# Patient Record
Sex: FEMALE | Race: WHITE | NOT HISPANIC OR LATINO | Employment: PART TIME | URBAN - METROPOLITAN AREA
[De-identification: names, ages, dates, MRNs, and addresses within clinical notes are randomized per-mention and may not be internally consistent; named-entity substitution may affect disease eponyms.]

---

## 2017-01-11 ENCOUNTER — TELEPHONE (OUTPATIENT)
Dept: ENDOCRINOLOGY | Facility: CLINIC | Age: 62
End: 2017-01-11

## 2017-01-11 DIAGNOSIS — E66.01 MORBID OBESITY DUE TO EXCESS CALORIES (H): Primary | ICD-10-CM

## 2017-01-11 NOTE — TELEPHONE ENCOUNTER
Rahat is calling on Elizabeth's behalf for Dr. Irene.  Her insurance co has a limit of 3 on the number of topiramate she can have per day.  She is currently on 6 per day.  Could we do PA for larger quantity? [employer] [place]. [phone] her ID # [id number]    Rahat [phone] OK to leave a message.    Will route to Dr. Maria Victoria Orta's clinic nurse.

## 2017-01-12 RX ORDER — TOPIRAMATE 100 MG/1
TABLET, FILM COATED ORAL
Qty: 90 TABLET | Refills: 1 | Status: SHIPPED | OUTPATIENT
Start: 2017-01-12 | End: 2017-02-17

## 2017-01-12 NOTE — TELEPHONE ENCOUNTER
"topiramate (TOPAMAX) 50 MG tablet 120 tablet 5 11/14/2016  No      Sig: Take 2 tablets (100 mg) in the morning and 4 tablets (200 mg) in the evening     Per OV 11/14/16:  \"Increase topiramate to 100 in AM and 200 in evening.\"      Rx changed to 100 mg tabs per [facility] nurse protocol.  Patient's , Rahat Verbalized understanding and agrees.  Marivel Patiño RN, BSN   "

## 2017-01-13 ENCOUNTER — ALLIED HEALTH/NURSE VISIT (OUTPATIENT)
Dept: SURGERY | Facility: CLINIC | Age: 62
End: 2017-01-13

## 2017-01-13 DIAGNOSIS — Z48.298 AFTERCARE FOLLOWING ORGAN TRANSPLANT: ICD-10-CM

## 2017-01-13 DIAGNOSIS — Z79.899 ENCOUNTER FOR LONG-TERM CURRENT USE OF MEDICATION: ICD-10-CM

## 2017-01-13 DIAGNOSIS — Z94.0 KIDNEY REPLACED BY TRANSPLANT: ICD-10-CM

## 2017-01-13 LAB
ANION GAP SERPL CALCULATED.3IONS-SCNC: 8 MMOL/L (ref 3–14)
BUN SERPL-MCNC: 14 MG/DL (ref 7–30)
CALCIUM SERPL-MCNC: 9.2 MG/DL (ref 8.5–10.1)
CHLORIDE SERPL-SCNC: 108 MMOL/L (ref 94–109)
CO2 SERPL-SCNC: 24 MMOL/L (ref 20–32)
CREAT SERPL-MCNC: 0.94 MG/DL (ref 0.52–1.04)
CYCLOSPORINE BLD LC/MS/MS-MCNC: 93 UG/L (ref 50–400)
ERYTHROCYTE [DISTWIDTH] IN BLOOD BY AUTOMATED COUNT: 14.5 % (ref 10–15)
GFR SERPL CREATININE-BSD FRML MDRD: 61 ML/MIN/1.7M2
GLUCOSE SERPL-MCNC: 138 MG/DL (ref 70–99)
HCT VFR BLD AUTO: 41.5 % (ref 35–47)
HGB BLD-MCNC: 13.3 G/DL (ref 11.7–15.7)
MCH RBC QN AUTO: 27.3 PG (ref 26.5–33)
MCHC RBC AUTO-ENTMCNC: 32 G/DL (ref 31.5–36.5)
MCV RBC AUTO: 85 FL (ref 78–100)
PLATELET # BLD AUTO: 193 10E9/L (ref 150–450)
POTASSIUM SERPL-SCNC: 4.2 MMOL/L (ref 3.4–5.3)
RBC # BLD AUTO: 4.87 10E12/L (ref 3.8–5.2)
SODIUM SERPL-SCNC: 140 MMOL/L (ref 133–144)
TME LAST DOSE: NORMAL H
WBC # BLD AUTO: 3.9 10E9/L (ref 4–11)

## 2017-01-13 NOTE — PATIENT INSTRUCTIONS
Goals:    1. Eat lean protein at each meal (3 times/day) along with a non-starchy vegetable or whole fruit, up to 1/2 c carb at a meal.   -Have your 20 g protein bar for breakfast.     -Try having 2-3 slices of turkey meat for lunch in a tommy lettuce leaf.  2. Limit peanut butter to 1 Tbsp (during day), using hard boiled eggs, string cheese, fish, softer/slow-cooked meats/poultry, and light Greek yogurt as protein sources instead.  3. If needing a snack, have vegetables.  4. Restart exercise routine (at least 1 time/week walking on the treadmill 15 min + additional 30 min walking).

## 2017-01-13 NOTE — MR AVS SNAPSHOT
MRN:[id number]                      After Visit Summary   1/13/2017    Elizabeth Palma    MRN: [id number]           Visit Information        Provider Department      1/13/2017 9:00 AM Francesca Danielle RD Surgical Weight Management        Your next 10 appointments already scheduled     Jan 20, 2017  2:00 PM   Return Visit with Javier Tuttle DPM   [facility] ([facility])    [address] [place] [id number] [phone]            Feb 17, 2017  9:15 AM   (Arrive by 9:00 AM)   Return Visit with Prakash Irene MD   Medical Weight Management ([facility])    [address] [place] [address]  4th [facility] [id number] [phone]            Feb 24, 2017  8:30 AM   LAB with  LAB   [facility] ([facility])    [address] [place] [id number] [phone]           Patient must bring picture ID.  Patient should be prepared to give a urine specimen  Please do not eat 10-12 hours before your appointment if you are coming in fasting for labs on lipids, cholesterol, or glucose (sugar).  Pregnant women should follow their Care Team instructions. Water with medications is okay. Do not drink coffee or other fluids.   If you have concerns about taking  your medications, please ask at office or if scheduling via [employer], send a message by clicking on Secure Messaging, Message Your Care Team.            Feb 24, 2017  9:00 AM   NUTRITION VISIT with Francesca Danielle RD   Surgical Weight Management ([facility])    [address] [place] [id number] [phone]            Mar 20, 2017  4:30 PM   (Arrive by 4:00 PM)   Return Kidney Transplant with Christian Jimenez MD   [facility] Nephrology ([facility])    [address] [place] [id number] [phone]             Mar 24, 2017  9:00 AM   LAB with  LAB   [facility] ([facility])    [address] [place] [address]  1st [facility] [id number] [phone]           Patient must bring picture ID.  Patient should be prepared to give a urine specimen  Please do not eat 10-12 hours before your appointment if you are coming in fasting for labs on lipids, cholesterol, or glucose (sugar).  Pregnant women should follow their Care Team instructions. Water with medications is okay. Do not drink coffee or other fluids.   If you have concerns about taking  your medications, please ask at office or if scheduling via [employer], send a message by clicking on Secure Messaging, Message Your Care Team.            Mar 24, 2017  9:30 AM   NUTRITION VISIT with Francesca Danielle RD   Surgical Weight Management ([facility])    [address] [place] [address]  4th [facility] [id number] [phone]            Apr 18, 2017 10:15 AM   RETURN GENERAL with Yonas Underwood MD   Eye Clinic ([facility])    [facility]  [address]  [facility] [address]   [phone]            May 08, 2017 10:30 AM   (Arrive by 10:15 AM)   Return Visit with Horacio Sheridan MD   [facility] Primary Care Clinic ([facility])    [address] [place] [address]  4th [facility] [id number] [phone]            Jun 05, 2017  4:30 PM   (Arrive by 4:00 PM)   RETURN ENDOCRINE with Lizbet Byers MD   [facility] Endocrinology ([facility])    [address] [place] [address]  3rd [facility] [id number] [phone]              Care Instructions    Goals:    1. Eat lean protein at each meal (3 times/day) along with a non-starchy vegetable or whole fruit, up to 1/2 c carb at a meal.   -Have your 20 g protein bar for breakfast.     -Try having 2-3 slices of turkey meat for lunch in a tommy lettuce leaf.  2. Limit  peanut butter to 1 Tbsp (during day), using hard boiled eggs, string cheese, fish, softer/slow-cooked meats/poultry, and light Greek yogurt as protein sources instead.  3. If needing a snack, have vegetables.  4. Restart exercise routine (at least 1 time/week walking on the treadmill 15 min + additional 30 min walking).         [employer] Information     [employer] gives you secure access to your electronic health record. If you see a primary care provider, you can also send messages to your care team and make appointments. If you have questions, please call your primary care clinic.  If you do not have a primary care provider, please call [phone] and they will assist you.      [employer] is an electronic gateway that provides easy, online access to your medical records. With [employer], you can request a clinic appointment, read your test results, renew a prescription or communicate with your care team.     To access your existing account, please contact your [facility] or call [phone] for assistance.        Care EveryWhere ID     This is your Care EveryWhere ID. This could be used by other organizations to access your [place] medical records  [id number]
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
## 2017-01-20 ENCOUNTER — OFFICE VISIT (OUTPATIENT)
Dept: PODIATRY | Facility: CLINIC | Age: 62
End: 2017-01-20
Payer: COMMERCIAL

## 2017-01-20 VITALS — SYSTOLIC BLOOD PRESSURE: 95 MMHG | HEART RATE: 78 BPM | DIASTOLIC BLOOD PRESSURE: 65 MMHG | OXYGEN SATURATION: 96 %

## 2017-01-20 DIAGNOSIS — E11.49 DIABETIC NEUROPATHY WITH NEUROLOGIC COMPLICATION (H): ICD-10-CM

## 2017-01-20 DIAGNOSIS — E11.40 DIABETIC NEUROPATHY WITH NEUROLOGIC COMPLICATION (H): ICD-10-CM

## 2017-01-20 DIAGNOSIS — L60.2 ONYCHAUXIS: Primary | ICD-10-CM

## 2017-01-20 PROCEDURE — G0127 TRIM NAIL(S): HCPCS | Performed by: PODIATRIST

## 2017-01-20 PROCEDURE — 99213 OFFICE O/P EST LOW 20 MIN: CPT | Mod: 25 | Performed by: PODIATRIST

## 2017-01-20 NOTE — NURSING NOTE
"Elizabeth Palma's goals for this visit include: Diabetic foot check   She requests these members of her care team be copied on today's visit information: yes    PCP: Horacio Sheridan    Referring Provider:  No referring provider defined for this encounter.    Chief Complaint   Patient presents with     RECHECK     Diabetic foot care       Initial BP 95/65 mmHg  Pulse 78  SpO2 96% Estimated body mass index is 34.07 kg/(m^2) as calculated from the following:    Height as of 12/5/16: 1.549 m (5' 1\").    Weight as of 12/27/16: 81.738 kg (180 lb 3.2 oz).  BP completed using cuff size: large    "

## 2017-01-20 NOTE — PROGRESS NOTES
Past Medical History   Diagnosis Date     Premature menopause age 37 7/10/2012     OCP (vaginal bldg)-->HT which she stopped 2 mo later documented at 2007 visit (age 51).      Autosomal dominant polycystic kidney disease 2011      (Problem list name updated by automated process. Provider to review and confirm.)     OP (osteoporosis) T score -3.8 2009     2007 T-score -3.7      Hyperlipidemia 10/15/2011     Abnormal MRI, cervical spine 10/15/2011     2011; mild changes noted. Study done for left arm symptoms Impression:  1. Mild multilevel degenerative disc disease with no significant canal or neural stenosis seen. motion artifact on the STIR images in these are not interpretable. The remaining images were interpreted      Sensory loss 10/17/2011     Bottom of feet; uncertain if there is a neuropathy per notes.       LION (obstructive sleep apnoea) 10/15/2012     intol to cpa     Immunosuppressed status (H) 3/20/2014     Encounter for long-term (current) use of other medications 2015     Hyperparathyroidism, secondary (H) 2015     Major depressive disorder, recurrent episode, moderate (H) 11/15/2012     Generalized anxiety disorder 11/15/2012     CMC DJD(carpometacarpal degenerative joint disease), localized primary 3/5/2013     Stiffness of joint, not elsewhere classified, hand 3/5/2013     Rib fractures 2013     Pain in joint, forearm -- L unhealed Fx 2013     DM type 2 (diabetes mellitus, type 2) (H) 2013     -donor kidney transplant 3/20/2014     Tremor 10/15/2011     head     Family history of tremor 10/17/2011     Restless leg syndrome      Glaucoma      Obesity (BMI 30-39.9)      Hypertension      Patient Active Problem List   Diagnosis     Autosomal dominant polycystic kidney disease     Tremor     Hypertension     Hyperlipidemia     Abnormal MRI, cervical spine     Sensory loss     Insomnia     Family history of tremor     Premature menopause age 37     OP  (osteoporosis) T score -3.8     S/P gastric bypass     LION on CPAP     Major depressive disorder, recurrent episode, moderate (H)     Generalized anxiety disorder     CMC DJD(carpometacarpal degenerative joint disease), localized primary     Stiffness of joint, not elsewhere classified, hand     Rib fractures     Pain in joint, forearm -- L unhealed Fx     -donor kidney transplant     Immunosuppressed status (H)     Encounter for long-term current use of medication     Hyperparathyroidism, secondary (H)     Hyperparathyroidism (H)     Senile osteoporosis     Pain in joint involving ankle and foot     Nightmares associated with chronic post-traumatic stress disorder     Type 2 diabetes mellitus with peripheral neuropathy (H)     Posttraumatic stress disorder     Plantar fasciitis     Right knee pain     Left knee pain     Age-related osteoporosis without current pathological fracture     Past Surgical History   Procedure Laterality Date     C transplantation of kidney  3/2014     Vinnie en y bowel       Laparoscopy, surgical; repair incisional or ventral hernia       Cholecystectomy       Esophagoscopy, gastroscopy, duodenoscopy (egd), combined N/A 2015     Procedure: COMBINED ESOPHAGOSCOPY, GASTROSCOPY, DUODENOSCOPY (EGD);  Surgeon: Sky Davey MD;  Location:  GI     Esophagoscopy, gastroscopy, duodenoscopy (egd), combined N/A 2015     Procedure: COMBINED ESOPHAGOSCOPY, GASTROSCOPY, DUODENOSCOPY (EGD), BIOPSY SINGLE OR MULTIPLE;  Surgeon: Sky Davey MD;  Location: [facility]     Social History     Social History     Marital Status:      Spouse Name: N/A     Number of Children: N/A     Years of Education: N/A     Occupational History     Not on file.     Social History Main Topics     Smoking status: Former Smoker     Smokeless tobacco: Never Used     Alcohol Use: Not on file     Drug Use: Not on file     Sexual Activity: Not on file     Other Topics Concern     Not on  file     Social History Narrative     No family history on file.    A1C      6.5   5/9/2016   SUBJECTIVE FINDINGS:  A 61-year-old female returns to clinic for onychauxis and diabetic foot check.  She is diabetic with peripheral neuropathy.  She relates she had seen her primary physician about 2 months ago, relates she has numbness and tingling and neuropathy in her feet.  No ulcers or sores since I have seen her last.  She relates she is wearing her diabetic shoes and no new problems.      OBJECTIVE FINDINGS:  DP and PT are 2/4 bilaterally.  CFTs are less than 3 seconds bilaterally.  She has incurvated nails with some dystrophy and subungual debris bilaterally 1-5.  She has dorsally contracted digits 2-5 bilaterally.  She has decreased ankle joint dorsiflexion bilaterally.  There is no erythema, no drainage, no odor, no calor bilaterally.  There is some dystrophy and dryness of the nails as well.  Sharp/dull is intact with 5.07 Buxton-Yaneth monofilament other than in left fifth MPJ she did not feel it.  Deep tendon reflexes are intact.  She does have a bunion deformity present.       ASSESSMENT AND PLAN:  Onychauxis bilaterally.  She is diabetic with peripheral neuropathy.  Diagnosis and treatment options were discussed with the patient.  All of the nails were reduced bilaterally upon consent.  She was advised on stretching, continue the diabetic shoes and return to clinic and see me in about 3 months.

## 2017-01-20 NOTE — PATIENT INSTRUCTIONS
Thanks for coming today.  [facility]  [address] [place] [address]    To schedule future appointments in Ortho Clinic, you may call [phone].    To schedule ordered imaging by your provider:   Call Central [facility] [phone]    To schedule an injection ordered by your provider:  Call Central [facility] Injection scheduling line: [phone]  [email or web address] available online at:  [email or web address]    Please call if any further questions or concerns ([phone]).  Clinic hours 8 am to 5 pm.    Return to clinic (call) if symptoms worsen or fail to improve.  .

## 2017-02-17 ENCOUNTER — OFFICE VISIT (OUTPATIENT)
Dept: ENDOCRINOLOGY | Facility: CLINIC | Age: 62
End: 2017-02-17

## 2017-02-17 VITALS
HEIGHT: 61 IN | WEIGHT: 180.9 LBS | HEART RATE: 68 BPM | DIASTOLIC BLOOD PRESSURE: 75 MMHG | SYSTOLIC BLOOD PRESSURE: 130 MMHG | OXYGEN SATURATION: 97 % | BODY MASS INDEX: 34.15 KG/M2

## 2017-02-17 DIAGNOSIS — E66.01 MORBID OBESITY DUE TO EXCESS CALORIES (H): ICD-10-CM

## 2017-02-17 RX ORDER — TOPIRAMATE 200 MG/1
200 TABLET, FILM COATED ORAL 2 TIMES DAILY
Qty: 60 TABLET | Refills: 5 | Status: SHIPPED | OUTPATIENT
Start: 2017-02-17 | End: 2017-08-17

## 2017-02-17 ASSESSMENT — ENCOUNTER SYMPTOMS
PANIC: 1
INSOMNIA: 1
DECREASED CONCENTRATION: 1
DEPRESSION: 1
NERVOUS/ANXIOUS: 1

## 2017-02-17 ASSESSMENT — PAIN SCALES - GENERAL: PAINLEVEL: NO PAIN (0)

## 2017-02-17 NOTE — MR AVS SNAPSHOT
After Visit Summary   2/17/2017    Elizabeth Palma    MRN: [id number]           Patient Information     Date Of Birth          1955        Visit Information        Provider Department      2/17/2017 9:15 AM Prakash Irene MD [facility] Medical Weight Management        Today's Diagnoses     Morbid obesity due to excess calories (H)           Follow-ups after your visit        Follow-up notes from your care team     Return in about 3 months (around 5/17/2017).      Your next 10 appointments already scheduled     Feb 24, 2017  8:30 AM CST   LAB with [facility] ([facility])    [address] [place] [address]  [address] [facility] [id number] [phone]           Patient must bring picture ID.  Patient should be prepared to give a urine specimen  Please do not eat 10-12 hours before your appointment if you are coming in fasting for labs on lipids, cholesterol, or glucose (sugar).  Pregnant women should follow their Care Team instructions. Water with medications is okay. Do not drink coffee or other fluids.   If you have concerns about taking  your medications, please ask at office or if scheduling via [employer], send a message by clicking on Secure Messaging, Message Your Care Team.            Feb 24, 2017  9:00 AM CST   NUTRITION VISIT with Francesca Danielle RD   [facility] Surgical Weight Management ([facility])    [address] [place] [address]  [address] [facility] [id number] [phone]            Mar 20, 2017  4:30 PM CDT   (Arrive by 4:00 PM)   Return Kidney Transplant with Christian Jimenez MD   [facility] Nephrology ([facility])    [address] [place] [address]  [address] [facility] [address]   [phone]            Mar 24, 2017  9:00 AM CDT   LAB with  LAB   [facility] ([facility])    [address] [place] [address]  [address] [facility] [id number] [phone]           Patient  must bring picture ID.  Patient should be prepared to give a urine specimen  Please do not eat 10-12 hours before your appointment if you are coming in fasting for labs on lipids, cholesterol, or glucose (sugar).  Pregnant women should follow their Care Team instructions. Water with medications is okay. Do not drink coffee or other fluids.   If you have concerns about taking  your medications, please ask at office or if scheduling via [employer], send a message by clicking on Secure Messaging, Message Your Care Team.            Mar 24, 2017  9:30 AM CDT   NUTRITION VISIT with Francesca Danielle RD   [facility] Surgical Weight Management ([facility])    [address] [place] [id number] [phone]            Apr 04, 2017  1:00 PM CDT   Adult Med Follow UP with Chaparrita Hatch MD   Psychiatry Clinic ([facility])    [place] [address]  [address] [facility] [phone]            Apr 18, 2017 10:15 AM CDT   RETURN GENERAL with Yonas Underwood MD   Eye Clinic ([facility])    [facility]  [address] [place] [address]  [facility]  [facility] [id number] [phone]            Apr 21, 2017  1:30 PM CDT   Return Visit with Javier Tuttle DPM   [facility] ([facility])    [address] [place] [id number] [phone]            May 08, 2017 10:30 AM CDT   (Arrive by 10:15 AM)   Return Visit with Horacio Sheridan MD   [facility] ([facility])    [address] [place] [id number] [phone]            May 19, 2017  9:15 AM CDT   (Arrive by 9:00 AM)   Return Visit with Prakash Irene MD   [facility] Medical Weight Management ([facility])    [address] [place] [id number] [phone]              Who to contact   "   Please call your clinic at [phone] to:    Ask questions about your health    Make or cancel appointments    Discuss your medicines    Learn about your test results    Speak to your doctor   If you have compliments or concerns about an experience at your clinic, or if you wish to file a complaint, please contact [facility] Patient Relations at [phone] or email us at [email or web address]         Additional Information About Your Visit        [employer] Information     leemailt gives you secure access to your electronic health record. If you see a primary care provider, you can also send messages to your care team and make appointments. If you have questions, please call your primary care clinic.  If you do not have a primary care provider, please call [phone] and they will assist you.      [employer] is an electronic gateway that provides easy, online access to your medical records. With [employer], you can request a clinic appointment, read your test results, renew a prescription or communicate with your care team.     To access your existing account, please contact your [facility] or call [phone] for assistance.        Care EveryWhere ID     This is your Care EveryWhere ID. This could be used by other organizations to access your [place] medical records  [id number]        Your Vitals Were     Pulse Height Pulse Oximetry BMI (Body Mass Index)          68 1.549 m (5' 1\") 97% 34.18 kg/m2         Blood Pressure from Last 3 Encounters:   02/17/17 130/75   01/20/17 95/65   12/05/16 132/80    Weight from Last 3 Encounters:   02/17/17 82.1 kg (180 lb 14.4 oz)   12/05/16 84 kg (185 lb 3.2 oz)   11/14/16 85.3 kg (188 lb 1.6 oz)              Today, you had the following     No orders found for display         Today's Medication Changes          These changes are accurate as of: 2/17/17 10:53 AM.  If you have any questions, ask your nurse or " doctor.               These medicines have changed or have updated prescriptions.        Dose/Directions    econazole nitrate 1 % cream   This may have changed:    - when to take this  - reasons to take this   Used for:  Type II or unspecified type diabetes mellitus with neurological manifestations, not stated as uncontrolled, Dermatophytosis of foot        Apply topically daily   Quantity:  85 g   Refills:  3       topiramate 200 MG tablet   Commonly known as:  TOPAMAX   This may have changed:    - medication strength  - how much to take  - how to take this  - when to take this  - additional instructions   Used for:  Morbid obesity due to excess calories (H)   Changed by:  Prakash Irene MD        Dose:  200 mg   Take 1 tablet (200 mg) by mouth 2 times daily   Quantity:  60 tablet   Refills:  5            Where to get your medicines      These medications were sent to [id number] [place] [id number] [address]     Phone:  [phone]     topiramate 200 MG tablet                Primary Care Provider Office Phone # Fax #    Horacio Sheridan [phone]        [facility] [address] [place] [address] [address] [place] [address]        Thank you!     Thank you for choosing [facility] WEIGHT MANAGEMENT  for your care. Our goal is always to provide you with excellent care. Hearing back from our patients is one way we can continue to improve our services. Please take a few minutes to complete the written survey that you may receive in the mail after your visit with us. Thank you!             Your Updated Medication List - Protect others around you: Learn how to safely use, store and throw away your medicines at www.disposemymeds.org.          This list is accurate as of: 2/17/17 10:53 AM.  Always use your most recent med list.                   Brand Name Dispense Instructions for use    * acetylcysteine 600 MG Caps capsule     N-ACETYL CYSTEINE    30 capsule    Take 2 400 mg caps two times daily for total daily dose of 800 mg       * acetylcysteine 600 MG Caps capsule    N-ACETYL CYSTEINE    60 capsule    Take 1 capsule (600 mg) by mouth 2 times daily       albuterol 108 (90 BASE) MCG/ACT Inhaler    PROAIR HFA/PROVENTIL HFA/VENTOLIN HFA    1 Inhaler    Inhale 2 puffs into the lungs every 6 hours as needed for shortness of breath / dyspnea or wheezing       ARIPiprazole 2 MG tablet    ABILIFY    15 tablet    Take 0.5 tablets (1 mg) by mouth daily       aspirin 81 MG EC tablet     30 tablet    Take 1 tablet (81 mg) by mouth daily       BENADRYL 25 MG capsule   Generic drug:  diphenhydrAMINE      Take 25 mg by mouth At Bedtime.       blood glucose monitoring meter device kit    no brand specified    1 kit    1 kit 2 times daily Use as directed       * BLOOD GLUCOSE TEST STRIPS Strp     60 strip    Check twice a day.       * blood glucose monitoring test strip    no brand specified    1 Month    Use to test blood sugar 2 times daily or as directed.       cyanocobalamin 1000 MCG tablet    vitamin  B-12    30 tablet    Take 1 tablet by mouth daily.       cycloSPORINE modified 25 MG capsule    GENERIC EQUIVALENT    240 capsule    Take 4 capsules (100 mg) by mouth 2 times daily       econazole nitrate 1 % cream     85 g    Apply topically daily       furosemide 20 MG tablet    LASIX    90 tablet    Take 1 tablet (20 mg) by mouth daily       * gabapentin 300 MG capsule    NEURONTIN    90 capsule    Take 1 capsule (300 mg) by mouth At Bedtime       * gabapentin 600 MG tablet    NEURONTIN    90 tablet    Take 0.5 tablets (300 mg) by mouth daily At evening       latanoprost 0.005 % ophthalmic solution    XALATAN    2.5 mL    Place 1 drop into both eyes At Bedtime       metFORMIN 500 MG 24 hr tablet    GLUCOPHAGE-XR    90 tablet    Take 3 tablets (1,500 mg) by mouth daily (with dinner)       mycophenolate 250 MG capsule    CELLCEPT - GENERIC  EQUIVALENT    240 capsule    Take 4 capsules (1,000 mg) by mouth 2 times daily       omeprazole 40 MG capsule    priLOSEC    90 capsule    Take 1 capsule (40 mg) by mouth daily       ondansetron 4 MG ODT tab    ZOFRAN-ODT    20 tablet    Take 1 tablet (4 mg) by mouth every 6 hours as needed       prazosin 5 MG capsule    MINIPRESS    90 capsule    Take 3 capsules (15 mg) by mouth At Bedtime       simvastatin 20 MG tablet    ZOCOR    90 tablet    Take 0.5 tablets (10 mg) by mouth At Bedtime       sitagliptin 50 MG tablet    JANUVIA    90 tablet    Take 1 tablet (50 mg) by mouth daily       sulfamethoxazole-trimethoprim 400-80 MG per tablet    BACTRIM/SEPTRA    90 tablet    Take 1 tablet by mouth daily       * thin lancets    NO BRAND SPECIFIED    60 each    Check twice a day       * blood glucose monitoring lancets     1 Box    Use to test blood sugars 2 times daily or as directed.       topiramate 200 MG tablet    TOPAMAX    60 tablet    Take 1 tablet (200 mg) by mouth 2 times daily       TYLENOL 325 MG tablet   Generic drug:  acetaminophen      Take 325-650 mg by mouth as needed       vilazodone 40 MG Tabs tablet    VIIBRYD    30 tablet    Take 1 tablet (40 mg) by mouth daily       vitamin D 1000 UNITS capsule     30 capsule    3 tabs (3000 units) daily       * Notice:  This list has 8 medication(s) that are the same as other medications prescribed for you. Read the directions carefully, and ask your doctor or other care provider to review them with you.

## 2017-02-17 NOTE — LETTER
2017       RE: Elizabeth Palma  [address]     [facility] [address]     Dear Colleague,    Thank you for referring your patient, Elizabeth Palma, to the [facility] MEDICAL WEIGHT MANAGEMENT at [facility]. Please see a copy of my visit note below.        Return Medical Weight Management Note     Elizabeth Palma  MRN:  [id number]  :  1955  AYSE:  2016    Dear Dr. Sheridan,      I had the pleasure of seeing your patient Elizabeth Palma.  She is a 60 year old female who I am continuing to see for treatment of obesity related to: DM-2, Hyperlipidemia, Sleep Apnea (has CPAP and does not use), GERD and Depression.    CURRENT WEIGHT:   180 lbs 14.4 oz    Wt Readings from Last 4 Encounters:   17 82.1 kg (180 lb 14.4 oz)   16 84 kg (185 lb 3.2 oz)   16 85.3 kg (188 lb 1.6 oz)   16 85.1 kg (187 lb 11.2 oz)     Body Mass Index:  Body mass index is 34.18 kg/(m^2).  Vitals:  B/P: 119/79, P: 60    Initial consult weight was 214 on 2015.  Weight change since last seen on 2016 is down 8 pounds.   Total loss is 32 pounds.    INTERVAL HISTORY:  Topiramate 100 AM and 200 HS along with decreased gabapentin, and pain is ok. Still having problems with evening and night eating.     Diet and Activity Changes Since Last Visit Reviewed With Patient 2017   I have made the following changes to my diet since my last visit: -   With regards to my diet, I am still struggling with: night eating   For breakfast, I typically eat: -   For lunch, I typically eat: half sandwitch veggies   For supper, I typically eat: protien carb veggies   For snack(s), I typically eat: carb and lots of veggies fruit   I have made the following changes to my activity/exercise since my last visit: -   With regards to my activity/exercise, I am still struggling with: frequency     MEDICATIONS:   Current Outpatient Prescriptions   Medication     topiramate (TOPAMAX)  100 MG tablet     sulfamethoxazole-trimethoprim (BACTRIM/SEPTRA) 400-80 MG per tablet     vilazodone (VIIBRYD) 40 MG TABS tablet     prazosin (MINIPRESS) 5 MG capsule     ARIPiprazole (ABILIFY) 2 MG tablet     mycophenolate (CELLCEPT - GENERIC EQUIVALENT) 250 MG capsule     gabapentin (NEURONTIN) 600 MG tablet     gabapentin (NEURONTIN) 300 MG capsule     metFORMIN (GLUCOPHAGE-XR) 500 MG 24 hr tablet     sitagliptin (JANUVIA) 50 MG tablet     latanoprost (XALATAN) 0.005 % ophthalmic solution     cycloSPORINE modified (GENERIC EQUIVALENT) 25 MG capsule     acetylcysteine (N-ACETYL-L-CYSTEINE) 600 MG CAPS capsule     acetylcysteine (N-ACETYL-L-CYSTEINE) 600 MG CAPS capsule     ondansetron (ZOFRAN-ODT) 4 MG disintegrating tablet     simvastatin (ZOCOR) 20 MG tablet     omeprazole (PRILOSEC) 40 MG capsule     furosemide (LASIX) 20 MG tablet     albuterol (PROAIR HFA, PROVENTIL HFA, VENTOLIN HFA) 108 (90 BASE) MCG/ACT inhaler     Cholecalciferol (VITAMIN D) 1000 UNITS capsule     blood glucose monitoring (ONE TOUCH DELICA) lancets     blood glucose test strip     econazole nitrate 1 % cream     Blood Glucose Monitoring Suppl (BLOOD GLUCOSE METER) KIT     Glucose Blood (BLOOD GLUCOSE TEST STRIPS) STRP     lancets thin MISC     aspirin EC 81 MG EC tablet     acetaminophen (TYLENOL) 325 MG tablet     cyanocolbalamin (VITAMIN  B-12) 1000 MCG tablet     diphenhydrAMINE (BENADRYL) 25 MG capsule     No current facility-administered medications for this visit.        Weight Loss Medication History Reviewed With Patient 2/17/2017   Which weight loss medications are you currently taking on a regular basis?  Topamax (topiramate)   Are you having any side effects from the weight loss medication that we have prescribed you? Yes   If you are having side effects please describe: tired and confusion     ASSESSMENT:   Less progress this time. Increase topiramate to 200 morning and evening. Maintain gabapentin 300 in  evening.    FOLLOW-UP:    12 weeks.  10/15 minutes spent on counseling and education    Sincerely,    Prakash Irene MD    Again, thank you for allowing me to participate in the care of your patient.      Sincerely,    Prakash Irene MD

## 2017-02-17 NOTE — PROGRESS NOTES
Return Medical Weight Management Note     Elizabeth Palma  MRN:  [id number]  :  1955  AYSE:  2016    Dear Dr. Sheridan,      I had the pleasure of seeing your patient Elizabeth Palma.  She is a 60 year old female who I am continuing to see for treatment of obesity related to: DM-2, Hyperlipidemia, Sleep Apnea (has CPAP and does not use), GERD and Depression.    CURRENT WEIGHT:   180 lbs 14.4 oz    Wt Readings from Last 4 Encounters:   17 82.1 kg (180 lb 14.4 oz)   16 84 kg (185 lb 3.2 oz)   16 85.3 kg (188 lb 1.6 oz)   16 85.1 kg (187 lb 11.2 oz)     Body Mass Index:  Body mass index is 34.18 kg/(m^2).  Vitals:  B/P: 119/79, P: 60    Initial consult weight was 214 on 2015.  Weight change since last seen on 2016 is down 8 pounds.   Total loss is 32 pounds.    INTERVAL HISTORY:  Topiramate 100 AM and 200 HS along with decreased gabapentin, and pain is ok. Still having problems with evening and night eating.     Diet and Activity Changes Since Last Visit Reviewed With Patient 2017   I have made the following changes to my diet since my last visit: -   With regards to my diet, I am still struggling with: night eating   For breakfast, I typically eat: -   For lunch, I typically eat: half sandwitch veggies   For supper, I typically eat: protien carb veggies   For snack(s), I typically eat: carb and lots of veggies fruit   I have made the following changes to my activity/exercise since my last visit: -   With regards to my activity/exercise, I am still struggling with: frequency     MEDICATIONS:   Current Outpatient Prescriptions   Medication     topiramate (TOPAMAX) 100 MG tablet     sulfamethoxazole-trimethoprim (BACTRIM/SEPTRA) 400-80 MG per tablet     vilazodone (VIIBRYD) 40 MG TABS tablet     prazosin (MINIPRESS) 5 MG capsule     ARIPiprazole (ABILIFY) 2 MG tablet     mycophenolate (CELLCEPT - GENERIC EQUIVALENT) 250 MG capsule     gabapentin (NEURONTIN) 600 MG  tablet     gabapentin (NEURONTIN) 300 MG capsule     metFORMIN (GLUCOPHAGE-XR) 500 MG 24 hr tablet     sitagliptin (JANUVIA) 50 MG tablet     latanoprost (XALATAN) 0.005 % ophthalmic solution     cycloSPORINE modified (GENERIC EQUIVALENT) 25 MG capsule     acetylcysteine (N-ACETYL-L-CYSTEINE) 600 MG CAPS capsule     acetylcysteine (N-ACETYL-L-CYSTEINE) 600 MG CAPS capsule     ondansetron (ZOFRAN-ODT) 4 MG disintegrating tablet     simvastatin (ZOCOR) 20 MG tablet     omeprazole (PRILOSEC) 40 MG capsule     furosemide (LASIX) 20 MG tablet     albuterol (PROAIR HFA, PROVENTIL HFA, VENTOLIN HFA) 108 (90 BASE) MCG/ACT inhaler     Cholecalciferol (VITAMIN D) 1000 UNITS capsule     blood glucose monitoring (ONE TOUCH DELICA) lancets     blood glucose test strip     econazole nitrate 1 % cream     Blood Glucose Monitoring Suppl (BLOOD GLUCOSE METER) KIT     Glucose Blood (BLOOD GLUCOSE TEST STRIPS) STRP     lancets thin MISC     aspirin EC 81 MG EC tablet     acetaminophen (TYLENOL) 325 MG tablet     cyanocolbalamin (VITAMIN  B-12) 1000 MCG tablet     diphenhydrAMINE (BENADRYL) 25 MG capsule     No current facility-administered medications for this visit.        Weight Loss Medication History Reviewed With Patient 2/17/2017   Which weight loss medications are you currently taking on a regular basis?  Topamax (topiramate)   Are you having any side effects from the weight loss medication that we have prescribed you? Yes   If you are having side effects please describe: tired and confusion     ASSESSMENT:   Less progress this time. Increase topiramate to 200 morning and evening. Maintain gabapentin 300 in evening.    FOLLOW-UP:    12 weeks.  10/15 minutes spent on counseling and education    Sincerely,    Prakash Irene MD

## 2017-02-17 NOTE — LETTER
2017      RE: Elizabeth Palma  [address]     [facility] [address]           Return Medical Weight Management Note     Elizabeth Palma  MRN:  [id number]  :  1955  AYSE:  2016    Dear Dr. Sheridan,      I had the pleasure of seeing your patient Elizabeth Palma.  She is a 60 year old female who I am continuing to see for treatment of obesity related to: DM-2, Hyperlipidemia, Sleep Apnea (has CPAP and does not use), GERD and Depression.    CURRENT WEIGHT:   180 lbs 14.4 oz    Wt Readings from Last 4 Encounters:   17 82.1 kg (180 lb 14.4 oz)   16 84 kg (185 lb 3.2 oz)   16 85.3 kg (188 lb 1.6 oz)   16 85.1 kg (187 lb 11.2 oz)     Body Mass Index:  Body mass index is 34.18 kg/(m^2).  Vitals:  B/P: 119/79, P: 60    Initial consult weight was 214 on 2015.  Weight change since last seen on 2016 is down 8 pounds.   Total loss is 32 pounds.    INTERVAL HISTORY:  Topiramate 100 AM and 200 HS along with decreased gabapentin, and pain is ok. Still having problems with evening and night eating.     Diet and Activity Changes Since Last Visit Reviewed With Patient 2017   I have made the following changes to my diet since my last visit: -   With regards to my diet, I am still struggling with: night eating   For breakfast, I typically eat: -   For lunch, I typically eat: half sandwitch veggies   For supper, I typically eat: protien carb veggies   For snack(s), I typically eat: carb and lots of veggies fruit   I have made the following changes to my activity/exercise since my last visit: -   With regards to my activity/exercise, I am still struggling with: frequency     MEDICATIONS:   Current Outpatient Prescriptions   Medication     topiramate (TOPAMAX) 100 MG tablet     sulfamethoxazole-trimethoprim (BACTRIM/SEPTRA) 400-80 MG per tablet     vilazodone (VIIBRYD) 40 MG TABS tablet     prazosin (MINIPRESS) 5 MG capsule     ARIPiprazole (ABILIFY) 2 MG tablet      mycophenolate (CELLCEPT - GENERIC EQUIVALENT) 250 MG capsule     gabapentin (NEURONTIN) 600 MG tablet     gabapentin (NEURONTIN) 300 MG capsule     metFORMIN (GLUCOPHAGE-XR) 500 MG 24 hr tablet     sitagliptin (JANUVIA) 50 MG tablet     latanoprost (XALATAN) 0.005 % ophthalmic solution     cycloSPORINE modified (GENERIC EQUIVALENT) 25 MG capsule     acetylcysteine (N-ACETYL-L-CYSTEINE) 600 MG CAPS capsule     acetylcysteine (N-ACETYL-L-CYSTEINE) 600 MG CAPS capsule     ondansetron (ZOFRAN-ODT) 4 MG disintegrating tablet     simvastatin (ZOCOR) 20 MG tablet     omeprazole (PRILOSEC) 40 MG capsule     furosemide (LASIX) 20 MG tablet     albuterol (PROAIR HFA, PROVENTIL HFA, VENTOLIN HFA) 108 (90 BASE) MCG/ACT inhaler     Cholecalciferol (VITAMIN D) 1000 UNITS capsule     blood glucose monitoring (ONE TOUCH DELICA) lancets     blood glucose test strip     econazole nitrate 1 % cream     Blood Glucose Monitoring Suppl (BLOOD GLUCOSE METER) KIT     Glucose Blood (BLOOD GLUCOSE TEST STRIPS) STRP     lancets thin MISC     aspirin EC 81 MG EC tablet     acetaminophen (TYLENOL) 325 MG tablet     cyanocolbalamin (VITAMIN  B-12) 1000 MCG tablet     diphenhydrAMINE (BENADRYL) 25 MG capsule     No current facility-administered medications for this visit.        Weight Loss Medication History Reviewed With Patient 2/17/2017   Which weight loss medications are you currently taking on a regular basis?  Topamax (topiramate)   Are you having any side effects from the weight loss medication that we have prescribed you? Yes   If you are having side effects please describe: tired and confusion     ASSESSMENT:   Less progress this time. Increase topiramate to 200 morning and evening. Maintain gabapentin 300 in evening.    FOLLOW-UP:    12 weeks.  10/15 minutes spent on counseling and education    Sincerely,    Prakash Irene MD

## 2017-02-17 NOTE — NURSING NOTE
"Chief Complaint   Patient presents with     Weight Problem     RMWM       Vitals:    02/17/17 0918   BP: 130/75   BP Location: Left arm   Pulse: 68   SpO2: 97%   Weight: 180 lb 14.4 oz   Height: 5' 1\"       Body mass index is 34.18 kg/(m^2).  Sally Smiley CMA                          "

## 2017-02-24 DIAGNOSIS — Z79.899 ENCOUNTER FOR LONG-TERM CURRENT USE OF MEDICATION: ICD-10-CM

## 2017-02-24 DIAGNOSIS — Z48.298 AFTERCARE FOLLOWING ORGAN TRANSPLANT: ICD-10-CM

## 2017-02-24 DIAGNOSIS — Z94.0 KIDNEY REPLACED BY TRANSPLANT: ICD-10-CM

## 2017-02-24 LAB
ANION GAP SERPL CALCULATED.3IONS-SCNC: 8 MMOL/L (ref 3–14)
BUN SERPL-MCNC: 15 MG/DL (ref 7–30)
CALCIUM SERPL-MCNC: 8.6 MG/DL (ref 8.5–10.1)
CHLORIDE SERPL-SCNC: 111 MMOL/L (ref 94–109)
CO2 SERPL-SCNC: 24 MMOL/L (ref 20–32)
CREAT SERPL-MCNC: 1 MG/DL (ref 0.52–1.04)
CYCLOSPORINE BLD LC/MS/MS-MCNC: 76 UG/L (ref 50–400)
ERYTHROCYTE [DISTWIDTH] IN BLOOD BY AUTOMATED COUNT: 14.6 % (ref 10–15)
GFR SERPL CREATININE-BSD FRML MDRD: 57 ML/MIN/1.7M2
GLUCOSE SERPL-MCNC: 132 MG/DL (ref 70–99)
HCT VFR BLD AUTO: 41.6 % (ref 35–47)
HGB BLD-MCNC: 13.2 G/DL (ref 11.7–15.7)
MCH RBC QN AUTO: 27.6 PG (ref 26.5–33)
MCHC RBC AUTO-ENTMCNC: 31.7 G/DL (ref 31.5–36.5)
MCV RBC AUTO: 87 FL (ref 78–100)
PLATELET # BLD AUTO: 174 10E9/L (ref 150–450)
POTASSIUM SERPL-SCNC: 3.9 MMOL/L (ref 3.4–5.3)
RBC # BLD AUTO: 4.79 10E12/L (ref 3.8–5.2)
SODIUM SERPL-SCNC: 143 MMOL/L (ref 133–144)
TME LAST DOSE: NORMAL H
WBC # BLD AUTO: 4.2 10E9/L (ref 4–11)

## 2017-03-19 DIAGNOSIS — H40.9 MILD STAGE GLAUCOMA(365.71): ICD-10-CM

## 2017-03-20 ENCOUNTER — OFFICE VISIT (OUTPATIENT)
Dept: NEPHROLOGY | Facility: CLINIC | Age: 62
End: 2017-03-20
Attending: INTERNAL MEDICINE
Payer: MEDICARE

## 2017-03-20 VITALS
WEIGHT: 178.2 LBS | DIASTOLIC BLOOD PRESSURE: 80 MMHG | OXYGEN SATURATION: 100 % | HEART RATE: 58 BPM | HEIGHT: 61 IN | TEMPERATURE: 98.5 F | BODY MASS INDEX: 33.64 KG/M2 | SYSTOLIC BLOOD PRESSURE: 117 MMHG

## 2017-03-20 DIAGNOSIS — Q61.2 AUTOSOMAL DOMINANT POLYCYSTIC KIDNEY DISEASE: ICD-10-CM

## 2017-03-20 DIAGNOSIS — I15.1 HYPERTENSION SECONDARY TO OTHER RENAL DISORDERS: ICD-10-CM

## 2017-03-20 DIAGNOSIS — D84.9 IMMUNOSUPPRESSED STATUS (H): ICD-10-CM

## 2017-03-20 DIAGNOSIS — Z94.0 DECEASED-DONOR KIDNEY TRANSPLANT: ICD-10-CM

## 2017-03-20 DIAGNOSIS — E11.42 TYPE 2 DIABETES MELLITUS WITH PERIPHERAL NEUROPATHY (H): Primary | ICD-10-CM

## 2017-03-20 PROCEDURE — 99212 OFFICE O/P EST SF 10 MIN: CPT | Mod: ZF

## 2017-03-20 RX ORDER — LATANOPROST 50 UG/ML
1 SOLUTION/ DROPS OPHTHALMIC AT BEDTIME
Qty: 2.5 ML | Refills: 0 | Status: SHIPPED | OUTPATIENT
Start: 2017-03-20 | End: 2017-04-18

## 2017-03-20 ASSESSMENT — PAIN SCALES - GENERAL: PAINLEVEL: NO PAIN (0)

## 2017-03-20 NOTE — LETTER
3/20/2017      RE: Elizabeth Palma  [address]   [facility] [address]       ASSESSMENT AND PLAN:   1.  Status post kidney transplant with stable graft function.  We will continue to monitor.   2.  Immunosuppression.  Continue CellCept and cyclosporine with cyclosporine levels at target.   3.  Post-transplant diabetes, controlled overall on metformin and Januvia and follows with Endocrinology. I discussed with patient to consider Metformin replacement and to discuss this with her PCP  4.  Hypertension.  Blood pressure well controlled, takes minipress for PTSD.   5.  Obesity.  Actively loosing weight   6.  General health maintenance.  Follows with Dermatology for skin cancer screening.     Reason for Visit:   Post transplant follow up.  HPI:   Elizabeth Palma is here for a followup visit.  She is a 60-year-old female who has a history of end-stage kidney disease secondary to polycystic kidney disease, status post  donor kidney transplant on 2014 with excellent graft function with creatinine 0.8-1 mg/dL.  She has had 1 kidney biopsy which was negative for rejection and overall unremarkable .     Since she was seen last, she has been doing well. No specific complaints or concerns. Taking her medications regularly. No NVD    Transplant Hx:   Tx: DDKT Date: 3/20/14   Present Maintenance IS: Tacrolimus and Mycophenolate mofetil   Baseline Creatinine: 1 mg/dL  Recent DSA: No   Biopsy: yes on 14   Assessment and plan was discussed with patient and she voiced her understanding and agreement.    ROS:    A comprehensive review of systems was obtained and negative, except as noted in the HPI or PMH.    Active Medical Problems:  Patient Active Problem List   Diagnosis     Autosomal dominant polycystic kidney disease     Tremor     Hypertension     Hyperlipidemia     Abnormal MRI, cervical spine     Sensory loss     Insomnia     Family history of tremor     Premature menopause age 37     OP  (osteoporosis) T score -3.8     S/P gastric bypass     LION on CPAP     Major depressive disorder, recurrent episode, moderate (H)     Generalized anxiety disorder     CMC DJD(carpometacarpal degenerative joint disease), localized primary     Stiffness of joint, not elsewhere classified, hand     Rib fractures     Pain in joint, forearm -- L unhealed Fx     -donor kidney transplant     Immunosuppressed status (H)     Encounter for long-term current use of medication     Hyperparathyroidism, secondary (H)     Hyperparathyroidism (H)     Senile osteoporosis     Pain in joint involving ankle and foot     Nightmares associated with chronic post-traumatic stress disorder     Type 2 diabetes mellitus with peripheral neuropathy (H)     Posttraumatic stress disorder     Plantar fasciitis     Right knee pain     Left knee pain     Age-related osteoporosis without current pathological fracture       Personal Hx:  Social History     Social History     Marital status:      Spouse name: N/A     Number of children: N/A     Years of education: N/A     Occupational History     Not on file.     Social History Main Topics     Smoking status: Former Smoker     Smokeless tobacco: Never Used     Alcohol use Not on file     Drug use: Not on file     Sexual activity: Not on file     Other Topics Concern     Not on file     Social History Narrative       Allergies:  Allergies   Allergen Reactions     Percocet [Oxycodone-Acetaminophen] Nausea and Vomiting     Novocain [Procaine Hcl] Hives     Had reaction 25 years ago to old renetta. Pt reports multiple injections of lidocaine since then without reaction.  Tolerated lidocaine injection today without difficulty.  Osmar Mark MD IR Service.       Medications:  Current Outpatient Prescriptions   Medication     latanoprost (XALATAN) 0.005 % ophthalmic solution     topiramate (TOPAMAX) 200 MG tablet     sulfamethoxazole-trimethoprim (BACTRIM/SEPTRA) 400-80 MG per tablet      "vilazodone (VIIBRYD) 40 MG TABS tablet     prazosin (MINIPRESS) 5 MG capsule     ARIPiprazole (ABILIFY) 2 MG tablet     mycophenolate (CELLCEPT - GENERIC EQUIVALENT) 250 MG capsule     gabapentin (NEURONTIN) 600 MG tablet     gabapentin (NEURONTIN) 300 MG capsule     metFORMIN (GLUCOPHAGE-XR) 500 MG 24 hr tablet     sitagliptin (JANUVIA) 50 MG tablet     cycloSPORINE modified (GENERIC EQUIVALENT) 25 MG capsule     acetylcysteine (N-ACETYL-L-CYSTEINE) 600 MG CAPS capsule     acetylcysteine (N-ACETYL-L-CYSTEINE) 600 MG CAPS capsule     ondansetron (ZOFRAN-ODT) 4 MG disintegrating tablet     simvastatin (ZOCOR) 20 MG tablet     omeprazole (PRILOSEC) 40 MG capsule     furosemide (LASIX) 20 MG tablet     albuterol (PROAIR HFA, PROVENTIL HFA, VENTOLIN HFA) 108 (90 BASE) MCG/ACT inhaler     Cholecalciferol (VITAMIN D) 1000 UNITS capsule     blood glucose monitoring (ONE TOUCH DELICA) lancets     blood glucose test strip     econazole nitrate 1 % cream     Blood Glucose Monitoring Suppl (BLOOD GLUCOSE METER) KIT     Glucose Blood (BLOOD GLUCOSE TEST STRIPS) STRP     lancets thin MISC     aspirin EC 81 MG EC tablet     acetaminophen (TYLENOL) 325 MG tablet     cyanocolbalamin (VITAMIN  B-12) 1000 MCG tablet     diphenhydrAMINE (BENADRYL) 25 MG capsule     No current facility-administered medications for this visit.      Vitals:  /80  Pulse 58  Temp 98.5  F (36.9  C) (Oral)  Ht 1.549 m (5' 1\")  Wt 80.8 kg (178 lb 3.2 oz)  SpO2 100%  BMI 33.67 kg/m2    Exam:   GENERAL APPEARANCE: alert and no distress  HENT: mouth without ulcers or lesions  LYMPHATICS: no cervical or supraclavicular nodes  RESP: lungs clear to auscultation - no rales, rhonchi or wheezes  CV: regular rhythm, normal rate, no rub, no murmur  EDEMA: no LE edema bilaterally  ABDOMEN: soft, nondistended, nontender,     SKIN: no rash        Christian Jimenez MD      "

## 2017-03-20 NOTE — PROGRESS NOTES
ASSESSMENT AND PLAN:   1.  Status post kidney transplant with stable graft function.  We will continue to monitor.   2.  Immunosuppression.  Continue CellCept and cyclosporine with cyclosporine levels at target.   3.  Post-transplant diabetes, controlled overall on metformin and Januvia and follows with Endocrinology. I discussed with patient to consider Metformin replacement and to discuss this with her PCP  4.  Hypertension.  Blood pressure well controlled, takes minipress for PTSD.   5.  Obesity.  Actively loosing weight   6.  General health maintenance.  Follows with Dermatology for skin cancer screening.     Reason for Visit:   Post transplant follow up.  HPI:   Elizabeth Palma is here for a followup visit.  She is a 60-year-old female who has a history of end-stage kidney disease secondary to polycystic kidney disease, status post  donor kidney transplant on 2014 with excellent graft function with creatinine 0.8-1 mg/dL.  She has had 1 kidney biopsy which was negative for rejection and overall unremarkable .     Since she was seen last, she has been doing well. No specific complaints or concerns. Taking her medications regularly. No NVD    Transplant Hx:   Tx: DDKT Date: 3/20/14   Present Maintenance IS: Tacrolimus and Mycophenolate mofetil   Baseline Creatinine: 1 mg/dL  Recent DSA: No   Biopsy: yes on 14   Assessment and plan was discussed with patient and she voiced her understanding and agreement.    ROS:    A comprehensive review of systems was obtained and negative, except as noted in the HPI or PMH.    Active Medical Problems:  Patient Active Problem List   Diagnosis     Autosomal dominant polycystic kidney disease     Tremor     Hypertension     Hyperlipidemia     Abnormal MRI, cervical spine     Sensory loss     Insomnia     Family history of tremor     Premature menopause age 37     OP (osteoporosis) T score -3.8     S/P gastric bypass     LION on CPAP     Major depressive disorder,  recurrent episode, moderate (H)     Generalized anxiety disorder     CMC DJD(carpometacarpal degenerative joint disease), localized primary     Stiffness of joint, not elsewhere classified, hand     Rib fractures     Pain in joint, forearm -- L unhealed Fx     -donor kidney transplant     Immunosuppressed status (H)     Encounter for long-term current use of medication     Hyperparathyroidism, secondary (H)     Hyperparathyroidism (H)     Senile osteoporosis     Pain in joint involving ankle and foot     Nightmares associated with chronic post-traumatic stress disorder     Type 2 diabetes mellitus with peripheral neuropathy (H)     Posttraumatic stress disorder     Plantar fasciitis     Right knee pain     Left knee pain     Age-related osteoporosis without current pathological fracture       Personal Hx:  Social History     Social History     Marital status:      Spouse name: N/A     Number of children: N/A     Years of education: N/A     Occupational History     Not on file.     Social History Main Topics     Smoking status: Former Smoker     Smokeless tobacco: Never Used     Alcohol use Not on file     Drug use: Not on file     Sexual activity: Not on file     Other Topics Concern     Not on file     Social History Narrative       Allergies:  Allergies   Allergen Reactions     Percocet [Oxycodone-Acetaminophen] Nausea and Vomiting     Novocain [Procaine Hcl] Hives     Had reaction 25 years ago to old renetta. Pt reports multiple injections of lidocaine since then without reaction.  Tolerated lidocaine injection today without difficulty.  Osmar Mark MD IR Service.       Medications:  Current Outpatient Prescriptions   Medication     latanoprost (XALATAN) 0.005 % ophthalmic solution     topiramate (TOPAMAX) 200 MG tablet     sulfamethoxazole-trimethoprim (BACTRIM/SEPTRA) 400-80 MG per tablet     vilazodone (VIIBRYD) 40 MG TABS tablet     prazosin (MINIPRESS) 5 MG capsule     ARIPiprazole (ABILIFY)  "2 MG tablet     mycophenolate (CELLCEPT - GENERIC EQUIVALENT) 250 MG capsule     gabapentin (NEURONTIN) 600 MG tablet     gabapentin (NEURONTIN) 300 MG capsule     metFORMIN (GLUCOPHAGE-XR) 500 MG 24 hr tablet     sitagliptin (JANUVIA) 50 MG tablet     cycloSPORINE modified (GENERIC EQUIVALENT) 25 MG capsule     acetylcysteine (N-ACETYL-L-CYSTEINE) 600 MG CAPS capsule     acetylcysteine (N-ACETYL-L-CYSTEINE) 600 MG CAPS capsule     ondansetron (ZOFRAN-ODT) 4 MG disintegrating tablet     simvastatin (ZOCOR) 20 MG tablet     omeprazole (PRILOSEC) 40 MG capsule     furosemide (LASIX) 20 MG tablet     albuterol (PROAIR HFA, PROVENTIL HFA, VENTOLIN HFA) 108 (90 BASE) MCG/ACT inhaler     Cholecalciferol (VITAMIN D) 1000 UNITS capsule     blood glucose monitoring (ONE TOUCH DELICA) lancets     blood glucose test strip     econazole nitrate 1 % cream     Blood Glucose Monitoring Suppl (BLOOD GLUCOSE METER) KIT     Glucose Blood (BLOOD GLUCOSE TEST STRIPS) STRP     lancets thin MISC     aspirin EC 81 MG EC tablet     acetaminophen (TYLENOL) 325 MG tablet     cyanocolbalamin (VITAMIN  B-12) 1000 MCG tablet     diphenhydrAMINE (BENADRYL) 25 MG capsule     No current facility-administered medications for this visit.      Vitals:  /80  Pulse 58  Temp 98.5  F (36.9  C) (Oral)  Ht 1.549 m (5' 1\")  Wt 80.8 kg (178 lb 3.2 oz)  SpO2 100%  BMI 33.67 kg/m2    Exam:   GENERAL APPEARANCE: alert and no distress  HENT: mouth without ulcers or lesions  LYMPHATICS: no cervical or supraclavicular nodes  RESP: lungs clear to auscultation - no rales, rhonchi or wheezes  CV: regular rhythm, normal rate, no rub, no murmur  EDEMA: no LE edema bilaterally  ABDOMEN: soft, nondistended, nontender,     SKIN: no rash      "

## 2017-03-20 NOTE — TELEPHONE ENCOUNTER
latanoprost (XALATAN) 0.005 % ophthalmic solution      Last Written Prescription Date:  10-3-16  Last Fill Quantity: 2.5 ml,   # refills: 6  Last Office Visit: 4-12-16  Future Office visit:   4-18-17  Last Note:  Progress Notes      Assessment & Plan   Assessment & Plan        Elizabeth Palma is a 60 year old female with the following diagnoses:   1. Pseudophakia - Both Eyes    2. Glaucoma suspect of both eyes    3. Type 2 diabetes mellitus without retinopathy (HCC)    4. Dry eye, bilateral          Maximum intraocular pressure 21/18  Family history: negative  Trauma history: negative  Pachymetry : 532/527     Using latanoprost at bedtime both eyes with good compliance  Intraocular pressures acceptable today   Visual field and OCT stable to improved both eyes   Exam stable      Doing well with dryness. Right lower lid plug in place, left lower lid punctum appears closed, unable to pass sizer through punctum. Discussed lubrication.      Recheck visual field in 1 year               Kathleen M Doege RN

## 2017-03-20 NOTE — NURSING NOTE
"Chief Complaint   Patient presents with     RECHECK     Follow up kidney transplant       Initial /80  Pulse 58  Temp 98.5  F (36.9  C) (Oral)  Ht 1.549 m (5' 1\")  Wt 80.8 kg (178 lb 3.2 oz)  SpO2 100%  BMI 33.67 kg/m2 Estimated body mass index is 33.67 kg/(m^2) as calculated from the following:    Height as of this encounter: 1.549 m (5' 1\").    Weight as of this encounter: 80.8 kg (178 lb 3.2 oz).  Medication Reconciliation: incomplete   Patient declined medication review.  Kari Lyon. CMA    "

## 2017-03-20 NOTE — MR AVS SNAPSHOT
After Visit Summary   3/20/2017    Elizabeth Palma    MRN: [id number]           Patient Information     Date Of Birth          1955        Visit Information        Provider Department      3/20/2017 4:30 PM Chirstian Jimenez MD [facility] Nephrology         Follow-ups after your visit        Your next 10 appointments already scheduled     Mar 24, 2017  9:00 AM CDT   LAB with  LAB    Health Lab ([facility])    [address]  1st Floor  [facility] [id number] [phone]           Patient must bring picture ID.  Patient should be prepared to give a urine specimen  Please do not eat 10-12 hours before your appointment if you are coming in fasting for labs on lipids, cholesterol, or glucose (sugar).  Pregnant women should follow their Care Team instructions. Water with medications is okay. Do not drink coffee or other fluids.   If you have concerns about taking  your medications, please ask at office or if scheduling via [email or web address], send a message by clicking on Secure Messaging, Message Your Care Team.            Mar 24, 2017  9:30 AM CDT   NUTRITION VISIT with Francesca Danielle RD   [facility] ([facility])    [address] [place] [address]  4th Floor  [facility] [id number] [phone]            Apr 04, 2017  1:00 PM CDT   Adult Med Follow UP with Chaparrita Hatch MD   Psychiatry Clinic ([facility])    [address]  [address] [facility] [address]   [phone]            Apr 18, 2017 10:15 AM CDT   RETURN GENERAL with Yonas Underwood MD   Eye Clinic ([facility])    [address] [facility]  [address] [facility] [address]  [address] [address]  [facility] [address]   [phone]            Apr 21, 2017  9:00 AM CDT   NUTRITION VISIT with Francesca Danielle RD   [facility] ([facility])    [address] [place] [address]  [address] [address]   [phone]            Apr 21, 2017  1:30 PM CDT   Return Visit with Javier Tuttle DPM   [facility] ([facility])    [address] [place] [address] [phone]            May 15, 2017  2:30 PM CDT   (Arrive by 2:15 PM)   RETURN ENDOCRINE with Lizbet Byers MD   [facility] ([facility])    [address] [place] [address] [phone]            May 19, 2017  9:30 AM CDT   NUTRITION VISIT with Francesca Danielle RD   [facility] ([facility])    [address] [place] [address]   [phone]            May 22, 2017  8:30 AM CDT   (Arrive by 8:15 AM)   Return Visit with Horacio Sheridan MD   [facility] ([facility])    [address] [place] [address] [phone]            May 26, 2017  9:15 AM CDT   (Arrive by 9:00 AM)   Return Visit with Prakash Irene MD   [facility] ([facility])    [address] [place] [id number] [phone]              Who to contact     If you have questions or need follow up information about today's clinic visit or your schedule please contact [facility] NEPHROLOG[facility] directly at [phone].  Normal or non-critical lab and imaging results will be communicated to you by MyChart, letter or phone within 4 business days after the clinic has received the results. If you do not hear from us within 7 days, please contact the clinic through MyChart or phone. If you have a critical or abnormal lab result, we will notify you by phone as soon as possible.  Submit refill requests through [employer] or call your pharmacy and they will forward the refill request to us. Please allow 3 business days for  "your refill to be completed.          Additional Information About Your Visit        MyChart Information     [facility] gives you secure access to your electronic health record. If you see a primary care provider, you can also send messages to your care team and make appointments. If you have questions, please call your primary care clinic.  If you do not have a primary care provider, please call [phone] and they will assist you.        Care EveryWhere ID     This is your Care EveryWhere ID. This could be used by other organizations to access your [facility] medical records  [id number]        Your Vitals Were     Pulse Temperature Height Pulse Oximetry BMI (Body Mass Index)       58 98.5  F (36.9  C) (Oral) 1.549 m (5' 1\") 100% 33.67 kg/m2        Blood Pressure from Last 3 Encounters:   03/20/17 117/80   02/17/17 130/75   01/20/17 95/65    Weight from Last 3 Encounters:   03/20/17 80.8 kg (178 lb 3.2 oz)   02/17/17 82.1 kg (180 lb 14.4 oz)   12/05/16 84 kg (185 lb 3.2 oz)              Today, you had the following     No orders found for display         Today's Medication Changes          These changes are accurate as of: 3/20/17  5:15 PM.  If you have any questions, ask your nurse or doctor.               These medicines have changed or have updated prescriptions.        Dose/Directions    econazole nitrate 1 % cream   This may have changed:    - when to take this  - reasons to take this   Used for:  Type II or unspecified type diabetes mellitus with neurological manifestations, not stated as uncontrolled, Dermatophytosis of foot        Apply topically daily   Quantity:  85 g   Refills:  3                Primary Care Provider Office Phone # Fax #    Horacio Sheridan [phone]        [facility] [address]  [facility] [address]        Thank you!     Thank you for choosing [facility] NEPHROLOGY  for your care. Our goal is always to provide you with excellent care. Hearing back from our " patients is one way we can continue to improve our services. Please take a few minutes to complete the written survey that you may receive in the mail after your visit with us. Thank you!             Your Updated Medication List - Protect others around you: Learn how to safely use, store and throw away your medicines at www.disposemymeds.org.          This list is accurate as of: 3/20/17  5:15 PM.  Always use your most recent med list.                   Brand Name Dispense Instructions for use    * acetylcysteine 600 MG Caps capsule    N-ACETYL CYSTEINE    30 capsule    Take 2 400 mg caps two times daily for total daily dose of 800 mg       * acetylcysteine 600 MG Caps capsule    N-ACETYL CYSTEINE    60 capsule    Take 1 capsule (600 mg) by mouth 2 times daily       albuterol 108 (90 BASE) MCG/ACT Inhaler    PROAIR HFA/PROVENTIL HFA/VENTOLIN HFA    1 Inhaler    Inhale 2 puffs into the lungs every 6 hours as needed for shortness of breath / dyspnea or wheezing       ARIPiprazole 2 MG tablet    ABILIFY    15 tablet    Take 0.5 tablets (1 mg) by mouth daily       aspirin 81 MG EC tablet     30 tablet    Take 1 tablet (81 mg) by mouth daily       BENADRYL 25 MG capsule   Generic drug:  diphenhydrAMINE      Take 25 mg by mouth At Bedtime.       blood glucose monitoring meter device kit    no brand specified    1 kit    1 kit 2 times daily Use as directed       * BLOOD GLUCOSE TEST STRIPS Strp     60 strip    Check twice a day.       * blood glucose monitoring test strip    no brand specified    1 Month    Use to test blood sugar 2 times daily or as directed.       cyanocobalamin 1000 MCG tablet    vitamin  B-12    30 tablet    Take 1 tablet by mouth daily.       cycloSPORINE modified 25 MG capsule    GENERIC EQUIVALENT    240 capsule    Take 4 capsules (100 mg) by mouth 2 times daily       econazole nitrate 1 % cream     85 g    Apply topically daily       furosemide 20 MG tablet    LASIX    90 tablet    Take 1 tablet  (20 mg) by mouth daily       * gabapentin 300 MG capsule    NEURONTIN    90 capsule    Take 1 capsule (300 mg) by mouth At Bedtime       * gabapentin 600 MG tablet    NEURONTIN    90 tablet    Take 0.5 tablets (300 mg) by mouth daily At evening       latanoprost 0.005 % ophthalmic solution    XALATAN    2.5 mL    Place 1 drop into both eyes At Bedtime       metFORMIN 500 MG 24 hr tablet    GLUCOPHAGE-XR    90 tablet    Take 3 tablets (1,500 mg) by mouth daily (with dinner)       mycophenolate 250 MG capsule    CELLCEPT - GENERIC EQUIVALENT    240 capsule    Take 4 capsules (1,000 mg) by mouth 2 times daily       omeprazole 40 MG capsule    priLOSEC    90 capsule    Take 1 capsule (40 mg) by mouth daily       ondansetron 4 MG ODT tab    ZOFRAN-ODT    20 tablet    Take 1 tablet (4 mg) by mouth every 6 hours as needed       prazosin 5 MG capsule    MINIPRESS    90 capsule    Take 3 capsules (15 mg) by mouth At Bedtime       simvastatin 20 MG tablet    ZOCOR    90 tablet    Take 0.5 tablets (10 mg) by mouth At Bedtime       sitagliptin 50 MG tablet    JANUVIA    90 tablet    Take 1 tablet (50 mg) by mouth daily       sulfamethoxazole-trimethoprim 400-80 MG per tablet    BACTRIM/SEPTRA    90 tablet    Take 1 tablet by mouth daily       * thin lancets    NO BRAND SPECIFIED    60 each    Check twice a day       * blood glucose monitoring lancets     1 Box    Use to test blood sugars 2 times daily or as directed.       topiramate 200 MG tablet    TOPAMAX    60 tablet    Take 1 tablet (200 mg) by mouth 2 times daily       TYLENOL 325 MG tablet   Generic drug:  acetaminophen      Take 325-650 mg by mouth as needed       vilazodone 40 MG Tabs tablet    VIIBRYD    30 tablet    Take 1 tablet (40 mg) by mouth daily       vitamin D 1000 UNITS capsule     30 capsule    3 tabs (3000 units) daily       * Notice:  This list has 8 medication(s) that are the same as other medications prescribed for you. Read the directions carefully,  and ask your doctor or other care provider to review them with you.

## 2017-03-23 ENCOUNTER — TELEPHONE (OUTPATIENT)
Dept: INTERNAL MEDICINE | Facility: CLINIC | Age: 62
End: 2017-03-23

## 2017-03-23 DIAGNOSIS — E78.5 HYPERLIPIDEMIA: ICD-10-CM

## 2017-03-23 DIAGNOSIS — E11.42 TYPE 2 DIABETES MELLITUS WITH PERIPHERAL NEUROPATHY (H): Primary | ICD-10-CM

## 2017-03-24 ENCOUNTER — ALLIED HEALTH/NURSE VISIT (OUTPATIENT)
Dept: RESEARCH | Facility: CLINIC | Age: 62
End: 2017-03-24
Payer: COMMERCIAL

## 2017-03-24 ENCOUNTER — ALLIED HEALTH/NURSE VISIT (OUTPATIENT)
Dept: SURGERY | Facility: CLINIC | Age: 62
End: 2017-03-24

## 2017-03-24 DIAGNOSIS — Z00.6 EXAMINATION OF PARTICIPANT IN CLINICAL TRIAL: Primary | ICD-10-CM

## 2017-03-24 DIAGNOSIS — Z94.0 KIDNEY REPLACED BY TRANSPLANT: ICD-10-CM

## 2017-03-24 DIAGNOSIS — Z79.899 ENCOUNTER FOR LONG-TERM CURRENT USE OF MEDICATION: ICD-10-CM

## 2017-03-24 DIAGNOSIS — Z48.298 AFTERCARE FOLLOWING ORGAN TRANSPLANT: ICD-10-CM

## 2017-03-24 LAB
ANION GAP SERPL CALCULATED.3IONS-SCNC: 11 MMOL/L (ref 3–14)
BUN SERPL-MCNC: 14 MG/DL (ref 7–30)
CALCIUM SERPL-MCNC: 8.8 MG/DL (ref 8.5–10.1)
CHLORIDE SERPL-SCNC: 109 MMOL/L (ref 94–109)
CO2 SERPL-SCNC: 21 MMOL/L (ref 20–32)
CREAT SERPL-MCNC: 1 MG/DL (ref 0.52–1.04)
CREAT UR-MCNC: 416 MG/DL
CYCLOSPORINE BLD LC/MS/MS-MCNC: 77 UG/L (ref 50–400)
ERYTHROCYTE [DISTWIDTH] IN BLOOD BY AUTOMATED COUNT: 14.4 % (ref 10–15)
GFR SERPL CREATININE-BSD FRML MDRD: 57 ML/MIN/1.7M2
GLUCOSE SERPL-MCNC: 143 MG/DL (ref 70–99)
HCT VFR BLD AUTO: 43.7 % (ref 35–47)
HGB BLD-MCNC: 13.8 G/DL (ref 11.7–15.7)
MCH RBC QN AUTO: 27.1 PG (ref 26.5–33)
MCHC RBC AUTO-ENTMCNC: 31.6 G/DL (ref 31.5–36.5)
MCV RBC AUTO: 86 FL (ref 78–100)
PLATELET # BLD AUTO: 175 10E9/L (ref 150–450)
POTASSIUM SERPL-SCNC: 4 MMOL/L (ref 3.4–5.3)
PROT UR-MCNC: 0.53 G/L
PROT/CREAT 24H UR: 0.13 G/G CR (ref 0–0.2)
RBC # BLD AUTO: 5.09 10E12/L (ref 3.8–5.2)
SODIUM SERPL-SCNC: 142 MMOL/L (ref 133–144)
TME LAST DOSE: NORMAL H
WBC # BLD AUTO: 3.9 10E9/L (ref 4–11)

## 2017-03-24 NOTE — PROGRESS NOTES
"Nutrition Reassessment  Reason For Visit:  Elizabeth Palma is a 61 year-old female with type 2 DM (oral med management) presenting today for nutrition follow-up, s/p \"gastric bypass in 1990 at [facility]\" per Pt report.  She is seeing medical weight management as well.  She was referred by Dr. Irene.  Note: Pt had a kidney transplant on March 20, 2014.    Anthropometrics:  Height: 61\"  Pre-op Weight: 265 lbs per Pt report; lowest weight after bariatric surgery: 165-170 lbs (25 years ago)  (Pt reported that she initially was at 215 lbs in 2015 prior to seeing writer.)    Current Weight (3/24/16): 176 lbs (-5.9 lbs over the past 2 months)    Current Vitamins/Minerals: 3000 International Units vitamin D/day, Calcium, vitamin C, vitamin B12 daily, B-complex  *Pt stated that she was told not to take other vitamins/minerals by MD.    Nutrition History:  Lactose intolerance ever since bariatric surgery.  Pt stated that she has osteoporosis; however, she stated that her doctors don't want her to take any vitamins or minerals due to her kidney transplant.    Progress with Previous Goals:  1. Eat lean protein at each meal (3 times/day) along with a non-starchy vegetable or whole fruit, up to 1/2 c carb at a meal.- Pt stated that her appetite has not been very good lately (getting sick), but she tries her hardest to get in protein (eggs, chicken).    -Have your 20 g protein bar for breakfast.     -Try having 2-3 slices of turkey meat for lunch in a tommy lettuce leaf.  2. Limit peanut butter to 1 Tbsp (during day), using hard boiled eggs, string cheese, fish, softer/slow-cooked meats/poultry, and light Greek yogurt as protein sources instead.- Meeting.   3. If needing a snack, have vegetables.- Meeting; dipping in non-fat sour cream occasionally.     4. Restart exercise routine (at least 1 time/week walking on the treadmill 15 min + additional 30 min walking).  - Pt has been walking 5-6 days/week for 15 min.     Previous " Note:    *Pt stated that she will not record food intake due to the fact that every time she does this, she simply does not eat as she doesn't want to record.  She stated that her therapist strongly advises against recording food intake for this reason.    Nutrition Prescription:  Grams Protein: 60 (minimum)  Amount of Fluid: 48-64 oz    Nutrition Diagnosis  Previous: Obesity related to excessive energy intake as evidenced by BMI > 30.- continues    Intervention  Intervention At Appointment:  Materials/Education provided:  Praised Pt for weight loss and positive changes made. Reinforced previous goals including optimizing lean protein in light of her h/o a RNY GB.  Encouraged her to restart fun exercise (No class) to keep her interested and motivated.  Gave encouragement and support.  Provided Pt with list of goals.    Patient Understanding: good  Expected Compliance: good    Goals:    1. Eat lean protein at each meal (3 times/day) along with a non-starchy vegetable or whole fruit, up to 1/2 c carb at a meal.   -Have your 20 g protein bar for breakfast.     -Try having 2-3 slices of turkey meat for lunch in a tommy lettuce leaf.  2. Limit peanut butter to 1 Tbsp (during day), using hard boiled eggs, string cheese, fish, softer/slow-cooked meats/poultry, and light Greek yogurt as protein sources instead.  3. If needing a snack, have vegetables.  4. Restart exercise routine (at least 1 time/week walking on the treadmill 15 min + additional 30 min walking).  (Try a class again.)    Follow-Up: 1 month    Time spent with patient: 15 minutes.  Francesca Danielle, RD, LD, CLT  Pager: [phone]

## 2017-03-24 NOTE — MR AVS SNAPSHOT
After Visit Summary   3/24/2017    Elizabeth Palma    MRN: [id number]           Patient Information     Date Of Birth          1955        Visit Information        Provider Department      3/24/2017 7:25 PM Specialty, Provider [facility] [place],  Clinical Research        Today's Diagnoses     Examination of participant in clinical trial    -  1       Follow-ups after your visit        Your next 10 appointments already scheduled     Apr 04, 2017  1:00 PM CDT   Adult Med Follow UP with Chaparrita Hatch MD   Psychiatry Clinic ([facility])    [place] [id number]  [address] [facility] [address]   [phone]            Apr 14, 2017  8:30 AM CDT   (Arrive by 8:15 AM)   PHYSICAL with Horacio Sheridan MD   [facility] ([facility] [place])    [address] [place] [id number] [phone]            Apr 14, 2017  9:00 AM CDT   Nurse Visit with  Pcc Nurse   [facility] ([facility] [place])    [address] [place] [address] [phone]            Apr 18, 2017 10:15 AM CDT   RETURN GENERAL with Yonas Underwood MD   Eye Clinic ([facility])    [address] Clin [address]  [facility] [address]   [phone]            Apr 21, 2017  9:00 AM CDT   NUTRITION VISIT with Francesca Danielle RD   [facility] Surgical Weight Management ([facility] [place])    [address] [place] [address]   [phone]            Apr 21, 2017  1:30 PM CDT   Return Visit with Javier Tuttle DPM   [facility] ([facility])    [address] [place] [id number] [phone]            May 15, 2017  2:30 PM CDT   (Arrive by 2:15 PM)   RETURN ENDOCRINE with Lizbet Byers MD   [facility] ([facility] [place])    [address] [place] [address]   [phone]            May 19, 2017  9:30 AM CDT   NUTRITION VISIT with Francesca Danielle RD   [facility] Surgical Weight Management ([facility])    [address] [place] [address]   [phone]            May 26, 2017  9:15 AM CDT   (Arrive by 9:00 AM)   Return Visit with Prakash Irene MD   [facility] Medical Weight Management ([facility])    [address] [place] [address]   [phone]            Jun 16, 2017  9:00 AM CDT   NUTRITION VISIT with SUJATA Stapleton [facility] Surgical Weight Management ([facility])    [address] [place] [address]   [phone]              Who to contact     If you have questions or need follow up information about today's clinic visit or your schedule please contact [facility]  [facility] directly at [phone].  Normal or non-critical lab and imaging results will be communicated to you by [employer], letter or phone within 4 business days after the clinic has received the results. If you do not hear from us within 7 days, please contact the clinic through [employer] or phone. If you have a critical or abnormal lab result, we will notify you by phone as soon as possible.  Submit refill requests through [employer] or call your pharmacy and they will forward the refill request to us. Please allow 3 business days for your refill to be completed.          Additional Information About Your Visit        [employer] Information     [employer] gives you secure access to your electronic health record. If you see a primary care provider, you can also send messages to your care team and make appointments. If you have questions, please call your primary care clinic.  If you do not have a primary care provider, please call  [phone] and they will assist you.        Care EveryWhere ID     This is your Care EveryWhere ID. This could be used by other organizations to access your [place] medical records  [id number]         Blood Pressure from Last 3 Encounters:   03/20/17 117/80   02/17/17 130/75   01/20/17 95/65    Weight from Last 3 Encounters:   03/20/17 80.8 kg (178 lb 3.2 oz)   02/17/17 82.1 kg (180 lb 14.4 oz)   12/05/16 84 kg (185 lb 3.2 oz)              Today, you had the following     No orders found for display         Today's Medication Changes          These changes are accurate as of: 3/24/17  7:27 PM.  If you have any questions, ask your nurse or doctor.               These medicines have changed or have updated prescriptions.        Dose/Directions    econazole nitrate 1 % cream   This may have changed:    - when to take this  - reasons to take this   Used for:  Type II or unspecified type diabetes mellitus with neurological manifestations, not stated as uncontrolled, Dermatophytosis of foot        Apply topically daily   Quantity:  85 g   Refills:  3                Primary Care Provider Office Phone # Fax #    Horacio Sheridan [phone]        [facility] [address] [facility] [address]  [facility] [address]        Thank you!     Thank you for choosing [facility],  [facility]  for your care. Our goal is always to provide you with excellent care. Hearing back from our patients is one way we can continue to improve our services. Please take a few minutes to complete the written survey that you may receive in the mail after your visit with us. Thank you!             Your Updated Medication List - Protect others around you: Learn how to safely use, store and throw away your medicines at www.disposemymeds.org.          This list is accurate as of: 3/24/17  7:27 PM.  Always use your most recent med list.                   Brand Name Dispense Instructions for use    * acetylcysteine 600 MG Caps capsule     N-ACETYL CYSTEINE    30 capsule    Take 2 400 mg caps two times daily for total daily dose of 800 mg       * acetylcysteine 600 MG Caps capsule    N-ACETYL CYSTEINE    60 capsule    Take 1 capsule (600 mg) by mouth 2 times daily       albuterol 108 (90 BASE) MCG/ACT Inhaler    PROAIR HFA/PROVENTIL HFA/VENTOLIN HFA    1 Inhaler    Inhale 2 puffs into the lungs every 6 hours as needed for shortness of breath / dyspnea or wheezing       ARIPiprazole 2 MG tablet    ABILIFY    15 tablet    Take 0.5 tablets (1 mg) by mouth daily       aspirin 81 MG EC tablet     30 tablet    Take 1 tablet (81 mg) by mouth daily       BENADRYL 25 MG capsule   Generic drug:  diphenhydrAMINE      Take 25 mg by mouth At Bedtime.       blood glucose monitoring meter device kit    no brand specified    1 kit    1 kit 2 times daily Use as directed       * BLOOD GLUCOSE TEST STRIPS Strp     60 strip    Check twice a day.       * blood glucose monitoring test strip    no brand specified    1 Month    Use to test blood sugar 2 times daily or as directed.       cyanocobalamin 1000 MCG tablet    vitamin  B-12    30 tablet    Take 1 tablet by mouth daily.       cycloSPORINE modified 25 MG capsule    GENERIC EQUIVALENT    240 capsule    Take 4 capsules (100 mg) by mouth 2 times daily       econazole nitrate 1 % cream     85 g    Apply topically daily       furosemide 20 MG tablet    LASIX    90 tablet    Take 1 tablet (20 mg) by mouth daily       * gabapentin 300 MG capsule    NEURONTIN    90 capsule    Take 1 capsule (300 mg) by mouth At Bedtime       * gabapentin 600 MG tablet    NEURONTIN    90 tablet    Take 0.5 tablets (300 mg) by mouth daily At evening       latanoprost 0.005 % ophthalmic solution    XALATAN    2.5 mL    Place 1 drop into both eyes At Bedtime       metFORMIN 500 MG 24 hr tablet    GLUCOPHAGE-XR    90 tablet    Take 3 tablets (1,500 mg) by mouth daily (with dinner)       mycophenolate 250 MG capsule    CELLCEPT - GENERIC  EQUIVALENT    240 capsule    Take 4 capsules (1,000 mg) by mouth 2 times daily       omeprazole 40 MG capsule    priLOSEC    90 capsule    Take 1 capsule (40 mg) by mouth daily       ondansetron 4 MG ODT tab    ZOFRAN-ODT    20 tablet    Take 1 tablet (4 mg) by mouth every 6 hours as needed       prazosin 5 MG capsule    MINIPRESS    90 capsule    Take 3 capsules (15 mg) by mouth At Bedtime       simvastatin 20 MG tablet    ZOCOR    90 tablet    Take 0.5 tablets (10 mg) by mouth At Bedtime       sitagliptin 50 MG tablet    JANUVIA    90 tablet    Take 1 tablet (50 mg) by mouth daily       sulfamethoxazole-trimethoprim 400-80 MG per tablet    BACTRIM/SEPTRA    90 tablet    Take 1 tablet by mouth daily       * thin lancets    NO BRAND SPECIFIED    60 each    Check twice a day       * blood glucose monitoring lancets     1 Box    Use to test blood sugars 2 times daily or as directed.       topiramate 200 MG tablet    TOPAMAX    60 tablet    Take 1 tablet (200 mg) by mouth 2 times daily       TYLENOL 325 MG tablet   Generic drug:  acetaminophen      Take 325-650 mg by mouth as needed       vilazodone 40 MG Tabs tablet    VIIBRYD    30 tablet    Take 1 tablet (40 mg) by mouth daily       vitamin D 1000 UNITS capsule     30 capsule    3 tabs (3000 units) daily       * Notice:  This list has 8 medication(s) that are the same as other medications prescribed for you. Read the directions carefully, and ask your doctor or other care provider to review them with you.

## 2017-03-24 NOTE — MR AVS SNAPSHOT
MRN:[id number]                      After Visit Summary   3/24/2017    Elizabeth Palma    MRN: [id number]           Visit Information        Provider Department      3/24/2017 9:30 AM Francesca Danielle RD [facility]        Your next 10 appointments already scheduled     Apr 04, 2017  1:00 PM CDT   Adult Med Follow UP with Chaparrita Hatch MD   Psychiatry Clinic ([facility])    [place] [address]  [address] [facility] [address]   [phone]            Apr 14, 2017  8:30 AM CDT   (Arrive by 8:15 AM)   PHYSICAL with Horacio Sheridan MD   [facility] ([facility])    [address] [place] [address]  [address] [facility] [id number] [phone]            Apr 14, 2017  9:00 AM CDT   Nurse Visit with  Pcc Nurse   [facility] ([facility])    [address] [place] [address]  [address] [facility] [address] [phone]            Apr 18, 2017 10:15 AM CDT   RETURN GENERAL with Yonas Underwood MD   Eye Clinic ([facility])    [address]  [address] [facility]  [address] Clin 9a  [facility] [address]   [phone]            Apr 21, 2017  9:00 AM CDT   NUTRITION VISIT with Francesca Danielle RD   [facility] ([facility])    [address] [place] [address]  [address] [facility] [address]   [phone]            Apr 21, 2017  1:30 PM CDT   Return Visit with Javier Tuttle DPM   [facility] ([facility])    [address] [place] [id number] [phone]            May 15, 2017  2:30 PM CDT   (Arrive by 2:15 PM)   RETURN ENDOCRINE with Lizbet Byers MD   [facility] ([facility])    [address] [place] [address]  [address] [facility] [id number] [phone]            May 19, 2017  9:30  AM CDT   NUTRITION VISIT with Francesca Danielle RD   [facility] ([facility] [place])    [address] [address]   [phone]            May 26, 2017  9:15 AM CDT   (Arrive by 9:00 AM)   Return Visit with Prakash Irene MD   [facility] Medical Weight Management ([facility] [place])    [address] [address]   [phone]            Jun 16, 2017  9:00 AM CDT   NUTRITION VISIT with Francesca Danielle RD   [facility] ([facility] [place])    [address] [address]   [phone]              Care Instructions    Goals:    1. Eat lean protein at each meal (3 times/day) along with a non-starchy vegetable or whole fruit, up to 1/2 c carb at a meal.   -Have your 20 g protein bar for breakfast.     -Try having 2-3 slices of turkey meat for lunch in a tommy lettuce leaf.  2. Limit peanut butter to 1 Tbsp (during day), using hard boiled eggs, string cheese, fish, softer/slow-cooked meats/poultry, and light Greek yogurt as protein sources instead.  3. If needing a snack, have vegetables.  4. Restart exercise routine (at least 1 time/week walking on the treadmill 15 min + additional 30 min walking).      Follow-up: 1 month       [employer] Information     [employer] gives you secure access to your electronic health record. If you see a primary care provider, you can also send messages to your care team and make appointments. If you have questions, please call your primary care clinic.  If you do not have a primary care provider, please call [phone] and they will assist you.      [employer] is an electronic gateway that provides easy, online access to your medical records. With [employer], you can request a clinic appointment, read your test results, renew a prescription or communicate with your care team.      To access your existing account, please contact your [facility] or call [phone] for assistance.        Care EveryWhere ID     This is your Care EveryWhere ID. This could be used by other organizations to access your [place] medical records  [id number]

## 2017-03-24 NOTE — PATIENT INSTRUCTIONS
Goals:    1. Eat lean protein at each meal (3 times/day) along with a non-starchy vegetable or whole fruit, up to 1/2 c carb at a meal.   -Have your 20 g protein bar for breakfast.     -Try having 2-3 slices of turkey meat for lunch in a tommy lettuce leaf.  2. Limit peanut butter to 1 Tbsp (during day), using hard boiled eggs, string cheese, fish, softer/slow-cooked meats/poultry, and light Greek yogurt as protein sources instead.  3. If needing a snack, have vegetables.  4. Restart exercise routine (at least 1 time/week walking on the treadmill 15 min + additional 30 min walking).      Follow-up: 1 month

## 2017-03-25 NOTE — PROGRESS NOTES
Transplant Research Organization Informed Consent Process Documentation  Study Name: Female Urinary Microbiome, Chronic Graft Dysfunction and Kidney Transplantation (IRB # [id number])  ICF Version Date / IRB Approval Date:  Version dt 1/23/17; IRB Approval dt: 2/3/17   Date of Approach/Consent: 3/20/2017    The subject was screened and meets all of the inclusion criteria and none of the exclusion criteria is met.    The subject was told:  -that the study involves research  -the purpose of the research study  -the expected duration of the study and the approximate number of subject sought  -of procedures that are identified as experimental  -of reasonably foreseeable risks or discomforts to the subject  -of any benefits to the subject or others that may be expected from the research  -of alternative procedures and/or treatment  -how the confidentiality of records would be maintained  -whether or not compensation and medical treatments are available should injury occur as a result of the study  -who to contact if they have questions related to the research study or questions regarding research subjects' rights  -that participation is completely voluntary and that their decision to or not to participate will have no impact on their relationships with the [facility] and the staff    No study procedures were completed prior to the consent being obtained.  The use of historical information (lab or assessments) used for the purpose of the study was approved by subject.  The subject was fully aware that we would be reviewing their medical record for the study.  The subject demonstrated an understanding of what the study involved.  Specifically, how this study differed from standard of care at our center and what was required of the subject as part of the study.  The subject reviewed the consent form and was given the opportunity to ask questions before signing.  Questions and concerns were answered by the study staff and/or study  physician.    The subject was offered a copy of the signed informed consent but declined.  The consent require the use of a :   [] Yes [x]  No     A 'short form' consent was used:     [] Yes [x] No         If yes, provide name of witness: N/A  The subject required a legally-authorized representative (LAR) to sign on their behalf:                                                    [] Yes  [x] No   If yes, please record name of LAR: N/A    Questions to Evaluate Subject Comprehension of Study  Adult or Legally Authorized Representative (LAR) for a Dependent:  Question: Adequate Response? If No, explain what actions were taken   What is the purpose of this study? [x] Yes  [] No    What will you be asked to do to participate in this study?  [x] Yes  [] No    How many study visits will there be? [x] Yes  [] No

## 2017-04-04 ENCOUNTER — TELEPHONE (OUTPATIENT)
Dept: PSYCHIATRY | Facility: CLINIC | Age: 62
End: 2017-04-04

## 2017-04-04 ENCOUNTER — OFFICE VISIT (OUTPATIENT)
Dept: PSYCHIATRY | Facility: CLINIC | Age: 62
End: 2017-04-04
Attending: PSYCHIATRY & NEUROLOGY
Payer: MEDICARE

## 2017-04-04 VITALS
BODY MASS INDEX: 33.48 KG/M2 | WEIGHT: 177.2 LBS | DIASTOLIC BLOOD PRESSURE: 85 MMHG | SYSTOLIC BLOOD PRESSURE: 122 MMHG | HEART RATE: 76 BPM

## 2017-04-04 DIAGNOSIS — F43.10 POSTTRAUMATIC STRESS DISORDER: Primary | ICD-10-CM

## 2017-04-04 DIAGNOSIS — F43.12 NIGHTMARES ASSOCIATED WITH CHRONIC POST-TRAUMATIC STRESS DISORDER: ICD-10-CM

## 2017-04-04 DIAGNOSIS — F33.1 MAJOR DEPRESSIVE DISORDER, RECURRENT EPISODE, MODERATE (H): ICD-10-CM

## 2017-04-04 DIAGNOSIS — F41.1 GENERALIZED ANXIETY DISORDER: ICD-10-CM

## 2017-04-04 DIAGNOSIS — F51.5 NIGHTMARES ASSOCIATED WITH CHRONIC POST-TRAUMATIC STRESS DISORDER: ICD-10-CM

## 2017-04-04 PROCEDURE — 99212 OFFICE O/P EST SF 10 MIN: CPT | Mod: ZF

## 2017-04-04 RX ORDER — PRAZOSIN HYDROCHLORIDE 5 MG/1
15 CAPSULE ORAL AT BEDTIME
Qty: 90 CAPSULE | Refills: 3 | Status: SHIPPED | OUTPATIENT
Start: 2017-04-04 | End: 2017-05-02

## 2017-04-04 RX ORDER — ARIPIPRAZOLE 2 MG/1
1 TABLET ORAL DAILY
Qty: 15 TABLET | Refills: 3 | Status: SHIPPED | OUTPATIENT
Start: 2017-04-04 | End: 2017-05-02

## 2017-04-04 RX ORDER — VILAZODONE HYDROCHLORIDE 40 MG/1
40 TABLET ORAL DAILY
Qty: 30 TABLET | Refills: 3 | Status: SHIPPED | OUTPATIENT
Start: 2017-04-04 | End: 2017-05-02
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
## 2017-04-04 NOTE — MR AVS SNAPSHOT
After Visit Summary   4/4/2017    Elizabeth Palma    MRN: [id number]           Patient Information     Date Of Birth          1955        Visit Information        Provider Department      4/4/2017 1:00 PM Chaparrita Hatch MD [facility]        Today's Diagnoses     Posttraumatic stress disorder    -  1    Major depressive disorder, recurrent episode, moderate (H)        Nightmares associated with chronic post-traumatic stress disorder        Generalized anxiety disorder           Follow-ups after your visit        Follow-up notes from your care team     Return in about 4 weeks (around 5/2/2017) for 30 min. MFU.      Your next 10 appointments already scheduled     Apr 13, 2017 10:30 AM CDT   LAB with  LAB   [facility] ([facility])    [address] [place] [id number] [phone]           Patient must bring picture ID.  Patient should be prepared to give a urine specimen  Please do not eat 10-12 hours before your appointment if you are coming in fasting for labs on lipids, cholesterol, or glucose (sugar).  Pregnant women should follow their Care Team instructions. Water with medications is okay. Do not drink coffee or other fluids.   If you have concerns about taking  your medications, please ask at office or if scheduling via [employer], send a message by clicking on Secure Messaging, Message Your Care Team.            Apr 14, 2017  8:30 AM CDT   (Arrive by 8:15 AM)   PHYSICAL with Horacio Sheridan MD   [facility] ([facility])    [address] [place] [id number] [phone]            Apr 14, 2017  9:00 AM CDT   Nurse Visit with  Pcc Nurse   [facility] ([facility])    [address] [place] [address]   [phone]            Apr 18, 2017 10:15 AM CDT   RETURN GENERAL with Yonas Bonner  MD Yasmine   [facility] ([facility])    [address]  [address]  [address]  [facility] [address]   [phone]            Apr 21, 2017  9:00 AM CDT   NUTRITION VISIT with Francesca Danielle RD   [facility] Surgical Weight Management ([facility])    [address] [facility]  [address] [facility] [id number] [phone]            Apr 21, 2017  1:30 PM CDT   Return Visit with Javier Tuttle DPM   [facility] ([facility])    [address] [place] [address]   [phone]            May 02, 2017 10:45 AM CDT   (Arrive by 10:30 AM)   Transplant Skin Check with Lonny Mcduffie MD   [facility] Dermatology ([facility])    [address] [facility]  [address] [facility] [id number] [phone]            May 15, 2017  2:30 PM CDT   (Arrive by 2:15 PM)   RETURN ENDOCRINE with Lizbet Byers MD   [facility] Endocrinology ([facility])    [address] [facility]  [address] [facility] [id number] [phone]            May 19, 2017  9:30 AM CDT   NUTRITION VISIT with Francesca Danielle RD   [facility] ([facility])    [address] [facility]  [address] [facility] [id number] [phone]            May 26, 2017  9:15 AM CDT   (Arrive by 9:00 AM)   Return Visit with Prakash Irene MD   [facility] Medical Weight Management ([facility])    [address] [facility]  [address] [facility] [id number] [phone]              Who to contact     Please call your clinic at [phone] to:    Ask questions about your health    Make or cancel appointments    Discuss your medicines    Learn about your test results    Speak to your doctor   If you have compliments or concerns about an experience at your clinic, or if you wish to file a complaint, please contact  [facility] Patient Relations at [phone] or email us at [email or web address]         Additional Information About Your Visit        [employer] Information     [employer] gives you secure access to your electronic health record. If you see a primary care provider, you can also send messages to your care team and make appointments. If you have questions, please call your primary care clinic.  If you do not have a primary care provider, please call [phone] and they will assist you.      [employer] is an electronic gateway that provides easy, online access to your medical records. With [employer], you can request a clinic appointment, read your test results, renew a prescription or communicate with your care team.     To access your existing account, please contact your [facility] or call [phone] for assistance.        Care EveryWhere ID     This is your Care EveryWhere ID. This could be used by other organizations to access your [place] medical records  [id number]        Your Vitals Were     Pulse BMI (Body Mass Index)                76 33.48 kg/m2           Blood Pressure from Last 3 Encounters:   04/04/17 122/85   03/20/17 117/80   02/17/17 130/75    Weight from Last 3 Encounters:   04/04/17 80.4 kg (177 lb 3.2 oz)   03/20/17 80.8 kg (178 lb 3.2 oz)   02/17/17 82.1 kg (180 lb 14.4 oz)              Today, you had the following     No orders found for display         Today's Medication Changes          These changes are accurate as of: 4/4/17  1:45 PM.  If you have any questions, ask your nurse or doctor.               These medicines have changed or have updated prescriptions.        Dose/Directions    econazole nitrate 1 % cream   This may have changed:    - when to take this  - reasons to take this   Used for:  Type II or unspecified type diabetes mellitus with neurological manifestations, not stated as uncontrolled, Dermatophytosis of foot         Apply topically daily   Quantity:  85 g   Refills:  3            Where to get your medicines      These medications were sent to [id number] - [place], [place] - [address] [address]     Phone:  [phone]     ARIPiprazole 2 MG tablet         These medications were sent to [id number] - [place] - [address] [facility] [address]  [address], [place] [address]    Hours:  24-hours Phone:  [phone]     prazosin 5 MG capsule    vilazodone 40 MG Tabs tablet                Primary Care Provider Office Phone # Fax #    Horacio Sheridan [phone]        [facility] [address] [place] [address] [address]  [facility] [address]        Thank you!     Thank you for choosing [facility]  for your care. Our goal is always to provide you with excellent care. Hearing back from our patients is one way we can continue to improve our services. Please take a few minutes to complete the written survey that you may receive in the mail after your visit with us. Thank you!             Your Updated Medication List - Protect others around you: Learn how to safely use, store and throw away your medicines at www.disposemymeds.org.          This list is accurate as of: 4/4/17  1:45 PM.  Always use your most recent med list.                   Brand Name Dispense Instructions for use    * acetylcysteine 600 MG Caps capsule    N-ACETYL CYSTEINE    30 capsule    Take 2 400 mg caps two times daily for total daily dose of 800 mg       * acetylcysteine 600 MG Caps capsule    N-ACETYL CYSTEINE    60 capsule    Take 1 capsule (600 mg) by mouth 2 times daily       albuterol 108 (90 BASE) MCG/ACT Inhaler    PROAIR HFA/PROVENTIL HFA/VENTOLIN HFA    1 Inhaler    Inhale 2 puffs into the lungs every 6 hours as needed for shortness of breath / dyspnea or wheezing       ARIPiprazole 2 MG tablet    ABILIFY    15 tablet    Take 0.5 tablets (1 mg) by  mouth daily       aspirin 81 MG EC tablet     30 tablet    Take 1 tablet (81 mg) by mouth daily       BENADRYL 25 MG capsule   Generic drug:  diphenhydrAMINE      Take 25 mg by mouth At Bedtime.       blood glucose monitoring meter device kit    no brand specified    1 kit    1 kit 2 times daily Use as directed       * BLOOD GLUCOSE TEST STRIPS Strp     60 strip    Check twice a day.       * blood glucose monitoring test strip    no brand specified    1 Month    Use to test blood sugar 2 times daily or as directed.       cyanocobalamin 1000 MCG tablet    vitamin  B-12    30 tablet    Take 1 tablet by mouth daily.       cycloSPORINE modified 25 MG capsule    GENERIC EQUIVALENT    240 capsule    Take 4 capsules (100 mg) by mouth 2 times daily       econazole nitrate 1 % cream     85 g    Apply topically daily       furosemide 20 MG tablet    LASIX    90 tablet    Take 1 tablet (20 mg) by mouth daily       * gabapentin 300 MG capsule    NEURONTIN    90 capsule    Take 1 capsule (300 mg) by mouth At Bedtime       * gabapentin 600 MG tablet    NEURONTIN    90 tablet    Take 0.5 tablets (300 mg) by mouth daily At evening       latanoprost 0.005 % ophthalmic solution    XALATAN    2.5 mL    Place 1 drop into both eyes At Bedtime       metFORMIN 500 MG 24 hr tablet    GLUCOPHAGE-XR    90 tablet    Take 3 tablets (1,500 mg) by mouth daily (with dinner)       mycophenolate 250 MG capsule    CELLCEPT - GENERIC EQUIVALENT    240 capsule    Take 4 capsules (1,000 mg) by mouth 2 times daily       omeprazole 40 MG capsule    priLOSEC    90 capsule    Take 1 capsule (40 mg) by mouth daily       ondansetron 4 MG ODT tab    ZOFRAN-ODT    20 tablet    Take 1 tablet (4 mg) by mouth every 6 hours as needed       prazosin 5 MG capsule    MINIPRESS    90 capsule    Take 3 capsules (15 mg) by mouth At Bedtime       simvastatin 20 MG tablet    ZOCOR    90 tablet    Take 0.5 tablets (10 mg) by mouth At Bedtime       sitagliptin 50 MG tablet     JANUVIA    90 tablet    Take 1 tablet (50 mg) by mouth daily       sulfamethoxazole-trimethoprim 400-80 MG per tablet    BACTRIM/SEPTRA    90 tablet    Take 1 tablet by mouth daily       * thin lancets    NO BRAND SPECIFIED    60 each    Check twice a day       * blood glucose monitoring lancets     1 Box    Use to test blood sugars 2 times daily or as directed.       topiramate 200 MG tablet    TOPAMAX    60 tablet    Take 1 tablet (200 mg) by mouth 2 times daily       TYLENOL 325 MG tablet   Generic drug:  acetaminophen      Take 325-650 mg by mouth as needed       vilazodone 40 MG Tabs tablet    VIIBRYD    30 tablet    Take 1 tablet (40 mg) by mouth daily       vitamin D 1000 UNITS capsule     30 capsule    3 tabs (3000 units) daily       * Notice:  This list has 8 medication(s) that are the same as other medications prescribed for you. Read the directions carefully, and ask your doctor or other care provider to review them with you.

## 2017-04-04 NOTE — NURSING NOTE
Chief Complaint   Patient presents with     RECHECK     Major depressive disorder, recurrent episode, moderate      Reviewed allergies, smoking status, and pharmacy preference  Administered abuse screening questions   Obtained weight, blood pressure and heart rate   Hermelinda Ballard LPN

## 2017-04-05 ASSESSMENT — PATIENT HEALTH QUESTIONNAIRE - PHQ9: SUM OF ALL RESPONSES TO PHQ QUESTIONS 1-9: 13

## 2017-04-13 DIAGNOSIS — E11.42 TYPE 2 DIABETES MELLITUS WITH PERIPHERAL NEUROPATHY (H): ICD-10-CM

## 2017-04-13 DIAGNOSIS — E78.5 HYPERLIPIDEMIA: ICD-10-CM

## 2017-04-13 LAB
CHOLEST SERPL-MCNC: 195 MG/DL
HBA1C MFR BLD: 6.2 % (ref 4.3–6)
HDLC SERPL-MCNC: 54 MG/DL
LDLC SERPL CALC-MCNC: 108 MG/DL
NONHDLC SERPL-MCNC: 141 MG/DL
TRIGL SERPL-MCNC: 165 MG/DL

## 2017-04-14 ENCOUNTER — OFFICE VISIT (OUTPATIENT)
Dept: INTERNAL MEDICINE | Facility: CLINIC | Age: 62
End: 2017-04-14

## 2017-04-14 VITALS
DIASTOLIC BLOOD PRESSURE: 75 MMHG | WEIGHT: 175 LBS | SYSTOLIC BLOOD PRESSURE: 108 MMHG | RESPIRATION RATE: 16 BRPM | HEART RATE: 85 BPM | BODY MASS INDEX: 33.07 KG/M2

## 2017-04-14 DIAGNOSIS — E11.42 TYPE 2 DIABETES MELLITUS WITH PERIPHERAL NEUROPATHY (H): Primary | ICD-10-CM

## 2017-04-14 DIAGNOSIS — Z12.4 SCREENING FOR MALIGNANT NEOPLASM OF CERVIX: ICD-10-CM

## 2017-04-14 DIAGNOSIS — Z12.11 SCREEN FOR COLON CANCER: ICD-10-CM

## 2017-04-14 RX ORDER — LANCETS
EACH MISCELLANEOUS
Qty: 1 BOX | Refills: 11 | Status: SHIPPED | OUTPATIENT
Start: 2017-04-14 | End: 2022-03-09

## 2017-04-14 ASSESSMENT — PAIN SCALES - GENERAL: PAINLEVEL: MILD PAIN (3)

## 2017-04-14 NOTE — PROGRESS NOTES
ASSESSMENT/PLAN:  Diabetes - I worry that her low A1c of 6.2% is low because she is very likely low a lot.  She has lost a lot of weight.  She may not need to be on so many meds.  She can stop the Januvia.  She should keep the metformin since that will not cause hypoglycemia and the others that she could be on would.  She said that she will drop very low by symptoms and take glucose pills.  She said that she will use the glucometer but won't carry it with her when she goes out.    - Stop Januvia    - Con't Metformin for now to avoid risk of hypoglycemia with other meds    - Glucometer - asked to check fasting and 2 hours after a meal    Colon cancer - FIT test  Cervical cancer - refer to [facility] for pap    Total time spent 30 minutes.  More than 50% of the time spent with Ms. Palma on counseling / coordinating her care        Horacio Sheridan MD, FACP      Chief complaint:  Elizabeth Palma is a 61 year old female presents for   Chief Complaint   Patient presents with     Diabetes     patient states she is here to discuss dm medications     Ear Problem     patient state she is having problems with left ear fullness        SUBJECTIVE:  She had her left ear cleaned out by the assistant before I say him.    Her nephrologist would like her to be off of metformin and she would like to be off of the januvia.  Apparently the nephrologist told her that the metformin will cause her kidney function to get worse (?).    She can tell when her glucose is low by symptoms but never checks them so does not know her numbers.  She never checks her glucoses and does not even have a meter.    Medications and allergies were reviewed by me today.       Patient Active Problem List    Diagnosis Date Noted     Type 2 diabetes mellitus with peripheral neuropathy (H) 2015     Priority: High     -donor kidney transplant 2014     Priority: High     Major depressive disorder, recurrent episode, moderate (H) 11/15/2012      Priority: High     Autosomal dominant polycystic kidney disease 07/05/2011     Priority: High     (Problem list name updated by automated process. Provider to review and confirm.)       OP (osteoporosis) T score -3.8 09/21/2009     Priority: High     2007 T-score -3.7       Age-related osteoporosis without current pathological fracture 08/10/2016     Priority: Medium     Right knee pain 02/08/2016     Priority: Medium     Left knee pain 02/08/2016     Priority: Medium     Posttraumatic stress disorder 11/24/2015     Priority: Medium     Nightmares associated with chronic post-traumatic stress disorder 10/27/2015     Priority: Medium     Pain in joint involving ankle and foot 07/13/2015     Priority: Medium     Senile osteoporosis 05/29/2015     Priority: Medium     Hyperparathyroidism (H) 02/26/2015     Priority: Medium     Problem list name updated by automated process. Provider to review       Hyperparathyroidism, secondary (H) 02/25/2015     Priority: Medium     Immunosuppressed status (H) 03/20/2014     Priority: Medium     Pain in joint, forearm -- L unhealed Fx 05/21/2013     Priority: Medium     CMC DJD(carpometacarpal degenerative joint disease), localized primary 03/05/2013     Priority: Medium     Generalized anxiety disorder 11/15/2012     Priority: Medium     LION on CPAP 10/15/2012     Priority: Medium     Premature menopause age 37 07/10/2012     Priority: Medium     OCP (vaginal bldg)-->HT which she stopped 2 mo later documented at Jan 12, 2007 visit (age 51).       Sensory loss 10/17/2011     Priority: Medium     Bottom of feet; uncertain if there is a neuropathy per notes.        Hypertension 10/15/2011     Priority: Medium     Hyperlipidemia 10/15/2011     Priority: Medium     Abnormal MRI, cervical spine 10/15/2011     Priority: Medium     4/2011; mild changes noted. Study done for left arm symptoms  Impression:   1. Mild multilevel degenerative disc disease with no significant canal  or neural  stenosis seen. motion artifact on the STIR images in these  are not interpretable. The remaining images were interpreted         PHYSICAL EXAM:  /75  Pulse 85  Resp 16  Wt 79.4 kg (175 lb)  BMI 33.07 kg/m2

## 2017-04-14 NOTE — NURSING NOTE
Chief Complaint   Patient presents with     Diabetes     patient states she is here to discuss dm medications     Ear Problem     patient state she is having problems with left ear fullness     HARISH WILLIS at 8:41 AM on 4/14/2017.

## 2017-04-14 NOTE — PATIENT INSTRUCTIONS
[facility] Medication Refill Request Information:  * Please contact your pharmacy regarding ANY request for medication refills.  ** [facility] Prescription Fax = [phone]  * Please allow 3 business days for routine medication refills.  * Please allow 5 business days for controlled substance medication refills.     [facility] Test Result notification information:  *You will be notified with in 7-10 days of your appointment day regarding the results of your test.  If you are on MyChart you will be notified as soon as the provider has reviewed the results and signed off on them.

## 2017-04-14 NOTE — MR AVS SNAPSHOT
After Visit Summary   4/14/2017    Elizabeth Palma    MRN: [id number]           Patient Information     Date Of Birth          1955        Visit Information        Provider Department      4/14/2017 8:30 AM Horacio Sheridan MD [facility]        Today's Diagnoses     Type 2 diabetes mellitus with peripheral neuropathy (H)    -  1    Screen for colon cancer        Screening for malignant neoplasm of cervix          Care Instructions    Primary Care Center Medication Refill Request Information:  * Please contact your pharmacy regarding ANY request for medication refills.  ** [facility] Prescription Fax = [phone]  * Please allow 3 business days for routine medication refills.  * Please allow 5 business days for controlled substance medication refills.     Primary Care Center Test Result notification information:  *You will be notified with in 7-10 days of your appointment day regarding the results of your test.  If you are on MyChart you will be notified as soon as the provider has reviewed the results and signed off on them.            Follow-ups after your visit        Additional Services     OB/GYN REFERRAL       Your provider has referred you to:  [facility]    Please be aware that coverage of these services is subject to the terms and limitations of your health insurance plan.  Call member services at your health plan with any benefit or coverage questions.      Please bring the following with you to your appointment:    (1) Any X-Rays, CTs or MRIs which have been performed.  Contact the facility where they were done to arrange for  prior to your scheduled appointment.   (2) List of current medications   (3) This referral request   (4) Any documents/labs given to you for this referral                  Your next 10 appointments already scheduled     Apr 18, 2017 10:15 AM CDT   RETURN GENERAL with Yonas Underwood MD   Eye Clinic ([facility])     [address]  [address]  [address]  [facility] [address]   [phone]            Apr 21, 2017  9:00 AM CDT   NUTRITION VISIT with Francesca Danielle RD   [facility] Surgical Weight Management ([facility])    [address]  4th [facility] [address]   [phone]            Apr 21, 2017 10:00 AM CDT   DBT Individual Therapy with Era Gonzalez, PhD    Psychiatry Clinic ([facility])    [address]  [address] [facility] [address]   [phone]            Apr 21, 2017  1:30 PM CDT   Return Visit with Javier Tuttle DPM   [facility] ([facility])    [id number] [place] [address]   [phone]            May 02, 2017 10:45 AM CDT   (Arrive by 10:30 AM)   Transplant Skin Check with Lonny Mcduffie MD   [facility] Dermatology ([facility])    [address] [place] [address]  3rd [facility] [address]   [phone]            May 02, 2017  1:00 PM CDT   Adult Med Follow UP with Chaparrita Hatch MD   [facility] Psychiatry [facility]    [address] [place] [address] [address]  [facility] [address]   [phone]            May 15, 2017  2:30 PM CDT   (Arrive by 2:15 PM)   RETURN ENDOCRINE with Lizbet Byers MD   [facility] Endocrinology ([facility])    [address] [place] [address]  [address] [facility] [address]   [phone]            May 19, 2017  9:30 AM CDT   NUTRITION VISIT with Francesca Danielle RD   [facility] Surgical Weight Management ([facility])    [address] [place] [address]  [address] [facility] [address]   [phone]            May 26, 2017  9:15 AM CDT   (Arrive by 9:00 AM)   Return Visit with Prakash Irene MD   [facility] Medical Weight Management ([facility])    [address] [place] [address]  [address]  [address]th [facility] [address] [phone]            Jun 16, 2017  9:00 AM CDT   NUTRITION VISIT with Francesca Danielle RD   [facility] Surgical Weight Management ([facility])    [address]  [address]th [facility] [address] [phone]              Future tests that were ordered for you today     Open Future Orders        Priority Expected Expires Ordered    Fecal colorectal cancer screen FIT - Future (S+30) Routine 5/5/2017 5/14/2017 4/14/2017            Who to contact     Please call your clinic at [phone] to:    Ask questions about your health    Make or cancel appointments    Discuss your medicines    Learn about your test results    Speak to your doctor   If you have compliments or concerns about an experience at your clinic, or if you wish to file a complaint, please contact [facility] Patient Relations at [phone] or email us at [email or web address]         Additional Information About Your Visit        [employer] Information     [employer] gives you secure access to your electronic health record. If you see a primary care provider, you can also send messages to your care team and make appointments. If you have questions, please call your primary care clinic.  If you do not have a primary care provider, please call [phone] and they will assist you.      [employer] is an electronic gateway that provides easy, online access to your medical records. With [employer], you can request a clinic appointment, read your test results, renew a prescription or communicate with your care team.     To access your existing account, please contact your [facility] Clinic or call [phone] for assistance.        Care EveryWhere ID     This is your Care EveryWhere ID. This could be used by other organizations to access your [facility] medical records  [id number]        Your Vitals Were     Pulse  Respirations BMI (Body Mass Index)             85 16 33.07 kg/m2          Blood Pressure from Last 3 Encounters:   04/14/17 108/75   03/20/17 117/80   02/17/17 130/75    Weight from Last 3 Encounters:   04/14/17 79.4 kg (175 lb)   03/20/17 80.8 kg (178 lb 3.2 oz)   02/17/17 82.1 kg (180 lb 14.4 oz)              We Performed the Following     OB/GYN REFERRAL          Today's Medication Changes          These changes are accurate as of: 4/14/17  9:23 AM.  If you have any questions, ask your nurse or doctor.               These medicines have changed or have updated prescriptions.        Dose/Directions    acetylcysteine 600 MG Caps capsule   Commonly known as:  N-ACETYL CYSTEINE   This may have changed:  Another medication with the same name was removed. Continue taking this medication, and follow the directions you see here.   Changed by:  Chaparrita Hatch MD        Take 2 400 mg caps two times daily for total daily dose of 800 mg   Quantity:  30 capsule   Refills:  0       * blood glucose monitoring meter device kit   Commonly known as:  no brand specified   This may have changed:  Another medication with the same name was added. Make sure you understand how and when to take each.   Used for:  Type 2 diabetes, HbA1c goal < 7% (H)   Changed by:  Horacio Sheridan MD        Dose:  1 kit   1 kit 2 times daily Use as directed   Quantity:  1 kit   Refills:  0       * blood glucose monitoring meter device kit   Commonly known as:  no brand specified   This may have changed:  You were already taking a medication with the same name, and this prescription was added. Make sure you understand how and when to take each.   Used for:  Type 2 diabetes mellitus with peripheral neuropathy (H)   Changed by:  Horacio Sheridan MD        Use to test blood sugar 2 times daily or as directed.   Quantity:  1 kit   Refills:  0       * BLOOD GLUCOSE TEST STRIPS Strp   This may have changed:  Another medication with the same name was  added. Make sure you understand how and when to take each.   Used for:  Type 2 diabetes, HbA1c goal < 7% (H)   Changed by:  Horacio Sheridan MD        Check twice a day.   Quantity:  60 strip   Refills:  11       * blood glucose monitoring test strip   Commonly known as:  no brand specified   This may have changed:  Another medication with the same name was added. Make sure you understand how and when to take each.   Used for:  Type II or unspecified type diabetes mellitus without mention of complication, not stated as uncontrolled   Changed by:  Horacio Sheridan MD        Use to test blood sugar 2 times daily or as directed.   Quantity:  1 Month   Refills:  5       * blood glucose monitoring test strip   Commonly known as:  no brand specified   This may have changed:  You were already taking a medication with the same name, and this prescription was added. Make sure you understand how and when to take each.   Used for:  Type 2 diabetes mellitus with peripheral neuropathy (H)   Changed by:  Horacio Sheridan MD        Use to test blood sugars 2 times daily or as directed   Quantity:  100 strip   Refills:  11       econazole nitrate 1 % cream   This may have changed:    - when to take this  - reasons to take this   Used for:  Type II or unspecified type diabetes mellitus with neurological manifestations, not stated as uncontrolled, Dermatophytosis of foot        Apply topically daily   Quantity:  85 g   Refills:  3       * thin lancets   Commonly known as:  NO BRAND SPECIFIED   This may have changed:  Another medication with the same name was added. Make sure you understand how and when to take each.   Used for:  Type 2 diabetes, HbA1c goal < 7% (H)   Changed by:  Horacio Sheridan MD        Check twice a day   Quantity:  60 each   Refills:  11       * blood glucose monitoring lancets   This may have changed:  Another medication with the same name was added. Make sure you understand how and when to take each.   Used for:  Type  II or unspecified type diabetes mellitus without mention of complication, not stated as uncontrolled   Changed by:  Horacio Sheridan MD        Use to test blood sugars 2 times daily or as directed.   Quantity:  1 Box   Refills:  3       * blood glucose monitoring lancets   This may have changed:  You were already taking a medication with the same name, and this prescription was added. Make sure you understand how and when to take each.   Used for:  Type 2 diabetes mellitus with peripheral neuropathy (H)   Changed by:  Horacio Sheridan MD        Use to test blood sugar 2 times daily or as directed.   Quantity:  1 Box   Refills:  11       * Notice:  This list has 8 medication(s) that are the same as other medications prescribed for you. Read the directions carefully, and ask your doctor or other care provider to review them with you.         Where to get your medicines      These medications were sent to [id number] [place], [place] - [address]  [address] [place] [address]     Phone:  [phone]     blood glucose monitoring lancets    blood glucose monitoring meter device kit    blood glucose monitoring test strip                Primary Care Provider Office Phone # Fax #    Horacio Sheridan [phone]        [facility] [address] [facility] [address]  [facility] [address]        Thank you!     Thank you for choosing [facility]  for your care. Our goal is always to provide you with excellent care. Hearing back from our patients is one way we can continue to improve our services. Please take a few minutes to complete the written survey that you may receive in the mail after your visit with us. Thank you!             Your Updated Medication List - Protect others around you: Learn how to safely use, store and throw away your medicines at www.disposemymeds.org.          This list is accurate as of: 4/14/17  9:23 AM.  Always use your most recent med  list.                   Brand Name Dispense Instructions for use    acetylcysteine 600 MG Caps capsule    N-ACETYL CYSTEINE    30 capsule    Take 2 400 mg caps two times daily for total daily dose of 800 mg       albuterol 108 (90 BASE) MCG/ACT Inhaler    PROAIR HFA/PROVENTIL HFA/VENTOLIN HFA    1 Inhaler    Inhale 2 puffs into the lungs every 6 hours as needed for shortness of breath / dyspnea or wheezing       ARIPiprazole 2 MG tablet    ABILIFY    15 tablet    Take 0.5 tablets (1 mg) by mouth daily       aspirin 81 MG EC tablet     30 tablet    Take 1 tablet (81 mg) by mouth daily       BENADRYL 25 MG capsule   Generic drug:  diphenhydrAMINE      Take 25 mg by mouth At Bedtime.       * blood glucose monitoring meter device kit    no brand specified    1 kit    1 kit 2 times daily Use as directed       * blood glucose monitoring meter device kit    no brand specified    1 kit    Use to test blood sugar 2 times daily or as directed.       * BLOOD GLUCOSE TEST STRIPS Strp     60 strip    Check twice a day.       * blood glucose monitoring test strip    no brand specified    1 Month    Use to test blood sugar 2 times daily or as directed.       * blood glucose monitoring test strip    no brand specified    100 strip    Use to test blood sugars 2 times daily or as directed       cyanocobalamin 1000 MCG tablet    vitamin  B-12    30 tablet    Take 1 tablet by mouth daily.       cycloSPORINE modified 25 MG capsule    GENERIC EQUIVALENT    240 capsule    Take 4 capsules (100 mg) by mouth 2 times daily       econazole nitrate 1 % cream     85 g    Apply topically daily       furosemide 20 MG tablet    LASIX    90 tablet    Take 1 tablet (20 mg) by mouth daily       * gabapentin 300 MG capsule    NEURONTIN    90 capsule    Take 1 capsule (300 mg) by mouth At Bedtime       * gabapentin 600 MG tablet    NEURONTIN    90 tablet    Take 0.5 tablets (300 mg) by mouth daily At evening       latanoprost 0.005 % ophthalmic solution     XALATAN    2.5 mL    Place 1 drop into both eyes At Bedtime       metFORMIN 500 MG 24 hr tablet    GLUCOPHAGE-XR    90 tablet    Take 3 tablets (1,500 mg) by mouth daily (with dinner)       mycophenolate 250 MG capsule    CELLCEPT - GENERIC EQUIVALENT    240 capsule    Take 4 capsules (1,000 mg) by mouth 2 times daily       omeprazole 40 MG capsule    priLOSEC    90 capsule    Take 1 capsule (40 mg) by mouth daily       ondansetron 4 MG ODT tab    ZOFRAN-ODT    20 tablet    Take 1 tablet (4 mg) by mouth every 6 hours as needed       prazosin 5 MG capsule    MINIPRESS    90 capsule    Take 3 capsules (15 mg) by mouth At Bedtime       simvastatin 20 MG tablet    ZOCOR    90 tablet    Take 0.5 tablets (10 mg) by mouth At Bedtime       sulfamethoxazole-trimethoprim 400-80 MG per tablet    BACTRIM/SEPTRA    90 tablet    Take 1 tablet by mouth daily       * thin lancets    NO BRAND SPECIFIED    60 each    Check twice a day       * blood glucose monitoring lancets     1 Box    Use to test blood sugars 2 times daily or as directed.       * blood glucose monitoring lancets     1 Box    Use to test blood sugar 2 times daily or as directed.       topiramate 200 MG tablet    TOPAMAX    60 tablet    Take 1 tablet (200 mg) by mouth 2 times daily       TYLENOL 325 MG tablet   Generic drug:  acetaminophen      Take 325-650 mg by mouth as needed       vilazodone 40 MG Tabs tablet    VIIBRYD    30 tablet    Take 1 tablet (40 mg) by mouth daily       vitamin D 1000 UNITS capsule     30 capsule    3 tabs (3000 units) daily       * Notice:  This list has 10 medication(s) that are the same as other medications prescribed for you. Read the directions carefully, and ask your doctor or other care provider to review them with you.

## 2017-04-14 NOTE — PROGRESS NOTES
Health Maintenance Due   Topic Date Due     SKIN CANCER SCREENING Q1 YR (NO IN BASKET)  1955     DEPRESSION ACTION PLAN Q1 YR (NO INBASKET)  1955     ADVANCE DIRECTIVE PLANNING Q5 YRS (NO INBASKET)  08/28/1975     PAP Q3 YR  12/20/2016     TSH W/ FREE T4 REFLEX Q2 YEAR (NO INBASKET)  02/25/2017     EYE EXAM Q1 YEAR( NO INBASKET)  04/12/2017     COLONOSCOPY Q5 YR INBASKET MESSAGE  03/07/2017       All health maintenance items discussed and pended.  Patient will have a pap smear with GYN.  Patient has an eye appointment next week.  HARISH WILLIS at 8:43 AM on 4/14/2017.

## 2017-04-18 ENCOUNTER — OFFICE VISIT (OUTPATIENT)
Dept: OPHTHALMOLOGY | Facility: CLINIC | Age: 62
End: 2017-04-18
Attending: OPHTHALMOLOGY
Payer: MEDICARE

## 2017-04-18 DIAGNOSIS — E11.9 TYPE 2 DIABETES MELLITUS WITHOUT RETINOPATHY (H): Primary | ICD-10-CM

## 2017-04-18 DIAGNOSIS — Z96.1 PSEUDOPHAKIA: ICD-10-CM

## 2017-04-18 DIAGNOSIS — H40.9 MILD STAGE GLAUCOMA(365.71): ICD-10-CM

## 2017-04-18 DIAGNOSIS — H40.003 GLAUCOMA SUSPECT OF BOTH EYES: Primary | ICD-10-CM

## 2017-04-18 PROCEDURE — 92015 DETERMINE REFRACTIVE STATE: CPT | Mod: ZF

## 2017-04-18 PROCEDURE — 99213 OFFICE O/P EST LOW 20 MIN: CPT | Mod: 25,ZF

## 2017-04-18 PROCEDURE — 92083 EXTENDED VISUAL FIELD XM: CPT | Mod: ZF | Performed by: OPHTHALMOLOGY

## 2017-04-18 RX ORDER — LATANOPROST 50 UG/ML
1 SOLUTION/ DROPS OPHTHALMIC AT BEDTIME
Qty: 2.5 ML | Refills: 11 | Status: SHIPPED | OUTPATIENT
Start: 2017-04-18 | End: 2018-02-27

## 2017-04-18 ASSESSMENT — REFRACTION_MANIFEST
OD_SPHERE: -1.00
OS_CYLINDER: +2.25
OS_ADD: +2.50
OD_ADD: +2.50
OS_SPHERE: -1.50
OS_AXIS: 115
OD_CYLINDER: +1.75
OD_AXIS: 143

## 2017-04-18 ASSESSMENT — REFRACTION_WEARINGRX
OS_CYLINDER: +2.25
SPECS_TYPE: PAL
OD_AXIS: 54
OD_ADD: +2.75
OS_SPHERE: -1.00
OS_AXIS: 106
OS_ADD: +2.75
OD_CYLINDER: +2.00
OD_SPHERE: -1.50

## 2017-04-18 ASSESSMENT — CUP TO DISC RATIO
OD_RATIO: 0.8
OS_RATIO: 0.8

## 2017-04-18 ASSESSMENT — CONF VISUAL FIELD
METHOD: COUNTING FINGERS
OD_NORMAL: 1
OS_NORMAL: 1

## 2017-04-18 ASSESSMENT — VISUAL ACUITY
OS_CC: 20/20
OD_CC: 20/20
METHOD: SNELLEN - LINEAR
CORRECTION_TYPE: GLASSES
OS_CC+: -1

## 2017-04-18 ASSESSMENT — TONOMETRY
IOP_METHOD: APPLANATION
OS_IOP_MMHG: 13
OD_IOP_MMHG: 13

## 2017-04-18 ASSESSMENT — EXTERNAL EXAM - LEFT EYE: OS_EXAM: NORMAL

## 2017-04-18 ASSESSMENT — EXTERNAL EXAM - RIGHT EYE: OD_EXAM: NORMAL

## 2017-04-18 NOTE — NURSING NOTE
Chief Complaints and History of Present Illnesses   Patient presents with     Glaucoma Suspect Evaluation     1 year follow up for Glaucoma suspect.     HPI    Affected eye(s):  Both   Symptoms:     (Comment: Vision is pretty good BE.)   No floaters   No flashes   No redness   No tearing   No itching         Do you have eye pain now?:  No      Comments:  1 year follow up for Glaucoma suspect. 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    Clint Tsang COA  10:43 AM04/18/2017

## 2017-04-18 NOTE — PROGRESS NOTES
Assessment & Plan      Elizabeth Palma is a 60 year old female with the following diagnoses:   1. Type 2 diabetes mellitus without retinopathy (H)    2. Mild stage glaucoma - Both Eyes    3. Pseudophakia - Both Eyes         1) On Metformin for diabetes.    No retinopathy  Stressed good glycemic and hypertensive control  Monitor yearly     2) Mild primary open angle glaucoma (POAG) both eyes     Maximum intraocular pressure 21/18  Family history: negative  Trauma history: negative  Pachymetry : 532/527     Using latanoprost at bedtime both eyes with good compliance  Intraocular pressure great both eyes   Visual field stable today both eyes     3) Dry eye syndrome   Doing well with dryness.      Right lower lid plug in place, left lower lid punctum appeared closed on prior exam.   Continue artificial tears as needed     Recheck visual field in 1 year          Horacio Kinney MD    Attending Physician Attestation: Complete documentation of historical and exam elements from today's encounter can be found in the full encounter summary report (not reduplicated in this progress note). I personally obtained the chief complaint(s) and history of present illness.  I confirmed and edited as necessary the review of systems, past medical/surgical history, family history, social history, and examination findings as documented by others; and I examined the patient myself. I personally reviewed the relevant tests, images, and reports as documented above. I formulated and edited as necessary the assessment and plan and discussed the findings and management plan with the patient and family. - Yonas Underwood MD  4/18/2017   Attending Physician Image/Tesing Attestation: I have personally view and interpreted all testing/images as documented in imaging/procedures

## 2017-04-18 NOTE — LETTER
4/18/2017       RE: Elizabeth Palma  [address]   [facility] [address]     Dear Colleague,    Thank you for referring your patient, Elizabeth Palma, to the EYE CLINIC at [facility]. Please see a copy of my visit note below.    Assessment & Plan      Elizabeth Palma is a 60 year old female with the following diagnoses:   1. Type 2 diabetes mellitus without retinopathy (H)    2. Mild stage glaucoma - Both Eyes    3. Pseudophakia - Both Eyes         1) On Metformin for diabetes.    No retinopathy  Stressed good glycemic and hypertensive control  Monitor yearly     2) Mild primary open angle glaucoma (POAG) both eyes     Maximum intraocular pressure 21/18  Family history: negative  Trauma history: negative  Pachymetry : 532/527     Using latanoprost at bedtime both eyes with good compliance  Intraocular pressure great both eyes   Visual field stable today both eyes     3) Dry eye syndrome   Doing well with dryness.      Right lower lid plug in place, left lower lid punctum appeared closed on prior exam.   Continue artificial tears as needed     Recheck visual field in 1 year          Horacio Kinney MD    Attending Physician Attestation: Complete documentation of historical and exam elements from today's encounter can be found in the full encounter summary report (not reduplicated in this progress note). I personally obtained the chief complaint(s) and history of present illness.  I confirmed and edited as necessary the review of systems, past medical/surgical history, family history, social history, and examination findings as documented by others; and I examined the patient myself. I personally reviewed the relevant tests, images, and reports as documented above. I formulated and edited as necessary the assessment and plan and discussed the findings and management plan with the patient and family. - Yonas Underwood MD  4/18/2017   Attending Physician Image/Radha  Attestation: I have personally view and interpreted all testing/images as documented in imaging/procedures     Again, thank you for allowing me to participate in the care of your patient.      Sincerely,    Yonas Underwood MD

## 2017-04-18 NOTE — MR AVS SNAPSHOT
After Visit Summary   4/18/2017    Elizabeth Palma    MRN: [id number]           Patient Information     Date Of Birth          1955        Visit Information        Provider Department      4/18/2017 10:15 AM Y[facility] Underwood MD [facility]        Today's Diagnoses     Type 2 diabetes mellitus without retinopathy (H)    -  1    Mild stage glaucoma - Both Eyes        Pseudophakia - Both Eyes           Follow-ups after your visit        Follow-up notes from your care team     Return in about 1 year (around 4/18/2018) for Annual Visit, DFE, OCT NFL.      Your next 10 appointments already scheduled     Apr 21, 2017  9:00 AM CDT   NUTRITION VISIT with Francesca Danielle RD   [facility] Surgical Weight Management ([facility] [facility])    [address]  4th [facility] [id number] [phone]            Apr 21, 2017 10:00 AM CDT   DBT Individual Therapy with Era Gonzalez, PhD    Psychiatry Clinic ([facility])    [address] [id number] [phone]            Apr 21, 2017  1:30 PM CDT   Return Visit with Javier Tuttle DPM   [facility]    [address] [place] [address] [phone]            May 02, 2017 10:45 AM CDT   (Arrive by 10:30 AM)   Transplant Skin Check with Lonny Mcduffie MD   [facility] Dermatology ([facility] [employer] [facility])    [address]  3rd Floor  [facility] [id number] [phone]            May 02, 2017  1:00 PM CDT   Adult Med Follow UP with Chaparrita Hatch MD   [facility] Psychiatry ([facility])    [address] [place] [id number] [phone]            May 15, 2017  2:30 PM CDT   (Arrive by 2:15 PM)   RETURN ENDOCRINE with Lizbet Byers MD   [facility] Endocrinology ([facility] [facility])    [address] [address]   [phone]            May 19, 2017  9:30 AM CDT   NUTRITION VISIT with Francesca Danielle RD   [facility] Surgical Weight Management ([facility])    [address] [place] [address] [phone]            May 22, 2017 10:45 AM CDT   (Arrive by 10:30 AM)   Return Visit with Prakash Irene MD   [facility] Medical Weight Management ([facility])    [address] [place] [address] [phone]            Jun 16, 2017  9:00 AM CDT   NUTRITION VISIT with Francesca Danielle RD   [facility] Surgical Weight Management ([facility])    [address] [place] [address] [phone]            Jul 14, 2017  9:00 AM CDT   NUTRITION VISIT with SUJATA Stapleton [facility] Surgical Weight Management ([facility])    [address] [place] [id number] [phone]              Who to contact     Please call your clinic at [phone] to:    Ask questions about your health    Make or cancel appointments    Discuss your medicines    Learn about your test results    Speak to your doctor   If you have compliments or concerns about an experience at your clinic, or if you wish to file a complaint, please contact [facility] Patient Relations at [phone] or email us at [email or web address]         Additional Information About Your Visit        Caty Information     Caty gives you secure access to your electronic health record. If you see a primary care provider, you can also send messages to your care team and make appointments. If you have questions, please call your primary care clinic.  If you do not have a primary care provider, please call [phone] and they will assist you.      Caty is an  electronic gateway that provides easy, online access to your medical records. With EGEN, you can request a clinic appointment, read your test results, renew a prescription or communicate with your care team.     To access your existing account, please contact your [facility] or call [phone] for assistance.        Care EveryWhere ID     This is your Care EveryWhere ID. This could be used by other organizations to access your [place] medical records  [id number]         Blood Pressure from Last 3 Encounters:   04/14/17 108/75   03/20/17 117/80   02/17/17 130/75    Weight from Last 3 Encounters:   04/14/17 79.4 kg (175 lb)   03/20/17 80.8 kg (178 lb 3.2 oz)   02/17/17 82.1 kg (180 lb 14.4 oz)              We Performed the Following     OVF 24-2 Dynamic OU          Today's Medication Changes          These changes are accurate as of: 4/18/17  1:15 PM.  If you have any questions, ask your nurse or doctor.               These medicines have changed or have updated prescriptions.        Dose/Directions    econazole nitrate 1 % cream   This may have changed:    - when to take this  - reasons to take this   Used for:  Type II or unspecified type diabetes mellitus with neurological manifestations, not stated as uncontrolled, Dermatophytosis of foot        Apply topically daily   Quantity:  85 g   Refills:  3            Where to get your medicines      These medications were sent to [id number] - [place] - [address] AT [facility] [address]  [address], [place] [address]    Hours:  24-hours Phone:  [phone]     latanoprost 0.005 % ophthalmic solution                Primary Care Provider Office Phone # Fax #    Horacio Sheridan [phone]        [facility] [address] [facility] [address]  [facility] [address]        Thank you!     Thank you for choosing [facility]  for your care. Our goal is always to provide you with excellent care. Hearing  back from our patients is one way we can continue to improve our services. Please take a few minutes to complete the written survey that you may receive in the mail after your visit with us. Thank you!             Your Updated Medication List - Protect others around you: Learn how to safely use, store and throw away your medicines at www.disposemymeds.org.          This list is accurate as of: 4/18/17  1:15 PM.  Always use your most recent med list.                   Brand Name Dispense Instructions for use    acetylcysteine 600 MG Caps capsule    N-ACETYL CYSTEINE    30 capsule    Take 2 400 mg caps two times daily for total daily dose of 800 mg       albuterol 108 (90 BASE) MCG/ACT Inhaler    PROAIR HFA/PROVENTIL HFA/VENTOLIN HFA    1 Inhaler    Inhale 2 puffs into the lungs every 6 hours as needed for shortness of breath / dyspnea or wheezing       ARIPiprazole 2 MG tablet    ABILIFY    15 tablet    Take 0.5 tablets (1 mg) by mouth daily       aspirin 81 MG EC tablet     30 tablet    Take 1 tablet (81 mg) by mouth daily       BENADRYL 25 MG capsule   Generic drug:  diphenhydrAMINE      Take 25 mg by mouth At Bedtime.       * blood glucose monitoring meter device kit    no brand specified    1 kit    1 kit 2 times daily Use as directed       * blood glucose monitoring meter device kit    no brand specified    1 kit    Use to test blood sugar 2 times daily or as directed.       * BLOOD GLUCOSE TEST STRIPS Strp     60 strip    Check twice a day.       * blood glucose monitoring test strip    no brand specified    1 Month    Use to test blood sugar 2 times daily or as directed.       * blood glucose monitoring test strip    no brand specified    100 strip    Use to test blood sugars 2 times daily or as directed       cyanocobalamin 1000 MCG tablet    vitamin  B-12    30 tablet    Take 1 tablet by mouth daily.       cycloSPORINE modified 25 MG capsule    GENERIC EQUIVALENT    240 capsule    Take 4 capsules (100 mg) by  mouth 2 times daily       econazole nitrate 1 % cream     85 g    Apply topically daily       furosemide 20 MG tablet    LASIX    90 tablet    Take 1 tablet (20 mg) by mouth daily       * gabapentin 300 MG capsule    NEURONTIN    90 capsule    Take 1 capsule (300 mg) by mouth At Bedtime       * gabapentin 600 MG tablet    NEURONTIN    90 tablet    Take 0.5 tablets (300 mg) by mouth daily At evening       latanoprost 0.005 % ophthalmic solution    XALATAN    2.5 mL    Place 1 drop into both eyes At Bedtime       metFORMIN 500 MG 24 hr tablet    GLUCOPHAGE-XR    90 tablet    Take 3 tablets (1,500 mg) by mouth daily (with dinner)       mycophenolate 250 MG capsule    CELLCEPT - GENERIC EQUIVALENT    240 capsule    Take 4 capsules (1,000 mg) by mouth 2 times daily       omeprazole 40 MG capsule    priLOSEC    90 capsule    Take 1 capsule (40 mg) by mouth daily       ondansetron 4 MG ODT tab    ZOFRAN-ODT    20 tablet    Take 1 tablet (4 mg) by mouth every 6 hours as needed       prazosin 5 MG capsule    MINIPRESS    90 capsule    Take 3 capsules (15 mg) by mouth At Bedtime       REFRESH OP      Apply to eye as needed Both eyes       simvastatin 20 MG tablet    ZOCOR    90 tablet    Take 0.5 tablets (10 mg) by mouth At Bedtime       sulfamethoxazole-trimethoprim 400-80 MG per tablet    BACTRIM/SEPTRA    90 tablet    Take 1 tablet by mouth daily       * thin lancets    NO BRAND SPECIFIED    60 each    Check twice a day       * blood glucose monitoring lancets     1 Box    Use to test blood sugars 2 times daily or as directed.       * blood glucose monitoring lancets     1 Box    Use to test blood sugar 2 times daily or as directed.       topiramate 200 MG tablet    TOPAMAX    60 tablet    Take 1 tablet (200 mg) by mouth 2 times daily       TYLENOL 325 MG tablet   Generic drug:  acetaminophen      Take 325-650 mg by mouth as needed       vilazodone 40 MG Tabs tablet    VIIBRYD    30 tablet    Take 1 tablet (40 mg) by mouth  daily       vitamin D 1000 UNITS capsule     30 capsule    3 tabs (3000 units) daily       * Notice:  This list has 10 medication(s) that are the same as other medications prescribed for you. Read the directions carefully, and ask your doctor or other care provider to review them with you.

## 2017-04-19 DIAGNOSIS — Z12.11 SCREEN FOR COLON CANCER: ICD-10-CM

## 2017-04-19 LAB — HEMOCCULT STL QL IA: NEGATIVE

## 2017-04-19 PROCEDURE — 82274 ASSAY TEST FOR BLOOD FECAL: CPT | Performed by: INTERNAL MEDICINE

## 2017-04-20 DIAGNOSIS — N18.5 CHRONIC KIDNEY DISEASE, STAGE V (H): ICD-10-CM

## 2017-04-20 NOTE — TELEPHONE ENCOUNTER
Spoke with pt, informed the results and recommendations. New Rx was sent to the pharmacy.  -------------------------------------------    Notes Recorded by Horacio Sheridan MD on 4/17/2017 at 7:00 AM  Please call with results.     Her LDL has climbed a bit.  It is not clear why.  Therefore I would like her to increase her simvastatin from 10mg (0.5 tablet) to 20mg (1 tablet) per day.      Horacio Sheridan

## 2017-04-21 ENCOUNTER — OFFICE VISIT (OUTPATIENT)
Dept: PSYCHIATRY | Facility: CLINIC | Age: 62
End: 2017-04-21
Attending: PSYCHOLOGIST
Payer: MEDICARE

## 2017-04-21 ENCOUNTER — MEDICAL CORRESPONDENCE (OUTPATIENT)
Dept: HEALTH INFORMATION MANAGEMENT | Facility: CLINIC | Age: 62
End: 2017-04-21

## 2017-04-21 ENCOUNTER — ALLIED HEALTH/NURSE VISIT (OUTPATIENT)
Dept: SURGERY | Facility: CLINIC | Age: 62
End: 2017-04-21

## 2017-04-21 DIAGNOSIS — F41.1 GENERALIZED ANXIETY DISORDER: ICD-10-CM

## 2017-04-21 DIAGNOSIS — F33.1 MAJOR DEPRESSIVE DISORDER, RECURRENT EPISODE, MODERATE (H): Primary | ICD-10-CM

## 2017-04-21 DIAGNOSIS — F43.10 POSTTRAUMATIC STRESS DISORDER: ICD-10-CM

## 2017-04-21 RX ORDER — SIMVASTATIN 20 MG
20 TABLET ORAL AT BEDTIME
Qty: 90 TABLET | Refills: 3 | Status: SHIPPED | OUTPATIENT
Start: 2017-04-21 | End: 2018-04-16

## 2017-04-21 NOTE — PATIENT INSTRUCTIONS
Goals:    1. Eat lean protein at each meal (3 times/day) along with a non-starchy vegetable or whole fruit, up to 1/2 c carb at a meal.   -Try having some fish for a change.               -Try the lower sodium, low fat cottage cheese.   2. If needing a snack, have vegetables.  4. Restart exercise routine (at least 1 time/week walking on the treadmill 15 min + additional 30 min walking).  (Try a class again.)

## 2017-04-21 NOTE — PROGRESS NOTES
"Nutrition Reassessment  Reason For Visit:  Elizabeth Palma is a 61 year-old female with type 2 DM (oral med management) presenting today for nutrition follow-up, s/p \"gastric bypass in 1990 at [facility]\" per Pt report.  She is seeing medical weight management as well.  She was referred by Dr. Irene.  Note: Pt had a kidney transplant on March 20, 2014.    Pt was accompanied by her .     Anthropometrics:  Height: 61\"  Pre-op Weight: 265 lbs per Pt report; lowest weight after bariatric surgery: 165-170 lbs (25 years ago)  (Pt reported that she initially was at 215 lbs in 2015 prior to seeing writer.)    Current Weight (4/21/17): 175.4 lbs (-0.6 lbs over the past month)    Current Vitamins/Minerals: 3000 International Units vitamin D/day, Calcium, vitamin C, vitamin B12 daily, B-complex  *Pt stated that she was told not to take other vitamins/minerals by MD.    Nutrition History:  Lactose intolerance ever since bariatric surgery.  Pt stated that she has osteoporosis; however, she stated that her doctors don't want her to take any vitamins or minerals due to her kidney transplant.    Progress with Previous Goals:  1. Eat lean protein at each meal (3 times/day) along with a non-starchy vegetable or whole fruit, up to 1/2 c carb at a meal.- Pt will have a eggs and turkey, but is getting tired of it.     -Have your 20 g protein bar for breakfast.  - Pt stated that she got tired of protein bars.     -Try having 2-3 slices of turkey meat for lunch in a tommy lettuce leaf.- Pt has been having this, but is tired of it.   2. Limit peanut butter to 1 Tbsp (during day), using hard boiled eggs, string cheese, fish, softer/slow-cooked meats/poultry, and light Greek yogurt as protein sources instead.  3. If needing a snack, have vegetables.- Meeting.  4. Restart exercise routine (at least 1 time/week walking on the treadmill 15 min + additional 30 min walking).  (Try a class again.)- No routine exercise outside of ADLs " currently.  Pt stated that she has been tired. She was taken off of one of her diabetes medicines, which she was told may help her tiredness.     Diet Recall:   B: nothing  L: 1 egg, 1 toast with a little butter or dry  D: hamburger ami with veg or stir-goss  Snacks: raw veg with low-fat sour cream dip (8 g protein per serving dip)    Previous Note:    *Pt stated that she will not record food intake due to the fact that every time she does this, she simply does not eat as she doesn't want to record.  She stated that her therapist strongly advises against recording food intake for this reason.    Nutrition Prescription:  Grams Protein: 60 (minimum)  Amount of Fluid: 48-64 oz    Nutrition Diagnosis  Previous: Obesity related to excessive energy intake as evidenced by BMI > 30.- continues    Intervention  Intervention At Appointment:  Materials/Education provided:  Praised Pt for not gaining weight.  Reinforced previous goals including optimizing lean protein in light of her h/o a RNY GB.  Continued to encourage her to restart fun exercise (No class) to keep her interested and motivated.  Gave encouragement and support to continue.  Provided Pt with list of goals.    Patient Understanding: good  Expected Compliance: good    Goals:    1. Eat lean protein at each meal (3 times/day) along with a non-starchy vegetable or whole fruit, up to 1/2 c carb at a meal.   -Try having some fish for a change.               -Try the lower sodium, low fat cottage cheese.   2. If needing a snack, have vegetables.  4. Restart exercise routine (at least 1 time/week walking on the treadmill 15 min + additional 30 min walking).  (Try a class again.)    Follow-Up: 1 month    Time spent with patient: 15 minutes.  Francesca Danielle, RD, LD, CLT  Pager: [phone]

## 2017-04-21 NOTE — MR AVS SNAPSHOT
After Visit Summary   4/21/2017    Elizabeth Palma    MRN: [id number]           Patient Information     Date Of Birth          1955        Visit Information        Provider Department      4/21/2017 10:00 AM Era Gonzalez, PhD  Psychiatry Clinic        Today's Diagnoses     Major depressive disorder, recurrent episode, moderate (H)    -  1    Posttraumatic stress disorder        Generalized anxiety disorder           Follow-ups after your visit        Your next 10 appointments already scheduled     May 22, 2017  9:30 AM CDT   DX VERTEBRAL FRACTURE ANALYSIS LAT ONLY with UCDX1   [facility] ([facility])    [address]  1st Floor  [facility] [id number] [phone]           Please do not take any of the following 24 hours prior to the day of your exam: vitamins, calcium tablets, antacids.            May 22, 2017 10:45 AM CDT   (Arrive by 10:30 AM)   Return Visit with Prakash Irene MD   [facility] ([facility] [place])    [address]  4th Floor  [facility] [address]   [phone]            May 26, 2017 10:00 AM CDT   DBT Individual Therapy with Era Gonzalez, PhD    Psychiatry Clinic ([facility])    [place] [id number]  [address] [address] [phone]            Jun 06, 2017  9:15 AM CDT   (Arrive by 9:00 AM)   Transplant Skin Check with Lonny Mcduffie MD   [facility] D[facility]at[facility] ([facility] [place])    [address]  3rd Floor  [facility] [address]   [phone]            Jun 06, 2017  1:00 PM CDT   Adult Med Follow UP with Chaparrita Hatch MD   [facility] Psychiatry ([facility])    [address] [place] [address]  [facility] [address]   [phone]            Jun 16, 2017  9:30 AM CDT   NUTRITION VISIT with Francesca Danielle RD   [facility]  Weight Management ([facility] [place])    [address] [place] [address]   [phone]            Jul 14, 2017  9:30 AM CDT   NUTRITION VISIT with Francesca Danielle RD   [facility] Surgical Weight Management ([facility] [place])    [address] [place] [address]   [phone]            Aug 18, 2017  9:15 AM CDT   (Arrive by 9:00 AM)   Return Visit with Prakash Irene MD   [facility] Medical Weight Management ([facility] [place])    [address] [place] [address]   [phone]            Aug 21, 2017 10:00 AM CDT   Return Visit with Javier Tuttle DPM   [facility]    [address] [place] [address] [phone]            Aug 25, 2017  9:30 AM CDT   NUTRITION VISIT with Francesca Danielle RD   [facility] Surgical Weight Management ([facility])    [address] [place] [address] [phone]              Who to contact     Please call your clinic at [phone] to:    Ask questions about your health    Make or cancel appointments    Discuss your medicines    Learn about your test results    Speak to your doctor   If you have compliments or concerns about an experience at your clinic, or if you wish to file a complaint, please contact [facility] Patient Relations at [phone] or email us at [email or web address]         Additional Information About Your Visit        MyChart Information     [employer] gives you secure access to your electronic health record. If you see a primary care provider, you can also send messages to your care team and make appointments. If you have questions, please call your primary care clinic.  If you do not have a primary care provider, please call [phone] and they will assist  you.      [employer] is an electronic gateway that provides easy, online access to your medical records. With [employer], you can request a clinic appointment, read your test results, renew a prescription or communicate with your care team.     To access your existing account, please contact your [facility] or call [phone] for assistance.        Care EveryWhere ID     This is your Care EveryWhere ID. This could be used by other organizations to access your [facility] medical records  [id number]         Blood Pressure from Last 3 Encounters:   05/15/17 118/77   05/12/17 105/73   05/02/17 129/76    Weight from Last 3 Encounters:   05/15/17 79.7 kg (175 lb 12.8 oz)   05/02/17 79.4 kg (175 lb)   04/14/17 79.4 kg (175 lb)              Today, you had the following     No orders found for display         Today's Medication Changes          These changes are accurate as of: 4/21/17 11:59 PM.  If you have any questions, ask your nurse or doctor.               These medicines have changed or have updated prescriptions.        Dose/Directions    econazole nitrate 1 % cream   This may have changed:    - when to take this  - reasons to take this   Used for:  Type II or unspecified type diabetes mellitus with neurological manifestations, not stated as uncontrolled, Dermatophytosis of foot        Apply topically daily   Quantity:  85 g   Refills:  3                Primary Care Provider Office Phone # Fax #    Horacio Sheridan [phone]        [facility] [address] [facility] [address]  [facility] [address]        Thank you!     Thank you for choosing PSYCHIATRY CLINIC  for your care. Our goal is always to provide you with excellent care. Hearing back from our patients is one way we can continue to improve our services. Please take a few minutes to complete the written survey that you may receive in the mail after your visit with us. Thank you!             Your Updated Medication List -  Protect others around you: Learn how to safely use, store and throw away your medicines at www.disposemymeds.org.          This list is accurate as of: 4/21/17 11:59 PM.  Always use your most recent med list.                   Brand Name Dispense Instructions for use    acetylcysteine 600 MG Caps capsule    N-ACETYL CYSTEINE    30 capsule    Take 2 400 mg caps two times daily for total daily dose of 800 mg       albuterol 108 (90 BASE) MCG/ACT Inhaler    PROAIR HFA/PROVENTIL HFA/VENTOLIN HFA    1 Inhaler    Inhale 2 puffs into the lungs every 6 hours as needed for shortness of breath / dyspnea or wheezing       aspirin 81 MG EC tablet     30 tablet    Take 1 tablet (81 mg) by mouth daily       BENADRYL 25 MG capsule   Generic drug:  diphenhydrAMINE      Take 25 mg by mouth At Bedtime.       * blood glucose monitoring meter device kit    no brand specified    1 kit    1 kit 2 times daily Use as directed       * blood glucose monitoring meter device kit    no brand specified    1 kit    Use to test blood sugar 2 times daily or as directed.       * BLOOD GLUCOSE TEST STRIPS Strp     60 strip    Check twice a day.       * blood glucose monitoring test strip    no brand specified    1 Month    Use to test blood sugar 2 times daily or as directed.       * blood glucose monitoring test strip    no brand specified    100 strip    Use to test blood sugars 2 times daily or as directed       cyanocobalamin 1000 MCG tablet    vitamin  B-12    30 tablet    Take 1 tablet by mouth daily.       econazole nitrate 1 % cream     85 g    Apply topically daily       furosemide 20 MG tablet    LASIX    90 tablet    Take 1 tablet (20 mg) by mouth daily       * gabapentin 300 MG capsule    NEURONTIN    90 capsule    Take 1 capsule (300 mg) by mouth At Bedtime       * gabapentin 600 MG tablet    NEURONTIN    90 tablet    Take 0.5 tablets (300 mg) by mouth daily At evening       latanoprost 0.005 % ophthalmic solution    XALATAN    2.5 mL     Place 1 drop into both eyes At Bedtime       metFORMIN 500 MG 24 hr tablet    GLUCOPHAGE-XR    90 tablet    Take 3 tablets (1,500 mg) by mouth daily (with dinner)       mycophenolate 250 MG capsule    CELLCEPT - GENERIC EQUIVALENT    240 capsule    Take 4 capsules (1,000 mg) by mouth 2 times daily       ondansetron 4 MG ODT tab    ZOFRAN-ODT    20 tablet    Take 1 tablet (4 mg) by mouth every 6 hours as needed       REFRESH OP      Apply to eye as needed Both eyes       simvastatin 20 MG tablet    ZOCOR    90 tablet    Take 1 tablet (20 mg) by mouth At Bedtime       sulfamethoxazole-trimethoprim 400-80 MG per tablet    BACTRIM/SEPTRA    90 tablet    Take 1 tablet by mouth daily       * thin lancets    NO BRAND SPECIFIED    60 each    Check twice a day       * blood glucose monitoring lancets     1 Box    Use to test blood sugars 2 times daily or as directed.       * blood glucose monitoring lancets     1 Box    Use to test blood sugar 2 times daily or as directed.       topiramate 200 MG tablet    TOPAMAX    60 tablet    Take 1 tablet (200 mg) by mouth 2 times daily       TYLENOL 325 MG tablet   Generic drug:  acetaminophen      Take 325-650 mg by mouth as needed       vitamin D 1000 UNITS capsule     30 capsule    3 tabs (3000 units) daily       * Notice:  This list has 10 medication(s) that are the same as other medications prescribed for you. Read the directions carefully, and ask your doctor or other care provider to review them with you.

## 2017-04-21 NOTE — MR AVS SNAPSHOT
MRN:[id number]                      After Visit Summary   4/21/2017    Elizabeth Palma    MRN: [id number]           Visit Information        Provider Department      4/21/2017 9:00 AM Francesca Danielle RD [facility] Surgical Weight Management        Your next 10 appointments already scheduled     Apr 21, 2017 10:00 AM CDT   DBT Individual Therapy with Era Gonzalez, PhD    Psychiatry Clinic ([facility])    [place] [id number]  [address] [facility] [address]   [phone]            May 02, 2017 10:45 AM CDT   (Arrive by 10:30 AM)   Transplant Skin Check with Lonny Mcduffie MD   [facility] Dermatology ([facility])    [address] [place] [id number] [phone]            May 02, 2017  1:00 PM CDT   Adult Med Follow UP with Chaparrita Hatch MD   [facility] ([facility])    [address] [id number] [phone]            May 03, 2017 10:30 AM CDT   Return Visit with Javier Tuttle DPM   [facility] ([facility])    [address] [place] [address] [phone]            May 15, 2017  2:30 PM CDT   (Arrive by 2:15 PM)   RETURN ENDOCRINE with Lizbet Byers MD   [facility] Endocrinology ([facility])    [address] [place] [id number] [phone]            May 19, 2017  9:30 AM CDT   NUTRITION VISIT with Francesca Danielle RD   [facility] Weight Management ([facility])    [address] [place] [id number] [phone]            May 22, 2017 10:45 AM CDT   (Arrive by 10:30 AM)   Return Visit with Prakash Irene MD   [facility] Medical Weight Management ([facility])    [address] [place] [address]  [address]   [phone]            Jun 16, 2017  9:00 AM CDT   NUTRITION VISIT with Francesca Danielle RD   [facility] Weight Management ([facility])    [address] [place] [address]   [phone]            Jul 14, 2017  9:00 AM CDT   NUTRITION VISIT with Francesca Danielle RD   [facility] Weight Management ([facility])    [address] [place] [address]   [phone]            Aug 18, 2017  9:15 AM CDT   (Arrive by 9:00 AM)   Return Visit with Prakash Irene MD   [facility] Weight Management ([facility])    [address] [place] [address]   [phone]              Care Instructions    Goals:    1. Eat lean protein at each meal (3 times/day) along with a non-starchy vegetable or whole fruit, up to 1/2 c carb at a meal.   -Try having some fish for a change.               -Try the lower sodium, low fat cottage cheese.   2. If needing a snack, have vegetables.  4. Restart exercise routine (at least 1 time/week walking on the treadmill 15 min + additional 30 min walking).  (Try a class again.)       [employer] Information     [employer] gives you secure access to your electronic health record. If you see a primary care provider, you can also send messages to your care team and make appointments. If you have questions, please call your primary care clinic.  If you do not have a primary care provider, please call [phone] and they will assist you.      [employer] is an electronic gateway that provides easy, online access to your medical records. With [employer], you can request a clinic appointment, read your test results, renew a prescription or communicate with your care team.     To access your existing account, please contact your [facility] or call [phone] for assistance.        Care  EveryWhere ID     This is your Care EveryWhere ID. This could be used by other organizations to access your [facility] medical records  [id number]

## 2017-04-24 DIAGNOSIS — K21.9 GASTROESOPHAGEAL REFLUX DISEASE WITHOUT ESOPHAGITIS: ICD-10-CM

## 2017-04-24 NOTE — TELEPHONE ENCOUNTER
omeprazole  Last Written Prescription Date:  5/9/16  Last Fill Quantity: 90,   # refills: 3  Last Office Visit : 4/14/17  Future Office visit:  10/9/17  Routed due to drug warning

## 2017-04-25 ENCOUNTER — OFFICE VISIT (OUTPATIENT)
Dept: PSYCHIATRY | Facility: CLINIC | Age: 62
End: 2017-04-25
Attending: PSYCHOLOGIST
Payer: MEDICARE

## 2017-04-25 DIAGNOSIS — F43.10 POSTTRAUMATIC STRESS DISORDER: ICD-10-CM

## 2017-04-25 DIAGNOSIS — F33.1 MAJOR DEPRESSIVE DISORDER, RECURRENT EPISODE, MODERATE (H): Primary | ICD-10-CM

## 2017-04-25 DIAGNOSIS — F41.1 GENERALIZED ANXIETY DISORDER: ICD-10-CM

## 2017-04-25 DIAGNOSIS — Z94.0 KIDNEY REPLACED BY TRANSPLANT: ICD-10-CM

## 2017-04-25 RX ORDER — CYCLOSPORINE 25 MG/1
100 CAPSULE, LIQUID FILLED ORAL 2 TIMES DAILY
Qty: 240 CAPSULE | Refills: 6 | Status: SHIPPED | OUTPATIENT
Start: 2017-04-25 | End: 2017-08-28

## 2017-04-25 RX ORDER — OMEPRAZOLE 40 MG/1
40 CAPSULE, DELAYED RELEASE ORAL DAILY
Qty: 90 CAPSULE | Refills: 3 | Status: SHIPPED | OUTPATIENT
Start: 2017-04-25 | End: 2018-04-16

## 2017-04-25 NOTE — MR AVS SNAPSHOT
After Visit Summary   4/25/2017    Elizabeth Palma    MRN: [id number]           Patient Information     Date Of Birth          1955        Visit Information        Provider Department      4/25/2017 10:30 AM Era Gonzalez, PhD  Psychiatry Clinic        Today's Diagnoses     Major depressive disorder, recurrent episode, moderate (H)    -  1    Posttraumatic stress disorder        Generalized anxiety disorder           Follow-ups after your visit        Your next 10 appointments already scheduled     May 12, 2017 10:00 AM CDT   Return Visit with Javier Tuttle DPM   [facility] ([facility])    [address] [place] [address]   [phone]            May 15, 2017  2:30 PM CDT   (Arrive by 2:15 PM)   RETURN ENDOCRINE with Lizbet Byers MD   [facility] ([facility])    [address] [place] [address]  3rd [facility] [address] [phone]            May 19, 2017  9:00 AM CDT   LAB with  LAB   [facility] ([facility])    [address] [place] [address] [phone]           Patient must bring picture ID.  Patient should be prepared to give a urine specimen  Please do not eat 10-12 hours before your appointment if you are coming in fasting for labs on lipids, cholesterol, or glucose (sugar).  Pregnant women should follow their Care Team instructions. Water with medications is okay. Do not drink coffee or other fluids.   If you have concerns about taking  your medications, please ask at office or if scheduling via TapTapt, send a message by clicking on Secure Messaging, Message Your Care Team.            May 19, 2017  9:30 AM CDT   NUTRITION VISIT with Francesca Danielle RD   [facility] ([facility])    [address] [place] [address]  4th [facility] [address] [phone]             May 22, 2017 10:45 AM CDT   (Arrive by 10:30 AM)   Return Visit with Prakash Irene MD   [facility] ([facility])    [address] [id number] [phone]            May 26, 2017 10:00 AM CDT   DBT Individual Therapy with Era Gonzalez, PhD    [facility] ([email or web address])    [place] [id number]  [address] [facility] [id number] [phone]            Jun 06, 2017  9:15 AM CDT   (Arrive by 9:00 AM)   Transplant Skin Check with Lonny Mcduffie MD   [facility] ([facility])    [address] [place] [address]  3rd [facility] [id number] [phone]            Jun 06, 2017  1:00 PM CDT   Adult Med Follow UP with Chaparrita Hatch MD   [facility] ([facility])    [address]  [facility] [address]   [phone]            Jun 16, 2017  9:30 AM CDT   NUTRITION VISIT with Francesca Danielle RD   [facility] ([facility])    [address] [id number] [phone]            Jul 14, 2017  9:30 AM CDT   NUTRITION VISIT with SUJATA Stapleton [facility] ([facility])    [address] [place] [id number] [phone]              Who to contact     Please call your clinic at [phone] to:    Ask questions about your health    Make or cancel appointments    Discuss your medicines    Learn about your test results    Speak to your doctor   If you have compliments or concerns about an experience at your clinic, or if you wish to file a complaint, please contact [facility] Patient Relations at [phone] or email us at [email or web address]         Additional Information About Your  Visit        Droid system master Information     Droid system master gives you secure access to your electronic health record. If you see a primary care provider, you can also send messages to your care team and make appointments. If you have questions, please call your primary care clinic.  If you do not have a primary care provider, please call [phone] and they will assist you.      Droid system master is an electronic gateway that provides easy, online access to your medical records. With Droid system master, you can request a clinic appointment, read your test results, renew a prescription or communicate with your care team.     To access your existing account, please contact your [facility] or call [phone] for assistance.        Care EveryWhere ID     This is your Care EveryWhere ID. This could be used by other organizations to access your [facility] medical records  [id number]         Blood Pressure from Last 3 Encounters:   05/02/17 129/76   04/14/17 108/75   04/04/17 122/85    Weight from Last 3 Encounters:   05/02/17 79.4 kg (175 lb)   04/14/17 79.4 kg (175 lb)   04/04/17 80.4 kg (177 lb 3.2 oz)              Today, you had the following     No orders found for display         Today's Medication Changes          These changes are accurate as of: 4/25/17 11:59 PM.  If you have any questions, ask your nurse or doctor.               These medicines have changed or have updated prescriptions.        Dose/Directions    econazole nitrate 1 % cream   This may have changed:    - when to take this  - reasons to take this   Used for:  Type II or unspecified type diabetes mellitus with neurological manifestations, not stated as uncontrolled, Dermatophytosis of foot        Apply topically daily   Quantity:  85 g   Refills:  3            Where to get your medicines      These medications were sent to [facility] - [place], [place] - [address]   [address] , [facility] [address]     Hours:  Mon-Fri 8:30am-5:00pm Toll Free [phone] Phone:  [phone]     cycloSPORINE modified 25 MG capsule                Primary Care Provider Office Phone # Fax #    Horacio Sheridan [phone]        [facility] [address] [facility] [address]  [facility] [address]        Thank you!     Thank you for choosing PSYCHIATRY CLINIC  for your care. Our goal is always to provide you with excellent care. Hearing back from our patients is one way we can continue to improve our services. Please take a few minutes to complete the written survey that you may receive in the mail after your visit with us. Thank you!             Your Updated Medication List - Protect others around you: Learn how to safely use, store and throw away your medicines at www.disposemymeds.org.          This list is accurate as of: 4/25/17 11:59 PM.  Always use your most recent med list.                   Brand Name Dispense Instructions for use    acetylcysteine 600 MG Caps capsule    N-ACETYL CYSTEINE    30 capsule    Take 2 400 mg caps two times daily for total daily dose of 800 mg       albuterol 108 (90 BASE) MCG/ACT Inhaler    PROAIR HFA/PROVENTIL HFA/VENTOLIN HFA    1 Inhaler    Inhale 2 puffs into the lungs every 6 hours as needed for shortness of breath / dyspnea or wheezing       aspirin 81 MG EC tablet     30 tablet    Take 1 tablet (81 mg) by mouth daily       BENADRYL 25 MG capsule   Generic drug:  diphenhydrAMINE      Take 25 mg by mouth At Bedtime.       blood glucose monitoring lancets     1 Box    Use to test blood sugar 2 times daily or as directed.       blood glucose monitoring meter device kit    no brand specified    1 kit    Use to test blood sugar 2 times daily or as directed.       blood glucose monitoring test strip    no brand specified    100 strip    Use to test blood sugars 2 times daily or as directed       cyanocobalamin 1000 MCG tablet    vitamin  B-12    30 tablet    Take 1 tablet by mouth daily.        cycloSPORINE modified 25 MG capsule    GENERIC EQUIVALENT    240 capsule    Take 4 capsules (100 mg) by mouth 2 times daily       econazole nitrate 1 % cream     85 g    Apply topically daily       furosemide 20 MG tablet    LASIX    90 tablet    Take 1 tablet (20 mg) by mouth daily       * gabapentin 300 MG capsule    NEURONTIN    90 capsule    Take 1 capsule (300 mg) by mouth At Bedtime       * gabapentin 600 MG tablet    NEURONTIN    90 tablet    Take 0.5 tablets (300 mg) by mouth daily At evening       latanoprost 0.005 % ophthalmic solution    XALATAN    2.5 mL    Place 1 drop into both eyes At Bedtime       metFORMIN 500 MG 24 hr tablet    GLUCOPHAGE-XR    90 tablet    Take 3 tablets (1,500 mg) by mouth daily (with dinner)       mycophenolate 250 MG capsule    CELLCEPT - GENERIC EQUIVALENT    240 capsule    Take 4 capsules (1,000 mg) by mouth 2 times daily       omeprazole 40 MG capsule    priLOSEC    90 capsule    Take 1 capsule (40 mg) by mouth daily       ondansetron 4 MG ODT tab    ZOFRAN-ODT    20 tablet    Take 1 tablet (4 mg) by mouth every 6 hours as needed       REFRESH OP      Apply to eye as needed Both eyes       simvastatin 20 MG tablet    ZOCOR    90 tablet    Take 1 tablet (20 mg) by mouth At Bedtime       sulfamethoxazole-trimethoprim 400-80 MG per tablet    BACTRIM/SEPTRA    90 tablet    Take 1 tablet by mouth daily       topiramate 200 MG tablet    TOPAMAX    60 tablet    Take 1 tablet (200 mg) by mouth 2 times daily       TYLENOL 325 MG tablet   Generic drug:  acetaminophen      Take 325-650 mg by mouth as needed       vitamin D 1000 UNITS capsule     30 capsule    3 tabs (3000 units) daily       * Notice:  This list has 2 medication(s) that are the same as other medications prescribed for you. Read the directions carefully, and ask your doctor or other care provider to review them with you.

## 2017-05-02 ENCOUNTER — OFFICE VISIT (OUTPATIENT)
Dept: PSYCHIATRY | Facility: CLINIC | Age: 62
End: 2017-05-02
Attending: PSYCHOLOGIST
Payer: MEDICARE

## 2017-05-02 ENCOUNTER — OFFICE VISIT (OUTPATIENT)
Dept: PSYCHIATRY | Facility: CLINIC | Age: 62
End: 2017-05-02

## 2017-05-02 VITALS
SYSTOLIC BLOOD PRESSURE: 129 MMHG | HEART RATE: 71 BPM | WEIGHT: 175 LBS | BODY MASS INDEX: 33.04 KG/M2 | HEIGHT: 61 IN | DIASTOLIC BLOOD PRESSURE: 76 MMHG

## 2017-05-02 DIAGNOSIS — F33.1 MAJOR DEPRESSIVE DISORDER, RECURRENT EPISODE, MODERATE (H): ICD-10-CM

## 2017-05-02 DIAGNOSIS — F41.1 GENERALIZED ANXIETY DISORDER: ICD-10-CM

## 2017-05-02 DIAGNOSIS — F51.5 NIGHTMARES ASSOCIATED WITH CHRONIC POST-TRAUMATIC STRESS DISORDER: ICD-10-CM

## 2017-05-02 DIAGNOSIS — F33.1 MAJOR DEPRESSIVE DISORDER, RECURRENT EPISODE, MODERATE (H): Primary | ICD-10-CM

## 2017-05-02 DIAGNOSIS — F43.10 POSTTRAUMATIC STRESS DISORDER: Primary | ICD-10-CM

## 2017-05-02 DIAGNOSIS — F43.12 NIGHTMARES ASSOCIATED WITH CHRONIC POST-TRAUMATIC STRESS DISORDER: ICD-10-CM

## 2017-05-02 DIAGNOSIS — F43.10 POSTTRAUMATIC STRESS DISORDER: ICD-10-CM

## 2017-05-02 RX ORDER — VILAZODONE HYDROCHLORIDE 40 MG/1
40 TABLET ORAL DAILY
Qty: 30 TABLET | Refills: 3 | Status: SHIPPED | OUTPATIENT
Start: 2017-05-02 | End: 2017-08-08

## 2017-05-02 RX ORDER — ARIPIPRAZOLE 2 MG/1
1 TABLET ORAL DAILY
Qty: 15 TABLET | Refills: 3 | Status: SHIPPED | OUTPATIENT
Start: 2017-05-02 | End: 2017-08-08

## 2017-05-02 RX ORDER — PRAZOSIN HYDROCHLORIDE 5 MG/1
15 CAPSULE ORAL AT BEDTIME
Qty: 90 CAPSULE | Refills: 3 | Status: SHIPPED | OUTPATIENT
Start: 2017-05-02 | End: 2017-08-08
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
## 2017-05-02 NOTE — MR AVS SNAPSHOT
After Visit Summary   5/2/2017    Elizabeth Palma    MRN: [id number]           Patient Information     Date Of Birth          1955        Visit Information        Provider Department      5/2/2017 10:30 AM Era Gonzalez, PhD  Psychiatry Clinic        Today's Diagnoses     Major depressive disorder, recurrent episode, moderate (H)    -  1    Generalized anxiety disorder        Posttraumatic stress disorder           Follow-ups after your visit        Your next 10 appointments already scheduled     May 12, 2017 10:00 AM CDT   Return Visit with Javier Tuttle DPM   [facility]    [address] [place] [address]   [phone]            May 15, 2017  2:30 PM CDT   (Arrive by 2:15 PM)   RETURN ENDOCRINE with Lizbet Byers MD   [facility] Endocrinology ([facility])    [address] [place] [address]   [phone]            May 19, 2017  9:30 AM CDT   NUTRITION VISIT with Francesca Danielle RD   [facility] Surgical Weight Management ([facility])    [address] [place] [address]   [phone]            May 22, 2017 10:45 AM CDT   (Arrive by 10:30 AM)   Return Visit with Prakash Irene MD   [facility] Medical Weight Management ([facility])    [address] [place] [address]   [phone]            May 26, 2017 10:00 AM CDT   DBT Individual Therapy with Era Gonzalez, PhD    Psychiatry Clinic ([facility])    [address]  [address] [facility] [address]   [phone]            Jun 06, 2017  9:15 AM CDT   (Arrive by 9:00 AM)   Transplant Skin Check with Lonny Mcduffie MD   [facility] Dermatology ([facility])    [address] [place] [address]  [address]   [phone]            Jun 06, 2017  1:00 PM CDT   Adult Med Follow UP with Chaparrita Hatch MD   [facility] Psychiatry ([facility])    [address] [facility] [address]  [facility] [address]   [phone]            Jun 16, 2017  9:30 AM CDT   NUTRITION VISIT with Francesca Danielle RD   [facility] Surgical Weight Management ([facility] [place])    [address] [phone]            Jul 14, 2017  9:30 AM CDT   NUTRITION VISIT with Francesca Danielle RD   [facility] Surgical Weight Management ([facility])    [address] [place] [phone] [phone]            Aug 18, 2017  9:15 AM CDT   (Arrive by 9:00 AM)   Return Visit with Prakash Irene MD   [facility] Medical Weight Management ([facility])    [address] [place] [phone]              Who to contact     Please call your clinic at [phone] to:    Ask questions about your health    Make or cancel appointments    Discuss your medicines    Learn about your test results    Speak to your doctor   If you have compliments or concerns about an experience at your clinic, or if you wish to file a complaint, please contact [facility] Patient Relations at [phone] or email us at [email or web address]         Additional Information About Your Visit        Jingle [employer]hart Information     Hydro-Run gives you secure access to your electronic health record. If you see a primary care provider, you can also send messages to your care team and make appointments. If you have questions, please call your primary care clinic.  If you do not have a primary care provider, please call [phone] and they will assist you.      Hydro-Run is an electronic gateway that provides easy, online access to your medical records.  With [employer], you can request a clinic appointment, read your test results, renew a prescription or communicate with your care team.     To access your existing account, please contact your [facility] or call [phone] for assistance.        Care EveryWhere ID     This is your Care EveryWhere ID. This could be used by other organizations to access your [place] medical records  [id number]         Blood Pressure from Last 3 Encounters:   05/02/17 129/76   04/14/17 108/75   04/04/17 122/85    Weight from Last 3 Encounters:   05/02/17 79.4 kg (175 lb)   04/14/17 79.4 kg (175 lb)   04/04/17 80.4 kg (177 lb 3.2 oz)              Today, you had the following     No orders found for display         Today's Medication Changes          These changes are accurate as of: 5/2/17 11:59 PM.  If you have any questions, ask your nurse or doctor.               These medicines have changed or have updated prescriptions.        Dose/Directions    econazole nitrate 1 % cream   This may have changed:    - when to take this  - reasons to take this   Used for:  Type II or unspecified type diabetes mellitus with neurological manifestations, not stated as uncontrolled, Dermatophytosis of foot        Apply topically daily   Quantity:  85 g   Refills:  3            Where to get your medicines      These medications were sent to [id number] - [place] - [address] [facility] [address]  [address], [place] [address]    Hours:  24-hours Phone:  [phone]     ARIPiprazole 2 MG tablet    prazosin 5 MG capsule    vilazodone 40 MG Tabs tablet                Primary Care Provider Office Phone # Fax #    Horacio Sheridan [phone]        PHYSICIANS [address] [facility] [address]  [facility] [address]        Thank you!     Thank you for choosing PSYCHIATRY CLINIC  for your care. Our goal is always to provide you with excellent care. Hearing back from our patients is one  way we can continue to improve our services. Please take a few minutes to complete the written survey that you may receive in the mail after your visit with us. Thank you!             Your Updated Medication List - Protect others around you: Learn how to safely use, store and throw away your medicines at www.disposemymeds.org.          This list is accurate as of: 5/2/17 11:59 PM.  Always use your most recent med list.                   Brand Name Dispense Instructions for use    acetylcysteine 600 MG Caps capsule    N-ACETYL CYSTEINE    30 capsule    Take 2 400 mg caps two times daily for total daily dose of 800 mg       albuterol 108 (90 BASE) MCG/ACT Inhaler    PROAIR HFA/PROVENTIL HFA/VENTOLIN HFA    1 Inhaler    Inhale 2 puffs into the lungs every 6 hours as needed for shortness of breath / dyspnea or wheezing       ARIPiprazole 2 MG tablet    ABILIFY    15 tablet    Take 0.5 tablets (1 mg) by mouth daily       aspirin 81 MG EC tablet     30 tablet    Take 1 tablet (81 mg) by mouth daily       BENADRYL 25 MG capsule   Generic drug:  diphenhydrAMINE      Take 25 mg by mouth At Bedtime.       blood glucose monitoring lancets     1 Box    Use to test blood sugar 2 times daily or as directed.       blood glucose monitoring meter device kit    no brand specified    1 kit    Use to test blood sugar 2 times daily or as directed.       blood glucose monitoring test strip    no brand specified    100 strip    Use to test blood sugars 2 times daily or as directed       cyanocobalamin 1000 MCG tablet    vitamin  B-12    30 tablet    Take 1 tablet by mouth daily.       cycloSPORINE modified 25 MG capsule    GENERIC EQUIVALENT    240 capsule    Take 4 capsules (100 mg) by mouth 2 times daily       econazole nitrate 1 % cream     85 g    Apply topically daily       furosemide 20 MG tablet    LASIX    90 tablet    Take 1 tablet (20 mg) by mouth daily       * gabapentin 300 MG capsule    NEURONTIN    90 capsule    Take 1  capsule (300 mg) by mouth At Bedtime       * gabapentin 600 MG tablet    NEURONTIN    90 tablet    Take 0.5 tablets (300 mg) by mouth daily At evening       latanoprost 0.005 % ophthalmic solution    XALATAN    2.5 mL    Place 1 drop into both eyes At Bedtime       metFORMIN 500 MG 24 hr tablet    GLUCOPHAGE-XR    90 tablet    Take 3 tablets (1,500 mg) by mouth daily (with dinner)       mycophenolate 250 MG capsule    CELLCEPT - GENERIC EQUIVALENT    240 capsule    Take 4 capsules (1,000 mg) by mouth 2 times daily       omeprazole 40 MG capsule    priLOSEC    90 capsule    Take 1 capsule (40 mg) by mouth daily       ondansetron 4 MG ODT tab    ZOFRAN-ODT    20 tablet    Take 1 tablet (4 mg) by mouth every 6 hours as needed       prazosin 5 MG capsule    MINIPRESS    90 capsule    Take 3 capsules (15 mg) by mouth At Bedtime       REFRESH OP      Apply to eye as needed Both eyes       simvastatin 20 MG tablet    ZOCOR    90 tablet    Take 1 tablet (20 mg) by mouth At Bedtime       sulfamethoxazole-trimethoprim 400-80 MG per tablet    BACTRIM/SEPTRA    90 tablet    Take 1 tablet by mouth daily       topiramate 200 MG tablet    TOPAMAX    60 tablet    Take 1 tablet (200 mg) by mouth 2 times daily       TYLENOL 325 MG tablet   Generic drug:  acetaminophen      Take 325-650 mg by mouth as needed       vilazodone 40 MG Tabs tablet    VIIBRYD    30 tablet    Take 1 tablet (40 mg) by mouth daily       vitamin D 1000 UNITS capsule     30 capsule    3 tabs (3000 units) daily       * Notice:  This list has 2 medication(s) that are the same as other medications prescribed for you. Read the directions carefully, and ask your doctor or other care provider to review them with you.

## 2017-05-02 NOTE — MR AVS SNAPSHOT
After Visit Summary   5/2/2017    Elizabeth Palma    MRN: [id number]           Patient Information     Date Of Birth          1955        Visit Information        Provider Department      5/2/2017 1:00 PM Chaparrita Hatch MD [facility] Psychiatry        Today's Diagnoses     Nightmares associated with chronic post-traumatic stress disorder        Major depressive disorder, recurrent episode, moderate (H)           Follow-ups after your visit        Follow-up notes from your care team     Return in about 4 weeks (around 5/30/2017) for 30 min. MFU.      Your next 10 appointments already scheduled     May 05, 2017 10:00 AM CDT   DBT Individual Therapy with Era Gonzalez, PhD    Psychiatry Clinic ([facility])    [place] [id number]  [address] [place] [phone]            May 12, 2017 10:00 AM CDT   Return Visit with Javier Tuttle DPM   [facility] ([facility])    [address] [place] [phone]            May 15, 2017  2:30 PM CDT   (Arrive by 2:15 PM)   RETURN ENDOCRINE with Lizbet Byers MD   [facility] Endocrinology ([facility])    [address] [place] [phone]            May 19, 2017  9:30 AM CDT   NUTRITION VISIT with Francesca Danielle RD   [facility] Surgical Weight Management ([facility] [place])    [address] [place] [phone]            May 22, 2017 10:45 AM CDT   (Arrive by 10:30 AM)   Return Visit with Prakash Irene MD   [facility] Medical Weight Management ([facility] [place])    [address] [place] [phone]            Jun 06, 2017  9:15 AM CDT   (Arrive by 9:00 AM)   Transplant Skin Check with Lonny Mcduffie MD   [facility] Dermatology  ([facility])    [address] [facility]  [address]rd [facility] [address]   [phone]            Jun 06, 2017  1:00 PM CDT   Adult Med Follow UP with Chaparrita Hatch MD   [facility] Psychiatry ([facility])    [address]  [facility] [address]   [phone]            Jun 16, 2017  9:30 AM CDT   NUTRITION VISIT with Francesca Danielle RD   [facility] Surgical Weight Management ([facility])    [address] [facility]  4th [facility] [phone] [phone]            Jul 14, 2017  9:30 AM CDT   NUTRITION VISIT with Francesca Danielle RD   [facility] Surgical Weight Management ([facility])    [address] [facility]  [address] [facility] [address] [phone]            Aug 18, 2017  9:15 AM CDT   (Arrive by 9:00 AM)   Return Visit with Prakash Irene MD   [facility] Medical Weight Management ([facility])    [address] [place] [address]  [address] [facility] [phone] [phone]              Who to contact     Please call your clinic at [phone] to:    Ask questions about your health    Make or cancel appointments    Discuss your medicines    Learn about your test results    Speak to your doctor   If you have compliments or concerns about an experience at your clinic, or if you wish to file a complaint, please contact [facility] Physicians Patient Relations at [phone] or email us at [email or web address]         Additional Information About Your Visit        MyChart Information     F[employer]ot gives you secure access to your electronic health record. If you see a primary care provider, you can also send messages to your care team and make appointments. If you have questions, please call your primary care clinic.  If you do not have a primary care provider, please call [phone] and they will assist you.       "TeachTown is an electronic gateway that provides easy, online access to your medical records. With [employer], you can request a clinic appointment, read your test results, renew a prescription or communicate with your care team.     To access your existing account, please contact your [facility] or call [phone] for assistance.        Care EveryWhere ID     This is your Care EveryWhere ID. This could be used by other organizations to access your [place] medical records  [id number]        Your Vitals Were     Pulse Height Breastfeeding? BMI (Body Mass Index)          71 5' 1\" (154.9 cm) No 33.07 kg/m2         Blood Pressure from Last 3 Encounters:   05/02/17 129/76   04/14/17 108/75   03/20/17 117/80    Weight from Last 3 Encounters:   05/02/17 175 lb (79.4 kg)   04/14/17 175 lb (79.4 kg)   03/20/17 178 lb 3.2 oz (80.8 kg)              Today, you had the following     No orders found for display         Today's Medication Changes          These changes are accurate as of: 5/2/17  1:40 PM.  If you have any questions, ask your nurse or doctor.               These medicines have changed or have updated prescriptions.        Dose/Directions    econazole nitrate 1 % cream   This may have changed:    - when to take this  - reasons to take this   Used for:  Type II or unspecified type diabetes mellitus with neurological manifestations, not stated as uncontrolled, Dermatophytosis of foot        Apply topically daily   Quantity:  85 g   Refills:  3            Where to get your medicines      These medications were sent to [facility] - [place], [place] - [address] [facility] [address]  [address], [place] [address]    Hours:  24-hours Phone:  [phone]     ARIPiprazole 2 MG tablet    prazosin 5 MG capsule    vilazodone 40 MG Tabs tablet                Primary Care Provider Office Phone # Fax #    Horacio Sheridan [phone]        PHYSICIANS [address] [facility] [address]  [facility] [address]        Thank you!     Thank you for choosing [facility] PSYCHIATRY  for your care. Our goal is always to provide you with excellent care. Hearing back from our patients is one way we can continue to improve our services. Please take a few minutes to complete the written survey that you may receive in the mail after your visit with us. Thank you!             Your Updated Medication List - Protect others around you: Learn how to safely use, store and throw away your medicines at www.disposemymeds.org.          This list is accurate as of: 5/2/17  1:40 PM.  Always use your most recent med list.                   Brand Name Dispense Instructions for use    acetylcysteine 600 MG Caps capsule    N-ACETYL CYSTEINE    30 capsule    Take 2 400 mg caps two times daily for total daily dose of 800 mg       albuterol 108 (90 BASE) MCG/ACT Inhaler    PROAIR HFA/PROVENTIL HFA/VENTOLIN HFA    1 Inhaler    Inhale 2 puffs into the lungs every 6 hours as needed for shortness of breath / dyspnea or wheezing       ARIPiprazole 2 MG tablet    ABILIFY    15 tablet    Take 0.5 tablets (1 mg) by mouth daily       aspirin 81 MG EC tablet     30 tablet    Take 1 tablet (81 mg) by mouth daily       BENADRYL 25 MG capsule   Generic drug:  diphenhydrAMINE      Take 25 mg by mouth At Bedtime.       blood glucose monitoring lancets     1 Box    Use to test blood sugar 2 times daily or as directed.       blood glucose monitoring meter device kit    no brand specified    1 kit    Use to test blood sugar 2 times daily or as directed.       blood glucose monitoring test strip    no brand specified    100 strip    Use to test blood sugars 2 times daily or as directed       cyanocobalamin 1000 MCG tablet    vitamin  B-12    30 tablet    Take 1 tablet by mouth daily.       cycloSPORINE modified 25 MG capsule    GENERIC EQUIVALENT    240 capsule    Take 4 capsules (100 mg) by mouth 2 times daily       econazole nitrate 1  % cream     85 g    Apply topically daily       furosemide 20 MG tablet    LASIX    90 tablet    Take 1 tablet (20 mg) by mouth daily       * gabapentin 300 MG capsule    NEURONTIN    90 capsule    Take 1 capsule (300 mg) by mouth At Bedtime       * gabapentin 600 MG tablet    NEURONTIN    90 tablet    Take 0.5 tablets (300 mg) by mouth daily At evening       latanoprost 0.005 % ophthalmic solution    XALATAN    2.5 mL    Place 1 drop into both eyes At Bedtime       metFORMIN 500 MG 24 hr tablet    GLUCOPHAGE-XR    90 tablet    Take 3 tablets (1,500 mg) by mouth daily (with dinner)       mycophenolate 250 MG capsule    CELLCEPT - GENERIC EQUIVALENT    240 capsule    Take 4 capsules (1,000 mg) by mouth 2 times daily       omeprazole 40 MG capsule    priLOSEC    90 capsule    Take 1 capsule (40 mg) by mouth daily       ondansetron 4 MG ODT tab    ZOFRAN-ODT    20 tablet    Take 1 tablet (4 mg) by mouth every 6 hours as needed       prazosin 5 MG capsule    MINIPRESS    90 capsule    Take 3 capsules (15 mg) by mouth At Bedtime       REFRESH OP      Apply to eye as needed Both eyes       simvastatin 20 MG tablet    ZOCOR    90 tablet    Take 1 tablet (20 mg) by mouth At Bedtime       sulfamethoxazole-trimethoprim 400-80 MG per tablet    BACTRIM/SEPTRA    90 tablet    Take 1 tablet by mouth daily       topiramate 200 MG tablet    TOPAMAX    60 tablet    Take 1 tablet (200 mg) by mouth 2 times daily       TYLENOL 325 MG tablet   Generic drug:  acetaminophen      Take 325-650 mg by mouth as needed       vilazodone 40 MG Tabs tablet    VIIBRYD    30 tablet    Take 1 tablet (40 mg) by mouth daily       vitamin D 1000 UNITS capsule     30 capsule    3 tabs (3000 units) daily       * Notice:  This list has 2 medication(s) that are the same as other medications prescribed for you. Read the directions carefully, and ask your doctor or other care provider to review them with you.

## 2017-05-02 NOTE — PROGRESS NOTES
Group Therapy N = 6 present; 80 min  Diagnoses: Major Depression (F33.0), Generalized Anxiety (F41.1)   Co-Facilitators: Murphy Gonzalez and Megan Gomez(absent)     Other Topics Discussed:   1. Coping with stressful situations.    One group member discussed her ongoing stressors including her declining physical health, financial struggles, and caring for her daughter.  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 This group member shared how she is planning ahead and preparing for the meeting as well as engaging in self-care.   Another group member also reported that she feels that whenever things are going well then something bad happens.   The group member acknowledged that the stress inadvertently may have motivated her to complete some tasks are on her to-do list.  Elizabeth listened and validated group members' stressful situations.     2. Fostering independence in children  One group member discussed how she is trying to motivate her daughter to become more independent.  She states that she is her daughter's legal guardian due to a disability but doesn't want to be and is try to discover ways to teach her daughter skills to live independently and to care for herself emotionally and financially.  Another group member discussed how she did not feel her adult daughter needs to leave her home but is still trying to encourage her daughter to graduate from high school, develop age-appropriate relationships and friendships, assist around the house, and gain some financial independence.  Another group member discussed how her son started collecting dishes and pots and pans as a teenager in order to start planning to move out from her home.  Elizabeth discussed how she required her son to be attending school or working in order to move back home with her.     3. Existentialism  "and loneliness  One group member discussed her acceptance with her diagnosis of Multiple Sclerosis and how it makes her feel lonely at times and how sometimes she doesn't want to \"fix\" her health ailments.   Another group member stated how difficult it is for her to deal with her health concerns but makes it a priority.  Elizabeth discussed how she feels lonely despite being  and having friends and hopes to reconnect with an old childhood friend.          Assessment: Elizabeth arrived on time to the group.  She seemed to listen to other group members attentively and validated other group members concerns.  She seems motivated to attend group and even discussed how she rearranged an appointment to attend today.  She states that despite being very socially connected and  that she lacks close, fulfilling relationships with friends.  She is hoping to reconnect with a childhood friend.       Plan: Continue weekly group therapy to work on goals. See treatment plan.    I was present for and actively participated in the entire group therapy session, my observations of Elizabeth Palma s progress and participation are that she is new in the group and relying on group leaders to encourage her to give feedback. She seems to relate well with the other women and their issues. .    Era Gonzalez, PhD.  "

## 2017-05-04 ENCOUNTER — TELEPHONE (OUTPATIENT)
Dept: PSYCHIATRY | Facility: CLINIC | Age: 62
End: 2017-05-04

## 2017-05-04 ASSESSMENT — PATIENT HEALTH QUESTIONNAIRE - PHQ9: SUM OF ALL RESPONSES TO PHQ QUESTIONS 1-9: 13

## 2017-05-04 NOTE — TELEPHONE ENCOUNTER
Prior Authorization Retail Medication Request  Medication/Dose: ABILIFY 2 MG tablet  Diagnosis and ICD code: Major depressive disorder, recurrent episode, moderate (H) [F33.1]   New/Renewal/Insurance Change PA: New  Previously Tried and Failed Therapies: see chart    Insurance ID (if provided):   Insurance Phone (if provided):     Any additional info from fax request:     If you received a fax notification from an outside Pharmacy:  Pharmacy Name:[facility]  Pharmacy #: [phone]  Pharmacy Fax: [phone]

## 2017-05-04 NOTE — TELEPHONE ENCOUNTER
Prior Authorization Retail Medication Request  Medication/Dose: prazosin (MINIPRESS) 5 MG capsule  Diagnosis and ICD code: Nightmares associated with chronic post-traumatic stress disorder [F51.5, F43.10]   New/Renewal/Insurance Change PA: New  Previously Tried and Failed Therapies: See chart    Insurance ID (if provided):   Insurance Phone (if provided):     Any additional info from fax request:     If you received a fax notification from an outside Pharmacy:  Pharmacy Name:[facility]  Pharmacy #:[phone]  Pharmacy Fax:[phone]

## 2017-05-04 NOTE — TELEPHONE ENCOUNTER
Prior Authorization Retail Medication Request  Medication/Dose: vilazodone (VIIBRYD) 40 MG TABS tablet  Diagnosis and ICD code: Major depressive disorder, recurrent episode, moderate (H) [F33.1]  New/Renewal/Insurance Change PA: New  Previously Tried and Failed Therapies: see chart    Insurance ID (if provided):   Insurance Phone (if provided):     Any additional info from fax request:     If you received a fax notification from an outside Pharmacy:  Pharmacy Name:[facility]   Pharmacy #:[phone]  Pharmacy Fax:[phone]

## 2017-05-05 ENCOUNTER — OFFICE VISIT (OUTPATIENT)
Dept: PSYCHIATRY | Facility: CLINIC | Age: 62
End: 2017-05-05
Attending: PSYCHOLOGIST
Payer: MEDICARE

## 2017-05-05 DIAGNOSIS — F41.1 GENERALIZED ANXIETY DISORDER: ICD-10-CM

## 2017-05-05 DIAGNOSIS — F33.1 MAJOR DEPRESSIVE DISORDER, RECURRENT EPISODE, MODERATE (H): Primary | ICD-10-CM

## 2017-05-05 DIAGNOSIS — F43.10 POSTTRAUMATIC STRESS DISORDER: ICD-10-CM

## 2017-05-05 NOTE — TELEPHONE ENCOUNTER
[facility] Prior Authorization Team   Phone: [phone]  Fax: [phone]    PA Initiation    Medication: prazosin (MINIPRESS) 5 MG capsule  Insurance Company: [employer] - Phone [phone] Fax [phone]  Pharmacy Filling the Rx: [facility] [address] [place] [address], [place] - [address] [facility] [address]  Filling Pharmacy Phone: [phone]  Filling Pharmacy Fax:    Start Date: 5/5/2017

## 2017-05-05 NOTE — TELEPHONE ENCOUNTER
Received phone call from Stephane stating that medication does not need a PA and that the pharmacy already got a paid claim. Called pharmacy and rejection was a refill too soon.

## 2017-05-05 NOTE — TELEPHONE ENCOUNTER
[facility] Prior Authorization Team   Phone: [phone]  Fax: [phone]    PA Initiation    Medication: vilazodone (VIIBRYD) 40 MG TABS tablet  Insurance Company: "[employer] - Phone [phone] Fax [phone]  Pharmacy Filling the Rx: [facility] [address] [place] [address], [place] - [facility] [address] [facility] [address]  Filling Pharmacy Phone: [phone]  Filling Pharmacy Fax:    Start Date: 5/5/2017

## 2017-05-05 NOTE — MR AVS SNAPSHOT
After Visit Summary   5/5/2017    Elizabeth Palma    MRN: [id number]           Patient Information     Date Of Birth          1955        Visit Information        Provider Department      5/5/2017 10:00 AM Era Gonzalez, PhD  Psychiatry Clinic        Today's Diagnoses     Major depressive disorder, recurrent episode, moderate (H)    -  1    Posttraumatic stress disorder        Generalized anxiety disorder           Follow-ups after your visit        Your next 10 appointments already scheduled     May 22, 2017  9:30 AM CDT   DX VERTEBRAL FRACTURE ANALYSIS LAT ONLY with UCDX1   [facility] Imaging Center Dexa ([facility])    [address]  1st Floor  [facility] [address] [phone]           Please do not take any of the following 24 hours prior to the day of your exam: vitamins, calcium tablets, antacids.            May 22, 2017 10:45 AM CDT   (Arrive by 10:30 AM)   Return Visit with Prakash Irene MD   [facility] Medical Weight Management ([facility])    [address]  4th Floor  [facility] [address]   [phone]            May 26, 2017 10:00 AM CDT   DBT Individual Therapy with Era Gonzalez, PhD    Psychiatry Clinic ([facility])    [place] [id number]  [address] [facility] [address] [phone]            Jun 06, 2017  9:15 AM CDT   (Arrive by 9:00 AM)   Transplant Skin Check with Lonny Mcduffie MD   [facility] Dermatology ([facility])    [address]  3rd Floor  [facility] [address]   [phone]            Jun 06, 2017  1:00 PM CDT   Adult Med Follow UP with Chaparrita Hatch MD   [facility] Psychiatry ([facility])    [address]  [facility] [address]   [phone]            Jun 16, 2017  9:30 AM CDT   NUTRITION VISIT with Francesca Danielle RD   [facility] Surgical  Weight Management ([facility] [place])    [address] [place] [address]   [phone]            Jul 14, 2017  9:30 AM CDT   NUTRITION VISIT with Francesca Danielle RD   [facility] Surgical Weight Management ([facility] [place])    [address] [place] [address]   [phone]            Aug 18, 2017  9:15 AM CDT   (Arrive by 9:00 AM)   Return Visit with Prakash Irene MD   [facility] Medical Weight Management ([facility] [place])    [address] [place] [address]   [phone]            Aug 21, 2017 10:00 AM CDT   Return Visit with Javier Tuttle DPM   [facility] ([facility])    [address] [place] [address] [phone]            Aug 25, 2017  9:30 AM CDT   NUTRITION VISIT with Francesca Danielle RD   [facility] Surgical Weight Management ([facility])    [address] [place] [address] [phone]              Who to contact     Please call your clinic at [phone] to:    Ask questions about your health    Make or cancel appointments    Discuss your medicines    Learn about your test results    Speak to your doctor   If you have compliments or concerns about an experience at your clinic, or if you wish to file a complaint, please contact [facility] Patient Relations at [phone] or email us at [email or web address]         Additional Information About Your Visit        MyChart Information     [facility] [employer] gives you secure access to your electronic health record. If you see a primary care provider, you can also send messages to your care team and make appointments. If you have questions, please call your primary care clinic.  If you do not have a primary care provider, please call [phone] and they will assist  you.      [employer] is an electronic gateway that provides easy, online access to your medical records. With [employer], you can request a clinic appointment, read your test results, renew a prescription or communicate with your care team.     To access your existing account, please contact your [facility] or call [phone] for assistance.        Care EveryWhere ID     This is your Care EveryWhere ID. This could be used by other organizations to access your [place] medical records  [id number]         Blood Pressure from Last 3 Encounters:   05/15/17 118/77   05/12/17 105/73   05/02/17 129/76    Weight from Last 3 Encounters:   05/15/17 79.7 kg (175 lb 12.8 oz)   05/02/17 79.4 kg (175 lb)   04/14/17 79.4 kg (175 lb)              Today, you had the following     No orders found for display         Today's Medication Changes          These changes are accurate as of: 5/5/17 11:59 PM.  If you have any questions, ask your nurse or doctor.               These medicines have changed or have updated prescriptions.        Dose/Directions    econazole nitrate 1 % cream   This may have changed:    - when to take this  - reasons to take this   Used for:  Type II or unspecified type diabetes mellitus with neurological manifestations, not stated as uncontrolled, Dermatophytosis of foot        Apply topically daily   Quantity:  85 g   Refills:  3                Primary Care Provider Office Phone # Fax #    Horacio Sheridan [phone]        PHYSICIANS [address] [facility] [address]  [facility] [address]        Thank you!     Thank you for choosing PSYCHIATRY CLINIC  for your care. Our goal is always to provide you with excellent care. Hearing back from our patients is one way we can continue to improve our services. Please take a few minutes to complete the written survey that you may receive in the mail after your visit with us. Thank you!             Your Updated Medication List -  Protect others around you: Learn how to safely use, store and throw away your medicines at www.disposemymeds.org.          This list is accurate as of: 5/5/17 11:59 PM.  Always use your most recent med list.                   Brand Name Dispense Instructions for use    acetylcysteine 600 MG Caps capsule    N-ACETYL CYSTEINE    30 capsule    Take 2 400 mg caps two times daily for total daily dose of 800 mg       albuterol 108 (90 BASE) MCG/ACT Inhaler    PROAIR HFA/PROVENTIL HFA/VENTOLIN HFA    1 Inhaler    Inhale 2 puffs into the lungs every 6 hours as needed for shortness of breath / dyspnea or wheezing       ARIPiprazole 2 MG tablet    ABILIFY    15 tablet    Take 0.5 tablets (1 mg) by mouth daily       aspirin 81 MG EC tablet     30 tablet    Take 1 tablet (81 mg) by mouth daily       BENADRYL 25 MG capsule   Generic drug:  diphenhydrAMINE      Take 25 mg by mouth At Bedtime.       blood glucose monitoring lancets     1 Box    Use to test blood sugar 2 times daily or as directed.       blood glucose monitoring meter device kit    no brand specified    1 kit    Use to test blood sugar 2 times daily or as directed.       blood glucose monitoring test strip    no brand specified    100 strip    Use to test blood sugars 2 times daily or as directed       cyanocobalamin 1000 MCG tablet    vitamin  B-12    30 tablet    Take 1 tablet by mouth daily.       cycloSPORINE modified 25 MG capsule    GENERIC EQUIVALENT    240 capsule    Take 4 capsules (100 mg) by mouth 2 times daily       econazole nitrate 1 % cream     85 g    Apply topically daily       furosemide 20 MG tablet    LASIX    90 tablet    Take 1 tablet (20 mg) by mouth daily       * gabapentin 300 MG capsule    NEURONTIN    90 capsule    Take 1 capsule (300 mg) by mouth At Bedtime       * gabapentin 600 MG tablet    NEURONTIN    90 tablet    Take 0.5 tablets (300 mg) by mouth daily At evening       latanoprost 0.005 % ophthalmic solution    XALATAN    2.5 mL     Place 1 drop into both eyes At Bedtime       metFORMIN 500 MG 24 hr tablet    GLUCOPHAGE-XR    90 tablet    Take 3 tablets (1,500 mg) by mouth daily (with dinner)       mycophenolate 250 MG capsule    CELLCEPT - GENERIC EQUIVALENT    240 capsule    Take 4 capsules (1,000 mg) by mouth 2 times daily       omeprazole 40 MG capsule    priLOSEC    90 capsule    Take 1 capsule (40 mg) by mouth daily       ondansetron 4 MG ODT tab    ZOFRAN-ODT    20 tablet    Take 1 tablet (4 mg) by mouth every 6 hours as needed       prazosin 5 MG capsule    MINIPRESS    90 capsule    Take 3 capsules (15 mg) by mouth At Bedtime       REFRESH OP      Apply to eye as needed Both eyes       simvastatin 20 MG tablet    ZOCOR    90 tablet    Take 1 tablet (20 mg) by mouth At Bedtime       sulfamethoxazole-trimethoprim 400-80 MG per tablet    BACTRIM/SEPTRA    90 tablet    Take 1 tablet by mouth daily       topiramate 200 MG tablet    TOPAMAX    60 tablet    Take 1 tablet (200 mg) by mouth 2 times daily       TYLENOL 325 MG tablet   Generic drug:  acetaminophen      Take 325-650 mg by mouth as needed       vilazodone 40 MG Tabs tablet    VIIBRYD    30 tablet    Take 1 tablet (40 mg) by mouth daily       vitamin D 1000 UNITS capsule     30 capsule    3 tabs (3000 units) daily       * Notice:  This list has 2 medication(s) that are the same as other medications prescribed for you. Read the directions carefully, and ask your doctor or other care provider to review them with you.

## 2017-05-05 NOTE — TELEPHONE ENCOUNTER
[facility] Prior Authorization Team   Phone: [phone]  Fax: [phone]    PA Initiation    Medication: ABILIFY 2 MG tablet  Insurance Company: [employer] - Phone [phone] Fax [phone]  Pharmacy Filling the Rx: [facility] [facility] [address] [address]", [place] - [address] [facility] [address]  Filling Pharmacy Phone: [phone]  Filling Pharmacy Fax:    Start Date: 5/5/2017

## 2017-05-05 NOTE — TELEPHONE ENCOUNTER
Received a phone call from [employer] verifying pt's diagnosis and date pt started medication. Relayed all information. We should receive determination via fax.

## 2017-05-09 ENCOUNTER — OFFICE VISIT (OUTPATIENT)
Dept: PSYCHIATRY | Facility: CLINIC | Age: 62
End: 2017-05-09
Attending: PSYCHOLOGIST
Payer: MEDICARE

## 2017-05-09 DIAGNOSIS — F33.1 MAJOR DEPRESSIVE DISORDER, RECURRENT EPISODE, MODERATE (H): Primary | ICD-10-CM

## 2017-05-09 DIAGNOSIS — F43.10 POSTTRAUMATIC STRESS DISORDER: ICD-10-CM

## 2017-05-09 DIAGNOSIS — F41.1 GENERALIZED ANXIETY DISORDER: ICD-10-CM

## 2017-05-09 NOTE — MR AVS SNAPSHOT
After Visit Summary   5/9/2017    Elizabeth Palma    MRN: [id number]           Patient Information     Date Of Birth          1955        Visit Information        Provider Department      5/9/2017 10:30 AM Era Gonzalez, PhD LP Psychiatry Clinic        Today's Diagnoses     Major depressive disorder, recurrent episode, moderate (H)    -  1    Posttraumatic stress disorder        Generalized anxiety disorder           Follow-ups after your visit        Your next 10 appointments already scheduled     May 19, 2017  9:00 AM CDT   LAB with  LAB   [facility] Lab ([facility])    [address] [place] [id number] [phone]           Patient must bring picture ID.  Patient should be prepared to give a urine specimen  Please do not eat 10-12 hours before your appointment if you are coming in fasting for labs on lipids, cholesterol, or glucose (sugar).  Pregnant women should follow their Care Team instructions. Water with medications is okay. Do not drink coffee or other fluids.   If you have concerns about taking  your medications, please ask at office or if scheduling via [employer] [employer], send a message by clicking on Secure Messaging, Message Your Care Team.            May 19, 2017  9:30 AM CDT   NUTRITION VISIT with Francesca Danielle RD   [facility] Surgical Weight Management ([facility])    [address] [place] [address] [phone]            May 22, 2017  9:30 AM CDT   DX VERTEBRAL FRACTURE ANALYSIS LAT ONLY with DX1   [facility] Dexa ([facility])    [address] [place] [id number] [phone]           Please do not take any of the following 24 hours prior to the day of your exam: vitamins, calcium tablets, antacids.            May 22, 2017 10:45 AM CDT   (Arrive by 10:30 AM)   Return Visit with Prakash Arizmendi  MD Maria Victoria   [facility] Medical Weight Management ([facility] [place])    [address] [facility] [address]  [address]th [facility] [address] [phone]            May 26, 2017 10:00 AM CDT   DBT Individual Therapy with Era Gonzalez, PhD    Psychiatry Clinic ([facility])    [address]  [address] [facility] [address] [phone]            Jun 06, 2017  9:15 AM CDT   (Arrive by 9:00 AM)   Transplant Skin Check with Lonny Mcduffie MD   [facility] Dermatology ([facility] [place])    [address] [facility] [address] [facility] [address]  3rd [facility] [address] [phone]            Jun 06, 2017  1:00 PM CDT   Adult Med Follow UP with Chaparrita Hatch MD   [facility]    [address]  [facility] [address]   [phone]            Jun 16, 2017  9:30 AM CDT   NUTRITION VISIT with Francesca Danielle RD   [facility] Surgical Weight Management ([facility] [place])    [address] [address]   [phone]            Jul 14, 2017  9:30 AM CDT   NUTRITION VISIT with Francesca Danielle RD   [facility] Weight Management ([facility] [place])    [address] [address]   [phone]            Aug 18, 2017  9:15 AM CDT   (Arrive by 9:00 AM)   Return Visit with Prakash Irene MD   [facility] Medical Weight Management ([facility] [place])    [address] [facility] [address]  [address] [facility] [address] [phone]              Who to contact     Please call your clinic at [phone] to:    Ask questions about your health    Make or cancel appointments    Discuss your medicines    Learn about your test results    Speak to your doctor   If you have compliments or concerns about an experience at your clinic, or if you wish to file a complaint, please  contact [facility] Patient Relations at [phone] or email us at [email or web address]         Additional Information About Your Visit        [employer] Information     [employer] gives you secure access to your electronic health record. If you see a primary care provider, you can also send messages to your care team and make appointments. If you have questions, please call your primary care clinic.  If you do not have a primary care provider, please call [phone] and they will assist you.      [email or web address] is an electronic gateway that provides easy, online access to your medical records. With [email or web address], you can request a clinic appointment, read your test results, renew a prescription or communicate with your care team.     To access your existing account, please contact your [facility] or call [phone] for assistance.        Care EveryWhere ID     This is your Care EveryWhere ID. This could be used by other organizations to access your [place] medical records  [id number]         Blood Pressure from Last 3 Encounters:   05/15/17 118/77   05/12/17 105/73   05/02/17 129/76    Weight from Last 3 Encounters:   05/15/17 79.7 kg (175 lb 12.8 oz)   05/02/17 79.4 kg (175 lb)   04/14/17 79.4 kg (175 lb)              Today, you had the following     No orders found for display         Today's Medication Changes          These changes are accurate as of: 5/9/17 11:59 PM.  If you have any questions, ask your nurse or doctor.               These medicines have changed or have updated prescriptions.        Dose/Directions    econazole nitrate 1 % cream   This may have changed:    - when to take this  - reasons to take this   Used for:  Type II or unspecified type diabetes mellitus with neurological manifestations, not stated as uncontrolled, Dermatophytosis of foot        Apply topically daily   Quantity:  85 g   Refills:  3                Primary Care  Provider Office Phone # Fax #    Horacio Sheridan [phone]        PHYSICIANS [address] [facility] [address]  [facility] [address]        Thank you!     Thank you for choosing PSYCHIATRY CLINIC  for your care. Our goal is always to provide you with excellent care. Hearing back from our patients is one way we can continue to improve our services. Please take a few minutes to complete the written survey that you may receive in the mail after your visit with us. Thank you!             Your Updated Medication List - Protect others around you: Learn how to safely use, store and throw away your medicines at www.disposemymeds.org.          This list is accurate as of: 5/9/17 11:59 PM.  Always use your most recent med list.                   Brand Name Dispense Instructions for use    acetylcysteine 600 MG Caps capsule    N-ACETYL CYSTEINE    30 capsule    Take 2 400 mg caps two times daily for total daily dose of 800 mg       albuterol 108 (90 BASE) MCG/ACT Inhaler    PROAIR HFA/PROVENTIL HFA/VENTOLIN HFA    1 Inhaler    Inhale 2 puffs into the lungs every 6 hours as needed for shortness of breath / dyspnea or wheezing       ARIPiprazole 2 MG tablet    ABILIFY    15 tablet    Take 0.5 tablets (1 mg) by mouth daily       aspirin 81 MG EC tablet     30 tablet    Take 1 tablet (81 mg) by mouth daily       BENADRYL 25 MG capsule   Generic drug:  diphenhydrAMINE      Take 25 mg by mouth At Bedtime.       blood glucose monitoring lancets     1 Box    Use to test blood sugar 2 times daily or as directed.       blood glucose monitoring meter device kit    no brand specified    1 kit    Use to test blood sugar 2 times daily or as directed.       blood glucose monitoring test strip    no brand specified    100 strip    Use to test blood sugars 2 times daily or as directed       cyanocobalamin 1000 MCG tablet    vitamin  B-12    30 tablet    Take 1 tablet by mouth daily.       cycloSPORINE modified 25 MG capsule    GENERIC  EQUIVALENT    240 capsule    Take 4 capsules (100 mg) by mouth 2 times daily       econazole nitrate 1 % cream     85 g    Apply topically daily       furosemide 20 MG tablet    LASIX    90 tablet    Take 1 tablet (20 mg) by mouth daily       * gabapentin 300 MG capsule    NEURONTIN    90 capsule    Take 1 capsule (300 mg) by mouth At Bedtime       * gabapentin 600 MG tablet    NEURONTIN    90 tablet    Take 0.5 tablets (300 mg) by mouth daily At evening       latanoprost 0.005 % ophthalmic solution    XALATAN    2.5 mL    Place 1 drop into both eyes At Bedtime       metFORMIN 500 MG 24 hr tablet    GLUCOPHAGE-XR    90 tablet    Take 3 tablets (1,500 mg) by mouth daily (with dinner)       mycophenolate 250 MG capsule    CELLCEPT - GENERIC EQUIVALENT    240 capsule    Take 4 capsules (1,000 mg) by mouth 2 times daily       omeprazole 40 MG capsule    priLOSEC    90 capsule    Take 1 capsule (40 mg) by mouth daily       ondansetron 4 MG ODT tab    ZOFRAN-ODT    20 tablet    Take 1 tablet (4 mg) by mouth every 6 hours as needed       prazosin 5 MG capsule    MINIPRESS    90 capsule    Take 3 capsules (15 mg) by mouth At Bedtime       REFRESH OP      Apply to eye as needed Both eyes       simvastatin 20 MG tablet    ZOCOR    90 tablet    Take 1 tablet (20 mg) by mouth At Bedtime       sulfamethoxazole-trimethoprim 400-80 MG per tablet    BACTRIM/SEPTRA    90 tablet    Take 1 tablet by mouth daily       topiramate 200 MG tablet    TOPAMAX    60 tablet    Take 1 tablet (200 mg) by mouth 2 times daily       TYLENOL 325 MG tablet   Generic drug:  acetaminophen      Take 325-650 mg by mouth as needed       vilazodone 40 MG Tabs tablet    VIIBRYD    30 tablet    Take 1 tablet (40 mg) by mouth daily       vitamin D 1000 UNITS capsule     30 capsule    3 tabs (3000 units) daily       * Notice:  This list has 2 medication(s) that are the same as other medications prescribed for you. Read the directions carefully, and ask your  doctor or other care provider to review them with you.
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
## 2017-05-10 NOTE — TELEPHONE ENCOUNTER
Prior Authorization Approval    Authorization Effective Date: 5/5/2017  Authorization Expiration Date: 12/31/2017  Medication: prazosin (MINIPRESS) 5 MG capsule - approved  Approved Dose/Quantity:   Reference #: [id number]   Insurance Company: [employer] - Phone [phone] Fax [phone]  Expected CoPay: $10.00     CoPay Card Available:      Foundation Assistance Needed:    Which Pharmacy is filling the prescription (Not needed for infusion/clinic administered): [facility] [address] [place] [address], [place] [address] [facility] [address]  Pharmacy Notified: Yes  Patient Notified: Yes

## 2017-05-11 ENCOUNTER — MEDICAL CORRESPONDENCE (OUTPATIENT)
Dept: HEALTH INFORMATION MANAGEMENT | Facility: CLINIC | Age: 62
End: 2017-05-11

## 2017-05-12 ENCOUNTER — OFFICE VISIT (OUTPATIENT)
Dept: PODIATRY | Facility: CLINIC | Age: 62
End: 2017-05-12
Payer: COMMERCIAL

## 2017-05-12 VITALS — OXYGEN SATURATION: 95 % | HEART RATE: 59 BPM | SYSTOLIC BLOOD PRESSURE: 105 MMHG | DIASTOLIC BLOOD PRESSURE: 73 MMHG

## 2017-05-12 DIAGNOSIS — M20.42 HAMMER TOES OF BOTH FEET: Primary | ICD-10-CM

## 2017-05-12 DIAGNOSIS — M20.41 HAMMER TOES OF BOTH FEET: Primary | ICD-10-CM

## 2017-05-12 DIAGNOSIS — E11.40 DIABETIC NEUROPATHY WITH NEUROLOGIC COMPLICATION (H): ICD-10-CM

## 2017-05-12 DIAGNOSIS — L60.2 ONYCHAUXIS: ICD-10-CM

## 2017-05-12 DIAGNOSIS — E11.49 DIABETIC NEUROPATHY WITH NEUROLOGIC COMPLICATION (H): ICD-10-CM

## 2017-05-12 PROCEDURE — 11719 TRIM NAIL(S) ANY NUMBER: CPT | Performed by: PODIATRIST

## 2017-05-12 PROCEDURE — 99212 OFFICE O/P EST SF 10 MIN: CPT | Mod: 25 | Performed by: PODIATRIST

## 2017-05-12 NOTE — PATIENT INSTRUCTIONS
Thanks for coming today.  Ortho/Sports Medicine Clinic  [address] [place] [address]    To schedule future appointments in Ortho Clinic, you may call [phone].    To schedule ordered imaging by your Provider: Call [facility] at [phone]    [employer] available online at:   [email or web address]    Please call if any further questions or concerns [phone] and ask for the Orthopedic Department. Clinic hours 8 am to 5 pm.    Return to clinic if symptoms worsen.

## 2017-05-12 NOTE — NURSING NOTE
"Elizabeth Palma's goals for this visit include: Diabetic foot check   She requests these members of her care team be copied on today's visit information: yes    PCP: Horacio Sheridan    Referring Provider:  ESTABLISHED PATIENT  No address on file    Chief Complaint   Patient presents with     RECHECK     Diabetic foot check        Initial /73  Pulse 59  SpO2 95% Estimated body mass index is 33.07 kg/(m^2) as calculated from the following:    Height as of 5/2/17: 1.549 m (5' 1\").    Weight as of 5/2/17: 79.4 kg (175 lb).  Medication Reconciliation: complete    "

## 2017-05-12 NOTE — PROGRESS NOTES
Past Medical History:   Diagnosis Date     Abnormal MRI, cervical spine 10/15/2011    2011; mild changes noted. Study done for left arm symptoms Impression:  1. Mild multilevel degenerative disc disease with no significant canal or neural stenosis seen. motion artifact on the STIR images in these are not interpretable. The remaining images were interpreted      Autosomal dominant polycystic kidney disease 2011     (Problem list name updated by automated process. Provider to review and confirm.)     CMC DJD(carpometacarpal degenerative joint disease), localized primary 3/5/2013     -donor kidney transplant 3/20/2014     DM type 2 (diabetes mellitus, type 2) (H) 2013     Encounter for long-term (current) use of other medications 2015     Family history of tremor 10/17/2011     Generalized anxiety disorder 11/15/2012     Glaucoma      Hyperlipidemia 10/15/2011     Hyperparathyroidism, secondary (H) 2015     Hypertension      Immunosuppressed status (H) 3/20/2014     Major depressive disorder, recurrent episode, moderate (H) 11/15/2012     Obesity (BMI 30-39.9)      OP (osteoporosis) T score -3.8 2009    2007 T-score -3.7      LION (obstructive sleep apnoea) 10/15/2012    intol to cpa     Pain in joint, forearm -- L unhealed Fx 2013     Premature menopause age 37 7/10/2012    OCP (vaginal bldg)-->HT which she stopped 2 mo later documented at 2007 visit (age 51).      Restless leg syndrome      Rib fractures 2013     Sensory loss 10/17/2011    Bottom of feet; uncertain if there is a neuropathy per notes.       Stiffness of joint, not elsewhere classified, hand 3/5/2013     Tremor 10/15/2011    head     Patient Active Problem List   Diagnosis     Autosomal dominant polycystic kidney disease     Hypertension     Hyperlipidemia     Abnormal MRI, cervical spine     Sensory loss     Premature menopause age 37     OP (osteoporosis) T score -3.8     LION on CPAP     Major  depressive disorder, recurrent episode, moderate (H)     Generalized anxiety disorder     CMC DJD(carpometacarpal degenerative joint disease), localized primary     Pain in joint, forearm -- L unhealed Fx     -donor kidney transplant     Immunosuppressed status (H)     Hyperparathyroidism, secondary (H)     Hyperparathyroidism (H)     Senile osteoporosis     Pain in joint involving ankle and foot     Nightmares associated with chronic post-traumatic stress disorder     Type 2 diabetes mellitus with peripheral neuropathy (H)     Posttraumatic stress disorder     Right knee pain     Left knee pain     Age-related osteoporosis without current pathological fracture     Past Surgical History:   Procedure Laterality Date     C TRANSPLANTATION OF KIDNEY  3/2014     CHOLECYSTECTOMY       ESOPHAGOSCOPY, GASTROSCOPY, DUODENOSCOPY (EGD), COMBINED N/A 2015    Procedure: COMBINED ESOPHAGOSCOPY, GASTROSCOPY, DUODENOSCOPY (EGD);  Surgeon: Sky Davey MD;  Location:  GI     ESOPHAGOSCOPY, GASTROSCOPY, DUODENOSCOPY (EGD), COMBINED N/A 2015    Procedure: COMBINED ESOPHAGOSCOPY, GASTROSCOPY, DUODENOSCOPY (EGD), BIOPSY SINGLE OR MULTIPLE;  Surgeon: Sky Davey MD;  Location:  GI     LAPAROSCOPY, SURGICAL; REPAIR INCISIONAL OR VENTRAL HERNIA       HERMINIA EN Y BOWEL       Social History     Social History     Marital status:      Spouse name: N/A     Number of children: N/A     Years of education: N/A     Occupational History     Not on file.     Social History Main Topics     Smoking status: Former Smoker     Smokeless tobacco: Never Used     Alcohol use Not on file     Drug use: Not on file     Sexual activity: Not on file     Other Topics Concern     Not on file     Social History Narrative     Family History   Problem Relation Age of Onset     Glaucoma No family hx of      Macular Degeneration No family hx of      Lab Results   Component Value Date    A1C 6.2 2017       SUBJECTIVE:  A 61-year-old female returns to clinic for diabetic foot check and toenail care.  She relates she is doing well.  No ulcers or sores since we have seen her last.  She still has neuropathy; that is unchanged.  She is wearing her diabetic shoes.  She needs a new prescription for those.      OBJECTIVE:  DP and PT are 2/4 bilaterally.  She has dorsally contracted digits 1-5 bilaterally.  There is no erythema, no drainage, no odor, no calor bilaterally.  She has incurvated nails 1-5 bilaterally.      ASSESSMENT AND PLAN:  Onychauxis bilaterally.  She is diabetic with peripheral neuropathy.  She has hammertoes present.  Diagnosis and treatment options discussed with the patient.  All the nails were reduced bilaterally upon consent.  Prescription for new diabetic shoes and insoles given.  She is given the phone number and address to [facility] for those.  Return to clinic and see me in 3-4 months.

## 2017-05-12 NOTE — MR AVS SNAPSHOT
After Visit Summary   5/12/2017    Elizabeth Palma    MRN: [id number]           Patient Information     Date Of Birth          1955        Visit Information        Provider Department      5/12/2017 10:00 AM Javier Tuttle DPM [facility]        Today's Diagnoses     Hammer toes of both feet    -  1    Diabetic neuropathy with neurologic complication (H)        Onychauxis          Care Instructions    Thanks for coming today.  Ortho/Sports Medicine Clinic  [address] [place] [address]    To schedule future appointments in Ortho Clinic, you may call [phone].    To schedule ordered imaging by your Provider: Call [facility] at [phone]    [employer] available online at:   [email or web address]    Please call if any further questions or concerns [phone] and ask for the Orthopedic Department. Clinic hours 8 am to 5 pm.    Return to clinic if symptoms worsen.          Follow-ups after your visit        Additional Services     ORTHOTICS REFERRAL       *This referral order prints off in the [facility]  (Orthotics & Prosthetics) Central Scheduling Office**    The [facility] Scheduling Staff will contact the patient to schedule appointments.     Central Scheduling Contact Information: [phone] ([place])    Diabetic shoes.  Custom diabetic foot orthotics, 3 pair.    Please be aware that coverage of these services is subject to the terms and limitations of your health insurance plan.  Call member services at your health plan with any benefit or coverage questions.      Please bring the following to your appointment:    >>   Any x-rays, CTs or MRIs which have been performed.  Contact the facility where they were done to arrange for  prior to your scheduled appointment.    >>   List of current medications   >>   This referral request   >>   Any documents/labs given to you for this referral                   Your next 10 appointments already scheduled     May 15, 2017  2:30 PM CDT   (Arrive by 2:15 PM)   RETURN ENDOCRINE with Lizbet Byers MD   [facility] Endocrinology ([facility])    [address] [place] [address]  [address]rd [facility] [id number] [phone]            May 19, 2017  9:00 AM CDT   LAB with [facility] LAB   [facility] Lab ([facility])    [address] [place] [address]  1st [facility] [id number] [phone]           Patient must bring picture ID.  Patient should be prepared to give a urine specimen  Please do not eat 10-12 hours before your appointment if you are coming in fasting for labs on lipids, cholesterol, or glucose (sugar).  Pregnant women should follow their Care Team instructions. Water with medications is okay. Do not drink coffee or other fluids.   If you have concerns about taking  your medications, please ask at office or if scheduling via sailsquarehart, send a message by clicking on Secure Messaging, Message Your Care Team.            May 19, 2017  9:30 AM CDT   NUTRITION VISIT with Francesca Danielle RD   [facility] Surgical Weight Management ([facility])    [address] [place] [id number] [phone]            May 22, 2017 10:45 AM CDT   (Arrive by 10:30 AM)   Return Visit with Prakash Irene MD   [facility] Medical Weight Management ([facility])    [address] [place] [id number] [phone]            May 26, 2017 10:00 AM CDT   DBT Individual Therapy with Era Gonzalez, PhD    Psychiatry Clinic ([facility])    [address]  [address] [facility] [address] [phone]            Jun 06, 2017  9:15 AM CDT   (Arrive by 9:00 AM)   Transplant Skin Check with Lonny Mcduffie MD   [facility] Dermatology ([facility])    [address] [place] [address]    [address] [facility] [address]   [phone]            Jun 06, 2017  1:00 PM CDT   Adult Med Follow UP with Chaparrita Hatch MD   [facility] Psychiatry ([facility])    [address] [facility] [address]  [facility] [address]   [phone]            Jun 16, 2017  9:30 AM CDT   NUTRITION VISIT with Francesca Danielle RD   [facility] Surgical Weight Management ([facility])    [address] [address]   [phone]            Jul 14, 2017  9:30 AM CDT   NUTRITION VISIT with Francesca Danielle RD   [facility] Surgical Weight Management ([facility])    [address] [place] [address]   [phone]            Aug 18, 2017  9:15 AM CDT   (Arrive by 9:00 AM)   Return Visit with Prakash Irene MD   [facility] Medical Weight Management ([facility])    [address] [place] [address]   [phone]              Who to contact     If you have questions or need follow up information about today's clinic visit or your schedule please contact [facility] directly at [phone].  Normal or non-critical lab and imaging results will be communicated to you by Colingohart, letter or phone within 4 business days after the clinic has received the results. If you do not hear from us within 7 days, please contact the clinic through MyChart or phone. If you have a critical or abnormal lab result, we will notify you by phone as soon as possible.  Submit refill requests through "[employer] or call your pharmacy and they will forward the refill request to us. Please allow 3 business days for your refill to be completed.          Additional Information About Your Visit        "[employer] Information     "[employer] gives you secure access to your electronic health record. If you see a primary care provider, you can also send messages to  your care team and make appointments. If you have questions, please call your primary care clinic.  If you do not have a primary care provider, please call [phone] and they will assist you.      [employer] is an electronic gateway that provides easy, online access to your medical records. With [employer], you can request a clinic appointment, read your test results, renew a prescription or communicate with your care team.     To access your existing account, please contact your [facility] or call [phone] for assistance.        Care EveryWhere ID     This is your Care EveryWhere ID. This could be used by other organizations to access your [place] medical records  [id number]        Your Vitals Were     Pulse Pulse Oximetry                59 95%           Blood Pressure from Last 3 Encounters:   05/12/17 105/73   05/02/17 129/76   04/14/17 108/75    Weight from Last 3 Encounters:   05/02/17 79.4 kg (175 lb)   04/14/17 79.4 kg (175 lb)   03/20/17 80.8 kg (178 lb 3.2 oz)              We Performed the Following     ORTHOTICS REFERRAL     TRIM NONDYSTRPHIC NAIL(S)          Today's Medication Changes          These changes are accurate as of: 5/12/17 11:59 PM.  If you have any questions, ask your nurse or doctor.               These medicines have changed or have updated prescriptions.        Dose/Directions    econazole nitrate 1 % cream   This may have changed:    - when to take this  - reasons to take this   Used for:  Type II or unspecified type diabetes mellitus with neurological manifestations, not stated as uncontrolled, Dermatophytosis of foot        Apply topically daily   Quantity:  85 g   Refills:  3                Primary Care Provider Office Phone # Fax #    Horacio Sheridan [phone]        [facility] [address] [facility] [address]  [facility] [address]        Thank you!     Thank you for choosing [facility]  for your care. Our goal is always  to provide you with excellent care. Hearing back from our patients is one way we can continue to improve our services. Please take a few minutes to complete the written survey that you may receive in the mail after your visit with us. Thank you!             Your Updated Medication List - Protect others around you: Learn how to safely use, store and throw away your medicines at www.disposemymeds.org.          This list is accurate as of: 5/12/17 11:59 PM.  Always use your most recent med list.                   Brand Name Dispense Instructions for use    acetylcysteine 600 MG Caps capsule    N-ACETYL CYSTEINE    30 capsule    Take 2 400 mg caps two times daily for total daily dose of 800 mg       albuterol 108 (90 BASE) MCG/ACT Inhaler    PROAIR HFA/PROVENTIL HFA/VENTOLIN HFA    1 Inhaler    Inhale 2 puffs into the lungs every 6 hours as needed for shortness of breath / dyspnea or wheezing       ARIPiprazole 2 MG tablet    ABILIFY    15 tablet    Take 0.5 tablets (1 mg) by mouth daily       aspirin 81 MG EC tablet     30 tablet    Take 1 tablet (81 mg) by mouth daily       BENADRYL 25 MG capsule   Generic drug:  diphenhydrAMINE      Take 25 mg by mouth At Bedtime.       blood glucose monitoring lancets     1 Box    Use to test blood sugar 2 times daily or as directed.       blood glucose monitoring meter device kit    no brand specified    1 kit    Use to test blood sugar 2 times daily or as directed.       blood glucose monitoring test strip    no brand specified    100 strip    Use to test blood sugars 2 times daily or as directed       cyanocobalamin 1000 MCG tablet    vitamin  B-12    30 tablet    Take 1 tablet by mouth daily.       cycloSPORINE modified 25 MG capsule    GENERIC EQUIVALENT    240 capsule    Take 4 capsules (100 mg) by mouth 2 times daily       econazole nitrate 1 % cream     85 g    Apply topically daily       furosemide 20 MG tablet    LASIX    90 tablet    Take 1 tablet (20 mg) by mouth daily        * gabapentin 300 MG capsule    NEURONTIN    90 capsule    Take 1 capsule (300 mg) by mouth At Bedtime       * gabapentin 600 MG tablet    NEURONTIN    90 tablet    Take 0.5 tablets (300 mg) by mouth daily At evening       latanoprost 0.005 % ophthalmic solution    XALATAN    2.5 mL    Place 1 drop into both eyes At Bedtime       metFORMIN 500 MG 24 hr tablet    GLUCOPHAGE-XR    90 tablet    Take 3 tablets (1,500 mg) by mouth daily (with dinner)       mycophenolate 250 MG capsule    CELLCEPT - GENERIC EQUIVALENT    240 capsule    Take 4 capsules (1,000 mg) by mouth 2 times daily       omeprazole 40 MG capsule    priLOSEC    90 capsule    Take 1 capsule (40 mg) by mouth daily       ondansetron 4 MG ODT tab    ZOFRAN-ODT    20 tablet    Take 1 tablet (4 mg) by mouth every 6 hours as needed       prazosin 5 MG capsule    MINIPRESS    90 capsule    Take 3 capsules (15 mg) by mouth At Bedtime       REFRESH OP      Apply to eye as needed Both eyes       simvastatin 20 MG tablet    ZOCOR    90 tablet    Take 1 tablet (20 mg) by mouth At Bedtime       sulfamethoxazole-trimethoprim 400-80 MG per tablet    BACTRIM/SEPTRA    90 tablet    Take 1 tablet by mouth daily       topiramate 200 MG tablet    TOPAMAX    60 tablet    Take 1 tablet (200 mg) by mouth 2 times daily       TYLENOL 325 MG tablet   Generic drug:  acetaminophen      Take 325-650 mg by mouth as needed       vilazodone 40 MG Tabs tablet    VIIBRYD    30 tablet    Take 1 tablet (40 mg) by mouth daily       vitamin D 1000 UNITS capsule     30 capsule    3 tabs (3000 units) daily       * Notice:  This list has 2 medication(s) that are the same as other medications prescribed for you. Read the directions carefully, and ask your doctor or other care provider to review them with you.

## 2017-05-15 ENCOUNTER — OFFICE VISIT (OUTPATIENT)
Dept: ENDOCRINOLOGY | Facility: CLINIC | Age: 62
End: 2017-05-15

## 2017-05-15 VITALS
DIASTOLIC BLOOD PRESSURE: 77 MMHG | HEART RATE: 73 BPM | WEIGHT: 175.8 LBS | SYSTOLIC BLOOD PRESSURE: 118 MMHG | BODY MASS INDEX: 34.51 KG/M2 | HEIGHT: 60 IN

## 2017-05-15 DIAGNOSIS — M81.0 AGE-RELATED OSTEOPOROSIS WITHOUT CURRENT PATHOLOGICAL FRACTURE: Primary | ICD-10-CM

## 2017-05-15 ASSESSMENT — PAIN SCALES - GENERAL: PAINLEVEL: NO PAIN (0)

## 2017-05-15 NOTE — PROGRESS NOTES
"Diabetes, Endocrinology [facility] Metabolism Clinic -return visit    Reason for visit: Elizabeth Palma is 59 year old female referred to endocrine clinic for follow up of osteoporosis.       History of presenting illness: Ms. Palma has significant past medical history of DM type 2, dyslipidemia, ESRD on HD, PCKD, HTN, s/p gastric bypass, pre mature ovarian failure, lactose intolerance. She has long standing history of osteoporosis with last DXA scan in 2009 showed t score of - 3.8 at spine and z- score of - 4.0. She was started on HD in 3/2012. Prior to that she had received treatment with alendronate and \" 3 monthly IV\" likely ibandronate but was discontinued because of progressively decline in her kidney functions. She sustained multiple fracture including bilateral wrists and ankles.    She has multiple risk factors for osteoporosis including pre mature menopause, lactose intolerance and not being able to take Ca, gastric surgery and ESRD. She was explained that CKD related bone disease is low turn over condition and usually is not treated with bisphosphonate. Had DEXA 10/14 with significant interim bone loss so was re-referred to Endocrine.    Transplant done 3 years ago with improvement in kidney function and PTH level.     Reclast infusion #1 done June 2015, #2 done in September 2016.  She continues to work to get adequate calcium and vitamin D which we again reviewed today.  Repeat DXA was done in 10/13/16 that showed improvement in lumbar verterbrae and hip compared to the last bone density.     Vitamin D3 dose is 3000 IU daily  Also getting calcium + D at 500 mg calcium twice daily and an additional dairy serving daily    No falls or fractures.   No back pain  No problems with appetite   Sleeping well     Type 2 Diabetes Mellitus with neuroapathy  Followed by PCP Dr Sheridan and doing well with control  Recently taken of Januvia due to ? Low BG  For DM2 remains on metformin XR 1500 mg daily with excellent at target " HbA1c.    No polyuria or polydipsia.     Morbid Obesity  Followed at wt loss clinic here by Dr. Irene--her wt is down about 29#   On Topiramate 200 mg daily   - states that she feels tired and somnolent but is happy with weight loss.    ROS is otherwise unremarkable.       PM/SH:  Past Medical History:   Diagnosis Date     Abnormal MRI, cervical spine 10/15/2011    2011; mild changes noted. Study done for left arm symptoms Impression:  1. Mild multilevel degenerative disc disease with no significant canal or neural stenosis seen. motion artifact on the STIR images in these are not interpretable. The remaining images were interpreted      Autosomal dominant polycystic kidney disease 2011     (Problem list name updated by automated process. Provider to review and confirm.)     CMC DJD(carpometacarpal degenerative joint disease), localized primary 3/5/2013     -donor kidney transplant 3/20/2014     DM type 2 (diabetes mellitus, type 2) (H) 2013     Encounter for long-term (current) use of other medications 2015     Family history of tremor 10/17/2011     Generalized anxiety disorder 11/15/2012     Glaucoma      Hyperlipidemia 10/15/2011     Hyperparathyroidism, secondary (H) 2015     Hypertension      Immunosuppressed status (H) 3/20/2014     Major depressive disorder, recurrent episode, moderate (H) 11/15/2012     Obesity (BMI 30-39.9)      OP (osteoporosis) T score -3.8 2009 T-score -3.7      LION (obstructive sleep apnoea) 10/15/2012    intol to cpa     Pain in joint, forearm -- L unhealed Fx 2013     Premature menopause age 37 7/10/2012    OCP (vaginal bldg)-->HT which she stopped 2 mo later documented at 2007 visit (age 51).      Restless leg syndrome      Rib fractures 2013     Sensory loss 10/17/2011    Bottom of feet; uncertain if there is a neuropathy per notes.       Stiffness of joint, not elsewhere classified, hand 3/5/2013     Tremor  10/15/2011    head     Past Surgical History:   Procedure Laterality Date     C TRANSPLANTATION OF KIDNEY  3/2014     CHOLECYSTECTOMY  1990     ESOPHAGOSCOPY, GASTROSCOPY, DUODENOSCOPY (EGD), COMBINED N/A 5/19/2015    Procedure: COMBINED ESOPHAGOSCOPY, GASTROSCOPY, DUODENOSCOPY (EGD);  Surgeon: Sky Davey MD;  Location:  GI     ESOPHAGOSCOPY, GASTROSCOPY, DUODENOSCOPY (EGD), COMBINED N/A 5/19/2015    Procedure: COMBINED ESOPHAGOSCOPY, GASTROSCOPY, DUODENOSCOPY (EGD), BIOPSY SINGLE OR MULTIPLE;  Surgeon: Sky Davey MD;  Location:  GI     LAPAROSCOPY, SURGICAL; REPAIR INCISIONAL OR VENTRAL HERNIA       HERMINIA EN Y BOWEL  1990     Medications:  Current Outpatient Prescriptions   Medication Sig Dispense Refill     prazosin (MINIPRESS) 5 MG capsule Take 3 capsules (15 mg) by mouth At Bedtime 90 capsule 3     ARIPiprazole (ABILIFY) 2 MG tablet Take 0.5 tablets (1 mg) by mouth daily 15 tablet 3     vilazodone (VIIBRYD) 40 MG TABS tablet Take 1 tablet (40 mg) by mouth daily 30 tablet 3     omeprazole (PRILOSEC) 40 MG capsule Take 1 capsule (40 mg) by mouth daily 90 capsule 3     cycloSPORINE modified (GENERIC EQUIVALENT) 25 MG capsule Take 4 capsules (100 mg) by mouth 2 times daily 240 capsule 6     simvastatin (ZOCOR) 20 MG tablet Take 1 tablet (20 mg) by mouth At Bedtime 90 tablet 3     Polyvinyl Alcohol-Povidone (REFRESH OP) Apply to eye as needed Both eyes       latanoprost (XALATAN) 0.005 % ophthalmic solution Place 1 drop into both eyes At Bedtime 2.5 mL 11     blood glucose monitoring (NO BRAND SPECIFIED) meter device kit Use to test blood sugar 2 times daily or as directed. 1 kit 0     blood glucose monitoring (NO BRAND SPECIFIED) test strip Use to test blood sugars 2 times daily or as directed 100 strip 11     blood glucose monitoring (SOFTCLIX) lancets Use to test blood sugar 2 times daily or as directed. 1 Box 11     topiramate (TOPAMAX) 200 MG tablet Take 1 tablet (200 mg) by mouth 2  times daily 60 tablet 5     sulfamethoxazole-trimethoprim (BACTRIM/SEPTRA) 400-80 MG per tablet Take 1 tablet by mouth daily 90 tablet 3     mycophenolate (CELLCEPT - GENERIC EQUIVALENT) 250 MG capsule Take 4 capsules (1,000 mg) by mouth 2 times daily 240 capsule 11     gabapentin (NEURONTIN) 600 MG tablet Take 0.5 tablets (300 mg) by mouth daily At evening 90 tablet 1     gabapentin (NEURONTIN) 300 MG capsule Take 1 capsule (300 mg) by mouth At Bedtime 90 capsule 3     metFORMIN (GLUCOPHAGE-XR) 500 MG 24 hr tablet Take 3 tablets (1,500 mg) by mouth daily (with dinner) 90 tablet 11     acetylcysteine (N-ACETYL-L-CYSTEINE) 600 MG CAPS capsule Take 2 400 mg caps two times daily for total daily dose of 800 mg 30 capsule      ondansetron (ZOFRAN-ODT) 4 MG disintegrating tablet Take 1 tablet (4 mg) by mouth every 6 hours as needed 20 tablet 3     furosemide (LASIX) 20 MG tablet Take 1 tablet (20 mg) by mouth daily 90 tablet 3     albuterol (PROAIR HFA, PROVENTIL HFA, VENTOLIN HFA) 108 (90 BASE) MCG/ACT inhaler Inhale 2 puffs into the lungs every 6 hours as needed for shortness of breath / dyspnea or wheezing 1 Inhaler 11     Cholecalciferol (VITAMIN D) 1000 UNITS capsule 3 tabs (3000 units) daily 30 capsule      econazole nitrate 1 % cream Apply topically daily (Patient taking differently: Apply topically as needed ) 85 g 3     aspirin EC 81 MG EC tablet Take 1 tablet (81 mg) by mouth daily 30 tablet 5     acetaminophen (TYLENOL) 325 MG tablet Take 325-650 mg by mouth as needed       cyanocolbalamin (VITAMIN  B-12) 1000 MCG tablet Take 1 tablet by mouth daily. 30 tablet 0     diphenhydrAMINE (BENADRYL) 25 MG capsule Take 25 mg by mouth At Bedtime.        Allergies:  Allergies   Allergen Reactions     Percocet [Oxycodone-Acetaminophen] Nausea and Vomiting     Novocain [Procaine Hcl] Hives     Had reaction 25 years ago to old renetta. Pt reports multiple injections of lidocaine since then without reaction.  Tolerated  lidocaine injection today without difficulty.  Osmar Mark MD IR Service.     Social History:  She lives with her  in [facility], no smoking or alcohol use. Worked as teacher.     Review of System:  ROS was done and was negative except mention above (gen/endo/MSK reviewed)    Physical Examination:  Vital signs:   /77  Pulse 73  Ht 1.524 m (5')  Wt 79.7 kg (175 lb 12.8 oz)  BMI 34.33 kg/m2  Estimated body mass index is 34.33 kg/(m^2) as calculated from the following:    Height as of this encounter: 1.524 m (5').    Weight as of this encounter: 79.7 kg (175 lb 12.8 oz).  SHAUNNA: Middle aged lady in no acute distress  HEENT: Head normal, eomi, nl scleral icteris or proptosis  GI: nondistended  NEURO: fully A and O, no resting tremor, nl gait  Extremities: hair distribution, no edema/c/c  SKIN: No skin changes.  PSYCH: Normal mood, pleasant affect  Spine non tender.     Laboratory Work up:   Last , K 4.2, Cr 1.06 in 11/7/16    ATTENTION:  Easy to read DXA/VFA reports (formatted and presented as tables)   can be found in EPIC under Chart review >  Imaging tab >  DXA    [facility]    [address] [place] [id number]  Phone: [phone]   Fax: [phone]     final    Patient name:                            ANITA ULRICH ([id number] )  Patient demographics:               [age] year [age] White Female of 61.0 in. height and 190.0 lbs. weight  Ordering provider:                     MAGI ENG  History:                                     BARIATRIC SURGERY, family hx of hip fractures, family hx of osteoporosis, Hyperparathyroid, kidney transplant recipient, LOW BONE DENSITY, POSTMENOPAUSAL status.  There is a history of fracture.  Current treatments:                   calcium, vitamin D, transplant medications  Previous treatments:                 ibandronate  Scan:                                        DXA exam ([id number] ): [facility]    Exam date:                               10/13/2016    Comparison:                             10/13/2016  10/13/2014  09/21/2009  11/17/2008  01/19/2007    Dual energy x-ray absorptiometry (DXA) results:      Region  Date  BMD  T - score  Z - score  BMD change from baseline  BMD % change from baseline  BMD change from previous  BMD % change from previous    L1-L4  10/13/2016  0.706 g/cm   -4.0  -3.5  -0.035 g/cm   -4.7%  0.069 g/cm   10.8%    L1-L4  10/13/2014  0.637 g/cm                 L1-L4  09/21/2009  0.718 g/cm                 L1-L4  11/17/2008  0.759 g/cm                 L1-L4  01/19/2007  0.741 g/cm       baseline  baseline  -  -        Neck Left  10/13/2016  0.806 g/cm   -1.7  -0.9            Neck Left  10/13/2014  0.765 g/cm                 Neck Left  09/21/2009  0.907 g/cm                 Neck Left  11/17/2008  0.895 g/cm                 Neck Left  01/19/2007  0.941 g/cm                     Total Left  10/13/2016  0.818 g/cm   -1.5  -1.1  -0.139 g/cm   -14.5%  0.013 g/cm   1.6%    Total Left  10/13/2014  0.805 g/cm                 Total Left  09/21/2009  0.884 g/cm                 Total Left  11/17/2008  0.924 g/cm                 Total Left  01/19/2007  0.957 g/cm       baseline  baseline  -  -                                                Neck Right  10/13/2016  0.788 g/cm   -1.8  -1.1            Neck Right  10/13/2014  0.790 g/cm                 Neck Right  09/21/2009  0.858 g/cm                 Neck Right  11/17/2008  0.825 g/cm                 Neck Right  01/19/2007  0.952 g/cm                     Total Right  10/13/2016  0.841 g/cm   -1.3  -1.0  -0.124 g/cm   -12.8%  0.014 g/cm   1.7%    Total Right  10/13/2014  0.827 g/cm                 Total Right  09/21/2009  0.892 g/cm                 Total Right  11/17/2008  0.903 g/cm                 Total Right  01/19/2007  0.965 g/cm       baseline  baseline  -  -        B2-B3  10/13/2016  0.222 g/cm   -4.5  -3.2  baseline   baseline  N/A  N/A       ?????????????????  Conclusions:    Based on the lowest and valid T-score of -4.0  at the level of the lumbar spine  according to WHO criteria for postmenopausal females and men age 50 and over (see Ref. #1), this individual has OSTEOPOROSIS . (see Ref. #4) The risk of osteoporotic fracture increases approximately 2-fold for each 1.0 SD decrease in T-score.  Low bone density is not the only risk factor for fracture; other factors to consider would include patient's age, risk of falling, risk of injury, previous osteoporotic fracture, family history of osteoporosis, etc.    Of note, the lateral lumbar spine is not used in diagnosis of osteoporosis/low bone density but may be useful in monitoring.    COMPARISONS WERE MADE ONLY TO THE PREVOIUS AND BASELINE STUDIES:  Taking into account the precision errors for this center, the calculated change in BMD at the level of the lumbar spine and bilateral total hips (bone loss) as shown is significant (see Ref.#7) compared with 2007.      Taking into account the precision errors for this center, the calculated change in BMD at the level of the lumbar spine (bone gain)  as shown is significant (see Ref.#7)  compared with 2014.  Please note that the differential diagnosis of BMD increase in the spine includes improvement due to pharmacotherapy vs inter-current progression of spine degeneration or fracture.      Clinical correlation is recommended based on the history of past fracture (see Ref #8)    Bone densitometry cannot rule out secondary causes of bone loss.  Therefore, further metabolic testing to look for secondary causes of low BMD should be performed if indicated.    Repeat DXA testing in 2 years could be considered.        Clinical correlation recommended.      Principal result :  Alanis Lucero MD, [facility]  Division of Diabetes, Endocrinology and Metabolism  [facility]  [phone]         Assessment and  Plan:  Osteoporosis with 2.5 inch height loss.  Type 2 DM and neuropathy  History of gastric bypass  Tertiary hyperparathyroidism--history of renal txp with nl kidney function and continues with mild PTH elevation, nl renal function    Plans--  Obtain VFA  Obtain Calcium labs.   Continue Vit D3 daily  Ca 500 mg BID ( aim for 1200 mg daily intake)  Zoledronic acid infusion annually cycle 2 of 5 completed in September 2016  Continue Metformin 1500 mg daily. Patient following up with Dr Sheridan.      For diet, rule of thumb--  1 cup of milk or yogurt = 1.5 oz hard cheese = about 300-350 mg elemental calcium; check the label on other potential sources like fortified almond or soy milk  can use a calcium carbonate or calcium citrate supplement to help reach that intake per day if not in the diet alone    Return in 12 months     Lizbet Byers MD  [id number]  Endocrinology Service

## 2017-05-15 NOTE — PATIENT INSTRUCTIONS
Continue Calcium and Vit D supplements    Obtain Vertebral Fracture Analaysis.     Schedule for Reclast infusion in September 2017  Labs today      To expedite your medication refill(s), please contact your pharmacy and have them fax a refill request to: [phone].  *Please allow 3 business days for routine medication refills.  *Please allow 5 business days for controlled substance medication refills.  --------------------  For scheduling appointments (including lab work), please request an appointment through [employer], or call: [phone].    For questions for your provider or the endocrine nurse, please send a [employer] message.  For after-hours urgent issues, please dial [phone], and ask to speak with the Endocrinologist On-Call.  --------------------  Please Note: If you are active on [employer], all future test results will be sent by [employer] message only and will no longer be sent by mail. You may also receive communication directly from your physician.

## 2017-05-15 NOTE — LETTER
"5/15/2017       RE: Elizabeth Palma  [address]   [place] [id number]     Dear Colleague,    Thank you for referring your patient, Elizabeth Palma, to the [facility] ENDOCRINOLOGY at [facility]. Please see a copy of my visit note below.    Diabetes, Endocrinology and Metabolism Clinic -return visit    Reason for visit: Elizabeth Palma is 59 year old female referred to endocrine clinic for follow up of osteoporosis.       History of presenting illness: Ms. Palma has significant past medical history of DM type 2, dyslipidemia, ESRD on HD, PCKD, HTN, s/p gastric bypass, pre mature ovarian failure, lactose intolerance. She has long standing history of osteoporosis with last DXA scan in 2009 showed t score of - 3.8 at spine and z- score of - 4.0. She was started on HD in 3/2012. Prior to that she had received treatment with alendronate and \" 3 monthly IV\" likely ibandronate but was discontinued because of progressively decline in her kidney functions. She sustained multiple fracture including bilateral wrists and ankles.    She has multiple risk factors for osteoporosis including pre mature menopause, lactose intolerance and not being able to take Ca, gastric surgery and ESRD. She was explained that CKD related bone disease is low turn over condition and usually is not treated with bisphosphonate. Had DEXA 10/14 with significant interim bone loss so was re-referred to Endocrine.    Transplant done 3 years ago with improvement in kidney function and PTH level.     Reclast infusion #1 done June 2015, #2 done in September 2016.  She continues to work to get adequate calcium and vitamin D which we again reviewed today.  Repeat DXA was done in 10/13/16 that showed improvement in lumbar verterbrae and hip compared to the last bone density.     Vitamin D3 dose is 3000 IU daily  Also getting calcium + D at 500 mg calcium twice daily and an additional dairy serving " daily    No falls or fractures.   No back pain  No problems with appetite   Sleeping well     Type 2 Diabetes Mellitus with neuroapathy  Followed by PCP Dr Sheridan and doing well with control  Recently taken of Januvia due to ? Low BG  For DM2 remains on metformin XR 1500 mg daily with excellent at target HbA1c.    No polyuria or polydipsia.     Morbid Obesity  Followed at wt loss clinic here by Dr. Irene--her wt is down about 29#   On Topiramate 200 mg daily   - states that she feels tired and somnolent but is happy with weight loss.    ROS is otherwise unremarkable.       PM/SH:  Past Medical History:   Diagnosis Date     Abnormal MRI, cervical spine 10/15/2011    2011; mild changes noted. Study done for left arm symptoms Impression:  1. Mild multilevel degenerative disc disease with no significant canal or neural stenosis seen. motion artifact on the STIR images in these are not interpretable. The remaining images were interpreted      Autosomal dominant polycystic kidney disease 2011     (Problem list name updated by automated process. Provider to review and confirm.)     CMC DJD(carpometacarpal degenerative joint disease), localized primary 3/5/2013     -donor kidney transplant 3/20/2014     DM type 2 (diabetes mellitus, type 2) (H) 2013     Encounter for long-term (current) use of other medications 2015     Family history of tremor 10/17/2011     Generalized anxiety disorder 11/15/2012     Glaucoma      Hyperlipidemia 10/15/2011     Hyperparathyroidism, secondary (H) 2015     Hypertension      Immunosuppressed status (H) 3/20/2014     Major depressive disorder, recurrent episode, moderate (H) 11/15/2012     Obesity (BMI 30-39.9)      OP (osteoporosis) T score -3.8 2009 T-score -3.7      LION (obstructive sleep apnoea) 10/15/2012    intol to cpa     Pain in joint, forearm -- L unhealed Fx 2013     Premature menopause age 37 7/10/2012    OCP (vaginal bldg)-->HT  which she stopped 2 mo later documented at Jan 12, 2007 visit (age 51).      Restless leg syndrome      Rib fractures 4/13/2013     Sensory loss 10/17/2011    Bottom of feet; uncertain if there is a neuropathy per notes.       Stiffness of joint, not elsewhere classified, hand 3/5/2013     Tremor 10/15/2011    head     Past Surgical History:   Procedure Laterality Date     C TRANSPLANTATION OF KIDNEY  3/2014     CHOLECYSTECTOMY  1990     ESOPHAGOSCOPY, GASTROSCOPY, DUODENOSCOPY (EGD), COMBINED N/A 5/19/2015    Procedure: COMBINED ESOPHAGOSCOPY, GASTROSCOPY, DUODENOSCOPY (EGD);  Surgeon: Sky Davey MD;  Location:  GI     ESOPHAGOSCOPY, GASTROSCOPY, DUODENOSCOPY (EGD), COMBINED N/A 5/19/2015    Procedure: COMBINED ESOPHAGOSCOPY, GASTROSCOPY, DUODENOSCOPY (EGD), BIOPSY SINGLE OR MULTIPLE;  Surgeon: Sky Davey MD;  Location:  GI     LAPAROSCOPY, SURGICAL; REPAIR INCISIONAL OR VENTRAL HERNIA       HERMINIA EN Y BOWEL  1990     Medications:  Current Outpatient Prescriptions   Medication Sig Dispense Refill     prazosin (MINIPRESS) 5 MG capsule Take 3 capsules (15 mg) by mouth At Bedtime 90 capsule 3     ARIPiprazole (ABILIFY) 2 MG tablet Take 0.5 tablets (1 mg) by mouth daily 15 tablet 3     vilazodone (VIIBRYD) 40 MG TABS tablet Take 1 tablet (40 mg) by mouth daily 30 tablet 3     omeprazole (PRILOSEC) 40 MG capsule Take 1 capsule (40 mg) by mouth daily 90 capsule 3     cycloSPORINE modified (GENERIC EQUIVALENT) 25 MG capsule Take 4 capsules (100 mg) by mouth 2 times daily 240 capsule 6     simvastatin (ZOCOR) 20 MG tablet Take 1 tablet (20 mg) by mouth At Bedtime 90 tablet 3     Polyvinyl Alcohol-Povidone (REFRESH OP) Apply to eye as needed Both eyes       latanoprost (XALATAN) 0.005 % ophthalmic solution Place 1 drop into both eyes At Bedtime 2.5 mL 11     blood glucose monitoring (NO BRAND SPECIFIED) meter device kit Use to test blood sugar 2 times daily or as directed. 1 kit 0     blood glucose  monitoring (NO BRAND SPECIFIED) test strip Use to test blood sugars 2 times daily or as directed 100 strip 11     blood glucose monitoring (SOFTCLIX) lancets Use to test blood sugar 2 times daily or as directed. 1 Box 11     topiramate (TOPAMAX) 200 MG tablet Take 1 tablet (200 mg) by mouth 2 times daily 60 tablet 5     sulfamethoxazole-trimethoprim (BACTRIM/SEPTRA) 400-80 MG per tablet Take 1 tablet by mouth daily 90 tablet 3     mycophenolate (CELLCEPT - GENERIC EQUIVALENT) 250 MG capsule Take 4 capsules (1,000 mg) by mouth 2 times daily 240 capsule 11     gabapentin (NEURONTIN) 600 MG tablet Take 0.5 tablets (300 mg) by mouth daily At evening 90 tablet 1     gabapentin (NEURONTIN) 300 MG capsule Take 1 capsule (300 mg) by mouth At Bedtime 90 capsule 3     metFORMIN (GLUCOPHAGE-XR) 500 MG 24 hr tablet Take 3 tablets (1,500 mg) by mouth daily (with dinner) 90 tablet 11     acetylcysteine (N-ACETYL-L-CYSTEINE) 600 MG CAPS capsule Take 2 400 mg caps two times daily for total daily dose of 800 mg 30 capsule      ondansetron (ZOFRAN-ODT) 4 MG disintegrating tablet Take 1 tablet (4 mg) by mouth every 6 hours as needed 20 tablet 3     furosemide (LASIX) 20 MG tablet Take 1 tablet (20 mg) by mouth daily 90 tablet 3     albuterol (PROAIR HFA, PROVENTIL HFA, VENTOLIN HFA) 108 (90 BASE) MCG/ACT inhaler Inhale 2 puffs into the lungs every 6 hours as needed for shortness of breath / dyspnea or wheezing 1 Inhaler 11     Cholecalciferol (VITAMIN D) 1000 UNITS capsule 3 tabs (3000 units) daily 30 capsule      econazole nitrate 1 % cream Apply topically daily (Patient taking differently: Apply topically as needed ) 85 g 3     aspirin EC 81 MG EC tablet Take 1 tablet (81 mg) by mouth daily 30 tablet 5     acetaminophen (TYLENOL) 325 MG tablet Take 325-650 mg by mouth as needed       cyanocolbalamin (VITAMIN  B-12) 1000 MCG tablet Take 1 tablet by mouth daily. 30 tablet 0     diphenhydrAMINE (BENADRYL) 25 MG capsule Take 25 mg by  mouth At Bedtime.        Allergies:  Allergies   Allergen Reactions     Percocet [Oxycodone-Acetaminophen] Nausea and Vomiting     Novocain [Procaine Hcl] Hives     Had reaction 25 years ago to old renetta. Pt reports multiple injections of lidocaine since then without reaction.  Tolerated lidocaine injection today without difficulty.  Osmar Mark MD IR Service.     Social History:  She lives with her  in [facility], no smoking or alcohol use. Worked as teacher.     Review of System:  ROS was done and was negative except mention above (gen/endo/MSK reviewed)    Physical Examination:  Vital signs:   /77  Pulse 73  Ht 1.524 m (5')  Wt 79.7 kg (175 lb 12.8 oz)  BMI 34.33 kg/m2  Estimated body mass index is 34.33 kg/(m^2) as calculated from the following:    Height as of this encounter: 1.524 m (5').    Weight as of this encounter: 79.7 kg (175 lb 12.8 oz).  SHAUNNA: Middle aged lady in no acute distress  HEENT: Head normal, eomi, nl scleral icteris or proptosis  GI: nondistended  NEURO: fully A and O, no resting tremor, nl gait  Extremities: hair distribution, no edema/c/c  SKIN: No skin changes.  PSYCH: Normal mood, pleasant affect  Spine non tender.     Laboratory Work up:   Last , K 4.2, Cr 1.06 in 11/7/16    ATTENTION:  Easy to read DXA/VFA reports (formatted and presented as tables)   can be found in EPIC under Chart review >  Imaging tab >  DXA    [facility]    [address] [place] [id number]  Phone: [phone]   Fax: [phone]     final    Patient name:                            ANITA ULRICH ([id number] )  Patient demographics:               [age] year [age] White Female of 61.0 in. height and 190.0 lbs. weight  Ordering provider:                     MAGI ENG  History:                                     BARIATRIC SURGERY, family hx of hip fractures, family hx of osteoporosis, Hyperparathyroid, kidney transplant  recipient, LOW BONE DENSITY, POSTMENOPAUSAL status.  There is a history of fracture.  Current treatments:                   calcium, vitamin D, transplant medications  Previous treatments:                 ibandronate  Scan:                                        DXA exam ([id number] ): FlixChip    Exam date:                               10/13/2016    Comparison:                             10/13/2016  10/13/2014  09/21/2009  11/17/2008  01/19/2007    Dual energy x-ray absorptiometry (DXA) results:      Region  Date  BMD  T - score  Z - score  BMD change from baseline  BMD % change from baseline  BMD change from previous  BMD % change from previous    L1-L4  10/13/2016  0.706 g/cm   -4.0  -3.5  -0.035 g/cm   -4.7%  0.069 g/cm   10.8%    L1-L4  10/13/2014  0.637 g/cm                 L1-L4  09/21/2009  0.718 g/cm                 L1-L4  11/17/2008  0.759 g/cm                 L1-L4  01/19/2007  0.741 g/cm       baseline  baseline  -  -        Neck Left  10/13/2016  0.806 g/cm   -1.7  -0.9            Neck Left  10/13/2014  0.765 g/cm                 Neck Left  09/21/2009  0.907 g/cm                 Neck Left  11/17/2008  0.895 g/cm                 Neck Left  01/19/2007  0.941 g/cm                     Total Left  10/13/2016  0.818 g/cm   -1.5  -1.1  -0.139 g/cm   -14.5%  0.013 g/cm   1.6%    Total Left  10/13/2014  0.805 g/cm                 Total Left  09/21/2009  0.884 g/cm                 Total Left  11/17/2008  0.924 g/cm                 Total Left  01/19/2007  0.957 g/cm       baseline  baseline  -  -                                                Neck Right  10/13/2016  0.788 g/cm   -1.8  -1.1            Neck Right  10/13/2014  0.790 g/cm                 Neck Right  09/21/2009  0.858 g/cm                 Neck Right  11/17/2008  0.825 g/cm                 Neck Right  01/19/2007  0.952 g/cm                     Total Right  10/13/2016  0.841 g/cm   -1.3  -1.0  -0.124 g/cm   -12.8%  0.014 g/cm   1.7%     Total Right  10/13/2014  0.827 g/cm                 Total Right  09/21/2009  0.892 g/cm                 Total Right  11/17/2008  0.903 g/cm                 Total Right  01/19/2007  0.965 g/cm       baseline  baseline  -  -        B2-B3  10/13/2016  0.222 g/cm   -4.5  -3.2  baseline  baseline  N/A  N/A       ?????????????????  Conclusions:    Based on the lowest and valid T-score of -4.0  at the level of the lumbar spine  according to WHO criteria for postmenopausal females and men age 50 and over (see Ref. #1), this individual has OSTEOPOROSIS . (see Ref. #4) The risk of osteoporotic fracture increases approximately 2-fold for each 1.0 SD decrease in T-score.  Low bone density is not the only risk factor for fracture; other factors to consider would include patient's age, risk of falling, risk of injury, previous osteoporotic fracture, family history of osteoporosis, etc.    Of note, the lateral lumbar spine is not used in diagnosis of osteoporosis/low bone density but may be useful in monitoring.    COMPARISONS WERE MADE ONLY TO THE PREVOIUS AND BASELINE STUDIES:  Taking into account the precision errors for this center, the calculated change in BMD at the level of the lumbar spine and bilateral total hips (bone loss) as shown is significant (see Ref.#7) compared with 2007.      Taking into account the precision errors for this center, the calculated change in BMD at the level of the lumbar spine (bone gain)  as shown is significant (see Ref.#7)  compared with 2014.  Please note that the differential diagnosis of BMD increase in the spine includes improvement due to pharmacotherapy vs inter-current progression of spine degeneration or fracture.      Clinical correlation is recommended based on the history of past fracture (see Ref #8)    Bone densitometry cannot rule out secondary causes of bone loss.  Therefore, further metabolic testing to look for secondary causes of low BMD should be performed if  indicated.    Repeat DXA testing in 2 years could be considered.        Clinical correlation recommended.      Principal result :  Alanis Lucero MD, CCD  Division of Diabetes, Endocrinology and Metabolism  [facility]  [phone]         Assessment and Plan:  Osteoporosis with 2.5 inch height loss.  Type 2 DM and neuropathy  History of gastric bypass  Tertiary hyperparathyroidism--history of renal txp with nl kidney function and continues with mild PTH elevation, nl renal function    Plans--  Obtain VFA  Obtain Calcium labs.   Continue Vit D3 daily  Ca 500 mg BID ( aim for 1200 mg daily intake)  Zoledronic acid infusion annually cycle 2 of 5 completed in September 2016  Continue Metformin 1500 mg daily. Patient following up with Dr Sheridan.      For diet, rule of thumb--  1 cup of milk or yogurt = 1.5 oz hard cheese = about 300-350 mg elemental calcium; check the label on other potential sources like fortified almond or soy milk  can use a calcium carbonate or calcium citrate supplement to help reach that intake per day if not in the diet alone    Return in 12 months     Lizbet Byers MD  [id number]  Endocrinology Service

## 2017-05-15 NOTE — MR AVS SNAPSHOT
After Visit Summary   5/15/2017    Elizabeth Palma    MRN: [id number]           Patient Information     Date Of Birth          1955        Visit Information        Provider Department      5/15/2017 2:30 PM Lizbet Byers MD [facility]        Today's Diagnoses     Age-related osteoporosis without current pathological fracture    -  1      Care Instructions    Continue Calcium and Vit D supplements    Obtain Vertebral Fracture Analaysis.     Schedule for Reclast infusion in September 2017  Labs today      To expedite your medication refill(s), please contact your pharmacy and have them fax a refill request to: [phone].  *Please allow 3 business days for routine medication refills.  *Please allow 5 business days for controlled substance medication refills.  --------------------  For scheduling appointments (including lab work), please request an appointment through Newslines, or call: [phone].    For questions for your provider or the endocrine nurse, please send a Newslines message.  For after-hours urgent issues, please dial [phone], and ask to speak with the Endocrinologist On-Call.  --------------------  Please Note: If you are active on Newslines, all future test results will be sent by Newslines message only and will no longer be sent by mail. You may also receive communication directly from your physician.          Follow-ups after your visit        Follow-up notes from your care team     Return in about 1 year (around 5/15/2018).      Your next 10 appointments already scheduled     May 19, 2017  9:00 AM CDT   LAB with [facility] ([facility])    [address] [place] [address] [phone]           Patient must bring picture ID.  Patient should be prepared to give a urine specimen  Please do not eat 10-12 hours before your appointment if you are coming in fasting for labs on lipids,  cholesterol, or glucose (sugar).  Pregnant women should follow their Care Team instructions. Water with medications is okay. Do not drink coffee or other fluids.   If you have concerns about taking  your medications, please ask at office or if scheduling via Solafeett, send a message by clicking on Secure Messaging, Message Your Care Team.            May 19, 2017  9:30 AM CDT   NUTRITION VISIT with Francesca Danielle RD   [facility] ([facility])    [address] [place] [address]  [address] [facility] [address]   [phone]            May 22, 2017  9:30 AM CDT   DX VERTEBRAL FRACTURE ANALYSIS LAT ONLY with UCDX1   [facility] ([facility])    [address] [place] [address]  [address] [facility] [id number] [phone]           Please do not take any of the following 48 hours prior to your exam: vitamins, calcium tablets, antacids.            May 22, 2017 10:45 AM CDT   (Arrive by 10:30 AM)   Return Visit with Prakash Irene MD   [facility] ([facility])    [address] [place] [address]  [address] [facility] [address]   [phone]            May 26, 2017 10:00 AM CDT   DBT Individual Therapy with Era Gonzalez, PhD    Psychiatry Clinic ([facility])    [place] [id number]  [address] [place] [address]   [phone]            Jun 06, 2017  9:15 AM CDT   (Arrive by 9:00 AM)   Transplant Skin Check with Lonny Mcduffie MD   [facility] D[facility]at[facility] ([facility])    [address] [place] [address]  [address] [facility] [address]   [phone]            Jun 06, 2017  1:00 PM CDT   Adult Med Follow UP with Chaparrita Hatch MD   [facility] Psychiatry ([facility])    [address] [place] [address]   [phone]            Jun 16, 2017  9:30 AM CDT   NUTRITION VISIT with  Francesca Danielle RD   [facility] ([facility])    [address] [address] [phone]            Jul 14, 2017  9:30 AM CDT   NUTRITION VISIT with Francesca Danielle RD   [facility] ([facility])    [address] [address]   [phone]            Aug 18, 2017  9:15 AM CDT   (Arrive by 9:00 AM)   Return Visit with Prakash Irene MD   [facility] ([facility])    [address] [address] [phone]              Future tests that were ordered for you today     Open Future Orders        Priority Expected Expires Ordered    25 Hydroxyvitamin D2 and D3 Routine  5/15/2018 5/15/2017    Renal panel Routine  5/10/2018 5/15/2017    Parathyroid Hormone Intact Routine  5/15/2018 5/15/2017    DX Vertebral Fracture Analysis Lat Only Routine  12/11/2017 5/15/2017            Who to contact     Please call your clinic at [phone] to:    Ask questions about your health    Make or cancel appointments    Discuss your medicines    Learn about your test results    Speak to your doctor   If you have compliments or concerns about an experience at your clinic, or if you wish to file a complaint, please contact [facility] Patient Relations at [phone] or email us at [email or web address]         Additional Information About Your Visit        indenihart Information     viavoot gives you secure access to your electronic health record. If you see a primary care provider, you can also send messages to your care team and make appointments. If you have questions, please call your primary care clinic.  If you do not have a primary care provider, please call [phone] and they will assist you.      [employer] is an electronic gateway  that provides easy, online access to your medical records. With [employer], you can request a clinic appointment, read your test results, renew a prescription or communicate with your care team.     To access your existing account, please contact your [facility] or call [phone] for assistance.        Care EveryWhere ID     This is your Care EveryWhere ID. This could be used by other organizations to access your [place] medical records  [id number]        Your Vitals Were     Pulse Height BMI (Body Mass Index)             73 1.524 m (5') 34.33 kg/m2          Blood Pressure from Last 3 Encounters:   05/15/17 118/77   05/12/17 105/73   05/02/17 129/76    Weight from Last 3 Encounters:   05/15/17 79.7 kg (175 lb 12.8 oz)   05/02/17 79.4 kg (175 lb)   04/14/17 79.4 kg (175 lb)                 Today's Medication Changes          These changes are accurate as of: 5/15/17  3:19 PM.  If you have any questions, ask your nurse or doctor.               These medicines have changed or have updated prescriptions.        Dose/Directions    econazole nitrate 1 % cream   This may have changed:    - when to take this  - reasons to take this   Used for:  Type II or unspecified type diabetes mellitus with neurological manifestations, not stated as uncontrolled, Dermatophytosis of foot        Apply topically daily   Quantity:  85 g   Refills:  3                Primary Care Provider Office Phone # Fax #    Horacio Sheridan [phone]        PH[facility]ICIANS [address] [facility] [address]  [facility] [address]        Thank you!     Thank you for choosing [facility] ENDOCRINOLOGY  for your care. Our goal is always to provide you with excellent care. Hearing back from our patients is one way we can continue to improve our services. Please take a few minutes to complete the written survey that you may receive in the mail after your visit with us. Thank you!             Your Updated Medication List -  Protect others around you: Learn how to safely use, store and throw away your medicines at www.disposemymeds.org.          This list is accurate as of: 5/15/17  3:19 PM.  Always use your most recent med list.                   Brand Name Dispense Instructions for use    acetylcysteine 600 MG Caps capsule    N-ACETYL CYSTEINE    30 capsule    Take 2 400 mg caps two times daily for total daily dose of 800 mg       albuterol 108 (90 BASE) MCG/ACT Inhaler    PROAIR HFA/PROVENTIL HFA/VENTOLIN HFA    1 Inhaler    Inhale 2 puffs into the lungs every 6 hours as needed for shortness of breath / dyspnea or wheezing       ARIPiprazole 2 MG tablet    ABILIFY    15 tablet    Take 0.5 tablets (1 mg) by mouth daily       aspirin 81 MG EC tablet     30 tablet    Take 1 tablet (81 mg) by mouth daily       BENADRYL 25 MG capsule   Generic drug:  diphenhydrAMINE      Take 25 mg by mouth At Bedtime.       blood glucose monitoring lancets     1 Box    Use to test blood sugar 2 times daily or as directed.       blood glucose monitoring meter device kit    no brand specified    1 kit    Use to test blood sugar 2 times daily or as directed.       blood glucose monitoring test strip    no brand specified    100 strip    Use to test blood sugars 2 times daily or as directed       cyanocobalamin 1000 MCG tablet    vitamin  B-12    30 tablet    Take 1 tablet by mouth daily.       cycloSPORINE modified 25 MG capsule    GENERIC EQUIVALENT    240 capsule    Take 4 capsules (100 mg) by mouth 2 times daily       econazole nitrate 1 % cream     85 g    Apply topically daily       furosemide 20 MG tablet    LASIX    90 tablet    Take 1 tablet (20 mg) by mouth daily       * gabapentin 300 MG capsule    NEURONTIN    90 capsule    Take 1 capsule (300 mg) by mouth At Bedtime       * gabapentin 600 MG tablet    NEURONTIN    90 tablet    Take 0.5 tablets (300 mg) by mouth daily At evening       latanoprost 0.005 % ophthalmic solution    XALATAN    2.5 mL     Place 1 drop into both eyes At Bedtime       metFORMIN 500 MG 24 hr tablet    GLUCOPHAGE-XR    90 tablet    Take 3 tablets (1,500 mg) by mouth daily (with dinner)       mycophenolate 250 MG capsule    CELLCEPT - GENERIC EQUIVALENT    240 capsule    Take 4 capsules (1,000 mg) by mouth 2 times daily       omeprazole 40 MG capsule    priLOSEC    90 capsule    Take 1 capsule (40 mg) by mouth daily       ondansetron 4 MG ODT tab    ZOFRAN-ODT    20 tablet    Take 1 tablet (4 mg) by mouth every 6 hours as needed       prazosin 5 MG capsule    MINIPRESS    90 capsule    Take 3 capsules (15 mg) by mouth At Bedtime       REFRESH OP      Apply to eye as needed Both eyes       simvastatin 20 MG tablet    ZOCOR    90 tablet    Take 1 tablet (20 mg) by mouth At Bedtime       sulfamethoxazole-trimethoprim 400-80 MG per tablet    BACTRIM/SEPTRA    90 tablet    Take 1 tablet by mouth daily       topiramate 200 MG tablet    TOPAMAX    60 tablet    Take 1 tablet (200 mg) by mouth 2 times daily       TYLENOL 325 MG tablet   Generic drug:  acetaminophen      Take 325-650 mg by mouth as needed       vilazodone 40 MG Tabs tablet    VIIBRYD    30 tablet    Take 1 tablet (40 mg) by mouth daily       vitamin D 1000 UNITS capsule     30 capsule    3 tabs (3000 units) daily       * Notice:  This list has 2 medication(s) that are the same as other medications prescribed for you. Read the directions carefully, and ask your doctor or other care provider to review them with you.

## 2017-05-16 ENCOUNTER — OFFICE VISIT (OUTPATIENT)
Dept: PSYCHIATRY | Facility: CLINIC | Age: 62
End: 2017-05-16
Attending: PSYCHOLOGIST
Payer: MEDICARE

## 2017-05-16 DIAGNOSIS — F33.1 MAJOR DEPRESSIVE DISORDER, RECURRENT EPISODE, MODERATE (H): Primary | ICD-10-CM

## 2017-05-16 DIAGNOSIS — F43.10 POSTTRAUMATIC STRESS DISORDER: ICD-10-CM

## 2017-05-16 DIAGNOSIS — F41.1 GENERALIZED ANXIETY DISORDER: ICD-10-CM

## 2017-05-16 NOTE — MR AVS SNAPSHOT
After Visit Summary   5/16/2017    Elizabeth Palma    MRN: [id number]           Patient Information     Date Of Birth          1955        Visit Information        Provider Department      5/16/2017 10:30 AM Era Gonzalez, PhD  Psychiatry Clinic        Today's Diagnoses     Major depressive disorder, recurrent episode, moderate (H)    -  1    Posttraumatic stress disorder        Generalized anxiety disorder           Follow-ups after your visit        Your next 10 appointments already scheduled     Jun 02, 2017 10:00 AM CDT   DBT Individual Therapy with Era Gonzalez, PhD    Psychiatry Clinic ([facility])    [address]  [address] [facility] [address]   [phone]            Jun 06, 2017  9:15 AM CDT   (Arrive by 9:00 AM)   Transplant Skin Check with Lonny Mcduffie MD   [facility] Dermat[facility] ([facility])    [address] [place] [address]  3rd [facility] [address]   [phone]            Jun 06, 2017  1:00 PM CDT   Adult Med Follow UP with Chaparrita Hatch MD   [facility] Psychiatry ([facility])    [address]  [facility] [address]   [phone]            Jun 16, 2017  9:00 AM CDT   LAB with  LAB   [facility] ([facility])    [address] [place] [address]  1st [facility] [id number] [phone]           Patient must bring picture ID.  Patient should be prepared to give a urine specimen  Please do not eat 10-12 hours before your appointment if you are coming in fasting for labs on lipids, cholesterol, or glucose (sugar).  Pregnant women should follow their Care Team instructions. Water with medications is okay. Do not drink coffee or other fluids.   If you have concerns about taking  your medications, please ask at office or if scheduling via Zooomrt, send a message by clicking on Secure Messaging, Message Your Care Team.             Jun 16, 2017  9:30 AM CDT   NUTRITION VISIT with Francesca Danielle RD   [facility] Surgical Weight Management ([facility])    [address] [place] [address] [phone]            Jun 16, 2017 10:00 AM CDT   XR THORACIC SPINE 3 VIEWS with UCXR1   [facility] Imaging Center Xray ([facility])    [address] [place] [address] [phone]           Please bring a list of your current medicines to your exam. (Include vitamins, minerals and over-thecounter medicines.) Leave your valuables at home.  Tell your doctor if there is a chance you may be pregnant.  You do not need to do anything special for this exam.            Jul 14, 2017  9:00 AM CDT   LAB with  LAB   [facility] Lab ([facility])    [address] [place] [address] [phone]           Patient must bring picture ID.  Patient should be prepared to give a urine specimen  Please do not eat 10-12 hours before your appointment if you are coming in fasting for labs on lipids, cholesterol, or glucose (sugar).  Pregnant women should follow their Care Team instructions. Water with medications is okay. Do not drink coffee or other fluids.   If you have concerns about taking  your medications, please ask at office or if scheduling via FundRazrhart, send a message by clicking on Secure Messaging, Message Your Care Team.            Jul 14, 2017  9:30 AM CDT   NUTRITION VISIT with Francesca Danielle RD   [facility] Surgical Weight Management ([facility])    [address] [place] [address] [phone]            Aug 18, 2017  9:15 AM CDT   (Arrive by 9:00 AM)   Return Visit with Prakash Irene MD   [facility] Medical Weight Management ([facility])    [address] [place] [address]    [phone]            Aug 21, 2017 10:00 AM CDT   Return Visit with Javier Tuttle DPM   [facility] ([facility])    [address] [place] [address] [phone]              Who to contact     Please call your clinic at [phone] to:    Ask questions about your health    Make or cancel appointments    Discuss your medicines    Learn about your test results    Speak to your doctor   If you have compliments or concerns about an experience at your clinic, or if you wish to file a complaint, please contact [facility] Patient Relations at [phone] or email us at [email or web address]         Additional Information About Your Visit        takokatharChiScan Information     .com gives you secure access to your electronic health record. If you see a primary care provider, you can also send messages to your care team and make appointments. If you have questions, please call your primary care clinic.  If you do not have a primary care provider, please call [phone] and they will assist you.      .com is an electronic gateway that provides easy, online access to your medical records. With .com, you can request a clinic appointment, read your test results, renew a prescription or communicate with your care team.     To access your existing account, please contact your [facility] or call [phone] for assistance.        Care EveryWhere ID     This is your Care EveryWhere ID. This could be used by other organizations to access your [place] medical records  [id number]         Blood Pressure from Last 3 Encounters:   05/22/17 122/68   05/15/17 118/77   05/12/17 105/73    Weight from Last 3 Encounters:   05/22/17 79.9 kg (176 lb 1.6 oz)   05/15/17 79.7 kg (175 lb 12.8 oz)   05/02/17 79.4 kg (175 lb)              Today, you had the following     No orders found for display         Today's  Medication Changes          These changes are accurate as of: 5/16/17 11:59 PM.  If you have any questions, ask your nurse or doctor.               These medicines have changed or have updated prescriptions.        Dose/Directions    econazole nitrate 1 % cream   This may have changed:    - when to take this  - reasons to take this   Used for:  Type II or unspecified type diabetes mellitus with neurological manifestations, not stated as uncontrolled, Dermatophytosis of foot        Apply topically daily   Quantity:  85 g   Refills:  3                Primary Care Provider Office Phone # Fax #    Horacio Sheridan [phone]        PHYSICIANS [address] [facility] [address]  [facility] [address]        Thank you!     Thank you for choosing PSYCHIATRY CLINIC  for your care. Our goal is always to provide you with excellent care. Hearing back from our patients is one way we can continue to improve our services. Please take a few minutes to complete the written survey that you may receive in the mail after your visit with us. Thank you!             Your Updated Medication List - Protect others around you: Learn how to safely use, store and throw away your medicines at www.disposemymeds.org.          This list is accurate as of: 5/16/17 11:59 PM.  Always use your most recent med list.                   Brand Name Dispense Instructions for use    acetylcysteine 600 MG Caps capsule    N-ACETYL CYSTEINE    30 capsule    Take 2 400 mg caps two times daily for total daily dose of 800 mg       albuterol 108 (90 BASE) MCG/ACT Inhaler    PROAIR HFA/PROVENTIL HFA/VENTOLIN HFA    1 Inhaler    Inhale 2 puffs into the lungs every 6 hours as needed for shortness of breath / dyspnea or wheezing       ARIPiprazole 2 MG tablet    ABILIFY    15 tablet    Take 0.5 tablets (1 mg) by mouth daily       aspirin 81 MG EC tablet     30 tablet    Take 1 tablet (81 mg) by mouth daily       BENADRYL 25 MG capsule   Generic drug:   diphenhydrAMINE      Take 25 mg by mouth At Bedtime.       blood glucose monitoring lancets     1 Box    Use to test blood sugar 2 times daily or as directed.       blood glucose monitoring meter device kit    no brand specified    1 kit    Use to test blood sugar 2 times daily or as directed.       blood glucose monitoring test strip    no brand specified    100 strip    Use to test blood sugars 2 times daily or as directed       cyanocobalamin 1000 MCG tablet    vitamin  B-12    30 tablet    Take 1 tablet by mouth daily.       cycloSPORINE modified 25 MG capsule    GENERIC EQUIVALENT    240 capsule    Take 4 capsules (100 mg) by mouth 2 times daily       econazole nitrate 1 % cream     85 g    Apply topically daily       * gabapentin 300 MG capsule    NEURONTIN    90 capsule    Take 1 capsule (300 mg) by mouth At Bedtime       * gabapentin 600 MG tablet    NEURONTIN    90 tablet    Take 0.5 tablets (300 mg) by mouth daily At evening       latanoprost 0.005 % ophthalmic solution    XALATAN    2.5 mL    Place 1 drop into both eyes At Bedtime       metFORMIN 500 MG 24 hr tablet    GLUCOPHAGE-XR    90 tablet    Take 3 tablets (1,500 mg) by mouth daily (with dinner)       mycophenolate 250 MG capsule    CELLCEPT - GENERIC EQUIVALENT    240 capsule    Take 4 capsules (1,000 mg) by mouth 2 times daily       omeprazole 40 MG capsule    priLOSEC    90 capsule    Take 1 capsule (40 mg) by mouth daily       ondansetron 4 MG ODT tab    ZOFRAN-ODT    20 tablet    Take 1 tablet (4 mg) by mouth every 6 hours as needed       prazosin 5 MG capsule    MINIPRESS    90 capsule    Take 3 capsules (15 mg) by mouth At Bedtime       REFRESH OP      Apply to eye as needed Both eyes       simvastatin 20 MG tablet    ZOCOR    90 tablet    Take 1 tablet (20 mg) by mouth At Bedtime       sulfamethoxazole-trimethoprim 400-80 MG per tablet    BACTRIM/SEPTRA    90 tablet    Take 1 tablet by mouth daily       topiramate 200 MG tablet    TOPAMAX     60 tablet    Take 1 tablet (200 mg) by mouth 2 times daily       TYLENOL 325 MG tablet   Generic drug:  acetaminophen      Take 325-650 mg by mouth as needed       vilazodone 40 MG Tabs tablet    VIIBRYD    30 tablet    Take 1 tablet (40 mg) by mouth daily       vitamin D 1000 UNITS capsule     30 capsule    3 tabs (3000 units) daily       * Notice:  This list has 2 medication(s) that are the same as other medications prescribed for you. Read the directions carefully, and ask your doctor or other care provider to review them with you.

## 2017-05-17 NOTE — PROGRESS NOTES
"Group Therapy N = 6 present; 80 min  Diagnoses: Major Depression (F33.0), Generalized Anxiety (F41.1)   Co-Facilitators: Murphy Gonzalez  and Megan Gomez (absent)      S/O: Reviewed Homework and what was left over from last week.      Other Topics Discussed:   1. Preparing for mother's day emotionally and also making connections with children/mother if alive.   One group member shared how she  Was keeping down her expectations regarding  truly belonging in both her adoptive or  biological families and how she  Was planning to spend Mother's day with her daughter. . Another group member  Was preparing for Mother's Day to be  difficult for her because it reminds her of her estranged mother  And her children who have distanced themselves. Discussed emotional and physical boundaries and how to allow ourselves to manage our emotions and make decisions how we want to handle the day.  Elizabeth reported that she hoped to see at least one son.       2. Finding housing and living within a budget.  One group member discussed how she is preparing to move within the next year and working on getting on waitlist for low income housing.  She is looking into [facility] [place] which has activities--so she needs section 8. Some places don't allow pets. nother group member talked about having \"been free from career for 9 years and now after 5 years has to worry about housing and the cost. Looking into [employer] pension.      3. Dealing with hard situations at work effectively.  Akanksha talked about how she got her hair cut and \"all dolled up\" for her HR meeting and had the [employer] on the phone. 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          Assessment:Elizabeth came on time to the group. She was engaged in the session and gave feedback when directed. . She is still new to the group and finding her place.     Plan: cont weekly group to " work on goals. See treatment plan.

## 2017-05-17 NOTE — PROGRESS NOTES
"Group Therapy N = 6 present; 80 min  Diagnoses: Major Depression (F33.0), Generalized Anxiety (F41.1)   Co-Facilitators: Murphy Gonzalez (absent) and Megan Gomez      S/O: Reviewed Homework and what was left over from last week.     Other Topics Discussed:   1. Mother's Day and Family Dynamics and Relationships  Elizabeth spent Mother's Day with her , son, daughter-in-law and and daughter-in-law's parents at a restaurant.  She reported that it was difficult for her because the restaurant is expensive and her daughter-in-law's family ordered more food and expected Elizabeth to pay half.  She reports that it can be difficult to set boundaries.  Another group member discussed how she was able to set boundaries with one friend but related how she is currently having difficulty setting boundaries with another friend and how an argument ruined her Mother's Day.   This group member also shared how she has experienced hurt and a sense of not truly belonging in both her adoptive and biological families and how she learned how to be a good mother by not doing what her mother had done to her.  Another group member validated how difficult it can be to deal with family members because \"you don't get to choose them,\" and  discussed how Mother's Day was difficult for her because it reminds her of her estranged mother and how she hasn't been a part of her life for over 16 years and shared a piece she had written about the experience.  Another group member discussed losing touch with her siblings and how her experience growing up the youngest of many children has both positively and negatively effected her life.   Another group member also shared that Mother's Day is difficult because she is currently estranged from her son and how she tries to fill the void by spending time with friends and working.      2. Finding and Maintaining Supportive Friendships  Elizabeth discussed how she has been able to reconnect with a friend from " "childhood through Facebook.  She reports that she had tried calling her on the phone but had to be persistent to get in touch with her.  Another group member validated how difficult it was to get in contact with a close friend of hers at times and also discussed her feelings of loss when she discovered that this good friend passed away unexpectedly.  Another group member discussed how she is been having an ongoing argument with a close friend and needs to discuss boundaries with this friend today and how she is nervous about it.  Another group member validated that it is difficult when friendships change and that it's hard to grieve the loss of a friendship, but that it is sometimes necessary to take care of oneself.  Another group member discussed that sometimes \"you change and you grow but your friends don't\" and that it's important to realize when it's time to distance yourself from the friendship.    3. Seeking and Developing Healthy Romantic Relationships.  One group member regrets that she never got  and had a family.  She reported that she was engaged once to a man that was already .  She discussed how the experience really affected her ability to date and seek romantic relationships again.  Another group member validated her sharing a negative experience she had with the father of her son, who was arrested after legal troubles.  Both members agreed that they were glad they had not actually  these men.  Another group member discussed how she may start dating a friend.  Elizabeth, being the only  member of the group, discussed how her relationship has had it's difficulties especially after having 6 miscarriages.           Assessment: Elizabeth arrived on time to the group. She seemed to listen to other group members attentively and validated other group members concerns. She seems motivated to attend group.   Each week, she seems more comfortable with the group and seems to be more open and " willing to share and comment on what others share. She states that despite being very socially connected and  that she lacks close, fulfilling relationships with friends. She has worked to reconnect with a childhood friend.       Plan: Continue weekly group therapy to work on goals. See treatment plan.    I was not present for the session. I reviewed the case and the note. No charge for the group. Era Gonzalez, PhD LP

## 2017-05-19 ENCOUNTER — ALLIED HEALTH/NURSE VISIT (OUTPATIENT)
Dept: SURGERY | Facility: CLINIC | Age: 62
End: 2017-05-19

## 2017-05-19 DIAGNOSIS — E11.42 TYPE 2 DIABETES MELLITUS WITH DIABETIC POLYNEUROPATHY, WITHOUT LONG-TERM CURRENT USE OF INSULIN (H): ICD-10-CM

## 2017-05-19 DIAGNOSIS — Z79.899 ENCOUNTER FOR LONG-TERM CURRENT USE OF MEDICATION: ICD-10-CM

## 2017-05-19 DIAGNOSIS — M81.0 SENILE OSTEOPOROSIS: ICD-10-CM

## 2017-05-19 DIAGNOSIS — R53.83 FATIGUE, UNSPECIFIED TYPE: ICD-10-CM

## 2017-05-19 DIAGNOSIS — Z48.298 AFTERCARE FOLLOWING ORGAN TRANSPLANT: ICD-10-CM

## 2017-05-19 DIAGNOSIS — Z94.0 KIDNEY REPLACED BY TRANSPLANT: ICD-10-CM

## 2017-05-19 DIAGNOSIS — M81.0 AGE-RELATED OSTEOPOROSIS WITHOUT CURRENT PATHOLOGICAL FRACTURE: ICD-10-CM

## 2017-05-19 LAB
ALBUMIN SERPL-MCNC: 3.7 G/DL (ref 3.4–5)
ANION GAP SERPL CALCULATED.3IONS-SCNC: 10 MMOL/L (ref 3–14)
BUN SERPL-MCNC: 16 MG/DL (ref 7–30)
CALCIUM SERPL-MCNC: 9 MG/DL (ref 8.5–10.1)
CHLORIDE SERPL-SCNC: 107 MMOL/L (ref 94–109)
CO2 SERPL-SCNC: 23 MMOL/L (ref 20–32)
CREAT SERPL-MCNC: 0.85 MG/DL (ref 0.52–1.04)
CYCLOSPORINE BLD LC/MS/MS-MCNC: 72 UG/L (ref 50–400)
ERYTHROCYTE [DISTWIDTH] IN BLOOD BY AUTOMATED COUNT: 14.6 % (ref 10–15)
GFR SERPL CREATININE-BSD FRML MDRD: 68 ML/MIN/1.7M2
GLUCOSE SERPL-MCNC: 145 MG/DL (ref 70–99)
HBA1C MFR BLD: 6.5 % (ref 4.3–6)
HCT VFR BLD AUTO: 40.7 % (ref 35–47)
HGB BLD-MCNC: 12.9 G/DL (ref 11.7–15.7)
MCH RBC QN AUTO: 27.2 PG (ref 26.5–33)
MCHC RBC AUTO-ENTMCNC: 31.7 G/DL (ref 31.5–36.5)
MCV RBC AUTO: 86 FL (ref 78–100)
PHOSPHATE SERPL-MCNC: 3.2 MG/DL (ref 2.5–4.5)
PLATELET # BLD AUTO: 165 10E9/L (ref 150–450)
POTASSIUM SERPL-SCNC: 4 MMOL/L (ref 3.4–5.3)
PTH-INTACT SERPL-MCNC: 76 PG/ML (ref 12–72)
RBC # BLD AUTO: 4.74 10E12/L (ref 3.8–5.2)
SODIUM SERPL-SCNC: 140 MMOL/L (ref 133–144)
TME LAST DOSE: 2100 H
TSH SERPL DL<=0.05 MIU/L-ACNC: 2.67 MU/L (ref 0.4–4)
WBC # BLD AUTO: 3.6 10E9/L (ref 4–11)

## 2017-05-19 NOTE — PROGRESS NOTES
"Nutrition Reassessment  Reason For Visit:  Elizabeth Palma is a 61 year-old female with type 2 DM (oral med management) presenting today for nutrition follow-up, s/p \"gastric bypass in 1990 at [facility]\" per Pt report.  She is seeing medical weight management as well.  She was referred by Dr. Irene.  Note: Pt had a kidney transplant on March 20, 2014.    Pt was accompanied by her .     Anthropometrics:  Height: 61\"  Pre-op Weight: 265 lbs per Pt report; lowest weight after bariatric surgery: 165-170 lbs (25 years ago)  (Pt reported that she initially was at 215 lbs in 2015 prior to seeing writer.)    Current Weight (5/19/17): 177.0 lbs (+1.6 lbs over the past month)    Current Vitamins/Minerals: 3000 International Units vitamin D/day, Calcium, vitamin C, vitamin B12 daily, B-complex  *Pt stated that she was told not to take other vitamins/minerals by MD.    Nutrition History:  Lactose intolerance ever since bariatric surgery.  Pt stated that she has osteoporosis; however, she stated that her doctors don't want her to take any vitamins or minerals due to her kidney transplant.    Diet/Nutrition Intake Hx: Pt reports her intake varies due to fluctuating appetite. Per pt there are days when she eats 3 meals but on other days she may not eat much at all or nibble on something through out the day. Pt also states her intake is affected by nausea 2/2 her anti-rejection medications. However pt reports she tries to make healthy choices (lower in calorie). Pt is also limited in protein choices that she likes - pt reports she does not take much dairy, does not like beans and lentils, keeps kosher. Advised pt to try to time her meals and eat every 4 hours instead of grazing but pt refused stating she would rather not eat at all.     Physical Activity Assessment: Pt reports she has a tendency to break bones so she is limited with what exercises she does. Pt states there is a long hallway in the building that she needs " to walk when going out. Pt states she does not walk for exercises but walks only to get ADLs done.     Progress with Previous Goals:  1. Eat lean protein at each meal (3 times/day) along with a non-starchy vegetable or whole fruit, up to 1/2 c carb at a meal - Unable to assess clearly, pt states she is trying to incorporate healthy foods/lean proteins but pt at times grazes on very small servings of cashew nuts (per pt 5-6 pieces at a time)   -Try having some fish for a change.               -Try the lower sodium, low fat cottage cheese.   2. If needing a snack, have vegetables - Met  4. Restart exercise routine (at least 1 time/week walking on the treadmill 15 min + additional 30 min walking).  (Try a class again.) - Not Met, pt reports feeling tired.     Per Previous Note:    *Pt stated that she will not record food intake due to the fact that every time she does this, she simply does not eat as she doesn't want to record.  She stated that her therapist strongly advises against recording food intake for this reason.    Nutrition Prescription:  Grams Protein: 60 (minimum)  Amount of Fluid: 48-64 oz    Nutrition Diagnosis  Previous: Obesity related to excessive energy intake as evidenced by BMI > 30.- continues    Intervention  Intervention At Appointment:  Materials/Education provided:  Commended pt for trying to make healthier choices for meals. Reinforced previous goals including optimizing lean protein in light of her h/o a RNY GB.  Continued to encourage her to restart exercise by walking at least 15 minutes a day x 3 times a week or try a group class such as water aerobics or No class to keep her interested and for extra motivation to exercise.  Gave encouragement and support to continue.       Patient Understanding: good  Expected Compliance: good    Goals:    1. Avoid grazing through, Eat 3 meals with lean protein at each meal, along with a non-starchy vegetable or whole fruit, up to 1/2 c carb at a  meal.    2. If needing a snack, have vegetables (give at least 2 hours between a meals and a snack).  4. Restart exercise routine (at least 1 time/week walking for 15 min/day slowly increase by 5 minutes a day to a goal of at least 30 minutes or Try a group class     Follow-Up: 1 month    Time spent with patient: 30 minutes.    Mary Gonsalves RD LD

## 2017-05-19 NOTE — MR AVS SNAPSHOT
MRN:[id number]                      After Visit Summary   5/19/2017    Elizabeth Palma    MRN: [id number]           Visit Information        Provider Department      5/19/2017 9:30 AM Francesca Danielle RD [facility] Surgical Weight Management        Your next 10 appointments already scheduled     May 22, 2017  9:30 AM CDT   DX VERTEBRAL FRACTURE ANALYSIS LAT ONLY with UCDX1   [facility] ([facility])    [address]  [address] [facility] [address] [phone]           Please do not take any of the following 24 hours prior to the day of your exam: vitamins, calcium tablets, antacids.            May 22, 2017 10:45 AM CDT   (Arrive by 10:30 AM)   Return Visit with Prakash Irene MD   [facility] Medical Weight Management ([facility])    [address] [place] [address]  [address] [facility] [address]   [phone]            May 26, 2017 10:00 AM CDT   DBT Individual Therapy with Era Gonzalez, PhD    Psychiatry Clinic ([facility])    [place] [id number]  [address] [facility] [address]   [phone]            Jun 06, 2017  9:15 AM CDT   (Arrive by 9:00 AM)   Transplant Skin Check with Lonny Mcduffie MD   [facility] Dermatology ([facility])    [address] [place] [address]  [address] [facility] [address]   [phone]            Jun 06, 2017  1:00 PM CDT   Adult Med Follow UP with Chaparrita Hatch MD   [facility] Psychiatry ([facility])    [address]  [facility] [address]   [phone]            Jun 16, 2017  9:30 AM CDT   NUTRITION VISIT with Francesca Danielle RD   [facility] Surgical Weight Management ([facility])    [address] [place] [address]  [address] [facility] [address]   [phone]            Jul 14, 2017  9:30 AM CDT   NUTRITION VISIT with Francesca Kinney  SUJATA Danielle [facility] Surgical Weight Management ([facility])    [address] [address]   [phone]            Aug 18, 2017  9:15 AM CDT   (Arrive by 9:00 AM)   Return Visit with Prakash Irene MD   [facility] Medical Weight Management ([facility])    [address] [place] [address]   [phone]            Aug 21, 2017 10:00 AM CDT   Return Visit with Javier Tuttle DPM   [facility] ([facility])    [address] [place] [address]   [phone]            Aug 25, 2017  9:30 AM CDT   NUTRITION VISIT with SUJATA Stapleton [facility] Surgical Weight Management ([facility])    [address] [place] [address] [phone]              [employer] Information     [employer] gives you secure access to your electronic health record. If you see a primary care provider, you can also send messages to your care team and make appointments. If you have questions, please call your primary care clinic.  If you do not have a primary care provider, please call [phone] and they will assist you.      [employer] is an electronic gateway that provides easy, online access to your medical records. With [employer], you can request a clinic appointment, read your test results, renew a prescription or communicate with your care team.     To access your existing account, please contact your [facility] or call [phone] for assistance.        Care EveryWhere ID     This is your Care EveryWhere ID. This could be used by other organizations to access your [facility] medical records  [id number]

## 2017-05-22 ENCOUNTER — OFFICE VISIT (OUTPATIENT)
Dept: ENDOCRINOLOGY | Facility: CLINIC | Age: 62
End: 2017-05-22

## 2017-05-22 VITALS
DIASTOLIC BLOOD PRESSURE: 68 MMHG | TEMPERATURE: 98.3 F | BODY MASS INDEX: 34.57 KG/M2 | OXYGEN SATURATION: 99 % | HEIGHT: 60 IN | WEIGHT: 176.1 LBS | HEART RATE: 66 BPM | SYSTOLIC BLOOD PRESSURE: 122 MMHG

## 2017-05-22 DIAGNOSIS — E66.01 MORBID OBESITY DUE TO EXCESS CALORIES (H): Primary | ICD-10-CM

## 2017-05-22 RX ORDER — BLOOD-GLUCOSE METER
EACH MISCELLANEOUS
Refills: 0 | COMMUNITY
Start: 2017-05-04 | End: 2022-03-09

## 2017-05-22 ASSESSMENT — PAIN SCALES - GENERAL: PAINLEVEL: NO PAIN (0)

## 2017-05-22 ASSESSMENT — ENCOUNTER SYMPTOMS
DECREASED CONCENTRATION: 1
NERVOUS/ANXIOUS: 1
PANIC: 0
DEPRESSION: 1
INSOMNIA: 0

## 2017-05-22 NOTE — PROGRESS NOTES
Return Medical Weight Management Note     Elizabeth Palma  MRN:  [id number]  :  1955  AYSE:  2017    Dear Dr. Sheridan,      I had the pleasure of seeing your patient Elizabeth Palma.  She is a 60 year old female who I am continuing to see for treatment of obesity related to: DM-2, Hyperlipidemia, Sleep Apnea (has CPAP and does not use), GERD and Depression.    CURRENT WEIGHT:   176 lbs 1.6 oz    Wt Readings from Last 4 Encounters:   17 79.9 kg (176 lb 1.6 oz)   05/15/17 79.7 kg (175 lb 12.8 oz)   17 79.4 kg (175 lb)   17 79.4 kg (175 lb)     Body Mass Index:  Body mass index is 34.39 kg/(m^2).  Vitals:  B/P: 119/79, P: 60    Initial consult weight was 214 on 2015.  Weight change since last seen on 2017 is down 4 pounds.   Total loss is 36 pounds.    INTERVAL HISTORY:  Topiramate 200 AM and 200 HS along with decreased gabapentin, and pain is ok. Still having problems with evening and night eating.     Diet and Activity Changes Since Last Visit Reviewed With Patient 2017   I have made the following changes to my diet since my last visit: more veggies   With regards to my diet, I am still struggling with: hunger   For breakfast, I typically eat: nothing   For lunch, I typically eat: half sandwich and veggies   For supper, I typically eat: protien carb and veggies   For snack(s), I typically eat: protein bar  fruit  veggies  nuts   I have made the following changes to my activity/exercise since my last visit: trying to walk more   With regards to my activity/exercise, I am still struggling with: trying to walk more     MEDICATIONS:   Current Outpatient Prescriptions   Medication     blood glucose monitoring (ACCU-CHEK RALPH PLUS) meter device kit     prazosin (MINIPRESS) 5 MG capsule     ARIPiprazole (ABILIFY) 2 MG tablet     vilazodone (VIIBRYD) 40 MG TABS tablet     omeprazole (PRILOSEC) 40 MG capsule     cycloSPORINE modified (GENERIC EQUIVALENT) 25 MG capsule      simvastatin (ZOCOR) 20 MG tablet     Polyvinyl Alcohol-Povidone (REFRESH OP)     latanoprost (XALATAN) 0.005 % ophthalmic solution     blood glucose monitoring (NO BRAND SPECIFIED) meter device kit     blood glucose monitoring (NO BRAND SPECIFIED) test strip     blood glucose monitoring (SOFTCLIX) lancets     topiramate (TOPAMAX) 200 MG tablet     sulfamethoxazole-trimethoprim (BACTRIM/SEPTRA) 400-80 MG per tablet     mycophenolate (CELLCEPT - GENERIC EQUIVALENT) 250 MG capsule     gabapentin (NEURONTIN) 600 MG tablet     gabapentin (NEURONTIN) 300 MG capsule     metFORMIN (GLUCOPHAGE-XR) 500 MG 24 hr tablet     acetylcysteine (N-ACETYL-L-CYSTEINE) 600 MG CAPS capsule     ondansetron (ZOFRAN-ODT) 4 MG disintegrating tablet     furosemide (LASIX) 20 MG tablet     albuterol (PROAIR HFA, PROVENTIL HFA, VENTOLIN HFA) 108 (90 BASE) MCG/ACT inhaler     Cholecalciferol (VITAMIN D) 1000 UNITS capsule     econazole nitrate 1 % cream     aspirin EC 81 MG EC tablet     acetaminophen (TYLENOL) 325 MG tablet     cyanocolbalamin (VITAMIN  B-12) 1000 MCG tablet     diphenhydrAMINE (BENADRYL) 25 MG capsule     No current facility-administered medications for this visit.        Weight Loss Medication History Reviewed With Patient 5/22/2017   Which weight loss medications are you currently taking on a regular basis?  Topamax (topiramate)   Are you having any side effects from the weight loss medication that we have prescribed you? -   If you are having side effects please describe: not sure     ASSESSMENT:   Still some progress this time. Increase topiramate to 200 morning and evening. Will trial d/cing gabapentin 300 in evening.    FOLLOW-UP:    12 weeks.  10/15 minutes spent on counseling and education    Sincerely,    Prakash Irene MD
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
## 2017-05-22 NOTE — LETTER
2017       RE: Elizabeth Palma  [address]     [facility] [address]     Dear Colleague,    Thank you for referring your patient, Elizabeth Palma, to the [facility] MEDICAL WEIGHT MANAGEMENT at [facility]. Please see a copy of my visit note below.        Return Medical Weight Management Note     Elizabeth Palma  MRN:  [id number]  :  1955  AYSE:  2017    Dear Dr. Sheridan,      I had the pleasure of seeing your patient Elizabeth Palma.  She is a 60 year old female who I am continuing to see for treatment of obesity related to: DM-2, Hyperlipidemia, Sleep Apnea (has CPAP and does not use), GERD and Depression.    CURRENT WEIGHT:   176 lbs 1.6 oz    Wt Readings from Last 4 Encounters:   17 79.9 kg (176 lb 1.6 oz)   05/15/17 79.7 kg (175 lb 12.8 oz)   17 79.4 kg (175 lb)   17 79.4 kg (175 lb)     Body Mass Index:  Body mass index is 34.39 kg/(m^2).  Vitals:  B/P: 119/79, P: 60    Initial consult weight was 214 on 2015.  Weight change since last seen on 2017 is down 4 pounds.   Total loss is 36 pounds.    INTERVAL HISTORY:  Topiramate 200 AM and 200 HS along with decreased gabapentin, and pain is ok. Still having problems with evening and night eating.     Diet and Activity Changes Since Last Visit Reviewed With Patient 2017   I have made the following changes to my diet since my last visit: more veggies   With regards to my diet, I am still struggling with: hunger   For breakfast, I typically eat: nothing   For lunch, I typically eat: half sandwich and veggies   For supper, I typically eat: protien carb and veggies   For snack(s), I typically eat: protein bar  fruit  veggies  nuts   I have made the following changes to my activity/exercise since my last visit: trying to walk more   With regards to my activity/exercise, I am still struggling with: trying to walk more     MEDICATIONS:   Current Outpatient  Prescriptions   Medication     blood glucose monitoring (ACCU-CHEK RALPH PLUS) meter device kit     prazosin (MINIPRESS) 5 MG capsule     ARIPiprazole (ABILIFY) 2 MG tablet     vilazodone (VIIBRYD) 40 MG TABS tablet     omeprazole (PRILOSEC) 40 MG capsule     cycloSPORINE modified (GENERIC EQUIVALENT) 25 MG capsule     simvastatin (ZOCOR) 20 MG tablet     Polyvinyl Alcohol-Povidone (REFRESH OP)     latanoprost (XALATAN) 0.005 % ophthalmic solution     blood glucose monitoring (NO BRAND SPECIFIED) meter device kit     blood glucose monitoring (NO BRAND SPECIFIED) test strip     blood glucose monitoring (SOFTCLIX) lancets     topiramate (TOPAMAX) 200 MG tablet     sulfamethoxazole-trimethoprim (BACTRIM/SEPTRA) 400-80 MG per tablet     mycophenolate (CELLCEPT - GENERIC EQUIVALENT) 250 MG capsule     gabapentin (NEURONTIN) 600 MG tablet     gabapentin (NEURONTIN) 300 MG capsule     metFORMIN (GLUCOPHAGE-XR) 500 MG 24 hr tablet     acetylcysteine (N-ACETYL-L-CYSTEINE) 600 MG CAPS capsule     ondansetron (ZOFRAN-ODT) 4 MG disintegrating tablet     furosemide (LASIX) 20 MG tablet     albuterol (PROAIR HFA, PROVENTIL HFA, VENTOLIN HFA) 108 (90 BASE) MCG/ACT inhaler     Cholecalciferol (VITAMIN D) 1000 UNITS capsule     econazole nitrate 1 % cream     aspirin EC 81 MG EC tablet     acetaminophen (TYLENOL) 325 MG tablet     cyanocolbalamin (VITAMIN  B-12) 1000 MCG tablet     diphenhydrAMINE (BENADRYL) 25 MG capsule     No current facility-administered medications for this visit.        Weight Loss Medication History Reviewed With Patient 5/22/2017   Which weight loss medications are you currently taking on a regular basis?  Topamax (topiramate)   Are you having any side effects from the weight loss medication that we have prescribed you? -   If you are having side effects please describe: not sure     ASSESSMENT:   Still some progress this time. Increase topiramate to 200 morning and evening. Will trial d/cing gabapentin 300  in evening.    FOLLOW-UP:    12 weeks.  10/15 minutes spent on counseling and education    Sincerely,    Prakash Irene MD

## 2017-05-22 NOTE — PROGRESS NOTES
"Family Session, 60 min. Present:  Elizabeth and her  (Rahat, 62).  Intake session.  Diagnoses: Major Depression (F33.0), Generalized Anxiety (F41.1)  and PTSD        S/O: \"My  says mean things to me.\" Elizabeth came in and reported above as we discussed Why we were having some family sessions. Her  reported that he did not realize he was saying mean things and wanted to know more specifics. When Elizabeth gave an example, Rahat gave his perspective which was very different. While he spoke, Elizabeth turned away and looked disinterested and then annoys and said \"he never remembers what he says.\" Her  then said that he \"doesn't know how to respond to Elizabeth to \"not make her mad.\" We talked about how he is an \"only child\" and often gets overwhelmed and nervous around Elizabeth.     Other Topics Discussed:   1 Rahat being an only child and Elizabeth being the \"only child\" alive and functional. She is the Legal guardian for her oldest Disabled brother who has Schiz also and lives in a group home. Her other brother  in  from the same Kidney disease she has (Polysystic  His grand daughter also has PKD.    2. Poor communication--neither of them listen fully--too much defensiveness and trying to \"prove right.\"       3. Elizabeth used to have hobbies and now really doesn't much. Her  is retired and needs to get out of the house more.     4. Her  , Rahat, was terminated from his job and went on disability in --it has been a hard adjustment--they have too much time together.    5. Completed a genogram:  Rahat's mother is described as \"self-centered: She is 90 yrs old and still refuses to tell Rahat medical hx.  She \"didn't go to her husbands .:  Rahat reported that he had a 'nice grandmother and preferred to live with grandma who may have been her bonding figure. His mother \"didn't travel.    6. Working together as a team-excuse now is passing the sports team at  so she eoes leave him unstuck.\" " "She contiues to leave [place]        Assessment: Elizabeth and her   arrived on time for therapy. Both appear to have grown up in dysfunctional homes with very critical mothers. Her  appears to have been bonded to his grandmother.  is very sensitive to criticism and seems to cower to Elizabeth's comments and accusations that he is mean. There appears to be a lot of \"mind reading\" and miscommunication.  Lots of changes in the family with them becoming empty nesters and then her  loosing his job.       Plan: Continue family therapy. Complete treatment plan to include family work. I   "

## 2017-05-22 NOTE — MR AVS SNAPSHOT
After Visit Summary   5/22/2017    Elizabeth Palma    MRN: [id number]           Patient Information     Date Of Birth          1955        Visit Information        Provider Department      5/22/2017 10:45 AM Prakash Irene MD [facility] Medical Weight Management        Today's Diagnoses     Morbid obesity due to excess calories (H)    -  1       Follow-ups after your visit        Follow-up notes from your care team     Return in about 3 months (around 8/22/2017).      Your next 10 appointments already scheduled     May 26, 2017 10:00 AM CDT   DBT Individual Therapy with Era Gonzalez, PhD LP   Psychiatry Clinic ([facility])    [address]  [address] [facility] [address]   [phone]            Jun 06, 2017  9:15 AM CDT   (Arrive by 9:00 AM)   Transplant Skin Check with Lonny Mcduffie MD   [facility] Dermatology ([facility] [place])    [address] [place] [address]   [phone]            Jun 06, 2017  1:00 PM CDT   Adult Med Follow UP with Chaparrita Hatch MD   [facility] Psychiatry ([facility])    [address]  [facility] [address]   [phone]            Jun 16, 2017  9:30 AM CDT   NUTRITION VISIT with Francesca Danielle RD   [facility] Surgical Weight Management ([facility] [place])    [address] [place] [address]   [phone]            Jul 14, 2017  9:30 AM CDT   NUTRITION VISIT with Francesca Danielle RD   [facility] Surgical Weight Management ([facility] [place])    [address] [place] [address]   [phone]            Aug 18, 2017  9:15 AM CDT   (Arrive by 9:00 AM)   Return Visit with Prakash Irene MD   [facility] Medical Weight Management ([facility] [place])    [address] [place] [address]  [phone] [phone]            Aug 21, 2017 10:00 AM CDT   Return Visit with Javier Tuttle DPM   [facility] ([facility])    [address] [place] [id number] [phone]            Aug 25, 2017  9:30 AM CDT   NUTRITION VISIT with Francesca Danielle RD   [facility] Surgical Weight Management ([facility] [place])    [address] [place] [id number] [phone]            Sep 15, 2017  9:30 AM CDT   NUTRITION VISIT with Francesca Danielle RD   [facility] Surgical Weight Management ([facility] [place])    [address] [place] [phone] [phone]            Oct 09, 2017  9:00 AM CDT   (Arrive by 8:45 AM)   Return Visit with Horacio Sheridan MD   [facility] Primary Care Clinic ([facility] [place] [facility])    [address] [place] [address] [phone]              Who to contact     Please call your clinic at [phone] to:    Ask questions about your health    Make or cancel appointments    Discuss your medicines    Learn about your test results    Speak to your doctor   If you have compliments or concerns about an experience at your clinic, or if you wish to file a complaint, please contact [facility] Patient Relations at [phone] or email us at [email or web address]         Additional Information About Your Visit        [employer] Information     [employer] gives you secure access to your electronic health record. If you see a primary care provider, you can also send messages to your care team and make appointments. If you have questions, please call your primary care clinic.  If you do not have a primary care provider, please call [phone] and they will assist you.      Omeg[employer]ave is an electronic gateway that provides easy, online access to your medical records. With [employer], you can  request a clinic appointment, read your test results, renew a prescription or communicate with your care team.     To access your existing account, please contact your [facility] or call [phone] for assistance.        Care EveryWhere ID     This is your Care EveryWhere ID. This could be used by other organizations to access your [facility] medical records  [id number]        Your Vitals Were     Pulse Temperature Height Pulse Oximetry BMI (Body Mass Index)       66 98.3  F (36.8  C) 1.524 m (5') 99% 34.39 kg/m2        Blood Pressure from Last 3 Encounters:   05/22/17 122/68   05/15/17 118/77   05/12/17 105/73    Weight from Last 3 Encounters:   05/22/17 79.9 kg (176 lb 1.6 oz)   05/15/17 79.7 kg (175 lb 12.8 oz)   05/02/17 79.4 kg (175 lb)              Today, you had the following     No orders found for display         Today's Medication Changes          These changes are accurate as of: 5/22/17 10:52 AM.  If you have any questions, ask your nurse or doctor.               These medicines have changed or have updated prescriptions.        Dose/Directions    econazole nitrate 1 % cream   This may have changed:    - when to take this  - reasons to take this   Used for:  Type II or unspecified type diabetes mellitus with neurological manifestations, not stated as uncontrolled, Dermatophytosis of foot        Apply topically daily   Quantity:  85 g   Refills:  3                Primary Care Provider Office Phone # Fax #    Horacio Sheridan [phone]        [facility] [address] [place] [address]  [facility] [address]        Thank you!     Thank you for choosing [facility] WEIGHT MANAGEMENT  for your care. Our goal is always to provide you with excellent care. Hearing back from our patients is one way we can continue to improve our services. Please take a few minutes to complete the written survey that you may receive in the mail after your visit with us. Thank you!              Your Updated Medication List - Protect others around you: Learn how to safely use, store and throw away your medicines at www.disposemymeds.org.          This list is accurate as of: 5/22/17 10:52 AM.  Always use your most recent med list.                   Brand Name Dispense Instructions for use    acetylcysteine 600 MG Caps capsule    N-ACETYL CYSTEINE    30 capsule    Take 2 400 mg caps two times daily for total daily dose of 800 mg       albuterol 108 (90 BASE) MCG/ACT Inhaler    PROAIR HFA/PROVENTIL HFA/VENTOLIN HFA    1 Inhaler    Inhale 2 puffs into the lungs every 6 hours as needed for shortness of breath / dyspnea or wheezing       ARIPiprazole 2 MG tablet    ABILIFY    15 tablet    Take 0.5 tablets (1 mg) by mouth daily       aspirin 81 MG EC tablet     30 tablet    Take 1 tablet (81 mg) by mouth daily       BENADRYL 25 MG capsule   Generic drug:  diphenhydrAMINE      Take 25 mg by mouth At Bedtime.       blood glucose monitoring lancets     1 Box    Use to test blood sugar 2 times daily or as directed.       * blood glucose monitoring meter device kit    no brand specified    1 kit    Use to test blood sugar 2 times daily or as directed.       * blood glucose monitoring meter device kit          blood glucose monitoring test strip    no brand specified    100 strip    Use to test blood sugars 2 times daily or as directed       cyanocobalamin 1000 MCG tablet    vitamin  B-12    30 tablet    Take 1 tablet by mouth daily.       cycloSPORINE modified 25 MG capsule    GENERIC EQUIVALENT    240 capsule    Take 4 capsules (100 mg) by mouth 2 times daily       econazole nitrate 1 % cream     85 g    Apply topically daily       furosemide 20 MG tablet    LASIX    90 tablet    Take 1 tablet (20 mg) by mouth daily       * gabapentin 300 MG capsule    NEURONTIN    90 capsule    Take 1 capsule (300 mg) by mouth At Bedtime       * gabapentin 600 MG tablet    NEURONTIN    90 tablet    Take 0.5 tablets (300  mg) by mouth daily At evening       latanoprost 0.005 % ophthalmic solution    XALATAN    2.5 mL    Place 1 drop into both eyes At Bedtime       metFORMIN 500 MG 24 hr tablet    GLUCOPHAGE-XR    90 tablet    Take 3 tablets (1,500 mg) by mouth daily (with dinner)       mycophenolate 250 MG capsule    CELLCEPT - GENERIC EQUIVALENT    240 capsule    Take 4 capsules (1,000 mg) by mouth 2 times daily       omeprazole 40 MG capsule    priLOSEC    90 capsule    Take 1 capsule (40 mg) by mouth daily       ondansetron 4 MG ODT tab    ZOFRAN-ODT    20 tablet    Take 1 tablet (4 mg) by mouth every 6 hours as needed       prazosin 5 MG capsule    MINIPRESS    90 capsule    Take 3 capsules (15 mg) by mouth At Bedtime       REFRESH OP      Apply to eye as needed Both eyes       simvastatin 20 MG tablet    ZOCOR    90 tablet    Take 1 tablet (20 mg) by mouth At Bedtime       sulfamethoxazole-trimethoprim 400-80 MG per tablet    BACTRIM/SEPTRA    90 tablet    Take 1 tablet by mouth daily       topiramate 200 MG tablet    TOPAMAX    60 tablet    Take 1 tablet (200 mg) by mouth 2 times daily       TYLENOL 325 MG tablet   Generic drug:  acetaminophen      Take 325-650 mg by mouth as needed       vilazodone 40 MG Tabs tablet    VIIBRYD    30 tablet    Take 1 tablet (40 mg) by mouth daily       vitamin D 1000 UNITS capsule     30 capsule    3 tabs (3000 units) daily       * Notice:  This list has 4 medication(s) that are the same as other medications prescribed for you. Read the directions carefully, and ask your doctor or other care provider to review them with you.

## 2017-05-22 NOTE — TELEPHONE ENCOUNTER
Prior Authorization Not Required      Medication: vilazodone (VIIBRYD) 40 MG TABS tablet - no PA required  Approved Dose/Quantity:   Reference #:     Insurance Company: [employer] - Phone [phone] Fax [phone]  Expected CoPay: $125.51     CoPay Card Available:      Foundation Assistance Needed:    Which Pharmacy is filling the prescription (Not needed for infusion/clinic administered): [facility] [address] [place] [address], [place] - [facility] [address] [facility] [address]  Pharmacy Notified: Yes  Patient Notified: Yes    Refill too soon, next fill 05/25/17

## 2017-05-23 ENCOUNTER — TELEPHONE (OUTPATIENT)
Dept: ENDOCRINOLOGY | Facility: CLINIC | Age: 62
End: 2017-05-23

## 2017-05-23 ENCOUNTER — OFFICE VISIT (OUTPATIENT)
Dept: PSYCHIATRY | Facility: CLINIC | Age: 62
End: 2017-05-23
Attending: PSYCHOLOGIST
Payer: MEDICARE

## 2017-05-23 DIAGNOSIS — F33.1 MAJOR DEPRESSIVE DISORDER, RECURRENT EPISODE, MODERATE (H): Primary | ICD-10-CM

## 2017-05-23 DIAGNOSIS — R60.1 GENERALIZED EDEMA: ICD-10-CM

## 2017-05-23 DIAGNOSIS — S22.070A CLOSED WEDGE COMPRESSION FRACTURE OF NINTH THORACIC VERTEBRA, INITIAL ENCOUNTER: Primary | ICD-10-CM

## 2017-05-23 DIAGNOSIS — F41.1 GENERALIZED ANXIETY DISORDER: ICD-10-CM

## 2017-05-23 DIAGNOSIS — F43.10 POSTTRAUMATIC STRESS DISORDER: ICD-10-CM

## 2017-05-23 LAB
DEPRECATED CALCIDIOL+CALCIFEROL SERPL-MC: ABNORMAL UG/L (ref 20–75)
VITAMIN D2 SERPL-MCNC: <5 UG/L
VITAMIN D3 SERPL-MCNC: 71 UG/L

## 2017-05-23 RX ORDER — FUROSEMIDE 20 MG
20 TABLET ORAL DAILY
Qty: 90 TABLET | Refills: 3 | Status: SHIPPED | OUTPATIENT
Start: 2017-05-23 | End: 2018-04-16

## 2017-05-23 NOTE — TELEPHONE ENCOUNTER
lasix  Last Written Prescription Date: 5/9/16  Last Fill Quantity: 90, # refills: 3  [place] [facility] prescribing provider: 4/14/17  Next 5 appointments (look out 90 days)            Potassium   Date Value Ref Range Status   05/19/2017 4.0 3.4 - 5.3 mmol/L Final     Creatinine   Date Value Ref Range Status   05/19/2017 0.85 0.52 - 1.04 mg/dL Final     BP Readings from Last 3 Encounters:   05/22/17 122/68   05/15/17 118/77   05/12/17 105/73

## 2017-05-23 NOTE — MR AVS SNAPSHOT
After Visit Summary   5/23/2017    Elizabeth Palma    MRN: [id number]           Patient Information     Date Of Birth          1955        Visit Information        Provider Department      5/23/2017 10:30 AM Era Gonzalez, PhD LP [facility]        Today's Diagnoses     Major depressive disorder, recurrent episode, moderate (H)    -  1    Posttraumatic stress disorder        Generalized anxiety disorder           Follow-ups after your visit        Your next 10 appointments already scheduled     Jun 16, 2017  9:00 AM CDT   LAB with  LAB   [facility] ([facility])    [address] [place] [id number] [phone]           Patient must bring picture ID.  Patient should be prepared to give a urine specimen  Please do not eat 10-12 hours before your appointment if you are coming in fasting for labs on lipids, cholesterol, or glucose (sugar).  Pregnant women should follow their Care Team instructions. Water with medications is okay. Do not drink coffee or other fluids.   If you have concerns about taking  your medications, please ask at office or if scheduling via [employer], send a message by clicking on Secure Messaging, Message Your Care Team.            Jun 16, 2017  9:30 AM CDT   NUTRITION VISIT with Francesca Danielle RD   [facility] Surgical Weight Management ([facility])    [address] [place] [id number] [phone]            Jun 16, 2017 10:00 AM CDT   XR THORACIC SPINE 3 VIEWS with UCXR1   [facility] Center Xray ([facility])    [address] [place] [id number] [phone]           Please bring a list of your current medicines to your exam. (Include vitamins, minerals and over-thecounter medicines.) Leave your valuables at home.  Tell your doctor if there is a chance you may be pregnant.  You do not need to do  anything special for this exam.            Jun 26, 2017 10:00 AM CDT   DBT Individual Therapy with Era Gonzalez, PhD    Psychiatry Clinic ([facility])    [address]  [address] [facility] [id number] [phone]            Jul 11, 2017  1:00 PM CDT   Adult Med Follow UP with Chaparrita Hatch MD   [facility] Psychiatry ([facility])    [address]  [facility] [id number] [phone]            Jul 14, 2017  9:00 AM CDT   LAB with  LAB   [facility] ([facility])    [address] [facility] [address]  [address] [facility] [id number] [phone]           Patient must bring picture ID.  Patient should be prepared to give a urine specimen  Please do not eat 10-12 hours before your appointment if you are coming in fasting for labs on lipids, cholesterol, or glucose (sugar).  Pregnant women should follow their Care Team instructions. Water with medications is okay. Do not drink coffee or other fluids.   If you have concerns about taking  your medications, please ask at office or if scheduling via [employer], send a message by clicking on Secure Messaging, Message Your Care Team.            Jul 14, 2017  9:30 AM CDT   NUTRITION VISIT with Francesca Danielle RD   [facility] ([facility])    [address] [place] [facility] [address]  [address] [facility] [id number] [phone]            Aug 18, 2017  9:15 AM CDT   (Arrive by 9:00 AM)   Return Visit with Prakash Irene MD   [facility] Medical [facility] Management ([facility])    [address] [facility] [address]  [address] [facility] [id number] [phone]            Aug 21, 2017 10:00 AM CDT   Return Visit with Javier Tuttle DPM   [facility] ([facility])    [id number] [place] [id number] [phone]            Aug 25, 2017  9:30 AM CDT    NUTRITION VISIT with Francesca Danielle RD   [facility] ([facility])    [address] [facility]  [address] [facility] [address] [phone]              Who to contact     Please call your clinic at [phone] to:    Ask questions about your health    Make or cancel appointments    Discuss your medicines    Learn about your test results    Speak to your doctor   If you have compliments or concerns about an experience at your clinic, or if you wish to file a complaint, please contact [facility] Patient Relations at [phone] or email us at [email or web address]         Additional Information About Your Visit        [employer] Information     The [email or web address] gives you secure access to your electronic health record. If you see a primary care provider, you can also send messages to your care team and make appointments. If you have questions, please call your primary care clinic.  If you do not have a primary care provider, please call [phone] and they will assist you.      The [email or web address] is an electronic gateway that provides easy, online access to your medical records. With The [email or web address], you can request a clinic appointment, read your test results, renew a prescription or communicate with your care team.     To access your existing account, please contact your [facility] or call [phone] for assistance.        Care EveryWhere ID     This is your Care EveryWhere ID. This could be used by other organizations to access your [place] medical records  [id number]         Blood Pressure from Last 3 Encounters:   06/07/17 105/73   06/06/17 162/79   05/22/17 122/68    Weight from Last 3 Encounters:   06/07/17 79.4 kg (175 lb 1.6 oz)   06/06/17 78.3 kg (172 lb 9.6 oz)   05/22/17 79.9 kg (176 lb 1.6 oz)              Today, you had the following     No orders found for display         Today's Medication  Changes          These changes are accurate as of: 5/23/17 11:59 PM.  If you have any questions, ask your nurse or doctor.               These medicines have changed or have updated prescriptions.        Dose/Directions    econazole nitrate 1 % cream   This may have changed:    - when to take this  - reasons to take this   Used for:  Type II or unspecified type diabetes mellitus with neurological manifestations, not stated as uncontrolled, Dermatophytosis of foot        Apply topically daily   Quantity:  85 g   Refills:  3            Where to get your medicines      These medications were sent to [id number] - [place], [place] - [address] AT [facility] [address]  [address], [place] [address]    Hours:  24-hours Phone:  [phone]     furosemide 20 MG tablet                Primary Care Provider Office Phone # Fax #    Horacio Sheridan [phone]        PH[facility]IC[facility] [address] [facility] [address]  [facility] [address]        Thank you!     Thank you for choosing PSYCHIATRY CLINIC  for your care. Our goal is always to provide you with excellent care. Hearing back from our patients is one way we can continue to improve our services. Please take a few minutes to complete the written survey that you may receive in the mail after your visit with us. Thank you!             Your Updated Medication List - Protect others around you: Learn how to safely use, store and throw away your medicines at www.disposemymeds.org.          This list is accurate as of: 5/23/17 11:59 PM.  Always use your most recent med list.                   Brand Name Dispense Instructions for use    acetylcysteine 600 MG Caps capsule    N-ACETYL CYSTEINE    30 capsule    Take 2 400 mg caps two times daily for total daily dose of 800 mg       albuterol 108 (90 BASE) MCG/ACT Inhaler    PROAIR HFA/PROVENTIL HFA/VENTOLIN HFA    1 Inhaler    Inhale 2 puffs into the lungs every 6 hours as needed for shortness of breath /  dyspnea or wheezing       ARIPiprazole 2 MG tablet    ABILIFY    15 tablet    Take 0.5 tablets (1 mg) by mouth daily       aspirin 81 MG EC tablet     30 tablet    Take 1 tablet (81 mg) by mouth daily       BENADRYL 25 MG capsule   Generic drug:  diphenhydrAMINE      Take 25 mg by mouth At Bedtime.       blood glucose monitoring lancets     1 Box    Use to test blood sugar 2 times daily or as directed.       * blood glucose monitoring meter device kit    no brand specified    1 kit    Use to test blood sugar 2 times daily or as directed.       * blood glucose monitoring meter device kit          blood glucose monitoring test strip    no brand specified    100 strip    Use to test blood sugars 2 times daily or as directed       cyanocobalamin 1000 MCG tablet    vitamin  B-12    30 tablet    Take 1 tablet by mouth daily.       cycloSPORINE modified 25 MG capsule    GENERIC EQUIVALENT    240 capsule    Take 4 capsules (100 mg) by mouth 2 times daily       econazole nitrate 1 % cream     85 g    Apply topically daily       furosemide 20 MG tablet    LASIX    90 tablet    Take 1 tablet (20 mg) by mouth daily       latanoprost 0.005 % ophthalmic solution    XALATAN    2.5 mL    Place 1 drop into both eyes At Bedtime       metFORMIN 500 MG 24 hr tablet    GLUCOPHAGE-XR    90 tablet    Take 3 tablets (1,500 mg) by mouth daily (with dinner)       mycophenolate 250 MG capsule    CELLCEPT - GENERIC EQUIVALENT    240 capsule    Take 4 capsules (1,000 mg) by mouth 2 times daily       omeprazole 40 MG capsule    priLOSEC    90 capsule    Take 1 capsule (40 mg) by mouth daily       ondansetron 4 MG ODT tab    ZOFRAN-ODT    20 tablet    Take 1 tablet (4 mg) by mouth every 6 hours as needed       prazosin 5 MG capsule    MINIPRESS    90 capsule    Take 3 capsules (15 mg) by mouth At Bedtime       REFRESH OP      Apply to eye as needed Both eyes       simvastatin 20 MG tablet    ZOCOR    90 tablet    Take 1 tablet (20 mg) by mouth At  Bedtime       sulfamethoxazole-trimethoprim 400-80 MG per tablet    BACTRIM/SEPTRA    90 tablet    Take 1 tablet by mouth daily       topiramate 200 MG tablet    TOPAMAX    60 tablet    Take 1 tablet (200 mg) by mouth 2 times daily       TYLENOL 325 MG tablet   Generic drug:  acetaminophen      Take 325-650 mg by mouth as needed       vilazodone 40 MG Tabs tablet    VIIBRYD    30 tablet    Take 1 tablet (40 mg) by mouth daily       vitamin D 1000 UNITS capsule     30 capsule    2,000 Units       * Notice:  This list has 2 medication(s) that are the same as other medications prescribed for you. Read the directions carefully, and ask your doctor or other care provider to review them with you.

## 2017-05-24 LAB — 1,25(OH)2D SERPL-MCNC: 86.3 PG/ML (ref 19.9–79.3)

## 2017-05-30 ENCOUNTER — OFFICE VISIT (OUTPATIENT)
Dept: PSYCHIATRY | Facility: CLINIC | Age: 62
End: 2017-05-30
Attending: PSYCHOLOGIST
Payer: MEDICARE

## 2017-05-30 DIAGNOSIS — F43.10 POSTTRAUMATIC STRESS DISORDER: ICD-10-CM

## 2017-05-30 DIAGNOSIS — F33.1 MAJOR DEPRESSIVE DISORDER, RECURRENT EPISODE, MODERATE (H): Primary | ICD-10-CM

## 2017-05-30 DIAGNOSIS — F41.1 GENERALIZED ANXIETY DISORDER: ICD-10-CM

## 2017-05-30 NOTE — PROGRESS NOTES
"Group Therapy N = 5 present; 80 min  Diagnoses: Major Depression (F33.0), Generalized Anxiety (F41.1)   Co-Facilitators: Murpyh oGnzalez and Megan Gomez      S/O: Reviewed Homework and what was left over from last week.      Other Topics Discussed:   1. Re-stablishing a hierarchy between parents and children  A group member discussed how she didn't agree with a discussion from last session that there needs to be a hierarchical relationship between parents and children and that children shouldn't be validating adults.  The group member stated, \"My kids know I have mental illness and that without my medications, I wouldn't be able to function.\"  Elizabeth agreed stated that her adult children validate her and \"applaud me for taking care of myself and give me credit for that.\"  She was able to recognize that her  also validated her and how it's important to have a peer or spouse validate, not only one's children.  The group discussed how children can become \"parentified\" and lose that hierarchical relationship.  Another group member discussed how she had to set boundaries with her daughter, stating, \"That's not for you to be concerned about.\"  The other group member discussed how she is working to be involved in her adult children and grandchildren's lives and to help and validate them and re-establish the hierarchy.      2. Creating appropriate boundaries in relationships  Elizabeth discussed how has been worried about the sale of her son's house falling through due to a safety concern with the house.  She stated that her son is moving out of state and will manage the sale and repairs needed and Elizabeth will be there in case someone needs to go to the house.  The group members validated how important it is that she establish appropriate boundaries and not to take on the problem as her own.  Another group member mentioned how she had a discussion with a close friend who has helped her move or with repairs around the " house, but that he does things his way and can be controlling.  She states that she is working to establish boundaries with this friend and won't ask for help.  Another patient also discussed how she is not going to push her adult daughter anymore to complete her coursework needed to graduate high school.  She states that she could try to do the assignments for her but that her daughter needs to figure it out on her own.     3. Managing stress and emotions  Elizabeth discussed how stressed she has felt about her son's house and financial situation.  She reports that she feels she has been unable to eat.  She discussed how food has always been an issue from her and opened up about her past bariatric surgery, nutritional concerns, and medications to lose weight.  She states that the stress has caused her to lose her appetite and become nauseated every time she eats.  One group member told her to think about taking small steps, eating small snacks and the importance of food for energy and to be healthy.  This group member also discussed how she practices self-care and allows herself one day of rest, but only one day.  Another group member discussed the importance of mindfulness to stay in the moment. This group member also discussed how she has had difficulty walking and that walking had been a main way for her to manage her stress.  She is working to find new ways to exercise because of it's positive effect on her mental health.  One group member suggested using an air treadmill and Elizabeth suggested going to a clinic that has the air treadmill.        Assessment: Elizabeth arrived on time to the group. She seemed to listen to other group members attentively and validated other group members concerns. She seems motivated to attend group.   Each week, she seems more comfortable with the group and seems to be more open and willing to share and comment on what others share.  Today, she opened up about food and how it has been a  sensitive issue for her.  She was able to be vulnerable and seemed open to suggestions and feedback.       Plan: Continue weekly group therapy to work on goals. See treatment plan.      I was present for and actively participated in the entire group therapy session, my observations of Elizabeth Palma s progress and participation are that we focusing on Elizabeth being less passive and coming prepared with issues to talk about. She will be going through a major life change with her son moving out of the stated.     Era Gonzalez.. PhD. LP

## 2017-05-30 NOTE — PROGRESS NOTES
The results of your test.  The vitamin D levels have increased to 71.  Similarly active vitamin D levels are higher.  The parathyroid levels continued to be mildly elevated.    The thyroid function tests were normal.    The electrolytes, renal function,hemoglobin A1c levels were stable.  The cell counts were stable.    Recommendations  Please reduce the vitamin D to 2000 international units daily  .  Lizbet Byers MD  [id number]  Endocrinology Service

## 2017-05-30 NOTE — MR AVS SNAPSHOT
After Visit Summary   5/30/2017    Elizabeth Palma    MRN: [id number]           Patient Information     Date Of Birth          1955        Visit Information        Provider Department      5/30/2017 10:30 AM Era Gonzalez, PhD  Psychiatry Clinic        Today's Diagnoses     Major depressive disorder, recurrent episode, moderate (H)    -  1    Posttraumatic stress disorder        Generalized anxiety disorder           Follow-ups after your visit        Your next 10 appointments already scheduled     Jun 06, 2017  9:15 AM CDT   (Arrive by 9:00 AM)   Transplant Skin Check with Lonny Mcduffie MD   [facility] ([facility])    [address]  3rd Floor  [facility] [address]   [phone]            Jun 06, 2017  1:00 PM CDT   Adult Med Follow UP with Chaparrita Hatch MD   [facility] ([facility]    [address]  [facility] [address]   [phone]            Jun 07, 2017 10:00 AM CDT   New Patient Visit with GERHARD King CNM   [facility] ([facility])    [facility] [address]  [address]  [address]  [facility] [address]   [phone]            Jun 16, 2017  9:00 AM CDT   LAB with  LAB   [facility] ([facility])    [address]  1st Floor  [facility] [address]   [phone]           Patient must bring picture ID.  Patient should be prepared to give a urine specimen  Please do not eat 10-12 hours before your appointment if you are coming in fasting for labs on lipids, cholesterol, or glucose (sugar).  Pregnant women should follow their Care Team instructions. Water with medications is okay. Do not drink coffee or other fluids.   If you have concerns about taking  your medications, please ask at office or if scheduling via Adomikhart, send a message by clicking on Secure Messaging, Message Your Care  Team.            Jun 16, 2017  9:30 AM CDT   NUTRITION VISIT with Francesca Danielle RD   [facility] ([facility])    [address]  [address] [facility] [address] [phone]            Jun 16, 2017 10:00 AM CDT   XR THORACIC SPINE 3 VIEWS with UCXR1   [facility] ([facility])    [address] [place] [address] [phone]           Please bring a list of your current medicines to your exam. (Include vitamins, minerals and over-thecounter medicines.) Leave your valuables at home.  Tell your doctor if there is a chance you may be pregnant.  You do not need to do anything special for this exam.            Jun 26, 2017 10:00 AM CDT   DBT Individual Therapy with Era Gonzalez, PhD    Psychiatry Clinic ([facility])    [place] [id number]  [address] [facility] [address] [phone]            Jul 14, 2017  9:00 AM CDT   LAB with  LAB   [facility] ([facility])    [address] [place] [address] [phone]           Patient must bring picture ID.  Patient should be prepared to give a urine specimen  Please do not eat 10-12 hours before your appointment if you are coming in fasting for labs on lipids, cholesterol, or glucose (sugar).  Pregnant women should follow their Care Team instructions. Water with medications is okay. Do not drink coffee or other fluids.   If you have concerns about taking  your medications, please ask at office or if scheduling via Machinio, send a message by clicking on Secure Messaging, Message Your Care Team.            Jul 14, 2017  9:30 AM CDT   NUTRITION VISIT with Francesca Danielle RD   [facility] ([facility])    [address] [place] [id number] [phone]             Aug 18, 2017  9:15 AM CDT   (Arrive by 9:00 AM)   Return Visit with Prakash Irene MD   [facility] ([facility])    [address] [address] [phone]              Who to contact     Please call your clinic at [phone] to:    Ask questions about your health    Make or cancel appointments    Discuss your medicines    Learn about your test results    Speak to your doctor   If you have compliments or concerns about an experience at your clinic, or if you wish to file a complaint, please contact [facility] Patient Relations at [phone] or email us at [email or web address]         Additional Information About Your Visit        DemandforceharBESOS Information     IdentityForge gives you secure access to your electronic health record. If you see a primary care provider, you can also send messages to your care team and make appointments. If you have questions, please call your primary care clinic.  If you do not have a primary care provider, please call [phone] and they will assist you.      IdentityForge is an electronic gateway that provides easy, online access to your medical records. With IdentityForge, you can request a clinic appointment, read your test results, renew a prescription or communicate with your care team.     To access your existing account, please contact your [facility] or call [phone] for assistance.        Care EveryWhere ID     This is your Care EveryWhere ID. This could be used by other organizations to access your [place] medical records  [id number]         Blood Pressure from Last 3 Encounters:   05/22/17 122/68   05/15/17 118/77   05/12/17 105/73    Weight from Last 3 Encounters:   05/22/17 79.9 kg (176 lb 1.6 oz)   05/15/17 79.7 kg (175 lb 12.8 oz)   05/02/17 79.4 kg (175 lb)              Today, you had the following     No orders  found for display         Today's Medication Changes          These changes are accurate as of: 5/30/17 11:59 PM.  If you have any questions, ask your nurse or doctor.               These medicines have changed or have updated prescriptions.        Dose/Directions    econazole nitrate 1 % cream   This may have changed:    - when to take this  - reasons to take this   Used for:  Type II or unspecified type diabetes mellitus with neurological manifestations, not stated as uncontrolled, Dermatophytosis of foot        Apply topically daily   Quantity:  85 g   Refills:  3                Primary Care Provider Office Phone # Fax #    Horacio Sheridan [phone]        [facility]ICIANS [address] [facility] [address]  [facility] [address]        Thank you!     Thank you for choosing PSYCHIATRY CLINIC  for your care. Our goal is always to provide you with excellent care. Hearing back from our patients is one way we can continue to improve our services. Please take a few minutes to complete the written survey that you may receive in the mail after your visit with us. Thank you!             Your Updated Medication List - Protect others around you: Learn how to safely use, store and throw away your medicines at www.disposemymeds.org.          This list is accurate as of: 5/30/17 11:59 PM.  Always use your most recent med list.                   Brand Name Dispense Instructions for use    acetylcysteine 600 MG Caps capsule    N-ACETYL CYSTEINE    30 capsule    Take 2 400 mg caps two times daily for total daily dose of 800 mg       albuterol 108 (90 BASE) MCG/ACT Inhaler    PROAIR HFA/PROVENTIL HFA/VENTOLIN HFA    1 Inhaler    Inhale 2 puffs into the lungs every 6 hours as needed for shortness of breath / dyspnea or wheezing       ARIPiprazole 2 MG tablet    ABILIFY    15 tablet    Take 0.5 tablets (1 mg) by mouth daily       aspirin 81 MG EC tablet     30 tablet    Take 1 tablet (81 mg) by mouth daily       BENADRYL 25 MG  capsule   Generic drug:  diphenhydrAMINE      Take 25 mg by mouth At Bedtime.       blood glucose monitoring lancets     1 Box    Use to test blood sugar 2 times daily or as directed.       * blood glucose monitoring meter device kit    no brand specified    1 kit    Use to test blood sugar 2 times daily or as directed.       * blood glucose monitoring meter device kit          blood glucose monitoring test strip    no brand specified    100 strip    Use to test blood sugars 2 times daily or as directed       cyanocobalamin 1000 MCG tablet    vitamin  B-12    30 tablet    Take 1 tablet by mouth daily.       cycloSPORINE modified 25 MG capsule    GENERIC EQUIVALENT    240 capsule    Take 4 capsules (100 mg) by mouth 2 times daily       econazole nitrate 1 % cream     85 g    Apply topically daily       furosemide 20 MG tablet    LASIX    90 tablet    Take 1 tablet (20 mg) by mouth daily       * gabapentin 300 MG capsule    NEURONTIN    90 capsule    Take 1 capsule (300 mg) by mouth At Bedtime       * gabapentin 600 MG tablet    NEURONTIN    90 tablet    Take 0.5 tablets (300 mg) by mouth daily At evening       latanoprost 0.005 % ophthalmic solution    XALATAN    2.5 mL    Place 1 drop into both eyes At Bedtime       metFORMIN 500 MG 24 hr tablet    GLUCOPHAGE-XR    90 tablet    Take 3 tablets (1,500 mg) by mouth daily (with dinner)       mycophenolate 250 MG capsule    CELLCEPT - GENERIC EQUIVALENT    240 capsule    Take 4 capsules (1,000 mg) by mouth 2 times daily       omeprazole 40 MG capsule    priLOSEC    90 capsule    Take 1 capsule (40 mg) by mouth daily       ondansetron 4 MG ODT tab    ZOFRAN-ODT    20 tablet    Take 1 tablet (4 mg) by mouth every 6 hours as needed       prazosin 5 MG capsule    MINIPRESS    90 capsule    Take 3 capsules (15 mg) by mouth At Bedtime       REFRESH OP      Apply to eye as needed Both eyes       simvastatin 20 MG tablet    ZOCOR    90 tablet    Take 1 tablet (20 mg) by mouth At  Bedtime       sulfamethoxazole-trimethoprim 400-80 MG per tablet    BACTRIM/SEPTRA    90 tablet    Take 1 tablet by mouth daily       topiramate 200 MG tablet    TOPAMAX    60 tablet    Take 1 tablet (200 mg) by mouth 2 times daily       TYLENOL 325 MG tablet   Generic drug:  acetaminophen      Take 325-650 mg by mouth as needed       vilazodone 40 MG Tabs tablet    VIIBRYD    30 tablet    Take 1 tablet (40 mg) by mouth daily       vitamin D 1000 UNITS capsule     30 capsule    3 tabs (3000 units) daily       * Notice:  This list has 4 medication(s) that are the same as other medications prescribed for you. Read the directions carefully, and ask your doctor or other care provider to review them with you.

## 2017-06-02 ENCOUNTER — OFFICE VISIT (OUTPATIENT)
Dept: PSYCHIATRY | Facility: CLINIC | Age: 62
End: 2017-06-02
Attending: PSYCHOLOGIST
Payer: MEDICARE

## 2017-06-02 DIAGNOSIS — F43.10 POSTTRAUMATIC STRESS DISORDER: ICD-10-CM

## 2017-06-02 DIAGNOSIS — F41.1 GENERALIZED ANXIETY DISORDER: ICD-10-CM

## 2017-06-02 DIAGNOSIS — F33.1 MAJOR DEPRESSIVE DISORDER, RECURRENT EPISODE, MODERATE (H): Primary | ICD-10-CM

## 2017-06-02 NOTE — MR AVS SNAPSHOT
After Visit Summary   6/2/2017    Elizabeth Palma    MRN: [id number]           Patient Information     Date Of Birth          1955        Visit Information        Provider Department      6/2/2017 10:00 AM Era Gonzalez, PhD LP Psychiatry Clinic        Today's Diagnoses     Major depressive disorder, recurrent episode, moderate (H)    -  1    Posttraumatic stress disorder        Generalized anxiety disorder           Follow-ups after your visit        Your next 10 appointments already scheduled     Jun 16, 2017  9:00 AM CDT   LAB with  LAB   [facility] ([facility])    [address] [place] [id number] [phone]           Patient must bring picture ID.  Patient should be prepared to give a urine specimen  Please do not eat 10-12 hours before your appointment if you are coming in fasting for labs on lipids, cholesterol, or glucose (sugar).  Pregnant women should follow their Care Team instructions. Water with medications is okay. Do not drink coffee or other fluids.   If you have concerns about taking  your medications, please ask at office or if scheduling via [employer], send a message by clicking on Secure Messaging, Message Your Care Team.            Jun 16, 2017  9:30 AM CDT   NUTRITION VISIT with Francesca Danielle RD   [facility] ([facility])    [address] [place] [address] [phone]            Jun 16, 2017 10:00 AM CDT   XR THORACIC SPINE 3 VIEWS with UCXR1   [facility] ([facility])    [address] [place] [id number] [phone]           Please bring a list of your current medicines to your exam. (Include vitamins, minerals and over-thecounter medicines.) Leave your valuables at home.  Tell your doctor if there is a chance you may be pregnant.  You do not need to do  anything special for this exam.            Jun 26, 2017 10:00 AM CDT   DBT Individual Therapy with Era Gonzalez, PhD    Psychiatry Clinic ([facility])    [address]  [address] [facility] [address] [phone]            Jul 11, 2017  1:00 PM CDT   Adult Med Follow UP with Chaparrita Hatch MD   [facility] ([facility])    [address]  [facility] [address] [phone]            Jul 14, 2017  9:00 AM CDT   LAB with  LAB   [facility] ([facility])    [address]  [address] [facility] [id number] [phone]           Patient must bring picture ID.  Patient should be prepared to give a urine specimen  Please do not eat 10-12 hours before your appointment if you are coming in fasting for labs on lipids, cholesterol, or glucose (sugar).  Pregnant women should follow their Care Team instructions. Water with medications is okay. Do not drink coffee or other fluids.   If you have concerns about taking  your medications, please ask at office or if scheduling via [employer], send a message by clicking on Secure Messaging, Message Your Care Team.            Jul 14, 2017  9:30 AM CDT   NUTRITION VISIT with Francesca Danielle RD   [facility] ([facility])    [address]  [address] [facility] [address] [phone]            Aug 18, 2017  9:15 AM CDT   (Arrive by 9:00 AM)   Return Visit with Prakash Irene MD   [facility] ([facility])    [address]  [address] [facility] [address] [phone]            Aug 21, 2017 10:00 AM CDT   Return Visit with Jaiver Tuttle DPM   [facility] ([facility])    [address] [place] [address] [phone]            Aug 25, 2017  9:30 AM CDT    NUTRITION VISIT with Francesca Danielle RD   [facility] ([facility])    [address]  [address] [facility] [address] [phone]              Who to contact     Please call your clinic at [phone] to:    Ask questions about your health    Make or cancel appointments    Discuss your medicines    Learn about your test results    Speak to your doctor   If you have compliments or concerns about an experience at your clinic, or if you wish to file a complaint, please contact [facility] Patient Relations at [phone] or email us at [email or web address]         Additional Information About Your Visit        SolidFireharRelayRides Information     CallMiner gives you secure access to your electronic health record. If you see a primary care provider, you can also send messages to your care team and make appointments. If you have questions, please call your primary care clinic.  If you do not have a primary care provider, please call [phone] and they will assist you.      CallMiner is an electronic gateway that provides easy, online access to your medical records. With CallMiner, you can request a clinic appointment, read your test results, renew a prescription or communicate with your care team.     To access your existing account, please contact your [facility] or call [phone] for assistance.        Care EveryWhere ID     This is your Care EveryWhere ID. This could be used by other organizations to access your [place] medical records  [id number]         Blood Pressure from Last 3 Encounters:   06/07/17 105/73   06/06/17 162/79   05/22/17 122/68    Weight from Last 3 Encounters:   06/07/17 79.4 kg (175 lb 1.6 oz)   06/06/17 78.3 kg (172 lb 9.6 oz)   05/22/17 79.9 kg (176 lb 1.6 oz)              Today, you had the following     No orders found for display         Today's Medication  Changes          These changes are accurate as of: 6/2/17 11:59 PM.  If you have any questions, ask your nurse or doctor.               These medicines have changed or have updated prescriptions.        Dose/Directions    econazole nitrate 1 % cream   This may have changed:    - when to take this  - reasons to take this   Used for:  Type II or unspecified type diabetes mellitus with neurological manifestations, not stated as uncontrolled, Dermatophytosis of foot        Apply topically daily   Quantity:  85 g   Refills:  3                Primary Care Provider Office Phone # Fax #    Horacio Sheridan [phone]        PHYSICIANS [address] [facility] [address]  [facility] [address]        Thank you!     Thank you for choosing PSYCHIATRY CLINIC  for your care. Our goal is always to provide you with excellent care. Hearing back from our patients is one way we can continue to improve our services. Please take a few minutes to complete the written survey that you may receive in the mail after your visit with us. Thank you!             Your Updated Medication List - Protect others around you: Learn how to safely use, store and throw away your medicines at www.disposemymeds.org.          This list is accurate as of: 6/2/17 11:59 PM.  Always use your most recent med list.                   Brand Name Dispense Instructions for use    acetylcysteine 600 MG Caps capsule    N-ACETYL CYSTEINE    30 capsule    Take 2 400 mg caps two times daily for total daily dose of 800 mg       albuterol 108 (90 BASE) MCG/ACT Inhaler    PROAIR HFA/PROVENTIL HFA/VENTOLIN HFA    1 Inhaler    Inhale 2 puffs into the lungs every 6 hours as needed for shortness of breath / dyspnea or wheezing       ARIPiprazole 2 MG tablet    ABILIFY    15 tablet    Take 0.5 tablets (1 mg) by mouth daily       aspirin 81 MG EC tablet     30 tablet    Take 1 tablet (81 mg) by mouth daily       BENADRYL 25 MG capsule   Generic drug:  diphenhydrAMINE      Take  25 mg by mouth At Bedtime.       blood glucose monitoring lancets     1 Box    Use to test blood sugar 2 times daily or as directed.       * blood glucose monitoring meter device kit    no brand specified    1 kit    Use to test blood sugar 2 times daily or as directed.       * blood glucose monitoring meter device kit          blood glucose monitoring test strip    no brand specified    100 strip    Use to test blood sugars 2 times daily or as directed       cyanocobalamin 1000 MCG tablet    vitamin  B-12    30 tablet    Take 1 tablet by mouth daily.       cycloSPORINE modified 25 MG capsule    GENERIC EQUIVALENT    240 capsule    Take 4 capsules (100 mg) by mouth 2 times daily       econazole nitrate 1 % cream     85 g    Apply topically daily       furosemide 20 MG tablet    LASIX    90 tablet    Take 1 tablet (20 mg) by mouth daily       latanoprost 0.005 % ophthalmic solution    XALATAN    2.5 mL    Place 1 drop into both eyes At Bedtime       metFORMIN 500 MG 24 hr tablet    GLUCOPHAGE-XR    90 tablet    Take 3 tablets (1,500 mg) by mouth daily (with dinner)       mycophenolate 250 MG capsule    CELLCEPT - GENERIC EQUIVALENT    240 capsule    Take 4 capsules (1,000 mg) by mouth 2 times daily       omeprazole 40 MG capsule    priLOSEC    90 capsule    Take 1 capsule (40 mg) by mouth daily       ondansetron 4 MG ODT tab    ZOFRAN-ODT    20 tablet    Take 1 tablet (4 mg) by mouth every 6 hours as needed       prazosin 5 MG capsule    MINIPRESS    90 capsule    Take 3 capsules (15 mg) by mouth At Bedtime       REFRESH OP      Apply to eye as needed Both eyes       simvastatin 20 MG tablet    ZOCOR    90 tablet    Take 1 tablet (20 mg) by mouth At Bedtime       sulfamethoxazole-trimethoprim 400-80 MG per tablet    BACTRIM/SEPTRA    90 tablet    Take 1 tablet by mouth daily       topiramate 200 MG tablet    TOPAMAX    60 tablet    Take 1 tablet (200 mg) by mouth 2 times daily       TYLENOL 325 MG tablet   Generic  drug:  acetaminophen      Take 325-650 mg by mouth as needed       vilazodone 40 MG Tabs tablet    VIIBRYD    30 tablet    Take 1 tablet (40 mg) by mouth daily       vitamin D 1000 UNITS capsule     30 capsule    2,000 Units       * Notice:  This list has 2 medication(s) that are the same as other medications prescribed for you. Read the directions carefully, and ask your doctor or other care provider to review them with you.

## 2017-06-03 ENCOUNTER — HEALTH MAINTENANCE LETTER (OUTPATIENT)
Age: 62
End: 2017-06-03

## 2017-06-06 ENCOUNTER — OFFICE VISIT (OUTPATIENT)
Dept: PSYCHIATRY | Facility: CLINIC | Age: 62
End: 2017-06-06
Attending: PSYCHOLOGIST
Payer: MEDICARE

## 2017-06-06 ENCOUNTER — OFFICE VISIT (OUTPATIENT)
Dept: DERMATOLOGY | Facility: CLINIC | Age: 62
End: 2017-06-06

## 2017-06-06 ENCOUNTER — OFFICE VISIT (OUTPATIENT)
Dept: PSYCHIATRY | Facility: CLINIC | Age: 62
End: 2017-06-06

## 2017-06-06 VITALS
DIASTOLIC BLOOD PRESSURE: 79 MMHG | WEIGHT: 172.6 LBS | HEIGHT: 60 IN | SYSTOLIC BLOOD PRESSURE: 162 MMHG | BODY MASS INDEX: 33.89 KG/M2 | HEART RATE: 80 BPM

## 2017-06-06 DIAGNOSIS — F33.1 MAJOR DEPRESSIVE DISORDER, RECURRENT EPISODE, MODERATE (H): Primary | ICD-10-CM

## 2017-06-06 DIAGNOSIS — F51.5 NIGHTMARES ASSOCIATED WITH CHRONIC POST-TRAUMATIC STRESS DISORDER: ICD-10-CM

## 2017-06-06 DIAGNOSIS — F43.10 POSTTRAUMATIC STRESS DISORDER: ICD-10-CM

## 2017-06-06 DIAGNOSIS — D84.9 IMMUNOSUPPRESSED STATUS (H): ICD-10-CM

## 2017-06-06 DIAGNOSIS — Z12.83 SCREENING EXAM FOR SKIN CANCER: ICD-10-CM

## 2017-06-06 DIAGNOSIS — Z94.0 DECEASED-DONOR KIDNEY TRANSPLANT: ICD-10-CM

## 2017-06-06 DIAGNOSIS — F43.12 NIGHTMARES ASSOCIATED WITH CHRONIC POST-TRAUMATIC STRESS DISORDER: ICD-10-CM

## 2017-06-06 DIAGNOSIS — L82.1 SK (SEBORRHEIC KERATOSIS): Primary | ICD-10-CM

## 2017-06-06 DIAGNOSIS — L85.3 XEROSIS CUTIS: ICD-10-CM

## 2017-06-06 DIAGNOSIS — F41.1 GENERALIZED ANXIETY DISORDER: ICD-10-CM

## 2017-06-06 DIAGNOSIS — F41.9 ANXIETY: ICD-10-CM

## 2017-06-06 ASSESSMENT — PAIN SCALES - GENERAL: PAINLEVEL: NO PAIN (0)

## 2017-06-06 NOTE — MR AVS SNAPSHOT
After Visit Summary   6/6/2017    Elizabeth Palma    MRN: [id number]           Patient Information     Date Of Birth          1955        Visit Information        Provider Department      6/6/2017 10:30 AM Era Gonzalez, PhD LP [facility]        Today's Diagnoses     Major depressive disorder, recurrent episode, moderate (H)    -  1    Posttraumatic stress disorder        Generalized anxiety disorder           Follow-ups after your visit        Your next 10 appointments already scheduled     Jun 16, 2017  9:00 AM CDT   LAB with  LAB   [facility] ([facility])    [address] [place] [id number] [phone]           Patient must bring picture ID.  Patient should be prepared to give a urine specimen  Please do not eat 10-12 hours before your appointment if you are coming in fasting for labs on lipids, cholesterol, or glucose (sugar).  Pregnant women should follow their Care Team instructions. Water with medications is okay. Do not drink coffee or other fluids.   If you have concerns about taking  your medications, please ask at office or if scheduling via [employer], send a message by clicking on Secure Messaging, Message Your Care Team.            Jun 16, 2017  9:30 AM CDT   NUTRITION VISIT with Francesca Danielle RD   [facility] ([facility])    [address] [place] [id number] [phone]            Jun 16, 2017 10:00 AM CDT   XR THORACIC SPINE 3 VIEWS with UCXR1   [facility] ([facility])    [address] [place] [id number] [phone]           Please bring a list of your current medicines to your exam. (Include vitamins, minerals and over-thecounter medicines.) Leave your valuables at home.  Tell your doctor if there is a chance you may be pregnant.  You do not need to do  anything special for this exam.            Jun 26, 2017 10:00 AM CDT   DBT Individual Therapy with Era Gonzalez, PhD    Psychiatry Clinic ([facility])    [address]  [address] [facility] [id number] [phone]            Jul 11, 2017  1:00 PM CDT   Adult Med Follow UP with Chaparrita Hatch MD   [facility] ([facility])    [address]  [facility] [id number] [phone]            Jul 14, 2017  9:00 AM CDT   LAB with  LAB   [facility] ([facility] [place])    [address]  [address] [facility] [id number] [phone]           Patient must bring picture ID.  Patient should be prepared to give a urine specimen  Please do not eat 10-12 hours before your appointment if you are coming in fasting for labs on lipids, cholesterol, or glucose (sugar).  Pregnant women should follow their Care Team instructions. Water with medications is okay. Do not drink coffee or other fluids.   If you have concerns about taking  your medications, please ask at office or if scheduling via [employer], send a message by clicking on Secure Messaging, Message Your Care Team.            Jul 14, 2017  9:30 AM CDT   NUTRITION VISIT with Francesca Danielle RD   [facility] ([facility] [place])    [address]  [address] [facility] [id number] [phone]            Aug 18, 2017  9:15 AM CDT   (Arrive by 9:00 AM)   Return Visit with Prakash Irene MD   [facility] ([facility] [place])    [address]  [address] [facility] [id number] [phone]            Aug 21, 2017 10:00 AM CDT   Return Visit with Javier Tuttle DPM   [facility] ([facility])    [id number] [place] [id number] [phone]            Aug 25, 2017  9:30 AM CDT    NUTRITION VISIT with Francesca Danielle RD   [facility] ([facility])    [address]  [address] [facility] [address] [phone]              Who to contact     Please call your clinic at [phone] to:    Ask questions about your health    Make or cancel appointments    Discuss your medicines    Learn about your test results    Speak to your doctor   If you have compliments or concerns about an experience at your clinic, or if you wish to file a complaint, please contact [facility] Patient Relations at [phone] or email us at [email or web address]         Additional Information About Your Visit        [employer] Information     [employer] gives you secure access to your electronic health record. If you see a primary care provider, you can also send messages to your care team and make appointments. If you have questions, please call your primary care clinic.  If you do not have a primary care provider, please call [phone] and they will assist you.      [employer] is an electronic gateway that provides easy, online access to your medical records. With [employer], you can request a clinic appointment, read your test results, renew a prescription or communicate with your care team.     To access your existing account, please contact your [facility] or call [phone] for assistance.        Care EveryWhere ID     This is your Care EveryWhere ID. This could be used by other organizations to access your [place] medical records  [id number]         Blood Pressure from Last 3 Encounters:   06/07/17 105/73   06/06/17 162/79   05/22/17 122/68    Weight from Last 3 Encounters:   06/07/17 79.4 kg (175 lb 1.6 oz)   06/06/17 78.3 kg (172 lb 9.6 oz)   05/22/17 79.9 kg (176 lb 1.6 oz)              Today, you had the following     No orders found for display         Today's Medication  Changes          These changes are accurate as of: 6/6/17 11:59 PM.  If you have any questions, ask your nurse or doctor.               These medicines have changed or have updated prescriptions.        Dose/Directions    cyanocobalamin 1000 MCG tablet   Commonly known as:  vitamin  B-12   This may have changed:  when to take this   Used for:  CKD (chronic kidney disease) stage 5, GFR less than 15 ml/min (H)        Dose:  1000 mcg   Take 1 tablet by mouth daily.   Quantity:  30 tablet   Refills:  0       econazole nitrate 1 % cream   This may have changed:    - when to take this  - reasons to take this   Used for:  Type II or unspecified type diabetes mellitus with neurological manifestations, not stated as uncontrolled, Dermatophytosis of foot        Apply topically daily   Quantity:  85 g   Refills:  3                Primary Care Provider Office Phone # Fax #    Horacio Sheridan [phone]        [facility] [address] [facility] [address]  [facility] [address]        Thank you!     Thank you for choosing PSYCHIATRY CLINIC  for your care. Our goal is always to provide you with excellent care. Hearing back from our patients is one way we can continue to improve our services. Please take a few minutes to complete the written survey that you may receive in the mail after your visit with us. Thank you!             Your Updated Medication List - Protect others around you: Learn how to safely use, store and throw away your medicines at www.disposemymeds.org.          This list is accurate as of: 6/6/17 11:59 PM.  Always use your most recent med list.                   Brand Name Dispense Instructions for use    acetylcysteine 600 MG Caps capsule    N-ACETYL CYSTEINE    30 capsule    Take 2 400 mg caps two times daily for total daily dose of 800 mg       albuterol 108 (90 BASE) MCG/ACT Inhaler    PROAIR HFA/PROVENTIL HFA/VENTOLIN HFA    1 Inhaler    Inhale 2 puffs into the lungs every 6 hours as needed for  shortness of breath / dyspnea or wheezing       ARIPiprazole 2 MG tablet    ABILIFY    15 tablet    Take 0.5 tablets (1 mg) by mouth daily       aspirin 81 MG EC tablet     30 tablet    Take 1 tablet (81 mg) by mouth daily       BENADRYL 25 MG capsule   Generic drug:  diphenhydrAMINE      Take 25 mg by mouth At Bedtime.       blood glucose monitoring lancets     1 Box    Use to test blood sugar 2 times daily or as directed.       * blood glucose monitoring meter device kit    no brand specified    1 kit    Use to test blood sugar 2 times daily or as directed.       * blood glucose monitoring meter device kit          blood glucose monitoring test strip    no brand specified    100 strip    Use to test blood sugars 2 times daily or as directed       cyanocobalamin 1000 MCG tablet    vitamin  B-12    30 tablet    Take 1 tablet by mouth daily.       cycloSPORINE modified 25 MG capsule    GENERIC EQUIVALENT    240 capsule    Take 4 capsules (100 mg) by mouth 2 times daily       econazole nitrate 1 % cream     85 g    Apply topically daily       furosemide 20 MG tablet    LASIX    90 tablet    Take 1 tablet (20 mg) by mouth daily       latanoprost 0.005 % ophthalmic solution    XALATAN    2.5 mL    Place 1 drop into both eyes At Bedtime       metFORMIN 500 MG 24 hr tablet    GLUCOPHAGE-XR    90 tablet    Take 3 tablets (1,500 mg) by mouth daily (with dinner)       mycophenolate 250 MG capsule    CELLCEPT - GENERIC EQUIVALENT    240 capsule    Take 4 capsules (1,000 mg) by mouth 2 times daily       omeprazole 40 MG capsule    priLOSEC    90 capsule    Take 1 capsule (40 mg) by mouth daily       ondansetron 4 MG ODT tab    ZOFRAN-ODT    20 tablet    Take 1 tablet (4 mg) by mouth every 6 hours as needed       prazosin 5 MG capsule    MINIPRESS    90 capsule    Take 3 capsules (15 mg) by mouth At Bedtime       REFRESH OP      Apply to eye as needed Both eyes       simvastatin 20 MG tablet    ZOCOR    90 tablet    Take 1  tablet (20 mg) by mouth At Bedtime       sulfamethoxazole-trimethoprim 400-80 MG per tablet    BACTRIM/SEPTRA    90 tablet    Take 1 tablet by mouth daily       topiramate 200 MG tablet    TOPAMAX    60 tablet    Take 1 tablet (200 mg) by mouth 2 times daily       TYLENOL 325 MG tablet   Generic drug:  acetaminophen      Take 325-650 mg by mouth as needed       vilazodone 40 MG Tabs tablet    VIIBRYD    30 tablet    Take 1 tablet (40 mg) by mouth daily       vitamin D 1000 UNITS capsule     30 capsule    2,000 Units       * Notice:  This list has 2 medication(s) that are the same as other medications prescribed for you. Read the directions carefully, and ask your doctor or other care provider to review them with you.

## 2017-06-06 NOTE — PATIENT INSTRUCTIONS
Start a daily moisturizer to face, chest, forearms with an SPF 30  Examples  would include:  Cerave AM  Oil of olay complete all day  Neutrogena daily
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
## 2017-06-06 NOTE — MR AVS SNAPSHOT
After Visit Summary   6/6/2017    Elizabeth Palma    MRN: [id number]           Patient Information     Date Of Birth          1955        Visit Information        Provider Department      6/6/2017 1:00 PM Chaparrita Hatch MD [facility] Psychiatry         Follow-ups after your visit        Follow-up notes from your care team     Return in about 4 weeks (around 7/4/2017) for 30 min. MFU.      Your next 10 appointments already scheduled     Jun 07, 2017 10:00 AM CDT   New Patient Visit with GERHARD King CNM   [facility] ([facility])    [facility] [address]  [address]  [address]  [facility] [id number] [phone]            Jun 16, 2017  9:00 AM CDT   LAB with SHANNAN LAB   [facility] ([facility])    [address] [place] [address] [phone]           Patient must bring picture ID.  Patient should be prepared to give a urine specimen  Please do not eat 10-12 hours before your appointment if you are coming in fasting for labs on lipids, cholesterol, or glucose (sugar).  Pregnant women should follow their Care Team instructions. Water with medications is okay. Do not drink coffee or other fluids.   If you have concerns about taking  your medications, please ask at office or if scheduling via Securenshart, send a message by clicking on Secure Messaging, Message Your Care Team.            Jun 16, 2017  9:30 AM CDT   NUTRITION VISIT with Francesca Danielle RD   [facility] ([facility])    [address] [place] [address]  4th [facility] [address] [phone]            Jun 16, 2017 10:00 AM CDT   XR THORACIC SPINE 3 VIEWS with UCXR1   [facility] ([facility])    [address] [place] [address]  1st [facility] [address] [phone]           Please bring a list of your current medicines to  your exam. (Include vitamins, minerals and over-thecounter medicines.) Leave your valuables at home.  Tell your doctor if there is a chance you may be pregnant.  You do not need to do anything special for this exam.            Jun 26, 2017 10:00 AM CDT   DBT Individual Therapy with Era Gonzalez, PhD    Psychiatry Clinic ([facility])    [address]  [address] [facility] [address]   [phone]            Jul 11, 2017  1:00 PM CDT   Adult Med Follow UP with Chaparrita Hatch MD   [facility] ([facility])    [address]  [facility] [address] [phone]            Jul 14, 2017  9:00 AM CDT   LAB with  LAB    Health Lab ([facility])    [address] [place] [address]  1st [facility] [id number] [phone]           Patient must bring picture ID.  Patient should be prepared to give a urine specimen  Please do not eat 10-12 hours before your appointment if you are coming in fasting for labs on lipids, cholesterol, or glucose (sugar).  Pregnant women should follow their Care Team instructions. Water with medications is okay. Do not drink coffee or other fluids.   If you have concerns about taking  your medications, please ask at office or if scheduling via [facility], send a message by clicking on Secure Messaging, Message Your Care Team.            Jul 14, 2017  9:30 AM CDT   NUTRITION VISIT with Francesca Danielle RD   [facility] ([facility])    [address] [place] [address]  4th [facility] [address]   [phone]            Aug 18, 2017  9:15 AM CDT   (Arrive by 9:00 AM)   Return Visit with Prakash Irene MD   [facility] ([facility])    [address] [place] [address]   [phone]            Aug 21, 2017 10:00 AM CDT   Return Visit with  Javier Tuttle DPM   [facility] ([facility])    [address] [place] [address] [phone]              Who to contact     Please call your clinic at [phone] to:    Ask questions about your health    Make or cancel appointments    Discuss your medicines    Learn about your test results    Speak to your doctor   If you have compliments or concerns about an experience at your clinic, or if you wish to file a complaint, please contact [facility] Patient Relations at [phone] or email us at [email or web address]         Additional Information About Your Visit        DeltagenharLiquidPlanner Information     marinanow gives you secure access to your electronic health record. If you see a primary care provider, you can also send messages to your care team and make appointments. If you have questions, please call your primary care clinic.  If you do not have a primary care provider, please call [phone] and they will assist you.      marinanow is an electronic gateway that provides easy, online access to your medical records. With marinanow, you can request a clinic appointment, read your test results, renew a prescription or communicate with your care team.     To access your existing account, please contact your [facility] Clinic or call [phone] for assistance.        Care EveryWhere ID     This is your Care EveryWhere ID. This could be used by other organizations to access your [facility] medical records  [id number]        Your Vitals Were     Pulse Height BMI (Body Mass Index)             80 5' (152.4 cm) 33.71 kg/m2          Blood Pressure from Last 3 Encounters:   06/06/17 162/79   05/22/17 122/68   05/15/17 118/77    Weight from Last 3 Encounters:   06/06/17 172 lb 9.6 oz (78.3 kg)   05/22/17 176 lb 1.6 oz (79.9 kg)   05/15/17 175 lb 12.8 oz (79.7 kg)              Today, you had the following     No orders  found for display         Today's Medication Changes          These changes are accurate as of: 6/6/17  1:58 PM.  If you have any questions, ask your nurse or doctor.               These medicines have changed or have updated prescriptions.        Dose/Directions    cyanocobalamin 1000 MCG tablet   Commonly known as:  vitamin  B-12   This may have changed:  when to take this   Used for:  CKD (chronic kidney disease) stage 5, GFR less than 15 ml/min (H)        Dose:  1000 mcg   Take 1 tablet by mouth daily.   Quantity:  30 tablet   Refills:  0       econazole nitrate 1 % cream   This may have changed:    - when to take this  - reasons to take this   Used for:  Type II or unspecified type diabetes mellitus with neurological manifestations, not stated as uncontrolled, Dermatophytosis of foot        Apply topically daily   Quantity:  85 g   Refills:  3                Primary Care Provider Office Phone # Fax #    Horacio Sheridan [phone]        [facility] [address] [facility] [address]  [facility] [address]        Thank you!     Thank you for choosing [facility]  for your care. Our goal is always to provide you with excellent care. Hearing back from our patients is one way we can continue to improve our services. Please take a few minutes to complete the written survey that you may receive in the mail after your visit with us. Thank you!             Your Updated Medication List - Protect others around you: Learn how to safely use, store and throw away your medicines at www.disposemymeds.org.          This list is accurate as of: 6/6/17  1:58 PM.  Always use your most recent med list.                   Brand Name Dispense Instructions for use    acetylcysteine 600 MG Caps capsule    N-ACETYL CYSTEINE    30 capsule    Take 2 400 mg caps two times daily for total daily dose of 800 mg       albuterol 108 (90 BASE) MCG/ACT Inhaler    PROAIR HFA/PROVENTIL HFA/VENTOLIN HFA    1 Inhaler    Inhale 2 puffs into  the lungs every 6 hours as needed for shortness of breath / dyspnea or wheezing       ARIPiprazole 2 MG tablet    ABILIFY    15 tablet    Take 0.5 tablets (1 mg) by mouth daily       aspirin 81 MG EC tablet     30 tablet    Take 1 tablet (81 mg) by mouth daily       BENADRYL 25 MG capsule   Generic drug:  diphenhydrAMINE      Take 25 mg by mouth At Bedtime.       blood glucose monitoring lancets     1 Box    Use to test blood sugar 2 times daily or as directed.       * blood glucose monitoring meter device kit    no brand specified    1 kit    Use to test blood sugar 2 times daily or as directed.       * blood glucose monitoring meter device kit          blood glucose monitoring test strip    no brand specified    100 strip    Use to test blood sugars 2 times daily or as directed       cyanocobalamin 1000 MCG tablet    vitamin  B-12    30 tablet    Take 1 tablet by mouth daily.       cycloSPORINE modified 25 MG capsule    GENERIC EQUIVALENT    240 capsule    Take 4 capsules (100 mg) by mouth 2 times daily       econazole nitrate 1 % cream     85 g    Apply topically daily       furosemide 20 MG tablet    LASIX    90 tablet    Take 1 tablet (20 mg) by mouth daily       latanoprost 0.005 % ophthalmic solution    XALATAN    2.5 mL    Place 1 drop into both eyes At Bedtime       metFORMIN 500 MG 24 hr tablet    GLUCOPHAGE-XR    90 tablet    Take 3 tablets (1,500 mg) by mouth daily (with dinner)       mycophenolate 250 MG capsule    CELLCEPT - GENERIC EQUIVALENT    240 capsule    Take 4 capsules (1,000 mg) by mouth 2 times daily       omeprazole 40 MG capsule    priLOSEC    90 capsule    Take 1 capsule (40 mg) by mouth daily       ondansetron 4 MG ODT tab    ZOFRAN-ODT    20 tablet    Take 1 tablet (4 mg) by mouth every 6 hours as needed       prazosin 5 MG capsule    MINIPRESS    90 capsule    Take 3 capsules (15 mg) by mouth At Bedtime       REFRESH OP      Apply to eye as needed Both eyes       simvastatin 20 MG  tablet    ZOCOR    90 tablet    Take 1 tablet (20 mg) by mouth At Bedtime       sulfamethoxazole-trimethoprim 400-80 MG per tablet    BACTRIM/SEPTRA    90 tablet    Take 1 tablet by mouth daily       topiramate 200 MG tablet    TOPAMAX    60 tablet    Take 1 tablet (200 mg) by mouth 2 times daily       TYLENOL 325 MG tablet   Generic drug:  acetaminophen      Take 325-650 mg by mouth as needed       vilazodone 40 MG Tabs tablet    VIIBRYD    30 tablet    Take 1 tablet (40 mg) by mouth daily       vitamin D 1000 UNITS capsule     30 capsule    2,000 Units       * Notice:  This list has 2 medication(s) that are the same as other medications prescribed for you. Read the directions carefully, and ask your doctor or other care provider to review them with you.

## 2017-06-06 NOTE — LETTER
"2017       RE: Elizabeth Palma  [address]     [facility] [address]     Dear Colleague,    Thank you for referring your patient, Elizabeth Palma, to the [facility] DERMATOLOGY at [facility]. Please see a copy of my visit note below.    [facility] Dermatology Note      Dermatology Problem List:  1. SKs  2. Benign nevi  3. xerosis  4. Renal transplant 3/20/14 (CSA, MMF)    Encounter Date: 2017    CC:   Chief Complaint   Patient presents with     Derm Problem     Elizabeth is here today for a skin check. Elizabeth notes\" No concerns\"       History of Present Illness:  Ms. Elizabeth Palma is a 61 year old female who presents as a follow-up for transplant skin check. The patient was last seen 3/24/2015 by Dr. Colt Kinney. No areas of concern. No bleeding, growing, changing, painful lesions. No history of skin cancers, has not needed biopsies in the past. Uses sunscreen SPF 75 when she goes outside. Grew up in [place], history of <5 blistering sun burns, [place] the [place]. No tanning bed use, but did use baby oil to tan as a kid.    Past Medical History:   Patient Active Problem List   Diagnosis     Autosomal dominant polycystic kidney disease     Hypertension     Hyperlipidemia     Abnormal MRI, cervical spine     Sensory loss     Premature menopause age 37     OP (osteoporosis) T score -3.8     LION on CPAP     Major depressive disorder, recurrent episode, moderate (H)     Generalized anxiety disorder     CMC DJD(carpometacarpal degenerative joint disease), localized primary     Pain in joint, forearm -- L unhealed Fx     -donor kidney transplant     Immunosuppressed status (H)     Hyperparathyroidism, secondary (H)     Hyperparathyroidism (H)     Senile osteoporosis     Pain in joint involving ankle and foot     Nightmares associated with chronic post-traumatic stress disorder     Type 2 diabetes mellitus with peripheral " neuropathy (H)     Posttraumatic stress disorder     Right knee pain     Left knee pain     Age-related osteoporosis without current pathological fracture     Past Medical History:   Diagnosis Date     Abnormal MRI, cervical spine 10/15/2011    2011; mild changes noted. Study done for left arm symptoms Impression:  1. Mild multilevel degenerative disc disease with no significant canal or neural stenosis seen. motion artifact on the STIR images in these are not interpretable. The remaining images were interpreted      Autosomal dominant polycystic kidney disease 2011     (Problem list name updated by automated process. Provider to review and confirm.)     CMC DJD(carpometacarpal degenerative joint disease), localized primary 3/5/2013     -donor kidney transplant 3/20/2014     DM type 2 (diabetes mellitus, type 2) (H) 2013     Encounter for long-term (current) use of other medications 2015     Family history of tremor 10/17/2011     Generalized anxiety disorder 11/15/2012     Glaucoma      Hyperlipidemia 10/15/2011     Hyperparathyroidism, secondary (H) 2015     Hypertension      Immunosuppressed status (H) 3/20/2014     Major depressive disorder, recurrent episode, moderate (H) 11/15/2012     Obesity (BMI 30-39.9)      OP (osteoporosis) T score -3.8 2009 T-score -3.7      LION (obstructive sleep apnoea) 10/15/2012    intol to cpa     Pain in joint, forearm -- L unhealed Fx 2013     Premature menopause age 37 7/10/2012    OCP (vaginal bldg)-->HT which she stopped 2 mo later documented at 2007 visit (age 51).      Restless leg syndrome      Rib fractures 2013     Sensory loss 10/17/2011    Bottom of feet; uncertain if there is a neuropathy per notes.       Stiffness of joint, not elsewhere classified, hand 3/5/2013     Tremor 10/15/2011    head     Past Surgical History:   Procedure Laterality Date     C TRANSPLANTATION OF KIDNEY  3/2014     CHOLECYSTECTOMY   1990     ESOPHAGOSCOPY, GASTROSCOPY, DUODENOSCOPY (EGD), COMBINED N/A 5/19/2015    Procedure: COMBINED ESOPHAGOSCOPY, GASTROSCOPY, DUODENOSCOPY (EGD);  Surgeon: Sky Davey MD;  Location:  GI     ESOPHAGOSCOPY, GASTROSCOPY, DUODENOSCOPY (EGD), COMBINED N/A 5/19/2015    Procedure: COMBINED ESOPHAGOSCOPY, GASTROSCOPY, DUODENOSCOPY (EGD), BIOPSY SINGLE OR MULTIPLE;  Surgeon: Sky Davey MD;  Location: [facility]     LAPAROSCOPY, SURGICAL; REPAIR INCISIONAL OR VENTRAL HERNIA       HERMINIA EN Y BOWEL  1990       Social History:  The patient lives in [place]. The patient denies use of tanning beds.    Family History:  There is no family history of skin cancer.    Medications:  Current Outpatient Prescriptions   Medication Sig Dispense Refill     furosemide (LASIX) 20 MG tablet Take 1 tablet (20 mg) by mouth daily 90 tablet 3     blood glucose monitoring (ACCU-CHEK RALPH PLUS) meter device kit   0     prazosin (MINIPRESS) 5 MG capsule Take 3 capsules (15 mg) by mouth At Bedtime 90 capsule 3     ARIPiprazole (ABILIFY) 2 MG tablet Take 0.5 tablets (1 mg) by mouth daily 15 tablet 3     vilazodone (VIIBRYD) 40 MG TABS tablet Take 1 tablet (40 mg) by mouth daily 30 tablet 3     omeprazole (PRILOSEC) 40 MG capsule Take 1 capsule (40 mg) by mouth daily 90 capsule 3     cycloSPORINE modified (GENERIC EQUIVALENT) 25 MG capsule Take 4 capsules (100 mg) by mouth 2 times daily 240 capsule 6     simvastatin (ZOCOR) 20 MG tablet Take 1 tablet (20 mg) by mouth At Bedtime 90 tablet 3     Polyvinyl Alcohol-Povidone (REFRESH OP) Apply to eye as needed Both eyes       latanoprost (XALATAN) 0.005 % ophthalmic solution Place 1 drop into both eyes At Bedtime 2.5 mL 11     blood glucose monitoring (NO BRAND SPECIFIED) meter device kit Use to test blood sugar 2 times daily or as directed. 1 kit 0     blood glucose monitoring (NO BRAND SPECIFIED) test strip Use to test blood sugars 2 times daily or as directed 100 strip 11      blood glucose monitoring (SOFTCLIX) lancets Use to test blood sugar 2 times daily or as directed. 1 Box 11     topiramate (TOPAMAX) 200 MG tablet Take 1 tablet (200 mg) by mouth 2 times daily 60 tablet 5     sulfamethoxazole-trimethoprim (BACTRIM/SEPTRA) 400-80 MG per tablet Take 1 tablet by mouth daily 90 tablet 3     mycophenolate (CELLCEPT - GENERIC EQUIVALENT) 250 MG capsule Take 4 capsules (1,000 mg) by mouth 2 times daily 240 capsule 11     gabapentin (NEURONTIN) 600 MG tablet Take 0.5 tablets (300 mg) by mouth daily At evening 90 tablet 1     gabapentin (NEURONTIN) 300 MG capsule Take 1 capsule (300 mg) by mouth At Bedtime 90 capsule 3     metFORMIN (GLUCOPHAGE-XR) 500 MG 24 hr tablet Take 3 tablets (1,500 mg) by mouth daily (with dinner) 90 tablet 11     acetylcysteine (N-ACETYL-L-CYSTEINE) 600 MG CAPS capsule Take 2 400 mg caps two times daily for total daily dose of 800 mg 30 capsule      ondansetron (ZOFRAN-ODT) 4 MG disintegrating tablet Take 1 tablet (4 mg) by mouth every 6 hours as needed 20 tablet 3     albuterol (PROAIR HFA, PROVENTIL HFA, VENTOLIN HFA) 108 (90 BASE) MCG/ACT inhaler Inhale 2 puffs into the lungs every 6 hours as needed for shortness of breath / dyspnea or wheezing 1 Inhaler 11     Cholecalciferol (VITAMIN D) 1000 UNITS capsule 3 tabs (3000 units) daily 30 capsule      econazole nitrate 1 % cream Apply topically daily (Patient taking differently: Apply topically as needed ) 85 g 3     aspirin EC 81 MG EC tablet Take 1 tablet (81 mg) by mouth daily 30 tablet 5     acetaminophen (TYLENOL) 325 MG tablet Take 325-650 mg by mouth as needed       cyanocolbalamin (VITAMIN  B-12) 1000 MCG tablet Take 1 tablet by mouth daily. 30 tablet 0     diphenhydrAMINE (BENADRYL) 25 MG capsule Take 25 mg by mouth At Bedtime.          Allergies   Allergen Reactions     Percocet [Oxycodone-Acetaminophen] Nausea and Vomiting     Novocain [Procaine Hcl] Hives     Had reaction 25 years ago to old renetta. Pt  reports multiple injections of lidocaine since then without reaction.  Tolerated lidocaine injection today without difficulty.  Osmar Mark MD IR Service.         Review of Systems:  -Not pertinent to the current visit.  -Constitutional: The patient denies fatigue, fevers, chills, unintended weight loss, and night sweats.  -HEENT: Patient denies nonhealing oral sores.  -Skin: As above in HPI. No additional skin concerns.    Physical exam:  Vitals: There were no vitals taken for this visit.  GEN: This is a well developed, well-nourished female in no acute distress, in a pleasant mood.    SKIN: Full skin, which includes the head/face, both arms, chest, back, abdomen,both legs, genitalia and/or groin buttocks, digits and/or nails, was examined.  -There are waxy stuck on tan to brown papules on the L temple, back.  -Multiple regular brown pigmented macules and papules are identified on the cheeks, shoulders, back all <4mm in diameter with homogenous dark brown appearance.   -There is xerosis of the lower legs and feet.   -No other lesions of concern on areas examined.     Impression/Plan:  1. Xerosis    Recommendation for daily moisturization with gentle keratolytics such as ammonium lactate, sal acid, or urea. Recommendations given to different brands    2. History of renal transplantation on CSA, MMF    ABCDs of melanoma and warning signs of NMSC were discussed and self skin checks were advised. , Sun precaution was advised including the use of sun screens of SPF 30 or higher, sun protective clothing, and avoidance of tanning beds.    Reviewed that risk of skin cancers increase after 5 years post transplant    Recommended calling back to be seen asap if she notices any new lesions or changing lesions    3. SKs  - benign; reassurance    Follow-up in 1 year, earlier for new or changing lesions.     Dr. Mcduffie staffed the patient.    Staff Involved:  Resident(Elda Olivares)/Staff(as above)    Staff Physician  Comments:   I saw and evaluated the patient with the resident and I agree with the assessment and plan.  I was present for the examination.    Lonny Mcduffie MD  Dermatology Staff Physician  , Department of Dermatology

## 2017-06-06 NOTE — MR AVS SNAPSHOT
After Visit Summary   6/6/2017    Elizabeth Palma    MRN: [id number]           Patient Information     Date Of Birth          1955        Visit Information        Provider Department      6/6/2017 9:15 AM Lonny Mcduffie MD [facility]        Care Instructions    Start a daily moisturizer to face, chest, forearms with an SPF 30  Examples  would include:  Cerave AM  Oil of olay complete all day  Neutrogena daily                        Follow-ups after your visit        Follow-up notes from your care team     Return in about 1 year (around 6/6/2018).      Your next 10 appointments already scheduled     Jun 06, 2017  1:00 PM CDT   Adult Med Follow UP with Chaparrita Hatch MD   [facility] ([facility])    [address]  [facility] [address]   [phone]            Jun 07, 2017 10:00 AM CDT   New Patient Visit with GERHARD King CNM   [facility] ([facility])    [facility] [employer] [address] [employer] [address]  [address]  [facility] [address] [phone]            Jun 16, 2017  9:00 AM CDT   LAB with  LAB   [facility] ([facility])    [address]  1st [address]  [facility] [id number] [phone]           Patient must bring picture ID.  Patient should be prepared to give a urine specimen  Please do not eat 10-12 hours before your appointment if you are coming in fasting for labs on lipids, cholesterol, or glucose (sugar).  Pregnant women should follow their Care Team instructions. Water with medications is okay. Do not drink coffee or other fluids.   If you have concerns about taking  your medications, please ask at office or if scheduling via [employer], send a message by clicking on Secure Messaging, Message Your Care Team.            Jun 16, 2017  9:30 AM CDT   NUTRITION VISIT with Francesca Danielle RD   [facility] ([facility])    [address] [address]   [phone]            Jun 16, 2017 10:00 AM CDT   XR THORACIC SPINE 3 VIEWS with UCXR1   [facility] ([facility])    [address] [place] [id number] [phone]           Please bring a list of your current medicines to your exam. (Include vitamins, minerals and over-thecounter medicines.) Leave your valuables at home.  Tell your doctor if there is a chance you may be pregnant.  You do not need to do anything special for this exam.            Jun 26, 2017 10:00 AM CDT   DBT Individual Therapy with Era Gonzalez, PhD    Psychiatry Clinic ([facility])    [place] [id number]  [address] [id number] [phone]            Jul 14, 2017  9:00 AM CDT   LAB with  LAB   [facility] ([facility])    [address] [place] [id number] [phone]           Patient must bring picture ID.  Patient should be prepared to give a urine specimen  Please do not eat 10-12 hours before your appointment if you are coming in fasting for labs on lipids, cholesterol, or glucose (sugar).  Pregnant women should follow their Care Team instructions. Water with medications is okay. Do not drink coffee or other fluids.   If you have concerns about taking  your medications, please ask at office or if scheduling via [employer], send a message by clicking on Secure Messaging, Message Your Care Team.            Jul 14, 2017  9:30 AM CDT   NUTRITION VISIT with Francesca Danielle RD   [facility] ([facility] [place])    [address] [place] [address]   [phone]            Aug 18, 2017  9:15 AM CDT   (Arrive by 9:00 AM)   Return Visit with Prakash Irene MD   [facility] Medical [facility] Management  ([facility])    [address]  [address] [facility] [id number] [phone]            Aug 21, 2017 10:00 AM CDT   Return Visit with Javier Tuttle DPM   [facility] ([facility])    [address] [place] [address] [phone]              Who to contact     Please call your clinic at [phone] to:    Ask questions about your health    Make or cancel appointments    Discuss your medicines    Learn about your test results    Speak to your doctor   If you have compliments or concerns about an experience at your clinic, or if you wish to file a complaint, please contact [facility] Patient Relations at [phone] or email us at [email or web address]         Additional Information About Your Visit        [employer] Information     [employer] gives you secure access to your electronic health record. If you see a primary care provider, you can also send messages to your care team and make appointments. If you have questions, please call your primary care clinic.  If you do not have a primary care provider, please call [phone] and they will assist you.      [employer] is an electronic gateway that provides easy, online access to your medical records. With [employer], you can request a clinic appointment, read your test results, renew a prescription or communicate with your care team.     To access your existing account, please contact your [facility] Clinic or call [phone] for assistance.        Care EveryWhere ID     This is your Care EveryWhere ID. This could be used by other organizations to access your [facility] medical records  [id number]         Blood Pressure from Last 3 Encounters:   05/22/17 122/68   05/15/17 118/77   05/12/17 105/73    Weight from Last 3 Encounters:   05/22/17 79.9 kg (176 lb 1.6 oz)   05/15/17 79.7 kg (175 lb 12.8 oz)   05/02/17 79.4 kg (175  lb)              Today, you had the following     No orders found for display         Today's Medication Changes          These changes are accurate as of: 6/6/17  9:34 AM.  If you have any questions, ask your nurse or doctor.               These medicines have changed or have updated prescriptions.        Dose/Directions    econazole nitrate 1 % cream   This may have changed:    - when to take this  - reasons to take this   Used for:  Type II or unspecified type diabetes mellitus with neurological manifestations, not stated as uncontrolled, Dermatophytosis of foot        Apply topically daily   Quantity:  85 g   Refills:  3                Primary Care Provider Office Phone # Fax #    Horacio Sheridan [phone]        [facility] [address]  [facility] [address]        Thank you!     Thank you for choosing [facility]  for your care. Our goal is always to provide you with excellent care. Hearing back from our patients is one way we can continue to improve our services. Please take a few minutes to complete the written survey that you may receive in the mail after your visit with us. Thank you!             Your Updated Medication List - Protect others around you: Learn how to safely use, store and throw away your medicines at www.disposemymeds.org.          This list is accurate as of: 6/6/17  9:34 AM.  Always use your most recent med list.                   Brand Name Dispense Instructions for use    acetylcysteine 600 MG Caps capsule    N-ACETYL CYSTEINE    30 capsule    Take 2 400 mg caps two times daily for total daily dose of 800 mg       albuterol 108 (90 BASE) MCG/ACT Inhaler    PROAIR HFA/PROVENTIL HFA/VENTOLIN HFA    1 Inhaler    Inhale 2 puffs into the lungs every 6 hours as needed for shortness of breath / dyspnea or wheezing       ARIPiprazole 2 MG tablet    ABILIFY    15 tablet    Take 0.5 tablets (1 mg) by mouth daily       aspirin 81 MG EC tablet     30 tablet     Take 1 tablet (81 mg) by mouth daily       BENADRYL 25 MG capsule   Generic drug:  diphenhydrAMINE      Take 25 mg by mouth At Bedtime.       blood glucose monitoring lancets     1 Box    Use to test blood sugar 2 times daily or as directed.       * blood glucose monitoring meter device kit    no brand specified    1 kit    Use to test blood sugar 2 times daily or as directed.       * blood glucose monitoring meter device kit          blood glucose monitoring test strip    no brand specified    100 strip    Use to test blood sugars 2 times daily or as directed       cyanocobalamin 1000 MCG tablet    vitamin  B-12    30 tablet    Take 1 tablet by mouth daily.       cycloSPORINE modified 25 MG capsule    GENERIC EQUIVALENT    240 capsule    Take 4 capsules (100 mg) by mouth 2 times daily       econazole nitrate 1 % cream     85 g    Apply topically daily       furosemide 20 MG tablet    LASIX    90 tablet    Take 1 tablet (20 mg) by mouth daily       * gabapentin 300 MG capsule    NEURONTIN    90 capsule    Take 1 capsule (300 mg) by mouth At Bedtime       * gabapentin 600 MG tablet    NEURONTIN    90 tablet    Take 0.5 tablets (300 mg) by mouth daily At evening       latanoprost 0.005 % ophthalmic solution    XALATAN    2.5 mL    Place 1 drop into both eyes At Bedtime       metFORMIN 500 MG 24 hr tablet    GLUCOPHAGE-XR    90 tablet    Take 3 tablets (1,500 mg) by mouth daily (with dinner)       mycophenolate 250 MG capsule    CELLCEPT - GENERIC EQUIVALENT    240 capsule    Take 4 capsules (1,000 mg) by mouth 2 times daily       omeprazole 40 MG capsule    priLOSEC    90 capsule    Take 1 capsule (40 mg) by mouth daily       ondansetron 4 MG ODT tab    ZOFRAN-ODT    20 tablet    Take 1 tablet (4 mg) by mouth every 6 hours as needed       prazosin 5 MG capsule    MINIPRESS    90 capsule    Take 3 capsules (15 mg) by mouth At Bedtime       REFRESH OP      Apply to eye as needed Both eyes       simvastatin 20 MG tablet     ZOCOR    90 tablet    Take 1 tablet (20 mg) by mouth At Bedtime       sulfamethoxazole-trimethoprim 400-80 MG per tablet    BACTRIM/SEPTRA    90 tablet    Take 1 tablet by mouth daily       topiramate 200 MG tablet    TOPAMAX    60 tablet    Take 1 tablet (200 mg) by mouth 2 times daily       TYLENOL 325 MG tablet   Generic drug:  acetaminophen      Take 325-650 mg by mouth as needed       vilazodone 40 MG Tabs tablet    VIIBRYD    30 tablet    Take 1 tablet (40 mg) by mouth daily       vitamin D 1000 UNITS capsule     30 capsule    3 tabs (3000 units) daily       * Notice:  This list has 4 medication(s) that are the same as other medications prescribed for you. Read the directions carefully, and ask your doctor or other care provider to review them with you.

## 2017-06-06 NOTE — PROGRESS NOTES
"[facility] Note      Dermatology Problem List:  1. SKs  2. Benign nevi  3. xerosis  4. Renal transplant 3/20/14 (CSA, MMF)    Encounter Date: 2017    CC:   Chief Complaint   Patient presents with     Derm Problem     Elizabeth is here today for a skin check. Elizabeth notes\" No concerns\"       History of Present Illness:  Ms. Elizabeth Palma is a 61 year old female who presents as a follow-up for transplant skin check. The patient was last seen 3/24/2015 by Dr. Colt Kinney. No areas of concern. No bleeding, growing, changing, painful lesions. No history of skin cancers, has not needed biopsies in the past. Uses sunscreen SPF 75 when she goes outside. Grew up in [place], history of <5 blistering sun burns, [place] the [place]. No tanning bed use, but did use baby oil to tan as a kid.    Past Medical History:   Patient Active Problem List   Diagnosis     Autosomal dominant polycystic kidney disease     Hypertension     Hyperlipidemia     Abnormal MRI, cervical spine     Sensory loss     Premature menopause age 37     OP (osteoporosis) T score -3.8     LION on CPAP     Major depressive disorder, recurrent episode, moderate (H)     Generalized anxiety disorder     CMC DJD(carpometacarpal degenerative joint disease), localized primary     Pain in joint, forearm -- L unhealed Fx     -donor kidney transplant     Immunosuppressed status (H)     Hyperparathyroidism, secondary (H)     Hyperparathyroidism (H)     Senile osteoporosis     Pain in joint involving ankle and foot     Nightmares associated with chronic post-traumatic stress disorder     Type 2 diabetes mellitus with peripheral neuropathy (H)     Posttraumatic stress disorder     Right knee pain     Left knee pain     Age-related osteoporosis without current pathological fracture     Past Medical History:   Diagnosis Date     Abnormal MRI, cervical spine 10/15/2011    2011; mild changes noted. Study done for left arm symptoms " Impression:  1. Mild multilevel degenerative disc disease with no significant canal or neural stenosis seen. motion artifact on the STIR images in these are not interpretable. The remaining images were interpreted      Autosomal dominant polycystic kidney disease 2011     (Problem list name updated by automated process. Provider to review and confirm.)     CMC DJD(carpometacarpal degenerative joint disease), localized primary 3/5/2013     -donor kidney transplant 3/20/2014     DM type 2 (diabetes mellitus, type 2) (H) 2013     Encounter for long-term (current) use of other medications 2015     Family history of tremor 10/17/2011     Generalized anxiety disorder 11/15/2012     Glaucoma      Hyperlipidemia 10/15/2011     Hyperparathyroidism, secondary (H) 2015     Hypertension      Immunosuppressed status (H) 3/20/2014     Major depressive disorder, recurrent episode, moderate (H) 11/15/2012     Obesity (BMI 30-39.9)      OP (osteoporosis) T score -3.8 2009 T-score -3.7      LION (obstructive sleep apnoea) 10/15/2012    intol to cpa     Pain in joint, forearm -- L unhealed Fx 2013     Premature menopause age 37 7/10/2012    OCP (vaginal bldg)-->HT which she stopped 2 mo later documented at 2007 visit (age 51).      Restless leg syndrome      Rib fractures 2013     Sensory loss 10/17/2011    Bottom of feet; uncertain if there is a neuropathy per notes.       Stiffness of joint, not elsewhere classified, hand 3/5/2013     Tremor 10/15/2011    head     Past Surgical History:   Procedure Laterality Date     C TRANSPLANTATION OF KIDNEY  3/2014     CHOLECYSTECTOMY       ESOPHAGOSCOPY, GASTROSCOPY, DUODENOSCOPY (EGD), COMBINED N/A 2015    Procedure: COMBINED ESOPHAGOSCOPY, GASTROSCOPY, DUODENOSCOPY (EGD);  Surgeon: Sky Davey MD;  Location:  GI     ESOPHAGOSCOPY, GASTROSCOPY, DUODENOSCOPY (EGD), COMBINED N/A 2015    Procedure: COMBINED  ESOPHAGOSCOPY, GASTROSCOPY, DUODENOSCOPY (EGD), BIOPSY SINGLE OR MULTIPLE;  Surgeon: Sky Davey MD;  Location: [facility]     LAPAROSCOPY, SURGICAL; REPAIR INCISIONAL OR VENTRAL HERNIA       HERMINIA EN Y BOWEL  1990       Social History:  The patient lives in [place]. The patient denies use of tanning beds.    Family History:  There is no family history of skin cancer.    Medications:  Current Outpatient Prescriptions   Medication Sig Dispense Refill     furosemide (LASIX) 20 MG tablet Take 1 tablet (20 mg) by mouth daily 90 tablet 3     blood glucose monitoring (ACCU-CHEK RALPH PLUS) meter device kit   0     prazosin (MINIPRESS) 5 MG capsule Take 3 capsules (15 mg) by mouth At Bedtime 90 capsule 3     ARIPiprazole (ABILIFY) 2 MG tablet Take 0.5 tablets (1 mg) by mouth daily 15 tablet 3     vilazodone (VIIBRYD) 40 MG TABS tablet Take 1 tablet (40 mg) by mouth daily 30 tablet 3     omeprazole (PRILOSEC) 40 MG capsule Take 1 capsule (40 mg) by mouth daily 90 capsule 3     cycloSPORINE modified (GENERIC EQUIVALENT) 25 MG capsule Take 4 capsules (100 mg) by mouth 2 times daily 240 capsule 6     simvastatin (ZOCOR) 20 MG tablet Take 1 tablet (20 mg) by mouth At Bedtime 90 tablet 3     Polyvinyl Alcohol-Povidone (REFRESH OP) Apply to eye as needed Both eyes       latanoprost (XALATAN) 0.005 % ophthalmic solution Place 1 drop into both eyes At Bedtime 2.5 mL 11     blood glucose monitoring (NO BRAND SPECIFIED) meter device kit Use to test blood sugar 2 times daily or as directed. 1 kit 0     blood glucose monitoring (NO BRAND SPECIFIED) test strip Use to test blood sugars 2 times daily or as directed 100 strip 11     blood glucose monitoring (SOFTCLIX) lancets Use to test blood sugar 2 times daily or as directed. 1 Box 11     topiramate (TOPAMAX) 200 MG tablet Take 1 tablet (200 mg) by mouth 2 times daily 60 tablet 5     sulfamethoxazole-trimethoprim (BACTRIM/SEPTRA) 400-80 MG per tablet Take 1 tablet by mouth daily  90 tablet 3     mycophenolate (CELLCEPT - GENERIC EQUIVALENT) 250 MG capsule Take 4 capsules (1,000 mg) by mouth 2 times daily 240 capsule 11     gabapentin (NEURONTIN) 600 MG tablet Take 0.5 tablets (300 mg) by mouth daily At evening 90 tablet 1     gabapentin (NEURONTIN) 300 MG capsule Take 1 capsule (300 mg) by mouth At Bedtime 90 capsule 3     metFORMIN (GLUCOPHAGE-XR) 500 MG 24 hr tablet Take 3 tablets (1,500 mg) by mouth daily (with dinner) 90 tablet 11     acetylcysteine (N-ACETYL-L-CYSTEINE) 600 MG CAPS capsule Take 2 400 mg caps two times daily for total daily dose of 800 mg 30 capsule      ondansetron (ZOFRAN-ODT) 4 MG disintegrating tablet Take 1 tablet (4 mg) by mouth every 6 hours as needed 20 tablet 3     albuterol (PROAIR HFA, PROVENTIL HFA, VENTOLIN HFA) 108 (90 BASE) MCG/ACT inhaler Inhale 2 puffs into the lungs every 6 hours as needed for shortness of breath / dyspnea or wheezing 1 Inhaler 11     Cholecalciferol (VITAMIN D) 1000 UNITS capsule 3 tabs (3000 units) daily 30 capsule      econazole nitrate 1 % cream Apply topically daily (Patient taking differently: Apply topically as needed ) 85 g 3     aspirin EC 81 MG EC tablet Take 1 tablet (81 mg) by mouth daily 30 tablet 5     acetaminophen (TYLENOL) 325 MG tablet Take 325-650 mg by mouth as needed       cyanocolbalamin (VITAMIN  B-12) 1000 MCG tablet Take 1 tablet by mouth daily. 30 tablet 0     diphenhydrAMINE (BENADRYL) 25 MG capsule Take 25 mg by mouth At Bedtime.          Allergies   Allergen Reactions     Percocet [Oxycodone-Acetaminophen] Nausea and Vomiting     Novocain [Procaine Hcl] Hives     Had reaction 25 years ago to old renetta. Pt reports multiple injections of lidocaine since then without reaction.  Tolerated lidocaine injection today without difficulty.  Osmar Mark MD IR Service.         Review of Systems:  -Not pertinent to the current visit.  -Constitutional: The patient denies fatigue, fevers, chills, unintended weight  loss, and night sweats.  -HEENT: Patient denies nonhealing oral sores.  -Skin: As above in HPI. No additional skin concerns.    Physical exam:  Vitals: There were no vitals taken for this visit.  GEN: This is a well developed, well-nourished female in no acute distress, in a pleasant mood.    SKIN: Full skin, which includes the head/face, both arms, chest, back, abdomen,both legs, genitalia and/or groin buttocks, digits and/or nails, was examined.  -There are waxy stuck on tan to brown papules on the L temple, back.  -Multiple regular brown pigmented macules and papules are identified on the cheeks, shoulders, back all <4mm in diameter with homogenous dark brown appearance.   -There is xerosis of the lower legs and feet.   -No other lesions of concern on areas examined.     Impression/Plan:  1. Xerosis    Recommendation for daily moisturization with gentle keratolytics such as ammonium lactate, sal acid, or urea. Recommendations given to different brands    2. History of renal transplantation on CSA, MMF    ABCDs of melanoma and warning signs of NMSC were discussed and self skin checks were advised. , Sun precaution was advised including the use of sun screens of SPF 30 or higher, sun protective clothing, and avoidance of tanning beds.    Reviewed that risk of skin cancers increase after 5 years post transplant    Recommended calling back to be seen asap if she notices any new lesions or changing lesions    3. SKs  - benign; reassurance    Follow-up in 1 year, earlier for new or changing lesions.     Dr. Mcduffie staffed the patient.    Staff Involved:  Resident(Elda Olivares)/Staff(as above)    Staff Physician Comments:   I saw and evaluated the patient with the resident and I agree with the assessment and plan.  I was present for the examination.    Lonny Mcduffie MD  Dermatology Staff Physician  , Department of Dermatology
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
## 2017-06-06 NOTE — NURSING NOTE
"Dermatology Rooming Note    Elizabeth Palma's goals for this visit include:   Chief Complaint   Patient presents with     Derm Problem     Elizabeth is here today for a skin check. Elizabeth notes\" No concerns\"     Rox Perea MA    "

## 2017-06-07 ENCOUNTER — OFFICE VISIT (OUTPATIENT)
Dept: OBGYN | Facility: CLINIC | Age: 62
End: 2017-06-07
Attending: ADVANCED PRACTICE MIDWIFE
Payer: MEDICARE

## 2017-06-07 VITALS
DIASTOLIC BLOOD PRESSURE: 73 MMHG | WEIGHT: 175.1 LBS | BODY MASS INDEX: 34.38 KG/M2 | SYSTOLIC BLOOD PRESSURE: 105 MMHG | HEART RATE: 65 BPM | HEIGHT: 60 IN

## 2017-06-07 DIAGNOSIS — N89.8 VAGINAL DRYNESS: ICD-10-CM

## 2017-06-07 DIAGNOSIS — Z12.39 BREAST CANCER SCREENING: ICD-10-CM

## 2017-06-07 DIAGNOSIS — Z12.31 ENCOUNTER FOR SCREENING MAMMOGRAM FOR MALIGNANT NEOPLASM OF BREAST: ICD-10-CM

## 2017-06-07 DIAGNOSIS — Z01.419 ENCOUNTER FOR ANNUAL ROUTINE GYNECOLOGICAL EXAMINATION: Primary | ICD-10-CM

## 2017-06-07 PROCEDURE — 99212 OFFICE O/P EST SF 10 MIN: CPT | Mod: ZF

## 2017-06-07 RX ORDER — GLYCERIN/MIN OIL/POLYCARBOPHIL
GEL WITH APPLICATOR (GRAM) VAGINAL
Qty: 35 G | Refills: 1 | Status: SHIPPED | OUTPATIENT
Start: 2017-06-07 | End: 2017-07-14

## 2017-06-07 NOTE — PROGRESS NOTES
SUBJECTIVE:  Elizabeth Palma is an 61 year old, , who requests an Annual Preventive Exam.     Concerns today include:    - Last pap 2013, NILM, HPV neg. Pt denies any history of abnormal.    - Reports intercourse painful, not well lubricated, burning with urination afterwards. Denies any change in vaginal discharge, itching or irritation. Denies dysuria.   - Denies any changes in breasts, pain, or discharge. Last mammogram 2016 BI-RADS 1.    Menstrual History:  Menopausal at 36 yo    Last    Lab Results   Component Value Date    PAP NIL 2013     History of abnormal Pap smear: NO - age 30-65 PAP every 5 years with negative HPV co-testing recommended  2013: HPV negative    Mammogram current: yes, to repeat at end of year  Last Mammogram:   Ma Screening Digital Bilateral    Result Date: 11/15/2016  Narrative: SCREENING MAMMOGRAM, BILATERAL, DIGITAL, with CAD and TOMOSYNTHESIS HISTORY: Screening. Ashkenazi [employer]ism descent. History of 1 second degree relative diagnosed with breast cancer at 65 years old. COMPARISON: 2015, 10/10/2014, 2015, 2013, 7/10/2012 BREAST DENSITY: Almost entirely fat. No significant change. No suspicious findings.     Last Colonoscopy:  No results found. However, due to the size of the patient record, not all encounters were searched. Please check Results Review for a complete set of results.    HISTORY:  Current Outpatient Prescriptions on File Prior to Visit:  furosemide (LASIX) 20 MG tablet Take 1 tablet (20 mg) by mouth daily   blood glucose monitoring (ACCU-CHEK RALPH PLUS) meter device kit    prazosin (MINIPRESS) 5 MG capsule Take 3 capsules (15 mg) by mouth At Bedtime   ARIPiprazole (ABILIFY) 2 MG tablet Take 0.5 tablets (1 mg) by mouth daily   vilazodone (VIIBRYD) 40 MG TABS tablet Take 1 tablet (40 mg) by mouth daily   omeprazole (PRILOSEC) 40 MG capsule Take 1 capsule (40 mg) by mouth daily   cycloSPORINE modified (GENERIC EQUIVALENT) 25 MG  capsule Take 4 capsules (100 mg) by mouth 2 times daily   simvastatin (ZOCOR) 20 MG tablet Take 1 tablet (20 mg) by mouth At Bedtime   Polyvinyl Alcohol-Povidone (REFRESH OP) Apply to eye as needed Both eyes   latanoprost (XALATAN) 0.005 % ophthalmic solution Place 1 drop into both eyes At Bedtime   blood glucose monitoring (NO BRAND SPECIFIED) meter device kit Use to test blood sugar 2 times daily or as directed.   blood glucose monitoring (NO BRAND SPECIFIED) test strip Use to test blood sugars 2 times daily or as directed   blood glucose monitoring (SOFTCLIX) lancets Use to test blood sugar 2 times daily or as directed.   topiramate (TOPAMAX) 200 MG tablet Take 1 tablet (200 mg) by mouth 2 times daily   sulfamethoxazole-trimethoprim (BACTRIM/SEPTRA) 400-80 MG per tablet Take 1 tablet by mouth daily   mycophenolate (CELLCEPT - GENERIC EQUIVALENT) 250 MG capsule Take 4 capsules (1,000 mg) by mouth 2 times daily   metFORMIN (GLUCOPHAGE-XR) 500 MG 24 hr tablet Take 3 tablets (1,500 mg) by mouth daily (with dinner)   acetylcysteine (N-ACETYL-L-CYSTEINE) 600 MG CAPS capsule Take 2 400 mg caps two times daily for total daily dose of 800 mg   ondansetron (ZOFRAN-ODT) 4 MG disintegrating tablet Take 1 tablet (4 mg) by mouth every 6 hours as needed   Cholecalciferol (VITAMIN D) 1000 UNITS capsule 2,000 Units    econazole nitrate 1 % cream Apply topically daily (Patient taking differently: Apply topically as needed )   aspirin EC 81 MG EC tablet Take 1 tablet (81 mg) by mouth daily   acetaminophen (TYLENOL) 325 MG tablet Take 325-650 mg by mouth as needed   cyanocolbalamin (VITAMIN  B-12) 1000 MCG tablet Take 1 tablet by mouth daily. (Patient taking differently: Take 1,000 mcg by mouth every other day )   diphenhydrAMINE (BENADRYL) 25 MG capsule Take 25 mg by mouth At Bedtime.     No current facility-administered medications on file prior to visit.   Allergies   Allergen Reactions     Percocet [Oxycodone-Acetaminophen]  Nausea and Vomiting     Novocain [Procaine Hcl] Hives     Had reaction 25 years ago to old renetta. Pt reports multiple injections of lidocaine since then without reaction.  Tolerated lidocaine injection today without difficulty.  Osmar Mark MD IR Service.     Immunization History   Administered Date(s) Administered     Hepatitis B 2010, 2010, 2010     Influenza (H1N1) 2009     Influenza (IIV3) 2008, 10/26/2011, 09/15/2012, 10/01/2013, 10/01/2014, 10/01/2015     Influenza Vaccine IM 3yrs+ 4 Valent IIV4 2016     Mantoux 02/15/2010     Pneumococcal 23 valent 2008     TDAP Vaccine (Boostrix) 10/12/2012     Tetanus 2005     Obstetric History       T2      L0     SAB0   TAB0   Ectopic0   Multiple0   Live Births0    Obstetric Comments   MAB x 7     Past Medical History:   Diagnosis Date     Abnormal MRI, cervical spine 10/15/2011    2011; mild changes noted. Study done for left arm symptoms Impression:  1. Mild multilevel degenerative disc disease with no significant canal or neural stenosis seen. motion artifact on the STIR images in these are not interpretable. The remaining images were interpreted      Autosomal dominant polycystic kidney disease 2011     (Problem list name updated by automated process. Provider to review and confirm.)     [facility] DJD(carpometacarpal degenerative joint disease), localized primary 3/5/2013     -donor kidney transplant 3/20/2014     DM type 2 (diabetes mellitus, type 2) (H) 2013     Encounter for long-term (current) use of other medications 2015     Family history of tremor 10/17/2011     Generalized anxiety disorder 11/15/2012     Glaucoma      Hyperlipidemia 10/15/2011     Hyperparathyroidism, secondary (H) 2015     Hypertension     resolved     Immunosuppressed status (H) 3/20/2014     Major depressive disorder, recurrent episode, moderate (H) 11/15/2012     Obesity (BMI 30-39.9)      OP  (osteoporosis) T score -3.8 9/21/2009 2007 T-score -3.7      LION (obstructive sleep apnoea) 10/15/2012    intol to cpa     Pain in joint, forearm -- L unhealed Fx 5/21/2013     Premature menopause age 37 7/10/2012    OCP (vaginal bldg)-->HT which she stopped 2 mo later documented at Jan 12, 2007 visit (age 51).      Restless leg syndrome      Rib fractures 4/13/2013     Sensory loss 10/17/2011    Bottom of feet; uncertain if there is a neuropathy per notes.       Stiffness of joint, not elsewhere classified, hand 3/5/2013     Tremor 10/15/2011    head     Past Surgical History:   Procedure Laterality Date     ANKLE SURGERY       C TRANSPLANTATION OF KIDNEY  3/2014     C/SECTION, LOW TRANSVERSE      x 2     CHOLECYSTECTOMY  1990     ESOPHAGOSCOPY, GASTROSCOPY, DUODENOSCOPY (EGD), COMBINED N/A 5/19/2015    Procedure: COMBINED ESOPHAGOSCOPY, GASTROSCOPY, DUODENOSCOPY (EGD);  Surgeon: Sky Davey MD;  Location:  GI     ESOPHAGOSCOPY, GASTROSCOPY, DUODENOSCOPY (EGD), COMBINED N/A 5/19/2015    Procedure: COMBINED ESOPHAGOSCOPY, GASTROSCOPY, DUODENOSCOPY (EGD), BIOPSY SINGLE OR MULTIPLE;  Surgeon: Sky Davey MD;  Location:  GI     LAPAROSCOPY, SURGICAL; REPAIR INCISIONAL OR VENTRAL HERNIA       HERMINIA EN Y BOWEL  1990     WRIST SURGERY       Family History   Problem Relation Age of Onset     Glaucoma No family hx of      Macular Degeneration No family hx of      Social History     Social History     Marital status:      Spouse name: Rahat     Number of children: 2     Years of education: N/A     Social History Main Topics     Smoking status: Former Smoker     Smokeless tobacco: Never Used     Alcohol use No     Drug use: No     Sexual activity: Yes     Other Topics Concern     None     Social History Narrative     ROS  [unfilled]  PHQ-9 SCORE 5/3/2017   Total Score 13   Some encounter information is confidential and restricted. Go to Review Flowsheets activity to see all data.     EXAM:  Blood  pressure 105/73, pulse 65, height 1.524 m (5'), weight 79.4 kg (175 lb 1.6 oz), not currently breastfeeding. Body mass index is 34.2 kg/(m^2).  General - pleasant female in no acute distress.  Skin - no suspicious lesions or rashes  EENT-  PERRLA, euthyroid with out palpable nodules  Neck - supple without lymphadenopathy.  Lungs - clear to auscultation bilaterally.  Heart - regular rate and rhythm without murmur.  Abdomen - soft, nontender, nondistended, no masses or organomegaly noted. Well-healed scars noted from gastric bypass, kidney transplant, .  Musculoskeletal - no gross deformities. Scars noted on upper R extremity.  Neurological - normal strength, sensation, and mental status.    Breast Exam:  Breast: Without visible skin changes. No dimpling or lesions seen.   Breasts supple, non-tender with palpation, no dominant mass, nodularity, or nipple discharge noted bilaterally. Axillary nodes negative.      Pelvic Exam:  EG/BUS: Estrogen loss atrophy present. Normal genital architecture without lesions, erythema or abnormal secretions Bartholin's, Urethra, [place]'s normal   Urethral meatus: normal  Urethra: no masses, tenderness, or scarring  Bladder: no masses or tenderness  Vagina: pink, atrophic, clear secretions  Cervix: closed, neg CMT  Uterus: mobile, nontender  Adnexa: Within normal limits and No masses, nodularity, tenderness    ASSESSMENT:  Encounter Diagnoses   Name Primary?     Encounter for annual routine gynecological examination Yes     Vaginal dryness      PLAN:   Orders Placed This Encounter   Medications     Vaginal Lubricant (REPLENS) GEL     Sig: Use vaginally as needed. Can use up to 3 times per week.     Dispense:  35 g     Refill:  1     Discussed estrogen loss with menopause and using Replens or OTC water-based lubricant with intercourse.  Sees psychiatrist monthly and attends group therapy sessions weekly for depression, anxiety, PTSD.  Additional teaching done at this visit  regarding mental health and weight/diet.  Followed by primary care provider for complex medical history (DM2, parathyroidism, osteoporosis, etc.)  Repeat mammogram in 11/2017, future order placed.  Return to clinic in one year for annual exam and pap

## 2017-06-07 NOTE — LETTER
2017       RE: Elizabeth Palma  [id number]     [facility] [address]     Dear Colleague,    Thank you for referring your patient, Elizabeth Palma, to the [facility] at [facility]. Please see a copy of my visit note below.    SUBJECTIVE:  Elizabeth Palma is an 61 year old, , who requests an Annual Preventive Exam.     Concerns today include:    - Last pap 2013, NILM, HPV neg. Pt denies any history of abnormal.    - Reports intercourse painful, not well lubricated, burning with urination afterwards. Denies any change in vaginal discharge, itching or irritation. Denies dysuria.   - Denies any changes in breasts, pain, or discharge. Last mammogram 2016 BI-RADS 1.    Menstrual History:  Menopausal at 34 yo    Last    Lab Results   Component Value Date    PAP NIL 2013     History of abnormal Pap smear: NO - age 30-65 PAP every 5 years with negative HPV co-testing recommended  2013: HPV negative    Mammogram current: yes, to repeat at end of year  Last Mammogram:   Ma Screening Digital Bilateral    Result Date: 11/15/2016  Narrative: SCREENING MAMMOGRAM, BILATERAL, DIGITAL, with CAD and TOMOSYNTHESIS HISTORY: Screening. Ashkenazi [employer] descent. History of 1 second degree relative diagnosed with breast cancer at 65 years old. COMPARISON: 2015, 10/10/2014, 2015, 2013, 7/10/2012 BREAST DENSITY: Almost entirely fat. No significant change. No suspicious findings.     Last Colonoscopy:  No results found. However, due to the size of the patient record, not all encounters were searched. Please check Results Review for a complete set of results.    HISTORY:  Current Outpatient Prescriptions on File Prior to Visit:  furosemide (LASIX) 20 MG tablet Take 1 tablet (20 mg) by mouth daily   blood glucose monitoring (ACCU-CHEK RALPH PLUS) meter device kit    prazosin (MINIPRESS) 5 MG capsule Take 3 capsules (15  mg) by mouth At Bedtime   ARIPiprazole (ABILIFY) 2 MG tablet Take 0.5 tablets (1 mg) by mouth daily   vilazodone (VIIBRYD) 40 MG TABS tablet Take 1 tablet (40 mg) by mouth daily   omeprazole (PRILOSEC) 40 MG capsule Take 1 capsule (40 mg) by mouth daily   cycloSPORINE modified (GENERIC EQUIVALENT) 25 MG capsule Take 4 capsules (100 mg) by mouth 2 times daily   simvastatin (ZOCOR) 20 MG tablet Take 1 tablet (20 mg) by mouth At Bedtime   Polyvinyl Alcohol-Povidone (REFRESH OP) Apply to eye as needed Both eyes   latanoprost (XALATAN) 0.005 % ophthalmic solution Place 1 drop into both eyes At Bedtime   blood glucose monitoring (NO BRAND SPECIFIED) meter device kit Use to test blood sugar 2 times daily or as directed.   blood glucose monitoring (NO BRAND SPECIFIED) test strip Use to test blood sugars 2 times daily or as directed   blood glucose monitoring (SOFTCLIX) lancets Use to test blood sugar 2 times daily or as directed.   topiramate (TOPAMAX) 200 MG tablet Take 1 tablet (200 mg) by mouth 2 times daily   sulfamethoxazole-trimethoprim (BACTRIM/SEPTRA) 400-80 MG per tablet Take 1 tablet by mouth daily   mycophenolate (CELLCEPT - GENERIC EQUIVALENT) 250 MG capsule Take 4 capsules (1,000 mg) by mouth 2 times daily   metFORMIN (GLUCOPHAGE-XR) 500 MG 24 hr tablet Take 3 tablets (1,500 mg) by mouth daily (with dinner)   acetylcysteine (N-ACETYL-L-CYSTEINE) 600 MG CAPS capsule Take 2 400 mg caps two times daily for total daily dose of 800 mg   ondansetron (ZOFRAN-ODT) 4 MG disintegrating tablet Take 1 tablet (4 mg) by mouth every 6 hours as needed   Cholecalciferol (VITAMIN D) 1000 UNITS capsule 2,000 Units    econazole nitrate 1 % cream Apply topically daily (Patient taking differently: Apply topically as needed )   aspirin EC 81 MG EC tablet Take 1 tablet (81 mg) by mouth daily   acetaminophen (TYLENOL) 325 MG tablet Take 325-650 mg by mouth as needed   cyanocolbalamin (VITAMIN  B-12) 1000 MCG tablet Take 1 tablet by  mouth daily. (Patient taking differently: Take 1,000 mcg by mouth every other day )   diphenhydrAMINE (BENADRYL) 25 MG capsule Take 25 mg by mouth At Bedtime.     No current facility-administered medications on file prior to visit.   Allergies   Allergen Reactions     Percocet [Oxycodone-Acetaminophen] Nausea and Vomiting     Novocain [Procaine Hcl] Hives     Had reaction 25 years ago to old renetta. Pt reports multiple injections of lidocaine since then without reaction.  Tolerated lidocaine injection today without difficulty.  Osmar Mark MD IR Service.     Immunization History   Administered Date(s) Administered     Hepatitis B 2010, 2010, 2010     Influenza (H1N1) 2009     Influenza (IIV3) 2008, 10/26/2011, 09/15/2012, 10/01/2013, 10/01/2014, 10/01/2015     Influenza Vaccine IM 3yrs+ 4 Valent IIV4 2016     Mantoux 02/15/2010     Pneumococcal 23 valent 2008     TDAP Vaccine (Boostrix) 10/12/2012     Tetanus 2005     Obstetric History       T2      L0     SAB0   TAB0   Ectopic0   Multiple0   Live Births0    Obstetric Comments   MAB x 7     Past Medical History:   Diagnosis Date     Abnormal MRI, cervical spine 10/15/2011    2011; mild changes noted. Study done for left arm symptoms Impression:  1. Mild multilevel degenerative disc disease with no significant canal or neural stenosis seen. motion artifact on the STIR images in these are not interpretable. The remaining images were interpreted      Autosomal dominant polycystic kidney disease 2011     (Problem list name updated by automated process. Provider to review and confirm.)     [facility] DJD(carpometacarpal degenerative joint disease), localized primary 3/5/2013     -donor kidney transplant 3/20/2014     DM type 2 (diabetes mellitus, type 2) (H) 2013     Encounter for long-term (current) use of other medications 2015     Family history of tremor 10/17/2011     Generalized  anxiety disorder 11/15/2012     Glaucoma      Hyperlipidemia 10/15/2011     Hyperparathyroidism, secondary (H) 2/25/2015     Hypertension     resolved     Immunosuppressed status (H) 3/20/2014     Major depressive disorder, recurrent episode, moderate (H) 11/15/2012     Obesity (BMI 30-39.9)      OP (osteoporosis) T score -3.8 9/21/2009 2007 T-score -3.7      LION (obstructive sleep apnoea) 10/15/2012    intol to cpa     Pain in joint, forearm -- L unhealed Fx 5/21/2013     Premature menopause age 37 7/10/2012    OCP (vaginal bldg)-->HT which she stopped 2 mo later documented at Jan 12, 2007 visit (age 51).      Restless leg syndrome      Rib fractures 4/13/2013     Sensory loss 10/17/2011    Bottom of feet; uncertain if there is a neuropathy per notes.       Stiffness of joint, not elsewhere classified, hand 3/5/2013     Tremor 10/15/2011    head     Past Surgical History:   Procedure Laterality Date     ANKLE SURGERY       C TRANSPLANTATION OF KIDNEY  3/2014     C/SECTION, LOW TRANSVERSE      x 2     CHOLECYSTECTOMY  1990     ESOPHAGOSCOPY, GASTROSCOPY, DUODENOSCOPY (EGD), COMBINED N/A 5/19/2015    Procedure: COMBINED ESOPHAGOSCOPY, GASTROSCOPY, DUODENOSCOPY (EGD);  Surgeon: Sky Davey MD;  Location:  GI     ESOPHAGOSCOPY, GASTROSCOPY, DUODENOSCOPY (EGD), COMBINED N/A 5/19/2015    Procedure: COMBINED ESOPHAGOSCOPY, GASTROSCOPY, DUODENOSCOPY (EGD), BIOPSY SINGLE OR MULTIPLE;  Surgeon: Sky Davey MD;  Location:  GI     LAPAROSCOPY, SURGICAL; REPAIR INCISIONAL OR VENTRAL HERNIA       HERMINIA EN Y BOWEL  1990     WRIST SURGERY       Family History   Problem Relation Age of Onset     Glaucoma No family hx of      Macular Degeneration No family hx of      Social History     Social History     Marital status:      Spouse name: Rahat     Number of children: 2     Years of education: N/A     Social History Main Topics     Smoking status: Former Smoker     Smokeless tobacco: Never Used      Alcohol use No     Drug use: No     Sexual activity: Yes     Other Topics Concern     None     Social History Narrative     ROS  [unfilled]  PHQ-9 SCORE 5/3/2017   Total Score 13   Some encounter information is confidential and restricted. Go to Review Flowsheets activity to see all data.     EXAM:  Blood pressure 105/73, pulse 65, height 1.524 m (5'), weight 79.4 kg (175 lb 1.6 oz), not currently breastfeeding. Body mass index is 34.2 kg/(m^2).  General - pleasant female in no acute distress.  Skin - no suspicious lesions or rashes  EENT-  PERRLA, euthyroid with out palpable nodules  Neck - supple without lymphadenopathy.  Lungs - clear to auscultation bilaterally.  Heart - regular rate and rhythm without murmur.  Abdomen - soft, nontender, nondistended, no masses or organomegaly noted. Well-healed scars noted from gastric bypass, kidney transplant, .  Musculoskeletal - no gross deformities. Scars noted on upper R extremity.  Neurological - normal strength, sensation, and mental status.    Breast Exam:  Breast: Without visible skin changes. No dimpling or lesions seen.   Breasts supple, non-tender with palpation, no dominant mass, nodularity, or nipple discharge noted bilaterally. Axillary nodes negative.      Pelvic Exam:  EG/BUS: Estrogen loss atrophy present. Normal genital architecture without lesions, erythema or abnormal secretions Bartholin's, Urethra, [place]'s normal   Urethral meatus: normal  Urethra: no masses, tenderness, or scarring  Bladder: no masses or tenderness  Vagina: pink, atrophic, clear secretions  Cervix: closed, neg CMT  Uterus: mobile, nontender  Adnexa: Within normal limits and No masses, nodularity, tenderness    ASSESSMENT:  Encounter Diagnoses   Name Primary?     Encounter for annual routine gynecological examination Yes     Vaginal dryness      PLAN:   Orders Placed This Encounter   Medications     Vaginal Lubricant (REPLENS) GEL     Sig: Use vaginally as needed. Can use up to 3  times per week.     Dispense:  35 g     Refill:  1     Discussed estrogen loss with menopause and using Replens or OTC water-based lubricant with intercourse.  Sees psychiatrist monthly and attends group therapy sessions weekly for depression, anxiety, PTSD.  Additional teaching done at this visit regarding mental health and weight/diet.  Followed by primary care provider for complex medical history (DM2, parathyroidism, osteoporosis, etc.)  Repeat mammogram in 11/2017, future order placed.  Return to clinic in one year for annual exam and pap    Again, thank you for allowing me to participate in the care of your patient.      Sincerely,    GERHARD Pineda CNM

## 2017-06-07 NOTE — MR AVS SNAPSHOT
After Visit Summary   6/7/2017    Elizabeth Palma    MRN: [id number]           Patient Information     Date Of Birth          1955        Visit Information        Provider Department      6/7/2017 10:00 AM Param Salas APRN CNM [facility]        Today's Diagnoses     Encounter for annual routine gynecological examination    -  1    Vaginal dryness        Encounter for screening mammogram for malignant neoplasm of breast         Breast cancer screening          Care Instructions      PREVENTIVE HEALTH RECOMMENDATIONS:   Most women need a yearly breast and pelvic exam.    A PAP screen, a test done DURING a pelvic exam, is NO longer recommended yearly.    March 2013, screening guidelines recommended by ACOG for PAP screen are:    1) First pap at age 21.    2) Pap every 3 years until age 30.    3) After age 30, pap every 3 years or Pap with HR HPV screen every 5 years until age 65.  4) Women do NOT need a vaginal Pap screen after a hysterectomy (surgical removal of the uterus) when they have not had cancer.    Exceptions:  1) Yearly pap if HIV+ or immunosuppressed secondary to organ transplant  2) ASHLIEGH II-III continue routine screening for 20 years.    I encourage you continue looking for opportunities to choose a healthy lifestyle:       * Choose to eat a heart healthy diet. Check out the FOOD PLATE guidelines at: http://www.choosemyplate.gov/ for helpful hints on weight and cholesterol management.  Balance your caloric intake with exercise to maintain a BMI in the 22 to 26 range. For bone health: Eat calcium-rich foods like yogurt, broccoli or take chewable calcium pills (500 to 600 mg) twice a day with food.       * Exercise for at least an average of 30 minutes a day, 5 days of the week. This will help you control your weight, release stress, and help prevent disease.      * Take a Vitamin D3 supplement daily fall through spring and during summer unless you xnsr05-22' full  body sun exposure to skin without sunscreen.      * DO wear sunscreen to prevent skin cancer after the first 15-30 minutes.      * Identify stressors in your life, find ways to release the stress, and, make time for yourself. PLEASE ask for help if mood changes last longer than two weeks.     * Limit alcohol to one drink per day.  No smoking.  Avoid second hand smoke. If you smoke, ask for help to stop.       *  If you are in a sexual relationship, talk with your partner about possible infection risks and take action to protect yourself from exposure to a sexual infection.    Please request an infection screen for STIs (sexually transmitted infections) if you are less than age 26 OR believe that you may be at risk.     Get a flu shot each year. Get a tetanus shot every 10 years. EVERYONE needs a pertussis (Whooping cough) booster.    See your dentist twice a year for an exam and preventive care cleaning.     Consider the following screen tests:    1) cholesterol test every 5 years.     2) yearly mammogram after age 40 unless you have identified risks.    3) colonoscopy every 10 years after age 50 unless you have identified risks.    4) diabetes blood test screening if you are at risk for diabetes.      Additional information that you may also find helpful:  The Internet now gives us access to LOTS of information -- some of it helpful, research documented and also plenty of harmful, anecdotal information that may not pertain to your situtaion. Consider visiting the following websites for accurate health information:    www.vitamindcouncil.org/ : Info and ongoing research re Vitamin D    www.fairview.org : Up to date and easily searchable information on multiple topics.    www.medlineplus.gov : medication info, interactive tutorials, watch real surgeries online    www.cdc.gov : public health info, travel advisories, epidemics (H1N1)    www.james/std.org: current research re diagnosis, treatment and prevention of  sexually contacted infections.    www.health[place]state[place] : [place] dept of heat, public health issues in [place], N1N1    www.familydoctor.org : good info from the Academy of Family Physicians                Follow-ups after your visit        Follow-up notes from your care team     Return in 1 year (on 6/7/2018) for Preventative Health Visit.      Your next 10 appointments already scheduled     Jun 16, 2017  9:00 AM CDT   LAB with  LAB   [facility] Lab ([facility])    [address] [place] [address]  1st [facility] [address] [phone]           Patient must bring picture ID.  Patient should be prepared to give a urine specimen  Please do not eat 10-12 hours before your appointment if you are coming in fasting for labs on lipids, cholesterol, or glucose (sugar).  Pregnant women should follow their Care Team instructions. Water with medications is okay. Do not drink coffee or other fluids.   If you have concerns about taking  your medications, please ask at office or if scheduling via [employer], send a message by clicking on Secure Messaging, Message Your Care Team.            Jun 16, 2017  9:30 AM CDT   NUTRITION VISIT with Francesca Danielle RD   [facility] Surgical Weight Management ([facility])    [address] [place] [address]  4th [facility] [address] [phone]            Jun 16, 2017 10:00 AM CDT   XR THORACIC SPINE 3 VIEWS with UCXR1   [facility] Imaging Center Xray ([facility])    [address] [place] [address] [phone]           Please bring a list of your current medicines to your exam. (Include vitamins, minerals and over-thecounter medicines.) Leave your valuables at home.  Tell your doctor if there is a chance you may be pregnant.  You do not need to do anything special for this exam.            Jun 26, 2017 10:00 AM CDT   DBT Individual Therapy with Era Gonzalez, PhD LP   Psychiatry  Clinic ([facility])    [address]  [address] [facility] [address]   [phone]            Jul 11, 2017  1:00 PM CDT   Adult Med Follow UP with Chaparrita Hatch MD   [facility] Psychiatry ([facility])    [address]  [facility] [address]   [phone]            Jul 14, 2017  9:00 AM CDT   LAB with  LAB   [facility] Lab ([facility])    [address] [place] [address]  1st [facility] [address] [phone]           Patient must bring picture ID.  Patient should be prepared to give a urine specimen  Please do not eat 10-12 hours before your appointment if you are coming in fasting for labs on lipids, cholesterol, or glucose (sugar).  Pregnant women should follow their Care Team instructions. Water with medications is okay. Do not drink coffee or other fluids.   If you have concerns about taking  your medications, please ask at office or if scheduling via In[employer], send a message by clicking on Secure Messaging, Message Your Care Team.            Jul 14, 2017  9:30 AM CDT   NUTRITION VISIT with Francesca Danielle RD   [facility] Surgical Weight Management ([facility])    [address] [place] [address]  4th [facility] [address] [phone]            Aug 18, 2017  9:15 AM CDT   (Arrive by 9:00 AM)   Return Visit with Prakash Irene MD   [facility] Medical Weight Management ([facility])    [address] [place] [address]  [address]th [facility] [address] [phone]            Aug 21, 2017 10:00 AM CDT   Return Visit with Javier Tuttle DPM   [facility]    [address] [place] [id number] [phone]            Aug 25, 2017  9:30 AM CDT   NUTRITION VISIT with Francesca Danielle RD   [facility] Surgical Weight Management ([facility])    [address] [place] [address]  [address]  4th [facility] [address] [phone]              Future tests that were ordered for you today     Open Future Orders        Priority Expected Expires Ordered    Mammogram Screening Bilateral Digital [IDZ056] Routine 11/1/2017 6/7/2018 6/7/2017            Who to contact     Please call your clinic at [phone] to:    Ask questions about your health    Make or cancel appointments    Discuss your medicines    Learn about your test results    Speak to your doctor   If you have compliments or concerns about an experience at your clinic, or if you wish to file a complaint, please contact [facility] Patient Relations at [phone] or email us at [email or web address]         Additional Information About Your Visit        [employer] Information     [employer] gives you secure access to your electronic health record. If you see a primary care provider, you can also send messages to your care team and make appointments. If you have questions, please call your primary care clinic.  If you do not have a primary care provider, please call [phone] and they will assist you.      [employer] is an electronic gateway that provides easy, online access to your medical records. With [employer], you can request a clinic appointment, read your test results, renew a prescription or communicate with your care team.     To access your existing account, please contact your [facility] or call [phone] for assistance.        Care EveryWhere ID     This is your Care EveryWhere ID. This could be used by other organizations to access your [facility] medical records  [id number]        Your Vitals Were     Pulse Height BMI (Body Mass Index)             65 1.524 m (5') 34.2 kg/m2          Blood Pressure from Last 3 Encounters:   06/07/17 105/73   06/06/17 162/79   05/22/17 122/68    Weight from Last 3 Encounters:   06/07/17 79.4 kg (175 lb 1.6 oz)    06/06/17 78.3 kg (172 lb 9.6 oz)   05/22/17 79.9 kg (176 lb 1.6 oz)                 Today's Medication Changes          These changes are accurate as of: 6/7/17 12:00 PM.  If you have any questions, ask your nurse or doctor.               Start taking these medicines.        Dose/Directions    replens Gel   Used for:  Vaginal dryness   Started by:  Param Salas APRN CNBHAVNA        Use vaginally as needed. Can use up to 3 times per week.   Quantity:  35 g   Refills:  1         These medicines have changed or have updated prescriptions.        Dose/Directions    cyanocobalamin 1000 MCG tablet   Commonly known as:  vitamin  B-12   This may have changed:  when to take this   Used for:  CKD (chronic kidney disease) stage 5, GFR less than 15 ml/min (H)        Dose:  1000 mcg   Take 1 tablet by mouth daily.   Quantity:  30 tablet   Refills:  0       econazole nitrate 1 % cream   This may have changed:    - when to take this  - reasons to take this   Used for:  Type II or unspecified type diabetes mellitus with neurological manifestations, not stated as uncontrolled, Dermatophytosis of foot        Apply topically daily   Quantity:  85 g   Refills:  3            Where to get your medicines      These medications were sent to [id number] - [place] - [address] [facility] [address]  [address] [address]    Hours:  24-hours Phone:  [phone]     replens Gel                Primary Care Provider Office Phone # Fax #    Horacio Sheridan [phone]        [facility] [address] [place] [address] [address]  [facility] [address]        Thank you!     Thank you for choosing [facility]  for your care. Our goal is always to provide you with excellent care. Hearing back from our patients is one way we can continue to improve our services. Please take a few minutes to complete the written survey that you may receive in the mail after your visit with us. Thank you!              Your Updated Medication List - Protect others around you: Learn how to safely use, store and throw away your medicines at www.disposemymeds.org.          This list is accurate as of: 6/7/17 12:00 PM.  Always use your most recent med list.                   Brand Name Dispense Instructions for use    acetylcysteine 600 MG Caps capsule    N-ACETYL CYSTEINE    30 capsule    Take 2 400 mg caps two times daily for total daily dose of 800 mg       albuterol 108 (90 BASE) MCG/ACT Inhaler    PROAIR HFA/PROVENTIL HFA/VENTOLIN HFA    1 Inhaler    Inhale 2 puffs into the lungs every 6 hours as needed for shortness of breath / dyspnea or wheezing       ARIPiprazole 2 MG tablet    ABILIFY    15 tablet    Take 0.5 tablets (1 mg) by mouth daily       aspirin 81 MG EC tablet     30 tablet    Take 1 tablet (81 mg) by mouth daily       BENADRYL 25 MG capsule   Generic drug:  diphenhydrAMINE      Take 25 mg by mouth At Bedtime.       blood glucose monitoring lancets     1 Box    Use to test blood sugar 2 times daily or as directed.       * blood glucose monitoring meter device kit    no brand specified    1 kit    Use to test blood sugar 2 times daily or as directed.       * blood glucose monitoring meter device kit          blood glucose monitoring test strip    no brand specified    100 strip    Use to test blood sugars 2 times daily or as directed       cyanocobalamin 1000 MCG tablet    vitamin  B-12    30 tablet    Take 1 tablet by mouth daily.       cycloSPORINE modified 25 MG capsule    GENERIC EQUIVALENT    240 capsule    Take 4 capsules (100 mg) by mouth 2 times daily       econazole nitrate 1 % cream     85 g    Apply topically daily       furosemide 20 MG tablet    LASIX    90 tablet    Take 1 tablet (20 mg) by mouth daily       latanoprost 0.005 % ophthalmic solution    XALATAN    2.5 mL    Place 1 drop into both eyes At Bedtime       metFORMIN 500 MG 24 hr tablet    GLUCOPHAGE-XR    90 tablet    Take 3 tablets  (1,500 mg) by mouth daily (with dinner)       mycophenolate 250 MG capsule    CELLCEPT - GENERIC EQUIVALENT    240 capsule    Take 4 capsules (1,000 mg) by mouth 2 times daily       omeprazole 40 MG capsule    priLOSEC    90 capsule    Take 1 capsule (40 mg) by mouth daily       ondansetron 4 MG ODT tab    ZOFRAN-ODT    20 tablet    Take 1 tablet (4 mg) by mouth every 6 hours as needed       prazosin 5 MG capsule    MINIPRESS    90 capsule    Take 3 capsules (15 mg) by mouth At Bedtime       REFRESH OP      Apply to eye as needed Both eyes       replens Gel     35 g    Use vaginally as needed. Can use up to 3 times per week.       simvastatin 20 MG tablet    ZOCOR    90 tablet    Take 1 tablet (20 mg) by mouth At Bedtime       sulfamethoxazole-trimethoprim 400-80 MG per tablet    BACTRIM/SEPTRA    90 tablet    Take 1 tablet by mouth daily       topiramate 200 MG tablet    TOPAMAX    60 tablet    Take 1 tablet (200 mg) by mouth 2 times daily       TYLENOL 325 MG tablet   Generic drug:  acetaminophen      Take 325-650 mg by mouth as needed       vilazodone 40 MG Tabs tablet    VIIBRYD    30 tablet    Take 1 tablet (40 mg) by mouth daily       vitamin D 1000 UNITS capsule     30 capsule    2,000 Units       * Notice:  This list has 2 medication(s) that are the same as other medications prescribed for you. Read the directions carefully, and ask your doctor or other care provider to review them with you.

## 2017-06-07 NOTE — PATIENT INSTRUCTIONS
PREVENTIVE HEALTH RECOMMENDATIONS:   Most women need a yearly breast and pelvic exam.    A PAP screen, a test done DURING a pelvic exam, is NO longer recommended yearly.    March 2013, screening guidelines recommended by ACOG for PAP screen are:    1) First pap at age 21.    2) Pap every 3 years until age 30.    3) After age 30, pap every 3 years or Pap with HR HPV screen every 5 years until age 65.  4) Women do NOT need a vaginal Pap screen after a hysterectomy (surgical removal of the uterus) when they have not had cancer.    Exceptions:  1) Yearly pap if HIV+ or immunosuppressed secondary to organ transplant  2) ASHLEIGH II-III continue routine screening for 20 years.    I encourage you continue looking for opportunities to choose a healthy lifestyle:       * Choose to eat a heart healthy diet. Check out the FOOD PLATE guidelines at: http://www.choosemyplate.gov/ for helpful hints on weight and cholesterol management.  Balance your caloric intake with exercise to maintain a BMI in the 22 to 26 range. For bone health: Eat calcium-rich foods like yogurt, broccoli or take chewable calcium pills (500 to 600 mg) twice a day with food.       * Exercise for at least an average of 30 minutes a day, 5 days of the week. This will help you control your weight, release stress, and help prevent disease.      * Take a Vitamin D3 supplement daily fall through spring and during summer unless you exgi27-47' full body sun exposure to skin without sunscreen.      * DO wear sunscreen to prevent skin cancer after the first 15-30 minutes.      * Identify stressors in your life, find ways to release the stress, and, make time for yourself. PLEASE ask for help if mood changes last longer than two weeks.     * Limit alcohol to one drink per day.  No smoking.  Avoid second hand smoke. If you smoke, ask for help to stop.       *  If you are in a sexual relationship, talk with your partner about possible infection risks and take action to  protect yourself from exposure to a sexual infection.    Please request an infection screen for STIs (sexually transmitted infections) if you are less than age 26 OR believe that you may be at risk.     Get a flu shot each year. Get a tetanus shot every 10 years. EVERYONE needs a pertussis (Whooping cough) booster.    See your dentist twice a year for an exam and preventive care cleaning.     Consider the following screen tests:    1) cholesterol test every 5 years.     2) yearly mammogram after age 40 unless you have identified risks.    3) colonoscopy every 10 years after age 50 unless you have identified risks.    4) diabetes blood test screening if you are at risk for diabetes.      Additional information that you may also find helpful:  The Internet now gives us access to LOTS of information -- some of it helpful, research documented and also plenty of harmful, anecdotal information that may not pertain to your situtaion. Consider visiting the following websites for accurate health information:    www.vitamindcouncil.org/ : Info and ongoing research re Vitamin D    www.fairview.org : Up to date and easily searchable information on multiple topics.    www.medlineplus.gov : medication info, interactive tutorials, watch real surgeries online    www.cdc.gov : public health info, travel advisories, epidemics (H1N1)    www.james/std.org: current research re diagnosis, treatment and prevention of sexually contacted infections.    www.health.[facility].[place] : [place] dept of heatl, public health issues in [place], N1N1    www.familydoctor.org : good info from the Academy of Family Physicians

## 2017-06-12 NOTE — PROGRESS NOTES
"Group Therapy N = 4 present;70  min  Diagnoses: Major Depression (F33.0), Generalized Anxiety (F41.1)   Co-Facilitators: Murphy Gonzalez  and Megan Gomez ([facility])      S/O: Reviewed Homework and what was left over from last week.     Other Topics Discussed:   1. F/U regarding family Relationships  Elizabeth  Reported that her mother was very critical  And would \"counterdict pretty much every thing I said.   \"She still does\"--Elizabeth talked about trying to ignore her mother now but then she gets pulled into it and argues back. Elizabeth would like to break this cycle--she could only work on her part.  Her mother is 82 years old \"and still picking on me.\" Another group member talked about taking a vacation and going to the same town her  Son lives and the hopes of seeing him--managing excitement and and expectations.       2. Living environments--cleaning so that family wants to visit or to have friends come over.  A group member reported that she is still looking for a new apartment but is uncertain as to the timeline for moving.  Another group member Reported that she is working on cleaning which seems to be working since her 16  year old daughter is visiting more.  Another group member talked about her need to move to a  New apartment and her waxing motivatio    3. Management of time and balance.   One group member talked about her new parttime job and needing to go in for training.  Group member is working on being more self sufficient and not  Having to depend on her children to pay any of her expenses.       Assessment: Elizabeth arrived on time to the group. She seemed to listen to other group members attentively and validated other group members concerns. She seems motivated to attend group.   Each week, she seems more comfortable with the group and seems to be more open and willing to share and comment on what others share. She states that despite being very socially connected and  that she lacks close, " fulfilling relationships with friends. She has worked to reconnect with a childhood friend.       Plan: Continue weekly group therapy to work on goals. See treatment plan.    I was not present for the session. I reviewed the case and the note. No charge for the group. Era Gonzalez, PhD LP

## 2017-06-12 NOTE — PROGRESS NOTES
"Family Session, 60 min. Present:  Elizabeth and her  (Rahat, 62)a  Diagnoses: Major Depression (F33.0), Generalized Anxiety (F41.1)  and PTSD        S/O: \"My  says mean things to me.\" Elizabeth came in again stating that this has continued to happen but a little less of since we discussed. Everett discussed Why we were having some family sessions. Her  reported that he did not realize he was saying mean things and wanted to know more specifics. When Elizabeth gave an example, Rahat gave his perspective which was very different. While he spoke, Elizabeth turned away and looked disinterested and then annoys and said \"he never remembers what he says.\" Her  then said that he \"doesn't know how to respond to Elizabeth to \"not make her mad.\" We talked about how he is an \"only child\" and often gets overwhelmed and nervous around Elizabeth.     Other Topics Discussed:   1  Behavioral chains around arguments to see the sequence and function of the arguent.recent events where cr2.    3. Elizabeth used to have hobbies and now really do not do  much. Her  is retired and needs to get out of the house more.     4. Her  , Rahat, was terminated from his job and went on disability in 2014--it has been a hard adjustment--they have too much time together.    5. Continued difficulties working together as a team-lots of mind reading and miscommunication. 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     Assessment: Elizabeth  And her  arrived on time.  Both seemed quite open to feed back. It seems that Elizabeth has a \"passive aggressive approach and has some critical traits she may have learned from her mother. She seems invested in changing these.  Her  was very apologetic for hurting her when he had no idea until this session that she was upset with him. He, too, seems motivated to improve their relationship. He has gone through mindfulness training groups, CBT group and also daytreatment so we " will need to tommy in on skills both have learned and apply to each other.      Plan: Continue family therapy. Complete treatment plan to include family work. I

## 2017-06-13 ENCOUNTER — OFFICE VISIT (OUTPATIENT)
Dept: PSYCHIATRY | Facility: CLINIC | Age: 62
End: 2017-06-13
Attending: PSYCHOLOGIST
Payer: MEDICARE

## 2017-06-13 DIAGNOSIS — F41.1 GENERALIZED ANXIETY DISORDER: ICD-10-CM

## 2017-06-13 DIAGNOSIS — F33.1 MAJOR DEPRESSIVE DISORDER, RECURRENT EPISODE, MODERATE (H): Primary | ICD-10-CM

## 2017-06-13 DIAGNOSIS — F43.10 POSTTRAUMATIC STRESS DISORDER: ICD-10-CM

## 2017-06-13 NOTE — MR AVS SNAPSHOT
After Visit Summary   6/13/2017    Elizabeth Palma    MRN: [id number]           Patient Information     Date Of Birth          1955        Visit Information        Provider Department      6/13/2017 10:30 AM Era Gonzalez, PhD  Psychiatry Clinic        Today's Diagnoses     Major depressive disorder, recurrent episode, moderate (H)    -  1    Posttraumatic stress disorder        Generalized anxiety disorder           Follow-ups after your visit        Your next 10 appointments already scheduled     Jun 26, 2017 10:00 AM CDT   DBT Individual Therapy with Era Gonzalez, PhD    Psychiatry Clinic ([facility])    [address]  [address] [facility] [address]   [phone]            Jul 11, 2017  1:00 PM CDT   Adult Med Follow UP with Chaparrita Hatch MD   [facility] Psychiatry ([facility])    [address]  [facility] [address]   [phone]            Jul 14, 2017  9:00 AM CDT   LAB with  LAB   [facility] ([facility])    [address] [place] [address]  [address] [facility] [address] [phone]           Patient must bring picture ID.  Patient should be prepared to give a urine specimen  Please do not eat 10-12 hours before your appointment if you are coming in fasting for labs on lipids, cholesterol, or glucose (sugar).  Pregnant women should follow their Care Team instructions. Water with medications is okay. Do not drink coffee or other fluids.   If you have concerns about taking  your medications, please ask at office or if scheduling via [employer], send a message by clicking on Secure Messaging, Message Your Care Team.            Jul 14, 2017  9:30 AM CDT   NUTRITION VISIT with Francesca Danielle RD   [facility] Surgical Weight Management ([facility])    [address] [facility]  [address] [facility] [address] [phone]            Aug  18, 2017  9:15 AM CDT   (Arrive by 9:00 AM)   Return Visit with Prakash Irene MD   [facility] Medical Weight Management ([facility] [place])    [address] [place] [id number] [phone]            Aug 21, 2017 10:00 AM CDT   Return Visit with Javier Tuttle DPM   [facility] ([facility])    [address] [place] [id number] [phone]            Aug 25, 2017  9:00 AM CDT   Lab with [facility]    Health Lab ([facility])    [address] [place] [id number] [phone]            Aug 25, 2017  9:30 AM CDT   NUTRITION VISIT with Francesca Danielle RD   [facility] Surgical Weight Management ([facility])    [address] [place] [id number] [phone]            Sep 15, 2017  9:00 AM CDT   Lab with [facility]    Health Lab ([facility])    [address] [place] [id number] [phone]            Sep 15, 2017  9:30 AM CDT   NUTRITION VISIT with Francesca Danielle RD   [facility] Surgical Weight Management ([facility])    [address] [place] [id number] [phone]              Who to contact     Please call your clinic at [phone] to:    Ask questions about your health    Make or cancel appointments    Discuss your medicines    Learn about your test results    Speak to your doctor   If you have compliments or concerns about an experience at your clinic, or if you wish to file a complaint, please contact [facility] Patient Relations at [phone] or email us at [email or web address]         Additional Information About Your Visit        MyChart Information     MyChart gives you secure access to your electronic health record. If you see a primary care provider,  you can also send messages to your care team and make appointments. If you have questions, please call your primary care clinic.  If you do not have a primary care provider, please call [phone] and they will assist you.      [employer] is an electronic gateway that provides easy, online access to your medical records. With [employer], you can request a clinic appointment, read your test results, renew a prescription or communicate with your care team.     To access your existing account, please contact your [facility] or call [phone] for assistance.        Care EveryWhere ID     This is your Care EveryWhere ID. This could be used by other organizations to access your [place] medical records  [id number]         Blood Pressure from Last 3 Encounters:   06/07/17 105/73   06/06/17 162/79   05/22/17 122/68    Weight from Last 3 Encounters:   06/16/17 78.8 kg (173 lb 12.8 oz)   06/07/17 79.4 kg (175 lb 1.6 oz)   06/06/17 78.3 kg (172 lb 9.6 oz)              Today, you had the following     No orders found for display         Today's Medication Changes          These changes are accurate as of: 6/13/17 11:59 PM.  If you have any questions, ask your nurse or doctor.               These medicines have changed or have updated prescriptions.        Dose/Directions    cyanocobalamin 1000 MCG tablet   Commonly known as:  vitamin  B-12   This may have changed:  when to take this   Used for:  CKD (chronic kidney disease) stage 5, GFR less than 15 ml/min (H)        Dose:  1000 mcg   Take 1 tablet by mouth daily.   Quantity:  30 tablet   Refills:  0       econazole nitrate 1 % cream   This may have changed:    - when to take this  - reasons to take this   Used for:  DM neuro manif type II, Dermatophytosis of foot        Apply topically daily   Quantity:  85 g   Refills:  3                Primary Care Provider Office Phone # Fax #    Horacio Sheridan [phone]        PHYSICIANS  [address] [facility] [address]  [facility] [address]        Equal Access to Services     LINNEA HIDALGO : Hadii aad ku hadnatanchhaya Vilmaali, wareedda daniebuddyha, qacarmenta kalluviajuly robertkokojuly, ty joshitannercoy fam. So [facility] [phone].    ATENCIÓN: Si habla español, tiene a goetz disposición servicios gratuitos de asistencia lingüística. [facility] [phone].    We comply with applicable federal civil rights laws and [place] laws. We do not discriminate on the basis of race, color, national origin, age, disability sex, sexual orientation or gender identity.            Thank you!     Thank you for choosing PSYCHIATRY CLINIC  for your care. Our goal is always to provide you with excellent care. Hearing back from our patients is one way we can continue to improve our services. Please take a few minutes to complete the written survey that you may receive in the mail after your visit with us. Thank you!             Your Updated Medication List - Protect others around you: Learn how to safely use, store and throw away your medicines at www.disposemymeds.org.          This list is accurate as of: 6/13/17 11:59 PM.  Always use your most recent med list.                   Brand Name Dispense Instructions for use Diagnosis    acetylcysteine 600 MG Caps capsule    N-ACETYL CYSTEINE    30 capsule    Take 2 400 mg caps two times daily for total daily dose of 800 mg        albuterol 108 (90 BASE) MCG/ACT Inhaler    PROAIR HFA/PROVENTIL HFA/VENTOLIN HFA    1 Inhaler    Inhale 2 puffs into the lungs every 6 hours as needed for shortness of breath / dyspnea or wheezing    Exercise-induced asthma       ARIPiprazole 2 MG tablet    ABILIFY    15 tablet    Take 0.5 tablets (1 mg) by mouth daily    Major depressive disorder, recurrent episode, moderate (H)       aspirin 81 MG EC tablet     30 tablet    Take 1 tablet (81 mg) by mouth daily    Kidney replaced by transplant       BENADRYL 25 MG capsule   Generic drug:  diphenhydrAMINE       Take 25 mg by mouth At Bedtime.        blood glucose monitoring lancets     1 Box    Use to test blood sugar 2 times daily or as directed.    Type 2 diabetes mellitus with peripheral neuropathy (H)       * blood glucose monitoring meter device kit    no brand specified    1 kit    Use to test blood sugar 2 times daily or as directed.    Type 2 diabetes mellitus with peripheral neuropathy (H)       * blood glucose monitoring meter device kit           blood glucose monitoring test strip    no brand specified    100 strip    Use to test blood sugars 2 times daily or as directed    Type 2 diabetes mellitus with peripheral neuropathy (H)       cyanocobalamin 1000 MCG tablet    vitamin  B-12    30 tablet    Take 1 tablet by mouth daily.    CKD (chronic kidney disease) stage 5, GFR less than 15 ml/min (H)       cycloSPORINE modified 25 MG capsule    GENERIC EQUIVALENT    240 capsule    Take 4 capsules (100 mg) by mouth 2 times daily    Kidney replaced by transplant       econazole nitrate 1 % cream     85 g    Apply topically daily    DM neuro manif type II, Dermatophytosis of foot       furosemide 20 MG tablet    LASIX    90 tablet    Take 1 tablet (20 mg) by mouth daily    Generalized edema       latanoprost 0.005 % ophthalmic solution    XALATAN    2.5 mL    Place 1 drop into both eyes At Bedtime    Mild stage glaucoma       metFORMIN 500 MG 24 hr tablet    GLUCOPHAGE-XR    90 tablet    Take 3 tablets (1,500 mg) by mouth daily (with dinner)    Type 2 diabetes mellitus with diabetic polyneuropathy, without long-term current use of insulin (H)       mycophenolate 250 MG capsule    CELLCEPT - GENERIC EQUIVALENT    240 capsule    Take 4 capsules (1,000 mg) by mouth 2 times daily    Kidney transplanted       omeprazole 40 MG capsule    priLOSEC    90 capsule    Take 1 capsule (40 mg) by mouth daily    Gastroesophageal reflux disease without esophagitis       ondansetron 4 MG ODT tab    ZOFRAN-ODT    20 tablet    Take 1  tablet (4 mg) by mouth every 6 hours as needed    Chronic kidney disease, stage V (H)       prazosin 5 MG capsule    MINIPRESS    90 capsule    Take 3 capsules (15 mg) by mouth At Bedtime    Nightmares associated with chronic post-traumatic stress disorder       REFRESH OP      Apply to eye as needed Both eyes        replens Gel     35 g    Use vaginally as needed. Can use up to 3 times per week.    Vaginal dryness       simvastatin 20 MG tablet    ZOCOR    90 tablet    Take 1 tablet (20 mg) by mouth At Bedtime    Chronic kidney disease, stage V (H)       sulfamethoxazole-trimethoprim 400-80 MG per tablet    BACTRIM/SEPTRA    90 tablet    Take 1 tablet by mouth daily    Immunosuppression (H)       topiramate 200 MG tablet    TOPAMAX    60 tablet    Take 1 tablet (200 mg) by mouth 2 times daily    Morbid obesity due to excess calories (H)       TYLENOL 325 MG tablet   Generic drug:  acetaminophen      Take 325-650 mg by mouth as needed        vilazodone 40 MG Tabs tablet    VIIBRYD    30 tablet    Take 1 tablet (40 mg) by mouth daily    Major depressive disorder, recurrent episode, moderate (H)       vitamin D 1000 UNITS capsule     30 capsule    2,000 Units        * Notice:  This list has 2 medication(s) that are the same as other medications prescribed for you. Read the directions carefully, and ask your doctor or other care provider to review them with you.

## 2017-06-16 ENCOUNTER — ALLIED HEALTH/NURSE VISIT (OUTPATIENT)
Dept: SURGERY | Facility: CLINIC | Age: 62
End: 2017-06-16

## 2017-06-16 VITALS — BODY MASS INDEX: 32.81 KG/M2 | WEIGHT: 173.8 LBS | HEIGHT: 61 IN

## 2017-06-16 DIAGNOSIS — Z79.899 ENCOUNTER FOR LONG-TERM CURRENT USE OF MEDICATION: ICD-10-CM

## 2017-06-16 DIAGNOSIS — M81.0 AGE-RELATED OSTEOPOROSIS WITHOUT CURRENT PATHOLOGICAL FRACTURE: ICD-10-CM

## 2017-06-16 DIAGNOSIS — Z94.0 KIDNEY REPLACED BY TRANSPLANT: ICD-10-CM

## 2017-06-16 DIAGNOSIS — Z48.298 AFTERCARE FOLLOWING ORGAN TRANSPLANT: ICD-10-CM

## 2017-06-16 LAB
ANION GAP SERPL CALCULATED.3IONS-SCNC: 10 MMOL/L (ref 3–14)
BUN SERPL-MCNC: 14 MG/DL (ref 7–30)
CALCIUM SERPL-MCNC: 9.1 MG/DL (ref 8.5–10.1)
CHLORIDE SERPL-SCNC: 109 MMOL/L (ref 94–109)
CO2 SERPL-SCNC: 22 MMOL/L (ref 20–32)
CREAT SERPL-MCNC: 0.96 MG/DL (ref 0.52–1.04)
CYCLOSPORINE BLD LC/MS/MS-MCNC: 77 UG/L (ref 50–400)
ERYTHROCYTE [DISTWIDTH] IN BLOOD BY AUTOMATED COUNT: 14.7 % (ref 10–15)
GFR SERPL CREATININE-BSD FRML MDRD: 59 ML/MIN/1.7M2
GLUCOSE SERPL-MCNC: 145 MG/DL (ref 70–99)
HCT VFR BLD AUTO: 41.1 % (ref 35–47)
HGB BLD-MCNC: 13 G/DL (ref 11.7–15.7)
MCH RBC QN AUTO: 27.7 PG (ref 26.5–33)
MCHC RBC AUTO-ENTMCNC: 31.6 G/DL (ref 31.5–36.5)
MCV RBC AUTO: 87 FL (ref 78–100)
PLATELET # BLD AUTO: 169 10E9/L (ref 150–450)
POTASSIUM SERPL-SCNC: 4 MMOL/L (ref 3.4–5.3)
RBC # BLD AUTO: 4.7 10E12/L (ref 3.8–5.2)
SODIUM SERPL-SCNC: 140 MMOL/L (ref 133–144)
TME LAST DOSE: NORMAL H
WBC # BLD AUTO: 3.1 10E9/L (ref 4–11)

## 2017-06-16 NOTE — PROGRESS NOTES
"Nutrition Reassessment  Reason For Visit:  Elizabeth Palma is a 61 year-old female with type 2 DM (oral med management) presenting today for nutrition follow-up, s/p \"gastric bypass in 1990 at [facility]\" per Pt report.  She is seeing medical weight management as well.  She was referred by Dr. Irene.  Note: Pt had a kidney transplant on March 20, 2014.    Pt was accompanied by her .     Anthropometrics:  Height: 61\"  Pre-op Weight: 265 lbs per Pt report; lowest weight after bariatric surgery: 165-170 lbs (25 years ago)  (Pt reported that she initially was at 215 lbs in 2015 prior to seeing writer.)    Current Weight (6/16/17): 173.8 lbs (-3.2 lbs over the past month) with BMI of 32.91.    Current Vitamins/Minerals: 3000 International Units vitamin D/day, Calcium, vitamin C, vitamin B12 daily, B-complex  *Pt stated that she was told not to take other vitamins/minerals by MD.    Nutrition History:  Lactose intolerance ever since bariatric surgery.  Pt stated that she has osteoporosis; however, she stated that her doctors don't want her to take any vitamins or minerals due to her kidney transplant.    Diet/Nutrition Intake Hx: Pt reports her intake varies due to fluctuating appetite. Per pt there are days when she eats 3 meals but on other days she may not eat much at all or nibble on something through out the day. Pt also states her intake is affected by nausea 2/2 her anti-rejection medications. However pt reports she tries to make healthy choices (lower in calorie). Pt is also limited in protein choices that she likes - pt reports she does not take much dairy, does not like beans and lentils, keeps kosher. Advised pt to try to time her meals and eat every 4 hours instead of grazing but pt refused stating she would rather not eat at all.     Physical Activity Assessment: Pt reports she has a tendency to break bones so she is limited with what exercises she does. Pt states there is a long hallway in the " building that she needs to walk when going out. Pt states she does not walk for exercises but walks only to get ADLs done.     Progress with Previous Goals:  1. Avoid grazing through, Eat 3 meals with lean protein at each meal, along with a non-starchy vegetable or whole fruit, up to 1/2 c carb at a meal.-Meeting.   2. If needing a snack, have vegetables (give at least 2 hours between a meals and a snack).- Sometimes; she also has been having 1/4 c nuts once/day.  4. Restart exercise routine (at least 1 time/week walking for 15 min/day slowly increase by 5 minutes a day to a goal of at least 30 minutes or Try a group class - Pt has just been walking here and there lately, sometimes to the grocery store.  Pt stated that the hot, humid weather wears her down. Writer suggested using resistance bands at home.     Per Previous Note:    *Pt stated that she will not record food intake due to the fact that every time she does this, she simply does not eat as she doesn't want to record.  She stated that her therapist strongly advises against recording food intake for this reason.    Nutrition Prescription:  Grams Protein: 60 (minimum)  Amount of Fluid: 48-64 oz    Nutrition Diagnosis  Previous: Obesity related to excessive energy intake as evidenced by BMI > 30.- continues    Intervention  Intervention At Appointment:  Materials/Education provided:  Praised Pt for weight loss, discussing importance of continuing with the goals.  Reviewed goals and upcoming challenges (potential emotional eating with son and son's family moving to [place]).  Discussed meal planning and healthy recipe options.  Encouraged exercise.  Gave encouragement and support to continue.       Patient Understanding: good  Expected Compliance: good    Goals:    1. Avoid grazing through, Eat 3 meals with lean protein at each meal, along with a non-starchy vegetable or whole fruit, up to 1/2 c carb at a meal.    2. If needing a snack, have vegetables (give  at least 2 hours between a meals and a snack).  4. Restart exercise routine (at least 1 time/week walking for 15 min/day slowly increase by 5 minutes a day to a goal of at least 30 minutes or Try a group class     Follow-Up: 1 month    Time spent with patient: 30 minutes.  Francesca Danielle, MS, RDN, LDN, CLT  Pager: [phone]

## 2017-06-16 NOTE — PATIENT INSTRUCTIONS
Goals:    1. Avoid grazing through, Eat 3 meals with lean protein at each meal, along with a non-starchy vegetable or whole fruit, up to 1/2 c carb at a meal.    2. If needing a snack, have vegetables (give at least 2 hours between a meals and a snack).  4. Restart exercise routine (at least 1 time/week walking for 15 min/day slowly increase by 5 minutes a day to a goal of at least 30 minutes or Try a group class.

## 2017-06-16 NOTE — MR AVS SNAPSHOT
MRN:[id number]                      After Visit Summary   6/16/2017    Elizabeth Palma    MRN: [id number]           Visit Information        Provider Department      6/16/2017 9:30 AM Francesca Danielle RD [facility] Surgical Weight Management        Your next 10 appointments already scheduled     Jun 16, 2017 10:00 AM CDT   XR THORACIC SPINE 3 VIEWS with UCXR1   [facility] Imaging Center Xray ([facility] [place])    [address] [facility]  [address] [facility] [address] [phone]           Please bring a list of your current medicines to your exam. (Include vitamins, minerals and over-thecounter medicines.) Leave your valuables at home.  Tell your doctor if there is a chance you may be pregnant.  You do not need to do anything special for this exam.            Jun 26, 2017 10:00 AM CDT   DBT Individual Therapy with Era Gonzalez, PhD    Psychiatry Clinic ([facility])    [address]  [address] [facility] [address]   [phone]            Jul 11, 2017  1:00 PM CDT   Adult Med Follow UP with Chaparrita Hatch MD   [facility]    [address] [facility] [address]  [facility] [address] [phone]            Jul 14, 2017  9:00 AM CDT   LAB with  LAB   [facility] Lab ([facility])    [address] [place] [address] [phone]           Patient must bring picture ID.  Patient should be prepared to give a urine specimen  Please do not eat 10-12 hours before your appointment if you are coming in fasting for labs on lipids, cholesterol, or glucose (sugar).  Pregnant women should follow their Care Team instructions. Water with medications is okay. Do not drink coffee or other fluids.   If you have concerns about taking  your medications, please ask at office or if scheduling via [employer], send a message by clicking on Secure  Messaging, Message Your Care Team.            Jul 14, 2017  9:30 AM CDT   NUTRITION VISIT with Francesca Danielle RD   [facility] Surgical Weight Management ([facility] [place])    [address] [place] [address]   [phone]            Aug 18, 2017  9:15 AM CDT   (Arrive by 9:00 AM)   Return Visit with Prakash Irene MD   [facility] Medical Weight Management ([facility] [place])    [address] [place] [address]   [phone]            Aug 21, 2017 10:00 AM CDT   Return Visit with Javier Tuttle DPM   [facility] ([facility])    [address] [place] [address]   [phone]            Aug 25, 2017  9:30 AM CDT   NUTRITION VISIT with Francesca Danielle RD   [facility] Surgical Weight Management ([facility] [place])    [address] [place] [address]   [phone]            Sep 15, 2017  9:30 AM CDT   NUTRITION VISIT with Francesca Danielle RD   [facility] Surgical Weight Management ([facility] [place])    [address] [place] [address]   [phone]            Oct 09, 2017  9:00 AM CDT   (Arrive by 8:45 AM)   Return Visit with Horacio Sheridan MD   [facility] Clinic ([facility] [place])    [address] [place] [address] [phone]              Care Instructions      Goals:    1. Avoid grazing through, Eat 3 meals with lean protein at each meal, along with a non-starchy vegetable or whole fruit, up to 1/2 c carb at a meal.    2. If needing a snack, have vegetables (give at least 2 hours between a meals and a snack).  4. Restart exercise routine (at least 1 time/week walking for 15 min/day slowly increase by 5 minutes a day to a goal of at least 30 minutes or Try a group class.          MyChart Information      [employer] gives you secure access to your electronic health record. If you see a primary care provider, you can also send messages to your care team and make appointments. If you have questions, please call your primary care clinic.  If you do not have a primary care provider, please call [phone] and they will assist you.      [employer] is an electronic gateway that provides easy, online access to your medical records. With [employer], you can request a clinic appointment, read your test results, renew a prescription or communicate with your care team.     To access your existing account, please contact your [facility] or call [phone] for assistance.        Care EveryWhere ID     This is your Care EveryWhere ID. This could be used by other organizations to access your [place] medical records  [id number]

## 2017-06-19 ENCOUNTER — MEDICAL CORRESPONDENCE (OUTPATIENT)
Dept: HEALTH INFORMATION MANAGEMENT | Facility: CLINIC | Age: 62
End: 2017-06-19

## 2017-06-20 ENCOUNTER — OFFICE VISIT (OUTPATIENT)
Dept: PSYCHIATRY | Facility: CLINIC | Age: 62
End: 2017-06-20
Attending: PSYCHOLOGIST
Payer: MEDICARE

## 2017-06-20 DIAGNOSIS — F43.10 POSTTRAUMATIC STRESS DISORDER: ICD-10-CM

## 2017-06-20 DIAGNOSIS — F33.1 MAJOR DEPRESSIVE DISORDER, RECURRENT EPISODE, MODERATE (H): Primary | ICD-10-CM

## 2017-06-20 DIAGNOSIS — F41.1 GENERALIZED ANXIETY DISORDER: ICD-10-CM

## 2017-06-20 LAB
DEPRECATED CALCIDIOL+CALCIFEROL SERPL-MC: NORMAL UG/L (ref 20–75)
VITAMIN D2 SERPL-MCNC: <5 UG/L
VITAMIN D3 SERPL-MCNC: 62 UG/L

## 2017-06-20 NOTE — MR AVS SNAPSHOT
After Visit Summary   6/20/2017    Elizabeth Palma    MRN: [id number]           Patient Information     Date Of Birth          1955        Visit Information        Provider Department      6/20/2017 10:30 AM Era Gonzalez, PhD  Psychiatry Clinic        Today's Diagnoses     Major depressive disorder, recurrent episode, moderate (H)    -  1    Posttraumatic stress disorder        Generalized anxiety disorder           Follow-ups after your visit        Your next 10 appointments already scheduled     Jun 26, 2017 10:00 AM CDT   DBT Individual Therapy with Era Gonzalez, PhD    Psychiatry Clinic ([facility])    [address]  [address] [facility] [address]   [phone]            Jul 11, 2017  1:00 PM CDT   Adult Med Follow UP with Chaparrita Hatch MD   [facility] ([facility])    [address]  [facility] [address]   [phone]            Jul 14, 2017  9:00 AM CDT   LAB with  LAB   [facility] ([facility])    [address] [place] [address]  [address] [facility] [address] [phone]           Patient must bring picture ID.  Patient should be prepared to give a urine specimen  Please do not eat 10-12 hours before your appointment if you are coming in fasting for labs on lipids, cholesterol, or glucose (sugar).  Pregnant women should follow their Care Team instructions. Water with medications is okay. Do not drink coffee or other fluids.   If you have concerns about taking  your medications, please ask at office or if scheduling via [email or web address], send a message by clicking on Secure Messaging, Message Your Care Team.            Jul 14, 2017  9:30 AM CDT   NUTRITION VISIT with Francesca Danielle RD   [facility] ([facility])    [address]  [address] [facility] [address] [phone]            Aug  18, 2017  9:15 AM CDT   (Arrive by 9:00 AM)   Return Visit with Prakash Irene MD   [facility] ([facility])    [address] [place] [id number] [phone]            Aug 21, 2017 10:00 AM CDT   Return Visit with Javier Tuttle DPM   [facility] ([facility])    [address] [place] [id number] [phone]            Aug 25, 2017  9:00 AM CDT   Lab with [facility]    Health Lab ([facility])    [address] [place] [id number] [phone]            Aug 25, 2017  9:30 AM CDT   NUTRITION VISIT with Francesca Danielle RD   [facility] ([facility])    [address] [place] [id number] [phone]            Sep 15, 2017  9:00 AM CDT   Lab with [facility]    Health Lab ([facility])    [address] [place] [id number] [phone]            Sep 15, 2017  9:30 AM CDT   NUTRITION VISIT with Francesca Danielle RD   [facility] ([facility])    [address] [place] [id number] [phone]              Who to contact     Please call your clinic at [phone] to:    Ask questions about your health    Make or cancel appointments    Discuss your medicines    Learn about your test results    Speak to your doctor   If you have compliments or concerns about an experience at your clinic, or if you wish to file a complaint, please contact [facility] Patient Relations at [phone] or email us at [email or web address]         Additional Information About Your Visit        MyChart Information     MyChart gives you secure access to your electronic health record. If you see a primary care provider,  you can also send messages to your care team and make appointments. If you have questions, please call your primary care clinic.  If you do not have a primary care provider, please call [phone] and they will assist you.      Ui[employer] is an electronic gateway that provides easy, online access to your medical records. With [employer], you can request a clinic appointment, read your test results, renew a prescription or communicate with your care team.     To access your existing account, please contact your [facility] or call [phone] for assistance.        Care EveryWhere ID     This is your Care EveryWhere ID. This could be used by other organizations to access your [place] medical records  [id number]         Blood Pressure from Last 3 Encounters:   06/07/17 105/73   06/06/17 162/79   05/22/17 122/68    Weight from Last 3 Encounters:   06/16/17 78.8 kg (173 lb 12.8 oz)   06/07/17 79.4 kg (175 lb 1.6 oz)   06/06/17 78.3 kg (172 lb 9.6 oz)              Today, you had the following     No orders found for display         Today's Medication Changes          These changes are accurate as of: 6/20/17 11:59 PM.  If you have any questions, ask your nurse or doctor.               These medicines have changed or have updated prescriptions.        Dose/Directions    cyanocobalamin 1000 MCG tablet   Commonly known as:  vitamin  B-12   This may have changed:  when to take this   Used for:  CKD (chronic kidney disease) stage 5, GFR less than 15 ml/min (H)        Dose:  1000 mcg   Take 1 tablet by mouth daily.   Quantity:  30 tablet   Refills:  0       econazole nitrate 1 % cream   This may have changed:    - when to take this  - reasons to take this   Used for:  DM neuro manif type II, Dermatophytosis of foot        Apply topically daily   Quantity:  85 g   Refills:  3                Primary Care Provider Office Phone # Fax #    Horacio Sheridan [phone]        PHYSICIANS  [address]  [facility] [address]        Equal Access to Services     LINNEA HIDALGO : Hadii aad ku hadnatanchhaya Vilmaali, wareedda daniebuddyha, qacarmenta kalluviajuly robertkokojuly, ty joshitannercoy fam. So [facility] [phone].    ATENCIÓN: Si habla español, tiene a goetz disposición servicios gratuitos de asistencia lingüística. [facility] [phone].    We comply with applicable federal civil rights laws and [place] laws. We do not discriminate on the basis of race, color, national origin, age, disability sex, sexual orientation or gender identity.            Thank you!     Thank you for choosing PSYCHIATRY CLINIC  for your care. Our goal is always to provide you with excellent care. Hearing back from our patients is one way we can continue to improve our services. Please take a few minutes to complete the written survey that you may receive in the mail after your visit with us. Thank you!             Your Updated Medication List - Protect others around you: Learn how to safely use, store and throw away your medicines at www.disposemymeds.org.          This list is accurate as of: 6/20/17 11:59 PM.  Always use your most recent med list.                   Brand Name Dispense Instructions for use Diagnosis    acetylcysteine 600 MG Caps capsule    N-ACETYL CYSTEINE    30 capsule    Take 2 400 mg caps two times daily for total daily dose of 800 mg        albuterol 108 (90 BASE) MCG/ACT Inhaler    PROAIR HFA/PROVENTIL HFA/VENTOLIN HFA    1 Inhaler    Inhale 2 puffs into the lungs every 6 hours as needed for shortness of breath / dyspnea or wheezing    Exercise-induced asthma       ARIPiprazole 2 MG tablet    ABILIFY    15 tablet    Take 0.5 tablets (1 mg) by mouth daily    Major depressive disorder, recurrent episode, moderate (H)       aspirin 81 MG EC tablet     30 tablet    Take 1 tablet (81 mg) by mouth daily    Kidney replaced by transplant       BENADRYL 25 MG capsule   Generic drug:  diphenhydrAMINE       Take 25 mg by mouth At Bedtime.        blood glucose monitoring lancets     1 Box    Use to test blood sugar 2 times daily or as directed.    Type 2 diabetes mellitus with peripheral neuropathy (H)       * blood glucose monitoring meter device kit    no brand specified    1 kit    Use to test blood sugar 2 times daily or as directed.    Type 2 diabetes mellitus with peripheral neuropathy (H)       * blood glucose monitoring meter device kit           blood glucose monitoring test strip    no brand specified    100 strip    Use to test blood sugars 2 times daily or as directed    Type 2 diabetes mellitus with peripheral neuropathy (H)       cyanocobalamin 1000 MCG tablet    vitamin  B-12    30 tablet    Take 1 tablet by mouth daily.    CKD (chronic kidney disease) stage 5, GFR less than 15 ml/min (H)       cycloSPORINE modified 25 MG capsule    GENERIC EQUIVALENT    240 capsule    Take 4 capsules (100 mg) by mouth 2 times daily    Kidney replaced by transplant       econazole nitrate 1 % cream     85 g    Apply topically daily    DM neuro manif type II, Dermatophytosis of foot       furosemide 20 MG tablet    LASIX    90 tablet    Take 1 tablet (20 mg) by mouth daily    Generalized edema       latanoprost 0.005 % ophthalmic solution    XALATAN    2.5 mL    Place 1 drop into both eyes At Bedtime    Mild stage glaucoma       metFORMIN 500 MG 24 hr tablet    GLUCOPHAGE-XR    90 tablet    Take 3 tablets (1,500 mg) by mouth daily (with dinner)    Type 2 diabetes mellitus with diabetic polyneuropathy, without long-term current use of insulin (H)       mycophenolate 250 MG capsule    CELLCEPT - GENERIC EQUIVALENT    240 capsule    Take 4 capsules (1,000 mg) by mouth 2 times daily    Kidney transplanted       omeprazole 40 MG capsule    priLOSEC    90 capsule    Take 1 capsule (40 mg) by mouth daily    Gastroesophageal reflux disease without esophagitis       ondansetron 4 MG ODT tab    ZOFRAN-ODT    20 tablet    Take 1  tablet (4 mg) by mouth every 6 hours as needed    Chronic kidney disease, stage V (H)       prazosin 5 MG capsule    MINIPRESS    90 capsule    Take 3 capsules (15 mg) by mouth At Bedtime    Nightmares associated with chronic post-traumatic stress disorder       REFRESH OP      Apply to eye as needed Both eyes        replens Gel     35 g    Use vaginally as needed. Can use up to 3 times per week.    Vaginal dryness       simvastatin 20 MG tablet    ZOCOR    90 tablet    Take 1 tablet (20 mg) by mouth At Bedtime    Chronic kidney disease, stage V (H)       sulfamethoxazole-trimethoprim 400-80 MG per tablet    BACTRIM/SEPTRA    90 tablet    Take 1 tablet by mouth daily    Immunosuppression (H)       topiramate 200 MG tablet    TOPAMAX    60 tablet    Take 1 tablet (200 mg) by mouth 2 times daily    Morbid obesity due to excess calories (H)       TYLENOL 325 MG tablet   Generic drug:  acetaminophen      Take 325-650 mg by mouth as needed        vilazodone 40 MG Tabs tablet    VIIBRYD    30 tablet    Take 1 tablet (40 mg) by mouth daily    Major depressive disorder, recurrent episode, moderate (H)       vitamin D 1000 UNITS capsule     30 capsule    2,000 Units        * Notice:  This list has 2 medication(s) that are the same as other medications prescribed for you. Read the directions carefully, and ask your doctor or other care provider to review them with you.

## 2017-06-20 NOTE — PROGRESS NOTES
Group Therapy N = 6 present; 80 min  Diagnoses: Major Depression (F33.0), Generalized Anxiety (F41.1)   Co-Facilitators: Dr. Megan Gomez, Dr. Era Gonzalez (absent)      S/O: Reviewed Homework and what was left over from last week.       Other Topics Discussed:   1. Making healthy lifestyle choices  Elizabeth reported that she looked into meal planning options and worked with her nutritionist to find healthy food choices.  She also support another group member who started going to weight-specialist center.  The group member was recently started on a new medication that Elizabeth also takes to reduce appetite.  Elizabeth encouraged her to give the medication a chance and to work with the staff at the center to make lifestyle adjustments.  Another group member discussed how she is going to be looking into PT and going to a vascular surgeon to help with her pain and find ways to increase mobility.  Another member also stated that she is going to start pool therapy and hopes that will help with her pain level.        2. Building social connections and networks.  One group member discussed how she is considering joining Facebook or a social network to start connecting to new people but doesn't know how to set up an account.  Elizabeth stated that she really enjoys Facebook and keeping in touch with others.  Another group member states that she bonds with others on the computer through games and common interests.  Another group member feels it could be helpful for her daughter to connect more to her heritage.       3. Re-establishing parental hierarchy  One group member discussed how she is having difficulty motivating her adult daughter to help out around the house. Another group member discussed how she has had to put rules in place for her daughter to follow and emphasized creating rules that you feel comfortable enforcing.  Discussed possible options for consequences for not helping around the house         Assessment: Elizabeth  arrived on time to the group. She  Was quiet most of the group and did not provide much feedback until asked directly.   She came with a topic and actively worked on her homework assignment to call [employer], a meal planning company, and worked with her dietitian to find healthy food choices.      Plan: Continue weekly group therapy to work on goals. See treatment plan.   I was NOT  present for group therapy session, Dr. Gomez video-taped the session and I reviewed both the tape and the notes. I agree with her observations and plan. Era Gonzalez, PhD. LP

## 2017-06-26 ENCOUNTER — OFFICE VISIT (OUTPATIENT)
Dept: PSYCHIATRY | Facility: CLINIC | Age: 62
End: 2017-06-26
Attending: PSYCHOLOGIST
Payer: MEDICARE

## 2017-06-26 DIAGNOSIS — F43.10 POSTTRAUMATIC STRESS DISORDER: ICD-10-CM

## 2017-06-26 DIAGNOSIS — F41.1 GENERALIZED ANXIETY DISORDER: ICD-10-CM

## 2017-06-26 DIAGNOSIS — F33.1 MAJOR DEPRESSIVE DISORDER, RECURRENT EPISODE, MODERATE (H): Primary | ICD-10-CM

## 2017-06-26 NOTE — MR AVS SNAPSHOT
After Visit Summary   6/26/2017    Elizabeth Palma    MRN: [id number]           Patient Information     Date Of Birth          1955        Visit Information        Provider Department      6/26/2017 10:00 AM Era Gonzalez, PhD  Psychiatry [facility]        Today's Diagnoses     Major depressive disorder, recurrent episode, moderate (H)    -  1    Posttraumatic stress disorder        Generalized anxiety disorder           Follow-ups after your visit        Your next 10 appointments already scheduled     Jul 11, 2017  1:00 PM CDT   Adult Med Follow UP with Chaparrita Hatch MD   [facility] ([facility])    [address]  [facility] [place] [address]   [phone]            Jul 14, 2017  9:00 AM CDT   LAB with  LAB   [facility] ([facility])    [address] [place] [address]  [facility] [place] [address] [phone]           Patient must bring picture ID.  Patient should be prepared to give a urine specimen  Please do not eat 10-12 hours before your appointment if you are coming in fasting for labs on lipids, cholesterol, or glucose (sugar).  Pregnant women should follow their Care Team instructions. Water with medications is okay. Do not drink coffee or other fluids.   If you have concerns about taking  your medications, please ask at office or if scheduling via Similar Pages, send a message by clicking on Secure Messaging, Message Your Care Team.            Jul 14, 2017  9:30 AM CDT   NUTRITION VISIT with Francesca Danielle RD   [facility] Surgical [facility] Management ([facility])    [address] [place] [address]  [facility] [place] [address] [phone]            Aug 18, 2017  9:15 AM CDT   (Arrive by 9:00 AM)   Return Visit with Prakash Irene MD   [facility] ([facility])    [address]  [facility] [place] [address]    [phone]            Aug 21, 2017 10:00 AM CDT   Return Visit with Javier Tuttle DPM   [facility] ([facility])    [address] [place] [id number] [phone]            Aug 25, 2017  9:00 AM CDT   Lab with UC LAB   [facility] ([facility])    [address] [place] [id number] [phone]            Aug 25, 2017  9:30 AM CDT   NUTRITION VISIT with Francesca Danielle RD   [facility] ([facility])    [address] [place] [id number] [phone]            Sep 15, 2017  9:00 AM CDT   Lab with UC LAB   [facility] ([facility])    [address] [place] [id number] [phone]            Sep 15, 2017  9:30 AM CDT   NUTRITION VISIT with Francesca Danielle RD   [facility] ([facility])    [address] [place] [id number] [phone]            Oct 09, 2017  9:00 AM CDT   (Arrive by 8:45 AM)   Return Visit with Horacio Sheridan MD   [facility] ([facility])    [address] [place] [id number] [phone]              Who to contact     Please call your clinic at [phone] to:    Ask questions about your health    Make or cancel appointments    Discuss your medicines    Learn about your test results    Speak to your doctor   If you have compliments or concerns about an experience at your clinic, or if you wish to file a complaint, please contact [facility] Patient Relations at [phone] or email us at [email or web address]         Additional Information About Your Visit        MyChart Information     [employer] gives you secure access to your electronic health record. If you see a primary  care provider, you can also send messages to your care team and make appointments. If you have questions, please call your primary care clinic.  If you do not have a primary care provider, please call [phone] and they will assist you.      [employer] is an electronic gateway that provides easy, online access to your medical records. With [employer], you can request a clinic appointment, read your test results, renew a prescription or communicate with your care team.     To access your existing account, please contact your [facility] or call [phone] for assistance.        Care EveryWhere ID     This is your Care EveryWhere ID. This could be used by other organizations to access your [place] medical records  [id number]         Blood Pressure from Last 3 Encounters:   06/07/17 105/73   06/06/17 162/79   05/22/17 122/68    Weight from Last 3 Encounters:   06/16/17 78.8 kg (173 lb 12.8 oz)   06/07/17 79.4 kg (175 lb 1.6 oz)   06/06/17 78.3 kg (172 lb 9.6 oz)              Today, you had the following     No orders found for display         Today's Medication Changes          These changes are accurate as of: 6/26/17 11:59 PM.  If you have any questions, ask your nurse or doctor.               These medicines have changed or have updated prescriptions.        Dose/Directions    cyanocobalamin 1000 MCG tablet   Commonly known as:  vitamin  B-12   This may have changed:  when to take this   Used for:  CKD (chronic kidney disease) stage 5, GFR less than 15 ml/min (H)        Dose:  1000 mcg   Take 1 tablet by mouth daily.   Quantity:  30 tablet   Refills:  0       econazole nitrate 1 % cream   This may have changed:    - when to take this  - reasons to take this   Used for:  DM neuro manif type II, Dermatophytosis of foot        Apply topically daily   Quantity:  85 g   Refills:  3                Primary Care Provider Office Phone # Fax #    Horacio Sheridan [phone]         [facility] [address] [place] [address]  [facility] [address]        Equal Access to Services     LINNEA HIDALGO : Hadii aad ku hadnatanchhaya Quevedo, wareedda ash, qaparker quesadamajuly horowitz, ty joshitannercoy fam. So [facility] [phone].    ATENCIÓN: Si habla español, tiene a goetz disposición servicios gratuitos de asistencia lingüística. Llame al [phone].    We comply with applicable federal civil rights laws and [place] laws. We do not discriminate on the basis of race, color, national origin, age, disability sex, sexual orientation or gender identity.            Thank you!     Thank you for choosing [facility]  for your care. Our goal is always to provide you with excellent care. Hearing back from our patients is one way we can continue to improve our services. Please take a few minutes to complete the written survey that you may receive in the mail after your visit with us. Thank you!             Your Updated Medication List - Protect others around you: Learn how to safely use, store and throw away your medicines at www.disposemymeds.org.          This list is accurate as of: 6/26/17 11:59 PM.  Always use your most recent med list.                   Brand Name Dispense Instructions for use Diagnosis    acetylcysteine 600 MG Caps capsule    N-ACETYL CYSTEINE    30 capsule    Take 2 400 mg caps two times daily for total daily dose of 800 mg        albuterol 108 (90 BASE) MCG/ACT Inhaler    PROAIR HFA/PROVENTIL HFA/VENTOLIN HFA    1 Inhaler    Inhale 2 puffs into the lungs every 6 hours as needed for shortness of breath / dyspnea or wheezing    Exercise-induced asthma       ARIPiprazole 2 MG tablet    ABILIFY    15 tablet    Take 0.5 tablets (1 mg) by mouth daily    Major depressive disorder, recurrent episode, moderate (H)       aspirin 81 MG EC tablet     30 tablet    Take 1 tablet (81 mg) by mouth daily    Kidney replaced by transplant       BENADRYL 25 MG capsule   Generic drug:   diphenhydrAMINE      Take 25 mg by mouth At Bedtime.        blood glucose monitoring lancets     1 Box    Use to test blood sugar 2 times daily or as directed.    Type 2 diabetes mellitus with peripheral neuropathy (H)       * blood glucose monitoring meter device kit    no brand specified    1 kit    Use to test blood sugar 2 times daily or as directed.    Type 2 diabetes mellitus with peripheral neuropathy (H)       * blood glucose monitoring meter device kit           blood glucose monitoring test strip    no brand specified    100 strip    Use to test blood sugars 2 times daily or as directed    Type 2 diabetes mellitus with peripheral neuropathy (H)       cyanocobalamin 1000 MCG tablet    vitamin  B-12    30 tablet    Take 1 tablet by mouth daily.    CKD (chronic kidney disease) stage 5, GFR less than 15 ml/min (H)       cycloSPORINE modified 25 MG capsule    GENERIC EQUIVALENT    240 capsule    Take 4 capsules (100 mg) by mouth 2 times daily    Kidney replaced by transplant       econazole nitrate 1 % cream     85 g    Apply topically daily    DM neuro manif type II, Dermatophytosis of foot       furosemide 20 MG tablet    LASIX    90 tablet    Take 1 tablet (20 mg) by mouth daily    Generalized edema       latanoprost 0.005 % ophthalmic solution    XALATAN    2.5 mL    Place 1 drop into both eyes At Bedtime    Mild stage glaucoma       metFORMIN 500 MG 24 hr tablet    GLUCOPHAGE-XR    90 tablet    Take 3 tablets (1,500 mg) by mouth daily (with dinner)    Type 2 diabetes mellitus with diabetic polyneuropathy, without long-term current use of insulin (H)       mycophenolate 250 MG capsule    CELLCEPT - GENERIC EQUIVALENT    240 capsule    Take 4 capsules (1,000 mg) by mouth 2 times daily    Kidney transplanted       omeprazole 40 MG capsule    priLOSEC    90 capsule    Take 1 capsule (40 mg) by mouth daily    Gastroesophageal reflux disease without esophagitis       ondansetron 4 MG ODT tab    ZOFRAN-ODT    20  tablet    Take 1 tablet (4 mg) by mouth every 6 hours as needed    Chronic kidney disease, stage V (H)       prazosin 5 MG capsule    MINIPRESS    90 capsule    Take 3 capsules (15 mg) by mouth At Bedtime    Nightmares associated with chronic post-traumatic stress disorder       REFRESH OP      Apply to eye as needed Both eyes        replens Gel     35 g    Use vaginally as needed. Can use up to 3 times per week.    Vaginal dryness       simvastatin 20 MG tablet    ZOCOR    90 tablet    Take 1 tablet (20 mg) by mouth At Bedtime    Chronic kidney disease, stage V (H)       sulfamethoxazole-trimethoprim 400-80 MG per tablet    BACTRIM/SEPTRA    90 tablet    Take 1 tablet by mouth daily    Immunosuppression (H)       topiramate 200 MG tablet    TOPAMAX    60 tablet    Take 1 tablet (200 mg) by mouth 2 times daily    Morbid obesity due to excess calories (H)       TYLENOL 325 MG tablet   Generic drug:  acetaminophen      Take 325-650 mg by mouth as needed        vilazodone 40 MG Tabs tablet    VIIBRYD    30 tablet    Take 1 tablet (40 mg) by mouth daily    Major depressive disorder, recurrent episode, moderate (H)       vitamin D 1000 UNITS capsule     30 capsule    2,000 Units        * Notice:  This list has 2 medication(s) that are the same as other medications prescribed for you. Read the directions carefully, and ask your doctor or other care provider to review them with you.

## 2017-06-26 NOTE — PROGRESS NOTES
"Group Therapy N = 4  present; 75 min  Diagnoses: Major Depression (F33.0), Generalized Anxiety (F41.1)   Co-Facilitators: Murphy Gonzalez and Megan Gomez      S/O: Reviewed Homework and what was left over from last week.      Other Topics Discussed:   1. Difficulties cooking and meal planning  healthy  ke--How this relates to mental and physical health.   Elizabeth reported that she has been a vegetarian for over 50 years. The group suggested Options with a [employer] waver to get \"mom' s Meals\" --daily meals delivered vs meals on wheels.\" She reported that she is still missing meals. This week made the meal and just didn't feel like eating which is DIFFERENT than getting mad at him and then not eating which is more passive aggressive.     2. Procrastination and a sense of\"helplessness.\" A group member came in and reported that she has a new cell-phone that she hasn't even taken out of the box and how unsafe this is to go out or even be home without drew working phone. Elizabeth talked about her loosing the 45 lbs but denies that she is trying to \"not eat\"--Helpless tone of voice and looks down when she talks.     3.  Sense of self-worth/ -- Group member gave feedback to another member that she \"was worth it\" to allow herself to 'impose\" or make requests.\"        Assessment: Elizabeth arrived on time to the group. Initiated more of her own issues and asked more questions. . She did report that her \"not eating the meal after cooking it this week was not 'passive aggressive like the previous week with her .        Plan: Continue weekly group therapy to work on goals. See treatment plan.            "

## 2017-06-27 ENCOUNTER — OFFICE VISIT (OUTPATIENT)
Dept: PSYCHIATRY | Facility: CLINIC | Age: 62
End: 2017-06-27
Attending: PSYCHOLOGIST
Payer: MEDICARE

## 2017-06-27 DIAGNOSIS — F43.10 POSTTRAUMATIC STRESS DISORDER: ICD-10-CM

## 2017-06-27 DIAGNOSIS — F33.1 MAJOR DEPRESSIVE DISORDER, RECURRENT EPISODE, MODERATE (H): Primary | ICD-10-CM

## 2017-06-27 DIAGNOSIS — F41.1 GENERALIZED ANXIETY DISORDER: ICD-10-CM

## 2017-06-27 NOTE — MR AVS SNAPSHOT
After Visit Summary   6/27/2017    Elizabeth Palma    MRN: [id number]           Patient Information     Date Of Birth          1955        Visit Information        Provider Department      6/27/2017 10:30 AM Era Gonzalez, PhD  Psychiatry Clinic        Today's Diagnoses     Major depressive disorder, recurrent episode, moderate (H)    -  1    Generalized anxiety disorder        Posttraumatic stress disorder           Follow-ups after your visit        Your next 10 appointments already scheduled     Aug 15, 2017  1:00 PM CDT   Adult Med Follow UP with Chaparrita Hatch MD   [facility] ([facility])    [address]  [facility] [address]   [phone]            Aug 18, 2017  9:15 AM CDT   (Arrive by 9:00 AM)   Return Visit with Prakash Irene MD   [facility] ([facility])    [address] [place] [id number] [phone]            Aug 21, 2017 10:00 AM CDT   Return Visit with Javier Tuttle DPM   [facility] ([facility])    [address] [place] [address]   [phone]            Aug 25, 2017  9:00 AM CDT   Lab with UC LAB   [facility] ([facility])    [address] [place] [id number] [phone]            Aug 25, 2017  9:30 AM CDT   NUTRITION VISIT with Francesca Danielle RD   [facility] ([facility])    [address] [place] [phone] [phone]            Sep 15, 2017  9:00 AM CDT   Lab with UC LAB    Health Lab ([facility])    [address] [place] [phone]            Sep 15, 2017  9:30 AM CDT   NUTRITION VISIT with Francesca Danielle RD   [facility] ([facility] [place])    [address] [place] [address]   [phone]            Oct 09, 2017  9:00 AM CDT   (Arrive by 8:45 AM)   Return Visit with Horacio Sheridan MD   [facility] ([facility])    [address] [place] [phone]            Oct 13, 2017  9:30 AM CDT   NUTRITION VISIT with Francesca Danielle RD   [facility] ([facility])    [address] [place] [phone]            Nov 17, 2017  9:30 AM CST   NUTRITION VISIT with Francesca Danielle RD   [facility] ([facility])    [address] [place] [phone]              Who to contact     Please call your clinic at [phone] to:    Ask questions about your health    Make or cancel appointments    Discuss your medicines    Learn about your test results    Speak to your doctor   If you have compliments or concerns about an experience at your clinic, or if you wish to file a complaint, please contact [facility] Patient Relations at [phone] or email us at [email or web address]         Additional Information About Your Visit        MyChart Information     S[employer] Remarketert gives you secure access to your electronic health record. If you see a primary care provider, you can also send messages to your care team and make appointments. If you have questions, please call your primary care clinic.  If you do not have a primary care provider, please call [phone] and they will assist you.      Pixim is an electronic gateway that provides easy, online access to your medical records. With Pixim, you can request a clinic appointment, read your test results, renew a prescription or communicate with your care team.     To access your  existing account, please contact your [facility] or call [phone] for assistance.        Care EveryWhere ID     This is your Care EveryWhere ID. This could be used by other organizations to access your [place] medical records  [id number]         Blood Pressure from Last 3 Encounters:   07/11/17 135/79   06/07/17 105/73   06/06/17 162/79    Weight from Last 3 Encounters:   07/14/17 78.1 kg (172 lb 3.2 oz)   07/11/17 78.6 kg (173 lb 3.2 oz)   06/16/17 78.8 kg (173 lb 12.8 oz)              Today, you had the following     No orders found for display         Today's Medication Changes          These changes are accurate as of: 6/27/17 11:59 PM.  If you have any questions, ask your nurse or doctor.               These medicines have changed or have updated prescriptions.        Dose/Directions    cyanocobalamin 1000 MCG tablet   Commonly known as:  vitamin  B-12   This may have changed:  when to take this   Used for:  CKD (chronic kidney disease) stage 5, GFR less than 15 ml/min (H)        Dose:  1000 mcg   Take 1 tablet by mouth daily.   Quantity:  30 tablet   Refills:  0       econazole nitrate 1 % cream   This may have changed:    - when to take this  - reasons to take this   Used for:  DM neuro manif type II, Dermatophytosis of foot        Apply topically daily   Quantity:  85 g   Refills:  3                Primary Care Provider Office Phone # Fax #    Horacio Sheridan [phone]        PHYSICIANS [address] [place] [address] 741  [facility] [address]        Equal Access to Services     LINNEA HIDALGO AH: Jennifer richter Somartín, waaxda luqadaha, qaybta kaalmada lor, ty fam. So [facility] [phone].    ATENCIÓN: Si habla español, tiene a goetz disposición servicios gratuitos de asistencia lingüística. Llame al [phone].    We comply with applicable federal civil rights laws and [place] laws. We do not discriminate on the basis of  race, color, national origin, age, disability sex, sexual orientation or gender identity.            Thank you!     Thank you for choosing PSYCHIATRY CLINIC  for your care. Our goal is always to provide you with excellent care. Hearing back from our patients is one way we can continue to improve our services. Please take a few minutes to complete the written survey that you may receive in the mail after your visit with us. Thank you!             Your Updated Medication List - Protect others around you: Learn how to safely use, store and throw away your medicines at www.disposemymeds.org.          This list is accurate as of: 6/27/17 11:59 PM.  Always use your most recent med list.                   Brand Name Dispense Instructions for use Diagnosis    acetylcysteine 600 MG Caps capsule    N-ACETYL CYSTEINE    30 capsule    Take 2 400 mg caps two times daily for total daily dose of 800 mg        albuterol 108 (90 BASE) MCG/ACT Inhaler    PROAIR HFA/PROVENTIL HFA/VENTOLIN HFA    1 Inhaler    Inhale 2 puffs into the lungs every 6 hours as needed for shortness of breath / dyspnea or wheezing    Exercise-induced asthma       ARIPiprazole 2 MG tablet    ABILIFY    15 tablet    Take 0.5 tablets (1 mg) by mouth daily    Major depressive disorder, recurrent episode, moderate (H)       aspirin 81 MG EC tablet     30 tablet    Take 1 tablet (81 mg) by mouth daily    Kidney replaced by transplant       BENADRYL 25 MG capsule   Generic drug:  diphenhydrAMINE      Take 25 mg by mouth At Bedtime.        blood glucose monitoring lancets     1 Box    Use to test blood sugar 2 times daily or as directed.    Type 2 diabetes mellitus with peripheral neuropathy (H)       * blood glucose monitoring meter device kit    no brand specified    1 kit    Use to test blood sugar 2 times daily or as directed.    Type 2 diabetes mellitus with peripheral neuropathy (H)       * blood glucose monitoring meter device kit           blood glucose  monitoring test strip    no brand specified    100 strip    Use to test blood sugars 2 times daily or as directed    Type 2 diabetes mellitus with peripheral neuropathy (H)       cyanocobalamin 1000 MCG tablet    vitamin  B-12    30 tablet    Take 1 tablet by mouth daily.    CKD (chronic kidney disease) stage 5, GFR less than 15 ml/min (H)       cycloSPORINE modified 25 MG capsule    GENERIC EQUIVALENT    240 capsule    Take 4 capsules (100 mg) by mouth 2 times daily    Kidney replaced by transplant       econazole nitrate 1 % cream     85 g    Apply topically daily    DM neuro manif type II, Dermatophytosis of foot       furosemide 20 MG tablet    LASIX    90 tablet    Take 1 tablet (20 mg) by mouth daily    Generalized edema       latanoprost 0.005 % ophthalmic solution    XALATAN    2.5 mL    Place 1 drop into both eyes At Bedtime    Mild stage glaucoma       metFORMIN 500 MG 24 hr tablet    GLUCOPHAGE-XR    90 tablet    Take 3 tablets (1,500 mg) by mouth daily (with dinner)    Type 2 diabetes mellitus with diabetic polyneuropathy, without long-term current use of insulin (H)       mycophenolate 250 MG capsule    CELLCEPT - GENERIC EQUIVALENT    240 capsule    Take 4 capsules (1,000 mg) by mouth 2 times daily    Kidney transplanted       omeprazole 40 MG capsule    priLOSEC    90 capsule    Take 1 capsule (40 mg) by mouth daily    Gastroesophageal reflux disease without esophagitis       ondansetron 4 MG ODT tab    ZOFRAN-ODT    20 tablet    Take 1 tablet (4 mg) by mouth every 6 hours as needed    Chronic kidney disease, stage V (H)       prazosin 5 MG capsule    MINIPRESS    90 capsule    Take 3 capsules (15 mg) by mouth At Bedtime    Nightmares associated with chronic post-traumatic stress disorder       REFRESH OP      Apply to eye as needed Both eyes        simvastatin 20 MG tablet    ZOCOR    90 tablet    Take 1 tablet (20 mg) by mouth At Bedtime    Chronic kidney disease, stage V (H)        sulfamethoxazole-trimethoprim 400-80 MG per tablet    BACTRIM/SEPTRA    90 tablet    Take 1 tablet by mouth daily    Immunosuppression (H)       topiramate 200 MG tablet    TOPAMAX    60 tablet    Take 1 tablet (200 mg) by mouth 2 times daily    Morbid obesity due to excess calories (H)       TYLENOL 325 MG tablet   Generic drug:  acetaminophen      Take 325-650 mg by mouth as needed        vilazodone 40 MG Tabs tablet    VIIBRYD    30 tablet    Take 1 tablet (40 mg) by mouth daily    Major depressive disorder, recurrent episode, moderate (H)       vitamin D 1000 UNITS capsule     30 capsule    2,000 Units        * Notice:  This list has 2 medication(s) that are the same as other medications prescribed for you. Read the directions carefully, and ask your doctor or other care provider to review them with you.

## 2017-07-03 NOTE — PROGRESS NOTES
"Family Session, 60 min. Present:  Elizabeth and her  (Rhaat, 64)a  Diagnoses: Major Depression (F33.0), Generalized Anxiety (F41.1)  and PTSD        S/O: \"He asked me if I wanted something to eat--I didn't ask him to get it. \" I really wasn't upset that he gave me the wrong side of the bagel when he knows that I like the side\"with all of the stuff on it.\" \"He apolgized but then he doesn't remember--he just doesn't remember things.. Elizabeth came in and stated above and was a bit defensive when we did the behavioral chain.\" Therapist attempted to reframe the conversation and focus on communication NOT competition on who will make thins alone.  F/U with last session in which she would have to --take a deep breath and CTSERWhy we were having some family sessions. Her  reported that he did not realize he was saying mean things and wanted to know more specifics. When Elizabeth gave an example, Rahat gave his perspective which was very different. While he spoke, Elizabeth turned away and looked disinterested and then annoys and said \"he never remembers what he says.\" Her  then said that he \"doesn't know how to respond to Elizabeth to \"not make her mad.\"     Other Topics Discussed:   1  Behavioral chains around arguments to see the sequence and function of the arguent.recent events where cr2.    3. Elizabeth used to have hobbies and now really do not do  much. Her  is retired and needs to get out of the house more.     4. Her  , Rahat, was terminated from his job and went on disability in 2014--it has been a hard adjustment--they have too much time together.    5. Continued difficulties working together as a team-lots of mind reading and miscommunication. 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     Assessment: Elizabeth  And her  arrived on time.  Both seemed quite open to feed back. A referral was made for  to join [facility]--he has an intake next week. " "Such an intense conflict \" over a bagel after midnight will need to mindfully put on hold until the next day. They were both too tired and then huy also being hungary. Does he wait on her too much? Does she reciprocate.      Plan: Continue family therapy. Complete treatment plan to include family work. I   "

## 2017-07-11 ENCOUNTER — OFFICE VISIT (OUTPATIENT)
Dept: PSYCHIATRY | Facility: CLINIC | Age: 62
End: 2017-07-11

## 2017-07-11 ENCOUNTER — OFFICE VISIT (OUTPATIENT)
Dept: PSYCHIATRY | Facility: CLINIC | Age: 62
End: 2017-07-11
Attending: PSYCHOLOGIST
Payer: MEDICARE

## 2017-07-11 VITALS
HEART RATE: 76 BPM | DIASTOLIC BLOOD PRESSURE: 79 MMHG | WEIGHT: 173.2 LBS | HEIGHT: 61 IN | SYSTOLIC BLOOD PRESSURE: 135 MMHG | BODY MASS INDEX: 32.7 KG/M2

## 2017-07-11 DIAGNOSIS — F41.1 GENERALIZED ANXIETY DISORDER: ICD-10-CM

## 2017-07-11 DIAGNOSIS — F43.10 POSTTRAUMATIC STRESS DISORDER: ICD-10-CM

## 2017-07-11 DIAGNOSIS — F33.1 MAJOR DEPRESSIVE DISORDER, RECURRENT EPISODE, MODERATE (H): Primary | ICD-10-CM

## 2017-07-11 NOTE — MR AVS SNAPSHOT
After Visit Summary   7/11/2017    Elizabeth Palma    MRN: [id number]           Patient Information     Date Of Birth          1955        Visit Information        Provider Department      7/11/2017 10:30 AM Era Gonzalez, PhD  Psychiatry Clinic        Today's Diagnoses     Major depressive disorder, recurrent episode, moderate (H)    -  1    Posttraumatic stress disorder        Generalized anxiety disorder           Follow-ups after your visit        Your next 10 appointments already scheduled     Aug 14, 2017 11:00 AM CDT   (Arrive by 10:45 AM)   Return Visit with Prakash Irene MD   [facility] ([facility] [place])    [address] [place] [phone]            Aug 15, 2017  1:00 PM CDT   Adult Med Follow UP with Chaparrita Hatch MD   [facility] ([facility])    [address] [place] [phone]   [phone]            Aug 21, 2017 10:00 AM CDT   Return Visit with Javier Tuttle DPM   [facility] ([facility])    [address] [place] [phone]   [phone]            Aug 25, 2017  9:00 AM CDT   Lab with UC LAB   [facility] ([facility])    [address] [place] [phone]            Aug 25, 2017  9:30 AM CDT   NUTRITION VISIT with Francesca Danielle RD   [facility] ([facility])    [address] [place] [phone]            Sep 15, 2017  9:00 AM CDT   Lab with UC LAB    Health Lab ([facility])    [address] [place] [phone]            Sep 15, 2017  9:30 AM CDT   NUTRITION VISIT with Francesca Danielle RD   [facility] ([facility] [place])    [address] [place] [phone]   [phone]            Oct 09, 2017  9:00 AM CDT   (Arrive by 8:45 AM)   Return Visit with Horacio Sheridan MD   [facility] ([facility])    [address] [place] [phone]            Oct 13, 2017  9:30 AM CDT   NUTRITION VISIT with Francesca Danielle RD   [facility] ([facility])    [address] [place] [phone]            Nov 17, 2017  9:30 AM CST   NUTRITION VISIT with Francesca Danielle RD   [facility] ([facility])    [address] [place] [phone]              Who to contact     Please call your clinic at [phone] to:    Ask questions about your health    Make or cancel appointments    Discuss your medicines    Learn about your test results    Speak to your doctor   If you have compliments or concerns about an experience at your clinic, or if you wish to file a complaint, please contact [facility] Patient Relations at [phone] or email us at [email or web address]         Additional Information About Your Visit        MyChart Information     Skipjumpt gives you secure access to your electronic health record. If you see a primary care provider, you can also send messages to your care team and make appointments. If you have questions, please call your primary care clinic.  If you do not have a primary care provider, please call [phone] and they will assist you.      [email or web address] is an electronic gateway that provides easy, online access to your medical records. With [email or web address], you can request a clinic appointment, read your test results, renew a prescription or communicate with your care team.     To access your  existing account, please contact your [facility] or call [phone] for assistance.        Care EveryWhere ID     This is your Care EveryWhere ID. This could be used by other organizations to access your [place] medical records  [id number]         Blood Pressure from Last 3 Encounters:   07/11/17 135/79   06/07/17 105/73   06/06/17 162/79    Weight from Last 3 Encounters:   07/14/17 78.1 kg (172 lb 3.2 oz)   07/11/17 78.6 kg (173 lb 3.2 oz)   06/16/17 78.8 kg (173 lb 12.8 oz)              Today, you had the following     No orders found for display         Today's Medication Changes          These changes are accurate as of: 7/11/17 11:59 PM.  If you have any questions, ask your nurse or doctor.               These medicines have changed or have updated prescriptions.        Dose/Directions    cyanocobalamin 1000 MCG tablet   Commonly known as:  vitamin  B-12   This may have changed:  when to take this   Used for:  CKD (chronic kidney disease) stage 5, GFR less than 15 ml/min (H)        Dose:  1000 mcg   Take 1 tablet by mouth daily.   Quantity:  30 tablet   Refills:  0       econazole nitrate 1 % cream   This may have changed:    - when to take this  - reasons to take this   Used for:  DM neuro manif type II, Dermatophytosis of foot        Apply topically daily   Quantity:  85 g   Refills:  3                Primary Care Provider Office Phone # Fax #    Horacio Sheridan [phone]        PHYSICIANS [address] [place] [address] 741  [facility] [address]        Equal Access to Services     LINNEA HIDALGO AH: Jennifer richter Somartín, waaxda luqadaha, qaybta kaalmada lor, ty fam. So [facility] [phone].    ATENCIÓN: Si habla español, tiene a goetz disposición servicios gratuitos de asistencia lingüística. Llame al [phone].    We comply with applicable federal civil rights laws and [place] laws. We do not discriminate on the basis of  race, color, national origin, age, disability sex, sexual orientation or gender identity.            Thank you!     Thank you for choosing PSYCHIATRY CLINIC  for your care. Our goal is always to provide you with excellent care. Hearing back from our patients is one way we can continue to improve our services. Please take a few minutes to complete the written survey that you may receive in the mail after your visit with us. Thank you!             Your Updated Medication List - Protect others around you: Learn how to safely use, store and throw away your medicines at www.disposemymeds.org.          This list is accurate as of: 7/11/17 11:59 PM.  Always use your most recent med list.                   Brand Name Dispense Instructions for use Diagnosis    acetylcysteine 600 MG Caps capsule    N-ACETYL CYSTEINE    30 capsule    Take 2 400 mg caps two times daily for total daily dose of 800 mg        albuterol 108 (90 BASE) MCG/ACT Inhaler    PROAIR HFA/PROVENTIL HFA/VENTOLIN HFA    1 Inhaler    Inhale 2 puffs into the lungs every 6 hours as needed for shortness of breath / dyspnea or wheezing    Exercise-induced asthma       ARIPiprazole 2 MG tablet    ABILIFY    15 tablet    Take 0.5 tablets (1 mg) by mouth daily    Major depressive disorder, recurrent episode, moderate (H)       aspirin 81 MG EC tablet     30 tablet    Take 1 tablet (81 mg) by mouth daily    Kidney replaced by transplant       BENADRYL 25 MG capsule   Generic drug:  diphenhydrAMINE      Take 25 mg by mouth At Bedtime.        blood glucose monitoring lancets     1 Box    Use to test blood sugar 2 times daily or as directed.    Type 2 diabetes mellitus with peripheral neuropathy (H)       * blood glucose monitoring meter device kit    no brand specified    1 kit    Use to test blood sugar 2 times daily or as directed.    Type 2 diabetes mellitus with peripheral neuropathy (H)       * blood glucose monitoring meter device kit           blood glucose  monitoring test strip    no brand specified    100 strip    Use to test blood sugars 2 times daily or as directed    Type 2 diabetes mellitus with peripheral neuropathy (H)       cyanocobalamin 1000 MCG tablet    vitamin  B-12    30 tablet    Take 1 tablet by mouth daily.    CKD (chronic kidney disease) stage 5, GFR less than 15 ml/min (H)       cycloSPORINE modified 25 MG capsule    GENERIC EQUIVALENT    240 capsule    Take 4 capsules (100 mg) by mouth 2 times daily    Kidney replaced by transplant       econazole nitrate 1 % cream     85 g    Apply topically daily    DM neuro manif type II, Dermatophytosis of foot       furosemide 20 MG tablet    LASIX    90 tablet    Take 1 tablet (20 mg) by mouth daily    Generalized edema       latanoprost 0.005 % ophthalmic solution    XALATAN    2.5 mL    Place 1 drop into both eyes At Bedtime    Mild stage glaucoma       metFORMIN 500 MG 24 hr tablet    GLUCOPHAGE-XR    90 tablet    Take 3 tablets (1,500 mg) by mouth daily (with dinner)    Type 2 diabetes mellitus with diabetic polyneuropathy, without long-term current use of insulin (H)       mycophenolate 250 MG capsule    CELLCEPT - GENERIC EQUIVALENT    240 capsule    Take 4 capsules (1,000 mg) by mouth 2 times daily    Kidney transplanted       omeprazole 40 MG capsule    priLOSEC    90 capsule    Take 1 capsule (40 mg) by mouth daily    Gastroesophageal reflux disease without esophagitis       ondansetron 4 MG ODT tab    ZOFRAN-ODT    20 tablet    Take 1 tablet (4 mg) by mouth every 6 hours as needed    Chronic kidney disease, stage V (H)       prazosin 5 MG capsule    MINIPRESS    90 capsule    Take 3 capsules (15 mg) by mouth At Bedtime    Nightmares associated with chronic post-traumatic stress disorder       REFRESH OP      Apply to eye as needed Both eyes        simvastatin 20 MG tablet    ZOCOR    90 tablet    Take 1 tablet (20 mg) by mouth At Bedtime    Chronic kidney disease, stage V (H)        sulfamethoxazole-trimethoprim 400-80 MG per tablet    BACTRIM/SEPTRA    90 tablet    Take 1 tablet by mouth daily    Immunosuppression (H)       topiramate 200 MG tablet    TOPAMAX    60 tablet    Take 1 tablet (200 mg) by mouth 2 times daily    Morbid obesity due to excess calories (H)       TYLENOL 325 MG tablet   Generic drug:  acetaminophen      Take 325-650 mg by mouth as needed        vilazodone 40 MG Tabs tablet    VIIBRYD    30 tablet    Take 1 tablet (40 mg) by mouth daily    Major depressive disorder, recurrent episode, moderate (H)       vitamin D 1000 UNITS capsule     30 capsule    2,000 Units        * Notice:  This list has 2 medication(s) that are the same as other medications prescribed for you. Read the directions carefully, and ask your doctor or other care provider to review them with you.
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
## 2017-07-11 NOTE — MR AVS SNAPSHOT
After Visit Summary   7/11/2017    Elizabeth Palma    MRN: [id number]           Patient Information     Date Of Birth          1955        Visit Information        Provider Department      7/11/2017 1:00 PM Chaparrita Hatch MD [facility]        Today's Diagnoses     Major depressive disorder, recurrent episode, moderate (H)    -  1    Posttraumatic stress disorder        Generalized anxiety disorder           Follow-ups after your visit        Follow-up notes from your care team     Return in about 4 weeks (around 8/8/2017) for 30 min. MFU.      Your next 10 appointments already scheduled     Jul 14, 2017  9:00 AM CDT   LAB with  LAB   [facility] ([facility])    [address]  1st [facility] [address] [phone]           Patient must bring picture ID.  Patient should be prepared to give a urine specimen  Please do not eat 10-12 hours before your appointment if you are coming in fasting for labs on lipids, cholesterol, or glucose (sugar).  Pregnant women should follow their Care Team instructions. Water with medications is okay. Do not drink coffee or other fluids.   If you have concerns about taking  your medications, please ask at office or if scheduling via [place], send a message by clicking on Secure Messaging, Message Your Care Team.            Jul 14, 2017  9:30 AM CDT   NUTRITION VISIT with Francesca Danielle RD   [facility] ([facility] [place])    [address]  4th [facility] [id number] [phone]            Aug 15, 2017  1:00 PM CDT   Adult Med Follow UP with Chaparrita Hatch MD   [facility] ([facility])    [address]  [facility] [address]   [phone]            Aug 18, 2017  9:15 AM CDT   (Arrive by 9:00 AM)   Return Visit with Prakash Irene MD   [facility] ([facility] [place])    [address] [place] [address]   [phone]            Aug 21, 2017 10:00 AM CDT   Return Visit with Javier Tuttle DPM   [facility] ([facility])    [address] [place] [address] [phone]            Aug 25, 2017  9:00 AM CDT   Lab with UC LAB   [facility] ([facility])    [address] [place] [address]   [phone]            Aug 25, 2017  9:30 AM CDT   NUTRITION VISIT with Francesca Danielle RD   [facility] ([facility])    [address] [place] [address]   [phone]            Sep 15, 2017  9:00 AM CDT   Lab with [facility]   [facility] ([facility])    [address] [place] [address]   [phone]            Sep 15, 2017  9:30 AM CDT   NUTRITION VISIT with Francesca Danielle RD   [facility] ([facility] [place])    [address] [place] [id number] [phone]            Oct 09, 2017  9:00 AM CDT   (Arrive by 8:45 AM)   Return Visit with Horacio Sheridan MD   [facility] ([facility])    [address] [place] [id number] [phone]              Who to contact     Please call your clinic at [phone] to:    Ask questions about your health    Make or cancel appointments    Discuss your medicines    Learn about your test results    Speak to your doctor   If you have compliments or concerns about an experience at your clinic, or if you wish to file a complaint, please contact [facility] Patient Relations at [phone] or email us at [email or web address]         Additional Information About Your Visit        MyChart Information      "[employer] gives you secure access to your electronic health record. If you see a primary care provider, you can also send messages to your care team and make appointments. If you have questions, please call your primary care clinic.  If you do not have a primary care provider, please call [phone] and they will assist you.      [employer] is an electronic gateway that provides easy, online access to your medical records. With [employer], you can request a clinic appointment, read your test results, renew a prescription or communicate with your care team.     To access your existing account, please contact your [facility] or call [phone] for assistance.        Care EveryWhere ID     This is your Care EveryWhere ID. This could be used by other organizations to access your [place] medical records  [id number]        Your Vitals Were     Pulse Height Breastfeeding? BMI (Body Mass Index)          76 5' 1\" (154.9 cm) No 32.73 kg/m2         Blood Pressure from Last 3 Encounters:   07/11/17 135/79   06/07/17 105/73   06/06/17 162/79    Weight from Last 3 Encounters:   07/11/17 173 lb 3.2 oz (78.6 kg)   06/16/17 173 lb 12.8 oz (78.8 kg)   06/07/17 175 lb 1.6 oz (79.4 kg)              Today, you had the following     No orders found for display         Today's Medication Changes          These changes are accurate as of: 7/11/17  1:38 PM.  If you have any questions, ask your nurse or doctor.               These medicines have changed or have updated prescriptions.        Dose/Directions    cyanocobalamin 1000 MCG tablet   Commonly known as:  vitamin  B-12   This may have changed:  when to take this   Used for:  CKD (chronic kidney disease) stage 5, GFR less than 15 ml/min (H)        Dose:  1000 mcg   Take 1 tablet by mouth daily.   Quantity:  30 tablet   Refills:  0       econazole nitrate 1 % cream   This may have changed:    - when to take this  - reasons to take this   Used for:  DM neuro " manif type II, Dermatophytosis of foot        Apply topically daily   Quantity:  85 g   Refills:  3                Primary Care Provider Office Phone # Fax #    Horacio Sheridan [phone]        PHYSICIANS [address] [place] [address] 741  [facility] [address]        Equal Access to Services     LINNEA HIDALGO : Hadii aad ku hadasho Soomaali, waaxda luqadaha, qaybta kaalmada adeegyada, waxay terencein haymelanien jakob joshitannercoy fam. So [facility] [phone].    ATENCIÓN: Si habla español, tiene a gotez disposición servicios gratuitos de asistencia lingüística. [facility] [phone].    We comply with applicable federal civil rights laws and [place] laws. We do not discriminate on the basis of race, color, national origin, age, disability sex, sexual orientation or gender identity.            Thank you!     Thank you for choosing [facility]  for your care. Our goal is always to provide you with excellent care. Hearing back from our patients is one way we can continue to improve our services. Please take a few minutes to complete the written survey that you may receive in the mail after your visit with us. Thank you!             Your Updated Medication List - Protect others around you: Learn how to safely use, store and throw away your medicines at www.disposemymeds.org.          This list is accurate as of: 7/11/17  1:38 PM.  Always use your most recent med list.                   Brand Name Dispense Instructions for use Diagnosis    acetylcysteine 600 MG Caps capsule    N-ACETYL CYSTEINE    30 capsule    Take 2 400 mg caps two times daily for total daily dose of 800 mg        albuterol 108 (90 BASE) MCG/ACT Inhaler    PROAIR HFA/PROVENTIL HFA/VENTOLIN HFA    1 Inhaler    Inhale 2 puffs into the lungs every 6 hours as needed for shortness of breath / dyspnea or wheezing    Exercise-induced asthma       ARIPiprazole 2 MG tablet    ABILIFY    15 tablet    Take 0.5 tablets (1 mg) by mouth daily    Major depressive  disorder, recurrent episode, moderate (H)       aspirin 81 MG EC tablet     30 tablet    Take 1 tablet (81 mg) by mouth daily    Kidney replaced by transplant       BENADRYL 25 MG capsule   Generic drug:  diphenhydrAMINE      Take 25 mg by mouth At Bedtime.        blood glucose monitoring lancets     1 Box    Use to test blood sugar 2 times daily or as directed.    Type 2 diabetes mellitus with peripheral neuropathy (H)       * blood glucose monitoring meter device kit    no brand specified    1 kit    Use to test blood sugar 2 times daily or as directed.    Type 2 diabetes mellitus with peripheral neuropathy (H)       * blood glucose monitoring meter device kit           blood glucose monitoring test strip    no brand specified    100 strip    Use to test blood sugars 2 times daily or as directed    Type 2 diabetes mellitus with peripheral neuropathy (H)       cyanocobalamin 1000 MCG tablet    vitamin  B-12    30 tablet    Take 1 tablet by mouth daily.    CKD (chronic kidney disease) stage 5, GFR less than 15 ml/min (H)       cycloSPORINE modified 25 MG capsule    GENERIC EQUIVALENT    240 capsule    Take 4 capsules (100 mg) by mouth 2 times daily    Kidney replaced by transplant       econazole nitrate 1 % cream     85 g    Apply topically daily    DM neuro manif type II, Dermatophytosis of foot       furosemide 20 MG tablet    LASIX    90 tablet    Take 1 tablet (20 mg) by mouth daily    Generalized edema       latanoprost 0.005 % ophthalmic solution    XALATAN    2.5 mL    Place 1 drop into both eyes At Bedtime    Mild stage glaucoma       metFORMIN 500 MG 24 hr tablet    GLUCOPHAGE-XR    90 tablet    Take 3 tablets (1,500 mg) by mouth daily (with dinner)    Type 2 diabetes mellitus with diabetic polyneuropathy, without long-term current use of insulin (H)       mycophenolate 250 MG capsule    CELLCEPT - GENERIC EQUIVALENT    240 capsule    Take 4 capsules (1,000 mg) by mouth 2 times daily    Kidney transplanted        omeprazole 40 MG capsule    priLOSEC    90 capsule    Take 1 capsule (40 mg) by mouth daily    Gastroesophageal reflux disease without esophagitis       ondansetron 4 MG ODT tab    ZOFRAN-ODT    20 tablet    Take 1 tablet (4 mg) by mouth every 6 hours as needed    Chronic kidney disease, stage V (H)       prazosin 5 MG capsule    MINIPRESS    90 capsule    Take 3 capsules (15 mg) by mouth At Bedtime    Nightmares associated with chronic post-traumatic stress disorder       REFRESH OP      Apply to eye as needed Both eyes        replens Gel     35 g    Use vaginally as needed. Can use up to 3 times per week.    Vaginal dryness       simvastatin 20 MG tablet    ZOCOR    90 tablet    Take 1 tablet (20 mg) by mouth At Bedtime    Chronic kidney disease, stage V (H)       sulfamethoxazole-trimethoprim 400-80 MG per tablet    BACTRIM/SEPTRA    90 tablet    Take 1 tablet by mouth daily    Immunosuppression (H)       topiramate 200 MG tablet    TOPAMAX    60 tablet    Take 1 tablet (200 mg) by mouth 2 times daily    Morbid obesity due to excess calories (H)       TYLENOL 325 MG tablet   Generic drug:  acetaminophen      Take 325-650 mg by mouth as needed        vilazodone 40 MG Tabs tablet    VIIBRYD    30 tablet    Take 1 tablet (40 mg) by mouth daily    Major depressive disorder, recurrent episode, moderate (H)       vitamin D 1000 UNITS capsule     30 capsule    2,000 Units        * Notice:  This list has 2 medication(s) that are the same as other medications prescribed for you. Read the directions carefully, and ask your doctor or other care provider to review them with you.

## 2017-07-14 ENCOUNTER — ALLIED HEALTH/NURSE VISIT (OUTPATIENT)
Dept: SURGERY | Facility: CLINIC | Age: 62
End: 2017-07-14

## 2017-07-14 VITALS — HEIGHT: 61 IN | BODY MASS INDEX: 32.51 KG/M2 | WEIGHT: 172.2 LBS

## 2017-07-14 DIAGNOSIS — Z94.0 KIDNEY REPLACED BY TRANSPLANT: ICD-10-CM

## 2017-07-14 DIAGNOSIS — N89.8 VAGINAL DRYNESS: ICD-10-CM

## 2017-07-14 DIAGNOSIS — Z79.899 ENCOUNTER FOR LONG-TERM CURRENT USE OF MEDICATION: ICD-10-CM

## 2017-07-14 DIAGNOSIS — Z48.298 AFTERCARE FOLLOWING ORGAN TRANSPLANT: ICD-10-CM

## 2017-07-14 LAB
ANION GAP SERPL CALCULATED.3IONS-SCNC: 9 MMOL/L (ref 3–14)
BUN SERPL-MCNC: 15 MG/DL (ref 7–30)
CALCIUM SERPL-MCNC: 9.3 MG/DL (ref 8.5–10.1)
CHLORIDE SERPL-SCNC: 107 MMOL/L (ref 94–109)
CO2 SERPL-SCNC: 23 MMOL/L (ref 20–32)
CREAT SERPL-MCNC: 1.03 MG/DL (ref 0.52–1.04)
CYCLOSPORINE BLD LC/MS/MS-MCNC: 75 UG/L (ref 50–400)
ERYTHROCYTE [DISTWIDTH] IN BLOOD BY AUTOMATED COUNT: 14.2 % (ref 10–15)
GFR SERPL CREATININE-BSD FRML MDRD: 55 ML/MIN/1.7M2
GLUCOSE SERPL-MCNC: 144 MG/DL (ref 70–99)
HCT VFR BLD AUTO: 41.6 % (ref 35–47)
HGB BLD-MCNC: 13.5 G/DL (ref 11.7–15.7)
MCH RBC QN AUTO: 28 PG (ref 26.5–33)
MCHC RBC AUTO-ENTMCNC: 32.5 G/DL (ref 31.5–36.5)
MCV RBC AUTO: 86 FL (ref 78–100)
PLATELET # BLD AUTO: 174 10E9/L (ref 150–450)
POTASSIUM SERPL-SCNC: 3.8 MMOL/L (ref 3.4–5.3)
RBC # BLD AUTO: 4.82 10E12/L (ref 3.8–5.2)
SODIUM SERPL-SCNC: 138 MMOL/L (ref 133–144)
TME LAST DOSE: NORMAL H
WBC # BLD AUTO: 3.5 10E9/L (ref 4–11)

## 2017-07-14 RX ORDER — GLYCERIN/MIN OIL/POLYCARBOPHIL
GEL WITH APPLICATOR (GRAM) VAGINAL
Qty: 35 G | Refills: 11 | Status: SHIPPED | OUTPATIENT
Start: 2017-07-14 | End: 2022-06-14

## 2017-07-14 NOTE — TELEPHONE ENCOUNTER
Received refill request for replens.  Last in clinic 6/2017 where this was initially prescribed.  Please review.

## 2017-07-14 NOTE — MR AVS SNAPSHOT
MRN:[id number]                      After Visit Summary   7/14/2017    Elizabeth Palma    MRN: [id number]           Visit Information        Provider Department      7/14/2017 9:30 AM Francesca Danielle RD [facility]        Your next 10 appointments already scheduled     Aug 15, 2017  1:00 PM CDT   Adult Med Follow UP with Chaparrita Hatch MD   [facility] Psych[facility] ([facility])    [address]  [facility] [address]   [phone]            Aug 18, 2017  9:15 AM CDT   (Arrive by 9:00 AM)   Return Visit with Prakash Irene MD   [facility] ([facility] [place])    [address] [place] [address]  4th [facility] [id number] [phone]            Aug 21, 2017 10:00 AM CDT   Return Visit with Javier Tuttle DPM   [facility] ([facility])    [address] [place] [address] [phone]            Aug 25, 2017  9:00 AM CDT   Lab with UC LAB    Health Lab ([facility] [place])    [address] [place] [id number] [phone]            Aug 25, 2017  9:30 AM CDT   NUTRITION VISIT with Francesca Danielle RD   [facility] ([facility])    [address] [place] [address]  4th [facility] [id number] [phone]            Sep 15, 2017  9:00 AM CDT   Lab with UC LAB    Health Lab ([facility] [place] [facility])    [address] [place] [address]  1st [facility] [id number] [phone]            Sep 15, 2017  9:30 AM CDT   NUTRITION VISIT with Francesca Danielle RD   [facility] ([facility] [place])    [address] [place] [address]  4th [facility] [address]   [phone]            Oct 09, 2017  9:00 AM CDT   (Arrive by 8:45 AM)   Return Visit with Horacio Sheridan MD   [facility] ([facility])    [address] [place] [address]  4th Floor  [facility] [address]   [phone]            Mar 12, 2018 12:00 PM CDT   Lab with  LAB   [facility] ([facility])    [address] [place] [address]  1st [facility] [address]   [phone]            Mar 12, 2018  1:10 PM CDT   (Arrive by 12:40 PM)   Return Kidney Transplant with Christian Jimenez MD   [facility] Nephrology ([facility])    [address] [place] [address]  3rd [facility] [address] [phone]              Care Instructions    Goals:    1. Avoid grazing through, Eat 3 meals with lean protein at each meal, along with a non-starchy vegetable or whole fruit, up to 1/2 c carb at a meal.   -Try hard-boiled eggs to put on your salad or to have later in the day to make up for skipping breakfast.     2. If needing a snack, have vegetables (give at least 2 hours between a meals and a snack).  4. Restart exercise routine (at least 1 time/week walking for 15 min/day slowly increase by 5 minutes a day to a goal of at least 30 minutes or Try a group class          Planet Payment Information     [employer] gives you secure access to your electronic health record. If you see a primary care provider, you can also send messages to your care team and make appointments. If you have questions, please call your primary care clinic.  If you do not have a primary care provider, please call [phone] and they will assist you.      [employer] is an electronic gateway that provides easy, online access to your medical records. With [employer], you can request a clinic appointment, read your test results, renew a prescription or communicate with your care team.     To access your existing account, please contact your [facility] or call [phone] for assistance.        Care EveryWhere ID     This is your Care EveryWhere ID. This could be used by other  organizations to access your [place] medical records  [id number]        Equal Access to Services     LINNEA HIDALGO : Jennifer Quevedo, radha aleman, ty perla. [facility] [phone].    ATENCIÓN: Si habla español, tiene a goetz disposición servicios gratuitos de asistencia lingüística. Llame al [phone].    We comply with applicable federal civil rights laws and [place] laws. We do not discriminate on the basis of race, color, national origin, age, disability sex, sexual orientation or gender identity.

## 2017-07-14 NOTE — PATIENT INSTRUCTIONS
Goals:    1. Avoid grazing through, Eat 3 meals with lean protein at each meal, along with a non-starchy vegetable or whole fruit, up to 1/2 c carb at a meal.   -Try hard-boiled eggs to put on your salad or to have later in the day to make up for skipping breakfast.     2. If needing a snack, have vegetables (give at least 2 hours between a meals and a snack).  4. Restart exercise routine (at least 1 time/week walking for 15 min/day slowly increase by 5 minutes a day to a goal of at least 30 minutes or Try a group class

## 2017-07-14 NOTE — PROGRESS NOTES
"Nutrition Reassessment  Reason For Visit:  Elizabeth Palma is a 61 year-old female with type 2 DM (oral med management) presenting today for nutrition follow-up, s/p \"gastric bypass in 1990 at [facility]\" per Pt report.  She is seeing medical weight management.  She was referred by Dr. Irene.  Note: Pt had a kidney transplant on March 20, 2014.    Pt was accompanied by her .     Anthropometrics:  Height: 61\"  Pre-op Weight: 265 lbs per Pt report; lowest weight after bariatric surgery: 165-170 lbs (25 years ago)  (Pt reported that she initially was at 215 lbs in 2015 prior to seeing writer.)    Current Weight (6/16/17): 172.2 lbs (-1.6 lbs over the past month) with BMI of 32.6.    Current Vitamins/Minerals: 3000 International Units vitamin D/day, Calcium, vitamin C, vitamin B12 daily, B-complex  *Pt stated that she was told not to take other vitamins/minerals by MD.    Nutrition History:  Lactose intolerance ever since bariatric surgery.  Pt stated that she has osteoporosis; however, she stated that her doctors don't want her to take any vitamins or minerals due to her kidney transplant.    Diet/Nutrition Intake Hx: Pt reports her intake varies due to fluctuating appetite. Per pt there are days when she eats 3 meals but on other days she may not eat much at all or nibble on something through out the day. Pt also states her intake is affected by nausea 2/2 her anti-rejection medications. However pt reports she tries to make healthy choices (lower in calorie). Pt is also limited in protein choices that she likes - pt reports she does not take much dairy, does not like beans and lentils, keeps kosher. Advised pt to try to time her meals and eat every 4 hours instead of grazing but pt refused stating she would rather not eat at all.     *Pt stated that she will not record food intake due to the fact that every time she does this, she simply does not eat as she doesn't want to record.  She stated that her " therapist strongly advises against recording food intake for this reason.      Physical Activity Assessment: Pt reports she has a tendency to break bones so she is limited with what exercises she does. Pt states there is a long hallway in the building that she needs to walk when going out. Pt states she does not walk for exercises but walks only to get ADLs done.     Progress with Previous Goals:    1. Avoid grazing through, Eat 3 meals with lean protein at each meal, along with a non-starchy vegetable or whole fruit, up to 1/2 c carb at a meal.- Meeting.   Breakfast: skip  Lunch: salad with feta cheese or a little sunflower seeds or protein bar (20 g protein) or 2 eggs and 1 toast, fruit  Supper: stir-goss (vegetables and chicken)  Snack: protein bar    2. If needing a snack, have vegetables (give at least 2 hours between a meals and a snack).- Pt will either have a protein bar or vegetables.   3. Restart exercise routine (at least 1 time/week walking for 15 min/day slowly increase by 5 minutes a day to a goal of at least 30 minutes or Try a gentle group class- Pt has been walking 1 time/week for 20 min.  Pt just learned that she has broken ribs and vertebrae which are healing (has osteoporosis).     Nutrition Prescription:  Grams Protein: 60 (minimum)  Amount of Fluid: 48-64 oz    Nutrition Diagnosis  Previous: Obesity related to excessive energy intake as evidenced by BMI > 30.- continues    Intervention  Intervention At Appointment:  Materials/Education provided:  Praised Pt for weight loss, discussing importance of continuing with the goals.  Discussed challenges (lack of appetite as her son's family along with her 3-year old grandson moved to [place]).  Discussed meal planning and optimizing protein.  Encouraged exercise.  Gave encouragement and support to continue.       Patient Understanding: good  Expected Compliance: good    Goals:    1. Avoid grazing through, Eat 3 meals with lean protein at each meal,  along with a non-starchy vegetable or whole fruit, up to 1/2 c carb at a meal.   -Try hard-boiled eggs to put on your salad or to have later in the day to make up for skipping breakfast.     2. If needing a snack, have vegetables (give at least 2 hours between a meals and a snack).  4. Restart exercise routine (at least 1 time/week walking for 15 min/day slowly increase by 5 minutes a day to a goal of at least 30 minutes or Try a gentle group class     Follow-Up: 1 month    Time spent with patient: 30 minutes.  Francesca Danielle, MS, RDN, LDN, CLT  Pager: [phone]

## 2017-07-17 NOTE — PROGRESS NOTES
"Group Therapy N = 5 present; 70 min  Diagnoses: Major Depression (F33.0), Generalized Anxiety (F41.1)   Co-Facilitators: Murphy Gonzalez , Steph Marquis and Rhiannon Pacheco      S/O: Introductions to new co-therapists. Reviewed Homework and what was left over from last week.       Other Topics Discussed:   1. Following through with appointments.   A group member  reported that she cancelled her sleep study again. Group gave her feedback.Pt could relate with difficulties following meal planning.  .        2. Maintaining relationships with family--children and grandchildren and possibly siblings who live out of state. How to be creative around this with grandchildren who are 4 or younger and also manage our own sadness so children don't take this on.   One group member discussed how she is considering joining Facebook or a social network to start connecting to new people but doesn't know how to set up an account.  Elizabeth stated that she really enjoys Facebook and keeping in touch with others.  Another group member states that she bonds with others on the computer through games and common interests.  Another group member feels it could be helpful for her daughter to connect more to her heritage.       3. Following up with colleague, friend or boss if you feel like you are being discounted or treated unfairly.   Elizabeth reported that she took the advice of group member last session and did call the president  of the [employer] where she volunteered and explained the letter she got from the Board of Directors  criticizing her decrease of involvement and how she felt like she gave them advance notice and her excuses were accepted since she is working on self care. She found out that it was actually a form letter sent out to all the members. Group discussion regarding misinterpretations in communication and \"not mind reading.\" Being assertive and checking things out can save a lot of conflicts and maintain relationships. " "      Assessment: Elizabeth  arrived on time to the group. She came with a topic and positive feedback to a group member that gave her \"a good suggestion\" and she was actively involved in group today.   .          Plan: Continue weekly group therapy to work on goals. See treatment plan.    "

## 2017-07-18 ENCOUNTER — OFFICE VISIT (OUTPATIENT)
Dept: PSYCHIATRY | Facility: CLINIC | Age: 62
End: 2017-07-18
Attending: PSYCHOLOGIST
Payer: MEDICARE

## 2017-07-18 DIAGNOSIS — F41.1 GENERALIZED ANXIETY DISORDER: ICD-10-CM

## 2017-07-18 DIAGNOSIS — F33.1 MAJOR DEPRESSIVE DISORDER, RECURRENT EPISODE, MODERATE (H): Primary | ICD-10-CM

## 2017-07-18 NOTE — PROGRESS NOTES
Group Therapy N = 5 present; 80 min  Diagnoses: Major Depression (F33.0), Generalized Anxiety (F41.1)   Co-Facilitators: Dr. Megan Gomez, Dr. Era Gonzalez       S/O: Reviewed Homework and what was left over from last week.       Other Topics Discussed:        Other Topics Discussed:   1. Continue to make healthy choices.  Elizabeth discussed the difficulties with eating when cooking for one as well as if you have a special diet like vegetarian.--the monotony of cooking the same dishes. .      2. Importance of face to face connections. The pros/cons of  Facebook or a social network to start connecting to new people but doesn't know how to set up an account.  Careful regarding all or notheing.         3. Re-establishing parental hierarchy--adult children living in the home--the importance of individuation.   Difficulties motivating  adult daughters to help out around the house and not be so dependent.  Elizabeth gave feedback and felt that raising sons was different.      4. Closure--saying good by to the co-therapist Megan. Group members gave positive feedback.       Assessment: Elizabeth arrived on time to the group. She  Was quiet most of the group and initiated more  Feedback then the session prior.         Plan: Continue weekly group therapy to work on goals. See treatment plan.   I

## 2017-07-18 NOTE — MR AVS SNAPSHOT
After Visit Summary   7/18/2017    Elizabeth Palma    MRN: [id number]           Patient Information     Date Of Birth          1955        Visit Information        Provider Department      7/18/2017 10:30 AM Era Gonzalez, PhD  [facility]iatry [facility]        Today's Diagnoses     Major depressive disorder, recurrent episode, moderate (H)    -  1    Generalized anxiety disorder           Follow-ups after your visit        Follow-up notes from your care team     Return in about 1 week (around 7/25/2017).      Your next 10 appointments already scheduled     Aug 14, 2017 11:00 AM CDT   (Arrive by 10:45 AM)   Return Visit with Prakash Irene MD   [facility] ([facility] [place])    [address] [place] [address] [id number] [phone]            Aug 15, 2017  1:00 PM CDT   Adult Med Follow UP with Chaparrita Hatch MD   [facility] Psychiatry ([facility])    [address] [place] [phone]            Aug 21, 2017 10:00 AM CDT   Return Visit with Javier Tuttle DPM   [facility] ([facility])    [address] [place] [id number] [phone]            Aug 25, 2017  9:00 AM CDT   Lab with [facility]    Health Lab ([facility])    [address] [place] [address] [id number] [phone]            Aug 25, 2017  9:30 AM CDT   NUTRITION VISIT with Francesca Danielle RD   [facility] Surgical [facility] Management ([facility])    [address] [place] [address] [id number] [phone]            Sep 15, 2017  9:00 AM CDT   Lab with [facility]    [facility] Lab ([facility])    [address] [place] [id number] [phone]            Sep 15, 2017  9:30 AM CDT   NUTRITION VISIT with SUJATA Stapleton  [facility] Surgical [facility] Management ([facility])    [address] [place] [phone]   [phone]            Oct 09, 2017  9:00 AM CDT   (Arrive by 8:45 AM)   Return Visit with Horacio Sheridan MD   [facility] ([facility])    [address] [place] [address] [id number] [phone]            Oct 13, 2017  9:30 AM CDT   NUTRITION VISIT with Francesca Danielle RD   [facility] ([facility])    [address] [place] [address] [phone] [phone]            Nov 17, 2017  9:30 AM CST   NUTRITION VISIT with Francesca Danielle RD   [facility] ([facility])    [address] [place] [id number] [phone]              Who to contact     Please call your clinic at [phone] to:    Ask questions about your health    Make or cancel appointments    Discuss your medicines    Learn about your test results    Speak to your doctor   If you have compliments or concerns about an experience at your clinic, or if you wish to file a complaint, please contact [facility] Patient Relations at [phone] or email us at [email or web address]         Additional Information About Your Visit        MyChart Information     [employer] gives you secure access to your electronic health record. If you see a primary care provider, you can also send messages to your care team and make appointments. If you have questions, please call your primary care clinic.  If you do not have a primary care provider, please call [phone] and they will assist you.      [employer] is an electronic gateway that provides easy, online access to your medical records. With [employer], you can request a clinic appointment, read your test results, renew a prescription or  communicate with your care team.     To access your existing account, please contact your [facility] or call [phone] for assistance.        Care EveryWhere ID     This is your Care EveryWhere ID. This could be used by other organizations to access your [facility] medical records  [id number]         Blood Pressure from Last 3 Encounters:   07/11/17 135/79   06/07/17 105/73   06/06/17 162/79    Weight from Last 3 Encounters:   07/14/17 78.1 kg (172 lb 3.2 oz)   07/11/17 78.6 kg (173 lb 3.2 oz)   06/16/17 78.8 kg (173 lb 12.8 oz)              Today, you had the following     No orders found for display         Today's Medication Changes          These changes are accurate as of: 7/18/17 11:59 PM.  If you have any questions, ask your nurse or doctor.               These medicines have changed or have updated prescriptions.        Dose/Directions    cyanocobalamin 1000 MCG tablet   Commonly known as:  vitamin  B-12   This may have changed:  when to take this   Used for:  CKD (chronic kidney disease) stage 5, GFR less than 15 ml/min (H)        Dose:  1000 mcg   Take 1 tablet by mouth daily.   Quantity:  30 tablet   Refills:  0       econazole nitrate 1 % cream   This may have changed:    - when to take this  - reasons to take this   Used for:  DM neuro manif type II, Dermatophytosis of foot        Apply topically daily   Quantity:  85 g   Refills:  3                Primary Care Provider Office Phone # Fax #    Horacio Sheridan [phone]        [facility] [address] [place] [address] [phone]  [facility] [address]        Equal Access to Services     [facility] GWEN : Hadii riky richter Somartín, waaxda luqadaha, qaybta kaalmaty guillermo . So [facility] [phone].    ATENCIÓN: Si habla español, tiene a goetz disposición servicios gratuitos de asistencia lingüística. Llame al [phone].    We comply with applicable federal civil rights laws and  [place] laws. We do not discriminate on the basis of race, color, national origin, age, disability sex, sexual orientation or gender identity.            Thank you!     Thank you for choosing [facility]  for your care. Our goal is always to provide you with excellent care. Hearing back from our patients is one way we can continue to improve our services. Please take a few minutes to complete the written survey that you may receive in the mail after your visit with us. Thank you!             Your Updated Medication List - Protect others around you: Learn how to safely use, store and throw away your medicines at www.disposemymeds.org.          This list is accurate as of: 7/18/17 11:59 PM.  Always use your most recent med list.                   Brand Name Dispense Instructions for use Diagnosis    acetylcysteine 600 MG Caps capsule    N-ACETYL CYSTEINE    30 capsule    Take 2 400 mg caps two times daily for total daily dose of 800 mg        albuterol 108 (90 BASE) MCG/ACT Inhaler    PROAIR HFA/PROVENTIL HFA/VENTOLIN HFA    1 Inhaler    Inhale 2 puffs into the lungs every 6 hours as needed for shortness of breath / dyspnea or wheezing    Exercise-induced asthma       ARIPiprazole 2 MG tablet    ABILIFY    15 tablet    Take 0.5 tablets (1 mg) by mouth daily    Major depressive disorder, recurrent episode, moderate (H)       aspirin 81 MG EC tablet     30 tablet    Take 1 tablet (81 mg) by mouth daily    Kidney replaced by transplant       BENADRYL 25 MG capsule   Generic drug:  diphenhydrAMINE      Take 25 mg by mouth At Bedtime.        blood glucose monitoring lancets     1 Box    Use to test blood sugar 2 times daily or as directed.    Type 2 diabetes mellitus with peripheral neuropathy (H)       * blood glucose monitoring meter device kit    no brand specified    1 kit    Use to test blood sugar 2 times daily or as directed.    Type 2 diabetes mellitus with peripheral neuropathy (H)       * blood glucose  monitoring meter device kit           blood glucose monitoring test strip    no brand specified    100 strip    Use to test blood sugars 2 times daily or as directed    Type 2 diabetes mellitus with peripheral neuropathy (H)       cyanocobalamin 1000 MCG tablet    vitamin  B-12    30 tablet    Take 1 tablet by mouth daily.    CKD (chronic kidney disease) stage 5, GFR less than 15 ml/min (H)       cycloSPORINE modified 25 MG capsule    GENERIC EQUIVALENT    240 capsule    Take 4 capsules (100 mg) by mouth 2 times daily    Kidney replaced by transplant       econazole nitrate 1 % cream     85 g    Apply topically daily    DM neuro manif type II, Dermatophytosis of foot       furosemide 20 MG tablet    LASIX    90 tablet    Take 1 tablet (20 mg) by mouth daily    Generalized edema       latanoprost 0.005 % ophthalmic solution    XALATAN    2.5 mL    Place 1 drop into both eyes At Bedtime    Mild stage glaucoma       metFORMIN 500 MG 24 hr tablet    GLUCOPHAGE-XR    90 tablet    Take 3 tablets (1,500 mg) by mouth daily (with dinner)    Type 2 diabetes mellitus with diabetic polyneuropathy, without long-term current use of insulin (H)       mycophenolate 250 MG capsule    CELLCEPT - GENERIC EQUIVALENT    240 capsule    Take 4 capsules (1,000 mg) by mouth 2 times daily    Kidney transplanted       omeprazole 40 MG capsule    priLOSEC    90 capsule    Take 1 capsule (40 mg) by mouth daily    Gastroesophageal reflux disease without esophagitis       ondansetron 4 MG ODT tab    ZOFRAN-ODT    20 tablet    Take 1 tablet (4 mg) by mouth every 6 hours as needed    Chronic kidney disease, stage V (H)       prazosin 5 MG capsule    MINIPRESS    90 capsule    Take 3 capsules (15 mg) by mouth At Bedtime    Nightmares associated with chronic post-traumatic stress disorder       REFRESH OP      Apply to eye as needed Both eyes        replens Gel     35 g    Use vaginally as needed. Can use up to 3 times per week.    Vaginal dryness        simvastatin 20 MG tablet    ZOCOR    90 tablet    Take 1 tablet (20 mg) by mouth At Bedtime    Chronic kidney disease, stage V (H)       sulfamethoxazole-trimethoprim 400-80 MG per tablet    BACTRIM/SEPTRA    90 tablet    Take 1 tablet by mouth daily    Immunosuppression (H)       topiramate 200 MG tablet    TOPAMAX    60 tablet    Take 1 tablet (200 mg) by mouth 2 times daily    Morbid obesity due to excess calories (H)       TYLENOL 325 MG tablet   Generic drug:  acetaminophen      Take 325-650 mg by mouth as needed        vilazodone 40 MG Tabs tablet    VIIBRYD    30 tablet    Take 1 tablet (40 mg) by mouth daily    Major depressive disorder, recurrent episode, moderate (H)       vitamin D 1000 UNITS capsule     30 capsule    2,000 Units        * Notice:  This list has 2 medication(s) that are the same as other medications prescribed for you. Read the directions carefully, and ask your doctor or other care provider to review them with you.
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
## 2017-08-01 ENCOUNTER — OFFICE VISIT (OUTPATIENT)
Dept: PSYCHIATRY | Facility: CLINIC | Age: 62
End: 2017-08-01
Attending: PSYCHOLOGIST
Payer: MEDICARE

## 2017-08-01 DIAGNOSIS — F43.10 POSTTRAUMATIC STRESS DISORDER: ICD-10-CM

## 2017-08-01 DIAGNOSIS — F33.1 MAJOR DEPRESSIVE DISORDER, RECURRENT EPISODE, MODERATE (H): Primary | ICD-10-CM

## 2017-08-01 DIAGNOSIS — F41.1 GENERALIZED ANXIETY DISORDER: ICD-10-CM

## 2017-08-01 NOTE — MR AVS SNAPSHOT
After Visit Summary   8/1/2017    Elizabeth Palma    MRN: [id number]           Patient Information     Date Of Birth          1955        Visit Information        Provider Department      8/1/2017 10:30 AM Era Gonzalez, PhD  Psychiatry Clinic        Today's Diagnoses     Major depressive disorder, recurrent episode, moderate (H)    -  1    Generalized anxiety disorder        Posttraumatic stress disorder           Follow-ups after your visit        Follow-up notes from your care team     Return in about 1 week (around 8/8/2017).      Your next 10 appointments already scheduled     Aug 14, 2017 11:00 AM CDT   (Arrive by 10:45 AM)   Return Visit with Prakash Irene MD   [facility] ([facility])    [address]  [address] [facility] [id number] [phone]            Aug 21, 2017 10:00 AM CDT   Return Visit with Javier Tuttle DPM   [facility]    [address] [place] [id number] [phone]            Aug 25, 2017  9:00 AM CDT   Lab with  LAB   [facility] ([facility])    [address]  [address] [facility] [id number] [phone]            Aug 25, 2017  9:30 AM CDT   NUTRITION VISIT with Francesca Danielle RD   [facility] ([facility])    [address] [place] [address]  [address] [facility] [address]   [phone]            Aug 28, 2017 10:00 AM CDT   Family Therapy with Era Gonzalez, PhD    Psychiatry Clinic ([facility])    [address]  [address] [facility] [address]   [phone]            Sep 12, 2017  1:00 PM CDT   Adult Med Follow UP with Chaparrita Hatch MD   [facility] [[email or web address] Psychiatry ([facility])    [address] [place] [id number] [phone]             Sep 15, 2017  9:00 AM CDT   Lab with  LAB   [facility] ([facility])    [address] [facility]  [address] [facility] [id number] [phone]            Sep 15, 2017  9:30 AM CDT   NUTRITION VISIT with Francesca Danielle RD   [facility] ([facility])    [address] [id number] [phone]            Oct 09, 2017  9:00 AM CDT   (Arrive by 8:45 AM)   Return Visit with Horacio Sheridan MD   [facility] ([facility])    [address] [facility]  [address] [facility] [address] [phone]            Oct 13, 2017  9:30 AM CDT   NUTRITION VISIT with Francesca Danielle RD   [facility] ([facility])    [address] [address] [phone]              Who to contact     Please call your clinic at [phone] to:    Ask questions about your health    Make or cancel appointments    Discuss your medicines    Learn about your test results    Speak to your doctor   If you have compliments or concerns about an experience at your clinic, or if you wish to file a complaint, please contact [facility] Patient Relations at [phone] or email us at [email or web address]         Additional Information About Your Visit        [employer] Information     [employer] gives you secure access to your electronic health record. If you see a primary care provider, you can also send messages to your care team and make appointments. If you have questions, please call your primary care clinic.  If you do not have a primary care provider, please call [phone] and they will assist you.      [employer] is an electronic gateway that provides easy, online access to your medical records. With [employer], you can request a clinic appointment, read your test results, renew  a prescription or communicate with your care team.     To access your existing account, please contact your [facility] or call [phone] for assistance.        Care EveryWhere ID     This is your Care EveryWhere ID. This could be used by other organizations to access your [facility] medical records  [id number]         Blood Pressure from Last 3 Encounters:   08/08/17 130/77   07/11/17 135/79   06/07/17 105/73    Weight from Last 3 Encounters:   08/08/17 78 kg (172 lb)   07/14/17 78.1 kg (172 lb 3.2 oz)   07/11/17 78.6 kg (173 lb 3.2 oz)              Today, you had the following     No orders found for display         Today's Medication Changes          These changes are accurate as of: 8/1/17 11:59 PM.  If you have any questions, ask your nurse or doctor.               These medicines have changed or have updated prescriptions.        Dose/Directions    cyanocobalamin 1000 MCG tablet   Commonly known as:  vitamin  B-12   This may have changed:  when to take this   Used for:  CKD (chronic kidney disease) stage 5, GFR less than 15 ml/min (H)        Dose:  1000 mcg   Take 1 tablet by mouth daily.   Quantity:  30 tablet   Refills:  0       econazole nitrate 1 % cream   This may have changed:    - when to take this  - reasons to take this   Used for:  DM neuro manif type II, Dermatophytosis of foot        Apply topically daily   Quantity:  85 g   Refills:  3                Primary Care Provider Office Phone # Fax #    Horacio Sheridan [phone]       [address] [place] [address]        Equal Access to Services     LINNEA HIDALGO : Hadpenny Quevedo, wareedda luqrasheeda, qaybta kaalty ghorta. So [facility] [phone].    ATENCIÓN: Si habla español, tiene a goetz disposición servicios gratuitos de asistencia lingüística. Llame al [phone].    We comply with applicable federal civil rights laws and [place]  laws. We do not discriminate on the basis of race, color, national origin, age, disability sex, sexual orientation or gender identity.            Thank you!     Thank you for choosing PSYCHIATRY CLINIC  for your care. Our goal is always to provide you with excellent care. Hearing back from our patients is one way we can continue to improve our services. Please take a few minutes to complete the written survey that you may receive in the mail after your visit with us. Thank you!             Your Updated Medication List - Protect others around you: Learn how to safely use, store and throw away your medicines at www.disposemymeds.org.          This list is accurate as of: 8/1/17 11:59 PM.  Always use your most recent med list.                   Brand Name Dispense Instructions for use Diagnosis    acetylcysteine 600 MG Caps capsule    N-ACETYL CYSTEINE    30 capsule    Take 2 400 mg caps two times daily for total daily dose of 800 mg        albuterol 108 (90 BASE) MCG/ACT Inhaler    PROAIR HFA/PROVENTIL HFA/VENTOLIN HFA    1 Inhaler    Inhale 2 puffs into the lungs every 6 hours as needed for shortness of breath / dyspnea or wheezing    Exercise-induced asthma       aspirin 81 MG EC tablet     30 tablet    Take 1 tablet (81 mg) by mouth daily    Kidney replaced by transplant       BENADRYL 25 MG capsule   Generic drug:  diphenhydrAMINE      Take 25 mg by mouth At Bedtime.        blood glucose monitoring lancets     1 Box    Use to test blood sugar 2 times daily or as directed.    Type 2 diabetes mellitus with peripheral neuropathy (H)       * blood glucose monitoring meter device kit    no brand specified    1 kit    Use to test blood sugar 2 times daily or as directed.    Type 2 diabetes mellitus with peripheral neuropathy (H)       * blood glucose monitoring meter device kit           blood glucose monitoring test strip    no brand specified    100 strip    Use to test blood sugars 2 times daily or as directed     Type 2 diabetes mellitus with peripheral neuropathy (H)       cyanocobalamin 1000 MCG tablet    vitamin  B-12    30 tablet    Take 1 tablet by mouth daily.    CKD (chronic kidney disease) stage 5, GFR less than 15 ml/min (H)       cycloSPORINE modified 25 MG capsule    GENERIC EQUIVALENT    240 capsule    Take 4 capsules (100 mg) by mouth 2 times daily    Kidney replaced by transplant       econazole nitrate 1 % cream     85 g    Apply topically daily    DM neuro manif type II, Dermatophytosis of foot       furosemide 20 MG tablet    LASIX    90 tablet    Take 1 tablet (20 mg) by mouth daily    Generalized edema       latanoprost 0.005 % ophthalmic solution    XALATAN    2.5 mL    Place 1 drop into both eyes At Bedtime    Mild stage glaucoma       metFORMIN 500 MG 24 hr tablet    GLUCOPHAGE-XR    90 tablet    Take 3 tablets (1,500 mg) by mouth daily (with dinner)    Type 2 diabetes mellitus with diabetic polyneuropathy, without long-term current use of insulin (H)       mycophenolate 250 MG capsule    GENERIC EQUIVALENT    240 capsule    Take 4 capsules (1,000 mg) by mouth 2 times daily    Kidney transplanted       omeprazole 40 MG capsule    priLOSEC    90 capsule    Take 1 capsule (40 mg) by mouth daily    Gastroesophageal reflux disease without esophagitis       ondansetron 4 MG ODT tab    ZOFRAN-ODT    20 tablet    Take 1 tablet (4 mg) by mouth every 6 hours as needed    Chronic kidney disease, stage V (H)       REFRESH OP      Apply to eye as needed Both eyes        replens Gel     35 g    Use vaginally as needed. Can use up to 3 times per week.    Vaginal dryness       simvastatin 20 MG tablet    ZOCOR    90 tablet    Take 1 tablet (20 mg) by mouth At Bedtime    Chronic kidney disease, stage V (H)       sulfamethoxazole-trimethoprim 400-80 MG per tablet    BACTRIM/SEPTRA    90 tablet    Take 1 tablet by mouth daily    Immunosuppression (H)       topiramate 200 MG tablet    TOPAMAX    60 tablet    Take 1 tablet  (200 mg) by mouth 2 times daily    Morbid obesity due to excess calories (H)       TYLENOL 325 MG tablet   Generic drug:  acetaminophen      Take 325-650 mg by mouth as needed        vitamin D 1000 UNITS capsule     30 capsule    2,000 Units        * Notice:  This list has 2 medication(s) that are the same as other medications prescribed for you. Read the directions carefully, and ask your doctor or other care provider to review them with you.

## 2017-08-07 ENCOUNTER — TELEPHONE (OUTPATIENT)
Dept: PSYCHIATRY | Facility: CLINIC | Age: 62
End: 2017-08-07

## 2017-08-07 NOTE — PROGRESS NOTES
"Group Therapy N = 6 present; 70 min  Diagnoses: MDD (F33.1); NELDA(F41,1) PTSD (F43.10)  Co-Facilitators: Murphy Gonzalez, Steph Marquis, and Yosef Pacheco      S/O:  Reviewed Homework and what was left over from last week and set new homework.       Other Topics Discussed:      1. Suicidal thoughts, Commitment to living  The topic was presented by facilitators to encourage sharing thoughts of self harm and suicide. This was done in order to assess and help individuals with these thoughts and feelings. Elizabeth expressed her commitment to family and her interest to work in the [employer] as good reasons for her to distract from such thoughts and commit to living. She identified her family as the source of support and her commitment to living is 10/10.    2. Treatment plan  Treatment plan was reviewed and updated for a few members in the group. Elizabeth was one of them today. Please see  updated Treatment plan     3. Regulating emotion/Interpersonal communication  Elizabeth reported that she talked to her boss about wanting to be included in meetings, to which the boss agreed. She is glad that she has the invitation now but wonders why she was not included in the meeting a while ago. Although she is reasonably happy with the outcome \"I am glad, but....\", it is not fully satisfactory. Elizabeth also talked at length about the camping trip with her . He wanted to be in charge of planning and packing, including the food. When they reached the camp site Elizabeth felt that there was barely enough for one meal. She felt that her  packed \"enough for himself and wasn't even thinking about me\". She was very upset and cried a lot the entire time. The group tried to walk through the scenario and wondered why there might not have been enough food and how to problem solve without being emotional. Elizabeth identified as her homework working on her relationship with her  AND focus on limiting negative thoughts (or nags) . She wishes " "to setup a couple's therapy session in clinic.    Assessment: Elizabeth arrived on time to the group. Elizabeth was actively involved in group initiating topics/her issues at the beginning of the group and supporting other group members appropriately.   Elizabeth seemed to have limited insight into the inpact of  Her \"naging\" or negative commits and course''\"     Plan: Continue weekly group therapy to work on goals. See updated  treatment plan.  She left without signing the treatment plan signature plan so we will do it next week.      Yosef Pacheco MD  PGY3 [facility]     I was present for and actively participated in the entire group therapy session, my observations of Elizabeth Palma s progress and participation in the group  are that she got really good feedback regarding her style of communication with her  focusing on negatives.they encouraged her to look at \"we do things different and look for what he does to help her.\" .    Era Gonzalez, PhD. LP  "

## 2017-08-08 ENCOUNTER — OFFICE VISIT (OUTPATIENT)
Dept: PSYCHIATRY | Facility: CLINIC | Age: 62
End: 2017-08-08

## 2017-08-08 ENCOUNTER — OFFICE VISIT (OUTPATIENT)
Dept: PSYCHIATRY | Facility: CLINIC | Age: 62
End: 2017-08-08
Attending: PSYCHOLOGIST
Payer: MEDICARE

## 2017-08-08 VITALS
SYSTOLIC BLOOD PRESSURE: 130 MMHG | DIASTOLIC BLOOD PRESSURE: 77 MMHG | HEIGHT: 61 IN | BODY MASS INDEX: 32.47 KG/M2 | HEART RATE: 68 BPM | WEIGHT: 172 LBS

## 2017-08-08 DIAGNOSIS — F33.1 MAJOR DEPRESSIVE DISORDER, RECURRENT EPISODE, MODERATE (H): Primary | ICD-10-CM

## 2017-08-08 DIAGNOSIS — F43.12 NIGHTMARES ASSOCIATED WITH CHRONIC POST-TRAUMATIC STRESS DISORDER: ICD-10-CM

## 2017-08-08 DIAGNOSIS — F41.1 GENERALIZED ANXIETY DISORDER: ICD-10-CM

## 2017-08-08 DIAGNOSIS — F51.5 NIGHTMARES ASSOCIATED WITH CHRONIC POST-TRAUMATIC STRESS DISORDER: ICD-10-CM

## 2017-08-08 DIAGNOSIS — F43.10 POSTTRAUMATIC STRESS DISORDER: ICD-10-CM

## 2017-08-08 RX ORDER — PRAZOSIN HYDROCHLORIDE 5 MG/1
15 CAPSULE ORAL AT BEDTIME
Qty: 90 CAPSULE | Refills: 3 | Status: SHIPPED | OUTPATIENT
Start: 2017-08-08 | End: 2017-11-14

## 2017-08-08 RX ORDER — ARIPIPRAZOLE 2 MG/1
1 TABLET ORAL DAILY
Qty: 15 TABLET | Refills: 3 | Status: SHIPPED | OUTPATIENT
Start: 2017-08-08 | End: 2017-11-14

## 2017-08-08 RX ORDER — VILAZODONE HYDROCHLORIDE 40 MG/1
40 TABLET ORAL DAILY
Qty: 30 TABLET | Refills: 3 | Status: SHIPPED | OUTPATIENT
Start: 2017-08-08 | End: 2017-11-14

## 2017-08-08 ASSESSMENT — PATIENT HEALTH QUESTIONNAIRE - PHQ9: SUM OF ALL RESPONSES TO PHQ QUESTIONS 1-9: 11

## 2017-08-08 NOTE — MR AVS SNAPSHOT
After Visit Summary   8/8/2017    Elizabeth Palma    MRN: [id number]           Patient Information     Date Of Birth          1955        Visit Information        Provider Department      8/8/2017 10:30 AM Era Gonzalez, PhD  Psychiatry Clinic        Today's Diagnoses     Major depressive disorder, recurrent episode, moderate (H)    -  1    Generalized anxiety disorder        Posttraumatic stress disorder           Follow-ups after your visit        Your next 10 appointments already scheduled     Aug 14, 2017 11:00 AM CDT   (Arrive by 10:45 AM)   Return Visit with Prakash Irene MD   [facility] ([facility])    [address] [place] [address]  4th [facility] [address] [phone]            Aug 21, 2017 10:00 AM CDT   Return Visit with Javier Tuttle DPM   [facility] ([facility])    [address] [place] [id number] [phone]            Aug 25, 2017  9:00 AM CDT   Lab with SHANNA[facility]   [facility] Lab ([facility])    [address] [place] [address]  1st [facility] [address] [phone]            Aug 25, 2017  9:30 AM CDT   NUTRITION VISIT with Francesca Danielle RD   [facility] ([facility])    [address] [place] [address]  4th [facility] [address] [phone]            Aug 28, 2017 10:00 AM CDT   Family Therapy with Era Gonzalez, PhD    Psychiatry Clinic ([facility])    [place] [id number]  [address] [facility] [address]   [phone]            Sep 12, 2017  1:00 PM CDT   Adult Med Follow UP with Chaparrita Hatch MD   [facility] ([facility])    [address] [id number] [phone]            Sep 15, 2017  9:00 AM CDT   Lab with [facility]    Health Lab ([facility])    [address] [place] [address]   [phone]            Sep 15, 2017  9:30 AM CDT   NUTRITION VISIT with SUJATA [facility] Surgical [facility] ([facility])    [address] [place] [id number] [phone]            Oct 09, 2017  9:00 AM CDT   (Arrive by 8:45 AM)   Return Visit with Horacio Sheridan MD   [facility] ([facility])    [address] [place] [id number] [phone]            Oct 13, 2017  9:30 AM CDT   NUTRITION VISIT with SUJATA [facility] ([facility])    [address] [place] [id number] [phone]              Who to contact     Please call your clinic at [phone] to:    Ask questions about your health    Make or cancel appointments    Discuss your medicines    Learn about your test results    Speak to your doctor   If you have compliments or concerns about an experience at your clinic, or if you wish to file a complaint, please contact [facility] Patient Relations at [phone] or email us at [email or web address]         Additional Information About Your Visit        MyChart Information     [employer] gives you secure access to your electronic health record. If you see a primary care provider, you can also send messages to your care team and make appointments. If you have questions, please call your primary care clinic.  If you do not have a primary care provider, please call [phone] and they will assist you.      [employer] is an electronic gateway that provides easy, online access to your medical records. With [employer], you can request a clinic appointment, read your test results, renew a prescription or communicate with your care team.     To access your existing  account, please contact your [facility] or call [phone] for assistance.        Care EveryWhere ID     This is your Care EveryWhere ID. This could be used by other organizations to access your [facility] medical records  [id number]         Blood Pressure from Last 3 Encounters:   08/08/17 130/77   07/11/17 135/79   06/07/17 105/73    Weight from Last 3 Encounters:   08/08/17 78 kg (172 lb)   07/14/17 78.1 kg (172 lb 3.2 oz)   07/11/17 78.6 kg (173 lb 3.2 oz)              Today, you had the following     No orders found for display         Today's Medication Changes          These changes are accurate as of: 8/8/17 11:59 PM.  If you have any questions, ask your nurse or doctor.               These medicines have changed or have updated prescriptions.        Dose/Directions    cyanocobalamin 1000 MCG tablet   Commonly known as:  vitamin  B-12   This may have changed:  when to take this   Used for:  CKD (chronic kidney disease) stage 5, GFR less than 15 ml/min (H)        Dose:  1000 mcg   Take 1 tablet by mouth daily.   Quantity:  30 tablet   Refills:  0       econazole nitrate 1 % cream   This may have changed:    - when to take this  - reasons to take this   Used for:  DM neuro manif type II, Dermatophytosis of foot        Apply topically daily   Quantity:  85 g   Refills:  3            Where to get your medicines      These medications were sent to [id number] [address] [address]     Phone:  [phone]     ARIPiprazole 2 MG tablet         These medications were sent to [id number] - [place] - [address] [facility] [address]  [address], [place] [address]    Hours:  24-hours Phone:  [phone]     prazosin 5 MG capsule    vilazodone 40 MG Tabs tablet                Primary Care Provider Office Phone # Fax #    Horacio Sheridan [phone] [address]  [facility] [address]  [facility] [address]        Equal Access to Services     LINNEA HIDALGO : Hadii aad ku hadnatanchhaya Sudhamartín, wareedda daniebuddyha, qacarmenvee oconnorlluviajuly horowitz, ty joshitannercoy fam. So [facility] [phone].    ATENCIÓN: Si habla español, tiene a goetz disposición servicios gratuitos de asistencia lingüística. [facility] [phone].    We comply with applicable federal civil rights laws and [place] laws. We do not discriminate on the basis of race, color, national origin, age, disability sex, sexual orientation or gender identity.            Thank you!     Thank you for choosing PSYCHIATRY CLINIC  for your care. Our goal is always to provide you with excellent care. Hearing back from our patients is one way we can continue to improve our services. Please take a few minutes to complete the written survey that you may receive in the mail after your visit with us. Thank you!             Your Updated Medication List - Protect others around you: Learn how to safely use, store and throw away your medicines at www.disposemymeds.org.          This list is accurate as of: 8/8/17 11:59 PM.  Always use your most recent med list.                   Brand Name Dispense Instructions for use Diagnosis    acetylcysteine 600 MG Caps capsule    N-ACETYL CYSTEINE    30 capsule    Take 2 400 mg caps two times daily for total daily dose of 800 mg        albuterol 108 (90 BASE) MCG/ACT Inhaler    PROAIR HFA/PROVENTIL HFA/VENTOLIN HFA    1 Inhaler    Inhale 2 puffs into the lungs every 6 hours as needed for shortness of breath / dyspnea or wheezing    Exercise-induced asthma       ARIPiprazole 2 MG tablet    ABILIFY    15 tablet    Take 0.5 tablets (1 mg) by mouth daily    Major depressive disorder, recurrent episode, moderate (H)       aspirin 81 MG EC tablet     30 tablet    Take 1 tablet (81 mg) by mouth daily    Kidney replaced by transplant       BENADRYL 25 MG capsule   Generic drug:  diphenhydrAMINE      Take  25 mg by mouth At Bedtime.        blood glucose monitoring lancets     1 Box    Use to test blood sugar 2 times daily or as directed.    Type 2 diabetes mellitus with peripheral neuropathy (H)       * blood glucose monitoring meter device kit    no brand specified    1 kit    Use to test blood sugar 2 times daily or as directed.    Type 2 diabetes mellitus with peripheral neuropathy (H)       * blood glucose monitoring meter device kit           blood glucose monitoring test strip    no brand specified    100 strip    Use to test blood sugars 2 times daily or as directed    Type 2 diabetes mellitus with peripheral neuropathy (H)       cyanocobalamin 1000 MCG tablet    vitamin  B-12    30 tablet    Take 1 tablet by mouth daily.    CKD (chronic kidney disease) stage 5, GFR less than 15 ml/min (H)       cycloSPORINE modified 25 MG capsule    GENERIC EQUIVALENT    240 capsule    Take 4 capsules (100 mg) by mouth 2 times daily    Kidney replaced by transplant       econazole nitrate 1 % cream     85 g    Apply topically daily    DM neuro manif type II, Dermatophytosis of foot       furosemide 20 MG tablet    LASIX    90 tablet    Take 1 tablet (20 mg) by mouth daily    Generalized edema       latanoprost 0.005 % ophthalmic solution    XALATAN    2.5 mL    Place 1 drop into both eyes At Bedtime    Mild stage glaucoma       metFORMIN 500 MG 24 hr tablet    GLUCOPHAGE-XR    90 tablet    Take 3 tablets (1,500 mg) by mouth daily (with dinner)    Type 2 diabetes mellitus with diabetic polyneuropathy, without long-term current use of insulin (H)       mycophenolate 250 MG capsule    GENERIC EQUIVALENT    240 capsule    Take 4 capsules (1,000 mg) by mouth 2 times daily    Kidney transplanted       omeprazole 40 MG capsule    priLOSEC    90 capsule    Take 1 capsule (40 mg) by mouth daily    Gastroesophageal reflux disease without esophagitis       ondansetron 4 MG ODT tab    ZOFRAN-ODT    20 tablet    Take 1 tablet (4 mg) by  mouth every 6 hours as needed    Chronic kidney disease, stage V (H)       prazosin 5 MG capsule    MINIPRESS    90 capsule    Take 3 capsules (15 mg) by mouth At Bedtime    Nightmares associated with chronic post-traumatic stress disorder       REFRESH OP      Apply to eye as needed Both eyes        replens Gel     35 g    Use vaginally as needed. Can use up to 3 times per week.    Vaginal dryness       simvastatin 20 MG tablet    ZOCOR    90 tablet    Take 1 tablet (20 mg) by mouth At Bedtime    Chronic kidney disease, stage V (H)       sulfamethoxazole-trimethoprim 400-80 MG per tablet    BACTRIM/SEPTRA    90 tablet    Take 1 tablet by mouth daily    Immunosuppression (H)       topiramate 200 MG tablet    TOPAMAX    60 tablet    Take 1 tablet (200 mg) by mouth 2 times daily    Morbid obesity due to excess calories (H)       TYLENOL 325 MG tablet   Generic drug:  acetaminophen      Take 325-650 mg by mouth as needed        vilazodone 40 MG Tabs tablet    VIIBRYD    30 tablet    Take 1 tablet (40 mg) by mouth daily    Major depressive disorder, recurrent episode, moderate (H)       vitamin D 1000 UNITS capsule     30 capsule    2,000 Units        * Notice:  This list has 2 medication(s) that are the same as other medications prescribed for you. Read the directions carefully, and ask your doctor or other care provider to review them with you.

## 2017-08-08 NOTE — PROGRESS NOTES
"Group Therapy N = 6 present; 70 min  Diagnoses: MDD (F33.1); NELDA(F41,1) PTSD (F43.10)  Co-Facilitators: Murphy Gonzalez, Steph Marquis, and Yosef Pacheco      S/O:  Reviewed Homework and what was left over from last week and set new homework.       Other Topics Discussed:      1. Health: Life vs Quality of Life  Elizabeth was more attentive during today's group and commented in reassuring way toward a peer who was struggling with issues of difficult medical conditions.  She shared about her own difficult health condition in the past and how she dealt with it.    2. Boundaries with Children  Elizabeth shared openly that she has often cried in front of her two children (both when they were young children and as adults) and their different responses.  Group encouraged pt to have some insight into appropriate boundaries that parents need to set at different point in a pt's life as well as appropriate modeling opportunities for the parent to provide for their child.      3.Friendships  Elizabeth shared that she does not have on-line friends, but she does spend about an hour a day using her phone to play games.  She is working on incorporating more face-to face friendship interactions and does feel that it is important to have a balance with on-line and in-person friendship interactions.    Assessment: Elizabeth arrived on time to the group. She continues to struggle with showing attention while peers are talking, but has been overall contributing to group the group and trying to find a way to \"fit in.\"      Plan: Continue weekly group therapy to work on goals. See treatment plan.         Steph Irwin MD   Psychiatry Resident PGY3    I was present for and actively participated in the entire group therapy session, my observations of Elizabeth Palma s progress and participation are the group really challenged Elizabeth today to look at her negative thinking and talk to her  as she is working on her own style of communication. Some " insight noted as she shared her own experiences dealing with her .     Era Gonzalez, PhD.

## 2017-08-08 NOTE — MR AVS SNAPSHOT
After Visit Summary   8/8/2017    Elizabeth Palma    MRN: [id number]           Patient Information     Date Of Birth          1955        Visit Information        Provider Department      8/8/2017 1:30 PM Chaparrita Hatch MD [facility]        Today's Diagnoses     Major depressive disorder, recurrent episode, moderate (H)        Nightmares associated with chronic post-traumatic stress disorder           Follow-ups after your visit        Follow-up notes from your care team     Return in about 4 weeks (around 9/5/2017) for 30 min. MFU.      Your next 10 appointments already scheduled     Aug 14, 2017 11:00 AM CDT   (Arrive by 10:45 AM)   Return Visit with Prakash Irene MD   [facility] ([facility])    [address] [place] [address]  [address]th [facility] [id number] [phone]            Aug 21, 2017 10:00 AM CDT   Return Visit with Javier Tuttle DPM   [facility] ([facility])    [address] [place] [id number] [phone]            Aug 25, 2017  9:00 AM CDT   Lab with  LAB   [facility] ([facility])    [address] [place] [address]  [address]st [facility] [id number] [phone]            Aug 25, 2017  9:30 AM CDT   NUTRITION VISIT with Francesca Danielle RD   [facility] ([facility])    [address] [place] [address]  4th [facility] [id number] [phone]            Aug 28, 2017 10:00 AM CDT   Family Therapy with Era Gonzalez, PhD    Psychiatry Clinic ([facility])    [address]  [address] [facility] [phone]            Sep 12, 2017  1:00 PM CDT   Adult Med Follow UP with Chaparrita Hatch MD   [facility] ([facility])    [address] [id number] [phone]             Sep 15, 2017  9:00 AM CDT   Lab with  LAB   [facility] ([facility])    [address] [facility]  1st [facility] [id number] [phone]            Sep 15, 2017  9:30 AM CDT   NUTRITION VISIT with Francesca Danielle RD   [facility] ([facility])    [address] [facility]  4th [facility] [id number] [phone]            Oct 09, 2017  9:00 AM CDT   (Arrive by 8:45 AM)   Return Visit with Horacio Sheridan MD   [facility] ([facility])    [address] [facility]  4th [facility] [address] [phone]            Oct 13, 2017  9:30 AM CDT   NUTRITION VISIT with Francesca Danielle RD   [facility] ([facility])    [address] [address] [phone]              Who to contact     Please call your clinic at [phone] to:    Ask questions about your health    Make or cancel appointments    Discuss your medicines    Learn about your test results    Speak to your doctor   If you have compliments or concerns about an experience at your clinic, or if you wish to file a complaint, please contact [facility] Patient Relations at [phone] or email us at [email or web address]         Additional Information About Your Visit        [employer] Information     [employer] gives you secure access to your electronic health record. If you see a primary care provider, you can also send messages to your care team and make appointments. If you have questions, please call your primary care clinic.  If you do not have a primary care provider, please call [phone] and they will assist you.      [employer] is an electronic gateway that provides easy, online access to your medical records. With [employer], you can request a clinic appointment, read your test  results, renew a prescription or communicate with your care team.     To access your existing account, please contact your [facility] or call [phone] for assistance.        Care EveryWhere ID     This is your Care EveryWhere ID. This could be used by other organizations to access your [place] medical records  [id number]         Blood Pressure from Last 3 Encounters:   07/11/17 135/79   06/07/17 105/73   06/06/17 162/79    Weight from Last 3 Encounters:   07/14/17 172 lb 3.2 oz (78.1 kg)   07/11/17 173 lb 3.2 oz (78.6 kg)   06/16/17 173 lb 12.8 oz (78.8 kg)              Today, you had the following     No orders found for display         Today's Medication Changes          These changes are accurate as of: 8/8/17  2:07 PM.  If you have any questions, ask your nurse or doctor.               These medicines have changed or have updated prescriptions.        Dose/Directions    cyanocobalamin 1000 MCG tablet   Commonly known as:  vitamin  B-12   This may have changed:  when to take this   Used for:  CKD (chronic kidney disease) stage 5, GFR less than 15 ml/min (H)        Dose:  1000 mcg   Take 1 tablet by mouth daily.   Quantity:  30 tablet   Refills:  0       econazole nitrate 1 % cream   This may have changed:    - when to take this  - reasons to take this   Used for:  DM neuro manif type II, Dermatophytosis of foot        Apply topically daily   Quantity:  85 g   Refills:  3            Where to get your medicines      These medications were sent to [id number] [address] [address]     Phone:  [phone]     ARIPiprazole 2 MG tablet         These medications were sent to [id number] - [place] - [address] AT [facility] [address]  [address], [place] [address]    Hours:  24-hours Phone:  [phone]     prazosin 5 MG capsule    vilazodone 40 MG Tabs tablet                 Primary Care Provider Office Phone # Fax #    Horacio Sheridan [phone]        [facility] [address] [facility] [address]  [facility] [address]        Equal Access to Services     LINNEA HIDALGO : Hadii riky ku amio Sohenriali, waaxda luqadaha, qaybta kaalmada adeelizabeth, ty padilla laPerlamicah fam. So [facility] [phone].    ATENCIÓN: Si habla español, tiene a goetz disposición servicios gratuitos de asistencia lingüística. Llame al [phone].    We comply with applicable federal civil rights laws and [place] laws. We do not discriminate on the basis of race, color, national origin, age, disability sex, sexual orientation or gender identity.            Thank you!     Thank you for choosing [facility]  for your care. Our goal is always to provide you with excellent care. Hearing back from our patients is one way we can continue to improve our services. Please take a few minutes to complete the written survey that you may receive in the mail after your visit with us. Thank you!             Your Updated Medication List - Protect others around you: Learn how to safely use, store and throw away your medicines at www.disposemymeds.org.          This list is accurate as of: 8/8/17  2:07 PM.  Always use your most recent med list.                   Brand Name Dispense Instructions for use Diagnosis    acetylcysteine 600 MG Caps capsule    N-ACETYL CYSTEINE    30 capsule    Take 2 400 mg caps two times daily for total daily dose of 800 mg        albuterol 108 (90 BASE) MCG/ACT Inhaler    PROAIR HFA/PROVENTIL HFA/VENTOLIN HFA    1 Inhaler    Inhale 2 puffs into the lungs every 6 hours as needed for shortness of breath / dyspnea or wheezing    Exercise-induced asthma       ARIPiprazole 2 MG tablet    ABILIFY    15 tablet    Take 0.5 tablets (1 mg) by mouth daily    Major depressive disorder, recurrent episode, moderate (H)       aspirin 81 MG EC tablet     30 tablet    Take 1 tablet (81 mg) by  mouth daily    Kidney replaced by transplant       BENADRYL 25 MG capsule   Generic drug:  diphenhydrAMINE      Take 25 mg by mouth At Bedtime.        blood glucose monitoring lancets     1 Box    Use to test blood sugar 2 times daily or as directed.    Type 2 diabetes mellitus with peripheral neuropathy (H)       * blood glucose monitoring meter device kit    no brand specified    1 kit    Use to test blood sugar 2 times daily or as directed.    Type 2 diabetes mellitus with peripheral neuropathy (H)       * blood glucose monitoring meter device kit           blood glucose monitoring test strip    no brand specified    100 strip    Use to test blood sugars 2 times daily or as directed    Type 2 diabetes mellitus with peripheral neuropathy (H)       cyanocobalamin 1000 MCG tablet    vitamin  B-12    30 tablet    Take 1 tablet by mouth daily.    CKD (chronic kidney disease) stage 5, GFR less than 15 ml/min (H)       cycloSPORINE modified 25 MG capsule    GENERIC EQUIVALENT    240 capsule    Take 4 capsules (100 mg) by mouth 2 times daily    Kidney replaced by transplant       econazole nitrate 1 % cream     85 g    Apply topically daily    DM neuro manif type II, Dermatophytosis of foot       furosemide 20 MG tablet    LASIX    90 tablet    Take 1 tablet (20 mg) by mouth daily    Generalized edema       latanoprost 0.005 % ophthalmic solution    XALATAN    2.5 mL    Place 1 drop into both eyes At Bedtime    Mild stage glaucoma       metFORMIN 500 MG 24 hr tablet    GLUCOPHAGE-XR    90 tablet    Take 3 tablets (1,500 mg) by mouth daily (with dinner)    Type 2 diabetes mellitus with diabetic polyneuropathy, without long-term current use of insulin (H)       mycophenolate 250 MG capsule    CELLCEPT - GENERIC EQUIVALENT    240 capsule    Take 4 capsules (1,000 mg) by mouth 2 times daily    Kidney transplanted       omeprazole 40 MG capsule    priLOSEC    90 capsule    Take 1 capsule (40 mg) by mouth daily     Gastroesophageal reflux disease without esophagitis       ondansetron 4 MG ODT tab    ZOFRAN-ODT    20 tablet    Take 1 tablet (4 mg) by mouth every 6 hours as needed    Chronic kidney disease, stage V (H)       prazosin 5 MG capsule    MINIPRESS    90 capsule    Take 3 capsules (15 mg) by mouth At Bedtime    Nightmares associated with chronic post-traumatic stress disorder       REFRESH OP      Apply to eye as needed Both eyes        replens Gel     35 g    Use vaginally as needed. Can use up to 3 times per week.    Vaginal dryness       simvastatin 20 MG tablet    ZOCOR    90 tablet    Take 1 tablet (20 mg) by mouth At Bedtime    Chronic kidney disease, stage V (H)       sulfamethoxazole-trimethoprim 400-80 MG per tablet    BACTRIM/SEPTRA    90 tablet    Take 1 tablet by mouth daily    Immunosuppression (H)       topiramate 200 MG tablet    TOPAMAX    60 tablet    Take 1 tablet (200 mg) by mouth 2 times daily    Morbid obesity due to excess calories (H)       TYLENOL 325 MG tablet   Generic drug:  acetaminophen      Take 325-650 mg by mouth as needed        vilazodone 40 MG Tabs tablet    VIIBRYD    30 tablet    Take 1 tablet (40 mg) by mouth daily    Major depressive disorder, recurrent episode, moderate (H)       vitamin D 1000 UNITS capsule     30 capsule    2,000 Units        * Notice:  This list has 2 medication(s) that are the same as other medications prescribed for you. Read the directions carefully, and ask your doctor or other care provider to review them with you.
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
## 2017-08-11 ASSESSMENT — ENCOUNTER SYMPTOMS
NERVOUS/ANXIOUS: 1
INSOMNIA: 1
DECREASED CONCENTRATION: 0
PANIC: 1
DEPRESSION: 1

## 2017-08-14 ENCOUNTER — TELEPHONE (OUTPATIENT)
Dept: NURSING | Facility: CLINIC | Age: 62
End: 2017-08-14

## 2017-08-14 ENCOUNTER — OFFICE VISIT (OUTPATIENT)
Dept: ENDOCRINOLOGY | Facility: CLINIC | Age: 62
End: 2017-08-14

## 2017-08-14 ENCOUNTER — NURSE TRIAGE (OUTPATIENT)
Dept: NURSING | Facility: CLINIC | Age: 62
End: 2017-08-14

## 2017-08-14 VITALS
DIASTOLIC BLOOD PRESSURE: 80 MMHG | HEIGHT: 60 IN | BODY MASS INDEX: 33.16 KG/M2 | SYSTOLIC BLOOD PRESSURE: 120 MMHG | HEART RATE: 74 BPM | OXYGEN SATURATION: 97 % | WEIGHT: 168.9 LBS

## 2017-08-14 DIAGNOSIS — E66.01 MORBID OBESITY DUE TO EXCESS CALORIES (H): Primary | ICD-10-CM

## 2017-08-14 NOTE — LETTER
2017    RE: Elizabeth Palma  [id number]     [place] [address]     Dear Colleague,    Thank you for referring your patient, Elizabeth Palma, to the [facility] MEDICAL WEIGHT MANAGEMENT at [facility]. Please see a copy of my visit note below.        Return Medical Weight Management Note     Elizabeth Palma  MRN:  [id number]  :  1955  AYSE:  2017    Dear Dr. Sheridan,      I had the pleasure of seeing your patient Elizabeth Palma.  She is a 60 year old female who I am continuing to see for treatment of obesity related to: DM-2, Hyperlipidemia, Sleep Apnea (has CPAP and does not use), GERD and Depression.    CURRENT WEIGHT:   168 lbs 14.4 oz    Wt Readings from Last 4 Encounters:   17 76.6 kg (168 lb 14.4 oz)   17 78 kg (172 lb)   17 78.1 kg (172 lb 3.2 oz)   17 78.6 kg (173 lb 3.2 oz)     Body Mass Index:  Body mass index is 32.99 kg/(m^2).  Vitals:  B/P: 119/79, P: 60    Initial consult weight was 214 on 2015.  Weight change since last seen on 2017 is down 8 pounds.   Total loss is 44 pounds.    INTERVAL HISTORY:  Topiramate 200 AM and 200 HS along with off gabapentin, and pain is ok. Still having problems with evening and night eating.     Diet and Activity Changes Since Last Visit Reviewed With Patient 2017   I have made the following changes to my diet since my last visit: Tried to make better choices    With regards to my diet, I am still struggling with: Salty foods    For breakfast, I typically eat: Nothing    For lunch, I typically eat: Half sandwich and veggies; fruit    For supper, I typically eat: Protein and carb    For snack(s), I typically eat: Protein bar; fruit; veggies    I have made the following changes to my activity/exercise since my last visit: Tried to be more active    With regards to my activity/exercise, I am still struggling with: Getting motivated      MEDICATIONS:   Current  Outpatient Prescriptions   Medication     vilazodone (VIIBRYD) 40 MG TABS tablet     prazosin (MINIPRESS) 5 MG capsule     ARIPiprazole (ABILIFY) 2 MG tablet     Vaginal Lubricant (REPLENS) GEL     furosemide (LASIX) 20 MG tablet     blood glucose monitoring (ACCU-CHEK RALPH PLUS) meter device kit     omeprazole (PRILOSEC) 40 MG capsule     cycloSPORINE modified (GENERIC EQUIVALENT) 25 MG capsule     Polyvinyl Alcohol-Povidone (REFRESH OP)     latanoprost (XALATAN) 0.005 % ophthalmic solution     blood glucose monitoring (NO BRAND SPECIFIED) meter device kit     blood glucose monitoring (NO BRAND SPECIFIED) test strip     blood glucose monitoring (SOFTCLIX) lancets     topiramate (TOPAMAX) 200 MG tablet     sulfamethoxazole-trimethoprim (BACTRIM/SEPTRA) 400-80 MG per tablet     mycophenolate (CELLCEPT - GENERIC EQUIVALENT) 250 MG capsule     metFORMIN (GLUCOPHAGE-XR) 500 MG 24 hr tablet     acetylcysteine (N-ACETYL-L-CYSTEINE) 600 MG CAPS capsule     ondansetron (ZOFRAN-ODT) 4 MG disintegrating tablet     albuterol (PROAIR HFA, PROVENTIL HFA, VENTOLIN HFA) 108 (90 BASE) MCG/ACT inhaler     Cholecalciferol (VITAMIN D) 1000 UNITS capsule     econazole nitrate 1 % cream     aspirin EC 81 MG EC tablet     acetaminophen (TYLENOL) 325 MG tablet     cyanocolbalamin (VITAMIN  B-12) 1000 MCG tablet     diphenhydrAMINE (BENADRYL) 25 MG capsule     simvastatin (ZOCOR) 20 MG tablet     No current facility-administered medications for this visit.        Weight Loss Medication History Reviewed With Patient 8/11/2017   Which weight loss medications are you currently taking on a regular basis?  Topamax (topiramate)   Are you having any side effects from the weight loss medication that we have prescribed you? No   If you are having side effects please describe: -     ASSESSMENT:   Good progress this time. Maintain topiramate 200 morning and evening. Now off gabapentin with acceptable neuropathy pain control.    FOLLOW-UP:    12  weeks.  10/15 minutes spent on counseling and education    Sincerely,    Prakash Irene MD

## 2017-08-14 NOTE — NURSING NOTE
No chief complaint on file.      Vitals:    08/14/17 1059   BP: 120/80   Pulse: 74   SpO2: 97%   Weight: 76.6 kg (168 lb 14.4 oz)   Height: 1.524 m (5')       Body mass index is 32.99 kg/(m^2).

## 2017-08-14 NOTE — PROGRESS NOTES
Return Medical Weight Management Note     Elizabeth Palma  MRN:  [id number]  :  1955  AYSE:  2017    Dear Dr. Sheridan,      I had the pleasure of seeing your patient Elizabeth Palma.  She is a 60 year old female who I am continuing to see for treatment of obesity related to: DM-2, Hyperlipidemia, Sleep Apnea (has CPAP and does not use), GERD and Depression.    CURRENT WEIGHT:   168 lbs 14.4 oz    Wt Readings from Last 4 Encounters:   17 76.6 kg (168 lb 14.4 oz)   17 78 kg (172 lb)   17 78.1 kg (172 lb 3.2 oz)   17 78.6 kg (173 lb 3.2 oz)     Body Mass Index:  Body mass index is 32.99 kg/(m^2).  Vitals:  B/P: 119/79, P: 60    Initial consult weight was 214 on 2015.  Weight change since last seen on 2017 is down 8 pounds.   Total loss is 44 pounds.    INTERVAL HISTORY:  Topiramate 200 AM and 200 HS along with off gabapentin, and pain is ok. Still having problems with evening and night eating.     Diet and Activity Changes Since Last Visit Reviewed With Patient 2017   I have made the following changes to my diet since my last visit: Tried to make better choices    With regards to my diet, I am still struggling with: Salty foods    For breakfast, I typically eat: Nothing    For lunch, I typically eat: Half sandwich and veggies; fruit    For supper, I typically eat: Protein and carb    For snack(s), I typically eat: Protein bar; fruit; veggies    I have made the following changes to my activity/exercise since my last visit: Tried to be more active    With regards to my activity/exercise, I am still struggling with: Getting motivated      MEDICATIONS:   Current Outpatient Prescriptions   Medication     vilazodone (VIIBRYD) 40 MG TABS tablet     prazosin (MINIPRESS) 5 MG capsule     ARIPiprazole (ABILIFY) 2 MG tablet     Vaginal Lubricant (REPLENS) GEL     furosemide (LASIX) 20 MG tablet     blood glucose monitoring (ACCU-CHEK RALPH PLUS) meter device kit     omeprazole  (PRILOSEC) 40 MG capsule     cycloSPORINE modified (GENERIC EQUIVALENT) 25 MG capsule     Polyvinyl Alcohol-Povidone (REFRESH OP)     latanoprost (XALATAN) 0.005 % ophthalmic solution     blood glucose monitoring (NO BRAND SPECIFIED) meter device kit     blood glucose monitoring (NO BRAND SPECIFIED) test strip     blood glucose monitoring (SOFTCLIX) lancets     topiramate (TOPAMAX) 200 MG tablet     sulfamethoxazole-trimethoprim (BACTRIM/SEPTRA) 400-80 MG per tablet     mycophenolate (CELLCEPT - GENERIC EQUIVALENT) 250 MG capsule     metFORMIN (GLUCOPHAGE-XR) 500 MG 24 hr tablet     acetylcysteine (N-ACETYL-L-CYSTEINE) 600 MG CAPS capsule     ondansetron (ZOFRAN-ODT) 4 MG disintegrating tablet     albuterol (PROAIR HFA, PROVENTIL HFA, VENTOLIN HFA) 108 (90 BASE) MCG/ACT inhaler     Cholecalciferol (VITAMIN D) 1000 UNITS capsule     econazole nitrate 1 % cream     aspirin EC 81 MG EC tablet     acetaminophen (TYLENOL) 325 MG tablet     cyanocolbalamin (VITAMIN  B-12) 1000 MCG tablet     diphenhydrAMINE (BENADRYL) 25 MG capsule     simvastatin (ZOCOR) 20 MG tablet     No current facility-administered medications for this visit.        Weight Loss Medication History Reviewed With Patient 8/11/2017   Which weight loss medications are you currently taking on a regular basis?  Topamax (topiramate)   Are you having any side effects from the weight loss medication that we have prescribed you? No   If you are having side effects please describe: -     ASSESSMENT:   Good progress this time. Maintain topiramate 200 morning and evening. Now off gabapentin with acceptable neuropathy pain control.    FOLLOW-UP:    12 weeks.  10/15 minutes spent on counseling and education    Sincerely,    Prakash Irene MD

## 2017-08-14 NOTE — MR AVS SNAPSHOT
After Visit Summary   8/14/2017    Elizabeth Palma    MRN: [id number]           Patient Information     Date Of Birth          1955        Visit Information        Provider Department      8/14/2017 11:00 AM Prakash Irene MD [facility] Medical Weight Management        Today's Diagnoses     Morbid obesity due to excess calories (H)    -  1       Follow-ups after your visit        Follow-up notes from your care team     Return in about 3 months (around 11/14/2017).      Your next 10 appointments already scheduled     Aug 21, 2017 10:00 AM CDT   Return Visit with Javier Tuttle DPM   [facility] ([facility])    [address] [place] [address] [phone]            Aug 25, 2017  9:00 AM CDT   Lab with UC LAB   [facility] ([facility])    [address] [place] [address]  1st [facility] [id number] [phone]            Aug 25, 2017  9:30 AM CDT   NUTRITION VISIT with Francesca Danielle RD   [facility] Surgical Weight Management ([facility] [place])    [address] [place] [address]  4th Floor  [facility] [id number] [phone]            Aug 28, 2017 10:00 AM CDT   Family Therapy with Era Gonzalez, PhD    Psychiatry Clinic ([facility])    [place] [id number]  [address] [place] [address]   [phone]            Sep 12, 2017  1:00 PM CDT   Adult Med Follow UP with Chaparrita Hatch MD   [facility] Psychiatry ([facility])    [address] [place] [address] [address]   [phone]            Sep 15, 2017  9:00 AM CDT   Lab with UC LAB    Health Lab ([facility] [place])    [address] [place] [address]  1st Floor  [facility] [id number] [phone]            Sep 15, 2017  9:30 AM CDT   NUTRITION VISIT with SUJATA [facility] Management ([facility])    [address] [place] [id number] [phone]            Oct 09, 2017  9:00 AM CDT   (Arrive by 8:45 AM)   Return Visit with Horacio Sheridan MD   [facility] ([facility])    [address] [place] [id number] [phone]            Oct 13, 2017  9:30 AM CDT   NUTRITION VISIT with Francesca Danielle RD   [facility] ([facility])    [address] [place] [id number] [phone]            Nov 17, 2017  9:30 AM CST   NUTRITION VISIT with Francesca Dnaielle RD   [facility] ([facility])    [address] [place] [id number] [phone]              Who to contact     Please call your clinic at [phone] to:    Ask questions about your health    Make or cancel appointments    Discuss your medicines    Learn about your test results    Speak to your doctor   If you have compliments or concerns about an experience at your clinic, or if you wish to file a complaint, please contact [facility] Patient Relations at [phone] or email us at [email or web address]         Additional Information About Your Visit        MyChart Information     [employer] gives you secure access to your electronic health record. If you see a primary care provider, you can also send messages to your care team and make appointments. If you have questions, please call your primary care clinic.  If you do not have a primary care provider, please call [phone] and they will assist you.      [employer] is an electronic gateway that provides easy, online access to your medical records. With [employer], you can request a clinic appointment, read your test results, renew a prescription or communicate with your care team.     To access your existing  account, please contact your [facility] or call [phone] for assistance.        Care EveryWhere ID     This is your Care EveryWhere ID. This could be used by other organizations to access your [place] medical records  [id number]        Your Vitals Were     Pulse Height Pulse Oximetry BMI (Body Mass Index)          74 1.524 m (5') 97% 32.99 kg/m2         Blood Pressure from Last 3 Encounters:   08/14/17 120/80   08/08/17 130/77   07/11/17 135/79    Weight from Last 3 Encounters:   08/14/17 76.6 kg (168 lb 14.4 oz)   08/08/17 78 kg (172 lb)   07/14/17 78.1 kg (172 lb 3.2 oz)              Today, you had the following     No orders found for display         Today's Medication Changes          These changes are accurate as of: 8/14/17 11:13 AM.  If you have any questions, ask your nurse or doctor.               These medicines have changed or have updated prescriptions.        Dose/Directions    cyanocobalamin 1000 MCG tablet   Commonly known as:  vitamin  B-12   This may have changed:  when to take this   Used for:  CKD (chronic kidney disease) stage 5, GFR less than 15 ml/min (H)        Dose:  1000 mcg   Take 1 tablet by mouth daily.   Quantity:  30 tablet   Refills:  0       econazole nitrate 1 % cream   This may have changed:    - when to take this  - reasons to take this   Used for:  DM neuro manif type II, Dermatophytosis of foot        Apply topically daily   Quantity:  85 g   Refills:  3                Primary Care Provider Office Phone # Fax #    Horacio Sheridan [phone]       [address] [place] [address]        Equal Access to Services     LUCIO HIDALGO AH: Jennifer Quevedo, radha aleman, ty perla. So [facility] [phone].    ATENCIÓN: Si habla español, tiene a goetz disposición servicios gratuitos de asistencia lingüística. Llame al [phone].    We comply with  applicable federal civil rights laws and [place] laws. We do not discriminate on the basis of race, color, national origin, age, disability sex, sexual orientation or gender identity.            Thank you!     Thank you for choosing [facility]ICAL WE[facility] MANAGEMENT  for your care. Our goal is always to provide you with excellent care. Hearing back from our patients is one way we can continue to improve our services. Please take a few minutes to complete the written survey that you may receive in the mail after your visit with us. Thank you!             Your Updated Medication List - Protect others around you: Learn how to safely use, store and throw away your medicines at www.disposemymeds.org.          This list is accurate as of: 8/14/17 11:13 AM.  Always use your most recent med list.                   Brand Name Dispense Instructions for use Diagnosis    acetylcysteine 600 MG Caps capsule    N-ACETYL CYSTEINE    30 capsule    Take 2 400 mg caps two times daily for total daily dose of 800 mg        albuterol 108 (90 BASE) MCG/ACT Inhaler    PROAIR HFA/PROVENTIL HFA/VENTOLIN HFA    1 Inhaler    Inhale 2 puffs into the lungs every 6 hours as needed for shortness of breath / dyspnea or wheezing    Exercise-induced asthma       ARIPiprazole 2 MG tablet    ABILIFY    15 tablet    Take 0.5 tablets (1 mg) by mouth daily    Major depressive disorder, recurrent episode, moderate (H)       aspirin 81 MG EC tablet     30 tablet    Take 1 tablet (81 mg) by mouth daily    Kidney replaced by transplant       BENADRYL 25 MG capsule   Generic drug:  diphenhydrAMINE      Take 25 mg by mouth At Bedtime.        blood glucose monitoring lancets     1 Box    Use to test blood sugar 2 times daily or as directed.    Type 2 diabetes mellitus with peripheral neuropathy (H)       * blood glucose monitoring meter device kit    no brand specified    1 kit    Use to test blood sugar 2 times daily or as directed.    Type 2 diabetes  mellitus with peripheral neuropathy (H)       * blood glucose monitoring meter device kit           blood glucose monitoring test strip    no brand specified    100 strip    Use to test blood sugars 2 times daily or as directed    Type 2 diabetes mellitus with peripheral neuropathy (H)       cyanocobalamin 1000 MCG tablet    vitamin  B-12    30 tablet    Take 1 tablet by mouth daily.    CKD (chronic kidney disease) stage 5, GFR less than 15 ml/min (H)       cycloSPORINE modified 25 MG capsule    GENERIC EQUIVALENT    240 capsule    Take 4 capsules (100 mg) by mouth 2 times daily    Kidney replaced by transplant       econazole nitrate 1 % cream     85 g    Apply topically daily    DM neuro manif type II, Dermatophytosis of foot       furosemide 20 MG tablet    LASIX    90 tablet    Take 1 tablet (20 mg) by mouth daily    Generalized edema       latanoprost 0.005 % ophthalmic solution    XALATAN    2.5 mL    Place 1 drop into both eyes At Bedtime    Mild stage glaucoma       metFORMIN 500 MG 24 hr tablet    GLUCOPHAGE-XR    90 tablet    Take 3 tablets (1,500 mg) by mouth daily (with dinner)    Type 2 diabetes mellitus with diabetic polyneuropathy, without long-term current use of insulin (H)       mycophenolate 250 MG capsule    GENERIC EQUIVALENT    240 capsule    Take 4 capsules (1,000 mg) by mouth 2 times daily    Kidney transplanted       omeprazole 40 MG capsule    priLOSEC    90 capsule    Take 1 capsule (40 mg) by mouth daily    Gastroesophageal reflux disease without esophagitis       ondansetron 4 MG ODT tab    ZOFRAN-ODT    20 tablet    Take 1 tablet (4 mg) by mouth every 6 hours as needed    Chronic kidney disease, stage V (H)       prazosin 5 MG capsule    MINIPRESS    90 capsule    Take 3 capsules (15 mg) by mouth At Bedtime    Nightmares associated with chronic post-traumatic stress disorder       REFRESH OP      Apply to eye as needed Both eyes        replens Gel     35 g    Use vaginally as needed.  Can use up to 3 times per week.    Vaginal dryness       simvastatin 20 MG tablet    ZOCOR    90 tablet    Take 1 tablet (20 mg) by mouth At Bedtime    Chronic kidney disease, stage V (H)       sulfamethoxazole-trimethoprim 400-80 MG per tablet    BACTRIM/SEPTRA    90 tablet    Take 1 tablet by mouth daily    Immunosuppression (H)       topiramate 200 MG tablet    TOPAMAX    60 tablet    Take 1 tablet (200 mg) by mouth 2 times daily    Morbid obesity due to excess calories (H)       TYLENOL 325 MG tablet   Generic drug:  acetaminophen      Take 325-650 mg by mouth as needed        vilazodone 40 MG Tabs tablet    VIIBRYD    30 tablet    Take 1 tablet (40 mg) by mouth daily    Major depressive disorder, recurrent episode, moderate (H)       vitamin D 1000 UNITS capsule     30 capsule    2,000 Units        * Notice:  This list has 2 medication(s) that are the same as other medications prescribed for you. Read the directions carefully, and ask your doctor or other care provider to review them with you.

## 2017-08-14 NOTE — TELEPHONE ENCOUNTER
Clinic Action Needed: Please call back Patient to advise or if any questions.   Reason for Call:Fell 8/12/17 - Patient  declines triage - requesting a walker order called into [facility] .  Asking if [facility] PCP Dr. Sheridan  Would order a  Walker and send order to  Fax  to [facility] ( phone [phone] ) . Recently fell 8/12/17 at [facility] [employer] ,  generalize bruising  Especially on knees but walking and able to move her  regular activities  but would like walker for extended walking [place]  [employer].   Patient Recommendations/Teaching: Please call back if triage requested or if more symptoms.   Routed to:.Corinne Rdz RN [place] nurse advisors.

## 2017-08-15 ENCOUNTER — OFFICE VISIT (OUTPATIENT)
Dept: PSYCHIATRY | Facility: CLINIC | Age: 62
End: 2017-08-15
Attending: PSYCHOLOGIST
Payer: MEDICARE

## 2017-08-15 DIAGNOSIS — F43.10 POSTTRAUMATIC STRESS DISORDER: ICD-10-CM

## 2017-08-15 DIAGNOSIS — F33.1 MAJOR DEPRESSIVE DISORDER, RECURRENT EPISODE, MODERATE (H): Primary | ICD-10-CM

## 2017-08-15 DIAGNOSIS — F41.1 GENERALIZED ANXIETY DISORDER: ICD-10-CM

## 2017-08-15 NOTE — MR AVS SNAPSHOT
After Visit Summary   8/15/2017    Elizabeth Palma    MRN: [id number]           Patient Information     Date Of Birth          1955        Visit Information        Provider Department      8/15/2017 10:30 AM Era Gonzalez, PhD  Psychiatry Clinic        Today's Diagnoses     Major depressive disorder, recurrent episode, moderate (H)    -  1    Generalized anxiety disorder        Posttraumatic stress disorder           Follow-ups after your visit        Your next 10 appointments already scheduled     Aug 21, 2017 10:00 AM CDT   Return Visit with Javier Tuttle DPM   [facility] ([facility])    [address] [place] [address]   [phone]            Aug 25, 2017  9:00 AM CDT   Lab with UC LAB   [facility] ([facility])    [address] [place] [address]  1st [facility] [id number] [phone]            Aug 25, 2017  9:30 AM CDT   NUTRITION VISIT with Francesca Danielle RD   [facility] ([facility])    [address] [place] [address]  4th Floor  [facility] [address]   [phone]            Aug 28, 2017 10:00 AM CDT   Family Therapy with Era Gonzalez, PhD    Psychiatry Clinic ([facility])    [place] [id number]  [address] [facility] [address]   [phone]            Sep 12, 2017  1:00 PM CDT   Adult Med Follow UP with Chaparrita Hatch MD   [facility]    [address] [facility] [address]  [facility] [address]   [phone]            Sep 15, 2017  9:00 AM CDT   Lab with UC LAB    Health Lab ([facility])    [address] [facility]  1st Floor  [facility] [address] [phone]            Sep 15, 2017  9:30 AM CDT   NUTRITION VISIT with Francesca Danielle RD   [facility] ([facility])    [facility]  [address] [address]   [phone]            Sep 25, 2017  9:00 AM CDT   (Arrive by 8:45 AM)   Return Visit with Horacio Sheridan MD   [facility] ([facility])    [address] [place] [address] [phone]            Oct 13, 2017  9:30 AM CDT   NUTRITION VISIT with Francesca Danielle RD   [facility] Management ([facility])    [address] [place] [address] [phone]            Nov 17, 2017  9:30 AM CST   NUTRITION VISIT with Francesca Danielle RD   [facility] Management ([facility])    [address] [place] [address] [phone]              Who to contact     Please call your clinic at [phone] to:    Ask questions about your health    Make or cancel appointments    Discuss your medicines    Learn about your test results    Speak to your doctor   If you have compliments or concerns about an experience at your clinic, or if you wish to file a complaint, please contact [facility] Patient Relations at [phone] or email us at [email or web address]         Additional Information About Your Visit        MyChart Information     [employer] gives you secure access to your electronic health record. If you see a primary care provider, you can also send messages to your care team and make appointments. If you have questions, please call your primary care clinic.  If you do not have a primary care provider, please call [phone] and they will assist you.      [employer] is an electronic gateway that provides easy, online access to your medical records. With [employer], you can request a clinic appointment, read your test results, renew a prescription or communicate with your care team.     To access your existing account, please contact  your [facility] or call [phone] for assistance.        Care EveryWhere ID     This is your Care EveryWhere ID. This could be used by other organizations to access your [place] medical records  [id number]         Blood Pressure from Last 3 Encounters:   08/17/17 110/75   08/14/17 120/80   08/08/17 130/77    Weight from Last 3 Encounters:   08/17/17 77.8 kg (171 lb 9.6 oz)   08/14/17 76.6 kg (168 lb 14.4 oz)   08/08/17 78 kg (172 lb)              Today, you had the following     No orders found for display         Today's Medication Changes          These changes are accurate as of: 8/15/17 11:59 PM.  If you have any questions, ask your nurse or doctor.               These medicines have changed or have updated prescriptions.        Dose/Directions    cyanocobalamin 1000 MCG tablet   Commonly known as:  vitamin  B-12   This may have changed:  when to take this   Used for:  CKD (chronic kidney disease) stage 5, GFR less than 15 ml/min (H)        Dose:  1000 mcg   Take 1 tablet by mouth daily.   Quantity:  30 tablet   Refills:  0       econazole nitrate 1 % cream   This may have changed:    - when to take this  - reasons to take this   Used for:  DM neuro manif type II, Dermatophytosis of foot        Apply topically daily   Quantity:  85 g   Refills:  3                Primary Care Provider Office Phone # Fax #    Horacio Sheridan [phone]       [address] [place] [address] [address]  [facility] [address]        Equal Access to Services     LINNEA HIDALGO AH: Hadii riky muellero Somartín, waaxda luqadaha, qaybta kaalmada jakobyada, ty fam. So [facility] [phone].    ATENCIÓN: Si habla michelleañol, tiene a goetz disposición servicios gratuitos de asistencia lingüística. Llame al [phone].    We comply with applicable federal civil rights laws and [place] laws. We do not discriminate on the basis of race, color, national origin, age, disability sex,  sexual orientation or gender identity.            Thank you!     Thank you for choosing PSYCHIATRY CLINIC  for your care. Our goal is always to provide you with excellent care. Hearing back from our patients is one way we can continue to improve our services. Please take a few minutes to complete the written survey that you may receive in the mail after your visit with us. Thank you!             Your Updated Medication List - Protect others around you: Learn how to safely use, store and throw away your medicines at www.disposemymeds.org.          This list is accurate as of: 8/15/17 11:59 PM.  Always use your most recent med list.                   Brand Name Dispense Instructions for use Diagnosis    acetylcysteine 600 MG Caps capsule    N-ACETYL CYSTEINE    30 capsule    Take 2 400 mg caps two times daily for total daily dose of 800 mg        ARIPiprazole 2 MG tablet    ABILIFY    15 tablet    Take 0.5 tablets (1 mg) by mouth daily    Major depressive disorder, recurrent episode, moderate (H)       aspirin 81 MG EC tablet     30 tablet    Take 1 tablet (81 mg) by mouth daily    Kidney replaced by transplant       BENADRYL 25 MG capsule   Generic drug:  diphenhydrAMINE      Take 25 mg by mouth At Bedtime.        blood glucose monitoring lancets     1 Box    Use to test blood sugar 2 times daily or as directed.    Type 2 diabetes mellitus with peripheral neuropathy (H)       * blood glucose monitoring meter device kit    no brand specified    1 kit    Use to test blood sugar 2 times daily or as directed.    Type 2 diabetes mellitus with peripheral neuropathy (H)       * blood glucose monitoring meter device kit           blood glucose monitoring test strip    no brand specified    100 strip    Use to test blood sugars 2 times daily or as directed    Type 2 diabetes mellitus with peripheral neuropathy (H)       cyanocobalamin 1000 MCG tablet    vitamin  B-12    30 tablet    Take 1 tablet by mouth daily.    CKD  (chronic kidney disease) stage 5, GFR less than 15 ml/min (H)       cycloSPORINE modified 25 MG capsule    GENERIC EQUIVALENT    240 capsule    Take 4 capsules (100 mg) by mouth 2 times daily    Kidney replaced by transplant       econazole nitrate 1 % cream     85 g    Apply topically daily    DM neuro manif type II, Dermatophytosis of foot       furosemide 20 MG tablet    LASIX    90 tablet    Take 1 tablet (20 mg) by mouth daily    Generalized edema       latanoprost 0.005 % ophthalmic solution    XALATAN    2.5 mL    Place 1 drop into both eyes At Bedtime    Mild stage glaucoma       metFORMIN 500 MG 24 hr tablet    GLUCOPHAGE-XR    90 tablet    Take 3 tablets (1,500 mg) by mouth daily (with dinner)    Type 2 diabetes mellitus with diabetic polyneuropathy, without long-term current use of insulin (H)       mycophenolate 250 MG capsule    GENERIC EQUIVALENT    240 capsule    Take 4 capsules (1,000 mg) by mouth 2 times daily    Kidney transplanted       omeprazole 40 MG capsule    priLOSEC    90 capsule    Take 1 capsule (40 mg) by mouth daily    Gastroesophageal reflux disease without esophagitis       ondansetron 4 MG ODT tab    ZOFRAN-ODT    20 tablet    Take 1 tablet (4 mg) by mouth every 6 hours as needed    Chronic kidney disease, stage V (H)       prazosin 5 MG capsule    MINIPRESS    90 capsule    Take 3 capsules (15 mg) by mouth At Bedtime    Nightmares associated with chronic post-traumatic stress disorder       REFRESH OP      Apply to eye as needed Both eyes        replens Gel     35 g    Use vaginally as needed. Can use up to 3 times per week.    Vaginal dryness       simvastatin 20 MG tablet    ZOCOR    90 tablet    Take 1 tablet (20 mg) by mouth At Bedtime    Chronic kidney disease, stage V (H)       sulfamethoxazole-trimethoprim 400-80 MG per tablet    BACTRIM/SEPTRA    90 tablet    Take 1 tablet by mouth daily    Immunosuppression (H)       topiramate 200 MG tablet    TOPAMAX    60 tablet    Take  1 tablet (200 mg) by mouth 2 times daily    Morbid obesity due to excess calories (H)       TYLENOL 325 MG tablet   Generic drug:  acetaminophen      Take 325-650 mg by mouth as needed        vilazodone 40 MG Tabs tablet    VIIBRYD    30 tablet    Take 1 tablet (40 mg) by mouth daily    Major depressive disorder, recurrent episode, moderate (H)       vitamin D 1000 UNITS capsule     30 capsule    2,000 Units        * Notice:  This list has 2 medication(s) that are the same as other medications prescribed for you. Read the directions carefully, and ask your doctor or other care provider to review them with you.

## 2017-08-16 NOTE — TELEPHONE ENCOUNTER
Discussed with patient that an appointment will be needed to further evaluate for walker.  Patient verbalizes understanding and scheduled with first available resident.  Francesca Slaughter LPN

## 2017-08-16 NOTE — PROGRESS NOTES
"Group Therapy N = 6 present; 70 min  Diagnoses: MDD (F33.1); NELDA(F41,1) PTSD (F43.10)  Co-Facilitators: Murphy Gonzalez, Steph Marquis, and Yosef Pacheco      S/O:  Reviewed Homework and Left Over from last week and set new homework.       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 Elizabeth described how her original co- with her has been talking bad about Elizabeth's family over the past couple of years and most recently even said to Elizabeth that her \"grandson will never be normal\" which resulted in Elizabeth not inviting this woman to her home for the last [employer] group.  Pt appeared to have slight insight toward the end of discussion that she in fact may be the one who is excluding her self from group by highlighting an ongoing conflict with the [employer] group peer.  She was also open to further understanding of why comments made by that person were so hurtful in the context of her family of origin (i.e. Brother with DD)..     2. Boundaries with Children  When a peer shared some information about how she socialized with a younger person who is also a friend to peer's daughter - prompting a discussion on boundaries between parents and adult children and Elizabeth was attentive to this discussion.        3.Susan Johnson described having a fall (had visible injuries to face) while palying mini -golf.  She offered this example, which served to promote further universality about helath related concerns that several peers shared during the session.    Assessment: Elizabeth arrived on time to the group. She seemed to listened to the feedback and suggestions of the group but demonstrated .limited insight into her role in half of the [employer] group wanting her out of the group possibly b/c they do not want the tension and the formal or informal rule is that \"no one is excluded " "from their weekly [employer] group?\" Elizabeth seemed to remain black/white in looking at this predicament and did not want to explore ways of negotiating her place in the [employer] group The Friday [employer] group only includes 2 of her original [employer] group. A question may be raised to the level of negative talking Elizabeth has outside of her marriage---and we have  in the group as she complains about her \" is mean to me\" when he is actually trying to make her a bagel or do something \"different\" but she sees as wrong.    Plan: Continue weekly group therapy to work on goals. See treatment plan.         Steph rIwin MD   Psychiatry Resident PGY3    I was present for and actively participated in the entire group therapy session, my observations of Elizabeth Palma s progress and participation are that Elizabeth is continuing to struggle with interpersonal relationships both with her  and 2 social groups she has belongs with recently the major impact of being asked to leave a social group she has been a part of for almost 30 years.    Era Gonzalez, PhD, LP  "

## 2017-08-17 ENCOUNTER — OFFICE VISIT (OUTPATIENT)
Dept: INTERNAL MEDICINE | Facility: CLINIC | Age: 62
End: 2017-08-17

## 2017-08-17 VITALS
WEIGHT: 171.6 LBS | DIASTOLIC BLOOD PRESSURE: 75 MMHG | SYSTOLIC BLOOD PRESSURE: 110 MMHG | HEART RATE: 66 BPM | BODY MASS INDEX: 33.51 KG/M2

## 2017-08-17 DIAGNOSIS — E66.01 MORBID OBESITY DUE TO EXCESS CALORIES (H): ICD-10-CM

## 2017-08-17 DIAGNOSIS — J45.990 EXERCISE-INDUCED ASTHMA: Primary | ICD-10-CM
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
## 2017-08-17 DIAGNOSIS — R42 LIGHTHEADEDNESS: ICD-10-CM

## 2017-08-17 RX ORDER — ALBUTEROL SULFATE 90 UG/1
2 AEROSOL, METERED RESPIRATORY (INHALATION) EVERY 6 HOURS PRN
Qty: 1 INHALER | Refills: 11 | Status: SHIPPED | OUTPATIENT
Start: 2017-08-17 | End: 2018-09-07

## 2017-08-17 ASSESSMENT — PAIN SCALES - GENERAL: PAINLEVEL: MILD PAIN (3)

## 2017-08-17 NOTE — NURSING NOTE
Chief Complaint   Patient presents with     Fall     Patient here after a fall on 8/12. Patient states she would like walker with a seat.       Gaye Ovalle CMA at 2:14 PM on 8/17/2017.

## 2017-08-17 NOTE — PROGRESS NOTES
PRIMARY CARE CENTER       SUBJECTIVE:  Elizabeth Palma is a 61 year old female with a history of ADPKD, kidney transplant in 3/2014, DM2, secondary hyperparathyroidism, HLD, HTN, MDD, NELDA, and PTSD who presents to request for a walker after a fall.    Patient follows with Dr. Sheridan. Last saw Dr. Sheridan in 4/2017.     Notes having a fall while playing mini golf 5 days ago and tripped over the ground since it was uneven. Fell onto her knees, then landed on chest and front of face but does not think that she hit her forehead against the ground. Before 5 days ago, her last fall was 2 years ago. No LOC, CP or SOB before the fall. Does not think that she broke any bones but has a history of multiple bone fractures. Has bruise on face, left collar bone and knees. Notes having chronic lightheadedness for years ago standing for a long period and walking a block. Was previously told by another provider that it may be due to use of Topamax which she is taking for weight loss and does not want to stop at this time.    ROS positive for only occasional has nausea and emesis (NBNB) 1 week ago from immunosuppression mediations but notes that this has improved a lot.    Medications and allergies reviewed by me today.     ROS:   Constitutional, HEENT, cardiovascular, pulmonary, gi and gu systems are negative, except as otherwise noted.      OBJECTIVE:   /75  Pulse 66  Wt 77.8 kg (171 lb 9.6 oz)  Breastfeeding? No  BMI 33.51 kg/m2   Wt Readings from Last 1 Encounters:   08/17/17 77.8 kg (171 lb 9.6 oz)     Gen: Alert, oriented, pleasant, NAD  HEENT: NC/AT, PERRL, EOMI, moist mucous membranes, oropharynx w/o erythema, exudate, or lesions. Superficial erosion above upper lip and below left nostril.  Neck: No cervical lymphadenopathy  CV: RRR, normal S1 and S2, no murmurs, 2+ bilateral radial pulses  Respiratory: CTAB, good air movement bilaterally, no wheezes, rales, or rhonchi  Abdomen: Soft, NTND. BS  present. No palpable masses. Midline surgical scar  Extremities: Trace nonpitting BLE edema around ankles  Neuro: CN II-XII grossly intact, moves all extremities  Skin: Ecchymosis over left clavicle with TTP over left clavicle but no obvious stepoff, good ROM of left shoulder without pain over left clavicle. Ecchymoses of bilateral patellae     ASSESSMENT/PLAN:    Elizabeth Palma is a 61 year old female with a history of ADPKD, kidney transplant in 3/2014, DM2, secondary hyperparathyroidism, HLD, HTN, MDD, NELDA, and PTSD who presents to request for a walker after a fall.    Fall, initial encounter  Had mechanical fall 5 days ago, last fall prior to this was 2 years.  Offered PT which patient was not interested in at this time since patient has previously undergone PT. Considered left clavicle x-ray but suspicion for fracture is low since patient has good left shoulder ROM and patient declines x-ray at this time.  -     order for DME; Walker with front wheels and a seat.  - Advised patient that if she has worsening bone pain (cervical, leg, etc.), RTC to imaging. Patient will f/u with Dr. Sheridan on 9/25.    Lightheadedness  Has chronic lightheadedness for years after walking 1 block. Previously thought to be due to Topamax use.   - Follow up with Dr. Sheridan for further evaluation    Exercise-induced asthma  -     Refilled albuterol (PROAIR HFA/PROVENTIL HFA/VENTOLIN HFA) 108 (90 BASE) MCG/ACT Inhaler; Inhale 2 puffs into the lungs every 6 hours as needed for shortness of breath / dyspnea or wheezing per patient's request      Health maintenance:   FIT test 4/2017 negative. HPV and Pap negative in 2013 - due for next Pap in 2018    Patient should return to clinic for follow-up with me PRN.  Patient will follow-up with Dr. Sheridan on 9/25.    Patient was seen and plan of care discussed with Dr. Tate.     Melania Edward MD, PhD  PGY-2 Internal Medicine  Pager: [phone]    Aug 17, 2017      Pt was seen and examined with   Wagner.  I agree with her documentation as noted above.    My additional comments: None    Susan Tate

## 2017-08-17 NOTE — MR AVS SNAPSHOT
After Visit Summary   8/17/2017    Elizabeth Palma    MRN: [id number]           Patient Information     Date Of Birth          1955        Visit Information        Provider Department      8/17/2017 1:55 PM Melania Edward MD [facility]        Today's Diagnoses     Exercise-induced asthma    -  1    Fall, initial encounter          Care Instructions    Primary Care Center Medication Refill Request Information:  * Please contact your pharmacy regarding ANY request for medication refills.  ** [facility] Prescription Fax = [phone]  * Please allow 3 business days for routine medication refills.  * Please allow 5 business days for controlled substance medication refills.     Primary Care Center Test Result notification information:  *You will be notified with in 7-10 days of your appointment day regarding the results of your test.  If you are on MyChart you will be notified as soon as the provider has reviewed the results and signed off on them.    Primary Care Center [phone]             Follow-ups after your visit        Follow-up notes from your care team     Return if symptoms worsen or fail to improve.      Your next 10 appointments already scheduled     Aug 21, 2017 10:00 AM CDT   [facility] Visit with Javier Tuttle DPM   [facility]    [address] [place] [address] [phone]            Aug 25, 2017  9:00 AM CDT   Lab with  LAB   [facility] Lab ([facility])    [address] [place] [address] [phone]            Aug 25, 2017  9:30 AM CDT   NUTRITION VISIT with Francesca Danielle RD   [facility] Surgical [facility] ([facility])    [address] [place] [address]  4th [facility] [address] [phone]            Aug 28, 2017 10:00 AM CDT   Family Therapy with Era Gonzalez, PhD    Psychiatry Clinic ([facility]  [facility])    [address]  [facility] [address]   [phone]            Sep 12, 2017  1:00 PM CDT   Adult Med Follow UP with Chaparrita Hatch MD   [facility] ([facility])    [address]  [facility] [address]   [phone]            Sep 15, 2017  9:00 AM CDT   Lab with  LAB   [facility] ([facility])    [address] [place] [address]   [phone]            Sep 15, 2017  9:30 AM CDT   NUTRITION VISIT with Francesca Danielle RD   [facility] ([facility])    [address] [place] [id number] [phone]            Sep 25, 2017  9:00 AM CDT   (Arrive by 8:45 AM)   Return Visit with Horacio Sheridan MD   [facility] Primary Care Clinic ([facility])    [address] [place] [id number] [phone]            Oct 13, 2017  9:30 AM CDT   NUTRITION VISIT with Francesca Danielle RD   [facility] ([facility])    [address] [place] [id number] [phone]            Nov 17, 2017  9:30 AM CST   NUTRITION VISIT with Francesca Danielle RD   [facility] ([facility])    [address] [place] [id number] [phone]              Who to contact     Please call your clinic at [phone] to:    Ask questions about your health    Make or cancel appointments    Discuss your medicines    Learn about your test results    Speak to your doctor   If you have compliments or concerns about an experience at your clinic, or if you wish to file a complaint, please contact [facility] Patient Relations at [phone] or email us at [email or web address]          Additional Information About Your Visit        [employer] Information     [employer] gives you secure access to your electronic health record. If you see a primary care provider, you can also send messages to your care team and make appointments. If you have questions, please call your primary care clinic.  If you do not have a primary care provider, please call [phone] and they will assist you.      [employer] is an electronic gateway that provides easy, online access to your medical records. With [employer], you can request a clinic appointment, read your test results, renew a prescription or communicate with your care team.     To access your existing account, please contact your [facility] or call [phone] for assistance.        Care EveryWhere ID     This is your Care EveryWhere ID. This could be used by other organizations to access your [facility] medical records  [id number]        Your Vitals Were     Pulse Breastfeeding? BMI (Body Mass Index)             66 No 33.51 kg/m2          Blood Pressure from Last 3 Encounters:   08/17/17 110/75   08/14/17 120/80   08/08/17 130/77    Weight from Last 3 Encounters:   08/17/17 77.8 kg (171 lb 9.6 oz)   08/14/17 76.6 kg (168 lb 14.4 oz)   08/08/17 78 kg (172 lb)              Today, you had the following     No orders found for display         Today's Medication Changes          These changes are accurate as of: 8/17/17  2:55 PM.  If you have any questions, ask your nurse or doctor.               Start taking these medicines.        Dose/Directions    order for DME   Used for:  Fall, initial encounter   Started by:  Melania Edward MD        Walker with front wheels and a seat.   Quantity:  1 Units   Refills:  0         These medicines have changed or have updated prescriptions.        Dose/Directions    cyanocobalamin 1000 MCG tablet   Commonly known as:  vitamin  B-12   This may have changed:  when to take this   Used for:  CKD  (chronic kidney disease) stage 5, GFR less than 15 ml/min (H)        Dose:  1000 mcg   Take 1 tablet by mouth daily.   Quantity:  30 tablet   Refills:  0       econazole nitrate 1 % cream   This may have changed:    - when to take this  - reasons to take this   Used for:  DM neuro manif type II, Dermatophytosis of foot        Apply topically daily   Quantity:  85 g   Refills:  3            Where to get your medicines      These medications were sent to [facility] - [place], [place] - [address] [facility] [address]  [address], hospitals [address]    Hours:  24-hours Phone:  [phone]     albuterol 108 (90 BASE) MCG/ACT Inhaler         Some of these will need a paper prescription and others can be bought over the counter.  Ask your nurse if you have questions.     Bring a paper prescription for each of these medications     order for DME                Primary Care Provider Office Phone # Fax #    Horacio Sheridan [phone]       [address] [place] [address] [address]  [facility] [address]        Equal Access to Services     [facility] : Hadii riky ku hadasho Somartín, waaxda luqadaha, qaybta kaalmada adeegyajuly, ty agrawal . So [facility] [phone].    ATENCIÓN: Si habla español, tiene a goetz disposición servicios gratuitos de asistencia lingüística. Llame al [phone].    We comply with applicable federal civil rights laws and [place] laws. We do not discriminate on the basis of race, color, national origin, age, disability sex, sexual orientation or gender identity.            Thank you!     Thank you for choosing [facility]  for your care. Our goal is always to provide you with excellent care. Hearing back from our patients is one way we can continue to improve our services. Please take a few minutes to complete the written survey that you may receive in the mail after your visit with us. Thank you!             Your Updated Medication  List - Protect others around you: Learn how to safely use, store and throw away your medicines at www.disposemymeds.org.          This list is accurate as of: 8/17/17  2:55 PM.  Always use your most recent med list.                   Brand Name Dispense Instructions for use Diagnosis    acetylcysteine 600 MG Caps capsule    N-ACETYL CYSTEINE    30 capsule    Take 2 400 mg caps two times daily for total daily dose of 800 mg        albuterol 108 (90 BASE) MCG/ACT Inhaler    PROAIR HFA/PROVENTIL HFA/VENTOLIN HFA    1 Inhaler    Inhale 2 puffs into the lungs every 6 hours as needed for shortness of breath / dyspnea or wheezing    Exercise-induced asthma       ARIPiprazole 2 MG tablet    ABILIFY    15 tablet    Take 0.5 tablets (1 mg) by mouth daily    Major depressive disorder, recurrent episode, moderate (H)       aspirin 81 MG EC tablet     30 tablet    Take 1 tablet (81 mg) by mouth daily    Kidney replaced by transplant       BENADRYL 25 MG capsule   Generic drug:  diphenhydrAMINE      Take 25 mg by mouth At Bedtime.        blood glucose monitoring lancets     1 Box    Use to test blood sugar 2 times daily or as directed.    Type 2 diabetes mellitus with peripheral neuropathy (H)       * blood glucose monitoring meter device kit    no brand specified    1 kit    Use to test blood sugar 2 times daily or as directed.    Type 2 diabetes mellitus with peripheral neuropathy (H)       * blood glucose monitoring meter device kit           blood glucose monitoring test strip    no brand specified    100 strip    Use to test blood sugars 2 times daily or as directed    Type 2 diabetes mellitus with peripheral neuropathy (H)       cyanocobalamin 1000 MCG tablet    vitamin  B-12    30 tablet    Take 1 tablet by mouth daily.    CKD (chronic kidney disease) stage 5, GFR less than 15 ml/min (H)       cycloSPORINE modified 25 MG capsule    GENERIC EQUIVALENT    240 capsule    Take 4 capsules (100 mg) by mouth 2 times daily     Kidney replaced by transplant       econazole nitrate 1 % cream     85 g    Apply topically daily    DM neuro manif type II, Dermatophytosis of foot       furosemide 20 MG tablet    LASIX    90 tablet    Take 1 tablet (20 mg) by mouth daily    Generalized edema       latanoprost 0.005 % ophthalmic solution    XALATAN    2.5 mL    Place 1 drop into both eyes At Bedtime    Mild stage glaucoma       metFORMIN 500 MG 24 hr tablet    GLUCOPHAGE-XR    90 tablet    Take 3 tablets (1,500 mg) by mouth daily (with dinner)    Type 2 diabetes mellitus with diabetic polyneuropathy, without long-term current use of insulin (H)       mycophenolate 250 MG capsule    GENERIC EQUIVALENT    240 capsule    Take 4 capsules (1,000 mg) by mouth 2 times daily    Kidney transplanted       omeprazole 40 MG capsule    priLOSEC    90 capsule    Take 1 capsule (40 mg) by mouth daily    Gastroesophageal reflux disease without esophagitis       ondansetron 4 MG ODT tab    ZOFRAN-ODT    20 tablet    Take 1 tablet (4 mg) by mouth every 6 hours as needed    Chronic kidney disease, stage V (H)       order for DME     1 Units    Walker with front wheels and a seat.    Fall, initial encounter       prazosin 5 MG capsule    MINIPRESS    90 capsule    Take 3 capsules (15 mg) by mouth At Bedtime    Nightmares associated with chronic post-traumatic stress disorder       REFRESH OP      Apply to eye as needed Both eyes        replens Gel     35 g    Use vaginally as needed. Can use up to 3 times per week.    Vaginal dryness       simvastatin 20 MG tablet    ZOCOR    90 tablet    Take 1 tablet (20 mg) by mouth At Bedtime    Chronic kidney disease, stage V (H)       sulfamethoxazole-trimethoprim 400-80 MG per tablet    BACTRIM/SEPTRA    90 tablet    Take 1 tablet by mouth daily    Immunosuppression (H)       topiramate 200 MG tablet    TOPAMAX    60 tablet    Take 1 tablet (200 mg) by mouth 2 times daily    Morbid obesity due to excess calories (H)        TYLENOL 325 MG tablet   Generic drug:  acetaminophen      Take 325-650 mg by mouth as needed        vilazodone 40 MG Tabs tablet    VIIBRYD    30 tablet    Take 1 tablet (40 mg) by mouth daily    Major depressive disorder, recurrent episode, moderate (H)       vitamin D 1000 UNITS capsule     30 capsule    2,000 Units        * Notice:  This list has 2 medication(s) that are the same as other medications prescribed for you. Read the directions carefully, and ask your doctor or other care provider to review them with you.

## 2017-08-17 NOTE — PATIENT INSTRUCTIONS
[facility] Medication Refill Request Information:  * Please contact your pharmacy regarding ANY request for medication refills.  ** [facility]ax = [phone]  * Please allow 3 business days for routine medication refills.  * Please allow 5 business days for controlled substance medication refills.     [facility] Test Result notification information:  *You will be notified with in 7-10 days of your appointment day regarding the results of your test.  If you are on MyChart you will be notified as soon as the provider has reviewed the results and signed off on them.    [facility] [phone]

## 2017-08-21 ENCOUNTER — OFFICE VISIT (OUTPATIENT)
Dept: PODIATRY | Facility: CLINIC | Age: 62
End: 2017-08-21
Payer: COMMERCIAL

## 2017-08-21 VITALS — OXYGEN SATURATION: 99 % | DIASTOLIC BLOOD PRESSURE: 70 MMHG | SYSTOLIC BLOOD PRESSURE: 102 MMHG | HEART RATE: 61 BPM

## 2017-08-21 DIAGNOSIS — E11.40 DIABETIC NEUROPATHY WITH NEUROLOGIC COMPLICATION (H): ICD-10-CM

## 2017-08-21 DIAGNOSIS — L60.2 ONYCHAUXIS: Primary | ICD-10-CM

## 2017-08-21 DIAGNOSIS — E11.49 DIABETIC NEUROPATHY WITH NEUROLOGIC COMPLICATION (H): ICD-10-CM

## 2017-08-21 PROCEDURE — 99213 OFFICE O/P EST LOW 20 MIN: CPT | Performed by: PODIATRIST

## 2017-08-21 RX ORDER — TOPIRAMATE 200 MG/1
200 TABLET, FILM COATED ORAL 2 TIMES DAILY
Qty: 60 TABLET | Refills: 2 | Status: SHIPPED | OUTPATIENT
Start: 2017-08-21 | End: 2017-11-20

## 2017-08-21 NOTE — PROGRESS NOTES
Past Medical History:   Diagnosis Date     Abnormal MRI, cervical spine 10/15/2011    2011; mild changes noted. Study done for left arm symptoms Impression:  1. Mild multilevel degenerative disc disease with no significant canal or neural stenosis seen. motion artifact on the STIR images in these are not interpretable. The remaining images were interpreted      Autosomal dominant polycystic kidney disease 2011     (Problem list name updated by automated process. Provider to review and confirm.)     CMC DJD(carpometacarpal degenerative joint disease), localized primary 3/5/2013     -donor kidney transplant 3/20/2014     DM type 2 (diabetes mellitus, type 2) (H) 2013     Encounter for long-term (current) use of other medications 2015     Family history of tremor 10/17/2011     Generalized anxiety disorder 11/15/2012     Glaucoma      Hyperlipidemia 10/15/2011     Hyperparathyroidism, secondary (H) 2015     Hypertension     resolved     Immunosuppressed status (H) 3/20/2014     Major depressive disorder, recurrent episode, moderate (H) 11/15/2012     Obesity (BMI 30-39.9)      OP (osteoporosis) T score -3.8 2009    2007 T-score -3.7      LION (obstructive sleep apnoea) 10/15/2012    intol to cpa     Pain in joint, forearm -- L unhealed Fx 2013     Premature menopause age 37 7/10/2012    OCP (vaginal bldg)-->HT which she stopped 2 mo later documented at 2007 visit (age 51).      Restless leg syndrome      Rib fractures 2013     Sensory loss 10/17/2011    Bottom of feet; uncertain if there is a neuropathy per notes.       Stiffness of joint, not elsewhere classified, hand 3/5/2013     Tremor 10/15/2011    head     Patient Active Problem List   Diagnosis     Autosomal dominant polycystic kidney disease     Hypertension     Hyperlipidemia     Abnormal MRI, cervical spine     Sensory loss     Premature menopause age 37     OP (osteoporosis) T score -3.8     LION on CPAP      Major depressive disorder, recurrent episode, moderate (H)     Generalized anxiety disorder     CMC DJD(carpometacarpal degenerative joint disease), localized primary     Pain in joint, forearm -- L unhealed Fx     -donor kidney transplant     Immunosuppressed status (H)     Hyperparathyroidism, secondary (H)     Hyperparathyroidism (H)     Senile osteoporosis     Pain in joint involving ankle and foot     Nightmares associated with chronic post-traumatic stress disorder     Type 2 diabetes mellitus with peripheral neuropathy (H)     Posttraumatic stress disorder     Right knee pain     Left knee pain     Age-related osteoporosis without current pathological fracture     Past Surgical History:   Procedure Laterality Date     ANKLE SURGERY       C TRANSPLANTATION OF KIDNEY  3/2014     C/SECTION, LOW TRANSVERSE      x 2     CHOLECYSTECTOMY       ESOPHAGOSCOPY, GASTROSCOPY, DUODENOSCOPY (EGD), COMBINED N/A 2015    Procedure: COMBINED ESOPHAGOSCOPY, GASTROSCOPY, DUODENOSCOPY (EGD);  Surgeon: Sky Davey MD;  Location:  GI     ESOPHAGOSCOPY, GASTROSCOPY, DUODENOSCOPY (EGD), COMBINED N/A 2015    Procedure: COMBINED ESOPHAGOSCOPY, GASTROSCOPY, DUODENOSCOPY (EGD), BIOPSY SINGLE OR MULTIPLE;  Surgeon: Sky Davey MD;  [facility]: [facility]     LAPAROSCOPY, SURGICAL; REPAIR INCISIONAL OR VENTRAL HERNIA       HERMINIA EN Y BOWEL       WRIST SURGERY       Social History     Social History     Marital status:      Spouse name: Rahat     Number of children: 2     Years of education: N/A     Occupational History           part-time     Social History Main Topics     Smoking status: Former Smoker     Smokeless tobacco: Never Used     Alcohol use No     Drug use: No     Sexual activity: Yes     Partners: Male     Birth control/ protection: Post-menopausal      Comment: 1 partner     Other Topics Concern     Not on file     Social History Narrative     Family History   Problem Relation Age  of Onset     MENTAL ILLNESS Other      family hx     HEART DISEASE Other      DIABETES Other      CANCER Other      Glaucoma No family hx of      Macular Degeneration No family hx of      Lab Results   Component Value Date    A1C 6.5 05/19/2017      SUBJECTIVE FINDINGS:  A 61-year-old female returns to clinic for onychauxis and diabetic foot check.  She is diabetic with peripheral neuropathy.  Relates she has numbness and tingling and neuropathy in her feet that is better since starting weight loss medication and she has been able to stop the Gabapentin.  No ulcers or sores since I have seen her last.  She relates she is wearing her diabetic shoes and no new problems.       OBJECTIVE FINDINGS:  DP and PT are 2/4 bilaterally.  CFTs are less than 3 seconds bilaterally.  She has incurvated nails with some dystrophy and subungual debris bilaterally 1-5.  She has dorsally contracted digits 2-5 bilaterally.  She has decreased ankle joint dorsiflexion bilaterally.  There is no erythema, no drainage, no odor, no calor bilaterally.  There is some dystrophy and dryness of the nails as well.  Sharp/dull is decreased on left 5th mpj otherwise intact with 5.07 Higginson-Yaneth monofilament.  She does have a bunion deformity present.        ASSESSMENT AND PLAN:  Onychauxis bilaterally.  She is diabetic with peripheral neuropathy.  Diagnosis and treatment options were discussed with the patient.  All of the nails were reduced bilaterally upon consent.  She was advised on stretching, continue the diabetic shoes and return to clinic and see me in about 3 months.

## 2017-08-21 NOTE — NURSING NOTE
Elizabeth Palma's goals for this visit include: Diabetic foot check   She requests these members of her care team be copied on today's visit information: yes    PCP: Horacio Sheridan    Referring Provider:  ESTABLISHED PATIENT  No address on file    Chief Complaint   Patient presents with     RECHECK     Diabetic foot check        Initial Pulse 61  SpO2 99% Estimated body mass index is 33.51 kg/(m^2) as calculated from the following:    Height as of 8/14/17: 1.524 m (5').    Weight as of 8/17/17: 77.8 kg (171 lb 9.6 oz).  Medication Reconciliation: complete

## 2017-08-21 NOTE — TELEPHONE ENCOUNTER
topiramate        Last Written Prescription Date:  2/17/17  Last Fill Quantity: 60,   # refills: 5  Last Office Visit : 8/14/17  Future Office visit:  11/20/17

## 2017-08-21 NOTE — PATIENT INSTRUCTIONS
Thanks for coming today.  Ortho/Sports Medicine Clinic  [address] [place] [address]    To schedule future appointments in Ortho Clinic, you may call [phone].    To schedule ordered imaging by your Provider: Call [facility] at [phone]    The [employer] available online at:   [email or web address]    Please call if any further questions or concerns [phone] and ask for the Orthopedic Department. Clinic hours 8 am to 5 pm.    Return to clinic if symptoms worsen.

## 2017-08-21 NOTE — MR AVS SNAPSHOT
After Visit Summary   8/21/2017    Elizabeth Palma    MRN: [id number]           Patient Information     Date Of Birth          1955        Visit Information        Provider Department      8/21/2017 10:00 AM Javier Tuttle DPM [facility]        Today's Diagnoses     Onychauxis    -  1    Diabetic neuropathy with neurologic complication (H)          Care Instructions    Thanks for coming today.  Ortho[facility] Medicine Clinic  [address] [place] [address]    To schedule future appointments in Ortho Clinic, you may call [phone].    To schedule ordered imaging by your Provider: Call [facility] at [phone]    [employer] available online at:   [email or web address]    Please call if any further questions or concerns [phone] and ask for the Orthopedic Department. Clinic hours 8 am to 5 pm.    Return to clinic if symptoms worsen.            Follow-ups after your visit        Your next 10 appointments already scheduled     Aug 25, 2017  9:00 AM CDT   Lab with UC LAB   [facility] ([facility])    [address]  [address] [facility] [id number] [phone]            Aug 25, 2017  9:30 AM CDT   NUTRITION VISIT with Francesca Danielle RD   [facility] ([facility])    [address] [place] [address]  [address] [facility] [id number] [phone]            Aug 28, 2017 10:00 AM CDT   Family Therapy with Era Gonzalez, PhD    Psychiatry Clinic ([facility])    [address]  [address] [facility] [id number] [phone]            Sep 12, 2017  1:00 PM CDT   Adult Med Follow UP with Chaparrita Hatch MD   [facility] Psychiatry ([facility])    [address] [facility] [address]  [facility] [id number] [phone]            Sep 15, 2017  9:00 AM CDT   Lab with UC LAB    Health Lab ([facility]  [facility])    [address] [place] [address]   [phone]            Sep 15, 2017  9:30 AM CDT   NUTRITION VISIT with Francesca Danielle RD   [facility] Surgical Weight Management ([facility])    [address] [place] [address]   [phone]            Sep 25, 2017  9:00 AM CDT   (Arrive by 8:45 AM)   Return Visit with Horacio Sheridan MD   [facility] Primary Care Clinic ([facility])    [address] [place] [address]   [phone]            Oct 13, 2017  9:30 AM CDT   NUTRITION VISIT with Francesca Danielle RD   [facility] Surgical [facility] Management ([facility])    [address] [place] [address]   [phone]            Nov 17, 2017  9:30 AM CST   NUTRITION VISIT with Francesca Danielle RD   [facility] Surgical [facility] ([facility])    [address] [place] [address]   [phone]            Nov 20, 2017 10:45 AM CST   (Arrive by 10:30 AM)   Return Visit with Prakash Irene MD   [facility] Medical Weight Management ([facility])    [address] [place] [address]   [phone]              Who to contact     If you have questions or need follow up information about today's clinic visit or your schedule please contact [facility] directly at [phone].  Normal or non-critical lab and imaging results will be communicated to you by MyChart, letter or phone within 4 business days after the clinic has received the results. If you do not hear from us within 7 days, please contact the clinic through MyChart or phone. If you have a critical or abnormal lab result, we will notify you by phone as soon as possible.  Submit refill requests through [employer] or call your pharmacy and they  will forward the refill request to us. Please allow 3 business days for your refill to be completed.          Additional Information About Your Visit        [employer] Information     [employer] Life gives you secure access to your electronic health record. If you see a primary care provider, you can also send messages to your care team and make appointments. If you have questions, please call your primary care clinic.  If you do not have a primary care provider, please call [phone] and they will assist you.      Story of My Life is an electronic gateway that provides easy, online access to your medical records. With [employer], you can request a clinic appointment, read your test results, renew a prescription or communicate with your care team.     To access your existing account, please contact your [facility] or call [phone] for assistance.        Care EveryWhere ID     This is your Care EveryWhere ID. This could be used by other organizations to access your [facility] medical records  [id number]        Your Vitals Were     Pulse Pulse Oximetry                61 99%           Blood Pressure from Last 3 Encounters:   08/21/17 102/70   08/17/17 110/75   08/14/17 120/80    Weight from Last 3 Encounters:   08/17/17 77.8 kg (171 lb 9.6 oz)   08/14/17 76.6 kg (168 lb 14.4 oz)   08/08/17 78 kg (172 lb)              We Performed the Following     TRIM NONDYSTRPHIC NAIL(S)          Today's Medication Changes          These changes are accurate as of: 8/21/17 10:14 AM.  If you have any questions, ask your nurse or doctor.               These medicines have changed or have updated prescriptions.        Dose/Directions    cyanocobalamin 1000 MCG tablet   Commonly known as:  vitamin  B-12   This may have changed:  when to take this   Used for:  CKD (chronic kidney disease) stage 5, GFR less than 15 ml/min (H)        Dose:  1000 mcg   Take 1 tablet by mouth daily.   Quantity:  30 tablet   Refills:  0        econazole nitrate 1 % cream   This may have changed:    - when to take this  - reasons to take this   Used for:  DM neuro manif type II, Dermatophytosis of foot        Apply topically daily   Quantity:  85 g   Refills:  3                Primary Care Provider Office Phone # Fax #    Horacio Sheridan [phone]       [address] [place] [address] [address]  [facility] [address]        Equal Access to Services     LINNEA HIDALGO : Hadii aad ku hadasho Soomaali, waaxda luqadaha, qaybta kaalmada adeegyada, waxay idiin hayaan adeeg khtannersh laharjitn ah. So [facility] [phone].    ATENCIÓN: Si ashok price, tiene a goetz disposición servicios gratuitos de asistencia lingüística. Llame al [phone].    We comply with applicable federal civil rights laws and [place] laws. We do not discriminate on the basis of race, color, national origin, age, disability sex, sexual orientation or gender identity.            Thank you!     Thank you for choosing [facility]  for your care. Our goal is always to provide you with excellent care. Hearing back from our patients is one way we can continue to improve our services. Please take a few minutes to complete the written survey that you may receive in the mail after your visit with us. Thank you!             Your Updated Medication List - Protect others around you: Learn how to safely use, store and throw away your medicines at www.disposemymeds.org.          This list is accurate as of: 8/21/17 10:14 AM.  Always use your most recent med list.                   Brand Name Dispense Instructions for use Diagnosis    acetylcysteine 600 MG Caps capsule    N-ACETYL CYSTEINE    30 capsule    Take 2 400 mg caps two times daily for total daily dose of 800 mg        albuterol 108 (90 BASE) MCG/ACT Inhaler    PROAIR HFA/PROVENTIL HFA/VENTOLIN HFA    1 Inhaler    Inhale 2 puffs into the lungs every 6 hours as needed for shortness of breath / dyspnea or wheezing    Exercise-induced asthma        ARIPiprazole 2 MG tablet    ABILIFY    15 tablet    Take 0.5 tablets (1 mg) by mouth daily    Major depressive disorder, recurrent episode, moderate (H)       aspirin 81 MG EC tablet     30 tablet    Take 1 tablet (81 mg) by mouth daily    Kidney replaced by transplant       BENADRYL 25 MG capsule   Generic drug:  diphenhydrAMINE      Take 25 mg by mouth At Bedtime.        blood glucose monitoring lancets     1 Box    Use to test blood sugar 2 times daily or as directed.    Type 2 diabetes mellitus with peripheral neuropathy (H)       * blood glucose monitoring meter device kit    no brand specified    1 kit    Use to test blood sugar 2 times daily or as directed.    Type 2 diabetes mellitus with peripheral neuropathy (H)       * blood glucose monitoring meter device kit           blood glucose monitoring test strip    no brand specified    100 strip    Use to test blood sugars 2 times daily or as directed    Type 2 diabetes mellitus with peripheral neuropathy (H)       cyanocobalamin 1000 MCG tablet    vitamin  B-12    30 tablet    Take 1 tablet by mouth daily.    CKD (chronic kidney disease) stage 5, GFR less than 15 ml/min (H)       cycloSPORINE modified 25 MG capsule    GENERIC EQUIVALENT    240 capsule    Take 4 capsules (100 mg) by mouth 2 times daily    Kidney replaced by transplant       econazole nitrate 1 % cream     85 g    Apply topically daily    DM neuro manif type II, Dermatophytosis of foot       furosemide 20 MG tablet    LASIX    90 tablet    Take 1 tablet (20 mg) by mouth daily    Generalized edema       latanoprost 0.005 % ophthalmic solution    XALATAN    2.5 mL    Place 1 drop into both eyes At Bedtime    Mild stage glaucoma       metFORMIN 500 MG 24 hr tablet    GLUCOPHAGE-XR    90 tablet    Take 3 tablets (1,500 mg) by mouth daily (with dinner)    Type 2 diabetes mellitus with diabetic polyneuropathy, without long-term current use of insulin (H)       mycophenolate 250 MG capsule     GENERIC EQUIVALENT    240 capsule    Take 4 capsules (1,000 mg) by mouth 2 times daily    Kidney transplanted       omeprazole 40 MG capsule    priLOSEC    90 capsule    Take 1 capsule (40 mg) by mouth daily    Gastroesophageal reflux disease without esophagitis       ondansetron 4 MG ODT tab    ZOFRAN-ODT    20 tablet    Take 1 tablet (4 mg) by mouth every 6 hours as needed    Chronic kidney disease, stage V (H)       order for DME     1 Units    Walker with front wheels and a seat.    Fall, initial encounter       prazosin 5 MG capsule    MINIPRESS    90 capsule    Take 3 capsules (15 mg) by mouth At Bedtime    Nightmares associated with chronic post-traumatic stress disorder       REFRESH OP      Apply to eye as needed Both eyes        replens Gel     35 g    Use vaginally as needed. Can use up to 3 times per week.    Vaginal dryness       simvastatin 20 MG tablet    ZOCOR    90 tablet    Take 1 tablet (20 mg) by mouth At Bedtime    Chronic kidney disease, stage V (H)       sulfamethoxazole-trimethoprim 400-80 MG per tablet    BACTRIM/SEPTRA    90 tablet    Take 1 tablet by mouth daily    Immunosuppression (H)       topiramate 200 MG tablet    TOPAMAX    60 tablet    Take 1 tablet (200 mg) by mouth 2 times daily    Morbid obesity due to excess calories (H)       TYLENOL 325 MG tablet   Generic drug:  acetaminophen      Take 325-650 mg by mouth as needed        vilazodone 40 MG Tabs tablet    VIIBRYD    30 tablet    Take 1 tablet (40 mg) by mouth daily    Major depressive disorder, recurrent episode, moderate (H)       vitamin D 1000 UNITS capsule     30 capsule    2,000 Units        * Notice:  This list has 2 medication(s) that are the same as other medications prescribed for you. Read the directions carefully, and ask your doctor or other care provider to review them with you.

## 2017-08-22 ENCOUNTER — OFFICE VISIT (OUTPATIENT)
Dept: PSYCHIATRY | Facility: CLINIC | Age: 62
End: 2017-08-22
Attending: PSYCHOLOGIST
Payer: MEDICARE

## 2017-08-22 DIAGNOSIS — F43.10 POSTTRAUMATIC STRESS DISORDER: ICD-10-CM

## 2017-08-22 DIAGNOSIS — F33.1 MAJOR DEPRESSIVE DISORDER, RECURRENT EPISODE, MODERATE (H): Primary | ICD-10-CM

## 2017-08-22 DIAGNOSIS — F41.1 GENERALIZED ANXIETY DISORDER: ICD-10-CM

## 2017-08-22 NOTE — MR AVS SNAPSHOT
After Visit Summary   8/22/2017    Elizabeth Palma    MRN: [id number]           Patient Information     Date Of Birth          1955        Visit Information        Provider Department      8/22/2017 10:30 AM Era Gonzalez, PhD  Psychiatry Clinic        Today's Diagnoses     Major depressive disorder, recurrent episode, moderate (H)    -  1    Posttraumatic stress disorder        Generalized anxiety disorder           Follow-ups after your visit        Follow-up notes from your care team     Return in about 1 week (around 8/29/2017).      Your next 10 appointments already scheduled     Sep 12, 2017  1:00 PM CDT   Adult Med Follow UP with Chaparrita Hatch MD   [facility] ([facility])    [address]  [facility] [address]   [phone]            Sep 15, 2017  9:00 AM CDT   Lab with [facility]    [facility] ([facility])    [address] [place] [id number] [phone]            Sep 15, 2017  9:30 AM CDT   NUTRITION VISIT with Francesca Danielle RD   [facility] ([facility])    [address] [place] [id number] [phone]            Sep 25, 2017  9:00 AM CDT   (Arrive by 8:45 AM)   Return Visit with Horacio Sheridan MD   [facility] ([facility])    [address] [place] [id number] [phone]            Oct 13, 2017  9:00 AM CDT   LAB with [facility]    [facility] ([facility])    [address] [place] [id number] [phone]           Patient must bring picture ID. Patient should be prepared to give a urine specimen  Please do not eat 10-12 hours before your appointment if you are coming in fasting for labs on lipids, cholesterol, or glucose (sugar). Pregnant women should follow their Care Team  instructions. Water with medications is okay. Do not drink coffee or other fluids. If you have concerns about taking  your medications, please ask at office or if scheduling via [employer], send a message by clicking on Secure Messaging, Message Your Care Team.            Oct 13, 2017  9:30 AM CDT   NUTRITION VISIT with SUJATA Stapleton [facility] ([facility])    [address] [place] [id number] [phone]            Nov 17, 2017  9:00 AM CST   LAB with  LAB   [facility] ([facility])    [address] [place] [address] [id number] [phone]           Patient must bring picture ID. Patient should be prepared to give a urine specimen  Please do not eat 10-12 hours before your appointment if you are coming in fasting for labs on lipids, cholesterol, or glucose (sugar). Pregnant women should follow their Care Team instructions. Water with medications is okay. Do not drink coffee or other fluids. If you have concerns about taking  your medications, please ask at office or if scheduling via [employer], send a message by clicking on Secure Messaging, Message Your Care Team.            Nov 17, 2017  9:30 AM CST   NUTRITION VISIT with SUJATA Stapleton [facility] ([facility])    [address] [place] [address]   [phone]            Nov 20, 2017 10:45 AM CST   (Arrive by 10:30 AM)   Return Visit with Prakash Irene MD   [facility] Medical Weight Management ([facility])    [address] [place] [address]   [phone]            Nov 20, 2017  1:00 PM CST   Return Visit with Javier Tuttle DPM   [facility] ([facility])    [address] [place] [address] [phone]              Who to  contact     Please call your clinic at [phone] to:    Ask questions about your health    Make or cancel appointments    Discuss your medicines    Learn about your test results    Speak to your doctor   If you have compliments or concerns about an experience at your clinic, or if you wish to file a complaint, please contact [facility] Patient Relations at [phone] or email us at [email or web address]         Additional Information About Your Visit        [employer] Information     [employer] gives you secure access to your electronic health record. If you see a primary care provider, you can also send messages to your care team and make appointments. If you have questions, please call your primary care clinic.  If you do not have a primary care provider, please call [phone] and they will assist you.      [employer] is an electronic gateway that provides easy, online access to your medical records. With [employer], you can request a clinic appointment, read your test results, renew a prescription or communicate with your care team.     To access your existing account, please contact your [facility] or call [phone] for assistance.        Care EveryWhere ID     This is your Care EveryWhere ID. This could be used by other organizations to access your [facility] medical records  [id number]         Blood Pressure from Last 3 Encounters:   08/21/17 102/70   08/17/17 110/75   08/14/17 120/80    Weight from Last 3 Encounters:   08/17/17 77.8 kg (171 lb 9.6 oz)   08/14/17 76.6 kg (168 lb 14.4 oz)   08/08/17 78 kg (172 lb)              Today, you had the following     No orders found for display         Today's Medication Changes          These changes are accurate as of: 8/22/17 11:59 PM.  If you have any questions, ask your nurse or doctor.               These medicines have changed or have updated prescriptions.        Dose/Directions     cyanocobalamin 1000 MCG tablet   Commonly known as:  vitamin  B-12   This may have changed:  when to take this   Used for:  CKD (chronic kidney disease) stage 5, GFR less than 15 ml/min (H)        Dose:  1000 mcg   Take 1 tablet by mouth daily.   Quantity:  30 tablet   Refills:  0       econazole nitrate 1 % cream   This may have changed:    - when to take this  - reasons to take this   Used for:  DM neuro manif type II, Dermatophytosis of foot        Apply topically daily   Quantity:  85 g   Refills:  3                Primary Care Provider Office Phone # Fax #    Horacio Sheridan [phone]       [address] [place] [address] [address] [place] [phone]        Equal Access to Services     LINNEA HIDALGO : Jennifer Quevedo, radha aleman, nick horowitz, ty fam. So [facility] [phone].    ATENCIÓN: Si habla español, tiene a goetz disposición servicios gratuitos de asistencia lingüística. Llame al [phone].    We comply with applicable federal civil rights laws and [place] laws. We do not discriminate on the basis of race, color, national origin, age, disability sex, sexual orientation or gender identity.            Thank you!     Thank you for choosing PSYCHIATRY CLINIC  for your care. Our goal is always to provide you with excellent care. Hearing back from our patients is one way we can continue to improve our services. Please take a few minutes to complete the written survey that you may receive in the mail after your visit with us. Thank you!             Your Updated Medication List - Protect others around you: Learn how to safely use, store and throw away your medicines at www.disposemymeds.org.          This list is accurate as of: 8/22/17 11:59 PM.  Always use your most recent med list.                   Brand Name Dispense Instructions for use Diagnosis    acetylcysteine 600 MG Caps capsule    N-ACETYL CYSTEINE    30 capsule    Take 2 400 mg caps  two times daily for total daily dose of 800 mg        albuterol 108 (90 BASE) MCG/ACT Inhaler    PROAIR HFA/PROVENTIL HFA/VENTOLIN HFA    1 Inhaler    Inhale 2 puffs into the lungs every 6 hours as needed for shortness of breath / dyspnea or wheezing    Exercise-induced asthma       ARIPiprazole 2 MG tablet    ABILIFY    15 tablet    Take 0.5 tablets (1 mg) by mouth daily    Major depressive disorder, recurrent episode, moderate (H)       aspirin 81 MG EC tablet     30 tablet    Take 1 tablet (81 mg) by mouth daily    Kidney replaced by transplant       BENADRYL 25 MG capsule   Generic drug:  diphenhydrAMINE      Take 25 mg by mouth At Bedtime.        blood glucose monitoring lancets     1 Box    Use to test blood sugar 2 times daily or as directed.    Type 2 diabetes mellitus with peripheral neuropathy (H)       * blood glucose monitoring meter device kit    no brand specified    1 kit    Use to test blood sugar 2 times daily or as directed.    Type 2 diabetes mellitus with peripheral neuropathy (H)       * blood glucose monitoring meter device kit           blood glucose monitoring test strip    no brand specified    100 strip    Use to test blood sugars 2 times daily or as directed    Type 2 diabetes mellitus with peripheral neuropathy (H)       cyanocobalamin 1000 MCG tablet    vitamin  B-12    30 tablet    Take 1 tablet by mouth daily.    CKD (chronic kidney disease) stage 5, GFR less than 15 ml/min (H)       econazole nitrate 1 % cream     85 g    Apply topically daily    DM neuro manif type II, Dermatophytosis of foot       furosemide 20 MG tablet    LASIX    90 tablet    Take 1 tablet (20 mg) by mouth daily    Generalized edema       latanoprost 0.005 % ophthalmic solution    XALATAN    2.5 mL    Place 1 drop into both eyes At Bedtime    Mild stage glaucoma       metFORMIN 500 MG 24 hr tablet    GLUCOPHAGE-XR    90 tablet    Take 3 tablets (1,500 mg) by mouth daily (with dinner)    Type 2 diabetes mellitus  with diabetic polyneuropathy, without long-term current use of insulin (H)       mycophenolate 250 MG capsule    GENERIC EQUIVALENT    240 capsule    Take 4 capsules (1,000 mg) by mouth 2 times daily    Kidney transplanted       omeprazole 40 MG capsule    priLOSEC    90 capsule    Take 1 capsule (40 mg) by mouth daily    Gastroesophageal reflux disease without esophagitis       ondansetron 4 MG ODT tab    ZOFRAN-ODT    20 tablet    Take 1 tablet (4 mg) by mouth every 6 hours as needed    Chronic kidney disease, stage V (H)       order for DME     1 Units    Walker with front wheels and a seat.    Fall, initial encounter       prazosin 5 MG capsule    MINIPRESS    90 capsule    Take 3 capsules (15 mg) by mouth At Bedtime    Nightmares associated with chronic post-traumatic stress disorder       REFRESH OP      Apply to eye as needed Both eyes        replens Gel     35 g    Use vaginally as needed. Can use up to 3 times per week.    Vaginal dryness       simvastatin 20 MG tablet    ZOCOR    90 tablet    Take 1 tablet (20 mg) by mouth At Bedtime    Chronic kidney disease, stage V (H)       sulfamethoxazole-trimethoprim 400-80 MG per tablet    BACTRIM/SEPTRA    90 tablet    Take 1 tablet by mouth daily    Immunosuppression (H)       topiramate 200 MG tablet    TOPAMAX    60 tablet    Take 1 tablet (200 mg) by mouth 2 times daily    Morbid obesity due to excess calories (H)       TYLENOL 325 MG tablet   Generic drug:  acetaminophen      Take 325-650 mg by mouth as needed        vilazodone 40 MG Tabs tablet    VIIBRYD    30 tablet    Take 1 tablet (40 mg) by mouth daily    Major depressive disorder, recurrent episode, moderate (H)       vitamin D 1000 UNITS capsule     30 capsule    2,000 Units        * Notice:  This list has 2 medication(s) that are the same as other medications prescribed for you. Read the directions carefully, and ask your doctor or other care provider to review them with you.

## 2017-08-25 ENCOUNTER — ALLIED HEALTH/NURSE VISIT (OUTPATIENT)
Dept: SURGERY | Facility: CLINIC | Age: 62
End: 2017-08-25

## 2017-08-25 DIAGNOSIS — Z79.899 ENCOUNTER FOR LONG-TERM CURRENT USE OF MEDICATION: ICD-10-CM

## 2017-08-25 DIAGNOSIS — Z48.298 AFTERCARE FOLLOWING ORGAN TRANSPLANT: ICD-10-CM

## 2017-08-25 DIAGNOSIS — Z94.0 KIDNEY REPLACED BY TRANSPLANT: ICD-10-CM

## 2017-08-25 LAB
ANION GAP SERPL CALCULATED.3IONS-SCNC: 10 MMOL/L (ref 3–14)
BUN SERPL-MCNC: 11 MG/DL (ref 7–30)
CALCIUM SERPL-MCNC: 9.1 MG/DL (ref 8.5–10.1)
CHLORIDE SERPL-SCNC: 108 MMOL/L (ref 94–109)
CO2 SERPL-SCNC: 23 MMOL/L (ref 20–32)
CREAT SERPL-MCNC: 0.98 MG/DL (ref 0.52–1.04)
CYCLOSPORINE BLD LC/MS/MS-MCNC: 71 UG/L (ref 50–400)
ERYTHROCYTE [DISTWIDTH] IN BLOOD BY AUTOMATED COUNT: 14.1 % (ref 10–15)
GFR SERPL CREATININE-BSD FRML MDRD: 58 ML/MIN/1.7M2
GLUCOSE SERPL-MCNC: 136 MG/DL (ref 70–99)
HCT VFR BLD AUTO: 43.1 % (ref 35–47)
HGB BLD-MCNC: 13.3 G/DL (ref 11.7–15.7)
MCH RBC QN AUTO: 26.7 PG (ref 26.5–33)
MCHC RBC AUTO-ENTMCNC: 30.9 G/DL (ref 31.5–36.5)
MCV RBC AUTO: 87 FL (ref 78–100)
PLATELET # BLD AUTO: 175 10E9/L (ref 150–450)
POTASSIUM SERPL-SCNC: 3.9 MMOL/L (ref 3.4–5.3)
RBC # BLD AUTO: 4.98 10E12/L (ref 3.8–5.2)
SODIUM SERPL-SCNC: 141 MMOL/L (ref 133–144)
TME LAST DOSE: NORMAL H
WBC # BLD AUTO: 3.7 10E9/L (ref 4–11)

## 2017-08-25 NOTE — PROGRESS NOTES
"Nutrition Reassessment  Reason For Visit:  Elizabeth Palma is a 61 year-old female with type 2 DM (oral med management) presenting today for nutrition follow-up, s/p \"gastric bypass in 1990 at [facility]\" per Pt report.  She is seeing medical weight management.  She was referred by Dr. Irene.  Note: Pt had a kidney transplant on March 20, 2014.    Pt was accompanied by her .     Anthropometrics:  Height: 61\"  Pre-op Weight: 265 lbs per Pt report; lowest weight after bariatric surgery: 165-170 lbs (25 years ago)  (Pt reported that she initially was at 215 lbs in 2015 prior to seeing writer.)    Current Weight: 169.6 lbs (-2.6 lbs over the past month) with BMI of 32.6.    Current Vitamins/Minerals: 3000 International Units vitamin D/day, Calcium, vitamin C, vitamin B12 daily, B-complex  *Pt stated that she was told not to take other vitamins/minerals by MD.    Nutrition History:  Lactose intolerance ever since bariatric surgery.  Pt stated that she has osteoporosis; however, she stated that her doctors don't want her to take any vitamins or minerals due to her kidney transplant.    Diet/Nutrition Intake Hx: Pt reports her intake varies due to fluctuating appetite. Per pt there are days when she eats 3 meals but on other days she may not eat much at all or nibble on something through out the day. Pt also states her intake is affected by nausea 2/2 her anti-rejection medications. However pt reports she tries to make healthy choices (lower in calorie). Pt is also limited in protein choices that she likes - pt reports she does not take much dairy, does not like beans and lentils, keeps kosher. Advised pt to try to time her meals and eat every 4 hours instead of grazing but pt refused stating she would rather not eat at all.     *Pt stated that she will not record food intake due to the fact that every time she does this, she simply does not eat as she doesn't want to record.  She stated that her therapist " strongly advises against recording food intake for this reason.      Physical Activity Note: Pt reports she has a tendency to break bones so she is limited with what exercises she does. Pt states there is a long hallway in the building that she needs to walk when going out. Pt states she does not walk for exercises but walks only to get ADLs done.    -She plans to get a walker soon.     Progress with Previous Goals:    1. Avoid grazing through, Eat 3 meals with lean protein at each meal, along with a non-starchy vegetable or whole fruit, up to 1/2 c carb at a meal. - Pt still skips breakfast, but is adding protein (egg) to salad for lunch and getting in some protein at dinner.    -Try hard-boiled eggs to put on your salad or to have later in the day to make up for skipping breakfast.     2. If needing a snack, have vegetables (give at least 2 hours between a meals and a snack). - Pt sometimes does snack, but mostly avoids this.   4. Restart exercise routine (at least 1 time/week walking for 15 min/day slowly increase by 5 minutes a day to a goal of at least 30 minutes or Try a gentle group class) - Pt did start walking more, but fell 2 weeks ago while playing miniature golf.     Nutrition Prescription:  Grams Protein: 60 (minimum)  Amount of Fluid: 48-64 oz    Nutrition Diagnosis  Previous: Obesity related to excessive energy intake as evidenced by BMI > 30.- continues    Intervention  Intervention At Appointment:  Materials/Education provided:  Praised Pt for weight loss, discussing importance of continuing with the goals.  Discussed challenges (lack of appetite as her son's family along with her 3-year old grandson moved to [place]).  Discussed meal planning and optimizing protein.  Encouraged exercise.  Gave encouragement and support to continue.       Patient Understanding: good  Expected Compliance: good    Goals:    1. Avoid grazing through, Eat 3 meals with lean protein at each meal, along with a non-starchy  vegetable or whole fruit, up to 1/2 c carb at a meal.   -Try hard-boiled eggs to put on your salad or to have later in the day to make up for skipping breakfast.     2. If needing a snack, have vegetables (give at least 2 hours between a meals and a snack).  3. Restart exercise routine with walker (at least 1 time/week walking for 15 min/day slowly increase by 5 minutes a day to a goal of at least 30 minutes or Try a gentle group class)    Follow-Up: 1 month    Time spent with patient: 15 minutes.  Francesca Danielle, MS, RDN, LDN, CLT  Pager: [phone]

## 2017-08-25 NOTE — PATIENT INSTRUCTIONS
Goals:    1. Avoid grazing through, Eat 3 meals with lean protein at each meal, along with a non-starchy vegetable or whole fruit, up to 1/2 c carb at a meal.   -Try hard-boiled eggs to put on your salad or to have later in the day to make up for skipping breakfast.     2. If needing a snack, have vegetables (give at least 2 hours between a meals and a snack).  3. Restart exercise routine with walker (at least 1 time/week walking for 15 min/day slowly increase by 5 minutes a day to a goal of at least 30 minutes or Try a gentle group class)

## 2017-08-25 NOTE — MR AVS SNAPSHOT
MRN:[id number]                      After Visit Summary   8/25/2017    Elizabeth Palma    MRN: [id number]           Visit Information        Provider Department      8/25/2017 9:30 AM Francesca Danielle RD [facility]        Your next 10 appointments already scheduled     Aug 28, 2017 10:00 AM CDT   Family Therapy with Era Gonzalez, PhD    Psychiatry Clinic ([facility])    [place] [id number]  [address] [address]   [phone]            Sep 12, 2017  1:00 PM CDT   Adult Med Follow UP with Chaparrita Hatch MD   [facility]    [address]  [facility] [address] [phone]            Sep 15, 2017  9:00 AM CDT   Lab with  LAB    [facility] ([facility])    [address] [place] [address]  [address] [facility] [id number] [phone]            Sep 15, 2017  9:30 AM CDT   NUTRITION VISIT with Francesca Danielle RD   [facility] ([facility])    [address] [place] [id number] [phone]            Sep 25, 2017  9:00 AM CDT   (Arrive by 8:45 AM)   Return Visit with Horacio Sheridan MD   [facility] ([facility])    [address] [place] [address]  [address] [facility] [id number] [phone]            Oct 13, 2017  9:00 AM CDT   LAB with  LAB    [facility] ([facility])    [address] [place] [address] [id number] [phone]           Patient must bring picture ID. Patient should be prepared to give a urine specimen  Please do not eat 10-12 hours before your appointment if you are coming in fasting for labs on lipids, cholesterol, or glucose (sugar). Pregnant women should follow their Care Team instructions. Water with medications is okay. Do not drink coffee or  other fluids. If you have concerns about taking  your medications, please ask at office or if scheduling via [employer], send a message by clicking on Secure Messaging, Message Your Care Team.            Oct 13, 2017  9:30 AM CDT   NUTRITION VISIT with Francesca Danielle RD   [facility] ([facility])    [address] [place] [id number] [phone]            Nov 17, 2017  9:00 AM CST   LAB with  LAB   [facility] ([facility])    [address] [place] [place] [id number] [phone]           Patient must bring picture ID. Patient should be prepared to give a urine specimen  Please do not eat 10-12 hours before your appointment if you are coming in fasting for labs on lipids, cholesterol, or glucose (sugar). Pregnant women should follow their Care Team instructions. Water with medications is okay. Do not drink coffee or other fluids. If you have concerns about taking  your medications, please ask at office or if scheduling via [employer], send a message by clicking on Secure Messaging, Message Your Care Team.            Nov 17, 2017  9:30 AM CST   NUTRITION VISIT with SUJATA Stapleton [facility] ([facility])    [address] [place] [id number] [phone]            Nov 20, 2017 10:45 AM CST   (Arrive by 10:30 AM)   Return Visit with Prakash Irene MD   [facility] Management ([facility])    [address] [place] [id number] [phone]              Care Instructions    Goals:    1. Avoid grazing through, Eat 3 meals with lean protein at each meal, along with a non-starchy vegetable or whole fruit, up to 1/2 c carb at a meal.   -Try hard-boiled eggs to put on your salad or to have later in the day to make up for skipping breakfast.      2. If needing a snack, have vegetables (give at least 2 hours between a meals and a snack).  4. Restart exercise routine with walker (at least 1 time/week walking for 15 min/day slowly increase by 5 minutes a day to a goal of at least 30 minutes or Try a gentle group class)         [email or web address] Information     [employer] gives you secure access to your electronic health record. If you see a primary care provider, you can also send messages to your care team and make appointments. If you have questions, please call your primary care clinic.  If you do not have a primary care provider, please call [phone] and they will assist you.      [employer] is an electronic gateway that provides easy, online access to your medical records. With [employer], you can request a clinic appointment, read your test results, renew a prescription or communicate with your care team.     To access your existing account, please contact your [facility] or call [phone] for assistance.        Care EveryWhere ID     This is your Care EveryWhere ID. This could be used by other organizations to access your [facility] medical records  [id number]        Equal Access to Services     LINNEA HIDALGO : Hadpenny Quevedo, radha aleman, nick horowitz, ty fam. So [facility] [phone].    ATENCIÓN: Si habla español, tiene a goetz disposición servicios gratuitos de asistencia lingüística. Rachel al [phone].    We comply with applicable federal civil rights laws and [place] laws. We do not discriminate on the basis of race, color, national origin, age, disability sex, sexual orientation or gender identity.

## 2017-08-28 ENCOUNTER — OFFICE VISIT (OUTPATIENT)
Dept: PSYCHIATRY | Facility: CLINIC | Age: 62
End: 2017-08-28
Attending: PSYCHOLOGIST
Payer: MEDICARE

## 2017-08-28 DIAGNOSIS — Z94.0 KIDNEY REPLACED BY TRANSPLANT: ICD-10-CM

## 2017-08-28 DIAGNOSIS — F33.1 MAJOR DEPRESSIVE DISORDER, RECURRENT EPISODE, MODERATE (H): Primary | ICD-10-CM

## 2017-08-28 DIAGNOSIS — F43.10 POSTTRAUMATIC STRESS DISORDER: ICD-10-CM

## 2017-08-28 DIAGNOSIS — F41.1 GENERALIZED ANXIETY DISORDER: ICD-10-CM

## 2017-08-28 RX ORDER — CYCLOSPORINE 25 MG/1
125 CAPSULE, LIQUID FILLED ORAL 2 TIMES DAILY
Qty: 300 CAPSULE | Refills: 6 | Status: SHIPPED | OUTPATIENT
Start: 2017-08-28 | End: 2017-08-30

## 2017-08-28 RX ORDER — CYCLOSPORINE 25 MG/1
125 CAPSULE, LIQUID FILLED ORAL 2 TIMES DAILY
Qty: 240 CAPSULE | Refills: 6 | Status: SHIPPED | OUTPATIENT
Start: 2017-08-28 | End: 2017-08-28

## 2017-08-28 NOTE — MR AVS SNAPSHOT
After Visit Summary   8/28/2017    Elizabeth Palma    MRN: [id number]           Patient Information     Date Of Birth          1955        Visit Information        Provider Department      8/28/2017 10:00 AM Era Gonzalez, PhD  Psychiatry [facility]        Today's Diagnoses     Major depressive disorder, recurrent episode, moderate (H)    -  1    Generalized anxiety disorder        Posttraumatic stress disorder           Follow-ups after your visit        Your next 10 appointments already scheduled     Sep 12, 2017  1:00 PM CDT   Adult Med Follow UP with Chaparrita Hatch MD   [facility] ([facility])    [address]  [facility] [address]   [phone]            Sep 15, 2017  9:00 AM CDT   Lab with [facility]    [facility] ([facility])    [address] [place] [id number] [phone]            Sep 15, 2017  9:30 AM CDT   NUTRITION VISIT with Francesca Danielle RD   [facility] ([facility])    [address] [place] [address]   [phone]            Sep 25, 2017  9:00 AM CDT   (Arrive by 8:45 AM)   Return Visit with Horacio Sheridan MD   [facility] ([facility])    [address] [place] [id number] [phone]            Oct 13, 2017  9:00 AM CDT   LAB with [facility] LAB   [facility] ([facility])    [address] [place] [phone]           Patient must bring picture ID. Patient should be prepared to give a urine specimen  Please do not eat 10-12 hours before your appointment if you are coming in fasting for labs on lipids, cholesterol, or glucose (sugar). Pregnant women should follow their Care Team instructions. Water with medications is okay. Do not drink coffee or other fluids. If you  have concerns about taking  your medications, please ask at office or if scheduling via [employer], send a message by clicking on Secure Messaging, Message Your Care Team.            Oct 13, 2017  9:30 AM CDT   NUTRITION VISIT with SUJATA Stapleton [facility] ([facility])    [address] [place] [id number] [phone]            Nov 17, 2017  9:00 AM CST   LAB with  LAB   [facility] ([facility])    [address] [place] [id number] [phone]           Patient must bring picture ID. Patient should be prepared to give a urine specimen  Please do not eat 10-12 hours before your appointment if you are coming in fasting for labs on lipids, cholesterol, or glucose (sugar). Pregnant women should follow their Care Team instructions. Water with medications is okay. Do not drink coffee or other fluids. If you have concerns about taking  your medications, please ask at office or if scheduling via [employer], send a message by clicking on Secure Messaging, Message Your Care Team.            Nov 17, 2017  9:30 AM CST   NUTRITION VISIT with SUJATA Stapleton [facility] ([facility])    [address] [place] [id number] [phone]            Nov 20, 2017 10:45 AM CST   (Arrive by 10:30 AM)   Return Visit with Prakash Irene MD   [facility] ([facility])    [address] [place] [id number] [phone]            Nov 20, 2017  1:00 PM CST   Return Visit with Javier Tuttle DPM   [facility] ([facility])    [address] [place] [address] [phone]              Who to contact     Please call your clinic at [phone] to:    Ask questions about your  health    Make or cancel appointments    Discuss your medicines    Learn about your test results    Speak to your doctor   If you have compliments or concerns about an experience at your clinic, or if you wish to file a complaint, please contact [facility] Patient Relations at [phone] or email us at [email or web address]         Additional Information About Your Visit        MyChart Information     [employer] gives you secure access to your electronic health record. If you see a primary care provider, you can also send messages to your care team and make appointments. If you have questions, please call your primary care clinic.  If you do not have a primary care provider, please call [phone] and they will assist you.      [employer] is an electronic gateway that provides easy, online access to your medical records. With [employer], you can request a clinic appointment, read your test results, renew a prescription or communicate with your care team.     To access your existing account, please contact your [facility] or call [phone] for assistance.        Care EveryWhere ID     This is your Care EveryWhere ID. This could be used by other organizations to access your [place] medical records  [id number]         Blood Pressure from Last 3 Encounters:   08/21/17 102/70   08/17/17 110/75   08/14/17 120/80    Weight from Last 3 Encounters:   08/17/17 77.8 kg (171 lb 9.6 oz)   08/14/17 76.6 kg (168 lb 14.4 oz)   08/08/17 78 kg (172 lb)              Today, you had the following     No orders found for display         Today's Medication Changes          These changes are accurate as of: 8/28/17  5:51 PM.  If you have any questions, ask your nurse or doctor.               Start taking these medicines.        Dose/Directions    cycloSPORINE modified 25 MG capsule   Commonly known as:  GENERIC EQUIVALENT   Used for:  Kidney replaced by transplant    Started by:  Alexandria Barragan LPN        Dose:  125 mg   Take 5 capsules (125 mg) by mouth 2 times daily   Quantity:  300 capsule   Refills:  6         These medicines have changed or have updated prescriptions.        Dose/Directions    cyanocobalamin 1000 MCG tablet   Commonly known as:  vitamin  B-12   This may have changed:  when to take this   Used for:  CKD (chronic kidney disease) stage 5, GFR less than 15 ml/min (H)        Dose:  1000 mcg   Take 1 tablet by mouth daily.   Quantity:  30 tablet   Refills:  0       econazole nitrate 1 % cream   This may have changed:    - when to take this  - reasons to take this   Used for:  DM neuro manif type II, Dermatophytosis of foot        Apply topically daily   Quantity:  85 g   Refills:  3            Where to get your medicines      These medications were sent to [facility] - [place], [place] - [address]  [address] [facility] [address]    Hours:  Mon-Fri 8:30am-5:00pm Toll Free [phone] Phone:  [phone]     cycloSPORINE modified 25 MG capsule                Primary Care Provider Office Phone # Fax #    Horacio Sheridan [phone]       [address]  [facility] [address]        Equal Access to Services     LINNEA HIDALGO AH: Jennifer Quevedo, waaxda luqadaha, qaybta kaalmada adeegyada, ty fam. So [facility] [phone].    ATENCIÓN: Si habla español, tiene a goetz disposición servicios gratuitos de asistencia lingüística. Llame al [phone].    We comply with applicable federal civil rights laws and [place] laws. We do not discriminate on the basis of race, color, national origin, age, disability sex, sexual orientation or gender identity.            Thank you!     Thank you for choosing PS[facility]RY CLINIC  for your care. Our goal is always to provide you with excellent care. Hearing back from our patients is one way we can continue to improve  our services. Please take a few minutes to complete the written survey that you may receive in the mail after your visit with us. Thank you!             Your Updated Medication List - Protect others around you: Learn how to safely use, store and throw away your medicines at www.disposemymeds.org.          This list is accurate as of: 8/28/17  5:51 PM.  Always use your most recent med list.                   Brand Name Dispense Instructions for use Diagnosis    acetylcysteine 600 MG Caps capsule    N-ACETYL CYSTEINE    30 capsule    Take 2 400 mg caps two times daily for total daily dose of 800 mg        albuterol 108 (90 BASE) MCG/ACT Inhaler    PROAIR HFA/PROVENTIL HFA/VENTOLIN HFA    1 Inhaler    Inhale 2 puffs into the lungs every 6 hours as needed for shortness of breath / dyspnea or wheezing    Exercise-induced asthma       ARIPiprazole 2 MG tablet    ABILIFY    15 tablet    Take 0.5 tablets (1 mg) by mouth daily    Major depressive disorder, recurrent episode, moderate (H)       aspirin 81 MG EC tablet     30 tablet    Take 1 tablet (81 mg) by mouth daily    Kidney replaced by transplant       BENADRYL 25 MG capsule   Generic drug:  diphenhydrAMINE      Take 25 mg by mouth At Bedtime.        blood glucose monitoring lancets     1 Box    Use to test blood sugar 2 times daily or as directed.    Type 2 diabetes mellitus with peripheral neuropathy (H)       * blood glucose monitoring meter device kit    no brand specified    1 kit    Use to test blood sugar 2 times daily or as directed.    Type 2 diabetes mellitus with peripheral neuropathy (H)       * blood glucose monitoring meter device kit           blood glucose monitoring test strip    no brand specified    100 strip    Use to test blood sugars 2 times daily or as directed    Type 2 diabetes mellitus with peripheral neuropathy (H)       cyanocobalamin 1000 MCG tablet    vitamin  B-12    30 tablet    Take 1 tablet by mouth daily.    CKD (chronic kidney  disease) stage 5, GFR less than 15 ml/min (H)       cycloSPORINE modified 25 MG capsule    GENERIC EQUIVALENT    300 capsule    Take 5 capsules (125 mg) by mouth 2 times daily    Kidney replaced by transplant       econazole nitrate 1 % cream     85 g    Apply topically daily    DM neuro manif type II, Dermatophytosis of foot       furosemide 20 MG tablet    LASIX    90 tablet    Take 1 tablet (20 mg) by mouth daily    Generalized edema       latanoprost 0.005 % ophthalmic solution    XALATAN    2.5 mL    Place 1 drop into both eyes At Bedtime    Mild stage glaucoma       metFORMIN 500 MG 24 hr tablet    GLUCOPHAGE-XR    90 tablet    Take 3 tablets (1,500 mg) by mouth daily (with dinner)    Type 2 diabetes mellitus with diabetic polyneuropathy, without long-term current use of insulin (H)       mycophenolate 250 MG capsule    GENERIC EQUIVALENT    240 capsule    Take 4 capsules (1,000 mg) by mouth 2 times daily    Kidney transplanted       omeprazole 40 MG capsule    priLOSEC    90 capsule    Take 1 capsule (40 mg) by mouth daily    Gastroesophageal reflux disease without esophagitis       ondansetron 4 MG ODT tab    ZOFRAN-ODT    20 tablet    Take 1 tablet (4 mg) by mouth every 6 hours as needed    Chronic kidney disease, stage V (H)       order for DME     1 Units    Walker with front wheels and a seat.    Fall, initial encounter       prazosin 5 MG capsule    MINIPRESS    90 capsule    Take 3 capsules (15 mg) by mouth At Bedtime    Nightmares associated with chronic post-traumatic stress disorder       REFRESH OP      Apply to eye as needed Both eyes        replens Gel     35 g    Use vaginally as needed. Can use up to 3 times per week.    Vaginal dryness       simvastatin 20 MG tablet    ZOCOR    90 tablet    Take 1 tablet (20 mg) by mouth At Bedtime    Chronic kidney disease, stage V (H)       sulfamethoxazole-trimethoprim 400-80 MG per tablet    BACTRIM/SEPTRA    90 tablet    Take 1 tablet by mouth daily     Immunosuppression (H)       topiramate 200 MG tablet    TOPAMAX    60 tablet    Take 1 tablet (200 mg) by mouth 2 times daily    Morbid obesity due to excess calories (H)       TYLENOL 325 MG tablet   Generic drug:  acetaminophen      Take 325-650 mg by mouth as needed        vilazodone 40 MG Tabs tablet    VIIBRYD    30 tablet    Take 1 tablet (40 mg) by mouth daily    Major depressive disorder, recurrent episode, moderate (H)       vitamin D 1000 UNITS capsule     30 capsule    2,000 Units        * Notice:  This list has 2 medication(s) that are the same as other medications prescribed for you. Read the directions carefully, and ask your doctor or other care provider to review them with you.
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
## 2017-08-29 ENCOUNTER — OFFICE VISIT (OUTPATIENT)
Dept: PSYCHIATRY | Facility: CLINIC | Age: 62
End: 2017-08-29
Attending: PSYCHOLOGIST
Payer: MEDICARE

## 2017-08-29 DIAGNOSIS — F43.10 POSTTRAUMATIC STRESS DISORDER: ICD-10-CM

## 2017-08-29 DIAGNOSIS — F33.1 MAJOR DEPRESSIVE DISORDER, RECURRENT EPISODE, MODERATE (H): Primary | ICD-10-CM

## 2017-08-29 DIAGNOSIS — F41.1 GENERALIZED ANXIETY DISORDER: ICD-10-CM

## 2017-08-29 NOTE — MR AVS SNAPSHOT
After Visit Summary   8/29/2017    Elizabeth Palma    MRN: [id number]           Patient Information     Date Of Birth          1955        Visit Information        Provider Department      8/29/2017 10:30 AM Era Gonzalez, PhD  Psychiatry Clinic        Today's Diagnoses     Major depressive disorder, recurrent episode, moderate (H)    -  1    Generalized anxiety disorder        Posttraumatic stress disorder           Follow-ups after your visit        Follow-up notes from your care team     Return in about 1 week (around 9/5/2017).      Your next 10 appointments already scheduled     Sep 12, 2017  1:00 PM CDT   Adult Med Follow UP with Chaparrita Hatch MD   [facility] ([facility])    [address]  [facility] [address]   [phone]            Sep 15, 2017  9:00 AM CDT   Lab with Amartus LAB    [facility] ([facility])    [address] [place] [id number] [phone]            Sep 15, 2017  9:30 AM CDT   NUTRITION VISIT with Francesca Danielle RD   [facility] Surgical Weight Management ([facility])    [address] [place] [id number] [phone]            Sep 25, 2017  9:00 AM CDT   (Arrive by 8:45 AM)   Return Visit with Horacio Sheridan MD   [facility] ([facility])    [address] [place] [id number] [phone]            Oct 13, 2017  9:00 AM CDT   LAB with Amartus LAB    [facility] ([facility])    [address] [place] [id number] [phone]           Patient must bring picture ID. Patient should be prepared to give a urine specimen  Please do not eat 10-12 hours before your appointment if you are coming in fasting for labs on lipids, cholesterol, or glucose (sugar). Pregnant women should follow their Care Team  instructions. Water with medications is okay. Do not drink coffee or other fluids. If you have concerns about taking  your medications, please ask at office or if scheduling via [email or web address], send a message by clicking on Secure Messaging, Message Your Care Team.            Oct 13, 2017  9:30 AM CDT   NUTRITION VISIT with SUJATA Stapleton [facility] Management ([facility])    [address] [place] [id number] [phone]            Nov 17, 2017  9:00 AM CST   LAB with  LAB   [facility] ([facility])    [address] [place] [id number] [phone]           Patient must bring picture ID. Patient should be prepared to give a urine specimen  Please do not eat 10-12 hours before your appointment if you are coming in fasting for labs on lipids, cholesterol, or glucose (sugar). Pregnant women should follow their Care Team instructions. Water with medications is okay. Do not drink coffee or other fluids. If you have concerns about taking  your medications, please ask at office or if scheduling via [email or web address], send a message by clicking on Secure Messaging, Message Your Care Team.            Nov 17, 2017  9:30 AM CST   NUTRITION VISIT with SUJATA Stapleton [facility] ([facility])    [address] [place] [address]   [phone]            Nov 20, 2017 10:45 AM CST   (Arrive by 10:30 AM)   Return Visit with Prakash Irene MD   [facility] Medical Weight Management ([facility])    [address] [place] [address]   [phone]            Nov 20, 2017  1:00 PM CST   Return Visit with Javier Tuttle DPM   [facility] ([facility])    [address] [place] [id number] [phone]              Who to  contact     Please call your clinic at [phone] to:    Ask questions about your health    Make or cancel appointments    Discuss your medicines    Learn about your test results    Speak to your doctor   If you have compliments or concerns about an experience at your clinic, or if you wish to file a complaint, please contact [facility] Patient Relations at [phone] or email us at [email or web address]         Additional Information About Your Visit        [employer] Information     [employer] gives you secure access to your electronic health record. If you see a primary care provider, you can also send messages to your care team and make appointments. If you have questions, please call your primary care clinic.  If you do not have a primary care provider, please call [phone] and they will assist you.      [facility] is an electronic gateway that provides easy, online access to your medical records. With [facility], you can request a clinic appointment, read your test results, renew a prescription or communicate with your care team.     To access your existing account, please contact your [facility] or call [phone] for assistance.        Care EveryWhere ID     This is your Care EveryWhere ID. This could be used by other organizations to access your [facility] medical records  [id number]         Blood Pressure from Last 3 Encounters:   08/21/17 102/70   08/17/17 110/75   08/14/17 120/80    Weight from Last 3 Encounters:   08/17/17 77.8 kg (171 lb 9.6 oz)   08/14/17 76.6 kg (168 lb 14.4 oz)   08/08/17 78 kg (172 lb)              Today, you had the following     No orders found for display         Today's Medication Changes          These changes are accurate as of: 8/29/17 11:59 PM.  If you have any questions, ask your nurse or doctor.               These medicines have changed or have updated prescriptions.        Dose/Directions     cyanocobalamin 1000 MCG tablet   Commonly known as:  vitamin  B-12   This may have changed:  when to take this   Used for:  CKD (chronic kidney disease) stage 5, GFR less than 15 ml/min (H)        Dose:  1000 mcg   Take 1 tablet by mouth daily.   Quantity:  30 tablet   Refills:  0       econazole nitrate 1 % cream   This may have changed:    - when to take this  - reasons to take this   Used for:  DM neuro manif type II, Dermatophytosis of foot        Apply topically daily   Quantity:  85 g   Refills:  3                Primary Care Provider Office Phone # Fax #    Horacio Sheridan [phone]       [address] [place] [address] [address] [place] [id number]        Equal Access to Services     LINNEA HIDALGO : Jennifer Quevedo, radha aleman, nick horowitz, ty fam. So [facility] [phone].    ATENCIÓN: Si habla español, tiene a goetz disposición servicios gratuitos de asistencia lingüística. Llame al [phone].    We comply with applicable federal civil rights laws and [place] laws. We do not discriminate on the basis of race, color, national origin, age, disability sex, sexual orientation or gender identity.            Thank you!     Thank you for choosing PSYCHIATRY CLINIC  for your care. Our goal is always to provide you with excellent care. Hearing back from our patients is one way we can continue to improve our services. Please take a few minutes to complete the written survey that you may receive in the mail after your visit with us. Thank you!             Your Updated Medication List - Protect others around you: Learn how to safely use, store and throw away your medicines at www.disposemymeds.org.          This list is accurate as of: 8/29/17 11:59 PM.  Always use your most recent med list.                   Brand Name Dispense Instructions for use Diagnosis    acetylcysteine 600 MG Caps capsule    N-ACETYL CYSTEINE    30 capsule    Take 2 400 mg caps  two times daily for total daily dose of 800 mg        albuterol 108 (90 BASE) MCG/ACT Inhaler    PROAIR HFA/PROVENTIL HFA/VENTOLIN HFA    1 Inhaler    Inhale 2 puffs into the lungs every 6 hours as needed for shortness of breath / dyspnea or wheezing    Exercise-induced asthma       ARIPiprazole 2 MG tablet    ABILIFY    15 tablet    Take 0.5 tablets (1 mg) by mouth daily    Major depressive disorder, recurrent episode, moderate (H)       aspirin 81 MG EC tablet     30 tablet    Take 1 tablet (81 mg) by mouth daily    Kidney replaced by transplant       BENADRYL 25 MG capsule   Generic drug:  diphenhydrAMINE      Take 25 mg by mouth At Bedtime.        blood glucose monitoring lancets     1 Box    Use to test blood sugar 2 times daily or as directed.    Type 2 diabetes mellitus with peripheral neuropathy (H)       * blood glucose monitoring meter device kit    no brand specified    1 kit    Use to test blood sugar 2 times daily or as directed.    Type 2 diabetes mellitus with peripheral neuropathy (H)       * blood glucose monitoring meter device kit           blood glucose monitoring test strip    no brand specified    100 strip    Use to test blood sugars 2 times daily or as directed    Type 2 diabetes mellitus with peripheral neuropathy (H)       cyanocobalamin 1000 MCG tablet    vitamin  B-12    30 tablet    Take 1 tablet by mouth daily.    CKD (chronic kidney disease) stage 5, GFR less than 15 ml/min (H)       econazole nitrate 1 % cream     85 g    Apply topically daily    DM neuro manif type II, Dermatophytosis of foot       furosemide 20 MG tablet    LASIX    90 tablet    Take 1 tablet (20 mg) by mouth daily    Generalized edema       latanoprost 0.005 % ophthalmic solution    XALATAN    2.5 mL    Place 1 drop into both eyes At Bedtime    Mild stage glaucoma       metFORMIN 500 MG 24 hr tablet    GLUCOPHAGE-XR    90 tablet    Take 3 tablets (1,500 mg) by mouth daily (with dinner)    Type 2 diabetes mellitus  with diabetic polyneuropathy, without long-term current use of insulin (H)       mycophenolate 250 MG capsule    GENERIC EQUIVALENT    240 capsule    Take 4 capsules (1,000 mg) by mouth 2 times daily    Kidney transplanted       omeprazole 40 MG capsule    priLOSEC    90 capsule    Take 1 capsule (40 mg) by mouth daily    Gastroesophageal reflux disease without esophagitis       ondansetron 4 MG ODT tab    ZOFRAN-ODT    20 tablet    Take 1 tablet (4 mg) by mouth every 6 hours as needed    Chronic kidney disease, stage V (H)       order for DME     1 Units    Walker with front wheels and a seat.    Fall, initial encounter       prazosin 5 MG capsule    MINIPRESS    90 capsule    Take 3 capsules (15 mg) by mouth At Bedtime    Nightmares associated with chronic post-traumatic stress disorder       REFRESH OP      Apply to eye as needed Both eyes        replens Gel     35 g    Use vaginally as needed. Can use up to 3 times per week.    Vaginal dryness       simvastatin 20 MG tablet    ZOCOR    90 tablet    Take 1 tablet (20 mg) by mouth At Bedtime    Chronic kidney disease, stage V (H)       sulfamethoxazole-trimethoprim 400-80 MG per tablet    BACTRIM/SEPTRA    90 tablet    Take 1 tablet by mouth daily    Immunosuppression (H)       topiramate 200 MG tablet    TOPAMAX    60 tablet    Take 1 tablet (200 mg) by mouth 2 times daily    Morbid obesity due to excess calories (H)       TYLENOL 325 MG tablet   Generic drug:  acetaminophen      Take 325-650 mg by mouth as needed        vilazodone 40 MG Tabs tablet    VIIBRYD    30 tablet    Take 1 tablet (40 mg) by mouth daily    Major depressive disorder, recurrent episode, moderate (H)       vitamin D 1000 UNITS capsule     30 capsule    2,000 Units        * Notice:  This list has 2 medication(s) that are the same as other medications prescribed for you. Read the directions carefully, and ask your doctor or other care provider to review them with you.

## 2017-08-29 NOTE — PROGRESS NOTES
"Group Therapy N = 5 present; 70 min  Diagnoses: MDD (F33.1); NELDA(F41,1) PTSD (F43.10)  Co-Facilitators: Yosef Pacheco      S/O:  Reviewed Homework and Left Over from last week and set new homework.       Other Topics Discussed:      1. Focus on positives  Elizabeth shared that she is coming up on a big weekend. It is her birthday and her son's family from [place] is visiting, the same weekend her niece is getting . She said it is going to be \"sad\" because it will only last a weekend and then they are going to return to [place]. She said that she has been thinking about how hard it is going to be after her grandson Anand leaves. Members of the group shared their own experiences, one member commented that \"all good things coming to an end. But they have to end because they need to come again\". Suggestions were made to take plenty of pictures. Elizabeth accepted the suggestions and went on to talk about how she was planning her outfits, where to eat out, sleeping arrangements. She said that her grandson will be splitting his time between both his grandparents and Elizabeth thought that was reasonable. Group commented on her generosity.    2. Stepping out of comfort zone  When a peer shared some information about how she is an introvert and has limited social life, Elizabeth offered ways that her peer could try to socialize. She reached out to her bag with a smile and offered two tickets to the [place] [employer] fair. She said \"one for you and one for a friend that you could take with you\"     3.Health  Elizabeth agreed that following up with ones primary physician regularly and especially after visits to the ED is very important. She said she is thankful for her kidney donor and her doctor. She celebrates two birthdays one her original birthday and one the date of kidney transplant, coming up three years.    Assessment: Elizabeth arrived on time to the group. She continues to drift to the negative side of situations. However, she is open to feedback (and " seemed increasingly more at ease) from group on their perspective on how to make the most out of her situations. She offered to try that as her homework this weekend. She demonstrated altruism and decreased self absorption as she reached out to offer suggestions to a peer about her isolation.    Plan: Continue weekly group therapy to work on goals. See treatment plan.         Yosef Pacheco MD   Psychiatry Resident PGY3  I was Not present for the group therapy session. I did review the entire session on video for training purposes.  my observations of Elizabeth Palma s progress and participation are Elizabeth appears to be accepting feedback from group members and acknowledging that she needs to learn to control her emotions.     Era Gonzalez, PhD. LP

## 2017-08-30 DIAGNOSIS — Z94.0 KIDNEY REPLACED BY TRANSPLANT: ICD-10-CM

## 2017-08-30 RX ORDER — CYCLOSPORINE 25 MG/1
100 CAPSULE, LIQUID FILLED ORAL 2 TIMES DAILY
Qty: 300 CAPSULE | Refills: 6 | Status: SHIPPED | OUTPATIENT
Start: 2017-08-30 | End: 2017-09-08
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
## 2017-09-01 ENCOUNTER — TELEPHONE (OUTPATIENT)
Dept: TRANSPLANT | Facility: CLINIC | Age: 62
End: 2017-09-01

## 2017-09-01 NOTE — TELEPHONE ENCOUNTER
[facility] Prior Authorization Team   Phone: [phone]  Fax: [phone]    PA Initiation    Medication: cycloSPORINE 25 MG capsule   Insurance Company: [employer] - Phone [phone] Fax [phone]  Pharmacy Filling the Rx: [facility] - [place], [place] - [address]  Filling Pharmacy Phone: [phone]  Filling Pharmacy Fax: [phone]  Start Date: 9/1/2017

## 2017-09-05 ENCOUNTER — OFFICE VISIT (OUTPATIENT)
Dept: PSYCHIATRY | Facility: CLINIC | Age: 62
End: 2017-09-05
Attending: PSYCHOLOGIST
Payer: MEDICARE

## 2017-09-05 DIAGNOSIS — F43.10 POSTTRAUMATIC STRESS DISORDER: ICD-10-CM

## 2017-09-05 DIAGNOSIS — F41.1 GENERALIZED ANXIETY DISORDER: ICD-10-CM

## 2017-09-05 DIAGNOSIS — F33.1 MAJOR DEPRESSIVE DISORDER, RECURRENT EPISODE, MODERATE (H): Primary | ICD-10-CM

## 2017-09-05 NOTE — MR AVS SNAPSHOT
After Visit Summary   9/5/2017    Elizabeth Palma    MRN: [id number]           Patient Information     Date Of Birth          1955        Visit Information        Provider Department      9/5/2017 10:30 AM Era Gonzalez, PhD  [facility]iatry [facility]        Today's Diagnoses     Major depressive disorder, recurrent episode, moderate (H)    -  1    Posttraumatic stress disorder        Generalized anxiety disorder           Follow-ups after your visit        Your next 10 appointments already scheduled     Sep 25, 2017  9:00 AM CDT   (Arrive by 8:45 AM)   Return Visit with Horacio Sheridan MD   [facility] ([facility])    [address] [place] [address]  [address]th [facility] [id number] [phone]            Oct 10, 2017  1:30 PM CDT   Adult Med Follow UP with Chaparrita Hatch MD   [facility] ([facility])    [address]  [facility] [address] [phone]            Oct 13, 2017  9:00 AM CDT   LAB with  LAB   [facility] ([facility])    [address] [place] [address]  [address]st [facility] [id number] [phone]           Patient must bring picture ID. Patient should be prepared to give a urine specimen  Please do not eat 10-12 hours before your appointment if you are coming in fasting for labs on lipids, cholesterol, or glucose (sugar). Pregnant women should follow their Care Team instructions. Water with medications is okay. Do not drink coffee or other fluids. If you have concerns about taking  your medications, please ask at office or if scheduling via [facility], send a message by clicking on Secure Messaging, Message Your Care Team.            Oct 13, 2017  9:30 AM CDT   NUTRITION VISIT with Francesca Danielle RD   [facility] ([facility])    [address] [place] [address]  [address]th [facility] [id number] [phone]            Nov 17,  2017  9:00 AM CST   LAB with  LAB    [facility] ([facility])    [address] [place] [address]   [phone]           Patient must bring picture ID. Patient should be prepared to give a urine specimen  Please do not eat 10-12 hours before your appointment if you are coming in fasting for labs on lipids, cholesterol, or glucose (sugar). Pregnant women should follow their Care Team instructions. Water with medications is okay. Do not drink coffee or other fluids. If you have concerns about taking  your medications, please ask at office or if scheduling via [employer], send a message by clicking on Secure Messaging, Message Your Care Team.            Nov 17, 2017  9:30 AM CST   NUTRITION VISIT with Francesca Danielle RD   [facility] ([facility])    [address] [place] [address]   [phone]            Nov 20, 2017 10:45 AM CST   (Arrive by 10:30 AM)   Return Visit with Prakash Irene MD   [facility] ([facility])    [address] [place] [address]   [phone]            Nov 20, 2017  1:00 PM CST   Return Visit with Javier Tuttle DPM   [facility] ([facility])    [address] [place] [address]   [phone]            Dec 22, 2017  9:00 AM CST   LAB with  LAB   [facility] ([facility])    [address] [place] [address]   [phone]           Patient must bring picture ID. Patient should be prepared to give a urine specimen  Please do not eat 10-12 hours before your appointment if you are coming in fasting for labs on lipids, cholesterol, or glucose (sugar). Pregnant women should follow their Care Team instructions. Water with medications is okay. Do not drink coffee or other fluids.  If you have concerns about taking  your medications, please ask at office or if scheduling via [employer], send a message by clicking on Secure Messaging, Message Your Care Team.            Dec 22, 2017  9:30 AM CST   NUTRITION VISIT with Francesca Danielle RD   [facility] ([facility])    [address]  4th [facility] [id number] [phone]              Who to contact     Please call your clinic at [phone] to:    Ask questions about your health    Make or cancel appointments    Discuss your medicines    Learn about your test results    Speak to your doctor   If you have compliments or concerns about an experience at your clinic, or if you wish to file a complaint, please contact [facility] Patient Relations at [phone] or email us at [email or web address]         Additional Information About Your Visit        [employer] Information     [employer] gives you secure access to your electronic health record. If you see a primary care provider, you can also send messages to your care team and make appointments. If you have questions, please call your primary care clinic.  If you do not have a primary care provider, please call [phone] and they will assist you.      [employer] is an electronic gateway that provides easy, online access to your medical records. With [employer], you can request a clinic appointment, read your test results, renew a prescription or communicate with your care team.     To access your existing account, please contact your [facility] or call [phone] for assistance.        Care EveryWhere ID     This is your Care EveryWhere ID. This could be used by other organizations to access your [place] medical records  [id number]         Blood Pressure from Last 3 Encounters:   09/12/17 122/72   08/21/17 102/70   08/17/17 110/75    Weight from Last 3  Encounters:   09/15/17 76.5 kg (168 lb 9.6 oz)   09/12/17 77.2 kg (170 lb 3.2 oz)   08/17/17 77.8 kg (171 lb 9.6 oz)              Today, you had the following     No orders found for display         Today's Medication Changes          These changes are accurate as of: 9/5/17 11:59 PM.  If you have any questions, ask your nurse or doctor.               These medicines have changed or have updated prescriptions.        Dose/Directions    cyanocobalamin 1000 MCG tablet   Commonly known as:  vitamin  B-12   This may have changed:  when to take this   Used for:  CKD (chronic kidney disease) stage 5, GFR less than 15 ml/min (H)        Dose:  1000 mcg   Take 1 tablet by mouth daily.   Quantity:  30 tablet   Refills:  0       econazole nitrate 1 % cream   This may have changed:    - when to take this  - reasons to take this   Used for:  DM neuro manif type II, Dermatophytosis of foot        Apply topically daily   Quantity:  85 g   Refills:  3                Primary Care Provider Office Phone # Fax #    Horacio Sheridan [phone]       [address] [place] [address]  [facility] [address]        Equal Access to Services     [facility] AH: Hadii riky Quevedo, wareedda lubrit, qaybta kaalmada adeclaribelda, ty fam. So [facility] [phone].    ATENCIÓN: Si habla español, tiene a goetz disposición servicios gratuitos de asistencia lingüística. Llame al [phone].    We comply with applicable federal civil rights laws and [place] laws. We do not discriminate on the basis of race, color, national origin, age, disability sex, sexual orientation or gender identity.            Thank you!     Thank you for choosing PS[facility]ATRY CLINIC  for your care. Our goal is always to provide you with excellent care. Hearing back from our patients is one way we can continue to improve our services. Please take a few minutes to complete the written survey that you may receive in the mail after your  visit with us. Thank you!             Your Updated Medication List - Protect others around you: Learn how to safely use, store and throw away your medicines at www.disposemymeds.org.          This list is accurate as of: 9/5/17 11:59 PM.  Always use your most recent med list.                   Brand Name Dispense Instructions for use Diagnosis    acetylcysteine 600 MG Caps capsule    N-ACETYL CYSTEINE    30 capsule    Take 2 400 mg caps two times daily for total daily dose of 800 mg        albuterol 108 (90 BASE) MCG/ACT Inhaler    PROAIR HFA/PROVENTIL HFA/VENTOLIN HFA    1 Inhaler    Inhale 2 puffs into the lungs every 6 hours as needed for shortness of breath / dyspnea or wheezing    Exercise-induced asthma       ARIPiprazole 2 MG tablet    ABILIFY    15 tablet    Take 0.5 tablets (1 mg) by mouth daily    Major depressive disorder, recurrent episode, moderate (H)       aspirin 81 MG EC tablet     30 tablet    Take 1 tablet (81 mg) by mouth daily    Kidney replaced by transplant       BENADRYL 25 MG capsule   Generic drug:  diphenhydrAMINE      Take 25 mg by mouth At Bedtime.        blood glucose monitoring lancets     1 Box    Use to test blood sugar 2 times daily or as directed.    Type 2 diabetes mellitus with peripheral neuropathy (H)       * blood glucose monitoring meter device kit    no brand specified    1 kit    Use to test blood sugar 2 times daily or as directed.    Type 2 diabetes mellitus with peripheral neuropathy (H)       * blood glucose monitoring meter device kit           blood glucose monitoring test strip    no brand specified    100 strip    Use to test blood sugars 2 times daily or as directed    Type 2 diabetes mellitus with peripheral neuropathy (H)       cyanocobalamin 1000 MCG tablet    vitamin  B-12    30 tablet    Take 1 tablet by mouth daily.    CKD (chronic kidney disease) stage 5, GFR less than 15 ml/min (H)       econazole nitrate 1 % cream     85 g    Apply topically daily    DM  neuro manif type II, Dermatophytosis of foot       furosemide 20 MG tablet    LASIX    90 tablet    Take 1 tablet (20 mg) by mouth daily    Generalized edema       latanoprost 0.005 % ophthalmic solution    XALATAN    2.5 mL    Place 1 drop into both eyes At Bedtime    Mild stage glaucoma       metFORMIN 500 MG 24 hr tablet    GLUCOPHAGE-XR    90 tablet    Take 3 tablets (1,500 mg) by mouth daily (with dinner)    Type 2 diabetes mellitus with diabetic polyneuropathy, without long-term current use of insulin (H)       mycophenolate 250 MG capsule    GENERIC EQUIVALENT    240 capsule    Take 4 capsules (1,000 mg) by mouth 2 times daily    Kidney transplanted       omeprazole 40 MG capsule    priLOSEC    90 capsule    Take 1 capsule (40 mg) by mouth daily    Gastroesophageal reflux disease without esophagitis       ondansetron 4 MG ODT tab    ZOFRAN-ODT    20 tablet    Take 1 tablet (4 mg) by mouth every 6 hours as needed    Chronic kidney disease, stage V (H)       order for DME     1 Units    Walker with front wheels and a seat.    Fall, initial encounter       prazosin 5 MG capsule    MINIPRESS    90 capsule    Take 3 capsules (15 mg) by mouth At Bedtime    Nightmares associated with chronic post-traumatic stress disorder       REFRESH OP      Apply to eye as needed Both eyes        replens Gel     35 g    Use vaginally as needed. Can use up to 3 times per week.    Vaginal dryness       simvastatin 20 MG tablet    ZOCOR    90 tablet    Take 1 tablet (20 mg) by mouth At Bedtime    Chronic kidney disease, stage V (H)       sulfamethoxazole-trimethoprim 400-80 MG per tablet    BACTRIM/SEPTRA    90 tablet    Take 1 tablet by mouth daily    Immunosuppression (H)       topiramate 200 MG tablet    TOPAMAX    60 tablet    Take 1 tablet (200 mg) by mouth 2 times daily    Morbid obesity due to excess calories (H)       TYLENOL 325 MG tablet   Generic drug:  acetaminophen      Take 325-650 mg by mouth as needed         vilazodone 40 MG Tabs tablet    VIIBRYD    30 tablet    Take 1 tablet (40 mg) by mouth daily    Major depressive disorder, recurrent episode, moderate (H)       vitamin D 1000 UNITS capsule     30 capsule    2,000 Units        * Notice:  This list has 2 medication(s) that are the same as other medications prescribed for you. Read the directions carefully, and ask your doctor or other care provider to review them with you.

## 2017-09-07 DIAGNOSIS — Z94.0 KIDNEY REPLACED BY TRANSPLANT: ICD-10-CM

## 2017-09-07 NOTE — TELEPHONE ENCOUNTER
Received a call from Pharmacy requesting clarification about patients Cyclosporin dose.  We had 4 caps Bid for a total of 100 mg bid.  Patient stated Dr. Jimenez sent her a message increase the CSA dose to 125 mg Bid.    Please place a script for the increase in CSA dosing from 100 mg bid to 125 mg Bid  Repeat labs in 1-2 weeks.

## 2017-09-07 NOTE — TELEPHONE ENCOUNTER
[facility] Prior Authorization Team   Phone: [phone]  Fax: [phone]    Prior Authorization Not Needed per Insurance    Medication: cycloSPORINE 25 MG capsule   Insurance Company: [employer] - Phone [phone] Fax [phone]  Expected CoPay:      Pharmacy Filling the Rx: [facility] - [place], [place] - [facility] [address]  Pharmacy Notified: Yes  Patient Notified: Yes

## 2017-09-08 DIAGNOSIS — Z94.0 KIDNEY REPLACED BY TRANSPLANT: Primary | ICD-10-CM

## 2017-09-08 RX ORDER — CYCLOSPORINE 25 MG/1
125 CAPSULE, LIQUID FILLED ORAL 2 TIMES DAILY
Qty: 300 CAPSULE | Refills: 6 | Status: SHIPPED | OUTPATIENT
Start: 2017-09-08 | End: 2021-04-19

## 2017-09-12 ENCOUNTER — OFFICE VISIT (OUTPATIENT)
Dept: PSYCHIATRY | Facility: CLINIC | Age: 62
End: 2017-09-12

## 2017-09-12 ENCOUNTER — OFFICE VISIT (OUTPATIENT)
Dept: PSYCHIATRY | Facility: CLINIC | Age: 62
End: 2017-09-12
Attending: PSYCHOLOGIST
Payer: MEDICARE

## 2017-09-12 VITALS
WEIGHT: 170.2 LBS | SYSTOLIC BLOOD PRESSURE: 122 MMHG | DIASTOLIC BLOOD PRESSURE: 72 MMHG | BODY MASS INDEX: 33.41 KG/M2 | HEIGHT: 60 IN

## 2017-09-12 DIAGNOSIS — F41.9 ANXIETY: ICD-10-CM

## 2017-09-12 DIAGNOSIS — F51.5 NIGHTMARES ASSOCIATED WITH CHRONIC POST-TRAUMATIC STRESS DISORDER: ICD-10-CM

## 2017-09-12 DIAGNOSIS — F43.12 NIGHTMARES ASSOCIATED WITH CHRONIC POST-TRAUMATIC STRESS DISORDER: ICD-10-CM

## 2017-09-12 DIAGNOSIS — F43.10 POSTTRAUMATIC STRESS DISORDER: Primary | ICD-10-CM

## 2017-09-12 DIAGNOSIS — F33.1 MAJOR DEPRESSIVE DISORDER, RECURRENT EPISODE, MODERATE (H): ICD-10-CM

## 2017-09-12 DIAGNOSIS — F43.10 POSTTRAUMATIC STRESS DISORDER: ICD-10-CM

## 2017-09-12 DIAGNOSIS — F33.1 MAJOR DEPRESSIVE DISORDER, RECURRENT EPISODE, MODERATE (H): Primary | ICD-10-CM

## 2017-09-12 ASSESSMENT — PATIENT HEALTH QUESTIONNAIRE - PHQ9: SUM OF ALL RESPONSES TO PHQ QUESTIONS 1-9: 10

## 2017-09-12 NOTE — MR AVS SNAPSHOT
After Visit Summary   9/12/2017    Elizabeth Palma    MRN: [id number]           Patient Information     Date Of Birth          1955        Visit Information        Provider Department      9/12/2017 1:00 PM Chaparrita Hatch MD [facility]        Today's Diagnoses     Major depressive disorder, recurrent episode, moderate (H)    -  1    Posttraumatic stress disorder        Anxiety        Nightmares associated with chronic post-traumatic stress disorder           Follow-ups after your visit        Follow-up notes from your care team     Return in about 4 weeks (around 10/10/2017) for 30 min. MFU.      Your next 10 appointments already scheduled     Sep 15, 2017  9:00 AM CDT   Lab with  LAB    [facility] ([facility])    [address]  [address] [facility] [id number] [phone]            Sep 15, 2017  9:30 AM CDT   NUTRITION VISIT with Francesca Danielle RD   [facility] ([facility])    [address] [place] [address]  [address] [facility] [id number] [phone]            Sep 25, 2017  9:00 AM CDT   (Arrive by 8:45 AM)   Return Visit with Horacio Sheridan MD   [facility] ([facility])    [address]  [address] [facility] [id number] [phone]            Oct 10, 2017  1:30 PM CDT   Adult Med Follow UP with Chaparrita Hatch MD   [facility] ([facility])    [address]  [facility] [address]   [phone]            Oct 13, 2017  9:00 AM CDT   LAB with [employer] [facility]    [facility] ([facility])    [address]  [address] [facility] [id number] [phone]           Patient must bring picture ID. Patient should be prepared to give a urine specimen  Please do not eat 10-12 hours before your appointment if you are coming in fasting for labs on lipids, cholesterol, or  glucose (sugar). Pregnant women should follow their Care Team instructions. Water with medications is okay. Do not drink coffee or other fluids. If you have concerns about taking  your medications, please ask at office or if scheduling via Peak, send a message by clicking on Secure Messaging, Message Your Care Team.            Oct 13, 2017  9:30 AM CDT   NUTRITION VISIT with SUJATA [facility] ([facility])    [address] [place] [address]   [phone]            Nov 17, 2017  9:00 AM CST   LAB with  LAB   [facility] ([facility])    [address] [place] [id number] [phone]           Patient must bring picture ID. Patient should be prepared to give a urine specimen  Please do not eat 10-12 hours before your appointment if you are coming in fasting for labs on lipids, cholesterol, or glucose (sugar). Pregnant women should follow their Care Team instructions. Water with medications is okay. Do not drink coffee or other fluids. If you have concerns about taking  your medications, please ask at office or if scheduling via Peak, send a message by clicking on Secure Messaging, Message Your Care Team.            Nov 17, 2017  9:30 AM CST   NUTRITION VISIT with SUJATA [facility] ([facility])    [address] [place] [address]   [phone]            Nov 20, 2017 10:45 AM CST   (Arrive by 10:30 AM)   Return Visit with Prakash Irene MD   [facility] ([facility])    [address] [place] [address]   [phone]            Nov 20, 2017  1:00 PM CST   Return Visit with Javier Tuttle DPM   [facility]    [id number] [place] [address]  [place] [address] [phone]              Who to contact     Please call your clinic at [phone] to:    Ask questions about your health    Make or cancel appointments    Discuss your medicines    Learn about your test results    Speak to your doctor   If you have compliments or concerns about an experience at your clinic, or if you wish to file a complaint, please contact [facility] Patient Relations at [phone] or email us at [email or web address]         Additional Information About Your Visit        [employer] Information     [employer] gives you secure access to your electronic health record. If you see a primary care provider, you can also send messages to your care team and make appointments. If you have questions, please call your primary care clinic.  If you do not have a primary care provider, please call [phone] and they will assist you.      Hotalot is an electronic gateway that provides easy, online access to your medical records. With [employer], you can request a clinic appointment, read your test results, renew a prescription or communicate with your care team.     To access your existing account, please contact your [facility] or call [phone] for assistance.        Care EveryWhere ID     This is your Care EveryWhere ID. This could be used by other organizations to access your [facility] medical records  [id number]        Your Vitals Were     Height Breastfeeding? BMI (Body Mass Index)             5' (152.4 cm) No 33.24 kg/m2          Blood Pressure from Last 3 Encounters:   09/12/17 122/72   08/21/17 102/70   08/17/17 110/75    Weight from Last 3 Encounters:   09/12/17 170 lb 3.2 oz (77.2 kg)   08/17/17 171 lb 9.6 oz (77.8 kg)   08/14/17 168 lb 14.4 oz (76.6 kg)              Today, you had the following     No orders found for display         Today's Medication Changes          These changes are accurate as of: 9/12/17   1:30 PM.  If you have any questions, ask your nurse or doctor.               These medicines have changed or have updated prescriptions.        Dose/Directions    cyanocobalamin 1000 MCG tablet   Commonly known as:  vitamin  B-12   This may have changed:  when to take this   Used for:  CKD (chronic kidney disease) stage 5, GFR less than 15 ml/min (H)        Dose:  1000 mcg   Take 1 tablet by mouth daily.   Quantity:  30 tablet   Refills:  0       econazole nitrate 1 % cream   This may have changed:    - when to take this  - reasons to take this   Used for:  DM neuro manif type II, Dermatophytosis of foot        Apply topically daily   Quantity:  85 g   Refills:  3                Primary Care Provider Office Phone # Fax #    Horacio Sheridan [phone]       [address] [place] [address]        Equal Access to Services     [facility] : Jennifer richter Somartín, waaxda luqadaha, qaybta kaalmada lor, ty afm. So [facility] [phone].    ATENCIÓN: Si habla español, tiene a goetz disposición servicios gratuitos de asistencia lingüística. Rachel al [phone].    We comply with applicable federal civil rights laws and [place] laws. We do not discriminate on the basis of race, color, national origin, age, disability sex, sexual orientation or gender identity.            Thank you!     Thank you for choosing [facility]  for your care. Our goal is always to provide you with excellent care. Hearing back from our patients is one way we can continue to improve our services. Please take a few minutes to complete the written survey that you may receive in the mail after your visit with us. Thank you!             Your Updated Medication List - Protect others around you: Learn how to safely use, store and throw away your medicines at www.disposemymeds.org.          This list is accurate as of: 9/12/17  1:30 PM.  Always use your most recent med list.                    Brand Name Dispense Instructions for use Diagnosis    acetylcysteine 600 MG Caps capsule    N-ACETYL CYSTEINE    30 capsule    Take 2 400 mg caps two times daily for total daily dose of 800 mg        albuterol 108 (90 BASE) MCG/ACT Inhaler    PROAIR HFA/PROVENTIL HFA/VENTOLIN HFA    1 Inhaler    Inhale 2 puffs into the lungs every 6 hours as needed for shortness of breath / dyspnea or wheezing    Exercise-induced asthma       ARIPiprazole 2 MG tablet    ABILIFY    15 tablet    Take 0.5 tablets (1 mg) by mouth daily    Major depressive disorder, recurrent episode, moderate (H)       aspirin 81 MG EC tablet     30 tablet    Take 1 tablet (81 mg) by mouth daily    Kidney replaced by transplant       BENADRYL 25 MG capsule   Generic drug:  diphenhydrAMINE      Take 25 mg by mouth At Bedtime.        blood glucose monitoring lancets     1 Box    Use to test blood sugar 2 times daily or as directed.    Type 2 diabetes mellitus with peripheral neuropathy (H)       * blood glucose monitoring meter device kit    no brand specified    1 kit    Use to test blood sugar 2 times daily or as directed.    Type 2 diabetes mellitus with peripheral neuropathy (H)       * blood glucose monitoring meter device kit           blood glucose monitoring test strip    no brand specified    100 strip    Use to test blood sugars 2 times daily or as directed    Type 2 diabetes mellitus with peripheral neuropathy (H)       cyanocobalamin 1000 MCG tablet    vitamin  B-12    30 tablet    Take 1 tablet by mouth daily.    CKD (chronic kidney disease) stage 5, GFR less than 15 ml/min (H)       cycloSPORINE modified 25 MG capsule    GENERIC EQUIVALENT    300 capsule    Take 5 capsules (125 mg) by mouth 2 times daily    Kidney replaced by transplant       econazole nitrate 1 % cream     85 g    Apply topically daily    DM neuro manif type II, Dermatophytosis of foot       furosemide 20 MG tablet    LASIX    90 tablet    Take 1 tablet (20  mg) by mouth daily    Generalized edema       latanoprost 0.005 % ophthalmic solution    XALATAN    2.5 mL    Place 1 drop into both eyes At Bedtime    Mild stage glaucoma       metFORMIN 500 MG 24 hr tablet    GLUCOPHAGE-XR    90 tablet    Take 3 tablets (1,500 mg) by mouth daily (with dinner)    Type 2 diabetes mellitus with diabetic polyneuropathy, without long-term current use of insulin (H)       mycophenolate 250 MG capsule    GENERIC EQUIVALENT    240 capsule    Take 4 capsules (1,000 mg) by mouth 2 times daily    Kidney transplanted       omeprazole 40 MG capsule    priLOSEC    90 capsule    Take 1 capsule (40 mg) by mouth daily    Gastroesophageal reflux disease without esophagitis       ondansetron 4 MG ODT tab    ZOFRAN-ODT    20 tablet    Take 1 tablet (4 mg) by mouth every 6 hours as needed    Chronic kidney disease, stage V (H)       order for DME     1 Units    Walker with front wheels and a seat.    Fall, initial encounter       prazosin 5 MG capsule    MINIPRESS    90 capsule    Take 3 capsules (15 mg) by mouth At Bedtime    Nightmares associated with chronic post-traumatic stress disorder       REFRESH OP      Apply to eye as needed Both eyes        replens Gel     35 g    Use vaginally as needed. Can use up to 3 times per week.    Vaginal dryness       simvastatin 20 MG tablet    ZOCOR    90 tablet    Take 1 tablet (20 mg) by mouth At Bedtime    Chronic kidney disease, stage V (H)       sulfamethoxazole-trimethoprim 400-80 MG per tablet    BACTRIM/SEPTRA    90 tablet    Take 1 tablet by mouth daily    Immunosuppression (H)       topiramate 200 MG tablet    TOPAMAX    60 tablet    Take 1 tablet (200 mg) by mouth 2 times daily    Morbid obesity due to excess calories (H)       TYLENOL 325 MG tablet   Generic drug:  acetaminophen      Take 325-650 mg by mouth as needed        vilazodone 40 MG Tabs tablet    VIIBRYD    30 tablet    Take 1 tablet (40 mg) by mouth daily    Major depressive disorder,  recurrent episode, moderate (H)       vitamin D 1000 UNITS capsule     30 capsule    2,000 Units        * Notice:  This list has 2 medication(s) that are the same as other medications prescribed for you. Read the directions carefully, and ask your doctor or other care provider to review them with you.
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
## 2017-09-12 NOTE — MR AVS SNAPSHOT
After Visit Summary   9/12/2017    Elizabeth Palma    MRN: [id number]           Patient Information     Date Of Birth          1955        Visit Information        Provider Department      9/12/2017 10:30 AM Era Gonzalez, PhD  Psychiatry Clinic        Today's Diagnoses     Posttraumatic stress disorder    -  1    Anxiety        Major depressive disorder, recurrent episode, moderate (H)           Follow-ups after your visit        Your next 10 appointments already scheduled     Sep 25, 2017  9:00 AM CDT   (Arrive by 8:45 AM)   Return Visit with Horacio Sheridan MD   [facility] ([facility])    [address] [place] [address]  [address] [facility] [id number] [phone]            Oct 10, 2017  1:30 PM CDT   Adult Med Follow UP with Chaparrita Hatch MD   [facility] ([facility])    [address]  [facility] [id number] [phone]            Oct 13, 2017  9:00 AM CDT   LAB with  LAB   [facility] ([facility])    [address] [place] [address]  [address] [facility] [id number] [phone]           Patient must bring picture ID. Patient should be prepared to give a urine specimen  Please do not eat 10-12 hours before your appointment if you are coming in fasting for labs on lipids, cholesterol, or glucose (sugar). Pregnant women should follow their Care Team instructions. Water with medications is okay. Do not drink coffee or other fluids. If you have concerns about taking  your medications, please ask at office or if scheduling via CCTV Wireless[employer], send a message by clicking on Secure Messaging, Message Your Care Team.            Oct 13, 2017  9:30 AM CDT   NUTRITION VISIT with Francesca Danielle RD   [facility] ([facility])    [address] [place] [address]  [address] [facility] [id number] [phone]            Nov 17, 2017  9:00 AM CST    LAB with  LAB    [facility] ([facility])    [address] [place] [address]   [phone]           Patient must bring picture ID. Patient should be prepared to give a urine specimen  Please do not eat 10-12 hours before your appointment if you are coming in fasting for labs on lipids, cholesterol, or glucose (sugar). Pregnant women should follow their Care Team instructions. Water with medications is okay. Do not drink coffee or other fluids. If you have concerns about taking  your medications, please ask at office or if scheduling via EV[employer]VO, send a message by clicking on Secure Messaging, Message Your Care Team.            Nov 17, 2017  9:30 AM CST   NUTRITION VISIT with Francesca Danielle RD   [facility] ([facility])    [address] [place] [address]   [phone]            Nov 20, 2017 10:45 AM CST   (Arrive by 10:30 AM)   Return Visit with Prakash Irene MD   [facility] ([facility])    [address] [place] [address]   [phone]            Nov 20, 2017  1:00 PM CST   Return Visit with Javier Tuttle DPM   [facility] ([facility])    [address] [place] [address]   [phone]            Dec 22, 2017  9:00 AM CST   LAB with  LAB   [facility] ([facility])    [address] [place] [address] [phone]           Patient must bring picture ID. Patient should be prepared to give a urine specimen  Please do not eat 10-12 hours before your appointment if you are coming in fasting for labs on lipids, cholesterol, or glucose (sugar). Pregnant women should follow their Care Team instructions. Water with medications is okay. Do not drink coffee or other fluids. If you have  concerns about taking  your medications, please ask at office or if scheduling via Stringbike, send a message by clicking on Secure Messaging, Message Your Care Team.            Dec 22, 2017  9:30 AM CST   NUTRITION VISIT with Francesca Danielle RD   [facility] ([facility])    [address] [address] [phone]              Who to contact     Please call your clinic at [phone] to:    Ask questions about your health    Make or cancel appointments    Discuss your medicines    Learn about your test results    Speak to your doctor   If you have compliments or concerns about an experience at your clinic, or if you wish to file a complaint, please contact [facility] Patient Relations at [phone] or email us at [email or web address]         Additional Information About Your Visit        Stringbike Information     Stringbike gives you secure access to your electronic health record. If you see a primary care provider, you can also send messages to your care team and make appointments. If you have questions, please call your primary care clinic.  If you do not have a primary care provider, please call [phone] and they will assist you.      Stringbike is an electronic gateway that provides easy, online access to your medical records. With Stringbike, you can request a clinic appointment, read your test results, renew a prescription or communicate with your care team.     To access your existing account, please contact your [facility] or call [phone] for assistance.        Care EveryWhere ID     This is your Care EveryWhere ID. This could be used by other organizations to access your [place] medical records  [id number]         Blood Pressure from Last 3 Encounters:   09/12/17 122/72   08/21/17 102/70   08/17/17 110/75    Weight from Last 3 Encounters:   09/15/17  76.5 kg (168 lb 9.6 oz)   09/12/17 77.2 kg (170 lb 3.2 oz)   08/17/17 77.8 kg (171 lb 9.6 oz)              Today, you had the following     No orders found for display         Today's Medication Changes          These changes are accurate as of: 9/12/17 11:59 PM.  If you have any questions, ask your nurse or doctor.               These medicines have changed or have updated prescriptions.        Dose/Directions    cyanocobalamin 1000 MCG tablet   Commonly known as:  vitamin  B-12   This may have changed:  when to take this   Used for:  CKD (chronic kidney disease) stage 5, GFR less than 15 ml/min (H)        Dose:  1000 mcg   Take 1 tablet by mouth daily.   Quantity:  30 tablet   Refills:  0       econazole nitrate 1 % cream   This may have changed:    - when to take this  - reasons to take this   Used for:  DM neuro manif type II, Dermatophytosis of foot        Apply topically daily   Quantity:  85 g   Refills:  3                Primary Care Provider Office Phone # Fax #    Horacio Sheridan [phone]       [address] [place] [address]        Equal Access to Services     [facility] AH: Hadii riky richter Somartín, wareedda ash, qaybta kaalmajuly horowitz, ty agrawal . So [facility] [phone].    ATENCIÓN: Si habla español, tiene a goetz disposición servicios gratuitos de asistencia lingüística. [facility] [phone].    We comply with applicable federal civil rights laws and [place] laws. We do not discriminate on the basis of race, color, national origin, age, disability sex, sexual orientation or gender identity.            Thank you!     Thank you for choosing PSYCHIATRY CLINIC  for your care. Our goal is always to provide you with excellent care. Hearing back from our patients is one way we can continue to improve our services. Please take a few minutes to complete the written survey that you may receive in the mail after your visit with us. Thank  you!             Your Updated Medication List - Protect others around you: Learn how to safely use, store and throw away your medicines at www.disposemymeds.org.          This list is accurate as of: 9/12/17 11:59 PM.  Always use your most recent med list.                   Brand Name Dispense Instructions for use Diagnosis    acetylcysteine 600 MG Caps capsule    N-ACETYL CYSTEINE    30 capsule    Take 2 400 mg caps two times daily for total daily dose of 800 mg        albuterol 108 (90 BASE) MCG/ACT Inhaler    PROAIR HFA/PROVENTIL HFA/VENTOLIN HFA    1 Inhaler    Inhale 2 puffs into the lungs every 6 hours as needed for shortness of breath / dyspnea or wheezing    Exercise-induced asthma       ARIPiprazole 2 MG tablet    ABILIFY    15 tablet    Take 0.5 tablets (1 mg) by mouth daily    Major depressive disorder, recurrent episode, moderate (H)       aspirin 81 MG EC tablet     30 tablet    Take 1 tablet (81 mg) by mouth daily    Kidney replaced by transplant       BENADRYL 25 MG capsule   Generic drug:  diphenhydrAMINE      Take 25 mg by mouth At Bedtime.        blood glucose monitoring lancets     1 Box    Use to test blood sugar 2 times daily or as directed.    Type 2 diabetes mellitus with peripheral neuropathy (H)       * blood glucose monitoring meter device kit    no brand specified    1 kit    Use to test blood sugar 2 times daily or as directed.    Type 2 diabetes mellitus with peripheral neuropathy (H)       * blood glucose monitoring meter device kit           blood glucose monitoring test strip    no brand specified    100 strip    Use to test blood sugars 2 times daily or as directed    Type 2 diabetes mellitus with peripheral neuropathy (H)       cyanocobalamin 1000 MCG tablet    vitamin  B-12    30 tablet    Take 1 tablet by mouth daily.    CKD (chronic kidney disease) stage 5, GFR less than 15 ml/min (H)       cycloSPORINE modified 25 MG capsule    GENERIC EQUIVALENT    300 capsule    Take 5  capsules (125 mg) by mouth 2 times daily    Kidney replaced by transplant       econazole nitrate 1 % cream     85 g    Apply topically daily    DM neuro manif type II, Dermatophytosis of foot       furosemide 20 MG tablet    LASIX    90 tablet    Take 1 tablet (20 mg) by mouth daily    Generalized edema       latanoprost 0.005 % ophthalmic solution    XALATAN    2.5 mL    Place 1 drop into both eyes At Bedtime    Mild stage glaucoma       metFORMIN 500 MG 24 hr tablet    GLUCOPHAGE-XR    90 tablet    Take 3 tablets (1,500 mg) by mouth daily (with dinner)    Type 2 diabetes mellitus with diabetic polyneuropathy, without long-term current use of insulin (H)       mycophenolate 250 MG capsule    GENERIC EQUIVALENT    240 capsule    Take 4 capsules (1,000 mg) by mouth 2 times daily    Kidney transplanted       omeprazole 40 MG capsule    priLOSEC    90 capsule    Take 1 capsule (40 mg) by mouth daily    Gastroesophageal reflux disease without esophagitis       ondansetron 4 MG ODT tab    ZOFRAN-ODT    20 tablet    Take 1 tablet (4 mg) by mouth every 6 hours as needed    Chronic kidney disease, stage V (H)       order for DME     1 Units    Walker with front wheels and a seat.    Fall, initial encounter       prazosin 5 MG capsule    MINIPRESS    90 capsule    Take 3 capsules (15 mg) by mouth At Bedtime    Nightmares associated with chronic post-traumatic stress disorder       REFRESH OP      Apply to eye as needed Both eyes        replens Gel     35 g    Use vaginally as needed. Can use up to 3 times per week.    Vaginal dryness       simvastatin 20 MG tablet    ZOCOR    90 tablet    Take 1 tablet (20 mg) by mouth At Bedtime    Chronic kidney disease, stage V (H)       sulfamethoxazole-trimethoprim 400-80 MG per tablet    BACTRIM/SEPTRA    90 tablet    Take 1 tablet by mouth daily    Immunosuppression (H)       topiramate 200 MG tablet    TOPAMAX    60 tablet    Take 1 tablet (200 mg) by mouth 2 times daily    Morbid  obesity due to excess calories (H)       TYLENOL 325 MG tablet   Generic drug:  acetaminophen      Take 325-650 mg by mouth as needed        vilazodone 40 MG Tabs tablet    VIIBRYD    30 tablet    Take 1 tablet (40 mg) by mouth daily    Major depressive disorder, recurrent episode, moderate (H)       vitamin D 1000 UNITS capsule     30 capsule    2,000 Units        * Notice:  This list has 2 medication(s) that are the same as other medications prescribed for you. Read the directions carefully, and ask your doctor or other care provider to review them with you.

## 2017-09-13 NOTE — PROGRESS NOTES
"Group Therapy N = 6 present; 70 min  Diagnoses: MDD (F33.1); NELDA(F41,1) PTSD (F43.10)  Co-Facilitators: Murphy Gonzalez, Yosef Pacheco      S/O:  Reviewed Homework and Left Over from last week and set new homework.       Other Topics Discussed:   1. Importance of clear communication  At the beginning of the group a peer shared that she had two left overs from last week. She said she was surprised that the lead therapist was not present for the last two sessions. She wondered why the group was not informed of the absence. Lead therapist validated the frustration and responded that it has been a historic challenge that once given the information, groups may decide to skip that day  altogehter. It becomes a dis-service to the group and the co-therapists. The group said that they would rather know if a therapist is not going to be present for a session. Elizabeth said \"I'd never miss group for that, I will come\"     A peer also said that she felt invalidated as she felt that cotherapist had minimized her leg pain after all that she went through - hospitalization and weeks of antibiotics and pain.  Peer was so upset with the remark that she had to process it with her own therapist. Co-therapist appreciated the candor, apologized for the miscommunication and clarified that she meant it was not a very serious condition like osteomyelitis, although very hurtful. Therapists later discussed the importance of clarifying communication to avoid and fix miscommunications. Elizabeth listened to the discussion.     2. 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 She felt that they were not receiving good care and the staff are being negligent. She was very sad and tearful, a demonstration of catharsis. Group listened and supported, Elizabeth offered support, saying how fortunate those patients are for having " such a good nurse to have cared for them.       3. Socializing  Group discussed places and ways to socialize such as the [employer], library, [employer] etc. A couple of members jokingly brought up having lunch at [employer] together. Another peer reported that she resolved her social conflict with her friends by being selective in invitation to her [employer] group. Melanie said that she socialized last week by going to the [place] [employer] fair with her daughter and other good company.     Assessment: Elizabeth arrived on time to the group. She continues to drift to the negative side of situations. However, she is more and more open to feedback from group on their perspective on how to make the most out of her situations. She is showing marked  development of socializing techniques by learning interpersonal skills and applying it outside. She offered to continue to try it as her homework this week. It will be worthwhile to explore the here and now aspect of Elizabeth's negativism and transference towards  peers who call her out and give her direct advise. She demonstrated universality and support of her peers.    Plan: Continue weekly group therapy to work on goals. See treatment plan.         Yosef Pacheco MD   Psychiatry Resident PGY3  I was present for and actively participated in the entire group therapy session, my observations of Elizabeth Palma s progress and participation are that she seems to be more open to feedback and owning her own behaviors. Limited insight on her impact on others.    Era Gonzalez, PhD, [facility]

## 2017-09-15 ENCOUNTER — ALLIED HEALTH/NURSE VISIT (OUTPATIENT)
Dept: SURGERY | Facility: CLINIC | Age: 62
End: 2017-09-15

## 2017-09-15 VITALS — BODY MASS INDEX: 31.83 KG/M2 | WEIGHT: 168.6 LBS | HEIGHT: 61 IN

## 2017-09-15 DIAGNOSIS — Z48.298 AFTERCARE FOLLOWING ORGAN TRANSPLANT: ICD-10-CM

## 2017-09-15 DIAGNOSIS — Z79.899 ENCOUNTER FOR LONG-TERM CURRENT USE OF MEDICATION: ICD-10-CM

## 2017-09-15 DIAGNOSIS — Z94.0 KIDNEY REPLACED BY TRANSPLANT: ICD-10-CM

## 2017-09-15 LAB
ANION GAP SERPL CALCULATED.3IONS-SCNC: 7 MMOL/L (ref 3–14)
BUN SERPL-MCNC: 14 MG/DL (ref 7–30)
CALCIUM SERPL-MCNC: 9.1 MG/DL (ref 8.5–10.1)
CHLORIDE SERPL-SCNC: 107 MMOL/L (ref 94–109)
CO2 SERPL-SCNC: 24 MMOL/L (ref 20–32)
CREAT SERPL-MCNC: 1.06 MG/DL (ref 0.52–1.04)
CYCLOSPORINE BLD LC/MS/MS-MCNC: 103 UG/L (ref 50–400)
ERYTHROCYTE [DISTWIDTH] IN BLOOD BY AUTOMATED COUNT: 14.3 % (ref 10–15)
GFR SERPL CREATININE-BSD FRML MDRD: 53 ML/MIN/1.7M2
GLUCOSE SERPL-MCNC: 156 MG/DL (ref 70–99)
HCT VFR BLD AUTO: 42.9 % (ref 35–47)
HGB BLD-MCNC: 13.6 G/DL (ref 11.7–15.7)
MCH RBC QN AUTO: 27.1 PG (ref 26.5–33)
MCHC RBC AUTO-ENTMCNC: 31.7 G/DL (ref 31.5–36.5)
MCV RBC AUTO: 86 FL (ref 78–100)
PLATELET # BLD AUTO: 171 10E9/L (ref 150–450)
POTASSIUM SERPL-SCNC: 3.9 MMOL/L (ref 3.4–5.3)
RBC # BLD AUTO: 5.01 10E12/L (ref 3.8–5.2)
SODIUM SERPL-SCNC: 138 MMOL/L (ref 133–144)
TME LAST DOSE: NORMAL H
WBC # BLD AUTO: 4 10E9/L (ref 4–11)

## 2017-09-15 NOTE — PROGRESS NOTES
"Nutrition Reassessment  Reason For Visit:  Elizabeth Palma is a 62 year-old female with type 2 DM (oral med management) presenting today for nutrition follow-up, s/p \"gastric bypass in 1990 at [facility]\" per Pt report.  She is seeing medical weight management.  She was referred by Dr. Irene.  Note: Pt had a kidney transplant on March 20, 2014.    Pt was accompanied by her .     Anthropometrics:  Height: 61\"  Pre-op Weight: 265 lbs per Pt report; lowest weight after bariatric surgery: 165-170 lbs (25 years ago)  (Pt reported that she initially was at 215 lbs in 2015 prior to seeing writer.)    Current Weight: 168.6 lbs (-1 lbs over the past month) with BMI of 32.6.    Current Vitamins/Minerals: 3000 International Units vitamin D/day, Calcium, vitamin C, vitamin B12 daily, B-complex  *Pt stated that she was told not to take other vitamins/minerals by MD.    Nutrition History:  Lactose intolerance ever since bariatric surgery.  Pt stated that she has osteoporosis; however, she stated that her doctors don't want her to take any vitamins or minerals due to her kidney transplant.    Diet/Nutrition Intake Hx: Pt reports her intake varies due to fluctuating appetite. Per pt there are days when she eats 3 meals but on other days she may not eat much at all or nibble on something through out the day. Pt also states her intake is affected by nausea 2/2 her anti-rejection medications. However pt reports she tries to make healthy choices (lower in calorie). Pt is also limited in protein choices that she likes - pt reports she does not take much dairy, does not like beans and lentils, keeps kosher. Advised pt to try to time her meals and eat every 4 hours instead of grazing but pt refused stating she would rather not eat at all.     *Pt stated that she will not record food intake due to the fact that every time she does this, she simply does not eat as she doesn't want to record.  She stated that her therapist strongly " advises against recording food intake for this reason.      Physical Activity Note: Pt reports she has a tendency to break bones so she is limited with what exercises she does. Pt states there is a long hallway in the building that she needs to walk when going out. Pt states she does not walk for exercises but walks only to get ADLs done.    -She plans to get a walker soon.     Progress with Previous Goals:    1. Avoid grazing through, Eat 3 meals with lean protein at each meal, along with a non-starchy vegetable or whole fruit, up to 1/2 c carb at a meal. - Pt has been eating more fish to get in her protein; Pt at least is having 2 meals/day and 1 snack - higher protein items.  Pt is working with her therapist on eating 3 meals/day (limitation is getting upset and not wanting to eat then).    -Try hard-boiled eggs to put on your salad or to have later in the day to make up for skipping breakfast.     2. If needing a snack, have vegetables (give at least 2 hours between a meals and a snack). - Pt has been focusing on vegetables while they are in season.   3. Restart exercise routine with walker (at least 1 time/week walking for 15 min/day slowly increase by 5 minutes a day to a goal of at least 30 minutes or Try a gentle group class) - Pt's new walker has been helpful. She brought it to the fair with her.  She is walking at least 15 min once/week now; plans to increase duration.     Nutrition Prescription:  Grams Protein: 60 (minimum)  Amount of Fluid: 48-64 oz    Nutrition Diagnosis  Previous: Obesity related to excessive energy intake as evidenced by BMI > 30.- continues    Intervention  Intervention At Appointment:  Materials/Education provided:  Praised Pt for weight loss, discussing importance of continuing with the goals.  Discussed challenges (lack of appetite as her son's family along with her 3-year old grandson moved to [place]; getting upset and not wanting to eat - working with therapist on this).   Discussed meal planning and optimizing protein.  Encouraged exercise.  Gave encouragement and support to continue.       Patient Understanding: good  Expected Compliance: good    Goals:    1. Avoid grazing through, Eat 3 meals with lean protein at each meal, along with a non-starchy vegetable or whole fruit, up to 1/2 c carb at a meal.   -Try hard-boiled eggs to put on your salad or to have later in the day to make up for skipping breakfast.     2. If needing a snack, have vegetables (give at least 2 hours between a meals and a snack).  3. Restart exercise routine with walker (at least 1 time/week walking for 15 min/day slowly increase by 5 minutes a day to a goal of at least 30 minutes or Try a gentle group class)    Follow-Up: 1 month    Time spent with patient: 15 minutes.  Francesca Danielle, MS, RDN, LDN, CLT  Pager: [phone]

## 2017-09-15 NOTE — MR AVS SNAPSHOT
MRN:[id number]                      After Visit Summary   9/15/2017    Elizabeth Palma    MRN: [id number]           Visit Information        Provider Department      9/15/2017 9:30 AM Francesca Danielle RD [facility] Surgical [facility] Management        Your next 10 appointments already scheduled     Sep 25, 2017  9:00 AM CDT   (Arrive by 8:45 AM)   Return Visit with Horacio Sheridan MD   [facility] ([facility])    [address] [place] [id number] [phone]            Oct 10, 2017  1:30 PM CDT   Adult Med Follow UP with Chaparrita Hatch MD   [facility] ([facility])    [address] [facility] [address] [facility] [address]  [facility] [phone]            Oct 13, 2017  9:00 AM CDT   LAB with [facility] ([facility])    [address] [place] [id number] [phone]           Patient must bring picture ID. Patient should be prepared to give a urine specimen  Please do not eat 10-12 hours before your appointment if you are coming in fasting for labs on lipids, cholesterol, or glucose (sugar). Pregnant women should follow their Care Team instructions. Water with medications is okay. Do not drink coffee or other fluids. If you have concerns about taking  your medications, please ask at office or if scheduling via [employer], send a message by clicking on Secure Messaging, Message Your Care Team.            Oct 13, 2017  9:30 AM CDT   NUTRITION VISIT with Francesca Danielle RD   [facility] ([facility])    [address] [place] [address]   [phone]            Nov 17, 2017  9:00 AM CST   LAB with  LAB   [facility] Lab ([facility])    [address] [place] [address] [id number] [phone]           Patient must bring picture ID.  Patient should be prepared to give a urine specimen  Please do not eat 10-12 hours before your appointment if you are coming in fasting for labs on lipids, cholesterol, or glucose (sugar). Pregnant women should follow their Care Team instructions. Water with medications is okay. Do not drink coffee or other fluids. If you have concerns about taking  your medications, please ask at office or if scheduling via [employer], send a message by clicking on Secure Messaging, Message Your Care Team.            Nov 17, 2017  9:30 AM CST   NUTRITION VISIT with Francesca Danielle RD   [facility] Management ([facility])    [address] [place] [address]   [phone]            Nov 20, 2017 10:45 AM CST   (Arrive by 10:30 AM)   Return Visit with Prakash Irene MD   [facility] Medical Weight Management ([facility])    [address] [place] [address]   [phone]            Nov 20, 2017  1:00 PM CST   Return Visit with Javier Tuttle DPM   [facility] ([facility])    [address] [place] [address] [phone]            Dec 22, 2017  9:00 AM CST   LAB with  LAB   [facility] Lab ([facility])    [address] [place] [id number] [phone]           Patient must bring picture ID. Patient should be prepared to give a urine specimen  Please do not eat 10-12 hours before your appointment if you are coming in fasting for labs on lipids, cholesterol, or glucose (sugar). Pregnant women should follow their Care Team instructions. Water with medications is okay. Do not drink coffee or other fluids. If you have concerns about taking  your medications, please ask at office or if scheduling via [employer], send a message by clicking on Secure Messaging, Message Your Care Team.            Dec 22, 2017  9:30 AM  CST   NUTRITION VISIT with Francesca Danielle RD   [facility] Surgical [facility] ([facility])    [address] [address] [phone]              Care Instructions    Goals:    1. Avoid grazing through, Eat 3 meals with lean protein at each meal, along with a non-starchy vegetable or whole fruit, up to 1/2 c carb at a meal.   -Try hard-boiled eggs to put on your salad or to have later in the day to make up for skipping breakfast.     2. If needing a snack, have vegetables (give at least 2 hours between a meals and a snack).  3. Restart exercise routine with walker (at least 1 time/week walking for 15 min/day slowly increase by 5 minutes a day to a goal of at least 30 minutes or Try a gentle group class)         Shoka.me Information     Shoka.me gives you secure access to your electronic health record. If you see a primary care provider, you can also send messages to your care team and make appointments. If you have questions, please call your primary care clinic.  If you do not have a primary care provider, please call [phone] and they will assist you.      Shoka.me is an electronic gateway that provides easy, online access to your medical records. With Shoka.me, you can request a clinic appointment, read your test results, renew a prescription or communicate with your care team.     To access your existing account, please contact your [facility] or call [phone] for assistance.        Care EveryWhere ID     This is your Care EveryWhere ID. This could be used by other organizations to access your [place] medical records  [id number]        Equal Access to Services     LINNEA HIDALGO : Jennifer Quevedo, radha aleman, ty perla. So [facility] [phone].    ATENCIÓN: Si habla español, tiene a goetz disposición servicios gratuitos de asistencia  lingüística. Rachel al [phone].    We comply with applicable federal civil rights laws and [place] laws. We do not discriminate on the basis of race, color, national origin, age, disability sex, sexual orientation or gender identity.

## 2017-09-18 ENCOUNTER — TELEPHONE (OUTPATIENT)
Dept: TRANSPLANT | Facility: CLINIC | Age: 62
End: 2017-09-18

## 2017-09-18 NOTE — TELEPHONE ENCOUNTER
Call made to patient.  She is concerned about her CSA and Creatinine.  CSA level 103  No change made to immunosuppression.  Creatinine 1.06  Reminded patinet to drink more water.  SHe stated I drink a lot but I can drink more.  Plan with patient to check both labs next month.  Patient in agreement.

## 2017-09-19 ENCOUNTER — OFFICE VISIT (OUTPATIENT)
Dept: PSYCHIATRY | Facility: CLINIC | Age: 62
End: 2017-09-19
Attending: PSYCHOLOGIST
Payer: MEDICARE

## 2017-09-19 DIAGNOSIS — F33.1 MAJOR DEPRESSIVE DISORDER, RECURRENT EPISODE, MODERATE (H): Primary | ICD-10-CM

## 2017-09-19 DIAGNOSIS — F43.10 POSTTRAUMATIC STRESS DISORDER: ICD-10-CM

## 2017-09-19 DIAGNOSIS — F41.9 ANXIETY: ICD-10-CM

## 2017-09-19 NOTE — MR AVS SNAPSHOT
After Visit Summary   9/19/2017    Elizabeth Palma    MRN: [id number]           Patient Information     Date Of Birth          1955        Visit Information        Provider Department      9/19/2017 10:30 AM Era Gonzalez, PhD  Psychiatry Clinic        Today's Diagnoses     Major depressive disorder, recurrent episode, moderate (H)    -  1    Anxiety        Posttraumatic stress disorder           Follow-ups after your visit        Your next 10 appointments already scheduled     Sep 25, 2017  9:00 AM CDT   (Arrive by 8:45 AM)   Return Visit with Horacio Sheridan MD   [facility] ([facility])    [address] [place] [address]  [address] [facility] [phone]            Oct 10, 2017  1:30 PM CDT   Adult Med Follow UP with Chaparrita Hatch MD   [facility] ([facility])    [address]  [facility] [address] [phone]            Oct 13, 2017  9:00 AM CDT   LAB with  LAB   [facility] ([facility])    [address] [place] [address]  [address] [facility] [phone]           Patient must bring picture ID. Patient should be prepared to give a urine specimen  Please do not eat 10-12 hours before your appointment if you are coming in fasting for labs on lipids, cholesterol, or glucose (sugar). Pregnant women should follow their Care Team instructions. Water with medications is okay. Do not drink coffee or other fluids. If you have concerns about taking  your medications, please ask at office or if scheduling via [employer], send a message by clicking on Secure Messaging, Message Your Care Team.            Oct 13, 2017  9:30 AM CDT   NUTRITION VISIT with Francesca Danielle RD   [facility] ([facility])    [address] [place] [address]  [address] [facility] [phone]            Nov 17, 2017  9:00 AM CST    LAB with  LAB    [facility] ([facility])    [address] [place] [address]   [phone]           Patient must bring picture ID. Patient should be prepared to give a urine specimen  Please do not eat 10-12 hours before your appointment if you are coming in fasting for labs on lipids, cholesterol, or glucose (sugar). Pregnant women should follow their Care Team instructions. Water with medications is okay. Do not drink coffee or other fluids. If you have concerns about taking  your medications, please ask at office or if scheduling via [employer], send a message by clicking on Secure Messaging, Message Your Care Team.            Nov 17, 2017  9:30 AM CST   NUTRITION VISIT with Francesca Danielle RD   [facility] Management ([facility])    [address] [place] [address]   [phone]            Nov 20, 2017 10:45 AM CST   (Arrive by 10:30 AM)   Return Visit with Prakash Irene MD   [facility] Medical Weight Management ([facility])    [address] [place] [phone]   [phone]            Nov 20, 2017  1:00 PM CST   Return Visit with Javier Tuttle DPM   [facility] ([facility])    [address] [place] [address]   [phone]            Dec 22, 2017  9:00 AM CST   LAB with  LAB   [facility] ([facility])    [address] [place] [address] [phone]           Patient must bring picture ID. Patient should be prepared to give a urine specimen  Please do not eat 10-12 hours before your appointment if you are coming in fasting for labs on lipids, cholesterol, or glucose (sugar). Pregnant women should follow their Care Team instructions. Water with medications is okay. Do not drink coffee or other fluids. If you have  concerns about taking  your medications, please ask at office or if scheduling via ivWatch, send a message by clicking on Secure Messaging, Message Your Care Team.            Dec 22, 2017  9:30 AM CST   NUTRITION VISIT with Francesca Danielle RD   [facility] Management ([facility])    [address] [address] [phone]              Who to contact     Please call your clinic at [phone] to:    Ask questions about your health    Make or cancel appointments    Discuss your medicines    Learn about your test results    Speak to your doctor   If you have compliments or concerns about an experience at your clinic, or if you wish to file a complaint, please contact [facility] Patient Relations at [phone] or email us at [email or web address]         Additional Information About Your Visit        ivWatch Information     ivWatch gives you secure access to your electronic health record. If you see a primary care provider, you can also send messages to your care team and make appointments. If you have questions, please call your primary care clinic.  If you do not have a primary care provider, please call [phone] and they will assist you.      ivWatch is an electronic gateway that provides easy, online access to your medical records. With ivWatch, you can request a clinic appointment, read your test results, renew a prescription or communicate with your care team.     To access your existing account, please contact your [facility] or call [phone] for assistance.        Care EveryWhere ID     This is your Care EveryWhere ID. This could be used by other organizations to access your [place] medical records  [id number]         Blood Pressure from Last 3 Encounters:   09/12/17 122/72   08/21/17 102/70   08/17/17 110/75    Weight from Last 3 Encounters:   09/15/17  76.5 kg (168 lb 9.6 oz)   09/12/17 77.2 kg (170 lb 3.2 oz)   08/17/17 77.8 kg (171 lb 9.6 oz)              Today, you had the following     No orders found for display         Today's Medication Changes          These changes are accurate as of: 9/19/17 11:59 PM.  If you have any questions, ask your nurse or doctor.               These medicines have changed or have updated prescriptions.        Dose/Directions    cyanocobalamin 1000 MCG tablet   Commonly known as:  vitamin  B-12   This may have changed:  when to take this   Used for:  CKD (chronic kidney disease) stage 5, GFR less than 15 ml/min (H)        Dose:  1000 mcg   Take 1 tablet by mouth daily.   Quantity:  30 tablet   Refills:  0       econazole nitrate 1 % cream   This may have changed:    - when to take this  - reasons to take this   Used for:  DM neuro manif type II, Dermatophytosis of foot        Apply topically daily   Quantity:  85 g   Refills:  3                Primary Care Provider Office Phone # Fax #    Horacio Sheridan [phone]       [address] [place] [address]        Equal Access to Services     [facility] Center[facility] AH: Hadii riky richter Somartín, wareedda ash, qaybta kaalmajuly horowitz, ty agrawal . So [facility] [phone].    ATENCIÓN: Si habla español, tiene a goetz disposición servicios gratuitos de asistencia lingüística. [facility] [phone].    We comply with applicable federal civil rights laws and [place] laws. We do not discriminate on the basis of race, color, national origin, age, disability sex, sexual orientation or gender identity.            Thank you!     Thank you for choosing PSYCHIATRY CLINIC  for your care. Our goal is always to provide you with excellent care. Hearing back from our patients is one way we can continue to improve our services. Please take a few minutes to complete the written survey that you may receive in the mail after your visit with us. Thank  you!             Your Updated Medication List - Protect others around you: Learn how to safely use, store and throw away your medicines at www.disposemymeds.org.          This list is accurate as of: 9/19/17 11:59 PM.  Always use your most recent med list.                   Brand Name Dispense Instructions for use Diagnosis    acetylcysteine 600 MG Caps capsule    N-ACETYL CYSTEINE    30 capsule    Take 2 400 mg caps two times daily for total daily dose of 800 mg        albuterol 108 (90 BASE) MCG/ACT Inhaler    PROAIR HFA/PROVENTIL HFA/VENTOLIN HFA    1 Inhaler    Inhale 2 puffs into the lungs every 6 hours as needed for shortness of breath / dyspnea or wheezing    Exercise-induced asthma       ARIPiprazole 2 MG tablet    ABILIFY    15 tablet    Take 0.5 tablets (1 mg) by mouth daily    Major depressive disorder, recurrent episode, moderate (H)       aspirin 81 MG EC tablet     30 tablet    Take 1 tablet (81 mg) by mouth daily    Kidney replaced by transplant       BENADRYL 25 MG capsule   Generic drug:  diphenhydrAMINE      Take 25 mg by mouth At Bedtime.        blood glucose monitoring lancets     1 Box    Use to test blood sugar 2 times daily or as directed.    Type 2 diabetes mellitus with peripheral neuropathy (H)       * blood glucose monitoring meter device kit    no brand specified    1 kit    Use to test blood sugar 2 times daily or as directed.    Type 2 diabetes mellitus with peripheral neuropathy (H)       * blood glucose monitoring meter device kit           blood glucose monitoring test strip    no brand specified    100 strip    Use to test blood sugars 2 times daily or as directed    Type 2 diabetes mellitus with peripheral neuropathy (H)       cyanocobalamin 1000 MCG tablet    vitamin  B-12    30 tablet    Take 1 tablet by mouth daily.    CKD (chronic kidney disease) stage 5, GFR less than 15 ml/min (H)       cycloSPORINE modified 25 MG capsule    GENERIC EQUIVALENT    300 capsule    Take 5  capsules (125 mg) by mouth 2 times daily    Kidney replaced by transplant       econazole nitrate 1 % cream     85 g    Apply topically daily    DM neuro manif type II, Dermatophytosis of foot       furosemide 20 MG tablet    LASIX    90 tablet    Take 1 tablet (20 mg) by mouth daily    Generalized edema       latanoprost 0.005 % ophthalmic solution    XALATAN    2.5 mL    Place 1 drop into both eyes At Bedtime    Mild stage glaucoma       metFORMIN 500 MG 24 hr tablet    GLUCOPHAGE-XR    90 tablet    Take 3 tablets (1,500 mg) by mouth daily (with dinner)    Type 2 diabetes mellitus with diabetic polyneuropathy, without long-term current use of insulin (H)       mycophenolate 250 MG capsule    GENERIC EQUIVALENT    240 capsule    Take 4 capsules (1,000 mg) by mouth 2 times daily    Kidney transplanted       omeprazole 40 MG capsule    priLOSEC    90 capsule    Take 1 capsule (40 mg) by mouth daily    Gastroesophageal reflux disease without esophagitis       ondansetron 4 MG ODT tab    ZOFRAN-ODT    20 tablet    Take 1 tablet (4 mg) by mouth every 6 hours as needed    Chronic kidney disease, stage V (H)       order for DME     1 Units    Walker with front wheels and a seat.    Fall, initial encounter       prazosin 5 MG capsule    MINIPRESS    90 capsule    Take 3 capsules (15 mg) by mouth At Bedtime    Nightmares associated with chronic post-traumatic stress disorder       REFRESH OP      Apply to eye as needed Both eyes        replens Gel     35 g    Use vaginally as needed. Can use up to 3 times per week.    Vaginal dryness       simvastatin 20 MG tablet    ZOCOR    90 tablet    Take 1 tablet (20 mg) by mouth At Bedtime    Chronic kidney disease, stage V (H)       sulfamethoxazole-trimethoprim 400-80 MG per tablet    BACTRIM/SEPTRA    90 tablet    Take 1 tablet by mouth daily    Immunosuppression (H)       topiramate 200 MG tablet    TOPAMAX    60 tablet    Take 1 tablet (200 mg) by mouth 2 times daily    Morbid  obesity due to excess calories (H)       TYLENOL 325 MG tablet   Generic drug:  acetaminophen      Take 325-650 mg by mouth as needed        vilazodone 40 MG Tabs tablet    VIIBRYD    30 tablet    Take 1 tablet (40 mg) by mouth daily    Major depressive disorder, recurrent episode, moderate (H)       vitamin D 1000 UNITS capsule     30 capsule    2,000 Units        * Notice:  This list has 2 medication(s) that are the same as other medications prescribed for you. Read the directions carefully, and ask your doctor or other care provider to review them with you.

## 2017-09-19 NOTE — PROGRESS NOTES
Group Therapy N = 6 present; 70 min  Diagnoses: MDD (F33.1); NELDA(F41,1) PTSD (F43.10)  Co-Facilitators: Alida Solis Lyuba MD      S/O:  Reviewed Homework and Left Over from last week and set new homework.       Other Topics Discussed:   1. Resources  Elizabeth continues to demonstrate seemingly little interest in other's stories;in her body language, by not engaging with peers in eye contact; however, she is listening actively to the discussions as evidenced by her input during topics discussed even when she is seemingly not engaged.  Today, she was almost turned away as a peer discussed her daughter's inability to help around the house, when Elizabeth suddenly piped in with many appropriate suggestions for facilitating the interaction between peer and her daughter.  We will continue to work on modeling behavior that Elizabeth could imitateiin the future.  For now, she is working on developing socializing technique.      2 Physical Safety  A peer made comments about safety of a particular neighborhood and Elizabeth was actively participating in increasing universality by sharing with her peers that she had also lived in that neighborhood and had dealt with dangerous situations.  She validated various peers' concerns about physical abilities given various medical co-morbidities.        3. Major Life Changes/ Future Planning  As peers discussed potential move to a different state, Elizabeth contributed input about difficulty of selling her home and shared some details about her life with peers, thus fostering once again a sense of universality.      Assessment: Elizabeth arrived on time to the group. She does appear to be less engaged and struggles with appropriate body language, given the fact that she is actually attentive to the conversation around her.  We will continue to work on modeling appropriate interaction and peer support.     Plan: Continue weekly group therapy to work on goals. See treatment plan.       Steph  MD Alida   Psychiatry Resident PGY3    I was present for and actively participated in the entire group therapy session, my observations of Elizabeth's  progress and participation are that we need to continue to model and shape non-verbals with Elizabeth and her negative tone of voice.     Era Gonzalez, PhD. LP

## 2017-09-25 ENCOUNTER — OFFICE VISIT (OUTPATIENT)
Dept: INTERNAL MEDICINE | Facility: CLINIC | Age: 62
End: 2017-09-25

## 2017-09-25 VITALS
RESPIRATION RATE: 16 BRPM | BODY MASS INDEX: 31.46 KG/M2 | SYSTOLIC BLOOD PRESSURE: 119 MMHG | WEIGHT: 166.5 LBS | DIASTOLIC BLOOD PRESSURE: 80 MMHG | HEART RATE: 60 BPM

## 2017-09-25 DIAGNOSIS — R11.2 NON-INTRACTABLE VOMITING WITH NAUSEA, UNSPECIFIED VOMITING TYPE: Primary | ICD-10-CM

## 2017-09-25 DIAGNOSIS — Z12.11 SPECIAL SCREENING FOR MALIGNANT NEOPLASMS, COLON: ICD-10-CM

## 2017-09-25 ASSESSMENT — PAIN SCALES - GENERAL: PAINLEVEL: NO PAIN (0)

## 2017-09-25 NOTE — NURSING NOTE
Chief Complaint   Patient presents with     Recheck Medication     patient states the last month she hasn't been feeling well.  She wonders if it is the medication change     HARISH WILLIS at 8:40 AM on 9/25/2017.

## 2017-09-25 NOTE — PROGRESS NOTES
Group Therapy N = 6 present; 70 min  Diagnoses: MDD (F33.1); NELDA(F41,1) PTSD (F43.10)  Co-Facilitators: Murphy Gonzalez, Yosef Pacheco (absent)      S/O:  Reviewed Homework and Left Over from last week and set new homework.       Other Topics Discussed:   1. Exstential Issues--Death and Dying, [place]/Living [place].  A group member brought up the fact that she has no one to ask to put on a living to carry out her wishes of being cremated and no . She reported that she has no friend and she has no contact with her very old, older sisters who have their own families.  Disccussed the importance to reconnect with family and developing new friendships and also the option of donating body to science.     2. Going to [facility] or other [facility] for [facility].   4 group members reported that they have gone to [place] after one group member reported she went for the first time and enjoyed it and even stayed for lunch. Discussed opportunities to volunteer and as well as getting assistance with housing.     3. Managing ongoing medical conditions. A group member reported that she is continuing to manage medical issues and struggle with ageing. Group discussed frustrations with aging.     Assessment: Elizabeth arrived on time to the group. She continues to drift to the negative side of situations. However, she continues to be  more open to feedback from group on their perspective on how to make the most out of her situations. She expressed curiusity about [facility] since she was the only one who has never been there.     Plan: Continue weekly group therapy to work on goals. See treatment plan.

## 2017-09-25 NOTE — MR AVS SNAPSHOT
After Visit Summary   9/25/2017    Elizabeth Palma    MRN: [id number]           Patient Information     Date Of Birth          1955        Visit Information        Provider Department      9/25/2017 9:00 AM Horacio Sheridan MD [facility]        Today's Diagnoses     Special screening for malignant neoplasms, colon    -  1      Care Instructions    [facility]: [phone]     [facility] Medication Refill Request Information:  * Please contact your pharmacy regarding ANY request for medication refills.  ** [facility] Prescription Fax = [phone]  * Please allow 3 business days for routine medication refills.  * Please allow 5 business days for controlled substance medication refills.     Primary Care Center Test Result notification information:  *You will be notified with in 7-10 days of your appointment day regarding the results of your test.  If you are on MyChart you will be notified as soon as the provider has reviewed the results and signed off on them.            Follow-ups after your visit        Additional Services     GI Procedure Referral       Last Lab Result: Creatinine (mg/dL)       Date                     Value                 09/15/2017               1.06 (H)         ----------  Body mass index is 31.46 kg/(m^2).     Needed:  No  Language:  English    Patient will be contacted to schedule procedure.     Please be aware that coverage of these services is subject to the terms and limitations of your health insurance plan.  Call member services at your health plan with any benefit or coverage questions.  Any procedures must be performed at a [facility] facility OR coordinated by your clinic's referral office.    Please bring the following with you to your appointment:    (1) Any X-Rays, CTs or MRIs which have been performed.  Contact the facility where they were done to arrange for  prior to your scheduled appointment.    (2) List of  current medications   (3) This referral request   (4) Any documents/labs given to you for this referral                  Your next 10 appointments already scheduled     Oct 10, 2017  1:30 PM CDT   Adult Med Follow UP with Chaparrita Hatch MD   [facility] ([facility])    [address]  [facility] [address]   [phone]            Oct 13, 2017  9:00 AM CDT   LAB with  LAB   [facility] ([facility])    [address]  [address] [facility] [id number] [phone]           Patient must bring picture ID. Patient should be prepared to give a urine specimen  Please do not eat 10-12 hours before your appointment if you are coming in fasting for labs on lipids, cholesterol, or glucose (sugar). Pregnant women should follow their Care Team instructions. Water with medications is okay. Do not drink coffee or other fluids. If you have concerns about taking  your medications, please ask at office or if scheduling via [employer], send a message by clicking on Secure Messaging, Message Your Care Team.            Oct 13, 2017  9:30 AM CDT   NUTRITION VISIT with Francesca Danielle RD   [facility] Surgical [facility] Management ([facility])    [address]  [address] [facility] [id number] [phone]            Nov 17, 2017  9:00 AM CST   LAB with [facility] ([facility])    [address] [place] [address]  [address] [facility] [id number] [phone]           Patient must bring picture ID. Patient should be prepared to give a urine specimen  Please do not eat 10-12 hours before your appointment if you are coming in fasting for labs on lipids, cholesterol, or glucose (sugar). Pregnant women should follow their Care Team instructions. Water with medications is okay. Do not drink coffee or other fluids. If you have concerns about taking  your medications, please ask at office or if  scheduling via [employer], send a message by clicking on Secure Messaging, Message Your Care Team.            Nov 17, 2017  9:30 AM CST   NUTRITION VISIT with Francesca Danielle RD   [facility] ([facility])    [address] [place] [address]   [phone]            Nov 20, 2017 10:45 AM CST   (Arrive by 10:30 AM)   Return Visit with Prakash Irene MD   [facility] Medical [facility] ([facility])    [address] [place] [address]   [phone]            Nov 20, 2017  1:00 PM CST   Return Visit with Javier Tuttle DPM   [facility] ([facility])    [address] [place] [id number] [phone]            Dec 22, 2017  9:00 AM CST   LAB with  LAB   [facility] ([facility])    [address] [place] [id number] [phone]           Patient must bring picture ID. Patient should be prepared to give a urine specimen  Please do not eat 10-12 hours before your appointment if you are coming in fasting for labs on lipids, cholesterol, or glucose (sugar). Pregnant women should follow their Care Team instructions. Water with medications is okay. Do not drink coffee or other fluids. If you have concerns about taking  your medications, please ask at office or if scheduling via [employer], send a message by clicking on Secure Messaging, Message Your Care Team.            Dec 22, 2017  9:30 AM CST   NUTRITION VISIT with Francesca Danielle RD   [facility] ([facility])    [address] [place] [address]   [phone]            Mar 12, 2018  1:10 PM CDT   (Arrive by 12:40 PM)   Return Kidney Transplant with Christian Jimenez MD   [facility] Nephrology ([facility])    [address] [place] [address]  [address]  [address] [facility] [address] [phone]              Who to contact     Please call your clinic at [phone] to:    Ask questions about your health    Make or cancel appointments    Discuss your medicines    Learn about your test results    Speak to your doctor   If you have compliments or concerns about an experience at your clinic, or if you wish to file a complaint, please contact [facility] Patient Relations at [phone] or email us at [email or web address]         Additional Information About Your Visit        [employer] Information     [employer] gives you secure access to your electronic health record. If you see a primary care provider, you can also send messages to your care team and make appointments. If you have questions, please call your primary care clinic.  If you do not have a primary care provider, please call [phone] and they will assist you.      [employer] is an electronic gateway that provides easy, online access to your medical records. With [employer], you can request a clinic appointment, read your test results, renew a prescription or communicate with your care team.     To access your existing account, please contact your [facility] or call [phone] for assistance.        Care EveryWhere ID     This is your Care EveryWhere ID. This could be used by other organizations to access your [facility] medical records  [id number]        Your Vitals Were     Pulse Respirations BMI (Body Mass Index)             60 16 31.46 kg/m2          Blood Pressure from Last 3 Encounters:   09/25/17 119/80   09/12/17 122/72   08/21/17 102/70    Weight from Last 3 Encounters:   09/25/17 75.5 kg (166 lb 8 oz)   09/15/17 76.5 kg (168 lb 9.6 oz)   09/12/17 77.2 kg (170 lb 3.2 oz)              We Performed the Following     GI Procedure Referral          Today's Medication Changes          These changes are accurate as of:  9/25/17  9:30 AM.  If you have any questions, ask your nurse or doctor.               These medicines have changed or have updated prescriptions.        Dose/Directions    cyanocobalamin 1000 MCG tablet   Commonly known as:  vitamin  B-12   This may have changed:  when to take this   Used for:  CKD (chronic kidney disease) stage 5, GFR less than 15 ml/min (H)        Dose:  1000 mcg   Take 1 tablet by mouth daily.   Quantity:  30 tablet   Refills:  0       econazole nitrate 1 % cream   This may have changed:    - when to take this  - reasons to take this   Used for:  DM neuro manif type II, Dermatophytosis of foot        Apply topically daily   Quantity:  85 g   Refills:  3                Primary Care Provider Office Phone # Fax #    Horacio Sheridan [phone]       [address] [place] [address] [address] [place] [address]        Equal Access to Services     LINNEA HIDALGO : Jennifer richter Somartín, waaxda luqadaha, qaybta kaalmada lor, ty agrawal . So [facility] [phone].    ATENCIÓN: Si habla español, tiene a goetz disposición servicios gratuitos de asistencia lingüística. [facility] [phone].    We comply with applicable federal civil rights laws and [place] laws. We do not discriminate on the basis of race, color, national origin, age, disability sex, sexual orientation or gender identity.            Thank you!     Thank you for choosing [facility]  for your care. Our goal is always to provide you with excellent care. Hearing back from our patients is one way we can continue to improve our services. Please take a few minutes to complete the written survey that you may receive in the mail after your visit with us. Thank you!             Your Updated Medication List - Protect others around you: Learn how to safely use, store and throw away your medicines at www.disposemymeds.org.          This list is accurate as of: 9/25/17  9:30 AM.  Always use your  most recent med list.                   Brand Name Dispense Instructions for use Diagnosis    acetylcysteine 600 MG Caps capsule    N-ACETYL CYSTEINE    30 capsule    Take 2 400 mg caps two times daily for total daily dose of 800 mg        albuterol 108 (90 BASE) MCG/ACT Inhaler    PROAIR HFA/PROVENTIL HFA/VENTOLIN HFA    1 Inhaler    Inhale 2 puffs into the lungs every 6 hours as needed for shortness of breath / dyspnea or wheezing    Exercise-induced asthma       ARIPiprazole 2 MG tablet    ABILIFY    15 tablet    Take 0.5 tablets (1 mg) by mouth daily    Major depressive disorder, recurrent episode, moderate (H)       aspirin 81 MG EC tablet     30 tablet    Take 1 tablet (81 mg) by mouth daily    Kidney replaced by transplant       BENADRYL 25 MG capsule   Generic drug:  diphenhydrAMINE      Take 25 mg by mouth At Bedtime.        blood glucose monitoring lancets     1 Box    Use to test blood sugar 2 times daily or as directed.    Type 2 diabetes mellitus with peripheral neuropathy (H)       * blood glucose monitoring meter device kit    no brand specified    1 kit    Use to test blood sugar 2 times daily or as directed.    Type 2 diabetes mellitus with peripheral neuropathy (H)       * blood glucose monitoring meter device kit           blood glucose monitoring test strip    no brand specified    100 strip    Use to test blood sugars 2 times daily or as directed    Type 2 diabetes mellitus with peripheral neuropathy (H)       cyanocobalamin 1000 MCG tablet    vitamin  B-12    30 tablet    Take 1 tablet by mouth daily.    CKD (chronic kidney disease) stage 5, GFR less than 15 ml/min (H)       cycloSPORINE modified 25 MG capsule    GENERIC EQUIVALENT    300 capsule    Take 5 capsules (125 mg) by mouth 2 times daily    Kidney replaced by transplant       econazole nitrate 1 % cream     85 g    Apply topically daily    DM neuro manif type II, Dermatophytosis of foot       furosemide 20 MG tablet    LASIX    90  tablet    Take 1 tablet (20 mg) by mouth daily    Generalized edema       latanoprost 0.005 % ophthalmic solution    XALATAN    2.5 mL    Place 1 drop into both eyes At Bedtime    Mild stage glaucoma       metFORMIN 500 MG 24 hr tablet    GLUCOPHAGE-XR    90 tablet    Take 3 tablets (1,500 mg) by mouth daily (with dinner)    Type 2 diabetes mellitus with diabetic polyneuropathy, without long-term current use of insulin (H)       mycophenolate 250 MG capsule    GENERIC EQUIVALENT    240 capsule    Take 4 capsules (1,000 mg) by mouth 2 times daily    Kidney transplanted       omeprazole 40 MG capsule    priLOSEC    90 capsule    Take 1 capsule (40 mg) by mouth daily    Gastroesophageal reflux disease without esophagitis       ondansetron 4 MG ODT tab    ZOFRAN-ODT    20 tablet    Take 1 tablet (4 mg) by mouth every 6 hours as needed    Chronic kidney disease, stage V (H)       order for DME     1 Units    Walker with front wheels and a seat.    Fall, initial encounter       prazosin 5 MG capsule    MINIPRESS    90 capsule    Take 3 capsules (15 mg) by mouth At Bedtime    Nightmares associated with chronic post-traumatic stress disorder       REFRESH OP      Apply to eye as needed Both eyes        replens Gel     35 g    Use vaginally as needed. Can use up to 3 times per week.    Vaginal dryness       simvastatin 20 MG tablet    ZOCOR    90 tablet    Take 1 tablet (20 mg) by mouth At Bedtime    Chronic kidney disease, stage V (H)       sulfamethoxazole-trimethoprim 400-80 MG per tablet    BACTRIM/SEPTRA    90 tablet    Take 1 tablet by mouth daily    Immunosuppression (H)       topiramate 200 MG tablet    TOPAMAX    60 tablet    Take 1 tablet (200 mg) by mouth 2 times daily    Morbid obesity due to excess calories (H)       TYLENOL 325 MG tablet   Generic drug:  acetaminophen      Take 325-650 mg by mouth as needed        vilazodone 40 MG Tabs tablet    VIIBRYD    30 tablet    Take 1 tablet (40 mg) by mouth daily     Major depressive disorder, recurrent episode, moderate (H)       vitamin D 1000 UNITS capsule     30 capsule    2,000 Units        * Notice:  This list has 2 medication(s) that are the same as other medications prescribed for you. Read the directions carefully, and ask your doctor or other care provider to review them with you.

## 2017-09-25 NOTE — PATIENT INSTRUCTIONS
[facility]: [phone]     [facility] Medication Refill Request Information:  * Please contact your pharmacy regarding ANY request for medication refills.  ** [facility] Prescription Fax = [phone]  * Please allow 3 business days for routine medication refills.  * Please allow 5 business days for controlled substance medication refills.     [facility] Test Result notification information:  *You will be notified with in 7-10 days of your appointment day regarding the results of your test.  If you are on MyChart you will be notified as soon as the provider has reviewed the results and signed off on them.

## 2017-09-25 NOTE — PROGRESS NOTES
ASSESSMENT/PLAN:  Highly suspect that this is delayed gastric emptying from gastric bypass surgery.  This is clinically how it sounds.  I am not sure diagnostics will help since she had a EGD showing normal post-surgery anatomy.  She should not be on meds because of the side effects - diarrhea and tics.  She should take small amounts of food frequently which she will do.     Horacio Sheridan MD, FACP      Chief complaint:  Elizabeth Palma is a 62 year old female presents for   Chief Complaint   Patient presents with     Recheck Medication     patient states the last month she hasn't been feeling well.  She wonders if it is the medication change        SUBJECTIVE:  She has not been feeling good since adding another Neoral for the transplant.  She feels nauseated after eating about 30 hour afterwards.  She skips breakfast and eats very little for lunch and mostly just dinner.  She has lost about 50lbs because she is trying.  She was sick after eating at 7pm last night and then took the Neoral at 9pm.  It comes and goes but is more often now.  There will be a sudden onset of vomiting.  This is more likely to have nausea and vomiting after protein.  If she drinks too much she will have the same thing.    Medications and allergies were reviewed by me today.         Patient Active Problem List    Diagnosis Date Noted     Type 2 diabetes mellitus with peripheral neuropathy (H) 2015     Priority: High     -donor kidney transplant 2014     Priority: High     Major depressive disorder, recurrent episode, moderate (H) 11/15/2012     Priority: High     Autosomal dominant polycystic kidney disease 2011     Priority: High     (Problem list name updated by automated process. Provider to review and confirm.)       OP (osteoporosis) T score -3.8 2009     Priority: High     2007 T-score -3.7       Age-related osteoporosis without current pathological fracture 08/10/2016     Priority: Medium     Right knee  pain 02/08/2016     Priority: Medium     Left knee pain 02/08/2016     Priority: Medium     Posttraumatic stress disorder 11/24/2015     Priority: Medium     Nightmares associated with chronic post-traumatic stress disorder 10/27/2015     Priority: Medium     Pain in joint involving ankle and foot 07/13/2015     Priority: Medium     Senile osteoporosis 05/29/2015     Priority: Medium     Hyperparathyroidism (H) 02/26/2015     Priority: Medium     Problem list name updated by automated process. Provider to review       Hyperparathyroidism, secondary (H) 02/25/2015     Priority: Medium     Immunosuppressed status (H) 03/20/2014     Priority: Medium     Pain in joint, forearm -- L unhealed Fx 05/21/2013     Priority: Medium     CMC DJD(carpometacarpal degenerative joint disease), localized primary 03/05/2013     Priority: Medium     Generalized anxiety disorder 11/15/2012     Priority: Medium     LION on CPAP 10/15/2012     Priority: Medium     Premature menopause age 37 07/10/2012     Priority: Medium     OCP (vaginal bldg)-->HT which she stopped 2 mo later documented at Jan 12, 2007 visit (age 51).       Sensory loss 10/17/2011     Priority: Medium     Bottom of feet; uncertain if there is a neuropathy per notes.        Hypertension 10/15/2011     Priority: Medium     Hyperlipidemia 10/15/2011     Priority: Medium     Abnormal MRI, cervical spine 10/15/2011     Priority: Medium     4/2011; mild changes noted. Study done for left arm symptoms  Impression:   1. Mild multilevel degenerative disc disease with no significant canal  or neural stenosis seen. motion artifact on the STIR images in these  are not interpretable. The remaining images were interpreted         PHYSICAL EXAM:  /80  Pulse 60  Resp 16  Wt 75.5 kg (166 lb 8 oz)  BMI 31.46 kg/m2  Constitutional: no distress, comfortable, pleasant   Gastrointestinal: positive bowel sounds, nontender, no hepatosplenomegaly, no masses

## 2017-09-26 ENCOUNTER — OFFICE VISIT (OUTPATIENT)
Dept: PSYCHIATRY | Facility: CLINIC | Age: 62
End: 2017-09-26
Attending: PSYCHOLOGIST
Payer: MEDICARE

## 2017-09-26 DIAGNOSIS — F33.1 MAJOR DEPRESSIVE DISORDER, RECURRENT EPISODE, MODERATE (H): Primary | ICD-10-CM

## 2017-09-26 DIAGNOSIS — F43.10 POSTTRAUMATIC STRESS DISORDER: ICD-10-CM

## 2017-09-26 DIAGNOSIS — F41.9 ANXIETY: ICD-10-CM

## 2017-09-26 NOTE — PROGRESS NOTES
"Group Therapy N = 4 present; 70 min  Diagnoses: MDD (F33.1); NELDA(F41,1) PTSD (F43.10)  Co-Facilitators: Alida oSlis Lyuba MD, Yosef Pacheco MD      S/O:  Reviewed Homework and left overfrom last week and set new homework.       Other Topics Discussed:   1. Emotional Boundaries  Elizabeth brought up a topic of feeling taken for granted by her mother.  The group was very vocal in advocating for Elizabeth and offered advice on how to deal with a situation where one is being criticized and not acknowledged for their sacrifices.  Elizabeth was receptive to others' suggestions and was able to take direct feedback about appearing uninterested when others are speaking despite evidence to the contrary in discussion.       2. Social Engagement  A peer asked for advice on how to be more socially engaged and how to come out of her \"shell.\"  Elizabeth contributed good comments and supportive feedback to the peer.        3. Interaction with Healthcare  As peer brought up an account of an unfavorable interaction with a medical professional and Elizabeth was able to engage with this peer and gave a brief example of her own problematic interaction in the past, thus demonstrating universality and instilling hope for the peer.      Assessment: Elizabeth arrived on time to the group. She accepted feedback for appearing uninterested and reported that she was unaware of this.  Throughout the remainder of the session she appeared much more engaged and made an effort not to prop up her head with her hand.    Plan: Continue weekly group therapy to work on goals. See treatment plan.       Steph Irwin MD   Psychiatry Resident PGY3    I was present for and actively participated in the entire group therapy session, my observations of Elizabeth Palma s progress and participation are that Elizabeth appeared to accept the direct feedback regarding her nonverbals but limited insight into the negative tone of her voice.     Era Gonzalez, PhD. LP  "

## 2017-09-26 NOTE — MR AVS SNAPSHOT
After Visit Summary   9/26/2017    Elizabeth Palma    MRN: [id number]           Patient Information     Date Of Birth          1955        Visit Information        Provider Department      9/26/2017 10:30 AM Era Gonzalez, PhD  [facility]        Today's Diagnoses     Major depressive disorder, recurrent episode, moderate (H)    -  1    Anxiety        Posttraumatic stress disorder           Follow-ups after your visit        Your next 10 appointments already scheduled     Oct 10, 2017  1:30 PM CDT   Adult Med Follow UP with Chaparrita Hatch MD   [facility] ([facility])    [address]  [facility] [address]   [phone]            Oct 13, 2017  9:00 AM CDT   LAB with  LAB   [facility] ([facility])    [address] [place] [address]  [facility] [id number] [phone]           Patient must bring picture ID. Patient should be prepared to give a urine specimen  Please do not eat 10-12 hours before your appointment if you are coming in fasting for labs on lipids, cholesterol, or glucose (sugar). Pregnant women should follow their Care Team instructions. Water with medications is okay. Do not drink coffee or other fluids. If you have concerns about taking  your medications, please ask at office or if scheduling via [employer], send a message by clicking on Secure Messaging, Message Your Care Team.            Oct 13, 2017  9:30 AM CDT   NUTRITION VISIT with Francesca Danielle RD   [facility] ([facility])    [address] [place] [address]  [address] [id number] [phone]            Nov 10, 2017 10:00 AM CST   (Arrive by 9:45 AM)   PRE-OP with Horacio Sheridan MD   [facility] ([facility])    [address] [place] [address]  [facility] [id number] [phone]            Nov 17, 2017  9:00 AM CST   LAB  with  LAB   [facility] ([facility])    [address] [place] [address]   [phone]           Patient must bring picture ID. Patient should be prepared to give a urine specimen  Please do not eat 10-12 hours before your appointment if you are coming in fasting for labs on lipids, cholesterol, or glucose (sugar). Pregnant women should follow their Care Team instructions. Water with medications is okay. Do not drink coffee or other fluids. If you have concerns about taking  your medications, please ask at office or if scheduling via [employer], send a message by clicking on Secure Messaging, Message Your Care Team.            Nov 17, 2017  9:30 AM CST   NUTRITION VISIT with Francesca Danielle RD   [facility] ([facility])    [address] [place] [address]   [phone]            Nov 20, 2017 10:45 AM CST   (Arrive by 10:30 AM)   Return Visit with Prakash Irene MD   [facility] ([facility])    [address] [place] [address]   [phone]            Nov 20, 2017  1:00 PM CST   Return Visit with Javier Tuttle DPM   [facility] ([facility])    [address] [place] [id number] [phone]            Nov 30, 2017   Procedure with Marciano Chisholm MD   [facility] [place] ([facility])    [address] [address]   [phone]           The [facility] is located on the corner of [address] and [address] on the [address]. It is easily accessible from virtually any point in the [facility] area, via [address] and [address].            Dec 22, 2017  9:00 AM CST   LAB with  LAB    [facility] ([facility])    [address] [place] [address]  [address]  [address] [facility] [address] [phone]           Patient must bring picture ID. Patient should be prepared to give a urine specimen  Please do not eat 10-12 hours before your appointment if you are coming in fasting for labs on lipids, cholesterol, or glucose (sugar). Pregnant women should follow their Care Team instructions. Water with medications is okay. Do not drink coffee or other fluids. If you have concerns about taking  your medications, please ask at office or if scheduling via [employer], send a message by clicking on Secure Messaging, Message Your Care Team.              Who to contact     Please call your clinic at [phone] to:    Ask questions about your health    Make or cancel appointments    Discuss your medicines    Learn about your test results    Speak to your doctor   If you have compliments or concerns about an experience at your clinic, or if you wish to file a complaint, please contact [facility] Patient Relations at [phone] or email us at [email or web address]         Additional Information About Your Visit        [employer] Information     [employer] gives you secure access to your electronic health record. If you see a primary care provider, you can also send messages to your care team and make appointments. If you have questions, please call your primary care clinic.  If you do not have a primary care provider, please call [phone] and they will assist you.      [employer] is an electronic gateway that provides easy, online access to your medical records. With [employer], you can request a clinic appointment, read your test results, renew a prescription or communicate with your care team.     To access your existing account, please contact your [facility] Clinic or call [phone] for assistance.        Care EveryWhere ID     This is your Care EveryWhere ID. This could be used by other organizations to  access your [facility] medical records  [id number]         Blood Pressure from Last 3 Encounters:   09/25/17 119/80   09/12/17 122/72   08/21/17 102/70    Weight from Last 3 Encounters:   09/25/17 75.5 kg (166 lb 8 oz)   09/15/17 76.5 kg (168 lb 9.6 oz)   09/12/17 77.2 kg (170 lb 3.2 oz)              Today, you had the following     No orders found for display         Today's Medication Changes          These changes are accurate as of: 9/26/17 11:59 PM.  If you have any questions, ask your nurse or doctor.               These medicines have changed or have updated prescriptions.        Dose/Directions    cyanocobalamin 1000 MCG tablet   Commonly known as:  vitamin  B-12   This may have changed:  when to take this   Used for:  CKD (chronic kidney disease) stage 5, GFR less than 15 ml/min (H)        Dose:  1000 mcg   Take 1 tablet by mouth daily.   Quantity:  30 tablet   Refills:  0       econazole nitrate 1 % cream   This may have changed:    - when to take this  - reasons to take this   Used for:  Type II or unspecified type diabetes mellitus with neurological manifestations, not stated as uncontrolled(250.60) (H), Dermatophytosis of foot        Apply topically daily   Quantity:  85 g   Refills:  3                Primary Care Provider    Horacio Sheridan MD       PWB  [place] [id number]        Equal Access to Services     [facility] AH: Hadii riky lehman hadasho Soomaali, waaxda luqadaha, qaybta kaalmada adeegyada, ty fam. So [facility] [phone].    ATENCIÓN: Si habla español, tiene a goetz disposición servicios gratuitos de asistencia lingüística. Llame al [phone].    We comply with applicable federal civil rights laws and [place] laws. We do not discriminate on the basis of race, color, national origin, age, disability, sex, sexual orientation, or gender identity.            Thank you!     Thank you for choosing PS[facility]RY CLINIC  for your care. Our goal is always to provide you with excellent  care. Hearing back from our patients is one way we can continue to improve our services. Please take a few minutes to complete the written survey that you may receive in the mail after your visit with us. Thank you!             Your Updated Medication List - Protect others around you: Learn how to safely use, store and throw away your medicines at www.disposemymeds.org.          This list is accurate as of: 9/26/17 11:59 PM.  Always use your most recent med list.                   Brand Name Dispense Instructions for use Diagnosis    acetylcysteine 600 MG Caps capsule    N-ACETYL CYSTEINE    30 capsule    Take 2 400 mg caps two times daily for total daily dose of 800 mg        albuterol 108 (90 BASE) MCG/ACT Inhaler    PROAIR HFA/PROVENTIL HFA/VENTOLIN HFA    1 Inhaler    Inhale 2 puffs into the lungs every 6 hours as needed for shortness of breath / dyspnea or wheezing    Exercise-induced asthma       ARIPiprazole 2 MG tablet    ABILIFY    15 tablet    Take 0.5 tablets (1 mg) by mouth daily    Major depressive disorder, recurrent episode, moderate (H)       aspirin 81 MG EC tablet     30 tablet    Take 1 tablet (81 mg) by mouth daily    Kidney replaced by transplant       BENADRYL 25 MG capsule   Generic drug:  diphenhydrAMINE      Take 25 mg by mouth At Bedtime.        blood glucose monitoring lancets     1 Box    Use to test blood sugar 2 times daily or as directed.    Type 2 diabetes mellitus with peripheral neuropathy (H)       * blood glucose monitoring meter device kit    no brand specified    1 kit    Use to test blood sugar 2 times daily or as directed.    Type 2 diabetes mellitus with peripheral neuropathy (H)       * blood glucose monitoring meter device kit           blood glucose monitoring test strip    no brand specified    100 strip    Use to test blood sugars 2 times daily or as directed    Type 2 diabetes mellitus with peripheral neuropathy (H)       cyanocobalamin 1000 MCG tablet    vitamin  B-12     30 tablet    Take 1 tablet by mouth daily.    CKD (chronic kidney disease) stage 5, GFR less than 15 ml/min (H)       cycloSPORINE modified 25 MG capsule    GENERIC EQUIVALENT    300 capsule    Take 5 capsules (125 mg) by mouth 2 times daily    Kidney replaced by transplant       econazole nitrate 1 % cream     85 g    Apply topically daily    Type II or unspecified type diabetes mellitus with neurological manifestations, not stated as uncontrolled(250.60) (H), Dermatophytosis of foot       furosemide 20 MG tablet    LASIX    90 tablet    Take 1 tablet (20 mg) by mouth daily    Generalized edema       latanoprost 0.005 % ophthalmic solution    XALATAN    2.5 mL    Place 1 drop into both eyes At Bedtime    Mild stage glaucoma(365.71)       metFORMIN 500 MG 24 hr tablet    GLUCOPHAGE-XR    90 tablet    Take 3 tablets (1,500 mg) by mouth daily (with dinner)    Type 2 diabetes mellitus with diabetic polyneuropathy, without long-term current use of insulin (H)       mycophenolate 250 MG capsule    GENERIC EQUIVALENT    240 capsule    Take 4 capsules (1,000 mg) by mouth 2 times daily    Kidney transplanted       omeprazole 40 MG capsule    priLOSEC    90 capsule    Take 1 capsule (40 mg) by mouth daily    Gastroesophageal reflux disease without esophagitis       ondansetron 4 MG ODT tab    ZOFRAN-ODT    20 tablet    Take 1 tablet (4 mg) by mouth every 6 hours as needed    Chronic kidney disease, stage V (H)       order for DME     1 Units    Walker with front wheels and a seat.    Fall, initial encounter       prazosin 5 MG capsule    MINIPRESS    90 capsule    Take 3 capsules (15 mg) by mouth At Bedtime    Nightmares associated with chronic post-traumatic stress disorder       REFRESH OP      Apply to eye as needed Both eyes        replens Gel     35 g    Use vaginally as needed. Can use up to 3 times per week.    Vaginal dryness       simvastatin 20 MG tablet    ZOCOR    90 tablet    Take 1 tablet (20 mg) by mouth At  Bedtime    Chronic kidney disease, stage V (H)       sulfamethoxazole-trimethoprim 400-80 MG per tablet    BACTRIM/SEPTRA    90 tablet    Take 1 tablet by mouth daily    Immunosuppression (H)       topiramate 200 MG tablet    TOPAMAX    60 tablet    Take 1 tablet (200 mg) by mouth 2 times daily    Morbid obesity due to excess calories (H)       TYLENOL 325 MG tablet   Generic drug:  acetaminophen      Take 325-650 mg by mouth as needed        vilazodone 40 MG Tabs tablet    VIIBRYD    30 tablet    Take 1 tablet (40 mg) by mouth daily    Major depressive disorder, recurrent episode, moderate (H)       vitamin D 1000 UNITS capsule     30 capsule    2,000 Units        * Notice:  This list has 2 medication(s) that are the same as other medications prescribed for you. Read the directions carefully, and ask your doctor or other care provider to review them with you.

## 2017-10-03 ENCOUNTER — OFFICE VISIT (OUTPATIENT)
Dept: PSYCHIATRY | Facility: CLINIC | Age: 62
End: 2017-10-03
Attending: PSYCHOLOGIST
Payer: MEDICARE

## 2017-10-03 DIAGNOSIS — F33.1 MAJOR DEPRESSIVE DISORDER, RECURRENT EPISODE, MODERATE (H): Primary | ICD-10-CM

## 2017-10-03 DIAGNOSIS — F41.9 ANXIETY: ICD-10-CM

## 2017-10-03 DIAGNOSIS — F43.10 POSTTRAUMATIC STRESS DISORDER: ICD-10-CM

## 2017-10-03 NOTE — MR AVS SNAPSHOT
After Visit Summary   10/3/2017    Elizabeth Palma    MRN: [id number]           Patient Information     Date Of Birth          1955        Visit Information        Provider Department      10/3/2017 10:30 AM Era Gonzalez, PhD  Psychiatry Clinic        Today's Diagnoses     Major depressive disorder, recurrent episode, moderate (H)    -  1    Anxiety        Posttraumatic stress disorder           Follow-ups after your visit        Your next 10 appointments already scheduled     Nov 10, 2017 10:00 AM CST   (Arrive by 9:45 AM)   PRE-OP with Horacio Sheridan MD   [facility] ([facility])    [address] [place] [address]  4th [facility] [id number] [phone]            Nov 14, 2017  1:00 PM CST   Adult Med Follow UP with Chaparrita Hatch MD   [facility] Psychiatry ([facility])    [address]  [facility] [address] [phone]            Nov 17, 2017  9:00 AM CST   LAB with  LAB   [facility] Lab ([facility])    [address] [place] [id number] [phone]           Patient must bring picture ID. Patient should be prepared to give a urine specimen  Please do not eat 10-12 hours before your appointment if you are coming in fasting for labs on lipids, cholesterol, or glucose (sugar). Pregnant women should follow their Care Team instructions. Water with medications is okay. Do not drink coffee or other fluids. If you have concerns about taking  your medications, please ask at office or if scheduling via [employer], send a message by clicking on Secure Messaging, Message Your Care Team.            Nov 17, 2017  9:30 AM CST   NUTRITION VISIT with Francesca Danielle RD   [facility] Surgical Weight Management ([facility])    [address] [place] [address]  4th [facility] [id number] [phone]            Nov 20, 2017 10:45 AM CST    (Arrive by 10:30 AM)   Return Visit with Prakash Irene MD   [facility] Medical Weight Management ([facility])    [address] [place] [address]  4th [facility] [address]   [phone]            Nov 20, 2017  1:00 PM CST   Return Visit with Javier Tuttle DPM   [facility] ([facility])    [address] [place] [id number] [phone]            Nov 30, 2017   Procedure with Marciano Chisholm MD   [facility] [place], Endoscopy ([facility])    [address] [address] [phone]           The [facility] is located on the corner of [address] and [address] on the [facility]. It is easily accessible from virtually any point in the [facility] [place] area, via [address] and [address].            Dec 22, 2017  9:00 AM CST   LAB with  LAB   [facility] Lab ([facility])    [address] [place] [id number] [phone]           Patient must bring picture ID. Patient should be prepared to give a urine specimen  Please do not eat 10-12 hours before your appointment if you are coming in fasting for labs on lipids, cholesterol, or glucose (sugar). Pregnant women should follow their Care Team instructions. Water with medications is okay. Do not drink coffee or other fluids. If you have concerns about taking  your medications, please ask at office or if scheduling via [employer], send a message by clicking on Secure Messaging, Message Your Care Team.            Dec 22, 2017  9:30 AM CST   NUTRITION VISIT with Francesca Danielle RD   [facility] Surgical [facility] Management ([facility] [place])    [address] [place] [address]  [address]th [facility] [address]   [phone]            Jan 08, 2018  9:30 AM CST   (Arrive by 9:15 AM)   Return Visit with Horacio Sheridan MD   [facility]  [facility] ([facility] [place])    [address]  4th [facility] [address] [phone]              Who to contact     Please call your clinic at [phone] to:    Ask questions about your health    Make or cancel appointments    Discuss your medicines    Learn about your test results    Speak to your doctor   If you have compliments or concerns about an experience at your clinic, or if you wish to file a complaint, please contact [facility] Patient Relations at [phone] or email us at [email or web address]         Additional Information About Your Visit        [employer] Information     [employer] gives you secure access to your electronic health record. If you see a primary care provider, you can also send messages to your care team and make appointments. If you have questions, please call your primary care clinic.  If you do not have a primary care provider, please call [phone] and they will assist you.      [employer] is an electronic gateway that provides easy, online access to your medical records. With [employer], you can request a clinic appointment, read your test results, renew a prescription or communicate with your care team.     To access your existing account, please contact your [facility] or call [phone] for assistance.        Care EveryWhere ID     This is your Care EveryWhere ID. This could be used by other organizations to access your [place] medical records  [id number]         Blood Pressure from Last 3 Encounters:   10/10/17 120/81   09/25/17 119/80   09/12/17 122/72    Weight from Last 3 Encounters:   10/13/17 75.6 kg (166 lb 9.6 oz)   10/10/17 75.1 kg (165 lb 9.6 oz)   09/25/17 75.5 kg (166 lb 8 oz)              Today, you had the following     No orders found for display         Today's Medication Changes          These changes are accurate as of: 10/3/17 11:59 PM.  If you have  any questions, ask your nurse or doctor.               These medicines have changed or have updated prescriptions.        Dose/Directions    cyanocobalamin 1000 MCG tablet   Commonly known as:  vitamin  B-12   This may have changed:  when to take this   Used for:  CKD (chronic kidney disease) stage 5, GFR less than 15 ml/min (H)        Dose:  1000 mcg   Take 1 tablet by mouth daily.   Quantity:  30 tablet   Refills:  0       econazole nitrate 1 % cream   This may have changed:    - when to take this  - reasons to take this   Used for:  Type II or unspecified type diabetes mellitus with neurological manifestations, not stated as uncontrolled(250.60) (H), Dermatophytosis of foot        Apply topically daily   Quantity:  85 g   Refills:  3                Primary Care Provider    [place]PRIYA Barn[place] [id number]        Equal Access to Services     [facility]: Jennifer Quevedo, waaxda luqadaha, qaybta kaalmada jakobyajuly, ty agrawal . So [facility] [phone].    ATENCIÓN: Si habla español, tiene a goetz disposición servicios gratuitos de asistencia lingüística. Llame al [phone].    We comply with applicable federal civil rights laws and [place] laws. We do not discriminate on the basis of race, color, national origin, age, disability, sex, sexual orientation, or gender identity.            Thank you!     Thank you for choosing PSYCHIATRY CLINIC  for your care. Our goal is always to provide you with excellent care. Hearing back from our patients is one way we can continue to improve our services. Please take a few minutes to complete the written survey that you may receive in the mail after your visit with us. Thank you!             Your Updated Medication List - Protect others around you: Learn how to safely use, store and throw away your medicines at www.disposemymeds.org.          This list is accurate as of: 10/3/17 11:59 PM.  Always use your most recent med list.                    Brand Name Dispense Instructions for use Diagnosis    acetylcysteine 600 MG Caps capsule    N-ACETYL CYSTEINE    30 capsule    Take 2 400 mg caps two times daily for total daily dose of 800 mg        albuterol 108 (90 BASE) MCG/ACT Inhaler    PROAIR HFA/PROVENTIL HFA/VENTOLIN HFA    1 Inhaler    Inhale 2 puffs into the lungs every 6 hours as needed for shortness of breath / dyspnea or wheezing    Exercise-induced asthma       ARIPiprazole 2 MG tablet    ABILIFY    15 tablet    Take 0.5 tablets (1 mg) by mouth daily    Major depressive disorder, recurrent episode, moderate (H)       aspirin 81 MG EC tablet     30 tablet    Take 1 tablet (81 mg) by mouth daily    Kidney replaced by transplant       BENADRYL 25 MG capsule   Generic drug:  diphenhydrAMINE      Take 25 mg by mouth At Bedtime.        blood glucose monitoring lancets     1 Box    Use to test blood sugar 2 times daily or as directed.    Type 2 diabetes mellitus with peripheral neuropathy (H)       * blood glucose monitoring meter device kit    no brand specified    1 kit    Use to test blood sugar 2 times daily or as directed.    Type 2 diabetes mellitus with peripheral neuropathy (H)       * blood glucose monitoring meter device kit           blood glucose monitoring test strip    no brand specified    100 strip    Use to test blood sugars 2 times daily or as directed    Type 2 diabetes mellitus with peripheral neuropathy (H)       cyanocobalamin 1000 MCG tablet    vitamin  B-12    30 tablet    Take 1 tablet by mouth daily.    CKD (chronic kidney disease) stage 5, GFR less than 15 ml/min (H)       cycloSPORINE modified 25 MG capsule    GENERIC EQUIVALENT    300 capsule    Take 5 capsules (125 mg) by mouth 2 times daily    Kidney replaced by transplant       econazole nitrate 1 % cream     85 g    Apply topically daily    Type II or unspecified type diabetes mellitus with neurological manifestations, not stated as uncontrolled(250.60) (H),  Dermatophytosis of foot       furosemide 20 MG tablet    LASIX    90 tablet    Take 1 tablet (20 mg) by mouth daily    Generalized edema       latanoprost 0.005 % ophthalmic solution    XALATAN    2.5 mL    Place 1 drop into both eyes At Bedtime    Mild stage glaucoma(365.71)       metFORMIN 500 MG 24 hr tablet    GLUCOPHAGE-XR    90 tablet    Take 3 tablets (1,500 mg) by mouth daily (with dinner)    Type 2 diabetes mellitus with diabetic polyneuropathy, without long-term current use of insulin (H)       mycophenolate 250 MG capsule    GENERIC EQUIVALENT    240 capsule    Take 4 capsules (1,000 mg) by mouth 2 times daily    Kidney transplanted       omeprazole 40 MG capsule    priLOSEC    90 capsule    Take 1 capsule (40 mg) by mouth daily    Gastroesophageal reflux disease without esophagitis       ondansetron 4 MG ODT tab    ZOFRAN-ODT    20 tablet    Take 1 tablet (4 mg) by mouth every 6 hours as needed    Chronic kidney disease, stage V (H)       order for DME     1 Units    Walker with front wheels and a seat.    Fall, initial encounter       prazosin 5 MG capsule    MINIPRESS    90 capsule    Take 3 capsules (15 mg) by mouth At Bedtime    Nightmares associated with chronic post-traumatic stress disorder       REFRESH OP      Apply to eye as needed Both eyes        replens Gel     35 g    Use vaginally as needed. Can use up to 3 times per week.    Vaginal dryness       simvastatin 20 MG tablet    ZOCOR    90 tablet    Take 1 tablet (20 mg) by mouth At Bedtime    Chronic kidney disease, stage V (H)       sulfamethoxazole-trimethoprim 400-80 MG per tablet    BACTRIM/SEPTRA    90 tablet    Take 1 tablet by mouth daily    Immunosuppression (H)       topiramate 200 MG tablet    TOPAMAX    60 tablet    Take 1 tablet (200 mg) by mouth 2 times daily    Morbid obesity due to excess calories (H)       TYLENOL 325 MG tablet   Generic drug:  acetaminophen      Take 325-650 mg by mouth as needed        vilazodone 40 MG Tabs  tablet    VIIBRYD    30 tablet    Take 1 tablet (40 mg) by mouth daily    Major depressive disorder, recurrent episode, moderate (H)       vitamin D 1000 UNITS capsule     30 capsule    2,000 Units        * Notice:  This list has 2 medication(s) that are the same as other medications prescribed for you. Read the directions carefully, and ask your doctor or other care provider to review them with you.

## 2017-10-04 NOTE — PROGRESS NOTES
"Group Therapy N = 3 present; 70 min  Diagnoses: MDD (F33.1); NELDA(F41,1) PTSD (F43.10)  Co-Facilitators: Alida Solis Lyuba MD, Yosef Pacheco MD      S/O:  Reviewed Homework and left overfrom last week and set new homework.       Other Topics Discussed:   1. Safety - Assertiveness  A peer brought up an incident where she would not allow a stranger into her apartment building. Elizabeth validated her concerns and demonstrated universality as she shared similar incidents in her own building that threaten resident safety. Elizabeth said that she and her  allow strangers to enter, because they don't want to hurt anyone. But she said that has made several attempts to bring it to the attention of management who has ignored their requests. Elizabeth found a negative consequence to every suggestion that was offered. She said she couldn't be on the board because she doesn't have time and she is on too many boards.     2. Social Engagement and Support  A peer shared her attempts at socializing as well as challenges that she has with it - such as finding something to connect with people, being around \"weird people\" etc. Group listened and offered suggestions such as preparing flash cards ahead of time to make small talk with strangers/potential friends. Peer also confirmed the benefits of socialaizing,  She met someone on the bus and that person referred her  to a place that gives away free pet supplies.Group praised this peer for being assertive and street beach. Elizabeth agreed.          3. DBT Skills  Elizabeth reported an incident where she got into an argument with her  over getting supplies ready to make bread.  said \"Now you gotta let me eat breakfast\". This upset Elizabeth very much that she told her  how he hurt her and she cried the whole day.  ended up cancelling his meeting with a friend that morning. Group processed the incident with Elizabeth who was for a long term stuck on how her  had " "hurt her feelings. Group talked about DBT skills - TIPPS, STOP. How could you be in charge of your own feelings and not get hurt.  Lead therapist suggested that Elizabeth brush up on DBT skills.     Assessment: Elizabeth arrived on time to the group. She demonstrated interpersonal learning when she reported on how she took group's feedback and executed boundaries with mother. She accepted feedback as group tried to install hope on how to employ DBT skills to execute emotional boundaries with her .    Plan: Continue weekly group therapy to work on goals. See treatment plan. Recommendation to refresh DBT skills.      Yosef Pacheco MD  Psychiatry Resident PGY3    I was present for and actively participated in the entire group therapy session, my observations of Elizabeth Palma s progress and participation are that Elizabeth got some direct feedback today including how her  appears to do kind gestures such as taking down all of the ingredients for baking while interrupting eating breakfast and the result is Elizabeth crying for more that a day. This ongoing reactivity and difficulty regulating appears to necessitate the need to practice DBT skills. Therapist checked in twice during the feedback to make sure she was accepting this in a positive way. Subsequently Elizabeth called therapist 2 days after group and quit both group and couple's counseling stating that \"my therapist and  agree.\" This therapist attempted to persuade Elizabeth to come back for one more session to say good-bye. She declined. Therapist plans to contact her individual therapist to discuss--Elizabeth may not have enough ego strength for a process group.    Era Gonzalez, PhD,LP  "

## 2017-10-06 ENCOUNTER — TELEPHONE (OUTPATIENT)
Dept: PSYCHIATRY | Facility: CLINIC | Age: 62
End: 2017-10-06

## 2017-10-10 ENCOUNTER — OFFICE VISIT (OUTPATIENT)
Dept: PSYCHIATRY | Facility: CLINIC | Age: 62
End: 2017-10-10

## 2017-10-10 VITALS
HEIGHT: 61 IN | WEIGHT: 165.6 LBS | SYSTOLIC BLOOD PRESSURE: 120 MMHG | DIASTOLIC BLOOD PRESSURE: 81 MMHG | HEART RATE: 97 BPM | BODY MASS INDEX: 31.26 KG/M2

## 2017-10-10 DIAGNOSIS — F51.5 NIGHTMARES ASSOCIATED WITH CHRONIC POST-TRAUMATIC STRESS DISORDER: ICD-10-CM

## 2017-10-10 DIAGNOSIS — F33.1 MAJOR DEPRESSIVE DISORDER, RECURRENT EPISODE, MODERATE (H): Primary | ICD-10-CM

## 2017-10-10 DIAGNOSIS — F43.10 POSTTRAUMATIC STRESS DISORDER: ICD-10-CM

## 2017-10-10 DIAGNOSIS — F43.12 NIGHTMARES ASSOCIATED WITH CHRONIC POST-TRAUMATIC STRESS DISORDER: ICD-10-CM

## 2017-10-10 DIAGNOSIS — F41.9 ANXIETY: ICD-10-CM

## 2017-10-10 NOTE — MR AVS SNAPSHOT
After Visit Summary   10/10/2017    Elizabeth Palma    MRN: [id number]           Patient Information     Date Of Birth          1955        Visit Information        Provider Department      10/10/2017 1:30 PM Chaparrita Hatch MD [facility]         Follow-ups after your visit        Your next 10 appointments already scheduled     Oct 13, 2017  9:00 AM CDT   LAB with  LAB   [facility] ([facility])    [address] [place] [id number] [phone]           Patient must bring picture ID. Patient should be prepared to give a urine specimen  Please do not eat 10-12 hours before your appointment if you are coming in fasting for labs on lipids, cholesterol, or glucose (sugar). Pregnant women should follow their Care Team instructions. Water with medications is okay. Do not drink coffee or other fluids. If you have concerns about taking  your medications, please ask at office or if scheduling via [employer], send a message by clicking on Secure Messaging, Message Your Care Team.            Oct 13, 2017  9:30 AM CDT   NUTRITION VISIT with Francesca Danielle RD   [facility] ([facility])    [address] [place] [address]   [phone]            Nov 10, 2017 10:00 AM CST   (Arrive by 9:45 AM)   PRE-OP with Horacio Sheridan MD   [facility] ([facility])    [address] [place] [id number] [phone]            Nov 14, 2017  1:00 PM CST   Adult Med Follow UP with Chaparrita Hatch MD   [facility] ([facility])    [address]  [facility] [address]   [phone]            Nov 17, 2017  9:00 AM CST   LAB with  LAB   [facility] ([facility])    [address] [place] [id number] [phone]            Patient must bring picture ID. Patient should be prepared to give a urine specimen  Please do not eat 10-12 hours before your appointment if you are coming in fasting for labs on lipids, cholesterol, or glucose (sugar). Pregnant women should follow their Care Team instructions. Water with medications is okay. Do not drink coffee or other fluids. If you have concerns about taking  your medications, please ask at office or if scheduling via [employer], send a message by clicking on Secure Messaging, Message Your Care Team.            Nov 17, 2017  9:30 AM CST   NUTRITION VISIT with Francesca Danielle RD   [facility] ([facility])    [address] [facility] [address]  [address] [facility] [address]   [phone]            Nov 20, 2017 10:45 AM CST   (Arrive by 10:30 AM)   Return Visit with Prakash Irene MD   [facility] ([facility])    [address] [place] [id number]   [phone]            Nov 20, 2017  1:00 PM CST   Return Visit with Javier Tuttle DPM   [facility] ([facility])    [address] [place] [id number] [phone]            Nov 30, 2017   Procedure with Marciano Chisholm MD   [facility] [place] ([facility])    [address] [address] [phone]           The [facility] is located on the corner of [address] and [address] on the [facility]. It is easily accessible from virtually any point in the [place] [facility] area, via [address].            Dec 22, 2017  9:00 AM CST   LAB with  LAB   [facility] Lab ([facility])    [address] [place] [id number] [phone]           Patient must bring picture ID. Patient should be prepared to give a urine  "specimen  Please do not eat 10-12 hours before your appointment if you are coming in fasting for labs on lipids, cholesterol, or glucose (sugar). Pregnant women should follow their Care Team instructions. Water with medications is okay. Do not drink coffee or other fluids. If you have concerns about taking  your medications, please ask at office or if scheduling via [employer], send a message by clicking on Secure Messaging, Message Your Care Team.              Who to contact     Please call your clinic at [phone] to:    Ask questions about your health    Make or cancel appointments    Discuss your medicines    Learn about your test results    Speak to your doctor   If you have compliments or concerns about an experience at your clinic, or if you wish to file a complaint, please contact [facility] Patient Relations at [phone] or email us at [email or web address]         Additional Information About Your Visit        [employer] Information     [employer] gives you secure access to your electronic health record. If you see a primary care provider, you can also send messages to your care team and make appointments. If you have questions, please call your primary care clinic.  If you do not have a primary care provider, please call [phone] and they will assist you.      [employer] is an electronic gateway that provides easy, online access to your medical records. With [employer], you can request a clinic appointment, read your test results, renew a prescription or communicate with your care team.     To access your existing account, please contact your [facility] or call [phone] for assistance.        Care EveryWhere ID     This is your Care EveryWhere ID. This could be used by other organizations to access your [place] medical records  [id number]        Your Vitals Were     Pulse Height BMI (Body Mass Index)             97 5' 1\" (154.9 cm) " 31.29 kg/m2          Blood Pressure from Last 3 Encounters:   10/10/17 120/81   09/25/17 119/80   09/12/17 122/72    Weight from Last 3 Encounters:   10/10/17 165 lb 9.6 oz (75.1 kg)   09/25/17 166 lb 8 oz (75.5 kg)   09/15/17 168 lb 9.6 oz (76.5 kg)              Today, you had the following     No orders found for display         Today's Medication Changes          These changes are accurate as of: 10/10/17  2:24 PM.  If you have any questions, ask your nurse or doctor.               These medicines have changed or have updated prescriptions.        Dose/Directions    cyanocobalamin 1000 MCG tablet   Commonly known as:  vitamin  B-12   This may have changed:  when to take this   Used for:  CKD (chronic kidney disease) stage 5, GFR less than 15 ml/min (H)        Dose:  1000 mcg   Take 1 tablet by mouth daily.   Quantity:  30 tablet   Refills:  0       econazole nitrate 1 % cream   This may have changed:    - when to take this  - reasons to take this   Used for:  Type II or unspecified type diabetes mellitus with neurological manifestations, not stated as uncontrolled(250.60) (H), Dermatophytosis of foot        Apply topically daily   Quantity:  85 g   Refills:  3                Primary Care Provider    Horacio Sheridan MD       [place]WB  [place] [address]        Equal Access to Services     [facility] AH: Jennifer muellero Sohenriali, waaxda luqadaha, qaybta kaalmada adeegyada, ty fam. So [facility] [phone].    ATENCIÓN: Si habla español, tiene a goetz disposición servicios gratuitos de asistencia lingüística. Llame al [phone].    We comply with applicable federal civil rights laws and [place] laws. We do not discriminate on the basis of race, color, national origin, age, disability, sex, sexual orientation, or gender identity.            Thank you!     Thank you for choosing [facility]  for your care. Our goal is always to provide you with excellent care. Hearing back from our patients is  one way we can continue to improve our services. Please take a few minutes to complete the written survey that you may receive in the mail after your visit with us. Thank you!             Your Updated Medication List - Protect others around you: Learn how to safely use, store and throw away your medicines at www.disposemymeds.org.          This list is accurate as of: 10/10/17  2:24 PM.  Always use your most recent med list.                   Brand Name Dispense Instructions for use Diagnosis    acetylcysteine 600 MG Caps capsule    N-ACETYL CYSTEINE    30 capsule    Take 2 400 mg caps two times daily for total daily dose of 800 mg        albuterol 108 (90 BASE) MCG/ACT Inhaler    PROAIR HFA/PROVENTIL HFA/VENTOLIN HFA    1 Inhaler    Inhale 2 puffs into the lungs every 6 hours as needed for shortness of breath / dyspnea or wheezing    Exercise-induced asthma       ARIPiprazole 2 MG tablet    ABILIFY    15 tablet    Take 0.5 tablets (1 mg) by mouth daily    Major depressive disorder, recurrent episode, moderate (H)       aspirin 81 MG EC tablet     30 tablet    Take 1 tablet (81 mg) by mouth daily    Kidney replaced by transplant       BENADRYL 25 MG capsule   Generic drug:  diphenhydrAMINE      Take 25 mg by mouth At Bedtime.        blood glucose monitoring lancets     1 Box    Use to test blood sugar 2 times daily or as directed.    Type 2 diabetes mellitus with peripheral neuropathy (H)       * blood glucose monitoring meter device kit    no brand specified    1 kit    Use to test blood sugar 2 times daily or as directed.    Type 2 diabetes mellitus with peripheral neuropathy (H)       * blood glucose monitoring meter device kit           blood glucose monitoring test strip    no brand specified    100 strip    Use to test blood sugars 2 times daily or as directed    Type 2 diabetes mellitus with peripheral neuropathy (H)       cyanocobalamin 1000 MCG tablet    vitamin  B-12    30 tablet    Take 1 tablet by mouth  daily.    CKD (chronic kidney disease) stage 5, GFR less than 15 ml/min (H)       cycloSPORINE modified 25 MG capsule    GENERIC EQUIVALENT    300 capsule    Take 5 capsules (125 mg) by mouth 2 times daily    Kidney replaced by transplant       econazole nitrate 1 % cream     85 g    Apply topically daily    Type II or unspecified type diabetes mellitus with neurological manifestations, not stated as uncontrolled(250.60) (H), Dermatophytosis of foot       furosemide 20 MG tablet    LASIX    90 tablet    Take 1 tablet (20 mg) by mouth daily    Generalized edema       latanoprost 0.005 % ophthalmic solution    XALATAN    2.5 mL    Place 1 drop into both eyes At Bedtime    Mild stage glaucoma(365.71)       metFORMIN 500 MG 24 hr tablet    GLUCOPHAGE-XR    90 tablet    Take 3 tablets (1,500 mg) by mouth daily (with dinner)    Type 2 diabetes mellitus with diabetic polyneuropathy, without long-term current use of insulin (H)       mycophenolate 250 MG capsule    GENERIC EQUIVALENT    240 capsule    Take 4 capsules (1,000 mg) by mouth 2 times daily    Kidney transplanted       omeprazole 40 MG capsule    priLOSEC    90 capsule    Take 1 capsule (40 mg) by mouth daily    Gastroesophageal reflux disease without esophagitis       ondansetron 4 MG ODT tab    ZOFRAN-ODT    20 tablet    Take 1 tablet (4 mg) by mouth every 6 hours as needed    Chronic kidney disease, stage V (H)       order for DME     1 Units    Walker with front wheels and a seat.    Fall, initial encounter       prazosin 5 MG capsule    MINIPRESS    90 capsule    Take 3 capsules (15 mg) by mouth At Bedtime    Nightmares associated with chronic post-traumatic stress disorder       REFRESH OP      Apply to eye as needed Both eyes        replens Gel     35 g    Use vaginally as needed. Can use up to 3 times per week.    Vaginal dryness       simvastatin 20 MG tablet    ZOCOR    90 tablet    Take 1 tablet (20 mg) by mouth At Bedtime    Chronic kidney disease,  stage V (H)       sulfamethoxazole-trimethoprim 400-80 MG per tablet    BACTRIM/SEPTRA    90 tablet    Take 1 tablet by mouth daily    Immunosuppression (H)       topiramate 200 MG tablet    TOPAMAX    60 tablet    Take 1 tablet (200 mg) by mouth 2 times daily    Morbid obesity due to excess calories (H)       TYLENOL 325 MG tablet   Generic drug:  acetaminophen      Take 325-650 mg by mouth as needed        vilazodone 40 MG Tabs tablet    VIIBRYD    30 tablet    Take 1 tablet (40 mg) by mouth daily    Major depressive disorder, recurrent episode, moderate (H)       vitamin D 1000 UNITS capsule     30 capsule    2,000 Units        * Notice:  This list has 2 medication(s) that are the same as other medications prescribed for you. Read the directions carefully, and ask your doctor or other care provider to review them with you.
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
## 2017-10-13 ENCOUNTER — ALLIED HEALTH/NURSE VISIT (OUTPATIENT)
Dept: SURGERY | Facility: CLINIC | Age: 62
End: 2017-10-13

## 2017-10-13 VITALS — WEIGHT: 166.6 LBS | BODY MASS INDEX: 31.45 KG/M2 | HEIGHT: 61 IN

## 2017-10-13 DIAGNOSIS — Z48.298 AFTERCARE FOLLOWING ORGAN TRANSPLANT: ICD-10-CM

## 2017-10-13 DIAGNOSIS — Z94.0 KIDNEY REPLACED BY TRANSPLANT: ICD-10-CM

## 2017-10-13 DIAGNOSIS — Z79.899 ENCOUNTER FOR LONG-TERM CURRENT USE OF MEDICATION: ICD-10-CM

## 2017-10-13 LAB
ANION GAP SERPL CALCULATED.3IONS-SCNC: 8 MMOL/L (ref 3–14)
BUN SERPL-MCNC: 15 MG/DL (ref 7–30)
CALCIUM SERPL-MCNC: 8.7 MG/DL (ref 8.5–10.1)
CHLORIDE SERPL-SCNC: 109 MMOL/L (ref 94–109)
CO2 SERPL-SCNC: 23 MMOL/L (ref 20–32)
CREAT SERPL-MCNC: 0.87 MG/DL (ref 0.52–1.04)
CYCLOSPORINE BLD LC/MS/MS-MCNC: 105 UG/L (ref 50–400)
ERYTHROCYTE [DISTWIDTH] IN BLOOD BY AUTOMATED COUNT: 14.6 % (ref 10–15)
GFR SERPL CREATININE-BSD FRML MDRD: 66 ML/MIN/1.7M2
GLUCOSE SERPL-MCNC: 139 MG/DL (ref 70–99)
HCT VFR BLD AUTO: 40.9 % (ref 35–47)
HGB BLD-MCNC: 13 G/DL (ref 11.7–15.7)
MCH RBC QN AUTO: 27.2 PG (ref 26.5–33)
MCHC RBC AUTO-ENTMCNC: 31.8 G/DL (ref 31.5–36.5)
MCV RBC AUTO: 86 FL (ref 78–100)
PLATELET # BLD AUTO: 169 10E9/L (ref 150–450)
POTASSIUM SERPL-SCNC: 3.7 MMOL/L (ref 3.4–5.3)
RBC # BLD AUTO: 4.78 10E12/L (ref 3.8–5.2)
SODIUM SERPL-SCNC: 140 MMOL/L (ref 133–144)
TME LAST DOSE: NORMAL H
WBC # BLD AUTO: 3.5 10E9/L (ref 4–11)

## 2017-10-13 NOTE — PATIENT INSTRUCTIONS
Goals:    1. Avoid grazing through, Eat 3 meals with lean protein at each meal, along with a non-starchy vegetable or whole fruit, up to 1/2 c carb at a meal.   -Try hard-boiled eggs to put on your salad or to have later in the day to make up for skipping breakfast.     2. If needing a snack, have vegetables (give at least 2 hours between a meals and a snack).  3. Restart exercise routine with walker (at least 1 time/week walking for 15 min/day slowly increase by 5 minutes a day to a goal of at least 30 minutes or Try a gentle group class)  4. Check vitamin and mineral labs at next doctor appointment (vitamin A, Vitamin D, zinc, iron, B12, B1, calcium, PTH).

## 2017-10-13 NOTE — MR AVS SNAPSHOT
MRN:[id number]                      After Visit Summary   10/13/2017    Elizabeth Palma    MRN: [id number]           Visit Information        Provider Department      10/13/2017 9:30 AM Francesca Danielle RD [facility]        Your next 10 appointments already scheduled     Nov 10, 2017 10:00 AM CST   (Arrive by 9:45 AM)   PRE-OP with Horacio Sheridan MD   [facility] ([facility])    [address] [place] [id number] [phone]            Nov 14, 2017  1:00 PM CST   Adult Med Follow UP with Chaparrita Hatch MD   [facility] ([facility])    [address]  [facility] [id number] [phone]            Nov 17, 2017  9:00 AM CST   LAB with  LAB   [facility] ([facility])    [address] [place] [id number] [phone]           Patient must bring picture ID. Patient should be prepared to give a urine specimen  Please do not eat 10-12 hours before your appointment if you are coming in fasting for labs on lipids, cholesterol, or glucose (sugar). Pregnant women should follow their Care Team instructions. Water with medications is okay. Do not drink coffee or other fluids. If you have concerns about taking  your medications, please ask at office or if scheduling via [employer], send a message by clicking on Secure Messaging, Message Your Care Team.            Nov 17, 2017  9:30 AM CST   NUTRITION VISIT with Francesca Danielle RD   [facility] ([facility])    [address] [place] [id number]   [phone]            Nov 20, 2017 10:45 AM CST   (Arrive by 10:30 AM)   Return Visit with Prakash Irene MD   [facility] Medical [facility] ([facility])    [address] [place] [address]  [place] [address]   [phone]            Nov 20, 2017  1:00 PM CST   Return Visit with Javier Tuttle DPM   [facility] ([facility])    [address] [place] [id number] [phone]            Nov 30, 2017   Procedure with Marciano hCisholm MD   [facility] [place] ([facility])    [address] [place] [address] [phone]           The [facility] is located on the corner of [facility] [facility] [address] [facility] [address] and [facility] on the [facility]. It is easily accessible from virtually any point in the [facility] area, via [address] and [address].            Dec 22, 2017  9:00 AM CST   LAB with  LAB   [facility] ([facility])    [address] [place] [phone]           Patient must bring picture ID. Patient should be prepared to give a urine specimen  Please do not eat 10-12 hours before your appointment if you are coming in fasting for labs on lipids, cholesterol, or glucose (sugar). Pregnant women should follow their Care Team instructions. Water with medications is okay. Do not drink coffee or other fluids. If you have concerns about taking  your medications, please ask at office or if scheduling via [employer], send a message by clicking on Secure Messaging, Message Your Care Team.            Dec 22, 2017  9:30 AM CST   NUTRITION VISIT with Francesca Danielle RD   [facility] ([facility])    [address] [place] [id number] [phone]            Jan 08, 2018  9:30 AM CST   (Arrive by 9:15 AM)   Return Visit with Horacio Sheridan MD   [facility] ([facility] [place])    [address] [place] [phone] [phone]              Care Instructions    Goals:    1. Avoid grazing  through, Eat 3 meals with lean protein at each meal, along with a non-starchy vegetable or whole fruit, up to 1/2 c carb at a meal.   -Try hard-boiled eggs to put on your salad or to have later in the day to make up for skipping breakfast.     2. If needing a snack, have vegetables (give at least 2 hours between a meals and a snack).  3. Restart exercise routine with walker (at least 1 time/week walking for 15 min/day slowly increase by 5 minutes a day to a goal of at least 30 minutes or Try a gentle group class)  4. Check vitamin and mineral labs at next doctor appointment (vitamin A, Vitamin D, zinc, iron, B12, B1, calcium, PTH).         [employer] Information     [employer] gives you secure access to your electronic health record. If you see a primary care provider, you can also send messages to your care team and make appointments. If you have questions, please call your primary care clinic.  If you do not have a primary care provider, please call [phone] and they will assist you.      [employer] is an electronic gateway that provides easy, online access to your medical records. With [employer], you can request a clinic appointment, read your test results, renew a prescription or communicate with your care team.     To access your existing account, please contact your [facility] or call [phone] for assistance.        Care EveryWhere ID     This is your Care EveryWhere ID. This could be used by other organizations to access your [facility] medical records  [id number]        Equal Access to Services     LINNEA HIDALGO : Hadii riky richter Somartín, waaxda luernieadaha, qaybta kaalmada lor, ty fam. So [facility] [phone].    ATENCIÓN: Si habla español, tiene a goetz disposición servicios gratuitos de asistencia lingüística. Llmarilyn al [phone].    We comply with applicable federal civil rights laws and [place] laws. We do not discriminate on the basis  of race, color, national origin, age, disability, sex, sexual orientation, or gender identity.

## 2017-10-13 NOTE — PROGRESS NOTES
"Nutrition Reassessment  Reason For Visit:  Elizabeth Palma is a 62 year-old female with type 2 DM (oral med management) presenting today for nutrition follow-up, s/p \"gastric bypass in 1990 at [facility]\" per Pt report.  She is seeing medical weight management.  She was referred by Dr. Irene.  Note: Pt had a kidney transplant on March 20, 2014.    Pt was accompanied by her .     Anthropometrics:  Height: 61\"  Pre-op Weight: 265 lbs per Pt report; lowest weight after bariatric surgery: 165-170 lbs (25 years ago)  (Pt reported that she initially was at 215 lbs in 2015 prior to seeing writer.)    Current Weight: 166.6 lbs (-2 lbs over the past month) with BMI of 31.54.    Current Vitamins/Minerals: 3000 International Units vitamin D/day, Calcium, vitamin C, vitamin B12 daily, B-complex  *Pt stated that she was told not to take other vitamins/minerals by MD.    Nutrition History:  Lactose intolerance ever since bariatric surgery.  Pt stated that she has osteoporosis; however, she stated that her doctors don't want her to take any vitamins or minerals due to her kidney transplant.    Diet/Nutrition Intake Hx: Pt reports her intake varies due to fluctuating appetite. Per pt there are days when she eats 3 meals but on other days she may not eat much at all or nibble on something through out the day. Pt also states her intake is affected by nausea 2/2 her anti-rejection medications. However pt reports she tries to make healthy choices (lower in calorie). Pt is also limited in protein choices that she likes - pt reports she does not take much dairy, does not like beans and lentils, keeps kosher. Advised pt to try to time her meals and eat every 4 hours instead of grazing but pt refused stating she would rather not eat at all.     *Pt stated that she will not record food intake due to the fact that every time she does this, she simply does not eat as she doesn't want to record.  She stated that her therapist strongly " advises against recording food intake for this reason.      Physical Activity Note: Pt reports she has a tendency to break bones so she is limited with what exercises she does. Pt states there is a long hallway in the building that she needs to walk when going out. Pt states she does not walk for exercises but walks only to get ADLs done.      Progress with Previous Goals:    1. Avoid grazing through, Eat 3 meals with lean protein at each meal, along with a non-starchy vegetable or whole fruit, up to 1/2 c carb at a meal. - Pt reported that she has been vomiting once-twice/week and she feels more tired/nauseated with loose, oily stools after medication change of anti-rejection drug.  Pt has Zofran to help at home if needed. She is focusing on eggs for protein since it goes down well.  Writer concerned about potential for deficient vitamin/mineral labs in light of her reported symptoms.   -Try hard-boiled eggs to put on your salad or to have later in the day to make up for skipping breakfast.     2. If needing a snack, have vegetables (give at least 2 hours between a meals and a snack). -She is including vegetables, but hasn't had much in the way of snacks due to nausea.   3. Restart exercise routine with walker (at least 1 time/week walking for 15 min/day slowly increase by 5 minutes a day to a goal of at least 30 minutes or Try a gentle group class) - Pt no longer needs the walker per her report.  She is walking several times/week now for 15-20 min.     Nutrition Prescription:  Grams Protein: 60 (minimum)  Amount of Fluid: 48-64 oz    Nutrition Diagnosis  Previous: Obesity related to excessive energy intake as evidenced by BMI > 30.- continues    Intervention  Intervention At Appointment:  Materials/Education provided:  Praised Pt for weight loss, discussing importance of continuing with the goals.  Discussed symptoms, strongly encouraging her to have vitamin/mineral labs drawn.  Suggested following up with  bariatric surgeon/PA; however, Pt prefers to work through PCP for now (she sees quite a large number of providers already).  Discussed meal planning and optimizing protein.  Encouraged exercise.  Gave encouragement and support to continue.       Patient Understanding: good  Expected Compliance: good    Goals:    1. Avoid grazing through, Eat 3 meals with lean protein at each meal, along with a non-starchy vegetable or whole fruit, up to 1/2 c carb at a meal.   -Try hard-boiled eggs to put on your salad or to have later in the day to make up for skipping breakfast.     2. If needing a snack, have vegetables (give at least 2 hours between a meals and a snack).  3. Restart exercise routine with walker (at least 1 time/week walking for 15 min/day slowly increase by 5 minutes a day to a goal of at least 30 minutes or Try a gentle group class)  4. Check vitamin and mineral labs at next doctor appointment (vitamin A, Vitamin D, zinc, iron, B12, B1, calcium, PTH).    Follow-Up: 1 month    Time spent with patient: 30 minutes.  Francesca Danielle, MS, RDN, LDN, CLT  Pager: [phone]

## 2017-10-19 ENCOUNTER — TELEPHONE (OUTPATIENT)
Dept: PSYCHIATRY | Facility: CLINIC | Age: 62
End: 2017-10-19

## 2017-11-08 ENCOUNTER — TRANSFERRED RECORDS (OUTPATIENT)
Dept: HEALTH INFORMATION MANAGEMENT | Facility: CLINIC | Age: 62
End: 2017-11-08

## 2017-11-10 ENCOUNTER — OFFICE VISIT (OUTPATIENT)
Dept: INTERNAL MEDICINE | Facility: CLINIC | Age: 62
End: 2017-11-10

## 2017-11-10 VITALS
SYSTOLIC BLOOD PRESSURE: 120 MMHG | WEIGHT: 167.1 LBS | DIASTOLIC BLOOD PRESSURE: 80 MMHG | BODY MASS INDEX: 31.57 KG/M2 | RESPIRATION RATE: 16 BRPM | HEART RATE: 72 BPM

## 2017-11-10 DIAGNOSIS — Z12.31 VISIT FOR SCREENING MAMMOGRAM: ICD-10-CM

## 2017-11-10 DIAGNOSIS — E11.42 TYPE 2 DIABETES MELLITUS WITH DIABETIC POLYNEUROPATHY, WITHOUT LONG-TERM CURRENT USE OF INSULIN (H): ICD-10-CM

## 2017-11-10 DIAGNOSIS — Z01.818 PRE-OPERATIVE EXAMINATION: Primary | ICD-10-CM

## 2017-11-10 RX ORDER — METFORMIN HCL 500 MG
1500 TABLET, EXTENDED RELEASE 24 HR ORAL
Qty: 90 TABLET | Refills: 11 | Status: SHIPPED | OUTPATIENT
Start: 2017-11-10 | End: 2018-11-02

## 2017-11-10 ASSESSMENT — PAIN SCALES - GENERAL: PAINLEVEL: SEVERE PAIN (7)

## 2017-11-10 NOTE — LETTER
11/10/2017      RE: Elizabeth Palma  [address]   [facility] [address]       Rooming Note    Pre-Operative Information  Surgery: Colonoscopy  Date of Surgery: 17  Surgeon: Marciano Chester MD  Fax: [phone]    Health Maintenance  Health Maintenance Due   Topic Date Due     SKIN CANCER SCREENING Q1 YR (NO IN BASKET)  1955     DEPRESSION ACTION PLAN Q1 YR  1975     ADVANCE DIRECTIVE PLANNING Q5 YRS  2012     PAP Q3 YR  2016     COLONOSCOPY Q5 YR  2017     MICROALBUMIN Q1 YEAR  2017     MAMMO Q1 YR  2017     A1C Q6 MO  2017     All health maintenance items discussed and pended.    Kristen Perry CMA at 10:05 AM on 11/10/2017    Elizabeth Palma is 62 year old female here at the request of Dr. Chisholm for cardiovascular, pulmonary, and perioperative risk assessment prior to surgery.The intended surgical procedure is sedated colonoscopy A copy of this note will be sent to the surgeon.    A/P:  Elizabeth Palma with a history of   Patient Active Problem List    Diagnosis Date Noted     Type 2 diabetes mellitus with peripheral neuropathy (H) 2015     Priority: High     -donor kidney transplant 2014     Priority: High     Major depressive disorder, recurrent episode, moderate (H) 11/15/2012     Priority: High     Autosomal dominant polycystic kidney disease 2011     Priority: High     (Problem list name updated by automated process. Provider to review and confirm.)       OP (osteoporosis) T score -3.8 2009     Priority: High     2007 T-score -3.7       Age-related osteoporosis without current pathological fracture 08/10/2016     Priority: Medium     Right knee pain 2016     Priority: Medium     Left knee pain 2016     Priority: Medium     Posttraumatic stress disorder 2015     Priority: Medium     Nightmares associated with chronic post-traumatic stress disorder 10/27/2015     Priority: Medium      Pain in joint involving ankle and foot 07/13/2015     Priority: Medium     Senile osteoporosis 05/29/2015     Priority: Medium     Hyperparathyroidism (H) 02/26/2015     Priority: Medium     Problem list name updated by automated process. Provider to review       Hyperparathyroidism, secondary (H) 02/25/2015     Priority: Medium     Immunosuppressed status (H) 03/20/2014     Priority: Medium     Pain in joint, forearm -- L unhealed Fx 05/21/2013     Priority: Medium     CMC DJD(carpometacarpal degenerative joint disease), localized primary 03/05/2013     Priority: Medium     Generalized anxiety disorder 11/15/2012     Priority: Medium     LION on CPAP 10/15/2012     Priority: Medium     Premature menopause age 37 07/10/2012     Priority: Medium     OCP (vaginal bldg)-->HT which she stopped 2 mo later documented at Jan 12, 2007 visit (age 51).       Sensory loss 10/17/2011     Priority: Medium     Bottom of feet; uncertain if there is a neuropathy per notes.        Hypertension 10/15/2011     Priority: Medium     Hyperlipidemia 10/15/2011     Priority: Medium     Abnormal MRI, cervical spine 10/15/2011     Priority: Medium     4/2011; mild changes noted. Study done for left arm symptoms  Impression:   1. Mild multilevel degenerative disc disease with no significant canal  or neural stenosis seen. motion artifact on the STIR images in these  are not interpretable. The remaining images were interpreted       presents prior to surgery for assessment of perioperative risk. The patient is at LOW risk for cardiovascular complications and at LOW risk for pulmonary complications of this LOW risk surgery.    The proposed surgical procedure is considered LOW risk.    REVISED CARDIAC RISK INDEX  The patient has the following serious cardiovascular risks for perioperative complications such as (MI, PE, VFib and 3  AV Block):  No serious cardiac risks  INTERPRETATION: 0 risks: Class I (very low risk - 0.4% complication  rate)    Recommend that she proceed with proposed procedure, without further diagnostic evaluation    She can take all of her meds the morning of the procedure.    Laboratory studies:  None    Please contact our office if there are any further questions or information required about this patient.    Horacio Sheridan MD    --------------------------------------------------------    HPI:   Reason for surgery:  She will have a routine screening colonoscopy which needs to be under sedation.  She thinks the sedation is needed because of the diabetes (?).  No problems with preparation and complications afterwards in other places.    Cardiovascular Risk:  This patient does not have chest pain with ambulation or walking up a flight of stairs.  There is not any chest pain with exercise.  There is not a history of heart disease or valve problems.    Pulmonary Risk:  The patient does not have a history of lung problems.    Perioperative Complications:  The patient does not have a history of bleeding or clotting problems in the past. The patient has not had complications from past surgeries.  The patient does not have a family history of any anesthesia - her sister  from anesthesia for a heart valve replacement - she says her heart stopped.    ROS:  Constitutional: no fevers, night sweats  Eyes: no vision change, diplopia or red eyes   Ears, Nose, Mouth, Throat: no tinnitus or hearing change, no epistaxis or nasal discharge, no oral lesions, throat clear   Cardiovascular: no chest pain, palpitations, or pain with walking, no orthopnea or PND   Respiratory: no dyspnea, cough, shortness of breath or wheezing   GI: no nausea, vomiting, diarrhea or constipation, no abdominal pain   : no change in urine, no dysuria or hematuria  Musculoskeletal: she broke her left wrist over the summer and has to surgery in January  Neuro: no loss of strength or sensation, no numbness or tingling, no tremor, no dizziness, no headache    Heme/Lymph: no concerning bumps, no bleeding problems   Allergy: she has environmental allergies for which she uses an inhaler  Psych: no depression or anxiety, no sleep problems    Family History   Problem Relation Age of Onset     MENTAL ILLNESS Other      family hx     HEART DISEASE Other      DIABETES Other      CANCER Other      Glaucoma No family hx of      Macular Degeneration No family hx of      Social History     Social History     Marital status:      Spouse name: Rahat     Number of children: 2     Years of education: N/A     Occupational History           part-time     Social History Main Topics     Smoking status: Former Smoker     Smokeless tobacco: Never Used     Alcohol use No     Drug use: No     Sexual activity: Yes     Partners: Male     Birth control/ protection: Post-menopausal      Comment: 1 partner     Other Topics Concern     Not on file     Social History Narrative     Patient Active Problem List   Diagnosis     Autosomal dominant polycystic kidney disease     Hypertension     Hyperlipidemia     Abnormal MRI, cervical spine     Sensory loss     Premature menopause age 37     OP (osteoporosis) T score -3.8     LION on CPAP     Major depressive disorder, recurrent episode, moderate (H)     Generalized anxiety disorder     CMC DJD(carpometacarpal degenerative joint disease), localized primary     Pain in joint, forearm -- L unhealed Fx     -donor kidney transplant     Immunosuppressed status (H)     Hyperparathyroidism, secondary (H)     Hyperparathyroidism (H)     Senile osteoporosis     Pain in joint involving ankle and foot     Nightmares associated with chronic post-traumatic stress disorder     Type 2 diabetes mellitus with peripheral neuropathy (H)     Posttraumatic stress disorder     Right knee pain     Left knee pain     Age-related osteoporosis without current pathological fracture     Past Surgical History:   Procedure Laterality Date     ANKLE SURGERY       C  TRANSPLANTATION OF KIDNEY  3/2014     C/SECTION, LOW TRANSVERSE      x 2     CHOLECYSTECTOMY  1990     ESOPHAGOSCOPY, GASTROSCOPY, DUODENOSCOPY (EGD), COMBINED N/A 5/19/2015    Procedure: COMBINED ESOPHAGOSCOPY, GASTROSCOPY, DUODENOSCOPY (EGD);  Surgeon: Sky Davey MD;  Location: [facility]     ESOPHAGOSCOPY, GASTROSCOPY, DUODENOSCOPY (EGD), COMBINED N/A 5/19/2015    Procedure: COMBINED ESOPHAGOSCOPY, GASTROSCOPY, DUODENOSCOPY (EGD), BIOPSY SINGLE OR MULTIPLE;  Surgeon: Sky Davey MD;  Location: [facility]     LAPAROSCOPY, SURGICAL; REPAIR INCISIONAL OR VENTRAL HERNIA       HERMINIA EN Y BOWEL  1990     WRIST SURGERY       Current Outpatient Prescriptions   Medication     cycloSPORINE modified (GENERIC EQUIVALENT) 25 MG capsule     topiramate (TOPAMAX) 200 MG tablet     albuterol (PROAIR HFA/PROVENTIL HFA/VENTOLIN HFA) 108 (90 BASE) MCG/ACT Inhaler     order for DME     vilazodone (VIIBRYD) 40 MG TABS tablet     prazosin (MINIPRESS) 5 MG capsule     ARIPiprazole (ABILIFY) 2 MG tablet     Vaginal Lubricant (REPLENS) GEL     furosemide (LASIX) 20 MG tablet     blood glucose monitoring (ACCU-CHEK RALPH PLUS) meter device kit     omeprazole (PRILOSEC) 40 MG capsule     simvastatin (ZOCOR) 20 MG tablet     Polyvinyl Alcohol-Povidone (REFRESH OP)     latanoprost (XALATAN) 0.005 % ophthalmic solution     blood glucose monitoring (NO BRAND SPECIFIED) meter device kit     blood glucose monitoring (NO BRAND SPECIFIED) test strip     blood glucose monitoring (SOFTCLIX) lancets     sulfamethoxazole-trimethoprim (BACTRIM/SEPTRA) 400-80 MG per tablet     mycophenolate (CELLCEPT - GENERIC EQUIVALENT) 250 MG capsule     metFORMIN (GLUCOPHAGE-XR) 500 MG 24 hr tablet     acetylcysteine (N-ACETYL-L-CYSTEINE) 600 MG CAPS capsule     ondansetron (ZOFRAN-ODT) 4 MG disintegrating tablet     Cholecalciferol (VITAMIN D) 1000 UNITS capsule     econazole nitrate 1 % cream     aspirin EC 81 MG EC tablet     acetaminophen (TYLENOL) 325  MG tablet     cyanocolbalamin (VITAMIN  B-12) 1000 MCG tablet     diphenhydrAMINE (BENADRYL) 25 MG capsule     No current facility-administered medications for this visit.      Immunization History   Administered Date(s) Administered     HepB 02/04/2010, 03/17/2010, 08/09/2010     Influenza (H1N1) 11/24/2009     Influenza (IIV3) 09/01/2008, 10/26/2011, 09/15/2012, 10/01/2013, 10/01/2014, 10/01/2015     Influenza Vaccine IM 3yrs+ 4 Valent IIV4 09/25/2016     Influenza Vaccine, 3 YRS +, IM (QUADRIVALENT W/PRESERVATIVES) 09/29/2017     Mantoux 02/15/2010     Pneumococcal 23 valent 11/25/2008     TDAP Vaccine (Boostrix) 10/12/2012     Tetanus 04/05/2005       PHYSICAL EXAM:  /80  Pulse 72  Resp 16  Wt 75.8 kg (167 lb 1.6 oz)  Breastfeeding? No  BMI 31.57 kg/m2    Wt Readings from Last 1 Encounters:   11/10/17 75.8 kg (167 lb 1.6 oz)     Constitutional: no distress, comfortable, pleasant   Eyes: anicteric, normal extra-ocular movements   Ears, Nose and Throat: throat clear, neck supple with full range of motion  Cardiovascular: regular rate and rhythm, normal S1 and S2, soft systolic murmurs  Respiratory: clear to auscultation, no wheezes or crackles, normal breath sounds   Gastrointestinal: positive bowel sounds, nontender, no hepatosplenomegaly, no masses   Musculoskeletal: full range of motion, no edema   Skin: no concerning lesions, no jaundice   Neurological: cranial nerves intact, normal strength and sensation, reflexes at patella and biceps normal, normal gait, no tremor   Psychological: appropriate mood   Lymphatic: no cervical lymphadenopathy    EKG:  EKG was not indicated based on risk assessment.            Horacio Sheridan MD

## 2017-11-10 NOTE — LETTER
11/10/2017       RE: Elizabeth Palma  [address]     [place] [address]     Dear Colleague,    Thank you for referring your patient, Elizabeth Palma, to the [facility] at [facility]. Please see a copy of my visit note below.    No notes on file    Again, thank you for allowing me to participate in the care of your patient.      Sincerely,    Horacio Sheridan MD

## 2017-11-10 NOTE — LETTER
11/10/2017    RE: Elizabeth Palma  [address]     [facility] [address]     Rooming Note    Pre-Operative Information  Surgery: Colonoscopy  Date of Surgery: 17  Surgeon: Marciano Chester MD  Fax: [phone]    Health Maintenance  Health Maintenance Due   Topic Date Due     SKIN CANCER SCREENING Q1 YR (NO IN BASKET)  1955     DEPRESSION ACTION PLAN Q1 YR  1975     ADVANCE DIRECTIVE PLANNING Q5 YRS  2012     PAP Q3 YR  2016     COLONOSCOPY Q5 YR  2017     MICROALBUMIN Q1 YEAR  2017     MAMMO Q1 YR  2017     A1C Q6 MO  2017     All health maintenance items discussed and pended.    Kristen Perry CMA at 10:05 AM on 11/10/2017    Elizabeth Palma is 62 year old female here at the request of Dr. Chisholm for cardiovascular, pulmonary, and perioperative risk assessment prior to surgery.The intended surgical procedure is sedated colonoscopy A copy of this note will be sent to the surgeon.    A/P:  Elizabeth Palma with a history of   Patient Active Problem List    Diagnosis Date Noted     Type 2 diabetes mellitus with peripheral neuropathy (H) 2015     Priority: High     -donor kidney transplant 2014     Priority: High     Major depressive disorder, recurrent episode, moderate (H) 11/15/2012     Priority: High     Autosomal dominant polycystic kidney disease 2011     Priority: High     (Problem list name updated by automated process. Provider to review and confirm.)       OP (osteoporosis) T score -3.8 2009     Priority: High     2007 T-score -3.7       Age-related osteoporosis without current pathological fracture 08/10/2016     Priority: Medium     Right knee pain 2016     Priority: Medium     Left knee pain 2016     Priority: Medium     Posttraumatic stress disorder 2015     Priority: Medium     Nightmares associated with chronic post-traumatic stress disorder 10/27/2015     Priority: Medium      Pain in joint involving ankle and foot 07/13/2015     Priority: Medium     Senile osteoporosis 05/29/2015     Priority: Medium     Hyperparathyroidism (H) 02/26/2015     Priority: Medium     Problem list name updated by automated process. Provider to review       Hyperparathyroidism, secondary (H) 02/25/2015     Priority: Medium     Immunosuppressed status (H) 03/20/2014     Priority: Medium     Pain in joint, forearm -- L unhealed Fx 05/21/2013     Priority: Medium     CMC DJD(carpometacarpal degenerative joint disease), localized primary 03/05/2013     Priority: Medium     Generalized anxiety disorder 11/15/2012     Priority: Medium     LION on CPAP 10/15/2012     Priority: Medium     Premature menopause age 37 07/10/2012     Priority: Medium     OCP (vaginal bldg)-->HT which she stopped 2 mo later documented at Jan 12, 2007 visit (age 51).       Sensory loss 10/17/2011     Priority: Medium     Bottom of feet; uncertain if there is a neuropathy per notes.        Hypertension 10/15/2011     Priority: Medium     Hyperlipidemia 10/15/2011     Priority: Medium     Abnormal MRI, cervical spine 10/15/2011     Priority: Medium     4/2011; mild changes noted. Study done for left arm symptoms  Impression:   1. Mild multilevel degenerative disc disease with no significant canal  or neural stenosis seen. motion artifact on the STIR images in these  are not interpretable. The remaining images were interpreted       presents prior to surgery for assessment of perioperative risk. The patient is at LOW risk for cardiovascular complications and at LOW risk for pulmonary complications of this LOW risk surgery.    The proposed surgical procedure is considered LOW risk.    REVISED CARDIAC RISK INDEX  The patient has the following serious cardiovascular risks for perioperative complications such as (MI, PE, VFib and 3  AV Block):  No serious cardiac risks  INTERPRETATION: 0 risks: Class I (very low risk - 0.4% complication  rate)    Recommend that she proceed with proposed procedure, without further diagnostic evaluation    She can take all of her meds the morning of the procedure.    Laboratory studies:  {LABS LIST- NO NOTES:652380}    Please contact our office if there are any further questions or information required about this patient.    Horacio Sheridan MD    --------------------------------------------------------    HPI:   Reason for surgery:  She will have a routine screening colonoscopy which needs to be under sedation.  She thinks the sedation is needed because of the diabetes (?).  No problems with preparation and complications afterwards.      Cardiovascular Risk:  This patient does not have chest pain with ambulation or walking up a flight of stairs.  There is not any chest pain with exercise.  There is not a history of heart disease or valve problems.    Pulmonary Risk:  The patient does not have a history of lung problems.    Perioperative Complications:  The patient does not have a history of bleeding or clotting problems in the past. The patient has not had complications from past surgeries.  The patient does not have a family history of any anesthesia - her sister  from anesthesia for a heart valve replacement - she says her heart stopped.    ROS:  Constitutional: no fevers, night sweats  Eyes: no vision change, diplopia or red eyes   Ears, Nose, Mouth, Throat: no tinnitus or hearing change, no epistaxis or nasal discharge, no oral lesions, throat clear   Cardiovascular: no chest pain, palpitations, or pain with walking, no orthopnea or PND   Respiratory: no dyspnea, cough, shortness of breath or wheezing   GI: no nausea, vomiting, diarrhea or constipation, no abdominal pain   : no change in urine, no dysuria or hematuria  Musculoskeletal: she broke her left wrist over the summer and has to surgery in January  Neuro: no loss of strength or sensation, no numbness or tingling, no tremor, no dizziness, no headache    Heme/Lymph: no concerning bumps, no bleeding problems   Allergy: she has environmental allergies for which she uses an inhaler  Psych: no depression or anxiety, no sleep problems    Family History   Problem Relation Age of Onset     MENTAL ILLNESS Other      family hx     HEART DISEASE Other      DIABETES Other      CANCER Other      Glaucoma No family hx of      Macular Degeneration No family hx of      Social History     Social History     Marital status:      Spouse name: Rahat     Number of children: 2     Years of education: N/A     Occupational History           part-time     Social History Main Topics     Smoking status: Former Smoker     Smokeless tobacco: Never Used     Alcohol use No     Drug use: No     Sexual activity: Yes     Partners: Male     Birth control/ protection: Post-menopausal      Comment: 1 partner     Other Topics Concern     Not on file     Social History Narrative     Patient Active Problem List   Diagnosis     Autosomal dominant polycystic kidney disease     Hypertension     Hyperlipidemia     Abnormal MRI, cervical spine     Sensory loss     Premature menopause age 37     OP (osteoporosis) T score -3.8     LION on CPAP     Major depressive disorder, recurrent episode, moderate (H)     Generalized anxiety disorder     CMC DJD(carpometacarpal degenerative joint disease), localized primary     Pain in joint, forearm -- L unhealed Fx     -donor kidney transplant     Immunosuppressed status (H)     Hyperparathyroidism, secondary (H)     Hyperparathyroidism (H)     Senile osteoporosis     Pain in joint involving ankle and foot     Nightmares associated with chronic post-traumatic stress disorder     Type 2 diabetes mellitus with peripheral neuropathy (H)     Posttraumatic stress disorder     Right knee pain     Left knee pain     Age-related osteoporosis without current pathological fracture     Past Surgical History:   Procedure Laterality Date     ANKLE SURGERY       C  TRANSPLANTATION OF KIDNEY  3/2014     C/SECTION, LOW TRANSVERSE      x 2     CHOLECYSTECTOMY  1990     ESOPHAGOSCOPY, GASTROSCOPY, DUODENOSCOPY (EGD), COMBINED N/A 5/19/2015    Procedure: COMBINED ESOPHAGOSCOPY, GASTROSCOPY, DUODENOSCOPY (EGD);  Surgeon: Sky Davey MD;  Location: [facility]     ESOPHAGOSCOPY, GASTROSCOPY, DUODENOSCOPY (EGD), COMBINED N/A 5/19/2015    Procedure: COMBINED ESOPHAGOSCOPY, GASTROSCOPY, DUODENOSCOPY (EGD), BIOPSY SINGLE OR MULTIPLE;  Surgeon: Sky Davey MD;  Location: [facility]     LAPAROSCOPY, SURGICAL; REPAIR INCISIONAL OR VENTRAL HERNIA       HERMINIA EN Y BOWEL  1990     WRIST SURGERY       Current Outpatient Prescriptions   Medication     cycloSPORINE modified (GENERIC EQUIVALENT) 25 MG capsule     topiramate (TOPAMAX) 200 MG tablet     albuterol (PROAIR HFA/PROVENTIL HFA/VENTOLIN HFA) 108 (90 BASE) MCG/ACT Inhaler     order for DME     vilazodone (VIIBRYD) 40 MG TABS tablet     prazosin (MINIPRESS) 5 MG capsule     ARIPiprazole (ABILIFY) 2 MG tablet     Vaginal Lubricant (REPLENS) GEL     furosemide (LASIX) 20 MG tablet     blood glucose monitoring (ACCU-CHEK RALPH PLUS) meter device kit     omeprazole (PRILOSEC) 40 MG capsule     simvastatin (ZOCOR) 20 MG tablet     Polyvinyl Alcohol-Povidone (REFRESH OP)     latanoprost (XALATAN) 0.005 % ophthalmic solution     blood glucose monitoring (NO BRAND SPECIFIED) meter device kit     blood glucose monitoring (NO BRAND SPECIFIED) test strip     blood glucose monitoring (SOFTCLIX) lancets     sulfamethoxazole-trimethoprim (BACTRIM/SEPTRA) 400-80 MG per tablet     mycophenolate (CELLCEPT - GENERIC EQUIVALENT) 250 MG capsule     metFORMIN (GLUCOPHAGE-XR) 500 MG 24 hr tablet     acetylcysteine (N-ACETYL-L-CYSTEINE) 600 MG CAPS capsule     ondansetron (ZOFRAN-ODT) 4 MG disintegrating tablet     Cholecalciferol (VITAMIN D) 1000 UNITS capsule     econazole nitrate 1 % cream     aspirin EC 81 MG EC tablet     acetaminophen (TYLENOL) 325  MG tablet     cyanocolbalamin (VITAMIN  B-12) 1000 MCG tablet     diphenhydrAMINE (BENADRYL) 25 MG capsule     No current facility-administered medications for this visit.      Immunization History   Administered Date(s) Administered     HepB 02/04/2010, 03/17/2010, 08/09/2010     Influenza (H1N1) 11/24/2009     Influenza (IIV3) 09/01/2008, 10/26/2011, 09/15/2012, 10/01/2013, 10/01/2014, 10/01/2015     Influenza Vaccine IM 3yrs+ 4 Valent IIV4 09/25/2016     Influenza Vaccine, 3 YRS +, IM (QUADRIVALENT W/PRESERVATIVES) 09/29/2017     Mantoux 02/15/2010     Pneumococcal 23 valent 11/25/2008     TDAP Vaccine (Boostrix) 10/12/2012     Tetanus 04/05/2005       PHYSICAL EXAM:  /80  Pulse 72  Resp 16  Wt 75.8 kg (167 lb 1.6 oz)  Breastfeeding? No  BMI 31.57 kg/m2    Wt Readings from Last 1 Encounters:   11/10/17 75.8 kg (167 lb 1.6 oz)     Constitutional: no distress, comfortable, pleasant   Eyes: anicteric, normal extra-ocular movements   Ears, Nose and Throat: throat clear, neck supple with full range of motion  Cardiovascular: regular rate and rhythm, normal S1 and S2, no murmurs, rubs or gallops, peripheral pulses full and symmetric   Respiratory: clear to auscultation, no wheezes or crackles, normal breath sounds   Gastrointestinal: positive bowel sounds, nontender, no hepatosplenomegaly, no masses   Musculoskeletal: full range of motion, no edema   Skin: no concerning lesions, no jaundice   Neurological: cranial nerves intact, normal strength and sensation, reflexes at patella and biceps normal, normal gait, no tremor   Psychological: appropriate mood   Lymphatic: no cervical, axillary or inguinal lymphadenopathy    EKG:  EKG {WAS / WAS NO:270848} indicated based on risk assessment.  {EKG CLINICAL IMPRESSION:096809900}        Horacio Sheridan MD

## 2017-11-10 NOTE — LETTER
11/10/2017      RE: Elizabeth Palma  [address]   [facility] [address]       Rooming Note    Pre-Operative Information  Surgery: Colonoscopy  Date of Surgery: 17  Surgeon: Marciano Chester MD  Fax: [phone]    Health Maintenance  Health Maintenance Due   Topic Date Due     SKIN CANCER SCREENING Q1 YR (NO IN BASKET)  1955     DEPRESSION ACTION PLAN Q1 YR  1975     ADVANCE DIRECTIVE PLANNING Q5 YRS  2012     PAP Q3 YR  2016     COLONOSCOPY Q5 YR  2017     MICROALBUMIN Q1 YEAR  2017     MAMMO Q1 YR  2017     A1C Q6 MO  2017     All health maintenance items discussed and pended.    Kristen Perry CMA at 10:05 AM on 11/10/2017    Elizabeth Palma is 62 year old female here at the request of Dr. Chisholm for cardiovascular, pulmonary, and perioperative risk assessment prior to surgery.The intended surgical procedure is sedated colonoscopy A copy of this note will be sent to the surgeon.    A/P:  Elizabeth Palma with a history of   Patient Active Problem List    Diagnosis Date Noted     Type 2 diabetes mellitus with peripheral neuropathy (H) 2015     Priority: High     -donor kidney transplant 2014     Priority: High     Major depressive disorder, recurrent episode, moderate (H) 11/15/2012     Priority: High     Autosomal dominant polycystic kidney disease 2011     Priority: High     (Problem list name updated by automated process. Provider to review and confirm.)       OP (osteoporosis) T score -3.8 2009     Priority: High     2007 T-score -3.7       Age-related osteoporosis without current pathological fracture 08/10/2016     Priority: Medium     Right knee pain 2016     Priority: Medium     Left knee pain 2016     Priority: Medium     Posttraumatic stress disorder 2015     Priority: Medium     Nightmares associated with chronic post-traumatic stress disorder 10/27/2015     Priority: Medium      Pain in joint involving ankle and foot 07/13/2015     Priority: Medium     Senile osteoporosis 05/29/2015     Priority: Medium     Hyperparathyroidism (H) 02/26/2015     Priority: Medium     Problem list name updated by automated process. Provider to review       Hyperparathyroidism, secondary (H) 02/25/2015     Priority: Medium     Immunosuppressed status (H) 03/20/2014     Priority: Medium     Pain in joint, forearm -- L unhealed Fx 05/21/2013     Priority: Medium     CMC DJD(carpometacarpal degenerative joint disease), localized primary 03/05/2013     Priority: Medium     Generalized anxiety disorder 11/15/2012     Priority: Medium     LION on CPAP 10/15/2012     Priority: Medium     Premature menopause age 37 07/10/2012     Priority: Medium     OCP (vaginal bldg)-->HT which she stopped 2 mo later documented at Jan 12, 2007 visit (age 51).       Sensory loss 10/17/2011     Priority: Medium     Bottom of feet; uncertain if there is a neuropathy per notes.        Hypertension 10/15/2011     Priority: Medium     Hyperlipidemia 10/15/2011     Priority: Medium     Abnormal MRI, cervical spine 10/15/2011     Priority: Medium     4/2011; mild changes noted. Study done for left arm symptoms  Impression:   1. Mild multilevel degenerative disc disease with no significant canal  or neural stenosis seen. motion artifact on the STIR images in these  are not interpretable. The remaining images were interpreted       presents prior to surgery for assessment of perioperative risk. The patient is at LOW risk for cardiovascular complications and at LOW risk for pulmonary complications of this LOW risk surgery.    The proposed surgical procedure is considered LOW risk.    REVISED CARDIAC RISK INDEX  The patient has the following serious cardiovascular risks for perioperative complications such as (MI, PE, VFib and 3  AV Block):  No serious cardiac risks  INTERPRETATION: 0 risks: Class I (very low risk - 0.4% complication  rate)    Recommend that she proceed with proposed procedure, without further diagnostic evaluation    She can take all of her meds the morning of the procedure.    Laboratory studies:  None    Please contact our office if there are any further questions or information required about this patient.    Horacio Sheridan MD    --------------------------------------------------------    HPI:   Reason for surgery:  She will have a routine screening colonoscopy which needs to be under sedation.  She thinks the sedation is needed because of the diabetes (?).  No problems with preparation and complications afterwards in other places.    Cardiovascular Risk:  This patient does not have chest pain with ambulation or walking up a flight of stairs.  There is not any chest pain with exercise.  There is not a history of heart disease or valve problems.    Pulmonary Risk:  The patient does not have a history of lung problems.    Perioperative Complications:  The patient does not have a history of bleeding or clotting problems in the past. The patient has not had complications from past surgeries.  The patient does not have a family history of any anesthesia - her sister  from anesthesia for a heart valve replacement - she says her heart stopped.    ROS:  Constitutional: no fevers, night sweats  Eyes: no vision change, diplopia or red eyes   Ears, Nose, Mouth, Throat: no tinnitus or hearing change, no epistaxis or nasal discharge, no oral lesions, throat clear   Cardiovascular: no chest pain, palpitations, or pain with walking, no orthopnea or PND   Respiratory: no dyspnea, cough, shortness of breath or wheezing   GI: no nausea, vomiting, diarrhea or constipation, no abdominal pain   : no change in urine, no dysuria or hematuria  Musculoskeletal: she broke her left wrist over the summer and has to surgery in January  Neuro: no loss of strength or sensation, no numbness or tingling, no tremor, no dizziness, no headache    Heme/Lymph: no concerning bumps, no bleeding problems   Allergy: she has environmental allergies for which she uses an inhaler  Psych: no depression or anxiety, no sleep problems    Family History   Problem Relation Age of Onset     MENTAL ILLNESS Other      family hx     HEART DISEASE Other      DIABETES Other      CANCER Other      Glaucoma No family hx of      Macular Degeneration No family hx of      Social History     Social History     Marital status:      Spouse name: Rahat     Number of children: 2     Years of education: N/A     Occupational History           part-time     Social History Main Topics     Smoking status: Former Smoker     Smokeless tobacco: Never Used     Alcohol use No     Drug use: No     Sexual activity: Yes     Partners: Male     Birth control/ protection: Post-menopausal      Comment: 1 partner     Other Topics Concern     Not on file     Social History Narrative     Patient Active Problem List   Diagnosis     Autosomal dominant polycystic kidney disease     Hypertension     Hyperlipidemia     Abnormal MRI, cervical spine     Sensory loss     Premature menopause age 37     OP (osteoporosis) T score -3.8     LION on CPAP     Major depressive disorder, recurrent episode, moderate (H)     Generalized anxiety disorder     CMC DJD(carpometacarpal degenerative joint disease), localized primary     Pain in joint, forearm -- L unhealed Fx     -donor kidney transplant     Immunosuppressed status (H)     Hyperparathyroidism, secondary (H)     Hyperparathyroidism (H)     Senile osteoporosis     Pain in joint involving ankle and foot     Nightmares associated with chronic post-traumatic stress disorder     Type 2 diabetes mellitus with peripheral neuropathy (H)     Posttraumatic stress disorder     Right knee pain     Left knee pain     Age-related osteoporosis without current pathological fracture     Past Surgical History:   Procedure Laterality Date     ANKLE SURGERY       C  TRANSPLANTATION OF KIDNEY  3/2014     C/SECTION, LOW TRANSVERSE      x 2     CHOLECYSTECTOMY  1990     ESOPHAGOSCOPY, GASTROSCOPY, DUODENOSCOPY (EGD), COMBINED N/A 5/19/2015    Procedure: COMBINED ESOPHAGOSCOPY, GASTROSCOPY, DUODENOSCOPY (EGD);  Surgeon: Sky Davey MD;  Location: [facility]     ESOPHAGOSCOPY, GASTROSCOPY, DUODENOSCOPY (EGD), COMBINED N/A 5/19/2015    Procedure: COMBINED ESOPHAGOSCOPY, GASTROSCOPY, DUODENOSCOPY (EGD), BIOPSY SINGLE OR MULTIPLE;  Surgeon: Sky Davey MD;  Location: [facility]     LAPAROSCOPY, SURGICAL; REPAIR INCISIONAL OR VENTRAL HERNIA       HERMINIA EN Y BOWEL  1990     WRIST SURGERY       Current Outpatient Prescriptions   Medication     cycloSPORINE modified (GENERIC EQUIVALENT) 25 MG capsule     topiramate (TOPAMAX) 200 MG tablet     albuterol (PROAIR HFA/PROVENTIL HFA/VENTOLIN HFA) 108 (90 BASE) MCG/ACT Inhaler     order for DME     vilazodone (VIIBRYD) 40 MG TABS tablet     prazosin (MINIPRESS) 5 MG capsule     ARIPiprazole (ABILIFY) 2 MG tablet     Vaginal Lubricant (REPLENS) GEL     furosemide (LASIX) 20 MG tablet     blood glucose monitoring (ACCU-CHEK RALPH PLUS) meter device kit     omeprazole (PRILOSEC) 40 MG capsule     simvastatin (ZOCOR) 20 MG tablet     Polyvinyl Alcohol-Povidone (REFRESH OP)     latanoprost (XALATAN) 0.005 % ophthalmic solution     blood glucose monitoring (NO BRAND SPECIFIED) meter device kit     blood glucose monitoring (NO BRAND SPECIFIED) test strip     blood glucose monitoring (SOFTCLIX) lancets     sulfamethoxazole-trimethoprim (BACTRIM/SEPTRA) 400-80 MG per tablet     mycophenolate (CELLCEPT - GENERIC EQUIVALENT) 250 MG capsule     metFORMIN (GLUCOPHAGE-XR) 500 MG 24 hr tablet     acetylcysteine (N-ACETYL-L-CYSTEINE) 600 MG CAPS capsule     ondansetron (ZOFRAN-ODT) 4 MG disintegrating tablet     Cholecalciferol (VITAMIN D) 1000 UNITS capsule     econazole nitrate 1 % cream     aspirin EC 81 MG EC tablet     acetaminophen (TYLENOL) 325  MG tablet     cyanocolbalamin (VITAMIN  B-12) 1000 MCG tablet     diphenhydrAMINE (BENADRYL) 25 MG capsule     No current facility-administered medications for this visit.      Immunization History   Administered Date(s) Administered     HepB 02/04/2010, 03/17/2010, 08/09/2010     Influenza (H1N1) 11/24/2009     Influenza (IIV3) 09/01/2008, 10/26/2011, 09/15/2012, 10/01/2013, 10/01/2014, 10/01/2015     Influenza Vaccine IM 3yrs+ 4 Valent IIV4 09/25/2016     Influenza Vaccine, 3 YRS +, IM (QUADRIVALENT W/PRESERVATIVES) 09/29/2017     Mantoux 02/15/2010     Pneumococcal 23 valent 11/25/2008     TDAP Vaccine (Boostrix) 10/12/2012     Tetanus 04/05/2005       PHYSICAL EXAM:  /80  Pulse 72  Resp 16  Wt 75.8 kg (167 lb 1.6 oz)  Breastfeeding? No  BMI 31.57 kg/m2    Wt Readings from Last 1 Encounters:   11/10/17 75.8 kg (167 lb 1.6 oz)     Constitutional: no distress, comfortable, pleasant   Eyes: anicteric, normal extra-ocular movements   Ears, Nose and Throat: throat clear, neck supple with full range of motion  Cardiovascular: regular rate and rhythm, normal S1 and S2, soft systolic murmurs  Respiratory: clear to auscultation, no wheezes or crackles, normal breath sounds   Gastrointestinal: positive bowel sounds, nontender, no hepatosplenomegaly, no masses   Musculoskeletal: full range of motion, no edema   Skin: no concerning lesions, no jaundice   Neurological: cranial nerves intact, normal strength and sensation, reflexes at patella and biceps normal, normal gait, no tremor   Psychological: appropriate mood   Lymphatic: no cervical lymphadenopathy    EKG:  EKG was not indicated based on risk assessment.                  Horacio Sheridan MD

## 2017-11-10 NOTE — PATIENT INSTRUCTIONS
[facility] Phone Number [phone]  [facility] Medication Refill Request Information:  * Please contact your pharmacy regarding ANY request for medication refills.  ** [facility] Prescription Fax = [phone]  * Please allow 3 business days for routine medication refills.  * Please allow 5 business days for controlled substance medication refills.     [facility] Test Result notification information:  *You will be notified with in 7-10 days of your appointment day regarding the results of your test.  If you are on MyChart you will be notified as soon as the provider has reviewed the results and signed off on them.

## 2017-11-10 NOTE — LETTER
11/10/2017     RE: Elizabeth Palma  [id number]     [facility] [address]     Dear Colleague,    Thank you for referring your patient, Elizabeth Palma, to the [facility] at [facility]. Please see a copy of my visit note below.    Rooming Note    Pre-Operative Information  Surgery: Colonoscopy  Date of Surgery: 17  Surgeon: Marciano Chester MD  Fax: [phone]    Health Maintenance  Health Maintenance Due   Topic Date Due     SKIN CANCER SCREENING Q1 YR (NO IN BASKET)  1957     DEPRESSION ACTION PLAN Q1 YR  1975     ADVANCE DIRECTIVE PLANNING Q5 YRS  2012     PAP Q3 YR  2016     COLONOSCOPY Q5 YR  2017     MICROALBUMIN Q1 YEAR  2017     MAMMO Q1 YR  2017     A1C Q6 MO  2017     All health maintenance items discussed and pended.    Kristen Perry CMA at 10:05 AM on 11/10/2017    Elizabeth Palma is 62 year old female here at the request of Dr. Chisholm for cardiovascular, pulmonary, and perioperative risk assessment prior to surgery.The intended surgical procedure is sedated colonoscopy A copy of this note will be sent to the surgeon.    A/P:  Elizabeth Palma with a history of   Patient Active Problem List    Diagnosis Date Noted     Type 2 diabetes mellitus with peripheral neuropathy (H) 2015     Priority: High     -donor kidney transplant 2014     Priority: High     Major depressive disorder, recurrent episode, moderate (H) 11/15/2012     Priority: High     Autosomal dominant polycystic kidney disease 2011     Priority: High     (Problem list name updated by automated process. Provider to review and confirm.)       OP (osteoporosis) T score -3.8 2009     Priority: High     2007 T-score -3.7       Age-related osteoporosis without current pathological fracture 08/10/2016     Priority: Medium     Right knee pain 2016     Priority: Medium     Left  knee pain 02/08/2016     Priority: Medium     Posttraumatic stress disorder 11/24/2015     Priority: Medium     Nightmares associated with chronic post-traumatic stress disorder 10/27/2015     Priority: Medium     Pain in joint involving ankle and foot 07/13/2015     Priority: Medium     Senile osteoporosis 05/29/2015     Priority: Medium     Hyperparathyroidism (H) 02/26/2015     Priority: Medium     Problem list name updated by automated process. Provider to review       Hyperparathyroidism, secondary (H) 02/25/2015     Priority: Medium     Immunosuppressed status (H) 03/20/2014     Priority: Medium     Pain in joint, forearm -- L unhealed Fx 05/21/2013     Priority: Medium     CMC DJD(carpometacarpal degenerative joint disease), localized primary 03/05/2013     Priority: Medium     Generalized anxiety disorder 11/15/2012     Priority: Medium     LION on CPAP 10/15/2012     Priority: Medium     Premature menopause age 37 07/10/2012     Priority: Medium     OCP (vaginal bldg)-->HT which she stopped 2 mo later documented at Jan 12, 2007 visit (age 51).       Sensory loss 10/17/2011     Priority: Medium     Bottom of feet; uncertain if there is a neuropathy per notes.        Hypertension 10/15/2011     Priority: Medium     Hyperlipidemia 10/15/2011     Priority: Medium     Abnormal MRI, cervical spine 10/15/2011     Priority: Medium     4/2011; mild changes noted. Study done for left arm symptoms  Impression:   1. Mild multilevel degenerative disc disease with no significant canal  or neural stenosis seen. motion artifact on the STIR images in these  are not interpretable. The remaining images were interpreted       presents prior to surgery for assessment of perioperative risk. The patient is at LOW risk for cardiovascular complications and at LOW risk for pulmonary complications of this LOW risk surgery.    The proposed surgical procedure is considered LOW risk.    REVISED CARDIAC RISK INDEX  The patient has the  following serious cardiovascular risks for perioperative complications such as (MI, PE, VFib and 3  AV Block):  No serious cardiac risks  INTERPRETATION: 0 risks: Class I (very low risk - 0.4% complication rate)    Recommend that she proceed with proposed procedure, without further diagnostic evaluation    She can take all of her meds the morning of the procedure.    Laboratory studies:  {LABS LIST- NO NOTES:339924}    Please contact our office if there are any further questions or information required about this patient.    Horacio Sheridan MD    --------------------------------------------------------    HPI:   Reason for surgery:  She will have a routine screening colonoscopy which needs to be under sedation.  She thinks the sedation is needed because of the diabetes (?).  No problems with preparation and complications afterwards.      Cardiovascular Risk:  This patient does not have chest pain with ambulation or walking up a flight of stairs.  There is not any chest pain with exercise.  There is not a history of heart disease or valve problems.    Pulmonary Risk:  The patient does not have a history of lung problems.    Perioperative Complications:  The patient does not have a history of bleeding or clotting problems in the past. The patient has not had complications from past surgeries.  The patient does not have a family history of any anesthesia - her sister  from anesthesia for a heart valve replacement - she says her heart stopped.    ROS:  Constitutional: no fevers, night sweats  Eyes: no vision change, diplopia or red eyes   Ears, Nose, Mouth, Throat: no tinnitus or hearing change, no epistaxis or nasal discharge, no oral lesions, throat clear   Cardiovascular: no chest pain, palpitations, or pain with walking, no orthopnea or PND   Respiratory: no dyspnea, cough, shortness of breath or wheezing   GI: no nausea, vomiting, diarrhea or constipation, no abdominal pain   : no change in urine, no dysuria  or hematuria  Musculoskeletal: she broke her left wrist over the summer and has to surgery in January  Neuro: no loss of strength or sensation, no numbness or tingling, no tremor, no dizziness, no headache   Heme/Lymph: no concerning bumps, no bleeding problems   Allergy: she has environmental allergies for which she uses an inhaler  Psych: no depression or anxiety, no sleep problems    Family History   Problem Relation Age of Onset     MENTAL ILLNESS Other      family hx     HEART DISEASE Other      DIABETES Other      CANCER Other      Glaucoma No family hx of      Macular Degeneration No family hx of      Social History     Social History     Marital status:      Spouse name: Rahat     Number of children: 2     Years of education: N/A     Occupational History           part-time     Social History Main Topics     Smoking status: Former Smoker     Smokeless tobacco: Never Used     Alcohol use No     Drug use: No     Sexual activity: Yes     Partners: Male     Birth control/ protection: Post-menopausal      Comment: 1 partner     Other Topics Concern     Not on file     Social History Narrative     Patient Active Problem List   Diagnosis     Autosomal dominant polycystic kidney disease     Hypertension     Hyperlipidemia     Abnormal MRI, cervical spine     Sensory loss     Premature menopause age 37     OP (osteoporosis) T score -3.8     LION on CPAP     Major depressive disorder, recurrent episode, moderate (H)     Generalized anxiety disorder     CMC DJD(carpometacarpal degenerative joint disease), localized primary     Pain in joint, forearm -- L unhealed Fx     -donor kidney transplant     Immunosuppressed status (H)     Hyperparathyroidism, secondary (H)     Hyperparathyroidism (H)     Senile osteoporosis     Pain in joint involving ankle and foot     Nightmares associated with chronic post-traumatic stress disorder     Type 2 diabetes mellitus with peripheral neuropathy (H)     Posttraumatic  stress disorder     Right knee pain     Left knee pain     Age-related osteoporosis without current pathological fracture     Past Surgical History:   Procedure Laterality Date     ANKLE SURGERY       C TRANSPLANTATION OF KIDNEY  3/2014     C/SECTION, LOW TRANSVERSE      x 2     CHOLECYSTECTOMY  1990     ESOPHAGOSCOPY, GASTROSCOPY, DUODENOSCOPY (EGD), COMBINED N/A 5/19/2015    Procedure: COMBINED ESOPHAGOSCOPY, GASTROSCOPY, DUODENOSCOPY (EGD);  Surgeon: Sky Davey MD;  Location: [facility]     ESOPHAGOSCOPY, GASTROSCOPY, DUODENOSCOPY (EGD), COMBINED N/A 5/19/2015    Procedure: COMBINED ESOPHAGOSCOPY, GASTROSCOPY, DUODENOSCOPY (EGD), BIOPSY SINGLE OR MULTIPLE;  Surgeon: Sky Davey MD;  Location: [facility]     LAPAROSCOPY, SURGICAL; REPAIR INCISIONAL OR VENTRAL HERNIA       HERMINIA EN Y BOWEL  1990     WRIST SURGERY       Current Outpatient Prescriptions   Medication     cycloSPORINE modified (GENERIC EQUIVALENT) 25 MG capsule     topiramate (TOPAMAX) 200 MG tablet     albuterol (PROAIR HFA/PROVENTIL HFA/VENTOLIN HFA) 108 (90 BASE) MCG/ACT Inhaler     order for DME     vilazodone (VIIBRYD) 40 MG TABS tablet     prazosin (MINIPRESS) 5 MG capsule     ARIPiprazole (ABILIFY) 2 MG tablet     Vaginal Lubricant (REPLENS) GEL     furosemide (LASIX) 20 MG tablet     blood glucose monitoring (ACCU-CHEK RALPH PLUS) meter device kit     omeprazole (PRILOSEC) 40 MG capsule     simvastatin (ZOCOR) 20 MG tablet     Polyvinyl Alcohol-Povidone (REFRESH OP)     latanoprost (XALATAN) 0.005 % ophthalmic solution     blood glucose monitoring (NO BRAND SPECIFIED) meter device kit     blood glucose monitoring (NO BRAND SPECIFIED) test strip     blood glucose monitoring (SOFTCLIX) lancets     sulfamethoxazole-trimethoprim (BACTRIM/SEPTRA) 400-80 MG per tablet     mycophenolate (CELLCEPT - GENERIC EQUIVALENT) 250 MG capsule     metFORMIN (GLUCOPHAGE-XR) 500 MG 24 hr tablet     acetylcysteine (N-ACETYL-L-CYSTEINE) 600 MG CAPS capsule      ondansetron (ZOFRAN-ODT) 4 MG disintegrating tablet     Cholecalciferol (VITAMIN D) 1000 UNITS capsule     econazole nitrate 1 % cream     aspirin EC 81 MG EC tablet     acetaminophen (TYLENOL) 325 MG tablet     cyanocolbalamin (VITAMIN  B-12) 1000 MCG tablet     diphenhydrAMINE (BENADRYL) 25 MG capsule     No current facility-administered medications for this visit.      Immunization History   Administered Date(s) Administered     HepB 02/04/2010, 03/17/2010, 08/09/2010     Influenza (H1N1) 11/24/2009     Influenza (IIV3) 09/01/2008, 10/26/2011, 09/15/2012, 10/01/2013, 10/01/2014, 10/01/2015     Influenza Vaccine IM 3yrs+ 4 Valent IIV4 09/25/2016     Influenza Vaccine, 3 YRS +, IM (QUADRIVALENT W/PRESERVATIVES) 09/29/2017     Mantoux 02/15/2010     Pneumococcal 23 valent 11/25/2008     TDAP Vaccine (Boostrix) 10/12/2012     Tetanus 04/05/2005       PHYSICAL EXAM:  /80  Pulse 72  Resp 16  Wt 75.8 kg (167 lb 1.6 oz)  Breastfeeding? No  BMI 31.57 kg/m2    Wt Readings from Last 1 Encounters:   11/10/17 75.8 kg (167 lb 1.6 oz)     Constitutional: no distress, comfortable, pleasant   Eyes: anicteric, normal extra-ocular movements   Ears, Nose and Throat: throat clear, neck supple with full range of motion  Cardiovascular: regular rate and rhythm, normal S1 and S2, no murmurs, rubs or gallops, peripheral pulses full and symmetric   Respiratory: clear to auscultation, no wheezes or crackles, normal breath sounds   Gastrointestinal: positive bowel sounds, nontender, no hepatosplenomegaly, no masses   Musculoskeletal: full range of motion, no edema   Skin: no concerning lesions, no jaundice   Neurological: cranial nerves intact, normal strength and sensation, reflexes at patella and biceps normal, normal gait, no tremor   Psychological: appropriate mood   Lymphatic: no cervical, axillary or inguinal lymphadenopathy    EKG:  EKG {WAS / WAS NO:562514} indicated based on risk assessment.  {EKG CLINICAL  IMPRESSION:515394855}    Again, thank you for allowing me to participate in the care of your patient.      Sincerely,    Horacio Sheridan MD

## 2017-11-10 NOTE — MR AVS SNAPSHOT
After Visit Summary   11/10/2017    Elizabeth Palma    MRN: [id number]           Patient Information     Date Of Birth          1955        Visit Information        Provider Department      11/10/2017 10:00 AM Horacio Sheridan MD [facility]        Today's Diagnoses     Visit for screening mammogram    -  1    Type 2 diabetes mellitus with diabetic polyneuropathy, without long-term current use of insulin (H)          Care Instructions    Primary Care Center Phone Number [phone]  Primary Care Center Medication Refill Request Information:  * Please contact your pharmacy regarding ANY request for medication refills.  ** [facility] Prescription Fax = [phone]  * Please allow 3 business days for routine medication refills.  * Please allow 5 business days for controlled substance medication refills.     Primary Care Center Test Result notification information:  *You will be notified with in 7-10 days of your appointment day regarding the results of your test.  If you are on MyChart you will be notified as soon as the provider has reviewed the results and signed off on them.                  Follow-ups after your visit        Your next 10 appointments already scheduled     Nov 14, 2017  1:00 PM CST   Adult Med Follow UP with Chaparrita Hatch MD   [facility] ([facility])    [address] [place] [address] [phone]            Nov 17, 2017  9:00 AM CST   LAB with  LAB    Health Lab ([facility])    [address] [id number] [phone]           Please do not eat 10-12 hours before your appointment if you are coming in fasting for labs on lipids, cholesterol, or glucose (sugar). This does not apply to pregnant women. Water, hot tea and black coffee (with nothing added) are okay. Do not drink other fluids, diet soda or chew gum.            Nov 17, 2017  9:30 AM CST    NUTRITION VISIT with Francesca Danielle RD   [facility] ([facility])    [address] [place] [address]   [phone]            Nov 20, 2017 10:45 AM CST   (Arrive by 10:30 AM)   Return Visit with Prakash Irene MD   [facility] ([facility])    [address] [place] [address]   [phone]            Nov 20, 2017  1:00 PM CST   Return Visit with Javier Tuttle DPM   [facility] ([facility])    [address] [place] [id number] [phone]            Nov 30, 2017   Procedure with Marciano Chisholm MD   [facility] [place] ([facility])    [address] [facility] [facility] [address] [phone]           The [facility] is located on the corner of [facility] [address] [facility] and [facility] [facility] on the [facility]. It is easily accessible from virtually any point in the [facility] area, via [address] and [address].            Dec 22, 2017  9:00 AM CST   LAB with  LAB    Health Lab ([facility])    [address] [place] [id number] [phone]           Please do not eat 10-12 hours before your appointment if you are coming in fasting for labs on lipids, cholesterol, or glucose (sugar). This does not apply to pregnant women. Water, hot tea and black coffee (with nothing added) are okay. Do not drink other fluids, diet soda or chew gum.            Dec 22, 2017  9:30 AM CST   NUTRITION VISIT with Francesca Danielle RD   [facility] ([facility])    [address] [place] [id number]   [phone]            Jan 08, 2018  9:30 AM CST   (Arrive by 9:15 AM)   Return Visit with Horacio Sheridan MD   [facility]  Primary Care Clinic ([facility])    [address] [facility]  4th [address]  [facility] [address] [phone]            Jan 19, 2018  9:00 AM CST   LAB with  LAB   [facility] ([facility])    [address]  [address] [facility] [id number] [phone]           Please do not eat 10-12 hours before your appointment if you are coming in fasting for labs on lipids, cholesterol, or glucose (sugar). This does not apply to pregnant women. Water, hot tea and black coffee (with nothing added) are okay. Do not drink other fluids, diet soda or chew gum.              Who to contact     Please call your clinic at [phone] to:    Ask questions about your health    Make or cancel appointments    Discuss your medicines    Learn about your test results    Speak to your doctor   If you have compliments or concerns about an experience at your clinic, or if you wish to file a complaint, please contact [facility] Patient Relations at [phone] or email us at [email or web address]         Additional Information About Your Visit        [employer] Information     [employer] gives you secure access to your electronic health record. If you see a primary care provider, you can also send messages to your care team and make appointments. If you have questions, please call your primary care clinic.  If you do not have a primary care provider, please call [phone] and they will assist you.      [employer] is an electronic gateway that provides easy, online access to your medical records. With [employer], you can request a clinic appointment, read your test results, renew a prescription or communicate with your care team.     To access your existing account, please contact your [facility] or call [phone] for assistance.        Care EveryWhere ID     This is your Care EveryWhere ID. This could be used by other  organizations to access your [facility] medical records  [id number]        Your Vitals Were     Pulse Respirations Breastfeeding? BMI (Body Mass Index)          72 16 No 31.57 kg/m2         Blood Pressure from Last 3 Encounters:   11/10/17 120/80   10/10/17 120/81   09/25/17 119/80    Weight from Last 3 Encounters:   11/10/17 75.8 kg (167 lb 1.6 oz)   10/13/17 75.6 kg (166 lb 9.6 oz)   10/10/17 75.1 kg (165 lb 9.6 oz)              Today, you had the following     No orders found for display         Today's Medication Changes          These changes are accurate as of: 11/10/17 10:33 AM.  If you have any questions, ask your nurse or doctor.               These medicines have changed or have updated prescriptions.        Dose/Directions    cyanocobalamin 1000 MCG tablet   Commonly known as:  vitamin  B-12   This may have changed:  when to take this   Used for:  CKD (chronic kidney disease) stage 5, GFR less than 15 ml/min (H)        Dose:  1000 mcg   Take 1 tablet by mouth daily.   Quantity:  30 tablet   Refills:  0       econazole nitrate 1 % cream   This may have changed:    - when to take this  - reasons to take this   Used for:  Type II or unspecified type diabetes mellitus with neurological manifestations, not stated as uncontrolled(250.60) (H), Dermatophytosis of foot        Apply topically daily   Quantity:  85 g   Refills:  3            Where to get your medicines      These medications [facility] - [place], [place] - [address] [facility] [address]  [address], [place] [address]    Hours:  24-hours Phone:  [phone]     metFORMIN 500 MG 24 hr tablet                Primary Care Provider    MD SUPRIYA Barnett [place] [id number]        Equal Access to Services     [facility] AH: Hadii riky richter Somartín, waaxda luqadaha, qaybta kaalmada adeegyada, ty fam. So [facility] [phone].    ATENCIÓN: Si habla español, tiene a goetz disposición  servicios gratuitos de asistencia lingüística. Rachel askew [phone].    We comply with applicable federal civil rights laws and [place] laws. We do not discriminate on the basis of race, color, national origin, age, disability, sex, sexual orientation, or gender identity.            Thank you!     Thank you for choosing [facility]  for your care. Our goal is always to provide you with excellent care. Hearing back from our patients is one way we can continue to improve our services. Please take a few minutes to complete the written survey that you may receive in the mail after your visit with us. Thank you!             Your Updated Medication List - Protect others around you: Learn how to safely use, store and throw away your medicines at www.disposemymeds.org.          This list is accurate as of: 11/10/17 10:33 AM.  Always use your most recent med list.                   Brand Name Dispense Instructions for use Diagnosis    acetylcysteine 600 MG Caps capsule    N-ACETYL CYSTEINE    30 capsule    Take 2 400 mg caps two times daily for total daily dose of 800 mg        albuterol 108 (90 BASE) MCG/ACT Inhaler    PROAIR HFA/PROVENTIL HFA/VENTOLIN HFA    1 Inhaler    Inhale 2 puffs into the lungs every 6 hours as needed for shortness of breath / dyspnea or wheezing    Exercise-induced asthma       ARIPiprazole 2 MG tablet    ABILIFY    15 tablet    Take 0.5 tablets (1 mg) by mouth daily    Major depressive disorder, recurrent episode, moderate (H)       aspirin 81 MG EC tablet     30 tablet    Take 1 tablet (81 mg) by mouth daily    Kidney replaced by transplant       BENADRYL 25 MG capsule   Generic drug:  diphenhydrAMINE      Take 25 mg by mouth At Bedtime.        blood glucose monitoring lancets     1 Box    Use to test blood sugar 2 times daily or as directed.    Type 2 diabetes mellitus with peripheral neuropathy (H)       * blood glucose monitoring meter device kit    no brand specified    1  kit    Use to test blood sugar 2 times daily or as directed.    Type 2 diabetes mellitus with peripheral neuropathy (H)       * blood glucose monitoring meter device kit           blood glucose monitoring test strip    no brand specified    100 strip    Use to test blood sugars 2 times daily or as directed    Type 2 diabetes mellitus with peripheral neuropathy (H)       cyanocobalamin 1000 MCG tablet    vitamin  B-12    30 tablet    Take 1 tablet by mouth daily.    CKD (chronic kidney disease) stage 5, GFR less than 15 ml/min (H)       cycloSPORINE modified 25 MG capsule    GENERIC EQUIVALENT    300 capsule    Take 5 capsules (125 mg) by mouth 2 times daily    Kidney replaced by transplant       econazole nitrate 1 % cream     85 g    Apply topically daily    Type II or unspecified type diabetes mellitus with neurological manifestations, not stated as uncontrolled(250.60) (H), Dermatophytosis of foot       furosemide 20 MG tablet    LASIX    90 tablet    Take 1 tablet (20 mg) by mouth daily    Generalized edema       latanoprost 0.005 % ophthalmic solution    XALATAN    2.5 mL    Place 1 drop into both eyes At Bedtime    Mild stage glaucoma(365.71)       metFORMIN 500 MG 24 hr tablet    GLUCOPHAGE-XR    90 tablet    Take 3 tablets (1,500 mg) by mouth daily (with dinner)    Type 2 diabetes mellitus with diabetic polyneuropathy, without long-term current use of insulin (H)       mycophenolate 250 MG capsule    GENERIC EQUIVALENT    240 capsule    Take 4 capsules (1,000 mg) by mouth 2 times daily    Kidney transplanted       omeprazole 40 MG capsule    priLOSEC    90 capsule    Take 1 capsule (40 mg) by mouth daily    Gastroesophageal reflux disease without esophagitis       ondansetron 4 MG ODT tab    ZOFRAN-ODT    20 tablet    Take 1 tablet (4 mg) by mouth every 6 hours as needed    Chronic kidney disease, stage V (H)       order for DME     1 Units    Walker with front wheels and a seat.    Fall, initial encounter        prazosin 5 MG capsule    MINIPRESS    90 capsule    Take 3 capsules (15 mg) by mouth At Bedtime    Nightmares associated with chronic post-traumatic stress disorder       REFRESH OP      Apply to eye as needed Both eyes        replens Gel     35 g    Use vaginally as needed. Can use up to 3 times per week.    Vaginal dryness       simvastatin 20 MG tablet    ZOCOR    90 tablet    Take 1 tablet (20 mg) by mouth At Bedtime    Chronic kidney disease, stage V (H)       sulfamethoxazole-trimethoprim 400-80 MG per tablet    BACTRIM/SEPTRA    90 tablet    Take 1 tablet by mouth daily    Immunosuppression (H)       topiramate 200 MG tablet    TOPAMAX    60 tablet    Take 1 tablet (200 mg) by mouth 2 times daily    Morbid obesity due to excess calories (H)       TYLENOL 325 MG tablet   Generic drug:  acetaminophen      Take 325-650 mg by mouth as needed        vilazodone 40 MG Tabs tablet    VIIBRYD    30 tablet    Take 1 tablet (40 mg) by mouth daily    Major depressive disorder, recurrent episode, moderate (H)       vitamin D 1000 UNITS capsule     30 capsule    2,000 Units        * Notice:  This list has 2 medication(s) that are the same as other medications prescribed for you. Read the directions carefully, and ask your doctor or other care provider to review them with you.

## 2017-11-10 NOTE — NURSING NOTE
Chief Complaint   Patient presents with     Pre-Op Exam     pt is here for a pre op exam       Kristen Perry CMA at 10:07 AM on 11/10/2017

## 2017-11-10 NOTE — LETTER
11/10/2017      RE: Elizabeth Emily Louie  [address]     [facility] [address]       No notes on file    Horacio Sheridan MD

## 2017-11-10 NOTE — PROGRESS NOTES
Elizabeth Palma is 62 year old female here at the request of Dr. Chisholm for cardiovascular, pulmonary, and perioperative risk assessment prior to surgery.The intended surgical procedure is sedated colonoscopy A copy of this note will be sent to the surgeon.    A/P:  Elizabeth Palma with a history of   Patient Active Problem List    Diagnosis Date Noted     Type 2 diabetes mellitus with peripheral neuropathy (H) 2015     Priority: High     -donor kidney transplant 2014     Priority: High     Major depressive disorder, recurrent episode, moderate (H) 11/15/2012     Priority: High     Autosomal dominant polycystic kidney disease 2011     Priority: High     (Problem list name updated by automated process. Provider to review and confirm.)       OP (osteoporosis) T score -3.8 2009     Priority: High     2007 T-score -3.7       Age-related osteoporosis without current pathological fracture 08/10/2016     Priority: Medium     Right knee pain 2016     Priority: Medium     Left knee pain 2016     Priority: Medium     Posttraumatic stress disorder 2015     Priority: Medium     Nightmares associated with chronic post-traumatic stress disorder 10/27/2015     Priority: Medium     Pain in joint involving ankle and foot 2015     Priority: Medium     Senile osteoporosis 2015     Priority: Medium     Hyperparathyroidism (H) 2015     Priority: Medium     Problem list name updated by automated process. Provider to review       Hyperparathyroidism, secondary (H) 2015     Priority: Medium     Immunosuppressed status (H) 2014     Priority: Medium     Pain in joint, forearm -- L unhealed Fx 2013     Priority: Medium     CMC DJD(carpometacarpal degenerative joint disease), localized primary 2013     Priority: Medium     Generalized anxiety disorder 11/15/2012     Priority: Medium     LION on CPAP 10/15/2012     Priority: Medium     Premature menopause  age 37 07/10/2012     Priority: Medium     OCP (vaginal bldg)-->HT which she stopped 2 mo later documented at Jan 12, 2007 visit (age 51).       Sensory loss 10/17/2011     Priority: Medium     Bottom of feet; uncertain if there is a neuropathy per notes.        Hypertension 10/15/2011     Priority: Medium     Hyperlipidemia 10/15/2011     Priority: Medium     Abnormal MRI, cervical spine 10/15/2011     Priority: Medium     4/2011; mild changes noted. Study done for left arm symptoms  Impression:   1. Mild multilevel degenerative disc disease with no significant canal  or neural stenosis seen. motion artifact on the STIR images in these  are not interpretable. The remaining images were interpreted       presents prior to surgery for assessment of perioperative risk. The patient is at LOW risk for cardiovascular complications and at LOW risk for pulmonary complications of this LOW risk surgery.    The proposed surgical procedure is considered LOW risk.    REVISED CARDIAC RISK INDEX  The patient has the following serious cardiovascular risks for perioperative complications such as (MI, PE, VFib and 3  AV Block):  No serious cardiac risks  INTERPRETATION: 0 risks: Class I (very low risk - 0.4% complication rate)    Recommend that she proceed with proposed procedure, without further diagnostic evaluation    She can take all of her meds the morning of the procedure.    Laboratory studies:  None    Please contact our office if there are any further questions or information required about this patient.    Horacio Sheridan MD    --------------------------------------------------------    HPI:   Reason for surgery:  She will have a routine screening colonoscopy which needs to be under sedation.  She thinks the sedation is needed because of the diabetes (?).  No problems with preparation and complications afterwards in other places.    Cardiovascular Risk:  This patient does not have chest pain with ambulation or walking up a  flight of stairs.  There is not any chest pain with exercise.  There is not a history of heart disease or valve problems.    Pulmonary Risk:  The patient does not have a history of lung problems.    Perioperative Complications:  The patient does not have a history of bleeding or clotting problems in the past. The patient has not had complications from past surgeries.  The patient does not have a family history of any anesthesia - her sister  from anesthesia for a heart valve replacement - she says her heart stopped.    ROS:  Constitutional: no fevers, night sweats  Eyes: no vision change, diplopia or red eyes   Ears, Nose, Mouth, Throat: no tinnitus or hearing change, no epistaxis or nasal discharge, no oral lesions, throat clear   Cardiovascular: no chest pain, palpitations, or pain with walking, no orthopnea or PND   Respiratory: no dyspnea, cough, shortness of breath or wheezing   GI: no nausea, vomiting, diarrhea or constipation, no abdominal pain   : no change in urine, no dysuria or hematuria  Musculoskeletal: she broke her left wrist over the summer and has to surgery in January  Neuro: no loss of strength or sensation, no numbness or tingling, no tremor, no dizziness, no headache   Heme/Lymph: no concerning bumps, no bleeding problems   Allergy: she has environmental allergies for which she uses an inhaler  Psych: no depression or anxiety, no sleep problems    Family History   Problem Relation Age of Onset     MENTAL ILLNESS Other      family hx     HEART DISEASE Other      DIABETES Other      CANCER Other      Glaucoma No family hx of      Macular Degeneration No family hx of      Social History     Social History     Marital status:      Spouse name: Rahat     Number of children: 2     Years of education: N/A     Occupational History           part-time     Social History Main Topics     Smoking status: Former Smoker     Smokeless tobacco: Never Used     Alcohol use No     Drug use: No      Sexual activity: Yes     Partners: Male     Birth control/ protection: Post-menopausal      Comment: 1 partner     Other Topics Concern     Not on file     Social History Narrative     Patient Active Problem List   Diagnosis     Autosomal dominant polycystic kidney disease     Hypertension     Hyperlipidemia     Abnormal MRI, cervical spine     Sensory loss     Premature menopause age 37     OP (osteoporosis) T score -3.8     LION on CPAP     Major depressive disorder, recurrent episode, moderate (H)     Generalized anxiety disorder     CMC DJD(carpometacarpal degenerative joint disease), localized primary     Pain in joint, forearm -- L unhealed Fx     -donor kidney transplant     Immunosuppressed status (H)     Hyperparathyroidism, secondary (H)     Hyperparathyroidism (H)     Senile osteoporosis     Pain in joint involving ankle and foot     Nightmares associated with chronic post-traumatic stress disorder     Type 2 diabetes mellitus with peripheral neuropathy (H)     Posttraumatic stress disorder     Right knee pain     Left knee pain     Age-related osteoporosis without current pathological fracture     Past Surgical History:   Procedure Laterality Date     ANKLE SURGERY       C TRANSPLANTATION OF KIDNEY  3/2014     C/SECTION, LOW TRANSVERSE      x 2     CHOLECYSTECTOMY       ESOPHAGOSCOPY, GASTROSCOPY, DUODENOSCOPY (EGD), COMBINED N/A 2015    Procedure: COMBINED ESOPHAGOSCOPY, GASTROSCOPY, DUODENOSCOPY (EGD);  Surgeon: Sky Davey MD;  Location: [facility]     ESOPHAGOSCOPY, GASTROSCOPY, DUODENOSCOPY (EGD), COMBINED N/A 2015    Procedure: COMBINED ESOPHAGOSCOPY, GASTROSCOPY, DUODENOSCOPY (EGD), BIOPSY SINGLE OR MULTIPLE;  Surgeon: Sky Davey MD;  Location: [facility]     LAPAROSCOPY, SURGICAL; REPAIR INCISIONAL OR VENTRAL HERNIA       HERMINIA EN Y BOWEL       WRIST SURGERY       Current Outpatient Prescriptions   Medication     cycloSPORINE modified (GENERIC EQUIVALENT) 25 MG  capsule     topiramate (TOPAMAX) 200 MG tablet     albuterol (PROAIR HFA/PROVENTIL HFA/VENTOLIN HFA) 108 (90 BASE) MCG/ACT Inhaler     order for DME     vilazodone (VIIBRYD) 40 MG TABS tablet     prazosin (MINIPRESS) 5 MG capsule     ARIPiprazole (ABILIFY) 2 MG tablet     Vaginal Lubricant (REPLENS) GEL     furosemide (LASIX) 20 MG tablet     blood glucose monitoring (ACCU-CHEK RALPH PLUS) meter device kit     omeprazole (PRILOSEC) 40 MG capsule     simvastatin (ZOCOR) 20 MG tablet     Polyvinyl Alcohol-Povidone (REFRESH OP)     latanoprost (XALATAN) 0.005 % ophthalmic solution     blood glucose monitoring (NO BRAND SPECIFIED) meter device kit     blood glucose monitoring (NO BRAND SPECIFIED) test strip     blood glucose monitoring (SOFTCLIX) lancets     sulfamethoxazole-trimethoprim (BACTRIM/SEPTRA) 400-80 MG per tablet     mycophenolate (CELLCEPT - GENERIC EQUIVALENT) 250 MG capsule     metFORMIN (GLUCOPHAGE-XR) 500 MG 24 hr tablet     acetylcysteine (N-ACETYL-L-CYSTEINE) 600 MG CAPS capsule     ondansetron (ZOFRAN-ODT) 4 MG disintegrating tablet     Cholecalciferol (VITAMIN D) 1000 UNITS capsule     econazole nitrate 1 % cream     aspirin EC 81 MG EC tablet     acetaminophen (TYLENOL) 325 MG tablet     cyanocolbalamin (VITAMIN  B-12) 1000 MCG tablet     diphenhydrAMINE (BENADRYL) 25 MG capsule     No current facility-administered medications for this visit.      Immunization History   Administered Date(s) Administered     HepB 02/04/2010, 03/17/2010, 08/09/2010     Influenza (H1N1) 11/24/2009     Influenza (IIV3) 09/01/2008, 10/26/2011, 09/15/2012, 10/01/2013, 10/01/2014, 10/01/2015     Influenza Vaccine IM 3yrs+ 4 Valent IIV4 09/25/2016     Influenza Vaccine, 3 YRS +, IM (QUADRIVALENT W/PRESERVATIVES) 09/29/2017     Mantoux 02/15/2010     Pneumococcal 23 valent 11/25/2008     TDAP Vaccine (Boostrix) 10/12/2012     Tetanus 04/05/2005       PHYSICAL EXAM:  /80  Pulse 72  Resp 16  Wt 75.8 kg (167 lb 1.6  oz)  Breastfeeding? No  BMI 31.57 kg/m2    Wt Readings from Last 1 Encounters:   11/10/17 75.8 kg (167 lb 1.6 oz)     Constitutional: no distress, comfortable, pleasant   Eyes: anicteric, normal extra-ocular movements   Ears, Nose and Throat: throat clear, neck supple with full range of motion  Cardiovascular: regular rate and rhythm, normal S1 and S2, soft systolic murmurs  Respiratory: clear to auscultation, no wheezes or crackles, normal breath sounds   Gastrointestinal: positive bowel sounds, nontender, no hepatosplenomegaly, no masses   Musculoskeletal: full range of motion, no edema   Skin: no concerning lesions, no jaundice   Neurological: cranial nerves intact, normal strength and sensation, reflexes at patella and biceps normal, normal gait, no tremor   Psychological: appropriate mood   Lymphatic: no cervical lymphadenopathy    EKG:  EKG was not indicated based on risk assessment.

## 2017-11-10 NOTE — PROGRESS NOTES
Rooming Note    Pre-Operative Information  Surgery: Colonoscopy  Date of Surgery: 11/30/17  Surgeon: Marciano Chester MD  Fax: [phone]    Health Maintenance  Health Maintenance Due   Topic Date Due     SKIN CANCER SCREENING Q1 YR (NO IN BASKET)  1955     DEPRESSION ACTION PLAN Q1 YR  08/28/1975     ADVANCE DIRECTIVE PLANNING Q5 YRS  08/28/2012     PAP Q3 YR  12/20/2016     COLONOSCOPY Q5 YR  03/07/2017     MICROALBUMIN Q1 YEAR  08/12/2017     MAMMO Q1 YR  11/14/2017     A1C Q6 MO  11/19/2017     All health maintenance items discussed and pended.    Kristen Perry CMA at 10:05 AM on 11/10/2017

## 2017-11-10 NOTE — LETTER
11/10/2017      RE: Elizabeth Palma  [address]     [facility] [address]       Rooming Note    Pre-Operative Information  Surgery: Colonoscopy  Date of Surgery: 17  Surgeon: Marciano Chester MD  Fax: [phone]    Health Maintenance  Health Maintenance Due   Topic Date Due     SKIN CANCER SCREENING Q1 YR (NO IN BASKET)  1955     DEPRESSION ACTION PLAN Q1 YR  1975     ADVANCE DIRECTIVE PLANNING Q5 YRS  2012     PAP Q3 YR  2016     COLONOSCOPY Q5 YR  2017     MICROALBUMIN Q1 YEAR  2017     MAMMO Q1 YR  2017     A1C Q6 MO  2017     All health maintenance items discussed and pended.    Kristen Perry CMA at 10:05 AM on 11/10/2017    Elizabeth Palma is 62 year old female here at the request of Dr. Chisholm for cardiovascular, pulmonary, and perioperative risk assessment prior to surgery.The intended surgical procedure is sedated colonoscopy A copy of this note will be sent to the surgeon.    A/P:  Elizabeth Palma with a history of   Patient Active Problem List    Diagnosis Date Noted     Type 2 diabetes mellitus with peripheral neuropathy (H) 2015     Priority: High     -donor kidney transplant 2014     Priority: High     Major depressive disorder, recurrent episode, moderate (H) 11/15/2012     Priority: High     Autosomal dominant polycystic kidney disease 2011     Priority: High     (Problem list name updated by automated process. Provider to review and confirm.)       OP (osteoporosis) T score -3.8 2009     Priority: High     2007 T-score -3.7       Age-related osteoporosis without current pathological fracture 08/10/2016     Priority: Medium     Right knee pain 2016     Priority: Medium     Left knee pain 2016     Priority: Medium     Posttraumatic stress disorder 2015     Priority: Medium     Nightmares associated with chronic post-traumatic stress disorder 10/27/2015     Priority:  Medium     Pain in joint involving ankle and foot 07/13/2015     Priority: Medium     Senile osteoporosis 05/29/2015     Priority: Medium     Hyperparathyroidism (H) 02/26/2015     Priority: Medium     Problem list name updated by automated process. Provider to review       Hyperparathyroidism, secondary (H) 02/25/2015     Priority: Medium     Immunosuppressed status (H) 03/20/2014     Priority: Medium     Pain in joint, forearm -- L unhealed Fx 05/21/2013     Priority: Medium     CMC DJD(carpometacarpal degenerative joint disease), localized primary 03/05/2013     Priority: Medium     Generalized anxiety disorder 11/15/2012     Priority: Medium     LION on CPAP 10/15/2012     Priority: Medium     Premature menopause age 37 07/10/2012     Priority: Medium     OCP (vaginal bldg)-->HT which she stopped 2 mo later documented at Jan 12, 2007 visit (age 51).       Sensory loss 10/17/2011     Priority: Medium     Bottom of feet; uncertain if there is a neuropathy per notes.        Hypertension 10/15/2011     Priority: Medium     Hyperlipidemia 10/15/2011     Priority: Medium     Abnormal MRI, cervical spine 10/15/2011     Priority: Medium     4/2011; mild changes noted. Study done for left arm symptoms  Impression:   1. Mild multilevel degenerative disc disease with no significant canal  or neural stenosis seen. motion artifact on the STIR images in these  are not interpretable. The remaining images were interpreted       presents prior to surgery for assessment of perioperative risk. The patient is at LOW risk for cardiovascular complications and at LOW risk for pulmonary complications of this LOW risk surgery.    The proposed surgical procedure is considered LOW risk.    REVISED CARDIAC RISK INDEX  The patient has the following serious cardiovascular risks for perioperative complications such as (MI, PE, VFib and 3  AV Block):  No serious cardiac risks  INTERPRETATION: 0 risks: Class I (very low risk - 0.4% complication  rate)    Recommend that she proceed with proposed procedure, without further diagnostic evaluation    She can take all of her meds the morning of the procedure.    Laboratory studies:  None    Please contact our office if there are any further questions or information required about this patient.    Horacio Sheridan MD    --------------------------------------------------------    HPI:   Reason for surgery:  She will have a routine screening colonoscopy which needs to be under sedation.  She thinks the sedation is needed because of the diabetes (?).  No problems with preparation and complications afterwards in other places.    Cardiovascular Risk:  This patient does not have chest pain with ambulation or walking up a flight of stairs.  There is not any chest pain with exercise.  There is not a history of heart disease or valve problems.    Pulmonary Risk:  The patient does not have a history of lung problems.    Perioperative Complications:  The patient does not have a history of bleeding or clotting problems in the past. The patient has not had complications from past surgeries.  The patient does not have a family history of any anesthesia - her sister  from anesthesia for a heart valve replacement - she says her heart stopped.    ROS:  Constitutional: no fevers, night sweats  Eyes: no vision change, diplopia or red eyes   Ears, Nose, Mouth, Throat: no tinnitus or hearing change, no epistaxis or nasal discharge, no oral lesions, throat clear   Cardiovascular: no chest pain, palpitations, or pain with walking, no orthopnea or PND   Respiratory: no dyspnea, cough, shortness of breath or wheezing   GI: no nausea, vomiting, diarrhea or constipation, no abdominal pain   : no change in urine, no dysuria or hematuria  Musculoskeletal: she broke her left wrist over the summer and has to surgery in January  Neuro: no loss of strength or sensation, no numbness or tingling, no tremor, no dizziness, no headache    Heme/Lymph: no concerning bumps, no bleeding problems   Allergy: she has environmental allergies for which she uses an inhaler  Psych: no depression or anxiety, no sleep problems    Family History   Problem Relation Age of Onset     MENTAL ILLNESS Other      family hx     HEART DISEASE Other      DIABETES Other      CANCER Other      Glaucoma No family hx of      Macular Degeneration No family hx of      Social History     Social History     Marital status:      Spouse name: Rahat     Number of children: 2     Years of education: N/A     Occupational History           part-time     Social History Main Topics     Smoking status: Former Smoker     Smokeless tobacco: Never Used     Alcohol use No     Drug use: No     Sexual activity: Yes     Partners: Male     Birth control/ protection: Post-menopausal      Comment: 1 partner     Other Topics Concern     Not on file     Social History Narrative     Patient Active Problem List   Diagnosis     Autosomal dominant polycystic kidney disease     Hypertension     Hyperlipidemia     Abnormal MRI, cervical spine     Sensory loss     Premature menopause age 37     OP (osteoporosis) T score -3.8     LION on CPAP     Major depressive disorder, recurrent episode, moderate (H)     Generalized anxiety disorder     CMC DJD(carpometacarpal degenerative joint disease), localized primary     Pain in joint, forearm -- L unhealed Fx     -donor kidney transplant     Immunosuppressed status (H)     Hyperparathyroidism, secondary (H)     Hyperparathyroidism (H)     Senile osteoporosis     Pain in joint involving ankle and foot     Nightmares associated with chronic post-traumatic stress disorder     Type 2 diabetes mellitus with peripheral neuropathy (H)     Posttraumatic stress disorder     Right knee pain     Left knee pain     Age-related osteoporosis without current pathological fracture     Past Surgical History:   Procedure Laterality Date     ANKLE SURGERY       C  TRANSPLANTATION OF KIDNEY  3/2014     C/SECTION, LOW TRANSVERSE      x 2     CHOLECYSTECTOMY  1990     ESOPHAGOSCOPY, GASTROSCOPY, DUODENOSCOPY (EGD), COMBINED N/A 5/19/2015    Procedure: COMBINED ESOPHAGOSCOPY, GASTROSCOPY, DUODENOSCOPY (EGD);  Surgeon: Sky Davey MD;  Location: [facility]     ESOPHAGOSCOPY, GASTROSCOPY, DUODENOSCOPY (EGD), COMBINED N/A 5/19/2015    Procedure: COMBINED ESOPHAGOSCOPY, GASTROSCOPY, DUODENOSCOPY (EGD), BIOPSY SINGLE OR MULTIPLE;  Surgeon: Sky Davey MD;  Location: [facility]     LAPAROSCOPY, SURGICAL; REPAIR INCISIONAL OR VENTRAL HERNIA       HERMINIA EN Y BOWEL  1990     WRIST SURGERY       Current Outpatient Prescriptions   Medication     cycloSPORINE modified (GENERIC EQUIVALENT) 25 MG capsule     topiramate (TOPAMAX) 200 MG tablet     albuterol (PROAIR HFA/PROVENTIL HFA/VENTOLIN HFA) 108 (90 BASE) MCG/ACT Inhaler     order for DME     vilazodone (VIIBRYD) 40 MG TABS tablet     prazosin (MINIPRESS) 5 MG capsule     ARIPiprazole (ABILIFY) 2 MG tablet     Vaginal Lubricant (REPLENS) GEL     furosemide (LASIX) 20 MG tablet     blood glucose monitoring (ACCU-CHEK RALPH PLUS) meter device kit     omeprazole (PRILOSEC) 40 MG capsule     simvastatin (ZOCOR) 20 MG tablet     Polyvinyl Alcohol-Povidone (REFRESH OP)     latanoprost (XALATAN) 0.005 % ophthalmic solution     blood glucose monitoring (NO BRAND SPECIFIED) meter device kit     blood glucose monitoring (NO BRAND SPECIFIED) test strip     blood glucose monitoring (SOFTCLIX) lancets     sulfamethoxazole-trimethoprim (BACTRIM/SEPTRA) 400-80 MG per tablet     mycophenolate (CELLCEPT - GENERIC EQUIVALENT) 250 MG capsule     metFORMIN (GLUCOPHAGE-XR) 500 MG 24 hr tablet     acetylcysteine (N-ACETYL-L-CYSTEINE) 600 MG CAPS capsule     ondansetron (ZOFRAN-ODT) 4 MG disintegrating tablet     Cholecalciferol (VITAMIN D) 1000 UNITS capsule     econazole nitrate 1 % cream     aspirin EC 81 MG EC tablet     acetaminophen (TYLENOL) 325  MG tablet     cyanocolbalamin (VITAMIN  B-12) 1000 MCG tablet     diphenhydrAMINE (BENADRYL) 25 MG capsule     No current facility-administered medications for this visit.      Immunization History   Administered Date(s) Administered     HepB 02/04/2010, 03/17/2010, 08/09/2010     Influenza (H1N1) 11/24/2009     Influenza (IIV3) 09/01/2008, 10/26/2011, 09/15/2012, 10/01/2013, 10/01/2014, 10/01/2015     Influenza Vaccine IM 3yrs+ 4 Valent IIV4 09/25/2016     Influenza Vaccine, 3 YRS +, IM (QUADRIVALENT W/PRESERVATIVES) 09/29/2017     Mantoux 02/15/2010     Pneumococcal 23 valent 11/25/2008     TDAP Vaccine (Boostrix) 10/12/2012     Tetanus 04/05/2005       PHYSICAL EXAM:  /80  Pulse 72  Resp 16  Wt 75.8 kg (167 lb 1.6 oz)  Breastfeeding? No  BMI 31.57 kg/m2    Wt Readings from Last 1 Encounters:   11/10/17 75.8 kg (167 lb 1.6 oz)     Constitutional: no distress, comfortable, pleasant   Eyes: anicteric, normal extra-ocular movements   Ears, Nose and Throat: throat clear, neck supple with full range of motion  Cardiovascular: regular rate and rhythm, normal S1 and S2, soft systolic murmurs  Respiratory: clear to auscultation, no wheezes or crackles, normal breath sounds   Gastrointestinal: positive bowel sounds, nontender, no hepatosplenomegaly, no masses   Musculoskeletal: full range of motion, no edema   Skin: no concerning lesions, no jaundice   Neurological: cranial nerves intact, normal strength and sensation, reflexes at patella and biceps normal, normal gait, no tremor   Psychological: appropriate mood   Lymphatic: no cervical lymphadenopathy    EKG:  EKG was not indicated based on risk assessment.                  Horacio Sheridan MD

## 2017-11-13 ENCOUNTER — TELEPHONE (OUTPATIENT)
Dept: ENDOCRINOLOGY | Facility: CLINIC | Age: 62
End: 2017-11-13

## 2017-11-13 RX ORDER — ZOLEDRONIC ACID 5 MG/100ML
5 INJECTION, SOLUTION INTRAVENOUS ONCE
Status: CANCELLED
Start: 2017-11-13 | End: 2017-11-13

## 2017-11-13 NOTE — TELEPHONE ENCOUNTER
Spoke w/pt regarding Reclast infusion. Pt instructed to call infusion ctr on Wednesday , option 7, option 2. Pt stated understanding and would call infusion ctr on Wed.

## 2017-11-14 ENCOUNTER — OFFICE VISIT (OUTPATIENT)
Dept: PSYCHIATRY | Facility: CLINIC | Age: 62
End: 2017-11-14

## 2017-11-14 VITALS
DIASTOLIC BLOOD PRESSURE: 76 MMHG | BODY MASS INDEX: 31.45 KG/M2 | HEART RATE: 64 BPM | HEIGHT: 61 IN | SYSTOLIC BLOOD PRESSURE: 124 MMHG | WEIGHT: 166.6 LBS

## 2017-11-14 DIAGNOSIS — F51.5 NIGHTMARES ASSOCIATED WITH CHRONIC POST-TRAUMATIC STRESS DISORDER: ICD-10-CM

## 2017-11-14 DIAGNOSIS — F33.1 MAJOR DEPRESSIVE DISORDER, RECURRENT EPISODE, MODERATE (H): Primary | ICD-10-CM

## 2017-11-14 DIAGNOSIS — F43.12 NIGHTMARES ASSOCIATED WITH CHRONIC POST-TRAUMATIC STRESS DISORDER: ICD-10-CM

## 2017-11-14 RX ORDER — VILAZODONE HYDROCHLORIDE 40 MG/1
40 TABLET ORAL DAILY
Qty: 30 TABLET | Refills: 3 | Status: SHIPPED | OUTPATIENT
Start: 2017-11-14 | End: 2018-02-27

## 2017-11-14 RX ORDER — ARIPIPRAZOLE 2 MG/1
1 TABLET ORAL DAILY
Qty: 15 TABLET | Refills: 3 | Status: SHIPPED | OUTPATIENT
Start: 2017-11-14 | End: 2018-02-27

## 2017-11-14 RX ORDER — PRAZOSIN HYDROCHLORIDE 5 MG/1
15 CAPSULE ORAL AT BEDTIME
Qty: 90 CAPSULE | Refills: 3 | Status: SHIPPED | OUTPATIENT
Start: 2017-11-14 | End: 2018-02-27

## 2017-11-14 NOTE — MR AVS SNAPSHOT
After Visit Summary   11/14/2017    Elizabeth Palma    MRN: [id number]           Patient Information     Date Of Birth          1955        Visit Information        Provider Department      11/14/2017 1:00 PM Chaparrita Hatch MD [facility]        Today's Diagnoses     Major depressive disorder, recurrent episode, moderate (H)    -  1    Nightmares associated with chronic post-traumatic stress disorder           Follow-ups after your visit        Follow-up notes from your care team     Return in about 4 weeks (around 12/12/2017).      Your next 10 appointments already scheduled     Nov 17, 2017  9:00 AM CST   LAB with  LAB   [facility] Lab ([facility])    [address] [place] [id number] [phone]           Please do not eat 10-12 hours before your appointment if you are coming in fasting for labs on lipids, cholesterol, or glucose (sugar). This does not apply to pregnant women. Water, hot tea and black coffee (with nothing added) are okay. Do not drink other fluids, diet soda or chew gum.            Nov 17, 2017  9:30 AM CST   NUTRITION VISIT with Francesca Danielle RD   [facility] Surgical Weight Management ([facility] [place])    [address] [place] [id number]   [phone]            Nov 20, 2017 10:45 AM CST   (Arrive by 10:30 AM)   Return Visit with Prakash Irene MD   [facility] Medical Weight Management ([facility])    [address] [place] [address]   [phone]            Nov 20, 2017  1:00 PM CST   Return Visit with Javier Tuttle DPM   [facility] ([facility])    [address] [address] [place] [address]   [phone]            Nov 30, 2017   Procedure with Marciano Chisholm MD   [facility] [place], Endoscopy ([facility]  [place])    [address] [facility] [address]   [phone]           The [facility] is located on the corner of [facility] [address] [facility] on the [facility]. It is easily accessible from virtually any point in the [facility] area, via [address] and [address].            Dec 11, 2017 12:00 PM CST   (Arrive by 11:45 AM)   PRE-OP with Jayro Jordan MD   [facility] ([facility])    [address] [place] [id number] [phone]            Dec 22, 2017  9:00 AM CST   LAB with  LAB   [facility] Lab ([facility])    [address] [place] [id number] [phone]           Please do not eat 10-12 hours before your appointment if you are coming in fasting for labs on lipids, cholesterol, or glucose (sugar). This does not apply to pregnant women. Water, hot tea and black coffee (with nothing added) are okay. Do not drink other fluids, diet soda or chew gum.            Dec 22, 2017  9:30 AM CST   NUTRITION VISIT with Francesca Danielle RD   [facility] Surgical Weight Management ([facility])    [address] [place] [id number] [phone]            Dec 26, 2017  1:00 PM CST   Adult Med Follow UP with Chaparrita Hatch MD   [facility] ([facility])    [address]  [facility] [address] [phone]            Jan 08, 2018  9:30 AM CST   (Arrive by 9:15 AM)   Return Visit with Horacio Sheridan MD   [facility] ([facility])    [address] [place] [phone]              Who to contact     Please call your clinic at [phone] to:    Ask questions about your health    Make or cancel appointments    Discuss your medicines    Learn about your test results    Speak to your doctor   If  "you have compliments or concerns about an experience at your clinic, or if you wish to file a complaint, please contact [facility] Patient Relations at [phone] or email us at [email or web address]         Additional Information About Your Visit        MyChart Information     [facility] gives you secure access to your electronic health record. If you see a primary care provider, you can also send messages to your care team and make appointments. If you have questions, please call your primary care clinic.  If you do not have a primary care provider, please call [phone] and they will assist you.      [employer] is an electronic gateway that provides easy, online access to your medical records. With [employer], you can request a clinic appointment, read your test results, renew a prescription or communicate with your care team.     To access your existing account, please contact your [facility] or call [phone] for assistance.        Care EveryWhere ID     This is your Care EveryWhere ID. This could be used by other organizations to access your [facility] medical records  [id number]        Your Vitals Were     Pulse Height BMI (Body Mass Index)             64 5' 1\" (154.9 cm) 31.48 kg/m2          Blood Pressure from Last 3 Encounters:   11/14/17 124/76   11/10/17 120/80   10/10/17 120/81    Weight from Last 3 Encounters:   11/14/17 166 lb 9.6 oz (75.6 kg)   11/10/17 167 lb 1.6 oz (75.8 kg)   10/13/17 166 lb 9.6 oz (75.6 kg)              Today, you had the following     No orders found for display         Today's Medication Changes          These changes are accurate as of: 11/14/17  1:48 PM.  If you have any questions, ask your nurse or doctor.               These medicines have changed or have updated prescriptions.        Dose/Directions    cyanocobalamin 1000 MCG tablet   Commonly known as:  vitamin  B-12   This may have changed:  when to take " this   Used for:  CKD (chronic kidney disease) stage 5, GFR less than 15 ml/min (H)        Dose:  1000 mcg   Take 1 tablet by mouth daily.   Quantity:  30 tablet   Refills:  0       econazole nitrate 1 % cream   This may have changed:    - when to take this  - reasons to take this   Used for:  Type II or unspecified type diabetes mellitus with neurological manifestations, not stated as uncontrolled(250.60) (H), Dermatophytosis of foot        Apply topically daily   Quantity:  85 g   Refills:  3            Where to get your medicines      These medications were sent to [email or web address] - [place], [place] - [address] AT [facility] [address]  [address], [facility] [address]    Hours:  24-hours Phone:  [phone]     ARIPiprazole 2 MG tablet    prazosin 5 MG capsule    vilazodone 40 MG Tabs tablet                Primary Care Provider    [place] [id number]        Equal Access to Services     [facility] AH: Hadii aad ku hadasho Soomaali, waaxda luqadaha, qaybta kaalmada adeegyada, waxay idiin hayaan adeeg kharacoy cruzaan ah. So [facility] [place] [phone].    ATENCIÓN: Si habla español, tiene a goetz disposición servicios gratuitos de asistencia lingüística. Rachel al [phone].    We comply with applicable federal civil rights laws and [place] laws. We do not discriminate on the basis of race, color, national origin, age, disability, sex, sexual orientation, or gender identity.            Thank you!     Thank you for choosing [facility]  for your care. Our goal is always to provide you with excellent care. Hearing back from our patients is one way we can continue to improve our services. Please take a few minutes to complete the written survey that you may receive in the mail after your visit with us. Thank you!             Your Updated Medication List - Protect others around you: Learn how to safely use, store and throw away your medicines at www.disposemymeds.org.          This list is  accurate as of: 11/14/17  1:48 PM.  Always use your most recent med list.                   Brand Name Dispense Instructions for use Diagnosis    acetylcysteine 600 MG Caps capsule    N-ACETYL CYSTEINE    30 capsule    Take 2 400 mg caps two times daily for total daily dose of 800 mg        albuterol 108 (90 BASE) MCG/ACT Inhaler    PROAIR HFA/PROVENTIL HFA/VENTOLIN HFA    1 Inhaler    Inhale 2 puffs into the lungs every 6 hours as needed for shortness of breath / dyspnea or wheezing    Exercise-induced asthma       ARIPiprazole 2 MG tablet    ABILIFY    15 tablet    Take 0.5 tablets (1 mg) by mouth daily    Major depressive disorder, recurrent episode, moderate (H)       aspirin 81 MG EC tablet     30 tablet    Take 1 tablet (81 mg) by mouth daily    Kidney replaced by transplant       BENADRYL 25 MG capsule   Generic drug:  diphenhydrAMINE      Take 25 mg by mouth At Bedtime.        blood glucose monitoring lancets     1 Box    Use to test blood sugar 2 times daily or as directed.    Type 2 diabetes mellitus with peripheral neuropathy (H)       * blood glucose monitoring meter device kit    no brand specified    1 kit    Use to test blood sugar 2 times daily or as directed.    Type 2 diabetes mellitus with peripheral neuropathy (H)       * blood glucose monitoring meter device kit           blood glucose monitoring test strip    no brand specified    100 strip    Use to test blood sugars 2 times daily or as directed    Type 2 diabetes mellitus with peripheral neuropathy (H)       cyanocobalamin 1000 MCG tablet    vitamin  B-12    30 tablet    Take 1 tablet by mouth daily.    CKD (chronic kidney disease) stage 5, GFR less than 15 ml/min (H)       cycloSPORINE modified 25 MG capsule    GENERIC EQUIVALENT    300 capsule    Take 5 capsules (125 mg) by mouth 2 times daily    Kidney replaced by transplant       econazole nitrate 1 % cream     85 g    Apply topically daily    Type II or unspecified type diabetes mellitus  with neurological manifestations, not stated as uncontrolled(250.60) (H), Dermatophytosis of foot       furosemide 20 MG tablet    LASIX    90 tablet    Take 1 tablet (20 mg) by mouth daily    Generalized edema       latanoprost 0.005 % ophthalmic solution    XALATAN    2.5 mL    Place 1 drop into both eyes At Bedtime    Mild stage glaucoma(365.71)       metFORMIN 500 MG 24 hr tablet    GLUCOPHAGE-XR    90 tablet    Take 3 tablets (1,500 mg) by mouth daily (with dinner)    Type 2 diabetes mellitus with diabetic polyneuropathy, without long-term current use of insulin (H)       mycophenolate 250 MG capsule    GENERIC EQUIVALENT    240 capsule    Take 4 capsules (1,000 mg) by mouth 2 times daily    Kidney transplanted       omeprazole 40 MG capsule    priLOSEC    90 capsule    Take 1 capsule (40 mg) by mouth daily    Gastroesophageal reflux disease without esophagitis       ondansetron 4 MG ODT tab    ZOFRAN-ODT    20 tablet    Take 1 tablet (4 mg) by mouth every 6 hours as needed    Chronic kidney disease, stage V (H)       order for DME     1 Units    Walker with front wheels and a seat.    Fall, initial encounter       prazosin 5 MG capsule    MINIPRESS    90 capsule    Take 3 capsules (15 mg) by mouth At Bedtime    Nightmares associated with chronic post-traumatic stress disorder       REFRESH OP      Apply to eye as needed Both eyes        replens Gel     35 g    Use vaginally as needed. Can use up to 3 times per week.    Vaginal dryness       simvastatin 20 MG tablet    ZOCOR    90 tablet    Take 1 tablet (20 mg) by mouth At Bedtime    Chronic kidney disease, stage V (H)       sulfamethoxazole-trimethoprim 400-80 MG per tablet    BACTRIM/SEPTRA    90 tablet    Take 1 tablet by mouth daily    Immunosuppression (H)       topiramate 200 MG tablet    TOPAMAX    60 tablet    Take 1 tablet (200 mg) by mouth 2 times daily    Morbid obesity due to excess calories (H)       TYLENOL 325 MG tablet   Generic drug:   acetaminophen      Take 325-650 mg by mouth as needed        vilazodone 40 MG Tabs tablet    VIIBRYD    30 tablet    Take 1 tablet (40 mg) by mouth daily    Major depressive disorder, recurrent episode, moderate (H)       vitamin D 1000 UNITS capsule     30 capsule    2,000 Units        * Notice:  This list has 2 medication(s) that are the same as other medications prescribed for you. Read the directions carefully, and ask your doctor or other care provider to review them with you.
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
## 2017-11-16 DIAGNOSIS — E66.01 MORBID OBESITY DUE TO EXCESS CALORIES (H): ICD-10-CM

## 2017-11-16 ASSESSMENT — ENCOUNTER SYMPTOMS
NECK PAIN: 0
JOINT SWELLING: 0
MYALGIAS: 0
STIFFNESS: 0
ARTHRALGIAS: 0
BACK PAIN: 0
MUSCLE CRAMPS: 0
MUSCLE WEAKNESS: 0

## 2017-11-17 ENCOUNTER — ALLIED HEALTH/NURSE VISIT (OUTPATIENT)
Dept: SURGERY | Facility: CLINIC | Age: 62
End: 2017-11-17

## 2017-11-17 DIAGNOSIS — Z79.899 ENCOUNTER FOR LONG-TERM CURRENT USE OF MEDICATION: ICD-10-CM

## 2017-11-17 DIAGNOSIS — Z94.0 KIDNEY REPLACED BY TRANSPLANT: ICD-10-CM

## 2017-11-17 DIAGNOSIS — Z48.298 AFTERCARE FOLLOWING ORGAN TRANSPLANT: Primary | ICD-10-CM

## 2017-11-17 DIAGNOSIS — Z48.298 AFTERCARE FOLLOWING ORGAN TRANSPLANT: ICD-10-CM

## 2017-11-17 LAB
ANION GAP SERPL CALCULATED.3IONS-SCNC: 11 MMOL/L (ref 3–14)
BUN SERPL-MCNC: 14 MG/DL (ref 7–30)
CALCIUM SERPL-MCNC: 9 MG/DL (ref 8.5–10.1)
CHLORIDE SERPL-SCNC: 108 MMOL/L (ref 94–109)
CO2 SERPL-SCNC: 21 MMOL/L (ref 20–32)
CREAT SERPL-MCNC: 0.98 MG/DL (ref 0.52–1.04)
CYCLOSPORINE BLD LC/MS/MS-MCNC: 101 UG/L (ref 50–400)
ERYTHROCYTE [DISTWIDTH] IN BLOOD BY AUTOMATED COUNT: 14.8 % (ref 10–15)
GFR SERPL CREATININE-BSD FRML MDRD: 58 ML/MIN/1.7M2
GLUCOSE SERPL-MCNC: 150 MG/DL (ref 70–99)
HCT VFR BLD AUTO: 42.1 % (ref 35–47)
HGB BLD-MCNC: 13.1 G/DL (ref 11.7–15.7)
MCH RBC QN AUTO: 26.9 PG (ref 26.5–33)
MCHC RBC AUTO-ENTMCNC: 31.1 G/DL (ref 31.5–36.5)
MCV RBC AUTO: 86 FL (ref 78–100)
PLATELET # BLD AUTO: 164 10E9/L (ref 150–450)
POTASSIUM SERPL-SCNC: 3.7 MMOL/L (ref 3.4–5.3)
RBC # BLD AUTO: 4.87 10E12/L (ref 3.8–5.2)
SODIUM SERPL-SCNC: 140 MMOL/L (ref 133–144)
TME LAST DOSE: NORMAL H
WBC # BLD AUTO: 3.6 10E9/L (ref 4–11)

## 2017-11-17 NOTE — MR AVS SNAPSHOT
MRN:[id number]                      After Visit Summary   11/17/2017    Elizabeth Palma    MRN: [id number]           Visit Information        Provider Department      11/17/2017 9:30 AM Francesca Danielle RD [facility] Surgical Weight Management        Your next 10 appointments already scheduled     Nov 20, 2017 10:45 AM CST   (Arrive by 10:30 AM)   Return Visit with Prakash Irene MD   [facility] Medical Weight Management ([facility])    [address] [place] [address]  4th Floor  [facility] [id number]   [phone]            Nov 20, 2017  1:00 PM CST   Return Visit with Javier Tuttle DPM   [facility] ([facility])    [address] [place] [id number] [phone]            Nov 28, 2017  1:00 PM CST   [facility] Draw with UC [facility] LAB DRAW   [facility] Lab Draw ([facility])    [address] [place] [address]  2nd [facility] [id number] [phone]            Nov 28, 2017  2:00 PM CST   Infusion 60 with UC SPEC INFUSION, UC 42 ATC   [facility] Specialty and Procedure ([facility])    [address] [place] [id number] [phone]            Nov 30, 2017   Procedure with Marciano Chisholm MD   [facility] [place], Endoscopy ([facility])    [address] [facility] [place] [address]   [phone]           The [facility] is located on the corner of [facility] and [facility] on the [facility]. It is easily accessible from virtually any point in the [facility] area, via [address] and [address].            Dec 05, 2017 10:15 AM CST   (Arrive by 10:00 AM)   MA SCREENING DIGITAL BILATERAL with UCBCMA1   [facility] Imaging ([facility])    [address] [place] [address]  2nd [facility] [address]  "  [phone]           Do not use any powder, lotion or deodorant under your arms or on your breast. If you do, we will ask you to remove it before your exam.  Wear comfortable, two-piece clothing.  If you have any allergies, tell your care team.  Bring any previous mammograms from other facilities or have them mailed to the breast center. Three-dimensional (3D) mammograms are available at [place] locations in [facility], [facility], [facility], and [place]. [facility] locations include [place] and [facility] in [place]. Benefits of 3D mammograms include: - Improved rate of cancer detection - Decreases your chance of having to go back for more tests, which means fewer: - \"False-positive\" results (This means that there is an abnormal area but it isn't cancer.) - Invasive testing procedures, such as a biopsy or surgery - Can provide clearer images of the breast if you have dense breast tissue. 3D mammography is an optional exam that anyone can have with a 2D mammogram. It doesn't replace or take the place of a 2D mammogram. 2D mammograms remain an effective screening test for all women.  Not all insurance companies cover the cost of a 3D mammogram. Check with your insurance.            Dec 11, 2017 12:00 PM CST   (Arrive by 11:45 AM)   PRE-OP with Jayro Jordan MD   [facility] ([facility])    [address] [place] [address]  4th [facility] [address] [phone]            Dec 22, 2017  9:00 AM CST   LAB with  LAB   [facility])    [address] [place] [id number] [phone]           Please do not eat 10-12 hours before your appointment if you are coming in fasting for labs on lipids, cholesterol, or glucose (sugar). This does not apply to pregnant women. Water, hot tea and black coffee (with nothing added) are okay. Do not drink other " fluids, diet soda or chew gum.            Dec 22, 2017  9:30 AM CST   NUTRITION VISIT with Francesca Danielle RD   [facility] ([facility])    [address] [address] [phone]              Care Instructions    Goals:    1. Avoid grazing through, Eat 3 meals with lean protein at each meal, along with a non-starchy vegetable or whole fruit, up to 1/2 c carb at a meal.   -Try hard-boiled eggs to put on your salad or to have later in the day to make up for skipping breakfast.     2. If needing a snack, have vegetables (give at least 2 hours between a meals and a snack).  3. Walk (with walker) 5-10 min daily, increasing as able.  4. Check vitamin and mineral labs at next doctor appointment due to history of bariatric surgery (vitamin A, Vitamin D, zinc, iron, B12, B1, calcium, PTH).         [facility] [employer] Information     [employer] gives you secure access to your electronic health record. If you see a primary care provider, you can also send messages to your care team and make appointments. If you have questions, please call your primary care clinic.  If you do not have a primary care provider, please call [phone] and they will assist you.      [facility] is an electronic gateway that provides easy, online access to your medical records. With [facility] [employer], you can request a clinic appointment, read your test results, renew a prescription or communicate with your care team.     To access your existing account, please contact your [facility] or call [phone] for assistance.        Care EveryWhere ID     This is your Care EveryWhere ID. This could be used by other organizations to access your [facility] medical records  [id number]        Equal Access to Services     LINNEA HIDALGO : radha Cabrera, ty perla. So [facility]  [phone].    ATENCIÓN: Si habla español, tiene a goetz disposición servicios gratuitos de asistencia lingüística. Llame al [phone].    We comply with applicable federal civil rights laws and [place] laws. We do not discriminate on the basis of race, color, national origin, age, disability, sex, sexual orientation, or gender identity.

## 2017-11-17 NOTE — PATIENT INSTRUCTIONS
Goals:    1. Avoid grazing through, Eat 3 meals with lean protein at each meal, along with a non-starchy vegetable or whole fruit, up to 1/2 c carb at a meal.   -Try hard-boiled eggs to put on your salad or to have later in the day to make up for skipping breakfast.     2. If needing a snack, have vegetables (give at least 2 hours between a meals and a snack).  3. Walk (with walker) 5-10 min daily, increasing as able.  4. Check vitamin and mineral labs at next doctor appointment due to history of bariatric surgery (vitamin A, Vitamin D, zinc, iron, B12, B1, calcium, PTH).

## 2017-11-17 NOTE — PROGRESS NOTES
"Nutrition Reassessment  Reason For Visit:  Elizabeth Palma is a 62 year-old female with type 2 DM (oral med management) presenting today for nutrition follow-up, s/p \"gastric bypass in 1990 at [facility]\" per Pt report.  She is seeing medical weight management.  She was referred by Dr. Irene.  Note: Pt had a kidney transplant on March 20, 2014.    Pt was accompanied by her .     Anthropometrics:  Height: 61\"  Pre-op Weight: 265 lbs per Pt report; lowest weight after bariatric surgery: 165-170 lbs (25 years ago)  (Pt reported that she initially was at 215 lbs in 2015 prior to seeing writer.)    Current Weight: 164.9 lbs (-1.7 lbs over the past month) with BMI of 31.54.    Current Vitamins/Minerals: 3000 International Units vitamin D/day, Calcium, vitamin C, vitamin B12 daily, B-complex  *Pt stated that she was told not to take other vitamins/minerals by MD.    Nutrition History:  Lactose intolerance ever since bariatric surgery.  Pt stated that she has osteoporosis; however, she stated that her doctors don't want her to take any vitamins or minerals due to her kidney transplant.    Diet/Nutrition Intake Hx: Pt reports her intake varies due to fluctuating appetite. Per pt there are days when she eats 3 meals but on other days she may not eat much at all or nibble on something through out the day. Pt also states her intake is affected by nausea 2/2 her anti-rejection medications. However pt reports she tries to make healthy choices (lower in calorie). Pt is also limited in protein choices that she likes - pt reports she does not take much dairy, does not like beans and lentils, keeps kosher. Advised pt to try to time her meals and eat every 4 hours instead of grazing but pt refused stating she would rather not eat at all.     *Pt stated that she will not record food intake due to the fact that every time she does this, she simply does not eat as she doesn't want to record.  She stated that her therapist " strongly advises against recording food intake for this reason.      Physical Activity Note: Pt reports she has a tendency to break bones so she is limited with what exercises she does. Pt states there is a long hallway in the building that she needs to walk when going out. Pt states she does not walk for exercises but walks only to get ADLs done.      Progress with Previous Goals:    1. Avoid grazing through, Eat 3 meals with lean protein at each meal, along with a non-starchy vegetable or whole fruit, up to 1/2 c carb at a meal. - Pt is eating 3 times/day (2 meals, 1 snack - focusing on protein at each time).    -Try hard-boiled eggs to put on your salad or to have later in the day to make up for skipping breakfast.     2. If needing a snack, have vegetables (give at least 2 hours between a meals and a snack).  - meeting.   3. Restart exercise routine with walker (at least 1 time/week walking for 15 min/day slowly increase by 5 minutes a day to a goal of at least 30 minutes or Try a gentle group class) - Pt is walking daily 5-10 minutes.   4. Check vitamin and mineral labs at next doctor appointment (vitamin A, Vitamin D, zinc, iron, B12, B1, calcium, PTH).  - Not completed as of yet.     Nutrition Prescription:  Grams Protein: 60 (minimum)  Amount of Fluid: 48-64 oz    Nutrition Diagnosis  Previous: Obesity related to excessive energy intake as evidenced by BMI > 30.- continues    Intervention  Intervention At Appointment:  Materials/Education provided:  Praised Pt for weight loss, discussing importance of continuing with the goals.  Discussed Thanksgiving plans and strategies.  Suggested previously and reminded following up with bariatric surgeon/PA for labs; however, Pt prefers to work through PCP for now (she sees quite a large number of providers already).  Discussed meal planning and optimizing protein.  Encouraged exercise.  Gave encouragement and support to continue.       Patient Understanding:  good  Expected Compliance: good    Goals:    1. Avoid grazing through, Eat 3 meals with lean protein at each meal, along with a non-starchy vegetable or whole fruit, up to 1/2 c carb at a meal.   -Try hard-boiled eggs to put on your salad or to have later in the day to make up for skipping breakfast.     2. If needing a snack, have vegetables (give at least 2 hours between a meals and a snack).  3. Walk (with walker) 5-10 min daily, increasing as able.  4. Check vitamin and mineral labs at next doctor appointment due to history of bariatric surgery (vitamin A, Vitamin D, zinc, iron, B12, B1, calcium, PTH).    Follow-Up: 1 month    Time spent with patient: 30 minutes.  Francesca Danielle MS, RDN, LDN, CLT  Pager: [phone]

## 2017-11-18 RX ORDER — TOPIRAMATE 200 MG/1
TABLET, FILM COATED ORAL
Qty: 32 TABLET | Refills: 1 | OUTPATIENT
Start: 2017-11-18

## 2017-11-20 ENCOUNTER — OFFICE VISIT (OUTPATIENT)
Dept: PODIATRY | Facility: CLINIC | Age: 62
End: 2017-11-20
Payer: COMMERCIAL

## 2017-11-20 ENCOUNTER — OFFICE VISIT (OUTPATIENT)
Dept: ENDOCRINOLOGY | Facility: CLINIC | Age: 62
End: 2017-11-20

## 2017-11-20 VITALS
SYSTOLIC BLOOD PRESSURE: 136 MMHG | DIASTOLIC BLOOD PRESSURE: 80 MMHG | HEART RATE: 86 BPM | WEIGHT: 163.8 LBS | BODY MASS INDEX: 30.93 KG/M2 | OXYGEN SATURATION: 98 % | HEIGHT: 61 IN

## 2017-11-20 VITALS — OXYGEN SATURATION: 98 % | SYSTOLIC BLOOD PRESSURE: 114 MMHG | HEART RATE: 72 BPM | DIASTOLIC BLOOD PRESSURE: 75 MMHG

## 2017-11-20 DIAGNOSIS — L60.2 ONYCHAUXIS: Primary | ICD-10-CM

## 2017-11-20 DIAGNOSIS — E11.49 TYPE II OR UNSPECIFIED TYPE DIABETES MELLITUS WITH NEUROLOGICAL MANIFESTATIONS, NOT STATED AS UNCONTROLLED(250.60) (H): ICD-10-CM

## 2017-11-20 DIAGNOSIS — E66.01 MORBID OBESITY DUE TO EXCESS CALORIES (H): ICD-10-CM

## 2017-11-20 PROCEDURE — 99213 OFFICE O/P EST LOW 20 MIN: CPT | Performed by: PODIATRIST

## 2017-11-20 RX ORDER — TOPIRAMATE 200 MG/1
200 TABLET, FILM COATED ORAL 2 TIMES DAILY
Qty: 60 TABLET | Refills: 5 | Status: SHIPPED | OUTPATIENT
Start: 2017-11-20 | End: 2018-05-15

## 2017-11-20 NOTE — PROGRESS NOTES
Past Medical History:   Diagnosis Date     Abnormal MRI, cervical spine 10/15/2011    2011; mild changes noted. Study done for left arm symptoms Impression:  1. Mild multilevel degenerative disc disease with no significant canal or neural stenosis seen. motion artifact on the STIR images in these are not interpretable. The remaining images were interpreted      Autosomal dominant polycystic kidney disease 2011     (Problem list name updated by automated process. Provider to review and confirm.)     CMC DJD(carpometacarpal degenerative joint disease), localized primary 3/5/2013     -donor kidney transplant 3/20/2014     Depressive disorder 11/15/2012     DM type 2 (diabetes mellitus, type 2) (H) 2013     Encounter for long-term (current) use of other medications 2015     Family history of tremor 10/17/2011     Generalized anxiety disorder 11/15/2012     Glaucoma      Hyperlipidemia 10/15/2011     Hyperparathyroidism, secondary (H) 2015     Hypertension     resolved     Immunosuppressed status (H) 3/20/2014     Major depressive disorder, recurrent episode, moderate (H) 11/15/2012     Obesity (BMI 30-39.9)      OP (osteoporosis) T score -3.8 2009 T-score -3.7      LION (obstructive sleep apnoea) 10/15/2012    intol to cpa     Pain in joint, forearm -- L unhealed Fx 2013     Premature menopause age 37 7/10/2012    OCP (vaginal bldg)-->HT which she stopped 2 mo later documented at 2007 visit (age 51).      Restless leg syndrome      Rib fractures 2013     Sensory loss 10/17/2011    Bottom of feet; uncertain if there is a neuropathy per notes.       Stiffness of joint, not elsewhere classified, hand 3/5/2013     Tremor 10/15/2011    head     Uncomplicated asthma      Patient Active Problem List   Diagnosis     Autosomal dominant polycystic kidney disease     Hypertension     Hyperlipidemia     Abnormal MRI, cervical spine     Sensory loss     Premature menopause  age 37     OP (osteoporosis) T score -3.8     LION on CPAP     Major depressive disorder, recurrent episode, moderate (H)     Generalized anxiety disorder     CMC DJD(carpometacarpal degenerative joint disease), localized primary     Pain in joint, forearm -- L unhealed Fx     -donor kidney transplant     Immunosuppressed status (H)     Hyperparathyroidism, secondary (H)     Hyperparathyroidism (H)     Senile osteoporosis     Pain in joint involving ankle and foot     Nightmares associated with chronic post-traumatic stress disorder     Type 2 diabetes mellitus with peripheral neuropathy (H)     Posttraumatic stress disorder     Right knee pain     Left knee pain     Age-related osteoporosis without current pathological fracture     Past Surgical History:   Procedure Laterality Date     ABDOMEN SURGERY       ANKLE SURGERY       C TRANSPLANTATION OF KIDNEY  3/2014     C/SECTION, LOW TRANSVERSE      x 2     CHOLECYSTECTOMY       COLONOSCOPY       ESOPHAGOSCOPY, GASTROSCOPY, DUODENOSCOPY (EGD), COMBINED N/A 2015    Procedure: COMBINED ESOPHAGOSCOPY, GASTROSCOPY, DUODENOSCOPY (EGD);  Surgeon: Sky Davey MD;  Location:  GI     ESOPHAGOSCOPY, GASTROSCOPY, DUODENOSCOPY (EGD), COMBINED N/A 2015    Procedure: COMBINED ESOPHAGOSCOPY, GASTROSCOPY, DUODENOSCOPY (EGD), BIOPSY SINGLE OR MULTIPLE;  Surgeon: Sky Davey MD;  Location:  GI     EYE SURGERY       LAPAROSCOPY, SURGICAL; REPAIR INCISIONAL OR VENTRAL HERNIA       ORTHOPEDIC SURGERY       HERMINIA EN Y BOWEL       WRIST SURGERY       Social History     Social History     Marital status:      Spouse name: Rahat     Number of children: 2     Years of education: N/A     Occupational History           part-time     Social History Main Topics     Smoking status: Former Smoker     Smokeless tobacco: Never Used     Alcohol use No     Drug use: No     Sexual activity: Yes     Partners: Male     Birth control/ protection: None       Comment: 1 partner     Other Topics Concern     Not on file     Social History Narrative     Family History   Problem Relation Age of Onset     MENTAL ILLNESS Other      family hx     HEART DISEASE Other      DIABETES Other      CANCER Other      Genetic Disorder Father      Hyperlipidemia Mother      Glaucoma No family hx of      Macular Degeneration No family hx of      Hypertension No family hx of      Lab Results   Component Value Date    A1C 6.5 05/19/2017      Lab Results   Component Value Date    WBC 3.6 11/17/2017     Lab Results   Component Value Date    RBC 4.87 11/17/2017     Lab Results   Component Value Date    HGB 13.1 11/17/2017     Lab Results   Component Value Date    HCT 42.1 11/17/2017     No components found for: MCT  Lab Results   Component Value Date    MCV 86 11/17/2017     Lab Results   Component Value Date    MCH 26.9 11/17/2017     Lab Results   Component Value Date    MCHC 31.1 11/17/2017     Lab Results   Component Value Date    RDW 14.8 11/17/2017     Lab Results   Component Value Date     11/17/2017     Last Basic Metabolic Panel:  Lab Results   Component Value Date     11/17/2017      Lab Results   Component Value Date    POTASSIUM 3.7 11/17/2017     Lab Results   Component Value Date    CHLORIDE 108 11/17/2017     Lab Results   Component Value Date    MARY 9.0 11/17/2017     Lab Results   Component Value Date    CO2 21 11/17/2017     Lab Results   Component Value Date    BUN 14 11/17/2017     Lab Results   Component Value Date    CR 0.98 11/17/2017     Lab Results   Component Value Date     11/17/2017     SUBJECTIVE FINDINGS:  A 62-year-old female returns to clinic for onychauxis and diabetic foot check.  She is diabetic with peripheral neuropathy.  Relates she has numbness and tingling and neuropathy in her feet that is better since starting weight loss medication and she has been able to stop the Gabapentin.  No ulcers or sores since I have seen her last.   She relates she is wearing her diabetic shoes and no new problems.       OBJECTIVE FINDINGS:  DP and PT are 2/4 bilaterally.  CFTs are less than 3 seconds bilaterally.  She has incurvated nails with some dystrophy and subungual debris bilaterally 1-5.  She has dorsally contracted digits 2-5 bilaterally.  She has decreased ankle joint dorsiflexion bilaterally.  There is no erythema, no drainage, no odor, no calor bilaterally.  Dry skin bilaterally.  There is some dystrophy and dryness of the nails as well.         ASSESSMENT AND PLAN:  Onychauxis bilaterally.  She is diabetic with peripheral neuropathy.  Diagnosis and treatment options were discussed with the patient.  All of the nails were reduced bilaterally upon consent.  Continue the diabetic shoes and return to clinic and see me in about 3 months.

## 2017-11-20 NOTE — MR AVS SNAPSHOT
After Visit Summary   11/20/2017    Elizabeth Palma    MRN: [id number]           Patient Information     Date Of Birth          1955        Visit Information        Provider Department      11/20/2017 1:00 PM Javier Tuttle DPM [facility]        Care Instructions    Thanks for coming today.  Ortho[facility]Sports Medicine Clinic  [address] [place] [address]    To schedule future appointments in Ortho Clinic, you may call [phone].    To schedule ordered imaging by your Provider: Call [facility] at [phone]    TerraWi available online at:   [email or web address]    Please call if any further questions or concerns [phone] and ask for the Orthopedic Department. Clinic hours 8 am to 5 pm.    Return to clinic if symptoms worsen.            Follow-ups after your visit        Follow-up notes from your care team     Return in about 3 months (around 2/20/2018).      Your next 10 appointments already scheduled     Nov 28, 2017  1:00 PM CST   Masonic Lab Draw with  MASONIC LAB DRAW   [facility] Masonic Lab Draw ([facility])    [address] [place] [id number] [phone]            Nov 28, 2017  2:00 PM CST   Infusion 60 with UC SPEC INFUSION, UC 42 ATC   [facility] Specialty [facility] Procedure ([facility])    [address] [place] [id number] [phone]            Nov 30, 2017   Procedure with Marciano Chisholm MD   [facility] [place], Endoscopy ([facility])    [address] [address] [phone]           The [facility] is located on the corner of [address] and [address] on the [facility]. It is easily accessible from virtually any point in the [place] area, via [address] and [address].            Dec 05, 2017 10:15 AM CST  "  (Arrive by 10:00 AM)   MA SCREENING DIGITAL BILATERAL with UCBCMA1   [facility] Imaging ([facility])    [address]  2nd Floor  [facility] [id number] [phone]           Do not use any powder, lotion or deodorant under your arms or on your breast. If you do, we will ask you to remove it before your exam.  Wear comfortable, two-piece clothing.  If you have any allergies, tell your care team.  Bring any previous mammograms from other facilities or have them mailed to the breast center. Three-dimensional (3D) mammograms are available at [place] locations in [facility], [facility], [facility], [place], [place], and [place]. [facility] locations include [place] and [facility] in [place]. Benefits of 3D mammograms include: - Improved rate of cancer detection - Decreases your chance of having to go back for more tests, which means fewer: - \"False-positive\" results (This means that there is an abnormal area but it isn't cancer.) - Invasive testing procedures, such as a biopsy or surgery - Can provide clearer images of the breast if you have dense breast tissue. 3D mammography is an optional exam that anyone can have with a 2D mammogram. It doesn't replace or take the place of a 2D mammogram. 2D mammograms remain an effective screening test for all women.  Not all insurance companies cover the cost of a 3D mammogram. Check with your insurance.            Dec 11, 2017 12:00 PM CST   (Arrive by 11:45 AM)   PRE-OP with Jayro Jordan MD   [facility] ([facility])    [address] [place] [address]  4th Floor  [facility] [id number] [phone]            Dec 22, 2017  9:00 AM CST   LAB with  LAB   [facility] Lab ([facility])    [address] [place] [address]  1st Floor  [facility] [id number] [phone]           Please do not eat 10-12 hours before your appointment if " you are coming in fasting for labs on lipids, cholesterol, or glucose (sugar). This does not apply to pregnant women. Water, hot tea and black coffee (with nothing added) are okay. Do not drink other fluids, diet soda or chew gum.            Dec 26, 2017  1:00 PM CST   Adult Med Follow UP with Chaparrita Hatch MD   [facility] Psychiatry ([facility])    [address]  [facility] [address]   [phone]            Dec 29, 2017  9:30 AM CST   (Arrive by 9:15 AM)   NUTRITION VISIT with Francesca Danielle RD   [facility] Surgical Weight Management ([facility])    [address]  4th Floor  [facility] [id number] [phone]            Jan 08, 2018  9:30 AM CST   (Arrive by 9:15 AM)   Return Visit with Horacio Sheridan MD   [facility] ([facility])    [address]  4th [facility] [id number] [phone]              Who to contact     If you have questions or need follow up information about today's clinic visit or your schedule please contact [facility] directly at [phone].  Normal or non-critical lab and imaging results will be communicated to you by BodyGuardzhart, letter or phone within 4 business days after the clinic has received the results. If you do not hear from us within 7 days, please contact the clinic through BodyGuardzhart or phone. If you have a critical or abnormal lab result, we will notify you by phone as soon as possible.  Submit refill requests through Ometria or call your pharmacy and they will forward the refill request to us. Please allow 3 business days for your refill to be completed.          Additional Information About Your Visit        Ometria Information     Ometria gives you secure access to your electronic health record. If you see a primary care provider, you can also send messages to your care team and make appointments. If you have questions,  please call your primary care clinic.  If you do not have a primary care provider, please call [phone] and they will assist you.      Syntaxin is an electronic gateway that provides easy, online access to your medical records. With Syntaxin, you can request a clinic appointment, read your test results, renew a prescription or communicate with your care team.     To access your existing account, please contact your [facility] or call [phone] for assistance.        Care EveryWhere ID     This is your Care EveryWhere ID. This could be used by other organizations to access your [facility] medical records  [id number]        Your Vitals Were     Pulse Pulse Oximetry                72 98%           Blood Pressure from Last 3 Encounters:   11/20/17 114/75   11/20/17 136/80   11/14/17 124/76    Weight from Last 3 Encounters:   11/20/17 74.3 kg (163 lb 12.8 oz)   11/14/17 75.6 kg (166 lb 9.6 oz)   11/10/17 75.8 kg (167 lb 1.6 oz)              Today, you had the following     No orders found for display         Today's Medication Changes          These changes are accurate as of: 11/20/17  1:28 PM.  If you have any questions, ask your nurse or doctor.               These medicines have changed or have updated prescriptions.        Dose/Directions    cyanocobalamin 1000 MCG tablet   Commonly known as:  vitamin  B-12   This may have changed:  when to take this   Used for:  CKD (chronic kidney disease) stage 5, GFR less than 15 ml/min (H)        Dose:  1000 mcg   Take 1 tablet by mouth daily.   Quantity:  30 tablet   Refills:  0       econazole nitrate 1 % cream   This may have changed:    - when to take this  - reasons to take this   Used for:  Type II or unspecified type diabetes mellitus with neurological manifestations, not stated as uncontrolled(250.60) (H), Dermatophytosis of foot        Apply topically daily   Quantity:  85 g   Refills:  3            Where to get your medicines       These medications were sent to [id number] - [place], [place] - [address] [address]     Phone:  [phone]     topiramate 200 MG tablet                Primary Care Provider Office Phone # Fax #    Horacio Sheridan [phone]       [address] [place] [address]  [facility] [address]        Equal Access to Services     LINNEA HIDALGO : Hadii aad ku hadasho Soomaali, waaxda luqadaha, qaybta kaalmada adeegyada, waxay idiin hayaan adeeg kharash lavinnie . So [facility] [place] [phone].    ATENCIÓN: Si habla español, tiene a goetz disposición servicios gratuitos de asistencia lingüística. [facility] [phone].    We comply with applicable federal civil rights laws and [place] laws. We do not discriminate on the basis of race, color, national origin, age, disability, sex, sexual orientation, or gender identity.            Thank you!     Thank you for choosing [facility]  for your care. Our goal is always to provide you with excellent care. Hearing back from our patients is one way we can continue to improve our services. Please take a few minutes to complete the written survey that you may receive in the mail after your visit with us. Thank you!             Your Updated Medication List - Protect others around you: Learn how to safely use, store and throw away your medicines at www.disposemymeds.org.          This list is accurate as of: 11/20/17  1:28 PM.  Always use your most recent med list.                   Brand Name Dispense Instructions for use Diagnosis    acetylcysteine 600 MG Caps capsule    N-ACETYL CYSTEINE    30 capsule    Take 2 400 mg caps two times daily for total daily dose of 800 mg        albuterol 108 (90 BASE) MCG/ACT Inhaler    PROAIR HFA/PROVENTIL HFA/VENTOLIN HFA    1 Inhaler    Inhale 2 puffs into the lungs every 6 hours as needed for shortness of breath / dyspnea or wheezing    Exercise-induced asthma        ARIPiprazole 2 MG tablet    ABILIFY    15 tablet    Take 0.5 tablets (1 mg) by mouth daily    Major depressive disorder, recurrent episode, moderate (H)       aspirin 81 MG EC tablet     30 tablet    Take 1 tablet (81 mg) by mouth daily    Kidney replaced by transplant       BENADRYL 25 MG capsule   Generic drug:  diphenhydrAMINE      Take 25 mg by mouth At Bedtime.        blood glucose monitoring lancets     1 Box    Use to test blood sugar 2 times daily or as directed.    Type 2 diabetes mellitus with peripheral neuropathy (H)       * blood glucose monitoring meter device kit    no brand specified    1 kit    Use to test blood sugar 2 times daily or as directed.    Type 2 diabetes mellitus with peripheral neuropathy (H)       * blood glucose monitoring meter device kit           blood glucose monitoring test strip    no brand specified    100 strip    Use to test blood sugars 2 times daily or as directed    Type 2 diabetes mellitus with peripheral neuropathy (H)       cyanocobalamin 1000 MCG tablet    vitamin  B-12    30 tablet    Take 1 tablet by mouth daily.    CKD (chronic kidney disease) stage 5, GFR less than 15 ml/min (H)       cycloSPORINE modified 25 MG capsule    GENERIC EQUIVALENT    300 capsule    Take 5 capsules (125 mg) by mouth 2 times daily    Kidney replaced by transplant       econazole nitrate 1 % cream     85 g    Apply topically daily    Type II or unspecified type diabetes mellitus with neurological manifestations, not stated as uncontrolled(250.60) (H), Dermatophytosis of foot       furosemide 20 MG tablet    LASIX    90 tablet    Take 1 tablet (20 mg) by mouth daily    Generalized edema       latanoprost 0.005 % ophthalmic solution    XALATAN    2.5 mL    Place 1 drop into both eyes At Bedtime    Mild stage glaucoma(365.71)       metFORMIN 500 MG 24 hr tablet    GLUCOPHAGE-XR    90 tablet    Take 3 tablets (1,500 mg) by mouth daily (with dinner)    Type 2 diabetes mellitus with diabetic  polyneuropathy, without long-term current use of insulin (H)       mycophenolate 250 MG capsule    GENERIC EQUIVALENT    240 capsule    Take 4 capsules (1,000 mg) by mouth 2 times daily    Kidney transplanted       omeprazole 40 MG capsule    priLOSEC    90 capsule    Take 1 capsule (40 mg) by mouth daily    Gastroesophageal reflux disease without esophagitis       ondansetron 4 MG ODT tab    ZOFRAN-ODT    20 tablet    Take 1 tablet (4 mg) by mouth every 6 hours as needed    Chronic kidney disease, stage V (H)       order for DME     1 Units    Walker with front wheels and a seat.    Fall, initial encounter       prazosin 5 MG capsule    MINIPRESS    90 capsule    Take 3 capsules (15 mg) by mouth At Bedtime    Nightmares associated with chronic post-traumatic stress disorder       REFRESH OP      Apply to eye as needed Both eyes        replens Gel     35 g    Use vaginally as needed. Can use up to 3 times per week.    Vaginal dryness       simvastatin 20 MG tablet    ZOCOR    90 tablet    Take 1 tablet (20 mg) by mouth At Bedtime    Chronic kidney disease, stage V (H)       sulfamethoxazole-trimethoprim 400-80 MG per tablet    BACTRIM/SEPTRA    90 tablet    Take 1 tablet by mouth daily    Immunosuppression (H)       topiramate 200 MG tablet    TOPAMAX    60 tablet    Take 1 tablet (200 mg) by mouth 2 times daily    Morbid obesity due to excess calories (H)       TYLENOL 325 MG tablet   Generic drug:  acetaminophen      Take 325-650 mg by mouth as needed        vilazodone 40 MG Tabs tablet    VIIBRYD    30 tablet    Take 1 tablet (40 mg) by mouth daily    Major depressive disorder, recurrent episode, moderate (H)       vitamin D 1000 UNITS capsule     30 capsule    2,000 Units        * Notice:  This list has 2 medication(s) that are the same as other medications prescribed for you. Read the directions carefully, and ask your doctor or other care provider to review them with you.

## 2017-11-20 NOTE — PROGRESS NOTES
Return Medical Weight Management Note     Elizabeth Palma  MRN:  [id number]  :  1955  AYSE:  2017    Dear Dr. Sheridan,      I had the pleasure of seeing your patient Elizabeth Palma.  She is a 60 year old female who I am continuing to see for treatment of obesity related to: DM-2, Hyperlipidemia, Sleep Apnea (has CPAP and does not use), GERD and Depression.    CURRENT WEIGHT:   163 lbs 12.8 oz    Wt Readings from Last 4 Encounters:   17 75.6 kg (166 lb 9.6 oz)   11/10/17 75.8 kg (167 lb 1.6 oz)   10/13/17 75.6 kg (166 lb 9.6 oz)   10/10/17 75.1 kg (165 lb 9.6 oz)     Body Mass Index:  There is no height or weight on file to calculate BMI.  Vitals:  B/P: 119/79, P: 60    Initial consult weight was 214 on 2015.  Weight change since last seen on 2017 is down 5 pounds.   Total loss is 51 pounds.    INTERVAL HISTORY:  Topiramate 200 AM and 200 HS along with off gabapentin, and pain is ok. Fewer problems with evening and night eating.     Diet and Activity Changes Since Last Visit Reviewed With Patient 2017   I have made the following changes to my diet since my last visit: Eat at least two meals   With regards to my diet, I am still struggling with: Protein    For breakfast, I typically eat: Nothing    For lunch, I typically eat: 1/2 sandwich    For supper, I typically eat: Small amount of protein and a starch   For snack(s), I typically eat: Small amount of non low fat chips or veggies    I have made the following changes to my activity/exercise since my last visit: Walking more   With regards to my activity/exercise, I am still struggling with: Walking more     MEDICATIONS:   Current Outpatient Prescriptions   Medication     vilazodone (VIIBRYD) 40 MG TABS tablet     prazosin (MINIPRESS) 5 MG capsule     ARIPiprazole (ABILIFY) 2 MG tablet     metFORMIN (GLUCOPHAGE-XR) 500 MG 24 hr tablet     cycloSPORINE modified (GENERIC EQUIVALENT) 25 MG capsule     topiramate (TOPAMAX) 200 MG  tablet     albuterol (PROAIR HFA/PROVENTIL HFA/VENTOLIN HFA) 108 (90 BASE) MCG/ACT Inhaler     order for DME     Vaginal Lubricant (REPLENS) GEL     furosemide (LASIX) 20 MG tablet     blood glucose monitoring (ACCU-CHEK RALPH PLUS) meter device kit     omeprazole (PRILOSEC) 40 MG capsule     simvastatin (ZOCOR) 20 MG tablet     Polyvinyl Alcohol-Povidone (REFRESH OP)     latanoprost (XALATAN) 0.005 % ophthalmic solution     blood glucose monitoring (NO BRAND SPECIFIED) meter device kit     blood glucose monitoring (NO BRAND SPECIFIED) test strip     blood glucose monitoring (SOFTCLIX) lancets     sulfamethoxazole-trimethoprim (BACTRIM/SEPTRA) 400-80 MG per tablet     mycophenolate (CELLCEPT - GENERIC EQUIVALENT) 250 MG capsule     acetylcysteine (N-ACETYL-L-CYSTEINE) 600 MG CAPS capsule     ondansetron (ZOFRAN-ODT) 4 MG disintegrating tablet     Cholecalciferol (VITAMIN D) 1000 UNITS capsule     econazole nitrate 1 % cream     aspirin EC 81 MG EC tablet     acetaminophen (TYLENOL) 325 MG tablet     cyanocolbalamin (VITAMIN  B-12) 1000 MCG tablet     diphenhydrAMINE (BENADRYL) 25 MG capsule     No current facility-administered medications for this visit.        Weight Loss Medication History Reviewed With Patient 11/16/2017   Which weight loss medications are you currently taking on a regular basis?  Topamax (topiramate)   Are you having any side effects from the weight loss medication that we have prescribed you? No   If you are having side effects please describe: -     ASSESSMENT:   Good progress this time. Maintain topiramate 200 morning and evening. Now off gabapentin with acceptable neuropathy pain control.    FOLLOW-UP:    12 weeks.  10/15 minutes spent on counseling and education    Sincerely,    Prakash Irene MD

## 2017-11-20 NOTE — NURSING NOTE
"Chief Complaint   Patient presents with     Weight Problem     RMWM       Vitals:    11/20/17 1041   BP: 136/80   Pulse: 86   SpO2: 98%   Weight: 163 lb 12.8 oz   Height: 5' 1\"       Body mass index is 30.95 kg/(m^2).  Sally Smiley CMA                        "

## 2017-11-20 NOTE — PATIENT INSTRUCTIONS
Thanks for coming today.  Ortho/Sports Medicine Clinic  [address] [place] [address]    To schedule future appointments in Ortho Clinic, you may call [phone].    To schedule ordered imaging by your Provider: Call [facility] at [phone]    Limecraft available online at:   [email or web address]    Please call if any further questions or concerns [phone] and ask for the Orthopedic Department. Clinic hours 8 am to 5 pm.    Return to clinic if symptoms worsen.

## 2017-11-20 NOTE — MR AVS SNAPSHOT
After Visit Summary   11/20/2017    Elizabeth Palma    MRN: [id number]           Patient Information     Date Of Birth          1955        Visit Information        Provider Department      11/20/2017 10:45 AM Prakash Irene MD [facility] Medical Weight Management        Today's Diagnoses     Morbid obesity due to excess calories (H)           Follow-ups after your visit        Follow-up notes from your care team     Return in about 3 months (around 2/20/2018).      Your next 10 appointments already scheduled     Nov 20, 2017  1:00 PM CST   Return Visit with Javier Tuttle DPM   [facility] ([facility])    [address] [place] [id number]   [phone]            Nov 28, 2017  1:00 PM CST   Masonic Lab Draw with UC MASONIC LAB DRAW   [facility] Masonic Lab Draw ([facility])    [address] [place] [id number] [phone]            Nov 28, 2017  2:00 PM CST   Infusion 60 with UC SPEC INFUSION, UC 42 ATC   [facility] Special[facility] Procedure ([facility])    [address] [place] [id number] [phone]            Nov 30, 2017   Procedure with Marciano Chisholm MD   [facility] [place] Endoscopy ([facility])    [address] [facility] [id number] [phone]           The [facility] is located on the corner of [facility] and [facility] on the [facility]. It is easily accessible from virtually any point in the [facility] area, via [address] and [address].            Dec 05, 2017 10:15 AM CST   (Arrive by 10:00 AM)   MA SCREENING DIGITAL BILATERAL with [id number]   [facility] Imaging ([facility])    [address] [place] [id number] [phone]           Do not use  "any powder, lotion or deodorant under your arms or on your breast. If you do, we will ask you to remove it before your exam.  Wear comfortable, two-piece clothing.  If you have any allergies, tell your care team.  Bring any previous mammograms from other facilities or have them mailed to the breast center. Three-dimensional (3D) mammograms are available at [place] locations in [facility], [facility], [facility], [facility], and [place]. [facility] locations include [place] [facility] in [place]. Benefits of 3D mammograms include: - Improved rate of cancer detection - Decreases your chance of having to go back for more tests, which means fewer: - \"False-positive\" results (This means that there is an abnormal area but it isn't cancer.) - Invasive testing procedures, such as a biopsy or surgery - Can provide clearer images of the breast if you have dense breast tissue. 3D mammography is an optional exam that anyone can have with a 2D mammogram. It doesn't replace or take the place of a 2D mammogram. 2D mammograms remain an effective screening test for all women.  Not all insurance companies cover the cost of a 3D mammogram. Check with your insurance.            Dec 11, 2017 12:00 PM CST   (Arrive by 11:45 AM)   PRE-OP with Jayro Jordan MD   [facility])    [address] [place] [id number] [phone]            Dec 22, 2017  9:00 AM CST   LAB with  LAB   [facility])    [address] [place] [id number] [phone]           Please do not eat 10-12 hours before your appointment if you are coming in fasting for labs on lipids, cholesterol, or glucose (sugar). This does not apply to pregnant women. Water, hot tea and black coffee (with nothing added) are okay. Do not drink other fluids, diet soda or chew gum.        "     Dec 26, 2017  1:00 PM CST   Adult Med Follow UP with Chaparrita Hatch MD   [facility] ([facility])    [address]  [facility] [id number] [phone]            Dec 29, 2017  9:30 AM CST   (Arrive by 9:15 AM)   NUTRITION VISIT with Francesca Danielle RD   [facility] Surgical Weight Management ([facility])    [address]  4th Floor  [facility] [address] [phone]              Who to contact     Please call your clinic at [phone] to:    Ask questions about your health    Make or cancel appointments    Discuss your medicines    Learn about your test results    Speak to your doctor   If you have compliments or concerns about an experience at your clinic, or if you wish to file a complaint, please contact [facility] Patient Relations at [phone] or email us at [email or web address]         Additional Information About Your Visit        OwingoharEpic! Information     [email or web address] gives you secure access to your electronic health record. If you see a primary care provider, you can also send messages to your care team and make appointments. If you have questions, please call your primary care clinic.  If you do not have a primary care provider, please call [phone] and they will assist you.      [email or web address] is an electronic gateway that provides easy, online access to your medical records. With [email or web address], you can request a clinic appointment, read your test results, renew a prescription or communicate with your care team.     To access your existing account, please contact your [facility] or call [phone] for assistance.        Care EveryWhere ID     This is your Care EveryWhere ID. This could be used by other organizations to access your [place] medical records  [id number]        Your Vitals Were     Pulse Height Pulse Oximetry BMI (Body Mass Index)        "   86 1.549 m (5' 1\") 98% 30.95 kg/m2         Blood Pressure from Last 3 Encounters:   11/20/17 136/80   11/14/17 124/76   11/10/17 120/80    Weight from Last 3 Encounters:   11/20/17 74.3 kg (163 lb 12.8 oz)   11/14/17 75.6 kg (166 lb 9.6 oz)   11/10/17 75.8 kg (167 lb 1.6 oz)              Today, you had the following     No orders found for display         Today's Medication Changes          These changes are accurate as of: 11/20/17 10:50 AM.  If you have any questions, ask your nurse or doctor.               These medicines have changed or have updated prescriptions.        Dose/Directions    cyanocobalamin 1000 MCG tablet   Commonly known as:  vitamin  B-12   This may have changed:  when to take this   Used for:  CKD (chronic kidney disease) stage 5, GFR less than 15 ml/min (H)        Dose:  1000 mcg   Take 1 tablet by mouth daily.   Quantity:  30 tablet   Refills:  0       econazole nitrate 1 % cream   This may have changed:    - when to take this  - reasons to take this   Used for:  Type II or unspecified type diabetes mellitus with neurological manifestations, not stated as uncontrolled(250.60) (H), Dermatophytosis of foot        Apply topically daily   Quantity:  85 g   Refills:  3            Where to get your medicines      These medications were sent to [facility][id number] [address] [address]     Phone:  [phone]     topiramate 200 MG tablet                Primary Care Provider Office Phone # Fax #    Horacio Sheridan [phone]       [address] [place] [address] [address] [place] [address]        Equal Access to Services     LUCIO HIDALGO AH: Jennifer Quevedo, radha aleman, ty perla. So [facility] [phone].    ATENCIÓN: Si habla español, tiene a goetz disposición servicios gratuitos de asistencia lingüística. Llame al [phone].    We comply with " applicable federal civil rights laws and [place] laws. We do not discriminate on the basis of race, color, national origin, age, disability, sex, sexual orientation, or gender identity.            Thank you!     Thank you for choosing [facility] MEDICAL WEIGHT MANAGEMENT  for your care. Our goal is always to provide you with excellent care. Hearing back from our patients is one way we can continue to improve our services. Please take a few minutes to complete the written survey that you may receive in the mail after your visit with us. Thank you!             Your Updated Medication List - Protect others around you: Learn how to safely use, store and throw away your medicines at www.disposemymeds.org.          This list is accurate as of: 11/20/17 10:50 AM.  Always use your most recent med list.                   Brand Name Dispense Instructions for use Diagnosis    acetylcysteine 600 MG Caps capsule    N-ACETYL CYSTEINE    30 capsule    Take 2 400 mg caps two times daily for total daily dose of 800 mg        albuterol 108 (90 BASE) MCG/ACT Inhaler    PROAIR HFA/PROVENTIL HFA/VENTOLIN HFA    1 Inhaler    Inhale 2 puffs into the lungs every 6 hours as needed for shortness of breath / dyspnea or wheezing    Exercise-induced asthma       ARIPiprazole 2 MG tablet    ABILIFY    15 tablet    Take 0.5 tablets (1 mg) by mouth daily    Major depressive disorder, recurrent episode, moderate (H)       aspirin 81 MG EC tablet     30 tablet    Take 1 tablet (81 mg) by mouth daily    Kidney replaced by transplant       BENADRYL 25 MG capsule   Generic drug:  diphenhydrAMINE      Take 25 mg by mouth At Bedtime.        blood glucose monitoring lancets     1 Box    Use to test blood sugar 2 times daily or as directed.    Type 2 diabetes mellitus with peripheral neuropathy (H)       * blood glucose monitoring meter device kit    no brand specified    1 kit    Use to test blood sugar 2 times daily or as directed.    Type 2 diabetes  mellitus with peripheral neuropathy (H)       * blood glucose monitoring meter device kit           blood glucose monitoring test strip    no brand specified    100 strip    Use to test blood sugars 2 times daily or as directed    Type 2 diabetes mellitus with peripheral neuropathy (H)       cyanocobalamin 1000 MCG tablet    vitamin  B-12    30 tablet    Take 1 tablet by mouth daily.    CKD (chronic kidney disease) stage 5, GFR less than 15 ml/min (H)       cycloSPORINE modified 25 MG capsule    GENERIC EQUIVALENT    300 capsule    Take 5 capsules (125 mg) by mouth 2 times daily    Kidney replaced by transplant       econazole nitrate 1 % cream     85 g    Apply topically daily    Type II or unspecified type diabetes mellitus with neurological manifestations, not stated as uncontrolled(250.60) (H), Dermatophytosis of foot       furosemide 20 MG tablet    LASIX    90 tablet    Take 1 tablet (20 mg) by mouth daily    Generalized edema       latanoprost 0.005 % ophthalmic solution    XALATAN    2.5 mL    Place 1 drop into both eyes At Bedtime    Mild stage glaucoma(365.71)       metFORMIN 500 MG 24 hr tablet    GLUCOPHAGE-XR    90 tablet    Take 3 tablets (1,500 mg) by mouth daily (with dinner)    Type 2 diabetes mellitus with diabetic polyneuropathy, without long-term current use of insulin (H)       mycophenolate 250 MG capsule    GENERIC EQUIVALENT    240 capsule    Take 4 capsules (1,000 mg) by mouth 2 times daily    Kidney transplanted       omeprazole 40 MG capsule    priLOSEC    90 capsule    Take 1 capsule (40 mg) by mouth daily    Gastroesophageal reflux disease without esophagitis       ondansetron 4 MG ODT tab    ZOFRAN-ODT    20 tablet    Take 1 tablet (4 mg) by mouth every 6 hours as needed    Chronic kidney disease, stage V (H)       order for DME     1 Units    Walker with front wheels and a seat.    Fall, initial encounter       prazosin 5 MG capsule    MINIPRESS    90 capsule    Take 3 capsules (15 mg)  by mouth At Bedtime    Nightmares associated with chronic post-traumatic stress disorder       REFRESH OP      Apply to eye as needed Both eyes        replens Gel     35 g    Use vaginally as needed. Can use up to 3 times per week.    Vaginal dryness       simvastatin 20 MG tablet    ZOCOR    90 tablet    Take 1 tablet (20 mg) by mouth At Bedtime    Chronic kidney disease, stage V (H)       sulfamethoxazole-trimethoprim 400-80 MG per tablet    BACTRIM/SEPTRA    90 tablet    Take 1 tablet by mouth daily    Immunosuppression (H)       topiramate 200 MG tablet    TOPAMAX    60 tablet    Take 1 tablet (200 mg) by mouth 2 times daily    Morbid obesity due to excess calories (H)       TYLENOL 325 MG tablet   Generic drug:  acetaminophen      Take 325-650 mg by mouth as needed        vilazodone 40 MG Tabs tablet    VIIBRYD    30 tablet    Take 1 tablet (40 mg) by mouth daily    Major depressive disorder, recurrent episode, moderate (H)       vitamin D 1000 UNITS capsule     30 capsule    2,000 Units        * Notice:  This list has 2 medication(s) that are the same as other medications prescribed for you. Read the directions carefully, and ask your doctor or other care provider to review them with you.

## 2017-11-20 NOTE — NURSING NOTE
"Elizabeth Palma's goals for this visit include: Diabetic foot check   She requests these members of her care team be copied on today's visit information: yes    PCP: Horacio Sheridan    Referring Provider:  No referring provider defined for this encounter.    Chief Complaint   Patient presents with     RECHECK     Diabetic foot check        Initial /75  Pulse 72  SpO2 98% Estimated body mass index is 30.95 kg/(m^2) as calculated from the following:    Height as of an earlier encounter on 11/20/17: 1.549 m (5' 1\").    Weight as of an earlier encounter on 11/20/17: 74.3 kg (163 lb 12.8 oz).  Medication Reconciliation: complete    "

## 2017-11-20 NOTE — LETTER
2017       RE: Elizabeth Palma  [address]     [place] [address]     Dear Colleague,    Thank you for referring your patient, Elizabeth Palma, to the [facility] at [facility]. Please see a copy of my visit note below.        Return Medical Weight Management Note     Elizabeth Palma  MRN:  [id number]  :  1955  AYSE:  2017    Dear Dr. Sheridan,      I had the pleasure of seeing your patient Elizabeth Palma.  She is a 60 year old female who I am continuing to see for treatment of obesity related to: DM-2, Hyperlipidemia, Sleep Apnea (has CPAP and does not use), GERD and Depression.    CURRENT WEIGHT:   163 lbs 12.8 oz    Wt Readings from Last 4 Encounters:   17 75.6 kg (166 lb 9.6 oz)   11/10/17 75.8 kg (167 lb 1.6 oz)   10/13/17 75.6 kg (166 lb 9.6 oz)   10/10/17 75.1 kg (165 lb 9.6 oz)     Body Mass Index:  There is no height or weight on file to calculate BMI.  Vitals:  B/P: 119/79, P: 60    Initial consult weight was 214 on 2015.  Weight change since last seen on 2017 is down 5 pounds.   Total loss is 51 pounds.    INTERVAL HISTORY:  Topiramate 200 AM and 200 HS along with off gabapentin, and pain is ok. Fewer problems with evening and night eating.     Diet and Activity Changes Since Last Visit Reviewed With Patient 2017   I have made the following changes to my diet since my last visit: Eat at least two meals   With regards to my diet, I am still struggling with: Protein    For breakfast, I typically eat: Nothing    For lunch, I typically eat: 1/2 sandwich    For supper, I typically eat: Small amount of protein and a starch   For snack(s), I typically eat: Small amount of non low fat chips or veggies    I have made the following changes to my activity/exercise since my last visit: Walking more   With regards to my activity/exercise, I am still struggling with: Walking more      MEDICATIONS:   Current Outpatient Prescriptions   Medication     vilazodone (VIIBRYD) 40 MG TABS tablet     prazosin (MINIPRESS) 5 MG capsule     ARIPiprazole (ABILIFY) 2 MG tablet     metFORMIN (GLUCOPHAGE-XR) 500 MG 24 hr tablet     cycloSPORINE modified (GENERIC EQUIVALENT) 25 MG capsule     topiramate (TOPAMAX) 200 MG tablet     albuterol (PROAIR HFA/PROVENTIL HFA/VENTOLIN HFA) 108 (90 BASE) MCG/ACT Inhaler     order for DME     Vaginal Lubricant (REPLENS) GEL     furosemide (LASIX) 20 MG tablet     blood glucose monitoring (ACCU-CHEK RALPH PLUS) meter device kit     omeprazole (PRILOSEC) 40 MG capsule     simvastatin (ZOCOR) 20 MG tablet     Polyvinyl Alcohol-Povidone (REFRESH OP)     latanoprost (XALATAN) 0.005 % ophthalmic solution     blood glucose monitoring (NO BRAND SPECIFIED) meter device kit     blood glucose monitoring (NO BRAND SPECIFIED) test strip     blood glucose monitoring (SOFTCLIX) lancets     sulfamethoxazole-trimethoprim (BACTRIM/SEPTRA) 400-80 MG per tablet     mycophenolate (CELLCEPT - GENERIC EQUIVALENT) 250 MG capsule     acetylcysteine (N-ACETYL-L-CYSTEINE) 600 MG CAPS capsule     ondansetron (ZOFRAN-ODT) 4 MG disintegrating tablet     Cholecalciferol (VITAMIN D) 1000 UNITS capsule     econazole nitrate 1 % cream     aspirin EC 81 MG EC tablet     acetaminophen (TYLENOL) 325 MG tablet     cyanocolbalamin (VITAMIN  B-12) 1000 MCG tablet     diphenhydrAMINE (BENADRYL) 25 MG capsule     No current facility-administered medications for this visit.        Weight Loss Medication History Reviewed With Patient 11/16/2017   Which weight loss medications are you currently taking on a regular basis?  Topamax (topiramate)   Are you having any side effects from the weight loss medication that we have prescribed you? No   If you are having side effects please describe: -     ASSESSMENT:   Good progress this time. Maintain topiramate 200 morning and evening. Now off gabapentin with acceptable  neuropathy pain control.    FOLLOW-UP:    12 weeks.  10/15 minutes spent on counseling and education    Sincerely,    Prakash Irene MD

## 2017-11-24 ENCOUNTER — TELEPHONE (OUTPATIENT)
Dept: GASTROENTEROLOGY | Facility: CLINIC | Age: 62
End: 2017-11-24

## 2017-11-24 DIAGNOSIS — Z12.11 ENCOUNTER FOR SCREENING COLONOSCOPY: Primary | ICD-10-CM

## 2017-11-24 NOTE — TELEPHONE ENCOUNTER
Patient scheduled for Colonoscopy    Indication for procedure. Special screening for malignant neoplasms, colon    Referring Provider. Horacio Sheridan MD    ? Not Needed    Arrival time verified? Yes, 1230    Facility location verified? Yes, [facility]    Instructions given regarding prep and procedure    Prep Type Golytely    Are you taking any anticoagulants or blood thinners? No    Instructions given? N/A    Electronic implanted devices? No    Pre procedure teaching completed? Yes    Transportation from procedure?     H&P / Pre op physical completed? Completed on 11/10/17

## 2017-11-28 ENCOUNTER — APPOINTMENT (OUTPATIENT)
Dept: LAB | Facility: CLINIC | Age: 62
End: 2017-11-28
Attending: INTERNAL MEDICINE
Payer: MEDICARE

## 2017-11-28 ENCOUNTER — INFUSION THERAPY VISIT (OUTPATIENT)
Dept: INFUSION THERAPY | Facility: CLINIC | Age: 62
End: 2017-11-28
Attending: INTERNAL MEDICINE
Payer: MEDICARE

## 2017-11-28 VITALS
DIASTOLIC BLOOD PRESSURE: 72 MMHG | OXYGEN SATURATION: 98 % | SYSTOLIC BLOOD PRESSURE: 129 MMHG | BODY MASS INDEX: 31.21 KG/M2 | TEMPERATURE: 98.3 F | RESPIRATION RATE: 16 BRPM | WEIGHT: 165.2 LBS | HEART RATE: 67 BPM

## 2017-11-28 DIAGNOSIS — M81.0 SENILE OSTEOPOROSIS: ICD-10-CM

## 2017-11-28 DIAGNOSIS — N25.81 HYPERPARATHYROIDISM, SECONDARY (H): ICD-10-CM

## 2017-11-28 DIAGNOSIS — M81.0 AGE-RELATED OSTEOPOROSIS WITHOUT CURRENT PATHOLOGICAL FRACTURE: Primary | ICD-10-CM

## 2017-11-28 LAB
CALCIUM SERPL-MCNC: 9.2 MG/DL (ref 8.5–10.1)
CREAT SERPL-MCNC: 0.98 MG/DL (ref 0.52–1.04)
GFR SERPL CREATININE-BSD FRML MDRD: 58 ML/MIN/1.7M2

## 2017-11-28 PROCEDURE — 82565 ASSAY OF CREATININE: CPT | Performed by: INTERNAL MEDICINE

## 2017-11-28 PROCEDURE — 25000128 H RX IP 250 OP 636: Mod: ZF | Performed by: INTERNAL MEDICINE

## 2017-11-28 PROCEDURE — 82310 ASSAY OF CALCIUM: CPT | Performed by: INTERNAL MEDICINE

## 2017-11-28 PROCEDURE — 96365 THER/PROPH/DIAG IV INF INIT: CPT

## 2017-11-28 RX ORDER — ZOLEDRONIC ACID 5 MG/100ML
5 INJECTION, SOLUTION INTRAVENOUS ONCE
Status: CANCELLED
Start: 2017-11-28 | End: 2017-11-28

## 2017-11-28 RX ORDER — ZOLEDRONIC ACID 5 MG/100ML
5 INJECTION, SOLUTION INTRAVENOUS ONCE
Status: COMPLETED | OUTPATIENT
Start: 2017-11-28 | End: 2017-11-28

## 2017-11-28 RX ADMIN — ZOLEDRONIC ACID 5 MG: 0.05 INJECTION, SOLUTION INTRAVENOUS at 14:09

## 2017-11-28 ASSESSMENT — PAIN SCALES - GENERAL: PAINLEVEL: SEVERE PAIN (7)

## 2017-11-28 NOTE — PROGRESS NOTES
Nursing Note  [facility] Pal[facility]:   Chief Complaint   Patient presents with     Blood Draw     Labs drawn from PIV placed by RN. Line flushed with saline. Vs taken and pt checked in for appt     Infusion     reclast     During today's [facility] appointment, orders from Dr. Byers were completed.  Frequency: once yearly    Progress note:  Patient identification verified by name and date of birth.  Assessment completed.  Vitals recorded in Doc Flowsheets.  Patient was provided with education regarding infusion and possible side effects.  Patient verbalized understanding.      needed: No  Premedications: were not ordered.  Infusion Rates: over 20 minutes  Labs: were drawn prior to appointment at lab.  Vascular access: peripheral IV placed today during lab and peripheral IV was placed at vascular access.  Treatment Conditions: patient s Creatinine Clearance is within paramaters to administer medication per orders.  Patient tolerated infusion: well.    Discharge Plan:   Follow up plan of care with: ongoing infusions at [facility].  Discharge instructions were reviewed with patient.  Patient/representative verbalized understanding of discharge instructions and all questions answered.  Patient discharged from [facility] in stable condition.    Chantell Avelar RN    Administrations This Visit     zoledronic Acid (RECLAST) infusion 5 mg     Admin Date Action Dose Rate Route Administered By          11/28/2017 New Bag 5 mg 400 mL/hr Intravenous Chantell Avelar RN                         /72 (BP Location: Right arm, Cuff Size: Adult Regular)  Pulse 67  Temp 98.3  F (36.8  C) (Oral)  Resp 16  Wt 74.9 kg (165 lb 3.2 oz)  SpO2 98%  BMI 31.21 kg/m2

## 2017-11-28 NOTE — MR AVS SNAPSHOT
After Visit Summary   11/28/2017    Elizabeth Palma    MRN: [id number]           Patient Information     Date Of Birth          1955        Visit Information        Provider Department      11/28/2017 2:00 PM SHANNAN 42 ATC; [facility] [place] Specialty [facility] Procedure        Today's Diagnoses     Age-related osteoporosis without current pathological fracture    -  1    Senile osteoporosis        Hyperparathyroidism, secondary (H)           Follow-ups after your visit        Your next 10 appointments already scheduled     Nov 30, 2017   Procedure with Marciano Chisholm MD   [facility], [place], [facility] ([facility], [facility])    [address] [address]   [phone]           [facility] is located on the corner of [address] and [address] on the [address]. It is easily accessible from virtually any point in [place] area, via [address].            Dec 05, 2017 10:15 AM CST   (Arrive by 10:00 AM)   MA SCREENING DIGITAL BILATERAL with [email or web address]   [facility] ([facility])    [address]  2nd Floor  [facility] [id number] [phone]           Do not use any powder, lotion or deodorant under your arms or on your breast. If you do, we will ask you to remove it before your exam.  Wear comfortable, two-piece clothing.  If you have any allergies, tell your care team.  Bring any previous mammograms from other facilities or have them mailed to the breast center. Three-dimensional (3D) mammograms are available at [place] locations in [facility], [place], [place], [facility], [place], [place], and [place]. [facility] locations include [place] [facility] in [place]. Benefits of 3D mammograms include: - Improved rate of cancer detection - Decreases your chance of having to go  "back for more tests, which means fewer: - \"False-positive\" results (This means that there is an abnormal area but it isn't cancer.) - Invasive testing procedures, such as a biopsy or surgery - Can provide clearer images of the breast if you have dense breast tissue. 3D mammography is an optional exam that anyone can have with a 2D mammogram. It doesn't replace or take the place of a 2D mammogram. 2D mammograms remain an effective screening test for all women.  Not all insurance companies cover the cost of a 3D mammogram. Check with your insurance.            Dec 11, 2017 12:00 PM CST   (Arrive by 11:45 AM)   PRE-OP with Jayro Jordan MD   [facility] ([facility] [place])    [address] [place] [address]  4th [facility] [id number] [phone]            Dec 26, 2017  1:00 PM CST   Adult Med Follow UP with Chaparrita Hatch MD   [facility] ([facility])    [address]  [facility] [address] [phone]            Dec 29, 2017  9:00 AM CST   LAB with  LAB   [facility] ([facility])    [address]  1st Floor  [facility] [id number] [phone]           Please do not eat 10-12 hours before your appointment if you are coming in fasting for labs on lipids, cholesterol, or glucose (sugar). This does not apply to pregnant women. Water, hot tea and black coffee (with nothing added) are okay. Do not drink other fluids, diet soda or chew gum.            Dec 29, 2017  9:30 AM CST   (Arrive by 9:15 AM)   NUTRITION VISIT with Francesca Danielle RD   [facility] Management ([facility])    [address]  4th Floor  [facility] [id number] [phone]            Jan 08, 2018  9:30 AM CST   (Arrive by 9:15 AM)   Return Visit with Horacio Sheridan MD   [facility] ([facility] [place])    [address] [place] [address]  4th " [facility] [id number] [phone]            Jan 19, 2018  9:00 AM CST   LAB with  LAB   [facility] ([facility])    [address] [place] [id number] [phone]           Please do not eat 10-12 hours before your appointment if you are coming in fasting for labs on lipids, cholesterol, or glucose (sugar). This does not apply to pregnant women. Water, hot tea and black coffee (with nothing added) are okay. Do not drink other fluids, diet soda or chew gum.            Jan 19, 2018  9:30 AM CST   (Arrive by 9:15 AM)   NUTRITION VISIT with Francesca Danielle RD   [facility] ([facility])    [address] [place] [id number] [phone]            Feb 16, 2018  9:00 AM CST   LAB with  LAB   [facility] ([facility])    [address] [place] [id number] [phone]           Please do not eat 10-12 hours before your appointment if you are coming in fasting for labs on lipids, cholesterol, or glucose (sugar). This does not apply to pregnant women. Water, hot tea and black coffee (with nothing added) are okay. Do not drink other fluids, diet soda or chew gum.              Who to contact     If you have questions or need follow up information about today's clinic visit or your schedule please contact [facility] SPECIALTY AND PROCEDURE directly at [phone].  Normal or non-critical lab and imaging results will be communicated to you by MyChart, letter or phone within 4 business days after the clinic has received the results. If you do not hear from us within 7 days, please contact the clinic through MyChart or phone. If you have a critical or abnormal lab result, we will notify you by phone as soon as possible.  Submit refill requests through [employer] or call your pharmacy and they will forward  the refill request to us. Please allow 3 business days for your refill to be completed.          Additional Information About Your Visit        [employer] Information     [employer] gives you secure access to your electronic health record. If you see a primary care provider, you can also send messages to your care team and make appointments. If you have questions, please call your primary care clinic.  If you do not have a primary care provider, please call [phone] and they will assist you.        Care EveryWhere ID     This is your Care EveryWhere ID. This could be used by other organizations to access your [place] medical records  [id number]        Your Vitals Were     Pulse Temperature Respirations Pulse Oximetry BMI (Body Mass Index)       67 98.3  F (36.8  C) (Oral) 16 98% 31.21 kg/m2        Blood Pressure from Last 3 Encounters:   11/28/17 129/72   11/20/17 114/75   11/20/17 136/80    Weight from Last 3 Encounters:   11/28/17 74.9 kg (165 lb 3.2 oz)   11/20/17 74.3 kg (163 lb 12.8 oz)   11/14/17 75.6 kg (166 lb 9.6 oz)              We Performed the Following     Calcium     Creatinine          Today's Medication Changes          These changes are accurate as of: 11/28/17  2:42 PM.  If you have any questions, ask your nurse or doctor.               These medicines have changed or have updated prescriptions.        Dose/Directions    cyanocobalamin 1000 MCG tablet   Commonly known as:  vitamin  B-12   This may have changed:  when to take this   Used for:  CKD (chronic kidney disease) stage 5, GFR less than 15 ml/min (H)        Dose:  1000 mcg   Take 1 tablet by mouth daily.   Quantity:  30 tablet   Refills:  0       econazole nitrate 1 % cream   This may have changed:    - when to take this  - reasons to take this   Used for:  Type II or unspecified type diabetes mellitus with neurological manifestations, not stated as uncontrolled(250.60) (H), Dermatophytosis of foot        Apply topically daily   Quantity:   85 g   Refills:  3                Primary Care Provider Office Phone # Fax #    oHracio Sheridan [phone]       [address] [place] [address] [address] [place] [address]        Equal Access to Services     LINNEA HIDALGO : Jennifer lehman rober Somartín, waaxda luqadaha, qaybta kaalmada aderobbieyada, ty padilla tanja fam. So [facility] [phone].    ATENCIÓN: Si habla español, tiene a goetz disposición servicios gratuitos de asistencia lingüística. [facility] [phone].    We comply with applicable federal civil rights laws and [place] laws. We do not discriminate on the basis of race, color, national origin, age, disability, sex, sexual orientation, or gender identity.            Thank you!     Thank you for choosing [facility] SPECIALTY [facility] PROCEDURE  for your care. Our goal is always to provide you with excellent care. Hearing back from our patients is one way we can continue to improve our services. Please take a few minutes to complete the written survey that you may receive in the mail after your visit with us. Thank you!             Your Updated Medication List - Protect others around you: Learn how to safely use, store and throw away your medicines at www.disposemymeds.org.          This list is accurate as of: 11/28/17  2:42 PM.  Always use your most recent med list.                   Brand Name Dispense Instructions for use Diagnosis    acetylcysteine 600 MG Caps capsule    N-ACETYL CYSTEINE    30 capsule    Take 2 400 mg caps two times daily for total daily dose of 800 mg        albuterol 108 (90 BASE) MCG/ACT Inhaler    PROAIR HFA/PROVENTIL HFA/VENTOLIN HFA    1 Inhaler    Inhale 2 puffs into the lungs every 6 hours as needed for shortness of breath / dyspnea or wheezing    Exercise-induced asthma       ARIPiprazole 2 MG tablet    ABILIFY    15 tablet    Take 0.5 tablets (1 mg) by mouth daily    Major depressive disorder, recurrent episode, moderate (H)        aspirin 81 MG EC tablet     30 tablet    Take 1 tablet (81 mg) by mouth daily    Kidney replaced by transplant       BENADRYL 25 MG capsule   Generic drug:  diphenhydrAMINE      Take 25 mg by mouth At Bedtime.        blood glucose monitoring lancets     1 Box    Use to test blood sugar 2 times daily or as directed.    Type 2 diabetes mellitus with peripheral neuropathy (H)       * blood glucose monitoring meter device kit    no brand specified    1 kit    Use to test blood sugar 2 times daily or as directed.    Type 2 diabetes mellitus with peripheral neuropathy (H)       * blood glucose monitoring meter device kit           blood glucose monitoring test strip    no brand specified    100 strip    Use to test blood sugars 2 times daily or as directed    Type 2 diabetes mellitus with peripheral neuropathy (H)       cyanocobalamin 1000 MCG tablet    vitamin  B-12    30 tablet    Take 1 tablet by mouth daily.    CKD (chronic kidney disease) stage 5, GFR less than 15 ml/min (H)       cycloSPORINE modified 25 MG capsule    GENERIC EQUIVALENT    300 capsule    Take 5 capsules (125 mg) by mouth 2 times daily    Kidney replaced by transplant       econazole nitrate 1 % cream     85 g    Apply topically daily    Type II or unspecified type diabetes mellitus with neurological manifestations, not stated as uncontrolled(250.60) (H), Dermatophytosis of foot       furosemide 20 MG tablet    LASIX    90 tablet    Take 1 tablet (20 mg) by mouth daily    Generalized edema       latanoprost 0.005 % ophthalmic solution    XALATAN    2.5 mL    Place 1 drop into both eyes At Bedtime    Mild stage glaucoma(365.71)       metFORMIN 500 MG 24 hr tablet    GLUCOPHAGE-XR    90 tablet    Take 3 tablets (1,500 mg) by mouth daily (with dinner)    Type 2 diabetes mellitus with diabetic polyneuropathy, without long-term current use of insulin (H)       mycophenolate 250 MG capsule    GENERIC EQUIVALENT    240 capsule    Take 4 capsules (1,000 mg)  by mouth 2 times daily    Kidney transplanted       omeprazole 40 MG capsule    priLOSEC    90 capsule    Take 1 capsule (40 mg) by mouth daily    Gastroesophageal reflux disease without esophagitis       ondansetron 4 MG ODT tab    ZOFRAN-ODT    20 tablet    Take 1 tablet (4 mg) by mouth every 6 hours as needed    Chronic kidney disease, stage V (H)       order for DME     1 Units    Walker with front wheels and a seat.    Fall, initial encounter       prazosin 5 MG capsule    MINIPRESS    90 capsule    Take 3 capsules (15 mg) by mouth At Bedtime    Nightmares associated with chronic post-traumatic stress disorder       REFRESH OP      Apply to eye as needed Both eyes        replens Gel     35 g    Use vaginally as needed. Can use up to 3 times per week.    Vaginal dryness       simvastatin 20 MG tablet    ZOCOR    90 tablet    Take 1 tablet (20 mg) by mouth At Bedtime    Chronic kidney disease, stage V (H)       sulfamethoxazole-trimethoprim 400-80 MG per tablet    BACTRIM/SEPTRA    90 tablet    Take 1 tablet by mouth daily    Immunosuppression (H)       topiramate 200 MG tablet    TOPAMAX    60 tablet    Take 1 tablet (200 mg) by mouth 2 times daily    Morbid obesity due to excess calories (H)       TYLENOL 325 MG tablet   Generic drug:  acetaminophen      Take 325-650 mg by mouth as needed        vilazodone 40 MG Tabs tablet    VIIBRYD    30 tablet    Take 1 tablet (40 mg) by mouth daily    Major depressive disorder, recurrent episode, moderate (H)       vitamin D 1000 UNITS capsule     30 capsule    2,000 Units        * Notice:  This list has 2 medication(s) that are the same as other medications prescribed for you. Read the directions carefully, and ask your doctor or other care provider to review them with you.

## 2017-11-28 NOTE — NURSING NOTE
Chief Complaint   Patient presents with     Blood Draw     Labs drawn from PIV placed by RN. Line flushed with saline. Vs taken and pt checked in for appt     Labs drawn from PIV placed by RN. Line flushed with saline. Vitals taken. Pt checked in for appointment(s).    hCarla Dennis RN

## 2017-11-30 ENCOUNTER — ANESTHESIA (OUTPATIENT)
Dept: GASTROENTEROLOGY | Facility: CLINIC | Age: 62
End: 2017-11-30
Payer: MEDICARE

## 2017-11-30 ENCOUNTER — HOSPITAL ENCOUNTER (OUTPATIENT)
Facility: CLINIC | Age: 62
Discharge: HOME | End: 2017-11-30
Attending: INTERNAL MEDICINE | Admitting: INTERNAL MEDICINE
Payer: MEDICARE

## 2017-11-30 ENCOUNTER — SURGERY (OUTPATIENT)
Age: 62
End: 2017-11-30

## 2017-11-30 ENCOUNTER — ANESTHESIA EVENT (OUTPATIENT)
Dept: GASTROENTEROLOGY | Facility: CLINIC | Age: 62
End: 2017-11-30
Payer: MEDICARE

## 2017-11-30 VITALS
OXYGEN SATURATION: 98 % | WEIGHT: 164 LBS | TEMPERATURE: 98.3 F | SYSTOLIC BLOOD PRESSURE: 104 MMHG | BODY MASS INDEX: 32.2 KG/M2 | DIASTOLIC BLOOD PRESSURE: 64 MMHG | RESPIRATION RATE: 20 BRPM | HEIGHT: 60 IN

## 2017-11-30 LAB
COLONOSCOPY: NORMAL
GLUCOSE BLDC GLUCOMTR-MCNC: 121 MG/DL (ref 70–99)

## 2017-11-30 PROCEDURE — 37000009 ZZH ANESTHESIA TECHNICAL FEE, EACH ADDTL 15 MIN: Performed by: INTERNAL MEDICINE

## 2017-11-30 PROCEDURE — 82962 GLUCOSE BLOOD TEST: CPT

## 2017-11-30 PROCEDURE — 25000128 H RX IP 250 OP 636: Performed by: NURSE ANESTHETIST, CERTIFIED REGISTERED

## 2017-11-30 PROCEDURE — 45385 COLONOSCOPY W/LESION REMOVAL: CPT | Mod: PT | Performed by: INTERNAL MEDICINE

## 2017-11-30 PROCEDURE — 88305 TISSUE EXAM BY PATHOLOGIST: CPT | Performed by: INTERNAL MEDICINE

## 2017-11-30 PROCEDURE — 37000008 ZZH ANESTHESIA TECHNICAL FEE, 1ST 30 MIN: Performed by: INTERNAL MEDICINE

## 2017-11-30 PROCEDURE — 25000132 ZZH RX MED GY IP 250 OP 250 PS 637: Mod: GY | Performed by: INTERNAL MEDICINE

## 2017-11-30 PROCEDURE — 25000125 ZZHC RX 250: Performed by: NURSE ANESTHETIST, CERTIFIED REGISTERED

## 2017-11-30 RX ORDER — NALOXONE HYDROCHLORIDE 0.4 MG/ML
.1-.4 INJECTION, SOLUTION INTRAMUSCULAR; INTRAVENOUS; SUBCUTANEOUS
Status: CANCELLED | OUTPATIENT
Start: 2017-11-30 | End: 2017-12-01

## 2017-11-30 RX ORDER — LIDOCAINE HYDROCHLORIDE 20 MG/ML
INJECTION, SOLUTION INFILTRATION; PERINEURAL PRN
Status: DISCONTINUED | OUTPATIENT
Start: 2017-11-30 | End: 2017-11-30

## 2017-11-30 RX ORDER — LIDOCAINE 40 MG/G
CREAM TOPICAL
Status: DISCONTINUED | OUTPATIENT
Start: 2017-11-30 | End: 2017-11-30 | Stop reason: HOSPADM

## 2017-11-30 RX ORDER — SODIUM CHLORIDE 9 MG/ML
INJECTION, SOLUTION INTRAVENOUS CONTINUOUS PRN
Status: DISCONTINUED | OUTPATIENT
Start: 2017-11-30 | End: 2017-11-30

## 2017-11-30 RX ORDER — MEPERIDINE HYDROCHLORIDE 25 MG/ML
12.5 INJECTION INTRAMUSCULAR; INTRAVENOUS; SUBCUTANEOUS
Status: CANCELLED | OUTPATIENT
Start: 2017-11-30

## 2017-11-30 RX ORDER — PROPOFOL 10 MG/ML
INJECTION, EMULSION INTRAVENOUS PRN
Status: DISCONTINUED | OUTPATIENT
Start: 2017-11-30 | End: 2017-11-30

## 2017-11-30 RX ORDER — ONDANSETRON 2 MG/ML
4 INJECTION INTRAMUSCULAR; INTRAVENOUS EVERY 30 MIN PRN
Status: CANCELLED | OUTPATIENT
Start: 2017-11-30

## 2017-11-30 RX ORDER — ONDANSETRON 2 MG/ML
4 INJECTION INTRAMUSCULAR; INTRAVENOUS
Status: DISCONTINUED | OUTPATIENT
Start: 2017-11-30 | End: 2017-11-30 | Stop reason: HOSPADM

## 2017-11-30 RX ORDER — EPHEDRINE SULFATE 50 MG/ML
INJECTION, SOLUTION INTRAMUSCULAR; INTRAVENOUS; SUBCUTANEOUS PRN
Status: DISCONTINUED | OUTPATIENT
Start: 2017-11-30 | End: 2017-11-30

## 2017-11-30 RX ORDER — SODIUM CHLORIDE, SODIUM LACTATE, POTASSIUM CHLORIDE, CALCIUM CHLORIDE 600; 310; 30; 20 MG/100ML; MG/100ML; MG/100ML; MG/100ML
INJECTION, SOLUTION INTRAVENOUS CONTINUOUS
Status: CANCELLED | OUTPATIENT
Start: 2017-11-30

## 2017-11-30 RX ORDER — PROPOFOL 10 MG/ML
INJECTION, EMULSION INTRAVENOUS CONTINUOUS PRN
Status: DISCONTINUED | OUTPATIENT
Start: 2017-11-30 | End: 2017-11-30

## 2017-11-30 RX ORDER — SIMETHICONE
LIQUID (ML) MISCELLANEOUS PRN
Status: DISCONTINUED | OUTPATIENT
Start: 2017-11-30 | End: 2017-11-30 | Stop reason: HOSPADM

## 2017-11-30 RX ORDER — ONDANSETRON 4 MG/1
4 TABLET, ORALLY DISINTEGRATING ORAL EVERY 30 MIN PRN
Status: CANCELLED | OUTPATIENT
Start: 2017-11-30

## 2017-11-30 RX ADMIN — PROPOFOL 20 MG: 10 INJECTION, EMULSION INTRAVENOUS at 14:51

## 2017-11-30 RX ADMIN — Medication 2 ML: at 14:51

## 2017-11-30 RX ADMIN — SODIUM CHLORIDE: 9 INJECTION, SOLUTION INTRAVENOUS at 14:44

## 2017-11-30 RX ADMIN — PROPOFOL 100 MCG/KG/MIN: 10 INJECTION, EMULSION INTRAVENOUS at 14:48

## 2017-11-30 RX ADMIN — Medication 7.5 MG: at 14:59

## 2017-11-30 RX ADMIN — LIDOCAINE HYDROCHLORIDE 80 MG: 20 INJECTION, SOLUTION INFILTRATION; PERINEURAL at 14:48

## 2017-11-30 NOTE — IP AVS SNAPSHOT
MRN:[id number]                      After Visit Summary   11/30/2017    Elizabeth Palma    MRN: [id number]           Thank you!     Thank you for choosing [facility] for your care. Our goal is always to provide you with excellent care. Hearing back from our patients is one way we can continue to improve our services. Please take a few minutes to complete the written survey that you may receive in the mail after you visit with us. Thank you!        Patient Information     Date Of Birth          1955        About your hospital stay     You were admitted on:  November 30, 2017 You last received care in the:  [facility], Endoscopy    You were discharged on:  November 30, 2017       Who to Call     For medical emergencies, please call 911.  For non-urgent questions about your medical care, please call your primary care provider or clinic, [phone]  For questions related to your surgery, please call your surgery clinic        Attending Provider     Provider Specialty    Marciano Chester MD Gastroenterology       Primary Care Provider Office Phone # Fax #    Horacio Sheridan [phone]      Your next 10 appointments already scheduled     Dec 05, 2017 10:15 AM CST   (Arrive by 10:00 AM)   MA SCREENING DIGITAL BILATERAL with [id number]   [facility] ([facility])    [address] [place] [address] [phone]           Do not use any powder, lotion or deodorant under your arms or on your breast. If you do, we will ask you to remove it before your exam.  Wear comfortable, two-piece clothing.  If you have any allergies, tell your care team.  Bring any previous mammograms from other facilities or have them mailed to the breast center. Three-dimensional (3D) mammograms are available at [facility] locations in [place], [place], [place], [place], [facility], [place], [place], and [place]. [facility]  "locations include [facility] in [place]. Benefits of 3D mammograms include: - Improved rate of cancer detection - Decreases your chance of having to go back for more tests, which means fewer: - \"False-positive\" results (This means that there is an abnormal area but it isn't cancer.) - Invasive testing procedures, such as a biopsy or surgery - Can provide clearer images of the breast if you have dense breast tissue. 3D mammography is an optional exam that anyone can have with a 2D mammogram. It doesn't replace or take the place of a 2D mammogram. 2D mammograms remain an effective screening test for all women.  Not all insurance companies cover the cost of a 3D mammogram. Check with your insurance.            Dec 11, 2017 12:00 PM CST   (Arrive by 11:45 AM)   PRE-OP with Jayro Joradn MD   [facility] ([facility])    [address] [place] [address]  4th [facility] [id number] [phone]            Dec 26, 2017  1:00 PM CST   Adult Med Follow UP with Chaparrita Hatch MD   [facility] ([facility])    [address]  [facility] [address] [phone]            Dec 29, 2017  9:00 AM CST   LAB with  LAB   [facility] ([facility])    [address] [place] [id number] [phone]           Please do not eat 10-12 hours before your appointment if you are coming in fasting for labs on lipids, cholesterol, or glucose (sugar). This does not apply to pregnant women. Water, hot tea and black coffee (with nothing added) are okay. Do not drink other fluids, diet soda or chew gum.            Dec 29, 2017  9:30 AM CST   (Arrive by 9:15 AM)   NUTRITION VISIT with Francesca Danielle RD   [facility] ([facility])    [address] [place] [address]  4th [facility] [id number] [phone]            " Jan 08, 2018  9:30 AM CST   (Arrive by 9:15 AM)   Return Visit with Horacio Sheridan MD   [facility] ([facility])    [address] [place] [id number] [phone]            Jan 19, 2018  9:00 AM CST   LAB with  LAB   [facility] ([facility])    [address] [place] [id number] [phone]           Please do not eat 10-12 hours before your appointment if you are coming in fasting for labs on lipids, cholesterol, or glucose (sugar). This does not apply to pregnant women. Water, hot tea and black coffee (with nothing added) are okay. Do not drink other fluids, diet soda or chew gum.            Jan 19, 2018  9:30 AM CST   (Arrive by 9:15 AM)   NUTRITION VISIT with Francesca Danielle RD   [facility] ([facility])    [address] [place] [address] [phone]            Feb 16, 2018  9:00 AM CST   LAB with  LAB   [facility] ([facility])    [address] [place] [id number] [phone]           Please do not eat 10-12 hours before your appointment if you are coming in fasting for labs on lipids, cholesterol, or glucose (sugar). This does not apply to pregnant women. Water, hot tea and black coffee (with nothing added) are okay. Do not drink other fluids, diet soda or chew gum.            Feb 16, 2018  9:30 AM CST   (Arrive by 9:15 AM)   NUTRITION VISIT with Francesca Danielle RD   [facility] ([facility])    [address] [place] [id number] [phone]              Further instructions from your care team       [facility] Colonoscopy/Flexible Sigmoidoscopy with Monitored Anesthesia Care  For 24 hours after your procedure  Sedation:  1. Get plenty of rest. A responsible adult must  stay with you for at least 24 hours after you leave the hospital.   2. Do not drive or use heavy equipment. If you have weakness or tingling, don't drive or use heavy equipment until this feeling goes away.  3. Do not drink alcohol.  4. Avoid strenuous or risky activities. Ask for help when climbing stairs.   5. You may feel lightheaded. IF so, sit for a few minutes before standing. Have someone help you get up.   6. If you have nausea (feel sick to your stomach): Drink only clear liquids such as apple juice, ginger ale, broth or 7-Up. Rest may also help. Be sure to drink enough fluids. Move to a regular diet as you feel able.  7. You may have a slight fever. Call the doctor if your fever is over 100 F (37.7 C) (taken under the tongue) or lasts longer than 24 hours.  8. You may have a dry mouth, a sore throat, muscle aches or trouble sleeping. These should go away after 24 hours.  9. Do not make important or legal decisions.   Procedural:  1. You may return to your normal diet and medicines.  2. Air was placed in your colon during the exam in order to see it. Walking helps to pass the air.  3. You may take Tylenol (acetaminophen) for pain unless your doctor has told you not to.   Do not take aspirin or ibuprofen (Advil, Motrin, or other anti-inflammatory  drugs) for _3____ days.  Call your doctor for any of the following  1. Unusual pain in belly or chest pain not relieved with passing air.  2. More than 1 to 2 Tablespoons of bleeding from your rectum.  3. Signs of infection (fever, growing tenderness at the surgery site, a large amount of drainage or bleeding, severe pain, foul-smelling drainage, redness, swelling).  4. It has been over 8 to 10 hours since surgery and you are still not able to urinate (pass water).  5. Headache for over 24 hours.  6. Numbness, tingling or weakness the day after surgery (if you had spinal anesthesia).  Follow-up:  ___x_ We took small tissue samples or polyps to study. Your doctor  will call you with the results within two weeks.  If you have severe pain, bleeding, or shortness of breath, go to an emergency room.  If you have questions, call:  Endoscopy: Monday to Friday, 7 a.m. to 4:30 p.m. [phone] (We may have to call you back)  After hours: Hospital  [phone] (Ask for the GI fellow on call)      Pending Results     No orders found from 11/28/2017 to 12/1/2017.            Admission Information     Date & Time Provider Department Dept. Phone    11/30/2017 Marciano Chester MD [facility], [place], Endoscopy [phone]      Your Vitals Were     Blood Pressure Temperature Respirations Height Weight Pulse Oximetry    116/67 98.3  F (36.8  C) (Oral) 16 1.524 m (5') 74.4 kg (164 lb) 100%    BMI (Body Mass Index)                   32.03 kg/m2           [employer] Information     [employer] gives you secure access to your electronic health record. If you see a primary care provider, you can also send messages to your care team and make appointments. If you have questions, please call your primary care clinic.  If you do not have a primary care provider, please call [phone] and they will assist you.        Care EveryWhere ID     This is your Care EveryWhere ID. This could be used by other organizations to access your [facility] medical records  [id number]        Equal Access to Services     LINNEA HIDALGO AH: Hadii riky richter Somartín, waaxda luqadaha, qaybta kaalmada adeelizabeth, ty agrawal . So [facility] [phone].    ATENCIÓN: Si habla español, tiene a goetz disposición servicios gratuitos de asistencia lingüística. Llame al [phone].    We comply with applicable federal civil rights laws and [place] laws. We do not discriminate on the basis of race, color, national origin, age, disability, sex, sexual orientation, or gender identity.               Review of your medicines      UNREVIEWED medicines. Ask your doctor about these medicines        Dose /  Directions    acetylcysteine 600 MG Caps capsule   Commonly known as:  N-ACETYL CYSTEINE        Take 2 400 mg caps two times daily for total daily dose of 800 mg   Quantity:  30 capsule   Refills:  0       albuterol 108 (90 BASE) MCG/ACT Inhaler   Commonly known as:  PROAIR HFA/PROVENTIL HFA/VENTOLIN HFA   Used for:  Exercise-induced asthma        Dose:  2 puff   Inhale 2 puffs into the lungs every 6 hours as needed for shortness of breath / dyspnea or wheezing   Quantity:  1 Inhaler   Refills:  11       ARIPiprazole 2 MG tablet   Commonly known as:  ABILIFY   Used for:  Major depressive disorder, recurrent episode, moderate (H)        Dose:  1 mg   Take 0.5 tablets (1 mg) by mouth daily   Quantity:  15 tablet   Refills:  3       aspirin 81 MG EC tablet   Used for:  Kidney replaced by transplant        Dose:  81 mg   Take 1 tablet (81 mg) by mouth daily   Quantity:  30 tablet   Refills:  5       BENADRYL 25 MG capsule   Generic drug:  diphenhydrAMINE        Dose:  25 mg   Take 25 mg by mouth At Bedtime.   Refills:  0       cyanocobalamin 1000 MCG tablet   Commonly known as:  vitamin  B-12   Used for:  CKD (chronic kidney disease) stage 5, GFR less than 15 ml/min (H)        Dose:  1000 mcg   Take 1 tablet by mouth daily.   Quantity:  30 tablet   Refills:  0       cycloSPORINE modified 25 MG capsule   Commonly known as:  GENERIC EQUIVALENT   Used for:  Kidney replaced by transplant        Dose:  125 mg   Take 5 capsules (125 mg) by mouth 2 times daily   Quantity:  300 capsule   Refills:  6       econazole nitrate 1 % cream   Used for:  Type II or unspecified type diabetes mellitus with neurological manifestations, not stated as uncontrolled(250.60) (H), Dermatophytosis of foot        Apply topically daily   Quantity:  85 g   Refills:  3       furosemide 20 MG tablet   Commonly known as:  LASIX   Used for:  Generalized edema        Dose:  20 mg   Take 1 tablet (20 mg) by mouth daily   Quantity:  90 tablet   Refills:  3        latanoprost 0.005 % ophthalmic solution   Commonly known as:  XALATAN   Used for:  Mild stage glaucoma(365.71)        Dose:  1 drop   Place 1 drop into both eyes At Bedtime   Quantity:  2.5 mL   Refills:  11       metFORMIN 500 MG 24 hr tablet   Commonly known as:  GLUCOPHAGE-XR   Used for:  Type 2 diabetes mellitus with diabetic polyneuropathy, without long-term current use of insulin (H)        Dose:  1500 mg   Take 3 tablets (1,500 mg) by mouth daily (with dinner)   Quantity:  90 tablet   Refills:  11       mycophenolate 250 MG capsule   Commonly known as:  GENERIC EQUIVALENT   Used for:  Kidney transplanted        Dose:  1000 mg   Take 4 capsules (1,000 mg) by mouth 2 times daily   Quantity:  240 capsule   Refills:  11       omeprazole 40 MG capsule   Commonly known as:  priLOSEC   Used for:  Gastroesophageal reflux disease without esophagitis        Dose:  40 mg   Take 1 capsule (40 mg) by mouth daily   Quantity:  90 capsule   Refills:  3       ondansetron 4 MG ODT tab   Commonly known as:  ZOFRAN-ODT   Used for:  Chronic kidney disease, stage V (H)        Dose:  4 mg   Take 1 tablet (4 mg) by mouth every 6 hours as needed   Quantity:  20 tablet   Refills:  3       prazosin 5 MG capsule   Commonly known as:  MINIPRESS   Used for:  Nightmares associated with chronic post-traumatic stress disorder        Dose:  15 mg   Take 3 capsules (15 mg) by mouth At Bedtime   Quantity:  90 capsule   Refills:  3       REFRESH OP        Apply to eye as needed Both eyes   Refills:  0       replens Gel   Used for:  Vaginal dryness        Use vaginally as needed. Can use up to 3 times per week.   Quantity:  35 g   Refills:  11       simvastatin 20 MG tablet   Commonly known as:  ZOCOR   Used for:  Chronic kidney disease, stage V (H)        Dose:  20 mg   Take 1 tablet (20 mg) by mouth At Bedtime   Quantity:  90 tablet   Refills:  3       sulfamethoxazole-trimethoprim 400-80 MG per tablet   Commonly known as:  BACTRIM/SEPTRA    Indication:  PCP prophylaxis   Used for:  Immunosuppression (H)        Dose:  1 tablet   Take 1 tablet by mouth daily   Quantity:  90 tablet   Refills:  3       topiramate 200 MG tablet   Commonly known as:  TOPAMAX   Used for:  Morbid obesity due to excess calories (H)        Dose:  200 mg   Take 1 tablet (200 mg) by mouth 2 times daily   Quantity:  60 tablet   Refills:  5       TYLENOL 325 MG tablet   Generic drug:  acetaminophen        Dose:  325-650 mg   Take 325-650 mg by mouth as needed   Refills:  0       vilazodone 40 MG Tabs tablet   Commonly known as:  VIIBRYD   Used for:  Major depressive disorder, recurrent episode, moderate (H)        Dose:  40 mg   Take 1 tablet (40 mg) by mouth daily   Quantity:  30 tablet   Refills:  3       vitamin D 1000 UNITS capsule        Dose:  2000 Units   2,000 Units   Quantity:  30 capsule   Refills:  0         CONTINUE these medicines which have NOT CHANGED        Dose / Directions    blood glucose monitoring lancets   Used for:  Type 2 diabetes mellitus with peripheral neuropathy (H)        Use to test blood sugar 2 times daily or as directed.   Quantity:  1 Box   Refills:  11       * blood glucose monitoring meter device kit   Commonly known as:  no brand specified   Used for:  Type 2 diabetes mellitus with peripheral neuropathy (H)        Use to test blood sugar 2 times daily or as directed.   Quantity:  1 kit   Refills:  0       * blood glucose monitoring meter device kit        Refills:  0       blood glucose monitoring test strip   Commonly known as:  no brand specified   Used for:  Type 2 diabetes mellitus with peripheral neuropathy (H)        Use to test blood sugars 2 times daily or as directed   Quantity:  100 strip   Refills:  11       order for DME   Used for:  Fall, initial encounter        Walker with front wheels and a seat.   Quantity:  1 Units   Refills:  0       * Notice:  This list has 2 medication(s) that are the same as other medications prescribed  for you. Read the directions carefully, and ask your doctor or other care provider to review them with you.             Protect others around you: Learn how to safely use, store and throw away your medicines at www.disposemymeds.org.             Medication List: This is a list of all your medications and when to take them. Check marks below indicate your daily home schedule. Keep this list as a reference.      Medications           Morning Afternoon Evening Bedtime As Needed    acetylcysteine 600 MG Caps capsule   Commonly known as:  N-ACETYL CYSTEINE   Take 2 400 mg caps two times daily for total daily dose of 800 mg                                albuterol 108 (90 BASE) MCG/ACT Inhaler   Commonly known as:  PROAIR HFA/PROVENTIL HFA/VENTOLIN HFA   Inhale 2 puffs into the lungs every 6 hours as needed for shortness of breath / dyspnea or wheezing                                ARIPiprazole 2 MG tablet   Commonly known as:  ABILIFY   Take 0.5 tablets (1 mg) by mouth daily                                aspirin 81 MG EC tablet   Take 1 tablet (81 mg) by mouth daily                                BENADRYL 25 MG capsule   Take 25 mg by mouth At Bedtime.   Generic drug:  diphenhydrAMINE                                blood glucose monitoring lancets   Use to test blood sugar 2 times daily or as directed.                                * blood glucose monitoring meter device kit   Commonly known as:  no brand specified   Use to test blood sugar 2 times daily or as directed.                                * blood glucose monitoring meter device kit                                blood glucose monitoring test strip   Commonly known as:  no brand specified   Use to test blood sugars 2 times daily or as directed                                cyanocobalamin 1000 MCG tablet   Commonly known as:  vitamin  B-12   Take 1 tablet by mouth daily.                                cycloSPORINE modified 25 MG capsule   Commonly known as:   GENERIC EQUIVALENT   Take 5 capsules (125 mg) by mouth 2 times daily                                econazole nitrate 1 % cream   Apply topically daily                                furosemide 20 MG tablet   Commonly known as:  LASIX   Take 1 tablet (20 mg) by mouth daily                                latanoprost 0.005 % ophthalmic solution   Commonly known as:  XALATAN   Place 1 drop into both eyes At Bedtime                                metFORMIN 500 MG 24 hr tablet   Commonly known as:  GLUCOPHAGE-XR   Take 3 tablets (1,500 mg) by mouth daily (with dinner)                                mycophenolate 250 MG capsule   Commonly known as:  GENERIC EQUIVALENT   Take 4 capsules (1,000 mg) by mouth 2 times daily                                omeprazole 40 MG capsule   Commonly known as:  priLOSEC   Take 1 capsule (40 mg) by mouth daily                                ondansetron 4 MG ODT tab   Commonly known as:  ZOFRAN-ODT   Take 1 tablet (4 mg) by mouth every 6 hours as needed                                order for DME   Walker with front wheels and a seat.                                prazosin 5 MG capsule   Commonly known as:  MINIPRESS   Take 3 capsules (15 mg) by mouth At Bedtime                                REFRESH OP   Apply to eye as needed Both eyes                                replens Gel   Use vaginally as needed. Can use up to 3 times per week.                                simvastatin 20 MG tablet   Commonly known as:  ZOCOR   Take 1 tablet (20 mg) by mouth At Bedtime                                sulfamethoxazole-trimethoprim 400-80 MG per tablet   Commonly known as:  BACTRIM/SEPTRA   Take 1 tablet by mouth daily                                topiramate 200 MG tablet   Commonly known as:  TOPAMAX   Take 1 tablet (200 mg) by mouth 2 times daily                                TYLENOL 325 MG tablet   Take 325-650 mg by mouth as needed   Generic drug:  acetaminophen                                 vilazodone 40 MG Tabs tablet   Commonly known as:  VIIBRYD   Take 1 tablet (40 mg) by mouth daily                                vitamin D 1000 UNITS capsule   2,000 Units                                * Notice:  This list has 2 medication(s) that are the same as other medications prescribed for you. Read the directions carefully, and ask your doctor or other care provider to review them with you.

## 2017-11-30 NOTE — IP AVS SNAPSHOT
[facility], [place], Endoscopy    [address] [facility] [address] [facility] [place] [address]    Phone:  [phone]                                       After Visit Summary   11/30/2017    Elizabeth Palma    MRN: [id number]           After Visit Summary Signature Page     I have received my discharge instructions, and my questions have been answered. I have discussed any challenges I see with this plan with the nurse or doctor.    ..........................................................................................................................................  Patient/Patient Representative Signature      ..........................................................................................................................................  Patient Representative Print Name and Relationship to Patient    ..................................................               ................................................  Date                                            Time    ..........................................................................................................................................  Reviewed by Signature/Title    ...................................................              ..............................................  Date                                                            Time

## 2017-11-30 NOTE — ANESTHESIA PREPROCEDURE EVALUATION
Anesthesia Evaluation         Anesthesia Plan      History & Physical Review  History and physical reviewed and following examination; no interval change.    ASA Status:  3 .    NPO Status:  > 6 hours    Plan for MAC with Intravenous induction. Reason for MAC:  Difficulty with conscious sedation (QS)         Postoperative Care      Consents  Anesthetic plan, risks, benefits and alternatives discussed with:  Patient..          ANESTHESIA PREOP EVALUATION    NPO Status: NPO per Anesthesia Guidelines for Procedure/Surgery Except for: Meds Yes, NPO    Procedure: Procedure(s):  Colonoscopy - Wound Class: II-Clean Contaminated    HPI: Presents for colonoscopy     PMHx/PSHx/ROS:  PAST MEDICAL HISTORY:   Past Medical History:   Diagnosis Date     Abnormal MRI, cervical spine 10/15/2011    2011; mild changes noted. Study done for left arm symptoms Impression:  1. Mild multilevel degenerative disc disease with no significant canal or neural stenosis seen. motion artifact on the STIR images in these are not interpretable. The remaining images were interpreted      Autosomal dominant polycystic kidney disease 2011     (Problem list name updated by automated process. Provider to review and confirm.)     CMC DJD(carpometacarpal degenerative joint disease), localized primary 3/5/2013     -donor kidney transplant 3/20/2014     Depressive disorder 11/15/2012     DM type 2 (diabetes mellitus, type 2) (H) 2013     Encounter for long-term (current) use of other medications 2015     Family history of tremor 10/17/2011     Generalized anxiety disorder 11/15/2012     Glaucoma      Hyperlipidemia 10/15/2011     Hyperparathyroidism, secondary (H) 2015     Hypertension     resolved     Immunosuppressed status (H) 3/20/2014     Major depressive disorder, recurrent episode, moderate (H) 11/15/2012     Obesity (BMI 30-39.9)      OP (osteoporosis) T score -3.8 2009 T-score -3.7      LION (obstructive  sleep apnoea) 10/15/2012    intol to cpa     Pain in joint, forearm -- L unhealed Fx 5/21/2013     Premature menopause age 37 7/10/2012    OCP (vaginal bldg)-->HT which she stopped 2 mo later documented at Jan 12, 2007 visit (age 51).      Restless leg syndrome      Rib fractures 4/13/2013     Sensory loss 10/17/2011    Bottom of feet; uncertain if there is a neuropathy per notes.       Stiffness of joint, not elsewhere classified, hand 3/5/2013     Tremor 10/15/2011    head     Uncomplicated asthma        PAST SURGICAL HISTORY:   Past Surgical History:   Procedure Laterality Date     ABDOMEN SURGERY       ANKLE SURGERY       C TRANSPLANTATION OF KIDNEY  3/2014     C/SECTION, LOW TRANSVERSE      x 2     CHOLECYSTECTOMY  1990     COLONOSCOPY       ESOPHAGOSCOPY, GASTROSCOPY, DUODENOSCOPY (EGD), COMBINED N/A 5/19/2015    Procedure: COMBINED ESOPHAGOSCOPY, GASTROSCOPY, DUODENOSCOPY (EGD);  Surgeon: Sky Davey MD;  Location:  GI     ESOPHAGOSCOPY, GASTROSCOPY, DUODENOSCOPY (EGD), COMBINED N/A 5/19/2015    Procedure: COMBINED ESOPHAGOSCOPY, GASTROSCOPY, DUODENOSCOPY (EGD), BIOPSY SINGLE OR MULTIPLE;  Surgeon: Sky Davey MD;  Location:  GI     EYE SURGERY       LAPAROSCOPY, SURGICAL; REPAIR INCISIONAL OR VENTRAL HERNIA       ORTHOPEDIC SURGERY       HERMINIA EN Y BOWEL  1990     WRIST SURGERY         FAMILY HISTORY:   Family History   Problem Relation Age of Onset     MENTAL ILLNESS Other      family hx     HEART DISEASE Other      DIABETES Other      CANCER Other      Genetic Disorder Father      Hyperlipidemia Mother      Glaucoma No family hx of      Macular Degeneration No family hx of      Hypertension No family hx of          Past Anes Hx: No personal or family h/o anesthesia problems    Soc Hx:   Tobacco:   EtOH:     Allergies:   Allergies   Allergen Reactions     Percocet [Oxycodone-Acetaminophen] Nausea and Vomiting     Novocain [Procaine Hcl] Hives     Had reaction 25 years ago to old  renetta. Pt reports multiple injections of lidocaine since then without reaction.  Tolerated lidocaine injection today without difficulty.  Osmar Mark MD IR Service.       Meds:   Prescriptions Prior to Admission   Medication Sig Dispense Refill Last Dose     topiramate (TOPAMAX) 200 MG tablet Take 1 tablet (200 mg) by mouth 2 times daily 60 tablet 5 11/30/2017     vilazodone (VIIBRYD) 40 MG TABS tablet Take 1 tablet (40 mg) by mouth daily 30 tablet 3 11/30/2017     prazosin (MINIPRESS) 5 MG capsule Take 3 capsules (15 mg) by mouth At Bedtime 90 capsule 3 11/29/2017     ARIPiprazole (ABILIFY) 2 MG tablet Take 0.5 tablets (1 mg) by mouth daily 15 tablet 3 11/29/2017     metFORMIN (GLUCOPHAGE-XR) 500 MG 24 hr tablet Take 3 tablets (1,500 mg) by mouth daily (with dinner) 90 tablet 11 11/29/2017     cycloSPORINE modified (GENERIC EQUIVALENT) 25 MG capsule Take 5 capsules (125 mg) by mouth 2 times daily 300 capsule 6 11/30/2017     furosemide (LASIX) 20 MG tablet Take 1 tablet (20 mg) by mouth daily 90 tablet 3 11/30/2017     blood glucose monitoring (ACCU-CHEK RALPH PLUS) meter device kit   0 Past Week     omeprazole (PRILOSEC) 40 MG capsule Take 1 capsule (40 mg) by mouth daily 90 capsule 3 11/30/2017     simvastatin (ZOCOR) 20 MG tablet Take 1 tablet (20 mg) by mouth At Bedtime 90 tablet 3 11/29/2017     Polyvinyl Alcohol-Povidone (REFRESH OP) Apply to eye as needed Both eyes   Past Week     sulfamethoxazole-trimethoprim (BACTRIM/SEPTRA) 400-80 MG per tablet Take 1 tablet by mouth daily 90 tablet 3 11/30/2017     mycophenolate (CELLCEPT - GENERIC EQUIVALENT) 250 MG capsule Take 4 capsules (1,000 mg) by mouth 2 times daily 240 capsule 11 11/30/2017     acetylcysteine (N-ACETYL-L-CYSTEINE) 600 MG CAPS capsule Take 2 400 mg caps two times daily for total daily dose of 800 mg 30 capsule  Past Week     ondansetron (ZOFRAN-ODT) 4 MG disintegrating tablet Take 1 tablet (4 mg) by mouth every 6 hours as needed 20 tablet 3  11/30/2017     Cholecalciferol (VITAMIN D) 1000 UNITS capsule 2,000 Units  30 capsule  Past Week     aspirin EC 81 MG EC tablet Take 1 tablet (81 mg) by mouth daily 30 tablet 5 Past Week     cyanocolbalamin (VITAMIN  B-12) 1000 MCG tablet Take 1 tablet by mouth daily. (Patient taking differently: Take 1,000 mcg by mouth every other day ) 30 tablet 0 Past Week     diphenhydrAMINE (BENADRYL) 25 MG capsule Take 25 mg by mouth At Bedtime.   Past Week     albuterol (PROAIR HFA/PROVENTIL HFA/VENTOLIN HFA) 108 (90 BASE) MCG/ACT Inhaler Inhale 2 puffs into the lungs every 6 hours as needed for shortness of breath / dyspnea or wheezing 1 Inhaler 11 More than a month     order for DME Walker with front wheels and a seat. 1 Units 0 Taking     Vaginal Lubricant (REPLENS) GEL Use vaginally as needed. Can use up to 3 times per week. 35 g 11 Taking     latanoprost (XALATAN) 0.005 % ophthalmic solution Place 1 drop into both eyes At Bedtime 2.5 mL 11 Taking     blood glucose monitoring (NO BRAND SPECIFIED) meter device kit Use to test blood sugar 2 times daily or as directed. 1 kit 0 Taking     blood glucose monitoring (NO BRAND SPECIFIED) test strip Use to test blood sugars 2 times daily or as directed 100 strip 11 Taking     blood glucose monitoring (SOFTCLIX) lancets Use to test blood sugar 2 times daily or as directed. 1 Box 11 Taking     econazole nitrate 1 % cream Apply topically daily (Patient taking differently: Apply topically as needed ) 85 g 3 Taking     acetaminophen (TYLENOL) 325 MG tablet Take 325-650 mg by mouth as needed   Unknown       No current outpatient prescriptions on file.       Physical Exam:  VS: T 98.3, P Data Unavailable, /67, R 16, SpO2 100% Weight   Wt Readings from Last 2 Encounters:   11/30/17 74.4 kg (164 lb)   11/28/17 74.9 kg (165 lb 3.2 oz)         Airway: MP 2, TM>3FB, Neck full ROM  Dentition: no loose teeth  Heart: RRR  Lungs: CTAB      BMP:  Lab Results   Component Value Date      11/17/2017      Lab Results   Component Value Date    POTASSIUM 3.7 11/17/2017     Lab Results   Component Value Date    CHLORIDE 108 11/17/2017     Lab Results   Component Value Date    MARY 9.2 11/28/2017     Lab Results   Component Value Date    CO2 21 11/17/2017     Lab Results   Component Value Date    BUN 14 11/17/2017     Lab Results   Component Value Date    CR 0.98 11/28/2017     Lab Results   Component Value Date     11/17/2017        CBC:  Lab Results   Component Value Date    WBC 3.6 11/17/2017     Lab Results   Component Value Date    HGB 13.1 11/17/2017     Lab Results   Component Value Date    HCT 42.1 11/17/2017     Lab Results   Component Value Date     11/17/2017        Coags/Type and Screen  Lab Results   Component Value Date    INR 1.13 04/16/2014     No results found for: PT  Type and Screen:      Assessment/Plan:  - ASA 3  - MAC with standard ASA monitors, IV induction, balanced anesthetic  - PIV  - Antibiotics per surgery  - PONV prophylaxis  - Blood products available, possible administration discussed with patient    Darin Jay MD    11/30/2017  2:51 PM

## 2017-11-30 NOTE — LETTER
12/4/2017     Elizabeth Palma  [address]   [facility] [address]      Dear Elizabeth:    The pathology results returned from the polyps removed at your recent colonoscopy.    The polyp was benign and showed no evidence of cancer.      Due to the microscopic appearance of the polyp, you will require closer monitoring in the future.    Current guidelines recommend that you undergo a follow-up colonoscopy in 3 years.    Sincerely,            Marciano Del Cid MD  [facility], [place], ENDOSCOPY  [address] [facility] [address]  Phone: [phone]

## 2017-11-30 NOTE — DISCHARGE INSTRUCTIONS
[facility] Colonoscopy/Flexible Sigmoidoscopy with Monitored Anesthesia Care  For 24 hours after your procedure  Sedation:  1. Get plenty of rest. A responsible adult must stay with you for at least 24 hours after you leave the hospital.   2. Do not drive or use heavy equipment. If you have weakness or tingling, don't drive or use heavy equipment until this feeling goes away.  3. Do not drink alcohol.  4. Avoid strenuous or risky activities. Ask for help when climbing stairs.   5. You may feel lightheaded. IF so, sit for a few minutes before standing. Have someone help you get up.   6. If you have nausea (feel sick to your stomach): Drink only clear liquids such as apple juice, ginger ale, broth or 7-Up. Rest may also help. Be sure to drink enough fluids. Move to a regular diet as you feel able.  7. You may have a slight fever. Call the doctor if your fever is over 100 F (37.7 C) (taken under the tongue) or lasts longer than 24 hours.  8. You may have a dry mouth, a sore throat, muscle aches or trouble sleeping. These should go away after 24 hours.  9. Do not make important or legal decisions.   Procedural:  1. You may return to your normal diet and medicines.  2. Air was placed in your colon during the exam in order to see it. Walking helps to pass the air.  3. You may take Tylenol (acetaminophen) for pain unless your doctor has told you not to.   Do not take aspirin or ibuprofen (Advil, Motrin, or other anti-inflammatory  drugs) for _3____ days.  Call your doctor for any of the following  1. Unusual pain in belly or chest pain not relieved with passing air.  2. More than 1 to 2 Tablespoons of bleeding from your rectum.  3. Signs of infection (fever, growing tenderness at the surgery site, a large amount of drainage or bleeding, severe pain, foul-smelling drainage, redness, swelling).  4. It has been over 8 to 10 hours since surgery and you are still not able to urinate (pass water).  5. Headache for over 24  hours.  6. Numbness, tingling or weakness the day after surgery (if you had spinal anesthesia).  Follow-up:  ___x_ We took small tissue samples or polyps to study. Your doctor will call you with the results within two weeks.  If you have severe pain, bleeding, or shortness of breath, go to an emergency room.  If you have questions, call:  Endoscopy: Monday to Friday, 7 a.m. to 4:30 p.m. [phone] (We may have to call you back)  After hours: Hospital  [phone] (Ask for the GI fellow on call)

## 2017-11-30 NOTE — OR NURSING
Procedure: colonoscopy with polypectomy by hot snare  Sedation:monitored anesthesia care  Specimens: x1, sent to lab.   O2: per CRNA  Tolerated procedure: well  Pt to recovery area in stable condition accompanied by CRNA.   Other:  none    Estela Hale RN

## 2017-12-01 LAB — COPATH REPORT: NORMAL

## 2017-12-04 ASSESSMENT — ENCOUNTER SYMPTOMS
INSOMNIA: 1
NERVOUS/ANXIOUS: 1
PANIC: 1
DECREASED CONCENTRATION: 1
DEPRESSION: 1

## 2017-12-05 ENCOUNTER — RADIANT APPOINTMENT (OUTPATIENT)
Dept: MAMMOGRAPHY | Facility: CLINIC | Age: 62
End: 2017-12-05
Attending: ADVANCED PRACTICE MIDWIFE

## 2017-12-05 DIAGNOSIS — Z12.31 ENCOUNTER FOR SCREENING MAMMOGRAM FOR MALIGNANT NEOPLASM OF BREAST: ICD-10-CM

## 2017-12-05 DIAGNOSIS — Z12.39 BREAST CANCER SCREENING: ICD-10-CM

## 2017-12-05 DIAGNOSIS — Z12.31 VISIT FOR SCREENING MAMMOGRAM: ICD-10-CM

## 2017-12-06 DIAGNOSIS — D84.9 IMMUNOSUPPRESSION (H): Primary | ICD-10-CM

## 2017-12-06 RX ORDER — SULFAMETHOXAZOLE AND TRIMETHOPRIM 400; 80 MG/1; MG/1
1 TABLET ORAL DAILY
Qty: 90 TABLET | Refills: 3 | Status: SHIPPED | OUTPATIENT
Start: 2017-12-06 | End: 2018-11-21

## 2017-12-06 NOTE — TELEPHONE ENCOUNTER
Drug name: smz/tmp 400-80  Last fill: 09/19/2017  Last quantity: 90    Wendy Serna  [facility]

## 2017-12-11 ENCOUNTER — OFFICE VISIT (OUTPATIENT)
Dept: FAMILY MEDICINE | Facility: CLINIC | Age: 62
End: 2017-12-11

## 2017-12-11 VITALS
HEIGHT: 61 IN | SYSTOLIC BLOOD PRESSURE: 114 MMHG | TEMPERATURE: 98 F | RESPIRATION RATE: 16 BRPM | OXYGEN SATURATION: 98 % | WEIGHT: 164.3 LBS | BODY MASS INDEX: 31.02 KG/M2 | DIASTOLIC BLOOD PRESSURE: 77 MMHG | HEART RATE: 62 BPM

## 2017-12-11 DIAGNOSIS — Z01.818 PREOP GENERAL PHYSICAL EXAM: ICD-10-CM

## 2017-12-11 DIAGNOSIS — M25.532 ARTHRALGIA OF LEFT FOREARM: Primary | ICD-10-CM

## 2017-12-11 ASSESSMENT — PAIN SCALES - GENERAL: PAINLEVEL: SEVERE PAIN (7)

## 2017-12-11 NOTE — LETTER
2017      RE: Elizabeth Palma  [address]   [facility] [address]       Elizabeth Palma is 62 year old female here at the request of Dr. Francisco Fuentes for cardiovascular, pulmonary, and perioperative risk assessment prior to surgery.The intended surgical procedure is Left wrist scaphoidectomy and four corner fusion. A copy of this note will be sent to the surgeon. Procedure 2018  She presents with her  Rahat. They will traveling to [place] and will return the end of the month. She will have labs done with scheduled blood draw on 17.    Patient Active Problem List   Diagnosis     Autosomal dominant polycystic kidney disease     Hypertension     Hyperlipidemia     Abnormal MRI, cervical spine     Sensory loss     Premature menopause age 37     OP (osteoporosis) T score -3.8     LION on CPAP     Major depressive disorder, recurrent episode, moderate (H)     Generalized anxiety disorder     CMC DJD(carpometacarpal degenerative joint disease), localized primary     Pain in joint, forearm -- L unhealed Fx     -donor kidney transplant     Immunosuppressed status (H)     Hyperparathyroidism, secondary (H)     Hyperparathyroidism (H)     Senile osteoporosis     Pain in joint involving ankle and foot     Nightmares associated with chronic post-traumatic stress disorder     Type 2 diabetes mellitus with peripheral neuropathy (H)     Posttraumatic stress disorder     Right knee pain     Left knee pain     Age-related osteoporosis without current pathological fracture     Past Medical History:   Diagnosis Date     Abnormal MRI, cervical spine 10/15/2011    2011; mild changes noted. Study done for left arm symptoms Impression:  1. Mild multilevel degenerative disc disease with no significant canal or neural stenosis seen. motion artifact on the STIR images in these are not interpretable. The remaining images were interpreted      Autosomal dominant polycystic kidney disease 2011      (Problem list name updated by automated process. Provider to review and confirm.)     CMC DJD(carpometacarpal degenerative joint disease), localized primary 3/5/2013     -donor kidney transplant 3/20/2014     Depressive disorder 11/15/2012     DM type 2 (diabetes mellitus, type 2) (H) 2013     Encounter for long-term (current) use of other medications 2015     Family history of tremor 10/17/2011     Generalized anxiety disorder 11/15/2012     Glaucoma      Hyperlipidemia 10/15/2011     Hyperparathyroidism, secondary (H) 2015     Hypertension     resolved     Immunosuppressed status (H) 3/20/2014     Major depressive disorder, recurrent episode, moderate (H) 11/15/2012     Obesity (BMI 30-39.9)      OP (osteoporosis) T score -3.8 2009 T-score -3.7      LION (obstructive sleep apnoea) 10/15/2012    intol to cpa     Pain in joint, forearm -- L unhealed Fx 2013     Premature menopause age 37 7/10/2012    OCP (vaginal bldg)-->HT which she stopped 2 mo later documented at 2007 visit (age 51).      Restless leg syndrome      Rib fractures 2013     Sensory loss 10/17/2011    Bottom of feet; uncertain if there is a neuropathy per notes.       Stiffness of joint, not elsewhere classified, hand 3/5/2013     Tremor 10/15/2011    head     Uncomplicated asthma       Past Surgical History:   Procedure Laterality Date     ABDOMEN SURGERY       ANKLE SURGERY       C TRANSPLANTATION OF KIDNEY  3/2014     C/SECTION, LOW TRANSVERSE      x 2     CHOLECYSTECTOMY       COLONOSCOPY       ESOPHAGOSCOPY, GASTROSCOPY, DUODENOSCOPY (EGD), COMBINED N/A 2015    Procedure: COMBINED ESOPHAGOSCOPY, GASTROSCOPY, DUODENOSCOPY (EGD);  Surgeon: Sky Davey MD;  Location:  GI     ESOPHAGOSCOPY, GASTROSCOPY, DUODENOSCOPY (EGD), COMBINED N/A 2015    Procedure: COMBINED ESOPHAGOSCOPY, GASTROSCOPY, DUODENOSCOPY (EGD), BIOPSY SINGLE OR MULTIPLE;  Surgeon: Sky Davey  MD Pierre;  Location:  GI     EYE SURGERY       LAPAROSCOPY, SURGICAL; REPAIR INCISIONAL OR VENTRAL HERNIA       ORTHOPEDIC SURGERY       HERMINIA EN Y BOWEL  1990     WRIST SURGERY       Current Outpatient Prescriptions   Medication     sulfamethoxazole-trimethoprim (BACTRIM/SEPTRA) 400-80 MG per tablet     topiramate (TOPAMAX) 200 MG tablet     vilazodone (VIIBRYD) 40 MG TABS tablet     prazosin (MINIPRESS) 5 MG capsule     ARIPiprazole (ABILIFY) 2 MG tablet     metFORMIN (GLUCOPHAGE-XR) 500 MG 24 hr tablet     cycloSPORINE modified (GENERIC EQUIVALENT) 25 MG capsule     albuterol (PROAIR HFA/PROVENTIL HFA/VENTOLIN HFA) 108 (90 BASE) MCG/ACT Inhaler     order for DME     Vaginal Lubricant (REPLENS) GEL     furosemide (LASIX) 20 MG tablet     blood glucose monitoring (ACCU-CHEK RALPH PLUS) meter device kit     omeprazole (PRILOSEC) 40 MG capsule     simvastatin (ZOCOR) 20 MG tablet     Polyvinyl Alcohol-Povidone (REFRESH OP)     latanoprost (XALATAN) 0.005 % ophthalmic solution     blood glucose monitoring (NO BRAND SPECIFIED) meter device kit     blood glucose monitoring (NO BRAND SPECIFIED) test strip     blood glucose monitoring (SOFTCLIX) lancets     mycophenolate (CELLCEPT - GENERIC EQUIVALENT) 250 MG capsule     acetylcysteine (N-ACETYL-L-CYSTEINE) 600 MG CAPS capsule     ondansetron (ZOFRAN-ODT) 4 MG disintegrating tablet     Cholecalciferol (VITAMIN D) 1000 UNITS capsule     aspirin EC 81 MG EC tablet     acetaminophen (TYLENOL) 325 MG tablet     cyanocolbalamin (VITAMIN  B-12) 1000 MCG tablet     diphenhydrAMINE (BENADRYL) 25 MG capsule     econazole nitrate 1 % cream     No current facility-administered medications for this visit.      Immunization History   Administered Date(s) Administered     HepB 02/04/2010, 03/17/2010, 08/09/2010     Influenza (H1N1) 11/24/2009     Influenza (IIV3) PF 09/01/2008, 10/26/2011, 09/15/2012, 10/01/2013, 10/01/2014, 10/01/2015     Influenza Vaccine IM 3yrs+ 4 Valent IIV4  2016     Influenza Vaccine, 3 YRS +, IM (QUADRIVALENT W/PRESERVATIVES) 2017     Mantoux 02/15/2010     Pneumococcal 23 valent 2008     TDAP Vaccine (Boostrix) 10/12/2012     Tetanus 2005     Social History     Social History     Marital status:      Spouse name: Rahat     Number of children: 2     Years of education: N/A     Occupational History           part-time     Social History Main Topics     Smoking status: Former Smoker     Smokeless tobacco: Never Used     Alcohol use No     Drug use: No     Sexual activity: Yes     Partners: Male     Birth control/ protection: None      Comment: 1 partner     Other Topics Concern     Not on file     Social History Narrative       This is a LOW risk surgery.      HPI:   Reason for surgery: Left wrist repair previous fracture not healed. Previous carpal tunnel surgery.  Pain in left wrist with severe degenerative changes since July after playing golf noted pain.    Cardiovascular Risk:  This patient ambulates without assist without chest pain. She IS able to climb a flight of stairs without chest pain.  The patient does not have chest pain at Rest.  She does not have a history of known cardiac disease.   The patient does not have a history of stroke, and does not have a history of valvular disease.    Pulmonary Risk:  In terms of risk factors for pulmonary complications, the patient does not have a history of lung problems. She has exercise induced asthma stable.    Perioperative Complications:  The patient does not have a history of bleeding or clotting problems in the past. The patient has not had complications from past surgeries.  The patient does not have a family history of any anesthesia or surgical complications. One sister  after surgical complications.      Answers for HPI/ROS submitted by the patient on 2017   General Symptoms: No  Skin Symptoms: No  HENT Symptoms: No  EYE SYMPTOMS: No  HEART SYMPTOMS: No  LUNG SYMPTOMS:  No  INTESTINAL SYMPTOMS: No  URINARY SYMPTOMS: No she had renal transplant on Cyclosporin  GYNECOLOGIC SYMPTOMS: No  BREAST SYMPTOMS: No  SKELETAL SYMPTOMS: No  BLOOD SYMPTOMS: No  NERVOUS SYSTEM SYMPTOMS: No  MENTAL HEALTH SYMPTOMS: Yes PTSD takes  Prazosin 5 mg three at bedtime for nightmares  Nervous or Anxious: Yes stable mental health  Depression: Yes stable mental health  Trouble sleeping: Yes stable mental health  Trouble thinking or concentrating: Yes  Mood changes: Yes stable mental health  Panic attacks: Yes stable mental health      ROS:  Constitutional: no fevers, night sweats or unintentional weight change   Eyes:She wears glasses no recent vision change, diplopia or red eyes   Ears, Nose, Mouth, Throat: no tinnitus or hearing change, no epistaxis or nasal discharge, no oral lesions, throat clear   Cardiovascular: no chest pain, palpitations, or pain with walking, no orthopnea or PND   Respiratory: no dyspnea, cough, shortness of breath or wheezing   GI: previous gastric bypass, no recent nausea, vomiting, diarrhea or constipation, no abdominal pain   : previous renal transplant no change in urine, no dysuria or hematuria  Musculoskeletal: bilateral wrist pains, osteoporosis   Integumentary: no concerning lesions or moles   Neuro: no loss of strength or sensation, no numbness or tingling, no tremor, no dizziness, no headache   Endo: no polyuria or polydipsia, no temperature intolerance   Heme/Lymph: no concerning bumps, no bleeding problems   Allergy: no environmental allergies Allergies to medication noted  Psych: she has PTSD, depression, anxiety stable on current medications      Allergies   Allergen Reactions     Percocet [Oxycodone-Acetaminophen] Nausea and Vomiting     Novocain [Procaine Hcl] Hives     Had reaction 25 years ago to old renetta. Pt reports multiple injections of lidocaine since then without reaction.  Tolerated lidocaine injection today without difficulty.  Osmar Mark MD IR  "Service.       PHYSICAL EXAM:  /77 (BP Location: Right arm, Patient Position: Chair, Cuff Size: Adult Regular)  Pulse 62  Temp 98  F (36.7  C) (Oral)  Resp 16  Ht 1.549 m (5' 1\")  Wt 74.5 kg (164 lb 4.8 oz)  SpO2 98%  Breastfeeding? No  BMI 31.04 kg/m2    Wt Readings from Last 1 Encounters:   17 74.5 kg (164 lb 4.8 oz)       Constitutional: no distress, comfortable, pleasant, overweight  Eyes: anicteric, normal extra-ocular movements   Ears, Nose and Throat: tympanic membranes clear, nose clear and free of lesions, throat clear, neck supple with full range of motion, no thyromegaly.   Cardiovascular: regular rate and rhythm, normal S1 and S2, no murmurs, rubs or gallops, peripheral pulses full and symmetric  Radial aspect  Respiratory: clear to auscultation, no wheezes or crackles, normal breath sounds   Gastrointestinal: positive bowel sounds, nontender, no hepatosplenomegaly, no masses, renal transplant in right lower abdomen   Musculoskeletal: left wrist in splint, right with well healed scars arthritis changes   Skin: no concerning lesions, no jaundice   Neurological: cranial nerves intact, good strength,  normal gait, no tremor   Psychological: appropriate mood interative  Lymphatic: no cervical adenopathy      A/P:    The patient with   Patient Active Problem List   Diagnosis     Autosomal dominant polycystic kidney disease     Hypertension     Hyperlipidemia     Abnormal MRI, cervical spine     Sensory loss     Premature menopause age 37     OP (osteoporosis) T score -3.8     LION on CPAP     Major depressive disorder, recurrent episode, moderate (H)     Generalized anxiety disorder     CMC DJD(carpometacarpal degenerative joint disease), localized primary     Pain in joint, forearm -- L unhealed Fx     -donor kidney transplant     Immunosuppressed status (H)     Hyperparathyroidism, secondary (H)     Hyperparathyroidism (H)     Senile osteoporosis     Pain in joint involving ankle " and foot     Nightmares associated with chronic post-traumatic stress disorder     Type 2 diabetes mellitus with peripheral neuropathy (H)     Posttraumatic stress disorder     Right knee pain     Left knee pain     Age-related osteoporosis without current pathological fracture     Past Medical History:   Diagnosis Date     Abnormal MRI, cervical spine 10/15/2011    2011; mild changes noted. Study done for left arm symptoms Impression:  1. Mild multilevel degenerative disc disease with no significant canal or neural stenosis seen. motion artifact on the STIR images in these are not interpretable. The remaining images were interpreted      Autosomal dominant polycystic kidney disease 2011     (Problem list name updated by automated process. Provider to review and confirm.)     CMC DJD(carpometacarpal degenerative joint disease), localized primary 3/5/2013     -donor kidney transplant 3/20/2014     Depressive disorder 11/15/2012     DM type 2 (diabetes mellitus, type 2) (H) 2013     Encounter for long-term (current) use of other medications 2015     Family history of tremor 10/17/2011     Generalized anxiety disorder 11/15/2012     Glaucoma      Hyperlipidemia 10/15/2011     Hyperparathyroidism, secondary (H) 2015     Hypertension     resolved     Immunosuppressed status (H) 3/20/2014     Major depressive disorder, recurrent episode, moderate (H) 11/15/2012     Obesity (BMI 30-39.9)      OP (osteoporosis) T score -3.8 2009 T-score -3.7      LION (obstructive sleep apnoea) 10/15/2012    intol to cpa     Pain in joint, forearm -- L unhealed Fx 2013     Premature menopause age 37 7/10/2012    OCP (vaginal bldg)-->HT which she stopped 2 mo later documented at 2007 visit (age 51).      Restless leg syndrome      Rib fractures 2013     Sensory loss 10/17/2011    Bottom of feet; uncertain if there is a neuropathy per notes.       Stiffness of joint, not elsewhere  classified, hand 3/5/2013     Tremor 10/15/2011    head     Uncomplicated asthma     presents prior to surgery for assessment of perioperative risk. The patient is at LOW risk for cardiovascular complications and at LOW risk for pulmonary complications of this LOW risk surgery.    --Approval given to proceed with proposed procedure, with further diagnostic evaluation She will have labs done with scheduled blood draw on 12/29/17.    --Patient is currently taking    Anticoagulant or Antiplatelet Medication Use  ASPIRIN: Discontinue ASA 3 days prior to procedure to reduce bleeding risk.  It should be resumed post-operatively.          ASA class 3 - Severe systemic disease, but not incapacitating  No evidence of functionally compromising cardiac or pulmonary status  The patient is recommended to hold aspirin for 3 days prior to surgery.  The patient is instructed as to which medications to take with sips of water the morning of surgery    Proceed with surgery as planned.  No food or liquids the morning of surgery.  Call surgeon if develops respiratory illness, fever, or other illness.      Laboratory studies:    Pregnancy testing was not indicated.    Cardiovascular: EKG was not indicated based on risk assessment.    Elizabeth was seen today for pre-op exam.    Diagnoses and all orders for this visit:    Arthralgia of left forearm  -     CBC with platelets differential; Future  -     Basic metabolic panel; Future    Preop general physical exam  -     CBC with platelets differential; Future  -     Basic metabolic panel; Future      Please contact our office if there are any further questions or information required about this patient.    Jayro Jordan MD    Surgeon (please enter first and last name):  Dr. Francisco Fuentes  Fax number for Preop Evaluation: [phone]  Location of Surgery:  [facility] in [place]  Date of Surgery:  01/05/18  Procedure:   Left Wrist Carpal Tunnel Surgery  History of reaction to  anesthesia?  No    Bertin Doherty CMA ([facility]) at 12:29 PM on 12/11/2017       Jayro Jordan MD

## 2017-12-11 NOTE — PROGRESS NOTES
Elizabeth Palma is 62 year old female here at the request of Dr. Francisco Fuentes for cardiovascular, pulmonary, and perioperative risk assessment prior to surgery.The intended surgical procedure is Left wrist scaphoidectomy and four corner fusion. A copy of this note will be sent to the surgeon. Procedure 2018  She presents with her  Rahat. They will traveling to [place] and will return the end of the month. She will have labs done with scheduled blood draw on 17.    Patient Active Problem List   Diagnosis     Autosomal dominant polycystic kidney disease     Hypertension     Hyperlipidemia     Abnormal MRI, cervical spine     Sensory loss     Premature menopause age 37     OP (osteoporosis) T score -3.8     LION on CPAP     Major depressive disorder, recurrent episode, moderate (H)     Generalized anxiety disorder     CMC DJD(carpometacarpal degenerative joint disease), localized primary     Pain in joint, forearm -- L unhealed Fx     -donor kidney transplant     Immunosuppressed status (H)     Hyperparathyroidism, secondary (H)     Hyperparathyroidism (H)     Senile osteoporosis     Pain in joint involving ankle and foot     Nightmares associated with chronic post-traumatic stress disorder     Type 2 diabetes mellitus with peripheral neuropathy (H)     Posttraumatic stress disorder     Right knee pain     Left knee pain     Age-related osteoporosis without current pathological fracture     Past Medical History:   Diagnosis Date     Abnormal MRI, cervical spine 10/15/2011    2011; mild changes noted. Study done for left arm symptoms Impression:  1. Mild multilevel degenerative disc disease with no significant canal or neural stenosis seen. motion artifact on the STIR images in these are not interpretable. The remaining images were interpreted      Autosomal dominant polycystic kidney disease 2011     (Problem list name updated by automated process. Provider to review and confirm.)     [facility] [place]  DJD(carpometacarpal degenerative joint disease), localized primary 3/5/2013     -donor kidney transplant 3/20/2014     Depressive disorder 11/15/2012     DM type 2 (diabetes mellitus, type 2) (H) 2013     Encounter for long-term (current) use of other medications 2015     Family history of tremor 10/17/2011     Generalized anxiety disorder 11/15/2012     Glaucoma      Hyperlipidemia 10/15/2011     Hyperparathyroidism, secondary (H) 2015     Hypertension     resolved     Immunosuppressed status (H) 3/20/2014     Major depressive disorder, recurrent episode, moderate (H) 11/15/2012     Obesity (BMI 30-39.9)      OP (osteoporosis) T score -3.8 2009 T-score -3.7      LION (obstructive sleep apnoea) 10/15/2012    intol to cpa     Pain in joint, forearm -- L unhealed Fx 2013     Premature menopause age 37 7/10/2012    OCP (vaginal bldg)-->HT which she stopped 2 mo later documented at 2007 visit (age 51).      Restless leg syndrome      Rib fractures 2013     Sensory loss 10/17/2011    Bottom of feet; uncertain if there is a neuropathy per notes.       Stiffness of joint, not elsewhere classified, hand 3/5/2013     Tremor 10/15/2011    head     Uncomplicated asthma       Past Surgical History:   Procedure Laterality Date     ABDOMEN SURGERY       ANKLE SURGERY       C TRANSPLANTATION OF KIDNEY  3/2014     C/SECTION, LOW TRANSVERSE      x 2     CHOLECYSTECTOMY       COLONOSCOPY       ESOPHAGOSCOPY, GASTROSCOPY, DUODENOSCOPY (EGD), COMBINED N/A 2015    Procedure: COMBINED ESOPHAGOSCOPY, GASTROSCOPY, DUODENOSCOPY (EGD);  Surgeon: Sky Davey MD;  Location:  GI     ESOPHAGOSCOPY, GASTROSCOPY, DUODENOSCOPY (EGD), COMBINED N/A 2015    Procedure: COMBINED ESOPHAGOSCOPY, GASTROSCOPY, DUODENOSCOPY (EGD), BIOPSY SINGLE OR MULTIPLE;  Surgeon: Sky Davey MD;  Location:  GI     EYE SURGERY       LAPAROSCOPY, SURGICAL; REPAIR INCISIONAL OR  VENTRAL HERNIA       ORTHOPEDIC SURGERY       HERMINIA EN Y BOWEL  1990     WRIST SURGERY       Current Outpatient Prescriptions   Medication     sulfamethoxazole-trimethoprim (BACTRIM/SEPTRA) 400-80 MG per tablet     topiramate (TOPAMAX) 200 MG tablet     vilazodone (VIIBRYD) 40 MG TABS tablet     prazosin (MINIPRESS) 5 MG capsule     ARIPiprazole (ABILIFY) 2 MG tablet     metFORMIN (GLUCOPHAGE-XR) 500 MG 24 hr tablet     cycloSPORINE modified (GENERIC EQUIVALENT) 25 MG capsule     albuterol (PROAIR HFA/PROVENTIL HFA/VENTOLIN HFA) 108 (90 BASE) MCG/ACT Inhaler     order for DME     Vaginal Lubricant (REPLENS) GEL     furosemide (LASIX) 20 MG tablet     blood glucose monitoring (ACCU-CHEK RALPH PLUS) meter device kit     omeprazole (PRILOSEC) 40 MG capsule     simvastatin (ZOCOR) 20 MG tablet     Polyvinyl Alcohol-Povidone (REFRESH OP)     latanoprost (XALATAN) 0.005 % ophthalmic solution     blood glucose monitoring (NO BRAND SPECIFIED) meter device kit     blood glucose monitoring (NO BRAND SPECIFIED) test strip     blood glucose monitoring (SOFTCLIX) lancets     mycophenolate (CELLCEPT - GENERIC EQUIVALENT) 250 MG capsule     acetylcysteine (N-ACETYL-L-CYSTEINE) 600 MG CAPS capsule     ondansetron (ZOFRAN-ODT) 4 MG disintegrating tablet     Cholecalciferol (VITAMIN D) 1000 UNITS capsule     aspirin EC 81 MG EC tablet     acetaminophen (TYLENOL) 325 MG tablet     cyanocolbalamin (VITAMIN  B-12) 1000 MCG tablet     diphenhydrAMINE (BENADRYL) 25 MG capsule     econazole nitrate 1 % cream     No current facility-administered medications for this visit.      Immunization History   Administered Date(s) Administered     HepB 02/04/2010, 03/17/2010, 08/09/2010     Influenza (H1N1) 11/24/2009     Influenza (IIV3) PF 09/01/2008, 10/26/2011, 09/15/2012, 10/01/2013, 10/01/2014, 10/01/2015     Influenza Vaccine IM 3yrs+ 4 Valent IIV4 09/25/2016     Influenza Vaccine, 3 YRS +, IM (QUADRIVALENT W/PRESERVATIVES) 09/29/2017      Mantoux 02/15/2010     Pneumococcal 23 valent 2008     TDAP Vaccine (Boostrix) 10/12/2012     Tetanus 2005     Social History     Social History     Marital status:      Spouse name: Rahat     Number of children: 2     Years of education: N/A     Occupational History           part-time     Social History Main Topics     Smoking status: Former Smoker     Smokeless tobacco: Never Used     Alcohol use No     Drug use: No     Sexual activity: Yes     Partners: Male     Birth control/ protection: None      Comment: 1 partner     Other Topics Concern     Not on file     Social History Narrative       This is a LOW risk surgery.      HPI:   Reason for surgery: Left wrist repair previous fracture not healed. Previous carpal tunnel surgery.  Pain in left wrist with severe degenerative changes since July after playing golf noted pain.    Cardiovascular Risk:  This patient ambulates without assist without chest pain. She IS able to climb a flight of stairs without chest pain.  The patient does not have chest pain at Rest.  She does not have a history of known cardiac disease.   The patient does not have a history of stroke, and does not have a history of valvular disease.    Pulmonary Risk:  In terms of risk factors for pulmonary complications, the patient does not have a history of lung problems. She has exercise induced asthma stable.    Perioperative Complications:  The patient does not have a history of bleeding or clotting problems in the past. The patient has not had complications from past surgeries.  The patient does not have a family history of any anesthesia or surgical complications. One sister  after surgical complications.      Answers for HPI/ROS submitted by the patient on 2017   General Symptoms: No  Skin Symptoms: No  HENT Symptoms: No  EYE SYMPTOMS: No  HEART SYMPTOMS: No  LUNG SYMPTOMS: No  INTESTINAL SYMPTOMS: No  URINARY SYMPTOMS: No she had renal transplant on  Cyclosporin  GYNECOLOGIC SYMPTOMS: No  BREAST SYMPTOMS: No  SKELETAL SYMPTOMS: No  BLOOD SYMPTOMS: No  NERVOUS SYSTEM SYMPTOMS: No  MENTAL HEALTH SYMPTOMS: Yes PTSD takes  Prazosin 5 mg three at bedtime for nightmares  Nervous or Anxious: Yes stable mental health  Depression: Yes stable mental health  Trouble sleeping: Yes stable mental health  Trouble thinking or concentrating: Yes  Mood changes: Yes stable mental health  Panic attacks: Yes stable mental health      ROS:  Constitutional: no fevers, night sweats or unintentional weight change   Eyes:She wears glasses no recent vision change, diplopia or red eyes   Ears, Nose, Mouth, Throat: no tinnitus or hearing change, no epistaxis or nasal discharge, no oral lesions, throat clear   Cardiovascular: no chest pain, palpitations, or pain with walking, no orthopnea or PND   Respiratory: no dyspnea, cough, shortness of breath or wheezing   GI: previous gastric bypass, no recent nausea, vomiting, diarrhea or constipation, no abdominal pain   : previous renal transplant no change in urine, no dysuria or hematuria  Musculoskeletal: bilateral wrist pains, osteoporosis   Integumentary: no concerning lesions or moles   Neuro: no loss of strength or sensation, no numbness or tingling, no tremor, no dizziness, no headache   Endo: no polyuria or polydipsia, no temperature intolerance   Heme/Lymph: no concerning bumps, no bleeding problems   Allergy: no environmental allergies Allergies to medication noted  Psych: she has PTSD, depression, anxiety stable on current medications      Allergies   Allergen Reactions     Percocet [Oxycodone-Acetaminophen] Nausea and Vomiting     Novocain [Procaine Hcl] Hives     Had reaction 25 years ago to old renetta. Pt reports multiple injections of lidocaine since then without reaction.  Tolerated lidocaine injection today without difficulty.  Osmar Mark MD IR Service.       PHYSICAL EXAM:  /77 (BP Location: Right arm, Patient  "Position: Chair, Cuff Size: Adult Regular)  Pulse 62  Temp 98  F (36.7  C) (Oral)  Resp 16  Ht 1.549 m (5' 1\")  Wt 74.5 kg (164 lb 4.8 oz)  SpO2 98%  Breastfeeding? No  BMI 31.04 kg/m2    Wt Readings from Last 1 Encounters:   17 74.5 kg (164 lb 4.8 oz)       Constitutional: no distress, comfortable, pleasant, overweight  Eyes: anicteric, normal extra-ocular movements   Ears, Nose and Throat: tympanic membranes clear, nose clear and free of lesions, throat clear, neck supple with full range of motion, no thyromegaly.   Cardiovascular: regular rate and rhythm, normal S1 and S2, no murmurs, rubs or gallops, peripheral pulses full and symmetric  Radial aspect  Respiratory: clear to auscultation, no wheezes or crackles, normal breath sounds   Gastrointestinal: positive bowel sounds, nontender, no hepatosplenomegaly, no masses, renal transplant in right lower abdomen   Musculoskeletal: left wrist in splint, right with well healed scars arthritis changes   Skin: no concerning lesions, no jaundice   Neurological: cranial nerves intact, good strength,  normal gait, no tremor   Psychological: appropriate mood interative  Lymphatic: no cervical adenopathy      A/P:    The patient with   Patient Active Problem List   Diagnosis     Autosomal dominant polycystic kidney disease     Hypertension     Hyperlipidemia     Abnormal MRI, cervical spine     Sensory loss     Premature menopause age 37     OP (osteoporosis) T score -3.8     LION on CPAP     Major depressive disorder, recurrent episode, moderate (H)     Generalized anxiety disorder     CMC DJD(carpometacarpal degenerative joint disease), localized primary     Pain in joint, forearm -- L unhealed Fx     -donor kidney transplant     Immunosuppressed status (H)     Hyperparathyroidism, secondary (H)     Hyperparathyroidism (H)     Senile osteoporosis     Pain in joint involving ankle and foot     Nightmares associated with chronic post-traumatic stress " disorder     Type 2 diabetes mellitus with peripheral neuropathy (H)     Posttraumatic stress disorder     Right knee pain     Left knee pain     Age-related osteoporosis without current pathological fracture     Past Medical History:   Diagnosis Date     Abnormal MRI, cervical spine 10/15/2011    2011; mild changes noted. Study done for left arm symptoms Impression:  1. Mild multilevel degenerative disc disease with no significant canal or neural stenosis seen. motion artifact on the STIR images in these are not interpretable. The remaining images were interpreted      Autosomal dominant polycystic kidney disease 2011     (Problem list name updated by automated process. Provider to review and confirm.)     CMC DJD(carpometacarpal degenerative joint disease), localized primary 3/5/2013     -donor kidney transplant 3/20/2014     Depressive disorder 11/15/2012     DM type 2 (diabetes mellitus, type 2) (H) 2013     Encounter for long-term (current) use of other medications 2015     Family history of tremor 10/17/2011     Generalized anxiety disorder 11/15/2012     Glaucoma      Hyperlipidemia 10/15/2011     Hyperparathyroidism, secondary (H) 2015     Hypertension     resolved     Immunosuppressed status (H) 3/20/2014     Major depressive disorder, recurrent episode, moderate (H) 11/15/2012     Obesity (BMI 30-39.9)      OP (osteoporosis) T score -3.8 2009 T-score -3.7      LION (obstructive sleep apnoea) 10/15/2012    intol to cpa     Pain in joint, forearm -- L unhealed Fx 2013     Premature menopause age 37 7/10/2012    OCP (vaginal bldg)-->HT which she stopped 2 mo later documented at 2007 visit (age 51).      Restless leg syndrome      Rib fractures 2013     Sensory loss 10/17/2011    Bottom of feet; uncertain if there is a neuropathy per notes.       Stiffness of joint, not elsewhere classified, hand 3/5/2013     Tremor 10/15/2011    head      Uncomplicated asthma     presents prior to surgery for assessment of perioperative risk. The patient is at LOW risk for cardiovascular complications and at LOW risk for pulmonary complications of this LOW risk surgery.    --Approval given to proceed with proposed procedure, with further diagnostic evaluation She will have labs done with scheduled blood draw on 12/29/17.    --Patient is currently taking    Anticoagulant or Antiplatelet Medication Use  ASPIRIN: Discontinue ASA 3 days prior to procedure to reduce bleeding risk.  It should be resumed post-operatively.          ASA class 3 - Severe systemic disease, but not incapacitating  No evidence of functionally compromising cardiac or pulmonary status  The patient is recommended to hold aspirin for 3 days prior to surgery.  The patient is instructed as to which medications to take with sips of water the morning of surgery    Proceed with surgery as planned.  No food or liquids the morning of surgery.  Call surgeon if develops respiratory illness, fever, or other illness.      Laboratory studies:    Pregnancy testing was not indicated.    Cardiovascular: EKG was not indicated based on risk assessment.    Elizabeth was seen today for pre-op exam.    Diagnoses and all orders for this visit:    Arthralgia of left forearm  -     CBC with platelets differential; Future  -     Basic metabolic panel; Future    Preop general physical exam  -     CBC with platelets differential; Future  -     Basic metabolic panel; Future      Please contact our office if there are any further questions or information required about this patient.    Jayro Jordan MD

## 2017-12-11 NOTE — PATIENT INSTRUCTIONS
[facility] Medication Refill Request Information:  * Please contact your pharmacy regarding ANY request for medication refills.  ** [facility] Fax = [phone]  * Please allow 3 business days for routine medication refills.  * Please allow 5 business days for controlled substance medication refills.     [facility] Test Result notification information:  *You will be notified with in 7-10 days of your appointment day regarding the results of your test.  If you are on MyChart you will be notified as soon as the provider has reviewed the results and signed off on them.    [facility] [phone]     Your Pre-Op Exam will be faxed to [facility] in [place] at [phone].

## 2017-12-11 NOTE — LETTER
2017      RE: Elizabeth Palma  [address]   [facility] [id number]       Elizabeth Palma is 62 year old female here at the request of Dr. Francisco Fuentes for cardiovascular, pulmonary, and perioperative risk assessment prior to surgery.The intended surgical procedure is Left wrist scaphoidectomy and four corner fusion. A copy of this note will be sent to the surgeon. Procedure 2018  She presents with her  Rahat. They will traveling to [place] and will return the end of the month. She will have labs done with scheduled blood draw on 17.    Patient Active Problem List   Diagnosis     Autosomal dominant polycystic kidney disease     Hypertension     Hyperlipidemia     Abnormal MRI, cervical spine     Sensory loss     Premature menopause age 37     OP (osteoporosis) T score -3.8     LION on CPAP     Major depressive disorder, recurrent episode, moderate (H)     Generalized anxiety disorder     CMC DJD(carpometacarpal degenerative joint disease), localized primary     Pain in joint, forearm -- L unhealed Fx     -donor kidney transplant     Immunosuppressed status (H)     Hyperparathyroidism, secondary (H)     Hyperparathyroidism (H)     Senile osteoporosis     Pain in joint involving ankle and foot     Nightmares associated with chronic post-traumatic stress disorder     Type 2 diabetes mellitus with peripheral neuropathy (H)     Posttraumatic stress disorder     Right knee pain     Left knee pain     Age-related osteoporosis without current pathological fracture     Past Medical History:   Diagnosis Date     Abnormal MRI, cervical spine 10/15/2011    2011; mild changes noted. Study done for left arm symptoms Impression:  1. Mild multilevel degenerative disc disease with no significant canal or neural stenosis seen. motion artifact on the STIR images in these are not interpretable. The remaining images were interpreted      Autosomal dominant polycystic kidney disease 2011      (Problem list name updated by automated process. Provider to review and confirm.)     CMC DJD(carpometacarpal degenerative joint disease), localized primary 3/5/2013     -donor kidney transplant 3/20/2014     Depressive disorder 11/15/2012     DM type 2 (diabetes mellitus, type 2) (H) 2013     Encounter for long-term (current) use of other medications 2015     Family history of tremor 10/17/2011     Generalized anxiety disorder 11/15/2012     Glaucoma      Hyperlipidemia 10/15/2011     Hyperparathyroidism, secondary (H) 2015     Hypertension     resolved     Immunosuppressed status (H) 3/20/2014     Major depressive disorder, recurrent episode, moderate (H) 11/15/2012     Obesity (BMI 30-39.9)      OP (osteoporosis) T score -3.8 2009 T-score -3.7      LION (obstructive sleep apnoea) 10/15/2012    intol to cpa     Pain in joint, forearm -- L unhealed Fx 2013     Premature menopause age 37 7/10/2012    OCP (vaginal bldg)-->HT which she stopped 2 mo later documented at 2007 visit (age 51).      Restless leg syndrome      Rib fractures 2013     Sensory loss 10/17/2011    Bottom of feet; uncertain if there is a neuropathy per notes.       Stiffness of joint, not elsewhere classified, hand 3/5/2013     Tremor 10/15/2011    head     Uncomplicated asthma       Past Surgical History:   Procedure Laterality Date     ABDOMEN SURGERY       ANKLE SURGERY       C TRANSPLANTATION OF KIDNEY  3/2014     C/SECTION, LOW TRANSVERSE      x 2     CHOLECYSTECTOMY       COLONOSCOPY       ESOPHAGOSCOPY, GASTROSCOPY, DUODENOSCOPY (EGD), COMBINED N/A 2015    Procedure: COMBINED ESOPHAGOSCOPY, GASTROSCOPY, DUODENOSCOPY (EGD);  Surgeon: Sky Davey MD;  Location:  GI     ESOPHAGOSCOPY, GASTROSCOPY, DUODENOSCOPY (EGD), COMBINED N/A 2015    Procedure: COMBINED ESOPHAGOSCOPY, GASTROSCOPY, DUODENOSCOPY (EGD), BIOPSY SINGLE OR MULTIPLE;  Surgeon: Sky Davey  MD Pierre;  Location:  GI     EYE SURGERY       LAPAROSCOPY, SURGICAL; REPAIR INCISIONAL OR VENTRAL HERNIA       ORTHOPEDIC SURGERY       HERMINIA EN Y BOWEL  1990     WRIST SURGERY       Current Outpatient Prescriptions   Medication     sulfamethoxazole-trimethoprim (BACTRIM/SEPTRA) 400-80 MG per tablet     topiramate (TOPAMAX) 200 MG tablet     vilazodone (VIIBRYD) 40 MG TABS tablet     prazosin (MINIPRESS) 5 MG capsule     ARIPiprazole (ABILIFY) 2 MG tablet     metFORMIN (GLUCOPHAGE-XR) 500 MG 24 hr tablet     cycloSPORINE modified (GENERIC EQUIVALENT) 25 MG capsule     albuterol (PROAIR HFA/PROVENTIL HFA/VENTOLIN HFA) 108 (90 BASE) MCG/ACT Inhaler     order for DME     Vaginal Lubricant (REPLENS) GEL     furosemide (LASIX) 20 MG tablet     blood glucose monitoring (ACCU-CHEK RALPH PLUS) meter device kit     omeprazole (PRILOSEC) 40 MG capsule     simvastatin (ZOCOR) 20 MG tablet     Polyvinyl Alcohol-Povidone (REFRESH OP)     latanoprost (XALATAN) 0.005 % ophthalmic solution     blood glucose monitoring (NO BRAND SPECIFIED) meter device kit     blood glucose monitoring (NO BRAND SPECIFIED) test strip     blood glucose monitoring (SOFTCLIX) lancets     mycophenolate (CELLCEPT - GENERIC EQUIVALENT) 250 MG capsule     acetylcysteine (N-ACETYL-L-CYSTEINE) 600 MG CAPS capsule     ondansetron (ZOFRAN-ODT) 4 MG disintegrating tablet     Cholecalciferol (VITAMIN D) 1000 UNITS capsule     aspirin EC 81 MG EC tablet     acetaminophen (TYLENOL) 325 MG tablet     cyanocolbalamin (VITAMIN  B-12) 1000 MCG tablet     diphenhydrAMINE (BENADRYL) 25 MG capsule     econazole nitrate 1 % cream     No current facility-administered medications for this visit.      Immunization History   Administered Date(s) Administered     HepB 02/04/2010, 03/17/2010, 08/09/2010     Influenza (H1N1) 11/24/2009     Influenza (IIV3) PF 09/01/2008, 10/26/2011, 09/15/2012, 10/01/2013, 10/01/2014, 10/01/2015     Influenza Vaccine IM 3yrs+ 4 Valent IIV4  2016     Influenza Vaccine, 3 YRS +, IM (QUADRIVALENT W/PRESERVATIVES) 2017     Mantoux 02/15/2010     Pneumococcal 23 valent 2008     TDAP Vaccine (Boostrix) 10/12/2012     Tetanus 2005     Social History     Social History     Marital status:      Spouse name: Rahat     Number of children: 2     Years of education: N/A     Occupational History           part-time     Social History Main Topics     Smoking status: Former Smoker     Smokeless tobacco: Never Used     Alcohol use No     Drug use: No     Sexual activity: Yes     Partners: Male     Birth control/ protection: None      Comment: 1 partner     Other Topics Concern     Not on file     Social History Narrative       This is a LOW risk surgery.      HPI:   Reason for surgery: Left wrist repair previous fracture not healed. Previous carpal tunnel surgery.  Pain in left wrist with severe degenerative changes since July after playing golf noted pain.    Cardiovascular Risk:  This patient ambulates without assist without chest pain. She IS able to climb a flight of stairs without chest pain.  The patient does not have chest pain at Rest.  She does not have a history of known cardiac disease.   The patient does not have a history of stroke, and does not have a history of valvular disease.    Pulmonary Risk:  In terms of risk factors for pulmonary complications, the patient does not have a history of lung problems. She has exercise induced asthma stable.    Perioperative Complications:  The patient does not have a history of bleeding or clotting problems in the past. The patient has not had complications from past surgeries.  The patient does not have a family history of any anesthesia or surgical complications. One sister  after surgical complications.      Answers for HPI/ROS submitted by the patient on 2017   General Symptoms: No  Skin Symptoms: No  HENT Symptoms: No  EYE SYMPTOMS: No  HEART SYMPTOMS: No  LUNG SYMPTOMS:  No  INTESTINAL SYMPTOMS: No  URINARY SYMPTOMS: No she had renal transplant on Cyclosporin  GYNECOLOGIC SYMPTOMS: No  BREAST SYMPTOMS: No  SKELETAL SYMPTOMS: No  BLOOD SYMPTOMS: No  NERVOUS SYSTEM SYMPTOMS: No  MENTAL HEALTH SYMPTOMS: Yes PTSD takes  Prazosin 5 mg three at bedtime for nightmares  Nervous or Anxious: Yes stable mental health  Depression: Yes stable mental health  Trouble sleeping: Yes stable mental health  Trouble thinking or concentrating: Yes  Mood changes: Yes stable mental health  Panic attacks: Yes stable mental health      ROS:  Constitutional: no fevers, night sweats or unintentional weight change   Eyes:She wears glasses no recent vision change, diplopia or red eyes   Ears, Nose, Mouth, Throat: no tinnitus or hearing change, no epistaxis or nasal discharge, no oral lesions, throat clear   Cardiovascular: no chest pain, palpitations, or pain with walking, no orthopnea or PND   Respiratory: no dyspnea, cough, shortness of breath or wheezing   GI: previous gastric bypass, no recent nausea, vomiting, diarrhea or constipation, no abdominal pain   : previous renal transplant no change in urine, no dysuria or hematuria  Musculoskeletal: bilateral wrist pains, osteoporosis   Integumentary: no concerning lesions or moles   Neuro: no loss of strength or sensation, no numbness or tingling, no tremor, no dizziness, no headache   Endo: no polyuria or polydipsia, no temperature intolerance   Heme/Lymph: no concerning bumps, no bleeding problems   Allergy: no environmental allergies Allergies to medication noted  Psych: she has PTSD, depression, anxiety stable on current medications      Allergies   Allergen Reactions     Percocet [Oxycodone-Acetaminophen] Nausea and Vomiting     Novocain [Procaine Hcl] Hives     Had reaction 25 years ago to old renetta. Pt reports multiple injections of lidocaine since then without reaction.  Tolerated lidocaine injection today without difficulty.  Osmar Mark MD IR  "Service.       PHYSICAL EXAM:  /77 (BP Location: Right arm, Patient Position: Chair, Cuff Size: Adult Regular)  Pulse 62  Temp 98  F (36.7  C) (Oral)  Resp 16  Ht 1.549 m (5' 1\")  Wt 74.5 kg (164 lb 4.8 oz)  SpO2 98%  Breastfeeding? No  BMI 31.04 kg/m2    Wt Readings from Last 1 Encounters:   17 74.5 kg (164 lb 4.8 oz)       Constitutional: no distress, comfortable, pleasant, overweight  Eyes: anicteric, normal extra-ocular movements   Ears, Nose and Throat: tympanic membranes clear, nose clear and free of lesions, throat clear, neck supple with full range of motion, no thyromegaly.   Cardiovascular: regular rate and rhythm, normal S1 and S2, no murmurs, rubs or gallops, peripheral pulses full and symmetric  Radial aspect  Respiratory: clear to auscultation, no wheezes or crackles, normal breath sounds   Gastrointestinal: positive bowel sounds, nontender, no hepatosplenomegaly, no masses, renal transplant in right lower abdomen   Musculoskeletal: left wrist in splint, right with well healed scars arthritis changes   Skin: no concerning lesions, no jaundice   Neurological: cranial nerves intact, good strength,  normal gait, no tremor   Psychological: appropriate mood interative  Lymphatic: no cervical adenopathy      A/P:    The patient with   Patient Active Problem List   Diagnosis     Autosomal dominant polycystic kidney disease     Hypertension     Hyperlipidemia     Abnormal MRI, cervical spine     Sensory loss     Premature menopause age 37     OP (osteoporosis) T score -3.8     LION on CPAP     Major depressive disorder, recurrent episode, moderate (H)     Generalized anxiety disorder     CMC DJD(carpometacarpal degenerative joint disease), localized primary     Pain in joint, forearm -- L unhealed Fx     -donor kidney transplant     Immunosuppressed status (H)     Hyperparathyroidism, secondary (H)     Hyperparathyroidism (H)     Senile osteoporosis     Pain in joint involving ankle " and foot     Nightmares associated with chronic post-traumatic stress disorder     Type 2 diabetes mellitus with peripheral neuropathy (H)     Posttraumatic stress disorder     Right knee pain     Left knee pain     Age-related osteoporosis without current pathological fracture     Past Medical History:   Diagnosis Date     Abnormal MRI, cervical spine 10/15/2011    2011; mild changes noted. Study done for left arm symptoms Impression:  1. Mild multilevel degenerative disc disease with no significant canal or neural stenosis seen. motion artifact on the STIR images in these are not interpretable. The remaining images were interpreted      Autosomal dominant polycystic kidney disease 2011     (Problem list name updated by automated process. Provider to review and confirm.)     CMC DJD(carpometacarpal degenerative joint disease), localized primary 3/5/2013     -donor kidney transplant 3/20/2014     Depressive disorder 11/15/2012     DM type 2 (diabetes mellitus, type 2) (H) 2013     Encounter for long-term (current) use of other medications 2015     Family history of tremor 10/17/2011     Generalized anxiety disorder 11/15/2012     Glaucoma      Hyperlipidemia 10/15/2011     Hyperparathyroidism, secondary (H) 2015     Hypertension     resolved     Immunosuppressed status (H) 3/20/2014     Major depressive disorder, recurrent episode, moderate (H) 11/15/2012     Obesity (BMI 30-39.9)      OP (osteoporosis) T score -3.8 2009 T-score -3.7      LION (obstructive sleep apnoea) 10/15/2012    intol to cpa     Pain in joint, forearm -- L unhealed Fx 2013     Premature menopause age 37 7/10/2012    OCP (vaginal bldg)-->HT which she stopped 2 mo later documented at 2007 visit (age 51).      Restless leg syndrome      Rib fractures 2013     Sensory loss 10/17/2011    Bottom of feet; uncertain if there is a neuropathy per notes.       Stiffness of joint, not elsewhere  classified, hand 3/5/2013     Tremor 10/15/2011    head     Uncomplicated asthma     presents prior to surgery for assessment of perioperative risk. The patient is at LOW risk for cardiovascular complications and at LOW risk for pulmonary complications of this LOW risk surgery.    --Approval given to proceed with proposed procedure, with further diagnostic evaluation She will have labs done with scheduled blood draw on 12/29/17.    --Patient is currently taking    Anticoagulant or Antiplatelet Medication Use  ASPIRIN: Discontinue ASA 3 days prior to procedure to reduce bleeding risk.  It should be resumed post-operatively.          ASA class 3 - Severe systemic disease, but not incapacitating  No evidence of functionally compromising cardiac or pulmonary status  The patient is recommended to hold aspirin for 3 days prior to surgery.  The patient is instructed as to which medications to take with sips of water the morning of surgery    Proceed with surgery as planned.  No food or liquids the morning of surgery.  Call surgeon if develops respiratory illness, fever, or other illness.      Laboratory studies:    Pregnancy testing was not indicated.    Cardiovascular: EKG was not indicated based on risk assessment.    Elizabeth was seen today for pre-op exam.    Diagnoses and all orders for this visit:    Arthralgia of left forearm  -     CBC with platelets differential; Future  -     Basic metabolic panel; Future    Preop general physical exam  -     CBC with platelets differential; Future  -     Basic metabolic panel; Future      Please contact our office if there are any further questions or information required about this patient.    Jayro Jordan MD    Surgeon (please enter first and last name):  Dr. Francisco Fuentes  Fax number for Preop Evaluation: [phone]  Location of Surgery:  [facility] in [place]  Date of Surgery:  01/05/18  Procedure:   Left Wrist Carpal Tunnel Surgery  History of reaction to  anesthesia?  No    Bertin Doherty CMA ([facility]) at 12:29 PM on 12/11/2017       Jayro Jordan MD

## 2017-12-11 NOTE — NURSING NOTE
Chief Complaint   Patient presents with     Pre-Op Exam     Patient is here for Pre-Op Exam.  DOS 01/05/18 for Left Wrist Carpal Tunnel Surgery by Dr. Francisco Fuentes at [facility] in [facility] ([facility]) at 12:21 PM on 12/11/2017

## 2017-12-11 NOTE — PROGRESS NOTES
Surgeon (please enter first and last name):  Dr. Francisco Fuentes  Fax number for Preop Evaluation: [phone]  Location of Surgery:  [facility] in [place]  Date of Surgery:  01/05/18  Procedure:   Left Wrist Carpal Tunnel Surgery  History of reaction to anesthesia?  No    Bertin Doherty CMA ([facility]) at 12:29 PM on 12/11/2017

## 2017-12-11 NOTE — MR AVS SNAPSHOT
After Visit Summary   12/11/2017    Elizabeth Palma    MRN: [id number]           Patient Information     Date Of Birth          1955        Visit Information        Provider Department      12/11/2017 12:00 PM Jayro oJrdan MD [facility]        Today's Diagnoses     Arthralgia of left forearm    -  1    Preop general physical exam          Care Instructions    Primary Care Center Medication Refill Request Information:  * Please contact your pharmacy regarding ANY request for medication refills.  ** [facility] Prescription Fax = [phone]  * Please allow 3 business days for routine medication refills.  * Please allow 5 business days for controlled substance medication refills.     Primary Care Center Test Result notification information:  *You will be notified with in 7-10 days of your appointment day regarding the results of your test.  If you are on MyChart you will be notified as soon as the provider has reviewed the results and signed off on them.    Primary Care Center [phone]     Your Pre-Op Exam will be faxed to [facility] in [place] at [phone].          Follow-ups after your visit        Follow-up notes from your care team     Write patient with results      Your next 10 appointments already scheduled     Dec 26, 2017  1:00 PM CST   Adult Med Follow UP with Chaparrita Hatch MD   [facility] ([facility])    [address] [place] [address] [phone]            Dec 29, 2017  9:00 AM CST   LAB with  LAB    Health Lab ([facility])    [address]  1st Floor  [facility] [address] [phone]           Please do not eat 10-12 hours before your appointment if you are coming in fasting for labs on lipids, cholesterol, or glucose (sugar). This does not apply to pregnant women. Water, hot tea and black coffee (with nothing added) are okay. Do not drink other  fluids, diet soda or chew gum.            Dec 29, 2017  9:30 AM CST   (Arrive by 9:15 AM)   NUTRITION VISIT with SUJATA Stapleton [facility] ([facility])    [address] [place] [address]   [phone]            Jan 08, 2018  9:30 AM CST   (Arrive by 9:15 AM)   Return Visit with Horacio Sheridan MD   [facility] ([facility])    [address] [place] [address]   [phone]            Jan 19, 2018  9:00 AM CST   LAB with  LAB   [facility] ([facility])    [address] [place] [id number] [phone]           Please do not eat 10-12 hours before your appointment if you are coming in fasting for labs on lipids, cholesterol, or glucose (sugar). This does not apply to pregnant women. Water, hot tea and black coffee (with nothing added) are okay. Do not drink other fluids, diet soda or chew gum.            Jan 19, 2018  9:30 AM CST   (Arrive by 9:15 AM)   NUTRITION VISIT with SUJATA Stapleton [facility] ([facility])    [address] [place] [address]   [phone]            Feb 16, 2018  9:00 AM CST   LAB with  LAB    Health Lab ([facility])    [address] [place] [id number] [phone]           Please do not eat 10-12 hours before your appointment if you are coming in fasting for labs on lipids, cholesterol, or glucose (sugar). This does not apply to pregnant women. Water, hot tea and black coffee (with nothing added) are okay. Do not drink other fluids, diet soda or chew gum.            Feb 16, 2018  9:30 AM CST   (Arrive by 9:15 AM)   NUTRITION VISIT with SUJTAA Stapleton [facility] ([facility])     [address]  4th [facility] [address] [phone]            Feb 19, 2018 12:30 PM CST   Return Visit with Javier Tuttle DPM   [facility] ([facility])    [address] [place] [id number] [phone]            Mar 12, 2018  1:10 PM CDT   (Arrive by 12:40 PM)   Return Kidney Transplant with Christian Jimenez MD   [facility] ([facility])    [address]  3rd [facility] [phone] [phone]              Future tests that were ordered for you today     Open Future Orders        Priority Expected Expires Ordered    CBC with platelets differential Routine 12/29/2017 11/28/2018 12/11/2017    Basic metabolic panel Routine 12/29/2017 12/5/2018 12/11/2017            Who to contact     Please call your clinic at [phone] to:    Ask questions about your health    Make or cancel appointments    Discuss your medicines    Learn about your test results    Speak to your doctor   If you have compliments or concerns about an experience at your clinic, or if you wish to file a complaint, please contact [facility] Patient Relations at [phone] or email us at [email or web address]         Additional Information About Your Visit        MyChart Information     [employer] gives you secure access to your electronic health record. If you see a primary care provider, you can also send messages to your care team and make appointments. If you have questions, please call your primary care clinic.  If you do not have a primary care provider, please call [phone] and they will assist you.      [employer] is an electronic gateway that provides easy, online access to your medical records. With [employer], you can request a clinic appointment, read your test results, renew a prescription or communicate with your care team.     To access your existing account, please contact  "your [facility] or call [phone] for assistance.        Care EveryWhere ID     This is your Care EveryWhere ID. This could be used by other organizations to access your [place] medical records  [id number]        Your Vitals Were     Pulse Temperature Respirations Height Pulse Oximetry Breastfeeding?    62 98  F (36.7  C) (Oral) 16 1.549 m (5' 1\") 98% No    BMI (Body Mass Index)                   31.04 kg/m2            Blood Pressure from Last 3 Encounters:   12/11/17 114/77   11/30/17 104/64   11/28/17 129/72    Weight from Last 3 Encounters:   12/11/17 74.5 kg (164 lb 4.8 oz)   11/30/17 74.4 kg (164 lb)   11/28/17 74.9 kg (165 lb 3.2 oz)                 Today's Medication Changes          These changes are accurate as of: 12/11/17 12:44 PM.  If you have any questions, ask your nurse or doctor.               These medicines have changed or have updated prescriptions.        Dose/Directions    cyanocobalamin 1000 MCG tablet   Commonly known as:  vitamin  B-12   This may have changed:  when to take this   Used for:  CKD (chronic kidney disease) stage 5, GFR less than 15 ml/min (H)        Dose:  1000 mcg   Take 1 tablet by mouth daily.   Quantity:  30 tablet   Refills:  0                Primary Care Provider Office Phone # Fax #    Horacio Sheridan [phone]       [address] [place] [address] 741  [facility] [address]        Equal Access to Services     LINNEA HIDALGO AH: Hadii riky muellero Somartín, waaxda luqadaha, qaybta kaalmada lor, waxyoon jaycee fam. So [facility] [phone].    ATENCIÓN: Si habla español, tiene a goetz disposición servicios gratuitos de asistencia lingüística. Llame al [phone].    We comply with applicable federal civil rights laws and [place] laws. We do not discriminate on the basis of race, color, national origin, age, disability, sex, sexual orientation, or gender identity.            Thank you!     Thank you for " [facility]  for your care. Our goal is always to provide you with excellent care. Hearing back from our patients is one way we can continue to improve our services. Please take a few minutes to complete the written survey that you may receive in the mail after your visit with us. Thank you!             Your Updated Medication List - Protect others around you: Learn how to safely use, store and throw away your medicines at www.disposemymeds.org.          This list is accurate as of: 12/11/17 12:44 PM.  Always use your most recent med list.                   Brand Name Dispense Instructions for use Diagnosis    acetylcysteine 600 MG Caps capsule    N-ACETYL CYSTEINE    30 capsule    Take 2 400 mg caps two times daily for total daily dose of 800 mg        albuterol 108 (90 BASE) MCG/ACT Inhaler    PROAIR HFA/PROVENTIL HFA/VENTOLIN HFA    1 Inhaler    Inhale 2 puffs into the lungs every 6 hours as needed for shortness of breath / dyspnea or wheezing    Exercise-induced asthma       ARIPiprazole 2 MG tablet    ABILIFY    15 tablet    Take 0.5 tablets (1 mg) by mouth daily    Major depressive disorder, recurrent episode, moderate (H)       aspirin 81 MG EC tablet     30 tablet    Take 1 tablet (81 mg) by mouth daily    Kidney replaced by transplant       BENADRYL 25 MG capsule   Generic drug:  diphenhydrAMINE      Take 25 mg by mouth At Bedtime.        blood glucose monitoring lancets     1 Box    Use to test blood sugar 2 times daily or as directed.    Type 2 diabetes mellitus with peripheral neuropathy (H)       * blood glucose monitoring meter device kit    no brand specified    1 kit    Use to test blood sugar 2 times daily or as directed.    Type 2 diabetes mellitus with peripheral neuropathy (H)       * blood glucose monitoring meter device kit           blood glucose monitoring test strip    no brand specified    100 strip    Use to test blood sugars 2 times daily or as directed    Type 2  diabetes mellitus with peripheral neuropathy (H)       cyanocobalamin 1000 MCG tablet    vitamin  B-12    30 tablet    Take 1 tablet by mouth daily.    CKD (chronic kidney disease) stage 5, GFR less than 15 ml/min (H)       cycloSPORINE modified 25 MG capsule    GENERIC EQUIVALENT    300 capsule    Take 5 capsules (125 mg) by mouth 2 times daily    Kidney replaced by transplant       econazole nitrate 1 % cream     85 g    Apply topically daily    Type II or unspecified type diabetes mellitus with neurological manifestations, not stated as uncontrolled(250.60) (H), Dermatophytosis of foot       furosemide 20 MG tablet    LASIX    90 tablet    Take 1 tablet (20 mg) by mouth daily    Generalized edema       latanoprost 0.005 % ophthalmic solution    XALATAN    2.5 mL    Place 1 drop into both eyes At Bedtime    Mild stage glaucoma(365.71)       metFORMIN 500 MG 24 hr tablet    GLUCOPHAGE-XR    90 tablet    Take 3 tablets (1,500 mg) by mouth daily (with dinner)    Type 2 diabetes mellitus with diabetic polyneuropathy, without long-term current use of insulin (H)       mycophenolate 250 MG capsule    GENERIC EQUIVALENT    240 capsule    Take 4 capsules (1,000 mg) by mouth 2 times daily    Kidney transplanted       omeprazole 40 MG capsule    priLOSEC    90 capsule    Take 1 capsule (40 mg) by mouth daily    Gastroesophageal reflux disease without esophagitis       ondansetron 4 MG ODT tab    ZOFRAN-ODT    20 tablet    Take 1 tablet (4 mg) by mouth every 6 hours as needed    Chronic kidney disease, stage V (H)       order for DME     1 Units    Walker with front wheels and a seat.    Fall, initial encounter       prazosin 5 MG capsule    MINIPRESS    90 capsule    Take 3 capsules (15 mg) by mouth At Bedtime    Nightmares associated with chronic post-traumatic stress disorder       REFRESH OP      Apply to eye as needed Both eyes        replens Gel     35 g    Use vaginally as needed. Can use up to 3 times per week.     Vaginal dryness       simvastatin 20 MG tablet    ZOCOR    90 tablet    Take 1 tablet (20 mg) by mouth At Bedtime    Chronic kidney disease, stage V (H)       sulfamethoxazole-trimethoprim 400-80 MG per tablet    BACTRIM/SEPTRA    90 tablet    Take 1 tablet by mouth daily    Immunosuppression (H)       topiramate 200 MG tablet    TOPAMAX    60 tablet    Take 1 tablet (200 mg) by mouth 2 times daily    Morbid obesity due to excess calories (H)       TYLENOL 325 MG tablet   Generic drug:  acetaminophen      Take 325-650 mg by mouth as needed        vilazodone 40 MG Tabs tablet    VIIBRYD    30 tablet    Take 1 tablet (40 mg) by mouth daily    Major depressive disorder, recurrent episode, moderate (H)       vitamin D 1000 UNITS capsule     30 capsule    2,000 Units        * Notice:  This list has 2 medication(s) that are the same as other medications prescribed for you. Read the directions carefully, and ask your doctor or other care provider to review them with you.

## 2017-12-13 DIAGNOSIS — Z94.0 KIDNEY TRANSPLANTED: Primary | ICD-10-CM

## 2017-12-13 RX ORDER — MYCOPHENOLATE MOFETIL 250 MG/1
1000 CAPSULE ORAL 2 TIMES DAILY
Qty: 240 CAPSULE | Refills: 11 | Status: SHIPPED | OUTPATIENT
Start: 2017-12-13 | End: 2018-11-21

## 2017-12-29 ENCOUNTER — ALLIED HEALTH/NURSE VISIT (OUTPATIENT)
Dept: SURGERY | Facility: CLINIC | Age: 62
End: 2017-12-29
Payer: COMMERCIAL

## 2017-12-29 VITALS — HEIGHT: 61 IN | WEIGHT: 161.7 LBS | BODY MASS INDEX: 30.53 KG/M2

## 2017-12-29 DIAGNOSIS — Z48.298 AFTERCARE FOLLOWING ORGAN TRANSPLANT: ICD-10-CM

## 2017-12-29 DIAGNOSIS — Z79.899 ENCOUNTER FOR LONG-TERM CURRENT USE OF MEDICATION: ICD-10-CM

## 2017-12-29 DIAGNOSIS — M25.532 ARTHRALGIA OF LEFT FOREARM: ICD-10-CM

## 2017-12-29 DIAGNOSIS — Z01.818 PREOP GENERAL PHYSICAL EXAM: ICD-10-CM

## 2017-12-29 DIAGNOSIS — Z94.0 KIDNEY REPLACED BY TRANSPLANT: ICD-10-CM

## 2017-12-29 LAB
ANION GAP SERPL CALCULATED.3IONS-SCNC: 8 MMOL/L (ref 3–14)
BASOPHILS # BLD AUTO: 0 10E9/L (ref 0–0.2)
BASOPHILS NFR BLD AUTO: 1.5 %
BUN SERPL-MCNC: 13 MG/DL (ref 7–30)
CALCIUM SERPL-MCNC: 8.6 MG/DL (ref 8.5–10.1)
CHLORIDE SERPL-SCNC: 110 MMOL/L (ref 94–109)
CO2 SERPL-SCNC: 24 MMOL/L (ref 20–32)
CREAT SERPL-MCNC: 0.88 MG/DL (ref 0.52–1.04)
CYCLOSPORINE BLD LC/MS/MS-MCNC: 107 UG/L (ref 50–400)
DIFFERENTIAL METHOD BLD: ABNORMAL
EOSINOPHIL # BLD AUTO: 0.1 10E9/L (ref 0–0.7)
EOSINOPHIL NFR BLD AUTO: 1.8 %
ERYTHROCYTE [DISTWIDTH] IN BLOOD BY AUTOMATED COUNT: 14.1 % (ref 10–15)
GFR SERPL CREATININE-BSD FRML MDRD: 66 ML/MIN/1.7M2
GLUCOSE SERPL-MCNC: 138 MG/DL (ref 70–99)
HCT VFR BLD AUTO: 42.1 % (ref 35–47)
HGB BLD-MCNC: 12.9 G/DL (ref 11.7–15.7)
IMM GRANULOCYTES # BLD: 0 10E9/L (ref 0–0.4)
IMM GRANULOCYTES NFR BLD: 0.4 %
LYMPHOCYTES # BLD AUTO: 0.4 10E9/L (ref 0.8–5.3)
LYMPHOCYTES NFR BLD AUTO: 13.6 %
MCH RBC QN AUTO: 26.7 PG (ref 26.5–33)
MCHC RBC AUTO-ENTMCNC: 30.6 G/DL (ref 31.5–36.5)
MCV RBC AUTO: 87 FL (ref 78–100)
MONOCYTES # BLD AUTO: 0.2 10E9/L (ref 0–1.3)
MONOCYTES NFR BLD AUTO: 7.7 %
NEUTROPHILS # BLD AUTO: 2.1 10E9/L (ref 1.6–8.3)
NEUTROPHILS NFR BLD AUTO: 75 %
NRBC # BLD AUTO: 0 10*3/UL
NRBC BLD AUTO-RTO: 0 /100
PLATELET # BLD AUTO: 162 10E9/L (ref 150–450)
POTASSIUM SERPL-SCNC: 3.8 MMOL/L (ref 3.4–5.3)
RBC # BLD AUTO: 4.83 10E12/L (ref 3.8–5.2)
SODIUM SERPL-SCNC: 141 MMOL/L (ref 133–144)
TME LAST DOSE: NORMAL H
WBC # BLD AUTO: 2.7 10E9/L (ref 4–11)

## 2017-12-29 NOTE — PROGRESS NOTES
"Nutrition Reassessment  Reason For Visit:  Elizabeth Palma is a 62 year-old female with type 2 DM (oral med management) presenting today for nutrition follow-up, s/p \"gastric bypass in 1990 at [facility]\" per Pt report.  She is seeing medical weight management.  She was referred by Dr. Irene.  Note: Pt had a kidney transplant on March 20, 2014.    Pt was accompanied by her .     Anthropometrics:  Height: 61\"  Pre-op Weight: 265 lbs per Pt report; lowest weight after bariatric surgery: 165-170 lbs (25 years ago)  (Pt reported that she initially was at 215 lbs in 2015 prior to seeing writer.)    Current Weight: 161.7 lbs (-3.2 lbs over the past month) with BMI of 30.62.    Current Vitamins/Minerals: 3000 International Units vitamin D/day, Calcium, vitamin C, vitamin B12 daily, B-complex  *Pt stated that she was told not to take other vitamins/minerals by MD.    Nutrition History:  Lactose intolerance ever since bariatric surgery.  Pt stated that she has osteoporosis; however, she stated that her doctors don't want her to take any vitamins or minerals due to her kidney transplant.    Diet/Nutrition Intake Hx: Pt reports her intake varies due to fluctuating appetite. Per pt there are days when she eats 3 meals but on other days she may not eat much at all or nibble on something through out the day. Pt also states her intake is affected by nausea 2/2 her anti-rejection medications. However pt reports she tries to make healthy choices (lower in calorie). Pt is also limited in protein choices that she likes - pt reports she does not take much dairy, does not like beans and lentils, keeps kosher. Advised pt to try to time her meals and eat every 4 hours instead of grazing but pt refused stating she would rather not eat at all.     *Pt stated that she will not record food intake due to the fact that every time she does this, she simply does not eat as she doesn't want to record.  She stated that her therapist " strongly advises against recording food intake for this reason.      Physical Activity Note: Pt reports she has a tendency to break bones so she is limited with what exercises she does. Pt states there is a long hallway in the building that she needs to walk when going out. Pt states she does not walk for exercises but walks only to get ADLs done.      Progress with Previous Goals:    1. Avoid grazing through, Eat 3 meals with lean protein at each meal, along with a non-starchy vegetable or whole fruit, up to 1/2 c carb at a meal.- Meeting.     -Try hard-boiled eggs to put on your salad or to have later in the day to make up for skipping breakfast. - She did go to a vegan restaurant and had a veggie cheese burger.     2. If needing a snack, have vegetables (give at least 2 hours between a meals and a snack). - She did allow herself to have a small candy bar and cookie for the holidays.     3. Walk (with walker) 5-10 min daily, increasing as able. - She just came back from [place] where she was able to walk a lot (2 miles some days).   4. Check vitamin and mineral labs at next doctor appointment due to history of bariatric surgery (vitamin A, Vitamin D, zinc, iron, B12, B1, calcium, PTH). - Pt had her blood drawn for labs today; vitamins/minerals not yet back.     Nutrition Prescription:  Grams Protein: 60 (minimum)  Amount of Fluid: 48-64 oz    Nutrition Diagnosis  Previous: Obesity related to excessive energy intake as evidenced by BMI > 30.- continues    Intervention  Intervention At Appointment:  Materials/Education provided:  Praised Pt for weight loss, discussing importance of continuing with the goals.  Discussed meal planning and optimizing protein.  Encouraged exercise.  Gave encouragement and support to continue.       Patient Understanding: good  Expected Compliance: good    Goals:   1. Avoid grazing through, Eat 3 meals with lean protein at each meal, along with a non-starchy vegetable or whole fruit, up  to 1/2 c carb at a meal.   -Try hard-boiled eggs to put on your salad or to have later in the day to make up for skipping breakfast.    2. If needing a snack, have vegetables (give at least 2 hours between a meals and a snack).  3. Walk 5-10 min daily, increasing as able.  4. Check vitamin and mineral labs at next doctor appointment due to history of bariatric surgery (vitamin A, Vitamin D, zinc, iron, B12, B1, calcium, PTH).    Follow-Up: 1 month    Time spent with patient: 30 minutes.  Francesca Danielle, MS, RDN, LDN, CLT  Pager: [phone]

## 2017-12-29 NOTE — MR AVS SNAPSHOT
MRN:[id number]                      After Visit Summary   12/29/2017    Elizabeth Palma    MRN: [id number]           Visit Information        Provider Department      12/29/2017 9:30 AM Francesca Danielle RD [facility]        Your next 10 appointments already scheduled     Dec 29, 2017  9:30 AM CST   (Arrive by 9:15 AM)   NUTRITION VISIT with SUJATA Stapleton [facility] ([facility])    [address] [place] [address]   [phone]            Jan 02, 2018  1:00 PM CST   Adult Med Follow UP with Chaparrita Hatch MD   [facility] ([facility])    [address]  [facility] [address] [phone]            Jan 08, 2018  9:30 AM CST   (Arrive by 9:15 AM)   Return Visit with Horacio Sheridan MD   [facility] ([facility])    [address] [place] [id number] [phone]            Jan 19, 2018  9:00 AM CST   LAB with  LAB    [facility] ([facility])    [address] [place] [id number] [phone]           Please do not eat 10-12 hours before your appointment if you are coming in fasting for labs on lipids, cholesterol, or glucose (sugar). This does not apply to pregnant women. Water, hot tea and black coffee (with nothing added) are okay. Do not drink other fluids, diet soda or chew gum.            Jan 19, 2018  9:30 AM CST   (Arrive by 9:15 AM)   NUTRITION VISIT with SUJATA Stapleton [facility] ([facility])    [address] [place] [address]   [phone]            Feb 16, 2018  9:00 AM CST   LAB with Induction Manager LAB    Health Lab ([facility])    [address] [place] [address]    [phone]           Please do not eat 10-12 hours before your appointment if you are coming in fasting for labs on lipids, cholesterol, or glucose (sugar). This does not apply to pregnant women. Water, hot tea and black coffee (with nothing added) are okay. Do not drink other fluids, diet soda or chew gum.            Feb 16, 2018  9:30 AM CST   (Arrive by 9:15 AM)   NUTRITION VISIT with Francesca Danielle RD   [facility] ([facility])    [address] [place] [address]  4th [facility] [address]   [phone]            Feb 19, 2018 12:30 PM CST   Return Visit with Javier Tuttle DPM   [facility] ([facility])    [address] [place] [address]   [phone]            Mar 12, 2018 12:00 PM CDT   Lab with  LAB   [facility] ([facility])    [address] [place] [address]   [phone]            Mar 12, 2018  1:10 PM CDT   (Arrive by 12:40 PM)   Return Kidney Transplant with Christian Jimenez MD   [facility] ([facility])    [address] [place] [address]  3rd [facility] [id number] [phone]              Care Instructions    Goals:   1. Avoid grazing through, Eat 3 meals with lean protein at each meal, along with a non-starchy vegetable or whole fruit, up to 1/2 c carb at a meal.   -Try hard-boiled eggs to put on your salad or to have later in the day to make up for skipping breakfast.    2. If needing a snack, have vegetables (give at least 2 hours between a meals and a snack).  3. Walk 5-10 min daily, increasing as able.  4. Check vitamin and mineral labs at next doctor appointment due to history of bariatric surgery (vitamin A, Vitamin D, zinc, iron, B12, B1, calcium, PTH).         MyChart Information     [employer] gives you secure access to your electronic health record. If you see a primary care provider, you  can also send messages to your care team and make appointments. If you have questions, please call your primary care clinic.  If you do not have a primary care provider, please call [phone] and they will assist you.      [employer] is an electronic gateway that provides easy, online access to your medical records. With [employer], you can request a clinic appointment, read your test results, renew a prescription or communicate with your care team.     To access your existing account, please contact your [facility] or call [phone] for assistance.        Care EveryWhere ID     This is your Care EveryWhere ID. This could be used by other organizations to access your [place] medical records  [id number]        Equal Access to Services     LINNEA HIDALGO : Jennifer Quevedo, radha aleman, nick horowitz, ty fam. So [facility] [phone].    ATENCIÓN: Si habla español, tiene a goetz disposición servicios gratuitos de asistencia lingüística. Llame al [phone].    We comply with applicable federal civil rights laws and [place] laws. We do not discriminate on the basis of race, color, national origin, age, disability, sex, sexual orientation, or gender identity.

## 2017-12-29 NOTE — PATIENT INSTRUCTIONS
Goals:   1. Avoid grazing through, Eat 3 meals with lean protein at each meal, along with a non-starchy vegetable or whole fruit, up to 1/2 c carb at a meal.   -Try hard-boiled eggs to put on your salad or to have later in the day to make up for skipping breakfast.    2. If needing a snack, have vegetables (give at least 2 hours between a meals and a snack).  3. Walk 5-10 min daily, increasing as able.  4. Check vitamin and mineral labs at next doctor appointment due to history of bariatric surgery (vitamin A, Vitamin D, zinc, iron, B12, B1, calcium, PTH).

## 2018-01-02 ENCOUNTER — OFFICE VISIT (OUTPATIENT)
Dept: PSYCHIATRY | Facility: CLINIC | Age: 63
End: 2018-01-02
Payer: COMMERCIAL

## 2018-01-02 VITALS
SYSTOLIC BLOOD PRESSURE: 117 MMHG | BODY MASS INDEX: 30.4 KG/M2 | DIASTOLIC BLOOD PRESSURE: 76 MMHG | HEIGHT: 61 IN | HEART RATE: 78 BPM | WEIGHT: 161 LBS

## 2018-01-02 DIAGNOSIS — F51.5 NIGHTMARES ASSOCIATED WITH CHRONIC POST-TRAUMATIC STRESS DISORDER: ICD-10-CM

## 2018-01-02 DIAGNOSIS — F33.1 MAJOR DEPRESSIVE DISORDER, RECURRENT EPISODE, MODERATE (H): Primary | ICD-10-CM

## 2018-01-02 DIAGNOSIS — F43.12 NIGHTMARES ASSOCIATED WITH CHRONIC POST-TRAUMATIC STRESS DISORDER: ICD-10-CM

## 2018-01-02 DIAGNOSIS — F43.10 POSTTRAUMATIC STRESS DISORDER: ICD-10-CM

## 2018-01-02 DIAGNOSIS — F60.81 NARCISSISTIC PERSONALITY DISORDER (H): ICD-10-CM

## 2018-01-02 DIAGNOSIS — F41.9 ANXIETY: ICD-10-CM

## 2018-01-02 ASSESSMENT — PATIENT HEALTH QUESTIONNAIRE - PHQ9: SUM OF ALL RESPONSES TO PHQ QUESTIONS 1-9: 8
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
## 2018-01-02 NOTE — ANESTHESIA POSTPROCEDURE EVALUATION
Patient: Elizabeth Palma    Procedure(s):  Colonoscopy - Wound Class: II-Clean Contaminated    Diagnosis:Special screening for malignant neoplasms  Diagnosis Additional Information: No value filed.    Anesthesia Type:  MAC    Note:  Anesthesia Post Evaluation    Patient location during evaluation: PACU  Patient participation: Able to fully participate in evaluation  Level of consciousness: awake  Pain management: adequate  Airway patency: patent  Cardiovascular status: acceptable  Respiratory status: acceptable  Hydration status: acceptable  PONV: none     Anesthetic complications: None          Last vitals:  There were no vitals filed for this visit.      Electronically Signed By: Darin Jay MD  January 2, 2018  11:29 AM

## 2018-01-02 NOTE — MR AVS SNAPSHOT
After Visit Summary   1/2/2018    Elizabeth Palma    MRN: [id number]           Patient Information     Date Of Birth          1955        Visit Information        Provider Department      1/2/2018 1:00 PM Chaparrita Hatch MD [facility]         Follow-ups after your visit        Follow-up notes from your care team     Return in about 4 weeks (around 1/30/2018) for 30 min. MFU.      Your next 10 appointments already scheduled     Jan 08, 2018  9:30 AM CST   (Arrive by 9:15 AM)   Return Visit with Horacio Sheridan MD   [facility] ([facility])    [address] [place] [id number] [phone]            Jan 19, 2018  9:00 AM CST   LAB with  LAB   [facility] ([facility])    [address] [place] [id number] [phone]           Please do not eat 10-12 hours before your appointment if you are coming in fasting for labs on lipids, cholesterol, or glucose (sugar). This does not apply to pregnant women. Water, hot tea and black coffee (with nothing added) are okay. Do not drink other fluids, diet soda or chew gum.            Jan 19, 2018  9:30 AM CST   (Arrive by 9:15 AM)   NUTRITION VISIT with Francesca Danielle RD   [facility] ([facility])    [address] [place] [phone]   [phone]            Jan 30, 2018  1:00 PM CST   Adult Med Follow UP with Chaparrita Hatch MD   [facility] ([facility])    [address] [place] [address]  [facility] [address]   [phone]            Feb 16, 2018  9:00 AM CST   LAB with  LAB   [facility] ([facility])    [address] [place] [address] [id number] [phone]           Please do not eat 10-12 hours before your appointment if you are coming in fasting for labs on lipids,  cholesterol, or glucose (sugar). This does not apply to pregnant women. Water, hot tea and black coffee (with nothing added) are okay. Do not drink other fluids, diet soda or chew gum.            Feb 16, 2018  9:30 AM CST   (Arrive by 9:15 AM)   NUTRITION VISIT with Francesca Danielle RD   [facility] ([facility])    [address] [place] [address]  [address] [facility] [id number] [phone]            Feb 19, 2018 12:30 PM CST   Return Visit with Javier Tuttle DPM   [facility] ([facility])    [address] [place] [id number] [phone]            Mar 12, 2018 12:00 PM CDT   Lab with  LAB   [facility] ([facility])    [address] [place] [address]  [address] [facility] [place] [address] [phone]            Mar 12, 2018  1:10 PM CDT   (Arrive by 12:40 PM)   Return Kidney Transplant with Christian Jimenez MD   [facility] Nephrology ([facility])    [address] [place] [address]  [address] [facility] [id number] [phone]            May 07, 2018  2:00 PM CDT   (Arrive by 1:45 PM)   RETURN ENDOCRINE with Lizbet Byers MD   [facility] Endocrinology ([facility])    [address] [place] [id number] [phone]              Who to contact     Please call your clinic at [phone] to:    Ask questions about your health    Make or cancel appointments    Discuss your medicines    Learn about your test results    Speak to your doctor   If you have compliments or concerns about an experience at your clinic, or if you wish to file a complaint, please contact [facility] Patient Relations at [phone] or email us at [email or web address]         Additional Information About Your Visit        MyChart Information     [employer] gives you secure access to your electronic health  "record. If you see a primary care provider, you can also send messages to your care team and make appointments. If you have questions, please call your primary care clinic.  If you do not have a primary care provider, please call [phone] and they will assist you.      [employer] is an electronic gateway that provides easy, online access to your medical records. With [employer], you can request a clinic appointment, read your test results, renew a prescription or communicate with your care team.     To access your existing account, please contact your [facility] or call [phone] for assistance.        Care EveryWhere ID     This is your Care EveryWhere ID. This could be used by other organizations to access your [facility] medical records  [id number]        Your Vitals Were     Pulse Height BMI (Body Mass Index)             78 5' 1\" (154.9 cm) 30.42 kg/m2          Blood Pressure from Last 3 Encounters:   01/02/18 117/76   12/11/17 114/77   11/30/17 104/64    Weight from Last 3 Encounters:   01/02/18 161 lb (73 kg)   12/29/17 161 lb 11.2 oz (73.3 kg)   12/11/17 164 lb 4.8 oz (74.5 kg)              Today, you had the following     No orders found for display         Today's Medication Changes          These changes are accurate as of: 1/2/18  1:39 PM.  If you have any questions, ask your nurse or doctor.               These medicines have changed or have updated prescriptions.        Dose/Directions    cyanocobalamin 1000 MCG tablet   Commonly known as:  vitamin  B-12   This may have changed:  when to take this   Used for:  CKD (chronic kidney disease) stage 5, GFR less than 15 ml/min (H)        Dose:  1000 mcg   Take 1 tablet by mouth daily.   Quantity:  30 tablet   Refills:  0                Primary Care Provider Office Phone # Fax #    Horacio Sheridan [phone]       [address] [place] [phone]  [facility] [address]        Equal Access to Services     LINNEA HIDALGO AH: " Hadii aad ku hadnatano Soomaali, waaxda luqadaha, qaybta kaalmada lor, ty fam. So [facility] [phone].    ATENCIÓN: Si ashok price, tiene a goetz disposición servicios gratuitos de asistencia lingüística. Llame al [phone].    We comply with applicable federal civil rights laws and [place] laws. We do not discriminate on the basis of race, color, national origin, age, disability, sex, sexual orientation, or gender identity.            Thank you!     Thank you for choosing [facility]  for your care. Our goal is always to provide you with excellent care. Hearing back from our patients is one way we can continue to improve our services. Please take a few minutes to complete the written survey that you may receive in the mail after your visit with us. Thank you!             Your Updated Medication List - Protect others around you: Learn how to safely use, store and throw away your medicines at www.disposemymeds.org.          This list is accurate as of: 1/2/18  1:39 PM.  Always use your most recent med list.                   Brand Name Dispense Instructions for use Diagnosis    acetylcysteine 600 MG Caps capsule    N-ACETYL CYSTEINE    30 capsule    Take 2 400 mg caps two times daily for total daily dose of 800 mg        albuterol 108 (90 BASE) MCG/ACT Inhaler    PROAIR HFA/PROVENTIL HFA/VENTOLIN HFA    1 Inhaler    Inhale 2 puffs into the lungs every 6 hours as needed for shortness of breath / dyspnea or wheezing    Exercise-induced asthma       ARIPiprazole 2 MG tablet    ABILIFY    15 tablet    Take 0.5 tablets (1 mg) by mouth daily    Major depressive disorder, recurrent episode, moderate (H)       aspirin 81 MG EC tablet     30 tablet    Take 1 tablet (81 mg) by mouth daily    Kidney replaced by transplant       BENADRYL 25 MG capsule   Generic drug:  diphenhydrAMINE      Take 25 mg by mouth At Bedtime.        blood glucose monitoring lancets     1 Box    Use to test  blood sugar 2 times daily or as directed.    Type 2 diabetes mellitus with peripheral neuropathy (H)       * blood glucose monitoring meter device kit    no brand specified    1 kit    Use to test blood sugar 2 times daily or as directed.    Type 2 diabetes mellitus with peripheral neuropathy (H)       * blood glucose monitoring meter device kit           blood glucose monitoring test strip    no brand specified    100 strip    Use to test blood sugars 2 times daily or as directed    Type 2 diabetes mellitus with peripheral neuropathy (H)       cyanocobalamin 1000 MCG tablet    vitamin  B-12    30 tablet    Take 1 tablet by mouth daily.    CKD (chronic kidney disease) stage 5, GFR less than 15 ml/min (H)       cycloSPORINE modified 25 MG capsule    GENERIC EQUIVALENT    300 capsule    Take 5 capsules (125 mg) by mouth 2 times daily    Kidney replaced by transplant       econazole nitrate 1 % cream     85 g    Apply topically daily    Type II or unspecified type diabetes mellitus with neurological manifestations, not stated as uncontrolled(250.60) (H), Dermatophytosis of foot       furosemide 20 MG tablet    LASIX    90 tablet    Take 1 tablet (20 mg) by mouth daily    Generalized edema       latanoprost 0.005 % ophthalmic solution    XALATAN    2.5 mL    Place 1 drop into both eyes At Bedtime    Mild stage glaucoma(365.71)       metFORMIN 500 MG 24 hr tablet    GLUCOPHAGE-XR    90 tablet    Take 3 tablets (1,500 mg) by mouth daily (with dinner)    Type 2 diabetes mellitus with diabetic polyneuropathy, without long-term current use of insulin (H)       mycophenolate 250 MG capsule    GENERIC EQUIVALENT    240 capsule    Take 4 capsules (1,000 mg) by mouth 2 times daily    Kidney transplanted       omeprazole 40 MG capsule    priLOSEC    90 capsule    Take 1 capsule (40 mg) by mouth daily    Gastroesophageal reflux disease without esophagitis       ondansetron 4 MG ODT tab    ZOFRAN-ODT    20 tablet    Take 1 tablet  (4 mg) by mouth every 6 hours as needed    Chronic kidney disease, stage V (H)       order for DME     1 Units    Walker with front wheels and a seat.    Fall, initial encounter       prazosin 5 MG capsule    MINIPRESS    90 capsule    Take 3 capsules (15 mg) by mouth At Bedtime    Nightmares associated with chronic post-traumatic stress disorder       REFRESH OP      Apply to eye as needed Both eyes        replens Gel     35 g    Use vaginally as needed. Can use up to 3 times per week.    Vaginal dryness       simvastatin 20 MG tablet    ZOCOR    90 tablet    Take 1 tablet (20 mg) by mouth At Bedtime    Chronic kidney disease, stage V (H)       sulfamethoxazole-trimethoprim 400-80 MG per tablet    BACTRIM/SEPTRA    90 tablet    Take 1 tablet by mouth daily    Immunosuppression (H)       topiramate 200 MG tablet    TOPAMAX    60 tablet    Take 1 tablet (200 mg) by mouth 2 times daily    Morbid obesity due to excess calories (H)       TYLENOL 325 MG tablet   Generic drug:  acetaminophen      Take 325-650 mg by mouth as needed        vilazodone 40 MG Tabs tablet    VIIBRYD    30 tablet    Take 1 tablet (40 mg) by mouth daily    Major depressive disorder, recurrent episode, moderate (H)       vitamin D 1000 UNITS capsule     30 capsule    2,000 Units        * Notice:  This list has 2 medication(s) that are the same as other medications prescribed for you. Read the directions carefully, and ask your doctor or other care provider to review them with you.

## 2018-01-02 NOTE — ANESTHESIA POSTPROCEDURE EVALUATION
Patient: Elizabeth Palma    Procedure(s):  Colonoscopy - Wound Class: II-Clean Contaminated    Diagnosis:Special screening for malignant neoplasms  Diagnosis Additional Information: No value filed.    Anesthesia Type:  MAC    Note:  Anesthesia Post Evaluation    Last vitals:  There were no vitals filed for this visit.      Electronically Signed By: Darin Jay MD  January 2, 2018  11:30 AM

## 2018-01-08 ENCOUNTER — OFFICE VISIT (OUTPATIENT)
Dept: INTERNAL MEDICINE | Facility: CLINIC | Age: 63
End: 2018-01-08
Payer: COMMERCIAL

## 2018-01-08 VITALS — HEART RATE: 89 BPM | DIASTOLIC BLOOD PRESSURE: 61 MMHG | SYSTOLIC BLOOD PRESSURE: 92 MMHG | RESPIRATION RATE: 16 BRPM

## 2018-01-08 DIAGNOSIS — E11.42 TYPE 2 DIABETES MELLITUS WITH PERIPHERAL NEUROPATHY (H): Primary | ICD-10-CM

## 2018-01-08 DIAGNOSIS — L82.1 SEBORRHEIC KERATOSIS: ICD-10-CM

## 2018-01-08 ASSESSMENT — PAIN SCALES - GENERAL: PAINLEVEL: SEVERE PAIN (7)

## 2018-01-08 NOTE — NURSING NOTE
Chief Complaint   Patient presents with     Recheck Medication     patient states she is here for a surgery follow up and medication check     HARISH WILLIS at 9:05 AM on 1/8/2018.

## 2018-01-08 NOTE — PATIENT INSTRUCTIONS
[facility]: [phone]     [facility] Medication Refill Request Information:  * Please contact your pharmacy regarding ANY request for medication refills.  ** [facility] Prescription Fax = [phone]  * Please allow 3 business days for routine medication refills.  * Please allow 5 business days for controlled substance medication refills.     [facility] Test Result notification information:  *You will be notified with in 7-10 days of your appointment day regarding the results of your test.  If you are on MyChart you will be notified as soon as the provider has reviewed the results and signed off on them.

## 2018-01-08 NOTE — PROGRESS NOTES
"ASSESSMENT/PLAN:  Diabetes - check A1c. If she is <6.5% on the A1c then cut back on Metformin because of the hypoglycemia risk.      Wrist surgery - doing well with recovery    Growths on her  - seborrheic dermatitis.  I do not recommend any treatment for these.    Total time spent 30 minutes.  More than 50% of the time spent with Ms. Palma on counseling / coordinating her care    Horacio Sheridan MD, FACP      Chief complaint:  Elizabeth Palma is a 62 year old female presents for   Chief Complaint   Patient presents with     Recheck Medication     patient states she is here for a surgery follow up and medication check        SUBJECTIVE:  She just had surgery on Friday on her left wrist.  She is using cold packs.  She no longer needs dilaudid and is using Tylenol Q4hr.  She is doing well with the recovery.  Her bones are soft per the surgeon.    She has lost a lot of weight.  She has some \"growths\" along her  which she has not been able to see because of the weight.    She still has low and high glucoses.  No patterns other than stress in her life - not with food.  It will go up to 150.  When she is low she just corrects it and does not test it - she will get very hot and shaking and tunnel vision - she will drink juice when helps in about 10 minutes.    Medications and allergies were reviewed by me today.       Patient Active Problem List    Diagnosis Date Noted     Type 2 diabetes mellitus with peripheral neuropathy (H) 2015     Priority: High     -donor kidney transplant 2014     Priority: High     Major depressive disorder, recurrent episode, moderate (H) 11/15/2012     Priority: High     Autosomal dominant polycystic kidney disease 2011     Priority: High     (Problem list name updated by automated process. Provider to review and confirm.)       OP (osteoporosis) T score -3.8 2009     Priority: High      T-score -3.7       Narcissistic personality disorder " 01/02/2018     Priority: Medium     Age-related osteoporosis without current pathological fracture 08/10/2016     Priority: Medium     Right knee pain 02/08/2016     Priority: Medium     Left knee pain 02/08/2016     Priority: Medium     Posttraumatic stress disorder 11/24/2015     Priority: Medium     Nightmares associated with chronic post-traumatic stress disorder 10/27/2015     Priority: Medium     Pain in joint involving ankle and foot 07/13/2015     Priority: Medium     Senile osteoporosis 05/29/2015     Priority: Medium     Hyperparathyroidism (H) 02/26/2015     Priority: Medium     Problem list name updated by automated process. Provider to review       Hyperparathyroidism, secondary (H) 02/25/2015     Priority: Medium     Immunosuppressed status (H) 03/20/2014     Priority: Medium     Pain in joint, forearm -- L unhealed Fx 05/21/2013     Priority: Medium     CMC DJD(carpometacarpal degenerative joint disease), localized primary 03/05/2013     Priority: Medium     Generalized anxiety disorder 11/15/2012     Priority: Medium     LION on CPAP 10/15/2012     Priority: Medium     Premature menopause age 37 07/10/2012     Priority: Medium     OCP (vaginal bldg)-->HT which she stopped 2 mo later documented at Jan 12, 2007 visit (age 51).       Sensory loss 10/17/2011     Priority: Medium     Bottom of feet; uncertain if there is a neuropathy per notes.        Hypertension 10/15/2011     Priority: Medium     Hyperlipidemia 10/15/2011     Priority: Medium     Abnormal MRI, cervical spine 10/15/2011     Priority: Medium     4/2011; mild changes noted. Study done for left arm symptoms  Impression:   1. Mild multilevel degenerative disc disease with no significant canal  or neural stenosis seen. motion artifact on the STIR images in these  are not interpretable. The remaining images were interpreted         PHYSICAL EXAM:  BP 92/61  Pulse 89  Resp 16  Constitutional: no distress, comfortable, pleasant    Musculoskeletal: left wrist is wrapped from surgery  Skin: pale tan stuck on appearing lesions along the  scar

## 2018-01-08 NOTE — MR AVS SNAPSHOT
After Visit Summary   1/8/2018    Elizabeth Palma    MRN: [id number]           Patient Information     Date Of Birth          1955        Visit Information        Provider Department      1/8/2018 9:30 AM Horacio Sheridan MD [facility]        Today's Diagnoses     Type 2 diabetes mellitus with peripheral neuropathy (H)    -  1      Care Instructions    Primary Care Center: [phone]     Primary Care Center Medication Refill Request Information:  * Please contact your pharmacy regarding ANY request for medication refills.  ** [facility] Prescription Fax = [phone]  * Please allow 3 business days for routine medication refills.  * Please allow 5 business days for controlled substance medication refills.     Primary Care Center Test Result notification information:  *You will be notified with in 7-10 days of your appointment day regarding the results of your test.  If you are on MyChart you will be notified as soon as the provider has reviewed the results and signed off on them.            Follow-ups after your visit        Your next 10 appointments already scheduled     Jan 19, 2018  9:00 AM CST   LAB with [facility] ([facility])    [address] [place] [address] [phone]           Please do not eat 10-12 hours before your appointment if you are coming in fasting for labs on lipids, cholesterol, or glucose (sugar). This does not apply to pregnant women. Water, hot tea and black coffee (with nothing added) are okay. Do not drink other fluids, diet soda or chew gum.            Jan 19, 2018  9:30 AM CST   (Arrive by 9:15 AM)   NUTRITION VISIT with Francesca Danielle RD   [facility] ([facility])    [address] [place] [address] [phone]            Jan 30, 2018  1:00 PM CST   Adult Med Follow UP with Chaparrita Hatch MD    [facility] ([facility])    [address]  [facility] [address]   [phone]            Feb 16, 2018  9:00 AM CST   LAB with  LAB   [facility] ([facility])    [address] [facility]  [address] [facility] [id number] [phone]           Please do not eat 10-12 hours before your appointment if you are coming in fasting for labs on lipids, cholesterol, or glucose (sugar). This does not apply to pregnant women. Water, hot tea and black coffee (with nothing added) are okay. Do not drink other fluids, diet soda or chew gum.            Feb 16, 2018  9:30 AM CST   (Arrive by 9:15 AM)   NUTRITION VISIT with Francesca Danielle RD   [facility] ([facility])    [address] [place] [address]  [address] [facility] [id number] [phone]            Feb 19, 2018 12:30 PM CST   Return Visit with Javier Tuttle DPM   [facility] ([facility])    [address] [place] [id number] [phone]            Mar 12, 2018 12:00 PM CDT   Lab with  LAB   [facility] ([facility])    [address] [place] [address]  [address] [facility] [id number] [phone]            Mar 12, 2018  1:10 PM CDT   (Arrive by 12:40 PM)   Return Kidney Transplant with Christian Jimenez MD   [facility] Nephrology ([facility])    [address] [place] [address]  [address]  [facility] [id number] [phone]            May 07, 2018  2:00 PM CDT   (Arrive by 1:45 PM)   RETURN ENDOCRINE with Lizbet Byers MD   [facility] Endocrinology ([facility])    [address] [place] [address]  [address] [facility] [id number] [phone]              Future tests that were ordered for you today     Open Future Orders        Priority Expected Expires Ordered    HEMOGLOBIN A1C -(Today) Routine 1/8/2018 1/8/2019 1/8/2018     Albumin Random Urine Quantitative with Creat Ratio Routine 1/8/2018 1/8/2019 1/8/2018            Who to contact     Please call your clinic at [phone] to:    Ask questions about your health    Make or cancel appointments    Discuss your medicines    Learn about your test results    Speak to your doctor   If you have compliments or concerns about an experience at your clinic, or if you wish to file a complaint, please contact [facility] Patient Relations at [phone] or email us at [email or web address]         Additional Information About Your Visit        [employer] Weekend Information     VOZ gives you secure access to your electronic health record. If you see a primary care provider, you can also send messages to your care team and make appointments. If you have questions, please call your primary care clinic.  If you do not have a primary care provider, please call [phone] and they will assist you.      VOZ is an electronic gateway that provides easy, online access to your medical records. With [employer]Z, you can request a clinic appointment, read your test results, renew a prescription or communicate with your care team.     To access your existing account, please contact your [facility] or call [phone] for assistance.        Care EveryWhere ID     This is your Care EveryWhere ID. This could be used by other organizations to access your [facility] medical records  [id number]        Your Vitals Were     Pulse Respirations                89 16           Blood Pressure from Last 3 Encounters:   01/08/18 92/61   01/02/18 117/76   12/11/17 114/77    Weight from Last 3 Encounters:   01/02/18 73 kg (161 lb)   12/29/17 73.3 kg (161 lb 11.2 oz)   12/11/17 74.5 kg (164 lb 4.8 oz)                 Today's Medication Changes          These changes are accurate as of: 1/8/18 10:13 AM.  If you have any questions, ask your nurse or doctor.                These medicines have changed or have updated prescriptions.        Dose/Directions    cyanocobalamin 1000 MCG tablet   Commonly known as:  vitamin  B-12   This may have changed:  when to take this   Used for:  CKD (chronic kidney disease) stage 5, GFR less than 15 ml/min (H)        Dose:  1000 mcg   Take 1 tablet by mouth daily.   Quantity:  30 tablet   Refills:  0                Primary Care Provider Office Phone # Fax #    Horacio Sheridan [phone]       [address] [place] [address]        Equal Access to Services     [facility] : Hadii riky lehman hadasho Soomaali, waaxda luqadaha, qaybta kaalmada adeegyajuly, ty agrawal . So [facility] [phone].    ATENCIÓN: Si habla español, tiene a goetz disposición servicios gratuitos de asistencia lingüística. [facility] [phone].    We comply with applicable federal civil rights laws and [place] laws. We do not discriminate on the basis of race, color, national origin, age, disability, sex, sexual orientation, or gender identity.            Thank you!     Thank you for choosing [facility]  for your care. Our goal is always to provide you with excellent care. Hearing back from our patients is one way we can continue to improve our services. Please take a few minutes to complete the written survey that you may receive in the mail after your visit with us. Thank you!             Your Updated Medication List - Protect others around you: Learn how to safely use, store and throw away your medicines at www.disposemymeds.org.          This list is accurate as of: 1/8/18 10:13 AM.  Always use your most recent med list.                   Brand Name Dispense Instructions for use Diagnosis    acetylcysteine 600 MG Caps capsule    N-ACETYL CYSTEINE    30 capsule    Take 2 400 mg caps two times daily for total daily dose of 800 mg        albuterol 108 (90 BASE) MCG/ACT Inhaler    PROAIR HFA/PROVENTIL  HFA/VENTOLIN HFA    1 Inhaler    Inhale 2 puffs into the lungs every 6 hours as needed for shortness of breath / dyspnea or wheezing    Exercise-induced asthma       ARIPiprazole 2 MG tablet    ABILIFY    15 tablet    Take 0.5 tablets (1 mg) by mouth daily    Major depressive disorder, recurrent episode, moderate (H)       aspirin 81 MG EC tablet     30 tablet    Take 1 tablet (81 mg) by mouth daily    Kidney replaced by transplant       BENADRYL 25 MG capsule   Generic drug:  diphenhydrAMINE      Take 25 mg by mouth At Bedtime.        blood glucose monitoring lancets     1 Box    Use to test blood sugar 2 times daily or as directed.    Type 2 diabetes mellitus with peripheral neuropathy (H)       * blood glucose monitoring meter device kit    no brand specified    1 kit    Use to test blood sugar 2 times daily or as directed.    Type 2 diabetes mellitus with peripheral neuropathy (H)       * blood glucose monitoring meter device kit           blood glucose monitoring test strip    no brand specified    100 strip    Use to test blood sugars 2 times daily or as directed    Type 2 diabetes mellitus with peripheral neuropathy (H)       cyanocobalamin 1000 MCG tablet    vitamin  B-12    30 tablet    Take 1 tablet by mouth daily.    CKD (chronic kidney disease) stage 5, GFR less than 15 ml/min (H)       cycloSPORINE modified 25 MG capsule    GENERIC EQUIVALENT    300 capsule    Take 5 capsules (125 mg) by mouth 2 times daily    Kidney replaced by transplant       econazole nitrate 1 % cream     85 g    Apply topically daily    Type II or unspecified type diabetes mellitus with neurological manifestations, not stated as uncontrolled(250.60) (H), Dermatophytosis of foot       furosemide 20 MG tablet    LASIX    90 tablet    Take 1 tablet (20 mg) by mouth daily    Generalized edema       latanoprost 0.005 % ophthalmic solution    XALATAN    2.5 mL    Place 1 drop into both eyes At Bedtime    Mild stage glaucoma(365.71)        metFORMIN 500 MG 24 hr tablet    GLUCOPHAGE-XR    90 tablet    Take 3 tablets (1,500 mg) by mouth daily (with dinner)    Type 2 diabetes mellitus with diabetic polyneuropathy, without long-term current use of insulin (H)       mycophenolate 250 MG capsule    GENERIC EQUIVALENT    240 capsule    Take 4 capsules (1,000 mg) by mouth 2 times daily    Kidney transplanted       omeprazole 40 MG capsule    priLOSEC    90 capsule    Take 1 capsule (40 mg) by mouth daily    Gastroesophageal reflux disease without esophagitis       ondansetron 4 MG ODT tab    ZOFRAN-ODT    20 tablet    Take 1 tablet (4 mg) by mouth every 6 hours as needed    Chronic kidney disease, stage V (H)       order for DME     1 Units    Walker with front wheels and a seat.    Fall, initial encounter       prazosin 5 MG capsule    MINIPRESS    90 capsule    Take 3 capsules (15 mg) by mouth At Bedtime    Nightmares associated with chronic post-traumatic stress disorder       REFRESH OP      Apply to eye as needed Both eyes        replens Gel     35 g    Use vaginally as needed. Can use up to 3 times per week.    Vaginal dryness       simvastatin 20 MG tablet    ZOCOR    90 tablet    Take 1 tablet (20 mg) by mouth At Bedtime    Chronic kidney disease, stage V (H)       sulfamethoxazole-trimethoprim 400-80 MG per tablet    BACTRIM/SEPTRA    90 tablet    Take 1 tablet by mouth daily    Immunosuppression (H)       topiramate 200 MG tablet    TOPAMAX    60 tablet    Take 1 tablet (200 mg) by mouth 2 times daily    Morbid obesity due to excess calories (H)       TYLENOL 325 MG tablet   Generic drug:  acetaminophen      Take 325-650 mg by mouth as needed        vilazodone 40 MG Tabs tablet    VIIBRYD    30 tablet    Take 1 tablet (40 mg) by mouth daily    Major depressive disorder, recurrent episode, moderate (H)       vitamin D 1000 UNITS capsule     30 capsule    2,000 Units        * Notice:  This list has 2 medication(s) that are the same as other  medications prescribed for you. Read the directions carefully, and ask your doctor or other care provider to review them with you.

## 2018-01-19 ENCOUNTER — ALLIED HEALTH/NURSE VISIT (OUTPATIENT)
Dept: SURGERY | Facility: CLINIC | Age: 63
End: 2018-01-19
Payer: COMMERCIAL

## 2018-01-19 VITALS — BODY MASS INDEX: 30.29 KG/M2 | WEIGHT: 160.3 LBS

## 2018-01-19 DIAGNOSIS — Z94.0 KIDNEY REPLACED BY TRANSPLANT: ICD-10-CM

## 2018-01-19 DIAGNOSIS — E11.42 TYPE 2 DIABETES MELLITUS WITH PERIPHERAL NEUROPATHY (H): ICD-10-CM

## 2018-01-19 DIAGNOSIS — Z79.899 ENCOUNTER FOR LONG-TERM CURRENT USE OF MEDICATION: ICD-10-CM

## 2018-01-19 DIAGNOSIS — Z48.298 AFTERCARE FOLLOWING ORGAN TRANSPLANT: ICD-10-CM

## 2018-01-19 LAB
CREAT UR-MCNC: 302 MG/DL
CYCLOSPORINE BLD LC/MS/MS-MCNC: 103 UG/L (ref 50–400)
HBA1C MFR BLD: 6.4 % (ref 4.3–6)
MICROALBUMIN UR-MCNC: 45 MG/L
MICROALBUMIN/CREAT UR: 14.97 MG/G CR (ref 0–25)
TME LAST DOSE: NORMAL H

## 2018-01-19 NOTE — PROGRESS NOTES
"Nutrition Reassessment  Reason For Visit:  Elizabeth Palma is a 62 year-old female with type 2 DM (oral med management) presenting today for nutrition follow-up, s/p \"gastric bypass in 1990 at [facility]\" per Pt report.  She is seeing medical weight management.  She was referred by Dr. Irene.  Note: Pt had a kidney transplant on March 20, 2014.    Pt was accompanied by her .     Anthropometrics:  Height: 61\"  Pre-op Weight: 265 lbs per Pt report; lowest weight after bariatric surgery: 165-170 lbs (25 years ago)  (Pt reported that she initially was at 215 lbs in 2015 prior to seeing writer.)    Current Weight: 160.3 lbs (-1.4 lbs over the past month) with BMI of 30.62.    Current Vitamins/Minerals: 3000 International Units vitamin D/day, Calcium, vitamin C, vitamin B12 daily, B-complex  *Pt stated that she was told not to take other vitamins/minerals by MD.    Nutrition History:  Lactose intolerance ever since bariatric surgery.  Pt stated that she has osteoporosis; however, she stated that her doctors don't want her to take any vitamins or minerals due to her kidney transplant.    Diet/Nutrition Intake Hx: Pt reports her intake varies due to fluctuating appetite. Per pt there are days when she eats 3 meals but on other days she may not eat much at all or nibble on something through out the day. Pt also states her intake is affected by nausea 2/2 her anti-rejection medications. However pt reports she tries to make healthy choices (lower in calorie). Pt is also limited in protein choices that she likes - pt reports she does not take much dairy, does not like beans and lentils, keeps kosher. Advised pt to try to time her meals and eat every 4 hours instead of grazing but pt refused stating she would rather not eat at all.     *Pt stated that she will not record food intake due to the fact that every time she does this, she simply does not eat as she doesn't want to record.  She stated that her therapist " strongly advises against recording food intake for this reason.      Physical Activity Note: Pt reports she has a tendency to break bones so she is limited with what exercises she does. Pt states there is a long hallway in the building that she needs to walk when going out. Pt states she does not walk for exercises but walks only to get ADLs done.      Progress with Previous Goals:    1. Avoid grazing through, Eat 3 meals with lean protein at each meal, along with a non-starchy vegetable or whole fruit, up to 1/2 c carb at a meal. - Grazing throughout the day on good things like protein(more fish) and vegetables   -Try hard-boiled eggs to put on your salad or to have later in the day to make up for skipping breakfast.    2. If needing a snack, have vegetables (give at least 2 hours between a meals and a snack). Meeting/continues.  Cup of tea or bullion   3. Walk 5-10 min daily, increasing as able.  Met/continues  4. Check vitamin and mineral labs at next doctor appointment due to history of bariatric surgery (vitamin A, Vitamin D, zinc, iron, B12, B1, calcium, PTH). Labs have not been ordered per pt, pt states Dr Irene does not feel there is a need to order these vitamins.     Nutrition Prescription:  Grams Protein: 60 (minimum)  Amount of Fluid: 48-64 oz    Nutrition Diagnosis  Previous: Obesity related to excessive energy intake as evidenced by BMI > 30.- continues    Intervention  Intervention At Appointment:  Materials/Education provided:  Praised Pt for weight loss, discussing importance of continuing with the goals.  Discussed meal planning and optimizing protein.  Encouraged exercise.  Gave encouragement and support to continue.       Patient Understanding: good  Expected Compliance: good    Goals:   1. Avoid grazing through, Eat 3 meals with lean protein at each meal, along with a non-starchy vegetable or whole fruit, up to 1/2 c carb at a meal.   -Try hard-boiled eggs to put on your salad or to have  later in the day to make up for skipping breakfast.    2. If needing a snack, have vegetables (give at least 2 hours between a meals and a snack).  3. Walk 5-10 min daily, increasing as able.  4. Check vitamin and mineral labs at next doctor appointment due to history of bariatric surgery (vitamin A, Vitamin D, zinc, iron, B12, B1, calcium, PTH).    Follow-Up: 1 month    Time spent with patient: 30 minutes.  Leyla Guerrero RD, LD

## 2018-01-25 ENCOUNTER — TELEPHONE (OUTPATIENT)
Dept: TRANSPLANT | Facility: CLINIC | Age: 63
End: 2018-01-25

## 2018-01-25 NOTE — TELEPHONE ENCOUNTER
Prior Authorization Specialty Medication Request    Medication/Dose: Mycophenolate 250 mg   Diagnosis and ICD: Kidney transplant Z94.0  New/Renewal/Insurance Change PA: [facility] [phone]    Important Lab Values:     Previously Tried and Failed Therapies:     Rationale:     Would you like to include any research articles?    If yes please include the hyperlink(s) below or fax @ [phone].    (Include Name and MRN)    If you received a fax notification from an outside Pharmacy;  Pharmacy Name:  Pharmacy #:  Pharmacy Fax:

## 2018-01-29 NOTE — TELEPHONE ENCOUNTER
[facility] Prior Authorization Team   Phone: [phone]  Fax: [phone]    Prior Authorization Not Needed per Insurance    Medication: mycophenolate 250 MG capsule   Insurance Company: [employer]   Expected [employer]Pay: refill too soon until 02/09/2018     Pharmacy Filling the Rx:    Pharmacy Notified:    Patient Notified:      Patient had Medicare at the time of their transplant so their part B coverage will cover their immunos

## 2018-01-30 ENCOUNTER — OFFICE VISIT (OUTPATIENT)
Dept: PSYCHIATRY | Facility: CLINIC | Age: 63
End: 2018-01-30
Payer: MEDICARE

## 2018-01-30 VITALS
RESPIRATION RATE: 16 BRPM | DIASTOLIC BLOOD PRESSURE: 79 MMHG | SYSTOLIC BLOOD PRESSURE: 132 MMHG | HEIGHT: 61 IN | HEART RATE: 91 BPM | BODY MASS INDEX: 30.21 KG/M2 | WEIGHT: 160 LBS | TEMPERATURE: 97.8 F

## 2018-01-30 DIAGNOSIS — F43.12 NIGHTMARES ASSOCIATED WITH CHRONIC POST-TRAUMATIC STRESS DISORDER: ICD-10-CM

## 2018-01-30 DIAGNOSIS — F33.1 MAJOR DEPRESSIVE DISORDER, RECURRENT EPISODE, MODERATE (H): ICD-10-CM

## 2018-01-30 DIAGNOSIS — F51.5 NIGHTMARES ASSOCIATED WITH CHRONIC POST-TRAUMATIC STRESS DISORDER: ICD-10-CM

## 2018-01-30 RX ORDER — VILAZODONE HYDROCHLORIDE 40 MG/1
40 TABLET ORAL DAILY
Qty: 30 TABLET | Refills: 3 | Status: CANCELLED | OUTPATIENT
Start: 2018-01-30

## 2018-01-30 RX ORDER — ARIPIPRAZOLE 2 MG/1
1 TABLET ORAL DAILY
Qty: 15 TABLET | Refills: 3 | Status: CANCELLED | OUTPATIENT
Start: 2018-01-30

## 2018-01-30 ASSESSMENT — PAIN SCALES - GENERAL: PAINLEVEL: SEVERE PAIN (6)

## 2018-01-30 NOTE — MR AVS SNAPSHOT
After Visit Summary   1/30/2018    Elizabeth Palma    MRN: [id number]           Patient Information     Date Of Birth          1955        Visit Information        Provider Department      1/30/2018 1:00 PM Chaparrita Hatch MD [facility]        Today's Diagnoses     Major depressive disorder, recurrent episode, moderate (H)           Follow-ups after your visit        Follow-up notes from your care team     Return in about 4 weeks (around 2/27/2018) for 30 min. [facility].      Your next 10 appointments already scheduled     Feb 16, 2018  9:00 AM CST   LAB with  LAB   [facility] ([place] [facility])    [address]  [address] [facility] [id number] [phone]           Please do not eat 10-12 hours before your appointment if you are coming in fasting for labs on lipids, cholesterol, or glucose (sugar). This does not apply to pregnant women. Water, hot tea and black coffee (with nothing added) are okay. Do not drink other fluids, diet soda or chew gum.            Feb 16, 2018  9:30 AM CST   (Arrive by 9:15 AM)   NUTRITION VISIT with Leyla Guerrero RD   [facility] ([place] [facility])    [address] [place] [address]  4th [facility] [id number] [phone]            Feb 19, 2018 12:30 PM CST   Return Visit with Javier Tuttle DPM   [facility] ([facility])    [address] [place] [id number] [phone]            Feb 27, 2018  1:00 PM CST   Adult Med Follow UP with Chaparrita Hatch MD   [facility] ([facility])    [address]  [facility] [address]   [phone]            Mar 12, 2018 12:00 PM CDT   Lab with  LAB   [facility] ([place] [facility])    [address] [place] [address]  [address]st [facility] [id number] [phone]            Mar 12, 2018  1:10 PM CDT   (Arrive by 12:40 PM)    Return Kidney Transplant with Christian Jimenez MD   [facility] ([facility])    [address]  [address]  [facility] [id number] [phone]            Apr 16, 2018 10:30 AM CDT   (Arrive by 10:15 AM)   Return Visit with Horacio Sheridan MD   [facility] ([facility])    [address]  4th Floor  [facility] [id number] [phone]            May 07, 2018  2:00 PM CDT   (Arrive by 1:45 PM)   RETURN ENDOCRINE with Lizbet Byers MD   [facility] ([facility])    [address]  3rd Floor  [facility] [address] [phone]              Who to contact     Please call your clinic at [phone] to:    Ask questions about your health    Make or cancel appointments    Discuss your medicines    Learn about your test results    Speak to your doctor   If you have compliments or concerns about an experience at your clinic, or if you wish to file a complaint, please contact [facility] Patient Relations at [phone] or email us at [email or web address]         Additional Information About Your Visit        [employer] Information     [facility] gives you secure access to your electronic health record. If you see a primary care provider, you can also send messages to your care team and make appointments. If you have questions, please call your primary care clinic.  If you do not have a primary care provider, please call [phone] and they will assist you.      [facility] is an electronic gateway that provides easy, online access to your medical records. With [facility], you can request a clinic appointment, read your test results, renew a prescription or communicate with your care team.     To access your existing account, please contact your [facility] or call [phone] for assistance.        Care  "EveryWhere ID     This is your Care EveryWhere ID. This could be used by other organizations to access your [facility] medical records  [id number]        Your Vitals Were     Pulse Temperature Respirations Height BMI (Body Mass Index)       91 97.8  F (36.6  C) 16 5' 1\" (154.9 cm) 30.23 kg/m2        Blood Pressure from Last 3 Encounters:   01/30/18 132/79   01/08/18 92/61   01/02/18 117/76    Weight from Last 3 Encounters:   01/30/18 160 lb (72.6 kg)   01/19/18 160 lb 4.8 oz (72.7 kg)   01/02/18 161 lb (73 kg)              Today, you had the following     No orders found for display         Today's Medication Changes          These changes are accurate as of 1/30/18  1:36 PM.  If you have any questions, ask your nurse or doctor.               These medicines have changed or have updated prescriptions.        Dose/Directions    cyanocobalamin 1000 MCG tablet   Commonly known as:  vitamin  B-12   This may have changed:  when to take this   Used for:  CKD (chronic kidney disease) stage 5, GFR less than 15 ml/min (H)        Dose:  1000 mcg   Take 1 tablet by mouth daily.   Quantity:  30 tablet   Refills:  0                Primary Care Provider Office Phone # Fax #    Horacio Sheridan [phone]       [address] [place] [address] [address] [place] [address]        Equal Access to Services     LINNEA HIDALGO AH: Hadpenny richter Somartín, waaxda luqadaha, qaybta kaalty ghotra. So [facility] [phone].    ATENCIÓN: Si habla español, tiene a goetz disposición servicios gratuitos de asistencia lingüística. Rachel al [phone].    We comply with applicable federal civil rights laws and [place] laws. We do not discriminate on the basis of race, color, national origin, age, disability, sex, sexual orientation, or gender identity.            Thank you!     Thank you for choosing [facility]  for your care. Our goal is always to provide you with excellent care. Hearing back " from our patients is one way we can continue to improve our services. Please take a few minutes to complete the written survey that you may receive in the mail after your visit with us. Thank you!             Your Updated Medication List - Protect others around you: Learn how to safely use, store and throw away your medicines at www.disposemymeds.org.          This list is accurate as of 1/30/18  1:36 PM.  Always use your most recent med list.                   Brand Name Dispense Instructions for use Diagnosis    acetylcysteine 600 MG Caps capsule    N-ACETYL CYSTEINE    30 capsule    Take 2 400 mg caps two times daily for total daily dose of 800 mg        albuterol 108 (90 BASE) MCG/ACT Inhaler    PROAIR HFA/PROVENTIL HFA/VENTOLIN HFA    1 Inhaler    Inhale 2 puffs into the lungs every 6 hours as needed for shortness of breath / dyspnea or wheezing    Exercise-induced asthma       ARIPiprazole 2 MG tablet    ABILIFY    15 tablet    Take 0.5 tablets (1 mg) by mouth daily    Major depressive disorder, recurrent episode, moderate (H)       aspirin 81 MG EC tablet     30 tablet    Take 1 tablet (81 mg) by mouth daily    Kidney replaced by transplant       BENADRYL 25 MG capsule   Generic drug:  diphenhydrAMINE      Take 25 mg by mouth At Bedtime.        blood glucose monitoring lancets     1 Box    Use to test blood sugar 2 times daily or as directed.    Type 2 diabetes mellitus with peripheral neuropathy (H)       * blood glucose monitoring meter device kit    no brand specified    1 kit    Use to test blood sugar 2 times daily or as directed.    Type 2 diabetes mellitus with peripheral neuropathy (H)       * blood glucose monitoring meter device kit           blood glucose monitoring test strip    no brand specified    100 strip    Use to test blood sugars 2 times daily or as directed    Type 2 diabetes mellitus with peripheral neuropathy (H)       cyanocobalamin 1000 MCG tablet    vitamin  B-12    30 tablet    Take  1 tablet by mouth daily.    CKD (chronic kidney disease) stage 5, GFR less than 15 ml/min (H)       cycloSPORINE modified 25 MG capsule    GENERIC EQUIVALENT    300 capsule    Take 5 capsules (125 mg) by mouth 2 times daily    Kidney replaced by transplant       econazole nitrate 1 % cream     85 g    Apply topically daily    Type II or unspecified type diabetes mellitus with neurological manifestations, not stated as uncontrolled(250.60) (H), Dermatophytosis of foot       furosemide 20 MG tablet    LASIX    90 tablet    Take 1 tablet (20 mg) by mouth daily    Generalized edema       latanoprost 0.005 % ophthalmic solution    XALATAN    2.5 mL    Place 1 drop into both eyes At Bedtime    Mild stage glaucoma(365.71)       metFORMIN 500 MG 24 hr tablet    GLUCOPHAGE-XR    90 tablet    Take 3 tablets (1,500 mg) by mouth daily (with dinner)    Type 2 diabetes mellitus with diabetic polyneuropathy, without long-term current use of insulin (H)       mycophenolate 250 MG capsule    GENERIC EQUIVALENT    240 capsule    Take 4 capsules (1,000 mg) by mouth 2 times daily    Kidney transplanted       omeprazole 40 MG capsule    priLOSEC    90 capsule    Take 1 capsule (40 mg) by mouth daily    Gastroesophageal reflux disease without esophagitis       ondansetron 4 MG ODT tab    ZOFRAN-ODT    20 tablet    Take 1 tablet (4 mg) by mouth every 6 hours as needed    Chronic kidney disease, stage V (H)       order for DME     1 Units    Walker with front wheels and a seat.    Fall, initial encounter       prazosin 5 MG capsule    MINIPRESS    90 capsule    Take 3 capsules (15 mg) by mouth At Bedtime    Nightmares associated with chronic post-traumatic stress disorder       REFRESH OP      Apply to eye as needed Both eyes        replens Gel     35 g    Use vaginally as needed. Can use up to 3 times per week.    Vaginal dryness       simvastatin 20 MG tablet    ZOCOR    90 tablet    Take 1 tablet (20 mg) by mouth At Bedtime    Chronic  kidney disease, stage V (H)       sulfamethoxazole-trimethoprim 400-80 MG per tablet    BACTRIM/SEPTRA    90 tablet    Take 1 tablet by mouth daily    Immunosuppression (H)       topiramate 200 MG tablet    TOPAMAX    60 tablet    Take 1 tablet (200 mg) by mouth 2 times daily    Morbid obesity due to excess calories (H)       TYLENOL 325 MG tablet   Generic drug:  acetaminophen      Take 325-650 mg by mouth as needed        vilazodone 40 MG Tabs tablet    VIIBRYD    30 tablet    Take 1 tablet (40 mg) by mouth daily    Major depressive disorder, recurrent episode, moderate (H)       vitamin D 1000 UNITS capsule     30 capsule    2,000 Units        * Notice:  This list has 2 medication(s) that are the same as other medications prescribed for you. Read the directions carefully, and ask your doctor or other care provider to review them with you.
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
## 2018-02-06 DIAGNOSIS — N18.5 CHRONIC KIDNEY DISEASE, STAGE V (H): ICD-10-CM

## 2018-02-06 RX ORDER — ONDANSETRON 4 MG/1
4 TABLET, ORALLY DISINTEGRATING ORAL EVERY 6 HOURS PRN
Qty: 20 TABLET | Refills: 3 | Status: SHIPPED | OUTPATIENT
Start: 2018-02-06 | End: 2019-12-06

## 2018-02-08 ASSESSMENT — ENCOUNTER SYMPTOMS
MYALGIAS: 0
JOINT SWELLING: 0
BACK PAIN: 0
ARTHRALGIAS: 0
MUSCLE WEAKNESS: 0
NECK PAIN: 0
MUSCLE CRAMPS: 0
STIFFNESS: 0

## 2018-02-12 ENCOUNTER — OFFICE VISIT (OUTPATIENT)
Dept: ENDOCRINOLOGY | Facility: CLINIC | Age: 63
End: 2018-02-12
Payer: MEDICARE

## 2018-02-12 VITALS
OXYGEN SATURATION: 100 % | BODY MASS INDEX: 30.09 KG/M2 | HEART RATE: 82 BPM | DIASTOLIC BLOOD PRESSURE: 70 MMHG | SYSTOLIC BLOOD PRESSURE: 115 MMHG | WEIGHT: 159.4 LBS | HEIGHT: 61 IN

## 2018-02-12 DIAGNOSIS — E66.811 CLASS 1 OBESITY DUE TO EXCESS CALORIES WITH SERIOUS COMORBIDITY AND BODY MASS INDEX (BMI) OF 30.0 TO 30.9 IN ADULT: Primary | ICD-10-CM

## 2018-02-12 DIAGNOSIS — E66.09 CLASS 1 OBESITY DUE TO EXCESS CALORIES WITH SERIOUS COMORBIDITY AND BODY MASS INDEX (BMI) OF 30.0 TO 30.9 IN ADULT: Primary | ICD-10-CM

## 2018-02-12 ASSESSMENT — PAIN SCALES - GENERAL: PAINLEVEL: NO PAIN (0)

## 2018-02-12 NOTE — PROGRESS NOTES
Return Medical Weight Management Note     Elizabeth Palma  MRN:  [id number]  :  1955  AYSE:  2018    Dear Dr. Sheridan,      I had the pleasure of seeing your patient Elizabeth Palma.  She is a 60 year old female who I am continuing to see for treatment of obesity related to: DM-2, Hyperlipidemia, Sleep Apnea (has CPAP and does not use), GERD and Depression.    CURRENT WEIGHT:   159 lbs 6.4 oz    Wt Readings from Last 4 Encounters:   18 72.3 kg (159 lb 6.4 oz)   18 72.6 kg (160 lb)   18 72.7 kg (160 lb 4.8 oz)   18 73 kg (161 lb)     Body Mass Index:  Body mass index is 30.12 kg/(m^2).  Vitals:  B/P: 119/79, P: 60    Initial consult weight was 214 on 2015.  Weight change since last seen on 2017 is down 4 pounds.   Total loss is 55 pounds.    INTERVAL HISTORY:  Topiramate 200 AM and 200 HS along with off gabapentin, and pain is ok. Fewer problems with evening and night eating.     Diet and Activity Changes Since Last Visit Reviewed With Patient 2017   I have made the following changes to my diet since my last visit: Eat at least two meals   With regards to my diet, I am still struggling with: Protein    For breakfast, I typically eat: Nothing    For lunch, I typically eat: 1/2 sandwich    For supper, I typically eat: Small amount of protein and a starch   For snack(s), I typically eat: Small amount of non low fat chips or veggies    I have made the following changes to my activity/exercise since my last visit: Walking more   With regards to my activity/exercise, I am still struggling with: Walking more     MEDICATIONS:   Current Outpatient Prescriptions   Medication     ondansetron (ZOFRAN-ODT) 4 MG ODT tab     mycophenolate (GENERIC EQUIVALENT) 250 MG capsule     sulfamethoxazole-trimethoprim (BACTRIM/SEPTRA) 400-80 MG per tablet     topiramate (TOPAMAX) 200 MG tablet     vilazodone (VIIBRYD) 40 MG TABS tablet     prazosin (MINIPRESS) 5 MG capsule      ARIPiprazole (ABILIFY) 2 MG tablet     metFORMIN (GLUCOPHAGE-XR) 500 MG 24 hr tablet     cycloSPORINE modified (GENERIC EQUIVALENT) 25 MG capsule     albuterol (PROAIR HFA/PROVENTIL HFA/VENTOLIN HFA) 108 (90 BASE) MCG/ACT Inhaler     order for DME     Vaginal Lubricant (REPLENS) GEL     furosemide (LASIX) 20 MG tablet     blood glucose monitoring (ACCU-CHEK RALPH PLUS) meter device kit     omeprazole (PRILOSEC) 40 MG capsule     simvastatin (ZOCOR) 20 MG tablet     Polyvinyl Alcohol-Povidone (REFRESH OP)     latanoprost (XALATAN) 0.005 % ophthalmic solution     blood glucose monitoring (NO BRAND SPECIFIED) meter device kit     blood glucose monitoring (NO BRAND SPECIFIED) test strip     blood glucose monitoring (SOFTCLIX) lancets     acetylcysteine (N-ACETYL-L-CYSTEINE) 600 MG CAPS capsule     Cholecalciferol (VITAMIN D) 1000 UNITS capsule     econazole nitrate 1 % cream     aspirin EC 81 MG EC tablet     acetaminophen (TYLENOL) 325 MG tablet     cyanocolbalamin (VITAMIN  B-12) 1000 MCG tablet     diphenhydrAMINE (BENADRYL) 25 MG capsule     No current facility-administered medications for this visit.        Weight Loss Medication History Reviewed With Patient 11/16/2017   Which weight loss medications are you currently taking on a regular basis?  Topamax (topiramate)   Are you having any side effects from the weight loss medication that we have prescribed you? No   If you are having side effects please describe: -     ASSESSMENT:   Good progress again. Maintain topiramate 200 morning and evening. Remains off gabapentin with acceptable neuropathy pain control.    FOLLOW-UP:    6 months.  10/15 minutes spent on counseling and education    Sincerely,    Prakash Irene MD

## 2018-02-12 NOTE — MR AVS SNAPSHOT
After Visit Summary   2/12/2018    Elizabeth Palma    MRN: [id number]           Patient Information     Date Of Birth          1955        Visit Information        Provider Department      2/12/2018 10:15 AM Prakash Irene MD [facility] Medical Weight Management        Today's Diagnoses     Class 1 obesity due to excess calories with serious comorbidity and body mass index (BMI) of 30.0 to 30.9 in adult    -  1       Follow-ups after your visit        Follow-up notes from your care team     Return in about 6 months (around 8/12/2018).      Your next 10 appointments already scheduled     Feb 16, 2018  9:00 AM CST   LAB with  LAB    Health Lab ([facility])    [address] [place] [address]  [address] [facility] [address] [phone]           Please do not eat 10-12 hours before your appointment if you are coming in fasting for labs on lipids, cholesterol, or glucose (sugar). This does not apply to pregnant women. Water, hot tea and black coffee (with nothing added) are okay. Do not drink other fluids, diet soda or chew gum.            Feb 16, 2018  9:30 AM CST   (Arrive by 9:15 AM)   NUTRITION VISIT with Leyla Guerrero RD   [facility] Surgical Weight Management ([facility])    [address] [place] [address] [phone]            Feb 19, 2018 12:30 PM CST   [facility]    [address] [place] [id number] [phone]            Feb 27, 2018  1:00 PM CST   Adult Med Follow UP with Chaparrita Hatch MD   [facility] ([facility])    [address]  [facility] [id number] [phone]            Mar 12, 2018 12:00 PM CDT   Lab with  LAB    Health Lab ([facility])    [address] [place] [address]    [phone]            Mar 12, 2018  1:10 PM CDT   (Arrive by 12:40 PM)   Return Kidney Transplant with Christian Jimenez MD   [facility] Nephrology ([facility])    [address]  [address]  [facility] [address]   [phone]            Apr 16, 2018 10:30 AM CDT   (Arrive by 10:15 AM)   Return Visit with Horacio Sheridan MD   [facility] ([facility])    [address] [place] [address]  [address] [facility] [address] [phone]            May 07, 2018  2:00 PM CDT   (Arrive by 1:45 PM)   RETURN ENDOCRINE with Lizbet Byers MD   [facility] Endocrinology ([facility])    [address] [place] [address]  [address] [facility] [address] [phone]            Jun 25, 2018 10:45 AM CDT   (Arrive by 10:30 AM)   Return Visit with Prakash Irene MD   [facility] Medical Weight Management ([facility])    [address] [place] [address]  [address] [facility] [address] [phone]              Who to contact     Please call your clinic at [phone] to:    Ask questions about your health    Make or cancel appointments    Discuss your medicines    Learn about your test results    Speak to your doctor            Additional Information About Your Visit        [employer] Information     [employer] gives you secure access to your electronic health record. If you see a primary care provider, you can also send messages to your care team and make appointments. If you have questions, please call your primary care clinic.  If you do not have a primary care provider, please call [phone] and they will assist you.      [employer] is an electronic gateway that provides easy, online access to your medical records. With [employer], you can request a clinic appointment, read your test results, renew a prescription or communicate with your care team.     To access your existing account, please contact your  "[facility] or call [phone] for assistance.        Care EveryWhere ID     This is your Care EveryWhere ID. This could be used by other organizations to access your [place] medical records  [id number]        Your Vitals Were     Pulse Height Pulse Oximetry BMI (Body Mass Index)          82 1.549 m (5' 1\") 100% 30.12 kg/m2         Blood Pressure from Last 3 Encounters:   02/12/18 115/70   01/30/18 132/79   01/08/18 92/61    Weight from Last 3 Encounters:   02/12/18 72.3 kg (159 lb 6.4 oz)   01/30/18 72.6 kg (160 lb)   01/19/18 72.7 kg (160 lb 4.8 oz)              Today, you had the following     No orders found for display         Today's Medication Changes          These changes are accurate as of 2/12/18 10:48 AM.  If you have any questions, ask your nurse or doctor.               These medicines have changed or have updated prescriptions.        Dose/Directions    cyanocobalamin 1000 MCG tablet   Commonly known as:  vitamin  B-12   This may have changed:  when to take this   Used for:  CKD (chronic kidney disease) stage 5, GFR less than 15 ml/min (H)        Dose:  1000 mcg   Take 1 tablet by mouth daily.   Quantity:  30 tablet   Refills:  0                Primary Care Provider Office Phone # Fax #    Horacio Sheridan [phone]       [address] [place] [address] [address] [place] [address]        Equal Access to Services     LINNEA HIDALGO AH: Hadii riky Quevedo, waaxda luernieadaha, qaybta kaalmada lor, ty fam. So [facility] [phone].    ATENCIÓN: Si habla español, tiene a goetz disposición servicios gratuitos de asistencia lingüística. Llame al [phone].    We comply with applicable federal civil rights laws and [place] laws. We do not discriminate on the basis of race, color, national origin, age, disability, sex, sexual orientation, or gender identity.            Thank you!     Thank you for choosing [facility] MEDICAL WEIGHT " MANAGEMENT  for your care. Our goal is always to provide you with excellent care. Hearing back from our patients is one way we can continue to improve our services. Please take a few minutes to complete the written survey that you may receive in the mail after your visit with us. Thank you!             Your Updated Medication List - Protect others around you: Learn how to safely use, store and throw away your medicines at www.disposemymeds.org.          This list is accurate as of 2/12/18 10:48 AM.  Always use your most recent med list.                   Brand Name Dispense Instructions for use Diagnosis    acetylcysteine 600 MG Caps capsule    N-ACETYL CYSTEINE    30 capsule    Take 2 400 mg caps two times daily for total daily dose of 800 mg        albuterol 108 (90 BASE) MCG/ACT Inhaler    PROAIR HFA/PROVENTIL HFA/VENTOLIN HFA    1 Inhaler    Inhale 2 puffs into the lungs every 6 hours as needed for shortness of breath / dyspnea or wheezing    Exercise-induced asthma       ARIPiprazole 2 MG tablet    ABILIFY    15 tablet    Take 0.5 tablets (1 mg) by mouth daily    Major depressive disorder, recurrent episode, moderate (H)       aspirin 81 MG EC tablet     30 tablet    Take 1 tablet (81 mg) by mouth daily    Kidney replaced by transplant       BENADRYL 25 MG capsule   Generic drug:  diphenhydrAMINE      Take 25 mg by mouth At Bedtime.        blood glucose monitoring lancets     1 Box    Use to test blood sugar 2 times daily or as directed.    Type 2 diabetes mellitus with peripheral neuropathy (H)       * blood glucose monitoring meter device kit    no brand specified    1 kit    Use to test blood sugar 2 times daily or as directed.    Type 2 diabetes mellitus with peripheral neuropathy (H)       * blood glucose monitoring meter device kit           blood glucose monitoring test strip    no brand specified    100 strip    Use to test blood sugars 2 times daily or as directed    Type 2 diabetes mellitus with  peripheral neuropathy (H)       cyanocobalamin 1000 MCG tablet    vitamin  B-12    30 tablet    Take 1 tablet by mouth daily.    CKD (chronic kidney disease) stage 5, GFR less than 15 ml/min (H)       cycloSPORINE modified 25 MG capsule    GENERIC EQUIVALENT    300 capsule    Take 5 capsules (125 mg) by mouth 2 times daily    Kidney replaced by transplant       econazole nitrate 1 % cream     85 g    Apply topically daily    Type II or unspecified type diabetes mellitus with neurological manifestations, not stated as uncontrolled(250.60) (H), Dermatophytosis of foot       furosemide 20 MG tablet    LASIX    90 tablet    Take 1 tablet (20 mg) by mouth daily    Generalized edema       latanoprost 0.005 % ophthalmic solution    XALATAN    2.5 mL    Place 1 drop into both eyes At Bedtime    Mild stage glaucoma(365.71)       metFORMIN 500 MG 24 hr tablet    GLUCOPHAGE-XR    90 tablet    Take 3 tablets (1,500 mg) by mouth daily (with dinner)    Type 2 diabetes mellitus with diabetic polyneuropathy, without long-term current use of insulin (H)       mycophenolate 250 MG capsule    GENERIC EQUIVALENT    240 capsule    Take 4 capsules (1,000 mg) by mouth 2 times daily    Kidney transplanted       omeprazole 40 MG capsule    priLOSEC    90 capsule    Take 1 capsule (40 mg) by mouth daily    Gastroesophageal reflux disease without esophagitis       ondansetron 4 MG ODT tab    ZOFRAN-ODT    20 tablet    Take 1 tablet (4 mg) by mouth every 6 hours as needed    Chronic kidney disease, stage V (H)       order for DME     1 Units    Walker with front wheels and a seat.    Fall, initial encounter       prazosin 5 MG capsule    MINIPRESS    90 capsule    Take 3 capsules (15 mg) by mouth At Bedtime    Nightmares associated with chronic post-traumatic stress disorder       REFRESH OP      Apply to eye as needed Both eyes        replens Gel     35 g    Use vaginally as needed. Can use up to 3 times per week.    Vaginal dryness        simvastatin 20 MG tablet    ZOCOR    90 tablet    Take 1 tablet (20 mg) by mouth At Bedtime    Chronic kidney disease, stage V (H)       sulfamethoxazole-trimethoprim 400-80 MG per tablet    BACTRIM/SEPTRA    90 tablet    Take 1 tablet by mouth daily    Immunosuppression (H)       topiramate 200 MG tablet    TOPAMAX    60 tablet    Take 1 tablet (200 mg) by mouth 2 times daily    Morbid obesity due to excess calories (H)       TYLENOL 325 MG tablet   Generic drug:  acetaminophen      Take 325-650 mg by mouth as needed        vilazodone 40 MG Tabs tablet    VIIBRYD    30 tablet    Take 1 tablet (40 mg) by mouth daily    Major depressive disorder, recurrent episode, moderate (H)       vitamin D 1000 UNITS capsule     30 capsule    2,000 Units        * Notice:  This list has 2 medication(s) that are the same as other medications prescribed for you. Read the directions carefully, and ask your doctor or other care provider to review them with you.

## 2018-02-12 NOTE — NURSING NOTE
"(   Chief Complaint   Patient presents with     Consult     f/u     )    ( Weight: 159 lb 6.4 oz )  ( Height: 5' 1\" )  ( BMI (Calculated): 30.18 )  (   )  (   )  (   )  (   )  (   )  (   )    ( BP: 115/70 )  (   )  (   )  (   )  ( Pulse: 82 )  (   )  ( SpO2: 100 % )    (   Patient Active Problem List   Diagnosis     Autosomal dominant polycystic kidney disease     Hypertension     Hyperlipidemia     Abnormal MRI, cervical spine     Sensory loss     Premature menopause age 37     OP (osteoporosis) T score -3.8     LION on CPAP     Major depressive disorder, recurrent episode, moderate (H)     Generalized anxiety disorder     CMC DJD(carpometacarpal degenerative joint disease), localized primary     Pain in joint, forearm -- L unhealed Fx     -donor kidney transplant     Immunosuppressed status (H)     Hyperparathyroidism, secondary (H)     Hyperparathyroidism (H)     Senile osteoporosis     Pain in joint involving ankle and foot     Nightmares associated with chronic post-traumatic stress disorder     Type 2 diabetes mellitus with peripheral neuropathy (H)     Posttraumatic stress disorder     Right knee pain     Left knee pain     Age-related osteoporosis without current pathological fracture     Narcissistic personality disorder    )  (   Current Outpatient Prescriptions   Medication Sig Dispense Refill     ondansetron (ZOFRAN-ODT) 4 MG ODT tab Take 1 tablet (4 mg) by mouth every 6 hours as needed 20 tablet 3     mycophenolate (GENERIC EQUIVALENT) 250 MG capsule Take 4 capsules (1,000 mg) by mouth 2 times daily 240 capsule 11     sulfamethoxazole-trimethoprim (BACTRIM/SEPTRA) 400-80 MG per tablet Take 1 tablet by mouth daily 90 tablet 3     topiramate (TOPAMAX) 200 MG tablet Take 1 tablet (200 mg) by mouth 2 times daily 60 tablet 5     vilazodone (VIIBRYD) 40 MG TABS tablet Take 1 tablet (40 mg) by mouth daily 30 tablet 3     prazosin (MINIPRESS) 5 MG capsule Take 3 capsules (15 mg) by mouth At Bedtime 90 " capsule 3     ARIPiprazole (ABILIFY) 2 MG tablet Take 0.5 tablets (1 mg) by mouth daily 15 tablet 3     metFORMIN (GLUCOPHAGE-XR) 500 MG 24 hr tablet Take 3 tablets (1,500 mg) by mouth daily (with dinner) 90 tablet 11     cycloSPORINE modified (GENERIC EQUIVALENT) 25 MG capsule Take 5 capsules (125 mg) by mouth 2 times daily 300 capsule 6     albuterol (PROAIR HFA/PROVENTIL HFA/VENTOLIN HFA) 108 (90 BASE) MCG/ACT Inhaler Inhale 2 puffs into the lungs every 6 hours as needed for shortness of breath / dyspnea or wheezing 1 Inhaler 11     order for DME Walker with front wheels and a seat. 1 Units 0     Vaginal Lubricant (REPLENS) GEL Use vaginally as needed. Can use up to 3 times per week. 35 g 11     furosemide (LASIX) 20 MG tablet Take 1 tablet (20 mg) by mouth daily 90 tablet 3     blood glucose monitoring (ACCU-CHEK RALPH PLUS) meter device kit   0     omeprazole (PRILOSEC) 40 MG capsule Take 1 capsule (40 mg) by mouth daily 90 capsule 3     simvastatin (ZOCOR) 20 MG tablet Take 1 tablet (20 mg) by mouth At Bedtime 90 tablet 3     Polyvinyl Alcohol-Povidone (REFRESH OP) Apply to eye as needed Both eyes       latanoprost (XALATAN) 0.005 % ophthalmic solution Place 1 drop into both eyes At Bedtime 2.5 mL 11     blood glucose monitoring (NO BRAND SPECIFIED) meter device kit Use to test blood sugar 2 times daily or as directed. 1 kit 0     blood glucose monitoring (NO BRAND SPECIFIED) test strip Use to test blood sugars 2 times daily or as directed 100 strip 11     blood glucose monitoring (SOFTCLIX) lancets Use to test blood sugar 2 times daily or as directed. 1 Box 11     acetylcysteine (N-ACETYL-L-CYSTEINE) 600 MG CAPS capsule Take 2 400 mg caps two times daily for total daily dose of 800 mg 30 capsule      Cholecalciferol (VITAMIN D) 1000 UNITS capsule 2,000 Units  30 capsule      econazole nitrate 1 % cream Apply topically daily 85 g 3     aspirin EC 81 MG EC tablet Take 1 tablet (81 mg) by mouth daily 30 tablet 5      acetaminophen (TYLENOL) 325 MG tablet Take 325-650 mg by mouth as needed       cyanocolbalamin (VITAMIN  B-12) 1000 MCG tablet Take 1 tablet by mouth daily. (Patient taking differently: Take 1,000 mcg by mouth every other day ) 30 tablet 0     diphenhydrAMINE (BENADRYL) 25 MG capsule Take 25 mg by mouth At Bedtime.      )  ( Diabetes Eval:    )    ( Pain Eval:  No Pain (0) )    ( Wound Eval:       )    (   History   Smoking Status     Former Smoker   Smokeless Tobacco     Never Used    )    ( Signed By:  Rocco Henderson; February 12, 2018; 10:35 AM )

## 2018-02-12 NOTE — LETTER
2018       RE: Elizabeth Palma  [address]   [facility] [address]     Dear Colleague,    Thank you for referring your patient, Elizabeth Palma, to the [facility] MEDICAL WEIGHT MANAGEMENT at [facility]. Please see a copy of my visit note below.        Return Medical Weight Management Note     Elizabeth Palma  MRN:  [id number]  :  1955  AYSE:  2018    Dear Dr. Sheridan,      I had the pleasure of seeing your patient Elizabeth Palma.  She is a 60 year old female who I am continuing to see for treatment of obesity related to: DM-2, Hyperlipidemia, Sleep Apnea (has CPAP and does not use), GERD and Depression.    CURRENT WEIGHT:   159 lbs 6.4 oz    Wt Readings from Last 4 Encounters:   18 72.3 kg (159 lb 6.4 oz)   18 72.6 kg (160 lb)   18 72.7 kg (160 lb 4.8 oz)   18 73 kg (161 lb)     Body Mass Index:  Body mass index is 30.12 kg/(m^2).  Vitals:  B/P: 119/79, P: 60    Initial consult weight was 214 on 2015.  Weight change since last seen on 2017 is down 4 pounds.   Total loss is 55 pounds.    INTERVAL HISTORY:  Topiramate 200 AM and 200 HS along with off gabapentin, and pain is ok. Fewer problems with evening and night eating.     Diet and Activity Changes Since Last Visit Reviewed With Patient 2017   I have made the following changes to my diet since my last visit: Eat at least two meals   With regards to my diet, I am still struggling with: Protein    For breakfast, I typically eat: Nothing    For lunch, I typically eat: 1/2 sandwich    For supper, I typically eat: Small amount of protein and a starch   For snack(s), I typically eat: Small amount of non low fat chips or veggies    I have made the following changes to my activity/exercise since my last visit: Walking more   With regards to my activity/exercise, I am still struggling with: Walking more     MEDICATIONS:   Current Outpatient Prescriptions    Medication     ondansetron (ZOFRAN-ODT) 4 MG ODT tab     mycophenolate (GENERIC EQUIVALENT) 250 MG capsule     sulfamethoxazole-trimethoprim (BACTRIM/SEPTRA) 400-80 MG per tablet     topiramate (TOPAMAX) 200 MG tablet     vilazodone (VIIBRYD) 40 MG TABS tablet     prazosin (MINIPRESS) 5 MG capsule     ARIPiprazole (ABILIFY) 2 MG tablet     metFORMIN (GLUCOPHAGE-XR) 500 MG 24 hr tablet     cycloSPORINE modified (GENERIC EQUIVALENT) 25 MG capsule     albuterol (PROAIR HFA/PROVENTIL HFA/VENTOLIN HFA) 108 (90 BASE) MCG/ACT Inhaler     order for DME     Vaginal Lubricant (REPLENS) GEL     furosemide (LASIX) 20 MG tablet     blood glucose monitoring (ACCU-CHEK RALPH PLUS) meter device kit     omeprazole (PRILOSEC) 40 MG capsule     simvastatin (ZOCOR) 20 MG tablet     Polyvinyl Alcohol-Povidone (REFRESH OP)     latanoprost (XALATAN) 0.005 % ophthalmic solution     blood glucose monitoring (NO BRAND SPECIFIED) meter device kit     blood glucose monitoring (NO BRAND SPECIFIED) test strip     blood glucose monitoring (SOFTCLIX) lancets     acetylcysteine (N-ACETYL-L-CYSTEINE) 600 MG CAPS capsule     Cholecalciferol (VITAMIN D) 1000 UNITS capsule     econazole nitrate 1 % cream     aspirin EC 81 MG EC tablet     acetaminophen (TYLENOL) 325 MG tablet     cyanocolbalamin (VITAMIN  B-12) 1000 MCG tablet     diphenhydrAMINE (BENADRYL) 25 MG capsule     No current facility-administered medications for this visit.        Weight Loss Medication History Reviewed With Patient 11/16/2017   Which weight loss medications are you currently taking on a regular basis?  Topamax (topiramate)   Are you having any side effects from the weight loss medication that we have prescribed you? No   If you are having side effects please describe: -     ASSESSMENT:   Good progress again. Maintain topiramate 200 morning and evening. Remains off gabapentin with acceptable neuropathy pain control.    FOLLOW-UP:    6 months.  10/15 minutes spent on  counseling and education    Sincerely,    Prakash Irene MD

## 2018-02-16 ENCOUNTER — ALLIED HEALTH/NURSE VISIT (OUTPATIENT)
Dept: SURGERY | Facility: CLINIC | Age: 63
End: 2018-02-16
Payer: MEDICARE

## 2018-02-16 VITALS — WEIGHT: 158.7 LBS | BODY MASS INDEX: 29.99 KG/M2

## 2018-02-16 DIAGNOSIS — Z79.899 ENCOUNTER FOR LONG-TERM CURRENT USE OF MEDICATION: ICD-10-CM

## 2018-02-16 DIAGNOSIS — Z48.298 AFTERCARE FOLLOWING ORGAN TRANSPLANT: ICD-10-CM

## 2018-02-16 DIAGNOSIS — Z94.0 KIDNEY REPLACED BY TRANSPLANT: ICD-10-CM

## 2018-02-16 LAB
CYCLOSPORINE BLD LC/MS/MS-MCNC: 95 UG/L (ref 50–400)
TME LAST DOSE: NORMAL H

## 2018-02-16 NOTE — MR AVS SNAPSHOT
MRN:[id number]                      After Visit Summary   2/16/2018    Elizabeth Palma    MRN: [id number]           Visit Information        Provider Department      2/16/2018 9:30 AM Leyla Guerrero RD M [facility] Surgical Weight Management        Your next 10 appointments already scheduled     Feb 19, 2018 12:30 PM CST   Return Visit with Javier Tuttle DPM   [facility] ([facility]    [address] [place] [address]   [phone]            Feb 27, 2018  1:00 PM CST   Adult Med Follow UP with Chaparrita Hatch MD   [facility] Psych[facility]ry ([facility])    [address]  [facility] [address]   [phone]            Mar 12, 2018 12:00 PM CDT   Lab with  LAB   [facility] ([facility])    [address]  1st Floor  [facility] [id number] [phone]            Mar 12, 2018  1:10 PM CDT   (Arrive by 12:40 PM)   Return Kidney Transplant with Christian Jimenez MD   [facility] Nephrology ([facility])    [address]  [address]  [facility] [id number] [phone]            Mar 16, 2018  9:00 AM CDT   LAB with  LAB   [facility] ([facility])    [address]  1st Floor  [facility] [address]   [phone]           Please do not eat 10-12 hours before your appointment if you are coming in fasting for labs on lipids, cholesterol, or glucose (sugar). This does not apply to pregnant women. Water, hot tea and black coffee (with nothing added) are okay. Do not drink other fluids, diet soda or chew gum.            Mar 16, 2018  9:30 AM CDT   (Arrive by 9:15 AM)   NUTRITION VISIT with SUJATA Logan [facility] Weight Management ([facility])    [address]  4th Floor  [facility] [address]   [phone]            Apr 16, 2018 10:30 AM CDT   (Arrive by 10:15  AM)   Return Visit with Horacio Sheridan MD   [facility] ([facility])    [address]  4th [facility] [address] [phone]            May 07, 2018  2:00 PM CDT   (Arrive by 1:45 PM)   RETURN ENDOCRINE with Lizbet Byers MD   [facility] Endocrinology ([facility])    [address]  [address]rd [facility] [address] [phone]            Jul 02, 2018 10:45 AM CDT   (Arrive by 10:30 AM)   Return Visit with Prakash Irene MD   [facility] Medical Weight Management ([facility])    [address]  [address] [facility] [id number] [phone]              [employer] Information     [employer] gives you secure access to your electronic health record. If you see a primary care provider, you can also send messages to your care team and make appointments. If you have questions, please call your primary care clinic.  If you do not have a primary care provider, please call [phone] and they will assist you.      [employer] is an electronic gateway that provides easy, online access to your medical records. With [employer], you can request a clinic appointment, read your test results, renew a prescription or communicate with your care team.     To access your existing account, please contact your [facility] or call [phone] for assistance.        Care EveryWhere ID     This is your Care EveryWhere ID. This could be used by other organizations to access your [place] medical records  [id number]        Equal Access to Services     LINNEA HIDALGO : Hadii riky richter Somartín, waaxda luqadaha, qaybta kaalmada ty horowitz. So [facility] [phone].    ATENCIÓN: Si habla español, tiene a goetz disposición servicios gratuitos de asistencia lingüística. Llame al [phone].    We comply with applicable federal civil  rights laws and [place] laws. We do not discriminate on the basis of race, color, national origin, age, disability, sex, sexual orientation, or gender identity.

## 2018-02-16 NOTE — PROGRESS NOTES
"Nutrition Reassessment  Reason For Visit:  Elizabeth Palma is a 62 year-old female with type 2 DM (oral med management) presenting today for nutrition follow-up, s/p \"gastric bypass in 1990 at [facility]\" per Pt report.  She is seeing medical weight management.  She was referred by Dr. Irene.  Note: Pt had a kidney transplant on March 20, 2014.    Pt was accompanied by her .     Anthropometrics:  Height: 61\"  Pre-op Weight: 265 lbs per Pt report; lowest weight after bariatric surgery: 165-170 lbs (25 years ago)  (Pt reported that she initially was at 215 lbs in 2015 prior to seeing writer.)    Current Weight: 158.7 lbs (-1.6 lbs over the past month) with BMI of 29.99.    Current Vitamins/Minerals: 3000 International Units vitamin D/day, Calcium, vitamin C, vitamin B12 daily, B-complex  *Pt stated that she was told not to take other vitamins/minerals by MD.    Nutrition History:  Lactose intolerance ever since bariatric surgery.  Pt stated that she has osteoporosis; however, she stated that her doctors don't want her to take any vitamins or minerals due to her kidney transplant.    Diet/Nutrition Intake Hx: Pt reports her intake varies due to fluctuating appetite. Per pt there are days when she eats 3 meals but on other days she may not eat much at all or nibble on something through out the day. Pt also states her intake is affected by nausea 2/2 her anti-rejection medications. However pt reports she tries to make healthy choices (lower in calorie). Pt is also limited in protein choices that she likes - pt reports she does not take much dairy, does not like beans and lentils, keeps kosher. Advised pt to try to time her meals and eat every 4 hours instead of grazing but pt refused stating she would rather not eat at all.     *Pt stated that she will not record food intake due to the fact that every time she does this, she simply does not eat as she doesn't want to record.  She stated that her therapist " strongly advises against recording food intake for this reason.    Physical Activity Note: Pt reports she has a tendency to break bones so she is limited with what exercises she does. Pt states there is a long hallway in the building that she needs to walk when going out. Pt states she does not walk for exercises but walks only to get ADLs done.      Progress with Previous Goals:    1. Avoid grazing through, Eat 3 meals with lean protein at each meal, along with a non-starchy vegetable or whole fruit, up to 1/2 c carb at a meal. Occasional snacks eating lunch and dinner.  Found seasoned fish at [employer]    -Try hard-boiled eggs to put on your salad or to have later in the day to make up for skipping breakfast.    2. If needing a snack, have vegetables (give at least 2 hours between a meals and a snack). Fruit cup in the middle of the night, few nuts, or small amount of chips and salsa  3. Walk 5-10 min daily, increasing as able. Walking more and more without knowing it    Nutrition Prescription:  Grams Protein: 60 (minimum)  Amount of Fluid: 48-64 oz    Nutrition Diagnosis  Previous: Obesity related to excessive energy intake as evidenced by BMI > 30.- Resolved     Current: Overweight/Obesity related to excessive energy intake as evidenced by BMI > 25.      Intervention  Intervention At Appointment:  Materials/Education provided:  Praised Pt for weight loss, patient reported that she is very committed to continuing to loose weight and that she has made lifestyle changes.  Patient reported that she is waking up hungry in the middle of the night, discussed healthy options to have ready prepared to eat at night if waking up hungry.  She noted that she does not eat much in the morning due to having to take medications and not feeling hungry after taking so many mediations.  Patient stated that she is not going out specifically to exercise but has noticed she is waking more due to feeling better, encouraged continued  exercise.  Provided patient 100 calorie snack handout for ideas if waking up at night, gave encouragement and support..       Patient Understanding: good  Expected Compliance: good with continued RD follow up.    Goals:   1. Avoid grazing through, Eat 3 meals with lean protein at each meal, along with a non-starchy vegetable or whole fruit, up to 1/2 c carb at a meal.   -Try hard-boiled eggs to put on your salad or to have later in the day to make up for skipping breakfast.    2. If needing a snack, have vegetables (give at least 2 hours between a meals and a snack).  3. Walk 5-10 min daily, increasing as able.  4. Check vitamin and mineral labs at next doctor appointment due to history of bariatric surgery (vitamin A, Vitamin D, zinc, iron, B12, B1, calcium, PTH).    Follow-Up: 1 month    Time spent with patient: 30 minutes.  Leyla Guerrero, RD, LD

## 2018-02-27 ENCOUNTER — OFFICE VISIT (OUTPATIENT)
Dept: PSYCHIATRY | Facility: CLINIC | Age: 63
End: 2018-02-27
Payer: MEDICARE

## 2018-02-27 VITALS
DIASTOLIC BLOOD PRESSURE: 82 MMHG | RESPIRATION RATE: 16 BRPM | HEART RATE: 69 BPM | SYSTOLIC BLOOD PRESSURE: 125 MMHG | TEMPERATURE: 97.7 F

## 2018-02-27 DIAGNOSIS — F51.5 NIGHTMARES ASSOCIATED WITH CHRONIC POST-TRAUMATIC STRESS DISORDER: ICD-10-CM

## 2018-02-27 DIAGNOSIS — F33.1 MAJOR DEPRESSIVE DISORDER, RECURRENT EPISODE, MODERATE (H): ICD-10-CM

## 2018-02-27 DIAGNOSIS — H40.9 MILD STAGE GLAUCOMA(365.71): ICD-10-CM

## 2018-02-27 DIAGNOSIS — F43.12 NIGHTMARES ASSOCIATED WITH CHRONIC POST-TRAUMATIC STRESS DISORDER: ICD-10-CM

## 2018-02-27 RX ORDER — PRAZOSIN HYDROCHLORIDE 5 MG/1
15 CAPSULE ORAL AT BEDTIME
Qty: 90 CAPSULE | Refills: 3 | Status: SHIPPED | OUTPATIENT
Start: 2018-02-27 | End: 2018-07-10

## 2018-02-27 RX ORDER — ARIPIPRAZOLE 2 MG/1
1 TABLET ORAL DAILY
Qty: 15 TABLET | Refills: 3 | Status: SHIPPED | OUTPATIENT
Start: 2018-02-27 | End: 2018-07-10

## 2018-02-27 RX ORDER — LATANOPROST 50 UG/ML
1 SOLUTION/ DROPS OPHTHALMIC AT BEDTIME
Qty: 2.5 ML | Refills: 2 | Status: SHIPPED | OUTPATIENT
Start: 2018-02-27 | End: 2018-05-10

## 2018-02-27 RX ORDER — VILAZODONE HYDROCHLORIDE 40 MG/1
40 TABLET ORAL DAILY
Qty: 30 TABLET | Refills: 3 | Status: SHIPPED | OUTPATIENT
Start: 2018-02-27 | End: 2018-07-06

## 2018-02-27 ASSESSMENT — PAIN SCALES - GENERAL: PAINLEVEL: SEVERE PAIN (7)
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
## 2018-02-27 NOTE — MR AVS SNAPSHOT
After Visit Summary   2/27/2018    Elizabeth Palma    MRN: [id number]           Patient Information     Date Of Birth          1955        Visit Information        Provider Department      2/27/2018 1:00 PM Chaparrita Hatch MD [facility]        Today's Diagnoses     Major depressive disorder, recurrent episode, moderate (H)        Nightmares associated with chronic post-traumatic stress disorder           Follow-ups after your visit        Follow-up notes from your care team     Return in about 4 weeks (around 3/27/2018) for 30 min. MFU.      Your next 10 appointments already scheduled     Mar 12, 2018 12:00 PM CDT   Lab with  LAB   [facility] ([facility])    [address]  1st Floor  [facility] [address]   [phone]            Mar 12, 2018  1:10 PM CDT   (Arrive by 12:40 PM)   Return Kidney Transplant with Christian Jimenez MD   [facility] ([facility])    [address] [place] [address]  [address]  [facility] [address]   [phone]            Mar 16, 2018  9:00 AM CDT   LAB with  LAB   [facility] ([facility])    [address] [place] [address]  1st Floor  [facility] [id number] [phone]           Please do not eat 10-12 hours before your appointment if you are coming in fasting for labs on lipids, cholesterol, or glucose (sugar). This does not apply to pregnant women. Water, hot tea and black coffee (with nothing added) are okay. Do not drink other fluids, diet soda or chew gum.            Mar 16, 2018  9:30 AM CDT   (Arrive by 9:15 AM)   NUTRITION VISIT with Leyla Guerrero RD   [facility] ([facility])    [address]  4th Floor  [facility] [address]   [phone]            Mar 19, 2018  2:30 PM CDT   Return Visit with Javier Tuttle DPM   [facility]    [address]  [facility] [id number] [phone]            Mar 27, 2018  1:00 PM CDT   Adult Med Follow UP with Chaparrita Hatch MD   [facility] ([facility])    [address]  [facility] [address]   [phone]            Apr 16, 2018 10:30 AM CDT   (Arrive by 10:15 AM)   Return Visit with Horacio Sheridan MD   [facility] ([facility])    [address] [place] [address]  4th [facility] [id number] [phone]            May 07, 2018  2:00 PM CDT   (Arrive by 1:45 PM)   RETURN ENDOCRINE with Lizbet Byers MD   [facility] ([facility])    [address] [place] [address]  3rd [facility] [id number] [phone]            Jul 02, 2018 10:45 AM CDT   (Arrive by 10:30 AM)   Return Visit with Prakash Irene MD   [facility] ([facility])    [address] [place] [id number] [phone]              Who to contact     Please call your clinic at [phone] to:    Ask questions about your health    Make or cancel appointments    Discuss your medicines    Learn about your test results    Speak to your doctor            Additional Information About Your Visit        [employer] direct marketing concepts Information     [employer] gives you secure access to your electronic health record. If you see a primary care provider, you can also send messages to your care team and make appointments. If you have questions, please call your primary care clinic.  If you do not have a primary care provider, please call [phone] and they will assist you.      [employer] is an electronic gateway that provides easy, online access to your medical records. With [employer], you can request a clinic appointment, read your test results, renew a prescription or communicate with your care team.     To access your existing account, please contact your  [facility] or call [phone] for assistance.        Care EveryWhere ID     This is your Care EveryWhere ID. This could be used by other organizations to access your [place] medical records  [id number]        Your Vitals Were     Pulse Temperature Respirations             69 97.7  F (36.5  C) 16          Blood Pressure from Last 3 Encounters:   02/27/18 125/82   02/12/18 115/70   01/30/18 132/79    Weight from Last 3 Encounters:   02/16/18 158 lb 11.2 oz (72 kg)   02/12/18 159 lb 6.4 oz (72.3 kg)   01/30/18 160 lb (72.6 kg)              Today, you had the following     No orders found for display         Today's Medication Changes          These changes are accurate as of 2/27/18  1:41 PM.  If you have any questions, ask your nurse or doctor.               These medicines have changed or have updated prescriptions.        Dose/Directions    cyanocobalamin 1000 MCG tablet   Commonly known as:  vitamin  B-12   This may have changed:  when to take this   Used for:  CKD (chronic kidney disease) stage 5, GFR less than 15 ml/min (H)        Dose:  1000 mcg   Take 1 tablet by mouth daily.   Quantity:  30 tablet   Refills:  0            Where to get your medicines      These medications were sent to [id number] - [place] - [address] [facility] [address]  [address], [facility] [address]     Phone:  [phone]     ARIPiprazole 2 MG tablet    latanoprost 0.005 % ophthalmic solution    prazosin 5 MG capsule    vilazodone 40 MG Tabs tablet                Primary Care Provider Office Phone # Fax #    Horacio Sheridan [phone]       [address] [place] [address]  [facility] [address]        Equal Access to Services     LINNEA HIDALGO AH: Hadii riky Quevedo, waaxda luqadaha, qaybta kaalmada adeegyada, ty fam. So [facility] [phone].    ATENCIÓN: Si habla español, tiene a goetz disposición servicios gratuitos de  gutierreza lingüística. Rachel al [phone].    We comply with applicable federal civil rights laws and [place] laws. We do not discriminate on the basis of race, color, national origin, age, disability, sex, sexual orientation, or gender identity.            Thank you!     Thank you for choosing [facility]  for your care. Our goal is always to provide you with excellent care. Hearing back from our patients is one way we can continue to improve our services. Please take a few minutes to complete the written survey that you may receive in the mail after your visit with us. Thank you!             Your Updated Medication List - Protect others around you: Learn how to safely use, store and throw away your medicines at www.disposemymeds.org.          This list is accurate as of 2/27/18  1:41 PM.  Always use your most recent med list.                   Brand Name Dispense Instructions for use Diagnosis    acetylcysteine 600 MG Caps capsule    N-ACETYL CYSTEINE    30 capsule    Take 2 400 mg caps two times daily for total daily dose of 800 mg        albuterol 108 (90 BASE) MCG/ACT Inhaler    PROAIR HFA/PROVENTIL HFA/VENTOLIN HFA    1 Inhaler    Inhale 2 puffs into the lungs every 6 hours as needed for shortness of breath / dyspnea or wheezing    Exercise-induced asthma       ARIPiprazole 2 MG tablet    ABILIFY    15 tablet    Take 0.5 tablets (1 mg) by mouth daily    Major depressive disorder, recurrent episode, moderate (H)       aspirin 81 MG EC tablet     30 tablet    Take 1 tablet (81 mg) by mouth daily    Kidney replaced by transplant       BENADRYL 25 MG capsule   Generic drug:  diphenhydrAMINE      Take 25 mg by mouth At Bedtime.        blood glucose monitoring lancets     1 Box    Use to test blood sugar 2 times daily or as directed.    Type 2 diabetes mellitus with peripheral neuropathy (H)       * blood glucose monitoring meter device kit    no brand specified    1 kit    Use to test blood sugar 2 times  daily or as directed.    Type 2 diabetes mellitus with peripheral neuropathy (H)       * blood glucose monitoring meter device kit           blood glucose monitoring test strip    no brand specified    100 strip    Use to test blood sugars 2 times daily or as directed    Type 2 diabetes mellitus with peripheral neuropathy (H)       cyanocobalamin 1000 MCG tablet    vitamin  B-12    30 tablet    Take 1 tablet by mouth daily.    CKD (chronic kidney disease) stage 5, GFR less than 15 ml/min (H)       cycloSPORINE modified 25 MG capsule    GENERIC EQUIVALENT    300 capsule    Take 5 capsules (125 mg) by mouth 2 times daily    Kidney replaced by transplant       econazole nitrate 1 % cream     85 g    Apply topically daily    Type II or unspecified type diabetes mellitus with neurological manifestations, not stated as uncontrolled(250.60) (H), Dermatophytosis of foot       furosemide 20 MG tablet    LASIX    90 tablet    Take 1 tablet (20 mg) by mouth daily    Generalized edema       latanoprost 0.005 % ophthalmic solution    XALATAN    2.5 mL    Place 1 drop into both eyes At Bedtime    Mild stage glaucoma(365.71)       metFORMIN 500 MG 24 hr tablet    GLUCOPHAGE-XR    90 tablet    Take 3 tablets (1,500 mg) by mouth daily (with dinner)    Type 2 diabetes mellitus with diabetic polyneuropathy, without long-term current use of insulin (H)       mycophenolate 250 MG capsule    GENERIC EQUIVALENT    240 capsule    Take 4 capsules (1,000 mg) by mouth 2 times daily    Kidney transplanted       omeprazole 40 MG capsule    priLOSEC    90 capsule    Take 1 capsule (40 mg) by mouth daily    Gastroesophageal reflux disease without esophagitis       ondansetron 4 MG ODT tab    ZOFRAN-ODT    20 tablet    Take 1 tablet (4 mg) by mouth every 6 hours as needed    Chronic kidney disease, stage V (H)       order for DME     1 Units    Walker with front wheels and a seat.    Fall, initial encounter       prazosin 5 MG capsule    MINIPRESS     90 capsule    Take 3 capsules (15 mg) by mouth At Bedtime    Nightmares associated with chronic post-traumatic stress disorder       REFRESH OP      Apply to eye as needed Both eyes        replens Gel     35 g    Use vaginally as needed. Can use up to 3 times per week.    Vaginal dryness       simvastatin 20 MG tablet    ZOCOR    90 tablet    Take 1 tablet (20 mg) by mouth At Bedtime    Chronic kidney disease, stage V (H)       sulfamethoxazole-trimethoprim 400-80 MG per tablet    BACTRIM/SEPTRA    90 tablet    Take 1 tablet by mouth daily    Immunosuppression (H)       topiramate 200 MG tablet    TOPAMAX    60 tablet    Take 1 tablet (200 mg) by mouth 2 times daily    Morbid obesity due to excess calories (H)       TYLENOL 325 MG tablet   Generic drug:  acetaminophen      Take 325-650 mg by mouth as needed        vilazodone 40 MG Tabs tablet    VIIBRYD    30 tablet    Take 1 tablet (40 mg) by mouth daily    Major depressive disorder, recurrent episode, moderate (H)       vitamin D 1000 UNITS capsule     30 capsule    2,000 Units        * Notice:  This list has 2 medication(s) that are the same as other medications prescribed for you. Read the directions carefully, and ask your doctor or other care provider to review them with you.

## 2018-02-27 NOTE — TELEPHONE ENCOUNTER
Medication:   latanoprost (XALATAN) 0.005 % ophthalmic solution  Last Written Prescription Date:  4/18/17  Last Fill Quantity: 2.5ml,   # refills: 11    Last Office Visit  4/18/17  :Future Office visit:none    Attending Provider: Yonas Underwood    Using latanoprost at bedtime both eyes with good compliance

## 2018-02-28 ASSESSMENT — PATIENT HEALTH QUESTIONNAIRE - PHQ9: SUM OF ALL RESPONSES TO PHQ QUESTIONS 1-9: 2

## 2018-03-07 ENCOUNTER — TELEPHONE (OUTPATIENT)
Dept: NEPHROLOGY | Facility: CLINIC | Age: 63
End: 2018-03-07

## 2018-03-07 NOTE — TELEPHONE ENCOUNTER
Spoke with patient for transplant follow up. States she's doing very well, denied any symptoms or concerns prior to appointment.    Shaunna Duncan RN

## 2018-03-12 ENCOUNTER — RESULTS ONLY (OUTPATIENT)
Dept: OTHER | Facility: CLINIC | Age: 63
End: 2018-03-12

## 2018-03-12 ENCOUNTER — OFFICE VISIT (OUTPATIENT)
Dept: DERMATOLOGY | Facility: CLINIC | Age: 63
End: 2018-03-12
Payer: MEDICARE

## 2018-03-12 ENCOUNTER — OFFICE VISIT (OUTPATIENT)
Dept: NEPHROLOGY | Facility: CLINIC | Age: 63
End: 2018-03-12
Attending: INTERNAL MEDICINE
Payer: MEDICARE

## 2018-03-12 VITALS
TEMPERATURE: 99.3 F | HEART RATE: 70 BPM | WEIGHT: 155.8 LBS | SYSTOLIC BLOOD PRESSURE: 124 MMHG | DIASTOLIC BLOOD PRESSURE: 76 MMHG | HEIGHT: 61 IN | OXYGEN SATURATION: 98 % | BODY MASS INDEX: 29.42 KG/M2

## 2018-03-12 VITALS — HEART RATE: 80 BPM | SYSTOLIC BLOOD PRESSURE: 122 MMHG | DIASTOLIC BLOOD PRESSURE: 78 MMHG

## 2018-03-12 DIAGNOSIS — Q61.2 AUTOSOMAL DOMINANT POLYCYSTIC KIDNEY DISEASE: ICD-10-CM

## 2018-03-12 DIAGNOSIS — Z94.0 DECEASED-DONOR KIDNEY TRANSPLANT: ICD-10-CM

## 2018-03-12 DIAGNOSIS — L82.1 SK (SEBORRHEIC KERATOSIS): Primary | ICD-10-CM

## 2018-03-12 DIAGNOSIS — Z48.298 AFTERCARE FOLLOWING ORGAN TRANSPLANT: Primary | ICD-10-CM

## 2018-03-12 DIAGNOSIS — D84.9 IMMUNOSUPPRESSION (H): ICD-10-CM

## 2018-03-12 DIAGNOSIS — E11.42 TYPE 2 DIABETES MELLITUS WITH PERIPHERAL NEUROPATHY (H): ICD-10-CM

## 2018-03-12 DIAGNOSIS — Z48.298 AFTERCARE FOLLOWING ORGAN TRANSPLANT: ICD-10-CM

## 2018-03-12 DIAGNOSIS — E66.01 MORBID OBESITY, UNSPECIFIED OBESITY TYPE (H): ICD-10-CM

## 2018-03-12 DIAGNOSIS — D22.9 MULTIPLE NEVI: ICD-10-CM

## 2018-03-12 DIAGNOSIS — Z79.899 ENCOUNTER FOR LONG-TERM CURRENT USE OF MEDICATION: ICD-10-CM

## 2018-03-12 DIAGNOSIS — Z94.0 KIDNEY REPLACED BY TRANSPLANT: ICD-10-CM

## 2018-03-12 DIAGNOSIS — M80.00XS OSTEOPOROSIS WITH CURRENT PATHOLOGICAL FRACTURE, UNSPECIFIED OSTEOPOROSIS TYPE, SEQUELA: ICD-10-CM

## 2018-03-12 DIAGNOSIS — I15.1 HYPERTENSION SECONDARY TO OTHER RENAL DISORDERS: ICD-10-CM

## 2018-03-12 DIAGNOSIS — Z79.2 NEED FOR PROPHYLACTIC ANTIBIOTIC: ICD-10-CM

## 2018-03-12 DIAGNOSIS — D84.9 IMMUNOSUPPRESSED STATUS (H): ICD-10-CM

## 2018-03-12 DIAGNOSIS — Z00.00 HEALTH CARE MAINTENANCE: ICD-10-CM

## 2018-03-12 LAB
ANION GAP SERPL CALCULATED.3IONS-SCNC: 12 MMOL/L (ref 3–14)
BUN SERPL-MCNC: 14 MG/DL (ref 7–30)
CALCIUM SERPL-MCNC: 9.3 MG/DL (ref 8.5–10.1)
CHLORIDE SERPL-SCNC: 105 MMOL/L (ref 94–109)
CO2 SERPL-SCNC: 22 MMOL/L (ref 20–32)
CREAT SERPL-MCNC: 0.9 MG/DL (ref 0.52–1.04)
CREAT UR-MCNC: 32 MG/DL
ERYTHROCYTE [DISTWIDTH] IN BLOOD BY AUTOMATED COUNT: 14.5 % (ref 10–15)
GFR SERPL CREATININE-BSD FRML MDRD: 63 ML/MIN/1.7M2
GLUCOSE SERPL-MCNC: 134 MG/DL (ref 70–99)
HCT VFR BLD AUTO: 44.9 % (ref 35–47)
HGB BLD-MCNC: 13.8 G/DL (ref 11.7–15.7)
MCH RBC QN AUTO: 26.7 PG (ref 26.5–33)
MCHC RBC AUTO-ENTMCNC: 30.7 G/DL (ref 31.5–36.5)
MCV RBC AUTO: 87 FL (ref 78–100)
PLATELET # BLD AUTO: 191 10E9/L (ref 150–450)
POTASSIUM SERPL-SCNC: 3.7 MMOL/L (ref 3.4–5.3)
PROT UR-MCNC: 0.06 G/L
PROT/CREAT 24H UR: 0.18 G/G CR (ref 0–0.2)
RBC # BLD AUTO: 5.17 10E12/L (ref 3.8–5.2)
SODIUM SERPL-SCNC: 139 MMOL/L (ref 133–144)
WBC # BLD AUTO: 4 10E9/L (ref 4–11)

## 2018-03-12 PROCEDURE — 87799 DETECT AGENT NOS DNA QUANT: CPT | Performed by: INTERNAL MEDICINE

## 2018-03-12 PROCEDURE — 86833 HLA CLASS II HIGH DEFIN QUAL: CPT | Performed by: PSYCHIATRY & NEUROLOGY

## 2018-03-12 PROCEDURE — G0463 HOSPITAL OUTPT CLINIC VISIT: HCPCS | Mod: ZF

## 2018-03-12 PROCEDURE — 80048 BASIC METABOLIC PNL TOTAL CA: CPT | Performed by: INTERNAL MEDICINE

## 2018-03-12 PROCEDURE — 85027 COMPLETE CBC AUTOMATED: CPT | Performed by: INTERNAL MEDICINE

## 2018-03-12 PROCEDURE — 36415 COLL VENOUS BLD VENIPUNCTURE: CPT | Performed by: INTERNAL MEDICINE

## 2018-03-12 PROCEDURE — 84156 ASSAY OF PROTEIN URINE: CPT | Performed by: INTERNAL MEDICINE

## 2018-03-12 PROCEDURE — 86832 HLA CLASS I HIGH DEFIN QUAL: CPT | Performed by: PSYCHIATRY & NEUROLOGY

## 2018-03-12 ASSESSMENT — PAIN SCALES - GENERAL
PAINLEVEL: NO PAIN (0)
PAINLEVEL: SEVERE PAIN (7)

## 2018-03-12 NOTE — LETTER
3/12/2018      RE: Elizabeth Palma  [address]   [facility] [address]       [facility] D[facility]ology Note      Dermatology Problem List:  1. SKs  2. Benign nevi  3. xerosis  4. Renal transplant 3/20/14     CC:   Chief Complaint   Patient presents with     Skin Check     Elizabeth is here for TXP skin check         Encounter Date: Mar 12, 2018    History of Present Illness:  Ms. Elizabeth Palma is a 62 year old female who with no history of skin cancer presents for her FBSE.  She had a renal transplant in .  She notes no lesions of concern.  She did have Dr Sheridan look at a lesion near her c sxn scar and was told it was an age related lesion without concern.  She reports her  is in the hospital with pneumonia but otherwise she is doing well.    Past Medical History:   Patient Active Problem List   Diagnosis     Autosomal dominant polycystic kidney disease     Hypertension     Hyperlipidemia     Abnormal MRI, cervical spine     Sensory loss     Premature menopause age 37     OP (osteoporosis) T score -3.8     LION on CPAP     Major depressive disorder, recurrent episode, moderate (H)     Generalized anxiety disorder     CMC DJD(carpometacarpal degenerative joint disease), localized primary     Pain in joint, forearm -- L unhealed Fx     -donor kidney transplant     Immunosuppressed status (H)     Hyperparathyroidism, secondary (H)     Hyperparathyroidism (H)     Senile osteoporosis     Pain in joint involving ankle and foot     Nightmares associated with chronic post-traumatic stress disorder     Type 2 diabetes mellitus with peripheral neuropathy (H)     Posttraumatic stress disorder     Right knee pain     Left knee pain     Age-related osteoporosis without current pathological fracture     Narcissistic personality disorder     Past Medical History:   Diagnosis Date     Abnormal MRI, cervical spine 10/15/2011    2011; mild changes noted. Study done for left arm  symptoms Impression:  1. Mild multilevel degenerative disc disease with no significant canal or neural stenosis seen. motion artifact on the STIR images in these are not interpretable. The remaining images were interpreted      Autosomal dominant polycystic kidney disease 2011     (Problem list name updated by automated process. Provider to review and confirm.)     CMC DJD(carpometacarpal degenerative joint disease), localized primary 3/5/2013     -donor kidney transplant 3/20/2014     Depressive disorder 11/15/2012     DM type 2 (diabetes mellitus, type 2) (H) 2013     Encounter for long-term (current) use of other medications 2015     Family history of tremor 10/17/2011     Generalized anxiety disorder 11/15/2012     Glaucoma      Hyperlipidemia 10/15/2011     Hyperparathyroidism, secondary (H) 2015     Hypertension     resolved     Immunosuppressed status (H) 3/20/2014     Major depressive disorder, recurrent episode, moderate (H) 11/15/2012     Obesity (BMI 30-39.9)      OP (osteoporosis) T score -3.8 2009 T-score -3.7      LION (obstructive sleep apnoea) 10/15/2012    intol to cpa     Pain in joint, forearm -- L unhealed Fx 2013     Premature menopause age 37 7/10/2012    OCP (vaginal bldg)-->HT which she stopped 2 mo later documented at 2007 visit (age 51).      Restless leg syndrome      Rib fractures 2013     Sensory loss 10/17/2011    Bottom of feet; uncertain if there is a neuropathy per notes.       Stiffness of joint, not elsewhere classified, hand 3/5/2013     Tremor 10/15/2011    head     Uncomplicated asthma      Past Surgical History:   Procedure Laterality Date     ABDOMEN SURGERY       ANKLE SURGERY       C TRANSPLANTATION OF KIDNEY  3/2014     C/SECTION, LOW TRANSVERSE      x 2     CHOLECYSTECTOMY       COLONOSCOPY       ESOPHAGOSCOPY, GASTROSCOPY, DUODENOSCOPY (EGD), COMBINED N/A 2015    Procedure: COMBINED ESOPHAGOSCOPY,  GASTROSCOPY, DUODENOSCOPY (EGD);  Surgeon: Sky Davey MD;  Location:  GI     ESOPHAGOSCOPY, GASTROSCOPY, DUODENOSCOPY (EGD), COMBINED N/A 5/19/2015    Procedure: COMBINED ESOPHAGOSCOPY, GASTROSCOPY, DUODENOSCOPY (EGD), BIOPSY SINGLE OR MULTIPLE;  Surgeon: Sky Davey MD;  Location:  GI     EYE SURGERY       LAPAROSCOPY, SURGICAL; REPAIR INCISIONAL OR VENTRAL HERNIA       ORTHOPEDIC SURGERY       HERMINIA EN Y BOWEL  1990     WRIST SURGERY         Social History:      Family History:      Medications:  Current Outpatient Prescriptions   Medication Sig Dispense Refill     latanoprost (XALATAN) 0.005 % ophthalmic solution Place 1 drop into both eyes At Bedtime 2.5 mL 2     vilazodone (VIIBRYD) 40 MG TABS tablet Take 1 tablet (40 mg) by mouth daily 30 tablet 3     prazosin (MINIPRESS) 5 MG capsule Take 3 capsules (15 mg) by mouth At Bedtime 90 capsule 3     ARIPiprazole (ABILIFY) 2 MG tablet Take 0.5 tablets (1 mg) by mouth daily 15 tablet 3     ondansetron (ZOFRAN-ODT) 4 MG ODT tab Take 1 tablet (4 mg) by mouth every 6 hours as needed 20 tablet 3     mycophenolate (GENERIC EQUIVALENT) 250 MG capsule Take 4 capsules (1,000 mg) by mouth 2 times daily 240 capsule 11     sulfamethoxazole-trimethoprim (BACTRIM/SEPTRA) 400-80 MG per tablet Take 1 tablet by mouth daily 90 tablet 3     topiramate (TOPAMAX) 200 MG tablet Take 1 tablet (200 mg) by mouth 2 times daily 60 tablet 5     metFORMIN (GLUCOPHAGE-XR) 500 MG 24 hr tablet Take 3 tablets (1,500 mg) by mouth daily (with dinner) 90 tablet 11     cycloSPORINE modified (GENERIC EQUIVALENT) 25 MG capsule Take 5 capsules (125 mg) by mouth 2 times daily 300 capsule 6     albuterol (PROAIR HFA/PROVENTIL HFA/VENTOLIN HFA) 108 (90 BASE) MCG/ACT Inhaler Inhale 2 puffs into the lungs every 6 hours as needed for shortness of breath / dyspnea or wheezing 1 Inhaler 11     order for DME Walker with front wheels and a seat. 1 Units 0     Vaginal Lubricant (REPLENS) GEL  Use vaginally as needed. Can use up to 3 times per week. 35 g 11     furosemide (LASIX) 20 MG tablet Take 1 tablet (20 mg) by mouth daily 90 tablet 3     blood glucose monitoring (ACCU-CHEK RALPH PLUS) meter device kit   0     omeprazole (PRILOSEC) 40 MG capsule Take 1 capsule (40 mg) by mouth daily 90 capsule 3     simvastatin (ZOCOR) 20 MG tablet Take 1 tablet (20 mg) by mouth At Bedtime 90 tablet 3     Polyvinyl Alcohol-Povidone (REFRESH OP) Apply to eye as needed Both eyes       blood glucose monitoring (NO BRAND SPECIFIED) meter device kit Use to test blood sugar 2 times daily or as directed. 1 kit 0     blood glucose monitoring (NO BRAND SPECIFIED) test strip Use to test blood sugars 2 times daily or as directed 100 strip 11     blood glucose monitoring (SOFTCLIX) lancets Use to test blood sugar 2 times daily or as directed. 1 Box 11     acetylcysteine (N-ACETYL-L-CYSTEINE) 600 MG CAPS capsule Take 2 400 mg caps two times daily for total daily dose of 800 mg 30 capsule      Cholecalciferol (VITAMIN D) 1000 UNITS capsule 2,000 Units  30 capsule      aspirin EC 81 MG EC tablet Take 1 tablet (81 mg) by mouth daily 30 tablet 5     acetaminophen (TYLENOL) 325 MG tablet Take 325-650 mg by mouth as needed       cyanocolbalamin (VITAMIN  B-12) 1000 MCG tablet Take 1 tablet by mouth daily. (Patient taking differently: Take 1,000 mcg by mouth every other day ) 30 tablet 0     diphenhydrAMINE (BENADRYL) 25 MG capsule Take 25 mg by mouth At Bedtime.       econazole nitrate 1 % cream Apply topically daily (Patient not taking: Reported on 2/27/2018) 85 g 3     Allergies   Allergen Reactions     Percocet [Oxycodone-Acetaminophen] Nausea and Vomiting     Novocain [Procaine Hcl] Hives     Had reaction 25 years ago to old renetta. Pt reports multiple injections of lidocaine since then without reaction.  Tolerated lidocaine injection today without difficulty.  Osmar Mark MD IR Service.     Review of Systems:  -As per  HPI    -Skin: As above in HPI. No additional skin concerns.    Physical exam:  Vitals: /78  Pulse 80  GEN: This is a well developed, well-nourished female in no acute distress, in a pleasant mood.    SKIN: Total skin excluding the undergarment areas was performed. The exam included the head/face, neck, both arms, chest, back, abdomen, both legs, buttocks, digits and/or nails.   -Multiple regular brown pigmented macules and papules are identified on the face, trunk, and extremities. Multiple near c- section scar  -There are waxy stuck on tan to brown papules on the face, trunk, and extremities.  -There are dome shaped bright red papules on the body and extremities  -lentigo of the face.   -No other lesions of concern on areas examined.     Impression/Plan:  1. Seborrheic keratosis, non irritated    Reassurance    2. Multiple clinically benign nevi on the exam today    Reassurance    3. Cherry angioma(s)    No atypia    CC Dr. Sheridan on close of this encounter.  Follow-up in 1 year, earlier for new or changing lesions.     Staff Involved:  Staff Only    Patito Rush MD

## 2018-03-12 NOTE — MR AVS SNAPSHOT
After Visit Summary   3/12/2018    Elizabeth Palma    MRN: [id number]           Patient Information     Date Of Birth          1955        Visit Information        Provider Department      3/12/2018 1:10 PM Christian Jimenez MD [facility]        Today's Diagnoses     Aftercare following organ transplant    -  1    Type 2 diabetes mellitus with peripheral neuropathy (H)        Immunosuppressed status (H)        Autosomal dominant polycystic kidney disease        -donor kidney transplant        Hypertension secondary to other renal disorders        Immunosuppression (H)        Morbid obesity, unspecified obesity type (H)        Need for prophylactic antibiotic        Health care maintenance        Osteoporosis with current pathological fracture, unspecified osteoporosis type, sequela           Follow-ups after your visit        Follow-up notes from your care team     Return in about 1 year (around 3/12/2019).      Your next 10 appointments already scheduled     Mar 16, 2018  9:00 AM CDT   LAB with  LAB   [facility] ([facility])    [address] [place] [id number] [phone]           Please do not eat 10-12 hours before your appointment if you are coming in fasting for labs on lipids, cholesterol, or glucose (sugar). This does not apply to pregnant women. Water, hot tea and black coffee (with nothing added) are okay. Do not drink other fluids, diet soda or chew gum.            Mar 16, 2018  9:30 AM CDT   (Arrive by 9:15 AM)   NUTRITION VISIT with Leyla Guerrero RD   [facility] ([facility])    [address] [place] [address] [id number] [phone]            Mar 19, 2018  2:30 PM CDT   Return Visit with Javier Tuttle DPM   [facility]    [id number] [place] [id number] [phone]             Mar 27, 2018  1:00 PM CDT   Adult Med Follow UP with Chaparrita Hatch MD   [facility] ([facility])    [address]  [facility] [id number] [phone]            Apr 16, 2018 10:30 AM CDT   (Arrive by 10:15 AM)   Return Visit with Horacio Sheridan MD   [facility] ([facility])    [address] [facility] [id number] [phone]            May 07, 2018  2:00 PM CDT   (Arrive by 1:45 PM)   RETURN ENDOCRINE with Lizbet Byers MD   [facility] ([facility])    [address] [facility] [id number] [phone]            Jul 02, 2018 10:45 AM CDT   (Arrive by 10:30 AM)   Return Visit with Prakash Irene MD   [facility] ([facility])    [address] [facility] [id number] [phone]              Future tests that were ordered for you today     Open Standing Orders        Priority Remaining Interval Expires Ordered    CBC with platelets Routine 11/12 Monthly 3/12/2019 3/12/2018    Basic metabolic panel Routine 11/12 Monthly 3/12/2019 3/12/2018    Cyclosporine Routine 12/12 Monthly 3/12/2019 3/12/2018    BK virus PCR quantitative Routine 1/2 Every 6 Months 3/12/2019 3/12/2018    Protein  random urine with Creat Ratio Routine 1/2 Every 6 Months 3/12/2019 3/12/2018    PRA Donor Specific Antibody Routine 1/2 Every 6 Months 3/12/2019 3/12/2018            Who to contact     If you have questions or need follow up information about today's clinic visit or your schedule please contact [facility] directly at [phone].  Normal or non-critical lab and imaging results will be communicated to you by MyChart, letter or phone within 4 business days after the clinic has received the results. If you do not hear from us within 7 days, please contact the  "clinic through [employer] or phone. If you have a critical or abnormal lab result, we will notify you by phone as soon as possible.  Submit refill requests through [employer] or call your pharmacy and they will forward the refill request to us. Please allow 3 business days for your refill to be completed.          Additional Information About Your Visit        [email or web address] Information     [employer] gives you secure access to your electronic health record. If you see a primary care provider, you can also send messages to your care team and make appointments. If you have questions, please call your primary care clinic.  If you do not have a primary care provider, please call [phone] and they will assist you.        Care EveryWhere ID     This is your Care EveryWhere ID. This could be used by other organizations to access your [facility] medical records  [id number]        Your Vitals Were     Pulse Temperature Height Pulse Oximetry BMI (Body Mass Index)       70 99.3  F (37.4  C) (Oral) 1.549 m (5' 1\") 98% 29.44 kg/m2        Blood Pressure from Last 3 Encounters:   03/12/18 124/76   03/12/18 122/78   02/27/18 125/82    Weight from Last 3 Encounters:   03/12/18 70.7 kg (155 lb 12.8 oz)   02/16/18 72 kg (158 lb 11.2 oz)   02/12/18 72.3 kg (159 lb 6.4 oz)              Today, you had the following     No orders found for display         Today's Medication Changes          These changes are accurate as of 3/12/18  3:08 PM.  If you have any questions, ask your nurse or doctor.               These medicines have changed or have updated prescriptions.        Dose/Directions    cyanocobalamin 1000 MCG tablet   Commonly known as:  vitamin  B-12   This may have changed:  when to take this   Used for:  CKD (chronic kidney disease) stage 5, GFR less than 15 ml/min (H)        Dose:  1000 mcg   Take 1 tablet by mouth daily.   Quantity:  30 tablet   Refills:  0                Primary Care Provider Office Phone # Fax #    Horacio Sheridan MD " [phone]       [address] [place] [address]  [facility] [address]        Equal Access to Services     LINNEA HIDALGO : Hadii aad ku hadethan Quevedo, wareedda lubrit, qacarmenta kalluviada lor, ty zaratelupe kevin Haley [facility] [phone].    ATENCIÓN: Si habla español, tiene a goetz disposición servicios gratuitos de asistencia lingüística. Rachel al [phone].    We comply with applicable federal civil rights laws and [place] laws. We do not discriminate on the basis of race, color, national origin, age, disability, sex, sexual orientation, or gender identity.            Thank you!     Thank you for choosing [facility]  for your care. Our goal is always to provide you with excellent care. Hearing back from our patients is one way we can continue to improve our services. Please take a few minutes to complete the written survey that you may receive in the mail after your visit with us. Thank you!             Your Updated Medication List - Protect others around you: Learn how to safely use, store and throw away your medicines at www.disposemymeds.org.          This list is accurate as of 3/12/18  3:08 PM.  Always use your most recent med list.                   Brand Name Dispense Instructions for use Diagnosis    acetylcysteine 600 MG Caps capsule    N-ACETYL CYSTEINE    30 capsule    Take 2 400 mg caps two times daily for total daily dose of 800 mg        albuterol 108 (90 BASE) MCG/ACT Inhaler    PROAIR HFA/PROVENTIL HFA/VENTOLIN HFA    1 Inhaler    Inhale 2 puffs into the lungs every 6 hours as needed for shortness of breath / dyspnea or wheezing    Exercise-induced asthma       ARIPiprazole 2 MG tablet    ABILIFY    15 tablet    Take 0.5 tablets (1 mg) by mouth daily    Major depressive disorder, recurrent episode, moderate (H)       aspirin 81 MG EC tablet     30 tablet    Take 1 tablet (81 mg) by mouth daily    Kidney replaced by transplant       BENADRYL 25 MG capsule    Generic drug:  diphenhydrAMINE      Take 25 mg by mouth At Bedtime.        blood glucose monitoring lancets     1 Box    Use to test blood sugar 2 times daily or as directed.    Type 2 diabetes mellitus with peripheral neuropathy (H)       * blood glucose monitoring meter device kit    no brand specified    1 kit    Use to test blood sugar 2 times daily or as directed.    Type 2 diabetes mellitus with peripheral neuropathy (H)       * blood glucose monitoring meter device kit           blood glucose monitoring test strip    no brand specified    100 strip    Use to test blood sugars 2 times daily or as directed    Type 2 diabetes mellitus with peripheral neuropathy (H)       cyanocobalamin 1000 MCG tablet    vitamin  B-12    30 tablet    Take 1 tablet by mouth daily.    CKD (chronic kidney disease) stage 5, GFR less than 15 ml/min (H)       cycloSPORINE modified 25 MG capsule    GENERIC EQUIVALENT    300 capsule    Take 5 capsules (125 mg) by mouth 2 times daily    Kidney replaced by transplant       econazole nitrate 1 % cream     85 g    Apply topically daily    Type II or unspecified type diabetes mellitus with neurological manifestations, not stated as uncontrolled(250.60) (H), Dermatophytosis of foot       furosemide 20 MG tablet    LASIX    90 tablet    Take 1 tablet (20 mg) by mouth daily    Generalized edema       latanoprost 0.005 % ophthalmic solution    XALATAN    2.5 mL    Place 1 drop into both eyes At Bedtime    Mild stage glaucoma(365.71)       metFORMIN 500 MG 24 hr tablet    GLUCOPHAGE-XR    90 tablet    Take 3 tablets (1,500 mg) by mouth daily (with dinner)    Type 2 diabetes mellitus with diabetic polyneuropathy, without long-term current use of insulin (H)       mycophenolate 250 MG capsule    GENERIC EQUIVALENT    240 capsule    Take 4 capsules (1,000 mg) by mouth 2 times daily    Kidney transplanted       omeprazole 40 MG capsule    priLOSEC    90 capsule    Take 1 capsule (40 mg) by mouth  daily    Gastroesophageal reflux disease without esophagitis       ondansetron 4 MG ODT tab    ZOFRAN-ODT    20 tablet    Take 1 tablet (4 mg) by mouth every 6 hours as needed    Chronic kidney disease, stage V (H)       order for DME     1 Units    Walker with front wheels and a seat.    Fall, initial encounter       prazosin 5 MG capsule    MINIPRESS    90 capsule    Take 3 capsules (15 mg) by mouth At Bedtime    Nightmares associated with chronic post-traumatic stress disorder       REFRESH OP      Apply to eye as needed Both eyes        replens Gel     35 g    Use vaginally as needed. Can use up to 3 times per week.    Vaginal dryness       simvastatin 20 MG tablet    ZOCOR    90 tablet    Take 1 tablet (20 mg) by mouth At Bedtime    Chronic kidney disease, stage V (H)       sulfamethoxazole-trimethoprim 400-80 MG per tablet    BACTRIM/SEPTRA    90 tablet    Take 1 tablet by mouth daily    Immunosuppression (H)       topiramate 200 MG tablet    TOPAMAX    60 tablet    Take 1 tablet (200 mg) by mouth 2 times daily    Morbid obesity due to excess calories (H)       TYLENOL 325 MG tablet   Generic drug:  acetaminophen      Take 325-650 mg by mouth as needed        vilazodone 40 MG Tabs tablet    VIIBRYD    30 tablet    Take 1 tablet (40 mg) by mouth daily    Major depressive disorder, recurrent episode, moderate (H)       vitamin D 1000 UNITS capsule     30 capsule    2,000 Units        * Notice:  This list has 2 medication(s) that are the same as other medications prescribed for you. Read the directions carefully, and ask your doctor or other care provider to review them with you.

## 2018-03-12 NOTE — NURSING NOTE
Chief Complaint   Patient presents with     Skin Check     Elizabeth is here for TXP skin check   Pt roomed, vitals, meds, and allergies reviewed with pt. Pt ready for provider.  Andre Underwood, CMA

## 2018-03-12 NOTE — PROGRESS NOTES
Assessment and Plan:  1. DDKT - baseline creatinine is 0.9-1.0, normal proteinuria, no DSA.  Will make no changes at this time.  Her underlying disease is PKD but does not have any issues from her native kidneys at this time.  2. Immunosuppression - cyclosporine goal , mycophenolate 1000 mg bid  3. Post transplant Diabetes - her blood sugars are well controlled with a1c values at 6.4.  She follows with her primary care provider for management.  4. Hypertension - her blood pressure is well controlled.  No changes made today.  She is on a diuretic but without evidence of edema.  5. Obesity - Patient is actively loosing weight through dietary and medications.  She is on topiramate and managed by Dr. Jewell  6. Osteoporosis - the patient follows with Dr. Byers and has another appointment scheduled for 5/2018.   7. PCP prophylaxis - recommend continuing pcp prophylaxis indefinitely.  She is currently on bactrim ss daily and it is well tolerated.  8. Depression and PTSD - she reports well controlled symptoms and is following with psychology and psychiatry.  Her PTSD medication, doxazosin may be contributing to low blood pressures at night and fluid retention but these symptoms are manageable.  9. General health maintenance - colonoscopy 11/2017 with single 5 mm pedunculated polyp (tubulovillous adenoma, neg high grade dysplasia).  She will likely need another surveillance colonoscopy in 5 years.    Follow up in transplant nephrology clinic in 1 year.    Assessment and plan was discussed with patient and she voiced her understanding and agreement.    Reason for Visit:  Ms. Palma is here for routine follow up.    HPI:   Elizabeth Palma is a 62 year old female with ESKD from Polycystic Kidney Disease and is status post DDKT on 3/20/2014.         Transplant Hx:       Tx: DDKT  Date: 3/20/2014       Present Maintenance IS: Cyclosporine and Mycophenolate mofetil       Baseline Creatinine: 0.9-1.0       Recent DSA:  "No  Last checked: 3/2016       Biopsy: Yes 2014 - [facility]    The patient was last seen one year ago.  In the interim she denies any new complaints.  She denies recent hospitalizations.  She did have another wrist fracture in the summer and a surgery for it recently.  She notes that the wrist is healing well.  She will be following up with Dr. Byers in regards to her osteoporosis in 2018.  She is on annual infusions of zolendronic acid.  She has ongoing good energy levels and denies any issues of dypsnea or chest pain with activity.  She has ongoing weight loss with the help of topiramate.  Her psychiatric conditions are reportedly under good control.  She reports that her diabetes is under \"too good control\" and that there is talk to decrease her metformin dosing.  In regards to her kidneys, she does note occasional water retention on low dose of furosemide.  She denies nausea, vomiting, fevers, chills, or diarrhea.    Home BP: Not checked.      ROS:   A comprehensive review of systems was obtained and negative, except as noted in the HPI or PMH.    Active Medical Problems:  Patient Active Problem List   Diagnosis     Autosomal dominant polycystic kidney disease     Hypertension     Hyperlipidemia     Abnormal MRI, cervical spine     Sensory loss     Premature menopause age 37     OP (osteoporosis) T score -3.8     LION on CPAP     Major depressive disorder, recurrent episode, moderate (H)     Generalized anxiety disorder     CMC DJD(carpometacarpal degenerative joint disease), localized primary     Pain in joint, forearm -- L unhealed Fx     -donor kidney transplant     Immunosuppressed status (H)     Hyperparathyroidism, secondary (H)     Hyperparathyroidism (H)     Senile osteoporosis     Pain in joint involving ankle and foot     Nightmares associated with chronic post-traumatic stress disorder     Type 2 diabetes mellitus with peripheral neuropathy (H)     Posttraumatic stress disorder     Right knee " pain     Left knee pain     Age-related osteoporosis without current pathological fracture     Narcissistic personality disorder       Personal Hx:  Social History     Social History     Marital status:      Spouse name: Rahat     Number of children: 2     Years of education: N/A     Occupational History           part-time     Social History Main Topics     Smoking status: Former Smoker     Smokeless tobacco: Never Used     Alcohol use No     Drug use: No     Sexual activity: Yes     Partners: Male     Birth control/ protection: None      Comment: 1 partner     Other Topics Concern     Not on file     Social History Narrative       Allergies:  Allergies   Allergen Reactions     Percocet [Oxycodone-Acetaminophen] Nausea and Vomiting     Novocain [Procaine Hcl] Hives     Had reaction 25 years ago to old renetta. Pt reports multiple injections of lidocaine since then without reaction.  Tolerated lidocaine injection today without difficulty.  Osmar Mark MD IR Service.       Medications:  Prior to Admission medications    Medication Sig Start Date End Date Taking? Authorizing Provider   latanoprost (XALATAN) 0.005 % ophthalmic solution Place 1 drop into both eyes At Bedtime 2/27/18  Yes Yonas Underwood MD   vilazodone (VIIBRYD) 40 MG TABS tablet Take 1 tablet (40 mg) by mouth daily 2/27/18  Yes Chaparrita Hatch MD   prazosin (MINIPRESS) 5 MG capsule Take 3 capsules (15 mg) by mouth At Bedtime 2/27/18  Yes Chaparrita Hatch MD   ARIPiprazole (ABILIFY) 2 MG tablet Take 0.5 tablets (1 mg) by mouth daily 2/27/18  Yes Chaparrita Hatch MD   ondansetron (ZOFRAN-ODT) 4 MG ODT tab Take 1 tablet (4 mg) by mouth every 6 hours as needed 2/6/18  Yes Horacio Sheridan MD   mycophenolate (GENERIC EQUIVALENT) 250 MG capsule Take 4 capsules (1,000 mg) by mouth 2 times daily 12/13/17  Yes Hebert Urbina MD   sulfamethoxazole-trimethoprim (BACTRIM/SEPTRA) 400-80 MG per tablet Take 1 tablet by  mouth daily 12/6/17  Yes Hebert Urbina MD   topiramate (TOPAMAX) 200 MG tablet Take 1 tablet (200 mg) by mouth 2 times daily 11/20/17  Yes Prakash Irene MD   metFORMIN (GLUCOPHAGE-XR) 500 MG 24 hr tablet Take 3 tablets (1,500 mg) by mouth daily (with dinner) 11/10/17  Yes Horacio Sheridan MD   cycloSPORINE modified (GENERIC EQUIVALENT) 25 MG capsule Take 5 capsules (125 mg) by mouth 2 times daily 9/8/17  Yes Hebert Urbina MD   albuterol (PROAIR HFA/PROVENTIL HFA/VENTOLIN HFA) 108 (90 BASE) MCG/ACT Inhaler Inhale 2 puffs into the lungs every 6 hours as needed for shortness of breath / dyspnea or wheezing 8/17/17  Yes Susan Tate MD   order for DME Walker with front wheels and a seat. 8/17/17  Yes Susan Tate MD   Vaginal Lubricant (REPLENS) GEL Use vaginally as needed. Can use up to 3 times per week. 7/14/17  Yes Param Salas APRN CNM   furosemide (LASIX) 20 MG tablet Take 1 tablet (20 mg) by mouth daily 5/23/17  Yes Horacio Sheridan MD   blood glucose monitoring (ACCU-CHEK RALPH PLUS) meter device kit  5/4/17  Yes Reported, Patient   omeprazole (PRILOSEC) 40 MG capsule Take 1 capsule (40 mg) by mouth daily 4/25/17  Yes Horacio Sheridan MD   simvastatin (ZOCOR) 20 MG tablet Take 1 tablet (20 mg) by mouth At Bedtime 4/21/17  Yes Horacio Sheridan MD   Polyvinyl Alcohol-Povidone (REFRESH OP) Apply to eye as needed Both eyes   Yes Reported, Patient   blood glucose monitoring (NO BRAND SPECIFIED) meter device kit Use to test blood sugar 2 times daily or as directed. 4/14/17  Yes Horacio Sheridan MD   blood glucose monitoring (NO BRAND SPECIFIED) test strip Use to test blood sugars 2 times daily or as directed 4/14/17  Yes Horacio Sheridan MD   blood glucose monitoring (SOFTCLIX) lancets Use to test blood sugar 2 times daily or as directed. 4/14/17  Yes Horacio Sheridan MD   acetylcysteine (N-ACETYL-L-CYSTEINE) 600 MG CAPS capsule Take 2 400 mg caps two times daily for total daily dose of  "800 mg 7/12/16  Yes Chaparrita Hatch MD   Cholecalciferol (VITAMIN D) 1000 UNITS capsule 2,000 Units  2/26/15  Yes Fina Garcia MD   econazole nitrate 1 % cream Apply topically daily 4/30/14  Yes Javier Tuttle DPM   aspirin EC 81 MG EC tablet Take 1 tablet (81 mg) by mouth daily 3/24/14  Yes Berenice Torres APRN CNP   acetaminophen (TYLENOL) 325 MG tablet Take 325-650 mg by mouth as needed   Yes Reported, Patient   cyanocolbalamin (VITAMIN  B-12) 1000 MCG tablet Take 1 tablet by mouth daily.  Patient taking differently: Take 1,000 mcg by mouth every other day  5/29/12  Yes Juan Warren APRN CNP   diphenhydrAMINE (BENADRYL) 25 MG capsule Take 25 mg by mouth At Bedtime. 4/10/11  Yes Reported, Patient       Vitals:  /76 (BP Location: Right arm, Patient Position: Sitting, Cuff Size: Adult Regular)  Pulse 70  Temp 99.3  F (37.4  C) (Oral)  Ht 1.549 m (5' 1\")  Wt 70.7 kg (155 lb 12.8 oz)  SpO2 98%  BMI 29.44 kg/m2    Exam:   GENERAL APPEARANCE: alert and no distress  HENT: mouth without ulcers or lesions  LYMPHATICS: no cervical or supraclavicular nodes  RESP: lungs clear to auscultation - no rales, rhonchi or wheezes  CV: regular rhythm, normal rate, no rub, no murmur  EDEMA: no LE edema bilaterally  ABDOMEN: soft, nondistended, nontender, bowel sounds normal  MS: extremities normal - no gross deformities noted, no evidence of inflammation in joints, no muscle tenderness  SKIN: no rash  TX KIDNEY: normal    Results:   Recent Results (from the past 24 hour(s))   Protein  random urine with Creat Ratio    Collection Time: 03/12/18 12:29 PM   Result Value Ref Range    Protein Random Urine 0.06 g/L    Protein Total Urine g/gr Creatinine 0.18 0 - 0.2 g/g Cr   Creatinine urine calculation only    Collection Time: 03/12/18 12:29 PM   Result Value Ref Range    Creatinine Urine 32 mg/dL   CBC with platelets    Collection Time: 03/12/18 12:37 PM   Result Value Ref Range    WBC 4.0 4.0 " - 11.0 10e9/L    RBC Count 5.17 3.8 - 5.2 10e12/L    Hemoglobin 13.8 11.7 - 15.7 g/dL    Hematocrit 44.9 35.0 - 47.0 %    MCV 87 78 - 100 fl    MCH 26.7 26.5 - 33.0 pg    MCHC 30.7 (L) 31.5 - 36.5 g/dL    RDW 14.5 10.0 - 15.0 %    Platelet Count 191 150 - 450 10e9/L   Basic metabolic panel    Collection Time: 03/12/18 12:37 PM   Result Value Ref Range    Sodium 139 133 - 144 mmol/L    Potassium 3.7 3.4 - 5.3 mmol/L    Chloride 105 94 - 109 mmol/L    Carbon Dioxide 22 20 - 32 mmol/L    Anion Gap 12 3 - 14 mmol/L    Glucose 134 (H) 70 - 99 mg/dL    Urea Nitrogen 14 7 - 30 mg/dL    Creatinine 0.90 0.52 - 1.04 mg/dL    GFR Estimate 63 >60 mL/min/1.7m2    GFR Estimate If Black 77 >60 mL/min/1.7m2    Calcium 9.3 8.5 - 10.1 mg/dL     Patient was seen and evaluated by me, Christian Jimenez MD. I have reviewed the note and agree with the the plan of care as documented by the fellow.  Ms. Celaya is doing fairly well.  Continues with intentional weight loss.  She lost about 60 pounds total in the last couple of years.  She continues with her diabetes management on metformin.  Allograft functions appears stable.  Her blood pressure is well controlled.  We discussed the usual age-appropriate cancer screening and the need to follow-up with her PCP.  Kaya will return to clinic in a year or sooner if needed.  We made no changes to her immunosuppression medications at this point.

## 2018-03-12 NOTE — LETTER
3/12/2018      RE: Elizabeth Palma  [address]   [facility] [address]       Assessment and Plan:  1. DDKT - baseline creatinine is 0.9-1.0, normal proteinuria, no DSA.  Will make no changes at this time.  Her underlying disease is PKD but does not have any issues from her native kidneys at this time.  2. Immunosuppression - cyclosporine goal , mycophenolate 1000 mg bid  3. Post transplant Diabetes - her blood sugars are well controlled with a1c values at 6.4.  She follows with her primary care provider for management.  4. Hypertension - her blood pressure is well controlled.  No changes made today.  She is on a diuretic but without evidence of edema.  5. Obesity - Patient is actively loosing weight through dietary and medications.  She is on topiramate and managed by Dr. Jewell  6. Osteoporosis - the patient follows with Dr. Byers and has another appointment scheduled for 5/2018.   7. PCP prophylaxis - recommend continuing pcp prophylaxis indefinitely.  She is currently on bactrim ss daily and it is well tolerated.  8. Depression and PTSD - she reports well controlled symptoms and is following with psychology and psychiatry.  Her PTSD medication, doxazosin may be contributing to low blood pressures at night and fluid retention but these symptoms are manageable.  9. General health maintenance - colonoscopy 11/2017 with single 5 mm pedunculated polyp (tubulovillous adenoma, neg high grade dysplasia).  She will likely need another surveillance colonoscopy in 5 years.    Follow up in transplant nephrology clinic in 1 year.    Assessment and plan was discussed with patient and she voiced her understanding and agreement.    Reason for Visit:  Ms. Palma is here for routine follow up.    HPI:   Elizabeth Palma is a 62 year old female with ESKD from Polycystic Kidney Disease and is status post DDKT on 3/20/2014.         Transplant Hx:       Tx: DDKT  Date: 3/20/2014       Present Maintenance  "IS: Cyclosporine and Mycophenolate mofetil       Baseline Creatinine: 0.9-1.0       Recent DSA: No  Last checked: 3/2016       Biopsy: Yes 2014 - [facility]    The patient was last seen one year ago.  In the interim she denies any new complaints.  She denies recent hospitalizations.  She did have another wrist fracture in the summer and a surgery for it recently.  She notes that the wrist is healing well.  She will be following up with Dr. Byers in regards to her osteoporosis in 2018.  She is on annual infusions of zolendronic acid.  She has ongoing good energy levels and denies any issues of dypsnea or chest pain with activity.  She has ongoing weight loss with the help of topiramate.  Her psychiatric conditions are reportedly under good control.  She reports that her diabetes is under \"too good control\" and that there is talk to decrease her metformin dosing.  In regards to her kidneys, she does note occasional water retention on low dose of furosemide.  She denies nausea, vomiting, fevers, chills, or diarrhea.    Home BP: Not checked.      ROS:   A comprehensive review of systems was obtained and negative, except as noted in the HPI or PMH.    Active Medical Problems:  Patient Active Problem List   Diagnosis     Autosomal dominant polycystic kidney disease     Hypertension     Hyperlipidemia     Abnormal MRI, cervical spine     Sensory loss     Premature menopause age 37     OP (osteoporosis) T score -3.8     LION on CPAP     Major depressive disorder, recurrent episode, moderate (H)     Generalized anxiety disorder     CMC DJD(carpometacarpal degenerative joint disease), localized primary     Pain in joint, forearm -- L unhealed Fx     -donor kidney transplant     Immunosuppressed status (H)     Hyperparathyroidism, secondary (H)     Hyperparathyroidism (H)     Senile osteoporosis     Pain in joint involving ankle and foot     Nightmares associated with chronic post-traumatic stress disorder     Type 2 " diabetes mellitus with peripheral neuropathy (H)     Posttraumatic stress disorder     Right knee pain     Left knee pain     Age-related osteoporosis without current pathological fracture     Narcissistic personality disorder       Personal Hx:  Social History     Social History     Marital status:      Spouse name: Rahat     Number of children: 2     Years of education: N/A     Occupational History           part-time     Social History Main Topics     Smoking status: Former Smoker     Smokeless tobacco: Never Used     Alcohol use No     Drug use: No     Sexual activity: Yes     Partners: Male     Birth control/ protection: None      Comment: 1 partner     Other Topics Concern     Not on file     Social History Narrative       Allergies:  Allergies   Allergen Reactions     Percocet [Oxycodone-Acetaminophen] Nausea and Vomiting     Novocain [Procaine Hcl] Hives     Had reaction 25 years ago to old renetta. Pt reports multiple injections of lidocaine since then without reaction.  Tolerated lidocaine injection today without difficulty.  Osmar Mark MD IR Service.       Medications:  Prior to Admission medications    Medication Sig Start Date End Date Taking? Authorizing Provider   latanoprost (XALATAN) 0.005 % ophthalmic solution Place 1 drop into both eyes At Bedtime 2/27/18  Yes Yonas Underwood MD   vilazodone (VIIBRYD) 40 MG TABS tablet Take 1 tablet (40 mg) by mouth daily 2/27/18  Yes Chaparrita Hatch MD   prazosin (MINIPRESS) 5 MG capsule Take 3 capsules (15 mg) by mouth At Bedtime 2/27/18  Yes Chaparrita Hatch MD   ARIPiprazole (ABILIFY) 2 MG tablet Take 0.5 tablets (1 mg) by mouth daily 2/27/18  Yes Chaparrita Hatch MD   ondansetron (ZOFRAN-ODT) 4 MG ODT tab Take 1 tablet (4 mg) by mouth every 6 hours as needed 2/6/18  Yes Horacio Sheridan MD   mycophenolate (GENERIC EQUIVALENT) 250 MG capsule Take 4 capsules (1,000 mg) by mouth 2 times daily 12/13/17  Yes Hebert Urbina  MD Shawn   sulfamethoxazole-trimethoprim (BACTRIM/SEPTRA) 400-80 MG per tablet Take 1 tablet by mouth daily 12/6/17  Yes Hebert Urbina MD   topiramate (TOPAMAX) 200 MG tablet Take 1 tablet (200 mg) by mouth 2 times daily 11/20/17  Yes Prakash Irene MD   metFORMIN (GLUCOPHAGE-XR) 500 MG 24 hr tablet Take 3 tablets (1,500 mg) by mouth daily (with dinner) 11/10/17  Yes Horacio Sheridan MD   cycloSPORINE modified (GENERIC EQUIVALENT) 25 MG capsule Take 5 capsules (125 mg) by mouth 2 times daily 9/8/17  Yes Hebert Urbina MD   albuterol (PROAIR HFA/PROVENTIL HFA/VENTOLIN HFA) 108 (90 BASE) MCG/ACT Inhaler Inhale 2 puffs into the lungs every 6 hours as needed for shortness of breath / dyspnea or wheezing 8/17/17  Yes Susan Tate MD   order for DME Walker with front wheels and a seat. 8/17/17  Yes Susan Tate MD   Vaginal Lubricant (REPLENS) GEL Use vaginally as needed. Can use up to 3 times per week. 7/14/17  Yes Param Salas APRN CNM   furosemide (LASIX) 20 MG tablet Take 1 tablet (20 mg) by mouth daily 5/23/17  Yes Horacio Sheridan MD   blood glucose monitoring (ACCU-CHEK RALPH PLUS) meter device kit  5/4/17  Yes Reported, Patient   omeprazole (PRILOSEC) 40 MG capsule Take 1 capsule (40 mg) by mouth daily 4/25/17  Yes Horacio Sheridan MD   simvastatin (ZOCOR) 20 MG tablet Take 1 tablet (20 mg) by mouth At Bedtime 4/21/17  Yes Horacio Sheridan MD   Polyvinyl Alcohol-Povidone (REFRESH OP) Apply to eye as needed Both eyes   Yes Reported, Patient   blood glucose monitoring (NO BRAND SPECIFIED) meter device kit Use to test blood sugar 2 times daily or as directed. 4/14/17  Yes Horacio Sheridan MD   blood glucose monitoring (NO BRAND SPECIFIED) test strip Use to test blood sugars 2 times daily or as directed 4/14/17  Yes Horacio Sheridan MD   blood glucose monitoring (SOFTCLIX) lancets Use to test blood sugar 2 times daily or as directed. 4/14/17  Yes Sick, Horacio CODY MD   acetylcysteine  "(N-ACETYL-L-CYSTEINE) 600 MG CAPS capsule Take 2 400 mg caps two times daily for total daily dose of 800 mg 7/12/16  Yes Chaparrita Hatch MD   Cholecalciferol (VITAMIN D) 1000 UNITS capsule 2,000 Units  2/26/15  Yes Fina Garcia MD   econazole nitrate 1 % cream Apply topically daily 4/30/14  Yes Javier Tuttle DPM   aspirin EC 81 MG EC tablet Take 1 tablet (81 mg) by mouth daily 3/24/14  Yes Berenice Torres APRN CNP   acetaminophen (TYLENOL) 325 MG tablet Take 325-650 mg by mouth as needed   Yes Reported, Patient   cyanocolbalamin (VITAMIN  B-12) 1000 MCG tablet Take 1 tablet by mouth daily.  Patient taking differently: Take 1,000 mcg by mouth every other day  5/29/12  Yes Juan Warren APRN CNP   diphenhydrAMINE (BENADRYL) 25 MG capsule Take 25 mg by mouth At Bedtime. 4/10/11  Yes Reported, Patient       Vitals:  /76 (BP Location: Right arm, Patient Position: Sitting, Cuff Size: Adult Regular)  Pulse 70  Temp 99.3  F (37.4  C) (Oral)  Ht 1.549 m (5' 1\")  Wt 70.7 kg (155 lb 12.8 oz)  SpO2 98%  BMI 29.44 kg/m2    Exam:   GENERAL APPEARANCE: alert and no distress  HENT: mouth without ulcers or lesions  LYMPHATICS: no cervical or supraclavicular nodes  RESP: lungs clear to auscultation - no rales, rhonchi or wheezes  CV: regular rhythm, normal rate, no rub, no murmur  EDEMA: no LE edema bilaterally  ABDOMEN: soft, nondistended, nontender, bowel sounds normal  MS: extremities normal - no gross deformities noted, no evidence of inflammation in joints, no muscle tenderness  SKIN: no rash  TX KIDNEY: normal    Results:   Recent Results (from the past 24 hour(s))   Protein  random urine with Creat Ratio    Collection Time: 03/12/18 12:29 PM   Result Value Ref Range    Protein Random Urine 0.06 g/L    Protein Total Urine g/gr Creatinine 0.18 0 - 0.2 g/g Cr   Creatinine urine calculation only    Collection Time: 03/12/18 12:29 PM   Result Value Ref Range    Creatinine Urine 32 " mg/dL   CBC with platelets    Collection Time: 03/12/18 12:37 PM   Result Value Ref Range    WBC 4.0 4.0 - 11.0 10e9/L    RBC Count 5.17 3.8 - 5.2 10e12/L    Hemoglobin 13.8 11.7 - 15.7 g/dL    Hematocrit 44.9 35.0 - 47.0 %    MCV 87 78 - 100 fl    MCH 26.7 26.5 - 33.0 pg    MCHC 30.7 (L) 31.5 - 36.5 g/dL    RDW 14.5 10.0 - 15.0 %    Platelet Count 191 150 - 450 10e9/L   Basic metabolic panel    Collection Time: 03/12/18 12:37 PM   Result Value Ref Range    Sodium 139 133 - 144 mmol/L    Potassium 3.7 3.4 - 5.3 mmol/L    Chloride 105 94 - 109 mmol/L    Carbon Dioxide 22 20 - 32 mmol/L    Anion Gap 12 3 - 14 mmol/L    Glucose 134 (H) 70 - 99 mg/dL    Urea Nitrogen 14 7 - 30 mg/dL    Creatinine 0.90 0.52 - 1.04 mg/dL    GFR Estimate 63 >60 mL/min/1.7m2    GFR Estimate If Black 77 >60 mL/min/1.7m2    Calcium 9.3 8.5 - 10.1 mg/dL     Patient was seen and evaluated by me, Christian Jimenez MD. I have reviewed the note and agree with the the plan of care as documented by the fellow.  Ms. Celaya is doing fairly well.  Continues with intentional weight loss.  She lost about 60 pounds total in the last couple of years.  She continues with her diabetes management on metformin.  Allograft functions appears stable.  Her blood pressure is well controlled.  We discussed the usual age-appropriate cancer screening and the need to follow-up with her PCP.  Kaya will return to clinic in a year or sooner if needed.  We made no changes to her immunosuppression medications at this point.    Christian Jimenez MD

## 2018-03-12 NOTE — MR AVS SNAPSHOT
After Visit Summary   3/12/2018    Elizabeth Palma    MRN: [id number]           Patient Information     Date Of Birth          1955        Visit Information        Provider Department      3/12/2018 11:00 AM Patito Rush MD [facility]         Follow-ups after your visit        Your next 10 appointments already scheduled     Mar 12, 2018 12:00 PM CDT   Lab with  LAB   [facility] ([facility])    [address]  1st Floor  [facility] [address]   [phone]            Mar 12, 2018  1:10 PM CDT   (Arrive by 12:40 PM)   Return Kidney Transplant with Christian Jimenez MD   [facility] ([facility])    [address]  [address]  [facility] [id number] [phone]            Mar 16, 2018  9:00 AM CDT   LAB with  LAB   [facility] ([facility])    [address]  [address] [facility] [id number] [phone]           Please do not eat 10-12 hours before your appointment if you are coming in fasting for labs on lipids, cholesterol, or glucose (sugar). This does not apply to pregnant women. Water, hot tea and black coffee (with nothing added) are okay. Do not drink other fluids, diet soda or chew gum.            Mar 16, 2018  9:30 AM CDT   (Arrive by 9:15 AM)   NUTRITION VISIT with Leyla Guerrero RD   [facility] ([facility])    [address]  4th Floor  [facility] [id number] [phone]            Mar 19, 2018  2:30 PM CDT   Return Visit with Javier Tuttle DPM   [facility]    [address] [place] [address] [phone]            Mar 27, 2018  1:00 PM CDT   Adult Med Follow UP with Chaparrita Hatch MD   [facility] ([facility])    [address]  [facility] [address] [phone]             Apr 16, 2018 10:30 AM CDT   (Arrive by 10:15 AM)   Return Visit with Horacio Sheridan MD   [facility] ([facility])    [address] [place] [address]  [address]th [facility] [id number] [phone]            May 07, 2018  2:00 PM CDT   (Arrive by 1:45 PM)   RETURN ENDOCRINE with Lizbet Byers MD   [facility] ([facility])    [address] [place] [id number] [phone]            Jul 02, 2018 10:45 AM CDT   (Arrive by 10:30 AM)   Return Visit with Prakash Irene MD   [facility] ([facility])    [address] [place] [id number] [phone]              Who to contact     Please call your clinic at [phone] to:    Ask questions about your health    Make or cancel appointments    Discuss your medicines    Learn about your test results    Speak to your doctor            Additional Information About Your Visit        [employer] Information     [employer] gives you secure access to your electronic health record. If you see a primary care provider, you can also send messages to your care team and make appointments. If you have questions, please call your primary care clinic.  If you do not have a primary care provider, please call [phone] and they will assist you.      [employer] is an electronic gateway that provides easy, online access to your medical records. With [employer], you can request a clinic appointment, read your test results, renew a prescription or communicate with your care team.     To access your existing account, please contact your [facility] or call [phone] for assistance.        Care EveryWhere ID     This is your Care EveryWhere ID. This could be used by other organizations to access your [place] medical records  [id number]        Your Vitals Were     Pulse                    80            Blood Pressure from Last 3 Encounters:   03/12/18 122/78   02/27/18 125/82   02/12/18 115/70    Weight from Last 3 Encounters:   02/16/18 72 kg (158 lb 11.2 oz)   02/12/18 72.3 kg (159 lb 6.4 oz)   01/30/18 72.6 kg (160 lb)              Today, you had the following     No orders found for display         Today's Medication Changes          These changes are accurate as of 3/12/18 11:27 AM.  If you have any questions, ask your nurse or doctor.               These medicines have changed or have updated prescriptions.        Dose/Directions    cyanocobalamin 1000 MCG tablet   Commonly known as:  vitamin  B-12   This may have changed:  when to take this   Used for:  CKD (chronic kidney disease) stage 5, GFR less than 15 ml/min (H)        Dose:  1000 mcg   Take 1 tablet by mouth daily.   Quantity:  30 tablet   Refills:  0                Primary Care Provider Office Phone # Fax #    Horacio Sheridan [phone]       [address] [place] [address] [address] [place] [address]        Equal Access to Services     [facility]: Hadii riky richter Somartín, waaxda luqadaha, qaybta kaalmajuly horowitz, ty agrawal . So [facility] [phone].    ATENCIÓN: Si habla español, tiene a goetz disposición servicios gratuitos de asistencia lingüística. LlFire[facility] Center [phone].    We comply with applicable federal civil rights laws and [place] laws. We do not discriminate on the basis of race, color, national origin, age, disability, sex, sexual orientation, or gender identity.            Thank you!     Thank you for choosing [facility]  for your care. Our goal is always to provide you with excellent care. Hearing back from our patients is one way we can continue to improve our services. Please take a few minutes to complete the written survey that you may receive in the mail after your visit with us. Thank you!             Your Updated Medication List - Protect others  around you: Learn how to safely use, store and throw away your medicines at www.disposemymeds.org.          This list is accurate as of 3/12/18 11:27 AM.  Always use your most recent med list.                   Brand Name Dispense Instructions for use Diagnosis    acetylcysteine 600 MG Caps capsule    N-ACETYL CYSTEINE    30 capsule    Take 2 400 mg caps two times daily for total daily dose of 800 mg        albuterol 108 (90 BASE) MCG/ACT Inhaler    PROAIR HFA/PROVENTIL HFA/VENTOLIN HFA    1 Inhaler    Inhale 2 puffs into the lungs every 6 hours as needed for shortness of breath / dyspnea or wheezing    Exercise-induced asthma       ARIPiprazole 2 MG tablet    ABILIFY    15 tablet    Take 0.5 tablets (1 mg) by mouth daily    Major depressive disorder, recurrent episode, moderate (H)       aspirin 81 MG EC tablet     30 tablet    Take 1 tablet (81 mg) by mouth daily    Kidney replaced by transplant       BENADRYL 25 MG capsule   Generic drug:  diphenhydrAMINE      Take 25 mg by mouth At Bedtime.        blood glucose monitoring lancets     1 Box    Use to test blood sugar 2 times daily or as directed.    Type 2 diabetes mellitus with peripheral neuropathy (H)       * blood glucose monitoring meter device kit    no brand specified    1 kit    Use to test blood sugar 2 times daily or as directed.    Type 2 diabetes mellitus with peripheral neuropathy (H)       * blood glucose monitoring meter device kit           blood glucose monitoring test strip    no brand specified    100 strip    Use to test blood sugars 2 times daily or as directed    Type 2 diabetes mellitus with peripheral neuropathy (H)       cyanocobalamin 1000 MCG tablet    vitamin  B-12    30 tablet    Take 1 tablet by mouth daily.    CKD (chronic kidney disease) stage 5, GFR less than 15 ml/min (H)       cycloSPORINE modified 25 MG capsule    GENERIC EQUIVALENT    300 capsule    Take 5 capsules (125 mg) by mouth 2 times daily    Kidney replaced by  transplant       econazole nitrate 1 % cream     85 g    Apply topically daily    Type II or unspecified type diabetes mellitus with neurological manifestations, not stated as uncontrolled(250.60) (H), Dermatophytosis of foot       furosemide 20 MG tablet    LASIX    90 tablet    Take 1 tablet (20 mg) by mouth daily    Generalized edema       latanoprost 0.005 % ophthalmic solution    XALATAN    2.5 mL    Place 1 drop into both eyes At Bedtime    Mild stage glaucoma(365.71)       metFORMIN 500 MG 24 hr tablet    GLUCOPHAGE-XR    90 tablet    Take 3 tablets (1,500 mg) by mouth daily (with dinner)    Type 2 diabetes mellitus with diabetic polyneuropathy, without long-term current use of insulin (H)       mycophenolate 250 MG capsule    GENERIC EQUIVALENT    240 capsule    Take 4 capsules (1,000 mg) by mouth 2 times daily    Kidney transplanted       omeprazole 40 MG capsule    priLOSEC    90 capsule    Take 1 capsule (40 mg) by mouth daily    Gastroesophageal reflux disease without esophagitis       ondansetron 4 MG ODT tab    ZOFRAN-ODT    20 tablet    Take 1 tablet (4 mg) by mouth every 6 hours as needed    Chronic kidney disease, stage V (H)       order for DME     1 Units    Walker with front wheels and a seat.    Fall, initial encounter       prazosin 5 MG capsule    MINIPRESS    90 capsule    Take 3 capsules (15 mg) by mouth At Bedtime    Nightmares associated with chronic post-traumatic stress disorder       REFRESH OP      Apply to eye as needed Both eyes        replens Gel     35 g    Use vaginally as needed. Can use up to 3 times per week.    Vaginal dryness       simvastatin 20 MG tablet    ZOCOR    90 tablet    Take 1 tablet (20 mg) by mouth At Bedtime    Chronic kidney disease, stage V (H)       sulfamethoxazole-trimethoprim 400-80 MG per tablet    BACTRIM/SEPTRA    90 tablet    Take 1 tablet by mouth daily    Immunosuppression (H)       topiramate 200 MG tablet    TOPAMAX    60 tablet    Take 1 tablet  (200 mg) by mouth 2 times daily    Morbid obesity due to excess calories (H)       TYLENOL 325 MG tablet   Generic drug:  acetaminophen      Take 325-650 mg by mouth as needed        vilazodone 40 MG Tabs tablet    VIIBRYD    30 tablet    Take 1 tablet (40 mg) by mouth daily    Major depressive disorder, recurrent episode, moderate (H)       vitamin D 1000 UNITS capsule     30 capsule    2,000 Units        * Notice:  This list has 2 medication(s) that are the same as other medications prescribed for you. Read the directions carefully, and ask your doctor or other care provider to review them with you.

## 2018-03-12 NOTE — PROGRESS NOTES
ASSESSMENT AND PLAN:   1.  Status post kidney transplant with stable graft function.  We will continue to monitor.   2.  Immunosuppression.  Continue CellCept and cyclosporine with cyclosporine levels at target.   3.  Post-transplant diabetes, controlled overall on metformin and Januvia and follows with Endocrinology. I discussed with patient to consider Metformin replacement and to discuss this with her PCP  4.  Hypertension.  Blood pressure well controlled, takes minipress for PTSD.   5.  Obesity.  Actively loosing weight   6.  General health maintenance.  Follows with Dermatology for skin cancer screening.     Reason for Visit:   Post transplant follow up.  HPI:   Elizabeth Palma is here for a followup visit.  She is a 60-year-old female who has a history of end-stage kidney disease secondary to polycystic kidney disease, status post  donor kidney transplant on 2014 with excellent graft function with creatinine 0.8-1 mg/dL.  She has had 1 kidney biopsy which was negative for rejection and overall unremarkable .     Since she was seen last, she has been doing well. No specific complaints or concerns. Taking her medications regularly. No NVD    Transplant Hx:   Tx: DDKT Date: 3/20/14   Present Maintenance IS: Tacrolimus and Mycophenolate mofetil   Baseline Creatinine: 1 mg/dL  Recent DSA: No   Biopsy: yes on 14   Assessment and plan was discussed with patient and she voiced her understanding and agreement.    ROS:    A comprehensive review of systems was obtained and negative, except as noted in the HPI or PMH.    Active Medical Problems:  Patient Active Problem List   Diagnosis     Autosomal dominant polycystic kidney disease     Hypertension     Hyperlipidemia     Abnormal MRI, cervical spine     Sensory loss     Premature menopause age 37     OP (osteoporosis) T score -3.8     LION on CPAP     Major depressive disorder, recurrent episode, moderate (H)     Generalized anxiety disorder     CMC  DJD(carpometacarpal degenerative joint disease), localized primary     Pain in joint, forearm -- L unhealed Fx     -donor kidney transplant     Immunosuppressed status (H)     Hyperparathyroidism, secondary (H)     Hyperparathyroidism (H)     Senile osteoporosis     Pain in joint involving ankle and foot     Nightmares associated with chronic post-traumatic stress disorder     Type 2 diabetes mellitus with peripheral neuropathy (H)     Posttraumatic stress disorder     Right knee pain     Left knee pain     Age-related osteoporosis without current pathological fracture     Narcissistic personality disorder       Personal Hx:  Social History     Social History     Marital status:      Spouse name: Rahat     Number of children: 2     Years of education: N/A     Occupational History           part-time     Social History Main Topics     Smoking status: Former Smoker     Smokeless tobacco: Never Used     Alcohol use No     Drug use: No     Sexual activity: Yes     Partners: Male     Birth control/ protection: None      Comment: 1 partner     Other Topics Concern     Not on file     Social History Narrative       Allergies:  Allergies   Allergen Reactions     Percocet [Oxycodone-Acetaminophen] Nausea and Vomiting     Novocain [Procaine Hcl] Hives     Had reaction 25 years ago to old renetta. Pt reports multiple injections of lidocaine since then without reaction.  Tolerated lidocaine injection today without difficulty.  Osmar Mark MD IR Service.       Medications:  Current Outpatient Prescriptions   Medication     latanoprost (XALATAN) 0.005 % ophthalmic solution     vilazodone (VIIBRYD) 40 MG TABS tablet     prazosin (MINIPRESS) 5 MG capsule     ARIPiprazole (ABILIFY) 2 MG tablet     ondansetron (ZOFRAN-ODT) 4 MG ODT tab     mycophenolate (GENERIC EQUIVALENT) 250 MG capsule     sulfamethoxazole-trimethoprim (BACTRIM/SEPTRA) 400-80 MG per tablet     topiramate (TOPAMAX) 200 MG tablet     metFORMIN  "(GLUCOPHAGE-XR) 500 MG 24 hr tablet     cycloSPORINE modified (GENERIC EQUIVALENT) 25 MG capsule     albuterol (PROAIR HFA/PROVENTIL HFA/VENTOLIN HFA) 108 (90 BASE) MCG/ACT Inhaler     order for DME     Vaginal Lubricant (REPLENS) GEL     furosemide (LASIX) 20 MG tablet     blood glucose monitoring (ACCU-CHEK RALPH PLUS) meter device kit     omeprazole (PRILOSEC) 40 MG capsule     simvastatin (ZOCOR) 20 MG tablet     Polyvinyl Alcohol-Povidone (REFRESH OP)     blood glucose monitoring (NO BRAND SPECIFIED) meter device kit     blood glucose monitoring (NO BRAND SPECIFIED) test strip     blood glucose monitoring (SOFTCLIX) lancets     acetylcysteine (N-ACETYL-L-CYSTEINE) 600 MG CAPS capsule     Cholecalciferol (VITAMIN D) 1000 UNITS capsule     econazole nitrate 1 % cream     aspirin EC 81 MG EC tablet     acetaminophen (TYLENOL) 325 MG tablet     cyanocolbalamin (VITAMIN  B-12) 1000 MCG tablet     diphenhydrAMINE (BENADRYL) 25 MG capsule     No current facility-administered medications for this visit.      Vitals:  /76 (BP Location: Right arm, Patient Position: Sitting, Cuff Size: Adult Regular)  Pulse 70  Temp 99.3  F (37.4  C) (Oral)  Ht 1.549 m (5' 1\")  Wt 70.7 kg (155 lb 12.8 oz)  SpO2 98%  BMI 29.44 kg/m2    Exam:   GENERAL APPEARANCE: alert and no distress  HENT: mouth without ulcers or lesions  LYMPHATICS: no cervical or supraclavicular nodes  RESP: lungs clear to auscultation - no rales, rhonchi or wheezes  CV: regular rhythm, normal rate, no rub, no murmur  EDEMA: no LE edema bilaterally  ABDOMEN: soft, nondistended, nontender,     SKIN: no rash      "

## 2018-03-12 NOTE — PROGRESS NOTES
[facility] Dermatology Note      Dermatology Problem List:  1. SKs  2. Benign nevi  3. xerosis  4. Renal transplant 3/20/14     CC:   Chief Complaint   Patient presents with     Skin Check     Elizabeth is here for TXP skin check         Encounter Date: Mar 12, 2018    History of Present Illness:  Ms. Elizabeth Palma is a 62 year old female who with no history of skin cancer presents for her FBSE.  She had a renal transplant in .  She notes no lesions of concern.  She did have Dr Sheridan look at a lesion near her c sxn scar and was told it was an age related lesion without concern.  She reports her  is in the hospital with pneumonia but otherwise she is doing well.    Past Medical History:   Patient Active Problem List   Diagnosis     Autosomal dominant polycystic kidney disease     Hypertension     Hyperlipidemia     Abnormal MRI, cervical spine     Sensory loss     Premature menopause age 37     OP (osteoporosis) T score -3.8     LION on CPAP     Major depressive disorder, recurrent episode, moderate (H)     Generalized anxiety disorder     CMC DJD(carpometacarpal degenerative joint disease), localized primary     Pain in joint, forearm -- L unhealed Fx     -donor kidney transplant     Immunosuppressed status (H)     Hyperparathyroidism, secondary (H)     Hyperparathyroidism (H)     Senile osteoporosis     Pain in joint involving ankle and foot     Nightmares associated with chronic post-traumatic stress disorder     Type 2 diabetes mellitus with peripheral neuropathy (H)     Posttraumatic stress disorder     Right knee pain     Left knee pain     Age-related osteoporosis without current pathological fracture     Narcissistic personality disorder     Past Medical History:   Diagnosis Date     Abnormal MRI, cervical spine 10/15/2011    2011; mild changes noted. Study done for left arm symptoms Impression:  1. Mild multilevel degenerative disc disease with no significant canal or  neural stenosis seen. motion artifact on the STIR images in these are not interpretable. The remaining images were interpreted      Autosomal dominant polycystic kidney disease 2011     (Problem list name updated by automated process. Provider to review and confirm.)     CMC DJD(carpometacarpal degenerative joint disease), localized primary 3/5/2013     -donor kidney transplant 3/20/2014     Depressive disorder 11/15/2012     DM type 2 (diabetes mellitus, type 2) (H) 2013     Encounter for long-term (current) use of other medications 2015     Family history of tremor 10/17/2011     Generalized anxiety disorder 11/15/2012     Glaucoma      Hyperlipidemia 10/15/2011     Hyperparathyroidism, secondary (H) 2015     Hypertension     resolved     Immunosuppressed status (H) 3/20/2014     Major depressive disorder, recurrent episode, moderate (H) 11/15/2012     Obesity (BMI 30-39.9)      OP (osteoporosis) T score -3.8 2009 T-score -3.7      LION (obstructive sleep apnoea) 10/15/2012    intol to cpa     Pain in joint, forearm -- L unhealed Fx 2013     Premature menopause age 37 7/10/2012    OCP (vaginal bldg)-->HT which she stopped 2 mo later documented at 2007 visit (age 51).      Restless leg syndrome      Rib fractures 2013     Sensory loss 10/17/2011    Bottom of feet; uncertain if there is a neuropathy per notes.       Stiffness of joint, not elsewhere classified, hand 3/5/2013     Tremor 10/15/2011    head     Uncomplicated asthma      Past Surgical History:   Procedure Laterality Date     ABDOMEN SURGERY       ANKLE SURGERY       C TRANSPLANTATION OF KIDNEY  3/2014     C/SECTION, LOW TRANSVERSE      x 2     CHOLECYSTECTOMY       COLONOSCOPY       ESOPHAGOSCOPY, GASTROSCOPY, DUODENOSCOPY (EGD), COMBINED N/A 2015    Procedure: COMBINED ESOPHAGOSCOPY, GASTROSCOPY, DUODENOSCOPY (EGD);  Surgeon: Sky Davey MD;  Location:  GI     ESOPHAGOSCOPY,  GASTROSCOPY, DUODENOSCOPY (EGD), COMBINED N/A 5/19/2015    Procedure: COMBINED ESOPHAGOSCOPY, GASTROSCOPY, DUODENOSCOPY (EGD), BIOPSY SINGLE OR MULTIPLE;  Surgeon: Sky Daevy MD;  Location:  GI     EYE SURGERY       LAPAROSCOPY, SURGICAL; REPAIR INCISIONAL OR VENTRAL HERNIA       ORTHOPEDIC SURGERY       HERMINIA EN Y BOWEL  1990     WRIST SURGERY         Social History:      Family History:      Medications:  Current Outpatient Prescriptions   Medication Sig Dispense Refill     latanoprost (XALATAN) 0.005 % ophthalmic solution Place 1 drop into both eyes At Bedtime 2.5 mL 2     vilazodone (VIIBRYD) 40 MG TABS tablet Take 1 tablet (40 mg) by mouth daily 30 tablet 3     prazosin (MINIPRESS) 5 MG capsule Take 3 capsules (15 mg) by mouth At Bedtime 90 capsule 3     ARIPiprazole (ABILIFY) 2 MG tablet Take 0.5 tablets (1 mg) by mouth daily 15 tablet 3     ondansetron (ZOFRAN-ODT) 4 MG ODT tab Take 1 tablet (4 mg) by mouth every 6 hours as needed 20 tablet 3     mycophenolate (GENERIC EQUIVALENT) 250 MG capsule Take 4 capsules (1,000 mg) by mouth 2 times daily 240 capsule 11     sulfamethoxazole-trimethoprim (BACTRIM/SEPTRA) 400-80 MG per tablet Take 1 tablet by mouth daily 90 tablet 3     topiramate (TOPAMAX) 200 MG tablet Take 1 tablet (200 mg) by mouth 2 times daily 60 tablet 5     metFORMIN (GLUCOPHAGE-XR) 500 MG 24 hr tablet Take 3 tablets (1,500 mg) by mouth daily (with dinner) 90 tablet 11     cycloSPORINE modified (GENERIC EQUIVALENT) 25 MG capsule Take 5 capsules (125 mg) by mouth 2 times daily 300 capsule 6     albuterol (PROAIR HFA/PROVENTIL HFA/VENTOLIN HFA) 108 (90 BASE) MCG/ACT Inhaler Inhale 2 puffs into the lungs every 6 hours as needed for shortness of breath / dyspnea or wheezing 1 Inhaler 11     order for DME Walker with front wheels and a seat. 1 Units 0     Vaginal Lubricant (REPLENS) GEL Use vaginally as needed. Can use up to 3 times per week. 35 g 11     furosemide (LASIX) 20 MG tablet Take  1 tablet (20 mg) by mouth daily 90 tablet 3     blood glucose monitoring (ACCU-CHEK RALPH PLUS) meter device kit   0     omeprazole (PRILOSEC) 40 MG capsule Take 1 capsule (40 mg) by mouth daily 90 capsule 3     simvastatin (ZOCOR) 20 MG tablet Take 1 tablet (20 mg) by mouth At Bedtime 90 tablet 3     Polyvinyl Alcohol-Povidone (REFRESH OP) Apply to eye as needed Both eyes       blood glucose monitoring (NO BRAND SPECIFIED) meter device kit Use to test blood sugar 2 times daily or as directed. 1 kit 0     blood glucose monitoring (NO BRAND SPECIFIED) test strip Use to test blood sugars 2 times daily or as directed 100 strip 11     blood glucose monitoring (SOFTCLIX) lancets Use to test blood sugar 2 times daily or as directed. 1 Box 11     acetylcysteine (N-ACETYL-L-CYSTEINE) 600 MG CAPS capsule Take 2 400 mg caps two times daily for total daily dose of 800 mg 30 capsule      Cholecalciferol (VITAMIN D) 1000 UNITS capsule 2,000 Units  30 capsule      aspirin EC 81 MG EC tablet Take 1 tablet (81 mg) by mouth daily 30 tablet 5     acetaminophen (TYLENOL) 325 MG tablet Take 325-650 mg by mouth as needed       cyanocolbalamin (VITAMIN  B-12) 1000 MCG tablet Take 1 tablet by mouth daily. (Patient taking differently: Take 1,000 mcg by mouth every other day ) 30 tablet 0     diphenhydrAMINE (BENADRYL) 25 MG capsule Take 25 mg by mouth At Bedtime.       econazole nitrate 1 % cream Apply topically daily (Patient not taking: Reported on 2/27/2018) 85 g 3     Allergies   Allergen Reactions     Percocet [Oxycodone-Acetaminophen] Nausea and Vomiting     Novocain [Procaine Hcl] Hives     Had reaction 25 years ago to old renetta. Pt reports multiple injections of lidocaine since then without reaction.  Tolerated lidocaine injection today without difficulty.  Osmar Mark MD IR Service.         Review of Systems:  -As per HPI    -Skin: As above in HPI. No additional skin concerns.    Physical exam:  Vitals: /78  Pulse  80  GEN: This is a well developed, well-nourished female in no acute distress, in a pleasant mood.    SKIN: Total skin excluding the undergarment areas was performed. The exam included the head/face, neck, both arms, chest, back, abdomen, both legs, buttocks, digits and/or nails.   -Multiple regular brown pigmented macules and papules are identified on the face, trunk, and extremities. Multiple near c- section scar  -There are waxy stuck on tan to brown papules on the face, trunk, and extremities.  -There are dome shaped bright red papules on the body and extremities  -lentigo of the face.   -No other lesions of concern on areas examined.     Impression/Plan:  1. Seborrheic keratosis, non irritated    Reassurance    2. Multiple clinically benign nevi on the exam today    Reassurance      3. Cherry angioma(s)    No atypia        CC Dr. Sheridan on close of this encounter.  Follow-up in 1 year, earlier for new or changing lesions.       Staff Involved:  Staff Only

## 2018-03-12 NOTE — NURSING NOTE
"Chief Complaint   Patient presents with     RECHECK     kidney Tx       Initial /76 (BP Location: Right arm, Patient Position: Sitting, Cuff Size: Adult Regular)  Pulse 70  Temp 99.3  F (37.4  C) (Oral)  Ht 1.549 m (5' 1\")  Wt 70.7 kg (155 lb 12.8 oz)  SpO2 98%  BMI 29.44 kg/m2 Estimated body mass index is 29.44 kg/(m^2) as calculated from the following:    Height as of this encounter: 1.549 m (5' 1\").    Weight as of this encounter: 70.7 kg (155 lb 12.8 oz).  Medication Reconciliation: complete     Keyanna Milan MA    "

## 2018-03-13 LAB — PRA DONOR SPECIFIC ABY: NORMAL

## 2018-03-14 LAB
BKV DNA # SPEC NAA+PROBE: NORMAL COPIES/ML
BKV DNA SPEC NAA+PROBE-LOG#: NORMAL LOG COPIES/ML
SPECIMEN SOURCE: NORMAL

## 2018-03-15 LAB
DONOR IDENTIFICATION: NORMAL
DSA COMMENTS: NORMAL
DSA PRESENT: NO
DSA TEST METHOD: NORMAL
ORGAN: NORMAL
SA1 CELL: NORMAL
SA1 COMMENTS: NORMAL
SA1 HI RISK ABY: NORMAL
SA1 MOD RISK ABY: NORMAL
SA1 TEST METHOD: NORMAL
SA2 CELL: NORMAL
SA2 COMMENTS: NORMAL
SA2 HI RISK ABY UA: NORMAL
SA2 MOD RISK ABY: NORMAL
SA2 TEST METHOD: NORMAL
UNOS CPRA: 0

## 2018-03-16 ENCOUNTER — ALLIED HEALTH/NURSE VISIT (OUTPATIENT)
Dept: SURGERY | Facility: CLINIC | Age: 63
End: 2018-03-16
Payer: MEDICARE

## 2018-03-16 VITALS — WEIGHT: 158.5 LBS | BODY MASS INDEX: 29.95 KG/M2

## 2018-03-16 DIAGNOSIS — Z48.298 AFTERCARE FOLLOWING ORGAN TRANSPLANT: ICD-10-CM

## 2018-03-16 LAB
CYCLOSPORINE BLD LC/MS/MS-MCNC: 100 UG/L (ref 50–400)
TME LAST DOSE: 2100 H

## 2018-03-16 NOTE — PROGRESS NOTES
"Nutrition Reassessment  Reason For Visit:  Elizabeth Palma is a 62 year-old female with type 2 DM (oral med management) presenting today for nutrition follow-up, s/p \"gastric bypass in 1990 at [facility]\" per Pt report.  She is seeing medical weight management.  She was referred by Dr. Irene.  Note: Pt had a kidney transplant on March 20, 2014.    Pt was accompanied by her .     Anthropometrics:  Height: 61\"  Pre-op Weight: 265 lbs per Pt report; lowest weight after bariatric surgery: 165-170 lbs (25 years ago)  (Pt reported that she initially was at 215 lbs in 2015 prior to seeing writer.)    Current Weight: 158.5 lbs (-0.2 lbs over the past month) with BMI of 29.44.    Current Vitamins/Minerals: 3000 International Units vitamin D/day, Calcium, vitamin C, vitamin B12 daily, B-complex  *Pt stated that she was told not to take other vitamins/minerals by MD.    Nutrition History:  Lactose intolerance ever since bariatric surgery.  Pt stated that she has osteoporosis; however, she stated that her doctors don't want her to take any vitamins or minerals due to her kidney transplant.    Diet/Nutrition Intake Hx: Pt reports her intake varies due to fluctuating appetite. Per pt there are days when she eats 3 meals but on other days she may not eat much at all or nibble on something through out the day. Pt also states her intake is affected by nausea 2/2 her anti-rejection medications. However pt reports she tries to make healthy choices (lower in calorie). Pt is also limited in protein choices that she likes - pt reports she does not take much dairy, does not like beans and lentils, keeps kosher. Advised pt to try to time her meals and eat every 4 hours instead of grazing but pt refused stating she would rather not eat at all.     *Pt stated that she will not record food intake due to the fact that every time she does this, she simply does not eat as she doesn't want to record.  She stated that her therapist " strongly advises against recording food intake for this reason.    Physical Activity Note: Pt reports she has a tendency to break bones so she is limited with what exercises she does. Pt states there is a long hallway in the building that she needs to walk when going out. Pt states she does not walk for exercises but walks only to get ADLs done.      Progress with Previous Goals:    1. Avoid grazing through, Eat 3 meals with lean protein at each meal, along with a non-starchy vegetable or whole fruit, up to 1/2 c carb at a meal.  sick hospital so she did not eat regular meals and ran out of food at home   -Try hard-boiled eggs to put on your salad or to have later in the day to make up for skipping breakfast.   eggs no protein bars left in the house  2. If needing a snack, have vegetables (give at least 2 hours between a meals and a snack). Spoon full of PB  3. Walk 5-10 min daily, increasing as able. Limited activity due to  being sick and in the hospital, now trying to catch up on laundry/dishes and grocery shopping  4. Check vitamin and mineral labs at next doctor appointment due to history of bariatric surgery (vitamin A, Vitamin D, zinc, iron, B12, B1, calcium, PTH). continues    Nutrition Prescription:  Grams Protein: 60 (minimum)  Amount of Fluid: 48-64 oz    Nutrition Diagnosis  Previous: Obesity related to excessive energy intake as evidenced by BMI > 30.- Resolved     Current: Overweight/Obesity related to excessive energy intake as evidenced by BMI > 25.      Intervention  Intervention At Appointment:  Materials/Education provided:  Reviewed previous goals patient reported that her  was sick with pneumonia and in the hospital for the past week.  This caused increased stress and limited food choices due to not being able to get to the grocery store and running out of protein options at home.  Patient noted that passover is approaching and this will also be a difficult time for  her regarding food choices and the stress of preparing, gave encouragement and support.    Patient Understanding: good  Expected Compliance: good with continued RD follow up.    Goals:   1. Avoid grazing through, Eat 3 meals with lean protein at each meal, along with a non-starchy vegetable or whole fruit, up to 1/2 c carb at a meal.   -Try hard-boiled eggs to put on your salad or to have later in the day to make up for skipping breakfast.    2. If needing a snack, have vegetables (give at least 2 hours between a meals and a snack).  3. Walk 5-10 min daily, increasing as able.  4. Check vitamin and mineral labs at next doctor appointment due to history of bariatric surgery (vitamin A, Vitamin D, zinc, iron, B12, B1, calcium, PTH).    Follow-Up: 1 month    Time spent with patient: 15 minutes.  Leyla Guerrero, RD, LD

## 2018-03-19 ENCOUNTER — OFFICE VISIT (OUTPATIENT)
Dept: PODIATRY | Facility: CLINIC | Age: 63
End: 2018-03-19
Payer: COMMERCIAL

## 2018-03-19 VITALS — DIASTOLIC BLOOD PRESSURE: 78 MMHG | OXYGEN SATURATION: 98 % | SYSTOLIC BLOOD PRESSURE: 124 MMHG | HEART RATE: 79 BPM

## 2018-03-19 DIAGNOSIS — M20.41 HAMMERTOES OF BOTH FEET: ICD-10-CM

## 2018-03-19 DIAGNOSIS — L60.2 ONYCHAUXIS: Primary | ICD-10-CM

## 2018-03-19 DIAGNOSIS — M20.42 HAMMERTOES OF BOTH FEET: ICD-10-CM

## 2018-03-19 DIAGNOSIS — E11.49 TYPE II OR UNSPECIFIED TYPE DIABETES MELLITUS WITH NEUROLOGICAL MANIFESTATIONS, NOT STATED AS UNCONTROLLED(250.60) (H): ICD-10-CM

## 2018-03-19 PROCEDURE — 99213 OFFICE O/P EST LOW 20 MIN: CPT | Performed by: PODIATRIST

## 2018-03-19 NOTE — PROGRESS NOTES
Past Medical History:   Diagnosis Date     Abnormal MRI, cervical spine 10/15/2011    2011; mild changes noted. Study done for left arm symptoms Impression:  1. Mild multilevel degenerative disc disease with no significant canal or neural stenosis seen. motion artifact on the STIR images in these are not interpretable. The remaining images were interpreted      Autosomal dominant polycystic kidney disease 2011     (Problem list name updated by automated process. Provider to review and confirm.)     CMC DJD(carpometacarpal degenerative joint disease), localized primary 3/5/2013     -donor kidney transplant 3/20/2014     Depressive disorder 11/15/2012     DM type 2 (diabetes mellitus, type 2) (H) 2013     Encounter for long-term (current) use of other medications 2015     Family history of tremor 10/17/2011     Generalized anxiety disorder 11/15/2012     Glaucoma      Hyperlipidemia 10/15/2011     Hyperparathyroidism, secondary (H) 2015     Hypertension     resolved     Immunosuppressed status (H) 3/20/2014     Major depressive disorder, recurrent episode, moderate (H) 11/15/2012     Obesity (BMI 30-39.9)      OP (osteoporosis) T score -3.8 2009 T-score -3.7      LION (obstructive sleep apnoea) 10/15/2012    intol to cpa     Pain in joint, forearm -- L unhealed Fx 2013     Premature menopause age 37 7/10/2012    OCP (vaginal bldg)-->HT which she stopped 2 mo later documented at 2007 visit (age 51).      Restless leg syndrome      Rib fractures 2013     Sensory loss 10/17/2011    Bottom of feet; uncertain if there is a neuropathy per notes.       Stiffness of joint, not elsewhere classified, hand 3/5/2013     Tremor 10/15/2011    head     Uncomplicated asthma      Patient Active Problem List   Diagnosis     Autosomal dominant polycystic kidney disease     Hypertension     Hyperlipidemia     Abnormal MRI, cervical spine     Sensory loss     Premature menopause  age 37     OP (osteoporosis) T score -3.8     LION on CPAP     Major depressive disorder, recurrent episode, moderate (H)     Generalized anxiety disorder     CMC DJD(carpometacarpal degenerative joint disease), localized primary     Pain in joint, forearm -- L unhealed Fx     -donor kidney transplant     Immunosuppressed status (H)     Hyperparathyroidism, secondary (H)     Hyperparathyroidism (H)     Senile osteoporosis     Pain in joint involving ankle and foot     Nightmares associated with chronic post-traumatic stress disorder     Type 2 diabetes mellitus with peripheral neuropathy (H)     Posttraumatic stress disorder     Right knee pain     Left knee pain     Age-related osteoporosis without current pathological fracture     Narcissistic personality disorder     Past Surgical History:   Procedure Laterality Date     ABDOMEN SURGERY       ANKLE SURGERY       C TRANSPLANTATION OF KIDNEY  3/2014     C/SECTION, LOW TRANSVERSE      x 2     CHOLECYSTECTOMY       COLONOSCOPY       ESOPHAGOSCOPY, GASTROSCOPY, DUODENOSCOPY (EGD), COMBINED N/A 2015    Procedure: COMBINED ESOPHAGOSCOPY, GASTROSCOPY, DUODENOSCOPY (EGD);  Surgeon: Sky Davey MD;  Location:  GI     ESOPHAGOSCOPY, GASTROSCOPY, DUODENOSCOPY (EGD), COMBINED N/A 2015    Procedure: COMBINED ESOPHAGOSCOPY, GASTROSCOPY, DUODENOSCOPY (EGD), BIOPSY SINGLE OR MULTIPLE;  Surgeon: Sky Davey MD;  Location:  GI     EYE SURGERY       LAPAROSCOPY, SURGICAL; REPAIR INCISIONAL OR VENTRAL HERNIA       ORTHOPEDIC SURGERY       HERMINIA EN Y BOWEL       WRIST SURGERY       Social History     Social History     Marital status:      Spouse name: Rahat     Number of children: 2     Years of education: N/A     Occupational History           part-time     Social History Main Topics     Smoking status: Former Smoker     Smokeless tobacco: Never Used     Alcohol use No     Drug use: No     Sexual activity: Yes     Partners: Male      Birth control/ protection: None      Comment: 1 partner     Other Topics Concern     Not on file     Social History Narrative     Family History   Problem Relation Age of Onset     MENTAL ILLNESS Other      family hx     HEART DISEASE Other      DIABETES Other      CANCER Other      Genetic Disorder Father      Hyperlipidemia Mother      Glaucoma No family hx of      Macular Degeneration No family hx of      Hypertension No family hx of      Lab Results   Component Value Date    A1C 6.4 01/19/2018      Lab Results   Component Value Date    WBC 4.0 03/12/2018     Lab Results   Component Value Date    RBC 5.17 03/12/2018     Lab Results   Component Value Date    HGB 13.8 03/12/2018     Lab Results   Component Value Date    HCT 44.9 03/12/2018     No components found for: MCT  Lab Results   Component Value Date    MCV 87 03/12/2018     Lab Results   Component Value Date    MCH 26.7 03/12/2018     Lab Results   Component Value Date    MCHC 30.7 03/12/2018     Lab Results   Component Value Date    RDW 14.5 03/12/2018     Lab Results   Component Value Date     03/12/2018     Last Basic Metabolic Panel:  Lab Results   Component Value Date     03/12/2018      Lab Results   Component Value Date    POTASSIUM 3.7 03/12/2018     Lab Results   Component Value Date    CHLORIDE 105 03/12/2018     Lab Results   Component Value Date    MARY 9.3 03/12/2018     Lab Results   Component Value Date    CO2 22 03/12/2018     Lab Results   Component Value Date    BUN 14 03/12/2018     Lab Results   Component Value Date    CR 0.90 03/12/2018     Lab Results   Component Value Date     03/12/2018     SUBJECTIVE FINDINGS:  A 62-year-old female returns to clinic for onychauxis and diabetic foot check.  She is diabetic with peripheral neuropathy.  Relates numbness and tingling is not bad in her feet.  No ulcers or sores since I have seen her last.  She relates she is wearing her diabetic shoes and needs new ones.        OBJECTIVE FINDINGS:  DP and PT are 2/4 bilaterally.  CFTs are less than 3 seconds bilaterally.  She has incurvated nails with some dystrophy and subungual debris bilaterally 1-5.  She has dorsally contracted digits 1-5 bilaterally.  She has decreased ankle joint dorsiflexion bilaterally.  There is no erythema, no drainage, no odor, no calor bilaterally.  Sharp/dull is intact with 5.07 [employer] [place] [employer] monofilament bilaterally.  Deep tendon reflexes intact bilaterally.  There is some dystrophy of the nails as well.         ASSESSMENT AND PLAN:  Onychauxis bilaterally.  She is diabetic with peripheral neuropathy.  [place].  Diagnosis and treatment options were discussed with the patient.  All of the nails were reduced bilaterally upon consent.  Prescription for diabetic shoes given and return to clinic and see me in about 3 months.

## 2018-03-19 NOTE — LETTER
3/19/2018         RE: Elizabeth Palma  [address]   [facility] [address]        Dear Colleague,    Thank you for referring your patient, Elizabeth Palma, to the [facility]. Please see a copy of my visit note below.    Past Medical History:   Diagnosis Date     Abnormal MRI, cervical spine 10/15/2011    2011; mild changes noted. Study done for left arm symptoms Impression:  1. Mild multilevel degenerative disc disease with no significant canal or neural stenosis seen. motion artifact on the STIR images in these are not interpretable. The remaining images were interpreted      Autosomal dominant polycystic kidney disease 2011     (Problem list name updated by automated process. Provider to review and confirm.)     CMC DJD(carpometacarpal degenerative joint disease), localized primary 3/5/2013     -donor kidney transplant 3/20/2014     Depressive disorder 11/15/2012     DM type 2 (diabetes mellitus, type 2) (H) 2013     Encounter for long-term (current) use of other medications 2015     Family history of tremor 10/17/2011     Generalized anxiety disorder 11/15/2012     Glaucoma      Hyperlipidemia 10/15/2011     Hyperparathyroidism, secondary (H) 2015     Hypertension     resolved     Immunosuppressed status (H) 3/20/2014     Major depressive disorder, recurrent episode, moderate (H) 11/15/2012     Obesity (BMI 30-39.9)      OP (osteoporosis) T score -3.8 2009 T-score -3.7      LION (obstructive sleep apnoea) 10/15/2012    intol to cpa     Pain in joint, forearm -- L unhealed Fx 2013     Premature menopause age 37 7/10/2012    OCP (vaginal bldg)-->HT which she stopped 2 mo later documented at 2007 visit (age 51).      Restless leg syndrome      Rib fractures 2013     Sensory loss 10/17/2011    Bottom of feet; uncertain if there is a neuropathy per notes.       Stiffness of joint, not elsewhere classified, hand 3/5/2013      Tremor 10/15/2011    head     Uncomplicated asthma      Patient Active Problem List   Diagnosis     Autosomal dominant polycystic kidney disease     Hypertension     Hyperlipidemia     Abnormal MRI, cervical spine     Sensory loss     Premature menopause age 37     OP (osteoporosis) T score -3.8     LION on CPAP     Major depressive disorder, recurrent episode, moderate (H)     Generalized anxiety disorder     CMC DJD(carpometacarpal degenerative joint disease), localized primary     Pain in joint, forearm -- L unhealed Fx     -donor kidney transplant     Immunosuppressed status (H)     Hyperparathyroidism, secondary (H)     Hyperparathyroidism (H)     Senile osteoporosis     Pain in joint involving ankle and foot     Nightmares associated with chronic post-traumatic stress disorder     Type 2 diabetes mellitus with peripheral neuropathy (H)     Posttraumatic stress disorder     Right knee pain     Left knee pain     Age-related osteoporosis without current pathological fracture     Narcissistic personality disorder     Past Surgical History:   Procedure Laterality Date     ABDOMEN SURGERY       ANKLE SURGERY       C TRANSPLANTATION OF KIDNEY  3/2014     C/SECTION, LOW TRANSVERSE      x 2     CHOLECYSTECTOMY       COLONOSCOPY       ESOPHAGOSCOPY, GASTROSCOPY, DUODENOSCOPY (EGD), COMBINED N/A 2015    Procedure: COMBINED ESOPHAGOSCOPY, GASTROSCOPY, DUODENOSCOPY (EGD);  Surgeon: Sky Davey MD;  Location:  GI     ESOPHAGOSCOPY, GASTROSCOPY, DUODENOSCOPY (EGD), COMBINED N/A 2015    Procedure: COMBINED ESOPHAGOSCOPY, GASTROSCOPY, DUODENOSCOPY (EGD), BIOPSY SINGLE OR MULTIPLE;  Surgeon: Sky Davey MD;  Location:  GI     EYE SURGERY       LAPAROSCOPY, SURGICAL; REPAIR INCISIONAL OR VENTRAL HERNIA       ORTHOPEDIC SURGERY       HERMINIA EN Y BOWEL       WRIST SURGERY       Social History     Social History     Marital status:      Spouse name: Rahat     Danie tovar  children: 2     Years of education: N/A     Occupational History           part-time     Social History Main Topics     Smoking status: Former Smoker     Smokeless tobacco: Never Used     Alcohol use No     Drug use: No     Sexual activity: Yes     Partners: Male     Birth control/ protection: None      Comment: 1 partner     Other Topics Concern     Not on file     Social History Narrative     Family History   Problem Relation Age of Onset     MENTAL ILLNESS Other      family hx     HEART DISEASE Other      DIABETES Other      CANCER Other      Genetic Disorder Father      Hyperlipidemia Mother      Glaucoma No family hx of      Macular Degeneration No family hx of      Hypertension No family hx of      Lab Results   Component Value Date    A1C 6.4 01/19/2018      Lab Results   Component Value Date    WBC 4.0 03/12/2018     Lab Results   Component Value Date    RBC 5.17 03/12/2018     Lab Results   Component Value Date    HGB 13.8 03/12/2018     Lab Results   Component Value Date    HCT 44.9 03/12/2018     No components found for: MCT  Lab Results   Component Value Date    MCV 87 03/12/2018     Lab Results   Component Value Date    MCH 26.7 03/12/2018     Lab Results   Component Value Date    MCHC 30.7 03/12/2018     Lab Results   Component Value Date    RDW 14.5 03/12/2018     Lab Results   Component Value Date     03/12/2018     Last Basic Metabolic Panel:  Lab Results   Component Value Date     03/12/2018      Lab Results   Component Value Date    POTASSIUM 3.7 03/12/2018     Lab Results   Component Value Date    CHLORIDE 105 03/12/2018     Lab Results   Component Value Date    MARY 9.3 03/12/2018     Lab Results   Component Value Date    CO2 22 03/12/2018     Lab Results   Component Value Date    BUN 14 03/12/2018     Lab Results   Component Value Date    CR 0.90 03/12/2018     Lab Results   Component Value Date     03/12/2018     SUBJECTIVE FINDINGS:  A 62-year-old female returns to clinic  for onychauxis and diabetic foot check.  She is diabetic with peripheral neuropathy.  Relates numbness and tingling is not bad in her feet.  No ulcers or sores since I have seen her last.  She relates she is wearing her diabetic shoes and needs new ones.       OBJECTIVE FINDINGS:  DP and PT are 2/4 bilaterally.  CFTs are less than 3 seconds bilaterally.  She has incurvated nails with some dystrophy and subungual debris bilaterally 1-5.  She has dorsally contracted digits 1-5 bilaterally.  She has decreased ankle joint dorsiflexion bilaterally.  There is no erythema, no drainage, no odor, no calor bilaterally.  Sharp/dull is intact with 5.07 [place] [employer] monofilament bilaterally.   Deep tendon reflexes intact bilaterally.  There is some dystrophy of the nails as well.         ASSESSMENT AND PLAN:  Onychauxis bilaterally.  She is diabetic with peripheral neuropathy.  [place].  Diagnosis and treatment options were discussed with the patient.  All of the nails were reduced bilaterally upon consent.  Prescription for diabetic shoes given and return to clinic and see me in about 3 months.    Again, thank you for allowing me to participate in the care of your patient.        Sincerely,        Javier Tuttle DPM

## 2018-03-19 NOTE — PATIENT INSTRUCTIONS
Thanks for coming today.  [facility]  [address] [place] [address]    To schedule future appointments in Ortho Clinic, you may call [phone].    To schedule ordered imaging by your Provider: Call [facility] at [phone]    [email or web address] available online at:   [email or web address]    Please call if any further questions or concerns [phone] and ask for the Orthopedic Department. Clinic hours 8 am to 5 pm.    Return to clinic if symptoms worsen.

## 2018-03-19 NOTE — MR AVS SNAPSHOT
After Visit Summary   3/19/2018    Elizabeth Palma    MRN: [id number]           Patient Information     Date Of Birth          1955        Visit Information        Provider Department      3/19/2018 2:30 PM Javier Tuttle DPM [facility]        Today's Diagnoses     Onychauxis    -  1    Type II or unspecified type diabetes mellitus with neurological manifestations, not stated as uncontrolled(250.60) (H)        Hammertoes of both feet          Care Instructions    Thanks for coming today.  [facility]  [address] [place] [address]    To schedule future appointments in Ortho Clinic, you may call [phone].    To schedule ordered imaging by your Provider: Call [facility] at [phone]    [employer] available online at:   TNC/Pop[employer]    Please call if any further questions or concerns [phone] and ask for the Orthopedic Department. Clinic hours 8 am to 5 pm.    Return to clinic if symptoms worsen.            Follow-ups after your visit        Additional Services     ORTHOTICS REFERRAL       *This referral order prints off in the [facility]  (Orthotics & Prosthetics) Central Scheduling Office**    The [facility] Central Scheduling Staff will contact the patient to schedule appointments.     Central Scheduling Contact Information: [phone] ([place])    Diabetic shoes.  Custom Diabetic foot orthotics, 3 pair.    Please be aware that coverage of these services is subject to the terms and limitations of your health insurance plan.  Call member services at your health plan with any benefit or coverage questions.      Please bring the following to your appointment:    >>   Any x-rays, CTs or MRIs which have been performed.  Contact the facility where they were done to arrange for  prior to your scheduled appointment.    >>   List of current medications   >>   This referral request   >>   Any  documents/labs given to you for this referral                  Your next 10 appointments already scheduled     Mar 19, 2018  2:30 PM CDT   [facility] Visit with Javier Tuttle DPM   [facility]    [address] [place] [id number] [phone]            Mar 27, 2018  1:00 PM CDT   Adult Med Follow UP with Chaparrita Hatch MD   [facility] ([facility])    [address] [place] [id number] [phone]            Apr 16, 2018 10:30 AM CDT   (Arrive by 10:15 AM)   Return Visit with Horacio Sheridan MD   [facility] ([facility])    [address] [place] [id number] [phone]            Apr 25, 2018  8:45 AM CDT   RETURN GENERAL with Yonas Underwood MD   [facility] ([facility])    [address] [id number] [phone]            Apr 26, 2018  9:30 AM CDT   (Arrive by 9:15 AM)   NUTRITION VISIT with Francesca Danielle RD   [facility] ([facility])    [address] [place] [id number] [phone]            May 07, 2018  2:00 PM CDT   (Arrive by 1:45 PM)   RETURN ENDOCRINE with Lizbet Byers MD   [facility] ([facility])    [address] [place] [address] [id number] [phone]            Jun 18, 2018  1:30 PM CDT   Return Visit with Javier Tuttle DPM   [facility] ([facility])    [address] [place] [address] [id number] [phone]            Jul 02, 2018 10:45 AM CDT   (Arrive by 10:30 AM)   Return Visit with Prakash Irene MD   [facility] ([facility])    [address] [place] [phone]    [phone]            Mar 18, 2019 12:45 PM CDT   (Arrive by 12:15 PM)   Return Kidney Transplant with Christian Jimenez MD   [facility] ([facility])    [address]  [address]  [facility] [address] [phone]              Who to contact     If you have questions or need follow up information about today's clinic visit or your schedule please contact [facility] directly at [phone].  Normal or non-critical lab and imaging results will be communicated to you by HazelMailhart, letter or phone within 4 business days after the clinic has received the results. If you do not hear from us within 7 days, please contact the clinic through DoctorBaset or phone. If you have a critical or abnormal lab result, we will notify you by phone as soon as possible.  Submit refill requests through Drive [employer] or call your pharmacy and they will forward the refill request to us. Please allow 3 business days for your refill to be completed.          Additional Information About Your Visit        HazelMailharMirifice Information     [employer] gives you secure access to your electronic health record. If you see a primary care provider, you can also send messages to your care team and make appointments. If you have questions, please call your primary care clinic.  If you do not have a primary care provider, please call [phone] and they will assist you.      Drive Power is an electronic gateway that provides easy, online access to your medical records. With [employer], you can request a clinic appointment, read your test results, renew a prescription or communicate with your care team.     To access your existing account, please contact your [facility] or call [phone] for assistance.        Care EveryWhere ID     This is your Care EveryWhere ID. This could be used by other organizations to access your [place] medical records  [id number]        Your Vitals Were      Pulse Pulse Oximetry                79 98%           Blood Pressure from Last 3 Encounters:   03/19/18 124/78   03/12/18 124/76   03/12/18 122/78    Weight from Last 3 Encounters:   03/16/18 71.9 kg (158 lb 8 oz)   03/12/18 70.7 kg (155 lb 12.8 oz)   02/16/18 72 kg (158 lb 11.2 oz)              We Performed the Following     ORTHOTICS REFERRAL     TRIM NONDYSTRPHIC NAIL(S)          Today's Medication Changes          These changes are accurate as of 3/19/18  2:28 PM.  If you have any questions, ask your nurse or doctor.               These medicines have changed or have updated prescriptions.        Dose/Directions    cyanocobalamin 1000 MCG tablet   Commonly known as:  vitamin  B-12   This may have changed:  when to take this   Used for:  CKD (chronic kidney disease) stage 5, GFR less than 15 ml/min (H)        Dose:  1000 mcg   Take 1 tablet by mouth daily.   Quantity:  30 tablet   Refills:  0                Primary Care Provider Office Phone # Fax #    Horacio Sheridan [phone]       [address] [place] [address]        Equal Access to Services     [facility] AH: Hadii riky richter Somartín, waaxda lubrit, qaybta kaalmada lor, ty agrawal . So [facility] [phone].    ATENCIÓN: Si habla español, tiene a goetz disposición servicios gratuitos de asistencia lingüística. [facility] [phone].    We comply with applicable federal civil rights laws and [place] laws. We do not discriminate on the basis of race, color, national origin, age, disability, sex, sexual orientation, or gender identity.            Thank you!     Thank you for choosing [facility]  for your care. Our goal is always to provide you with excellent care. Hearing back from our patients is one way we can continue to improve our services. Please take a few minutes to complete the written survey that you may receive in the mail after your visit with us. Thank you!              Your Updated Medication List - Protect others around you: Learn how to safely use, store and throw away your medicines at www.disposemymeds.org.          This list is accurate as of 3/19/18  2:28 PM.  Always use your most recent med list.                   Brand Name Dispense Instructions for use Diagnosis    acetylcysteine 600 MG Caps capsule    N-ACETYL CYSTEINE    30 capsule    Take 2 400 mg caps two times daily for total daily dose of 800 mg        albuterol 108 (90 BASE) MCG/ACT Inhaler    PROAIR HFA/PROVENTIL HFA/VENTOLIN HFA    1 Inhaler    Inhale 2 puffs into the lungs every 6 hours as needed for shortness of breath / dyspnea or wheezing    Exercise-induced asthma       ARIPiprazole 2 MG tablet    ABILIFY    15 tablet    Take 0.5 tablets (1 mg) by mouth daily    Major depressive disorder, recurrent episode, moderate (H)       aspirin 81 MG EC tablet     30 tablet    Take 1 tablet (81 mg) by mouth daily    Kidney replaced by transplant       BENADRYL 25 MG capsule   Generic drug:  diphenhydrAMINE      Take 25 mg by mouth At Bedtime.        blood glucose monitoring lancets     1 Box    Use to test blood sugar 2 times daily or as directed.    Type 2 diabetes mellitus with peripheral neuropathy (H)       * blood glucose monitoring meter device kit    no brand specified    1 kit    Use to test blood sugar 2 times daily or as directed.    Type 2 diabetes mellitus with peripheral neuropathy (H)       * blood glucose monitoring meter device kit           blood glucose monitoring test strip    no brand specified    100 strip    Use to test blood sugars 2 times daily or as directed    Type 2 diabetes mellitus with peripheral neuropathy (H)       cyanocobalamin 1000 MCG tablet    vitamin  B-12    30 tablet    Take 1 tablet by mouth daily.    CKD (chronic kidney disease) stage 5, GFR less than 15 ml/min (H)       cycloSPORINE modified 25 MG capsule    GENERIC EQUIVALENT    300 capsule    Take 5 capsules (125  mg) by mouth 2 times daily    Kidney replaced by transplant       econazole nitrate 1 % cream     85 g    Apply topically daily    Type II or unspecified type diabetes mellitus with neurological manifestations, not stated as uncontrolled(250.60) (H), Dermatophytosis of foot       furosemide 20 MG tablet    LASIX    90 tablet    Take 1 tablet (20 mg) by mouth daily    Generalized edema       latanoprost 0.005 % ophthalmic solution    XALATAN    2.5 mL    Place 1 drop into both eyes At Bedtime    Mild stage glaucoma(365.71)       metFORMIN 500 MG 24 hr tablet    GLUCOPHAGE-XR    90 tablet    Take 3 tablets (1,500 mg) by mouth daily (with dinner)    Type 2 diabetes mellitus with diabetic polyneuropathy, without long-term current use of insulin (H)       mycophenolate 250 MG capsule    GENERIC EQUIVALENT    240 capsule    Take 4 capsules (1,000 mg) by mouth 2 times daily    Kidney transplanted       omeprazole 40 MG capsule    priLOSEC    90 capsule    Take 1 capsule (40 mg) by mouth daily    Gastroesophageal reflux disease without esophagitis       ondansetron 4 MG ODT tab    ZOFRAN-ODT    20 tablet    Take 1 tablet (4 mg) by mouth every 6 hours as needed    Chronic kidney disease, stage V (H)       order for DME     1 Units    Walker with front wheels and a seat.    Fall, initial encounter       prazosin 5 MG capsule    MINIPRESS    90 capsule    Take 3 capsules (15 mg) by mouth At Bedtime    Nightmares associated with chronic post-traumatic stress disorder       REFRESH OP      Apply to eye as needed Both eyes        replens Gel     35 g    Use vaginally as needed. Can use up to 3 times per week.    Vaginal dryness       simvastatin 20 MG tablet    ZOCOR    90 tablet    Take 1 tablet (20 mg) by mouth At Bedtime    Chronic kidney disease, stage V (H)       sulfamethoxazole-trimethoprim 400-80 MG per tablet    BACTRIM/SEPTRA    90 tablet    Take 1 tablet by mouth daily    Immunosuppression (H)       topiramate 200 MG  tablet    TOPAMAX    60 tablet    Take 1 tablet (200 mg) by mouth 2 times daily    Morbid obesity due to excess calories (H)       TYLENOL 325 MG tablet   Generic drug:  acetaminophen      Take 325-650 mg by mouth as needed        vilazodone 40 MG Tabs tablet    VIIBRYD    30 tablet    Take 1 tablet (40 mg) by mouth daily    Major depressive disorder, recurrent episode, moderate (H)       vitamin D 1000 UNITS capsule     30 capsule    2,000 Units        * Notice:  This list has 2 medication(s) that are the same as other medications prescribed for you. Read the directions carefully, and ask your doctor or other care provider to review them with you.

## 2018-03-19 NOTE — NURSING NOTE
"Elizabeth Palma's goals for this visit include: diabetic foot check   She requests these members of her care team be copied on today's visit information: yes    PCP: Horacio Sheridan    Referring Provider:  Referred Self, MD  No address on file    Chief Complaint   Patient presents with     RECHECK     Diabetic foot check        Initial /78  Pulse 79  SpO2 98% Estimated body mass index is 29.95 kg/(m^2) as calculated from the following:    Height as of 3/12/18: 1.549 m (5' 1\").    Weight as of 3/16/18: 71.9 kg (158 lb 8 oz).  Medication Reconciliation: complete    "

## 2018-03-23 ENCOUNTER — TRANSFERRED RECORDS (OUTPATIENT)
Dept: HEALTH INFORMATION MANAGEMENT | Facility: CLINIC | Age: 63
End: 2018-03-23

## 2018-03-27 ENCOUNTER — OFFICE VISIT (OUTPATIENT)
Dept: PSYCHIATRY | Facility: CLINIC | Age: 63
End: 2018-03-27
Payer: MEDICARE

## 2018-03-27 VITALS
DIASTOLIC BLOOD PRESSURE: 78 MMHG | BODY MASS INDEX: 29.68 KG/M2 | HEART RATE: 67 BPM | HEIGHT: 61 IN | TEMPERATURE: 98 F | WEIGHT: 157.2 LBS | SYSTOLIC BLOOD PRESSURE: 137 MMHG

## 2018-03-27 DIAGNOSIS — F60.81 NARCISSISTIC PERSONALITY DISORDER (H): ICD-10-CM

## 2018-03-27 DIAGNOSIS — Z94.0 KIDNEY REPLACED BY TRANSPLANT: ICD-10-CM

## 2018-03-27 DIAGNOSIS — F41.1 GENERALIZED ANXIETY DISORDER: ICD-10-CM

## 2018-03-27 DIAGNOSIS — F33.1 MAJOR DEPRESSIVE DISORDER, RECURRENT EPISODE, MODERATE (H): Primary | ICD-10-CM

## 2018-03-27 DIAGNOSIS — F51.5 NIGHTMARES ASSOCIATED WITH CHRONIC POST-TRAUMATIC STRESS DISORDER: ICD-10-CM

## 2018-03-27 DIAGNOSIS — F43.12 NIGHTMARES ASSOCIATED WITH CHRONIC POST-TRAUMATIC STRESS DISORDER: ICD-10-CM

## 2018-03-27 ASSESSMENT — PAIN SCALES - GENERAL: PAINLEVEL: SEVERE PAIN (6)
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
## 2018-03-27 NOTE — MR AVS SNAPSHOT
After Visit Summary   3/27/2018    Elizabeth Palma    MRN: [id number]           Patient Information     Date Of Birth          1955        Visit Information        Provider Department      3/27/2018 1:00 PM Chaparrita Hatch MD [facility]        Today's Diagnoses     Major depressive disorder, recurrent episode, moderate (H)    -  1    Narcissistic personality disorder           Follow-ups after your visit        Your next 10 appointments already scheduled     Apr 16, 2018 10:30 AM CDT   (Arrive by 10:15 AM)   Return Visit with Horacio Sheridan MD   [facility] ([facility])    [address] [facility] [address]   [phone]            Apr 25, 2018  9:45 AM CDT   RETURN GENERAL with Yonas Underwood MD   [facility] ([facility])    [address] [address]   [phone]            Apr 26, 2018  9:30 AM CDT   (Arrive by 9:15 AM)   NUTRITION VISIT with Francesca Danielle RD   [facility] ([facility] [place])    [address]  [address] [facility] [address]   [phone]            May 01, 2018  1:00 PM CDT   Adult Med Follow UP with Chaparrita Hatch MD   [facility] ([facility])    [address]  [facility] [address]   [phone]            May 07, 2018  2:00 PM CDT   (Arrive by 1:45 PM)   RETURN ENDOCRINE with Lizbet Byers MD   [facility] ([facility] [place])    [address]  [address] [facility] [address]   [phone]            Jun 18, 2018  1:30 PM CDT   [facility]    [address] [place] [address]   [phone]            Jul 02, 2018 10:45 AM CDT   (Arrive by 10:30 AM)  "  Return Visit with Prakash Irene MD   [facility] ([facility])    [address]  [address]  [facility] [id number] [phone]            Mar 18, 2019 12:45 PM CDT   (Arrive by 12:15 PM)   Return Kidney Transplant with Christian Jimenez MD   [facility] Nephrology ([facility])    [address]  [address]  [facility] [address] [phone]              Who to contact     Please call your clinic at [phone] to:    Ask questions about your health    Make or cancel appointments    Discuss your medicines    Learn about your test results    Speak to your doctor            Additional Information About Your Visit        [employer] Information     [employer] gives you secure access to your electronic health record. If you see a primary care provider, you can also send messages to your care team and make appointments. If you have questions, please call your primary care clinic.  If you do not have a primary care provider, please call [phone] and they will assist you.      [employer] is an electronic gateway that provides easy, online access to your medical records. With [employer], you can request a clinic appointment, read your test results, renew a prescription or communicate with your care team.     To access your existing account, please contact your [facility] or call [phone] for assistance.        Care EveryWhere ID     This is your Care EveryWhere ID. This could be used by other organizations to access your [place] medical records  [id number]        Your Vitals Were     Pulse Temperature Height BMI (Body Mass Index)          67 98  F (36.7  C) (Temporal) 5' 1\" (154.9 cm) 29.7 kg/m2         Blood Pressure from Last 3 Encounters:   03/27/18 137/78   03/19/18 124/78   03/12/18 124/76    Weight from Last 3 Encounters:   03/27/18 157 lb 3.2 oz (71.3 kg)   03/16/18 158 lb 8 oz (71.9 " kg)   03/12/18 155 lb 12.8 oz (70.7 kg)              Today, you had the following     No orders found for display         Today's Medication Changes          These changes are accurate as of 3/27/18  1:40 PM.  If you have any questions, ask your nurse or doctor.               These medicines have changed or have updated prescriptions.        Dose/Directions    cyanocobalamin 1000 MCG tablet   Commonly known as:  vitamin  B-12   This may have changed:  when to take this   Used for:  CKD (chronic kidney disease) stage 5, GFR less than 15 ml/min (H)        Dose:  1000 mcg   Take 1 tablet by mouth daily.   Quantity:  30 tablet   Refills:  0                Primary Care Provider Office Phone # Fax #    Horacio Sheridan [phone]       [address] [place] [address]        Equal Access to Services     LINNEA HIDALGO : Jennifer Quevedo, wakeri elizabethqrasheeda, qaybta kaalmajuly horowitz, ty agrawal . So [facility] [phone].    ATENCIÓN: Si habla español, tiene a goetz disposición servicios gratuitos de asistencia lingüística. Rachel al [phone].    We comply with applicable federal civil rights laws and [place] laws. We do not discriminate on the basis of race, color, national origin, age, disability, sex, sexual orientation, or gender identity.            Thank you!     Thank you for choosing [facility]  for your care. Our goal is always to provide you with excellent care. Hearing back from our patients is one way we can continue to improve our services. Please take a few minutes to complete the written survey that you may receive in the mail after your visit with us. Thank you!             Your Updated Medication List - Protect others around you: Learn how to safely use, store and throw away your medicines at www.disposemymeds.org.          This list is accurate as of 3/27/18  1:40 PM.  Always use your most recent med list.                   Brand Name  Dispense Instructions for use Diagnosis    acetylcysteine 600 MG Caps capsule    N-ACETYL CYSTEINE    30 capsule    Take 2 400 mg caps two times daily for total daily dose of 800 mg        albuterol 108 (90 BASE) MCG/ACT Inhaler    PROAIR HFA/PROVENTIL HFA/VENTOLIN HFA    1 Inhaler    Inhale 2 puffs into the lungs every 6 hours as needed for shortness of breath / dyspnea or wheezing    Exercise-induced asthma       ARIPiprazole 2 MG tablet    ABILIFY    15 tablet    Take 0.5 tablets (1 mg) by mouth daily    Major depressive disorder, recurrent episode, moderate (H)       aspirin 81 MG EC tablet     30 tablet    Take 1 tablet (81 mg) by mouth daily    Kidney replaced by transplant       BENADRYL 25 MG capsule   Generic drug:  diphenhydrAMINE      Take 25 mg by mouth At Bedtime.        blood glucose monitoring lancets     1 Box    Use to test blood sugar 2 times daily or as directed.    Type 2 diabetes mellitus with peripheral neuropathy (H)       * blood glucose monitoring meter device kit    no brand specified    1 kit    Use to test blood sugar 2 times daily or as directed.    Type 2 diabetes mellitus with peripheral neuropathy (H)       * blood glucose monitoring meter device kit           blood glucose monitoring test strip    no brand specified    100 strip    Use to test blood sugars 2 times daily or as directed    Type 2 diabetes mellitus with peripheral neuropathy (H)       cyanocobalamin 1000 MCG tablet    vitamin  B-12    30 tablet    Take 1 tablet by mouth daily.    CKD (chronic kidney disease) stage 5, GFR less than 15 ml/min (H)       cycloSPORINE modified 25 MG capsule    GENERIC EQUIVALENT    300 capsule    Take 5 capsules (125 mg) by mouth 2 times daily    Kidney replaced by transplant       econazole nitrate 1 % cream     85 g    Apply topically daily    Type II or unspecified type diabetes mellitus with neurological manifestations, not stated as uncontrolled(250.60) (H), Dermatophytosis of foot        furosemide 20 MG tablet    LASIX    90 tablet    Take 1 tablet (20 mg) by mouth daily    Generalized edema       latanoprost 0.005 % ophthalmic solution    XALATAN    2.5 mL    Place 1 drop into both eyes At Bedtime    Mild stage glaucoma(365.71)       metFORMIN 500 MG 24 hr tablet    GLUCOPHAGE-XR    90 tablet    Take 3 tablets (1,500 mg) by mouth daily (with dinner)    Type 2 diabetes mellitus with diabetic polyneuropathy, without long-term current use of insulin (H)       mycophenolate 250 MG capsule    GENERIC EQUIVALENT    240 capsule    Take 4 capsules (1,000 mg) by mouth 2 times daily    Kidney transplanted       omeprazole 40 MG capsule    priLOSEC    90 capsule    Take 1 capsule (40 mg) by mouth daily    Gastroesophageal reflux disease without esophagitis       ondansetron 4 MG ODT tab    ZOFRAN-ODT    20 tablet    Take 1 tablet (4 mg) by mouth every 6 hours as needed    Chronic kidney disease, stage V (H)       order for DME     1 Units    Walker with front wheels and a seat.    Fall, initial encounter       prazosin 5 MG capsule    MINIPRESS    90 capsule    Take 3 capsules (15 mg) by mouth At Bedtime    Nightmares associated with chronic post-traumatic stress disorder       REFRESH OP      Apply to eye as needed Both eyes        replens Gel     35 g    Use vaginally as needed. Can use up to 3 times per week.    Vaginal dryness       simvastatin 20 MG tablet    ZOCOR    90 tablet    Take 1 tablet (20 mg) by mouth At Bedtime    Chronic kidney disease, stage V (H)       sulfamethoxazole-trimethoprim 400-80 MG per tablet    BACTRIM/SEPTRA    90 tablet    Take 1 tablet by mouth daily    Immunosuppression (H)       topiramate 200 MG tablet    TOPAMAX    60 tablet    Take 1 tablet (200 mg) by mouth 2 times daily    Morbid obesity due to excess calories (H)       TYLENOL 325 MG tablet   Generic drug:  acetaminophen      Take 325-650 mg by mouth as needed        vilazodone 40 MG Tabs tablet    VIIBRYD    30  tablet    Take 1 tablet (40 mg) by mouth daily    Major depressive disorder, recurrent episode, moderate (H)       vitamin D 1000 UNITS capsule     30 capsule    2,000 Units        * Notice:  This list has 2 medication(s) that are the same as other medications prescribed for you. Read the directions carefully, and ask your doctor or other care provider to review them with you.

## 2018-03-28 RX ORDER — CYCLOSPORINE 25 MG/1
CAPSULE, LIQUID FILLED ORAL
Qty: 300 CAPSULE | Refills: 6 | Status: SHIPPED | OUTPATIENT
Start: 2018-03-28 | End: 2018-10-18

## 2018-03-29 ASSESSMENT — PATIENT HEALTH QUESTIONNAIRE - PHQ9: SUM OF ALL RESPONSES TO PHQ QUESTIONS 1-9: 6

## 2018-04-11 NOTE — TELEPHONE ENCOUNTER
Last seen: 3/27/18  RTC: 1 month   Cancel: none  No-show: none  Next appt: 5/1/18    Incoming refill from [facility] via fax    Medication requested: Abilify 2 mg   Directions: Take 1/2 tablet by mouth daily   Qty: 15  Last refilled: 3/8/18

## 2018-04-11 NOTE — TELEPHONE ENCOUNTER
-Upon review of chart, writer found that Dr. Hatch had sent four months of Abilify to [facility] in [place], [place].  Called [facility] to make sure they had this order, they will fill for patient.   -Writer then called Elizabeth and informed her that her Abilify was sent to [facility] in [place], [place] on 2/27/18 as a four month supply.  Elizabeth did not know that her  had last filled her Abilify at [facility].  Elizabeth ultimately decided that she would pick it up at [facility].  -No refills needed at this time.

## 2018-04-16 ENCOUNTER — OFFICE VISIT (OUTPATIENT)
Dept: INTERNAL MEDICINE | Facility: CLINIC | Age: 63
End: 2018-04-16
Payer: MEDICARE

## 2018-04-16 VITALS
DIASTOLIC BLOOD PRESSURE: 81 MMHG | SYSTOLIC BLOOD PRESSURE: 115 MMHG | RESPIRATION RATE: 16 BRPM | HEART RATE: 84 BPM | WEIGHT: 157.3 LBS | BODY MASS INDEX: 29.72 KG/M2

## 2018-04-16 DIAGNOSIS — Z12.4 SCREENING FOR MALIGNANT NEOPLASM OF CERVIX: ICD-10-CM

## 2018-04-16 DIAGNOSIS — N18.5 CHRONIC KIDNEY DISEASE, STAGE V (H): ICD-10-CM

## 2018-04-16 DIAGNOSIS — R60.1 GENERALIZED EDEMA: ICD-10-CM

## 2018-04-16 DIAGNOSIS — E78.5 HYPERLIPIDEMIA, UNSPECIFIED HYPERLIPIDEMIA TYPE: Primary | ICD-10-CM

## 2018-04-16 DIAGNOSIS — K21.9 GASTROESOPHAGEAL REFLUX DISEASE WITHOUT ESOPHAGITIS: ICD-10-CM

## 2018-04-16 RX ORDER — SIMVASTATIN 20 MG
20 TABLET ORAL AT BEDTIME
Qty: 90 TABLET | Refills: 3 | Status: SHIPPED | OUTPATIENT
Start: 2018-04-16 | End: 2019-01-28

## 2018-04-16 RX ORDER — FUROSEMIDE 20 MG
20 TABLET ORAL DAILY
Qty: 90 TABLET | Refills: 3 | Status: SHIPPED | OUTPATIENT
Start: 2018-04-16 | End: 2019-05-06

## 2018-04-16 RX ORDER — OMEPRAZOLE 40 MG/1
40 CAPSULE, DELAYED RELEASE ORAL DAILY
Qty: 90 CAPSULE | Refills: 3 | Status: SHIPPED | OUTPATIENT
Start: 2018-04-16 | End: 2019-04-19

## 2018-04-16 ASSESSMENT — PAIN SCALES - GENERAL: PAINLEVEL: MILD PAIN (2)

## 2018-04-16 NOTE — PATIENT INSTRUCTIONS
[facility]: [phone]     [facility] Medication Refill Request Information:  * Please contact your pharmacy regarding ANY request for medication refills.  ** [facility] Prescription Fax = [phone]  * Please allow 3 business days for routine medication refills.  * Please allow 5 business days for controlled substance medication refills.     [facility] Test Result notification information:  *You will be notified with in 7-10 days of your appointment day regarding the results of your test.  If you are on MyChart you will be notified as soon as the provider has reviewed the results and signed off on them.

## 2018-04-16 NOTE — MR AVS SNAPSHOT
After Visit Summary   4/16/2018    Elizabeth Palma    MRN: [id number]           Patient Information     Date Of Birth          1955        Visit Information        Provider Department      4/16/2018 10:30 AM Horacio Sheridan MD [facility]        Today's Diagnoses     Hyperlipidemia, unspecified hyperlipidemia type    -  1    Generalized edema        Gastroesophageal reflux disease without esophagitis        Chronic kidney disease, stage V (H)        Screening for malignant neoplasm of cervix          Care Instructions    Primary Care Center: [phone]     Primary Care Center Medication Refill Request Information:  * Please contact your pharmacy regarding ANY request for medication refills.  ** [facility] Prescription Fax = [phone]  * Please allow 3 business days for routine medication refills.  * Please allow 5 business days for controlled substance medication refills.     Primary Care Center Test Result notification information:  *You will be notified with in 7-10 days of your appointment day regarding the results of your test.  If you are on MyChart you will be notified as soon as the provider has reviewed the results and signed off on them.              Follow-ups after your visit        Additional Services     OB/GYN REFERRAL       Your provider has referred you to:  [facility]: [facility] [phone]   [email or web address]    Please be aware that coverage of these services is subject to the terms and limitations of your health insurance plan.  Call member services at your health plan with any benefit or coverage questions.      Please bring the following with you to your appointment:    (1) Any X-Rays, CTs or MRIs which have been performed.  Contact the facility where they were done to arrange for  prior to your scheduled appointment.   (2) List of current medications   (3) This referral  request   (4) Any documents/labs given to you for this referral                  Your next 10 appointments already scheduled     Apr 25, 2018  9:45 AM CDT   RETURN GENERAL with Yonas Underwood MD   Eye Clinic ([facility])    [address]  9[address] Clin [address]a  [facility] [address]   [phone]            Apr 26, 2018  9:00 AM CDT   LAB with  LAB   [facility] ([facility])    [address] [place] [address]  [address]st [facility] [id number] [phone]           Please do not eat 10-12 hours before your appointment if you are coming in fasting for labs on lipids, cholesterol, or glucose (sugar). This does not apply to pregnant women. Water, hot tea and black coffee (with nothing added) are okay. Do not drink other fluids, diet soda or chew gum.            Apr 26, 2018  9:30 AM CDT   (Arrive by 9:15 AM)   NUTRITION VISIT with Francesca Danielle RD   [facility] Surgical Weight Management ([facility] [place])    [address] [place] [address]  4th [facility] [id number] [phone]            May 01, 2018  1:00 PM CDT   Adult Med Follow UP with Chaparrita Hatch MD   [facility] Psychiatry ([facility])    [address] [place] [address]   [phone]            May 07, 2018  2:00 PM CDT   (Arrive by 1:45 PM)   RETURN ENDOCRINE with Lizbet Byers MD   [facility] Endocrinology ([facility] [place])    [address] [place] [address]  [address]rd [facility] [id number] [phone]            Jun 18, 2018  1:30 PM CDT   Return Visit with Javier Tuttle DPM   [facility]    [address] [place] [id number] [phone]            Jul 02, 2018 10:45 AM CDT   (Arrive by 10:30 AM)   Return Visit with Prakash Irene MD   [facility] Medical Weight Management ([facility] [place])    [facility]  [facility] [facility] [address]  4th Floor  [facility] [id number] [phone]            Mar 18, 2019 12:45 PM CDT   (Arrive by 12:15 PM)   Return Kidney Transplant with Christian Jimenez MD   [facility] Nephrology ([facility])    [address]  [address]  [facility] [id number] [phone]              Who to contact     Please call your clinic at [phone] to:    Ask questions about your health    Make or cancel appointments    Discuss your medicines    Learn about your test results    Speak to your doctor            Additional Information About Your Visit        [employer] Information     [employer] gives you secure access to your electronic health record. If you see a primary care provider, you can also send messages to your care team and make appointments. If you have questions, please call your primary care clinic.  If you do not have a primary care provider, please call [phone] and they will assist you.      [employer] is an electronic gateway that provides easy, online access to your medical records. With [employer], you can request a clinic appointment, read your test results, renew a prescription or communicate with your care team.     To access your existing account, please contact your [facility] or call [phone] for assistance.        Care EveryWhere ID     This is your Care EveryWhere ID. This could be used by other organizations to access your [facility] medical records  [id number]        Your Vitals Were     Pulse Respirations BMI (Body Mass Index)             84 16 29.72 kg/m2          Blood Pressure from Last 3 Encounters:   04/16/18 115/81   03/27/18 137/78   03/19/18 124/78    Weight from Last 3 Encounters:   04/16/18 71.4 kg (157 lb 4.8 oz)   03/27/18 71.3 kg (157 lb 3.2 oz)   03/16/18 71.9 kg (158 lb 8 oz)              We Performed the Following     OB/GYN REFERRAL          Today's Medication Changes          These changes are accurate  as of 4/16/18 11:59 PM.  If you have any questions, ask your nurse or doctor.               These medicines have changed or have updated prescriptions.        Dose/Directions    cyanocobalamin 1000 MCG tablet   Commonly known as:  vitamin  B-12   This may have changed:  when to take this   Used for:  CKD (chronic kidney disease) stage 5, GFR less than 15 ml/min (H)        Dose:  1000 mcg   Take 1 tablet by mouth daily.   Quantity:  30 tablet   Refills:  0            Where to get your medicines      These medications were sent to [facility] - [place], [place] - [address] [facility] [address]  [address], [facility] [address]     Phone:  [phone]     furosemide 20 MG tablet    omeprazole 40 MG capsule    simvastatin 20 MG tablet                Primary Care Provider Office Phone # Fax #    Horacio Sheridan [phone]       [address] [place] [address]  [facility] [address]        Equal Access to Services     LINNEA HIDALGO AH: Hadii riky ku hadasho Soomaali, waaxda luqadaha, qaybta kaalmada adeegyada, waxay idiin haymelanien jakob fam. So [facility] [phone].    ATENCIÓN: Si habla español, tiene a goetz disposición servicios gratuitos de asistencia lingüística. Llame al [phone].    We comply with applicable federal civil rights laws and [place] laws. We do not discriminate on the basis of race, color, national origin, age, disability, sex, sexual orientation, or gender identity.            Thank you!     Thank you for choosing [facility]  for your care. Our goal is always to provide you with excellent care. Hearing back from our patients is one way we can continue to improve our services. Please take a few minutes to complete the written survey that you may receive in the mail after your visit with us. Thank you!             Your Updated Medication List - Protect others around you: Learn how to safely use, store and throw away your medicines at  www.disposemymeds.org.          This list is accurate as of 4/16/18 11:59 PM.  Always use your most recent med list.                   Brand Name Dispense Instructions for use Diagnosis    acetylcysteine 600 MG Caps capsule    N-ACETYL CYSTEINE    30 capsule    Take 2 400 mg caps two times daily for total daily dose of 800 mg        albuterol 108 (90 Base) MCG/ACT Inhaler    PROAIR HFA/PROVENTIL HFA/VENTOLIN HFA    1 Inhaler    Inhale 2 puffs into the lungs every 6 hours as needed for shortness of breath / dyspnea or wheezing    Exercise-induced asthma       ARIPiprazole 2 MG tablet    ABILIFY    15 tablet    Take 0.5 tablets (1 mg) by mouth daily    Major depressive disorder, recurrent episode, moderate (H)       aspirin 81 MG EC tablet     30 tablet    Take 1 tablet (81 mg) by mouth daily    Kidney replaced by transplant       BENADRYL 25 MG capsule   Generic drug:  diphenhydrAMINE      Take 25 mg by mouth At Bedtime.        blood glucose monitoring lancets     1 Box    Use to test blood sugar 2 times daily or as directed.    Type 2 diabetes mellitus with peripheral neuropathy (H)       * blood glucose monitoring meter device kit    no brand specified    1 kit    Use to test blood sugar 2 times daily or as directed.    Type 2 diabetes mellitus with peripheral neuropathy (H)       * blood glucose monitoring meter device kit           blood glucose monitoring test strip    no brand specified    100 strip    Use to test blood sugars 2 times daily or as directed    Type 2 diabetes mellitus with peripheral neuropathy (H)       cyanocobalamin 1000 MCG tablet    vitamin  B-12    30 tablet    Take 1 tablet by mouth daily.    CKD (chronic kidney disease) stage 5, GFR less than 15 ml/min (H)       * cycloSPORINE modified 25 MG capsule    GENERIC EQUIVALENT    300 capsule    Take 5 capsules (125 mg) by mouth 2 times daily    Kidney replaced by transplant       * cycloSPORINE modified 25 MG capsule    GENERIC EQUIVALENT     300 capsule    TAKE 5 CAPSULES (125MG) BY MOUTH TWO TIMES A DAY    Kidney replaced by transplant       econazole nitrate 1 % cream     85 g    Apply topically daily    Type II or unspecified type diabetes mellitus with neurological manifestations, not stated as uncontrolled(250.60) (H), Dermatophytosis of foot       furosemide 20 MG tablet    LASIX    90 tablet    Take 1 tablet (20 mg) by mouth daily    Generalized edema       latanoprost 0.005 % ophthalmic solution    XALATAN    2.5 mL    Place 1 drop into both eyes At Bedtime    Mild stage glaucoma(365.71)       metFORMIN 500 MG 24 hr tablet    GLUCOPHAGE-XR    90 tablet    Take 3 tablets (1,500 mg) by mouth daily (with dinner)    Type 2 diabetes mellitus with diabetic polyneuropathy, without long-term current use of insulin (H)       mycophenolate 250 MG capsule    GENERIC EQUIVALENT    240 capsule    Take 4 capsules (1,000 mg) by mouth 2 times daily    Kidney transplanted       omeprazole 40 MG capsule    priLOSEC    90 capsule    Take 1 capsule (40 mg) by mouth daily    Gastroesophageal reflux disease without esophagitis       ondansetron 4 MG ODT tab    ZOFRAN-ODT    20 tablet    Take 1 tablet (4 mg) by mouth every 6 hours as needed    Chronic kidney disease, stage V (H)       order for DME     1 Units    Walker with front wheels and a seat.    Fall, initial encounter       prazosin 5 MG capsule    MINIPRESS    90 capsule    Take 3 capsules (15 mg) by mouth At Bedtime    Nightmares associated with chronic post-traumatic stress disorder       REFRESH OP      Apply to eye as needed Both eyes        replens Gel     35 g    Use vaginally as needed. Can use up to 3 times per week.    Vaginal dryness       simvastatin 20 MG tablet    ZOCOR    90 tablet    Take 1 tablet (20 mg) by mouth At Bedtime    Chronic kidney disease, stage V (H)       sulfamethoxazole-trimethoprim 400-80 MG per tablet    BACTRIM/SEPTRA    90 tablet    Take 1 tablet by mouth daily     Immunosuppression (H)       topiramate 200 MG tablet    TOPAMAX    60 tablet    Take 1 tablet (200 mg) by mouth 2 times daily    Morbid obesity due to excess calories (H)       TYLENOL 325 MG tablet   Generic drug:  acetaminophen      Take 325-650 mg by mouth as needed        vilazodone 40 MG Tabs tablet    VIIBRYD    30 tablet    Take 1 tablet (40 mg) by mouth daily    Major depressive disorder, recurrent episode, moderate (H)       vitamin D 1000 units capsule     30 capsule    2,000 Units        * Notice:  This list has 4 medication(s) that are the same as other medications prescribed for you. Read the directions carefully, and ask your doctor or other care provider to review them with you.

## 2018-04-16 NOTE — NURSING NOTE
Chief Complaint   Patient presents with     Medication Update     3 month medication check     HARISH WILLIS at 10:33 AM on 4/16/2018.

## 2018-04-23 ENCOUNTER — TRANSFERRED RECORDS (OUTPATIENT)
Dept: HEALTH INFORMATION MANAGEMENT | Facility: CLINIC | Age: 63
End: 2018-04-23

## 2018-04-24 DIAGNOSIS — H40.9 MILD STAGE GLAUCOMA: Primary | ICD-10-CM

## 2018-04-25 ENCOUNTER — OFFICE VISIT (OUTPATIENT)
Dept: OPHTHALMOLOGY | Facility: CLINIC | Age: 63
End: 2018-04-25
Attending: OPHTHALMOLOGY
Payer: MEDICARE

## 2018-04-25 DIAGNOSIS — E11.9 TYPE 2 DIABETES MELLITUS WITHOUT RETINOPATHY (H): Primary | ICD-10-CM

## 2018-04-25 DIAGNOSIS — H40.9 MILD STAGE GLAUCOMA: ICD-10-CM

## 2018-04-25 DIAGNOSIS — H04.123 BILATERAL DRY EYES: ICD-10-CM

## 2018-04-25 PROCEDURE — 92015 DETERMINE REFRACTIVE STATE: CPT | Mod: ZF

## 2018-04-25 PROCEDURE — G0463 HOSPITAL OUTPT CLINIC VISIT: HCPCS | Mod: ZF

## 2018-04-25 PROCEDURE — 92133 CPTRZD OPH DX IMG PST SGM ON: CPT | Mod: ZF | Performed by: OPHTHALMOLOGY

## 2018-04-25 ASSESSMENT — CUP TO DISC RATIO
OD_RATIO: 0.8
OS_RATIO: 0.8

## 2018-04-25 ASSESSMENT — REFRACTION_WEARINGRX
OS_ADD: +2.50
OD_CYLINDER: +2.25
OS_SPHERE: -1.25
OD_AXIS: 142
OS_CYLINDER: +2.25
OD_ADD: +2.50
OS_AXIS: 116
OD_SPHERE: -1.00

## 2018-04-25 ASSESSMENT — VISUAL ACUITY
METHOD: SNELLEN - LINEAR
OD_PH_CC: 20/40-2
OS_CC+: -1
CORRECTION_TYPE: GLASSES
OD_CC: 20/60-2
OS_CC: 20/30

## 2018-04-25 ASSESSMENT — REFRACTION_MANIFEST
OD_AXIS: 054
OS_ADD: +2.50
OD_ADD: +2.50
OD_CYLINDER: +1.75
OS_CYLINDER: +2.00
OS_AXIS: 106
OS_SPHERE: -1.25
OD_SPHERE: -1.75

## 2018-04-25 ASSESSMENT — EXTERNAL EXAM - LEFT EYE: OS_EXAM: NORMAL

## 2018-04-25 ASSESSMENT — TONOMETRY
OS_IOP_MMHG: 13
OD_IOP_MMHG: 13
IOP_METHOD: TONOPEN

## 2018-04-25 ASSESSMENT — CONF VISUAL FIELD
OD_SUPERIOR_NASAL_RESTRICTION: 3
OS_NORMAL: 1
METHOD: COUNTING FINGERS

## 2018-04-25 ASSESSMENT — EXTERNAL EXAM - RIGHT EYE: OD_EXAM: NORMAL

## 2018-04-25 NOTE — MR AVS SNAPSHOT
After Visit Summary   4/25/2018    Elizabeth Palma    MRN: [id number]           Patient Information     Date Of Birth          1955        Visit Information        Provider Department      4/25/2018 9:45 AM Yonas Underwood MD [facility]        Today's Diagnoses     Mild stage glaucoma           Follow-ups after your visit        Follow-up notes from your care team     Return in about 4 weeks (around 5/23/2018) for ortho exam, [place] only.      Your next 10 appointments already scheduled     Apr 26, 2018  9:00 AM CDT   LAB with  LAB   [facility] ([facility])    [address]  [address] [facility] [address] [phone]           Please do not eat 10-12 hours before your appointment if you are coming in fasting for labs on lipids, cholesterol, or glucose (sugar). This does not apply to pregnant women. Water, hot tea and black coffee (with nothing added) are okay. Do not drink other fluids, diet soda or chew gum.            Apr 26, 2018  9:30 AM CDT   (Arrive by 9:15 AM)   NUTRITION VISIT with Francesca Danielle RD   [facility] Surgical Weight Management ([facility])    [address]  4th Floor  [facility] [address] [phone]            May 01, 2018  1:00 PM CDT   Adult Med Follow UP with Chaparrita Hatch MD   [facility] Psychiatry ([facility])    [address]  [facility] [address]   [phone]            May 07, 2018  2:00 PM CDT   (Arrive by 1:45 PM)   RETURN ENDOCRINE with Lizbet Byers MD   [facility] Endocrinology ([facility])    [address]  3rd Floor  [facility] [address] [phone]            May 24, 2018  9:15 AM CDT   RETURN GENERAL with Yonas Underwood MD   [facility] ([facility])    [address]  [address] [address]  [facility] [id number] [phone]             Jun 18, 2018  1:30 PM CDT   Return Visit with Javier Tuttle DPM   [facility] ([facility])    [address] [facility] [id number] [phone]            Jul 02, 2018 10:45 AM CDT   (Arrive by 10:30 AM)   Return Visit with Prakash Irene MD   [facility] Medical Weight Management ([facility])    [address]  4th Floor  [facility] [address] [phone]            Mar 18, 2019 12:45 PM CDT   (Arrive by 12:15 PM)   Return Kidney Transplant with Christian Jimenez MD   [facility] Nephrology ([facility])    [address]  [address]  [facility] [address] [phone]              Who to contact     Please call your clinic at [phone] to:    Ask questions about your health    Make or cancel appointments    Discuss your medicines    Learn about your test results    Speak to your doctor            Additional Information About Your Visit        [facility] Information     [facility] gives you secure access to your electronic health record. If you see a primary care provider, you can also send messages to your care team and make appointments. If you have questions, please call your primary care clinic.  If you do not have a primary care provider, please call [phone] and they will assist you.      [facility] is an electronic gateway that provides easy, online access to your medical records. With [facility], you can request a clinic appointment, read your test results, renew a prescription or communicate with your care team.     To access your existing account, please contact your [facility] or call [phone] for assistance.        Care EveryWhere ID     This is your Care EveryWhere ID. This could be used by other organizations to access your [facility] medical records  [id number]         Blood Pressure from Last 3 Encounters:   04/16/18 115/81   03/27/18 137/78    03/19/18 124/78    Weight from Last 3 Encounters:   04/16/18 71.4 kg (157 lb 4.8 oz)   03/27/18 71.3 kg (157 lb 3.2 oz)   03/16/18 71.9 kg (158 lb 8 oz)              We Performed the Following     OCT Optic Nerve RNFL Spectralis OU (both eyes)          Today's Medication Changes          These changes are accurate as of 4/25/18 11:46 AM.  If you have any questions, ask your nurse or doctor.               These medicines have changed or have updated prescriptions.        Dose/Directions    cyanocobalamin 1000 MCG tablet   Commonly known as:  vitamin  B-12   This may have changed:  when to take this   Used for:  CKD (chronic kidney disease) stage 5, GFR less than 15 ml/min (H)        Dose:  1000 mcg   Take 1 tablet by mouth daily.   Quantity:  30 tablet   Refills:  0                Primary Care Provider Office Phone # Fax #    Horacio Sheridan [phone]       [address] [place] [address] [address] [place] [address]        Equal Access to Services     LUCIO [facility] Center[facility] AH: Hadii riky lehman hadasho Soomaali, waaxda luqadaha, qaybta kaalmada adeegyada, waxay jaycee agrawal . So [facility] [phone].    ATENCIÓN: Si habla español, tiene a goetz disposición servicios gratuitos de asistencia lingüística. Ll[facility] [phone].    We comply with applicable federal civil rights laws and [place] laws. We do not discriminate on the basis of race, color, national origin, age, disability, sex, sexual orientation, or gender identity.            Thank you!     Thank you for choosing EYE CLINIC  for your care. Our goal is always to provide you with excellent care. Hearing back from our patients is one way we can continue to improve our services. Please take a few minutes to complete the written survey that you may receive in the mail after your visit with us. Thank you!             Your Updated Medication List - Protect others around you: Learn how to safely use, store and throw away your medicines at  www.disposemymeds.org.          This list is accurate as of 4/25/18 11:46 AM.  Always use your most recent med list.                   Brand Name Dispense Instructions for use Diagnosis    acetylcysteine 600 MG Caps capsule    N-ACETYL CYSTEINE    30 capsule    Take 2 400 mg caps two times daily for total daily dose of 800 mg        albuterol 108 (90 Base) MCG/ACT Inhaler    PROAIR HFA/PROVENTIL HFA/VENTOLIN HFA    1 Inhaler    Inhale 2 puffs into the lungs every 6 hours as needed for shortness of breath / dyspnea or wheezing    Exercise-induced asthma       ARIPiprazole 2 MG tablet    ABILIFY    15 tablet    Take 0.5 tablets (1 mg) by mouth daily    Major depressive disorder, recurrent episode, moderate (H)       aspirin 81 MG EC tablet     30 tablet    Take 1 tablet (81 mg) by mouth daily    Kidney replaced by transplant       BENADRYL 25 MG capsule   Generic drug:  diphenhydrAMINE      Take 25 mg by mouth At Bedtime.        blood glucose monitoring lancets     1 Box    Use to test blood sugar 2 times daily or as directed.    Type 2 diabetes mellitus with peripheral neuropathy (H)       * blood glucose monitoring meter device kit    no brand specified    1 kit    Use to test blood sugar 2 times daily or as directed.    Type 2 diabetes mellitus with peripheral neuropathy (H)       * blood glucose monitoring meter device kit           blood glucose monitoring test strip    no brand specified    100 strip    Use to test blood sugars 2 times daily or as directed    Type 2 diabetes mellitus with peripheral neuropathy (H)       cyanocobalamin 1000 MCG tablet    vitamin  B-12    30 tablet    Take 1 tablet by mouth daily.    CKD (chronic kidney disease) stage 5, GFR less than 15 ml/min (H)       * cycloSPORINE modified 25 MG capsule    GENERIC EQUIVALENT    300 capsule    Take 5 capsules (125 mg) by mouth 2 times daily    Kidney replaced by transplant       * cycloSPORINE modified 25 MG capsule    GENERIC EQUIVALENT     300 capsule    TAKE 5 CAPSULES (125MG) BY MOUTH TWO TIMES A DAY    Kidney replaced by transplant       econazole nitrate 1 % cream     85 g    Apply topically daily    Type II or unspecified type diabetes mellitus with neurological manifestations, not stated as uncontrolled(250.60) (H), Dermatophytosis of foot       furosemide 20 MG tablet    LASIX    90 tablet    Take 1 tablet (20 mg) by mouth daily    Generalized edema       latanoprost 0.005 % ophthalmic solution    XALATAN    2.5 mL    Place 1 drop into both eyes At Bedtime    Mild stage glaucoma(365.71)       metFORMIN 500 MG 24 hr tablet    GLUCOPHAGE-XR    90 tablet    Take 3 tablets (1,500 mg) by mouth daily (with dinner)    Type 2 diabetes mellitus with diabetic polyneuropathy, without long-term current use of insulin (H)       mycophenolate 250 MG capsule    GENERIC EQUIVALENT    240 capsule    Take 4 capsules (1,000 mg) by mouth 2 times daily    Kidney transplanted       omeprazole 40 MG capsule    priLOSEC    90 capsule    Take 1 capsule (40 mg) by mouth daily    Gastroesophageal reflux disease without esophagitis       ondansetron 4 MG ODT tab    ZOFRAN-ODT    20 tablet    Take 1 tablet (4 mg) by mouth every 6 hours as needed    Chronic kidney disease, stage V (H)       order for DME     1 Units    Walker with front wheels and a seat.    Fall, initial encounter       prazosin 5 MG capsule    MINIPRESS    90 capsule    Take 3 capsules (15 mg) by mouth At Bedtime    Nightmares associated with chronic post-traumatic stress disorder       REFRESH OP      Apply to eye as needed Both eyes        replens Gel     35 g    Use vaginally as needed. Can use up to 3 times per week.    Vaginal dryness       simvastatin 20 MG tablet    ZOCOR    90 tablet    Take 1 tablet (20 mg) by mouth At Bedtime    Chronic kidney disease, stage V (H)       sulfamethoxazole-trimethoprim 400-80 MG per tablet    BACTRIM/SEPTRA    90 tablet    Take 1 tablet by mouth daily     Immunosuppression (H)       topiramate 200 MG tablet    TOPAMAX    60 tablet    Take 1 tablet (200 mg) by mouth 2 times daily    Morbid obesity due to excess calories (H)       TYLENOL 325 MG tablet   Generic drug:  acetaminophen      Take 325-650 mg by mouth as needed        vilazodone 40 MG Tabs tablet    VIIBRYD    30 tablet    Take 1 tablet (40 mg) by mouth daily    Major depressive disorder, recurrent episode, moderate (H)       vitamin D 1000 units capsule     30 capsule    2,000 Units        * Notice:  This list has 4 medication(s) that are the same as other medications prescribed for you. Read the directions carefully, and ask your doctor or other care provider to review them with you.

## 2018-04-25 NOTE — PROGRESS NOTES
Chief Complaints and History of Present Illnesses   Patient presents with     Diabetic Eye Exam     POAG, AMBER OU.       Patient has multiple eye eye complaints today.  She feels like her vision is very blurry, noticing that it is very hard to read.  She feels like she may need new glasses. She has also noted seeing more flashing lights as of recent.  She is seeing some flashes a couple of times a week.  She also cites worsening of intermittent double vision (now constant).  She sees two images  horizontally.  This is only an issue for her at distance and she only notices it while driving.  Sugars have been good last A1C 6.4.    Assessment & Plan     Elizabeth Palma is a 62 year old female with the following diagnoses:   1. Type 2 diabetes mellitus without retinopathy (H)    2. Mild stage glaucoma - Both Eyes    3. Bilateral dry eyes       1) On Metformin for diabetes.    No retinopathy  Stressed good glycemic and hypertensive control  Monitor yearly     2) Mild primary open angle glaucoma (POAG) both eyes     Maximum intraocular pressure 21/18  Family history: negative  Trauma history: negative  Pachymetry : 532/527     Using latanoprost at bedtime both eyes with good compliance  Intraocular pressure great both eyes   Visual field stable today both eyes     3) Dry eye syndrome     4) Double vision    Appears monocular in the right eye   Artificial tears four times a day and gel artificial tears at bedtime for 1 mo  Recommend follow-up un-dilated alignment check in 1 mo if not resolved  Close right eye while driving as needed     Continue artificial tears as needed     Recheck OCT/visual field in 1 year    Stiven Stoll M.D.  PGY-2, Ophthalmology       Attending Physician Attestation:  Complete documentation of historical and exam elements from today's encounter can be found in the full encounter summary report (not reduplicated in this progress note).  I personally obtained the chief complaint(s) and  history of present illness.  I confirmed and edited as necessary the review of systems, past medical/surgical history, family history, social history, and examination findings as documented by others; and I examined the patient myself.  I personally reviewed the relevant tests, images, and reports as documented above.  I formulated and edited as necessary the assessment and plan and discussed the findings and management plan with the patient and family. Attending Physician Image/Tesing Attestation: I personally reviewed the ophthalmic test(s) associated with this encounter, agree with the interpretation(s) as documented by the resident/fellow, and have edited the corresponding report(s) as necessary.  . - Yonas Underwood MD

## 2018-04-25 NOTE — NURSING NOTE
Chief Complaints and History of Present Illnesses   Patient presents with     Diabetic Eye Exam     POAG, AMBER OU.     HPI    Affected eye(s):  Both   Symptoms:     Blurred vision   Decreased vision   Difficulty with reading   Double vision   Flashes         Do you have eye pain now?:  No      Comments:  Complains of binocular intermittent diplopia for the last few months. Notes that it's mostly mid to near vision. Does not remember anything in particular triggering it. When orthoptist came in the room to check muscles she told her that she was having monocular diplopia out of the RE at near.   Has had flashes of light for the last few weeks in both eyes. Has this happen once in a while  Latanya Boston COA 9:53 AM April 25, 2018

## 2018-04-26 ENCOUNTER — ALLIED HEALTH/NURSE VISIT (OUTPATIENT)
Dept: SURGERY | Facility: CLINIC | Age: 63
End: 2018-04-26
Payer: MEDICARE

## 2018-04-26 VITALS — WEIGHT: 155.3 LBS | HEIGHT: 61 IN | BODY MASS INDEX: 29.32 KG/M2

## 2018-04-26 DIAGNOSIS — Z48.298 AFTERCARE FOLLOWING ORGAN TRANSPLANT: ICD-10-CM

## 2018-04-26 DIAGNOSIS — E78.5 HYPERLIPIDEMIA, UNSPECIFIED HYPERLIPIDEMIA TYPE: ICD-10-CM

## 2018-04-26 LAB
ANION GAP SERPL CALCULATED.3IONS-SCNC: 11 MMOL/L (ref 3–14)
BUN SERPL-MCNC: 12 MG/DL (ref 7–30)
CALCIUM SERPL-MCNC: 9.2 MG/DL (ref 8.5–10.1)
CHLORIDE SERPL-SCNC: 107 MMOL/L (ref 94–109)
CHOLEST SERPL-MCNC: 169 MG/DL
CO2 SERPL-SCNC: 22 MMOL/L (ref 20–32)
CREAT SERPL-MCNC: 0.93 MG/DL (ref 0.52–1.04)
CYCLOSPORINE BLD LC/MS/MS-MCNC: 99 UG/L (ref 50–400)
ERYTHROCYTE [DISTWIDTH] IN BLOOD BY AUTOMATED COUNT: 14.4 % (ref 10–15)
GFR SERPL CREATININE-BSD FRML MDRD: 62 ML/MIN/1.7M2
GLUCOSE SERPL-MCNC: 147 MG/DL (ref 70–99)
HCT VFR BLD AUTO: 40.8 % (ref 35–47)
HDLC SERPL-MCNC: 54 MG/DL
HGB BLD-MCNC: 12.8 G/DL (ref 11.7–15.7)
LDLC SERPL CALC-MCNC: 86 MG/DL
MCH RBC QN AUTO: 26.8 PG (ref 26.5–33)
MCHC RBC AUTO-ENTMCNC: 31.4 G/DL (ref 31.5–36.5)
MCV RBC AUTO: 85 FL (ref 78–100)
NONHDLC SERPL-MCNC: 115 MG/DL
PLATELET # BLD AUTO: 185 10E9/L (ref 150–450)
POTASSIUM SERPL-SCNC: 3.8 MMOL/L (ref 3.4–5.3)
RBC # BLD AUTO: 4.78 10E12/L (ref 3.8–5.2)
SODIUM SERPL-SCNC: 140 MMOL/L (ref 133–144)
TME LAST DOSE: NORMAL H
TRIGL SERPL-MCNC: 142 MG/DL
WBC # BLD AUTO: 3.7 10E9/L (ref 4–11)

## 2018-04-26 NOTE — MR AVS SNAPSHOT
MRN:[id number]                      After Visit Summary   4/26/2018    Elizabeth Palma    MRN: [id number]           Visit Information        Provider Department      4/26/2018 9:30 AM Francesca Danielle RD [facility] Surgical Weight Management        Your next 10 appointments already scheduled     May 01, 2018  1:00 PM CDT   Adult Med Follow UP with Chaparrita Hatch MD   [facility]    [address]  [facility] [address]   [phone]            May 07, 2018  2:00 PM CDT   (Arrive by 1:45 PM)   RETURN ENDOCRINE with Lizbet Byers MD   [facility] Endocrinology ([facility])    [address]  3rd [facility] [id number] [phone]            May 24, 2018  9:15 AM CDT   RETURN GENERAL with Yonas Underwood MD   Eye Clinic ([facility])    [address]  [facility] [address] [phone]            Jun 18, 2018  1:30 PM CDT   [facility] Visit with J[facility]uttle DPM   [facility]    [address] [place] [id number] [phone]            Jul 02, 2018 10:45 AM CDT   (Arrive by 10:30 AM)   Return Visit with Prakash Irene MD   [facility] Medical Weight Management ([facility])    [address]  4th Floor  [facility] [address] [phone]            Mar 18, 2019 12:45 PM CDT   (Arrive by 12:15 PM)   Return Kidney Transplant with Christian Jimenez MD   [facility] Nephrology ([facility])    [address]  [address]  [facility] [address] [phone]              Care Instructions    Goals:   1. Avoid grazing through, Eat 3 meals with lean protein at each meal, along with a non-starchy vegetable or whole fruit, up to 1/2 c carb at a meal.   -Try hard-boiled eggs to put on your  salad or to have later in the day to make up for skipping breakfast.    2. If needing a snack, have vegetables (give at least 2 hours between a meals and a snack).  3. Walk 5-10 min daily, increasing as able.           [employer] Information     [employer] gives you secure access to your electronic health record. If you see a primary care provider, you can also send messages to your care team and make appointments. If you have questions, please call your primary care clinic.  If you do not have a primary care provider, please call [phone] and they will assist you.      [employer] is an electronic gateway that provides easy, online access to your medical records. With [employer], you can request a clinic appointment, read your test results, renew a prescription or communicate with your care team.     To access your existing account, please contact your [facility] or call [phone] for assistance.        Care EveryWhere ID     This is your Care EveryWhere ID. This could be used by other organizations to access your [facility] medical records  [id number]        Equal Access to Services     LINNEA HIDALGO : Hadii riky muellero Somartín, waaxda luqadaha, qaybta kaalmada adeelizabeth, ty fam. So [facility] [phone].    ATENCIÓN: Si habla español, tiene a goetz disposición servicios gratuitos de asistencia lingüística. Llame al [phone].    We comply with applicable federal civil rights laws and [place] laws. We do not discriminate on the basis of race, color, national origin, age, disability, sex, sexual orientation, or gender identity.

## 2018-04-26 NOTE — PROGRESS NOTES
"Nutrition Reassessment  Reason For Visit:  Elizabeth Palma is a 62 year-old female with type 2 DM (oral med management) presenting today for nutrition follow-up, s/p \"gastric bypass in 1990 at [facility]\" per Pt report.  She is seeing medical weight management.  She was referred by Dr. Irene.  Note: Pt had a kidney transplant on March 20, 2014.    Pt was accompanied by her .     Anthropometrics:  Height: 61\"  Pre-op Weight: 265 lbs per Pt report; lowest weight after bariatric surgery: 165-170 lbs (25 years ago)  (Pt reported that she initially was at 215 lbs in 2015 prior to seeing writer.)    Current Weight: 155.3 lbs (-3.2 lbs over the past month) with BMI of 29.41.    Current Vitamins/Minerals: 3000 International Units vitamin D/day, Calcium, vitamin C, vitamin B12 daily, B-complex  *Pt stated that she was told not to take other vitamins/minerals by MD.    Nutrition History:  Lactose intolerance ever since bariatric surgery.  Pt stated that she has osteoporosis; however, she stated that her doctors don't want her to take any vitamins or minerals due to her kidney transplant.    Diet/Nutrition Intake Hx: Pt reports her intake varies due to fluctuating appetite. Per pt there are days when she eats 3 meals but on other days she may not eat much at all or nibble on something through out the day. Pt also states her intake is affected by nausea 2/2 her anti-rejection medications. However pt reports she tries to make healthy choices (lower in calorie). Pt is also limited in protein choices that she likes - pt reports she does not take much dairy, does not like beans and lentils, keeps kosher. Advised pt to try to time her meals and eat every 4 hours instead of grazing but pt refused stating she would rather not eat at all.     *Pt stated that she will not record food intake due to the fact that every time she does this, she simply does not eat as she doesn't want to record.  She stated that her therapist " strongly advises against recording food intake for this reason.    Physical Activity Note: Pt reports she has a tendency to break bones so she is limited with what exercises she does. Pt states there is a long hallway in the building that she needs to walk when going out. Pt states she does not walk for exercises but walks only to get ADLs done.      Progress with Previous Goals:    1. Avoid grazing through, Eat 3 meals with lean protein at each meal, along with a non-starchy vegetable or whole fruit, up to 1/2 c carb at a meal.- meeting.    -Try hard-boiled eggs to put on your salad or to have later in the day to make up for skipping breakfast.- She is doing this or yogurt.  2. If needing a snack, have vegetables (give at least 2 hours between a meals and a snack). - meeting.   3. Walk 5-10 min daily, increasing as able.- Meeting now that the weather is nicer. (Every day, 5-10 min.)  4. Check vitamin and mineral labs at next doctor appointment due to history of bariatric surgery (vitamin A, Vitamin D, zinc, iron, B12, B1, calcium, PTH). - Pt stated her doctor said she is fine and doesn't need to take a MVI.     Nutrition Prescription:  Grams Protein: 60 (minimum)  Amount of Fluid: 48-64 oz    Nutrition Diagnosis  Previous: Overweight/Obesity related to excessive energy intake as evidenced by BMI > 25.  - continues.    Intervention  Intervention At Appointment:  Materials/Education provided:  Praised Pt on weight loss thus far through positive changes made to lifestyle.  Reviewed previous goals.  Discussed challenges surrounding getting her  to gain weight (he was hospitalized with pneumonia twice within the past couple of months) while continuing to work on losing weight herself.  Gave encouragement and support.    Patient Understanding: good  Expected Compliance: good with continued RD follow up.    Goals:   1. Avoid grazing through, Eat 3 meals with lean protein at each meal, along with a non-starchy  vegetable or whole fruit, up to 1/2 c carb at a meal.   -Try hard-boiled eggs to put on your salad or to have later in the day to make up for skipping breakfast.    2. If needing a snack, have vegetables (give at least 2 hours between a meals and a snack).  3. Walk 5-10 min daily, increasing as able.    Follow-Up: 1 month    Time spent with patient: 15 minutes.  Francesca Danielle, MS, RDN, LDN, CLT  Pager: [phone]

## 2018-04-26 NOTE — PATIENT INSTRUCTIONS
Goals:   1. Avoid grazing through, Eat 3 meals with lean protein at each meal, along with a non-starchy vegetable or whole fruit, up to 1/2 c carb at a meal.   -Try hard-boiled eggs to put on your salad or to have later in the day to make up for skipping breakfast.    2. If needing a snack, have vegetables (give at least 2 hours between a meals and a snack).  3. Walk 5-10 min daily, increasing as able.

## 2018-05-01 ENCOUNTER — OFFICE VISIT (OUTPATIENT)
Dept: PSYCHIATRY | Facility: CLINIC | Age: 63
End: 2018-05-01
Payer: MEDICARE

## 2018-05-01 VITALS
HEART RATE: 88 BPM | SYSTOLIC BLOOD PRESSURE: 128 MMHG | WEIGHT: 155 LBS | DIASTOLIC BLOOD PRESSURE: 68 MMHG | BODY MASS INDEX: 29.29 KG/M2 | TEMPERATURE: 97.8 F | RESPIRATION RATE: 16 BRPM

## 2018-05-01 DIAGNOSIS — F41.1 GENERALIZED ANXIETY DISORDER: ICD-10-CM

## 2018-05-01 DIAGNOSIS — F43.10 POSTTRAUMATIC STRESS DISORDER: ICD-10-CM

## 2018-05-01 DIAGNOSIS — F51.5 NIGHTMARES ASSOCIATED WITH CHRONIC POST-TRAUMATIC STRESS DISORDER: ICD-10-CM

## 2018-05-01 DIAGNOSIS — F60.81 NARCISSISTIC PERSONALITY DISORDER (H): ICD-10-CM

## 2018-05-01 DIAGNOSIS — F43.12 NIGHTMARES ASSOCIATED WITH CHRONIC POST-TRAUMATIC STRESS DISORDER: ICD-10-CM

## 2018-05-01 DIAGNOSIS — F33.1 MAJOR DEPRESSIVE DISORDER, RECURRENT EPISODE, MODERATE (H): Primary | ICD-10-CM

## 2018-05-01 ASSESSMENT — PAIN SCALES - GENERAL: PAINLEVEL: MODERATE PAIN (4)

## 2018-05-01 NOTE — MR AVS SNAPSHOT
After Visit Summary   5/1/2018    Elizabeth Palma    MRN: [id number]           Patient Information     Date Of Birth          1955        Visit Information        Provider Department      5/1/2018 1:00 PM Chaparrita Hatch MD [facility]         Follow-ups after your visit        Your next 10 appointments already scheduled     May 07, 2018  2:00 PM CDT   (Arrive by 1:45 PM)   RETURN ENDOCRINE with Lizbet Byers MD   [facility] Endocrinology ([facility])    [address] [place] [address]  3rd [facility] [address] [phone]            May 17, 2018  9:00 AM CDT   LAB with  LAB   [facility] Lab ([facility])    [address] [place] [address]  1st [facility] [id number] [phone]           Please do not eat 10-12 hours before your appointment if you are coming in fasting for labs on lipids, cholesterol, or glucose (sugar). This does not apply to pregnant women. Water, hot tea and black coffee (with nothing added) are okay. Do not drink other fluids, diet soda or chew gum.            May 17, 2018  9:30 AM CDT   (Arrive by 9:15 AM)   NUTRITION VISIT with Francesca Danielle RD   [facility] Surgical Weight Management ([facility])    [address] [place] [address]  4th [facility] [address] [phone]            May 24, 2018  9:15 AM CDT   RETURN GENERAL with Yonas Underwood MD   [facility] Clinic ([facility])    Derik Patel [address]  9[facility]  [facility] [address]   [phone]            Jun 05, 2018  1:00 PM CDT   Adult Med Follow UP with Chaparrita Hatch MD   [facility] Psych[facility]ry ([facility])    [address]  [facility] [address] [phone]            Jun 18, 2018  1:30 PM CDT   Return Visit with Javier Tuttle DPM   [facility] ([facility])    [address] [place] [address]  N  [place] [address]   [phone]            Jun 19, 2018  9:00 AM CDT   LAB with  LAB    Health Lab ([facility])    [address] [place] [address] [phone]           Please do not eat 10-12 hours before your appointment if you are coming in fasting for labs on lipids, cholesterol, or glucose (sugar). This does not apply to pregnant women. Water, hot tea and black coffee (with nothing added) are okay. Do not drink other fluids, diet soda or chew gum.            Jun 19, 2018  9:30 AM CDT   (Arrive by 9:15 AM)   NUTRITION VISIT with Francesca Danielle RD   [facility] Surgical [facility] Management ([facility])    [address] [place] [id number] [phone]            Jul 02, 2018 10:45 AM CDT   (Arrive by 10:30 AM)   Return Visit with Prakash Irene MD   [facility] Medical Weight Management ([facility])    [address] [place] [id number] [phone]            Jul 31, 2018  9:30 AM CDT   (Arrive by 9:15 AM)   NUTRITION VISIT with Francesca Danielle RD   [facility] ([facility])    [address] [place] [id number] [phone]              Who to contact     Please call your clinic at [phone] to:    Ask questions about your health    Make or cancel appointments    Discuss your medicines    Learn about your test results    Speak to your doctor            Additional Information About Your Visit        [employer] Information     [employer] gives you secure access to your electronic health record. If you see a primary care provider, you can also send messages to your care team and make appointments. If you have questions, please call your primary care clinic.  If you do not have a primary care provider, please call [phone] and they will assist you.       [employer] is an electronic gateway that provides easy, online access to your medical records. With [employer], you can request a clinic appointment, read your test results, renew a prescription or communicate with your care team.     To access your existing account, please contact your [facility] or call [phone] for assistance.        Care EveryWhere ID     This is your Care EveryWhere ID. This could be used by other organizations to access your [place] medical records  [id number]        Your Vitals Were     Pulse Temperature Respirations BMI (Body Mass Index)          88 97.8  F (36.6  C) 16 29.29 kg/m2         Blood Pressure from Last 3 Encounters:   05/01/18 128/68   04/16/18 115/81   03/27/18 137/78    Weight from Last 3 Encounters:   05/01/18 155 lb (70.3 kg)   04/26/18 155 lb 4.8 oz (70.4 kg)   04/16/18 157 lb 4.8 oz (71.4 kg)              Today, you had the following     No orders found for display         Today's Medication Changes          These changes are accurate as of 5/1/18  1:35 PM.  If you have any questions, ask your nurse or doctor.               These medicines have changed or have updated prescriptions.        Dose/Directions    cyanocobalamin 1000 MCG tablet   Commonly known as:  vitamin  B-12   This may have changed:  when to take this   Used for:  CKD (chronic kidney disease) stage 5, GFR less than 15 ml/min (H)        Dose:  1000 mcg   Take 1 tablet by mouth daily.   Quantity:  30 tablet   Refills:  0                Primary Care Provider Office Phone # Fax #    Horacio Sheridan [phone]       [address] [place] [address]        Equal Access to Services     LUCIO HIDALGO AH: Hadpenny Quevedo, radha aleman, ty perla. So [facility] [phone].    ATENCIÓN: Si habla español, tiene a goetz disposición servicios gratuitos de asistencia lingüística. Llame al  [phone].    We comply with applicable federal civil rights laws and [place] laws. We do not discriminate on the basis of race, color, national origin, age, disability, sex, sexual orientation, or gender identity.            Thank you!     Thank you for choosing [facility]  for your care. Our goal is always to provide you with excellent care. Hearing back from our patients is one way we can continue to improve our services. Please take a few minutes to complete the written survey that you may receive in the mail after your visit with us. Thank you!             Your Updated Medication List - Protect others around you: Learn how to safely use, store and throw away your medicines at www.disposemymeds.org.          This list is accurate as of 5/1/18  1:35 PM.  Always use your most recent med list.                   Brand Name Dispense Instructions for use Diagnosis    acetylcysteine 600 MG Caps capsule    N-ACETYL CYSTEINE    30 capsule    Take 2 400 mg caps two times daily for total daily dose of 800 mg        albuterol 108 (90 Base) MCG/ACT Inhaler    PROAIR HFA/PROVENTIL HFA/VENTOLIN HFA    1 Inhaler    Inhale 2 puffs into the lungs every 6 hours as needed for shortness of breath / dyspnea or wheezing    Exercise-induced asthma       ARIPiprazole 2 MG tablet    ABILIFY    15 tablet    Take 0.5 tablets (1 mg) by mouth daily    Major depressive disorder, recurrent episode, moderate (H)       aspirin 81 MG EC tablet     30 tablet    Take 1 tablet (81 mg) by mouth daily    Kidney replaced by transplant       BENADRYL 25 MG capsule   Generic drug:  diphenhydrAMINE      Take 25 mg by mouth At Bedtime.        blood glucose monitoring lancets     1 Box    Use to test blood sugar 2 times daily or as directed.    Type 2 diabetes mellitus with peripheral neuropathy (H)       * blood glucose monitoring meter device kit    no brand specified    1 kit    Use to test blood sugar 2 times daily or as directed.    Type 2  diabetes mellitus with peripheral neuropathy (H)       * blood glucose monitoring meter device kit           blood glucose monitoring test strip    no brand specified    100 strip    Use to test blood sugars 2 times daily or as directed    Type 2 diabetes mellitus with peripheral neuropathy (H)       cyanocobalamin 1000 MCG tablet    vitamin  B-12    30 tablet    Take 1 tablet by mouth daily.    CKD (chronic kidney disease) stage 5, GFR less than 15 ml/min (H)       * cycloSPORINE modified 25 MG capsule    GENERIC EQUIVALENT    300 capsule    Take 5 capsules (125 mg) by mouth 2 times daily    Kidney replaced by transplant       * cycloSPORINE modified 25 MG capsule    GENERIC EQUIVALENT    300 capsule    TAKE 5 CAPSULES (125MG) BY MOUTH TWO TIMES A DAY    Kidney replaced by transplant       econazole nitrate 1 % cream     85 g    Apply topically daily    Type II or unspecified type diabetes mellitus with neurological manifestations, not stated as uncontrolled(250.60) (H), Dermatophytosis of foot       furosemide 20 MG tablet    LASIX    90 tablet    Take 1 tablet (20 mg) by mouth daily    Generalized edema       latanoprost 0.005 % ophthalmic solution    XALATAN    2.5 mL    Place 1 drop into both eyes At Bedtime    Mild stage glaucoma(365.71)       metFORMIN 500 MG 24 hr tablet    GLUCOPHAGE-XR    90 tablet    Take 3 tablets (1,500 mg) by mouth daily (with dinner)    Type 2 diabetes mellitus with diabetic polyneuropathy, without long-term current use of insulin (H)       mycophenolate 250 MG capsule    GENERIC EQUIVALENT    240 capsule    Take 4 capsules (1,000 mg) by mouth 2 times daily    Kidney transplanted       omeprazole 40 MG capsule    priLOSEC    90 capsule    Take 1 capsule (40 mg) by mouth daily    Gastroesophageal reflux disease without esophagitis       ondansetron 4 MG ODT tab    ZOFRAN-ODT    20 tablet    Take 1 tablet (4 mg) by mouth every 6 hours as needed    Chronic kidney disease, stage V (H)        order for DME     1 Units    Walker with front wheels and a seat.    Fall, initial encounter       prazosin 5 MG capsule    MINIPRESS    90 capsule    Take 3 capsules (15 mg) by mouth At Bedtime    Nightmares associated with chronic post-traumatic stress disorder       REFRESH OP      Apply to eye as needed Both eyes        replens Gel     35 g    Use vaginally as needed. Can use up to 3 times per week.    Vaginal dryness       simvastatin 20 MG tablet    ZOCOR    90 tablet    Take 1 tablet (20 mg) by mouth At Bedtime    Chronic kidney disease, stage V (H)       sulfamethoxazole-trimethoprim 400-80 MG per tablet    BACTRIM/SEPTRA    90 tablet    Take 1 tablet by mouth daily    Immunosuppression (H)       topiramate 200 MG tablet    TOPAMAX    60 tablet    Take 1 tablet (200 mg) by mouth 2 times daily    Morbid obesity due to excess calories (H)       TYLENOL 325 MG tablet   Generic drug:  acetaminophen      Take 325-650 mg by mouth as needed        vilazodone 40 MG Tabs tablet    VIIBRYD    30 tablet    Take 1 tablet (40 mg) by mouth daily    Major depressive disorder, recurrent episode, moderate (H)       vitamin D 1000 units capsule     30 capsule    2,000 Units        * Notice:  This list has 4 medication(s) that are the same as other medications prescribed for you. Read the directions carefully, and ask your doctor or other care provider to review them with you.
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
## 2018-05-02 ASSESSMENT — PATIENT HEALTH QUESTIONNAIRE - PHQ9: SUM OF ALL RESPONSES TO PHQ QUESTIONS 1-9: 6

## 2018-05-07 ENCOUNTER — OFFICE VISIT (OUTPATIENT)
Dept: ENDOCRINOLOGY | Facility: CLINIC | Age: 63
End: 2018-05-07
Payer: MEDICARE

## 2018-05-07 VITALS
HEART RATE: 96 BPM | HEIGHT: 61 IN | SYSTOLIC BLOOD PRESSURE: 130 MMHG | WEIGHT: 157.6 LBS | DIASTOLIC BLOOD PRESSURE: 77 MMHG | BODY MASS INDEX: 29.76 KG/M2

## 2018-05-07 DIAGNOSIS — M81.0 AGE-RELATED OSTEOPOROSIS WITHOUT CURRENT PATHOLOGICAL FRACTURE: Primary | ICD-10-CM

## 2018-05-07 NOTE — PATIENT INSTRUCTIONS
6 month labs followed by appointment. [ early November]     DEXA scan in 6 month prior to the visit.     24 hour urine during the next visit.     24 Hour Urine Collection instruction    Decide a day you want to collect the urine for 24 hours.   On the day of collection, discard the first void urine in the morning.   Start collecting all the urine in the provided container for the entire day and over night.   The next morning when you wake up collect the first void urine in the container and then submit the container to the lab.  Store in the refrigerator     ===================

## 2018-05-07 NOTE — PROGRESS NOTES
Diabetes, Endocrinology and Metabolism Clinic -return visit      Reason for visit: Elizabeth Palma is 59 year old female referred to endocrine clinic for follow up of osteoporosis with compression fracture treated with reclast since 2015. Wrist fracture in summer 2017 but limited treatment options due to renal transplant and PTH elevation.       Assessment and Plan:  Osteoporosis with 2.5 inch height loss.  Type 2 DM and neuropathy  History of gastric bypass  hyperparathyroidism--history of renal txp with nl kidney function and continues with mild PTH elevation, nl renal function. Due to high normal Ca, this was thought to be tertiary but with transplant and improvement in renal function, PTH level has been steadily improving.    Plans--  VFA = T9 compression fracture.   6 month DXA and Calcium labs followed by an appointment to review results and further planning.   Given wrist fracture which is new, other option would be Denusomab. Teriparatide is not an option due to hyperpara.   Continue Vit D3 daily  Ca 500 mg BID ( aim for 1200 mg daily intake)  Zoledronic acid infusion annually cycle 4 of 5 planned treatment in 2018.   Continue Metformin 1500 mg daily. Patient following up with Dr Sheridan.      For diet, rule of thumb--  1 cup of milk or yogurt = 1.5 oz hard cheese = about 300-350 mg elemental calcium; check the label on other potential sources like fortified almond or soy milk  can use a calcium carbonate or calcium citrate supplement to help reach that intake per day if not in the diet alone    Return in 6 months     Lizbet Byers MD  [id number]  Endocrinology Service    ==============    Interval history  She had a fall in July.  At that time she was playing mini golf.  She lost balance and landed on her knees.  She is not sure if put her wrist for balance but later started to develop pain on the left wrist.  X-ray confirmed a fracture that required surgery.  This was done in December 2017.    She has been  "taking calcium and vitamin D.  Last Reclast infusion was done on November 28, 2017.  No kidney stones.  Steadily losing weight.  Total 60 pounds.  On topiramate followed by Dr. Irene.  No back pain, height loss.      =======================    History of presenting illness: Ms. Palma has significant past medical history of DM type 2, dyslipidemia, ESRD on HD, PCKD, HTN, s/p gastric bypass, pre mature ovarian failure, lactose intolerance. She has long standing history of osteoporosis with last DXA scan in 2009 showed t score of - 3.8 at spine and z- score of - 4.0. She was started on HD in 3/2012. Prior to that she had received treatment with alendronate and \" 3 monthly IV\" likely ibandronate but was discontinued because of progressively decline in her kidney functions. She sustained multiple fracture including bilateral wrists and ankles.    She has multiple risk factors for osteoporosis including pre mature menopause, lactose intolerance and not being able to take Ca, gastric surgery and ESRD. She was explained that CKD related bone disease is low turn over condition and usually is not treated with bisphosphonate. Had DEXA 10/14 with significant interim bone loss so was re-referred to Endocrine.    Transplant done 3 years ago with improvement in kidney function and PTH level.     Reclast infusion #1 done June 2015, #2 done in September 2016, 3rd infusion November 2017.  Repeat DXA was done in 10/13/16 that showed improvement in lumbar verterbrae and hip compared to the last bone density.     Medications: This is according to her records.  She is not sure about the dose.  Vitamin D3 dose is 3000 IU daily  Also getting calcium + D at 500 mg calcium twice daily and an additional dairy serving daily    Type 2 Diabetes Mellitus with neuroapathy  Followed by PCP Dr Sheridan and doing well with control  Recently taken of Januvia due to ? Low BG  For DM2 remains on metformin XR 1500 mg daily with excellent at target " HbA1c.    No polyuria or polydipsia.     Morbid Obesity  Followed at wt loss clinic here by Dr. Irene--her wt is down about 29#   On Topiramate 200 mg daily   - states that she feels tired and somnolent but is happy with weight loss.    ROS is otherwise unremarkable.       PM/SH:  Past Medical History:   Diagnosis Date     Abnormal MRI, cervical spine 10/15/2011    2011; mild changes noted. Study done for left arm symptoms Impression:  1. Mild multilevel degenerative disc disease with no significant canal or neural stenosis seen. motion artifact on the STIR images in these are not interpretable. The remaining images were interpreted      Autosomal dominant polycystic kidney disease 2011     (Problem list name updated by automated process. Provider to review and confirm.)     CMC DJD(carpometacarpal degenerative joint disease), localized primary 3/5/2013     -donor kidney transplant 3/20/2014     Depressive disorder 11/15/2012     DM type 2 (diabetes mellitus, type 2) (H) 2013     Encounter for long-term (current) use of other medications 2015     Family history of tremor 10/17/2011     Generalized anxiety disorder 11/15/2012     Glaucoma      Hyperlipidemia 10/15/2011     Hyperparathyroidism, secondary (H) 2015     Hypertension     resolved     Immunosuppressed status (H) 3/20/2014     Major depressive disorder, recurrent episode, moderate (H) 11/15/2012     Obesity (BMI 30-39.9)      OP (osteoporosis) T score -3.8 2009 T-score -3.7      LION (obstructive sleep apnoea) 10/15/2012    intol to cpa     Pain in joint, forearm -- L unhealed Fx 2013     Premature menopause age 37 7/10/2012    OCP (vaginal bldg)-->HT which she stopped 2 mo later documented at 2007 visit (age 51).      Restless leg syndrome      Rib fractures 2013     Sensory loss 10/17/2011    Bottom of feet; uncertain if there is a neuropathy per notes.       Stiffness of joint, not  elsewhere classified, hand 3/5/2013     Tremor 10/15/2011    head     Uncomplicated asthma      Past Surgical History:   Procedure Laterality Date     ABDOMEN SURGERY       ANKLE SURGERY       C TRANSPLANTATION OF KIDNEY  3/2014     C/SECTION, LOW TRANSVERSE      x 2     CHOLECYSTECTOMY  1990     COLONOSCOPY       ESOPHAGOSCOPY, GASTROSCOPY, DUODENOSCOPY (EGD), COMBINED N/A 5/19/2015    Procedure: COMBINED ESOPHAGOSCOPY, GASTROSCOPY, DUODENOSCOPY (EGD);  Surgeon: Sky Davey MD;  Location:  GI     ESOPHAGOSCOPY, GASTROSCOPY, DUODENOSCOPY (EGD), COMBINED N/A 5/19/2015    Procedure: COMBINED ESOPHAGOSCOPY, GASTROSCOPY, DUODENOSCOPY (EGD), BIOPSY SINGLE OR MULTIPLE;  Surgeon: Sky Davey MD;  Location:  GI     EYE SURGERY       LAPAROSCOPY, SURGICAL; REPAIR INCISIONAL OR VENTRAL HERNIA       ORTHOPEDIC SURGERY       HERMINIA EN Y BOWEL  1990     WRIST SURGERY       Medications:  Current Outpatient Prescriptions   Medication Sig Dispense Refill     acetaminophen (TYLENOL) 325 MG tablet Take 325-650 mg by mouth as needed       acetylcysteine (N-ACETYL-L-CYSTEINE) 600 MG CAPS capsule Take 2 400 mg caps two times daily for total daily dose of 800 mg 30 capsule      albuterol (PROAIR HFA/PROVENTIL HFA/VENTOLIN HFA) 108 (90 BASE) MCG/ACT Inhaler Inhale 2 puffs into the lungs every 6 hours as needed for shortness of breath / dyspnea or wheezing 1 Inhaler 11     ARIPiprazole (ABILIFY) 2 MG tablet Take 0.5 tablets (1 mg) by mouth daily 15 tablet 3     aspirin EC 81 MG EC tablet Take 1 tablet (81 mg) by mouth daily 30 tablet 5     blood glucose monitoring (ACCU-CHEK RALPH PLUS) meter device kit   0     blood glucose monitoring (NO BRAND SPECIFIED) meter device kit Use to test blood sugar 2 times daily or as directed. 1 kit 0     blood glucose monitoring (NO BRAND SPECIFIED) test strip Use to test blood sugars 2 times daily or as directed 100 strip 11     blood glucose monitoring (SOFTCLIX) lancets Use to test  blood sugar 2 times daily or as directed. 1 Box 11     Cholecalciferol (VITAMIN D) 1000 UNITS capsule 2,000 Units  30 capsule      cyanocolbalamin (VITAMIN  B-12) 1000 MCG tablet Take 1 tablet by mouth daily. (Patient taking differently: Take 1,000 mcg by mouth every other day ) 30 tablet 0     cycloSPORINE modified (GENERIC EQUIVALENT) 25 MG capsule Take 5 capsules (125 mg) by mouth 2 times daily 300 capsule 6     cycloSPORINE modified (GENERIC EQUIVALENT) 25 MG capsule TAKE 5 CAPSULES (125MG) BY MOUTH TWO TIMES A DAY (Patient not taking: Reported on 5/1/2018) 300 capsule 6     diphenhydrAMINE (BENADRYL) 25 MG capsule Take 25 mg by mouth At Bedtime.       econazole nitrate 1 % cream Apply topically daily (Patient not taking: Reported on 5/1/2018) 85 g 3     furosemide (LASIX) 20 MG tablet Take 1 tablet (20 mg) by mouth daily 90 tablet 3     latanoprost (XALATAN) 0.005 % ophthalmic solution Place 1 drop into both eyes At Bedtime 2.5 mL 2     metFORMIN (GLUCOPHAGE-XR) 500 MG 24 hr tablet Take 3 tablets (1,500 mg) by mouth daily (with dinner) 90 tablet 11     mycophenolate (GENERIC EQUIVALENT) 250 MG capsule Take 4 capsules (1,000 mg) by mouth 2 times daily 240 capsule 11     omeprazole (PRILOSEC) 40 MG capsule Take 1 capsule (40 mg) by mouth daily 90 capsule 3     ondansetron (ZOFRAN-ODT) 4 MG ODT tab Take 1 tablet (4 mg) by mouth every 6 hours as needed 20 tablet 3     order for DME Walker with front wheels and a seat. 1 Units 0     Polyvinyl Alcohol-Povidone (REFRESH OP) Apply to eye as needed Both eyes       prazosin (MINIPRESS) 5 MG capsule Take 3 capsules (15 mg) by mouth At Bedtime 90 capsule 3     simvastatin (ZOCOR) 20 MG tablet Take 1 tablet (20 mg) by mouth At Bedtime 90 tablet 3     sulfamethoxazole-trimethoprim (BACTRIM/SEPTRA) 400-80 MG per tablet Take 1 tablet by mouth daily 90 tablet 3     topiramate (TOPAMAX) 200 MG tablet Take 1 tablet (200 mg) by mouth 2 times daily 60 tablet 5     Vaginal Lubricant  "(REPLENS) GEL Use vaginally as needed. Can use up to 3 times per week. 35 g 11     vilazodone (VIIBRYD) 40 MG TABS tablet Take 1 tablet (40 mg) by mouth daily 30 tablet 3      Allergies:  Allergies   Allergen Reactions     Percocet [Oxycodone-Acetaminophen] Nausea and Vomiting     Novocain [Procaine Hcl] Hives     Had reaction 25 years ago to old renetta. Pt reports multiple injections of lidocaine since then without reaction.  Tolerated lidocaine injection today without difficulty.  Osmar Mark MD IR Service.     Social History:  She lives with her  in [facility], no smoking or alcohol use. Worked as teacher.     Review of System:  ROS was done and was negative except mention above (gen/endo/MSK reviewed)    Physical Examination:  Vital signs:   /77 (BP Location: Right arm, Patient Position: Sitting, Cuff Size: Adult Regular)  Pulse 96  Ht 1.549 m (5' 1\")  Wt 71.5 kg (157 lb 9.6 oz)  BMI 29.78 kg/m2  Estimated body mass index is 29.78 kg/(m^2) as calculated from the following:    Height as of this encounter: 1.549 m (5' 1\").    Weight as of this encounter: 71.5 kg (157 lb 9.6 oz).  SHAUNNA: Middle aged lady in no acute distress  HEENT: Head normal, eomi, nl scleral icteris or proptosis  GI: nondistended  NEURO: fully A and O, no resting tremor, nl gait  Extremities: hair distribution, no edema/c/c  SKIN: No skin changes.  PSYCH: Normal mood, pleasant affect  Spine non tender.     Laboratory Work up:   Last , K 4.2, Cr 1.06 in 11/7/16    ATTENTION:  Easy to read DXA/VFA reports (formatted and presented as tables)   can be found in EPIC under Chart review >  Imaging tab >  DXA    [facility]    [address] [place] [id number]  Phone: [phone]   Fax: [phone]     final    Patient name:                            ANITA ULRICH ([id number] )  Patient demographics:               [age] year [age] White Female of 61.0 in. height and " 190.0 lbs. weight  Ordering provider:                     MAGI ENG  History:                                     BARIATRIC SURGERY, family hx of hip fractures, family hx of osteoporosis, Hyperparathyroid, kidney transplant recipient, LOW BONE DENSITY, POSTMENOPAUSAL status.  There is a history of fracture.  Current treatments:                   calcium, vitamin D, transplant medications  Previous treatments:                 ibandronate  Scan:                                        DXA exam ([id number] ): [employer]    Exam date:                               10/13/2016    Comparison:                             10/13/2016  10/13/2014  09/21/2009  11/17/2008  01/19/2007    Dual energy x-ray absorptiometry (DXA) results:      Region  Date  BMD  T - score  Z - score  BMD change from baseline  BMD % change from baseline  BMD change from previous  BMD % change from previous    L1-L4  10/13/2016  0.706 g/cm   -4.0  -3.5  -0.035 g/cm   -4.7%  0.069 g/cm   10.8%    L1-L4  10/13/2014  0.637 g/cm                 L1-L4  09/21/2009  0.718 g/cm                 L1-L4  11/17/2008  0.759 g/cm                 L1-L4  01/19/2007  0.741 g/cm       baseline  baseline  -  -        Neck Left  10/13/2016  0.806 g/cm   -1.7  -0.9            Neck Left  10/13/2014  0.765 g/cm                 Neck Left  09/21/2009  0.907 g/cm                 Neck Left  11/17/2008  0.895 g/cm                 Neck Left  01/19/2007  0.941 g/cm                     Total Left  10/13/2016  0.818 g/cm   -1.5  -1.1  -0.139 g/cm   -14.5%  0.013 g/cm   1.6%    Total Left  10/13/2014  0.805 g/cm                 Total Left  09/21/2009  0.884 g/cm                 Total Left  11/17/2008  0.924 g/cm                 Total Left  01/19/2007  0.957 g/cm       baseline  baseline  -  -                                                Neck Right  10/13/2016  0.788 g/cm   -1.8  -1.1            Neck Right  10/13/2014  0.790 g/cm                 Neck Right   09/21/2009  0.858 g/cm                 Neck Right  11/17/2008  0.825 g/cm                 Neck Right  01/19/2007  0.952 g/cm                     Total Right  10/13/2016  0.841 g/cm   -1.3  -1.0  -0.124 g/cm   -12.8%  0.014 g/cm   1.7%    Total Right  10/13/2014  0.827 g/cm                 Total Right  09/21/2009  0.892 g/cm                 Total Right  11/17/2008  0.903 g/cm                 Total Right  01/19/2007  0.965 g/cm       baseline  baseline  -  -        B2-B3  10/13/2016  0.222 g/cm   -4.5  -3.2  baseline  baseline  N/A  N/A       ?????????????????  Conclusions:    Based on the lowest and valid T-score of -4.0  at the level of the lumbar spine  according to WHO criteria for postmenopausal females and men age 50 and over (see Ref. #1), this individual has OSTEOPOROSIS . (see Ref. #4) The risk of osteoporotic fracture increases approximately 2-fold for each 1.0 SD decrease in T-score.  Low bone density is not the only risk factor for fracture; other factors to consider would include patient's age, risk of falling, risk of injury, previous osteoporotic fracture, family history of osteoporosis, etc.    Of note, the lateral lumbar spine is not used in diagnosis of osteoporosis/low bone density but may be useful in monitoring.    COMPARISONS WERE MADE ONLY TO THE PREVOIUS AND BASELINE STUDIES:  Taking into account the precision errors for this center, the calculated change in BMD at the level of the lumbar spine and bilateral total hips (bone loss) as shown is significant (see Ref.#7) compared with 2007.      Taking into account the precision errors for this center, the calculated change in BMD at the level of the lumbar spine (bone gain)  as shown is significant (see Ref.#7)  compared with 2014.  Please note that the differential diagnosis of BMD increase in the spine includes improvement due to pharmacotherapy vs inter-current progression of spine degeneration or fracture.      Clinical correlation is  recommended based on the history of past fracture (see Ref #8)    Bone densitometry cannot rule out secondary causes of bone loss.  Therefore, further metabolic testing to look for secondary causes of low BMD should be performed if indicated.    Repeat DXA testing in 2 years could be considered.        Clinical correlation recommended.      Principal result :  Alanis Lucero MD, [facility]  Division of Diabetes, Endocrinology and Metabolism  [facility]  [phone]

## 2018-05-07 NOTE — LETTER
5/7/2018       RE: Elizabeth Palma  [id number]   [facility] [address]     Dear Colleague,    Thank you for referring your patient, Elizabeth Palma, to the [facility] ENDOCRINOLOGY at [facility]. Please see a copy of my visit note below.    Diabetes, Endocrinology and Metabolism Clinic -return visit      Reason for visit: Elizabeth Palma is 59 year old female referred to endocrine clinic for follow up of osteoporosis with compression fracture treated with reclast since 2015. Wrist fracture in summer 2017 but limited treatment options due to renal transplant and PTH elevation.       Assessment and Plan:  Osteoporosis with 2.5 inch height loss.  Type 2 DM and neuropathy  History of gastric bypass  hyperparathyroidism--history of renal txp with nl kidney function and continues with mild PTH elevation, nl renal function. Due to high normal Ca, this was thought to be tertiary but with transplant and improvement in renal function, PTH level has been steadily improving.    Plans--  VFA = T9 compression fracture.   6 month DXA and Calcium labs followed by an appointment to review results and further planning.   Given wrist fracture which is new, other option would be Denusomab. Teriparatide is not an option due to hyperpara.   Continue Vit D3 daily  Ca 500 mg BID ( aim for 1200 mg daily intake)  Zoledronic acid infusion annually cycle 4 of 5 planned treatment in 2018.   Continue Metformin 1500 mg daily. Patient following up with Dr Sheridan.      For diet, rule of thumb--  1 cup of milk or yogurt = 1.5 oz hard cheese = about 300-350 mg elemental calcium; check the label on other potential sources like fortified almond or soy milk  can use a calcium carbonate or calcium citrate supplement to help reach that intake per day if not in the diet alone    Return in 6 months     Lizbet Byers MD  [id number]  Endocrinology Service    ==============    Interval history  She had a fall  "in July.  At that time she was playing mini golf.  She lost balance and landed on her knees.  She is not sure if put her wrist for balance but later started to develop pain on the left wrist.  X-ray confirmed a fracture that required surgery.  This was done in December 2017.    She has been taking calcium and vitamin D.  Last Reclast infusion was done on November 28, 2017.  No kidney stones.  Steadily losing weight.  Total 60 pounds.  On topiramate followed by Dr. Irene.  No back pain, height loss.      =======================    History of presenting illness: Ms. Palma has significant past medical history of DM type 2, dyslipidemia, ESRD on HD, PCKD, HTN, s/p gastric bypass, pre mature ovarian failure, lactose intolerance. She has long standing history of osteoporosis with last DXA scan in 2009 showed t score of - 3.8 at spine and z- score of - 4.0. She was started on HD in 3/2012. Prior to that she had received treatment with alendronate and \" 3 monthly IV\" likely ibandronate but was discontinued because of progressively decline in her kidney functions. She sustained multiple fracture including bilateral wrists and ankles.    She has multiple risk factors for osteoporosis including pre mature menopause, lactose intolerance and not being able to take Ca, gastric surgery and ESRD. She was explained that CKD related bone disease is low turn over condition and usually is not treated with bisphosphonate. Had DEXA 10/14 with significant interim bone loss so was re-referred to Endocrine.    Transplant done 3 years ago with improvement in kidney function and PTH level.     Reclast infusion #1 done June 2015, #2 done in September 2016, 3rd infusion November 2017.  Repeat DXA was done in 10/13/16 that showed improvement in lumbar verterbrae and hip compared to the last bone density.     Medications: This is according to her records.  She is not sure about the dose.  Vitamin D3 dose is 3000 IU daily  Also getting " calcium + D at 500 mg calcium twice daily and an additional dairy serving daily    Type 2 Diabetes Mellitus with neuroapathy  Followed by PCP Dr Sheridan and doing well with control  Recently taken of Januvia due to ? Low BG  For DM2 remains on metformin XR 1500 mg daily with excellent at target HbA1c.    No polyuria or polydipsia.     Morbid Obesity  Followed at wt loss clinic here by Dr. Irene--her wt is down about 29#   On Topiramate 200 mg daily   - states that she feels tired and somnolent but is happy with weight loss.    ROS is otherwise unremarkable.       PM/SH:  Past Medical History:   Diagnosis Date     Abnormal MRI, cervical spine 10/15/2011    2011; mild changes noted. Study done for left arm symptoms Impression:  1. Mild multilevel degenerative disc disease with no significant canal or neural stenosis seen. motion artifact on the STIR images in these are not interpretable. The remaining images were interpreted      Autosomal dominant polycystic kidney disease 2011     (Problem list name updated by automated process. Provider to review and confirm.)     CMC DJD(carpometacarpal degenerative joint disease), localized primary 3/5/2013     -donor kidney transplant 3/20/2014     Depressive disorder 11/15/2012     DM type 2 (diabetes mellitus, type 2) (H) 2013     Encounter for long-term (current) use of other medications 2015     Family history of tremor 10/17/2011     Generalized anxiety disorder 11/15/2012     Glaucoma      Hyperlipidemia 10/15/2011     Hyperparathyroidism, secondary (H) 2015     Hypertension     resolved     Immunosuppressed status (H) 3/20/2014     Major depressive disorder, recurrent episode, moderate (H) 11/15/2012     Obesity (BMI 30-39.9)      OP (osteoporosis) T score -3.8 2009 T-score -3.7      LION (obstructive sleep apnoea) 10/15/2012    intol to cpa     Pain in joint, forearm -- L unhealed Fx 2013     Premature menopause age 37  7/10/2012    OCP (vaginal bldg)-->HT which she stopped 2 mo later documented at Jan 12, 2007 visit (age 51).      Restless leg syndrome      Rib fractures 4/13/2013     Sensory loss 10/17/2011    Bottom of feet; uncertain if there is a neuropathy per notes.       Stiffness of joint, not elsewhere classified, hand 3/5/2013     Tremor 10/15/2011    head     Uncomplicated asthma      Past Surgical History:   Procedure Laterality Date     ABDOMEN SURGERY       ANKLE SURGERY       C TRANSPLANTATION OF KIDNEY  3/2014     C/SECTION, LOW TRANSVERSE      x 2     CHOLECYSTECTOMY  1990     COLONOSCOPY       ESOPHAGOSCOPY, GASTROSCOPY, DUODENOSCOPY (EGD), COMBINED N/A 5/19/2015    Procedure: COMBINED ESOPHAGOSCOPY, GASTROSCOPY, DUODENOSCOPY (EGD);  Surgeon: Sky Davey MD;  Location:  GI     ESOPHAGOSCOPY, GASTROSCOPY, DUODENOSCOPY (EGD), COMBINED N/A 5/19/2015    Procedure: COMBINED ESOPHAGOSCOPY, GASTROSCOPY, DUODENOSCOPY (EGD), BIOPSY SINGLE OR MULTIPLE;  Surgeon: Sky Davey MD;  Location:  GI     EYE SURGERY       LAPAROSCOPY, SURGICAL; REPAIR INCISIONAL OR VENTRAL HERNIA       ORTHOPEDIC SURGERY       HERMINIA EN Y BOWEL  1990     WRIST SURGERY       Medications:  Current Outpatient Prescriptions   Medication Sig Dispense Refill     acetaminophen (TYLENOL) 325 MG tablet Take 325-650 mg by mouth as needed       acetylcysteine (N-ACETYL-L-CYSTEINE) 600 MG CAPS capsule Take 2 400 mg caps two times daily for total daily dose of 800 mg 30 capsule      albuterol (PROAIR HFA/PROVENTIL HFA/VENTOLIN HFA) 108 (90 BASE) MCG/ACT Inhaler Inhale 2 puffs into the lungs every 6 hours as needed for shortness of breath / dyspnea or wheezing 1 Inhaler 11     ARIPiprazole (ABILIFY) 2 MG tablet Take 0.5 tablets (1 mg) by mouth daily 15 tablet 3     aspirin EC 81 MG EC tablet Take 1 tablet (81 mg) by mouth daily 30 tablet 5     blood glucose monitoring (ACCU-CHEK RALPH PLUS) meter device kit   0     blood glucose monitoring  (NO BRAND SPECIFIED) meter device kit Use to test blood sugar 2 times daily or as directed. 1 kit 0     blood glucose monitoring (NO BRAND SPECIFIED) test strip Use to test blood sugars 2 times daily or as directed 100 strip 11     blood glucose monitoring (SOFTCLIX) lancets Use to test blood sugar 2 times daily or as directed. 1 Box 11     Cholecalciferol (VITAMIN D) 1000 UNITS capsule 2,000 Units  30 capsule      cyanocolbalamin (VITAMIN  B-12) 1000 MCG tablet Take 1 tablet by mouth daily. (Patient taking differently: Take 1,000 mcg by mouth every other day ) 30 tablet 0     cycloSPORINE modified (GENERIC EQUIVALENT) 25 MG capsule Take 5 capsules (125 mg) by mouth 2 times daily 300 capsule 6     cycloSPORINE modified (GENERIC EQUIVALENT) 25 MG capsule TAKE 5 CAPSULES (125MG) BY MOUTH TWO TIMES A DAY (Patient not taking: Reported on 5/1/2018) 300 capsule 6     diphenhydrAMINE (BENADRYL) 25 MG capsule Take 25 mg by mouth At Bedtime.       econazole nitrate 1 % cream Apply topically daily (Patient not taking: Reported on 5/1/2018) 85 g 3     furosemide (LASIX) 20 MG tablet Take 1 tablet (20 mg) by mouth daily 90 tablet 3     latanoprost (XALATAN) 0.005 % ophthalmic solution Place 1 drop into both eyes At Bedtime 2.5 mL 2     metFORMIN (GLUCOPHAGE-XR) 500 MG 24 hr tablet Take 3 tablets (1,500 mg) by mouth daily (with dinner) 90 tablet 11     mycophenolate (GENERIC EQUIVALENT) 250 MG capsule Take 4 capsules (1,000 mg) by mouth 2 times daily 240 capsule 11     omeprazole (PRILOSEC) 40 MG capsule Take 1 capsule (40 mg) by mouth daily 90 capsule 3     ondansetron (ZOFRAN-ODT) 4 MG ODT tab Take 1 tablet (4 mg) by mouth every 6 hours as needed 20 tablet 3     order for DME Walker with front wheels and a seat. 1 Units 0     Polyvinyl Alcohol-Povidone (REFRESH OP) Apply to eye as needed Both eyes       prazosin (MINIPRESS) 5 MG capsule Take 3 capsules (15 mg) by mouth At Bedtime 90 capsule 3     simvastatin (ZOCOR) 20 MG  "tablet Take 1 tablet (20 mg) by mouth At Bedtime 90 tablet 3     sulfamethoxazole-trimethoprim (BACTRIM/SEPTRA) 400-80 MG per tablet Take 1 tablet by mouth daily 90 tablet 3     topiramate (TOPAMAX) 200 MG tablet Take 1 tablet (200 mg) by mouth 2 times daily 60 tablet 5     Vaginal Lubricant (REPLENS) GEL Use vaginally as needed. Can use up to 3 times per week. 35 g 11     vilazodone (VIIBRYD) 40 MG TABS tablet Take 1 tablet (40 mg) by mouth daily 30 tablet 3      Allergies:  Allergies   Allergen Reactions     Percocet [Oxycodone-Acetaminophen] Nausea and Vomiting     Novocain [Procaine Hcl] Hives     Had reaction 25 years ago to old renetta. Pt reports multiple injections of lidocaine since then without reaction.  Tolerated lidocaine injection today without difficulty.  Osmar Mark MD IR Service.     Social History:  She lives with her  in [facility], no smoking or alcohol use. Worked as teacher.     Review of System:  ROS was done and was negative except mention above (gen/endo/MSK reviewed)    Physical Examination:  Vital signs:   /77 (BP Location: Right arm, Patient Position: Sitting, Cuff Size: Adult Regular)  Pulse 96  Ht 1.549 m (5' 1\")  Wt 71.5 kg (157 lb 9.6 oz)  BMI 29.78 kg/m2  Estimated body mass index is 29.78 kg/(m^2) as calculated from the following:    Height as of this encounter: 1.549 m (5' 1\").    Weight as of this encounter: 71.5 kg (157 lb 9.6 oz).  SHAUNNA: Middle aged lady in no acute distress  HEENT: Head normal, eomi, nl scleral icteris or proptosis  GI: nondistended  NEURO: fully A and O, no resting tremor, nl gait  Extremities: hair distribution, no edema/c/c  SKIN: No skin changes.  PSYCH: Normal mood, pleasant affect  Spine non tender.     Laboratory Work up:   Last , K 4.2, Cr 1.06 in 11/7/16    ATTENTION:  Easy to read DXA/VFA reports (formatted and presented as tables)   can be found in EPIC under Chart review >  Imaging tab >  DXA    [facility]    [address] [place] [id number]  Phone: [phone]   Fax: [phone]     final    Patient name:                            ANITA ULRICH ([id number] )  Patient demographics:               [age] year [age] White Female of 61.0 in. height and 190.0 lbs. weight  Ordering provider:                     MAGI ENG  History:                                     BARIATRIC SURGERY, family hx of hip fractures, family hx of osteoporosis, Hyperparathyroid, kidney transplant recipient, LOW BONE DENSITY, POSTMENOPAUSAL status.  There is a history of fracture.  Current treatments:                   calcium, vitamin D, transplant medications  Previous treatments:                 ibandronate  Scan:                                        DXA exam ([id number] ): [facility]    Exam date:                               10/13/2016    Comparison:                             10/13/2016  10/13/2014  09/21/2009  11/17/2008  01/19/2007    Dual energy x-ray absorptiometry (DXA) results:      Region  Date  BMD  T - score  Z - score  BMD change from baseline  BMD % change from baseline  BMD change from previous  BMD % change from previous    L1-L4  10/13/2016  0.706 g/cm   -4.0  -3.5  -0.035 g/cm   -4.7%  0.069 g/cm   10.8%    L1-L4  10/13/2014  0.637 g/cm                 L1-L4  09/21/2009  0.718 g/cm                 L1-L4  11/17/2008  0.759 g/cm                 L1-L4  01/19/2007  0.741 g/cm       baseline  baseline  -  -        Neck Left  10/13/2016  0.806 g/cm   -1.7  -0.9            Neck Left  10/13/2014  0.765 g/cm                 Neck Left  09/21/2009  0.907 g/cm                 Neck Left  11/17/2008  0.895 g/cm                 Neck Left  01/19/2007  0.941 g/cm                     Total Left  10/13/2016  0.818 g/cm   -1.5  -1.1  -0.139 g/cm   -14.5%  0.013 g/cm   1.6%    Total Left  10/13/2014  0.805 g/cm                 Total Left  09/21/2009  0.884 g/cm                  Total Left  11/17/2008  0.924 g/cm                 Total Left  01/19/2007  0.957 g/cm       baseline  baseline  -  -                                                Neck Right  10/13/2016  0.788 g/cm   -1.8  -1.1            Neck Right  10/13/2014  0.790 g/cm                 Neck Right  09/21/2009  0.858 g/cm                 Neck Right  11/17/2008  0.825 g/cm                 Neck Right  01/19/2007  0.952 g/cm                     Total Right  10/13/2016  0.841 g/cm   -1.3  -1.0  -0.124 g/cm   -12.8%  0.014 g/cm   1.7%    Total Right  10/13/2014  0.827 g/cm                 Total Right  09/21/2009  0.892 g/cm                 Total Right  11/17/2008  0.903 g/cm                 Total Right  01/19/2007  0.965 g/cm       baseline  baseline  -  -        B2-B3  10/13/2016  0.222 g/cm   -4.5  -3.2  baseline  baseline  N/A  N/A       ?????????????????  Conclusions:    Based on the lowest and valid T-score of -4.0  at the level of the lumbar spine  according to WHO criteria for postmenopausal females and men age 50 and over (see Ref. #1), this individual has OSTEOPOROSIS . (see Ref. #4) The risk of osteoporotic fracture increases approximately 2-fold for each 1.0 SD decrease in T-score.  Low bone density is not the only risk factor for fracture; other factors to consider would include patient's age, risk of falling, risk of injury, previous osteoporotic fracture, family history of osteoporosis, etc.    Of note, the lateral lumbar spine is not used in diagnosis of osteoporosis/low bone density but may be useful in monitoring.    COMPARISONS WERE MADE ONLY TO THE PREVOIUS AND BASELINE STUDIES:  Taking into account the precision errors for this center, the calculated change in BMD at the level of the lumbar spine and bilateral total hips (bone loss) as shown is significant (see Ref.#7) compared with 2007.      Taking into account the precision errors for this center, the calculated change in BMD at the level of the lumbar  spine (bone gain)  as shown is significant (see Ref.#7)  compared with 2014.  Please note that the differential diagnosis of BMD increase in the spine includes improvement due to pharmacotherapy vs inter-current progression of spine degeneration or fracture.      Clinical correlation is recommended based on the history of past fracture (see Ref #8)    Bone densitometry cannot rule out secondary causes of bone loss.  Therefore, further metabolic testing to look for secondary causes of low BMD should be performed if indicated.    Repeat DXA testing in 2 years could be considered.        Clinical correlation recommended.      Principal result :  Alanis Lucero MD, [facility]  Division of Diabetes, Endocrinology and Metabolism  [facility]  [phone]         Again, thank you for allowing me to participate in the care of your patient.      Sincerely,    Lizbet Byers MD

## 2018-05-07 NOTE — MR AVS SNAPSHOT
After Visit Summary   5/7/2018    Elizabeth Palma    MRN: [id number]           Patient Information     Date Of Birth          1955        Visit Information        Provider Department      5/7/2018 2:00 PM Lizbet Byers MD [facility]        Today's Diagnoses     Age-related osteoporosis without current pathological fracture    -  1      Care Instructions    6 month labs followed by appointment. [ early November]     DEXA scan in 6 month prior to the visit.     24 hour urine during the next visit.     24 Hour Urine Collection instruction    Decide a day you want to collect the urine for 24 hours.   On the day of collection, discard the first void urine in the morning.   Start collecting all the urine in the provided container for the entire day and over night.   The next morning when you wake up collect the first void urine in the container and then submit the container to the lab.  Store in the refrigerator     ===================          Follow-ups after your visit        Follow-up notes from your care team     Return in about 6 months (around 11/7/2018).      Your next 10 appointments already scheduled     May 17, 2018  9:00 AM CDT   LAB with  LAB   [facility] Lab ([facility])    [address] [place] [address] [phone]           Please do not eat 10-12 hours before your appointment if you are coming in fasting for labs on lipids, cholesterol, or glucose (sugar). This does not apply to pregnant women. Water, hot tea and black coffee (with nothing added) are okay. Do not drink other fluids, diet soda or chew gum.            May 17, 2018  9:30 AM CDT   (Arrive by 9:15 AM)   NUTRITION VISIT with Francesca Danielle RD   [facility] Surgical Weight Management ([facility])    [address] [place] [address]  4th [facility] [address] [phone]            May 24, 2018  9:15 AM CDT    RETURN GENERAL with Yonas Underwood MD   Eye Clinic ([facility])    Derik Patel [address]  9th Fl Clin 9a  [facility] [address]   [phone]            Jun 05, 2018  1:00 PM CDT   Adult Med Follow UP with Chaparrita Hatch MD   [facility] Psych[facility] ([facility])    [address]  [facility] [address]   [phone]            Jun 18, 2018  1:30 PM CDT   Return Visit with Javier Tuttle DPM   [facility] ([facility])    [id number] [place] [address] [phone]            Jun 19, 2018  9:00 AM CDT   LAB with  LAB   [facility] ([facility])    [address] [place] [id number] [phone]           Please do not eat 10-12 hours before your appointment if you are coming in fasting for labs on lipids, cholesterol, or glucose (sugar). This does not apply to pregnant women. Water, hot tea and black coffee (with nothing added) are okay. Do not drink other fluids, diet soda or chew gum.            Jun 19, 2018  9:30 AM CDT   (Arrive by 9:15 AM)   NUTRITION VISIT with Francesca Danielle RD   [facility] Surgical Weight Management ([facility])    [address] [place] [address]   [phone]            Jul 02, 2018 10:45 AM CDT   (Arrive by 10:30 AM)   Return Visit with Prakash Irene MD   [facility] Medical Weight Management ([facility])    [address] [place] [address]  4th [facility] [address]   [phone]            Jul 31, 2018  9:00 AM CDT   LAB with  LAB    Health Lab ([facility])    [address] [place] [id number] [phone]           Please do not eat 10-12 hours before your appointment if you are coming in fasting for labs on lipids, cholesterol, or glucose (sugar). This does  not apply to pregnant women. Water, hot tea and black coffee (with nothing added) are okay. Do not drink other fluids, diet soda or chew gum.            Jul 31, 2018  9:30 AM CDT   (Arrive by 9:15 AM)   NUTRITION VISIT with Francesca Danielle RD   [facility] Surgical Weight Management ([facility])    [address] [facility]  4th [facility] [address] [phone]              Future tests that were ordered for you today     Open Future Orders        Priority Expected Expires Ordered    Creatinine timed urine Routine 11/3/2018 5/7/2019 5/7/2018    Calcium timed urine with Creat Ratio Routine 11/3/2018 5/7/2019 5/7/2018    1,25 Dihydroxyvitamin D Routine 11/3/2018 5/7/2019 5/7/2018    DX Hip/Pelvis/Spine Routine  5/7/2019 5/7/2018    TSH Routine 10/26/2018 12/3/2018 5/7/2018    T4 free Routine 10/26/2018 12/3/2018 5/7/2018    Parathyroid Hormone Intact Routine 11/3/2018 5/7/2019 5/7/2018    Calcium Routine 11/3/2018 5/7/2019 5/7/2018    Phosphorus Routine 11/3/2018 5/7/2019 5/7/2018    Albumin level Routine 11/3/2018 5/7/2019 5/7/2018    Creatinine Routine 11/3/2018 5/7/2019 5/7/2018    25 Hydroxyvitamin D2 and D3 Routine 11/3/2018 5/7/2019 5/7/2018    Urea nitrogen Routine 11/3/2018 5/7/2019 5/7/2018    Bone specific alk phosphatase Routine 11/3/2018 5/7/2019 5/7/2018            Who to contact     Please call your clinic at [phone] to:    Ask questions about your health    Make or cancel appointments    Discuss your medicines    Learn about your test results    Speak to your doctor            Additional Information About Your Visit        MyChart Information     [employer] gives you secure access to your electronic health record. If you see a primary care provider, you can also send messages to your care team and make appointments. If you have questions, please call your primary care clinic.  If you do not have a primary care provider, please call [phone] and they will  "assist you.      BookBag is an electronic gateway that provides easy, online access to your medical records. With BookBag, you can request a clinic appointment, read your test results, renew a prescription or communicate with your care team.     To access your existing account, please contact your [facility] or call [phone] for assistance.        Care EveryWhere ID     This is your Care EveryWhere ID. This could be used by other organizations to access your [place] medical records  [id number]        Your Vitals Were     Pulse Height BMI (Body Mass Index)             96 1.549 m (5' 1\") 29.78 kg/m2          Blood Pressure from Last 3 Encounters:   05/07/18 130/77   05/01/18 128/68   04/16/18 115/81    Weight from Last 3 Encounters:   05/07/18 71.5 kg (157 lb 9.6 oz)   05/01/18 70.3 kg (155 lb)   04/26/18 70.4 kg (155 lb 4.8 oz)                 Today's Medication Changes          These changes are accurate as of 5/7/18  2:46 PM.  If you have any questions, ask your nurse or doctor.               These medicines have changed or have updated prescriptions.        Dose/Directions    cyanocobalamin 1000 MCG tablet   Commonly known as:  vitamin  B-12   This may have changed:  when to take this   Used for:  CKD (chronic kidney disease) stage 5, GFR less than 15 ml/min (H)        Dose:  1000 mcg   Take 1 tablet by mouth daily.   Quantity:  30 tablet   Refills:  0                Primary Care Provider Office Phone # Fax #    Horacio Sheridan [phone]       [address] [place] [address]        Equal Access to Services     [facility] Center[facility] AH: Hadii riky lehman hadethan Somartín, waaxda luqadaha, qaybta kaalmaty guillermo. So [facility] [phone].    ATENCIÓN: Si habla español, tiene a goetz disposición servicios gratuitos de asistencia lingüística. Llame al [phone].    We comply with applicable federal civil rights laws and " [place] laws. We do not discriminate on the basis of race, color, national origin, age, disability, sex, sexual orientation, or gender identity.            Thank you!     Thank you for choosing [facility]  for your care. Our goal is always to provide you with excellent care. Hearing back from our patients is one way we can continue to improve our services. Please take a few minutes to complete the written survey that you may receive in the mail after your visit with us. Thank you!             Your Updated Medication List - Protect others around you: Learn how to safely use, store and throw away your medicines at www.disposemymeds.org.          This list is accurate as of 5/7/18  2:46 PM.  Always use your most recent med list.                   Brand Name Dispense Instructions for use Diagnosis    acetylcysteine 600 MG Caps capsule    N-ACETYL CYSTEINE    30 capsule    Take 2 400 mg caps two times daily for total daily dose of 800 mg        albuterol 108 (90 Base) MCG/ACT Inhaler    PROAIR HFA/PROVENTIL HFA/VENTOLIN HFA    1 Inhaler    Inhale 2 puffs into the lungs every 6 hours as needed for shortness of breath / dyspnea or wheezing    Exercise-induced asthma       ARIPiprazole 2 MG tablet    ABILIFY    15 tablet    Take 0.5 tablets (1 mg) by mouth daily    Major depressive disorder, recurrent episode, moderate (H)       aspirin 81 MG EC tablet     30 tablet    Take 1 tablet (81 mg) by mouth daily    Kidney replaced by transplant       BENADRYL 25 MG capsule   Generic drug:  diphenhydrAMINE      Take 25 mg by mouth At Bedtime.        blood glucose monitoring lancets     1 Box    Use to test blood sugar 2 times daily or as directed.    Type 2 diabetes mellitus with peripheral neuropathy (H)       * blood glucose monitoring meter device kit    no brand specified    1 kit    Use to test blood sugar 2 times daily or as directed.    Type 2 diabetes mellitus with peripheral neuropathy (H)       * blood  glucose monitoring meter device kit           blood glucose monitoring test strip    no brand specified    100 strip    Use to test blood sugars 2 times daily or as directed    Type 2 diabetes mellitus with peripheral neuropathy (H)       cyanocobalamin 1000 MCG tablet    vitamin  B-12    30 tablet    Take 1 tablet by mouth daily.    CKD (chronic kidney disease) stage 5, GFR less than 15 ml/min (H)       * cycloSPORINE modified 25 MG capsule    GENERIC EQUIVALENT    300 capsule    Take 5 capsules (125 mg) by mouth 2 times daily    Kidney replaced by transplant       * cycloSPORINE modified 25 MG capsule    GENERIC EQUIVALENT    300 capsule    TAKE 5 CAPSULES (125MG) BY MOUTH TWO TIMES A DAY    Kidney replaced by transplant       econazole nitrate 1 % cream     85 g    Apply topically daily    Type II or unspecified type diabetes mellitus with neurological manifestations, not stated as uncontrolled(250.60) (H), Dermatophytosis of foot       furosemide 20 MG tablet    LASIX    90 tablet    Take 1 tablet (20 mg) by mouth daily    Generalized edema       latanoprost 0.005 % ophthalmic solution    XALATAN    2.5 mL    Place 1 drop into both eyes At Bedtime    Mild stage glaucoma(365.71)       metFORMIN 500 MG 24 hr tablet    GLUCOPHAGE-XR    90 tablet    Take 3 tablets (1,500 mg) by mouth daily (with dinner)    Type 2 diabetes mellitus with diabetic polyneuropathy, without long-term current use of insulin (H)       mycophenolate 250 MG capsule    GENERIC EQUIVALENT    240 capsule    Take 4 capsules (1,000 mg) by mouth 2 times daily    Kidney transplanted       omeprazole 40 MG capsule    priLOSEC    90 capsule    Take 1 capsule (40 mg) by mouth daily    Gastroesophageal reflux disease without esophagitis       ondansetron 4 MG ODT tab    ZOFRAN-ODT    20 tablet    Take 1 tablet (4 mg) by mouth every 6 hours as needed    Chronic kidney disease, stage V (H)       order for DME     1 Units    Walker with front wheels and a  seat.    Fall, initial encounter       prazosin 5 MG capsule    MINIPRESS    90 capsule    Take 3 capsules (15 mg) by mouth At Bedtime    Nightmares associated with chronic post-traumatic stress disorder       REFRESH OP      Apply to eye as needed Both eyes        replens Gel     35 g    Use vaginally as needed. Can use up to 3 times per week.    Vaginal dryness       simvastatin 20 MG tablet    ZOCOR    90 tablet    Take 1 tablet (20 mg) by mouth At Bedtime    Chronic kidney disease, stage V (H)       sulfamethoxazole-trimethoprim 400-80 MG per tablet    BACTRIM/SEPTRA    90 tablet    Take 1 tablet by mouth daily    Immunosuppression (H)       topiramate 200 MG tablet    TOPAMAX    60 tablet    Take 1 tablet (200 mg) by mouth 2 times daily    Morbid obesity due to excess calories (H)       TYLENOL 325 MG tablet   Generic drug:  acetaminophen      Take 325-650 mg by mouth as needed        vilazodone 40 MG Tabs tablet    VIIBRYD    30 tablet    Take 1 tablet (40 mg) by mouth daily    Major depressive disorder, recurrent episode, moderate (H)       vitamin D 1000 units capsule     30 capsule    2,000 Units        * Notice:  This list has 4 medication(s) that are the same as other medications prescribed for you. Read the directions carefully, and ask your doctor or other care provider to review them with you.

## 2018-05-10 DIAGNOSIS — H40.9 MILD STAGE GLAUCOMA(365.71): ICD-10-CM

## 2018-05-11 RX ORDER — LATANOPROST 50 UG/ML
SOLUTION/ DROPS OPHTHALMIC
Qty: 7.5 ML | Refills: 0 | Status: SHIPPED | OUTPATIENT
Start: 2018-05-11 | End: 2018-08-29

## 2018-05-11 NOTE — TELEPHONE ENCOUNTER
Medication: xalatan  Last Written Prescription Date:  2/27/18  Last Fill Quantity: 2.5,   # refills: 2    Last Office Visit: 4/25/18  Future Office visit: 5/24/18

## 2018-05-15 DIAGNOSIS — E66.01 MORBID OBESITY DUE TO EXCESS CALORIES (H): ICD-10-CM

## 2018-05-16 RX ORDER — TOPIRAMATE 200 MG/1
200 TABLET, FILM COATED ORAL 2 TIMES DAILY
Qty: 60 TABLET | Refills: 1 | Status: SHIPPED | OUTPATIENT
Start: 2018-05-16 | End: 2018-07-02

## 2018-05-17 ENCOUNTER — ALLIED HEALTH/NURSE VISIT (OUTPATIENT)
Dept: SURGERY | Facility: CLINIC | Age: 63
End: 2018-05-17
Payer: MEDICARE

## 2018-05-17 VITALS — WEIGHT: 155.8 LBS | HEIGHT: 61 IN | BODY MASS INDEX: 29.42 KG/M2

## 2018-05-17 DIAGNOSIS — Z48.298 AFTERCARE FOLLOWING ORGAN TRANSPLANT: ICD-10-CM

## 2018-05-17 LAB
ANION GAP SERPL CALCULATED.3IONS-SCNC: 9 MMOL/L (ref 3–14)
BUN SERPL-MCNC: 11 MG/DL (ref 7–30)
CALCIUM SERPL-MCNC: 9.1 MG/DL (ref 8.5–10.1)
CHLORIDE SERPL-SCNC: 111 MMOL/L (ref 94–109)
CO2 SERPL-SCNC: 22 MMOL/L (ref 20–32)
CREAT SERPL-MCNC: 0.88 MG/DL (ref 0.52–1.04)
CYCLOSPORINE BLD LC/MS/MS-MCNC: 104 UG/L (ref 50–400)
ERYTHROCYTE [DISTWIDTH] IN BLOOD BY AUTOMATED COUNT: 14.3 % (ref 10–15)
GFR SERPL CREATININE-BSD FRML MDRD: 65 ML/MIN/1.7M2
GLUCOSE SERPL-MCNC: 125 MG/DL (ref 70–99)
HCT VFR BLD AUTO: 38.4 % (ref 35–47)
HGB BLD-MCNC: 12 G/DL (ref 11.7–15.7)
MCH RBC QN AUTO: 26.7 PG (ref 26.5–33)
MCHC RBC AUTO-ENTMCNC: 31.3 G/DL (ref 31.5–36.5)
MCV RBC AUTO: 85 FL (ref 78–100)
PLATELET # BLD AUTO: 157 10E9/L (ref 150–450)
POTASSIUM SERPL-SCNC: 3.8 MMOL/L (ref 3.4–5.3)
RBC # BLD AUTO: 4.5 10E12/L (ref 3.8–5.2)
SODIUM SERPL-SCNC: 142 MMOL/L (ref 133–144)
TME LAST DOSE: NORMAL H
WBC # BLD AUTO: 3 10E9/L (ref 4–11)

## 2018-05-17 NOTE — PROGRESS NOTES
"Nutrition Reassessment  Reason For Visit:  Elizabeth Palma is a 62 year-old female with type 2 DM (oral med management) presenting today for nutrition follow-up, s/p \"gastric bypass in 1990 at [facility]\" per Pt report.  She is seeing medical weight management.  She was referred by Dr. Irene.  Note: Pt had a kidney transplant on March 20, 2014.    Pt was accompanied by her .     Anthropometrics:  Height: 61\"  Pre-op Weight: 265 lbs per Pt report; lowest weight after bariatric surgery: 165-170 lbs (25 years ago)  (Pt reported that she initially was at 215 lbs in 2015 prior to seeing writer.)    Current Weight: 155.8  (+0.5 lbs over the past month) with BMI of 29.5.    Current Vitamins/Minerals: 3000 International Units vitamin D/day, Calcium, vitamin C, vitamin B12 daily, B-complex  *Pt stated that she was told not to take other vitamins/minerals by MD.    Nutrition History:  Lactose intolerance ever since bariatric surgery.  Pt stated that she has osteoporosis; however, she stated that her doctors don't want her to take any vitamins or minerals due to her kidney transplant.    Diet/Nutrition Intake Hx: Pt reports her intake varies due to fluctuating appetite. Per pt there are days when she eats 3 meals but on other days she may not eat much at all or nibble on something through out the day. Pt also states her intake is affected by nausea 2/2 her anti-rejection medications. However pt reports she tries to make healthy choices (lower in calorie). Pt is also limited in protein choices that she likes - pt reports she does not take much dairy, does not like beans and lentils, keeps kosher. Advised pt to try to time her meals and eat every 4 hours instead of grazing but pt refused stating she would rather not eat at all.     *Pt stated that she will not record food intake due to the fact that every time she does this, she simply does not eat as she doesn't want to record.  She stated that her therapist strongly " advises against recording food intake for this reason.    Physical Activity Note: Pt reports she has a tendency to break bones so she is limited with what exercises she does. Pt states there is a long hallway in the building that she needs to walk when going out. Pt states she does not walk for exercises but walks only to get ADLs done.      Progress with Previous Goals:    1. Avoid grazing through, Eat 3 meals with lean protein at each meal, along with a non-starchy vegetable or whole fruit, up to 1/2 c carb at a meal.- Continues to do this to the best of her ability.  She stated that her  needs to gain weight, so she may have eaten a little more calories as of late.   -Try hard-boiled eggs to put on your salad or to have later in the day to make up for skipping breakfast.    2. If needing a snack, have vegetables (give at least 2 hours between a meals and a snack). - Meeting.  3. Walk 5-10 min daily, increasing as able.- Meeting.     Nutrition Prescription:  Grams Protein: 60 (minimum)  Amount of Fluid: 48-64 oz    Nutrition Diagnosis  Previous: Overweight/Obesity related to excessive energy intake as evidenced by BMI > 25.  - continues.    Intervention  Intervention At Appointment:  Materials/Education provided:  Praised Pt on maintaining weight lost by continuing positive changes made to lifestyle.  Reviewed previous goals.  Discussed challenges surrounding getting her  to gain weight while continuing to work on losing weight herself.  Gave encouragement and support.    Patient Understanding: good  Expected Compliance: good with continued RD follow up.    Goals:   1. Avoid grazing through, Eat 3 meals with lean protein at each meal, along with a non-starchy vegetable or whole fruit, up to 1/2 c carb at a meal.   -Try hard-boiled eggs to put on your salad or to have later in the day to make up for skipping breakfast.    2. If needing a snack, have vegetables (give at least 2 hours between a meals  and a snack).  3. Walk 5-10 min daily, increasing as able.    Follow-Up: 1 month    Time spent with patient: 15 minutes.  Francesca Danielle MS, SUJATAN, LDN, CLT  Pager: [phone]

## 2018-05-17 NOTE — MR AVS SNAPSHOT
MRN:[id number]                      After Visit Summary   5/17/2018    Elizabeth Palma    MRN: [id number]           Visit Information        Provider Department      5/17/2018 9:30 AM Francesca Danielle RD [facility] Surgical Weight Management        Your next 10 appointments already scheduled     May 24, 2018  9:15 AM CDT   RETURN GENERAL with Yonas Underwood MD   Eye Clinic ([facility])    [address]  [address] Clin 9a  [facility] [address]   [phone]            Jun 05, 2018  1:00 PM CDT   Adult Med Follow UP with Chaparrita Hatch MD   [facility] Psychiatry ([facility])    [address] [facility] [address] [facility] [address]  [facility] [address] [phone]            Jun 18, 2018  1:30 PM CDT   Return Visit with Javier Tuttle DPM   [facility] ([facility])    [address] [place] [id number] [phone]            Jun 19, 2018  9:00 AM CDT   LAB with  LAB   [facility] Lab ([facility])    [address] [facility]  1st [facility] [address] [phone]           Please do not eat 10-12 hours before your appointment if you are coming in fasting for labs on lipids, cholesterol, or glucose (sugar). This does not apply to pregnant women. Water, hot tea and black coffee (with nothing added) are okay. Do not drink other fluids, diet soda or chew gum.            Jun 19, 2018  9:30 AM CDT   (Arrive by 9:15 AM)   NUTRITION VISIT with SUJATA Stapleton [facility] Surgical Weight Management ([facility])    [address] [facility]  4th [facility] [address] [phone]            Jul 02, 2018 10:45 AM CDT   (Arrive by 10:30 AM)   Return Visit with Prakash Irene MD   [facility] Medical Weight Management ([facility])    [address] [facility]  4th [facility] [address]    [phone]            Jul 31, 2018  9:00 AM CDT   LAB with  LAB    Health Lab ([facility])    [address] [place] [id number] [phone]           Please do not eat 10-12 hours before your appointment if you are coming in fasting for labs on lipids, cholesterol, or glucose (sugar). This does not apply to pregnant women. Water, hot tea and black coffee (with nothing added) are okay. Do not drink other fluids, diet soda or chew gum.            Jul 31, 2018  9:30 AM CDT   (Arrive by 9:15 AM)   NUTRITION VISIT with Francesca Danielle RD   [facility] Surgical Weight Management ([facility])    [address] [place] [id number] [phone]            Nov 05, 2018 12:00 PM CST   DX HIP/PELVIS/SPINE with DX1   [facility] ([facility])    [address] [place] [id number] [phone]           Please do not take any of the following 24 hours prior to the day of your exam: vitamins, calcium tablets, antacids.  If possible, please wear clothes without metal (snaps, zippers). A sweatsuit works well.            Nov 05, 2018  1:00 PM CST   (Arrive by 12:45 PM)   RETURN ENDOCRINE with Lizbet Byers MD   [facility] Endocrinology ([facility])    [address] [place] [id number] [phone]              Care Instructions    Goals:   1. Avoid grazing through, Eat 3 meals with lean protein at each meal, along with a non-starchy vegetable or whole fruit, up to 1/2 c carb at a meal.   -Try hard-boiled eggs to put on your salad or to have later in the day to make up for skipping breakfast.    2. If needing a snack, have vegetables (give at least 2 hours between a meals and a snack).  3. Walk 5-10 min daily, increasing as able.         MyChart Information     [employer] gives you secure  access to your electronic health record. If you see a primary care provider, you can also send messages to your care team and make appointments. If you have questions, please call your primary care clinic.  If you do not have a primary care provider, please call [phone] and they will assist you.      ChaCha is an electronic gateway that provides easy, online access to your medical records. With ChaCha, you can request a clinic appointment, read your test results, renew a prescription or communicate with your care team.     To access your existing account, please contact your [facility] or call [phone] for assistance.        Care EveryWhere ID     This is your Care EveryWhere ID. This could be used by other organizations to access your [place] medical records  [id number]        Equal Access to Services     LINNEA HIDALGO : Jennifer Quevedo, radha aleman, nick horowitz, ty fam. So [facility] [phone].    ATENCIÓN: Si habla español, tiene a goetz disposición servicios gratuitos de asistencia lingüística. Llame al [phone].    We comply with applicable federal civil rights laws and [place] laws. We do not discriminate on the basis of race, color, national origin, age, disability, sex, sexual orientation, or gender identity.

## 2018-05-23 ENCOUNTER — MEDICAL CORRESPONDENCE (OUTPATIENT)
Dept: HEALTH INFORMATION MANAGEMENT | Facility: CLINIC | Age: 63
End: 2018-05-23

## 2018-06-05 ENCOUNTER — OFFICE VISIT (OUTPATIENT)
Dept: PSYCHIATRY | Facility: CLINIC | Age: 63
End: 2018-06-05
Payer: MEDICARE

## 2018-06-05 VITALS
TEMPERATURE: 98.7 F | HEIGHT: 61 IN | HEART RATE: 63 BPM | DIASTOLIC BLOOD PRESSURE: 75 MMHG | SYSTOLIC BLOOD PRESSURE: 127 MMHG | BODY MASS INDEX: 29.34 KG/M2 | WEIGHT: 155.4 LBS

## 2018-06-05 DIAGNOSIS — F51.5 NIGHTMARES ASSOCIATED WITH CHRONIC POST-TRAUMATIC STRESS DISORDER: ICD-10-CM

## 2018-06-05 DIAGNOSIS — F60.81 NARCISSISTIC PERSONALITY DISORDER (H): ICD-10-CM

## 2018-06-05 DIAGNOSIS — F43.12 NIGHTMARES ASSOCIATED WITH CHRONIC POST-TRAUMATIC STRESS DISORDER: ICD-10-CM

## 2018-06-05 DIAGNOSIS — F43.10 POSTTRAUMATIC STRESS DISORDER: ICD-10-CM

## 2018-06-05 DIAGNOSIS — F33.1 MAJOR DEPRESSIVE DISORDER, RECURRENT EPISODE, MODERATE (H): Primary | ICD-10-CM

## 2018-06-05 DIAGNOSIS — F41.1 GENERALIZED ANXIETY DISORDER: ICD-10-CM

## 2018-06-05 ASSESSMENT — PAIN SCALES - GENERAL: PAINLEVEL: MODERATE PAIN (4)
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
## 2018-06-05 NOTE — MR AVS SNAPSHOT
After Visit Summary   6/5/2018    Elizabeth Palma    MRN: [id number]           Patient Information     Date Of Birth          1955        Visit Information        Provider Department      6/5/2018 1:00 PM Chaparrita Hatch MD [facility] Psych[facility]         Follow-ups after your visit        Follow-up notes from your care team     Return in about 4 weeks (around 7/3/2018) for 30 min. MFU.      Your next 10 appointments already scheduled     Jun 18, 2018  1:30 PM CDT   Return Visit with Javier Tuttle DPM   [facility] ([facility])    [address] [place] [id number]   [phone]            Jun 19, 2018  9:00 AM CDT   LAB with  LAB   [facility] ([facility])    [address] [place] [id number] [phone]           Please do not eat 10-12 hours before your appointment if you are coming in fasting for labs on lipids, cholesterol, or glucose (sugar). This does not apply to pregnant women. Water, hot tea and black coffee (with nothing added) are okay. Do not drink other fluids, diet soda or chew gum.            Jun 19, 2018  9:30 AM CDT   (Arrive by 9:15 AM)   NUTRITION VISIT with Francesca Danielle RD   [facility] Surgical Weight Management ([facility])    [address] [place] [address]   [phone]            Jul 02, 2018 10:45 AM CDT   (Arrive by 10:30 AM)   Return Visit with Prakash Irene MD   [facility] Medical [facility] ([facility])    [address] [place] [address]   [phone]            Jul 10, 2018  1:00 PM CDT   Adult Med Follow UP with Chaparrita Hatch MD   [facility] ([facility])    [address] [address]   [phone]            Jul 31, 2018  9:00 AM CDT   LAB with  LAB   [facility] (  [facility])    [address] [place] [id number] [phone]           Please do not eat 10-12 hours before your appointment if you are coming in fasting for labs on lipids, cholesterol, or glucose (sugar). This does not apply to pregnant women. Water, hot tea and black coffee (with nothing added) are okay. Do not drink other fluids, diet soda or chew gum.            Jul 31, 2018  9:30 AM CDT   (Arrive by 9:15 AM)   NUTRITION VISIT with Francesca Danielle RD   [facility] Surgical Weight Management ([facility])    [address] [place] [address]  [address] [facility] [id number] [phone]            Nov 05, 2018 12:00 PM CST   DX HIP/PELVIS/SPINE with UCDX1   [facility] ([facility])    [address] [place] [id number] [phone]           Please do not take any of the following 24 hours prior to the day of your exam: vitamins, calcium tablets, antacids.  If possible, please wear clothes without metal (snaps, zippers). A sweatsuit works well.            Nov 05, 2018  1:00 PM CST   (Arrive by 12:45 PM)   RETURN ENDOCRINE with Lizbet Byers MD   [facility] Endocrinology ([facility])    [address] [place] [address]  [address] [id number] [phone]            Mar 18, 2019  1:30 PM CDT   (Arrive by 1:00 PM)   Return Kidney Transplant with  Kidney/Pancreas Recipient   [facility] Nephrology ([facility])    [address] [place] [address]  [address]  [facility] [id number] [phone]              Who to contact     Please call your clinic at [phone] to:    Ask questions about your health    Make or cancel appointments    Discuss your medicines    Learn about your test results    Speak to your doctor            Additional Information About Your Visit        MyChart Information     [employer] gives you secure  "access to your electronic health record. If you see a primary care provider, you can also send messages to your care team and make appointments. If you have questions, please call your primary care clinic.  If you do not have a primary care provider, please call [phone] and they will assist you.      [employer] is an electronic gateway that provides easy, online access to your medical records. With [employer], you can request a clinic appointment, read your test results, renew a prescription or communicate with your care team.     To access your existing account, please contact your [facility] or call [phone] for assistance.        Care EveryWhere ID     This is your Care EveryWhere ID. This could be used by other organizations to access your [facility] medical records  [id number]        Your Vitals Were     Pulse Temperature Height BMI (Body Mass Index)          63 98.7  F (37.1  C) (Temporal) 5' 1\" (154.9 cm) 29.36 kg/m2         Blood Pressure from Last 3 Encounters:   06/05/18 127/75   05/07/18 130/77   05/01/18 128/68    Weight from Last 3 Encounters:   06/05/18 155 lb 6.4 oz (70.5 kg)   05/17/18 155 lb 12.8 oz (70.7 kg)   05/07/18 157 lb 9.6 oz (71.5 kg)              Today, you had the following     No orders found for display         Today's Medication Changes          These changes are accurate as of 6/5/18  1:31 PM.  If you have any questions, ask your nurse or doctor.               These medicines have changed or have updated prescriptions.        Dose/Directions    cyanocobalamin 1000 MCG tablet   Commonly known as:  vitamin  B-12   This may have changed:  when to take this   Used for:  CKD (chronic kidney disease) stage 5, GFR less than 15 ml/min (H)        Dose:  1000 mcg   Take 1 tablet by mouth daily.   Quantity:  30 tablet   Refills:  0                Primary Care Provider Office Phone # Fax #    Horacio Sheridan [phone]       [address] [place] [address] " [address]  [facility] [address]        Equal Access to Services     [facility] GWEN : Hadii riky lehman amichhaya Vilmaali, wareedda luqadaha, qaybta kalluviaty guillermo. So [facility] [phone].    ATENCIÓN: Si habla español, tiene a goetz disposición servicios gratuitos de asistencia lingüística. Francoisame al [phone].    We comply with applicable federal civil rights laws and [place] laws. We do not discriminate on the basis of race, color, national origin, age, disability, sex, sexual orientation, or gender identity.            Thank you!     Thank you for choosing [facility] [[email or web address]] PSYCHIATRY  for your care. Our goal is always to provide you with excellent care. Hearing back from our patients is one way we can continue to improve our services. Please take a few minutes to complete the written survey that you may receive in the mail after your visit with us. Thank you!             Your Updated Medication List - Protect others around you: Learn how to safely use, store and throw away your medicines at www.disposemymeds.org.          This list is accurate as of 6/5/18  1:31 PM.  Always use your most recent med list.                   Brand Name Dispense Instructions for use Diagnosis    acetylcysteine 600 MG Caps capsule    N-ACETYL CYSTEINE    30 capsule    Take 2 400 mg caps two times daily for total daily dose of 800 mg        albuterol 108 (90 Base) MCG/ACT Inhaler    PROAIR HFA/PROVENTIL HFA/VENTOLIN HFA    1 Inhaler    Inhale 2 puffs into the lungs every 6 hours as needed for shortness of breath / dyspnea or wheezing    Exercise-induced asthma       ARIPiprazole 2 MG tablet    ABILIFY    15 tablet    Take 0.5 tablets (1 mg) by mouth daily    Major depressive disorder, recurrent episode, moderate (H)       aspirin 81 MG EC tablet     30 tablet    Take 1 tablet (81 mg) by mouth daily    Kidney replaced by transplant       BENADRYL 25 MG capsule   Generic drug:  diphenhydrAMINE      Take 25 mg by mouth At  Bedtime.        blood glucose monitoring lancets     1 Box    Use to test blood sugar 2 times daily or as directed.    Type 2 diabetes mellitus with peripheral neuropathy (H)       * blood glucose monitoring meter device kit    no brand specified    1 kit    Use to test blood sugar 2 times daily or as directed.    Type 2 diabetes mellitus with peripheral neuropathy (H)       * blood glucose monitoring meter device kit           blood glucose monitoring test strip    no brand specified    100 strip    Use to test blood sugars 2 times daily or as directed    Type 2 diabetes mellitus with peripheral neuropathy (H)       cyanocobalamin 1000 MCG tablet    vitamin  B-12    30 tablet    Take 1 tablet by mouth daily.    CKD (chronic kidney disease) stage 5, GFR less than 15 ml/min (H)       * cycloSPORINE modified 25 MG capsule    GENERIC EQUIVALENT    300 capsule    Take 5 capsules (125 mg) by mouth 2 times daily    Kidney replaced by transplant       * cycloSPORINE modified 25 MG capsule    GENERIC EQUIVALENT    300 capsule    TAKE 5 CAPSULES (125MG) BY MOUTH TWO TIMES A DAY    Kidney replaced by transplant       econazole nitrate 1 % cream     85 g    Apply topically daily    Type II or unspecified type diabetes mellitus with neurological manifestations, not stated as uncontrolled(250.60) (H), Dermatophytosis of foot       furosemide 20 MG tablet    LASIX    90 tablet    Take 1 tablet (20 mg) by mouth daily    Generalized edema       latanoprost 0.005 % ophthalmic solution    XALATAN    7.5 mL    PLACE 1 DROP INTO BOTH EYES AT BEDTIME    Mild stage glaucoma(365.71)       metFORMIN 500 MG 24 hr tablet    GLUCOPHAGE-XR    90 tablet    Take 3 tablets (1,500 mg) by mouth daily (with dinner)    Type 2 diabetes mellitus with diabetic polyneuropathy, without long-term current use of insulin (H)       mycophenolate 250 MG capsule    GENERIC EQUIVALENT    240 capsule    Take 4 capsules (1,000 mg) by mouth 2 times daily    Kidney  transplanted       omeprazole 40 MG capsule    priLOSEC    90 capsule    Take 1 capsule (40 mg) by mouth daily    Gastroesophageal reflux disease without esophagitis       ondansetron 4 MG ODT tab    ZOFRAN-ODT    20 tablet    Take 1 tablet (4 mg) by mouth every 6 hours as needed    Chronic kidney disease, stage V (H)       order for DME     1 Units    Walker with front wheels and a seat.    Fall, initial encounter       prazosin 5 MG capsule    MINIPRESS    90 capsule    Take 3 capsules (15 mg) by mouth At Bedtime    Nightmares associated with chronic post-traumatic stress disorder       REFRESH OP      Apply to eye as needed Both eyes        replens Gel     35 g    Use vaginally as needed. Can use up to 3 times per week.    Vaginal dryness       simvastatin 20 MG tablet    ZOCOR    90 tablet    Take 1 tablet (20 mg) by mouth At Bedtime    Chronic kidney disease, stage V (H)       sulfamethoxazole-trimethoprim 400-80 MG per tablet    BACTRIM/SEPTRA    90 tablet    Take 1 tablet by mouth daily    Immunosuppression (H)       topiramate 200 MG tablet    TOPAMAX    60 tablet    Take 1 tablet (200 mg) by mouth 2 times daily    Morbid obesity due to excess calories (H)       TYLENOL 325 MG tablet   Generic drug:  acetaminophen      Take 325-650 mg by mouth as needed        vilazodone 40 MG Tabs tablet    VIIBRYD    30 tablet    Take 1 tablet (40 mg) by mouth daily    Major depressive disorder, recurrent episode, moderate (H)       vitamin D 1000 units capsule     30 capsule    2,000 Units        * Notice:  This list has 4 medication(s) that are the same as other medications prescribed for you. Read the directions carefully, and ask your doctor or other care provider to review them with you.

## 2018-06-07 ASSESSMENT — PATIENT HEALTH QUESTIONNAIRE - PHQ9: SUM OF ALL RESPONSES TO PHQ QUESTIONS 1-9: 8

## 2018-06-13 ENCOUNTER — TRANSFERRED RECORDS (OUTPATIENT)
Dept: HEALTH INFORMATION MANAGEMENT | Facility: CLINIC | Age: 63
End: 2018-06-13

## 2018-06-18 ENCOUNTER — OFFICE VISIT (OUTPATIENT)
Dept: PODIATRY | Facility: CLINIC | Age: 63
End: 2018-06-18
Payer: COMMERCIAL

## 2018-06-18 VITALS — SYSTOLIC BLOOD PRESSURE: 124 MMHG | DIASTOLIC BLOOD PRESSURE: 80 MMHG | OXYGEN SATURATION: 98 % | HEART RATE: 69 BPM

## 2018-06-18 DIAGNOSIS — E11.49 TYPE II OR UNSPECIFIED TYPE DIABETES MELLITUS WITH NEUROLOGICAL MANIFESTATIONS, NOT STATED AS UNCONTROLLED(250.60) (H): ICD-10-CM

## 2018-06-18 DIAGNOSIS — L60.2 ONYCHAUXIS: Primary | ICD-10-CM

## 2018-06-18 PROCEDURE — 99212 OFFICE O/P EST SF 10 MIN: CPT | Performed by: PODIATRIST

## 2018-06-18 NOTE — NURSING NOTE
Elizabeth Palma's chief complaint for this visit includes:  Chief Complaint   Patient presents with     RECHECK     Diabetic foot check      PCP: Horacio Sheridan    Referring Provider:  Referred Self, MD  No address on file    /80  Pulse 69  SpO2 98%  Data Unavailable     Do you need any medication refills at today's visit? no

## 2018-06-18 NOTE — MR AVS SNAPSHOT
After Visit Summary   6/18/2018    Elizabeth Palma    MRN: [id number]           Patient Information     Date Of Birth          1955        Visit Information        Provider Department      6/18/2018 1:30 PM Javier Tuttle DPM [facility]        Today's Diagnoses     Onychauxis    -  1    Type II or unspecified type diabetes mellitus with neurological manifestations, not stated as uncontrolled(250.60) (H)          Care Instructions    Thanks for coming today.  Ortho[facility] Medicine Clinic  [address] [place] [address]    To schedule future appointments in Ortho Clinic, you may call [phone].    To schedule ordered imaging by your Provider: Call [facility] at [phone]    Leanplum available online at:   [email or web address]    Please call if any further questions or concerns [phone] and ask for the Orthopedic Department. Clinic hours 8 am to 5 pm.    Return to clinic if symptoms worsen.            Follow-ups after your visit        Your next 10 appointments already scheduled     Jun 19, 2018  9:00 AM CDT   LAB with  LAB   [facility] ([facility])    [address] [place] [address] [phone]           Please do not eat 10-12 hours before your appointment if you are coming in fasting for labs on lipids, cholesterol, or glucose (sugar). This does not apply to pregnant women. Water, hot tea and black coffee (with nothing added) are okay. Do not drink other fluids, diet soda or chew gum.            Jun 19, 2018  9:30 AM CDT   (Arrive by 9:15 AM)   NUTRITION VISIT with Francesca Danielle RD   [facility] ([facility])    [address] [facility]  [address] [facility] [address] [phone]            Jul 02, 2018 10:45 AM CDT   (Arrive by 10:30 AM)   Return Visit with Prakash Irene MD   [facility]  ([facility])    [address] [place] [address]   [phone]            Jul 10, 2018  1:00 PM CDT   Adult Med Follow UP with Chaparrita Hatch MD   [facility] ([facility])    [address]  [facility] [address]   [phone]            Jul 31, 2018  9:00 AM CDT   LAB with  LAB   [facility] ([facility])    [address] [place] [address] [phone]           Please do not eat 10-12 hours before your appointment if you are coming in fasting for labs on lipids, cholesterol, or glucose (sugar). This does not apply to pregnant women. Water, hot tea and black coffee (with nothing added) are okay. Do not drink other fluids, diet soda or chew gum.            Jul 31, 2018  9:30 AM CDT   (Arrive by 9:15 AM)   NUTRITION VISIT with Francesca Danielle RD   [facility] ([facility])    [address] [place] [address]   [phone]            Nov 05, 2018 12:00 PM CST   DX HIP/PELVIS/SPINE with DX1   [facility] ([facility])    [address] [place] [address] [phone]           Please do not take any of the following 24 hours prior to the day of your exam: vitamins, calcium tablets, antacids.  If possible, please wear clothes without metal (snaps, zippers). A sweatsuit works well.            Nov 05, 2018  1:00 PM CST   (Arrive by 12:45 PM)   RETURN ENDOCRINE with Lizbet Byers MD   [facility] ([facility])    [address] [place] [address] [phone]            Mar 18, 2019  1:30 PM CDT   (Arrive by 1:00 PM)   Return Kidney Transplant with  Kidney/Pancreas Recipient   [facility] ([facility])    [address]  [facility]  [address]  [facility] [address] [phone]              Who to contact     If you have questions or need follow up information about today's clinic visit or your schedule please contact [facility] directly at [phone].  Normal or non-critical lab and imaging results will be communicated to you by MyChart, letter or phone within 4 business days after the clinic has received the results. If you do not hear from us within 7 days, please contact the clinic through [email or web address] or phone. If you have a critical or abnormal lab result, we will notify you by phone as soon as possible.  Submit refill requests through Orgoo or call your pharmacy and they will forward the refill request to us. Please allow 3 business days for your refill to be completed.          Additional Information About Your Visit        [email or web address] Information     Orgoo gives you secure access to your electronic health record. If you see a primary care provider, you can also send messages to your care team and make appointments. If you have questions, please call your primary care clinic.  If you do not have a primary care provider, please call [phone] and they will assist you.      Orgoo is an electronic gateway that provides easy, online access to your medical records. With Orgoo, you can request a clinic appointment, read your test results, renew a prescription or communicate with your care team.     To access your existing account, please contact your [facility] or call [phone] for assistance.        Care EveryWhere ID     This is your Care EveryWhere ID. This could be used by other organizations to access your [facility] [place] medical records  [id number]        Your Vitals Were     Pulse Pulse Oximetry                69 98%           Blood Pressure from Last 3 Encounters:   06/18/18 124/80   06/05/18 127/75   05/07/18 130/77    Weight from Last 3 Encounters:    06/05/18 70.5 kg (155 lb 6.4 oz)   05/17/18 70.7 kg (155 lb 12.8 oz)   05/07/18 71.5 kg (157 lb 9.6 oz)              We Performed the Following     TRIM NONDYSTRPHIC NAIL(S)          Today's Medication Changes          These changes are accurate as of 6/18/18  1:53 PM.  If you have any questions, ask your nurse or doctor.               These medicines have changed or have updated prescriptions.        Dose/Directions    cyanocobalamin 1000 MCG tablet   Commonly known as:  vitamin  B-12   This may have changed:  when to take this   Used for:  CKD (chronic kidney disease) stage 5, GFR less than 15 ml/min (H)        Dose:  1000 mcg   Take 1 tablet by mouth daily.   Quantity:  30 tablet   Refills:  0                Primary Care Provider Office Phone # Fax #    Horacio Sheridan [phone]       [address] [place] [address] [address] [place] [address]        Equal Access to Services     LINNEA HIDALGO AH: Hadii riky lehman hadasho Soomaali, waaxda luqadaha, qaybta kaalmada adeegyada, ty agrawal . So [facility] [phone].    ATENCIÓN: Si habla español, tiene a goetz disposición servicios gratuitos de asistencia lingüística. Llame al [phone].    We comply with applicable federal civil rights laws and [place] laws. We do not discriminate on the basis of race, color, national origin, age, disability, sex, sexual orientation, or gender identity.            Thank you!     Thank you for choosing [facility]  for your care. Our goal is always to provide you with excellent care. Hearing back from our patients is one way we can continue to improve our services. Please take a few minutes to complete the written survey that you may receive in the mail after your visit with us. Thank you!             Your Updated Medication List - Protect others around you: Learn how to safely use, store and throw away your medicines at www.disposemymeds.org.          This list is accurate as of 6/18/18   1:53 PM.  Always use your most recent med list.                   Brand Name Dispense Instructions for use Diagnosis    acetylcysteine 600 MG Caps capsule    N-ACETYL CYSTEINE    30 capsule    Take 2 400 mg caps two times daily for total daily dose of 800 mg        albuterol 108 (90 Base) MCG/ACT Inhaler    PROAIR HFA/PROVENTIL HFA/VENTOLIN HFA    1 Inhaler    Inhale 2 puffs into the lungs every 6 hours as needed for shortness of breath / dyspnea or wheezing    Exercise-induced asthma       ARIPiprazole 2 MG tablet    ABILIFY    15 tablet    Take 0.5 tablets (1 mg) by mouth daily    Major depressive disorder, recurrent episode, moderate (H)       aspirin 81 MG EC tablet     30 tablet    Take 1 tablet (81 mg) by mouth daily    Kidney replaced by transplant       BENADRYL 25 MG capsule   Generic drug:  diphenhydrAMINE      Take 25 mg by mouth At Bedtime.        blood glucose monitoring lancets     1 Box    Use to test blood sugar 2 times daily or as directed.    Type 2 diabetes mellitus with peripheral neuropathy (H)       * blood glucose monitoring meter device kit    no brand specified    1 kit    Use to test blood sugar 2 times daily or as directed.    Type 2 diabetes mellitus with peripheral neuropathy (H)       * blood glucose monitoring meter device kit           blood glucose monitoring test strip    no brand specified    100 strip    Use to test blood sugars 2 times daily or as directed    Type 2 diabetes mellitus with peripheral neuropathy (H)       cyanocobalamin 1000 MCG tablet    vitamin  B-12    30 tablet    Take 1 tablet by mouth daily.    CKD (chronic kidney disease) stage 5, GFR less than 15 ml/min (H)       * cycloSPORINE modified 25 MG capsule    GENERIC EQUIVALENT    300 capsule    Take 5 capsules (125 mg) by mouth 2 times daily    Kidney replaced by transplant       * cycloSPORINE modified 25 MG capsule    GENERIC EQUIVALENT    300 capsule    TAKE 5 CAPSULES (125MG) BY MOUTH TWO TIMES A DAY     Kidney replaced by transplant       econazole nitrate 1 % cream     85 g    Apply topically daily    Type II or unspecified type diabetes mellitus with neurological manifestations, not stated as uncontrolled(250.60) (H), Dermatophytosis of foot       furosemide 20 MG tablet    LASIX    90 tablet    Take 1 tablet (20 mg) by mouth daily    Generalized edema       latanoprost 0.005 % ophthalmic solution    XALATAN    7.5 mL    PLACE 1 DROP INTO BOTH EYES AT BEDTIME    Mild stage glaucoma(365.71)       metFORMIN 500 MG 24 hr tablet    GLUCOPHAGE-XR    90 tablet    Take 3 tablets (1,500 mg) by mouth daily (with dinner)    Type 2 diabetes mellitus with diabetic polyneuropathy, without long-term current use of insulin (H)       mycophenolate 250 MG capsule    GENERIC EQUIVALENT    240 capsule    Take 4 capsules (1,000 mg) by mouth 2 times daily    Kidney transplanted       omeprazole 40 MG capsule    priLOSEC    90 capsule    Take 1 capsule (40 mg) by mouth daily    Gastroesophageal reflux disease without esophagitis       ondansetron 4 MG ODT tab    ZOFRAN-ODT    20 tablet    Take 1 tablet (4 mg) by mouth every 6 hours as needed    Chronic kidney disease, stage V (H)       order for DME     1 Units    Walker with front wheels and a seat.    Fall, initial encounter       prazosin 5 MG capsule    MINIPRESS    90 capsule    Take 3 capsules (15 mg) by mouth At Bedtime    Nightmares associated with chronic post-traumatic stress disorder       REFRESH OP      Apply to eye as needed Both eyes        replens Gel     35 g    Use vaginally as needed. Can use up to 3 times per week.    Vaginal dryness       simvastatin 20 MG tablet    ZOCOR    90 tablet    Take 1 tablet (20 mg) by mouth At Bedtime    Chronic kidney disease, stage V (H)       sulfamethoxazole-trimethoprim 400-80 MG per tablet    BACTRIM/SEPTRA    90 tablet    Take 1 tablet by mouth daily    Immunosuppression (H)       topiramate 200 MG tablet    TOPAMAX    60 tablet     Take 1 tablet (200 mg) by mouth 2 times daily    Morbid obesity due to excess calories (H)       TYLENOL 325 MG tablet   Generic drug:  acetaminophen      Take 325-650 mg by mouth as needed        vilazodone 40 MG Tabs tablet    VIIBRYD    30 tablet    Take 1 tablet (40 mg) by mouth daily    Major depressive disorder, recurrent episode, moderate (H)       vitamin D 1000 units capsule     30 capsule    2,000 Units        * Notice:  This list has 4 medication(s) that are the same as other medications prescribed for you. Read the directions carefully, and ask your doctor or other care provider to review them with you.

## 2018-06-18 NOTE — LETTER
2018         RE: Elizabeth Palma  [address]  [facility] [address]        Dear Colleague,    Thank you for referring your patient, Elizabeth Palma, to the [facility]. Please see a copy of my visit note below.    Past Medical History:   Diagnosis Date     Abnormal MRI, cervical spine 10/15/2011    2011; mild changes noted. Study done for left arm symptoms Impression:  1. Mild multilevel degenerative disc disease with no significant canal or neural stenosis seen. motion artifact on the STIR images in these are not interpretable. The remaining images were interpreted      Autosomal dominant polycystic kidney disease 2011     (Problem list name updated by automated process. Provider to review and confirm.)     CMC DJD(carpometacarpal degenerative joint disease), localized primary 3/5/2013     -donor kidney transplant 3/20/2014     Depressive disorder 11/15/2012     DM type 2 (diabetes mellitus, type 2) (H) 2013     Encounter for long-term (current) use of other medications 2015     Family history of tremor 10/17/2011     Generalized anxiety disorder 11/15/2012     Glaucoma      Hyperlipidemia 10/15/2011     Hyperparathyroidism, secondary (H) 2015     Hypertension     resolved     Immunosuppressed status (H) 3/20/2014     Major depressive disorder, recurrent episode, moderate (H) 11/15/2012     Obesity (BMI 30-39.9)      OP (osteoporosis) T score -3.8 2009 T-score -3.7      LION (obstructive sleep apnoea) 10/15/2012    intol to cpa     Pain in joint, forearm -- L unhealed Fx 2013     Premature menopause age 37 7/10/2012    OCP (vaginal bldg)-->HT which she stopped 2 mo later documented at 2007 visit (age 51).      Restless leg syndrome      Rib fractures 2013     Sensory loss 10/17/2011    Bottom of feet; uncertain if there is a neuropathy per notes.       Stiffness of joint, not elsewhere classified, hand 3/5/2013      Tremor 10/15/2011    head     Uncomplicated asthma      Patient Active Problem List   Diagnosis     Autosomal dominant polycystic kidney disease     Hypertension     Hyperlipidemia     Abnormal MRI, cervical spine     Sensory loss     Premature menopause age 37     OP (osteoporosis) T score -3.8     LION on CPAP     Major depressive disorder, recurrent episode, moderate (H)     Generalized anxiety disorder     CMC DJD(carpometacarpal degenerative joint disease), localized primary     Pain in joint, forearm -- L unhealed Fx     -donor kidney transplant     Immunosuppressed status (H)     Hyperparathyroidism, secondary (H)     Hyperparathyroidism (H)     Senile osteoporosis     Pain in joint involving ankle and foot     Nightmares associated with chronic post-traumatic stress disorder     Type 2 diabetes mellitus with peripheral neuropathy (H)     Posttraumatic stress disorder     Right knee pain     Left knee pain     Age-related osteoporosis without current pathological fracture     Narcissistic personality disorder     Past Surgical History:   Procedure Laterality Date     ABDOMEN SURGERY       ANKLE SURGERY       C TRANSPLANTATION OF KIDNEY  3/2014     C/SECTION, LOW TRANSVERSE      x 2     CHOLECYSTECTOMY       COLONOSCOPY       ESOPHAGOSCOPY, GASTROSCOPY, DUODENOSCOPY (EGD), COMBINED N/A 2015    Procedure: COMBINED ESOPHAGOSCOPY, GASTROSCOPY, DUODENOSCOPY (EGD);  Surgeon: Sky Davey MD;  Location:  GI     ESOPHAGOSCOPY, GASTROSCOPY, DUODENOSCOPY (EGD), COMBINED N/A 2015    Procedure: COMBINED ESOPHAGOSCOPY, GASTROSCOPY, DUODENOSCOPY (EGD), BIOPSY SINGLE OR MULTIPLE;  Surgeon: Sky Davey MD;  Location:  GI     EYE SURGERY       LAPAROSCOPY, SURGICAL; REPAIR INCISIONAL OR VENTRAL HERNIA       ORTHOPEDIC SURGERY       HERMINIA EN Y BOWEL       WRIST SURGERY       Social History     Social History     Marital status:      Spouse name: Rahat     Danie tovar  children: 2     Years of education: N/A     Occupational History           part-time     Social History Main Topics     Smoking status: Former Smoker     Smokeless tobacco: Never Used     Alcohol use No     Drug use: No     Sexual activity: Yes     Partners: Male     Birth control/ protection: None      Comment: 1 partner     Other Topics Concern     Not on file     Social History Narrative     Family History   Problem Relation Age of Onset     MENTAL ILLNESS Other      family hx     HEART DISEASE Other      DIABETES Other      CANCER Other      Genetic Disorder Father      Hyperlipidemia Mother      Glaucoma No family hx of      Macular Degeneration No family hx of      Hypertension No family hx of      Lab Results   Component Value Date    A1C 6.4 01/19/2018      SUBJECTIVE FINDINGS:  A 62-year-old female returns to clinic for onychauxis and diabetic foot check.  She is diabetic with peripheral neuropathy.  Relates numbness and tingling is not bad in her feet.  No ulcers or sores since I have seen her last.  She relates she is wearing her diabetic shoes.       OBJECTIVE FINDINGS:  DP and PT are 2/4 bilaterally.  CFTs are less than 3 seconds bilaterally.  She has incurvated nails with some dystrophy and subungual debris bilaterally 1-5.  She has dorsally contracted digits 1-5 bilaterally.  She has decreased ankle joint dorsiflexion bilaterally.  There is no erythema, no drainage, no odor, no calor bilaterally.  There is some dystrophy of the nails as well.         ASSESSMENT AND PLAN:  Onychauxis bilaterally.  She is diabetic with peripheral neuropathy.  Diagnosis and treatment options were discussed with the patient.  All of the nails were reduced bilaterally upon consent.  Return to clinic and see me in about 3 months.    Again, thank you for allowing me to participate in the care of your patient.        Sincerely,        Javier Tuttle DPM

## 2018-06-18 NOTE — PROGRESS NOTES
Past Medical History:   Diagnosis Date     Abnormal MRI, cervical spine 10/15/2011    2011; mild changes noted. Study done for left arm symptoms Impression:  1. Mild multilevel degenerative disc disease with no significant canal or neural stenosis seen. motion artifact on the STIR images in these are not interpretable. The remaining images were interpreted      Autosomal dominant polycystic kidney disease 2011     (Problem list name updated by automated process. Provider to review and confirm.)     CMC DJD(carpometacarpal degenerative joint disease), localized primary 3/5/2013     -donor kidney transplant 3/20/2014     Depressive disorder 11/15/2012     DM type 2 (diabetes mellitus, type 2) (H) 2013     Encounter for long-term (current) use of other medications 2015     Family history of tremor 10/17/2011     Generalized anxiety disorder 11/15/2012     Glaucoma      Hyperlipidemia 10/15/2011     Hyperparathyroidism, secondary (H) 2015     Hypertension     resolved     Immunosuppressed status (H) 3/20/2014     Major depressive disorder, recurrent episode, moderate (H) 11/15/2012     Obesity (BMI 30-39.9)      OP (osteoporosis) T score -3.8 2009 T-score -3.7      LION (obstructive sleep apnoea) 10/15/2012    intol to cpa     Pain in joint, forearm -- L unhealed Fx 2013     Premature menopause age 37 7/10/2012    OCP (vaginal bldg)-->HT which she stopped 2 mo later documented at 2007 visit (age 51).      Restless leg syndrome      Rib fractures 2013     Sensory loss 10/17/2011    Bottom of feet; uncertain if there is a neuropathy per notes.       Stiffness of joint, not elsewhere classified, hand 3/5/2013     Tremor 10/15/2011    head     Uncomplicated asthma      Patient Active Problem List   Diagnosis     Autosomal dominant polycystic kidney disease     Hypertension     Hyperlipidemia     Abnormal MRI, cervical spine     Sensory loss     Premature menopause  age 37     OP (osteoporosis) T score -3.8     LION on CPAP     Major depressive disorder, recurrent episode, moderate (H)     Generalized anxiety disorder     CMC DJD(carpometacarpal degenerative joint disease), localized primary     Pain in joint, forearm -- L unhealed Fx     -donor kidney transplant     Immunosuppressed status (H)     Hyperparathyroidism, secondary (H)     Hyperparathyroidism (H)     Senile osteoporosis     Pain in joint involving ankle and foot     Nightmares associated with chronic post-traumatic stress disorder     Type 2 diabetes mellitus with peripheral neuropathy (H)     Posttraumatic stress disorder     Right knee pain     Left knee pain     Age-related osteoporosis without current pathological fracture     Narcissistic personality disorder     Past Surgical History:   Procedure Laterality Date     ABDOMEN SURGERY       ANKLE SURGERY       C TRANSPLANTATION OF KIDNEY  3/2014     C/SECTION, LOW TRANSVERSE      x 2     CHOLECYSTECTOMY       COLONOSCOPY       ESOPHAGOSCOPY, GASTROSCOPY, DUODENOSCOPY (EGD), COMBINED N/A 2015    Procedure: COMBINED ESOPHAGOSCOPY, GASTROSCOPY, DUODENOSCOPY (EGD);  Surgeon: Sky Davey MD;  Location:  GI     ESOPHAGOSCOPY, GASTROSCOPY, DUODENOSCOPY (EGD), COMBINED N/A 2015    Procedure: COMBINED ESOPHAGOSCOPY, GASTROSCOPY, DUODENOSCOPY (EGD), BIOPSY SINGLE OR MULTIPLE;  Surgeon: Syk Davey MD;  Location:  GI     EYE SURGERY       LAPAROSCOPY, SURGICAL; REPAIR INCISIONAL OR VENTRAL HERNIA       ORTHOPEDIC SURGERY       HERMINIA EN Y BOWEL       WRIST SURGERY       Social History     Social History     Marital status:      Spouse name: Rahat     Number of children: 2     Years of education: N/A     Occupational History           part-time     Social History Main Topics     Smoking status: Former Smoker     Smokeless tobacco: Never Used     Alcohol use No     Drug use: No     Sexual activity: Yes     Partners: Male      Birth control/ protection: None      Comment: 1 partner     Other Topics Concern     Not on file     Social History Narrative     Family History   Problem Relation Age of Onset     MENTAL ILLNESS Other      family hx     HEART DISEASE Other      DIABETES Other      CANCER Other      Genetic Disorder Father      Hyperlipidemia Mother      Glaucoma No family hx of      Macular Degeneration No family hx of      Hypertension No family hx of      Lab Results   Component Value Date    A1C 6.4 01/19/2018      SUBJECTIVE FINDINGS:  A 62-year-old female returns to clinic for onychauxis and diabetic foot check.  She is diabetic with peripheral neuropathy.  Relates numbness and tingling is not bad in her feet.  No ulcers or sores since I have seen her last.  She relates she is wearing her diabetic shoes.       OBJECTIVE FINDINGS:  DP and PT are 2/4 bilaterally.  CFTs are less than 3 seconds bilaterally.  She has incurvated nails with some dystrophy and subungual debris bilaterally 1-5.  She has dorsally contracted digits 1-5 bilaterally.  She has decreased ankle joint dorsiflexion bilaterally.  There is no erythema, no drainage, no odor, no calor bilaterally.  There is some dystrophy of the nails as well.         ASSESSMENT AND PLAN:  Onychauxis bilaterally.  She is diabetic with peripheral neuropathy.  Diagnosis and treatment options were discussed with the patient.  All of the nails were reduced bilaterally upon consent.  Return to clinic and see me in about 3 months.

## 2018-06-18 NOTE — PATIENT INSTRUCTIONS
Thanks for coming today.  Ortho/Sports Medicine Clinic  [address] [place] [address]    To schedule future appointments in Ortho Clinic, you may call [phone].    To schedule ordered imaging by your Provider: Call [facility] at [phone]    [email or web address] available online at:   [email or web address]    Please call if any further questions or concerns [phone] and ask for the Orthopedic Department. Clinic hours 8 am to 5 pm.    Return to clinic if symptoms worsen.

## 2018-06-19 ENCOUNTER — ALLIED HEALTH/NURSE VISIT (OUTPATIENT)
Dept: SURGERY | Facility: CLINIC | Age: 63
End: 2018-06-19
Payer: MEDICARE

## 2018-06-19 VITALS — HEIGHT: 61 IN | WEIGHT: 152.3 LBS | BODY MASS INDEX: 28.75 KG/M2

## 2018-06-19 DIAGNOSIS — Z48.298 AFTERCARE FOLLOWING ORGAN TRANSPLANT: ICD-10-CM

## 2018-06-19 LAB
ANION GAP SERPL CALCULATED.3IONS-SCNC: 9 MMOL/L (ref 3–14)
BUN SERPL-MCNC: 14 MG/DL (ref 7–30)
CALCIUM SERPL-MCNC: 8.8 MG/DL (ref 8.5–10.1)
CHLORIDE SERPL-SCNC: 107 MMOL/L (ref 94–109)
CO2 SERPL-SCNC: 23 MMOL/L (ref 20–32)
CREAT SERPL-MCNC: 0.97 MG/DL (ref 0.52–1.04)
CYCLOSPORINE BLD LC/MS/MS-MCNC: 97 UG/L (ref 50–400)
ERYTHROCYTE [DISTWIDTH] IN BLOOD BY AUTOMATED COUNT: 14.3 % (ref 10–15)
GFR SERPL CREATININE-BSD FRML MDRD: 59 ML/MIN/1.7M2
GLUCOSE SERPL-MCNC: 130 MG/DL (ref 70–99)
HCT VFR BLD AUTO: 39.9 % (ref 35–47)
HGB BLD-MCNC: 12.7 G/DL (ref 11.7–15.7)
MCH RBC QN AUTO: 26.8 PG (ref 26.5–33)
MCHC RBC AUTO-ENTMCNC: 31.8 G/DL (ref 31.5–36.5)
MCV RBC AUTO: 84 FL (ref 78–100)
PLATELET # BLD AUTO: 197 10E9/L (ref 150–450)
POTASSIUM SERPL-SCNC: 4.1 MMOL/L (ref 3.4–5.3)
RBC # BLD AUTO: 4.74 10E12/L (ref 3.8–5.2)
SODIUM SERPL-SCNC: 139 MMOL/L (ref 133–144)
TME LAST DOSE: NORMAL H
WBC # BLD AUTO: 5 10E9/L (ref 4–11)

## 2018-06-19 NOTE — PROGRESS NOTES
"Nutrition Reassessment  Reason For Visit:  Elizabeth Palma is a 62 year-old female with type 2 DM (oral med management) presenting today for nutrition follow-up, s/p \"gastric bypass in 1990 at [facility]\" per Pt report.  She is seeing medical weight management.  She was referred by Dr. Irene.  Note: Pt had a kidney transplant on March 20, 2014.    Pt was accompanied by her .     Anthropometrics:  Height: 61\"  Pre-op Weight: 265 lbs per Pt report; lowest weight after bariatric surgery: 165-170 lbs (25 years ago)  (Pt reported that she initially was at 215 lbs in 2015 prior to seeing writer.)    Current Weight: 152.3 lbs (-3.5 lbs over the past month) with BMI of 28.84.    Current Vitamins/Minerals: 3000 International Units vitamin D/day, Calcium, vitamin C, vitamin B12 daily, B-complex  *Pt stated that she was told not to take other vitamins/minerals by MD.    Nutrition History:  Lactose intolerance ever since bariatric surgery.  Pt stated that she has osteoporosis; however, she stated that her doctors don't want her to take any vitamins or minerals due to her kidney transplant.    Diet/Nutrition Intake Hx: Pt reports her intake varies due to fluctuating appetite. Per pt there are days when she eats 3 meals but on other days she may not eat much at all or nibble on something through out the day. Pt also states her intake is affected by nausea 2/2 her anti-rejection medications. However pt reports she tries to make healthy choices (lower in calorie). Pt is also limited in protein choices that she likes - pt reports she does not take much dairy, does not like beans and lentils, keeps kosher. Advised pt to try to time her meals and eat every 4 hours instead of grazing but pt refused stating she would rather not eat at all.     *Pt stated that she will not record food intake due to the fact that every time she does this, she simply does not eat as she doesn't want to record.  She stated that her therapist " strongly advises against recording food intake for this reason.    Physical Activity Note: Pt reports she has a tendency to break bones so she is limited with what exercises she does. Pt states there is a long hallway in the building that she needs to walk when going out. Pt states she does not walk for exercises but walks only to get ADLs done.      Progress with Previous Goals:    1. Avoid grazing through, Eat 3 meals with lean protein at each meal, along with a non-starchy vegetable or whole fruit, up to 1/2 c carb at a meal. - Pt was off her routine with son and grandson.     -Try hard-boiled eggs to put on your salad or to have later in the day to make up for skipping breakfast.    2. If needing a snack, have vegetables (give at least 2 hours between a meals and a snack). - Pt has been off her routine for food due to her grandson visiting and being a picky eater.  ([employer], chicken nuggets, chips, fish sandwich out to eat with bun, pasta, etc).   3. Walk 5-10 min daily, increasing as able. - Pt went 3 miles at the [facility] with grandson on the weekend.  She has also been going to isaac/playgrounds with her grandson in the [facility] while in town.    Nutrition Prescription:  Grams Protein: 60 (minimum)  Amount of Fluid: 48-64 oz    Nutrition Diagnosis  Previous: Overweight/Obesity related to excessive energy intake as evidenced by BMI > 25.  -Obesity resolved.  Updated: Overweight related to history of excessive energy intake as evidenced by BMI > 25.     Intervention  Intervention At Appointment:  Materials/Education provided:  Discussed Pt's frustration with not losing more weight and circumstances surrounding it.  Discussed staying on-track and maintaining/losing weight with added challenges of visitors and traveling plans.  Reviewed previous goals, adjusting slightly the exercise goal now that she is getting around better.  Gave encouragement and support.    Patient Understanding: good  Expected Compliance: good  with continued RD follow up.    Goals:   1. Avoid grazing through, Eat 3 meals with lean protein at each meal, along with a non-starchy vegetable or whole fruit, up to 1/2 c carb at a meal.   -Try hard-boiled eggs to put on your salad or to have later in the day to make up for skipping breakfast.    2. If needing a snack, have vegetables (give at least 2 hours between a meals and a snack).  3. Walk 10 min daily, increasing as able.    Follow-Up: 1 month    Time spent with patient: 15 minutes.  Francesca Danielle, MS, RDN, LDN, CLT  Pager: [phone]

## 2018-06-19 NOTE — PATIENT INSTRUCTIONS
Goals:   1. Avoid grazing through, Eat 3 meals with lean protein at each meal, along with a non-starchy vegetable or whole fruit, up to 1/2 c carb at a meal.   -Try hard-boiled eggs to put on your salad or to have later in the day to make up for skipping breakfast.    2. If needing a snack, have vegetables (give at least 2 hours between a meals and a snack).  3. Walk 10 min daily, increasing as able.

## 2018-06-19 NOTE — MR AVS SNAPSHOT
MRN:[id number]                      After Visit Summary   6/19/2018    Elizabeth Palma    MRN: [id number]           Visit Information        Provider Department      6/19/2018 9:30 AM Francesca Danielle RD [facility] Surgical Weight Management        Your next 10 appointments already scheduled     Jul 02, 2018 10:45 AM CDT   (Arrive by 10:30 AM)   Return Visit with Prakash Irene MD   [facility] Medical Weight Management ([facility])    [address] [place] [id number]   [phone]            Jul 10, 2018  1:00 PM CDT   Adult Med Follow UP with Chaparrita Hatch MD   [facility] Psychiatry ([facility])    [address]  [place] [id number] [phone]            Jul 31, 2018  9:00 AM CDT   LAB with  LAB   [facility] Lab ([facility])    [address] [place] [id number] [phone]           Please do not eat 10-12 hours before your appointment if you are coming in fasting for labs on lipids, cholesterol, or glucose (sugar). This does not apply to pregnant women. Water, hot tea and black coffee (with nothing added) are okay. Do not drink other fluids, diet soda or chew gum.            Jul 31, 2018  9:30 AM CDT   (Arrive by 9:15 AM)   NUTRITION VISIT with SUJATA Stapleton [facility] Surgical Weight Management ([facility])    [address] [place] [id number]   [phone]            Nov 05, 2018 12:00 PM CST   DX HIP/PELVIS/SPINE with DX1   [facility] ([facility])    [address] [place] [id number] [phone]           Please do not take any of the following 24 hours prior to the day of your exam: vitamins, calcium tablets, antacids.  If possible, please wear clothes without metal (snaps, zippers). A sweatsuit  works well.            Nov 05, 2018  1:00 PM CST   (Arrive by 12:45 PM)   RETURN ENDOCRINE with Lizbet Byers MD   [facility] Endocrinology ([facility])    [address]  3[address]  [place] [address] [phone]            Mar 18, 2019  1:30 PM CDT   (Arrive by 1:00 PM)   Return Kidney Transplant with Uc Kidney/Pancreas Recipient   [facility] Nephrology ([facility])    [address]  [facility] [address] [phone]              Care Instructions    Goals:   1. Avoid grazing through, Eat 3 meals with lean protein at each meal, along with a non-starchy vegetable or whole fruit, up to 1/2 c carb at a meal.   -Try hard-boiled eggs to put on your salad or to have later in the day to make up for skipping breakfast.    2. If needing a snack, have vegetables (give at least 2 hours between a meals and a snack).  3. Walk 10 min daily, increasing as able.         payByMobile Information     [employer] gives you secure access to your electronic health record. If you see a primary care provider, you can also send messages to your care team and make appointments. If you have questions, please call your primary care clinic.  If you do not have a primary care provider, please call [phone] and they will assist you.      [employer] is an electronic gateway that provides easy, online access to your medical records. With [employer], you can request a clinic appointment, read your test results, renew a prescription or communicate with your care team.     To access your existing account, please contact your [facility] or call [phone] for assistance.        Care EveryWhere ID     This is your Care EveryWhere ID. This could be used by other organizations to access your [place] medical records  [id number]        Equal Access to Services     LINNEA HIDALGO : radha Cabrera,  ty perla ah. So [facility] [phone].    ATENCIÓN: Si habla español, tiene a goetz disposición servicios gratuitos de asistencia lingüística. Llame al [phone].    We comply with applicable federal civil rights laws and [place] laws. We do not discriminate on the basis of race, color, national origin, age, disability, sex, sexual orientation, or gender identity.

## 2018-07-02 ENCOUNTER — OFFICE VISIT (OUTPATIENT)
Dept: ENDOCRINOLOGY | Facility: CLINIC | Age: 63
End: 2018-07-02
Payer: MEDICARE

## 2018-07-02 VITALS
DIASTOLIC BLOOD PRESSURE: 56 MMHG | BODY MASS INDEX: 28.74 KG/M2 | OXYGEN SATURATION: 99 % | SYSTOLIC BLOOD PRESSURE: 122 MMHG | HEART RATE: 89 BPM | HEIGHT: 61 IN | WEIGHT: 152.2 LBS

## 2018-07-02 DIAGNOSIS — E66.01 MORBID OBESITY DUE TO EXCESS CALORIES (H): ICD-10-CM

## 2018-07-02 RX ORDER — TOPIRAMATE 200 MG/1
200 TABLET, FILM COATED ORAL 2 TIMES DAILY
Qty: 60 TABLET | Refills: 5 | Status: SHIPPED | OUTPATIENT
Start: 2018-07-02 | End: 2018-11-12

## 2018-07-02 ASSESSMENT — ENCOUNTER SYMPTOMS
DECREASED CONCENTRATION: 0
NERVOUS/ANXIOUS: 1
PANIC: 1
INSOMNIA: 1
DEPRESSION: 1

## 2018-07-02 NOTE — NURSING NOTE
"  Chief Complaint   Patient presents with     Weight Problem     RMWM     Vitals:    07/02/18 1039   BP: 122/56   Pulse: 89   SpO2: 99%   Weight: 152 lb 3.2 oz   Height: 5' 1\"     Body mass index is 28.76 kg/(m^2).  Sally Smiley CMA    "

## 2018-07-02 NOTE — LETTER
2018       RE: Elizabeth Palma  [address]  [place] [address]     Dear Colleague,    Thank you for referring your patient, Elizabeth Palma, to the [facility] MEDICAL WEIGHT MANAGEMENT at [facility]. Please see a copy of my visit note below.        Return Medical Weight Management Note     Elizabeth Palma  MRN:  [id number]  :  1955  AYSE:  2018    Dear Dr. Sheridan,      I had the pleasure of seeing your patient Elizabeth Palma.  She is a 60 year old female who I am continuing to see for treatment of obesity related to: DM-2, Hyperlipidemia, Sleep Apnea (has CPAP and does not use), GERD and Depression.    CURRENT WEIGHT:   152 lbs 3.2 oz    Wt Readings from Last 4 Encounters:   18 69 kg (152 lb 3.2 oz)   18 69.1 kg (152 lb 4.8 oz)   18 70.5 kg (155 lb 6.4 oz)   18 70.7 kg (155 lb 12.8 oz)     Body Mass Index:  Body mass index is 28.76 kg/(m^2).  Vitals:  B/P: 119/79, P: 60    Initial consult weight was 214 on 2015.  Weight change since last seen on 2018 is down 7 pounds.   Total loss is 62 pounds.    INTERVAL HISTORY:  Topiramate 200 AM and 200 HS along with off gabapentin, and pain is ok. Fewer problems with evening and night eating.     Diet and Activity Changes Since Last Visit Reviewed With Patient 2018   I have made the following changes to my diet since my last visit: smaller portiortions  more protien   With regards to my diet, I am still struggling with: protien   For breakfast, I typically eat: -   For lunch, I typically eat: -   For supper, I typically eat: -   For snack(s), I typically eat: -   I have made the following changes to my activity/exercise since my last visit: more walking   With regards to my activity/exercise, I am still struggling with: -     MEDICATIONS:   Current Outpatient Prescriptions   Medication     acetaminophen (TYLENOL) 325 MG tablet     acetylcysteine (N-ACETYL-L-CYSTEINE)  600 MG CAPS capsule     albuterol (PROAIR HFA/PROVENTIL HFA/VENTOLIN HFA) 108 (90 BASE) MCG/ACT Inhaler     ARIPiprazole (ABILIFY) 2 MG tablet     aspirin EC 81 MG EC tablet     blood glucose monitoring (ACCU-CHEK RALPH PLUS) meter device kit     blood glucose monitoring (NO BRAND SPECIFIED) meter device kit     blood glucose monitoring (NO BRAND SPECIFIED) test strip     blood glucose monitoring (SOFTCLIX) lancets     Cholecalciferol (VITAMIN D) 1000 UNITS capsule     cyanocolbalamin (VITAMIN  B-12) 1000 MCG tablet     cycloSPORINE modified (GENERIC EQUIVALENT) 25 MG capsule     cycloSPORINE modified (GENERIC EQUIVALENT) 25 MG capsule     diphenhydrAMINE (BENADRYL) 25 MG capsule     econazole nitrate 1 % cream     furosemide (LASIX) 20 MG tablet     latanoprost (XALATAN) 0.005 % ophthalmic solution     metFORMIN (GLUCOPHAGE-XR) 500 MG 24 hr tablet     mycophenolate (GENERIC EQUIVALENT) 250 MG capsule     omeprazole (PRILOSEC) 40 MG capsule     ondansetron (ZOFRAN-ODT) 4 MG ODT tab     order for DME     Polyvinyl Alcohol-Povidone (REFRESH OP)     prazosin (MINIPRESS) 5 MG capsule     simvastatin (ZOCOR) 20 MG tablet     sulfamethoxazole-trimethoprim (BACTRIM/SEPTRA) 400-80 MG per tablet     topiramate (TOPAMAX) 200 MG tablet     Vaginal Lubricant (REPLENS) GEL     vilazodone (VIIBRYD) 40 MG TABS tablet     No current facility-administered medications for this visit.        Weight Loss Medication History Reviewed With Patient 7/2/2018   Which weight loss medications are you currently taking on a regular basis?  Topamax (topiramate)   Are you having any side effects from the weight loss medication that we have prescribed you? No   If you are having side effects please describe: -     ASSESSMENT:   Good progress again. Maintain topiramate 200 morning and evening. Remains off gabapentin with acceptable neuropathy pain control.    FOLLOW-UP:    6 months.  10/15 minutes spent on counseling and  education    Sincerely,    Prakash Irene MD

## 2018-07-02 NOTE — PROGRESS NOTES
Return Medical Weight Management Note     Elizabeth Palma  MRN:  [id number]  :  1955  AYSE:  2018    Dear Dr. Sheridan,      I had the pleasure of seeing your patient Elizabeth Palma.  She is a 60 year old female who I am continuing to see for treatment of obesity related to: DM-2, Hyperlipidemia, Sleep Apnea (has CPAP and does not use), GERD and Depression.    CURRENT WEIGHT:   152 lbs 3.2 oz    Wt Readings from Last 4 Encounters:   18 69 kg (152 lb 3.2 oz)   18 69.1 kg (152 lb 4.8 oz)   18 70.5 kg (155 lb 6.4 oz)   18 70.7 kg (155 lb 12.8 oz)     Body Mass Index:  Body mass index is 28.76 kg/(m^2).  Vitals:  B/P: 119/79, P: 60    Initial consult weight was 214 on 2015.  Weight change since last seen on 2018 is down 7 pounds.   Total loss is 62 pounds.    INTERVAL HISTORY:  Topiramate 200 AM and 200 HS along with off gabapentin, and pain is ok. Fewer problems with evening and night eating.     Diet and Activity Changes Since Last Visit Reviewed With Patient 2018   I have made the following changes to my diet since my last visit: smaller portiortions  more protien   With regards to my diet, I am still struggling with: protien   For breakfast, I typically eat: -   For lunch, I typically eat: -   For supper, I typically eat: -   For snack(s), I typically eat: -   I have made the following changes to my activity/exercise since my last visit: more walking   With regards to my activity/exercise, I am still struggling with: -     MEDICATIONS:   Current Outpatient Prescriptions   Medication     acetaminophen (TYLENOL) 325 MG tablet     acetylcysteine (N-ACETYL-L-CYSTEINE) 600 MG CAPS capsule     albuterol (PROAIR HFA/PROVENTIL HFA/VENTOLIN HFA) 108 (90 BASE) MCG/ACT Inhaler     ARIPiprazole (ABILIFY) 2 MG tablet     aspirin EC 81 MG EC tablet     blood glucose monitoring (ACCU-CHEK RALPH PLUS) meter device kit     blood glucose monitoring (NO BRAND SPECIFIED) meter device  kit     blood glucose monitoring (NO BRAND SPECIFIED) test strip     blood glucose monitoring (SOFTCLIX) lancets     Cholecalciferol (VITAMIN D) 1000 UNITS capsule     cyanocolbalamin (VITAMIN  B-12) 1000 MCG tablet     cycloSPORINE modified (GENERIC EQUIVALENT) 25 MG capsule     cycloSPORINE modified (GENERIC EQUIVALENT) 25 MG capsule     diphenhydrAMINE (BENADRYL) 25 MG capsule     econazole nitrate 1 % cream     furosemide (LASIX) 20 MG tablet     latanoprost (XALATAN) 0.005 % ophthalmic solution     metFORMIN (GLUCOPHAGE-XR) 500 MG 24 hr tablet     mycophenolate (GENERIC EQUIVALENT) 250 MG capsule     omeprazole (PRILOSEC) 40 MG capsule     ondansetron (ZOFRAN-ODT) 4 MG ODT tab     order for DME     Polyvinyl Alcohol-Povidone (REFRESH OP)     prazosin (MINIPRESS) 5 MG capsule     simvastatin (ZOCOR) 20 MG tablet     sulfamethoxazole-trimethoprim (BACTRIM/SEPTRA) 400-80 MG per tablet     topiramate (TOPAMAX) 200 MG tablet     Vaginal Lubricant (REPLENS) GEL     vilazodone (VIIBRYD) 40 MG TABS tablet     No current facility-administered medications for this visit.        Weight Loss Medication History Reviewed With Patient 7/2/2018   Which weight loss medications are you currently taking on a regular basis?  Topamax (topiramate)   Are you having any side effects from the weight loss medication that we have prescribed you? No   If you are having side effects please describe: -     ASSESSMENT:   Good progress again. Maintain topiramate 200 morning and evening. Remains off gabapentin with acceptable neuropathy pain control.    FOLLOW-UP:    6 months.  10/15 minutes spent on counseling and education    Sincerely,    Prakash Irene MD

## 2018-07-02 NOTE — MR AVS SNAPSHOT
After Visit Summary   7/2/2018    Elizabeth Palma    MRN: [id number]           Patient Information     Date Of Birth          1955        Visit Information        Provider Department      7/2/2018 10:45 AM Prakash Irene MD [facility]        Today's Diagnoses     Morbid obesity due to excess calories (H)           Follow-ups after your visit        Follow-up notes from your care team     Return in about 4 months (around 11/2/2018).      Your next 10 appointments already scheduled     Jul 10, 2018  1:00 PM CDT   Adult Med Follow UP with Chaparrita Hatch MD   [facility] ([facility])    [address]  [facility] [address]   [phone]            Jul 31, 2018  9:00 AM CDT   LAB with  LAB   [facility] ([facility])    [address] [place] [id number] [phone]           Please do not eat 10-12 hours before your appointment if you are coming in fasting for labs on lipids, cholesterol, or glucose (sugar). This does not apply to pregnant women. Water, hot tea and black coffee (with nothing added) are okay. Do not drink other fluids, diet soda or chew gum.            Jul 31, 2018  9:30 AM CDT   (Arrive by 9:15 AM)   NUTRITION VISIT with SUJATA Stapleton [facility] ([facility])    [address] [place] [id number] [phone]            Aug 28, 2018  9:00 AM CDT   Lab with  LAB   [facility] ([facility])    [address] [place] [address] [id number] [phone]            Aug 28, 2018  9:30 AM CDT   (Arrive by 9:15 AM)   NUTRITION VISIT with SUJATA Stapleton [facility] ([facility])    [address] [place] [id number] [phone]            Sep  25, 2018  9:00 AM CDT   Lab with  LAB   [facility] ([facility])    [address]  1st Floor  [facility] [id number] [phone]            Sep 25, 2018  9:30 AM CDT   (Arrive by 9:15 AM)   NUTRITION VISIT with Francesca Danielle RD   [facility] ([facility])    [address]  4th Floor  [facility] [id number] [phone]            Nov 05, 2018 12:00 PM CST   DX HIP/PELVIS/SPINE with UCDX1   [facility] [place] [facility] ([facility])    [address]  1st [facility] [id number] [phone]           Please do not take any of the following 24 hours prior to the day of your exam: vitamins, calcium tablets, antacids.  If possible, please wear clothes without metal (snaps, zippers). A sweatsuit works well.            Nov 05, 2018  1:00 PM CST   (Arrive by 12:45 PM)   RETURN ENDOCRINE with Lizbet Byers MD   [facility] Endocrinology ([facility])    [address]  3rd Floor  [facility] [id number] [phone]            Mar 18, 2019  1:30 PM CDT   (Arrive by 1:00 PM)   Return Kidney Transplant with  Kidney/Pancreas Recipient   [facility] Nephrology ([facility])    [address] [place] [address]  [address]  [facility] [id number] [phone]              Who to contact     Please call your clinic at [phone] to:    Ask questions about your health    Make or cancel appointments    Discuss your medicines    Learn about your test results    Speak to your doctor            Additional Information About Your Visit        MyChart Information     [employer] gives you secure access to your electronic health record. If you see a primary care provider, you can also send messages to your care team and make appointments. If you have questions, please call your primary care clinic.  If you do not have a primary  "care provider, please call [phone] and they will assist you.      [employer] is an electronic gateway that provides easy, online access to your medical records. With [employer], you can request a clinic appointment, read your test results, renew a prescription or communicate with your care team.     To access your existing account, please contact your [facility] or call [phone] for assistance.        Care EveryWhere ID     This is your Care EveryWhere ID. This could be used by other organizations to access your [facility] medical records  [id number]        Your Vitals Were     Pulse Height Pulse Oximetry BMI (Body Mass Index)          89 1.549 m (5' 1\") 99% 28.76 kg/m2         Blood Pressure from Last 3 Encounters:   07/02/18 122/56   06/18/18 124/80   06/05/18 127/75    Weight from Last 3 Encounters:   07/02/18 69 kg (152 lb 3.2 oz)   06/19/18 69.1 kg (152 lb 4.8 oz)   06/05/18 70.5 kg (155 lb 6.4 oz)              Today, you had the following     No orders found for display         Today's Medication Changes          These changes are accurate as of 7/2/18 11:05 AM.  If you have any questions, ask your nurse or doctor.               These medicines have changed or have updated prescriptions.        Dose/Directions    cyanocobalamin 1000 MCG tablet   Commonly known as:  vitamin  B-12   This may have changed:  when to take this   Used for:  CKD (chronic kidney disease) stage 5, GFR less than 15 ml/min (H)        Dose:  1000 mcg   Take 1 tablet by mouth daily.   Quantity:  30 tablet   Refills:  0            Where to get your medicines      These medications were sent to [id number] - [place], [place] - [address] [address]     Phone:  [phone]     topiramate 200 MG tablet                Primary Care Provider Office Phone # Fax #    Horacio Sheridan [phone]       [address] [place] [address] [address] [place] [address] " [phone]        Equal Access to Services     [facility]: Hadii riky lehman amichhaya Emy, wareedda luqadaha, qaybta kalluviajuly horowitz, ty fam. So [facility] [phone].    ATENCIÓN: Si habla español, tiene a goetz disposición servicios gratuitos de asistencia lingüística. Llame al [phone].    We comply with applicable federal civil rights laws and [place] laws. We do not discriminate on the basis of race, color, national origin, age, disability, sex, sexual orientation, or gender identity.            Thank you!     Thank you for choosing [facility] MANAGEMENT  for your care. Our goal is always to provide you with excellent care. Hearing back from our patients is one way we can continue to improve our services. Please take a few minutes to complete the written survey that you may receive in the mail after your visit with us. Thank you!             Your Updated Medication List - Protect others around you: Learn how to safely use, store and throw away your medicines at www.disposemymeds.org.          This list is accurate as of 7/2/18 11:05 AM.  Always use your most recent med list.                   Brand Name Dispense Instructions for use Diagnosis    acetylcysteine 600 MG Caps capsule    N-ACETYL CYSTEINE    30 capsule    Take 2 400 mg caps two times daily for total daily dose of 800 mg        albuterol 108 (90 Base) MCG/ACT Inhaler    PROAIR HFA/PROVENTIL HFA/VENTOLIN HFA    1 Inhaler    Inhale 2 puffs into the lungs every 6 hours as needed for shortness of breath / dyspnea or wheezing    Exercise-induced asthma       ARIPiprazole 2 MG tablet    ABILIFY    15 tablet    Take 0.5 tablets (1 mg) by mouth daily    Major depressive disorder, recurrent episode, moderate (H)       aspirin 81 MG EC tablet     30 tablet    Take 1 tablet (81 mg) by mouth daily    Kidney replaced by transplant       BENADRYL 25 MG capsule   Generic drug:  diphenhydrAMINE      Take 25 mg by mouth At  Bedtime.        blood glucose monitoring lancets     1 Box    Use to test blood sugar 2 times daily or as directed.    Type 2 diabetes mellitus with peripheral neuropathy (H)       * blood glucose monitoring meter device kit    no brand specified    1 kit    Use to test blood sugar 2 times daily or as directed.    Type 2 diabetes mellitus with peripheral neuropathy (H)       * blood glucose monitoring meter device kit           blood glucose monitoring test strip    no brand specified    100 strip    Use to test blood sugars 2 times daily or as directed    Type 2 diabetes mellitus with peripheral neuropathy (H)       cyanocobalamin 1000 MCG tablet    vitamin  B-12    30 tablet    Take 1 tablet by mouth daily.    CKD (chronic kidney disease) stage 5, GFR less than 15 ml/min (H)       * cycloSPORINE modified 25 MG capsule    GENERIC EQUIVALENT    300 capsule    Take 5 capsules (125 mg) by mouth 2 times daily    Kidney replaced by transplant       * cycloSPORINE modified 25 MG capsule    GENERIC EQUIVALENT    300 capsule    TAKE 5 CAPSULES (125MG) BY MOUTH TWO TIMES A DAY    Kidney replaced by transplant       econazole nitrate 1 % cream     85 g    Apply topically daily    Type II or unspecified type diabetes mellitus with neurological manifestations, not stated as uncontrolled(250.60) (H), Dermatophytosis of foot       furosemide 20 MG tablet    LASIX    90 tablet    Take 1 tablet (20 mg) by mouth daily    Generalized edema       latanoprost 0.005 % ophthalmic solution    XALATAN    7.5 mL    PLACE 1 DROP INTO BOTH EYES AT BEDTIME    Mild stage glaucoma(365.71)       metFORMIN 500 MG 24 hr tablet    GLUCOPHAGE-XR    90 tablet    Take 3 tablets (1,500 mg) by mouth daily (with dinner)    Type 2 diabetes mellitus with diabetic polyneuropathy, without long-term current use of insulin (H)       mycophenolate 250 MG capsule    GENERIC EQUIVALENT    240 capsule    Take 4 capsules (1,000 mg) by mouth 2 times daily    Kidney  transplanted       omeprazole 40 MG capsule    priLOSEC    90 capsule    Take 1 capsule (40 mg) by mouth daily    Gastroesophageal reflux disease without esophagitis       ondansetron 4 MG ODT tab    ZOFRAN-ODT    20 tablet    Take 1 tablet (4 mg) by mouth every 6 hours as needed    Chronic kidney disease, stage V (H)       order for DME     1 Units    Walker with front wheels and a seat.    Fall, initial encounter       prazosin 5 MG capsule    MINIPRESS    90 capsule    Take 3 capsules (15 mg) by mouth At Bedtime    Nightmares associated with chronic post-traumatic stress disorder       REFRESH OP      Apply to eye as needed Both eyes        replens Gel     35 g    Use vaginally as needed. Can use up to 3 times per week.    Vaginal dryness       simvastatin 20 MG tablet    ZOCOR    90 tablet    Take 1 tablet (20 mg) by mouth At Bedtime    Chronic kidney disease, stage V (H)       sulfamethoxazole-trimethoprim 400-80 MG per tablet    BACTRIM/SEPTRA    90 tablet    Take 1 tablet by mouth daily    Immunosuppression (H)       topiramate 200 MG tablet    TOPAMAX    60 tablet    Take 1 tablet (200 mg) by mouth 2 times daily    Morbid obesity due to excess calories (H)       TYLENOL 325 MG tablet   Generic drug:  acetaminophen      Take 325-650 mg by mouth as needed        vilazodone 40 MG Tabs tablet    VIIBRYD    30 tablet    Take 1 tablet (40 mg) by mouth daily    Major depressive disorder, recurrent episode, moderate (H)       vitamin D 1000 units capsule     30 capsule    2,000 Units        * Notice:  This list has 4 medication(s) that are the same as other medications prescribed for you. Read the directions carefully, and ask your doctor or other care provider to review them with you.

## 2018-07-06 DIAGNOSIS — F33.1 MAJOR DEPRESSIVE DISORDER, RECURRENT EPISODE, MODERATE (H): ICD-10-CM

## 2018-07-06 RX ORDER — VILAZODONE HYDROCHLORIDE 40 MG/1
40 TABLET ORAL DAILY
Qty: 30 TABLET | Refills: 0 | Status: SHIPPED | OUTPATIENT
Start: 2018-07-06 | End: 2018-07-10

## 2018-07-06 NOTE — TELEPHONE ENCOUNTER
Last seen: 6/5/18  RTC: 4 weeks   Cancel: none  No-show: none  Next appt: 7/10/18    Incoming refill from [employer] via Fax    Medication requested: Viibryd 40 mg   Directions: Take 1 tablet by mouth daily   Qty: 30      Medication refill approved per refill protocol

## 2018-07-10 ENCOUNTER — OFFICE VISIT (OUTPATIENT)
Dept: PSYCHIATRY | Facility: CLINIC | Age: 63
End: 2018-07-10
Payer: MEDICARE

## 2018-07-10 VITALS
BODY MASS INDEX: 28.95 KG/M2 | HEART RATE: 55 BPM | RESPIRATION RATE: 16 BRPM | TEMPERATURE: 97.2 F | DIASTOLIC BLOOD PRESSURE: 78 MMHG | WEIGHT: 153.2 LBS | SYSTOLIC BLOOD PRESSURE: 124 MMHG

## 2018-07-10 DIAGNOSIS — F51.5 NIGHTMARES ASSOCIATED WITH CHRONIC POST-TRAUMATIC STRESS DISORDER: ICD-10-CM

## 2018-07-10 DIAGNOSIS — F33.1 MAJOR DEPRESSIVE DISORDER, RECURRENT EPISODE, MODERATE (H): ICD-10-CM

## 2018-07-10 DIAGNOSIS — F43.12 NIGHTMARES ASSOCIATED WITH CHRONIC POST-TRAUMATIC STRESS DISORDER: ICD-10-CM

## 2018-07-10 RX ORDER — PRAZOSIN HYDROCHLORIDE 5 MG/1
15 CAPSULE ORAL AT BEDTIME
Qty: 90 CAPSULE | Refills: 3 | Status: SHIPPED | OUTPATIENT
Start: 2018-07-10 | End: 2018-11-06

## 2018-07-10 RX ORDER — ARIPIPRAZOLE 2 MG/1
1 TABLET ORAL DAILY
Qty: 15 TABLET | Refills: 3 | Status: SHIPPED | OUTPATIENT
Start: 2018-07-10 | End: 2018-09-18

## 2018-07-10 RX ORDER — VILAZODONE HYDROCHLORIDE 40 MG/1
40 TABLET ORAL DAILY
Qty: 30 TABLET | Refills: 3 | Status: SHIPPED | OUTPATIENT
Start: 2018-07-10 | End: 2018-11-06

## 2018-07-10 ASSESSMENT — PAIN SCALES - GENERAL: PAINLEVEL: MODERATE PAIN (4)
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
## 2018-07-10 NOTE — MR AVS SNAPSHOT
After Visit Summary   7/10/2018    Elizabeth Palma    MRN: [id number]           Patient Information     Date Of Birth          1955        Visit Information        Provider Department      7/10/2018 1:00 PM Chaparrita Hatch MD [facility] Psychiatry        Today's Diagnoses     Nightmares associated with chronic post-traumatic stress disorder        Major depressive disorder, recurrent episode, moderate (H)           Follow-ups after your visit        Follow-up notes from your care team     Return in about 4 weeks (around 8/7/2018) for 30 min. MFU.      Your next 10 appointments already scheduled     Jul 31, 2018  9:00 AM CDT   LAB with  LAB   [facility] Lab ([facility])    [address]  1st [facility] [id number] [phone]           Please do not eat 10-12 hours before your appointment if you are coming in fasting for labs on lipids, cholesterol, or glucose (sugar). This does not apply to pregnant women. Water, hot tea and black coffee (with nothing added) are okay. Do not drink other fluids, diet soda or chew gum.            Jul 31, 2018  9:30 AM CDT   (Arrive by 9:15 AM)   NUTRITION VISIT with Francesca Danielle RD   [facility] Surgical Weight Management ([facility])    [address]  4th [facility] [id number] [phone]            Aug 21, 2018  1:00 PM CDT   Adult Med Follow UP with Chaparrita Hatch MD   [facility] Psychiatry ([facility])    [address] [address]   [phone]            Aug 28, 2018  9:00 AM CDT   Lab with  LAB    [facility] ([facility])    [address]  1st [facility] [id number] [phone]            Aug 28, 2018  9:30 AM CDT   (Arrive by 9:15 AM)   NUTRITION VISIT with Francesca Danielle RD   [facility] Surgical Weight Management ([facility])    [address] [address]   [phone]            Sep 25, 2018  9:00 AM CDT   Lab with [facility]   [facility] Lab ([facility])    [address] [place] [address]   [phone]            Sep 25, 2018  9:30 AM CDT   (Arrive by 9:15 AM)   NUTRITION VISIT with Francesca Danielle RD   [facility] Surgical Weight Management ([facility])    [address] [place] [address]   [phone]            Nov 05, 2018 12:00 PM CST   DX HIP/PELVIS/SPINE with DX1   [facility] ([facility])    [address] [place] [address] [phone]           Please do not take any of the following 24 hours prior to the day of your exam: vitamins, calcium tablets, antacids.  If possible, please wear clothes without metal (snaps, zippers). A sweatsuit works well.            Nov 05, 2018  1:00 PM CST   (Arrive by 12:45 PM)   RETURN ENDOCRINE with Lizbet Byers MD   [facility] Endocrinology ([facility])    [address] [place] [address] [phone]            Nov 12, 2018 10:30 AM CST   (Arrive by 10:15 AM)   Return Visit with Prakash Irene MD   [facility] Medical Weight Management ([facility])    [address] [place] [id number] [phone]              Who to contact     Please call your clinic at [phone] to:    Ask questions about your health    Make or cancel appointments    Discuss your medicines    Learn about your test results    Speak to your doctor            Additional Information About Your Visit        MyChart Information     MyChart gives you secure access to your electronic health record. If you see a primary care provider, you can also send messages to your care team and make appointments. If you  have questions, please call your primary care clinic.  If you do not have a primary care provider, please call [phone] and they will assist you.      [employer] is an electronic gateway that provides easy, online access to your medical records. With [employer], you can request a clinic appointment, read your test results, renew a prescription or communicate with your care team.     To access your existing account, please contact your [facility] or call [phone] for assistance.        Care EveryWhere ID     This is your Care EveryWhere ID. This could be used by other organizations to access your [facility] medical records  [id number]        Your Vitals Were     Pulse Temperature Respirations BMI (Body Mass Index)          55 97.2  F (36.2  C) 16 28.95 kg/m2         Blood Pressure from Last 3 Encounters:   07/10/18 124/78   07/02/18 122/56   06/18/18 124/80    Weight from Last 3 Encounters:   07/10/18 69.5 kg (153 lb 3.2 oz)   07/02/18 69 kg (152 lb 3.2 oz)   06/19/18 69.1 kg (152 lb 4.8 oz)              Today, you had the following     No orders found for display         Today's Medication Changes          These changes are accurate as of 7/10/18  1:35 PM.  If you have any questions, ask your nurse or doctor.               These medicines have changed or have updated prescriptions.        Dose/Directions    cyanocobalamin 1000 MCG tablet   Commonly known as:  vitamin  B-12   This may have changed:  when to take this   Used for:  CKD (chronic kidney disease) stage 5, GFR less than 15 ml/min (H)        Dose:  1000 mcg   Take 1 tablet by mouth daily.   Quantity:  30 tablet   Refills:  0            Where to get your medicines      These medications were sent to [facility][id number] [facility] [address] [address]     Phone:  [phone]     ARIPiprazole 2 MG tablet         These medications were sent to [facility]  [address] - [place], [place] - [address] AT [facility] [address]  [address], \[facility]\"" [address]     Phone:  [phone]     prazosin 5 MG capsule    vilazodone 40 MG Tabs tablet                Primary Care Provider Office Phone # Fax #    Horacio Sheridan [phone]       [address] [place] [address] [address]  [facility] [address]        Equal Access to Services     LINNEA HIDALGO : Hadii aad ku hadasho Soomaali, waaxda luqadaha, qaybta kaalmada adeegyada, waxay idiin hayaan adeeg kharash la'aan ah. So [facility] [phone].    ATENCIÓN: Si habla español, tiene a goetz disposición servicios gratuitos de asistencia lingüística. [facility] [phone].    We comply with applicable federal civil rights laws and [place] laws. We do not discriminate on the basis of race, color, national origin, age, disability, sex, sexual orientation, or gender identity.            Thank you!     Thank you for choosing [facility]  for your care. Our goal is always to provide you with excellent care. Hearing back from our patients is one way we can continue to improve our services. Please take a few minutes to complete the written survey that you may receive in the mail after your visit with us. Thank you!             Your Updated Medication List - Protect others around you: Learn how to safely use, store and throw away your medicines at www.disposemymeds.org.          This list is accurate as of 7/10/18  1:35 PM.  Always use your most recent med list.                   Brand Name Dispense Instructions for use Diagnosis    acetylcysteine 600 MG Caps capsule    N-ACETYL CYSTEINE    30 capsule    Take 2 400 mg caps two times daily for total daily dose of 800 mg        albuterol 108 (90 Base) MCG/ACT Inhaler    PROAIR HFA/PROVENTIL HFA/VENTOLIN HFA    1 Inhaler    Inhale 2 puffs into the lungs every 6 hours as needed for shortness of breath / dyspnea or wheezing    Exercise-induced asthma       ARIPiprazole 2 MG tablet    ABILIFY    15  tablet    Take 0.5 tablets (1 mg) by mouth daily    Major depressive disorder, recurrent episode, moderate (H)       aspirin 81 MG EC tablet     30 tablet    Take 1 tablet (81 mg) by mouth daily    Kidney replaced by transplant       BENADRYL 25 MG capsule   Generic drug:  diphenhydrAMINE      Take 25 mg by mouth At Bedtime.        blood glucose monitoring lancets     1 Box    Use to test blood sugar 2 times daily or as directed.    Type 2 diabetes mellitus with peripheral neuropathy (H)       * blood glucose monitoring meter device kit    no brand specified    1 kit    Use to test blood sugar 2 times daily or as directed.    Type 2 diabetes mellitus with peripheral neuropathy (H)       * blood glucose monitoring meter device kit           blood glucose monitoring test strip    no brand specified    100 strip    Use to test blood sugars 2 times daily or as directed    Type 2 diabetes mellitus with peripheral neuropathy (H)       cyanocobalamin 1000 MCG tablet    vitamin  B-12    30 tablet    Take 1 tablet by mouth daily.    CKD (chronic kidney disease) stage 5, GFR less than 15 ml/min (H)       * cycloSPORINE modified 25 MG capsule    GENERIC EQUIVALENT    300 capsule    Take 5 capsules (125 mg) by mouth 2 times daily    Kidney replaced by transplant       * cycloSPORINE modified 25 MG capsule    GENERIC EQUIVALENT    300 capsule    TAKE 5 CAPSULES (125MG) BY MOUTH TWO TIMES A DAY    Kidney replaced by transplant       econazole nitrate 1 % cream     85 g    Apply topically daily    Type II or unspecified type diabetes mellitus with neurological manifestations, not stated as uncontrolled(250.60) (H), Dermatophytosis of foot       furosemide 20 MG tablet    LASIX    90 tablet    Take 1 tablet (20 mg) by mouth daily    Generalized edema       latanoprost 0.005 % ophthalmic solution    XALATAN    7.5 mL    PLACE 1 DROP INTO BOTH EYES AT BEDTIME    Mild stage glaucoma(365.71)       metFORMIN 500 MG 24 hr tablet     GLUCOPHAGE-XR    90 tablet    Take 3 tablets (1,500 mg) by mouth daily (with dinner)    Type 2 diabetes mellitus with diabetic polyneuropathy, without long-term current use of insulin (H)       mycophenolate 250 MG capsule    GENERIC EQUIVALENT    240 capsule    Take 4 capsules (1,000 mg) by mouth 2 times daily    Kidney transplanted       omeprazole 40 MG capsule    priLOSEC    90 capsule    Take 1 capsule (40 mg) by mouth daily    Gastroesophageal reflux disease without esophagitis       ondansetron 4 MG ODT tab    ZOFRAN-ODT    20 tablet    Take 1 tablet (4 mg) by mouth every 6 hours as needed    Chronic kidney disease, stage V (H)       order for DME     1 Units    Walker with front wheels and a seat.    Fall, initial encounter       prazosin 5 MG capsule    MINIPRESS    90 capsule    Take 3 capsules (15 mg) by mouth At Bedtime    Nightmares associated with chronic post-traumatic stress disorder       REFRESH OP      Apply to eye as needed Both eyes        replens Gel     35 g    Use vaginally as needed. Can use up to 3 times per week.    Vaginal dryness       simvastatin 20 MG tablet    ZOCOR    90 tablet    Take 1 tablet (20 mg) by mouth At Bedtime    Chronic kidney disease, stage V (H)       sulfamethoxazole-trimethoprim 400-80 MG per tablet    BACTRIM/SEPTRA    90 tablet    Take 1 tablet by mouth daily    Immunosuppression (H)       topiramate 200 MG tablet    TOPAMAX    60 tablet    Take 1 tablet (200 mg) by mouth 2 times daily    Morbid obesity due to excess calories (H)       TYLENOL 325 MG tablet   Generic drug:  acetaminophen      Take 325-650 mg by mouth as needed        vilazodone 40 MG Tabs tablet    VIIBRYD    30 tablet    Take 1 tablet (40 mg) by mouth daily    Major depressive disorder, recurrent episode, moderate (H)       vitamin D 1000 units capsule     30 capsule    2,000 Units        * Notice:  This list has 4 medication(s) that are the same as other medications prescribed for you. Read the  directions carefully, and ask your doctor or other care provider to review them with you.

## 2018-07-31 ENCOUNTER — ALLIED HEALTH/NURSE VISIT (OUTPATIENT)
Dept: SURGERY | Facility: CLINIC | Age: 63
End: 2018-07-31
Payer: MEDICARE

## 2018-07-31 VITALS — BODY MASS INDEX: 28.89 KG/M2 | HEIGHT: 61 IN | WEIGHT: 153 LBS

## 2018-07-31 DIAGNOSIS — Z48.298 AFTERCARE FOLLOWING ORGAN TRANSPLANT: ICD-10-CM

## 2018-07-31 LAB
ANION GAP SERPL CALCULATED.3IONS-SCNC: 10 MMOL/L (ref 3–14)
BUN SERPL-MCNC: 17 MG/DL (ref 7–30)
CALCIUM SERPL-MCNC: 9 MG/DL (ref 8.5–10.1)
CHLORIDE SERPL-SCNC: 108 MMOL/L (ref 94–109)
CO2 SERPL-SCNC: 22 MMOL/L (ref 20–32)
CREAT SERPL-MCNC: 0.93 MG/DL (ref 0.52–1.04)
CYCLOSPORINE BLD LC/MS/MS-MCNC: 100 UG/L (ref 50–400)
ERYTHROCYTE [DISTWIDTH] IN BLOOD BY AUTOMATED COUNT: 15.1 % (ref 10–15)
GFR SERPL CREATININE-BSD FRML MDRD: 61 ML/MIN/1.7M2
GLUCOSE SERPL-MCNC: 150 MG/DL (ref 70–99)
HCT VFR BLD AUTO: 39 % (ref 35–47)
HGB BLD-MCNC: 12 G/DL (ref 11.7–15.7)
MCH RBC QN AUTO: 26.1 PG (ref 26.5–33)
MCHC RBC AUTO-ENTMCNC: 30.8 G/DL (ref 31.5–36.5)
MCV RBC AUTO: 85 FL (ref 78–100)
PLATELET # BLD AUTO: 178 10E9/L (ref 150–450)
POTASSIUM SERPL-SCNC: 3.8 MMOL/L (ref 3.4–5.3)
RBC # BLD AUTO: 4.59 10E12/L (ref 3.8–5.2)
SODIUM SERPL-SCNC: 140 MMOL/L (ref 133–144)
TME LAST DOSE: NORMAL H
WBC # BLD AUTO: 3.9 10E9/L (ref 4–11)

## 2018-07-31 NOTE — PROGRESS NOTES
"Nutrition Reassessment  Reason For Visit:  Elizabeth Palma is a 62 year-old female with type 2 DM (oral med management) presenting today for nutrition follow-up, s/p \"gastric bypass in 1990 at [facility]\" per Pt report.  She is seeing medical weight management.  She was referred by Dr. Irene.  Note: Pt had a kidney transplant on March 20, 2014.    Pt was accompanied by her .     Anthropometrics:  Height: 61\"  Pre-op Weight: 265 lbs per Pt report; lowest weight after bariatric surgery: 165-170 lbs (25 years ago)  (Pt reported that she initially was at 215 lbs in 2015 prior to seeing writer.)    Current Weight: 153 lbs (+0.7 lbs over the past month) with BMI of 28.97.    Current Vitamins/Minerals: 3000 International Units vitamin D/day, Calcium, vitamin C, vitamin B12 daily, B-complex  *Pt stated that she was told not to take other vitamins/minerals by MD.    Nutrition History:  Lactose intolerance ever since bariatric surgery.  Pt stated that she has osteoporosis; however, she stated that her doctors don't want her to take any vitamins or minerals due to her kidney transplant.    Diet/Nutrition Intake Hx: Pt reports her intake varies due to fluctuating appetite. Per pt there are days when she eats 3 meals but on other days she may not eat much at all or nibble on something through out the day. Pt also states her intake is affected by nausea 2/2 her anti-rejection medications. However pt reports she tries to make healthy choices (lower in calorie). Pt is also limited in protein choices that she likes - pt reports she does not take much dairy, does not like beans and lentils, keeps kosher. Advised pt to try to time her meals and eat every 4 hours instead of grazing but pt refused stating she would rather not eat at all.     *Pt stated that she will not record food intake due to the fact that every time she does this, she simply does not eat as she doesn't want to record.  She stated that her therapist strongly " "advises against recording food intake for this reason.    Physical Activity Note: Pt reports she has a tendency to break bones so she is limited with what exercises she does. Pt states there is a long hallway in the building that she needs to walk when going out. Pt states she does not walk for exercises but walks only to get ADLs done.      Progress with Previous Goals:    1. Avoid grazing through, Eat 3 meals with lean protein at each meal, along with a non-starchy vegetable or whole fruit, up to 1/2 c carb at a meal. - Pt reported that she hasn't been focusing on protein as much lately.  She doesn't eat breakfast, but has 1/2 sandwich containing protein (cheese or turkey or egg) for lunch, snack of 1 fruit or crackers/popcorn, and a few bites of steak/fish with broccoli.  Pt dislikes protein shakes and yogurt.    -Try hard-boiled eggs to put on your salad or to have later in the day to make up for skipping breakfast.    2. If needing a snack, have vegetables (give at least 2 hours between a meals and a snack).- Meeting; sometimes 1 fruit/day or crackers/popcorn (Skinny Pop).   3. Walk 10 min daily, increasing as able. - Meeting.      Nutrition Prescription:  Grams Protein: 60 (minimum) - Not meeting.   Amount of Fluid: 48-64 oz    Nutrition Diagnosis  Previous: Overweight related to history of excessive energy intake as evidenced by BMI > 25. - continues    Intervention  Intervention At Appointment:  Materials/Education provided:  Discussed importance of optimizing protein intake and options available (pharmaceutical-grade supplementation available from clinic).  Reviewed previous goals.  Gave encouragement and support.    *Note: Pt purchased the following protein bars from clinic today (160 Xavier, 15 g protein each): Vanilla, Lemon, Pretzel Toffee, which she may consume as a 3rd \"meal\" per day to get in her required protein instead of the popcorn/crackers.      Patient Understanding: good  Expected Compliance: " good with continued RD follow up.    Goals:   1. Avoid grazing through, Eat 3 meals with lean protein at each meal, along with a non-starchy vegetable or whole fruit, up to 1/2 c carb at a meal.   -Try hard-boiled eggs to put on your salad or to have later in the day to make up for skipping breakfast.    2. If needing a snack, have vegetables (give at least 2 hours between a meals and a snack).  3. Walk 10 min daily, increasing as able.    Follow-Up: 1 month    Time spent with patient: 15 minutes.  Francesca Danielle, MS, RDN, LDN, CLT  Pager: [phone]

## 2018-08-13 ENCOUNTER — TELEPHONE (OUTPATIENT)
Dept: TRANSPLANT | Facility: CLINIC | Age: 63
End: 2018-08-13

## 2018-08-13 NOTE — TELEPHONE ENCOUNTER
Prior Authorization Specialty Medication Request    Medication/Dose:   mycophenolate (GENERIC EQUIVALENT) 250 MG capsule Take 4 capsules (1,000 mg) by mouth 2 times daily     ICD code (if different than what is on RX):  Z94.0  Previously Tried and Failed:      Important Lab Values:   Rationale:     Insurance Name: Stephane  Insurance ID: [id number]  Insurance Phone Number: [phone]    Pharmacy Information (if different than what is on RX)  Name:    Phone:

## 2018-08-14 NOTE — TELEPHONE ENCOUNTER
[facility] Prior Authorization Team   Phone: [phone]  Fax: [phone]    Prior authorization is not required due to coverage is available under [facility] Medicare Part-B benefits.

## 2018-08-28 ENCOUNTER — RESULTS ONLY (OUTPATIENT)
Dept: OTHER | Facility: CLINIC | Age: 63
End: 2018-08-28

## 2018-08-28 ENCOUNTER — ALLIED HEALTH/NURSE VISIT (OUTPATIENT)
Dept: SURGERY | Facility: CLINIC | Age: 63
End: 2018-08-28
Payer: MEDICARE

## 2018-08-28 VITALS — WEIGHT: 151 LBS | HEIGHT: 61 IN | BODY MASS INDEX: 28.51 KG/M2

## 2018-08-28 DIAGNOSIS — Z94.0 KIDNEY REPLACED BY TRANSPLANT: ICD-10-CM

## 2018-08-28 DIAGNOSIS — Z48.298 AFTERCARE FOLLOWING ORGAN TRANSPLANT: ICD-10-CM

## 2018-08-28 DIAGNOSIS — Z79.899 ENCOUNTER FOR LONG-TERM CURRENT USE OF MEDICATION: ICD-10-CM

## 2018-08-28 LAB
ANION GAP SERPL CALCULATED.3IONS-SCNC: 9 MMOL/L (ref 3–14)
BUN SERPL-MCNC: 12 MG/DL (ref 7–30)
CALCIUM SERPL-MCNC: 9.1 MG/DL (ref 8.5–10.1)
CHLORIDE SERPL-SCNC: 109 MMOL/L (ref 94–109)
CO2 SERPL-SCNC: 22 MMOL/L (ref 20–32)
CREAT SERPL-MCNC: 0.84 MG/DL (ref 0.52–1.04)
CREAT UR-MCNC: 234 MG/DL
CYCLOSPORINE BLD LC/MS/MS-MCNC: 96 UG/L (ref 50–400)
ERYTHROCYTE [DISTWIDTH] IN BLOOD BY AUTOMATED COUNT: 14.7 % (ref 10–15)
GFR SERPL CREATININE-BSD FRML MDRD: 68 ML/MIN/1.7M2
GLUCOSE SERPL-MCNC: 149 MG/DL (ref 70–99)
HCT VFR BLD AUTO: 35.4 % (ref 35–47)
HGB BLD-MCNC: 11.1 G/DL (ref 11.7–15.7)
MCH RBC QN AUTO: 26.1 PG (ref 26.5–33)
MCHC RBC AUTO-ENTMCNC: 31.4 G/DL (ref 31.5–36.5)
MCV RBC AUTO: 83 FL (ref 78–100)
PLATELET # BLD AUTO: 200 10E9/L (ref 150–450)
POTASSIUM SERPL-SCNC: 3.6 MMOL/L (ref 3.4–5.3)
PROT UR-MCNC: 0.45 G/L
PROT/CREAT 24H UR: 0.19 G/G CR (ref 0–0.2)
RBC # BLD AUTO: 4.25 10E12/L (ref 3.8–5.2)
SODIUM SERPL-SCNC: 140 MMOL/L (ref 133–144)
TME LAST DOSE: NORMAL H
WBC # BLD AUTO: 4 10E9/L (ref 4–11)

## 2018-08-28 NOTE — MR AVS SNAPSHOT
MRN:[id number]                      After Visit Summary   8/28/2018    Elizabeth Palma    MRN: [id number]           Visit Information        Provider Department      8/28/2018 9:30 AM Francesca Danielle RD [facility] Surgical Weight Management        Your next 10 appointments already scheduled     Sep 18, 2018  1:00 PM CDT   Adult Med Follow UP with Chaparrita Hatch MD   [facility] Psychiatry ([facility])    [address]  [facility] [address]   [phone]            Sep 25, 2018  9:00 AM CDT   Lab with  LAB   [facility] Lab ([facility])    [address]  [address] [facility] [id number] [phone]            Sep 25, 2018  9:30 AM CDT   (Arrive by 9:15 AM)   Return Bariatric Nutrition Visit with Leyla Guerrero RD   [facility] Surgical Weight Management ([facility])    [address] [place] [address]  [address] [facility] [id number] [phone]            Oct 30, 2018  9:30 AM CDT   (Arrive by 9:15 AM)   NUTRITION VISIT with Francesca Danielle RD   [facility] Surgical Weight Management ([facility])    [address] [place] [id number] [phone]            Nov 05, 2018 12:00 PM CST   DX HIP/PELVIS/SPINE with DX1   [facility] ([facility])    [address] [place] [id number] [phone]           Please do not take any of the following 24 hours prior to the day of your exam: vitamins, calcium tablets, antacids.  If possible, please wear clothes without metal (snaps, zippers). A sweatsuit works well.            Nov 05, 2018  1:00 PM CST   (Arrive by 12:45 PM)   RETURN ENDOCRINE with Lizbet Byers MD   [facility] Endocrinology ([facility])    [address] [place] [address]  [address] [facility] [id number] [phone]            Nov 12,  2018 10:30 AM CST   (Arrive by 10:15 AM)   Return Visit with Prakash Irene MD   [facility] Medical Weight Management ([facility])    [address]  4th Floor  [facility] [address]   [phone]            Nov 27, 2018  9:30 AM CST   (Arrive by 9:15 AM)   NUTRITION VISIT with Leyla Guerrero RD   [facility] Surgical Weight Management ([facility])    [address]  4[address] [facility] [address]   [phone]            Mar 18, 2019  2:05 PM CDT   (Arrive by 1:35 PM)   Return Kidney Transplant with  Kidney/Pancreas Recipient   [facility] Nephrology ([facility])    [address]  [address]  [facility] [address] [phone]              [employer] Information     [email or web address] gives you secure access to your electronic health record. If you see a primary care provider, you can also send messages to your care team and make appointments. If you have questions, please call your primary care clinic.  If you do not have a primary care provider, please call [phone] and they will assist you.      [email or web address] is an electronic gateway that provides easy, online access to your medical records. With [email or web address], you can request a clinic appointment, read your test results, renew a prescription or communicate with your care team.     To access your existing account, please contact your [facility] or call [phone] for assistance.        Care EveryWhere ID     This is your Care EveryWhere ID. This could be used by other organizations to access your [facility] medical records  [id number]        Equal Access to Services     LINNEA HIDALGO : Hadii riky richter Somartín, waaxda luqadaha, qaybta kaalty ghotra . So [facility] [phone].    ATENCIÓN: Si habla español, tiene a goetz disposición servicios gratuitos de asistencia lingüística. Llame al  [phone].    We comply with applicable federal civil rights laws and [place] laws. We do not discriminate on the basis of race, color, national origin, age, disability, sex, sexual orientation, or gender identity.

## 2018-08-28 NOTE — PROGRESS NOTES
"Nutrition Reassessment  Reason For Visit:  Elizabeth Palma is a 63 year-old female with type 2 DM (oral med management) presenting today for nutrition follow-up, s/p \"gastric bypass in 1990 at [facility]\" per Pt report.  She is seeing medical weight management.  She was referred by Dr. Irene.  Note: Pt had a kidney transplant on March 20, 2014.    Pt was accompanied by her .     Anthropometrics:  Height: 61\"  Pre-op Weight: 265 lbs per Pt report; lowest weight after bariatric surgery: 165-170 lbs (25 years ago)  (Pt reported that she initially was at 215 lbs in 2015 prior to seeing writer.)    Current Weight: 151 lbs (-2 lbs over the past month) with BMI of 28.59.    Current Vitamins/Minerals: 3000 International Units vitamin D/day, Calcium, vitamin C, vitamin B12 daily, B-complex  *Pt stated that she was told not to take other vitamins/minerals by MD.    Nutrition History:  Lactose intolerance ever since bariatric surgery.  Pt stated that she has osteoporosis; however, she stated that her doctors don't want her to take any vitamins or minerals due to her kidney transplant.    Diet/Nutrition Intake Hx: Pt reports her intake varies due to fluctuating appetite. Per pt there are days when she eats 3 meals but on other days she may not eat much at all or nibble on something through out the day. Pt also states her intake is affected by nausea 2/2 her anti-rejection medications. However pt reports she tries to make healthy choices (lower in calorie). Pt is also limited in protein choices that she likes - pt reports she does not take much dairy, does not like beans and lentils, keeps kosher. Advised pt to try to time her meals and eat every 4 hours instead of grazing but pt refused stating she would rather not eat at all.     *Pt stated that she will not record food intake due to the fact that every time she does this, she simply does not eat as she doesn't want to record.  She stated that her therapist strongly " advises against recording food intake for this reason.    Physical Activity Note: Pt reports she has a tendency to break bones so she is limited with what exercises she does. Pt states there is a long hallway in the building that she needs to walk when going out. Pt states she does not walk for exercises but walks only to get ADLs done.      Progress with Previous Goals:    1. Avoid grazing through, Eat 3 meals with lean protein at each meal, along with a non-starchy vegetable or whole fruit, up to 1/2 c carb at a meal.  - Pt has been getting more protein daily; using the NaventatrYouFastUnlock Bars.    -Try hard-boiled eggs to put on your salad or to have later in the day to make up for skipping breakfast.    2. If needing a snack, have vegetables (give at least 2 hours between a meals and a snack).- Meeting.   3. Walk 10 min daily, increasing as able. - Meeting most days.     - Pt went to the [facility] and had vegetables (took off the deep-fried coating), flavored ice, and a little roasted corn.  She did a lot of walking at the [facility].        Nutrition Prescription:  Grams Protein: 60 (minimum) - Not meeting consistently.   Amount of Fluid: 48-64 oz    Nutrition Diagnosis  Previous: Overweight related to history of excessive energy intake as evidenced by BMI > 25. - continues    Intervention  Intervention At Appointment:  Materials/Education provided:  Discussed importance of optimizing protein intake and options available (pharmaceutical-grade supplementation available from clinic).  Reviewed previous goals.  Gave encouragement and support.    *Note: Pt purchased the following protein bars from clinic today (160 Xavier, 15 g protein each): Vanilla and Lemon.      Patient Understanding: good  Expected Compliance: good with continued RD follow up.    Goals:   1. Avoid grazing through, Eat 3 meals with lean protein at each meal, along with a non-starchy vegetable or whole fruit, up to 1/2 c carb at a meal.   -Try  hard-boiled eggs to put on your salad or to have later in the day to make up for skipping breakfast.    2. If needing a snack, have vegetables (give at least 2 hours between a meals and a snack).  3. Walk 10 min daily, increasing as able.    Follow-Up: 1 month    Time spent with patient: 15 minutes.  Francesca Danielle MS, RDN, LDN, CLT  Pager: [phone]

## 2018-08-29 DIAGNOSIS — H40.9 MILD STAGE GLAUCOMA(365.71): ICD-10-CM

## 2018-08-29 LAB
BKV DNA # SPEC NAA+PROBE: NORMAL COPIES/ML
BKV DNA SPEC NAA+PROBE-LOG#: NORMAL LOG COPIES/ML
PRA DONOR SPECIFIC ABY: NORMAL
SPECIMEN SOURCE: NORMAL

## 2018-08-30 LAB
DONOR IDENTIFICATION: NORMAL
DSA COMMENTS: NORMAL
DSA PRESENT: NO
DSA TEST METHOD: NORMAL
ORGAN: NORMAL
SA1 CELL: NORMAL
SA1 COMMENTS: NORMAL
SA1 HI RISK ABY: NORMAL
SA1 MOD RISK ABY: NORMAL
SA1 TEST METHOD: NORMAL
SA2 CELL: NORMAL
SA2 COMMENTS: NORMAL
SA2 HI RISK ABY UA: NORMAL
SA2 MOD RISK ABY: NORMAL
SA2 TEST METHOD: NORMAL

## 2018-08-30 RX ORDER — LATANOPROST 50 UG/ML
1 SOLUTION/ DROPS OPHTHALMIC AT BEDTIME
Qty: 7.5 ML | Refills: 2 | Status: SHIPPED | OUTPATIENT
Start: 2018-08-30 | End: 2019-07-31

## 2018-08-30 NOTE — TELEPHONE ENCOUNTER
latanoprost (XALATAN) 0.005 % ophthalmic solution     Last Written Prescription Date:  5/11/18  Last Fill Quantity: 7.5ml,   # refills: 0  Last Office Visit :4/25/18  Future Office visit:  None    Using latanoprost at bedtime both eyes with good compliance

## 2018-09-07 DIAGNOSIS — J45.990 EXERCISE-INDUCED ASTHMA: ICD-10-CM

## 2018-09-07 NOTE — TELEPHONE ENCOUNTER
albuterol    Last Written Prescription Date:  8/17/17  Last Fill Quantity:1,   # refills: 11  Last Office Visit : 4/16/18  Future Office visit:  No future appt    Routing refill request to nurse for review/approval because:  Drug failed the [facility], [facility] or [facility] refill protocol

## 2018-09-10 RX ORDER — ALBUTEROL SULFATE 90 UG/1
2 AEROSOL, METERED RESPIRATORY (INHALATION) EVERY 6 HOURS PRN
Qty: 1 INHALER | Refills: 5 | Status: SHIPPED | OUTPATIENT
Start: 2018-09-10 | End: 2019-11-27

## 2018-09-13 ENCOUNTER — OFFICE VISIT (OUTPATIENT)
Dept: INTERNAL MEDICINE | Facility: CLINIC | Age: 63
End: 2018-09-13
Payer: MEDICARE

## 2018-09-13 VITALS
RESPIRATION RATE: 16 BRPM | BODY MASS INDEX: 28.91 KG/M2 | DIASTOLIC BLOOD PRESSURE: 83 MMHG | SYSTOLIC BLOOD PRESSURE: 130 MMHG | WEIGHT: 153 LBS | HEART RATE: 63 BPM

## 2018-09-13 DIAGNOSIS — N39.41 URGENCY INCONTINENCE: Primary | ICD-10-CM

## 2018-09-13 DIAGNOSIS — J30.2 SEASONAL ALLERGIC RHINITIS, UNSPECIFIED CHRONICITY, UNSPECIFIED TRIGGER: ICD-10-CM

## 2018-09-13 LAB
ALBUMIN UR-MCNC: NEGATIVE MG/DL
APPEARANCE UR: ABNORMAL
BACTERIA #/AREA URNS HPF: ABNORMAL /HPF
BILIRUB UR QL STRIP: NEGATIVE
COLOR UR AUTO: YELLOW
GLUCOSE UR STRIP-MCNC: NEGATIVE MG/DL
HGB UR QL STRIP: NEGATIVE
KETONES UR STRIP-MCNC: NEGATIVE MG/DL
LEUKOCYTE ESTERASE UR QL STRIP: ABNORMAL
MUCOUS THREADS #/AREA URNS LPF: PRESENT /LPF
NITRATE UR QL: NEGATIVE
PH UR STRIP: 5 PH (ref 5–7)
RBC #/AREA URNS AUTO: 3 /HPF (ref 0–2)
SOURCE: ABNORMAL
SP GR UR STRIP: 1.02 (ref 1–1.03)
SQUAMOUS #/AREA URNS AUTO: <1 /HPF (ref 0–1)
UROBILINOGEN UR STRIP-MCNC: 0 MG/DL (ref 0–2)
WBC #/AREA URNS AUTO: 19 /HPF (ref 0–5)

## 2018-09-13 RX ORDER — FLUTICASONE PROPIONATE 50 MCG
1 SPRAY, SUSPENSION (ML) NASAL DAILY
Qty: 1 BOTTLE | Refills: 0 | Status: SHIPPED | OUTPATIENT
Start: 2018-09-13 | End: 2019-01-13

## 2018-09-13 ASSESSMENT — PAIN SCALES - GENERAL: PAINLEVEL: NO PAIN (0)

## 2018-09-13 NOTE — PROGRESS NOTES
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    Patient has noted symptoms of urinary incontinence for the past few months.  Notes that on days which she has symptoms, will notes strong urge to urinate but will only urinate \"a small amount\" or will be unable to make it to the bathroom in time and will wet herself. She states that her symptoms occur randomly, they are not associated with laughing or coughing.  States that the frequency has been increasing occurring up to \"75%\" of days in the past month. Denies fever, chills, dysuria, malodorous urine, back pain, back trauma, fecal incontinence, groin numbness, abdominal pain, vaginal discharge, pelvic pain.    Medications and allergies reviewed by me today.     ROS:   Constitutional, HEENT, cardiovascular, pulmonary, gi and gu systems are negative, except as otherwise noted.    OBJECTIVE:    /83  Pulse 63  Resp 16  Wt 69.4 kg (153 lb)  Breastfeeding? No  BMI 28.91 kg/m2   Wt Readings from Last 1 Encounters:   09/13/18 69.4 kg (153 lb)     GEN: pleasant, NAD  CV: rrr, no m/r/g  Lung: ctab  Abd: soft, nt, nd, nabs, midline surgical scar, transplant palpable in RLQ, no pelvic pain     ASSESSMENT/PLAN:    Elizabeth was seen today for incontinence.    Diagnoses and all orders for this visit:    Urgency incontinence  Patient symptoms most consistent with urge incontinence.  No red flag symptoms for cauda equina syndrome. Not consistent with overflow incontinence as patient does have symptoms which preceded episodes of incontinence.  Not consistent with stress incontinence as patient symptoms are not brought on by increased abdominal pressure.  Will evaluate for infection as this is an easily treatable cause of incontinence.  Postvoid residual in clinic 0 cc.  Patient has not " had abdominal CT since , note patient's plus the kidney disease with native kidneys still present. Possibility of physical compression of bladder either from polycystic kidneys or fibroids.  -     MEASURE POST-VOID RESIDUAL URINE/BLADDER CAPACITY, US NON-IMAGING  -     UA with Micro reflex to Culture; Future  - Provided patient with pelvic floor exercises as well as bladder training exercises  - Patient to discontinue diphenhydramine she notes that this is not effective for her allergies  - Consider abdominal imaging (CT vs U/S) at f/u if symptoms have not improved  - May consider oxybutynin at follow-up if symptoms have not improved    Seasonal allergic rhinitis, unspecified chronicity, unspecified trigger  -     fluticasone (FLONASE) 50 MCG/ACT spray; Spray 1 spray into both nostrils daily    Pt should return to clinic for f/u with me or PCP in 3 weeks     Andrés Rowley MD  Sep 13, 2018    Pt was seen and examined with Dr. Rowley; confirmed benign abdominal exam;  I agree with the detailed A/P documentation above    Anastasiya Pearce MD

## 2018-09-13 NOTE — PATIENT INSTRUCTIONS
Primary Care Center Phone Number [phone]  Primary Care Center Medication Refill Request Information:  * Please contact your pharmacy regarding ANY request for medication refills.  ** [facility] Prescription Fax = [phone]  * Please allow 3 business days for routine medication refills.  * Please allow 5 business days for controlled substance medication refills.     Primary Care Center Test Result notification information:  *You will be notified with in 7-10 days of your appointment day regarding the results of your test.  If you are on MyChart you will be notified as soon as the provider has reviewed the results and signed off on them.                 [facility]         Internal Medicine Resident                   Continuity Clinic    Who We Are    Resident Continuity Clinic is a part of the [facility].  Resident physicians see patients independently and establish a relationship with them over the course of their three-year residency program.  As with the Primary Care Clinic, our Resident Continuity Clinic models a group practice.  If your doctor is not available, you will be able to see another resident physician.  At the end of a resident s training, patients will be transitioned to a new resident physician for ongoing care.     We treat patients with a wide array of medical needs from routine physicals, to acute illnesses, to diabetes and blood pressure management, to complex medical illness.  What is a Resident Physician?    Resident physicians hold medical degrees and are doctors. They are training to become specialists in Internal Medicine. They work under the supervision of board-certified faculty physicians.  Expectations for Your Care    We strive to provide accessible, quality care at all times.    In order to provide this care, it is best to see your primary care resident doctor consistently rather switching between providers.  In the event you do see another physician, you  should schedule a follow-up visit with your usual primary care doctor.    If you are transitioning your care from another clinic, it is helpful to have your records available for your doctor to review.    We do not prescribe controlled substances, such as ADD medications or narcotic pain medications, on your first visit.  We will review your health records and concerns prior to devising a treatment plan with you in order to provide the best care.      Clinic Services     Extended clinic hours; patient  to help navigate your visit;  parking; laboratory and imaging services with evening and weekend hours    Multiple medical and surgical specialties in one building    Complementary services, including Nutrition, Integrative Medicine, Pharmacy consultations, Mental and Behavioral Health, Sports Medicine and Physical Therapy    Thank You    We would like to thank you for choosing the [facility] Internal [facility] Resident Continuity Clinic for your primary care. You are making a priceless contribution to the training of the next generation of health care practitioners.     Contact us at [phone] for appointments or questions.    Resident Clinic Hours are Tuesdays and Thursdays, 7:30am-5:00pm    Residents  Kristen Flower MD   (Female )   Francesca Gamboa MD   (Female)   Garrick Fraser MD  (Male)   Gabriel Aldrich MD  (Male)   Anali Wilson MD   (Female)   Alan Knight MD  (Male)    Kt Heredia MD  (Male)   Cesar Linares MD  (Male)   Gabriel Rowley MD (Male)   Jeremy Miller MD  (Male)   Apoorva Ham MD (Female)    Marissa Escoto MD (Female)   Germán Lane MD  (Male)   Sandra Garcia MD(Female)   Mary Stevenson MD  (Female)    Supervising Physicians   MD Maisha Samuel MD Briar Duffy, MD James Langland, MD Mary Logeais, MD Tanya Melnik, MD Charles Moldow, MD Heather Thompson Buum, MD Kathleen Watson, MD          - keep your  appointment with Dr Sheridan in October

## 2018-09-13 NOTE — MR AVS SNAPSHOT
After Visit Summary   9/13/2018    Elizabeth Palma    MRN: [id number]           Patient Information     Date Of Birth          1955        Visit Information        Provider Department      9/13/2018 9:55 AM Andrés Rowley MD [facility]        Today's Diagnoses     Urgency incontinence    -  1    Seasonal allergic rhinitis, unspecified chronicity, unspecified trigger          Care Instructions    Primary Care Center Phone Number [phone]  Primary Care Center Medication Refill Request Information:  * Please contact your pharmacy regarding ANY request for medication refills.  ** [facility] Prescription Fax = [phone]  * Please allow 3 business days for routine medication refills.  * Please allow 5 business days for controlled substance medication refills.     Primary Care Center Test Result notification information:  *You will be notified with in 7-10 days of your appointment day regarding the results of your test.  If you are on MyChart you will be notified as soon as the provider has reviewed the results and signed off on them.                 [facility]         Internal Medicine Resident                   Continuity Clinic    Who We Are    Resident Continuity Clinic is a part of the [facility].  Resident physicians see patients independently and establish a relationship with them over the course of their three-year residency program.  As with the Primary Care Clinic, our Resident Continuity Clinic models a group practice.  If your doctor is not available, you will be able to see another resident physician.  At the end of a resident s training, patients will be transitioned to a new resident physician for ongoing care.     We treat patients with a wide array of medical needs from routine physicals, to acute illnesses, to diabetes and blood pressure management, to complex medical illness.  What is a Resident Physician?    Resident physicians hold  medical degrees and are doctors. They are training to become specialists in Internal Medicine. They work under the supervision of board-certified faculty physicians.  Expectations for Your Care    We strive to provide accessible, quality care at all times.    In order to provide this care, it is best to see your primary care resident doctor consistently rather switching between providers.  In the event you do see another physician, you should schedule a follow-up visit with your usual primary care doctor.    If you are transitioning your care from another clinic, it is helpful to have your records available for your doctor to review.    We do not prescribe controlled substances, such as ADD medications or narcotic pain medications, on your first visit.  We will review your health records and concerns prior to devising a treatment plan with you in order to provide the best care.      Clinic Services     Extended clinic hours; patient  to help navigate your visit;  parking; laboratory and imaging services with evening and weekend hours    Multiple medical and surgical specialties in one building    Complementary services, including Nutrition, Integrative Medicine, Pharmacy consultations, Mental and Behavioral Health, Sports Medicine and Physical Therapy    Thank You    We would like to thank you for choosing the [facility] Internal [facility] Resident Continuity Clinic for your primary care. You are making a priceless contribution to the training of the next generation of health care practitioners.     Contact us at [phone] for appointments or questions.    Resident Clinic Hours are Tuesdays and Thursdays, 7:30am-5:00pm    Residents  Kristen Flower MD   (Female )   Francesca Gamboa MD   (Female)   Garrick Fraser MD  (Male)   Gabriel Aldrich MD  (Male)   Anali Wilson MD   (Female)   Alan Knight MD  (Male)    Kt Heredia MD  (Male)   Cesar Linares MD  (Male)   Gabriel Rowley,  MD (Male)   Jeremy Miller MD  (Male)   Apoorva Ham MD (Female)    Marissa Escoto MD (Female)   Germán Lane MD  (Male)   Sandra Garcia MD(Female)   Mary Stevenson MD  (Female)    Supervising Physicians   MD Maisha Samuel, MD Gautam Preciado, MD Yogesh Santacruz, MD Kathy Delarosa, MD Prakash Posey MD Heather Thompson Buum, MD Kathleen Watson, MD          - keep your appointment with Dr Sheridan in October          Follow-ups after your visit        Follow-up notes from your care team     Return in about 3 weeks (around 10/4/2018).      Your next 10 appointments already scheduled     Sep 18, 2018  1:00 PM CDT   Adult Med Follow UP with Chaparrita Hatch MD   [facility] ([facility])    [address] [place] [address]   [phone]            Sep 25, 2018  9:00 AM CDT   Lab with SHANNAN LAB   [facility] ([facility])    [address] [place] [address]   [phone]            Sep 25, 2018  9:30 AM CDT   (Arrive by 9:15 AM)   Return Bariatric Nutrition Visit with Leyla Guerrero RD   [facility] ([facility])    [address] [place] [address]   [phone]            Oct 01, 2018 12:30 PM CDT   Return Visit with Javier Tuttle DPM   [facility] ([facility])    [address] [place] [id number] [phone]            Oct 30, 2018  9:30 AM CDT   (Arrive by 9:15 AM)   NUTRITION VISIT with Francesca Danielle RD   [facility] ([facility])    [address] [place] [address]   [phone]            Nov 05, 2018 12:00 PM CST   DX HIP/PELVIS/SPINE with UCDX1   [facility] ([facility])    [address] [place] [address]  [address]  [address] [facility] [address]   [phone]           Please do not take any of the following 24 hours prior to the day of your exam: vitamins, calcium tablets, antacids.  If possible, please wear clothes without metal (snaps, zippers). A sweatsuit works well.            Nov 05, 2018  1:00 PM CST   (Arrive by 12:45 PM)   RETURN ENDOCRINE with Lizbet Byers MD   [facility] ([facility])    [address] [facility]  3rd Floor  [facility] [address] [phone]            Nov 12, 2018 10:30 AM CST   (Arrive by 10:15 AM)   Return Visit with Prakash Irene MD   [facility] Medical [facility] ([facility])    [address] [place] [address]  [address] [facility] [address]   [phone]            Nov 27, 2018  9:30 AM CST   (Arrive by 9:15 AM)   NUTRITION VISIT with Leyla Guerrero RD   [facility] ([facility])    [address] [place] [address]  [address] [facility] [address]   [phone]            Mar 18, 2019  2:05 PM CDT   (Arrive by 1:35 PM)   Return Kidney Transplant with  Kidney/Pancreas Recipient 1   [facility] Nephrology ([facility])    [address] [place] [address]  [address]  [facility] [address] [phone]              Future tests that were ordered for you today     Open Future Orders        Priority Expected Expires Ordered    UA with Micro reflex to Culture Routine 9/13/2018 9/13/2019 9/13/2018            Who to contact     Please call your clinic at [phone] to:    Ask questions about your health    Make or cancel appointments    Discuss your medicines    Learn about your test results    Speak to your doctor            Additional Information About Your Visit        [facility] Information     [employer] gives you secure access to your electronic health record. If you see a primary care provider, you can also send messages to your care  team and make appointments. If you have questions, please call your primary care clinic.  If you do not have a primary care provider, please call [phone] and they will assist you.      [employer] is an electronic gateway that provides easy, online access to your medical records. With [employer], you can request a clinic appointment, read your test results, renew a prescription or communicate with your care team.     To access your existing account, please contact your [facility] or call [phone] for assistance.        Care EveryWhere ID     This is your Care EveryWhere ID. This could be used by other organizations to access your [place] medical records  [id number]        Your Vitals Were     Pulse Respirations Breastfeeding? BMI (Body Mass Index)          63 16 No 28.91 kg/m2         Blood Pressure from Last 3 Encounters:   09/13/18 130/83   07/10/18 124/78   07/02/18 122/56    Weight from Last 3 Encounters:   09/13/18 69.4 kg (153 lb)   08/28/18 68.5 kg (151 lb)   07/31/18 69.4 kg (153 lb)              We Performed the Following     MEASURE POST-VOID RESIDUAL URINE/BLADDER CAPACITY, US NON-IMAGING          Today's Medication Changes          These changes are accurate as of 9/13/18 10:43 AM.  If you have any questions, ask your nurse or doctor.               Start taking these medicines.        Dose/Directions    fluticasone 50 MCG/ACT spray   Commonly known as:  FLONASE   Used for:  Seasonal allergic rhinitis, unspecified chronicity, unspecified trigger   Started by:  Andrés Rowley MD        Dose:  1 spray   Spray 1 spray into both nostrils daily   Quantity:  1 Bottle   Refills:  0         These medicines have changed or have updated prescriptions.        Dose/Directions    ARIPiprazole 2 MG tablet   Commonly known as:  ABILIFY   This may have changed:  how much to take   Used for:  Major depressive disorder, recurrent episode, moderate (H)        Dose:  1 mg   Take 0.5  tablets (1 mg) by mouth daily   Quantity:  15 tablet   Refills:  3       cyanocobalamin 1000 MCG tablet   Commonly known as:  vitamin  B-12   This may have changed:  when to take this   Used for:  CKD (chronic kidney disease) stage 5, GFR less than 15 ml/min (H)        Dose:  1000 mcg   Take 1 tablet by mouth daily.   Quantity:  30 tablet   Refills:  0         Stop taking these medicines if you haven't already. Please contact your care team if you have questions.     BENADRYL 25 MG capsule   Generic drug:  diphenhydrAMINE   Stopped by:  Andrés Rowley MD                Where to get your medicines      These medications were sent to [facility] - [place], [place] - [address] [facility] [address]  [address], [facility] [address]     Phone:  [phone]     fluticasone 50 MCG/ACT spray                Primary Care Provider Office Phone # Fax #    Horacio Sheridan [phone]       [address] [place] [address]  [facility] [address]        Equal Access to Services     [facility]: Hadii aad ku hadasho Soomaali, waaxda luqadaha, qaybta kaalmada adeegyada, waxay idiin haymicah agrawal . So [facility] [phone].    ATENCIÓN: Si habla español, tiene a goetz disposición servicios gratuitos de asistencia lingüística. [facility] [phone].    We comply with applicable federal civil rights laws and [place] laws. We do not discriminate on the basis of race, color, national origin, age, disability, sex, sexual orientation, or gender identity.            Thank you!     Thank you for choosing [facility]  for your care. Our goal is always to provide you with excellent care. Hearing back from our patients is one way we can continue to improve our services. Please take a few minutes to complete the written survey that you may receive in the mail after your visit with us. Thank you!             Your Updated Medication List - Protect others around you: Learn how to safely  use, store and throw away your medicines at www.disposemymeds.org.          This list is accurate as of 9/13/18 10:43 AM.  Always use your most recent med list.                   Brand Name Dispense Instructions for use Diagnosis    acetylcysteine 600 MG Caps capsule    N-ACETYL CYSTEINE    30 capsule    Take 2 400 mg caps two times daily for total daily dose of 800 mg        albuterol 108 (90 Base) MCG/ACT inhaler    PROAIR HFA/PROVENTIL HFA/VENTOLIN HFA    1 Inhaler    Inhale 2 puffs into the lungs every 6 hours as needed for shortness of breath / dyspnea or wheezing    Exercise-induced asthma       ARIPiprazole 2 MG tablet    ABILIFY    15 tablet    Take 0.5 tablets (1 mg) by mouth daily    Major depressive disorder, recurrent episode, moderate (H)       aspirin 81 MG EC tablet     30 tablet    Take 1 tablet (81 mg) by mouth daily    Kidney replaced by transplant       blood glucose monitoring lancets     1 Box    Use to test blood sugar 2 times daily or as directed.    Type 2 diabetes mellitus with peripheral neuropathy (H)       * blood glucose monitoring meter device kit    no brand specified    1 kit    Use to test blood sugar 2 times daily or as directed.    Type 2 diabetes mellitus with peripheral neuropathy (H)       * blood glucose monitoring meter device kit           blood glucose monitoring test strip    no brand specified    100 strip    Use to test blood sugars 2 times daily or as directed    Type 2 diabetes mellitus with peripheral neuropathy (H)       cyanocobalamin 1000 MCG tablet    vitamin  B-12    30 tablet    Take 1 tablet by mouth daily.    CKD (chronic kidney disease) stage 5, GFR less than 15 ml/min (H)       * cycloSPORINE modified 25 MG capsule    GENERIC EQUIVALENT    300 capsule    Take 5 capsules (125 mg) by mouth 2 times daily    Kidney replaced by transplant       * cycloSPORINE modified 25 MG capsule    GENERIC EQUIVALENT    300 capsule    TAKE 5 CAPSULES (125MG) BY MOUTH TWO TIMES  A DAY    Kidney replaced by transplant       econazole nitrate 1 % cream     85 g    Apply topically daily    Type II or unspecified type diabetes mellitus with neurological manifestations, not stated as uncontrolled(250.60) (H), Dermatophytosis of foot       fluticasone 50 MCG/ACT spray    FLONASE    1 Bottle    Spray 1 spray into both nostrils daily    Seasonal allergic rhinitis, unspecified chronicity, unspecified trigger       furosemide 20 MG tablet    LASIX    90 tablet    Take 1 tablet (20 mg) by mouth daily    Generalized edema       latanoprost 0.005 % ophthalmic solution    XALATAN    7.5 mL    Place 1 drop into both eyes At Bedtime    Mild stage glaucoma(365.71)       metFORMIN 500 MG 24 hr tablet    GLUCOPHAGE-XR    90 tablet    Take 3 tablets (1,500 mg) by mouth daily (with dinner)    Type 2 diabetes mellitus with diabetic polyneuropathy, without long-term current use of insulin (H)       mycophenolate 250 MG capsule    GENERIC EQUIVALENT    240 capsule    Take 4 capsules (1,000 mg) by mouth 2 times daily    Kidney transplanted       omeprazole 40 MG capsule    priLOSEC    90 capsule    Take 1 capsule (40 mg) by mouth daily    Gastroesophageal reflux disease without esophagitis       ondansetron 4 MG ODT tab    ZOFRAN-ODT    20 tablet    Take 1 tablet (4 mg) by mouth every 6 hours as needed    Chronic kidney disease, stage V (H)       order for DME     1 Units    Walker with front wheels and a seat.    Fall, initial encounter       prazosin 5 MG capsule    MINIPRESS    90 capsule    Take 3 capsules (15 mg) by mouth At Bedtime    Nightmares associated with chronic post-traumatic stress disorder       REFRESH OP      Apply to eye as needed Both eyes        replens Gel     35 g    Use vaginally as needed. Can use up to 3 times per week.    Vaginal dryness       simvastatin 20 MG tablet    ZOCOR    90 tablet    Take 1 tablet (20 mg) by mouth At Bedtime    Chronic kidney disease, stage V (H)        sulfamethoxazole-trimethoprim 400-80 MG per tablet    BACTRIM/SEPTRA    90 tablet    Take 1 tablet by mouth daily    Immunosuppression (H)       topiramate 200 MG tablet    TOPAMAX    60 tablet    Take 1 tablet (200 mg) by mouth 2 times daily    Morbid obesity due to excess calories (H)       TYLENOL 325 MG tablet   Generic drug:  acetaminophen      Take 325-650 mg by mouth as needed        vilazodone 40 MG Tabs tablet    VIIBRYD    30 tablet    Take 1 tablet (40 mg) by mouth daily    Major depressive disorder, recurrent episode, moderate (H)       vitamin D 1000 units capsule     30 capsule    2,000 Units        * Notice:  This list has 4 medication(s) that are the same as other medications prescribed for you. Read the directions carefully, and ask your doctor or other care provider to review them with you.

## 2018-09-13 NOTE — NURSING NOTE
Chief Complaint   Patient presents with     Incontinence     on and off x 2 months        Kristen Perry CMA at 9:54 AM on 9/13/2018

## 2018-09-14 LAB
BACTERIA SPEC CULT: NORMAL
Lab: NORMAL
SPECIMEN SOURCE: NORMAL

## 2018-09-15 LAB
BACTERIA SPEC CULT: NORMAL
SPECIMEN SOURCE: NORMAL

## 2018-09-18 ENCOUNTER — OFFICE VISIT (OUTPATIENT)
Dept: PSYCHIATRY | Facility: CLINIC | Age: 63
End: 2018-09-18
Payer: MEDICARE

## 2018-09-18 VITALS — SYSTOLIC BLOOD PRESSURE: 123 MMHG | HEART RATE: 78 BPM | DIASTOLIC BLOOD PRESSURE: 81 MMHG | TEMPERATURE: 99.3 F

## 2018-09-18 DIAGNOSIS — F33.1 MAJOR DEPRESSIVE DISORDER, RECURRENT EPISODE, MODERATE (H): ICD-10-CM

## 2018-09-18 RX ORDER — ARIPIPRAZOLE 2 MG/1
2 TABLET ORAL DAILY
COMMUNITY
Start: 2018-09-18 | End: 2018-09-25

## 2018-09-18 ASSESSMENT — PAIN SCALES - GENERAL: PAINLEVEL: NO PAIN (0)

## 2018-09-18 NOTE — MR AVS SNAPSHOT
After Visit Summary   9/18/2018    Elizabeth Palma    MRN: [id number]           Patient Information     Date Of Birth          1955        Visit Information        Provider Department      9/18/2018 1:00 PM Chaparrita Hatch MD [facility]        Today's Diagnoses     Major depressive disorder, recurrent episode, moderate (H)           Follow-ups after your visit        Follow-up notes from your care team     Return in about 4 weeks (around 10/16/2018) for 30 min. [facility].      Your next 10 appointments already scheduled     Oct 16, 2018 10:30 AM CDT   (Arrive by 10:15 AM)   Return Visit with Horacio Sheridan MD   [facility] ([facility])    [address] [place] [address]  4th [facility] [address]   [phone]            Oct 30, 2018  9:30 AM CDT   (Arrive by 9:15 AM)   Return Bariatric Nutrition Visit with Carrie Chawla RD   [facility] ([facility])    [address] [place] [address]  4th [facility] [address]   [phone]            Nov 05, 2018 12:00 PM CST   DX HIP/PELVIS/SPINE with UCDX1   [facility] ([facility])    [address] [place] [address]   [phone]           How do I prepare for my exam? (Food and drink instructions) No Food and Drink Restrictions.  How do I prepare for my exam? (Other instructions) Please do not take any of the following 24 hours prior to the day of your exam: vitamins, calcium tablets, antacids.  What should I wear: If possible, please wear clothes without metal (snaps, zippers). A sweat suit works well.  How long does the exam take: The exam takes about 20 minutes.  What should I bring: Bring a list of your current medicines to your exam (including vitamins, minerals and over-the-counter drugs).  Do I need a :  No  is needed.  What should I do after the exam: No restrictions,  You may resume normal activities.  How do I prepare for my exam? (Food and drink instructions) A DEXA scan is a bone-density scan. It uses a low level of radiation to check the strength of your bones. As you lie on a padded table, a machine will take X-rays. We most often scan the hips and lower spine.  Who should I call with questions: If you have any questions, please call the Imaging Department where you will have your exam. Directions, parking instructions, and other information is available on our website, [email or web address].            Nov 05, 2018  1:00 PM CST   (Arrive by 12:45 PM)   RETURN ENDOCRINE with Lizbet Byers MD   [facility] ([facility])    [address] [place] [address]   [phone]            Nov 12, 2018 10:30 AM CST   (Arrive by 10:15 AM)   Return Visit with Prakash Irene MD   [facility] ([facility])    [address] [place] [address]   [phone]            Nov 27, 2018  9:30 AM CST   (Arrive by 9:15 AM)   NUTRITION VISIT with Leyla Guerrero RD   [facility] ([facility])    [address] [place] [address]   [phone]            Dec 24, 2018  9:30 AM CST   (Arrive by 9:15 AM)   Return Bariatric Nutrition Visit with Leyla Guerrero RD   [facility] ([facility])    [address] [place] [address]   [phone]            Jan 14, 2019  1:00 PM CST   (Arrive by 12:45 PM)   RETURN FOOT/ANKLE with Javier Tuttle DPM   [facility] ([facility])    [address] [place] [address]   [phone]            Mar 18, 2019  2:05 PM CDT   (Arrive by 1:35 PM)   Return Kidney Transplant with  Kidney/Pancreas  Recipient 1   [facility] ([facility] [place])    [address] [facility]  [address]  [facility] [address] [phone]              Who to contact     Please call your clinic at [phone] to:    Ask questions about your health    Make or cancel appointments    Discuss your medicines    Learn about your test results    Speak to your doctor            Additional Information About Your Visit        [employer] Information     [employer] gives you secure access to your electronic health record. If you see a primary care provider, you can also send messages to your care team and make appointments. If you have questions, please call your primary care clinic.  If you do not have a primary care provider, please call [phone] and they will assist you.      [employer] is an electronic gateway that provides easy, online access to your medical records. With [employer], you can request a clinic appointment, read your test results, renew a prescription or communicate with your care team.     To access your existing account, please contact your [facility] or call [phone] for assistance.        Care EveryWhere ID     This is your Care EveryWhere ID. This could be used by other organizations to access your [facility] medical records  [id number]        Your Vitals Were     Pulse Temperature                78 99.3  F (37.4  C) (Temporal)           Blood Pressure from Last 3 Encounters:   10/01/18 123/76   09/18/18 123/81   09/13/18 130/83    Weight from Last 3 Encounters:   09/25/18 67.9 kg (149 lb 12.8 oz)   09/13/18 69.4 kg (153 lb)   08/28/18 68.5 kg (151 lb)              Today, you had the following     No orders found for display         Today's Medication Changes          These changes are accurate as of 9/18/18 11:59 PM.  If you have any questions, ask your nurse or doctor.               Start taking these medicines.        Dose/Directions    ARIPiprazole 2 MG tablet    Commonly known as:  ABILIFY   Used for:  Major depressive disorder, recurrent episode, moderate (H)   Started by:  Chaparrita Hatch MD        Dose:  2 mg   Take 1 tablet (2 mg) by mouth daily   Quantity:  30 tablet   Refills:  2         These medicines have changed or have updated prescriptions.        Dose/Directions    cyanocobalamin 1000 MCG tablet   Commonly known as:  vitamin  B-12   This may have changed:  when to take this   Used for:  CKD (chronic kidney disease) stage 5, GFR less than 15 ml/min (H)        Dose:  1000 mcg   Take 1 tablet by mouth daily.   Quantity:  30 tablet   Refills:  0            Where to get your medicines      These medications were sent to [facility][id number] - [facility] [address] [address]     Phone:  [phone]     ARIPiprazole 2 MG tablet                Primary Care Provider Office Phone # Fax #    Horacio Sheridan [phone]       [address] [place] [address] [address] [place] [address]        Equal Access to Services     [facility]UC[facility] AH: Hadii riky ku hadasho Soomaali, waaxda luqadaha, qaybta kaalmada adeegyada, waxay idiin haymicah agrawal . So [facility] [phone].    ATENCIÓN: Si habla español, tiene a goetz disposición servicios gratuitos de asistencia lingüística. [facility] [phone].    We comply with applicable federal civil rights laws and [place] laws. We do not discriminate on the basis of race, color, national origin, age, disability, sex, sexual orientation, or gender identity.            Thank you!     Thank you for choosing [facility]  for your care. Our goal is always to provide you with excellent care. Hearing back from our patients is one way we can continue to improve our services. Please take a few minutes to complete the written survey that you may receive in the mail after your visit with us. Thank you!             Your Updated Medication List - Protect others around  you: Learn how to safely use, store and throw away your medicines at www.disposemymeds.org.          This list is accurate as of 9/18/18 11:59 PM.  Always use your most recent med list.                   Brand Name Dispense Instructions for use Diagnosis    acetylcysteine 600 MG Caps capsule    N-ACETYL CYSTEINE    30 capsule    Take 2 400 mg caps two times daily for total daily dose of 800 mg        albuterol 108 (90 Base) MCG/ACT inhaler    PROAIR HFA/PROVENTIL HFA/VENTOLIN HFA    1 Inhaler    Inhale 2 puffs into the lungs every 6 hours as needed for shortness of breath / dyspnea or wheezing    Exercise-induced asthma       ARIPiprazole 2 MG tablet    ABILIFY    30 tablet    Take 1 tablet (2 mg) by mouth daily    Major depressive disorder, recurrent episode, moderate (H)       aspirin 81 MG EC tablet     30 tablet    Take 1 tablet (81 mg) by mouth daily    Kidney replaced by transplant       blood glucose monitoring lancets     1 Box    Use to test blood sugar 2 times daily or as directed.    Type 2 diabetes mellitus with peripheral neuropathy (H)       * blood glucose monitoring meter device kit    no brand specified    1 kit    Use to test blood sugar 2 times daily or as directed.    Type 2 diabetes mellitus with peripheral neuropathy (H)       * blood glucose monitoring meter device kit           blood glucose monitoring test strip    no brand specified    100 strip    Use to test blood sugars 2 times daily or as directed    Type 2 diabetes mellitus with peripheral neuropathy (H)       cyanocobalamin 1000 MCG tablet    vitamin  B-12    30 tablet    Take 1 tablet by mouth daily.    CKD (chronic kidney disease) stage 5, GFR less than 15 ml/min (H)       * cycloSPORINE modified 25 MG capsule    GENERIC EQUIVALENT    300 capsule    Take 5 capsules (125 mg) by mouth 2 times daily    Kidney replaced by transplant       * cycloSPORINE modified 25 MG capsule    GENERIC EQUIVALENT    300 capsule    TAKE 5 CAPSULES  (125MG) BY MOUTH TWO TIMES A DAY    Kidney replaced by transplant       econazole nitrate 1 % cream     85 g    Apply topically daily    Type II or unspecified type diabetes mellitus with neurological manifestations, not stated as uncontrolled(250.60) (H), Dermatophytosis of foot       fluticasone 50 MCG/ACT spray    FLONASE    1 Bottle    Spray 1 spray into both nostrils daily    Seasonal allergic rhinitis, unspecified chronicity, unspecified trigger       furosemide 20 MG tablet    LASIX    90 tablet    Take 1 tablet (20 mg) by mouth daily    Generalized edema       latanoprost 0.005 % ophthalmic solution    XALATAN    7.5 mL    Place 1 drop into both eyes At Bedtime    Mild stage glaucoma(365.71)       metFORMIN 500 MG 24 hr tablet    GLUCOPHAGE-XR    90 tablet    Take 3 tablets (1,500 mg) by mouth daily (with dinner)    Type 2 diabetes mellitus with diabetic polyneuropathy, without long-term current use of insulin (H)       mycophenolate 250 MG capsule    GENERIC EQUIVALENT    240 capsule    Take 4 capsules (1,000 mg) by mouth 2 times daily    Kidney transplanted       omeprazole 40 MG capsule    priLOSEC    90 capsule    Take 1 capsule (40 mg) by mouth daily    Gastroesophageal reflux disease without esophagitis       ondansetron 4 MG ODT tab    ZOFRAN-ODT    20 tablet    Take 1 tablet (4 mg) by mouth every 6 hours as needed    Chronic kidney disease, stage V (H)       order for DME     1 Units    Walker with front wheels and a seat.    Fall, initial encounter       prazosin 5 MG capsule    MINIPRESS    90 capsule    Take 3 capsules (15 mg) by mouth At Bedtime    Nightmares associated with chronic post-traumatic stress disorder       REFRESH OP      Apply to eye as needed Both eyes        replens Gel     35 g    Use vaginally as needed. Can use up to 3 times per week.    Vaginal dryness       simvastatin 20 MG tablet    ZOCOR    90 tablet    Take 1 tablet (20 mg) by mouth At Bedtime    Chronic kidney disease,  stage V (H)       sulfamethoxazole-trimethoprim 400-80 MG per tablet    BACTRIM/SEPTRA    90 tablet    Take 1 tablet by mouth daily    Immunosuppression (H)       topiramate 200 MG tablet    TOPAMAX    60 tablet    Take 1 tablet (200 mg) by mouth 2 times daily    Morbid obesity due to excess calories (H)       TYLENOL 325 MG tablet   Generic drug:  acetaminophen      Take 325-650 mg by mouth as needed        vilazodone 40 MG Tabs tablet    VIIBRYD    30 tablet    Take 1 tablet (40 mg) by mouth daily    Major depressive disorder, recurrent episode, moderate (H)       vitamin D 1000 units capsule     30 capsule    2,000 Units        * Notice:  This list has 4 medication(s) that are the same as other medications prescribed for you. Read the directions carefully, and ask your doctor or other care provider to review them with you.
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
## 2018-09-20 ASSESSMENT — PATIENT HEALTH QUESTIONNAIRE - PHQ9: SUM OF ALL RESPONSES TO PHQ QUESTIONS 1-9: 4

## 2018-09-25 ENCOUNTER — TELEPHONE (OUTPATIENT)
Dept: TRANSPLANT | Facility: CLINIC | Age: 63
End: 2018-09-25

## 2018-09-25 ENCOUNTER — OFFICE VISIT (OUTPATIENT)
Dept: SURGERY | Facility: CLINIC | Age: 63
End: 2018-09-25
Payer: MEDICARE

## 2018-09-25 VITALS — WEIGHT: 149.8 LBS | BODY MASS INDEX: 28.3 KG/M2

## 2018-09-25 DIAGNOSIS — Z48.298 AFTERCARE FOLLOWING ORGAN TRANSPLANT: ICD-10-CM

## 2018-09-25 DIAGNOSIS — Z79.899 ENCOUNTER FOR LONG-TERM CURRENT USE OF MEDICATION: ICD-10-CM

## 2018-09-25 DIAGNOSIS — Z94.0 KIDNEY REPLACED BY TRANSPLANT: Primary | ICD-10-CM

## 2018-09-25 DIAGNOSIS — Z94.0 KIDNEY REPLACED BY TRANSPLANT: ICD-10-CM

## 2018-09-25 LAB
ANION GAP SERPL CALCULATED.3IONS-SCNC: 8 MMOL/L (ref 3–14)
BUN SERPL-MCNC: 12 MG/DL (ref 7–30)
CALCIUM SERPL-MCNC: 9 MG/DL (ref 8.5–10.1)
CHLORIDE SERPL-SCNC: 108 MMOL/L (ref 94–109)
CO2 SERPL-SCNC: 22 MMOL/L (ref 20–32)
CREAT SERPL-MCNC: 0.96 MG/DL (ref 0.52–1.04)
CYCLOSPORINE BLD LC/MS/MS-MCNC: 122 UG/L (ref 50–400)
ERYTHROCYTE [DISTWIDTH] IN BLOOD BY AUTOMATED COUNT: 15.5 % (ref 10–15)
GFR SERPL CREATININE-BSD FRML MDRD: 59 ML/MIN/1.7M2
GLUCOSE SERPL-MCNC: 155 MG/DL (ref 70–99)
HCT VFR BLD AUTO: 37.3 % (ref 35–47)
HGB BLD-MCNC: 11.4 G/DL (ref 11.7–15.7)
MCH RBC QN AUTO: 25.3 PG (ref 26.5–33)
MCHC RBC AUTO-ENTMCNC: 30.6 G/DL (ref 31.5–36.5)
MCV RBC AUTO: 83 FL (ref 78–100)
PLATELET # BLD AUTO: 204 10E9/L (ref 150–450)
POTASSIUM SERPL-SCNC: 3.8 MMOL/L (ref 3.4–5.3)
RBC # BLD AUTO: 4.5 10E12/L (ref 3.8–5.2)
SODIUM SERPL-SCNC: 138 MMOL/L (ref 133–144)
TME LAST DOSE: NORMAL H
WBC # BLD AUTO: 4.4 10E9/L (ref 4–11)

## 2018-09-25 RX ORDER — ARIPIPRAZOLE 2 MG/1
2 TABLET ORAL DAILY
Qty: 30 TABLET | Refills: 2 | Status: SHIPPED | OUTPATIENT
Start: 2018-09-25 | End: 2018-09-25

## 2018-09-25 RX ORDER — ARIPIPRAZOLE 2 MG/1
2 TABLET ORAL DAILY
Qty: 30 TABLET | Refills: 2 | Status: SHIPPED | OUTPATIENT
Start: 2018-09-25 | End: 2018-11-06

## 2018-09-25 NOTE — TELEPHONE ENCOUNTER
ISSUE:  CSA level 122  Goal     PLAN:   Please call pt to ensure good trough and confirm current CSA dose 125 mg BID.  Any recent illnesses or medication changes?  No dose change at this time d/t prior levels at goal.  Pt to repeat level in 1-2 weeks.   Please place order.

## 2018-09-25 NOTE — LETTER
"9/25/2018       RE: Elizabeth Palma  [address]  [place] [id number]     Dear Colleague,    Thank you for referring your patient, Elizabeth Palma, to the [facility] at [facility]. Please see a copy of my visit note below.    Nutrition Reassessment  Reason For Visit:  Elizabeth Palma is a 63 year-old female with type 2 DM (oral med management) presenting today for nutrition follow-up, s/p \"gastric bypass in 1990 at [facility]\" per Pt report.  She is seeing medical weight management.  She was referred by Dr. Irene.  Note: Pt had a kidney transplant on March 20, 2014.    Pt was accompanied by her .     Anthropometrics:  Height: 61\"  Pre-op Weight: 265 lbs per Pt report; lowest weight after bariatric surgery: 165-170 lbs (25 years ago)  (Pt reported that she initially was at 215 lbs in 2015 prior to seeing writer.)    Current Weight: 149.8 lbs (-1.2 lbs over the past month) with BMI of 28.30.    Current Vitamins/Minerals: 3000 International Units vitamin D/day, Calcium, vitamin C, vitamin B12 daily, B-complex  *Pt stated that she was told not to take other vitamins/minerals by MD.    Nutrition History:  Lactose intolerance ever since bariatric surgery.  Pt stated that she has osteoporosis; however, she stated that her doctors don't want her to take any vitamins or minerals due to her kidney transplant.    Diet/Nutrition Intake Hx: Pt reports her intake varies due to fluctuating appetite. Per pt there are days when she eats 3 meals but on other days she may not eat much at all or nibble on something through out the day. Pt also states her intake is affected by nausea 2/2 her anti-rejection medications. However pt reports she tries to make healthy choices (lower in calorie). Pt is also limited in protein choices that she likes - pt reports she does not take much dairy, does not like beans and lentils, keeps kosher. Advised pt to " try to time her meals and eat every 4 hours instead of grazing but pt refused stating she would rather not eat at all.     *Pt stated that she will not record food intake due to the fact that every time she does this, she simply does not eat as she doesn't want to record.  She stated that her therapist strongly advises against recording food intake for this reason.    Physical Activity Note: Pt reports she has a tendency to break bones so she is limited with what exercises she does. Pt states there is a long hallway in the building that she needs to walk when going out. Pt states she does not walk for exercises but walks only to get ADLs done.      Progress with Previous Goals:    1. Avoid grazing through, Eat 3 meals with lean protein at each meal, along with a non-starchy vegetable or whole fruit, up to 1/2 c carb at a meal. Continues to try and get more protein during the day but continues to have a variable appetite     -Try hard-boiled eggs to put on your salad or to have later in the day to make up for skipping breakfast.  Continues to have hard boiled egg weekly  2. If needing a snack, have vegetables (give at least 2 hours between a meals and a snack). Will snack on apples or vegetables  3. Walk 10 min daily, increasing as able.  Continues to walk the hallways regularly to get to the car and her classroom    Recent food recall:  Chicken or protein bars, bites of meat with bite of potato or eggs    Nutrition Prescription:  Grams Protein: 60 (minimum) - Not meeting consistently.   Amount of Fluid: 48-64 oz    Nutrition Diagnosis  Previous: Overweight related to history of excessive energy intake as evidenced by BMI > 25. - continues    Intervention  Intervention At Appointment:  Materials/Education provided:  Discussed importance of optimizing protein intake and options available (pharmaceutical-grade supplementation available from clinic).  Encouraged patient to try and include more eggs throughout the week  and continue to consume regular protein sources.  Reviewed previous goals.  Gave encouragement and support.    *Note: Pt purchased the following protein bars, hot base, and protein shake from clinic today.    Patient Understanding: good  Expected Compliance: good with continued RD follow up.    Goals:   1. Avoid grazing through, Eat 3 meals with lean protein at each meal, along with a non-starchy vegetable or whole fruit, up to 1/2 c carb at a meal.   -Try hard-boiled eggs to put on your salad or to have later in the day to make up for skipping breakfast.    2. If needing a snack, have vegetables (give at least 2 hours between a meals and a snack).  3. Walk 10 min daily, increasing as able.    Follow-Up: 1 month    Time spent with patient: 30 minutes.    Again, thank you for allowing me to participate in the care of your patient.      Sincerely,    Leyla Guerrero RD

## 2018-09-25 NOTE — TELEPHONE ENCOUNTER
Call placed to patient. Patient confirms current dose and accurate trough level. Patient deny any medication changed and note that she has an appointment to see her PCP r\t increased weight loss, urinary frequency and incontinence. Patient v\u to repeat level in 1-2 weeks.

## 2018-09-25 NOTE — PROGRESS NOTES
"Nutrition Reassessment  Reason For Visit:  Elizabeth Palma is a 63 year-old female with type 2 DM (oral med management) presenting today for nutrition follow-up, s/p \"gastric bypass in 1990 at [facility]\" per Pt report.  She is seeing medical weight management.  She was referred by Dr. Irene.  Note: Pt had a kidney transplant on March 20, 2014.    Pt was accompanied by her .     Anthropometrics:  Height: 61\"  Pre-op Weight: 265 lbs per Pt report; lowest weight after bariatric surgery: 165-170 lbs (25 years ago)  (Pt reported that she initially was at 215 lbs in 2015 prior to seeing writer.)    Current Weight: 149.8 lbs (-1.2 lbs over the past month) with BMI of 28.30.    Current Vitamins/Minerals: 3000 International Units vitamin D/day, Calcium, vitamin C, vitamin B12 daily, B-complex  *Pt stated that she was told not to take other vitamins/minerals by MD.    Nutrition History:  Lactose intolerance ever since bariatric surgery.  Pt stated that she has osteoporosis; however, she stated that her doctors don't want her to take any vitamins or minerals due to her kidney transplant.    Diet/Nutrition Intake Hx: Pt reports her intake varies due to fluctuating appetite. Per pt there are days when she eats 3 meals but on other days she may not eat much at all or nibble on something through out the day. Pt also states her intake is affected by nausea 2/2 her anti-rejection medications. However pt reports she tries to make healthy choices (lower in calorie). Pt is also limited in protein choices that she likes - pt reports she does not take much dairy, does not like beans and lentils, keeps kosher. Advised pt to try to time her meals and eat every 4 hours instead of grazing but pt refused stating she would rather not eat at all.     *Pt stated that she will not record food intake due to the fact that every time she does this, she simply does not eat as she doesn't want to record.  She stated that her therapist " strongly advises against recording food intake for this reason.    Physical Activity Note: Pt reports she has a tendency to break bones so she is limited with what exercises she does. Pt states there is a long hallway in the building that she needs to walk when going out. Pt states she does not walk for exercises but walks only to get ADLs done.      Progress with Previous Goals:    1. Avoid grazing through, Eat 3 meals with lean protein at each meal, along with a non-starchy vegetable or whole fruit, up to 1/2 c carb at a meal. Continues to try and get more protein during the day but continues to have a variable appetite     -Try hard-boiled eggs to put on your salad or to have later in the day to make up for skipping breakfast.  Continues to have hard boiled egg weekly  2. If needing a snack, have vegetables (give at least 2 hours between a meals and a snack). Will snack on apples or vegetables  3. Walk 10 min daily, increasing as able.  Continues to walk the hallways regularly to get to the car and her classroom    Recent food recall:  Chicken or protein bars, bites of meat with bite of potato or eggs    Nutrition Prescription:  Grams Protein: 60 (minimum) - Not meeting consistently.   Amount of Fluid: 48-64 oz    Nutrition Diagnosis  Previous: Overweight related to history of excessive energy intake as evidenced by BMI > 25. - continues    Intervention  Intervention At Appointment:  Materials/Education provided:  Discussed importance of optimizing protein intake and options available (pharmaceutical-grade supplementation available from clinic).  Encouraged patient to try and include more eggs throughout the week and continue to consume regular protein sources.  Reviewed previous goals.  Gave encouragement and support.    *Note: Pt purchased the following protein bars, hot base, and protein shake from clinic today.    Patient Understanding: good  Expected Compliance: good with continued RD follow up.    Goals:    1. Avoid grazing through, Eat 3 meals with lean protein at each meal, along with a non-starchy vegetable or whole fruit, up to 1/2 c carb at a meal.   -Try hard-boiled eggs to put on your salad or to have later in the day to make up for skipping breakfast.    2. If needing a snack, have vegetables (give at least 2 hours between a meals and a snack).  3. Walk 10 min daily, increasing as able.    Follow-Up: 1 month    Time spent with patient: 30 minutes.  Leyla Guerrero RD, LD

## 2018-10-01 ENCOUNTER — OFFICE VISIT (OUTPATIENT)
Dept: PODIATRY | Facility: CLINIC | Age: 63
End: 2018-10-01
Payer: COMMERCIAL

## 2018-10-01 VITALS — SYSTOLIC BLOOD PRESSURE: 123 MMHG | HEART RATE: 65 BPM | DIASTOLIC BLOOD PRESSURE: 76 MMHG | OXYGEN SATURATION: 99 %

## 2018-10-01 DIAGNOSIS — E11.49 TYPE II OR UNSPECIFIED TYPE DIABETES MELLITUS WITH NEUROLOGICAL MANIFESTATIONS, NOT STATED AS UNCONTROLLED(250.60) (H): ICD-10-CM

## 2018-10-01 DIAGNOSIS — L60.2 ONYCHAUXIS: Primary | ICD-10-CM

## 2018-10-01 PROCEDURE — 99213 OFFICE O/P EST LOW 20 MIN: CPT | Performed by: PODIATRIST

## 2018-10-01 ASSESSMENT — PAIN SCALES - GENERAL: PAINLEVEL: NO PAIN (1)

## 2018-10-01 NOTE — LETTER
10/1/2018         RE: Elizabeth Palma  [address]  [facility] [address]        Dear Colleague,    Thank you for referring your patient, Elizabeth Palma, to the [facility]. Please see a copy of my visit note below.    Past Medical History:   Diagnosis Date     Abnormal MRI, cervical spine 10/15/2011    2011; mild changes noted. Study done for left arm symptoms Impression:  1. Mild multilevel degenerative disc disease with no significant canal or neural stenosis seen. motion artifact on the STIR images in these are not interpretable. The remaining images were interpreted      Autosomal dominant polycystic kidney disease 2011     (Problem list name updated by automated process. Provider to review and confirm.)     CMC DJD(carpometacarpal degenerative joint disease), localized primary 3/5/2013     -donor kidney transplant 3/20/2014     Depressive disorder 11/15/2012     DM type 2 (diabetes mellitus, type 2) (H) 2013     Encounter for long-term (current) use of other medications 2015     Family history of tremor 10/17/2011     Generalized anxiety disorder 11/15/2012     Glaucoma      Hyperlipidemia 10/15/2011     Hyperparathyroidism, secondary (H) 2015     Hypertension     resolved     Immunosuppressed status (H) 3/20/2014     Major depressive disorder, recurrent episode, moderate (H) 11/15/2012     Obesity (BMI 30-39.9)      OP (osteoporosis) T score -3.8 2009 T-score -3.7      LION (obstructive sleep apnoea) 10/15/2012    intol to cpa     Pain in joint, forearm -- L unhealed Fx 2013     Premature menopause age 37 7/10/2012    OCP (vaginal bldg)-->HT which she stopped 2 mo later documented at 2007 visit (age 51).      Restless leg syndrome      Rib fractures 2013     Sensory loss 10/17/2011    Bottom of feet; uncertain if there is a neuropathy per notes.       Stiffness of joint, not elsewhere classified, hand 3/5/2013      Tremor 10/15/2011    head     Uncomplicated asthma      Patient Active Problem List   Diagnosis     Autosomal dominant polycystic kidney disease     Hypertension     Hyperlipidemia     Abnormal MRI, cervical spine     Sensory loss     Premature menopause age 37     OP (osteoporosis) T score -3.8     LION on CPAP     Major depressive disorder, recurrent episode, moderate (H)     Generalized anxiety disorder     CMC DJD(carpometacarpal degenerative joint disease), localized primary     Pain in joint, forearm -- L unhealed Fx     -donor kidney transplant     Immunosuppressed status (H)     Hyperparathyroidism, secondary (H)     Hyperparathyroidism (H)     Senile osteoporosis     Pain in joint involving ankle and foot     Nightmares associated with chronic post-traumatic stress disorder     Type 2 diabetes mellitus with peripheral neuropathy (H)     Posttraumatic stress disorder     Right knee pain     Left knee pain     Age-related osteoporosis without current pathological fracture     Narcissistic personality disorder     Past Surgical History:   Procedure Laterality Date     ABDOMEN SURGERY       ANKLE SURGERY       C TRANSPLANTATION OF KIDNEY  3/2014     C/SECTION, LOW TRANSVERSE      x 2     CHOLECYSTECTOMY       COLONOSCOPY       ESOPHAGOSCOPY, GASTROSCOPY, DUODENOSCOPY (EGD), COMBINED N/A 2015    Procedure: COMBINED ESOPHAGOSCOPY, GASTROSCOPY, DUODENOSCOPY (EGD);  Surgeon: Sky Davey MD;  Location:  GI     ESOPHAGOSCOPY, GASTROSCOPY, DUODENOSCOPY (EGD), COMBINED N/A 2015    Procedure: COMBINED ESOPHAGOSCOPY, GASTROSCOPY, DUODENOSCOPY (EGD), BIOPSY SINGLE OR MULTIPLE;  Surgeon: Sky Davey MD;  Location:  GI     EYE SURGERY       LAPAROSCOPY, SURGICAL; REPAIR INCISIONAL OR VENTRAL HERNIA       ORTHOPEDIC SURGERY       HERMINIA EN Y BOWEL       WRIST SURGERY       Social History     Social History     Marital status:      Spouse name: Rahat     Danie tovar  children: 2     Years of education: N/A     Occupational History           part-time     Social History Main Topics     Smoking status: Former Smoker     Smokeless tobacco: Never Used     Alcohol use No     Drug use: No     Sexual activity: Yes     Partners: Male     Birth control/ protection: None      Comment: 1 partner     Other Topics Concern     Not on file     Social History Narrative     Family History   Problem Relation Age of Onset     Mental Illness Other      family hx     HEART DISEASE Other      Diabetes Other      Cancer Other      Genetic Disorder Father      Hyperlipidemia Mother      Glaucoma No family hx of      Macular Degeneration No family hx of      Hypertension No family hx of      Lab Results   Component Value Date    A1C 6.4 01/19/2018    A1C 6.5 05/19/2017    A1C 6.2 04/13/2017    A1C 6.5 05/09/2016    A1C 6.2 06/02/2015     Lab Results   Component Value Date    WBC 4.4 09/25/2018     Lab Results   Component Value Date    RBC 4.50 09/25/2018     Lab Results   Component Value Date    HGB 11.4 09/25/2018     Lab Results   Component Value Date    HCT 37.3 09/25/2018     No components found for: MCT  Lab Results   Component Value Date    MCV 83 09/25/2018     Lab Results   Component Value Date    MCH 25.3 09/25/2018     Lab Results   Component Value Date    MCHC 30.6 09/25/2018     Lab Results   Component Value Date    RDW 15.5 09/25/2018     Lab Results   Component Value Date     09/25/2018     Last Comprehensive Metabolic Panel:  Sodium   Date Value Ref Range Status   09/25/2018 138 133 - 144 mmol/L Final     Potassium   Date Value Ref Range Status   09/25/2018 3.8 3.4 - 5.3 mmol/L Final     Chloride   Date Value Ref Range Status   09/25/2018 108 94 - 109 mmol/L Final     Carbon Dioxide   Date Value Ref Range Status   09/25/2018 22 20 - 32 mmol/L Final     Anion Gap   Date Value Ref Range Status   09/25/2018 8 3 - 14 mmol/L Final     Glucose   Date Value Ref Range Status   09/25/2018  155 (H) 70 - 99 mg/dL Final     Urea Nitrogen   Date Value Ref Range Status   09/25/2018 12 7 - 30 mg/dL Final     Creatinine   Date Value Ref Range Status   09/25/2018 0.96 0.52 - 1.04 mg/dL Final     GFR Estimate   Date Value Ref Range Status   09/25/2018 59 (L) >60 mL/min/1.7m2 Final     Comment:     Non  GFR Calc     Calcium   Date Value Ref Range Status   09/25/2018 9.0 8.5 - 10.1 mg/dL Final     SUBJECTIVE FINDINGS:  A 63-year-old female returns to clinic for onychauxis and diabetic foot check.  She is diabetic with peripheral neuropathy.  Relates numbness and tingling is not bad in her feet.  No ulcers or sores since I have seen her last.  She relates she is wearing her diabetic shoes.  Relates she is seeing a dietician and losing weight and she has pain in right 2-3 toes, no injury.      OBJECTIVE FINDINGS:  DP and PT are 2/4 bilaterally.  CFTs are less than 3 seconds bilaterally.  She has incurvated nails with some dystrophy and subungual debris bilaterally 1-5.  She has dorsally contracted digits 1-5 bilaterally.  She has decreased ankle joint dorsiflexion bilaterally.  There is no erythema, no drainage, no odor, no calor bilaterally.  There is some dystrophy of the nails as well.   She has pain on palpation dorsal right 2-3 toes.      ASSESSMENT AND PLAN:  Onychauxis bilaterally.  She is diabetic with peripheral neuropathy.  Diagnosis and treatment options were discussed with the patient.  All of the nails were reduced bilaterally upon consent.  She was advised on stretching and shoegear.  Return to clinic and see me in about 3 months.    Again, thank you for allowing me to participate in the care of your patient.        Sincerely,        Javier Tuttle DPM

## 2018-10-01 NOTE — PROGRESS NOTES
Past Medical History:   Diagnosis Date     Abnormal MRI, cervical spine 10/15/2011    2011; mild changes noted. Study done for left arm symptoms Impression:  1. Mild multilevel degenerative disc disease with no significant canal or neural stenosis seen. motion artifact on the STIR images in these are not interpretable. The remaining images were interpreted      Autosomal dominant polycystic kidney disease 2011     (Problem list name updated by automated process. Provider to review and confirm.)     CMC DJD(carpometacarpal degenerative joint disease), localized primary 3/5/2013     -donor kidney transplant 3/20/2014     Depressive disorder 11/15/2012     DM type 2 (diabetes mellitus, type 2) (H) 2013     Encounter for long-term (current) use of other medications 2015     Family history of tremor 10/17/2011     Generalized anxiety disorder 11/15/2012     Glaucoma      Hyperlipidemia 10/15/2011     Hyperparathyroidism, secondary (H) 2015     Hypertension     resolved     Immunosuppressed status (H) 3/20/2014     Major depressive disorder, recurrent episode, moderate (H) 11/15/2012     Obesity (BMI 30-39.9)      OP (osteoporosis) T score -3.8 2009 T-score -3.7      LION (obstructive sleep apnoea) 10/15/2012    intol to cpa     Pain in joint, forearm -- L unhealed Fx 2013     Premature menopause age 37 7/10/2012    OCP (vaginal bldg)-->HT which she stopped 2 mo later documented at 2007 visit (age 51).      Restless leg syndrome      Rib fractures 2013     Sensory loss 10/17/2011    Bottom of feet; uncertain if there is a neuropathy per notes.       Stiffness of joint, not elsewhere classified, hand 3/5/2013     Tremor 10/15/2011    head     Uncomplicated asthma      Patient Active Problem List   Diagnosis     Autosomal dominant polycystic kidney disease     Hypertension     Hyperlipidemia     Abnormal MRI, cervical spine     Sensory loss     Premature menopause  age 37     OP (osteoporosis) T score -3.8     LION on CPAP     Major depressive disorder, recurrent episode, moderate (H)     Generalized anxiety disorder     CMC DJD(carpometacarpal degenerative joint disease), localized primary     Pain in joint, forearm -- L unhealed Fx     -donor kidney transplant     Immunosuppressed status (H)     Hyperparathyroidism, secondary (H)     Hyperparathyroidism (H)     Senile osteoporosis     Pain in joint involving ankle and foot     Nightmares associated with chronic post-traumatic stress disorder     Type 2 diabetes mellitus with peripheral neuropathy (H)     Posttraumatic stress disorder     Right knee pain     Left knee pain     Age-related osteoporosis without current pathological fracture     Narcissistic personality disorder     Past Surgical History:   Procedure Laterality Date     ABDOMEN SURGERY       ANKLE SURGERY       C TRANSPLANTATION OF KIDNEY  3/2014     C/SECTION, LOW TRANSVERSE      x 2     CHOLECYSTECTOMY       COLONOSCOPY       ESOPHAGOSCOPY, GASTROSCOPY, DUODENOSCOPY (EGD), COMBINED N/A 2015    Procedure: COMBINED ESOPHAGOSCOPY, GASTROSCOPY, DUODENOSCOPY (EGD);  Surgeon: Sky Davey MD;  Location:  GI     ESOPHAGOSCOPY, GASTROSCOPY, DUODENOSCOPY (EGD), COMBINED N/A 2015    Procedure: COMBINED ESOPHAGOSCOPY, GASTROSCOPY, DUODENOSCOPY (EGD), BIOPSY SINGLE OR MULTIPLE;  Surgeon: Sky Davey MD;  Location:  GI     EYE SURGERY       LAPAROSCOPY, SURGICAL; REPAIR INCISIONAL OR VENTRAL HERNIA       ORTHOPEDIC SURGERY       HERMINIA EN Y BOWEL       WRIST SURGERY       Social History     Social History     Marital status:      Spouse name: Rahat     Number of children: 2     Years of education: N/A     Occupational History           part-time     Social History Main Topics     Smoking status: Former Smoker     Smokeless tobacco: Never Used     Alcohol use No     Drug use: No     Sexual activity: Yes     Partners: Male      Birth control/ protection: None      Comment: 1 partner     Other Topics Concern     Not on file     Social History Narrative     Family History   Problem Relation Age of Onset     Mental Illness Other      family hx     HEART DISEASE Other      Diabetes Other      Cancer Other      Genetic Disorder Father      Hyperlipidemia Mother      Glaucoma No family hx of      Macular Degeneration No family hx of      Hypertension No family hx of      Lab Results   Component Value Date    A1C 6.4 01/19/2018    A1C 6.5 05/19/2017    A1C 6.2 04/13/2017    A1C 6.5 05/09/2016    A1C 6.2 06/02/2015     Lab Results   Component Value Date    WBC 4.4 09/25/2018     Lab Results   Component Value Date    RBC 4.50 09/25/2018     Lab Results   Component Value Date    HGB 11.4 09/25/2018     Lab Results   Component Value Date    HCT 37.3 09/25/2018     No components found for: MCT  Lab Results   Component Value Date    MCV 83 09/25/2018     Lab Results   Component Value Date    MCH 25.3 09/25/2018     Lab Results   Component Value Date    MCHC 30.6 09/25/2018     Lab Results   Component Value Date    RDW 15.5 09/25/2018     Lab Results   Component Value Date     09/25/2018     Last Comprehensive Metabolic Panel:  Sodium   Date Value Ref Range Status   09/25/2018 138 133 - 144 mmol/L Final     Potassium   Date Value Ref Range Status   09/25/2018 3.8 3.4 - 5.3 mmol/L Final     Chloride   Date Value Ref Range Status   09/25/2018 108 94 - 109 mmol/L Final     Carbon Dioxide   Date Value Ref Range Status   09/25/2018 22 20 - 32 mmol/L Final     Anion Gap   Date Value Ref Range Status   09/25/2018 8 3 - 14 mmol/L Final     Glucose   Date Value Ref Range Status   09/25/2018 155 (H) 70 - 99 mg/dL Final     Urea Nitrogen   Date Value Ref Range Status   09/25/2018 12 7 - 30 mg/dL Final     Creatinine   Date Value Ref Range Status   09/25/2018 0.96 0.52 - 1.04 mg/dL Final     GFR Estimate   Date Value Ref Range Status   09/25/2018 59 (L)  >60 mL/min/1.7m2 Final     Comment:     Non  GFR Calc     Calcium   Date Value Ref Range Status   09/25/2018 9.0 8.5 - 10.1 mg/dL Final     SUBJECTIVE FINDINGS:  A 63-year-old female returns to clinic for onychauxis and diabetic foot check.  She is diabetic with peripheral neuropathy.  Relates numbness and tingling is not bad in her feet.  No ulcers or sores since I have seen her last.  She relates she is wearing her diabetic shoes.  Relates she is seeing a dietician and losing weight and she has pain in right 2-3 toes, no injury.      OBJECTIVE FINDINGS:  DP and PT are 2/4 bilaterally.  CFTs are less than 3 seconds bilaterally.  She has incurvated nails with some dystrophy and subungual debris bilaterally 1-5.  She has dorsally contracted digits 1-5 bilaterally.  She has decreased ankle joint dorsiflexion bilaterally.  There is no erythema, no drainage, no odor, no calor bilaterally.  There is some dystrophy of the nails as well.   She has pain on palpation dorsal right 2-3 toes.      ASSESSMENT AND PLAN:  Onychauxis bilaterally.  She is diabetic with peripheral neuropathy.  Diagnosis and treatment options were discussed with the patient.  All of the nails were reduced bilaterally upon consent.  She was advised on stretching and shoegear.  Return to clinic and see me in about 3 months.

## 2018-10-01 NOTE — MR AVS SNAPSHOT
After Visit Summary   10/1/2018    Elizabeth Palma    MRN: [id number]           Patient Information     Date Of Birth          1955        Visit Information        Provider Department      10/1/2018 12:30 PM Javier Tuttle DPM [facility]        Today's Diagnoses     Onychauxis    -  1    Type II or unspecified type diabetes mellitus with neurological manifestations, not stated as uncontrolled(250.60) (H)          Care Instructions    Thanks for coming today.  [facility]  [address] [place] [address]    To schedule future appointments in Ortho Clinic, you may call [phone].    To schedule ordered imaging by your provider:   Call Central Imaging [phone]    To schedule an injection ordered by your provider:  Call Central Imaging Injection scheduling line: [phone]  Eachbabyhart available online at:  [email or web address]    Please call if any further questions or concerns ([phone]).  Clinic hours 8 am to 5 pm.    Return to clinic (call) if symptoms worsen or fail to improve.            Follow-ups after your visit        Your next 10 appointments already scheduled     Oct 16, 2018 10:30 AM CDT   (Arrive by 10:15 AM)   Return Visit with Horacio Sheridan MD   [facility] ([facility])    [address] [place] [id number] [phone]            Oct 30, 2018  9:30 AM CDT   (Arrive by 9:15 AM)   Return Bariatric Nutrition Visit with Carrie Chawla RD   [facility] ([facility])    [address] [place] [address] [id number] [phone]            2018 12:00 PM CST   DX HIP/PELVIS/SPINE with UCDX1   [facility] ([facility])    [address] [place] [id number] [phone]           How do I prepare for my exam? (Food  and drink instructions) No Food and Drink Restrictions.  How do I prepare for my exam? (Other instructions) Please do not take any of the following 24 hours prior to the day of your exam: vitamins, calcium tablets, antacids.  What should I wear: If possible, please wear clothes without metal (snaps, zippers). A sweat suit works well.  How long does the exam take: The exam takes about 20 minutes.  What should I bring: Bring a list of your current medicines to your exam (including vitamins, minerals and over-the-counter drugs).  Do I need a :  No  is needed.  What should I do after the exam: No restrictions, You may resume normal activities.  How do I prepare for my exam? (Food and drink instructions) A DEXA scan is a bone-density scan. It uses a low level of radiation to check the strength of your bones. As you lie on a padded table, a machine will take X-rays. We most often scan the hips and lower spine.  Who should I call with questions: If you have any questions, please call the Imaging Department where you will have your exam. Directions, parking instructions, and other information is available on our website, [email or web address].            Nov 05, 2018  1:00 PM CST   (Arrive by 12:45 PM)   RETURN ENDOCRINE with Lizbet Byers MD   [facility] ([facility])    [address] [place] [id number]   [phone]            Nov 12, 2018 10:30 AM CST   (Arrive by 10:15 AM)   Return Visit with Prakash Irene MD   [facility] ([facility])    [address] [place] [phone]   [phone]            Nov 27, 2018  9:30 AM CST   (Arrive by 9:15 AM)   NUTRITION VISIT with Leyla Guerrero RD   [facility] ([facility])    [address] [place] [phone]   [phone]             Dec 24, 2018  9:30 AM CST   (Arrive by 9:15 AM)   Return Bariatric Nutrition Visit with Leyla Guerrero RD   [facility] ([facility])    [address]  [address]  [facility] [phone] [phone]            Mar 18, 2019  2:05 PM CDT   (Arrive by 1:35 PM)   Return Kidney Transplant with  Kidney/Pancreas Recipient 1   [facility] ([facility])    [address]  [address]  [facility] [phone] [phone]              Who to contact     If you have questions or need follow up information about today's clinic visit or your schedule please contact [facility] directly at [phone].  Normal or non-critical lab and imaging results will be communicated to you by [employer], letter or phone within 4 business days after the clinic has received the results. If you do not hear from us within 7 days, please contact the clinic through [employer] or phone. If you have a critical or abnormal lab result, we will notify you by phone as soon as possible.  Submit refill requests through [employer] or call your pharmacy and they will forward the refill request to us. Please allow 3 business days for your refill to be completed.          Additional Information About Your Visit        [employer] Information     [employer] gives you secure access to your electronic health record. If you see a primary care provider, you can also send messages to your care team and make appointments. If you have questions, please call your primary care clinic.  If you do not have a primary care provider, please call [phone] and they will assist you.      [employer] is an electronic gateway that provides easy, online access to your medical records. With [employer], you can request a clinic appointment, read your test results, renew a prescription or communicate with your care team.     To access your existing account, please contact your  [facility] or call [phone] for assistance.        Care EveryWhere ID     This is your Care EveryWhere ID. This could be used by other organizations to access your [facility] medical records  [id number]        Your Vitals Were     Pulse Pulse Oximetry                65 99%           Blood Pressure from Last 3 Encounters:   10/01/18 123/76   09/18/18 123/81   09/13/18 130/83    Weight from Last 3 Encounters:   09/25/18 67.9 kg (149 lb 12.8 oz)   09/13/18 69.4 kg (153 lb)   08/28/18 68.5 kg (151 lb)              We Performed the Following     TRIM NONDYSTRPHIC NAIL(S)          Today's Medication Changes          These changes are accurate as of 10/1/18  1:11 PM.  If you have any questions, ask your nurse or doctor.               These medicines have changed or have updated prescriptions.        Dose/Directions    cyanocobalamin 1000 MCG tablet   Commonly known as:  vitamin  B-12   This may have changed:  when to take this   Used for:  CKD (chronic kidney disease) stage 5, GFR less than 15 ml/min (H)        Dose:  1000 mcg   Take 1 tablet by mouth daily.   Quantity:  30 tablet   Refills:  0                Primary Care Provider Office Phone # Fax #    Horacio Sheridan [phone]       [address] [place] [address] [address] [place] [address]        Equal Access to Services     LINNEA HIDALGO AH: Jennifer lehman hadasho Soomaali, waaxda luqadaha, qaybta kaalmada adeelizabeth, ty fam. So [facility] [phone].    ATENCIÓN: Si habla español, tiene a goetz disposición servicios gratuitos de asistencia lingüística. Llame al [phone].    We comply with applicable federal civil rights laws and [place] laws. We do not discriminate on the basis of race, color, national origin, age, disability, sex, sexual orientation, or gender identity.            Thank you!     Thank you for choosing [facility]  for your care. Our goal is always to provide you with  excellent care. Hearing back from our patients is one way we can continue to improve our services. Please take a few minutes to complete the written survey that you may receive in the mail after your visit with us. Thank you!             Your Updated Medication List - Protect others around you: Learn how to safely use, store and throw away your medicines at www.disposemymeds.org.          This list is accurate as of 10/1/18  1:11 PM.  Always use your most recent med list.                   Brand Name Dispense Instructions for use Diagnosis    acetylcysteine 600 MG Caps capsule    N-ACETYL CYSTEINE    30 capsule    Take 2 400 mg caps two times daily for total daily dose of 800 mg        albuterol 108 (90 Base) MCG/ACT inhaler    PROAIR HFA/PROVENTIL HFA/VENTOLIN HFA    1 Inhaler    Inhale 2 puffs into the lungs every 6 hours as needed for shortness of breath / dyspnea or wheezing    Exercise-induced asthma       ARIPiprazole 2 MG tablet    ABILIFY    30 tablet    Take 1 tablet (2 mg) by mouth daily    Major depressive disorder, recurrent episode, moderate (H)       aspirin 81 MG EC tablet     30 tablet    Take 1 tablet (81 mg) by mouth daily    Kidney replaced by transplant       blood glucose monitoring lancets     1 Box    Use to test blood sugar 2 times daily or as directed.    Type 2 diabetes mellitus with peripheral neuropathy (H)       * blood glucose monitoring meter device kit    no brand specified    1 kit    Use to test blood sugar 2 times daily or as directed.    Type 2 diabetes mellitus with peripheral neuropathy (H)       * blood glucose monitoring meter device kit           blood glucose monitoring test strip    no brand specified    100 strip    Use to test blood sugars 2 times daily or as directed    Type 2 diabetes mellitus with peripheral neuropathy (H)       cyanocobalamin 1000 MCG tablet    vitamin  B-12    30 tablet    Take 1 tablet by mouth daily.    CKD (chronic kidney disease) stage 5, GFR  less than 15 ml/min (H)       * cycloSPORINE modified 25 MG capsule    GENERIC EQUIVALENT    300 capsule    Take 5 capsules (125 mg) by mouth 2 times daily    Kidney replaced by transplant       * cycloSPORINE modified 25 MG capsule    GENERIC EQUIVALENT    300 capsule    TAKE 5 CAPSULES (125MG) BY MOUTH TWO TIMES A DAY    Kidney replaced by transplant       econazole nitrate 1 % cream     85 g    Apply topically daily    Type II or unspecified type diabetes mellitus with neurological manifestations, not stated as uncontrolled(250.60) (H), Dermatophytosis of foot       fluticasone 50 MCG/ACT spray    FLONASE    1 Bottle    Spray 1 spray into both nostrils daily    Seasonal allergic rhinitis, unspecified chronicity, unspecified trigger       furosemide 20 MG tablet    LASIX    90 tablet    Take 1 tablet (20 mg) by mouth daily    Generalized edema       latanoprost 0.005 % ophthalmic solution    XALATAN    7.5 mL    Place 1 drop into both eyes At Bedtime    Mild stage glaucoma(365.71)       metFORMIN 500 MG 24 hr tablet    GLUCOPHAGE-XR    90 tablet    Take 3 tablets (1,500 mg) by mouth daily (with dinner)    Type 2 diabetes mellitus with diabetic polyneuropathy, without long-term current use of insulin (H)       mycophenolate 250 MG capsule    GENERIC EQUIVALENT    240 capsule    Take 4 capsules (1,000 mg) by mouth 2 times daily    Kidney transplanted       omeprazole 40 MG capsule    priLOSEC    90 capsule    Take 1 capsule (40 mg) by mouth daily    Gastroesophageal reflux disease without esophagitis       ondansetron 4 MG ODT tab    ZOFRAN-ODT    20 tablet    Take 1 tablet (4 mg) by mouth every 6 hours as needed    Chronic kidney disease, stage V (H)       order for DME     1 Units    Walker with front wheels and a seat.    Fall, initial encounter       prazosin 5 MG capsule    MINIPRESS    90 capsule    Take 3 capsules (15 mg) by mouth At Bedtime    Nightmares associated with chronic post-traumatic stress disorder        REFRESH OP      Apply to eye as needed Both eyes        replens Gel     35 g    Use vaginally as needed. Can use up to 3 times per week.    Vaginal dryness       simvastatin 20 MG tablet    ZOCOR    90 tablet    Take 1 tablet (20 mg) by mouth At Bedtime    Chronic kidney disease, stage V (H)       sulfamethoxazole-trimethoprim 400-80 MG per tablet    BACTRIM/SEPTRA    90 tablet    Take 1 tablet by mouth daily    Immunosuppression (H)       topiramate 200 MG tablet    TOPAMAX    60 tablet    Take 1 tablet (200 mg) by mouth 2 times daily    Morbid obesity due to excess calories (H)       TYLENOL 325 MG tablet   Generic drug:  acetaminophen      Take 325-650 mg by mouth as needed        vilazodone 40 MG Tabs tablet    VIIBRYD    30 tablet    Take 1 tablet (40 mg) by mouth daily    Major depressive disorder, recurrent episode, moderate (H)       vitamin D 1000 units capsule     30 capsule    2,000 Units        * Notice:  This list has 4 medication(s) that are the same as other medications prescribed for you. Read the directions carefully, and ask your doctor or other care provider to review them with you.

## 2018-10-01 NOTE — PATIENT INSTRUCTIONS
Thanks for coming today.  [facility]  [address] [place] [address]    To schedule future appointments in Ortho Clinic, you may call [phone].    To schedule ordered imaging by your provider:   Call Central Imaging [phone]    To schedule an injection ordered by your provider:  Call Central Imaging Injection scheduling line: [phone]  [employer] available online at:  [email or web address]    Please call if any further questions or concerns ([phone]).  Clinic hours 8 am to 5 pm.    Return to clinic (call) if symptoms worsen or fail to improve.

## 2018-10-01 NOTE — NURSING NOTE
Elizabeth Palma's chief complaint for this visit includes:  Chief Complaint   Patient presents with     RECHECK     diabet foot check, f/u     PCP: Horacio Sheridan    Referring Provider:  Referred Self, MD  No address on file    /76  Pulse 65  SpO2 99%  No Pain (1)     Do you need any medication refills at today's visit? no

## 2018-10-09 DIAGNOSIS — Z94.0 KIDNEY REPLACED BY TRANSPLANT: ICD-10-CM

## 2018-10-09 LAB
CYCLOSPORINE BLD LC/MS/MS-MCNC: 99 UG/L (ref 50–400)
TME LAST DOSE: NORMAL H

## 2018-10-16 ENCOUNTER — OFFICE VISIT (OUTPATIENT)
Dept: INTERNAL MEDICINE | Facility: CLINIC | Age: 63
End: 2018-10-16
Payer: MEDICARE

## 2018-10-16 ENCOUNTER — PRE VISIT (OUTPATIENT)
Dept: UROLOGY | Facility: CLINIC | Age: 63
End: 2018-10-16

## 2018-10-16 VITALS
RESPIRATION RATE: 20 BRPM | DIASTOLIC BLOOD PRESSURE: 64 MMHG | HEART RATE: 78 BPM | OXYGEN SATURATION: 98 % | SYSTOLIC BLOOD PRESSURE: 99 MMHG | WEIGHT: 149.3 LBS | BODY MASS INDEX: 28.21 KG/M2

## 2018-10-16 DIAGNOSIS — R33.9 INABILITY TO URINATE: Primary | ICD-10-CM

## 2018-10-16 DIAGNOSIS — N39.3 FEMALE STRESS INCONTINENCE: ICD-10-CM

## 2018-10-16 ASSESSMENT — PAIN SCALES - GENERAL: PAINLEVEL: NO PAIN (0)

## 2018-10-16 NOTE — PATIENT INSTRUCTIONS
[facility] Medication Refill Request Information:  * Please contact your pharmacy regarding ANY request for medication refills.  ** [facility] Prescription Fax = [phone]  * Please allow 3 business days for routine medication refills.  * Please allow 5 business days for controlled substance medication refills.     [facility] Test Result notification information:  *You will be notified with in 7-10 days of your appointment day regarding the results of your test.  If you are on MyChart you will be notified as soon as the provider has reviewed the results and signed off on them.    [facility]: [phone]
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
## 2018-10-16 NOTE — TELEPHONE ENCOUNTER
Reason for visit: incontinence and inability to urinate     Relevant information: pt referred by [facility]    Records/imaging/labs: all records available    Pt called: no need for a call     Rooming: dip/pvr

## 2018-10-16 NOTE — MR AVS SNAPSHOT
After Visit Summary   10/16/2018    Elizabeth Palma    MRN: [id number]           Patient Information     Date Of Birth          1955        Visit Information        Provider Department      10/16/2018 10:30 AM Horacio Sheridan MD [facility]        Today's Diagnoses     Inability to urinate    -  1    Female stress incontinence          Care Instructions    Primary Care Center Medication Refill Request Information:  * Please contact your pharmacy regarding ANY request for medication refills.  ** [facility] Prescription Fax = [phone]  * Please allow 3 business days for routine medication refills.  * Please allow 5 business days for controlled substance medication refills.     Primary Care Center Test Result notification information:  *You will be notified with in 7-10 days of your appointment day regarding the results of your test.  If you are on MyChart you will be notified as soon as the provider has reviewed the results and signed off on them.    [facility]: [phone]           Follow-ups after your visit        Additional Services     UROLOGY ADULT REFERRAL       Your provider has referred you to: [facility]    She sometimes has dribbling and inability to hold her urine and sometimes unable to get any urine out despite an urge to go.    Please be aware that coverage of these services is subject to the terms and limitations of your health insurance plan.  Call member services at your health plan with any benefit or coverage questions.      Please bring the following with you to your appointment:    (1) Any X-Rays, CTs or MRIs which have been performed.  Contact the facility where they were done to arrange for  prior to your scheduled appointment.    (2) List of current medications  (3) This referral request   (4) Any documents/labs given to you for this referral                  Follow-up notes from your care team     Return in about 3 months (around 1/16/2019).       Your next 10 appointments already scheduled     Oct 18, 2018 10:30 AM CDT   (Arrive by 10:15 AM)   NEW FEMALE PELVIC with Caren See Mathieu Calderon MD   [facility] Prostate and Urologic Cancers ([facility])    [address] [place] [address]  4th [facility] [address] [phone]            Oct 30, 2018  9:30 AM CDT   (Arrive by 9:15 AM)   Return Bariatric Nutrition Visit with Carrie Chawla RD   [facility] Surgical Weight Management ([facility])    [address] [place] [address]   [phone]            Nov 05, 2018 12:00 PM CST   DX HIP/PELVIS/SPINE with UCDX1   [facility] Dexa ([facility])    [address] [place] [id number] [phone]           How do I prepare for my exam? (Food and drink instructions) No Food and Drink Restrictions.  How do I prepare for my exam? (Other instructions) Please do not take any of the following 24 hours prior to the day of your exam: vitamins, calcium tablets, antacids.  What should I wear: If possible, please wear clothes without metal (snaps, zippers). A sweat suit works well.  How long does the exam take: The exam takes about 20 minutes.  What should I bring: Bring a list of your current medicines to your exam (including vitamins, minerals and over-the-counter drugs).  Do I need a :  No  is needed.  What should I do after the exam: No restrictions, You may resume normal activities.  How do I prepare for my exam? (Food and drink instructions) A DEXA scan is a bone-density scan. It uses a low level of radiation to check the strength of your bones. As you lie on a padded table, a machine will take X-rays. We most often scan the hips and lower spine.  Who should I call with questions: If you have any questions, please call the Imaging Department where you will have your exam. Directions, parking  instructions, and other information is available on our website, [email or web address].            Nov 05, 2018  1:00 PM CST   (Arrive by 12:45 PM)   RETURN ENDOCRINE with Lizbet Byers MD   [facility] ([facility])    [address] [place] [address]   [phone]            Nov 06, 2018  1:00 PM CST   Adult Med Follow UP with Chaparrita Hatch MD   [facility] ([facility])    [address]  [facility] [address]   [phone]            Nov 12, 2018 10:30 AM CST   (Arrive by 10:15 AM)   Return Visit with Prakash Irene MD   [facility] Medical Weight Management ([facility])    [address] [place] [address]   [phone]            Nov 27, 2018  9:30 AM CST   (Arrive by 9:15 AM)   NUTRITION VISIT with Leyla Guerrero RD   [facility] ([facility])    [address] [place] [address]   [phone]            Dec 24, 2018  9:30 AM CST   (Arrive by 9:15 AM)   Return Nutrition with Maisha Henderson RD   [facility] ([facility])    [address] [place] [address]   [phone]            Jan 14, 2019  1:00 PM CST   (Arrive by 12:45 PM)   [facility]    [address] [place] [address] [phone]            Jan 28, 2019 10:00 AM CST   (Arrive by 9:45 AM)   Return Visit with Horacio Sheridan MD   [facility] ([facility])    [address] [place] [id number] [phone]              Future tests that were ordered for you today     Open Future Orders        Priority Expected Expires Ordered    UA  with Microscopic reflex to Culture Routine 10/16/2018 11/15/2018 10/16/2018            Who to contact     Please call your clinic at [phone] to:    Ask questions about your health    Make or cancel appointments    Discuss your medicines    Learn about your test results    Speak to your doctor            Additional Information About Your Visit        [employer] Information     [employer] gives you secure access to your electronic health record. If you see a primary care provider, you can also send messages to your care team and make appointments. If you have questions, please call your primary care clinic.  If you do not have a primary care provider, please call [phone] and they will assist you.      [employer] is an electronic gateway that provides easy, online access to your medical records. With [employer], you can request a clinic appointment, read your test results, renew a prescription or communicate with your care team.     To access your existing account, please contact your [facility] or call [phone] for assistance.        Care EveryWhere ID     This is your Care EveryWhere ID. This could be used by other organizations to access your [facility] medical records  [id number]        Your Vitals Were     Pulse Respirations Pulse Oximetry BMI (Body Mass Index)          78 20 98% 28.21 kg/m2         Blood Pressure from Last 3 Encounters:   10/16/18 99/64   10/01/18 123/76   09/18/18 123/81    Weight from Last 3 Encounters:   10/16/18 67.7 kg (149 lb 4.8 oz)   09/25/18 67.9 kg (149 lb 12.8 oz)   09/13/18 69.4 kg (153 lb)              We Performed the Following     UROLOGY ADULT REFERRAL          Today's Medication Changes          These changes are accurate as of 10/16/18 11:06 AM.  If you have any questions, ask your nurse or doctor.               These medicines have changed or have updated prescriptions.        Dose/Directions    cyanocobalamin 1000 MCG tablet   Commonly known  as:  vitamin  B-12   This may have changed:  when to take this   Used for:  CKD (chronic kidney disease) stage 5, GFR less than 15 ml/min (H)        Dose:  1000 mcg   Take 1 tablet by mouth daily.   Quantity:  30 tablet   Refills:  0                Primary Care Provider Office Phone # Fax #    Horacio Sheridan [phone]       [address] [place] [phone]  [facility] [address]        Equal Access to Services     LINNEA HIDALGO : Hadii aad ku hadasho Soomaali, waaxda luqadaha, qaybta kaalmada adeegyada, waxay idiin hayaan adeeg madelyntannercoy agrawal . So [facility] [phone].    ATENCIÓN: Si habla español, tiene a goetz disposición servicios gratuitos de asistencia lingüística. [facility] [phone].    We comply with applicable federal civil rights laws and [place] laws. We do not discriminate on the basis of race, color, national origin, age, disability, sex, sexual orientation, or gender identity.            Thank you!     Thank you for choosing [facility]  for your care. Our goal is always to provide you with excellent care. Hearing back from our patients is one way we can continue to improve our services. Please take a few minutes to complete the written survey that you may receive in the mail after your visit with us. Thank you!             Your Updated Medication List - Protect others around you: Learn how to safely use, store and throw away your medicines at www.disposemymeds.org.          This list is accurate as of 10/16/18 11:06 AM.  Always use your most recent med list.                   Brand Name Dispense Instructions for use Diagnosis    acetylcysteine 600 MG Caps capsule    N-ACETYL CYSTEINE    30 capsule    Take 2 400 mg caps two times daily for total daily dose of 800 mg        albuterol 108 (90 Base) MCG/ACT inhaler    PROAIR HFA/PROVENTIL HFA/VENTOLIN HFA    1 Inhaler    Inhale 2 puffs into the lungs every 6 hours as needed for shortness of breath / dyspnea or wheezing     Exercise-induced asthma       ARIPiprazole 2 MG tablet    ABILIFY    30 tablet    Take 1 tablet (2 mg) by mouth daily    Major depressive disorder, recurrent episode, moderate (H)       aspirin 81 MG EC tablet     30 tablet    Take 1 tablet (81 mg) by mouth daily    Kidney replaced by transplant       blood glucose monitoring lancets     1 Box    Use to test blood sugar 2 times daily or as directed.    Type 2 diabetes mellitus with peripheral neuropathy (H)       * blood glucose monitoring meter device kit    no brand specified    1 kit    Use to test blood sugar 2 times daily or as directed.    Type 2 diabetes mellitus with peripheral neuropathy (H)       * blood glucose monitoring meter device kit           blood glucose monitoring test strip    no brand specified    100 strip    Use to test blood sugars 2 times daily or as directed    Type 2 diabetes mellitus with peripheral neuropathy (H)       cyanocobalamin 1000 MCG tablet    vitamin  B-12    30 tablet    Take 1 tablet by mouth daily.    CKD (chronic kidney disease) stage 5, GFR less than 15 ml/min (H)       * cycloSPORINE modified 25 MG capsule    GENERIC EQUIVALENT    300 capsule    Take 5 capsules (125 mg) by mouth 2 times daily    Kidney replaced by transplant       * cycloSPORINE modified 25 MG capsule    GENERIC EQUIVALENT    300 capsule    TAKE 5 CAPSULES (125MG) BY MOUTH TWO TIMES A DAY    Kidney replaced by transplant       econazole nitrate 1 % cream     85 g    Apply topically daily    Type II or unspecified type diabetes mellitus with neurological manifestations, not stated as uncontrolled(250.60) (H), Dermatophytosis of foot       fluticasone 50 MCG/ACT spray    FLONASE    1 Bottle    Spray 1 spray into both nostrils daily    Seasonal allergic rhinitis, unspecified chronicity, unspecified trigger       furosemide 20 MG tablet    LASIX    90 tablet    Take 1 tablet (20 mg) by mouth daily    Generalized edema       latanoprost 0.005 % ophthalmic  solution    XALATAN    7.5 mL    Place 1 drop into both eyes At Bedtime    Mild stage glaucoma(365.71)       metFORMIN 500 MG 24 hr tablet    GLUCOPHAGE-XR    90 tablet    Take 3 tablets (1,500 mg) by mouth daily (with dinner)    Type 2 diabetes mellitus with diabetic polyneuropathy, without long-term current use of insulin (H)       mycophenolate 250 MG capsule    GENERIC EQUIVALENT    240 capsule    Take 4 capsules (1,000 mg) by mouth 2 times daily    Kidney transplanted       omeprazole 40 MG capsule    priLOSEC    90 capsule    Take 1 capsule (40 mg) by mouth daily    Gastroesophageal reflux disease without esophagitis       ondansetron 4 MG ODT tab    ZOFRAN-ODT    20 tablet    Take 1 tablet (4 mg) by mouth every 6 hours as needed    Chronic kidney disease, stage V (H)       order for DME     1 Units    Walker with front wheels and a seat.    Fall, initial encounter       prazosin 5 MG capsule    MINIPRESS    90 capsule    Take 3 capsules (15 mg) by mouth At Bedtime    Nightmares associated with chronic post-traumatic stress disorder       REFRESH OP      Apply to eye as needed Both eyes        replens Gel     35 g    Use vaginally as needed. Can use up to 3 times per week.    Vaginal dryness       simvastatin 20 MG tablet    ZOCOR    90 tablet    Take 1 tablet (20 mg) by mouth At Bedtime    Chronic kidney disease, stage V (H)       sulfamethoxazole-trimethoprim 400-80 MG per tablet    BACTRIM/SEPTRA    90 tablet    Take 1 tablet by mouth daily    Immunosuppression (H)       topiramate 200 MG tablet    TOPAMAX    60 tablet    Take 1 tablet (200 mg) by mouth 2 times daily    Morbid obesity due to excess calories (H)       TYLENOL 325 MG tablet   Generic drug:  acetaminophen      Take 325-650 mg by mouth as needed        vilazodone 40 MG Tabs tablet    VIIBRYD    30 tablet    Take 1 tablet (40 mg) by mouth daily    Major depressive disorder, recurrent episode, moderate (H)       vitamin D 1000 units capsule      30 capsule    2,000 Units        * Notice:  This list has 4 medication(s) that are the same as other medications prescribed for you. Read the directions carefully, and ask your doctor or other care provider to review them with you.

## 2018-10-16 NOTE — PROGRESS NOTES
"ASSESSMENT/PLAN:  Elizabeth was seen today for urgency and pain with urination.  It is confusing because she has both inability to urinate and leakage of urine.  I think she needs to see urology for further investigation.    Inability to urinate and incontinence  -     UA with Microscopic reflex to Culture; Future  -     UROLOGY ADULT REFERRAL    Horacio Sheridan MD, FACP      Chief complaint:  Elizabeth Palma is a 63 year old female presents for   Chief Complaint   Patient presents with     Urgency     \" I have bladder problems. I have urgency to go and then I can't go.Sometimes I have to go and I don't make it to the bathroom on time.\"     Pain     \" I still have pain in my leg\" -right lower leg, \" My leg was slammed in a car door in April.\"        SUBJECTIVE:  Sometimes she can not hold her urine and will dribble so has to wear a pad - if she stands this will happen but not with laughing.  She will then have to get to the bathroom quickly or will leak out.  No full bladder emptying.  Other times, she feels she has to go but can't.  She says that she cries because she cannot get the urine out.  She is frustrated by this.  She says that she has tried to get help but nobody will help her (?).  This started 2 months ago suddenly and is frustrated.  She wonders if she may have had a urine infection - she had a fever she says (undocumented).  She says that she had an infection and they would not treat it but I pointed out that her UA/UC was not indicative of an UTI.    The only change was stopping Benadryl but that did not help.  She drinks caffeine 0-2 times a day but unchanged.  She tries to drink a lot of water but this has not changed.  She is on lasix but that has not changed in years.    She had a post void residual last time which showed 0 ml.    April 2018 a car door hit her right medial shin.  She still has pain.  There is a little redness there too.    Medications and allergies were reviewed by me today. "       Patient Active Problem List    Diagnosis Date Noted     Type 2 diabetes mellitus with peripheral neuropathy (H) 2015     Priority: High     -donor kidney transplant 2014     Priority: High     Major depressive disorder, recurrent episode, moderate (H) 11/15/2012     Priority: High     Autosomal dominant polycystic kidney disease 2011     Priority: High     (Problem list name updated by automated process. Provider to review and confirm.)       OP (osteoporosis) T score -3.8 2009     Priority: High     2007 T-score -3.7       Narcissistic personality disorder (H) 2018     Priority: Medium     Age-related osteoporosis without current pathological fracture 08/10/2016     Priority: Medium     Right knee pain 2016     Priority: Medium     Left knee pain 2016     Priority: Medium     Posttraumatic stress disorder 2015     Priority: Medium     Nightmares associated with chronic post-traumatic stress disorder 10/27/2015     Priority: Medium     Pain in joint involving ankle and foot 2015     Priority: Medium     Senile osteoporosis 2015     Priority: Medium     Hyperparathyroidism (H) 2015     Priority: Medium     Problem list name updated by automated process. Provider to review       Hyperparathyroidism, secondary (H) 2015     Priority: Medium     Immunosuppressed status (H) 2014     Priority: Medium     Pain in joint, forearm -- L unhealed Fx 2013     Priority: Medium     CMC DJD(carpometacarpal degenerative joint disease), localized primary 2013     Priority: Medium     Generalized anxiety disorder 11/15/2012     Priority: Medium     LION on CPAP 10/15/2012     Priority: Medium     Premature menopause age 37 07/10/2012     Priority: Medium     OCP (vaginal bldg)-->HT which she stopped 2 mo later documented at 2007 visit (age 51).       Sensory loss 10/17/2011     Priority: Medium     Bottom of feet; uncertain if  there is a neuropathy per notes.        Hypertension 10/15/2011     Priority: Medium     Hyperlipidemia 10/15/2011     Priority: Medium     Abnormal MRI, cervical spine 10/15/2011     Priority: Medium     4/2011; mild changes noted. Study done for left arm symptoms  Impression:   1. Mild multilevel degenerative disc disease with no significant canal  or neural stenosis seen. motion artifact on the STIR images in these  are not interpretable. The remaining images were interpreted         PHYSICAL EXAM:  BP 99/64 (BP Location: Right arm, Patient Position: Sitting, Cuff Size: Adult Regular)  Pulse 78  Resp 20  Wt 67.7 kg (149 lb 4.8 oz)  SpO2 98%  BMI 28.21 kg/m2  Constitutional: no distress, comfortable, pleasant   Gastrointestinal: no mass or tenderness  Skin: slight redness of the right medial shin, slightly tender to touch

## 2018-10-18 ENCOUNTER — OFFICE VISIT (OUTPATIENT)
Dept: UROLOGY | Facility: CLINIC | Age: 63
End: 2018-10-18
Payer: MEDICARE

## 2018-10-18 VITALS
HEART RATE: 64 BPM | WEIGHT: 149 LBS | DIASTOLIC BLOOD PRESSURE: 71 MMHG | HEIGHT: 61 IN | BODY MASS INDEX: 28.13 KG/M2 | SYSTOLIC BLOOD PRESSURE: 125 MMHG

## 2018-10-18 DIAGNOSIS — R39.15 URINARY URGENCY: Primary | ICD-10-CM

## 2018-10-18 DIAGNOSIS — Z98.84 HISTORY OF GASTRIC BYPASS: ICD-10-CM

## 2018-10-18 DIAGNOSIS — Z94.0 KIDNEY REPLACED BY TRANSPLANT: ICD-10-CM

## 2018-10-18 DIAGNOSIS — Z87.442 HISTORY OF NEPHROLITHIASIS: ICD-10-CM

## 2018-10-18 DIAGNOSIS — R82.90 ABNORMAL URINALYSIS: ICD-10-CM

## 2018-10-18 DIAGNOSIS — Z94.0 DECEASED-DONOR KIDNEY TRANSPLANT: ICD-10-CM

## 2018-10-18 LAB
ALBUMIN UR-MCNC: NEGATIVE MG/DL
APPEARANCE UR: ABNORMAL
APPEARANCE UR: CLEAR
BACTERIA #/AREA URNS HPF: ABNORMAL /HPF
BILIRUB UR QL STRIP: NEGATIVE
BILIRUB UR QL: NORMAL
CAOX CRY #/AREA URNS HPF: ABNORMAL /HPF
COLOR UR AUTO: YELLOW
COLOR UR: YELLOW
GLUCOSE UR STRIP-MCNC: NEGATIVE MG/DL
GLUCOSE URINE: NORMAL MG/DL
HGB UR QL STRIP: ABNORMAL
HGB UR QL: NORMAL
KETONES UR QL: NORMAL MG/DL
KETONES UR STRIP-MCNC: NEGATIVE MG/DL
LEUKOCYTE ESTERASE UR QL STRIP: ABNORMAL
LEUKOCYTE ESTERASE URINE: NORMAL
MUCOUS THREADS #/AREA URNS LPF: PRESENT /LPF
NITRATE UR QL: POSITIVE
NITRITE UR QL STRIP: NORMAL
PH UR STRIP: 6 PH (ref 5–7)
PH UR STRIP: 7 PH (ref 5–7)
PROTEIN ALBUMIN URINE: NORMAL MG/DL
RBC #/AREA URNS AUTO: 10 /HPF (ref 0–2)
SOURCE: ABNORMAL
SOURCE: NORMAL
SP GR UR STRIP: 1.02 (ref 1–1.03)
SP GR UR STRIP: 1.02 (ref 1–1.03)
SQUAMOUS #/AREA URNS AUTO: 3 /HPF (ref 0–1)
UROBILINOGEN UR QL STRIP: 0.2 EU/DL (ref 0.2–1)
UROBILINOGEN UR STRIP-MCNC: 0 MG/DL (ref 0–2)
WBC #/AREA URNS AUTO: 86 /HPF (ref 0–5)
WBC CLUMPS #/AREA URNS HPF: PRESENT /HPF

## 2018-10-18 RX ORDER — CYCLOSPORINE 25 MG/1
CAPSULE, LIQUID FILLED ORAL
Qty: 300 CAPSULE | Refills: 6 | Status: SHIPPED | OUTPATIENT
Start: 2018-10-18 | End: 2019-05-29

## 2018-10-18 RX ORDER — CEPHALEXIN 500 MG/1
500 CAPSULE ORAL 2 TIMES DAILY
Qty: 10 CAPSULE | Refills: 0 | Status: SHIPPED | OUTPATIENT
Start: 2018-10-18 | End: 2019-04-23

## 2018-10-18 ASSESSMENT — ENCOUNTER SYMPTOMS
RECTAL PAIN: 0
BACK PAIN: 0
NUMBNESS: 0
EYE PAIN: 0
EYE IRRITATION: 0
COUGH DISTURBING SLEEP: 0
WEAKNESS: 0
HEADACHES: 0
HEMOPTYSIS: 0
EXTREMITY NUMBNESS: 0
DOUBLE VISION: 0
BRUISES/BLEEDS EASILY: 0
LOSS OF CONSCIOUSNESS: 0
DIZZINESS: 0
DECREASED APPETITE: 0
ABDOMINAL PAIN: 0
DYSPNEA ON EXERTION: 0
EYE WATERING: 0
INCREASED ENERGY: 0
INSOMNIA: 0
HALLUCINATIONS: 0
NAUSEA: 0
POOR WOUND HEALING: 0
SPEECH CHANGE: 0
SEIZURES: 0
PARALYSIS: 0
VOMITING: 0
PANIC: 0
BOWEL INCONTINENCE: 0
FLANK PAIN: 0
DISTURBANCES IN COORDINATION: 0
TREMORS: 0
JOINT SWELLING: 0
CLAUDICATION: 0
NAIL CHANGES: 0
HYPOTENSION: 0
EXERCISE INTOLERANCE: 0
HOT FLASHES: 0
JAUNDICE: 0
FATIGUE: 0
MYALGIAS: 0
WEIGHT LOSS: 0
SMELL DISTURBANCE: 0
DIARRHEA: 0
LEG SWELLING: 0
HEMATURIA: 0
MUSCLE CRAMPS: 0
WHEEZING: 0
BLOOD IN STOOL: 0
POLYPHAGIA: 0
NECK PAIN: 0
ORTHOPNEA: 0
TINGLING: 0
SPUTUM PRODUCTION: 0
WEIGHT GAIN: 0
POSTURAL DYSPNEA: 0
LEG PAIN: 0
PALPITATIONS: 0
LIGHT-HEADEDNESS: 0
SNORES LOUDLY: 0
SINUS PAIN: 0
CHILLS: 0
ALTERED TEMPERATURE REGULATION: 0
DECREASED CONCENTRATION: 0
NERVOUS/ANXIOUS: 1
RESPIRATORY PAIN: 0
HYPERTENSION: 0
ARTHRALGIAS: 0
CONSTIPATION: 0
HEARTBURN: 0
FEVER: 0
TROUBLE SWALLOWING: 0
NECK MASS: 0
SORE THROAT: 0
STIFFNESS: 0
BREAST MASS: 0
SYNCOPE: 0
COUGH: 0
TACHYCARDIA: 0
DECREASED LIBIDO: 0
BLOATING: 0
SLEEP DISTURBANCES DUE TO BREATHING: 0
DEPRESSION: 1
SINUS CONGESTION: 0
SWOLLEN GLANDS: 0
MUSCLE WEAKNESS: 0
HOARSE VOICE: 0
BREAST PAIN: 0
NIGHT SWEATS: 0
TASTE DISTURBANCE: 0
SHORTNESS OF BREATH: 0
EYE REDNESS: 0
POLYDIPSIA: 0
RECTAL BLEEDING: 0
SKIN CHANGES: 0
DIFFICULTY URINATING: 0
MEMORY LOSS: 0

## 2018-10-18 ASSESSMENT — PAIN SCALES - GENERAL: PAINLEVEL: NO PAIN (0)

## 2018-10-18 NOTE — PROGRESS NOTES
2018    CC: Urinary Urgency     HPI:  Elizabeth Palma is a 63 year old female asked to be seen in consultation by  for the above.  This problem has been going on for 3 months and has been getting worse.  It is associated with urge incontinence.  She has episodes of incontinence per day and has been using 1 pads per day.  She has  had no previous treatment for her condition.  The patient voids q 3-4 hours, nocturia X 1-2.  She denies hematuria or incomplete bladder emptying. Patient states that she has never had a UTIs. Patient drinks 16 oz H20 x 4.     The patient has no constipation or splinting- had daily BMs.     Obstetric Hx:  She is . . Last menstrual period was at 35 s/p hysterectomy.     Past Medical History:   Diagnosis Date     Abnormal MRI, cervical spine 10/15/2011    2011; mild changes noted. Study done for left arm symptoms Impression:  1. Mild multilevel degenerative disc disease with no significant canal or neural stenosis seen. motion artifact on the STIR images in these are not interpretable. The remaining images were interpreted      Autosomal dominant polycystic kidney disease 2011     (Problem list name updated by automated process. Provider to review and confirm.)     [facility] DJD(carpometacarpal degenerative joint disease), localized primary 3/5/2013     -donor kidney transplant 3/20/2014     Depressive disorder 11/15/2012     DM type 2 (diabetes mellitus, type 2) (H) 2013     Encounter for long-term (current) use of other medications 2015     Family history of tremor 10/17/2011     Generalized anxiety disorder 11/15/2012     Glaucoma      Hyperlipidemia 10/15/2011     Hyperparathyroidism, secondary (H) 2015     Hypertension     resolved     Immunosuppressed status (H) 3/20/2014     Major depressive disorder, recurrent episode, moderate (H) 11/15/2012     Obesity (BMI 30-39.9)      OP (osteoporosis) T score -3.8 2009    2007 T-score  -3.7      LION (obstructive sleep apnoea) 10/15/2012    intol to cpa     Pain in joint, forearm -- L unhealed Fx 5/21/2013     Premature menopause age 37 7/10/2012    OCP (vaginal bldg)-->HT which she stopped 2 mo later documented at Jan 12, 2007 visit (age 51).      Restless leg syndrome      Rib fractures 4/13/2013     Sensory loss 10/17/2011    Bottom of feet; uncertain if there is a neuropathy per notes.       Stiffness of joint, not elsewhere classified, hand 3/5/2013     Tremor 10/15/2011    head     Uncomplicated asthma        Past Surgical History:   Procedure Laterality Date     ABDOMEN SURGERY       ANKLE SURGERY       C TRANSPLANTATION OF KIDNEY  3/2014     C/SECTION, LOW TRANSVERSE      x 2     CHOLECYSTECTOMY  1990     COLONOSCOPY       ESOPHAGOSCOPY, GASTROSCOPY, DUODENOSCOPY (EGD), COMBINED N/A 5/19/2015    Procedure: COMBINED ESOPHAGOSCOPY, GASTROSCOPY, DUODENOSCOPY (EGD);  Surgeon: Sky Davey MD;  Location:  GI     ESOPHAGOSCOPY, GASTROSCOPY, DUODENOSCOPY (EGD), COMBINED N/A 5/19/2015    Procedure: COMBINED ESOPHAGOSCOPY, GASTROSCOPY, DUODENOSCOPY (EGD), BIOPSY SINGLE OR MULTIPLE;  Surgeon: Sky Davey MD;  Location:  GI     EYE SURGERY       LAPAROSCOPY, SURGICAL; REPAIR INCISIONAL OR VENTRAL HERNIA       ORTHOPEDIC SURGERY       HERMINIA EN Y BOWEL  1990     WRIST SURGERY         Meds, Allergies, FHx and SHx reviewed per nurse's intake note.    Review of Systems     Constitutional:  Negative for fever, chills, weight loss, weight gain, fatigue, decreased appetite, night sweats, recent stressors, height gain, height loss, post-operative complications, incisional pain, hallucinations, increased energy, hyperactivity and confused.   HENT:  Negative for ear pain, hearing loss, tinnitus, nosebleeds, trouble swallowing, hoarse voice, mouth sores, sore throat, ear discharge, tooth pain, gum tenderness, taste disturbance, smell disturbance, hearing aid, bleeding gums, dry mouth, sinus  pain, sinus congestion and neck mass.    Eyes:  Negative for double vision, pain, redness, eye pain, decreased vision, eye watering, eye bulging, eye dryness, flashing lights, spots, floaters, strabismus, tunnel vision, jaundice and eye irritation.   Respiratory:   Negative for cough, hemoptysis, sputum production, shortness of breath, wheezing, sleep disturbances due to breathing, snores loudly, respiratory pain, dyspnea on exertion, cough disturbing sleep and postural dyspnea.    Cardiovascular:  Negative for chest pain, dyspnea on exertion, palpitations, orthopnea, claudication, leg swelling, fingers/toes turn blue, hypertension, hypotension, syncope, history of heart murmur, chest pain on exertion, chest pain at rest, pacemaker, few scattered varicosities, leg pain, sleep disturbances due to breathing, tachycardia, light-headedness, exercise intolerance and edema.   Gastrointestinal:  Negative for heartburn, nausea, vomiting, abdominal pain, diarrhea, constipation, blood in stool, melena, rectal pain, bloating, hemorrhoids, bowel incontinence, jaundice, rectal bleeding, coffee ground emesis and change in stool.   Genitourinary:  Positive for bladder incontinence. Negative for hematuria, flank pain, vaginal discharge, difficulty urinating, genital sores, dyspareunia, decreased libido, nocturia, arousal difficulty, abnormal vaginal bleeding, excessive menstruation, menstrual changes, hot flashes, vaginal dryness and postmenopausal bleeding.   Musculoskeletal:  Negative for myalgias, back pain, joint swelling, arthralgias, stiffness, muscle cramps, neck pain, bone pain, muscle weakness and fracture.   Skin:  Negative for nail changes, itching, poor wound healing, rash, hair changes, skin changes, acne, warts, poor wound healing, scarring, flaky skin, Raynaud's phenomenon, sensitivity to sunlight and skin thickening.   Neurological:  Negative for dizziness, tingling, tremors, speech change, seizures, loss of  "consciousness, weakness, light-headedness, numbness, headaches, disturbances in coordination, extremity numbness, memory loss, difficulty walking and paralysis.   Endo/Heme:  Negative for anemia, swollen glands and bruises/bleeds easily.   Psychiatric/Behavioral:  Positive for depression and mood swings. Negative for hallucinations, memory loss, decreased concentration and panic attacks.    Breast:  Negative for breast discharge, breast mass, breast pain and nipple retraction.   Endocrine:  Negative for altered temperature regulation, polyphagia, polydipsia, unwanted hair growth and change in facial hair.    PEx:   Blood pressure 125/71, pulse 64, height 1.549 m (5' 1\"), weight 67.6 kg (149 lb), not currently breastfeeding.  5' 1\", Body mass index is 28.15 kg/(m^2)., 149 lbs 0 oz  Gen appearance:  Well groomed  HEENT:  EOMI, AT NC  Psych:  Normal Affect  Neuro:  A/O X 3  Skin:  Warm to touch  Resp:  No increased respiratory effort  Vasc:  RRR  lymph:  No LE edema  Abd:  Soft/NT, ND, no palpable masses  Musk:  Full ROM in extremities    RU: 0 on bladder scan by nursing.    Dipstick: positive nitrites     ASSESSMENT and PLAN:  This is a 63 year old female with history of bilateral PCKD s/p renal transplant, history of gastric bypass, history of nephrolithiasis, acute onset urinary urgency. Urine dip today shows positive nitrites.     - Will order UA/UC.   - Keflex 500 mg  BID empirically as she is immunocompromised  - Will order CT Abd/pelvis w/o contrast to evaluate for stone in kidneys given that she has a history of stones and gastric bypass  -Voiding diary to assess for modifiable factors  - Patient to call clinic if she her symptoms worsen     Thank you for allowing me to participate in Ms. Palma's care.  I will keep you updated on her progress.    Patient discussed and examined with Dr. Kvng North MD   Urology Resident PGY-4    Addendum:    The patient was seen and evaluated with the " resident.  The plan was formulated in conjunction with me and I agree with the above note with changes made as necessary.    Will plan to treat what seems like a UTI and assess for stones as nidus for UTI.  She will return with her voiding diary after the CT scan    30 minutes were spent with patient today, >50% in counseling and coordination of care    Caren Calderon MD MPH   of Urology    CC  Patient Care Team:  Radha Sands MD as PCP - General (Internal Medicine)  Javier Tuttle DPM (Podiatry)  Sky Davey MD as MD (Surgery)  Fina Garcia MD as MD (INTERNAL MEDICINE - ENDOCRINOLOGY, DIABETES & METABOLISM)  AUSTIN Hudson MD as MD (Dermatology)  Chaparrita Hatch MD as MD (Psychiatry)  Prakash Irene MD as MD (INTERNAL MEDICINE - ENDOCRINOLOGY, DIABETES & METABOLISM)  Dex Garza MD as MD (Transplant)  Yonas Underwood MD as MD (Ophthalmology)  Gerber Dickerson MD as MD (Neurology)  Stiven Painting MD as MD (Neurology)  Yobani Hammonds MD as MD (Orthopedics)  Lizbet Byers MD as MD (INTERNAL MEDICINE - ENDOCRINOLOGY, DIABETES & METABOLISM)  Christian Jimenez MD as Hospitalist (Internal Medicine)  Era Gonzalez, PhD LP ([facility])  Param Salas, GERHARD CNM as Midwife (Midwives)  Patito Ruhs MD as MD (Dermatology)  Caren Calderon MD as MD (Urology)  RADHA SANDS

## 2018-10-18 NOTE — NURSING NOTE
"Chief Complaint   Patient presents with     Consult     Urgency and incontinence       Blood pressure 125/71, pulse 64, height 1.549 m (5' 1\"), weight 67.6 kg (149 lb), not currently breastfeeding. Body mass index is 28.15 kg/(m^2).    Patient Active Problem List   Diagnosis     Autosomal dominant polycystic kidney disease     Hypertension     Hyperlipidemia     Abnormal MRI, cervical spine     Sensory loss     Premature menopause age 37     OP (osteoporosis) T score -3.8     LION on CPAP     Major depressive disorder, recurrent episode, moderate (H)     Generalized anxiety disorder     [facility] DJD(carpometacarpal degenerative joint disease), localized primary     Pain in joint, forearm -- L unhealed Fx     -donor kidney transplant     Immunosuppressed status (H)     Hyperparathyroidism, secondary (H)     Hyperparathyroidism (H)     Senile osteoporosis     Pain in joint involving ankle and foot     Nightmares associated with chronic post-traumatic stress disorder     Type 2 diabetes mellitus with peripheral neuropathy (H)     Posttraumatic stress disorder     Right knee pain     Left knee pain     Age-related osteoporosis without current pathological fracture     Narcissistic personality disorder (H)       Allergies   Allergen Reactions     Percocet [Oxycodone-Acetaminophen] Nausea and Vomiting     Novocain [Procaine Hcl] Hives     Had reaction 25 years ago to old renetta. Pt reports multiple injections of lidocaine since then without reaction.  Tolerated lidocaine injection today without difficulty.  Osmar Mark MD IR Service.       Current Outpatient Prescriptions   Medication Sig Dispense Refill     acetaminophen (TYLENOL) 325 MG tablet Take 325-650 mg by mouth as needed       acetylcysteine (N-ACETYL-L-CYSTEINE) 600 MG CAPS capsule Take 2 400 mg caps two times daily for total daily dose of 800 mg 30 capsule      albuterol (PROAIR HFA/PROVENTIL HFA/VENTOLIN HFA) 108 (90 Base) MCG/ACT inhaler Inhale 2 puffs " into the lungs every 6 hours as needed for shortness of breath / dyspnea or wheezing 1 Inhaler 5     ARIPiprazole (ABILIFY) 2 MG tablet Take 1 tablet (2 mg) by mouth daily 30 tablet 2     aspirin EC 81 MG EC tablet Take 1 tablet (81 mg) by mouth daily 30 tablet 5     blood glucose monitoring (ACCU-CHEK RALPH PLUS) meter device kit   0     blood glucose monitoring (NO BRAND SPECIFIED) meter device kit Use to test blood sugar 2 times daily or as directed. 1 kit 0     blood glucose monitoring (NO BRAND SPECIFIED) test strip Use to test blood sugars 2 times daily or as directed 100 strip 11     blood glucose monitoring (SOFTCLIX) lancets Use to test blood sugar 2 times daily or as directed. 1 Box 11     Cholecalciferol (VITAMIN D) 1000 UNITS capsule 2,000 Units  30 capsule      cyanocolbalamin (VITAMIN  B-12) 1000 MCG tablet Take 1 tablet by mouth daily. (Patient taking differently: Take 1,000 mcg by mouth every other day ) 30 tablet 0     cycloSPORINE modified (GENERIC EQUIVALENT) 25 MG capsule TAKE 5 CAPSULES (125MG) BY MOUTH TWO TIMES A  capsule 6     cycloSPORINE modified (GENERIC EQUIVALENT) 25 MG capsule Take 5 capsules (125 mg) by mouth 2 times daily 300 capsule 6     econazole nitrate 1 % cream Apply topically daily 85 g 3     fluticasone (FLONASE) 50 MCG/ACT spray Spray 1 spray into both nostrils daily 1 Bottle 0     furosemide (LASIX) 20 MG tablet Take 1 tablet (20 mg) by mouth daily 90 tablet 3     latanoprost (XALATAN) 0.005 % ophthalmic solution Place 1 drop into both eyes At Bedtime 7.5 mL 2     metFORMIN (GLUCOPHAGE-XR) 500 MG 24 hr tablet Take 3 tablets (1,500 mg) by mouth daily (with dinner) 90 tablet 11     mycophenolate (GENERIC EQUIVALENT) 250 MG capsule Take 4 capsules (1,000 mg) by mouth 2 times daily 240 capsule 11     omeprazole (PRILOSEC) 40 MG capsule Take 1 capsule (40 mg) by mouth daily 90 capsule 3     ondansetron (ZOFRAN-ODT) 4 MG ODT tab Take 1 tablet (4 mg) by mouth every 6 hours  as needed 20 tablet 3     order for DME Walker with front wheels and a seat. 1 Units 0     Polyvinyl Alcohol-Povidone (REFRESH OP) Apply to eye as needed Both eyes       prazosin (MINIPRESS) 5 MG capsule Take 3 capsules (15 mg) by mouth At Bedtime 90 capsule 3     simvastatin (ZOCOR) 20 MG tablet Take 1 tablet (20 mg) by mouth At Bedtime 90 tablet 3     sulfamethoxazole-trimethoprim (BACTRIM/SEPTRA) 400-80 MG per tablet Take 1 tablet by mouth daily 90 tablet 3     topiramate (TOPAMAX) 200 MG tablet Take 1 tablet (200 mg) by mouth 2 times daily 60 tablet 5     Vaginal Lubricant (REPLENS) GEL Use vaginally as needed. Can use up to 3 times per week. 35 g 11     vilazodone (VIIBRYD) 40 MG TABS tablet Take 1 tablet (40 mg) by mouth daily 30 tablet 3       Social History   Substance Use Topics     Smoking status: Former Smoker     Smokeless tobacco: Never Used     Alcohol use No       RADHA Domínguez  10/18/2018  10:26 AM

## 2018-10-18 NOTE — PATIENT INSTRUCTIONS
Take the antibiotic as directed.  If you are not feeling well you can call the on call urology resident [phone] otherwise you will be contacted Monday with the results    Please do the CT scan and then return to see us with your bladder diary    It was a pleasure meeting with you today.  Thank you for allowing me and my team the privilege of caring for you today.  YOU are the reason we are here, and I truly hope we provided you with the excellent service you deserve.  Please let us know if there is anything else we can do for you so that we can be sure you are leaving completely satisfied with your care experience.

## 2018-10-18 NOTE — MR AVS SNAPSHOT
After Visit Summary   10/18/2018    Elizabeth Palma    MRN: [id number]           Patient Information     Date Of Birth          1955        Visit Information        Provider Department      10/18/2018 10:30 AM Caren Calderon MD [facility] Urology and [facility] for Prostate and Urologic Cancers        Today's Diagnoses     Urinary urgency    -  1    Abnormal urinalysis        History of nephrolithiasis        History of gastric bypass        -donor kidney transplant          Care Instructions    Take the antibiotic as directed.  If you are not feeling well you can call the on call urology resident [phone] otherwise you will be contacted Monday with the results    Please do the CT scan and then return to see us with your bladder diary    It was a pleasure meeting with you today.  Thank you for allowing me and my team the privilege of caring for you today.  YOU are the reason we are here, and I truly hope we provided you with the excellent service you deserve.  Please let us know if there is anything else we can do for you so that we can be sure you are leaving completely satisfied with your care experience.            Follow-ups after your visit        Your next 10 appointments already scheduled     Oct 30, 2018  9:30 AM CDT   (Arrive by 9:15 AM)   Return Bariatric Nutrition Visit with Carrie Chawla RD   [facility] Surgical Weight Management ([facility] [place])    [address] [place] [address]  4th [facility] [id number] [phone]            2018 12:00 PM CST   DX HIP/PELVIS/SPINE with UCDX1   [facility] Imaging Center Dexa ([facility] [place])    [address] [place] [address]  1st [facility] [address] [phone]           How do I prepare for my exam? (Food and drink instructions) No Food and Drink Restrictions.  How do I prepare for my exam? (Other instructions) Please do not take any of the following 24 hours prior to the day  of your exam: vitamins, calcium tablets, antacids.  What should I wear: If possible, please wear clothes without metal (snaps, zippers). A sweat suit works well.  How long does the exam take: The exam takes about 20 minutes.  What should I bring: Bring a list of your current medicines to your exam (including vitamins, minerals and over-the-counter drugs).  Do I need a :  No  is needed.  What should I do after the exam: No restrictions, You may resume normal activities.  How do I prepare for my exam? (Food and drink instructions) A DEXA scan is a bone-density scan. It uses a low level of radiation to check the strength of your bones. As you lie on a padded table, a machine will take X-rays. We most often scan the hips and lower spine.  Who should I call with questions: If you have any questions, please call the Imaging Department where you will have your exam. Directions, parking instructions, and other information is available on our website, "[email or web address].            Nov 05, 2018  1:00 PM CST   (Arrive by 12:45 PM)   RETURN ENDOCRINE with Lizbet Byers MD   [facility] Endocrinology ([facility])    [address] [place] [address] [phone]            Nov 05, 2018  2:00 PM CST   CT ABDOMEN PELVIS W/O CONTRAST with UCCT1   [facility] CT ([facility])    [address] [place], [place] [id number] [phone]           How do I prepare for my exam? (Food and drink instructions) To prepare: Do not eat or drink for 2 hours before your exam. If you need to take medicine, you may take it with small sips of water. (We may ask you to take liquid medicine as well.)  How do I prepare for my exam? (Other instructions) Please arrive 30 minutes early for your CT.  Once in the department you might be asked to drink water 15-20 minutes prior to your exam.  If indicated you may be asked to  drink an oral contrast in advance of your CT.  If this is the case, the imaging team will let you know or be in contact with you prior to your appointment  Patients over 70 or patients with diabetes or kidney problems: If you haven t had a blood test (creatinine test) within the last 30 days, the Cardiologist/Radiologist may require you to get this test prior to your exam.  If you have diabetes:  Continue to take your metformin medication on the day of your exam  What should I wear: Please wear loose clothing, such as a sweat suit or jogging clothes. Avoid snaps, zippers and other metal. We may ask you to undress and put on a hospital gown.  How long does the exam take: Most scans take less than 20 minutes.  What should I bring: Please bring any scans or X-rays taken at other hospitals, if similar tests were done. Also bring a list of your medicines, including vitamins, minerals and over-the-counter drugs. It is safest to leave personal items at home.  Do I need a : No  is needed.  What do I need to tell my doctor? Be sure to tell your doctor: * If you have any allergies. * If there s any chance you are pregnant. * If you are breastfeeding.  What should I do after the exam: No restrictions, You may resume normal activities.  What is this test: A CT (computed tomography) scan is a series of pictures that allows us to look inside your body. The scanner creates images of the body in cross sections, much like slices of bread. This helps us see any problems more clearly. You may receive contrast (X-ray dye) before or during your scan. You will be asked to drink the contrast.  Who should I call with questions: If you have any questions, please call the Imaging Department where you will have your exam. Directions, parking instructions, and other information is available on our website, [email or web address].            Nov 06, 2018  1:00 PM CST   Adult Med Follow UP with Chaparrita Hatch MD   [facility]  Psychiatry ([facility])    [address]  [facility] [address]   [phone]            Nov 12, 2018 10:30 AM CST   (Arrive by 10:15 AM)   Return Visit with Prakash Irene MD   [facility] Medical Weight Management ([facility])    [address] [address]   [phone]            Nov 27, 2018  9:30 AM CST   (Arrive by 9:15 AM)   NUTRITION VISIT with Leyla Guerrero RD   [facility] Surgical Weight Management ([facility])    [address] [address]   [phone]            Nov 29, 2018 10:15 AM CST   (Arrive by 10:00 AM)   Return Visit with Caren Calderon MD   [facility] Urology and Inst for Prostate and Urologic Cancers ([facility])    [address] [place] [address] [phone]            Dec 24, 2018  9:30 AM CST   (Arrive by 9:15 AM)   Return Nutrition with Maisha Henderson RD   [facility] Gastroenterology and IBD Clinic ([facility])    [address] [address] [phone]            Jan 14, 2019  1:00 PM CST   (Arrive by 12:45 PM)   RETURN FOOT/ANKLE with Javier Tuttle DPM   [facility] Orthopaedic Clinic ([facility])    [address] [place] [address] [phone]              Future tests that were ordered for you today     Open Future Orders        Priority Expected Expires Ordered    CT Abdomen pelvis w/o contrast Routine  10/18/2019 10/18/2018            Who to contact     Please call your clinic at [phone] to:    Ask questions about your health    Make or cancel appointments    Discuss your medicines    Learn about your test results    Speak to your doctor            Additional Information About Your Visit        MyChart Information     [employer] gives you secure access to  "your electronic health record. If you see a primary care provider, you can also send messages to your care team and make appointments. If you have questions, please call your primary care clinic.  If you do not have a primary care provider, please call [phone] and they will assist you.      [employer] is an electronic gateway that provides easy, online access to your medical records. With [employer], you can request a clinic appointment, read your test results, renew a prescription or communicate with your care team.     To access your existing account, please contact your [facility] or call [phone] for assistance.        Care EveryWhere ID     This is your Care EveryWhere ID. This could be used by other organizations to access your [place] medical records  [id number]        Your Vitals Were     Pulse Height BMI (Body Mass Index)             64 1.549 m (5' 1\") 28.15 kg/m2          Blood Pressure from Last 3 Encounters:   10/18/18 125/71   10/16/18 99/64   10/01/18 123/76    Weight from Last 3 Encounters:   10/18/18 67.6 kg (149 lb)   10/16/18 67.7 kg (149 lb 4.8 oz)   09/25/18 67.9 kg (149 lb 12.8 oz)              We Performed the Following     POST-VOID RESIDUAL BLADDER SCAN     Routine UA with microscopic - No culture     Urinalysis chemical screen POCT     Urine Culture Aerobic Bacterial          Today's Medication Changes          These changes are accurate as of 10/18/18 11:42 AM.  If you have any questions, ask your nurse or doctor.               Start taking these medicines.        Dose/Directions    cephALEXin 500 MG capsule   Commonly known as:  KEFLEX   Used for:  Abnormal urinalysis   Started by:  Caren Calderon MD        Dose:  500 mg   Take 1 capsule (500 mg) by mouth 2 times daily for 5 days   Quantity:  10 capsule   Refills:  0         These medicines have changed or have updated prescriptions.        Dose/Directions    cyanocobalamin 1000 MCG tablet "   Commonly known as:  vitamin  B-12   This may have changed:  when to take this   Used for:  CKD (chronic kidney disease) stage 5, GFR less than 15 ml/min (H)        Dose:  1000 mcg   Take 1 tablet by mouth daily.   Quantity:  30 tablet   Refills:  0            Where to get your medicines      These medications were sent to [facility] - [place], [place] - [address] AT [facility] [address]  [address], [place] [address]     Phone:  [phone]     cephALEXin 500 MG capsule                Primary Care Provider Office Phone # Fax #    Horacio Sheridan [phone]       [address] [place] [address]  [facility] [address]        Equal Access to Services     LINNEA HIDALGO : Hadii riky ku hadasho Soomaali, waaxda luqadaha, qaybta kaalmada adeegyada, waxyoon prattin saba fam. So [facility] [phone].    ATENCIÓN: Si habla español, tiene a goetz disposición servicios gratuitos de asistencia lingüística. [facility] [phone].    We comply with applicable federal civil rights laws and [place] laws. We do not discriminate on the basis of race, color, national origin, age, disability, sex, sexual orientation, or gender identity.            Thank you!     Thank you for choosing [facility] UROLOGY AND [facility] FOR PROSTATE AND UROLOGIC CANCERS  for your care. Our goal is always to provide you with excellent care. Hearing back from our patients is one way we can continue to improve our services. Please take a few minutes to complete the written survey that you may receive in the mail after your visit with us. Thank you!             Your Updated Medication List - Protect others around you: Learn how to safely use, store and throw away your medicines at www.disposemymeds.org.          This list is accurate as of 10/18/18 11:42 AM.  Always use your most recent med list.                   Brand Name Dispense Instructions for use Diagnosis    acetylcysteine 600 MG Caps capsule    N-ACETYL CYSTEINE     30 capsule    Take 2 400 mg caps two times daily for total daily dose of 800 mg        albuterol 108 (90 Base) MCG/ACT inhaler    PROAIR HFA/PROVENTIL HFA/VENTOLIN HFA    1 Inhaler    Inhale 2 puffs into the lungs every 6 hours as needed for shortness of breath / dyspnea or wheezing    Exercise-induced asthma       ARIPiprazole 2 MG tablet    ABILIFY    30 tablet    Take 1 tablet (2 mg) by mouth daily    Major depressive disorder, recurrent episode, moderate (H)       aspirin 81 MG EC tablet     30 tablet    Take 1 tablet (81 mg) by mouth daily    Kidney replaced by transplant       blood glucose monitoring lancets     1 Box    Use to test blood sugar 2 times daily or as directed.    Type 2 diabetes mellitus with peripheral neuropathy (H)       * blood glucose monitoring meter device kit    no brand specified    1 kit    Use to test blood sugar 2 times daily or as directed.    Type 2 diabetes mellitus with peripheral neuropathy (H)       * blood glucose monitoring meter device kit           blood glucose monitoring test strip    no brand specified    100 strip    Use to test blood sugars 2 times daily or as directed    Type 2 diabetes mellitus with peripheral neuropathy (H)       cephALEXin 500 MG capsule    KEFLEX    10 capsule    Take 1 capsule (500 mg) by mouth 2 times daily for 5 days    Abnormal urinalysis       cyanocobalamin 1000 MCG tablet    vitamin  B-12    30 tablet    Take 1 tablet by mouth daily.    CKD (chronic kidney disease) stage 5, GFR less than 15 ml/min (H)       * cycloSPORINE modified 25 MG capsule    GENERIC EQUIVALENT    300 capsule    Take 5 capsules (125 mg) by mouth 2 times daily    Kidney replaced by transplant       * cycloSPORINE modified 25 MG capsule    GENERIC EQUIVALENT    300 capsule    TAKE 5 CAPSULES (125MG) BY MOUTH TWO TIMES A DAY    Kidney replaced by transplant       econazole nitrate 1 % cream     85 g    Apply topically daily    Type II or unspecified type diabetes  mellitus with neurological manifestations, not stated as uncontrolled(250.60) (H), Dermatophytosis of foot       fluticasone 50 MCG/ACT spray    FLONASE    1 Bottle    Spray 1 spray into both nostrils daily    Seasonal allergic rhinitis, unspecified chronicity, unspecified trigger       furosemide 20 MG tablet    LASIX    90 tablet    Take 1 tablet (20 mg) by mouth daily    Generalized edema       latanoprost 0.005 % ophthalmic solution    XALATAN    7.5 mL    Place 1 drop into both eyes At Bedtime    Mild stage glaucoma(365.71)       metFORMIN 500 MG 24 hr tablet    GLUCOPHAGE-XR    90 tablet    Take 3 tablets (1,500 mg) by mouth daily (with dinner)    Type 2 diabetes mellitus with diabetic polyneuropathy, without long-term current use of insulin (H)       mycophenolate 250 MG capsule    GENERIC EQUIVALENT    240 capsule    Take 4 capsules (1,000 mg) by mouth 2 times daily    Kidney transplanted       omeprazole 40 MG capsule    priLOSEC    90 capsule    Take 1 capsule (40 mg) by mouth daily    Gastroesophageal reflux disease without esophagitis       ondansetron 4 MG ODT tab    ZOFRAN-ODT    20 tablet    Take 1 tablet (4 mg) by mouth every 6 hours as needed    Chronic kidney disease, stage V (H)       order for DME     1 Units    Walker with front wheels and a seat.    Fall, initial encounter       prazosin 5 MG capsule    MINIPRESS    90 capsule    Take 3 capsules (15 mg) by mouth At Bedtime    Nightmares associated with chronic post-traumatic stress disorder       REFRESH OP      Apply to eye as needed Both eyes        replens Gel     35 g    Use vaginally as needed. Can use up to 3 times per week.    Vaginal dryness       simvastatin 20 MG tablet    ZOCOR    90 tablet    Take 1 tablet (20 mg) by mouth At Bedtime    Chronic kidney disease, stage V (H)       sulfamethoxazole-trimethoprim 400-80 MG per tablet    BACTRIM/SEPTRA    90 tablet    Take 1 tablet by mouth daily    Immunosuppression (H)       topiramate  200 MG tablet    TOPAMAX    60 tablet    Take 1 tablet (200 mg) by mouth 2 times daily    Morbid obesity due to excess calories (H)       TYLENOL 325 MG tablet   Generic drug:  acetaminophen      Take 325-650 mg by mouth as needed        vilazodone 40 MG Tabs tablet    VIIBRYD    30 tablet    Take 1 tablet (40 mg) by mouth daily    Major depressive disorder, recurrent episode, moderate (H)       vitamin D 1000 units capsule     30 capsule    2,000 Units        * Notice:  This list has 4 medication(s) that are the same as other medications prescribed for you. Read the directions carefully, and ask your doctor or other care provider to review them with you.

## 2018-10-18 NOTE — LETTER
10/18/2018       RE: Elizabeth Palma  [address]  [facility] [address]     Dear Colleague,    Thank you for referring your patient, Elizabeth Palma, to the [facility] UROLOGY AND INST FOR PROSTATE AND UROLOGIC CANCERS at [facility]. Please see a copy of my visit note below.    2018    CC: Urinary Urgency     HPI:  Elizabeth Palma is a 63 year old female asked to be seen in consultation by  for the above.  This problem has been going on for 3 months and has been getting worse.  It is associated with urge incontinence.  She has episodes of incontinence per day and has been using 1 pads per day.  She has  had no previous treatment for her condition.  The patient voids q 3-4 hours, nocturia X 1-2.  She denies hematuria or incomplete bladder emptying. Patient states that she has never had a UTIs. Patient drinks 16 oz H20 x 4.     The patient has no constipation or splinting- had daily BMs.     Obstetric Hx:  She is . . Last menstrual period was at 35 s/p hysterectomy.     Past Medical History:   Diagnosis Date     Abnormal MRI, cervical spine 10/15/2011    2011; mild changes noted. Study done for left arm symptoms Impression:  1. Mild multilevel degenerative disc disease with no significant canal or neural stenosis seen. motion artifact on the STIR images in these are not interpretable. The remaining images were interpreted      Autosomal dominant polycystic kidney disease 2011     (Problem list name updated by automated process. Provider to review and confirm.)     CMC DJD(carpometacarpal degenerative joint disease), localized primary 3/5/2013     -donor kidney transplant 3/20/2014     Depressive disorder 11/15/2012     DM type 2 (diabetes mellitus, type 2) (H) 2013     Encounter for long-term (current) use of other medications 2015     Family history of tremor 10/17/2011     Generalized anxiety disorder  "11/15/2012     Glaucoma      Hyperlipidemia 10/15/2011     Hyperparathyroidism, secondary (H) 2/25/2015     Hypertension     resolved     Immunosuppressed status (H) 3/20/2014     Major depressive disorder, recurrent episode, moderate (H) 11/15/2012     Obesity (BMI 30-39.9)      OP (osteoporosis) T score -3.8 9/21/2009 2007 T-score -3.7      LION (obstructive sleep apnoea) 10/15/2012    intol to cpa     Pain in joint, forearm -- L unhealed Fx 5/21/2013     Premature menopause age 37 7/10/2012    OCP (vaginal bldg)-->HT which she stopped 2 mo later documented at Jan 12, 2007 visit (age 51).      Restless leg syndrome      Rib fractures 4/13/2013     Sensory loss 10/17/2011    Bottom of feet; uncertain if there is a neuropathy per notes.       Stiffness of joint, not elsewhere classified, hand 3/5/2013     Tremor 10/15/2011    head     Uncomplicated asthma        Past Surgical History:   Procedure Laterality Date     ABDOMEN SURGERY       ANKLE SURGERY       C TRANSPLANTATION OF KIDNEY  3/2014     C/SECTION, LOW TRANSVERSE      x 2     CHOLECYSTECTOMY  1990     COLONOSCOPY       ESOPHAGOSCOPY, GASTROSCOPY, DUODENOSCOPY (EGD), COMBINED N/A 5/19/2015    Procedure: COMBINED ESOPHAGOSCOPY, GASTROSCOPY, DUODENOSCOPY (EGD);  Surgeon: Sky Davey MD;  Location:  GI     ESOPHAGOSCOPY, GASTROSCOPY, DUODENOSCOPY (EGD), COMBINED N/A 5/19/2015    Procedure: COMBINED ESOPHAGOSCOPY, GASTROSCOPY, DUODENOSCOPY (EGD), BIOPSY SINGLE OR MULTIPLE;  Surgeon: Sky Davey MD;  Location:  GI     EYE SURGERY       LAPAROSCOPY, SURGICAL; REPAIR INCISIONAL OR VENTRAL HERNIA       ORTHOPEDIC SURGERY       HERMINIA EN Y BOWEL  1990     WRIST SURGERY         Meds, Allergies, FHx and SHx reviewed per nurse's intake note.    PEx:   Blood pressure 125/71, pulse 64, height 1.549 m (5' 1\"), weight 67.6 kg (149 lb), not currently breastfeeding.  5' 1\", Body mass index is 28.15 kg/(m^2)., 149 lbs 0 oz  Gen appearance:  Well " groomed  HEENT:  EOMI, AT NC  Psych:  Normal Affect  Neuro:  A/O X 3  Skin:  Warm to touch  Resp:  No increased respiratory effort  Vasc:  RRR  lymph:  No LE edema  Abd:  Soft/NT, ND, no palpable masses  Musk:  Full ROM in extremities    RU: 0 on bladder scan by nursing.    Dipstick: positive nitrites     ASSESSMENT and PLAN:  This is a 63 year old female with history of bilateral PCKD s/p renal transplant, history of gastric bypass, history of nephrolithiasis, acute onset urinary urgency. Urine dip today shows positive nitrites.     - Will order UA/UC.   - Keflex 500 mg  BID empirically as she is immunocompromised  - Will order CT Abd/pelvis w/o contrast to evaluate for stone in kidneys given that she has a history of stones and gastric bypass  -Voiding diary to assess for modifiable factors  - Patient to call clinic if she her symptoms worsen     Thank you for allowing me to participate in Ms. Palma's care.  I will keep you updated on her progress.    Patient discussed and examined with Dr. Kvng North MD   Urology Resident PGY-4    Addendum:    The patient was seen and evaluated with the resident.  The plan was formulated in conjunction with me and I agree with the above note with changes made as necessary.    Will plan to treat what seems like a UTI and assess for stones as nidus for UTI.  She will return with her voiding diary after the CT scan    30 minutes were spent with patient today, >50% in counseling and coordination of care    Caren Calderon MD MPH   of Urology    CC  Patient Care Team:  Horacio Sheridan MD as PCP - General (Internal Medicine)  Javier Tuttle DPM (Podiatry)  Sky Davey MD as MD (Surgery)  Fina Garcia MD as MD (INTERNAL MEDICINE - ENDOCRINOLOGY, DIABETES & METABOLISM)  AUSTIN Hudson MD as MD (Dermatology)  Chaparrita Hatch MD as MD (Psychiatry)  Prakash Irene MD as MD (INTERNAL MEDICINE -  ENDOCRINOLOGY, DIABETES & METABOLISM)  Dex Garza MD as MD (Transplant)  Yonas Underwood MD as MD (Ophthalmology)  Gerber Dickerson MD as MD (Neurology)  Stiven Painting MD as MD (Neurology)  Yobani Hammonds MD as MD (Orthopedics)  Lizbet Byers MD as MD (INTERNAL MEDICINE - ENDOCRINOLOGY, DIABETES & METABOLISM)  Christian Jimenez MD as Hospitalist (Internal Medicine)  Era Gonzalez, PhD LP ([facility])  Param Salas APRN CNBHAVNA as Midwife (Midwives)  Patito Rush MD as MD (Dermatology)  Caren Calderon MD as MD (Urology)  RADHA SANDS

## 2018-10-18 NOTE — TELEPHONE ENCOUNTER
Cyclosporine Modified 25MG  Qty 300  Last Fill 09/25/2018    Thank you  Janneth Colindres [facility]

## 2018-10-20 LAB
BACTERIA SPEC CULT: ABNORMAL
Lab: ABNORMAL
SPECIMEN SOURCE: ABNORMAL

## 2018-10-29 ASSESSMENT — ENCOUNTER SYMPTOMS
DYSURIA: 1
NERVOUS/ANXIOUS: 1
DECREASED CONCENTRATION: 1
HALLUCINATIONS: 0
FEVER: 0
INSOMNIA: 1
POLYPHAGIA: 0
FLANK PAIN: 0
DECREASED LIBIDO: 1
HEMATURIA: 1
DIFFICULTY URINATING: 0
WEIGHT LOSS: 1
FATIGUE: 1
ALTERED TEMPERATURE REGULATION: 0
HOT FLASHES: 0
NIGHT SWEATS: 0
DECREASED APPETITE: 0
INCREASED ENERGY: 1
DEPRESSION: 1
CHILLS: 0
PANIC: 1
POLYDIPSIA: 0

## 2018-10-30 ENCOUNTER — OFFICE VISIT (OUTPATIENT)
Dept: SURGERY | Facility: CLINIC | Age: 63
End: 2018-10-30
Payer: MEDICARE

## 2018-10-30 ENCOUNTER — TELEPHONE (OUTPATIENT)
Dept: TRANSPLANT | Facility: CLINIC | Age: 63
End: 2018-10-30

## 2018-10-30 VITALS — WEIGHT: 145.6 LBS | BODY MASS INDEX: 27.51 KG/M2

## 2018-10-30 DIAGNOSIS — R33.9 INABILITY TO URINATE: ICD-10-CM

## 2018-10-30 DIAGNOSIS — N39.3 FEMALE STRESS INCONTINENCE: ICD-10-CM

## 2018-10-30 DIAGNOSIS — M81.0 AGE-RELATED OSTEOPOROSIS WITHOUT CURRENT PATHOLOGICAL FRACTURE: ICD-10-CM

## 2018-10-30 DIAGNOSIS — Z94.0 KIDNEY TRANSPLANTED: Primary | ICD-10-CM

## 2018-10-30 LAB
ALBUMIN SERPL-MCNC: 3.8 G/DL (ref 3.4–5)
BUN SERPL-MCNC: 16 MG/DL (ref 7–30)
CALCIUM SERPL-MCNC: 9.4 MG/DL (ref 8.5–10.1)
CREAT SERPL-MCNC: 1.12 MG/DL (ref 0.52–1.04)
GFR SERPL CREATININE-BSD FRML MDRD: 49 ML/MIN/1.7M2
PHOSPHATE SERPL-MCNC: 3.6 MG/DL (ref 2.5–4.5)
PTH-INTACT SERPL-MCNC: 85 PG/ML (ref 18–80)
T4 FREE SERPL-MCNC: 0.96 NG/DL (ref 0.76–1.46)
TSH SERPL DL<=0.005 MIU/L-ACNC: 1.74 MU/L (ref 0.4–4)

## 2018-10-30 NOTE — PATIENT INSTRUCTIONS
1. Avoid grazing through, Eat 3 meals with lean protein at each meal, along with a non-starchy vegetable or whole fruit, up to 1/2 c carb at a meal.   -Try hard-boiled eggs to put on your salad or to have later in the day to make up for skipping breakfast.    2. If needing a snack, have vegetables or protein snack (give at least 2 hours between a meals and a snack).  3. Walk 10 min daily, increasing as able.    Next RD appointment 11/27/18.

## 2018-10-30 NOTE — LETTER
"10/30/2018       RE: Elizabeth Palma  [address]  [place] [id number]     Dear Colleague,    Thank you for referring your patient, Elizabeth Palma, to the [facility] SURGICAL WEIGHT MANAGEMENT [facility]. Please see a copy of my visit note below.    Nutrition Reassessment  Reason For Visit:  Elizabeth Palma is a 63 year-old female with type 2 DM (oral med management) presenting today for nutrition follow-up, s/p \"gastric bypass in 1990 at [facility]\" per Pt report.  She is seeing medical weight management.  She was referred by Dr. Irene.  Note: Pt had a kidney transplant on March 20, 2014.    Pt was accompanied by her .     Anthropometrics:  Height: 61\"  Pre-op Weight: 265 lbs per Pt report; lowest weight after bariatric surgery: 165-170 lbs (25 years ago)  (Pt reported that she initially was at 215 lbs in 2015 prior to seeing writer.)    Current Weight: 145.6 lbs (-3.4 lbs over the past month) with BMI of 27.5 kg/m^2.    Current Vitamins/Minerals: 3000 International Units vitamin D/day, Calcium 2x daily, vitamin C, vitamin B12 daily, B-complex  *Pt stated that she was told not to take other vitamins/minerals by MD.    Nutrition History:  Lactose intolerance ever since bariatric surgery.  Pt stated that she has osteoporosis; however, she stated that her doctors don't want her to take any vitamins or minerals due to her kidney transplant.    Diet/Nutrition Intake Hx: Pt states that she has been consuming 3 meals daily but will eat very small portions and will occasionally have a snack but she states she has stopped consuming so many potato chips. Pt also states her intake is affected by nausea 2/2 her anti-rejection medications. She reports that last night she had an episode of emesis after consuming her lasagna. However pt reports she tries to make healthy choices (lower in calorie). Pt is also limited in protein choices that she likes - pt reports " she does not take much dairy, does not like beans and lentils, keeps kosher. Advised pt to try to time her meals and eat every 4 hours instead of grazing but pt refused stating she would rather not eat at all.      *Per previous RD note: Pt stated that she will not record food intake due to the fact that every time she does this, she simply does not eat as she doesn't want to record.  She stated that her therapist strongly advises against recording food intake for this reason.    Physical Activity Note: Pt reports she has a tendency to break bones so she is limited with what exercises she does. Pt states there is a long hallway in the building that she needs to walk when going out. Pt states she does not walk for exercises but walks daily down hallways. She notes that she is typically tired, reporting that sometimes when she is bored she will fall asleep.     Progress with Previous Goals:    1. Avoid grazing through, Eat 3 meals with lean protein at each meal, along with a non-starchy vegetable or whole fruit, up to 1/2 c carb at a meal. -Continues, 3 meals daily still has multiple snacks   2. If needing a snack, have vegetables (give at least 2 hours between a meals and a snack). -Not Met, she reports consuming apples or protein bars,  a few potatoe chips, 100% fruits cups, bread and butter.  Dicussed if having a snack to have something with protein in it, such as nut butter, hummus, a chicken wing.   3. Walk 10 min daily, increasing as able. - Met/Continues: she is walking a little bit at a time throughout the day. She will walk down a long hallway, walks to car and classroom.       Nutrition Prescription:  Grams Protein: 60 (minimum) - Not meeting consistently.   Amount of Fluid: 48-64 oz    Nutrition Diagnosis  Previous: Overweight related to history of excessive energy intake as evidenced by BMI > 25. - continues    Intervention  Intervention At Appointment:  Materials/Education provided:  Discussed importance  of optimizing protein intake and options available; she states that she will occassionally have a few bites of the protein bars (bought from clinic).  Encouraged patient to try and include more eggs, other sources of protein throughout the week and continue to consume regular protein sources.  Reviewed previous goals.  Gave encouragement and support.    *Note: Purchased the protein chips from clinic today. RD asked if more protein bars were needed and she stated no she does not need more protein bars at this time.       Patient Understanding: good  Expected Compliance: good with continued RD follow up.    Goals:   1. Avoid grazing through, Eat 3 meals with lean protein at each meal, along with a non-starchy vegetable or whole fruit, up to 1/2 c carb at a meal.   -Try hard-boiled eggs to put on your salad or to have later in the day to make up for skipping breakfast.    2. If needing a snack, have vegetables or protein snack (give at least 2 hours between a meals and a snack).  3. Walk 10 min daily, increasing as able.    Follow-Up: 1 month or PRN    Time spent with patient: 30 minutes.    Again, thank you for allowing me to participate in the care of your patient.      Sincerely,    Carrie Chawla RD

## 2018-10-30 NOTE — TELEPHONE ENCOUNTER
ISSUE:  Creatinine 1.12 up from pt baseline 0.8-1  Appears pt recently had a UTI 10/18/18    OUTCOME:   Call placed to pt regarding her lab results.  Pt verbalized she has been hydrating well.  Pt verbalized she just completed her round of abx for her UTI. Pt will f/u with Dr Calderon to see if another UA/UC to be done to ensure UTI cleared.  Pt will cont hydrating well and will repeat BMP in 1-2 weeks.     Pt questions answered, pt v/u.

## 2018-10-30 NOTE — MR AVS SNAPSHOT
MRN:[id number]                      After Visit Summary   10/30/2018    Elizabeth Palma    MRN: [id number]           Visit Information        Provider Department      10/30/2018 9:30 AM Carrie Chawla RD [facility] Surgical Weight Management        Your next 10 appointments already scheduled     Nov 05, 2018 12:00 PM CST   DX HIP/PELVIS/SPINE with UCDX1   [facility] ([place])    [address] [place] [address] [phone]           How do I prepare for my exam? (Food and drink instructions) No Food and Drink Restrictions.  How do I prepare for my exam? (Other instructions) Please do not take any of the following 24 hours prior to the day of your exam: vitamins, calcium tablets, antacids.  What should I wear: If possible, please wear clothes without metal (snaps, zippers). A sweat suit works well.  How long does the exam take: The exam takes about 20 minutes.  What should I bring: Bring a list of your current medicines to your exam (including vitamins, minerals and over-the-counter drugs).  Do I need a :  No  is needed.  What should I do after the exam: No restrictions, You may resume normal activities.  How do I prepare for my exam? (Food and drink instructions) A DEXA scan is a bone-density scan. It uses a low level of radiation to check the strength of your bones. As you lie on a padded table, a machine will take X-rays. We most often scan the hips and lower spine.  Who should I call with questions: If you have any questions, please call the Imaging Department where you will have your exam. Directions, parking instructions, and other information is available on our website, [email or web address].            Nov 05, 2018  1:00 PM CST   (Arrive by 12:45 PM)   RETURN ENDOCRINE with Lizbet Byers MD   [facility] Endocrinology ([place])    [address] [place] [address]  [facility] [address] [phone]            Nov 05, 2018  2:00 PM CST   CT ABDOMEN PELVIS W/O CONTRAST with UCCT1   [facility] ([facility])    [address] [facility]  [address] [facility] [address] [phone]           How do I prepare for my exam? (Food and drink instructions) No Food and Drink Restrictions.  How do I prepare for my exam? (Other instructions) You do not need to do anything special to prepare for this exam. For a sinus scan: Use your nose spray (nasal decongestant spray) as directed.  What should I wear: Please wear loose clothing, such as a sweat suit or jogging clothes. Avoid snaps, zippers and other metal. We may ask you to undress and put on a hospital gown.  How long does the exam take: Most scans take less than 20 minutes.  What should I bring: Please bring any scans or X-rays taken at other hospitals, if similar tests were done. Also bring a list of your medicines, including vitamins, minerals and over-the-counter drugs. It is safest to leave personal items at home.  Do I need a : No  is needed.  What do I need to tell my doctor? Be sure to tell your doctor: * If you have any allergies. * If there s any chance you are pregnant. * If you are breastfeeding.  What should I do after the exam: No restrictions, you may resume normal activities.  What is this test: A CT (computed tomography) scan is a series of pictures that allows us to look inside your body. The scanner creates images of the body in cross sections, much like slices of bread. This helps us see any problems more clearly.  Who should I call with questions: If you have any questions, please call the Imaging Department where you will have your exam. Directions, parking instructions, and other information are available on our website, [email or web address].            Nov 06, 2018  1:00 PM CST   Adult Med Follow UP with Chaparrita Hatch MD   [facility] Psychiat[facility]  ([facility])    [address]  [facility] [address]   [phone]            Nov 12, 2018 10:30 AM CST   (Arrive by 10:15 AM)   Return Visit with Prakash Irene MD   [facility] Medical Weight Management ([facility] [place])    [address] [place] [address]   [phone]            Nov 27, 2018  9:30 AM CST   (Arrive by 9:15 AM)   NUTRITION VISIT with Leyla Guerrero RD   [facility] Surgical Weight Management ([facility])    [address] [place] [address]   [phone]            Nov 29, 2018 10:15 AM CST   (Arrive by 10:00 AM)   Return Visit with Caren Calderon MD   [facility] Urology and Inst for Prostate and Urologic Cancers ([facility])    [address] [place] [address]   [phone]            Dec 24, 2018  9:30 AM CST   (Arrive by 9:15 AM)   Return Nutrition with Maisha Henderson RD   [facility] Gastroenterology and IBD Clinic ([facility])    [address] [place] [address] [phone]            Jan 14, 2019  1:00 PM CST   (Arrive by 12:45 PM)   [facility])    [address] [place] [address] [phone]            Jan 28, 2019 10:00 AM CST   (Arrive by 9:45 AM)   Return Visit with Horacio Sheridan MD   [facility] Primary Care Clinic ([facility])    [address] [place] [address] [phone]              Care Instructions    1. Avoid grazing through, Eat 3 meals with lean protein at each meal, along with a non-starchy vegetable or whole fruit, up to 1/2 c carb at a meal.   -Try hard-boiled eggs to put on your salad or to have later in the day to make up for skipping breakfast.    2.  If needing a snack, have vegetables or protein snack (give at least 2 hours between a meals and a snack).  3. Walk 10 min daily, increasing as able.    Next RD appointment 11/27/18.          "[employer] Information     "[employer] gives you secure access to your electronic health record. If you see a primary care provider, you can also send messages to your care team and make appointments. If you have questions, please call your primary care clinic.  If you do not have a primary care provider, please call [phone] and they will assist you.      "[employer]" is an electronic gateway that provides easy, online access to your medical records. With "[employer]", you can request a clinic appointment, read your test results, renew a prescription or communicate with your care team.     To access your existing account, please contact your [facility] or call [phone] for assistance.        Care EveryWhere ID     This is your Care EveryWhere ID. This could be used by other organizations to access your [place] medical records  [id number]        Equal Access to Services     LINNEA HIDALGO : Hadii aad ku hadasho Somartín, waaxda luqadaha, qaybta kaalmada adeelizabeth, ty fam. So [facility] [place] [phone].    ATENCIÓN: Si habla español, tiene a goetz disposición servicios gratuitos de asistencia lingüística. Llame al [phone].    We comply with applicable federal civil rights laws and [place] laws. We do not discriminate on the basis of race, color, national origin, age, disability, sex, sexual orientation, or gender identity.

## 2018-10-30 NOTE — PROGRESS NOTES
"Nutrition Reassessment  Reason For Visit:  Elizabeth Palma is a 63 year-old female with type 2 DM (oral med management) presenting today for nutrition follow-up, s/p \"gastric bypass in 1990 at [facility]\" per Pt report.  She is seeing medical weight management.  She was referred by Dr. Irene.  Note: Pt had a kidney transplant on March 20, 2014.    Pt was accompanied by her .     Anthropometrics:  Height: 61\"  Pre-op Weight: 265 lbs per Pt report; lowest weight after bariatric surgery: 165-170 lbs (25 years ago)  (Pt reported that she initially was at 215 lbs in 2015 prior to seeing writer.)    Current Weight: 145.6 lbs (-3.4 lbs over the past month) with BMI of 27.5 kg/m^2.    Current Vitamins/Minerals: 3000 International Units vitamin D/day, Calcium 2x daily, vitamin C, vitamin B12 daily, B-complex  *Pt stated that she was told not to take other vitamins/minerals by MD.    Nutrition History:  Lactose intolerance ever since bariatric surgery.  Pt stated that she has osteoporosis; however, she stated that her doctors don't want her to take any vitamins or minerals due to her kidney transplant.    Diet/Nutrition Intake Hx: Pt states that she has been consuming 3 meals daily but will eat very small portions and will occasionally have a snack but she states she has stopped consuming so many potato chips. Pt also states her intake is affected by nausea 2/2 her anti-rejection medications. She reports that last night she had an episode of emesis after consuming her lasagna. However pt reports she tries to make healthy choices (lower in calorie). Pt is also limited in protein choices that she likes - pt reports she does not take much dairy, does not like beans and lentils, keeps kosher. Advised pt to try to time her meals and eat every 4 hours instead of grazing but pt refused stating she would rather not eat at all.      *Per previous RD note: Pt stated that she will not record food intake due to the fact that " every time she does this, she simply does not eat as she doesn't want to record.  She stated that her therapist strongly advises against recording food intake for this reason.    Physical Activity Note: Pt reports she has a tendency to break bones so she is limited with what exercises she does. Pt states there is a long hallway in the building that she needs to walk when going out. Pt states she does not walk for exercises but walks daily down hallways. She notes that she is typically tired, reporting that sometimes when she is bored she will fall asleep.     Progress with Previous Goals:    1. Avoid grazing through, Eat 3 meals with lean protein at each meal, along with a non-starchy vegetable or whole fruit, up to 1/2 c carb at a meal. -Continues, 3 meals daily still has multiple snacks   2. If needing a snack, have vegetables (give at least 2 hours between a meals and a snack). -Not Met, she reports consuming apples or protein bars,  a few potatoe chips, 100% fruits cups, bread and butter.  Dicussed if having a snack to have something with protein in it, such as nut butter, hummus, a chicken wing.   3. Walk 10 min daily, increasing as able. - Met/Continues: she is walking a little bit at a time throughout the day. She will walk down a long hallway, walks to car and classroom.       Nutrition Prescription:  Grams Protein: 60 (minimum) - Not meeting consistently.   Amount of Fluid: 48-64 oz    Nutrition Diagnosis  Previous: Overweight related to history of excessive energy intake as evidenced by BMI > 25. - continues    Intervention  Intervention At Appointment:  Materials/Education provided:  Discussed importance of optimizing protein intake and options available; she states that she will occassionally have a few bites of the protein bars (bought from clinic).  Encouraged patient to try and include more eggs, other sources of protein throughout the week and continue to consume regular protein sources.  Reviewed  previous goals.  Gave encouragement and support.    *Note: Purchased the protein chips from clinic today. RD asked if more protein bars were needed and she stated no she does not need more protein bars at this time.       Patient Understanding: good  Expected Compliance: good with continued RD follow up.    Goals:   1. Avoid grazing through, Eat 3 meals with lean protein at each meal, along with a non-starchy vegetable or whole fruit, up to 1/2 c carb at a meal.   -Try hard-boiled eggs to put on your salad or to have later in the day to make up for skipping breakfast.    2. If needing a snack, have vegetables or protein snack (give at least 2 hours between a meals and a snack).  3. Walk 10 min daily, increasing as able.    Follow-Up: 1 month or PRN    Time spent with patient: 30 minutes.  Carrie Chawla, MS, RD, LD

## 2018-10-31 LAB
1,25(OH)2D SERPL-MCNC: 50.6 PG/ML (ref 19.9–79.3)
ALP BONE SERPL-MCNC: 8.9 UG/L

## 2018-11-01 DIAGNOSIS — M81.0 AGE-RELATED OSTEOPOROSIS WITHOUT CURRENT PATHOLOGICAL FRACTURE: ICD-10-CM

## 2018-11-01 LAB
CALCIUM 24H UR-MRATE: 0.24 G/24 H (ref 0.1–0.3)
CALCIUM UR-MCNC: 16 MG/DL
CALCIUM/CREAT UR: 0.32 G/G CR
COLLECT DURATION TIME UR: 24 H
CREAT 24H UR-MRATE: 0.74 G/(24.H) (ref 0.8–1.8)
CREAT UR-MCNC: 50 MG/DL
SPECIMEN VOL UR: 1490 ML

## 2018-11-02 DIAGNOSIS — E11.42 TYPE 2 DIABETES MELLITUS WITH DIABETIC POLYNEUROPATHY, WITHOUT LONG-TERM CURRENT USE OF INSULIN (H): ICD-10-CM

## 2018-11-02 LAB
DEPRECATED CALCIDIOL+CALCIFEROL SERPL-MC: <70 UG/L (ref 20–75)
VITAMIN D2 SERPL-MCNC: <5 UG/L
VITAMIN D3 SERPL-MCNC: 65 UG/L

## 2018-11-03 NOTE — TELEPHONE ENCOUNTER
metFORMIN (GLUCOPHAGE-XR) 500 MG 24 hr tablet      Last Written Prescription Date:  11/10/17  Last Fill Quantity: 90,   # refills: 11  Last Office Visit : 10/16/18  Future Office visit: 1/28/19    Routing  Because:  A1C  90 day to pharmacy .FYI to RN

## 2018-11-05 ENCOUNTER — OFFICE VISIT (OUTPATIENT)
Dept: ENDOCRINOLOGY | Facility: CLINIC | Age: 63
End: 2018-11-05
Payer: MEDICARE

## 2018-11-05 ENCOUNTER — RADIANT APPOINTMENT (OUTPATIENT)
Dept: BONE DENSITY | Facility: CLINIC | Age: 63
End: 2018-11-05
Attending: INTERNAL MEDICINE
Payer: MEDICARE

## 2018-11-05 ENCOUNTER — PATIENT OUTREACH (OUTPATIENT)
Dept: CARE COORDINATION | Facility: CLINIC | Age: 63
End: 2018-11-05

## 2018-11-05 ENCOUNTER — RADIANT APPOINTMENT (OUTPATIENT)
Dept: CT IMAGING | Facility: CLINIC | Age: 63
End: 2018-11-05
Attending: UROLOGY
Payer: MEDICARE

## 2018-11-05 VITALS
DIASTOLIC BLOOD PRESSURE: 75 MMHG | SYSTOLIC BLOOD PRESSURE: 111 MMHG | BODY MASS INDEX: 27.85 KG/M2 | HEIGHT: 61 IN | HEART RATE: 69 BPM | WEIGHT: 147.5 LBS

## 2018-11-05 DIAGNOSIS — Z98.84 HISTORY OF GASTRIC BYPASS: ICD-10-CM

## 2018-11-05 DIAGNOSIS — Z87.442 HISTORY OF NEPHROLITHIASIS: ICD-10-CM

## 2018-11-05 DIAGNOSIS — R82.90 ABNORMAL URINALYSIS: ICD-10-CM

## 2018-11-05 DIAGNOSIS — Z92.29 PERSONAL HISTORY OF OTHER DRUG THERAPY: ICD-10-CM

## 2018-11-05 DIAGNOSIS — M81.0 AGE-RELATED OSTEOPOROSIS WITHOUT CURRENT PATHOLOGICAL FRACTURE: ICD-10-CM

## 2018-11-05 DIAGNOSIS — Z94.0 DECEASED-DONOR KIDNEY TRANSPLANT: ICD-10-CM

## 2018-11-05 DIAGNOSIS — R39.15 URINARY URGENCY: ICD-10-CM

## 2018-11-05 RX ORDER — ZOLEDRONIC ACID 5 MG/100ML
5 INJECTION, SOLUTION INTRAVENOUS ONCE
Status: CANCELLED
Start: 2018-11-05 | End: 2018-11-05

## 2018-11-05 ASSESSMENT — PAIN SCALES - GENERAL: PAINLEVEL: NO PAIN (0)

## 2018-11-05 NOTE — PATIENT INSTRUCTIONS
Continue Reclast infusion (after following up renal labs)   Follow-up in 1 year.   Reduce Calcium pills to 1 tab daily.   Continue fish / salads ( 2 servings a day)     ==========    Daily requirement about 3 servings (roughly 1000 mg a day):     Good dietary calcium can be obtained from following foods:     1. Dairy: Cheese, Yogurt or Milk    2. Sardines    3. Dark leafy greens like spinach, kale, turnips, and dorothea greens    4. Fortified cereals such as Total, Raisin Bran, Corn Flakes     5. Fortified orange juice    6. Soybeans and Fortified soymilk     7. Enriched breads, grains, and waffles

## 2018-11-05 NOTE — MR AVS SNAPSHOT
After Visit Summary   11/5/2018    Elizabeth Palma    MRN: [id number]           Patient Information     Date Of Birth          1955        Visit Information        Provider Department      11/5/2018 1:00 PM Lizbet Byers MD [facility]        Care Instructions    Continue Reclast infusion (after following up renal labs)   Follow-up in 1 year.   Reduce Calcium pills to 1 tab daily.   Continue fish / salads ( 2 servings a day)     ==========    Daily requirement about 3 servings (roughly 1000 mg a day):     Good dietary calcium can be obtained from following foods:     1. Dairy: Cheese, Yogurt or Milk    2. Sardines    3. Dark leafy greens like spinach, kale, turnips, and dorothea greens    4. Fortified cereals such as Total, Raisin Bran, Corn Flakes     5. Fortified orange juice    6. Soybeans and Fortified soymilk     7. Enriched breads, grains, and waffles            Follow-ups after your visit        Follow-up notes from your care team     Return in about 1 year (around 11/5/2019).      Your next 10 appointments already scheduled     Nov 05, 2018  2:00 PM CST   CT ABDOMEN PELVIS W/O CONTRAST with UCCT1   [facility] [place] [facility] ([facility])    [address] [place] [address] [phone]           How do I prepare for my exam? (Food and drink instructions) No Food and Drink Restrictions.  How do I prepare for my exam? (Other instructions) You do not need to do anything special to prepare for this exam. For a sinus scan: Use your nose spray (nasal decongestant spray) as directed.  What should I wear: Please wear loose clothing, such as a sweat suit or jogging clothes. Avoid snaps, zippers and other metal. We may ask you to undress and put on a hospital gown.  How long does the exam take: Most scans take less than 20 minutes.  What should I bring: Please bring any scans or X-rays taken at other hospitals, if  similar tests were done. Also bring a list of your medicines, including vitamins, minerals and over-the-counter drugs. It is safest to leave personal items at home.  Do I need a : No  is needed.  What do I need to tell my doctor? Be sure to tell your doctor: * If you have any allergies. * If there s any chance you are pregnant. * If you are breastfeeding.  What should I do after the exam: No restrictions, you may resume normal activities.  What is this test: A CT (computed tomography) scan is a series of pictures that allows us to look inside your body. The scanner creates images of the body in cross sections, much like slices of bread. This helps us see any problems more clearly.  Who should I call with questions: If you have any questions, please call the Imaging Department where you will have your exam. Directions, parking instructions, and other information are available on our website, [email or web address].            Nov 06, 2018  1:00 PM CST   Adult Med Follow UP with Chaparrita Hatch MD   [facility]iat[facility] ([facility])    [address] [place] [id number]   [phone]            Nov 12, 2018 10:30 AM CST   (Arrive by 10:15 AM)   Return Weight Management Visit with Prakash Irene MD   [facility] Medical Weight Management ([facility])    [address] [place] [address]   [phone]            Nov 27, 2018  9:30 AM CST   (Arrive by 9:15 AM)   Return Weight Management Visit with Leyla Guerrero RD   [facility] Surgical Weight Management ([facility])    [address] [place] [address]   [phone]            Nov 29, 2018 10:15 AM CST   (Arrive by 10:00 AM)   Return Visit with Caren Calderon MD   [facility] for Prostate and Urologic Cancers ([facility])    [address] [place] [address]  [place] [address]   [phone]            Dec 24, 2018  9:30 AM CST   (Arrive by 9:15 AM)   Return Nutrition with Maisha Henderson RD   [facility] ([facility])    [address]  [address] [facility] [address]   [phone]            Jan 14, 2019  1:00 PM CST   (Arrive by 12:45 PM)   [facility]    [address] [place] [address]  [address] [facility] [address]   [phone]            Jan 28, 2019 10:00 AM CST   (Arrive by 9:45 AM)   Return Visit with Horacio Sheridan MD   [facility] ([facility])    [address] [place] [id number] [phone]            Mar 18, 2019  2:05 PM CDT   (Arrive by 1:35 PM)   Return Kidney Transplant with Uc Kidney/Pancreas Recipient 1   [facility]phrology ([facility])    [address] [place] [address]  [address]  [facility] [address] [phone]              Who to contact     Please call your clinic at [phone] to:    Ask questions about your health    Make or cancel appointments    Discuss your medicines    Learn about your test results    Speak to your doctor            Additional Information About Your Visit        [employer] Information     [employer] gives you secure access to your electronic health record. If you see a primary care provider, you can also send messages to your care team and make appointments. If you have questions, please call your primary care clinic.  If you do not have a primary care provider, please call [phone] and they will assist you.      [employer] is an electronic gateway that provides easy, online access to your medical records. With [employer], you can request a clinic appointment, read your test results, renew a prescription or communicate with your care team.     To access your existing account, please  "contact your [facility] or call [phone] for assistance.        Care EveryWhere ID     This is your Care EveryWhere ID. This could be used by other organizations to access your [place] medical records  [id number]        Your Vitals Were     Pulse Height BMI (Body Mass Index)             69 1.55 m (5' 1.02\") 27.85 kg/m2          Blood Pressure from Last 3 Encounters:   11/05/18 111/75   10/18/18 125/71   10/16/18 99/64    Weight from Last 3 Encounters:   11/05/18 66.9 kg (147 lb 8 oz)   10/30/18 66 kg (145 lb 9.6 oz)   10/18/18 67.6 kg (149 lb)              Today, you had the following     No orders found for display         Today's Medication Changes          These changes are accurate as of 11/5/18  1:25 PM.  If you have any questions, ask your nurse or doctor.               These medicines have changed or have updated prescriptions.        Dose/Directions    cyanocobalamin 1000 MCG tablet   Commonly known as:  vitamin  B-12   This may have changed:  when to take this   Used for:  CKD (chronic kidney disease) stage 5, GFR less than 15 ml/min (H)        Dose:  1000 mcg   Take 1 tablet by mouth daily.   Quantity:  30 tablet   Refills:  0                Primary Care Provider Office Phone # Fax #    Horacio Sheridan [phone]       [address] [place] [address]  [facility] [address]        Equal Access to Services     LINNEA HIDALGO AH: Hadii riky muellero Somartín, waaxda luqadaha, qaybta kaalmada jakobyada, ty fam. So [facility] [phone].    ATENCIÓN: Si habla español, tiene a goetz disposición servicios gratuitos de asistencia lingüística. Llame al [phone].    We comply with applicable federal civil rights laws and [place] laws. We do not discriminate on the basis of race, color, national origin, age, disability, sex, sexual orientation, or gender identity.            Thank you!     Thank you for choosing [facility]  for " your care. Our goal is always to provide you with excellent care. Hearing back from our patients is one way we can continue to improve our services. Please take a few minutes to complete the written survey that you may receive in the mail after your visit with us. Thank you!             Your Updated Medication List - Protect others around you: Learn how to safely use, store and throw away your medicines at www.disposemymeds.org.          This list is accurate as of 11/5/18  1:25 PM.  Always use your most recent med list.                   Brand Name Dispense Instructions for use Diagnosis    acetylcysteine 600 MG Caps capsule    N-ACETYL CYSTEINE    30 capsule    Take 2 400 mg caps two times daily for total daily dose of 800 mg        albuterol 108 (90 Base) MCG/ACT inhaler    PROAIR HFA/PROVENTIL HFA/VENTOLIN HFA    1 Inhaler    Inhale 2 puffs into the lungs every 6 hours as needed for shortness of breath / dyspnea or wheezing    Exercise-induced asthma       ARIPiprazole 2 MG tablet    ABILIFY    30 tablet    Take 1 tablet (2 mg) by mouth daily    Major depressive disorder, recurrent episode, moderate (H)       aspirin 81 MG EC tablet     30 tablet    Take 1 tablet (81 mg) by mouth daily    Kidney replaced by transplant       blood glucose monitoring lancets     1 Box    Use to test blood sugar 2 times daily or as directed.    Type 2 diabetes mellitus with peripheral neuropathy (H)       * blood glucose monitoring meter device kit    no brand specified    1 kit    Use to test blood sugar 2 times daily or as directed.    Type 2 diabetes mellitus with peripheral neuropathy (H)       * blood glucose monitoring meter device kit           blood glucose monitoring test strip    no brand specified    100 strip    Use to test blood sugars 2 times daily or as directed    Type 2 diabetes mellitus with peripheral neuropathy (H)       cyanocobalamin 1000 MCG tablet    vitamin  B-12    30 tablet    Take 1 tablet by mouth  daily.    CKD (chronic kidney disease) stage 5, GFR less than 15 ml/min (H)       * cycloSPORINE modified 25 MG capsule    GENERIC EQUIVALENT    300 capsule    Take 5 capsules (125 mg) by mouth 2 times daily    Kidney replaced by transplant       * cycloSPORINE modified 25 MG capsule    GENERIC EQUIVALENT    300 capsule    TAKE 5 CAPSULES (125MG) BY MOUTH TWO TIMES A DAY    Kidney replaced by transplant       econazole nitrate 1 % cream     85 g    Apply topically daily    Type II or unspecified type diabetes mellitus with neurological manifestations, not stated as uncontrolled(250.60) (H), Dermatophytosis of foot       fluticasone 50 MCG/ACT spray    FLONASE    1 Bottle    Spray 1 spray into both nostrils daily    Seasonal allergic rhinitis, unspecified chronicity, unspecified trigger       furosemide 20 MG tablet    LASIX    90 tablet    Take 1 tablet (20 mg) by mouth daily    Generalized edema       latanoprost 0.005 % ophthalmic solution    XALATAN    7.5 mL    Place 1 drop into both eyes At Bedtime    Mild stage glaucoma(365.71)       metFORMIN 500 MG 24 hr tablet    GLUCOPHAGE-XR    90 tablet    Take 3 tablets (1,500 mg) by mouth daily (with dinner)    Type 2 diabetes mellitus with diabetic polyneuropathy, without long-term current use of insulin (H)       mycophenolate 250 MG capsule    GENERIC EQUIVALENT    240 capsule    Take 4 capsules (1,000 mg) by mouth 2 times daily    Kidney transplanted       omeprazole 40 MG capsule    priLOSEC    90 capsule    Take 1 capsule (40 mg) by mouth daily    Gastroesophageal reflux disease without esophagitis       ondansetron 4 MG ODT tab    ZOFRAN-ODT    20 tablet    Take 1 tablet (4 mg) by mouth every 6 hours as needed    Chronic kidney disease, stage V (H)       order for DME     1 Units    Walker with front wheels and a seat.    Fall, initial encounter       prazosin 5 MG capsule    MINIPRESS    90 capsule    Take 3 capsules (15 mg) by mouth At Bedtime    Nightmares  associated with chronic post-traumatic stress disorder       REFRESH OP      Apply to eye as needed Both eyes        replens Gel     35 g    Use vaginally as needed. Can use up to 3 times per week.    Vaginal dryness       simvastatin 20 MG tablet    ZOCOR    90 tablet    Take 1 tablet (20 mg) by mouth At Bedtime    Chronic kidney disease, stage V (H)       sulfamethoxazole-trimethoprim 400-80 MG per tablet    BACTRIM/SEPTRA    90 tablet    Take 1 tablet by mouth daily    Immunosuppression (H)       topiramate 200 MG tablet    TOPAMAX    60 tablet    Take 1 tablet (200 mg) by mouth 2 times daily    Morbid obesity due to excess calories (H)       TYLENOL 325 MG tablet   Generic drug:  acetaminophen      Take 325-650 mg by mouth as needed        vilazodone 40 MG Tabs tablet    VIIBRYD    30 tablet    Take 1 tablet (40 mg) by mouth daily    Major depressive disorder, recurrent episode, moderate (H)       vitamin D 1000 units capsule     30 capsule    2,000 Units        * Notice:  This list has 4 medication(s) that are the same as other medications prescribed for you. Read the directions carefully, and ask your doctor or other care provider to review them with you.

## 2018-11-05 NOTE — LETTER
11/5/2018       RE: Elizabeth Palma  [address]  [facility] [address]     Dear Colleague,    Thank you for referring your patient, Elizabeth Palma, to the [facility] ENDOCRINOLOGY at [facility]. Please see a copy of my visit note below.    Diabetes, Endocrinology and Metabolism Clinic -return visit      Reason for visit:  63 year old female referred to endocrine clinic for follow up of osteoporosis with compression fracture treated with reclast since 2015. Wrist fracture in summer 2017 but limited treatment options due to renal transplant and PTH elevation.     Interval history:   No falls or fracture recently. She had a fall in July 2017 playing mini golf.  Surgery December 2017. She has been taking calcium and vitamin D.  Last Reclast infusion was done on November 28, 2017. No kidney stones.  Steadily losing weight.  Total 70 pounds.  On topiramate followed by Dr. Irene.  No back pain, height loss.,   Due to reclast infusion 4 of 5 planned treatment.   No new concerns.   No back pain or height loss.   Taking Ca at Vit D.     Assessment and Plan:  Incidental adrenal nodule that is detected on the CT scan abdomen.   Osteoporosis with 2.5 inch height loss. T9 compression fracture.   Type 2 DM and neuropathy well controlled.   History of gastric bypass  hyperparathyroidism--history of renal txp with nl kidney function and continues with mild PTH elevation, nl renal function. Due to high normal Ca, this was thought to be tertiary but with transplant and improvement in renal function, PTH level has been steadily improving.  Mild creatinine elevation followed by PCP, planned for repeat labs this week.     Plans--  Pending DXA, calcium labs were normal.   24 hour urine Ca > 200, plan to reduce calcium supplement to daily and continue ca from diet.   If there is bone decline, plan to switch to Prolia   Continue Vit D3 daily  Ca 500 mg daily.   Zoledronic acid  "infusion annually cycle 4 of 5 planned treatment in 2018.   Continue Metformin 1500 mg daily. Patient following up with Dr Sheridan.      Dietary Ca source discussed.   Return in 6 months     Lizbet Byers MD  [id number]  Endocrinology Service    ==============    Add:   Normal 24 hour urine.   Further bone decline noted. Prelim report as below, agree with final report.   Plan to switch to Prolia. Called patient. Side effects discussed. RN msg sent.       =======================    History of presenting illness: Ms. Palma has significant past medical history of DM type 2, dyslipidemia, ESRD on HD, PCKD, HTN, s/p gastric bypass, pre mature ovarian failure, lactose intolerance. She has long standing history of osteoporosis with last DXA scan in 2009 showed t score of - 3.8 at spine and z- score of - 4.0. She was started on HD in 3/2012. Prior to that she had received treatment with alendronate and \" 3 monthly IV\" likely ibandronate but was discontinued because of progressively decline in her kidney functions. She sustained multiple fracture including bilateral wrists and ankles.    She has multiple risk factors for osteoporosis including pre mature menopause, lactose intolerance and not being able to take Ca, gastric surgery and ESRD. She was explained that CKD related bone disease is low turn over condition and usually is not treated with bisphosphonate. Had DEXA 10/14 with significant interim bone loss so was re-referred to Endocrine.    Transplant done 3 years ago with improvement in kidney function and PTH level.     Reclast infusion #1 done June 2015, #2 done in September 2016, 3rd infusion November 2017.  Repeat DXA was done in 10/13/16 that showed improvement in lumbar verterbrae and hip compared to the last bone density.     Medications: This is according to her records.  She is not sure about the dose.  Vitamin D3 dose is 3000 IU daily  Also getting calcium + D at 500 mg calcium twice daily and an additional " dairy serving daily    Type 2 Diabetes Mellitus with neuroapathy  Followed by PCP Dr Sheridan and doing well with control  Recently taken of Januvia due to ? Low BG  For DM2 remains on metformin XR 1500 mg daily with excellent at target HbA1c.    No polyuria or polydipsia.     Morbid Obesity  Followed at wt loss clinic here by Dr. Irene--her wt is down about 29#   On Topiramate 200 mg daily   - states that she feels tired and somnolent but is happy with weight loss.    ROS is otherwise unremarkable.       PM/SH:  Past Medical History:   Diagnosis Date     Abnormal MRI, cervical spine 10/15/2011    2011; mild changes noted. Study done for left arm symptoms Impression:  1. Mild multilevel degenerative disc disease with no significant canal or neural stenosis seen. motion artifact on the STIR images in these are not interpretable. The remaining images were interpreted      Autosomal dominant polycystic kidney disease 2011     (Problem list name updated by automated process. Provider to review and confirm.)     [facility] DJD(carpometacarpal degenerative joint disease), localized primary 3/5/2013     -donor kidney transplant 3/20/2014     Depressive disorder 11/15/2012     DM type 2 (diabetes mellitus, type 2) (H) 2013     Encounter for long-term (current) use of other medications 2015     Family history of tremor 10/17/2011     Generalized anxiety disorder 11/15/2012     Glaucoma      Hyperlipidemia 10/15/2011     Hyperparathyroidism, secondary (H) 2015     Hypertension     resolved     Immunosuppressed status (H) 3/20/2014     Major depressive disorder, recurrent episode, moderate (H) 11/15/2012     Obesity (BMI 30-39.9)      OP (osteoporosis) T score -3.8 2009 T-score -3.7      LION (obstructive sleep apnoea) 10/15/2012    intol to cpa     Pain in joint, forearm -- L unhealed Fx 2013     Premature menopause age 37 7/10/2012    OCP (vaginal bldg)-->HT which she stopped 2 mo  later documented at Jan 12, 2007 visit (age 51).      Restless leg syndrome      Rib fractures 4/13/2013     Sensory loss 10/17/2011    Bottom of feet; uncertain if there is a neuropathy per notes.       Stiffness of joint, not elsewhere classified, hand 3/5/2013     Tremor 10/15/2011    head     Uncomplicated asthma      Past Surgical History:   Procedure Laterality Date     ABDOMEN SURGERY       ANKLE SURGERY       C TRANSPLANTATION OF KIDNEY  3/2014     C/SECTION, LOW TRANSVERSE      x 2     CHOLECYSTECTOMY  1990     COLONOSCOPY       ESOPHAGOSCOPY, GASTROSCOPY, DUODENOSCOPY (EGD), COMBINED N/A 5/19/2015    Procedure: COMBINED ESOPHAGOSCOPY, GASTROSCOPY, DUODENOSCOPY (EGD);  Surgeon: Sky Davey MD;  Location:  GI     ESOPHAGOSCOPY, GASTROSCOPY, DUODENOSCOPY (EGD), COMBINED N/A 5/19/2015    Procedure: COMBINED ESOPHAGOSCOPY, GASTROSCOPY, DUODENOSCOPY (EGD), BIOPSY SINGLE OR MULTIPLE;  Surgeon: Sky Davey MD;  Location:  GI     EYE SURGERY       LAPAROSCOPY, SURGICAL; REPAIR INCISIONAL OR VENTRAL HERNIA       ORTHOPEDIC SURGERY       HERMINIA EN Y BOWEL  1990     WRIST SURGERY       Medications:  Current Outpatient Prescriptions   Medication Sig Dispense Refill     acetaminophen (TYLENOL) 325 MG tablet Take 325-650 mg by mouth as needed       acetylcysteine (N-ACETYL-L-CYSTEINE) 600 MG CAPS capsule Take 2 400 mg caps two times daily for total daily dose of 800 mg 30 capsule      albuterol (PROAIR HFA/PROVENTIL HFA/VENTOLIN HFA) 108 (90 Base) MCG/ACT inhaler Inhale 2 puffs into the lungs every 6 hours as needed for shortness of breath / dyspnea or wheezing 1 Inhaler 5     ARIPiprazole (ABILIFY) 2 MG tablet Take 1 tablet (2 mg) by mouth daily 30 tablet 2     aspirin EC 81 MG EC tablet Take 1 tablet (81 mg) by mouth daily 30 tablet 5     blood glucose monitoring (ACCU-CHEK RALPH PLUS) meter device kit   0     blood glucose monitoring (NO BRAND SPECIFIED) meter device kit Use to test blood sugar 2  times daily or as directed. 1 kit 0     blood glucose monitoring (NO BRAND SPECIFIED) test strip Use to test blood sugars 2 times daily or as directed 100 strip 11     blood glucose monitoring (SOFTCLIX) lancets Use to test blood sugar 2 times daily or as directed. 1 Box 11     Cholecalciferol (VITAMIN D) 1000 UNITS capsule 2,000 Units  30 capsule      cyanocolbalamin (VITAMIN  B-12) 1000 MCG tablet Take 1 tablet by mouth daily. (Patient taking differently: Take 1,000 mcg by mouth every other day ) 30 tablet 0     cycloSPORINE modified (GENERIC EQUIVALENT) 25 MG capsule TAKE 5 CAPSULES (125MG) BY MOUTH TWO TIMES A  capsule 6     cycloSPORINE modified (GENERIC EQUIVALENT) 25 MG capsule Take 5 capsules (125 mg) by mouth 2 times daily 300 capsule 6     econazole nitrate 1 % cream Apply topically daily 85 g 3     fluticasone (FLONASE) 50 MCG/ACT spray Spray 1 spray into both nostrils daily 1 Bottle 0     furosemide (LASIX) 20 MG tablet Take 1 tablet (20 mg) by mouth daily 90 tablet 3     latanoprost (XALATAN) 0.005 % ophthalmic solution Place 1 drop into both eyes At Bedtime 7.5 mL 2     metFORMIN (GLUCOPHAGE-XR) 500 MG 24 hr tablet Take 3 tablets (1,500 mg) by mouth daily (with dinner) 90 tablet 11     mycophenolate (GENERIC EQUIVALENT) 250 MG capsule Take 4 capsules (1,000 mg) by mouth 2 times daily 240 capsule 11     omeprazole (PRILOSEC) 40 MG capsule Take 1 capsule (40 mg) by mouth daily 90 capsule 3     ondansetron (ZOFRAN-ODT) 4 MG ODT tab Take 1 tablet (4 mg) by mouth every 6 hours as needed 20 tablet 3     order for DME Walker with front wheels and a seat. 1 Units 0     Polyvinyl Alcohol-Povidone (REFRESH OP) Apply to eye as needed Both eyes       prazosin (MINIPRESS) 5 MG capsule Take 3 capsules (15 mg) by mouth At Bedtime 90 capsule 3     simvastatin (ZOCOR) 20 MG tablet Take 1 tablet (20 mg) by mouth At Bedtime 90 tablet 3     sulfamethoxazole-trimethoprim (BACTRIM/SEPTRA) 400-80 MG per tablet Take 1  "tablet by mouth daily 90 tablet 3     topiramate (TOPAMAX) 200 MG tablet Take 1 tablet (200 mg) by mouth 2 times daily 60 tablet 5     Vaginal Lubricant (REPLENS) GEL Use vaginally as needed. Can use up to 3 times per week. 35 g 11     vilazodone (VIIBRYD) 40 MG TABS tablet Take 1 tablet (40 mg) by mouth daily 30 tablet 3      Allergies:  Allergies   Allergen Reactions     Percocet [Oxycodone-Acetaminophen] Nausea and Vomiting     Novocain [Procaine Hcl] Hives     Had reaction 25 years ago to old renetta. Pt reports multiple injections of lidocaine since then without reaction.  Tolerated lidocaine injection today without difficulty.  Osmar Mark MD IR Service.     Social History:  She lives with her  in [facility], no smoking or alcohol use. Worked as teacher.     Family history  No family history of hip fracture.   No bone issues, ca issues in family.     Review of System:  ROS was done and was negative except mention above (gen/endo/MSK reviewed)    Physical Examination:  /75  Pulse 69  Ht 1.55 m (5' 1.02\")  Wt 66.9 kg (147 lb 8 oz)  BMI 27.85 kg/m2   SHAUNNA: Middle aged lady in no acute distress  HEENT: Head normal, eomi, nl scleral icteris or proptosis  GI: nondistended  Chest clear to auscultation.  CVS S1 S2 no tachycardia.   NEURO: fully A and O, no resting tremor, nl gait  Extremities: hair distribution, no edema/c/c  SKIN: No skin changes.  PSYCH: Normal mood, pleasant affect  Spine non tender.     Laboratory Work up:   Last , K 4.2, Cr 1.06 in 11/7/16    ATTENTION:  Easy to read DXA/VFA reports (formatted and presented as tables)   can be found in EPIC under Chart review >  Imaging tab >  DXA    [facility]    [address] [place] [id number]  Phone: [phone]   Fax: [phone]     final    Patient name:                            ANITA ULRICH ([id number] )  Patient demographics:               61.1 year [age] White " Female of 61.0 in. height and 190.0 lbs. weight  Ordering provider:                     MAGI ENG  History:                                     BARIATRIC SURGERY, family hx of hip fractures, family hx of osteoporosis, Hyperparathyroid, kidney transplant recipient, LOW BONE DENSITY, POSTMENOPAUSAL status.  There is a history of fracture.  Current treatments:                   calcium, vitamin D, transplant medications  Previous treatments:                 ibandronate  Scan:                                        DXA exam ([id number] ): Clone    Exam date:                               10/13/2016    Comparison:                             10/13/2016  10/13/2014  09/21/2009  11/17/2008  01/19/2007    Dual energy x-ray absorptiometry (DXA) results:      Region  Date  BMD  T - score  Z - score  BMD change from baseline  BMD % change from baseline  BMD change from previous  BMD % change from previous    L1-L4  10/13/2016  0.706 g/cm   -4.0  -3.5  -0.035 g/cm   -4.7%  0.069 g/cm   10.8%    L1-L4  10/13/2014  0.637 g/cm                 L1-L4  09/21/2009  0.718 g/cm                 L1-L4  11/17/2008  0.759 g/cm                 L1-L4  01/19/2007  0.741 g/cm       baseline  baseline  -  -        Neck Left  10/13/2016  0.806 g/cm   -1.7  -0.9            Neck Left  10/13/2014  0.765 g/cm                 Neck Left  09/21/2009  0.907 g/cm                 Neck Left  11/17/2008  0.895 g/cm                 Neck Left  01/19/2007  0.941 g/cm                     Total Left  10/13/2016  0.818 g/cm   -1.5  -1.1  -0.139 g/cm   -14.5%  0.013 g/cm   1.6%    Total Left  10/13/2014  0.805 g/cm                 Total Left  09/21/2009  0.884 g/cm                 Total Left  11/17/2008  0.924 g/cm                 Total Left  01/19/2007  0.957 g/cm       baseline  baseline  -  -                                                Neck Right  10/13/2016  0.788 g/cm   -1.8  -1.1            Neck Right  10/13/2014  0.790 g/cm                  Neck Right  09/21/2009  0.858 g/cm                 Neck Right  11/17/2008  0.825 g/cm                 Neck Right  01/19/2007  0.952 g/cm                     Total Right  10/13/2016  0.841 g/cm   -1.3  -1.0  -0.124 g/cm   -12.8%  0.014 g/cm   1.7%    Total Right  10/13/2014  0.827 g/cm                 Total Right  09/21/2009  0.892 g/cm                 Total Right  11/17/2008  0.903 g/cm                 Total Right  01/19/2007  0.965 g/cm       baseline  baseline  -  -        B2-B3  10/13/2016  0.222 g/cm   -4.5  -3.2  baseline  baseline  N/A  N/A       ?????????????????  Conclusions:    Based on the lowest and valid T-score of -4.0  at the level of the lumbar spine  according to WHO criteria for postmenopausal females and men age 50 and over (see Ref. #1), this individual has OSTEOPOROSIS . (see Ref. #4) The risk of osteoporotic fracture increases approximately 2-fold for each 1.0 SD decrease in T-score.  Low bone density is not the only risk factor for fracture; other factors to consider would include patient's age, risk of falling, risk of injury, previous osteoporotic fracture, family history of osteoporosis, etc.    Of note, the lateral lumbar spine is not used in diagnosis of osteoporosis/low bone density but may be useful in monitoring.    COMPARISONS WERE MADE ONLY TO THE PREVOIUS AND BASELINE STUDIES:  Taking into account the precision errors for this center, the calculated change in BMD at the level of the lumbar spine and bilateral total hips (bone loss) as shown is significant (see Ref.#7) compared with 2007.      Taking into account the precision errors for this center, the calculated change in BMD at the level of the lumbar spine (bone gain)  as shown is significant (see Ref.#7)  compared with 2014.  Please note that the differential diagnosis of BMD increase in the spine includes improvement due to pharmacotherapy vs inter-current progression of spine degeneration or fracture.      Clinical  correlation is recommended based on the history of past fracture (see Ref #8)    Bone densitometry cannot rule out secondary causes of bone loss.  Therefore, further metabolic testing to look for secondary causes of low BMD should be performed if indicated.    Repeat DXA testing in 2 years could be considered.        Clinical correlation recommended.      Principal result :  Alanis Lucero MD, [facility]  Division of Diabetes, Endocrinology and Metabolism  [facility]  [phone]         I reviewed the imaging study of DXA.  Further bone decline at hip was noted. Final results are pending.     Plan to change to Prolia.   Patient was called , final results discussed, therapy change, informed RN care coordinator for drug therapy change.       Again, thank you for allowing me to participate in the care of your patient.      Sincerely,    Lizbet Byers MD

## 2018-11-05 NOTE — Clinical Note
Prolia infusion started.  Discontinued Reclast.  Called patient and discussed plan already.  Please check to see if the insurance approves this and plan with patient to get her scheduled for infusion.  ThanksAldo Jones

## 2018-11-05 NOTE — PROGRESS NOTES
Diabetes, Endocrinology and Metabolism Clinic -return visit      Reason for visit:  63 year old female referred to endocrine clinic for follow up of osteoporosis with compression fracture treated with reclast since 2015. Wrist fracture in summer 2017 but limited treatment options due to renal transplant and PTH elevation.     Interval history:   No falls or fracture recently. She had a fall in July 2017 playing mini golf.  Surgery December 2017. She has been taking calcium and vitamin D.  Last Reclast infusion was done on November 28, 2017. No kidney stones.  Steadily losing weight.  Total 70 pounds.  On topiramate followed by Dr. Irene.  No back pain, height loss.,   Due to reclast infusion 4 of 5 planned treatment.   No new concerns.   No back pain or height loss.   Taking Ca at Vit D.     Assessment and Plan:  Incidental adrenal nodule that is detected on the CT scan abdomen.   Osteoporosis with 2.5 inch height loss. T9 compression fracture.   Type 2 DM and neuropathy well controlled.   History of gastric bypass  hyperparathyroidism--history of renal txp with nl kidney function and continues with mild PTH elevation, nl renal function. Due to high normal Ca, this was thought to be tertiary but with transplant and improvement in renal function, PTH level has been steadily improving.  Mild creatinine elevation followed by PCP, planned for repeat labs this week.     Plans--  Pending DXA, calcium labs were normal.   24 hour urine Ca > 200, plan to reduce calcium supplement to daily and continue ca from diet.   If there is bone decline, plan to switch to Prolia   Continue Vit D3 daily  Ca 500 mg daily.   Zoledronic acid infusion annually cycle 4 of 5 planned treatment in 2018.   Continue Metformin 1500 mg daily. Patient following up with Dr Sheridan.      Dietary Ca source discussed.   Return in 6 months     Lizbet Byers MD  [id number]  Endocrinology Service    ==============    Add:   Normal 24 hour urine.   Further  "bone decline noted. Prelim report as below, agree with final report.   Plan to switch to Prolia. Called patient. Side effects discussed. RN msg sent.       =======================    History of presenting illness: Ms. Palma has significant past medical history of DM type 2, dyslipidemia, ESRD on HD, PCKD, HTN, s/p gastric bypass, pre mature ovarian failure, lactose intolerance. She has long standing history of osteoporosis with last DXA scan in 2009 showed t score of - 3.8 at spine and z- score of - 4.0. She was started on HD in 3/2012. Prior to that she had received treatment with alendronate and \" 3 monthly IV\" likely ibandronate but was discontinued because of progressively decline in her kidney functions. She sustained multiple fracture including bilateral wrists and ankles.    She has multiple risk factors for osteoporosis including pre mature menopause, lactose intolerance and not being able to take Ca, gastric surgery and ESRD. She was explained that CKD related bone disease is low turn over condition and usually is not treated with bisphosphonate. Had DEXA 10/14 with significant interim bone loss so was re-referred to Endocrine.    Transplant done 3 years ago with improvement in kidney function and PTH level.     Reclast infusion #1 done June 2015, #2 done in September 2016, 3rd infusion November 2017.  Repeat DXA was done in 10/13/16 that showed improvement in lumbar verterbrae and hip compared to the last bone density.     Medications: This is according to her records.  She is not sure about the dose.  Vitamin D3 dose is 3000 IU daily  Also getting calcium + D at 500 mg calcium twice daily and an additional dairy serving daily    Type 2 Diabetes Mellitus with neuroapathy  Followed by PCP Dr Sheridan and doing well with control  Recently taken of Januvia due to ? Low BG  For DM2 remains on metformin XR 1500 mg daily with excellent at target HbA1c.    No polyuria or polydipsia.     Morbid Obesity  Followed at " wt loss clinic here by Dr. Irene--her wt is down about 29#   On Topiramate 200 mg daily   - states that she feels tired and somnolent but is happy with weight loss.    ROS is otherwise unremarkable.       PM/SH:  Past Medical History:   Diagnosis Date     Abnormal MRI, cervical spine 10/15/2011    2011; mild changes noted. Study done for left arm symptoms Impression:  1. Mild multilevel degenerative disc disease with no significant canal or neural stenosis seen. motion artifact on the STIR images in these are not interpretable. The remaining images were interpreted      Autosomal dominant polycystic kidney disease 2011     (Problem list name updated by automated process. Provider to review and confirm.)     CMC DJD(carpometacarpal degenerative joint disease), localized primary 3/5/2013     -donor kidney transplant 3/20/2014     Depressive disorder 11/15/2012     DM type 2 (diabetes mellitus, type 2) (H) 2013     Encounter for long-term (current) use of other medications 2015     Family history of tremor 10/17/2011     Generalized anxiety disorder 11/15/2012     Glaucoma      Hyperlipidemia 10/15/2011     Hyperparathyroidism, secondary (H) 2015     Hypertension     resolved     Immunosuppressed status (H) 3/20/2014     Major depressive disorder, recurrent episode, moderate (H) 11/15/2012     Obesity (BMI 30-39.9)      OP (osteoporosis) T score -3.8 2009 T-score -3.7      LION (obstructive sleep apnoea) 10/15/2012    intol to cpa     Pain in joint, forearm -- L unhealed Fx 2013     Premature menopause age 37 7/10/2012    OCP (vaginal bldg)-->HT which she stopped 2 mo later documented at 2007 visit (age 51).      Restless leg syndrome      Rib fractures 2013     Sensory loss 10/17/2011    Bottom of feet; uncertain if there is a neuropathy per notes.       Stiffness of joint, not elsewhere classified, hand 3/5/2013     Tremor 10/15/2011    head      Uncomplicated asthma      Past Surgical History:   Procedure Laterality Date     ABDOMEN SURGERY       ANKLE SURGERY       C TRANSPLANTATION OF KIDNEY  3/2014     C/SECTION, LOW TRANSVERSE      x 2     CHOLECYSTECTOMY  1990     COLONOSCOPY       ESOPHAGOSCOPY, GASTROSCOPY, DUODENOSCOPY (EGD), COMBINED N/A 5/19/2015    Procedure: COMBINED ESOPHAGOSCOPY, GASTROSCOPY, DUODENOSCOPY (EGD);  Surgeon: Sky Davey MD;  Location:  GI     ESOPHAGOSCOPY, GASTROSCOPY, DUODENOSCOPY (EGD), COMBINED N/A 5/19/2015    Procedure: COMBINED ESOPHAGOSCOPY, GASTROSCOPY, DUODENOSCOPY (EGD), BIOPSY SINGLE OR MULTIPLE;  Surgeon: Sky Davey MD;  Location:  GI     EYE SURGERY       LAPAROSCOPY, SURGICAL; REPAIR INCISIONAL OR VENTRAL HERNIA       ORTHOPEDIC SURGERY       HERMINIA EN Y BOWEL  1990     WRIST SURGERY       Medications:  Current Outpatient Prescriptions   Medication Sig Dispense Refill     acetaminophen (TYLENOL) 325 MG tablet Take 325-650 mg by mouth as needed       acetylcysteine (N-ACETYL-L-CYSTEINE) 600 MG CAPS capsule Take 2 400 mg caps two times daily for total daily dose of 800 mg 30 capsule      albuterol (PROAIR HFA/PROVENTIL HFA/VENTOLIN HFA) 108 (90 Base) MCG/ACT inhaler Inhale 2 puffs into the lungs every 6 hours as needed for shortness of breath / dyspnea or wheezing 1 Inhaler 5     ARIPiprazole (ABILIFY) 2 MG tablet Take 1 tablet (2 mg) by mouth daily 30 tablet 2     aspirin EC 81 MG EC tablet Take 1 tablet (81 mg) by mouth daily 30 tablet 5     blood glucose monitoring (ACCU-CHEK RALPH PLUS) meter device kit   0     blood glucose monitoring (NO BRAND SPECIFIED) meter device kit Use to test blood sugar 2 times daily or as directed. 1 kit 0     blood glucose monitoring (NO BRAND SPECIFIED) test strip Use to test blood sugars 2 times daily or as directed 100 strip 11     blood glucose monitoring (SOFTCLIX) lancets Use to test blood sugar 2 times daily or as directed. 1 Box 11     Cholecalciferol  (VITAMIN D) 1000 UNITS capsule 2,000 Units  30 capsule      cyanocolbalamin (VITAMIN  B-12) 1000 MCG tablet Take 1 tablet by mouth daily. (Patient taking differently: Take 1,000 mcg by mouth every other day ) 30 tablet 0     cycloSPORINE modified (GENERIC EQUIVALENT) 25 MG capsule TAKE 5 CAPSULES (125MG) BY MOUTH TWO TIMES A  capsule 6     cycloSPORINE modified (GENERIC EQUIVALENT) 25 MG capsule Take 5 capsules (125 mg) by mouth 2 times daily 300 capsule 6     econazole nitrate 1 % cream Apply topically daily 85 g 3     fluticasone (FLONASE) 50 MCG/ACT spray Spray 1 spray into both nostrils daily 1 Bottle 0     furosemide (LASIX) 20 MG tablet Take 1 tablet (20 mg) by mouth daily 90 tablet 3     latanoprost (XALATAN) 0.005 % ophthalmic solution Place 1 drop into both eyes At Bedtime 7.5 mL 2     metFORMIN (GLUCOPHAGE-XR) 500 MG 24 hr tablet Take 3 tablets (1,500 mg) by mouth daily (with dinner) 90 tablet 11     mycophenolate (GENERIC EQUIVALENT) 250 MG capsule Take 4 capsules (1,000 mg) by mouth 2 times daily 240 capsule 11     omeprazole (PRILOSEC) 40 MG capsule Take 1 capsule (40 mg) by mouth daily 90 capsule 3     ondansetron (ZOFRAN-ODT) 4 MG ODT tab Take 1 tablet (4 mg) by mouth every 6 hours as needed 20 tablet 3     order for DME Walker with front wheels and a seat. 1 Units 0     Polyvinyl Alcohol-Povidone (REFRESH OP) Apply to eye as needed Both eyes       prazosin (MINIPRESS) 5 MG capsule Take 3 capsules (15 mg) by mouth At Bedtime 90 capsule 3     simvastatin (ZOCOR) 20 MG tablet Take 1 tablet (20 mg) by mouth At Bedtime 90 tablet 3     sulfamethoxazole-trimethoprim (BACTRIM/SEPTRA) 400-80 MG per tablet Take 1 tablet by mouth daily 90 tablet 3     topiramate (TOPAMAX) 200 MG tablet Take 1 tablet (200 mg) by mouth 2 times daily 60 tablet 5     Vaginal Lubricant (REPLENS) GEL Use vaginally as needed. Can use up to 3 times per week. 35 g 11     vilazodone (VIIBRYD) 40 MG TABS tablet Take 1 tablet (40  "mg) by mouth daily 30 tablet 3      Allergies:  Allergies   Allergen Reactions     Percocet [Oxycodone-Acetaminophen] Nausea and Vomiting     Novocain [Procaine Hcl] Hives     Had reaction 25 years ago to old renetta. Pt reports multiple injections of lidocaine since then without reaction.  Tolerated lidocaine injection today without difficulty.  Osmar Mark MD IR Service.     Social History:  She lives with her  in [facility], no smoking or alcohol use. Worked as teacher.     Family history  No family history of hip fracture.   No bone issues, ca issues in family.     Review of System:  ROS was done and was negative except mention above (gen/endo/MSK reviewed)    Physical Examination:  /75  Pulse 69  Ht 1.55 m (5' 1.02\")  Wt 66.9 kg (147 lb 8 oz)  BMI 27.85 kg/m2   SHAUNNA: Middle aged lady in no acute distress  HEENT: Head normal, eomi, nl scleral icteris or proptosis  GI: nondistended  Chest clear to auscultation.  CVS S1 S2 no tachycardia.   NEURO: fully A and O, no resting tremor, nl gait  Extremities: hair distribution, no edema/c/c  SKIN: No skin changes.  PSYCH: Normal mood, pleasant affect  Spine non tender.     Laboratory Work up:   Last , K 4.2, Cr 1.06 in 11/7/16    ATTENTION:  Easy to read DXA/VFA reports (formatted and presented as tables)   can be found in EPIC under Chart review >  Imaging tab >  DXA    [facility]    [address] [place] [id number]  Phone: [phone]   Fax: [phone]     final    Patient name:                            ANITA ULRICH ([id number] )  Patient demographics:               [age] year [age] White Female of 61.0 in. height and 190.0 lbs. weight  Ordering provider:                     MAGI ENG  History:                                     BARIATRIC SURGERY, family hx of hip fractures, family hx of osteoporosis, Hyperparathyroid, kidney transplant recipient, LOW BONE DENSITY, " POSTMENOPAUSAL status.  There is a history of fracture.  Current treatments:                   calcium, vitamin D, transplant medications  Previous treatments:                 ibandronate  Scan:                                        DXA exam ([id number] ): [employer]    Exam date:                               10/13/2016    Comparison:                             10/13/2016  10/13/2014  09/21/2009  11/17/2008  01/19/2007    Dual energy x-ray absorptiometry (DXA) results:      Region  Date  BMD  T - score  Z - score  BMD change from baseline  BMD % change from baseline  BMD change from previous  BMD % change from previous    L1-L4  10/13/2016  0.706 g/cm   -4.0  -3.5  -0.035 g/cm   -4.7%  0.069 g/cm   10.8%    L1-L4  10/13/2014  0.637 g/cm                 L1-L4  09/21/2009  0.718 g/cm                 L1-L4  11/17/2008  0.759 g/cm                 L1-L4  01/19/2007  0.741 g/cm       baseline  baseline  -  -        Neck Left  10/13/2016  0.806 g/cm   -1.7  -0.9            Neck Left  10/13/2014  0.765 g/cm                 Neck Left  09/21/2009  0.907 g/cm                 Neck Left  11/17/2008  0.895 g/cm                 Neck Left  01/19/2007  0.941 g/cm                     Total Left  10/13/2016  0.818 g/cm   -1.5  -1.1  -0.139 g/cm   -14.5%  0.013 g/cm   1.6%    Total Left  10/13/2014  0.805 g/cm                 Total Left  09/21/2009  0.884 g/cm                 Total Left  11/17/2008  0.924 g/cm                 Total Left  01/19/2007  0.957 g/cm       baseline  baseline  -  -                                                Neck Right  10/13/2016  0.788 g/cm   -1.8  -1.1            Neck Right  10/13/2014  0.790 g/cm                 Neck Right  09/21/2009  0.858 g/cm                 Neck Right  11/17/2008  0.825 g/cm                 Neck Right  01/19/2007  0.952 g/cm                     Total Right  10/13/2016  0.841 g/cm   -1.3  -1.0  -0.124 g/cm   -12.8%  0.014 g/cm   1.7%    Total Right  10/13/2014   0.827 g/cm                 Total Right  09/21/2009  0.892 g/cm                 Total Right  11/17/2008  0.903 g/cm                 Total Right  01/19/2007  0.965 g/cm       baseline  baseline  -  -        B2-B3  10/13/2016  0.222 g/cm   -4.5  -3.2  baseline  baseline  N/A  N/A       ?????????????????  Conclusions:    Based on the lowest and valid T-score of -4.0  at the level of the lumbar spine  according to WHO criteria for postmenopausal females and men age 50 and over (see Ref. #1), this individual has OSTEOPOROSIS . (see Ref. #4) The risk of osteoporotic fracture increases approximately 2-fold for each 1.0 SD decrease in T-score.  Low bone density is not the only risk factor for fracture; other factors to consider would include patient's age, risk of falling, risk of injury, previous osteoporotic fracture, family history of osteoporosis, etc.    Of note, the lateral lumbar spine is not used in diagnosis of osteoporosis/low bone density but may be useful in monitoring.    COMPARISONS WERE MADE ONLY TO THE PREVOIUS AND BASELINE STUDIES:  Taking into account the precision errors for this center, the calculated change in BMD at the level of the lumbar spine and bilateral total hips (bone loss) as shown is significant (see Ref.#7) compared with 2007.      Taking into account the precision errors for this center, the calculated change in BMD at the level of the lumbar spine (bone gain)  as shown is significant (see Ref.#7)  compared with 2014.  Please note that the differential diagnosis of BMD increase in the spine includes improvement due to pharmacotherapy vs inter-current progression of spine degeneration or fracture.      Clinical correlation is recommended based on the history of past fracture (see Ref #8)    Bone densitometry cannot rule out secondary causes of bone loss.  Therefore, further metabolic testing to look for secondary causes of low BMD should be performed if indicated.    Repeat DXA testing in 2  years could be considered.        Clinical correlation recommended.      Principal result :  Alanis Lucero MD, CCD  Division of Diabetes, Endocrinology and Metabolism  [facility]  [phone]         I reviewed the imaging study of DXA.  Further bone decline at hip was noted. Final results are pending.     Plan to change to Prolia.   Patient was called , final results discussed, therapy change, informed RN care coordinator for drug therapy change.       Answers for HPI/ROS submitted by the patient on 10/29/2018   Weight loss: Yes  Fatigue: Yes  Increased stress: Yes  Loss of height: Yes  Change in or Loss of Energy: Yes  Trouble holding urine or incontinence: Yes  Pain or burning: Yes  Increased frequency of urination: Yes  Blood in urine: Yes  Frequent nighttime urination: Yes  Vaginal dryness: Yes  Reduced libido: Yes  Painful intercourse: Yes  Difficulty with sexual arousal: Yes  Nervous or Anxious: Yes  Depression: Yes  Trouble sleeping: Yes  Trouble thinking or concentrating: Yes  Mood changes: Yes  Panic attacks: Yes

## 2018-11-06 ENCOUNTER — OFFICE VISIT (OUTPATIENT)
Dept: PSYCHIATRY | Facility: CLINIC | Age: 63
End: 2018-11-06
Payer: MEDICARE

## 2018-11-06 VITALS
SYSTOLIC BLOOD PRESSURE: 129 MMHG | HEART RATE: 92 BPM | WEIGHT: 149.2 LBS | DIASTOLIC BLOOD PRESSURE: 81 MMHG | BODY MASS INDEX: 28.17 KG/M2 | RESPIRATION RATE: 16 BRPM

## 2018-11-06 DIAGNOSIS — F43.12 NIGHTMARES ASSOCIATED WITH CHRONIC POST-TRAUMATIC STRESS DISORDER: ICD-10-CM

## 2018-11-06 DIAGNOSIS — F33.1 MAJOR DEPRESSIVE DISORDER, RECURRENT EPISODE, MODERATE (H): ICD-10-CM

## 2018-11-06 DIAGNOSIS — Z94.0 KIDNEY TRANSPLANTED: ICD-10-CM

## 2018-11-06 DIAGNOSIS — R33.9 INABILITY TO URINATE: ICD-10-CM

## 2018-11-06 DIAGNOSIS — F51.5 NIGHTMARES ASSOCIATED WITH CHRONIC POST-TRAUMATIC STRESS DISORDER: ICD-10-CM

## 2018-11-06 DIAGNOSIS — Z48.298 AFTERCARE FOLLOWING ORGAN TRANSPLANT: ICD-10-CM

## 2018-11-06 DIAGNOSIS — N39.3 FEMALE STRESS INCONTINENCE: ICD-10-CM

## 2018-11-06 LAB
ALBUMIN UR-MCNC: NEGATIVE MG/DL
ANION GAP SERPL CALCULATED.3IONS-SCNC: 11 MMOL/L (ref 3–14)
APPEARANCE UR: CLEAR
BILIRUB UR QL STRIP: NEGATIVE
BUN SERPL-MCNC: 17 MG/DL (ref 7–30)
CALCIUM SERPL-MCNC: 8.5 MG/DL (ref 8.5–10.1)
CHLORIDE SERPL-SCNC: 106 MMOL/L (ref 94–109)
CO2 SERPL-SCNC: 22 MMOL/L (ref 20–32)
COLOR UR AUTO: YELLOW
CREAT SERPL-MCNC: 1.04 MG/DL (ref 0.52–1.04)
ERYTHROCYTE [DISTWIDTH] IN BLOOD BY AUTOMATED COUNT: 15.7 % (ref 10–15)
GFR SERPL CREATININE-BSD FRML MDRD: 54 ML/MIN/1.7M2
GLUCOSE SERPL-MCNC: 169 MG/DL (ref 70–99)
GLUCOSE UR STRIP-MCNC: NEGATIVE MG/DL
HCT VFR BLD AUTO: 39 % (ref 35–47)
HGB BLD-MCNC: 11.7 G/DL (ref 11.7–15.7)
HGB UR QL STRIP: NEGATIVE
KETONES UR STRIP-MCNC: NEGATIVE MG/DL
LEUKOCYTE ESTERASE UR QL STRIP: ABNORMAL
MCH RBC QN AUTO: 24.4 PG (ref 26.5–33)
MCHC RBC AUTO-ENTMCNC: 30 G/DL (ref 31.5–36.5)
MCV RBC AUTO: 81 FL (ref 78–100)
NITRATE UR QL: NEGATIVE
PH UR STRIP: 6 PH (ref 5–7)
PLATELET # BLD AUTO: 187 10E9/L (ref 150–450)
POTASSIUM SERPL-SCNC: 3.6 MMOL/L (ref 3.4–5.3)
RBC # BLD AUTO: 4.8 10E12/L (ref 3.8–5.2)
RBC #/AREA URNS AUTO: 1 /HPF (ref 0–2)
SODIUM SERPL-SCNC: 139 MMOL/L (ref 133–144)
SOURCE: ABNORMAL
SP GR UR STRIP: 1.01 (ref 1–1.03)
SQUAMOUS #/AREA URNS AUTO: <1 /HPF (ref 0–1)
UROBILINOGEN UR STRIP-MCNC: 0 MG/DL (ref 0–2)
WBC # BLD AUTO: 3.9 10E9/L (ref 4–11)
WBC #/AREA URNS AUTO: 1 /HPF (ref 0–5)

## 2018-11-06 RX ORDER — ARIPIPRAZOLE 2 MG/1
2 TABLET ORAL DAILY
Qty: 30 TABLET | Refills: 3 | Status: SHIPPED | OUTPATIENT
Start: 2018-11-06 | End: 2019-02-12

## 2018-11-06 RX ORDER — PRAZOSIN HYDROCHLORIDE 5 MG/1
15 CAPSULE ORAL AT BEDTIME
Qty: 90 CAPSULE | Refills: 3 | Status: SHIPPED | OUTPATIENT
Start: 2018-11-06 | End: 2019-02-12

## 2018-11-06 RX ORDER — VILAZODONE HYDROCHLORIDE 40 MG/1
40 TABLET ORAL DAILY
Qty: 30 TABLET | Refills: 3 | Status: SHIPPED | OUTPATIENT
Start: 2018-11-06 | End: 2019-02-12

## 2018-11-06 ASSESSMENT — PAIN SCALES - GENERAL: PAINLEVEL: NO PAIN (0)
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
## 2018-11-06 NOTE — MR AVS SNAPSHOT
After Visit Summary   11/6/2018    Elizabeth Palma    MRN: [id number]           Patient Information     Date Of Birth          1955        Visit Information        Provider Department      11/6/2018 1:00 PM Chaparrita Hatch MD [facility] Psychiatry        Today's Diagnoses     Major depressive disorder, recurrent episode, moderate (H)        Nightmares associated with chronic post-traumatic stress disorder           Follow-ups after your visit        Follow-up notes from your care team     Return in about 4 weeks (around 12/4/2018) for 30 min. MFU.      Your next 10 appointments already scheduled     Nov 12, 2018 10:30 AM CST   (Arrive by 10:15 AM)   Return Weight Management Visit with Prakash Irene MD   [facility] ([facility])    [address] [place] [address]   [phone]            Nov 27, 2018  9:30 AM CST   (Arrive by 9:15 AM)   Return Weight Management Visit with Leyla Guerrero RD   [facility] ([facility])    [address] [place] [address]   [phone]            Nov 29, 2018 10:15 AM CST   (Arrive by 10:00 AM)   Return Visit with Caren Calderon MD   [facility]rologic C[facility] ([facility])    [address] [place] [address]   [phone]            Dec 24, 2018  9:30 AM CST   (Arrive by 9:15 AM)   Return Nutrition with Maisha Henderson RD   [facility] ([facility])    [address] [place] [address]   [phone]            Jan 14, 2019  1:00 PM CST   (Arrive by 12:45 PM)   RETURN FOOT/ANKLE with Javier Tuttle DPM   [facility] ([facility])    [address] [place] [address]    [phone]            Jan 28, 2019 10:00 AM CST   (Arrive by 9:45 AM)   Return Visit with Horacio Sheridan MD   [facility] ([facility])    [address]  4th [address]  [facility] [id number] [phone]            Mar 18, 2019  2:05 PM CDT   (Arrive by 1:35 PM)   Return Kidney Transplant with  Kidney/Pancreas Recipient 1   [facility] Nephrology ([facility])    [address]  [address]  [facility] [id number] [phone]              Who to contact     Please call your clinic at [phone] to:    Ask questions about your health    Make or cancel appointments    Discuss your medicines    Learn about your test results    Speak to your doctor            Additional Information About Your Visit        [employer] Information     [employer] gives you secure access to your electronic health record. If you see a primary care provider, you can also send messages to your care team and make appointments. If you have questions, please call your primary care clinic.  If you do not have a primary care provider, please call [phone] and they will assist you.      [employer] is an electronic gateway that provides easy, online access to your medical records. With [employer], you can request a clinic appointment, read your test results, renew a prescription or communicate with your care team.     To access your existing account, please contact your [facility] or call [phone] for assistance.        Care EveryWhere ID     This is your Care EveryWhere ID. This could be used by other organizations to access your [place] medical records  [id number]        Your Vitals Were     Pulse Respirations BMI (Body Mass Index)             92 16 28.17 kg/m2          Blood Pressure from Last 3 Encounters:   11/06/18 129/81   11/05/18 111/75   10/18/18 125/71    Weight from Last 3 Encounters:   11/06/18 67.7 kg (149 lb 3.2 oz)    11/05/18 66.9 kg (147 lb 8 oz)   10/30/18 66 kg (145 lb 9.6 oz)              Today, you had the following     No orders found for display         Today's Medication Changes          These changes are accurate as of 11/6/18  2:25 PM.  If you have any questions, ask your nurse or doctor.               These medicines have changed or have updated prescriptions.        Dose/Directions    cyanocobalamin 1000 MCG tablet   Commonly known as:  vitamin  B-12   This may have changed:  when to take this   Used for:  CKD (chronic kidney disease) stage 5, GFR less than 15 ml/min (H)        Dose:  1000 mcg   Take 1 tablet by mouth daily.   Quantity:  30 tablet   Refills:  0            Where to get your medicines      These medications were sent to [facility][id number] [place] [id number] [address]     Phone:  [phone]     ARIPiprazole 2 MG tablet         These medications were sent to [facility] - [place], [place] - [address] [facility] [address]  [address], [facility] [address]     Phone:  [phone]     prazosin 5 MG capsule    vilazodone 40 MG Tabs tablet                Primary Care Provider Office Phone # Fax #    Horacio Sheridan [phone]       [address] [place] [address] [address] [place] [address]        Equal Access to Services     LINNEA HIDALGO AH: Hadii aad ku hadasho Soomaali, waaxda luqadaha, qaybta kaalmada adeegyada, ty champion haymicah agrawal . So [facility] [phone].    ATENCIÓN: Si habla español, tiene a goetz disposición servicios gratuitos de asistencia lingüística. Rachel askew [phone].    We comply with applicable federal civil rights laws and [place] laws. We do not discriminate on the basis of race, color, national origin, age, disability, sex, sexual orientation, or gender identity.            Thank you!     Thank you for choosing [facility]  for your care. Our goal is always to provide you  with excellent care. Hearing back from our patients is one way we can continue to improve our services. Please take a few minutes to complete the written survey that you may receive in the mail after your visit with us. Thank you!             Your Updated Medication List - Protect others around you: Learn how to safely use, store and throw away your medicines at www.disposemymeds.org.          This list is accurate as of 11/6/18  2:25 PM.  Always use your most recent med list.                   Brand Name Dispense Instructions for use Diagnosis    acetylcysteine 600 MG Caps capsule    N-ACETYL CYSTEINE    30 capsule    Take 2 400 mg caps two times daily for total daily dose of 800 mg        albuterol 108 (90 Base) MCG/ACT inhaler    PROAIR HFA/PROVENTIL HFA/VENTOLIN HFA    1 Inhaler    Inhale 2 puffs into the lungs every 6 hours as needed for shortness of breath / dyspnea or wheezing    Exercise-induced asthma       ARIPiprazole 2 MG tablet    ABILIFY    30 tablet    Take 1 tablet (2 mg) by mouth daily    Major depressive disorder, recurrent episode, moderate (H)       aspirin 81 MG EC tablet     30 tablet    Take 1 tablet (81 mg) by mouth daily    Kidney replaced by transplant       blood glucose monitoring lancets     1 Box    Use to test blood sugar 2 times daily or as directed.    Type 2 diabetes mellitus with peripheral neuropathy (H)       * blood glucose monitoring meter device kit    no brand specified    1 kit    Use to test blood sugar 2 times daily or as directed.    Type 2 diabetes mellitus with peripheral neuropathy (H)       * blood glucose monitoring meter device kit           blood glucose monitoring test strip    no brand specified    100 strip    Use to test blood sugars 2 times daily or as directed    Type 2 diabetes mellitus with peripheral neuropathy (H)       cyanocobalamin 1000 MCG tablet    vitamin  B-12    30 tablet    Take 1 tablet by mouth daily.    CKD (chronic kidney disease) stage 5,  GFR less than 15 ml/min (H)       * cycloSPORINE modified 25 MG capsule    GENERIC EQUIVALENT    300 capsule    Take 5 capsules (125 mg) by mouth 2 times daily    Kidney replaced by transplant       * cycloSPORINE modified 25 MG capsule    GENERIC EQUIVALENT    300 capsule    TAKE 5 CAPSULES (125MG) BY MOUTH TWO TIMES A DAY    Kidney replaced by transplant       econazole nitrate 1 % cream     85 g    Apply topically daily    Type II or unspecified type diabetes mellitus with neurological manifestations, not stated as uncontrolled(250.60) (H), Dermatophytosis of foot       fluticasone 50 MCG/ACT spray    FLONASE    1 Bottle    Spray 1 spray into both nostrils daily    Seasonal allergic rhinitis, unspecified chronicity, unspecified trigger       furosemide 20 MG tablet    LASIX    90 tablet    Take 1 tablet (20 mg) by mouth daily    Generalized edema       latanoprost 0.005 % ophthalmic solution    XALATAN    7.5 mL    Place 1 drop into both eyes At Bedtime    Mild stage glaucoma(365.71)       metFORMIN 500 MG 24 hr tablet    GLUCOPHAGE-XR    90 tablet    Take 3 tablets (1,500 mg) by mouth daily (with dinner)    Type 2 diabetes mellitus with diabetic polyneuropathy, without long-term current use of insulin (H)       mycophenolate 250 MG capsule    GENERIC EQUIVALENT    240 capsule    Take 4 capsules (1,000 mg) by mouth 2 times daily    Kidney transplanted       omeprazole 40 MG capsule    priLOSEC    90 capsule    Take 1 capsule (40 mg) by mouth daily    Gastroesophageal reflux disease without esophagitis       ondansetron 4 MG ODT tab    ZOFRAN-ODT    20 tablet    Take 1 tablet (4 mg) by mouth every 6 hours as needed    Chronic kidney disease, stage V (H)       order for DME     1 Units    Walker with front wheels and a seat.    Fall, initial encounter       prazosin 5 MG capsule    MINIPRESS    90 capsule    Take 3 capsules (15 mg) by mouth At Bedtime    Nightmares associated with chronic post-traumatic stress  disorder       REFRESH OP      Apply to eye as needed Both eyes        replens Gel     35 g    Use vaginally as needed. Can use up to 3 times per week.    Vaginal dryness       simvastatin 20 MG tablet    ZOCOR    90 tablet    Take 1 tablet (20 mg) by mouth At Bedtime    Chronic kidney disease, stage V (H)       sulfamethoxazole-trimethoprim 400-80 MG per tablet    BACTRIM/SEPTRA    90 tablet    Take 1 tablet by mouth daily    Immunosuppression (H)       topiramate 200 MG tablet    TOPAMAX    60 tablet    Take 1 tablet (200 mg) by mouth 2 times daily    Morbid obesity due to excess calories (H)       TYLENOL 325 MG tablet   Generic drug:  acetaminophen      Take 325-650 mg by mouth as needed        vilazodone 40 MG Tabs tablet    VIIBRYD    30 tablet    Take 1 tablet (40 mg) by mouth daily    Major depressive disorder, recurrent episode, moderate (H)       vitamin D 1000 units capsule     30 capsule    2,000 Units        * Notice:  This list has 4 medication(s) that are the same as other medications prescribed for you. Read the directions carefully, and ask your doctor or other care provider to review them with you.

## 2018-11-06 NOTE — TELEPHONE ENCOUNTER
ISSUE:  Creatinine 1.04 back to pt baseline    OUTCOME:   Call placed to pt, no answer.  Left message letting pt know her creatinine back to baseline and she can resume her monthly lab schedule.

## 2018-11-07 RX ORDER — METFORMIN HCL 500 MG
1500 TABLET, EXTENDED RELEASE 24 HR ORAL
Qty: 90 TABLET | Refills: 0 | Status: SHIPPED | OUTPATIENT
Start: 2018-11-07 | End: 2018-12-05

## 2018-11-07 ASSESSMENT — PATIENT HEALTH QUESTIONNAIRE - PHQ9: SUM OF ALL RESPONSES TO PHQ QUESTIONS 1-9: 5

## 2018-11-12 ENCOUNTER — OFFICE VISIT (OUTPATIENT)
Dept: ENDOCRINOLOGY | Facility: CLINIC | Age: 63
End: 2018-11-12
Payer: MEDICARE

## 2018-11-12 VITALS
HEART RATE: 70 BPM | SYSTOLIC BLOOD PRESSURE: 131 MMHG | OXYGEN SATURATION: 99 % | BODY MASS INDEX: 27.83 KG/M2 | WEIGHT: 147.4 LBS | TEMPERATURE: 98.2 F | DIASTOLIC BLOOD PRESSURE: 76 MMHG | HEIGHT: 61 IN | RESPIRATION RATE: 18 BRPM

## 2018-11-12 DIAGNOSIS — E66.01 MORBID OBESITY DUE TO EXCESS CALORIES (H): ICD-10-CM

## 2018-11-12 RX ORDER — TOPIRAMATE 200 MG/1
200 TABLET, FILM COATED ORAL 2 TIMES DAILY
Qty: 60 TABLET | Refills: 5 | Status: SHIPPED | OUTPATIENT
Start: 2018-11-12 | End: 2019-07-05

## 2018-11-12 ASSESSMENT — PAIN SCALES - GENERAL: PAINLEVEL: NO PAIN (0)

## 2018-11-12 NOTE — MR AVS SNAPSHOT
After Visit Summary   11/12/2018    Elizabeth Palma    MRN: [id number]           Patient Information     Date Of Birth          1955        Visit Information        Provider Department      11/12/2018 10:30 AM Prakash Irene MD [facility] Medical Weight Management        Today's Diagnoses     Morbid obesity due to excess calories (H)           Follow-ups after your visit        Follow-up notes from your care team     Return in about 4 months (around 3/12/2019).      Your next 10 appointments already scheduled     Nov 27, 2018  9:00 AM CST   LAB with  LAB   [facility] ([facility])    [address]  [address] [facility] [id number] [phone]           Please do not eat 10-12 hours before your appointment if you are coming in fasting for labs on lipids, cholesterol, or glucose (sugar). This does not apply to pregnant women. Water, hot tea and black coffee (with nothing added) are okay. Do not drink other fluids, diet soda or chew gum.            Nov 27, 2018  9:30 AM CST   (Arrive by 9:15 AM)   Return Weight Management Visit with Leyla Guerrero RD   [facility] Management ([facility])    [address]  [address] [facility] [id number] [phone]            Nov 29, 2018 10:15 AM CST   (Arrive by 10:00 AM)   Return Visit with Caren Calderon MD   [facility] Inst for Prostate and Urologic Cancers ([facility])    [address]  [address] [facility] [id number] [phone]            Nov 29, 2018  1:00 PM CST   (Arrive by 12:45 PM)   INJECTION with  Oncology Injection Nurse   [facility] ([facility])    [address]  [address]  [facility] [id number] [phone]            Dec 24, 2018  9:30 AM CST   (Arrive by 9:15 AM)   Return Nutrition with Maisha Henderson RD   [facility]  Gastroenterology and IBD Clinic ([facility])    [address]  [address] [facility] [address]   [phone]            Jan 14, 2019  1:00 PM CST   (Arrive by 12:45 PM)   [facility]    [address]  [address] [facility] [address]   [phone]            Jan 28, 2019 10:00 AM CST   (Arrive by 9:45 AM)   Return Visit with Horacio Sheridan MD   [facility] ([facility])    [address]  [address] [facility] [address] [phone]            Mar 18, 2019  2:05 PM CDT   (Arrive by 1:35 PM)   Return Kidney Transplant with Uc Kidney/Pancreas Recipient 1   [facility] Nephrology ([facility])    [address]  [address]  [facility] [address]   [phone]            Mar 25, 2019 10:30 AM CDT   (Arrive by 10:15 AM)   Return Weight Management Visit with Prakash Irene MD   [facility] ([facility])    [address]  [address] [facility] [address] [phone]              Who to contact     Please call your clinic at [phone] to:    Ask questions about your health    Make or cancel appointments    Discuss your medicines    Learn about your test results    Speak to your doctor            Additional Information About Your Visit        [employer] Information     [employer] gives you secure access to your electronic health record. If you see a primary care provider, you can also send messages to your care team and make appointments. If you have questions, please call your primary care clinic.  If you do not have a primary care provider, please call [phone] and they will assist you.      [employer] is an electronic gateway that provides easy, online access to your medical records. With [employer], you can request a clinic  "appointment, read your test results, renew a prescription or communicate with your care team.     To access your existing account, please contact your [facility] or call [phone] for assistance.        Care EveryWhere ID     This is your Care EveryWhere ID. This could be used by other organizations to access your [place] medical records  [id number]        Your Vitals Were     Pulse Temperature Respirations Height Pulse Oximetry BMI (Body Mass Index)    70 98.2  F (36.8  C) (Oral) 18 1.549 m (5' 1\") 99% 27.85 kg/m2       Blood Pressure from Last 3 Encounters:   No data found for BP    Weight from Last 3 Encounters:   No data found for Wt              Today, you had the following     No orders found for display         Today's Medication Changes          These changes are accurate as of 11/12/18 11:59 PM.  If you have any questions, ask your nurse or doctor.               These medicines have changed or have updated prescriptions.        Dose/Directions    cyanocobalamin 1000 MCG tablet   Commonly known as:  vitamin  B-12   This may have changed:  when to take this   Used for:  CKD (chronic kidney disease) stage 5, GFR less than 15 ml/min (H)        Dose:  1000 mcg   Take 1 tablet by mouth daily.   Quantity:  30 tablet   Refills:  0            Where to get your medicines      These [facility] - [place] - [address] AT [facility] [address]  [address], [place] [address]     Phone:  [phone]     topiramate 200 MG tablet                Primary Care Provider Office Phone # Fax #    Horacio Sheridan [phone]       [address] [place] [address] [address]  [facility] [address]        Equal Access to Services     LUCIO HIDALGO : Jennifer Quevedo, radha aleman, ty perla. So [facility] [phone].    ATENCIÓN: Si habla español, tiene a goetz disposición servicios " jany de asistencia lingüística. Rachel askew [phone].    We comply with applicable federal civil rights laws and [place] laws. We do not discriminate on the basis of race, color, national origin, age, disability, sex, sexual orientation, or gender identity.            Thank you!     Thank you for choosing [facility]  for your care. Our goal is always to provide you with excellent care. Hearing back from our patients is one way we can continue to improve our services. Please take a few minutes to complete the written survey that you may receive in the mail after your visit with us. Thank you!             Your Updated Medication List - Protect others around you: Learn how to safely use, store and throw away your medicines at www.disposemymeds.org.          This list is accurate as of 11/12/18 11:59 PM.  Always use your most recent med list.                   Brand Name Dispense Instructions for use Diagnosis    acetylcysteine 600 MG Caps capsule    N-ACETYL CYSTEINE    30 capsule    Take 2 400 mg caps two times daily for total daily dose of 800 mg        albuterol 108 (90 Base) MCG/ACT inhaler    PROAIR HFA/PROVENTIL HFA/VENTOLIN HFA    1 Inhaler    Inhale 2 puffs into the lungs every 6 hours as needed for shortness of breath / dyspnea or wheezing    Exercise-induced asthma       ARIPiprazole 2 MG tablet    ABILIFY    30 tablet    Take 1 tablet (2 mg) by mouth daily    Major depressive disorder, recurrent episode, moderate (H)       aspirin 81 MG EC tablet     30 tablet    Take 1 tablet (81 mg) by mouth daily    Kidney replaced by transplant       blood glucose monitoring lancets     1 Box    Use to test blood sugar 2 times daily or as directed.    Type 2 diabetes mellitus with peripheral neuropathy (H)       * blood glucose monitoring meter device kit    no brand specified    1 kit    Use to test blood sugar 2 times daily or as directed.    Type 2 diabetes mellitus with peripheral  neuropathy (H)       * blood glucose monitoring meter device kit           blood glucose monitoring test strip    no brand specified    100 strip    Use to test blood sugars 2 times daily or as directed    Type 2 diabetes mellitus with peripheral neuropathy (H)       cyanocobalamin 1000 MCG tablet    vitamin  B-12    30 tablet    Take 1 tablet by mouth daily.    CKD (chronic kidney disease) stage 5, GFR less than 15 ml/min (H)       * cycloSPORINE modified 25 MG capsule    GENERIC EQUIVALENT    300 capsule    Take 5 capsules (125 mg) by mouth 2 times daily    Kidney replaced by transplant       * cycloSPORINE modified 25 MG capsule    GENERIC EQUIVALENT    300 capsule    TAKE 5 CAPSULES (125MG) BY MOUTH TWO TIMES A DAY    Kidney replaced by transplant       econazole nitrate 1 % cream     85 g    Apply topically daily    Type II or unspecified type diabetes mellitus with neurological manifestations, not stated as uncontrolled(250.60) (H), Dermatophytosis of foot       fluticasone 50 MCG/ACT spray    FLONASE    1 Bottle    Spray 1 spray into both nostrils daily    Seasonal allergic rhinitis, unspecified chronicity, unspecified trigger       furosemide 20 MG tablet    LASIX    90 tablet    Take 1 tablet (20 mg) by mouth daily    Generalized edema       latanoprost 0.005 % ophthalmic solution    XALATAN    7.5 mL    Place 1 drop into both eyes At Bedtime    Mild stage glaucoma(365.71)       metFORMIN 500 MG 24 hr tablet    GLUCOPHAGE-XR    90 tablet    Take 3 tablets (1,500 mg) by mouth daily (with dinner) Patient needs to have labs for further refills.    Type 2 diabetes mellitus with diabetic polyneuropathy, without long-term current use of insulin (H)       mycophenolate 250 MG capsule    GENERIC EQUIVALENT    240 capsule    Take 4 capsules (1,000 mg) by mouth 2 times daily    Kidney transplanted       omeprazole 40 MG capsule    priLOSEC    90 capsule    Take 1 capsule (40 mg) by mouth daily    Gastroesophageal  reflux disease without esophagitis       ondansetron 4 MG ODT tab    ZOFRAN-ODT    20 tablet    Take 1 tablet (4 mg) by mouth every 6 hours as needed    Chronic kidney disease, stage V (H)       order for DME     1 Units    Walker with front wheels and a seat.    Fall, initial encounter       prazosin 5 MG capsule    MINIPRESS    90 capsule    Take 3 capsules (15 mg) by mouth At Bedtime    Nightmares associated with chronic post-traumatic stress disorder       REFRESH OP      Apply to eye as needed Both eyes        replens Gel     35 g    Use vaginally as needed. Can use up to 3 times per week.    Vaginal dryness       simvastatin 20 MG tablet    ZOCOR    90 tablet    Take 1 tablet (20 mg) by mouth At Bedtime    Chronic kidney disease, stage V (H)       sulfamethoxazole-trimethoprim 400-80 MG per tablet    BACTRIM/SEPTRA    90 tablet    Take 1 tablet by mouth daily    Immunosuppression (H)       topiramate 200 MG tablet    TOPAMAX    60 tablet    Take 1 tablet (200 mg) by mouth 2 times daily    Morbid obesity due to excess calories (H)       TYLENOL 325 MG tablet   Generic drug:  acetaminophen      Take 325-650 mg by mouth as needed        vilazodone 40 MG Tabs tablet    VIIBRYD    30 tablet    Take 1 tablet (40 mg) by mouth daily    Major depressive disorder, recurrent episode, moderate (H)       vitamin D 1000 units capsule     30 capsule    2,000 Units        * Notice:  This list has 4 medication(s) that are the same as other medications prescribed for you. Read the directions carefully, and ask your doctor or other care provider to review them with you.

## 2018-11-12 NOTE — NURSING NOTE
"Chief Complaint   Patient presents with     Weight Problem     PT here for weight management follow up       Vitals:    11/12/18 1100   BP: 131/76   BP Location: Left arm   Patient Position: Sitting   Cuff Size: Adult Regular   Pulse: 70   Resp: 18   Temp: 98.2  F (36.8  C)   TempSrc: Oral   SpO2: 99%   Weight: 147 lb 6.4 oz   Height: 5' 1\"       Body mass index is 27.85 kg/(m^2).      ALHAJI Still, EMT                      "

## 2018-11-12 NOTE — PROGRESS NOTES
Return Medical Weight Management Note     Elizabeth Palma  MRN:  [id number]  :  1955  AYSE:  18    Dear Dr. Sheridan,      I had the pleasure of seeing your patient Elizabeth Palma.  She is a 60 year old female who I am continuing to see for treatment of obesity related to: DM-2, Hyperlipidemia, Sleep Apnea (has CPAP and does not use), GERD and Depression.    CURRENT WEIGHT:   147 lbs 6.4 oz    Wt Readings from Last 4 Encounters:   18 66.9 kg (147 lb 6.4 oz)   18 67.7 kg (149 lb 3.2 oz)   18 66.9 kg (147 lb 8 oz)   10/30/18 66 kg (145 lb 9.6 oz)     Body Mass Index:  Body mass index is 27.85 kg/(m^2).  Vitals:  B/P: 119/79, P: 60    Initial consult weight was 214 on 2015.  Weight change since last seen on 2018 is down 5 pounds.   Total loss is 67 pounds.    INTERVAL HISTORY:  Topiramate 200 AM and 200 HS along with off gabapentin, and pain is ok. Fewer problems with evening and night eating.     Diet and Activity Changes Since Last Visit Reviewed With Patient 2018   I have made the following changes to my diet since my last visit: Drink more water    With regards to my diet, I am still struggling with: Protein    For breakfast, I typically eat: -   For lunch, I typically eat: -   For supper, I typically eat: -   For snack(s), I typically eat: -   I have made the following changes to my activity/exercise since my last visit: -   With regards to my activity/exercise, I am still struggling with: -     MEDICATIONS:   Current Outpatient Prescriptions   Medication     acetaminophen (TYLENOL) 325 MG tablet     acetylcysteine (N-ACETYL-L-CYSTEINE) 600 MG CAPS capsule     albuterol (PROAIR HFA/PROVENTIL HFA/VENTOLIN HFA) 108 (90 Base) MCG/ACT inhaler     ARIPiprazole (ABILIFY) 2 MG tablet     aspirin EC 81 MG EC tablet     blood glucose monitoring (ACCU-CHEK RALPH PLUS) meter device kit     blood glucose monitoring (NO BRAND SPECIFIED) meter device kit     blood glucose  monitoring (NO BRAND SPECIFIED) test strip     blood glucose monitoring (SOFTCLIX) lancets     Cholecalciferol (VITAMIN D) 1000 UNITS capsule     cyanocolbalamin (VITAMIN  B-12) 1000 MCG tablet     cycloSPORINE modified (GENERIC EQUIVALENT) 25 MG capsule     cycloSPORINE modified (GENERIC EQUIVALENT) 25 MG capsule     econazole nitrate 1 % cream     fluticasone (FLONASE) 50 MCG/ACT spray     furosemide (LASIX) 20 MG tablet     latanoprost (XALATAN) 0.005 % ophthalmic solution     metFORMIN (GLUCOPHAGE-XR) 500 MG 24 hr tablet     mycophenolate (GENERIC EQUIVALENT) 250 MG capsule     omeprazole (PRILOSEC) 40 MG capsule     ondansetron (ZOFRAN-ODT) 4 MG ODT tab     order for DME     Polyvinyl Alcohol-Povidone (REFRESH OP)     prazosin (MINIPRESS) 5 MG capsule     simvastatin (ZOCOR) 20 MG tablet     sulfamethoxazole-trimethoprim (BACTRIM/SEPTRA) 400-80 MG per tablet     topiramate (TOPAMAX) 200 MG tablet     Vaginal Lubricant (REPLENS) GEL     vilazodone (VIIBRYD) 40 MG TABS tablet     No current facility-administered medications for this visit.        Weight Loss Medication History Reviewed With Patient 11/5/2018   Which weight loss medications are you currently taking on a regular basis?  Topamax (topiramate)   Are you having any side effects from the weight loss medication that we have prescribed you? Yes   If you are having side effects please describe: Tired     ASSESSMENT:   Good progress again. Maintain topiramate 200 morning and evening. Remains off gabapentin with acceptable neuropathy pain control.    FOLLOW-UP:    6 months.  10/15 minutes spent on counseling and education    Sincerely,    Prakash Irene MD

## 2018-11-12 NOTE — LETTER
2018       RE: Elizabeth Palma  [address]  [facility] [id number]     Dear Colleague,    Thank you for referring your patient, Elizabeth Palma, to the [facility] MEDICAL WEIGHT MANAGEMENT at [facility]. Please see a copy of my visit note below.        Return Medical Weight Management Note     Elizabeth Palma  MRN:  [id number]  :  1955  AYSE:  18    Dear Dr. Sheridan,      I had the pleasure of seeing your patient Elizabeth Palma.  She is a 60 year old female who I am continuing to see for treatment of obesity related to: DM-2, Hyperlipidemia, Sleep Apnea (has CPAP and does not use), GERD and Depression.    CURRENT WEIGHT:   147 lbs 6.4 oz    Wt Readings from Last 4 Encounters:   18 66.9 kg (147 lb 6.4 oz)   18 67.7 kg (149 lb 3.2 oz)   18 66.9 kg (147 lb 8 oz)   10/30/18 66 kg (145 lb 9.6 oz)     Body Mass Index:  Body mass index is 27.85 kg/(m^2).  Vitals:  B/P: 119/79, P: 60    Initial consult weight was 214 on 2015.  Weight change since last seen on 2018 is down 5 pounds.   Total loss is 67 pounds.    INTERVAL HISTORY:  Topiramate 200 AM and 200 HS along with off gabapentin, and pain is ok. Fewer problems with evening and night eating.     Diet and Activity Changes Since Last Visit Reviewed With Patient 2018   I have made the following changes to my diet since my last visit: Drink more water    With regards to my diet, I am still struggling with: Protein    For breakfast, I typically eat: -   For lunch, I typically eat: -   For supper, I typically eat: -   For snack(s), I typically eat: -   I have made the following changes to my activity/exercise since my last visit: -   With regards to my activity/exercise, I am still struggling with: -     MEDICATIONS:   Current Outpatient Prescriptions   Medication     acetaminophen (TYLENOL) 325 MG tablet     acetylcysteine (N-ACETYL-L-CYSTEINE) 600 MG CAPS capsule      albuterol (PROAIR HFA/PROVENTIL HFA/VENTOLIN HFA) 108 (90 Base) MCG/ACT inhaler     ARIPiprazole (ABILIFY) 2 MG tablet     aspirin EC 81 MG EC tablet     blood glucose monitoring (ACCU-CHEK RALPH PLUS) meter device kit     blood glucose monitoring (NO BRAND SPECIFIED) meter device kit     blood glucose monitoring (NO BRAND SPECIFIED) test strip     blood glucose monitoring (SOFTCLIX) lancets     Cholecalciferol (VITAMIN D) 1000 UNITS capsule     cyanocolbalamin (VITAMIN  B-12) 1000 MCG tablet     cycloSPORINE modified (GENERIC EQUIVALENT) 25 MG capsule     cycloSPORINE modified (GENERIC EQUIVALENT) 25 MG capsule     econazole nitrate 1 % cream     fluticasone (FLONASE) 50 MCG/ACT spray     furosemide (LASIX) 20 MG tablet     latanoprost (XALATAN) 0.005 % ophthalmic solution     metFORMIN (GLUCOPHAGE-XR) 500 MG 24 hr tablet     mycophenolate (GENERIC EQUIVALENT) 250 MG capsule     omeprazole (PRILOSEC) 40 MG capsule     ondansetron (ZOFRAN-ODT) 4 MG ODT tab     order for DME     Polyvinyl Alcohol-Povidone (REFRESH OP)     prazosin (MINIPRESS) 5 MG capsule     simvastatin (ZOCOR) 20 MG tablet     sulfamethoxazole-trimethoprim (BACTRIM/SEPTRA) 400-80 MG per tablet     topiramate (TOPAMAX) 200 MG tablet     Vaginal Lubricant (REPLENS) GEL     vilazodone (VIIBRYD) 40 MG TABS tablet     No current facility-administered medications for this visit.        Weight Loss Medication History Reviewed With Patient 11/5/2018   Which weight loss medications are you currently taking on a regular basis?  Topamax (topiramate)   Are you having any side effects from the weight loss medication that we have prescribed you? Yes   If you are having side effects please describe: Tired     ASSESSMENT:   Good progress again. Maintain topiramate 200 morning and evening. Remains off gabapentin with acceptable neuropathy pain control.    FOLLOW-UP:    6 months.  10/15 minutes spent on counseling and education      Prakash Irene  MD

## 2018-11-13 ENCOUNTER — TELEPHONE (OUTPATIENT)
Dept: ENDOCRINOLOGY | Facility: CLINIC | Age: 63
End: 2018-11-13

## 2018-11-13 PROBLEM — Z92.29 PERSONAL HISTORY OF OTHER DRUG THERAPY: Status: ACTIVE | Noted: 2018-11-13

## 2018-11-21 DIAGNOSIS — D84.9 IMMUNOSUPPRESSION (H): ICD-10-CM

## 2018-11-21 DIAGNOSIS — Z94.0 KIDNEY TRANSPLANTED: Primary | ICD-10-CM

## 2018-11-21 RX ORDER — MYCOPHENOLATE MOFETIL 250 MG/1
1000 CAPSULE ORAL 2 TIMES DAILY
Qty: 240 CAPSULE | Refills: 11 | Status: SHIPPED | OUTPATIENT
Start: 2018-11-21 | End: 2019-11-12

## 2018-11-21 RX ORDER — SULFAMETHOXAZOLE AND TRIMETHOPRIM 400; 80 MG/1; MG/1
1 TABLET ORAL DAILY
Qty: 90 TABLET | Refills: 3 | Status: SHIPPED | OUTPATIENT
Start: 2018-11-21 | End: 2019-10-22

## 2018-11-21 NOTE — TELEPHONE ENCOUNTER
cellcept 250mg (proactive refill request)  Last FIlled Date 11/9   Qty dispensed 240    Thank you very kindly!  Amarilys Seymour CPhT  [facility]

## 2018-11-23 ENCOUNTER — PRE VISIT (OUTPATIENT)
Dept: UROLOGY | Facility: CLINIC | Age: 63
End: 2018-11-23

## 2018-11-23 NOTE — TELEPHONE ENCOUNTER
Reason for visit: Review imaging and discuss bladder diary     Relevant information: urinary urgency and incontinence    Records/imaging/labs: all records available    Pt called: yes, generic message left    Rooming: regular

## 2018-11-27 ENCOUNTER — OFFICE VISIT (OUTPATIENT)
Dept: SURGERY | Facility: CLINIC | Age: 63
End: 2018-11-27
Payer: MEDICARE

## 2018-11-27 VITALS — BODY MASS INDEX: 28.08 KG/M2 | WEIGHT: 148.6 LBS

## 2018-11-27 DIAGNOSIS — Z48.298 AFTERCARE FOLLOWING ORGAN TRANSPLANT: ICD-10-CM

## 2018-11-27 DIAGNOSIS — E11.42 TYPE 2 DIABETES MELLITUS WITH DIABETIC POLYNEUROPATHY, WITHOUT LONG-TERM CURRENT USE OF INSULIN (H): ICD-10-CM

## 2018-11-27 LAB
ANION GAP SERPL CALCULATED.3IONS-SCNC: 8 MMOL/L (ref 3–14)
BUN SERPL-MCNC: 16 MG/DL (ref 7–30)
CALCIUM SERPL-MCNC: 9 MG/DL (ref 8.5–10.1)
CHLORIDE SERPL-SCNC: 111 MMOL/L (ref 94–109)
CO2 SERPL-SCNC: 22 MMOL/L (ref 20–32)
CREAT SERPL-MCNC: 0.98 MG/DL (ref 0.52–1.04)
CREAT UR-MCNC: 170 MG/DL
CYCLOSPORINE BLD LC/MS/MS-MCNC: 98 UG/L (ref 50–400)
ERYTHROCYTE [DISTWIDTH] IN BLOOD BY AUTOMATED COUNT: 16.2 % (ref 10–15)
GFR SERPL CREATININE-BSD FRML MDRD: 58 ML/MIN/1.7M2
GLUCOSE SERPL-MCNC: 160 MG/DL (ref 70–99)
HBA1C MFR BLD: 7.4 % (ref 0–5.6)
HCT VFR BLD AUTO: 39.1 % (ref 35–47)
HGB BLD-MCNC: 11.8 G/DL (ref 11.7–15.7)
MCH RBC QN AUTO: 24.4 PG (ref 26.5–33)
MCHC RBC AUTO-ENTMCNC: 30.2 G/DL (ref 31.5–36.5)
MCV RBC AUTO: 81 FL (ref 78–100)
MICROALBUMIN UR-MCNC: 20 MG/L
MICROALBUMIN/CREAT UR: 12 MG/G CR (ref 0–25)
PLATELET # BLD AUTO: 171 10E9/L (ref 150–450)
POTASSIUM SERPL-SCNC: 3.9 MMOL/L (ref 3.4–5.3)
RBC # BLD AUTO: 4.83 10E12/L (ref 3.8–5.2)
SODIUM SERPL-SCNC: 141 MMOL/L (ref 133–144)
TME LAST DOSE: NORMAL H
WBC # BLD AUTO: 3 10E9/L (ref 4–11)

## 2018-11-27 NOTE — LETTER
"11/27/2018       RE: Elizabeth Palma  [address]  [place] [id number]     Dear Colleague,    Thank you for referring your patient, Elizabeth Palma, to the [facility] SURGICAL WEIGHT MANAGEMENT at [facility]. Please see a copy of my visit note below.    Nutrition Reassessment  Reason For Visit:  Elizabeth Palma is a 63 year-old female with type 2 DM (oral med management) presenting today for nutrition follow-up, s/p \"gastric bypass in 1990 at [facility]\" per Pt report.  She is seeing medical weight management.  She was referred by Dr. Irene.  Note: Pt had a kidney transplant on March 20, 2014.    Pt was accompanied by her .     Anthropometrics:  Height: 61\"  Pre-op Weight: 265 lbs per Pt report; lowest weight after bariatric surgery: 165-170 lbs (25 years ago)  (Pt reported that she initially was at 215 lbs in 2015 prior to seeing writer.)    Current Weight: 148.6 lbs (+3 lbs over the past month) with BMI of 28.08 kg/m^2.    Current Vitamins/Minerals: 3000 International Units vitamin D/day, Calcium 2x daily, vitamin C, vitamin B12 daily, B-complex  *Pt stated that she was told not to take other vitamins/minerals by MD.    Nutrition History:  Lactose intolerance ever since bariatric surgery.  Pt stated that she has osteoporosis; however, she stated that her doctors don't want her to take any vitamins or minerals due to her kidney transplant.    Diet/Nutrition Intake Hx: Pt states that she has been consuming 3 meals daily but will eat very small portions and will occasionally have a snack but she states she has stopped consuming so many potato chips. Pt also states her intake is affected by nausea 2/2 her anti-rejection medications. She reports that last night she had an episode of emesis after consuming her lasagna. However pt reports she tries to make healthy choices (lower in calorie). Pt is also limited in protein choices that she likes - pt reports " she does not take much dairy, does not like beans and lentils, keeps kosher. Advised pt to try to time her meals and eat every 4 hours instead of grazing but pt refused stating she would rather not eat at all.      *Per previous RD note: Pt stated that she will not record food intake due to the fact that every time she does this, she simply does not eat as she doesn't want to record.  She stated that her therapist strongly advises against recording food intake for this reason.    Physical Activity Note: Pt reports she has a tendency to break bones so she is limited with what exercises she does. Pt states there is a long hallway in the building that she needs to walk when going out. Pt states she does not walk for exercises but walks daily down hallways. She notes that she is typically tired, reporting that sometimes when she is bored she will fall asleep.     Recent food recall(11/27/18):  Half bagel with cream cheese with pumpkin seeds  Cheese  cuties  Sunflower butter    Progress with Previous Goals:    1. Avoid grazing through, Eat 3 meals with lean protein at each meal, along with a non-starchy vegetable or whole fruit, up to 1/2 c carb at a meal. Continues to struggle to eat meals regularly   -Try hard-boiled eggs to put on your salad or to have later in the day to make up for skipping breakfast.    2. If needing a snack, have vegetables or protein snack (give at least 2 hours between a meals and a snack). nibbling on food throughout the day  3. Walk 10 min daily, increasing as able. Continues to walk but noting scheduled    Nutrition Prescription:  Grams Protein: 60 (minimum) - Not meeting consistently.   Amount of Fluid: 48-64 oz    Nutrition Diagnosis  Previous: Overweight related to history of excessive energy intake as evidenced by BMI > 25. - continues    Intervention  Intervention At Appointment:  Materials/Education provided:  Discussed importance of eating regular meals, she reported that she will  often not eat dinner due to how late in the evening it is and then does not feel like making a meal.  Patient also noted struggling with not eating due to being upset with her  and how long it takes him to eat a meal.   stated that her  was recently diagnosed with dementia and will take up to 2 hours to eat and often forgets he is eating and this is causing her to become upset and then not want to eat.  Elizabeth stated she wants to try and start planning dinner meals ahead of time to make that meal time easier and if her  does not like the meal he will eat a peanut butter sandwich.  Encouraged continuing to get protein at meals and exercise as able.  Reviewed previous goals.  Gave encouragement and support.    *Note: Purchased the protein chips again from clinic today. RD asked if more protein bars were needed and she stated no she does not need more protein bars at this time.       Patient Understanding: good  Expected Compliance: good with continued RD follow up.    Goals:   1. Avoid grazing through, Eat 3 meals with lean protein at each meal, along with a non-starchy vegetable or whole fruit, up to 1/2 c carb at a meal.   -Try hard-boiled eggs to put on your salad or to have later in the day to make up for skipping breakfast.    2. If needing a snack, have vegetables or protein snack (give at least 2 hours between a meals and a snack).  3. Walk 10 min daily, increasing as able.  4. Meal planning for dinner     Follow-Up: 1 month or PRN    Time spent with patient: 30 minutes.      Again, thank you for allowing me to participate in the care of your patient.      Sincerely,    Leyla Guerrero RD

## 2018-11-27 NOTE — PATIENT INSTRUCTIONS
Goals:   1. Avoid grazing through, Eat 3 meals with lean protein at each meal, along with a non-starchy vegetable or whole fruit, up to 1/2 c carb at a meal.   -Try hard-boiled eggs to put on your salad or to have later in the day to make up for skipping breakfast.    2. If needing a snack, have vegetables or protein snack (give at least 2 hours between a meals and a snack).  3. Walk 10 min daily, increasing as able.  4. Meal planning for dinner

## 2018-11-27 NOTE — PROGRESS NOTES
"Nutrition Reassessment  Reason For Visit:  Elizabeth Palma is a 63 year-old female with type 2 DM (oral med management) presenting today for nutrition follow-up, s/p \"gastric bypass in 1990 at [facility]\" per Pt report.  She is seeing medical weight management.  She was referred by Dr. Irene.  Note: Pt had a kidney transplant on March 20, 2014.    Pt was accompanied by her .     Anthropometrics:  Height: 61\"  Pre-op Weight: 265 lbs per Pt report; lowest weight after bariatric surgery: 165-170 lbs (25 years ago)  (Pt reported that she initially was at 215 lbs in 2015 prior to seeing writer.)    Current Weight: 148.6 lbs (+3 lbs over the past month) with BMI of 28.08 kg/m^2.    Current Vitamins/Minerals: 3000 International Units vitamin D/day, Calcium 2x daily, vitamin C, vitamin B12 daily, B-complex  *Pt stated that she was told not to take other vitamins/minerals by MD.    Nutrition History:  Lactose intolerance ever since bariatric surgery.  Pt stated that she has osteoporosis; however, she stated that her doctors don't want her to take any vitamins or minerals due to her kidney transplant.    Diet/Nutrition Intake Hx: Pt states that she has been consuming 3 meals daily but will eat very small portions and will occasionally have a snack but she states she has stopped consuming so many potato chips. Pt also states her intake is affected by nausea 2/2 her anti-rejection medications. She reports that last night she had an episode of emesis after consuming her lasagna. However pt reports she tries to make healthy choices (lower in calorie). Pt is also limited in protein choices that she likes - pt reports she does not take much dairy, does not like beans and lentils, keeps kosher. Advised pt to try to time her meals and eat every 4 hours instead of grazing but pt refused stating she would rather not eat at all.      *Per previous RD note: Pt stated that she will not record food intake due to the fact that " every time she does this, she simply does not eat as she doesn't want to record.  She stated that her therapist strongly advises against recording food intake for this reason.    Physical Activity Note: Pt reports she has a tendency to break bones so she is limited with what exercises she does. Pt states there is a long hallway in the building that she needs to walk when going out. Pt states she does not walk for exercises but walks daily down hallways. She notes that she is typically tired, reporting that sometimes when she is bored she will fall asleep.     Recent food recall(11/27/18):  Half bagel with cream cheese with pumpkin seeds  Cheese  cuties  Sunflower butter    Progress with Previous Goals:    1. Avoid grazing through, Eat 3 meals with lean protein at each meal, along with a non-starchy vegetable or whole fruit, up to 1/2 c carb at a meal. Continues to struggle to eat meals regularly   -Try hard-boiled eggs to put on your salad or to have later in the day to make up for skipping breakfast.    2. If needing a snack, have vegetables or protein snack (give at least 2 hours between a meals and a snack). nibbling on food throughout the day  3. Walk 10 min daily, increasing as able. Continues to walk but noting scheduled    Nutrition Prescription:  Grams Protein: 60 (minimum) - Not meeting consistently.   Amount of Fluid: 48-64 oz    Nutrition Diagnosis  Previous: Overweight related to history of excessive energy intake as evidenced by BMI > 25. - continues    Intervention  Intervention At Appointment:  Materials/Education provided:  Discussed importance of eating regular meals, she reported that she will often not eat dinner due to how late in the evening it is and then does not feel like making a meal.  Patient also noted struggling with not eating due to being upset with her  and how long it takes him to eat a meal.  Se stated that her  was recently diagnosed with dementia and will take up to  2 hours to eat and often forgets he is eating and this is causing her to become upset and then not want to eat.  Elizabeth stated she wants to try and start planning dinner meals ahead of time to make that meal time easier and if her  does not like the meal he will eat a peanut butter sandwich.  Encouraged continuing to get protein at meals and exercise as able.  Reviewed previous goals.  Gave encouragement and support.    *Note: Purchased the protein chips again from clinic today. RD asked if more protein bars were needed and she stated no she does not need more protein bars at this time.       Patient Understanding: good  Expected Compliance: good with continued RD follow up.    Goals:   1. Avoid grazing through, Eat 3 meals with lean protein at each meal, along with a non-starchy vegetable or whole fruit, up to 1/2 c carb at a meal.   -Try hard-boiled eggs to put on your salad or to have later in the day to make up for skipping breakfast.    2. If needing a snack, have vegetables or protein snack (give at least 2 hours between a meals and a snack).  3. Walk 10 min daily, increasing as able.  4. Meal planning for dinner     Follow-Up: 1 month or PRN    Time spent with patient: 30 minutes.  Leyla Guerrero, RD, LD

## 2018-11-29 ENCOUNTER — ALLIED HEALTH/NURSE VISIT (OUTPATIENT)
Dept: ONCOLOGY | Facility: CLINIC | Age: 63
End: 2018-11-29
Attending: INTERNAL MEDICINE
Payer: MEDICARE

## 2018-11-29 ENCOUNTER — OFFICE VISIT (OUTPATIENT)
Dept: UROLOGY | Facility: CLINIC | Age: 63
End: 2018-11-29
Payer: MEDICARE

## 2018-11-29 VITALS
SYSTOLIC BLOOD PRESSURE: 132 MMHG | HEIGHT: 61 IN | WEIGHT: 145 LBS | DIASTOLIC BLOOD PRESSURE: 77 MMHG | BODY MASS INDEX: 27.38 KG/M2 | HEART RATE: 61 BPM

## 2018-11-29 VITALS — RESPIRATION RATE: 18 BRPM | OXYGEN SATURATION: 98 % | HEART RATE: 76 BPM | TEMPERATURE: 98.2 F

## 2018-11-29 DIAGNOSIS — Z94.0 DECEASED-DONOR KIDNEY TRANSPLANT: ICD-10-CM

## 2018-11-29 DIAGNOSIS — M81.0 AGE-RELATED OSTEOPOROSIS WITHOUT CURRENT PATHOLOGICAL FRACTURE: ICD-10-CM

## 2018-11-29 DIAGNOSIS — Q61.2 AUTOSOMAL DOMINANT POLYCYSTIC KIDNEY DISEASE: ICD-10-CM

## 2018-11-29 DIAGNOSIS — Z92.29 PERSONAL HISTORY OF OTHER DRUG THERAPY: Primary | ICD-10-CM

## 2018-11-29 DIAGNOSIS — Z87.440 PERSONAL HISTORY OF URINARY TRACT INFECTION: Primary | ICD-10-CM

## 2018-11-29 DIAGNOSIS — M81.0 SENILE OSTEOPOROSIS: ICD-10-CM

## 2018-11-29 PROCEDURE — 25000128 H RX IP 250 OP 636: Mod: ZF | Performed by: INTERNAL MEDICINE

## 2018-11-29 PROCEDURE — 96372 THER/PROPH/DIAG INJ SC/IM: CPT

## 2018-11-29 RX ADMIN — DENOSUMAB 60 MG: 60 INJECTION SUBCUTANEOUS at 14:16

## 2018-11-29 ASSESSMENT — PAIN SCALES - GENERAL
PAINLEVEL: EXTREME PAIN (8)
PAINLEVEL: NO PAIN (0)

## 2018-11-29 NOTE — NURSING NOTE
Chief Complaint   Patient presents with     RECHECK     Review imaging and discuss bladder diary                  Khushboo Rondon MA

## 2018-11-29 NOTE — MR AVS SNAPSHOT
After Visit Summary   11/29/2018    Elizabeth Palma    MRN: [id number]           Patient Information     Date Of Birth          1955        Visit Information        Provider Department      11/29/2018 10:15 AM Caren Calderon MD [facility] Prostate and Urologic Cancers        Care Instructions    Let us know if the symptoms return or if you think you have another urinary infection [phone] or [phone]    Return to see us in about 6 months, sooner if needed    It was a pleasure meeting with you today.  Thank you for allowing me and my team the privilege of caring for you today.  YOU are the reason we are here, and I truly hope we provided you with the excellent service you deserve.  Please let us know if there is anything else we can do for you so that we can be sure you are leaving completely satisfied with your care experience.            Follow-ups after your visit        Follow-up notes from your care team     Return in about 6 months (around 5/29/2019).      Your next 10 appointments already scheduled     Nov 29, 2018  1:00 PM CST   (Arrive by 12:45 PM)   INJECTION with  Oncology Injection Nurse   [facility] ([facility])    [address]  [address]  [facility] [address]   [phone]            Dec 06, 2018 10:45 AM CST   MA SCREENING DIGITAL BILATERAL with [id number]   [facility] ([facility])    [address] [place] [address], 2nd Floor  [facility] [address]   [phone]           How do I prepare for my exam? (Food and drink instructions) No Food and Drink Restrictions.  How do I prepare for my exam? (Other instructions) Do not use any powder, lotion or deodorant under your arms or on your breast. If you do, we will ask you to remove it before your exam.  What should I wear: Wear comfortable, two-piece clothing.  How long does the exam take: Most scans will  "take 15 minutes.  What should I bring: Bring any previous mammograms from other facilities or have them mailed to the breast center.  Do I need a :  No  is needed.  What do I need to tell my doctor: If you have any allergies, tell your care team.  What should I do after the exam: No restrictions, You may resume normal activities.  What is this test: This test is an x-ray of the breast to look for breast disease. The breast is pressed between two plates to flatten and spread the tissue. An X-ray is taken of the breast from different angles.  Who should I call with questions: If you have any questions, please call the Imaging Department where you will have your exam. Directions, parking instructions, and other information is available on our website, [email or web address].  Other information about my exam Three-dimensional (3D) mammograms are available at [place] locations in [facility], [facility], [place], [facility] and [place]. [facility] locations include [place] and the [facility] in [place].  Benefits of 3D mammograms include * Improved rate of cancer detection * Decreases your chance of having to go back for more tests, which means fewer: * \"False-positive\" results (This means that there is an abnormal area but it isn't cancer.) * Invasive testing procedures, such as a biopsy or surgery * Can provide clearer images of the breast if you have dense breast tissue.  *3D mammography is an optional exam that anyone can have with a 2D mammogram. It doesn't replace or take the place of a 2D mammogram. 2D mammograms remain an effective screening test for all women.  Not all insurance companies cover the cost of a 3D mammogram. Check with your insurance. Three-dimensional (3D) mammograms are available at [place] locations in [facility], [facility], [facility], and [place]. Health locations include [place] " "and [facility] in [place]. Benefits of 3D mammograms include: - Improved rate of cancer detection - Decreases your chance of having to go back for more tests, which means fewer: - \"False-positive\" results (This means that there is an abnormal area but it isn't cancer.) - Invasive testing procedures, such as a biopsy or surgery - Can provide clearer images of the breast if you have dense breast tissue. 3D mammography is an optional exam that anyone can have with a 2D mammogram. It doesn't replace or take the place of a 2D mammogram. 2D mammograms remain an effective screening test for all women.  Not all insurance companies cover the cost of a 3D mammogram. Check with your insurance.            Dec 24, 2018  9:30 AM CST   (Arrive by 9:15 AM)   Return Nutrition with Maisha Henderson RD   [facility] ([facility])    [address] [place] [address]   [phone]            Jan 14, 2019  1:00 PM CST   (Arrive by 12:45 PM)   RETURN FOOT/ANKLE with Javier Tuttle DPM   [facility] ([facility])    [address] [place] [id number] [phone]            Jan 22, 2019  9:30 AM CST   (Arrive by 9:15 AM)   Return Weight Management Nutrition with Leyla Guerrero RD   [facility] ([facility])    [address] [place] [address]   [phone]            Jan 28, 2019 10:00 AM CST   (Arrive by 9:45 AM)   Return Visit with Horacio Sheridan MD   [facility] ([facility])    [address] [place] [address] [phone]            Feb 26, 2019  9:30 AM CST   (Arrive by 9:15 AM)   Return Weight Management Nutrition with Leyla Guerrero RD   [facility] ([facility])    [address] [place] [address]" [address]  4[address]  [facility] [address]   [phone]            Mar 18, 2019  2:05 PM CDT   (Arrive by 1:35 PM)   Return Kidney Transplant with Uc Kidney/Pancreas Recipient 1   [facility] ([facility])    [address]  [address]  [facility] [address]   [phone]            Mar 25, 2019 10:30 AM CDT   (Arrive by 10:15 AM)   Return Weight Management Visit with Prakash Irene MD   [facility] ([facility])    [address]  4th Floor  [facility] [id number] [phone]              Who to contact     Please call your clinic at [phone] to:    Ask questions about your health    Make or cancel appointments    Discuss your medicines    Learn about your test results    Speak to your doctor            Additional Information About Your Visit        [employer] Information     [employer] gives you secure access to your electronic health record. If you see a primary care provider, you can also send messages to your care team and make appointments. If you have questions, please call your primary care clinic.  If you do not have a primary care provider, please call [phone] and they will assist you.      [employer] is an electronic gateway that provides easy, online access to your medical records. With [employer], you can request a clinic appointment, read your test results, renew a prescription or communicate with your care team.     To access your existing account, please contact your [facility] or call [phone] for assistance.        Care EveryWhere ID     This is your Care EveryWhere ID. This could be used by other organizations to access your [place] medical records  [id number]        Your Vitals Were     Pulse Height BMI (Body Mass Index)             61 1.549 m (5' 1\") 27.4 kg/m2          Blood Pressure from Last 3 Encounters:   11/29/18 132/77   11/12/18 131/76 "   11/06/18 129/81    Weight from Last 3 Encounters:   11/29/18 65.8 kg (145 lb)   11/27/18 67.4 kg (148 lb 9.6 oz)   11/12/18 66.9 kg (147 lb 6.4 oz)              Today, you had the following     No orders found for display         Today's Medication Changes          These changes are accurate as of 11/29/18 11:26 AM.  If you have any questions, ask your nurse or doctor.               These medicines have changed or have updated prescriptions.        Dose/Directions    vitamin B-12 1000 MCG tablet   Commonly known as:  CYANOCOBALAMIN   This may have changed:  when to take this   Used for:  CKD (chronic kidney disease) stage 5, GFR less than 15 ml/min (H)        Dose:  1000 mcg   Take 1 tablet by mouth daily.   Quantity:  30 tablet   Refills:  0                Primary Care Provider Office Phone # Fax #    Horacio Sheridan [phone]       [address] [place] [address]  [facility] [address]        Equal Access to Services     LINNEA HIDALGO AH: Hadii riky ku hadasho Soomaali, waaxda luqadaha, qaybta kaalmada adeegyada, waxay terencein haymicah agrawal . So [facility] [phone].    ATENCIÓN: Si habla español, tiene a goetz disposición servicios gratuitos de asistencia lingüística. Llame al [phone].    We comply with applicable federal civil rights laws and [place] laws. We do not discriminate on the basis of race, color, national origin, age, disability, sex, sexual orientation, or gender identity.            Thank you!     Thank you for choosing [facility]OLOG[facility] AND [facility] FOR PROSTATE AND UROLOGIC CANCERS  for your care. Our goal is always to provide you with excellent care. Hearing back from our patients is one way we can continue to improve our services. Please take a few minutes to complete the written survey that you may receive in the mail after your visit with us. Thank you!             Your Updated Medication List - Protect others around you: Learn how to safely use, store and throw away your  medicines at www.disposemymeds.org.          This list is accurate as of 11/29/18 11:26 AM.  Always use your most recent med list.                   Brand Name Dispense Instructions for use Diagnosis    acetylcysteine 600 MG Caps capsule    N-ACETYL CYSTEINE    30 capsule    Take 2 400 mg caps two times daily for total daily dose of 800 mg        albuterol 108 (90 Base) MCG/ACT inhaler    PROAIR HFA/PROVENTIL HFA/VENTOLIN HFA    1 Inhaler    Inhale 2 puffs into the lungs every 6 hours as needed for shortness of breath / dyspnea or wheezing    Exercise-induced asthma       ARIPiprazole 2 MG tablet    ABILIFY    30 tablet    Take 1 tablet (2 mg) by mouth daily    Major depressive disorder, recurrent episode, moderate (H)       aspirin 81 MG EC tablet    ASA    30 tablet    Take 1 tablet (81 mg) by mouth daily    Kidney replaced by transplant       blood glucose monitoring lancets     1 Box    Use to test blood sugar 2 times daily or as directed.    Type 2 diabetes mellitus with peripheral neuropathy (H)       * blood glucose monitoring meter device kit    NO BRAND SPECIFIED    1 kit    Use to test blood sugar 2 times daily or as directed.    Type 2 diabetes mellitus with peripheral neuropathy (H)       * blood glucose monitoring meter device kit           blood glucose monitoring test strip    NO BRAND SPECIFIED    100 strip    Use to test blood sugars 2 times daily or as directed    Type 2 diabetes mellitus with peripheral neuropathy (H)       * cycloSPORINE modified 25 MG capsule    GENERIC EQUIVALENT    300 capsule    Take 5 capsules (125 mg) by mouth 2 times daily    Kidney replaced by transplant       * cycloSPORINE modified 25 MG capsule    GENERIC EQUIVALENT    300 capsule    TAKE 5 CAPSULES (125MG) BY MOUTH TWO TIMES A DAY    Kidney replaced by transplant       econazole nitrate 1 % external cream     85 g    Apply topically daily    Type II or unspecified type diabetes mellitus with neurological  manifestations, not stated as uncontrolled(250.60) (H), Dermatophytosis of foot       fluticasone 50 MCG/ACT nasal spray    FLONASE    1 Bottle    Spray 1 spray into both nostrils daily    Seasonal allergic rhinitis, unspecified chronicity, unspecified trigger       furosemide 20 MG tablet    LASIX    90 tablet    Take 1 tablet (20 mg) by mouth daily    Generalized edema       latanoprost 0.005 % ophthalmic solution    XALATAN    7.5 mL    Place 1 drop into both eyes At Bedtime    Mild stage glaucoma(365.71)       metFORMIN 500 MG 24 hr tablet    GLUCOPHAGE-XR    90 tablet    Take 3 tablets (1,500 mg) by mouth daily (with dinner) Patient needs to have labs for further refills.    Type 2 diabetes mellitus with diabetic polyneuropathy, without long-term current use of insulin (H)       mycophenolate 250 MG capsule    GENERIC EQUIVALENT    240 capsule    Take 4 capsules (1,000 mg) by mouth 2 times daily    Kidney transplanted, Immunosuppression (H)       omeprazole 40 MG DR capsule    priLOSEC    90 capsule    Take 1 capsule (40 mg) by mouth daily    Gastroesophageal reflux disease without esophagitis       ondansetron 4 MG ODT tab    ZOFRAN-ODT    20 tablet    Take 1 tablet (4 mg) by mouth every 6 hours as needed    Chronic kidney disease, stage V (H)       order for DME     1 Units    Walker with front wheels and a seat.    Fall, initial encounter       prazosin 5 MG capsule    MINIPRESS    90 capsule    Take 3 capsules (15 mg) by mouth At Bedtime    Nightmares associated with chronic post-traumatic stress disorder       REFRESH OP      Apply to eye as needed Both eyes        replens Gel     35 g    Use vaginally as needed. Can use up to 3 times per week.    Vaginal dryness       simvastatin 20 MG tablet    ZOCOR    90 tablet    Take 1 tablet (20 mg) by mouth At Bedtime    Chronic kidney disease, stage V (H)       sulfamethoxazole-trimethoprim 400-80 MG tablet    BACTRIM/SEPTRA    90 tablet    Take 1 tablet by mouth  daily    Immunosuppression (H), Kidney transplanted       topiramate 200 MG tablet    TOPAMAX    60 tablet    Take 1 tablet (200 mg) by mouth 2 times daily    Morbid obesity due to excess calories (H)       TYLENOL 325 MG tablet   Generic drug:  acetaminophen      Take 325-650 mg by mouth as needed        vilazodone 40 MG Tabs tablet    VIIBRYD    30 tablet    Take 1 tablet (40 mg) by mouth daily    Major depressive disorder, recurrent episode, moderate (H)       vitamin B-12 1000 MCG tablet    CYANOCOBALAMIN    30 tablet    Take 1 tablet by mouth daily.    CKD (chronic kidney disease) stage 5, GFR less than 15 ml/min (H)       vitamin D 1000 units capsule     30 capsule    2,000 Units        * Notice:  This list has 4 medication(s) that are the same as other medications prescribed for you. Read the directions carefully, and ask your doctor or other care provider to review them with you.

## 2018-11-29 NOTE — PROGRESS NOTES
Chief Complaint   Patient presents with     Imm/Inj     patient with Senile osteoporosis - here for a Prolia injection and partial vitals     Patient arrived to clinic for a Prolia injection today.  Patient declined to speak with an RN today.   Results reviewed, labs MET treatment parameters, ok to proceed with treatment.  Prolia injection given to abdominal tissue without incident.  Copy of AVS reviewed with patient and/or family.  Patient will return in 6 months for next injection appointment.    Education materials printed from [email or web address] for patient. All questions answered and card or next injection due date given to patient.

## 2018-11-29 NOTE — MR AVS SNAPSHOT
After Visit Summary   2018    Elizabeth Palma    MRN: [id number]           Patient Information     Date Of Birth          1955        Visit Information        Provider Department      2018 1:00 PM Nurse, [facility]        Today's Diagnoses     Personal history of other drug therapy    -  1    Age-related osteoporosis without current pathological fracture        Senile osteoporosis        -donor kidney transplant           Follow-ups after your visit        Your next 10 appointments already scheduled     Dec 06, 2018 10:45 AM CST   MA SCREENING DIGITAL BILATERAL with [id number]   [facility] ([facility])    [address] [place] [address], 2nd Floor  [facility] [address] [phone]           How do I prepare for my exam? (Food and drink instructions) No Food and Drink Restrictions.  How do I prepare for my exam? (Other instructions) Do not use any powder, lotion or deodorant under your arms or on your breast. If you do, we will ask you to remove it before your exam.  What should I wear: Wear comfortable, two-piece clothing.  How long does the exam take: Most scans will take 15 minutes.  What should I bring: Bring any previous mammograms from other facilities or have them mailed to the breast center.  Do I need a :  No  is needed.  What do I need to tell my doctor: If you have any allergies, tell your care team.  What should I do after the exam: No restrictions, You may resume normal activities.  What is this test: This test is an x-ray of the breast to look for breast disease. The breast is pressed between two plates to flatten and spread the tissue. An X-ray is taken of the breast from different angles.  Who should I call with questions: If you have any questions, please call the Imaging Department where you will have your exam. Directions, parking instructions, and other information  "is available on our website, [email or web address].  Other information about my exam Three-dimensional (3D) mammograms are available at [facility] locations in [facility], [facility], [place], [facility] [place] and [place]. [facility] locations include [place] and [facility] in [place].  Benefits of 3D mammograms include * Improved rate of cancer detection * Decreases your chance of having to go back for more tests, which means fewer: * \"False-positive\" results (This means that there is an abnormal area but it isn't cancer.) * Invasive testing procedures, such as a biopsy or surgery * Can provide clearer images of the breast if you have dense breast tissue.  *3D mammography is an optional exam that anyone can have with a 2D mammogram. It doesn't replace or take the place of a 2D mammogram. 2D mammograms remain an effective screening test for all women.  Not all insurance companies cover the cost of a 3D mammogram. Check with your insurance. Three-dimensional (3D) mammograms are available at [facility] locations in [facility], [facility], [facility], and [place]. Health locations include [place] and [facility] in [place]. Benefits of 3D mammograms include: - Improved rate of cancer detection - Decreases your chance of having to go back for more tests, which means fewer: - \"False-positive\" results (This means that there is an abnormal area but it isn't cancer.) - Invasive testing procedures, such as a biopsy or surgery - Can provide clearer images of the breast if you have dense breast tissue. 3D mammography is an optional exam that anyone can have with a 2D mammogram. It doesn't replace or take the place of a 2D mammogram. 2D mammograms remain an effective screening test for all women.  Not all insurance companies cover the cost of a 3D mammogram. Check with your insurance.            Dec 24, 2018  9:30 AM " CST   (Arrive by 9:15 AM)   Return Nutrition with Maisha Henderson RD   [facility] Gastroenterology and IBD [facility] ([facility])    [address] [place] [address]   [phone]            Jan 14, 2019  1:00 PM CST   (Arrive by 12:45 PM)   RETURN FOOT/ANKLE with Javier Tuttle DPM   [facility] Orthopaedic [facility] ([facility])    [address] [place] [address]   [phone]            Jan 22, 2019  9:30 AM CST   (Arrive by 9:15 AM)   Return Weight Management Nutrition with Leyla Guerrero RD   [facility] Surgical Weight Management ([facility])    [address] [place] [address]   [phone]            Jan 28, 2019 10:00 AM CST   (Arrive by 9:45 AM)   Return Visit with Horacio Sheridan MD   [facility] Primary Care Clinic ([facility])    [address] [place] [address]   [phone]            Feb 26, 2019  9:30 AM CST   (Arrive by 9:15 AM)   Return Weight Management Nutrition with Leyla Guerrero RD   [facility] Surgical Weight Management ([facility])    [address] [place] [address]   [phone]            Mar 18, 2019  2:05 PM CDT   (Arrive by 1:35 PM)   Return Kidney Transplant with Uc Kidney/Pancreas Recipient 1   [facility] Nephrology ([facility])    [address] [place] [address]  [address]  [facility] [address]   [phone]            Mar 25, 2019 10:30 AM CDT   (Arrive by 10:15 AM)   Return Weight Management Visit with Prakash Irene MD   [facility] Medical Weight Management ([facility])    [address] [place] [address]   [phone]            Jun 20, 2019  2:45 PM CDT   (Arrive by 2:30 PM)   Return Visit with Caren Calderon MD   [facility] Urology and Inst for Prostate  and Urologic Cancers ([facility])    [address] [facility]  4th Floor  [facility] [address] [phone]              Who to contact     If you have questions or need follow up information about today's clinic visit or your schedule please contact [facility] directly at [phone].  Normal or non-critical lab and imaging results will be communicated to you by MyChart, letter or phone within 4 business days after the clinic has received the results. If you do not hear from us within 7 days, please contact the clinic through [employer] or phone. If you have a critical or abnormal lab result, we will notify you by phone as soon as possible.  Submit refill requests through [employer] or call your pharmacy and they will forward the refill request to us. Please allow 3 business days for your refill to be completed.          Additional Information About Your Visit        [facility] [employer] Information     [employer] gives you secure access to your electronic health record. If you see a primary care provider, you can also send messages to your care team and make appointments. If you have questions, please call your primary care clinic.  If you do not have a primary care provider, please call [phone] and they will assist you.        Care EveryWhere ID     This is your Care EveryWhere ID. This could be used by other organizations to access your [facility] medical records  [id number]        Your Vitals Were     Pulse Temperature Respirations Pulse Oximetry          76 98.2  F (36.8  C) (Oral) 18 98%         Blood Pressure from Last 3 Encounters:   11/29/18 132/77   11/12/18 131/76   11/06/18 129/81    Weight from Last 3 Encounters:   11/29/18 65.8 kg (145 lb)   11/27/18 67.4 kg (148 lb 9.6 oz)   11/12/18 66.9 kg (147 lb 6.4 oz)              Today, you had the following     No orders found for display         Today's Medication Changes          These changes are accurate as of 11/29/18   3:56 PM.  If you have any questions, ask your nurse or doctor.               These medicines have changed or have updated prescriptions.        Dose/Directions    vitamin B-12 1000 MCG tablet   Commonly known as:  CYANOCOBALAMIN   This may have changed:  when to take this   Used for:  CKD (chronic kidney disease) stage 5, GFR less than 15 ml/min (H)        Dose:  1000 mcg   Take 1 tablet by mouth daily.   Quantity:  30 tablet   Refills:  0                Primary Care Provider Office Phone # Fax #    Horacio Sehridan [phone]       [address] [place] [address]        Equal Access to Services     [facility] : Hadii riky lehman hadethan Somartín, waaxjuly aleman, qaparker oconnoralmajuly horowitz, ty agrawal . So [facility] [phone].    ATENCIÓN: Si habla español, tiene a goetz disposición servicios gratuitos de asistencia lingüística. [facility] [phone].    We comply with applicable federal civil rights laws and [place] laws. We do not discriminate on the basis of race, color, national origin, age, disability, sex, sexual orientation, or gender identity.            Thank you!     Thank you for choosing [facility]  for your care. Our goal is always to provide you with excellent care. Hearing back from our patients is one way we can continue to improve our services. Please take a few minutes to complete the written survey that you may receive in the mail after your visit with us. Thank you!             Your Updated Medication List - Protect others around you: Learn how to safely use, store and throw away your medicines at www.disposemymeds.org.          This list is accurate as of 11/29/18  3:56 PM.  Always use your most recent med list.                   Brand Name Dispense Instructions for use Diagnosis    acetylcysteine 600 MG Caps capsule    N-ACETYL CYSTEINE    30 capsule    Take 2 400 mg caps two times daily for total daily dose of 800 mg         albuterol 108 (90 Base) MCG/ACT inhaler    PROAIR HFA/PROVENTIL HFA/VENTOLIN HFA    1 Inhaler    Inhale 2 puffs into the lungs every 6 hours as needed for shortness of breath / dyspnea or wheezing    Exercise-induced asthma       ARIPiprazole 2 MG tablet    ABILIFY    30 tablet    Take 1 tablet (2 mg) by mouth daily    Major depressive disorder, recurrent episode, moderate (H)       aspirin 81 MG EC tablet    ASA    30 tablet    Take 1 tablet (81 mg) by mouth daily    Kidney replaced by transplant       blood glucose monitoring lancets     1 Box    Use to test blood sugar 2 times daily or as directed.    Type 2 diabetes mellitus with peripheral neuropathy (H)       * blood glucose monitoring meter device kit    NO BRAND SPECIFIED    1 kit    Use to test blood sugar 2 times daily or as directed.    Type 2 diabetes mellitus with peripheral neuropathy (H)       * blood glucose monitoring meter device kit           blood glucose monitoring test strip    NO BRAND SPECIFIED    100 strip    Use to test blood sugars 2 times daily or as directed    Type 2 diabetes mellitus with peripheral neuropathy (H)       * cycloSPORINE modified 25 MG capsule    GENERIC EQUIVALENT    300 capsule    Take 5 capsules (125 mg) by mouth 2 times daily    Kidney replaced by transplant       * cycloSPORINE modified 25 MG capsule    GENERIC EQUIVALENT    300 capsule    TAKE 5 CAPSULES (125MG) BY MOUTH TWO TIMES A DAY    Kidney replaced by transplant       econazole nitrate 1 % external cream     85 g    Apply topically daily    Type II or unspecified type diabetes mellitus with neurological manifestations, not stated as uncontrolled(250.60) (H), Dermatophytosis of foot       fluticasone 50 MCG/ACT nasal spray    FLONASE    1 Bottle    Spray 1 spray into both nostrils daily    Seasonal allergic rhinitis, unspecified chronicity, unspecified trigger       furosemide 20 MG tablet    LASIX    90 tablet    Take 1 tablet (20 mg) by mouth daily     Generalized edema       latanoprost 0.005 % ophthalmic solution    XALATAN    7.5 mL    Place 1 drop into both eyes At Bedtime    Mild stage glaucoma(365.71)       metFORMIN 500 MG 24 hr tablet    GLUCOPHAGE-XR    90 tablet    Take 3 tablets (1,500 mg) by mouth daily (with dinner) Patient needs to have labs for further refills.    Type 2 diabetes mellitus with diabetic polyneuropathy, without long-term current use of insulin (H)       mycophenolate 250 MG capsule    GENERIC EQUIVALENT    240 capsule    Take 4 capsules (1,000 mg) by mouth 2 times daily    Kidney transplanted, Immunosuppression (H)       omeprazole 40 MG DR capsule    priLOSEC    90 capsule    Take 1 capsule (40 mg) by mouth daily    Gastroesophageal reflux disease without esophagitis       ondansetron 4 MG ODT tab    ZOFRAN-ODT    20 tablet    Take 1 tablet (4 mg) by mouth every 6 hours as needed    Chronic kidney disease, stage V (H)       order for DME     1 Units    Walker with front wheels and a seat.    Fall, initial encounter       prazosin 5 MG capsule    MINIPRESS    90 capsule    Take 3 capsules (15 mg) by mouth At Bedtime    Nightmares associated with chronic post-traumatic stress disorder       REFRESH OP      Apply to eye as needed Both eyes        replens Gel     35 g    Use vaginally as needed. Can use up to 3 times per week.    Vaginal dryness       simvastatin 20 MG tablet    ZOCOR    90 tablet    Take 1 tablet (20 mg) by mouth At Bedtime    Chronic kidney disease, stage V (H)       sulfamethoxazole-trimethoprim 400-80 MG tablet    BACTRIM/SEPTRA    90 tablet    Take 1 tablet by mouth daily    Immunosuppression (H), Kidney transplanted       topiramate 200 MG tablet    TOPAMAX    60 tablet    Take 1 tablet (200 mg) by mouth 2 times daily    Morbid obesity due to excess calories (H)       TYLENOL 325 MG tablet   Generic drug:  acetaminophen      Take 325-650 mg by mouth as needed        vilazodone 40 MG Tabs tablet    VIIBRYD    30  tablet    Take 1 tablet (40 mg) by mouth daily    Major depressive disorder, recurrent episode, moderate (H)       vitamin B-12 1000 MCG tablet    CYANOCOBALAMIN    30 tablet    Take 1 tablet by mouth daily.    CKD (chronic kidney disease) stage 5, GFR less than 15 ml/min (H)       vitamin D 1000 units capsule     30 capsule    2,000 Units        * Notice:  This list has 4 medication(s) that are the same as other medications prescribed for you. Read the directions carefully, and ask your doctor or other care provider to review them with you.

## 2018-11-29 NOTE — PATIENT INSTRUCTIONS
Let us know if the symptoms return or if you think you have another urinary infection [phone] or [phone]    Return to see us in about 6 months, sooner if needed    It was a pleasure meeting with you today.  Thank you for allowing me and my team the privilege of caring for you today.  YOU are the reason we are here, and I truly hope we provided you with the excellent service you deserve.  Please let us know if there is anything else we can do for you so that we can be sure you are leaving completely satisfied with your care experience.

## 2018-11-29 NOTE — PROGRESS NOTES
"November 29, 2018    Return visit    Patient returns today for follow up.  She states that her symptoms have completely resolved with the UTI.  She denies any changes in her health since last visit.    /77  Pulse 61  Ht 1.549 m (5' 1\")  Wt 65.8 kg (145 lb)  BMI 27.4 kg/m2  She is comfortable, in no distress, non-labored breathing.      CT scan images were reviewed with patient.  She has native kidneys consistent with PCK and bilateral small kidney stones.  She has no stones or abnormalities noted in her transplant kidney      A/P: 63 year old F with history of bilateral PCKD s/p renal transplant, history of gastric bypass, history of nephrolithiasis, acute onset urinary urgency resolved after treatment of UTI    At this time no further intervention.  Discussed she does have native kidney stones and if she continues to have infections may need to consider addressing her native kidneys    At this time she will monitor and let us know if she is having any UTIs.      RTC 6 months, sooner if needed.  If no further UTIs during this time period will likely her her PCP monitor for any further urinary issues    15 minutes were spent with the patient today, > 50% in counseling and coordination of care    Caren Calderon MD MPH   of Urology    CC  Patient Care Team:  Horacio Sheridan MD as PCP - General (Internal Medicine)  Javier Tuttle DPM (Podiatry)  Sky Davey MD as MD (Surgery)  Fina Garcia MD as MD (INTERNAL MEDICINE - ENDOCRINOLOGY, DIABETES & METABOLISM)  AUSTIN Hudson MD as MD (Dermatology)  Chaparrita Hatch MD as MD (Psychiatry)  Prakash Irene MD as MD (INTERNAL MEDICINE - ENDOCRINOLOGY, DIABETES & METABOLISM)  Dex Garza MD as MD (Transplant)  Yonas Underwood MD as MD (Ophthalmology)  Gerber Dickerson MD as MD (Neurology)  Stiven Painting MD as MD (Neurology)  Yobani Hammonds MD as MD (Orthopedics)  Modesta, " Lizbet Glez MD as MD (INTERNAL MEDICINE - ENDOCRINOLOGY, DIABETES & METABOLISM)  Christian Jimenez MD as Hospitalist (Internal Medicine)  Era Gonzalez, PhD LP ([facility])  Param Salas APRN CNM as Midwife (Midwives)  Patito Rush MD as MD (Dermatology)  Caren Calderon MD as MD (Urology)  RADHA SANDS

## 2018-11-29 NOTE — LETTER
"11/29/2018       RE: Elizabeth Palma  [address]  [place] [id number]     Dear Colleague,    Thank you for referring your patient, Elizabeth Palma, to the [facility] UROLOGY AND INST FOR PROSTATE AND UROLOGIC CANCERS at [facility]. Please see a copy of my visit note below.    November 29, 2018    Return visit    Patient returns today for follow up.  She states that her symptoms have completely resolved with the UTI.  She denies any changes in her health since last visit.    /77  Pulse 61  Ht 1.549 m (5' 1\")  Wt 65.8 kg (145 lb)  BMI 27.4 kg/m2  She is comfortable, in no distress, non-labored breathing.      CT scan images were reviewed with patient.  She has native kidneys consistent with PCK and bilateral small kidney stones.  She has no stones or abnormalities noted in her transplant kidney      A/P: 63 year old F with history of bilateral PCKD s/p renal transplant, history of gastric bypass, history of nephrolithiasis, acute onset urinary urgency resolved after treatment of UTI    At this time no further intervention.  Discussed she does have native kidney stones and if she continues to have infections may need to consider addressing her native kidneys    At this time she will monitor and let us know if she is having any UTIs.      RTC 6 months, sooner if needed.  If no further UTIs during this time period will likely her her PCP monitor for any further urinary issues    15 minutes were spent with the patient today, > 50% in counseling and coordination of care    Caren Calderon MD MPH   of Urology    CC  Patient Care Team:  Horacio Sheridan MD as PCP - General (Internal Medicine)  Javier Tuttle DPM (Podiatry)  Sky Davey MD as MD (Surgery)  Fina Garcia MD as MD (INTERNAL MEDICINE - ENDOCRINOLOGY, DIABETES & METABOLISM)  AUSTIN Hudson MD as MD (Dermatology)  Chaparrita Hatch MD as MD " (Psychiatry)  Prakash Irene MD as MD (INTERNAL MEDICINE - ENDOCRINOLOGY, DIABETES & METABOLISM)  Dex Garza MD as MD (Transplant)  Yonas Underwood MD as MD (Ophthalmology)  Gerber Dickerson MD as MD (Neurology)  Stiven Painting MD as MD (Neurology)  Yobani Hammonds MD as MD (Orthopedics)  Lizbet Byers MD as MD (INTERNAL MEDICINE - ENDOCRINOLOGY, DIABETES & METABOLISM)  Christian Jimenez MD as Hospitalist (Internal Medicine)  Era Gonzalez, PhD LP ([facility])  Param Salas APRN CNM as Midwife (Midwives)  Patito Rush MD as MD (Dermatology)  Caren Calderon MD as MD (Urology)  RADHA SANDS

## 2018-12-05 DIAGNOSIS — E11.42 TYPE 2 DIABETES MELLITUS WITH DIABETIC POLYNEUROPATHY, WITHOUT LONG-TERM CURRENT USE OF INSULIN (H): ICD-10-CM

## 2018-12-06 ENCOUNTER — RADIANT APPOINTMENT (OUTPATIENT)
Dept: MAMMOGRAPHY | Facility: CLINIC | Age: 63
End: 2018-12-06
Attending: INTERNAL MEDICINE
Payer: MEDICARE

## 2018-12-06 DIAGNOSIS — Z12.31 SCREENING MAMMOGRAM, ENCOUNTER FOR: ICD-10-CM

## 2018-12-07 RX ORDER — METFORMIN HCL 500 MG
1500 TABLET, EXTENDED RELEASE 24 HR ORAL
Qty: 270 TABLET | Refills: 1 | Status: SHIPPED | OUTPATIENT
Start: 2018-12-07 | End: 2019-06-06

## 2018-12-10 ENCOUNTER — TRANSFERRED RECORDS (OUTPATIENT)
Dept: HEALTH INFORMATION MANAGEMENT | Facility: CLINIC | Age: 63
End: 2018-12-10

## 2018-12-18 ENCOUNTER — TRANSFERRED RECORDS (OUTPATIENT)
Dept: HEALTH INFORMATION MANAGEMENT | Facility: CLINIC | Age: 63
End: 2018-12-18

## 2018-12-24 ENCOUNTER — OFFICE VISIT (OUTPATIENT)
Dept: GASTROENTEROLOGY | Facility: CLINIC | Age: 63
End: 2018-12-24
Payer: MEDICARE

## 2018-12-24 VITALS — WEIGHT: 147.5 LBS | HEIGHT: 61 IN | BODY MASS INDEX: 27.85 KG/M2

## 2018-12-24 DIAGNOSIS — Z48.298 AFTERCARE FOLLOWING ORGAN TRANSPLANT: ICD-10-CM

## 2018-12-24 DIAGNOSIS — Z71.3 NUTRITIONAL COUNSELING: Primary | ICD-10-CM

## 2018-12-24 LAB
ANION GAP SERPL CALCULATED.3IONS-SCNC: 8 MMOL/L (ref 3–14)
BUN SERPL-MCNC: 16 MG/DL (ref 7–30)
CALCIUM SERPL-MCNC: 9.1 MG/DL (ref 8.5–10.1)
CHLORIDE SERPL-SCNC: 110 MMOL/L (ref 94–109)
CO2 SERPL-SCNC: 24 MMOL/L (ref 20–32)
CREAT SERPL-MCNC: 1.02 MG/DL (ref 0.52–1.04)
CYCLOSPORINE BLD LC/MS/MS-MCNC: 92 UG/L (ref 50–400)
ERYTHROCYTE [DISTWIDTH] IN BLOOD BY AUTOMATED COUNT: 16.3 % (ref 10–15)
GFR SERPL CREATININE-BSD FRML MDRD: 59 ML/MIN/{1.73_M2}
GLUCOSE SERPL-MCNC: 154 MG/DL (ref 70–99)
HCT VFR BLD AUTO: 38.4 % (ref 35–47)
HGB BLD-MCNC: 11.5 G/DL (ref 11.7–15.7)
MCH RBC QN AUTO: 24.5 PG (ref 26.5–33)
MCHC RBC AUTO-ENTMCNC: 29.9 G/DL (ref 31.5–36.5)
MCV RBC AUTO: 82 FL (ref 78–100)
PLATELET # BLD AUTO: 160 10E9/L (ref 150–450)
POTASSIUM SERPL-SCNC: 3.7 MMOL/L (ref 3.4–5.3)
RBC # BLD AUTO: 4.69 10E12/L (ref 3.8–5.2)
SODIUM SERPL-SCNC: 141 MMOL/L (ref 133–144)
TME LAST DOSE: NORMAL H
WBC # BLD AUTO: 3.8 10E9/L (ref 4–11)

## 2018-12-24 ASSESSMENT — MIFFLIN-ST. JEOR: SCORE: 1171.44

## 2018-12-24 NOTE — LETTER
"12/24/2018       RE: Elizabeth Palma  [address]  [facility] [address]     Dear Colleague,    Thank you for referring your patient, Elizabeth Palma, to the [facility] GASTRO[facility]OLOGY AND IBD CLINIC at [facility]. Please see a copy of my visit note below.    Nutrition Reassessment  Reason For Visit:  Elizabeth Palma is a 63 year-old female with type 2 DM (oral med management) presenting today for nutrition follow-up, s/p \"gastric bypass in 1990 at [facility]\" per Pt report.  She is seeing medical weight management.  She was referred by Dr. Irene.  Note: Pt had a kidney transplant on March 20, 2014.    Pt was accompanied by her .     Anthropometrics:  Height: 61\"  Pre-op Weight: 265 lbs per Pt report; lowest weight after bariatric surgery: 165-170 lbs (25 years ago)  (Pt reported that she initially was at 215 lbs in 2015)    Current Weight: 147 lbs 8 oz (-1 lb over the past month) with BMI of 27.9 kg/m^2.    Current Vitamins/Minerals: 3000 International Units vitamin D/day, Calcium 2x daily, vitamin C, vitamin B12 daily, B-complex  *Pt stated that she was told not to take other vitamins/minerals by MD.     Nutrition History:  Lactose intolerance ever since bariatric surgery.  Pt stated that she has osteoporosis; however, she stated that her doctors don't want her to take any vitamins or minerals due to her kidney transplant.    Diet/Nutrition Intake Hx:   On previous RD visits, pt reported her intake is affected by nausea 2/2 her anti-rejection medications.  Pt is also limited in protein choices that she likes - pt reports she does not take much dairy, does not like beans and lentils, keeps kosher. Pt also stated on previous visits that she will not record food intake due to the fact that every time she does this, she simply does not eat as she doesn't want to record.  She stated that her therapist strongly advises against recording food intake for this " reason.     Physical Activity Note: She reported doing some walking in hallways and in hallways at work, but no structured exercise. At a previous RD visit reported that she has a tendency to break bones so she is limited with what exercises she does.     Diet recall: She stated that she tends to eats lunch and dinner meals but skips breakfast due to does not feel well after taking her medications in the morning. Sometimes she will have breakfast though if up early and/or sometimes skips lunch meal or only eats a slice of cheese for example. She has tried the protein chips available through this clinic and liked them so will purchase more today at end of visit. She stated that she does not eat a lot of protein foods, so her protein intake is limited. She has tried many protein drinks in the past but did not like them. Tried protein bars from clinic and stated they are okay but she can only eat a couple of bites. See some usual recent intake/meals below:  Breakfast: skips  Lunch: 1/2 cheese/tuna or egg sandwich or a little less OR skips OR slice of cheese OR Franc[employer] baked potato with margarine OR yesterday had a egg and herring and cauliflower, celery and dip and 1T pasta salad and 2 small cubes cheese and couple olives  Dinner: homemade vegetable soup sometimes with sunflower seeds OR 1/2 sloppy sahara OR 1-2 chicken wings OR 1 potato pancake  Snacks: sometimes crackers or handful sunflower seeds OR protein chips  Beverages: 8-10 cups water/day, 1 cup coffee with almond milk and sf sub and ~3 cups bouillon, 3 cups tea per week  Alcohol: none    Progress with Previous Goals:    1. Avoid grazing through, Eat 3 meals with lean protein at each meal, along with a non-starchy vegetable or whole fruit, up to 1/2 c carb at a meal.-not meeting 3 meals per day but typically 2 meals per day.   -Try hard-boiled eggs to put on your salad or to have later in the day to make up for skipping breakfast.    2. If needing a snack, have  vegetables or protein snack (give at least 2 hours between a meals and a snack).-mostly meeting.   3. Walk 10 min daily, increasing as able.-not meeting  4. Meal planning for dinner -meeting/has been planning and thinking about it more.    Nutrition Prescription:   Grams Protein: 60 (minimum) - Not meeting consistently.   Amount of Fluid: 48-64 oz    Nutrition Diagnosis  Previous: Overweight related to history of excessive energy intake as evidenced by BMI > 25. - amended    Current: Overweight related to history of excessive energy intake, variable eating habits/intake and lack of sufficient exercise as evidenced by pt report and BMI > 25.    Intervention  Intervention At Appointment:  Materials/Education provided: Reviewed her progress towards previous goals. Gave tips and suggestions for following guidelines. Discussed importance of eating regular meals and encouraged her to eat protein at all meals and to increase exercise as able such as 10 minutes at least each day. Encouraged pt to continue to work on meal planning for dinner meals but now also for breakfast and lunch meals as well. Discussed some options for replacing a meals with a protein drink especially in the morning. Discussed possibly trying a clear protein drink/protein water. Discussed some examples including those available in clinic. Also told pt she could add some protein powder into her cup coffee each day for a meal replacement as well. Gave encouragement and support.    *Note: Purchased the protein chips again from clinic today.      Patient Understanding: good  Expected Compliance: good with continued RD follow up.     Goals:   1. Eat 3 meals with lean protein at each meal, along with a non-starchy vegetable or whole fruit, up to 1/2 c carb at a meal.   -Can try a protein drink for breakfast meal Such as [facility] drinks or Integrated supplement (no sugar substitute and has a small sized container to try), pure protein, muscle. or protein water  ([facility] clear protein drink or Bipro or Premier clear). Can add protein powder into coffee for example.  2. If needing a snack, have vegetables or protein snack (give at least 2 hours between a meals and a snack).  3. Walk 10 min daily, increasing as able.  4. Continue working on Meal planning for all meals.    Follow-Up: 1 month or PRN     Time spent with patient: 30 minutes.      Maisha Henderson MS, RD, LD

## 2018-12-24 NOTE — PROGRESS NOTES
"Nutrition Reassessment  Reason For Visit:  Elizabeth Palma is a 63 year-old female with type 2 DM (oral med management) presenting today for nutrition follow-up, s/p \"gastric bypass in 1990 at [facility]\" per Pt report.  She is seeing medical weight management.  She was referred by Dr. Irene.  Note: Pt had a kidney transplant on March 20, 2014.    Pt was accompanied by her .     Anthropometrics:  Height: 61\"  Pre-op Weight: 265 lbs per Pt report; lowest weight after bariatric surgery: 165-170 lbs (25 years ago)  (Pt reported that she initially was at 215 lbs in 2015)    Current Weight: 147 lbs 8 oz (-1 lb over the past month) with BMI of 27.9 kg/m^2.    Current Vitamins/Minerals: 3000 International Units vitamin D/day, Calcium 2x daily, vitamin C, vitamin B12 daily, B-complex  *Pt stated that she was told not to take other vitamins/minerals by MD.     Nutrition History:  Lactose intolerance ever since bariatric surgery.  Pt stated that she has osteoporosis; however, she stated that her doctors don't want her to take any vitamins or minerals due to her kidney transplant.    Diet/Nutrition Intake Hx:   On previous RD visits, pt reported her intake is affected by nausea 2/2 her anti-rejection medications.  Pt is also limited in protein choices that she likes - pt reports she does not take much dairy, does not like beans and lentils, keeps kosher. Pt also stated on previous visits that she will not record food intake due to the fact that every time she does this, she simply does not eat as she doesn't want to record.  She stated that her therapist strongly advises against recording food intake for this reason.     Physical Activity Note: She reported doing some walking in hallways and in hallways at work, but no structured exercise. At a previous RD visit reported that she has a tendency to break bones so she is limited with what exercises she does.     Diet recall: She stated that she tends to eats lunch and " dinner meals but skips breakfast due to does not feel well after taking her medications in the morning. Sometimes she will have breakfast though if up early and/or sometimes skips lunch meal or only eats a slice of cheese for example. She has tried the protein chips available through this clinic and liked them so will purchase more today at end of visit. She stated that she does not eat a lot of protein foods, so her protein intake is limited. She has tried many protein drinks in the past but did not like them. Tried protein bars from clinic and stated they are okay but she can only eat a couple of bites. See some usual recent intake/meals below:  Breakfast: skips  Lunch: 1/2 cheese/tuna or egg sandwich or a little less OR skips OR slice of cheese OR [employer] baked potato with margarine OR yesterday had a egg and herring and cauliflower, celery and dip and 1T pasta salad and 2 small cubes cheese and couple olives  Dinner: homemade vegetable soup sometimes with sunflower seeds OR 1/2 sloppy sahara OR 1-2 chicken wings OR 1 potato pancake  Snacks: sometimes crackers or handful sunflower seeds OR protein chips  Beverages: 8-10 cups water/day, 1 cup coffee with almond milk and sf sub and ~3 cups bouillon, 3 cups tea per week  Alcohol: none    Progress with Previous Goals:    1. Avoid grazing through, Eat 3 meals with lean protein at each meal, along with a non-starchy vegetable or whole fruit, up to 1/2 c carb at a meal.-not meeting 3 meals per day but typically 2 meals per day.   -Try hard-boiled eggs to put on your salad or to have later in the day to make up for skipping breakfast.    2. If needing a snack, have vegetables or protein snack (give at least 2 hours between a meals and a snack).-mostly meeting.   3. Walk 10 min daily, increasing as able.-not meeting  4. Meal planning for dinner -meeting/has been planning and thinking about it more.    Nutrition Prescription:   Grams Protein: 60 (minimum) - Not meeting  consistently.   Amount of Fluid: 48-64 oz    Nutrition Diagnosis  Previous: Overweight related to history of excessive energy intake as evidenced by BMI > 25. - amended    Current: Overweight related to history of excessive energy intake, variable eating habits/intake and lack of sufficient exercise as evidenced by pt report and BMI > 25.    Intervention  Intervention At Appointment:  Materials/Education provided: Reviewed her progress towards previous goals. Gave tips and suggestions for following guidelines. Discussed importance of eating regular meals and encouraged her to eat protein at all meals and to increase exercise as able such as 10 minutes at least each day. Encouraged pt to continue to work on meal planning for dinner meals but now also for breakfast and lunch meals as well. Discussed some options for replacing a meals with a protein drink especially in the morning. Discussed possibly trying a clear protein drink/protein water. Discussed some examples including those available in clinic. Also told pt she could add some protein powder into her cup coffee each day for a meal replacement as well. Gave encouragement and support.    *Note: Purchased the protein chips again from clinic today.      Patient Understanding: good  Expected Compliance: good with continued RD follow up.     Goals:   1. Eat 3 meals with lean protein at each meal, along with a non-starchy vegetable or whole fruit, up to 1/2 c carb at a meal.   -Can try a protein drink for breakfast meal Such as [facility] drinks or Integrated supplement (no sugar substitute and has a small sized container to try), pure protein, muscle. or protein water ([facility] clear protein drink or Bipro or Premier clear). Can add protein powder into coffee for example.  2. If needing a snack, have vegetables or protein snack (give at least 2 hours between a meals and a snack).  3. Walk 10 min daily, increasing as able.  4. Continue working on Meal planning for all  meals.    Follow-Up: 1 month or PRN     Time spent with patient: 30 minutes.  Maisha Henderson MS, RD, LD

## 2018-12-24 NOTE — PATIENT INSTRUCTIONS
Goals:   1. Eat 3 meals with lean protein at each meal, along with a non-starchy vegetable or whole fruit, up to 1/2 c carb at a meal.   -Can try a protein drink for breakfast meal Such as [facility] drinks or Integrated supplement (no sugar substitute and has a small sized container to try), pure protein, muscle. or protein water ([facility] clear protein drink or Bipro or Premier clear). Can add protein powder into coffee for example.  2. If needing a snack, have vegetables or protein snack (give at least 2 hours between a meals and a snack).  3. Walk 10 min daily, increasing as able.  4. Continue working on Meal planning for all meals.    Maisha Henderson, MS, RD, LD    If you would like to schedule a follow up appointment with Maisha Henderson, Registered Dietitian, please call [phone].

## 2018-12-30 ENCOUNTER — DOCUMENTATION ONLY (OUTPATIENT)
Dept: TRANSPLANT | Facility: CLINIC | Age: 63
End: 2018-12-30

## 2018-12-30 DIAGNOSIS — Z94.0 KIDNEY TRANSPLANTED: Primary | ICD-10-CM

## 2018-12-30 DIAGNOSIS — Z48.298 AFTERCARE FOLLOWING ORGAN TRANSPLANT: ICD-10-CM

## 2018-12-30 NOTE — LETTER
PHYSICIAN ORDERS    DATE & TIME ISSUED: 2018 10:38 PM  PATIENT NAME: Elizabeth Palma   : 1955     [facility] MR# [if applicable]: [id number]     DIAGNOSIS / ICD - 10 CODES    Kidney Transplanted (Z94.0)    After Care Following Organ Transplant (Z48.298)    Long Term Use of Medication (Z79.899)    Complications Kidney Transplant (T86.10)      Monthly    Hemogram and Platelet    Basic Metabolic Panel (Sodium,potassium,chloride,CO2,creatinine,urea,nitrogen,glucose,calcium)    Cyclosporine drug level    Every 6 months    BK (polyoma virus) PCR Quantitative - Plasma    Urine for protein creatinine ratio    Yearly    PRA / DSA level (mailers provided by patient)      Patient should release information to the [facility].   Please fax results to the Transplant Center at [phone].  Any questions please call [phone] or [phone].        Christian Jimenez MD

## 2018-12-31 NOTE — PROGRESS NOTES
Chart Prep    Clinic Visit on:  3/18/19    Last lab completed: 12/24/18    Lab letter updated: 12/30/18    Lab: [facility]    Lab orders up to date in Epic.

## 2019-01-09 ENCOUNTER — OFFICE VISIT (OUTPATIENT)
Dept: INTERNAL MEDICINE | Facility: CLINIC | Age: 64
End: 2019-01-09
Payer: MEDICARE

## 2019-01-09 VITALS
BODY MASS INDEX: 27.83 KG/M2 | RESPIRATION RATE: 16 BRPM | OXYGEN SATURATION: 98 % | WEIGHT: 147.3 LBS | SYSTOLIC BLOOD PRESSURE: 117 MMHG | HEART RATE: 74 BPM | DIASTOLIC BLOOD PRESSURE: 72 MMHG | TEMPERATURE: 97 F

## 2019-01-09 DIAGNOSIS — H60.392 OTHER INFECTIVE ACUTE OTITIS EXTERNA OF LEFT EAR: ICD-10-CM

## 2019-01-09 DIAGNOSIS — H92.03 OTALGIA OF BOTH EARS: Primary | ICD-10-CM

## 2019-01-09 DIAGNOSIS — H61.23 BILATERAL IMPACTED CERUMEN: ICD-10-CM

## 2019-01-09 RX ORDER — CIPROFLOXACIN AND DEXAMETHASONE 3; 1 MG/ML; MG/ML
4 SUSPENSION/ DROPS AURICULAR (OTIC) 2 TIMES DAILY
Qty: 2.8 ML | Refills: 0 | Status: SHIPPED | OUTPATIENT
Start: 2019-01-09 | End: 2019-04-23

## 2019-01-09 ASSESSMENT — PAIN SCALES - GENERAL: PAINLEVEL: EXTREME PAIN (8)

## 2019-01-09 NOTE — NURSING NOTE
Removed ear cerumen with lighted ear currette and tweezers. Obtained large amount of brown cerumen from bilateral ear. Patient tolerated the procedure well.       Bertin Doherty CMA at 1:28 PM on 1/9/2019

## 2019-01-09 NOTE — NURSING NOTE
Chief Complaint   Patient presents with     Otalgia     Patient is here for bilateral ear pain. Patient traveled on plane       Bertin Doherty CMA ([facility]) at 12:05 PM on 1/9/2019

## 2019-01-09 NOTE — PROGRESS NOTES
[facility]  Primary Care Center   Tammy Parsons, GERHARD CNP  01/09/2019      Chief Complaint:   Otalgia       History of Present Illness:   Elizabeth Palma is a 63 year old female who presents for evaluation of ear pain. Elizabeth reports that her ear pain started 1 week ago, when she flew to [place], and worsened upon her return flight a few days ago. She adds that both ears feel congested and she has had trouble hearing, but denies drainage. Pain is worse in the left ear. She also reports nasal congestion and coughing, but denies fever, sinus pain, or sore throat. She has not attempted to treat any of her current symptoms on her own.      Review of Systems:   Pertinent items are noted in HPI, remainder of complete ROS is negative.      Active Medications:   Current Outpatient Medications:      acetaminophen (TYLENOL) 325 MG tablet, Take 325-650 mg by mouth as needed, Disp: , Rfl:      acetylcysteine (N-ACETYL-L-CYSTEINE) 600 MG CAPS capsule, Take 2 400 mg caps two times daily for total daily dose of 800 mg, Disp: 30 capsule, Rfl:      albuterol (PROAIR HFA/PROVENTIL HFA/VENTOLIN HFA) 108 (90 Base) MCG/ACT inhaler, Inhale 2 puffs into the lungs every 6 hours as needed for shortness of breath / dyspnea or wheezing, Disp: 1 Inhaler, Rfl: 5     ARIPiprazole (ABILIFY) 2 MG tablet, Take 1 tablet (2 mg) by mouth daily, Disp: 30 tablet, Rfl: 3     aspirin EC 81 MG EC tablet, Take 1 tablet (81 mg) by mouth daily, Disp: 30 tablet, Rfl: 5     blood glucose monitoring (ACCU-CHEK RALPH PLUS) meter device kit, , Disp: , Rfl: 0     blood glucose monitoring (NO BRAND SPECIFIED) meter device kit, Use to test blood sugar 2 times daily or as directed., Disp: 1 kit, Rfl: 0     blood glucose monitoring (NO BRAND SPECIFIED) test strip, Use to test blood sugars 2 times daily or as directed, Disp: 100 strip, Rfl: 11     blood glucose monitoring (SOFTCLIX) lancets, Use to test blood sugar 2 times daily or as directed., Disp: 1 Box, Rfl:  11     Cholecalciferol (VITAMIN D) 1000 UNITS capsule, 2,000 Units , Disp: 30 capsule, Rfl:      ciprofloxacin-dexamethasone (CIPRODEX) 0.3-0.1 % otic suspension, Place 4 drops Into the left ear 2 times daily for 7 days, Disp: 2.8 mL, Rfl: 0     cyanocolbalamin (VITAMIN  B-12) 1000 MCG tablet, Take 1 tablet by mouth daily. (Patient taking differently: Take 1,000 mcg by mouth every other day ), Disp: 30 tablet, Rfl: 0     cycloSPORINE modified (GENERIC EQUIVALENT) 25 MG capsule, TAKE 5 CAPSULES (125MG) BY MOUTH TWO TIMES A DAY, Disp: 300 capsule, Rfl: 6     cycloSPORINE modified (GENERIC EQUIVALENT) 25 MG capsule, Take 5 capsules (125 mg) by mouth 2 times daily, Disp: 300 capsule, Rfl: 6     econazole nitrate 1 % cream, Apply topically daily, Disp: 85 g, Rfl: 3     fluticasone (FLONASE) 50 MCG/ACT spray, Spray 1 spray into both nostrils daily, Disp: 1 Bottle, Rfl: 0     furosemide (LASIX) 20 MG tablet, Take 1 tablet (20 mg) by mouth daily, Disp: 90 tablet, Rfl: 3     latanoprost (XALATAN) 0.005 % ophthalmic solution, Place 1 drop into both eyes At Bedtime, Disp: 7.5 mL, Rfl: 2     metFORMIN (GLUCOPHAGE-XR) 500 MG 24 hr tablet, Take 3 tablets (1,500 mg) by mouth daily (with dinner), Disp: 270 tablet, Rfl: 1     mycophenolate (GENERIC EQUIVALENT) 250 MG capsule, Take 4 capsules (1,000 mg) by mouth 2 times daily, Disp: 240 capsule, Rfl: 11     omeprazole (PRILOSEC) 40 MG capsule, Take 1 capsule (40 mg) by mouth daily, Disp: 90 capsule, Rfl: 3     ondansetron (ZOFRAN-ODT) 4 MG ODT tab, Take 1 tablet (4 mg) by mouth every 6 hours as needed, Disp: 20 tablet, Rfl: 3     order for DME, Walker with front wheels and a seat., Disp: 1 Units, Rfl: 0     Polyvinyl Alcohol-Povidone (REFRESH OP), Apply to eye as needed Both eyes, Disp: , Rfl:      prazosin (MINIPRESS) 5 MG capsule, Take 3 capsules (15 mg) by mouth At Bedtime, Disp: 90 capsule, Rfl: 3     simvastatin (ZOCOR) 20 MG tablet, Take 1 tablet (20 mg) by mouth At Bedtime,  Disp: 90 tablet, Rfl: 3     sulfamethoxazole-trimethoprim (BACTRIM/SEPTRA) 400-80 MG per tablet, Take 1 tablet by mouth daily, Disp: 90 tablet, Rfl: 3     topiramate (TOPAMAX) 200 MG tablet, Take 1 tablet (200 mg) by mouth 2 times daily, Disp: 60 tablet, Rfl: 5     Vaginal Lubricant (REPLENS) GEL, Use vaginally as needed. Can use up to 3 times per week., Disp: 35 g, Rfl: 11     vilazodone (VIIBRYD) 40 MG TABS tablet, Take 1 tablet (40 mg) by mouth daily, Disp: 30 tablet, Rfl: 3     Furosemide (LASIX) 20 MG tablet, Take 1 tablet (20 mg) by mouth daily, Disp: 30 tablet, Rfl: 0      Allergies:   Percocet [oxycodone-acetaminophen] and Novocain [procaine hcl]      Past Medical History:  Major depressive disorder, recurrent episode, moderate  Generalized anxiety disorder  Post-traumatic stress disorder  Nightmares associated with chronic PTSD  Narcissistic personality disorder  Glaucoma  Tremor, head  Obstructive sleep apnea  Hyperparathyroidism, secondary  Hypertension  Hyperlipidemia  Uncomplicated asthma  Diabetes mellitus, type 2, with peripheral neuropathy  Obesity  Sensory loss, bottoms of bilateral feet  Autosomal dominant polycystic kidney disease  Kidney transplant recipient,  donor  Immunosuppressed status  Carpometacarpal degenerative joint disease  Stiffness of joint, had  Pain in joint, forearm  Pain in joint involving ankle and foot  Knee pain, bilateral  Senile osteoporosis without current pathological fracture  Rib fractures  Abnormal MRI, cervical spine,   Premature menopause age 37  Restless leg syndrome    Past Surgical History:  Abdomen surgery, unspecified  Ankle surgery, unspecified  Kidney transplantation,    section, low transverse, x2  Cholecystectomy,   Eye surgery, unspecified  Laparoscopy surgical, repair incisional or ventral hernia  Orthopedic surgery,unspecified  Vinnie en y bowel,   Wrist surgery, unspecified    Family History:   Mother: Hyperlipidemia  Father:  Genetic disorder, unspecified  Other: Mental illness, heart disease, diabetes, cancer      Social History:   Marital Status:   Presents to the clinic alone  Tobacco Use: Former smoker, never used smokeless tobacco  Alcohol Use: No  PCP: Horacio Sheridan     Physical Exam:   /72 (BP Location: Right arm, Patient Position: Sitting, Cuff Size: Adult Regular)   Pulse 74   Temp 97  F (36.1  C) (Oral)   Resp 16   Wt 66.8 kg (147 lb 4.8 oz)   SpO2 98%   Breastfeeding? No   BMI 27.83 kg/m     Constitutional: no distress, comfortable, pleasant, well-groomed  Eyes: anicteric, conjunctiva pink, normal extra-ocular movements   Ears, Nose and Throat: EACs occluded with cerumen bilaterally, ear wash completed. Tympanic membranes pearly gray with positive light reflex, non-distended, clear serous fluid level on left, left EAC erythematous and edematous without drainage, , nose clear and free of lesions,no sinus tenderness, throat clear, mucosa pink and moist.   Neck: supple with full range of motion, no thyromegaly.   Cardiovascular: regular rate and rhythm, normal S1 and S2, no murmurs, rubs or gallops  Respiratory: clear to auscultation with good air movement bilaterally, no wheezes or crackles, non-labored  Psychological: appropriate mood, demonstrates intact judgment and logical thought process  LYMPH: no cervical,  supraclavicular, or infraclavicular nodes       Assessment and Plan:  Otalgia of both ears  Bilat ear pain since flight to [place]. Discussed mild eustachian tube dysfunction related to change in pressure and measures to promote drainage--stay well-hydrated, warm fluids, gentle massage around the ear, warm packs to outer ear.     Bilateral impacted cerumen  Ear wash completed, Pt tolerated well.  - REMOVE IMPACTED CERUMEN    Other infective acute otitis externa of left ear  L EAC has edema and erythema, will treat with Ciprodex drops for suspected external infection. Reviewed avoiding inserting  anything (Qtips, etc) into the ear canal.   - ciprofloxacin-dexamethasone (CIPRODEX) 0.3-0.1 % otic suspension  Dispense: 2.8 mL; Refill: 0       Follow-up: RTC if symptoms worsen or do not improve        Scribe Disclosure:   I, Tierney Mary, am serving as a scribe to document services personally performed by GERHARD Epstein CNP at this visit, based upon the provider's statements to me. All documentation has been reviewed by the aforementioned provider prior to being entered into the official medical record.     Portions of this medical record were completed by a scribe. UPON MY REVIEW AND AUTHENTICATION BY ELECTRONIC SIGNATURE, this confirms (a) I performed the applicable clinical services, and (b) the record is accurate.     GERHARD Epstein CNP

## 2019-01-13 DIAGNOSIS — J30.2 SEASONAL ALLERGIC RHINITIS: ICD-10-CM

## 2019-01-14 ENCOUNTER — OFFICE VISIT (OUTPATIENT)
Dept: ORTHOPEDICS | Facility: CLINIC | Age: 64
End: 2019-01-14
Payer: MEDICARE

## 2019-01-14 DIAGNOSIS — L60.2 ONYCHAUXIS: Primary | ICD-10-CM

## 2019-01-14 DIAGNOSIS — E11.49 TYPE II OR UNSPECIFIED TYPE DIABETES MELLITUS WITH NEUROLOGICAL MANIFESTATIONS, NOT STATED AS UNCONTROLLED(250.60) (H): ICD-10-CM

## 2019-01-14 RX ORDER — FLUTICASONE PROPIONATE 50 MCG
1 SPRAY, SUSPENSION (ML) NASAL DAILY
Qty: 16 ML | Refills: 2 | Status: SHIPPED | OUTPATIENT
Start: 2019-01-14 | End: 2020-02-25

## 2019-01-14 NOTE — PROGRESS NOTES
Past Medical History:   Diagnosis Date     Abnormal MRI, cervical spine 10/15/2011    2011; mild changes noted. Study done for left arm symptoms Impression:  1. Mild multilevel degenerative disc disease with no significant canal or neural stenosis seen. motion artifact on the STIR images in these are not interpretable. The remaining images were interpreted      Autosomal dominant polycystic kidney disease 2011     (Problem list name updated by automated process. Provider to review and confirm.)     CMC DJD(carpometacarpal degenerative joint disease), localized primary 3/5/2013     -donor kidney transplant 3/20/2014     Depressive disorder 11/15/2012     DM type 2 (diabetes mellitus, type 2) (H) 2013     Encounter for long-term (current) use of other medications 2015     Family history of tremor 10/17/2011     Generalized anxiety disorder 11/15/2012     Glaucoma      Hyperlipidemia 10/15/2011     Hyperparathyroidism, secondary (H) 2015     Hypertension     resolved     Immunosuppressed status (H) 3/20/2014     Major depressive disorder, recurrent episode, moderate (H) 11/15/2012     Obesity (BMI 30-39.9)      OP (osteoporosis) T score -3.8 2009 T-score -3.7      LION (obstructive sleep apnoea) 10/15/2012    intol to cpa     Pain in joint, forearm -- L unhealed Fx 2013     Premature menopause age 37 7/10/2012    OCP (vaginal bldg)-->HT which she stopped 2 mo later documented at 2007 visit (age 51).      Restless leg syndrome      Rib fractures 2013     Sensory loss 10/17/2011    Bottom of feet; uncertain if there is a neuropathy per notes.       Stiffness of joint, not elsewhere classified, hand 3/5/2013     Tremor 10/15/2011    head     Uncomplicated asthma      Patient Active Problem List   Diagnosis     Autosomal dominant polycystic kidney disease     Hypertension     Hyperlipidemia     Abnormal MRI, cervical spine     Sensory loss     Premature menopause  age 37     OP (osteoporosis) T score -3.8     LION on CPAP     Major depressive disorder, recurrent episode, moderate (H)     Generalized anxiety disorder     CMC DJD(carpometacarpal degenerative joint disease), localized primary     Pain in joint, forearm -- L unhealed Fx     -donor kidney transplant     Immunosuppressed status (H)     Hyperparathyroidism, secondary (H)     Hyperparathyroidism (H)     Senile osteoporosis     Pain in joint involving ankle and foot     Nightmares associated with chronic post-traumatic stress disorder     Type 2 diabetes mellitus with peripheral neuropathy (H)     Posttraumatic stress disorder     Right knee pain     Left knee pain     Age-related osteoporosis without current pathological fracture     Narcissistic personality disorder (H)     Personal history of other drug therapy     Past Surgical History:   Procedure Laterality Date     ABDOMEN SURGERY       ANKLE SURGERY       C TRANSPLANTATION OF KIDNEY  3/2014     C/SECTION, LOW TRANSVERSE      x 2     CHOLECYSTECTOMY       COLONOSCOPY       ESOPHAGOSCOPY, GASTROSCOPY, DUODENOSCOPY (EGD), COMBINED N/A 2015    Procedure: COMBINED ESOPHAGOSCOPY, GASTROSCOPY, DUODENOSCOPY (EGD);  Surgeon: Sky Davey MD;  Location:  GI     ESOPHAGOSCOPY, GASTROSCOPY, DUODENOSCOPY (EGD), COMBINED N/A 2015    Procedure: COMBINED ESOPHAGOSCOPY, GASTROSCOPY, DUODENOSCOPY (EGD), BIOPSY SINGLE OR MULTIPLE;  Surgeon: Sky Davey MD;  Location:  GI     EYE SURGERY       LAPAROSCOPY, SURGICAL; REPAIR INCISIONAL OR VENTRAL HERNIA       ORTHOPEDIC SURGERY       HERMINIA EN Y BOWEL       WRIST SURGERY       Social History     Socioeconomic History     Marital status:      Spouse name: Rahat     Number of children: 2     Years of education: Not on file     Highest education level: Not on file   Social Needs     Financial resource strain: Not on file     Food insecurity - worry: Not on file     Food insecurity -  inability: Not on file     Transportation needs - medical: Not on file     Transportation needs - non-medical: Not on file   Occupational History     Comment: part-time   Tobacco Use     Smoking status: Former Smoker     Smokeless tobacco: Never Used   Substance and Sexual Activity     Alcohol use: No     Alcohol/week: 0.0 oz     Drug use: No     Sexual activity: Yes     Partners: Male     Birth control/protection: None     Comment: 1 partner   Other Topics Concern     Not on file   Social History Narrative     Not on file     Family History   Problem Relation Age of Onset     Mental Illness Other         family hx     Heart Disease Other      Diabetes Other      Cancer Other      Genetic Disorder Father      Hyperlipidemia Mother      Glaucoma No family hx of      Macular Degeneration No family hx of      Hypertension No family hx of      Lab Results   Component Value Date    A1C 7.4 11/27/2018    A1C 6.4 01/19/2018    A1C 6.5 05/19/2017    A1C 6.2 04/13/2017    A1C 6.5 05/09/2016   SUBJECTIVE FINDINGS:  A 63-year-old female returns to clinic for onychauxis and diabetic foot check.  Relates numbness and tingling is not bad in her feet, no ulcers or sores since I have seen her last.  She relates she is wearing her diabetic shoes.        OBJECTIVE FINDINGS:  DP and PT are 2/4 bilaterally.  CFTs are less than 3 seconds bilaterally.  She has incurvated nails with some dystrophy and subungual debris bilaterally 1-5.  She has dorsally contracted digits 1-5 bilaterally.  There is no erythema, no drainage, no odor, no calor bilaterally.  There is dry scaly skin bilaterally.   She has pain on palpation dorsal right 2-3 toes.  Sharp/dull is decreased on the left 5th mpj with 5.07 [place] monofilament otherwise intact bilaterally, deep tendon reflexes are intact bilaterally.      ASSESSMENT AND PLAN:  Onychauxis bilaterally.  She is diabetic with peripheral neuropathy.  Diagnosis and treatment options were discussed  with the patient.  All of the nails were reduced bilaterally upon consent.  She was advised on lotion use.  Return to clinic and see me in about 3 months.

## 2019-01-14 NOTE — NURSING NOTE
Reason For Visit:   Chief Complaint   Patient presents with     RECHECK     3 month follow up. Diabetic foot care.        Pain Assessment  Patient Currently in Pain: Denies            Current Outpatient Medications   Medication Sig Dispense Refill     acetaminophen (TYLENOL) 325 MG tablet Take 325-650 mg by mouth as needed       acetylcysteine (N-ACETYL-L-CYSTEINE) 600 MG CAPS capsule Take 2 400 mg caps two times daily for total daily dose of 800 mg 30 capsule      albuterol (PROAIR HFA/PROVENTIL HFA/VENTOLIN HFA) 108 (90 Base) MCG/ACT inhaler Inhale 2 puffs into the lungs every 6 hours as needed for shortness of breath / dyspnea or wheezing 1 Inhaler 5     ARIPiprazole (ABILIFY) 2 MG tablet Take 1 tablet (2 mg) by mouth daily 30 tablet 3     aspirin EC 81 MG EC tablet Take 1 tablet (81 mg) by mouth daily 30 tablet 5     blood glucose monitoring (ACCU-CHEK RALPH PLUS) meter device kit   0     blood glucose monitoring (NO BRAND SPECIFIED) meter device kit Use to test blood sugar 2 times daily or as directed. 1 kit 0     blood glucose monitoring (NO BRAND SPECIFIED) test strip Use to test blood sugars 2 times daily or as directed 100 strip 11     blood glucose monitoring (SOFTCLIX) lancets Use to test blood sugar 2 times daily or as directed. 1 Box 11     Cholecalciferol (VITAMIN D) 1000 UNITS capsule 2,000 Units  30 capsule      ciprofloxacin-dexamethasone (CIPRODEX) 0.3-0.1 % otic suspension Place 4 drops Into the left ear 2 times daily for 7 days 2.8 mL 0     cyanocolbalamin (VITAMIN  B-12) 1000 MCG tablet Take 1 tablet by mouth daily. (Patient taking differently: Take 1,000 mcg by mouth every other day ) 30 tablet 0     cycloSPORINE modified (GENERIC EQUIVALENT) 25 MG capsule TAKE 5 CAPSULES (125MG) BY MOUTH TWO TIMES A  capsule 6     cycloSPORINE modified (GENERIC EQUIVALENT) 25 MG capsule Take 5 capsules (125 mg) by mouth 2 times daily 300 capsule 6     econazole nitrate 1 % cream Apply topically daily 85  g 3     fluticasone (FLONASE) 50 MCG/ACT spray Spray 1 spray into both nostrils daily 1 Bottle 0     furosemide (LASIX) 20 MG tablet Take 1 tablet (20 mg) by mouth daily 90 tablet 3     Furosemide (LASIX) 20 MG tablet Take 1 tablet (20 mg) by mouth daily 30 tablet 0     latanoprost (XALATAN) 0.005 % ophthalmic solution Place 1 drop into both eyes At Bedtime 7.5 mL 2     metFORMIN (GLUCOPHAGE-XR) 500 MG 24 hr tablet Take 3 tablets (1,500 mg) by mouth daily (with dinner) 270 tablet 1     mycophenolate (GENERIC EQUIVALENT) 250 MG capsule Take 4 capsules (1,000 mg) by mouth 2 times daily 240 capsule 11     omeprazole (PRILOSEC) 40 MG capsule Take 1 capsule (40 mg) by mouth daily 90 capsule 3     ondansetron (ZOFRAN-ODT) 4 MG ODT tab Take 1 tablet (4 mg) by mouth every 6 hours as needed 20 tablet 3     order for DME Walker with front wheels and a seat. 1 Units 0     Polyvinyl Alcohol-Povidone (REFRESH OP) Apply to eye as needed Both eyes       prazosin (MINIPRESS) 5 MG capsule Take 3 capsules (15 mg) by mouth At Bedtime 90 capsule 3     simvastatin (ZOCOR) 20 MG tablet Take 1 tablet (20 mg) by mouth At Bedtime 90 tablet 3     sulfamethoxazole-trimethoprim (BACTRIM/SEPTRA) 400-80 MG per tablet Take 1 tablet by mouth daily 90 tablet 3     topiramate (TOPAMAX) 200 MG tablet Take 1 tablet (200 mg) by mouth 2 times daily 60 tablet 5     Vaginal Lubricant (REPLENS) GEL Use vaginally as needed. Can use up to 3 times per week. 35 g 11     vilazodone (VIIBRYD) 40 MG TABS tablet Take 1 tablet (40 mg) by mouth daily 30 tablet 3          Allergies   Allergen Reactions     Percocet [Oxycodone-Acetaminophen] Nausea and Vomiting     Novocain [Procaine Hcl] Hives     Had reaction 25 years ago to old renetta. Pt reports multiple injections of lidocaine since then without reaction.  Tolerated lidocaine injection today without difficulty.  Osmar Mark MD IR Service.

## 2019-01-14 NOTE — TELEPHONE ENCOUNTER
Last Clinic Visit:  1/9/19    *Seasonal allergic rhinitis, unspecified chronicity, unspecified trigger [J30.2]

## 2019-01-14 NOTE — LETTER
2019       RE: Elizabeth Palma  [address]  [facility] [address]     Dear Colleague,    Thank you for referring your patient, Elizabeth Palma, to the [facility]. Please see a copy of my visit note below.    Past Medical History:   Diagnosis Date     Abnormal MRI, cervical spine 10/15/2011    2011; mild changes noted. Study done for left arm symptoms Impression:  1. Mild multilevel degenerative disc disease with no significant canal or neural stenosis seen. motion artifact on the STIR images in these are not interpretable. The remaining images were interpreted      Autosomal dominant polycystic kidney disease 2011     (Problem list name updated by automated process. Provider to review and confirm.)     CMC DJD(carpometacarpal degenerative joint disease), localized primary 3/5/2013     -donor kidney transplant 3/20/2014     Depressive disorder 11/15/2012     DM type 2 (diabetes mellitus, type 2) (H) 2013     Encounter for long-term (current) use of other medications 2015     Family history of tremor 10/17/2011     Generalized anxiety disorder 11/15/2012     Glaucoma      Hyperlipidemia 10/15/2011     Hyperparathyroidism, secondary (H) 2015     Hypertension     resolved     Immunosuppressed status (H) 3/20/2014     Major depressive disorder, recurrent episode, moderate (H) 11/15/2012     Obesity (BMI 30-39.9)      OP (osteoporosis) T score -3.8 2009 T-score -3.7      LION (obstructive sleep apnoea) 10/15/2012    intol to cpa     Pain in joint, forearm -- L unhealed Fx 2013     Premature menopause age 37 7/10/2012    OCP (vaginal bldg)-->HT which she stopped 2 mo later documented at 2007 visit (age 51).      Restless leg syndrome      Rib fractures 2013     Sensory loss 10/17/2011    Bottom of feet; uncertain if there is a neuropathy per notes.       Stiffness of joint,  not elsewhere classified, hand 3/5/2013     Tremor 10/15/2011    head     Uncomplicated asthma      Patient Active Problem List   Diagnosis     Autosomal dominant polycystic kidney disease     Hypertension     Hyperlipidemia     Abnormal MRI, cervical spine     Sensory loss     Premature menopause age 37     OP (osteoporosis) T score -3.8     LION on CPAP     Major depressive disorder, recurrent episode, moderate (H)     Generalized anxiety disorder     CMC DJD(carpometacarpal degenerative joint disease), localized primary     Pain in joint, forearm -- L unhealed Fx     -donor kidney transplant     Immunosuppressed status (H)     Hyperparathyroidism, secondary (H)     Hyperparathyroidism (H)     Senile osteoporosis     Pain in joint involving ankle and foot     Nightmares associated with chronic post-traumatic stress disorder     Type 2 diabetes mellitus with peripheral neuropathy (H)     Posttraumatic stress disorder     Right knee pain     Left knee pain     Age-related osteoporosis without current pathological fracture     Narcissistic personality disorder (H)     Personal history of other drug therapy     Past Surgical History:   Procedure Laterality Date     ABDOMEN SURGERY       ANKLE SURGERY       C TRANSPLANTATION OF KIDNEY  3/2014     C/SECTION, LOW TRANSVERSE      x 2     CHOLECYSTECTOMY       COLONOSCOPY       ESOPHAGOSCOPY, GASTROSCOPY, DUODENOSCOPY (EGD), COMBINED N/A 2015    Procedure: COMBINED ESOPHAGOSCOPY, GASTROSCOPY, DUODENOSCOPY (EGD);  Surgeon: Sky Davey MD;  Location:  GI     ESOPHAGOSCOPY, GASTROSCOPY, DUODENOSCOPY (EGD), COMBINED N/A 2015    Procedure: COMBINED ESOPHAGOSCOPY, GASTROSCOPY, DUODENOSCOPY (EGD), BIOPSY SINGLE OR MULTIPLE;  Surgeon: Sky Davey MD;  Location:  GI     EYE SURGERY       LAPAROSCOPY, SURGICAL; REPAIR INCISIONAL OR VENTRAL HERNIA       ORTHOPEDIC SURGERY       HERMINIA EN Y BOWEL       WRIST SURGERY       Social History      Socioeconomic History     Marital status:      Spouse name: Rahat     Number of children: 2     Years of education: Not on file     Highest education level: Not on file   Social Needs     Financial resource strain: Not on file     Food insecurity - worry: Not on file     Food insecurity - inability: Not on file     Transportation needs - medical: Not on file     Transportation needs - non-medical: Not on file   Occupational History     Comment: part-time   Tobacco Use     Smoking status: Former Smoker     Smokeless tobacco: Never Used   Substance and Sexual Activity     Alcohol use: No     Alcohol/week: 0.0 oz     Drug use: No     Sexual activity: Yes     Partners: Male     Birth control/protection: None     Comment: 1 partner   Other Topics Concern     Not on file   Social History Narrative     Not on file     Family History   Problem Relation Age of Onset     Mental Illness Other         family hx     Heart Disease Other      Diabetes Other      Cancer Other      Genetic Disorder Father      Hyperlipidemia Mother      Glaucoma No family hx of      Macular Degeneration No family hx of      Hypertension No family hx of      Lab Results   Component Value Date    A1C 7.4 11/27/2018    A1C 6.4 01/19/2018    A1C 6.5 05/19/2017    A1C 6.2 04/13/2017    A1C 6.5 05/09/2016   SUBJECTIVE FINDINGS:  A 63-year-old female returns to clinic for onychauxis and diabetic foot check.  Relates numbness and tingling is not bad in her feet, no ulcers or sores since I have seen her last.  She relates she is wearing her diabetic shoes.        OBJECTIVE FINDINGS:  DP and PT are 2/4 bilaterally.  CFTs are less than 3 seconds bilaterally.  She has incurvated nails with some dystrophy and subungual debris bilaterally 1-5.  She has dorsally contracted digits 1-5 bilaterally.  There is no erythema, no drainage, no odor, no calor bilaterally.  There is dry scaly skin bilaterally.   She has pain on palpation dorsal right 2-3 toes.   Sharp/dull is decreased on the left 5th mpj with 5.07 [place] monofilament otherwise intact bilaterally, deep tendon reflexes are intact bilaterally.      ASSESSMENT AND PLAN:  Onychauxis bilaterally.  She is diabetic with peripheral neuropathy.  Diagnosis and treatment options were discussed with the patient.  All of the nails were reduced bilaterally upon consent.  She was advised on lotion use.  Return to clinic and see me in about 3 months.      Again, thank you for allowing me to participate in the care of your patient.      Sincerely,    Javier Tuttle DPM

## 2019-01-18 ENCOUNTER — TELEPHONE (OUTPATIENT)
Dept: ENDOCRINOLOGY | Facility: CLINIC | Age: 64
End: 2019-01-18

## 2019-01-19 ENCOUNTER — OFFICE VISIT (OUTPATIENT)
Dept: INTERNAL MEDICINE | Facility: CLINIC | Age: 64
End: 2019-01-19
Payer: MEDICARE

## 2019-01-19 VITALS
HEART RATE: 81 BPM | OXYGEN SATURATION: 98 % | BODY MASS INDEX: 27.95 KG/M2 | WEIGHT: 147.9 LBS | SYSTOLIC BLOOD PRESSURE: 116 MMHG | DIASTOLIC BLOOD PRESSURE: 71 MMHG

## 2019-01-19 DIAGNOSIS — H92.02 LEFT EAR PAIN: Primary | ICD-10-CM

## 2019-01-19 RX ORDER — CIPROFLOXACIN 500 MG/1
500 TABLET, FILM COATED ORAL 2 TIMES DAILY
Qty: 14 TABLET | Refills: 0 | Status: SHIPPED | OUTPATIENT
Start: 2019-01-19 | End: 2019-03-25

## 2019-01-19 NOTE — PATIENT INSTRUCTIONS
[facility] Phone Number: [phone]   [facility] Medication Refill Request Information:  * Please contact your pharmacy regarding ANY request for medication refills.  ** [facility] Fax = [phone]  * Please allow 3 business days for routine medication refills.  * Please allow 5 business days for controlled substance medication refills.     [facility] Test Result notification information:  *You will be notified with in 7-10 days of your appointment day regarding the results of your test.  If you are on MyChart you will be notified as soon as the provider has reviewed the results and signed off on them.

## 2019-01-19 NOTE — NURSING NOTE
Chief Complaint   Patient presents with     Ear Problem     Pt c/o of left ear ache issues.       Leonie Fraser, Clinic EMT at 11:31 AM on 1/19/2019

## 2019-01-19 NOTE — TELEPHONE ENCOUNTER
Reason for call:  Medication   If this is a refill request, has the caller requested the refill from the pharmacy already? No  Will the patient be using a [facility]? No  Name of the [facility]    Name of the medication requested: topiramate (TOPAMAX) 200 MG tablet    Other request:     Phone number to reach patient:  Other phone number:  [phone], case # [id number]    Best Time:  Needs a call back by 1/21/2019     Can we leave a detailed message on this number?  YES

## 2019-01-19 NOTE — PROGRESS NOTES
CC:  F/u ear pain    HPI:  PMHx below, notable for type 2 diabetes and kidney transplant  Recently was seen in the [facility], key information copy/pasted below and reviewed with patient:    1/9/19 Tammy Parsons, APRN/CNP  Elizabeth Palma is a 63 year old female who presents for evaluation of ear pain. Elizabeth reports that her ear pain started 1 week ago, when she flew to [place], and worsened upon her return flight a few days ago. She adds that both ears feel congested and she has had trouble hearing, but denies drainage. Pain is worse in the left ear. She also reports nasal congestion and coughing, but denies fever, sinus pain, or sore throat. She has not attempted to treat any of her current symptoms on her own.     Ears, Nose and Throat: EACs occluded with cerumen bilaterally, ear wash completed. Tympanic membranes pearly gray with positive light reflex, non-distended, clear serous fluid level on left, left EAC erythematous and edematous without drainage, , nose clear and free of lesions,no sinus tenderness, throat clear, mucosa pink and moist.   LYMPH: no cervical,  supraclavicular, or infraclavicular nodes     Assessment and Plan:    Otalgia of both ears  Bilat ear pain since flight to [place]. Discussed mild eustachian tube dysfunction related to change in pressure and measures to promote drainage--stay well-hydrated, warm fluids, gentle massage around the ear, warm packs to outer ear.      Bilateral impacted cerumen  Ear wash completed, Pt tolerated well.  - REMOVE IMPACTED CERUMEN     Other infective acute otitis externa of left ear  L EAC has edema and erythema, will treat with Ciprodex drops for suspected external infection. Reviewed avoiding inserting anything (Qtips, etc) into the ear canal.   - ciprofloxacin-dexamethasone (CIPRODEX) 0.3-0.1 % otic suspension  Dispense: 2.8 mL; Refill: 0        Follow-up: RTC if symptoms worsen or do not improve    Today:  State mostly left ear persistent pain, now x one  month despite interventions on 19.  Associated with crackling/popping noises. Hearing is decreased.  No other symptoms including fever, chills, sinus pain, congestion, sore throat, swollen lymph nodes, vertigo, tinnitus, drainage, rashes, headaches.  Is using flonase, completed ciprodex, no change.    Patient Active Problem List   Diagnosis     Autosomal dominant polycystic kidney disease     Hypertension     Hyperlipidemia     Abnormal MRI, cervical spine     Sensory loss     Premature menopause age 37     OP (osteoporosis) T score -3.8     LION on CPAP     Major depressive disorder, recurrent episode, moderate (H)     Generalized anxiety disorder     CMC DJD(carpometacarpal degenerative joint disease), localized primary     Pain in joint, forearm -- L unhealed Fx     -donor kidney transplant     Immunosuppressed status (H)     Hyperparathyroidism, secondary (H)     Hyperparathyroidism (H)     Senile osteoporosis     Pain in joint involving ankle and foot     Nightmares associated with chronic post-traumatic stress disorder     Type 2 diabetes mellitus with peripheral neuropathy (H)     Posttraumatic stress disorder     Right knee pain     Left knee pain     Age-related osteoporosis without current pathological fracture     Narcissistic personality disorder (H)     Personal history of other drug therapy     Past Surgical History:   Procedure Laterality Date     ABDOMEN SURGERY       ANKLE SURGERY       C TRANSPLANTATION OF KIDNEY  3/2014     C/SECTION, LOW TRANSVERSE      x 2     CHOLECYSTECTOMY       COLONOSCOPY       ESOPHAGOSCOPY, GASTROSCOPY, DUODENOSCOPY (EGD), COMBINED N/A 2015    Procedure: COMBINED ESOPHAGOSCOPY, GASTROSCOPY, DUODENOSCOPY (EGD);  Surgeon: Sky Davey MD;  Location:  GI     ESOPHAGOSCOPY, GASTROSCOPY, DUODENOSCOPY (EGD), COMBINED N/A 2015    Procedure: COMBINED ESOPHAGOSCOPY, GASTROSCOPY, DUODENOSCOPY (EGD), BIOPSY SINGLE OR MULTIPLE;  Surgeon: Tamica  Sky Jay MD;  Location:  GI     EYE SURGERY       LAPAROSCOPY, SURGICAL; REPAIR INCISIONAL OR VENTRAL HERNIA       ORTHOPEDIC SURGERY       HERMINAI EN Y BOWEL  1990     WRIST SURGERY         Current Outpatient Medications   Medication Sig Dispense Refill     acetaminophen (TYLENOL) 325 MG tablet Take 325-650 mg by mouth as needed       acetylcysteine (N-ACETYL-L-CYSTEINE) 600 MG CAPS capsule Take 2 400 mg caps two times daily for total daily dose of 800 mg 30 capsule      albuterol (PROAIR HFA/PROVENTIL HFA/VENTOLIN HFA) 108 (90 Base) MCG/ACT inhaler Inhale 2 puffs into the lungs every 6 hours as needed for shortness of breath / dyspnea or wheezing 1 Inhaler 5     ARIPiprazole (ABILIFY) 2 MG tablet Take 1 tablet (2 mg) by mouth daily 30 tablet 3     aspirin EC 81 MG EC tablet Take 1 tablet (81 mg) by mouth daily 30 tablet 5     blood glucose monitoring (ACCU-CHEK RALPH PLUS) meter device kit   0     blood glucose monitoring (NO BRAND SPECIFIED) meter device kit Use to test blood sugar 2 times daily or as directed. 1 kit 0     blood glucose monitoring (NO BRAND SPECIFIED) test strip Use to test blood sugars 2 times daily or as directed 100 strip 11     blood glucose monitoring (SOFTCLIX) lancets Use to test blood sugar 2 times daily or as directed. 1 Box 11     Cholecalciferol (VITAMIN D) 1000 UNITS capsule 2,000 Units  30 capsule      cyanocolbalamin (VITAMIN  B-12) 1000 MCG tablet Take 1 tablet by mouth daily. (Patient taking differently: Take 1,000 mcg by mouth every other day ) 30 tablet 0     cycloSPORINE modified (GENERIC EQUIVALENT) 25 MG capsule TAKE 5 CAPSULES (125MG) BY MOUTH TWO TIMES A  capsule 6     cycloSPORINE modified (GENERIC EQUIVALENT) 25 MG capsule Take 5 capsules (125 mg) by mouth 2 times daily 300 capsule 6     econazole nitrate 1 % cream Apply topically daily 85 g 3     fluticasone (FLONASE) 50 MCG/ACT nasal spray Spray 1 spray into both nostrils daily *SHAKE LIQUID GENTLY 16 mL 2      furosemide (LASIX) 20 MG tablet Take 1 tablet (20 mg) by mouth daily 90 tablet 3     Furosemide (LASIX) 20 MG tablet Take 1 tablet (20 mg) by mouth daily 30 tablet 0     latanoprost (XALATAN) 0.005 % ophthalmic solution Place 1 drop into both eyes At Bedtime 7.5 mL 2     metFORMIN (GLUCOPHAGE-XR) 500 MG 24 hr tablet Take 3 tablets (1,500 mg) by mouth daily (with dinner) 270 tablet 1     mycophenolate (GENERIC EQUIVALENT) 250 MG capsule Take 4 capsules (1,000 mg) by mouth 2 times daily 240 capsule 11     omeprazole (PRILOSEC) 40 MG capsule Take 1 capsule (40 mg) by mouth daily 90 capsule 3     ondansetron (ZOFRAN-ODT) 4 MG ODT tab Take 1 tablet (4 mg) by mouth every 6 hours as needed 20 tablet 3     order for DME Walker with front wheels and a seat. 1 Units 0     Polyvinyl Alcohol-Povidone (REFRESH OP) Apply to eye as needed Both eyes       prazosin (MINIPRESS) 5 MG capsule Take 3 capsules (15 mg) by mouth At Bedtime 90 capsule 3     simvastatin (ZOCOR) 20 MG tablet Take 1 tablet (20 mg) by mouth At Bedtime 90 tablet 3     sulfamethoxazole-trimethoprim (BACTRIM/SEPTRA) 400-80 MG per tablet Take 1 tablet by mouth daily 90 tablet 3     topiramate (TOPAMAX) 200 MG tablet Take 1 tablet (200 mg) by mouth 2 times daily 60 tablet 5     Vaginal Lubricant (REPLENS) GEL Use vaginally as needed. Can use up to 3 times per week. 35 g 11     vilazodone (VIIBRYD) 40 MG TABS tablet Take 1 tablet (40 mg) by mouth daily 30 tablet 3     Allergies   Allergen Reactions     Percocet [Oxycodone-Acetaminophen] Nausea and Vomiting     Novocain [Procaine Hcl] Hives     Had reaction 25 years ago to old renetta. Pt reports multiple injections of lidocaine since then without reaction.  Tolerated lidocaine injection today without difficulty.  Osmar Mark MD IR Service.       /71   Pulse 81   Wt 67.1 kg (147 lb 14.4 oz)   SpO2 98%   BMI 27.95 kg/m    Physical Examination:    General:  Conversant, generally healthy appearing, no  "acute distress  Head: atraumatic  Eyes:  Pupils 2-3 mm, sclera white, EOM's full, conjunctiva moist, no lid lag    Ears:  Right EAC and TM normal.  Left ear:  No tragal tenderness, EAC appears normal.  The TM is abnormal.  Opaque, no light reflex, ?serous fluid level about 1/2 way, also red \"splotch\" on lateral TM.  Nose:  Nasal passages clear, turbinates not swollen  Throat/Mouth:  No pharyngeal erythema, exudate, ulcers, oral mucosa and tongue moist, normal hard and soft palate  Neck:  Trachea midline, Full AROM, supple, thyroid smooth, symmetric, not enlarged, no nodules, no neck lymphadenopathy  Lymph:  No cervical lymph, pre/infra auricular, submental, supraclavicular nodes.  Neuro: Alert and oriented, face symmetric. No tremor.    Psych:  Alert and oriented to person, place and time. Appropriate affect.  Not psychomotor slowed.  No signs of anxiety or agitation.    Elizabeth was seen today for ear problem.    Diagnoses and all orders for this visit:    Left ear pain x one month despite cerumen removal and ciprodex on 1/9/19. Transplant patient, diabetic. Suspect otitis media, possibly underlying eustachian tube dysfunction as well. TM appears a bit unusual and so I'd like her to see ENT as well.    -     ciprofloxacin (CIPRO) 500 MG tablet; Take 1 tablet (500 mg) by mouth 2 times daily for 7 days  -     OTOLARYNGOLOGY REFERRAL; Future ideally within one week.    Vani Smith M.D.  Internal Medicine  [facility]   pager [phone]            "

## 2019-01-21 ENCOUNTER — TELEPHONE (OUTPATIENT)
Dept: ENDOCRINOLOGY | Facility: CLINIC | Age: 64
End: 2019-01-21

## 2019-01-21 NOTE — TELEPHONE ENCOUNTER
Prior Authorization Retail Medication Request    Medication/Dose: Topiramate  ICD code (if different than what is on RX):  Morbid obesity   Previously Tried and Failed:  History of diet and exercise  Rationale:  She is a 60 year old female who I am continuing to see for treatment of obesity related to: DM-2, Hyperlipidemia, Sleep Apnea (has CPAP and does not use), GERD and Depression. Initial consult weight was 214 on 9/4/2015.  Weight change since last seen on 7/2/2018 is down 5 pounds.   Total loss is 67 pounds.  Topiramate 200 AM and 200 HS along with off gabapentin, and pain is ok. Fewer problems with evening and night eating.        Insurance Name:    Insurance ID:        Pharmacy Information (if different than what is on RX)  [facility]  [facility] [address] [place] [address] - [facility] [address] [facility] [address]  Phone:  [phone]

## 2019-01-21 NOTE — TELEPHONE ENCOUNTER
Prior Authorization Retail Medication Request    Medication/Dose: topiramate (TOPAMAX) 200 MG tablet  ICD code (if different than what is on RX):  Morbid obesity due to excess calories (H) [E66.01]   Previously Tried and Failed:  History of diet and exercise  Rationale: She is a 60 year old female who I am continuing to see for treatment of obesity related to: DM-2, Hyperlipidemia, Sleep Apnea (has CPAP and does not use), GERD and Depression.     Good progress again. Maintain topiramate 200 morning and evening. Remains off gabapentin with acceptable neuropathy pain control.        Insurance Name:    Insurance ID:        Pharmacy Information (if different than what is on RX)  Name:  [facility] [address] [place], [place] - [facility] [address] AT [facility] [address]  Phone:  [phone]

## 2019-01-21 NOTE — TELEPHONE ENCOUNTER
Reason for call:  Order   Order or referral being requested: [phone] TO GIVE PRIOR AUTH  Reason for request: PRIOR AUTH NEEDED FOR TOPIRIMATE  Date needed: as soon as possible  Has the patient been seen by the PCP for this problem? NO    Additional comments: NONE    Phone number to reach patient:  Home number on file [phone] (home)    Best Time:  ANYTIME    Can we leave a detailed message on this number?  YES

## 2019-01-21 NOTE — TELEPHONE ENCOUNTER
Reason for call:  Order   Order or referral being requested: PT NOW HAS NEW INSURANCE SO PRIOR AUTHORIZATION IS NEEDED FOR TOPIRIMATE  Reason for request: PRIOR AUTH NEEDED FOR TOPIRIMATE  Date needed: as soon as possible  Has the patient been seen by the PCP for this problem? NO    Additional comments: NONE    Phone number to reach patient:  Home number on file [phone] (home)    Best Time:  ANYTIME    Can we leave a detailed message on this number?  YES

## 2019-01-22 ENCOUNTER — ALLIED HEALTH/NURSE VISIT (OUTPATIENT)
Dept: SURGERY | Facility: CLINIC | Age: 64
End: 2019-01-22
Payer: MEDICARE

## 2019-01-22 VITALS — WEIGHT: 147.5 LBS | BODY MASS INDEX: 27.87 KG/M2

## 2019-01-22 DIAGNOSIS — Z48.298 AFTERCARE FOLLOWING ORGAN TRANSPLANT: ICD-10-CM

## 2019-01-22 DIAGNOSIS — Z94.0 KIDNEY TRANSPLANTED: ICD-10-CM

## 2019-01-22 LAB
ANION GAP SERPL CALCULATED.3IONS-SCNC: 7 MMOL/L (ref 3–14)
BUN SERPL-MCNC: 16 MG/DL (ref 7–30)
CALCIUM SERPL-MCNC: 8.6 MG/DL (ref 8.5–10.1)
CHLORIDE SERPL-SCNC: 110 MMOL/L (ref 94–109)
CO2 SERPL-SCNC: 22 MMOL/L (ref 20–32)
CREAT SERPL-MCNC: 0.92 MG/DL (ref 0.52–1.04)
CREAT UR-MCNC: 269 MG/DL
CYCLOSPORINE BLD LC/MS/MS-MCNC: 93 UG/L (ref 50–400)
ERYTHROCYTE [DISTWIDTH] IN BLOOD BY AUTOMATED COUNT: 16.5 % (ref 10–15)
GFR SERPL CREATININE-BSD FRML MDRD: 67 ML/MIN/{1.73_M2}
GLUCOSE SERPL-MCNC: 144 MG/DL (ref 70–99)
HCT VFR BLD AUTO: 39.2 % (ref 35–47)
HGB BLD-MCNC: 12.1 G/DL (ref 11.7–15.7)
MCH RBC QN AUTO: 24.8 PG (ref 26.5–33)
MCHC RBC AUTO-ENTMCNC: 30.9 G/DL (ref 31.5–36.5)
MCV RBC AUTO: 81 FL (ref 78–100)
PLATELET # BLD AUTO: 202 10E9/L (ref 150–450)
POTASSIUM SERPL-SCNC: 3.7 MMOL/L (ref 3.4–5.3)
PROT UR-MCNC: 0.44 G/L
PROT/CREAT 24H UR: 0.16 G/G CR (ref 0–0.2)
RBC # BLD AUTO: 4.87 10E12/L (ref 3.8–5.2)
SODIUM SERPL-SCNC: 139 MMOL/L (ref 133–144)
TME LAST DOSE: NORMAL H
WBC # BLD AUTO: 4.3 10E9/L (ref 4–11)

## 2019-01-22 NOTE — TELEPHONE ENCOUNTER
Central Prior Authorization Team   Phone: [phone]      PA Initiation    Medication: topiramate (TOPAMAX) 200 MG tablet-PA initiated  Insurance Company: [employer] - Phone [phone] Fax [phone]  [facility] the [facility] [address] [place] [address] - [facility] [address] AT [facility] [address]  Filling Pharmacy Phone: [phone]  Filling Pharmacy Fax:    Start Date: 1/22/2019

## 2019-01-23 ENCOUNTER — TELEPHONE (OUTPATIENT)
Dept: INTERNAL MEDICINE | Facility: CLINIC | Age: 64
End: 2019-01-23

## 2019-01-23 NOTE — TELEPHONE ENCOUNTER
Health Call Center    Phone Message    May a detailed message be left on voicemail: yes    Reason for Call: Other: Pt of Dr. Smith's calling stating that she saw Dr. Smith last Sat for her Left ear pain.  Pt stated that Dr. Smith gave her Cipor medication and it is making her sick and would like to speak with an Rn, please call pt back      Action Taken: Message routed to:  [facility] ([facility] BETTY

## 2019-01-23 NOTE — TELEPHONE ENCOUNTER
I spoke to Salud at [employer] ([phone]). She states that they spoke to a  on 1/19 and were given a fax number to send the denial. No one has scanned it into patient's chart after receiving. They will change the fax number and send me the denial sometime today after correcting that number.

## 2019-01-23 NOTE — TELEPHONE ENCOUNTER
Spoke to patient who states that she has not been able to keep the Cipro down and has been throwing it up. Patient states that she has been taking it with food and all of her medications. Advised patient to take her Cipro 1-2 hours after her other medications and with food. Patient will try this and let us know if she is able to keep it down or not. Rosi Trotter LPN 1/23/2019 11:26 AM

## 2019-01-24 NOTE — TELEPHONE ENCOUNTER
Called and left message for patient to inform her that PA for Topiramate was denied by insurance. Advised patient to check with pharmacy to see if they will let her pay out of pocket for medication. Number left for questions.

## 2019-01-24 NOTE — TELEPHONE ENCOUNTER
PRIOR AUTHORIZATION DENIED    Medication: topiramate (TOPAMAX) 200 MG tablet-PA denied    Denial Date: 1/21/2019    Denial Rational: Weight loss meds are excluded under Medicare

## 2019-01-28 ENCOUNTER — OFFICE VISIT (OUTPATIENT)
Dept: INTERNAL MEDICINE | Facility: CLINIC | Age: 64
End: 2019-01-28
Payer: COMMERCIAL

## 2019-01-28 VITALS
HEIGHT: 61 IN | SYSTOLIC BLOOD PRESSURE: 118 MMHG | BODY MASS INDEX: 27.75 KG/M2 | DIASTOLIC BLOOD PRESSURE: 77 MMHG | WEIGHT: 147 LBS | HEART RATE: 59 BPM | OXYGEN SATURATION: 99 %

## 2019-01-28 DIAGNOSIS — Z01.818 PREOPERATIVE EXAMINATION: Primary | ICD-10-CM

## 2019-01-28 DIAGNOSIS — G56.01 CARPAL TUNNEL SYNDROME OF RIGHT WRIST: ICD-10-CM

## 2019-01-28 DIAGNOSIS — N18.5 CHRONIC KIDNEY DISEASE, STAGE V (H): ICD-10-CM

## 2019-01-28 DIAGNOSIS — E11.42 TYPE 2 DIABETES MELLITUS WITH PERIPHERAL NEUROPATHY (H): ICD-10-CM

## 2019-01-28 RX ORDER — SIMVASTATIN 20 MG
20 TABLET ORAL AT BEDTIME
Qty: 90 TABLET | Refills: 3 | Status: SHIPPED | OUTPATIENT
Start: 2019-01-28 | End: 2020-02-12

## 2019-01-28 ASSESSMENT — PAIN SCALES - GENERAL: PAINLEVEL: SEVERE PAIN (6)

## 2019-01-28 ASSESSMENT — MIFFLIN-ST. JEOR: SCORE: 1169.55

## 2019-01-28 NOTE — LETTER
1/28/2019      RE: Elizabeth Palma  [address]  [facility] [address]       Surgeon: Dr. Francisco Fuentes   Fax number for Pre Op Evaluation: [phone]  Location of the surgery: [facility]  Date of the surgery: 2/7/2019  Procedure: right carpal tunnel release   History of reaction to anesthesia? : NO    [facility]     Elizabeth Palma is 63 year old female here at the request of Dr. Fuentes for cardiovascular, pulmonary, and perioperative risk assessment prior to surgery.The intended surgical procedure is right carpal tunnel release. A copy of this note will be sent to the surgeon.    A/P:  Elizabeth Palma with a history of   Patient Active Problem List    Diagnosis Date Noted     Autosomal dominant polycystic kidney disease 07/05/2011     Priority: High     (Problem list name updated by automated process. Provider to review and confirm.)       OP (osteoporosis) T score -3.8 09/21/2009     Priority: High     2007 T-score -3.7       Personal history of other drug therapy 11/13/2018     Priority: Medium     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     Priority: Medium     Age-related osteoporosis without current pathological fracture 08/10/2016     Priority: Medium     Right knee pain 02/08/2016     Priority: Medium     Left knee pain 02/08/2016     Priority: Medium     Posttraumatic stress disorder 11/24/2015     Priority: Medium     Type 2 diabetes mellitus with peripheral neuropathy (H) 11/16/2015     Priority: Medium     Nightmares associated with chronic post-traumatic stress disorder 10/27/2015     Priority: Medium     Pain in joint involving ankle and foot 07/13/2015     Priority: Medium     Senile osteoporosis 05/29/2015     Priority: Medium     Hyperparathyroidism (H) 02/26/2015     Priority: Medium     Problem list name updated by automated process. Provider to review       Hyperparathyroidism, secondary (H) 02/25/2015     Priority: Medium      -donor kidney transplant 2014     Priority: Medium     Immunosuppressed status (H) 2014     Priority: Medium     Pain in joint, forearm -- L unhealed Fx 2013     Priority: Medium     CMC DJD(carpometacarpal degenerative joint disease), localized primary 2013     Priority: Medium     Major depressive disorder, recurrent episode, moderate (H) 11/15/2012     Priority: Medium     Generalized anxiety disorder 11/15/2012     Priority: Medium     LION on CPAP 10/15/2012     Priority: Medium     Premature menopause age 37 07/10/2012     Priority: Medium     OCP (vaginal bldg)-->HT which she stopped 2 mo later documented at 2007 visit (age 51).       Sensory loss 10/17/2011     Priority: Medium     Bottom of feet; uncertain if there is a neuropathy per notes.        Hypertension 10/15/2011     Priority: Medium     Hyperlipidemia 10/15/2011     Priority: Medium     Abnormal MRI, cervical spine 10/15/2011     Priority: Medium     2011; mild changes noted. Study done for left arm symptoms  Impression:   1. Mild multilevel degenerative disc disease with no significant canal  or neural stenosis seen. motion artifact on the STIR images in these  are not interpretable. The remaining images were interpreted       presents prior to surgery for assessment of perioperative risk. The patient is at LOW risk for cardiovascular complications and at LOW risk for pulmonary complications of this MODERATE risk surgery.    The proposed surgical procedure is considered LOW risk.    REVISED CARDIAC RISK INDEX  The patient has the following serious cardiovascular risks for perioperative complications such as (MI, PE, VFib and 3  AV Block):  No serious cardiac risks  INTERPRETATION: 0 risks: Class I (very low risk - 0.4% complication rate)    --Approval given to proceed with proposed procedure, without further diagnostic evaluation  --Patient is currently taking    Anticoagulant or Antiplatelet  Medication Use  ASPIRIN: Discontinue ASA 7-10 days prior to procedure to reduce bleeding risk.  It should be resumed post-operatively.      The patient has been told to not take any aspirin or NSAIDs for 10 days prior to surgery.  The patient has been instructed as to what to do with medications prior to surgery.    Laboratory studies:  None    Please contact our office if there are any further questions or information required about this patient.    Discussed with staff, Dr. Fatimah Norris, MS3    I, Horacio Sheridan, was present with the medical student who participated in the service and in the documentation of the note.  I have verified the history and personally performed the physical exam and medical decision making.  I agree with the assessment and plan of care as documented in the note.      --------------------------------------------------------    HPI:   Reason for surgery:  Elizabeth has been having significant pain with shooting sensations from her elbow down to her 3rd, 4th and 5th digits of her right hand for the past 2 years. The pain is very severe at night and prevents her from sleeping. This will be the third time she is having surgery on her right hand. Her first surgery on her wrist was 15 years ago after a fall. She subsequently has had 2 carpal tunnel surgeries. She has not noticed that she is dropping objects with her right hand.     Other HPI: She has intentionally lost 70 lbs in the last 3 years. She has had ear pain and plugging for the past 2 months. She had acute otitis media that was treated with many different antibiotics. Despite antibiotic treatment she still feels ear plugging and pain.     Cardiovascular Risk:  This patient does not have chest pain with ambulation or walking up a flight of stairs.  There is not any chest pain with exercise.  There is not a history of heart disease or valve problems.    Pulmonary Risk:  The patient does not have a history of lung  problems.    Perioperative Complications:  The patient does not have a history of bleeding or clotting problems in the past. The patient has not had complications from past surgeries.  The patient does not have a family history of any anesthesia or surgical complications.    ROS:  Constitutional: no fevers, night sweats or unintentional weight change   Eyes: no vision change, diplopia or red eyes   Ears, Nose, Mouth, Throat: no tinnitus or hearing change, Pain in the left ear for the past 2 months,  no epistaxis or nasal discharge, no oral lesions, Throat- sore.   Cardiovascular: no chest pain, palpitations, or pain with walking, no orthopnea or PND   Respiratory: no dyspnea, cough, shortness of breath or wheezing   GI: no nausea, vomiting, diarrhea or constipation, no abdominal pain   : no change in urine, no dysuria or hematuria  Musculoskeletal: no joint or muscle pain or swelling   Integumentary: no concerning lesions or moles   Neuro: no loss of strength or sensation, no numbness or tingling, no tremor, no dizziness, no headache   Endo: no polyuria or polydipsia, no temperature intolerance   Heme/Lymph: no concerning bumps, no bleeding problems   Allergy: no environmental allergies   Psych: no depression or anxiety, no sleep problems    Family History   Problem Relation Age of Onset     Mental Illness Other         family hx     Heart Disease Other      Diabetes Other      Cancer Other      Genetic Disorder Father      Hyperlipidemia Mother      Glaucoma No family hx of      Macular Degeneration No family hx of      Hypertension No family hx of      Social History     Socioeconomic History     Marital status:      Spouse name: Rahat     Number of children: 2     Years of education: Not on file     Highest education level: Not on file   Social Needs     Financial resource strain: Not on file     Food insecurity - worry: Not on file     Food insecurity - inability: Not on file     Transportation needs -  medical: Not on file     Transportation needs - non-medical: Not on file   Occupational History     Comment: part-time   Tobacco Use     Smoking status: Former Smoker     Smokeless tobacco: Never Used   Substance and Sexual Activity     Alcohol use: No     Alcohol/week: 0.0 oz     Drug use: No     Sexual activity: Yes     Partners: Male     Birth control/protection: None     Comment: 1 partner   Other Topics Concern     Not on file   Social History Narrative     Not on file     Patient Active Problem List   Diagnosis     Autosomal dominant polycystic kidney disease     Hypertension     Hyperlipidemia     Abnormal MRI, cervical spine     Sensory loss     Premature menopause age 37     OP (osteoporosis) T score -3.8     LION on CPAP     Major depressive disorder, recurrent episode, moderate (H)     Generalized anxiety disorder     CMC DJD(carpometacarpal degenerative joint disease), localized primary     Pain in joint, forearm -- L unhealed Fx     -donor kidney transplant     Immunosuppressed status (H)     Hyperparathyroidism, secondary (H)     Hyperparathyroidism (H)     Senile osteoporosis     Pain in joint involving ankle and foot     Nightmares associated with chronic post-traumatic stress disorder     Type 2 diabetes mellitus with peripheral neuropathy (H)     Posttraumatic stress disorder     Right knee pain     Left knee pain     Age-related osteoporosis without current pathological fracture     Narcissistic personality disorder (H)     Personal history of other drug therapy     Past Surgical History:   Procedure Laterality Date     ABDOMEN SURGERY       ANKLE SURGERY       C TRANSPLANTATION OF KIDNEY  3/2014     C/SECTION, LOW TRANSVERSE      x 2     CHOLECYSTECTOMY       COLONOSCOPY       ESOPHAGOSCOPY, GASTROSCOPY, DUODENOSCOPY (EGD), COMBINED N/A 2015    Procedure: COMBINED ESOPHAGOSCOPY, GASTROSCOPY, DUODENOSCOPY (EGD);  Surgeon: Sky Davey MD;  Location: [facility]      ESOPHAGOSCOPY, GASTROSCOPY, DUODENOSCOPY (EGD), COMBINED N/A 5/19/2015    Procedure: COMBINED ESOPHAGOSCOPY, GASTROSCOPY, DUODENOSCOPY (EGD), BIOPSY SINGLE OR MULTIPLE;  Surgeon: Sky Davey MD;  Location:  GI     EYE SURGERY       LAPAROSCOPY, SURGICAL; REPAIR INCISIONAL OR VENTRAL HERNIA       ORTHOPEDIC SURGERY       HERMINIA EN Y BOWEL  1990     WRIST SURGERY       Current Outpatient Medications   Medication     acetaminophen (TYLENOL) 325 MG tablet     acetylcysteine (N-ACETYL-L-CYSTEINE) 600 MG CAPS capsule     albuterol (PROAIR HFA/PROVENTIL HFA/VENTOLIN HFA) 108 (90 Base) MCG/ACT inhaler     ARIPiprazole (ABILIFY) 2 MG tablet     aspirin EC 81 MG EC tablet     blood glucose monitoring (ACCU-CHEK RALPH PLUS) meter device kit     blood glucose monitoring (NO BRAND SPECIFIED) meter device kit     blood glucose monitoring (NO BRAND SPECIFIED) test strip     blood glucose monitoring (SOFTCLIX) lancets     Cholecalciferol (VITAMIN D) 1000 UNITS capsule     cyanocolbalamin (VITAMIN  B-12) 1000 MCG tablet     cycloSPORINE modified (GENERIC EQUIVALENT) 25 MG capsule     cycloSPORINE modified (GENERIC EQUIVALENT) 25 MG capsule     econazole nitrate 1 % cream     fluticasone (FLONASE) 50 MCG/ACT nasal spray     furosemide (LASIX) 20 MG tablet     Furosemide (LASIX) 20 MG tablet     latanoprost (XALATAN) 0.005 % ophthalmic solution     metFORMIN (GLUCOPHAGE-XR) 500 MG 24 hr tablet     mycophenolate (GENERIC EQUIVALENT) 250 MG capsule     omeprazole (PRILOSEC) 40 MG capsule     ondansetron (ZOFRAN-ODT) 4 MG ODT tab     order for DME     Polyvinyl Alcohol-Povidone (REFRESH OP)     prazosin (MINIPRESS) 5 MG capsule     simvastatin (ZOCOR) 20 MG tablet     sulfamethoxazole-trimethoprim (BACTRIM/SEPTRA) 400-80 MG per tablet     topiramate (TOPAMAX) 200 MG tablet     Vaginal Lubricant (REPLENS) GEL     vilazodone (VIIBRYD) 40 MG TABS tablet     No current facility-administered medications for this visit.   "    Immunization History   Administered Date(s) Administered     FLU 6-35 months 10/04/2015, 09/24/2016     Hep B, Peds or Adolescent 02/04/2010     HepB 02/04/2010, 03/17/2010, 08/09/2010     HepB, Unspecified 03/17/2010, 08/09/2010     Influenza (H1N1) 11/24/2009     Influenza (IIV3) PF 11/05/1999, 09/01/2008, 10/01/2009, 10/26/2011, 09/15/2012, 10/01/2013, 10/01/2014, 10/01/2015     Influenza Vaccine IM 3yrs+ 4 Valent IIV4 09/25/2016, 09/29/2017, 10/01/2018     Influenza Vaccine, 3 YRS +, IM (QUADRIVALENT W/PRESERVATIVES) 09/29/2017     Mantoux Tuberculin Skin Test 02/15/2010     Pneumococcal 23 valent 11/25/2008     TDAP Vaccine (Adacel) 01/01/2004, 10/12/2012     TDAP Vaccine (Boostrix) 10/12/2012     Tetanus 04/05/2005       PHYSICAL EXAM:  /77 (BP Location: Left arm, Patient Position: Sitting)   Pulse 59   Ht 1.55 m (5' 1.02\")   Wt 66.7 kg (147 lb)   LMP  (LMP Unknown)   SpO2 99%   Breastfeeding? No   BMI 27.75 kg/m       Wt Readings from Last 1 Encounters:   01/28/19 66.7 kg (147 lb)       Constitutional: no distress, comfortable, pleasant   Eyes: anicteric, normal extra-ocular movements   Ears, Nose and Throat: Ears- Left ear drum translucent non erythematous with small red macule at 5 o'clock position. Purulent brown effusion behind the ear drum. Right ear tympanic membrane clear without an effusion.  throat posterior pharynx erythematous without findings of thrush or mucous, neck supple with full range of motion  Cardiovascular: regular rate and rhythm, normal S1 and S2, no murmurs, rubs or gallops, peripheral pulses full and symmetric   Respiratory: clear to auscultation, no wheezes or crackles, normal breath sounds   Neurological: cranial nerves intact, normal strength and sensation, normal gait, bilateral resting hand tremor, decreased  strength right hand compared to the left had.   Psychological: appropriate mood   Lymphatic: no cervical, axillary or inguinal lymphadenopathy    EKG:  " EKG was not indicated based on risk assessment.              Horacio Sheridan MD

## 2019-01-28 NOTE — PROGRESS NOTES
Elizabeth Palma is 63 year old female here at the request of Dr. Fuentes for cardiovascular, pulmonary, and perioperative risk assessment prior to surgery.The intended surgical procedure is right carpal tunnel release. A copy of this note will be sent to the surgeon.    A/P:  Elizabeth Palma with a history of   Patient Active Problem List    Diagnosis Date Noted     Autosomal dominant polycystic kidney disease 2011     Priority: High     (Problem list name updated by automated process. Provider to review and confirm.)       OP (osteoporosis) T score -3.8 2009     Priority: High      T-score -3.7       Personal history of other drug therapy 2018     Priority: Medium     Narcissistic personality disorder (H) 2018     Priority: Medium     Age-related osteoporosis without current pathological fracture 08/10/2016     Priority: Medium     Right knee pain 2016     Priority: Medium     Left knee pain 2016     Priority: Medium     Posttraumatic stress disorder 2015     Priority: Medium     Type 2 diabetes mellitus with peripheral neuropathy (H) 2015     Priority: Medium     Nightmares associated with chronic post-traumatic stress disorder 10/27/2015     Priority: Medium     Pain in joint involving ankle and foot 2015     Priority: Medium     Senile osteoporosis 2015     Priority: Medium     Hyperparathyroidism (H) 2015     Priority: Medium     Problem list name updated by automated process. Provider to review       Hyperparathyroidism, secondary (H) 2015     Priority: Medium     -donor kidney transplant 2014     Priority: Medium     Immunosuppressed status (H) 2014     Priority: Medium     Pain in joint, forearm -- L unhealed Fx 2013     Priority: Medium     CMC DJD(carpometacarpal degenerative joint disease), localized primary 2013     Priority: Medium     Major depressive disorder, recurrent episode, moderate (H)  11/15/2012     Priority: Medium     Generalized anxiety disorder 11/15/2012     Priority: Medium     LION on CPAP 10/15/2012     Priority: Medium     Premature menopause age 37 07/10/2012     Priority: Medium     OCP (vaginal bldg)-->HT which she stopped 2 mo later documented at Jan 12, 2007 visit (age 51).       Sensory loss 10/17/2011     Priority: Medium     Bottom of feet; uncertain if there is a neuropathy per notes.        Hypertension 10/15/2011     Priority: Medium     Hyperlipidemia 10/15/2011     Priority: Medium     Abnormal MRI, cervical spine 10/15/2011     Priority: Medium     4/2011; mild changes noted. Study done for left arm symptoms  Impression:   1. Mild multilevel degenerative disc disease with no significant canal  or neural stenosis seen. motion artifact on the STIR images in these  are not interpretable. The remaining images were interpreted       presents prior to surgery for assessment of perioperative risk. The patient is at LOW risk for cardiovascular complications and at LOW risk for pulmonary complications of this MODERATE risk surgery.    The proposed surgical procedure is considered LOW risk.    REVISED CARDIAC RISK INDEX  The patient has the following serious cardiovascular risks for perioperative complications such as (MI, PE, VFib and 3  AV Block):  No serious cardiac risks  INTERPRETATION: 0 risks: Class I (very low risk - 0.4% complication rate)    --Approval given to proceed with proposed procedure, without further diagnostic evaluation  --Patient is currently taking    Anticoagulant or Antiplatelet Medication Use  ASPIRIN: Discontinue ASA 7-10 days prior to procedure to reduce bleeding risk.  It should be resumed post-operatively.      The patient has been told to not take any aspirin or NSAIDs for 10 days prior to surgery.  The patient has been instructed as to what to do with medications prior to surgery.    Laboratory studies:  None    Please contact our office if there are  any further questions or information required about this patient.    Discussed with staff, Dr. Fatimah Norris, MS3    I, Horacio Sheridan, was present with the medical student who participated in the service and in the documentation of the note.  I have verified the history and personally performed the physical exam and medical decision making.  I agree with the assessment and plan of care as documented in the note.      --------------------------------------------------------    HPI:   Reason for surgery:  Elizabeth has been having significant pain with shooting sensations from her elbow down to her 3rd, 4th and 5th digits of her right hand for the past 2 years. The pain is very severe at night and prevents her from sleeping. This will be the third time she is having surgery on her right hand. Her first surgery on her wrist was 15 years ago after a fall. She subsequently has had 2 carpal tunnel surgeries. She has not noticed that she is dropping objects with her right hand.     Other HPI: She has intentionally lost 70 lbs in the last 3 years. She has had ear pain and plugging for the past 2 months. She had acute otitis media that was treated with many different antibiotics. Despite antibiotic treatment she still feels ear plugging and pain.     Cardiovascular Risk:  This patient does not have chest pain with ambulation or walking up a flight of stairs.  There is not any chest pain with exercise.  There is not a history of heart disease or valve problems.    Pulmonary Risk:  The patient does not have a history of lung problems.    Perioperative Complications:  The patient does not have a history of bleeding or clotting problems in the past. The patient has not had complications from past surgeries.  The patient does not have a family history of any anesthesia or surgical complications.    ROS:  Constitutional: no fevers, night sweats or unintentional weight change   Eyes: no vision change, diplopia or red eyes    Ears, Nose, Mouth, Throat: no tinnitus or hearing change, Pain in the left ear for the past 2 months,  no epistaxis or nasal discharge, no oral lesions, Throat- sore.   Cardiovascular: no chest pain, palpitations, or pain with walking, no orthopnea or PND   Respiratory: no dyspnea, cough, shortness of breath or wheezing   GI: no nausea, vomiting, diarrhea or constipation, no abdominal pain   : no change in urine, no dysuria or hematuria  Musculoskeletal: no joint or muscle pain or swelling   Integumentary: no concerning lesions or moles   Neuro: no loss of strength or sensation, no numbness or tingling, no tremor, no dizziness, no headache   Endo: no polyuria or polydipsia, no temperature intolerance   Heme/Lymph: no concerning bumps, no bleeding problems   Allergy: no environmental allergies   Psych: no depression or anxiety, no sleep problems    Family History   Problem Relation Age of Onset     Mental Illness Other         family hx     Heart Disease Other      Diabetes Other      Cancer Other      Genetic Disorder Father      Hyperlipidemia Mother      Glaucoma No family hx of      Macular Degeneration No family hx of      Hypertension No family hx of      Social History     Socioeconomic History     Marital status:      Spouse name: Rahat     Number of children: 2     Years of education: Not on file     Highest education level: Not on file   Social Needs     Financial resource strain: Not on file     Food insecurity - worry: Not on file     Food insecurity - inability: Not on file     Transportation needs - medical: Not on file     Transportation needs - non-medical: Not on file   Occupational History     Comment: part-time   Tobacco Use     Smoking status: Former Smoker     Smokeless tobacco: Never Used   Substance and Sexual Activity     Alcohol use: No     Alcohol/week: 0.0 oz     Drug use: No     Sexual activity: Yes     Partners: Male     Birth control/protection: None     Comment: 1 partner    Other Topics Concern     Not on file   Social History Narrative     Not on file     Patient Active Problem List   Diagnosis     Autosomal dominant polycystic kidney disease     Hypertension     Hyperlipidemia     Abnormal MRI, cervical spine     Sensory loss     Premature menopause age 37     OP (osteoporosis) T score -3.8     LION on CPAP     Major depressive disorder, recurrent episode, moderate (H)     Generalized anxiety disorder     CMC DJD(carpometacarpal degenerative joint disease), localized primary     Pain in joint, forearm -- L unhealed Fx     -donor kidney transplant     Immunosuppressed status (H)     Hyperparathyroidism, secondary (H)     Hyperparathyroidism (H)     Senile osteoporosis     Pain in joint involving ankle and foot     Nightmares associated with chronic post-traumatic stress disorder     Type 2 diabetes mellitus with peripheral neuropathy (H)     Posttraumatic stress disorder     Right knee pain     Left knee pain     Age-related osteoporosis without current pathological fracture     Narcissistic personality disorder (H)     Personal history of other drug therapy     Past Surgical History:   Procedure Laterality Date     ABDOMEN SURGERY       ANKLE SURGERY       C TRANSPLANTATION OF KIDNEY  3/2014     C/SECTION, LOW TRANSVERSE      x 2     CHOLECYSTECTOMY       COLONOSCOPY       ESOPHAGOSCOPY, GASTROSCOPY, DUODENOSCOPY (EGD), COMBINED N/A 2015    Procedure: COMBINED ESOPHAGOSCOPY, GASTROSCOPY, DUODENOSCOPY (EGD);  Surgeon: Sky Davey MD;  Location:  GI     ESOPHAGOSCOPY, GASTROSCOPY, DUODENOSCOPY (EGD), COMBINED N/A 2015    Procedure: COMBINED ESOPHAGOSCOPY, GASTROSCOPY, DUODENOSCOPY (EGD), BIOPSY SINGLE OR MULTIPLE;  Surgeon: Sky Davey MD;  Location:  GI     EYE SURGERY       LAPAROSCOPY, SURGICAL; REPAIR INCISIONAL OR VENTRAL HERNIA       ORTHOPEDIC SURGERY       HERMINIA EN Y BOWEL       WRIST SURGERY       Current Outpatient Medications    Medication     acetaminophen (TYLENOL) 325 MG tablet     acetylcysteine (N-ACETYL-L-CYSTEINE) 600 MG CAPS capsule     albuterol (PROAIR HFA/PROVENTIL HFA/VENTOLIN HFA) 108 (90 Base) MCG/ACT inhaler     ARIPiprazole (ABILIFY) 2 MG tablet     aspirin EC 81 MG EC tablet     blood glucose monitoring (ACCU-CHEK RALPH PLUS) meter device kit     blood glucose monitoring (NO BRAND SPECIFIED) meter device kit     blood glucose monitoring (NO BRAND SPECIFIED) test strip     blood glucose monitoring (SOFTCLIX) lancets     Cholecalciferol (VITAMIN D) 1000 UNITS capsule     cyanocolbalamin (VITAMIN  B-12) 1000 MCG tablet     cycloSPORINE modified (GENERIC EQUIVALENT) 25 MG capsule     cycloSPORINE modified (GENERIC EQUIVALENT) 25 MG capsule     econazole nitrate 1 % cream     fluticasone (FLONASE) 50 MCG/ACT nasal spray     furosemide (LASIX) 20 MG tablet     Furosemide (LASIX) 20 MG tablet     latanoprost (XALATAN) 0.005 % ophthalmic solution     metFORMIN (GLUCOPHAGE-XR) 500 MG 24 hr tablet     mycophenolate (GENERIC EQUIVALENT) 250 MG capsule     omeprazole (PRILOSEC) 40 MG capsule     ondansetron (ZOFRAN-ODT) 4 MG ODT tab     order for DME     Polyvinyl Alcohol-Povidone (REFRESH OP)     prazosin (MINIPRESS) 5 MG capsule     simvastatin (ZOCOR) 20 MG tablet     sulfamethoxazole-trimethoprim (BACTRIM/SEPTRA) 400-80 MG per tablet     topiramate (TOPAMAX) 200 MG tablet     Vaginal Lubricant (REPLENS) GEL     vilazodone (VIIBRYD) 40 MG TABS tablet     No current facility-administered medications for this visit.      Immunization History   Administered Date(s) Administered     FLU 6-35 months 10/04/2015, 09/24/2016     Hep B, Peds or Adolescent 02/04/2010     HepB 02/04/2010, 03/17/2010, 08/09/2010     HepB, Unspecified 03/17/2010, 08/09/2010     Influenza (H1N1) 11/24/2009     Influenza (IIV3) PF 11/05/1999, 09/01/2008, 10/01/2009, 10/26/2011, 09/15/2012, 10/01/2013, 10/01/2014, 10/01/2015     Influenza Vaccine IM 3yrs+ 4  "Valent IIV4 09/25/2016, 09/29/2017, 10/01/2018     Influenza Vaccine, 3 YRS +, IM (QUADRIVALENT W/PRESERVATIVES) 09/29/2017     Mantoux Tuberculin Skin Test 02/15/2010     Pneumococcal 23 valent 11/25/2008     TDAP Vaccine (Adacel) 01/01/2004, 10/12/2012     TDAP Vaccine (Boostrix) 10/12/2012     Tetanus 04/05/2005       PHYSICAL EXAM:  /77 (BP Location: Left arm, Patient Position: Sitting)   Pulse 59   Ht 1.55 m (5' 1.02\")   Wt 66.7 kg (147 lb)   LMP  (LMP Unknown)   SpO2 99%   Breastfeeding? No   BMI 27.75 kg/m      Wt Readings from Last 1 Encounters:   01/28/19 66.7 kg (147 lb)       Constitutional: no distress, comfortable, pleasant   Eyes: anicteric, normal extra-ocular movements   Ears, Nose and Throat: Ears- Left ear drum translucent non erythematous with small red macule at 5 o'clock position. Purulent brown effusion behind the ear drum. Right ear tympanic membrane clear without an effusion.  throat posterior pharynx erythematous without findings of thrush or mucous, neck supple with full range of motion  Cardiovascular: regular rate and rhythm, normal S1 and S2, no murmurs, rubs or gallops, peripheral pulses full and symmetric   Respiratory: clear to auscultation, no wheezes or crackles, normal breath sounds   Neurological: cranial nerves intact, normal strength and sensation, normal gait, bilateral resting hand tremor, decreased  strength right hand compared to the left had.   Psychological: appropriate mood   Lymphatic: no cervical, axillary or inguinal lymphadenopathy    EKG:  EKG was not indicated based on risk assessment.            "

## 2019-01-28 NOTE — PATIENT INSTRUCTIONS
[facility] Medication Refill Request Information:  * Please contact your pharmacy regarding ANY request for medication refills.  ** [facility] Prescription Fax = [phone]  * Please allow 3 business days for routine medication refills.  * Please allow 5 business days for controlled substance medication refills.     [facility] Test Result notification information:  *You will be notified with in 7-10 days of your appointment day regarding the results of your test.  If you are on MyChart you will be notified as soon as the provider has reviewed the results and signed off on them.    [facility]: [phone]

## 2019-01-28 NOTE — NURSING NOTE
Chief Complaint   Patient presents with     Pre-Op Exam     patient is here for pre op, wrist surgery on 2/7/2019       Claudia Colindres, EMT at 8:49 AM on 1/28/2019.

## 2019-01-28 NOTE — PROGRESS NOTES
Surgeon: Dr. Francisco Fuentes   Fax number for Pre Op Evaluation: [phone]  Location of the surgery: [facility] [place]  Date of the surgery: 2/7/2019  Procedure: right carpal tunnel release   History of reaction to anesthesia? : NO    [facility] orthopedics

## 2019-02-11 NOTE — TELEPHONE ENCOUNTER
FUTURE VISIT INFORMATION      FUTURE VISIT INFORMATION:    Date: 2/13/19    Time: 10AM (Audio)/ 11AM (ENT)    [place] [facility] [place]  REFERRAL INFORMATION:    Referring provider:  Vani Smith MD    Referring providers clinic:  [facility]    Reason for visit/diagnosis  Left ear pain     RECORDS REQUESTED FROM:       Clinic name Comments Records Status Imaging Status   [facility] 1/19/19, 5/20/13 notes with Dr Smith  1/9/19 notes with Tammy Parsons NP  4/14/17 notes with Dr Horacio Sheridan  1/4/16, 5/1/14 Nurse notes   Epic

## 2019-02-12 ENCOUNTER — OFFICE VISIT (OUTPATIENT)
Dept: PSYCHIATRY | Facility: CLINIC | Age: 64
End: 2019-02-12
Payer: MEDICARE

## 2019-02-12 DIAGNOSIS — F51.5 NIGHTMARES ASSOCIATED WITH CHRONIC POST-TRAUMATIC STRESS DISORDER: ICD-10-CM

## 2019-02-12 DIAGNOSIS — F43.12 NIGHTMARES ASSOCIATED WITH CHRONIC POST-TRAUMATIC STRESS DISORDER: ICD-10-CM

## 2019-02-12 DIAGNOSIS — F41.1 GENERALIZED ANXIETY DISORDER: ICD-10-CM

## 2019-02-12 DIAGNOSIS — F33.1 MAJOR DEPRESSIVE DISORDER, RECURRENT EPISODE, MODERATE (H): Primary | ICD-10-CM

## 2019-02-12 DIAGNOSIS — H91.90 HEARING LOSS: Primary | ICD-10-CM

## 2019-02-12 RX ORDER — PRAZOSIN HYDROCHLORIDE 5 MG/1
15 CAPSULE ORAL AT BEDTIME
Qty: 90 CAPSULE | Refills: 3 | Status: SHIPPED | OUTPATIENT
Start: 2019-02-12 | End: 2019-04-23

## 2019-02-12 RX ORDER — ARIPIPRAZOLE 2 MG/1
2 TABLET ORAL DAILY
Qty: 30 TABLET | Refills: 3 | Status: SHIPPED | OUTPATIENT
Start: 2019-02-12 | End: 2019-06-14

## 2019-02-12 RX ORDER — VILAZODONE HYDROCHLORIDE 40 MG/1
40 TABLET ORAL DAILY
Qty: 30 TABLET | Refills: 3 | Status: SHIPPED | OUTPATIENT
Start: 2019-02-12 | End: 2019-06-30
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
## 2019-02-13 ENCOUNTER — OFFICE VISIT (OUTPATIENT)
Dept: AUDIOLOGY | Facility: CLINIC | Age: 64
End: 2019-02-13
Payer: MEDICARE

## 2019-02-13 ENCOUNTER — OFFICE VISIT (OUTPATIENT)
Dept: OTOLARYNGOLOGY | Facility: CLINIC | Age: 64
End: 2019-02-13
Payer: MEDICARE

## 2019-02-13 ENCOUNTER — PRE VISIT (OUTPATIENT)
Dept: OTOLARYNGOLOGY | Facility: CLINIC | Age: 64
End: 2019-02-13

## 2019-02-13 DIAGNOSIS — H93.8X2 EAR PRESSURE, LEFT: Primary | ICD-10-CM

## 2019-02-13 DIAGNOSIS — H91.90 HEARING LOSS: ICD-10-CM

## 2019-02-13 DIAGNOSIS — H93.12 TINNITUS OF LEFT EAR: ICD-10-CM

## 2019-02-13 DIAGNOSIS — H92.02 LEFT EAR PAIN: ICD-10-CM

## 2019-02-13 ASSESSMENT — PATIENT HEALTH QUESTIONNAIRE - PHQ9: SUM OF ALL RESPONSES TO PHQ QUESTIONS 1-9: 8

## 2019-02-13 ASSESSMENT — PAIN SCALES - GENERAL: PAINLEVEL: NO PAIN (1)

## 2019-02-13 NOTE — LETTER
"2019      RE: Elizabeth Palma  [address]  [place] [address]       Reason for Visit:  Elizabeth Palma is seen in consultation from Dr. Smith.      History of Present Illness:  She is a 63-year-old female being seen for left ear pain and fullness.  Her symptoms began about a month to six weeks ago when she was on an airplane.  She reports that the left ear became infected and painful following the flight.  Prior to flying, she had an upper respiratory infection.  She has no prior history of any of this.    She could not equalize her middle ear on the plane.    Since then, her symptoms have been uncomfortable and she feels that she \"still has fluid\". I asked about a hearing loss and she reported that she did not have one but she hears better out of her right ear.  She tells me that she has had multiple courses of antibiotics without resolution of her symptoms    Past Medical History:   Diagnosis Date     Abnormal MRI, cervical spine 10/15/2011    2011; mild changes noted. Study done for left arm symptoms Impression:  1. Mild multilevel degenerative disc disease with no significant canal or neural stenosis seen. motion artifact on the STIR images in these are not interpretable. The remaining images were interpreted      Autosomal dominant polycystic kidney disease 2011     (Problem list name updated by automated process. Provider to review and confirm.)     CMC DJD(carpometacarpal degenerative joint disease), localized primary 3/5/2013     -donor kidney transplant 3/20/2014     Depressive disorder 11/15/2012     DM type 2 (diabetes mellitus, type 2) (H) 2013     Encounter for long-term (current) use of other medications 2015     Family history of tremor 10/17/2011     Gastroesophageal reflux disease      Generalized anxiety disorder 11/15/2012     Glaucoma      Hyperlipidemia 10/15/2011     Hyperparathyroidism, secondary (H) 2015     Hypertension     resolved "     Immunosuppressed status (H) 3/20/2014     Major depressive disorder, recurrent episode, moderate (H) 11/15/2012     Obesity (BMI 30-39.9)      OP (osteoporosis) T score -3.8 9/21/2009 2007 T-score -3.7      LION (obstructive sleep apnoea) 10/15/2012    intol to cpa     Pain in joint, forearm -- L unhealed Fx 5/21/2013     Premature menopause age 37 7/10/2012    OCP (vaginal bldg)-->HT which she stopped 2 mo later documented at Jan 12, 2007 visit (age 51).      Restless leg syndrome      Rib fractures 4/13/2013     Sensory loss 10/17/2011    Bottom of feet; uncertain if there is a neuropathy per notes.       Stiffness of joint, not elsewhere classified, hand 3/5/2013     Tremor 10/15/2011    head     Uncomplicated asthma        Past Surgical History:   Procedure Laterality Date     ABDOMEN SURGERY       ANKLE SURGERY       C TRANSPLANTATION OF KIDNEY  3/2014     C/SECTION, LOW TRANSVERSE      x 2     CHOLECYSTECTOMY  1990     COLONOSCOPY       ESOPHAGOSCOPY, GASTROSCOPY, DUODENOSCOPY (EGD), COMBINED N/A 5/19/2015    Procedure: COMBINED ESOPHAGOSCOPY, GASTROSCOPY, DUODENOSCOPY (EGD);  Surgeon: Sky Davey MD;  Location:  GI     ESOPHAGOSCOPY, GASTROSCOPY, DUODENOSCOPY (EGD), COMBINED N/A 5/19/2015    Procedure: COMBINED ESOPHAGOSCOPY, GASTROSCOPY, DUODENOSCOPY (EGD), BIOPSY SINGLE OR MULTIPLE;  Surgeon: Sky Davey MD;  Location:  GI     EYE SURGERY       LAPAROSCOPY, SURGICAL; REPAIR INCISIONAL OR VENTRAL HERNIA       ORTHOPEDIC SURGERY       HERMINIA EN Y BOWEL  1990     WRIST SURGERY         Family History   Problem Relation Age of Onset     Mental Illness Other         family hx     Heart Disease Other      Diabetes Other      Cancer Other      Genetic Disorder Father      Mental Illness Father      Diabetes Father      Hypertension Father      Hyperlipidemia Mother      Diabetes Mother      Hypertension Mother      Mental Illness Other      Diabetes Other      Glaucoma No family hx of       Macular Degeneration No family hx of      Hypertension No family hx of        Social History     Tobacco Use     Smoking status: Former Smoker     Smokeless tobacco: Never Used   Substance Use Topics     Alcohol use: No     Alcohol/week: 0.0 oz     Drug use: No       Patient Supplied Answers to Review of Systems  No flowsheet data found.  The remainder of the 10 point review of systems is otherwise negative.    Physical Examination:  Constitutional:  In no acute distress, appears stated age  Eyes:  Extraocular movements intact, no spontaneous nystagmus  Ears:  Both ears examined under the microscope.  There was minimal cerumen on both sides.  The left tympanic membrane is obviously retracted.  The right drum appeared more healthy and appeared in the neutral position.  The left tympanic membrane has an area of redness in the posterior inferior quadrant which is consistent with blood in the tympanic membrane itself.  It is possible that this was a bullous myringitis that is now resolving  Respiratory:  No increased work of breathing, wheezing or stridor  Musculoskeletal:  Good upper extremity strength  Skin:  No rashes on the head and neck  Neurologic:  House Brackmann 1/6 bilaterally, ambulating normally  Psychiatric:  Alert, normal affect, answering questions appropriately    Audiogram: An audiogram was obtained today and shows that her hearing thresholds are relatively normal with a 7 dB right and 18 dB left pure-tone average.  The tympanogram on the left is at -134 in the right is at -64.  Her crossed reflexes are always absent.    Assessment and Plan: This is suggestive of a otitis media that probably was caused from relative eustachian tube dysfunction on the plane.  While she is at the six-week marker only, 50% of these cases are completely resolved.  I have reassured her that in the next month that her symptoms should completely resolve.  If this fails to occur, I have also told her that a myringotomy can  be completed to ventilate the ear and resolve the discomfort.  A PE tube may or may not be needed.  I will make an appointment for her and get tympanograms prior to the visit.  I assume that this should completely resolve and that no further management will be necessary.      SERAFIN/ms Candido Anaya MD

## 2019-02-13 NOTE — PATIENT INSTRUCTIONS
You will need  to schedule a follow up appointment in 1 month with a new tympanometry test.       Please call our clinic for any questions,concerns,or worsening symptoms.      Clinic #[phone]       Option 3  for the ENT Care Team.       Option 1 for scheduling.    Kathy LAROSE RNCC  [phone]

## 2019-02-13 NOTE — PROGRESS NOTES
AUDIOLOGY REPORT    SUMMARY: Audiology visit completed. See audiogram for results.      RECOMMENDATIONS: Follow-up with ENT.      Monse Mcghee, F-AAA   Clinical Audiologist, [place] #[id number]   2/13/2019

## 2019-02-13 NOTE — LETTER
"2019       RE: Elizabeth Palma  [address]  [facility] [address]     Dear Colleague,    Thank you for referring your patient, Elizabeth Palma, to the [facility] EAR NOSE AND THROAT at [facility]. Please see a copy of my visit note below.    Reason for Visit:  Elizabeth Palma is seen in consultation from Dr. Smith.      History of Present Illness:  She is a 63-year-old female being seen for left ear pain and fullness. Her symptoms began about a month to six weeks ago when she was on an airplane.  She reports that the left ear became infected and painful following the flight.  Prior to flying, she had an upper respiratory infection.  She has no prior history of any of this.    She could not equalize her middle ear on the plane.    Since then, her symptoms have been uncomfortable and she feels that she \"still has fluid\". I asked about a hearing loss and she reported that she did not have one but she hears better out of her right ear.  She tells me that she has had multiple courses of antibiotics without resolution of her symptoms    Past Medical History:   Diagnosis Date     Abnormal MRI, cervical spine 10/15/2011    2011; mild changes noted. Study done for left arm symptoms Impression:  1. Mild multilevel degenerative disc disease with no significant canal or neural stenosis seen. motion artifact on the STIR images in these are not interpretable. The remaining images were interpreted      Autosomal dominant polycystic kidney disease 2011     (Problem list name updated by automated process. Provider to review and confirm.)     CMC DJD(carpometacarpal degenerative joint disease), localized primary 3/5/2013     -donor kidney transplant 3/20/2014     Depressive disorder 11/15/2012     DM type 2 (diabetes mellitus, type 2) (H) 2013     Encounter for long-term (current) use of other medications 2015     Family history of tremor " 10/17/2011     Gastroesophageal reflux disease      Generalized anxiety disorder 11/15/2012     Glaucoma      Hyperlipidemia 10/15/2011     Hyperparathyroidism, secondary (H) 2/25/2015     Hypertension     resolved     Immunosuppressed status (H) 3/20/2014     Major depressive disorder, recurrent episode, moderate (H) 11/15/2012     Obesity (BMI 30-39.9)      OP (osteoporosis) T score -3.8 9/21/2009    2007 T-score -3.7      LION (obstructive sleep apnoea) 10/15/2012    intol to cpa     Pain in joint, forearm -- L unhealed Fx 5/21/2013     Premature menopause age 37 7/10/2012    OCP (vaginal bldg)-->HT which she stopped 2 mo later documented at Jan 12, 2007 visit (age 51).      Restless leg syndrome      Rib fractures 4/13/2013     Sensory loss 10/17/2011    Bottom of feet; uncertain if there is a neuropathy per notes.       Stiffness of joint, not elsewhere classified, hand 3/5/2013     Tremor 10/15/2011    head     Uncomplicated asthma        Past Surgical History:   Procedure Laterality Date     ABDOMEN SURGERY       ANKLE SURGERY       C TRANSPLANTATION OF KIDNEY  3/2014     C/SECTION, LOW TRANSVERSE      x 2     CHOLECYSTECTOMY  1990     COLONOSCOPY       ESOPHAGOSCOPY, GASTROSCOPY, DUODENOSCOPY (EGD), COMBINED N/A 5/19/2015    Procedure: COMBINED ESOPHAGOSCOPY, GASTROSCOPY, DUODENOSCOPY (EGD);  Surgeon: Sky Davey MD;  Location:  GI     ESOPHAGOSCOPY, GASTROSCOPY, DUODENOSCOPY (EGD), COMBINED N/A 5/19/2015    Procedure: COMBINED ESOPHAGOSCOPY, GASTROSCOPY, DUODENOSCOPY (EGD), BIOPSY SINGLE OR MULTIPLE;  Surgeon: Sky Davey MD;  Location:  GI     EYE SURGERY       LAPAROSCOPY, SURGICAL; REPAIR INCISIONAL OR VENTRAL HERNIA       ORTHOPEDIC SURGERY       HERMINIA EN Y BOWEL  1990     WRIST SURGERY         Family History   Problem Relation Age of Onset     Mental Illness Other         family hx     Heart Disease Other      Diabetes Other      Cancer Other      Genetic Disorder Father       Mental Illness Father      Diabetes Father      Hypertension Father      Hyperlipidemia Mother      Diabetes Mother      Hypertension Mother      Mental Illness Other      Diabetes Other      Glaucoma No family hx of      Macular Degeneration No family hx of      Hypertension No family hx of        Social History     Tobacco Use     Smoking status: Former Smoker     Smokeless tobacco: Never Used   Substance Use Topics     Alcohol use: No     Alcohol/week: 0.0 oz     Drug use: No       Patient Supplied Answers to Review of Systems  No flowsheet data found.  The remainder of the 10 point review of systems is otherwise negative.    Physical Examination:  Constitutional:  In no acute distress, appears stated age  Eyes:  Extraocular movements intact, no spontaneous nystagmus  Ears:  Both ears examined under the microscope.  There was minimal cerumen on both sides.  The left tympanic membrane is obviously retracted.  The right drum appeared more healthy and appeared in the neutral position.  The left tympanic membrane has an area of redness in the posterior inferior quadrant which is consistent with blood in the tympanic membrane itself.  It is possible that this was a bullous myringitis that is now resolving  Respiratory:  No increased work of breathing, wheezing or stridor  Musculoskeletal:  Good upper extremity strength  Skin:  No rashes on the head and neck  Neurologic:  House Brackmann 1/6 bilaterally, ambulating normally  Psychiatric:  Alert, normal affect, answering questions appropriately    Audiogram: An audiogram was obtained today and shows that her hearing thresholds are relatively normal with a 7 dB right and 18 dB left pure-tone average.  The tympanogram on the left is at -134 in the right is at -64.  Her crossed reflexes are always absent.    Assessment and Plan: This is suggestive of a otitis media that probably was caused from relative eustachian tube dysfunction on the plane.  While she is at the  six-week marker only, 50% of these cases are completely resolved.  I have reassured her that in the next month that her symptoms should completely resolve.  If this fails to occur, I have also told her that a myringotomy can be completed to ventilate the ear and resolve the discomfort.  A PE tube may or may not be needed.  I will make an appointment for her and get tympanograms prior to the visit.  I assume that this should completely resolve and that no further management will be necessary.    SL/ms

## 2019-02-13 NOTE — PROGRESS NOTES
"Reason for Visit:  Elizabeth Palma is seen in consultation from Dr. Smith.      History of Present Illness:  She is a 63-year-old female being seen for left ear pain and fullness.  Her symptoms began about a month to six weeks ago when she was on an airplane.  She reports that the left ear became infected and painful following the flight.  Prior to flying, she had an upper respiratory infection.  She has no prior history of any of this.    She could not equalize her middle ear on the plane.    Since then, her symptoms have been uncomfortable and she feels that she \"still has fluid\". I asked about a hearing loss and she reported that she did not have one but she hears better out of her right ear.  She tells me that she has had multiple courses of antibiotics without resolution of her symptoms    Past Medical History:   Diagnosis Date     Abnormal MRI, cervical spine 10/15/2011    2011; mild changes noted. Study done for left arm symptoms Impression:  1. Mild multilevel degenerative disc disease with no significant canal or neural stenosis seen. motion artifact on the STIR images in these are not interpretable. The remaining images were interpreted      Autosomal dominant polycystic kidney disease 2011     (Problem list name updated by automated process. Provider to review and confirm.)     CMC DJD(carpometacarpal degenerative joint disease), localized primary 3/5/2013     -donor kidney transplant 3/20/2014     Depressive disorder 11/15/2012     DM type 2 (diabetes mellitus, type 2) (H) 2013     Encounter for long-term (current) use of other medications 2015     Family history of tremor 10/17/2011     Gastroesophageal reflux disease      Generalized anxiety disorder 11/15/2012     Glaucoma      Hyperlipidemia 10/15/2011     Hyperparathyroidism, secondary (H) 2015     Hypertension     resolved     Immunosuppressed status (H) 3/20/2014     Major depressive disorder, recurrent episode, " moderate (H) 11/15/2012     Obesity (BMI 30-39.9)      OP (osteoporosis) T score -3.8 9/21/2009 2007 T-score -3.7      LION (obstructive sleep apnoea) 10/15/2012    intol to cpa     Pain in joint, forearm -- L unhealed Fx 5/21/2013     Premature menopause age 37 7/10/2012    OCP (vaginal bldg)-->HT which she stopped 2 mo later documented at Jan 12, 2007 visit (age 51).      Restless leg syndrome      Rib fractures 4/13/2013     Sensory loss 10/17/2011    Bottom of feet; uncertain if there is a neuropathy per notes.       Stiffness of joint, not elsewhere classified, hand 3/5/2013     Tremor 10/15/2011    head     Uncomplicated asthma        Past Surgical History:   Procedure Laterality Date     ABDOMEN SURGERY       ANKLE SURGERY       C TRANSPLANTATION OF KIDNEY  3/2014     C/SECTION, LOW TRANSVERSE      x 2     CHOLECYSTECTOMY  1990     COLONOSCOPY       ESOPHAGOSCOPY, GASTROSCOPY, DUODENOSCOPY (EGD), COMBINED N/A 5/19/2015    Procedure: COMBINED ESOPHAGOSCOPY, GASTROSCOPY, DUODENOSCOPY (EGD);  Surgeon: Sky aDvey MD;  Location:  GI     ESOPHAGOSCOPY, GASTROSCOPY, DUODENOSCOPY (EGD), COMBINED N/A 5/19/2015    Procedure: COMBINED ESOPHAGOSCOPY, GASTROSCOPY, DUODENOSCOPY (EGD), BIOPSY SINGLE OR MULTIPLE;  Surgeon: Sky Davey MD;  Location:  GI     EYE SURGERY       LAPAROSCOPY, SURGICAL; REPAIR INCISIONAL OR VENTRAL HERNIA       ORTHOPEDIC SURGERY       HERMINIA EN Y BOWEL  1990     WRIST SURGERY         Family History   Problem Relation Age of Onset     Mental Illness Other         family hx     Heart Disease Other      Diabetes Other      Cancer Other      Genetic Disorder Father      Mental Illness Father      Diabetes Father      Hypertension Father      Hyperlipidemia Mother      Diabetes Mother      Hypertension Mother      Mental Illness Other      Diabetes Other      Glaucoma No family hx of      Macular Degeneration No family hx of      Hypertension No family hx of        Social  History     Tobacco Use     Smoking status: Former Smoker     Smokeless tobacco: Never Used   Substance Use Topics     Alcohol use: No     Alcohol/week: 0.0 oz     Drug use: No       Patient Supplied Answers to Review of Systems  No flowsheet data found.  The remainder of the 10 point review of systems is otherwise negative.    Physical Examination:  Constitutional:  In no acute distress, appears stated age  Eyes:  Extraocular movements intact, no spontaneous nystagmus  Ears:  Both ears examined under the microscope.  There was minimal cerumen on both sides.  The left tympanic membrane is obviously retracted.  The right drum appeared more healthy and appeared in the neutral position.  The left tympanic membrane has an area of redness in the posterior inferior quadrant which is consistent with blood in the tympanic membrane itself.  It is possible that this was a bullous myringitis that is now resolving  Respiratory:  No increased work of breathing, wheezing or stridor  Musculoskeletal:  Good upper extremity strength  Skin:  No rashes on the head and neck  Neurologic:  House Brackmann 1/6 bilaterally, ambulating normally  Psychiatric:  Alert, normal affect, answering questions appropriately    Audiogram: An audiogram was obtained today and shows that her hearing thresholds are relatively normal with a 7 dB right and 18 dB left pure-tone average.  The tympanogram on the left is at -134 in the right is at -64.  Her crossed reflexes are always absent.    Assessment and Plan: This is suggestive of a otitis media that probably was caused from relative eustachian tube dysfunction on the plane.  While she is at the six-week marker only, 50% of these cases are completely resolved.  I have reassured her that in the next month that her symptoms should completely resolve.  If this fails to occur, I have also told her that a myringotomy can be completed to ventilate the ear and resolve the discomfort.  A PE tube may or may not  be needed.  I will make an appointment for her and get tympanograms prior to the visit.  I assume that this should completely resolve and that no further management will be necessary.      SL/ms

## 2019-02-18 ENCOUNTER — TRANSFERRED RECORDS (OUTPATIENT)
Dept: HEALTH INFORMATION MANAGEMENT | Facility: CLINIC | Age: 64
End: 2019-02-18

## 2019-02-26 ENCOUNTER — ALLIED HEALTH/NURSE VISIT (OUTPATIENT)
Dept: SURGERY | Facility: CLINIC | Age: 64
End: 2019-02-26
Payer: MEDICARE

## 2019-02-26 ENCOUNTER — TELEPHONE (OUTPATIENT)
Dept: TRANSPLANT | Facility: CLINIC | Age: 64
End: 2019-02-26

## 2019-02-26 VITALS — WEIGHT: 149.8 LBS | BODY MASS INDEX: 28.28 KG/M2

## 2019-02-26 DIAGNOSIS — Z48.298 AFTERCARE FOLLOWING ORGAN TRANSPLANT: ICD-10-CM

## 2019-02-26 DIAGNOSIS — Z94.0 KIDNEY TRANSPLANTED: Primary | ICD-10-CM

## 2019-02-26 DIAGNOSIS — Z94.0 KIDNEY TRANSPLANTED: ICD-10-CM

## 2019-02-26 LAB
ANION GAP SERPL CALCULATED.3IONS-SCNC: 9 MMOL/L (ref 3–14)
BUN SERPL-MCNC: 16 MG/DL (ref 7–30)
CALCIUM SERPL-MCNC: 8.5 MG/DL (ref 8.5–10.1)
CHLORIDE SERPL-SCNC: 111 MMOL/L (ref 94–109)
CO2 SERPL-SCNC: 22 MMOL/L (ref 20–32)
CREAT SERPL-MCNC: 0.9 MG/DL (ref 0.52–1.04)
CYCLOSPORINE BLD LC/MS/MS-MCNC: 68 UG/L (ref 50–400)
ERYTHROCYTE [DISTWIDTH] IN BLOOD BY AUTOMATED COUNT: 16.2 % (ref 10–15)
GFR SERPL CREATININE-BSD FRML MDRD: 69 ML/MIN/{1.73_M2}
GLUCOSE SERPL-MCNC: 151 MG/DL (ref 70–99)
HCT VFR BLD AUTO: 36.6 % (ref 35–47)
HGB BLD-MCNC: 11.1 G/DL (ref 11.7–15.7)
MCH RBC QN AUTO: 25.3 PG (ref 26.5–33)
MCHC RBC AUTO-ENTMCNC: 30.3 G/DL (ref 31.5–36.5)
MCV RBC AUTO: 83 FL (ref 78–100)
PLATELET # BLD AUTO: 202 10E9/L (ref 150–450)
POTASSIUM SERPL-SCNC: 4 MMOL/L (ref 3.4–5.3)
RBC # BLD AUTO: 4.39 10E12/L (ref 3.8–5.2)
SODIUM SERPL-SCNC: 142 MMOL/L (ref 133–144)
TME LAST DOSE: NORMAL H
WBC # BLD AUTO: 3.2 10E9/L (ref 4–11)

## 2019-02-26 PROCEDURE — 80158 DRUG ASSAY CYCLOSPORINE: CPT | Performed by: INTERNAL MEDICINE

## 2019-02-26 NOTE — TELEPHONE ENCOUNTER
ISSUE:  CSA level 68  Goal   Current dose 125 mg BID    OUTCOME:   Call placed to pt. Pt reports good 12 hour trough and confirms her current IS dose 125 mg BID.  Pt denies recent illnesses, medication changes or missed IS doses.  D/T pt prior levels being at goal, pt will cont on same dose and repeat level in 1 week.  Pt aware if level remains low, RNCC will adjust dose as needed.     Pt questions answered, pt v/u.

## 2019-02-26 NOTE — PROGRESS NOTES
"Nutrition Reassessment  Reason For Visit:  Elizabeth Palma is a 63 year-old female with type 2 DM (oral med management) presenting today for nutrition follow-up, s/p \"gastric bypass in 1990 at [facility]\" per Pt report.  She is seeing medical weight management.  She was referred by Dr. Irene.  Note: Pt had a kidney transplant on March 20, 2014.    Pt was accompanied by her .     Anthropometrics:  Height: 61\"  Pre-op Weight: 265 lbs per Pt report; lowest weight after bariatric surgery: 165-170 lbs (25 years ago)  (Pt reported that she initially was at 215 lbs in 2015)    Current Weight: 149.8 lbs (+2.3 lbs in the past month)    Current Vitamins/Minerals: 3000 International Units vitamin D/day, Calcium 2x daily, vitamin C, vitamin B12 daily, B-complex  *Pt stated that she was told not to take other vitamins/minerals by MD.     Nutrition History:  Lactose intolerance ever since bariatric surgery.  Pt stated that she has osteoporosis; however, she stated that her doctors don't want her to take any vitamins or minerals due to her kidney transplant.    Diet/Nutrition Intake Hx:   On previous RD visits, pt reported her intake is affected by nausea 2/2 her anti-rejection medications.  Pt is also limited in protein choices that she likes - pt reports she does not take much dairy, does not like beans and lentils, keeps kosher. Pt also stated on previous visits that she will not record food intake due to the fact that every time she does this, she simply does not eat as she doesn't want to record.  She stated that her therapist strongly advises against recording food intake for this reason.     Physical Activity Note: She reported doing some walking in hallways and in hallways at work, but no structured exercise. At a previous RD visit reported that she has a tendency to break bones so she is limited with what exercises she does.     Diet recall:   Toast with butter, half bagel with smoked salmon cream cheese  Half " "sandwich(lean corn beef or tuna occ PB) or baked potato with butter S&P occ veggies  Fish or hamburger or steak or eggs  Snack: peanuts or half orange, popcorn, pineapple, \"protein cookies\"(90 calories with 3gm protein - [employer] thinaddictives)    Progress with Previous Goals:   1. Eat 3 meals with lean protein at each meal, along with a non-starchy vegetable or whole fruit, up to 1/2 c carb at a meal. Eating 2-3 meals per day    -Can try a protein drink for breakfast meal Such as [facility] drinks or Integrated supplement (no sugar substitute and has a small sized container to try), pure protein, muscle. or protein water ([facility] clear protein drink or Bipro or Premier clear). Can add protein powder into coffee for example.  2. If needing a snack, have vegetables or protein snack (give at least 2 hours between a meals and a snack). Will have peanuts, fruit, or cookies for snacks  3. Walk 10 min daily, increasing as able. Couple times per week   4. Continue working on Meal planning for all meals. Meal planning more     Nutrition Prescription:   Grams Protein: 60 (minimum) - Not meeting consistently.   Amount of Fluid: 48-64 oz    Nutrition Diagnosis  Current: Overweight related to history of excessive energy intake, variable eating habits/intake and lack of sufficient exercise as evidenced by pt report and BMI > 25. continues    Intervention  Intervention At Appointment:  Materials/Education provided: Reviewed her progress towards previous goals.  Patient is eating three meals more regularly and they have been planning dinner ahead making it easier to prepare at night.  She continues to try to find protein sources to add to her diet.  Exercise decreased over the past month.  Patient also reported having wrist surgery and possibly needing to have the other wrist done in the future.  Patient stated she still has protein chips at home and did not need any protein bars today.  Gave encouragement and support.    Patient " Understanding: good  Expected Compliance: good with continued RD follow up.     Goals:   1. Eat 3 meals with lean protein at each meal, along with a non-starchy vegetable or whole fruit, up to 1/2 c carb at a meal.   -Can try a protein drink for breakfast meal Such as [facility] drinks or Integrated supplement (no sugar substitute and has a small sized container to try), pure protein, muscle. or protein water ([facility] clear protein drink or Bipro or Premier clear). Can add protein powder into coffee for example.  2. If needing a snack, have vegetables or protein snack (give at least 2 hours between a meals and a snack).  3. Walk 10 min daily, increasing as able.  4. Continue working on Meal planning for all meals.    Follow-Up: 1 month or PRN     Time spent with patient: 15 minutes.  Leyla Guerrero, RD, LD

## 2019-02-27 ENCOUNTER — MYC MEDICAL ADVICE (OUTPATIENT)
Dept: NEPHROLOGY | Facility: CLINIC | Age: 64
End: 2019-02-27

## 2019-02-27 NOTE — TELEPHONE ENCOUNTER
ISSUE:  Mychart message from pt:  What were my latest test results?     OUTCOME:   Call placed to pt.  Pt reports she is unable to see her lab results on mychart.  Results reviewed with pt. Pt aware her glucose is elevated at 151.  Pt confirms she is on metformin. Pt will f/u with Dr Sheridan who is managing her metformin and glucose levels to see if dose needs to be increased or ins to be started.   Pt v/u.  Pt also reports she is still planing on repeat drug levels next week.     Pt questions answered, pt v/u.

## 2019-03-04 DIAGNOSIS — Z94.0 KIDNEY TRANSPLANTED: ICD-10-CM

## 2019-03-04 LAB
CYCLOSPORINE BLD LC/MS/MS-MCNC: 75 UG/L (ref 50–400)
TME LAST DOSE: NORMAL H

## 2019-03-04 PROCEDURE — 80158 DRUG ASSAY CYCLOSPORINE: CPT | Performed by: INTERNAL MEDICINE

## 2019-03-07 ENCOUNTER — TRANSFERRED RECORDS (OUTPATIENT)
Dept: HEALTH INFORMATION MANAGEMENT | Facility: CLINIC | Age: 64
End: 2019-03-07

## 2019-03-11 PROBLEM — G56.12: Status: ACTIVE | Noted: 2019-03-11

## 2019-03-14 ENCOUNTER — OFFICE VISIT (OUTPATIENT)
Dept: OPHTHALMOLOGY | Facility: CLINIC | Age: 64
End: 2019-03-14
Attending: OPHTHALMOLOGY
Payer: MEDICARE

## 2019-03-14 DIAGNOSIS — H04.123 BILATERAL DRY EYES: ICD-10-CM

## 2019-03-14 DIAGNOSIS — Z96.1 PSEUDOPHAKIA: ICD-10-CM

## 2019-03-14 DIAGNOSIS — H40.003 GLAUCOMA SUSPECT OF BOTH EYES: Primary | ICD-10-CM

## 2019-03-14 DIAGNOSIS — E11.9 TYPE 2 DIABETES MELLITUS WITHOUT RETINOPATHY (H): ICD-10-CM

## 2019-03-14 PROCEDURE — 92083 EXTENDED VISUAL FIELD XM: CPT | Mod: ZF | Performed by: OPHTHALMOLOGY

## 2019-03-14 PROCEDURE — 92133 CPTRZD OPH DX IMG PST SGM ON: CPT | Mod: ZF | Performed by: OPHTHALMOLOGY

## 2019-03-14 PROCEDURE — G0463 HOSPITAL OUTPT CLINIC VISIT: HCPCS | Mod: ZF

## 2019-03-14 ASSESSMENT — VISUAL ACUITY
METHOD: SNELLEN - LINEAR
OS_CC+: +2
OS_PH_CC: 20/25
OD_CC: 20/20
CORRECTION_TYPE: GLASSES
OS_PH_CC+: +2
OS_CC: 20/30

## 2019-03-14 ASSESSMENT — REFRACTION_WEARINGRX
OD_CYLINDER: +1.75
OS_CYLINDER: +2.00
OD_ADD: +2.50
OD_AXIS: 054
OD_SPHERE: -1.75
OS_ADD: +2.50
OS_SPHERE: -1.25
OS_AXIS: 106

## 2019-03-14 ASSESSMENT — TONOMETRY
IOP_METHOD: TONOPEN
OD_IOP_MMHG: 14
OS_IOP_MMHG: 13

## 2019-03-14 ASSESSMENT — CONF VISUAL FIELD
OS_NORMAL: 1
METHOD: COUNTING FINGERS
OD_NORMAL: 1

## 2019-03-14 ASSESSMENT — CUP TO DISC RATIO
OD_RATIO: 0.8
OS_RATIO: 0.8

## 2019-03-14 ASSESSMENT — EXTERNAL EXAM - RIGHT EYE: OD_EXAM: NORMAL

## 2019-03-14 ASSESSMENT — EXTERNAL EXAM - LEFT EYE: OS_EXAM: NORMAL

## 2019-03-14 NOTE — PROGRESS NOTES
Chief Complaint(s) and History of Present Illness(es)     Annual DM eye exam and glaucoma follow up.    The patient notes her vision is stable.  The patient denies eye pain.  She uses Latanoprost at bedtime in both eyes.  Lab Results       Component                Value               Date                       A1C                      7.4                 11/27/2018                 A1C                      6.4                 01/19/2018                 A1C                      6.5                 05/19/2017                 A1C                      6.2                 04/13/2017                 A1C                      6.5                 05/09/2016            JUAN ANTONIO Grimm 2:31 PM 03/14/2019       Review of systems for the eyes was negative other than the pertinent positives/negatives listed in the HPI.      Assessment & Plan      Elizabeth Palma is a 63 year old female with the following diagnoses:   1. Glaucoma suspect of both eyes    2. Type 2 diabetes mellitus without retinopathy (H)    3. Bilateral dry eyes    4. Pseudophakia - Both Eyes       Maximum intraocular pressure 21/18  Family history: negative  Trauma history: negative  Pachymetry : 532/527     Using latanoprost at bedtime both eyes with good compliance  Intraocular pressure great both eyes   Visual field and OCT Nerve fiber layer stable both eyes   No retinopathy  Stressed good glycemic and hypertensive control  Monitor yearly     Continue artificial tears twice a day and as needed   Warm compresses as needed     Patient disposition:   Return in about 1 year (around 3/14/2020) for DFE, OCT NFL, 24-2 Dynamic VF.          Attending Physician Attestation:  Complete documentation of historical and exam elements from today's encounter can be found in the full encounter summary report (not reduplicated in this progress note).  I personally obtained the chief complaint(s) and history of present illness.  I confirmed and edited as necessary the  review of systems, past medical/surgical history, family history, social history, and examination findings as documented by others; and I examined the patient myself.  I personally reviewed the relevant tests, images, and reports as documented above.  I formulated and edited as necessary the assessment and plan and discussed the findings and management plan with the patient and family. . - Yonas Underwood MD

## 2019-03-14 NOTE — NURSING NOTE
Chief Complaints and History of Present Illnesses   Patient presents with     Annual Eye Exam     Chief Complaint(s) and History of Present Illness(es)     Annual DM eye exam and glaucoma follow up.    The patient notes her vision is stable.  The patient denies eye pain.  Lab Results       Component                Value               Date                       A1C                      7.4                 11/27/2018                 A1C                      6.4                 01/19/2018                 A1C                      6.5                 05/19/2017                 A1C                      6.2                 04/13/2017                 A1C                      6.5                 05/09/2016            JUAN ANTONIO Grimm 2:31 PM 03/14/2019

## 2019-03-25 ENCOUNTER — TRANSFERRED RECORDS (OUTPATIENT)
Dept: HEALTH INFORMATION MANAGEMENT | Facility: CLINIC | Age: 64
End: 2019-03-25

## 2019-03-25 ENCOUNTER — OFFICE VISIT (OUTPATIENT)
Dept: NEPHROLOGY | Facility: CLINIC | Age: 64
End: 2019-03-25
Attending: INTERNAL MEDICINE
Payer: MEDICARE

## 2019-03-25 ENCOUNTER — OFFICE VISIT (OUTPATIENT)
Dept: ENDOCRINOLOGY | Facility: CLINIC | Age: 64
End: 2019-03-25
Payer: MEDICARE

## 2019-03-25 VITALS
WEIGHT: 149.6 LBS | HEART RATE: 77 BPM | HEIGHT: 61 IN | DIASTOLIC BLOOD PRESSURE: 75 MMHG | OXYGEN SATURATION: 97 % | BODY MASS INDEX: 28.25 KG/M2 | SYSTOLIC BLOOD PRESSURE: 130 MMHG

## 2019-03-25 VITALS
OXYGEN SATURATION: 99 % | TEMPERATURE: 98.4 F | WEIGHT: 149.4 LBS | DIASTOLIC BLOOD PRESSURE: 85 MMHG | HEART RATE: 58 BPM | SYSTOLIC BLOOD PRESSURE: 137 MMHG | BODY MASS INDEX: 28.21 KG/M2

## 2019-03-25 DIAGNOSIS — Q61.2 AUTOSOMAL DOMINANT POLYCYSTIC KIDNEY DISEASE: ICD-10-CM

## 2019-03-25 DIAGNOSIS — Z94.0 KIDNEY TRANSPLANTED: ICD-10-CM

## 2019-03-25 DIAGNOSIS — D84.9 IMMUNOSUPPRESSED STATUS (H): ICD-10-CM

## 2019-03-25 DIAGNOSIS — I15.1 HYPERTENSION SECONDARY TO OTHER RENAL DISORDERS: ICD-10-CM

## 2019-03-25 DIAGNOSIS — D84.9 IMMUNOSUPPRESSION (H): ICD-10-CM

## 2019-03-25 DIAGNOSIS — E11.42 TYPE 2 DIABETES MELLITUS WITH PERIPHERAL NEUROPATHY (H): ICD-10-CM

## 2019-03-25 DIAGNOSIS — E66.01 MORBID OBESITY (H): Primary | ICD-10-CM

## 2019-03-25 DIAGNOSIS — Z48.298 AFTERCARE FOLLOWING ORGAN TRANSPLANT: Primary | ICD-10-CM

## 2019-03-25 DIAGNOSIS — Z29.89 NEED FOR PNEUMOCYSTIS PROPHYLAXIS: ICD-10-CM

## 2019-03-25 DIAGNOSIS — E66.01 MORBID OBESITY (H): ICD-10-CM

## 2019-03-25 DIAGNOSIS — Z48.298 AFTERCARE FOLLOWING ORGAN TRANSPLANT: ICD-10-CM

## 2019-03-25 DIAGNOSIS — Z94.0 DECEASED-DONOR KIDNEY TRANSPLANT: ICD-10-CM

## 2019-03-25 LAB
ANION GAP SERPL CALCULATED.3IONS-SCNC: 10 MMOL/L (ref 3–14)
BUN SERPL-MCNC: 20 MG/DL (ref 7–30)
CALCIUM SERPL-MCNC: 9.2 MG/DL (ref 8.5–10.1)
CHLORIDE SERPL-SCNC: 107 MMOL/L (ref 94–109)
CO2 SERPL-SCNC: 23 MMOL/L (ref 20–32)
CREAT SERPL-MCNC: 1.01 MG/DL (ref 0.52–1.04)
ERYTHROCYTE [DISTWIDTH] IN BLOOD BY AUTOMATED COUNT: 15.9 % (ref 10–15)
GFR SERPL CREATININE-BSD FRML MDRD: 60 ML/MIN/{1.73_M2}
GLUCOSE SERPL-MCNC: 166 MG/DL (ref 70–99)
HCT VFR BLD AUTO: 38.2 % (ref 35–47)
HGB BLD-MCNC: 11.7 G/DL (ref 11.7–15.7)
MCH RBC QN AUTO: 25.1 PG (ref 26.5–33)
MCHC RBC AUTO-ENTMCNC: 30.6 G/DL (ref 31.5–36.5)
MCV RBC AUTO: 82 FL (ref 78–100)
PLATELET # BLD AUTO: 191 10E9/L (ref 150–450)
POTASSIUM SERPL-SCNC: 3.6 MMOL/L (ref 3.4–5.3)
RBC # BLD AUTO: 4.67 10E12/L (ref 3.8–5.2)
SODIUM SERPL-SCNC: 139 MMOL/L (ref 133–144)
WBC # BLD AUTO: 3.8 10E9/L (ref 4–11)

## 2019-03-25 PROCEDURE — 80201 ASSAY OF TOPIRAMATE: CPT | Performed by: INTERNAL MEDICINE

## 2019-03-25 PROCEDURE — 85027 COMPLETE CBC AUTOMATED: CPT | Performed by: INTERNAL MEDICINE

## 2019-03-25 PROCEDURE — G0463 HOSPITAL OUTPT CLINIC VISIT: HCPCS | Mod: ZF

## 2019-03-25 PROCEDURE — 80048 BASIC METABOLIC PNL TOTAL CA: CPT | Performed by: INTERNAL MEDICINE

## 2019-03-25 PROCEDURE — 36415 COLL VENOUS BLD VENIPUNCTURE: CPT | Performed by: INTERNAL MEDICINE

## 2019-03-25 RX ORDER — NALTREXONE HYDROCHLORIDE 50 MG/1
TABLET, FILM COATED ORAL
Qty: 30 TABLET | Refills: 5 | Status: SHIPPED | OUTPATIENT
Start: 2019-03-25 | End: 2019-05-02

## 2019-03-25 ASSESSMENT — MIFFLIN-ST. JEOR: SCORE: 1181.27

## 2019-03-25 ASSESSMENT — PAIN SCALES - GENERAL: PAINLEVEL: MILD PAIN (3)

## 2019-03-25 NOTE — PATIENT INSTRUCTIONS
Please check the PRA antibody test with you next lab draw -- in addition to regular transplant labs (BMP, CBC, CSA level)

## 2019-03-25 NOTE — LETTER
"3/25/2019       RE: Elizabeth Palma  [address]  [facility] [address]     Dear Colleague,    Thank you for referring your patient, Elizabeth Palma, to the [facility] MEDICAL WEIGHT MANAGEMENT at [facility]. Please see a copy of my visit note below.        Return Medical Weight Management Note     Elizabeth Palma  MRN:  [id number]  :  1955  AYSE:  3/25/2019    Dear Dr. Sheridan,      I had the pleasure of seeing your patient Elizabeth Palma.  She is a 63 year old female who I am continuing to see for treatment of obesity related to: DM-2, Hyperlipidemia, Sleep Apnea (has CPAP and does not use), GERD and Depression. S/p gastric bypass surgery in 1990.     CURRENT WEIGHT:   149 lbs 9.6 oz    Wt Readings from Last 4 Encounters:   19 67.9 kg (149 lb 9.6 oz)   19 67.9 kg (149 lb 12.8 oz)   19 66.7 kg (147 lb)   19 66.9 kg (147 lb 8 oz)     Body Mass Index:  Body mass index is 28.25 kg/m .  Vitals:  /75   Pulse 77   Ht 1.55 m (5' 1.02\")   Wt 67.9 kg (149 lb 9.6 oz)   LMP  (LMP Unknown)   SpO2 97%   BMI 28.25 kg/m       Initial consult weight was 214 on 2015.  Weight change since last seen on 2018 is up 2 lbs.   Total loss is 65 pounds.    INTERVAL HISTORY:  Topiramate 200 AM and 200 HS along with off gabapentin, and pain is ok. Reports she has been struggling with frequent cravings for salty snacks like crackers, chips, and pretzels throughout the day, but primarily at night. She doesn't think the topiramate is helping to decrease her appetite anymore. Denies any side effects. She feels full quickly ever since gastric bypass surgery in .      Breakfast - 1/2 bagel or piece of toast for breakfast   Lunch - 1/2 sandwich or goes out for vegetarian taco  Snacks most of the time - crackers, pretzels, vegetables, fruit, sunflower seeds   Dinner - varies, tries to eat vegetables    Has seen nutritionist " who has recommended she begin getting consistent exercise. She has struggled with this due to chronic joint pain and fatigue.      Diet and Activity Changes Since Last Visit Reviewed With Patient 3/25/2019   I have made the following changes to my diet since my last visit: none   With regards to my diet, I am still struggling with: hunge   For breakfast, I typically eat: -   For lunch, I typically eat: -   For supper, I typically eat: -   For snack(s), I typically eat: -   I have made the following changes to my activity/exercise since my last visit: none   With regards to my activity/exercise, I am still struggling with: need more     MEDICATIONS:   Current Outpatient Medications   Medication     acetaminophen (TYLENOL) 325 MG tablet     acetylcysteine (N-ACETYL-L-CYSTEINE) 600 MG CAPS capsule     albuterol (PROAIR HFA/PROVENTIL HFA/VENTOLIN HFA) 108 (90 Base) MCG/ACT inhaler     ARIPiprazole (ABILIFY) 2 MG tablet     aspirin EC 81 MG EC tablet     blood glucose monitoring (ACCU-CHEK RALPH PLUS) meter device kit     blood glucose monitoring (NO BRAND SPECIFIED) meter device kit     blood glucose monitoring (NO BRAND SPECIFIED) test strip     blood glucose monitoring (SOFTCLIX) lancets     Cholecalciferol (VITAMIN D) 1000 UNITS capsule     cyanocolbalamin (VITAMIN  B-12) 1000 MCG tablet     cycloSPORINE modified (GENERIC EQUIVALENT) 25 MG capsule     cycloSPORINE modified (GENERIC EQUIVALENT) 25 MG capsule     econazole nitrate 1 % cream     fluticasone (FLONASE) 50 MCG/ACT nasal spray     furosemide (LASIX) 20 MG tablet     latanoprost (XALATAN) 0.005 % ophthalmic solution     metFORMIN (GLUCOPHAGE-XR) 500 MG 24 hr tablet     mycophenolate (GENERIC EQUIVALENT) 250 MG capsule     omeprazole (PRILOSEC) 40 MG capsule     ondansetron (ZOFRAN-ODT) 4 MG ODT tab     order for DME     Polyvinyl Alcohol-Povidone (REFRESH OP)     prazosin (MINIPRESS) 5 MG capsule     simvastatin (ZOCOR) 20 MG tablet      sulfamethoxazole-trimethoprim (BACTRIM/SEPTRA) 400-80 MG per tablet     topiramate (TOPAMAX) 200 MG tablet     Vaginal Lubricant (REPLENS) GEL     vilazodone (VIIBRYD) 40 MG TABS tablet     Furosemide (LASIX) 20 MG tablet     No current facility-administered medications for this visit.        Weight Loss Medication History Reviewed With Patient 3/25/2019   Which weight loss medications are you currently taking on a regular basis?  Topamax (topiramate)   Are you having any side effects from the weight loss medication that we have prescribed you? No   If you are having side effects please describe: -     ASSESSMENT:   Maintaining weight, has overall made good progress. Given her desire to lose another 10-15 lbs and her frequent snacking, we will try naltrexone 25 mg daily to curb cravings. Will check topiramate level today to see if there is room to increase this, although she is currently at the maximum recommended dose. For now will maintain topiramate 200 morning and evening. Advised she remove snacks from the home. Remains off gabapentin with acceptable neuropathy pain control.     FOLLOW-UP:    3-4 months  10/15 minutes spent on counseling and education    Note scribed for Dr. Irene by Jes Sorenson MS3.     Attending physician addendum.   Pt was seen and evaluated with the medical student. Clinical data was reviewed and discussed with the patient and with the student. The note above reflects our history and findings, and the evaluation and plan reflects my clinical opinion.   10 of 15 minutes were spent in counseling, education, and discussion related to the above issues.     Again, thank you for allowing me to participate in the care of your patient.      Sincerely,    Prakash Irene MD

## 2019-03-25 NOTE — LETTER
3/25/2019      RE: Elizabeth Palma  [address]  [facility] [address]       [facility]  Nephrology Clinic  Kidney/Pancreas Recipient  2019     Name: Elizabeth Palma  MRN: [id number]   Age: 63 year old  : 1955  Referring provider: Horacio Sheridan     CHRONIC TRANSPLANT NEPHROLOGY VISIT    Assessment and Plan:   # DDKT:    - Baseline Cr ~ 0.9-1.0; Stable   - Proteinuria: Normal   - Date of DSA last checked: 3/2016 Latest DSA: No   - BK Viremia: No   - Kidney Tx Biopsy: Yes 2014 - [facility]    # Immunosuppression: Cyclosporine (goal  ) and Mycophenolate mofetil (goal  1-3.5)   - Changes: No    # Prophylaxis:    - PJP on Bactrim.     # Hypertension: Controlled; Goal BP: < 140/90   - Changes: No    # Diabetes: Borderline control    # Mineral Bone Disorder:    - Secondary renal hyperparathyroidism; PTH level is: Mildly elevated   - Vitamin D; level is: Not checked recently. Continue on Vitamin D supplementation.    - Calcium; level is: Normal   - Phosphorus; level is: Not checked recently      # Electrolytes:   - Potassium; level: Normal  - Magnesium; level: Not checked recently  - Bicarbonate; level: Normal    Follow-up: Data Unavailable     # Transplant History:  Etiology of kidney failure: polycystic kidney disease (PKD)  Tx: DDKT  Transplant: 3/20/2014 (Kidney)  Donor Type:  - Brain Death Donor Class: Standard Criteria Donor  Significant changes in immunosuppression: None  Significant transplant-related complications: None    Transplant Office Phone Number: [phone]    Assessment and plan was discussed with the patient and they voiced understanding and agreement.    Chief Complaint   Follow-up    History of Present Illness:  Elizabeth Palma is a 63 year old female with a history of ESKD secondary to Polycystic Kidney Disease, is status-post DDKT completed on 3/20/2014 who presents for follow-up.    The patient was last evaluated on 2018 by Dr. Flores. At that time, no  changes were made to her immunosuppression. Please see note for further details. The patient was hospitalized last August with mental illness concerns. She wasn't suicidal or depressed but was not eating well. Says she is doing quite well now and follows up with her therapist regularly. Is not suicidal and says her condition is under control and improving.      Today, the patient says she is doing well. Endorses frequent nausea and vomiting 1-2 times per week in the mornings and evenings after she takes her medication. Is unable to point to a medication that causes this. Says her medications make her nauseous but denies associated heartburn. Symptoms usually disappear within 30 minutes. Otherwise, she is actively trying to lose more weight and is following up with weight loss clinic. Doesn't check her blood pressure at home, but says that it's usually 120/70 when she visits other doctors. Denies leg swelling. No diarrhea, fever, sweats, or chills. Of note, the patient has a UTI last October. Symptoms are completely resolved.      Recent Hospitalizations:  [] No [x] Yes Last August. In ED for depressive symptoms.    New Medical Issues: [x] No [] Yes    Decreased energy: [x] No [] Yes    Chest pain or SOB with exertion:  [x] No [] Yes    Appetite change or weight change: [x] No [] Yes    Nausea, vomiting or diarrhea:  [] No [x] Yes Nausea and vomiting. No diarrhea.    Fever, sweats or chills: [x] No [] Yes    Leg swelling: [x] No [] Yes      Other medical issues:  No    Home BP: Not checked     Review of Systems:   A comprehensive review of systems was obtained and negative, except as noted in the HPI or past medical history.     Active Medications:     Current Outpatient Medications:      acetaminophen (TYLENOL) 325 MG tablet, Take 325-650 mg by mouth as needed, Disp: , Rfl:      acetylcysteine (N-ACETYL-L-CYSTEINE) 600 MG CAPS capsule, Take 2 400 mg caps two times daily for total daily dose of 800 mg, Disp: 30  capsule, Rfl:      albuterol (PROAIR HFA/PROVENTIL HFA/VENTOLIN HFA) 108 (90 Base) MCG/ACT inhaler, Inhale 2 puffs into the lungs every 6 hours as needed for shortness of breath / dyspnea or wheezing, Disp: 1 Inhaler, Rfl: 5     ARIPiprazole (ABILIFY) 2 MG tablet, Take 1 tablet (2 mg) by mouth daily, Disp: 30 tablet, Rfl: 3     aspirin EC 81 MG EC tablet, Take 1 tablet (81 mg) by mouth daily, Disp: 30 tablet, Rfl: 5     blood glucose monitoring (ACCU-CHEK RALPH PLUS) meter device kit, , Disp: , Rfl: 0     blood glucose monitoring (NO BRAND SPECIFIED) meter device kit, Use to test blood sugar 2 times daily or as directed., Disp: 1 kit, Rfl: 0     blood glucose monitoring (NO BRAND SPECIFIED) test strip, Use to test blood sugars 2 times daily or as directed, Disp: 100 strip, Rfl: 11     blood glucose monitoring (SOFTCLIX) lancets, Use to test blood sugar 2 times daily or as directed., Disp: 1 Box, Rfl: 11     Cholecalciferol (VITAMIN D) 1000 UNITS capsule, 2,000 Units , Disp: 30 capsule, Rfl:      cyanocolbalamin (VITAMIN  B-12) 1000 MCG tablet, Take 1 tablet by mouth daily. (Patient taking differently: Take 1,000 mcg by mouth every other day ), Disp: 30 tablet, Rfl: 0     cycloSPORINE modified (GENERIC EQUIVALENT) 25 MG capsule, TAKE 5 CAPSULES (125MG) BY MOUTH TWO TIMES A DAY, Disp: 300 capsule, Rfl: 6     cycloSPORINE modified (GENERIC EQUIVALENT) 25 MG capsule, Take 5 capsules (125 mg) by mouth 2 times daily, Disp: 300 capsule, Rfl: 6     econazole nitrate 1 % cream, Apply topically daily, Disp: 85 g, Rfl: 3     fluticasone (FLONASE) 50 MCG/ACT nasal spray, Spray 1 spray into both nostrils daily *SHAKE LIQUID GENTLY, Disp: 16 mL, Rfl: 2     furosemide (LASIX) 20 MG tablet, Take 1 tablet (20 mg) by mouth daily, Disp: 90 tablet, Rfl: 3     Furosemide (LASIX) 20 MG tablet, Take 1 tablet (20 mg) by mouth daily, Disp: 30 tablet, Rfl: 0     latanoprost (XALATAN) 0.005 % ophthalmic solution, Place 1 drop into both eyes  At Bedtime, Disp: 7.5 mL, Rfl: 2     metFORMIN (GLUCOPHAGE-XR) 500 MG 24 hr tablet, Take 3 tablets (1,500 mg) by mouth daily (with dinner), Disp: 270 tablet, Rfl: 1     mycophenolate (GENERIC EQUIVALENT) 250 MG capsule, Take 4 capsules (1,000 mg) by mouth 2 times daily, Disp: 240 capsule, Rfl: 11     naltrexone (DEPADE/REVIA) 50 MG tablet, Take 1/2 tablet.  Time it one to two hours prior to worst cravings.  Then increase to one full tablet as instructed., Disp: 30 tablet, Rfl: 5     omeprazole (PRILOSEC) 40 MG capsule, Take 1 capsule (40 mg) by mouth daily, Disp: 90 capsule, Rfl: 3     ondansetron (ZOFRAN-ODT) 4 MG ODT tab, Take 1 tablet (4 mg) by mouth every 6 hours as needed, Disp: 20 tablet, Rfl: 3     order for DME, Walker with front wheels and a seat., Disp: 1 Units, Rfl: 0     Polyvinyl Alcohol-Povidone (REFRESH OP), Apply to eye as needed Both eyes, Disp: , Rfl:      prazosin (MINIPRESS) 5 MG capsule, Take 3 capsules (15 mg) by mouth At Bedtime, Disp: 90 capsule, Rfl: 3     simvastatin (ZOCOR) 20 MG tablet, Take 1 tablet (20 mg) by mouth At Bedtime, Disp: 90 tablet, Rfl: 3     sulfamethoxazole-trimethoprim (BACTRIM/SEPTRA) 400-80 MG per tablet, Take 1 tablet by mouth daily, Disp: 90 tablet, Rfl: 3     topiramate (TOPAMAX) 200 MG tablet, Take 1 tablet (200 mg) by mouth 2 times daily, Disp: 60 tablet, Rfl: 5     Vaginal Lubricant (REPLENS) GEL, Use vaginally as needed. Can use up to 3 times per week., Disp: 35 g, Rfl: 11     vilazodone (VIIBRYD) 40 MG TABS tablet, Take 1 tablet (40 mg) by mouth daily, Disp: 30 tablet, Rfl: 3      Allergies:   Percocet [oxycodone-acetaminophen] and Novocain [procaine hcl]      Active Medical Problems:  Patient Active Problem List   Diagnosis     Autosomal dominant polycystic kidney disease     Hypertension     Hyperlipidemia     Abnormal MRI, cervical spine     Sensory loss     Premature menopause age 37     OP (osteoporosis) T score -3.8     LION on CPAP     Major depressive  disorder, recurrent episode, moderate (H)     Generalized anxiety disorder     CMC DJD(carpometacarpal degenerative joint disease), localized primary     Pain in joint, forearm -- L unhealed Fx     -donor kidney transplant     Immunosuppressed status (H)     Hyperparathyroidism, secondary (H)     Hyperparathyroidism (H)     Senile osteoporosis     Pain in joint involving ankle and foot     Nightmares associated with chronic post-traumatic stress disorder     Type 2 diabetes mellitus with peripheral neuropathy (H)     Posttraumatic stress disorder     Right knee pain     Left knee pain     Age-related osteoporosis without current pathological fracture     Narcissistic personality disorder (H)     Personal history of other drug therapy     Median sensory neuropathy, left        Social History:   Social History     Tobacco Use     Smoking status: Former Smoker     Smokeless tobacco: Never Used   Substance Use Topics     Alcohol use: No     Alcohol/week: 0.0 oz     Drug use: No       Physical Exam:   LMP  (LMP Unknown)    Wt Readings from Last 4 Encounters:   19 67.9 kg (149 lb 9.6 oz)   19 67.9 kg (149 lb 12.8 oz)   19 66.7 kg (147 lb)   19 66.9 kg (147 lb 8 oz)     GENERAL APPEARANCE: alert and no distress  HENT: mouth without ulcers or lesions  LYMPHATICS: no cervical or supraclavicular nodes  RESP: lungs clear to auscultation - no rales, rhonchi or wheezes  CV: regular rhythm, normal rate, no rub, no murmur  EDEMA: no LE edema bilaterally  ABDOMEN: soft, nondistended, nontender, bowel sounds normal  MS: extremities normal - no gross deformities noted, no evidence of inflammation in joints, no muscle tenderness  SKIN: no rash    Data:   Renal Latest Ref Rng & Units 3/25/2019 2019 2019   Na 133 - 144 mmol/L 139 142 139   K 3.4 - 5.3 mmol/L 3.6 4.0 3.7   Cl 94 - 109 mmol/L 107 111(H) 110(H)   CO2 20 - 32 mmol/L 23 22 22   BUN 7 - 30 mg/dL 20 16 16   Cr 0.52 - 1.04 mg/dL 1.01  0.90 0.92   Glucose 70 - 99 mg/dL 166(H) 151(H) 144(H)   Ca  8.5 - 10.1 mg/dL 9.2 8.5 8.6   Mg 1.6 - 2.3 mg/dL - - -     Bone Health Latest Ref Rng & Units 10/30/2018 5/19/2017 4/19/2016   Phos 2.5 - 4.5 mg/dL 3.6 3.2 -   PTHi 18 - 80 pg/mL 85(H) 76(H) 107(H)   Vit D Def 20 - 75 ug/L - - 40     Heme Latest Ref Rng & Units 3/25/2019 2/26/2019 1/22/2019   WBC 4.0 - 11.0 10e9/L 3.8(L) 3.2(L) 4.3   Hgb 11.7 - 15.7 g/dL 11.7 11.1(L) 12.1   Plt 150 - 450 10e9/L 191 202 202     Liver Latest Ref Rng & Units 10/30/2018 5/19/2017 6/2/2015   AP 40 - 150 U/L - - 123   TBili 0.2 - 1.3 mg/dL - - 1.1   DBili 0.0 - 0.2 mg/dL - - -   ALT 0 - 50 U/L - - 19   AST 0 - 45 U/L - - 13   Tot Protein 6.8 - 8.8 g/dL - - 6.8   Albumin 3.4 - 5.0 g/dL 3.8 3.7 3.9     Pancreas Latest Ref Rng & Units 11/27/2018 1/19/2018 5/19/2017   A1C 0 - 5.6 % 7.4(H) 6.4(H) 6.5(H)   A1C (POC) 4.3 - 6 % - - -   Lipase 20 - 250 U/L - - -     Iron studies Latest Ref Rng & Units 6/2/2015 9/16/2014 5/19/2014   Iron 35 - 180 ug/dL - 57 30(L)   Iron sat 15 - 46 % - 14(L) 10(L)   Ferritin 8 - 252 ng/mL 10 22 47     [facility] Txp Virology Latest Ref Rng & Units 1/22/2019 8/28/2018 3/12/2018   CMV IgG EU/mL - - -   BK Spec - Plasma 08/28/2018 21:00 Plasma   BK Res BKNEG:BK Virus DNA Not Detected copies/mL BK Virus DNA Not Detected BK Virus DNA Not Detected BK Virus DNA Not Detected   BK Log <2.7 Log copies/mL Not Calculated Not Calculated Not Calculated   EBV IgG - - - -   Hep B Core NEG - - -   Hep B Surf - - - -   HIV 1&2 NEG - - -        Recent Labs   Lab Test 07/16/14  0917 07/22/14  0909 06/02/15  0848   DOSTAC 7/15/14  2100 7/21/14 AT 2100 6/1/15 2100   TACROL 8.4 10.9 Test canceled - Lab  error     Recent Labs   Lab Test 06/02/14  0914 06/05/14  0915 06/09/14  0949   DOSMPA 6/1/14 2100 06/04/14  21:00PM 2100   MPACID <0.25* 3.45 4.00*   MPAG 63.4 62.2 73.1     Scribe Disclosure:   I, Donovan Cedillo, am serving as a scribe to document services personally  performed by Jarvis Flores at this visit, based upon the provider's statements to me. All documentation has been reviewed by the aforementioned provider prior to being entered into the official medical record.     Portions of this medical record were completed by a scribe. UPON MY REVIEW AND AUTHENTICATION BY ELECTRONIC SIGNATURE, this confirms (a) I performed the applicable clinical services, and (b) the record is accurate.       Kidney/Pancreas Recipient

## 2019-03-25 NOTE — NURSING NOTE
"No chief complaint on file.    Vital signs:  Temp: 98.4  F (36.9  C) Temp src: Oral BP: 137/85 Pulse: 58     SpO2: 99 %       Weight: 67.8 kg (149 lb 6.4 oz)  Estimated body mass index is 28.21 kg/m  as calculated from the following:    Height as of an earlier encounter on 3/25/19: 1.55 m (5' 1.02\").    Weight as of this encounter: 67.8 kg (149 lb 6.4 oz).        Celeste Watts    "

## 2019-03-25 NOTE — PROGRESS NOTES
[facility]  Nephrology Clinic  Kidney/Pancreas Recipient  2019     Name: Elizabeth Palma  MRN: [id number]   Age: 63 year old  : 1955  Referring provider: Horacio Sheridan     CHRONIC TRANSPLANT NEPHROLOGY VISIT    Assessment and Plan:   # DDKT:    - Baseline Cr ~ 0.9-1.0; Stable   - Proteinuria: Normal   - Date of DSA last checked: 3/2016 Latest DSA: No   - BK Viremia: No   - Kidney Tx Biopsy: Yes 2014 - [facility]    # Immunosuppression: Cyclosporine (goal  ) and Mycophenolate mofetil (goal  1-3.5)   - Changes: No    # Prophylaxis:    - PJP on Bactrim.     # Hypertension: Controlled; Goal BP: < 140/90   - Changes: No    # Diabetes: Borderline control    # Mineral Bone Disorder:    - Secondary renal hyperparathyroidism; PTH level is: Mildly elevated   - Vitamin D; level is: Not checked recently. Continue on Vitamin D supplementation.    - Calcium; level is: Normal   - Phosphorus; level is: Not checked recently      # Electrolytes:   - Potassium; level: Normal  - Magnesium; level: Not checked recently  - Bicarbonate; level: Normal    Follow-up: Data Unavailable     # Transplant History:  Etiology of kidney failure: polycystic kidney disease (PKD)  Tx: DDKT  Transplant: 3/20/2014 (Kidney)  Donor Type:  - Brain Death Donor Class: Standard Criteria Donor  Significant changes in immunosuppression: None  Significant transplant-related complications: None    Transplant Office Phone Number: [phone]    Assessment and plan was discussed with the patient and they voiced understanding and agreement.    Chief Complaint   Follow-up    History of Present Illness:  Elizabeth Palma is a 63 year old female with a history of ESKD secondary to Polycystic Kidney Disease, is status-post DDKT completed on 3/20/2014 who presents for follow-up.    The patient was last evaluated on 2018 by Dr. Flores. At that time, no changes were made to her immunosuppression. Please see note for further details. The patient was  hospitalized last August with mental illness concerns. She wasn't suicidal or depressed but was not eating well. Says she is doing quite well now and follows up with her therapist regularly. Is not suicidal and says her condition is under control and improving.      Today, the patient says she is doing well. Endorses frequent nausea and vomiting 1-2 times per week in the mornings and evenings after she takes her medication. Is unable to point to a medication that causes this. Says her medications make her nauseous but denies associated heartburn. Symptoms usually disappear within 30 minutes. Otherwise, she is actively trying to lose more weight and is following up with weight loss clinic. Doesn't check her blood pressure at home, but says that it's usually 120/70 when she visits other doctors. Denies leg swelling. No diarrhea, fever, sweats, or chills. Of note, the patient has a UTI last October. Symptoms are completely resolved.      Recent Hospitalizations:  [] No [x] Yes Last August. In ED for depressive symptoms.    New Medical Issues: [x] No [] Yes    Decreased energy: [x] No [] Yes    Chest pain or SOB with exertion:  [x] No [] Yes    Appetite change or weight change: [x] No [] Yes    Nausea, vomiting or diarrhea:  [] No [x] Yes Nausea and vomiting. No diarrhea.    Fever, sweats or chills: [x] No [] Yes    Leg swelling: [x] No [] Yes      Other medical issues:  No    Home BP: Not checked     Review of Systems:   A comprehensive review of systems was obtained and negative, except as noted in the HPI or past medical history.     Active Medications:     Current Outpatient Medications:      acetaminophen (TYLENOL) 325 MG tablet, Take 325-650 mg by mouth as needed, Disp: , Rfl:      acetylcysteine (N-ACETYL-L-CYSTEINE) 600 MG CAPS capsule, Take 2 400 mg caps two times daily for total daily dose of 800 mg, Disp: 30 capsule, Rfl:      albuterol (PROAIR HFA/PROVENTIL HFA/VENTOLIN HFA) 108 (90 Base) MCG/ACT inhaler,  Inhale 2 puffs into the lungs every 6 hours as needed for shortness of breath / dyspnea or wheezing, Disp: 1 Inhaler, Rfl: 5     ARIPiprazole (ABILIFY) 2 MG tablet, Take 1 tablet (2 mg) by mouth daily, Disp: 30 tablet, Rfl: 3     aspirin EC 81 MG EC tablet, Take 1 tablet (81 mg) by mouth daily, Disp: 30 tablet, Rfl: 5     blood glucose monitoring (ACCU-CHEK RALPH PLUS) meter device kit, , Disp: , Rfl: 0     blood glucose monitoring (NO BRAND SPECIFIED) meter device kit, Use to test blood sugar 2 times daily or as directed., Disp: 1 kit, Rfl: 0     blood glucose monitoring (NO BRAND SPECIFIED) test strip, Use to test blood sugars 2 times daily or as directed, Disp: 100 strip, Rfl: 11     blood glucose monitoring (SOFTCLIX) lancets, Use to test blood sugar 2 times daily or as directed., Disp: 1 Box, Rfl: 11     Cholecalciferol (VITAMIN D) 1000 UNITS capsule, 2,000 Units , Disp: 30 capsule, Rfl:      cyanocolbalamin (VITAMIN  B-12) 1000 MCG tablet, Take 1 tablet by mouth daily. (Patient taking differently: Take 1,000 mcg by mouth every other day ), Disp: 30 tablet, Rfl: 0     cycloSPORINE modified (GENERIC EQUIVALENT) 25 MG capsule, TAKE 5 CAPSULES (125MG) BY MOUTH TWO TIMES A DAY, Disp: 300 capsule, Rfl: 6     cycloSPORINE modified (GENERIC EQUIVALENT) 25 MG capsule, Take 5 capsules (125 mg) by mouth 2 times daily, Disp: 300 capsule, Rfl: 6     econazole nitrate 1 % cream, Apply topically daily, Disp: 85 g, Rfl: 3     fluticasone (FLONASE) 50 MCG/ACT nasal spray, Spray 1 spray into both nostrils daily *SHAKE LIQUID GENTLY, Disp: 16 mL, Rfl: 2     furosemide (LASIX) 20 MG tablet, Take 1 tablet (20 mg) by mouth daily, Disp: 90 tablet, Rfl: 3     Furosemide (LASIX) 20 MG tablet, Take 1 tablet (20 mg) by mouth daily, Disp: 30 tablet, Rfl: 0     latanoprost (XALATAN) 0.005 % ophthalmic solution, Place 1 drop into both eyes At Bedtime, Disp: 7.5 mL, Rfl: 2     metFORMIN (GLUCOPHAGE-XR) 500 MG 24 hr tablet, Take 3 tablets  (1,500 mg) by mouth daily (with dinner), Disp: 270 tablet, Rfl: 1     mycophenolate (GENERIC EQUIVALENT) 250 MG capsule, Take 4 capsules (1,000 mg) by mouth 2 times daily, Disp: 240 capsule, Rfl: 11     naltrexone (DEPADE/REVIA) 50 MG tablet, Take 1/2 tablet.  Time it one to two hours prior to worst cravings.  Then increase to one full tablet as instructed., Disp: 30 tablet, Rfl: 5     omeprazole (PRILOSEC) 40 MG capsule, Take 1 capsule (40 mg) by mouth daily, Disp: 90 capsule, Rfl: 3     ondansetron (ZOFRAN-ODT) 4 MG ODT tab, Take 1 tablet (4 mg) by mouth every 6 hours as needed, Disp: 20 tablet, Rfl: 3     order for DME, Walker with front wheels and a seat., Disp: 1 Units, Rfl: 0     Polyvinyl Alcohol-Povidone (REFRESH OP), Apply to eye as needed Both eyes, Disp: , Rfl:      prazosin (MINIPRESS) 5 MG capsule, Take 3 capsules (15 mg) by mouth At Bedtime, Disp: 90 capsule, Rfl: 3     simvastatin (ZOCOR) 20 MG tablet, Take 1 tablet (20 mg) by mouth At Bedtime, Disp: 90 tablet, Rfl: 3     sulfamethoxazole-trimethoprim (BACTRIM/SEPTRA) 400-80 MG per tablet, Take 1 tablet by mouth daily, Disp: 90 tablet, Rfl: 3     topiramate (TOPAMAX) 200 MG tablet, Take 1 tablet (200 mg) by mouth 2 times daily, Disp: 60 tablet, Rfl: 5     Vaginal Lubricant (REPLENS) GEL, Use vaginally as needed. Can use up to 3 times per week., Disp: 35 g, Rfl: 11     vilazodone (VIIBRYD) 40 MG TABS tablet, Take 1 tablet (40 mg) by mouth daily, Disp: 30 tablet, Rfl: 3      Allergies:   Percocet [oxycodone-acetaminophen] and Novocain [procaine hcl]      Active Medical Problems:  Patient Active Problem List   Diagnosis     Autosomal dominant polycystic kidney disease     Hypertension     Hyperlipidemia     Abnormal MRI, cervical spine     Sensory loss     Premature menopause age 37     OP (osteoporosis) T score -3.8     LION on CPAP     Major depressive disorder, recurrent episode, moderate (H)     Generalized anxiety disorder     CMC DJD(carpometacarpal  degenerative joint disease), localized primary     Pain in joint, forearm -- L unhealed Fx     -donor kidney transplant     Immunosuppressed status (H)     Hyperparathyroidism, secondary (H)     Hyperparathyroidism (H)     Senile osteoporosis     Pain in joint involving ankle and foot     Nightmares associated with chronic post-traumatic stress disorder     Type 2 diabetes mellitus with peripheral neuropathy (H)     Posttraumatic stress disorder     Right knee pain     Left knee pain     Age-related osteoporosis without current pathological fracture     Narcissistic personality disorder (H)     Personal history of other drug therapy     Median sensory neuropathy, left        Social History:   Social History     Tobacco Use     Smoking status: Former Smoker     Smokeless tobacco: Never Used   Substance Use Topics     Alcohol use: No     Alcohol/week: 0.0 oz     Drug use: No       Physical Exam:   LMP  (LMP Unknown)    Wt Readings from Last 4 Encounters:   19 67.9 kg (149 lb 9.6 oz)   19 67.9 kg (149 lb 12.8 oz)   19 66.7 kg (147 lb)   19 66.9 kg (147 lb 8 oz)     GENERAL APPEARANCE: alert and no distress  HENT: mouth without ulcers or lesions  LYMPHATICS: no cervical or supraclavicular nodes  RESP: lungs clear to auscultation - no rales, rhonchi or wheezes  CV: regular rhythm, normal rate, no rub, no murmur  EDEMA: no LE edema bilaterally  ABDOMEN: soft, nondistended, nontender, bowel sounds normal  MS: extremities normal - no gross deformities noted, no evidence of inflammation in joints, no muscle tenderness  SKIN: no rash    Data:   Renal Latest Ref Rng & Units 3/25/2019 2019 2019   Na 133 - 144 mmol/L 139 142 139   K 3.4 - 5.3 mmol/L 3.6 4.0 3.7   Cl 94 - 109 mmol/L 107 111(H) 110(H)   CO2 20 - 32 mmol/L 23 22 22   BUN 7 - 30 mg/dL 20 16 16   Cr 0.52 - 1.04 mg/dL 1.01 0.90 0.92   Glucose 70 - 99 mg/dL 166(H) 151(H) 144(H)   Ca  8.5 - 10.1 mg/dL 9.2 8.5 8.6   Mg 1.6 - 2.3  mg/dL - - -     Bone Health Latest Ref Rng & Units 10/30/2018 5/19/2017 4/19/2016   Phos 2.5 - 4.5 mg/dL 3.6 3.2 -   PTHi 18 - 80 pg/mL 85(H) 76(H) 107(H)   Vit D Def 20 - 75 ug/L - - 40     Heme Latest Ref Rng & Units 3/25/2019 2/26/2019 1/22/2019   WBC 4.0 - 11.0 10e9/L 3.8(L) 3.2(L) 4.3   Hgb 11.7 - 15.7 g/dL 11.7 11.1(L) 12.1   Plt 150 - 450 10e9/L 191 202 202     Liver Latest Ref Rng & Units 10/30/2018 5/19/2017 6/2/2015   AP 40 - 150 U/L - - 123   TBili 0.2 - 1.3 mg/dL - - 1.1   DBili 0.0 - 0.2 mg/dL - - -   ALT 0 - 50 U/L - - 19   AST 0 - 45 U/L - - 13   Tot Protein 6.8 - 8.8 g/dL - - 6.8   Albumin 3.4 - 5.0 g/dL 3.8 3.7 3.9     Pancreas Latest Ref Rng & Units 11/27/2018 1/19/2018 5/19/2017   A1C 0 - 5.6 % 7.4(H) 6.4(H) 6.5(H)   A1C (POC) 4.3 - 6 % - - -   Lipase 20 - 250 U/L - - -     Iron studies Latest Ref Rng & Units 6/2/2015 9/16/2014 5/19/2014   Iron 35 - 180 ug/dL - 57 30(L)   Iron sat 15 - 46 % - 14(L) 10(L)   Ferritin 8 - 252 ng/mL 10 22 47     [facility] Txp Virology Latest Ref Rng & Units 1/22/2019 8/28/2018 3/12/2018   CMV IgG EU/mL - - -   BK Spec - Plasma 08/28/2018 21:00 Plasma   BK Res BKNEG:BK Virus DNA Not Detected copies/mL BK Virus DNA Not Detected BK Virus DNA Not Detected BK Virus DNA Not Detected   BK Log <2.7 Log copies/mL Not Calculated Not Calculated Not Calculated   EBV IgG - - - -   Hep B Core NEG - - -   Hep B Surf - - - -   HIV 1&2 NEG - - -        Recent Labs   Lab Test 07/16/14  0917 07/22/14  0909 06/02/15  0848   DOSTAC 7/15/14  2100 7/21/14 AT 2100 6/1/15 2100   TACROL 8.4 10.9 Test canceled - Lab  error     Recent Labs   Lab Test 06/02/14  0914 06/05/14  0915 06/09/14  0949   DOSMPA 6/1/14 2100 06/04/14  21:00PM 2100   MPACID <0.25* 3.45 4.00*   MPAG 63.4 62.2 73.1     Scribe Disclosure:   I, Donovan Ceidllo, am serving as a scribe to document services personally performed by Jarvis Flores at this visit, based upon the provider's statements to me. All documentation has been  reviewed by the aforementioned provider prior to being entered into the official medical record.     Portions of this medical record were completed by a scribe. UPON MY REVIEW AND AUTHENTICATION BY ELECTRONIC SIGNATURE, this confirms (a) I performed the applicable clinical services, and (b) the record is accurate.

## 2019-03-25 NOTE — NURSING NOTE
Met with pt and spouse at office visit.  Per Dr. Flores - all is stable.   To check DSA - if negative for 5 year check, ok to proceed with labs Q3 months.   Following with endo for diabetes mgmt, as well as weight management.

## 2019-03-25 NOTE — PROGRESS NOTES
"    Return Medical Weight Management Note     Elizabeth Palma  MRN:  [id number]  :  1955  AYSE:  3/25/2019    Dear Dr. Sheridan,      I had the pleasure of seeing your patient Elizabeth Palma.  She is a 63 year old female who I am continuing to see for treatment of obesity related to: DM-2, Hyperlipidemia, Sleep Apnea (has CPAP and does not use), GERD and Depression. S/p gastric bypass surgery in 1990.     CURRENT WEIGHT:   149 lbs 9.6 oz    Wt Readings from Last 4 Encounters:   19 67.9 kg (149 lb 9.6 oz)   19 67.9 kg (149 lb 12.8 oz)   19 66.7 kg (147 lb)   19 66.9 kg (147 lb 8 oz)     Body Mass Index:  Body mass index is 28.25 kg/m .  Vitals:  /75   Pulse 77   Ht 1.55 m (5' 1.02\")   Wt 67.9 kg (149 lb 9.6 oz)   LMP  (LMP Unknown)   SpO2 97%   BMI 28.25 kg/m      Initial consult weight was 214 on 2015.  Weight change since last seen on 2018 is up 2 lbs.   Total loss is 65 pounds.    INTERVAL HISTORY:  Topiramate 200 AM and 200 HS along with off gabapentin, and pain is ok. Reports she has been struggling with frequent cravings for salty snacks like crackers, chips, and pretzels throughout the day, but primarily at night. She doesn't think the topiramate is helping to decrease her appetite anymore. Denies any side effects. She feels full quickly ever since gastric bypass surgery in .      Breakfast - 1/2 bagel or piece of toast for breakfast   Lunch - 1/2 sandwich or goes out for vegetarian taco  Snacks most of the time - crackers, pretzels, vegetables, fruit, sunflower seeds   Dinner - varies, tries to eat vegetables    Has seen nutritionist who has recommended she begin getting consistent exercise. She has struggled with this due to chronic joint pain and fatigue.      Diet and Activity Changes Since Last Visit Reviewed With Patient 3/25/2019   I have made the following changes to my diet since my last visit: none   With regards to my diet, I am " still struggling with: hunge   For breakfast, I typically eat: -   For lunch, I typically eat: -   For supper, I typically eat: -   For snack(s), I typically eat: -   I have made the following changes to my activity/exercise since my last visit: none   With regards to my activity/exercise, I am still struggling with: need more     MEDICATIONS:   Current Outpatient Medications   Medication     acetaminophen (TYLENOL) 325 MG tablet     acetylcysteine (N-ACETYL-L-CYSTEINE) 600 MG CAPS capsule     albuterol (PROAIR HFA/PROVENTIL HFA/VENTOLIN HFA) 108 (90 Base) MCG/ACT inhaler     ARIPiprazole (ABILIFY) 2 MG tablet     aspirin EC 81 MG EC tablet     blood glucose monitoring (ACCU-CHEK RALPH PLUS) meter device kit     blood glucose monitoring (NO BRAND SPECIFIED) meter device kit     blood glucose monitoring (NO BRAND SPECIFIED) test strip     blood glucose monitoring (SOFTCLIX) lancets     Cholecalciferol (VITAMIN D) 1000 UNITS capsule     cyanocolbalamin (VITAMIN  B-12) 1000 MCG tablet     cycloSPORINE modified (GENERIC EQUIVALENT) 25 MG capsule     cycloSPORINE modified (GENERIC EQUIVALENT) 25 MG capsule     econazole nitrate 1 % cream     fluticasone (FLONASE) 50 MCG/ACT nasal spray     furosemide (LASIX) 20 MG tablet     latanoprost (XALATAN) 0.005 % ophthalmic solution     metFORMIN (GLUCOPHAGE-XR) 500 MG 24 hr tablet     mycophenolate (GENERIC EQUIVALENT) 250 MG capsule     omeprazole (PRILOSEC) 40 MG capsule     ondansetron (ZOFRAN-ODT) 4 MG ODT tab     order for DME     Polyvinyl Alcohol-Povidone (REFRESH OP)     prazosin (MINIPRESS) 5 MG capsule     simvastatin (ZOCOR) 20 MG tablet     sulfamethoxazole-trimethoprim (BACTRIM/SEPTRA) 400-80 MG per tablet     topiramate (TOPAMAX) 200 MG tablet     Vaginal Lubricant (REPLENS) GEL     vilazodone (VIIBRYD) 40 MG TABS tablet     Furosemide (LASIX) 20 MG tablet     No current facility-administered medications for this visit.        Weight Loss Medication History  Reviewed With Patient 3/25/2019   Which weight loss medications are you currently taking on a regular basis?  Topamax (topiramate)   Are you having any side effects from the weight loss medication that we have prescribed you? No   If you are having side effects please describe: -     ASSESSMENT:   Maintaining weight, has overall made good progress. Given her desire to lose another 10-15 lbs and her frequent snacking, we will try naltrexone 25 mg daily to curb cravings. Will check topiramate level today to see if there is room to increase this, although she is currently at the maximum recommended dose. For now will maintain topiramate 200 morning and evening. Advised she remove snacks from the home. Remains off gabapentin with acceptable neuropathy pain control.     FOLLOW-UP:    3-4 months  10/15 minutes spent on counseling and education    Note scribed for Dr. Irene by Jes Sorenson, MS3.     Attending physician addendum.   Pt was seen and evaluated with the medical student. Clinical data was reviewed and discussed with the patient and with the student. The note above reflects our history and findings, and the evaluation and plan reflects my clinical opinion.   10 of 15 minutes were spent in counseling, education, and discussion related to the above issues.

## 2019-03-25 NOTE — NURSING NOTE
"  Chief Complaint   Patient presents with     Weight Problem     RMWM     Vitals:    03/25/19 1036   BP: 130/75   Pulse: 77   SpO2: 97%   Weight: 67.9 kg (149 lb 9.6 oz)   Height: 1.55 m (5' 1.02\")     Body mass index is 28.25 kg/m .  Sally Smiley CMA    "

## 2019-03-26 LAB — TOPIRAMATE SERPL-MCNC: 23.2 UG/ML (ref 5–20)

## 2019-03-27 ENCOUNTER — ALLIED HEALTH/NURSE VISIT (OUTPATIENT)
Dept: SURGERY | Facility: CLINIC | Age: 64
End: 2019-03-27
Payer: MEDICARE

## 2019-03-27 VITALS — WEIGHT: 151.1 LBS | BODY MASS INDEX: 28.53 KG/M2

## 2019-03-27 DIAGNOSIS — Z94.0 KIDNEY TRANSPLANTED: ICD-10-CM

## 2019-03-27 DIAGNOSIS — Z48.298 AFTERCARE FOLLOWING ORGAN TRANSPLANT: ICD-10-CM

## 2019-03-27 LAB
ANION GAP SERPL CALCULATED.3IONS-SCNC: 9 MMOL/L (ref 3–14)
BUN SERPL-MCNC: 20 MG/DL (ref 7–30)
CALCIUM SERPL-MCNC: 9 MG/DL (ref 8.5–10.1)
CHLORIDE SERPL-SCNC: 109 MMOL/L (ref 94–109)
CO2 SERPL-SCNC: 21 MMOL/L (ref 20–32)
CREAT SERPL-MCNC: 0.91 MG/DL (ref 0.52–1.04)
CYCLOSPORINE BLD LC/MS/MS-MCNC: 95 UG/L (ref 50–400)
ERYTHROCYTE [DISTWIDTH] IN BLOOD BY AUTOMATED COUNT: 15.8 % (ref 10–15)
GFR SERPL CREATININE-BSD FRML MDRD: 68 ML/MIN/{1.73_M2}
GLUCOSE SERPL-MCNC: 159 MG/DL (ref 70–99)
HCT VFR BLD AUTO: 35.9 % (ref 35–47)
HGB BLD-MCNC: 10.9 G/DL (ref 11.7–15.7)
MCH RBC QN AUTO: 25.3 PG (ref 26.5–33)
MCHC RBC AUTO-ENTMCNC: 30.4 G/DL (ref 31.5–36.5)
MCV RBC AUTO: 83 FL (ref 78–100)
PLATELET # BLD AUTO: 166 10E9/L (ref 150–450)
POTASSIUM SERPL-SCNC: 3.7 MMOL/L (ref 3.4–5.3)
RBC # BLD AUTO: 4.31 10E12/L (ref 3.8–5.2)
SODIUM SERPL-SCNC: 140 MMOL/L (ref 133–144)
TME LAST DOSE: NORMAL H
WBC # BLD AUTO: 3.4 10E9/L (ref 4–11)

## 2019-03-27 PROCEDURE — 80158 DRUG ASSAY CYCLOSPORINE: CPT | Performed by: INTERNAL MEDICINE

## 2019-03-27 NOTE — PROGRESS NOTES
"Nutrition Reassessment  Reason For Visit:  Elizabeth Palma is a 63 year-old female with type 2 DM (oral med management) presenting today for nutrition follow-up, s/p \"gastric bypass in 1990 at [facility]\" per Pt report.  She is seeing medical weight management.  She was referred by Dr. Irene.  Note: Pt had a kidney transplant on March 20, 2014.    Pt was accompanied by her .     Anthropometrics:  Height: 61\"  Pre-op Weight: 265 lbs per Pt report; lowest weight after bariatric surgery: 165-170 lbs (25 years ago)  (Pt reported that she initially was at 215 lbs in 2015)    Current Weight: 151.1 lbs (+1.3 lbs in the past month)    Current Vitamins/Minerals: 3000 International Units vitamin D/day, Calcium 2x daily, vitamin C, vitamin B12 daily, B-complex  *Pt stated that she was told not to take other vitamins/minerals by MD.   3/27/19: Started Naltrexone at most recent MD appointment    Nutrition History:  Lactose intolerance ever since bariatric surgery.  Pt stated that she has osteoporosis; however, she stated that her doctors don't want her to take any vitamins or minerals due to her kidney transplant.    Diet/Nutrition Intake Hx:   On previous RD visits, pt reported her intake is affected by nausea 2/2 her anti-rejection medications.  Pt is also limited in protein choices that she likes - pt reports she does not take much dairy, does not like beans and lentils, keeps kosher. Pt also stated on previous visits that she will not record food intake due to the fact that every time she does this, she simply does not eat as she doesn't want to record.  She stated that her therapist strongly advises against recording food intake for this reason.     Physical Activity Note: She reported doing some walking in hallways and in hallways at work, but no structured exercise. At a previous RD visit reported that she has a tendency to break bones so she is limited with what exercises she does.     Diet recall:   Breakfast: " skip  Lunch: skip  Dinner: small portions - protein in the intsant pot  No snacks  Beverages: water    Progress with Previous Goals:   1. Eat 3 meals with lean protein at each meal, along with a non-starchy vegetable or whole fruit, up to 1/2 c carb at a meal. May have bites of food at meals    -Can try a protein drink for breakfast meal Such as [facility] drinks or Integrated supplement (no sugar substitute and has a small sized container to try), pure protein, muscle. or protein water ([facility] clear protein drink or Bipro or Premier clear). Can add protein powder into coffee for example.  2. If needing a snack, have vegetables or protein snack (give at least 2 hours between a meals and a snack). Not snacking between meals   3. Walk 10 min daily, increasing as able. Continues walking, was more active while her grandson was visting   4. Continue working on Meal planning for all meals. recently got an instant pot still trying to figure out how to use it.     Nutrition Prescription:   Grams Protein: 60 (minimum) - Not meeting consistently.   Amount of Fluid: 48-64 oz    Nutrition Diagnosis  Current: Overweight related to history of excessive energy intake, variable eating habits/intake and lack of sufficient exercise as evidenced by pt report and BMI > 25. continues    Intervention  Intervention At Appointment:  Materials/Education provided: Reviewed her progress towards previous goals.  Patient reported that she is not eating, she might have a few bites of food at meals but more often skips meals completely.  Patient reported that she is not using any meal replacements or protein supplements as MD told her not to use these products.  She is concerned that she is gaining weight so she is choosing not to eat even though she is hungry.  Discussed the importance of regular meals and protein at meals for healthy weight loss.  Patient stated that her goal is to be 135 pounds, discussed healthy weight expectations for age and  height.  Gave encouragement and support.    Patient Understanding: good  Expected Compliance: good with continued RD follow up.     Goals:   1. Eat 3 meals with lean protein at each meal, along with a non-starchy vegetable or whole fruit, up to 1/2 c carb at a meal.   -Can try a protein drink for breakfast meal Such as [facility] drinks or Integrated supplement (no sugar substitute and has a small sized container to try), pure protein, muscle. or protein water ([facility] clear protein drink or Bipro or Premier clear). Can add protein powder into coffee for example.  2. If needing a snack, have vegetables or protein snack (give at least 2 hours between a meals and a snack).  3. Walk 10 min daily, increasing as able.  4. Continue working on Meal planning for all meals.    Follow-Up: 1 month or PRN     Time spent with patient: 30 minutes.  Leyla Guerrero, RD, LD

## 2019-04-02 ENCOUNTER — TELEPHONE (OUTPATIENT)
Dept: TRANSPLANT | Facility: CLINIC | Age: 64
End: 2019-04-02

## 2019-04-02 NOTE — TELEPHONE ENCOUNTER
Notes recorded by Christian Jimenez MD on 3/29/2019 at 9:08 AM CDT  slighthly worsening anemia   Needs iron stores and follow up with pcp  ------    Notes recorded by Rosa Kevin RN on 3/27/2019 at 1:43 PM CDT  CSA at goal. Results at baseline.      PLAN:  Call Elizabeth Palma and recommend following with PCP re: anemia.

## 2019-04-03 ENCOUNTER — TELEPHONE (OUTPATIENT)
Dept: TRANSPLANT | Facility: CLINIC | Age: 64
End: 2019-04-03

## 2019-04-03 NOTE — TELEPHONE ENCOUNTER
"INCOMING CALL  Call from patient's spouse, Shawn, expressing concern about Elizabeth's blood counts and weakness. She was seen last week by Dr. Flores in transplant/nephrology clinic.    CONCERNS  - weakness when standing, needs to stand and hold onto something before walking. On furosemide for edema.  - \"blood counts\" are low  - stomach upset which he/she relates to new medication started by her weight management doctor    PLAN  - increase fluid intake (but don't overdo it) and see if symptoms of orthostatic hypotension improve  - Dr. Flores recommended see primary care to check iron for slowly decreasing hemoglobin  - if Elizabeth believes her GI symptoms are related to a new weight management medication, she should contact the provider who prescribed the medication    Mr. Palma verbalized understanding of the plan/recommendations of the transplant team.  "

## 2019-04-05 ENCOUNTER — TELEPHONE (OUTPATIENT)
Dept: PSYCHIATRY | Facility: CLINIC | Age: 64
End: 2019-04-05

## 2019-04-05 NOTE — TELEPHONE ENCOUNTER
-Writer returned call to Elizabeth.  She reports that she is going to be out of town on 4/16/19 as her daughter in law is having twins.  She would like to get into see Dr. Mamie TERRY as she reports her nightmares are getting worse and she is only getting about 4 hours of sleep at night.  Writer scheduled her for 4/23/19 at 10:30am.  Provider informed via in-basked message.

## 2019-04-05 NOTE — TELEPHONE ENCOUNTER
----- Message from Debbie Alexandre LPN sent at 4/5/2019  4:40 PM CDT -----  Regarding: brian  Caller: Elizabeth    Relationship to Patient: self    Call Back Number: [phone]    Reason for Call: Please call the patient to discuss scheduling with Dr. Hatch. Thank you

## 2019-04-09 ENCOUNTER — OFFICE VISIT (OUTPATIENT)
Dept: DERMATOLOGY | Facility: CLINIC | Age: 64
End: 2019-04-09
Payer: MEDICARE

## 2019-04-09 DIAGNOSIS — D18.01 CHERRY ANGIOMA: ICD-10-CM

## 2019-04-09 DIAGNOSIS — D84.9 IMMUNOSUPPRESSED STATUS (H): ICD-10-CM

## 2019-04-09 DIAGNOSIS — D22.9 MULTIPLE NEVI: ICD-10-CM

## 2019-04-09 DIAGNOSIS — L82.1 SK (SEBORRHEIC KERATOSIS): Primary | ICD-10-CM

## 2019-04-09 ASSESSMENT — PAIN SCALES - GENERAL: PAINLEVEL: NO PAIN (0)

## 2019-04-09 NOTE — PROGRESS NOTES
[facility] Dermatology Note      Dermatology Problem List:  1. Renal transplant 3/20/14, on cyclosporine and mycophenolate mofetil    Encounter Date: 2019    CC:   Chief Complaint   Patient presents with     Skin Check     Kidney transplant in . Elizabeth has a few spots of concern today.       History of Present Illness:  Ms. Elizabeth Palma is a 63 year old female who presents as a follow-up for skin chek. The patient was last seen 3/2018. History of renal transplant in , on cyclosporine and mycophenolate mofetil. She denies any new lesions of concern. No bleeding, tender, or growing lesions. No significant sun exposure. Otherwise no concerns.    Past Medical History:   Patient Active Problem List   Diagnosis     Autosomal dominant polycystic kidney disease     Hypertension     Hyperlipidemia     Abnormal MRI, cervical spine     Sensory loss     Premature menopause age 37     OP (osteoporosis) T score -3.8     LION on CPAP     Major depressive disorder, recurrent episode, moderate (H)     Generalized anxiety disorder     CMC DJD(carpometacarpal degenerative joint disease), localized primary     Pain in joint, forearm -- L unhealed Fx     -donor kidney transplant     Immunosuppressed status (H)     Hyperparathyroidism, secondary (H)     Hyperparathyroidism (H)     Senile osteoporosis     Pain in joint involving ankle and foot     Nightmares associated with chronic post-traumatic stress disorder     Type 2 diabetes mellitus with peripheral neuropathy (H)     Posttraumatic stress disorder     Right knee pain     Left knee pain     Age-related osteoporosis without current pathological fracture     Narcissistic personality disorder (H)     Personal history of other drug therapy     Median sensory neuropathy, left     Past Medical History:   Diagnosis Date     Abnormal MRI, cervical spine 10/15/2011    2011; mild changes noted. Study done for left arm symptoms Impression:  1.  Mild multilevel degenerative disc disease with no significant canal or neural stenosis seen. motion artifact on the STIR images in these are not interpretable. The remaining images were interpreted      Autosomal dominant polycystic kidney disease 2011     (Problem list name updated by automated process. Provider to review and confirm.)     CMC DJD(carpometacarpal degenerative joint disease), localized primary 3/5/2013     -donor kidney transplant 3/20/2014     Depressive disorder 11/15/2012     DM type 2 (diabetes mellitus, type 2) (H) 2013     Encounter for long-term (current) use of other medications 2015     Family history of tremor 10/17/2011     Gastroesophageal reflux disease      Generalized anxiety disorder 11/15/2012     Glaucoma      Hyperlipidemia 10/15/2011     Hyperparathyroidism, secondary (H) 2015     Hypertension     resolved     Immunosuppressed status (H) 3/20/2014     Major depressive disorder, recurrent episode, moderate (H) 11/15/2012     Obesity (BMI 30-39.9)      OP (osteoporosis) T score -3.8 2009 T-score -3.7      LION (obstructive sleep apnoea) 10/15/2012    intol to cpa     Pain in joint, forearm -- L unhealed Fx 2013     Premature menopause age 37 7/10/2012    OCP (vaginal bldg)-->HT which she stopped 2 mo later documented at 2007 visit (age 51).      Restless leg syndrome      Rib fractures 2013     Sensory loss 10/17/2011    Bottom of feet; uncertain if there is a neuropathy per notes.       Stiffness of joint, not elsewhere classified, hand 3/5/2013     Tremor 10/15/2011    head     Uncomplicated asthma      Past Surgical History:   Procedure Laterality Date     ABDOMEN SURGERY       ANKLE SURGERY       C TRANSPLANTATION OF KIDNEY  3/2014     C/SECTION, LOW TRANSVERSE      x 2     CHOLECYSTECTOMY       COLONOSCOPY       ESOPHAGOSCOPY, GASTROSCOPY, DUODENOSCOPY (EGD), COMBINED N/A 2015    Procedure: COMBINED  ESOPHAGOSCOPY, GASTROSCOPY, DUODENOSCOPY (EGD);  Surgeon: Sky Davey MD;  Location:  GI     ESOPHAGOSCOPY, GASTROSCOPY, DUODENOSCOPY (EGD), COMBINED N/A 5/19/2015    Procedure: COMBINED ESOPHAGOSCOPY, GASTROSCOPY, DUODENOSCOPY (EGD), BIOPSY SINGLE OR MULTIPLE;  Surgeon: Sky Davey MD;  Location:  GI     EYE SURGERY       LAPAROSCOPY, SURGICAL; REPAIR INCISIONAL OR VENTRAL HERNIA       ORTHOPEDIC SURGERY       HERMINIA EN Y BOWEL  1990     WRIST SURGERY         Social History:  Patient reports that she has quit smoking. She has never used smokeless tobacco. She reports that she does not drink alcohol or use drugs.    Family History:  Family History   Problem Relation Age of Onset     Mental Illness Other         family hx     Heart Disease Other      Diabetes Other      Cancer Other      Genetic Disorder Father      Mental Illness Father      Diabetes Father      Hypertension Father      Hyperlipidemia Mother      Diabetes Mother      Hypertension Mother      Mental Illness Other      Diabetes Other      Glaucoma No family hx of      Macular Degeneration No family hx of      Hypertension No family hx of        Medications:  Current Outpatient Medications   Medication Sig Dispense Refill     acetaminophen (TYLENOL) 325 MG tablet Take 325-650 mg by mouth as needed       acetylcysteine (N-ACETYL-L-CYSTEINE) 600 MG CAPS capsule Take 2 400 mg caps two times daily for total daily dose of 800 mg 30 capsule      albuterol (PROAIR HFA/PROVENTIL HFA/VENTOLIN HFA) 108 (90 Base) MCG/ACT inhaler Inhale 2 puffs into the lungs every 6 hours as needed for shortness of breath / dyspnea or wheezing 1 Inhaler 5     ARIPiprazole (ABILIFY) 2 MG tablet Take 1 tablet (2 mg) by mouth daily 30 tablet 3     aspirin EC 81 MG EC tablet Take 1 tablet (81 mg) by mouth daily 30 tablet 5     blood glucose monitoring (ACCU-CHEK RALPH PLUS) meter device kit   0     blood glucose monitoring (NO BRAND SPECIFIED) meter device kit  Use to test blood sugar 2 times daily or as directed. 1 kit 0     blood glucose monitoring (NO BRAND SPECIFIED) test strip Use to test blood sugars 2 times daily or as directed 100 strip 11     blood glucose monitoring (SOFTCLIX) lancets Use to test blood sugar 2 times daily or as directed. 1 Box 11     Cholecalciferol (VITAMIN D) 1000 UNITS capsule 2,000 Units  30 capsule      cyanocolbalamin (VITAMIN  B-12) 1000 MCG tablet Take 1 tablet by mouth daily. (Patient taking differently: Take 1,000 mcg by mouth every other day ) 30 tablet 0     cycloSPORINE modified (GENERIC EQUIVALENT) 25 MG capsule TAKE 5 CAPSULES (125MG) BY MOUTH TWO TIMES A  capsule 6     cycloSPORINE modified (GENERIC EQUIVALENT) 25 MG capsule Take 5 capsules (125 mg) by mouth 2 times daily 300 capsule 6     econazole nitrate 1 % cream Apply topically daily 85 g 3     fluticasone (FLONASE) 50 MCG/ACT nasal spray Spray 1 spray into both nostrils daily *SHAKE LIQUID GENTLY 16 mL 2     furosemide (LASIX) 20 MG tablet Take 1 tablet (20 mg) by mouth daily 90 tablet 3     latanoprost (XALATAN) 0.005 % ophthalmic solution Place 1 drop into both eyes At Bedtime 7.5 mL 2     metFORMIN (GLUCOPHAGE-XR) 500 MG 24 hr tablet Take 3 tablets (1,500 mg) by mouth daily (with dinner) 270 tablet 1     mycophenolate (GENERIC EQUIVALENT) 250 MG capsule Take 4 capsules (1,000 mg) by mouth 2 times daily 240 capsule 11     naltrexone (DEPADE/REVIA) 50 MG tablet Take 1/2 tablet.  Time it one to two hours prior to worst cravings.  Then increase to one full tablet as instructed. 30 tablet 5     omeprazole (PRILOSEC) 40 MG capsule Take 1 capsule (40 mg) by mouth daily 90 capsule 3     ondansetron (ZOFRAN-ODT) 4 MG ODT tab Take 1 tablet (4 mg) by mouth every 6 hours as needed 20 tablet 3     order for DME Walker with front wheels and a seat. 1 Units 0     Polyvinyl Alcohol-Povidone (REFRESH OP) Apply to eye as needed Both eyes       prazosin (MINIPRESS) 5 MG capsule Take  3 capsules (15 mg) by mouth At Bedtime 90 capsule 3     simvastatin (ZOCOR) 20 MG tablet Take 1 tablet (20 mg) by mouth At Bedtime 90 tablet 3     sulfamethoxazole-trimethoprim (BACTRIM/SEPTRA) 400-80 MG per tablet Take 1 tablet by mouth daily 90 tablet 3     topiramate (TOPAMAX) 200 MG tablet Take 1 tablet (200 mg) by mouth 2 times daily 60 tablet 5     Vaginal Lubricant (REPLENS) GEL Use vaginally as needed. Can use up to 3 times per week. 35 g 11     vilazodone (VIIBRYD) 40 MG TABS tablet Take 1 tablet (40 mg) by mouth daily 30 tablet 3     Furosemide (LASIX) 20 MG tablet Take 1 tablet (20 mg) by mouth daily 30 tablet 0        Allergies   Allergen Reactions     Percocet [Oxycodone-Acetaminophen] Nausea and Vomiting     Novocain [Procaine Hcl] Hives     Had reaction 25 years ago to old renetta. Pt reports multiple injections of lidocaine since then without reaction.  Tolerated lidocaine injection today without difficulty.  Osmar Mark MD IR Service.         Review of Systems:  -As per HPI  -Constitutional: Otherwise feeling well today, in usual state of health.  -HEENT: Patient denies nonhealing oral sores.  -Skin: As above in HPI. No additional skin concerns.    Physical exam:  Vitals: LMP  (LMP Unknown)   GEN: This is a well developed, well-nourished female in no acute distress, in a pleasant mood.    SKIN: Full skin including the undergarment areas was performed (female genitalia not examined, but buttocks examined). The exam included the head/face, neck, both arms, chest, back, abdomen, both legs, buttocks, digits and/or nails.   -Left volar wrist with few 1-3 mm petechiae  -Right medial thigh with a 4x3mm brown macule with slightly moth-eaten border  -Multiple regular brown pigmented macules and papules are identified on the face, trunk, and extremities.  -There are waxy stuck on tan to brown papules on the face, trunk, and extremities.  -There are dome shaped bright red papules on the body and  extremities  -No other lesions of concern on areas examined.     Impression/Plan:  1. Seborrheic keratosis, non irritated    Reassured benign etiology     2. Multiple clinically benign nevi    Reassured benign etiology     3. Cherry angioma(s)    Reassured benign etiology    4.    History of renal transplant on immunosuppression    No lesions of concern    Recommend yearly skin check    Sunscreen recommended    5. Benign melanocytic nevi of the trunk  - reassurance provided; no lesions concerning for malignancy  - photoprotection (regular use of SPF30+ broad spectrum sunscreen and sun protective clothing) recommended  - ABCDE of melanoma discussed    Follow-up in 1 year, earlier for new or changing lesions.     Dr. Mcduffie staffed the patient.    Staff Physician Comments:   I saw and evaluated the patient with the resident and I agree with the assessment and plan.  I was present for the examination. I have made edits if needed.    Lonny Mcduffie MD  Staff Dermatologist and Dermatopathologist  , Department of Dermatology

## 2019-04-09 NOTE — NURSING NOTE
Dermatology Rooming Note    Elizabeth Palma's goals for this visit include:   Chief Complaint   Patient presents with     Skin Check     Kidney transplant in 2014. Elizabeth has a few spots of concern today.     Danielle Allred, CMA

## 2019-04-09 NOTE — LETTER
2019       RE: Elizabeth Palma  [address]  [facility] [address]     Dear Colleague,    Thank you for referring your patient, Elizabeth Palma, to the [facility] DERMATOLOGY at [facility]. Please see a copy of my visit note below.    [facility] Dermatology Note      Dermatology Problem List:  1. Renal transplant 3/20/14, on cyclosporine and mycophenolate mofetil    Encounter Date: 2019    CC:   Chief Complaint   Patient presents with     Skin Check     Kidney transplant in . Elizabeth has a few spots of concern today.       History of Present Illness:  Ms. Elizabeth Palma is a 63 year old female who presents as a follow-up for skin chek. The patient was last seen 3/2018. History of renal transplant in , on cyclosporine and mycophenolate mofetil. She denies any new lesions of concern. No bleeding, tender, or growing lesions. No significant sun exposure. Otherwise no concerns.    Past Medical History:   Patient Active Problem List   Diagnosis     Autosomal dominant polycystic kidney disease     Hypertension     Hyperlipidemia     Abnormal MRI, cervical spine     Sensory loss     Premature menopause age 37     OP (osteoporosis) T score -3.8     LION on CPAP     Major depressive disorder, recurrent episode, moderate (H)     Generalized anxiety disorder     CMC DJD(carpometacarpal degenerative joint disease), localized primary     Pain in joint, forearm -- L unhealed Fx     -donor kidney transplant     Immunosuppressed status (H)     Hyperparathyroidism, secondary (H)     Hyperparathyroidism (H)     Senile osteoporosis     Pain in joint involving ankle and foot     Nightmares associated with chronic post-traumatic stress disorder     Type 2 diabetes mellitus with peripheral neuropathy (H)     Posttraumatic stress disorder     Right knee pain     Left knee pain     Age-related osteoporosis without current pathological  fracture     Narcissistic personality disorder (H)     Personal history of other drug therapy     Median sensory neuropathy, left     Past Medical History:   Diagnosis Date     Abnormal MRI, cervical spine 10/15/2011    2011; mild changes noted. Study done for left arm symptoms Impression:  1. Mild multilevel degenerative disc disease with no significant canal or neural stenosis seen. motion artifact on the STIR images in these are not interpretable. The remaining images were interpreted      Autosomal dominant polycystic kidney disease 2011     (Problem list name updated by automated process. Provider to review and confirm.)     CMC DJD(carpometacarpal degenerative joint disease), localized primary 3/5/2013     -donor kidney transplant 3/20/2014     Depressive disorder 11/15/2012     DM type 2 (diabetes mellitus, type 2) (H) 2013     Encounter for long-term (current) use of other medications 2015     Family history of tremor 10/17/2011     Gastroesophageal reflux disease      Generalized anxiety disorder 11/15/2012     Glaucoma      Hyperlipidemia 10/15/2011     Hyperparathyroidism, secondary (H) 2015     Hypertension     resolved     Immunosuppressed status (H) 3/20/2014     Major depressive disorder, recurrent episode, moderate (H) 11/15/2012     Obesity (BMI 30-39.9)      OP (osteoporosis) T score -3.8 2009 T-score -3.7      LION (obstructive sleep apnoea) 10/15/2012    intol to cpa     Pain in joint, forearm -- L unhealed Fx 2013     Premature menopause age 37 7/10/2012    OCP (vaginal bldg)-->HT which she stopped 2 mo later documented at 2007 visit (age 51).      Restless leg syndrome      Rib fractures 2013     Sensory loss 10/17/2011    Bottom of feet; uncertain if there is a neuropathy per notes.       Stiffness of joint, not elsewhere classified, hand 3/5/2013     Tremor 10/15/2011    head     Uncomplicated asthma      Past Surgical History:    Procedure Laterality Date     ABDOMEN SURGERY       ANKLE SURGERY       C TRANSPLANTATION OF KIDNEY  3/2014     C/SECTION, LOW TRANSVERSE      x 2     CHOLECYSTECTOMY  1990     COLONOSCOPY       ESOPHAGOSCOPY, GASTROSCOPY, DUODENOSCOPY (EGD), COMBINED N/A 5/19/2015    Procedure: COMBINED ESOPHAGOSCOPY, GASTROSCOPY, DUODENOSCOPY (EGD);  Surgeon: Sky Davey MD;  Location:  GI     ESOPHAGOSCOPY, GASTROSCOPY, DUODENOSCOPY (EGD), COMBINED N/A 5/19/2015    Procedure: COMBINED ESOPHAGOSCOPY, GASTROSCOPY, DUODENOSCOPY (EGD), BIOPSY SINGLE OR MULTIPLE;  Surgeon: Sky Davey MD;  Location:  GI     EYE SURGERY       LAPAROSCOPY, SURGICAL; REPAIR INCISIONAL OR VENTRAL HERNIA       ORTHOPEDIC SURGERY       HERMINIA EN Y BOWEL  1990     WRIST SURGERY         Social History:  Patient reports that she has quit smoking. She has never used smokeless tobacco. She reports that she does not drink alcohol or use drugs.    Family History:  Family History   Problem Relation Age of Onset     Mental Illness Other         family hx     Heart Disease Other      Diabetes Other      Cancer Other      Genetic Disorder Father      Mental Illness Father      Diabetes Father      Hypertension Father      Hyperlipidemia Mother      Diabetes Mother      Hypertension Mother      Mental Illness Other      Diabetes Other      Glaucoma No family hx of      Macular Degeneration No family hx of      Hypertension No family hx of        Medications:  Current Outpatient Medications   Medication Sig Dispense Refill     acetaminophen (TYLENOL) 325 MG tablet Take 325-650 mg by mouth as needed       acetylcysteine (N-ACETYL-L-CYSTEINE) 600 MG CAPS capsule Take 2 400 mg caps two times daily for total daily dose of 800 mg 30 capsule      albuterol (PROAIR HFA/PROVENTIL HFA/VENTOLIN HFA) 108 (90 Base) MCG/ACT inhaler Inhale 2 puffs into the lungs every 6 hours as needed for shortness of breath / dyspnea or wheezing 1 Inhaler 5     ARIPiprazole  (ABILIFY) 2 MG tablet Take 1 tablet (2 mg) by mouth daily 30 tablet 3     aspirin EC 81 MG EC tablet Take 1 tablet (81 mg) by mouth daily 30 tablet 5     blood glucose monitoring (ACCU-CHEK RALPH PLUS) meter device kit   0     blood glucose monitoring (NO BRAND SPECIFIED) meter device kit Use to test blood sugar 2 times daily or as directed. 1 kit 0     blood glucose monitoring (NO BRAND SPECIFIED) test strip Use to test blood sugars 2 times daily or as directed 100 strip 11     blood glucose monitoring (SOFTCLIX) lancets Use to test blood sugar 2 times daily or as directed. 1 Box 11     Cholecalciferol (VITAMIN D) 1000 UNITS capsule 2,000 Units  30 capsule      cyanocolbalamin (VITAMIN  B-12) 1000 MCG tablet Take 1 tablet by mouth daily. (Patient taking differently: Take 1,000 mcg by mouth every other day ) 30 tablet 0     cycloSPORINE modified (GENERIC EQUIVALENT) 25 MG capsule TAKE 5 CAPSULES (125MG) BY MOUTH TWO TIMES A  capsule 6     cycloSPORINE modified (GENERIC EQUIVALENT) 25 MG capsule Take 5 capsules (125 mg) by mouth 2 times daily 300 capsule 6     econazole nitrate 1 % cream Apply topically daily 85 g 3     fluticasone (FLONASE) 50 MCG/ACT nasal spray Spray 1 spray into both nostrils daily *SHAKE LIQUID GENTLY 16 mL 2     furosemide (LASIX) 20 MG tablet Take 1 tablet (20 mg) by mouth daily 90 tablet 3     latanoprost (XALATAN) 0.005 % ophthalmic solution Place 1 drop into both eyes At Bedtime 7.5 mL 2     metFORMIN (GLUCOPHAGE-XR) 500 MG 24 hr tablet Take 3 tablets (1,500 mg) by mouth daily (with dinner) 270 tablet 1     mycophenolate (GENERIC EQUIVALENT) 250 MG capsule Take 4 capsules (1,000 mg) by mouth 2 times daily 240 capsule 11     naltrexone (DEPADE/REVIA) 50 MG tablet Take 1/2 tablet.  Time it one to two hours prior to worst cravings.  Then increase to one full tablet as instructed. 30 tablet 5     omeprazole (PRILOSEC) 40 MG capsule Take 1 capsule (40 mg) by mouth daily 90 capsule 3      ondansetron (ZOFRAN-ODT) 4 MG ODT tab Take 1 tablet (4 mg) by mouth every 6 hours as needed 20 tablet 3     order for DME Walker with front wheels and a seat. 1 Units 0     Polyvinyl Alcohol-Povidone (REFRESH OP) Apply to eye as needed Both eyes       prazosin (MINIPRESS) 5 MG capsule Take 3 capsules (15 mg) by mouth At Bedtime 90 capsule 3     simvastatin (ZOCOR) 20 MG tablet Take 1 tablet (20 mg) by mouth At Bedtime 90 tablet 3     sulfamethoxazole-trimethoprim (BACTRIM/SEPTRA) 400-80 MG per tablet Take 1 tablet by mouth daily 90 tablet 3     topiramate (TOPAMAX) 200 MG tablet Take 1 tablet (200 mg) by mouth 2 times daily 60 tablet 5     Vaginal Lubricant (REPLENS) GEL Use vaginally as needed. Can use up to 3 times per week. 35 g 11     vilazodone (VIIBRYD) 40 MG TABS tablet Take 1 tablet (40 mg) by mouth daily 30 tablet 3     Furosemide (LASIX) 20 MG tablet Take 1 tablet (20 mg) by mouth daily 30 tablet 0      Allergies   Allergen Reactions     Percocet [Oxycodone-Acetaminophen] Nausea and Vomiting     Novocain [Procaine Hcl] Hives     Had reaction 25 years ago to old renetta. Pt reports multiple injections of lidocaine since then without reaction.  Tolerated lidocaine injection today without difficulty.  Osmar Mark MD IR Service.         Review of Systems:  -As per HPI  -Constitutional: Otherwise feeling well today, in usual state of health.  -HEENT: Patient denies nonhealing oral sores.  -Skin: As above in HPI. No additional skin concerns.    Physical exam:  Vitals: LMP  (LMP Unknown)   GEN: This is a well developed, well-nourished female in no acute distress, in a pleasant mood.    SKIN:  Full skin including the undergarment areas was performed (female genitalia not examined, but buttocks examined). The exam included the head/face, neck, both arms, chest, back, abdomen, both legs, buttocks, digits and/or nails.   -Left volar wrist with few 1-3 mm petechiae  -Right medial thigh with a 4x3mm brown macule with  slightly moth-eaten border  -Multiple regular brown pigmented macules and papules are identified on the face, trunk, and extremities.  -There are waxy stuck on tan to brown papules on the face, trunk, and extremities.  -There are dome shaped bright red papules on the body and extremities  -No other lesions of concern on areas examined.     Impression/Plan:  1. Seborrheic keratosis, non irritated    Reassured benign etiology     2. Multiple clinically benign nevi    Reassured benign etiology     3. Cherry angioma(s)    Reassured benign etiology    4.    History of renal transplant on immunosuppression    No lesions of concern    Recommend yearly skin check    Sunscreen recommended    5. Benign melanocytic nevi of the trunk  - reassurance provided; no lesions concerning for malignancy  - photoprotection (regular use of SPF30+ broad spectrum sunscreen and sun protective clothing) recommended  - ABCDE of melanoma discussed    Follow-up in 1 year, earlier for new or changing lesions.     Dr. Mcduffie staffed the patient.    Staff Physician Comments:   I saw and evaluated the patient with the resident and I agree with the assessment and plan.  I was present for the examination. I have made edits if needed.    Lonny Mcduffie MD  Staff Dermatologist and Dermatopathologist  , Department of Dermatology

## 2019-04-10 ENCOUNTER — OFFICE VISIT (OUTPATIENT)
Dept: NEUROLOGY | Facility: CLINIC | Age: 64
End: 2019-04-10
Payer: MEDICARE

## 2019-04-10 ENCOUNTER — OFFICE VISIT (OUTPATIENT)
Dept: INTERNAL MEDICINE | Facility: CLINIC | Age: 64
End: 2019-04-10
Payer: MEDICARE

## 2019-04-10 ENCOUNTER — PRE VISIT (OUTPATIENT)
Dept: NEUROLOGY | Facility: CLINIC | Age: 64
End: 2019-04-10

## 2019-04-10 VITALS
DIASTOLIC BLOOD PRESSURE: 74 MMHG | RESPIRATION RATE: 16 BRPM | HEART RATE: 85 BPM | OXYGEN SATURATION: 98 % | SYSTOLIC BLOOD PRESSURE: 112 MMHG | WEIGHT: 154.5 LBS | BODY MASS INDEX: 29.17 KG/M2 | HEIGHT: 61 IN | TEMPERATURE: 98.1 F

## 2019-04-10 VITALS
BODY MASS INDEX: 29.31 KG/M2 | SYSTOLIC BLOOD PRESSURE: 112 MMHG | DIASTOLIC BLOOD PRESSURE: 74 MMHG | HEART RATE: 85 BPM | WEIGHT: 155.2 LBS

## 2019-04-10 DIAGNOSIS — R25.1 TREMOR: Primary | ICD-10-CM

## 2019-04-10 DIAGNOSIS — R25.1 TREMOR: ICD-10-CM

## 2019-04-10 DIAGNOSIS — R26.9 ABNORMAL GAIT: Primary | ICD-10-CM

## 2019-04-10 DIAGNOSIS — R42 DISEQUILIBRIUM: ICD-10-CM

## 2019-04-10 LAB
ANION GAP SERPL CALCULATED.3IONS-SCNC: 8 MMOL/L (ref 3–14)
BASOPHILS # BLD AUTO: 0.1 10E9/L (ref 0–0.2)
BASOPHILS NFR BLD AUTO: 1.3 %
BUN SERPL-MCNC: 23 MG/DL (ref 7–30)
CALCIUM SERPL-MCNC: 9.9 MG/DL (ref 8.5–10.1)
CHLORIDE SERPL-SCNC: 104 MMOL/L (ref 94–109)
CO2 SERPL-SCNC: 26 MMOL/L (ref 20–32)
CREAT SERPL-MCNC: 0.88 MG/DL (ref 0.52–1.04)
DIFFERENTIAL METHOD BLD: ABNORMAL
EOSINOPHIL # BLD AUTO: 0.1 10E9/L (ref 0–0.7)
EOSINOPHIL NFR BLD AUTO: 3.4 %
ERYTHROCYTE [DISTWIDTH] IN BLOOD BY AUTOMATED COUNT: 14.9 % (ref 10–15)
GFR SERPL CREATININE-BSD FRML MDRD: 71 ML/MIN/{1.73_M2}
GLUCOSE SERPL-MCNC: 124 MG/DL (ref 70–99)
HCT VFR BLD AUTO: 39.8 % (ref 35–47)
HGB BLD-MCNC: 11.7 G/DL (ref 11.7–15.7)
IMM GRANULOCYTES # BLD: 0 10E9/L (ref 0–0.4)
IMM GRANULOCYTES NFR BLD: 0.3 %
LYMPHOCYTES # BLD AUTO: 0.3 10E9/L (ref 0.8–5.3)
LYMPHOCYTES NFR BLD AUTO: 8.5 %
MCH RBC QN AUTO: 24.3 PG (ref 26.5–33)
MCHC RBC AUTO-ENTMCNC: 29.4 G/DL (ref 31.5–36.5)
MCV RBC AUTO: 83 FL (ref 78–100)
MONOCYTES # BLD AUTO: 0.2 10E9/L (ref 0–1.3)
MONOCYTES NFR BLD AUTO: 6.2 %
NEUTROPHILS # BLD AUTO: 3.1 10E9/L (ref 1.6–8.3)
NEUTROPHILS NFR BLD AUTO: 80.3 %
NRBC # BLD AUTO: 0 10*3/UL
NRBC BLD AUTO-RTO: 0 /100
PLATELET # BLD AUTO: 201 10E9/L (ref 150–450)
POTASSIUM SERPL-SCNC: 4.4 MMOL/L (ref 3.4–5.3)
RBC # BLD AUTO: 4.81 10E12/L (ref 3.8–5.2)
SODIUM SERPL-SCNC: 138 MMOL/L (ref 133–144)
WBC # BLD AUTO: 3.9 10E9/L (ref 4–11)

## 2019-04-10 ASSESSMENT — ENCOUNTER SYMPTOMS
NERVOUS/ANXIOUS: 1
SEIZURES: 0
SPEECH CHANGE: 0
ARTHRALGIAS: 0
INSOMNIA: 1
MEMORY LOSS: 0
TREMORS: 1
PARALYSIS: 0
MUSCLE CRAMPS: 1
NUMBNESS: 0
HEADACHES: 0
PANIC: 0
NECK PAIN: 0
DIZZINESS: 1
LOSS OF CONSCIOUSNESS: 0
TINGLING: 1
MYALGIAS: 0
WEAKNESS: 1
MUSCLE WEAKNESS: 1
STIFFNESS: 1
DEPRESSION: 1
JOINT SWELLING: 0
BACK PAIN: 0
DISTURBANCES IN COORDINATION: 1
DECREASED CONCENTRATION: 0

## 2019-04-10 ASSESSMENT — PAIN SCALES - GENERAL
PAINLEVEL: NO PAIN (0)
PAINLEVEL: NO PAIN (0)

## 2019-04-10 ASSESSMENT — MIFFLIN-ST. JEOR: SCORE: 1199.8

## 2019-04-10 NOTE — NURSING NOTE
Chief Complaint   Patient presents with     New Patient     P NEW - MOVEMENT DISORDER     Sophie Zaidi

## 2019-04-10 NOTE — LETTER
4/10/2019       RE: Elizabeth Palma  [address]  [facility] [address]     Dear Colleague,    Thank you for referring your patient, Elizabeth Palma, to the [facility] NEUROLOGY at [facility]. Please see a copy of my visit note below.    Department of Neurology  Movement Disorders Division   Initial Clinic Evaluation     Patient: Elizabeth Palma   MRN: [id number]   : 1955   Date of Visit: 4/10/2019     Referring Physician: Issa    Reason for Referral:  Tremor and gait difficulties    Impression:  1.  Postural arm tremor.  Most likely cyclosporine-induced versus essential tremor  2.  Leg shaking and unsteadiness-cause uncertain    This patient had the acute onset of tremor and shaking along with gait unsteadiness and leg weakness.  As I examined her today she has what looks like a postural arm tremor that would be most consistent with either cyclosporine-induced or essential tremor.  Her mother did have a postural tremor.  I do not see tremor in the legs.  What I see is legs shaking associated with muscle contraction.  This usually indicates weakness.  Sudden leg weakness could be due to a posterior fossa stroke or to a cord lesion.  On examination however I do not find clear upper motor neuron signs.  She has a history of peripheral neuropathy but on examination this is minimal.    Recommendations:  1.  We will screen for stroke or cervical myelopathy with MRI scans of the brain and cervical cord.  2.  I will see the patient back after the above.      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  She comes with her .    She has a history of a cadaveric renal transplant for polycystic kidney disease.  She has good status at this time and is maintained on cyclosporine and  mycophenolate.  She  states that she has had long-time postural tremor of her hands.  She has diabetes mellitus type 2 with a history of peripheral neuropathy.  She has a history of anxiety and depression.    She states that 4-5 days ago she had the onset of severe tremors.  The tremors affected her entire body including her head torso arms and legs.  It had a very sudden onset.  There was no clear precipitant.  When she tried to stand however her legs were weak.  She felt like she was going to fall.  She almost fell many times.  She then used her walker and has been safe to ambulate since that time.  She is frustrated however because she cannot ambulate without her walker.  Her legs still feel somewhat weak but they have improved.  She really has no associated neurological signs.  She denies loss of consciousness or presyncope.  She had no convulsions.  She had no visual loss, diplopia, facial numbness, dysarthria, dysphagia or vertigo.  She had no limb paralysis.  She had more a sense of weakness in her legs.  She went to the emergency room.  She was noted to have tremors of her arms legs and tongue.  It was noted that she had distractibility of these tremors.  A CT scan of the head was done and I reviewed this.  The CT head scan looks entirely normal.    Over the subsequent days her tremor seemed to decrease in her legs became stronger.  She feels unsteady though and still cannot walk without a walker.  She saw Ms. Parsons believe is in the today and a neurological consultation was requested.    She is on no medications which would cause tremor or parkinsonism.  In terms of symptoms associated with Parkinson's disease she has no anosmia or micrographia.  She denies sensory loss in the legs.  She denies urinary incontinence.  She denies spine pain.         Past Medical History:   Past Medical History:   Diagnosis Date     Abnormal MRI, cervical spine 10/15/2011    4/2011; mild changes noted. Study done for left arm symptoms  Impression:  1. Mild multilevel degenerative disc disease with no significant canal or neural stenosis seen. motion artifact on the STIR images in these are not interpretable. The remaining images were interpreted      Autosomal dominant polycystic kidney disease 2011     (Problem list name updated by automated process. Provider to review and confirm.)     CMC DJD(carpometacarpal degenerative joint disease), localized primary 3/5/2013     -donor kidney transplant 3/20/2014     Depressive disorder 11/15/2012     DM type 2 (diabetes mellitus, type 2) (H) 2013     Encounter for long-term (current) use of other medications 2015     Family history of tremor 10/17/2011     Gastroesophageal reflux disease      Generalized anxiety disorder 11/15/2012     Glaucoma      Hyperlipidemia 10/15/2011     Hyperparathyroidism, secondary (H) 2015     Hypertension     resolved     Immunosuppressed status (H) 3/20/2014     Major depressive disorder, recurrent episode, moderate (H) 11/15/2012     Obesity (BMI 30-39.9)      OP (osteoporosis) T score -3.8 2009 T-score -3.7      LION (obstructive sleep apnoea) 10/15/2012    intol to cpa     Pain in joint, forearm -- L unhealed Fx 2013     Premature menopause age 37 7/10/2012    OCP (vaginal bldg)-->HT which she stopped 2 mo later documented at 2007 visit (age 51).      Restless leg syndrome      Rib fractures 2013     Sensory loss 10/17/2011    Bottom of feet; uncertain if there is a neuropathy per notes.       Stiffness of joint, not elsewhere classified, hand 3/5/2013     Tremor 10/15/2011    head     Uncomplicated asthma        Past Surgical History:   Past Surgical History:   Procedure Laterality Date     ABDOMEN SURGERY       ANKLE SURGERY       C TRANSPLANTATION OF KIDNEY  3/2014     C/SECTION, LOW TRANSVERSE      x 2     CHOLECYSTECTOMY       COLONOSCOPY       ESOPHAGOSCOPY, GASTROSCOPY, DUODENOSCOPY (EGD), COMBINED N/A  5/19/2015    Procedure: COMBINED ESOPHAGOSCOPY, GASTROSCOPY, DUODENOSCOPY (EGD);  Surgeon: Sky Davey MD;  Location:  GI     ESOPHAGOSCOPY, GASTROSCOPY, DUODENOSCOPY (EGD), COMBINED N/A 5/19/2015    Procedure: COMBINED ESOPHAGOSCOPY, GASTROSCOPY, DUODENOSCOPY (EGD), BIOPSY SINGLE OR MULTIPLE;  Surgeon: Sky Davey MD;  Location:  GI     EYE SURGERY       LAPAROSCOPY, SURGICAL; REPAIR INCISIONAL OR VENTRAL HERNIA       ORTHOPEDIC SURGERY       HERMINIA EN Y BOWEL  1990     WRIST SURGERY         Medications:  Current Outpatient Medications   Medication Sig Dispense Refill     acetaminophen (TYLENOL) 325 MG tablet Take 325-650 mg by mouth as needed       acetylcysteine (N-ACETYL-L-CYSTEINE) 600 MG CAPS capsule Take 2 400 mg caps two times daily for total daily dose of 800 mg 30 capsule      albuterol (PROAIR HFA/PROVENTIL HFA/VENTOLIN HFA) 108 (90 Base) MCG/ACT inhaler Inhale 2 puffs into the lungs every 6 hours as needed for shortness of breath / dyspnea or wheezing 1 Inhaler 5     ARIPiprazole (ABILIFY) 2 MG tablet Take 1 tablet (2 mg) by mouth daily 30 tablet 3     aspirin EC 81 MG EC tablet Take 1 tablet (81 mg) by mouth daily 30 tablet 5     blood glucose monitoring (ACCU-CHEK RALPH PLUS) meter device kit   0     blood glucose monitoring (NO BRAND SPECIFIED) meter device kit Use to test blood sugar 2 times daily or as directed. 1 kit 0     blood glucose monitoring (NO BRAND SPECIFIED) test strip Use to test blood sugars 2 times daily or as directed 100 strip 11     blood glucose monitoring (SOFTCLIX) lancets Use to test blood sugar 2 times daily or as directed. 1 Box 11     Cholecalciferol (VITAMIN D) 1000 UNITS capsule 2,000 Units  30 capsule      cyanocolbalamin (VITAMIN  B-12) 1000 MCG tablet Take 1 tablet by mouth daily. (Patient taking differently: Take 1,000 mcg by mouth every other day ) 30 tablet 0     cycloSPORINE modified (GENERIC EQUIVALENT) 25 MG capsule TAKE 5 CAPSULES (125MG) BY  MOUTH TWO TIMES A  capsule 6     cycloSPORINE modified (GENERIC EQUIVALENT) 25 MG capsule Take 5 capsules (125 mg) by mouth 2 times daily 300 capsule 6     econazole nitrate 1 % cream Apply topically daily 85 g 3     fluticasone (FLONASE) 50 MCG/ACT nasal spray Spray 1 spray into both nostrils daily *SHAKE LIQUID GENTLY 16 mL 2     furosemide (LASIX) 20 MG tablet Take 1 tablet (20 mg) by mouth daily 90 tablet 3     latanoprost (XALATAN) 0.005 % ophthalmic solution Place 1 drop into both eyes At Bedtime 7.5 mL 2     metFORMIN (GLUCOPHAGE-XR) 500 MG 24 hr tablet Take 3 tablets (1,500 mg) by mouth daily (with dinner) 270 tablet 1     mycophenolate (GENERIC EQUIVALENT) 250 MG capsule Take 4 capsules (1,000 mg) by mouth 2 times daily 240 capsule 11     naltrexone (DEPADE/REVIA) 50 MG tablet Take 1/2 tablet.  Time it one to two hours prior to worst cravings.  Then increase to one full tablet as instructed. 30 tablet 5     omeprazole (PRILOSEC) 40 MG capsule Take 1 capsule (40 mg) by mouth daily 90 capsule 3     ondansetron (ZOFRAN-ODT) 4 MG ODT tab Take 1 tablet (4 mg) by mouth every 6 hours as needed 20 tablet 3     order for DME Walker with front wheels and a seat. 1 Units 0     Polyvinyl Alcohol-Povidone (REFRESH OP) Apply to eye as needed Both eyes       prazosin (MINIPRESS) 5 MG capsule Take 3 capsules (15 mg) by mouth At Bedtime 90 capsule 3     simvastatin (ZOCOR) 20 MG tablet Take 1 tablet (20 mg) by mouth At Bedtime 90 tablet 3     sulfamethoxazole-trimethoprim (BACTRIM/SEPTRA) 400-80 MG per tablet Take 1 tablet by mouth daily 90 tablet 3     topiramate (TOPAMAX) 200 MG tablet Take 1 tablet (200 mg) by mouth 2 times daily 60 tablet 5     Vaginal Lubricant (REPLENS) GEL Use vaginally as needed. Can use up to 3 times per week. 35 g 11     vilazodone (VIIBRYD) 40 MG TABS tablet Take 1 tablet (40 mg) by mouth daily 30 tablet 3     Furosemide (LASIX) 20 MG tablet Take 1 tablet (20 mg) by mouth daily 30 tablet 0           Movement Disorder-related Medications                                                                                                                                                             Allergies: is allergic to percocet [oxycodone-acetaminophen] and novocain [procaine hcl].    Social History:   Social History     Socioeconomic History     Marital status:      Spouse name: Rahat     Number of children: 2     Years of education: None     Highest education level: None   Occupational History     Comment: part-time   Social Needs     Financial resource strain: None     Food insecurity:     Worry: None     Inability: None     Transportation needs:     Medical: None     Non-medical: None   Tobacco Use     Smoking status: Former Smoker     Smokeless tobacco: Never Used   Substance and Sexual Activity     Alcohol use: No     Alcohol/week: 0.0 oz     Drug use: No     Sexual activity: Yes     Partners: Male     Birth control/protection: None     Comment: 1 partner   Lifestyle     Physical activity:     Days per week: None     Minutes per session: None     Stress: None   Relationships     Social connections:     Talks on phone: None     Gets together: None     Attends Buddhism service: None     Active member of club or organization: None     Attends meetings of clubs or organizations: None     Relationship status: None     Intimate partner violence:     Fear of current or ex partner: None     Emotionally abused: None     Physically abused: None     Forced sexual activity: None   Other Topics Concern     Parent/sibling w/ CABG, MI or angioplasty before 65F 55M? Not Asked   Social History Narrative     None       Family History:  Family History   Problem Relation Age of Onset     Mental Illness Other         family hx     Heart Disease Other      Diabetes Other      Cancer Other      Genetic Disorder Father      Mental Illness Father      Diabetes Father      Hypertension Father      Hyperlipidemia  Mother      Diabetes Mother      Hypertension Mother      Mental Illness Other      Diabetes Other      Glaucoma No family hx of      Macular Degeneration No family hx of      Hypertension No family hx of        ROS:  General:  Fever : no, Chills: no, Sweats: no, Fatigue: no, ,Weight loss: YES  intentionalCardiovascular  Chest Pains: no, Palpitations: no, Edema: no, Shortness of breath: no  Respiratory  Cough: no  Gastrointestinal  Nausea: no, Vomiting: YES, Diarrhea: no, Constipation: no  Genitourinary  Dysuria: no, Frequency: no, Urgency: no,  Nocturia: no, Incontinence: no Women:  Abnormal vaginal bleeding: no  Musculoskeletal  Back pain: no, Neck Pain: no  Joint pain, swelling, redness: no, Stiffness: no  Skin  Rash: no, Itching: no,  Suspicious lesions: no  Psychiatric  Depression: no, Anxiety/Panic: no  Endocrine  Cold intolerance: no, Heat intolerance: no, Excessive thirst: no  Hematologic/LymphatiAbnormal bruising, bleeding: no ,Enlarged lymph nodes: {.:[id number]  Allergic/Immunologic  Hives (Urticaria): no, HIV exposure: no    Neurologic  Visual loss: no, Double vision: no, Headache: no, Loss of consciousness:  no, Seizure: no, Fainting (syncope): no, Dysarthria: no, Dysphagia:  no, Vertigo:  no, Weakness: no, Atrophy: no, Twitches: no, Lhermitte's: no, Numbness or tingling: no, Handwriting change: no, Tremors: no, Involuntary movements: no, Imbalance: no, Abnormal gait:  no, Falls :no, Memory loss: no, RBD: no, Sleep disorder: no, Hallucination: no, Loss of concentration: no,  Behavioral change: no,  Loss of motivation: no      Comprehensive Neurologic Exam    Mental Status Exam   The patient is alert, oriented and exhibits no difficulty with cognition or memory.  A formal short test of mental status was performed.     Neurovascular         Speech and Language   Right Left     Carotid Bruit Absent Absent  Speech is normal.   Dorsalis Pedis Pulse Normal Normal  Description   Posterior Tibial Pulse Normal  Normal  Language is normal.     Cranial Nerve Exam                 Right Left   Right Left   II                                        Normal Normal  Hearing (VIII) Normal Normal      III, IV, V!                   Normal Normal  Nystagmus (VIII) Normal Normal   EOM description                              Gag (IX, X) Normal Normal   Facial Sensation (V) Normal Normal  SCM (XI) Normal Normal   Muscles of Mastication (V) Normal Normal  Trapezius (XI) Normal Normal   Facial Strength (VII) Normal Normal  Tongue (XII) Normal Normal     Motor Exam  Strength (*/5 grading)  Muscle                  Right Left  Muscle Right Left   Frontalis                                           Normal Normal  Iliopsoas Normal    Normal          Orbicularis Oculi                     Normal Normal  Quadrideps Normal    Normal        Orbicularis Oris                         Normal Normal  Anterior Tibial Normal Normal      Deltoid Normal Normal  Gastrocnemius Normal    Normal   Biceps Normal Normal  Extensor Hallucis Longus Normal    Normal   Triceps Normal Normal  Toe Extensors Normal    Normal   EDC Normal Normal  Toe Flexor Normal    Normal   Finger Flexors Normal Normal  Other Normal Normal   First Dorsal Interosseous Normal Normal  Other Normal Normal   Hypothenar Normal Normal  Other Normal Normal   Thenar Normal Normal  Other Normal Normal   No weakness on manual muscle testing in any muscle.  Her legs shake with testing however.    Reflexes      Tone   Right Left   Right Left   Biceps Normal Normal  Spasticity (S)  Rigidity (R)     Triceps Normal Normal  Neck     Brachioradialis Normal Normal  Arm     Quadriceps Normal Normal  Leg     Ankle -4 -4       Babkinski Flexor Flexor         AMR       Coordination   Right Left   Right Left   Fingers Normal Normal  Finger nose finger Normal Normal   Hand Normal Normal  Drift Normal Normal   Leg Normal Normal  Heel Demarco Normal Normal   Foot Normal Normal  Other     Other                Involuntary Movements    Gait and Station  None            Right Left  Stand & Sit Normal   Postural tremor of hands +2 +2  Gait Normal   (Movement type) Normal Normal  Tandem Normal   (Movement type) Normal Normal  Romberg Absent   Walks normally behind a walker.  Without the walker she walks slowly but with no specific gait disorder.    Sensory Exam          Right Left    Pin Normal Normal    Vibration Normal Normal    Joint position Normal Normal    Other             Coding statement:     Duration of  Services: face-to-face 70min.   Greater than 50% of this visit was spent in counseling and coordination of care.    Again, thank you for allowing me to participate in the care of your patient.      Sincerely,    Javier Borja MD

## 2019-04-10 NOTE — TELEPHONE ENCOUNTER
RECORDS RECEIVED FROM: [facility]   DATE RECEIVED: 4/10/19   NOTES (FOR ALL VISITS) STATUS DETAILS   OFFICE NOTE from referring provider Internal 4/10/19   OFFICE NOTE from other specialist N/A    DISCHARGE SUMMARY from hospital N/A    DISCHARGE REPORT from the ER [facility]:  4/6/19   OPERATIVE REPORT N/A    MEDICATION LIST Internal    IMAGING  (FOR ALL VISITS)     EMG N/A    EEG N/A    ECT N/A    MRI (HEAD, NECK, SPINE) N/A    CT (HEAD, NECK, SPINE) [facility]:  CT Head 4/6/19     Phone Call:     Contact Name [facility]   Outcome Images will be pushed

## 2019-04-10 NOTE — PATIENT INSTRUCTIONS
[facility] Medication Refill Request Information:  * Please contact your pharmacy regarding ANY request for medication refills.  ** [facility] Prescription Fax = [phone]  * Please allow 3 business days for routine medication refills.  * Please allow 5 business days for controlled substance medication refills.     [facility] Test Result notification information:  *You will be notified with in 7-10 days of your appointment day regarding the results of your test.  If you are on MyChart you will be notified as soon as the provider has reviewed the results and signed off on them.    [facility]: [phone]

## 2019-04-10 NOTE — PATIENT INSTRUCTIONS
1.  We will check an MRI of your head and cervical spine to look for anything causing your gait problems  2.  I will see you back after this

## 2019-04-10 NOTE — PROGRESS NOTES
Department of Neurology  Movement Disorders Division   Initial Clinic Evaluation     Patient: Elizabeth Palma   MRN: [id number]   : 1955   Date of Visit: 4/10/2019     Referring Physician: Issa    Reason for Referral:  Tremor and gait difficulties    Impression:  1.  Postural arm tremor.  Most likely cyclosporine-induced versus essential tremor  2.  Leg shaking and unsteadiness-cause uncertain    This patient had the acute onset of tremor and shaking along with gait unsteadiness and leg weakness.  As I examined her today she has what looks like a postural arm tremor that would be most consistent with either cyclosporine-induced or essential tremor.  Her mother did have a postural tremor.  I do not see tremor in the legs.  What I see is legs shaking associated with muscle contraction.  This usually indicates weakness.  Sudden leg weakness could be due to a posterior fossa stroke or to a cord lesion.  On examination however I do not find clear upper motor neuron signs.  She has a history of peripheral neuropathy but on examination this is minimal.    Recommendations:  1.  We will screen for stroke or cervical myelopathy with MRI scans of the brain and cervical cord.  2.  I will see the patient back after the above.      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  She comes with her .    She has a history of a cadaveric renal transplant for polycystic kidney disease.  She has good status at this time and is maintained on cyclosporine and  mycophenolate.  She states that she has had long-time postural tremor of her hands.  She has diabetes mellitus type 2 with a history of peripheral neuropathy.  She has a history of anxiety and depression.    She states that 4-5 days ago she had the onset of severe tremors.  The tremors affected her entire body  including her head torso arms and legs.  It had a very sudden onset.  There was no clear precipitant.  When she tried to stand however her legs were weak.  She felt like she was going to fall.  She almost fell many times.  She then used her walker and has been safe to ambulate since that time.  She is frustrated however because she cannot ambulate without her walker.  Her legs still feel somewhat weak but they have improved.  She really has no associated neurological signs.  She denies loss of consciousness or presyncope.  She had no convulsions.  She had no visual loss, diplopia, facial numbness, dysarthria, dysphagia or vertigo.  She had no limb paralysis.  She had more a sense of weakness in her legs.  She went to the emergency room.  She was noted to have tremors of her arms legs and tongue.  It was noted that she had distractibility of these tremors.  A CT scan of the head was done and I reviewed this.  The CT head scan looks entirely normal.    Over the subsequent days her tremor seemed to decrease in her legs became stronger.  She feels unsteady though and still cannot walk without a walker.  She saw Ms. Parsons believe is in the today and a neurological consultation was requested.    She is on no medications which would cause tremor or parkinsonism.  In terms of symptoms associated with Parkinson's disease she has no anosmia or micrographia.  She denies sensory loss in the legs.  She denies urinary incontinence.  She denies spine pain.         Past Medical History:   Past Medical History:   Diagnosis Date     Abnormal MRI, cervical spine 10/15/2011    4/2011; mild changes noted. Study done for left arm symptoms Impression:  1. Mild multilevel degenerative disc disease with no significant canal or neural stenosis seen. motion artifact on the STIR images in these are not interpretable. The remaining images were interpreted      Autosomal dominant polycystic kidney disease 7/5/2011     (Problem list name  updated by automated process. Provider to review and confirm.)     CMC DJD(carpometacarpal degenerative joint disease), localized primary 3/5/2013     -donor kidney transplant 3/20/2014     Depressive disorder 11/15/2012     DM type 2 (diabetes mellitus, type 2) (H) 2013     Encounter for long-term (current) use of other medications 2015     Family history of tremor 10/17/2011     Gastroesophageal reflux disease      Generalized anxiety disorder 11/15/2012     Glaucoma      Hyperlipidemia 10/15/2011     Hyperparathyroidism, secondary (H) 2015     Hypertension     resolved     Immunosuppressed status (H) 3/20/2014     Major depressive disorder, recurrent episode, moderate (H) 11/15/2012     Obesity (BMI 30-39.9)      OP (osteoporosis) T score -3.8 2009 T-score -3.7      LION (obstructive sleep apnoea) 10/15/2012    intol to cpa     Pain in joint, forearm -- L unhealed Fx 2013     Premature menopause age 37 7/10/2012    OCP (vaginal bldg)-->HT which she stopped 2 mo later documented at 2007 visit (age 51).      Restless leg syndrome      Rib fractures 2013     Sensory loss 10/17/2011    Bottom of feet; uncertain if there is a neuropathy per notes.       Stiffness of joint, not elsewhere classified, hand 3/5/2013     Tremor 10/15/2011    head     Uncomplicated asthma        Past Surgical History:   Past Surgical History:   Procedure Laterality Date     ABDOMEN SURGERY       ANKLE SURGERY       C TRANSPLANTATION OF KIDNEY  3/2014     C/SECTION, LOW TRANSVERSE      x 2     CHOLECYSTECTOMY       COLONOSCOPY       ESOPHAGOSCOPY, GASTROSCOPY, DUODENOSCOPY (EGD), COMBINED N/A 2015    Procedure: COMBINED ESOPHAGOSCOPY, GASTROSCOPY, DUODENOSCOPY (EGD);  Surgeon: Sky Davey MD;  Location:  GI     ESOPHAGOSCOPY, GASTROSCOPY, DUODENOSCOPY (EGD), COMBINED N/A 2015    Procedure: COMBINED ESOPHAGOSCOPY, GASTROSCOPY, DUODENOSCOPY (EGD), BIOPSY SINGLE OR  MULTIPLE;  Surgeon: Sky Davey MD;  Location:  GI     EYE SURGERY       LAPAROSCOPY, SURGICAL; REPAIR INCISIONAL OR VENTRAL HERNIA       ORTHOPEDIC SURGERY       HERMINIA EN Y BOWEL  1990     WRIST SURGERY         Medications:  Current Outpatient Medications   Medication Sig Dispense Refill     acetaminophen (TYLENOL) 325 MG tablet Take 325-650 mg by mouth as needed       acetylcysteine (N-ACETYL-L-CYSTEINE) 600 MG CAPS capsule Take 2 400 mg caps two times daily for total daily dose of 800 mg 30 capsule      albuterol (PROAIR HFA/PROVENTIL HFA/VENTOLIN HFA) 108 (90 Base) MCG/ACT inhaler Inhale 2 puffs into the lungs every 6 hours as needed for shortness of breath / dyspnea or wheezing 1 Inhaler 5     ARIPiprazole (ABILIFY) 2 MG tablet Take 1 tablet (2 mg) by mouth daily 30 tablet 3     aspirin EC 81 MG EC tablet Take 1 tablet (81 mg) by mouth daily 30 tablet 5     blood glucose monitoring (ACCU-CHEK RALPH PLUS) meter device kit   0     blood glucose monitoring (NO BRAND SPECIFIED) meter device kit Use to test blood sugar 2 times daily or as directed. 1 kit 0     blood glucose monitoring (NO BRAND SPECIFIED) test strip Use to test blood sugars 2 times daily or as directed 100 strip 11     blood glucose monitoring (SOFTCLIX) lancets Use to test blood sugar 2 times daily or as directed. 1 Box 11     Cholecalciferol (VITAMIN D) 1000 UNITS capsule 2,000 Units  30 capsule      cyanocolbalamin (VITAMIN  B-12) 1000 MCG tablet Take 1 tablet by mouth daily. (Patient taking differently: Take 1,000 mcg by mouth every other day ) 30 tablet 0     cycloSPORINE modified (GENERIC EQUIVALENT) 25 MG capsule TAKE 5 CAPSULES (125MG) BY MOUTH TWO TIMES A  capsule 6     cycloSPORINE modified (GENERIC EQUIVALENT) 25 MG capsule Take 5 capsules (125 mg) by mouth 2 times daily 300 capsule 6     econazole nitrate 1 % cream Apply topically daily 85 g 3     fluticasone (FLONASE) 50 MCG/ACT nasal spray Spray 1 spray into both  nostrils daily *SHAKE LIQUID GENTLY 16 mL 2     furosemide (LASIX) 20 MG tablet Take 1 tablet (20 mg) by mouth daily 90 tablet 3     latanoprost (XALATAN) 0.005 % ophthalmic solution Place 1 drop into both eyes At Bedtime 7.5 mL 2     metFORMIN (GLUCOPHAGE-XR) 500 MG 24 hr tablet Take 3 tablets (1,500 mg) by mouth daily (with dinner) 270 tablet 1     mycophenolate (GENERIC EQUIVALENT) 250 MG capsule Take 4 capsules (1,000 mg) by mouth 2 times daily 240 capsule 11     naltrexone (DEPADE/REVIA) 50 MG tablet Take 1/2 tablet.  Time it one to two hours prior to worst cravings.  Then increase to one full tablet as instructed. 30 tablet 5     omeprazole (PRILOSEC) 40 MG capsule Take 1 capsule (40 mg) by mouth daily 90 capsule 3     ondansetron (ZOFRAN-ODT) 4 MG ODT tab Take 1 tablet (4 mg) by mouth every 6 hours as needed 20 tablet 3     order for DME Walker with front wheels and a seat. 1 Units 0     Polyvinyl Alcohol-Povidone (REFRESH OP) Apply to eye as needed Both eyes       prazosin (MINIPRESS) 5 MG capsule Take 3 capsules (15 mg) by mouth At Bedtime 90 capsule 3     simvastatin (ZOCOR) 20 MG tablet Take 1 tablet (20 mg) by mouth At Bedtime 90 tablet 3     sulfamethoxazole-trimethoprim (BACTRIM/SEPTRA) 400-80 MG per tablet Take 1 tablet by mouth daily 90 tablet 3     topiramate (TOPAMAX) 200 MG tablet Take 1 tablet (200 mg) by mouth 2 times daily 60 tablet 5     Vaginal Lubricant (REPLENS) GEL Use vaginally as needed. Can use up to 3 times per week. 35 g 11     vilazodone (VIIBRYD) 40 MG TABS tablet Take 1 tablet (40 mg) by mouth daily 30 tablet 3     Furosemide (LASIX) 20 MG tablet Take 1 tablet (20 mg) by mouth daily 30 tablet 0          Movement Disorder-related Medications                                                                                                                                                             Allergies: is allergic to percocet [oxycodone-acetaminophen] and novocain [procaine  hcl].    Social History:   Social History     Socioeconomic History     Marital status:      Spouse name: Rahat     Number of children: 2     Years of education: None     Highest education level: None   Occupational History     Comment: part-time   Social Needs     Financial resource strain: None     Food insecurity:     Worry: None     Inability: None     Transportation needs:     Medical: None     Non-medical: None   Tobacco Use     Smoking status: Former Smoker     Smokeless tobacco: Never Used   Substance and Sexual Activity     Alcohol use: No     Alcohol/week: 0.0 oz     Drug use: No     Sexual activity: Yes     Partners: Male     Birth control/protection: None     Comment: 1 partner   Lifestyle     Physical activity:     Days per week: None     Minutes per session: None     Stress: None   Relationships     Social connections:     Talks on phone: None     Gets together: None     Attends Confucianism service: None     Active member of club or organization: None     Attends meetings of clubs or organizations: None     Relationship status: None     Intimate partner violence:     Fear of current or ex partner: None     Emotionally abused: None     Physically abused: None     Forced sexual activity: None   Other Topics Concern     Parent/sibling w/ CABG, MI or angioplasty before 65F 55M? Not Asked   Social History Narrative     None       Family History:  Family History   Problem Relation Age of Onset     Mental Illness Other         family hx     Heart Disease Other      Diabetes Other      Cancer Other      Genetic Disorder Father      Mental Illness Father      Diabetes Father      Hypertension Father      Hyperlipidemia Mother      Diabetes Mother      Hypertension Mother      Mental Illness Other      Diabetes Other      Glaucoma No family hx of      Macular Degeneration No family hx of      Hypertension No family hx of        ROS:  General:  Fever : no, Chills: no, Sweats: no, Fatigue: no, ,Weight loss:  YES  intentionalCardiovascular  Chest Pains: no, Palpitations: no, Edema: no, Shortness of breath: no  Respiratory  Cough: no  Gastrointestinal  Nausea: no, Vomiting: YES, Diarrhea: no, Constipation: no  Genitourinary  Dysuria: no, Frequency: no, Urgency: no,  Nocturia: no, Incontinence: no Women:  Abnormal vaginal bleeding: no  Musculoskeletal  Back pain: no, Neck Pain: no  Joint pain, swelling, redness: no, Stiffness: no  Skin  Rash: no, Itching: no,  Suspicious lesions: no  Psychiatric  Depression: no, Anxiety/Panic: no  Endocrine  Cold intolerance: no, Heat intolerance: no, Excessive thirst: no  Hematologic/LymphatiAbnormal bruising, bleeding: no ,Enlarged lymph nodes: {[id number]  Allergic/Immunologic  Hives (Urticaria): no, HIV exposure: no    Neurologic  Visual loss: no, Double vision: no, Headache: no, Loss of consciousness:  no, Seizure: no, Fainting (syncope): no, Dysarthria: no, Dysphagia:  no, Vertigo:  no, Weakness: no, Atrophy: no, Twitches: no, Lhermitte's: no, Numbness or tingling: no, Handwriting change: no, Tremors: no, Involuntary movements: no, Imbalance: no, Abnormal gait:  no, Falls :no, Memory loss: no, RBD: no, Sleep disorder: no, Hallucination: no, Loss of concentration: no,  Behavioral change: no,  Loss of motivation: no      Comprehensive Neurologic Exam    Mental Status Exam   The patient is alert, oriented and exhibits no difficulty with cognition or memory.  A formal short test of mental status was performed.     Neurovascular         Speech and Language   Right Left     Carotid Bruit Absent Absent  Speech is normal.   Dorsalis Pedis Pulse Normal Normal  Description   Posterior Tibial Pulse Normal Normal  Language is normal.     Cranial Nerve Exam                 Right Left   Right Left   II                                        Normal Normal  Hearing (VIII) Normal Normal      III, IV, V!                   Normal Normal  Nystagmus (VIII) Normal Normal   EOM description                               Gag (IX, X) Normal Normal   Facial Sensation (V) Normal Normal  SCM (XI) Normal Normal   Muscles of Mastication (V) Normal Normal  Trapezius (XI) Normal Normal   Facial Strength (VII) Normal Normal  Tongue (XII) Normal Normal     Motor Exam  Strength (*/5 grading)  Muscle                  Right Left  Muscle Right Left   Frontalis                                           Normal Normal  Iliopsoas Normal    Normal          Orbicularis Oculi                     Normal Normal  Quadrideps Normal    Normal        Orbicularis Oris                         Normal Normal  Anterior Tibial Normal Normal      Deltoid Normal Normal  Gastrocnemius Normal    Normal   Biceps Normal Normal  Extensor Hallucis Longus Normal    Normal   Triceps Normal Normal  Toe Extensors Normal    Normal   EDC Normal Normal  Toe Flexor Normal    Normal   Finger Flexors Normal Normal  Other Normal Normal   First Dorsal Interosseous Normal Normal  Other Normal Normal   Hypothenar Normal Normal  Other Normal Normal   Thenar Normal Normal  Other Normal Normal   No weakness on manual muscle testing in any muscle.  Her legs shake with testing however.    Reflexes      Tone   Right Left   Right Left   Biceps Normal Normal  Spasticity (S)  Rigidity (R)     Triceps Normal Normal  Neck     Brachioradialis Normal Normal  Arm     Quadriceps Normal Normal  Leg     Ankle -4 -4       Babkinski Flexor Flexor         AMR       Coordination   Right Left   Right Left   Fingers Normal Normal  Finger nose finger Normal Normal   Hand Normal Normal  Drift Normal Normal   Leg Normal Normal  Heel Demarco Normal Normal   Foot Normal Normal  Other     Other               Involuntary Movements    Gait and Station  None            Right Left  Stand & Sit Normal   Postural tremor of hands +2 +2  Gait Normal   (Movement type) Normal Normal  Tandem Normal   (Movement type) Normal Normal  Romberg Absent   Walks normally behind a walker.  Without the walker she walks slowly but  with no specific gait disorder.    Sensory Exam          Right Left    Pin Normal Normal    Vibration Normal Normal    Joint position Normal Normal    Other             Coding statement:     Duration of  Services: face-to-face 70min.   Greater than 50% of this visit was spent in counseling and coordination of care.                Answers for HPI/ROS submitted by the patient on 4/10/2019   General Symptoms: No  Skin Symptoms: No  HENT Symptoms: No  EYE SYMPTOMS: No  HEART SYMPTOMS: No  LUNG SYMPTOMS: No  INTESTINAL SYMPTOMS: No  URINARY SYMPTOMS: No  GYNECOLOGIC SYMPTOMS: No  BREAST SYMPTOMS: No  SKELETAL SYMPTOMS: Yes  BLOOD SYMPTOMS: No  NERVOUS SYSTEM SYMPTOMS: Yes  MENTAL HEALTH SYMPTOMS: Yes  Back pain: No  Muscle aches: No  Neck pain: No  Swollen joints: No  Joint pain: No  Bone pain: No  Muscle cramps: Yes  Muscle weakness: Yes  Joint stiffness: Yes  Bone fracture: No  Trouble with coordination: Yes  Dizziness or trouble with balance: Yes  Fainting or black-out spells: No  Memory loss: No  Headache: No  Seizures: No  Speech problems: No  Tingling: Yes  Tremor: Yes  Weakness: Yes  Difficulty walking: Yes  Paralysis: No  Numbness: No  Nervous or Anxious: Yes  Depression: Yes  Trouble sleeping: Yes  Trouble thinking or concentrating: No  Mood changes: No  Panic attacks: No

## 2019-04-10 NOTE — PROGRESS NOTES
[facility]   Tammy LGERHARD Harry CNP  04/10/2019      Chief Complaint:   Hospital F/U and Anemia       History of Present Illness:   Elizabeth aPlma is a 63 year old female with a history of tremor, post-traumatic stress disorder, and depression who presents with her , Rahat, for evaluation of anemia and hospital follow-up.    ED course: She presented to the [facility] 4 days ago on 4/6/19 for worsening tremors. She had been having tremors for 3 days prior to presentation. After ED work up, it was though this was supratentorial. Head CT was negative for findings (results below). Her condition improved after given Ativan and fluids and she was discharged to home. She was told to follow-up with primary care for her anemia.    Disequilibrium: She does not think anything has changed since she was discharged aside from her tremor decreasing in severity. She reports that while she is walking she feels like she is going to fall. This feeling is exacerbated with changes in position. She does not have this feeling while sitting or lying down, and symptoms are not triggered by sitting up after lying down.  She endorses weakness in her arms and especially in her legs. This has been ongoing for about a week. She has been eating and drinking normally. She denies room-spinning dizziness. She denies headaches. She denies fever, chest pain or trouble breathing.  She denies nausea, vomiting, and diarrhea. She denies ear pain. She denies nasal congestion, sore throat, or coughing.   [facility]: per radiology read:   CT Head WO IV Cont (04/06/2019 4:06 PM)  COMPARISON:  None.    TECHNIQUE:  Images of the head were obtained without contrast.        FINDINGS:  Mild prominence of the CSF spaces in the frontal regions bilaterally, may reflect parenchymal volume loss. No clear evidence of subdural or epidural fluid collection. No evidence of acute intracranial hemorrhage, mass effect or  hydrocephalus.  Brain parenchyma, ventricles, sulci, and cisternal areas appear otherwise within normal limits.  Paranasal sinuses and mastoid air cells are clear.   Cranium appears intact.       IMPRESSION:  Mild parenchymal volume loss. No acute-appearing intracranial abnormality.      Other concerns discussed:  1. Anemia - labs reviewed     Review of Systems:   Pertinent items are noted in HPI, remainder of complete ROS is negative.      Active Medications:      acetaminophen (TYLENOL) 325 MG tablet, Take 325-650 mg by mouth as needed, Disp: , Rfl:      acetylcysteine (N-ACETYL-L-CYSTEINE) 600 MG CAPS capsule, Take 2 400 mg caps two times daily for total daily dose of 800 mg, Disp: 30 capsule, Rfl:      albuterol (PROAIR HFA/PROVENTIL HFA/VENTOLIN HFA) 108 (90 Base) MCG/ACT inhaler, Inhale 2 puffs into the lungs every 6 hours as needed for shortness of breath / dyspnea or wheezing, Disp: 1 Inhaler, Rfl: 5     ARIPiprazole (ABILIFY) 2 MG tablet, Take 1 tablet (2 mg) by mouth daily, Disp: 30 tablet, Rfl: 3     aspirin EC 81 MG EC tablet, Take 1 tablet (81 mg) by mouth daily, Disp: 30 tablet, Rfl: 5     Cholecalciferol (VITAMIN D) 1000 UNITS capsule, 2,000 Units , Disp: 30 capsule, Rfl:      cyanocolbalamin (VITAMIN  B-12) 1000 MCG tablet, Take 1 tablet by mouth daily. (Patient taking differently: Take 1,000 mcg by mouth every other day ), Disp: 30 tablet, Rfl: 0     cycloSPORINE modified (GENERIC EQUIVALENT) 25 MG capsule, TAKE 5 CAPSULES (125MG) BY MOUTH TWO TIMES A DAY, Disp: 300 capsule, Rfl: 6     cycloSPORINE modified (GENERIC EQUIVALENT) 25 MG capsule, Take 5 capsules (125 mg) by mouth 2 times daily, Disp: 300 capsule, Rfl: 6     econazole nitrate 1 % cream, Apply topically daily, Disp: 85 g, Rfl: 3     fluticasone (FLONASE) 50 MCG/ACT nasal spray, Spray 1 spray into both nostrils daily *SHAKE LIQUID GENTLY, Disp: 16 mL, Rfl: 2     furosemide (LASIX) 20 MG tablet, Take 1 tablet (20 mg) by mouth daily, Disp:  90 tablet, Rfl: 3     latanoprost (XALATAN) 0.005 % ophthalmic solution, Place 1 drop into both eyes At Bedtime, Disp: 7.5 mL, Rfl: 2     metFORMIN (GLUCOPHAGE-XR) 500 MG 24 hr tablet, Take 3 tablets (1,500 mg) by mouth daily (with dinner), Disp: 270 tablet, Rfl: 1     mycophenolate (GENERIC EQUIVALENT) 250 MG capsule, Take 4 capsules (1,000 mg) by mouth 2 times daily, Disp: 240 capsule, Rfl: 11     naltrexone (DEPADE/REVIA) 50 MG tablet, Take 1/2 tablet.  Time it one to two hours prior to worst cravings.  Then increase to one full tablet as instructed., Disp: 30 tablet, Rfl: 5     omeprazole (PRILOSEC) 40 MG capsule, Take 1 capsule (40 mg) by mouth daily, Disp: 90 capsule, Rfl: 3     ondansetron (ZOFRAN-ODT) 4 MG ODT tab, Take 1 tablet (4 mg) by mouth every 6 hours as needed, Disp: 20 tablet, Rfl: 3     order for DME, Walker with front wheels and a seat., Disp: 1 Units, Rfl: 0     Polyvinyl Alcohol-Povidone (REFRESH OP), Apply to eye as needed Both eyes, Disp: , Rfl:      prazosin (MINIPRESS) 5 MG capsule, Take 3 capsules (15 mg) by mouth At Bedtime, Disp: 90 capsule, Rfl: 3     simvastatin (ZOCOR) 20 MG tablet, Take 1 tablet (20 mg) by mouth At Bedtime, Disp: 90 tablet, Rfl: 3     sulfamethoxazole-trimethoprim (BACTRIM/SEPTRA) 400-80 MG per tablet, Take 1 tablet by mouth daily, Disp: 90 tablet, Rfl: 3     topiramate (TOPAMAX) 200 MG tablet, Take 1 tablet (200 mg) by mouth 2 times daily, Disp: 60 tablet, Rfl: 5     Vaginal Lubricant (REPLENS) GEL, Use vaginally as needed. Can use up to 3 times per week., Disp: 35 g, Rfl: 11     vilazodone (VIIBRYD) 40 MG TABS tablet, Take 1 tablet (40 mg) by mouth daily, Disp: 30 tablet, Rfl: 3     Furosemide (LASIX) 20 MG tablet, Take 1 tablet (20 mg) by mouth daily, Disp: 30 tablet, Rfl: 0      Allergies:   Percocet [oxycodone-acetaminophen]   Novocain [procaine hcl]      Past Medical History:  Autosomal dominant polycystic kidney disease  Carpometacarpal degenerative joint  disease  Depressive disorder  Diabetes mellitus type 2  Gastroesophageal reflux disease   Generalized anxiety disorder  Glaucoma  Hyperlipidemia  Secondary hyperparathyroidism  Hypertension   Immunosuppressed status  Obesity  Osteoporosis  Obstructive sleep apnea   Premature menopause - age 37  Restless leg syndrome  Sensory loss  Tremor   Uncomplicated asthma  Post-traumatic stress disorder  Narcissistic personality disorder  Median sensory neuropathy, left     Past Surgical History:  Abdomen surgery  Ankle surgery  Transplantation of kidney - 3/2014   section, low transverse (X2)   Cholecystectomy -   Eye surgery  Laparoscopy, surgical, repair incisional or ventral hernia  Vinnie en Y bowel -   Wrist surgery     Family History:   Mental illness - father  Heart disease - other  Diabetes - father, mother  Cancer - other  Genetic disorder - father  Hypertension - father, mother  Hyperlipidemia - mother  Glaucoma - other       Social History:   The patient is  with 2 children, a former smoker, and does not consume alcohol.      Physical Exam:   /74 (BP Location: Right arm, Patient Position: Sitting, Cuff Size: Adult Regular)   Pulse 85   Wt 70.4 kg (155 lb 3.2 oz)   LMP  (LMP Unknown)   BMI 29.31 kg/m     Constitutional: Alert, oriented, pleasant, no acute distress  Head: Normocephalic, atraumatic  Eyes: Extra-ocular movements intact, pupils equally round and reactive bilaterally, no scleral icterus  Ears: tympanic membranes pearly gray with positive light reflex  ENT: Oropharynx clear, moist mucus membranes, good dentition  Neck: Supple, no lymphadenopathy, no thyromegaly  Cardiovascular: Regular rate and rhythm, no murmurs, rubs or gallops  Respiratory: Good air movement bilaterally, lungs clear, no wheezes/rales/rhonchi  GI: Abdomen soft, bowel sounds present, nondistended, nontender, no organomegaly or masses, no rebound/guarding  Musculoskeletal: No edema, normal muscle  tone  Neurologic: Alert and oriented, cranial nerves 2-12 intact, strength 4/5 in lower extremities, ambulating with 4 wheel walker, balance intact, reflexes 2+ bilaterally, mild intention tremor  Skin: No rashes/lesions  Psychiatric: normal mentation, affect and mood      Assessment and Plan:  1. Disequilibrium  She has been having a feeling as though she is going to fall while walking for the past week. We will recheck labs today. Her CT scan done over the weekend was reviewed. I advised her to see neurology for further evaluation. In the meantime, I advised her to continue to use her walker and see physical therapy to work on her balance and fall prevention until she is able to get an appointment with neurology.   - NEUROLOGY ADULT REFERRAL  - PHYSICAL THERAPY REFERRAL    2. Tremor  Her labs were reviewed. We will recheck her blood counts today and if anemia worsened will work-up further as needed.   - CBC with platelets differential  - Basic metabolic panel  - NEUROLOGY ADULT REFERRAL    Follow-up: as needed        Scribe Disclosure:  I, Genevieve Alicia, am serving as a scribe to document services personally performed by GERHARD Epstein CNP at this visit, based upon the provider's statements to me. All documentation has been reviewed by the aforementioned provider prior to being entered into the official medical record.     Portions of this medical record were completed by a scribe. UPON MY REVIEW AND AUTHENTICATION BY ELECTRONIC SIGNATURE, this confirms (a) I performed the applicable clinical services, and (b) the record is accurate.     GERHARD Epstein CNP

## 2019-04-10 NOTE — NURSING NOTE
Chief Complaint   Patient presents with     Hospital F/U     pt here following an Urgent care visit     Anemia     pt states she was told she was anemic after lab work       Eileen He CMA at 9:53 AM on 4/10/2019.

## 2019-04-19 DIAGNOSIS — K21.9 GASTROESOPHAGEAL REFLUX DISEASE WITHOUT ESOPHAGITIS: ICD-10-CM

## 2019-04-19 RX ORDER — OMEPRAZOLE 40 MG/1
40 CAPSULE, DELAYED RELEASE ORAL DAILY
Qty: 90 CAPSULE | Refills: 3 | Status: SHIPPED | OUTPATIENT
Start: 2019-04-19 | End: 2020-04-28

## 2019-04-19 NOTE — TELEPHONE ENCOUNTER
Fax received from pharmacy requesting omeprazole refill. Medication refilled per protocol.    Molly Rodriguez RN

## 2019-04-22 ENCOUNTER — ANCILLARY PROCEDURE (OUTPATIENT)
Dept: MRI IMAGING | Facility: CLINIC | Age: 64
End: 2019-04-22
Attending: PSYCHIATRY & NEUROLOGY
Payer: MEDICARE

## 2019-04-22 ENCOUNTER — TELEPHONE (OUTPATIENT)
Dept: INTERNAL MEDICINE | Facility: CLINIC | Age: 64
End: 2019-04-22

## 2019-04-22 DIAGNOSIS — R26.9 ABNORMAL GAIT: ICD-10-CM

## 2019-04-22 PROBLEM — Z63.0 MARITAL CONFLICT: Status: ACTIVE | Noted: 2018-08-08

## 2019-04-22 PROBLEM — R45.851 SUICIDAL IDEATION: Status: ACTIVE | Noted: 2018-08-07

## 2019-04-22 NOTE — TELEPHONE ENCOUNTER
[facility] Call Center    Phone Message    May a detailed message be left on voicemail: yes    Reason for Call: Form or Letter   Type or form/letter needing completion: referral for treatment of pt legs (PT)  Provider: Hoogheem  Date form needed: asap  Once completed: Fax form to: [phone]     Provider states this referral already exists but they need a copy.      Action Taken: Message routed to:  [facility] BETTY

## 2019-04-23 ENCOUNTER — OFFICE VISIT (OUTPATIENT)
Dept: NEUROLOGY | Facility: CLINIC | Age: 64
End: 2019-04-23
Payer: MEDICARE

## 2019-04-23 ENCOUNTER — OFFICE VISIT (OUTPATIENT)
Dept: PSYCHIATRY | Facility: CLINIC | Age: 64
End: 2019-04-23
Payer: MEDICARE

## 2019-04-23 VITALS
WEIGHT: 155 LBS | SYSTOLIC BLOOD PRESSURE: 126 MMHG | DIASTOLIC BLOOD PRESSURE: 62 MMHG | BODY MASS INDEX: 29.49 KG/M2 | OXYGEN SATURATION: 98 % | HEART RATE: 59 BPM

## 2019-04-23 VITALS
HEART RATE: 88 BPM | TEMPERATURE: 98.2 F | DIASTOLIC BLOOD PRESSURE: 77 MMHG | WEIGHT: 157.6 LBS | BODY MASS INDEX: 29.99 KG/M2 | SYSTOLIC BLOOD PRESSURE: 150 MMHG

## 2019-04-23 DIAGNOSIS — F43.12 NIGHTMARES ASSOCIATED WITH CHRONIC POST-TRAUMATIC STRESS DISORDER: Primary | ICD-10-CM

## 2019-04-23 DIAGNOSIS — F51.5 NIGHTMARES ASSOCIATED WITH CHRONIC POST-TRAUMATIC STRESS DISORDER: Primary | ICD-10-CM

## 2019-04-23 DIAGNOSIS — R26.9 GAIT ABNORMALITY: Primary | ICD-10-CM

## 2019-04-23 RX ORDER — PRAZOSIN HYDROCHLORIDE 2 MG/1
CAPSULE ORAL
Qty: 30 CAPSULE | Refills: 3 | Status: SHIPPED | OUTPATIENT
Start: 2019-04-23 | End: 2019-07-16

## 2019-04-23 RX ORDER — PRAZOSIN HYDROCHLORIDE 5 MG/1
CAPSULE ORAL
Qty: 90 CAPSULE | Refills: 3 | Status: SHIPPED | OUTPATIENT
Start: 2019-04-23 | End: 2019-07-16

## 2019-04-23 ASSESSMENT — ENCOUNTER SYMPTOMS
DISTURBANCES IN COORDINATION: 1
TREMORS: 1
LOSS OF CONSCIOUSNESS: 0
MYALGIAS: 0
MEMORY LOSS: 0
PANIC: 0
BACK PAIN: 0
INSOMNIA: 1
JOINT SWELLING: 0
SEIZURES: 0
TINGLING: 0
SPEECH CHANGE: 0
DEPRESSION: 1
HEADACHES: 0
DIZZINESS: 1
MUSCLE CRAMPS: 1
NERVOUS/ANXIOUS: 1
MUSCLE WEAKNESS: 1
NUMBNESS: 0
WEAKNESS: 1
ARTHRALGIAS: 1
DECREASED CONCENTRATION: 0
NECK PAIN: 0
PARALYSIS: 0

## 2019-04-23 ASSESSMENT — PAIN SCALES - GENERAL
PAINLEVEL: MILD PAIN (2)
PAINLEVEL: MILD PAIN (2)

## 2019-04-23 ASSESSMENT — PATIENT HEALTH QUESTIONNAIRE - PHQ9: SUM OF ALL RESPONSES TO PHQ QUESTIONS 1-9: 5
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
## 2019-04-23 NOTE — PROGRESS NOTES
Movement Disorder Clinic follow up note    Patient: Elizabeth Palma  Medical Record Number: [id number]  Encounter Date: April 23, 2019  PCP:Horacio Sheridan    CC: Tremor disorder    Impression:  1.  Postural tremor, cyclosporine-induced versus essential tremor  2.  Gait disorder cause uncertain  3.  Normal MRI of head and cervical spine    Recommendations:  1.  I reassured the patient that we saw no signs of stroke, tumor or compressive lesion causing her sudden gait disorder.  I told her that her postural tremor was likely due to her cyclosporine and that I would not recommend changing her antirejection medicines on this basis at this time.  2.  I would recommend physical therapy for her gait problems.  I think some of her problem is deconditioning with muscle weakness in the legs.    Return to clinic: As needed    Interval Hx:: Ms. Elizabeth Palma returns to clinic today for follow-up of her tremor and gait disorder.  Given the sudden nature of the problem we did do an MRI of the brain and cervical spine to make sure there was no acute lesion.  I reviewed these scans with the patient.  They look normal for age.  The MRI shows retention cysts in the sphenoid sinus and right maxillary fluid.  Intracranially it shows only some small white matter lesions of no significance.  There is no stroke, hemorrhage or tumor causing her problems.  In the cervical spine there is minimal disc degeneration at C5-6 but no cord compression and no intramedullary lesions.  I reassured her about this.    On examination today she has some 2+ postural tremor but no signs of parkinsonism.  In the legs there is -1 weakness of the psoas muscles bilaterally but no other evidence of paralysis.  She is able to walk almost normally behind a walker.  Without the walker she tends to drag her legs slightly.    Current medications    Movement Disorder-related Medications                                                                                                                                                              Current Outpatient Medications   Medication     acetaminophen (TYLENOL) 325 MG tablet     acetylcysteine (N-ACETYL-L-CYSTEINE) 600 MG CAPS capsule     albuterol (PROAIR HFA/PROVENTIL HFA/VENTOLIN HFA) 108 (90 Base) MCG/ACT inhaler     ARIPiprazole (ABILIFY) 2 MG tablet     aspirin EC 81 MG EC tablet     blood glucose monitoring (ACCU-CHEK RALPH PLUS) meter device kit     blood glucose monitoring (NO BRAND SPECIFIED) meter device kit     blood glucose monitoring (NO BRAND SPECIFIED) test strip     blood glucose monitoring (SOFTCLIX) lancets     Cholecalciferol (VITAMIN D) 1000 UNITS capsule     cyanocolbalamin (VITAMIN  B-12) 1000 MCG tablet     cycloSPORINE modified (GENERIC EQUIVALENT) 25 MG capsule     cycloSPORINE modified (GENERIC EQUIVALENT) 25 MG capsule     econazole nitrate 1 % cream     fluticasone (FLONASE) 50 MCG/ACT nasal spray     furosemide (LASIX) 20 MG tablet     latanoprost (XALATAN) 0.005 % ophthalmic solution     metFORMIN (GLUCOPHAGE-XR) 500 MG 24 hr tablet     mycophenolate (GENERIC EQUIVALENT) 250 MG capsule     naltrexone (DEPADE/REVIA) 50 MG tablet     omeprazole (PRILOSEC) 40 MG DR capsule     ondansetron (ZOFRAN-ODT) 4 MG ODT tab     order for DME     Polyvinyl Alcohol-Povidone (REFRESH OP)     prazosin (MINIPRESS) 5 MG capsule     simvastatin (ZOCOR) 20 MG tablet     sulfamethoxazole-trimethoprim (BACTRIM/SEPTRA) 400-80 MG per tablet     topiramate (TOPAMAX) 200 MG tablet     Vaginal Lubricant (REPLENS) GEL     vilazodone (VIIBRYD) 40 MG TABS tablet     Furosemide (LASIX) 20 MG tablet     No current facility-administered medications for this visit.        Examination:  /62 (BP Location: Right arm, Patient Position: Chair, Cuff Size: Adult Regular)   Pulse 59   Wt 70.3 kg (155 lb)   LMP  (LMP Unknown)   SpO2 98%   BMI 29.49 kg/m    General- no distress, no rash, good pulses, no  edema  Respiratory-auscultation over the anterior lung fields is clear  Cardiac- regular rate and rhythm    Neurologic  Mental status  Patient is alert, appropriate, speech is fluent and comprehension is intact    Cranial nerve testing  Pupils are equal and reactive to light, visual fields are full to confrontation bilaterally  Extraocular movements are full  Facial sensation is intact, face is symmetric with rest and activation  Palate rises symmetrically, tongue protrudes at midline with normal movements  Sterocleidomastoid and trapezius strength is normal and symmetric    Motor  Bulk and tone are normal  Strength is 5/5 shoulder abduction, finger abduction, arm flexion and extension, hip  Psoas 4/4 flexion, plantar and dorsiflexion    Sensation  Intact to pin, vibration   Proprioception intact in great toes and fingers    Tendon reflexes  Testing at brachioradialis, biceps, triceps, patella and achilles bilaterally showed normal reflexes, symmetrically, without Babinski or Shields signs    Coordination  Finger nose finger testing: normalRapid alternating movements are normal.  Gait and Station: normal behind walker.  Drags feet without walker  Postural tremor 2+/2+  25 minutes of total time was spent face-to-face with the patient over 50% of which was counseling..    Answers for HPI/ROS submitted by the patient on 4/23/2019   General Symptoms: No  Skin Symptoms: No  HENT Symptoms: No  EYE SYMPTOMS: No  HEART SYMPTOMS: No  LUNG SYMPTOMS: No  INTESTINAL SYMPTOMS: No  URINARY SYMPTOMS: No  GYNECOLOGIC SYMPTOMS: No  BREAST SYMPTOMS: No  SKELETAL SYMPTOMS: Yes  BLOOD SYMPTOMS: No  NERVOUS SYSTEM SYMPTOMS: Yes  MENTAL HEALTH SYMPTOMS: Yes  Back pain: No  Muscle aches: No  Neck pain: No  Swollen joints: No  Joint pain: Yes  Bone pain: Yes  Muscle cramps: Yes  Muscle weakness: Yes  Trouble with coordination: Yes  Dizziness or trouble with balance: Yes  Fainting or black-out spells: No  Memory loss: No  Headache:  No  Seizures: No  Speech problems: No  Tingling: No  Tremor: Yes  Weakness: Yes  Difficulty walking: Yes  Paralysis: No  Numbness: No  Nervous or Anxious: Yes  Depression: Yes  Trouble sleeping: Yes  Trouble thinking or concentrating: No  Mood changes: Yes  Panic attacks: No

## 2019-04-23 NOTE — NURSING NOTE
Chief Complaint   Patient presents with     RECHECK     [facility] RETURN MOVEMENT DISORDER AFTER MRI       Devora Dc, EMT

## 2019-04-23 NOTE — LETTER
4/23/2019       RE: Elizabeth Palma  [address]  [facility] [id number]     Dear Colleague,    Thank you for referring your patient, Elizabeth Palma, to the [facility] at [facility]. Please see a copy of my visit note below.    Movement Disorder Clinic follow up note    Patient: Elizabeth Palma  Medical Record Number: [id number]  Encounter Date: April 23, 2019  PCP:Horacio Sheridan    CC: Tremor disorder    Impression:  1.  Postural tremor, cyclosporine-induced versus essential tremor  2.  Gait disorder cause uncertain  3.  Normal MRI of head and cervical spine    Recommendations:  1.  I reassured the patient that we saw no signs of stroke, tumor or compressive lesion causing her sudden gait disorder.  I told her that her postural tremor was likely due to her cyclosporine and that I would not recommend changing her antirejection medicines on this basis at this time.  2.  I would recommend physical therapy for her gait problems.  I think some of her problem is deconditioning with muscle weakness in the legs.    Return to clinic: As needed    Interval Hx:: Ms. Elizabeth Palma returns to clinic today for follow-up of her tremor and gait disorder.  Given the sudden nature of the problem we did do an MRI of the brain and cervical spine to make sure there was no acute lesion.  I reviewed these scans with the patient.  They look normal for age.  The MRI shows retention cysts in the sphenoid sinus and right maxillary fluid.  Intracranially it shows only some small white matter lesions of no significance.  There is no stroke, hemorrhage or tumor causing her problems.  In the cervical spine there is minimal disc degeneration at C5-6 but no cord compression and no intramedullary lesions.  I reassured her about this.    On examination today she has some 2+ postural tremor but no signs of parkinsonism.  In the legs there is -1 weakness of the psoas muscles  bilaterally but no other evidence of paralysis.  She is able to walk almost normally behind a walker.  Without the walker she tends to drag her legs slightly.    Current medications    Movement Disorder-related Medications                                                                                                                                                             Current Outpatient Medications   Medication     acetaminophen (TYLENOL) 325 MG tablet     acetylcysteine (N-ACETYL-L-CYSTEINE) 600 MG CAPS capsule     albuterol (PROAIR HFA/PROVENTIL HFA/VENTOLIN HFA) 108 (90 Base) MCG/ACT inhaler     ARIPiprazole (ABILIFY) 2 MG tablet     aspirin EC 81 MG EC tablet     blood glucose monitoring (ACCU-CHEK RALPH PLUS) meter device kit     blood glucose monitoring (NO BRAND SPECIFIED) meter device kit     blood glucose monitoring (NO BRAND SPECIFIED) test strip     blood glucose monitoring (SOFTCLIX) lancets     Cholecalciferol (VITAMIN D) 1000 UNITS capsule     cyanocolbalamin (VITAMIN  B-12) 1000 MCG tablet     cycloSPORINE modified (GENERIC EQUIVALENT) 25 MG capsule     cycloSPORINE modified (GENERIC EQUIVALENT) 25 MG capsule     econazole nitrate 1 % cream     fluticasone (FLONASE) 50 MCG/ACT nasal spray     furosemide (LASIX) 20 MG tablet     latanoprost (XALATAN) 0.005 % ophthalmic solution     metFORMIN (GLUCOPHAGE-XR) 500 MG 24 hr tablet     mycophenolate (GENERIC EQUIVALENT) 250 MG capsule     naltrexone (DEPADE/REVIA) 50 MG tablet     omeprazole (PRILOSEC) 40 MG DR capsule     ondansetron (ZOFRAN-ODT) 4 MG ODT tab     order for DME     Polyvinyl Alcohol-Povidone (REFRESH OP)     prazosin (MINIPRESS) 5 MG capsule     simvastatin (ZOCOR) 20 MG tablet     sulfamethoxazole-trimethoprim (BACTRIM/SEPTRA) 400-80 MG per tablet     topiramate (TOPAMAX) 200 MG tablet     Vaginal Lubricant (REPLENS) GEL     vilazodone (VIIBRYD) 40 MG TABS tablet     Furosemide (LASIX) 20 MG tablet     No current  facility-administered medications for this visit.        Examination:  /62 (BP Location: Right arm, Patient Position: Chair, Cuff Size: Adult Regular)   Pulse 59   Wt 70.3 kg (155 lb)   LMP  (LMP Unknown)   SpO2 98%   BMI 29.49 kg/m     General- no distress, no rash, good pulses, no edema  Respiratory-auscultation over the anterior lung fields is clear  Cardiac- regular rate and rhythm    Neurologic  Mental status  Patient is alert, appropriate, speech is fluent and comprehension is intact    Cranial nerve testing  Pupils are equal and reactive to light, visual fields are full to confrontation bilaterally  Extraocular movements are full  Facial sensation is intact, face is symmetric with rest and activation  Palate rises symmetrically, tongue protrudes at midline with normal movements  Sterocleidomastoid and trapezius strength is normal and symmetric    Motor  Bulk and tone are normal  Strength is 5/5 shoulder abduction, finger abduction, arm flexion and extension, hip  Psoas 4/4 flexion, plantar and dorsiflexion    Sensation  Intact to pin, vibration   Proprioception intact in great toes and fingers    Tendon reflexes  Testing at brachioradialis, biceps, triceps, patella and achilles bilaterally showed normal reflexes, symmetrically, without Babinski or Shields signs    Coordination  Finger nose finger testing: normalRapid alternating movements are normal.  Gait and Station: normal behind walker.  Drags feet without walker  Postural tremor 2+/2+  25 minutes of total time was spent face-to-face with the patient over 50% of which was counseling..    Again, thank you for allowing me to participate in the care of your patient.      Sincerely,    Javier Borja MD

## 2019-04-24 ENCOUNTER — OFFICE VISIT (OUTPATIENT)
Dept: INTERNAL MEDICINE | Facility: CLINIC | Age: 64
End: 2019-04-24
Payer: MEDICARE

## 2019-04-24 VITALS
OXYGEN SATURATION: 98 % | WEIGHT: 157 LBS | DIASTOLIC BLOOD PRESSURE: 64 MMHG | HEART RATE: 75 BPM | SYSTOLIC BLOOD PRESSURE: 103 MMHG | BODY MASS INDEX: 29.87 KG/M2

## 2019-04-24 DIAGNOSIS — E11.42 TYPE 2 DIABETES MELLITUS WITH PERIPHERAL NEUROPATHY (H): ICD-10-CM

## 2019-04-24 DIAGNOSIS — Z12.4 SCREENING FOR MALIGNANT NEOPLASM OF CERVIX: ICD-10-CM

## 2019-04-24 DIAGNOSIS — E11.42 TYPE 2 DIABETES MELLITUS WITH PERIPHERAL NEUROPATHY (H): Primary | ICD-10-CM

## 2019-04-24 LAB — HBA1C MFR BLD: 7.2 % (ref 0–5.6)

## 2019-04-24 ASSESSMENT — PAIN SCALES - GENERAL: PAINLEVEL: MILD PAIN (3)

## 2019-04-24 NOTE — NURSING NOTE
Chief Complaint   Patient presents with     Recheck Medication     Pt is here for a general follow up       Leonie Fraser, Clinic EMT at 11:03 AM on 4/24/2019

## 2019-04-24 NOTE — PROGRESS NOTES
ASSESSMENT/PLAN:  Preventative health - she needs shingles vaccine but there is a shortage of shingrix vaccine; she is seen in [facility] on 2017 and needs a pap smear now    Diabetes - needs A1c rechecked    Total time spent 15 minutes.  More than 50% of the time spent with Ms. Palma on counseling / coordinating her care    Horacio Sheridan MD, FACP      Chief complaint:  Elizabeth Palma is a 63 year old female presents for   Chief Complaint   Patient presents with     Recheck Medication     Pt is here for a general follow up        SUBJECTIVE:  She has a walker because dizzy and shaky.  The neurologist said the shaking was from the cyclosporine which of course she needs to stay on.  She is going to PT for balance and strengthening.  She does not want to use the walker but realizes she needs to do.  She has seen the hand surgeon to get her left side operated on 19 - she will do a preop within 30 days.    Glucoses have been high but has a bad machine.  She will get a new one because it won't register that there is blood there.  She is not fasting today.    Medications and allergies were reviewed by me today.       Patient Active Problem List    Diagnosis Date Noted     Type 2 diabetes mellitus with peripheral neuropathy (H) 2015     Priority: High     -donor kidney transplant 2014     Priority: High     Major depressive disorder, recurrent episode, moderate (H) 11/15/2012     Priority: High     OP (osteoporosis) T score -3.8 2009     Priority: High     2007 T-score -3.7       Median sensory neuropathy, left 2019     Priority: Medium     Nerve conduction study down at [facility] in [place] on 3/7/19 shows: mild left median neuropathy at wrist, left median motor distal latency is normal, the left ulnar motor conduction velocity is abnormally slowed       Personal history of other drug therapy 2018     Priority: Medium     Marital conflict 2018     Priority:  Medium     Suicidal ideation 08/07/2018     Priority: Medium     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     Priority: Medium     Age-related osteoporosis without current pathological fracture 08/10/2016     Priority: Medium     Right knee pain 02/08/2016     Priority: Medium     Left knee pain 02/08/2016     Priority: Medium     Posttraumatic stress disorder 11/24/2015     Priority: Medium     Nightmares associated with chronic post-traumatic stress disorder 10/27/2015     Priority: Medium     Pain in joint involving ankle and foot 07/13/2015     Priority: Medium     Senile osteoporosis 05/29/2015     Priority: Medium     Hyperparathyroidism (H) 02/26/2015     Priority: Medium     Problem list name updated by automated process. Provider to review       Hyperparathyroidism, secondary (H) 02/25/2015     Priority: Medium     Secondary hyperparathyroidism (H) 02/25/2015     Priority: Medium     Immunosuppressed status (H) 03/20/2014     Priority: Medium     Pain in joint, forearm -- L unhealed Fx 05/21/2013     Priority: Medium     CMC DJD(carpometacarpal degenerative joint disease), localized primary 03/05/2013     Priority: Medium     Generalized anxiety disorder 11/15/2012     Priority: Medium     Premature menopause age 37 07/10/2012     Priority: Medium     OCP (vaginal bldg)-->HT which she stopped 2 mo later documented at Jan 12, 2007 visit (age 51).       Sensory loss 10/17/2011     Priority: Medium     Bottom of feet; uncertain if there is a neuropathy per notes.        Hypertension 10/15/2011     Priority: Medium     Hyperlipidemia 10/15/2011     Priority: Medium     Abnormal MRI, cervical spine 10/15/2011     Priority: Medium     4/2011; mild changes noted. Study done for left arm symptoms  Impression:   1. Mild multilevel degenerative disc disease with no significant canal  or neural stenosis seen. motion artifact on the STIR images in these  are not interpretable. The remaining images were  interpreted       Autosomal dominant polycystic kidney disease 07/05/2011     Priority: Medium     Overview:   Overview:   (Problem list name updated by automated process. Provider to review and confirm.)         PHYSICAL EXAM:  /64   Pulse 75   Wt 71.2 kg (157 lb)   LMP  (LMP Unknown)   SpO2 98%   BMI 29.87 kg/m    Constitutional: no distress, comfortable, pleasant   Cardiovascular: regular rate and rhythm, normal S1 and S2, no murmurs  Neurological: fine resting tremor of both hands which she can not control

## 2019-04-24 NOTE — PATIENT INSTRUCTIONS
Primary Care Center Phone Number: [phone]   [facility] Center Medication Refill Request Information:  * Please contact your pharmacy regarding ANY request for medication refills.  ** [facility] Prescription Fax = [phone]  * Please allow 3 business days for routine medication refills.  * Please allow 5 business days for controlled substance medication refills.     [facility] Test Result notification information:  *You will be notified with in 7-10 days of your appointment day regarding the results of your test.  If you are on MyChart you will be notified as soon as the provider has reviewed the results and signed off on them.        Dear patient,    Your health care provider has recommended that you receive the new shingles vaccine  called Shingrix to prevent shingles. Many private pay insurance and Medicare Part D cover Shingrix. However, not all insurance carriers cover the entire cost of the Shingrix vaccine if the vaccine is administered at your primary care clinic.    Prior to receiving the vaccine, we recommend that you call your insurance carrier and  ask them the following questions:    1. Does my insurance cover the Shingrix vaccine and the administration of the  vaccine?  2. What is my co-pay or deductible for the vaccine?  3. Is there a cost difference if I receive the vaccine at my doctor s office or a  Pharmacy?    The clinic cannot determine your insurance benefits. Please call your insurance provider  prior to scheduling an appointment to receive the Shingrix vaccine. If you decide to  receive your vaccine at the Primary Care Center, please call [phone] and  request a nurse-only appointment for the Shingrix vaccine. The vaccine comes in two doses. Your second dose should be 2-6 months after your first Shingrix injections.  Nurse visit hours are available Monday, Wednesday, and Friday from 9-11:00 AM and 1:00-3:00PM.    Sincerely,    The Primary Care Team      Women's Health  [facility]  [phone]

## 2019-04-26 NOTE — PROGRESS NOTES
"Nutrition Reassessment  Reason For Visit:  Elizabeth Palma is a 63 year-old female with type 2 DM (oral med management) presenting today for nutrition follow-up, s/p \"gastric bypass in 1990 at [facility]\" per Pt report.  She is seeing medical weight management.  She was referred by Dr. Irene.  Note: Pt had a kidney transplant on March 20, 2014.    Pt was accompanied by her .     Anthropometrics:  Height: 61\"  Pre-op Weight: 265 lbs per Pt report; lowest weight after bariatric surgery: 165-170 lbs (25 years ago)  (Pt reported that she initially was at 215 lbs in 2015)    Current Weight: 159.6 lbs (+8.5 lbs in the past month)    Current Vitamins/Minerals: 3000 International Units vitamin D/day, Calcium 2x daily, vitamin C, vitamin B12 daily, B-complex  *Pt stated that she was told not to take other vitamins/minerals by MD.   3/27/19: Started Naltrexone at most recent MD appointment  4/30/19: patient reported that she is more hungry and is eating larger portions since medication change    Nutrition History:  Lactose intolerance ever since bariatric surgery.  Pt stated that she has osteoporosis; however, she stated that her doctors don't want her to take any vitamins or minerals due to her kidney transplant.    Diet/Nutrition Intake Hx:   On previous RD visits, pt reported her intake is affected by nausea 2/2 her anti-rejection medications.  Pt is also limited in protein choices that she likes - pt reports she does not take much dairy, does not like beans and lentils, keeps kosher. Pt also stated on previous visits that she will not record food intake due to the fact that every time she does this, she simply does not eat as she doesn't want to record.  She stated that her therapist strongly advises against recording food intake for this reason.     Physical Activity Note: She reported doing some walking in hallways and in hallways at work, but no structured exercise. At a previous RD visit reported that she has " a tendency to break bones so she is limited with what exercises she does.     Recent food recall:  Breakfast: 1/2 bagel with butter or LF cream cheese(motza during pass over and popovers)  Lunch: 1/2 sandwich(lean meat, cheese) vegetable with dip  Dinner: fish, potato, vegetables(larger portions)  Snacks: anything she can find typically a carbohydrate(more frequent)  Beverages: water, coffee     Progress with previous goals:  1. Eat 3 meals with lean protein at each meal, along with a non-starchy vegetable or whole fruit, up to 1/2 c carb at a meal. Met, try to include more protein    -Can try a protein drink for breakfast meal Such as [facility] drinks or Integrated supplement (no sugar substitute and has a small sized container to try), pure protein, muscle. or protein water ([facility] clear protein drink or Bipro or Premier clear). Can add protein powder into coffee for example.  2. If needing a snack, have vegetables or protein snack (give at least 2 hours between a meals and a snack). More carbohydrate based   3. Walk 10 min daily, increasing as able. Starts physical therapy next week - feels more weak after stopping Topiramate    4. Continue working on Meal planning for all meals met, using instant pot      Nutrition Prescription:   Grams Protein: 60 (minimum) - Not meeting consistently.   Amount of Fluid: 48-64 oz    Nutrition Diagnosis  Current: Overweight related to history of excessive energy intake, variable eating habits/intake and lack of sufficient exercise as evidenced by pt report and BMI > 25. continues    Intervention  Intervention At Appointment:  Materials/Education provided: Reviewed her progress towards previous goals.  Patient noted that she did make some poor choices during passover eating more calorically dense foods.  She reported that since changing her appetite suppressant medication she is more hungry and is eating larger portions and snacking more often on carbohydrates.  She stated she  does not feel full from meals for very long, encouraged eating more protein at meals.  She noted that she has felt more weak recently and plans to start physical therapy.  Gave encouragement and support.    Patient Understanding: good  Expected Compliance: good with continued RD follow up.     Goals:   1. Eat 3 meals with lean protein at each meal, along with a non-starchy vegetable or whole fruit, up to 1/2 c carb at a meal.   -Can try a protein drink for breakfast meal Such as [facility] drinks or Integrated supplement (no sugar substitute and has a small sized container to try), pure protein, muscle. or protein water ([facility] clear protein drink or Bipro or Premier clear). Can add protein powder into coffee for example.  2. If needing a snack, have vegetables or protein snack (give at least 2 hours between a meals and a snack).  3. Walk 10 min daily, increasing as able. (Starting physical therapy)  4. Continue working on Meal planning for all meals.    Follow-Up: 1 month or PRN     Time spent with patient: 30 minutes.  Leyla Guerrero, RD, LD

## 2019-04-29 ENCOUNTER — OFFICE VISIT (OUTPATIENT)
Dept: PODIATRY | Facility: CLINIC | Age: 64
End: 2019-04-29
Payer: MEDICARE

## 2019-04-29 VITALS — DIASTOLIC BLOOD PRESSURE: 78 MMHG | OXYGEN SATURATION: 98 % | SYSTOLIC BLOOD PRESSURE: 136 MMHG | HEART RATE: 63 BPM

## 2019-04-29 DIAGNOSIS — L60.2 ONYCHAUXIS: Primary | ICD-10-CM

## 2019-04-29 DIAGNOSIS — E11.49 TYPE II OR UNSPECIFIED TYPE DIABETES MELLITUS WITH NEUROLOGICAL MANIFESTATIONS, NOT STATED AS UNCONTROLLED(250.60) (H): ICD-10-CM

## 2019-04-29 DIAGNOSIS — M20.42 HAMMER TOES OF BOTH FEET: ICD-10-CM

## 2019-04-29 DIAGNOSIS — M20.41 HAMMER TOES OF BOTH FEET: ICD-10-CM

## 2019-04-29 PROCEDURE — 99213 OFFICE O/P EST LOW 20 MIN: CPT | Performed by: PODIATRIST

## 2019-04-29 NOTE — LETTER
2019         RE: Elizabeth Palma  [address]  [facility] [address]        Dear Colleague,    Thank you for referring your patient, Elizabeth Palma, to the [facility]. Please see a copy of my visit note below.    Past Medical History:   Diagnosis Date     Abnormal MRI, cervical spine 10/15/2011    2011; mild changes noted. Study done for left arm symptoms Impression:  1. Mild multilevel degenerative disc disease with no significant canal or neural stenosis seen. motion artifact on the STIR images in these are not interpretable. The remaining images were interpreted      Autosomal dominant polycystic kidney disease 2011     (Problem list name updated by automated process. Provider to review and confirm.)     CMC DJD(carpometacarpal degenerative joint disease), localized primary 3/5/2013     -donor kidney transplant 3/20/2014     Depressive disorder 11/15/2012     DM type 2 (diabetes mellitus, type 2) (H) 2013     Encounter for long-term (current) use of other medications 2015     Family history of tremor 10/17/2011     Gastroesophageal reflux disease      Generalized anxiety disorder 11/15/2012     Glaucoma      Hyperlipidemia 10/15/2011     Hyperparathyroidism, secondary (H) 2015     Hypertension     resolved     Immunosuppressed status (H) 3/20/2014     Major depressive disorder, recurrent episode, moderate (H) 11/15/2012     Obesity (BMI 30-39.9)      OP (osteoporosis) T score -3.8 2009 T-score -3.7      LION (obstructive sleep apnoea) 10/15/2012    intol to cpa     Pain in joint, forearm -- L unhealed Fx 2013     Premature menopause age 37 7/10/2012    OCP (vaginal bldg)-->HT which she stopped 2 mo later documented at 2007 visit (age 51).      Restless leg syndrome      Rib fractures 2013     Sensory loss 10/17/2011    Bottom of feet; uncertain if there is a neuropathy per notes.       Stiffness of joint,  not elsewhere classified, hand 3/5/2013     Tremor 10/15/2011    head     Uncomplicated asthma      Patient Active Problem List   Diagnosis     Hypertension     Hyperlipidemia     Abnormal MRI, cervical spine     Sensory loss     Premature menopause age 37     OP (osteoporosis) T score -3.8     Major depressive disorder, recurrent episode, moderate (H)     Generalized anxiety disorder     CMC DJD(carpometacarpal degenerative joint disease), localized primary     Pain in joint, forearm -- L unhealed Fx     -donor kidney transplant     Immunosuppressed status (H)     Hyperparathyroidism, secondary (H)     Hyperparathyroidism (H)     Senile osteoporosis     Pain in joint involving ankle and foot     Nightmares associated with chronic post-traumatic stress disorder     Type 2 diabetes mellitus with peripheral neuropathy (H)     Posttraumatic stress disorder     Right knee pain     Left knee pain     Age-related osteoporosis without current pathological fracture     Narcissistic personality disorder (H)     Personal history of other drug therapy     Median sensory neuropathy, left     Marital conflict     Suicidal ideation     Autosomal dominant polycystic kidney disease     Secondary hyperparathyroidism (H)     Past Surgical History:   Procedure Laterality Date     ABDOMEN SURGERY       ANKLE SURGERY       C TRANSPLANTATION OF KIDNEY  3/2014     C/SECTION, LOW TRANSVERSE      x 2     CHOLECYSTECTOMY       COLONOSCOPY       ESOPHAGOSCOPY, GASTROSCOPY, DUODENOSCOPY (EGD), COMBINED N/A 2015    Procedure: COMBINED ESOPHAGOSCOPY, GASTROSCOPY, DUODENOSCOPY (EGD);  Surgeon: Sky Davey MD;  Location:  GI     ESOPHAGOSCOPY, GASTROSCOPY, DUODENOSCOPY (EGD), COMBINED N/A 2015    Procedure: COMBINED ESOPHAGOSCOPY, GASTROSCOPY, DUODENOSCOPY (EGD), BIOPSY SINGLE OR MULTIPLE;  Surgeon: Sky Davey MD;  Location:  GI     EYE SURGERY       LAPAROSCOPY, SURGICAL; REPAIR INCISIONAL OR VENTRAL  HERNIA       ORTHOPEDIC SURGERY       HERMINIA EN Y BOWEL  1990     WRIST SURGERY       Social History     Socioeconomic History     Marital status:      Spouse name: Rahat     Number of children: 2     Years of education: Not on file     Highest education level: Not on file   Occupational History     Comment: part-time   Social Needs     Financial resource strain: Not on file     Food insecurity:     Worry: Not on file     Inability: Not on file     Transportation needs:     Medical: Not on file     Non-medical: Not on file   Tobacco Use     Smoking status: Former Smoker     Smokeless tobacco: Never Used   Substance and Sexual Activity     Alcohol use: No     Alcohol/week: 0.0 oz     Drug use: No     Sexual activity: Yes     Partners: Male     Birth control/protection: None     Comment: 1 partner   Lifestyle     Physical activity:     Days per week: Not on file     Minutes per session: Not on file     Stress: Not on file   Relationships     Social connections:     Talks on phone: Not on file     Gets together: Not on file     Attends Caodaism service: Not on file     Active member of club or organization: Not on file     Attends meetings of clubs or organizations: Not on file     Relationship status: Not on file     Intimate partner violence:     Fear of current or ex partner: Not on file     Emotionally abused: Not on file     Physically abused: Not on file     Forced sexual activity: Not on file   Other Topics Concern     Parent/sibling w/ CABG, MI or angioplasty before 65F 55M? Not Asked   Social History Narrative     Not on file     Family History   Problem Relation Age of Onset     Mental Illness Other         family hx     Heart Disease Other      Diabetes Other      Cancer Other      Genetic Disorder Father      Mental Illness Father      Diabetes Father      Hypertension Father      Hyperlipidemia Mother      Diabetes Mother      Hypertension Mother      Mental Illness Other      Diabetes Other       Glaucoma No family hx of      Macular Degeneration No family hx of      Hypertension No family hx of      SUBJECTIVE FINDINGS:  A 63-year-old female returns to clinic for onychauxis and diabetic foot check.  Relates numbness and tingling, no ulcers or sores since I have seen her last.  She relates she is wearing her diabetic shoes and needs new prescription.        OBJECTIVE FINDINGS:  DP and PT are 2/4 bilaterally.  CFTs are less than 3 seconds bilaterally.  She has incurvated nails with some dystrophy and subungual debris bilaterally 1-5.  She has dorsally contracted digits 1-5 bilaterally.  There is no erythema, no drainage, no odor, no calor bilaterally.  There is dry scaly skin bilaterally.       ASSESSMENT AND PLAN:  Onychauxis bilaterally.  She is diabetic with peripheral neuropathy and Hammertoes.  Diagnosis and treatment options were discussed with the patient.  All of the nails were reduced bilaterally upon consent.  She was advised on lotion use.  Prescription for Diabetic shoes and orthotics given and she was given the phone number and address for Orthotics and Prosthetics lab.  Return to clinic and see me in about 3 months.      Again, thank you for allowing me to participate in the care of your patient.        Sincerely,        Javier Tuttle DPM

## 2019-04-29 NOTE — NURSING NOTE
Elizabeth Palma's chief complaint for this visit includes:  Chief Complaint   Patient presents with     Right Foot - Follow Up     Left Foot - Follow Up     No changes since last visit per patient.     PCP: Horacio Sheridan    Referring Provider:  No referring provider defined for this encounter.    /78 (BP Location: Left arm, Patient Position: Sitting, Cuff Size: Adult Regular)   Pulse 63   LMP  (LMP Unknown)   SpO2 98%   Data Unavailable     Do you need any medication refills at today's visit? No.  Shazia Ruano RN

## 2019-04-29 NOTE — PROGRESS NOTES
Past Medical History:   Diagnosis Date     Abnormal MRI, cervical spine 10/15/2011    2011; mild changes noted. Study done for left arm symptoms Impression:  1. Mild multilevel degenerative disc disease with no significant canal or neural stenosis seen. motion artifact on the STIR images in these are not interpretable. The remaining images were interpreted      Autosomal dominant polycystic kidney disease 2011     (Problem list name updated by automated process. Provider to review and confirm.)     CMC DJD(carpometacarpal degenerative joint disease), localized primary 3/5/2013     -donor kidney transplant 3/20/2014     Depressive disorder 11/15/2012     DM type 2 (diabetes mellitus, type 2) (H) 2013     Encounter for long-term (current) use of other medications 2015     Family history of tremor 10/17/2011     Gastroesophageal reflux disease      Generalized anxiety disorder 11/15/2012     Glaucoma      Hyperlipidemia 10/15/2011     Hyperparathyroidism, secondary (H) 2015     Hypertension     resolved     Immunosuppressed status (H) 3/20/2014     Major depressive disorder, recurrent episode, moderate (H) 11/15/2012     Obesity (BMI 30-39.9)      OP (osteoporosis) T score -3.8 2009 T-score -3.7      LION (obstructive sleep apnoea) 10/15/2012    intol to cpa     Pain in joint, forearm -- L unhealed Fx 2013     Premature menopause age 37 7/10/2012    OCP (vaginal bldg)-->HT which she stopped 2 mo later documented at 2007 visit (age 51).      Restless leg syndrome      Rib fractures 2013     Sensory loss 10/17/2011    Bottom of feet; uncertain if there is a neuropathy per notes.       Stiffness of joint, not elsewhere classified, hand 3/5/2013     Tremor 10/15/2011    head     Uncomplicated asthma      Patient Active Problem List   Diagnosis     Hypertension     Hyperlipidemia     Abnormal MRI, cervical spine     Sensory loss     Premature menopause age 37      OP (osteoporosis) T score -3.8     Major depressive disorder, recurrent episode, moderate (H)     Generalized anxiety disorder     CMC DJD(carpometacarpal degenerative joint disease), localized primary     Pain in joint, forearm -- L unhealed Fx     -donor kidney transplant     Immunosuppressed status (H)     Hyperparathyroidism, secondary (H)     Hyperparathyroidism (H)     Senile osteoporosis     Pain in joint involving ankle and foot     Nightmares associated with chronic post-traumatic stress disorder     Type 2 diabetes mellitus with peripheral neuropathy (H)     Posttraumatic stress disorder     Right knee pain     Left knee pain     Age-related osteoporosis without current pathological fracture     Narcissistic personality disorder (H)     Personal history of other drug therapy     Median sensory neuropathy, left     Marital conflict     Suicidal ideation     Autosomal dominant polycystic kidney disease     Secondary hyperparathyroidism (H)     Past Surgical History:   Procedure Laterality Date     ABDOMEN SURGERY       ANKLE SURGERY       C TRANSPLANTATION OF KIDNEY  3/2014     C/SECTION, LOW TRANSVERSE      x 2     CHOLECYSTECTOMY       COLONOSCOPY       ESOPHAGOSCOPY, GASTROSCOPY, DUODENOSCOPY (EGD), COMBINED N/A 2015    Procedure: COMBINED ESOPHAGOSCOPY, GASTROSCOPY, DUODENOSCOPY (EGD);  Surgeon: Sky Davey MD;  Location:  GI     ESOPHAGOSCOPY, GASTROSCOPY, DUODENOSCOPY (EGD), COMBINED N/A 2015    Procedure: COMBINED ESOPHAGOSCOPY, GASTROSCOPY, DUODENOSCOPY (EGD), BIOPSY SINGLE OR MULTIPLE;  Surgeon: Sky Davey MD;  Location:  GI     EYE SURGERY       LAPAROSCOPY, SURGICAL; REPAIR INCISIONAL OR VENTRAL HERNIA       ORTHOPEDIC SURGERY       HERMINIA EN Y BOWEL       WRIST SURGERY       Social History     Socioeconomic History     Marital status:      Spouse name: Rahat     Number of children: 2     Years of education: Not on file     Highest education  level: Not on file   Occupational History     Comment: part-time   Social Needs     Financial resource strain: Not on file     Food insecurity:     Worry: Not on file     Inability: Not on file     Transportation needs:     Medical: Not on file     Non-medical: Not on file   Tobacco Use     Smoking status: Former Smoker     Smokeless tobacco: Never Used   Substance and Sexual Activity     Alcohol use: No     Alcohol/week: 0.0 oz     Drug use: No     Sexual activity: Yes     Partners: Male     Birth control/protection: None     Comment: 1 partner   Lifestyle     Physical activity:     Days per week: Not on file     Minutes per session: Not on file     Stress: Not on file   Relationships     Social connections:     Talks on phone: Not on file     Gets together: Not on file     Attends Mandaeism service: Not on file     Active member of club or organization: Not on file     Attends meetings of clubs or organizations: Not on file     Relationship status: Not on file     Intimate partner violence:     Fear of current or ex partner: Not on file     Emotionally abused: Not on file     Physically abused: Not on file     Forced sexual activity: Not on file   Other Topics Concern     Parent/sibling w/ CABG, MI or angioplasty before 65F 55M? Not Asked   Social History Narrative     Not on file     Family History   Problem Relation Age of Onset     Mental Illness Other         family hx     Heart Disease Other      Diabetes Other      Cancer Other      Genetic Disorder Father      Mental Illness Father      Diabetes Father      Hypertension Father      Hyperlipidemia Mother      Diabetes Mother      Hypertension Mother      Mental Illness Other      Diabetes Other      Glaucoma No family hx of      Macular Degeneration No family hx of      Hypertension No family hx of      SUBJECTIVE FINDINGS:  A 63-year-old female returns to clinic for onychauxis and diabetic foot check.  Relates numbness and tingling, no ulcers or sores since  I have seen her last.  She relates she is wearing her diabetic shoes and needs new prescription.        OBJECTIVE FINDINGS:  DP and PT are 2/4 bilaterally.  CFTs are less than 3 seconds bilaterally.  She has incurvated nails with some dystrophy and subungual debris bilaterally 1-5.  She has dorsally contracted digits 1-5 bilaterally.  There is no erythema, no drainage, no odor, no calor bilaterally.  There is dry scaly skin bilaterally.       ASSESSMENT AND PLAN:  Onychauxis bilaterally.  She is diabetic with peripheral neuropathy and Hammertoes.  Diagnosis and treatment options were discussed with the patient.  All of the nails were reduced bilaterally upon consent.  She was advised on lotion use.  Prescription for Diabetic shoes and orthotics given and she was given the phone number and address for Orthotics and Prosthetics lab.  Return to clinic and see me in about 3 months.

## 2019-04-30 ENCOUNTER — ALLIED HEALTH/NURSE VISIT (OUTPATIENT)
Dept: SURGERY | Facility: CLINIC | Age: 64
End: 2019-04-30
Payer: MEDICARE

## 2019-04-30 VITALS — WEIGHT: 159.6 LBS | BODY MASS INDEX: 30.37 KG/M2

## 2019-04-30 NOTE — PATIENT INSTRUCTIONS
Goals:   1. Eat 3 meals with lean protein at each meal, along with a non-starchy vegetable or whole fruit, up to 1/2 c carb at a meal.   -Can try a protein drink for breakfast meal Such as [facility] drinks or Integrated supplement (no sugar substitute and has a small sized container to try), pure protein, muscle. or protein water ([facility] clear protein drink or Bipro or Premier clear). Can add protein powder into coffee for example.  2. If needing a snack, have vegetables or protein snack (give at least 2 hours between a meals and a snack).  3. Walk 10 min daily, increasing as able. (Starting physical therapy)  4. Continue working on Meal planning for all meals.    Follow up with RD in one month    Leyla Guerrero RD, LD  If you need to schedule or reschedule with a dietitian please call [phone].

## 2019-05-01 DIAGNOSIS — E66.01 MORBID OBESITY (H): ICD-10-CM

## 2019-05-01 NOTE — TELEPHONE ENCOUNTER
Patient has refill remaining. Pharmacy was called and confirms 4 remaining refills however she just filled on 4 16 and only has 4 tabs left. Patient was called and she states she is taking 1 tab twice daily. Will route to clinic to either increase qty dispensed or discuss with pt how many she should be taking a day.      Kathleen M Doege RN

## 2019-05-01 NOTE — TELEPHONE ENCOUNTER
Reason for call:  Medication   If this is a refill request, has the caller requested the refill from the pharmacy already? No  Will the patient be using a [facility]? No  Name of the pharmacy and phone number for the current request: [facility] [phone]    Name of the medication requested: NALTRAXONE    Other request: PT IS RUNNING OUT OF HER RX    Phone number to reach patient:  Cell number on file:    Telephone Information:   Mobile [phone]       Best Time:  ANY    Can we leave a detailed message on this number?  YES

## 2019-05-02 ENCOUNTER — HOSPITAL ENCOUNTER (OUTPATIENT)
Dept: PHYSICAL THERAPY | Facility: CLINIC | Age: 64
Setting detail: THERAPIES SERIES
End: 2019-05-02
Attending: PSYCHIATRY & NEUROLOGY
Payer: MEDICARE

## 2019-05-02 DIAGNOSIS — R26.9 GAIT ABNORMALITY: ICD-10-CM

## 2019-05-02 PROCEDURE — 97162 PT EVAL MOD COMPLEX 30 MIN: CPT | Mod: GP | Performed by: PHYSICAL THERAPIST

## 2019-05-02 PROCEDURE — 97110 THERAPEUTIC EXERCISES: CPT | Mod: GP | Performed by: PHYSICAL THERAPIST

## 2019-05-02 RX ORDER — NALTREXONE HYDROCHLORIDE 50 MG/1
50 TABLET, FILM COATED ORAL 2 TIMES DAILY
Qty: 60 TABLET | Refills: 5 | Status: SHIPPED | OUTPATIENT
Start: 2019-05-02 | End: 2019-09-19

## 2019-05-02 ASSESSMENT — 6 MINUTE WALK TEST (6MWT): TOTAL DISTANCE WALKED (FT): 1230

## 2019-05-02 NOTE — TELEPHONE ENCOUNTER
Per RICHELLE Dodson to send new prescription for Naltrexone 50mg to be taken twice daily. Will send new script.

## 2019-05-04 ASSESSMENT — ANXIETY QUESTIONNAIRES
6. BECOMING EASILY ANNOYED OR IRRITABLE: SEVERAL DAYS
GAD7 TOTAL SCORE: 2
7. FEELING AFRAID AS IF SOMETHING AWFUL MIGHT HAPPEN: NOT AT ALL
5. BEING SO RESTLESS THAT IT IS HARD TO SIT STILL: NOT AT ALL
GAD7 TOTAL SCORE: 2
3. WORRYING TOO MUCH ABOUT DIFFERENT THINGS: NOT AT ALL
2. NOT BEING ABLE TO STOP OR CONTROL WORRYING: NOT AT ALL
1. FEELING NERVOUS, ANXIOUS, OR ON EDGE: SEVERAL DAYS
7. FEELING AFRAID AS IF SOMETHING AWFUL MIGHT HAPPEN: NOT AT ALL
4. TROUBLE RELAXING: NOT AT ALL

## 2019-05-05 ASSESSMENT — ANXIETY QUESTIONNAIRES: GAD7 TOTAL SCORE: 2

## 2019-05-06 DIAGNOSIS — R60.1 GENERALIZED EDEMA: Primary | ICD-10-CM

## 2019-05-07 ENCOUNTER — OFFICE VISIT (OUTPATIENT)
Dept: OBGYN | Facility: CLINIC | Age: 64
End: 2019-05-07
Attending: INTERNAL MEDICINE
Payer: MEDICARE

## 2019-05-07 VITALS
BODY MASS INDEX: 31.57 KG/M2 | DIASTOLIC BLOOD PRESSURE: 74 MMHG | SYSTOLIC BLOOD PRESSURE: 122 MMHG | HEIGHT: 60 IN | HEART RATE: 68 BPM | WEIGHT: 160.8 LBS

## 2019-05-07 DIAGNOSIS — Z12.4 SCREENING FOR MALIGNANT NEOPLASM OF CERVIX: ICD-10-CM

## 2019-05-07 DIAGNOSIS — Z01.411 ENCOUNTER FOR GYNECOLOGICAL EXAMINATION WITH ABNORMAL FINDING: Primary | ICD-10-CM

## 2019-05-07 DIAGNOSIS — N95.2 VAGINAL ATROPHY: ICD-10-CM

## 2019-05-07 PROCEDURE — G0476 HPV COMBO ASSAY CA SCREEN: HCPCS | Performed by: ADVANCED PRACTICE MIDWIFE

## 2019-05-07 PROCEDURE — 87624 HPV HI-RISK TYP POOLED RSLT: CPT | Performed by: ADVANCED PRACTICE MIDWIFE

## 2019-05-07 PROCEDURE — G0463 HOSPITAL OUTPT CLINIC VISIT: HCPCS | Mod: ZF

## 2019-05-07 PROCEDURE — G0145 SCR C/V CYTO,THINLAYER,RESCR: HCPCS | Performed by: ADVANCED PRACTICE MIDWIFE

## 2019-05-07 RX ORDER — FUROSEMIDE 20 MG
20 TABLET ORAL DAILY
Qty: 90 TABLET | Refills: 3 | Status: SHIPPED | OUTPATIENT
Start: 2019-05-07 | End: 2020-05-11

## 2019-05-07 RX ORDER — ESTRADIOL 10 UG/1
10 INSERT VAGINAL DAILY
Qty: 14 TABLET | Refills: 0 | Status: SHIPPED | OUTPATIENT
Start: 2019-05-07 | End: 2019-10-07

## 2019-05-07 RX ORDER — ESTRADIOL 10 UG/1
10 INSERT VAGINAL
Qty: 30 TABLET | Refills: 3 | Status: SHIPPED | OUTPATIENT
Start: 2019-05-09 | End: 2022-03-09

## 2019-05-07 ASSESSMENT — MIFFLIN-ST. JEOR: SCORE: 1215.88

## 2019-05-07 NOTE — PROGRESS NOTES
Subjective:  Elizabeth Palma is a 63 year old female who presents today for her annual exam.  HPI: no specific concerns today   - Here for pap, last pap  NILM, HPV Negative; denies history of abnormals   - Reports ongoing painful intercourse since becoming sexually active. Is not engaging in intercourse frequently, but persistent discomfort.  Tried replens without improvement.    - Denies any abnormal bleeding   - Mammogram completed 2018, which was normal   - Multiple co-morbidities managed by PCP    Menstrual History:  OB History    Para Term  AB Living   9 2 2 0 7 2   SAB TAB Ectopic Multiple Live Births   7 0 0 0 2      # Outcome Date GA Lbr Horacio/2nd Weight Sex Delivery Anes PTL Lv   9 SAB            8 SAB            7 SAB            6 SAB            5 SAB            4 SAB            3 SAB            2 Term     M CS-Unspec      1 Term     M CS-Unspec         Obstetric Comments   MAB x 7      No LMP recorded (lmp unknown). Patient is postmenopausal.   Periods are absent,  Dysmenorrhea none. Cyclic symptoms include  NONE. Additional symptoms include NONE  Social History:  Current contraception: postmenopausal  Number of partners in last year: 1    Social History     Socioeconomic History     Marital status:      Spouse name: Rahat     Number of children: 2     Years of education: Not on file     Highest education level: Not on file   Occupational History     Comment: part-time   Social Needs     Financial resource strain: Not on file     Food insecurity:     Worry: Not on file     Inability: Not on file     Transportation needs:     Medical: Not on file     Non-medical: Not on file   Tobacco Use     Smoking status: Former Smoker     Smokeless tobacco: Never Used   Substance and Sexual Activity     Alcohol use: No     Alcohol/week: 0.0 oz     Drug use: No     Sexual activity: Yes     Partners: Male     Birth control/protection: None     Comment: 1 partner   Lifestyle     Physical  activity:     Days per week: Not on file     Minutes per session: Not on file     Stress: Not on file   Relationships     Social connections:     Talks on phone: Not on file     Gets together: Not on file     Attends Catholic service: Not on file     Active member of club or organization: Not on file     Attends meetings of clubs or organizations: Not on file     Relationship status: Not on file     Intimate partner violence:     Fear of current or ex partner: Not on file     Emotionally abused: Not on file     Physically abused: Not on file     Forced sexual activity: Not on file   Other Topics Concern     Parent/sibling w/ CABG, MI or angioplasty before 65F 55M? Not Asked   Social History Narrative     Not on file     Past Screenings:  Health Maintenance   Topic Date Due     SKIN CANCER SCREENING Q1 YR (NO IN BASKET)  1955     ASTHMA ACTION PLAN Q1 YR  08/28/1962     ASTHMA CONTROL TEST Q6 MOS  08/28/1962     DEPRESSION ACTION PLAN  08/28/1975     ZOSTER IMMUNIZATION (1 of 2) 08/28/2007     ADVANCE DIRECTIVE PLANNING Q5 YRS  08/28/2012     PAP Q3 YR  12/20/2016     MEDICARE ANNUAL WELLNESS VISIT  06/07/2018     FOOT EXAM Q1 YEAR  11/20/2018     LIPID MONITORING Q1 YEAR  04/26/2019     PHQ-9 Q3 MONTHS  07/23/2019     A1C Q6 MO  10/24/2019     MICROALBUMIN Q1 YEAR  11/27/2019     MAMMO Q1 YR  12/06/2019     EYE EXAM Q1 YEAR  03/14/2020     CREATININE Q1 YEAR  04/10/2020     TSH W/ FREE T4 REFLEX Q2 YEAR  10/30/2020     DTAP/TDAP/TD IMMUNIZATION (4 - Td) 10/12/2022     COLONOSCOPY Q5 YR  11/30/2022     HIV SCREEN (SYSTEM ASSIGNED)  Completed     INFLUENZA VACCINE  Completed     HEPATITIS C SCREENING  Completed     IPV IMMUNIZATION  Aged Out     MENINGITIS IMMUNIZATION  Aged Out     Lab Results   Component Value Date    PAP NIL 12/20/2013      History of abnormal Pap smear: No    Lab Results   Component Value Date    CHOL 169 04/26/2018     Lab Results   Component Value Date    HDL 54 04/26/2018     Lab Results    Component Value Date    LDL 86 04/26/2018     Lab Results   Component Value Date    TSH 1.74 10/30/2018     Immunizations:  Immunization History   Administered Date(s) Administered     FLU 6-35 months 10/04/2015, 09/24/2016     Hep B, Peds or Adolescent 02/04/2010     HepB 02/04/2010, 03/17/2010, 08/09/2010     HepB, Unspecified 03/17/2010, 08/09/2010     Influenza (H1N1) 11/24/2009     Influenza (IIV3) PF 11/05/1999, 09/01/2008, 10/01/2009, 10/26/2011, 09/15/2012, 10/01/2013, 10/01/2014, 10/01/2015     Influenza Vaccine IM 3yrs+ 4 Valent IIV4 09/25/2016, 09/29/2017, 10/01/2018     Influenza Vaccine, 3 YRS +, IM (QUADRIVALENT W/PRESERVATIVES) 09/29/2017     Mantoux Tuberculin Skin Test 02/15/2010     Pneumococcal 23 valent 11/25/2008     TDAP Vaccine (Adacel) 01/01/2004, 10/12/2012     TDAP Vaccine (Boostrix) 10/12/2012     Tetanus 04/05/2005     History:  Allergies   Allergen Reactions     Percocet [Oxycodone-Acetaminophen] Nausea and Vomiting     Novocain [Procaine Hcl] Hives     Had reaction 25 years ago to old renetta. Pt reports multiple injections of lidocaine since then without reaction.  Tolerated lidocaine injection today without difficulty.  Osmar Mark MD IR Service.     Family History   Problem Relation Age of Onset     Mental Illness Other         family hx     Heart Disease Other      Diabetes Other      Cancer Other      Genetic Disorder Father      Mental Illness Father      Diabetes Father      Hypertension Father      Hyperlipidemia Mother      Diabetes Mother      Hypertension Mother      Mental Illness Other      Diabetes Other      Glaucoma No family hx of      Macular Degeneration No family hx of      Hypertension No family hx of      Past Medical History:   Diagnosis Date     Abnormal MRI, cervical spine 10/15/2011    4/2011; mild changes noted. Study done for left arm symptoms Impression:  1. Mild multilevel degenerative disc disease with no significant canal or neural stenosis seen.  motion artifact on the STIR images in these are not interpretable. The remaining images were interpreted      Autosomal dominant polycystic kidney disease 2011     (Problem list name updated by automated process. Provider to review and confirm.)     CMC DJD(carpometacarpal degenerative joint disease), localized primary 3/5/2013     -donor kidney transplant 3/20/2014     Depressive disorder 11/15/2012     DM type 2 (diabetes mellitus, type 2) (H) 2013     Encounter for long-term (current) use of other medications 2015     Family history of tremor 10/17/2011     Gastroesophageal reflux disease      Generalized anxiety disorder 11/15/2012     Glaucoma      Hyperlipidemia 10/15/2011     Hyperparathyroidism, secondary (H) 2015     Hypertension     resolved     Immunosuppressed status (H) 3/20/2014     Major depressive disorder, recurrent episode, moderate (H) 11/15/2012     Obesity (BMI 30-39.9)      OP (osteoporosis) T score -3.8 2009 T-score -3.7      LION (obstructive sleep apnoea) 10/15/2012    intol to cpa     Pain in joint, forearm -- L unhealed Fx 2013     Premature menopause age 37 7/10/2012    OCP (vaginal bldg)-->HT which she stopped 2 mo later documented at 2007 visit (age 51).      Restless leg syndrome      Rib fractures 2013     Sensory loss 10/17/2011    Bottom of feet; uncertain if there is a neuropathy per notes.       Stiffness of joint, not elsewhere classified, hand 3/5/2013     Tremor 10/15/2011    head     Uncomplicated asthma      Past Surgical History:   Procedure Laterality Date     ABDOMEN SURGERY       ANKLE SURGERY       C TRANSPLANTATION OF KIDNEY  3/2014     C/SECTION, LOW TRANSVERSE      x 2     CHOLECYSTECTOMY       COLONOSCOPY       ESOPHAGOSCOPY, GASTROSCOPY, DUODENOSCOPY (EGD), COMBINED N/A 2015    Procedure: COMBINED ESOPHAGOSCOPY, GASTROSCOPY, DUODENOSCOPY (EGD);  Surgeon: Sky Davey MD;  Location:  GI      ESOPHAGOSCOPY, GASTROSCOPY, DUODENOSCOPY (EGD), COMBINED N/A 5/19/2015    Procedure: COMBINED ESOPHAGOSCOPY, GASTROSCOPY, DUODENOSCOPY (EGD), BIOPSY SINGLE OR MULTIPLE;  Surgeon: Sky Davey MD;  Location:  GI     EYE SURGERY       LAPAROSCOPY, SURGICAL; REPAIR INCISIONAL OR VENTRAL HERNIA       ORTHOPEDIC SURGERY       HERMINIA EN Y BOWEL  1990     WRIST SURGERY       ROS:  CV: NEGATIVE for chest pain, palpitations   GI: NEGATIVE for nausea, abdominal pain, heartburn, or change in bowel habits  : NEGATIVE for frequency, dysuria, or hematuria  C: NEGATIVE for fever, chills  E: NEGATIVE for vision changes   R: NEGATIVE for significant cough or SOB  M: NEGATIVE for significant arthralgias or myalgia  N: NEGATIVE for weakness, dizziness or paresthesias or headache    Physical Exam:  /74 (BP Location: Left arm, Patient Position: Chair)   Pulse 68   Ht 1.524 m (5')   Wt 72.9 kg (160 lb 12.8 oz)   LMP  (LMP Unknown)   BMI 31.40 kg/m    BMI: Body mass index is 31.4 kg/m .  GENERAL APPEARANCE: healthy, alert and no distress  NECK: no adenopathy, no asymmetry, masses, or scars and thyroid normal to palpation  RESP: lungs clear to auscultation - no rales, rhonchi or wheezes  BREAST: normal without masses, tenderness or nipple discharge and no palpable axillary masses or adenopathy  CV: regular rates and rhythm, normal S1 S2, no S3 or S4 and no murmur, click or rub  ABDOMEN: soft, nontender, without hepatosplenomegaly or masses    PELVIC EXAM:  External Genitalia: WNL  Bartholin's/Urethral Meatus/Miltona's (BUS):  WNL/Negative  Bladder:  WNL  Vagina: significant atrophy, discomfort with small shaunna speculum. Pediatric speculum used to obtain vaginal sample for pap smear.  Uterus: deferred  Adnexae:  deferred  Anus/Perineum:  Normal    WET PREP:Not done  Gonorrhea and chlamydia screen obtained: NO      Assessment:  Annual Gyn exam within normal limits  Postmenopausal  Vaginal atrophy    Plan:  Orders  Placed This Encounter   Procedures     Obtaining, preparing and conveyance of cervical or vaginal smear to laboratory.     Pap imaged thin layer screen with HPV - recommended age 30 - 65 years (select HPV order below)     HPV High Risk Types DNA Cervical     - Reviewed screening guidelines, normal pap results/cells able to discontinue screenings.  - Discussed use of vaginal estrogen and dilators for atrophy. Pt interested in try.  Instructed on how to use vaginal estrogen, rx sent to pharmacy. Reviewed instructions for use of dilators and how to obtain, information printed for patient.  Encouraged to return to clinic as needed  - Additional teaching done at this visit included: menopause  -  RTC in one year for annual exam or with concerns.    Answers for HPI/ROS submitted by the patient on 5/4/2019   NELDA 7 TOTAL SCORE: 2

## 2019-05-07 NOTE — LETTER
2019     RE: Elizabeth Palma  [address]  [facility] [id number]     Dear Colleague,    Thank you for referring your patient, Elizabeth Palma, to the [facility] at [facility]. Please see a copy of my visit note below.    Subjective:  Elizabeth Palma is a 63 year old female who presents today for her annual exam.  HPI: no specific concerns today   - Here for pap, last pap  NILM, HPV Negative; denies history of abnormals   - Reports ongoing painful intercourse since becoming sexually active. Is not engaging in intercourse frequently, but persistent discomfort.  Tried replens without improvement.    - Denies any abnormal bleeding   - Mammogram completed 2018, which was normal   - Multiple co-morbidities managed by PCP    Menstrual History:  OB History    Para Term  AB Living   9 2 2 0 7 2   SAB TAB Ectopic Multiple Live Births   7 0 0 0 2      # Outcome Date GA Lbr Horacio/2nd Weight Sex Delivery Anes PTL Lv   9 SAB            8 SAB            7 SAB            6 SAB            5 SAB            4 SAB            3 SAB            2 Term     M CS-Unspec      1 Term     M CS-Unspec         Obstetric Comments   MAB x 7      No LMP recorded (lmp unknown). Patient is postmenopausal.   Periods are absent,  Dysmenorrhea none. Cyclic symptoms include  NONE. Additional symptoms include NONE  Social History:  Current contraception: postmenopausal  Number of partners in last year: 1    Social History     Socioeconomic History     Marital status:      Spouse name: Rahat     Number of children: 2     Years of education: Not on file     Highest education level: Not on file   Occupational History     Comment: part-time   Social Needs     Financial resource strain: Not on file     Food insecurity:     Worry: Not on file     Inability: Not on file     Transportation needs:     Medical: Not on file     Non-medical: Not on file    Tobacco Use     Smoking status: Former Smoker     Smokeless tobacco: Never Used   Substance and Sexual Activity     Alcohol use: No     Alcohol/week: 0.0 oz     Drug use: No     Sexual activity: Yes     Partners: Male     Birth control/protection: None     Comment: 1 partner   Lifestyle     Physical activity:     Days per week: Not on file     Minutes per session: Not on file     Stress: Not on file   Relationships     Social connections:     Talks on phone: Not on file     Gets together: Not on file     Attends [employer] service: Not on file     Active member of club or organization: Not on file     Attends meetings of clubs or organizations: Not on file     Relationship status: Not on file     Intimate partner violence:     Fear of current or ex partner: Not on file     Emotionally abused: Not on file     Physically abused: Not on file     Forced sexual activity: Not on file   Other Topics Concern     Parent/sibling w/ CABG, MI or angioplasty before 65F 55M? Not Asked   Social History Narrative     Not on file     Past Screenings:  Health Maintenance   Topic Date Due     SKIN CANCER SCREENING Q1 YR (NO IN BASKET)  1955     ASTHMA ACTION PLAN Q1 YR  08/28/1962     ASTHMA CONTROL TEST Q6 MOS  08/28/1962     DEPRESSION ACTION PLAN  08/28/1975     ZOSTER IMMUNIZATION (1 of 2) 08/28/2007     ADVANCE DIRECTIVE PLANNING Q5 YRS  08/28/2012     PAP Q3 YR  12/20/2016     MEDICARE ANNUAL WELLNESS VISIT  06/07/2018     FOOT EXAM Q1 YEAR  11/20/2018     LIPID MONITORING Q1 YEAR  04/26/2019     PHQ-9 Q3 MONTHS  07/23/2019     A1C Q6 MO  10/24/2019     MICROALBUMIN Q1 YEAR  11/27/2019     MAMMO Q1 YR  12/06/2019     EYE EXAM Q1 YEAR  03/14/2020     CREATININE Q1 YEAR  04/10/2020     TSH W/ FREE T4 REFLEX Q2 YEAR  10/30/2020     DTAP/TDAP/TD IMMUNIZATION (4 - Td) 10/12/2022     COLONOSCOPY Q5 YR  11/30/2022     HIV SCREEN (SYSTEM ASSIGNED)  Completed     INFLUENZA VACCINE  Completed     HEPATITIS C SCREENING  Completed      IPV IMMUNIZATION  Aged Out     MENINGITIS IMMUNIZATION  Aged Out     Lab Results   Component Value Date    PAP NIL 12/20/2013      History of abnormal Pap smear: No    Lab Results   Component Value Date    CHOL 169 04/26/2018     Lab Results   Component Value Date    HDL 54 04/26/2018     Lab Results   Component Value Date    LDL 86 04/26/2018     Lab Results   Component Value Date    TSH 1.74 10/30/2018     Immunizations:  Immunization History   Administered Date(s) Administered     FLU 6-35 months 10/04/2015, 09/24/2016     Hep B, Peds or Adolescent 02/04/2010     HepB 02/04/2010, 03/17/2010, 08/09/2010     HepB, Unspecified 03/17/2010, 08/09/2010     Influenza (H1N1) 11/24/2009     Influenza (IIV3) PF 11/05/1999, 09/01/2008, 10/01/2009, 10/26/2011, 09/15/2012, 10/01/2013, 10/01/2014, 10/01/2015     Influenza Vaccine IM 3yrs+ 4 Valent IIV4 09/25/2016, 09/29/2017, 10/01/2018     Influenza Vaccine, 3 YRS +, IM (QUADRIVALENT W/PRESERVATIVES) 09/29/2017     Mantoux Tuberculin Skin Test 02/15/2010     Pneumococcal 23 valent 11/25/2008     TDAP Vaccine (Adacel) 01/01/2004, 10/12/2012     TDAP Vaccine (Boostrix) 10/12/2012     Tetanus 04/05/2005     History:  Allergies   Allergen Reactions     Percocet [Oxycodone-Acetaminophen] Nausea and Vomiting     Novocain [Procaine Hcl] Hives     Had reaction 25 years ago to old renetta. Pt reports multiple injections of lidocaine since then without reaction.  Tolerated lidocaine injection today without difficulty.  Osmar Mark MD IR Service.     Family History   Problem Relation Age of Onset     Mental Illness Other         family hx     Heart Disease Other      Diabetes Other      Cancer Other      Genetic Disorder Father      Mental Illness Father      Diabetes Father      Hypertension Father      Hyperlipidemia Mother      Diabetes Mother      Hypertension Mother      Mental Illness Other      Diabetes Other      Glaucoma No family hx of      Macular Degeneration No family  hx of      Hypertension No family hx of      Past Medical History:   Diagnosis Date     Abnormal MRI, cervical spine 10/15/2011    2011; mild changes noted. Study done for left arm symptoms Impression:  1. Mild multilevel degenerative disc disease with no significant canal or neural stenosis seen. motion artifact on the STIR images in these are not interpretable. The remaining images were interpreted      Autosomal dominant polycystic kidney disease 2011     (Problem list name updated by automated process. Provider to review and confirm.)     CMC DJD(carpometacarpal degenerative joint disease), localized primary 3/5/2013     -donor kidney transplant 3/20/2014     Depressive disorder 11/15/2012     DM type 2 (diabetes mellitus, type 2) (H) 2013     Encounter for long-term (current) use of other medications 2015     Family history of tremor 10/17/2011     Gastroesophageal reflux disease      Generalized anxiety disorder 11/15/2012     Glaucoma      Hyperlipidemia 10/15/2011     Hyperparathyroidism, secondary (H) 2015     Hypertension     resolved     Immunosuppressed status (H) 3/20/2014     Major depressive disorder, recurrent episode, moderate (H) 11/15/2012     Obesity (BMI 30-39.9)      OP (osteoporosis) T score -3.8 2009 T-score -3.7      LION (obstructive sleep apnoea) 10/15/2012    intol to cpa     Pain in joint, forearm -- L unhealed Fx 2013     Premature menopause age 37 7/10/2012    OCP (vaginal bldg)-->HT which she stopped 2 mo later documented at 2007 visit (age 51).      Restless leg syndrome      Rib fractures 2013     Sensory loss 10/17/2011    Bottom of feet; uncertain if there is a neuropathy per notes.       Stiffness of joint, not elsewhere classified, hand 3/5/2013     Tremor 10/15/2011    head     Uncomplicated asthma      Past Surgical History:   Procedure Laterality Date     ABDOMEN SURGERY       ANKLE SURGERY       C TRANSPLANTATION OF  KIDNEY  3/2014     C/SECTION, LOW TRANSVERSE      x 2     CHOLECYSTECTOMY  1990     COLONOSCOPY       ESOPHAGOSCOPY, GASTROSCOPY, DUODENOSCOPY (EGD), COMBINED N/A 5/19/2015    Procedure: COMBINED ESOPHAGOSCOPY, GASTROSCOPY, DUODENOSCOPY (EGD);  Surgeon: Sky Davey MD;  Location:  GI     ESOPHAGOSCOPY, GASTROSCOPY, DUODENOSCOPY (EGD), COMBINED N/A 5/19/2015    Procedure: COMBINED ESOPHAGOSCOPY, GASTROSCOPY, DUODENOSCOPY (EGD), BIOPSY SINGLE OR MULTIPLE;  Surgeon: Sky Davey MD;  Location:  GI     EYE SURGERY       LAPAROSCOPY, SURGICAL; REPAIR INCISIONAL OR VENTRAL HERNIA       ORTHOPEDIC SURGERY       HERMINIA EN Y BOWEL  1990     WRIST SURGERY       ROS:  CV: NEGATIVE for chest pain, palpitations   GI: NEGATIVE for nausea, abdominal pain, heartburn, or change in bowel habits  : NEGATIVE for frequency, dysuria, or hematuria  C: NEGATIVE for fever, chills  E: NEGATIVE for vision changes   R: NEGATIVE for significant cough or SOB  M: NEGATIVE for significant arthralgias or myalgia  N: NEGATIVE for weakness, dizziness or paresthesias or headache    Physical Exam:  /74 (BP Location: Left arm, Patient Position: Chair)   Pulse 68   Ht 1.524 m (5')   Wt 72.9 kg (160 lb 12.8 oz)   LMP  (LMP Unknown)   BMI 31.40 kg/m     BMI: Body mass index is 31.4 kg/m .  GENERAL APPEARANCE: healthy, alert and no distress  NECK: no adenopathy, no asymmetry, masses, or scars and thyroid normal to palpation  RESP: lungs clear to auscultation - no rales, rhonchi or wheezes  BREAST: normal without masses, tenderness or nipple discharge and no palpable axillary masses or adenopathy  CV: regular rates and rhythm, normal S1 S2, no S3 or S4 and no murmur, click or rub  ABDOMEN: soft, nontender, without hepatosplenomegaly or masses    PELVIC EXAM:  External Genitalia: WNL  Bartholin's/Urethral Meatus/Hartly's (BUS):  WNL/Negative  Bladder:  WNL  Vagina: significant atrophy, discomfort with small shaunna speculum.  Pediatric speculum used to obtain vaginal sample for pap smear.  Uterus: deferred  Adnexae:  deferred  Anus/Perineum:  Normal    WET PREP:Not done  Gonorrhea and chlamydia screen obtained: NO      Assessment:  Annual Gyn exam within normal limits  Postmenopausal  Vaginal atrophy    Plan:  Orders Placed This Encounter   Procedures     Obtaining, preparing and conveyance of cervical or vaginal smear to laboratory.     Pap imaged thin layer screen with HPV - recommended age 30 - 65 years (select HPV order below)     HPV High Risk Types DNA Cervical     - Reviewed screening guidelines, normal pap results/cells able to discontinue screenings.  - Discussed use of vaginal estrogen and dilators for atrophy. Pt interested in try.  Instructed on how to use vaginal estrogen, rx sent to pharmacy. Reviewed instructions for use of dilators and how to obtain, information printed for patient.  Encouraged to return to clinic as needed  - Additional teaching done at this visit included: menopause  -  RTC in one year for annual exam or with concerns.    Answers for HPI/ROS submitted by the patient on 5/4/2019   NELDA 7 TOTAL SCORE: 2    Again, thank you for allowing me to participate in the care of your patient.      Sincerely,    GERHARD Pineda CNM

## 2019-05-10 LAB
COPATH REPORT: NORMAL
PAP: NORMAL

## 2019-05-13 ENCOUNTER — OFFICE VISIT (OUTPATIENT)
Dept: ENDOCRINOLOGY | Facility: CLINIC | Age: 64
End: 2019-05-13
Payer: MEDICARE

## 2019-05-13 VITALS
HEART RATE: 67 BPM | WEIGHT: 162.9 LBS | SYSTOLIC BLOOD PRESSURE: 152 MMHG | HEIGHT: 61 IN | RESPIRATION RATE: 18 BRPM | OXYGEN SATURATION: 99 % | BODY MASS INDEX: 30.76 KG/M2 | TEMPERATURE: 98.1 F | DIASTOLIC BLOOD PRESSURE: 79 MMHG

## 2019-05-13 DIAGNOSIS — E66.01 MORBID OBESITY (H): Primary | ICD-10-CM

## 2019-05-13 ASSESSMENT — MIFFLIN-ST. JEOR: SCORE: 1241.29

## 2019-05-13 ASSESSMENT — PAIN SCALES - GENERAL: PAINLEVEL: NO PAIN (0)

## 2019-05-13 NOTE — LETTER
"2019        RE: Elizabeth Palma  [address]  [facility] [address]     Dear Colleague,    Thank you for referring your patient, Elizabeth Palma, to the [facility] MEDICAL WEIGHT MANAGEMENT at [facility]. Please see a copy of my visit note below.    Return Medical Weight Management Note     Elizabeth Palma  MRN:  [id number]  :  1955  AYSE:  19    Dear Dr. Sheridan,      I had the pleasure of seeing your patient Elizabeth Palma.  She is a 63 year old female who I am continuing to see for treatment of obesity related to: DM-2, Hyperlipidemia, Sleep Apnea (has CPAP and does not use), GERD and Depression. S/p gastric bypass surgery in 1990.     CURRENT WEIGHT:   162 lbs 14.4 oz    Wt Readings from Last 4 Encounters:   19 73.9 kg (162 lb 14.4 oz)   19 72.9 kg (160 lb 12.8 oz)   19 72.4 kg (159 lb 9.6 oz)   19 71.2 kg (157 lb)     Body Mass Index:  Body mass index is 30.78 kg/m .  Vitals:  /79 (BP Location: Left arm, Patient Position: Sitting, Cuff Size: Adult Regular)   Pulse 67   Temp 98.1  F (36.7  C) (Oral)   Resp 18   Ht 1.549 m (5' 1\")   Wt 73.9 kg (162 lb 14.4 oz)   LMP  (LMP Unknown)   SpO2 99%   BMI 30.78 kg/m       Initial consult weight was 214 on 2015.  Weight change since last seen on 3/25/2019 is up 15 lbs.   Total loss is 54 pounds.    INTERVAL HISTORY:  She misunderstood and stopped topiramate 200 AM and 200 HS, did take naltrexone but not sure if is helping. Remains off gabapentin, and pain is ok. Reports she has been struggling with frequent cravings for salty snacks like crackers, chips, and pretzels throughout the day, but primarily at night.  Denies any side effects. She feels full quickly ever since gastric bypass surgery in .      Breakfast - 1/2 bagel or piece of toast for breakfast   Lunch - 1/2 sandwich or goes out for vegetarian taco  Snacks most of the time - " crackers, pretzels, vegetables, fruit, sunflower seeds   Dinner - varies, tries to eat vegetables    Has seen nutritionist who has recommended she begin getting consistent exercise. She has struggled with this due to chronic joint pain and fatigue.      Diet and Activity Changes Since Last Visit Reviewed With Patient 5/13/2019   I have made the following changes to my diet since my last visit: appitite is more   With regards to my diet, I am still struggling with: overeating   For breakfast, I typically eat: -   For lunch, I typically eat: -   For supper, I typically eat: -   For snack(s), I typically eat: -   I have made the following changes to my activity/exercise since my last visit: using a walker   With regards to my activity/exercise, I am still struggling with: level     MEDICATIONS:   Current Outpatient Medications   Medication     acetaminophen (TYLENOL) 325 MG tablet     acetylcysteine (N-ACETYL-L-CYSTEINE) 600 MG CAPS capsule     albuterol (PROAIR HFA/PROVENTIL HFA/VENTOLIN HFA) 108 (90 Base) MCG/ACT inhaler     ARIPiprazole (ABILIFY) 2 MG tablet     aspirin EC 81 MG EC tablet     blood glucose monitoring (ACCU-CHEK RALPH PLUS) meter device kit     blood glucose monitoring (NO BRAND SPECIFIED) meter device kit     blood glucose monitoring (NO BRAND SPECIFIED) test strip     blood glucose monitoring (SOFTCLIX) lancets     Cholecalciferol (VITAMIN D) 1000 UNITS capsule     cyanocolbalamin (VITAMIN  B-12) 1000 MCG tablet     cycloSPORINE modified (GENERIC EQUIVALENT) 25 MG capsule     cycloSPORINE modified (GENERIC EQUIVALENT) 25 MG capsule     econazole nitrate 1 % cream     estradiol (VAGIFEM) 10 MCG TABS vaginal tablet     estradiol (VAGIFEM) 10 MCG TABS vaginal tablet     fluticasone (FLONASE) 50 MCG/ACT nasal spray     furosemide (LASIX) 20 MG tablet     latanoprost (XALATAN) 0.005 % ophthalmic solution     metFORMIN (GLUCOPHAGE-XR) 500 MG 24 hr tablet     mycophenolate (GENERIC EQUIVALENT) 250 MG  capsule     naltrexone (DEPADE/REVIA) 50 MG tablet     omeprazole (PRILOSEC) 40 MG DR capsule     ondansetron (ZOFRAN-ODT) 4 MG ODT tab     order for DME     Polyvinyl Alcohol-Povidone (REFRESH OP)     prazosin (MINIPRESS) 2 MG capsule     prazosin (MINIPRESS) 5 MG capsule     simvastatin (ZOCOR) 20 MG tablet     sulfamethoxazole-trimethoprim (BACTRIM/SEPTRA) 400-80 MG per tablet     topiramate (TOPAMAX) 200 MG tablet     Vaginal Lubricant (REPLENS) GEL     vilazodone (VIIBRYD) 40 MG TABS tablet     Furosemide (LASIX) 20 MG tablet     No current facility-administered medications for this visit.        Weight Loss Medication History Reviewed With Patient 5/13/2019   Which weight loss medications are you currently taking on a regular basis?  Naltrexone   Are you having any side effects from the weight loss medication that we have prescribed you? No   If you are having side effects please describe: -     ASSESSMENT:   Restart topiramate and continue naltrexone 25 mg daily to curb cravings. Ttopiramate level was very generous on her intended dose. Will work back up to topiramate 200 morning and evening. Advised she remove snacks from the home. Remains off gabapentin with acceptable neuropathy pain control.     FOLLOW-UP:    3-4 months  10/15 minutes spent on counseling and education    Again, thank you for allowing me to participate in the care of your patient.      Sincerely,    Prakash Ierne MD

## 2019-05-13 NOTE — PROGRESS NOTES
"    Return Medical Weight Management Note     Elizabeth Palma  MRN:  [id number]  :  1955  AYSE:  19    Dear Dr. Sheridan,      I had the pleasure of seeing your patient Elizabeth Palma.  She is a 63 year old female who I am continuing to see for treatment of obesity related to: DM-2, Hyperlipidemia, Sleep Apnea (has CPAP and does not use), GERD and Depression. S/p gastric bypass surgery in 1990.     CURRENT WEIGHT:   162 lbs 14.4 oz    Wt Readings from Last 4 Encounters:   19 73.9 kg (162 lb 14.4 oz)   19 72.9 kg (160 lb 12.8 oz)   19 72.4 kg (159 lb 9.6 oz)   19 71.2 kg (157 lb)     Body Mass Index:  Body mass index is 30.78 kg/m .  Vitals:  /79 (BP Location: Left arm, Patient Position: Sitting, Cuff Size: Adult Regular)   Pulse 67   Temp 98.1  F (36.7  C) (Oral)   Resp 18   Ht 1.549 m (5' 1\")   Wt 73.9 kg (162 lb 14.4 oz)   LMP  (LMP Unknown)   SpO2 99%   BMI 30.78 kg/m      Initial consult weight was 214 on 2015.  Weight change since last seen on 3/25/2019 is up 15 lbs.   Total loss is 54 pounds.    INTERVAL HISTORY:  She misunderstood and stopped topiramate 200 AM and 200 HS, did take naltrexone but not sure if is helping. Remains off gabapentin, and pain is ok. Reports she has been struggling with frequent cravings for salty snacks like crackers, chips, and pretzels throughout the day, but primarily at night.  Denies any side effects. She feels full quickly ever since gastric bypass surgery in .      Breakfast - 1/2 bagel or piece of toast for breakfast   Lunch - 1/2 sandwich or goes out for vegetarian taco  Snacks most of the time - crackers, pretzels, vegetables, fruit, sunflower seeds   Dinner - varies, tries to eat vegetables    Has seen nutritionist who has recommended she begin getting consistent exercise. She has struggled with this due to chronic joint pain and fatigue.      Diet and Activity Changes Since Last Visit Reviewed With " Patient 5/13/2019   I have made the following changes to my diet since my last visit: appitite is more   With regards to my diet, I am still struggling with: overeating   For breakfast, I typically eat: -   For lunch, I typically eat: -   For supper, I typically eat: -   For snack(s), I typically eat: -   I have made the following changes to my activity/exercise since my last visit: using a walker   With regards to my activity/exercise, I am still struggling with: level     MEDICATIONS:   Current Outpatient Medications   Medication     acetaminophen (TYLENOL) 325 MG tablet     acetylcysteine (N-ACETYL-L-CYSTEINE) 600 MG CAPS capsule     albuterol (PROAIR HFA/PROVENTIL HFA/VENTOLIN HFA) 108 (90 Base) MCG/ACT inhaler     ARIPiprazole (ABILIFY) 2 MG tablet     aspirin EC 81 MG EC tablet     blood glucose monitoring (ACCU-CHEK RALPH PLUS) meter device kit     blood glucose monitoring (NO BRAND SPECIFIED) meter device kit     blood glucose monitoring (NO BRAND SPECIFIED) test strip     blood glucose monitoring (SOFTCLIX) lancets     Cholecalciferol (VITAMIN D) 1000 UNITS capsule     cyanocolbalamin (VITAMIN  B-12) 1000 MCG tablet     cycloSPORINE modified (GENERIC EQUIVALENT) 25 MG capsule     cycloSPORINE modified (GENERIC EQUIVALENT) 25 MG capsule     econazole nitrate 1 % cream     estradiol (VAGIFEM) 10 MCG TABS vaginal tablet     estradiol (VAGIFEM) 10 MCG TABS vaginal tablet     fluticasone (FLONASE) 50 MCG/ACT nasal spray     furosemide (LASIX) 20 MG tablet     latanoprost (XALATAN) 0.005 % ophthalmic solution     metFORMIN (GLUCOPHAGE-XR) 500 MG 24 hr tablet     mycophenolate (GENERIC EQUIVALENT) 250 MG capsule     naltrexone (DEPADE/REVIA) 50 MG tablet     omeprazole (PRILOSEC) 40 MG DR capsule     ondansetron (ZOFRAN-ODT) 4 MG ODT tab     order for DME     Polyvinyl Alcohol-Povidone (REFRESH OP)     prazosin (MINIPRESS) 2 MG capsule     prazosin (MINIPRESS) 5 MG capsule     simvastatin (ZOCOR) 20 MG tablet      sulfamethoxazole-trimethoprim (BACTRIM/SEPTRA) 400-80 MG per tablet     topiramate (TOPAMAX) 200 MG tablet     Vaginal Lubricant (REPLENS) GEL     vilazodone (VIIBRYD) 40 MG TABS tablet     Furosemide (LASIX) 20 MG tablet     No current facility-administered medications for this visit.        Weight Loss Medication History Reviewed With Patient 5/13/2019   Which weight loss medications are you currently taking on a regular basis?  Naltrexone   Are you having any side effects from the weight loss medication that we have prescribed you? No   If you are having side effects please describe: -     ASSESSMENT:   Restart topiramate and continue naltrexone 25 mg daily to curb cravings. Ttopiramate level was very generous on her intended dose. Will work back up to topiramate 200 morning and evening. Advised she remove snacks from the home. Remains off gabapentin with acceptable neuropathy pain control.     FOLLOW-UP:    3-4 months  10/15 minutes spent on counseling and education

## 2019-05-13 NOTE — NURSING NOTE
"Chief Complaint   Patient presents with     Weight Problem     PT here for weight management follow up       Vitals:    05/13/19 1129   BP: 152/79   BP Location: Left arm   Patient Position: Sitting   Cuff Size: Adult Regular   Pulse: 67   Resp: 18   Temp: 98.1  F (36.7  C)   TempSrc: Oral   SpO2: 99%   Weight: 73.9 kg (162 lb 14.4 oz)   Height: 1.549 m (5' 1\")       Body mass index is 30.78 kg/m .      ALHAJI Still NREMT                      "

## 2019-05-14 LAB
FINAL DIAGNOSIS: NORMAL
HPV HR 12 DNA CVX QL NAA+PROBE: NEGATIVE
HPV16 DNA SPEC QL NAA+PROBE: NEGATIVE
HPV18 DNA SPEC QL NAA+PROBE: NEGATIVE
SPECIMEN DESCRIPTION: NORMAL
SPECIMEN SOURCE CVX/VAG CYTO: NORMAL

## 2019-05-20 ENCOUNTER — MEDICAL CORRESPONDENCE (OUTPATIENT)
Dept: HEALTH INFORMATION MANAGEMENT | Facility: CLINIC | Age: 64
End: 2019-05-20

## 2019-05-21 ENCOUNTER — HOSPITAL ENCOUNTER (OUTPATIENT)
Dept: PHYSICAL THERAPY | Facility: CLINIC | Age: 64
Setting detail: THERAPIES SERIES
End: 2019-05-21
Attending: PSYCHIATRY & NEUROLOGY
Payer: MEDICARE

## 2019-05-21 PROCEDURE — 97116 GAIT TRAINING THERAPY: CPT | Mod: GP | Performed by: PHYSICAL THERAPIST

## 2019-05-21 PROCEDURE — 97110 THERAPEUTIC EXERCISES: CPT | Mod: GP | Performed by: PHYSICAL THERAPIST

## 2019-05-21 PROCEDURE — 97112 NEUROMUSCULAR REEDUCATION: CPT | Mod: GP | Performed by: PHYSICAL THERAPIST

## 2019-05-28 ENCOUNTER — ALLIED HEALTH/NURSE VISIT (OUTPATIENT)
Dept: SURGERY | Facility: CLINIC | Age: 64
End: 2019-05-28
Payer: MEDICARE

## 2019-05-28 VITALS — WEIGHT: 161.3 LBS | BODY MASS INDEX: 30.48 KG/M2

## 2019-05-28 NOTE — PROGRESS NOTES
"Nutrition Reassessment  Reason For Visit:  Elizabeth Palma is a 63 year-old female with type 2 DM (oral med management) presenting today for nutrition follow-up, s/p \"gastric bypass in 1990 at [facility]\" per Pt report.  She is seeing medical weight management.  She was referred by Dr. Irene.  Note: Pt had a kidney transplant on March 20, 2014.    Pt was accompanied by her .     Anthropometrics:  Height: 61\"  Pre-op Weight: 265 lbs per Pt report; lowest weight after bariatric surgery: 165-170 lbs (25 years ago)  (Pt reported that she initially was at 215 lbs in 2015)    Current Weight: 161.3 lbs (+1.7 lbs in the past month)    Current Vitamins/Minerals: 3000 International Units vitamin D/day, Calcium 2x daily, vitamin C, vitamin B12 daily, B-complex  *Pt stated that she was told not to take other vitamins/minerals by MD.   3/27/19: Started Naltrexone at most recent MD appointment  4/30/19: patient reported that she is more hungry and is eating larger portions since medication change  5/28/19: taking Topiramate and Naltrexone     Nutrition History:  Lactose intolerance ever since bariatric surgery.  Pt stated that she has osteoporosis; however, she stated that her doctors don't want her to take any vitamins or minerals due to her kidney transplant.    Diet/Nutrition Intake Hx:   On previous RD visits, pt reported her intake is affected by nausea 2/2 her anti-rejection medications.  Pt is also limited in protein choices that she likes - pt reports she does not take much dairy, does not like beans and lentils, keeps kosher. Pt also stated on previous visits that she will not record food intake due to the fact that every time she does this, she simply does not eat as she doesn't want to record.  She stated that her therapist strongly advises against recording food intake for this reason.     Physical Activity Note: She reported doing some walking in hallways and in hallways at work, but no structured exercise. " At a previous RD visit reported that she has a tendency to break bones so she is limited with what exercises she does.     Progress with previous goals:  1. Eat 3 meals with lean protein at each meal, along with a non-starchy vegetable or whole fruit, up to 1/2 c carb at a meal. Lean meat and cheese, hot dog, steak    -Can try a protein drink for breakfast meal Such as [facility] drinks or Integrated supplement (no sugar substitute and has a small sized container to try), pure protein, muscle. or protein water ([facility] clear protein drink or Bipro or Premier clear). Can add protein powder into coffee for example.  2. If needing a snack, have vegetables or protein snack (give at least 2 hours between a meals and a snack). Able to control snacks better now, some popcorn and some trail mix   3. Walk 10 min daily, increasing as able. (Starting physical therapy) going to physical therapy - walking more   4. Continue working on Meal planning for all meals. Getting better at meal planning better appetite control now     Nutrition Prescription:   Grams Protein: 60 (minimum) - Not meeting consistently.   Amount of Fluid: 48-64 oz    Nutrition Diagnosis  Current: Overweight related to history of excessive energy intake, variable eating habits/intake and lack of sufficient exercise as evidenced by pt report and BMI > 25. continues    Intervention  Intervention At Appointment:  Materials/Education provided: Reviewed her progress towards previous goals.  Patient reported that she was restarted on Topiramate along with naltrexone.  She noted that her hunger/appetite has been more controlled over the past few weeks with restarting Topiramate.  She continues to go to physical therapy and finds that it is helping her feel stronger and more confident with walking.  She has been planning meals more, she will forget that it is meal time until her  says something but she now has a plan on what to make rather than grabbing whatever  is available.  Gave encouragement and support.    Patient Understanding: good  Expected Compliance: good with continued RD follow up.     Goals:   1. Eat 3 meals with lean protein at each meal, along with a non-starchy vegetable or whole fruit, up to 1/2 c carb at a meal.  2. If needing a snack, have vegetables or protein snack (give at least 2 hours between a meals and a snack).  3. Walk 10 min daily, increasing as able. (Starting physical therapy)  4. Continue working on Meal planning for all meals.    Follow-Up: 1 month or PRN     Time spent with patient: 15 minutes.  Leyla Guerrero, RD, LD

## 2019-05-28 NOTE — PATIENT INSTRUCTIONS
Goals:   1. Eat 3 meals with lean protein at each meal, along with a non-starchy vegetable or whole fruit, up to 1/2 c carb at a meal.  2. If needing a snack, have vegetables or protein snack (give at least 2 hours between a meals and a snack).  3. Walk 10 min daily, increasing as able. (Starting physical therapy)  4. Continue working on Meal planning for all meals.    Follow up with RD in one month    Leyla Guerrero RD, LD  If you need to schedule or reschedule with a dietitian please call [phone].

## 2019-05-29 ENCOUNTER — MEDICAL CORRESPONDENCE (OUTPATIENT)
Dept: HEALTH INFORMATION MANAGEMENT | Facility: CLINIC | Age: 64
End: 2019-05-29

## 2019-05-29 DIAGNOSIS — Z94.0 KIDNEY REPLACED BY TRANSPLANT: Primary | ICD-10-CM

## 2019-05-29 RX ORDER — CYCLOSPORINE 25 MG/1
125 CAPSULE, LIQUID FILLED ORAL 2 TIMES DAILY
Qty: 300 CAPSULE | Refills: 11 | Status: SHIPPED | OUTPATIENT
Start: 2019-05-29 | End: 2020-05-18

## 2019-05-30 ENCOUNTER — HOSPITAL ENCOUNTER (OUTPATIENT)
Dept: PHYSICAL THERAPY | Facility: CLINIC | Age: 64
Setting detail: THERAPIES SERIES
End: 2019-05-30
Attending: PSYCHIATRY & NEUROLOGY
Payer: MEDICARE

## 2019-05-30 PROCEDURE — 97116 GAIT TRAINING THERAPY: CPT | Mod: GP | Performed by: PHYSICAL THERAPIST

## 2019-05-30 PROCEDURE — 97110 THERAPEUTIC EXERCISES: CPT | Mod: GP | Performed by: PHYSICAL THERAPIST

## 2019-05-31 ENCOUNTER — OFFICE VISIT (OUTPATIENT)
Dept: INTERNAL MEDICINE | Facility: CLINIC | Age: 64
End: 2019-05-31
Payer: MEDICARE

## 2019-05-31 VITALS
TEMPERATURE: 97.8 F | HEART RATE: 55 BPM | DIASTOLIC BLOOD PRESSURE: 69 MMHG | HEIGHT: 61 IN | OXYGEN SATURATION: 99 % | SYSTOLIC BLOOD PRESSURE: 103 MMHG | BODY MASS INDEX: 30.98 KG/M2 | WEIGHT: 164.1 LBS

## 2019-05-31 DIAGNOSIS — G56.01 CARPAL TUNNEL SYNDROME OF RIGHT WRIST: ICD-10-CM

## 2019-05-31 DIAGNOSIS — Z01.818 PRE-OP EXAM: Primary | ICD-10-CM

## 2019-05-31 ASSESSMENT — ENCOUNTER SYMPTOMS
PANIC: 0
NERVOUS/ANXIOUS: 1
DECREASED CONCENTRATION: 0
DEPRESSION: 1
INSOMNIA: 1

## 2019-05-31 ASSESSMENT — PAIN SCALES - GENERAL: PAINLEVEL: MILD PAIN (3)

## 2019-05-31 ASSESSMENT — MIFFLIN-ST. JEOR: SCORE: 1238.98

## 2019-05-31 NOTE — LETTER
2019      RE: Elizabeth Palma  [address]  [facility] [address]       Surgeon (please enter first and last name): Dr Francisco Fuentes  Fax number for Preop Evaluation: [phone]  Location of Surgery: [facility] [place]  Date of Surgery: 19  Procedure: carpal tunnel on left wrist  History of reaction to anesthesia? NO          Elizabeth Palma is 63 year old female here at the request of Dr. Fuentes for cardiovascular, pulmonary, and perioperative risk assessment prior to surgery.The intended surgical procedure is left carpal tunnel release. A copy of this note will be sent to the surgeon.    A/P:  Elizabeth Palma with a history of   Patient Active Problem List    Diagnosis Date Noted     Type 2 diabetes mellitus with peripheral neuropathy (H) 2015     Priority: High     -donor kidney transplant 2014     Priority: High     Major depressive disorder, recurrent episode, moderate (H) 11/15/2012     Priority: High     OP (osteoporosis) T score -3.8 2009     Priority: High     2007 T-score -3.7       Median sensory neuropathy, left 2019     Priority: Medium     Nerve conduction study down at [facility] in [place] on 3/7/19 shows: mild left median neuropathy at wrist, left median motor distal latency is normal, the left ulnar motor conduction velocity is abnormally slowed       Personal history of other drug therapy 2018     Priority: Medium     Marital conflict 2018     Priority: Medium     Suicidal ideation 2018     Priority: Medium     Narcissistic personality disorder (H) 2018     Priority: Medium     Age-related osteoporosis without current pathological fracture 08/10/2016     Priority: Medium     Right knee pain 2016     Priority: Medium     Left knee pain 2016     Priority: Medium     Posttraumatic stress disorder 2015     Priority: Medium     Nightmares associated with chronic post-traumatic stress  disorder 10/27/2015     Priority: Medium     Pain in joint involving ankle and foot 07/13/2015     Priority: Medium     Senile osteoporosis 05/29/2015     Priority: Medium     Hyperparathyroidism (H) 02/26/2015     Priority: Medium     Problem list name updated by automated process. Provider to review       Hyperparathyroidism, secondary (H) 02/25/2015     Priority: Medium     Secondary hyperparathyroidism (H) 02/25/2015     Priority: Medium     Immunosuppressed status (H) 03/20/2014     Priority: Medium     Pain in joint, forearm -- L unhealed Fx 05/21/2013     Priority: Medium     CMC DJD(carpometacarpal degenerative joint disease), localized primary 03/05/2013     Priority: Medium     Generalized anxiety disorder 11/15/2012     Priority: Medium     Premature menopause age 37 07/10/2012     Priority: Medium     OCP (vaginal bldg)-->HT which she stopped 2 mo later documented at Jan 12, 2007 visit (age 51).       Sensory loss 10/17/2011     Priority: Medium     Bottom of feet; uncertain if there is a neuropathy per notes.        Hypertension 10/15/2011     Priority: Medium     Hyperlipidemia 10/15/2011     Priority: Medium     Abnormal MRI, cervical spine 10/15/2011     Priority: Medium     4/2011; mild changes noted. Study done for left arm symptoms  Impression:   1. Mild multilevel degenerative disc disease with no significant canal  or neural stenosis seen. motion artifact on the STIR images in these  are not interpretable. The remaining images were interpreted       Autosomal dominant polycystic kidney disease 07/05/2011     Priority: Medium     Overview:   Overview:   (Problem list name updated by automated process. Provider to review and confirm.)       presents prior to surgery for assessment of perioperative risk. The patient is at LOW risk for cardiovascular complications and at LOW risk for pulmonary complications of this MODERATE risk surgery.    The proposed surgical procedure is considered LOW  risk.    REVISED CARDIAC RISK INDEX  The patient has the following serious cardiovascular risks for perioperative complications such as (MI, PE, VFib and 3  AV Block):  No serious cardiac risks  INTERPRETATION: 0 risks: Class I (very low risk - 0.4% complication rate)    I recommend that she proceed with the proposed procedure, without further diagnostic evaluation    I asked her to stop her aspirin today which she will do.  She can resume after the surgery.    The patient has been instructed as to what to do with medications prior to surgery.    Laboratory studies:  None    Please contact our office if there are any further questions or information required about this patient.    Horacio Sheridan MD, FACP    --------------------------------------------------------    HPI:   Reason for surgery:    The left wrist is painful all of the time (ache) and keeps her up at night.  She does not get much numbness.  She does notice weakness - can't open bottles or hold things.  She got a cortisone which did not help so the surgery is hopefully going to help.  She had surgery on her right wrist earlier this year.    Cardiovascular Risk:  This patient does not have chest pain with ambulation or walking up a flight of stairs.  There is not any chest pain with exercise.  There is not a history of heart disease or valve problems.    Pulmonary Risk:  The patient does not have a history of lung problems.    Perioperative Complications:  The patient does not have a history of bleeding or clotting problems in the past. The patient has not had complications from past surgeries.  The patient does not have a family history of any anesthesia or surgical complications.    Answers for HPI/ROS submitted by the patient on 5/31/2019   General Symptoms: No  Skin Symptoms: No  HENT Symptoms: No  EYE SYMPTOMS: No  HEART SYMPTOMS: No  LUNG SYMPTOMS: No  INTESTINAL SYMPTOMS: No  URINARY SYMPTOMS: No  GYNECOLOGIC SYMPTOMS: No  BREAST SYMPTOMS:  No  SKELETAL SYMPTOMS: No  BLOOD SYMPTOMS: No  NERVOUS SYSTEM SYMPTOMS: No  MENTAL HEALTH SYMPTOMS: Yes - medications help, she uses distraction - she has to work through it and it will go away, she sees therapist  Nervous or Anxious: Yes  Depression: Yes  Trouble sleeping: Yes  Trouble thinking or concentrating: No  Mood changes: Yes  Panic attacks: No      Family History   Problem Relation Age of Onset     Mental Illness Other         family hx     Heart Disease Other      Diabetes Other      Cancer Other      Genetic Disorder Father      Mental Illness Father      Diabetes Father      Hypertension Father      Hyperlipidemia Mother      Diabetes Mother      Hypertension Mother      Mental Illness Other      Diabetes Other      Glaucoma No family hx of      Macular Degeneration No family hx of      Hypertension No family hx of      Social History     Socioeconomic History     Marital status:      Spouse name: Rahat     Number of children: 2     Years of education: Not on file     Highest education level: Not on file   Occupational History     Comment: part-time   Social Needs     Financial resource strain: Not on file     Food insecurity:     Worry: Not on file     Inability: Not on file     Transportation needs:     Medical: Not on file     Non-medical: Not on file   Tobacco Use     Smoking status: Former Smoker     Smokeless tobacco: Never Used   Substance and Sexual Activity     Alcohol use: No     Alcohol/week: 0.0 oz     Drug use: No     Sexual activity: Yes     Partners: Male     Birth control/protection: None     Comment: 1 partner   Lifestyle     Physical activity:     Days per week: Not on file     Minutes per session: Not on file     Stress: Not on file   Relationships     Social connections:     Talks on phone: Not on file     Gets together: Not on file     Attends Alevism service: Not on file     Active member of club or organization: Not on file     Attends meetings of clubs or organizations:  Not on file     Relationship status: Not on file     Intimate partner violence:     Fear of current or ex partner: Not on file     Emotionally abused: Not on file     Physically abused: Not on file     Forced sexual activity: Not on file   Other Topics Concern     Parent/sibling w/ CABG, MI or angioplasty before 65F 55M? Not Asked   Social History Narrative     Not on file     Patient Active Problem List   Diagnosis     Hypertension     Hyperlipidemia     Abnormal MRI, cervical spine     Sensory loss     Premature menopause age 37     OP (osteoporosis) T score -3.8     Major depressive disorder, recurrent episode, moderate (H)     Generalized anxiety disorder     CMC DJD(carpometacarpal degenerative joint disease), localized primary     Pain in joint, forearm -- L unhealed Fx     -donor kidney transplant     Immunosuppressed status (H)     Hyperparathyroidism, secondary (H)     Hyperparathyroidism (H)     Senile osteoporosis     Pain in joint involving ankle and foot     Nightmares associated with chronic post-traumatic stress disorder     Type 2 diabetes mellitus with peripheral neuropathy (H)     Posttraumatic stress disorder     Right knee pain     Left knee pain     Age-related osteoporosis without current pathological fracture     Narcissistic personality disorder (H)     Personal history of other drug therapy     Median sensory neuropathy, left     Marital conflict     Suicidal ideation     Autosomal dominant polycystic kidney disease     Secondary hyperparathyroidism (H)     Past Surgical History:   Procedure Laterality Date     ABDOMEN SURGERY       ANKLE SURGERY       C TRANSPLANTATION OF KIDNEY  3/2014     C/SECTION, LOW TRANSVERSE      x 2     CHOLECYSTECTOMY       COLONOSCOPY       ESOPHAGOSCOPY, GASTROSCOPY, DUODENOSCOPY (EGD), COMBINED N/A 2015    Procedure: COMBINED ESOPHAGOSCOPY, GASTROSCOPY, DUODENOSCOPY (EGD);  Surgeon: Sky Davey MD;  Location:  GI     ESOPHAGOSCOPY,  GASTROSCOPY, DUODENOSCOPY (EGD), COMBINED N/A 5/19/2015    Procedure: COMBINED ESOPHAGOSCOPY, GASTROSCOPY, DUODENOSCOPY (EGD), BIOPSY SINGLE OR MULTIPLE;  Surgeon: Sky Davey MD;  Location:  GI     EYE SURGERY       LAPAROSCOPY, SURGICAL; REPAIR INCISIONAL OR VENTRAL HERNIA       ORTHOPEDIC SURGERY       HERMINIA EN Y BOWEL  1990     WRIST SURGERY       Current Outpatient Medications   Medication     acetaminophen (TYLENOL) 325 MG tablet     acetylcysteine (N-ACETYL-L-CYSTEINE) 600 MG CAPS capsule     albuterol (PROAIR HFA/PROVENTIL HFA/VENTOLIN HFA) 108 (90 Base) MCG/ACT inhaler     ARIPiprazole (ABILIFY) 2 MG tablet     aspirin EC 81 MG EC tablet     blood glucose monitoring (ACCU-CHEK RALPH PLUS) meter device kit     blood glucose monitoring (NO BRAND SPECIFIED) meter device kit     blood glucose monitoring (NO BRAND SPECIFIED) test strip     blood glucose monitoring (SOFTCLIX) lancets     Cholecalciferol (VITAMIN D) 1000 UNITS capsule     cyanocolbalamin (VITAMIN  B-12) 1000 MCG tablet     cycloSPORINE modified (GENERIC EQUIVALENT) 25 MG capsule     cycloSPORINE modified (GENERIC EQUIVALENT) 25 MG capsule     econazole nitrate 1 % cream     estradiol (VAGIFEM) 10 MCG TABS vaginal tablet     fluticasone (FLONASE) 50 MCG/ACT nasal spray     furosemide (LASIX) 20 MG tablet     latanoprost (XALATAN) 0.005 % ophthalmic solution     metFORMIN (GLUCOPHAGE-XR) 500 MG 24 hr tablet     mycophenolate (GENERIC EQUIVALENT) 250 MG capsule     naltrexone (DEPADE/REVIA) 50 MG tablet     omeprazole (PRILOSEC) 40 MG DR capsule     ondansetron (ZOFRAN-ODT) 4 MG ODT tab     order for DME     Polyvinyl Alcohol-Povidone (REFRESH OP)     prazosin (MINIPRESS) 2 MG capsule     prazosin (MINIPRESS) 5 MG capsule     simvastatin (ZOCOR) 20 MG tablet     sulfamethoxazole-trimethoprim (BACTRIM/SEPTRA) 400-80 MG per tablet     topiramate (TOPAMAX) 200 MG tablet     Vaginal Lubricant (REPLENS) GEL     vilazodone (VIIBRYD) 40 MG TABS  tablet     Furosemide (LASIX) 20 MG tablet     No current facility-administered medications for this visit.      Immunization History   Administered Date(s) Administered     FLU 6-35 months 10/04/2015, 09/24/2016     Hep B, Peds or Adolescent 02/04/2010     HepB 02/04/2010, 03/17/2010, 08/09/2010     HepB, Unspecified 03/17/2010, 08/09/2010     Influenza (H1N1) 11/24/2009     Influenza (IIV3) PF 11/05/1999, 09/01/2008, 10/01/2009, 10/26/2011, 09/15/2012, 10/01/2013, 10/01/2014, 10/01/2015     Influenza Vaccine IM 3yrs+ 4 Valent IIV4 09/25/2016, 09/29/2017, 10/01/2018     Influenza Vaccine, 3 YRS +, IM (QUADRIVALENT W/PRESERVATIVES) 09/29/2017     Mantoux Tuberculin Skin Test 02/15/2010     Pneumococcal 23 valent 11/25/2008     TDAP Vaccine (Adacel) 01/01/2004, 10/12/2012     TDAP Vaccine (Boostrix) 10/12/2012     Tetanus 04/05/2005       PHYSICAL EXAM:  LMP  (LMP Unknown)     Wt Readings from Last 1 Encounters:   05/28/19 73.2 kg (161 lb 4.8 oz)     Physical Exam:  Constitutional: no distress, comfortable, pleasant   Eyes: anicteric, normal extra-ocular movements   Ears, Nose and Throat: tympanic membranes clear, nose clear and free of lesions, throat clear, neck supple with full range of motion, no thyromegaly.   Cardiovascular: regular rate and rhythm, normal S1 and S2, no murmurs, rubs or gallops  Respiratory: clear to auscultation, no wheezes or crackles, normal breath sounds   Gastrointestinal: positive bowel sounds, nontender, no hepatosplenomegaly, no masses   Musculoskeletal: full range of motion, no edema   Skin: no concerning lesions, no jaundice   Neurological: cranial nerves intact, normal sensation in bilateral hands, bilateral resting hand tremor, decreased  strength right hand compared to the left hand  Psychological: appropriate mood   Lymphatic: no cervical lymphadenopathy    EKG:  EKG was not indicated based on risk assessment.              Horacio Sheridan MD

## 2019-05-31 NOTE — NURSING NOTE
Chief Complaint   Patient presents with     Pre-Op Exam     pt having carpal tunnel surgery on left wrist on 06/06/19 by Dr Francisco Fuentes at [facility]       Eileen He CMA at 2:14 PM on 5/31/2019.

## 2019-05-31 NOTE — PROGRESS NOTES
Surgeon (please enter first and last name): Dr Francisco Fuentes  Fax number for Preop Evaluation: [phone]  Location of Surgery: [facility] [place]  Date of Surgery: 06/06/19  Procedure: carpal tunnel on left wrist  History of reaction to anesthesia? NO

## 2019-05-31 NOTE — PROGRESS NOTES
Elizabeth Palma is 63 year old female here at the request of Dr. Fuentes for cardiovascular, pulmonary, and perioperative risk assessment prior to surgery.The intended surgical procedure is left carpal tunnel release. A copy of this note will be sent to the surgeon.    A/P:  Elizabeth Palma with a history of   Patient Active Problem List    Diagnosis Date Noted     Type 2 diabetes mellitus with peripheral neuropathy (H) 2015     Priority: High     -donor kidney transplant 2014     Priority: High     Major depressive disorder, recurrent episode, moderate (H) 11/15/2012     Priority: High     OP (osteoporosis) T score -3.8 2009     Priority: High     2007 T-score -3.7       Median sensory neuropathy, left 2019     Priority: Medium     Nerve conduction study down at [facility] in [place] on 3/7/19 shows: mild left median neuropathy at wrist, left median motor distal latency is normal, the left ulnar motor conduction velocity is abnormally slowed       Personal history of other drug therapy 2018     Priority: Medium     Marital conflict 2018     Priority: Medium     Suicidal ideation 2018     Priority: Medium     Narcissistic personality disorder (H) 2018     Priority: Medium     Age-related osteoporosis without current pathological fracture 08/10/2016     Priority: Medium     Right knee pain 2016     Priority: Medium     Left knee pain 2016     Priority: Medium     Posttraumatic stress disorder 2015     Priority: Medium     Nightmares associated with chronic post-traumatic stress disorder 10/27/2015     Priority: Medium     Pain in joint involving ankle and foot 2015     Priority: Medium     Senile osteoporosis 2015     Priority: Medium     Hyperparathyroidism (H) 2015     Priority: Medium     Problem list name updated by automated process. Provider to review       Hyperparathyroidism, secondary (H) 2015      Priority: Medium     Secondary hyperparathyroidism (H) 02/25/2015     Priority: Medium     Immunosuppressed status (H) 03/20/2014     Priority: Medium     Pain in joint, forearm -- L unhealed Fx 05/21/2013     Priority: Medium     CMC DJD(carpometacarpal degenerative joint disease), localized primary 03/05/2013     Priority: Medium     Generalized anxiety disorder 11/15/2012     Priority: Medium     Premature menopause age 37 07/10/2012     Priority: Medium     OCP (vaginal bldg)-->HT which she stopped 2 mo later documented at Jan 12, 2007 visit (age 51).       Sensory loss 10/17/2011     Priority: Medium     Bottom of feet; uncertain if there is a neuropathy per notes.        Hypertension 10/15/2011     Priority: Medium     Hyperlipidemia 10/15/2011     Priority: Medium     Abnormal MRI, cervical spine 10/15/2011     Priority: Medium     4/2011; mild changes noted. Study done for left arm symptoms  Impression:   1. Mild multilevel degenerative disc disease with no significant canal  or neural stenosis seen. motion artifact on the STIR images in these  are not interpretable. The remaining images were interpreted       Autosomal dominant polycystic kidney disease 07/05/2011     Priority: Medium     Overview:   Overview:   (Problem list name updated by automated process. Provider to review and confirm.)       presents prior to surgery for assessment of perioperative risk. The patient is at LOW risk for cardiovascular complications and at LOW risk for pulmonary complications of this MODERATE risk surgery.    The proposed surgical procedure is considered LOW risk.    REVISED CARDIAC RISK INDEX  The patient has the following serious cardiovascular risks for perioperative complications such as (MI, PE, VFib and 3  AV Block):  No serious cardiac risks  INTERPRETATION: 0 risks: Class I (very low risk - 0.4% complication rate)    I recommend that she proceed with the proposed procedure, without further diagnostic  evaluation    I asked her to stop her aspirin today which she will do.  She can resume after the surgery.    The patient has been instructed as to what to do with medications prior to surgery.    Laboratory studies:  None    Please contact our office if there are any further questions or information required about this patient.    Horacio Sheridan MD, FACP    --------------------------------------------------------    HPI:   Reason for surgery:    The left wrist is painful all of the time (ache) and keeps her up at night.  She does not get much numbness.  She does notice weakness - can't open bottles or hold things.  She got a cortisone which did not help so the surgery is hopefully going to help.  She had surgery on her right wrist earlier this year.    Cardiovascular Risk:  This patient does not have chest pain with ambulation or walking up a flight of stairs.  There is not any chest pain with exercise.  There is not a history of heart disease or valve problems.    Pulmonary Risk:  The patient does not have a history of lung problems.    Perioperative Complications:  The patient does not have a history of bleeding or clotting problems in the past. The patient has not had complications from past surgeries.  The patient does not have a family history of any anesthesia or surgical complications.    Answers for HPI/ROS submitted by the patient on 5/31/2019   General Symptoms: No  Skin Symptoms: No  HENT Symptoms: No  EYE SYMPTOMS: No  HEART SYMPTOMS: No  LUNG SYMPTOMS: No  INTESTINAL SYMPTOMS: No  URINARY SYMPTOMS: No  GYNECOLOGIC SYMPTOMS: No  BREAST SYMPTOMS: No  SKELETAL SYMPTOMS: No  BLOOD SYMPTOMS: No  NERVOUS SYSTEM SYMPTOMS: No  MENTAL HEALTH SYMPTOMS: Yes - medications help, she uses distraction - she has to work through it and it will go away, she sees therapist  Nervous or Anxious: Yes  Depression: Yes  Trouble sleeping: Yes  Trouble thinking or concentrating: No  Mood changes: Yes  Panic attacks: No      Family  History   Problem Relation Age of Onset     Mental Illness Other         family hx     Heart Disease Other      Diabetes Other      Cancer Other      Genetic Disorder Father      Mental Illness Father      Diabetes Father      Hypertension Father      Hyperlipidemia Mother      Diabetes Mother      Hypertension Mother      Mental Illness Other      Diabetes Other      Glaucoma No family hx of      Macular Degeneration No family hx of      Hypertension No family hx of      Social History     Socioeconomic History     Marital status:      Spouse name: Rahat     Number of children: 2     Years of education: Not on file     Highest education level: Not on file   Occupational History     Comment: part-time   Social Needs     Financial resource strain: Not on file     Food insecurity:     Worry: Not on file     Inability: Not on file     Transportation needs:     Medical: Not on file     Non-medical: Not on file   Tobacco Use     Smoking status: Former Smoker     Smokeless tobacco: Never Used   Substance and Sexual Activity     Alcohol use: No     Alcohol/week: 0.0 oz     Drug use: No     Sexual activity: Yes     Partners: Male     Birth control/protection: None     Comment: 1 partner   Lifestyle     Physical activity:     Days per week: Not on file     Minutes per session: Not on file     Stress: Not on file   Relationships     Social connections:     Talks on phone: Not on file     Gets together: Not on file     Attends [employer] service: Not on file     Active member of club or organization: Not on file     Attends meetings of clubs or organizations: Not on file     Relationship status: Not on file     Intimate partner violence:     Fear of current or ex partner: Not on file     Emotionally abused: Not on file     Physically abused: Not on file     Forced sexual activity: Not on file   Other Topics Concern     Parent/sibling w/ CABG, MI or angioplasty before 65F 55M? Not Asked   Social History Narrative     Not  on file     Patient Active Problem List   Diagnosis     Hypertension     Hyperlipidemia     Abnormal MRI, cervical spine     Sensory loss     Premature menopause age 37     OP (osteoporosis) T score -3.8     Major depressive disorder, recurrent episode, moderate (H)     Generalized anxiety disorder     CMC DJD(carpometacarpal degenerative joint disease), localized primary     Pain in joint, forearm -- L unhealed Fx     -donor kidney transplant     Immunosuppressed status (H)     Hyperparathyroidism, secondary (H)     Hyperparathyroidism (H)     Senile osteoporosis     Pain in joint involving ankle and foot     Nightmares associated with chronic post-traumatic stress disorder     Type 2 diabetes mellitus with peripheral neuropathy (H)     Posttraumatic stress disorder     Right knee pain     Left knee pain     Age-related osteoporosis without current pathological fracture     Narcissistic personality disorder (H)     Personal history of other drug therapy     Median sensory neuropathy, left     Marital conflict     Suicidal ideation     Autosomal dominant polycystic kidney disease     Secondary hyperparathyroidism (H)     Past Surgical History:   Procedure Laterality Date     ABDOMEN SURGERY       ANKLE SURGERY       C TRANSPLANTATION OF KIDNEY  3/2014     C/SECTION, LOW TRANSVERSE      x 2     CHOLECYSTECTOMY       COLONOSCOPY       ESOPHAGOSCOPY, GASTROSCOPY, DUODENOSCOPY (EGD), COMBINED N/A 2015    Procedure: COMBINED ESOPHAGOSCOPY, GASTROSCOPY, DUODENOSCOPY (EGD);  Surgeon: Sky Davey MD;  Location:  GI     ESOPHAGOSCOPY, GASTROSCOPY, DUODENOSCOPY (EGD), COMBINED N/A 2015    Procedure: COMBINED ESOPHAGOSCOPY, GASTROSCOPY, DUODENOSCOPY (EGD), BIOPSY SINGLE OR MULTIPLE;  Surgeon: Sky Davey MD;  Location:  GI     EYE SURGERY       LAPAROSCOPY, SURGICAL; REPAIR INCISIONAL OR VENTRAL HERNIA       ORTHOPEDIC SURGERY       HERMINIA EN Y BOWEL       WRIST SURGERY        Current Outpatient Medications   Medication     acetaminophen (TYLENOL) 325 MG tablet     acetylcysteine (N-ACETYL-L-CYSTEINE) 600 MG CAPS capsule     albuterol (PROAIR HFA/PROVENTIL HFA/VENTOLIN HFA) 108 (90 Base) MCG/ACT inhaler     ARIPiprazole (ABILIFY) 2 MG tablet     aspirin EC 81 MG EC tablet     blood glucose monitoring (ACCU-CHEK RALPH PLUS) meter device kit     blood glucose monitoring (NO BRAND SPECIFIED) meter device kit     blood glucose monitoring (NO BRAND SPECIFIED) test strip     blood glucose monitoring (SOFTCLIX) lancets     Cholecalciferol (VITAMIN D) 1000 UNITS capsule     cyanocolbalamin (VITAMIN  B-12) 1000 MCG tablet     cycloSPORINE modified (GENERIC EQUIVALENT) 25 MG capsule     cycloSPORINE modified (GENERIC EQUIVALENT) 25 MG capsule     econazole nitrate 1 % cream     estradiol (VAGIFEM) 10 MCG TABS vaginal tablet     fluticasone (FLONASE) 50 MCG/ACT nasal spray     furosemide (LASIX) 20 MG tablet     latanoprost (XALATAN) 0.005 % ophthalmic solution     metFORMIN (GLUCOPHAGE-XR) 500 MG 24 hr tablet     mycophenolate (GENERIC EQUIVALENT) 250 MG capsule     naltrexone (DEPADE/REVIA) 50 MG tablet     omeprazole (PRILOSEC) 40 MG DR capsule     ondansetron (ZOFRAN-ODT) 4 MG ODT tab     order for DME     Polyvinyl Alcohol-Povidone (REFRESH OP)     prazosin (MINIPRESS) 2 MG capsule     prazosin (MINIPRESS) 5 MG capsule     simvastatin (ZOCOR) 20 MG tablet     sulfamethoxazole-trimethoprim (BACTRIM/SEPTRA) 400-80 MG per tablet     topiramate (TOPAMAX) 200 MG tablet     Vaginal Lubricant (REPLENS) GEL     vilazodone (VIIBRYD) 40 MG TABS tablet     Furosemide (LASIX) 20 MG tablet     No current facility-administered medications for this visit.      Immunization History   Administered Date(s) Administered     FLU 6-35 months 10/04/2015, 09/24/2016     Hep B, Peds or Adolescent 02/04/2010     HepB 02/04/2010, 03/17/2010, 08/09/2010     HepB, Unspecified 03/17/2010, 08/09/2010     Influenza  (H1N1) 11/24/2009     Influenza (IIV3) PF 11/05/1999, 09/01/2008, 10/01/2009, 10/26/2011, 09/15/2012, 10/01/2013, 10/01/2014, 10/01/2015     Influenza Vaccine IM 3yrs+ 4 Valent IIV4 09/25/2016, 09/29/2017, 10/01/2018     Influenza Vaccine, 3 YRS +, IM (QUADRIVALENT W/PRESERVATIVES) 09/29/2017     Mantoux Tuberculin Skin Test 02/15/2010     Pneumococcal 23 valent 11/25/2008     TDAP Vaccine (Adacel) 01/01/2004, 10/12/2012     TDAP Vaccine (Boostrix) 10/12/2012     Tetanus 04/05/2005       PHYSICAL EXAM:  LMP  (LMP Unknown)     Wt Readings from Last 1 Encounters:   05/28/19 73.2 kg (161 lb 4.8 oz)     Physical Exam:  Constitutional: no distress, comfortable, pleasant   Eyes: anicteric, normal extra-ocular movements   Ears, Nose and Throat: tympanic membranes clear, nose clear and free of lesions, throat clear, neck supple with full range of motion, no thyromegaly.   Cardiovascular: regular rate and rhythm, normal S1 and S2, no murmurs, rubs or gallops  Respiratory: clear to auscultation, no wheezes or crackles, normal breath sounds   Gastrointestinal: positive bowel sounds, nontender, no hepatosplenomegaly, no masses   Musculoskeletal: full range of motion, no edema   Skin: no concerning lesions, no jaundice   Neurological: cranial nerves intact, normal sensation in bilateral hands, bilateral resting hand tremor, decreased  strength right hand compared to the left hand  Psychological: appropriate mood   Lymphatic: no cervical lymphadenopathy    EKG:  EKG was not indicated based on risk assessment.

## 2019-06-03 ENCOUNTER — HOSPITAL ENCOUNTER (OUTPATIENT)
Dept: PHYSICAL THERAPY | Facility: CLINIC | Age: 64
Setting detail: THERAPIES SERIES
End: 2019-06-03
Attending: PSYCHIATRY & NEUROLOGY
Payer: MEDICARE

## 2019-06-03 PROCEDURE — 97112 NEUROMUSCULAR REEDUCATION: CPT | Mod: GP | Performed by: PHYSICAL THERAPIST

## 2019-06-04 ENCOUNTER — OFFICE VISIT (OUTPATIENT)
Dept: PSYCHIATRY | Facility: CLINIC | Age: 64
End: 2019-06-04
Payer: MEDICARE

## 2019-06-04 VITALS
BODY MASS INDEX: 30.26 KG/M2 | SYSTOLIC BLOOD PRESSURE: 161 MMHG | HEART RATE: 105 BPM | DIASTOLIC BLOOD PRESSURE: 70 MMHG | WEIGHT: 157.6 LBS

## 2019-06-04 DIAGNOSIS — F33.1 MAJOR DEPRESSIVE DISORDER, RECURRENT EPISODE, MODERATE (H): Primary | ICD-10-CM

## 2019-06-04 DIAGNOSIS — F51.5 NIGHTMARES ASSOCIATED WITH CHRONIC POST-TRAUMATIC STRESS DISORDER: ICD-10-CM

## 2019-06-04 DIAGNOSIS — F43.12 NIGHTMARES ASSOCIATED WITH CHRONIC POST-TRAUMATIC STRESS DISORDER: ICD-10-CM

## 2019-06-04 ASSESSMENT — ANXIETY QUESTIONNAIRES
6. BECOMING EASILY ANNOYED OR IRRITABLE: SEVERAL DAYS
7. FEELING AFRAID AS IF SOMETHING AWFUL MIGHT HAPPEN: NOT AT ALL
GAD7 TOTAL SCORE: 4
5. BEING SO RESTLESS THAT IT IS HARD TO SIT STILL: NOT AT ALL
1. FEELING NERVOUS, ANXIOUS, OR ON EDGE: SEVERAL DAYS
2. NOT BEING ABLE TO STOP OR CONTROL WORRYING: SEVERAL DAYS
3. WORRYING TOO MUCH ABOUT DIFFERENT THINGS: SEVERAL DAYS

## 2019-06-04 ASSESSMENT — PATIENT HEALTH QUESTIONNAIRE - PHQ9
SUM OF ALL RESPONSES TO PHQ QUESTIONS 1-9: 7
5. POOR APPETITE OR OVEREATING: NOT AT ALL

## 2019-06-04 ASSESSMENT — PAIN SCALES - GENERAL: PAINLEVEL: NO PAIN (0)
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
## 2019-06-05 ASSESSMENT — ANXIETY QUESTIONNAIRES: GAD7 TOTAL SCORE: 4

## 2019-06-06 DIAGNOSIS — E11.42 TYPE 2 DIABETES MELLITUS WITH DIABETIC POLYNEUROPATHY, WITHOUT LONG-TERM CURRENT USE OF INSULIN (H): ICD-10-CM

## 2019-06-06 NOTE — TELEPHONE ENCOUNTER
metFORMIN (GLUCOPHAGE-XR) 500 MG 24 hr tablet      Last Written Prescription Date:  12/7/18  Last Fill Quantity: 270,   # refills: 1  Last Office Visit : 5/31/19  Future Office visit:  none    Routing refill request to provider for review/approval because:  Blood pressure >140/90  Overdue LDL

## 2019-06-07 RX ORDER — METFORMIN HCL 500 MG
TABLET, EXTENDED RELEASE 24 HR ORAL
Qty: 270 TABLET | Refills: 1 | Status: SHIPPED | OUTPATIENT
Start: 2019-06-07 | End: 2019-11-26

## 2019-06-14 DIAGNOSIS — F33.1 MAJOR DEPRESSIVE DISORDER, RECURRENT EPISODE, MODERATE (H): ICD-10-CM

## 2019-06-17 RX ORDER — ARIPIPRAZOLE 2 MG/1
2 TABLET ORAL DAILY
Qty: 30 TABLET | Refills: 0 | Status: SHIPPED | OUTPATIENT
Start: 2019-06-17 | End: 2019-07-16

## 2019-06-17 NOTE — PROGRESS NOTES
[facility]        OUTPATIENT PHYSICAL THERAPY FUNCTIONAL EVALUATION  PLAN OF TREATMENT FOR OUTPATIENT REHABILITATION  (COMPLETE FOR INITIAL CLAIMS ONLY)  Patient's Last Name, First Name, M.I.  YOB: 1955  Elizabeth Palma     Provider's Name   [facility]   Medical Record No.  [id number]     Start of Care Date:  05/02/19   Onset Date:  04/10/19   Type:     _X__PT   ____OT  ____SLP Medical Diagnosis:   Dysequilibrium     PT Diagnosis:  Balance impairment Visits from SOC:  1                              __________________________________________________________________________________  Plan of Treatment/Functional Goals:  balance training, gait training, neuromuscular re-education, strengthening, transfer training           GOALS  6 MWT  Elizabeth will ambulate 1,400 ft on 6 MWT test using LRAD in order to demonstrate improved tolerance for community ambulation.  07/01/19    FGA  Elizabeth will score >22 on FGA indicating decreased fall risk with dynamic balance for performance of ADLs/IADLs.  07/01/19    TUG  Elizabeth will perform TUG <13 seconds without use of walker to demonstrate safety for ambulation without use of AD.  07/01/19    30 second sit to stand  Elizabeth will perform at least 10 reps of sit to stands to demonstrate functional LE strength for negotiation of stairs.  07/01/19                 Therapy Frequency:  1 time/week   Predicted Duration of Therapy Intervention:  60 days    Beatriz Michel, PT                                    I CERTIFY THE NEED FOR THESE SERVICES FURNISHED UNDER        THIS PLAN OF TREATMENT AND WHILE UNDER MY CARE     (Physician co-signature of this document indicates review and certification of the therapy plan).                Certification Date From:    5/2/19  Certification Date To: 7/1/19       Referring Provider:  Dr. Munoz  Issa    Initial Assessment  See Epic Evaluation- Start of Care Date: 05/02/19

## 2019-06-17 NOTE — ADDENDUM NOTE
Encounter addended by: Beatriz Hummel PT on: 6/17/2019 10:47 AM   Actions taken: Document created, Document edited

## 2019-06-17 NOTE — PROGRESS NOTES
05/02/19 1300   Quick Adds   Type of Visit Initial OP PT Evaluation   General Information   Start of Care Date 05/02/19   Referring Physician Dr. Tammy Parsons   Orders Evaluate and Treat as Indicated   Order Date 04/10/19   Medical Diagnosis Disequilibrium   Onset of illness/injury or Date of Surgery 04/10/19   Precautions/Limitations no known precautions/limitations   Surgical/Medical history reviewed Yes   Pertinent history of current problem (include personal factors and/or comorbidities that impact the POC) Elizabeth presents to therapy session with orders for PT to address tremors and balance issues. MRI and CT tests performed. No findings from imaging. She began experiencing tremors of LEs about 2 months ago after starting cyclosporine. D/t the tremors she feels unsteady and now walks with a 4 wheeled walker most of the time. She has not had any falls but feels nervous without the walker. She has limited her physical activity d/t the balance and has been having issues with performing work duties d/t poor standing tolerance. PMH includes kidney transplant in 2014.   Prior level of function comment did not use any AD, was walking longer distances for exercise   Diagnostic Tests MRI;CT Scan   MRI Results unremarkable   CT Results unremarkable   Patient role/Employment history Employed  (part time teacher)   Living environment [place]   Current Assistive Devices Four Wheeled Walker   Patient/Family Goals Statement Get rid of walker   Fall Risk Screen   Fall screen completed by PT   Have you fallen 2 or more times in the past year? No   Have you fallen and had an injury in the past year? No   Timed Up and Go score (seconds) 12.53 with walker, 15.75 without walker   Is patient a fall risk? Yes   Fall screen comments FGA, TUG without walker   Pain   Pain comments some pain in LEs after activity   Cognitive Status Examination   Level of Consciousness alert   Follows Commands and Answers Questions 100% of the  time   Observation   Observation resting tremor, not constant. Worsens occassionally with movement   Posture   Posture Forward head position;Protracted shoulders   Range of Motion (ROM)   ROM Comment LEs WNL   Strength   Strength Comments B hip flexion, hip abduction and extension 4/5, B knee extension and flexion 4/5, B ankle DF 4+/5, B ankle PF (modifed sitting position) 4-/5   Bed Mobility   Bed Mobility Comments independent with increased time   Transfer Skills   Transfer Comments independent with increased time, able to get up from chair without use of airs   Gait   Gait Comments step length and gait speed improved with use of walker   Gait Special Tests Functional Gait Assessment Score out of 30   Score out of 30 17   Gait Special Tests Six Minute Walk Test   Feet 1230 Feet   Balance Special Tests Sit to Stand Reps in 30 Seconds   Reps in 30 seconds 5   Height standard chair   Sensory Examination   Sensory Perception Comments sensation intact on LEs   Coordination   Coordination Comments heel to shin normal, difficulty with increased speed of single and alternating step taps   Muscle Tone   Muscle Tone Comments normal tone, no resistance to PROM at slow and fast speeds   Modality Interventions   Planned Modality Interventions Comments as deemed apporpriate by PT   Planned Therapy Interventions   Planned Therapy Interventions balance training;gait training;neuromuscular re-education;strengthening;transfer training   Clinical Impression   Criteria for Skilled Therapeutic Interventions Met yes, treatment indicated   PT Diagnosis Balance impairment   Influenced by the following impairments tremor, fear of falls, unsteadiness, weakness, decreased activity tolerance   Functional limitations due to impairments work, static/dynamic balance, ADLs/IADLs, physical activity, ambulation   Clinical Presentation Evolving/Changing   Clinical Presentation Rationale fear avoidance behavior, history of transplant, tremor, TUG,  FGA, 6 MWT, 30 second sit to stand   Clinical Decision Making (Complexity) Moderate complexity   Therapy Frequency 1 time/week   Predicted Duration of Therapy Intervention (days/wks) 60 days   Risk & Benefits of therapy have been explained Yes   Patient, Family & other staff in agreement with plan of care Yes   Goal 1   Goal Identifier 6 MWT   Goal Description Elizabeth will ambulate 1,400 ft on 6 MWT test using LRAD in order to demonstrate improved tolerance for community ambulation.   Target Date 07/01/19   Goal 2   Goal Identifier FGA   Goal Description Elizabeth will score >22 on FGA indicating decreased fall risk with dynamic balance for performance of ADLs/IADLs.   Target Date 07/01/19   Goal 3   Goal Identifier TUG   Goal Description Elizabeth will perform TUG <13 seconds without use of walker to demonstrate safety for ambulation without use of AD.   Target Date 07/01/19   Goal 4   Goal Identifier 30 second sit to stand   Goal Description Elizabeth will perform at least 10 reps of sit to stands to demonstrate functional LE strength for negotiation of stairs.   Target Date 07/01/19   Total Evaluation Time   PT Eval, Moderate Complexity Minutes (05191) 35

## 2019-06-17 NOTE — ADDENDUM NOTE
Encounter addended by: Beatriz Hummel PT on: 6/17/2019 10:46 AM   Actions taken: Flowsheet accepted, Sign clinical note

## 2019-06-24 ENCOUNTER — HOSPITAL ENCOUNTER (OUTPATIENT)
Dept: PHYSICAL THERAPY | Facility: CLINIC | Age: 64
Setting detail: THERAPIES SERIES
End: 2019-06-24
Attending: PSYCHIATRY & NEUROLOGY
Payer: MEDICARE

## 2019-06-24 PROCEDURE — 97110 THERAPEUTIC EXERCISES: CPT | Mod: GP | Performed by: PHYSICAL THERAPIST

## 2019-06-24 PROCEDURE — 97112 NEUROMUSCULAR REEDUCATION: CPT | Mod: GP | Performed by: PHYSICAL THERAPIST

## 2019-06-26 ENCOUNTER — TRANSFERRED RECORDS (OUTPATIENT)
Dept: HEALTH INFORMATION MANAGEMENT | Facility: CLINIC | Age: 64
End: 2019-06-26

## 2019-06-30 DIAGNOSIS — F33.1 MAJOR DEPRESSIVE DISORDER, RECURRENT EPISODE, MODERATE (H): ICD-10-CM

## 2019-07-02 RX ORDER — VILAZODONE HYDROCHLORIDE 40 MG/1
TABLET ORAL
Qty: 30 TABLET | Refills: 0 | Status: SHIPPED | OUTPATIENT
Start: 2019-07-02 | End: 2019-07-16

## 2019-07-02 NOTE — TELEPHONE ENCOUNTER
Last seen: 6/4/19  RTC: 1 month  Cancel: none  No-show: none  Next appt: 7/16/19    Incoming refill from Pharmacy via fax    Medication requested: Viibryd 40 mg   Directions: Take 1 tablet by mouth daily   Qty: 30  Last refilled: 2/12/19 with three refills     Medication refill approved per refill protocol

## 2019-07-05 DIAGNOSIS — E66.01 MORBID OBESITY DUE TO EXCESS CALORIES (H): ICD-10-CM

## 2019-07-08 ENCOUNTER — TELEPHONE (OUTPATIENT)
Dept: ENDOCRINOLOGY | Facility: CLINIC | Age: 64
End: 2019-07-08

## 2019-07-08 NOTE — TELEPHONE ENCOUNTER
[facility] Call Center    Phone Message    May a detailed message be left on voicemail: yes    Reason for Call: Other: Pt's  calling to inquired about drug therapy? Pt states that [facility] normally puts in an order to have it done and that pt usually gets it done every year. It is an order for an injection that helps strengthen her bones. Please call Shawn back to assist.     Action Taken: Message routed to:  [facility] ([facility] endo

## 2019-07-09 RX ORDER — TOPIRAMATE 200 MG/1
200 TABLET, FILM COATED ORAL 2 TIMES DAILY
Qty: 60 TABLET | Refills: 2 | Status: SHIPPED | OUTPATIENT
Start: 2019-07-09 | End: 2019-10-09

## 2019-07-16 ENCOUNTER — OFFICE VISIT (OUTPATIENT)
Dept: INTERNAL MEDICINE | Facility: CLINIC | Age: 64
End: 2019-07-16
Payer: MEDICARE

## 2019-07-16 ENCOUNTER — OFFICE VISIT (OUTPATIENT)
Dept: PSYCHIATRY | Facility: CLINIC | Age: 64
End: 2019-07-16
Payer: MEDICARE

## 2019-07-16 VITALS
DIASTOLIC BLOOD PRESSURE: 81 MMHG | HEART RATE: 75 BPM | BODY MASS INDEX: 31.11 KG/M2 | SYSTOLIC BLOOD PRESSURE: 146 MMHG | WEIGHT: 162 LBS

## 2019-07-16 VITALS
BODY MASS INDEX: 31.09 KG/M2 | RESPIRATION RATE: 18 BRPM | OXYGEN SATURATION: 98 % | HEART RATE: 70 BPM | TEMPERATURE: 98.2 F | WEIGHT: 161.9 LBS | SYSTOLIC BLOOD PRESSURE: 113 MMHG | DIASTOLIC BLOOD PRESSURE: 69 MMHG

## 2019-07-16 DIAGNOSIS — F51.5 NIGHTMARES ASSOCIATED WITH CHRONIC POST-TRAUMATIC STRESS DISORDER: ICD-10-CM

## 2019-07-16 DIAGNOSIS — F33.1 MAJOR DEPRESSIVE DISORDER, RECURRENT EPISODE, MODERATE (H): Primary | ICD-10-CM

## 2019-07-16 DIAGNOSIS — G47.19 EXCESSIVE DAYTIME SLEEPINESS: Primary | ICD-10-CM

## 2019-07-16 DIAGNOSIS — F43.12 NIGHTMARES ASSOCIATED WITH CHRONIC POST-TRAUMATIC STRESS DISORDER: ICD-10-CM

## 2019-07-16 RX ORDER — PRAZOSIN HYDROCHLORIDE 5 MG/1
CAPSULE ORAL
Qty: 90 CAPSULE | Refills: 3 | Status: SHIPPED | OUTPATIENT
Start: 2019-07-16 | End: 2019-09-19

## 2019-07-16 RX ORDER — PRAZOSIN HYDROCHLORIDE 2 MG/1
CAPSULE ORAL
Qty: 30 CAPSULE | Refills: 3 | Status: SHIPPED | OUTPATIENT
Start: 2019-07-16 | End: 2019-09-19

## 2019-07-16 RX ORDER — VILAZODONE HYDROCHLORIDE 40 MG/1
40 TABLET ORAL DAILY
Qty: 30 TABLET | Refills: 3 | Status: SHIPPED | OUTPATIENT
Start: 2019-07-16 | End: 2019-09-19

## 2019-07-16 RX ORDER — ARIPIPRAZOLE 2 MG/1
2 TABLET ORAL DAILY
Qty: 30 TABLET | Refills: 3 | Status: SHIPPED | OUTPATIENT
Start: 2019-07-16 | End: 2019-09-19

## 2019-07-16 ASSESSMENT — PATIENT HEALTH QUESTIONNAIRE - PHQ9: SUM OF ALL RESPONSES TO PHQ QUESTIONS 1-9: 5

## 2019-07-16 ASSESSMENT — PAIN SCALES - GENERAL: PAINLEVEL: NO PAIN (0)

## 2019-07-16 NOTE — LETTER
Elizabeth Palma  [address]   [facility] [address]  : 1955  MRN:  [id number]      2019          To Whom It May Concern,    Elizabeth Palma is a patient at the [facility].  She has arthritis of her joints and had difficulty getting in and out of the tub safely.  A walk in tub would be safer for her and will prevent falls and pain.      If you have any questions, please do not hesitate to contact me.    Sincerely,          Horacio Sheridan MD
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
## 2019-07-16 NOTE — PROGRESS NOTES
ASSESSMENT/PLAN:  Patient experiencing extreme sleepiness after starting naltrexone.  The risk of this side effect is small but is temporally related to her symptoms.  Therefore, I will have her stop and see what happens.  I have suggested going from 50mg BID to daily x 1 week and then off.  If this is not effective, then I suggest she get a sleep study.    Total time spent 25 minutes.  More than 50% of the time spent with Ms. Palma on counseling / coordinating her care    Horacio Sheridan MD, FACP      Chief complaint:  Elizabeth Palma is a 63 year old female presents for   Chief Complaint   Patient presents with     Fatigue     Here for fatigue x 2 months which fatigue has gotten worse        SUBJECTIVE:  She is literally tired all of the time.  She sleeps 10 hours (11p-12a bedtime and wakes up 8am) but she wakes up and eats and drinks in the middle of the night.  She wakes up at 3:30p and then has a hard time falling back asleep.  She is not sure what wakes her up.  She wakes up feeling rested but then is tired in 1 hour.  She then falls asleep in the day.  She will also take a nap in the day.  No falling sleep at dinner table or while driving.  This has been since mid-May when she takes topiramate (started ) and naltrexone (started May 2, 2019) for appetite control.  She is not losing weight so is not sure she needs it.  She takes them both morning and night.    Medications and allergies were reviewed by me today.       Patient Active Problem List    Diagnosis Date Noted     Type 2 diabetes mellitus with peripheral neuropathy (H) 2015     Priority: High     -donor kidney transplant 2014     Priority: High     Major depressive disorder, recurrent episode, moderate (H) 11/15/2012     Priority: High     OP (osteoporosis) T score -3.8 2009     Priority: High     2007 T-score -3.7       Median sensory neuropathy, left 2019     Priority: Medium     Nerve conduction study down at [facility] in [place] on 3/7/19 shows: mild left median neuropathy at wrist, left median motor distal latency is normal, the left ulnar motor conduction velocity is abnormally slowed       Personal history of other drug therapy 11/13/2018     Priority: Medium     Marital conflict 08/08/2018     Priority: Medium     Suicidal ideation 08/07/2018     Priority: Medium     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     Priority: Medium     Age-related osteoporosis without current pathological fracture 08/10/2016     Priority: Medium     Right knee pain 02/08/2016     Priority: Medium     Left knee pain 02/08/2016     Priority: Medium     Posttraumatic stress disorder 11/24/2015     Priority: Medium     Nightmares associated with chronic post-traumatic stress disorder 10/27/2015     Priority: Medium     Pain in joint involving ankle and foot 07/13/2015     Priority: Medium     Senile osteoporosis 05/29/2015     Priority: Medium     Hyperparathyroidism (H) 02/26/2015     Priority: Medium     Problem list name updated by automated process. Provider to review       Hyperparathyroidism, secondary (H) 02/25/2015     Priority: Medium     Secondary hyperparathyroidism (H) 02/25/2015     Priority: Medium     Immunosuppressed status (H) 03/20/2014     Priority: Medium     Pain in joint, forearm -- L unhealed Fx 05/21/2013     Priority: Medium     CMC DJD(carpometacarpal degenerative joint disease), localized primary 03/05/2013     Priority: Medium     Generalized anxiety disorder 11/15/2012     Priority: Medium     Premature menopause age 37 07/10/2012     Priority: Medium     OCP (vaginal bldg)-->HT which she stopped 2 mo later documented at Jan 12, 2007 visit (age 51).       Sensory loss 10/17/2011     Priority: Medium     Bottom of feet; uncertain if there is a neuropathy per notes.        Hypertension 10/15/2011     Priority: Medium     Hyperlipidemia 10/15/2011     Priority: Medium     Abnormal MRI, cervical spine  10/15/2011     Priority: Medium     4/2011; mild changes noted. Study done for left arm symptoms  Impression:   1. Mild multilevel degenerative disc disease with no significant canal  or neural stenosis seen. motion artifact on the STIR images in these  are not interpretable. The remaining images were interpreted       Autosomal dominant polycystic kidney disease 07/05/2011     Priority: Medium     Overview:   Overview:   (Problem list name updated by automated process. Provider to review and confirm.)         PHYSICAL EXAM:  /69 (BP Location: Right arm, Patient Position: Sitting, Cuff Size: Adult Large)   Pulse 70   Temp 98.2  F (36.8  C) (Oral)   Resp 18   Wt 73.4 kg (161 lb 14.4 oz)   LMP  (LMP Unknown)   SpO2 98%   Breastfeeding? No   BMI 31.09 kg/m    Constitutional: no distress, comfortable, pleasant   Cardiovascular: regular rate and rhythm, normal S1 and S2, no murmurs  Respiratory: clear to auscultation, no wheezes or crackles, normal breath sounds

## 2019-07-16 NOTE — NURSING NOTE
Chief Complaint   Patient presents with     Fatigue     Here for fatigue x 2 months which fatigue has gotten worse       Bertin Doherty CMA ([facility]) at 10:42 AM on 7/16/2019

## 2019-07-18 ENCOUNTER — HOSPITAL ENCOUNTER (OUTPATIENT)
Dept: PHYSICAL THERAPY | Facility: CLINIC | Age: 64
Setting detail: THERAPIES SERIES
End: 2019-07-18
Attending: PSYCHIATRY & NEUROLOGY
Payer: MEDICARE

## 2019-07-18 PROCEDURE — 97112 NEUROMUSCULAR REEDUCATION: CPT | Mod: GP | Performed by: PHYSICAL THERAPIST

## 2019-07-18 PROCEDURE — 97110 THERAPEUTIC EXERCISES: CPT | Mod: GP | Performed by: PHYSICAL THERAPIST

## 2019-07-24 NOTE — TELEPHONE ENCOUNTER
During our last visit, the drug was switched to Prolia and seems active on my end.   We switched from reclast infusion due to bone loss while being treated with Reclast for 4 years.     I will have RN review and coordinate this infusion with the patient.     Lizbet Byers MD  Endocrinology Service

## 2019-07-24 NOTE — TELEPHONE ENCOUNTER
Left detailed msg and MyChart msg with recommendations from Dr Byers regarding prolia and scheduling from clinic coordinators.   Rasheeda Pierson RN on 7/24/2019 at 2:01 PM

## 2019-07-29 ENCOUNTER — ALLIED HEALTH/NURSE VISIT (OUTPATIENT)
Dept: SURGERY | Facility: CLINIC | Age: 64
End: 2019-07-29
Payer: MEDICARE

## 2019-07-29 ENCOUNTER — OFFICE VISIT (OUTPATIENT)
Dept: PODIATRY | Facility: CLINIC | Age: 64
End: 2019-07-29
Payer: MEDICARE

## 2019-07-29 VITALS — WEIGHT: 159.1 LBS | BODY MASS INDEX: 30.55 KG/M2

## 2019-07-29 DIAGNOSIS — L60.2 ONYCHAUXIS: Primary | ICD-10-CM

## 2019-07-29 DIAGNOSIS — Z94.0 KIDNEY TRANSPLANTED: ICD-10-CM

## 2019-07-29 DIAGNOSIS — S93.402D SPRAIN OF LEFT ANKLE, UNSPECIFIED LIGAMENT, SUBSEQUENT ENCOUNTER: ICD-10-CM

## 2019-07-29 DIAGNOSIS — Z48.298 AFTERCARE FOLLOWING ORGAN TRANSPLANT: ICD-10-CM

## 2019-07-29 DIAGNOSIS — E11.49 TYPE II OR UNSPECIFIED TYPE DIABETES MELLITUS WITH NEUROLOGICAL MANIFESTATIONS, NOT STATED AS UNCONTROLLED(250.60) (H): ICD-10-CM

## 2019-07-29 LAB
ANION GAP SERPL CALCULATED.3IONS-SCNC: 6 MMOL/L (ref 3–14)
BUN SERPL-MCNC: 15 MG/DL (ref 7–30)
CALCIUM SERPL-MCNC: 9.4 MG/DL (ref 8.5–10.1)
CHLORIDE SERPL-SCNC: 107 MMOL/L (ref 94–109)
CO2 SERPL-SCNC: 24 MMOL/L (ref 20–32)
CREAT SERPL-MCNC: 1.06 MG/DL (ref 0.52–1.04)
CREAT UR-MCNC: 195 MG/DL
CYCLOSPORINE BLD LC/MS/MS-MCNC: 117 UG/L (ref 50–400)
ERYTHROCYTE [DISTWIDTH] IN BLOOD BY AUTOMATED COUNT: 16.6 % (ref 10–15)
GFR SERPL CREATININE-BSD FRML MDRD: 56 ML/MIN/{1.73_M2}
GLUCOSE SERPL-MCNC: 145 MG/DL (ref 70–99)
HCT VFR BLD AUTO: 38.1 % (ref 35–47)
HGB BLD-MCNC: 11.2 G/DL (ref 11.7–15.7)
MCH RBC QN AUTO: 24 PG (ref 26.5–33)
MCHC RBC AUTO-ENTMCNC: 29.4 G/DL (ref 31.5–36.5)
MCV RBC AUTO: 82 FL (ref 78–100)
PLATELET # BLD AUTO: 180 10E9/L (ref 150–450)
POTASSIUM SERPL-SCNC: 3.8 MMOL/L (ref 3.4–5.3)
PROT UR-MCNC: 0.24 G/L
PROT/CREAT 24H UR: 0.13 G/G CR (ref 0–0.2)
RBC # BLD AUTO: 4.66 10E12/L (ref 3.8–5.2)
SODIUM SERPL-SCNC: 137 MMOL/L (ref 133–144)
TME LAST DOSE: 2000 H
WBC # BLD AUTO: 3 10E9/L (ref 4–11)

## 2019-07-29 PROCEDURE — 80158 DRUG ASSAY CYCLOSPORINE: CPT | Performed by: INTERNAL MEDICINE

## 2019-07-29 PROCEDURE — 99213 OFFICE O/P EST LOW 20 MIN: CPT | Performed by: PODIATRIST

## 2019-07-29 PROCEDURE — 87799 DETECT AGENT NOS DNA QUANT: CPT | Performed by: INTERNAL MEDICINE

## 2019-07-29 NOTE — PROGRESS NOTES
Past Medical History:   Diagnosis Date     Abnormal MRI, cervical spine 10/15/2011    2011; mild changes noted. Study done for left arm symptoms Impression:  1. Mild multilevel degenerative disc disease with no significant canal or neural stenosis seen. motion artifact on the STIR images in these are not interpretable. The remaining images were interpreted      Autosomal dominant polycystic kidney disease 2011     (Problem list name updated by automated process. Provider to review and confirm.)     CMC DJD(carpometacarpal degenerative joint disease), localized primary 3/5/2013     -donor kidney transplant 3/20/2014     Depressive disorder 11/15/2012     DM type 2 (diabetes mellitus, type 2) (H) 2013     Encounter for long-term (current) use of other medications 2015     Family history of tremor 10/17/2011     Gastroesophageal reflux disease      Generalized anxiety disorder 11/15/2012     Glaucoma      Hyperlipidemia 10/15/2011     Hyperparathyroidism, secondary (H) 2015     Hypertension     resolved     Immunosuppressed status (H) 3/20/2014     Major depressive disorder, recurrent episode, moderate (H) 11/15/2012     Obesity (BMI 30-39.9)      OP (osteoporosis) T score -3.8 2009 T-score -3.7      LION (obstructive sleep apnoea) 10/15/2012    intol to cpa     Pain in joint, forearm -- L unhealed Fx 2013     Premature menopause age 37 7/10/2012    OCP (vaginal bldg)-->HT which she stopped 2 mo later documented at 2007 visit (age 51).      Restless leg syndrome      Rib fractures 2013     Sensory loss 10/17/2011    Bottom of feet; uncertain if there is a neuropathy per notes.       Stiffness of joint, not elsewhere classified, hand 3/5/2013     Tremor 10/15/2011    head     Uncomplicated asthma      Patient Active Problem List   Diagnosis     Hypertension     Hyperlipidemia     Abnormal MRI, cervical spine     Sensory loss     Premature menopause age 37      OP (osteoporosis) T score -3.8     Major depressive disorder, recurrent episode, moderate (H)     Generalized anxiety disorder     CMC DJD(carpometacarpal degenerative joint disease), localized primary     Pain in joint, forearm -- L unhealed Fx     -donor kidney transplant     Immunosuppressed status (H)     Hyperparathyroidism, secondary (H)     Hyperparathyroidism (H)     Senile osteoporosis     Pain in joint involving ankle and foot     Nightmares associated with chronic post-traumatic stress disorder     Type 2 diabetes mellitus with peripheral neuropathy (H)     Posttraumatic stress disorder     Right knee pain     Left knee pain     Age-related osteoporosis without current pathological fracture     Narcissistic personality disorder (H)     Personal history of other drug therapy     Median sensory neuropathy, left     Marital conflict     Suicidal ideation     Autosomal dominant polycystic kidney disease     Secondary hyperparathyroidism (H)     Past Surgical History:   Procedure Laterality Date     ABDOMEN SURGERY       ANKLE SURGERY       C TRANSPLANTATION OF KIDNEY  3/2014     C/SECTION, LOW TRANSVERSE      x 2     CHOLECYSTECTOMY       COLONOSCOPY       ESOPHAGOSCOPY, GASTROSCOPY, DUODENOSCOPY (EGD), COMBINED N/A 2015    Procedure: COMBINED ESOPHAGOSCOPY, GASTROSCOPY, DUODENOSCOPY (EGD);  Surgeon: Sky Davey MD;  Location:  GI     ESOPHAGOSCOPY, GASTROSCOPY, DUODENOSCOPY (EGD), COMBINED N/A 2015    Procedure: COMBINED ESOPHAGOSCOPY, GASTROSCOPY, DUODENOSCOPY (EGD), BIOPSY SINGLE OR MULTIPLE;  Surgeon: Sky Davey MD;  Location:  GI     EYE SURGERY       LAPAROSCOPY, SURGICAL; REPAIR INCISIONAL OR VENTRAL HERNIA       ORTHOPEDIC SURGERY       HERMINIA EN Y BOWEL       WRIST SURGERY       Social History     Socioeconomic History     Marital status:      Spouse name: Rahat     Number of children: 2     Years of education: Not on file     Highest education  level: Not on file   Occupational History     Comment: part-time   Social Needs     Financial resource strain: Not on file     Food insecurity:     Worry: Not on file     Inability: Not on file     Transportation needs:     Medical: Not on file     Non-medical: Not on file   Tobacco Use     Smoking status: Former Smoker     Smokeless tobacco: Never Used   Substance and Sexual Activity     Alcohol use: No     Alcohol/week: 0.0 oz     Drug use: No     Sexual activity: Yes     Partners: Male     Birth control/protection: None     Comment: 1 partner   Lifestyle     Physical activity:     Days per week: Not on file     Minutes per session: Not on file     Stress: Not on file   Relationships     Social connections:     Talks on phone: Not on file     Gets together: Not on file     Attends Islam service: Not on file     Active member of club or organization: Not on file     Attends meetings of clubs or organizations: Not on file     Relationship status: Not on file     Intimate partner violence:     Fear of current or ex partner: Not on file     Emotionally abused: Not on file     Physically abused: Not on file     Forced sexual activity: Not on file   Other Topics Concern     Parent/sibling w/ CABG, MI or angioplasty before 65F 55M? Not Asked   Social History Narrative     Not on file     Family History   Problem Relation Age of Onset     Mental Illness Other         family hx     Heart Disease Other      Diabetes Other      Cancer Other      Genetic Disorder Father      Mental Illness Father      Diabetes Father      Hypertension Father      Hyperlipidemia Mother      Diabetes Mother      Hypertension Mother      Mental Illness Other      Diabetes Other      Glaucoma No family hx of      Macular Degeneration No family hx of      Hypertension No family hx of      Lab Results   Component Value Date    A1C 7.2 04/24/2019    A1C 7.4 11/27/2018    A1C 6.4 01/19/2018    A1C 6.5 05/19/2017    A1C 6.2 04/13/2017      SUBJECTIVE FINDINGS:  A 63-year-old female returns to clinic for onychauxis bilaterally.  She is diabetic with peripheral neuropathy.  She relates she had an ankle sprain about 2-3 months ago.  She has seen an physician for it.  They got her an ankle sleeve.  She relates that is not really doing any good.  She has an appointment with them next week to get a cortisone shot.  She relates they took x-rays and there is no fracture.  She relates to numbness and tingling in her feet.  No ulcers or sores since I have seen her last.  She relates she needs her toenails cut.  She does have diabetic shoes.      OBJECTIVE FINDINGS:  DP and PT are 2/4 bilaterally.  She has some mild peripheral edema bilaterally.  There is no erythema, no odor, no calor bilaterally.  She has some lateral ankle edema on the left.  Negative anterior drawer sign.  She has pain on palpation of the lateral ankle and the anterior talofibular ligament course.  She has incurvated nails 1-5 bilaterally to differing degrees.  There is no erythema, no drainage, no odor, no calor bilaterally.      ASSESSMENT AND PLAN:  Onychauxis bilaterally.  She is diabetic with peripheral neuropathy.  Left ankle sprain.  Diagnosis and treatment options discussed with the patient.  All the nails were reduced bilaterally upon consent.  Ankle brace dispensed and use discussed with her.  She will follow up with her regular physician for her corticosteroid injection as directed and return to clinic and see me in about 3 months for diabetic foot care.

## 2019-07-29 NOTE — NURSING NOTE
Elizabeth Palma's chief complaint for this visit includes:  Chief Complaint   Patient presents with     Right Foot - Follow Up     Left Foot - Follow Up     PCP: Horacio Sheridan    Referring Provider:  No referring provider defined for this encounter.    LMP  (LMP Unknown)   Data Unavailable     Do you need any medication refills at today's visit? No    Yamile Lagunas LPN

## 2019-07-29 NOTE — LETTER
2019         RE: Elizabeth Palma  [address]  [place] [place] [address]        Dear Colleague,    Thank you for referring your patient, Elizabeth Palma, to the [facility]. Please see a copy of my visit note below.    Past Medical History:   Diagnosis Date     Abnormal MRI, cervical spine 10/15/2011    2011; mild changes noted. Study done for left arm symptoms Impression:  1. Mild multilevel degenerative disc disease with no significant canal or neural stenosis seen. motion artifact on the STIR images in these are not interpretable. The remaining images were interpreted      Autosomal dominant polycystic kidney disease 2011     (Problem list name updated by automated process. Provider to review and confirm.)     CMC DJD(carpometacarpal degenerative joint disease), localized primary 3/5/2013     -donor kidney transplant 3/20/2014     Depressive disorder 11/15/2012     DM type 2 (diabetes mellitus, type 2) (H) 2013     Encounter for long-term (current) use of other medications 2015     Family history of tremor 10/17/2011     Gastroesophageal reflux disease      Generalized anxiety disorder 11/15/2012     Glaucoma      Hyperlipidemia 10/15/2011     Hyperparathyroidism, secondary (H) 2015     Hypertension     resolved     Immunosuppressed status (H) 3/20/2014     Major depressive disorder, recurrent episode, moderate (H) 11/15/2012     Obesity (BMI 30-39.9)      OP (osteoporosis) T score -3.8 2009 T-score -3.7      LION (obstructive sleep apnoea) 10/15/2012    intol to cpa     Pain in joint, forearm -- L unhealed Fx 2013     Premature menopause age 37 7/10/2012    OCP (vaginal bldg)-->HT which she stopped 2 mo later documented at 2007 visit (age 51).      Restless leg syndrome      Rib fractures 2013     Sensory loss 10/17/2011    Bottom of feet; uncertain if there is a neuropathy per notes.       Stiffness of joint,  not elsewhere classified, hand 3/5/2013     Tremor 10/15/2011    head     Uncomplicated asthma      Patient Active Problem List   Diagnosis     Hypertension     Hyperlipidemia     Abnormal MRI, cervical spine     Sensory loss     Premature menopause age 37     OP (osteoporosis) T score -3.8     Major depressive disorder, recurrent episode, moderate (H)     Generalized anxiety disorder     CMC DJD(carpometacarpal degenerative joint disease), localized primary     Pain in joint, forearm -- L unhealed Fx     -donor kidney transplant     Immunosuppressed status (H)     Hyperparathyroidism, secondary (H)     Hyperparathyroidism (H)     Senile osteoporosis     Pain in joint involving ankle and foot     Nightmares associated with chronic post-traumatic stress disorder     Type 2 diabetes mellitus with peripheral neuropathy (H)     Posttraumatic stress disorder     Right knee pain     Left knee pain     Age-related osteoporosis without current pathological fracture     Narcissistic personality disorder (H)     Personal history of other drug therapy     Median sensory neuropathy, left     Marital conflict     Suicidal ideation     Autosomal dominant polycystic kidney disease     Secondary hyperparathyroidism (H)     Past Surgical History:   Procedure Laterality Date     ABDOMEN SURGERY       ANKLE SURGERY       C TRANSPLANTATION OF KIDNEY  3/2014     C/SECTION, LOW TRANSVERSE      x 2     CHOLECYSTECTOMY       COLONOSCOPY       ESOPHAGOSCOPY, GASTROSCOPY, DUODENOSCOPY (EGD), COMBINED N/A 2015    Procedure: COMBINED ESOPHAGOSCOPY, GASTROSCOPY, DUODENOSCOPY (EGD);  Surgeon: Sky Davey MD;  Location:  GI     ESOPHAGOSCOPY, GASTROSCOPY, DUODENOSCOPY (EGD), COMBINED N/A 2015    Procedure: COMBINED ESOPHAGOSCOPY, GASTROSCOPY, DUODENOSCOPY (EGD), BIOPSY SINGLE OR MULTIPLE;  Surgeon: Sky Davey MD;  Location:  GI     EYE SURGERY       LAPAROSCOPY, SURGICAL; REPAIR INCISIONAL OR VENTRAL  HERNIA       ORTHOPEDIC SURGERY       HERMINIA EN Y BOWEL  1990     WRIST SURGERY       Social History     Socioeconomic History     Marital status:      Spouse name: Rahat     Number of children: 2     Years of education: Not on file     Highest education level: Not on file   Occupational History     Comment: part-time   Social Needs     Financial resource strain: Not on file     Food insecurity:     Worry: Not on file     Inability: Not on file     Transportation needs:     Medical: Not on file     Non-medical: Not on file   Tobacco Use     Smoking status: Former Smoker     Smokeless tobacco: Never Used   Substance and Sexual Activity     Alcohol use: No     Alcohol/week: 0.0 oz     Drug use: No     Sexual activity: Yes     Partners: Male     Birth control/protection: None     Comment: 1 partner   Lifestyle     Physical activity:     Days per week: Not on file     Minutes per session: Not on file     Stress: Not on file   Relationships     Social connections:     Talks on phone: Not on file     Gets together: Not on file     Attends Confucianism service: Not on file     Active member of club or organization: Not on file     Attends meetings of clubs or organizations: Not on file     Relationship status: Not on file     Intimate partner violence:     Fear of current or ex partner: Not on file     Emotionally abused: Not on file     Physically abused: Not on file     Forced sexual activity: Not on file   Other Topics Concern     Parent/sibling w/ CABG, MI or angioplasty before 65F 55M? Not Asked   Social History Narrative     Not on file     Family History   Problem Relation Age of Onset     Mental Illness Other         family hx     Heart Disease Other      Diabetes Other      Cancer Other      Genetic Disorder Father      Mental Illness Father      Diabetes Father      Hypertension Father      Hyperlipidemia Mother      Diabetes Mother      Hypertension Mother      Mental Illness Other      Diabetes Other       Glaucoma No family hx of      Macular Degeneration No family hx of      Hypertension No family hx of      Lab Results   Component Value Date    A1C 7.2 04/24/2019    A1C 7.4 11/27/2018    A1C 6.4 01/19/2018    A1C 6.5 05/19/2017    A1C 6.2 04/13/2017     SUBJECTIVE FINDINGS:  A 63-year-old female returns to clinic for onychauxis bilaterally.  She is diabetic with peripheral neuropathy.  She relates she had an ankle sprain about 2-3 months ago.  She has seen an physician for it.  They got her an ankle sleeve.  She relates that is not really doing any good.  She has an appointment with them next week to get a cortisone shot.  She relates they took x-rays and there is no fracture.  She relates to numbness and tingling in her feet.  No ulcers or sores since I have seen her last.  She relates she needs her toenails cut.  She does have diabetic shoes.      OBJECTIVE FINDINGS:  DP and PT are 2/4 bilaterally.  She has some mild peripheral edema bilaterally.  There is no erythema, no odor, no calor bilaterally.  She has some lateral ankle edema on the left.  Negative anterior drawer sign.  She has pain on palpation of the lateral ankle and the anterior talofibular ligament course.  She has incurvated nails 1-5 bilaterally to differing degrees.  There is no erythema, no drainage, no odor, no calor bilaterally.      ASSESSMENT AND PLAN:  Onychauxis bilaterally.  She is diabetic with peripheral neuropathy.  Left ankle sprain.  Diagnosis and treatment options discussed with the patient.  All the nails were reduced bilaterally upon consent.  Ankle brace dispensed and use discussed with her.  She will follow up with her regular physician for her corticosteroid injection as directed and return to clinic and see me in about 3 months for diabetic foot care.         Again, thank you for allowing me to participate in the care of your patient.        Sincerely,        Javier Tuttle DPM

## 2019-07-29 NOTE — PROGRESS NOTES
"Nutrition Reassessment  Reason For Visit:  Elizabeth Palma is a 63 year-old female with type 2 DM (oral med management) presenting today for nutrition follow-up, s/p \"gastric bypass in 1990 at [facility]\" per Pt report.  She is seeing medical weight management.  She was referred by Dr. Irene.  Note: Pt had a kidney transplant on March 20, 2014.    Pt was accompanied by her .     Anthropometrics:  Height: 61\"  Pre-op Weight: 265 lbs per Pt report  (Pt reported that she initially was at 215 lbs in 2015)    Current Weight: 159.1 lbs (-2.2 lbs in the past 2 months)    Current Vitamins/Minerals: 3000 International Units vitamin D/day, Calcium 2x daily, vitamin C, vitamin B12 daily, B-complex  *Pt stated that she was told not to take other vitamins/minerals by MD.   3/27/19: Started Naltrexone at most recent MD appointment  4/30/19: patient reported that she is more hungry and is eating larger portions since medication change  5/28/19: taking Topiramate and Naltrexone     Nutrition History:  Lactose intolerance ever since bariatric surgery.  Pt stated that she has osteoporosis; however, she stated that her doctors don't want her to take any vitamins or minerals due to her kidney transplant.    Diet/Nutrition Intake Hx:   On previous RD visits, pt reported her intake is affected by nausea 2/2 her anti-rejection medications.  Pt is also limited in protein choices that she likes - pt reports she does not take much dairy, does not like beans and lentils, keeps kosher. Pt also stated on previous visits that she will not record food intake due to the fact that every time she does this, she simply does not eat as she doesn't want to record.  She stated that her therapist strongly advises against recording food intake for this reason.     Physical Activity Note: She reported doing some walking in hallways and in hallways at work, but no structured exercise. At a previous RD visit reported that she has a tendency to break " bones so she is limited with what exercises she does.     Recent food recall:  Breakfast: toast with butter or bagel with cream cheese  Lunch: impossible burger from [employer], vegetarian taco from [employer](no beans), baked potato  Dinner: steak, fish, eggs  Snacks: fruit, vegetable, pumpkin seeds  Beverages: water, tomato juice     Progress with previous goals:  1. Eat 3 meals with lean protein at each meal, along with a non-starchy vegetable or whole fruit, up to 1/2 c carb at a meal. Continues, eating three meals but not always including protein    2. If needing a snack, have vegetables or protein snack (give at least 2 hours between a meals and a snack). Using fruit and vegetables for snacks   3. Walk 10 min daily, increasing as able. (Starting physical therapy) walking while on trips and continues to go to physical therapy as able    4. Continue working on Meal planning for all meals. continues     Nutrition Prescription:   Grams Protein: 60 (minimum) - Not meeting consistently.   Amount of Fluid: 48-64 oz    Nutrition Diagnosis  Current: Overweight related to history of excessive energy intake, variable eating habits/intake and lack of sufficient exercise as evidenced by pt report and BMI > 25. continues    Intervention  Intervention At Appointment:  Materials/Education provided: Reviewed her progress towards previous goals.  Patient has been eating more regular meals but is not including protein with all meals.  She continues to go to physical therapy as able with her schedule and is walking as much as possible on trips.  Encouraged increasing protein intake at meals.  Gave encouragement and support.    Patient Understanding: good  Expected Compliance: good with continued RD follow up.     Goals:   1. Eat 3 meals with lean protein at each meal, along with a non-starchy vegetable or whole fruit, up to 1/2 c carb at a meal.  2. If needing a snack, have vegetables or protein snack (give at least 2 hours between  a meals and a snack).  3. Walk 10 min daily, increasing as able. (Starting physical therapy)  4. Continue working on Meal planning for all meals.    Follow-Up: 1 month or PRN     Time spent with patient: 15 minutes.  Leyla Guerrero RD, LD

## 2019-07-29 NOTE — PATIENT INSTRUCTIONS
Thanks for coming today.  [facility]  [address] [place] [address]    To schedule future appointments in Ortho Clinic, you may call [phone].    To schedule ordered imaging by your provider:   Call Central Imaging [phone]    To schedule an injection ordered by your provider:  Call Central Imaging Injection scheduling line: [phone]  [facility] 3Dhart available online at:  [email or web address]    Please call if any further questions or concerns ([phone]).  Clinic hours 8 am to 5 pm.    Return to clinic (call) if symptoms worsen or fail to improve.

## 2019-07-31 ENCOUNTER — TRANSFERRED RECORDS (OUTPATIENT)
Dept: HEALTH INFORMATION MANAGEMENT | Facility: CLINIC | Age: 64
End: 2019-07-31

## 2019-07-31 DIAGNOSIS — H40.9 MILD STAGE GLAUCOMA: ICD-10-CM

## 2019-08-01 RX ORDER — LATANOPROST 50 UG/ML
1 SOLUTION/ DROPS OPHTHALMIC AT BEDTIME
Qty: 7.5 ML | Refills: 2 | Status: SHIPPED | OUTPATIENT
Start: 2019-08-01 | End: 2020-08-13

## 2019-08-01 NOTE — TELEPHONE ENCOUNTER
Last Clinic Visit:[facility], Dr. Underwood  Last clinic note: Using latanoprost at bedtime both eyes with good compliance

## 2019-08-19 ENCOUNTER — INFUSION THERAPY VISIT (OUTPATIENT)
Dept: INFUSION THERAPY | Facility: CLINIC | Age: 64
End: 2019-08-19
Attending: INTERNAL MEDICINE
Payer: MEDICARE

## 2019-08-19 ENCOUNTER — APPOINTMENT (OUTPATIENT)
Dept: LAB | Facility: CLINIC | Age: 64
End: 2019-08-19
Attending: INTERNAL MEDICINE
Payer: MEDICARE

## 2019-08-19 VITALS
SYSTOLIC BLOOD PRESSURE: 132 MMHG | OXYGEN SATURATION: 99 % | BODY MASS INDEX: 30.91 KG/M2 | WEIGHT: 161 LBS | HEART RATE: 81 BPM | TEMPERATURE: 97.7 F | RESPIRATION RATE: 18 BRPM | DIASTOLIC BLOOD PRESSURE: 82 MMHG

## 2019-08-19 DIAGNOSIS — M81.0 SENILE OSTEOPOROSIS: ICD-10-CM

## 2019-08-19 DIAGNOSIS — Z94.0 DECEASED-DONOR KIDNEY TRANSPLANT: ICD-10-CM

## 2019-08-19 DIAGNOSIS — Z92.29 PERSONAL HISTORY OF OTHER DRUG THERAPY: ICD-10-CM

## 2019-08-19 DIAGNOSIS — M81.0 AGE-RELATED OSTEOPOROSIS WITHOUT CURRENT PATHOLOGICAL FRACTURE: Primary | ICD-10-CM

## 2019-08-19 LAB
CALCIUM SERPL-MCNC: 8.8 MG/DL (ref 8.5–10.1)
CREAT SERPL-MCNC: 0.91 MG/DL (ref 0.52–1.04)
GFR SERPL CREATININE-BSD FRML MDRD: 68 ML/MIN/{1.73_M2}
PHOSPHATE SERPL-MCNC: 3.2 MG/DL (ref 2.5–4.5)

## 2019-08-19 PROCEDURE — 96372 THER/PROPH/DIAG INJ SC/IM: CPT

## 2019-08-19 PROCEDURE — 82565 ASSAY OF CREATININE: CPT | Performed by: INTERNAL MEDICINE

## 2019-08-19 PROCEDURE — 82310 ASSAY OF CALCIUM: CPT | Performed by: INTERNAL MEDICINE

## 2019-08-19 PROCEDURE — 25000128 H RX IP 250 OP 636: Mod: ZF | Performed by: INTERNAL MEDICINE

## 2019-08-19 PROCEDURE — 84100 ASSAY OF PHOSPHORUS: CPT | Performed by: INTERNAL MEDICINE

## 2019-08-19 RX ADMIN — DENOSUMAB 60 MG: 60 INJECTION SUBCUTANEOUS at 12:56

## 2019-08-19 ASSESSMENT — PAIN SCALES - GENERAL
PAINLEVEL: NO PAIN (0)
PAINLEVEL: MILD PAIN (3)

## 2019-08-19 NOTE — NURSING NOTE
Chief Complaint   Patient presents with     Blood Draw     Labs drawn via  by RN in lab. VS taken.     Pia Valenzuela RN

## 2019-08-19 NOTE — PROGRESS NOTES
Patient presents to the [facility] for Prolia injection.  Order written by Dr. Byers was completed today. Name and  verified with patient. See MAR for medication details. Medication was divided into 1 syringes by pharmacy and given in the following sites abdominal tissues. Patient tolerated injection well and was discharged to home. Patient to return back in 6 months.    PHIL Sidhu LPN    Administrations This Visit     denosumab (PROLIA) injection 60 mg     Admin Date  2019 Action  Given Dose  60 mg Route  Subcutaneous Administered By  Beatris Sidhu LPN

## 2019-09-03 ENCOUNTER — HOSPITAL ENCOUNTER (OUTPATIENT)
Dept: PHYSICAL THERAPY | Facility: CLINIC | Age: 64
Setting detail: THERAPIES SERIES
End: 2019-09-03
Attending: PSYCHIATRY & NEUROLOGY
Payer: MEDICARE

## 2019-09-03 PROCEDURE — 97112 NEUROMUSCULAR REEDUCATION: CPT | Mod: GP | Performed by: PHYSICAL THERAPIST

## 2019-09-03 PROCEDURE — 97110 THERAPEUTIC EXERCISES: CPT | Mod: GP | Performed by: PHYSICAL THERAPIST

## 2019-09-03 PROCEDURE — 97750 PHYSICAL PERFORMANCE TEST: CPT | Mod: GP | Performed by: PHYSICAL THERAPIST

## 2019-09-18 NOTE — ANESTHESIA POSTPROCEDURE EVALUATION
Patient: Elizabeth Palma    Procedure(s):  Colonoscopy - Wound Class: II-Clean Contaminated    Diagnosis:Special screening for malignant neoplasms  Diagnosis Additional Information: No value filed.    Anesthesia Type:  MAC    Note:  Anesthesia Post Evaluation    Patient location during evaluation: PACU  Patient participation: Able to fully participate in evaluation  Level of consciousness: awake  Pain management: adequate  Airway patency: patent  Cardiovascular status: acceptable  Respiratory status: acceptable  Hydration status: acceptable  PONV: none     Anesthetic complications: None          Last vitals:  Vitals:    11/30/17 1310   BP: 116/67   Resp: 16   Temp: 36.8  C (98.3  F)   SpO2: 100%         Electronically Signed By: Darin Jay MD  November 30, 2017  3:31 PM   normal

## 2019-09-19 ENCOUNTER — OFFICE VISIT (OUTPATIENT)
Dept: PSYCHIATRY | Facility: CLINIC | Age: 64
End: 2019-09-19
Payer: MEDICARE

## 2019-09-19 VITALS
BODY MASS INDEX: 31.18 KG/M2 | RESPIRATION RATE: 19 BRPM | WEIGHT: 162.4 LBS | HEART RATE: 60 BPM | SYSTOLIC BLOOD PRESSURE: 142 MMHG | DIASTOLIC BLOOD PRESSURE: 76 MMHG

## 2019-09-19 DIAGNOSIS — F43.12 NIGHTMARES ASSOCIATED WITH CHRONIC POST-TRAUMATIC STRESS DISORDER: ICD-10-CM

## 2019-09-19 DIAGNOSIS — F33.1 MAJOR DEPRESSIVE DISORDER, RECURRENT EPISODE, MODERATE (H): ICD-10-CM

## 2019-09-19 DIAGNOSIS — F51.5 NIGHTMARES ASSOCIATED WITH CHRONIC POST-TRAUMATIC STRESS DISORDER: ICD-10-CM

## 2019-09-19 RX ORDER — PRAZOSIN HYDROCHLORIDE 5 MG/1
CAPSULE ORAL
Qty: 90 CAPSULE | Refills: 3 | Status: SHIPPED | OUTPATIENT
Start: 2019-09-19 | End: 2019-11-21

## 2019-09-19 RX ORDER — VILAZODONE HYDROCHLORIDE 40 MG/1
40 TABLET ORAL DAILY
Qty: 30 TABLET | Refills: 3 | Status: SHIPPED | OUTPATIENT
Start: 2019-09-19 | End: 2019-11-21

## 2019-09-19 RX ORDER — ARIPIPRAZOLE 2 MG/1
2 TABLET ORAL DAILY
Qty: 30 TABLET | Refills: 3 | Status: SHIPPED | OUTPATIENT
Start: 2019-09-19 | End: 2019-11-21

## 2019-09-19 RX ORDER — PRAZOSIN HYDROCHLORIDE 2 MG/1
CAPSULE ORAL
Qty: 30 CAPSULE | Refills: 3 | Status: SHIPPED | OUTPATIENT
Start: 2019-09-19 | End: 2019-11-21

## 2019-09-19 ASSESSMENT — ANXIETY QUESTIONNAIRES
5. BEING SO RESTLESS THAT IT IS HARD TO SIT STILL: MORE THAN HALF THE DAYS
4. TROUBLE RELAXING: SEVERAL DAYS
6. BECOMING EASILY ANNOYED OR IRRITABLE: MORE THAN HALF THE DAYS
3. WORRYING TOO MUCH ABOUT DIFFERENT THINGS: SEVERAL DAYS
2. NOT BEING ABLE TO STOP OR CONTROL WORRYING: SEVERAL DAYS
GAD7 TOTAL SCORE: 8
1. FEELING NERVOUS, ANXIOUS, OR ON EDGE: SEVERAL DAYS
7. FEELING AFRAID AS IF SOMETHING AWFUL MIGHT HAPPEN: NOT AT ALL

## 2019-09-19 ASSESSMENT — PATIENT HEALTH QUESTIONNAIRE - PHQ9: SUM OF ALL RESPONSES TO PHQ QUESTIONS 1-9: 3
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
## 2019-09-20 ASSESSMENT — ANXIETY QUESTIONNAIRES: GAD7 TOTAL SCORE: 8

## 2019-09-23 ENCOUNTER — ALLIED HEALTH/NURSE VISIT (OUTPATIENT)
Dept: SURGERY | Facility: CLINIC | Age: 64
End: 2019-09-23
Payer: MEDICARE

## 2019-09-23 ENCOUNTER — HOSPITAL ENCOUNTER (OUTPATIENT)
Dept: PHYSICAL THERAPY | Facility: CLINIC | Age: 64
Setting detail: THERAPIES SERIES
End: 2019-09-23
Attending: PSYCHIATRY & NEUROLOGY
Payer: MEDICARE

## 2019-09-23 VITALS — WEIGHT: 161.4 LBS | BODY MASS INDEX: 30.99 KG/M2

## 2019-09-23 PROCEDURE — 97750 PHYSICAL PERFORMANCE TEST: CPT | Mod: GP | Performed by: PHYSICAL THERAPIST

## 2019-09-23 PROCEDURE — 97112 NEUROMUSCULAR REEDUCATION: CPT | Mod: GP | Performed by: PHYSICAL THERAPIST

## 2019-09-23 NOTE — PATIENT INSTRUCTIONS
Goals:   1. Eat 3 meals with lean protein at each meal, along with a non-starchy vegetable or whole fruit, up to 1/2 c carb at a meal.  2. If needing a snack, have vegetables or protein snack (give at least 2 hours between a meals and a snack).  3. Walk 10 min daily, increasing as able. (Starting physical therapy)  4. Continue working on meal planning for all meals.    Follow up with RD in one month    Leyla Guerrero RD, LD  If you need to schedule or reschedule with a dietitian please call [phone].

## 2019-09-23 NOTE — PROGRESS NOTES
"Nutrition Reassessment  Reason For Visit:  Elizabeth Palma is a 64 year-old female with type 2 DM (oral med management) presenting today for nutrition follow-up, s/p \"gastric bypass in 1990 at [facility]\" per Pt report.  She is seeing medical weight management.  She was referred by Dr. Irene.  Note: Pt had a kidney transplant on March 20, 2014.    Pt was accompanied by her .     Anthropometrics:  Height: 61\"  Pre-op Weight: 265 lbs per Pt report  (Pt reported that she initially was at 215 lbs in 2015)    Current Weight: 161.4 lbs (2.3 lbs in the past 2 months)    Current Vitamins/Minerals: 3000 International Units vitamin D/day, Calcium 2x daily, vitamin C, vitamin B12 daily, B-complex  *Pt stated that she was told not to take other vitamins/minerals by MD.   3/27/19: Started Naltrexone at most recent MD appointment  4/30/19: patient reported that she is more hungry and is eating larger portions since medication change  5/28/19: taking Topiramate and Naltrexone     Nutrition History:  Lactose intolerance ever since bariatric surgery.  Pt stated that she has osteoporosis; however, she stated that her doctors don't want her to take any vitamins or minerals due to her kidney transplant.    Diet/Nutrition Intake Hx:   On previous RD visits, pt reported her intake is affected by nausea 2/2 her anti-rejection medications.  Pt is also limited in protein choices that she likes - pt reports she does not take much dairy, does not like beans and lentils, keeps kosher. Pt also stated on previous visits that she will not record food intake due to the fact that every time she does this, she simply does not eat as she doesn't want to record.  She stated that her therapist strongly advises against recording food intake for this reason.     Physical Activity Note: She reported doing some walking in hallways and in hallways at work, but no structured exercise. At a previous RD visit reported that she has a tendency to break " bones so she is limited with what exercises she does.     Recent food recall:  Breakfast: 1/2 bagel with butter  Lunch: impossible burger 1/4 with 1-2 french fries  Dinner: protein most of the time ie chicken wing  Snacks: crackers(rare), frozen italian ice 1/2, 1/4 cup sunflower seeds  Beverages: water     Progress with previous goals:  1. Eat 3 meals with lean protein at each meal, along with a non-starchy vegetable or whole fruit, up to 1/2 c carb at a meal. Improving, eating three meals most days   2. If needing a snack, have vegetables or protein snack (give at least 2 hours between a meals and a snack). Snacking more in the evenings   3. Walk 10 min daily, increasing as able. (Starting physical therapy) walking a lot with grandson [place] the zoo and went to [place] and walked three days.  Started physical therapy.   4. Continue working on Meal planning for all meals. Planning for Jewish holidays     Nutrition Prescription:   Grams Protein: 60 (minimum) - Not meeting consistently.   Amount of Fluid: 48-64 oz    Nutrition Diagnosis  Current: Overweight related to history of excessive energy intake, variable eating habits/intake and lack of sufficient exercise as evidenced by pt report and BMI > 25. continues    Intervention  Intervention At Appointment:  Materials/Education provided: Reviewed her progress towards previous goals.  Patient continues to try and eat more regular meals and is including protein more consistently at meals.  She continues to go to physical therapy.  Patient reported walking multiple miles each day on recent vacation.  Encouraged increasing protein intake at meals.  Possible fluid retention from recent travel and eating out.  Gave encouragement and support.    Patient Understanding: good  Expected Compliance: good with continued RD follow up.     Goals:   1. Eat 3 meals with lean protein at each meal, along with a non-starchy vegetable or whole fruit, up to 1/2 c carb at a meal.  2. If  needing a snack, have vegetables or protein snack (give at least 2 hours between a meals and a snack).  3. Walk 10 min daily, increasing as able. (Starting physical therapy)  4. Continue working on Meal planning for all meals.    Follow-Up: 1 month or PRN     Time spent with patient: 15 minutes.  Leyla Guerrero RD, LD

## 2019-09-27 DIAGNOSIS — E66.01 MORBID OBESITY DUE TO EXCESS CALORIES (H): ICD-10-CM

## 2019-09-29 ENCOUNTER — HEALTH MAINTENANCE LETTER (OUTPATIENT)
Age: 64
End: 2019-09-29

## 2019-10-01 RX ORDER — TOPIRAMATE 200 MG/1
TABLET, FILM COATED ORAL
Qty: 60 TABLET | Refills: 1 | OUTPATIENT
Start: 2019-10-01

## 2019-10-02 DIAGNOSIS — E66.01 MORBID OBESITY DUE TO EXCESS CALORIES (H): ICD-10-CM

## 2019-10-06 RX ORDER — TOPIRAMATE 200 MG/1
TABLET, FILM COATED ORAL
Qty: 60 TABLET | Refills: 1 | OUTPATIENT
Start: 2019-10-06

## 2019-10-07 ENCOUNTER — OFFICE VISIT (OUTPATIENT)
Dept: ENDOCRINOLOGY | Facility: CLINIC | Age: 64
End: 2019-10-07
Payer: MEDICARE

## 2019-10-07 ENCOUNTER — HOSPITAL ENCOUNTER (OUTPATIENT)
Dept: PHYSICAL THERAPY | Facility: CLINIC | Age: 64
Setting detail: THERAPIES SERIES
End: 2019-10-07
Attending: PSYCHIATRY & NEUROLOGY
Payer: MEDICARE

## 2019-10-07 VITALS
TEMPERATURE: 98.6 F | DIASTOLIC BLOOD PRESSURE: 72 MMHG | WEIGHT: 161.9 LBS | SYSTOLIC BLOOD PRESSURE: 128 MMHG | HEIGHT: 61 IN | HEART RATE: 85 BPM | BODY MASS INDEX: 30.57 KG/M2 | OXYGEN SATURATION: 97 %

## 2019-10-07 DIAGNOSIS — E66.09 CLASS 1 OBESITY DUE TO EXCESS CALORIES WITHOUT SERIOUS COMORBIDITY IN ADULT, UNSPECIFIED BMI: Primary | ICD-10-CM

## 2019-10-07 DIAGNOSIS — E66.811 CLASS 1 OBESITY DUE TO EXCESS CALORIES WITHOUT SERIOUS COMORBIDITY IN ADULT, UNSPECIFIED BMI: Primary | ICD-10-CM

## 2019-10-07 PROCEDURE — 97110 THERAPEUTIC EXERCISES: CPT | Mod: GP | Performed by: PHYSICAL THERAPIST

## 2019-10-07 PROCEDURE — 97112 NEUROMUSCULAR REEDUCATION: CPT | Mod: GP | Performed by: PHYSICAL THERAPIST

## 2019-10-07 RX ORDER — PHENTERMINE HYDROCHLORIDE 15 MG/1
15 CAPSULE ORAL EVERY MORNING
Qty: 30 CAPSULE | Refills: 5 | Status: SHIPPED | OUTPATIENT
Start: 2019-10-07 | End: 2019-12-17

## 2019-10-07 ASSESSMENT — MIFFLIN-ST. JEOR: SCORE: 1231.75

## 2019-10-07 ASSESSMENT — PAIN SCALES - GENERAL: PAINLEVEL: NO PAIN (0)

## 2019-10-07 NOTE — LETTER
"10/7/2019       RE: Elizabeth Palma  [address]  [facility] [address]     Dear Colleague,    Thank you for referring your patient, Elizabeth Palma, to the [facility] at [facility]. Please see a copy of my visit note below.        Return Medical Weight Management Note     Elizabeth Palma  MRN:  [id number]  :  1955  AYSE:  19    Dear Dr. Sheridan,      I had the pleasure of seeing your patient Elizabeth Palma.  She is a 63 year old female who I am continuing to see for treatment of obesity related to: DM-2, Hyperlipidemia, Sleep Apnea (has CPAP and does not use), GERD and Depression. S/p gastric bypass surgery in 1990.     CURRENT WEIGHT:   161 lbs 14.4 oz    Wt Readings from Last 4 Encounters:   10/07/19 73.4 kg (161 lb 14.4 oz)   19 73.2 kg (161 lb 6.4 oz)   19 73.7 kg (162 lb 6.4 oz)   19 73 kg (161 lb)     Body Mass Index:  Body mass index is 30.59 kg/m .  Vitals:  /72 (BP Location: Left arm, Patient Position: Sitting, Cuff Size: Adult Regular)   Pulse 85   Temp 98.6  F (37  C) (Oral)   Ht 1.549 m (5' 1\")   Wt 73.4 kg (161 lb 14.4 oz)   LMP  (LMP Unknown)   SpO2 97%   BMI 30.59 kg/m       Initial consult weight was 214 on 2015.  Weight change since last seen on 19 is down 1 lbs.   Total loss is 53 pounds.    INTERVAL HISTORY:  Back on topiramate 200 AM and 200 HS with no weight loss, stiopped naltrexone as not helping. Remains off gabapentin, and pain is ok. Reports she has been struggling with frequent cravings for salty snacks like crackers, chips, and pretzels throughout the day, but primarily at night.  Denies any side effects. She feels full quickly ever since gastric bypass surgery in .      Diet and Activity Changes Since Last Visit Reviewed With Patient 10/7/2019   I have made the following changes to my diet since my last visit: smaller portions   With " regards to my diet, I am still struggling with: protien   For breakfast, I typically eat: -   For lunch, I typically eat: -   For supper, I typically eat: -   For snack(s), I typically eat: -   I have made the following changes to my activity/exercise since my last visit: I am more active   With regards to my activity/exercise, I am still struggling with: regualar activity     MEDICATIONS:   Current Outpatient Medications   Medication     acetaminophen (TYLENOL) 325 MG tablet     acetylcysteine (N-ACETYL-L-CYSTEINE) 600 MG CAPS capsule     albuterol (PROAIR HFA/PROVENTIL HFA/VENTOLIN HFA) 108 (90 Base) MCG/ACT inhaler     ARIPiprazole (ABILIFY) 2 MG tablet     aspirin EC 81 MG EC tablet     blood glucose monitoring (ACCU-CHEK RALPH PLUS) meter device kit     blood glucose monitoring (NO BRAND SPECIFIED) meter device kit     blood glucose monitoring (NO BRAND SPECIFIED) test strip     blood glucose monitoring (SOFTCLIX) lancets     Cholecalciferol (VITAMIN D) 1000 UNITS capsule     cyanocolbalamin (VITAMIN  B-12) 1000 MCG tablet     cycloSPORINE modified (GENERIC EQUIVALENT) 25 MG capsule     cycloSPORINE modified (GENERIC EQUIVALENT) 25 MG capsule     econazole nitrate 1 % cream     estradiol (VAGIFEM) 10 MCG TABS vaginal tablet     fluticasone (FLONASE) 50 MCG/ACT nasal spray     furosemide (LASIX) 20 MG tablet     Furosemide (LASIX) 20 MG tablet     latanoprost (XALATAN) 0.005 % ophthalmic solution     metFORMIN (GLUCOPHAGE-XR) 500 MG 24 hr tablet     mycophenolate (GENERIC EQUIVALENT) 250 MG capsule     omeprazole (PRILOSEC) 40 MG DR capsule     ondansetron (ZOFRAN-ODT) 4 MG ODT tab     order for DME     order for DME     Polyvinyl Alcohol-Povidone (REFRESH OP)     prazosin (MINIPRESS) 2 MG capsule     prazosin (MINIPRESS) 5 MG capsule     simvastatin (ZOCOR) 20 MG tablet     sulfamethoxazole-trimethoprim (BACTRIM/SEPTRA) 400-80 MG per tablet     topiramate (TOPAMAX) 200 MG tablet     Vaginal Lubricant (REPLENS)  GEL     vilazodone (VIIBRYD) 40 MG TABS tablet     No current facility-administered medications for this visit.      Weight Loss Medication History Reviewed With Patient 10/7/2019   Which weight loss medications are you currently taking on a regular basis?  Topamax (topiramate)   Are you having any side effects from the weight loss medication that we have prescribed you? No   If you are having side effects please describe: -     ASSESSMENT:   Trial of phentermine 15 AM along with topiramate 200 BID. CV status acceptable for this. Topiramate level was very generous on her intended dose.Advised she remove snacks from the home. Remains off gabapentin with acceptable neuropathy pain control.     FOLLOW-UP:    3-4 months  10/15 minutes spent on counseling and education      Again, thank you for allowing me to participate in the care of your patient.      Sincerely,    Prakash Irene MD

## 2019-10-07 NOTE — PROGRESS NOTES
"    Return Medical Weight Management Note     Elizabeth Palma  MRN:  [id number]  :  1955  AYSE:  19    Dear Dr. Sheridan,      I had the pleasure of seeing your patient Elizabeth Palma.  She is a 63 year old female who I am continuing to see for treatment of obesity related to: DM-2, Hyperlipidemia, Sleep Apnea (has CPAP and does not use), GERD and Depression. S/p gastric bypass surgery in 1990.     CURRENT WEIGHT:   161 lbs 14.4 oz    Wt Readings from Last 4 Encounters:   10/07/19 73.4 kg (161 lb 14.4 oz)   19 73.2 kg (161 lb 6.4 oz)   19 73.7 kg (162 lb 6.4 oz)   19 73 kg (161 lb)     Body Mass Index:  Body mass index is 30.59 kg/m .  Vitals:  /72 (BP Location: Left arm, Patient Position: Sitting, Cuff Size: Adult Regular)   Pulse 85   Temp 98.6  F (37  C) (Oral)   Ht 1.549 m (5' 1\")   Wt 73.4 kg (161 lb 14.4 oz)   LMP  (LMP Unknown)   SpO2 97%   BMI 30.59 kg/m      Initial consult weight was 214 on 2015.  Weight change since last seen on 19 is down 1 lbs.   Total loss is 53 pounds.    INTERVAL HISTORY:  Back on topiramate 200 AM and 200 HS with no weight loss, stiopped naltrexone as not helping. Remains off gabapentin, and pain is ok. Reports she has been struggling with frequent cravings for salty snacks like crackers, chips, and pretzels throughout the day, but primarily at night.  Denies any side effects. She feels full quickly ever since gastric bypass surgery in .      Diet and Activity Changes Since Last Visit Reviewed With Patient 10/7/2019   I have made the following changes to my diet since my last visit: smaller portions   With regards to my diet, I am still struggling with: protien   For breakfast, I typically eat: -   For lunch, I typically eat: -   For supper, I typically eat: -   For snack(s), I typically eat: -   I have made the following changes to my activity/exercise since my last visit: I am more active   With regards to my " activity/exercise, I am still struggling with: regualar activity     MEDICATIONS:   Current Outpatient Medications   Medication     acetaminophen (TYLENOL) 325 MG tablet     acetylcysteine (N-ACETYL-L-CYSTEINE) 600 MG CAPS capsule     albuterol (PROAIR HFA/PROVENTIL HFA/VENTOLIN HFA) 108 (90 Base) MCG/ACT inhaler     ARIPiprazole (ABILIFY) 2 MG tablet     aspirin EC 81 MG EC tablet     blood glucose monitoring (ACCU-CHEK RALPH PLUS) meter device kit     blood glucose monitoring (NO BRAND SPECIFIED) meter device kit     blood glucose monitoring (NO BRAND SPECIFIED) test strip     blood glucose monitoring (SOFTCLIX) lancets     Cholecalciferol (VITAMIN D) 1000 UNITS capsule     cyanocolbalamin (VITAMIN  B-12) 1000 MCG tablet     cycloSPORINE modified (GENERIC EQUIVALENT) 25 MG capsule     cycloSPORINE modified (GENERIC EQUIVALENT) 25 MG capsule     econazole nitrate 1 % cream     estradiol (VAGIFEM) 10 MCG TABS vaginal tablet     fluticasone (FLONASE) 50 MCG/ACT nasal spray     furosemide (LASIX) 20 MG tablet     Furosemide (LASIX) 20 MG tablet     latanoprost (XALATAN) 0.005 % ophthalmic solution     metFORMIN (GLUCOPHAGE-XR) 500 MG 24 hr tablet     mycophenolate (GENERIC EQUIVALENT) 250 MG capsule     omeprazole (PRILOSEC) 40 MG DR capsule     ondansetron (ZOFRAN-ODT) 4 MG ODT tab     order for DME     order for DME     Polyvinyl Alcohol-Povidone (REFRESH OP)     prazosin (MINIPRESS) 2 MG capsule     prazosin (MINIPRESS) 5 MG capsule     simvastatin (ZOCOR) 20 MG tablet     sulfamethoxazole-trimethoprim (BACTRIM/SEPTRA) 400-80 MG per tablet     topiramate (TOPAMAX) 200 MG tablet     Vaginal Lubricant (REPLENS) GEL     vilazodone (VIIBRYD) 40 MG TABS tablet     No current facility-administered medications for this visit.      Weight Loss Medication History Reviewed With Patient 10/7/2019   Which weight loss medications are you currently taking on a regular basis?  Topamax (topiramate)   Are you having any side  effects from the weight loss medication that we have prescribed you? No   If you are having side effects please describe: -     ASSESSMENT:   Trial of phentermine 15 AM along with topiramate 200 BID. CV status acceptable for this. Topiramate level was very generous on her intended dose.Advised she remove snacks from the home. Remains off gabapentin with acceptable neuropathy pain control.     FOLLOW-UP:    3-4 months  10/15 minutes spent on counseling and education

## 2019-10-07 NOTE — LETTER
"10/7/2019      RE: Elizabeth Palma  [address]  [facility] [address]           Return Medical Weight Management Note     Elizabeth Palma  MRN:  [id number]  :  1955  AYSE:  19    Dear Dr. Sheridan,      I had the pleasure of seeing your patient Elizabeth Palma.  She is a 63 year old female who I am continuing to see for treatment of obesity related to: DM-2, Hyperlipidemia, Sleep Apnea (has CPAP and does not use), GERD and Depression. S/p gastric bypass surgery in 1990.     CURRENT WEIGHT:   161 lbs 14.4 oz    Wt Readings from Last 4 Encounters:   10/07/19 73.4 kg (161 lb 14.4 oz)   19 73.2 kg (161 lb 6.4 oz)   19 73.7 kg (162 lb 6.4 oz)   19 73 kg (161 lb)     Body Mass Index:  Body mass index is 30.59 kg/m .  Vitals:  /72 (BP Location: Left arm, Patient Position: Sitting, Cuff Size: Adult Regular)   Pulse 85   Temp 98.6  F (37  C) (Oral)   Ht 1.549 m (5' 1\")   Wt 73.4 kg (161 lb 14.4 oz)   LMP  (LMP Unknown)   SpO2 97%   BMI 30.59 kg/m       Initial consult weight was 214 on 2015.  Weight change since last seen on 19 is down 1 lbs.   Total loss is 53 pounds.    INTERVAL HISTORY:  Back on topiramate 200 AM and 200 HS with no weight loss, stiopped naltrexone as not helping. Remains off gabapentin, and pain is ok. Reports she has been struggling with frequent cravings for salty snacks like crackers, chips, and pretzels throughout the day, but primarily at night.  Denies any side effects. She feels full quickly ever since gastric bypass surgery in .      Diet and Activity Changes Since Last Visit Reviewed With Patient 10/7/2019   I have made the following changes to my diet since my last visit: smaller portions   With regards to my diet, I am still struggling with: protien   For breakfast, I typically eat: -   For lunch, I typically eat: -   For supper, I typically eat: -   For snack(s), I typically eat: -   I have made the " following changes to my activity/exercise since my last visit: I am more active   With regards to my activity/exercise, I am still struggling with: regualar activity     MEDICATIONS:   Current Outpatient Medications   Medication     acetaminophen (TYLENOL) 325 MG tablet     acetylcysteine (N-ACETYL-L-CYSTEINE) 600 MG CAPS capsule     albuterol (PROAIR HFA/PROVENTIL HFA/VENTOLIN HFA) 108 (90 Base) MCG/ACT inhaler     ARIPiprazole (ABILIFY) 2 MG tablet     aspirin EC 81 MG EC tablet     blood glucose monitoring (ACCU-CHEK RALPH PLUS) meter device kit     blood glucose monitoring (NO BRAND SPECIFIED) meter device kit     blood glucose monitoring (NO BRAND SPECIFIED) test strip     blood glucose monitoring (SOFTCLIX) lancets     Cholecalciferol (VITAMIN D) 1000 UNITS capsule     cyanocolbalamin (VITAMIN  B-12) 1000 MCG tablet     cycloSPORINE modified (GENERIC EQUIVALENT) 25 MG capsule     cycloSPORINE modified (GENERIC EQUIVALENT) 25 MG capsule     econazole nitrate 1 % cream     estradiol (VAGIFEM) 10 MCG TABS vaginal tablet     fluticasone (FLONASE) 50 MCG/ACT nasal spray     furosemide (LASIX) 20 MG tablet     Furosemide (LASIX) 20 MG tablet     latanoprost (XALATAN) 0.005 % ophthalmic solution     metFORMIN (GLUCOPHAGE-XR) 500 MG 24 hr tablet     mycophenolate (GENERIC EQUIVALENT) 250 MG capsule     omeprazole (PRILOSEC) 40 MG DR capsule     ondansetron (ZOFRAN-ODT) 4 MG ODT tab     order for DME     order for DME     Polyvinyl Alcohol-Povidone (REFRESH OP)     prazosin (MINIPRESS) 2 MG capsule     prazosin (MINIPRESS) 5 MG capsule     simvastatin (ZOCOR) 20 MG tablet     sulfamethoxazole-trimethoprim (BACTRIM/SEPTRA) 400-80 MG per tablet     topiramate (TOPAMAX) 200 MG tablet     Vaginal Lubricant (REPLENS) GEL     vilazodone (VIIBRYD) 40 MG TABS tablet     No current facility-administered medications for this visit.      Weight Loss Medication History Reviewed With Patient 10/7/2019   Which weight loss  medications are you currently taking on a regular basis?  Topamax (topiramate)   Are you having any side effects from the weight loss medication that we have prescribed you? No   If you are having side effects please describe: -     ASSESSMENT:   Trial of phentermine 15 AM along with topiramate 200 BID. CV status acceptable for this. Topiramate level was very generous on her intended dose.Advised she remove snacks from the home. Remains off gabapentin with acceptable neuropathy pain control.     FOLLOW-UP:    3-4 months  10/15 minutes spent on counseling and education      Prakash Irene MD

## 2019-10-07 NOTE — NURSING NOTE
"Chief Complaint   Patient presents with     RECHECK     3 month follow up.       Vitals:    10/07/19 1002   BP: 128/72   BP Location: Left arm   Patient Position: Sitting   Cuff Size: Adult Regular   Pulse: 85   Temp: 98.6  F (37  C)   TempSrc: Oral   SpO2: 97%   Weight: 73.4 kg (161 lb 14.4 oz)   Height: 1.549 m (5' 1\")       Body mass index is 30.59 kg/m .                  JENNIFER JEWELL, EMT    "

## 2019-10-09 ENCOUNTER — TELEPHONE (OUTPATIENT)
Dept: ENDOCRINOLOGY | Facility: CLINIC | Age: 64
End: 2019-10-09

## 2019-10-09 DIAGNOSIS — E66.01 MORBID OBESITY DUE TO EXCESS CALORIES (H): ICD-10-CM

## 2019-10-09 RX ORDER — TOPIRAMATE 200 MG/1
200 TABLET, FILM COATED ORAL 2 TIMES DAILY
Qty: 60 TABLET | Refills: 5 | Status: SHIPPED | OUTPATIENT
Start: 2019-10-09 | End: 2020-04-10

## 2019-10-09 NOTE — TELEPHONE ENCOUNTER
Patient seen in clinic with Dr. Irene on 10/7/19. Sending new script for Topiramate 200mg BID to pharmacy.

## 2019-10-09 NOTE — TELEPHONE ENCOUNTER
Reason for call:  Medication   If this is a refill request, has the caller requested the refill from the pharmacy already? Yes  Will the patient be using a [facility]? No  Name of the pharmacy and phone number for the current request: [facility]    Name of the medication requested: Topiramate    Other request: The pharmacy still has not recieved the new script. Please send over so the patient can get this filled.    Phone number to reach patient:  Cell number on file:    Telephone Information:   Mobile [phone]       Best Time:  Anytime     Can we leave a detailed message on this number?  YES

## 2019-10-14 RX ORDER — TOPIRAMATE 200 MG/1
TABLET, FILM COATED ORAL
Qty: 60 TABLET | Refills: 1 | OUTPATIENT
Start: 2019-10-14

## 2019-10-21 ENCOUNTER — HOSPITAL ENCOUNTER (OUTPATIENT)
Dept: PHYSICAL THERAPY | Facility: CLINIC | Age: 64
Setting detail: THERAPIES SERIES
End: 2019-10-21
Attending: PSYCHIATRY & NEUROLOGY
Payer: MEDICARE

## 2019-10-21 PROCEDURE — 97116 GAIT TRAINING THERAPY: CPT | Mod: GP | Performed by: PHYSICAL THERAPIST

## 2019-10-21 PROCEDURE — 97750 PHYSICAL PERFORMANCE TEST: CPT | Mod: GP | Performed by: PHYSICAL THERAPIST

## 2019-10-22 DIAGNOSIS — Z94.0 KIDNEY TRANSPLANTED: ICD-10-CM

## 2019-10-22 DIAGNOSIS — D84.9 IMMUNOSUPPRESSION (H): ICD-10-CM

## 2019-10-22 RX ORDER — SULFAMETHOXAZOLE AND TRIMETHOPRIM 400; 80 MG/1; MG/1
1 TABLET ORAL DAILY
Qty: 90 TABLET | Refills: 3 | Status: SHIPPED | OUTPATIENT
Start: 2019-10-22 | End: 2019-10-23

## 2019-10-22 NOTE — TELEPHONE ENCOUNTER
[facility] Call Center    Phone Message    May a detailed message be left on voicemail: yes    Reason for Call: Medication Refill Request    Has the patient contacted the pharmacy for the refill? Yes   Name of medication being requested: Sulfamethoxazole-trimethoprim  Provider who prescribed the medication:Christian Jimenez   Pharmacy: [facility]  Date medication is needed: ASAP     Action Taken: Message routed to:  [facility] ([facility] Transplant

## 2019-10-23 DIAGNOSIS — D84.9 IMMUNOSUPPRESSION (H): ICD-10-CM

## 2019-10-23 DIAGNOSIS — Z94.0 KIDNEY TRANSPLANTED: Primary | ICD-10-CM

## 2019-10-23 RX ORDER — SULFAMETHOXAZOLE AND TRIMETHOPRIM 400; 80 MG/1; MG/1
1 TABLET ORAL DAILY
Qty: 90 TABLET | Refills: 3 | Status: SHIPPED | OUTPATIENT
Start: 2019-10-23 | End: 2020-10-12

## 2019-10-25 NOTE — PROGRESS NOTES
"Nutrition Reassessment  Reason For Visit:  Elizabeth Palma is a 64 year-old female presenting today for nutrition follow-up, s/p \"gastric bypass in 1990 at [facility]\" per Pt report. She is seeing medical weight management.  She was referred by Dr. Irene.  Note: Pt had a kidney transplant on March 20, 2014.    Pt was accompanied by her .     Pt signed Medicare ABN form which is good for 1 year (10/28/19-10/28/20).    Patient with Co-morbidities of obesity including:  Type II DM yes  Renal Failure no  Sleep apnea yes  Hypertension no   Dyslipidemia yes  Joint pain no  Back pain no  GERD yes     Anthropometrics:  Height: 61\"  Pre-op Weight: 265 lbs per Pt report  (Pt reported that she initially was at 215 lbs in 2015)    Current Weight: 162.2 lbs (+0.8 lbs in the past 1 month)    Current Vitamins/Minerals: 3000 International Units vitamin D/day, Calcium 2x daily, vitamin C, vitamin B12 daily, B-complex  *Pt stated that she was told not to take other vitamins/minerals by MD.   3/27/19: Started Naltrexone at most recent MD appointment  4/30/19: patient reported that she is more hungry and is eating larger portions since medication change  5/28/19: taking Topiramate and Naltrexone     Nutrition History:  Lactose intolerance ever since bariatric surgery.  Pt stated that she has osteoporosis; however, she stated that her doctors don't want her to take any vitamins or minerals due to her kidney transplant.    Diet/Nutrition Intake Hx:   On previous RD visits, pt reported her intake is affected by nausea 2/2 her anti-rejection medications.  Pt is also limited in protein choices that she likes - pt reports she does not take much dairy, does not like beans and lentils, keeps kosher. Pt also stated on previous visits that she will not record food intake due to the fact that every time she does this, she simply does not eat as she doesn't want to record.  She stated that her therapist strongly advises against recording " "food intake for this reason. Pt reports that she does not like meat, and dos not eat a lot of it.     Physical Activity Note: She reported doing some walking in hallways and in hallways at work, but no structured exercise. At a previous RD visit reported that she has a tendency to break bones so she is limited with what exercises she does.     Recent Diet Recall:  Breakfast: 1/2 bagel, or toast, or grits - eating >80% of portion  Lunch: Impossible whooper (eating 1/4 of burger); baked potato, or cheese  Dinner: Cheese curds; hamburger meat and veggies   Snacks: Nuts, crackers, popcorn (snacking \"all day long\")   Beverages: Water  Dining Out: Burger lolita 3x/wk,     Progress Towards Previous Goals:  1. Eat 3 meals with lean protein at each meal, along with a non-starchy vegetable or whole fruit, up to 1/2 c carb at a meal. - Improving, incorporating more vegetables, but continues to find difficulty meeting adequate protein intake.   2. If needing a snack, have vegetables or protein snack (give at least 2 hours between a meals and a snack). - Not met, snacks frequently all day long.   3. Walk 10 min daily, increasing as able. (Starting physical therapy) - Met, increased walking every other week walking 2 miles.  Daily walking 10 mins. Almost done with PT.    4. Continue working on Meal planning for all meals. - Improved, going to grocery store more often to prepare meals at home, rather than eating out.      Nutrition Prescription:   Grams Protein: 60 (minimum) - Not meeting consistently.   Amount of Fluid: 48-64 oz    Nutrition Diagnosis  Current: Overweight related to history of excessive energy intake, variable eating habits/intake and lack of sufficient exercise as evidenced by pt report and BMI > 25. continues    Intervention  Intervention At Appointment:  Materials/Education provided: Reviewed her progress towards previous goals. Discussed vegetarian sources of protein (provided pt with handout - List of " vegetarian protein sources). Encouraged pt to continue to focus on adequate protein intake at meals. Encouraged pt to limit snacking to <3x/day, and provided examples of high-protein snack options. Encouraged pt to continue to increase exercise as able, and to continue to plan meals ahead of time to prepare at home.       Patient Understanding: good  Expected Compliance: good with continued RD follow up.     Goals:   1. Eat 3 meals with lean protein at each meal, along with a non-starchy vegetable or whole fruit, up to 1/2 c carb at a meal.  2. If needing a snack, have vegetables or protein snack (Hippeas). Limit 3x/day.    3. Walk 15 min daily, increasing as able. (Starting physical therapy)  4. Continue working on Meal planning for all meals.     Follow-Up: 1 month or PRN     Time spent with patient: 15 minutes.  Mireille Aguirre, RD, LD

## 2019-10-28 ENCOUNTER — ALLIED HEALTH/NURSE VISIT (OUTPATIENT)
Dept: SURGERY | Facility: CLINIC | Age: 64
End: 2019-10-28
Payer: MEDICARE

## 2019-10-28 VITALS — WEIGHT: 162.2 LBS | BODY MASS INDEX: 30.65 KG/M2

## 2019-10-28 DIAGNOSIS — Z48.298 AFTERCARE FOLLOWING ORGAN TRANSPLANT: ICD-10-CM

## 2019-10-28 DIAGNOSIS — Z94.0 KIDNEY TRANSPLANTED: ICD-10-CM

## 2019-10-28 LAB
ANION GAP SERPL CALCULATED.3IONS-SCNC: 6 MMOL/L (ref 3–14)
BUN SERPL-MCNC: 12 MG/DL (ref 7–30)
CALCIUM SERPL-MCNC: 9.6 MG/DL (ref 8.5–10.1)
CHLORIDE SERPL-SCNC: 109 MMOL/L (ref 94–109)
CO2 SERPL-SCNC: 23 MMOL/L (ref 20–32)
CREAT SERPL-MCNC: 0.98 MG/DL (ref 0.52–1.04)
CYCLOSPORINE BLD LC/MS/MS-MCNC: 95 UG/L (ref 50–400)
ERYTHROCYTE [DISTWIDTH] IN BLOOD BY AUTOMATED COUNT: 15.6 % (ref 10–15)
GFR SERPL CREATININE-BSD FRML MDRD: 62 ML/MIN/{1.73_M2}
GLUCOSE SERPL-MCNC: 146 MG/DL (ref 70–99)
HCT VFR BLD AUTO: 36.5 % (ref 35–47)
HGB BLD-MCNC: 11 G/DL (ref 11.7–15.7)
MCH RBC QN AUTO: 24.6 PG (ref 26.5–33)
MCHC RBC AUTO-ENTMCNC: 30.1 G/DL (ref 31.5–36.5)
MCV RBC AUTO: 82 FL (ref 78–100)
PLATELET # BLD AUTO: 200 10E9/L (ref 150–450)
POTASSIUM SERPL-SCNC: 4.2 MMOL/L (ref 3.4–5.3)
RBC # BLD AUTO: 4.48 10E12/L (ref 3.8–5.2)
SODIUM SERPL-SCNC: 138 MMOL/L (ref 133–144)
TME LAST DOSE: NORMAL H
WBC # BLD AUTO: 3.5 10E9/L (ref 4–11)

## 2019-10-28 PROCEDURE — 80158 DRUG ASSAY CYCLOSPORINE: CPT | Performed by: INTERNAL MEDICINE

## 2019-10-28 NOTE — PATIENT INSTRUCTIONS
Nutrition Goals  1. Eat 3 meals with lean protein at each meal, along with a non-starchy vegetable or whole fruit, up to 1/2 c carb at a meal.  2. If needing a snack, have vegetables or protein snack (Hippeas). Limit snacking to less than 3x/day.    3. Walk 15 min daily, increasing as able. (Starting physical therapy)  4. Continue working on Meal planning for all meals.     Follow-up with RD in one month or as needed    Mireille Aguirre RD, LD  If you would like to schedule or reschedule an appointment with the RD, please call [phone]

## 2019-11-04 ENCOUNTER — OFFICE VISIT (OUTPATIENT)
Dept: PODIATRY | Facility: CLINIC | Age: 64
End: 2019-11-04
Payer: MEDICARE

## 2019-11-04 VITALS
WEIGHT: 163.8 LBS | BODY MASS INDEX: 30.95 KG/M2 | SYSTOLIC BLOOD PRESSURE: 141 MMHG | OXYGEN SATURATION: 97 % | HEART RATE: 90 BPM | DIASTOLIC BLOOD PRESSURE: 83 MMHG

## 2019-11-04 DIAGNOSIS — L60.2 ONYCHAUXIS: Primary | ICD-10-CM

## 2019-11-04 DIAGNOSIS — E11.49 TYPE II OR UNSPECIFIED TYPE DIABETES MELLITUS WITH NEUROLOGICAL MANIFESTATIONS, NOT STATED AS UNCONTROLLED(250.60) (H): ICD-10-CM

## 2019-11-04 PROCEDURE — 99213 OFFICE O/P EST LOW 20 MIN: CPT | Performed by: PODIATRIST

## 2019-11-04 NOTE — NURSING NOTE
Elizabeth Palma's chief complaint for this visit includes:  Chief Complaint   Patient presents with     RECHECK     3 month follow up - diabetic foot care     PCP: Horacio Sheridan    Referring Provider:  No referring provider defined for this encounter.    BP (!) 141/83 (BP Location: Left arm, Patient Position: Sitting, Cuff Size: Adult Regular)   Pulse 90   Wt 74.3 kg (163 lb 12.8 oz)   LMP  (LMP Unknown)   SpO2 97%   BMI 30.95 kg/m    Data Unavailable     Do you need any medication refills at today's visit? No    Elma Wiseman, CMA

## 2019-11-04 NOTE — PROGRESS NOTES
Past Medical History:   Diagnosis Date     Abnormal MRI, cervical spine 10/15/2011    2011; mild changes noted. Study done for left arm symptoms Impression:  1. Mild multilevel degenerative disc disease with no significant canal or neural stenosis seen. motion artifact on the STIR images in these are not interpretable. The remaining images were interpreted      Autosomal dominant polycystic kidney disease 2011     (Problem list name updated by automated process. Provider to review and confirm.)     CMC DJD(carpometacarpal degenerative joint disease), localized primary 3/5/2013     -donor kidney transplant 3/20/2014     Depressive disorder 11/15/2012     DM type 2 (diabetes mellitus, type 2) (H) 2013     Encounter for long-term (current) use of other medications 2015     Family history of tremor 10/17/2011     Gastroesophageal reflux disease      Generalized anxiety disorder 11/15/2012     Glaucoma      Hyperlipidemia 10/15/2011     Hyperparathyroidism, secondary (H) 2015     Hypertension     resolved     Immunosuppressed status (H) 3/20/2014     Major depressive disorder, recurrent episode, moderate (H) 11/15/2012     Obesity (BMI 30-39.9)      OP (osteoporosis) T score -3.8 2009 T-score -3.7      LION (obstructive sleep apnoea) 10/15/2012    intol to cpa     Pain in joint, forearm -- L unhealed Fx 2013     Premature menopause age 37 7/10/2012    OCP (vaginal bldg)-->HT which she stopped 2 mo later documented at 2007 visit (age 51).      Restless leg syndrome      Rib fractures 2013     Sensory loss 10/17/2011    Bottom of feet; uncertain if there is a neuropathy per notes.       Stiffness of joint, not elsewhere classified, hand 3/5/2013     Tremor 10/15/2011    head     Uncomplicated asthma      Patient Active Problem List   Diagnosis     Hypertension     Hyperlipidemia     Abnormal MRI, cervical spine     Sensory loss     Premature menopause age 37      OP (osteoporosis) T score -3.8     Major depressive disorder, recurrent episode, moderate (H)     Generalized anxiety disorder     CMC DJD(carpometacarpal degenerative joint disease), localized primary     Pain in joint, forearm -- L unhealed Fx     -donor kidney transplant     Immunosuppressed status (H)     Hyperparathyroidism, secondary (H)     Hyperparathyroidism (H)     Senile osteoporosis     Pain in joint involving ankle and foot     Nightmares associated with chronic post-traumatic stress disorder     Type 2 diabetes mellitus with peripheral neuropathy (H)     Posttraumatic stress disorder     Right knee pain     Left knee pain     Age-related osteoporosis without current pathological fracture     Narcissistic personality disorder (H)     Personal history of other drug therapy     Median sensory neuropathy, left     Marital conflict     Suicidal ideation     Autosomal dominant polycystic kidney disease     Secondary hyperparathyroidism (H)     Past Surgical History:   Procedure Laterality Date     ABDOMEN SURGERY       ANKLE SURGERY       C TRANSPLANTATION OF KIDNEY  3/2014     C/SECTION, LOW TRANSVERSE      x 2     CHOLECYSTECTOMY       COLONOSCOPY       ESOPHAGOSCOPY, GASTROSCOPY, DUODENOSCOPY (EGD), COMBINED N/A 2015    Procedure: COMBINED ESOPHAGOSCOPY, GASTROSCOPY, DUODENOSCOPY (EGD);  Surgeon: Sky Davey MD;  Location:  GI     ESOPHAGOSCOPY, GASTROSCOPY, DUODENOSCOPY (EGD), COMBINED N/A 2015    Procedure: COMBINED ESOPHAGOSCOPY, GASTROSCOPY, DUODENOSCOPY (EGD), BIOPSY SINGLE OR MULTIPLE;  Surgeon: Sky Davey MD;  Location:  GI     EYE SURGERY       LAPAROSCOPY, SURGICAL; REPAIR INCISIONAL OR VENTRAL HERNIA       ORTHOPEDIC SURGERY       HERMINIA EN Y BOWEL       WRIST SURGERY       Social History     Socioeconomic History     Marital status:      Spouse name: Rahat     Number of children: 2     Years of education: Not on file     Highest education  level: Not on file   Occupational History     Comment: part-time   Social Needs     Financial resource strain: Not on file     Food insecurity:     Worry: Not on file     Inability: Not on file     Transportation needs:     Medical: Not on file     Non-medical: Not on file   Tobacco Use     Smoking status: Former Smoker     Smokeless tobacco: Never Used   Substance and Sexual Activity     Alcohol use: No     Alcohol/week: 0.0 standard drinks     Drug use: No     Sexual activity: Yes     Partners: Male     Birth control/protection: None     Comment: 1 partner   Lifestyle     Physical activity:     Days per week: Not on file     Minutes per session: Not on file     Stress: Not on file   Relationships     Social connections:     Talks on phone: Not on file     Gets together: Not on file     Attends Christianity service: Not on file     Active member of club or organization: Not on file     Attends meetings of clubs or organizations: Not on file     Relationship status: Not on file     Intimate partner violence:     Fear of current or ex partner: Not on file     Emotionally abused: Not on file     Physically abused: Not on file     Forced sexual activity: Not on file   Other Topics Concern     Parent/sibling w/ CABG, MI or angioplasty before 65F 55M? Not Asked   Social History Narrative     Not on file     Family History   Problem Relation Age of Onset     Mental Illness Other         family hx     Heart Disease Other      Diabetes Other      Cancer Other      Genetic Disorder Father      Mental Illness Father      Diabetes Father      Hypertension Father      Hyperlipidemia Mother      Diabetes Mother      Hypertension Mother      Mental Illness Other      Diabetes Other      Glaucoma No family hx of      Macular Degeneration No family hx of      Hypertension No family hx of      Lab Results   Component Value Date    A1C 7.2 04/24/2019    A1C 7.4 11/27/2018    A1C 6.4 01/19/2018    A1C 6.5 05/19/2017    A1C 6.2 04/13/2017      Lab Results   Component Value Date    WBC 3.5 10/28/2019     Lab Results   Component Value Date    RBC 4.48 10/28/2019     Lab Results   Component Value Date    HGB 11.0 10/28/2019     Lab Results   Component Value Date    HCT 36.5 10/28/2019     No components found for: MCT  Lab Results   Component Value Date    MCV 82 10/28/2019     Lab Results   Component Value Date    MCH 24.6 10/28/2019     Lab Results   Component Value Date    MCHC 30.1 10/28/2019     Lab Results   Component Value Date    RDW 15.6 10/28/2019     Lab Results   Component Value Date     10/28/2019     Last Comprehensive Metabolic Panel:  Sodium   Date Value Ref Range Status   10/28/2019 138 133 - 144 mmol/L Final     Potassium   Date Value Ref Range Status   10/28/2019 4.2 3.4 - 5.3 mmol/L Final     Chloride   Date Value Ref Range Status   10/28/2019 109 94 - 109 mmol/L Final     Carbon Dioxide   Date Value Ref Range Status   10/28/2019 23 20 - 32 mmol/L Final     Anion Gap   Date Value Ref Range Status   10/28/2019 6 3 - 14 mmol/L Final     Glucose   Date Value Ref Range Status   10/28/2019 146 (H) 70 - 99 mg/dL Final     Urea Nitrogen   Date Value Ref Range Status   10/28/2019 12 7 - 30 mg/dL Final     Creatinine   Date Value Ref Range Status   10/28/2019 0.98 0.52 - 1.04 mg/dL Final     GFR Estimate   Date Value Ref Range Status   10/28/2019 62 >60 mL/min/[1.73_m2] Final     Comment:     Non  GFR Calc  Starting 12/18/2018, serum creatinine based estimated GFR (eGFR) will be   calculated using the Chronic Kidney Disease Epidemiology Collaboration   (CKD-EPI) equation.       Calcium   Date Value Ref Range Status   10/28/2019 9.6 8.5 - 10.1 mg/dL Final       SUBJECTIVE FINDINGS:  A 64-year-old female returns to clinic for foot check and onychauxis and she is diabetic with peripheral neuropathy.  She relates that since I we saw her last she got some blisters on her little toes and her medial first MPJ on the right.  She  was at [employer] and did a lot of walking.  By the third day, she had to get an electric scooter because her feet were bleeding and sore.  She put Neosporin on them and they seem to have gotten better.  She relates she does have numbness and tingling in her feet.  She is also wearing her diabetic shoes.  No specific injuries.        OBJECTIVE FINDINGS:  DP and PT 2/4 bilaterally.  She has dorsally contracted digits bilaterally.  She still has some residual venous congestion of the 5th toes bilaterally.  There is some dried skin bilaterally.  There is no erythema, no drainage, no odor, no calor.  No gross tendon voids bilaterally.  She has incurvated toenails with some dystrophy and subungual debris to differing degrees 1-5 bilaterally.       ASSESSMENT/PLAN:  Onychauxis bilaterally.  She has some healed blistering on her toes.  She is diabetic with peripheral neuropathy.  Diagnosis and treatment options discussed with her.  The left ankle seems to be doing better.  She relates it still bothers her a little bit.  All the nails were reduced bilaterally upon consent.  She will return to clinic and see me in 2-3 months.

## 2019-11-04 NOTE — LETTER
2019         RE: Elizabeth Palma  [address]  [facility] [address]        Dear Colleague,    Thank you for referring your patient, Elizabeth Palma, to the [facility]. Please see a copy of my visit note below.    Past Medical History:   Diagnosis Date     Abnormal MRI, cervical spine 10/15/2011    2011; mild changes noted. Study done for left arm symptoms Impression:  1. Mild multilevel degenerative disc disease with no significant canal or neural stenosis seen. motion artifact on the STIR images in these are not interpretable. The remaining images were interpreted      Autosomal dominant polycystic kidney disease 2011     (Problem list name updated by automated process. Provider to review and confirm.)     CMC DJD(carpometacarpal degenerative joint disease), localized primary 3/5/2013     -donor kidney transplant 3/20/2014     Depressive disorder 11/15/2012     DM type 2 (diabetes mellitus, type 2) (H) 2013     Encounter for long-term (current) use of other medications 2015     Family history of tremor 10/17/2011     Gastroesophageal reflux disease      Generalized anxiety disorder 11/15/2012     Glaucoma      Hyperlipidemia 10/15/2011     Hyperparathyroidism, secondary (H) 2015     Hypertension     resolved     Immunosuppressed status (H) 3/20/2014     Major depressive disorder, recurrent episode, moderate (H) 11/15/2012     Obesity (BMI 30-39.9)      OP (osteoporosis) T score -3.8 2009 T-score -3.7      LION (obstructive sleep apnoea) 10/15/2012    intol to cpa     Pain in joint, forearm -- L unhealed Fx 2013     Premature menopause age 37 7/10/2012    OCP (vaginal bldg)-->HT which she stopped 2 mo later documented at 2007 visit (age 51).      Restless leg syndrome      Rib fractures 2013     Sensory loss 10/17/2011    Bottom of feet; uncertain if there is a neuropathy per notes.       Stiffness of joint,  not elsewhere classified, hand 3/5/2013     Tremor 10/15/2011    head     Uncomplicated asthma      Patient Active Problem List   Diagnosis     Hypertension     Hyperlipidemia     Abnormal MRI, cervical spine     Sensory loss     Premature menopause age 37     OP (osteoporosis) T score -3.8     Major depressive disorder, recurrent episode, moderate (H)     Generalized anxiety disorder     CMC DJD(carpometacarpal degenerative joint disease), localized primary     Pain in joint, forearm -- L unhealed Fx     -donor kidney transplant     Immunosuppressed status (H)     Hyperparathyroidism, secondary (H)     Hyperparathyroidism (H)     Senile osteoporosis     Pain in joint involving ankle and foot     Nightmares associated with chronic post-traumatic stress disorder     Type 2 diabetes mellitus with peripheral neuropathy (H)     Posttraumatic stress disorder     Right knee pain     Left knee pain     Age-related osteoporosis without current pathological fracture     Narcissistic personality disorder (H)     Personal history of other drug therapy     Median sensory neuropathy, left     Marital conflict     Suicidal ideation     Autosomal dominant polycystic kidney disease     Secondary hyperparathyroidism (H)     Past Surgical History:   Procedure Laterality Date     ABDOMEN SURGERY       ANKLE SURGERY       C TRANSPLANTATION OF KIDNEY  3/2014     C/SECTION, LOW TRANSVERSE      x 2     CHOLECYSTECTOMY       COLONOSCOPY       ESOPHAGOSCOPY, GASTROSCOPY, DUODENOSCOPY (EGD), COMBINED N/A 2015    Procedure: COMBINED ESOPHAGOSCOPY, GASTROSCOPY, DUODENOSCOPY (EGD);  Surgeon: Sky Davey MD;  Location:  GI     ESOPHAGOSCOPY, GASTROSCOPY, DUODENOSCOPY (EGD), COMBINED N/A 2015    Procedure: COMBINED ESOPHAGOSCOPY, GASTROSCOPY, DUODENOSCOPY (EGD), BIOPSY SINGLE OR MULTIPLE;  Surgeon: Sky Davey MD;  Location:  GI     EYE SURGERY       LAPAROSCOPY, SURGICAL; REPAIR INCISIONAL OR VENTRAL  HERNIA       ORTHOPEDIC SURGERY       HERMINIA EN Y BOWEL  1990     WRIST SURGERY       Social History     Socioeconomic History     Marital status:      Spouse name: Rahat     Number of children: 2     Years of education: Not on file     Highest education level: Not on file   Occupational History     Comment: part-time   Social Needs     Financial resource strain: Not on file     Food insecurity:     Worry: Not on file     Inability: Not on file     Transportation needs:     Medical: Not on file     Non-medical: Not on file   Tobacco Use     Smoking status: Former Smoker     Smokeless tobacco: Never Used   Substance and Sexual Activity     Alcohol use: No     Alcohol/week: 0.0 standard drinks     Drug use: No     Sexual activity: Yes     Partners: Male     Birth control/protection: None     Comment: 1 partner   Lifestyle     Physical activity:     Days per week: Not on file     Minutes per session: Not on file     Stress: Not on file   Relationships     Social connections:     Talks on phone: Not on file     Gets together: Not on file     Attends Spiritism service: Not on file     Active member of club or organization: Not on file     Attends meetings of clubs or organizations: Not on file     Relationship status: Not on file     Intimate partner violence:     Fear of current or ex partner: Not on file     Emotionally abused: Not on file     Physically abused: Not on file     Forced sexual activity: Not on file   Other Topics Concern     Parent/sibling w/ CABG, MI or angioplasty before 65F 55M? Not Asked   Social History Narrative     Not on file     Family History   Problem Relation Age of Onset     Mental Illness Other         family hx     Heart Disease Other      Diabetes Other      Cancer Other      Genetic Disorder Father      Mental Illness Father      Diabetes Father      Hypertension Father      Hyperlipidemia Mother      Diabetes Mother      Hypertension Mother      Mental Illness Other      Diabetes  Other      Glaucoma No family hx of      Macular Degeneration No family hx of      Hypertension No family hx of      Lab Results   Component Value Date    A1C 7.2 04/24/2019    A1C 7.4 11/27/2018    A1C 6.4 01/19/2018    A1C 6.5 05/19/2017    A1C 6.2 04/13/2017     Lab Results   Component Value Date    WBC 3.5 10/28/2019     Lab Results   Component Value Date    RBC 4.48 10/28/2019     Lab Results   Component Value Date    HGB 11.0 10/28/2019     Lab Results   Component Value Date    HCT 36.5 10/28/2019     No components found for: MCT  Lab Results   Component Value Date    MCV 82 10/28/2019     Lab Results   Component Value Date    MCH 24.6 10/28/2019     Lab Results   Component Value Date    MCHC 30.1 10/28/2019     Lab Results   Component Value Date    RDW 15.6 10/28/2019     Lab Results   Component Value Date     10/28/2019     Last Comprehensive Metabolic Panel:  Sodium   Date Value Ref Range Status   10/28/2019 138 133 - 144 mmol/L Final     Potassium   Date Value Ref Range Status   10/28/2019 4.2 3.4 - 5.3 mmol/L Final     Chloride   Date Value Ref Range Status   10/28/2019 109 94 - 109 mmol/L Final     Carbon Dioxide   Date Value Ref Range Status   10/28/2019 23 20 - 32 mmol/L Final     Anion Gap   Date Value Ref Range Status   10/28/2019 6 3 - 14 mmol/L Final     Glucose   Date Value Ref Range Status   10/28/2019 146 (H) 70 - 99 mg/dL Final     Urea Nitrogen   Date Value Ref Range Status   10/28/2019 12 7 - 30 mg/dL Final     Creatinine   Date Value Ref Range Status   10/28/2019 0.98 0.52 - 1.04 mg/dL Final     GFR Estimate   Date Value Ref Range Status   10/28/2019 62 >60 mL/min/[1.73_m2] Final     Comment:     Non  GFR Calc  Starting 12/18/2018, serum creatinine based estimated GFR (eGFR) will be   calculated using the Chronic Kidney Disease Epidemiology Collaboration   (CKD-EPI) equation.       Calcium   Date Value Ref Range Status   10/28/2019 9.6 8.5 - 10.1 mg/dL Final        SUBJECTIVE FINDINGS:  A 64-year-old female returns to clinic for foot check and onychauxis and she is diabetic with peripheral neuropathy.  She relates that since I we saw her last she got some blisters on her little toes and her medial first MPJ on the right.  She was at [employer] and did a lot of walking.  By the third day, she had to get an electric scooter because her feet were bleeding and sore.  She put Neosporin on them and they seem to have gotten better.  She relates she does have numbness and tingling in her feet.  She is also wearing her diabetic shoes.  No specific injuries.        OBJECTIVE FINDINGS:  DP and PT 2/4 bilaterally.  She has dorsally contracted digits bilaterally.  She still has some residual venous congestion of the 5th toes bilaterally.  There is some dried skin bilaterally.  There is no erythema, no drainage, no odor, no calor.  No gross tendon voids bilaterally.  She has incurvated toenails with some dystrophy and subungual debris to differing degrees 1-5 bilaterally.       ASSESSMENT/PLAN:  Onychauxis bilaterally.  She has some healed blistering on her toes.  She is diabetic with peripheral neuropathy.  Diagnosis and treatment options discussed with her.  The left ankle seems to be doing better.  She relates it still bothers her a little bit.  All the nails were reduced bilaterally upon consent.  She will return to clinic and see me in 2-3 months.         Again, thank you for allowing me to participate in the care of your patient.        Sincerely,        Javier Tuttle DPM

## 2019-11-04 NOTE — PATIENT INSTRUCTIONS
Thanks for coming today.  [facility]  [address] [place] [address]    To schedule future appointments in Ortho Clinic, you may call [phone].    To schedule ordered imaging by your provider:   Call [facility] [phone]    To schedule an injection ordered by your provider:  Call [facility] Injection scheduling line: [phone]  [facility] available online at:  [email or web address]    Please call if any further questions or concerns ([phone]).  Clinic hours 8 am to 5 pm.    Return to clinic (call) if symptoms worsen or fail to improve.

## 2019-11-12 DIAGNOSIS — D84.9 IMMUNOSUPPRESSION (H): ICD-10-CM

## 2019-11-12 DIAGNOSIS — Z94.0 KIDNEY TRANSPLANTED: Primary | ICD-10-CM

## 2019-11-12 RX ORDER — MYCOPHENOLATE MOFETIL 250 MG/1
1000 CAPSULE ORAL 2 TIMES DAILY
Qty: 240 CAPSULE | Refills: 11 | Status: SHIPPED | OUTPATIENT
Start: 2019-11-12 | End: 2020-11-20

## 2019-11-15 ENCOUNTER — TELEPHONE (OUTPATIENT)
Dept: TRANSPLANT | Facility: CLINIC | Age: 64
End: 2019-11-15

## 2019-11-15 DIAGNOSIS — Z94.0 KIDNEY TRANSPLANTED: Primary | ICD-10-CM

## 2019-11-15 DIAGNOSIS — D84.9 IMMUNOSUPPRESSION (H): ICD-10-CM

## 2019-11-15 NOTE — TELEPHONE ENCOUNTER
ISSUE:   Confirm that pt is on mycophenolate 1,000 mg BID    PLAN:  Per Dr Jimenez, check MPA level     OUTCOME:   Call placed to patient. Confirms that she is taking 1,000 mg BID of mycophenolate.   Explained plan to check MPA level with next set of labs. Pt v/u.

## 2019-11-21 ENCOUNTER — OFFICE VISIT (OUTPATIENT)
Dept: PSYCHIATRY | Facility: CLINIC | Age: 64
End: 2019-11-21
Payer: MEDICARE

## 2019-11-21 VITALS
DIASTOLIC BLOOD PRESSURE: 85 MMHG | BODY MASS INDEX: 30.61 KG/M2 | WEIGHT: 162 LBS | SYSTOLIC BLOOD PRESSURE: 140 MMHG | HEART RATE: 67 BPM

## 2019-11-21 DIAGNOSIS — F33.1 MAJOR DEPRESSIVE DISORDER, RECURRENT EPISODE, MODERATE (H): ICD-10-CM

## 2019-11-21 DIAGNOSIS — F43.12 NIGHTMARES ASSOCIATED WITH CHRONIC POST-TRAUMATIC STRESS DISORDER: ICD-10-CM

## 2019-11-21 DIAGNOSIS — F51.5 NIGHTMARES ASSOCIATED WITH CHRONIC POST-TRAUMATIC STRESS DISORDER: ICD-10-CM

## 2019-11-21 RX ORDER — ARIPIPRAZOLE 2 MG/1
2 TABLET ORAL DAILY
Qty: 30 TABLET | Refills: 3 | Status: SHIPPED | OUTPATIENT
Start: 2019-11-21 | End: 2020-01-30

## 2019-11-21 RX ORDER — VILAZODONE HYDROCHLORIDE 40 MG/1
40 TABLET ORAL DAILY
Qty: 30 TABLET | Refills: 3 | Status: SHIPPED | OUTPATIENT
Start: 2019-11-21 | End: 2020-01-30

## 2019-11-21 RX ORDER — PRAZOSIN HYDROCHLORIDE 2 MG/1
CAPSULE ORAL
Qty: 30 CAPSULE | Refills: 3 | Status: SHIPPED | OUTPATIENT
Start: 2019-11-21 | End: 2020-01-30

## 2019-11-21 RX ORDER — PRAZOSIN HYDROCHLORIDE 5 MG/1
CAPSULE ORAL
Qty: 90 CAPSULE | Refills: 3 | Status: SHIPPED | OUTPATIENT
Start: 2019-11-21 | End: 2020-01-30

## 2019-11-21 ASSESSMENT — PAIN SCALES - GENERAL: PAINLEVEL: MILD PAIN (3)

## 2019-11-21 ASSESSMENT — PATIENT HEALTH QUESTIONNAIRE - PHQ9: SUM OF ALL RESPONSES TO PHQ QUESTIONS 1-9: 8

## 2019-11-21 NOTE — PATIENT INSTRUCTIONS
Increase NAC to 2 capsules (1200 mg) twice a day.  If the skin picking does not improve in 1 week and you haven't had any nausea, you can increase NAC to 3 capsules twice daily.  Take NAC with over the counter vitamin C to help with absorption.    Use light box for 30-60 minutes first thing in the morning.
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
## 2019-11-22 NOTE — PROGRESS NOTES
"Nutrition Reassessment  Reason For Visit:  Elizabeth Palma is a 64 year-old female presenting today for nutrition follow-up, s/p \"gastric bypass in 1990 at [facility]\" per Pt report. She is seeing medical weight management.  She was referred by Dr. Irene.  Note: Pt had a kidney transplant on March 20, 2014.    Pt was accompanied by her .     Pt signed Medicare ABN form which is good for 1 year (10/28/19-10/28/20).    Patient with Co-morbidities of obesity including:  Type II DM yes  Renal Failure no  Sleep apnea yes  Hypertension no   Dyslipidemia yes  Joint pain no  Back pain no  GERD yes     Anthropometrics:  Height: 61\"  Pre-op Weight: 265 lbs per Pt report  (Pt reported that she initially was at 215 lbs in 2015)    Current Weight: 159.4 lbs (-2.8 lbs in the past 1 month)    Current Vitamins/Minerals: 3000 International Units vitamin D/day, Calcium 2x daily, vitamin C, vitamin B12 daily, B-complex  *Pt stated that she was told not to take other vitamins/minerals by MD.   3/27/19: Started Naltrexone at most recent MD appointment  4/30/19: patient reported that she is more hungry and is eating larger portions since medication change  5/28/19: taking Topiramate and Naltrexone   11:26: Continues topiramate and Naltrexone. Pt states it took a couple months before she felt a benefit from Naltrexone.     Nutrition History:  - Lactose intolerance ever since bariatric surgery.  - Pt stated that she has osteoporosis; however, she stated that her doctors don't want her to take any vitamins or minerals due to her kidney transplant.  - Pt states she does not like the taste of protein drinks (shakes and clear liquids), yogurt, beans/lentils or any of the high protein products we have in clinic. Pt reports that she does not like meat, and does not eat a lot of it.   - Pt and  follow a Kosher diet   - Pt also stated on previous visits that she will not record food intake due to the fact that every time she does " "this, she simply does not eat as she doesn't want to record.  She stated that her therapist strongly advises against recording food intake for this reason.     Physical Activity Note: She reported doing some walking in hallways and in hallways at work, but no structured exercise. At a previous RD visit reported that she has a tendency to break bones so she is limited with what exercises she does.     Recent Diet Recall:  Breakfast:  1/2 bagel, or toast, or grits + cup of coffee with almond milk (occ splenda)  Lunch: Impossible burger (1/4); Spaghetti-O's (1/2 can); 1 pc of cheese  Dinner: Salad + 1/4 pasta (with cheese) at [employer]; 1/2 frozen mini lasagna; fish     Snacks: \"A lot less snacking\" 4x/day - eating pea snacks (5-6 servings over 3 days); mini pretzels (4-5 per serving); 1/2 apples; fruit compote    Beverages: Propel (4 x 16 oz); iced tea (with artifical sweetner); SF rootbeer  Dining Out: Frequently, no change    Progress Towards Previous Goals:  1. Eat 3 meals with lean protein at each meal, along with a non-starchy vegetable or whole fruit, up to 1/2 c carb at a meal. - Not met. Protein is very difficult for pt to get enough of, especially with frequency of dining out.   2. If needing a snack, have vegetables or protein snack (Hippeas). Limit 3x/day.  - Improving, states she has cut-down on snacking recently    3. Walk 15 min daily, increasing as able. (Starting physical therapy) - Continues. Finished PT recently  4. Continue working on Meal planning for all meals.  - Continues to eat out frequently       Nutrition Prescription:   Grams Protein: 60 (minimum) - Not meeting consistently.   Amount of Fluid: 48-64 oz    Nutrition Diagnosis  Current: Overweight related to history of excessive energy intake, variable eating habits/intake and lack of sufficient exercise as evidenced by pt report and BMI > 25. continues    Intervention  Intervention At Appointment:  Materials/Education provided: Reviewed " her progress towards previous goals. Pt reports she is more inclined to eat meats she makes at home in the [employer] (pulled chicken, turkey), and is willing to start eating cottage cheese and eggs at meals. Encouraged pt to eat more meals at home/prepare more meals at home to help meet protein needs. Reviewed importance of adequate protein intake on a daily basis. Discussed using pea snacks, or fruit as snack options vs potato chips. Encouraged pt to use exercises she learned at physical therapy at home to continue regular exercise. Provided encouragement and support. Provided pt with list of goals.      Patient Understanding: good  Expected Compliance: good with continued RD follow up.     Goals:   1. Eat 3 meals with lean protein at each meal, along with a non-starchy vegetable or whole fruit, up to 1/2 c carb at a meal.  2. If needing a snack, have vegetables or protein snack. Limit 3x/day.    3. Walk 15 min daily, increasing as able. (Starting physical therapy)  4. Continue working on Meal planning for all meals. Consider buying a non-fat cottage cheese, and/or eggs instead of bagel/grits/toast for breakfast.      Follow-Up: 1 month or PRN     Time spent with patient: 15 minutes.  Mireille Aguirre, RD, LD

## 2019-11-26 ENCOUNTER — RESULTS ONLY (OUTPATIENT)
Dept: OTHER | Facility: CLINIC | Age: 64
End: 2019-11-26

## 2019-11-26 ENCOUNTER — TELEPHONE (OUTPATIENT)
Dept: TRANSPLANT | Facility: CLINIC | Age: 64
End: 2019-11-26

## 2019-11-26 ENCOUNTER — ALLIED HEALTH/NURSE VISIT (OUTPATIENT)
Dept: SURGERY | Facility: CLINIC | Age: 64
End: 2019-11-26
Payer: MEDICARE

## 2019-11-26 VITALS — BODY MASS INDEX: 30.09 KG/M2 | HEIGHT: 61 IN | WEIGHT: 159.4 LBS

## 2019-11-26 DIAGNOSIS — Z94.0 KIDNEY TRANSPLANTED: Primary | ICD-10-CM

## 2019-11-26 DIAGNOSIS — E11.42 TYPE 2 DIABETES MELLITUS WITH DIABETIC POLYNEUROPATHY, WITHOUT LONG-TERM CURRENT USE OF INSULIN (H): ICD-10-CM

## 2019-11-26 DIAGNOSIS — Z94.0 KIDNEY TRANSPLANTED: ICD-10-CM

## 2019-11-26 DIAGNOSIS — D84.9 IMMUNOSUPPRESSION (H): ICD-10-CM

## 2019-11-26 DIAGNOSIS — Z48.298 AFTERCARE FOLLOWING ORGAN TRANSPLANT: ICD-10-CM

## 2019-11-26 LAB
ANION GAP SERPL CALCULATED.3IONS-SCNC: 9 MMOL/L (ref 3–14)
BUN SERPL-MCNC: 17 MG/DL (ref 7–30)
CALCIUM SERPL-MCNC: 8.5 MG/DL (ref 8.5–10.1)
CHLORIDE SERPL-SCNC: 109 MMOL/L (ref 94–109)
CO2 SERPL-SCNC: 20 MMOL/L (ref 20–32)
CREAT SERPL-MCNC: 0.93 MG/DL (ref 0.52–1.04)
CYCLOSPORINE BLD LC/MS/MS-MCNC: 777 UG/L (ref 50–400)
ERYTHROCYTE [DISTWIDTH] IN BLOOD BY AUTOMATED COUNT: 15.4 % (ref 10–15)
GFR SERPL CREATININE-BSD FRML MDRD: 66 ML/MIN/{1.73_M2}
GLUCOSE SERPL-MCNC: 137 MG/DL (ref 70–99)
HCT VFR BLD AUTO: 37.8 % (ref 35–47)
HGB BLD-MCNC: 11.4 G/DL (ref 11.7–15.7)
MCH RBC QN AUTO: 24.3 PG (ref 26.5–33)
MCHC RBC AUTO-ENTMCNC: 30.2 G/DL (ref 31.5–36.5)
MCV RBC AUTO: 80 FL (ref 78–100)
PLATELET # BLD AUTO: 203 10E9/L (ref 150–450)
POTASSIUM SERPL-SCNC: 3.5 MMOL/L (ref 3.4–5.3)
RBC # BLD AUTO: 4.7 10E12/L (ref 3.8–5.2)
SODIUM SERPL-SCNC: 138 MMOL/L (ref 133–144)
TME LAST DOSE: ABNORMAL H
WBC # BLD AUTO: 4.5 10E9/L (ref 4–11)

## 2019-11-26 PROCEDURE — 80158 DRUG ASSAY CYCLOSPORINE: CPT | Performed by: INTERNAL MEDICINE

## 2019-11-26 PROCEDURE — 86833 HLA CLASS II HIGH DEFIN QUAL: CPT | Performed by: INTERNAL MEDICINE

## 2019-11-26 PROCEDURE — 86832 HLA CLASS I HIGH DEFIN QUAL: CPT | Performed by: INTERNAL MEDICINE

## 2019-11-26 PROCEDURE — 80180 DRUG SCRN QUAN MYCOPHENOLATE: CPT | Performed by: INTERNAL MEDICINE

## 2019-11-26 ASSESSMENT — MIFFLIN-ST. JEOR: SCORE: 1220.41

## 2019-11-26 NOTE — TELEPHONE ENCOUNTER
Call placed to patient. She reports she forgot and took both her cyclosporine and mycophenolate prior to labs. (MPA level still pending but will be inaccurate)  She will repeat levels with labs next month.

## 2019-11-26 NOTE — PATIENT INSTRUCTIONS
Nutrition Goals  1. Eat 3 meals with lean protein at each meal, along with a non-starchy vegetable or whole fruit, up to 1/2 c carb at a meal.  2. If needing a snack, have vegetables or protein snack. Limit 3x/day.    3. Walk 15 min daily, increasing as able. (Starting physical therapy)  4. Continue working on Meal planning for all meals. Consider buying a non-fat cottage cheese, and/or eggs instead of bagel/grits/toast for breakfast.    Follow-up with RD as needed    Mireille Aguirre, SUJATA, LD  If you would like to schedule or reschedule an appointment with the RD, please call [phone]

## 2019-11-26 NOTE — TELEPHONE ENCOUNTER
DATE:  11/26/2019   TIME OF RECEIPT FROM LAB:  1430  LAB TEST:  Cyclosporin Level  LAB VALUE:  777  RESULTS GIVEN WITH READ-BACK TO :Lorelei Treviño  TIME LAB VALUE REPORTED TO PROVIDER:   2483

## 2019-11-27 ENCOUNTER — TELEPHONE (OUTPATIENT)
Dept: TRANSPLANT | Facility: CLINIC | Age: 64
End: 2019-11-27

## 2019-11-27 DIAGNOSIS — J45.990 EXERCISE-INDUCED ASTHMA: ICD-10-CM

## 2019-11-27 LAB
DONOR IDENTIFICATION: NORMAL
DSA COMMENTS: NORMAL
DSA PRESENT: NO
DSA TEST METHOD: NORMAL
MYCOPHENOLATE SERPL LC/MS/MS-MCNC: 16.66 MG/L (ref 1–3.5)
MYCOPHENOLATE-G SERPL LC/MS/MS-MCNC: 109.8 MG/L (ref 30–95)
ORGAN: NORMAL
SA1 CELL: NORMAL
SA1 COMMENTS: NORMAL
SA1 HI RISK ABY: NORMAL
SA1 MOD RISK ABY: NORMAL
SA1 TEST METHOD: NORMAL
SA2 CELL: NORMAL
SA2 COMMENTS: NORMAL
SA2 HI RISK ABY UA: NORMAL
SA2 MOD RISK ABY: NORMAL
SA2 TEST METHOD: NORMAL
TME LAST DOSE: ABNORMAL H
UNACCEPTABLE ANTIGEN: NORMAL
UNOS CPRA: 0

## 2019-11-27 RX ORDER — METFORMIN HCL 500 MG
1500 TABLET, EXTENDED RELEASE 24 HR ORAL
Qty: 270 TABLET | Refills: 0 | Status: SHIPPED | OUTPATIENT
Start: 2019-11-27 | End: 2020-03-03

## 2019-11-27 RX ORDER — ALBUTEROL SULFATE 90 UG/1
2 AEROSOL, METERED RESPIRATORY (INHALATION) EVERY 6 HOURS PRN
Qty: 1 INHALER | Refills: 5 | Status: SHIPPED | OUTPATIENT
Start: 2019-11-27 | End: 2021-10-28

## 2019-11-27 NOTE — TELEPHONE ENCOUNTER
[facility] Call Center    Phone Message    May a detailed message be left on voicemail: yes    Reason for Call: Medication Refill Request    Has the patient contacted the pharmacy for the refill? Yes   Name of medication being requested: albuterol (PROAIR HFA/PROVENTIL HFA/VENTOLIN HFA) 108 (90 Base) MCG/ACT inhaler  Provider who prescribed the medication: [facility]  Pharmacy:      [id number] - [facility] [address]    Date medication is needed: Patients  called and reported that they need a new script for this inhaler, he stated patient woke up wheezing this morning         Action Taken: Message routed to:  [facility] ([facility]): BETTY

## 2019-11-27 NOTE — TELEPHONE ENCOUNTER
metFORMIN HCl ER Oral Tablet Extended Release 24 Hour 500 MG     Last Written Prescription Date:  6/7/2019  Last Fill Quantity: 270,   # refills: 1  Last Office Visit : 7/16/2019  Future Office visit:  None    Routing refill request to provider for review/approval because:  Blood pressure out of range, A1C & LDL Due  11/21/19 (!) 140/85   11/04/19 (!) 141/83   10/07/19 128/72      Notified Xin to sent up labs and office due.    Sending to clinic for review    90 Day sent to pharmacy for Pt care    Khushboo Emerson RN  Central Triage Red Flags/Med Refills

## 2019-11-27 NOTE — TELEPHONE ENCOUNTER
DATE:  11/27/2019   TIME OF RECEIPT FROM LAB:  2:02 PM  LAB TEST:  Mycophenolic acid  LAB VALUE:  16.6, lab drawn 11/26/19 @ 9:30 AM  RESULTS GIVEN WITH READ-BACK TO (PROVIDER):  Lorelei Treviño RN  TIME LAB VALUE REPORTED TO PROVIDER:   2:06 PM

## 2019-11-27 NOTE — TELEPHONE ENCOUNTER
Last Clinic Visit: 7/16/2019  [facility]  FYI sent to nurse RE: new onset of symptoms this morning.

## 2019-12-02 ENCOUNTER — OFFICE VISIT (OUTPATIENT)
Dept: FAMILY MEDICINE | Facility: CLINIC | Age: 64
End: 2019-12-02
Payer: MEDICARE

## 2019-12-02 VITALS
HEIGHT: 61 IN | HEART RATE: 91 BPM | SYSTOLIC BLOOD PRESSURE: 139 MMHG | RESPIRATION RATE: 16 BRPM | TEMPERATURE: 98.2 F | BODY MASS INDEX: 30.02 KG/M2 | WEIGHT: 159 LBS | DIASTOLIC BLOOD PRESSURE: 86 MMHG | OXYGEN SATURATION: 98 %

## 2019-12-02 DIAGNOSIS — L73.9 FOLLICULITIS: Primary | ICD-10-CM

## 2019-12-02 RX ORDER — MUPIROCIN 20 MG/G
OINTMENT TOPICAL 3 TIMES DAILY
Qty: 1 G | Refills: 0 | Status: SHIPPED | OUTPATIENT
Start: 2019-12-02 | End: 2021-06-18

## 2019-12-02 ASSESSMENT — ENCOUNTER SYMPTOMS
SHORTNESS OF BREATH: 0
FEVER: 0
COUGH: 0
CHILLS: 0
FATIGUE: 1
WEAKNESS: 0
NIGHT SWEATS: 0
DECREASED APPETITE: 0
SWOLLEN GLANDS: 0

## 2019-12-02 ASSESSMENT — PAIN SCALES - GENERAL: PAINLEVEL: NO PAIN (0)

## 2019-12-02 ASSESSMENT — MIFFLIN-ST. JEOR: SCORE: 1218.6

## 2019-12-02 NOTE — PROGRESS NOTES
"       HPI       Elizabeth Palma is a 64 year old woman PMHx of renal transplantation in 2013, asthma, tremor, RLS, depression, LION, HTN, HLD, anxiety, GERD, type 2 diabetes who presents for the new concern(s) of:.  Chief Complaint   Patient presents with     Derm Problem     Pt complains of a rash for the past day.     Noticed a bump in left armpit last night. Was slightly tender last night and continues to be sore this morning. Uncertain if has been draining anything. No fevers, no tight clothes. Denies any swollen lymph nodes. Does endorse mild fatigue worse than baseline. No nausea or vomiting. Cannot see if area surrounding is read. Has had first dose of shingles vaccine. Originally was hoping that tender area would \"just be a pimple and I could be on my way\". No any other rash noted, does not feel to be spreading, has not developed any more raised areas than the singular bump.     Blood glucose readings have been close to baseline. Is taking immunosuppressive medication as ordered. Does not routinely shave armpits anymore, denies any extensive hair growth.     Problem, Medication and Allergy Lists were       Current Outpatient Medications   Medication Sig Dispense Refill     acetaminophen (TYLENOL) 325 MG tablet Take 325-650 mg by mouth as needed       acetylcysteine (N-ACETYL-L-CYSTEINE) 600 MG CAPS capsule Take 2 400 mg caps two times daily for total daily dose of 800 mg 30 capsule      albuterol (PROAIR HFA/PROVENTIL HFA/VENTOLIN HFA) 108 (90 Base) MCG/ACT inhaler Inhale 2 puffs into the lungs every 6 hours as needed for shortness of breath / dyspnea or wheezing 1 Inhaler 5     ARIPiprazole (ABILIFY) 2 MG tablet Take 1 tablet (2 mg) by mouth daily 30 tablet 3     aspirin EC 81 MG EC tablet Take 1 tablet (81 mg) by mouth daily 30 tablet 5     blood glucose monitoring (ACCU-CHEK RALPH PLUS) meter device kit   0     blood glucose monitoring (NO BRAND SPECIFIED) meter device kit Use to test blood sugar 2 times " daily or as directed. 1 kit 0     blood glucose monitoring (NO BRAND SPECIFIED) test strip Use to test blood sugars 2 times daily or as directed 100 strip 11     blood glucose monitoring (SOFTCLIX) lancets Use to test blood sugar 2 times daily or as directed. 1 Box 11     Cholecalciferol (VITAMIN D) 1000 UNITS capsule 2,000 Units  30 capsule      cyanocolbalamin (VITAMIN  B-12) 1000 MCG tablet Take 1 tablet by mouth daily. (Patient taking differently: Take 1,000 mcg by mouth every other day ) 30 tablet 0     cycloSPORINE modified (GENERIC EQUIVALENT) 25 MG capsule Take 5 capsules (125 mg) by mouth 2 times daily TAKE 5 CAPSULES (125MG) BY MOUTH TWO TIMES A  capsule 11     cycloSPORINE modified (GENERIC EQUIVALENT) 25 MG capsule Take 5 capsules (125 mg) by mouth 2 times daily 300 capsule 6     econazole nitrate 1 % cream Apply topically daily 85 g 3     estradiol (VAGIFEM) 10 MCG TABS vaginal tablet Place 1 tablet (10 mcg) vaginally twice a week 30 tablet 3     fluticasone (FLONASE) 50 MCG/ACT nasal spray Spray 1 spray into both nostrils daily *SHAKE LIQUID GENTLY 16 mL 2     furosemide (LASIX) 20 MG tablet Take 1 tablet (20 mg) by mouth daily 90 tablet 3     latanoprost (XALATAN) 0.005 % ophthalmic solution Place 1 drop into both eyes At Bedtime 7.5 mL 2     metFORMIN (GLUCOPHAGE-XR) 500 MG 24 hr tablet Take 3 tablets (1,500 mg) by mouth daily (with dinner) 270 tablet 0     mupirocin (BACTROBAN) 2 % external ointment Apply topically 3 times daily 1 g 0     mycophenolate (GENERIC EQUIVALENT) 250 MG capsule Take 4 capsules (1,000 mg) by mouth 2 times daily 240 capsule 11     omeprazole (PRILOSEC) 40 MG DR capsule Take 1 capsule (40 mg) by mouth daily 90 capsule 3     ondansetron (ZOFRAN-ODT) 4 MG ODT tab Take 1 tablet (4 mg) by mouth every 6 hours as needed 20 tablet 3     order for DME Equipment being ordered: DME  [id number]   Ankle support, sm, fig 8, lace up 1 Device 0     order for DME Walker with  front wheels and a seat. 1 Units 0     phentermine (ADIPEX-P) 15 MG capsule Take 1 capsule (15 mg) by mouth every morning 30 capsule 5     Polyvinyl Alcohol-Povidone (REFRESH OP) Apply to eye as needed Both eyes       prazosin (MINIPRESS) 2 MG capsule Take 2mg cap + 3 x 5mg caps (15mg) at bedtime (total dose=17mg) 30 capsule 3     prazosin (MINIPRESS) 5 MG capsule Take 3 x 5mg (15mg) caps + 1 x 2mg caps at bedtime (total dose=17mg) 90 capsule 3     simvastatin (ZOCOR) 20 MG tablet Take 1 tablet (20 mg) by mouth At Bedtime 90 tablet 3     sulfamethoxazole-trimethoprim (BACTRIM/SEPTRA) 400-80 MG tablet Take 1 tablet by mouth daily 90 tablet 3     topiramate (TOPAMAX) 200 MG tablet Take 1 tablet (200 mg) by mouth 2 times daily 60 tablet 5     Vaginal Lubricant (REPLENS) GEL Use vaginally as needed. Can use up to 3 times per week. 35 g 11     vilazodone (VIIBRYD) 40 MG TABS tablet Take 1 tablet (40 mg) by mouth daily 30 tablet 3         Allergies   Allergen Reactions     Percocet [Oxycodone-Acetaminophen] Nausea and Vomiting     Novocain [Procaine Hcl] Hives     Had reaction 25 years ago to old renetta. Pt reports multiple injections of lidocaine since then without reaction.  Tolerated lidocaine injection today without difficulty.  Osmar Mark MD IR Service.     Patient is a new patient to this clinic and so  I reviewed/updated the Past Medical History, the Family History and the Social History .           Review of Systems:   Review of Systems     Constitutional:  Positive for fatigue. Negative for fever, chills, decreased appetite, night sweats, recent stressors and confused.   Respiratory:   Negative for cough and shortness of breath.    Skin:  Negative for rash.   Neurological:  Negative for weakness.   Endo/Heme:  Negative for swollen glands.   All other systems reviewed and are negative.              Physical Exam:     Vitals:    12/02/19 1321 12/02/19 1323   BP: (!) 141/89 139/86   BP Location: Right arm   "  Patient Position: Sitting    Cuff Size: Adult Regular    Pulse: 91    Resp: 16    Temp: 98.2  F (36.8  C)    TempSrc: Oral    SpO2: 98%    Weight: 72.1 kg (159 lb)    Height: 1.549 m (5' 1\")      Body mass index is 30.04 kg/m .  Vitals were reviewed and were normal     Physical Exam  Vitals signs and nursing note reviewed.   Constitutional:       General: She is not in acute distress.     Appearance: Normal appearance. She is normal weight. She is not ill-appearing, toxic-appearing or diaphoretic.   HENT:      Head: Normocephalic and atraumatic.      Nose: Nose normal.      Mouth/Throat:      Mouth: Mucous membranes are moist.      Pharynx: Oropharynx is clear. No oropharyngeal exudate or posterior oropharyngeal erythema.   Eyes:      General:         Right eye: No discharge.         Left eye: No discharge.      Conjunctiva/sclera: Conjunctivae normal.   Neck:      Musculoskeletal: Normal range of motion and neck supple. No neck rigidity.   Cardiovascular:      Rate and Rhythm: Normal rate and regular rhythm.      Heart sounds: Normal heart sounds. No murmur. No friction rub. No gallop.    Pulmonary:      Effort: Pulmonary effort is normal. No respiratory distress.      Breath sounds: Normal breath sounds. No stridor. No wheezing, rhonchi or rales.   Chest:      Chest wall: No tenderness.   Lymphadenopathy:      Cervical: No cervical adenopathy.   Skin:     General: Skin is warm and dry.      Findings: Lesion present. No rash. Rash is not crusting, macular, nodular, papular, purpuric, pustular, scaling, urticarial or vesicular.             Comments: 4.5cm x 4.5cm pink area with singular raised area measuring 0.8cm. No lymphadenopathy, no palpable abscess.    Neurological:      Mental Status: She is alert.   Psychiatric:         Mood and Affect: Mood normal.         Thought Content: Thought content normal.         Results:     No labs drawn at this time.     Assessment and Plan     1. Folliculitis  Discussed with " patient signs of cellulitis and return precautions for when to RTC. Will schedule follow up for Wednesday and given dimensions of raised area as well as surrounding pink area. Reviewed side effects of medication, risks and benefits, and proper medication administration. Discussed red flag symptoms and when patient should seek immediate medical attention. Elizabeth Palma verbalized understanding.   - mupirocin (BACTROBAN) 2 % external ointment; Apply topically 3 times daily  Dispense: 1 g; Refill: 0      There are no discontinued medications.    Options for treatment and follow-up care were reviewed with the patient. Elizabeth Palma  engaged in the decision making process and verbalized understanding of the options discussed and agreed with the final plan.    GERHARD Serrano CNP

## 2019-12-02 NOTE — NURSING NOTE
Chief Complaint   Patient presents with     Derm Problem     Pt complains of a rash for the past day.         Kunal Dean, EMT on 12/2/2019 at 1:21 PM

## 2019-12-02 NOTE — PATIENT INSTRUCTIONS
Area measures:     4.5cm x 4.5cm with raised area measuring 0.8cm      Signs to return sooner: fever, swollen lymph nodes, redness spreads outside of marked area, pain getting much worse, notice more bumps please return to clinic sooner.     [facility] Medication Refill Request Information:  * Please contact your pharmacy regarding ANY request for medication refills.  ** [facility] Prescription Fax = [phone]  * Please allow 3 business days for routine medication refills.  * Please allow 5 business days for controlled substance medication refills.     [facility] Test Result notification information:  *You will be notified with in 7-10 days of your appointment day regarding the results of your test.  If you are on MyChart you will be notified as soon as the provider has reviewed the results and signed off on them.    [facility]: [phone]

## 2019-12-05 DIAGNOSIS — N18.5 CHRONIC KIDNEY DISEASE, STAGE V (H): ICD-10-CM

## 2019-12-05 NOTE — TELEPHONE ENCOUNTER
Pt called and states she had nausea yesterday. Also vomited. Today no nausea or vomiting.Eating fine.  Pt requesting to have Ondanestron ODT 4 Mg. tabs on hand at home just in case she feels nausea again.  Umm Vieira RN 9:42 AM on 12/5/2019.

## 2019-12-06 RX ORDER — ONDANSETRON 4 MG/1
4 TABLET, ORALLY DISINTEGRATING ORAL EVERY 6 HOURS PRN
Qty: 20 TABLET | Refills: 3 | Status: SHIPPED | OUTPATIENT
Start: 2019-12-06 | End: 2020-12-11

## 2019-12-17 ENCOUNTER — OFFICE VISIT (OUTPATIENT)
Dept: INTERNAL MEDICINE | Facility: CLINIC | Age: 64
End: 2019-12-17
Payer: MEDICARE

## 2019-12-17 VITALS
HEART RATE: 96 BPM | BODY MASS INDEX: 29.85 KG/M2 | OXYGEN SATURATION: 98 % | WEIGHT: 158 LBS | SYSTOLIC BLOOD PRESSURE: 126 MMHG | DIASTOLIC BLOOD PRESSURE: 76 MMHG

## 2019-12-17 DIAGNOSIS — R25.2 LEG CRAMPS: ICD-10-CM

## 2019-12-17 DIAGNOSIS — E61.1 IRON DEFICIENCY: ICD-10-CM

## 2019-12-17 DIAGNOSIS — D64.9 ANEMIA, UNSPECIFIED TYPE: ICD-10-CM

## 2019-12-17 DIAGNOSIS — R25.2 LEG CRAMPS: Primary | ICD-10-CM

## 2019-12-17 DIAGNOSIS — E11.42 TYPE 2 DIABETES MELLITUS WITH DIABETIC POLYNEUROPATHY, WITHOUT LONG-TERM CURRENT USE OF INSULIN (H): ICD-10-CM

## 2019-12-17 LAB
BASOPHILS # BLD AUTO: 0.1 10E9/L (ref 0–0.2)
BASOPHILS NFR BLD AUTO: 1.2 %
CK SERPL-CCNC: 71 U/L (ref 30–225)
CRP SERPL-MCNC: <2.9 MG/L (ref 0–8)
DIFFERENTIAL METHOD BLD: ABNORMAL
EOSINOPHIL # BLD AUTO: 0.2 10E9/L (ref 0–0.7)
EOSINOPHIL NFR BLD AUTO: 4 %
ERYTHROCYTE [DISTWIDTH] IN BLOOD BY AUTOMATED COUNT: 15.4 % (ref 10–15)
ERYTHROCYTE [SEDIMENTATION RATE] IN BLOOD BY WESTERGREN METHOD: 10 MM/H (ref 0–30)
FERRITIN SERPL-MCNC: 4 NG/ML (ref 8–252)
HBA1C MFR BLD: 7.1 % (ref 0–5.6)
HCT VFR BLD AUTO: 39.9 % (ref 35–47)
HGB BLD-MCNC: 11.8 G/DL (ref 11.7–15.7)
IMM GRANULOCYTES # BLD: 0 10E9/L (ref 0–0.4)
IMM GRANULOCYTES NFR BLD: 0.5 %
IRON SATN MFR SERPL: 6 % (ref 15–46)
IRON SERPL-MCNC: 29 UG/DL (ref 35–180)
LYMPHOCYTES # BLD AUTO: 0.5 10E9/L (ref 0.8–5.3)
LYMPHOCYTES NFR BLD AUTO: 8.1 %
MCH RBC QN AUTO: 23.7 PG (ref 26.5–33)
MCHC RBC AUTO-ENTMCNC: 29.6 G/DL (ref 31.5–36.5)
MCV RBC AUTO: 80 FL (ref 78–100)
MONOCYTES # BLD AUTO: 0.5 10E9/L (ref 0–1.3)
MONOCYTES NFR BLD AUTO: 8.4 %
NEUTROPHILS # BLD AUTO: 4.4 10E9/L (ref 1.6–8.3)
NEUTROPHILS NFR BLD AUTO: 77.8 %
NRBC # BLD AUTO: 0 10*3/UL
NRBC BLD AUTO-RTO: 0 /100
PLATELET # BLD AUTO: 222 10E9/L (ref 150–450)
RBC # BLD AUTO: 4.98 10E12/L (ref 3.8–5.2)
RETICS # AUTO: 70.7 10E9/L (ref 25–95)
RETICS/RBC NFR AUTO: 1.4 % (ref 0.5–2)
TIBC SERPL-MCNC: 440 UG/DL (ref 240–430)
TSH SERPL DL<=0.005 MIU/L-ACNC: 0.82 MU/L (ref 0.4–4)
VIT B12 SERPL-MCNC: 3396 PG/ML (ref 193–986)
WBC # BLD AUTO: 5.7 10E9/L (ref 4–11)

## 2019-12-17 PROCEDURE — 40000611 ZZHCL STATISTIC MORPHOLOGY W/INTERP HEMEPATH TC 85060: Performed by: INTERNAL MEDICINE

## 2019-12-17 ASSESSMENT — PAIN SCALES - GENERAL: PAINLEVEL: NO PAIN (0)

## 2019-12-17 NOTE — PATIENT INSTRUCTIONS
[facility] Medication Refill Request Information:  * Please contact your pharmacy regarding ANY request for medication refills.  ** [facility] Prescription Fax = [phone]  * Please allow 3 business days for routine medication refills.  * Please allow 5 business days for controlled substance medication refills.     [facility] Test Result notification information:  *You will be notified with in 7-10 days of your appointment day regarding the results of your test.  If you are on MyChart you will be notified as soon as the provider has reviewed the results and signed off on them.    [facility]: [phone]

## 2019-12-17 NOTE — PROGRESS NOTES
HPI  64-year-old presents today with a two-month history of nocturnal leg cramps.  She has no symptoms or problems during the day she has no activity restrictions no problems with walking and ambulating and no history of cramps pain red hot swollen joints rash or other extremity symptoms.  However 2 months ago she had insidious onset of cramps that initially started in the ankle and then moved up into the calf.  This occurs 3-4 times a night and at times is painful enough that she needs to get up and walk around for 10 minutes to resolve it.  Otherwise sometimes just standing and moving will resolve the cramps.  She does report a history of restless leg syndrome in the past has been on gabapentin in the past for this but currently is not taking any medication for this.  It is of note that 2 months ago at approximately the time of onset of these symptoms she was started on phentermine.  She is not been aware of any other side effects or ill effects related to the phentermine and she has not lost significant weight to this point.  Otherwise she has been doing well she has had no problems on her present medications.  Past Medical History:   Diagnosis Date     Abnormal MRI, cervical spine 10/15/2011    2011; mild changes noted. Study done for left arm symptoms Impression:  1. Mild multilevel degenerative disc disease with no significant canal or neural stenosis seen. motion artifact on the STIR images in these are not interpretable. The remaining images were interpreted      Autosomal dominant polycystic kidney disease 2011     (Problem list name updated by automated process. Provider to review and confirm.)     CMC DJD(carpometacarpal degenerative joint disease), localized primary 3/5/2013     -donor kidney transplant 3/20/2014     Depressive disorder 11/15/2012     DM type 2 (diabetes mellitus, type 2) (H) 2013     Encounter for long-term (current) use of other medications 2015     Family  history of tremor 10/17/2011     Gastroesophageal reflux disease      Generalized anxiety disorder 11/15/2012     Glaucoma      Hyperlipidemia 10/15/2011     Hyperparathyroidism, secondary (H) 2/25/2015     Hypertension     resolved     Immunosuppressed status (H) 3/20/2014     Major depressive disorder, recurrent episode, moderate (H) 11/15/2012     Obesity (BMI 30-39.9)      OP (osteoporosis) T score -3.8 9/21/2009 2007 T-score -3.7      LION (obstructive sleep apnoea) 10/15/2012    intol to cpa     Pain in joint, forearm -- L unhealed Fx 5/21/2013     Premature menopause age 37 7/10/2012    OCP (vaginal bldg)-->HT which she stopped 2 mo later documented at Jan 12, 2007 visit (age 51).      Restless leg syndrome      Rib fractures 4/13/2013     Sensory loss 10/17/2011    Bottom of feet; uncertain if there is a neuropathy per notes.       Stiffness of joint, not elsewhere classified, hand 3/5/2013     Tremor 10/15/2011    head     Uncomplicated asthma      Past Surgical History:   Procedure Laterality Date     ABDOMEN SURGERY       ANKLE SURGERY       C TRANSPLANTATION OF KIDNEY  3/2014     C/SECTION, LOW TRANSVERSE      x 2     CHOLECYSTECTOMY  1990     COLONOSCOPY       ESOPHAGOSCOPY, GASTROSCOPY, DUODENOSCOPY (EGD), COMBINED N/A 5/19/2015    Procedure: COMBINED ESOPHAGOSCOPY, GASTROSCOPY, DUODENOSCOPY (EGD);  Surgeon: Sky Davey MD;  Location:  GI     ESOPHAGOSCOPY, GASTROSCOPY, DUODENOSCOPY (EGD), COMBINED N/A 5/19/2015    Procedure: COMBINED ESOPHAGOSCOPY, GASTROSCOPY, DUODENOSCOPY (EGD), BIOPSY SINGLE OR MULTIPLE;  Surgeon: Sky Davey MD;  Location:  GI     EYE SURGERY       LAPAROSCOPY, SURGICAL; REPAIR INCISIONAL OR VENTRAL HERNIA       ORTHOPEDIC SURGERY       HERMINIA EN Y BOWEL  1990     WRIST SURGERY       Family History   Problem Relation Age of Onset     Mental Illness Other         family hx     Heart Disease Other      Diabetes Other      Cancer Other      Genetic Disorder  Father      Mental Illness Father      Diabetes Father      Hypertension Father      Hyperlipidemia Mother      Diabetes Mother      Hypertension Mother      Mental Illness Other      Diabetes Other      Glaucoma No family hx of      Macular Degeneration No family hx of      Hypertension No family hx of      Social History     Socioeconomic History     Marital status:      Spouse name: Rahat     Number of children: 2     Years of education: None     Highest education level: None   Occupational History     Comment: part-time   Social Needs     Financial resource strain: None     Food insecurity:     Worry: None     Inability: None     Transportation needs:     Medical: None     Non-medical: None   Tobacco Use     Smoking status: Former Smoker     Smokeless tobacco: Never Used   Substance and Sexual Activity     Alcohol use: No     Alcohol/week: 0.0 standard drinks     Drug use: No     Sexual activity: Yes     Partners: Male     Birth control/protection: None     Comment: 1 partner   Lifestyle     Physical activity:     Days per week: None     Minutes per session: None     Stress: None   Relationships     Social connections:     Talks on phone: None     Gets together: None     Attends Buddhism service: None     Active member of club or organization: None     Attends meetings of clubs or organizations: None     Relationship status: None     Intimate partner violence:     Fear of current or ex partner: None     Emotionally abused: None     Physically abused: None     Forced sexual activity: None   Other Topics Concern     Parent/sibling w/ CABG, MI or angioplasty before 65F 55M? Not Asked   Social History Narrative     None     Complete review of symptoms negative except as noted above.    Exam:  /76 (BP Location: Right arm, Patient Position: Sitting, Cuff Size: Adult Large)   Pulse 96   Wt 71.7 kg (158 lb)   LMP  (LMP Unknown)   SpO2 98%   BMI 29.85 kg/m    158 lbs 0 oz  PHYSICAL EXAMINATION:   The  patient is alert, oriented with a clear sensorium.   Skin shows no lesions or rashes and good turgor.   Head is normocephalic and atraumatic.   Neck shows no nodes, thyromegaly or bruits.   Back is nontender.   Lungs are clear to auscultation.   Heart shows normal S1 and S2 without murmur or gallop.   Abdomen is obese, soft, nontender without masses or organomegaly.   Extremities show no edema and no evidence of active synovitis.   Neurologic examination shows cranial nerves II-XII intact. Motor shows normal strength. Reflexes are full and symmetrical.     Recent labs were reviewed with her showing normal BMP persistent mild anemia with microcytosis  Results for orders placed or performed in visit on 12/17/19   CRP inflammation     Status: None   Result Value Ref Range    CRP Inflammation <2.9 0.0 - 8.0 mg/L   Erythrocyte sedimentation rate auto     Status: None   Result Value Ref Range    Sed Rate 10 0 - 30 mm/h   CK total     Status: None   Result Value Ref Range    CK Total 71 30 - 225 U/L   TSH with free T4 reflex     Status: None   Result Value Ref Range    TSH 0.82 0.40 - 4.00 mU/L   Reticulocyte Count     Status: None   Result Value Ref Range    % Retic 1.4 0.5 - 2.0 %    Absolute Retic 70.7 25 - 95 10e9/L   CBC with platelets differential     Status: Abnormal   Result Value Ref Range    WBC 5.7 4.0 - 11.0 10e9/L    RBC Count 4.98 3.8 - 5.2 10e12/L    Hemoglobin 11.8 11.7 - 15.7 g/dL    Hematocrit 39.9 35.0 - 47.0 %    MCV 80 78 - 100 fl    MCH 23.7 (L) 26.5 - 33.0 pg    MCHC 29.6 (L) 31.5 - 36.5 g/dL    RDW 15.4 (H) 10.0 - 15.0 %    Platelet Count 222 150 - 450 10e9/L    Diff Method Automated Method     % Neutrophils 77.8 %    % Lymphocytes 8.1 %    % Monocytes 8.4 %    % Eosinophils 4.0 %    % Basophils 1.2 %    % Immature Granulocytes 0.5 %    Nucleated RBCs 0 0 /100    Absolute Neutrophil 4.4 1.6 - 8.3 10e9/L    Absolute Lymphocytes 0.5 (L) 0.8 - 5.3 10e9/L    Absolute Monocytes 0.5 0.0 - 1.3 10e9/L     Absolute Eosinophils 0.2 0.0 - 0.7 10e9/L    Absolute Basophils 0.1 0.0 - 0.2 10e9/L    Abs Immature Granulocytes 0.0 0 - 0.4 10e9/L    Absolute Nucleated RBC 0.0    Vitamin B12     Status: Abnormal   Result Value Ref Range    Vitamin B12 3,396 (H) 193 - 986 pg/mL   Iron and iron binding capacity     Status: Abnormal   Result Value Ref Range    Iron 29 (L) 35 - 180 ug/dL    Iron Binding Cap 440 (H) 240 - 430 ug/dL    Iron Saturation Index 6 (L) 15 - 46 %   Ferritin     Status: Abnormal   Result Value Ref Range    Ferritin 4 (L) 8 - 252 ng/mL   Hemoglobin A1c     Status: Abnormal   Result Value Ref Range    Hemoglobin A1C 7.1 (H) 0 - 5.6 %       ASSESSMENT  1 nocturnal leg cramps possible restless leg syndrome aggravated by phentermine  2 anemia with microcytosis and iron deficiency likely contributing to above  3 status post renal transplant  4 hypertension well controlled  5 hyperlipidemia  6 history of depression  7 osteoporosis    Plan  I will have her stop the phentermine for the time being organ to evaluate her anemia check her TSH CK inflammatory markers have her follow-up for reassessment in another 3 weeks off the phentermine.    This note was completed using Dragon voice recognition software.  Although reviewed after completion, some word and grammatical errors may occur.    Yogesh Santacruz MD  General Internal Medicine  [facility]  [phone]

## 2019-12-17 NOTE — NURSING NOTE
Chief Complaint   Patient presents with     Pain     pt here for leg pain at night       Stepan Plaza CMA, EMT at 2:05 PM on 12/17/2019.

## 2019-12-18 LAB — COPATH REPORT: NORMAL

## 2019-12-18 RX ORDER — FERROUS SULFATE 325(65) MG
325 TABLET ORAL
Qty: 100 TABLET | Refills: 3 | Status: SHIPPED | OUTPATIENT
Start: 2019-12-18 | End: 2020-05-05

## 2020-01-02 ENCOUNTER — ALLIED HEALTH/NURSE VISIT (OUTPATIENT)
Dept: SURGERY | Facility: CLINIC | Age: 65
End: 2020-01-02
Payer: MEDICARE

## 2020-01-02 VITALS — BODY MASS INDEX: 30.48 KG/M2 | WEIGHT: 161.3 LBS

## 2020-01-02 DIAGNOSIS — Z94.0 KIDNEY TRANSPLANTED: ICD-10-CM

## 2020-01-02 DIAGNOSIS — E11.42 TYPE 2 DIABETES MELLITUS WITH PERIPHERAL NEUROPATHY (H): ICD-10-CM

## 2020-01-02 DIAGNOSIS — Z71.3 NUTRITIONAL COUNSELING: Primary | ICD-10-CM

## 2020-01-02 DIAGNOSIS — E11.42 TYPE 2 DIABETES MELLITUS WITH DIABETIC POLYNEUROPATHY, WITHOUT LONG-TERM CURRENT USE OF INSULIN (H): ICD-10-CM

## 2020-01-02 DIAGNOSIS — Z48.298 AFTERCARE FOLLOWING ORGAN TRANSPLANT: ICD-10-CM

## 2020-01-02 LAB
ANION GAP SERPL CALCULATED.3IONS-SCNC: 4 MMOL/L (ref 3–14)
BUN SERPL-MCNC: 15 MG/DL (ref 7–30)
CALCIUM SERPL-MCNC: 9.3 MG/DL (ref 8.5–10.1)
CHLORIDE SERPL-SCNC: 113 MMOL/L (ref 94–109)
CHOLEST SERPL-MCNC: 180 MG/DL
CO2 SERPL-SCNC: 24 MMOL/L (ref 20–32)
CREAT SERPL-MCNC: 0.96 MG/DL (ref 0.52–1.04)
CYCLOSPORINE BLD LC/MS/MS-MCNC: 102 UG/L (ref 50–400)
ERYTHROCYTE [DISTWIDTH] IN BLOOD BY AUTOMATED COUNT: 18 % (ref 10–15)
GFR SERPL CREATININE-BSD FRML MDRD: 63 ML/MIN/{1.73_M2}
GLUCOSE SERPL-MCNC: 150 MG/DL (ref 70–99)
HCT VFR BLD AUTO: 39.5 % (ref 35–47)
HDLC SERPL-MCNC: 61 MG/DL
HGB BLD-MCNC: 11.7 G/DL (ref 11.7–15.7)
LDLC SERPL CALC-MCNC: 88 MG/DL
MCH RBC QN AUTO: 24.4 PG (ref 26.5–33)
MCHC RBC AUTO-ENTMCNC: 29.6 G/DL (ref 31.5–36.5)
MCV RBC AUTO: 82 FL (ref 78–100)
NONHDLC SERPL-MCNC: 119 MG/DL
PLATELET # BLD AUTO: 185 10E9/L (ref 150–450)
POTASSIUM SERPL-SCNC: 3.9 MMOL/L (ref 3.4–5.3)
RBC # BLD AUTO: 4.8 10E12/L (ref 3.8–5.2)
SODIUM SERPL-SCNC: 142 MMOL/L (ref 133–144)
TME LAST DOSE: NORMAL H
TRIGL SERPL-MCNC: 154 MG/DL
WBC # BLD AUTO: 4.2 10E9/L (ref 4–11)

## 2020-01-02 PROCEDURE — 80180 DRUG SCRN QUAN MYCOPHENOLATE: CPT | Performed by: INTERNAL MEDICINE

## 2020-01-02 PROCEDURE — 80158 DRUG ASSAY CYCLOSPORINE: CPT | Performed by: INTERNAL MEDICINE

## 2020-01-02 NOTE — PROGRESS NOTES
"Nutrition Reassessment  Reason For Visit:  Elizabeth Palma is a 64 year-old female presenting today for nutrition follow-up, s/p \"gastric bypass in 1990 at [facility]\" per Pt report. She is seeing medical weight management.  She was referred by Dr. Irene.  Note: Pt had a kidney transplant on March 20, 2014.    Pt was accompanied by her  Rahat.     Pt signed Medicare ABN form which is good for 1 year (10/28/19-10/28/20).    Patient with Co-morbidities of obesity including:  Type II DM yes  Renal Failure no  Sleep apnea yes  Hypertension no   Dyslipidemia yes  Joint pain no  Back pain no  GERD yes     Anthropometrics:  Height: 61\"  Pre-op Weight: 265 lbs per Pt report  (Pt reported that she initially was at 215 lbs in 2015)    Current Weight: 161.3 lbs (+1.9 lbs in the past 1 month)    Current Vitamins/Minerals: 3000 International Units vitamin D/day, Calcium 2x daily, vitamin C, vitamin B12 daily, B-complex, iron  *Pt stated that she was told not to take other vitamins/minerals by MD.   3/27/19: Started Naltrexone at most recent MD appointment  4/30/19: patient reported that she is more hungry and is eating larger portions since medication change  5/28/19: taking Topiramate and Naltrexone   11:26: Continues topiramate and Naltrexone. Pt states it took a couple months before she felt a benefit from Naltrexone.   1/2/20: She is having more leg cramps recently at night. Recent labs show she is low in iron. Taking iron per her PCP.    Nutrition History:  - Lactose intolerance ever since bariatric surgery.  - Pt stated that she has osteoporosis; however, she stated that her doctors don't want her to take any vitamins or minerals due to her kidney transplant.  - Pt states she does not like the taste of protein drinks (shakes and clear liquids), yogurt, beans/lentils or any of the high protein products we have in clinic. Pt reports that she does not like meat, and does not eat a lot of it.   - Pt and  follow " "a Kosher diet   - Pt also stated on previous visits that she will not record food intake due to the fact that every time she does this, she simply does not eat as she doesn't want to record.  She stated that her therapist strongly advises against recording food intake for this reason.   - Cannot eat bone broth because it is not kosher.    Physical Activity Note: She reported doing some walking in hallways and in hallways at work, but no structured exercise. At a previous RD visit reported that she has a tendency to break bones so she is limited with what exercises she does.     Recent Diet Recall:  Breakfast:  Piece of toast or skip  Lunch: leftover salad with peanut sauce and couple bites of an egg roll or 1/4 of an impossible burger at [employer]  Dinner: vegetable soup  Snacks: \"A lot less snacking\" 4x/day - eating pea snacks (5-6 servings over 3 days); mini pretzels (4-5 per serving); 1/2 apples; fruit compote, peanuts, 13 g protein popcorn  Beverages: Propel (4 x 16 oz); iced tea (with artifical sweetner); SF rootbeer  Dining Out: 3 meals/week     Progress Towards Previous Goals:  1. Eat 3 meals with lean protein at each meal, along with a non-starchy vegetable or whole fruit, up to 1/2 c carb at a meal.  - Not met. Sometimes skipping meals.  2. If needing a snack, have vegetables or protein snack. Limit 3x/day.    - Not meeting but she has cut down on snacking.  3. Walk 15 min daily, increasing as able. (Starting physical therapy)  - Continues. Finished PT but is still walking the halls at her building.  4. Continue working on Meal planning for all meals. Consider buying a non-fat cottage cheese, and/or eggs instead of bagel/grits/toast for breakfast.    - Not meeting. She's been very tired lately d/t not sleeping well. 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      Nutrition Prescription:   Grams Protein: 60 (minimum) - Not meeting consistently.   Amount of " Fluid: 48-64 oz    Nutrition Diagnosis  Current: Overweight related to history of excessive energy intake, variable eating habits/intake and lack of sufficient exercise as evidenced by pt report and BMI > 25. - Continues    Intervention  Reviewed her progress towards previous goals. Reviewed importance of adequate protein intake on a daily basis. Discussed foods that contain protein and provided examples of how she can incorporate these into her meals in any way possible (ex: hard boiled egg and/or nuts on salad). Encouraged her to choose snacks that either have protein or are a vegetable. Encouraged dark leafy greens, banana, small portion of potato for added potassium in diet given her leg cramps. Encouraged her to continue drinking propel water with electrolytes. Encouraged her to continue walking the halls as able and doing any other PT exercises. Also discussed barriers to meal planning and strategies for getting over these barriers. Also showed pt some of the [facility] meal replacements such as high protein soup and high protein pasta. Pt declined to purchase products today but will consider for future. Provided encouragement and support. Provided pt with list of goals.      Patient Understanding: good  Expected Compliance: good    Goals:   1. Eat 3 meals with lean protein at each meal, along with a non-starchy vegetable or whole fruit, up to 1/2 c carb at a meal.  2. If needing a snack, have vegetables or protein snack. Limit 3x/day.    3. Walk 15 min daily, increasing as able. (Starting physical therapy)  4. Continue working on Meal planning for all meals. Consider buying a non-fat cottage cheese, and/or eggs instead of bagel/grits/toast for breakfast.      Follow-Up: 1 month or PRN     Time spent with patient: 30 minutes  Shannon Lopez MS, RD, LD

## 2020-01-02 NOTE — PATIENT INSTRUCTIONS
Goals:   1. Eat 3 meals with lean protein at each meal, along with a non-starchy vegetable or whole fruit, up to 1/2 c carb at a meal.  2. If needing a snack, have vegetables or protein snack. Limit 3x/day.    3. Walk 15 min daily, increasing as able. (Starting physical therapy)  4. Continue working on Meal planning for all meals. Consider buying a non-fat cottage cheese, and/or eggs instead of bagel/grits/toast for breakfast.      Follow up with RD in one month    Shannon Lopez MS, RD, LD  If you need to schedule or reschedule with a dietitian please call [phone].    Interested in receiving a free bag of shelf stable food over the holidays? Check out this website below to find location pick-up sites at [facility] around the [facility] December 21st-January 5th.  [email or web address]      Interested in working with a health ?  Health coaches work with you to improve your overall health and wellbeing.  They look at the whole person, and may involve discussion of different areas of life, including, but not limited to the four pillars of health (sleep, exercise, nutrition, and stress management). Discuss with your care team if you would like to start working a health .     Health Coaching-3 Pack:      $99 for three health coaching visits    Visits may be done in person or via phone    Coaching is a partnership between the  and the client; Coaches do not prescribe or diagnose    Coaching helps inspire the client to reach his/her personal goals

## 2020-01-03 LAB
MYCOPHENOLATE SERPL LC/MS/MS-MCNC: 2.31 MG/L (ref 1–3.5)
MYCOPHENOLATE-G SERPL LC/MS/MS-MCNC: 73.8 MG/L (ref 30–95)
TME LAST DOSE: NORMAL H

## 2020-01-10 ENCOUNTER — OFFICE VISIT (OUTPATIENT)
Dept: INTERNAL MEDICINE | Facility: CLINIC | Age: 65
End: 2020-01-10
Payer: MEDICARE

## 2020-01-10 VITALS
OXYGEN SATURATION: 96 % | DIASTOLIC BLOOD PRESSURE: 81 MMHG | HEART RATE: 74 BPM | WEIGHT: 161 LBS | BODY MASS INDEX: 30.42 KG/M2 | SYSTOLIC BLOOD PRESSURE: 126 MMHG

## 2020-01-10 DIAGNOSIS — H60.391 INFECTION OF EAR LOBE, RIGHT: ICD-10-CM

## 2020-01-10 DIAGNOSIS — E11.42 TYPE 2 DIABETES MELLITUS WITH DIABETIC POLYNEUROPATHY, WITHOUT LONG-TERM CURRENT USE OF INSULIN (H): ICD-10-CM

## 2020-01-10 DIAGNOSIS — D50.9 IRON DEFICIENCY ANEMIA, UNSPECIFIED IRON DEFICIENCY ANEMIA TYPE: ICD-10-CM

## 2020-01-10 DIAGNOSIS — G47.62 NOCTURNAL LEG CRAMPS: Primary | ICD-10-CM

## 2020-01-10 RX ORDER — HEPARIN SODIUM (PORCINE) LOCK FLUSH IV SOLN 100 UNIT/ML 100 UNIT/ML
5 SOLUTION INTRAVENOUS
Status: CANCELLED | OUTPATIENT
Start: 2020-01-13

## 2020-01-10 RX ORDER — HEPARIN SODIUM,PORCINE 10 UNIT/ML
5 VIAL (ML) INTRAVENOUS
Status: CANCELLED | OUTPATIENT
Start: 2020-01-13

## 2020-01-10 RX ORDER — GABAPENTIN 300 MG/1
300 CAPSULE ORAL AT BEDTIME
Qty: 90 CAPSULE | Refills: 3 | Status: SHIPPED | OUTPATIENT
Start: 2020-01-10 | End: 2020-02-21

## 2020-01-10 ASSESSMENT — PAIN SCALES - GENERAL: PAINLEVEL: NO PAIN (0)

## 2020-01-10 NOTE — PROGRESS NOTES
ASSESSMENT/PLAN:    Nocturnal leg cramps - she was on gabapentin years ago which may have helped in the past.  She stopped the med (phentermine) she thought was the culprit but no change.  She is drinking water.  Her iron is very low which may be contributing.  I have asked her to start gabapentin and get the iron corrected.    Iron deficiency - refractory to treatment and very low levels, also contributing to the acute symptoms of leg cramps.  Start Venofer x 5 doses.    Ear lobe infection - needs Bactroban, has Bactrim chronically which should help it from getting a lot worse    Vitamin B12 is too high - switch dose to every other day    Horacio Sheridan MD, FACP      Chief complaint:  Elizabeth Palma is a 64 year old female presents for   Chief Complaint   Patient presents with     Pain     pt here for constant (every night) leg cramps for 2 months     Infection     pt here for infection in ear lobe from earring     Anemia     pt states low iron        SUBJECTIVE:  She is having bad leg cramps x 3 months.  She is waking up every night.  It started suddenly when taking the phentermine.  She stopped this but they are still there.  She had them when on dialysis (during it) jsut like this.  She will try to walk them off.  Nothing helps except time - some 15-20 seconds and some 5 minutes.  She drinks lots of water.  She was gabapentin when until she started the topiramate - 2-3 years ago.  She was on this for neuropathy but has noticed that this is back with pins and needles in the hands and feet.       She had an infection in her ear lobe from an earring on the left side.  It was healed about 1 week ago.  She put in an earring and there was a pop and bleeding.  She is on Bactrim.  She was putting bactroban on it.       Medications and allergies were reviewed by me today.     Review Of Systems  Constitutional: no fevers  Cardiovascular: no chest pain  Integumentary: no concerning lesions    Patient Active Problem List     Diagnosis Date Noted     Type 2 diabetes mellitus with peripheral neuropathy (H) 2015     Priority: High     -donor kidney transplant 2014     Priority: High     Major depressive disorder, recurrent episode, moderate (H) 11/15/2012     Priority: High     OP (osteoporosis) T score -3.8 2009     Priority: High     2007 T-score -3.7       Median sensory neuropathy, left 2019     Priority: Medium     Nerve conduction study down at [facility] in [place] on 3/7/19 shows: mild left median neuropathy at wrist, left median motor distal latency is normal, the left ulnar motor conduction velocity is abnormally slowed       Personal history of other drug therapy 2018     Priority: Medium     Marital conflict 2018     Priority: Medium     Suicidal ideation 2018     Priority: Medium     Narcissistic personality disorder (H) 2018     Priority: Medium     Age-related osteoporosis without current pathological fracture 08/10/2016     Priority: Medium     Right knee pain 2016     Priority: Medium     Left knee pain 2016     Priority: Medium     Posttraumatic stress disorder 2015     Priority: Medium     Nightmares associated with chronic post-traumatic stress disorder 10/27/2015     Priority: Medium     Pain in joint involving ankle and foot 2015     Priority: Medium     Senile osteoporosis 2015     Priority: Medium     Hyperparathyroidism (H) 2015     Priority: Medium     Problem list name updated by automated process. Provider to review       Hyperparathyroidism, secondary (H) 2015     Priority: Medium     Secondary hyperparathyroidism (H) 2015     Priority: Medium     Immunosuppressed status (H) 2014     Priority: Medium     Pain in joint, forearm -- L unhealed Fx 2013     Priority: Medium     CMC DJD(carpometacarpal degenerative joint disease), localized primary 2013     Priority: Medium      Generalized anxiety disorder 11/15/2012     Priority: Medium     Premature menopause age 37 07/10/2012     Priority: Medium     OCP (vaginal bldg)-->HT which she stopped 2 mo later documented at Jan 12, 2007 visit (age 51).       Sensory loss 10/17/2011     Priority: Medium     Bottom of feet; uncertain if there is a neuropathy per notes.        Hypertension 10/15/2011     Priority: Medium     Hyperlipidemia 10/15/2011     Priority: Medium     Abnormal MRI, cervical spine 10/15/2011     Priority: Medium     4/2011; mild changes noted. Study done for left arm symptoms  Impression:   1. Mild multilevel degenerative disc disease with no significant canal  or neural stenosis seen. motion artifact on the STIR images in these  are not interpretable. The remaining images were interpreted       Autosomal dominant polycystic kidney disease 07/05/2011     Priority: Medium     Overview:   Overview:   (Problem list name updated by automated process. Provider to review and confirm.)         PHYSICAL EXAM:  /81 (BP Location: Right arm, Patient Position: Sitting, Cuff Size: Adult Regular)   Pulse 74   Wt 73 kg (161 lb)   LMP  (LMP Unknown)   SpO2 96%   BMI 30.42 kg/m    Constitutional: no distress, comfortable, pleasant   Musculoskeletal: full range of motion, no edema   Skin: no concerning lesions of the legs

## 2020-01-10 NOTE — NURSING NOTE
Chief Complaint   Patient presents with     Pain     pt here for constant (every night) leg cramps for 2 months       Stepan Plaza CMA, EMT at 2:10 PM on 1/10/2020.

## 2020-01-10 NOTE — PATIENT INSTRUCTIONS
[facility] Medication Refill Request Information:  * Please contact your pharmacy regarding ANY request for medication refills.  ** [facility] Prescription Fax = [phone]  * Please allow 3 business days for routine medication refills.  * Please allow 5 business days for controlled substance medication refills.     [facility] Test Result notification information:  *You will be notified with in 7-10 days of your appointment day regarding the results of your test.  If you are on MyChart you will be notified as soon as the provider has reviewed the results and signed off on them.    [facility] ([facility]): [phone]   s

## 2020-01-10 NOTE — NURSING NOTE
Chief Complaint   Patient presents with     Pain     pt here for constant (every night) leg cramps for 2 months     Infection     pt here for infection in ear lobe from earring     Anemia     pt states low iron       Stepan Plaza, URI, EMT at 2:13 PM on 1/10/2020.

## 2020-01-20 ENCOUNTER — ANCILLARY PROCEDURE (OUTPATIENT)
Dept: MAMMOGRAPHY | Facility: CLINIC | Age: 65
End: 2020-01-20
Attending: INTERNAL MEDICINE
Payer: MEDICARE

## 2020-01-20 DIAGNOSIS — Z12.31 VISIT FOR SCREENING MAMMOGRAM: ICD-10-CM

## 2020-01-27 ENCOUNTER — INFUSION THERAPY VISIT (OUTPATIENT)
Dept: INFUSION THERAPY | Facility: CLINIC | Age: 65
End: 2020-01-27
Attending: INTERNAL MEDICINE
Payer: MEDICARE

## 2020-01-27 VITALS
HEART RATE: 68 BPM | DIASTOLIC BLOOD PRESSURE: 83 MMHG | SYSTOLIC BLOOD PRESSURE: 129 MMHG | TEMPERATURE: 97.6 F | OXYGEN SATURATION: 98 %

## 2020-01-27 DIAGNOSIS — D50.9 IRON DEFICIENCY ANEMIA, UNSPECIFIED IRON DEFICIENCY ANEMIA TYPE: Primary | ICD-10-CM

## 2020-01-27 PROCEDURE — 96365 THER/PROPH/DIAG IV INF INIT: CPT

## 2020-01-27 PROCEDURE — 25800030 ZZH RX IP 258 OP 636: Mod: ZF | Performed by: INTERNAL MEDICINE

## 2020-01-27 PROCEDURE — 40000556 ZZH STATISTIC PERIPHERAL IV START W US GUIDANCE: Mod: ZF

## 2020-01-27 PROCEDURE — 25000128 H RX IP 250 OP 636: Mod: ZF | Performed by: INTERNAL MEDICINE

## 2020-01-27 PROCEDURE — 96366 THER/PROPH/DIAG IV INF ADDON: CPT

## 2020-01-27 RX ORDER — HEPARIN SODIUM,PORCINE 10 UNIT/ML
5 VIAL (ML) INTRAVENOUS
Status: CANCELLED | OUTPATIENT
Start: 2020-01-29

## 2020-01-27 RX ORDER — HEPARIN SODIUM (PORCINE) LOCK FLUSH IV SOLN 100 UNIT/ML 100 UNIT/ML
5 SOLUTION INTRAVENOUS
Status: CANCELLED | OUTPATIENT
Start: 2020-01-29

## 2020-01-27 RX ADMIN — IRON SUCROSE 300 MG: 20 INJECTION, SOLUTION INTRAVENOUS at 13:37

## 2020-01-27 NOTE — PROGRESS NOTES
Nursing Note  Elizabeth Palma presents [facility] for:   Chief Complaint   Patient presents with     Infusion     During today's [facility] appointment, orders from Dr.Brian Sheridan were completed.  Frequency: 300mg x 3 doses, today is dose 1.    Progress note:  Patient identification verified by name and date of birth.  Assessment completed.  Vitals recorded in Doc Flowsheets.  Patient was provided with education regarding medication/procedure and possible side effects.  Patient verbalized understanding.     present during visit today: Not Applicable.    Treatment Conditions: Patient denies fever, chills, signs of infection, recent illness, antibiotics use, productive cough or elevated temperature.    Premedications: were not ordered.    Drug Waste Record: No    Infusion length and rate:  90 minutes    Labs: were not ordered for this appointment.    Vascular access: peripheral IV was placed by vascular access nurse.    Post Infusion Assessment:  Patient tolerated infusion without incident.     Discharge Plan:   Follow up plan of care with: ongoing infusions at [facility].  Discharge instructions were reviewed with patient.  Patient/representative verbalized understanding of discharge instructions and all questions answered.  Patient discharged from [facility] in stable condition.    Nayana Benson RN        /83 (BP Location: Left arm)   Pulse 68   Temp 97.6  F (36.4  C) (Oral)   LMP  (LMP Unknown)   SpO2 98%     Administrations This Visit     iron sucrose (VENOFER) 300 mg in sodium chloride 0.9 % 250 mL intermittent infusion     Admin Date  01/27/2020 Action  New Bag Dose  300 mg Route  Intravenous Administered By  Nayana Benson RN

## 2020-01-27 NOTE — PATIENT INSTRUCTIONS
Dear Elizabeth Palma    Thank you for choosing [facility] for your infusion.  The following information is a summary of our appointment as well as important reminders.    Patient Education     Patient Education    Iron Sucrose Chewable tablet    Iron Sucrose Solution for injection  Iron Sucrose Solution for injection  What is this medicine?  IRON SUCROSE (DEANDRA leals) is an iron complex. Iron is used to make healthy red blood cells, which carry oxygen and nutrients throughout the body. This medicine is used to treat iron deficiency anemia in people with chronic kidney disease.  This medicine may be used for other purposes; ask your health care provider or pharmacist if you have questions.  What should I tell my health care provider before I take this medicine?  They need to know if you have any of these conditions:    anemia not caused by low iron levels    heart disease    high levels of iron in the blood    kidney disease    liver disease    an unusual or allergic reaction to iron, other medicines, foods, dyes, or preservatives    pregnant or trying to get pregnant    breast-feeding  How should I use this medicine?  This medicine is for infusion into a vein. It is given by a health care professional in a hospital or clinic setting.  Talk to your pediatrician regarding the use of this medicine in children. While this drug may be prescribed for children as young as 2 years for selected conditions, precautions do apply.  Overdosage: If you think you have taken too much of this medicine contact a poison control center or emergency room at once.  NOTE: This medicine is only for you. Do not share this medicine with others.  What if I miss a dose?  It is important not to miss your dose. Call your doctor or health care professional if you are unable to keep an appointment.  What may interact with this medicine?  Do not take this medicine with  any of the following medications:    deferoxamine    dimercaprol    other iron products  This medicine may also interact with the following medications:    chloramphenicol    deferasirox  This list may not describe all possible interactions. Give your health care provider a list of all the medicines, herbs, non-prescription drugs, or dietary supplements you use. Also tell them if you smoke, drink alcohol, or use illegal drugs. Some items may interact with your medicine.  What should I watch for while using this medicine?  Visit your doctor or healthcare professional regularly. Tell your doctor or healthcare professional if your symptoms do not start to get better or if they get worse. You may need blood work done while you are taking this medicine.  You may need to follow a special diet. Talk to your doctor. Foods that contain iron include: whole grains/cereals, dried fruits, beans, or peas, leafy green vegetables, and organ meats (liver, kidney).  What side effects may I notice from receiving this medicine?  Side effects that you should report to your doctor or health care professional as soon as possible:    allergic reactions like skin rash, itching or hives, swelling of the face, lips, or tongue    breathing problems    changes in blood pressure    cough    fast, irregular heartbeat    feeling faint or lightheaded, falls    fever or chills    flushing, sweating, or hot feelings    joint or muscle aches/pains    seizures    swelling of the ankles or feet    unusually weak or tired  Side effects that usually do not require medical attention (report to your doctor or health care professional if they continue or are bothersome):    diarrhea    feeling achy    headache    irritation at site where injected    nausea, vomiting    stomach upset    tiredness  This list may not describe all possible side effects. Call your doctor for medical advice about side effects. You may report side effects to FDA at  1-800-FDA-1088.  Where should I keep my medicine?  This drug is given in a hospital or clinic and will not be stored at home.  NOTE:This sheet is a summary. It may not cover all possible information. If you have questions about this medicine, talk to your doctor, pharmacist, or health care provider. Copyright  2016 [employer]           We look forward in seeing you on your next appointment here at [facility] ([facility]).  Please don t hesitate to call us at [phone] to reschedule any of your appointments or to speak with one of the [facility] registered nurses.  It was a pleasure taking care of you today.    Sincerely,    [facility]  [facility]  [address] [place] [address]  [id number]  Phone:  [phone]

## 2020-01-30 ENCOUNTER — TELEPHONE (OUTPATIENT)
Dept: INTERNAL MEDICINE | Facility: CLINIC | Age: 65
End: 2020-01-30

## 2020-01-30 ENCOUNTER — OFFICE VISIT (OUTPATIENT)
Dept: PSYCHIATRY | Facility: CLINIC | Age: 65
End: 2020-01-30
Payer: MEDICARE

## 2020-01-30 VITALS
BODY MASS INDEX: 31.59 KG/M2 | HEART RATE: 86 BPM | DIASTOLIC BLOOD PRESSURE: 80 MMHG | SYSTOLIC BLOOD PRESSURE: 127 MMHG | WEIGHT: 167.2 LBS

## 2020-01-30 DIAGNOSIS — F43.12 NIGHTMARES ASSOCIATED WITH CHRONIC POST-TRAUMATIC STRESS DISORDER: ICD-10-CM

## 2020-01-30 DIAGNOSIS — F51.5 NIGHTMARES ASSOCIATED WITH CHRONIC POST-TRAUMATIC STRESS DISORDER: ICD-10-CM

## 2020-01-30 DIAGNOSIS — F33.1 MAJOR DEPRESSIVE DISORDER, RECURRENT EPISODE, MODERATE (H): ICD-10-CM

## 2020-01-30 RX ORDER — ARIPIPRAZOLE 2 MG/1
2 TABLET ORAL DAILY
Qty: 30 TABLET | Refills: 3 | Status: SHIPPED | OUTPATIENT
Start: 2020-01-30 | End: 2020-03-26

## 2020-01-30 RX ORDER — PRAZOSIN HYDROCHLORIDE 2 MG/1
CAPSULE ORAL
Qty: 30 CAPSULE | Refills: 3 | Status: SHIPPED | OUTPATIENT
Start: 2020-01-30 | End: 2020-03-26

## 2020-01-30 RX ORDER — PRAZOSIN HYDROCHLORIDE 5 MG/1
CAPSULE ORAL
Qty: 90 CAPSULE | Refills: 3 | Status: SHIPPED | OUTPATIENT
Start: 2020-01-30 | End: 2020-03-26

## 2020-01-30 RX ORDER — VILAZODONE HYDROCHLORIDE 40 MG/1
40 TABLET ORAL DAILY
Qty: 30 TABLET | Refills: 3 | Status: SHIPPED | OUTPATIENT
Start: 2020-01-30 | End: 2020-03-26

## 2020-01-30 ASSESSMENT — PAIN SCALES - GENERAL: PAINLEVEL: EXTREME PAIN (9)

## 2020-01-30 ASSESSMENT — PATIENT HEALTH QUESTIONNAIRE - PHQ9: SUM OF ALL RESPONSES TO PHQ QUESTIONS 1-9: 10

## 2020-01-30 NOTE — TELEPHONE ENCOUNTER
[facility] Call Center    Phone Message    May a detailed message be left on voicemail: no    Reason for Call: Other: Pt needs to know if she needs to keep her infusion appt today and on 2/1. Pt was told to have all of these infusions within a week but the Pt has a bad cold and isn't feeling well. Elizabeth would like a call back so she knows whether or not to keep her infusion appts or reschedule, and if so, in what timeframe to provide adequate care. Please call her back.     Action Taken: Message routed to:  [facility] ([facility]): [facility]

## 2020-01-30 NOTE — TELEPHONE ENCOUNTER
Called infusion, they agreed to cancel todays apt, keep feb 1 apt, and they will reschedule the next one.  Called the patient and gave her the direct line to the infusion center. Bronwyn Casiano Paramedic on 1/30/2020 at 10:02 AM
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
## 2020-02-01 ENCOUNTER — TRANSFERRED RECORDS (OUTPATIENT)
Dept: HEALTH INFORMATION MANAGEMENT | Facility: CLINIC | Age: 65
End: 2020-02-01

## 2020-02-01 ENCOUNTER — INFUSION THERAPY VISIT (OUTPATIENT)
Dept: INFUSION THERAPY | Facility: CLINIC | Age: 65
End: 2020-02-01
Attending: INTERNAL MEDICINE
Payer: MEDICARE

## 2020-02-01 VITALS
SYSTOLIC BLOOD PRESSURE: 123 MMHG | TEMPERATURE: 98 F | OXYGEN SATURATION: 95 % | RESPIRATION RATE: 16 BRPM | DIASTOLIC BLOOD PRESSURE: 80 MMHG

## 2020-02-01 DIAGNOSIS — D50.9 IRON DEFICIENCY ANEMIA, UNSPECIFIED IRON DEFICIENCY ANEMIA TYPE: Primary | ICD-10-CM

## 2020-02-01 PROCEDURE — 25800030 ZZH RX IP 258 OP 636: Mod: ZF | Performed by: INTERNAL MEDICINE

## 2020-02-01 PROCEDURE — 96365 THER/PROPH/DIAG IV INF INIT: CPT

## 2020-02-01 PROCEDURE — 96366 THER/PROPH/DIAG IV INF ADDON: CPT

## 2020-02-01 PROCEDURE — 25000128 H RX IP 250 OP 636: Mod: ZF | Performed by: INTERNAL MEDICINE

## 2020-02-01 RX ORDER — HEPARIN SODIUM,PORCINE 10 UNIT/ML
5 VIAL (ML) INTRAVENOUS
Status: CANCELLED | OUTPATIENT
Start: 2020-02-02

## 2020-02-01 RX ORDER — HEPARIN SODIUM (PORCINE) LOCK FLUSH IV SOLN 100 UNIT/ML 100 UNIT/ML
5 SOLUTION INTRAVENOUS
Status: CANCELLED | OUTPATIENT
Start: 2020-02-02

## 2020-02-01 RX ADMIN — IRON SUCROSE 300 MG: 20 INJECTION, SOLUTION INTRAVENOUS at 09:27

## 2020-02-01 NOTE — PROGRESS NOTES
Nursing Note  Elizabeth Palma presents [facility] for:   Chief Complaint   Patient presents with     Infusion     IV Venofer     During today's [facility] appointment, orders from Dr DEMARCUS Sheridan were completed.  Frequency: today is dose 2 of 3 total    Progress note:  Patient identification verified by name and date of birth.  Assessment completed.  Vitals recorded in Doc Flowsheets.  Patient was provided with education regarding medication/procedure and possible side effects.  Patient verbalized understanding.     present during visit today: Not Applicable.    Treatment Conditions: Non-applicable.    Premedications: were not ordered.    Drug Waste Record: No    Infusion length and rate:  infusion given over approximately 90 minutes    Labs: were not ordered for this appointment.    Vascular access: peripheral IV placed today.    Post Infusion Assessment:  Patient tolerated infusion without incident.  Blood return noted pre and post infusion.  Site patent and intact, free from redness, edema or discomfort.  No evidence of extravasations.  Access discontinued per protocol.     Discharge Plan:   Follow up plan of care with: ongoing infusions at [facility].  Discharge instructions were reviewed with patient.  Patient/representative verbalized understanding of discharge instructions and all questions answered.  Patient discharged from [facility] in stable condition.    Feliz Blanco RN    Administrations This Visit     iron sucrose (VENOFER) 300 mg in sodium chloride 0.9 % 250 mL intermittent infusion     Admin Date  02/01/2020 Action  New Bag Dose  300 mg Rate  193.3 mL/hr Route  Intravenous Administered By  Feliz Blanco RN                /77 (BP Location: Left arm)   Temp 98  F (36.7  C) (Oral)   Resp 16   LMP  (LMP Unknown)   SpO2 95%

## 2020-02-03 ENCOUNTER — OFFICE VISIT (OUTPATIENT)
Dept: INTERNAL MEDICINE | Facility: CLINIC | Age: 65
End: 2020-02-03
Payer: MEDICARE

## 2020-02-03 ENCOUNTER — PRE VISIT (OUTPATIENT)
Dept: INTERNAL MEDICINE | Facility: CLINIC | Age: 65
End: 2020-02-03

## 2020-02-03 ENCOUNTER — OFFICE VISIT (OUTPATIENT)
Dept: PODIATRY | Facility: CLINIC | Age: 65
End: 2020-02-03
Payer: MEDICARE

## 2020-02-03 ENCOUNTER — INFUSION THERAPY VISIT (OUTPATIENT)
Dept: INFUSION THERAPY | Facility: CLINIC | Age: 65
End: 2020-02-03
Attending: INTERNAL MEDICINE
Payer: MEDICARE

## 2020-02-03 VITALS
WEIGHT: 167.5 LBS | OXYGEN SATURATION: 94 % | BODY MASS INDEX: 31.65 KG/M2 | SYSTOLIC BLOOD PRESSURE: 123 MMHG | DIASTOLIC BLOOD PRESSURE: 79 MMHG | HEART RATE: 68 BPM

## 2020-02-03 VITALS
HEART RATE: 86 BPM | BODY MASS INDEX: 31.55 KG/M2 | OXYGEN SATURATION: 94 % | SYSTOLIC BLOOD PRESSURE: 127 MMHG | WEIGHT: 167 LBS | DIASTOLIC BLOOD PRESSURE: 80 MMHG

## 2020-02-03 VITALS
DIASTOLIC BLOOD PRESSURE: 84 MMHG | HEART RATE: 68 BPM | SYSTOLIC BLOOD PRESSURE: 131 MMHG | TEMPERATURE: 98.2 F | OXYGEN SATURATION: 98 %

## 2020-02-03 DIAGNOSIS — D50.9 IRON DEFICIENCY ANEMIA, UNSPECIFIED IRON DEFICIENCY ANEMIA TYPE: Primary | ICD-10-CM

## 2020-02-03 DIAGNOSIS — Z94.0 KIDNEY TRANSPLANTED: ICD-10-CM

## 2020-02-03 DIAGNOSIS — E11.49 TYPE II OR UNSPECIFIED TYPE DIABETES MELLITUS WITH NEUROLOGICAL MANIFESTATIONS, NOT STATED AS UNCONTROLLED(250.60) (H): ICD-10-CM

## 2020-02-03 DIAGNOSIS — I10 BENIGN ESSENTIAL HYPERTENSION: ICD-10-CM

## 2020-02-03 DIAGNOSIS — I10 BENIGN ESSENTIAL HYPERTENSION: Primary | ICD-10-CM

## 2020-02-03 DIAGNOSIS — Z48.298 AFTERCARE FOLLOWING ORGAN TRANSPLANT: ICD-10-CM

## 2020-02-03 DIAGNOSIS — L60.2 ONYCHAUXIS: Primary | ICD-10-CM

## 2020-02-03 LAB
ALBUMIN SERPL-MCNC: 3.3 G/DL (ref 3.4–5)
ALP SERPL-CCNC: 65 U/L (ref 40–150)
ALT SERPL W P-5'-P-CCNC: 22 U/L (ref 0–50)
ANION GAP SERPL CALCULATED.3IONS-SCNC: 7 MMOL/L (ref 3–14)
AST SERPL W P-5'-P-CCNC: 10 U/L (ref 0–45)
BASOPHILS # BLD AUTO: 0.1 10E9/L (ref 0–0.2)
BASOPHILS NFR BLD AUTO: 1.1 %
BILIRUB SERPL-MCNC: 0.3 MG/DL (ref 0.2–1.3)
BUN SERPL-MCNC: 16 MG/DL (ref 7–30)
CALCIUM SERPL-MCNC: 9.1 MG/DL (ref 8.5–10.1)
CHLORIDE SERPL-SCNC: 109 MMOL/L (ref 94–109)
CO2 SERPL-SCNC: 23 MMOL/L (ref 20–32)
CREAT SERPL-MCNC: 0.9 MG/DL (ref 0.52–1.04)
CREAT UR-MCNC: 36 MG/DL
DIFFERENTIAL METHOD BLD: ABNORMAL
EOSINOPHIL # BLD AUTO: 0.1 10E9/L (ref 0–0.7)
EOSINOPHIL NFR BLD AUTO: 2.7 %
ERYTHROCYTE [DISTWIDTH] IN BLOOD BY AUTOMATED COUNT: 18.6 % (ref 10–15)
GFR SERPL CREATININE-BSD FRML MDRD: 68 ML/MIN/{1.73_M2}
GLUCOSE SERPL-MCNC: 249 MG/DL (ref 70–99)
HCT VFR BLD AUTO: 40.7 % (ref 35–47)
HGB BLD-MCNC: 12.1 G/DL (ref 11.7–15.7)
IMM GRANULOCYTES # BLD: 0.1 10E9/L (ref 0–0.4)
IMM GRANULOCYTES NFR BLD: 1.5 %
LYMPHOCYTES # BLD AUTO: 0.3 10E9/L (ref 0.8–5.3)
LYMPHOCYTES NFR BLD AUTO: 6.5 %
MCH RBC QN AUTO: 25.9 PG (ref 26.5–33)
MCHC RBC AUTO-ENTMCNC: 29.7 G/DL (ref 31.5–36.5)
MCV RBC AUTO: 87 FL (ref 78–100)
MONOCYTES # BLD AUTO: 0.3 10E9/L (ref 0–1.3)
MONOCYTES NFR BLD AUTO: 6.3 %
NEUTROPHILS # BLD AUTO: 4.3 10E9/L (ref 1.6–8.3)
NEUTROPHILS NFR BLD AUTO: 81.9 %
NRBC # BLD AUTO: 0 10*3/UL
NRBC BLD AUTO-RTO: 0 /100
PLATELET # BLD AUTO: 210 10E9/L (ref 150–450)
POTASSIUM SERPL-SCNC: 4.5 MMOL/L (ref 3.4–5.3)
PROT SERPL-MCNC: 6.4 G/DL (ref 6.8–8.8)
PROT UR-MCNC: 0.06 G/L
PROT/CREAT 24H UR: 0.17 G/G CR (ref 0–0.2)
RBC # BLD AUTO: 4.68 10E12/L (ref 3.8–5.2)
SODIUM SERPL-SCNC: 139 MMOL/L (ref 133–144)
WBC # BLD AUTO: 5.2 10E9/L (ref 4–11)

## 2020-02-03 PROCEDURE — 25000128 H RX IP 250 OP 636: Mod: ZF | Performed by: INTERNAL MEDICINE

## 2020-02-03 PROCEDURE — 99213 OFFICE O/P EST LOW 20 MIN: CPT | Performed by: PODIATRIST

## 2020-02-03 PROCEDURE — 84156 ASSAY OF PROTEIN URINE: CPT

## 2020-02-03 PROCEDURE — 40000556 ZZH STATISTIC PERIPHERAL IV START W US GUIDANCE: Mod: ZF

## 2020-02-03 PROCEDURE — 96365 THER/PROPH/DIAG IV INF INIT: CPT

## 2020-02-03 PROCEDURE — 96366 THER/PROPH/DIAG IV INF ADDON: CPT

## 2020-02-03 PROCEDURE — 25800030 ZZH RX IP 258 OP 636: Mod: ZF | Performed by: INTERNAL MEDICINE

## 2020-02-03 RX ORDER — HEPARIN SODIUM (PORCINE) LOCK FLUSH IV SOLN 100 UNIT/ML 100 UNIT/ML
5 SOLUTION INTRAVENOUS
Status: CANCELLED | OUTPATIENT
Start: 2020-02-03

## 2020-02-03 RX ORDER — HEPARIN SODIUM,PORCINE 10 UNIT/ML
5 VIAL (ML) INTRAVENOUS
Status: CANCELLED | OUTPATIENT
Start: 2020-02-03

## 2020-02-03 RX ADMIN — IRON SUCROSE 300 MG: 20 INJECTION, SOLUTION INTRAVENOUS at 15:01

## 2020-02-03 ASSESSMENT — ENCOUNTER SYMPTOMS
NECK MASS: 0
TROUBLE SWALLOWING: 0
TASTE DISTURBANCE: 0
PANIC: 0
DEPRESSION: 1
SINUS CONGESTION: 0
NERVOUS/ANXIOUS: 1
HOARSE VOICE: 1
SINUS PAIN: 0
SMELL DISTURBANCE: 0
INSOMNIA: 0
DECREASED CONCENTRATION: 0
SORE THROAT: 1

## 2020-02-03 ASSESSMENT — PAIN SCALES - GENERAL: PAINLEVEL: EXTREME PAIN (8)

## 2020-02-03 NOTE — PROGRESS NOTES
Nursing Note  Elizabeth Palma presents today to [facility] for:   Chief Complaint   Patient presents with     Infusion     Venofer     During [facility]'s [facility] appointment, orders from Dr. Sheridan were completed.  Frequency: today is dose 3 of 3 total    Progress note:  Patient identification verified by name and date of birth.  Assessment completed.  Vitals recorded in Doc Flowsheets.  Patient was provided with education regarding medication/procedure and possible side effects.  Patient verbalized understanding.     present during visit today: Not Applicable.    Treatment Conditions: Non-applicable.    Premedications: were not ordered.    Drug Waste Record: No    Infusion length and rate:  infusion given over approximately 90 minutes    Labs: drawn today, prior to appointment in lab.    Vascular access: peripheral IV placed today.    Post Infusion Assessment:  Patient tolerated infusion without incident.     Discharge Plan:   Follow up plan of care with: ongoing infusions at [facility].  Discharge instructions were reviewed with patient.  Patient/representative verbalized understanding of discharge instructions and all questions answered.  Patient discharged from [facility] in stable condition.    Chasidy Del Real RN       Administrations This Visit     iron sucrose (VENOFER) 300 mg in sodium chloride 0.9 % 250 mL intermittent infusion     Admin Date  02/03/2020 Action  New Bag Dose  300 mg Rate  193.3 mL/hr Route  Intravenous Administered By  Chasidy Del Real RN              /74   Pulse 68   Temp 98.2  F (36.8  C) (Oral)   LMP  (LMP Unknown)   SpO2 98%

## 2020-02-03 NOTE — PATIENT INSTRUCTIONS
Thanks for coming today.  [facility]  [address] [place] [address]    To schedule future appointments in Orth[facility] Clinic, you may call [phone].    To schedule ordered imaging by your provider:   Call [facility] [phone]    To schedule an injection ordered by your provider:  Call [facility] Injection scheduling line: [phone]  [email or web address] available online at:  [email or web address]    Please call if any further questions or concerns ([phone]).  Clinic hours 8 am to 5 pm.    Return to clinic (call) if symptoms worsen or fail to improve.

## 2020-02-03 NOTE — PROGRESS NOTES
Past Medical History:   Diagnosis Date     Abnormal MRI, cervical spine 10/15/2011    2011; mild changes noted. Study done for left arm symptoms Impression:  1. Mild multilevel degenerative disc disease with no significant canal or neural stenosis seen. motion artifact on the STIR images in these are not interpretable. The remaining images were interpreted      Autosomal dominant polycystic kidney disease 2011     (Problem list name updated by automated process. Provider to review and confirm.)     CMC DJD(carpometacarpal degenerative joint disease), localized primary 3/5/2013     -donor kidney transplant 3/20/2014     Depressive disorder 11/15/2012     DM type 2 (diabetes mellitus, type 2) (H) 2013     Encounter for long-term (current) use of other medications 2015     Family history of tremor 10/17/2011     Gastroesophageal reflux disease      Generalized anxiety disorder 11/15/2012     Glaucoma      Hyperlipidemia 10/15/2011     Hyperparathyroidism, secondary (H) 2015     Hypertension     resolved     Immunosuppressed status (H) 3/20/2014     Major depressive disorder, recurrent episode, moderate (H) 11/15/2012     Obesity (BMI 30-39.9)      OP (osteoporosis) T score -3.8 2009 T-score -3.7      LION (obstructive sleep apnoea) 10/15/2012    intol to cpa     Pain in joint, forearm -- L unhealed Fx 2013     Premature menopause age 37 7/10/2012    OCP (vaginal bldg)-->HT which she stopped 2 mo later documented at 2007 visit (age 51).      Restless leg syndrome      Rib fractures 2013     Sensory loss 10/17/2011    Bottom of feet; uncertain if there is a neuropathy per notes.       Stiffness of joint, not elsewhere classified, hand 3/5/2013     Tremor 10/15/2011    head     Uncomplicated asthma      Patient Active Problem List   Diagnosis     Hypertension     Hyperlipidemia     Abnormal MRI, cervical spine     Sensory loss     Premature menopause age 37      OP (osteoporosis) T score -3.8     Major depressive disorder, recurrent episode, moderate (H)     Generalized anxiety disorder     CMC DJD(carpometacarpal degenerative joint disease), localized primary     Pain in joint, forearm -- L unhealed Fx     -donor kidney transplant     Immunosuppressed status (H)     Hyperparathyroidism, secondary (H)     Hyperparathyroidism (H)     Senile osteoporosis     Pain in joint involving ankle and foot     Nightmares associated with chronic post-traumatic stress disorder     Type 2 diabetes mellitus with peripheral neuropathy (H)     Posttraumatic stress disorder     Right knee pain     Left knee pain     Age-related osteoporosis without current pathological fracture     Narcissistic personality disorder (H)     Personal history of other drug therapy     Median sensory neuropathy, left     Marital conflict     Suicidal ideation     Autosomal dominant polycystic kidney disease     Secondary hyperparathyroidism (H)     Anemia, iron deficiency     Past Surgical History:   Procedure Laterality Date     ABDOMEN SURGERY       ANKLE SURGERY       C TRANSPLANTATION OF KIDNEY  3/2014     C/SECTION, LOW TRANSVERSE      x 2     CHOLECYSTECTOMY       COLONOSCOPY       ESOPHAGOSCOPY, GASTROSCOPY, DUODENOSCOPY (EGD), COMBINED N/A 2015    Procedure: COMBINED ESOPHAGOSCOPY, GASTROSCOPY, DUODENOSCOPY (EGD);  Surgeon: Sky Davey MD;  Location:  GI     ESOPHAGOSCOPY, GASTROSCOPY, DUODENOSCOPY (EGD), COMBINED N/A 2015    Procedure: COMBINED ESOPHAGOSCOPY, GASTROSCOPY, DUODENOSCOPY (EGD), BIOPSY SINGLE OR MULTIPLE;  Surgeon: Sky Davey MD;  Location:  GI     EYE SURGERY       LAPAROSCOPY, SURGICAL; REPAIR INCISIONAL OR VENTRAL HERNIA       ORTHOPEDIC SURGERY       HERMINIA EN Y BOWEL       WRIST SURGERY       Social History     Socioeconomic History     Marital status:      Spouse name: Rahat     Number of children: 2     Years of education: Not on  file     Highest education level: Not on file   Occupational History     Comment: part-time   Social Needs     Financial resource strain: Not on file     Food insecurity:     Worry: Not on file     Inability: Not on file     Transportation needs:     Medical: Not on file     Non-medical: Not on file   Tobacco Use     Smoking status: Former Smoker     Smokeless tobacco: Never Used   Substance and Sexual Activity     Alcohol use: No     Alcohol/week: 0.0 standard drinks     Drug use: No     Sexual activity: Yes     Partners: Male     Birth control/protection: None     Comment: 1 partner   Lifestyle     Physical activity:     Days per week: Not on file     Minutes per session: Not on file     Stress: Not on file   Relationships     Social connections:     Talks on phone: Not on file     Gets together: Not on file     Attends [employer] service: Not on file     Active member of club or organization: Not on file     Attends meetings of clubs or organizations: Not on file     Relationship status: Not on file     Intimate partner violence:     Fear of current or ex partner: Not on file     Emotionally abused: Not on file     Physically abused: Not on file     Forced sexual activity: Not on file   Other Topics Concern     Parent/sibling w/ CABG, MI or angioplasty before 65F 55M? Not Asked   Social History Narrative     Not on file     Family History   Problem Relation Age of Onset     Mental Illness Other         family hx     Heart Disease Other      Diabetes Other      Cancer Other      Genetic Disorder Father      Mental Illness Father      Diabetes Father      Hypertension Father      Hyperlipidemia Mother      Diabetes Mother      Hypertension Mother      Mental Illness Other      Diabetes Other      Glaucoma No family hx of      Macular Degeneration No family hx of      Hypertension No family hx of      Lab Results   Component Value Date    A1C 7.1 12/17/2019    A1C 7.2 04/24/2019    A1C 7.4 11/27/2018    A1C 6.4  01/19/2018    A1C 6.5 05/19/2017     SUBJECTIVE FINDINGS:  This 64-year-old female returns to clinic for diabetic foot check and onychauxis, onychocryptosis. She relates she is doing well. Relates no ulcers or sores since we have seen her last, no injuries. She relates she is wearing diabetic shoes. She relates she does have some tingling in her feet. It has gotten better with taking gabapentin. She relates otherwise is doing well.       OBJECTIVE FINDINGS:  DP and PT are 2/4 bilaterally. She has decreased ankle joint dorsiflexion bilaterally. She has dorsally contracted digits bilaterally 1-5. She has flexible flat foot with dorsal medial first MPJ prominence. Deep tendon reflexes are intact bilaterally. Sharp/dull is decreased with 5.07 [place] monofilament on the right first MPJ and the left fifth MPJ I is decreased, otherwise it is intact bilaterally. There is no erythema, no drainage, no odor, no calor bilaterally. She has incurvated nails 1-5 bilaterally to differing degrees.       ASSESSMENT AND PLAN: Onychauxis bilaterally. She is diabetic with peripheral neuropathy. Diagnosis and treatment discussed with her. All the nails were reduced bilaterally upon consent. She will return to clinic to see me in about 3 months.

## 2020-02-03 NOTE — PROGRESS NOTES
Elizabeth Palma is 64 year old female here at the request of Dr. Fuentes for cardiovascular, pulmonary, and perioperative risk assessment prior to surgery.The intended surgical procedure is right ulnar and carpal tunnel release. A copy of this note will be sent to the surgeon.    This is a LOW risk surgery.      HPI:   Reason for surgery:  Has been experiencing bilateral tingling for years that shoots up the forearm to the elbow. Never knows when it will hit her. Feels like she is being shocked. Has had multiple surgical releases which have helped with the pain, but the neuropathic pain keeps returning. Hasn't had any complications with procedures in past. Last procedure ~5 years ago.      Cardiovascular Risk:  This patient ambulates without assist. without chest pain. She IS able to climb a flight of stairs without chest pain.    The patient does not have chest pain rest or exercise.    Shedoes not have a history of known cardiac disease, prior MI, smoker and arrhythmia. Does have history of diabetes, HTN.  The patient does not have a history of stroke, and does not have a history of valvular disease.    Pulmonary Risk:  In terms of risk factors for pulmonary complications, the patient does have a history of exercise induced Asthma but does not use inhaler regularly    Perioperative Complications:  The patient does not have a history of bleeding or clotting problems in the past. The patient has not had complications from past surgeries including prior kidney transplant, carpal tunnel release, and multiple other procedures.  The patient does have a family history of any anesthesia or surgical complications. She notes her sister  from general anesthesia during a valve repair surgery in 2007.    ROS:  Constitutional: no fevers, night sweats or unintentional weight change   Eyes: no vision change, diplopia or red eyes   Ears, Nose, Mouth, Throat: no tinnitus or hearing change, no epistaxis or nasal discharge,  no oral lesions, throat clear   Cardiovascular: no chest pain, palpitations, or pain with walking, no orthopnea or PND   Respiratory: no dyspnea, cough, shortness of breath or wheezing   GI: no nausea, vomiting, diarrhea or constipation, no abdominal pain   : no change in urine, no dysuria or hematuria  Musculoskeletal: no joint or muscle pain or swelling   Integumentary: no concerning lesions or moles   Neuro: no loss of strength or sensation, no numbness or tingling, no tremor, no dizziness, no headache   Endo: no polyuria or polydipsia, no temperature intolerance   Heme/Lymph: no concerning bumps, no bleeding problems   Allergy: no environmental allergies   Psych: stable      Past Medical History:   Diagnosis Date     Abnormal MRI, cervical spine 10/15/2011    2011; mild changes noted. Study done for left arm symptoms Impression:  1. Mild multilevel degenerative disc disease with no significant canal or neural stenosis seen. motion artifact on the STIR images in these are not interpretable. The remaining images were interpreted      Autosomal dominant polycystic kidney disease 2011     (Problem list name updated by automated process. Provider to review and confirm.)     CMC DJD(carpometacarpal degenerative joint disease), localized primary 3/5/2013     -donor kidney transplant 3/20/2014     Depressive disorder 11/15/2012     DM type 2 (diabetes mellitus, type 2) (H) 2013     Encounter for long-term (current) use of other medications 2015     Family history of tremor 10/17/2011     Gastroesophageal reflux disease      Generalized anxiety disorder 11/15/2012     Glaucoma      Hyperlipidemia 10/15/2011     Hyperparathyroidism, secondary (H) 2015     Hypertension     resolved     Immunosuppressed status (H) 3/20/2014     Major depressive disorder, recurrent episode, moderate (H) 11/15/2012     Obesity (BMI 30-39.9)      OP (osteoporosis) T score -3.8 2009    2007 T-score -3.7       LION (obstructive sleep apnoea) 10/15/2012    intol to cpa     Pain in joint, forearm -- L unhealed Fx 5/21/2013     Premature menopause age 37 7/10/2012    OCP (vaginal bldg)-->HT which she stopped 2 mo later documented at Jan 12, 2007 visit (age 51).      Restless leg syndrome      Rib fractures 4/13/2013     Sensory loss 10/17/2011    Bottom of feet; uncertain if there is a neuropathy per notes.       Stiffness of joint, not elsewhere classified, hand 3/5/2013     Tremor 10/15/2011    head     Uncomplicated asthma       PAST SURGICAL HISTORY  Current Outpatient Medications   Medication     acetaminophen (TYLENOL) 325 MG tablet     acetylcysteine (N-ACETYL-L-CYSTEINE) 600 MG CAPS capsule     albuterol (PROAIR HFA/PROVENTIL HFA/VENTOLIN HFA) 108 (90 Base) MCG/ACT inhaler     ARIPiprazole (ABILIFY) 2 MG tablet     aspirin EC 81 MG EC tablet     blood glucose monitoring (ACCU-CHEK RALPH PLUS) meter device kit     blood glucose monitoring (NO BRAND SPECIFIED) meter device kit     blood glucose monitoring (NO BRAND SPECIFIED) test strip     blood glucose monitoring (SOFTCLIX) lancets     Cholecalciferol (VITAMIN D) 1000 UNITS capsule     cyanocolbalamin (VITAMIN  B-12) 1000 MCG tablet     cycloSPORINE modified (GENERIC EQUIVALENT) 25 MG capsule     cycloSPORINE modified (GENERIC EQUIVALENT) 25 MG capsule     econazole nitrate 1 % cream     estradiol (VAGIFEM) 10 MCG TABS vaginal tablet     ferrous sulfate (FEROSUL) 325 (65 Fe) MG tablet     fluticasone (FLONASE) 50 MCG/ACT nasal spray     furosemide (LASIX) 20 MG tablet     gabapentin (NEURONTIN) 300 MG capsule     latanoprost (XALATAN) 0.005 % ophthalmic solution     metFORMIN (GLUCOPHAGE-XR) 500 MG 24 hr tablet     mupirocin (BACTROBAN) 2 % external ointment     mycophenolate (GENERIC EQUIVALENT) 250 MG capsule     omeprazole (PRILOSEC) 40 MG DR capsule     ondansetron (ZOFRAN-ODT) 4 MG ODT tab     order for DME     order for DME     Polyvinyl Alcohol-Povidone  (REFRESH OP)     prazosin (MINIPRESS) 2 MG capsule     prazosin (MINIPRESS) 5 MG capsule     simvastatin (ZOCOR) 20 MG tablet     sulfamethoxazole-trimethoprim (BACTRIM/SEPTRA) 400-80 MG tablet     topiramate (TOPAMAX) 200 MG tablet     Vaginal Lubricant (REPLENS) GEL     vilazodone (VIIBRYD) 40 MG TABS tablet     No current facility-administered medications for this visit.      Immunization History   Administered Date(s) Administered     FLU 6-35 months 10/04/2015, 09/24/2016     Hep B, Peds or Adolescent 02/04/2010     HepB 02/04/2010, 03/17/2010, 08/09/2010     HepB, Unspecified 03/17/2010, 08/09/2010     Influenza (H1N1) 11/01/2009     Influenza (IIV3) PF 11/05/1999, 09/01/2008, 10/01/2009, 10/26/2011, 09/15/2012, 10/01/2013, 10/01/2014, 10/01/2015     Influenza Vaccine IM > 6 months Valent IIV4 09/25/2016, 09/29/2017, 10/01/2018, 10/03/2019     Influenza Vaccine, 6+MO IM (QUADRIVALENT W/PRESERVATIVES) 09/29/2017     Mantoux Tuberculin Skin Test 02/15/2010     Pneumococcal 23 valent 11/25/2008     TDAP Vaccine (Adacel) 01/01/2004, 10/12/2012     TDAP Vaccine (Boostrix) 10/12/2012     Tetanus 04/05/2005     Zoster vaccine recombinant adjuvanted (SHINGRIX) 10/18/2019       PHYSICAL EXAM:  LMP  (LMP Unknown)     Wt Readings from Last 1 Encounters:   02/03/20 76 kg (167 lb 8 oz)       Constitutional: no distress, comfortable, pleasant   Eyes: anicteric, normal extra-ocular movements   Ears, Nose and Throat: tympanic membranes clear, nose clear and free of lesions, throat clear, neck supple with full range of motion, no thyromegaly.   Cardiovascular: regular rate and rhythm, normal S1 and S2, no murmurs, rubs or gallops, peripheral pulses full and symmetric   Respiratory: CTAB, no wheezes or crackles, normal breath sounds   Gastrointestinal: positive bowel sounds, nontender, no hepatosplenomegaly, no masses   Musculoskeletal: full range of motion, no edema   Skin: no concerning lesions, no jaundice   Neurological:  positive Tinel's sign bilaterally with decreased strength in fingers bilaterally, worse in right hand than left. Sensation to light touch grossly intact BUE.  Psychological: appropriate mood   Lymphatic: no cervical, axillary or inguinal lymphadenopathy      A/P:    The patient with   Past Medical History:   Diagnosis Date     Abnormal MRI, cervical spine 10/15/2011    2011; mild changes noted. Study done for left arm symptoms Impression:  1. Mild multilevel degenerative disc disease with no significant canal or neural stenosis seen. motion artifact on the STIR images in these are not interpretable. The remaining images were interpreted      Autosomal dominant polycystic kidney disease 2011     (Problem list name updated by automated process. Provider to review and confirm.)     CMC DJD(carpometacarpal degenerative joint disease), localized primary 3/5/2013     -donor kidney transplant 3/20/2014     Depressive disorder 11/15/2012     DM type 2 (diabetes mellitus, type 2) (H) 2013     Encounter for long-term (current) use of other medications 2015     Family history of tremor 10/17/2011     Gastroesophageal reflux disease      Generalized anxiety disorder 11/15/2012     Glaucoma      Hyperlipidemia 10/15/2011     Hyperparathyroidism, secondary (H) 2015     Hypertension     resolved     Immunosuppressed status (H) 3/20/2014     Major depressive disorder, recurrent episode, moderate (H) 11/15/2012     Obesity (BMI 30-39.9)      OP (osteoporosis) T score -3.8 2009 T-score -3.7      LION (obstructive sleep apnoea) 10/15/2012    intol to cpa     Pain in joint, forearm -- L unhealed Fx 2013     Premature menopause age 37 7/10/2012    OCP (vaginal bldg)-->HT which she stopped 2 mo later documented at 2007 visit (age 51).      Restless leg syndrome      Rib fractures 2013     Sensory loss 10/17/2011    Bottom of feet; uncertain if there is a neuropathy per notes.        Stiffness of joint, not elsewhere classified, hand 3/5/2013     Tremor 10/15/2011    head     Uncomplicated asthma     presents prior to surgery for assessment of perioperative risk. The patient is at LOW risk for cardiovascular complications and at LOW risk for pulmonary complications of this LOW risk surgery.    --Approval given to proceed with proposed procedure  --Patient is taking diabetes medications. -----Hold usual oral and non-insulin diabetic meds (e.g. Metformin, Actos, Glipizide) while NPO.     No evidence of functionally compromising cardiac or pulmonary status  The patient is recommended to hold aspirin or NSAIDS for at least 1 day prior to surgery as she has done in the past.    Proceed with surgery as planned.      Laboratory studies:  CBC and BMP  Pregnancy testing was not indicated.    Cardiovascular: EKG was obtained.  Regular sinus rhythm without T wave abnormalities. Low voltage QRS. Unchanged from last EKG 5/19/15    Please contact our office if there are any further questions or information required about this patient.    Wood Lawrence, MS4  February 3, 2020    Staff note; I personally reviewed Ms. Palma's past medical history. I interviewed her and conducted a physical examination. I agree with the above assessment and plan. Also reviewed today's EKG with previous EKG 2015     HOLLY Koroma MD     Total time spent 25 minutes.  More than 50% of the time spent with Ms. Palma on counseling / coordinating her care         DC instructions

## 2020-02-03 NOTE — TELEPHONE ENCOUNTER
[facility]  Dept: [phone]     Patient: Elizabeth Palma   : 1955  MRN: [id number]  Encounter: 20       Pre Visit Assessment    Health Maintenance Due   Topic Date Due     ASTHMA ACTION PLAN  1955     ASTHMA CONTROL TEST  1955     ADVANCE CARE PLANNING  1955     SKIN CANCER SCREENING  1955     PNEUMOCOCCAL IMMUNIZATION 19-64 HIGHEST RISK (2 of 3 - PCV13) 2009     DIABETIC FOOT EXAM  2018     Chart Reviewed for Labs needed/Health Maintenance: Yes   If labs needed, orders placed:  No labs needed ,   Lab appointment scheduled:  No    Notes:  Patient confirmed they will attend appointment:  No  Appointment rescheduled?  No      Bertin Doherty at 2:29 PM on 2/3/2020

## 2020-02-03 NOTE — NURSING NOTE
Elizabeth Palma's chief complaint for this visit includes:  Chief Complaint   Patient presents with     RECHECK     Diabetic foot and toenail care     PCP: Horacio Sheridan    Referring Provider:  No referring provider defined for this encounter.    /79 (BP Location: Left arm, Patient Position: Sitting, Cuff Size: Adult Regular)   Pulse 68   Wt 76 kg (167 lb 8 oz)   LMP  (LMP Unknown)   SpO2 94%   BMI 31.65 kg/m    Data Unavailable     Do you need any medication refills at today's visit? No    Elma Wiseman, [facility]

## 2020-02-03 NOTE — LETTER
2/3/2020         RE: Elizabeth Palma  [address]  [facility] [address]        Dear Colleague,    Thank you for referring your patient, Elizabeth Palma, to the [facility]. Please see a copy of my visit note below.    Past Medical History:   Diagnosis Date     Abnormal MRI, cervical spine 10/15/2011    2011; mild changes noted. Study done for left arm symptoms Impression:  1. Mild multilevel degenerative disc disease with no significant canal or neural stenosis seen. motion artifact on the STIR images in these are not interpretable. The remaining images were interpreted      Autosomal dominant polycystic kidney disease 2011     (Problem list name updated by automated process. Provider to review and confirm.)     CMC DJD(carpometacarpal degenerative joint disease), localized primary 3/5/2013     -donor kidney transplant 3/20/2014     Depressive disorder 11/15/2012     DM type 2 (diabetes mellitus, type 2) (H) 2013     Encounter for long-term (current) use of other medications 2015     Family history of tremor 10/17/2011     Gastroesophageal reflux disease      Generalized anxiety disorder 11/15/2012     Glaucoma      Hyperlipidemia 10/15/2011     Hyperparathyroidism, secondary (H) 2015     Hypertension     resolved     Immunosuppressed status (H) 3/20/2014     Major depressive disorder, recurrent episode, moderate (H) 11/15/2012     Obesity (BMI 30-39.9)      OP (osteoporosis) T score -3.8 2009 T-score -3.7      LION (obstructive sleep apnoea) 10/15/2012    intol to cpa     Pain in joint, forearm -- L unhealed Fx 2013     Premature menopause age 37 7/10/2012    OCP (vaginal bldg)-->HT which she stopped 2 mo later documented at 2007 visit (age 51).      Restless leg syndrome      Rib fractures 2013     Sensory loss 10/17/2011    Bottom of feet; uncertain if there is a neuropathy per notes.       Stiffness of joint,  not elsewhere classified, hand 3/5/2013     Tremor 10/15/2011    head     Uncomplicated asthma      Patient Active Problem List   Diagnosis     Hypertension     Hyperlipidemia     Abnormal MRI, cervical spine     Sensory loss     Premature menopause age 37     OP (osteoporosis) T score -3.8     Major depressive disorder, recurrent episode, moderate (H)     Generalized anxiety disorder     CMC DJD(carpometacarpal degenerative joint disease), localized primary     Pain in joint, forearm -- L unhealed Fx     -donor kidney transplant     Immunosuppressed status (H)     Hyperparathyroidism, secondary (H)     Hyperparathyroidism (H)     Senile osteoporosis     Pain in joint involving ankle and foot     Nightmares associated with chronic post-traumatic stress disorder     Type 2 diabetes mellitus with peripheral neuropathy (H)     Posttraumatic stress disorder     Right knee pain     Left knee pain     Age-related osteoporosis without current pathological fracture     Narcissistic personality disorder (H)     Personal history of other drug therapy     Median sensory neuropathy, left     Marital conflict     Suicidal ideation     Autosomal dominant polycystic kidney disease     Secondary hyperparathyroidism (H)     Anemia, iron deficiency     Past Surgical History:   Procedure Laterality Date     ABDOMEN SURGERY       ANKLE SURGERY       C TRANSPLANTATION OF KIDNEY  3/2014     C/SECTION, LOW TRANSVERSE      x 2     CHOLECYSTECTOMY       COLONOSCOPY       ESOPHAGOSCOPY, GASTROSCOPY, DUODENOSCOPY (EGD), COMBINED N/A 2015    Procedure: COMBINED ESOPHAGOSCOPY, GASTROSCOPY, DUODENOSCOPY (EGD);  Surgeon: Sky Davey MD;  Location:  GI     ESOPHAGOSCOPY, GASTROSCOPY, DUODENOSCOPY (EGD), COMBINED N/A 2015    Procedure: COMBINED ESOPHAGOSCOPY, GASTROSCOPY, DUODENOSCOPY (EGD), BIOPSY SINGLE OR MULTIPLE;  Surgeon: Sky Davey MD;  Location:  GI     EYE SURGERY       LAPAROSCOPY, SURGICAL;  REPAIR INCISIONAL OR VENTRAL HERNIA       ORTHOPEDIC SURGERY       HERMINIA EN Y BOWEL  1990     WRIST SURGERY       Social History     Socioeconomic History     Marital status:      Spouse name: Rahat     Number of children: 2     Years of education: Not on file     Highest education level: Not on file   Occupational History     Comment: part-time   Social Needs     Financial resource strain: Not on file     Food insecurity:     Worry: Not on file     Inability: Not on file     Transportation needs:     Medical: Not on file     Non-medical: Not on file   Tobacco Use     Smoking status: Former Smoker     Smokeless tobacco: Never Used   Substance and Sexual Activity     Alcohol use: No     Alcohol/week: 0.0 standard drinks     Drug use: No     Sexual activity: Yes     Partners: Male     Birth control/protection: None     Comment: 1 partner   Lifestyle     Physical activity:     Days per week: Not on file     Minutes per session: Not on file     Stress: Not on file   Relationships     Social connections:     Talks on phone: Not on file     Gets together: Not on file     Attends Quaker service: Not on file     Active member of club or organization: Not on file     Attends meetings of clubs or organizations: Not on file     Relationship status: Not on file     Intimate partner violence:     Fear of current or ex partner: Not on file     Emotionally abused: Not on file     Physically abused: Not on file     Forced sexual activity: Not on file   Other Topics Concern     Parent/sibling w/ CABG, MI or angioplasty before 65F 55M? Not Asked   Social History Narrative     Not on file     Family History   Problem Relation Age of Onset     Mental Illness Other         family hx     Heart Disease Other      Diabetes Other      Cancer Other      Genetic Disorder Father      Mental Illness Father      Diabetes Father      Hypertension Father      Hyperlipidemia Mother      Diabetes Mother      Hypertension Mother      Mental  Illness Other      Diabetes Other      Glaucoma No family hx of      Macular Degeneration No family hx of      Hypertension No family hx of      Lab Results   Component Value Date    A1C 7.1 12/17/2019    A1C 7.2 04/24/2019    A1C 7.4 11/27/2018    A1C 6.4 01/19/2018    A1C 6.5 05/19/2017     SUBJECTIVE FINDINGS:  This 64-year-old female returns to clinic for diabetic foot check and onychauxis, onychocryptosis. She relates she is doing well. Relates no ulcers or sores since we have seen her last, no injuries. She relates she is wearing diabetic shoes. She relates she does have some tingling in her feet. It has gotten better with taking gabapentin. She relates otherwise is doing well.       OBJECTIVE FINDINGS:  DP and PT are 2/4 bilaterally. She has decreased ankle joint dorsiflexion bilaterally. She has dorsally contracted digits bilaterally 1-5. She has flexible flat foot with dorsal medial first MPJ prominence. Deep tendon reflexes are intact bilaterally. Sharp/dull is decreased with 5.07 Bosler-Yaneth monofilament on the right first MPJ and the left fifth MPJ I is decreased, otherwise it is intact bilaterally. There is no erythema, no drainage, no odor, no calor bilaterally. She has incurvated nails 1-5 bilaterally to differing degrees.       ASSESSMENT AND PLAN: Onychauxis bilaterally. She is diabetic with peripheral neuropathy. Diagnosis and treatment discussed with her. All the nails were reduced bilaterally upon consent. She will return to clinic to see me in about 3 months.         Again, thank you for allowing me to participate in the care of your patient.        Sincerely,        Javier Tuttle DPM

## 2020-02-03 NOTE — NURSING NOTE
Chief Complaint   Patient presents with     Pre-Op Exam     DOS: 2/06/20 with Dr. Francisco Fuentes @ [facility]. Right arm carpal tunnel and nerve damage     Kimberly Nissen, EMT at 12:06 PM on 2/3/2020

## 2020-02-04 LAB — INTERPRETATION ECG - MUSE: NORMAL

## 2020-02-10 ENCOUNTER — ALLIED HEALTH/NURSE VISIT (OUTPATIENT)
Dept: SURGERY | Facility: CLINIC | Age: 65
End: 2020-02-10
Payer: MEDICARE

## 2020-02-10 VITALS — WEIGHT: 167.1 LBS | HEIGHT: 61 IN | BODY MASS INDEX: 31.55 KG/M2

## 2020-02-10 DIAGNOSIS — Z71.3 NUTRITIONAL COUNSELING: Primary | ICD-10-CM

## 2020-02-10 DIAGNOSIS — E11.42 TYPE 2 DIABETES MELLITUS WITH PERIPHERAL NEUROPATHY (H): ICD-10-CM

## 2020-02-10 ASSESSMENT — MIFFLIN-ST. JEOR: SCORE: 1255.08

## 2020-02-10 NOTE — PATIENT INSTRUCTIONS
Goals:   1. Eat 3 meals with lean protein at each meal, along with a non-starchy vegetable or whole fruit, up to 1/2 c carb at a meal.  2. If needing a snack, have vegetables or protein snack. Limit 3x/day.    3. Walk 15 min daily, increasing as able. (Starting physical therapy)  4. Continue working on Meal planning for all meals. Consider buying a non-fat cottage cheese, and/or eggs instead of bagel/grits/toast for breakfast.    5. Drink 48-64 ounces of water/fluids a day.    Tips:  - Try using unflavored, unsweetened almond milk + vanilla extract (lower in sugar than the vanilla almond milk)  - Try adding 1/2 banana to protein shake to help with cramping  - Could check out [employer] for other protein powders    Follow up with RD in one month    Shannon Lopez MS, RD, LD  If you need to schedule or reschedule with a dietitian please call [phone].      Interested in working with a health ?  Health coaches work with you to improve your overall health and wellbeing.  They look at the whole person, and may involve discussion of different areas of life, including, but not limited to the four pillars of health (sleep, exercise, nutrition, and stress management). Discuss with your care team if you would like to start working a health .     Health Coaching-3 Pack:      $99 for three health coaching visits    Visits may be done in person or via phone    Coaching is a partnership between the  and the client; Coaches do not prescribe or diagnose    Coaching helps inspire the client to reach his/her personal goals

## 2020-02-10 NOTE — PROGRESS NOTES
"Nutrition Reassessment  Reason For Visit:  Elizabeth Palma is a 64 year-old female presenting today for nutrition follow-up, s/p \"gastric bypass in 1990 at [facility]\" per Pt report. She is seeing medical weight management.  She was referred by Dr. Irene.  Note: Pt had a kidney transplant on March 20, 2014.    Pt was accompanied by her  Rahat.     Pt signed Medicare ABN form which is good for 1 year (10/28/19-10/28/20).    Patient with Co-morbidities of obesity including:  Type II DM yes  Renal Failure no  Sleep apnea yes  Hypertension no   Dyslipidemia yes  Joint pain no  Back pain no  GERD yes     Anthropometrics:  Height: 61\"  Pre-op Weight: 265 lbs per Pt report  (Pt reported that she initially was at 215 lbs in 2015)    Current Weight: 167.1 lbs (+5.8 lbs in the past 1 month)    Current Vitamins/Minerals: 3000 International Units vitamin D/day, Calcium 2x daily, vitamin C, vitamin B12 daily pill daily - every other day now per PCP, B-complex, iron  *Pt stated that she was told not to take other vitamins/minerals by MD.   3/27/19: Started Naltrexone at most recent MD appointment  4/30/19: patient reported that she is more hungry and is eating larger portions since medication change  5/28/19: taking Topiramate and Naltrexone   11:26: Continues topiramate and Naltrexone. Pt states it took a couple months before she felt a benefit from Naltrexone.   1/2/20: She is having more leg cramps recently at night. Recent labs show she is low in iron. Taking iron per her PCP.  2/10/20: Leg cramps are improving per her report. She's had 3 iron infusions in the past month.    Nutrition History:  - Lactose intolerance ever since bariatric surgery.  - Pt stated that she has osteoporosis; however, she stated that her doctors don't want her to take any vitamins or minerals due to her kidney transplant.  - Pt states she does not like the taste of protein drinks (shakes and clear liquids), yogurt, beans/lentils or any of " "the high protein products we have in clinic. Pt reports that she does not like meat, and does not eat a lot of it.   - Pt and  follow a Kosher diet   - Pt also stated on previous visits that she will not record food intake due to the fact that every time she does this, she simply does not eat as she doesn't want to record.  She stated that her therapist strongly advises against recording food intake for this reason.   - Cannot eat bone broth because it is not kosher.    (2/10/20):  - Drinking [employer] protein shake + some frozen fruit - likes this shake  - She's been feeling sick for the past month (double ear infection)  - Leg cramping is improving  - Still taking Lasix (for her blood pressure)    Physical Activity Note: None at this time. Recently feeling sick and weak so not walking in the halls anymore.    Recent Diet Recall:  Breakfast: skip  Lunch: 1/2 salami sandwich  Dinner: tuna  Snacks: \"A lot less snacking\" 4x/day - eating pea snacks (5-6 servings over 3 days); mini pretzels (4-5 per serving); 1/2 apples; fruit compote, peanuts, 13 g protein popcorn  Beverages: Propel (4 x 16 oz); iced tea (with artifical sweetner); SF rootbeer  Dining Out: 3 meals/week     Progress Towards Previous Goals:  1. Eat 3 meals with lean protein at each meal, along with a non-starchy vegetable or whole fruit, up to 1/2 c carb at a meal.  - Not met. Sometimes skipping meals.  2. If needing a snack, have vegetables or protein snack. Limit 3x/day.    - Not meeting but she has cut down on snacking.  3. Walk 15 min daily, increasing as able. (Starting physical therapy)  - Not meeting  4. Continue working on Meal planning for all meals. Consider buying a non-fat cottage cheese, and/or eggs instead of bagel/grits/toast for breakfast.    - Continues/improving - having some eggs    Nutrition Prescription:   Grams Protein: 60 (minimum) - Not meeting consistently.   Amount of Fluid: 48-64 oz    Nutrition Diagnosis  Current: " Overweight related to history of excessive energy intake, variable eating habits/intake and lack of sufficient exercise as evidenced by pt report and BMI > 25. - Continues    Intervention  Reviewed her progress towards previous goals. Provided encouragement and support. Pt upset with weight gain this month. Reminded her that she is still recovering from illnesses over the past month, getting a lot of fluids (iron infusions), and has not been active over this past month and these all may be contributing to her weight gain. Encouraged her to continue drinking propel water for electrolytes and ensure she is drinking at least 48 oz of water daily. Pt says cramping is improving but some still present. Recommended she add 1/2 banana to her protein shake for added potassium. Also recommend she use unsweetened almond milk to lower sugar in her diet (suggested she add vanilla extract for flavor). Also looked up a kosher website with additional protein shakes and bars and provided patient link. Provided pt with list of goals.      Patient Understanding: good  Expected Compliance: good    Goals:   1. Eat 3 meals with lean protein at each meal, along with a non-starchy vegetable or whole fruit, up to 1/2 c carb at a meal.  2. If needing a snack, have vegetables or protein snack. Limit 3x/day.    3. Walk 15 min daily, increasing as able. (Starting physical therapy)  4. Continue working on Meal planning for all meals. Consider buying a non-fat cottage cheese, and/or eggs instead of bagel/grits/toast for breakfast.    5. Drink 48-64 ounces of water/fluids a day.    Tips:  - Try using unflavored, unsweetened almond milk + vanilla extract (lower in sugar than the vanilla almond milk)  - Try adding 1/2 banana to protein shake to help with cramping  - Could check out [employer] for other protein powders    Follow-Up: 1 month or PRN     Time spent with patient: 15 minutes  Shannon Lopez MS, RD, LD

## 2020-02-11 DIAGNOSIS — N18.5 CHRONIC KIDNEY DISEASE, STAGE V (H): ICD-10-CM

## 2020-02-12 RX ORDER — SIMVASTATIN 20 MG
20 TABLET ORAL AT BEDTIME
Qty: 90 TABLET | Refills: 3 | Status: SHIPPED | OUTPATIENT
Start: 2020-02-12 | End: 2021-01-25

## 2020-02-17 ENCOUNTER — OFFICE VISIT (OUTPATIENT)
Dept: INTERNAL MEDICINE | Facility: CLINIC | Age: 65
End: 2020-02-17
Payer: MEDICARE

## 2020-02-17 ENCOUNTER — INFUSION THERAPY VISIT (OUTPATIENT)
Dept: INFUSION THERAPY | Facility: CLINIC | Age: 65
End: 2020-02-17
Attending: INTERNAL MEDICINE
Payer: MEDICARE

## 2020-02-17 VITALS
OXYGEN SATURATION: 95 % | SYSTOLIC BLOOD PRESSURE: 137 MMHG | DIASTOLIC BLOOD PRESSURE: 82 MMHG | HEIGHT: 61 IN | BODY MASS INDEX: 31.7 KG/M2 | TEMPERATURE: 98.2 F | HEART RATE: 72 BPM | RESPIRATION RATE: 16 BRPM | WEIGHT: 167.9 LBS

## 2020-02-17 VITALS
SYSTOLIC BLOOD PRESSURE: 138 MMHG | OXYGEN SATURATION: 97 % | HEART RATE: 60 BPM | TEMPERATURE: 98.1 F | DIASTOLIC BLOOD PRESSURE: 85 MMHG

## 2020-02-17 DIAGNOSIS — M81.0 SENILE OSTEOPOROSIS: ICD-10-CM

## 2020-02-17 DIAGNOSIS — M81.0 AGE-RELATED OSTEOPOROSIS WITHOUT CURRENT PATHOLOGICAL FRACTURE: Primary | ICD-10-CM

## 2020-02-17 DIAGNOSIS — G47.62 NOCTURNAL LEG CRAMPS: ICD-10-CM

## 2020-02-17 DIAGNOSIS — R09.89 DIMINISHED PULSES IN LOWER EXTREMITY: Primary | ICD-10-CM

## 2020-02-17 DIAGNOSIS — M79.661 PAIN IN RIGHT LOWER LEG: ICD-10-CM

## 2020-02-17 DIAGNOSIS — D50.9 IRON DEFICIENCY ANEMIA, UNSPECIFIED IRON DEFICIENCY ANEMIA TYPE: ICD-10-CM

## 2020-02-17 DIAGNOSIS — M79.662 PAIN IN LEFT LOWER LEG: ICD-10-CM

## 2020-02-17 DIAGNOSIS — Z92.29 PERSONAL HISTORY OF OTHER DRUG THERAPY: ICD-10-CM

## 2020-02-17 DIAGNOSIS — Z94.0 DECEASED-DONOR KIDNEY TRANSPLANT: ICD-10-CM

## 2020-02-17 PROCEDURE — 96372 THER/PROPH/DIAG INJ SC/IM: CPT

## 2020-02-17 PROCEDURE — 25000128 H RX IP 250 OP 636: Mod: ZF | Performed by: INTERNAL MEDICINE

## 2020-02-17 RX ADMIN — DENOSUMAB 60 MG: 60 INJECTION SUBCUTANEOUS at 08:48

## 2020-02-17 ASSESSMENT — PAIN SCALES - GENERAL
PAINLEVEL: MILD PAIN (3)
PAINLEVEL: MILD PAIN (3)

## 2020-02-17 ASSESSMENT — MIFFLIN-ST. JEOR: SCORE: 1258.65

## 2020-02-17 NOTE — NURSING NOTE
Chief Complaint   Patient presents with     Recheck Medication     Pt comes in for a follow up.         Kunal Dean, EMT on 2/17/2020 at 10:39 AM

## 2020-02-17 NOTE — PROGRESS NOTES
Patient presents to the [facility] for Prolia.  Order written by Dr. Byers, Lizbet Glez  was completed today. Name and  verified with patient. See MAR for medication details. Medication was divided into 1 syringes by pharmacy and given in the following sites left lower abdomen. Patient tolerated injection well and was discharged to home.  Patient is to return in 6 months.     Magalie Chowdhury MA    Administrations This Visit     denosumab (PROLIA) injection 60 mg     Admin Date  2020 Action  Given Dose  60 mg Route  Subcutaneous Administered By  Magalie Chowdhury MA

## 2020-02-17 NOTE — PROGRESS NOTES
"ASSESSMENT/PLAN:  Iron deficiency anemia - check ferritin, iron studies and CBC    Nocturnal cramps - poor pulses in her leg - could be venous or arterial    - increase gabapentin   - check venous flow in the legs with ultrasound and arterial flow with doppler     - Consider visiting with pharmacy to see if the meds may cause this - she says she has visited before    Horacio Sheridan MD, FACP      Chief complaint:  Elizabeth Palma is a 64 year old female presents for   Chief Complaint   Patient presents with     Recheck Medication     Pt comes in for a follow up. Would like to discuss wrist and ankle pain.        SUBJECTIVE:  She says she is very tired and is sleeping a lot.  She is sleeping 8-10 hours at night but she wakes up a lot from pain and cramps.  She wakes up and lies in bed.  Thecramps will be in the front and back of both lower legs - last 30 seconds or so and go away.  Then it will happen again.  During the day she falls asleep easily.  She says she wakes up and is not tired.  She says the gabapentin helped for 1 week but then stopped working.    No fever - ear infection were there before the surgery a week ago  She is not moving very much  She says she is not hungry and is not eating but she is also gaining weight  She just finished iron infusions 9 days ago  She is drinking propel water throughout the day  She says she takes her meds about an hour before she is tired.  She says her mood is \"ok for this time of the year\" and sees a therapist every other week    Medications and allergies were reviewed by me today.       Patient Active Problem List    Diagnosis Date Noted     Type 2 diabetes mellitus with peripheral neuropathy (H) 2015     Priority: High     -donor kidney transplant 2014     Priority: High     Major depressive disorder, recurrent episode, moderate (H) 11/15/2012     Priority: High     OP (osteoporosis) T score -3.8 2009     Priority: High     2007 T-score -3.7       " Anemia, iron deficiency 01/10/2020     Priority: Medium     Median sensory neuropathy, left 03/11/2019     Priority: Medium     Nerve conduction study down at [facility] in [place] on 3/7/19 shows: mild left median neuropathy at wrist, left median motor distal latency is normal, the left ulnar motor conduction velocity is abnormally slowed       Personal history of other drug therapy 11/13/2018     Priority: Medium     Marital conflict 08/08/2018     Priority: Medium     Suicidal ideation 08/07/2018     Priority: Medium     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     Priority: Medium     Age-related osteoporosis without current pathological fracture 08/10/2016     Priority: Medium     Right knee pain 02/08/2016     Priority: Medium     Left knee pain 02/08/2016     Priority: Medium     Posttraumatic stress disorder 11/24/2015     Priority: Medium     Nightmares associated with chronic post-traumatic stress disorder 10/27/2015     Priority: Medium     Pain in joint involving ankle and foot 07/13/2015     Priority: Medium     Senile osteoporosis 05/29/2015     Priority: Medium     Hyperparathyroidism (H) 02/26/2015     Priority: Medium     Problem list name updated by automated process. Provider to review       Hyperparathyroidism, secondary (H) 02/25/2015     Priority: Medium     Secondary hyperparathyroidism (H) 02/25/2015     Priority: Medium     Immunosuppressed status (H) 03/20/2014     Priority: Medium     Pain in joint, forearm -- L unhealed Fx 05/21/2013     Priority: Medium     CMC DJD(carpometacarpal degenerative joint disease), localized primary 03/05/2013     Priority: Medium     Generalized anxiety disorder 11/15/2012     Priority: Medium     Premature menopause age 37 07/10/2012     Priority: Medium     OCP (vaginal bldg)-->HT which she stopped 2 mo later documented at Jan 12, 2007 visit (age 51).       Sensory loss 10/17/2011     Priority: Medium     Bottom of feet; uncertain if  "there is a neuropathy per notes.        Hypertension 10/15/2011     Priority: Medium     Hyperlipidemia 10/15/2011     Priority: Medium     Abnormal MRI, cervical spine 10/15/2011     Priority: Medium     4/2011; mild changes noted. Study done for left arm symptoms  Impression:   1. Mild multilevel degenerative disc disease with no significant canal  or neural stenosis seen. motion artifact on the STIR images in these  are not interpretable. The remaining images were interpreted       Autosomal dominant polycystic kidney disease 07/05/2011     Priority: Medium     Overview:   Overview:   (Problem list name updated by automated process. Provider to review and confirm.)         PHYSICAL EXAM:  /82 (BP Location: Left arm, Patient Position: Sitting, Cuff Size: Adult Regular)   Pulse 72   Temp 98.2  F (36.8  C) (Oral)   Resp 16   Ht 1.549 m (5' 0.98\")   Wt 76.2 kg (167 lb 14.4 oz)   LMP  (LMP Unknown)   SpO2 95%   BMI 31.75 kg/m    Constitutional: no distress, comfortable, pleasant   Cardiovascular: regular rate and rhythm, normal S1 and S2, no murmurs, rubs or gallops, could not feel right sided PT or DP pulses, on the left I could feel the DP but not the PT  Respiratory: clear to auscultation, no wheezes or crackles, normal breath sounds   Musculoskeletal: no edema   Skin: no concerning lesions          "

## 2020-02-17 NOTE — PATIENT INSTRUCTIONS
[facility] Medication Refill Request Information:  * Please contact your pharmacy regarding ANY request for medication refills.  ** [facility] Prescription Fax = [phone]  * Please allow 3 business days for routine medication refills.  * Please allow 5 business days for controlled substance medication refills.     [facility] Test Result notification information:  *You will be notified with in 7-10 days of your appointment day regarding the results of your test.  If you are on MyChart you will be notified as soon as the provider has reviewed the results and signed off on them.    [facility]: [phone]

## 2020-02-17 NOTE — PROGRESS NOTES
Labs from 2-3-2020 reviewed.  Creatinine 0.90 Calcium 9.1  Phosphorus level from 8- 3-2-2020 within 6 months for okay to proceed.

## 2020-02-18 ASSESSMENT — ASTHMA QUESTIONNAIRES: ACT_TOTALSCORE: 19

## 2020-02-19 ENCOUNTER — TELEPHONE (OUTPATIENT)
Dept: INTERNAL MEDICINE | Facility: CLINIC | Age: 65
End: 2020-02-19

## 2020-02-19 DIAGNOSIS — E11.42 TYPE 2 DIABETES MELLITUS WITH DIABETIC POLYNEUROPATHY, WITHOUT LONG-TERM CURRENT USE OF INSULIN (H): ICD-10-CM

## 2020-02-19 NOTE — TELEPHONE ENCOUNTER
[facility] Call Center    Phone Message    May a detailed message be left on voicemail: yes     Reason for Call: Medication Question or concern regarding medication   Prescription Clarification  Name of Medication: gabapentin (NEURONTIN) 300 MG capsule  Prescribing Provider: Dr. Sheridan    Pharmacy:    [id number] - [place], [place] - [address]      What on the order needs clarification? Saw Dr. Sheridan on 02/17/2020 and was wondering if Dr. Sheridan is still going to increase her medication or not. Please call pt or her  to let them know.           Action Taken: Message routed to:  [facility]    Travel Screening: Not Applicable

## 2020-02-21 RX ORDER — GABAPENTIN 300 MG/1
600 CAPSULE ORAL AT BEDTIME
Qty: 180 CAPSULE | Refills: 3 | Status: SHIPPED | OUTPATIENT
Start: 2020-02-21 | End: 2021-03-02

## 2020-02-21 NOTE — TELEPHONE ENCOUNTER
Called  to advise MD jay and sent to pharmacy. Bronwyn Casiano Paramedic on 2/21/2020 at 10:04 AM

## 2020-02-24 ENCOUNTER — ANCILLARY PROCEDURE (OUTPATIENT)
Dept: ULTRASOUND IMAGING | Facility: CLINIC | Age: 65
End: 2020-02-24
Attending: INTERNAL MEDICINE
Payer: MEDICARE

## 2020-02-24 ENCOUNTER — TRANSFERRED RECORDS (OUTPATIENT)
Dept: HEALTH INFORMATION MANAGEMENT | Facility: CLINIC | Age: 65
End: 2020-02-24

## 2020-02-24 DIAGNOSIS — D50.9 IRON DEFICIENCY ANEMIA, UNSPECIFIED IRON DEFICIENCY ANEMIA TYPE: ICD-10-CM

## 2020-02-24 DIAGNOSIS — R09.89 DIMINISHED PULSES IN LOWER EXTREMITY: ICD-10-CM

## 2020-02-24 DIAGNOSIS — M79.661 PAIN IN RIGHT LOWER LEG: ICD-10-CM

## 2020-02-24 DIAGNOSIS — G47.62 NOCTURNAL LEG CRAMPS: ICD-10-CM

## 2020-02-24 LAB
ERYTHROCYTE [DISTWIDTH] IN BLOOD BY AUTOMATED COUNT: 18.7 % (ref 10–15)
FERRITIN SERPL-MCNC: 128 NG/ML (ref 8–252)
HCT VFR BLD AUTO: 43.5 % (ref 35–47)
HGB BLD-MCNC: 13.5 G/DL (ref 11.7–15.7)
IRON SATN MFR SERPL: 27 % (ref 15–46)
IRON SERPL-MCNC: 72 UG/DL (ref 35–180)
MCH RBC QN AUTO: 27.5 PG (ref 26.5–33)
MCHC RBC AUTO-ENTMCNC: 31 G/DL (ref 31.5–36.5)
MCV RBC AUTO: 89 FL (ref 78–100)
PLATELET # BLD AUTO: 148 10E9/L (ref 150–450)
RBC # BLD AUTO: 4.91 10E12/L (ref 3.8–5.2)
TIBC SERPL-MCNC: 265 UG/DL (ref 240–430)
WBC # BLD AUTO: 4.1 10E9/L (ref 4–11)

## 2020-02-25 DIAGNOSIS — J30.2 SEASONAL ALLERGIC RHINITIS: ICD-10-CM

## 2020-02-25 RX ORDER — FLUTICASONE PROPIONATE 50 MCG
1 SPRAY, SUSPENSION (ML) NASAL DAILY
Qty: 48 G | Refills: 3 | Status: SHIPPED | OUTPATIENT
Start: 2020-02-25 | End: 2021-03-17

## 2020-02-29 DIAGNOSIS — E11.42 TYPE 2 DIABETES MELLITUS WITH DIABETIC POLYNEUROPATHY, WITHOUT LONG-TERM CURRENT USE OF INSULIN (H): ICD-10-CM

## 2020-03-03 DIAGNOSIS — E66.01 MORBID OBESITY DUE TO EXCESS CALORIES (H): ICD-10-CM

## 2020-03-03 RX ORDER — METFORMIN HCL 500 MG
1500 TABLET, EXTENDED RELEASE 24 HR ORAL
Qty: 270 TABLET | Refills: 0 | Status: SHIPPED | OUTPATIENT
Start: 2020-03-03 | End: 2020-05-29

## 2020-03-03 NOTE — TELEPHONE ENCOUNTER
[facility] Call Center    Phone Message    May a detailed message be left on voicemail: yes     Reason for Call: Medication Refill Request    Has the patient contacted the pharmacy for the refill? Yes    Name of medication being requested: metFORMIN HCl 500 MG   Provider who prescribed the medication: Dr. Horacio Sheridan  Pharmacy: [id number] [place] [place] [facility] [address] [facility]  Date medication is needed: 3/3/2020    Dr. Sheridan did submit a refill request on 2/29/2020, but [facility] contacted patient on 3/2/2020 to have metFORMIN HCl 500 MG refill request submitted to pharmacy. Please advise.     Action Taken: Message routed to:  [facility]    Travel Screening: Not Applicable

## 2020-03-03 NOTE — TELEPHONE ENCOUNTER
metFORMIN HCl ER Oral Tablet Extended Release 24 Hour 500 MG   Last Written Prescription Date:  11/27/2019  Last Fill Quantity: 270,   # refills: 0  Last Office Visit : 2/17/2020  Future Office visit:  None    Routing refill request to provider for review/approval because:  Labs Due:  Micro Albumin       90 Dorothy sent to pharm  Clinic notified    Khushboo Emerson RN  Central Triage Red Flags/Med Refills

## 2020-03-04 RX ORDER — TOPIRAMATE 200 MG/1
TABLET, FILM COATED ORAL
Qty: 60 TABLET | Refills: 4 | OUTPATIENT
Start: 2020-03-04

## 2020-03-09 ENCOUNTER — ALLIED HEALTH/NURSE VISIT (OUTPATIENT)
Dept: SURGERY | Facility: CLINIC | Age: 65
End: 2020-03-09
Payer: MEDICARE

## 2020-03-09 VITALS — BODY MASS INDEX: 32.4 KG/M2 | WEIGHT: 171.6 LBS | HEIGHT: 61 IN

## 2020-03-09 DIAGNOSIS — Z71.3 NUTRITIONAL COUNSELING: Primary | ICD-10-CM

## 2020-03-09 DIAGNOSIS — E11.42 TYPE 2 DIABETES MELLITUS WITH PERIPHERAL NEUROPATHY (H): ICD-10-CM

## 2020-03-09 ASSESSMENT — MIFFLIN-ST. JEOR: SCORE: 1275.49

## 2020-03-09 NOTE — PROGRESS NOTES
"Nutrition Reassessment  Reason For Visit:  Elizabeth Palma is a 64 year-old female presenting today for nutrition follow-up, s/p \"gastric bypass in 1990 at [facility]\" per Pt report. She is seeing medical weight management.  She was referred by Dr. Irene.  Note: Pt had a kidney transplant on March 20, 2014.    Pt was accompanied by her  Rahat.     Pt signed Medicare ABN form which is good for 1 year (10/28/19-10/28/20).    Patient with Co-morbidities of obesity including:  Type II DM yes  Renal Failure no  Sleep apnea yes  Hypertension no   Dyslipidemia yes  Joint pain no  Back pain no  GERD yes     Anthropometrics:  Height: 61\"  Pre-op Weight: 265 lbs per Pt report  (Pt reported that she initially was at 215 lbs in 2015)    Current Weight: 171.6 lbs (+4.5 lbs in the past 1 month)    Current Vitamins/Minerals: 3000 International Units vitamin D/day, Calcium 2x daily, vitamin C, vitamin B12 every other day per PCP, B-complex, iron  *Pt stated that she was told not to take other vitamins/minerals by MD.   3/27/19: Started Naltrexone at most recent MD appointment  4/30/19: patient reported that she is more hungry and is eating larger portions since medication change  5/28/19: taking Topiramate and Naltrexone   11:26: Continues topiramate and Naltrexone. Pt states it took a couple months before she felt a benefit from Naltrexone.   1/2/20: She is having more leg cramps recently at night. Recent labs show she is low in iron. Taking iron per her PCP.  2/10/20: Leg cramps are improving per her report. She's had 3 iron infusions in the past month.    Nutrition History:  - Lactose intolerance ever since bariatric surgery.  - Pt stated that she has osteoporosis; however, she stated that her doctors don't want her to take any vitamins or minerals due to her kidney transplant.  - Pt states she does not like the taste of protein drinks (shakes and clear liquids), yogurt, beans/lentils or any of the high protein products " "we have in clinic. Pt reports that she does not like meat, and does not eat a lot of it.   - Pt and  follow a Kosher diet   - Pt also stated on previous visits that she will not record food intake due to the fact that every time she does this, she simply does not eat as she doesn't want to record.  She stated that her therapist strongly advises against recording food intake for this reason.   - Cannot eat bone broth because it is not kosher.    (2/10/20):  - Drinking [employer] protein shake + some frozen fruit - likes this shake  - She's been feeling sick for the past month (double ear infection)  - Leg cramping is improving  - Still taking Lasix (for her blood pressure)    (3/9/20):  - Using Built Bar to get some more protein in (~15 g pro per bar)  - Skipping meals and not eating much protein  - Still feeling tired, depression has been bad lately, seeing a therapist to help with this  - No appetite or motivation to eat. Feels weak (likely r/t not eating much throughout the day)  - Pretzels and dip, chips, soup, cookies - high CHO snacks/meals    Physical Activity Note: None at this time. Recently feeling sick and weak so not walking in the halls anymore.    Recent Diet Recall:  Breakfast: skip  Lunch: cookie and hot dog  Dinner: tuna  Snacks: \"A lot less snacking\" 4x/day - eating pea snacks (5-6 servings over 3 days); mini pretzels (4-5 per serving); 1/2 apples; fruit compote, peanuts, 13 g protein popcorn  Beverages: Propel (4 x 16 oz); iced tea (with artifical sweetner); SF rootbeer  Dining Out: 3 meals/week     Progress Towards Previous Goals:  1. Eat 3 meals with lean protein at each meal, along with a non-starchy vegetable or whole fruit, up to 1/2 c carb at a meal.  - Not met. Often skipping meals.  2. If needing a snack, have vegetables or protein snack. Limit 3x/day.    - Not meeting  3. Walk 15 min daily, increasing as able. (Starting physical therapy)  - Not meeting - feeling weak and tired  4. " "Continue working on Meal planning for all meals. Consider buying a non-fat cottage cheese, and/or eggs instead of bagel/grits/toast for breakfast.    - Not meeting. Not meal planning and finding herself choosing the \"easy options\" once meal time arrives and she has no plan for what to eat    Nutrition Prescription:   Grams Protein: 60 (minimum) - Not meeting  Amount of Fluid: 48-64 oz    Nutrition Diagnosis  Current: Overweight related to history of excessive energy intake, variable eating habits/intake and lack of sufficient exercise as evidenced by pt report and BMI > 25. - Continues    Intervention  Reviewed her progress towards previous goals. Provided encouragement and support. Discussed with pt that she is likely feeling weak/tired because she is hardly eating throughout the day. Encouraged her to drink her protein shake for breakfast, and not skip lunch and dinner. Encouraged her to include protein at each meal and start planning her meals on a daily basis to avoid choosing the high CHO, low protein \"easy options\". Encouraged increased activity as able (walking the halls). Provided pt with list of goals.      Patient Understanding: good  Expected Compliance: good    Goals:   1. Eat 3 meals with lean protein at each meal, along with a non-starchy vegetable or whole fruit, up to 1/2 c carb at a meal. Plan every morning what you plan to eat for breakfast, lunch, and dinner for that day.   Breakfast: Her[employer] shake + fruit   Lunch: protein + fruit or vegetable   Dinner: protein + fruit or vegetable  2. If needing a snack, have vegetables or protein snack. Limit 3x/day.    3. Walk 15 min daily, increasing as able. (Starting physical therapy)  4. Continue working on Meal planning for all meals. Consider buying a non-fat cottage cheese, and/or eggs instead of bagel/grits/toast for breakfast.    5. Drink 48-64 ounces of water/fluids a day.    Tips:  - Try using unflavored, unsweetened almond milk + vanilla extract " (lower in sugar than the vanilla almond milk)  - Try adding 1/2 banana to protein shake to help with cramping  - Could check out [employer] for other protein powders    Follow-Up: 1 month or PRN     Time spent with patient: 30 minutes  Shannon Lopez, MS, RD, LD

## 2020-03-09 NOTE — PATIENT INSTRUCTIONS
Goals:   1. Eat 3 meals with lean protein at each meal, along with a non-starchy vegetable or whole fruit, up to 1/2 c carb at a meal. Plan every morning what you plan to eat for breakfast, lunch, and dinner for that day.   Breakfast: [employer] shake + fruit   Lunch: protein + fruit or vegetable   Dinner: protein + fruit or vegetable  2. If needing a snack, have vegetables or protein snack. Limit 3x/day.    3. Walk 15 min daily, increasing as able. (Starting physical therapy)  4. Continue working on Meal planning for all meals. Consider buying a non-fat cottage cheese, and/or eggs instead of bagel/grits/toast for breakfast.    5. Drink 48-64 ounces of water/fluids a day.    Tips:  - Try using unflavored, unsweetened almond milk + vanilla extract (lower in sugar than the vanilla almond milk)  - Try adding 1/2 banana to protein shake to help with cramping  - Could check out [employer] for other protein powders    Follow up with RD in one month (Carrie Chawla RD or Mireille Aguirre RD)    Shannon Lopez MS, RD, LD  If you need to schedule or reschedule with a dietitian please call [phone].      Interested in working with a health ?  Health coaches work with you to improve your overall health and wellbeing.  They look at the whole person, and may involve discussion of different areas of life, including, but not limited to the four pillars of health (sleep, exercise, nutrition, and stress management). Discuss with your care team if you would like to start working a health .     Health Coaching-3 Pack:      $99 for three health coaching visits    Visits may be done in person or via phone    Coaching is a partnership between the  and the client; Coaches do not prescribe or diagnose    Coaching helps inspire the client to reach his/her personal goals

## 2020-03-11 ENCOUNTER — TRANSFERRED RECORDS (OUTPATIENT)
Dept: HEALTH INFORMATION MANAGEMENT | Facility: CLINIC | Age: 65
End: 2020-03-11

## 2020-03-17 ENCOUNTER — TELEPHONE (OUTPATIENT)
Dept: TRANSPLANT | Facility: CLINIC | Age: 65
End: 2020-03-17

## 2020-03-17 NOTE — TELEPHONE ENCOUNTER
Left message for patient to change schedule  appointments.  Asked patient to call back to schedule.

## 2020-03-23 ENCOUNTER — VIRTUAL VISIT (OUTPATIENT)
Dept: NEPHROLOGY | Facility: CLINIC | Age: 65
End: 2020-03-23
Attending: INTERNAL MEDICINE
Payer: COMMERCIAL

## 2020-03-23 VITALS — WEIGHT: 170 LBS | BODY MASS INDEX: 32.14 KG/M2

## 2020-03-23 DIAGNOSIS — Z48.298 AFTERCARE FOLLOWING ORGAN TRANSPLANT: Primary | ICD-10-CM

## 2020-03-23 NOTE — PROGRESS NOTES
"TRANSPLANT NEPHROLOGY TELEMEDICINE VISIT    Elizabeth Palma is a 64 year old female who is being evaluated via a billable telephone/telemedicine visit on March 23, 2020.     The patient has been notified of following:     \"This telephone/telemedicine visit will be conducted via a call/video between you and your physician/provider. We have found that certain health care needs can be provided without the need for a physical exam.  This service lets us provide the care you need with a short phone/video conversation.  If a prescription is necessary we can send it directly to your pharmacy.  If lab work is needed we can place an order for that and you can then stop by our lab to have the test done at a later time.    If during the course of the call/video the physician/provider feels a telephone/telemedicine visit is not appropriate, you will not be charged for this service.\"     Assessment & Plan     # DDKT:               - Baseline Cr ~ 0.9-1.0; Stable              - Proteinuria: Normal              - Date of DSA last checked: 3/2016   Latest DSA: No              - BK Viremia: No              - Kidney Tx Biopsy: Yes 4/2014 - [facility]     # Immunosuppression: Cyclosporine (goal  ) and Mycophenolate mofetil (goal  1-3.5)              - Changes: No     # Prophylaxis:               - PJP on Bactrim.      # Hypertension: Controlled; Goal BP: < 140/90              - Changes: No     # Diabetes: reasonable but gained weight with her ankle troubles      # Mineral Bone Disorder:               - Secondary renal hyperparathyroidism; PTH level is: Mildly elevated              - Vitamin D; level is: Not checked recently. Continue on Vitamin D supplementation.               - Calcium; level is: Normal              - Phosphorus; level is: Not checked recently       # Electrolytes:   - Potassium; level: Normal  - Magnesium; level: Not checked recently  - Bicarbonate; level: Normal     # Transplant History:  Etiology of kidney " failure: polycystic kidney disease (PKD)  Tx: DDKT  Transplant: 3/20/2014 (Kidney)  Donor Type:  - Brain Death            Donor Class: Standard Criteria Donor  Significant changes in immunosuppression: None  Significant transplant-related complications: None       Transplant Office Phone Number: [phone]    Assessment and plan was discussed with the patient and he voiced his understanding and agreement.    Return Visit: 6 months       Elizabeth Palma has a clinical telephone visit for kidney transplant and immunosuppression management.    History of Present Illness    overall doing well but not exercising with the current COVID19 situation and her ankle     Recent Hospitalizations:  [x] No [] Yes    New Medical Issues: [x] No [] Yes    Decreased energy: [x] No [] Yes    Chest pain or SOB with exertion:  [x] No [] Yes    Appetite change or weight change: [] No [] Yes Gained weight    Nausea, vomiting or diarrhea:  [x] No [] Yes    Fever, sweats or chills: [x] No [] Yes    Leg swelling: [x] No [] Yes      Home BP: controlled     Review of Systems   A comprehensive review of systems was obtained and negative, except as noted in the HPI or PMH.    I have reviewed and updated the patient's Past Medical History, Social History, and Medication List.    Active Medical Problems  Patient Active Problem List   Diagnosis     Hypertension     Hyperlipidemia     Abnormal MRI, cervical spine     Sensory loss     Premature menopause age 37     OP (osteoporosis) T score -3.8     Major depressive disorder, recurrent episode, moderate (H)     Generalized anxiety disorder     CMC DJD(carpometacarpal degenerative joint disease), localized primary     Pain in joint, forearm -- L unhealed Fx     -donor kidney transplant     Immunosuppressed status (H)     Hyperparathyroidism, secondary (H)     Hyperparathyroidism (H)     Senile osteoporosis     Pain in joint involving ankle and foot     Nightmares associated with  chronic post-traumatic stress disorder     Type 2 diabetes mellitus with peripheral neuropathy (H)     Posttraumatic stress disorder     Right knee pain     Left knee pain     Age-related osteoporosis without current pathological fracture     Narcissistic personality disorder (H)     Personal history of other drug therapy     Median sensory neuropathy, left     Marital conflict     Suicidal ideation     Autosomal dominant polycystic kidney disease     Secondary hyperparathyroidism (H)     Anemia, iron deficiency     Allergies  Percocet [oxycodone-acetaminophen] and Novocain [procaine hcl]    Medications  Current Outpatient Medications   Medication Sig     acetaminophen (TYLENOL) 325 MG tablet Take 325-650 mg by mouth as needed     acetylcysteine (N-ACETYL-L-CYSTEINE) 600 MG CAPS capsule Take 2 400 mg caps two times daily for total daily dose of 800 mg     albuterol (PROAIR HFA/PROVENTIL HFA/VENTOLIN HFA) 108 (90 Base) MCG/ACT inhaler Inhale 2 puffs into the lungs every 6 hours as needed for shortness of breath / dyspnea or wheezing     ARIPiprazole (ABILIFY) 2 MG tablet Take 1 tablet (2 mg) by mouth daily     aspirin EC 81 MG EC tablet Take 1 tablet (81 mg) by mouth daily     blood glucose monitoring (ACCU-CHEK RALPH PLUS) meter device kit      blood glucose monitoring (NO BRAND SPECIFIED) meter device kit Use to test blood sugar 2 times daily or as directed. (Patient not taking: Reported on 2/3/2020)     blood glucose monitoring (NO BRAND SPECIFIED) test strip Use to test blood sugars 2 times daily or as directed (Patient not taking: Reported on 2/3/2020)     blood glucose monitoring (SOFTCLIX) lancets Use to test blood sugar 2 times daily or as directed. (Patient not taking: Reported on 2/3/2020)     Cholecalciferol (VITAMIN D) 1000 UNITS capsule 1,000 Units      cyanocolbalamin (VITAMIN  B-12) 1000 MCG tablet Take 1 tablet by mouth daily. (Patient not taking: Reported on 2/17/2020)     cycloSPORINE modified  (GENERIC EQUIVALENT) 25 MG capsule Take 5 capsules (125 mg) by mouth 2 times daily TAKE 5 CAPSULES (125MG) BY MOUTH TWO TIMES A DAY     cycloSPORINE modified (GENERIC EQUIVALENT) 25 MG capsule Take 5 capsules (125 mg) by mouth 2 times daily     econazole nitrate 1 % cream Apply topically daily     estradiol (VAGIFEM) 10 MCG TABS vaginal tablet Place 1 tablet (10 mcg) vaginally twice a week     ferrous sulfate (FEROSUL) 325 (65 Fe) MG tablet Take 1 tablet (325 mg) by mouth daily (with breakfast)     fluticasone (FLONASE) 50 MCG/ACT nasal spray Spray 1 spray into both nostrils daily     furosemide (LASIX) 20 MG tablet Take 1 tablet (20 mg) by mouth daily     gabapentin 300 MG PO capsule Take 2 capsules (600 mg) by mouth At Bedtime     latanoprost (XALATAN) 0.005 % ophthalmic solution Place 1 drop into both eyes At Bedtime     metFORMIN (GLUCOPHAGE-XR) 500 MG 24 hr tablet Take 3 tablets (1,500 mg) by mouth daily (with dinner)     mupirocin (BACTROBAN) 2 % external ointment Apply topically 3 times daily (Patient not taking: Reported on 2/3/2020)     mycophenolate (GENERIC EQUIVALENT) 250 MG capsule Take 4 capsules (1,000 mg) by mouth 2 times daily     omeprazole (PRILOSEC) 40 MG DR capsule Take 1 capsule (40 mg) by mouth daily     ondansetron (ZOFRAN-ODT) 4 MG ODT tab Take 1 tablet (4 mg) by mouth every 6 hours as needed     order for DME Equipment being ordered: [facility] [place]  [id number]   Ankle support, , fig 8, lace up     order for DME Walker with front wheels and a seat.     Polyvinyl Alcohol-Povidone (REFRESH OP) Apply to eye as needed Both eyes     prazosin (MINIPRESS) 2 MG capsule Take 2mg cap + 3 x 5mg caps (15mg) at bedtime (total dose=17mg)     prazosin (MINIPRESS) 5 MG capsule Take 3 x 5mg (15mg) caps + 1 x 2mg caps at bedtime (total dose=17mg)     simvastatin (ZOCOR) 20 MG tablet Take 1 tablet (20 mg) by mouth At Bedtime     sulfamethoxazole-trimethoprim (BACTRIM/SEPTRA) 400-80 MG tablet Take 1 tablet by mouth  daily     topiramate (TOPAMAX) 200 MG tablet Take 1 tablet (200 mg) by mouth 2 times daily     Vaginal Lubricant (REPLENS) GEL Use vaginally as needed. Can use up to 3 times per week.     vilazodone (VIIBRYD) 40 MG TABS tablet Take 1 tablet (40 mg) by mouth daily     No current facility-administered medications for this visit.        Phone call contact time:  Call Started at 140pm  Call Ended at 155 pm    Christian Jimenez MD

## 2020-03-26 ENCOUNTER — VIRTUAL VISIT (OUTPATIENT)
Dept: PSYCHIATRY | Facility: CLINIC | Age: 65
End: 2020-03-26
Payer: MEDICARE

## 2020-03-26 DIAGNOSIS — F43.12 NIGHTMARES ASSOCIATED WITH CHRONIC POST-TRAUMATIC STRESS DISORDER: ICD-10-CM

## 2020-03-26 DIAGNOSIS — F51.5 NIGHTMARES ASSOCIATED WITH CHRONIC POST-TRAUMATIC STRESS DISORDER: ICD-10-CM

## 2020-03-26 DIAGNOSIS — F33.1 MAJOR DEPRESSIVE DISORDER, RECURRENT EPISODE, MODERATE (H): ICD-10-CM

## 2020-03-26 RX ORDER — VILAZODONE HYDROCHLORIDE 40 MG/1
40 TABLET ORAL DAILY
Qty: 90 TABLET | Refills: 1 | Status: SHIPPED | OUTPATIENT
Start: 2020-03-26 | End: 2020-06-25

## 2020-03-26 RX ORDER — ARIPIPRAZOLE 2 MG/1
2 TABLET ORAL DAILY
Qty: 90 TABLET | Refills: 1 | Status: SHIPPED | OUTPATIENT
Start: 2020-03-26 | End: 2020-06-25

## 2020-03-26 RX ORDER — PRAZOSIN HYDROCHLORIDE 2 MG/1
CAPSULE ORAL
Qty: 90 CAPSULE | Refills: 1 | Status: SHIPPED | OUTPATIENT
Start: 2020-03-26 | End: 2020-06-25

## 2020-03-26 RX ORDER — PRAZOSIN HYDROCHLORIDE 5 MG/1
CAPSULE ORAL
Qty: 270 CAPSULE | Refills: 1 | Status: SHIPPED | OUTPATIENT
Start: 2020-03-26 | End: 2020-06-25
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
## 2020-03-26 NOTE — PROGRESS NOTES
"Elizabeth Palma is a 64 year old female who is being evaluated via a billable telephone visit.      The patient has been notified of following:     \"This telephone visit will be conducted via a call between you and your physician/provider. We have found that certain health care needs can be provided without the need for a physical exam.  This service lets us provide the care you need with a short phone conversation.  If a prescription is necessary we can send it directly to your pharmacy.  If lab work is needed we can place an order for that and you can then stop by our lab to have the test done at a later time.    If during the course of the call the physician/provider feels a telephone visit is not appropriate, you will not be charged for this service.\"     Elizabeth Palma complains of    Chief Complaint   Patient presents with     Follow Up       I have reviewed and updated the patient's Past Medical History, Social History, Family History and Medication List.    Nita Garcias, CMA    ALLERGIES  Percocet [oxycodone-acetaminophen] and Novocain [procaine hcl]    Additional provider notes: See note from today (03/26/2020)    Assessment/Plan:      Phone call duration: 21 minutes    Chaparrita Hatch MD  "

## 2020-04-05 DIAGNOSIS — E66.01 MORBID OBESITY DUE TO EXCESS CALORIES (H): ICD-10-CM

## 2020-04-08 NOTE — TELEPHONE ENCOUNTER
topiramate (TOPAMAX) 200 MG      Last Written Prescription Date:  10/9/19  Last Fill Quantity: 60,   # refills: 5  Last Office Visit : 10/7/19  Future Office visit:  NONE    Routing refill request to provider for review/approval because:  RF PENDING / NO APPT (WEIGHT)

## 2020-04-09 DIAGNOSIS — E66.01 MORBID OBESITY DUE TO EXCESS CALORIES (H): ICD-10-CM

## 2020-04-09 RX ORDER — TOPIRAMATE 200 MG/1
TABLET, FILM COATED ORAL
Qty: 60 TABLET | Refills: 5 | OUTPATIENT
Start: 2020-04-09

## 2020-04-09 NOTE — TELEPHONE ENCOUNTER
Letting you know that pt has an appt with Dr. Irene this Monday 4/13 morning.  Looking for Hussain refill      [phone]  **OK to leave detailed VM**

## 2020-04-09 NOTE — TELEPHONE ENCOUNTER
topiramate (TOPAMAX) 200 MG tablet      Last Written Prescription Date:  10-9-19  Last Fill Quantity: 10-7-19,   # refills: 60  Last Office Visit : 5  Future Office visit:  4-    Routing refill request to provider for review/approval because:  Pt was to follow up in 3 months - overdue.      Kathleen M Doege RN

## 2020-04-09 NOTE — TELEPHONE ENCOUNTER
Refill denied at this time. Patient needs appointment. [employer] message sent to patient to schedule virtual visit with Dr. Irene.

## 2020-04-10 RX ORDER — TOPIRAMATE 200 MG/1
200 TABLET, FILM COATED ORAL 2 TIMES DAILY
Qty: 60 TABLET | Refills: 0 | Status: SHIPPED | OUTPATIENT
Start: 2020-04-10 | End: 2020-04-13

## 2020-04-13 ENCOUNTER — VIRTUAL VISIT (OUTPATIENT)
Dept: ENDOCRINOLOGY | Facility: CLINIC | Age: 65
End: 2020-04-13
Payer: MEDICARE

## 2020-04-13 DIAGNOSIS — E66.01 MORBID OBESITY DUE TO EXCESS CALORIES (H): ICD-10-CM

## 2020-04-13 RX ORDER — TOPIRAMATE 200 MG/1
200 TABLET, FILM COATED ORAL 2 TIMES DAILY
Qty: 60 TABLET | Refills: 11 | Status: SHIPPED | OUTPATIENT
Start: 2020-04-13 | End: 2020-04-16

## 2020-04-13 NOTE — PROGRESS NOTES
Return Medical Weight Management Note     Elizabeth Palma  MRN:  [id number]  :  1955  AYSE:  20    Dear Dr. Sheridan,      I had the pleasure of seeing your patient Elizabeth Palma.  She is a 63 year old female who I am continuing to see for treatment of obesity related to: DM-2, Hyperlipidemia, Sleep Apnea (has CPAP and does not use), GERD and Depression. S/p gastric bypass surgery in 1990.     CURRENT WEIGHT:   0 lbs 0 oz  Self report 173    Wt Readings from Last 4 Encounters:   20 77.1 kg (170 lb)   20 77.8 kg (171 lb 9.6 oz)   20 76.2 kg (167 lb 14.4 oz)   02/10/20 75.8 kg (167 lb 1.6 oz)     Body Mass Index:  There is no height or weight on file to calculate BMI.  Vitals:  LMP  (LMP Unknown)     Initial consult weight was 214 on 2015.  Weight change since last seen on 10/17/19 is up 12 lbs.   Total loss is 41 pounds.    INTERVAL HISTORY:  Has gained weight since quarantined for last month. Back on topiramate 200 AM and 200 HS with no weight loss, stopped naltrexone as not helping. Remains off gabapentin, and pain is ok. Reports she has been struggling with frequent cravings for salty snacks like crackers, chips, and pretzels throughout the day, but primarily at night.  Denies any side effects. She feels full quickly ever since gastric bypass surgery in .      Diet Recall Review with Patient 2018   Do you typically eat breakfast? No   Do you typically eat lunch? Yes   If you do eat lunch, what types of food do you typically eat?  Eggs   Do you typically eat supper? Yes   If you do eat supper, what types of food do you typically eat? Fish or other protein    Do you typically eat snacks? Yes   If you do snack, what types of food do you typically eat? Fruit    How many glasses of juice do you drink in a typical day? 0   How many of glasses of milk do you drink in a typical day? 0   How many 8oz glasses of sugar containing drinks such as Arnel-Aid/sweet tea do  you drink in a day? 0   How many cans/bottles of sugar pop/soda/tea/sports drinks do you drink in a day? 0   How many cans/bottles of diet pop/soda/tea or sports drink do you drink in a day? 0   How often do you have a drink of alcohol? Never     MEDICATIONS:   Current Outpatient Medications   Medication     acetaminophen (TYLENOL) 325 MG tablet     acetylcysteine (N-ACETYL-L-CYSTEINE) 600 MG CAPS capsule     albuterol (PROAIR HFA/PROVENTIL HFA/VENTOLIN HFA) 108 (90 Base) MCG/ACT inhaler     ARIPiprazole (ABILIFY) 2 MG tablet     aspirin EC 81 MG EC tablet     blood glucose monitoring (ACCU-CHEK RALPH PLUS) meter device kit     blood glucose monitoring (NO BRAND SPECIFIED) meter device kit     blood glucose monitoring (NO BRAND SPECIFIED) test strip     blood glucose monitoring (SOFTCLIX) lancets     Cholecalciferol (VITAMIN D) 1000 UNITS capsule     cyanocolbalamin (VITAMIN  B-12) 1000 MCG tablet     cycloSPORINE modified (GENERIC EQUIVALENT) 25 MG capsule     cycloSPORINE modified (GENERIC EQUIVALENT) 25 MG capsule     econazole nitrate 1 % cream     estradiol (VAGIFEM) 10 MCG TABS vaginal tablet     ferrous sulfate (FEROSUL) 325 (65 Fe) MG tablet     fluticasone (FLONASE) 50 MCG/ACT nasal spray     furosemide (LASIX) 20 MG tablet     gabapentin 300 MG PO capsule     latanoprost (XALATAN) 0.005 % ophthalmic solution     metFORMIN (GLUCOPHAGE-XR) 500 MG 24 hr tablet     mupirocin (BACTROBAN) 2 % external ointment     mycophenolate (GENERIC EQUIVALENT) 250 MG capsule     omeprazole (PRILOSEC) 40 MG DR capsule     ondansetron (ZOFRAN-ODT) 4 MG ODT tab     order for DME     order for DME     Polyvinyl Alcohol-Povidone (REFRESH OP)     prazosin (MINIPRESS) 2 MG capsule     prazosin (MINIPRESS) 5 MG capsule     simvastatin (ZOCOR) 20 MG tablet     sulfamethoxazole-trimethoprim (BACTRIM/SEPTRA) 400-80 MG tablet     topiramate (TOPAMAX) 200 MG tablet     Vaginal Lubricant (REPLENS) GEL     vilazodone (VIIBRYD) 40 MG TABS  "tablet     No current facility-administered medications for this visit.      Weight Loss Medication History Reviewed With Patient 10/7/2019   Which weight loss medications are you currently taking on a regular basis?  Topamax (topiramate)   Are you having any side effects from the weight loss medication that we have prescribed you? No   If you are having side effects please describe: -     ASSESSMENT:   Stopped phentermine due to balance problems but has maintained topiramate 200 BID.  Topiramate level was very generous on her intended dose.Advised she remove snacks from the home. Remains off gabapentin with acceptable neuropathy pain control.     FOLLOW-UP:    3-4 months  The patient is being evaluated via a billable telephone visit and has been notified of following:     \"This telephone visit will be conducted via a call between you and your physician/provider. We have found that certain health care needs can be provided without the need for a physical exam.  This service lets us provide the care you need with a short phone conversation.  If a prescription is necessary we can send it directly to your pharmacy.  If lab work is needed we can place an order for that and you can then stop by our lab to have the test done at a later time.    Telephone visits are billed at different rates depending on your insurance coverage. During this emergency period, for some insurers they may be billed the same as an in-person visit.  Please reach out to your insurance provider with any questions.    If during the course of the call the physician/provider feels a telephone visit is not appropriate, you will not be charged for this service.\"    Patient has given verbal consent for Telephone visit?  Yes    How would you like to obtain your ALEXIAS? Caty    Phone call duration: 15 minutes.    "

## 2020-04-16 RX ORDER — TOPIRAMATE 200 MG/1
200 TABLET, FILM COATED ORAL 2 TIMES DAILY
Qty: 180 TABLET | Refills: 3 | Status: SHIPPED | OUTPATIENT
Start: 2020-04-16 | End: 2021-04-07

## 2020-04-16 NOTE — TELEPHONE ENCOUNTER
TOPIRAMATE 200MG TABLETS   Last Written Prescription Date:   4/13/20  Last Fill Quantity:  60 ,   # refills:11  Last Office Visit : 4/13/20 VIRTUAL  Future Office visit:   None       Would like 90 day supply. Written for one year supply on 4/13/20, may change to 90 day / 3 rfl.

## 2020-04-17 ENCOUNTER — TELEPHONE (OUTPATIENT)
Dept: DERMATOLOGY | Facility: CLINIC | Age: 65
End: 2020-04-17

## 2020-04-17 NOTE — TELEPHONE ENCOUNTER
"Teledermatology Nurse Call for RETURN patients seen within the last 3 years:    The patient was contacted by phone and we reviewed, \"Due to the coronavirus pandemic, we are calling to review your visit and offer you a teledermatology visit where you send in photos via [employer]. These photos will be seen by an MD or ASHU. This will be billed to you and your insurance.\"  The patient was also told that \"a teledermatology visit is not as thorough as an in-person visit and that the quality of the photograph sent may not be of the same quality as that taken by the dermatology clinic, but the patient would like to proceed with an teledermatology because of National Emergency Regarding Coronavirus disease (COVID 19) Outbreak.\"  \"If a prescription is necessary we can send it directly to your pharmacy.  If lab work is needed we can place an order for that and you can then stop by our lab to have the test done at a later time.\"    The patient understood that they may receive a call from the clinic to review additional history, may still be instructed to come to clinic even after photo review and be billed for both visits with an MD. They were told that a photo assessment does not replace an in person skin exam. The patient understood that teledermatology is not for urgent issues and would require up to 3 business days for review. The patient denied skin pain, fever, mucosal symptoms (lesions, blisters, sores in the mouth, nose, eyes, or genitals)  IF PATIENT ENDORSES ANY OF THESE STOP AND PAGE  ON CALL ATTENDING. IF OTHER POSSIBLY URGENT SYMPTOMS THEN PAGE PHYSICIAN YOU ARE SCHEDULING WITH OR ON CALL IF NO ANSWER.       The patient chose to:                                                                                                                                                                                                                    Decline teledermatology visit with [employer] photos and instead rescheduled their " in-person visit.                                                                                                                                                 RADHA Christy   Dermatology

## 2020-04-27 DIAGNOSIS — K21.9 GASTROESOPHAGEAL REFLUX DISEASE WITHOUT ESOPHAGITIS: ICD-10-CM

## 2020-04-27 NOTE — TELEPHONE ENCOUNTER
[facility] Call Center    Phone Message    May a detailed message be left on voicemail: yes     Reason for Call: Medication Refill Request    Has the patient contacted the pharmacy for the refill? Yes   Name of medication being requested: omeprazole (PRILOSEC) 40 MG DR capsule   Provider who prescribed the medication: Dr Sheridan  [facility]acy[facility] [address] in [place]  Date medication is needed: ASAP          Action Taken: Message routed to:  [facility]    Travel Screening: Not Applicable

## 2020-04-28 RX ORDER — OMEPRAZOLE 40 MG/1
40 CAPSULE, DELAYED RELEASE ORAL DAILY
Qty: 90 CAPSULE | Refills: 3 | Status: SHIPPED | OUTPATIENT
Start: 2020-04-28 | End: 2021-04-09

## 2020-04-28 NOTE — TELEPHONE ENCOUNTER
" omeprazole (PRILOSEC) 40 MG DR capsule      Last Written Prescription Date:  4/19/2019  Last Fill Quantity: 90,   # refills: 3  Last Office Visit : 2/17/20  Future Office visit:  None    Routing refill request to provider for review/approval because:     DX Osteoporosis on record  . MyChart\"Your refill request has been entered and sent to your physician for authorization. Please call your pharmacy prior to going in to pick it up.\"    RX sent to pt's pharmacy.  Umm Vieira RN 10:23 AM on 4/28/2020.                 "

## 2020-05-01 ENCOUNTER — TELEPHONE (OUTPATIENT)
Dept: ENDOCRINOLOGY | Facility: CLINIC | Age: 65
End: 2020-05-01

## 2020-05-04 DIAGNOSIS — E61.1 IRON DEFICIENCY: ICD-10-CM

## 2020-05-04 NOTE — TELEPHONE ENCOUNTER
[facility] Call Center    Phone Message    May a detailed message be left on voicemail: yes     Reason for Call: Medication Refill Request    Has the patient contacted the pharmacy for the refill? Yes   Name of medication being requested:ferrous sulfate (FEROSUL) 325 (65 Fe) MG tablet   Provider who prescribed the medication:    Yogesh Santacruz     Pharmacy: [id number] - [place], [place] - [address] [facility] [address]   Date medication is needed: 5/4/2020, Patient is needing ASAP         Action Taken: Message routed to:  [facility]    Travel Screening: Not Applicable

## 2020-05-05 RX ORDER — FERROUS SULFATE 325(65) MG
325 TABLET ORAL
Qty: 100 TABLET | Refills: 0 | Status: SHIPPED | OUTPATIENT
Start: 2020-05-05

## 2020-05-11 ENCOUNTER — VIRTUAL VISIT (OUTPATIENT)
Dept: SURGERY | Facility: CLINIC | Age: 65
End: 2020-05-11
Payer: MEDICARE

## 2020-05-11 DIAGNOSIS — Z71.3 NUTRITIONAL COUNSELING: ICD-10-CM

## 2020-05-11 DIAGNOSIS — Z98.84 HX OF GASTRIC BYPASS: Primary | ICD-10-CM

## 2020-05-11 DIAGNOSIS — E11.42 TYPE 2 DIABETES MELLITUS WITH PERIPHERAL NEUROPATHY (H): ICD-10-CM

## 2020-05-11 DIAGNOSIS — R60.1 GENERALIZED EDEMA: ICD-10-CM

## 2020-05-11 RX ORDER — FUROSEMIDE 20 MG
20 TABLET ORAL DAILY
Qty: 90 TABLET | Refills: 3 | Status: SHIPPED | OUTPATIENT
Start: 2020-05-11 | End: 2021-03-23

## 2020-05-11 NOTE — PROGRESS NOTES
"TELEPHONE VISIT: COVID19  The patient has been notified of the following:   \"This telephone visit will be conducted via a call between you and your provider. We have found that certain health care needs can be provided without a physical exam. This evaluation will be a billable telephone visit.\"    Nutrition Reassessment  Reason For Visit:  Elizabeth Palma is a 64 year-old female presenting today for nutrition follow-up, s/p \"gastric bypass in 1990 at [facility]\" per Pt report. She is seeing medical weight management.  She was referred by Dr. Irene.  Note: Pt had a kidney transplant on March 20, 2014.    Pt was accompanied by her  Rahat.     Pt signed Medicare ABN form which is good for 1 year (10/28/19-10/28/20).    Patient with Co-morbidities of obesity including:  Type II DM yes  Renal Failure no  Sleep apnea yes  Hypertension no   Dyslipidemia yes  Joint pain no  Back pain no  GERD yes     Anthropometrics:  Height: 61\"  Pre-op Weight: 265 lbs per Pt report  (Pt reported that she initially was at 215 lbs in 2015)  Home: 178.4 lb (per pt's report)    Current Vitamins/Minerals: 3000 International Units vitamin D/day, Calcium citrate chewy 500 mg with vitamin 500 mg 1x daily, vitamin C, vitamin B12 every other day per PCP, B-complex, iron  *Pt stated that she was told not to take other vitamins/minerals by MD. Pt reports that she is only taking one calcium because she was told her calcium was high, recent calcium in 4/2019 calcium was normal.      3/27/19: Started Naltrexone at most recent MD appointment  4/30/19: patient reported that she is more hungry and is eating larger portions since medication change  5/28/19: taking Topiramate and Naltrexone   11:26: Continues topiramate and Naltrexone. Pt states it took a couple months before she felt a benefit from Naltrexone.   1/2/20: She is having more leg cramps recently at night. Recent labs show she is low in iron. Taking iron per her PCP.  2/10/20: Leg " cramps are improving per her report. She's had 3 iron infusions in the past month.    Nutrition History:  Per previous RD visits:  - Lactose intolerance ever since bariatric surgery.  - Pt stated that she has osteoporosis; however, she stated that her doctors don't want her to take any vitamins or minerals due to her kidney transplant.  - Pt states she does not like the taste of protein drinks (shakes and clear liquids), yogurt, beans/lentils or any of the high protein products we have in clinic. Pt reports that she does not like meat, and does not eat a lot of it.   - Pt and  follow a Kosher diet   - Pt also stated on previous visits that she will not record food intake due to the fact that every time she does this, she simply does not eat as she doesn't want to record.  She stated that her therapist strongly advises against recording food intake for this reason.   - Cannot eat bone broth because it is not kosher.    (3/9/20):  - Using Built Bar to get some more protein in (~15 g pro per bar)  - Skipping meals and not eating much protein  - Still feeling tired, depression has been bad lately, seeing a therapist to help with this  - No appetite or motivation to eat. Feels weak (likely r/t not eating much throughout the day)  - Pretzels and dip, chips, soup, cookies - high CHO snacks/meals    (5/11/2020):   - She notes she has gained weight and has been getting up in the middle of the night to eat.   - She continues to work with her therapist on eating in the middle of the night and fear of eating too much  - She reports she does not eat much during the day, often forgets to eat meals until her  reminds her to eat   - She eats a high CHO diet, and does not like a lot of protein foods, and she is finding it difficult to find kosher product when she orders her groceries  - She continues to order out frequently   - Pt reports she is vegetarian because meat does not sit well in her stomach, and then reports  "having some steak for Easter.     Physical Activity Note: Started using the 60up machine, pt states she is feeling stronger but by the end of the day she feels weak and does not want to walk because she feels too weak.     Recent Diet Recall:  Breakfast: skip  Lunch: cookie and hot dog Or egg fuyoung   Dinner: tuna or mac cheese   Snacks: \"A lot less snacking\" 4x/day - eating pea snacks (5-6 servings over 3 days); mini pretzels (4-5 per serving); 1/2 apples; fruit compote, peanuts, 13 g protein popcorn, built bars  Beverages: water, Propel (4 x 16 oz); iced tea (with artifical sweetner), hot tea  Dining Out: 3 meals/week       Progress Towards Previous Goals:  Goals:   1. Eat 3 meals with lean protein at each meal, along with a non-starchy vegetable or whole fruit, up to 1/2 c carb at a meal. Plan every morning what you plan to eat for breakfast, lunch, and dinner for that day.-Not Met    Breakfast: [employer] shake + fruit   Lunch: protein + fruit or vegetable   Dinner: protein + fruit or vegetable  2. If needing a snack, have vegetables or protein snack. Limit 3x/day. -Not Met    3. Walk 15 min daily, increasing as able. (Starting physical therapy)-Not Met   4. Continue working on Meal planning for all meals. Consider buying a non-fat cottage cheese, and/or eggs instead of bagel/grits/toast for breakfast. -Not Met   5. Drink 48-64 ounces of water/fluids a day.-Met    Tips:-Continues   - Try using unflavored, unsweetened almond milk + vanilla extract (lower in sugar than the vanilla almond milk)  - Try adding 1/2 banana to protein shake to help with cramping  - Could check out [employer] for other protein powders    Nutrition Prescription:   Grams Protein: 60 (minimum) - Not meeting  Amount of Fluid: 48-64 oz    Nutrition Diagnosis  Current: Overweight related to history of excessive energy intake, variable eating habits/intake and lack of sufficient exercise as evidenced by pt report and BMI > 25. - " Continues    Intervention  1. Reviewed and modified goals   2. Discussed concern that she is not eating enough throughout the day, which can be contributing to her weakness and her eating in the middle of the night. Recommended eating 3 meals daily with a protein source at every meal. Will perform NFPE next time pt is in clinic  3. Dicussed high protein food and encouraged pt to try meat/fish every once in awhile since she tolerated the steak.   4. Discussed vitamin and mineral recommendations.  5. AVS via [email or web address]     Patient Understanding: good  Expected Compliance: good    Goals:   1. Eat 3 meals with lean protein at each meal, along with a non-starchy vegetable or whole fruit, up to 1/2 c carb at a meal. Plan every morning what you plan to eat for breakfast, lunch, and dinner for that day.   Breakfast: built bar + fruit   Lunch: protein  + fruit or vegetable   Dinner: protein + fruit or vegetable  2. If needing a snack, have vegetables or protein snack. Limit 3x/day.    3. Walk/ use 60 up machine 15 min daily, increasing as able.   4. Have a list of high protein food on the refrigerator to help remind you to grab a protein while eating, Continue working on Meal planning for all meals. Consider buying a non-fat cottage cheese, and/or eggs.  5. Drink 48-64 ounces of water/fluids a day.    Tips:  - Try using unflavored, unsweetened almond milk + vanilla extract (lower in sugar than the vanilla almond milk)  - Could check out [employer] for other protein powders    Follow-Up: 1 month or PRN     Phone call contact time:   Call Started at: 9:36 am  Call Ended at: 9:56 am    Time spent with patient: 20 minutes  Carrie Chawla, MS, RD, LD

## 2020-05-11 NOTE — TELEPHONE ENCOUNTER
[facility] Call Center    Phone Message    May a detailed message be left on voicemail: yes     Reason for Call: Medication Refill Request    Has the patient contacted the pharmacy for the refill? Yes   Name of medication being requested: furosemide (LASIX) 20 MG tablet   Provider who prescribed the medication: Dr Sheridan  Pharmacy: [id number] - [place], [place] - [address] AT [facility] [address]   Date medication is needed: ASAP  - she has enough through Friday         Action Taken: Message routed to:  [facility]    Travel Screening: Not Applicable

## 2020-05-11 NOTE — PATIENT INSTRUCTIONS
Goals:   1. Eat 3 meals with lean protein at each meal, along with a non-starchy vegetable or whole fruit, up to 1/2 c carb at a meal. Plan every morning what you plan to eat for breakfast, lunch, and dinner for that day.   Breakfast: built bar + fruit   Lunch: protein  + fruit or vegetable   Dinner: protein + fruit or vegetable  2. If needing a snack, have vegetables or protein snack. Limit 3x/day.    3. Walk/ use 60 up machine 15 min daily, increasing as able.   4. Have a list of high protein food on the refrigerator to help remind you to grab a protein while eating, Continue working on Meal planning for all meals. Consider buying a non-fat cottage cheese, and/or eggs.  5. Drink 48-64 ounces of water/fluids a day.    Tips:  - Try using unflavored, unsweetened almond milk + vanilla extract (lower in sugar than the vanilla almond milk)  - Could check out [employer] for other protein powders    Follow up with RD in one month    Carrie Chawla, MS, RD, LD  If you need to schedule or reschedule with a dietitian please call [phone].

## 2020-05-18 DIAGNOSIS — Z94.0 KIDNEY REPLACED BY TRANSPLANT: Primary | ICD-10-CM

## 2020-05-18 RX ORDER — CYCLOSPORINE 25 MG/1
125 CAPSULE, LIQUID FILLED ORAL 2 TIMES DAILY
Qty: 300 CAPSULE | Refills: 11 | Status: SHIPPED | OUTPATIENT
Start: 2020-05-18 | End: 2021-04-19

## 2020-05-19 NOTE — TELEPHONE ENCOUNTER
Medication/Refill approved per CPA:    [facility] COLLAB[facility]ATIVE AGREEMENT FOR  IMMUNOSUPPRESSENT PRESCRIPTION MODIFICATION.      Routing encounter to Transplant as an FYI.    Thanks,  Nayana Hayden, PharmD  Specialty Pharmacist/Transplant  [facility]  [phone]

## 2020-05-29 ENCOUNTER — TELEPHONE (OUTPATIENT)
Dept: ENDOCRINOLOGY | Facility: CLINIC | Age: 65
End: 2020-05-29

## 2020-05-29 DIAGNOSIS — E11.42 TYPE 2 DIABETES MELLITUS WITH DIABETIC POLYNEUROPATHY, WITHOUT LONG-TERM CURRENT USE OF INSULIN (H): ICD-10-CM

## 2020-05-29 RX ORDER — METFORMIN HCL 500 MG
1500 TABLET, EXTENDED RELEASE 24 HR ORAL
Qty: 270 TABLET | Refills: 2 | Status: SHIPPED | OUTPATIENT
Start: 2020-05-29 | End: 2021-02-17

## 2020-05-29 NOTE — TELEPHONE ENCOUNTER
Last Clinic Visit: 2/17/2020  [facility]  Refill sent to requested pharmacy. Pharmacy is closed today due to protests.  is aware and pt has enough medication to mid next week.

## 2020-05-29 NOTE — TELEPHONE ENCOUNTER
[facility] Call Center    Phone Message    May a detailed message be left on voicemail: yes     Reason for Call: Medication Refill Request    Has the patient contacted the pharmacy for the refill? Yes   Name of medication being requested: metFORMIN (GLUCOPHAGE-XR) 500 MG 24 hr tablet  Provider who prescribed the medication:   [facility]  Date medication is needed: asap     -sending to clinic for change in pharmacy thanks      Action Taken: Message routed to:  [facility]    Travel Screening: Not Applicable

## 2020-06-08 ENCOUNTER — VIRTUAL VISIT (OUTPATIENT)
Dept: SURGERY | Facility: CLINIC | Age: 65
End: 2020-06-08
Payer: MEDICARE

## 2020-06-08 ENCOUNTER — TELEPHONE (OUTPATIENT)
Dept: OPHTHALMOLOGY | Facility: CLINIC | Age: 65
End: 2020-06-08

## 2020-06-08 DIAGNOSIS — F51.5 NIGHTMARES ASSOCIATED WITH CHRONIC POST-TRAUMATIC STRESS DISORDER: ICD-10-CM

## 2020-06-08 DIAGNOSIS — Z71.3 NUTRITIONAL COUNSELING: ICD-10-CM

## 2020-06-08 DIAGNOSIS — F43.12 NIGHTMARES ASSOCIATED WITH CHRONIC POST-TRAUMATIC STRESS DISORDER: ICD-10-CM

## 2020-06-08 DIAGNOSIS — Z98.84 HX OF GASTRIC BYPASS: Primary | ICD-10-CM

## 2020-06-08 DIAGNOSIS — E11.42 TYPE 2 DIABETES MELLITUS WITH PERIPHERAL NEUROPATHY (H): ICD-10-CM

## 2020-06-08 NOTE — PROGRESS NOTES
"TELEPHONE VISIT: COVID19  The patient has been notified of the following:   \"This telephone visit will be conducted via a call between you and your provider. We have found that certain health care needs can be provided without a physical exam. This evaluation will be a billable telephone visit.\"    Nutrition Reassessment  Reason For Visit:  Elizabeth Palma is a 64 year-old female presenting today for nutrition follow-up, s/p \"gastric bypass in 1990 at [facility]\" per Pt report. She is seeing medical weight management.  She was referred by Dr. Irene.  Note: Pt had a kidney transplant on March 20, 2014.    Pt was accompanied by her  Rahat.     Pt signed Medicare ABN form which is good for 1 year (10/28/19-10/28/20).    Patient with Co-morbidities of obesity including:  Type II DM yes  Renal Failure no  Sleep apnea yes  Hypertension no   Dyslipidemia yes  Joint pain no  Back pain no  GERD yes     Anthropometrics:  Height: 61\"  Pre-op Weight: 265 lbs per Pt report  (Pt reported that she initially was at 215 lbs in 2015)  Home: 177.4 (per pt's report)    Current Vitamins/Minerals: 3000 International Units vitamin D/day, Calcium citrate chewy 500 mg with vitamin 500 mg 1x daily, vitamin C, vitamin B12 every other day per PCP, B-complex, iron, multivitamin   *Reviewed vitamin and mineral supplement with pt today she continue to emphasize that she was told she can only have calcium 1x daily, recent calcium in 4/2019 calcium was normal.      3/27/19: Started Naltrexone at most recent MD appointment  4/30/19: patient reported that she is more hungry and is eating larger portions since medication change  5/28/19: taking Topiramate and Naltrexone   11:26: Continues topiramate and Naltrexone. Pt states it took a couple months before she felt a benefit from Naltrexone.   1/2/20: She is having more leg cramps recently at night. Recent labs show she is low in iron. Taking iron per her PCP.  2/10/20: Leg cramps are " improving per her report. She's had 3 iron infusions in the past month.    Nutrition History:  Per previous RD visits:  - Lactose intolerance ever since bariatric surgery.  - Pt stated that she has osteoporosis; however, she stated that her doctors don't want her to take any vitamins or minerals due to her kidney transplant.  - Pt states she does not like the taste of protein drinks (shakes and clear liquids), yogurt, beans/lentils or any of the high protein products we have in clinic. Pt reports that she does not like meat, and does not eat a lot of it.   - Pt and  follow a Kosher diet   - Pt also stated on previous visits that she will not record food intake due to the fact that every time she does this, she simply does not eat as she doesn't want to record.  She stated that her therapist strongly advises against recording food intake for this reason.   - Cannot eat bone broth because it is not kosher.    (5/11/2020):   - She notes she has gained weight and has been getting up in the middle of the night to eat.   - She continues to work with her therapist on eating in the middle of the night and fear of eating too much  - She reports she does not eat much during the day, often forgets to eat meals until her  reminds her to eat   - She eats a high CHO diet, and does not like a lot of protein foods, and she is finding it difficult to find kosher product when she orders her groceries  - She continues to order out frequently   - Pt reports she is vegetarian because meat does not sit well in her stomach, and then reports having some steak for Easter.     6/8/2020:   - Pt reports she has not been feeling well this morning and is not planning on having breakfast.   - She continues to consume a high CHO diet. She is trying to limit her snacking on crackers and is having more vegetable cut up   - She is not doing any physical activity, she was previously using a 60 up machine and feeling stronger but she  stopped doing extra movement and she is unsure why she did not continue  - Pt reports she will eat a lot of packaged foods    Diet recall:   B: Bowl of cereal with lots fruit  L: Several bites of the impossible burger from [employer], a lot of vegetables OR ramen but will not eat the whole serving. Eating a lot of packaged stuff. Sometimes a sandwich   D: Stir goss: with chicken with vegetables   Snack: 1/2 of the week will get up in the middle of the night and snack mainly on apple sauce or crackers or vegetables   Beverages: drinking water and propel       Progress Towards Previous Goals:  Goals:   1. Eat 3 meals with lean protein at each meal, along with a non-starchy vegetable or whole fruit, up to 1/2 c carb at a meal. Plan every morning what you plan to eat for breakfast, lunch, and dinner for that day.-Not Met    Breakfast: [employer] shake + fruit   Lunch: protein + fruit or vegetable   Dinner: protein + fruit or vegetable  2. If needing a snack, have vegetables or protein snack. Limit 3x/day. -Improving   3.  Walk/ use 60 up machine 15 min daily, increasing as able-Not Met   4. Continue working on Meal planning for all meals. Consider buying a non-fat cottage cheese, and/or eggs instead of bagel/grits/toast for breakfast. -Not Met   5. Drink 48-64 ounces of water/fluids a day.-Met    Nutrition Prescription:   Grams Protein: 60 (minimum) - Not meeting  Amount of Fluid: 48-64 oz    Nutrition Diagnosis  Current: Overweight related to history of excessive energy intake, variable eating habits/intake and lack of sufficient exercise as evidenced by pt report and BMI > 25. - Continues    Intervention  1. Reviewed and modified goals   2. Discussed concern that she is not eating enough throughout the day, which can be contributing to her weakness and her eating in the middle of the night. Recommended eating 3 meals daily with a protein source at every meal. Will perform NFPE next time pt is in clinic  3. Reviewed  high protein foods, and encouraged a higher protein food with breakfast   4. Reviewed vitamin and mineral recommendations.  5. AVS via [employer]     Patient Understanding: good  Expected Compliance: good    Goals:   1. Eat 3 meals with lean protein at each meal, along with a non-starchy vegetable or whole fruit, up to 1/2 c carb at a meal. Plan every morning what you plan to eat for breakfast, lunch, and dinner for that day.   Breakfast: built bar + fruit   Lunch: protein  + fruit or vegetable   Dinner: protein + fruit or vegetable  2. If needing a snack, have vegetables or protein snack. Limit 3x/day.    3. Walk/ use 60 up machine 15 min daily, increasing as able.   4. Have a list of high protein food on the refrigerator to help remind you to grab a protein while eating, Continue working on Meal planning for all meals. Consider buying a non-fat cottage cheese, and/or eggs.  5. Drink 48-64 ounces of water/fluids a day.    Follow-Up: 3-6 months     Phone call contact time:   Call Started at: 9:31 am  Call Ended at: 9:44 am    Time spent with patient: 13 minutes  Carrie Chawla, MS, RD, LD

## 2020-06-08 NOTE — PATIENT INSTRUCTIONS
Goals:   1. Eat 3 meals with lean protein at each meal, along with a non-starchy vegetable or whole fruit, up to 1/2 c carb at a meal. Plan every morning what you plan to eat for breakfast, lunch, and dinner for that day.   Breakfast: built bar + fruit   Lunch: protein  + fruit or vegetable   Dinner: protein + fruit or vegetable  2. If needing a snack, have vegetables or protein snack. Limit 3x/day.    3. Walk/ use 60 up machine 15 min daily, increasing as able.   4. Have a list of high protein food on the refrigerator to help remind you to grab a protein while eating, Continue working on Meal planning for all meals. Consider buying a non-fat cottage cheese, and/or eggs.  5. Drink 48-64 ounces of water/fluids a day.    Follow up with RD in 3-6 months    Carrie Chawla, MS, RD, LD  If you need to schedule or reschedule with a dietitian please call [phone].

## 2020-06-08 NOTE — LETTER
"6/8/2020       RE: Elizabeth Palma  [address]  [facility] [id number]     Dear Colleague,    Thank you for referring your patient, Elizabeth Palma, to the [facility] at [facility]. Please see a copy of my visit note below.    TELEPHONE VISIT: COVID19  The patient has been notified of the following:   \"This telephone visit will be conducted via a call between you and your provider. We have found that certain health care needs can be provided without a physical exam. This evaluation will be a billable telephone visit.\"    Nutrition Reassessment  Reason For Visit:  Elizabeth Palma is a 64 year-old female presenting today for nutrition follow-up, s/p \"gastric bypass in 1990 at [facility]\" per Pt report. She is seeing medical weight management.  She was referred by Dr. Irene.  Note: Pt had a kidney transplant on March 20, 2014.    Pt was accompanied by her  Rahat.     Pt signed Medicare ABN form which is good for 1 year (10/28/19-10/28/20).    Patient with Co-morbidities of obesity including:  Type II DM yes  Renal Failure no  Sleep apnea yes  Hypertension no   Dyslipidemia yes  Joint pain no  Back pain no  GERD yes     Anthropometrics:  Height: 61\"  Pre-op Weight: 265 lbs per Pt report  (Pt reported that she initially was at 215 lbs in 2015)  Home: 177.4 (per pt's report)    Current Vitamins/Minerals: 3000 International Units vitamin D/day, Calcium citrate chewy 500 mg with vitamin 500 mg 1x daily, vitamin C, vitamin B12 every other day per PCP, B-complex, iron, multivitamin   *Reviewed vitamin and mineral supplement with pt today she continue to emphasize that she was told she can only have calcium 1x daily, recent calcium in 4/2019 calcium was normal.      3/27/19: Started Naltrexone at most recent MD appointment  4/30/19: patient reported that she is more hungry and is eating larger portions since medication change  5/28/19: " taking Topiramate and Naltrexone   11:26: Continues topiramate and Naltrexone. Pt states it took a couple months before she felt a benefit from Naltrexone.   1/2/20: She is having more leg cramps recently at night. Recent labs show she is low in iron. Taking iron per her PCP.  2/10/20: Leg cramps are improving per her report. She's had 3 iron infusions in the past month.    Nutrition History:  Per previous RD visits:  - Lactose intolerance ever since bariatric surgery.  - Pt stated that she has osteoporosis; however, she stated that her doctors don't want her to take any vitamins or minerals due to her kidney transplant.  - Pt states she does not like the taste of protein drinks (shakes and clear liquids), yogurt, beans/lentils or any of the high protein products we have in clinic. Pt reports that she does not like meat, and does not eat a lot of it.   - Pt and  follow a Kosher diet   - Pt also stated on previous visits that she will not record food intake due to the fact that every time she does this, she simply does not eat as she doesn't want to record.  She stated that her therapist strongly advises against recording food intake for this reason.   - Cannot eat bone broth because it is not kosher.    (5/11/2020):   - She notes she has gained weight and has been getting up in the middle of the night to eat.   - She continues to work with her therapist on eating in the middle of the night and fear of eating too much  - She reports she does not eat much during the day, often forgets to eat meals until her  reminds her to eat   - She eats a high CHO diet, and does not like a lot of protein foods, and she is finding it difficult to find kosher product when she orders her groceries  - She continues to order out frequently   - Pt reports she is vegetarian because meat does not sit well in her stomach, and then reports having some steak for Easter.     6/8/2020:   - Pt reports she has not been feeling well  this morning and is not planning on having breakfast.   - She continues to consume a high CHO diet. She is trying to limit her snacking on crackers and is having more vegetable cut up   - She is not doing any physical activity, she was previously using a 60 up machine and feeling stronger but she stopped doing extra movement and she is unsure why she did not continue  - Pt reports she will eat a lot of packaged foods    Diet recall:   B: Bowl of cereal with lots fruit  L: Several bites of the impossible burger from [employer], a lot of vegetables OR ramen but will not eat the whole serving. Eating a lot of packaged stuff. Sometimes a sandwich   D: Stir goss: with chicken with vegetables   Snack: 1/2 of the week will get up in the middle of the night and snack mainly on apple sauce or crackers or vegetables   Beverages: drinking water and propel       Progress Towards Previous Goals:  Goals:   1. Eat 3 meals with lean protein at each meal, along with a non-starchy vegetable or whole fruit, up to 1/2 c carb at a meal. Plan every morning what you plan to eat for breakfast, lunch, and dinner for that day.-Not Met    Breakfast: [employer] shake + fruit   Lunch: protein + fruit or vegetable   Dinner: protein + fruit or vegetable  2. If needing a snack, have vegetables or protein snack. Limit 3x/day. -Improving   3.  Walk/ use 60 up machine 15 min daily, increasing as able-Not Met   4. Continue working on Meal planning for all meals. Consider buying a non-fat cottage cheese, and/or eggs instead of bagel/grits/toast for breakfast. -Not Met   5. Drink 48-64 ounces of water/fluids a day.-Met    Nutrition Prescription:   Grams Protein: 60 (minimum) - Not meeting  Amount of Fluid: 48-64 oz    Nutrition Diagnosis  Current: Overweight related to history of excessive energy intake, variable eating habits/intake and lack of sufficient exercise as evidenced by pt report and BMI > 25. - Continues    Intervention  1. Reviewed and  modified goals   2. Discussed concern that she is not eating enough throughout the day, which can be contributing to her weakness and her eating in the middle of the night. Recommended eating 3 meals daily with a protein source at every meal. Will perform NFPE next time pt is in clinic  3. Reviewed high protein foods, and encouraged a higher protein food with breakfast   4. Reviewed vitamin and mineral recommendations.  5. AVS via [employer]     Patient Understanding: good  Expected Compliance: good    Goals:   1. Eat 3 meals with lean protein at each meal, along with a non-starchy vegetable or whole fruit, up to 1/2 c carb at a meal. Plan every morning what you plan to eat for breakfast, lunch, and dinner for that day.   Breakfast: built bar + fruit   Lunch: protein  + fruit or vegetable   Dinner: protein + fruit or vegetable  2. If needing a snack, have vegetables or protein snack. Limit 3x/day.    3. Walk/ use 60 up machine 15 min daily, increasing as able.   4. Have a list of high protein food on the refrigerator to help remind you to grab a protein while eating, Continue working on Meal planning for all meals. Consider buying a non-fat cottage cheese, and/or eggs.  5. Drink 48-64 ounces of water/fluids a day.    Follow-Up: 3-6 months     Phone call contact time:   Call Started at: 9:31 am  Call Ended at: 9:44 am    Time spent with patient: 13 minutes  Carrie Chawla MS, RD, LD          Again, thank you for allowing me to participate in the care of your patient.      Sincerely,    Carrie Chawla RD

## 2020-06-08 NOTE — TELEPHONE ENCOUNTER
[facility] Call Center    Phone Message    May a detailed message be left on voicemail: yes     Reason for Call: Symptoms or Concerns     If patient has red-flag symptoms, warm transfer to triage line    Current symptom or concern: blurry vision    Symptoms have been present for:  2 month(s)    Has patient previously been seen for this? Yes    By : Pt of Dr Underwood    Date: NA     Are there any new or worsening symptoms? Yes: Pt requests call back - said Appt is not until Oct and does not want to wait that long - wants to discuss symptoms and see about being seen sooner please - Thanks for calling Pt back - worried about Glaucoma and other Eye concerns - Thanks        Action Taken: Message routed to:  [facility] ([facility] EYE    Travel Screening: Not Applicable

## 2020-06-09 NOTE — TELEPHONE ENCOUNTER
I left a VM for the patient.  She may call scheduling and make an appointment as Dr. Underwood has appointments open in one month at this time.  If she has more urgent concerns she may start in the Optometry clinic.

## 2020-06-25 ENCOUNTER — VIRTUAL VISIT (OUTPATIENT)
Dept: PSYCHIATRY | Facility: CLINIC | Age: 65
End: 2020-06-25
Payer: MEDICARE

## 2020-06-25 DIAGNOSIS — F33.1 MAJOR DEPRESSIVE DISORDER, RECURRENT EPISODE, MODERATE (H): ICD-10-CM

## 2020-06-25 DIAGNOSIS — F43.12 NIGHTMARES ASSOCIATED WITH CHRONIC POST-TRAUMATIC STRESS DISORDER: ICD-10-CM

## 2020-06-25 DIAGNOSIS — F51.5 NIGHTMARES ASSOCIATED WITH CHRONIC POST-TRAUMATIC STRESS DISORDER: ICD-10-CM

## 2020-06-25 RX ORDER — ARIPIPRAZOLE 2 MG/1
2 TABLET ORAL DAILY
Qty: 90 TABLET | Refills: 1 | Status: SHIPPED | OUTPATIENT
Start: 2020-06-25 | End: 2020-12-03

## 2020-06-25 RX ORDER — VILAZODONE HYDROCHLORIDE 40 MG/1
40 TABLET ORAL DAILY
Qty: 90 TABLET | Refills: 1 | Status: SHIPPED | OUTPATIENT
Start: 2020-06-25 | End: 2020-12-03

## 2020-06-25 RX ORDER — PRAZOSIN HYDROCHLORIDE 5 MG/1
CAPSULE ORAL
Qty: 270 CAPSULE | Refills: 1 | Status: SHIPPED | OUTPATIENT
Start: 2020-06-25 | End: 2020-12-03

## 2020-06-25 RX ORDER — PRAZOSIN HYDROCHLORIDE 2 MG/1
CAPSULE ORAL
Qty: 90 CAPSULE | Refills: 1 | Status: SHIPPED | OUTPATIENT
Start: 2020-06-25 | End: 2020-12-03

## 2020-06-25 ASSESSMENT — PATIENT HEALTH QUESTIONNAIRE - PHQ9: SUM OF ALL RESPONSES TO PHQ QUESTIONS 1-9: 14
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
## 2020-07-03 NOTE — PROGRESS NOTES
ASSESSMENT/PLAN: Elizabeth is a 61 yo female with history of Type 2 DM c/b peripheral neuropathy, multiple psychiatric disorders, hyperparathyrdoisim, LION on CPAP, HTN, hyperlipidemia, osteoporosis, polycystic kidney dz s/p transplant in 2014 who presents with her  for follow-up regarding medications and home health nursing.We will make a referral for home health nursing care. See back as needed. All other questions answered.  Diagnoses and all orders for this visit:     1. Hyperlipidemia, unspecified hyperlipidemia type  -     Lipid panel reflex to direct LDL Fasting; Future    2. Generalized edema  -     furosemide (LASIX) 20 MG tablet; Take 1 tablet (20 mg) by mouth daily    3. Gastroesophageal reflux disease without esophagitis  -     omeprazole (PRILOSEC) 40 MG capsule; Take 1 capsule (40 mg) by mouth daily    4. Chronic kidney disease, stage V (H)  -     simvastatin (ZOCOR) 20 MG tablet; Take 1 tablet (20 mg) by mouth At Bedtime    5. Screening for malignant neoplasm of cervix   We discussed that she made need yearly paps until 67 yoa given her immunosuppressed status. I will defer to the OB's recommendations in regards to screening interval  but she is certainly in need of one this year and therefore I would like her to get this as soon as possible. Referral sent.   -     OB/GYN REFERRAL        I, Mine Bhardwaj, MS3, am acting as the scribe for Horacio Sheridan MD. All aspects of the exam and documentation were approved by the attending physician.    The student did the history and exam as above and then acted as scribe as I then completely performed the history and physical documented above again myself.  Supervising Physician Horacio Sheridan MD        SUBJECTIVE:  Elizabeth Palma is a 61 yo female with history of Type 2 DM c/b peripheral neuropathy, multiple psychiatric disorders, hyperparathyrdoisim, LION on CPAP, HTN, hyperlipidemia, osteoporosis, polycystic kidney dz s/p transplant in 2014 who presents with her  Begin taking the antibiotics as prescribed.  Continue to drink plenty of fluids.  I recommend you follow-up with primary care provider in the next week.  Return to the emergency department for any new or worsening symptoms.    for followup regarding medications and home health nursing. Since her last visit, no significant illness/injuries/er visits.     She is not checking blood sugars frequently but denies any new symptoms such as polyuria, polydipsia, numbness or tingling (besides in her healing wrist).   Has hypoglycemic symptoms once a week. Gets lightheaded usually, resolves with glucose tablets. She reports diplopia when she is tired and driving in the car. She is seeing an eye doctor for this in a couple week. She will get her annual eye exam for diabetes at this time as well.     Would like a home health aid (they previously denied this). She has not been able to keep up with housework given her wrist pain and the multiple hospitalizations that she has had to help her  through. Her main concern right now is taking take of her  and making sure he eats.     She has regular bloodwork coming up in a couple weeks - would like to hold off on the lipid panel until that date and get with her other labs.     10 point ROS of systems including Constitutional, Eyes, Respiratory, Cardiovascular, Gastroenterology, Genitourinary, Integumentary, Muscularskeletal, Psychiatric were all negative except for pertinent positives noted in my HPI.    Patient Active Problem List   Diagnosis     Autosomal dominant polycystic kidney disease     Hypertension     Hyperlipidemia     Abnormal MRI, cervical spine     Sensory loss     Premature menopause age 37     OP (osteoporosis) T score -3.8     LION on CPAP     Major depressive disorder, recurrent episode, moderate (H)     Generalized anxiety disorder     CMC DJD(carpometacarpal degenerative joint disease), localized primary     Pain in joint, forearm -- L unhealed Fx     -donor kidney transplant     Immunosuppressed status (H)     Hyperparathyroidism, secondary (H)     Hyperparathyroidism (H)     Senile osteoporosis     Pain in joint involving ankle and foot     Nightmares  associated with chronic post-traumatic stress disorder     Type 2 diabetes mellitus with peripheral neuropathy (H)     Posttraumatic stress disorder     Right knee pain     Left knee pain     Age-related osteoporosis without current pathological fracture     Narcissistic personality disorder       Past Medical History:   Diagnosis Date     Abnormal MRI, cervical spine 10/15/2011    2011; mild changes noted. Study done for left arm symptoms Impression:  1. Mild multilevel degenerative disc disease with no significant canal or neural stenosis seen. motion artifact on the STIR images in these are not interpretable. The remaining images were interpreted      Autosomal dominant polycystic kidney disease 2011     (Problem list name updated by automated process. Provider to review and confirm.)     CMC DJD(carpometacarpal degenerative joint disease), localized primary 3/5/2013     -donor kidney transplant 3/20/2014     Depressive disorder 11/15/2012     DM type 2 (diabetes mellitus, type 2) (H) 2013     Encounter for long-term (current) use of other medications 2015     Family history of tremor 10/17/2011     Generalized anxiety disorder 11/15/2012     Glaucoma      Hyperlipidemia 10/15/2011     Hyperparathyroidism, secondary (H) 2015     Hypertension     resolved     Immunosuppressed status (H) 3/20/2014     Major depressive disorder, recurrent episode, moderate (H) 11/15/2012     Obesity (BMI 30-39.9)      OP (osteoporosis) T score -3.8 2009 T-score -3.7      LION (obstructive sleep apnoea) 10/15/2012    intol to cpa     Pain in joint, forearm -- L unhealed Fx 2013     Premature menopause age 37 7/10/2012    OCP (vaginal bldg)-->HT which she stopped 2 mo later documented at 2007 visit (age 51).      Restless leg syndrome      Rib fractures 2013     Sensory loss 10/17/2011    Bottom of feet; uncertain if there is a neuropathy per notes.       Stiffness of joint,  not elsewhere classified, hand 3/5/2013     Tremor 10/15/2011    head     Uncomplicated asthma        Past Surgical History:   Procedure Laterality Date     ABDOMEN SURGERY       ANKLE SURGERY       C TRANSPLANTATION OF KIDNEY  3/2014     C/SECTION, LOW TRANSVERSE      x 2     CHOLECYSTECTOMY  1990     COLONOSCOPY       ESOPHAGOSCOPY, GASTROSCOPY, DUODENOSCOPY (EGD), COMBINED N/A 5/19/2015    Procedure: COMBINED ESOPHAGOSCOPY, GASTROSCOPY, DUODENOSCOPY (EGD);  Surgeon: Sky Davey MD;  Location:  GI     ESOPHAGOSCOPY, GASTROSCOPY, DUODENOSCOPY (EGD), COMBINED N/A 5/19/2015    Procedure: COMBINED ESOPHAGOSCOPY, GASTROSCOPY, DUODENOSCOPY (EGD), BIOPSY SINGLE OR MULTIPLE;  Surgeon: Sky Davey MD;  Location:  GI     EYE SURGERY       LAPAROSCOPY, SURGICAL; REPAIR INCISIONAL OR VENTRAL HERNIA       ORTHOPEDIC SURGERY       HERMINIA EN Y BOWEL  1990     WRIST SURGERY         Family History   Problem Relation Age of Onset     MENTAL ILLNESS Other      family hx     HEART DISEASE Other      DIABETES Other      CANCER Other      Genetic Disorder Father      Hyperlipidemia Mother      Glaucoma No family hx of      Macular Degeneration No family hx of      Hypertension No family hx of        Social History     Social History     Marital status:      Spouse name: Rahat     Number of children: 2     Years of education: N/A     Occupational History           part-time     Social History Main Topics     Smoking status: Former Smoker     Smokeless tobacco: Never Used     Alcohol use No     Drug use: No     Sexual activity: Yes     Partners: Male     Birth control/ protection: None      Comment: 1 partner     Other Topics Concern     Not on file     Social History Narrative       Health Maintenance   Topic Date Due     SKIN CANCER SCREENING Q1 YR (NO IN BASKET)  1955     DEPRESSION ACTION PLAN Q1 YR  08/28/1975     ADVANCE DIRECTIVE PLANNING Q5 YRS  08/28/2012     PAP Q3 YR  12/20/2016     LIPID  MONITORING Q1 YEAR  04/13/2018     EYE EXAM Q1 YEAR  04/18/2018     PHQ-9 Q3 MONTHS  06/27/2018     A1C Q6 MO  07/19/2018     INFLUENZA VACCINE (SYSTEM ASSIGNED)  09/01/2018     FOOT EXAM Q1 YEAR  11/20/2018     MAMMO Q1 YR  12/05/2018     MICROALBUMIN Q1 YEAR  01/19/2019     CREATININE Q1 YEAR  03/12/2019     TSH W/ FREE T4 REFLEX Q2 YEAR  05/19/2019     TETANUS IMMUNIZATION (SYSTEM ASSIGNED)  10/12/2022     COLONOSCOPY Q5 YR  11/30/2022     PNEUMOVAX 1X HI RISK PATIENT < 65 (NO IB MSG)  Completed     HEPATITIS C SCREENING  Completed       OBJECTIVE:  Physical Exam:  /81  Pulse 84  Resp 16  Wt 71.4 kg (157 lb 4.8 oz)  BMI 29.72 kg/m2  Constitutional: no distress, comfortable, pleasant   Eyes: anicteric, normal extra-ocular movements    Skin: no concerning lesions, no jaundice   Neurological: cranial nerves grossly intact. Mild resting remor appreciated.   Psychological: appropriate mood

## 2020-07-14 ENCOUNTER — OFFICE VISIT (OUTPATIENT)
Dept: PODIATRY | Facility: CLINIC | Age: 65
End: 2020-07-14
Payer: MEDICARE

## 2020-07-14 DIAGNOSIS — M20.42 HAMMER TOES OF BOTH FEET: ICD-10-CM

## 2020-07-14 DIAGNOSIS — E11.49 TYPE II OR UNSPECIFIED TYPE DIABETES MELLITUS WITH NEUROLOGICAL MANIFESTATIONS, NOT STATED AS UNCONTROLLED(250.60) (H): ICD-10-CM

## 2020-07-14 DIAGNOSIS — L60.2 ONYCHAUXIS: Primary | ICD-10-CM

## 2020-07-14 DIAGNOSIS — M20.41 HAMMER TOES OF BOTH FEET: ICD-10-CM

## 2020-07-14 PROCEDURE — 99213 OFFICE O/P EST LOW 20 MIN: CPT | Performed by: PODIATRIST

## 2020-07-14 NOTE — LETTER
2020         RE: Elizabeth Palma  [address]  [facility] [id number]        Dear Colleague,    Thank you for referring your patient, Elizabeth Palma, to the [facility]. Please see a copy of my visit note below.    Past Medical History:   Diagnosis Date     Abnormal MRI, cervical spine 10/15/2011    2011; mild changes noted. Study done for left arm symptoms Impression:  1. Mild multilevel degenerative disc disease with no significant canal or neural stenosis seen. motion artifact on the STIR images in these are not interpretable. The remaining images were interpreted      Autosomal dominant polycystic kidney disease 2011     (Problem list name updated by automated process. Provider to review and confirm.)     CMC DJD(carpometacarpal degenerative joint disease), localized primary 3/5/2013     -donor kidney transplant 3/20/2014     Depressive disorder 11/15/2012     DM type 2 (diabetes mellitus, type 2) (H) 2013     Encounter for long-term (current) use of other medications 2015     Family history of tremor 10/17/2011     Gastroesophageal reflux disease      Generalized anxiety disorder 11/15/2012     Glaucoma      Hyperlipidemia 10/15/2011     Hyperparathyroidism, secondary (H) 2015     Hypertension     resolved     Immunosuppressed status (H) 3/20/2014     Major depressive disorder, recurrent episode, moderate (H) 11/15/2012     Obesity (BMI 30-39.9)      OP (osteoporosis) T score -3.8 2009 T-score -3.7      LION (obstructive sleep apnoea) 10/15/2012    intol to cpa     Pain in joint, forearm -- L unhealed Fx 2013     Premature menopause age 37 7/10/2012    OCP (vaginal bldg)-->HT which she stopped 2 mo later documented at 2007 visit (age 51).      Restless leg syndrome      Rib fractures 2013     Sensory loss 10/17/2011    Bottom of feet; uncertain if there is a neuropathy per notes.       Stiffness of joint,  not elsewhere classified, hand 3/5/2013     Tremor 10/15/2011    head     Uncomplicated asthma      Patient Active Problem List   Diagnosis     Hypertension     Hyperlipidemia     Abnormal MRI, cervical spine     Sensory loss     Premature menopause age 37     OP (osteoporosis) T score -3.8     Major depressive disorder, recurrent episode, moderate (H)     Generalized anxiety disorder     CMC DJD(carpometacarpal degenerative joint disease), localized primary     Pain in joint, forearm -- L unhealed Fx     -donor kidney transplant     Immunosuppressed status (H)     Hyperparathyroidism, secondary (H)     Hyperparathyroidism (H)     Senile osteoporosis     Pain in joint involving ankle and foot     Nightmares associated with chronic post-traumatic stress disorder     Type 2 diabetes mellitus with peripheral neuropathy (H)     Posttraumatic stress disorder     Right knee pain     Left knee pain     Age-related osteoporosis without current pathological fracture     Narcissistic personality disorder (H)     Personal history of other drug therapy     Median sensory neuropathy, left     Marital conflict     Suicidal ideation     Autosomal dominant polycystic kidney disease     Secondary hyperparathyroidism (H)     Anemia, iron deficiency     Past Surgical History:   Procedure Laterality Date     ABDOMEN SURGERY       ANKLE SURGERY       C TRANSPLANTATION OF KIDNEY  3/2014     C/SECTION, LOW TRANSVERSE      x 2     CHOLECYSTECTOMY       COLONOSCOPY       ESOPHAGOSCOPY, GASTROSCOPY, DUODENOSCOPY (EGD), COMBINED N/A 2015    Procedure: COMBINED ESOPHAGOSCOPY, GASTROSCOPY, DUODENOSCOPY (EGD);  Surgeon: Sky Davey MD;  Location:  GI     ESOPHAGOSCOPY, GASTROSCOPY, DUODENOSCOPY (EGD), COMBINED N/A 2015    Procedure: COMBINED ESOPHAGOSCOPY, GASTROSCOPY, DUODENOSCOPY (EGD), BIOPSY SINGLE OR MULTIPLE;  Surgeon: Sky Davey MD;  Location:  GI     EYE SURGERY       LAPAROSCOPY, SURGICAL;  REPAIR INCISIONAL OR VENTRAL HERNIA       ORTHOPEDIC SURGERY       HERMINIA EN Y BOWEL  1990     WRIST SURGERY       Social History     Socioeconomic History     Marital status:      Spouse name: Rahat     Number of children: 2     Years of education: Not on file     Highest education level: Not on file   Occupational History     Comment: part-time   Social Needs     Financial resource strain: Not on file     Food insecurity     Worry: Not on file     Inability: Not on file     Transportation needs     Medical: Not on file     Non-medical: Not on file   Tobacco Use     Smoking status: Former Smoker     Smokeless tobacco: Never Used   Substance and Sexual Activity     Alcohol use: No     Alcohol/week: 0.0 standard drinks     Drug use: No     Sexual activity: Yes     Partners: Male     Birth control/protection: None     Comment: 1 partner   Lifestyle     Physical activity     Days per week: Not on file     Minutes per session: Not on file     Stress: Not on file   Relationships     Social connections     Talks on phone: Not on file     Gets together: Not on file     Attends [employer] service: Not on file     Active member of club or organization: Not on file     Attends meetings of clubs or organizations: Not on file     Relationship status: Not on file     Intimate partner violence     Fear of current or ex partner: Not on file     Emotionally abused: Not on file     Physically abused: Not on file     Forced sexual activity: Not on file   Other Topics Concern     Parent/sibling w/ CABG, MI or angioplasty before 65F 55M? Not Asked   Social History Narrative     Not on file     Family History   Problem Relation Age of Onset     Mental Illness Other         family hx     Heart Disease Other      Diabetes Other      Cancer Other      Genetic Disorder Father      Mental Illness Father      Diabetes Father      Hypertension Father      Hyperlipidemia Mother      Diabetes Mother      Hypertension Mother      Mental  Illness Other      Diabetes Other      Glaucoma No family hx of      Macular Degeneration No family hx of      Lab Results   Component Value Date    A1C 7.1 12/17/2019    A1C 7.2 04/24/2019    A1C 7.4 11/27/2018    A1C 6.4 01/19/2018    A1C 6.5 05/19/2017     SUBJECTIVE FINDINGS:  A 64-year-old female returns to clinic for onychauxis and diabetic foot cares.  She relates she is doing okay.  She relates she is using oils on her feet.  She relates she needs new diabetic shoes.  Relates she has got left ankle pain and she did not want to do surgery, so she is going to be doing some laser treatment.  She is not sure if that has helped yet or not.  Relates to no ulcers or sores since we have seen her last.  Relates to no injuries.  Relates she does have numbness, tingling and neuropathy in her feet.      OBJECTIVE FINDINGS:  DP and PT are 2/4 bilaterally.  She has decreased ankle joint dorsiflexion bilaterally.  She has dorsally contracted digits 1 through 5 bilaterally.  There is no erythema, no drainage, no odor, no calor bilaterally.  She has incurvated nails with some thickening, dystrophy and subungual debris to differing degrees 1 through 5 bilaterally.        ASSESSMENT AND PLAN:  Onychauxis bilaterally.  She is diabetic with peripheral neuropathy and hammertoes present.  Diagnosis and treatment options were discussed with her.  All of the nails were reduced bilaterally upon consent.  Prescription for new diabetic shoes and inserts given, and she was given the phone number and address to [facility] to pick those up.  Return to clinic and see me in about 3 months.         Again, thank you for allowing me to participate in the care of your patient.        Sincerely,        Javier Tuttle DPM

## 2020-07-14 NOTE — PROGRESS NOTES
Past Medical History:   Diagnosis Date     Abnormal MRI, cervical spine 10/15/2011    2011; mild changes noted. Study done for left arm symptoms Impression:  1. Mild multilevel degenerative disc disease with no significant canal or neural stenosis seen. motion artifact on the STIR images in these are not interpretable. The remaining images were interpreted      Autosomal dominant polycystic kidney disease 2011     (Problem list name updated by automated process. Provider to review and confirm.)     CMC DJD(carpometacarpal degenerative joint disease), localized primary 3/5/2013     -donor kidney transplant 3/20/2014     Depressive disorder 11/15/2012     DM type 2 (diabetes mellitus, type 2) (H) 2013     Encounter for long-term (current) use of other medications 2015     Family history of tremor 10/17/2011     Gastroesophageal reflux disease      Generalized anxiety disorder 11/15/2012     Glaucoma      Hyperlipidemia 10/15/2011     Hyperparathyroidism, secondary (H) 2015     Hypertension     resolved     Immunosuppressed status (H) 3/20/2014     Major depressive disorder, recurrent episode, moderate (H) 11/15/2012     Obesity (BMI 30-39.9)      OP (osteoporosis) T score -3.8 2009 T-score -3.7      LION (obstructive sleep apnoea) 10/15/2012    intol to cpa     Pain in joint, forearm -- L unhealed Fx 2013     Premature menopause age 37 7/10/2012    OCP (vaginal bldg)-->HT which she stopped 2 mo later documented at 2007 visit (age 51).      Restless leg syndrome      Rib fractures 2013     Sensory loss 10/17/2011    Bottom of feet; uncertain if there is a neuropathy per notes.       Stiffness of joint, not elsewhere classified, hand 3/5/2013     Tremor 10/15/2011    head     Uncomplicated asthma      Patient Active Problem List   Diagnosis     Hypertension     Hyperlipidemia     Abnormal MRI, cervical spine     Sensory loss     Premature menopause age 37      OP (osteoporosis) T score -3.8     Major depressive disorder, recurrent episode, moderate (H)     Generalized anxiety disorder     CMC DJD(carpometacarpal degenerative joint disease), localized primary     Pain in joint, forearm -- L unhealed Fx     -donor kidney transplant     Immunosuppressed status (H)     Hyperparathyroidism, secondary (H)     Hyperparathyroidism (H)     Senile osteoporosis     Pain in joint involving ankle and foot     Nightmares associated with chronic post-traumatic stress disorder     Type 2 diabetes mellitus with peripheral neuropathy (H)     Posttraumatic stress disorder     Right knee pain     Left knee pain     Age-related osteoporosis without current pathological fracture     Narcissistic personality disorder (H)     Personal history of other drug therapy     Median sensory neuropathy, left     Marital conflict     Suicidal ideation     Autosomal dominant polycystic kidney disease     Secondary hyperparathyroidism (H)     Anemia, iron deficiency     Past Surgical History:   Procedure Laterality Date     ABDOMEN SURGERY       ANKLE SURGERY       C TRANSPLANTATION OF KIDNEY  3/2014     C/SECTION, LOW TRANSVERSE      x 2     CHOLECYSTECTOMY       COLONOSCOPY       ESOPHAGOSCOPY, GASTROSCOPY, DUODENOSCOPY (EGD), COMBINED N/A 2015    Procedure: COMBINED ESOPHAGOSCOPY, GASTROSCOPY, DUODENOSCOPY (EGD);  Surgeon: Sky Davey MD;  Location:  GI     ESOPHAGOSCOPY, GASTROSCOPY, DUODENOSCOPY (EGD), COMBINED N/A 2015    Procedure: COMBINED ESOPHAGOSCOPY, GASTROSCOPY, DUODENOSCOPY (EGD), BIOPSY SINGLE OR MULTIPLE;  Surgeon: Sky Davey MD;  Location:  GI     EYE SURGERY       LAPAROSCOPY, SURGICAL; REPAIR INCISIONAL OR VENTRAL HERNIA       ORTHOPEDIC SURGERY       HERMINIA EN Y BOWEL       WRIST SURGERY       Social History     Socioeconomic History     Marital status:      Spouse name: Rahat     Number of children: 2     Years of education: Not on  file     Highest education level: Not on file   Occupational History     Comment: part-time   Social Needs     Financial resource strain: Not on file     Food insecurity     Worry: Not on file     Inability: Not on file     Transportation needs     Medical: Not on file     Non-medical: Not on file   Tobacco Use     Smoking status: Former Smoker     Smokeless tobacco: Never Used   Substance and Sexual Activity     Alcohol use: No     Alcohol/week: 0.0 standard drinks     Drug use: No     Sexual activity: Yes     Partners: Male     Birth control/protection: None     Comment: 1 partner   Lifestyle     Physical activity     Days per week: Not on file     Minutes per session: Not on file     Stress: Not on file   Relationships     Social connections     Talks on phone: Not on file     Gets together: Not on file     Attends Synagogue service: Not on file     Active member of club or organization: Not on file     Attends meetings of clubs or organizations: Not on file     Relationship status: Not on file     Intimate partner violence     Fear of current or ex partner: Not on file     Emotionally abused: Not on file     Physically abused: Not on file     Forced sexual activity: Not on file   Other Topics Concern     Parent/sibling w/ CABG, MI or angioplasty before 65F 55M? Not Asked   Social History Narrative     Not on file     Family History   Problem Relation Age of Onset     Mental Illness Other         family hx     Heart Disease Other      Diabetes Other      Cancer Other      Genetic Disorder Father      Mental Illness Father      Diabetes Father      Hypertension Father      Hyperlipidemia Mother      Diabetes Mother      Hypertension Mother      Mental Illness Other      Diabetes Other      Glaucoma No family hx of      Macular Degeneration No family hx of      Lab Results   Component Value Date    A1C 7.1 12/17/2019    A1C 7.2 04/24/2019    A1C 7.4 11/27/2018    A1C 6.4 01/19/2018    A1C 6.5 05/19/2017      SUBJECTIVE FINDINGS:  A 64-year-old female returns to clinic for onychauxis and diabetic foot cares.  She relates she is doing okay.  She relates she is using oils on her feet.  She relates she needs new diabetic shoes.  Relates she has got left ankle pain and she did not want to do surgery, so she is going to be doing some laser treatment.  She is not sure if that has helped yet or not.  Relates to no ulcers or sores since we have seen her last.  Relates to no injuries.  Relates she does have numbness, tingling and neuropathy in her feet.      OBJECTIVE FINDINGS:  DP and PT are 2/4 bilaterally.  She has decreased ankle joint dorsiflexion bilaterally.  She has dorsally contracted digits 1 through 5 bilaterally.  There is no erythema, no drainage, no odor, no calor bilaterally.  She has incurvated nails with some thickening, dystrophy and subungual debris to differing degrees 1 through 5 bilaterally.        ASSESSMENT AND PLAN:  Onychauxis bilaterally.  She is diabetic with peripheral neuropathy and hammertoes present.  Diagnosis and treatment options were discussed with her.  All of the nails were reduced bilaterally upon consent.  Prescription for new diabetic shoes and inserts given, and she was given the phone number and address to [facility] to pick those up.  Return to clinic and see me in about 3 months.

## 2020-07-14 NOTE — PATIENT INSTRUCTIONS
Thanks for coming today.  [facility]  [address] [place] [address]    To schedule future appointments in Ortho Clinic, you may call [phone].    To schedule ordered imaging by your provider:   Call [facility] [phone]    To schedule an injection ordered by your provider:  Call [facility] Injection scheduling line: [phone]  [email or web address] available online at:  [email or web address]    Please call if any further questions or concerns ([phone]).  Clinic hours 8 am to 5 pm.    Return to clinic (call) if symptoms worsen or fail to improve.

## 2020-07-20 ENCOUNTER — TELEPHONE (OUTPATIENT)
Dept: DERMATOLOGY | Facility: CLINIC | Age: 65
End: 2020-07-20

## 2020-07-20 NOTE — TELEPHONE ENCOUNTER
"Teledermatology Nurse Call for RETURN patients seen within the last 3 years:      The patient was contacted by phone and we reviewed they have a visit in teledermatology upcoming with an MD or ASHU;  Importantly, \"a teledermatology visit may not be as thorough as an in-person visit, and the quality of the photograph and/or video sent may not be of the same quality as that taken by the dermatology clinic.\"     We have found that certain health care needs can be provided without the need for an in-person physical exam.   If a prescription is necessary we can send it directly to your pharmacy.  If lab work is needed we can place an order for that and you can then stop by our lab to have the test done at a later time.If during the course of the call the physician/provider feels a video visit is not appropriate, you will not be charged for this service.Visits are billed at different rates depending on your insurance coverage. Please reach out to your insurance provider with any questions.    The patient chose to:                                                                                                                                                                                                                    Decline teledermatology visit with mychart photos and instead rescheduled their in-person visit.     The patient will send photographs via [employer] for review. Instructions for photography are being sent to the patient via [employer] messaging.       The patient verified the location of the photo/video visit to be [place].(The physician must be notified by nurse if the patient is not in the state of [place] during the encounter)    The patient denied skin pain, fever, mucosal symptoms(lesions, blisters, sores in the mouth, nose, eyes, or genitals)  IF PATIENT ENDORSES ANY OF THESE STOP AND PAGE ON CALL ATTENDING                                                                                                       "

## 2020-08-08 ENCOUNTER — NURSE TRIAGE (OUTPATIENT)
Dept: NURSING | Facility: CLINIC | Age: 65
End: 2020-08-08

## 2020-08-09 NOTE — TELEPHONE ENCOUNTER
Patient called stated she grabbed the wrong pill container and had now taken 3 metformin. She already took 3 at dinner time.     No symptoms currently.     Gave number to poison control center.     Shazia Friedman RN/[facility]      Reason for Disposition    [1] DOUBLE DOSE (an extra dose or lesser amount) of prescription drug AND [2] any symptoms (e.g., dizziness, nausea, pain, sleepiness)    Additional Information    Negative: Drug overdose and nurse unable to answer question    Negative: Caller requesting information not related to medicine    Negative: Caller requesting a prescription for Strep throat and has a positive culture result    Negative: Rash while taking a medication or within 3 days of stopping it    Negative: Immunization reaction suspected    Negative: [1] Asthma AND [2] having symptoms of asthma (cough, wheezing, etc)    Protocols used: MEDICATION QUESTION CALL-A-

## 2020-08-13 ENCOUNTER — OFFICE VISIT (OUTPATIENT)
Dept: OPHTHALMOLOGY | Facility: CLINIC | Age: 65
End: 2020-08-13
Attending: OPHTHALMOLOGY
Payer: MEDICARE

## 2020-08-13 DIAGNOSIS — E11.9 TYPE 2 DIABETES MELLITUS WITHOUT RETINOPATHY (H): ICD-10-CM

## 2020-08-13 DIAGNOSIS — H40.9 MILD STAGE GLAUCOMA: ICD-10-CM

## 2020-08-13 DIAGNOSIS — Z96.1 PSEUDOPHAKIA OF BOTH EYES: ICD-10-CM

## 2020-08-13 DIAGNOSIS — H04.123 DRY EYE SYNDROME OF BOTH EYES: ICD-10-CM

## 2020-08-13 DIAGNOSIS — H43.313 VITREOUS STRANDS OF BOTH EYES: ICD-10-CM

## 2020-08-13 DIAGNOSIS — H40.003 GLAUCOMA SUSPECT OF BOTH EYES: Primary | ICD-10-CM

## 2020-08-13 PROCEDURE — 92015 DETERMINE REFRACTIVE STATE: CPT | Mod: GY,ZF

## 2020-08-13 PROCEDURE — 67031 LASER SURGERY EYE STRANDS: CPT | Mod: ZF | Performed by: OPHTHALMOLOGY

## 2020-08-13 PROCEDURE — G0463 HOSPITAL OUTPT CLINIC VISIT: HCPCS | Mod: ZF

## 2020-08-13 PROCEDURE — 92133 CPTRZD OPH DX IMG PST SGM ON: CPT | Mod: ZF | Performed by: OPHTHALMOLOGY

## 2020-08-13 RX ORDER — LATANOPROST 50 UG/ML
1 SOLUTION/ DROPS OPHTHALMIC AT BEDTIME
Qty: 7.5 ML | Refills: 2 | Status: SHIPPED | OUTPATIENT
Start: 2020-08-13 | End: 2021-09-27

## 2020-08-13 ASSESSMENT — REFRACTION_WEARINGRX
OS_AXIS: 106
OS_ADD: +2.50
OS_CYLINDER: +2.00
OD_AXIS: 054
OD_SPHERE: -1.75
OS_SPHERE: -1.25
OD_ADD: +2.50
OD_CYLINDER: +1.75

## 2020-08-13 ASSESSMENT — VISUAL ACUITY
OD_CC+: -1
METHOD: SNELLEN - LINEAR
OD_CC: 20/20
CORRECTION_TYPE: GLASSES
OS_CC: 20/20
OS_CC+: -2

## 2020-08-13 ASSESSMENT — REFRACTION_MANIFEST
OD_CYLINDER: +1.25
OS_CYLINDER: +1.75
OD_SPHERE: -1.75
OD_ADD: +2.50
OS_SPHERE: -1.00
OS_AXIS: 105
OS_ADD: +2.50
OD_AXIS: 055

## 2020-08-13 ASSESSMENT — TONOMETRY
IOP_METHOD: TONOPEN
OD_IOP_MMHG: 17
OD_IOP_MMHG: 14
OS_IOP_MMHG: 14
OS_IOP_MMHG: 17
IOP_METHOD: APPLANATION

## 2020-08-13 ASSESSMENT — EXTERNAL EXAM - LEFT EYE: OS_EXAM: NORMAL

## 2020-08-13 ASSESSMENT — CONF VISUAL FIELD
OD_NORMAL: 1
OS_NORMAL: 1
METHOD: COUNTING FINGERS

## 2020-08-13 ASSESSMENT — CUP TO DISC RATIO
OS_RATIO: 0.8
OD_RATIO: 0.8

## 2020-08-13 ASSESSMENT — EXTERNAL EXAM - RIGHT EYE: OD_EXAM: NORMAL

## 2020-08-13 NOTE — NURSING NOTE
Chief Complaints and History of Present Illnesses   Patient presents with     Diabetic Eye Exam     Chief Complaint(s) and History of Present Illness(es)     Diabetic Eye Exam     Vision: States that the LE is cloudy    Associated symptoms: +floaters in BE that started about 6 months ago and have gotten worse      Duration: years    Blood Sugars: is controlled    Pain scale: 0/10              Comments     BS does not take it  Lab Results       Component                Value               Date                       A1C                      7.1                 12/17/2019                 A1C                      7.2                 04/24/2019                 A1C                      7.4                 11/27/2018                 A1C                      6.4                 01/19/2018                 A1C                      6.5                 05/19/2017            Ro Meyers COT 9:17 AM August 13, 2020

## 2020-08-13 NOTE — PROGRESS NOTES
Chief Complaint(s) and History of Present Illness(es)     Diabetic Eye Exam     Vision: States that the LE is cloudy    Associated symptoms: +floaters in BE that started about 6 months ago and   have gotten worse      Duration: years    Blood Sugars: is controlled    Pain scale: 0/10              Comments     BS does not take it  Lab Results       Component                Value               Date                       A1C                      7.1                 12/17/2019                 A1C                      7.2                 04/24/2019                 A1C                      7.4                 11/27/2018                 A1C                      6.4                 01/19/2018                 A1C                      6.5                 05/19/2017            Ro Meyers COT 9:17 AM August 13, 2020         Review of systems for the eyes was negative other than the pertinent positives/negatives listed in the HPI.      Assessment & Plan      Elizabeth Palma is a 64 year old female with the following diagnoses:   1. Glaucoma suspect of both eyes    2. Type 2 diabetes mellitus without retinopathy (H)    3. Dry eye syndrome of both eyes    4. Pseudophakia of both eyes    5. Mild stage glaucoma       Based on age and increased C/D both eyes  IOP today 14/14  Maximum intraocular pressure 21/18  Family history: negative  Trauma history: negative  Pachymetry : 532/527     Using latanoprost at bedtime both eyes with good compliance  Intraocular pressure great both eyes   Visual field stable prior year and OCT Nerve fiber layer stable both eyes today (early superior thinning right eye)  No retinopathy  Stressed good glycemic and hypertensive control  Monitor yearly      Continue artificial tears twice a day and as needed   Warm compresses as needed    New floaters both eyes which are worsening 6 months  Vitreous floaters right > left eye   Retina attached 360  Discussed YAG vitreolysis and patient wishes to  proceed right eye only  See procedure note; RTC 1 month and consider left eye    Patient disposition:   Return in about 1 month (around 9/13/2020) for Vision, Pressure, Dilation.       Dex Charles, PGY3  Ophthalmology Resident    Attending Physician Attestation:  Complete documentation of historical and exam elements from today's encounter can be found in the full encounter summary report (not reduplicated in this progress note).  I personally obtained the chief complaint(s) and history of present illness.  I confirmed and edited as necessary the review of systems, past medical/surgical history, family history, social history, and examination findings as documented by others; and I examined the patient myself.  I personally reviewed the relevant tests, images, and reports as documented above.  I formulated and edited as necessary the assessment and plan and discussed the findings and management plan with the patient and family. . - Yonas Underwood MD   Attending Physician Image/Tesing Attestation: I personally reviewed the ophthalmic test(s) associated with this encounter, agree with the interpretation(s) as documented by the resident/fellow, and have edited the corresponding report(s) as necessary.  Attending Physician Procedure Attestation: I was present for the entire procedure

## 2020-08-27 ENCOUNTER — VIRTUAL VISIT (OUTPATIENT)
Dept: INTERNAL MEDICINE | Facility: CLINIC | Age: 65
End: 2020-08-27
Payer: MEDICARE

## 2020-08-27 DIAGNOSIS — E11.42 TYPE 2 DIABETES MELLITUS WITH PERIPHERAL NEUROPATHY (H): ICD-10-CM

## 2020-08-27 DIAGNOSIS — R53.83 FATIGUE, UNSPECIFIED TYPE: Primary | ICD-10-CM

## 2020-08-27 DIAGNOSIS — I50.9 CONGESTIVE HEART FAILURE, UNSPECIFIED HF CHRONICITY, UNSPECIFIED HEART FAILURE TYPE (H): ICD-10-CM

## 2020-08-27 DIAGNOSIS — R63.5 ABNORMAL WEIGHT GAIN: ICD-10-CM

## 2020-08-27 NOTE — PROGRESS NOTES
SUBJECTIVE/OBJECTIVE:    Elizabeth Palma is a 64 year old female who presents to clinic today for the following health issues:    She is very tired all of the time and has gained weight (she is at 190lbs which is up from our 170lbs in March 2020).  She says she is not eating much and is not eating differently..  Her therapist things it is inactivity.  She wonders about something organic. She does have swelling in her legs.    She sleeps a lot.  She will fall asleep on the cough or when crocheting.  She sleeps well at night she says - 8-10hr at night.  She wakes up at night for unclear reasons - maybe bone pain.  No arm or leg movements.  She says that when she wakes up she has to force herself to move - they do not want to move.  No collapse during strong emotion.    The following have been reviewed:  Medication list  Allergies  Past medical/surgical history  Family history    ROS:   GI - she is vomiting and has diarrhea  Psych - more depression  Endo - she does not have a meter so does not know what her glucoses have been doing  Musc - her ankle has been bad, she is getting laser treatment from a chiropractor (she does not want surgery because of the recovery period), she is working on increasing     Exam:   Musc - she showed me her legs and had her push but there was no dent    ASSESSMENT/PLAN:   Fatigue and weight gain and reported swelling but not on exam - I wonder about sleep apnea as the cause given the excessive daytime sleepiness.  The corollary symptoms do not go along with narcolepsy but she does fall asleep easily so this is on the differential.  Additionally, she has weight gain so I will look at causes for fluid excess with liver, kidney and heart at the top.  She may be losing protein with kidney transplant history which has not been checked in 6 months.  -     Comprehensive metabolic panel; Future  -     CBC with platelets differential; Future  -     TSH with free T4 reflex; Future  -     N terminal  pro BNP; Future  -     Protein  random urine with Creat Ratio; Future  -     Echocardiogram Complete; Future  -     SLEEP EVALUATION & MANAGEMENT REFERRAL - ADULT -[facility] [phone] (Age 15 and up); Future    Type 2 diabetes mellitus with peripheral neuropathy (H)  -     Hemoglobin A1c; Future    Video-Visit Details    Type of service:  Video Visit    Video Start Time: 1:03p  Video End Time: 1:17p    Originating Location (pt. Location): Home    Distant Location (provider location):  [facility]     Mode of Communication:  Video Conference via Sujey Sheridan MD, FACP

## 2020-08-28 ENCOUNTER — TELEPHONE (OUTPATIENT)
Dept: INTERNAL MEDICINE | Facility: CLINIC | Age: 65
End: 2020-08-28

## 2020-08-28 ASSESSMENT — ASTHMA QUESTIONNAIRES: ACT_TOTALSCORE: 20

## 2020-08-31 ENCOUNTER — MEDICAL CORRESPONDENCE (OUTPATIENT)
Dept: HEALTH INFORMATION MANAGEMENT | Facility: CLINIC | Age: 65
End: 2020-08-31

## 2020-09-01 ENCOUNTER — MYC MEDICAL ADVICE (OUTPATIENT)
Dept: SLEEP MEDICINE | Facility: CLINIC | Age: 65
End: 2020-09-01

## 2020-09-01 VITALS — WEIGHT: 190 LBS | HEIGHT: 61 IN | BODY MASS INDEX: 35.87 KG/M2

## 2020-09-01 ASSESSMENT — MIFFLIN-ST. JEOR: SCORE: 1354.21

## 2020-09-01 NOTE — PROGRESS NOTES
"Elizabeth Palma is a 65 year old female who is being evaluated via a billable video visit.      The patient has been notified of following:     \"This video visit will be conducted via a call between you and your physician/provider. We have found that certain health care needs can be provided without the need for an in-person physical exam.  This service lets us provide the care you need with a video conversation.  If a prescription is necessary we can send it directly to your pharmacy.  If lab work is needed we can place an order for that and you can then stop by our lab to have the test done at a later time.    Video visits are billed at different rates depending on your insurance coverage.  Please reach out to your insurance provider with any questions.    If during the course of the call the physician/provider feels a video visit is not appropriate, you will not be charged for this service.\"    Patient has given verbal consent for Video visit? Yes  How would you like to obtain your AVS? MyChart  If you are dropped from the video visit, the video invite should be resent to: Send to e-mail at: [email or web address]  Will anyone else be joining your video visit? No        Video-Visit Details    Type of service:  Video Visit    Video Start Time: 8:05 AM  Video End Time: 8:37 AM    Originating Location (pt. Location): Home    Distant Location (provider location):  [facility]     Platform used for Video Visit: [employer] [facility] [place]   Outpatient Sleep Medicine Consultation  September 2, 2020      Name: Elizabeth Palma MRN# [id number]   Age: 65 year old YOB: 1955     Date of Consultation: September 2, 2020  Consultation is requested by: Horacio Sheridan MD  [address]  [place] [address] Horacio Sheridan  Primary care provider: Horacio Sheridan         Reason for Sleep Consult:     Elizabeth Palma is a 65 year old female with a history of " difficulty staying asleep 2 years of mild obstructive sleep apnea         Assessment and Plan:     Summary Sleep Diagnoses:      Psychophysiological insomnia JUAN RAMON 23 with probable insufficient sleep and daytime sleepiness    Restless leg syndrome with low iron currently improved on gabapentin and iron replacement    Mild obstructive sleep apnea by history without improvement on CPAP    PTSD with nightmare disorder    Parasomnia-sleep talking without dream enactment; currently not a spontaneous complaint of the patient      Comorbid Diagnoses:      Anxiety and depression with history of suicidal ideation and narcissistic features    Diabetes mellitus with peripheral neuropathy     Hypertension    Polycystic kidney diseased status post transplant; immunosuppressed    Secondary hyperparathyroidism      Summary Recommendations:      Avoid screen activity including television or games within 2 hours of bedtime and consider using audio meditation or audiobooks    Enrollment in insomnia management including possible cognitive behavioral therapy    Keep sleep diary until next visit with insomnia program    Consider polysomnogram if daytime sleepiness persistent after sleep extension    Summary Counseling: We discussed concept of cognitive behavioral therapy for insomnia as well as negative impact of light exposure and game engagement before bedtime in delaying sleep onset.  The purpose of avoiding screen time is to allow natural sleep to occur earlier before midnight and extend your sleep time.  We also informed her that her risk of sleep disordered breathing as a cause for her symptoms would seem low given the current absence of snoring, previous minimal findings at same body weight, and poor response to CPAP in the past.         History of Present Illness:     Psychophysiological insomnia and restless leg syndrome with low ferritin.   Elizabeth Palma is a 65 year old female who has a long history of daytime sleepiness  with napping throughout the day which she weighs heavier than the complaint of 5 awakenings during the night.  She acknowledges symptoms of anxiety with difficulty with an active mind before falling asleep and watches old versions of Star Trek and plays games on her computer between 945 and midnight when she finally falls asleep.  Her symptoms of tingling and urge to move her legs have improved or resolved with the use of gabapentin and iron replacement.    Nightmare disorder/PTSD.  This patient has had nightmares since childhood relatively well controlled with prazosin currently.  She denies awakening with panic attacks.    Very mild sleep apnea without response to CPAP in the past.  Although she carries a prior diagnosis of mild sleep apnea, her  notes that sleep is generally uninterrupted and that snoring occurs less than once per week.  Her weight has not changed since the study below.    PREVIOUS IN- LAB or HOME SLEEP STUDIES:   Date: 2011          SLEEP-WAKE SCHEDULE:     Bedtime 9:45 pm with> 2 hour latency watching screens [star trek and games computer games] until midnight with sleep disruption and 5 x awakenings for minutes to 10 minutes occasionally longer with a snack 20min with few nocturnal movements and only occasional snoring.    Final awakening 7-7:30 without alarm.    Estimated sleep time 6 to 6-1/2 hours.    Napping throughout the day with unanticipated while reading, crocheting, watching TV but not driving and no drowsy driving.         SCALES       SLEEP APNEA: Stopbang score         INSOMNIA:  Insomnia severity score: 23       SLEEPINESS: Hoople sleepiness scale: 10 [normal < 11]   Drowsy driving/near accidents none    SLEEP COMPLAINTS:  Cardio-respiratory    Snoring- occasional/week  Dyspnea- rare- only once  Morning headaches or confusion-denies  Coexisting Lung disease: denies    Coexisting Heart disease: denies    Does patient have a bed partner: denies  Has bed partner been  sleeping separately because of snoring:  denies            RLS Screen: When you try to relax in the evening or sleep at  night, do you ever have unpleasant, restless feelings in your  legs that can be relieved by walking or movement? Tingling and urge to move controlled by gabapentin and iron      Periodic limb movement: denies    Narcolepsy:  denies sudden urges of sleep attacks    Cataplexy:  denies     Sleep paralysis:  denies      Hallucinations:   denies       Sleep Behaviors:    Leg symptoms/movements: denies    Motor restlessness:denies    Night terrors: denies    Bruxism: yes-dentist suspected    Automatic behaviors: none    Other subjective complaints:    Anxiety or rumination denies    Pain and discomfort at  night: denies    Waking up with heart pounding or racing: denies    GERD or aspiration:denies         Parasomnia:   NREM - talks in sleep on most nights; asking questions at least once a night  REM  - denies dream enactment; injuries                Medications:     Current Outpatient Medications   Medication Sig     acetaminophen (TYLENOL) 325 MG tablet Take 325-650 mg by mouth as needed     acetylcysteine (N-ACETYL-L-CYSTEINE) 600 MG CAPS capsule 400 mg 1 cap daily     albuterol (PROAIR HFA/PROVENTIL HFA/VENTOLIN HFA) 108 (90 Base) MCG/ACT inhaler Inhale 2 puffs into the lungs every 6 hours as needed for shortness of breath / dyspnea or wheezing     ARIPiprazole (ABILIFY) 2 MG tablet Take 1 tablet (2 mg) by mouth daily     aspirin EC 81 MG EC tablet Take 1 tablet (81 mg) by mouth daily     blood glucose monitoring (ACCU-CHEK RALPH PLUS) meter device kit      blood glucose monitoring (NO BRAND SPECIFIED) meter device kit Use to test blood sugar 2 times daily or as directed.     blood glucose monitoring (NO BRAND SPECIFIED) test strip Use to test blood sugars 2 times daily or as directed     blood glucose monitoring (SOFTCLIX) lancets Use to test blood sugar 2 times daily or as directed.      Cholecalciferol (VITAMIN D) 1000 UNITS capsule 1,000 Units      cyanocolbalamin (VITAMIN  B-12) 1000 MCG tablet Take 1 tablet by mouth daily.     cycloSPORINE modified (GENERIC EQUIVALENT) 25 MG capsule Take 5 capsules (125 mg) by mouth 2 times daily TAKE 5 CAPSULES (125MG) BY MOUTH TWO TIMES A DAY     cycloSPORINE modified (GENERIC EQUIVALENT) 25 MG capsule Take 5 capsules (125 mg) by mouth 2 times daily     econazole nitrate 1 % cream Apply topically daily     estradiol (VAGIFEM) 10 MCG TABS vaginal tablet Place 1 tablet (10 mcg) vaginally twice a week     ferrous sulfate (FEROSUL) 325 (65 Fe) MG tablet Take 1 tablet (325 mg) by mouth daily (with breakfast)     fluticasone (FLONASE) 50 MCG/ACT nasal spray Spray 1 spray into both nostrils daily     furosemide (LASIX) 20 MG tablet Take 1 tablet (20 mg) by mouth daily     gabapentin 300 MG PO capsule Take 2 capsules (600 mg) by mouth At Bedtime     latanoprost (XALATAN) 0.005 % ophthalmic solution Place 1 drop into both eyes At Bedtime     metFORMIN (GLUCOPHAGE-XR) 500 MG 24 hr tablet Take 3 tablets (1,500 mg) by mouth daily (with dinner)     mupirocin (BACTROBAN) 2 % external ointment Apply topically 3 times daily     mycophenolate (GENERIC EQUIVALENT) 250 MG capsule Take 4 capsules (1,000 mg) by mouth 2 times daily     omeprazole (PRILOSEC) 40 MG DR capsule Take 1 capsule (40 mg) by mouth daily     ondansetron (ZOFRAN-ODT) 4 MG ODT tab Take 1 tablet (4 mg) by mouth every 6 hours as needed     order for DME Equipment being ordered: [facility]  [id number]   Ankle support, , fig 8, lace up     order for DME Walker with front wheels and a seat.     Polyvinyl Alcohol-Povidone (REFRESH OP) Apply to eye as needed Both eyes     prazosin (MINIPRESS) 2 MG capsule Take 2mg cap + 3 x 5mg caps (15mg) at bedtime (total dose=17mg)     prazosin (MINIPRESS) 5 MG capsule Take 3 x 5mg (15mg) caps + 1 x 2mg caps at bedtime (total dose=17mg)     simvastatin (ZOCOR) 20 MG tablet Take 1  tablet (20 mg) by mouth At Bedtime     sulfamethoxazole-trimethoprim (BACTRIM/SEPTRA) 400-80 MG tablet Take 1 tablet by mouth daily     topiramate (TOPAMAX) 200 MG tablet Take 1 tablet (200 mg) by mouth 2 times daily     Vaginal Lubricant (REPLENS) GEL Use vaginally as needed. Can use up to 3 times per week.     vilazodone (VIIBRYD) 40 MG TABS tablet Take 1 tablet (40 mg) by mouth daily     No current facility-administered medications for this visit.         Allergies   Allergen Reactions     Percocet [Oxycodone-Acetaminophen] Nausea and Vomiting     Novocain [Procaine Hcl] Hives     Had reaction 25 years ago to old renetta. Pt reports multiple injections of lidocaine since then without reaction.  Tolerated lidocaine injection today without difficulty.  Osmar Mark MD IR Service.            Past Medical History:     Does not need 02 supplement at night   Past Medical History:   Diagnosis Date     Abnormal MRI, cervical spine 10/15/2011    2011; mild changes noted. Study done for left arm symptoms Impression:  1. Mild multilevel degenerative disc disease with no significant canal or neural stenosis seen. motion artifact on the STIR images in these are not interpretable. The remaining images were interpreted      Autosomal dominant polycystic kidney disease 2011     (Problem list name updated by automated process. Provider to review and confirm.)     CMC DJD(carpometacarpal degenerative joint disease), localized primary 3/5/2013     -donor kidney transplant 3/20/2014     Depressive disorder 11/15/2012     DM type 2 (diabetes mellitus, type 2) (H) 2013     Encounter for long-term (current) use of other medications 2015     Family history of tremor 10/17/2011     Gastroesophageal reflux disease      Generalized anxiety disorder 11/15/2012     Glaucoma      Hyperlipidemia 10/15/2011     Hyperparathyroidism, secondary (H) 2015     Hypertension     resolved     Immunosuppressed status (H)  3/20/2014     Major depressive disorder, recurrent episode, moderate (H) 11/15/2012     Obesity (BMI 30-39.9)      OP (osteoporosis) T score -3.8 9/21/2009 2007 T-score -3.7      LION (obstructive sleep apnoea) 10/15/2012    intol to cpa     Pain in joint, forearm -- L unhealed Fx 5/21/2013     Premature menopause age 37 7/10/2012    OCP (vaginal bldg)-->HT which she stopped 2 mo later documented at Jan 12, 2007 visit (age 51).      Restless leg syndrome      Rib fractures 4/13/2013     Sensory loss 10/17/2011    Bottom of feet; uncertain if there is a neuropathy per notes.       Stiffness of joint, not elsewhere classified, hand 3/5/2013     Tremor 10/15/2011    head     Uncomplicated asthma              Past Surgical History:    Previous upper airway surgery    Past Surgical History:   Procedure Laterality Date     ABDOMEN SURGERY       ANKLE SURGERY       C TRANSPLANTATION OF KIDNEY  3/2014     C/SECTION, LOW TRANSVERSE      x 2     CHOLECYSTECTOMY  1990     COLONOSCOPY       ESOPHAGOSCOPY, GASTROSCOPY, DUODENOSCOPY (EGD), COMBINED N/A 5/19/2015    Procedure: COMBINED ESOPHAGOSCOPY, GASTROSCOPY, DUODENOSCOPY (EGD);  Surgeon: Sky Davey MD;  Location:  GI     ESOPHAGOSCOPY, GASTROSCOPY, DUODENOSCOPY (EGD), COMBINED N/A 5/19/2015    Procedure: COMBINED ESOPHAGOSCOPY, GASTROSCOPY, DUODENOSCOPY (EGD), BIOPSY SINGLE OR MULTIPLE;  Surgeon: Sky Davey MD;  Location:  GI     EYE SURGERY       LAPAROSCOPY, SURGICAL; REPAIR INCISIONAL OR VENTRAL HERNIA       ORTHOPEDIC SURGERY       HERMINIA EN Y BOWEL  1990     WRIST SURGERY              Social History:     Social History     Tobacco Use     Smoking status: Former Smoker     Smokeless tobacco: Never Used   Substance Use Topics     Alcohol use: No     Alcohol/week: 0.0 standard drinks         Chemical History:     1/2 coffee per day         Family History:     Family History   Problem Relation Age of Onset     Mental Illness Other         family hx      Heart Disease Other      Diabetes Other      Cancer Other      Genetic Disorder Father      Mental Illness Father      Diabetes Father      Hypertension Father      Hyperlipidemia Mother      Diabetes Mother      Hypertension Mother      Mental Illness Other      Diabetes Other      Glaucoma No family hx of      Macular Degeneration No family hx of         Sleep Family Hx:        RLS- none  LION - none           Review of Systems:     A complete 10 point review of systems was negative other than HPI or as commented below:            Physical Examination:     Objective   Reported vitals:  There were no vitals taken for this visit.   healthy, alert and no distress  Psych: Alert and oriented times 3; coherent speech, normal   rate and volume, able to articulate logical thoughts, able   to abstract reason, no tangential thoughts, no hallucinations   or delusions  Patient affect is normal with monotone speech             Data: All pertinent previous laboratory data reviewed     Lab Results   Component Value Date    PH 7.41 10/16/2011    PO2 95 10/16/2011    PCO2 34 (L) 10/16/2011    HCO3 21 10/16/2011     Lab Results   Component Value Date    TSH 0.82 12/17/2019    TSH 1.74 10/30/2018     Lab Results   Component Value Date     (H) 02/03/2020     (H) 01/02/2020     Lab Results   Component Value Date    HGB 13.5 02/24/2020    HGB 12.1 02/03/2020     Lab Results   Component Value Date    BUN 16 02/03/2020    BUN 15 01/02/2020    CR 0.90 02/03/2020    CR 0.96 01/02/2020     Lab Results   Component Value Date    AST 10 02/03/2020    AST 13 06/02/2015    ALT 22 02/03/2020    ALT 19 06/02/2015    ALKPHOS 65 02/03/2020    ALKPHOS 123 06/02/2015    BILITOTAL 0.3 02/03/2020    BILITOTAL 1.1 06/02/2015    BILICONJ 0.0 04/04/2014    BILICONJ 0.0 06/06/2008           Copy to: Horacio Sheridan MD 9/2/2020

## 2020-09-02 ENCOUNTER — VIRTUAL VISIT (OUTPATIENT)
Dept: SLEEP MEDICINE | Facility: CLINIC | Age: 65
End: 2020-09-02
Attending: INTERNAL MEDICINE
Payer: MEDICARE

## 2020-09-02 DIAGNOSIS — F51.04 PSYCHOPHYSIOLOGICAL INSOMNIA: Primary | ICD-10-CM

## 2020-09-02 DIAGNOSIS — R53.83 FATIGUE, UNSPECIFIED TYPE: ICD-10-CM

## 2020-09-02 PROCEDURE — 99203 OFFICE O/P NEW LOW 30 MIN: CPT | Mod: 95 | Performed by: INTERNAL MEDICINE

## 2020-09-02 NOTE — PATIENT INSTRUCTIONS
Summary Recommendations:      Avoid screen activity including television or games within 2 hours of bedtime and consider using audio meditation or audiobooks    Enrollment in insomnia management including possible cognitive behavioral therapy    Keep sleep diary until next visit with insomnia program    Consider polysomnogram if daytime sleepiness persistent after sleep extension    Summary Counseling: We discussed concept of cognitive behavioral therapy for insomnia as well as negative impact of light exposure and game engagement before bedtime in delaying sleep onset.  The purpose of avoiding screen time is to allow natural sleep to occur earlier before midnight and extend your sleep time.  We also informed her that her risk of sleep disordered breathing as a cause for her symptoms would seem low given the current absence of snoring, previous minimal findings at same body weight, and poor response to CPAP in the past.            Cognitive Behavioral Therapy for Insomnia (CBT-I)    Developing Healthy Sleep Thoughts    Insomnia is often is triggered by stressful events such as a change in employment, a separation, medical illness, or loss.  How you handle your sleep problem, mainly your thoughts and behaviors, determines in large part whether your sleeplessness is short -term or develops into chronic insomnia well after the stressful event is over.     This step of your program involves learning a set of skills to change negative and worrisome thoughts about sleep.   Insomnia is more likely to persist if you interpret the onset as a threat or loss of control.  Worry, fears and untrue beliefs about insomnia can become a vicious cycle.  Negative sleep thoughts activate the physical and emotional systems of your body.  This strengthens wakefulness and weakens your sleep system.  This in turn produces increased stress and pressure to sleep.  We call this unhelpful sleep effort.     Changing How You Think About  Insomnia    We now know that our thoughts and attitudes affect our stress response.  Negative sleep thoughts can worsen your insomnia. They can lead to greater fear or anxiety about sleep, which in turn can aggravate your sleep problem. Thinking more positively and realistically about your sleep promotes its health and healing.     Myths about Sleep    ? People need 8 hours of sleep     This is untrue.  Sleep needs vary from person to person.  Most people need between 6-8 hours to feel alert during the day.  People who sleep 7 hours live longer than those who sleep 8 hours.  Research also suggests people who sleep 5 hours live longer than those who sleep 9 hours    ? Insomnia is the same as sleep deprivation    Sleep deprivation is lack of sleep due to not allowing enough time for sleep.  This is typically not true for insomnia where people have enough time for sleep and even extend their sleep time trying to get more sleep.  Most people with insomnia get about the same amount of sleep as normal sleepers.  The difference is that with insomnia the sleep is fragmented and less consolidated.    You are Getting More Sleep than You Think    Research using objective sleep tests reveal that people with insomnia get an average of one hour more sleep than they think. This is due in part because the brain misperceives Stage 1 and 2 sleep as being awake.  In addition, stress and arousal while awake in bed changes the brain's perception of time awake.    Examples of Unhealthy Sleep Thoughts    Negative sleep thoughts can have a profound impact on your ability to get a good night's sleep. Below are some examples of negative thoughts associated with insomnia that may sound familiar to you:    ? I must get 8 hours of sleep to function during the day.  ? I won't get to sleep tonight.  ? Insomnia is going to cause health problems.  ? I am dreading going to bed.  ? I woke up early again.  I know I won't get back to sleep.  ? The  reason I feel terrible today is because of my insomnia.  ? I've totally lost control of my sleep.  ? I can't sleep without a sleeping pill.    Negative thoughts usually occur automatically and feel like a knee-jerk reaction.  They are often untrue or distorted, especially late in the evening as you become increasingly tired and others are asleep.      Changing Unhealthy Sleep Thoughts    Now that you understand the impact that negative thoughts can have on your sleep, you are ready to change these unhelpful thoughts.  The strategy is powerful and simple:  By recognizing and replacing your negative thoughts about sleep with more accurate, positive thoughts, you will be less anxious and frustrated about your sleep. A more realistic and positive attitude about sleep will allow you to relax and sleep more easily through the night.           There are several important steps involved in changing your unhelpful and negative thoughts about sleep:            Examples of Healthy Sleep Thoughts    ? My work will not suffer much if I have a poor night's sleep.    ? I'm probably getting more sleep than I think.    ? Other things than my sleep affect my daytime functioning.    ? Because I didn't sleep well last night, I am more likely to sleep well tonight because of increased sleep drive that leads to deeper sleep.    ? Sleep requirements vary from one person to another.    ? If I don't sleep well, most of the time the worst that can happen is that my mood might not be as bright the next day.    ? If I awaken after about 5 hours of sleep, I have gotten the core sleep I need for the day.    ? I'm more likely to fall asleep the longer I've been awake    ? I'm more likely to fall asleep as my body temperature begins to decrease through the night.    ? My body's wakefulness system takes charge during the day to promote daytime functioning.    ? These sleep skills have worked for others, and they can work for me.    Other  Recommendations for Changing Beliefs and Attitudes   About Your Sleep    ? Keep Realistic Expectations     There is a widespread belief that 8 hours of sleep is necessary to feel refreshed and function well during the day. Many believe that good sleep means never waking up at night.  Others come to expect that they should always wake up in the morning feeling full of energy.  Concerns may arise when your actual sleep falls short of these expectations. Try to avoid placing undue pressure on yourself to achieve certain sleep levels.  It only increases anxiety about sleeping.  Focus on quality sleep not quantity of sleep.    ? Revise Your Thoughts about the Causes of Insomnia      There is a natural tendency to attribute our sleep problems completely to external factors such as a chemical imbalance, pain, aging or things over which we may have little control.  Although these factors may contribute to your insomnia, research shows CBT-I is beneficial even if they are present.    ? Don't Blame Insomnia for All Daytime Impairments      Many individuals blame insomnia for every symptom or concern they experience during the day from fatigue to lack of concentration. Though poor sleep may produce some of these symptoms, it us usually untrue that all daytime impairment is attributable to insomnia.  It is more often that other factors such as stress and co-occurring medical problems contribute more to how you feel during the day.      ? Don't Catastrophize      Catastrophic thinking means making a sleep mountain out of a molehill.  Some people believe poor sleep will have catastrophic consequences to their physical health, mental health and appearance. Others see insomnia as a complete loss of control.  These perceived consequences of insomnia often prompt people to seek medication or other treatment.  Keep in mind insomnia can be very unpleasant but for the most part is not dangerous.    ? Don't Focus on Sleep     Some people  reduce their activity level because of poor sleep.  Although sleep is a necessary part of life, don't make it the focus of your thoughts and concern. Trust that if you engage in health sleep habits, your body will give you the sleep it needs.  Make sure you continue your normal activities despite your insomnia.    ? Never Try to Sleep      Of all the habits the most important one is this:  Never try to force yourself to sleep.  Doing so usually backfires and makes things worse. Instead, focus on keeping to your prescribed sleep schedule, getting up at the same time every day and using the bed only for sleep.

## 2020-09-03 ENCOUNTER — HOSPITAL ENCOUNTER (OUTPATIENT)
Dept: LAB | Facility: CLINIC | Age: 65
Discharge: HOME | End: 2020-09-03
Attending: INTERNAL MEDICINE | Admitting: INTERNAL MEDICINE
Payer: MEDICARE

## 2020-09-03 DIAGNOSIS — R63.5 ABNORMAL WEIGHT GAIN: ICD-10-CM

## 2020-09-03 DIAGNOSIS — R53.83 FATIGUE, UNSPECIFIED TYPE: ICD-10-CM

## 2020-09-03 DIAGNOSIS — I50.9 CONGESTIVE HEART FAILURE, UNSPECIFIED HF CHRONICITY, UNSPECIFIED HEART FAILURE TYPE (H): ICD-10-CM

## 2020-09-03 DIAGNOSIS — E11.42 TYPE 2 DIABETES MELLITUS WITH PERIPHERAL NEUROPATHY (H): ICD-10-CM

## 2020-09-03 LAB
ALBUMIN SERPL-MCNC: 3.8 G/DL (ref 3.4–5)
ALP SERPL-CCNC: 60 U/L (ref 40–150)
ALT SERPL W P-5'-P-CCNC: 43 U/L (ref 0–50)
ANION GAP SERPL CALCULATED.3IONS-SCNC: 6 MMOL/L (ref 3–14)
AST SERPL W P-5'-P-CCNC: 20 U/L (ref 0–45)
BASOPHILS # BLD AUTO: 0 10E9/L (ref 0–0.2)
BASOPHILS NFR BLD AUTO: 0.7 %
BILIRUB SERPL-MCNC: 0.4 MG/DL (ref 0.2–1.3)
BUN SERPL-MCNC: 18 MG/DL (ref 7–30)
CALCIUM SERPL-MCNC: 9.4 MG/DL (ref 8.5–10.1)
CHLORIDE SERPL-SCNC: 111 MMOL/L (ref 94–109)
CO2 SERPL-SCNC: 25 MMOL/L (ref 20–32)
CREAT SERPL-MCNC: 0.94 MG/DL (ref 0.52–1.04)
DIFFERENTIAL METHOD BLD: ABNORMAL
EOSINOPHIL # BLD AUTO: 0.1 10E9/L (ref 0–0.7)
EOSINOPHIL NFR BLD AUTO: 3.1 %
ERYTHROCYTE [DISTWIDTH] IN BLOOD BY AUTOMATED COUNT: 13.8 % (ref 10–15)
GFR SERPL CREATININE-BSD FRML MDRD: 64 ML/MIN/{1.73_M2}
GLUCOSE SERPL-MCNC: 143 MG/DL (ref 70–99)
HBA1C MFR BLD: 6.7 % (ref 0–5.6)
HCT VFR BLD AUTO: 44.4 % (ref 35–47)
HGB BLD-MCNC: 14.6 G/DL (ref 11.7–15.7)
IMM GRANULOCYTES # BLD: 0 10E9/L (ref 0–0.4)
IMM GRANULOCYTES NFR BLD: 1 %
LYMPHOCYTES # BLD AUTO: 0.5 10E9/L (ref 0.8–5.3)
LYMPHOCYTES NFR BLD AUTO: 11.3 %
MCH RBC QN AUTO: 30.1 PG (ref 26.5–33)
MCHC RBC AUTO-ENTMCNC: 32.9 G/DL (ref 31.5–36.5)
MCV RBC AUTO: 92 FL (ref 78–100)
MONOCYTES # BLD AUTO: 0.3 10E9/L (ref 0–1.3)
MONOCYTES NFR BLD AUTO: 7.7 %
NEUTROPHILS # BLD AUTO: 3.2 10E9/L (ref 1.6–8.3)
NEUTROPHILS NFR BLD AUTO: 76.2 %
NRBC # BLD AUTO: 0 10*3/UL
NRBC BLD AUTO-RTO: 0 /100
NT-PROBNP SERPL-MCNC: 185 PG/ML (ref 0–125)
PLATELET # BLD AUTO: 196 10E9/L (ref 150–450)
POTASSIUM SERPL-SCNC: 4.3 MMOL/L (ref 3.4–5.3)
PROT SERPL-MCNC: 6.6 G/DL (ref 6.8–8.8)
RBC # BLD AUTO: 4.85 10E12/L (ref 3.8–5.2)
SODIUM SERPL-SCNC: 142 MMOL/L (ref 133–144)
TSH SERPL DL<=0.005 MIU/L-ACNC: 1.13 MU/L (ref 0.4–4)
WBC # BLD AUTO: 4.2 10E9/L (ref 4–11)

## 2020-09-03 PROCEDURE — 83880 ASSAY OF NATRIURETIC PEPTIDE: CPT | Performed by: INTERNAL MEDICINE

## 2020-09-03 PROCEDURE — 83036 HEMOGLOBIN GLYCOSYLATED A1C: CPT | Performed by: INTERNAL MEDICINE

## 2020-09-03 PROCEDURE — 80053 COMPREHEN METABOLIC PANEL: CPT | Performed by: INTERNAL MEDICINE

## 2020-09-03 PROCEDURE — 85025 COMPLETE CBC W/AUTO DIFF WBC: CPT | Performed by: INTERNAL MEDICINE

## 2020-09-03 PROCEDURE — 36415 COLL VENOUS BLD VENIPUNCTURE: CPT | Performed by: INTERNAL MEDICINE

## 2020-09-03 PROCEDURE — 84443 ASSAY THYROID STIM HORMONE: CPT | Performed by: INTERNAL MEDICINE

## 2020-09-10 ENCOUNTER — HOSPITAL ENCOUNTER (OUTPATIENT)
Dept: CARDIOLOGY | Facility: CLINIC | Age: 65
Discharge: HOME | End: 2020-09-10
Attending: INTERNAL MEDICINE | Admitting: INTERNAL MEDICINE
Payer: MEDICARE

## 2020-09-10 DIAGNOSIS — I50.9 CONGESTIVE HEART FAILURE, UNSPECIFIED HF CHRONICITY, UNSPECIFIED HEART FAILURE TYPE (H): ICD-10-CM

## 2020-09-10 DIAGNOSIS — R53.83 FATIGUE, UNSPECIFIED TYPE: ICD-10-CM

## 2020-09-10 DIAGNOSIS — R63.5 ABNORMAL WEIGHT GAIN: ICD-10-CM

## 2020-09-10 PROCEDURE — 93306 TTE W/DOPPLER COMPLETE: CPT

## 2020-09-10 PROCEDURE — 93306 TTE W/DOPPLER COMPLETE: CPT | Mod: 26 | Performed by: INTERNAL MEDICINE

## 2020-09-11 NOTE — NURSING NOTE
Patient's information has been sent to Insomnia Team to call pt to schedule CBTI appointment.    RADHA Walker

## 2020-10-01 ENCOUNTER — OFFICE VISIT (OUTPATIENT)
Dept: OPHTHALMOLOGY | Facility: CLINIC | Age: 65
End: 2020-10-01
Attending: OPHTHALMOLOGY
Payer: MEDICARE

## 2020-10-01 DIAGNOSIS — H51.8 DIVERGENCE INSUFFICIENCY: ICD-10-CM

## 2020-10-01 DIAGNOSIS — Z96.1 PSEUDOPHAKIA OF BOTH EYES: ICD-10-CM

## 2020-10-01 DIAGNOSIS — H26.492 PCO (POSTERIOR CAPSULAR OPACIFICATION), LEFT: ICD-10-CM

## 2020-10-01 DIAGNOSIS — H43.313 VITREOUS STRANDS OF BOTH EYES: Primary | ICD-10-CM

## 2020-10-01 PROCEDURE — 99213 OFFICE O/P EST LOW 20 MIN: CPT | Mod: 24 | Performed by: OPHTHALMOLOGY

## 2020-10-01 PROCEDURE — G0463 HOSPITAL OUTPT CLINIC VISIT: HCPCS

## 2020-10-01 PROCEDURE — 67031 LASER SURGERY EYE STRANDS: CPT | Performed by: OPHTHALMOLOGY

## 2020-10-01 ASSESSMENT — REFRACTION_WEARINGRX
OS_AXIS: 106
OD_AXIS: 054
OS_SPHERE: -1.25
OD_CYLINDER: +1.75
OD_ADD: +2.50
OS_ADD: +2.50
OD_SPHERE: -1.75
OS_CYLINDER: +2.00

## 2020-10-01 ASSESSMENT — EXTERNAL EXAM - LEFT EYE: OS_EXAM: NORMAL

## 2020-10-01 ASSESSMENT — TONOMETRY
IOP_METHOD: TONOPEN
OS_IOP_MMHG: 10
OD_IOP_MMHG: 14

## 2020-10-01 ASSESSMENT — CONF VISUAL FIELD
OD_NORMAL: 1
OS_NORMAL: 1
METHOD: COUNTING FINGERS

## 2020-10-01 ASSESSMENT — CUP TO DISC RATIO
OS_RATIO: 0.8
OD_RATIO: 0.8

## 2020-10-01 ASSESSMENT — PACHYMETRY
OD_CT(UM): 532
OS_CT(UM): 527

## 2020-10-01 ASSESSMENT — VISUAL ACUITY
METHOD: SNELLEN - LINEAR
OS_CC+: -2
OD_CC: 20/20 SLOW
OS_CC: 20/20 SLOW
OD_CC+: -2
CORRECTION_TYPE: GLASSES

## 2020-10-01 ASSESSMENT — EXTERNAL EXAM - RIGHT EYE: OD_EXAM: NORMAL

## 2020-10-01 NOTE — NURSING NOTE
Chief Complaint(s) and History of Present Illness(es)     Glaucoma Follow-Up     In both eyes.  Associated symptoms include floaters and dryness (BE are still dry intermittently and maybe even worse in the last 2 weeks. ).  Negative for eye pain, redness, tearing and flashes.  Pain was noted as 0/10.              Comments     7 week f/u for Glaucoma suspect, BE and floaters (s/p YAG Vit RE 08/13/20). Pt c/o she has been noticing new double vision at a dist with BE together x 2 weeks, when pt closes one eye the double vision goes away x 2 weeks. Double vision is intermittent. Overall, vision has been doing well. Pt still notes the floaters in BE, RE less floaters after the laser (but still notes floaters lingering).     Ocular meds:  Latanoprost at bedtime BE  ATs prn BE    Glendacassie PenaRamone, COMT 2:44 PM October 1, 2020

## 2020-10-01 NOTE — PROGRESS NOTES
Chief Complaint(s) and History of Present Illness(es)     Glaucoma Follow-Up     Laterality: both eyes    Associated symptoms: floaters and dryness (BE are still dry   intermittently and maybe even worse in the last 2 weeks. ).  Negative for   eye pain, redness, tearing and flashes    Pain scale: 0/10              Comments     7 week f/u for Glaucoma suspect, BE and floaters (s/p YAG Vit RE   08/13/20). Pt c/o she has been noticing new double vision at a dist with   BE together x 2 weeks, when pt closes one eye the double vision goes away   x 2 weeks. Double vision is intermittent. Overall, vision has been doing   well. Pt still notes the floaters in BE, RE less floaters after the laser   (but still notes floaters lingering).     Ocular meds:  Latanoprost at bedtime BE  ATs prn BE    Glenda Pack, COMT 2:44 PM October 1, 2020               Review of systems for the eyes was negative other than the pertinent positives/negatives listed in the HPI.      Assessment & Plan      Elizabeth Palma is a 65 year old female with the following diagnoses:   1. Vitreous strands of both eyes    2. Pseudophakia of both eyes    3. PCO (posterior capsular opacification), left    4. Divergence insufficiency         S/P YAG vitreolysis right eye.  Improved floaters.  Left eye still bothers  RBA to YAG vitreolysis left eye reviewed, consent obtained  Able to debulk [place], but view partially obscured by nasal posterior capsular opacity (PCO).  Opened pc and will recheck in 1 mo  Return precautions reviewed     New divergence insufficiency noted today  Improved with prism.  Fresnel applied  If stable at home, can grind into new prescription (provided)      Patient disposition:   Return in about 4 weeks (around 10/29/2020) for DFE left.           Attending Physician Attestation:  Complete documentation of historical and exam elements from today's encounter can be found in the full encounter summary report (not reduplicated in this progress  note).  I personally obtained the chief complaint(s) and history of present illness.  I confirmed and edited as necessary the review of systems, past medical/surgical history, family history, social history, and examination findings as documented by others; and I examined the patient myself.  I personally reviewed the relevant tests, images, and reports as documented above.  I formulated and edited as necessary the assessment and plan and discussed the findings and management plan with the patient and family. . - Yonas Underwood MD

## 2020-10-06 ENCOUNTER — OFFICE VISIT (OUTPATIENT)
Dept: DERMATOLOGY | Facility: CLINIC | Age: 65
End: 2020-10-06
Payer: MEDICARE

## 2020-10-06 DIAGNOSIS — Z94.89 TRANSPLANT RECIPIENT: ICD-10-CM

## 2020-10-06 DIAGNOSIS — Z12.83 SCREENING EXAM FOR SKIN CANCER: ICD-10-CM

## 2020-10-06 DIAGNOSIS — D84.9 IMMUNOSUPPRESSED STATUS (H): ICD-10-CM

## 2020-10-06 DIAGNOSIS — D22.9 MULTIPLE BENIGN MELANOCYTIC NEVI: ICD-10-CM

## 2020-10-06 DIAGNOSIS — D18.01 CHERRY ANGIOMA: ICD-10-CM

## 2020-10-06 DIAGNOSIS — L82.1 SK (SEBORRHEIC KERATOSIS): Primary | ICD-10-CM

## 2020-10-06 PROCEDURE — 99213 OFFICE O/P EST LOW 20 MIN: CPT | Performed by: DERMATOLOGY

## 2020-10-06 ASSESSMENT — PAIN SCALES - GENERAL: PAINLEVEL: NO PAIN (0)

## 2020-10-06 NOTE — NURSING NOTE
Dermatology Rooming Note    Elizabeth Palma's goals for this visit include:   Chief Complaint   Patient presents with     Skin Check     Renal transplant 20/14. No spots of concern today.     Danielle Allred, CMA

## 2020-10-06 NOTE — LETTER
10/6/2020       RE: Elizabeth Palma  [address]  [address]  [place] [address]     Dear Colleague,    Thank you for referring your patient, Elizabeth Palma, to the [facility] [place] at [facility]. Please see a copy of my visit note below.    [facility] Dermatology Note      Dermatology Problem List:  1. Renal transplant 3/20/14, on cyclosporine and mycophenolate mofetil    Encounter Date: Oct 6, 2020    CC:   Chief Complaint   Patient presents with     Skin Check     Renal transplant . No spots of concern today.       History of Present Illness:  Ms. Elizabeth Palma is a 65 year old female who presents as a follow-up for skin chek. The patient was last seen 2019. History of renal transplant in , on cyclosporine and mycophenolate mofetil. She denies any new lesions of concern. No bleeding, tender, or growing lesions. No significant sun exposure. Otherwise no concerns.    Past Medical History:   Patient Active Problem List   Diagnosis     Hypertension     Hyperlipidemia     Abnormal MRI, cervical spine     Sensory loss     Premature menopause age 37     OP (osteoporosis) T score -3.8     Major depressive disorder, recurrent episode, moderate (H)     Generalized anxiety disorder     CMC DJD(carpometacarpal degenerative joint disease), localized primary     Pain in joint, forearm -- L unhealed Fx     -donor kidney transplant     Immunosuppressed status (H)     Hyperparathyroidism, secondary (H)     Hyperparathyroidism (H)     Senile osteoporosis     Pain in joint involving ankle and foot     Nightmares associated with chronic post-traumatic stress disorder     Type 2 diabetes mellitus with peripheral neuropathy (H)     Posttraumatic stress disorder     Right knee pain     Left knee pain     Age-related osteoporosis without current pathological fracture     Narcissistic personality disorder (H)      Personal history of other drug therapy     Median sensory neuropathy, left     Marital conflict     Suicidal ideation     Autosomal dominant polycystic kidney disease     Secondary hyperparathyroidism (H)     Anemia, iron deficiency     Past Medical History:   Diagnosis Date     Abnormal MRI, cervical spine 10/15/2011    2011; mild changes noted. Study done for left arm symptoms Impression:  1. Mild multilevel degenerative disc disease with no significant canal or neural stenosis seen. motion artifact on the STIR images in these are not interpretable. The remaining images were interpreted      Autosomal dominant polycystic kidney disease 2011     (Problem list name updated by automated process. Provider to review and confirm.)     CMC DJD(carpometacarpal degenerative joint disease), localized primary 3/5/2013     -donor kidney transplant 3/20/2014     Depressive disorder 11/15/2012     DM type 2 (diabetes mellitus, type 2) (H) 2013     Encounter for long-term (current) use of other medications 2015     Family history of tremor 10/17/2011     Gastroesophageal reflux disease      Generalized anxiety disorder 11/15/2012     Glaucoma      Hyperlipidemia 10/15/2011     Hyperparathyroidism, secondary (H) 2015     Hypertension     resolved     Immunosuppressed status (H) 3/20/2014     Major depressive disorder, recurrent episode, moderate (H) 11/15/2012     Obesity (BMI 30-39.9)      OP (osteoporosis) T score -3.8 2009 T-score -3.7      LION (obstructive sleep apnoea) 10/15/2012    intol to cpa     Pain in joint, forearm -- L unhealed Fx 2013     Premature menopause age 37 7/10/2012    OCP (vaginal bldg)-->HT which she stopped 2 mo later documented at 2007 visit (age 51).      Restless leg syndrome      Rib fractures 2013     Sensory loss 10/17/2011    Bottom of feet; uncertain if there is a neuropathy per notes.       Stiffness of joint, not elsewhere classified,  hand 3/5/2013     Tremor 10/15/2011    head     Uncomplicated asthma      Past Surgical History:   Procedure Laterality Date     ABDOMEN SURGERY       ANKLE SURGERY       C TRANSPLANTATION OF KIDNEY  3/2014     C/SECTION, LOW TRANSVERSE      x 2     CHOLECYSTECTOMY  1990     COLONOSCOPY       ESOPHAGOSCOPY, GASTROSCOPY, DUODENOSCOPY (EGD), COMBINED N/A 5/19/2015    Procedure: COMBINED ESOPHAGOSCOPY, GASTROSCOPY, DUODENOSCOPY (EGD);  Surgeon: Sky Davey MD;  Location:  GI     ESOPHAGOSCOPY, GASTROSCOPY, DUODENOSCOPY (EGD), COMBINED N/A 5/19/2015    Procedure: COMBINED ESOPHAGOSCOPY, GASTROSCOPY, DUODENOSCOPY (EGD), BIOPSY SINGLE OR MULTIPLE;  Surgeon: Sky Davey MD;  Location:  GI     EYE SURGERY       LAPAROSCOPY, SURGICAL; REPAIR INCISIONAL OR VENTRAL HERNIA       ORTHOPEDIC SURGERY       HERMINIA EN Y BOWEL  1990     WRIST SURGERY         Social History:  Patient reports that she has quit smoking. She has never used smokeless tobacco. She reports that she does not drink alcohol or use drugs.    Family History:  Family History   Problem Relation Age of Onset     Mental Illness Other         family hx     Heart Disease Other      Diabetes Other      Cancer Other      Genetic Disorder Father      Mental Illness Father      Diabetes Father      Hypertension Father      Hyperlipidemia Mother      Diabetes Mother      Hypertension Mother      Mental Illness Other      Diabetes Other      Glaucoma No family hx of      Macular Degeneration No family hx of        Medications:  Current Outpatient Medications   Medication Sig Dispense Refill     acetaminophen (TYLENOL) 325 MG tablet Take 325-650 mg by mouth as needed       acetylcysteine (N-ACETYL-L-CYSTEINE) 600 MG CAPS capsule 400 mg 1 cap daily 30 capsule      albuterol (PROAIR HFA/PROVENTIL HFA/VENTOLIN HFA) 108 (90 Base) MCG/ACT inhaler Inhale 2 puffs into the lungs every 6 hours as needed for shortness of breath / dyspnea or wheezing 1 Inhaler 5      ARIPiprazole (ABILIFY) 2 MG tablet Take 1 tablet (2 mg) by mouth daily 90 tablet 1     aspirin EC 81 MG EC tablet Take 1 tablet (81 mg) by mouth daily 30 tablet 5     blood glucose monitoring (ACCU-CHEK RALPH PLUS) meter device kit   0     blood glucose monitoring (NO BRAND SPECIFIED) meter device kit Use to test blood sugar 2 times daily or as directed. 1 kit 0     blood glucose monitoring (NO BRAND SPECIFIED) test strip Use to test blood sugars 2 times daily or as directed 100 strip 11     blood glucose monitoring (SOFTCLIX) lancets Use to test blood sugar 2 times daily or as directed. 1 Box 11     Cholecalciferol (VITAMIN D) 1000 UNITS capsule 1,000 Units  30 capsule      cyanocolbalamin (VITAMIN  B-12) 1000 MCG tablet Take 1 tablet by mouth daily. 30 tablet 0     cycloSPORINE modified (GENERIC EQUIVALENT) 25 MG capsule Take 5 capsules (125 mg) by mouth 2 times daily TAKE 5 CAPSULES (125MG) BY MOUTH TWO TIMES A  capsule 11     cycloSPORINE modified (GENERIC EQUIVALENT) 25 MG capsule Take 5 capsules (125 mg) by mouth 2 times daily 300 capsule 6     econazole nitrate 1 % cream Apply topically daily 85 g 3     estradiol (VAGIFEM) 10 MCG TABS vaginal tablet Place 1 tablet (10 mcg) vaginally twice a week 30 tablet 3     ferrous sulfate (FEROSUL) 325 (65 Fe) MG tablet Take 1 tablet (325 mg) by mouth daily (with breakfast) 100 tablet 0     fluticasone (FLONASE) 50 MCG/ACT nasal spray Spray 1 spray into both nostrils daily 48 g 3     furosemide (LASIX) 20 MG tablet Take 1 tablet (20 mg) by mouth daily 90 tablet 3     gabapentin 300 MG PO capsule Take 2 capsules (600 mg) by mouth At Bedtime 180 capsule 3     latanoprost (XALATAN) 0.005 % ophthalmic solution Place 1 drop into both eyes At Bedtime 7.5 mL 2     metFORMIN (GLUCOPHAGE-XR) 500 MG 24 hr tablet Take 3 tablets (1,500 mg) by mouth daily (with dinner) 270 tablet 2     mupirocin (BACTROBAN) 2 % external ointment Apply topically 3 times daily 1 g 0      mycophenolate (GENERIC EQUIVALENT) 250 MG capsule Take 4 capsules (1,000 mg) by mouth 2 times daily 240 capsule 11     omeprazole (PRILOSEC) 40 MG DR capsule Take 1 capsule (40 mg) by mouth daily 90 capsule 3     ondansetron (ZOFRAN-ODT) 4 MG ODT tab Take 1 tablet (4 mg) by mouth every 6 hours as needed 20 tablet 3     order for DME Equipment being ordered: DME  [id number]   Ankle support, sm, fig 8, lace up 1 Device 0     order for DME Walker with front wheels and a seat. 1 Units 0     Polyvinyl Alcohol-Povidone (REFRESH OP) Apply to eye as needed Both eyes       prazosin (MINIPRESS) 2 MG capsule Take 2mg cap + 3 x 5mg caps (15mg) at bedtime (total dose=17mg) 90 capsule 1     prazosin (MINIPRESS) 5 MG capsule Take 3 x 5mg (15mg) caps + 1 x 2mg caps at bedtime (total dose=17mg) 270 capsule 1     simvastatin (ZOCOR) 20 MG tablet Take 1 tablet (20 mg) by mouth At Bedtime 90 tablet 3     sulfamethoxazole-trimethoprim (BACTRIM/SEPTRA) 400-80 MG tablet Take 1 tablet by mouth daily 90 tablet 3     topiramate (TOPAMAX) 200 MG tablet Take 1 tablet (200 mg) by mouth 2 times daily 180 tablet 3     Vaginal Lubricant (REPLENS) GEL Use vaginally as needed. Can use up to 3 times per week. 35 g 11     vilazodone (VIIBRYD) 40 MG TABS tablet Take 1 tablet (40 mg) by mouth daily 90 tablet 1        Allergies   Allergen Reactions     Percocet [Oxycodone-Acetaminophen] Nausea and Vomiting     Novocain [Procaine Hcl] Hives     Had reaction 25 years ago to old renetta. Pt reports multiple injections of lidocaine since then without reaction.  Tolerated lidocaine injection today without difficulty.  Osmar Mark MD IR Service.         Review of Systems:  -As per HPI  -Constitutional: Otherwise feeling well today, in usual state of health.  -HEENT: Patient denies nonhealing oral sores.  -Skin: As above in HPI. No additional skin concerns.    Physical exam:  Vitals: LMP  (LMP Unknown)   GEN: This is a well developed, well-nourished  female in no acute distress, in a pleasant mood.    SKIN: Full skin including the undergarment areas was performed (female genitalia not examined, but buttocks examined). The exam included the head/face, neck, both arms, chest, back, abdomen, both legs, buttocks, digits and/or nails.   -Right medial thigh with a 4x3mm brown macule with slightly moth-eaten border; stable since last visit  -Multiple regular brown pigmented macules and papules are identified on the face, trunk, and extremities; no new lesions of concern  -There are waxy stuck on tan to brown papules on the face, trunk, and extremities.  -There are dome shaped bright red papules on the body and extremities  -No other lesions of concern on areas examined.     Impression/Plan:  1. Seborrheic keratosis, non irritated    Reassured benign etiology  2.  Cherry angioma(s)    Reassured benign etiology    3.    History of renal transplant on immunosuppression    No lesions of concern    Recommend yearly skin check    Sunscreen recommended    4. Benign melanocytic nevi of the trunk  - reassurance provided; no lesions concerning for malignancy  - photoprotection (regular use of SPF30+ broad spectrum sunscreen and sun protective clothing) recommended  - ABCDE of melanoma discussed    Follow-up in 1 year, earlier for new or changing lesions.     Staff only    Lonny Mcduffie MD  Staff Dermatologist and Dermatopathologist  , Department of Dermatology

## 2020-10-06 NOTE — PROGRESS NOTES
[facility] Dermatology Note      Dermatology Problem List:  1. Renal transplant 3/20/14, on cyclosporine and mycophenolate mofetil    Encounter Date: Oct 6, 2020    CC:   Chief Complaint   Patient presents with     Skin Check     Renal transplant . No spots of concern today.       History of Present Illness:  Ms. Elizabeth Palma is a 65 year old female who presents as a follow-up for skin chek. The patient was last seen 2019. History of renal transplant in , on cyclosporine and mycophenolate mofetil. She denies any new lesions of concern. No bleeding, tender, or growing lesions. No significant sun exposure. Otherwise no concerns.    Past Medical History:   Patient Active Problem List   Diagnosis     Hypertension     Hyperlipidemia     Abnormal MRI, cervical spine     Sensory loss     Premature menopause age 37     OP (osteoporosis) T score -3.8     Major depressive disorder, recurrent episode, moderate (H)     Generalized anxiety disorder     CMC DJD(carpometacarpal degenerative joint disease), localized primary     Pain in joint, forearm -- L unhealed Fx     -donor kidney transplant     Immunosuppressed status (H)     Hyperparathyroidism, secondary (H)     Hyperparathyroidism (H)     Senile osteoporosis     Pain in joint involving ankle and foot     Nightmares associated with chronic post-traumatic stress disorder     Type 2 diabetes mellitus with peripheral neuropathy (H)     Posttraumatic stress disorder     Right knee pain     Left knee pain     Age-related osteoporosis without current pathological fracture     Narcissistic personality disorder (H)     Personal history of other drug therapy     Median sensory neuropathy, left     Marital conflict     Suicidal ideation     Autosomal dominant polycystic kidney disease     Secondary hyperparathyroidism (H)     Anemia, iron deficiency     Past Medical History:   Diagnosis Date     Abnormal MRI, cervical spine 10/15/2011     2011; mild changes noted. Study done for left arm symptoms Impression:  1. Mild multilevel degenerative disc disease with no significant canal or neural stenosis seen. motion artifact on the STIR images in these are not interpretable. The remaining images were interpreted      Autosomal dominant polycystic kidney disease 2011     (Problem list name updated by automated process. Provider to review and confirm.)     CMC DJD(carpometacarpal degenerative joint disease), localized primary 3/5/2013     -donor kidney transplant 3/20/2014     Depressive disorder 11/15/2012     DM type 2 (diabetes mellitus, type 2) (H) 2013     Encounter for long-term (current) use of other medications 2015     Family history of tremor 10/17/2011     Gastroesophageal reflux disease      Generalized anxiety disorder 11/15/2012     Glaucoma      Hyperlipidemia 10/15/2011     Hyperparathyroidism, secondary (H) 2015     Hypertension     resolved     Immunosuppressed status (H) 3/20/2014     Major depressive disorder, recurrent episode, moderate (H) 11/15/2012     Obesity (BMI 30-39.9)      OP (osteoporosis) T score -3.8 2009 T-score -3.7      LION (obstructive sleep apnoea) 10/15/2012    intol to cpa     Pain in joint, forearm -- L unhealed Fx 2013     Premature menopause age 37 7/10/2012    OCP (vaginal bldg)-->HT which she stopped 2 mo later documented at 2007 visit (age 51).      Restless leg syndrome      Rib fractures 2013     Sensory loss 10/17/2011    Bottom of feet; uncertain if there is a neuropathy per notes.       Stiffness of joint, not elsewhere classified, hand 3/5/2013     Tremor 10/15/2011    head     Uncomplicated asthma      Past Surgical History:   Procedure Laterality Date     ABDOMEN SURGERY       ANKLE SURGERY       C TRANSPLANTATION OF KIDNEY  3/2014     C/SECTION, LOW TRANSVERSE      x 2     CHOLECYSTECTOMY       COLONOSCOPY       ESOPHAGOSCOPY, GASTROSCOPY,  DUODENOSCOPY (EGD), COMBINED N/A 5/19/2015    Procedure: COMBINED ESOPHAGOSCOPY, GASTROSCOPY, DUODENOSCOPY (EGD);  Surgeon: Sky Davey MD;  Location:  GI     ESOPHAGOSCOPY, GASTROSCOPY, DUODENOSCOPY (EGD), COMBINED N/A 5/19/2015    Procedure: COMBINED ESOPHAGOSCOPY, GASTROSCOPY, DUODENOSCOPY (EGD), BIOPSY SINGLE OR MULTIPLE;  Surgeon: Sky Davey MD;  Location:  GI     EYE SURGERY       LAPAROSCOPY, SURGICAL; REPAIR INCISIONAL OR VENTRAL HERNIA       ORTHOPEDIC SURGERY       HERMINIA EN Y BOWEL  1990     WRIST SURGERY         Social History:  Patient reports that she has quit smoking. She has never used smokeless tobacco. She reports that she does not drink alcohol or use drugs.    Family History:  Family History   Problem Relation Age of Onset     Mental Illness Other         family hx     Heart Disease Other      Diabetes Other      Cancer Other      Genetic Disorder Father      Mental Illness Father      Diabetes Father      Hypertension Father      Hyperlipidemia Mother      Diabetes Mother      Hypertension Mother      Mental Illness Other      Diabetes Other      Glaucoma No family hx of      Macular Degeneration No family hx of        Medications:  Current Outpatient Medications   Medication Sig Dispense Refill     acetaminophen (TYLENOL) 325 MG tablet Take 325-650 mg by mouth as needed       acetylcysteine (N-ACETYL-L-CYSTEINE) 600 MG CAPS capsule 400 mg 1 cap daily 30 capsule      albuterol (PROAIR HFA/PROVENTIL HFA/VENTOLIN HFA) 108 (90 Base) MCG/ACT inhaler Inhale 2 puffs into the lungs every 6 hours as needed for shortness of breath / dyspnea or wheezing 1 Inhaler 5     ARIPiprazole (ABILIFY) 2 MG tablet Take 1 tablet (2 mg) by mouth daily 90 tablet 1     aspirin EC 81 MG EC tablet Take 1 tablet (81 mg) by mouth daily 30 tablet 5     blood glucose monitoring (ACCU-CHEK RALPH PLUS) meter device kit   0     blood glucose monitoring (NO BRAND SPECIFIED) meter device kit Use to test  blood sugar 2 times daily or as directed. 1 kit 0     blood glucose monitoring (NO BRAND SPECIFIED) test strip Use to test blood sugars 2 times daily or as directed 100 strip 11     blood glucose monitoring (SOFTCLIX) lancets Use to test blood sugar 2 times daily or as directed. 1 Box 11     Cholecalciferol (VITAMIN D) 1000 UNITS capsule 1,000 Units  30 capsule      cyanocolbalamin (VITAMIN  B-12) 1000 MCG tablet Take 1 tablet by mouth daily. 30 tablet 0     cycloSPORINE modified (GENERIC EQUIVALENT) 25 MG capsule Take 5 capsules (125 mg) by mouth 2 times daily TAKE 5 CAPSULES (125MG) BY MOUTH TWO TIMES A  capsule 11     cycloSPORINE modified (GENERIC EQUIVALENT) 25 MG capsule Take 5 capsules (125 mg) by mouth 2 times daily 300 capsule 6     econazole nitrate 1 % cream Apply topically daily 85 g 3     estradiol (VAGIFEM) 10 MCG TABS vaginal tablet Place 1 tablet (10 mcg) vaginally twice a week 30 tablet 3     ferrous sulfate (FEROSUL) 325 (65 Fe) MG tablet Take 1 tablet (325 mg) by mouth daily (with breakfast) 100 tablet 0     fluticasone (FLONASE) 50 MCG/ACT nasal spray Spray 1 spray into both nostrils daily 48 g 3     furosemide (LASIX) 20 MG tablet Take 1 tablet (20 mg) by mouth daily 90 tablet 3     gabapentin 300 MG PO capsule Take 2 capsules (600 mg) by mouth At Bedtime 180 capsule 3     latanoprost (XALATAN) 0.005 % ophthalmic solution Place 1 drop into both eyes At Bedtime 7.5 mL 2     metFORMIN (GLUCOPHAGE-XR) 500 MG 24 hr tablet Take 3 tablets (1,500 mg) by mouth daily (with dinner) 270 tablet 2     mupirocin (BACTROBAN) 2 % external ointment Apply topically 3 times daily 1 g 0     mycophenolate (GENERIC EQUIVALENT) 250 MG capsule Take 4 capsules (1,000 mg) by mouth 2 times daily 240 capsule 11     omeprazole (PRILOSEC) 40 MG DR capsule Take 1 capsule (40 mg) by mouth daily 90 capsule 3     ondansetron (ZOFRAN-ODT) 4 MG ODT tab Take 1 tablet (4 mg) by mouth every 6 hours as needed 20 tablet 3      order for DME Equipment being ordered: DME  [id number]   Ankle support, sm, fig 8, lace up 1 Device 0     order for DME Walker with front wheels and a seat. 1 Units 0     Polyvinyl Alcohol-Povidone (REFRESH OP) Apply to eye as needed Both eyes       prazosin (MINIPRESS) 2 MG capsule Take 2mg cap + 3 x 5mg caps (15mg) at bedtime (total dose=17mg) 90 capsule 1     prazosin (MINIPRESS) 5 MG capsule Take 3 x 5mg (15mg) caps + 1 x 2mg caps at bedtime (total dose=17mg) 270 capsule 1     simvastatin (ZOCOR) 20 MG tablet Take 1 tablet (20 mg) by mouth At Bedtime 90 tablet 3     sulfamethoxazole-trimethoprim (BACTRIM/SEPTRA) 400-80 MG tablet Take 1 tablet by mouth daily 90 tablet 3     topiramate (TOPAMAX) 200 MG tablet Take 1 tablet (200 mg) by mouth 2 times daily 180 tablet 3     Vaginal Lubricant (REPLENS) GEL Use vaginally as needed. Can use up to 3 times per week. 35 g 11     vilazodone (VIIBRYD) 40 MG TABS tablet Take 1 tablet (40 mg) by mouth daily 90 tablet 1        Allergies   Allergen Reactions     Percocet [Oxycodone-Acetaminophen] Nausea and Vomiting     Novocain [Procaine Hcl] Hives     Had reaction 25 years ago to old renetta. Pt reports multiple injections of lidocaine since then without reaction.  Tolerated lidocaine injection today without difficulty.  Osmar Mark MD IR Service.         Review of Systems:  -As per HPI  -Constitutional: Otherwise feeling well today, in usual state of health.  -HEENT: Patient denies nonhealing oral sores.  -Skin: As above in HPI. No additional skin concerns.    Physical exam:  Vitals: LMP  (LMP Unknown)   GEN: This is a well developed, well-nourished female in no acute distress, in a pleasant mood.    SKIN: Full skin including the undergarment areas was performed (female genitalia not examined, but buttocks examined). The exam included the head/face, neck, both arms, chest, back, abdomen, both legs, buttocks, digits and/or nails.   -Right medial thigh with a 4x3mm brown  macule with slightly moth-eaten border; stable since last visit  -Multiple regular brown pigmented macules and papules are identified on the face, trunk, and extremities; no new lesions of concern  -There are waxy stuck on tan to brown papules on the face, trunk, and extremities.  -There are dome shaped bright red papules on the body and extremities  -No other lesions of concern on areas examined.     Impression/Plan:  1. Seborrheic keratosis, non irritated    Reassured benign etiology  2.  Cherry angioma(s)    Reassured benign etiology    3.    History of renal transplant on immunosuppression    No lesions of concern    Recommend yearly skin check    Sunscreen recommended    4. Benign melanocytic nevi of the trunk  - reassurance provided; no lesions concerning for malignancy  - photoprotection (regular use of SPF30+ broad spectrum sunscreen and sun protective clothing) recommended  - ABCDE of melanoma discussed    Follow-up in 1 year, earlier for new or changing lesions.     Staff only    Lonny Mcduffie MD  Staff Dermatologist and Dermatopathologist  , Department of Dermatology

## 2020-10-12 DIAGNOSIS — D84.9 IMMUNOSUPPRESSION (H): ICD-10-CM

## 2020-10-12 DIAGNOSIS — Z94.0 KIDNEY TRANSPLANTED: Primary | ICD-10-CM

## 2020-10-12 RX ORDER — SULFAMETHOXAZOLE AND TRIMETHOPRIM 400; 80 MG/1; MG/1
1 TABLET ORAL DAILY
Qty: 90 TABLET | Refills: 3 | Status: SHIPPED | OUTPATIENT
Start: 2020-10-12 | End: 2021-09-28

## 2020-10-19 ENCOUNTER — OFFICE VISIT (OUTPATIENT)
Dept: PODIATRY | Facility: CLINIC | Age: 65
End: 2020-10-19
Payer: MEDICARE

## 2020-10-19 VITALS — BODY MASS INDEX: 32.28 KG/M2 | WEIGHT: 171 LBS | HEIGHT: 61 IN

## 2020-10-19 DIAGNOSIS — M72.2 PLANTAR FASCIITIS, BILATERAL: ICD-10-CM

## 2020-10-19 DIAGNOSIS — L60.2 ONYCHAUXIS: Primary | ICD-10-CM

## 2020-10-19 DIAGNOSIS — E11.49 TYPE II OR UNSPECIFIED TYPE DIABETES MELLITUS WITH NEUROLOGICAL MANIFESTATIONS, NOT STATED AS UNCONTROLLED(250.60) (H): ICD-10-CM

## 2020-10-19 PROCEDURE — 99213 OFFICE O/P EST LOW 20 MIN: CPT | Mod: 25 | Performed by: PODIATRIST

## 2020-10-19 PROCEDURE — 11719 TRIM NAIL(S) ANY NUMBER: CPT | Performed by: PODIATRIST

## 2020-10-19 ASSESSMENT — MIFFLIN-ST. JEOR: SCORE: 1268.03

## 2020-10-19 NOTE — NURSING NOTE
"Elizabeth Palma's chief complaint for this visit includes:  Chief Complaint   Patient presents with     RECHECK     diabetic foot check      PCP: Horacio Sheridan    Referring Provider:  No referring provider defined for this encounter.    Ht 1.549 m (5' 1\")   Wt 77.6 kg (171 lb)   LMP  (LMP Unknown)   BMI 32.31 kg/m    Data Unavailable     Do you need any medication refills at today's visit? no      "

## 2020-10-19 NOTE — PROGRESS NOTES
Past Medical History:   Diagnosis Date     Abnormal MRI, cervical spine 10/15/2011    2011; mild changes noted. Study done for left arm symptoms Impression:  1. Mild multilevel degenerative disc disease with no significant canal or neural stenosis seen. motion artifact on the STIR images in these are not interpretable. The remaining images were interpreted      Autosomal dominant polycystic kidney disease 2011     (Problem list name updated by automated process. Provider to review and confirm.)     CMC DJD(carpometacarpal degenerative joint disease), localized primary 3/5/2013     -donor kidney transplant 3/20/2014     Depressive disorder 11/15/2012     DM type 2 (diabetes mellitus, type 2) (H) 2013     Encounter for long-term (current) use of other medications 2015     Family history of tremor 10/17/2011     Gastroesophageal reflux disease      Generalized anxiety disorder 11/15/2012     Glaucoma      Hyperlipidemia 10/15/2011     Hyperparathyroidism, secondary (H) 2015     Hypertension     resolved     Immunosuppressed status (H) 3/20/2014     Major depressive disorder, recurrent episode, moderate (H) 11/15/2012     Obesity (BMI 30-39.9)      OP (osteoporosis) T score -3.8 2009 T-score -3.7      LION (obstructive sleep apnoea) 10/15/2012    intol to cpa     Pain in joint, forearm -- L unhealed Fx 2013     Premature menopause age 37 7/10/2012    OCP (vaginal bldg)-->HT which she stopped 2 mo later documented at 2007 visit (age 51).      Restless leg syndrome      Rib fractures 2013     Sensory loss 10/17/2011    Bottom of feet; uncertain if there is a neuropathy per notes.       Stiffness of joint, not elsewhere classified, hand 3/5/2013     Tremor 10/15/2011    head     Uncomplicated asthma      Patient Active Problem List   Diagnosis     Hypertension     Hyperlipidemia     Abnormal MRI, cervical spine     Sensory loss     Premature menopause age 37      OP (osteoporosis) T score -3.8     Major depressive disorder, recurrent episode, moderate (H)     Generalized anxiety disorder     CMC DJD(carpometacarpal degenerative joint disease), localized primary     Pain in joint, forearm -- L unhealed Fx     -donor kidney transplant     Immunosuppressed status (H)     Hyperparathyroidism, secondary (H)     Hyperparathyroidism (H)     Senile osteoporosis     Pain in joint involving ankle and foot     Nightmares associated with chronic post-traumatic stress disorder     Type 2 diabetes mellitus with peripheral neuropathy (H)     Posttraumatic stress disorder     Right knee pain     Left knee pain     Age-related osteoporosis without current pathological fracture     Narcissistic personality disorder (H)     Personal history of other drug therapy     Median sensory neuropathy, left     Marital conflict     Suicidal ideation     Autosomal dominant polycystic kidney disease     Secondary hyperparathyroidism (H)     Anemia, iron deficiency     Past Surgical History:   Procedure Laterality Date     ABDOMEN SURGERY       ANKLE SURGERY       C TRANSPLANTATION OF KIDNEY  3/2014     C/SECTION, LOW TRANSVERSE      x 2     CHOLECYSTECTOMY       COLONOSCOPY       ESOPHAGOSCOPY, GASTROSCOPY, DUODENOSCOPY (EGD), COMBINED N/A 2015    Procedure: COMBINED ESOPHAGOSCOPY, GASTROSCOPY, DUODENOSCOPY (EGD);  Surgeon: Sky Davey MD;  Location:  GI     ESOPHAGOSCOPY, GASTROSCOPY, DUODENOSCOPY (EGD), COMBINED N/A 2015    Procedure: COMBINED ESOPHAGOSCOPY, GASTROSCOPY, DUODENOSCOPY (EGD), BIOPSY SINGLE OR MULTIPLE;  Surgeon: Sky Davey MD;  Location:  GI     EYE SURGERY       LAPAROSCOPY, SURGICAL; REPAIR INCISIONAL OR VENTRAL HERNIA       ORTHOPEDIC SURGERY       HERMINIA EN Y BOWEL       WRIST SURGERY       Social History     Socioeconomic History     Marital status:      Spouse name: Rahat     Number of children: 2     Years of education: Not on  file     Highest education level: Not on file   Occupational History     Comment: part-time   Social Needs     Financial resource strain: Not on file     Food insecurity     Worry: Not on file     Inability: Not on file     Transportation needs     Medical: Not on file     Non-medical: Not on file   Tobacco Use     Smoking status: Former Smoker     Smokeless tobacco: Never Used   Substance and Sexual Activity     Alcohol use: No     Alcohol/week: 0.0 standard drinks     Drug use: No     Sexual activity: Yes     Partners: Male     Birth control/protection: None     Comment: 1 partner   Lifestyle     Physical activity     Days per week: Not on file     Minutes per session: Not on file     Stress: Not on file   Relationships     Social connections     Talks on phone: Not on file     Gets together: Not on file     Attends Denominational service: Not on file     Active member of club or organization: Not on file     Attends meetings of clubs or organizations: Not on file     Relationship status: Not on file     Intimate partner violence     Fear of current or ex partner: Not on file     Emotionally abused: Not on file     Physically abused: Not on file     Forced sexual activity: Not on file   Other Topics Concern     Parent/sibling w/ CABG, MI or angioplasty before 65F 55M? Not Asked   Social History Narrative     Not on file     Family History   Problem Relation Age of Onset     Mental Illness Other         family hx     Heart Disease Other      Diabetes Other      Cancer Other      Genetic Disorder Father      Mental Illness Father      Diabetes Father      Hypertension Father      Hyperlipidemia Mother      Diabetes Mother      Hypertension Mother      Mental Illness Other      Diabetes Other      Glaucoma No family hx of      Macular Degeneration No family hx of      Lab Results   Component Value Date    A1C 6.7 09/03/2020    A1C 7.1 12/17/2019    A1C 7.2 04/24/2019    A1C 7.4 11/27/2018    A1C 6.4 01/19/2018      SUBJECTIVE FINDINGS:  A 65-year-old female returns to clinic for onychauxis bilaterally.  She is diabetic with peripheral neuropathy.  She relates she is still getting foot pain, neuropathy pain, and then foot pain on the bottom.  She relates no ulcers or sores since we have seen her last.  She needs her toenail care done.      OBJECTIVE FINDINGS:  DP and PT are 2/4 bilaterally.  She has decreased ankle joint dorsiflexion bilaterally.  She has dorsally contracted digits 1-5 bilaterally.  There is no erythema, no drainage, no odor, no calor bilaterally.  She has incurvated nails with some dystrophy and discoloration to differing degrees bilaterally and incurvation.  She relates the pain is on the bottom of her foot.  She relates at the end of the day, she has to use her walker because she has a hard time walking.      ASSESSMENT AND PLAN:  Onychauxis bilaterally.  Diabetes with peripheral neuropathy bilaterally.  Plantar fasciitis bilaterally, with some immobility at the end of the day, which she relates to.  Diagnosis and treatment options discussed with the patient.  All the nails were reduced bilaterally upon consent.  Prescription for physical therapy given and use discussed with her.  She is using her diabetic shoes and compression socks.  She will return to clinic and see me in about 3 months.

## 2020-10-19 NOTE — LETTER
10/19/2020         RE: Elizabeth Palma  [address]  [address]  [facility] [address]        Dear Colleague,    Thank you for referring your patient, Elizabeth Palma, to the [facility]. Please see a copy of my visit note below.    Past Medical History:   Diagnosis Date     Abnormal MRI, cervical spine 10/15/2011    2011; mild changes noted. Study done for left arm symptoms Impression:  1. Mild multilevel degenerative disc disease with no significant canal or neural stenosis seen. motion artifact on the STIR images in these are not interpretable. The remaining images were interpreted      Autosomal dominant polycystic kidney disease 2011     (Problem list name updated by automated process. Provider to review and confirm.)     CMC DJD(carpometacarpal degenerative joint disease), localized primary 3/5/2013     -donor kidney transplant 3/20/2014     Depressive disorder 11/15/2012     DM type 2 (diabetes mellitus, type 2) (H) 2013     Encounter for long-term (current) use of other medications 2015     Family history of tremor 10/17/2011     Gastroesophageal reflux disease      Generalized anxiety disorder 11/15/2012     Glaucoma      Hyperlipidemia 10/15/2011     Hyperparathyroidism, secondary (H) 2015     Hypertension     resolved     Immunosuppressed status (H) 3/20/2014     Major depressive disorder, recurrent episode, moderate (H) 11/15/2012     Obesity (BMI 30-39.9)      OP (osteoporosis) T score -3.8 2009 T-score -3.7      LION (obstructive sleep apnoea) 10/15/2012    intol to cpa     Pain in joint, forearm -- L unhealed Fx 2013     Premature menopause age 37 7/10/2012    OCP (vaginal bldg)-->HT which she stopped 2 mo later documented at 2007 visit (age 51).      Restless leg syndrome      Rib fractures 2013     Sensory loss 10/17/2011    Bottom of feet; uncertain if there is a neuropathy per notes.       Stiffness  of joint, not elsewhere classified, hand 3/5/2013     Tremor 10/15/2011    head     Uncomplicated asthma      Patient Active Problem List   Diagnosis     Hypertension     Hyperlipidemia     Abnormal MRI, cervical spine     Sensory loss     Premature menopause age 37     OP (osteoporosis) T score -3.8     Major depressive disorder, recurrent episode, moderate (H)     Generalized anxiety disorder     CMC DJD(carpometacarpal degenerative joint disease), localized primary     Pain in joint, forearm -- L unhealed Fx     -donor kidney transplant     Immunosuppressed status (H)     Hyperparathyroidism, secondary (H)     Hyperparathyroidism (H)     Senile osteoporosis     Pain in joint involving ankle and foot     Nightmares associated with chronic post-traumatic stress disorder     Type 2 diabetes mellitus with peripheral neuropathy (H)     Posttraumatic stress disorder     Right knee pain     Left knee pain     Age-related osteoporosis without current pathological fracture     Narcissistic personality disorder (H)     Personal history of other drug therapy     Median sensory neuropathy, left     Marital conflict     Suicidal ideation     Autosomal dominant polycystic kidney disease     Secondary hyperparathyroidism (H)     Anemia, iron deficiency     Past Surgical History:   Procedure Laterality Date     ABDOMEN SURGERY       ANKLE SURGERY       C TRANSPLANTATION OF KIDNEY  3/2014     C/SECTION, LOW TRANSVERSE      x 2     CHOLECYSTECTOMY       COLONOSCOPY       ESOPHAGOSCOPY, GASTROSCOPY, DUODENOSCOPY (EGD), COMBINED N/A 2015    Procedure: COMBINED ESOPHAGOSCOPY, GASTROSCOPY, DUODENOSCOPY (EGD);  Surgeon: Sky Davey MD;  Location: [facility]     ESOPHAGOSCOPY, GASTROSCOPY, DUODENOSCOPY (EGD), COMBINED N/A 2015    Procedure: COMBINED ESOPHAGOSCOPY, GASTROSCOPY, DUODENOSCOPY (EGD), BIOPSY SINGLE OR MULTIPLE;  Surgeon: Sky Davey MD;  Location:  GI     EYE SURGERY       LAPAROSCOPY,  SURGICAL; REPAIR INCISIONAL OR VENTRAL HERNIA       ORTHOPEDIC SURGERY       HERMINIA EN Y BOWEL  1990     WRIST SURGERY       Social History     Socioeconomic History     Marital status:      Spouse name: Rahat     Number of children: 2     Years of education: Not on file     Highest education level: Not on file   Occupational History     Comment: part-time   Social Needs     Financial resource strain: Not on file     Food insecurity     Worry: Not on file     Inability: Not on file     Transportation needs     Medical: Not on file     Non-medical: Not on file   Tobacco Use     Smoking status: Former Smoker     Smokeless tobacco: Never Used   Substance and Sexual Activity     Alcohol use: No     Alcohol/week: 0.0 standard drinks     Drug use: No     Sexual activity: Yes     Partners: Male     Birth control/protection: None     Comment: 1 partner   Lifestyle     Physical activity     Days per week: Not on file     Minutes per session: Not on file     Stress: Not on file   Relationships     Social connections     Talks on phone: Not on file     Gets together: Not on file     Attends Islam service: Not on file     Active member of club or organization: Not on file     Attends meetings of clubs or organizations: Not on file     Relationship status: Not on file     Intimate partner violence     Fear of current or ex partner: Not on file     Emotionally abused: Not on file     Physically abused: Not on file     Forced sexual activity: Not on file   Other Topics Concern     Parent/sibling w/ CABG, MI or angioplasty before 65F 55M? Not Asked   Social History Narrative     Not on file     Family History   Problem Relation Age of Onset     Mental Illness Other         family hx     Heart Disease Other      Diabetes Other      Cancer Other      Genetic Disorder Father      Mental Illness Father      Diabetes Father      Hypertension Father      Hyperlipidemia Mother      Diabetes Mother      Hypertension Mother       Mental Illness Other      Diabetes Other      Glaucoma No family hx of      Macular Degeneration No family hx of      Lab Results   Component Value Date    A1C 6.7 09/03/2020    A1C 7.1 12/17/2019    A1C 7.2 04/24/2019    A1C 7.4 11/27/2018    A1C 6.4 01/19/2018     SUBJECTIVE FINDINGS:  A 65-year-old female returns to clinic for onychauxis bilaterally.  She is diabetic with peripheral neuropathy.  She relates she is still getting foot pain, neuropathy pain, and then foot pain on the bottom.  She relates no ulcers or sores since we have seen her last.  She needs her toenail care done.      OBJECTIVE FINDINGS:  DP and PT are 2/4 bilaterally.  She has decreased ankle joint dorsiflexion bilaterally.  She has dorsally contracted digits 1-5 bilaterally.  There is no erythema, no drainage, no odor, no calor bilaterally.  She has incurvated nails with some dystrophy and discoloration to differing degrees bilaterally and incurvation.  She relates the pain is on the bottom of her foot.  She relates at the end of the day, she has to use her walker because she has a hard time walking.      ASSESSMENT AND PLAN:  Onychauxis bilaterally.  Diabetes with peripheral neuropathy bilaterally.  Plantar fasciitis bilaterally, with some immobility at the end of the day, which she relates to.  Diagnosis and treatment options discussed with the patient.  All the nails were reduced bilaterally upon consent.  Prescription for physical therapy given and use discussed with her.  She is using her diabetic shoes and compression socks.  She will return to clinic and see me in about 3 months.           Again, thank you for allowing me to participate in the care of your patient.        Sincerely,        Javier Tuttle DPM

## 2020-10-19 NOTE — PATIENT INSTRUCTIONS
Thanks for coming today.  Ortho[facility] Medicine Clinic  [address] [place] [address]    To schedule future appointments in Ortho Clinic, you may call [phone].    To schedule ordered imaging by your Provider: Call [facility] at [phone]    [employer] available online at:   [email or web address]    Please call if any further questions or concerns [phone] and ask for the Orthopedic Department. Clinic hours 8 am to 5 pm.    Return to clinic if symptoms worsen.

## 2020-10-22 ENCOUNTER — THERAPY VISIT (OUTPATIENT)
Dept: PHYSICAL THERAPY | Facility: CLINIC | Age: 65
End: 2020-10-22
Attending: PODIATRIST
Payer: MEDICARE

## 2020-10-22 DIAGNOSIS — E11.49 TYPE II OR UNSPECIFIED TYPE DIABETES MELLITUS WITH NEUROLOGICAL MANIFESTATIONS, NOT STATED AS UNCONTROLLED(250.60) (H): ICD-10-CM

## 2020-10-22 DIAGNOSIS — M72.2 PLANTAR FASCIITIS, BILATERAL: ICD-10-CM

## 2020-10-22 PROCEDURE — 97161 PT EVAL LOW COMPLEX 20 MIN: CPT | Mod: GP | Performed by: PHYSICAL THERAPIST

## 2020-10-22 PROCEDURE — 97110 THERAPEUTIC EXERCISES: CPT | Mod: GP | Performed by: PHYSICAL THERAPIST

## 2020-10-22 NOTE — PROGRESS NOTES
[facility] Initial Evaluation  Subjective:  Patient presents with bilateral foot pain.  Symptoms began several years ago but have become worse over the past year, without precipitating event.  Patient complains of plantar foot pain bilaterally.  It is worse with walking and as the day goes on, and with first few steps in the morning. Evening symptoms require patient to use walker. It is better with rest.  Symptoms affect balance.  Patient does have history neuropathy.    The history is provided by the patient.   Patient Health History  Elizabeth Palma being seen for physical therpay eval for feet.     Problem began: 10/22/2019.   Problem occurred: unknown    Pain is reported as 8/10 on pain scale.  General health as reported by patient is fair.  Pertinent medical history includes: asthma, depression, diabetes, high blood pressure, history of fractures, kidney disease, mental illness, menopausal, osteoporosis, osteoarthritis, overweight and sleep disorder/apnea.   Red flags:  Calf pain-swelling-warmth and pain at rest/night.     Surgeries include:  Orthopedic surgery and other. Other surgery history details: wrist and left ankle, kidney transplant.     Other medications details: anti rejection.    Current occupation is teacher.   Primary job tasks include:  Computer work.                                    Objective:  Standing Alignment:                Ankle/Foot:  Pes planus L, pes planus R, hammer toe L and hammer toe R    Gait:    Assistive Devices:  None  Deviations:  Ankle:  Pronation decr R          Ankle/Foot Evaluation  ROM:    AROM:    Dorsiflexion:  Left:   0  Right:   5  Plantarflexion:  Left:  58    Right:  70  Inversion:  Left:  NL     Right:  NL  Eversion:  NL     Right:  NL    Great Toe Extension:  Left:  NL     Right: NL    Strength:    Dorsiflexion:  Left: 4+/5     Pain:   Right: 4+/5   Pain:  Plantarflexion: Left: 4+/5   Pain:   Right: 4+/5  Pain:  Inversion:Left: 4/5  Pain:      Right: 5/5  Pain:  Eversion:Left: 4-/5  Pain:  Right: 4/5  Pain:              Strength wnl ankle: Can do 1/3rd range of single toe raise on left, unable to do at all on right.       PALPATION: Palpation of ankle: palpable hardward lateral left ankle from ORIF.  Left ankle tenderness present at:  plantar fascia and medial calcaneal  Right ankle tenderness present at:   plantar fascia and medial calcaneal      FUNCTIONAL TESTS:           Proprioception:  Stork Balance Test: Left: 1 sec  Right: 1 sec                                                     General     ROS    Assessment/Plan:    Patient is a 65 year old female with feet complaints.    Patient has the following significant findings with corresponding treatment plan.                Diagnosis 1:  Plantar foot pain bilaterally  Pain -  manual therapy, self management, education and home program  Decreased ROM/flexibility - manual therapy and therapeutic exercise  Decreased strength - therapeutic exercise and therapeutic activities  Impaired gait - gait training  Decreased function - therapeutic activities    Therapy Evaluation Codes:   1) History comprised of:   Personal factors that impact the plan of care:      None.    Comorbidity factors that impact the plan of care are:      None.     Medications impacting care: None.  2) Examination of Body Systems comprised of:   Body structures and functions that impact the plan of care:      foot.   Activity limitations that impact the plan of care are:      Walking.  3) Clinical presentation characteristics are:   Stable/Uncomplicated.  4) Decision-Making    Low complexity using standardized patient assessment instrument and/or measureable assessment of functional outcome.  Cumulative Therapy Evaluation is: Low complexity.    Previous and current functional limitations:  (See Goal Flow Sheet for this information)    Short term and Long term goals: (See Goal Flow Sheet for this information)     Communication ability:   Patient appears to be able to clearly communicate and understand verbal and written communication and follow directions correctly.  Treatment Explanation - The following has been discussed with the patient:   RX ordered/plan of care  Anticipated outcomes  Possible risks and side effects  This patient would benefit from PT intervention to resume normal activities.   Rehab potential is good.    Frequency:  1 X week, once daily  Duration:  for 10 weeks  Discharge Plan:  Achieve all LTG.  Independent in home treatment program.  Reach maximal therapeutic benefit.    Please refer to the daily flowsheet for treatment today, total treatment time and time spent performing 1:1 timed codes.

## 2020-10-22 NOTE — LETTER
DEPARTMENT OF HEALTH AND HUMAN SERVICES  [facility] FOR MED[facility]ARE & MEDICAID SERVICES    PLAN/UPDATED PLAN OF PROGRESS FOR OUTPATIENT REHABILITATION      PATIENTS NAME:  Elizabeth Palma   : 1955    PROVIDER NUMBER:    [id number]    HICN:  [id number]     PROVIDER NAME: [facility]    MEDICAL RECORD NUMBER: [id number]     START OF CARE DATE:  SOC Date: 10/22/20   TYPE:  PT    PRIMARY/TREATMENT DIAGNOSIS: (Pertinent Medical Diagnosis)     Type II or unspecified type diabetes mellitus with neurological manifestations, not stated as uncontrolled(250.60) (H)  Plantar fasciitis, bilateral    VISITS FROM START OF CARE:  Rxs Used: 1     [facility] Initial Evaluation  Subjective:  Patient presents with bilateral foot pain.  Symptoms began several years ago but have become worse over the past year, without precipitating event.  Patient complains of plantar foot pain bilaterally.  It is worse with walking and as the day goes on, and with first few steps in the morning. Evening symptoms require patient to use walker. It is better with rest.  Symptoms affect balance.  Patient does have history neuropathy.  The history is provided by the patient.     Patient Health History  Elizabeth Palma being seen for physical therpay eval for feet.   Date of Onset: 10/19/2020  Problem occurred: unknown    Pain is reported as 8/10 on pain scale.  General health as reported by patient is fair.  Pertinent medical history includes: asthma, depression, diabetes, high blood pressure, history of fractures, kidney disease, mental illness, menopausal, osteoporosis, osteoarthritis, overweight and sleep disorder/apnea.   Red flags:  Calf pain-swelling-warmth and pain at rest/night.     Surgeries include:  Orthopedic surgery and other. Other surgery history details: wrist and left ankle, kidney transplant.     Other medications details: anti rejection.    Current occupation is teacher.    Primary job tasks include:  Computer work.           PATIENTS NAME:  Elizabeth Palma   : 1955    Pertinent medical history includes: asthma, depression, diabetes, high blood pressure, history of fractures, kidney disease, menopausal, mental illness, osteoarthritis, osteoporosis, overweight and sleep disorder/apnea.   Red flags:  Calf pain-swelling-warmth and pain at rest/night.   Surgeries include:  Orthopedic surgery and other (Wrist, ankle, gastric bypass, kidney transplant 7 yrs ago).    Current medications:  Anti-depressants and bone density (Antirejection).    Current occupation is Teacher.   Primary job tasks include:  Computer work.              Objective:  Standing Alignment:    Ankle/Foot:  Pes planus L, pes planus R, hammer toe L and hammer toe R  Gait:    Assistive Devices:  None  Deviations:  Ankle:  Pronation decr R    Ankle/Foot Evaluation  ROM:    AROM:    Dorsiflexion:  Left:   0  Right:   5  Plantarflexion:  Left:  58    Right:  70  Inversion:  Left:  NL     Right:  NL  Eversion:  NL     Right:  NL  Great Toe Extension:  Left:  NL     Right: NL  Strength:    Dorsiflexion:  Left: 4+/5     Pain:   Right: 4+/5   Pain:  Plantarflexion: Left: 4+/5   Pain:   Right: 4+/5  Pain:  Inversion:Left: 4/5  Pain:     Right: 5/5  Pain:  Eversion:Left: 4-/5  Pain:  Right: 4/5  Pain:    Strength wnl ankle: Can do 1/3rd range of single toe raise on left, unable to do at all on right.     PALPATION: Palpation of ankle: palpable hardward lateral left ankle from ORIF.  Left ankle tenderness present at:  plantar fascia and medial calcaneal  Right ankle tenderness present at:   plantar fascia and medial calcaneal  FUNCTIONAL TESTS:   Proprioception:  Stork Balance Test: Left: 1 sec  Right: 1 sec            Assessment/Plan:    Patient is a 65 year old female with feet complaints.    Patient has the following significant findings with corresponding treatment plan.                Diagnosis 1:  Plantar foot pain  bilaterally  Pain -  manual therapy, self management, education and home program  Decreased ROM/flexibility - manual therapy and therapeutic exercise    PATIENTS NAME:  Elizabeth Palma   : 1955    Decreased strength - therapeutic exercise and therapeutic activities  Impaired gait - gait training  Decreased function - therapeutic activities    Therapy Evaluation Codes:   1) History comprised of:   Personal factors that impact the plan of care:      None.    Comorbidity factors that impact the plan of care are:      None.     Medications impacting care: None.  2) Examination of Body Systems comprised of:   Body structures and functions that impact the plan of care:      foot.   Activity limitations that impact the plan of care are:      Walking.  3) Clinical presentation characteristics are:   Stable/Uncomplicated.  4) Decision-Making    Low complexity using standardized patient assessment instrument and/or measureable assessment of functional outcome.  Cumulative Therapy Evaluation is: Low complexity.    Communication ability:  Patient appears to be able to clearly communicate and understand verbal and written communication and follow directions correctly.  Treatment Explanation - The following has been discussed with the patient:   RX ordered/plan of care  Anticipated outcomes  Possible risks and side effects  This patient would benefit from PT intervention to resume normal activities.   Rehab potential is good.  Frequency:  1 X week, once daily  Duration:  for 10 weeks  Discharge Plan:  Achieve all LTG.  Independent in home treatment program.  Reach maximal therapeutic benefit.                                  PATIENTS NAME:  Elizabeth Palma   : 1955    Caregiver Signature/Credentials _____________________________ Date ________       Treating Provider: Rosa Azevedo PT [facility]   I have reviewed and certified the need for these services and plan of treatment while under my care.    PHYSICIAN'S SIGNATURE:    "____________________________________  Date___________                     Javier Tuttle MD    Certification period:  Beginning of Cert date period: 10/22/20 to  End of Cert period date: 01/19/21     Functional Level Progress Report: Please see attached \"Goal Flow sheet for Functional level.\"    ____X____ Continue Services or ________ DC Services                Service dates: From  SOC Date: 10/22/20 date to present                         "

## 2020-10-23 NOTE — PROGRESS NOTES
[facility] Initial Evaluation  Subjective:    Patient Health History             Pertinent medical history includes: asthma, depression, diabetes, high blood pressure, history of fractures, kidney disease, menopausal, mental illness, osteoarthritis, osteoporosis, overweight and sleep disorder/apnea.   Red flags:  Calf pain-swelling-warmth and pain at rest/night.     Surgeries include:  Orthopedic surgery and other (Wrist, ankle, gastric bypass, kidney transplant 7 yrs ago).    Current medications:  Anti-depressants and bone density (Antirejection).    Current occupation is Teacher.   Primary job tasks include:  Computer work.                                    Objective:  System    Physical Exam    General     ROS    Assessment/Plan:

## 2020-10-26 ENCOUNTER — TELEPHONE (OUTPATIENT)
Dept: NURSING | Facility: CLINIC | Age: 65
End: 2020-10-26

## 2020-10-26 NOTE — TELEPHONE ENCOUNTER
".  [facility]: Nurse Triage Note  SITUATION/BACKGROUND                                                    Transferred from Call Center after scheduling appointment.   Elizabeth Palma is a 65 year old female who calls in to schedule an appointment with PCP, Dr. Sheridan. She reports that she has gained 3 lbs in the past 3 days. Weight is obtained every morning, same time after void. SOB only with exertion.   Denies any chest pain or SOB at this time. \"Just calling in to schedule appointment\".   This nurse advised patient to keep scheduled appointment on Wednesday for further evaluation of weight gain...   Routed to Primary Care to review and follow up call.       RECOMMENDATION/PLAN                                                      RECOMMENDED DISPOSITION:  Seek ED care with chest pain, feeling of suffocation, inability to speak, drooling, unable to swallow own salvia.: Yes    If further questions/concerns or if symptoms do not improve, worsen or new symptoms develop, call your PCP or [phone] to talk with the Resident on call, as soon as possible.    Guideline used: breathing problems  Telephone Triage Protocols for Nurses, Fifth Edition, Hermelinda Ortega, RN, RN  "

## 2020-10-27 ENCOUNTER — VIRTUAL VISIT (OUTPATIENT)
Dept: SLEEP MEDICINE | Facility: CLINIC | Age: 65
End: 2020-10-27
Attending: INTERNAL MEDICINE
Payer: MEDICARE

## 2020-10-27 DIAGNOSIS — F51.04 CHRONIC INSOMNIA: Primary | ICD-10-CM

## 2020-10-27 PROCEDURE — 90791 PSYCH DIAGNOSTIC EVALUATION: CPT | Mod: 95 | Performed by: PSYCHOLOGIST

## 2020-10-27 NOTE — TELEPHONE ENCOUNTER
Pt called and she states she is retaining a lot of fluids especially in her ankles. She is eating some salt but not any more than usual.    Denies shortness of breath  Chest pain  Drooling  No difficulties with swallowing   Has appt 10/28/20 with Dr Sheridan.  Clinic numbers given for questions or concerns.Umm Vieira RN 11:05 AM on 10/27/2020.

## 2020-10-27 NOTE — PATIENT INSTRUCTIONS
Recommendations include:    Sleep schedule of 11 PM-7 AM.  Use an alarm  Take maximum of 1 nap per day at 1 PM.  Set the alarm for 1 hour.  Take naps in bed.  Avoid napping after 7 PM.  Your  has agreed to help you maintain alertness during the evening prior to going to bed.  Think about activities you can do to get up from the sofa periodically to prevent inadvertent napping.  Rate your sleep from a 1-10 scale regarding your judgment of how well you stay to sleep.

## 2020-10-27 NOTE — PROGRESS NOTES
"Elizabeth Palma is a 65 year old female who is being evaluated via a billable video visit.      The patient has been notified of following:     \"This video visit will be conducted via a call between you and your physician/provider. We have found that certain health care needs can be provided without the need for an in-person physical exam.  This service lets us provide the care you need with a video conversation.  If a prescription is necessary we can send it directly to your pharmacy.  If lab work is needed we can place an order for that and you can then stop by our lab to have the test done at a later time.    Video visits are billed at different rates depending on your insurance coverage.  Please reach out to your insurance provider with any questions.    If during the course of the call the physician/provider feels a video visit is not appropriate, you will not be charged for this service.\"    Patient has given verbal consent for Video visit? Yes  How would you like to obtain your AVS? MyChart  If you are dropped from the video visit, the video invite should be resent to: Send to e-mail at: [email or web address]  Will anyone else be joining your video visit? No        Video-Visit Details    Type of service:  Video Visit    Video Start Time: 11:29 AM  Video End Time: 12:15 AM    Originating Location (pt. Location): Home    Distant Location (provider location):  [facility]     Platform used for Video Visit: [employer]    Juan Quintanilla PsyD    SLEEP MEDICINE CONSULTATION  Sleep Psychology    Name: Elizabeth Palma MRN# [id number]   Age: 65 year old YOB: 1955     Date of Consultation: Oct 27, 2020  Consultation is requested by: Tommy Jorgensen MD  [address]  [place] [address]  Primary care provider: Horacio Sheridan    Reason for Sleep Consultation     Elizabeth Palma is a 65 year old female seen today for a behavioral sleep medicine consultation because of " insomnia.      Assessment and Plan     Sleep Diagnoses/Recommendations:    1. Chronic insomnia  Insomnia is multi factored, associated with significant mental health conditions including depression, PTSD, other cooccurring health issues and reported history of mild obstructive sleep apnea, currently not treated.    Overall patient mental health conditions appear to be stable.  She is also being treated for recurrent PTSD associated nightmares with prazosin and is reporting moderate results.    Motivation for behavioral change has been demonstrated and that she eliminated use of screens and watching TV at the recommendation of previous sleep medicine provider.  Current to potential targets for behavioral sleep training include excessive time in bed and excessive napping inadequate overall stimulus control.  Patient was prescribed a sleep window of 8 hours between 11 PM-7 AM.  She will limit napping to a single nap at 1 PM and set an alarm for no more than 1 hour in bed.  She will work on avoiding inadvertent napping between 7 PM and her bedtime.  She enlisted the support of her  to help in neck prevention.  The goal is to improve sleep drive and reduce the frequency of waking and time awake in the middle of the night.      See patient instructions for initial treatment recommendations and behavioral sleep plan.    Summary Counseling:      Elizabeth was provided information about the pathophysiology of insomnia and psychophysiological factors contributing to the onset and maintenance of insomnia .  Treatment option were discussed including component of cognitive-behavioral therapy for insomnia. The benefits and potential early side effects of treatment including increased daytime sleepiness were discussed. She was advised to seek medical advise and consultation around use of or changes to prescription sleep medication, Patient was counseled on the importance of avoiding driving if drowsy.        Follow-up: 4  weeks     History of Present Sleep Complaint     Elizabeth Palma is a 65 year old year old female who reports referral from Dr. Tommy Jorgensen for insomnia.    From that consultation she stopped watching TV in bed and playing with phone at night.    She reports she goes to bed at 11 PM. Takes gabapentin at bedtime.  Also takes prazocin for nightmare.    Gets up between 7-730 AM     Naps during the day frequently.  Gets sleepy after lunch around 1 PM.  Usually naps  minutes.  Feels naps are refreshing.  There are inadvertent naps.    Usually have a late dinner at 7 PM and watch TV or watch movie.  Watch TV in reclining position.      Patient reports that she takes prazosin regularly and finds that it reduces the intensity of nightmares and the frequency as well.    Bed space is quiet and comfortable.  She sleeps with her .  She drinks 1 cup of caffeine in the morning.  She does not use alcohol in the evening.  No reported recreational drugs.    Patient does not report sleep specific worry or rumination.  She does not report nocturnal panic attacks.      Previous Sleep Studies:    Mei had initial sleep study in 201 11 which she states that the indicates of mild obstructive sleep apnea with an AHI of 6.2, RDI of 8.4 and prominence of REM hypopneas/apneas.      Screening          Fair[place] Sleepiness Scale  Total score - Fairburn: 14 .    JUAN RAMON Total Score: 24       PHQ-9 SCORE 6/25/2020   PHQ-9 Total Score -   PHQ-9 Total Score 14   Some encounter information is confidential and restricted. Go to Review Flowsheets activity to see all data.       NELDA-7 SCORE 5/4/2019 6/4/2019 9/19/2019   Total Score 2 (minimal anxiety) - -   Total Score 2 4 8       Patient Activation Score     ARNALDO Score (Last Two) 9/14/2015 10/7/2019   ARNALDO Raw Score 49 40   Activation Score 82.8 100   ARNALDO Level 4 4         Vitals     LMP  (LMP Unknown)      Medical History     Patient Active Problem List   Diagnosis     Hypertension      Hyperlipidemia     Abnormal MRI, cervical spine     Sensory loss     Premature menopause age 37     OP (osteoporosis) T score -3.8     Major depressive disorder, recurrent episode, moderate (H)     Generalized anxiety disorder     CMC DJD(carpometacarpal degenerative joint disease), localized primary     Pain in joint, forearm -- L unhealed Fx     -donor kidney transplant     Immunosuppressed status (H)     Hyperparathyroidism, secondary (H)     Hyperparathyroidism (H)     Senile osteoporosis     Pain in joint involving ankle and foot     Nightmares associated with chronic post-traumatic stress disorder     Type 2 diabetes mellitus with peripheral neuropathy (H)     Posttraumatic stress disorder     Plantar fasciitis, bilateral     Right knee pain     Left knee pain     Age-related osteoporosis without current pathological fracture     Narcissistic personality disorder (H)     Personal history of other drug therapy     Median sensory neuropathy, left     Marital conflict     Suicidal ideation     Autosomal dominant polycystic kidney disease     Secondary hyperparathyroidism (H)     Anemia, iron deficiency        Current Outpatient Medications   Medication Sig Dispense Refill     acetaminophen (TYLENOL) 325 MG tablet Take 325-650 mg by mouth as needed       acetylcysteine (N-ACETYL-L-CYSTEINE) 600 MG CAPS capsule 400 mg 1 cap daily 30 capsule      albuterol (PROAIR HFA/PROVENTIL HFA/VENTOLIN HFA) 108 (90 Base) MCG/ACT inhaler Inhale 2 puffs into the lungs every 6 hours as needed for shortness of breath / dyspnea or wheezing 1 Inhaler 5     ARIPiprazole (ABILIFY) 2 MG tablet Take 1 tablet (2 mg) by mouth daily 90 tablet 1     aspirin EC 81 MG EC tablet Take 1 tablet (81 mg) by mouth daily 30 tablet 5     blood glucose monitoring (ACCU-CHEK RALPH PLUS) meter device kit   0     blood glucose monitoring (NO BRAND SPECIFIED) meter device kit Use to test blood sugar 2 times daily or as directed. 1 kit 0     blood  glucose monitoring (NO BRAND SPECIFIED) test strip Use to test blood sugars 2 times daily or as directed 100 strip 11     blood glucose monitoring (SOFTCLIX) lancets Use to test blood sugar 2 times daily or as directed. 1 Box 11     Cholecalciferol (VITAMIN D) 1000 UNITS capsule 1,000 Units  30 capsule      cyanocolbalamin (VITAMIN  B-12) 1000 MCG tablet Take 1 tablet by mouth daily. 30 tablet 0     cycloSPORINE modified (GENERIC EQUIVALENT) 25 MG capsule Take 5 capsules (125 mg) by mouth 2 times daily TAKE 5 CAPSULES (125MG) BY MOUTH TWO TIMES A  capsule 11     cycloSPORINE modified (GENERIC EQUIVALENT) 25 MG capsule Take 5 capsules (125 mg) by mouth 2 times daily 300 capsule 6     econazole nitrate 1 % cream Apply topically daily 85 g 3     estradiol (VAGIFEM) 10 MCG TABS vaginal tablet Place 1 tablet (10 mcg) vaginally twice a week 30 tablet 3     ferrous sulfate (FEROSUL) 325 (65 Fe) MG tablet Take 1 tablet (325 mg) by mouth daily (with breakfast) 100 tablet 0     fluticasone (FLONASE) 50 MCG/ACT nasal spray Spray 1 spray into both nostrils daily 48 g 3     furosemide (LASIX) 20 MG tablet Take 1 tablet (20 mg) by mouth daily 90 tablet 3     gabapentin 300 MG PO capsule Take 2 capsules (600 mg) by mouth At Bedtime 180 capsule 3     latanoprost (XALATAN) 0.005 % ophthalmic solution Place 1 drop into both eyes At Bedtime 7.5 mL 2     metFORMIN (GLUCOPHAGE-XR) 500 MG 24 hr tablet Take 3 tablets (1,500 mg) by mouth daily (with dinner) 270 tablet 2     mupirocin (BACTROBAN) 2 % external ointment Apply topically 3 times daily 1 g 0     mycophenolate (GENERIC EQUIVALENT) 250 MG capsule Take 4 capsules (1,000 mg) by mouth 2 times daily 240 capsule 11     omeprazole (PRILOSEC) 40 MG DR capsule Take 1 capsule (40 mg) by mouth daily 90 capsule 3     ondansetron (ZOFRAN-ODT) 4 MG ODT tab Take 1 tablet (4 mg) by mouth every 6 hours as needed 20 tablet 3     order for DME Equipment being ordered: DME  [id number]    Ankle support, sm, fig 8, lace up 1 Device 0     order for DME Walker with front wheels and a seat. 1 Units 0     Polyvinyl Alcohol-Povidone (REFRESH OP) Apply to eye as needed Both eyes       prazosin (MINIPRESS) 2 MG capsule Take 2mg cap + 3 x 5mg caps (15mg) at bedtime (total dose=17mg) 90 capsule 1     prazosin (MINIPRESS) 5 MG capsule Take 3 x 5mg (15mg) caps + 1 x 2mg caps at bedtime (total dose=17mg) 270 capsule 1     simvastatin (ZOCOR) 20 MG tablet Take 1 tablet (20 mg) by mouth At Bedtime 90 tablet 3     sulfamethoxazole-trimethoprim (BACTRIM) 400-80 MG tablet Take 1 tablet by mouth daily 90 tablet 3     topiramate (TOPAMAX) 200 MG tablet Take 1 tablet (200 mg) by mouth 2 times daily 180 tablet 3     Vaginal Lubricant (REPLENS) GEL Use vaginally as needed. Can use up to 3 times per week. 35 g 11     vilazodone (VIIBRYD) 40 MG TABS tablet Take 1 tablet (40 mg) by mouth daily 90 tablet 1       Past Surgical History:   Procedure Laterality Date     ABDOMEN SURGERY       ANKLE SURGERY       C TRANSPLANTATION OF KIDNEY  3/2014     C/SECTION, LOW TRANSVERSE      x 2     CHOLECYSTECTOMY  1990     COLONOSCOPY       ESOPHAGOSCOPY, GASTROSCOPY, DUODENOSCOPY (EGD), COMBINED N/A 5/19/2015    Procedure: COMBINED ESOPHAGOSCOPY, GASTROSCOPY, DUODENOSCOPY (EGD);  Surgeon: Sky Davey MD;  Location:  GI     ESOPHAGOSCOPY, GASTROSCOPY, DUODENOSCOPY (EGD), COMBINED N/A 5/19/2015    Procedure: COMBINED ESOPHAGOSCOPY, GASTROSCOPY, DUODENOSCOPY (EGD), BIOPSY SINGLE OR MULTIPLE;  Surgeon: Sky Davey MD;  Location:  GI     EYE SURGERY       LAPAROSCOPY, SURGICAL; REPAIR INCISIONAL OR VENTRAL HERNIA       ORTHOPEDIC SURGERY       HERMINIA EN Y BOWEL  1990     WRIST SURGERY            Allergies   Allergen Reactions     Percocet [Oxycodone-Acetaminophen] Nausea and Vomiting     Novocain [Procaine Hcl] Hives     Had reaction 25 years ago to old renetta. Pt reports multiple injections of lidocaine since then  without reaction.  Tolerated lidocaine injection today without difficulty.  Osmar Mark MD IR Service.         Psychiatric History     Prior Psychiatric Diagnoses: yes, PTSD, major depressive disorder   Psychiatric Hospitalizations: none reported   Use of Psychotropics yes, see above      Chemical Use     Prior Chemical Dependency Treatment: none         Family History     Family History   Problem Relation Age of Onset     Mental Illness Other         family hx     Heart Disease Other      Diabetes Other      Cancer Other      Genetic Disorder Father      Mental Illness Father      Diabetes Father      Hypertension Father      Hyperlipidemia Mother      Diabetes Mother      Hypertension Mother      Mental Illness Other      Diabetes Other      Glaucoma No family hx of      Macular Degeneration No family hx of        Sleep Disorders: None reported    Social History     Social History     Socioeconomic History     Marital status:      Spouse name: Rahat     Number of children: 2     Years of education: None     Highest education level: None   Occupational History     Comment: part-time   Social Needs     Financial resource strain: None     Food insecurity     Worry: None     Inability: None     Transportation needs     Medical: None     Non-medical: None   Tobacco Use     Smoking status: Former Smoker     Smokeless tobacco: Never Used   Substance and Sexual Activity     Alcohol use: No     Alcohol/week: 0.0 standard drinks     Drug use: No     Sexual activity: Yes     Partners: Male     Birth control/protection: None     Comment: 1 partner   Lifestyle     Physical activity     Days per week: None     Minutes per session: None     Stress: None   Relationships     Social connections     Talks on phone: None     Gets together: None     Attends Buddhism service: None     Active member of club or organization: None     Attends meetings of clubs or organizations: None     Relationship status: None     Intimate  partner violence     Fear of current or ex partner: None     Emotionally abused: None     Physically abused: None     Forced sexual activity: None   Other Topics Concern     Parent/sibling w/ CABG, MI or angioplasty before 65F 55M? Not Asked   Social History Narrative     None            Mental Status Examination     Elizabeth presented as appropriately dressed and groomed and was oriented X3 with speech language intact.  The patient was cooperative throughout the evaluation with no signs of hallucinations, delusions, loosening of associations or other thought disturbance.  Mood was normal.  Affect was congruent with mood. Insight and judgement we intact.  Memory was intact for recent and remote elements.  There was no report of suicidal ideation, intention or plan. Attention and concentration were within normal.          Copy:   Horacio Sheridan MD  [address]  [place] [address]    Juan Quintanilla PsyD, LP, [facility]  Diplomate, Behavioral Sleep Medicine  [facility]  [place] [facility] [place]

## 2020-10-28 ENCOUNTER — VIRTUAL VISIT (OUTPATIENT)
Dept: INTERNAL MEDICINE | Facility: CLINIC | Age: 65
End: 2020-10-28
Payer: MEDICARE

## 2020-10-28 DIAGNOSIS — R63.5 ABNORMAL WEIGHT GAIN: Primary | ICD-10-CM

## 2020-10-28 DIAGNOSIS — Z94.0 DECEASED-DONOR KIDNEY TRANSPLANT: ICD-10-CM

## 2020-10-28 DIAGNOSIS — I50.31 ACUTE DIASTOLIC CONGESTIVE HEART FAILURE (H): ICD-10-CM

## 2020-10-28 DIAGNOSIS — I51.89 DIASTOLIC DYSFUNCTION: ICD-10-CM

## 2020-10-28 DIAGNOSIS — R60.9 FLUID RETENTION: ICD-10-CM

## 2020-10-28 PROCEDURE — 99214 OFFICE O/P EST MOD 30 MIN: CPT | Mod: 95 | Performed by: INTERNAL MEDICINE

## 2020-10-28 NOTE — NURSING NOTE
Chief Complaint   Patient presents with     Recheck Medication     pt would like to discuss retaining water for 3 weeks       Stepan Plaza CMA, EMT at 12:38 PM on 10/28/2020.

## 2020-10-28 NOTE — PROGRESS NOTES
"Elizabeth Palma is a 65 year old female who is being evaluated via a billable video visit.      The patient has been notified of following:     \"This video visit will be conducted via a call between you and your physician/provider. We have found that certain health care needs can be provided without the need for an in-person physical exam.  This service lets us provide the care you need with a video conversation.  If a prescription is necessary we can send it directly to your pharmacy.  If lab work is needed we can place an order for that and you can then stop by our lab to have the test done at a later time.    Video visits are billed at different rates depending on your insurance coverage.  Please reach out to your insurance provider with any questions.    If during the course of the call the physician/provider feels a video visit is not appropriate, you will not be charged for this service.\"    Patient has given verbal consent for Video visit? Yes  How would you like to obtain your AVS? MyChart  If you are dropped from the video visit, the video invite should be resent to: Text to cell phone: [phone]  Will anyone else be joining your video visit? No      SUBJECTIVE/OBJECTIVE:    Elizabeth Palma is a 65 year old female who presents to clinic today for the following health issues:    She is retaining a lot of water - fingers swollen.  She went to a chiropractor who said the ankles and feet are swollen.  She has gained weight - 20lbs in the last month.  Clothes do not fit.      She drinking a lot of water (8-10 glasses of 8oz water per day) and has been thirsty despite this.  She is urinating a lot less than normal and not as much as is coming in.  She is not eating more.    She has not happened before.    No medication changes.  She is on lasix daily but only sees an increase of a little bit.    The following have been reviewed:  Medication list  Allergies  Past medical/surgical history - kidney " transplant    ROS:  Fever - no fevers   - no dysuria, no change in urine color, no blood  Cardiac - no chest pain  Resp - no shortness of breath, does not exert self much - only on flat services - so hard to tell  Endo - not checking glucoses    Exam:   Gen - comfortable  Musc - she can not make a tight fist because the hand is swollen  Resp - breathing easily, full sentences    Echo - Interpretation Summary 9/15/2020  Hyperdynamic left ventricular function  The visual ejection fraction is estimated at >70%.  The study was technically difficult. Compared to the prior study dated 2013,  there have been no changes.    ASSESSMENT/PLAN:   Patient with increase in weight in a short time frame which makes me think it is water weight.  The reason is not clear although I worry that since she has a transplanted kidney that there could be problems with the kidney.  She had an echo a month ago so that seems less likely although not unreasonable to think she could have diastolic dysfunction since the study was difficult.  I don't have reason to suspect liver disease but this is a consideration.  It does not sound like her intake of water or food has change or that her meds have changed.    Check CMP, UA, U protein, BNP.  If all normal and/or high BNP, then try an increase in lasix from 20mg to 40mg.    Video-Visit Details    Type of service:  Video Visit    Video Start Time: 1:04p  Video End Time: 1:19p    Originating Location (pt. Location): Home    Distant Location (provider location):  [facility] INTER[facility] MEDIC[facility] [place]     Mode of Communication:  Video Conference via Claudia Sheridan MD, FACP

## 2020-10-28 NOTE — PROGRESS NOTES
"Elizabeth Palma is a 65 year old female who is being evaluated via a billable video visit.      The patient has been notified of following:     \"This video visit will be conducted via a call between you and your physician/provider. We have found that certain health care needs can be provided without the need for an in-person physical exam.  This service lets us provide the care you need with a video conversation.  If a prescription is necessary we can send it directly to your pharmacy.  If lab work is needed we can place an order for that and you can then stop by our lab to have the test done at a later time.    Video visits are billed at different rates depending on your insurance coverage.  Please reach out to your insurance provider with any questions.    If during the course of the call the physician/provider feels a video visit is not appropriate, you will not be charged for this service.\"    Patient has given verbal consent for Video visit? Yes  How would you like to obtain your AVS? MyChart  If you are dropped from the video visit, the video invite should be resent to: Text to cell phone: [phone]  Will anyone else be joining your video visit? No  {If patient encounters technical issues they should call [phone] :312600}    Subjective     Elizabeth Palma is a 65 year old female who presents today via video visit for the following health issues:    HPI     {SUPERLIST (Optional):286796}  {PEDS Chronic and Acute Problems (Optional):078717}     Video Start Time: {video visit start/end time for provider to select:699979}    {additonal problems for provider to add (Optional):946948}    Review of Systems   {ROS COMP (Optional):473936}      Objective           Vitals:  No vitals were obtained today due to virtual visit.    Physical Exam     {video visit exam brief selected:186646::\"GENERAL: Healthy, alert and no distress\",\"EYES: Eyes grossly normal to inspection.  No discharge or erythema, or obvious " "scleral/conjunctival abnormalities.\",\"RESP: No audible wheeze, cough, or visible cyanosis.  No visible retractions or increased work of breathing.  \",\"SKIN: Visible skin clear. No significant rash, abnormal pigmentation or lesions.\",\"NEURO: Cranial nerves grossly intact.  Mentation and speech appropriate for age.\",\"PSYCH: Mentation appears normal, affect normal/bright, judgement and insight intact, normal speech and appearance well-groomed.\"}      {Diagnostic Test Results (Optional):671675}        {PROVIDER CHARTING PREFERENCE:356661}      Video-Visit Details    Type of service:  Video Visit    Video End Time:{video visit start/end time for provider to select:152948}    Originating Location (pt. Location): {video visit patient location:602729::\"Home\"}    Distant Location (provider location):  [facility] INTERNAL MEDICINE [place]     Platform used for Video Visit: {Virtual Visit Platforms:978072::\"Boardvote\"}          "

## 2020-10-29 DIAGNOSIS — R63.5 ABNORMAL WEIGHT GAIN: ICD-10-CM

## 2020-10-29 DIAGNOSIS — I51.89 DIASTOLIC DYSFUNCTION: ICD-10-CM

## 2020-10-29 DIAGNOSIS — I50.31 ACUTE DIASTOLIC CONGESTIVE HEART FAILURE (H): ICD-10-CM

## 2020-10-29 DIAGNOSIS — R60.9 FLUID RETENTION: ICD-10-CM

## 2020-10-29 LAB
ALBUMIN UR-MCNC: NEGATIVE MG/DL
APPEARANCE UR: CLEAR
BACTERIA #/AREA URNS HPF: ABNORMAL /HPF
BILIRUB UR QL STRIP: NEGATIVE
COLOR UR AUTO: YELLOW
GLUCOSE UR STRIP-MCNC: 100 MG/DL
HGB UR QL STRIP: NEGATIVE
HYALINE CASTS #/AREA URNS LPF: ABNORMAL /LPF
KETONES UR STRIP-MCNC: NEGATIVE MG/DL
LEUKOCYTE ESTERASE UR QL STRIP: ABNORMAL
NITRATE UR QL: NEGATIVE
NON-SQ EPI CELLS #/AREA URNS LPF: ABNORMAL /LPF
NT-PROBNP SERPL-MCNC: 112 PG/ML (ref 0–125)
PH UR STRIP: 5 PH (ref 5–7)
PROT UR-MCNC: 0.07 G/L
PROT/CREAT 24H UR: 0.17 G/G CR (ref 0–0.2)
RBC #/AREA URNS AUTO: ABNORMAL /HPF
SOURCE: ABNORMAL
SP GR UR STRIP: 1.01 (ref 1–1.03)
UROBILINOGEN UR STRIP-ACNC: 0.2 EU/DL (ref 0.2–1)
WBC #/AREA URNS AUTO: ABNORMAL /HPF

## 2020-10-29 PROCEDURE — 80053 COMPREHEN METABOLIC PANEL: CPT | Performed by: INTERNAL MEDICINE

## 2020-10-29 PROCEDURE — 84156 ASSAY OF PROTEIN URINE: CPT | Performed by: INTERNAL MEDICINE

## 2020-10-29 PROCEDURE — 81001 URINALYSIS AUTO W/SCOPE: CPT | Performed by: INTERNAL MEDICINE

## 2020-10-29 PROCEDURE — 36415 COLL VENOUS BLD VENIPUNCTURE: CPT | Performed by: INTERNAL MEDICINE

## 2020-10-29 PROCEDURE — 83880 ASSAY OF NATRIURETIC PEPTIDE: CPT | Performed by: INTERNAL MEDICINE

## 2020-10-30 LAB
ALBUMIN SERPL-MCNC: 3.5 G/DL (ref 3.4–5)
ALP SERPL-CCNC: 55 U/L (ref 40–150)
ALT SERPL W P-5'-P-CCNC: 40 U/L (ref 0–50)
ANION GAP SERPL CALCULATED.3IONS-SCNC: 5 MMOL/L (ref 3–14)
AST SERPL W P-5'-P-CCNC: 17 U/L (ref 0–45)
BILIRUB SERPL-MCNC: 0.5 MG/DL (ref 0.2–1.3)
BUN SERPL-MCNC: 15 MG/DL (ref 7–30)
CALCIUM SERPL-MCNC: 8.6 MG/DL (ref 8.5–10.1)
CHLORIDE SERPL-SCNC: 107 MMOL/L (ref 94–109)
CO2 SERPL-SCNC: 25 MMOL/L (ref 20–32)
CREAT SERPL-MCNC: 0.98 MG/DL (ref 0.52–1.04)
GFR SERPL CREATININE-BSD FRML MDRD: 62 ML/MIN/{1.73_M2}
GLUCOSE SERPL-MCNC: 349 MG/DL (ref 70–99)
POTASSIUM SERPL-SCNC: 3.9 MMOL/L (ref 3.4–5.3)
PROT SERPL-MCNC: 6.1 G/DL (ref 6.8–8.8)
SODIUM SERPL-SCNC: 137 MMOL/L (ref 133–144)

## 2020-11-01 ENCOUNTER — MYC MEDICAL ADVICE (OUTPATIENT)
Dept: INTERNAL MEDICINE | Facility: CLINIC | Age: 65
End: 2020-11-01

## 2020-11-02 ENCOUNTER — THERAPY VISIT (OUTPATIENT)
Dept: PHYSICAL THERAPY | Facility: CLINIC | Age: 65
End: 2020-11-02
Payer: MEDICARE

## 2020-11-02 DIAGNOSIS — M72.2 PLANTAR FASCIITIS, BILATERAL: ICD-10-CM

## 2020-11-02 PROCEDURE — 97110 THERAPEUTIC EXERCISES: CPT | Mod: GP | Performed by: PHYSICAL THERAPIST

## 2020-11-02 PROCEDURE — 97140 MANUAL THERAPY 1/> REGIONS: CPT | Mod: GP | Performed by: PHYSICAL THERAPIST

## 2020-11-05 ENCOUNTER — OFFICE VISIT (OUTPATIENT)
Dept: OPHTHALMOLOGY | Facility: CLINIC | Age: 65
End: 2020-11-05
Attending: OPHTHALMOLOGY
Payer: MEDICARE

## 2020-11-05 DIAGNOSIS — H51.8 DIVERGENCE INSUFFICIENCY: ICD-10-CM

## 2020-11-05 DIAGNOSIS — H43.313 VITREOUS STRANDS OF BOTH EYES: ICD-10-CM

## 2020-11-05 DIAGNOSIS — H26.492 PCO (POSTERIOR CAPSULAR OPACIFICATION), LEFT: Primary | ICD-10-CM

## 2020-11-05 DIAGNOSIS — H04.123 DRY EYE SYNDROME OF BOTH EYES: ICD-10-CM

## 2020-11-05 PROCEDURE — G0463 HOSPITAL OUTPT CLINIC VISIT: HCPCS

## 2020-11-05 PROCEDURE — 99024 POSTOP FOLLOW-UP VISIT: CPT | Performed by: OPHTHALMOLOGY

## 2020-11-05 ASSESSMENT — VISUAL ACUITY
OS_CC+: -2
OD_CC: 20/25
OD_CC+: -2
METHOD: SNELLEN - LINEAR
CORRECTION_TYPE: GLASSES
OS_PH_CC+: +2
OS_CC: 20/40
OS_PH_CC: 20/25

## 2020-11-05 ASSESSMENT — EXTERNAL EXAM - LEFT EYE: OS_EXAM: NORMAL

## 2020-11-05 ASSESSMENT — REFRACTION_FINALRX
OD_HPRISM: 2 BO
OS_HPRISM: 2 BO

## 2020-11-05 ASSESSMENT — TONOMETRY
IOP_METHOD: TONOPEN
OS_IOP_MMHG: 11
OD_IOP_MMHG: 13

## 2020-11-05 ASSESSMENT — CONF VISUAL FIELD
OD_NORMAL: 1
OS_NORMAL: 1

## 2020-11-05 ASSESSMENT — EXTERNAL EXAM - RIGHT EYE: OD_EXAM: NORMAL

## 2020-11-05 ASSESSMENT — CUP TO DISC RATIO: OS_RATIO: 0.8

## 2020-11-05 NOTE — NURSING NOTE
Chief Complaints and History of Present Illnesses   Patient presents with     Laser Eye Surgery Left Eye     Chief Complaint(s) and History of Present Illness(es)     Laser Eye Surgery Left Eye     Laterality: left eye    Quality: blurred vision    Frequency: constantly    Context: dim lighting, near vision, distance vision, reading, watching TV, computer work, driving, night driving and mid-range vision    Associated symptoms: Negative for redness, tearing, burning, eye pain, dryness and double vision    Pain scale: 0/10              Comments     S/p SEVERING VITREOUS STRANDS of LE done 10/01/2020.  Bottled art tears every morning to BE.  No problems s/p laser, but significantly diminished vision since Fresnel applied at last visit. Pt does say that Fresnel eliminated diplopia.  Beverly Tavarez, COT COT 2:46 PM 11/05/2020

## 2020-11-05 NOTE — PROGRESS NOTES
Chief Complaint(s) and History of Present Illness(es)     Laser Eye Surgery Left Eye     Laterality: left eye    Quality: blurred vision    Frequency: constantly    Context: dim lighting, near vision, distance vision, reading, watching   TV, computer work, driving, night driving and mid-range vision    Associated symptoms: Negative for redness, tearing, burning, eye pain,   dryness and double vision    Pain scale: 0/10              Comments     S/p SEVERING VITREOUS STRANDS of LE done 10/01/2020.  Bottled art tears every morning to BE.  No problems s/p laser, but significantly diminished vision since Fresnel   applied at last visit. Pt does say that Fresnel eliminated diplopia.  Beverly Tavarez, COT COT 2:46 PM 11/05/2020                Review of systems for the eyes was negative other than the pertinent positives/negatives listed in the HPI.      Assessment & Plan      Elizabeth Palma is a 65 year old female with the following diagnoses:   1. PCO (posterior capsular opacification), left    2. Divergence insufficiency    3. Vitreous strands of both eyes    4. Dry eye syndrome of both eyes       Diplopia resolved, but more blur with Fresnel  Given new prescription to add prism to glasses    Floaters improved with last laser  Looks good today, visual axis clear  Discussed symptoms of retinal tear/detachment and the need to be seen urgently should they occur         Patient disposition:   Return in about 1 year (around 11/5/2021) for DFE.           Attending Physician Attestation:  Complete documentation of historical and exam elements from today's encounter can be found in the full encounter summary report (not reduplicated in this progress note).  I personally obtained the chief complaint(s) and history of present illness.  I confirmed and edited as necessary the review of systems, past medical/surgical history, family history, social history, and examination findings as documented by others; and I examined the  patient myself.  I personally reviewed the relevant tests, images, and reports as documented above.  I formulated and edited as necessary the assessment and plan and discussed the findings and management plan with the patient and family. . - Yonas Underwood MD

## 2020-11-09 ENCOUNTER — THERAPY VISIT (OUTPATIENT)
Dept: PHYSICAL THERAPY | Facility: CLINIC | Age: 65
End: 2020-11-09
Payer: MEDICARE

## 2020-11-09 DIAGNOSIS — M72.2 PLANTAR FASCIITIS, BILATERAL: ICD-10-CM

## 2020-11-09 PROCEDURE — 97140 MANUAL THERAPY 1/> REGIONS: CPT | Mod: GP | Performed by: PHYSICAL THERAPIST

## 2020-11-09 PROCEDURE — 97110 THERAPEUTIC EXERCISES: CPT | Mod: GP | Performed by: PHYSICAL THERAPIST

## 2020-11-13 ENCOUNTER — MYC MEDICAL ADVICE (OUTPATIENT)
Dept: INTERNAL MEDICINE | Facility: CLINIC | Age: 65
End: 2020-11-13

## 2020-11-13 ENCOUNTER — THERAPY VISIT (OUTPATIENT)
Dept: PHYSICAL THERAPY | Facility: CLINIC | Age: 65
End: 2020-11-13
Payer: MEDICARE

## 2020-11-13 DIAGNOSIS — M72.2 PLANTAR FASCIITIS, BILATERAL: ICD-10-CM

## 2020-11-13 PROCEDURE — 97140 MANUAL THERAPY 1/> REGIONS: CPT | Mod: GP | Performed by: PHYSICAL THERAPIST

## 2020-11-13 PROCEDURE — 97110 THERAPEUTIC EXERCISES: CPT | Mod: GP | Performed by: PHYSICAL THERAPIST

## 2020-11-20 DIAGNOSIS — Z94.0 KIDNEY TRANSPLANTED: ICD-10-CM

## 2020-11-20 DIAGNOSIS — D84.9 IMMUNOSUPPRESSION (H): ICD-10-CM

## 2020-11-20 RX ORDER — MYCOPHENOLATE MOFETIL 250 MG/1
1000 CAPSULE ORAL 2 TIMES DAILY
Qty: 240 CAPSULE | Refills: 11 | Status: SHIPPED | OUTPATIENT
Start: 2020-11-20 | End: 2021-07-29

## 2020-11-23 ENCOUNTER — THERAPY VISIT (OUTPATIENT)
Dept: PHYSICAL THERAPY | Facility: CLINIC | Age: 65
End: 2020-11-23
Payer: MEDICARE

## 2020-11-23 DIAGNOSIS — M72.2 PLANTAR FASCIITIS, BILATERAL: ICD-10-CM

## 2020-11-23 PROCEDURE — 97140 MANUAL THERAPY 1/> REGIONS: CPT | Mod: GP | Performed by: PHYSICAL THERAPIST

## 2020-11-23 PROCEDURE — 97110 THERAPEUTIC EXERCISES: CPT | Mod: GP | Performed by: PHYSICAL THERAPIST

## 2020-11-24 ENCOUNTER — TRANSFERRED RECORDS (OUTPATIENT)
Dept: HEALTH INFORMATION MANAGEMENT | Facility: CLINIC | Age: 65
End: 2020-11-24

## 2020-11-24 ENCOUNTER — MYC MEDICAL ADVICE (OUTPATIENT)
Dept: INTERNAL MEDICINE | Facility: CLINIC | Age: 65
End: 2020-11-24

## 2020-11-25 DIAGNOSIS — E87.70 HYPERVOLEMIA, UNSPECIFIED HYPERVOLEMIA TYPE: ICD-10-CM

## 2020-11-27 ENCOUNTER — THERAPY VISIT (OUTPATIENT)
Dept: PHYSICAL THERAPY | Facility: CLINIC | Age: 65
End: 2020-11-27
Payer: MEDICARE

## 2020-11-27 DIAGNOSIS — M72.2 PLANTAR FASCIITIS, BILATERAL: ICD-10-CM

## 2020-11-27 PROCEDURE — 97110 THERAPEUTIC EXERCISES: CPT | Mod: GP | Performed by: PHYSICAL THERAPIST

## 2020-11-27 PROCEDURE — 97140 MANUAL THERAPY 1/> REGIONS: CPT | Mod: GP | Performed by: PHYSICAL THERAPIST

## 2020-11-30 NOTE — TELEPHONE ENCOUNTER
METOLAZONE 5MG TABLETS  Last Written Prescription Date:  11/13/2020  Last Fill Quantity: 10,   # refills: 0  Last Office Visit : 10/28/2020  Future Office visit:  None  Routing refill request to provider for review/approval because:  Only 10 Tabs sent to pharm last order.       Would Provider like this Pt to continue this medication?   If so, Would the Provider like the Pt to take it every day?  Or just on a as to needs basis with some refills?    Please advise for refills.      Thank you         Khushboo Emerson RN  Central Triage Red Flags/Med Refills

## 2020-12-01 DIAGNOSIS — E87.70 HYPERVOLEMIA, UNSPECIFIED HYPERVOLEMIA TYPE: ICD-10-CM

## 2020-12-01 RX ORDER — METOLAZONE 5 MG/1
5 TABLET ORAL DAILY PRN
Qty: 30 TABLET | Refills: 3 | Status: SHIPPED | OUTPATIENT
Start: 2020-12-01 | End: 2021-03-31

## 2020-12-03 ENCOUNTER — VIRTUAL VISIT (OUTPATIENT)
Dept: PSYCHIATRY | Facility: CLINIC | Age: 65
End: 2020-12-03
Payer: MEDICARE

## 2020-12-03 DIAGNOSIS — F43.12 NIGHTMARES ASSOCIATED WITH CHRONIC POST-TRAUMATIC STRESS DISORDER: ICD-10-CM

## 2020-12-03 DIAGNOSIS — F33.1 MAJOR DEPRESSIVE DISORDER, RECURRENT EPISODE, MODERATE (H): ICD-10-CM

## 2020-12-03 DIAGNOSIS — F51.5 NIGHTMARES ASSOCIATED WITH CHRONIC POST-TRAUMATIC STRESS DISORDER: ICD-10-CM

## 2020-12-03 RX ORDER — ARIPIPRAZOLE 2 MG/1
2 TABLET ORAL DAILY
Qty: 90 TABLET | Refills: 1 | Status: SHIPPED | OUTPATIENT
Start: 2020-12-03 | End: 2021-03-04

## 2020-12-03 RX ORDER — VILAZODONE HYDROCHLORIDE 40 MG/1
40 TABLET ORAL DAILY
Qty: 90 TABLET | Refills: 1 | Status: SHIPPED | OUTPATIENT
Start: 2020-12-03 | End: 2021-07-02

## 2020-12-03 RX ORDER — PRAZOSIN HYDROCHLORIDE 5 MG/1
CAPSULE ORAL
Qty: 270 CAPSULE | Refills: 1 | Status: SHIPPED | OUTPATIENT
Start: 2020-12-03 | End: 2021-07-08

## 2020-12-03 RX ORDER — PRAZOSIN HYDROCHLORIDE 2 MG/1
CAPSULE ORAL
Qty: 90 CAPSULE | Refills: 1 | Status: SHIPPED | OUTPATIENT
Start: 2020-12-03 | End: 2021-06-02

## 2020-12-03 ASSESSMENT — PATIENT HEALTH QUESTIONNAIRE - PHQ9: SUM OF ALL RESPONSES TO PHQ QUESTIONS 1-9: 15
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
## 2020-12-04 ENCOUNTER — VIRTUAL VISIT (OUTPATIENT)
Dept: SLEEP MEDICINE | Facility: CLINIC | Age: 65
End: 2020-12-04
Payer: MEDICARE

## 2020-12-04 ENCOUNTER — THERAPY VISIT (OUTPATIENT)
Dept: PHYSICAL THERAPY | Facility: CLINIC | Age: 65
End: 2020-12-04
Payer: MEDICARE

## 2020-12-04 DIAGNOSIS — F51.04 CHRONIC INSOMNIA: Primary | ICD-10-CM

## 2020-12-04 DIAGNOSIS — M72.2 PLANTAR FASCIITIS, BILATERAL: ICD-10-CM

## 2020-12-04 PROCEDURE — 97110 THERAPEUTIC EXERCISES: CPT | Mod: GP | Performed by: PHYSICAL THERAPIST

## 2020-12-04 PROCEDURE — 97140 MANUAL THERAPY 1/> REGIONS: CPT | Mod: GP | Performed by: PHYSICAL THERAPIST

## 2020-12-04 PROCEDURE — 90832 PSYTX W PT 30 MINUTES: CPT | Performed by: PSYCHOLOGIST

## 2020-12-04 RX ORDER — METOLAZONE 5 MG/1
TABLET ORAL
Qty: 90 TABLET | OUTPATIENT
Start: 2020-12-04

## 2020-12-04 NOTE — PROGRESS NOTES
"Elizabeth Palma is a 65 year old female who is being evaluated via a billable video visit.      The patient has been notified of following:     \"This video visit will be conducted via a call between you and your physician/provider. We have found that certain health care needs can be provided without the need for an in-person physical exam.  This service lets us provide the care you need with a video conversation.  If a prescription is necessary we can send it directly to your pharmacy.  If lab work is needed we can place an order for that and you can then stop by our lab to have the test done at a later time.    Video visits are billed at different rates depending on your insurance coverage.  Please reach out to your insurance provider with any questions.    If during the course of the call the physician/provider feels a video visit is not appropriate, you will not be charged for this service.\"    Patient has given verbal consent for Video visit? Yes  How would you like to obtain your AVS? MyChart  If you are dropped from the video visit, the video invite should be resent to: Send to e-mail at: [email or web address]  Will anyone else be joining your video visit? No      Video-Visit Details    Type of service:  Video Visit    Video Start Time: 11:10 AM  Video End Time: 11:30 AM    Originating Location (pt. Location): Home    Distant Location (provider location):  [facility]     Platform used for Video Visit: [employer]    Juan Quintanilla PsyD    SLEEP MEDICINE VIRTUAL VIDEO FOLLOW-UP VISIT  Sleep Psychology    Patient Name: Elizabeth Palma  MRN:  [id number]  Date of Service: Dec 4, 2020       Subjective Report     Elizabeth Palma  returns for a telehealth video visit to discuss progress in implementing behavioral strategies for the management of insomnia.  Patient consent for initiation of video visit was obtained and documented prior to initiation of visit.     Elizabeth reports goes " "to bed at 11 PM, get up several times to get a drink a water and about 4 AM start having bad dreams.  She then wakes up every 20 minutes with bad dreams.  However she reports they are not nightmares but mostly \"weird dreams\"  Is up for the day by 7 AM.  Falling asleep within 15-20 minutes.    Discussed strategies to managing unsettling dreams when she awakens from these.  Introduced variation of imagery rehearsal technique to practice during the day.     .     Sleep Data:     Source of Sleep Estimates:  verbal self-report    Average Sleep Latency: 15 min.  Times Awakened: 1-2  Average Wake Time After Sleep Onset: 30-45 min.  Average Total Sleep Time:  approx 6 hrs  Sleep Efficiency: 80-85%%      .            Interventions     Strategies and recommendations including maintenance of stimulus control were discussed today.       Vital Signs     LMP  (LMP Unknown)      Mental Status     Appearance:  Well kept  Orientation:  X3  Mood:  normal  Affect:  Congruent with mood  Speech/Language:  Normal  Thought Process: Intact  Associations:  Normal  Thought Content: Normal  Patient does not report any suicidal ideation, intention or plan.    Diagnostic Impressions and Plan     Data Unavailable    Plan:  continue current sleep schedule and plan    Follow-up: 4 weeks      Juan Quintanilla PsyD, DERRICK, DBSM  Diplomate, Behavioral Sleep Medicine  [facility]      Note: This dictation was created using voice recognition software. This document may contain an error not identified before finalizing the document. If the error changes the accuracy of the document, I would appreciate it being brought to my attention.                             "

## 2020-12-07 ENCOUNTER — VIRTUAL VISIT (OUTPATIENT)
Dept: PODIATRY | Facility: CLINIC | Age: 65
End: 2020-12-07
Payer: MEDICARE

## 2020-12-07 DIAGNOSIS — B35.1 ONYCHOMYCOSIS: ICD-10-CM

## 2020-12-07 DIAGNOSIS — M79.674 TOE PAIN, RIGHT: ICD-10-CM

## 2020-12-07 DIAGNOSIS — E11.49 TYPE II OR UNSPECIFIED TYPE DIABETES MELLITUS WITH NEUROLOGICAL MANIFESTATIONS, NOT STATED AS UNCONTROLLED(250.60) (H): Primary | ICD-10-CM

## 2020-12-07 PROCEDURE — 99207 PR NO CHARGE LOS: CPT | Performed by: PODIATRIST

## 2020-12-07 RX ORDER — NYSTATIN AND TRIAMCINOLONE ACETONIDE 100000; 1 [USP'U]/G; MG/G
CREAM TOPICAL 2 TIMES DAILY
Qty: 30 G | Refills: 0 | Status: SHIPPED | OUTPATIENT
Start: 2020-12-07 | End: 2021-06-18

## 2020-12-07 NOTE — PROGRESS NOTES
"Elizabeth Palma is a 65 year old female who is being evaluated via a billable video visit.      The patient has been notified of following:     \"This video visit will be conducted via a call between you and your physician/provider. We have found that certain health care needs can be provided without the need for an in-person physical exam.  This service lets us provide the care you need with a video conversation.  If a prescription is necessary we can send it directly to your pharmacy.  If lab work is needed we can place an order for that and you can then stop by our lab to have the test done at a later time.    Video visits are billed at different rates depending on your insurance coverage.  Please reach out to your insurance provider with any questions.    If during the course of the call the physician/provider feels a video visit is not appropriate, you will not be charged for this service.\"    Patient has given verbal consent for Video visit? Yes  How would you like to obtain your AVS? MyChart  If you are dropped from the video visit, the video invite should be resent to: Email: [email or web address]  Will anyone else be joining your video visit? No            "

## 2020-12-07 NOTE — PROGRESS NOTES
Past Medical History:   Diagnosis Date     Abnormal MRI, cervical spine 10/15/2011    2011; mild changes noted. Study done for left arm symptoms Impression:  1. Mild multilevel degenerative disc disease with no significant canal or neural stenosis seen. motion artifact on the STIR images in these are not interpretable. The remaining images were interpreted      Autosomal dominant polycystic kidney disease 2011     (Problem list name updated by automated process. Provider to review and confirm.)     CMC DJD(carpometacarpal degenerative joint disease), localized primary 3/5/2013     -donor kidney transplant 3/20/2014     Depressive disorder 11/15/2012     DM type 2 (diabetes mellitus, type 2) (H) 2013     Encounter for long-term (current) use of other medications 2015     Family history of tremor 10/17/2011     Gastroesophageal reflux disease      Generalized anxiety disorder 11/15/2012     Glaucoma      Hyperlipidemia 10/15/2011     Hyperparathyroidism, secondary (H) 2015     Hypertension     resolved     Immunosuppressed status (H) 3/20/2014     Major depressive disorder, recurrent episode, moderate (H) 11/15/2012     Obesity (BMI 30-39.9)      OP (osteoporosis) T score -3.8 2009 T-score -3.7      LION (obstructive sleep apnoea) 10/15/2012    intol to cpa     Pain in joint, forearm -- L unhealed Fx 2013     Premature menopause age 37 7/10/2012    OCP (vaginal bldg)-->HT which she stopped 2 mo later documented at 2007 visit (age 51).      Restless leg syndrome      Rib fractures 2013     Sensory loss 10/17/2011    Bottom of feet; uncertain if there is a neuropathy per notes.       Stiffness of joint, not elsewhere classified, hand 3/5/2013     Tremor 10/15/2011    head     Uncomplicated asthma      Patient Active Problem List   Diagnosis     Hypertension     Hyperlipidemia     Abnormal MRI, cervical spine     Sensory loss     Premature menopause age 37      OP (osteoporosis) T score -3.8     Major depressive disorder, recurrent episode, moderate (H)     Generalized anxiety disorder     CMC DJD(carpometacarpal degenerative joint disease), localized primary     Pain in joint, forearm -- L unhealed Fx     -donor kidney transplant     Immunosuppressed status (H)     Hyperparathyroidism, secondary (H)     Hyperparathyroidism (H)     Senile osteoporosis     Pain in joint involving ankle and foot     Nightmares associated with chronic post-traumatic stress disorder     Type 2 diabetes mellitus with peripheral neuropathy (H)     Posttraumatic stress disorder     Plantar fasciitis, bilateral     Right knee pain     Left knee pain     Age-related osteoporosis without current pathological fracture     Narcissistic personality disorder (H)     Personal history of other drug therapy     Median sensory neuropathy, left     Marital conflict     Suicidal ideation     Autosomal dominant polycystic kidney disease     Secondary hyperparathyroidism (H)     Anemia, iron deficiency     Past Surgical History:   Procedure Laterality Date     ABDOMEN SURGERY       ANKLE SURGERY       C TRANSPLANTATION OF KIDNEY  2014     C/SECTION, LOW TRANSVERSE      x 2     CHOLECYSTECTOMY       COLONOSCOPY       ESOPHAGOSCOPY, GASTROSCOPY, DUODENOSCOPY (EGD), COMBINED N/A 2015    Procedure: COMBINED ESOPHAGOSCOPY, GASTROSCOPY, DUODENOSCOPY (EGD);  Surgeon: Sky Davey MD;  Location:  GI     ESOPHAGOSCOPY, GASTROSCOPY, DUODENOSCOPY (EGD), COMBINED N/A 2015    Procedure: COMBINED ESOPHAGOSCOPY, GASTROSCOPY, DUODENOSCOPY (EGD), BIOPSY SINGLE OR MULTIPLE;  Surgeon: Sky Davey MD;  Location:  GI     EYE SURGERY       LAPAROSCOPY, SURGICAL; REPAIR INCISIONAL OR VENTRAL HERNIA       LASER SURGERY OF EYE Left 10/01/2020    sever vitreous strands     ORTHOPEDIC SURGERY       HERMINIA EN Y BOWEL       WRIST SURGERY       Social History     Socioeconomic History      Marital status:      Spouse name: Rahat     Number of children: 2     Years of education: Not on file     Highest education level: Not on file   Occupational History     Comment: part-time   Social Needs     Financial resource strain: Not on file     Food insecurity     Worry: Not on file     Inability: Not on file     Transportation needs     Medical: Not on file     Non-medical: Not on file   Tobacco Use     Smoking status: Former Smoker     Smokeless tobacco: Never Used   Substance and Sexual Activity     Alcohol use: No     Alcohol/week: 0.0 standard drinks     Drug use: No     Sexual activity: Yes     Partners: Male     Birth control/protection: None     Comment: 1 partner   Lifestyle     Physical activity     Days per week: Not on file     Minutes per session: Not on file     Stress: Not on file   Relationships     Social connections     Talks on phone: Not on file     Gets together: Not on file     Attends Yarsanism service: Not on file     Active member of club or organization: Not on file     Attends meetings of clubs or organizations: Not on file     Relationship status: Not on file     Intimate partner violence     Fear of current or ex partner: Not on file     Emotionally abused: Not on file     Physically abused: Not on file     Forced sexual activity: Not on file   Other Topics Concern     Parent/sibling w/ CABG, MI or angioplasty before 65F 55M? Not Asked   Social History Narrative     Not on file     Family History   Problem Relation Age of Onset     Mental Illness Other         family hx     Heart Disease Other      Diabetes Other      Cancer Other      Genetic Disorder Father      Mental Illness Father      Diabetes Father      Hypertension Father      Hyperlipidemia Mother      Diabetes Mother      Hypertension Mother      Mental Illness Other      Diabetes Other      Glaucoma No family hx of      Macular Degeneration No family hx of      Lab Results   Component Value Date    A1C 6.7  09/03/2020    A1C 7.1 12/17/2019    A1C 7.2 04/24/2019    A1C 7.4 11/27/2018    A1C 6.4 01/19/2018     Last Comprehensive Metabolic Panel:  Sodium   Date Value Ref Range Status   10/29/2020 137 133 - 144 mmol/L Final     Potassium   Date Value Ref Range Status   10/29/2020 3.9 3.4 - 5.3 mmol/L Final     Chloride   Date Value Ref Range Status   10/29/2020 107 94 - 109 mmol/L Final     Carbon Dioxide   Date Value Ref Range Status   10/29/2020 25 20 - 32 mmol/L Final     Anion Gap   Date Value Ref Range Status   10/29/2020 5 3 - 14 mmol/L Final     Glucose   Date Value Ref Range Status   10/29/2020 349 (H) 70 - 99 mg/dL Final     Urea Nitrogen   Date Value Ref Range Status   10/29/2020 15 7 - 30 mg/dL Final     Creatinine   Date Value Ref Range Status   10/29/2020 0.98 0.52 - 1.04 mg/dL Final     GFR Estimate   Date Value Ref Range Status   10/29/2020 62 >60 mL/min/[1.73_m2] Final     Comment:     Non  GFR Calc  Starting 12/18/2018, serum creatinine based estimated GFR (eGFR) will be   calculated using the Chronic Kidney Disease Epidemiology Collaboration   (CKD-EPI) equation.       Calcium   Date Value Ref Range Status   10/29/2020 8.6 8.5 - 10.1 mg/dL Final     VIDEO VISIT      SUBJECTIVE FINDINGS:  Patient seen today on video visit for a bad toe.  She relates it hurts kind of on the medial right hallux.  It has been going on for a long time.  She went to urgent care.  They told her to soak it every 10 minutes all day long.  It still hurts.  She relates to no injuries.      OBJECTIVE FINDINGS:  It was difficult with the video, but she points to the medial hallux, just inferior to the nail border.  She has dystrophic, thickened, cracked skin and hallux nail.  She relates this is where it hurts.      ASSESSMENT AND PLAN:  Onychomycosis with ingrown toenail, right hallux.  Diagnosis and treatment options discussed with the patient.  Prescription for Mycolog cream given and use discussed with her.  I  advised her on foot soak.  She will return to clinic and see me for debridement this week or next.

## 2020-12-07 NOTE — PATIENT INSTRUCTIONS
Thanks for coming today.  [facility] Medicine Clinic  [address] [place] [address]    To schedule future appointments in Ortho Clinic, you may call [phone].    To schedule ordered imaging by your provider:   Call [facility] [phone]    To schedule an injection ordered by your provider:  Call [facility] Injection scheduling line: [phone]  [facility] available online at:  [email or web address]    Please call if any further questions or concerns ([phone]).  Clinic hours 8 am to 5 pm.    Return to clinic (call) if symptoms worsen or fail to improve.

## 2020-12-07 NOTE — LETTER
2020         RE: Elizabeth Palma  [address]  [address]  [facility] [address]        Dear Colleague,    Thank you for referring your patient, Elizabeth Palma, to the [facility]. Please see a copy of my visit note below.    Past Medical History:   Diagnosis Date     Abnormal MRI, cervical spine 10/15/2011    2011; mild changes noted. Study done for left arm symptoms Impression:  1. Mild multilevel degenerative disc disease with no significant canal or neural stenosis seen. motion artifact on the STIR images in these are not interpretable. The remaining images were interpreted      Autosomal dominant polycystic kidney disease 2011     (Problem list name updated by automated process. Provider to review and confirm.)     CMC DJD(carpometacarpal degenerative joint disease), localized primary 3/5/2013     -donor kidney transplant 3/20/2014     Depressive disorder 11/15/2012     DM type 2 (diabetes mellitus, type 2) (H) 2013     Encounter for long-term (current) use of other medications 2015     Family history of tremor 10/17/2011     Gastroesophageal reflux disease      Generalized anxiety disorder 11/15/2012     Glaucoma      Hyperlipidemia 10/15/2011     Hyperparathyroidism, secondary (H) 2015     Hypertension     resolved     Immunosuppressed status (H) 3/20/2014     Major depressive disorder, recurrent episode, moderate (H) 11/15/2012     Obesity (BMI 30-39.9)      OP (osteoporosis) T score -3.8 2009 T-score -3.7      LION (obstructive sleep apnoea) 10/15/2012    intol to cpa     Pain in joint, forearm -- L unhealed Fx 2013     Premature menopause age 37 7/10/2012    OCP (vaginal bldg)-->HT which she stopped 2 mo later documented at 2007 visit (age 51).      Restless leg syndrome      Rib fractures 2013     Sensory loss 10/17/2011    Bottom of feet; uncertain if there is a neuropathy per notes.       Stiffness  of joint, not elsewhere classified, hand 3/5/2013     Tremor 10/15/2011    head     Uncomplicated asthma      Patient Active Problem List   Diagnosis     Hypertension     Hyperlipidemia     Abnormal MRI, cervical spine     Sensory loss     Premature menopause age 37     OP (osteoporosis) T score -3.8     Major depressive disorder, recurrent episode, moderate (H)     Generalized anxiety disorder     CMC DJD(carpometacarpal degenerative joint disease), localized primary     Pain in joint, forearm -- L unhealed Fx     -donor kidney transplant     Immunosuppressed status (H)     Hyperparathyroidism, secondary (H)     Hyperparathyroidism (H)     Senile osteoporosis     Pain in joint involving ankle and foot     Nightmares associated with chronic post-traumatic stress disorder     Type 2 diabetes mellitus with peripheral neuropathy (H)     Posttraumatic stress disorder     Plantar fasciitis, bilateral     Right knee pain     Left knee pain     Age-related osteoporosis without current pathological fracture     Narcissistic personality disorder (H)     Personal history of other drug therapy     Median sensory neuropathy, left     Marital conflict     Suicidal ideation     Autosomal dominant polycystic kidney disease     Secondary hyperparathyroidism (H)     Anemia, iron deficiency     Past Surgical History:   Procedure Laterality Date     ABDOMEN SURGERY       ANKLE SURGERY       C TRANSPLANTATION OF KIDNEY  2014     C/SECTION, LOW TRANSVERSE      x 2     CHOLECYSTECTOMY       COLONOSCOPY       ESOPHAGOSCOPY, GASTROSCOPY, DUODENOSCOPY (EGD), COMBINED N/A 2015    Procedure: COMBINED ESOPHAGOSCOPY, GASTROSCOPY, DUODENOSCOPY (EGD);  Surgeon: Sky Davey MD;  Location:  GI     ESOPHAGOSCOPY, GASTROSCOPY, DUODENOSCOPY (EGD), COMBINED N/A 2015    Procedure: COMBINED ESOPHAGOSCOPY, GASTROSCOPY, DUODENOSCOPY (EGD), BIOPSY SINGLE OR MULTIPLE;  Surgeon: Sky Davey MD;  Location:  GI      EYE SURGERY       LAPAROSCOPY, SURGICAL; REPAIR INCISIONAL OR VENTRAL HERNIA       LASER SURGERY OF EYE Left 10/01/2020    sever vitreous strands     ORTHOPEDIC SURGERY       HERMINIA EN Y BOWEL  1990     WRIST SURGERY       Social History     Socioeconomic History     Marital status:      Spouse name: Rahat     Number of children: 2     Years of education: Not on file     Highest education level: Not on file   Occupational History     Comment: part-time   Social Needs     Financial resource strain: Not on file     Food insecurity     Worry: Not on file     Inability: Not on file     Transportation needs     Medical: Not on file     Non-medical: Not on file   Tobacco Use     Smoking status: Former Smoker     Smokeless tobacco: Never Used   Substance and Sexual Activity     Alcohol use: No     Alcohol/week: 0.0 standard drinks     Drug use: No     Sexual activity: Yes     Partners: Male     Birth control/protection: None     Comment: 1 partner   Lifestyle     Physical activity     Days per week: Not on file     Minutes per session: Not on file     Stress: Not on file   Relationships     Social connections     Talks on phone: Not on file     Gets together: Not on file     Attends Taoism service: Not on file     Active member of club or organization: Not on file     Attends meetings of clubs or organizations: Not on file     Relationship status: Not on file     Intimate partner violence     Fear of current or ex partner: Not on file     Emotionally abused: Not on file     Physically abused: Not on file     Forced sexual activity: Not on file   Other Topics Concern     Parent/sibling w/ CABG, MI or angioplasty before 65F 55M? Not Asked   Social History Narrative     Not on file     Family History   Problem Relation Age of Onset     Mental Illness Other         family hx     Heart Disease Other      Diabetes Other      Cancer Other      Genetic Disorder Father      Mental Illness Father      Diabetes Father       Hypertension Father      Hyperlipidemia Mother      Diabetes Mother      Hypertension Mother      Mental Illness Other      Diabetes Other      Glaucoma No family hx of      Macular Degeneration No family hx of      Lab Results   Component Value Date    A1C 6.7 09/03/2020    A1C 7.1 12/17/2019    A1C 7.2 04/24/2019    A1C 7.4 11/27/2018    A1C 6.4 01/19/2018     Last Comprehensive Metabolic Panel:  Sodium   Date Value Ref Range Status   10/29/2020 137 133 - 144 mmol/L Final     Potassium   Date Value Ref Range Status   10/29/2020 3.9 3.4 - 5.3 mmol/L Final     Chloride   Date Value Ref Range Status   10/29/2020 107 94 - 109 mmol/L Final     Carbon Dioxide   Date Value Ref Range Status   10/29/2020 25 20 - 32 mmol/L Final     Anion Gap   Date Value Ref Range Status   10/29/2020 5 3 - 14 mmol/L Final     Glucose   Date Value Ref Range Status   10/29/2020 349 (H) 70 - 99 mg/dL Final     Urea Nitrogen   Date Value Ref Range Status   10/29/2020 15 7 - 30 mg/dL Final     Creatinine   Date Value Ref Range Status   10/29/2020 0.98 0.52 - 1.04 mg/dL Final     GFR Estimate   Date Value Ref Range Status   10/29/2020 62 >60 mL/min/[1.73_m2] Final     Comment:     Non  GFR Calc  Starting 12/18/2018, serum creatinine based estimated GFR (eGFR) will be   calculated using the Chronic Kidney Disease Epidemiology Collaboration   (CKD-EPI) equation.       Calcium   Date Value Ref Range Status   10/29/2020 8.6 8.5 - 10.1 mg/dL Final     VIDEO VISIT      SUBJECTIVE FINDINGS:  Patient seen today on video visit for a bad toe.  She relates it hurts kind of on the medial right hallux.  It has been going on for a long time.  She went to urgent care.  They told her to soak it every 10 minutes all day long.  It still hurts.  She relates to no injuries.      OBJECTIVE FINDINGS:  It was difficult with the video, but she points to the medial hallux, just inferior to the nail border.  She has dystrophic, thickened, cracked skin  "and hallux nail.  She relates this is where it hurts.      ASSESSMENT AND PLAN:  Onychomycosis with ingrown toenail, right hallux.  Diagnosis and treatment options discussed with the patient.  Prescription for Mycolog cream given and use discussed with her.  I advised her on foot soak.  She will return to clinic and see me for debridement this week or next.         Elizabeth Palma is a 65 year old female who is being evaluated via a billable video visit.      The patient has been notified of following:     \"This video visit will be conducted via a call between you and your physician/provider. We have found that certain health care needs can be provided without the need for an in-person physical exam.  This service lets us provide the care you need with a video conversation.  If a prescription is necessary we can send it directly to your pharmacy.  If lab work is needed we can place an order for that and you can then stop by our lab to have the test done at a later time.    Video visits are billed at different rates depending on your insurance coverage.  Please reach out to your insurance provider with any questions.    If during the course of the call the physician/provider feels a video visit is not appropriate, you will not be charged for this service.\"    Patient has given verbal consent for Video visit? Yes  How would you like to obtain your AVS? MyChart  If you are dropped from the video visit, the video invite should be resent to: Email: [email or web address]  Will anyone else be joining your video visit? No                Again, thank you for allowing me to participate in the care of your patient.        Sincerely,        Javier Tuttle DPM    "

## 2020-12-10 ENCOUNTER — PRE VISIT (OUTPATIENT)
Dept: NEUROLOGY | Facility: CLINIC | Age: 65
End: 2020-12-10

## 2020-12-10 NOTE — TELEPHONE ENCOUNTER
FUTURE VISIT INFORMATION      FUTURE VISIT INFORMATION:    Date: 12/11/2020    Time: 3pm    [place] [facility] [place]:    Referring provider:  Self    Referring providers clinic:      Reason for visit/diagnosis  Tremors    RECORDS REQUESTED FROM:       Clinic name Comments Records Status Imaging Status   [facility] CT Head-4/6/2019 Care Everywhere PACS         Internal MR Brain and Cervical Spine-4/22/2019 Epic PACS

## 2020-12-11 ENCOUNTER — MYC REFILL (OUTPATIENT)
Dept: INTERNAL MEDICINE | Facility: CLINIC | Age: 65
End: 2020-12-11

## 2020-12-11 ENCOUNTER — VIRTUAL VISIT (OUTPATIENT)
Dept: NEUROLOGY | Facility: CLINIC | Age: 65
End: 2020-12-11
Payer: MEDICARE

## 2020-12-11 DIAGNOSIS — N18.5 CHRONIC KIDNEY DISEASE, STAGE V (H): ICD-10-CM

## 2020-12-11 DIAGNOSIS — R25.1 TREMOR: Primary | ICD-10-CM

## 2020-12-11 PROCEDURE — 99214 OFFICE O/P EST MOD 30 MIN: CPT | Mod: 95 | Performed by: PSYCHIATRY & NEUROLOGY

## 2020-12-11 NOTE — LETTER
"12/11/2020       RE: Elizabeth Palma  [address]  [address]  [facility] [address]     Dear Colleague,    Thank you for referring your patient, Elizabeth Palma, to the [facility] [place] at [facility]. Please see a copy of my visit note below.    Elizabeth Palma is a 65 year old female who is being evaluated via a billable video visit.      The patient has been notified of following:     \"This video visit will be conducted via a call between you and your physician/provider. We have found that certain health care needs can be provided without the need for an in-person physical exam.  This service lets us provide the care you need with a video conversation.  If a prescription is necessary we can send it directly to your pharmacy.  If lab work is needed we can place an order for that and you can then stop by our lab to have the test done at a later time.    Video visits are billed at different rates depending on your insurance coverage.  Please reach out to your insurance provider with any questions.    If during the course of the call the physician/provider feels a video visit is not appropriate, you will not be charged for this service.\"    Patient has given verbal consent for Video visit?YES  How would you like to obtain your AVS? Mychart      Video-Visit Details    Type of service:  Video Visit  30 min  myrna    Platform used for Video Visit: Angela Purvis, EMT              Again, thank you for allowing me to participate in the care of your patient.      Sincerely,    Frantz Jacome MD      "

## 2020-12-11 NOTE — LETTER
"2020      RE: Elizabeth Palma  [address]  [address]  [facility] [address]       Elizabeth Palma is a 65 year old female who is being evaluated via a billable video visit.      The patient has been notified of following:     \"This video visit will be conducted via a call between you and your physician/provider. We have found that certain health care needs can be provided without the need for an in-person physical exam.  This service lets us provide the care you need with a video conversation.  If a prescription is necessary we can send it directly to your pharmacy.  If lab work is needed we can place an order for that and you can then stop by our lab to have the test done at a later time.    Video visits are billed at different rates depending on your insurance coverage.  Please reach out to your insurance provider with any questions.    If during the course of the call the physician/provider feels a video visit is not appropriate, you will not be charged for this service.\"    Patient has given verbal consent for Video visit?YES  How would you like to obtain your AVS? Mychart      Video-Visit Details    Type of service:  Video Visit  30 min  [employer]    Platform used for Video Visit: Angela Purvis, FABY            Service Date: 2020     Horacio Sheridan MD     Physicians    [address] [facility] [facility] [address]    [place]  [address]       RE: Elizabeth Palma   MRN: [id number]   : 1955      Dear Dr. Sheridan:      We had the privilege of evaluating Mrs. Elizabeth Palma, a 65-year-old woman who has had a kidney transplant and who requested a neurological opinion regarding her increasing tremors and some jerking movements.  The history was reviewed with her.  There was a man with her who was also helpful in positioning the camera.  She tells me that she has these tremors of the arms and the hands, which makes it very difficult for her to do things, and these have also increased over the last few " months, especially in the right hand.  In addition to that and seemingly new for the last few months, she has had this jerking of her extremities and she has had them in her legs and feet and she can throw things, and these jerks occasionally occur and have been associated with some increase in diuretics due to retention of fluids.  She does not have any history of seizures, convulsions or other neurological problem.  She had been seen in the [facility] and classified as a postural tremor and this was felt to be related to cyclosporine.  Her cyclosporine dose has not been changed.  She also has had some gait disorder, ataxia that has been also associated with that.  There is no family history of seizures, myoclonus or any other neurological problem.  She has not had any new medications.      She has had physical therapy.  She has had for a long time numbness of the hands or feet.  She has not had any imaging of the brain for a while, according to her.      The patient had an MRI of the cervical spine by Dr. Borja ordered when he evaluated her and this was unremarkable, and she had an MRI of the brain done in April of last year and that was normal for age except for some sinus on the right.      I do not see she has had EEG studies and records do not show she has ever had an EEG and no history of seizures.      CURRENT MEDICATIONS:  Albuterol, Abilify, aspirin, B12, cyclosporine, which it seems the dose has been stable, and iron, fluticasone, furosemide, gabapentin, metformin, Zaroxolyn, omeprazole and others including Topamax 200 twice a day, not sure what it is she on for that.      PHYSICAL EXAMINATION:  Exam was possible with video.  Blood pressure is 137/82.  She does have profound postural tremor of her hands, especially the right.  Finger-to-nose testing is well done, so are rapid alternating movements.  Her gait is a little slow and hesitant, but she can get up on her toes.  She does sway on  the Romberg.  There is no obvious myoclonus jerks seen on today's visit.  Her cranial nerve exam is normal and face is symmetrical.  There is no myoclonus of the palate that can be identified by her assistant or by myself.      In summary, the patient has postural tremor and some gait ataxia which has been evaluated and felt to be related to the cyclosporine.  We would need to check a cyclosporine level and see how that looks.  She has not had an imaging study of her brain for a while.  An EEG may be considered, but I doubt this myoclonus is cortical.  We will do a CT scan plain of her head and then consider after that medical options, including EEG, and she says this happened with increased fluid, so I will see her after the CT scan.        Sincerely,     Frantz Jacome MD        D: 2020   T: 2020   MT: ROSALBA      Name:     ANITA ULRICH   MRN:      [id number]        Account:      [id number]   :      1955           Service Date: 2020      Document: [id number]

## 2020-12-11 NOTE — PROGRESS NOTES
"Elizabeth Palma is a 65 year old female who is being evaluated via a billable video visit.      The patient has been notified of following:     \"This video visit will be conducted via a call between you and your physician/provider. We have found that certain health care needs can be provided without the need for an in-person physical exam.  This service lets us provide the care you need with a video conversation.  If a prescription is necessary we can send it directly to your pharmacy.  If lab work is needed we can place an order for that and you can then stop by our lab to have the test done at a later time.    Video visits are billed at different rates depending on your insurance coverage.  Please reach out to your insurance provider with any questions.    If during the course of the call the physician/provider feels a video visit is not appropriate, you will not be charged for this service.\"    Patient has given verbal consent for Video visit?YES  How would you like to obtain your AVS? Mychart      Video-Visit Details    Type of service:  Video Visit  30 min  [employer]    Platform used for Video Visit: Angela Purvis, EMT          "

## 2020-12-12 NOTE — PROGRESS NOTES
Service Date: 2020      Horacio Sheridan MD     Physicians    [address] [facility] [facility] [address]    [place]  [address]       RE: Elizabeth Palma   MRN: [id number]   : 1955      Dear Dr. Sheridan:      We had the privilege of evaluating Mrs. Elizabeth Palma, a 65-year-old woman who has had a kidney transplant and who requested a neurological opinion regarding her increasing tremors and some jerking movements.  The history was reviewed with her.  There was a man with her who was also helpful in positioning the camera.  She tells me that she has these tremors of the arms and the hands, which makes it very difficult for her to do things, and these have also increased over the last few months, especially in the right hand.  In addition to that and seemingly new for the last few months, she has had this jerking of her extremities and she has had them in her legs and feet and she can throw things, and these jerks occasionally occur and have been associated with some increase in diuretics due to retention of fluids.  She does not have any history of seizures, convulsions or other neurological problem.  She had been seen in the [facility] and classified as a postural tremor and this was felt to be related to cyclosporine.  Her cyclosporine dose has not been changed.  She also has had some gait disorder, ataxia that has been also associated with that.  There is no family history of seizures, myoclonus or any other neurological problem.  She has not had any new medications.      She has had physical therapy.  She has had for a long time numbness of the hands or feet.  She has not had any imaging of the brain for a while, according to her.      The patient had an MRI of the cervical spine by Dr. Borja ordered when he evaluated her and this was unremarkable, and she had an MRI of the brain done in April of last year and that was normal for age except for some sinus on the right.      I  do not see she has had EEG studies and records do not show she has ever had an EEG and no history of seizures.      CURRENT MEDICATIONS:  Albuterol, Abilify, aspirin, B12, cyclosporine, which it seems the dose has been stable, and iron, fluticasone, furosemide, gabapentin, metformin, Zaroxolyn, omeprazole and others including Topamax 200 twice a day, not sure what it is she on for that.      PHYSICAL EXAMINATION:  Exam was possible with video.  Blood pressure is 137/82.  She does have profound postural tremor of her hands, especially the right.  Finger-to-nose testing is well done, so are rapid alternating movements.  Her gait is a little slow and hesitant, but she can get up on her toes.  She does sway on the Romberg.  There is no obvious myoclonus jerks seen on today's visit.  Her cranial nerve exam is normal and face is symmetrical.  There is no myoclonus of the palate that can be identified by her assistant or by myself.      In summary, the patient has postural tremor and some gait ataxia which has been evaluated and felt to be related to the cyclosporine.  We would need to check a cyclosporine level and see how that looks.  She has not had an imaging study of her brain for a while.  An EEG may be considered, but I doubt this myoclonus is cortical.  We will do a CT scan plain of her head and then consider after that medical options, including EEG, and she says this happened with increased fluid, so I will see her after the CT scan.        Sincerely,            MD LOIS Trent MD             D: 2020   T: 2020   MT: ROSALBA      Name:     ANITA ULRICH   MRN:      [id number]        Account:      [id number]   :      1955           Service Date: 2020      Document: [id number]

## 2020-12-15 RX ORDER — ONDANSETRON 4 MG/1
4 TABLET, ORALLY DISINTEGRATING ORAL EVERY 6 HOURS PRN
Qty: 20 TABLET | Refills: 2 | Status: SHIPPED | OUTPATIENT
Start: 2020-12-15 | End: 2022-02-03

## 2020-12-16 ENCOUNTER — MYC MEDICAL ADVICE (OUTPATIENT)
Dept: INTERNAL MEDICINE | Facility: CLINIC | Age: 65
End: 2020-12-16

## 2020-12-16 DIAGNOSIS — R60.9 EDEMA, UNSPECIFIED TYPE: ICD-10-CM

## 2020-12-16 DIAGNOSIS — I10 ESSENTIAL HYPERTENSION: Primary | ICD-10-CM

## 2020-12-17 ENCOUNTER — OFFICE VISIT (OUTPATIENT)
Dept: PODIATRY | Facility: CLINIC | Age: 65
End: 2020-12-17
Payer: MEDICARE

## 2020-12-17 VITALS — HEART RATE: 72 BPM | OXYGEN SATURATION: 95 % | SYSTOLIC BLOOD PRESSURE: 138 MMHG | DIASTOLIC BLOOD PRESSURE: 84 MMHG

## 2020-12-17 DIAGNOSIS — L03.031 PARONYCHIA, TOE, RIGHT: ICD-10-CM

## 2020-12-17 DIAGNOSIS — E11.49 TYPE II OR UNSPECIFIED TYPE DIABETES MELLITUS WITH NEUROLOGICAL MANIFESTATIONS, NOT STATED AS UNCONTROLLED(250.60) (H): Primary | ICD-10-CM

## 2020-12-17 PROCEDURE — 99213 OFFICE O/P EST LOW 20 MIN: CPT | Performed by: PODIATRIST

## 2020-12-17 RX ORDER — FUROSEMIDE 40 MG
40 TABLET ORAL DAILY
Qty: 90 TABLET | Refills: 3 | Status: SHIPPED | OUTPATIENT
Start: 2020-12-17 | End: 2022-01-11

## 2020-12-17 NOTE — LETTER
2020         RE: Elizabeth Palma  [address]  [address]  [facility] [address]        Dear Colleague,    Thank you for referring your patient, Elizabeth Palma, to the [facility]. Please see a copy of my visit note below.    Past Medical History:   Diagnosis Date     Abnormal MRI, cervical spine 10/15/2011    2011; mild changes noted. Study done for left arm symptoms Impression:  1. Mild multilevel degenerative disc disease with no significant canal or neural stenosis seen. motion artifact on the STIR images in these are not interpretable. The remaining images were interpreted      Autosomal dominant polycystic kidney disease 2011     (Problem list name updated by automated process. Provider to review and confirm.)     CMC DJD(carpometacarpal degenerative joint disease), localized primary 3/5/2013     -donor kidney transplant 3/20/2014     Depressive disorder 11/15/2012     DM type 2 (diabetes mellitus, type 2) (H) 2013     Encounter for long-term (current) use of other medications 2015     Family history of tremor 10/17/2011     Gastroesophageal reflux disease      Generalized anxiety disorder 11/15/2012     Glaucoma      Hyperlipidemia 10/15/2011     Hyperparathyroidism, secondary (H) 2015     Hypertension     resolved     Immunosuppressed status (H) 3/20/2014     Major depressive disorder, recurrent episode, moderate (H) 11/15/2012     Obesity (BMI 30-39.9)      OP (osteoporosis) T score -3.8 2009 T-score -3.7      LION (obstructive sleep apnoea) 10/15/2012    intol to cpa     Pain in joint, forearm -- L unhealed Fx 2013     Premature menopause age 37 7/10/2012    OCP (vaginal bldg)-->HT which she stopped 2 mo later documented at 2007 visit (age 51).      Restless leg syndrome      Rib fractures 2013     Sensory loss 10/17/2011    Bottom of feet; uncertain if there is a neuropathy per notes.       Stiffness  of joint, not elsewhere classified, hand 3/5/2013     Tremor 10/15/2011    head     Uncomplicated asthma      Patient Active Problem List   Diagnosis     Hypertension     Hyperlipidemia     Abnormal MRI, cervical spine     Sensory loss     Premature menopause age 37     OP (osteoporosis) T score -3.8     Major depressive disorder, recurrent episode, moderate (H)     Generalized anxiety disorder     CMC DJD(carpometacarpal degenerative joint disease), localized primary     Pain in joint, forearm -- L unhealed Fx     -donor kidney transplant     Immunosuppressed status (H)     Hyperparathyroidism, secondary (H)     Hyperparathyroidism (H)     Senile osteoporosis     Pain in joint involving ankle and foot     Nightmares associated with chronic post-traumatic stress disorder     Type 2 diabetes mellitus with peripheral neuropathy (H)     Posttraumatic stress disorder     Plantar fasciitis, bilateral     Right knee pain     Left knee pain     Age-related osteoporosis without current pathological fracture     Narcissistic personality disorder (H)     Personal history of other drug therapy     Median sensory neuropathy, left     Marital conflict     Suicidal ideation     Autosomal dominant polycystic kidney disease     Secondary hyperparathyroidism (H)     Anemia, iron deficiency     Past Surgical History:   Procedure Laterality Date     ABDOMEN SURGERY       ANKLE SURGERY       C TRANSPLANTATION OF KIDNEY  2014     C/SECTION, LOW TRANSVERSE      x 2     CHOLECYSTECTOMY       COLONOSCOPY       ESOPHAGOSCOPY, GASTROSCOPY, DUODENOSCOPY (EGD), COMBINED N/A 2015    Procedure: COMBINED ESOPHAGOSCOPY, GASTROSCOPY, DUODENOSCOPY (EGD);  Surgeon: Sky Davey MD;  Location:  GI     ESOPHAGOSCOPY, GASTROSCOPY, DUODENOSCOPY (EGD), COMBINED N/A 2015    Procedure: COMBINED ESOPHAGOSCOPY, GASTROSCOPY, DUODENOSCOPY (EGD), BIOPSY SINGLE OR MULTIPLE;  Surgeon: Sky Davey MD;  Location:  GI      EYE SURGERY       LAPAROSCOPY, SURGICAL; REPAIR INCISIONAL OR VENTRAL HERNIA       LASER SURGERY OF EYE Left 10/01/2020    sever vitreous strands     ORTHOPEDIC SURGERY       HERMINIA EN Y BOWEL  1990     WRIST SURGERY       Social History     Socioeconomic History     Marital status:      Spouse name: Rahat     Number of children: 2     Years of education: Not on file     Highest education level: Not on file   Occupational History     Comment: part-time   Social Needs     Financial resource strain: Not on file     Food insecurity     Worry: Not on file     Inability: Not on file     Transportation needs     Medical: Not on file     Non-medical: Not on file   Tobacco Use     Smoking status: Former Smoker     Smokeless tobacco: Never Used   Substance and Sexual Activity     Alcohol use: No     Alcohol/week: 0.0 standard drinks     Drug use: No     Sexual activity: Yes     Partners: Male     Birth control/protection: None     Comment: 1 partner   Lifestyle     Physical activity     Days per week: Not on file     Minutes per session: Not on file     Stress: Not on file   Relationships     Social connections     Talks on phone: Not on file     Gets together: Not on file     Attends Religion service: Not on file     Active member of club or organization: Not on file     Attends meetings of clubs or organizations: Not on file     Relationship status: Not on file     Intimate partner violence     Fear of current or ex partner: Not on file     Emotionally abused: Not on file     Physically abused: Not on file     Forced sexual activity: Not on file   Other Topics Concern     Parent/sibling w/ CABG, MI or angioplasty before 65F 55M? Not Asked   Social History Narrative     Not on file     Family History   Problem Relation Age of Onset     Mental Illness Other         family hx     Heart Disease Other      Diabetes Other      Cancer Other      Genetic Disorder Father      Mental Illness Father      Diabetes Father       Hypertension Father      Hyperlipidemia Mother      Diabetes Mother      Hypertension Mother      Mental Illness Other      Diabetes Other      Glaucoma No family hx of      Macular Degeneration No family hx of      Lab Results   Component Value Date    A1C 6.7 09/03/2020    A1C 7.1 12/17/2019    A1C 7.2 04/24/2019    A1C 7.4 11/27/2018    A1C 6.4 01/19/2018     SUBJECTIVE FINDINGS:  65-year-old female returns to clinic for nail border pain in the right medial hallux nail border.  She relates it is really painful.  She went to urgent care.  They just told her to soak it 24 hours a day.  She relates that it hurts on the inside medial nail border.  Relates no injuries.      OBJECTIVE FINDINGS:  DP and PT are 2/4 right.  She has thickened, dystrophic right hallux nail with pressure erythema on the medial and lateral nail borders.  She has pain in the medial nail border.  There is no gross erythema, minimal edema, no odor, no calor, no drainage.      ASSESSMENT AND PLAN:  Paronychia causing pain, right medial hallux nail border.  There are no gross signs of infection.  This is pressure and pain.  She is diabetic with peripheral neuropathy.  Diagnosis and treatment discussed with her.  The right hallux was debrided upon consent, it did bleed upon debridement, and curetted upon consent.  Local wound care done.  I am going to have her twice daily, either soak this in warm water and Epsom salt or clean it with MicroKlenz, apply bacitracin and a Band-Aid.  Return to clinic as scheduled in January.           Again, thank you for allowing me to participate in the care of your patient.        Sincerely,        Javier Tuttle DPM

## 2020-12-17 NOTE — PROGRESS NOTES
Past Medical History:   Diagnosis Date     Abnormal MRI, cervical spine 10/15/2011    2011; mild changes noted. Study done for left arm symptoms Impression:  1. Mild multilevel degenerative disc disease with no significant canal or neural stenosis seen. motion artifact on the STIR images in these are not interpretable. The remaining images were interpreted      Autosomal dominant polycystic kidney disease 2011     (Problem list name updated by automated process. Provider to review and confirm.)     CMC DJD(carpometacarpal degenerative joint disease), localized primary 3/5/2013     -donor kidney transplant 3/20/2014     Depressive disorder 11/15/2012     DM type 2 (diabetes mellitus, type 2) (H) 2013     Encounter for long-term (current) use of other medications 2015     Family history of tremor 10/17/2011     Gastroesophageal reflux disease      Generalized anxiety disorder 11/15/2012     Glaucoma      Hyperlipidemia 10/15/2011     Hyperparathyroidism, secondary (H) 2015     Hypertension     resolved     Immunosuppressed status (H) 3/20/2014     Major depressive disorder, recurrent episode, moderate (H) 11/15/2012     Obesity (BMI 30-39.9)      OP (osteoporosis) T score -3.8 2009 T-score -3.7      LION (obstructive sleep apnoea) 10/15/2012    intol to cpa     Pain in joint, forearm -- L unhealed Fx 2013     Premature menopause age 37 7/10/2012    OCP (vaginal bldg)-->HT which she stopped 2 mo later documented at 2007 visit (age 51).      Restless leg syndrome      Rib fractures 2013     Sensory loss 10/17/2011    Bottom of feet; uncertain if there is a neuropathy per notes.       Stiffness of joint, not elsewhere classified, hand 3/5/2013     Tremor 10/15/2011    head     Uncomplicated asthma      Patient Active Problem List   Diagnosis     Hypertension     Hyperlipidemia     Abnormal MRI, cervical spine     Sensory loss     Premature menopause age 37      OP (osteoporosis) T score -3.8     Major depressive disorder, recurrent episode, moderate (H)     Generalized anxiety disorder     CMC DJD(carpometacarpal degenerative joint disease), localized primary     Pain in joint, forearm -- L unhealed Fx     -donor kidney transplant     Immunosuppressed status (H)     Hyperparathyroidism, secondary (H)     Hyperparathyroidism (H)     Senile osteoporosis     Pain in joint involving ankle and foot     Nightmares associated with chronic post-traumatic stress disorder     Type 2 diabetes mellitus with peripheral neuropathy (H)     Posttraumatic stress disorder     Plantar fasciitis, bilateral     Right knee pain     Left knee pain     Age-related osteoporosis without current pathological fracture     Narcissistic personality disorder (H)     Personal history of other drug therapy     Median sensory neuropathy, left     Marital conflict     Suicidal ideation     Autosomal dominant polycystic kidney disease     Secondary hyperparathyroidism (H)     Anemia, iron deficiency     Past Surgical History:   Procedure Laterality Date     ABDOMEN SURGERY       ANKLE SURGERY       C TRANSPLANTATION OF KIDNEY  2014     C/SECTION, LOW TRANSVERSE      x 2     CHOLECYSTECTOMY       COLONOSCOPY       ESOPHAGOSCOPY, GASTROSCOPY, DUODENOSCOPY (EGD), COMBINED N/A 2015    Procedure: COMBINED ESOPHAGOSCOPY, GASTROSCOPY, DUODENOSCOPY (EGD);  Surgeon: Sky Davey MD;  Location:  GI     ESOPHAGOSCOPY, GASTROSCOPY, DUODENOSCOPY (EGD), COMBINED N/A 2015    Procedure: COMBINED ESOPHAGOSCOPY, GASTROSCOPY, DUODENOSCOPY (EGD), BIOPSY SINGLE OR MULTIPLE;  Surgeon: Sky Davey MD;  Location:  GI     EYE SURGERY       LAPAROSCOPY, SURGICAL; REPAIR INCISIONAL OR VENTRAL HERNIA       LASER SURGERY OF EYE Left 10/01/2020    sever vitreous strands     ORTHOPEDIC SURGERY       HERMINIA EN Y BOWEL       WRIST SURGERY       Social History     Socioeconomic History      Marital status:      Spouse name: Rahat     Number of children: 2     Years of education: Not on file     Highest education level: Not on file   Occupational History     Comment: part-time   Social Needs     Financial resource strain: Not on file     Food insecurity     Worry: Not on file     Inability: Not on file     Transportation needs     Medical: Not on file     Non-medical: Not on file   Tobacco Use     Smoking status: Former Smoker     Smokeless tobacco: Never Used   Substance and Sexual Activity     Alcohol use: No     Alcohol/week: 0.0 standard drinks     Drug use: No     Sexual activity: Yes     Partners: Male     Birth control/protection: None     Comment: 1 partner   Lifestyle     Physical activity     Days per week: Not on file     Minutes per session: Not on file     Stress: Not on file   Relationships     Social connections     Talks on phone: Not on file     Gets together: Not on file     Attends Denominational service: Not on file     Active member of club or organization: Not on file     Attends meetings of clubs or organizations: Not on file     Relationship status: Not on file     Intimate partner violence     Fear of current or ex partner: Not on file     Emotionally abused: Not on file     Physically abused: Not on file     Forced sexual activity: Not on file   Other Topics Concern     Parent/sibling w/ CABG, MI or angioplasty before 65F 55M? Not Asked   Social History Narrative     Not on file     Family History   Problem Relation Age of Onset     Mental Illness Other         family hx     Heart Disease Other      Diabetes Other      Cancer Other      Genetic Disorder Father      Mental Illness Father      Diabetes Father      Hypertension Father      Hyperlipidemia Mother      Diabetes Mother      Hypertension Mother      Mental Illness Other      Diabetes Other      Glaucoma No family hx of      Macular Degeneration No family hx of      Lab Results   Component Value Date    A1C 6.7  09/03/2020    A1C 7.1 12/17/2019    A1C 7.2 04/24/2019    A1C 7.4 11/27/2018    A1C 6.4 01/19/2018     SUBJECTIVE FINDINGS:  65-year-old female returns to clinic for nail border pain in the right medial hallux nail border.  She relates it is really painful.  She went to urgent care.  They just told her to soak it 24 hours a day.  She relates that it hurts on the inside medial nail border.  Relates no injuries.      OBJECTIVE FINDINGS:  DP and PT are 2/4 right.  She has thickened, dystrophic right hallux nail with pressure erythema on the medial and lateral nail borders.  She has pain in the medial nail border.  There is no gross erythema, minimal edema, no odor, no calor, no drainage.      ASSESSMENT AND PLAN:  Paronychia causing pain, right medial hallux nail border.  There are no gross signs of infection.  This is pressure and pain.  She is diabetic with peripheral neuropathy.  Diagnosis and treatment discussed with her.  The right hallux was debrided upon consent, it did bleed upon debridement, and curetted upon consent.  Local wound care done.  I am going to have her twice daily, either soak this in warm water and Epsom salt or clean it with MicroKlenz, apply bacitracin and a Band-Aid.  Return to clinic as scheduled in January.

## 2020-12-17 NOTE — NURSING NOTE
Elizabeth Palma's chief complaint for this visit includes:  Chief Complaint   Patient presents with     RECHECK     ingrown toenail right hallux     PCP: Horacio Sheridan    Referring Provider:  No referring provider defined for this encounter.    /84 (BP Location: Right arm, Patient Position: Sitting, Cuff Size: Adult Regular)   Pulse 72   LMP  (LMP Unknown)   SpO2 95%   Data Unavailable     Do you need any medication refills at today's visit? Sienna Wiseman CMA

## 2020-12-17 NOTE — PATIENT INSTRUCTIONS
Thanks for coming today.  Ortho/Sports Medicine Clinic  [address] [place] [address]    To schedule future appointments in Ortho Clinic, you may call [phone].    To schedule ordered imaging by your provider:   Call Central Imaging [phone]    To schedule an injection ordered by your provider:  Call Central Imaging Injection scheduling line: [phone]  [facility] available online at:  [email or web address]    Please call if any further questions or concerns ([phone]).  Clinic hours 8 am to 5 pm.    Return to clinic (call) if symptoms worsen or fail to improve.

## 2020-12-18 ENCOUNTER — THERAPY VISIT (OUTPATIENT)
Dept: PHYSICAL THERAPY | Facility: CLINIC | Age: 65
End: 2020-12-18
Payer: MEDICARE

## 2020-12-18 DIAGNOSIS — M72.2 PLANTAR FASCIITIS, BILATERAL: ICD-10-CM

## 2020-12-18 PROCEDURE — 97140 MANUAL THERAPY 1/> REGIONS: CPT | Mod: GP | Performed by: PHYSICAL THERAPIST

## 2020-12-18 PROCEDURE — 97110 THERAPEUTIC EXERCISES: CPT | Mod: GP | Performed by: PHYSICAL THERAPIST

## 2020-12-23 ENCOUNTER — THERAPY VISIT (OUTPATIENT)
Dept: PHYSICAL THERAPY | Facility: CLINIC | Age: 65
End: 2020-12-23
Payer: MEDICARE

## 2020-12-23 ENCOUNTER — TELEPHONE (OUTPATIENT)
Dept: NEUROLOGY | Facility: CLINIC | Age: 65
End: 2020-12-23

## 2020-12-23 ENCOUNTER — MYC MEDICAL ADVICE (OUTPATIENT)
Dept: INTERNAL MEDICINE | Facility: CLINIC | Age: 65
End: 2020-12-23

## 2020-12-23 DIAGNOSIS — M72.2 PLANTAR FASCIITIS, BILATERAL: ICD-10-CM

## 2020-12-23 DIAGNOSIS — R25.1 TREMOR: Primary | ICD-10-CM

## 2020-12-23 PROCEDURE — 97110 THERAPEUTIC EXERCISES: CPT | Mod: GP | Performed by: PHYSICAL THERAPIST

## 2020-12-23 PROCEDURE — 97140 MANUAL THERAPY 1/> REGIONS: CPT | Mod: GP | Performed by: PHYSICAL THERAPIST

## 2020-12-23 NOTE — TELEPHONE ENCOUNTER
I called Elizabeth back. I let her know Dr. Jacome would like her to have a CT head then follow up with him. I will have a  call her to help set up both visits.     Ele LEUNG

## 2020-12-23 NOTE — TELEPHONE ENCOUNTER
[facility] Call Center    Phone Message    May a detailed message be left on voicemail: yes     Reason for Call: Other: Patients spouse Shawn calling in regards to patients appointment with Dr. Jacome a couple weeks ago. States that they discussed getting some imaging done of patients brain. States that they have not heard anything about scheduling and writer does not seen an order in the chart.    Please advise and call Elizabeth back at your earliest convenience once orders have been placed.     Elizabeth: [phone]    Action Taken: Other: [facility] NEUROLOG[facility]    Travel Screening: Not Applicable

## 2020-12-30 ENCOUNTER — TRANSFERRED RECORDS (OUTPATIENT)
Dept: HEALTH INFORMATION MANAGEMENT | Facility: CLINIC | Age: 65
End: 2020-12-30

## 2020-12-30 ENCOUNTER — MEDICAL CORRESPONDENCE (OUTPATIENT)
Dept: PHYSICAL THERAPY | Facility: CLINIC | Age: 65
End: 2020-12-30

## 2021-01-05 ENCOUNTER — HOSPITAL ENCOUNTER (OUTPATIENT)
Dept: CT IMAGING | Facility: CLINIC | Age: 66
Discharge: HOME | End: 2021-01-05
Attending: PSYCHIATRY & NEUROLOGY | Admitting: PSYCHIATRY & NEUROLOGY
Payer: MEDICARE

## 2021-01-05 ENCOUNTER — TELEPHONE (OUTPATIENT)
Dept: TRANSPLANT | Facility: CLINIC | Age: 66
End: 2021-01-05

## 2021-01-05 DIAGNOSIS — R25.1 TREMOR: ICD-10-CM

## 2021-01-05 PROCEDURE — 70450 CT HEAD/BRAIN W/O DYE: CPT | Mod: ME

## 2021-01-07 NOTE — PATIENT INSTRUCTIONS

## 2021-01-07 NOTE — PROGRESS NOTES
Elizabeth Palma is a 65 year old female who is being evaluated via a billable video visit.      How would you like to obtain your AVS? MyChart  If the video visit is dropped, the invitation should be resent by: Send to e-mail at: [email or web address]  Will anyone else be joining your video visit? No    Video Start Time: 11:04 AM      Type of service:  Video Visit    Video End Time:11:25 AM    Originating Location (pt. Location): Home    Distant Location (provider location):  [facility]     Platform used for Video Visit: [facility]    SLEEP MEDICINE VIRTUAL VIDEO FOLLOW-UP VISIT  Sleep Psychology    Patient Name: Elizabeth Palma  MRN:  [id number]  Date of Service: Jan 8, 2021       Subjective Report     Elizabeth Palma  returns for a telehealth video visit to discuss progress in implementing behavioral strategies for the management of insomnia.  Patient consent for initiation of video visit was obtained and documented prior to initiation of visit.     Elizabeth reports improvement in overall sleep quality.  Reduced sleep latency and some reduction in total wake time.  Insomnia symptoms continue moderate though reduced.  General adherence to sleep window.  Discussed possible benefit of brief naps to improve alertness given that sleep efficiency at night now normal.  She also does not want to consider increasing nocturnal TIB to see if this reduced later afternoon sleepiness..     .     Sleep Data:     Source of Sleep Estimates:  verbal self-report    Average Sleep Latency: 15 min.  Times Awakened: 2  Average Wake Time After Sleep Onset: less than 30 min.  Average Total Sleep Time:  7.3 hrs  Sleep Efficiency: greater than 85%    Total score - [place]: 12 .    JUAN RAMON Total Score: 18       Interventions     Strategies and recommendations including maintenance of stimulus control and scheduled naps for management of afternoon fatigue were discussed today.       Vital Signs     LMP  (LMP Unknown)       Mental Status     Appearance:  Well kept  Orientation:  X3  Mood:  normal  Affect:  Congruent with mood  Speech/Language:  Normal  Thought Process: Intact  Associations:  Normal  Thought Content: Normal  Patient does not report any suicidal ideation, intention or plan.    Diagnostic Impressions and Plan     Chronic insomnia    Plan:  continue current sleep schedule and plan and introduce brief afternoon nap as SE if normal at night    Follow-up: 4 weeks      Juan Quintanilla, Simba, LP, DBSM  Diplomate, Behavioral Sleep Medicine  [facility]      Note: This dictation was created using voice recognition software. This document may contain an error not identified before finalizing the document. If the error changes the accuracy of the document, I would appreciate it being brought to my attention.                           \

## 2021-01-08 ENCOUNTER — VIRTUAL VISIT (OUTPATIENT)
Dept: SLEEP MEDICINE | Facility: CLINIC | Age: 66
End: 2021-01-08
Payer: MEDICARE

## 2021-01-08 DIAGNOSIS — F51.04 CHRONIC INSOMNIA: Primary | ICD-10-CM

## 2021-01-08 PROCEDURE — 90832 PSYTX W PT 30 MINUTES: CPT | Mod: 95 | Performed by: PSYCHOLOGIST

## 2021-01-12 ENCOUNTER — DOCUMENTATION ONLY (OUTPATIENT)
Dept: CARE COORDINATION | Facility: CLINIC | Age: 66
End: 2021-01-12

## 2021-01-12 ENCOUNTER — MYC MEDICAL ADVICE (OUTPATIENT)
Dept: TRANSPLANT | Facility: CLINIC | Age: 66
End: 2021-01-12

## 2021-01-13 ENCOUNTER — HOSPITAL ENCOUNTER (OUTPATIENT)
Dept: MAMMOGRAPHY | Facility: CLINIC | Age: 66
Discharge: HOME | End: 2021-01-13
Attending: INTERNAL MEDICINE | Admitting: INTERNAL MEDICINE
Payer: MEDICARE

## 2021-01-13 DIAGNOSIS — Z12.31 VISIT FOR SCREENING MAMMOGRAM: ICD-10-CM

## 2021-01-13 PROCEDURE — 77063 BREAST TOMOSYNTHESIS BI: CPT

## 2021-01-14 ENCOUNTER — VIRTUAL VISIT (OUTPATIENT)
Dept: NEUROLOGY | Facility: CLINIC | Age: 66
End: 2021-01-14
Payer: MEDICARE

## 2021-01-14 ENCOUNTER — HEALTH MAINTENANCE LETTER (OUTPATIENT)
Age: 66
End: 2021-01-14

## 2021-01-14 DIAGNOSIS — R25.1 TREMOR: Primary | ICD-10-CM

## 2021-01-14 PROCEDURE — 99213 OFFICE O/P EST LOW 20 MIN: CPT | Mod: 95 | Performed by: PSYCHIATRY & NEUROLOGY

## 2021-01-14 NOTE — PROGRESS NOTES
Elizabeth is a 65 year old who is being evaluated via a billable video visit.      How would you like to obtain your AVS? mychart  If the video visit is dropped, the invitation should be resent by: please send link to  [email or web address]  Will anyone else be joining your video visit? no    Video-Visit Details    Type of service:  Video Visit X 25 MIN    FIOL

## 2021-01-14 NOTE — PROGRESS NOTES
Service Date: 2021      Horacio Sheridan MD   [facility] Internal Medicine    [address] [place] [address]       RE: Anita Palma    MRN: [id number]   : 1955      Dear Horacio:       I saw Ms. Palma once more on a virtual video visit lasting 25 minutes regarding her tremors.  She reported the tremors are continuing.  They are particularly action tremors.  Further history indicated the mother has them, and she says they are very bad and apparently had them all her life.  She does not know what medication the mom has.  She had some myoclonus reported before, and now she says those are gone.  Her last cyclosporine level was low, and I have ordered a recheck in case this is the reason of the tremor, although I suspect it may be more essential tremor.  She reports some memory issues, but upon review with the , Rahat, he says that he helps her with medication, and occasionally they forget, but it is generally okay.  We reviewed her CT scan of the brain, and this actually looks pretty good.  There are no focal strokes, there is absolutely no atrophy, and she was reassured there is no Alzheimer.      PHYSICAL EXAMINATION:  I reexamined her today.  She is a most pleasant woman.  She has slight slurred speech.  She walks hesitantly, but well.  She moves her arms when she walks slightly.  Romberg shows some swaying.  There is action tremor of both hands, especially the right more than the left.  Her speech is a little bit halting like with tremor.      In summary, I believe it is probably a familial essential tremor versus a metabolic tremor due to her metabolic condition.  We will check a cyclosporine level and see her in the near future and possibly try her on primidone at very low doses for the essential tremor.       Sincerely,      MD LOIS Trent MD             D: 2021   T: 2021   MT: rudy      Name:     ANITA PALMA   MRN:      [id number]         Account:      [id number]   :      1955           Service Date: 2021      Document: [id number]

## 2021-01-14 NOTE — LETTER
2021       RE: Elizabeth Palma  [address]  [address]  [facility] [address]     Dear Colleague,    Thank you for referring your patient, Elizabeth Palma, to the [facility] NEUROLOGY CL[facility]IC [place] at [facility]. Please see a copy of my visit note below.    Elizabeth is a 65 year old who is being evaluated via a billable video visit.      How would you like to obtain your AVS? mychart  If the video visit is dropped, the invitation should be resent by: please send link to  [email or web address]  Will anyone else be joining your video visit? no    Video-Visit Details    Type of service:  Video Visit X 25 MIN    FIOL    Service Date: 2021      Horacio Sheridan MD   [facility] Internal Medicine    [address] [place] [address]       RE: Elizabeth Palma    MRN: [id number]   : 1955      Dear Horacio:       I saw Ms. Palma once more on a virtual video visit lasting 25 minutes regarding her tremors.  She reported the tremors are continuing.  They are particularly action tremors.  Further history indicated the mother has them, and she says they are very bad and apparently had them all her life.  She does not know what medication the mom has.  She had some myoclonus reported before, and now she says those are gone.  Her last cyclosporine level was low, and I have ordered a recheck in case this is the reason of the tremor, although I suspect it may be more essential tremor.  She reports some memory issues, but upon review with the , Rahat, he says that he helps her with medication, and occasionally they forget, but it is generally okay.  We reviewed her CT scan of the brain, and this actually looks pretty good.  There are no focal strokes, there is absolutely no atrophy, and she was reassured there is no Alzheimer.      PHYSICAL EXAMINATION:  I reexamined her today.  She is a most pleasant woman.  She has slight slurred speech.  She walks  hesitantly, but well.  She moves her arms when she walks slightly.  Romberg shows some swaying.  There is action tremor of both hands, especially the right more than the left.  Her speech is a little bit halting like with tremor.      In summary, I believe it is probably a familial essential tremor versus a metabolic tremor due to her metabolic condition.  We will check a cyclosporine level and see her in the near future and possibly try her on primidone at very low doses for the essential tremor.       Sincerely,      Frantz Jacome MD                  D: 2021   T: 2021   MT: rudy      Name:     ANITA ULRICH   MRN:      [id number]        Account:      [id number]   :      1955           Service Date: 2021      Document: [id number]

## 2021-01-15 ENCOUNTER — THERAPY VISIT (OUTPATIENT)
Dept: PHYSICAL THERAPY | Facility: CLINIC | Age: 66
End: 2021-01-15
Payer: MEDICARE

## 2021-01-15 DIAGNOSIS — M25.562 BILATERAL KNEE PAIN: Primary | ICD-10-CM

## 2021-01-15 DIAGNOSIS — M25.561 BILATERAL KNEE PAIN: Primary | ICD-10-CM

## 2021-01-15 PROCEDURE — 97161 PT EVAL LOW COMPLEX 20 MIN: CPT | Mod: GP | Performed by: PHYSICAL THERAPIST

## 2021-01-15 PROCEDURE — 97110 THERAPEUTIC EXERCISES: CPT | Mod: GP | Performed by: PHYSICAL THERAPIST

## 2021-01-15 ASSESSMENT — ACTIVITIES OF DAILY LIVING (ADL)
KNEE_ACTIVITY_OF_DAILY_LIVING_SUM: 23
WALK: ACTIVITY IS FAIRLY DIFFICULT
SIT WITH YOUR KNEE BENT: ACTIVITY IS NOT DIFFICULT
KNEE_ACTIVITY_OF_DAILY_LIVING_SCORE: 32.86
STAND: ACTIVITY IS FAIRLY DIFFICULT
KNEEL ON THE FRONT OF YOUR KNEE: I AM UNABLE TO DO THE ACTIVITY
RISE FROM A CHAIR: ACTIVITY IS MINIMALLY DIFFICULT
WEAKNESS: THE SYMPTOM AFFECTS MY ACTIVITY SEVERELY
GO DOWN STAIRS: ACTIVITY IS VERY DIFFICULT
AS_A_RESULT_OF_YOUR_KNEE_INJURY,_HOW_WOULD_YOU_RATE_YOUR_CURRENT_LEVEL_OF_DAILY_ACTIVITY?: ABNORMAL
STIFFNESS: THE SYMPTOM AFFECTS MY ACTIVITY SEVERELY
PAIN: THE SYMPTOM AFFECTS MY ACTIVITY SEVERELY
RAW_SCORE: 23
GIVING WAY, BUCKLING OR SHIFTING OF KNEE: THE SYMPTOM AFFECTS MY ACTIVITY SEVERELY
LIMPING: THE SYMPTOM AFFECTS MY ACTIVITY SEVERELY
GO UP STAIRS: ACTIVITY IS VERY DIFFICULT
HOW_WOULD_YOU_RATE_THE_OVERALL_FUNCTION_OF_YOUR_KNEE_DURING_YOUR_USUAL_DAILY_ACTIVITIES?: ABNORMAL
SWELLING: THE SYMPTOM AFFECTS MY ACTIVITY MODERATELY
SQUAT: ACTIVITY IS VERY DIFFICULT
HOW_WOULD_YOU_RATE_THE_CURRENT_FUNCTION_OF_YOUR_KNEE_DURING_YOUR_USUAL_DAILY_ACTIVITIES_ON_A_SCALE_FROM_0_TO_100_WITH_100_BEING_YOUR_LEVEL_OF_KNEE_FUNCTION_PRIOR_TO_YOUR_INJURY_AND_0_BEING_THE_INABILITY_TO_PERFORM_ANY_OF_YOUR_USUAL_DAILY_ACTIVITIES?: 70

## 2021-01-15 NOTE — LETTER
DEPARTMENT OF HEALTH AND HUMAN SERVICES  CENTERS FOR MEDICARE & MEDICAID SERVICES    PLAN/UPDATED PLAN OF PROGRESS FOR OUTPATIENT REHABILITATION    PATIENTS NAME:  Elizabeth Palma     : 1955    PROVIDER NUMBER:    [id number]    HICN: [id number]     PROVIDER NAME: [facility]    MEDICAL RECORD NUMBER: [id number]     START OF CARE DATE:  SOC Date: 01/15/21   TYPE:  PT    PRIMARY/TREATMENT DIAGNOSIS: (Pertinent Medical Diagnosis)  Bilateral knee pain    VISITS FROM START OF CARE:        [facility] Initial Evaluation  Subjective:  Patient presents to PT with referral dated 2020 for treatment of bilateral patella maltracking.  Patient a several year history of bilateral knee pain without precipitating event.  She complains of right greater than left inferior patellar and lateral knee pain which is worse with standing and walking and better with rest.  She is unable to do stairs, and leans heavily on her  to ascend or descend a curb.  She has a walker at home.  The history is provided by the patient.   Patient Health History  Elizabeth Palma being seen for knee pain.   Date of Onset: MD referral 2020.   Problem occurred: unknown   Pain is reported as 8/10 on pain scale.  General health as reported by patient is fair.  Pertinent medical history includes: asthma, depression, diabetes, high blood pressure, history of fractures, kidney disease, osteoporosis, osteoarthritis, overweight and sleep disorder/apnea.   Red flags:  Pain at rest/night.  Surgeries include:  Orthopedic surgery. Other surgery history details: broken bones.    Current medications:  Anti-depressants, bone density, high blood pressure medication, muscle relaxants and sleep medication.    Current occupation is teacher.   Primary job tasks include:  Computer work.       Objective:  Standing Alignment:    Knee:  Genu valgus R, patella alicia L, patella alicia R, genu  valgus L, genu recuvatum L and genu recurvatum R  Ankle/Foot:  Pes planus L  General deviations alignment: shortened stride bilat.  PATIENTS NAME:  Elizabeth Palma   : 1955    Gait:    Assistive Devices:  None  Deviations:  Ankle:  Push off decr R and push off decr L   Knee Evaluation:  ROM:  Strength wnl knee: good quad set bilat.  Unable to do SLR bilat.  Glut med strength 4-/5 bilat. Unable to do single leg toe raise on either leg.  AROM  Hyperextension:  Left:  10    Right: 10  Extension:  Left: 0    Right:  0  Flexion: Left: 122    Right: 123  PROM  Flexion: Left: 130    Right:  130  Ligament Testing:  Normal  Special Tests:   Left knee positive for the following special tests:  Patellar Compression  Right knee positive for the following tests:  Patellar Compression  Palpation:    Left knee tenderness present at:  Medial Joint Line; Lateral Joint Line and Patellar Tendon  Right knee tenderness present at:  Lateral Joint Line and Patellar Tendon  Edema:  Normal  Functional Testing:  : sit to stand without arm rests is difficult.  unable to maintain SLS due to pain.    Assessment/Plan:    Patient is a 65 year old female with both sides knee complaints.    Patient has the following significant findings with corresponding treatment plan.                Diagnosis 1:  Right greater than left knee pain  Pain -  self management, education and home program  Decreased strength - therapeutic exercise and therapeutic activities  Impaired gait - gait training  Decreased function - therapeutic activities    Therapy Evaluation Codes:   1) History comprised of:   Personal factors that impact the plan of care:      None.    Comorbidity factors that impact the plan of care are:      None.     Medications impacting care: None.  2) Examination of Body Systems comprised of:   Body structures and functions that impact the plan of care:      Knee.   Activity limitations that impact the plan of care are:      Standing and  "Walking.  3) Clinical presentation characteristics are:   Stable/Uncomplicated.  4) Decision-Making    Low complexity using standardized patient assessment instrument and/or measureable assessment of functional outcome.  Cumulative Therapy Evaluation is: Low complexity.  PATIENTS NAME:  Elizabeth Palma   : 1955    Previous and current functional limitations:  (See Goal Flow Sheet for this information)    Short term and Long term goals: (See Goal Flow Sheet for this information)     Communication ability:  Patient appears to be able to clearly communicate and understand verbal and written communication and follow directions correctly.  Treatment Explanation - The following has been discussed with the patient:   RX ordered/plan of care  Anticipated outcomes  Possible risks and side effects  This patient would benefit from PT intervention to resume normal activities.   Rehab potential is good.    Frequency:  1 X week, once daily  Duration:  for 8 weeks  Discharge Plan:  Achieve all LTG.  Independent in home treatment program.  Reach maximal therapeutic benefit.        Caregiver Signature/Credentials _____________________________ Date ________       Treating Provider: Rosa Azevedo PT [facility]   I have reviewed and certified the need for these services and plan of treatment while under my care.        PHYSICIAN'S SIGNATURE:   _________________________________________  Date___________   Javier Tuttle DPM    Certification period:  Beginning of Cert date period: 01/15/21 to  End of Cert period date: 21     Functional Level Progress Report: Please see attached \"Goal Flow sheet for Functional level.\"    ____X____ Continue Services or       ________ DC Services                Service dates: From  SOC Date: 01/15/21 date to present                         "

## 2021-01-15 NOTE — PROGRESS NOTES
[facility] Initial Evaluation  Subjective:  Patient presents to PT with referral dated 12/30/2020 for treatment of bilateral patella maltracking.  Patient a several year history of bilateral knee pain without precipitating event.  She complains of right greater than left inferior patellar and lateral knee pain which is worse with standing and walking and better with rest.  She is unable to do stairs, and leans heavily on her  to ascend or descend a curb.  She has a walker at home.    The history is provided by the patient.   Patient Health History  Elizabeth Palma being seen for knee pain.     Date of Onset: MD referral 12/30/2020.   Problem occurred: unknown   Pain is reported as 8/10 on pain scale.  General health as reported by patient is fair.  Pertinent medical history includes: asthma, depression, diabetes, high blood pressure, history of fractures, kidney disease, osteoporosis, osteoarthritis, overweight and sleep disorder/apnea.   Red flags:  Pain at rest/night.     Surgeries include:  Orthopedic surgery. Other surgery history details: broken bones.    Current medications:  Anti-depressants, bone density, high blood pressure medication, muscle relaxants and sleep medication.    Current occupation is teacher.   Primary job tasks include:  Computer work.                                    Objective:  Standing Alignment:              Knee:  Genu valgus R, patella alicia L, patella alicia R, genu valgus L, genu recuvatum L and genu recurvatum R  Ankle/Foot:  Pes planus L  General deviations alignment: shortened stride bilat.  Gait:    Assistive Devices:  None  Deviations:  Ankle:  Push off decr R and push off decr L                                                      Knee Evaluation:  ROM:  Strength wnl knee: good quad set bilat.  Unable to do SLR bilat.  Glut med strength 4-/5 bilat. Unable to do single leg toe raise on either leg.  AROM    Hyperextension:  Left:  10    Right:  10  Extension:  Left: 0    Right:  0  Flexion: Left: 122    Right: 123  PROM        Flexion: Left: 130    Right:  130        Ligament Testing:  Normal                Special Tests:   Left knee positive for the following special tests:  Patellar Compression  Right knee positive for the following tests:  Patellar Compression  Palpation:    Left knee tenderness present at:  Medial Joint Line; Lateral Joint Line and Patellar Tendon  Right knee tenderness present at:  Lateral Joint Line and Patellar Tendon  Edema:  Normal      Functional Testing:  : sit to stand without arm rests is difficult.  unable to maintain SLS due to pain.                  General     ROS    Assessment/Plan:    Patient is a 65 year old female with both sides knee complaints.    Patient has the following significant findings with corresponding treatment plan.                Diagnosis 1:  Right greater than left knee pain  Pain -  self management, education and home program  Decreased strength - therapeutic exercise and therapeutic activities  Impaired gait - gait training  Decreased function - therapeutic activities    Therapy Evaluation Codes:   1) History comprised of:   Personal factors that impact the plan of care:      None.    Comorbidity factors that impact the plan of care are:      None.     Medications impacting care: None.  2) Examination of Body Systems comprised of:   Body structures and functions that impact the plan of care:      Knee.   Activity limitations that impact the plan of care are:      Standing and Walking.  3) Clinical presentation characteristics are:   Stable/Uncomplicated.  4) Decision-Making    Low complexity using standardized patient assessment instrument and/or measureable assessment of functional outcome.  Cumulative Therapy Evaluation is: Low complexity.    Previous and current functional limitations:  (See Goal Flow Sheet for this information)    Short term and Long term goals: (See Goal Flow Sheet for this  information)     Communication ability:  Patient appears to be able to clearly communicate and understand verbal and written communication and follow directions correctly.  Treatment Explanation - The following has been discussed with the patient:   RX ordered/plan of care  Anticipated outcomes  Possible risks and side effects  This patient would benefit from PT intervention to resume normal activities.   Rehab potential is good.    Frequency:  1 X week, once daily  Duration:  for 8 weeks  Discharge Plan:  Achieve all LTG.  Independent in home treatment program.  Reach maximal therapeutic benefit.    Please refer to the daily flowsheet for treatment today, total treatment time and time spent performing 1:1 timed codes.

## 2021-01-18 ENCOUNTER — OFFICE VISIT (OUTPATIENT)
Dept: PODIATRY | Facility: CLINIC | Age: 66
End: 2021-01-18
Payer: MEDICARE

## 2021-01-18 VITALS — DIASTOLIC BLOOD PRESSURE: 80 MMHG | OXYGEN SATURATION: 95 % | SYSTOLIC BLOOD PRESSURE: 115 MMHG | HEART RATE: 70 BPM

## 2021-01-18 DIAGNOSIS — L60.2 ONYCHAUXIS: ICD-10-CM

## 2021-01-18 DIAGNOSIS — M21.969 TYPE 2 DIABETES MELLITUS WITH DIABETIC FOOT DEFORMITY (H): ICD-10-CM

## 2021-01-18 DIAGNOSIS — E11.49 TYPE II OR UNSPECIFIED TYPE DIABETES MELLITUS WITH NEUROLOGICAL MANIFESTATIONS, NOT STATED AS UNCONTROLLED(250.60) (H): Primary | ICD-10-CM

## 2021-01-18 DIAGNOSIS — E11.69 TYPE 2 DIABETES MELLITUS WITH DIABETIC FOOT DEFORMITY (H): ICD-10-CM

## 2021-01-18 DIAGNOSIS — B35.1 ONYCHOMYCOSIS: ICD-10-CM

## 2021-01-18 PROCEDURE — 11719 TRIM NAIL(S) ANY NUMBER: CPT | Performed by: PODIATRIST

## 2021-01-18 PROCEDURE — 99214 OFFICE O/P EST MOD 30 MIN: CPT | Mod: 25 | Performed by: PODIATRIST

## 2021-01-18 RX ORDER — NYSTATIN 100000 U/G
CREAM TOPICAL 2 TIMES DAILY
Qty: 90 G | Refills: 5 | Status: SHIPPED | OUTPATIENT
Start: 2021-01-18 | End: 2022-06-14

## 2021-01-18 NOTE — NURSING NOTE
Elizabeth Palma's chief complaint for this visit includes:  Chief Complaint   Patient presents with     RECHECK     diabetic foot & toenail care - toenails turning brown and are painful     PCP: Horacio Sheridan    Referring Provider:  No referring provider defined for this encounter.    BP 98/68 (BP Location: Left arm, Patient Position: Sitting, Cuff Size: Adult Regular)   Pulse 70   LMP  (LMP Unknown)   SpO2 95%   Data Unavailable     Do you need any medication refills at today's visit? Sienna Wiseman CMA

## 2021-01-18 NOTE — PROGRESS NOTES
Past Medical History:   Diagnosis Date     Abnormal MRI, cervical spine 10/15/2011    2011; mild changes noted. Study done for left arm symptoms Impression:  1. Mild multilevel degenerative disc disease with no significant canal or neural stenosis seen. motion artifact on the STIR images in these are not interpretable. The remaining images were interpreted      Autosomal dominant polycystic kidney disease 2011     (Problem list name updated by automated process. Provider to review and confirm.)     CMC DJD(carpometacarpal degenerative joint disease), localized primary 3/5/2013     -donor kidney transplant 3/20/2014     Depressive disorder 11/15/2012     DM type 2 (diabetes mellitus, type 2) (H) 2013     Encounter for long-term (current) use of other medications 2015     Family history of tremor 10/17/2011     Gastroesophageal reflux disease      Generalized anxiety disorder 11/15/2012     Glaucoma      Hyperlipidemia 10/15/2011     Hyperparathyroidism, secondary (H) 2015     Hypertension     resolved     Immunosuppressed status (H) 3/20/2014     Major depressive disorder, recurrent episode, moderate (H) 11/15/2012     Obesity (BMI 30-39.9)      OP (osteoporosis) T score -3.8 2009 T-score -3.7      LION (obstructive sleep apnoea) 10/15/2012    intol to cpa     Pain in joint, forearm -- L unhealed Fx 2013     Premature menopause age 37 7/10/2012    OCP (vaginal bldg)-->HT which she stopped 2 mo later documented at 2007 visit (age 51).      Restless leg syndrome      Rib fractures 2013     Sensory loss 10/17/2011    Bottom of feet; uncertain if there is a neuropathy per notes.       Stiffness of joint, not elsewhere classified, hand 3/5/2013     Tremor 10/15/2011    head     Uncomplicated asthma      Patient Active Problem List   Diagnosis     Hypertension     Hyperlipidemia     Abnormal MRI, cervical spine     Sensory loss     Premature menopause age 37      OP (osteoporosis) T score -3.8     Major depressive disorder, recurrent episode, moderate (H)     Generalized anxiety disorder     CMC DJD(carpometacarpal degenerative joint disease), localized primary     Pain in joint, forearm -- L unhealed Fx     -donor kidney transplant     Immunosuppressed status (H)     Hyperparathyroidism, secondary (H)     Hyperparathyroidism (H)     Senile osteoporosis     Pain in joint involving ankle and foot     Nightmares associated with chronic post-traumatic stress disorder     Type 2 diabetes mellitus with peripheral neuropathy (H)     Posttraumatic stress disorder     Plantar fasciitis, bilateral     Right knee pain     Bilateral knee pain     Age-related osteoporosis without current pathological fracture     Narcissistic personality disorder (H)     Personal history of other drug therapy     Median sensory neuropathy, left     Marital conflict     Suicidal ideation     Autosomal dominant polycystic kidney disease     Secondary hyperparathyroidism (H)     Anemia, iron deficiency     Past Surgical History:   Procedure Laterality Date     ABDOMEN SURGERY       ANKLE SURGERY       C TRANSPLANTATION OF KIDNEY  2014     C/SECTION, LOW TRANSVERSE      x 2     CHOLECYSTECTOMY       COLONOSCOPY       ESOPHAGOSCOPY, GASTROSCOPY, DUODENOSCOPY (EGD), COMBINED N/A 2015    Procedure: COMBINED ESOPHAGOSCOPY, GASTROSCOPY, DUODENOSCOPY (EGD);  Surgeon: Sky Davey MD;  Location:  GI     ESOPHAGOSCOPY, GASTROSCOPY, DUODENOSCOPY (EGD), COMBINED N/A 2015    Procedure: COMBINED ESOPHAGOSCOPY, GASTROSCOPY, DUODENOSCOPY (EGD), BIOPSY SINGLE OR MULTIPLE;  Surgeon: Sky Davey MD;  Location:  GI     EYE SURGERY       LAPAROSCOPY, SURGICAL; REPAIR INCISIONAL OR VENTRAL HERNIA       LASER SURGERY OF EYE Left 10/01/2020    sever vitreous strands     ORTHOPEDIC SURGERY       HERMINIA EN Y BOWEL       WRIST SURGERY       Social History     Socioeconomic History      Marital status:      Spouse name: Rahat     Number of children: 2     Years of education: Not on file     Highest education level: Not on file   Occupational History     Comment: part-time   Social Needs     Financial resource strain: Not on file     Food insecurity     Worry: Not on file     Inability: Not on file     Transportation needs     Medical: Not on file     Non-medical: Not on file   Tobacco Use     Smoking status: Former Smoker     Smokeless tobacco: Never Used   Substance and Sexual Activity     Alcohol use: No     Alcohol/week: 0.0 standard drinks     Drug use: No     Sexual activity: Yes     Partners: Male     Birth control/protection: None     Comment: 1 partner   Lifestyle     Physical activity     Days per week: Not on file     Minutes per session: Not on file     Stress: Not on file   Relationships     Social connections     Talks on phone: Not on file     Gets together: Not on file     Attends Gnosticist service: Not on file     Active member of club or organization: Not on file     Attends meetings of clubs or organizations: Not on file     Relationship status: Not on file     Intimate partner violence     Fear of current or ex partner: Not on file     Emotionally abused: Not on file     Physically abused: Not on file     Forced sexual activity: Not on file   Other Topics Concern     Parent/sibling w/ CABG, MI or angioplasty before 65F 55M? Not Asked   Social History Narrative     Not on file     Family History   Problem Relation Age of Onset     Mental Illness Other         family hx     Heart Disease Other      Diabetes Other      Cancer Other      Genetic Disorder Father      Mental Illness Father      Diabetes Father      Hypertension Father      Hyperlipidemia Mother      Diabetes Mother      Hypertension Mother      Mental Illness Other      Diabetes Other      Glaucoma No family hx of      Macular Degeneration No family hx of      Lab Results   Component Value Date    A1C 6.7  09/03/2020    A1C 7.1 12/17/2019    A1C 7.2 04/24/2019    A1C 7.4 11/27/2018    A1C 6.4 01/19/2018     SUBJECTIVE FINDINGS:  This 65-year-old female returns to clinic for diabetic foot check and onychauxis, onychocryptosis. She relates she is getting pain in toes and they are changing colors which may be  From socks. Relates no ulcers or sores since we have seen her last, no injuries. She relates she is wearing diabetic shoes. She relates she does have some tingling in her feet.  She relates otherwise is doing well and using Coccanut oil and moisterizing lotion on feet.       OBJECTIVE FINDINGS:  DP and PT are 2/4 bilaterally. She has decreased ankle joint dorsiflexion bilaterally. She has dorsally contracted digits bilaterally 1-5. She has flexible flat foot with dorsal medial first MPJ prominence. Deep tendon reflexes are intact bilaterally. Sharp/dull is intact with 5.07 [place]-Yaneth monofilament bilaterally. There is no erythema, no drainage, no odor, no calor bilaterally. She has incurvated nails 1-5 bilaterally to differing degrees.  She has some thickening, dystrophy and subungual debris of toenails bilaterally with some subungual lint and dyscoloration from what appears to be sock dye.      ASSESSMENT AND PLAN: Onychauxis and Onychomycosis bilaterally causing pain. She is diabetic with peripheral neuropathy and has Hammertoes present. Diagnosis and treatment discussed with her. All the nails were reduced or debrided bilaterally upon consent. Prescription for Nystatin cream given and use discussed with her.  She will return to clinic to see me in about 2-3 months.

## 2021-01-18 NOTE — LETTER
2021         RE: Elizabeth Palma  [address]  [address]  [facility] [address]        Dear Colleague,    Thank you for referring your patient, Elizabeth Palma, to the [facility]. Please see a copy of my visit note below.    Past Medical History:   Diagnosis Date     Abnormal MRI, cervical spine 10/15/2011    2011; mild changes noted. Study done for left arm symptoms Impression:  1. Mild multilevel degenerative disc disease with no significant canal or neural stenosis seen. motion artifact on the STIR images in these are not interpretable. The remaining images were interpreted      Autosomal dominant polycystic kidney disease 2011     (Problem list name updated by automated process. Provider to review and confirm.)     CMC DJD(carpometacarpal degenerative joint disease), localized primary 3/5/2013     -donor kidney transplant 3/20/2014     Depressive disorder 11/15/2012     DM type 2 (diabetes mellitus, type 2) (H) 2013     Encounter for long-term (current) use of other medications 2015     Family history of tremor 10/17/2011     Gastroesophageal reflux disease      Generalized anxiety disorder 11/15/2012     Glaucoma      Hyperlipidemia 10/15/2011     Hyperparathyroidism, secondary (H) 2015     Hypertension     resolved     Immunosuppressed status (H) 3/20/2014     Major depressive disorder, recurrent episode, moderate (H) 11/15/2012     Obesity (BMI 30-39.9)      OP (osteoporosis) T score -3.8 2009 T-score -3.7      LION (obstructive sleep apnoea) 10/15/2012    intol to cpa     Pain in joint, forearm -- L unhealed Fx 2013     Premature menopause age 37 7/10/2012    OCP (vaginal bldg)-->HT which she stopped 2 mo later documented at 2007 visit (age 51).      Restless leg syndrome      Rib fractures 2013     Sensory loss 10/17/2011    Bottom of feet; uncertain if there is a neuropathy per notes.       Stiffness  of joint, not elsewhere classified, hand 3/5/2013     Tremor 10/15/2011    head     Uncomplicated asthma      Patient Active Problem List   Diagnosis     Hypertension     Hyperlipidemia     Abnormal MRI, cervical spine     Sensory loss     Premature menopause age 37     OP (osteoporosis) T score -3.8     Major depressive disorder, recurrent episode, moderate (H)     Generalized anxiety disorder     CMC DJD(carpometacarpal degenerative joint disease), localized primary     Pain in joint, forearm -- L unhealed Fx     -donor kidney transplant     Immunosuppressed status (H)     Hyperparathyroidism, secondary (H)     Hyperparathyroidism (H)     Senile osteoporosis     Pain in joint involving ankle and foot     Nightmares associated with chronic post-traumatic stress disorder     Type 2 diabetes mellitus with peripheral neuropathy (H)     Posttraumatic stress disorder     Plantar fasciitis, bilateral     Right knee pain     Bilateral knee pain     Age-related osteoporosis without current pathological fracture     Narcissistic personality disorder (H)     Personal history of other drug therapy     Median sensory neuropathy, left     Marital conflict     Suicidal ideation     Autosomal dominant polycystic kidney disease     Secondary hyperparathyroidism (H)     Anemia, iron deficiency     Past Surgical History:   Procedure Laterality Date     ABDOMEN SURGERY       ANKLE SURGERY       C TRANSPLANTATION OF KIDNEY  2014     C/SECTION, LOW TRANSVERSE      x 2     CHOLECYSTECTOMY       COLONOSCOPY       ESOPHAGOSCOPY, GASTROSCOPY, DUODENOSCOPY (EGD), COMBINED N/A 2015    Procedure: COMBINED ESOPHAGOSCOPY, GASTROSCOPY, DUODENOSCOPY (EGD);  Surgeon: Sky Davey MD;  Location:  GI     ESOPHAGOSCOPY, GASTROSCOPY, DUODENOSCOPY (EGD), COMBINED N/A 2015    Procedure: COMBINED ESOPHAGOSCOPY, GASTROSCOPY, DUODENOSCOPY (EGD), BIOPSY SINGLE OR MULTIPLE;  Surgeon: Sky Davey MD;  Location:  [facility]     EYE SURGERY       LAPAROSCOPY, SURGICAL; REPAIR INCISIONAL OR VENTRAL HERNIA       LASER SURGERY OF EYE Left 10/01/2020    sever vitreous strands     ORTHOPEDIC SURGERY       HERMINIA EN Y BOWEL  1990     WRIST SURGERY       Social History     Socioeconomic History     Marital status:      Spouse name: Rahat     Number of children: 2     Years of education: Not on file     Highest education level: Not on file   Occupational History     Comment: part-time   Social Needs     Financial resource strain: Not on file     Food insecurity     Worry: Not on file     Inability: Not on file     Transportation needs     Medical: Not on file     Non-medical: Not on file   Tobacco Use     Smoking status: Former Smoker     Smokeless tobacco: Never Used   Substance and Sexual Activity     Alcohol use: No     Alcohol/week: 0.0 standard drinks     Drug use: No     Sexual activity: Yes     Partners: Male     Birth control/protection: None     Comment: 1 partner   Lifestyle     Physical activity     Days per week: Not on file     Minutes per session: Not on file     Stress: Not on file   Relationships     Social connections     Talks on phone: Not on file     Gets together: Not on file     Attends Jain service: Not on file     Active member of club or organization: Not on file     Attends meetings of clubs or organizations: Not on file     Relationship status: Not on file     Intimate partner violence     Fear of current or ex partner: Not on file     Emotionally abused: Not on file     Physically abused: Not on file     Forced sexual activity: Not on file   Other Topics Concern     Parent/sibling w/ CABG, MI or angioplasty before 65F 55M? Not Asked   Social History Narrative     Not on file     Family History   Problem Relation Age of Onset     Mental Illness Other         family hx     Heart Disease Other      Diabetes Other      Cancer Other      Genetic Disorder Father      Mental Illness Father      Diabetes  Father      Hypertension Father      Hyperlipidemia Mother      Diabetes Mother      Hypertension Mother      Mental Illness Other      Diabetes Other      Glaucoma No family hx of      Macular Degeneration No family hx of      Lab Results   Component Value Date    A1C 6.7 09/03/2020    A1C 7.1 12/17/2019    A1C 7.2 04/24/2019    A1C 7.4 11/27/2018    A1C 6.4 01/19/2018     SUBJECTIVE FINDINGS:  This 65-year-old female returns to clinic for diabetic foot check and onychauxis, onychocryptosis. She relates she is getting pain in toes and they are changing colors which may be  From socks. Relates no ulcers or sores since we have seen her last, no injuries. She relates she is wearing diabetic shoes. She relates she does have some tingling in her feet.  She relates otherwise is doing well and using Coccanut oil and moisterizing lotion on feet.       OBJECTIVE FINDINGS:  DP and PT are 2/4 bilaterally. She has decreased ankle joint dorsiflexion bilaterally. She has dorsally contracted digits bilaterally 1-5. She has flexible flat foot with dorsal medial first MPJ prominence. Deep tendon reflexes are intact bilaterally. Sharp/dull is intact with 5.07 [place]-Yaneth monofilament bilaterally. There is no erythema, no drainage, no odor, no calor bilaterally. She has incurvated nails 1-5 bilaterally to differing degrees.  She has some thickening, dystrophy and subungual debris of toenails bilaterally with some subungual lint and dyscoloration from what appears to be sock dye.      ASSESSMENT AND PLAN: Onychauxis and Onychomycosis bilaterally causing pain. She is diabetic with peripheral neuropathy and has Hammertoes present. Diagnosis and treatment discussed with her. All the nails were reduced or debrided bilaterally upon consent. Prescription for Nystatin cream given and use discussed with her.  She will return to clinic to see me in about 2-3 months.       Again, thank you for allowing me to participate in the care of your  patient.        Sincerely,        Javier Tuttle DPM

## 2021-01-18 NOTE — PATIENT INSTRUCTIONS
Thanks for coming today.  [facility]  [address] [place] [address]    To schedule future appointments in Ortho Clinic, you may call [phone].    To schedule ordered imaging by your provider:   Call [facility] [phone]    To schedule an injection ordered by your provider:  Call Central [facility] Injection scheduling line: [phone]  [employer] available online at:  [email or web address]    Please call if any further questions or concerns ([phone]).  Clinic hours 8 am to 5 pm.    Return to clinic (call) if symptoms worsen or fail to improve.

## 2021-01-22 ENCOUNTER — THERAPY VISIT (OUTPATIENT)
Dept: PHYSICAL THERAPY | Facility: CLINIC | Age: 66
End: 2021-01-22
Payer: MEDICARE

## 2021-01-22 DIAGNOSIS — M25.561 CHRONIC PAIN OF BOTH KNEES: ICD-10-CM

## 2021-01-22 DIAGNOSIS — M25.562 CHRONIC PAIN OF BOTH KNEES: ICD-10-CM

## 2021-01-22 DIAGNOSIS — G89.29 CHRONIC PAIN OF BOTH KNEES: ICD-10-CM

## 2021-01-22 PROCEDURE — 97140 MANUAL THERAPY 1/> REGIONS: CPT | Mod: GP | Performed by: PHYSICAL THERAPIST

## 2021-01-22 PROCEDURE — 97110 THERAPEUTIC EXERCISES: CPT | Mod: GP | Performed by: PHYSICAL THERAPIST

## 2021-01-25 ENCOUNTER — OFFICE VISIT (OUTPATIENT)
Dept: INTERNAL MEDICINE | Facility: CLINIC | Age: 66
End: 2021-01-25
Payer: MEDICARE

## 2021-01-25 ENCOUNTER — ANCILLARY PROCEDURE (OUTPATIENT)
Dept: CT IMAGING | Facility: CLINIC | Age: 66
End: 2021-01-25
Attending: INTERNAL MEDICINE
Payer: MEDICARE

## 2021-01-25 VITALS
BODY MASS INDEX: 37.22 KG/M2 | HEART RATE: 68 BPM | DIASTOLIC BLOOD PRESSURE: 81 MMHG | SYSTOLIC BLOOD PRESSURE: 140 MMHG | OXYGEN SATURATION: 97 % | WEIGHT: 197 LBS

## 2021-01-25 DIAGNOSIS — E66.01 MORBID OBESITY (H): ICD-10-CM

## 2021-01-25 DIAGNOSIS — Z94.0 DECEASED-DONOR KIDNEY TRANSPLANT: ICD-10-CM

## 2021-01-25 DIAGNOSIS — R14.0 ABDOMINAL BLOATING: ICD-10-CM

## 2021-01-25 DIAGNOSIS — R10.84 ABDOMINAL PAIN, GENERALIZED: Primary | ICD-10-CM

## 2021-01-25 DIAGNOSIS — E11.42 TYPE 2 DIABETES MELLITUS WITH PERIPHERAL NEUROPATHY (H): ICD-10-CM

## 2021-01-25 DIAGNOSIS — R10.84 ABDOMINAL PAIN, GENERALIZED: ICD-10-CM

## 2021-01-25 DIAGNOSIS — N18.5 CHRONIC KIDNEY DISEASE, STAGE V (H): ICD-10-CM

## 2021-01-25 DIAGNOSIS — N18.31 STAGE 3A CHRONIC KIDNEY DISEASE (H): ICD-10-CM

## 2021-01-25 LAB
ALBUMIN SERPL-MCNC: 3.9 G/DL (ref 3.4–5)
ALP SERPL-CCNC: 67 U/L (ref 40–150)
ALT SERPL W P-5'-P-CCNC: 34 U/L (ref 0–50)
ANION GAP SERPL CALCULATED.3IONS-SCNC: 6 MMOL/L (ref 3–14)
AST SERPL W P-5'-P-CCNC: 12 U/L (ref 0–45)
BASOPHILS # BLD AUTO: 0.1 10E9/L (ref 0–0.2)
BASOPHILS NFR BLD AUTO: 0.9 %
BILIRUB SERPL-MCNC: 0.6 MG/DL (ref 0.2–1.3)
BUN SERPL-MCNC: 25 MG/DL (ref 7–30)
CALCIUM SERPL-MCNC: 9.9 MG/DL (ref 8.5–10.1)
CHLORIDE SERPL-SCNC: 97 MMOL/L (ref 94–109)
CO2 SERPL-SCNC: 34 MMOL/L (ref 20–32)
CREAT SERPL-MCNC: 1.2 MG/DL (ref 0.52–1.04)
CYCLOSPORINE BLD LC/MS/MS-MCNC: 99 UG/L (ref 50–400)
DIFFERENTIAL METHOD BLD: ABNORMAL
EOSINOPHIL # BLD AUTO: 0.2 10E9/L (ref 0–0.7)
EOSINOPHIL NFR BLD AUTO: 3.9 %
ERYTHROCYTE [DISTWIDTH] IN BLOOD BY AUTOMATED COUNT: 13.5 % (ref 10–15)
GFR SERPL CREATININE-BSD FRML MDRD: 48 ML/MIN/{1.73_M2}
GLUCOSE SERPL-MCNC: 187 MG/DL (ref 70–99)
HBA1C MFR BLD: 7.3 % (ref 0–5.6)
HCT VFR BLD AUTO: 46.1 % (ref 35–47)
HGB BLD-MCNC: 15.1 G/DL (ref 11.7–15.7)
IMM GRANULOCYTES # BLD: 0.1 10E9/L (ref 0–0.4)
IMM GRANULOCYTES NFR BLD: 1.3 %
LYMPHOCYTES # BLD AUTO: 0.5 10E9/L (ref 0.8–5.3)
LYMPHOCYTES NFR BLD AUTO: 9.4 %
MCH RBC QN AUTO: 29 PG (ref 26.5–33)
MCHC RBC AUTO-ENTMCNC: 32.8 G/DL (ref 31.5–36.5)
MCV RBC AUTO: 89 FL (ref 78–100)
MONOCYTES # BLD AUTO: 0.4 10E9/L (ref 0–1.3)
MONOCYTES NFR BLD AUTO: 6.8 %
NEUTROPHILS # BLD AUTO: 4.2 10E9/L (ref 1.6–8.3)
NEUTROPHILS NFR BLD AUTO: 77.7 %
NRBC # BLD AUTO: 0 10*3/UL
NRBC BLD AUTO-RTO: 0 /100
PLATELET # BLD AUTO: 202 10E9/L (ref 150–450)
POTASSIUM SERPL-SCNC: 3.6 MMOL/L (ref 3.4–5.3)
PROT SERPL-MCNC: 7 G/DL (ref 6.8–8.8)
RBC # BLD AUTO: 5.2 10E12/L (ref 3.8–5.2)
SODIUM SERPL-SCNC: 136 MMOL/L (ref 133–144)
TME LAST DOSE: NORMAL H
WBC # BLD AUTO: 5.4 10E9/L (ref 4–11)

## 2021-01-25 PROCEDURE — 83036 HEMOGLOBIN GLYCOSYLATED A1C: CPT | Performed by: PATHOLOGY

## 2021-01-25 PROCEDURE — 99214 OFFICE O/P EST MOD 30 MIN: CPT | Performed by: INTERNAL MEDICINE

## 2021-01-25 PROCEDURE — 85025 COMPLETE CBC W/AUTO DIFF WBC: CPT | Performed by: PATHOLOGY

## 2021-01-25 PROCEDURE — 80053 COMPREHEN METABOLIC PANEL: CPT | Performed by: PATHOLOGY

## 2021-01-25 PROCEDURE — 36415 COLL VENOUS BLD VENIPUNCTURE: CPT | Performed by: PATHOLOGY

## 2021-01-25 PROCEDURE — 74176 CT ABD & PELVIS W/O CONTRAST: CPT | Mod: MG | Performed by: RADIOLOGY

## 2021-01-25 PROCEDURE — G1004 CDSM NDSC: HCPCS | Performed by: RADIOLOGY

## 2021-01-25 PROCEDURE — 80158 DRUG ASSAY CYCLOSPORINE: CPT | Performed by: PATHOLOGY

## 2021-01-25 PROCEDURE — 80180 DRUG SCRN QUAN MYCOPHENOLATE: CPT | Performed by: PATHOLOGY

## 2021-01-25 RX ORDER — SIMVASTATIN 20 MG
20 TABLET ORAL AT BEDTIME
Qty: 90 TABLET | Refills: 3 | Status: SHIPPED | OUTPATIENT
Start: 2021-01-25 | End: 2021-11-26

## 2021-01-25 ASSESSMENT — PAIN SCALES - GENERAL: PAINLEVEL: MODERATE PAIN (5)

## 2021-01-25 NOTE — NURSING NOTE
Chief Complaint   Patient presents with     Recheck Medication     pt here to follow up, discuss retaining water       Stepan Plaza CMA, EMT at 9:50 AM on 1/25/2021.

## 2021-01-25 NOTE — PROGRESS NOTES
"ASSESSMENT/PLAN:  Patient insists she is gaining weight and fluid but in actuality her weight is stable and there is no sign of fluid retention (no peripheral edema or lung crackles).  I suspect her abdomen is getting bigger but not from fat but maybe from gas.  I wonder about gastroparesis, obstruction, mass effect, bacterial overgrowth.  I will start with a CT to investigate a number of these causes.    Fluid - I suspect she may be a bit dehydrated.  Talks about being dry and not urinating.  She is on lasix and metolazone which were started over the phone for \"fluid overload\".  Likely will stop the metolazone when labs/imaging return.  Also asked her to keep food and water diary - she says she is not eating but is gaining, says she is drinking a lot but not urinating    Sleep - seen by sleep medicine on 9/2/2020 (note reviewed).  Diagnosed with psychophysiologic insomnia, restless leg syndrome and mild LION.  See sleep psychology    Tremor - reviewed neurology note from 1/14/2021 showing likely familial essential tremor.  Wanted cyclosporine level (done today) and possible primidone for treatment.      Number and complexity of problems:  *** unstable chronic illness or with exacerbation  *** stable chronic illnesses  *** undiagnosed new problem  *** acute uncomplicated illness  *** acute illness with systemic symptoms    Amount and Complexity of Data Reviewed/Analyzed:  *** External notes reviewed  *** Unique test reviewed  *** Unique test ordered  Assessment requiring an independent historian    Independent interpretation of a test performed by another physician/other qualified health care professional (not separately reported): ***    Discussion of management or test interpretation with external physician/other qualified healthcare professional/appropriate source: ***    Risk of complication/morbidity of patient management:  Patient receiving prescription management  Diagnosis or treatment significantly limited " by social determinants of health: ***      *** minutes precharting, *** minutes for the visit and *** min on charting and updating records for a total of *** minutes spent on the date of the encounter doing chart review, history and exam, documentation and further activities as noted above    Video/Telephone visit start time: ***  Video/Telephone visit end time: ***    Horacio Sheridan MD, FACP      Chief complaint:  Elizabeth Palma is a 65 year old female presents for   Chief Complaint   Patient presents with     Recheck Medication     pt here to follow up, discuss retaining water        SUBJECTIVE:  She says she is retaining fluids - she says that her pants has gone up.  She was wearing a 14 and now a 2X (14,16,18, 20, 1x, 2x).  I pointed out that her weight is not changed (10/27/20 she was 194lb and now she is 197lb).  She says that her belly is bigger.  She says she is not eating much.  She is not urinating very much either.      She feels gassy and feels bloated.  She was constipated and took colace and goes a little each day.    She has a lot of nausea and vomits about 3 times a week.  She will vomit after 15 min to 2 hours after eating.      She needs diabetes labs.  She has not taken cyclosporin and MMF and wants labs.    ROS:  No burping.    She is exhausted all of the time.  She has a sleep  all of the time.  She sleep 2 hours a day and then fall asleep.  She wakes up tired.  Reviewed 10/27/20 note.    She has knee pain all of the time.  She gets laser therapy of the ankle and it is 98% better now (she gets a beam of light on the ankle).  She is getting laser and PT on both knees so hopefully this will help.    She has a tremor and was seen by neurology.  The neurologist wanted his MMF and cellcept levels done first.    She is forgetful and is seeing neurology for this.  She has had dementia ruled out.          Medications and allergies were reviewed by me today.       Patient Active Problem List     Diagnosis Date Noted     Type 2 diabetes mellitus with peripheral neuropathy (H) 2015     Priority: High     -donor kidney transplant 2014     Priority: High     Major depressive disorder, recurrent episode, moderate (H) 11/15/2012     Priority: High     OP (osteoporosis) T score -3.8 2009     Priority: High     2007 T-score -3.7       Anemia, iron deficiency 01/10/2020     Priority: Medium     Median sensory neuropathy, left 2019     Priority: Medium     Nerve conduction study down at [facility] in [place] on 3/7/19 shows: mild left median neuropathy at wrist, left median motor distal latency is normal, the left ulnar motor conduction velocity is abnormally slowed       Personal history of other drug therapy 2018     Priority: Medium     Marital conflict 2018     Priority: Medium     Suicidal ideation 2018     Priority: Medium     Narcissistic personality disorder (H) 2018     Priority: Medium     Age-related osteoporosis without current pathological fracture 08/10/2016     Priority: Medium     Right knee pain 2016     Priority: Medium     Bilateral knee pain 2016     Priority: Medium     Plantar fasciitis, bilateral 2016     Priority: Medium     Posttraumatic stress disorder 2015     Priority: Medium     Nightmares associated with chronic post-traumatic stress disorder 10/27/2015     Priority: Medium     Pain in joint involving ankle and foot 2015     Priority: Medium     Senile osteoporosis 2015     Priority: Medium     Hyperparathyroidism (H) 2015     Priority: Medium     Problem list name updated by automated process. Provider to review       Hyperparathyroidism, secondary (H) 2015     Priority: Medium     Secondary hyperparathyroidism (H) 2015     Priority: Medium     Immunosuppressed status (H) 2014     Priority: Medium     Pain in joint, forearm -- L unhealed Fx 2013      Priority: Medium     CMC DJD(carpometacarpal degenerative joint disease), localized primary 03/05/2013     Priority: Medium     Generalized anxiety disorder 11/15/2012     Priority: Medium     Premature menopause age 37 07/10/2012     Priority: Medium     OCP (vaginal bldg)-->HT which she stopped 2 mo later documented at Jan 12, 2007 visit (age 51).       Sensory loss 10/17/2011     Priority: Medium     Bottom of feet; uncertain if there is a neuropathy per notes.        Hypertension 10/15/2011     Priority: Medium     Hyperlipidemia 10/15/2011     Priority: Medium     Abnormal MRI, cervical spine 10/15/2011     Priority: Medium     4/2011; mild changes noted. Study done for left arm symptoms  Impression:   1. Mild multilevel degenerative disc disease with no significant canal  or neural stenosis seen. motion artifact on the STIR images in these  are not interpretable. The remaining images were interpreted       Autosomal dominant polycystic kidney disease 07/05/2011     Priority: Medium     Overview:   Overview:   (Problem list name updated by automated process. Provider to review and confirm.)         PHYSICAL EXAM:  BP (!) 140/81 (BP Location: Right arm, Patient Position: Sitting, Cuff Size: Adult Regular)   Pulse 68   Wt 89.4 kg (197 lb)   LMP  (LMP Unknown)   SpO2 97%   BMI 37.22 kg/m    Constitutional: no distress, comfortable, pleasant   Eyes: anicteric, normal extra-ocular movements   Cardiovascular: regular rate and rhythm, normal S1 and S2, no murmurs  Respiratory: clear to auscultation, no wheezes or crackles, normal breath sounds   Gastrointestinal: positive bowel sounds, nontender, no hepatosplenomegaly, no masses, tympanic to percussion  Musculoskeletal: no edema  Skin: visible blood vessels on the skin of the abdomen  Psychological: appropriate mood

## 2021-01-26 ENCOUNTER — THERAPY VISIT (OUTPATIENT)
Dept: PHYSICAL THERAPY | Facility: CLINIC | Age: 66
End: 2021-01-26
Payer: MEDICARE

## 2021-01-26 DIAGNOSIS — M25.562 CHRONIC PAIN OF BOTH KNEES: ICD-10-CM

## 2021-01-26 DIAGNOSIS — M25.561 CHRONIC PAIN OF BOTH KNEES: ICD-10-CM

## 2021-01-26 DIAGNOSIS — G89.29 CHRONIC PAIN OF BOTH KNEES: ICD-10-CM

## 2021-01-26 LAB
MYCOPHENOLATE SERPL LC/MS/MS-MCNC: 1.82 MG/L (ref 1–3.5)
MYCOPHENOLATE-G SERPL LC/MS/MS-MCNC: 90.6 MG/L (ref 30–95)
TME LAST DOSE: NORMAL H

## 2021-01-26 PROCEDURE — 97140 MANUAL THERAPY 1/> REGIONS: CPT | Mod: GP | Performed by: PHYSICAL THERAPIST

## 2021-01-26 PROCEDURE — 97110 THERAPEUTIC EXERCISES: CPT | Mod: GP | Performed by: PHYSICAL THERAPIST

## 2021-01-27 ENCOUNTER — TELEPHONE (OUTPATIENT)
Dept: PODIATRY | Facility: CLINIC | Age: 66
End: 2021-01-27

## 2021-01-27 ENCOUNTER — TELEPHONE (OUTPATIENT)
Dept: TRANSPLANT | Facility: CLINIC | Age: 66
End: 2021-01-27

## 2021-01-27 DIAGNOSIS — Z94.0 KIDNEY TRANSPLANTED: ICD-10-CM

## 2021-01-27 DIAGNOSIS — T86.11 KIDNEY TRANSPLANT REJECTION: ICD-10-CM

## 2021-01-27 DIAGNOSIS — Z48.298 AFTERCARE FOLLOWING ORGAN TRANSPLANT: Primary | ICD-10-CM

## 2021-01-27 NOTE — TELEPHONE ENCOUNTER
ISSUE  Creatinine 1.20 (baseline 0.9-1.0)  Recently seen by PCP for complaints of fluid overload. PCP was possibly going to discontinue metolazone.    PLAN  Call and assess hydration status.  How much water is she drinking per day?  Any recent illness, diarrhea, s/s of a UTI, or medication changes?  Any increased intake of alcohol or caffeine?  Recommend increasing hydration and repeating labs within 1-2 weeks.  Did her PCP discontinue her Metolazone?    LPN Task:  Please call with above and send lab orders.  Thanks!

## 2021-01-27 NOTE — TELEPHONE ENCOUNTER
1/27 Called and left voicemail. Explained we need to reschedule appointment on 4/27 with Dr. Tuttle. Provided phone number [phone] to reschedule.     Kezia Villafuerte   Procedure    Ortho/Sports Med/Pod/Ent/Eye  [facility]   [phone]

## 2021-01-27 NOTE — LETTER
PHYSICIAN ORDERS      DATE & TIME ISSUED: 2021 12:33 PM  PATIENT NAME: Elizabeth Palma   : 1955     [facility] MR# [if applicable]: [id number]     DIAGNOSIS:  Kidney transplant   ICD-10 CODE: Z94.0      Please complete the following labs in 1-2 weeks  BMP    Any questions please call: [phone] option 5    Please fax results to [phone].      Christian Jimenez

## 2021-01-28 VITALS — WEIGHT: 195 LBS | BODY MASS INDEX: 36.82 KG/M2 | HEIGHT: 61 IN

## 2021-01-28 ASSESSMENT — MIFFLIN-ST. JEOR: SCORE: 1376.89

## 2021-01-28 NOTE — TELEPHONE ENCOUNTER
Called Elizabeth to discuss stopping Metolazone. I advised her to call Dr. Sheridan's office to see if he had reviewed her CT and if stopping the Metolazone was advisable.   She verbalized understanding.  She states she will increase hydration and repeat labs at [facility] in 2 weeks.  Lab orders placed.

## 2021-01-28 NOTE — TELEPHONE ENCOUNTER
Call placed to patient. Patient state that she is extremely thirsty and confirm that she is still taking the metolazone. State that she has a lot of medical issues going on. Note that she recently had a abd CT. Patient v\u to improve hydration and repeat labs in 1-2 weeks. Order sent.

## 2021-01-28 NOTE — PATIENT INSTRUCTIONS
Your BMI is Body mass index is 36.84 kg/m .  Weight management is a personal decision.  If you are interested in exploring weight loss strategies, the following discussion covers the approaches that may be successful. Body mass index (BMI) is one way to tell whether you are at a healthy weight, overweight, or obese. It measures your weight in relation to your height.  A BMI of 18.5 to 24.9 is in the healthy range. A person with a BMI of 25 to 29.9 is considered overweight, and someone with a BMI of 30 or greater is considered obese. More than two-thirds of American adults are considered overweight or obese.  Being overweight or obese increases the risk for further weight gain. Excess weight may lead to heart disease and diabetes.  Creating and following plans for healthy eating and physical activity may help you improve your health.  Weight control is part of healthy lifestyle and includes exercise, emotional health, and healthy eating habits. Careful eating habits lifelong are the mainstay of weight control. Though there are significant health benefits from weight loss, long-term weight loss with diet alone may be very difficult to achieve- studies show long-term success with dietary management in less than 10% of people. Attaining a healthy weight may be especially difficult to achieve in those with severe obesity. In some cases, medications, devices and surgical management might be considered.  What can you do?  If you are overweight or obese and are interested in methods for weight loss, you should discuss this with your provider.     Consider reducing daily calorie intake by 500 calories.     Keep a food journal.     Avoiding skipping meals, consider cutting portions instead.    Diet combined with exercise helps maintain muscle while optimizing fat loss. Strength training is particularly important for building and maintaining muscle mass. Exercise helps reduce stress, increase energy, and improves fitness.  Increasing exercise without diet control, however, may not burn enough calories to loose weight.       Start walking three days a week 10-20 minutes at a time    Work towards walking thirty minutes five days a week     Eventually, increase the speed of your walking for 1-2 minutes at time    In addition, we recommend that you review healthy lifestyles and methods for weight loss available through the National Institutes of Health patient information sites:  http://win.niddk.nih.gov/publications/index.htm    And look into health and wellness programs that may be available through your health insurance provider, employer, local community center, or [employer].    Weight management plan: Discussed healthy diet and exercise guidelines

## 2021-01-28 NOTE — PROGRESS NOTES
"Elizabeth is a 65 year old who is being evaluated via a billable video visit.      How would you like to obtain your AVS? MyChart  If the video visit is dropped, the invitation should be resent by: Send to e-mail at: [email or web address]  Will anyone else be joining your video visit? No    Video Start Time: 1:02 PM  Video-Visit Details    Type of service:  Video Visit    Video End Time:1:42 PM    Originating Location (pt. Location): Home    Distant Location (provider location):  [facility]     Platform used for Video Visit: Angela LAROSE CMA, [facility], 1/28/2021 11:38 AM    SLEEP MEDICINE VIRTUAL VIDEO FOLLOW-UP VISIT  Sleep Psychology    Patient Name: Elizabeth Palma  MRN:  [id number]  Date of Service: Jan 29, 2021       Subjective Report     Elizabeth Palma  returns for a telehealth video visit to discuss progress in implementing behavioral strategies for the management of insomnia and EDS.  Patient consent for initiation of video visit was obtained and documented prior to initiation of visit.     Elizabeth reports she is she sleeping 8  Hours at night and about 2 hours during the day.    Usually going to bed about 11 PM.  She feels sleepy before than.  She states she is sleeping on and off during the day.  She gets up about 7 AM.    Not on any medications to promote wakefulness.    Reports she tried being more active but said it caused her to throwup and is over exerting.    Has been trying to take only one nap after lunch. She reports it is somewhat refreshing but she gets sleepy again after          .     Sleep Data:     Source of Sleep Estimates:  verbal self-report    ATotal score - [address]: 15 .    JUAN RAMON Total Score: 17       Interventions     Strategies and recommendations including scheduled napping to manage EDS, SCT were discussed today.       Vital Signs     Ht 1.549 m (5' 1\")   Wt 88.5 kg (195 lb)   LMP  (LMP Unknown)   BMI 36.84 kg/m       Mental Status "     Appearance:  Well kept  Orientation:  X3  Mood:  normal  Affect:  Congruent with mood  Speech/Language:  Normal  Thought Process: Intact  Associations:  Normal  Thought Content: Normal  Patient does not report any suicidal ideation, intention or plan.    Diagnostic Impressions and Plan     Chronic insomnia     Improved but Excessive daytime sleepiness persists    Plan:  continue current sleep schedule and plan and structure naps for 1 hour after lunch and 30 min after dinner    Follow-up: 4 weeks      Juan Quintanilla PsyD, DERRICK, DBSM  Diplomate, Behavioral Sleep Medicine  [facility]      Note: This dictation was created using voice recognition software. This document may contain an error not identified before finalizing the document. If the error changes the accuracy of the document, I would appreciate it being brought to my attention.

## 2021-01-29 ENCOUNTER — VIRTUAL VISIT (OUTPATIENT)
Dept: SLEEP MEDICINE | Facility: CLINIC | Age: 66
End: 2021-01-29
Payer: MEDICARE

## 2021-01-29 ENCOUNTER — THERAPY VISIT (OUTPATIENT)
Dept: PHYSICAL THERAPY | Facility: CLINIC | Age: 66
End: 2021-01-29
Payer: MEDICARE

## 2021-01-29 DIAGNOSIS — M25.561 CHRONIC PAIN OF BOTH KNEES: ICD-10-CM

## 2021-01-29 DIAGNOSIS — F51.04 CHRONIC INSOMNIA: Primary | ICD-10-CM

## 2021-01-29 DIAGNOSIS — M25.562 CHRONIC PAIN OF BOTH KNEES: ICD-10-CM

## 2021-01-29 DIAGNOSIS — G47.19 EXCESSIVE DAYTIME SLEEPINESS: ICD-10-CM

## 2021-01-29 DIAGNOSIS — G89.29 CHRONIC PAIN OF BOTH KNEES: ICD-10-CM

## 2021-01-29 PROBLEM — N18.30 CHRONIC KIDNEY DISEASE, STAGE 3 (H): Status: ACTIVE | Noted: 2021-01-29

## 2021-01-29 PROCEDURE — 97140 MANUAL THERAPY 1/> REGIONS: CPT | Mod: GP | Performed by: PHYSICAL THERAPIST

## 2021-01-29 PROCEDURE — 97110 THERAPEUTIC EXERCISES: CPT | Mod: GP | Performed by: PHYSICAL THERAPIST

## 2021-01-29 PROCEDURE — 90834 PSYTX W PT 45 MINUTES: CPT | Mod: 95 | Performed by: PSYCHOLOGIST

## 2021-01-29 NOTE — PROGRESS NOTES
"ASSESSMENT/PLAN:  Patient insists she is gaining weight and fluid but in actuality her weight is stable and there is no sign of fluid retention (no peripheral edema or lung crackles).  I suspect her abdomen is getting bigger but not from fat but maybe from gas.  I wonder about gastroparesis, obstruction, mass effect, bacterial overgrowth.  I will start with a CT to investigate a number of these causes.    Fluid status/CKD - I suspect she may be a bit dehydrated.  Talks about being dry and not urinating.  She is on lasix and metolazone which were started over the phone for \"fluid overload\".  Likely will stop the metolazone when labs/imaging return.  Also asked her to keep food and water diary - she says she is not eating but is gaining, says she is drinking a lot but not urinating    Sleep - seen by sleep medicine on 9/2/2020 (note reviewed).  Diagnosed with psychophysiologic insomnia, restless leg syndrome and mild LION.  See sleep psychology    Tremor - reviewed neurology note from 1/14/2021 showing likely familial essential tremor.  Wanted cyclosporine level (done today) and possible primidone for treatment.    Addendum:  CT and labs reviewed.  CT does not show any changes and shows no indication why she is feeling the symptoms she is feeling.  Additionally, her creat has increased which is likely from dehydration after adding the metolazone for edema. I called her and talked about possible constipation as the cause.  She does say that she is having fewer bowel movements.  Plan - stop metolazone, start daily Senna and Miralax, increase water intake.  She will let me know how things are going for her.    Number and complexity of problems:  2 unstable chronic illness or with exacerbation (insomnia, CKD)  1 undiagnosed new problem    Amount and Complexity of Data Reviewed/Analyzed:  0 External notes reviewed  2 Unique test reviewed  >3 Unique test ordered    Risk of complication/morbidity of patient " management:  Patient receiving prescription management    Horacio Sheridan MD, FACP      Chief complaint:  Elizabeth Palma is a 65 year old female presents for   Chief Complaint   Patient presents with     Recheck Medication     pt here to follow up, discuss retaining water        SUBJECTIVE:  She says she is retaining fluids - she says that her pants has gone up.  She was wearing a 14 and now a 2X (14,16,18, 20, 1x, 2x).  I pointed out that her weight is not changed (10/27/20 she was 194lb and now she is 197lb).  She says that her belly is bigger.  She says she is not eating much.  She is not urinating very much either.      She feels gassy and feels bloated.  She was constipated and took colace and goes a little each day.  She used to have a lot more in the past.  She has tried using Senna but it has not been helpful.      She has a lot of nausea and vomits about 3 times a week.  She will vomit after 15 min to 2 hours after eating.      She needs diabetes labs.  She has not taken cyclosporin and MMF and wants labs.    ROS:  No burping.    She is exhausted all of the time.  She has a sleep  all of the time.  She sleep 2 hours a day and then fall asleep.  She wakes up tired.  Reviewed 10/27/20 note.    She has knee pain all of the time.  She gets laser therapy of the ankle and it is 98% better now (she gets a beam of light on the ankle).  She is getting laser and PT on both knees so hopefully this will help.    She has a tremor and was seen by neurology.  The neurologist wanted his MMF and cellcept levels done first.    She is forgetful and is seeing neurology for this.  She has had dementia ruled out.          Medications and allergies were reviewed by me today.       Patient Active Problem List    Diagnosis Date Noted     Type 2 diabetes mellitus with peripheral neuropathy (H) 2015     Priority: High     -donor kidney transplant 2014     Priority: High     Major depressive disorder, recurrent  episode, moderate (H) 11/15/2012     Priority: High     OP (osteoporosis) T score -3.8 09/21/2009     Priority: High     2007 T-score -3.7       Anemia, iron deficiency 01/10/2020     Priority: Medium     Median sensory neuropathy, left 03/11/2019     Priority: Medium     Nerve conduction study down at [facility] in [place] on 3/7/19 shows: mild left median neuropathy at wrist, left median motor distal latency is normal, the left ulnar motor conduction velocity is abnormally slowed       Personal history of other drug therapy 11/13/2018     Priority: Medium     Marital conflict 08/08/2018     Priority: Medium     Suicidal ideation 08/07/2018     Priority: Medium     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     Priority: Medium     Age-related osteoporosis without current pathological fracture 08/10/2016     Priority: Medium     Right knee pain 02/08/2016     Priority: Medium     Bilateral knee pain 02/08/2016     Priority: Medium     Plantar fasciitis, bilateral 01/25/2016     Priority: Medium     Posttraumatic stress disorder 11/24/2015     Priority: Medium     Nightmares associated with chronic post-traumatic stress disorder 10/27/2015     Priority: Medium     Pain in joint involving ankle and foot 07/13/2015     Priority: Medium     Senile osteoporosis 05/29/2015     Priority: Medium     Hyperparathyroidism (H) 02/26/2015     Priority: Medium     Problem list name updated by automated process. Provider to review       Hyperparathyroidism, secondary (H) 02/25/2015     Priority: Medium     Secondary hyperparathyroidism (H) 02/25/2015     Priority: Medium     Immunosuppressed status (H) 03/20/2014     Priority: Medium     Pain in joint, forearm -- L unhealed Fx 05/21/2013     Priority: Medium     CMC DJD(carpometacarpal degenerative joint disease), localized primary 03/05/2013     Priority: Medium     Generalized anxiety disorder 11/15/2012     Priority: Medium     Premature menopause age 37  07/10/2012     Priority: Medium     OCP (vaginal bldg)-->HT which she stopped 2 mo later documented at Jan 12, 2007 visit (age 51).       Sensory loss 10/17/2011     Priority: Medium     Bottom of feet; uncertain if there is a neuropathy per notes.        Hypertension 10/15/2011     Priority: Medium     Hyperlipidemia 10/15/2011     Priority: Medium     Abnormal MRI, cervical spine 10/15/2011     Priority: Medium     4/2011; mild changes noted. Study done for left arm symptoms  Impression:   1. Mild multilevel degenerative disc disease with no significant canal  or neural stenosis seen. motion artifact on the STIR images in these  are not interpretable. The remaining images were interpreted       Autosomal dominant polycystic kidney disease 07/05/2011     Priority: Medium     Overview:   Overview:   (Problem list name updated by automated process. Provider to review and confirm.)         PHYSICAL EXAM:  BP (!) 140/81 (BP Location: Right arm, Patient Position: Sitting, Cuff Size: Adult Regular)   Pulse 68   Wt 89.4 kg (197 lb)   LMP  (LMP Unknown)   SpO2 97%   BMI 37.22 kg/m    Constitutional: no distress, comfortable, pleasant   Eyes: anicteric, normal extra-ocular movements   Cardiovascular: regular rate and rhythm, normal S1 and S2, no murmurs  Respiratory: clear to auscultation, no wheezes or crackles, normal breath sounds   Gastrointestinal: positive bowel sounds, nontender, no hepatosplenomegaly, no masses, tympanic to percussion  Musculoskeletal: no edema  Skin: visible blood vessels on the skin of the abdomen  Psychological: appropriate mood

## 2021-02-11 ENCOUNTER — HOSPITAL ENCOUNTER (OUTPATIENT)
Dept: LAB | Facility: CLINIC | Age: 66
Discharge: HOME | End: 2021-02-11
Attending: INTERNAL MEDICINE | Admitting: INTERNAL MEDICINE
Payer: MEDICARE

## 2021-02-11 DIAGNOSIS — Z48.298 AFTERCARE FOLLOWING ORGAN TRANSPLANT: ICD-10-CM

## 2021-02-11 DIAGNOSIS — Z94.0 KIDNEY TRANSPLANTED: ICD-10-CM

## 2021-02-11 LAB
ANION GAP SERPL CALCULATED.3IONS-SCNC: 5 MMOL/L (ref 3–14)
BUN SERPL-MCNC: 14 MG/DL (ref 7–30)
CALCIUM SERPL-MCNC: 9 MG/DL (ref 8.5–10.1)
CHLORIDE SERPL-SCNC: 112 MMOL/L (ref 94–109)
CO2 SERPL-SCNC: 23 MMOL/L (ref 20–32)
CREAT SERPL-MCNC: 1.01 MG/DL (ref 0.52–1.04)
GFR SERPL CREATININE-BSD FRML MDRD: 59 ML/MIN/{1.73_M2}
GLUCOSE SERPL-MCNC: 156 MG/DL (ref 70–99)
POTASSIUM SERPL-SCNC: 3.9 MMOL/L (ref 3.4–5.3)
SODIUM SERPL-SCNC: 140 MMOL/L (ref 133–144)

## 2021-02-11 PROCEDURE — 80048 BASIC METABOLIC PNL TOTAL CA: CPT | Performed by: INTERNAL MEDICINE

## 2021-02-11 PROCEDURE — 36415 COLL VENOUS BLD VENIPUNCTURE: CPT | Performed by: INTERNAL MEDICINE

## 2021-02-14 DIAGNOSIS — E11.42 TYPE 2 DIABETES MELLITUS WITH DIABETIC POLYNEUROPATHY, WITHOUT LONG-TERM CURRENT USE OF INSULIN (H): ICD-10-CM

## 2021-02-15 ENCOUNTER — VIRTUAL VISIT (OUTPATIENT)
Dept: ENDOCRINOLOGY | Facility: CLINIC | Age: 66
End: 2021-02-15
Payer: MEDICARE

## 2021-02-15 VITALS — WEIGHT: 197.7 LBS | HEIGHT: 61 IN | BODY MASS INDEX: 37.33 KG/M2

## 2021-02-15 DIAGNOSIS — E66.09 CLASS 1 OBESITY DUE TO EXCESS CALORIES WITHOUT SERIOUS COMORBIDITY IN ADULT, UNSPECIFIED BMI: Primary | ICD-10-CM

## 2021-02-15 DIAGNOSIS — E66.811 CLASS 1 OBESITY DUE TO EXCESS CALORIES WITHOUT SERIOUS COMORBIDITY IN ADULT, UNSPECIFIED BMI: Primary | ICD-10-CM

## 2021-02-15 PROCEDURE — 99212 OFFICE O/P EST SF 10 MIN: CPT | Mod: 95 | Performed by: INTERNAL MEDICINE

## 2021-02-15 ASSESSMENT — PAIN SCALES - GENERAL: PAINLEVEL: NO PAIN (0)

## 2021-02-15 ASSESSMENT — MIFFLIN-ST. JEOR: SCORE: 1389.14

## 2021-02-15 NOTE — PROGRESS NOTES
Elizabeth is a 65 year old who is being evaluated via a billable video visit.      How would you like to obtain your AVS? MyChart  If the video visit is dropped, the invitation should be resent by: Send to e-mail at: [email or web address]  Will anyone else be joining your video visit? Yes: . How would they like to receive their invitation? Send to e-mail at: [email or web address]    Video-Visit Details    Type of service:  Video Visit    Video call duration: 15 minutes.  I explained the conditions and plans (more than 50% of time was counseling/coordination of weight management).    Originating Location (pt. Location): Home    Distant Location (provider location):  [facility] [place]     Platform used for Video Visit: [email or web address]

## 2021-02-15 NOTE — PROGRESS NOTES
"    Return Medical Weight Management Note     Elizabeth Palma  MRN:  [id number]  :  1955  AYSE:  20    Dear Dr. Sheridan,      I had the pleasure of seeing your patient Elizabeth Palma.  She is a 63 year old female who I am continuing to see for treatment of obesity related to: DM-2, Hyperlipidemia, Sleep Apnea (has CPAP and does not use), GERD and Depression. S/p gastric bypass surgery in 1990.     CURRENT WEIGHT:   197 lbs 11.2 oz     Wt Readings from Last 4 Encounters:   02/15/21 89.7 kg (197 lb 11.2 oz)   21 88.5 kg (195 lb)   21 89.4 kg (197 lb)   10/19/20 77.6 kg (171 lb)     Body Mass Index:  Body mass index is 37.36 kg/m .  Vitals:  Ht 1.549 m (5' 1\")   Wt 89.7 kg (197 lb 11.2 oz)   LMP  (LMP Unknown)   BMI 37.36 kg/m      Initial consult weight was 214 on 2015.  Weight change since last seen on 10/17/19 is up 12 lbs.   Total loss is 41 pounds.    INTERVAL HISTORY:  Has gained weight since quarantined. Remains on topiramate 200 AM and 200 HS with no weight loss, stopped naltrexone as not helping. Remains off gabapentin, and pain is ok. Reports she has been struggling with frequent cravings for salty snacks like crackers, chips, and pretzels throughout the day, but primarily at night.  Denies any side effects. She feels full quickly ever since gastric bypass surgery in .      Diet Recall Review with Patient 2018   Do you typically eat breakfast? No   Do you typically eat lunch? Yes   If you do eat lunch, what types of food do you typically eat?  Eggs   Do you typically eat supper? Yes   If you do eat supper, what types of food do you typically eat? Fish or other protein    Do you typically eat snacks? Yes   If you do snack, what types of food do you typically eat? Fruit    How many glasses of juice do you drink in a typical day? 0   How many of glasses of milk do you drink in a typical day? 0   How many 8oz glasses of sugar containing drinks such as " Arnel-Aid/sweet tea do you drink in a day? 0   How many cans/bottles of sugar pop/soda/tea/sports drinks do you drink in a day? 0   How many cans/bottles of diet pop/soda/tea or sports drink do you drink in a day? 0   How often do you have a drink of alcohol? Never     MEDICATIONS:   Current Outpatient Medications   Medication     acetaminophen (TYLENOL) 325 MG tablet     acetylcysteine (N-ACETYL-L-CYSTEINE) 600 MG CAPS capsule     albuterol (PROAIR HFA/PROVENTIL HFA/VENTOLIN HFA) 108 (90 Base) MCG/ACT inhaler     ARIPiprazole (ABILIFY) 2 MG tablet     aspirin EC 81 MG EC tablet     blood glucose monitoring (ACCU-CHEK RALPH PLUS) meter device kit     blood glucose monitoring (NO BRAND SPECIFIED) meter device kit     blood glucose monitoring (NO BRAND SPECIFIED) test strip     blood glucose monitoring (SOFTCLIX) lancets     Cholecalciferol (VITAMIN D) 1000 UNITS capsule     cyanocolbalamin (VITAMIN  B-12) 1000 MCG tablet     cycloSPORINE modified (GENERIC EQUIVALENT) 25 MG capsule     cycloSPORINE modified (GENERIC EQUIVALENT) 25 MG capsule     econazole nitrate 1 % cream     estradiol (VAGIFEM) 10 MCG TABS vaginal tablet     ferrous sulfate (FEROSUL) 325 (65 Fe) MG tablet     fluticasone (FLONASE) 50 MCG/ACT nasal spray     furosemide (LASIX) 20 MG tablet     furosemide (LASIX) 40 MG tablet     gabapentin 300 MG PO capsule     latanoprost (XALATAN) 0.005 % ophthalmic solution     metFORMIN (GLUCOPHAGE-XR) 500 MG 24 hr tablet     metolazone (ZAROXOLYN) 5 MG tablet     mupirocin (BACTROBAN) 2 % external ointment     mycophenolate (GENERIC EQUIVALENT) 250 MG capsule     nystatin (MYCOSTATIN) 134515 UNIT/GM external cream     nystatin-triamcinolone (MYCOLOG II) 770133-7.1 UNIT/GM-% external cream     omeprazole (PRILOSEC) 40 MG DR capsule     ondansetron (ZOFRAN-ODT) 4 MG ODT tab     order for DME     order for DME     Polyvinyl Alcohol-Povidone (REFRESH OP)     prazosin (MINIPRESS) 2 MG capsule     prazosin (MINIPRESS)  "5 MG capsule     simvastatin (ZOCOR) 20 MG tablet     sulfamethoxazole-trimethoprim (BACTRIM) 400-80 MG tablet     topiramate (TOPAMAX) 200 MG tablet     Vaginal Lubricant (REPLENS) GEL     vilazodone (VIIBRYD) 40 MG TABS tablet     No current facility-administered medications for this visit.      Weight Loss Medication History Reviewed With Patient 2/15/2021   Which weight loss medications are you currently taking on a regular basis?  Topamax (topiramate)   Are you having any side effects from the weight loss medication that we have prescribed you? No   If you are having side effects please describe: none     ASSESSMENT:   Stopped phentermine due to balance problems but has maintained topiramate 200 BID.  Topiramate level was very generous on her intended dose. Advised she remove snacks from the home. Remains off gabapentin with acceptable neuropathy pain control.     FOLLOW-UP:    12 weeks.  Video call duration: 15 minutes.  I explained the conditions and plans (more than 50% of time was counseling/coordination of weight management).    Sincerely,  Prakash Irene MD     The patient was evaluated via a billable video visit and notified \"This visit will be via video call between you and your physician. If lab work is needed we can place an order and you can later stop by the lab. Video visits are billed at different rates depending on your insurance coverage.  Please reach out to your insurance provider with any questions. If the physician feels a video visit is not appropriate, you will not be charged for this service.\" Patient gave verbal consent for Video visit and would you like to obtain AVS by [employer].      "

## 2021-02-15 NOTE — NURSING NOTE
"Chief Complaint   Patient presents with     Follow Up     Follow-up Weight Management       Vitals:    02/15/21 1057   Weight: 89.7 kg (197 lb 11.2 oz)   Height: 1.549 m (5' 1\")       Body mass index is 37.36 kg/m .                            "

## 2021-02-15 NOTE — LETTER
"2/15/2021       RE: Elizabeth Palma  [address]  [address]  [facility] [address]     Dear Colleague,    Thank you for referring your patient, Elizabeth Palma, to the [facility] [place] at [facility]. Please see a copy of my visit note below.    Elizabeth is a 65 year old who is being evaluated via a billable video visit.      How would you like to obtain your AVS? MyChart  If the video visit is dropped, the invitation should be resent by: Send to e-mail at: [email or web address]  Will anyone else be joining your video visit? Yes: . How would they like to receive their invitation? Send to e-mail at: [email or web address]    Video-Visit Details    Type of service:  Video Visit    Video call duration: 15 minutes.  I explained the conditions and plans (more than 50% of time was counseling/coordination of weight management).    Originating Location (pt. Location): Home    Distant Location (provider location):  [facility] [place]     Platform used for Video Visit: [employer]        [facility] Weight Management Note     Elizabeth Palma  MRN:  [id number]  :  1955  AYSE:  20    Dear Dr. Sheridan,      I had the pleasure of seeing your patient Elizabeth Palma.  She is a 63 year old female who I am continuing to see for treatment of obesity related to: DM-2, Hyperlipidemia, Sleep Apnea (has CPAP and does not use), GERD and Depression. S/p gastric bypass surgery in 1990.     CURRENT WEIGHT:   197 lbs 11.2 oz     Wt Readings from Last 4 Encounters:   02/15/21 89.7 kg (197 lb 11.2 oz)   21 88.5 kg (195 lb)   21 89.4 kg (197 lb)   10/19/20 77.6 kg (171 lb)     Body Mass Index:  Body mass index is 37.36 kg/m .  Vitals:  Ht 1.549 m (5' 1\")   Wt 89.7 kg (197 lb 11.2 oz)   LMP  (LMP Unknown)   BMI 37.36 kg/m      Initial consult weight was 214 on 2015.  Weight change " since last seen on 10/17/19 is up 12 lbs.   Total loss is 41 pounds.    INTERVAL HISTORY:  Has gained weight since quarantined. Remains on topiramate 200 AM and 200 HS with no weight loss, stopped naltrexone as not helping. Remains off gabapentin, and pain is ok. Reports she has been struggling with frequent cravings for salty snacks like crackers, chips, and pretzels throughout the day, but primarily at night.  Denies any side effects. She feels full quickly ever since gastric bypass surgery in 1990.      Diet Recall Review with Patient 2/8/2018   Do you typically eat breakfast? No   Do you typically eat lunch? Yes   If you do eat lunch, what types of food do you typically eat?  Eggs   Do you typically eat supper? Yes   If you do eat supper, what types of food do you typically eat? Fish or other protein    Do you typically eat snacks? Yes   If you do snack, what types of food do you typically eat? Fruit    How many glasses of juice do you drink in a typical day? 0   How many of glasses of milk do you drink in a typical day? 0   How many 8oz glasses of sugar containing drinks such as Arnel-Aid/sweet tea do you drink in a day? 0   How many cans/bottles of sugar pop/soda/tea/sports drinks do you drink in a day? 0   How many cans/bottles of diet pop/soda/tea or sports drink do you drink in a day? 0   How often do you have a drink of alcohol? Never     MEDICATIONS:   Current Outpatient Medications   Medication     acetaminophen (TYLENOL) 325 MG tablet     acetylcysteine (N-ACETYL-L-CYSTEINE) 600 MG CAPS capsule     albuterol (PROAIR HFA/PROVENTIL HFA/VENTOLIN HFA) 108 (90 Base) MCG/ACT inhaler     ARIPiprazole (ABILIFY) 2 MG tablet     aspirin EC 81 MG EC tablet     blood glucose monitoring (ACCU-CHEK RALPH PLUS) meter device kit     blood glucose monitoring (NO BRAND SPECIFIED) meter device kit     blood glucose monitoring (NO BRAND SPECIFIED) test strip     blood glucose monitoring (SOFTCLIX) lancets      Cholecalciferol (VITAMIN D) 1000 UNITS capsule     cyanocolbalamin (VITAMIN  B-12) 1000 MCG tablet     cycloSPORINE modified (GENERIC EQUIVALENT) 25 MG capsule     cycloSPORINE modified (GENERIC EQUIVALENT) 25 MG capsule     econazole nitrate 1 % cream     estradiol (VAGIFEM) 10 MCG TABS vaginal tablet     ferrous sulfate (FEROSUL) 325 (65 Fe) MG tablet     fluticasone (FLONASE) 50 MCG/ACT nasal spray     furosemide (LASIX) 20 MG tablet     furosemide (LASIX) 40 MG tablet     gabapentin 300 MG PO capsule     latanoprost (XALATAN) 0.005 % ophthalmic solution     metFORMIN (GLUCOPHAGE-XR) 500 MG 24 hr tablet     metolazone (ZAROXOLYN) 5 MG tablet     mupirocin (BACTROBAN) 2 % external ointment     mycophenolate (GENERIC EQUIVALENT) 250 MG capsule     nystatin (MYCOSTATIN) 309713 UNIT/GM external cream     nystatin-triamcinolone (MYCOLOG II) 035275-8.1 UNIT/GM-% external cream     omeprazole (PRILOSEC) 40 MG DR capsule     ondansetron (ZOFRAN-ODT) 4 MG ODT tab     order for DME     order for DME     Polyvinyl Alcohol-Povidone (REFRESH OP)     prazosin (MINIPRESS) 2 MG capsule     prazosin (MINIPRESS) 5 MG capsule     simvastatin (ZOCOR) 20 MG tablet     sulfamethoxazole-trimethoprim (BACTRIM) 400-80 MG tablet     topiramate (TOPAMAX) 200 MG tablet     Vaginal Lubricant (REPLENS) GEL     vilazodone (VIIBRYD) 40 MG TABS tablet     No current facility-administered medications for this visit.      Weight Loss Medication History Reviewed With Patient 2/15/2021   Which weight loss medications are you currently taking on a regular basis?  Topamax (topiramate)   Are you having any side effects from the weight loss medication that we have prescribed you? No   If you are having side effects please describe: none     ASSESSMENT:   Stopped phentermine due to balance problems but has maintained topiramate 200 BID.  Topiramate level was very generous on her intended dose. Advised she remove snacks from the home. Remains off  "gabapentin with acceptable neuropathy pain control.     FOLLOW-UP:    12 weeks.  Video call duration: 15 minutes.  I explained the conditions and plans (more than 50% of time was counseling/coordination of weight management).    Sincerely,  Prakash Irene MD     The patient was evaluated via a billable video visit and notified \"This visit will be via video call between you and your physician. If lab work is needed we can place an order and you can later stop by the lab. Video visits are billed at different rates depending on your insurance coverage.  Please reach out to your insurance provider with any questions. If the physician feels a video visit is not appropriate, you will not be charged for this service.\" Patient gave verbal consent for Video visit and would you like to obtain AVS by [employer].      "

## 2021-02-17 DIAGNOSIS — N18.5 CHRONIC KIDNEY DISEASE, STAGE V (H): ICD-10-CM

## 2021-02-17 RX ORDER — METFORMIN HCL 500 MG
1500 TABLET, EXTENDED RELEASE 24 HR ORAL
Qty: 270 TABLET | Refills: 1 | Status: SHIPPED | OUTPATIENT
Start: 2021-02-17 | End: 2021-07-26

## 2021-02-17 RX ORDER — SIMVASTATIN 20 MG
20 TABLET ORAL AT BEDTIME
Qty: 90 TABLET | Refills: 3 | OUTPATIENT
Start: 2021-02-17

## 2021-02-17 NOTE — TELEPHONE ENCOUNTER
Last Clinic Visit: 1/25/2021  [facility] [place]

## 2021-02-19 ENCOUNTER — THERAPY VISIT (OUTPATIENT)
Dept: PHYSICAL THERAPY | Facility: CLINIC | Age: 66
End: 2021-02-19
Payer: MEDICARE

## 2021-02-19 DIAGNOSIS — G89.29 CHRONIC PAIN OF BOTH KNEES: ICD-10-CM

## 2021-02-19 DIAGNOSIS — M25.562 CHRONIC PAIN OF BOTH KNEES: ICD-10-CM

## 2021-02-19 DIAGNOSIS — M25.561 CHRONIC PAIN OF BOTH KNEES: ICD-10-CM

## 2021-02-19 PROCEDURE — 97140 MANUAL THERAPY 1/> REGIONS: CPT | Mod: GP | Performed by: PHYSICAL THERAPIST

## 2021-02-19 PROCEDURE — 97110 THERAPEUTIC EXERCISES: CPT | Mod: GP | Performed by: PHYSICAL THERAPIST

## 2021-02-26 ENCOUNTER — THERAPY VISIT (OUTPATIENT)
Dept: PHYSICAL THERAPY | Facility: CLINIC | Age: 66
End: 2021-02-26
Payer: MEDICARE

## 2021-02-26 ENCOUNTER — VIRTUAL VISIT (OUTPATIENT)
Dept: SLEEP MEDICINE | Facility: CLINIC | Age: 66
End: 2021-02-26
Payer: MEDICARE

## 2021-02-26 VITALS — HEIGHT: 61 IN | WEIGHT: 198 LBS | BODY MASS INDEX: 37.38 KG/M2

## 2021-02-26 DIAGNOSIS — M25.562 CHRONIC PAIN OF BOTH KNEES: ICD-10-CM

## 2021-02-26 DIAGNOSIS — G89.29 CHRONIC PAIN OF BOTH KNEES: ICD-10-CM

## 2021-02-26 DIAGNOSIS — M25.561 CHRONIC PAIN OF BOTH KNEES: ICD-10-CM

## 2021-02-26 DIAGNOSIS — F51.04 CHRONIC INSOMNIA: Primary | ICD-10-CM

## 2021-02-26 PROCEDURE — 97110 THERAPEUTIC EXERCISES: CPT | Mod: GP | Performed by: PHYSICAL THERAPIST

## 2021-02-26 PROCEDURE — 97140 MANUAL THERAPY 1/> REGIONS: CPT | Mod: GP | Performed by: PHYSICAL THERAPIST

## 2021-02-26 PROCEDURE — 97530 THERAPEUTIC ACTIVITIES: CPT | Mod: GP | Performed by: PHYSICAL THERAPIST

## 2021-02-26 ASSESSMENT — MIFFLIN-ST. JEOR: SCORE: 1390.5

## 2021-02-26 NOTE — PATIENT INSTRUCTIONS
Your BMI is Body mass index is 37.41 kg/m .  Weight management is a personal decision.  If you are interested in exploring weight loss strategies, the following discussion covers the approaches that may be successful. Body mass index (BMI) is one way to tell whether you are at a healthy weight, overweight, or obese. It measures your weight in relation to your height.  A BMI of 18.5 to 24.9 is in the healthy range. A person with a BMI of 25 to 29.9 is considered overweight, and someone with a BMI of 30 or greater is considered obese. More than two-thirds of American adults are considered overweight or obese.  Being overweight or obese increases the risk for further weight gain. Excess weight may lead to heart disease and diabetes.  Creating and following plans for healthy eating and physical activity may help you improve your health.  Weight control is part of healthy lifestyle and includes exercise, emotional health, and healthy eating habits. Careful eating habits lifelong are the mainstay of weight control. Though there are significant health benefits from weight loss, long-term weight loss with diet alone may be very difficult to achieve- studies show long-term success with dietary management in less than 10% of people. Attaining a healthy weight may be especially difficult to achieve in those with severe obesity. In some cases, medications, devices and surgical management might be considered.  What can you do?  If you are overweight or obese and are interested in methods for weight loss, you should discuss this with your provider.     Consider reducing daily calorie intake by 500 calories.     Keep a food journal.     Avoiding skipping meals, consider cutting portions instead.    Diet combined with exercise helps maintain muscle while optimizing fat loss. Strength training is particularly important for building and maintaining muscle mass. Exercise helps reduce stress, increase energy, and improves fitness.  Increasing exercise without diet control, however, may not burn enough calories to loose weight.       Start walking three days a week 10-20 minutes at a time    Work towards walking thirty minutes five days a week     Eventually, increase the speed of your walking for 1-2 minutes at time    In addition, we recommend that you review healthy lifestyles and methods for weight loss available through the National Institutes of Health patient information sites:  http://win.niddk.nih.gov/publications/index.htm    And look into health and wellness programs that may be available through your health insurance provider, employer, local community center, or [employer].    Weight management plan: Discussed healthy diet and exercise guidelines

## 2021-02-26 NOTE — PROGRESS NOTES
Elizabeth is a 65 year old who is being evaluated via a billable video visit.      How would you like to obtain your AVS? MyChart  If the video visit is dropped, the invitation should be resent by: Send to e-mail at: [email or web address]  Will anyone else be joining your video visit? No    Video Start Time: 1:34 PM repeated attempts per below.  Video-Visit Details    Type of service:  Video Visit    Video End Time:1:54 PM    Originating Location (pt. Location): Home    Distant Location (provider location):  [facility]     Platform used for Video Visit: AmWell unsuccessful, Doxy.me unsuccesfull, [employer]ity unsucessful.  Attempted phone went to   Left message with patient to reschedule at 1:45 PM      Juan Quintanilla PsyD

## 2021-03-01 ENCOUNTER — VIRTUAL VISIT (OUTPATIENT)
Dept: ENDOCRINOLOGY | Facility: CLINIC | Age: 66
End: 2021-03-01
Payer: MEDICARE

## 2021-03-01 VITALS — HEIGHT: 61 IN | WEIGHT: 196.9 LBS | BODY MASS INDEX: 37.17 KG/M2

## 2021-03-01 DIAGNOSIS — Z98.84 STATUS POST BARIATRIC SURGERY: ICD-10-CM

## 2021-03-01 DIAGNOSIS — E11.42 TYPE 2 DIABETES MELLITUS WITH DIABETIC POLYNEUROPATHY, WITHOUT LONG-TERM CURRENT USE OF INSULIN (H): ICD-10-CM

## 2021-03-01 DIAGNOSIS — E66.01 MORBID OBESITY (H): Primary | ICD-10-CM

## 2021-03-01 PROCEDURE — 99442 PR PHYSICIAN TELEPHONE EVALUATION 11-20 MIN: CPT | Mod: 95 | Performed by: INTERNAL MEDICINE

## 2021-03-01 ASSESSMENT — PAIN SCALES - GENERAL: PAINLEVEL: NO PAIN (0)

## 2021-03-01 ASSESSMENT — MIFFLIN-ST. JEOR: SCORE: 1385.51

## 2021-03-01 NOTE — PROGRESS NOTES
"    Return Medical Weight Management Note     Elizabeth Palma  MRN:  [id number]  :  1955  AYSE:  3/1/2021    Dear Horacio Sheridan MD,  I had the pleasure of seeing your patient Elizabeth Palma.  She is a 65 year old female who I am continuing to see for treatment of obesity related to:     2018   I have the following health issues associated with obesity: None of the above   I have the following symptoms associated with obesity: Lymphedema, None of the above     CURRENT WEIGHT:   196 lbs 14.4 oz    Wt Readings from Last 4 Encounters:   21 89.3 kg (196 lb 14.4 oz)   21 89.8 kg (198 lb)   02/15/21 89.7 kg (197 lb 11.2 oz)   21 88.5 kg (195 lb)     Height:  5' 1\"[per pt[  Body Mass Index:  Body mass index is 37.2 kg/m .  Vitals:  B/P: Data Unavailable, P: Data Unavailable    Initial consult weight was 214 on 2015.  Weight change since last seen on 2/15/2021 is down 1 pounds.   Total loss is 42 pounds.    INTERVAL HISTORY:  Lost some weight since not feeling well. Will send in her food record. Has started physical therapy. Back on topiramate 200 AM and 200 HS and says is eating very little, no naltrexone as not helping. Remains off gabapentin, and pain is ok. Reports she has been struggling with frequent cravings for salty snacks like crackers, chips, and pretzels throughout the day, but primarily at night.  Denies any side effects. She feels full quickly ever since gastric bypass surgery in .     Diet Recall Review with Patient 2018   Do you typically eat breakfast? No   Do you typically eat lunch? Yes   If you do eat lunch, what types of food do you typically eat?  Eggs   Do you typically eat supper? Yes   If you do eat supper, what types of food do you typically eat? Fish or other protein    Do you typically eat snacks? Yes   If you do snack, what types of food do you typically eat? Fruit    How many glasses of juice do you drink in a typical day? 0   How many of glasses of " milk do you drink in a typical day? 0   How many 8oz glasses of sugar containing drinks such as Arnel-Aid/sweet tea do you drink in a day? 0   How many cans/bottles of sugar pop/soda/tea/sports drinks do you drink in a day? 0   How many cans/bottles of diet pop/soda/tea or sports drink do you drink in a day? 0   How often do you have a drink of alcohol? Never       MEDICATIONS:   Current Outpatient Medications   Medication     acetaminophen (TYLENOL) 325 MG tablet     acetylcysteine (N-ACETYL-L-CYSTEINE) 600 MG CAPS capsule     albuterol (PROAIR HFA/PROVENTIL HFA/VENTOLIN HFA) 108 (90 Base) MCG/ACT inhaler     ARIPiprazole (ABILIFY) 2 MG tablet     aspirin EC 81 MG EC tablet     blood glucose monitoring (ACCU-CHEK RALPH PLUS) meter device kit     blood glucose monitoring (NO BRAND SPECIFIED) meter device kit     blood glucose monitoring (NO BRAND SPECIFIED) test strip     blood glucose monitoring (SOFTCLIX) lancets     Cholecalciferol (VITAMIN D) 1000 UNITS capsule     cyanocolbalamin (VITAMIN  B-12) 1000 MCG tablet     cycloSPORINE modified (GENERIC EQUIVALENT) 25 MG capsule     cycloSPORINE modified (GENERIC EQUIVALENT) 25 MG capsule     econazole nitrate 1 % cream     estradiol (VAGIFEM) 10 MCG TABS vaginal tablet     ferrous sulfate (FEROSUL) 325 (65 Fe) MG tablet     fluticasone (FLONASE) 50 MCG/ACT nasal spray     furosemide (LASIX) 20 MG tablet     furosemide (LASIX) 40 MG tablet     gabapentin 300 MG PO capsule     latanoprost (XALATAN) 0.005 % ophthalmic solution     metFORMIN (GLUCOPHAGE-XR) 500 MG 24 hr tablet     metolazone (ZAROXOLYN) 5 MG tablet     mupirocin (BACTROBAN) 2 % external ointment     mycophenolate (GENERIC EQUIVALENT) 250 MG capsule     nystatin (MYCOSTATIN) 848194 UNIT/GM external cream     nystatin-triamcinolone (MYCOLOG II) 733933-7.1 UNIT/GM-% external cream     omeprazole (PRILOSEC) 40 MG DR capsule     ondansetron (ZOFRAN-ODT) 4 MG ODT tab     order for DME     order for DME      "Polyvinyl Alcohol-Povidone (REFRESH OP)     prazosin (MINIPRESS) 2 MG capsule     prazosin (MINIPRESS) 5 MG capsule     simvastatin (ZOCOR) 20 MG tablet     sulfamethoxazole-trimethoprim (BACTRIM) 400-80 MG tablet     topiramate (TOPAMAX) 200 MG tablet     Vaginal Lubricant (REPLENS) GEL     vilazodone (VIIBRYD) 40 MG TABS tablet     No current facility-administered medications for this visit.      Weight Loss Medication History Reviewed With Patient 3/1/2021   Which weight loss medications are you currently taking on a regular basis?  Topamax (topiramate)   Are you having any side effects from the weight loss medication that we have prescribed you? No   If you are having side effects please describe: -     ASSESSMENT:   She reports weight loss too slow despite not eating much => will do food record to assess. At max topiramate dose.    FOLLOW-UP:    12 weeks.  Phone call duration: 15 minutes.  I explained the conditions and plans (more than 50% of time was counseling/coordination of weight management).    Sincerely,  Prakash Irene MD     The patient was evaluated via a billable telephone visit and notified:  \"This visit will be via telephone call between you and your physician. If lab work is needed we can place an order and you can later stop by the lab. Telephone visits are billed at different rates depending on your insurance coverage. During this emergency period, some insurers may be billed the same as an in-person visit.  Please check with your insurance provider with any questions. If the physician feels a telephone visit is not appropriate, you will not be charged for this service.\" Patient gave verbal consent for telephone visit and would you like to obtain AVS by [email or web address].      "

## 2021-03-01 NOTE — LETTER
"3/1/2021       RE: Elizabeth Palma  [address]  [address]  [facility] [address]     Dear Colleague,    Thank you for referring your patient, Elizabeth Palma, to the [facility] [place] at [facility]. Please see a copy of my visit note below.        Return Medical Weight Management Note     Elizabeth Palma  MRN:  [id number]  :  1955  AYSE:  3/1/2021    Dear Horacio Sheridan MD,  I had the pleasure of seeing your patient Elizabeth Palma.  She is a 65 year old female who I am continuing to see for treatment of obesity related to:     2018   I have the following health issues associated with obesity: None of the above   I have the following symptoms associated with obesity: Lymphedema, None of the above     CURRENT WEIGHT:   196 lbs 14.4 oz    Wt Readings from Last 4 Encounters:   21 89.3 kg (196 lb 14.4 oz)   21 89.8 kg (198 lb)   02/15/21 89.7 kg (197 lb 11.2 oz)   21 88.5 kg (195 lb)     Height:  5' 1\"[per pt[  Body Mass Index:  Body mass index is 37.2 kg/m .  Vitals:  B/P: Data Unavailable, P: Data Unavailable    Initial consult weight was 214 on 2015.  Weight change since last seen on 2/15/2021 is down 1 pounds.   Total loss is 42 pounds.    INTERVAL HISTORY:  Lost some weight since not feeling well. Will send in her food record. Has started physical therapy. Back on topiramate 200 AM and 200 HS and says is eating very little, no naltrexone as not helping. Remains off gabapentin, and pain is ok. Reports she has been struggling with frequent cravings for salty snacks like crackers, chips, and pretzels throughout the day, but primarily at night.  Denies any side effects. She feels full quickly ever since gastric bypass surgery in .     Diet Recall Review with Patient 2018   Do you typically eat breakfast? No   Do you typically eat lunch? Yes   If you do eat lunch, what types of food " do you typically eat?  Eggs   Do you typically eat supper? Yes   If you do eat supper, what types of food do you typically eat? Fish or other protein    Do you typically eat snacks? Yes   If you do snack, what types of food do you typically eat? Fruit    How many glasses of juice do you drink in a typical day? 0   How many of glasses of milk do you drink in a typical day? 0   How many 8oz glasses of sugar containing drinks such as Arnel-Aid/sweet tea do you drink in a day? 0   How many cans/bottles of sugar pop/soda/tea/sports drinks do you drink in a day? 0   How many cans/bottles of diet pop/soda/tea or sports drink do you drink in a day? 0   How often do you have a drink of alcohol? Never       MEDICATIONS:   Current Outpatient Medications   Medication     acetaminophen (TYLENOL) 325 MG tablet     acetylcysteine (N-ACETYL-L-CYSTEINE) 600 MG CAPS capsule     albuterol (PROAIR HFA/PROVENTIL HFA/VENTOLIN HFA) 108 (90 Base) MCG/ACT inhaler     ARIPiprazole (ABILIFY) 2 MG tablet     aspirin EC 81 MG EC tablet     blood glucose monitoring (ACCU-CHEK RALPH PLUS) meter device kit     blood glucose monitoring (NO BRAND SPECIFIED) meter device kit     blood glucose monitoring (NO BRAND SPECIFIED) test strip     blood glucose monitoring (SOFTCLIX) lancets     Cholecalciferol (VITAMIN D) 1000 UNITS capsule     cyanocolbalamin (VITAMIN  B-12) 1000 MCG tablet     cycloSPORINE modified (GENERIC EQUIVALENT) 25 MG capsule     cycloSPORINE modified (GENERIC EQUIVALENT) 25 MG capsule     econazole nitrate 1 % cream     estradiol (VAGIFEM) 10 MCG TABS vaginal tablet     ferrous sulfate (FEROSUL) 325 (65 Fe) MG tablet     fluticasone (FLONASE) 50 MCG/ACT nasal spray     furosemide (LASIX) 20 MG tablet     furosemide (LASIX) 40 MG tablet     gabapentin 300 MG PO capsule     latanoprost (XALATAN) 0.005 % ophthalmic solution     metFORMIN (GLUCOPHAGE-XR) 500 MG 24 hr tablet     metolazone (ZAROXOLYN) 5 MG tablet     mupirocin (BACTROBAN)  "2 % external ointment     mycophenolate (GENERIC EQUIVALENT) 250 MG capsule     nystatin (MYCOSTATIN) 636617 UNIT/GM external cream     nystatin-triamcinolone (MYCOLOG II) 267253-6.1 UNIT/GM-% external cream     omeprazole (PRILOSEC) 40 MG DR capsule     ondansetron (ZOFRAN-ODT) 4 MG ODT tab     order for DME     order for DME     Polyvinyl Alcohol-Povidone (REFRESH OP)     prazosin (MINIPRESS) 2 MG capsule     prazosin (MINIPRESS) 5 MG capsule     simvastatin (ZOCOR) 20 MG tablet     sulfamethoxazole-trimethoprim (BACTRIM) 400-80 MG tablet     topiramate (TOPAMAX) 200 MG tablet     Vaginal Lubricant (REPLENS) GEL     vilazodone (VIIBRYD) 40 MG TABS tablet     No current facility-administered medications for this visit.      Weight Loss Medication History Reviewed With Patient 3/1/2021   Which weight loss medications are you currently taking on a regular basis?  Topamax (topiramate)   Are you having any side effects from the weight loss medication that we have prescribed you? No   If you are having side effects please describe: -     ASSESSMENT:   She reports weight loss too slow despite not eating much => will do food record to assess. At max topiramate dose.    FOLLOW-UP:    12 weeks.  Phone call duration: 15 minutes.  I explained the conditions and plans (more than 50% of time was counseling/coordination of weight management).    Sincerely,  Prakash Irene MD     The patient was evaluated via a billable telephone visit and notified:  \"This visit will be via telephone call between you and your physician. If lab work is needed we can place an order and you can later stop by the lab. Telephone visits are billed at different rates depending on your insurance coverage. During this emergency period, some insurers may be billed the same as an in-person visit.  Please check with your insurance provider with any questions. If the physician feels a telephone visit is not appropriate, you will not be charged for " "this service.\" Patient gave verbal consent for telephone visit and would you like to obtain AVS by [facility].      "

## 2021-03-01 NOTE — NURSING NOTE
"(   Chief Complaint   Patient presents with     Weight Problem     MWM return    )    ( Weight: 89.3 kg (196 lb 14.4 oz)(per pt) )  ( Height: 154.9 cm (5' 1\")(per pt) )  ( BMI (Calculated): 37.2 )  (   )  ( Cumulative weight loss (lbs): 17.1 )  ( Last Visits Weight: 89.8 kg (198 lb) )  ( Wt change since last visit (lbs): -1.1 )  (   )  (   )    (   )  (   )  (   )  (   )  (   )  (   )  (   )    (   Patient Active Problem List   Diagnosis     Hypertension     Hyperlipidemia     Abnormal MRI, cervical spine     Sensory loss     Premature menopause age 37     OP (osteoporosis) T score -3.8     Major depressive disorder, recurrent episode, moderate (H)     Generalized anxiety disorder     CMC DJD(carpometacarpal degenerative joint disease), localized primary     Pain in joint, forearm -- L unhealed Fx     -donor kidney transplant     Immunosuppressed status (H)     Hyperparathyroidism, secondary (H)     Hyperparathyroidism (H)     Senile osteoporosis     Pain in joint involving ankle and foot     Nightmares associated with chronic post-traumatic stress disorder     Type 2 diabetes mellitus with peripheral neuropathy (H)     Posttraumatic stress disorder     Plantar fasciitis, bilateral     Right knee pain     Bilateral knee pain     Age-related osteoporosis without current pathological fracture     Narcissistic personality disorder (H)     Personal history of other drug therapy     Median sensory neuropathy, left     Marital conflict     Suicidal ideation     Autosomal dominant polycystic kidney disease     Secondary hyperparathyroidism (H)     Anemia, iron deficiency     Morbid obesity (H)     Chronic kidney disease, stage 3    )  (   Current Outpatient Medications   Medication Sig Dispense Refill     acetaminophen (TYLENOL) 325 MG tablet Take 325-650 mg by mouth as needed       acetylcysteine (N-ACETYL-L-CYSTEINE) 600 MG CAPS capsule 400 mg 1 cap daily 30 capsule      albuterol (PROAIR HFA/PROVENTIL " HFA/VENTOLIN HFA) 108 (90 Base) MCG/ACT inhaler Inhale 2 puffs into the lungs every 6 hours as needed for shortness of breath / dyspnea or wheezing 1 Inhaler 5     ARIPiprazole (ABILIFY) 2 MG tablet Take 1 tablet (2 mg) by mouth daily 90 tablet 1     aspirin EC 81 MG EC tablet Take 1 tablet (81 mg) by mouth daily 30 tablet 5     blood glucose monitoring (ACCU-CHEK RALPH PLUS) meter device kit   0     blood glucose monitoring (NO BRAND SPECIFIED) meter device kit Use to test blood sugar 2 times daily or as directed. 1 kit 0     blood glucose monitoring (NO BRAND SPECIFIED) test strip Use to test blood sugars 2 times daily or as directed 100 strip 11     blood glucose monitoring (SOFTCLIX) lancets Use to test blood sugar 2 times daily or as directed. 1 Box 11     Cholecalciferol (VITAMIN D) 1000 UNITS capsule 1,000 Units  30 capsule      cyanocolbalamin (VITAMIN  B-12) 1000 MCG tablet Take 1 tablet by mouth daily. 30 tablet 0     cycloSPORINE modified (GENERIC EQUIVALENT) 25 MG capsule Take 5 capsules (125 mg) by mouth 2 times daily TAKE 5 CAPSULES (125MG) BY MOUTH TWO TIMES A  capsule 11     cycloSPORINE modified (GENERIC EQUIVALENT) 25 MG capsule Take 5 capsules (125 mg) by mouth 2 times daily 300 capsule 6     econazole nitrate 1 % cream Apply topically daily 85 g 3     estradiol (VAGIFEM) 10 MCG TABS vaginal tablet Place 1 tablet (10 mcg) vaginally twice a week 30 tablet 3     ferrous sulfate (FEROSUL) 325 (65 Fe) MG tablet Take 1 tablet (325 mg) by mouth daily (with breakfast) 100 tablet 0     fluticasone (FLONASE) 50 MCG/ACT nasal spray Spray 1 spray into both nostrils daily 48 g 3     furosemide (LASIX) 20 MG tablet Take 1 tablet (20 mg) by mouth daily 90 tablet 3     furosemide (LASIX) 40 MG tablet Take 1 tablet (40 mg) by mouth daily 90 tablet 3     gabapentin 300 MG PO capsule Take 2 capsules (600 mg) by mouth At Bedtime 180 capsule 3     latanoprost (XALATAN) 0.005 % ophthalmic solution Place 1 drop  into both eyes At Bedtime 7.5 mL 2     metFORMIN (GLUCOPHAGE-XR) 500 MG 24 hr tablet Take 3 tablets (1,500 mg) by mouth daily (with dinner) 270 tablet 1     metolazone (ZAROXOLYN) 5 MG tablet Take 1 tablet (5 mg) by mouth daily as needed (while weight is above 190lbs) 30 tablet 3     mupirocin (BACTROBAN) 2 % external ointment Apply topically 3 times daily 1 g 0     mycophenolate (GENERIC EQUIVALENT) 250 MG capsule Take 4 capsules (1,000 mg) by mouth 2 times daily 240 capsule 11     nystatin (MYCOSTATIN) 173031 UNIT/GM external cream Apply topically 2 times daily To toenails. 90 g 5     nystatin-triamcinolone (MYCOLOG II) 992795-1.1 UNIT/GM-% external cream Apply topically 2 times daily To right big toe and toenail. 30 g 0     omeprazole (PRILOSEC) 40 MG DR capsule Take 1 capsule (40 mg) by mouth daily 90 capsule 3     ondansetron (ZOFRAN-ODT) 4 MG ODT tab Take 1 tablet (4 mg) by mouth every 6 hours as needed for nausea 20 tablet 2     order for DME Equipment being ordered: DME  [id number]   Ankle support, , fig 8, lace up 1 Device 0     order for DME Walker with front wheels and a seat. 1 Units 0     Polyvinyl Alcohol-Povidone (REFRESH OP) Apply to eye as needed Both eyes       prazosin (MINIPRESS) 2 MG capsule Take 2mg cap + 3 x 5mg caps (15mg) at bedtime (total dose=17mg) 90 capsule 1     prazosin (MINIPRESS) 5 MG capsule Take 3 x 5mg (15mg) caps + 1 x 2mg caps at bedtime (total dose=17mg) 270 capsule 1     simvastatin (ZOCOR) 20 MG tablet Take 1 tablet (20 mg) by mouth At Bedtime 90 tablet 3     sulfamethoxazole-trimethoprim (BACTRIM) 400-80 MG tablet Take 1 tablet by mouth daily 90 tablet 3     topiramate (TOPAMAX) 200 MG tablet Take 1 tablet (200 mg) by mouth 2 times daily 180 tablet 3     Vaginal Lubricant (REPLENS) GEL Use vaginally as needed. Can use up to 3 times per week. 35 g 11     vilazodone (VIIBRYD) 40 MG TABS tablet Take 1 tablet (40 mg) by mouth daily 90 tablet 1    )  ( Diabetes Eval:     )    ( Pain Eval:  No Pain (0) )    ( Wound Eval:       )    (   History   Smoking Status     Former Smoker   Smokeless Tobacco     Never Used    )    ( Signed By:  Nirav Villatoro, EMT; March 1, 2021; 11:14 AM )

## 2021-03-01 NOTE — PROGRESS NOTES
"Elizabeth Palma is a 65 year old female who is being evaluated via a billable video visit.      The patient has been notified of following:     \"This video visit will be conducted via a call between you and your physician/provider. We have found that certain health care needs can be provided without the need for an in-person physical exam.  This service lets us provide the care you need with a video conversation.  If a prescription is necessary we can send it directly to your pharmacy.  If lab work is needed we can place an order for that and you can then stop by our lab to have the test done at a later time.    If during the course of the call the physician/provider feels a video visit is not appropriate, you will not be charged for this service.\"     Patient confirmed that they are in [place] for today's visit     If the video visit is dropped, please send link to:   Text to cell phone: [phone]    Video-Visit Details  Type of service:  Video Visit    Video Start Time: {video visit start time:152948}  Video End Time:  {video visit start time:152948}    Originating Location (pt. Location): {video visit patient location:420797::\"Home\"}    Distant Location (provider location):  [facility] [place]     Platform used: {Virtual Visit Platforms:148555::\"Prosetta\"}              "

## 2021-03-02 NOTE — TELEPHONE ENCOUNTER
gabapentin (NEURONTIN) 300 MG capsule      Last Written Prescription Date:  2/21/20  Last Fill Quantity: 180,   # refills: 3  Last Office Visit : 10/28/20  Future Office visit:  None scheduled    Routing refill request to provider for review/approval because:  Drug controlled substance

## 2021-03-03 RX ORDER — GABAPENTIN 300 MG/1
600 CAPSULE ORAL AT BEDTIME
Qty: 180 CAPSULE | Refills: 3 | Status: SHIPPED | OUTPATIENT
Start: 2021-03-03 | End: 2022-01-08

## 2021-03-04 ENCOUNTER — VIRTUAL VISIT (OUTPATIENT)
Dept: PSYCHIATRY | Facility: CLINIC | Age: 66
End: 2021-03-04
Payer: MEDICARE

## 2021-03-04 DIAGNOSIS — F33.1 MAJOR DEPRESSIVE DISORDER, RECURRENT EPISODE, MODERATE (H): ICD-10-CM

## 2021-03-04 RX ORDER — ARIPIPRAZOLE 2 MG/1
3 TABLET ORAL DAILY
Qty: 135 TABLET | Refills: 1 | Status: SHIPPED | OUTPATIENT
Start: 2021-03-04 | End: 2021-07-08

## 2021-03-04 ASSESSMENT — PATIENT HEALTH QUESTIONNAIRE - PHQ9: SUM OF ALL RESPONSES TO PHQ QUESTIONS 1-9: 23
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
## 2021-03-05 ENCOUNTER — THERAPY VISIT (OUTPATIENT)
Dept: PHYSICAL THERAPY | Facility: CLINIC | Age: 66
End: 2021-03-05
Payer: MEDICARE

## 2021-03-05 DIAGNOSIS — M25.561 CHRONIC PAIN OF BOTH KNEES: ICD-10-CM

## 2021-03-05 DIAGNOSIS — G89.29 CHRONIC PAIN OF BOTH KNEES: ICD-10-CM

## 2021-03-05 DIAGNOSIS — M25.562 CHRONIC PAIN OF BOTH KNEES: ICD-10-CM

## 2021-03-05 PROCEDURE — 97110 THERAPEUTIC EXERCISES: CPT | Mod: GP | Performed by: PHYSICAL THERAPIST

## 2021-03-05 PROCEDURE — 97530 THERAPEUTIC ACTIVITIES: CPT | Mod: GP | Performed by: PHYSICAL THERAPIST

## 2021-03-05 PROCEDURE — 97140 MANUAL THERAPY 1/> REGIONS: CPT | Mod: GP | Performed by: PHYSICAL THERAPIST

## 2021-03-12 ENCOUNTER — THERAPY VISIT (OUTPATIENT)
Dept: PHYSICAL THERAPY | Facility: CLINIC | Age: 66
End: 2021-03-12
Payer: MEDICARE

## 2021-03-12 DIAGNOSIS — M25.562 CHRONIC PAIN OF BOTH KNEES: ICD-10-CM

## 2021-03-12 DIAGNOSIS — G89.29 CHRONIC PAIN OF BOTH KNEES: ICD-10-CM

## 2021-03-12 DIAGNOSIS — M25.561 CHRONIC PAIN OF BOTH KNEES: ICD-10-CM

## 2021-03-12 PROCEDURE — 97110 THERAPEUTIC EXERCISES: CPT | Mod: GP | Performed by: PHYSICAL THERAPIST

## 2021-03-12 PROCEDURE — 97530 THERAPEUTIC ACTIVITIES: CPT | Mod: GP | Performed by: PHYSICAL THERAPIST

## 2021-03-14 ENCOUNTER — TRANSFERRED RECORDS (OUTPATIENT)
Dept: HEALTH INFORMATION MANAGEMENT | Facility: CLINIC | Age: 66
End: 2021-03-14

## 2021-03-16 DIAGNOSIS — J30.2 SEASONAL ALLERGIC RHINITIS: ICD-10-CM

## 2021-03-17 RX ORDER — FLUTICASONE PROPIONATE 50 MCG
1 SPRAY, SUSPENSION (ML) NASAL DAILY
Qty: 48 G | Refills: 3 | Status: SHIPPED | OUTPATIENT
Start: 2021-03-17

## 2021-03-23 ENCOUNTER — VIRTUAL VISIT (OUTPATIENT)
Dept: NEPHROLOGY | Facility: CLINIC | Age: 66
End: 2021-03-23
Attending: INTERNAL MEDICINE
Payer: MEDICARE

## 2021-03-23 DIAGNOSIS — D84.9 IMMUNOSUPPRESSION (H): ICD-10-CM

## 2021-03-23 DIAGNOSIS — Z94.0 HTN, KIDNEY TRANSPLANT RELATED: ICD-10-CM

## 2021-03-23 DIAGNOSIS — Z94.0 KIDNEY REPLACED BY TRANSPLANT: Primary | ICD-10-CM

## 2021-03-23 DIAGNOSIS — I15.1 HTN, KIDNEY TRANSPLANT RELATED: ICD-10-CM

## 2021-03-23 DIAGNOSIS — D75.1 ERYTHROCYTOSIS DUE TO CYST OF KIDNEY: ICD-10-CM

## 2021-03-23 DIAGNOSIS — N28.1 ERYTHROCYTOSIS DUE TO CYST OF KIDNEY: ICD-10-CM

## 2021-03-23 PROCEDURE — 99442 PR PHYSICIAN TELEPHONE EVALUATION 11-20 MIN: CPT | Mod: 95 | Performed by: INTERNAL MEDICINE

## 2021-03-23 ASSESSMENT — PAIN SCALES - GENERAL: PAINLEVEL: NO PAIN (0)

## 2021-03-23 NOTE — LETTER
3/23/2021       RE: Elizabeth Palma  [address]  [address]  [facility] [address]     Dear Colleague,    Thank you for referring your patient, Elizabeth Palma, to the [facility] [place] at [facility]. Please see a copy of my visit note below.    Elizabeth is a 65 year old who is being evaluated via a billable telephone visit.      What phone number would you like to be contacted at? [phone]  How would you like to obtain your AVS? [employer]  Phone call duration: 16 minutes      CHRONIC TRANSPLANT NEPHROLOGY VISIT    Assessment & Plan   # DDKT: Stable   - Baseline Creatinine:  ~ 0.9-1   - Proteinuria: Normal (<0.2 grams)   - Date DSA Last Checked: Nov/2019      Latest DSA: No   - BK Viremia: No   - Kidney Tx Biopsy: Apr 16, 2014; Result: mild tubular epithelial injury with concern for acute CNI toxicity. Mild arteriosclerosis and arterial hyalinosis    -Labs every 3 months    # Immunosuppression: Cyclosporine (goal ) and Mycophenolate mofetil (dose 1000 mg every 12 hours)          - Continue with intensive monitoring of immunosuppression for efficacy and toxicity.          - Changes: Not at this time. MPA level 1/2021 was 1.82, therapeutic    # Infection Prophylaxis:   - PJP: Sulfa/TMP (Bactrim)    # Hypertension: Controlled;  Goal BP: < 130/80   - Changes: Not at this time. Her fluid status is stable on lasix 40mg daily. She is no longer taking metolazone    # Diabetes: Borderline control (HbA1c 7-9%) Last HbA1c: 7.3%   - Management as per primary team.    # Relative Erythrocytosis: Hgb: Stable;  On ACEI/ARB: No  Imaging: Yes - polycystic kidney disease. CT scan 1/25/21 without concerning cysts      # Mineral Bone Disorder:   - Secondary renal hyperparathyroidism; PTH level: Minimally elevated ( pg/ml)        On treatment: None, repeat with next labs  - Vitamin D; level: Not checked recently, but was normal last check         On supplement: Yes  - Calcium; level: Normal        On supplement: No  - Phosphorus; level: Normal        On supplement: No     # Obesity:    -Follows in weight loss clinic    # NELDA and MDD:   -Follows with psychiatry     # Skin Cancer Risk:    - Discussed sun protection and recommend regular follow up with Dermatology.    # COVID-19 Virus Review: Discussed COVID-19 virus and the potential medical risks.  Reviewed preventative health recommendations, which includes washing hands for 20 seconds, avoid touching your face, and social distancing.  Asked patient to inform the transplant center if they are exposed or diagnosed with this virus.    # COVID Vaccine Completed: No Going to receive 1st vaccine on 3/25/21      # Medical Compliance: Yes    # Transplant History:  Etiology of Kidney Failure: Polycystic kidney disease (PKD)  Tx: DDKT  Transplant: 3/20/2014 (Kidney)  Significant changes in immunosuppression: None  Significant transplant-related complications: None    Transplant Office Phone Number: [phone]    Assessment and plan was discussed with the patient and she voiced her understanding and agreement.    Return visit: No follow-ups on file.    Francisco Jordan MD    Chief Complaint   Ms. Palma is a 65 year old here for routine follow up, kidney transplant and immunosuppression management.    History of Present Illness   The patietn feels well. She was accidentally taking only lasix 20mg daily and when increased to 40mg daily her swelling went away. The patient states that vomits 1-2x/week, which has been happening for the past few years. She states that she has diarrhea 2x/week, which has also been ongoing. She denies any UTIs. She denies chest pain, SOB, fever, chills.     Home BP: Not checked    Problem List   Patient Active Problem List   Diagnosis     Hypertension     Hyperlipidemia     Abnormal MRI, cervical spine     Sensory loss     Premature menopause age 37     OP (osteoporosis) T score -3.8      Major depressive disorder, recurrent episode, moderate (H)     Generalized anxiety disorder     CMC DJD(carpometacarpal degenerative joint disease), localized primary     Pain in joint, forearm -- L unhealed Fx     -donor kidney transplant     Immunosuppressed status (H)     Hyperparathyroidism, secondary (H)     Hyperparathyroidism (H)     Senile osteoporosis     Pain in joint involving ankle and foot     Nightmares associated with chronic post-traumatic stress disorder     Type 2 diabetes mellitus with peripheral neuropathy (H)     Posttraumatic stress disorder     Plantar fasciitis, bilateral     Right knee pain     Bilateral knee pain     Age-related osteoporosis without current pathological fracture     Narcissistic personality disorder (H)     Personal history of other drug therapy     Median sensory neuropathy, left     Marital conflict     Suicidal ideation     Autosomal dominant polycystic kidney disease     Secondary hyperparathyroidism (H)     Anemia, iron deficiency     Morbid obesity (H)     Chronic kidney disease, stage 3       Allergies   Allergies   Allergen Reactions     Percocet [Oxycodone-Acetaminophen] Nausea and Vomiting     Novocain [Procaine Hcl] Hives     Had reaction 25 years ago to old renetta. Pt reports multiple injections of lidocaine since then without reaction.  Tolerated lidocaine injection today without difficulty.  Osmar Mark MD IR Service.       Medications   Current Outpatient Medications   Medication Sig     acetaminophen (TYLENOL) 325 MG tablet Take 325-650 mg by mouth as needed     acetylcysteine (N-ACETYL-L-CYSTEINE) 600 MG CAPS capsule 400 mg 1 cap daily     albuterol (PROAIR HFA/PROVENTIL HFA/VENTOLIN HFA) 108 (90 Base) MCG/ACT inhaler Inhale 2 puffs into the lungs every 6 hours as needed for shortness of breath / dyspnea or wheezing     ARIPiprazole (ABILIFY) 2 MG tablet Take 1.5 tablets (3 mg) by mouth daily     aspirin EC 81 MG EC tablet Take 1 tablet (81 mg)  by mouth daily     blood glucose monitoring (ACCU-CHEK RALPH PLUS) meter device kit      blood glucose monitoring (NO BRAND SPECIFIED) meter device kit Use to test blood sugar 2 times daily or as directed.     blood glucose monitoring (NO BRAND SPECIFIED) test strip Use to test blood sugars 2 times daily or as directed     blood glucose monitoring (SOFTCLIX) lancets Use to test blood sugar 2 times daily or as directed.     Cholecalciferol (VITAMIN D) 1000 UNITS capsule 1,000 Units      cyanocolbalamin (VITAMIN  B-12) 1000 MCG tablet Take 1 tablet by mouth daily.     cycloSPORINE modified (GENERIC EQUIVALENT) 25 MG capsule Take 5 capsules (125 mg) by mouth 2 times daily TAKE 5 CAPSULES (125MG) BY MOUTH TWO TIMES A DAY     cycloSPORINE modified (GENERIC EQUIVALENT) 25 MG capsule Take 5 capsules (125 mg) by mouth 2 times daily     econazole nitrate 1 % cream Apply topically daily     estradiol (VAGIFEM) 10 MCG TABS vaginal tablet Place 1 tablet (10 mcg) vaginally twice a week     ferrous sulfate (FEROSUL) 325 (65 Fe) MG tablet Take 1 tablet (325 mg) by mouth daily (with breakfast)     fluticasone (FLONASE) 50 MCG/ACT nasal spray Spray 1 spray into both nostrils daily     furosemide (LASIX) 40 MG tablet Take 1 tablet (40 mg) by mouth daily     gabapentin (NEURONTIN) 300 MG capsule Take 2 capsules (600 mg) by mouth At Bedtime     latanoprost (XALATAN) 0.005 % ophthalmic solution Place 1 drop into both eyes At Bedtime     metFORMIN (GLUCOPHAGE-XR) 500 MG 24 hr tablet Take 3 tablets (1,500 mg) by mouth daily (with dinner)     metolazone (ZAROXOLYN) 5 MG tablet Take 1 tablet (5 mg) by mouth daily as needed (while weight is above 190lbs)     mupirocin (BACTROBAN) 2 % external ointment Apply topically 3 times daily     mycophenolate (GENERIC EQUIVALENT) 250 MG capsule Take 4 capsules (1,000 mg) by mouth 2 times daily     nystatin (MYCOSTATIN) 093010 UNIT/GM external cream Apply topically 2 times daily To toenails.      nystatin-triamcinolone (MYCOLOG II) 439070-2.1 UNIT/GM-% external cream Apply topically 2 times daily To right big toe and toenail.     omeprazole (PRILOSEC) 40 MG DR capsule Take 1 capsule (40 mg) by mouth daily     ondansetron (ZOFRAN-ODT) 4 MG ODT tab Take 1 tablet (4 mg) by mouth every 6 hours as needed for nausea     order for DME Equipment being ordered: DME  [id number]   Ankle support, , fig 8, lace up     order for DME Walker with front wheels and a seat.     Polyvinyl Alcohol-Povidone (REFRESH OP) Apply to eye as needed Both eyes     prazosin (MINIPRESS) 2 MG capsule Take 2mg cap + 3 x 5mg caps (15mg) at bedtime (total dose=17mg)     prazosin (MINIPRESS) 5 MG capsule Take 3 x 5mg (15mg) caps + 1 x 2mg caps at bedtime (total dose=17mg)     simvastatin (ZOCOR) 20 MG tablet Take 1 tablet (20 mg) by mouth At Bedtime     sulfamethoxazole-trimethoprim (BACTRIM) 400-80 MG tablet Take 1 tablet by mouth daily     topiramate (TOPAMAX) 200 MG tablet Take 1 tablet (200 mg) by mouth 2 times daily     Vaginal Lubricant (REPLENS) GEL Use vaginally as needed. Can use up to 3 times per week.     vilazodone (VIIBRYD) 40 MG TABS tablet Take 1 tablet (40 mg) by mouth daily     No current facility-administered medications for this visit.      Medications Discontinued During This Encounter   Medication Reason     furosemide (LASIX) 20 MG tablet        Physical Exam   Vital Signs: LMP  (LMP Unknown)     Physical exam was deferred for this telemedicine visit.    Data     Renal Latest Ref Rng & Units 2/11/2021 1/25/2021 10/29/2020   Na 133 - 144 mmol/L 140 136 137   K 3.4 - 5.3 mmol/L 3.9 3.6 3.9   Cl 94 - 109 mmol/L 112(H) 97 107   CO2 20 - 32 mmol/L 23 34(H) 25   BUN 7 - 30 mg/dL 14 25 15   Cr 0.52 - 1.04 mg/dL 1.01 1.20(H) 0.98   Glucose 70 - 99 mg/dL 156(H) 187(H) 349(H)   Ca  8.5 - 10.1 mg/dL 9.0 9.9 8.6   Mg 1.6 - 2.3 mg/dL - - -     Bone Health Latest Ref Rng & Units 8/19/2019 10/30/2018 5/19/2017   Phos 2.5 - 4.5  mg/dL 3.2 3.6 3.2   PTHi 18 - 80 pg/mL - 85(H) 76(H)   Vit D Def 20 - 75 ug/L - - -     Heme Latest Ref Rng & Units 1/25/2021 9/3/2020 2/24/2020   WBC 4.0 - 11.0 10e9/L 5.4 4.2 4.1   Hgb 11.7 - 15.7 g/dL 15.1 14.6 13.5   Plt 150 - 450 10e9/L 202 196 148(L)   ABSOLUTE NEUTROPHIL 1.6 - 8.3 10e9/L 4.2 3.2 -   ABSOLUTE LYMPHOCYTES 0.8 - 5.3 10e9/L 0.5(L) 0.5(L) -   ABSOLUTE MONOCYTES 0.0 - 1.3 10e9/L 0.4 0.3 -   ABSOLUTE EOSINOPHILS 0.0 - 0.7 10e9/L 0.2 0.1 -   ABSOLUTE BASOPHILS 0.0 - 0.2 10e9/L 0.1 0.0 -   ABS IMMATURE GRANULOCYTES 0 - 0.4 10e9/L 0.1 0.0 -   ABSOLUTE NUCLEATED RBC - 0.0 0.0 -     Liver Latest Ref Rng & Units 1/25/2021 10/29/2020 9/3/2020   AP 40 - 150 U/L 67 55 60   TBili 0.2 - 1.3 mg/dL 0.6 0.5 0.4   DBili 0.0 - 0.2 mg/dL - - -   ALT 0 - 50 U/L 34 40 43   AST 0 - 45 U/L 12 17 20   Tot Protein 6.8 - 8.8 g/dL 7.0 6.1(L) 6.6(L)   Albumin 3.4 - 5.0 g/dL 3.9 3.5 3.8     Pancreas Latest Ref Rng & Units 1/25/2021 9/3/2020 12/17/2019   A1C 0 - 5.6 % 7.3(H) 6.7(H) 7.1(H)   A1C (POC) 4.3 - 6 % - - -   Lipase 20 - 250 U/L - - -     Iron studies Latest Ref Rng & Units 2/24/2020 12/17/2019 6/2/2015   Iron 35 - 180 ug/dL 72 29(L) -   Iron sat 15 - 46 % 27 6(L) -   Ferritin 8 - 252 ng/mL 128 4(L) 10     [facility] Txp Virology Latest Ref Rng & Units 7/29/2019 1/22/2019 8/28/2018   CMV IgG EU/mL - - -   BK Spec - EDTA PLASMA Plasma 08/28/2018 21:00   BK Res BKNEG:BK Virus DNA Not Detected copies/mL BK Virus DNA Not Detected BK Virus DNA Not Detected BK Virus DNA Not Detected   BK Log <2.7 Log copies/mL Not Calculated Not Calculated Not Calculated   EBV IgG - - - -   EBV VCA IGG ANTIBODY U/mL - - -   EBV CAPSID ANTIBODY IGG 0.0 - 0.8 AI - - -   Hep B Core NEG - - -   Hep B Surf - - - -   HIV 1&2 NEG - - -        Recent Labs   Lab Test 07/16/14  0917 07/22/14  0909 06/02/15  0848   DOSTAC 7/15/14  2100 7/21/14 AT 2100 6/1/15 2100   TACROL 8.4 10.9 Test canceled - Lab  error     Recent Labs   Lab Test  11/26/19  0921 01/02/20  0907 01/25/21  1118   DOSMPA Not Provided Not Provided 01/24/2021 2300   MPACID 16.66* 2.31 1.82   MPAG 109.8* 73.8 90.6       Again, thank you for allowing me to participate in the care of your patient.      Sincerely,    Francisco Jordan MD

## 2021-03-23 NOTE — PROGRESS NOTES
Elizabeth is a 65 year old who is being evaluated via a billable telephone visit.      What phone number would you like to be contacted at? [phone]  How would you like to obtain your AVS? [email or web address]  Phone call duration: 16 minutes      CHRONIC TRANSPLANT NEPHROLOGY VISIT    Assessment & Plan   # DDKT: Stable   - Baseline Creatinine:  ~ 0.9-1   - Proteinuria: Normal (<0.2 grams)   - Date DSA Last Checked: Nov/2019      Latest DSA: No   - BK Viremia: No   - Kidney Tx Biopsy: Apr 16, 2014; Result: mild tubular epithelial injury with concern for acute CNI toxicity. Mild arteriosclerosis and arterial hyalinosis    -Labs every 3 months    # Immunosuppression: Cyclosporine (goal ) and Mycophenolate mofetil (dose 1000 mg every 12 hours)          - Continue with intensive monitoring of immunosuppression for efficacy and toxicity.          - Changes: Not at this time. MPA level 1/2021 was 1.82, therapeutic    # Infection Prophylaxis:   - PJP: Sulfa/TMP (Bactrim)    # Hypertension: Controlled;  Goal BP: < 130/80   - Changes: Not at this time. Her fluid status is stable on lasix 40mg daily. She is no longer taking metolazone    # Diabetes: Borderline control (HbA1c 7-9%) Last HbA1c: 7.3%   - Management as per primary team.    # Relative Erythrocytosis: Hgb: Stable;  On ACEI/ARB: No  Imaging: Yes - polycystic kidney disease. CT scan 1/25/21 without concerning cysts      # Mineral Bone Disorder:   - Secondary renal hyperparathyroidism; PTH level: Minimally elevated ( pg/ml)        On treatment: None, repeat with next labs  - Vitamin D; level: Not checked recently, but was normal last check        On supplement: Yes  - Calcium; level: Normal        On supplement: No  - Phosphorus; level: Normal        On supplement: No     # Obesity:    -Follows in weight loss clinic    # NELDA and MDD:   -Follows with psychiatry     # Skin Cancer Risk:    - Discussed sun protection and recommend regular follow up with Dermatology.    #  COVID-19 Virus Review: Discussed COVID-19 virus and the potential medical risks.  Reviewed preventative health recommendations, which includes washing hands for 20 seconds, avoid touching your face, and social distancing.  Asked patient to inform the transplant center if they are exposed or diagnosed with this virus.    # COVID Vaccine Completed: No Going to receive 1st vaccine on 3/25/21      # Medical Compliance: Yes    # Transplant History:  Etiology of Kidney Failure: Polycystic kidney disease (PKD)  Tx: DDKT  Transplant: 3/20/2014 (Kidney)  Significant changes in immunosuppression: None  Significant transplant-related complications: None    Transplant Office Phone Number: [phone]    Assessment and plan was discussed with the patient and she voiced her understanding and agreement.    Return visit: No follow-ups on file.    Francisco Jordan MD    Chief Complaint   Ms. Palma is a 65 year old here for routine follow up, kidney transplant and immunosuppression management.    History of Present Illness   The patietn feels well. She was accidentally taking only lasix 20mg daily and when increased to 40mg daily her swelling went away. The patient states that vomits 1-2x/week, which has been happening for the past few years. She states that she has diarrhea 2x/week, which has also been ongoing. She denies any UTIs. She denies chest pain, SOB, fever, chills.     Home BP: Not checked    Problem List   Patient Active Problem List   Diagnosis     Hypertension     Hyperlipidemia     Abnormal MRI, cervical spine     Sensory loss     Premature menopause age 37     OP (osteoporosis) T score -3.8     Major depressive disorder, recurrent episode, moderate (H)     Generalized anxiety disorder     CMC DJD(carpometacarpal degenerative joint disease), localized primary     Pain in joint, forearm -- L unhealed Fx     -donor kidney transplant     Immunosuppressed status (H)     Hyperparathyroidism, secondary (H)      Hyperparathyroidism (H)     Senile osteoporosis     Pain in joint involving ankle and foot     Nightmares associated with chronic post-traumatic stress disorder     Type 2 diabetes mellitus with peripheral neuropathy (H)     Posttraumatic stress disorder     Plantar fasciitis, bilateral     Right knee pain     Bilateral knee pain     Age-related osteoporosis without current pathological fracture     Narcissistic personality disorder (H)     Personal history of other drug therapy     Median sensory neuropathy, left     Marital conflict     Suicidal ideation     Autosomal dominant polycystic kidney disease     Secondary hyperparathyroidism (H)     Anemia, iron deficiency     Morbid obesity (H)     Chronic kidney disease, stage 3       Allergies   Allergies   Allergen Reactions     Percocet [Oxycodone-Acetaminophen] Nausea and Vomiting     Novocain [Procaine Hcl] Hives     Had reaction 25 years ago to old renetta. Pt reports multiple injections of lidocaine since then without reaction.  Tolerated lidocaine injection today without difficulty.  Osmar Mark MD IR Service.       Medications   Current Outpatient Medications   Medication Sig     acetaminophen (TYLENOL) 325 MG tablet Take 325-650 mg by mouth as needed     acetylcysteine (N-ACETYL-L-CYSTEINE) 600 MG CAPS capsule 400 mg 1 cap daily     albuterol (PROAIR HFA/PROVENTIL HFA/VENTOLIN HFA) 108 (90 Base) MCG/ACT inhaler Inhale 2 puffs into the lungs every 6 hours as needed for shortness of breath / dyspnea or wheezing     ARIPiprazole (ABILIFY) 2 MG tablet Take 1.5 tablets (3 mg) by mouth daily     aspirin EC 81 MG EC tablet Take 1 tablet (81 mg) by mouth daily     blood glucose monitoring (ACCU-CHEK RALPH PLUS) meter device kit      blood glucose monitoring (NO BRAND SPECIFIED) meter device kit Use to test blood sugar 2 times daily or as directed.     blood glucose monitoring (NO BRAND SPECIFIED) test strip Use to test blood sugars 2 times daily or as directed      blood glucose monitoring (SOFTCLIX) lancets Use to test blood sugar 2 times daily or as directed.     Cholecalciferol (VITAMIN D) 1000 UNITS capsule 1,000 Units      cyanocolbalamin (VITAMIN  B-12) 1000 MCG tablet Take 1 tablet by mouth daily.     cycloSPORINE modified (GENERIC EQUIVALENT) 25 MG capsule Take 5 capsules (125 mg) by mouth 2 times daily TAKE 5 CAPSULES (125MG) BY MOUTH TWO TIMES A DAY     cycloSPORINE modified (GENERIC EQUIVALENT) 25 MG capsule Take 5 capsules (125 mg) by mouth 2 times daily     econazole nitrate 1 % cream Apply topically daily     estradiol (VAGIFEM) 10 MCG TABS vaginal tablet Place 1 tablet (10 mcg) vaginally twice a week     ferrous sulfate (FEROSUL) 325 (65 Fe) MG tablet Take 1 tablet (325 mg) by mouth daily (with breakfast)     fluticasone (FLONASE) 50 MCG/ACT nasal spray Spray 1 spray into both nostrils daily     furosemide (LASIX) 40 MG tablet Take 1 tablet (40 mg) by mouth daily     gabapentin (NEURONTIN) 300 MG capsule Take 2 capsules (600 mg) by mouth At Bedtime     latanoprost (XALATAN) 0.005 % ophthalmic solution Place 1 drop into both eyes At Bedtime     metFORMIN (GLUCOPHAGE-XR) 500 MG 24 hr tablet Take 3 tablets (1,500 mg) by mouth daily (with dinner)     metolazone (ZAROXOLYN) 5 MG tablet Take 1 tablet (5 mg) by mouth daily as needed (while weight is above 190lbs)     mupirocin (BACTROBAN) 2 % external ointment Apply topically 3 times daily     mycophenolate (GENERIC EQUIVALENT) 250 MG capsule Take 4 capsules (1,000 mg) by mouth 2 times daily     nystatin (MYCOSTATIN) 375162 UNIT/GM external cream Apply topically 2 times daily To toenails.     nystatin-triamcinolone (MYCOLOG II) 775141-6.1 UNIT/GM-% external cream Apply topically 2 times daily To right big toe and toenail.     omeprazole (PRILOSEC) 40 MG DR capsule Take 1 capsule (40 mg) by mouth daily     ondansetron (ZOFRAN-ODT) 4 MG ODT tab Take 1 tablet (4 mg) by mouth every 6 hours as needed for nausea     order  for DME Equipment being ordered: DME  [id number]   Ankle support, sm, fig 8, lace up     order for DME Walker with front wheels and a seat.     Polyvinyl Alcohol-Povidone (REFRESH OP) Apply to eye as needed Both eyes     prazosin (MINIPRESS) 2 MG capsule Take 2mg cap + 3 x 5mg caps (15mg) at bedtime (total dose=17mg)     prazosin (MINIPRESS) 5 MG capsule Take 3 x 5mg (15mg) caps + 1 x 2mg caps at bedtime (total dose=17mg)     simvastatin (ZOCOR) 20 MG tablet Take 1 tablet (20 mg) by mouth At Bedtime     sulfamethoxazole-trimethoprim (BACTRIM) 400-80 MG tablet Take 1 tablet by mouth daily     topiramate (TOPAMAX) 200 MG tablet Take 1 tablet (200 mg) by mouth 2 times daily     Vaginal Lubricant (REPLENS) GEL Use vaginally as needed. Can use up to 3 times per week.     vilazodone (VIIBRYD) 40 MG TABS tablet Take 1 tablet (40 mg) by mouth daily     No current facility-administered medications for this visit.      Medications Discontinued During This Encounter   Medication Reason     furosemide (LASIX) 20 MG tablet        Physical Exam   Vital Signs: LMP  (LMP Unknown)     Physical exam was deferred for this telemedicine visit.    Data     Renal Latest Ref Rng & Units 2/11/2021 1/25/2021 10/29/2020   Na 133 - 144 mmol/L 140 136 137   K 3.4 - 5.3 mmol/L 3.9 3.6 3.9   Cl 94 - 109 mmol/L 112(H) 97 107   CO2 20 - 32 mmol/L 23 34(H) 25   BUN 7 - 30 mg/dL 14 25 15   Cr 0.52 - 1.04 mg/dL 1.01 1.20(H) 0.98   Glucose 70 - 99 mg/dL 156(H) 187(H) 349(H)   Ca  8.5 - 10.1 mg/dL 9.0 9.9 8.6   Mg 1.6 - 2.3 mg/dL - - -     Bone Health Latest Ref Rng & Units 8/19/2019 10/30/2018 5/19/2017   Phos 2.5 - 4.5 mg/dL 3.2 3.6 3.2   PTHi 18 - 80 pg/mL - 85(H) 76(H)   Vit D Def 20 - 75 ug/L - - -     Heme Latest Ref Rng & Units 1/25/2021 9/3/2020 2/24/2020   WBC 4.0 - 11.0 10e9/L 5.4 4.2 4.1   Hgb 11.7 - 15.7 g/dL 15.1 14.6 13.5   Plt 150 - 450 10e9/L 202 196 148(L)   ABSOLUTE NEUTROPHIL 1.6 - 8.3 10e9/L 4.2 3.2 -   ABSOLUTE LYMPHOCYTES 0.8  - 5.3 10e9/L 0.5(L) 0.5(L) -   ABSOLUTE MONOCYTES 0.0 - 1.3 10e9/L 0.4 0.3 -   ABSOLUTE EOSINOPHILS 0.0 - 0.7 10e9/L 0.2 0.1 -   ABSOLUTE BASOPHILS 0.0 - 0.2 10e9/L 0.1 0.0 -   ABS IMMATURE GRANULOCYTES 0 - 0.4 10e9/L 0.1 0.0 -   ABSOLUTE NUCLEATED RBC - 0.0 0.0 -     Liver Latest Ref Rng & Units 1/25/2021 10/29/2020 9/3/2020   AP 40 - 150 U/L 67 55 60   TBili 0.2 - 1.3 mg/dL 0.6 0.5 0.4   DBili 0.0 - 0.2 mg/dL - - -   ALT 0 - 50 U/L 34 40 43   AST 0 - 45 U/L 12 17 20   Tot Protein 6.8 - 8.8 g/dL 7.0 6.1(L) 6.6(L)   Albumin 3.4 - 5.0 g/dL 3.9 3.5 3.8     Pancreas Latest Ref Rng & Units 1/25/2021 9/3/2020 12/17/2019   A1C 0 - 5.6 % 7.3(H) 6.7(H) 7.1(H)   A1C (POC) 4.3 - 6 % - - -   Lipase 20 - 250 U/L - - -     Iron studies Latest Ref Rng & Units 2/24/2020 12/17/2019 6/2/2015   Iron 35 - 180 ug/dL 72 29(L) -   Iron sat 15 - 46 % 27 6(L) -   Ferritin 8 - 252 ng/mL 128 4(L) 10     [facility] Txp Virology Latest Ref Rng & Units 7/29/2019 1/22/2019 8/28/2018   CMV IgG EU/mL - - -   BK Spec - EDTA PLASMA Plasma 08/28/2018 21:00   BK Res BKNEG:BK Virus DNA Not Detected copies/mL BK Virus DNA Not Detected BK Virus DNA Not Detected BK Virus DNA Not Detected   BK Log <2.7 Log copies/mL Not Calculated Not Calculated Not Calculated   EBV IgG - - - -   EBV VCA IGG ANTIBODY U/mL - - -   EBV CAPSID ANTIBODY IGG 0.0 - 0.8 AI - - -   Hep B Core NEG - - -   Hep B Surf - - - -   HIV 1&2 NEG - - -        Recent Labs   Lab Test 07/16/14  0917 07/22/14  0909 06/02/15  0848   DOSTAC 7/15/14  2100 7/21/14 AT 2100 6/1/15 2100   TACROL 8.4 10.9 Test canceled - Lab  error     Recent Labs   Lab Test 11/26/19  0921 01/02/20  0907 01/25/21  1118   DOSMPA Not Provided Not Provided 01/24/2021 2300   MPACID 16.66* 2.31 1.82   MPAG 109.8* 73.8 90.6

## 2021-03-24 ENCOUNTER — TELEPHONE (OUTPATIENT)
Dept: TRANSPLANT | Facility: CLINIC | Age: 66
End: 2021-03-24

## 2021-03-24 DIAGNOSIS — Z48.298 AFTERCARE FOLLOWING ORGAN TRANSPLANT: Primary | ICD-10-CM

## 2021-03-24 DIAGNOSIS — Z94.0 KIDNEY REPLACED BY TRANSPLANT: ICD-10-CM

## 2021-03-24 DIAGNOSIS — Z94.0 KIDNEY TRANSPLANTED: ICD-10-CM

## 2021-03-24 DIAGNOSIS — Z79.899 ENCOUNTER FOR LONG-TERM CURRENT USE OF MEDICATION: ICD-10-CM

## 2021-03-24 NOTE — TELEPHONE ENCOUNTER
Francisco Jordan MD Harris, Kathleen, RN             She needs labs every 3 months, next in May. Please put a PTH on with her next labs. Thanks      OUTCOME  Lab orders updated. PTH ordered.

## 2021-03-27 DIAGNOSIS — E87.70 HYPERVOLEMIA, UNSPECIFIED HYPERVOLEMIA TYPE: ICD-10-CM

## 2021-03-29 VITALS — WEIGHT: 196 LBS | HEIGHT: 61 IN | BODY MASS INDEX: 37 KG/M2

## 2021-03-29 ASSESSMENT — MIFFLIN-ST. JEOR: SCORE: 1381.43

## 2021-03-29 NOTE — PROGRESS NOTES
"Elizabeth Palma is a 65 year old female being evaluated via a billable telephone visit.     \"This telephone visit will be conducted via a call between you and your physician/provider. We have found that certain health care needs can be provided without the need for an in-person visit or physical exam.  This service lets us provide the care you need with a telephone conversation.  If a prescription is necessary we can send it directly to your pharmacy.  If lab work is needed we can place an order for that and you can then stop by our lab to have the test done at a later time.\"    Telephone visits are billed at different rates depending on your insurance coverage.  Please reach out to your insurance provider with any questions.    Patient has given verbal consent for  a Telephone visit? Yes    What telephone number would you like your provider to contact at at:  [phone]    How would you like to obtain your AVS? Mail a copy    Wendy Gorman CMA    Telephone Visit Details:     Telephone Visit Start Time: 1:06 PM    Telephone Visit End Time:1:30 PM  Malu LAROSE CMA, [facility], 3/29/2021 2:25 PM    SLEEP MEDICINE  TELEPHONE FOLLOW-UP VISIT  Sleep Psychology    Patient Name: Elizabeth Palma  MRN:  [id number]  Date of Service: Mar 30, 2021       Subjective Report     Elizabeth Palma  returns for a telephone visit to discuss progress in implementing behavioral strategies for the management of insomnia.  Patient consent for initiation of telephone visit was obtained and documented prior to initiation of visit.     Elizabeth reports she has overall been sleeping well at night.  Going to bed at 1030 PM and falls asleep easily.  Wakes up 1-2 times, gets a drink and able to fall back to sleep easily now.  Usually up by 7 AM.    Despite getting about 8 hours of sleep she also needs about 30 minutes of napping as well.     .     Sleep Data:     Source of Sleep Estimates:  verbal self-report    Average Sleep Latency: 10 " "min.  Times Awakened: 1-2  Average Wake Time After Sleep Onset: Less than 30 min.  Average Total Sleep Time: 8 hrs  Sleep Efficiency: Greater than 85%      EPWORTH SLEEPINESS SCALE    Sitting and reading 2   Watching TV 3   Sitting, inactive in a public place (theatre or mtg.) 0    As a passenger in a car 3   Lying down to rest in the afternoon when circumstance permit 3   Sitting and talking to someone 0   Sitting quietly after lunch without alcohol 3   In a car, while stopped for a few minutes in traffic 0   TOTAL SCORE (nl <11) 14     INSOMNIA SEVERITY INDEX     Difficulty falling asleep 0   Difficult staying asleep 3   Problems waking up to early 3   How SATISFIED/DISSATISFIED are you with your CURRENT sleep pattern? 3   How NOTICEABLE to others do you think your sleep pattern is in terms of your quality of life? 4   How WORRIED/DISTRESSED are you about your current sleep pattern? 2   To what extent do you consider your sleep problem to INTERFERE with your daily fuctioning(e.g. daytime fatigue, mood, ability to function at work/daily chores, concentration, mood,etc.) CURRENTLY? 2   INSOMNIA SEVERITY INDEX TOTAL SCORE 17    Absence of insomnia (0-7); sub-threshold insomnia (8-14); moderate insomnia (15-21); and severe insomnia (22-28)       Interventions     Strategies and recommendations including maintenance of stimulus control and Management of daytime sleepiness that occurs during the circadian dip. were discussed today.       Vital Signs     Ht 1.549 m (5' 1\")   Wt 88.9 kg (196 lb)   LMP  (LMP Unknown)   BMI 37.03 kg/m       Mental Status   Orientation:  X3  Mood:  normal  Speech/Language:  Normal  Thought Process: Intact  Associations:  Normal  Thought Content: Normal  Patient does not report any suicidal ideation, intention or plan.      Diagnostic Impressions and Plan     Chronic insomnia  Although patient continues to report moderate symptoms of insomnia, her self-report is sleep parameters within " normal limits with sleep latency, wake after sleep onset, total sleep time and sleep efficiency within normal.  Plan:  continue current sleep schedule and plan and Introduce early afternoon 30-45-minute nap to promote increased alertness in the setting of adequate nocturnal total sleep time and negative sleep study completed 7 years.    Follow-up: 3 months      Juan Quintanilla PsyD, DERRICK, DBSM  Diplomate, Behavioral Sleep Medicine  [facility]    Note: This dictation was created using voice recognition software. This document may contain an error not identified before finalizing the document. If the error changes the accuracy of the document, I would appreciate it being brought to my attention.

## 2021-03-29 NOTE — PATIENT INSTRUCTIONS
Your BMI is Body mass index is 37.03 kg/m .  Weight management is a personal decision.  If you are interested in exploring weight loss strategies, the following discussion covers the approaches that may be successful. Body mass index (BMI) is one way to tell whether you are at a healthy weight, overweight, or obese. It measures your weight in relation to your height.  A BMI of 18.5 to 24.9 is in the healthy range. A person with a BMI of 25 to 29.9 is considered overweight, and someone with a BMI of 30 or greater is considered obese. More than two-thirds of American adults are considered overweight or obese.  Being overweight or obese increases the risk for further weight gain. Excess weight may lead to heart disease and diabetes.  Creating and following plans for healthy eating and physical activity may help you improve your health.  Weight control is part of healthy lifestyle and includes exercise, emotional health, and healthy eating habits. Careful eating habits lifelong are the mainstay of weight control. Though there are significant health benefits from weight loss, long-term weight loss with diet alone may be very difficult to achieve- studies show long-term success with dietary management in less than 10% of people. Attaining a healthy weight may be especially difficult to achieve in those with severe obesity. In some cases, medications, devices and surgical management might be considered.  What can you do?  If you are overweight or obese and are interested in methods for weight loss, you should discuss this with your provider.     Consider reducing daily calorie intake by 500 calories.     Keep a food journal.     Avoiding skipping meals, consider cutting portions instead.    Diet combined with exercise helps maintain muscle while optimizing fat loss. Strength training is particularly important for building and maintaining muscle mass. Exercise helps reduce stress, increase energy, and improves fitness.  Increasing exercise without diet control, however, may not burn enough calories to loose weight.       Start walking three days a week 10-20 minutes at a time    Work towards walking thirty minutes five days a week     Eventually, increase the speed of your walking for 1-2 minutes at time    In addition, we recommend that you review healthy lifestyles and methods for weight loss available through the National Institutes of Health patient information sites:  http://win.niddk.nih.gov/publications/index.htm    And look into health and wellness programs that may be available through your health insurance provider, employer, local community center, or [employer].    Weight management plan: Discussed healthy diet and exercise guidelines

## 2021-03-30 ENCOUNTER — MYC MEDICAL ADVICE (OUTPATIENT)
Dept: NEUROLOGY | Facility: CLINIC | Age: 66
End: 2021-03-30

## 2021-03-30 ENCOUNTER — VIRTUAL VISIT (OUTPATIENT)
Dept: SLEEP MEDICINE | Facility: CLINIC | Age: 66
End: 2021-03-30
Payer: MEDICARE

## 2021-03-30 DIAGNOSIS — F51.04 CHRONIC INSOMNIA: Primary | ICD-10-CM

## 2021-03-30 PROCEDURE — 90832 PSYTX W PT 30 MINUTES: CPT | Mod: 95 | Performed by: PSYCHOLOGIST

## 2021-03-31 RX ORDER — METOLAZONE 5 MG/1
TABLET ORAL
Qty: 90 TABLET | Refills: 0 | Status: SHIPPED | OUTPATIENT
Start: 2021-03-31 | End: 2021-11-08

## 2021-03-31 NOTE — TELEPHONE ENCOUNTER
"METOLAZONE 5MG TABLETS      Last Written Prescription Date:  12-1-20  Last Fill Quantity: 30,   # refills: 3  Last Office Visit : 1-25-21  Future Office visit:  none    Last clinic note:   Plan - stop metolazone, start daily Senna and Miralax, increase water intake.    She will let me know how things are going for her.      Routing refill request to provider for review/approval because:  See last clinic note, ? RF,                                    (? Per pharm \"pt request 90 Day.\")  FYI: BP above protocol parameter- in last clinic note      "

## 2021-04-02 ENCOUNTER — THERAPY VISIT (OUTPATIENT)
Dept: PHYSICAL THERAPY | Facility: CLINIC | Age: 66
End: 2021-04-02
Payer: MEDICARE

## 2021-04-02 DIAGNOSIS — G89.29 CHRONIC PAIN OF BOTH KNEES: ICD-10-CM

## 2021-04-02 DIAGNOSIS — M25.561 CHRONIC PAIN OF BOTH KNEES: ICD-10-CM

## 2021-04-02 DIAGNOSIS — M25.562 CHRONIC PAIN OF BOTH KNEES: ICD-10-CM

## 2021-04-02 PROCEDURE — 97530 THERAPEUTIC ACTIVITIES: CPT | Mod: GP | Performed by: PHYSICAL THERAPIST

## 2021-04-02 PROCEDURE — 97110 THERAPEUTIC EXERCISES: CPT | Mod: GP | Performed by: PHYSICAL THERAPIST

## 2021-04-04 DIAGNOSIS — E66.01 MORBID OBESITY DUE TO EXCESS CALORIES (H): ICD-10-CM

## 2021-04-05 NOTE — PROGRESS NOTES
AVS has been mailed to patients home address April 5, 2021    Patient is scheduled 5/27/21 for 3 month follow up visit.      Malu LAROSE CMA Sleep Medicine

## 2021-04-07 RX ORDER — TOPIRAMATE 200 MG/1
TABLET, FILM COATED ORAL
Qty: 180 TABLET | Refills: 1 | Status: SHIPPED | OUTPATIENT
Start: 2021-04-07 | End: 2021-11-02

## 2021-04-07 NOTE — TELEPHONE ENCOUNTER
Patient was seen by Dr. Irene on 3/1/21. Refill not sent at that time. Sending refill to pharmacy.

## 2021-04-07 NOTE — TELEPHONE ENCOUNTER
TOPIRAMATE 200MG TABLETS  Take 1 tablet (200 mg) by mouth 2 times daily   Last Written Prescription Date:  4/16/20  Last Fill Quantity: 180,   # refills: 3  Last Office Visit : 3/1/21  Future Office visit:  12 weeks, not yet made    Routing refill request to provider for review/approval because:  Last visit 3/1/21 topiramate not refilled- last year 4/16/20  given 1 year supply ( #180/3 RF)

## 2021-04-08 DIAGNOSIS — K21.9 GASTROESOPHAGEAL REFLUX DISEASE WITHOUT ESOPHAGITIS: ICD-10-CM

## 2021-04-09 NOTE — TELEPHONE ENCOUNTER
omeprazole (PRILOSEC) 40 MG DR capsule      Last Written Prescription Date:  4-28-20  Last Fill Quantity: 90,   # refills: 2  Last Office Visit : 1-25-21  Future Office visit:  none    Routing refill request to provider for review/approval because:  Failed protocol, hx of osteoporosis

## 2021-04-12 RX ORDER — OMEPRAZOLE 40 MG/1
40 CAPSULE, DELAYED RELEASE ORAL DAILY
Qty: 90 CAPSULE | Refills: 2 | Status: SHIPPED | OUTPATIENT
Start: 2021-04-12 | End: 2021-10-14

## 2021-04-13 ENCOUNTER — VIRTUAL VISIT (OUTPATIENT)
Dept: ENDOCRINOLOGY | Facility: CLINIC | Age: 66
End: 2021-04-13
Payer: MEDICARE

## 2021-04-13 DIAGNOSIS — Z98.84 STATUS POST BARIATRIC SURGERY: Primary | ICD-10-CM

## 2021-04-13 DIAGNOSIS — E66.09 CLASS 1 OBESITY DUE TO EXCESS CALORIES WITHOUT SERIOUS COMORBIDITY IN ADULT, UNSPECIFIED BMI: ICD-10-CM

## 2021-04-13 DIAGNOSIS — E11.42 TYPE 2 DIABETES MELLITUS WITH PERIPHERAL NEUROPATHY (H): ICD-10-CM

## 2021-04-13 DIAGNOSIS — Z71.3 NUTRITIONAL COUNSELING: ICD-10-CM

## 2021-04-13 DIAGNOSIS — E66.811 CLASS 1 OBESITY DUE TO EXCESS CALORIES WITHOUT SERIOUS COMORBIDITY IN ADULT, UNSPECIFIED BMI: ICD-10-CM

## 2021-04-13 PROCEDURE — 97803 MED NUTRITION INDIV SUBSEQ: CPT | Mod: GA | Performed by: DIETITIAN, REGISTERED

## 2021-04-13 NOTE — PROGRESS NOTES
"Elizabeth Palma is a 65 year old who is being evaluated via a billable telephone visit.     The patient has been notified of following:     \"This telephone visit will be conducted via a call between you and your physician/provider. We have found that certain health care needs can be provided without the need for a physical exam.  This service lets us provide the care you need with a short phone conversation.  If a prescription is necessary we can send it directly to your pharmacy.  If lab work is needed we can place an order for that and you can then stop by our lab to have the test done at a later time.    Telephone visits are billed at different rates depending on your insurance coverage. During this emergency period, for some insurers they may be billed the same as an in-person visit.  Please reach out to your insurance provider with any questions.    If during the course of the call the physician/provider feels a telephone visit is not appropriate, you will not be charged for this service.\"    Patient has given verbal consent for Telephone visit?  Yes    What phone number would you like to be contacted at? [phone]    How would you like to obtain your AVS? Mychart    Phone call duration: 21 minutes    During this virtual visit the patient is located in [place], patient verifies this as the location during the entirety of this visit.     * did not reach patient on first attempt for phone visit, reached patient on second attempt on visit     Nutrition Reassessment  Reason For Visit:  Elizabeth Palma is a 64 year-old female presenting today for nutrition follow-up, s/p \"gastric bypass in 1990 at [facility]\" per Pt report. She is seeing medical weight management.  She was referred by Dr. Irene.  Note: Pt had a kidney transplant on March 20, 2014.      RD explained ABN verbally to patient over virtual visit pt verbally accepted the ABN form Medicare ABN form which is good for 1 year. SUJATA sent message to [facility] " [facility] to mail out ABN per protocol. Pt to send ABN back to clinic for scanning and filing.      Patient with Co-morbidities of obesity including:  Type II DM yes  Renal Failure no  Sleep apnea yes  Hypertension no   Dyslipidemia yes  Joint pain no  Back pain no  GERD yes     Anthropometrics:  Height: 61\"  Pre-op Weight: 265 lbs per Pt report  Home weight: 200 lb ( per pt's report)     Current Vitamins/Minerals: 3000 International Units vitamin D/day, Calcium citrate chewy 500 mg with vitamin 500 mg 1x daily, vitamin C, vitamin B12 every other day per PCP, iron  - pt is no longer taking a multivitamin and reports she was told to only take calcium 1x daily.      Nutrition History:  Per previous RD visits:  - Lactose intolerance ever since bariatric surgery.  - Pt stated that she has osteoporosis; however, she stated that her doctors don't want her to take any vitamins or minerals due to her kidney transplant.  - Pt states she does not like the taste of protein drinks (shakes and clear liquids), yogurt, beans/lentils or any of the high protein products we have in clinic. Pt reports that she does not like meat, and does not eat a lot of it.   - Pt and  follow a Kosher diet   - Pt also stated on previous visits that she will not record food intake due to the fact that every time she does this, she simply does not eat as she doesn't want to record.  She stated that her therapist strongly advises against recording food intake for this reason.   - Cannot eat bone broth because it is not kosher.      4/13/2021  - Reports she has been gaining weight. Her brother recently passed away   Brother past away people bring food, lots of pasta with cheese dishes.   - Pt reports she did a food record to Dr. Irene and he said it looked good, unable to find record in Chart.   - Continues to have nausea in the morning, reports she is not hungry in the morning or during the day and does not eat, then reports she will have a " salad in the morning, which sits well in her stomach.   Diet Recall:  B: Lettuce, vegetabel and 2 tbsp of light dressing, sunflower  1: pm: pasta dish, a lot of cut up fruit.   D: Tofu, pasta dish with cheese   Beverages: 1 cup of coffee with almond milk and water   - She does not like protein shake and will not try them again.      - She is trying to walk as much as possible, going to PT for conditions reports getting fatigues easily.     Progress Towards Previous Goals:  Goals:   1. Eat 3 meals with lean protein at each meal, along with a non-starchy vegetable or whole fruit, up to 1/2 c carb at a meal. Plan every morning what you plan to eat for breakfast, lunch, and dinner for that day.-No Change    Breakfast: [employer] shake + fruit   Lunch: protein + fruit or vegetable   Dinner: protein + fruit or vegetable  2. If needing a snack, have vegetables or protein snack. Limit 3x/day. -Not snacking   3.  Walk/ use 60 up machine 15 min daily, increasing as able-Continues, in physical therapy for conditioning  4. Continue working on Meal planning for all meals. Consider buying a non-fat cottage cheese, and/or eggs instead of bagel/grits/toast for breakfast. - Continues    5. Drink 48-64 ounces of water/fluids a day.-Met    Nutrition Prescription:   Grams Protein: 60 (minimum) - Not meeting  Amount of Fluid: 48-64 oz    Nutrition Diagnosis  Current: Overweight related to history of excessive energy intake, variable eating habits/intake  as evidenced by pt report and BMI > 25. - Continues    Intervention  1. Reviewed and modified goals   2. Discussed concern that she is not eating enough throughout the day, which can be contributing to her weakness and her lack of weight loss.. Recommended eating 3 meals daily with a protein source at every meal. Will perform NFPE next time pt is in person RD visit.   3. Reviewed high protein foods, and encouraged a higher protein food with breakfast, lunch and dinner   4. Reviewed  vitamin and mineral recommendations.  5. AVS via Flaziot     Patient Understanding: good  Expected Compliance: good    Goals:   1. Eat 3 meals with lean protein at each meal, along with a non-starchy vegetable or whole fruit, up to 1/2 c carb at a meal.    Breakfast: Salad with hard boiled egg    Lunch: Cottage cheese + fruit or vegetable   Dinner: fish, tuna, salmon, tofu, vegan crumbles+ fruit or vegetable  2. If needing a snack, have vegetables or protein snack. Limit 3x/day.    3. Continue physical therapy and ask for exercises to do at home.  4. Have a list of high protein food on the refrigerator to help remind you to grab a protein while eating.  5. Drink 48-64 ounces of water/fluids a day.      Protein Sources for Weight Loss  [email or web address]       Follow-Up: 3 months     Phone call contact time:   Call Started at: 12:49 PM  Call Ended at: 1:10 PM    Time spent with patient: 21 minutes  Carrie Chawla, MS, RD, LD

## 2021-04-13 NOTE — PATIENT INSTRUCTIONS
Goals:   1. Eat 3 meals with lean protein at each meal, along with a non-starchy vegetable or whole fruit, up to 1/2 c carb at a meal.    Breakfast: Salad with hard boiled egg    Lunch: Cottage cheese + fruit or vegetable   Dinner: fish, tuna, salmon, tofu, vegan crumbles+ fruit or vegetable  2. If needing a snack, have vegetables or protein snack. Limit 3x/day.    3. Continue physical therapy and ask for exercises to do at home.  4. Have a list of high protein food on the refrigerator to help remind you to grab a protein while eating.  5. Drink 48-64 ounces of water/fluids a day.      Protein Sources for Weight Loss  [email or web address]

## 2021-04-13 NOTE — LETTER
"4/13/2021       RE: Elizabeth Palma  [address]  [address]  [place] [address]     Dear Colleague,    Thank you for referring your patient, Elizabeth Palma, to the [facility] [place] [facility]. Please see a copy of my visit note below.    Elizabeth Palma is a 65 year old who is being evaluated via a billable telephone visit.     The patient has been notified of following:     \"This telephone visit will be conducted via a call between you and your physician/provider. We have found that certain health care needs can be provided without the need for a physical exam.  This service lets us provide the care you need with a short phone conversation.  If a prescription is necessary we can send it directly to your pharmacy.  If lab work is needed we can place an order for that and you can then stop by our lab to have the test done at a later time.    Telephone visits are billed at different rates depending on your insurance coverage. During this emergency period, for some insurers they may be billed the same as an in-person visit.  Please reach out to your insurance provider with any questions.    If during the course of the call the physician/provider feels a telephone visit is not appropriate, you will not be charged for this service.\"    Patient has given verbal consent for Telephone visit?  Yes    What phone number would you like to be contacted at? [phone]    How would you like to obtain your AVS? Symonicst    Phone call duration: 21 minutes    During this virtual visit the patient is located in [place], patient verifies this as the location during the entirety of this visit.     * did not reach patient on first attempt for phone visit, reached patient on second attempt on visit     Nutrition Reassessment  Reason For Visit:  Elizabeth Palma is a 64 year-old female presenting today for nutrition follow-up, s/p \"gastric bypass " "in 1990 at [facility]\" per Pt report. She is seeing medical weight management.  She was referred by Dr. Irene.  Note: Pt had a kidney transplant on March 20, 2014.      RD explained ABN verbally to patient over virtual visit pt verbally accepted the ABN form Medicare ABN form which is good for 1 year. RD sent message to P [facility] to mail out ABN per protocol. Pt to send ABN back to clinic for scanning and filing.      Patient with Co-morbidities of obesity including:  Type II DM yes  Renal Failure no  Sleep apnea yes  Hypertension no   Dyslipidemia yes  Joint pain no  Back pain no  GERD yes     Anthropometrics:  Height: 61\"  Pre-op Weight: 265 lbs per Pt report  Home weight: 200 lb ( per pt's report)     Current Vitamins/Minerals: 3000 International Units vitamin D/day, Calcium citrate chewy 500 mg with vitamin 500 mg 1x daily, vitamin C, vitamin B12 every other day per PCP, iron  - pt is no longer taking a multivitamin and reports she was told to only take calcium 1x daily.      Nutrition History:  Per previous RD visits:  - Lactose intolerance ever since bariatric surgery.  - Pt stated that she has osteoporosis; however, she stated that her doctors don't want her to take any vitamins or minerals due to her kidney transplant.  - Pt states she does not like the taste of protein drinks (shakes and clear liquids), yogurt, beans/lentils or any of the high protein products we have in clinic. Pt reports that she does not like meat, and does not eat a lot of it.   - Pt and  follow a Kosher diet   - Pt also stated on previous visits that she will not record food intake due to the fact that every time she does this, she simply does not eat as she doesn't want to record.  She stated that her therapist strongly advises against recording food intake for this reason.   - Cannot eat bone broth because it is not kosher.      4/13/2021  - Reports she has been gaining weight. Her brother recently passed away "   Brother past away people bring food, lots of pasta with cheese dishes.   - Pt reports she did a food record to Dr. Irene and he said it looked good, unable to find record in Chart.   - Continues to have nausea in the morning, reports she is not hungry in the morning or during the day and does not eat, then reports she will have a salad in the morning, which sits well in her stomach.   Diet Recall:  B: Lettuce, vegetabel and 2 tbsp of light dressing, sunflower  1: pm: pasta dish, a lot of cut up fruit.   D: Tofu, pasta dish with cheese   Beverages: 1 cup of coffee with almond milk and water   - She does not like protein shake and will not try them again.      - She is trying to walk as much as possible, going to PT for conditions reports getting fatigues easily.     Progress Towards Previous Goals:  Goals:   1. Eat 3 meals with lean protein at each meal, along with a non-starchy vegetable or whole fruit, up to 1/2 c carb at a meal. Plan every morning what you plan to eat for breakfast, lunch, and dinner for that day.-No Change    Breakfast: Her[employer] shake + fruit   Lunch: protein + fruit or vegetable   Dinner: protein + fruit or vegetable  2. If needing a snack, have vegetables or protein snack. Limit 3x/day. -Not snacking   3.  Walk/ use 60 up machine 15 min daily, increasing as able-Continues, in physical therapy for conditioning  4. Continue working on Meal planning for all meals. Consider buying a non-fat cottage cheese, and/or eggs instead of bagel/grits/toast for breakfast. - Continues    5. Drink 48-64 ounces of water/fluids a day.-Met    Nutrition Prescription:   Grams Protein: 60 (minimum) - Not meeting  Amount of Fluid: 48-64 oz    Nutrition Diagnosis  Current: Overweight related to history of excessive energy intake, variable eating habits/intake  as evidenced by pt report and BMI > 25. - Continues    Intervention  1. Reviewed and modified goals   2. Discussed concern that she is not eating enough  throughout the day, which can be contributing to her weakness and her lack of weight loss.. Recommended eating 3 meals daily with a protein source at every meal. Will perform NFPE next time pt is in person RD visit.   3. Reviewed high protein foods, and encouraged a higher protein food with breakfast, lunch and dinner   4. Reviewed vitamin and mineral recommendations.  5. AVS via [employer] Mobile[employer]     Patient Understanding: good  Expected Compliance: good    Goals:   1. Eat 3 meals with lean protein at each meal, along with a non-starchy vegetable or whole fruit, up to 1/2 c carb at a meal.    Breakfast: Salad with hard boiled egg    Lunch: Cottage cheese + fruit or vegetable   Dinner: fish, tuna, salmon, tofu, vegan crumbles+ fruit or vegetable  2. If needing a snack, have vegetables or protein snack. Limit 3x/day.    3. Continue physical therapy and ask for exercises to do at home.  4. Have a list of high protein food on the refrigerator to help remind you to grab a protein while eating.  5. Drink 48-64 ounces of water/fluids a day.      Protein Sources for Weight Loss  [email or web address]       Follow-Up: 3 months     Phone call contact time:   Call Started at: 12:49 PM  Call Ended at: 1:10 PM    Time spent with patient: 21 minutes  Carrie Chawla MS, RD, LD

## 2021-04-19 DIAGNOSIS — Z94.0 KIDNEY REPLACED BY TRANSPLANT: Primary | ICD-10-CM

## 2021-04-19 RX ORDER — CYCLOSPORINE 25 MG/1
125 CAPSULE, LIQUID FILLED ORAL 2 TIMES DAILY
Qty: 300 CAPSULE | Refills: 11 | Status: SHIPPED | OUTPATIENT
Start: 2021-04-19 | End: 2021-04-19

## 2021-04-19 RX ORDER — CYCLOSPORINE 25 MG/1
125 CAPSULE, LIQUID FILLED ORAL 2 TIMES DAILY
Qty: 300 CAPSULE | Refills: 11 | Status: SHIPPED | OUTPATIENT
Start: 2021-04-19 | End: 2022-05-02

## 2021-04-23 ENCOUNTER — THERAPY VISIT (OUTPATIENT)
Dept: PHYSICAL THERAPY | Facility: CLINIC | Age: 66
End: 2021-04-23
Payer: MEDICARE

## 2021-04-23 DIAGNOSIS — M25.561 CHRONIC PAIN OF BOTH KNEES: ICD-10-CM

## 2021-04-23 DIAGNOSIS — G89.29 CHRONIC PAIN OF BOTH KNEES: ICD-10-CM

## 2021-04-23 DIAGNOSIS — M25.562 CHRONIC PAIN OF BOTH KNEES: ICD-10-CM

## 2021-04-23 PROCEDURE — 97110 THERAPEUTIC EXERCISES: CPT | Mod: GP | Performed by: PHYSICAL THERAPIST

## 2021-04-23 PROCEDURE — 97530 THERAPEUTIC ACTIVITIES: CPT | Mod: GP | Performed by: PHYSICAL THERAPIST

## 2021-04-23 NOTE — LETTER
DEPARTMENT OF HEALTH AND HUMAN SERVICES  CENTERS FOR MEDICARE & MEDICAID SERVICES    PLAN/UPDATED PLAN OF PROGRESS FOR OUTPATIENT REHABILITATION      PATIENTS NAME:  Elizabeth Palma   : 1955    PROVIDER NUMBER:    [id number]    [id number]     PROVIDER NAME: [facility]    MEDICAL RECORD NUMBER: [id number]     START OF CARE DATE:  SOC Date: 01/15/21   TYPE:  PT    PRIMARY/TREATMENT DIAGNOSIS: (Pertinent Medical Diagnosis)  Chronic pain of both knees    VISITS FROM START OF CARE:  Rxs Used: 10     PROGRESS  REPORT  Progress reporting period is from 1/15/2021 to 2021.   Elizabeth has been seen in PT 10 times for bilateral knee pain.  Treatment has included manual therapy, knee strengthening and ambulation on the Alter G anti gravity treadmill.    SUBJECTIVE  Subjective: increased right lateral knee pain.  Trying to walk more    Current Pain level: 4/10.     Initial Pain level: 8/10.   Changes in function:  See goal flow sheet  Adverse reaction to treatment or activity: None    OBJECTIVE  Changes noted in objective findings:    Objective: unable to do > 10 SLR either leg due to fatigue,  Can do 10 sit to stand today but last 2 shakey.  Amb without AD with minimally antalgic gait.       ASSESSMENT/PLAN  Updated problem list and treatment plan: Diagnosis 1:  Bilateral knee pain  Pain -  self management, education and home program  Decreased strength - therapeutic exercise and therapeutic activities  Impaired gait - gait training  Decreased function - therapeutic activities  STG/LTGs have been met or progress has been made towards goals:  See goal flow sheet  Assessment of Progress: The patient's condition is improving.  Self Management Plans:  Patient has been instructed in a home treatment program.  I have re-evaluated this patient and find that the nature, scope, duration and intensity of the therapy is appropriate for the medical condition of the patient.  Elizabeth  "continues to require the following intervention to meet STG and LTG's:  PT    PATIENTS NAME:  Elizabeth Palma   : 1955    Recommendations:  This patient would benefit from continued therapy.     Frequency:  1 X week, once daily  Duration:  for 4 weeks      Caregiver Signature/Credentials _____________________________ Date ________       Treating Provider: Rosa Azevedo PT [facility]   I have reviewed and certified the need for these services and plan of treatment while under my care.      PHYSICIAN'S SIGNATURE:   ___________________________________  Date___________                       Javier Tuttle MD    Certification period:  Beginning of Cert date period: 04/15/21 to  End of Cert period date: 21     Functional Level Progress Report: Please see attached \"Goal Flow sheet for Functional level.\"    ____X____ Continue Services or________ DC Services                Service dates: From  SOC Date: 01/15/21 date to present                         "

## 2021-04-23 NOTE — LETTER
[facility] [address]  [facility] [address]  [phone]    May 10, 2021    Re: Elizabeth Palma   :   1955  MRN:  [id number]   REFERRING PHYSICIAN:   Javier HUGGINS [facility]    Date of Initial Evaluation:  1-15-21  Visits:  Rxs Used: 10  Reason for Referral:  Chronic pain of both knees    PROGRESS  REPORT  Progress reporting period is from 1/15/2021 to 2021.   Elizabeth has been seen in PT 10 times for bilateral knee pain.  Treatment has included manual therapy, knee strengthening and ambulation on the Alter G anti gravity treadmill.    SUBJECTIVE Subjective: increased right lateral knee pain.  Trying to walk more   Current Pain level: 4/10.    Initial Pain level: 8/10.   Changes in function:  See goal flow sheet  Adverse reaction to treatment or activity: None    OBJECTIVE  Changes noted in objective findings:    Objective: unable to do > 10 SLR either leg due to fatigue,  Can do 10 sit to stand today but last 2 shakey.  Amb without AD with minimally antalgic gait.       ASSESSMENT/PLAN  Updated problem list and treatment plan: Diagnosis 1:  Bilateral knee pain  Pain -  self management, education and home program  Decreased strength - therapeutic exercise and therapeutic activities  Impaired gait - gait training  Decreased function - therapeutic activities  STG/LTGs have been met or progress has been made towards goals:  See goal flow sheet  Assessment of Progress: The patient's condition is improving.  Self Management Plans:  Patient has been instructed in a home treatment program.  I have re-evaluated this patient and find that the nature, scope, duration and intensity of the therapy is appropriate for the medical condition of the patient.  Elizabeth continues to require the following intervention to meet STG and LTG's:  PT                  Re: Elizabeth Palma   :   1955    Recommendations:  This  patient would benefit from continued therapy.     Frequency:  1 X week, once daily  Duration:  for 4 weeks    Thank you for your referral.    INQUIRIES  Therapist: Rosa Azevedo PT, [address]  [facility] [address]  Phone: [phone]  Fax: [phone]

## 2021-04-27 ENCOUNTER — DOCUMENTATION ONLY (OUTPATIENT)
Dept: ENDOCRINOLOGY | Facility: CLINIC | Age: 66
End: 2021-04-27

## 2021-05-04 ENCOUNTER — OFFICE VISIT (OUTPATIENT)
Dept: PODIATRY | Facility: CLINIC | Age: 66
End: 2021-05-04
Payer: MEDICARE

## 2021-05-04 VITALS — OXYGEN SATURATION: 97 % | DIASTOLIC BLOOD PRESSURE: 79 MMHG | SYSTOLIC BLOOD PRESSURE: 120 MMHG | HEART RATE: 70 BPM

## 2021-05-04 DIAGNOSIS — L60.2 ONYCHAUXIS: ICD-10-CM

## 2021-05-04 DIAGNOSIS — E11.49 TYPE II OR UNSPECIFIED TYPE DIABETES MELLITUS WITH NEUROLOGICAL MANIFESTATIONS, NOT STATED AS UNCONTROLLED(250.60) (H): Primary | ICD-10-CM

## 2021-05-04 PROCEDURE — 99214 OFFICE O/P EST MOD 30 MIN: CPT | Performed by: PODIATRIST

## 2021-05-04 NOTE — PATIENT INSTRUCTIONS
Thanks for coming today.  [facility]  [address] [place] [address]    To schedule future appointments in Ortho Clinic, you may call [phone].    To schedule ordered imaging by your provider:   Call Central Imaging [phone]    To schedule an injection ordered by your provider:  Call Central [facility] Injection scheduling line: [phone]  [email or web address] available online at:  [email or web address]    Please call if any further questions or concerns ([phone]).  Clinic hours 8 am to 5 pm.    Return to clinic (call) if symptoms worsen or fail to improve.

## 2021-05-04 NOTE — LETTER
2021         RE: Elizabeth Palma  [address]  [address]  [facility] [address]        Dear Colleague,    Thank you for referring your patient, Elizabeth Palma, to the [facility]. Please see a copy of my visit note below.    Past Medical History:   Diagnosis Date     Abnormal MRI, cervical spine 10/15/2011    2011; mild changes noted. Study done for left arm symptoms Impression:  1. Mild multilevel degenerative disc disease with no significant canal or neural stenosis seen. motion artifact on the STIR images in these are not interpretable. The remaining images were interpreted      Autosomal dominant polycystic kidney disease 2011     (Problem list name updated by automated process. Provider to review and confirm.)     CMC DJD(carpometacarpal degenerative joint disease), localized primary 3/5/2013     -donor kidney transplant 3/20/2014     Depressive disorder 11/15/2012     DM type 2 (diabetes mellitus, type 2) (H) 2013     Encounter for long-term (current) use of other medications 2015     Family history of tremor 10/17/2011     Gastroesophageal reflux disease      Generalized anxiety disorder 11/15/2012     Glaucoma      Hyperlipidemia 10/15/2011     Hyperparathyroidism, secondary (H) 2015     Hypertension     resolved     Immunosuppressed status (H) 3/20/2014     Major depressive disorder, recurrent episode, moderate (H) 11/15/2012     Obesity (BMI 30-39.9)      OP (osteoporosis) T score -3.8 2009 T-score -3.7      LION (obstructive sleep apnoea) 10/15/2012    intol to cpa     Pain in joint, forearm -- L unhealed Fx 2013     Premature menopause age 37 7/10/2012    OCP (vaginal bldg)-->HT which she stopped 2 mo later documented at 2007 visit (age 51).      Restless leg syndrome      Rib fractures 2013     Sensory loss 10/17/2011    Bottom of feet; uncertain if there is a neuropathy per notes.       Stiffness of  joint, not elsewhere classified, hand 3/5/2013     Tremor 10/15/2011    head     Uncomplicated asthma      Patient Active Problem List   Diagnosis     Hypertension     Hyperlipidemia     Abnormal MRI, cervical spine     Sensory loss     Premature menopause age 37     OP (osteoporosis) T score -3.8     Major depressive disorder, recurrent episode, moderate (H)     Generalized anxiety disorder     CMC DJD(carpometacarpal degenerative joint disease), localized primary     Pain in joint, forearm -- L unhealed Fx     -donor kidney transplant     Immunosuppressed status (H)     Hyperparathyroidism, secondary (H)     Hyperparathyroidism (H)     Senile osteoporosis     Pain in joint involving ankle and foot     Nightmares associated with chronic post-traumatic stress disorder     Type 2 diabetes mellitus with peripheral neuropathy (H)     Posttraumatic stress disorder     Plantar fasciitis, bilateral     Right knee pain     Bilateral knee pain     Age-related osteoporosis without current pathological fracture     Narcissistic personality disorder (H)     Personal history of other drug therapy     Median sensory neuropathy, left     Marital conflict     Suicidal ideation     Autosomal dominant polycystic kidney disease     Secondary hyperparathyroidism (H)     Anemia, iron deficiency     Morbid obesity (H)     Chronic kidney disease, stage 3     Past Surgical History:   Procedure Laterality Date     ABDOMEN SURGERY       ANKLE SURGERY       C TRANSPLANTATION OF KIDNEY  2014     C/SECTION, LOW TRANSVERSE      x 2     CHOLECYSTECTOMY       COLONOSCOPY       ESOPHAGOSCOPY, GASTROSCOPY, DUODENOSCOPY (EGD), COMBINED N/A 2015    Procedure: COMBINED ESOPHAGOSCOPY, GASTROSCOPY, DUODENOSCOPY (EGD);  Surgeon: Sky Davey MD;  Location:  GI     ESOPHAGOSCOPY, GASTROSCOPY, DUODENOSCOPY (EGD), COMBINED N/A 2015    Procedure: COMBINED ESOPHAGOSCOPY, GASTROSCOPY, DUODENOSCOPY (EGD), BIOPSY SINGLE OR  MULTIPLE;  Surgeon: Sky Davey MD;  Location:  GI     EYE SURGERY       LAPAROSCOPY, SURGICAL; REPAIR INCISIONAL OR VENTRAL HERNIA       LASER SURGERY OF EYE Left 10/01/2020    sever vitreous strands     ORTHOPEDIC SURGERY       HERMINIA EN Y BOWEL  1990     WRIST SURGERY       Social History     Socioeconomic History     Marital status:      Spouse name: Rahat     Number of children: 2     Years of education: Not on file     Highest education level: Not on file   Occupational History     Comment: part-time   Social Needs     Financial resource strain: Not on file     Food insecurity     Worry: Not on file     Inability: Not on file     Transportation needs     Medical: Not on file     Non-medical: Not on file   Tobacco Use     Smoking status: Former Smoker     Smokeless tobacco: Never Used   Substance and Sexual Activity     Alcohol use: No     Alcohol/week: 0.0 standard drinks     Drug use: No     Sexual activity: Yes     Partners: Male     Birth control/protection: None     Comment: 1 partner   Lifestyle     Physical activity     Days per week: Not on file     Minutes per session: Not on file     Stress: Not on file   Relationships     Social connections     Talks on phone: Not on file     Gets together: Not on file     Attends Taoist service: Not on file     Active member of club or organization: Not on file     Attends meetings of clubs or organizations: Not on file     Relationship status: Not on file     Intimate partner violence     Fear of current or ex partner: Not on file     Emotionally abused: Not on file     Physically abused: Not on file     Forced sexual activity: Not on file   Other Topics Concern     Parent/sibling w/ CABG, MI or angioplasty before 65F 55M? Not Asked   Social History Narrative     Not on file     Family History   Problem Relation Age of Onset     Mental Illness Other         family hx     Heart Disease Other      Diabetes Other      Cancer Other      Genetic  Disorder Father      Mental Illness Father      Diabetes Father      Hypertension Father      Hyperlipidemia Mother      Diabetes Mother      Hypertension Mother      Mental Illness Other      Diabetes Other      Glaucoma No family hx of      Macular Degeneration No family hx of      Lab Results   Component Value Date    A1C 7.3 01/25/2021    A1C 6.7 09/03/2020    A1C 7.1 12/17/2019    A1C 7.2 04/24/2019    A1C 7.4 11/27/2018     SUBJECTIVE FINDINGS:  This 65-year-old female returns to clinic for diabetic foot check and onychauxis, onychocryptosis. She relates she is getting pain in toes. Relates no ulcers or sores since we have seen her last, no injuries. She relates she is wearing diabetic shoes. She relates she does have some tingling in her feet.  She relates her callus on left Hallux resolved with Gold bond cream use.  Relates to using Nystatin cream.       OBJECTIVE FINDINGS:  DP and PT are 2/4 bilaterally.  She has dorsally contracted digits bilaterally 1-5. There is no erythema, no drainage, no odor, no calor bilaterally. She has incurvated nails 1-5 bilaterally to differing degrees with pain on palpation.  She has some thickening, dystrophy and subungual debris of toenails bilaterally.      ASSESSMENT AND PLAN: Onychauxis and Onychomycosis bilaterally causing pain. She is diabetic with peripheral neuropathy. Diagnosis and treatment discussed with her. All the nails were reduced or debrided bilaterally upon consent. Continue Nystatin cream given and use discussed with her.  She will return to clinic to see me in about 2-3 months.       Again, thank you for allowing me to participate in the care of your patient.        Sincerely,        Javier Tuttle DPM

## 2021-05-04 NOTE — NURSING NOTE
Elizabeth Palma's chief complaint for this visit includes:  Chief Complaint   Patient presents with     RECHECK     diabetic foot & toenail care     PCP: Horacio Sheridan    Referring Provider:  No referring provider defined for this encounter.    /79   Pulse 70   LMP  (LMP Unknown)   SpO2 97%   Data Unavailable     Do you need any medication refills at today's visit? No    Elma Wiseman CMA

## 2021-05-04 NOTE — PROGRESS NOTES
Past Medical History:   Diagnosis Date     Abnormal MRI, cervical spine 10/15/2011    2011; mild changes noted. Study done for left arm symptoms Impression:  1. Mild multilevel degenerative disc disease with no significant canal or neural stenosis seen. motion artifact on the STIR images in these are not interpretable. The remaining images were interpreted      Autosomal dominant polycystic kidney disease 2011     (Problem list name updated by automated process. Provider to review and confirm.)     CMC DJD(carpometacarpal degenerative joint disease), localized primary 3/5/2013     -donor kidney transplant 3/20/2014     Depressive disorder 11/15/2012     DM type 2 (diabetes mellitus, type 2) (H) 2013     Encounter for long-term (current) use of other medications 2015     Family history of tremor 10/17/2011     Gastroesophageal reflux disease      Generalized anxiety disorder 11/15/2012     Glaucoma      Hyperlipidemia 10/15/2011     Hyperparathyroidism, secondary (H) 2015     Hypertension     resolved     Immunosuppressed status (H) 3/20/2014     Major depressive disorder, recurrent episode, moderate (H) 11/15/2012     Obesity (BMI 30-39.9)      OP (osteoporosis) T score -3.8 2009 T-score -3.7      LION (obstructive sleep apnoea) 10/15/2012    intol to cpa     Pain in joint, forearm -- L unhealed Fx 2013     Premature menopause age 37 7/10/2012    OCP (vaginal bldg)-->HT which she stopped 2 mo later documented at 2007 visit (age 51).      Restless leg syndrome      Rib fractures 2013     Sensory loss 10/17/2011    Bottom of feet; uncertain if there is a neuropathy per notes.       Stiffness of joint, not elsewhere classified, hand 3/5/2013     Tremor 10/15/2011    head     Uncomplicated asthma      Patient Active Problem List   Diagnosis     Hypertension     Hyperlipidemia     Abnormal MRI, cervical spine     Sensory loss     Premature menopause age 37      OP (osteoporosis) T score -3.8     Major depressive disorder, recurrent episode, moderate (H)     Generalized anxiety disorder     CMC DJD(carpometacarpal degenerative joint disease), localized primary     Pain in joint, forearm -- L unhealed Fx     -donor kidney transplant     Immunosuppressed status (H)     Hyperparathyroidism, secondary (H)     Hyperparathyroidism (H)     Senile osteoporosis     Pain in joint involving ankle and foot     Nightmares associated with chronic post-traumatic stress disorder     Type 2 diabetes mellitus with peripheral neuropathy (H)     Posttraumatic stress disorder     Plantar fasciitis, bilateral     Right knee pain     Bilateral knee pain     Age-related osteoporosis without current pathological fracture     Narcissistic personality disorder (H)     Personal history of other drug therapy     Median sensory neuropathy, left     Marital conflict     Suicidal ideation     Autosomal dominant polycystic kidney disease     Secondary hyperparathyroidism (H)     Anemia, iron deficiency     Morbid obesity (H)     Chronic kidney disease, stage 3     Past Surgical History:   Procedure Laterality Date     ABDOMEN SURGERY       ANKLE SURGERY       C TRANSPLANTATION OF KIDNEY  2014     C/SECTION, LOW TRANSVERSE      x 2     CHOLECYSTECTOMY       COLONOSCOPY       ESOPHAGOSCOPY, GASTROSCOPY, DUODENOSCOPY (EGD), COMBINED N/A 2015    Procedure: COMBINED ESOPHAGOSCOPY, GASTROSCOPY, DUODENOSCOPY (EGD);  Surgeon: Sky Davey MD;  Location:  GI     ESOPHAGOSCOPY, GASTROSCOPY, DUODENOSCOPY (EGD), COMBINED N/A 2015    Procedure: COMBINED ESOPHAGOSCOPY, GASTROSCOPY, DUODENOSCOPY (EGD), BIOPSY SINGLE OR MULTIPLE;  Surgeon: Sky Davey MD;  Location:  GI     EYE SURGERY       LAPAROSCOPY, SURGICAL; REPAIR INCISIONAL OR VENTRAL HERNIA       LASER SURGERY OF EYE Left 10/01/2020    sever vitreous strands     ORTHOPEDIC SURGERY       HERMINIA EN Y BOWEL        WRIST SURGERY       Social History     Socioeconomic History     Marital status:      Spouse name: Rahat     Number of children: 2     Years of education: Not on file     Highest education level: Not on file   Occupational History     Comment: part-time   Social Needs     Financial resource strain: Not on file     Food insecurity     Worry: Not on file     Inability: Not on file     Transportation needs     Medical: Not on file     Non-medical: Not on file   Tobacco Use     Smoking status: Former Smoker     Smokeless tobacco: Never Used   Substance and Sexual Activity     Alcohol use: No     Alcohol/week: 0.0 standard drinks     Drug use: No     Sexual activity: Yes     Partners: Male     Birth control/protection: None     Comment: 1 partner   Lifestyle     Physical activity     Days per week: Not on file     Minutes per session: Not on file     Stress: Not on file   Relationships     Social connections     Talks on phone: Not on file     Gets together: Not on file     Attends Nondenominational service: Not on file     Active member of club or organization: Not on file     Attends meetings of clubs or organizations: Not on file     Relationship status: Not on file     Intimate partner violence     Fear of current or ex partner: Not on file     Emotionally abused: Not on file     Physically abused: Not on file     Forced sexual activity: Not on file   Other Topics Concern     Parent/sibling w/ CABG, MI or angioplasty before 65F 55M? Not Asked   Social History Narrative     Not on file     Family History   Problem Relation Age of Onset     Mental Illness Other         family hx     Heart Disease Other      Diabetes Other      Cancer Other      Genetic Disorder Father      Mental Illness Father      Diabetes Father      Hypertension Father      Hyperlipidemia Mother      Diabetes Mother      Hypertension Mother      Mental Illness Other      Diabetes Other      Glaucoma No family hx of      Macular Degeneration No  family hx of      Lab Results   Component Value Date    A1C 7.3 01/25/2021    A1C 6.7 09/03/2020    A1C 7.1 12/17/2019    A1C 7.2 04/24/2019    A1C 7.4 11/27/2018     SUBJECTIVE FINDINGS:  This 65-year-old female returns to clinic for diabetic foot check and onychauxis, onychocryptosis. She relates she is getting pain in toes. Relates no ulcers or sores since we have seen her last, no injuries. She relates she is wearing diabetic shoes. She relates she does have some tingling in her feet.  She relates her callus on left Hallux resolved with Gold bond cream use.  Relates to using Nystatin cream.       OBJECTIVE FINDINGS:  DP and PT are 2/4 bilaterally.  She has dorsally contracted digits bilaterally 1-5. There is no erythema, no drainage, no odor, no calor bilaterally. She has incurvated nails 1-5 bilaterally to differing degrees with pain on palpation.  She has some thickening, dystrophy and subungual debris of toenails bilaterally.      ASSESSMENT AND PLAN: Onychauxis and Onychomycosis bilaterally causing pain. She is diabetic with peripheral neuropathy. Diagnosis and treatment discussed with her. All the nails were reduced or debrided bilaterally upon consent. Continue Nystatin cream given and use discussed with her.  She will return to clinic to see me in about 2-3 months.

## 2021-05-07 NOTE — PROGRESS NOTES
PROGRESS  REPORT    Progress reporting period is from 1/15/2021 to 4/23/2021.   Elizabeth has been seen in PT 10 times for bilateral knee pain.  Treatment has included manual therapy, knee strengthening and ambulation on the Alter G anti gravity treadmill.    SUBJECTIVE   Subjective: increased right lateral knee pain.  Trying to walk more     Current Pain level: 4/10.      Initial Pain level: 8/10.   Changes in function:  See goal flow sheet  Adverse reaction to treatment or activity: None    OBJECTIVE  Changes noted in objective findings:    Objective: unable to do > 10 SLR either leg due to fatigue,  Can do 10 sit to stand today but last 2 shakey.  Amb without AD with minimally antalgic gait.       ASSESSMENT/PLAN  Updated problem list and treatment plan: Diagnosis 1:  Bilateral knee pain  Pain -  self management, education and home program  Decreased strength - therapeutic exercise and therapeutic activities  Impaired gait - gait training  Decreased function - therapeutic activities  STG/LTGs have been met or progress has been made towards goals:  See goal flow sheet  Assessment of Progress: The patient's condition is improving.  Self Management Plans:  Patient has been instructed in a home treatment program.  I have re-evaluated this patient and find that the nature, scope, duration and intensity of the therapy is appropriate for the medical condition of the patient.  Elizabeth continues to require the following intervention to meet STG and LTG's:  PT    Recommendations:  This patient would benefit from continued therapy.     Frequency:  1 X week, once daily  Duration:  for 4 weeks        Please refer to the daily flowsheet for treatment today, total treatment time and time spent performing 1:1 timed codes.

## 2021-05-13 ENCOUNTER — TELEPHONE (OUTPATIENT)
Dept: TRANSPLANT | Facility: CLINIC | Age: 66
End: 2021-05-13

## 2021-05-13 ENCOUNTER — HOSPITAL ENCOUNTER (OUTPATIENT)
Dept: LAB | Facility: CLINIC | Age: 66
Discharge: HOME | End: 2021-05-13
Attending: INTERNAL MEDICINE | Admitting: INTERNAL MEDICINE
Payer: MEDICARE

## 2021-05-13 DIAGNOSIS — Z94.0 KIDNEY TRANSPLANTED: ICD-10-CM

## 2021-05-13 DIAGNOSIS — Z94.0 KIDNEY REPLACED BY TRANSPLANT: Primary | ICD-10-CM

## 2021-05-13 DIAGNOSIS — Z79.899 ENCOUNTER FOR LONG-TERM CURRENT USE OF MEDICATION: ICD-10-CM

## 2021-05-13 DIAGNOSIS — Z48.298 AFTERCARE FOLLOWING ORGAN TRANSPLANT: ICD-10-CM

## 2021-05-13 DIAGNOSIS — Z94.0 KIDNEY REPLACED BY TRANSPLANT: ICD-10-CM

## 2021-05-13 LAB
ANION GAP SERPL CALCULATED.3IONS-SCNC: 6 MMOL/L (ref 3–14)
BUN SERPL-MCNC: 14 MG/DL (ref 7–30)
CALCIUM SERPL-MCNC: 8.9 MG/DL (ref 8.5–10.1)
CHLORIDE SERPL-SCNC: 109 MMOL/L (ref 94–109)
CO2 SERPL-SCNC: 22 MMOL/L (ref 20–32)
CREAT SERPL-MCNC: 1.19 MG/DL (ref 0.52–1.04)
CREAT UR-MCNC: 110 MG/DL
CYCLOSPORINE BLD LC/MS/MS-MCNC: 96 UG/L (ref 50–400)
ERYTHROCYTE [DISTWIDTH] IN BLOOD BY AUTOMATED COUNT: 13.2 % (ref 10–15)
GFR SERPL CREATININE-BSD FRML MDRD: 48 ML/MIN/{1.73_M2}
GLUCOSE SERPL-MCNC: 155 MG/DL (ref 70–99)
HCT VFR BLD AUTO: 42.4 % (ref 35–47)
HGB BLD-MCNC: 13.5 G/DL (ref 11.7–15.7)
MCH RBC QN AUTO: 28.9 PG (ref 26.5–33)
MCHC RBC AUTO-ENTMCNC: 31.8 G/DL (ref 31.5–36.5)
MCV RBC AUTO: 91 FL (ref 78–100)
PLATELET # BLD AUTO: 165 10E9/L (ref 150–450)
POTASSIUM SERPL-SCNC: 4.4 MMOL/L (ref 3.4–5.3)
PROT UR-MCNC: 0.16 G/L
PROT/CREAT 24H UR: 0.15 G/G CR (ref 0–0.2)
PTH-INTACT SERPL-MCNC: 88 PG/ML (ref 12–64)
RBC # BLD AUTO: 4.67 10E12/L (ref 3.8–5.2)
SODIUM SERPL-SCNC: 137 MMOL/L (ref 133–144)
TME LAST DOSE: NORMAL H
WBC # BLD AUTO: 3.9 10E9/L (ref 4–11)

## 2021-05-13 PROCEDURE — 36415 COLL VENOUS BLD VENIPUNCTURE: CPT | Performed by: INTERNAL MEDICINE

## 2021-05-13 PROCEDURE — 83970 ASSAY OF PARATHORMONE: CPT | Performed by: INTERNAL MEDICINE

## 2021-05-13 PROCEDURE — 80048 BASIC METABOLIC PNL TOTAL CA: CPT | Performed by: INTERNAL MEDICINE

## 2021-05-13 PROCEDURE — 84156 ASSAY OF PROTEIN URINE: CPT | Performed by: INTERNAL MEDICINE

## 2021-05-13 PROCEDURE — 80158 DRUG ASSAY CYCLOSPORINE: CPT | Performed by: INTERNAL MEDICINE

## 2021-05-13 PROCEDURE — 85027 COMPLETE CBC AUTOMATED: CPT | Performed by: INTERNAL MEDICINE

## 2021-05-13 NOTE — TELEPHONE ENCOUNTER
"ISSUE  Creatinine 1.19, baseline 0.9-1.0  Cyclosporine level 96-at goal     PLAN  Call and assess hydration status.  How much water is she drinking per day?  Any recent illness, diarrhea, s/s of a UTI, or medication changes?  Any increased intake of alcohol or caffeine?  Recommend increasing hydration and repeating labs in 1-2 weeks.    OUTCOME  Elizabeth states she has not been hydrating well. Her mother passed away on Sunday and she has been very distracted.  States she has some diarrhea at baseline, but it has been getting better.  She is taking Lasix 40 mg daily. Has stopped the Metolazone. Swelling is \"good\", is wearing RUI hose daily.  Elizabeth will increase hydration and repeat labs in 1-2 weeks.    "

## 2021-05-14 ENCOUNTER — THERAPY VISIT (OUTPATIENT)
Dept: PHYSICAL THERAPY | Facility: CLINIC | Age: 66
End: 2021-05-14
Payer: MEDICARE

## 2021-05-14 DIAGNOSIS — G89.29 CHRONIC PAIN OF BOTH KNEES: ICD-10-CM

## 2021-05-14 DIAGNOSIS — M25.561 CHRONIC PAIN OF BOTH KNEES: ICD-10-CM

## 2021-05-14 DIAGNOSIS — M25.562 CHRONIC PAIN OF BOTH KNEES: ICD-10-CM

## 2021-05-14 PROCEDURE — 97110 THERAPEUTIC EXERCISES: CPT | Mod: GP | Performed by: PHYSICAL THERAPIST

## 2021-05-14 PROCEDURE — 97530 THERAPEUTIC ACTIVITIES: CPT | Mod: GP | Performed by: PHYSICAL THERAPIST

## 2021-05-26 DIAGNOSIS — Z48.298 AFTERCARE FOLLOWING ORGAN TRANSPLANT: ICD-10-CM

## 2021-05-26 DIAGNOSIS — Z94.0 KIDNEY REPLACED BY TRANSPLANT: Primary | ICD-10-CM

## 2021-05-26 NOTE — PROGRESS NOTES
"Elizabeth Palma is a 65 year old female being evaluated via a billable telephone visit.     \"This telephone visit will be conducted via a call between you and your physician/provider. We have found that certain health care needs can be provided without the need for an in-person visit or physical exam.  This service lets us provide the care you need with a telephone conversation.  If a prescription is necessary we can send it directly to your pharmacy.  If lab work is needed we can place an order for that and you can then stop by our lab to have the test done at a later time.\"    Telephone visits are billed at different rates depending on your insurance coverage.  Please reach out to your insurance provider with any questions.    Patient has given verbal consent for  a Telephone visit? Yes    What telephone number would you like your provider to contact at at:  [phone]     How would you like to obtain your AVS? Caty Padilla MA    Telephone Visit Details:     Telephone Visit Start Time: 2:19 PM    Telephone Visit End Time:2:30 PM       SLEEP MEDICINE  TELEPHONE FOLLOW-UP VISIT  Sleep Psychology    Patient Name: Elizabeth Palma  MRN:  [id number]  Date of Service: May 27, 2021       Subjective Report     Elizabeth Palma  returns for a telephone visit to discuss progress in implementing behavioral strategies for the management of insomnia.  Patient consent for initiation of telephone visit was obtained and documented prior to initiation of visit.     Elizabeth reports she started a new diet that has caused fatigue.  It is heavy on protein intake and she reports increase in her creatinine.  She is now back on regular diet for the most part.  She notes that these instructions were conveyed by her chiropractor.  She reports increased tiredness and fatigue on her new diet.  She hopes that returning to nutritious diet may help reduce her fatigue.    Usually go to bed about 11 PM, she is has an inadvertent " nap at ten and awakens to go to bed. Wake up about 630 AM.  She reports her sleep is solid.    She reports less daytime tiredness and sleepiness.  She notes she has been able to drive about 1.5 hours without any sleepiness. She is reporting carefulness around avoiding driving while drowsy.    .     .     Sleep Data:     Source of Sleep Estimates:  verbal self-report    Patient self data indicates that sleep is generally well consolidated with sleep latency, wake after sleep onset normal sleep efficiency.  To be above 90%.  Patient was advised to increase time in bed to 8 hours between 1030-6:30 AM.    EPWORTH SLEEPINESS SCALE    Sitting and reading 3   Watching TV 3   Sitting, inactive in a public place (theatre or mtg.) 1    As a passenger in a car 3   Lying down to rest in the afternoon when circumstance permit 3   Sitting and talking to someone 0   Sitting quietly after lunch without alcohol 0   In a car, while stopped for a few minutes in traffic 0   TOTAL SCORE (nl <11) 13     INSOMNIA SEVERITY INDEX     Difficulty falling asleep 0   Difficult staying asleep 0   Problems waking up to early 0   How SATISFIED/DISSATISFIED are you with your CURRENT sleep pattern? 3   How NOTICEABLE to others do you think your sleep pattern is in terms of your quality of life? 1   How WORRIED/DISTRESSED are you about your current sleep pattern? 4   To what extent do you consider your sleep problem to INTERFERE with your daily fuctioning(e.g. daytime fatigue, mood, ability to function at work/daily chores, concentration, mood,etc.) CURRENTLY? 4   INSOMNIA SEVERITY INDEX TOTAL SCORE 12    Absence of insomnia (0-7); sub-threshold insomnia (8-14); moderate insomnia (15-21); and severe insomnia (22-28)       Interventions     Strategies and recommendations including maintenance of stimulus control were discussed today.       Vital Signs     LMP  (LMP Unknown)      Mental Status   Orientation:  X3  Mood:  normal  Speech/Language:   Normal  Thought Process: Intact  Associations:  Normal  Thought Content: Normal  Patient does not report any suicidal ideation, intention or plan.      Diagnostic Impressions and Plan     Chronic insomnia    Plan:  adjust sleep schedule  To 8 hours between 10 30-6 3 AM.  She will generally continue to try to avoid naps but may take 1 single nap within 8 hours of getting up.  She will continue to avoid inadvertent naps are close to bedtime.  She is reporting reduced daytime tiredness and sleepiness.  She will continue to avoid driving if drowsy    Follow-up: as needed if symptoms recur      Juan Quintanilla PsyD, DERRICK, DBSM  Diplomate, Behavioral Sleep Medicine  [facility]    Note: This dictation was created using voice recognition software. This document may contain an error not identified before finalizing the document. If the error changes the accuracy of the document, I would appreciate it being brought to my attention.

## 2021-05-27 ENCOUNTER — HOSPITAL ENCOUNTER (OUTPATIENT)
Dept: LAB | Facility: CLINIC | Age: 66
Discharge: HOME | End: 2021-05-27
Attending: INTERNAL MEDICINE | Admitting: INTERNAL MEDICINE
Payer: MEDICARE

## 2021-05-27 ENCOUNTER — TELEPHONE (OUTPATIENT)
Dept: TRANSPLANT | Facility: CLINIC | Age: 66
End: 2021-05-27

## 2021-05-27 ENCOUNTER — VIRTUAL VISIT (OUTPATIENT)
Dept: SLEEP MEDICINE | Facility: CLINIC | Age: 66
End: 2021-05-27
Payer: MEDICARE

## 2021-05-27 DIAGNOSIS — Z48.22 ENCOUNTER FOR AFTERCARE FOLLOWING KIDNEY TRANSPLANT: ICD-10-CM

## 2021-05-27 DIAGNOSIS — Z48.298 AFTERCARE FOLLOWING ORGAN TRANSPLANT: ICD-10-CM

## 2021-05-27 DIAGNOSIS — Z94.0 KIDNEY REPLACED BY TRANSPLANT: ICD-10-CM

## 2021-05-27 DIAGNOSIS — Z94.0 KIDNEY REPLACED BY TRANSPLANT: Primary | ICD-10-CM

## 2021-05-27 DIAGNOSIS — F51.04 CHRONIC INSOMNIA: Primary | ICD-10-CM

## 2021-05-27 LAB
ANION GAP SERPL CALCULATED.3IONS-SCNC: 10 MMOL/L (ref 3–14)
BUN SERPL-MCNC: 47 MG/DL (ref 7–30)
CALCIUM SERPL-MCNC: 10.4 MG/DL (ref 8.5–10.1)
CHLORIDE SERPL-SCNC: 98 MMOL/L (ref 94–109)
CO2 SERPL-SCNC: 25 MMOL/L (ref 20–32)
CREAT SERPL-MCNC: 1.38 MG/DL (ref 0.52–1.04)
GFR SERPL CREATININE-BSD FRML MDRD: 40 ML/MIN/{1.73_M2}
GLUCOSE SERPL-MCNC: 181 MG/DL (ref 70–99)
POTASSIUM SERPL-SCNC: 3.4 MMOL/L (ref 3.4–5.3)
PTH-INTACT SERPL-MCNC: 38 PG/ML (ref 12–64)
SODIUM SERPL-SCNC: 133 MMOL/L (ref 133–144)

## 2021-05-27 PROCEDURE — 99207 PR NO BILLABLE SERVICE THIS VISIT: CPT | Performed by: PSYCHOLOGIST

## 2021-05-27 PROCEDURE — 83970 ASSAY OF PARATHORMONE: CPT | Performed by: INTERNAL MEDICINE

## 2021-05-27 PROCEDURE — 80048 BASIC METABOLIC PNL TOTAL CA: CPT | Performed by: INTERNAL MEDICINE

## 2021-05-27 PROCEDURE — 36415 COLL VENOUS BLD VENIPUNCTURE: CPT | Performed by: INTERNAL MEDICINE

## 2021-05-27 NOTE — TELEPHONE ENCOUNTER
ISSUE  Creatinine 1.38 on recheck, up from 1.19. Baseline 0.9-1.0.      PLAN  Call and assess hydration status.  How much water is she drinking per day?  Any recent illness, diarrhea, s/s of a UTI, or medication changes?  Any increased intake of alcohol or caffeine?  Recommend increasing hydration and repeating labs within 1 week.    OUTCOME  Elizabeth states she recently started a diet through her chiropractor, was taking protein shakes BID, only drinking alkaline water and taking herbal supplements. States she lost 5 lbs in 1 weeks. She denies any diarrhea/vomiting.  Educated Elizabeth that protein shakes are not a good option for kidney transplant patients, and all herbal supplements should be OK'd by transplant before taking.   Requested that Elizabeth stop the supplements and alkaline water. Increase hydration with regular, filtered water and repeat labs in 1 week.  Will place transplant dietician referral.  Message sent to provider regarding alkaline water.    ADDENDUM:  Francisco Jordan MD Harris, Kathleen, RN             She can keep drinking the alkaline water. I want you to please tell her that there is no scientific basis in alkaline water. Herbal supplements can interact with her immunosuppressants and cause organ injury. The CORA is likely due to decreased sodium intake      OUTCOME  Spoke with Elizabeth regarding Dr. Jordan's recommendations. Elizabeth verbalized understanding. States she is working on good hydration, will repeat labs on Monday. States she has stopped protein shakes, she has stopped supplements-will discuss with dietician at her appointment 6/8/21.

## 2021-05-28 ENCOUNTER — TELEPHONE (OUTPATIENT)
Dept: TRANSPLANT | Facility: CLINIC | Age: 66
End: 2021-05-28

## 2021-05-28 NOTE — TELEPHONE ENCOUNTER
May 28, 2021 8:58 AM -  AIVERSE1: called pt to [facility] nutrition appt 6/8 phone /vm

## 2021-05-30 DIAGNOSIS — F43.12 NIGHTMARES ASSOCIATED WITH CHRONIC POST-TRAUMATIC STRESS DISORDER: ICD-10-CM

## 2021-05-30 DIAGNOSIS — F51.5 NIGHTMARES ASSOCIATED WITH CHRONIC POST-TRAUMATIC STRESS DISORDER: ICD-10-CM

## 2021-06-01 ENCOUNTER — VIRTUAL VISIT (OUTPATIENT)
Dept: NEUROLOGY | Facility: CLINIC | Age: 66
End: 2021-06-01
Payer: MEDICARE

## 2021-06-01 DIAGNOSIS — R25.1 TREMOR: Primary | ICD-10-CM

## 2021-06-01 PROCEDURE — 99214 OFFICE O/P EST MOD 30 MIN: CPT | Mod: 95 | Performed by: PSYCHIATRY & NEUROLOGY

## 2021-06-01 NOTE — LETTER
2021       RE: Elizabeth Palma  [address]  [address]  [place] [place] [facility] [address]     Dear Colleague,    Thank you for referring your patient, Elizabeth Palma, to the [facility] [place] at [facility]. Please see a copy of my visit note below.    Elizabeth is a 65 year old who is being evaluated via a billable video visit.      How would you like to obtain your AVS? Mail a copy  If the video visit is dropped, the invitation should be resent by: Send to e-mail at: [email or web address]    Video-Visit Details    Type of service:  Video Visit x 30 min        Platform used for Video Visit: Tictail      Service Date: 2021    Horacio Sheridan MD  [facility]  [address]  [place]  [address]    RE:  Elizabeth Palma  MRN:  [id number]  :  1955    Dear Horacio:      We had the privilege of doing a video visit over 30 minutes with Ms. Elizabeth Palma.  Her  was in assistance.  Ms. Palma had been previously seen regarding what appeared to be familial essential tremor.  It was thought perhaps it was related to her medication for having had a kidney transplant.  I saw her in 2021 and we did not want to do any medication intervention because she is on cyclosporine and has had rejection issues.  She has had some episodes she described as myoclonus.  There was a family history of tremor, but we wonder also about metabolic tremor.  She continued to have them and have gotten more severe.  She says she can hardly drink coffee in the morning.  She says that her kidneys are showing some deterioration as her creatinine is 1.38.    We reexamined her today.  Her blood pressure was 120/79.  She is able to walk, but she does have a loss of associated movements.  Her tremor is totally action tremor and intentional.  Muscle tone appears slightly increased on the right side.  Her eye movements are full.  Her face shows some masking and  there is good coordination in her legs and she can get up and walk on heels and toes.      In summary, she still has essential tremor with some atypical features.  There is a positive family history of it.  We would like to put her on low dose primidone, but she is on immunosuppressant drugs which metabolism can be affected, especially with phenobarbital.  She is on cyclosporine as well as I believe mycophenolate.    We have consulted about this quandary.  She is on gabapentin on a good dose and it helps her with pain.  She is on 600 three times a day.  There are some atypical features to it and we may need to have Dr. Painting see her in consultation.  She says she is quite distressed by it.  There are no other neurological issues.    Thank you for referring her to Neurology.    Sincerely,    MD Frantz Trent MD        D: 2021   T: 2021   MT: dilan    Name:     ANITA ULRICH  MRN:      [id number]        Account:      [id number]   :      1955           Service Date: 2021       Document: [id number]        Again, thank you for allowing me to participate in the care of your patient.      Sincerely,    Frantz Jacome MD

## 2021-06-01 NOTE — PROGRESS NOTES
Service Date: 2021    Horacio Sheridan MD  [facility]  [address]  [place]  [address]    RE:  Elizabeth Palma  MRN:  [id number]  :  1955    Dear Horacio:      We had the privilege of doing a video visit over 30 minutes with Ms. Elizabeth Palma.  Her  was in assistance.  Ms. Pamla had been previously seen regarding what appeared to be familial essential tremor.  It was thought perhaps it was related to her medication for having had a kidney transplant.  I saw her in 2021 and we did not want to do any medication intervention because she is on cyclosporine and has had rejection issues.  She has had some episodes she described as myoclonus.  There was a family history of tremor, but we wonder also about metabolic tremor.  She continued to have them and have gotten more severe.  She says she can hardly drink coffee in the morning.  She says that her kidneys are showing some deterioration as her creatinine is 1.38.    We reexamined her today.  Her blood pressure was 120/79.  She is able to walk, but she does have a loss of associated movements.  Her tremor is totally action tremor and intentional.  Muscle tone appears slightly increased on the right side.  Her eye movements are full.  Her face shows some masking and there is good coordination in her legs and she can get up and walk on heels and toes.      In summary, she still has essential tremor with some atypical features.  There is a positive family history of it.  We would like to put her on low dose primidone, but she is on immunosuppressant drugs which metabolism can be affected, especially with phenobarbital.  She is on cyclosporine as well as I believe mycophenolate.    We have consulted about this quandary.  She is on gabapentin on a good dose and it helps her with pain.  She is on 600 three times a day.  There are some atypical features to it and we may need to have Dr. Painting see her in consultation.  She says she is quite  distressed by it.  There are no other neurological issues.    Thank you for referring her to Neurology.    Sincerely,    MD Frantz Trent MD        D: 2021   T: 2021   MT: dilan    Name:     ANITA ULRICH  MRN:      [id number]        Account:      [id number]   :      1955           Service Date: 2021       Document: [id number]

## 2021-06-01 NOTE — PROGRESS NOTES
Elizabeth is a 65 year old who is being evaluated via a billable video visit.      How would you like to obtain your AVS? Mail a copy  If the video visit is dropped, the invitation should be resent by: Send to e-mail at: [email or web address]    Video-Visit Details    Type of service:  Video Visit x 30 min        Platform used for Video Visit: Angela

## 2021-06-02 NOTE — TELEPHONE ENCOUNTER
Medication requested:  PRAZOSIN 2MG CAPSULES Take 2mg cap + 3 x 5mg caps (15mg) at bedtime (total dose=17mg)  Last refilled: 12/3/20  Qty: 90/1      Last seen: 3/4/21  RTC: 1 month  Cancel: 0  No-show: 0  Next appt: 7/8/21    Refill decision:   Refill pended and routed to the provider for review/determination due to outside of return timeframe( due early April). Has return scheduled for 7/8/21.

## 2021-06-03 DIAGNOSIS — H90.3 SENSORY HEARING LOSS, BILATERAL: Primary | ICD-10-CM

## 2021-06-03 RX ORDER — PRAZOSIN HYDROCHLORIDE 2 MG/1
CAPSULE ORAL
Qty: 90 CAPSULE | Refills: 0 | Status: SHIPPED | OUTPATIENT
Start: 2021-06-03 | End: 2021-07-08

## 2021-06-07 ENCOUNTER — HOSPITAL ENCOUNTER (OUTPATIENT)
Dept: LAB | Facility: CLINIC | Age: 66
Discharge: HOME | End: 2021-06-07
Attending: INTERNAL MEDICINE | Admitting: INTERNAL MEDICINE
Payer: MEDICARE

## 2021-06-07 ENCOUNTER — TELEPHONE (OUTPATIENT)
Dept: TRANSPLANT | Facility: CLINIC | Age: 66
End: 2021-06-07

## 2021-06-07 DIAGNOSIS — Z94.0 KIDNEY REPLACED BY TRANSPLANT: ICD-10-CM

## 2021-06-07 DIAGNOSIS — Z48.298 AFTERCARE FOLLOWING ORGAN TRANSPLANT: ICD-10-CM

## 2021-06-07 LAB
ANION GAP SERPL CALCULATED.3IONS-SCNC: 8 MMOL/L (ref 3–14)
BUN SERPL-MCNC: 17 MG/DL (ref 7–30)
CALCIUM SERPL-MCNC: 9.2 MG/DL (ref 8.5–10.1)
CHLORIDE SERPL-SCNC: 103 MMOL/L (ref 94–109)
CO2 SERPL-SCNC: 23 MMOL/L (ref 20–32)
CREAT SERPL-MCNC: 1.06 MG/DL (ref 0.52–1.04)
GFR SERPL CREATININE-BSD FRML MDRD: 56 ML/MIN/{1.73_M2}
GLUCOSE SERPL-MCNC: 174 MG/DL (ref 70–99)
POTASSIUM SERPL-SCNC: 3.7 MMOL/L (ref 3.4–5.3)
SODIUM SERPL-SCNC: 134 MMOL/L (ref 133–144)

## 2021-06-07 PROCEDURE — 36415 COLL VENOUS BLD VENIPUNCTURE: CPT | Performed by: INTERNAL MEDICINE

## 2021-06-07 PROCEDURE — 80048 BASIC METABOLIC PNL TOTAL CA: CPT | Performed by: INTERNAL MEDICINE

## 2021-06-07 NOTE — TELEPHONE ENCOUNTER
ISSUE  Creatinine back down to 1.06 (from 1.38)    OUTCOME  Elizabeth reports continuing to drink alkaline water, but has stopped diet supplements and protein shakes. She will meet with transplant dialectician tomorrow. She will continue with good hydration.

## 2021-06-07 NOTE — PROGRESS NOTES
[facility]  Outpatient MNT     Time Spent: 15 minutes  Visit Type: follow up (most recently seen by OP weight management RD in April)- s/p gastric bypass 1990   Referring Physician: Julian   Reason for RD Visit: pt desire for weight loss   Pt accompanied by: self     Nutrition Assessment  Lost weight rapidly through program through chiropractor, made Creatinine shoot up. Stopped this program and has regained some weight. She reports purchasing over $600 in herbs and she is wanting another input on if she can take these. She reports losing 9 lbs x 1 week by drinking 2 8-ounce pro drinks/day + 1 small meal at night, consisting of 1 oz protein + veggies.     Now she is eating TID meals- consisting of 1-2 oz protein at meals + fruits and veggies. She drinks 1 gallon water/day. No alcohol. She reports she gets too full if she eats more than 1-2 oz protein at meals. She reports eating protein first prior to f/v. She reported the protein drinks were also challenging to get down/feels as if she would vomit after drinking these.     Kidney txp 2014, eGFR 56     Vitamins, Supplements, Pertinent Meds: vit D, vit C, vit B12  Herbal Medicines/Supplements: pt will mychart photos of herbs     Weight hx: stable ~200 lbs for years; currently 196 lbs    Physical Activity  Pt reports walking is limited d/t knee pain. Walks long hallway in building and some steps.      Anthropometrics  IBW/%IBW: 105/187  Dosing wt: 128 lbs/58 kg    Estimated Nutrition Needs   Protein: 46-58 g/day (0.8-1 g/kg) for maintenance based on kidney fxn     Nutrition Diagnosis  Inadequate protein intake related to early satiety with protein foods as evidenced by pt report, likely taking in at most 35-40 g protein/day.    Nutrition Intervention  1. Pt to send me photos of herbs via my chart. Either way, we discussed that herbs are contraindicated after transplant and that they can contribute to rejection and lab abnormalities.   2. Reviewed that she is  likely not taking in enough protein. Discussed trying Prostat or Liquacel for a small volume option. She is taking 7-15 g protein at meals x 3 (21-45 g/day). Suggest 1-2 servings of Prostat/day based on oral intakes.   3. Reviewed option to meet with weight management team again, but pt reports she isn't sure this was helpful.     Patient Understanding: Pt verbalized understanding of education provided.  Expected Engagement: Good  Follow-Up Plans: message in 1 month to check in      Nutrition Goals  1. 46-58 g protein/day  2. Weight loss <196 lbs per pt desire   3. Stop taking herbal products     Xin Herring, RD, LD, CCTD    Pt evaluated via billable telephone visit d/t COVID-19 restrictions. Time spent: 15 min

## 2021-06-08 ENCOUNTER — ALLIED HEALTH/NURSE VISIT (OUTPATIENT)
Dept: TRANSPLANT | Facility: CLINIC | Age: 66
End: 2021-06-08
Attending: INTERNAL MEDICINE
Payer: MEDICARE

## 2021-06-08 DIAGNOSIS — E11.42 TYPE 2 DIABETES MELLITUS WITH PERIPHERAL NEUROPATHY (H): ICD-10-CM

## 2021-06-08 DIAGNOSIS — N18.30 CHRONIC KIDNEY DISEASE, STAGE 3 (H): Primary | ICD-10-CM

## 2021-06-08 PROCEDURE — 97802 MEDICAL NUTRITION INDIV IN: CPT | Mod: TEL | Performed by: DIETITIAN, REGISTERED

## 2021-06-15 ENCOUNTER — TELEPHONE (OUTPATIENT)
Dept: TRANSPLANT | Facility: CLINIC | Age: 66
End: 2021-06-15

## 2021-06-15 ENCOUNTER — VIRTUAL VISIT (OUTPATIENT)
Dept: INTERNAL MEDICINE | Facility: CLINIC | Age: 66
End: 2021-06-15
Payer: MEDICARE

## 2021-06-15 DIAGNOSIS — Z94.0 KIDNEY TRANSPLANTED: ICD-10-CM

## 2021-06-15 DIAGNOSIS — R52 GENERALIZED PAIN: ICD-10-CM

## 2021-06-15 DIAGNOSIS — Z79.899 OTHER LONG TERM (CURRENT) DRUG THERAPY: ICD-10-CM

## 2021-06-15 DIAGNOSIS — Z94.0 KIDNEY TRANSPLANTED: Primary | ICD-10-CM

## 2021-06-15 DIAGNOSIS — R53.83 FATIGUE, UNSPECIFIED TYPE: Primary | ICD-10-CM

## 2021-06-15 DIAGNOSIS — R53.83 FATIGUE, UNSPECIFIED TYPE: ICD-10-CM

## 2021-06-15 LAB
BASOPHILS # BLD AUTO: 0 10E9/L (ref 0–0.2)
BASOPHILS NFR BLD AUTO: 0.5 %
CRP SERPL-MCNC: 190 MG/L (ref 0–8)
DEPRECATED CALCIDIOL+CALCIFEROL SERPL-MC: 43 UG/L (ref 20–75)
DIFFERENTIAL METHOD BLD: ABNORMAL
EOSINOPHIL # BLD AUTO: 0.1 10E9/L (ref 0–0.7)
EOSINOPHIL NFR BLD AUTO: 0.9 %
ERYTHROCYTE [DISTWIDTH] IN BLOOD BY AUTOMATED COUNT: 14.2 % (ref 10–15)
HCT VFR BLD AUTO: 39.8 % (ref 35–47)
HGB BLD-MCNC: 13.2 G/DL (ref 11.7–15.7)
LYMPHOCYTES # BLD AUTO: 0.3 10E9/L (ref 0.8–5.3)
LYMPHOCYTES NFR BLD AUTO: 5.6 %
MCH RBC QN AUTO: 29.7 PG (ref 26.5–33)
MCHC RBC AUTO-ENTMCNC: 33.2 G/DL (ref 31.5–36.5)
MCV RBC AUTO: 90 FL (ref 78–100)
MONOCYTES # BLD AUTO: 0.6 10E9/L (ref 0–1.3)
MONOCYTES NFR BLD AUTO: 10.5 %
NEUTROPHILS # BLD AUTO: 4.7 10E9/L (ref 1.6–8.3)
NEUTROPHILS NFR BLD AUTO: 82.5 %
PLATELET # BLD AUTO: 144 10E9/L (ref 150–450)
RBC # BLD AUTO: 4.44 10E12/L (ref 3.8–5.2)
WBC # BLD AUTO: 5.7 10E9/L (ref 4–11)

## 2021-06-15 PROCEDURE — 85025 COMPLETE CBC W/AUTO DIFF WBC: CPT | Performed by: NURSE PRACTITIONER

## 2021-06-15 PROCEDURE — 99442 PR PHYSICIAN TELEPHONE EVALUATION 11-20 MIN: CPT | Mod: 95 | Performed by: NURSE PRACTITIONER

## 2021-06-15 PROCEDURE — 80053 COMPREHEN METABOLIC PANEL: CPT | Performed by: NURSE PRACTITIONER

## 2021-06-15 PROCEDURE — 36415 COLL VENOUS BLD VENIPUNCTURE: CPT | Performed by: NURSE PRACTITIONER

## 2021-06-15 PROCEDURE — 84443 ASSAY THYROID STIM HORMONE: CPT | Performed by: NURSE PRACTITIONER

## 2021-06-15 PROCEDURE — 86140 C-REACTIVE PROTEIN: CPT | Performed by: NURSE PRACTITIONER

## 2021-06-15 PROCEDURE — 82306 VITAMIN D 25 HYDROXY: CPT | Performed by: NURSE PRACTITIONER

## 2021-06-15 PROCEDURE — 82550 ASSAY OF CK (CPK): CPT | Performed by: NURSE PRACTITIONER

## 2021-06-15 PROCEDURE — 87799 DETECT AGENT NOS DNA QUANT: CPT | Performed by: NURSE PRACTITIONER

## 2021-06-15 NOTE — TELEPHONE ENCOUNTER
Spoke with Elizabeth and her  Rahat.  Elizabeth has not been feeling well the last couple of days.    She is complaining of extreme fatigue, malaise, and body aches.  No appetite and no energy  Denies nausea, vomiting, diarrhea, cough/cold like symptoms.   No fevers.   Discussed checking CMV, EBV.  Orders placed.  She is going to contact her PCP for evaluation of illness.

## 2021-06-15 NOTE — NURSING NOTE
Chief Complaint   Patient presents with     Recheck Medication     body aches and fatigue       Claudia Colindres EMT at 1:11 PM on 6/15/2021.

## 2021-06-15 NOTE — TELEPHONE ENCOUNTER
Patient Call: Transplant Illness  Muscle aches from head to toes,  Loss of appetite   reported her hands are very sore   About a week this has been going on   Transplanted organ? kidney  Illness: Pain: Location muscle aches from head to toes    Please call  Rahat #[phone]

## 2021-06-15 NOTE — PATIENT INSTRUCTIONS
[facility] Medication Refill Request Information:  * Please contact your pharmacy regarding ANY request for medication refills.  ** [facility] Prescription Fax = [phone]  * Please allow 3 business days for routine medication refills.  * Please allow 5 business days for controlled substance medication refills.     [facility] Test Result notification information:  *You will be notified with in 7-10 days of your appointment day regarding the results of your test.  If you are on MyChart you will be notified as soon as the provider has reviewed the results and signed off on them.    [facility]: [phone]

## 2021-06-15 NOTE — TELEPHONE ENCOUNTER
Xavier from   Pt not feeling well  Not getting better  No energy- sleeps a lot  achey  No appetite  Needs to use a walker for safety when up

## 2021-06-15 NOTE — PROGRESS NOTES
"Elizabeth is a 65 year old who is being evaluated via a billable telephone visit.      What phone number would you like to be contacted at? [phone]   How would you like to obtain your AVS? MyChart    Assessment & Plan     Fatigue, unspecified type  Generalized pain  Other long term (current) drug therapy   Unclear etiology at this point; besides the extreme fatigue and generalized body aches, denies any other symptoms. Will check CMP, CBC, Vit D, TSH, inflammatory markers and CK, as well as UA/UC. Recommended staying well-hydrated. Advised that if symptoms acutely worsen, seek emergency care.   - Comprehensive metabolic panel; Future  - CBC with platelets differential; Future  - Vitamin D Deficiency; Future  - TSH with free T4 reflex; Future  - UA with Micro reflex to Culture; Future  - CK total; Future  - CRP inflammation; Future  - Vitamin D Deficiency; Future      29 minutes spent on the date of the encounter doing chart review, history and exam, documentation and further activities per the note           Follow-up if symptoms persist, pending lab results    GERHARD Epstein CNP  [facility] INTERNAL MEDICINE [place]    Subjective   Elizabeth is a 65 year old who presents for the following health issues     HPI       Elizabeth reports extreme fatigue and generalized body pain that started 2 days ago, complaining \"I'm really, really tired,\" and \"I hurt real bad all over\".  She has been in bed because she can't stay awake. Denies fevers, rashes, CP, SOB, abdominal pain, or N/V. No urinary changes. Low appetite but did eat today, feels she is drinking well. She has taken Tylenol for body pain, but this doesn't help.   Is sleeping all night and \"all the time\" during the day.  has to wake her up to take her medication.   No medication changes other than that she stopped taking over-the-counter weight loss supplements, after seeing her rising creatinine on 5/27/21.  She spoke with her " transplant team this morning EBV and CMV to be checked.    Review of Systems   Constitutional, HEENT, cardiovascular, pulmonary, gi and gu systems are negative, except as otherwise noted.      Objective           Vitals:  No vitals were obtained today due to virtual visit.    Physical Exam   General: alert and no distress  PSYCH: Alert and oriented times 3; coherent speech, normal   rate and volume, able to articulate logical thoughts, able   to abstract reason, no tangential thoughts, no hallucinations   or delusions  Her affect is normal  RESP: No cough, no audible wheezing, able to talk in full sentences  Remainder of exam unable to be completed due to telephone visits                Phone call duration: 12 minutes

## 2021-06-16 ENCOUNTER — HOSPITAL ENCOUNTER (EMERGENCY)
Facility: CLINIC | Age: 66
Discharge: HOME | DRG: 699 | End: 2021-06-17
Attending: EMERGENCY MEDICINE | Admitting: EMERGENCY MEDICINE
Payer: MEDICARE

## 2021-06-16 ENCOUNTER — TELEPHONE (OUTPATIENT)
Dept: INTERNAL MEDICINE | Facility: CLINIC | Age: 66
End: 2021-06-16
Payer: MEDICARE

## 2021-06-16 DIAGNOSIS — M06.4 INFLAMMATORY POLYARTHROPATHY (H): ICD-10-CM

## 2021-06-16 DIAGNOSIS — R79.82 ELEVATED C-REACTIVE PROTEIN (CRP): Primary | ICD-10-CM

## 2021-06-16 DIAGNOSIS — R79.82 CRP ELEVATED: ICD-10-CM

## 2021-06-16 LAB
ALBUMIN SERPL-MCNC: 3.3 G/DL (ref 3.4–5)
ALP SERPL-CCNC: 53 U/L (ref 40–150)
ALT SERPL W P-5'-P-CCNC: 22 U/L (ref 0–50)
ANION GAP SERPL CALCULATED.3IONS-SCNC: 11 MMOL/L (ref 3–14)
ANION GAP SERPL CALCULATED.3IONS-SCNC: 6 MMOL/L (ref 3–14)
AST SERPL W P-5'-P-CCNC: 8 U/L (ref 0–45)
BASOPHILS # BLD AUTO: 0 10E9/L (ref 0–0.2)
BASOPHILS NFR BLD AUTO: 0.4 %
BILIRUB SERPL-MCNC: 0.8 MG/DL (ref 0.2–1.3)
BUN SERPL-MCNC: 19 MG/DL (ref 7–30)
BUN SERPL-MCNC: 20 MG/DL (ref 7–30)
CALCIUM SERPL-MCNC: 9.1 MG/DL (ref 8.5–10.1)
CALCIUM SERPL-MCNC: 9.8 MG/DL (ref 8.5–10.1)
CHLORIDE SERPL-SCNC: 105 MMOL/L (ref 94–109)
CHLORIDE SERPL-SCNC: 107 MMOL/L (ref 94–109)
CK SERPL-CCNC: 69 U/L (ref 30–225)
CMV DNA SPEC NAA+PROBE-ACNC: NORMAL [IU]/ML
CMV DNA SPEC NAA+PROBE-LOG#: NORMAL {LOG_IU}/ML
CO2 SERPL-SCNC: 20 MMOL/L (ref 20–32)
CO2 SERPL-SCNC: 24 MMOL/L (ref 20–32)
CREAT SERPL-MCNC: 1.22 MG/DL (ref 0.52–1.04)
CREAT SERPL-MCNC: 1.24 MG/DL (ref 0.52–1.04)
CRP SERPL-MCNC: 232 MG/L (ref 0–8)
DIFFERENTIAL METHOD BLD: ABNORMAL
EOSINOPHIL # BLD AUTO: 0 10E9/L (ref 0–0.7)
EOSINOPHIL NFR BLD AUTO: 0 %
ERYTHROCYTE [DISTWIDTH] IN BLOOD BY AUTOMATED COUNT: 13.4 % (ref 10–15)
ERYTHROCYTE [SEDIMENTATION RATE] IN BLOOD BY WESTERGREN METHOD: 49 MM/H (ref 0–30)
GFR SERPL CREATININE-BSD FRML MDRD: 46 ML/MIN/{1.73_M2}
GFR SERPL CREATININE-BSD FRML MDRD: 47 ML/MIN/{1.73_M2}
GLUCOSE SERPL-MCNC: 193 MG/DL (ref 70–99)
GLUCOSE SERPL-MCNC: 203 MG/DL (ref 70–99)
HCT VFR BLD AUTO: 42.4 % (ref 35–47)
HGB BLD-MCNC: 13.7 G/DL (ref 11.7–15.7)
IMM GRANULOCYTES # BLD: 0.1 10E9/L (ref 0–0.4)
IMM GRANULOCYTES NFR BLD: 0.8 %
LYMPHOCYTES # BLD AUTO: 0.3 10E9/L (ref 0.8–5.3)
LYMPHOCYTES NFR BLD AUTO: 3.9 %
MCH RBC QN AUTO: 28.7 PG (ref 26.5–33)
MCHC RBC AUTO-ENTMCNC: 32.3 G/DL (ref 31.5–36.5)
MCV RBC AUTO: 89 FL (ref 78–100)
MONOCYTES # BLD AUTO: 0.8 10E9/L (ref 0–1.3)
MONOCYTES NFR BLD AUTO: 10.8 %
NEUTROPHILS # BLD AUTO: 6.2 10E9/L (ref 1.6–8.3)
NEUTROPHILS NFR BLD AUTO: 84.1 %
NRBC # BLD AUTO: 0 10*3/UL
NRBC BLD AUTO-RTO: 0 /100
PLATELET # BLD AUTO: 177 10E9/L (ref 150–450)
POTASSIUM SERPL-SCNC: 3.7 MMOL/L (ref 3.4–5.3)
POTASSIUM SERPL-SCNC: 3.8 MMOL/L (ref 3.4–5.3)
PROT SERPL-MCNC: 6.8 G/DL (ref 6.8–8.8)
RBC # BLD AUTO: 4.78 10E12/L (ref 3.8–5.2)
SODIUM SERPL-SCNC: 136 MMOL/L (ref 133–144)
SODIUM SERPL-SCNC: 137 MMOL/L (ref 133–144)
SPECIMEN SOURCE: NORMAL
TSH SERPL DL<=0.005 MIU/L-ACNC: 0.68 MU/L (ref 0.4–4)
WBC # BLD AUTO: 7.4 10E9/L (ref 4–11)

## 2021-06-16 PROCEDURE — 250N000011 HC RX IP 250 OP 636: Performed by: EMERGENCY MEDICINE

## 2021-06-16 PROCEDURE — 85025 COMPLETE CBC W/AUTO DIFF WBC: CPT | Performed by: EMERGENCY MEDICINE

## 2021-06-16 PROCEDURE — 86140 C-REACTIVE PROTEIN: CPT | Performed by: EMERGENCY MEDICINE

## 2021-06-16 PROCEDURE — 96374 THER/PROPH/DIAG INJ IV PUSH: CPT

## 2021-06-16 PROCEDURE — 258N000003 HC RX IP 258 OP 636: Performed by: EMERGENCY MEDICINE

## 2021-06-16 PROCEDURE — 80048 BASIC METABOLIC PNL TOTAL CA: CPT | Performed by: EMERGENCY MEDICINE

## 2021-06-16 PROCEDURE — 99284 EMERGENCY DEPT VISIT MOD MDM: CPT | Mod: 25

## 2021-06-16 PROCEDURE — 85652 RBC SED RATE AUTOMATED: CPT | Performed by: EMERGENCY MEDICINE

## 2021-06-16 PROCEDURE — 99207 E-CONSULT TO RHEUMATOLOGY (ADULT OUTPT PROVIDER TO SPECIALIST WRITTEN QUESTION & RESPONSE): CPT | Performed by: NURSE PRACTITIONER

## 2021-06-16 PROCEDURE — 250N000013 HC RX MED GY IP 250 OP 250 PS 637: Performed by: EMERGENCY MEDICINE

## 2021-06-16 PROCEDURE — 96361 HYDRATE IV INFUSION ADD-ON: CPT

## 2021-06-16 RX ORDER — SODIUM CHLORIDE 9 MG/ML
INJECTION, SOLUTION INTRAVENOUS CONTINUOUS
Status: DISCONTINUED | OUTPATIENT
Start: 2021-06-16 | End: 2021-06-17 | Stop reason: HOSPADM

## 2021-06-16 RX ORDER — HYDROCODONE BITARTRATE AND ACETAMINOPHEN 5; 325 MG/1; MG/1
2 TABLET ORAL ONCE
Status: COMPLETED | OUTPATIENT
Start: 2021-06-16 | End: 2021-06-16

## 2021-06-16 RX ORDER — ONDANSETRON 2 MG/ML
4 INJECTION INTRAMUSCULAR; INTRAVENOUS EVERY 30 MIN PRN
Status: DISCONTINUED | OUTPATIENT
Start: 2021-06-16 | End: 2021-06-17 | Stop reason: HOSPADM

## 2021-06-16 RX ADMIN — HYDROCODONE BITARTRATE AND ACETAMINOPHEN 2 TABLET: 5; 325 TABLET ORAL at 22:46

## 2021-06-16 RX ADMIN — SODIUM CHLORIDE 1000 ML: 9 INJECTION, SOLUTION INTRAVENOUS at 22:45

## 2021-06-16 RX ADMIN — ONDANSETRON 4 MG: 2 INJECTION INTRAMUSCULAR; INTRAVENOUS at 22:46

## 2021-06-16 ASSESSMENT — ENCOUNTER SYMPTOMS
BACK PAIN: 1
FATIGUE: 1
ARTHRALGIAS: 1
DYSURIA: 1
NAUSEA: 0
WEAKNESS: 1
VOMITING: 0
NECK PAIN: 1
HEADACHES: 0
FEVER: 0

## 2021-06-16 NOTE — TELEPHONE ENCOUNTER
[facility] Call Center    Phone Message    May a detailed message be left on voicemail: yes     Reason for Call: Other: Patients  calling requesting a return call to discuss patients recent lab results. Patient is concerned at results and wondering if she should go into the ER. Please call to discuss thank you.      Action Taken: Message routed to:  [facility] pcc    Travel Screening: Not Applicable

## 2021-06-16 NOTE — ED TRIAGE NOTES
"Pt states she has \" all over body pain.\"  Pt could not specify a specific body part; states that everything hurts and it has been that way for the past 2 weeks. Sent in by clinic due to CRP being 190  "

## 2021-06-17 VITALS
HEART RATE: 72 BPM | RESPIRATION RATE: 18 BRPM | TEMPERATURE: 99.7 F | DIASTOLIC BLOOD PRESSURE: 71 MMHG | SYSTOLIC BLOOD PRESSURE: 129 MMHG | OXYGEN SATURATION: 92 %

## 2021-06-17 LAB
EBV DNA # SPEC NAA+PROBE: NORMAL {COPIES}/ML
EBV DNA SPEC NAA+PROBE-LOG#: NORMAL {LOG_COPIES}/ML

## 2021-06-17 RX ORDER — PREDNISONE 20 MG/1
TABLET ORAL
Qty: 10 TABLET | Refills: 0 | Status: ON HOLD | OUTPATIENT
Start: 2021-06-17 | End: 2021-06-22

## 2021-06-17 NOTE — DISCHARGE INSTRUCTIONS
As we discussed, no clear cause for your elevated inflammatory markers was found today, but since you are having so much pain in all of your joints, it is possible that you are having an arthritis flare of some kind.  Out of an abundance of caution, and in desire to help your symptoms, we are giving you a 4-day steroid burst, which may help with some of your inflammation.  Regardless you absolutely need to follow with your regular doctor in the next 24 hours to call and begin the process of a referral to a rheumatologist.  If you cannot see a rheumatologist in the next week, you should absolutely see your regular doctor in the next 3 days in person.  Come back to the ER immediately with any worsening symptoms, if you have any fever, any specific or focal pain, or any changes of any kind.  Come back with any other concerns.

## 2021-06-17 NOTE — ED PROVIDER NOTES
History   Chief Complaint:  Generalized Weakness       HPI   Elizabeth Palma is a 65 year old female with history of degenerative joint disease who presents with generalized weakness and pain. The patient reports that she has had pain throughout her body for the last 2 weeks. She states the pain has worsened over the last 4 days and she has been sleeping more than usual. She notes experiencing dysuria as well. She reports taking two tylenol a day for the pain. She denies nausea, vomiting, fever or headache. She denies history of inflammatory diease's. She notes being sent here for high CRP levels.     Review of Systems   Constitutional: Positive for fatigue. Negative for fever.   Gastrointestinal: Negative for nausea and vomiting.   Genitourinary: Positive for dysuria.   Musculoskeletal: Positive for arthralgias, back pain and neck pain.   Neurological: Positive for weakness. Negative for headaches.   All other systems reviewed and are negative.        Allergies:  Oxycodone-Acetaminophen  Procaine Hcl  Iodinated contrast media      Medications:  N-Acetyl-L-Cysteine  Albuterol   Abilify  Aspirin 81 mg  cycloSPORINE  Vagifem  Ferosul  Flonase  Lasix  Neurontin  Glucophage-XR  Zaroxolyn  Mycophenolate   Mycostatin   Prilosec  Zofran-ODT  Zocor  Minipress  Bactrim  Topamax  Viibryd    Past Medical History:    [facility] DJD  Depression  Type 2 diabetes  GERD  Anxiety  Glaucoma  Hyperlipidemia  Hyperparathyroidism  Hypertension  Immunosuppressed  Obesity  Osteoporosis  LION  Restless leg syndrome  Rib fracture  Tremor  Asthma  CKD  Anemia   Neuropathy  Suicidal ideation  Chronic pain  PTSD  Plantar fasciitis  Capsulitis  Polycystic kidney  Osteomalacia      Past Surgical History:    Abdomen surgery  Ankle surgery  Kidney transplant   x 2  Colonscopy  Cholecystectomy  EGD x 2  Eye surgery  Laparoscopy, surgical repair of hernia  Orthopedic surgery  Wrist surgery       Family History:    Diabetes  Hypertension   Mental  illness  Hyperlipidemia      Social History:  The patient presents with her .   The patient spoke with a nurse practioner.       Physical Exam     Patient Vitals for the past 24 hrs:   BP Temp Temp src Pulse Resp SpO2   06/17/21 0023 -- 99.7  F (37.6  C) Oral -- -- --   06/17/21 0020 -- -- -- -- -- 92 %   06/17/21 0010 129/71 -- -- 72 -- --   06/16/21 1751 134/75 99.5  F (37.5  C) Temporal 74 18 98 %       Physical Exam  Vitals: reviewed by me  General: Pt seen on [facility], pleasant, cooperative, and alert to conversation, frail-appearing.  Eyes: Tracking well, clear conjunctiva BL  ENT: MMM, midline trachea.   Lungs: No tachypnea, no accessory muscle use. No respiratory distress.   CV: Rate as above, regular rhythm.    Abd: Soft, non tender, no guarding, no rebound. Non distended  MSK: no peripheral edema or joint effusion.  No evidence of trauma  Full range of motion to bilateral shoulders, elbows, wrist, hips, knees, ankles.  No lesions noted to hands palms or soles.  No other skin changes noted, no areas of warmth or erythema.  Skin: No rash, normal turgor and temperature  Neuro: Clear speech and no facial droop.  Psych: Not RIS, no e/o AH/VH      Emergency Department Course     Laboratory:  CBC: WBC: 7.4, HGB: 13.7, PLT: 177  BMP: Glucose 203 (H), GFR: 46 (L), o/w WNL (Creatinine: 1.24 (H))    CRP inflammation: 232.0 (H)    Erythrocyte sedimentation rate auto: 49 (H)      Emergency Department Course:    Reviewed:  I reviewed nursing notes, vitals, past medical history and care everywhere    Assessments:  2217 I obtained history and examined the patient as noted above.   0048 I rechecked the patient and explained findings.     Interventions:  2245: NS, 1 L, IV  2246: Norco, 2 tablet, PO  2246: Zofran, 4 mg, IV     Disposition:  The patient was discharged to home.       Impression & Plan     Medical Decision Making:  This is a very pleasant 65 year old female who presents to the emergency room with  generalized weakness, and what appears to be joint pain all throughout her body. She endorses pain throughout her entire body, head to toe, but there does seem to be a predominance in all of her joints, symmetrical that is. None of her joints seem to have effusions, there is no erythema, no skin findings that would be consistent with an infection, Nisseria, endocarditis, etc. However she does seem to have an elevated set of inflammatory markers, and in the absence of an infection I do think it is prudent to give a steroid burst and follow with a rheumatologist. She does not have a rheumatologist, so we will have her see her PMD tomorrow via telephone to organize a referral. It is not 100% clear what she has at this time, though I do think that she does warrant close out patient management. Antibiotics not indicated, no indication in my mind to admit her to the hospital, as this would not necessarily expedite her rheumatological workup. Will plan for discharge home with the above plan.    Diagnosis:    ICD-10-CM    1. Inflammatory polyarthropathy (H)  M06.4    2. CRP elevated  R79.82        Discharge Medications:  New Prescriptions    PREDNISONE (DELTASONE) 20 MG TABLET    Take two tablets (= 40mg) each day for 5 (five) days       Scribe Disclosure:  I, Jonathan Wyman, am serving as a scribe at 10:17 PM on 6/16/2021 to document services personally performed by Juan Armstrong MD based on my observations and the provider's statements to me.            Juan Armstrong MD  06/17/21 4794

## 2021-06-18 ENCOUNTER — HOSPITAL ENCOUNTER (INPATIENT)
Facility: CLINIC | Age: 66
LOS: 3 days | Discharge: HOME HEALTH | DRG: 699 | End: 2021-06-22
Attending: EMERGENCY MEDICINE | Admitting: INTERNAL MEDICINE
Payer: MEDICARE

## 2021-06-18 ENCOUNTER — APPOINTMENT (OUTPATIENT)
Dept: CT IMAGING | Facility: CLINIC | Age: 66
DRG: 699 | End: 2021-06-18
Attending: EMERGENCY MEDICINE
Payer: MEDICARE

## 2021-06-18 DIAGNOSIS — M54.9 INTRACTABLE BACK PAIN: ICD-10-CM

## 2021-06-18 DIAGNOSIS — Z94.0 H/O KIDNEY TRANSPLANT: ICD-10-CM

## 2021-06-18 DIAGNOSIS — N39.0 URINARY TRACT INFECTION WITH HEMATURIA, SITE UNSPECIFIED: ICD-10-CM

## 2021-06-18 DIAGNOSIS — R31.9 URINARY TRACT INFECTION WITH HEMATURIA, SITE UNSPECIFIED: ICD-10-CM

## 2021-06-18 DIAGNOSIS — R78.81 E COLI BACTEREMIA: Primary | ICD-10-CM

## 2021-06-18 DIAGNOSIS — B96.20 E COLI BACTEREMIA: Primary | ICD-10-CM

## 2021-06-18 LAB
ALBUMIN SERPL-MCNC: 2.8 G/DL (ref 3.4–5)
ALBUMIN UR-MCNC: 20 MG/DL
ALP SERPL-CCNC: 94 U/L (ref 40–150)
ALT SERPL W P-5'-P-CCNC: 51 U/L (ref 0–50)
ANION GAP SERPL CALCULATED.3IONS-SCNC: 8 MMOL/L (ref 3–14)
APPEARANCE UR: ABNORMAL
AST SERPL W P-5'-P-CCNC: 21 U/L (ref 0–45)
BACTERIA #/AREA URNS HPF: ABNORMAL /HPF
BASOPHILS # BLD AUTO: 0 10E9/L (ref 0–0.2)
BASOPHILS NFR BLD AUTO: 0.1 %
BILIRUB SERPL-MCNC: 1 MG/DL (ref 0.2–1.3)
BILIRUB UR QL STRIP: NEGATIVE
BUN SERPL-MCNC: 22 MG/DL (ref 7–30)
CALCIUM SERPL-MCNC: 9.2 MG/DL (ref 8.5–10.1)
CHLORIDE SERPL-SCNC: 111 MMOL/L (ref 94–109)
CO2 SERPL-SCNC: 20 MMOL/L (ref 20–32)
COLOR UR AUTO: YELLOW
CREAT SERPL-MCNC: 1.03 MG/DL (ref 0.52–1.04)
CRP SERPL-MCNC: 220 MG/L (ref 0–8)
DIFFERENTIAL METHOD BLD: ABNORMAL
EOSINOPHIL # BLD AUTO: 0 10E9/L (ref 0–0.7)
EOSINOPHIL NFR BLD AUTO: 0 %
ERYTHROCYTE [DISTWIDTH] IN BLOOD BY AUTOMATED COUNT: 13.8 % (ref 10–15)
GFR SERPL CREATININE-BSD FRML MDRD: 57 ML/MIN/{1.73_M2}
GLUCOSE BLDC GLUCOMTR-MCNC: 172 MG/DL (ref 70–99)
GLUCOSE BLDC GLUCOMTR-MCNC: 203 MG/DL (ref 70–99)
GLUCOSE SERPL-MCNC: 161 MG/DL (ref 70–99)
GLUCOSE UR STRIP-MCNC: NEGATIVE MG/DL
HCT VFR BLD AUTO: 40.4 % (ref 35–47)
HGB BLD-MCNC: 13 G/DL (ref 11.7–15.7)
HGB UR QL STRIP: NEGATIVE
HYALINE CASTS #/AREA URNS LPF: 3 /LPF (ref 0–2)
IMM GRANULOCYTES # BLD: 0 10E9/L (ref 0–0.4)
IMM GRANULOCYTES NFR BLD: 0.6 %
KETONES UR STRIP-MCNC: 5 MG/DL
LABORATORY COMMENT REPORT: NORMAL
LEUKOCYTE ESTERASE UR QL STRIP: ABNORMAL
LYMPHOCYTES # BLD AUTO: 0.2 10E9/L (ref 0.8–5.3)
LYMPHOCYTES NFR BLD AUTO: 3.4 %
MCH RBC QN AUTO: 28.8 PG (ref 26.5–33)
MCHC RBC AUTO-ENTMCNC: 32.2 G/DL (ref 31.5–36.5)
MCV RBC AUTO: 90 FL (ref 78–100)
MONOCYTES # BLD AUTO: 0.4 10E9/L (ref 0–1.3)
MONOCYTES NFR BLD AUTO: 5.9 %
NEUTROPHILS # BLD AUTO: 6.1 10E9/L (ref 1.6–8.3)
NEUTROPHILS NFR BLD AUTO: 90 %
NITRATE UR QL: NEGATIVE
NRBC # BLD AUTO: 0 10*3/UL
NRBC BLD AUTO-RTO: 0 /100
PH UR STRIP: 6 PH (ref 5–7)
PLATELET # BLD AUTO: 176 10E9/L (ref 150–450)
POTASSIUM SERPL-SCNC: 4.2 MMOL/L (ref 3.4–5.3)
PROT SERPL-MCNC: 6.8 G/DL (ref 6.8–8.8)
RBC # BLD AUTO: 4.51 10E12/L (ref 3.8–5.2)
RBC #/AREA URNS AUTO: 3 /HPF (ref 0–2)
SARS-COV-2 RNA RESP QL NAA+PROBE: NEGATIVE
SODIUM SERPL-SCNC: 139 MMOL/L (ref 133–144)
SOURCE: ABNORMAL
SP GR UR STRIP: 1.03 (ref 1–1.03)
SPECIMEN SOURCE: NORMAL
SQUAMOUS #/AREA URNS AUTO: 1 /HPF (ref 0–1)
UROBILINOGEN UR STRIP-MCNC: 2 MG/DL (ref 0–2)
WBC # BLD AUTO: 6.8 10E9/L (ref 4–11)
WBC #/AREA URNS AUTO: >182 /HPF (ref 0–5)

## 2021-06-18 PROCEDURE — G0378 HOSPITAL OBSERVATION PER HR: HCPCS

## 2021-06-18 PROCEDURE — 87077 CULTURE AEROBIC IDENTIFY: CPT | Performed by: EMERGENCY MEDICINE

## 2021-06-18 PROCEDURE — 87086 URINE CULTURE/COLONY COUNT: CPT | Performed by: EMERGENCY MEDICINE

## 2021-06-18 PROCEDURE — 87088 URINE BACTERIA CULTURE: CPT | Performed by: EMERGENCY MEDICINE

## 2021-06-18 PROCEDURE — 87635 SARS-COV-2 COVID-19 AMP PRB: CPT | Performed by: EMERGENCY MEDICINE

## 2021-06-18 PROCEDURE — 250N000011 HC RX IP 250 OP 636: Performed by: EMERGENCY MEDICINE

## 2021-06-18 PROCEDURE — 250N000013 HC RX MED GY IP 250 OP 250 PS 637: Performed by: INTERNAL MEDICINE

## 2021-06-18 PROCEDURE — 96374 THER/PROPH/DIAG INJ IV PUSH: CPT

## 2021-06-18 PROCEDURE — 99223 1ST HOSP IP/OBS HIGH 75: CPT | Mod: AI | Performed by: INTERNAL MEDICINE

## 2021-06-18 PROCEDURE — 87040 BLOOD CULTURE FOR BACTERIA: CPT | Performed by: EMERGENCY MEDICINE

## 2021-06-18 PROCEDURE — 258N000003 HC RX IP 258 OP 636: Performed by: INTERNAL MEDICINE

## 2021-06-18 PROCEDURE — 250N000012 HC RX MED GY IP 250 OP 636 PS 637: Performed by: EMERGENCY MEDICINE

## 2021-06-18 PROCEDURE — C9803 HOPD COVID-19 SPEC COLLECT: HCPCS

## 2021-06-18 PROCEDURE — 250N000011 HC RX IP 250 OP 636: Performed by: INTERNAL MEDICINE

## 2021-06-18 PROCEDURE — 96376 TX/PRO/DX INJ SAME DRUG ADON: CPT

## 2021-06-18 PROCEDURE — 87186 SC STD MICRODIL/AGAR DIL: CPT | Performed by: EMERGENCY MEDICINE

## 2021-06-18 PROCEDURE — 99285 EMERGENCY DEPT VISIT HI MDM: CPT | Mod: 25

## 2021-06-18 PROCEDURE — 999N001017 HC STATISTIC GLUCOSE BY METER IP

## 2021-06-18 PROCEDURE — 86140 C-REACTIVE PROTEIN: CPT | Performed by: EMERGENCY MEDICINE

## 2021-06-18 PROCEDURE — 85025 COMPLETE CBC W/AUTO DIFF WBC: CPT | Performed by: EMERGENCY MEDICINE

## 2021-06-18 PROCEDURE — 81001 URINALYSIS AUTO W/SCOPE: CPT | Performed by: EMERGENCY MEDICINE

## 2021-06-18 PROCEDURE — 80053 COMPREHEN METABOLIC PANEL: CPT | Performed by: EMERGENCY MEDICINE

## 2021-06-18 PROCEDURE — 87800 DETECT AGNT MULT DNA DIREC: CPT | Performed by: EMERGENCY MEDICINE

## 2021-06-18 PROCEDURE — G1004 CDSM NDSC: HCPCS

## 2021-06-18 RX ORDER — POLYETHYLENE GLYCOL 3350 17 G/17G
17 POWDER, FOR SOLUTION ORAL DAILY PRN
Status: DISCONTINUED | OUTPATIENT
Start: 2021-06-18 | End: 2021-06-22 | Stop reason: HOSPADM

## 2021-06-18 RX ORDER — ONDANSETRON 2 MG/ML
4 INJECTION INTRAMUSCULAR; INTRAVENOUS EVERY 6 HOURS PRN
Status: DISCONTINUED | OUTPATIENT
Start: 2021-06-18 | End: 2021-06-22 | Stop reason: HOSPADM

## 2021-06-18 RX ORDER — CEFTRIAXONE 2 G/1
2 INJECTION, POWDER, FOR SOLUTION INTRAMUSCULAR; INTRAVENOUS EVERY 24 HOURS
Status: DISCONTINUED | OUTPATIENT
Start: 2021-06-18 | End: 2021-06-22 | Stop reason: HOSPADM

## 2021-06-18 RX ORDER — MORPHINE SULFATE 4 MG/ML
4 INJECTION, SOLUTION INTRAMUSCULAR; INTRAVENOUS ONCE
Status: COMPLETED | OUTPATIENT
Start: 2021-06-18 | End: 2021-06-18

## 2021-06-18 RX ORDER — LIDOCAINE 40 MG/G
CREAM TOPICAL
Status: DISCONTINUED | OUTPATIENT
Start: 2021-06-18 | End: 2021-06-22 | Stop reason: HOSPADM

## 2021-06-18 RX ORDER — AMOXICILLIN 250 MG
2 CAPSULE ORAL 2 TIMES DAILY
Status: DISCONTINUED | OUTPATIENT
Start: 2021-06-18 | End: 2021-06-22 | Stop reason: HOSPADM

## 2021-06-18 RX ORDER — ACETAMINOPHEN 325 MG/1
650 TABLET ORAL EVERY 4 HOURS PRN
Status: DISCONTINUED | OUTPATIENT
Start: 2021-06-18 | End: 2021-06-22 | Stop reason: HOSPADM

## 2021-06-18 RX ORDER — ONDANSETRON 4 MG/1
4 TABLET, ORALLY DISINTEGRATING ORAL EVERY 6 HOURS PRN
Status: DISCONTINUED | OUTPATIENT
Start: 2021-06-18 | End: 2021-06-22 | Stop reason: HOSPADM

## 2021-06-18 RX ORDER — AMOXICILLIN 250 MG
1 CAPSULE ORAL 2 TIMES DAILY
Status: DISCONTINUED | OUTPATIENT
Start: 2021-06-18 | End: 2021-06-22 | Stop reason: HOSPADM

## 2021-06-18 RX ORDER — SODIUM CHLORIDE 9 MG/ML
INJECTION, SOLUTION INTRAVENOUS CONTINUOUS
Status: ACTIVE | OUTPATIENT
Start: 2021-06-18 | End: 2021-06-19

## 2021-06-18 RX ORDER — ACETAMINOPHEN 500 MG
1500 TABLET ORAL EVERY 6 HOURS PRN
COMMUNITY

## 2021-06-18 RX ORDER — HYDROCODONE BITARTRATE AND ACETAMINOPHEN 5; 325 MG/1; MG/1
1-2 TABLET ORAL EVERY 4 HOURS PRN
Status: DISCONTINUED | OUTPATIENT
Start: 2021-06-18 | End: 2021-06-20

## 2021-06-18 RX ORDER — CEFTRIAXONE 2 G/1
2 INJECTION, POWDER, FOR SOLUTION INTRAMUSCULAR; INTRAVENOUS ONCE
Status: DISCONTINUED | OUTPATIENT
Start: 2021-06-18 | End: 2021-06-18

## 2021-06-18 RX ADMIN — HYDROCODONE BITARTRATE AND ACETAMINOPHEN 2 TABLET: 5; 325 TABLET ORAL at 20:28

## 2021-06-18 RX ADMIN — CEFTRIAXONE SODIUM 2 G: 2 INJECTION, POWDER, FOR SOLUTION INTRAMUSCULAR; INTRAVENOUS at 16:33

## 2021-06-18 RX ADMIN — MORPHINE SULFATE 4 MG: 4 INJECTION, SOLUTION INTRAMUSCULAR; INTRAVENOUS at 11:32

## 2021-06-18 RX ADMIN — SENNOSIDES AND DOCUSATE SODIUM 1 TABLET: 8.6; 5 TABLET ORAL at 20:28

## 2021-06-18 RX ADMIN — CYCLOSPORINE 125 MG: 100 CAPSULE, LIQUID FILLED ORAL at 11:56

## 2021-06-18 RX ADMIN — SODIUM CHLORIDE: 9 INJECTION, SOLUTION INTRAVENOUS at 16:32

## 2021-06-18 RX ADMIN — HYDROCODONE BITARTRATE AND ACETAMINOPHEN 2 TABLET: 5; 325 TABLET ORAL at 16:33

## 2021-06-18 ASSESSMENT — ENCOUNTER SYMPTOMS
NAUSEA: 0
DYSURIA: 1
CHILLS: 0
SHORTNESS OF BREATH: 0
BACK PAIN: 1
VOMITING: 0
FEVER: 0

## 2021-06-18 ASSESSMENT — MIFFLIN-ST. JEOR: SCORE: 1356.48

## 2021-06-18 NOTE — ED TRIAGE NOTES
Back pain for 10 days was here Tuesday - given pain meds- last 2 days has not been abke to take  Are of herself due to immobility   No falls, fever, vomiting bp 98/68  Hr 60's  98% ra  20 lLac  250 ns  Last bp 104/48 bg 153

## 2021-06-18 NOTE — H&P
[facility]    History and Physical  Hospitalist       Date of Admission:  6/18/2021    Assessment & Plan   65 year old female with past medical hx of PCKD with renal transplant and DM type 2, who presents with back pain:     Summary:    Principal Problem:    Intractable back pain -- pain is midline and appears clinically like musculoskeletal pain, but could have infection with possible pyelonephritis, although exam not suggesting flank tenderness, more midline spine tenderness   -- Pain meds, PT eval, avoid NSAID's because of renal transplant      Degenerative Disc Desease L5-S1 -- could be responsible for some pain, but tender over lower thoracic and Lumbar area, so this does not explain it all       Elevated CRP -- ?related to UTI or early pyelo?, can not exclude rheumatologic problem, but no better with 1 day of Pred 40 mg     UTI -- suggest by UA with >182 WBC's   -- check blood and urine cultures, and start Ceftriaxone    Active Problems:    Hypertension      DM type 2 with Neuropathy, Hgb A1C 7.3 on 1/25/21   -- follow glucometer, insuline as needed       PCKD, S/P Renal Transplant 2014 ([facility])   -- continue transplant medications       Plan: as above    DVT Prophylaxis: Pneumatic Compression Devices  Code Status: Full Code    Disposition: Expected discharge in 2-3 days    Javier Blunt  Pager: [phone]  Cell Phone:  [phone]     Primary Care Physician   Horacio Sheridan    Chief Complaint   Back Pain     History is obtained from Patient    History of Present Illness   65 year old female with PCKD with renal transplant and now Stage 3 CKD, DM type 2, depression PTSD, hypertension, hyperlipidemia, and obesity -- who presents for evaluation of back pain. The patient has had gradual onset of low back pain over the past 2 weeks with no known injury. This pain radiates down the back of both legs as well as her arms. She presented here with similar symptoms two days ago and  was treated with Norco, and her CRP was 232 and started on Prednisone 40 mg dailly for 5 days for suspected inflammation and suggested to see a rheumatologist in next 3 days.   hospitalized yesterday for an infection, and she no longer can get up by herself.  She does have dysuria, has hx of UTI's, does have bilateral back pain -- right side worse than left, but most pain midline and worse with movement.  No fever or chills, no cough or URI symptoms, and she has received both doses of the COVID vaccine.     In ER, AVSS, WBC 6.8, , UA with > 182 WBC's and Nitrite negative.   (last UTI in 2018 and was E. Coli sensitive to everything except Bactrim).      PAST MEDICAL HISTORY    Past Medical History:   Diagnosis Date     Abnormal MRI, cervical spine 10/15/2011    2011; mild changes noted. Study done for left arm symptoms Impression:  1. Mild multilevel degenerative disc disease with no significant canal or neural stenosis seen. motion artifact on the STIR images in these are not interpretable. The remaining images were interpreted      Autosomal dominant polycystic kidney disease 2011     (Problem list name updated by automated process. Provider to review and confirm.)     [facility] DJD(carpometacarpal degenerative joint disease), localized primary 2013     -donor kidney transplant 2014     Gastroesophageal reflux disease      Generalized anxiety disorder 11/15/2012     Glaucoma      Hyperlipidemia 10/15/2011     Hyperparathyroidism, secondary (H) 2015     Hypertension     resolved     Immunosuppressed status (H) 2014     Major depressive disorder, recurrent episode, moderate (H) 11/15/2012     Obesity (BMI 30-39.9)      OP (osteoporosis) T score -3.8 2009 T-score -3.7      LION (obstructive sleep apnoea) 10/15/2012    intol to cpa     Pain in joint, forearm -- L unhealed Fx 2013     Premature menopause age 37 07/10/2012    OCP (vaginal bldg)-->HT which  she stopped 2 mo later documented at 2007 visit (age 51).      Restless leg syndrome      Stiffness of joint, not elsewhere classified, hand 2013     Tremor 10/15/2011    head     Type 2 DM with Neuropathy     started with gestational diabetes     Uncomplicated asthma         PAST SURGICAL HISTORY    Past Surgical History:   Procedure Laterality Date     ABDOMEN SURGERY       ANKLE SURGERY       C TRANSPLANTATION OF KIDNEY  2014     C/SECTION, LOW TRANSVERSE      x 2     CHOLECYSTECTOMY       COLONOSCOPY       ESOPHAGOSCOPY, GASTROSCOPY, DUODENOSCOPY (EGD), COMBINED N/A 2015    Procedure: COMBINED ESOPHAGOSCOPY, GASTROSCOPY, DUODENOSCOPY (EGD);  Surgeon: Sky Davey MD;  Location:  GI     ESOPHAGOSCOPY, GASTROSCOPY, DUODENOSCOPY (EGD), COMBINED N/A 2015    Procedure: COMBINED ESOPHAGOSCOPY, GASTROSCOPY, DUODENOSCOPY (EGD), BIOPSY SINGLE OR MULTIPLE;  Surgeon: Sky Davey MD;  Location:  GI     EYE SURGERY       LAPAROSCOPY, SURGICAL; REPAIR INCISIONAL OR VENTRAL HERNIA       LASER SURGERY OF EYE Left 10/01/2020    sever vitreous strands     ORTHOPEDIC SURGERY       HERMINIA EN Y BOWEL       WRIST SURGERY          Prior to Admission Medications   Prior to Admission Medications   Prescriptions Last Dose Informant Patient Reported? Taking?   ARIPiprazole (ABILIFY) 2 MG tablet   No No   Sig: Take 1.5 tablets (3 mg) by mouth daily   Cholecalciferol (VITAMIN D) 1000 UNITS capsule   Yes No   Si,000 Units    Polyvinyl Alcohol-Povidone (REFRESH OP)   Yes No   Sig: Apply to eye as needed Both eyes   Vaginal Lubricant (REPLENS) GEL   No No   Sig: Use vaginally as needed. Can use up to 3 times per week.   acetaminophen (TYLENOL) 500 MG tablet   Yes Yes   Sig: Take 1,500 mg by mouth every 6 hours as needed for mild pain   acetylcysteine (N-ACETYL-L-CYSTEINE) 600 MG CAPS capsule   Yes No   Si mg 1 cap daily   albuterol (PROAIR HFA/PROVENTIL HFA/VENTOLIN HFA)  108 (90 Base) MCG/ACT inhaler   No No   Sig: Inhale 2 puffs into the lungs every 6 hours as needed for shortness of breath / dyspnea or wheezing   aspirin EC 81 MG EC tablet   No No   Sig: Take 1 tablet (81 mg) by mouth daily   blood glucose monitoring (ACCU-CHEK RALPH PLUS) meter device kit   Yes No   blood glucose monitoring (NO BRAND SPECIFIED) meter device kit   No No   Sig: Use to test blood sugar 2 times daily or as directed.   blood glucose monitoring (NO BRAND SPECIFIED) test strip   No No   Sig: Use to test blood sugars 2 times daily or as directed   blood glucose monitoring (SOFTCLIX) lancets   No No   Sig: Use to test blood sugar 2 times daily or as directed.   cyanocolbalamin (VITAMIN  B-12) 1000 MCG tablet   No No   Sig: Take 1 tablet by mouth daily.   cycloSPORINE modified (GENERIC EQUIVALENT) 25 MG capsule   No No   Sig: Take 5 capsules (125 mg) by mouth 2 times daily TAKE 5 CAPSULES (125MG) BY MOUTH TWO TIMES A DAY   econazole nitrate 1 % cream   No No   Sig: Apply topically daily   estradiol (VAGIFEM) 10 MCG TABS vaginal tablet   No No   Sig: Place 1 tablet (10 mcg) vaginally twice a week   ferrous sulfate (FEROSUL) 325 (65 Fe) MG tablet   No No   Sig: Take 1 tablet (325 mg) by mouth daily (with breakfast)   fluticasone (FLONASE) 50 MCG/ACT nasal spray   No No   Sig: Spray 1 spray into both nostrils daily   furosemide (LASIX) 40 MG tablet   No No   Sig: Take 1 tablet (40 mg) by mouth daily   gabapentin (NEURONTIN) 300 MG capsule   No No   Sig: Take 2 capsules (600 mg) by mouth At Bedtime   latanoprost (XALATAN) 0.005 % ophthalmic solution   No No   Sig: Place 1 drop into both eyes At Bedtime   metFORMIN (GLUCOPHAGE-XR) 500 MG 24 hr tablet   No No   Sig: Take 3 tablets (1,500 mg) by mouth daily (with dinner)   metolazone (ZAROXOLYN) 5 MG tablet   No No   Sig: TAKE 1 TABLET BY MOUTH DAILY AS NEEDED(WHILE WEIGHT IS ABOVE 190 POUNDS)   mupirocin (BACTROBAN) 2 % external ointment   No No   Sig: Apply  topically 3 times daily   mycophenolate (GENERIC EQUIVALENT) 250 MG capsule   No No   Sig: Take 4 capsules (1,000 mg) by mouth 2 times daily   nystatin (MYCOSTATIN) 622218 UNIT/GM external cream   No No   Sig: Apply topically 2 times daily To toenails.   nystatin-triamcinolone (MYCOLOG II) 854272-9.1 UNIT/GM-% external cream   No No   Sig: Apply topically 2 times daily To right big toe and toenail.   omeprazole (PRILOSEC) 40 MG DR capsule   No No   Sig: Take 1 capsule (40 mg) by mouth daily   ondansetron (ZOFRAN-ODT) 4 MG ODT tab   No No   Sig: Take 1 tablet (4 mg) by mouth every 6 hours as needed for nausea   order for DME   No No   Sig: Walker with front wheels and a seat.   order for DME   No No   Sig: Equipment being ordered: DME  [id number]   Ankle support, sm, fig 8, lace up   prazosin (MINIPRESS) 2 MG capsule   No No   Sig: TAKE 1 CAPSULE ALONG WITH THREE 5MG CAPSULES(15MG) EVERY NIGHT AT BEDTIME FOR A TOTAL DAILY DOSE OF 17MG   prazosin (MINIPRESS) 5 MG capsule   No No   Sig: Take 3 x 5mg (15mg) caps + 1 x 2mg caps at bedtime (total dose=17mg)   predniSONE (DELTASONE) 20 MG tablet   No No   Sig: Take two tablets (= 40mg) each day for 5 (five) days   simvastatin (ZOCOR) 20 MG tablet   No No   Sig: Take 1 tablet (20 mg) by mouth At Bedtime   sulfamethoxazole-trimethoprim (BACTRIM) 400-80 MG tablet   No No   Sig: Take 1 tablet by mouth daily   topiramate (TOPAMAX) 200 MG tablet   No No   Sig: TAKE 1 TABLET(200 MG) BY MOUTH TWICE DAILY   vilazodone (VIIBRYD) 40 MG TABS tablet   No No   Sig: Take 1 tablet (40 mg) by mouth daily      Facility-Administered Medications: None     Allergies   Allergies   Allergen Reactions     Percocet [Oxycodone-Acetaminophen] Nausea and Vomiting     Novocain [Procaine Hcl] Hives     Had reaction 25 years ago to old renetta. Pt reports multiple injections of lidocaine since then without reaction.  Tolerated lidocaine injection today without difficulty.  Osmar Mark MD IR  Service.       SOCIAL HISTORY    Social History     Social History Narrative    , 2 children, works as a teacher.  (last updated 6/18/2021)       Social History     Tobacco Use     Smoking status: Never Smoker     Smokeless tobacco: Never Used   Substance Use Topics     Alcohol use: No     Alcohol/week: 0.0 standard drinks     Drug use: No        FAMILY HISTORY    Family History   Problem Relation Age of Onset     Mental Illness Other         family hx     Heart Disease Other      Diabetes Other      Cancer Other      Genetic Disorder Father      Mental Illness Father      Diabetes Father      Hypertension Father      Hyperlipidemia Mother      Diabetes Mother      Hypertension Mother      Mental Illness Other      Diabetes Other      Glaucoma No family hx of      Macular Degeneration No family hx of         Review of Systems   The 10 point Review of Systems is negative other than noted in the HPI or here.       PHYSICAL EXAM     Temp: 97.5  F (36.4  C) Temp src: Temporal BP: 126/68 Pulse: 69   Resp: 18 SpO2: 94 % O2 Device: None (Room air)    Vital Signs with Ranges  Temp:  [97.5  F (36.4  C)] 97.5  F (36.4  C)  Pulse:  [67-71] 69  Resp:  [18] 18  BP: (103-130)/(66-89) 126/68  SpO2:  [94 %-98 %] 94 %  194 lbs 0 oz    Constitutional: Awake, alert, cooperative, uncomfortable with trying to sit up  Eyes: Conjunctiva and pupils examined and normal.  HEENT: Moist mucous membranes, normal dentition.  Respiratory: Clear to auscultation bilaterally, no crackles or wheezing.  Cardiovascular: Regular rate and rhythm, normal S1 and S2, and no murmur noted, no carotid bruits.  No ankle edema.   GI: Soft, non-distended, non-tender, normal bowel sounds.  And no tenderness over RLQ area where transplanted kidney is.   Back: no flank pain to percussion but midline tenderness over thoracic and lumbar spine which reproduces pain  Lymph/Hematologic: No anterior cervical, supraclavicular or axillary adenopathy.  Skin: No  rashes, no cyanosis.   Musculoskeletal: No joint swelling, erythema or tenderness.  Neurologic: Alert, Ox3, Cranial nerves 2-12 intact, no focal weakness but decreased light touch in feet and finger tips.   Psychiatric:  No obvious anxiety or depression.    Data   Data reviewed today:  I personally reviewed no images or EKG's today.  Recent Labs   Lab 06/18/21  0957 06/16/21  2145 06/15/21  1527   WBC 6.8 7.4 5.7   HGB 13.0 13.7 13.2   MCV 90 89 90    177 144*    136 137   POTASSIUM 4.2 3.8 3.7   CHLORIDE 111* 105 107   CO2 20 20 24   BUN 22 19 20   CR 1.03 1.24* 1.22*   ANIONGAP 8 11 6   MARY 9.2 9.8 9.1   * 203* 193*   ALBUMIN 2.8*  --  3.3*   PROTTOTAL 6.8  --  6.8   BILITOTAL 1.0  --  0.8   ALKPHOS 94  --  53   ALT 51*  --  22   AST 21  --  8       Imaging:  Recent Results (from the past 24 hour(s))   Lumbar spine CT w/o contrast    Narrative    CT LUMBAR SPINE WITHOUT CONTRAST  6/18/2021 12:48 PM     HISTORY: Low back pain, increased fracture risk.     TECHNIQUE: Axial images of the lumbar spine were obtained without  intravenous contrast. Multiplanar reformations were performed.   Radiation dose for this scan was reduced using automated exposure  control, adjustment of the mA and/or kV according to patient size, or  iterative reconstruction technique.     COMPARISON: Lumbar spine radiographs 5/16/2012.     FINDINGS:  Nomenclature is based on five lumbar vertebral bodies. No  acute fracture. Normal vertebral body heights. Small chronic-appearing  Schmorl's nodes at the L3-L4 and L5-S1 levels, with tiny Schmorl's  nodes/degenerative endplate irregularities elsewhere. Minimal apparent  levoconvex curvature centered at L4-L5. Alignment is otherwise normal.    Moderate to severe disc height loss at L5-S1 with vacuum disc  phenomenon and marginal endplate osteophytes. Minimal disc height loss  elsewhere in the lumbar spine. Mild to moderate left and mild right  L5-S1 degenerative facet joint  arthropathy. No evidence for high-grade  spinal canal stenosis. There is moderate-appearing left and mild to  moderate-appearing right L5-S1 neural foraminal stenosis and mild  bilateral L4-L5 neural foraminal stenosis. No significant neural  foraminal narrowing elsewhere.    There appear to be at least small bilateral pleural effusions, as seen  on prior CT. Innumerable cysts throughout the visualized aspects of  the kidneys with scattered small/punctate areas of calcification as  well as multiple partially visualized cystic lesions in the liver, in  keeping with the patient's history of polycystic kidney disease.  Low-density ovoid left adrenal mass measuring 2.9 cm in the axial  plane, in keeping with the patient's history of lipid-rich adrenal  adenoma. Scattered atherosclerotic calcifications are noted.      Impression    IMPRESSION:  1. No acute osseous abnormality identified.  2. Multilevel degenerative changes of the lumbar spine, most  pronounced at L5-S1. Please see the body of the report for details.  3. Partially visualized findings in keeping with the patient's history  of polycystic kidney disease.  4. Unchanged low-density ovoid left adrenal mass, compatible with an  adrenal adenoma.

## 2021-06-18 NOTE — ED PROVIDER NOTES
"History     Chief Complaint:  Back Pain       The history is provided by the patient.     Elizabeth Palma is a 65 year old female with a history of diabetes mellitus - type 2, depression, CKD, PTSD, hypertension, hyperlipidemia, and obesity who presents for evaluation of back pain. The patient has had gradual onset of low back pain over the past ten days without an inciting incident. This pain radiates down the back of both legs as well as her arms. She presented here with similar symptoms two days ago and was treated with Norco in the ED which greatly helped her symptoms. She was discharged with a course of prednisone, of which she has taken one dose. It was also recommended she follow up with rheumatology and has an appointment scheduled in three days. Despite this, her pain continues to be severe and she is unable to \"take her medications or go to the bathroom\" without the help of her  as he was admitted to this hospital yesterday.      Here, she further notes some burning dysuria which feels similar to previous UTIs. She also has tingling in her arms, but denies fever, chills, nausea, vomiting, chest pain, or shortness of breath, and has never previously dealt with back pain. She has received both doses of the COVID vaccine.       Review of Systems   Constitutional: Negative for chills and fever.   Respiratory: Negative for shortness of breath.    Cardiovascular: Negative for chest pain.   Gastrointestinal: Negative for nausea and vomiting.   Genitourinary: Positive for dysuria.   Musculoskeletal: Positive for back pain.   Neurological:        Positive for tingling in arms   All other systems reviewed and are negative.    Allergies:  Percocet  Novocain  Iodinated contrast media     Medications:   Acetylcysteine  Albuterol inhaler  Aspirin 81 mg  Abilify   Cyclosporine  Ferrous sulfate  Estradiol   Metformin   Gabapentin  Fluticasone  Lasix  Metolazone  Mycophenolate  Omeprazole  Zofran "   Prazosin  Prednisone  Simvastatin   Topiramate  Vilazodone   Robaxin     Medical History:   Autosomal dominant polycystic kidney disease  Degenerative joint disease   Depression   Diabetes mellitus - type 2   GERD  Anxiety   Glaucoma  Hyperlipidemia   Hyperparathyroidism   Hypertension   Obesity   Osteoporosis  Obstructive sleep apnea   Sensory loss - bottom of feet  Tremor   Asthma  Suicidal ideation   CKD - stage III  PTSD  Narcissistic personality disorder   Restless legs  Insomnia   UTI  Posterior vitreous detachment     Surgical History:  Abdomen surgery   Ankle surgery   Kidney transplant  C section x2  Cholecystectomy   Colonoscopy   EGD, combined x2  Cataract removal with IOL  Incisional hernia repair  Ankle surgery   Vinnie En Y gastric bypass   Wrist surgery     Family History:   Father -  Mental illness, diabetes, hypertension  Mother -  Diabetes, hyperlipidemia, hypertension     Social History:  The patient was accompanied to the ED by EMS.  Marital Status:   PCP: Horacio Sheridan        Physical Exam     Physical Exam    Patient Vitals for the past 24 hrs:   BP Temp Temp src Pulse Resp SpO2 Height Weight   06/18/21 1130 126/68 -- -- 69 -- 97 % -- --   06/18/21 1100 130/79 -- -- 67 -- 98 % -- --   06/18/21 1030 104/66 -- -- 67 -- 98 % -- --   06/18/21 1000 103/89 -- -- 71 -- 97 % -- --   06/18/21 0944 129/74 97.5  F (36.4  C) Temporal 69 18 96 % 1.524 m (5') 88 kg (194 lb)     General: Alert and Interactive.   Head: No signs of trauma.   Mouth/Throat: Oropharynx is clear and moist.   Eyes: Conjunctivae are normal. Pupils are equal, round, and reactive to light.   Neck: Normal range of motion. No nuchal rigidity.   CV: Normal rate and regular rhythm.    Resp: Effort normal and breath sounds normal. No respiratory distress.   GI: Soft. There is no tenderness or guarding.   MSK: Normal range of motion. no edema. Back pain but is moving all four extremities.   Neuro: The patient is alert and oriented to  person, place, and time.  PERRLA, EOMI, strength in upper/lower extremities normal and symmetrical.   Sensation normal. Speech normal.  GCS eye subscore is 4. GCS verbal subscore is 5. GCS motor subscore is 6.   Skin: Skin is warm and dry. No rash noted. Transplanted kidney has well healed surgical incision over the right abdomen.   Psych: normal mood and affect. behavior is normal.      Emergency Department Course     Imaging:    Lumbar Spine CT w/o Contrast:   IMPRESSION:   1. No acute osseous abnormality identified.   2. Multilevel degenerative changes of the lumbar spine, most   pronounced at L5-S1. Please see the body of the report for details.   3. Partially visualized findings in keeping with the patient's history   of polycystic kidney disease.   4. Unchanged low-density ovoid left adrenal mass, compatible with an   adrenal adenoma.  Reading per radiology.     Laboratory:    CBC: WBC 6.8, HGB 13.0,   CMP: Chloride 111 (H), Glucose 161 (H), GFR 57 (L), Albumin 2.8 (L), ALT 51 (H), o/w WNL (Creatinine 1.03)   CRP: 220.0 (H)    Blood Culture x2: Pending     UA with Microscopic: Ketones 5 (A), Protein Albumin 20 (A), Leukocyte Esterase Large (A), RBC/HPF 3 (H), WBC/HPF >182 (H), Bacteria Few (A), Hyaline Casts 3 (H), o/w WNL   Urine Culture: Pending    Asymptomatic COVID-19 (Coronavirus) PCR by Nasopharyngeal Swab: Negative     Emergency Department Course:    Reviewed:  1038 I reviewed the patient's nursing notes, vitals, past medical records, Care Everywhere.     Assessments:  1051 I obtained history and performed an exam of the patient, as documented above.   1321 I rechecked the patient and updated her on imaging and laboratory results. Discussed the plan for admission and she is in agreement with this plan.     Consults:   1338 I spoke with Dr. Glover of the hospitalist service regarding patient's presentation, findings, and plan of care. They are in agreement to accept the patient for admission to a  bed for further monitoring, care, and treatment.      Interventions:  1132 Morphine 4 mg IV   1156 Cyclosporine 125 mg PO    Disposition:  The patient was admitted to the hospital under the care of Dr. Glover.     Impression & Plan   Medical Decision Making:  Elizabeth Palma is a 65 year old female who presents for evaluation of back pain and radicular symptoms. She has a history of back pain in the past.  Her pain has improved with interventions in the emergency department. She did not sustain any trauma, therefore x-rays are not necessary due to the low likelihood of fracture or subluxation.   Advanced imaging with CT is indicated because the patient is immunocompromised.      There is no clinical evidence of cauda equina syndrome, discitis, spinal/epidural space hematoma or epidural abscess or other emergently worrisome etiology. The neurological exam is normal.   However because she is unable to care for herself secondary to the back pain, she will need to be admitted to the hospital for further evaluation and treatment.    Covid-19  Elizabeth Palma was evaluated during a global COVID-19 pandemic, which necessitated consideration that the patient might be at risk for infection with the SARS-CoV-2 virus that causes COVID-19.   Applicable protocols for evaluation were followed during the patient's care.   COVID-19 was considered as part of the patient's evaluation. The plan for testing is:  a test was obtained during this visit.    Diagnosis:     ICD-10-CM    1. Intractable back pain  M54.9    2. H/O kidney transplant  Z94.0    3. Urinary tract infection with hematuria, site unspecified  N39.0     R31.9         Scribe Disclosure:  Amina SCRUGGS, am serving as a scribe at 9:31 AM on 6/18/2021 to document services personally performed by Sonu Grant MD based on my observations and the provider's statements to me.      Sonu Grant MD  06/18/21 7276

## 2021-06-18 NOTE — ED NOTES
[facility]  ED Nurse Handoff Report    ED Chief complaint: Back Pain      ED Diagnosis:   Final diagnoses:   None       Code Status: to be discussed with hospitalist     Allergies:   Allergies   Allergen Reactions     Percocet [Oxycodone-Acetaminophen] Nausea and Vomiting     Novocain [Procaine Hcl] Hives     Had reaction 25 years ago to old renetta. Pt reports multiple injections of lidocaine since then without reaction.  Tolerated lidocaine injection today without difficulty.  Osmar Mark MD IR Service.       Patient Story: pt having sever back pain - was seen here Tuesday and was to follow up and get prescriptions - pt reportedly did not get meds for home or get to follow up.  is in the hospital room 624 at the time of this note- he usually helps pt with a lot at home - she is currently  unable to care for herself alone at home     Focused Assessment:  Alert, slightly anxious but appears to be in pain as she states. Pt is a kidney transplant as of 7 years ago per her report.      Treatments and/or interventions provided: iv pain meds, oral antibiotic admission   Patient's response to treatments and/or interventions: improvement     To be done/followed up on inpatient unit:  monitor pain , daily meds and assist with ADLS.  Does this patient have any cognitive concerns?: none     Activity level - Baseline/Home:  Walker recent change for patient per pt report - pt stated she normally was able to walk  independently until this back pain started and could not get out of chair / bed last 2 days.   Activity Level - Current:  total    Patient's Preferred language: English   Needed?: No    Isolation: None  Infection: Not Applicable  Patient tested for COVID 19 prior to admission: YES  Bariatric?: No    Vital Signs:   Vitals:    06/18/21 1030 06/18/21 1100 06/18/21 1130 06/18/21 1150   BP: 104/66 130/79 126/68    Pulse: 67 67 69    Resp:       Temp:       TempSrc:       SpO2: 98% 98%  97% 94%   Weight:       Height:           Cardiac Rhythm:     Was the PSS-3 completed:   Yes  What interventions are required if any?               Family Comments:  is here in room 624 and pt reports she has not been able to talk with him yet   OBS brochure/video discussed/provided to patient/family: N/A              Name of person given brochure if not patient: na              Relationship to patient: na    For the majority of the shift this patient's behavior was Green.   Behavioral interventions performed were calming measures. .    ED NURSE PHONE NUMBER: [phone]

## 2021-06-18 NOTE — PHARMACY-ADMISSION MEDICATION HISTORY
Pharmacy Medication History  Admission medication history interview status for the 6/18/2021  admission is complete. See EPIC admission navigator for prior to admission medications     Location of Interview: Patient room  Medication history sources: Patient    Significant changes made to the medication list:  Patient reports taking higher acetaminophen dose than listed, patient also reported taking higher abilify dose than prescribed.    In the past week, patient estimated taking medication this percent of the time: 50-90% due to patient's fill history does not always align with sure scripts.    Additional medication history information:   Patient reports taking estradiol tablet, gabapentin, and latanoprost, but does not align with sure scripts.    Medication reconciliation completed by provider prior to medication history? No    Time spent in this activity: 20 minutes    Prior to Admission medications    Medication Sig Last Dose Taking? Auth Provider   acetaminophen (TYLENOL) 500 MG tablet Take 1,500 mg by mouth every 6 hours as needed for mild pain 6/17/2021 at pm Yes Unknown, Entered By History   ACETYLCYSTEINE PO Take 2 capsules by mouth daily (Patient does not know what strength they are taking.) 6/17/2021 at pm Yes Unknown, Entered By History   ARIPiprazole (ABILIFY) 2 MG tablet Take 1.5 tablets (3 mg) by mouth daily  Patient taking differently: Take 3 mg by mouth 2 times daily  6/17/2021 at pm Yes Chaparrita Hatch MD   aspirin EC 81 MG EC tablet Take 1 tablet (81 mg) by mouth daily 6/17/2021 Yes Berenice Torres APRN CNP   Cholecalciferol (VITAMIN D) 1000 UNITS capsule 1,000 Units  6/17/2021 Yes Fina Garcia MD   cyanocolbalamin (VITAMIN  B-12) 1000 MCG tablet Take 1 tablet by mouth daily. 6/17/2021 at Unknown time Yes Juan Warren APRN CNP   cycloSPORINE modified (GENERIC EQUIVALENT) 25 MG capsule Take 5 capsules (125 mg) by mouth 2 times daily TAKE 5 CAPSULES (125MG) BY MOUTH  TWO TIMES A DAY 6/17/2021 at pm Yes Francisco Jordan MD   estradiol (VAGIFEM) 10 MCG TABS vaginal tablet Place 1 tablet (10 mcg) vaginally twice a week Patient has not reported taking in a while Yes Param Salas APRN CNM   ferrous sulfate (FEROSUL) 325 (65 Fe) MG tablet Take 1 tablet (325 mg) by mouth daily (with breakfast) 6/17/2021 Yes Yogesh Santacruz MD   fluticasone (FLONASE) 50 MCG/ACT nasal spray Spray 1 spray into both nostrils daily 6/17/2021 Yes Anastasiya Chaney MD   furosemide (LASIX) 40 MG tablet Take 1 tablet (40 mg) by mouth daily 6/17/2021 Yes Horacio Sheridan MD   gabapentin (NEURONTIN) 300 MG capsule Take 2 capsules (600 mg) by mouth At Bedtime 6/17/2021 at pm Yes Horacio Sheridan MD   latanoprost (XALATAN) 0.005 % ophthalmic solution Place 1 drop into both eyes At Bedtime  Yes Kenton Charles MD   metFORMIN (GLUCOPHAGE-XR) 500 MG 24 hr tablet Take 3 tablets (1,500 mg) by mouth daily (with dinner) 6/18/2021 at pm Yes Horacio Sheridan MD   metolazone (ZAROXOLYN) 5 MG tablet TAKE 1 TABLET BY MOUTH DAILY AS NEEDED(WHILE WEIGHT IS ABOVE 190 POUNDS) Patient says it has been a couple of weeks since she took this medication. Yes Horacio Sheridan MD   mycophenolate (GENERIC EQUIVALENT) 250 MG capsule Take 4 capsules (1,000 mg) by mouth 2 times daily 6/18/2021 at am Yes Christian Jimenez MD   nystatin (MYCOSTATIN) 549014 UNIT/GM external cream Apply topically 2 times daily To toenails.  Yes Javier Tuttle DPM   omeprazole (PRILOSEC) 40 MG DR capsule Take 1 capsule (40 mg) by mouth daily  Yes Horacio Sheridan MD   Polyvinyl Alcohol-Povidone (REFRESH OP) Apply to eye as needed Both eyes 6/18/2021 at am Yes Reported, Patient   prazosin (MINIPRESS) 2 MG capsule TAKE 1 CAPSULE ALONG WITH THREE 5MG CAPSULES(15MG) EVERY NIGHT AT BEDTIME FOR A TOTAL DAILY DOSE OF 17MG 6/17/2021 at pm Yes Chaparrita Hatch MD   prazosin (MINIPRESS) 5 MG capsule Take 3 x 5mg (15mg) caps + 1 x 2mg caps at  bedtime (total dose=17mg) 6/17/2021 at pm Yes Chaparrita Hatch MD   simvastatin (ZOCOR) 20 MG tablet Take 1 tablet (20 mg) by mouth At Bedtime 6/17/2021 at pm Yes Horacio Sheridan MD   sulfamethoxazole-trimethoprim (BACTRIM) 400-80 MG tablet Take 1 tablet by mouth daily 6/17/2021 at pm Yes Christian Jimenez MD   topiramate (TOPAMAX) 200 MG tablet TAKE 1 TABLET(200 MG) BY MOUTH TWICE DAILY 6/17/2021 at pm Yes Prakash Irene MD   Vaginal Lubricant (REPLENS) GEL Use vaginally as needed. Can use up to 3 times per week. prn Yes Param Salas APRN CNBHAVNA   vilazodone (VIIBRYD) 40 MG TABS tablet Take 1 tablet (40 mg) by mouth daily 6/17/2021 at pm Yes Chaparrita Hatch MD   albuterol (PROAIR HFA/PROVENTIL HFA/VENTOLIN HFA) 108 (90 Base) MCG/ACT inhaler Inhale 2 puffs into the lungs every 6 hours as needed for shortness of breath / dyspnea or wheezing prn  Horacio Sheridan MD   blood glucose monitoring (ACCU-CHEK RALPH PLUS) meter device kit    Reported, Patient   blood glucose monitoring (NO BRAND SPECIFIED) meter device kit Use to test blood sugar 2 times daily or as directed.   Horacio Sheridan MD   blood glucose monitoring (NO BRAND SPECIFIED) test strip Use to test blood sugars 2 times daily or as directed   Horacio Sheridan MD   blood glucose monitoring (SOFTCLIX) lancets Use to test blood sugar 2 times daily or as directed.   Horacio Sheridan MD   ondansetron (ZOFRAN-ODT) 4 MG ODT tab Take 1 tablet (4 mg) by mouth every 6 hours as needed for nausea prn, last reports taking 2 weeks ago  Horacio Sheridan MD   order for DME Equipment being ordered: DME  [id number]   Ankle support, sm, fig 8, lace up   Javier Tuttle, DPM   order for DME Walker with front wheels and a seat.   Susan Tate MD   predniSONE (DELTASONE) 20 MG tablet Take two tablets (= 40mg) each day for 5 (five) days Patient reports taking one dose  Juan Armstrong MD       The information provided in this  note is only as accurate as the sources available at the time of update(s)

## 2021-06-18 NOTE — LETTER
Transition Communication Hand-off for Care Transitions to Next Level of Care Provider    Name: Elizabeth Palma  : 1955  MRN #: [id number]  Primary Care Provider: Horacio Sheridan     Primary Clinic: [address] [place] [address]  [facility] [id number]     Reason for Hospitalization:  H/O kidney transplant [Z94.0]  Intractable back pain [M54.9]  Urinary tract infection with hematuria, site unspecified [N39.0, R31.9]  Admit Date/Time: 2021  9:24 AM  Discharge Date:       Discharge Plan:  Discharge home with home Physical Therapy             Referrals     Future Labs/Procedures    Home Care PT Referral for Hospital Discharge     Comments:    PT to eval and treat    Your provider has ordered home care - physical therapy. If you have not been contacted within 2 days of your discharge please call the department phone number listed on the top of this document.          Bobbi Gonzales RN, BSN  [facility]  Inpatient Care Coordinator  M: [phone]    Please reply within 2 hours.  Thank you. KK

## 2021-06-18 NOTE — PROGRESS NOTES
RECEIVING UNIT ED HANDOFF REVIEW    ED Nurse Handoff Report was reviewed by: Sarai Hernandez RN on June 18, 2021 at 3:37 PM     Please give IV ABX due at 9110

## 2021-06-18 NOTE — ED NOTES
Bed: ED09  Expected date:   Expected time:   Means of arrival:   Comments:  Hem 439 64f low back pain 916

## 2021-06-18 NOTE — PLAN OF CARE
A&Ox4. VSS on RA. L/s clear. Denies chest pain or SOB. Lower back pain 10/10, radiates to bilateral legs. Numbness/tingling to BLE/BUE. Pain managed with prn Norco. Tolerating mod carb diet. Turn and repo done. Using purewick. Plan is pain control and then PT eval tomorrow.

## 2021-06-19 ENCOUNTER — APPOINTMENT (OUTPATIENT)
Dept: MRI IMAGING | Facility: CLINIC | Age: 66
DRG: 699 | End: 2021-06-19
Attending: INTERNAL MEDICINE
Payer: MEDICARE

## 2021-06-19 PROBLEM — R78.81 BACTEREMIA: Status: ACTIVE | Noted: 2021-06-19

## 2021-06-19 LAB
ANION GAP SERPL CALCULATED.3IONS-SCNC: 9 MMOL/L (ref 3–14)
BUN SERPL-MCNC: 18 MG/DL (ref 7–30)
CALCIUM SERPL-MCNC: 8.3 MG/DL (ref 8.5–10.1)
CHLORIDE SERPL-SCNC: 111 MMOL/L (ref 94–109)
CO2 SERPL-SCNC: 19 MMOL/L (ref 20–32)
CREAT SERPL-MCNC: 0.98 MG/DL (ref 0.52–1.04)
ERYTHROCYTE [DISTWIDTH] IN BLOOD BY AUTOMATED COUNT: 13.7 % (ref 10–15)
GFR SERPL CREATININE-BSD FRML MDRD: 61 ML/MIN/{1.73_M2}
GLUCOSE BLDC GLUCOMTR-MCNC: 150 MG/DL (ref 70–99)
GLUCOSE BLDC GLUCOMTR-MCNC: 164 MG/DL (ref 70–99)
GLUCOSE BLDC GLUCOMTR-MCNC: 166 MG/DL (ref 70–99)
GLUCOSE BLDC GLUCOMTR-MCNC: 173 MG/DL (ref 70–99)
GLUCOSE SERPL-MCNC: 185 MG/DL (ref 70–99)
HBA1C MFR BLD: 6.9 % (ref 0–5.6)
HCT VFR BLD AUTO: 35.7 % (ref 35–47)
HGB BLD-MCNC: 11.4 G/DL (ref 11.7–15.7)
MCH RBC QN AUTO: 28.4 PG (ref 26.5–33)
MCHC RBC AUTO-ENTMCNC: 31.9 G/DL (ref 31.5–36.5)
MCV RBC AUTO: 89 FL (ref 78–100)
PLATELET # BLD AUTO: 163 10E9/L (ref 150–450)
POTASSIUM SERPL-SCNC: 3.6 MMOL/L (ref 3.4–5.3)
RBC # BLD AUTO: 4.01 10E12/L (ref 3.8–5.2)
SODIUM SERPL-SCNC: 139 MMOL/L (ref 133–144)
WBC # BLD AUTO: 4.9 10E9/L (ref 4–11)

## 2021-06-19 PROCEDURE — 250N000013 HC RX MED GY IP 250 OP 250 PS 637: Performed by: INTERNAL MEDICINE

## 2021-06-19 PROCEDURE — 83036 HEMOGLOBIN GLYCOSYLATED A1C: CPT | Performed by: INTERNAL MEDICINE

## 2021-06-19 PROCEDURE — 250N000012 HC RX MED GY IP 250 OP 636 PS 637: Performed by: INTERNAL MEDICINE

## 2021-06-19 PROCEDURE — 36415 COLL VENOUS BLD VENIPUNCTURE: CPT | Performed by: INTERNAL MEDICINE

## 2021-06-19 PROCEDURE — 85027 COMPLETE CBC AUTOMATED: CPT | Performed by: INTERNAL MEDICINE

## 2021-06-19 PROCEDURE — 999N001017 HC STATISTIC GLUCOSE BY METER IP

## 2021-06-19 PROCEDURE — 99233 SBSQ HOSP IP/OBS HIGH 50: CPT | Performed by: INTERNAL MEDICINE

## 2021-06-19 PROCEDURE — 80048 BASIC METABOLIC PNL TOTAL CA: CPT | Performed by: INTERNAL MEDICINE

## 2021-06-19 PROCEDURE — 80158 DRUG ASSAY CYCLOSPORINE: CPT | Performed by: INTERNAL MEDICINE

## 2021-06-19 PROCEDURE — G0378 HOSPITAL OBSERVATION PER HR: HCPCS

## 2021-06-19 PROCEDURE — 250N000011 HC RX IP 250 OP 636: Performed by: INTERNAL MEDICINE

## 2021-06-19 PROCEDURE — A9585 GADOBUTROL INJECTION: HCPCS | Performed by: INTERNAL MEDICINE

## 2021-06-19 PROCEDURE — 72158 MRI LUMBAR SPINE W/O & W/DYE: CPT | Mod: ME

## 2021-06-19 PROCEDURE — 255N000002 HC RX 255 OP 636: Performed by: INTERNAL MEDICINE

## 2021-06-19 PROCEDURE — 120N000001 HC R&B MED SURG/OB

## 2021-06-19 PROCEDURE — 80180 DRUG SCRN QUAN MYCOPHENOLATE: CPT | Performed by: INTERNAL MEDICINE

## 2021-06-19 RX ORDER — GABAPENTIN 300 MG/1
600 CAPSULE ORAL AT BEDTIME
Status: DISCONTINUED | OUTPATIENT
Start: 2021-06-19 | End: 2021-06-22 | Stop reason: HOSPADM

## 2021-06-19 RX ORDER — SULFAMETHOXAZOLE AND TRIMETHOPRIM 400; 80 MG/1; MG/1
1 TABLET ORAL DAILY
Status: DISCONTINUED | OUTPATIENT
Start: 2021-06-19 | End: 2021-06-22 | Stop reason: HOSPADM

## 2021-06-19 RX ORDER — LATANOPROST 50 UG/ML
1 SOLUTION/ DROPS OPHTHALMIC AT BEDTIME
Status: DISCONTINUED | OUTPATIENT
Start: 2021-06-19 | End: 2021-06-22 | Stop reason: HOSPADM

## 2021-06-19 RX ORDER — METFORMIN HYDROCHLORIDE 750 MG/1
1500 TABLET, EXTENDED RELEASE ORAL
Status: DISCONTINUED | OUTPATIENT
Start: 2021-06-19 | End: 2021-06-22 | Stop reason: HOSPADM

## 2021-06-19 RX ORDER — ALBUTEROL SULFATE 90 UG/1
2 AEROSOL, METERED RESPIRATORY (INHALATION) EVERY 6 HOURS PRN
Status: DISCONTINUED | OUTPATIENT
Start: 2021-06-19 | End: 2021-06-22 | Stop reason: HOSPADM

## 2021-06-19 RX ORDER — TOPIRAMATE 200 MG/1
200 TABLET, FILM COATED ORAL 2 TIMES DAILY
Status: DISCONTINUED | OUTPATIENT
Start: 2021-06-19 | End: 2021-06-22 | Stop reason: HOSPADM

## 2021-06-19 RX ORDER — DEXTROSE MONOHYDRATE 25 G/50ML
25-50 INJECTION, SOLUTION INTRAVENOUS
Status: DISCONTINUED | OUTPATIENT
Start: 2021-06-19 | End: 2021-06-22 | Stop reason: HOSPADM

## 2021-06-19 RX ORDER — FUROSEMIDE 40 MG
40 TABLET ORAL DAILY
Status: DISCONTINUED | OUTPATIENT
Start: 2021-06-19 | End: 2021-06-22 | Stop reason: HOSPADM

## 2021-06-19 RX ORDER — NICOTINE POLACRILEX 4 MG
15-30 LOZENGE BUCCAL
Status: DISCONTINUED | OUTPATIENT
Start: 2021-06-19 | End: 2021-06-22 | Stop reason: HOSPADM

## 2021-06-19 RX ORDER — GADOBUTROL 604.72 MG/ML
10 INJECTION INTRAVENOUS ONCE
Status: COMPLETED | OUTPATIENT
Start: 2021-06-19 | End: 2021-06-19

## 2021-06-19 RX ORDER — VILAZODONE HYDROCHLORIDE 20 MG/1
40 TABLET ORAL DAILY
Status: DISCONTINUED | OUTPATIENT
Start: 2021-06-19 | End: 2021-06-22 | Stop reason: HOSPADM

## 2021-06-19 RX ORDER — MYCOPHENOLATE MOFETIL 250 MG/1
1000 CAPSULE ORAL 2 TIMES DAILY
Status: DISCONTINUED | OUTPATIENT
Start: 2021-06-19 | End: 2021-06-22 | Stop reason: HOSPADM

## 2021-06-19 RX ADMIN — SULFAMETHOXAZOLE AND TRIMETHOPRIM 1 TABLET: 400; 80 TABLET ORAL at 09:55

## 2021-06-19 RX ADMIN — FUROSEMIDE 40 MG: 40 TABLET ORAL at 08:56

## 2021-06-19 RX ADMIN — SENNOSIDES AND DOCUSATE SODIUM 1 TABLET: 8.6; 5 TABLET ORAL at 08:56

## 2021-06-19 RX ADMIN — GABAPENTIN 600 MG: 300 CAPSULE ORAL at 21:48

## 2021-06-19 RX ADMIN — ARIPIPRAZOLE 3 MG: 2 TABLET ORAL at 21:48

## 2021-06-19 RX ADMIN — HYDROCODONE BITARTRATE AND ACETAMINOPHEN 1 TABLET: 5; 325 TABLET ORAL at 05:46

## 2021-06-19 RX ADMIN — ARIPIPRAZOLE 3 MG: 2 TABLET ORAL at 12:28

## 2021-06-19 RX ADMIN — CEFTRIAXONE SODIUM 2 G: 2 INJECTION, POWDER, FOR SOLUTION INTRAMUSCULAR; INTRAVENOUS at 16:49

## 2021-06-19 RX ADMIN — CYCLOSPORINE 125 MG: 100 CAPSULE, LIQUID FILLED ORAL at 19:06

## 2021-06-19 RX ADMIN — SENNOSIDES AND DOCUSATE SODIUM 1 TABLET: 8.6; 5 TABLET ORAL at 19:06

## 2021-06-19 RX ADMIN — VILAZODONE HYDROCHLORIDE 40 MG: 20 TABLET ORAL at 09:55

## 2021-06-19 RX ADMIN — TOPIRAMATE 200 MG: 200 TABLET, FILM COATED ORAL at 21:49

## 2021-06-19 RX ADMIN — TOPIRAMATE 200 MG: 200 TABLET, FILM COATED ORAL at 09:55

## 2021-06-19 RX ADMIN — HYDROCODONE BITARTRATE AND ACETAMINOPHEN 2 TABLET: 5; 325 TABLET ORAL at 09:55

## 2021-06-19 RX ADMIN — METFORMIN HYDROCHLORIDE 1500 MG: 750 TABLET, EXTENDED RELEASE ORAL at 16:49

## 2021-06-19 RX ADMIN — MYCOPHENOLATE MOFETIL 1000 MG: 250 CAPSULE ORAL at 19:05

## 2021-06-19 RX ADMIN — GADOBUTROL 10 ML: 604.72 INJECTION INTRAVENOUS at 10:59

## 2021-06-19 ASSESSMENT — ACTIVITIES OF DAILY LIVING (ADL)
ADLS_ACUITY_SCORE: 15
ADLS_ACUITY_SCORE: 19

## 2021-06-19 NOTE — PROGRESS NOTES
[facility] calls to report +BC from L arm on 6/18/2021 at 1455 growing gram negative rods. MD previously made aware of same results. See other note from today.

## 2021-06-19 NOTE — PROVIDER NOTIFICATION
MD Notification    Notified Person: MD    Notified Person Name: Dr. Lanier    Notification Date/Time: 6/19/2021 1110    Notification Interaction: Web page    Purpose of Notification: MADISON DAVIS calls back to report previously reported +BC from L arm on 6/18/2021 at 1419 has been identified as E. coli.     Orders Received:    Comments:

## 2021-06-19 NOTE — PROGRESS NOTES
[facility]    Medicine Progress Note - Hospitalist Service        Date of Admission:  6/18/2021  9:24 AM    Assessment & Plan:     65 year old female with past medical hx of PCKD with renal transplant and DM type 2, who presents with  intractable back pain    Gram-negative debra bacteremia  Urinary tract infection  -Patient presenting with intractable back pain, UA was abnormal, suggesting urinary tract infection  -Blood culture from 6/18 now positive for gram-negative rods  -Await urine culture  -Continue ceftriaxone 2 g IV daily  -ID consult    Intractable back pain   Degenerative Disc Desease L5-S1  Elevated CRP  -Patient does have history of chronic back pain which got worse in the last week or so  -Pain is midline with radiation down to the right heel  -CT of the lumbar spine without contrast showed multilevel degenerative changes of the lumbar spine, most pronounced at L5-S1  -Given bacteremia and ongoing pain, will check an MRI of the lumbar spine  -Hold off on further steroids given bacteremia  -Elevated CRP could be explained by bacteremia although await MRI of the lumbar spine       Essential hypertension  -Blood pressure on the hypotensive side, hold prior to admission antihypertensives     DM type 2 with Neuropathy  -Hemoglobin A1c 6.9  -Continue prior to admission Metformin  -Medium intensity sliding scale    PCKD, S/P Renal Transplant 2014 ([facility])  -Check cyclosporine and mycophenolate level  -Continue prior to admission mycophenolate and cyclosporine                  Diet: Combination Diet 8352-5463 Calories: Moderate Consistent CHO (4-6 CHO units/meal)     DVT Prophylaxis: Pneumatic Compression Devices   Collier Catheter: not present  Code Status: Full Code     Disposition Plan    Expected discharge: 2 - 3 days, recommended to TBD; switched to inpatient today.    Entered: Evans Lanier MD 06/19/2021, 10:40 AM        The patient's care was discussed with the Bedside Nurse and  Patient.    Evans Lanier MD  Hospitalist Service  [facility]    ______________________________________________________________________    Interval History   Continues to have back pain in the lower lumbar area.  Made worse by movement, reports 8/10.  She also reports radiation of the pain to the right heel area.  Afebrile.  No nausea or vomiting.    Data reviewed today: I reviewed all medications, new labs and imaging results over the last 24 hours. I personally reviewed no images or EKG's today.    Physical Exam   Vital signs:  Temp: 98.1  F (36.7  C) Temp src: Oral BP: 94/57 Pulse: 61   Resp: 18 SpO2: 95 % O2 Device: None (Room air)   Height: 152.4 cm (5') Weight: 88 kg (194 lb)  Estimated body mass index is 37.89 kg/m  as calculated from the following:    Height as of this encounter: 1.524 m (5').    Weight as of this encounter: 88 kg (194 lb).      Wt Readings from Last 2 Encounters:   06/18/21 88 kg (194 lb)   03/29/21 88.9 kg (196 lb)       Gen: AAOX3, NAD  HEENT: no pallor  Resp: CTA B/L, normal WOB  CVS: RRR, no murmur  Abd/GI: Soft, non-tender. BS- normoactive.    Skin: Warm, dry no rashes  MSK: Tender over the lower lumbar area, SLRT of the right LE ~20 degrees  Neuro- CN- intact.         Data   Recent Labs   Lab 06/19/21  0748 06/18/21  0957 06/16/21  2145 06/15/21  1527   WBC 4.9 6.8 7.4 5.7   HGB 11.4* 13.0 13.7 13.2   MCV 89 90 89 90    176 177 144*    139 136 137   POTASSIUM 3.6 4.2 3.8 3.7   CHLORIDE 111* 111* 105 107   CO2 19* 20 20 24   BUN 18 22 19 20   CR 0.98 1.03 1.24* 1.22*   ANIONGAP 9 8 11 6   MARY 8.3* 9.2 9.8 9.1   * 161* 203* 193*   ALBUMIN  --  2.8*  --  3.3*   PROTTOTAL  --  6.8  --  6.8   BILITOTAL  --  1.0  --  0.8   ALKPHOS  --  94  --  53   ALT  --  51*  --  22   AST  --  21  --  8       Recent Results (from the past 24 hour(s))   Lumbar spine CT w/o contrast    Narrative    CT LUMBAR SPINE WITHOUT CONTRAST  6/18/2021 12:48 PM     HISTORY:  Low back pain, increased fracture risk.     TECHNIQUE: Axial images of the lumbar spine were obtained without  intravenous contrast. Multiplanar reformations were performed.   Radiation dose for this scan was reduced using automated exposure  control, adjustment of the mA and/or kV according to patient size, or  iterative reconstruction technique.     COMPARISON: Lumbar spine radiographs 5/16/2012.     FINDINGS:  Nomenclature is based on five lumbar vertebral bodies. No  acute fracture. Normal vertebral body heights. Small chronic-appearing  Schmorl's nodes at the L3-L4 and L5-S1 levels, with tiny Schmorl's  nodes/degenerative endplate irregularities elsewhere. Minimal apparent  levoconvex curvature centered at L4-L5. Alignment is otherwise normal.    Moderate to severe disc height loss at L5-S1 with vacuum disc  phenomenon and marginal endplate osteophytes. Minimal disc height loss  elsewhere in the lumbar spine. Mild to moderate left and mild right  L5-S1 degenerative facet joint arthropathy. No evidence for high-grade  spinal canal stenosis. There is moderate-appearing left and mild to  moderate-appearing right L5-S1 neural foraminal stenosis and mild  bilateral L4-L5 neural foraminal stenosis. No significant neural  foraminal narrowing elsewhere.    There appear to be at least small bilateral pleural effusions, as seen  on prior CT. Innumerable cysts throughout the visualized aspects of  the kidneys with scattered small/punctate areas of calcification as  well as multiple partially visualized cystic lesions in the liver, in  keeping with the patient's history of polycystic kidney disease.  Low-density ovoid left adrenal mass measuring 2.9 cm in the axial  plane, in keeping with the patient's history of lipid-rich adrenal  adenoma. Scattered atherosclerotic calcifications are noted.      Impression    IMPRESSION:  1. No acute osseous abnormality identified.  2. Multilevel degenerative changes of the lumbar spine,  most  pronounced at L5-S1. Please see the body of the report for details.  3. Partially visualized findings in keeping with the patient's history  of polycystic kidney disease.  4. Unchanged low-density ovoid left adrenal mass, compatible with an  adrenal adenoma.    ANGIE BROWN MD     Medications     sodium chloride 100 mL/hr at 06/18/21 1632       ARIPiprazole  3 mg Oral BID     cefTRIAXone  2 g Intravenous Q24H     cycloSPORINE modified  125 mg Oral BID     furosemide  40 mg Oral Daily     gabapentin  600 mg Oral At Bedtime     gadobutrol  10 mL Intravenous Once     insulin aspart  1-7 Units Subcutaneous TID AC     insulin aspart  1-5 Units Subcutaneous At Bedtime     latanoprost  1 drop Both Eyes At Bedtime     metFORMIN  1,500 mg Oral Daily with supper     mycophenolate  1,000 mg Oral BID     senna-docusate  1 tablet Oral BID    Or     senna-docusate  2 tablet Oral BID     sodium chloride (PF)  3 mL Intracatheter Q8H     sulfamethoxazole-trimethoprim  1 tablet Oral Daily     topiramate  200 mg Oral BID     vilazodone  40 mg Oral Daily

## 2021-06-19 NOTE — PLAN OF CARE
PT: Eval orders received, chart reviewed. Pt now IP status. Checked in with pt in PM, pt eating lunch, requesting to hold PT evaluation until tomorrow. Cancel

## 2021-06-19 NOTE — PLAN OF CARE
VSS, on RA, A/Ox4, Ax2 walker but refuses to get OOB d/t pain, Norco given x1 for pain, PIV infusing NS, Q2H turn and repoellen in place, mod carb diet, pt has hx of renal transplant and wants her anti-rejection meds added to MAR, plan for PT to assess patient, continue to monitor

## 2021-06-19 NOTE — PROVIDER NOTIFICATION
MD Notification    Notified Person: MD    Notified Person Name: Dr. Lanier    Notification Date/Time: 6/19/2021 0759    Notification Interaction: Web page    Purpose of Notification: IDDM calls to report +BC collected from L arm at 1419 on 6/18/2021 with gram - rods. Please advise.    Orders Received: Patient currently on IV rocephin. MD to see patient.    Comments:

## 2021-06-19 NOTE — PROVIDER NOTIFICATION
MD Notification    Notified Person: MD    Notified Person Name: Dr. Lanier    Notification Date/Time: 6/19/2021 3290    Notification Interaction: Web page    Purpose of Notification: Do you need cyclosporine blood level and mycophenolate blood level before administering med?     Orders Received: See orders for labs to be collected.    Comments:

## 2021-06-20 ENCOUNTER — APPOINTMENT (OUTPATIENT)
Dept: PHYSICAL THERAPY | Facility: CLINIC | Age: 66
DRG: 699 | End: 2021-06-20
Attending: INTERNAL MEDICINE
Payer: MEDICARE

## 2021-06-20 LAB
BACTERIA SPEC CULT: ABNORMAL
BACTERIA SPEC CULT: ABNORMAL
CYCLOSPORINE BLD LC/MS/MS-MCNC: 75 UG/L (ref 50–400)
GLUCOSE BLDC GLUCOMTR-MCNC: 140 MG/DL (ref 70–99)
GLUCOSE BLDC GLUCOMTR-MCNC: 148 MG/DL (ref 70–99)
GLUCOSE BLDC GLUCOMTR-MCNC: 150 MG/DL (ref 70–99)
GLUCOSE BLDC GLUCOMTR-MCNC: 155 MG/DL (ref 70–99)
GLUCOSE BLDC GLUCOMTR-MCNC: 164 MG/DL (ref 70–99)
Lab: ABNORMAL
SPECIMEN SOURCE: ABNORMAL
TME LAST DOSE: NORMAL H

## 2021-06-20 PROCEDURE — 250N000013 HC RX MED GY IP 250 OP 250 PS 637: Performed by: INTERNAL MEDICINE

## 2021-06-20 PROCEDURE — 120N000001 HC R&B MED SURG/OB

## 2021-06-20 PROCEDURE — 250N000012 HC RX MED GY IP 250 OP 636 PS 637: Performed by: INTERNAL MEDICINE

## 2021-06-20 PROCEDURE — 97162 PT EVAL MOD COMPLEX 30 MIN: CPT | Mod: GP

## 2021-06-20 PROCEDURE — 99233 SBSQ HOSP IP/OBS HIGH 50: CPT | Performed by: INTERNAL MEDICINE

## 2021-06-20 PROCEDURE — 999N001017 HC STATISTIC GLUCOSE BY METER IP

## 2021-06-20 PROCEDURE — 97530 THERAPEUTIC ACTIVITIES: CPT | Mod: GP

## 2021-06-20 PROCEDURE — 250N000011 HC RX IP 250 OP 636: Performed by: INTERNAL MEDICINE

## 2021-06-20 RX ORDER — NALOXONE HYDROCHLORIDE 0.4 MG/ML
0.2 INJECTION, SOLUTION INTRAMUSCULAR; INTRAVENOUS; SUBCUTANEOUS
Status: DISCONTINUED | OUTPATIENT
Start: 2021-06-20 | End: 2021-06-22 | Stop reason: HOSPADM

## 2021-06-20 RX ORDER — LIDOCAINE 4 G/G
1 PATCH TOPICAL
Status: DISCONTINUED | OUTPATIENT
Start: 2021-06-20 | End: 2021-06-22 | Stop reason: HOSPADM

## 2021-06-20 RX ORDER — NALOXONE HYDROCHLORIDE 0.4 MG/ML
0.4 INJECTION, SOLUTION INTRAMUSCULAR; INTRAVENOUS; SUBCUTANEOUS
Status: DISCONTINUED | OUTPATIENT
Start: 2021-06-20 | End: 2021-06-22 | Stop reason: HOSPADM

## 2021-06-20 RX ORDER — SIMVASTATIN 20 MG
20 TABLET ORAL AT BEDTIME
Status: DISCONTINUED | OUTPATIENT
Start: 2021-06-20 | End: 2021-06-22 | Stop reason: HOSPADM

## 2021-06-20 RX ORDER — ACETAMINOPHEN 325 MG/1
975 TABLET ORAL EVERY 8 HOURS SCHEDULED
Status: DISCONTINUED | OUTPATIENT
Start: 2021-06-20 | End: 2021-06-22 | Stop reason: HOSPADM

## 2021-06-20 RX ORDER — OXYCODONE HYDROCHLORIDE 5 MG/1
5 TABLET ORAL EVERY 6 HOURS PRN
Status: DISCONTINUED | OUTPATIENT
Start: 2021-06-20 | End: 2021-06-22 | Stop reason: HOSPADM

## 2021-06-20 RX ADMIN — CEFTRIAXONE SODIUM 2 G: 2 INJECTION, POWDER, FOR SOLUTION INTRAMUSCULAR; INTRAVENOUS at 16:20

## 2021-06-20 RX ADMIN — INSULIN ASPART 1 UNITS: 100 INJECTION, SOLUTION INTRAVENOUS; SUBCUTANEOUS at 12:53

## 2021-06-20 RX ADMIN — CYCLOSPORINE 125 MG: 100 CAPSULE, LIQUID FILLED ORAL at 21:56

## 2021-06-20 RX ADMIN — INSULIN ASPART 1 UNITS: 100 INJECTION, SOLUTION INTRAVENOUS; SUBCUTANEOUS at 08:30

## 2021-06-20 RX ADMIN — LATANOPROST 1 DROP: 50 SOLUTION/ DROPS OPHTHALMIC at 21:59

## 2021-06-20 RX ADMIN — MYCOPHENOLATE MOFETIL 1000 MG: 250 CAPSULE ORAL at 08:30

## 2021-06-20 RX ADMIN — MYCOPHENOLATE MOFETIL 1000 MG: 250 CAPSULE ORAL at 21:56

## 2021-06-20 RX ADMIN — TOPIRAMATE 200 MG: 200 TABLET, FILM COATED ORAL at 08:31

## 2021-06-20 RX ADMIN — POLYETHYLENE GLYCOL 3350 17 G: 17 POWDER, FOR SOLUTION ORAL at 16:20

## 2021-06-20 RX ADMIN — TOPIRAMATE 200 MG: 200 TABLET, FILM COATED ORAL at 21:56

## 2021-06-20 RX ADMIN — ARIPIPRAZOLE 3 MG: 2 TABLET ORAL at 21:57

## 2021-06-20 RX ADMIN — OXYCODONE HYDROCHLORIDE 5 MG: 5 TABLET ORAL at 09:32

## 2021-06-20 RX ADMIN — SENNOSIDES AND DOCUSATE SODIUM 1 TABLET: 8.6; 5 TABLET ORAL at 08:31

## 2021-06-20 RX ADMIN — INSULIN ASPART 1 UNITS: 100 INJECTION, SOLUTION INTRAVENOUS; SUBCUTANEOUS at 18:15

## 2021-06-20 RX ADMIN — CYCLOSPORINE 125 MG: 100 CAPSULE, LIQUID FILLED ORAL at 08:30

## 2021-06-20 RX ADMIN — VILAZODONE HYDROCHLORIDE 40 MG: 20 TABLET ORAL at 08:31

## 2021-06-20 RX ADMIN — SULFAMETHOXAZOLE AND TRIMETHOPRIM 1 TABLET: 400; 80 TABLET ORAL at 08:31

## 2021-06-20 RX ADMIN — LIDOCAINE 1 PATCH: 560 PATCH PERCUTANEOUS; TOPICAL; TRANSDERMAL at 09:31

## 2021-06-20 RX ADMIN — ACETAMINOPHEN 975 MG: 325 TABLET, FILM COATED ORAL at 09:32

## 2021-06-20 RX ADMIN — OXYCODONE HYDROCHLORIDE 5 MG: 5 TABLET ORAL at 16:20

## 2021-06-20 RX ADMIN — METFORMIN HYDROCHLORIDE 1500 MG: 750 TABLET, EXTENDED RELEASE ORAL at 18:17

## 2021-06-20 RX ADMIN — SIMVASTATIN 20 MG: 20 TABLET, FILM COATED ORAL at 21:57

## 2021-06-20 RX ADMIN — SENNOSIDES AND DOCUSATE SODIUM 2 TABLET: 8.6; 5 TABLET ORAL at 21:56

## 2021-06-20 RX ADMIN — ACETAMINOPHEN 975 MG: 325 TABLET, FILM COATED ORAL at 21:56

## 2021-06-20 RX ADMIN — GABAPENTIN 600 MG: 300 CAPSULE ORAL at 21:56

## 2021-06-20 RX ADMIN — ACETAMINOPHEN 975 MG: 325 TABLET, FILM COATED ORAL at 13:36

## 2021-06-20 RX ADMIN — ARIPIPRAZOLE 3 MG: 2 TABLET ORAL at 08:31

## 2021-06-20 ASSESSMENT — ACTIVITIES OF DAILY LIVING (ADL)
ADLS_ACUITY_SCORE: 19
ADLS_ACUITY_SCORE: 18
ADLS_ACUITY_SCORE: 17
ADLS_ACUITY_SCORE: 17
ADLS_ACUITY_SCORE: 18
ADLS_ACUITY_SCORE: 18

## 2021-06-20 ASSESSMENT — MIFFLIN-ST. JEOR: SCORE: 1387.5

## 2021-06-20 NOTE — CONSULTS
[facility]    Infectious Disease Consultation     Date of Admission:  2021  Date of Consult (When I saw the patient): 21    Assessment & Plan   Elizabeth Palma is a 65 year old female who was admitted on 2021.     Impression:  1. 65 y.o female with PCKD.   2. History of renal transplant.   3. Diabetes   4. On bactrim for PJP prophylaxis.   5. Admitted this occasion with urosepsis.   6. Blood cultures with E coli, UC also with E coli.   7. On ceftriaxone organism is sensitive.     Recommendations:   1. Continue on ceftriaxone.       Tali Cavazos MD    Reason for Consult   Reason for consult: I was asked to evaluate this patient for urosepsis     Primary Care Physician   Horacio Sheridan    Chief Complaint   Back pain     History is obtained from the patient and medical records    History of Present Illness   Elizabeth Palma is a 65 year old female with past medical hx of PCKD with renal transplant and DM type 2, who presents with intractable back pain    Past Medical History   I have reviewed this patient's medical history and updated it with pertinent information if needed.   Past Medical History:   Diagnosis Date     Abnormal MRI, cervical spine 10/15/2011    2011; mild changes noted. Study done for left arm symptoms Impression:  1. Mild multilevel degenerative disc disease with no significant canal or neural stenosis seen. motion artifact on the STIR images in these are not interpretable. The remaining images were interpreted      Autosomal dominant polycystic kidney disease 2011     (Problem list name updated by automated process. Provider to review and confirm.)     [facility] DJD(carpometacarpal degenerative joint disease), localized primary 2013     -donor kidney transplant 2014     Gastroesophageal reflux disease      Generalized anxiety disorder 11/15/2012     Glaucoma      Hyperlipidemia 10/15/2011     Hyperparathyroidism, secondary (H) 2015      Hypertension     resolved     Immunosuppressed status (H) 03/20/2014     Major depressive disorder, recurrent episode, moderate (H) 11/15/2012     Obesity (BMI 30-39.9)      OP (osteoporosis) T score -3.8 09/21/2009 2007 T-score -3.7      LION (obstructive sleep apnoea) 10/15/2012    reported intolerant to CPAP -- she says she doesn't have LION     Pain in joint, forearm -- L unhealed Fx 05/21/2013     Premature menopause age 37 07/10/2012    OCP (vaginal bldg)-->HT which she stopped 2 mo later documented at Jan 12, 2007 visit (age 51).      Restless leg syndrome      Stiffness of joint, not elsewhere classified, hand 03/05/2013     Tremor 10/15/2011    head     Type 2 DM with Neuropathy 1985    started with gestational diabetes     Uncomplicated asthma        Past Surgical History   I have reviewed this patient's surgical history and updated it with pertinent information if needed.  Past Surgical History:   Procedure Laterality Date     ABDOMEN SURGERY       ANKLE SURGERY       C TRANSPLANTATION OF KIDNEY  03/2014     C/SECTION, LOW TRANSVERSE      x 2     CHOLECYSTECTOMY  1990     COLONOSCOPY       ESOPHAGOSCOPY, GASTROSCOPY, DUODENOSCOPY (EGD), COMBINED N/A 05/19/2015    Procedure: COMBINED ESOPHAGOSCOPY, GASTROSCOPY, DUODENOSCOPY (EGD);  Surgeon: Sky Davey MD;  Location:  GI     ESOPHAGOSCOPY, GASTROSCOPY, DUODENOSCOPY (EGD), COMBINED N/A 05/19/2015    Procedure: COMBINED ESOPHAGOSCOPY, GASTROSCOPY, DUODENOSCOPY (EGD), BIOPSY SINGLE OR MULTIPLE;  Surgeon: Sky Davey MD;  Location:  GI     EYE SURGERY       LAPAROSCOPY, SURGICAL; REPAIR INCISIONAL OR VENTRAL HERNIA       LASER SURGERY OF EYE Left 10/01/2020    sever vitreous strands     ORTHOPEDIC SURGERY       HERMINIA EN Y BOWEL  1990     WRIST SURGERY         Prior to Admission Medications   Prior to Admission Medications   Prescriptions Last Dose Informant Patient Reported? Taking?   ACETYLCYSTEINE PO 6/17/2021 at pm  Yes Yes    Sig: Take 2 capsules by mouth daily (Patient does not know what strength they are taking.)   ARIPiprazole (ABILIFY) 2 MG tablet 2021 at pm  No Yes   Sig: Take 1.5 tablets (3 mg) by mouth daily   Patient taking differently: Take 3 mg by mouth 2 times daily    Cholecalciferol (VITAMIN D) 1000 UNITS capsule 2021  Yes Yes   Si,000 Units    Polyvinyl Alcohol-Povidone (REFRESH OP) 2021 at am  Yes Yes   Sig: Apply to eye as needed Both eyes   Vaginal Lubricant (REPLENS) GEL prn  No Yes   Sig: Use vaginally as needed. Can use up to 3 times per week.   acetaminophen (TYLENOL) 500 MG tablet 2021 at pm  Yes Yes   Sig: Take 1,500 mg by mouth every 6 hours as needed for mild pain   albuterol (PROAIR HFA/PROVENTIL HFA/VENTOLIN HFA) 108 (90 Base) MCG/ACT inhaler prn  No No   Sig: Inhale 2 puffs into the lungs every 6 hours as needed for shortness of breath / dyspnea or wheezing   aspirin EC 81 MG EC tablet 2021  No Yes   Sig: Take 1 tablet (81 mg) by mouth daily   blood glucose monitoring (ACCU-CHEK RALPH PLUS) meter device kit   Yes No   blood glucose monitoring (NO BRAND SPECIFIED) meter device kit   No No   Sig: Use to test blood sugar 2 times daily or as directed.   blood glucose monitoring (NO BRAND SPECIFIED) test strip   No No   Sig: Use to test blood sugars 2 times daily or as directed   blood glucose monitoring (SOFTCLIX) lancets   No No   Sig: Use to test blood sugar 2 times daily or as directed.   cyanocolbalamin (VITAMIN  B-12) 1000 MCG tablet 2021 at Unknown time  No Yes   Sig: Take 1 tablet by mouth daily.   cycloSPORINE modified (GENERIC EQUIVALENT) 25 MG capsule 2021 at pm  No Yes   Sig: Take 5 capsules (125 mg) by mouth 2 times daily TAKE 5 CAPSULES (125MG) BY MOUTH TWO TIMES A DAY   estradiol (VAGIFEM) 10 MCG TABS vaginal tablet Patient has not reported taking in a while  No No   Sig: Place 1 tablet (10 mcg) vaginally twice a week   ferrous sulfate (FEROSUL) 325 (65 Fe) MG  tablet 6/17/2021  No Yes   Sig: Take 1 tablet (325 mg) by mouth daily (with breakfast)   fluticasone (FLONASE) 50 MCG/ACT nasal spray 6/17/2021  No Yes   Sig: Spray 1 spray into both nostrils daily   furosemide (LASIX) 40 MG tablet 6/17/2021  No Yes   Sig: Take 1 tablet (40 mg) by mouth daily   gabapentin (NEURONTIN) 300 MG capsule 6/17/2021 at pm  No Yes   Sig: Take 2 capsules (600 mg) by mouth At Bedtime   latanoprost (XALATAN) 0.005 % ophthalmic solution   No Yes   Sig: Place 1 drop into both eyes At Bedtime   metFORMIN (GLUCOPHAGE-XR) 500 MG 24 hr tablet 6/18/2021 at pm  No Yes   Sig: Take 3 tablets (1,500 mg) by mouth daily (with dinner)   metolazone (ZAROXOLYN) 5 MG tablet Patient says it has been a couple of weeks since she took this medication.  No Yes   Sig: TAKE 1 TABLET BY MOUTH DAILY AS NEEDED(WHILE WEIGHT IS ABOVE 190 POUNDS)   mycophenolate (GENERIC EQUIVALENT) 250 MG capsule 6/18/2021 at am  No Yes   Sig: Take 4 capsules (1,000 mg) by mouth 2 times daily   nystatin (MYCOSTATIN) [id number] UNIT/GM external cream   No Yes   Sig: Apply topically 2 times daily To toenails.   omeprazole (PRILOSEC) 40 MG DR capsule   No Yes   Sig: Take 1 capsule (40 mg) by mouth daily   ondansetron (ZOFRAN-ODT) 4 MG ODT tab prn, last reports taking 2 weeks ago  No No   Sig: Take 1 tablet (4 mg) by mouth every 6 hours as needed for nausea   order for DME   No No   Sig: Walker with front wheels and a seat.   order for DME   No No   Sig: Equipment being ordered: DME  [id number]   Ankle support, sm, fig 8, lace up   prazosin (MINIPRESS) 2 MG capsule 6/17/2021 at pm  No Yes   Sig: TAKE 1 CAPSULE ALONG WITH THREE 5MG CAPSULES(15MG) EVERY NIGHT AT BEDTIME FOR A TOTAL DAILY DOSE OF 17MG   prazosin (MINIPRESS) 5 MG capsule 6/17/2021 at pm  No Yes   Sig: Take 3 x 5mg (15mg) caps + 1 x 2mg caps at bedtime (total dose=17mg)   predniSONE (DELTASONE) 20 MG tablet Patient reports taking one dose  No No   Sig: Take two tablets (= 40mg)  each day for 5 (five) days   simvastatin (ZOCOR) 20 MG tablet 6/17/2021 at pm  No Yes   Sig: Take 1 tablet (20 mg) by mouth At Bedtime   sulfamethoxazole-trimethoprim (BACTRIM) 400-80 MG tablet 6/17/2021 at pm  No Yes   Sig: Take 1 tablet by mouth daily   topiramate (TOPAMAX) 200 MG tablet 6/17/2021 at pm  No Yes   Sig: TAKE 1 TABLET(200 MG) BY MOUTH TWICE DAILY   vilazodone (VIIBRYD) 40 MG TABS tablet 6/17/2021 at pm  No Yes   Sig: Take 1 tablet (40 mg) by mouth daily      Facility-Administered Medications: None     Allergies   Allergies   Allergen Reactions     Percocet [Oxycodone-Acetaminophen] Nausea and Vomiting     Novocain [Procaine Hcl] Hives     Had reaction 25 years ago to old renetta. Pt reports multiple injections of lidocaine since then without reaction.  Tolerated lidocaine injection today without difficulty.  Osmar Mark MD IR Service.       Immunization History   Immunization History   Administered Date(s) Administered     FLU 6-35 months 10/04/2015, 09/24/2016     Hep B, Peds or Adolescent 02/04/2010     HepB 02/04/2010, 03/17/2010, 08/09/2010     HepB, Unspecified 03/17/2010, 08/09/2010     Influenza (H1N1) 11/01/2009     Influenza (IIV3) PF 11/05/1999, 09/01/2008, 10/01/2009, 10/26/2011, 09/15/2012, 10/01/2013, 10/01/2014, 10/01/2015     Influenza Vaccine IM > 6 months Valent IIV4 09/25/2016, 09/29/2017, 10/01/2018, 10/03/2019, 11/04/2020     Influenza Vaccine, 6+MO IM (QUADRIVALENT W/PRESERVATIVES) 09/29/2017     Mantoux Tuberculin Skin Test 02/15/2010     Pneumococcal 23 valent 11/25/2008     TDAP Vaccine (Adacel) 01/01/2004, 10/12/2012     TDAP Vaccine (Boostrix) 10/12/2012     Tetanus 04/05/2005     Zoster vaccine recombinant adjuvanted (SHINGRIX) 10/18/2019, 01/20/2020       Social History   I have reviewed this patient's social history and updated it with pertinent information if needed. Elizabeth MATTSON Luoie  reports that she has never smoked. She has never used smokeless tobacco. She reports  that she does not drink alcohol or use drugs.    Family History   I have reviewed this patient's family history and updated it with pertinent information if needed.   Family History   Problem Relation Age of Onset     Mental Illness Other         family hx     Heart Disease Other      Diabetes Other      Cancer Other      Genetic Disorder Father      Mental Illness Father      Diabetes Father      Hypertension Father      Hyperlipidemia Mother      Diabetes Mother      Hypertension Mother      Mental Illness Other      Diabetes Other      Glaucoma No family hx of      Macular Degeneration No family hx of        Review of Systems   The 10 point Review of Systems is negative other than noted in the HPI or here.     Physical Exam   Temp: 99.1  F (37.3  C) Temp src: Oral BP: 128/71 Pulse: 58   Resp: 16 SpO2: 95 % O2 Device: None (Room air)    Vital Signs with Ranges  Temp:  [98  F (36.7  C)-99.2  F (37.3  C)] 99.1  F (37.3  C)  Pulse:  [58-67] 58  Resp:  [14-18] 16  BP: (115-129)/(64-71) 128/71  SpO2:  [95 %-99 %] 95 %  200 lbs 13.42 oz  Body mass index is 39.22 kg/m .    GENERAL APPEARANCE:  awake  EYES: Eyes grossly normal to inspection, PERRL and conjunctivae and sclerae normal  HENT: ear canals and TM's normal and nose and mouth without ulcers or lesions  NECK: no adenopathy, no asymmetry, masses, or scars and thyroid normal to palpation  RESP: lungs clear to auscultation - no rales, rhonchi or wheezes  CV: regular rates and rhythm, normal S1 S2, no S3 or S4 and no murmur, click or rub  LYMPHATICS: normal ant/post cervical and supraclavicular nodes  ABDOMEN: soft, nontender, without hepatosplenomegaly or masses and bowel sounds normal  MS: extremities normal- no gross deformities noted  SKIN: no suspicious lesions or rashes      Data   Lab Results   Component Value Date    WBC 4.9 06/19/2021    HGB 11.4 (L) 06/19/2021    HCT 35.7 06/19/2021     06/19/2021     06/19/2021    POTASSIUM 3.6 06/19/2021     CHLORIDE 111 (H) 06/19/2021    CO2 19 (L) 06/19/2021    BUN 18 06/19/2021    CR 0.98 06/19/2021     (H) 06/19/2021    SED 49 (H) 06/16/2021    NTBNP 112 10/29/2020    TROPI 0.033 09/18/2013    AST 21 06/18/2021    ALT 51 (H) 06/18/2021    ALKPHOS 94 06/18/2021    BILITOTAL 1.0 06/18/2021    INR 1.13 04/16/2014     Recent Labs   Lab 06/18/21  1455 06/18/21  1419 06/18/21  0958   CULT Cultured on the 1st day of incubation:  Gram negative rods  *  Critical Value/Significant Value, preliminary result only, called to and read back by  Francie Stevens R.N. on [facility] at 2:26pm on 06.19.2021 JT.   Cultured on the 1st day of incubation:  Escherichia coli  *  Critical Value/Significant Value, preliminary result only, called to and read back by  Francie Stevens R.N. on 06.19.2021 on [facility] at 7:56am JT.    (Note)      POSITIVE for E.COLI by Verigene multiplex nucleic acid test. Final  identification and antimicrobial susceptibility testing will be  verified by standard methods. Verigene test will not distinguish  E.coli from Shigella species including S.dysenteriae, S.flexneri,  S.boydii, and S.sonnei. Specimens containing Shigella species or  E.coli will be reported as Positive for E.coli.    Specimen tested with Verigene multiplex, gram-negative blood culture  nucleic acid test for the following targets: Acinetobacter sp.,  Citrobacter sp., Enterobacter sp., Proteus sp., E. coli, K.  pneumoniae/oxytoca, P. aeruginosa, and the following resistance  markers: CTXM, KPC, NDM, VIM, IMP and OXA.    Critical Value/Significant Value called to and read back by Francie Stevens R.N. on 06.19.2021 at 11:07am [facility] JT.         >100,000 colonies/mL  Escherichia coli  Susceptibility testing in progress  *  >100,000 colonies/mL  Strain 2  Escherichia coli  Susceptibility testing in progress  *     Recent Labs   Lab Test 06/18/21  1455 06/18/21  1419 06/18/21  0958 10/18/18  1128 09/13/18  1025 05/03/16  1700 02/17/15  0205 02/17/15  0046  05/02/14  0915   CULT Cultured on the 1st day of incubation:  Gram negative rods  *  Critical Value/Significant Value, preliminary result only, called to and read back by  Francie Stevens R.N. on [facility] at 2:26pm on 06.19.2021 JT.   Cultured on the 1st day of incubation:  Escherichia coli  *  Critical Value/Significant Value, preliminary result only, called to and read back by  Francie Stevens R.N. on 06.19.2021 on [facility] at 7:56am JT.    (Note)      POSITIVE for E.COLI by Verigene multiplex nucleic acid test. Final  identification and antimicrobial susceptibility testing will be  verified by standard methods. Verigene test will not distinguish  E.coli from Shigella species including S.dysenteriae, S.flexneri,  S.boydii, and S.sonnei. Specimens containing Shigella species or  E.coli will be reported as Positive for E.coli.    Specimen tested with Verigene multiplex, gram-negative blood culture  nucleic acid test for the following targets: Acinetobacter sp.,  Citrobacter sp., Enterobacter sp., Proteus sp., E. coli, K.  pneumoniae/oxytoca, P. aeruginosa, and the following resistance  markers: CTXM, KPC, NDM, VIM, IMP and OXA.    Critical Value/Significant Value called to and read back by Francie Stevens R.N. on 06.19.2021 at 11:07am [facility] JT.         >100,000 colonies/mL  Escherichia coli  Susceptibility testing in progress  *  >100,000 colonies/mL  Strain 2  Escherichia coli  Susceptibility testing in progress  * >100,000 colonies/mL  Escherichia coli  * DEL  Duplicate request    10,000 to 50,000 colonies/mL  mixed urogenital shantal  Susceptibility testing not routinely done   <10,000 colonies/mL Escherichia coli  <10,000 colonies/mL Aerococcus urinae  <1000 colonies/mL mixed urogenital shantal Susceptibility testing not routinely   done  * <10,000 colonies/mL mixed urogenital shantal No growth No Salmonella, Shigella, Campylobacter, E. coli O157, Aeromonas, or Plesiomonas   isolated.

## 2021-06-20 NOTE — PLAN OF CARE
VSS, on RA, A/Ox4, Ax2 walker but refuses to get OOB d/t pain, PRN Norco available for pain, PIV SL, Q2H turn and repo but occasionally refuisng, purewick in place, mod carb diet, , plan for PT to assess patient tomorrow, continue to monitor

## 2021-06-20 NOTE — PROGRESS NOTES
06/20/21 1137   Quick Adds   Type of Visit Initial PT Evaluation   Living Environment   People in home spouse   Living Environment Comments Pt lives in condo with spouse, reports no stairs all needs on main level. Pt's spouse unable to physically assist pt at DC    Self-Care   Usual Activity Tolerance moderate   Current Activity Tolerance poor   Activity/Exercise/Self-Care Comment Pt IND at baseline, has walker she reports she usually doesnt need it, not providing much detail on this    Disability/Function   Fall history within last six months no   Change in Functional Status Since Onset of Current Illness/Injury yes   General Information   Onset of Illness/Injury or Date of Surgery 06/18/21   Referring Physician Javier Glover MD    Patient/Family Therapy Goals Statement (PT) To get stronger, improve pain, go home   Pertinent History of Current Problem (include personal factors and/or comorbidities that impact the POC) 65 year old female with past medical hx of PCKD with renal transplant and DM type 2, who presents with intractable back pain.   Existing Precautions/Restrictions fall   Cognition   Orientation Status (Cognition) oriented x 3   Cognitive Status Comments Pt with very slow speech   Pain Assessment   Patient Currently in Pain Yes, see Vital Sign flowsheet  (Pt with high pain levels with OOB mobility)   Integumentary/Edema   Integumentary/Edema Comments Lidocaine patch on R lower back   Posture    Posture Forward head position   Range of Motion (ROM)   ROM Comment BLE WFL however painful with RLE hip and knee flexion, seated LAQ    Strength   Strength Comments Gross functional weakness, Pain in RLE    Bed Mobility   Comment (Bed Mobility) Supine > sitting EOB with CGA and use of logroll.    Transfers   Transfer Safety Comments STS with FWW and CGA x 1 + min A x 1   Gait/Stairs (Locomotion)   Comment (Gait/Stairs) Unable to ambulate due to pain, pt unable to tolerate WB through RLE to clear LLE  from floor    Balance   Balance Comments poor dynamic standing balance   Clinical Impression   Criteria for Skilled Therapeutic Intervention yes, treatment indicated   PT Diagnosis (PT) impaired IND with mobility from baseline   Influenced by the following impairments Functional weakness, pain, balance    Functional limitations due to impairments Pt limited by high pain levels and functional weakness, unable to ambulate at this time. Difficulty wiht bed mobility and transfers, requires A x 1 -2    Clinical Presentation Evolving/Changing   Clinical Presentation Rationale clinical judgement, level of assist    Clinical Decision Making (Complexity) moderate complexity   Therapy Frequency (PT) 5x/week   Predicted Duration of Therapy Intervention (days/wks) 1 week   Planned Therapy Interventions (PT) balance training;bed mobility training;gait training;home exercise program;strengthening;transfer training;patient/family education   Risk & Benefits of therapy have been explained evaluation/treatment results reviewed;care plan/treatment goals reviewed;risks/benefits reviewed;patient   PT Discharge Planning    PT Discharge Recommendation (DC Rec) Transitional Care Facility   PT Rationale for DC Rec At this time recommend TCU as pt currently requires CGA-min A of 1-2. Pt limited by pain however received pain meds prior to session. pt requires CGA bed mobility, CGA + Min A STS with FWW. Pt unable to tolerate marching in place or ambulation due to pain in low back and radiating down RLE. Limited tolerance for OOB mobility. Will require TCu stay to improve IND and safety with mobility as pt far below baseline at this time. Pt's spouse unable to physically assist pt with mobility    Total Evaluation Time   Total Evaluation Time (Minutes) 15

## 2021-06-20 NOTE — PLAN OF CARE
Patient is A&O x4. Up with assist of 2/gb/walker. Turn and repo per pt comfort. Encourage activity. Denies chest pain or SOB. Pain managed with lidocaine patch, oxycodone and scheduled tylenol. Pure wick in place with adequate output. Will continue to monitor.

## 2021-06-20 NOTE — PLAN OF CARE
Pt AO4, very slow speech.  A2 GB W but has been refusing to get up previous shifts due to pain.  Pt did not complain of any pain this shift nor did she ask for pain meds.  Turn Q2-4 hours when pt allows. Purewick in place.  BS checks.  Plan for PT to see today.  Slept in between cares and no complaints throughout shift.     Pt was able to stand A1 this AM while the aid changed her bed linens- still no complaints of pain

## 2021-06-20 NOTE — PROGRESS NOTES
[facility]    Medicine Progress Note - Hospitalist Service        Date of Admission:  6/18/2021  9:24 AM    Assessment & Plan:     65 year old female with past medical hx of PCKD with renal transplant and DM type 2, who presents with intractable back pain.    E. coli bacteremia  Urinary tract infection  -Patient presenting with intractable back pain, UA was abnormal, urine culture growing 2 strains of E. coli  -Blood culture from 6/18 positive for E. coli  -Continue ceftriaxone 2 g IV daily  -ID consulted    Intractable back pain   Degenerative Disc Desease L5-S1  Elevated CRP  -Patient does have history of chronic back pain which got worse in the last week or so  -Pain is midline with radiation down to the right heel  -MRI of the lumbar spine does not show any evidence of infection, moderate L5-S1 degenerative changes with moderate bilateral neural foraminal stenosis  -Continues to have ongoing back pain despite only having moderate degenerative changes  -Await physical therapy evaluation, add Lidoderm patch and scheduled Tylenol  -If continues to have ongoing pain, will ask spine surgery to evaluate tomorrow morning  -I suspect her current clinical worsening is probably related to UTI and the bacteremia    Essential hypertension  -Blood pressure on the low normal side, hold prior to admission antihypertensives     DM type 2 with Neuropathy  -Hemoglobin A1c 6.9  -Continue prior to admission Metformin  -Medium intensity sliding scale    PCKD, S/P Renal Transplant 2014 ([facility])  -mycophenolate and cyclosporine levels pending  -Continue prior to admission mycophenolate and cyclosporine                  Diet: Combination Diet 5224-3020 Calories: Moderate Consistent CHO (4-6 CHO units/meal)     DVT Prophylaxis: Pneumatic Compression Devices   Collier Catheter: not present  Code Status: Full Code     Disposition Plan    Expected discharge: 2 - 3 days, recommended to TBD; switched to inpatient  today.    Entered: Evans Lanier MD 06/20/2021, 8:13 AM        The patient's care was discussed with the Bedside Nurse and Patient.    Evans Lanier MD  Hospitalist Service  [facility]    ______________________________________________________________________    Interval History   Still having ongoing back pain up about 8/10 in intensity with movement.  Afebrile.  Blood culture +2/2 sets.  Denies abdominal pain.    Data reviewed today: I reviewed all medications, new labs and imaging results over the last 24 hours. I personally reviewed no images or EKG's today.    Physical Exam   Vital signs:  Temp: 99.1  F (37.3  C) Temp src: Oral BP: 128/71 Pulse: 58   Resp: 18 SpO2: 95 % O2 Device: None (Room air)   Height: 152.4 cm (5') Weight: 91.1 kg (200 lb 13.4 oz)  Estimated body mass index is 39.22 kg/m  as calculated from the following:    Height as of this encounter: 1.524 m (5').    Weight as of this encounter: 91.1 kg (200 lb 13.4 oz).      Wt Readings from Last 2 Encounters:   06/20/21 91.1 kg (200 lb 13.4 oz)   03/29/21 88.9 kg (196 lb)       Gen: AAOX3, NAD  HEENT: no pallor  Resp: CTA B/L, normal WOB  CVS: RRR, no murmur  Abd/GI: Soft, non-tender. BS- normoactive.    Skin: Warm, dry no rashes  MSK: Tender over lower lumbar spine, still with decreased SLR T on the right side-about 30 degrees  Neuro- CN- intact.         Data   Recent Labs   Lab 06/19/21  0748 06/18/21  0957 06/16/21  2145 06/15/21  1527   WBC 4.9 6.8 7.4 5.7   HGB 11.4* 13.0 13.7 13.2   MCV 89 90 89 90    176 177 144*    139 136 137   POTASSIUM 3.6 4.2 3.8 3.7   CHLORIDE 111* 111* 105 107   CO2 19* 20 20 24   BUN 18 22 19 20   CR 0.98 1.03 1.24* 1.22*   ANIONGAP 9 8 11 6   MARY 8.3* 9.2 9.8 9.1   * 161* 203* 193*   ALBUMIN  --  2.8*  --  3.3*   PROTTOTAL  --  6.8  --  6.8   BILITOTAL  --  1.0  --  0.8   ALKPHOS  --  94  --  53   ALT  --  51*  --  22   AST  --  21  --  8       Recent Results (from the past  24 hour(s))   MR Lumbar Spine w/o & w Contrast    Narrative    MRI OF THE LUMBAR SPINE WITHOUT AND WITH CONTRAST  6/19/2021 11:39 AM     HISTORY: Low back pain, infection suspected.    TECHNIQUE: Multiplanar, multisequence MRI images of the lumbar spine  were acquired without and with 10 mL Gadavist IV contrast.    COMPARISON: None.    FINDINGS: There are five lumbar-type vertebrae for the purposes of  this dictation.     Normal vertebral body heights, alignment and marrow signal. The conus  tip is identified at L1. Polycystic kidneys. Left adrenal presumed  adenoma. No abnormal contrast enhancement.    T12-L1: Normal disc height and signal. No herniation. Normal facets.  No spinal canal or neural foraminal stenosis.    L1-L2: Normal disc height and signal. No herniation. Normal facets. No  spinal canal or neural foraminal stenosis.    L2-L3: Normal disc height and signal. No herniation. Normal facets. No  spinal canal or neural foraminal stenosis.    L3-L4: Normal disc height and signal. No herniation. Normal facets. No  spinal canal or neural foraminal stenosis.    L4-L5: Normal disc height. Mild disc T2 signal loss. Mild posterior  annular bulge. Mild bilateral facet arthropathy. No spinal canal or  neural foraminal stenosis.    L5-S1: Moderate disc height and T2 signal loss. Mild circumferential  disc osteophyte complex. Mild bilateral facet arthropathy. No spinal  canal stenosis. Moderate bilateral neural foraminal stenoses.      Impression    IMPRESSION:  1.  No evidence of infection.  2.  Moderate L5-S1 degenerative change with moderate bilateral neural  foraminal stenoses.  3.  Polycystic kidney disease.  4.  Left adrenal presumed adenoma.  5.  No change.    RUDI MACHADO MD     Medications       ARIPiprazole  3 mg Oral BID     cefTRIAXone  2 g Intravenous Q24H     cycloSPORINE modified  125 mg Oral BID     [Held by provider] furosemide  40 mg Oral Daily     gabapentin  600 mg Oral At Bedtime     insulin  aspart  1-7 Units Subcutaneous TID AC     insulin aspart  1-5 Units Subcutaneous At Bedtime     latanoprost  1 drop Both Eyes At Bedtime     metFORMIN  1,500 mg Oral Daily with supper     mycophenolate  1,000 mg Oral BID     senna-docusate  1 tablet Oral BID    Or     senna-docusate  2 tablet Oral BID     simvastatin  20 mg Oral At Bedtime     sodium chloride (PF)  3 mL Intracatheter Q8H     sulfamethoxazole-trimethoprim  1 tablet Oral Daily     topiramate  200 mg Oral BID     vilazodone  40 mg Oral Daily

## 2021-06-20 NOTE — PLAN OF CARE
A&Ox4. VSS on RA except soft BP at times. Did not get out of bed this shift. Refused PT this afternoon. Turned/repo every 2 hours/as tolerated. C/o back pain, given PRN Norco x 1 with decrease in pain. IV abx. Purewick in place. No BM this shift. +BS. Moderate carb diet, BG checks. IV SL. MRI lumbar spine completed. Nursing will continue to monitor.

## 2021-06-21 ENCOUNTER — E-CONSULT (OUTPATIENT)
Dept: RHEUMATOLOGY | Facility: CLINIC | Age: 66
End: 2021-06-21

## 2021-06-21 ENCOUNTER — APPOINTMENT (OUTPATIENT)
Dept: PHYSICAL THERAPY | Facility: CLINIC | Age: 66
DRG: 699 | End: 2021-06-21
Payer: MEDICARE

## 2021-06-21 LAB
BACTERIA SPEC CULT: ABNORMAL
GLUCOSE BLDC GLUCOMTR-MCNC: 153 MG/DL (ref 70–99)
GLUCOSE BLDC GLUCOMTR-MCNC: 155 MG/DL (ref 70–99)
GLUCOSE BLDC GLUCOMTR-MCNC: 158 MG/DL (ref 70–99)
GLUCOSE BLDC GLUCOMTR-MCNC: 178 MG/DL (ref 70–99)
GLUCOSE BLDC GLUCOMTR-MCNC: 184 MG/DL (ref 70–99)
MYCOPHENOLATE SERPL LC/MS/MS-MCNC: 0.55 MG/L (ref 1–3.5)
MYCOPHENOLATE-G SERPL LC/MS/MS-MCNC: 51.5 MG/L (ref 30–95)
SPECIMEN SOURCE: ABNORMAL
SPECIMEN SOURCE: ABNORMAL
TME LAST DOSE: ABNORMAL H

## 2021-06-21 PROCEDURE — 999N001017 HC STATISTIC GLUCOSE BY METER IP

## 2021-06-21 PROCEDURE — 99232 SBSQ HOSP IP/OBS MODERATE 35: CPT | Performed by: INTERNAL MEDICINE

## 2021-06-21 PROCEDURE — 97116 GAIT TRAINING THERAPY: CPT | Mod: GP

## 2021-06-21 PROCEDURE — 250N000011 HC RX IP 250 OP 636: Performed by: INTERNAL MEDICINE

## 2021-06-21 PROCEDURE — 250N000013 HC RX MED GY IP 250 OP 250 PS 637: Performed by: INTERNAL MEDICINE

## 2021-06-21 PROCEDURE — 250N000012 HC RX MED GY IP 250 OP 636 PS 637: Performed by: INTERNAL MEDICINE

## 2021-06-21 PROCEDURE — 99451 NTRPROF PH1/NTRNET/EHR 5/>: CPT | Performed by: INTERNAL MEDICINE

## 2021-06-21 PROCEDURE — 120N000001 HC R&B MED SURG/OB

## 2021-06-21 PROCEDURE — 97530 THERAPEUTIC ACTIVITIES: CPT | Mod: GP

## 2021-06-21 RX ORDER — PRAZOSIN HYDROCHLORIDE 5 MG/1
15 CAPSULE ORAL AT BEDTIME
Status: DISCONTINUED | OUTPATIENT
Start: 2021-06-21 | End: 2021-06-22 | Stop reason: HOSPADM

## 2021-06-21 RX ADMIN — CYCLOSPORINE 125 MG: 100 CAPSULE, LIQUID FILLED ORAL at 21:02

## 2021-06-21 RX ADMIN — TOPIRAMATE 200 MG: 200 TABLET, FILM COATED ORAL at 08:28

## 2021-06-21 RX ADMIN — ARIPIPRAZOLE 3 MG: 2 TABLET ORAL at 21:02

## 2021-06-21 RX ADMIN — INSULIN ASPART 1 UNITS: 100 INJECTION, SOLUTION INTRAVENOUS; SUBCUTANEOUS at 12:30

## 2021-06-21 RX ADMIN — GABAPENTIN 600 MG: 300 CAPSULE ORAL at 21:51

## 2021-06-21 RX ADMIN — ACETAMINOPHEN 975 MG: 325 TABLET, FILM COATED ORAL at 13:33

## 2021-06-21 RX ADMIN — SENNOSIDES AND DOCUSATE SODIUM 2 TABLET: 8.6; 5 TABLET ORAL at 21:02

## 2021-06-21 RX ADMIN — SULFAMETHOXAZOLE AND TRIMETHOPRIM 1 TABLET: 400; 80 TABLET ORAL at 08:28

## 2021-06-21 RX ADMIN — CYCLOSPORINE 125 MG: 100 CAPSULE, LIQUID FILLED ORAL at 08:28

## 2021-06-21 RX ADMIN — ARIPIPRAZOLE 3 MG: 2 TABLET ORAL at 08:28

## 2021-06-21 RX ADMIN — MYCOPHENOLATE MOFETIL 1000 MG: 250 CAPSULE ORAL at 08:28

## 2021-06-21 RX ADMIN — CEFTRIAXONE SODIUM 2 G: 2 INJECTION, POWDER, FOR SOLUTION INTRAMUSCULAR; INTRAVENOUS at 16:43

## 2021-06-21 RX ADMIN — ACETAMINOPHEN 975 MG: 325 TABLET, FILM COATED ORAL at 05:03

## 2021-06-21 RX ADMIN — MYCOPHENOLATE MOFETIL 1000 MG: 250 CAPSULE ORAL at 21:01

## 2021-06-21 RX ADMIN — METFORMIN HYDROCHLORIDE 1500 MG: 750 TABLET, EXTENDED RELEASE ORAL at 18:25

## 2021-06-21 RX ADMIN — INSULIN ASPART 1 UNITS: 100 INJECTION, SOLUTION INTRAVENOUS; SUBCUTANEOUS at 09:27

## 2021-06-21 RX ADMIN — PRAZOSIN HYDROCHLORIDE 15 MG: 5 CAPSULE ORAL at 21:51

## 2021-06-21 RX ADMIN — SIMVASTATIN 20 MG: 20 TABLET, FILM COATED ORAL at 21:52

## 2021-06-21 RX ADMIN — INSULIN ASPART 1 UNITS: 100 INJECTION, SOLUTION INTRAVENOUS; SUBCUTANEOUS at 18:27

## 2021-06-21 RX ADMIN — ACETAMINOPHEN 975 MG: 325 TABLET, FILM COATED ORAL at 21:52

## 2021-06-21 RX ADMIN — VILAZODONE HYDROCHLORIDE 40 MG: 20 TABLET ORAL at 08:28

## 2021-06-21 RX ADMIN — SENNOSIDES AND DOCUSATE SODIUM 2 TABLET: 8.6; 5 TABLET ORAL at 08:28

## 2021-06-21 RX ADMIN — LATANOPROST 1 DROP: 50 SOLUTION/ DROPS OPHTHALMIC at 22:09

## 2021-06-21 RX ADMIN — LIDOCAINE 1 PATCH: 560 PATCH PERCUTANEOUS; TOPICAL; TRANSDERMAL at 08:27

## 2021-06-21 RX ADMIN — TOPIRAMATE 200 MG: 200 TABLET, FILM COATED ORAL at 21:02

## 2021-06-21 ASSESSMENT — ACTIVITIES OF DAILY LIVING (ADL)
ADLS_ACUITY_SCORE: 20
ADLS_ACUITY_SCORE: 16
ADLS_ACUITY_SCORE: 16
ADLS_ACUITY_SCORE: 20
ADLS_ACUITY_SCORE: 16
ADLS_ACUITY_SCORE: 18

## 2021-06-21 NOTE — PLAN OF CARE
Patient A&0x4. Speech is slow at times.  C/o low back pain. Pt stated she feels like she can do more today, able to push butt up for us to get brief on. Up with 2 to pivot to BSC. Weak dorsal and plantar flexion. C/o baseline numbness in both feet and hands at baseline. Dp pulses +1. Diabetic diet. Discharge to TCU pending

## 2021-06-21 NOTE — PROGRESS NOTES
ALL SMARTFIELDS MUST BE COMPLETED FOR PATIENT CARE AND BILLING    6/21/2021     E-Consult has been accepted.    Interprofessional consultation requested by:  Tammy Parsons APRN CNP      Clinical Question/Purpose: MY CLINICAL QUESTION IS: markedly elevated CRP (190) in patient with history of kidney transplant, new significant fatigue and widespread body pain, concern for possible polymyalgia rheumatica    Patient assessment and information reviewed: I note she was hospitalized shortly after this E consult was placed with what appears to be E. coli bacteremia, which would be highly likely to give her a very elevated CRP.    To the extent that she has residual symptoms of hip and shoulder girdle arthralgias/myalgias following successful antibiotic treatment, it may be worth a follow-up CRP together with ESR.  Those should both normalize following infection.  CRP will normalize more quickly than ESR.  If they do not and she has symptoms localized to the hip and shoulder girdle a brief course of low-dose prednisone starting at 20 mg daily for 3 to 5 days would be expected to give essentially complete relief of those symptoms were due to PMR.    If most of her diffuse symptoms resolve after treatment of the infection I would not make a diagnosis of PMR.    Recommendations: See above      The recommendations provided in this E-Consult are based on the clinical data available to me at this time, and are furnished without the benefit of a comprehensive in-person or virtual patient evaluation, Any new clinical issues or changes in patient status since the filing of this E-Consult will need to be taken into account when assessing these recommendations. Please contact me if you have further questions.    My total time spent reviewing clinical information and formulating assessment was 5 minutes.    Report sent automatically to requesting provider once signed.     Charge codes - [id number] (5+ minutes) or [id number] (No  charge code)    Barak Guidry MD

## 2021-06-21 NOTE — PLAN OF CARE
Patient A&0x4. Speech is slow at times.  X/o low back pain with radiation down Right leg. States she is feeling much better today. Up with 2 to pivot to BSC. Weak dorsal and plantar flexion. XC/o baseline numbness in both feet and hands at baseline. Dp pulses +1. Full code. Diabetic diet. Blood sugars were 155 and 150.

## 2021-06-21 NOTE — PROGRESS NOTES
[facility]    Infectious Disease Progress Note    Date of Service (when I saw the patient): 06/21/2021     Assessment & Plan   Elizabeth Palma is a 65 year old female who was admitted on 6/18/2021.      Impression:  1. 65 y.o female with PCKD.   2. History of renal transplant.   3. Diabetes   4. On bactrim for PJP prophylaxis.   5. Admitted this occasion with urosepsis.   6. Blood cultures with E coli, UC also with E coli.   7. On ceftriaxone organism is sensitive.      Recommendations:   1. Continue on ceftriaxone. da4 4/ 14 of antibiotics today, can be switched to oral ceftin when ready for discharge continue on ceftriaxone while admitted   2. MRI of the lumbar spine does not show any evidence of infection, moderate L5-S1 degenerative changes with moderate bilateral neural foraminal stenosis              Tali Cavazos MD    Interval History   Afebrile   Back pain is better   Physical Exam   Temp: 97.7  F (36.5  C) Temp src: Oral BP: (!) 143/72 Pulse: 82   Resp: 16 SpO2: 100 % O2 Device: None (Room air)    Vitals:    06/18/21 0944 06/20/21 0500   Weight: 88 kg (194 lb) 91.1 kg (200 lb 13.4 oz)     Vital Signs with Ranges  Temp:  [97.3  F (36.3  C)-98  F (36.7  C)] 97.7  F (36.5  C)  Pulse:  [51-97] 82  Resp:  [16] 16  BP: (113-143)/(64-72) 143/72  SpO2:  [96 %-100 %] 100 %    Constitutional: Awake, alert, cooperative, no apparent distress  Lungs: Clear to auscultation bilaterally, no crackles or wheezing  Cardiovascular: Regular rate and rhythm, normal S1 and S2, and no murmur noted  Abdomen: Normal bowel sounds, soft, non-distended, non-tender  Skin: No rashes, no cyanosis, no edema  Other:    Medications       acetaminophen  975 mg Oral Q8H TIN     ARIPiprazole  3 mg Oral BID     cefTRIAXone  2 g Intravenous Q24H     cycloSPORINE modified  125 mg Oral BID     [Held by provider] furosemide  40 mg Oral Daily     gabapentin  600 mg Oral At Bedtime     insulin aspart  1-7 Units Subcutaneous  TID AC     insulin aspart  1-5 Units Subcutaneous At Bedtime     latanoprost  1 drop Both Eyes At Bedtime     lidocaine  1 patch Transdermal Q24H     lidocaine   Transdermal Q8H     metFORMIN  1,500 mg Oral Daily with supper     mycophenolate  1,000 mg Oral BID     senna-docusate  1 tablet Oral BID    Or     senna-docusate  2 tablet Oral BID     simvastatin  20 mg Oral At Bedtime     sodium chloride (PF)  3 mL Intracatheter Q8H     sulfamethoxazole-trimethoprim  1 tablet Oral Daily     topiramate  200 mg Oral BID     vilazodone  40 mg Oral Daily       Data   All microbiology laboratory data reviewed.  Recent Labs   Lab Test 06/19/21  0748 06/18/21  0957 06/16/21  2145   WBC 4.9 6.8 7.4   HGB 11.4* 13.0 13.7   HCT 35.7 40.4 42.4   MCV 89 90 89    176 177     Recent Labs   Lab Test 06/19/21  0748 06/18/21  0957 06/16/21  2145   CR 0.98 1.03 1.24*     Recent Labs   Lab Test 06/16/21  2145   SED 49*     Recent Labs   Lab Test 06/18/21  1455 06/18/21  1419 06/18/21  0958 10/18/18  1128 09/13/18  1025 05/03/16  1700 02/17/15  0205 02/17/15  0046 05/02/14  0915   CULT Cultured on the 1st day of incubation:  Escherichia coli  Susceptibility testing done on previous specimen  *  Critical Value/Significant Value, preliminary result only, called to and read back by  Francieaustin Stevens R.N. on [facility] at 2:26pm on 06.19.2021 JT.   Cultured on the 1st day of incubation:  Escherichia coli  *  Critical Value/Significant Value, preliminary result only, called to and read back by  Francie Beste R.N. on 06.19.2021 on 88 at 7:56am JT.    (Note)      POSITIVE for E.COLI by Verigene multiplex nucleic acid test. Final  identification and antimicrobial susceptibility testing will be  verified by standard methods. Verigene test will not distinguish  E.coli from Shigella species including S.dysenteriae, S.flexneri,  S.boydii, and S.sonnei. Specimens containing Shigella species or  E.coli will be reported as Positive for  E.coli.    Specimen tested with [employer] multiplex, gram-negative blood culture  nucleic acid test for the following targets: Acinetobacter sp.,  Citrobacter sp., Enterobacter sp., Proteus sp., E. coli, K.  pneumoniae/oxytoca, P. aeruginosa, and the following resistance  markers: CTXM, KPC, NDM, VIM, IMP and OXA.    Critical Value/Significant Value called to and read back by Francie Stevens R.N. on 06.19.2021 at 11:07am SH88 JT.         >100,000 colonies/mL  Escherichia coli  *  >100,000 colonies/mL  Strain 2  Escherichia coli  * >100,000 colonies/mL  Escherichia coli  * DEL  Duplicate request    10,000 to 50,000 colonies/mL  mixed urogenital shantal  Susceptibility testing not routinely done   <10,000 colonies/mL Escherichia coli  <10,000 colonies/mL Aerococcus urinae  <1000 colonies/mL mixed urogenital shantal Susceptibility testing not routinely   done  * <10,000 colonies/mL mixed urogenital shantal No growth No Salmonella, Shigella, Campylobacter, E. coli O157, Aeromonas, or Plesiomonas   isolated.         Attestation:  Total time on the floor involved in the patient's care: 35 minutes. Total time spent in counseling/care coordination: >50%

## 2021-06-21 NOTE — PLAN OF CARE
[id number]: Pt AO4, slow speech some times.  Using percocet for pain.  VSS on RA.  SL.  Mod cho diet.  BS checks.  Plan for either discharge home if she works with PT and does well or TCU.  Per MD should be medically ready to leave tomorrow. Strength in BLE's improved from yesterday.     PT now recommending home instead of TCU.

## 2021-06-22 ENCOUNTER — TELEPHONE (OUTPATIENT)
Dept: TRANSPLANT | Facility: CLINIC | Age: 66
End: 2021-06-22

## 2021-06-22 VITALS
HEIGHT: 60 IN | RESPIRATION RATE: 16 BRPM | WEIGHT: 202.6 LBS | DIASTOLIC BLOOD PRESSURE: 63 MMHG | OXYGEN SATURATION: 96 % | SYSTOLIC BLOOD PRESSURE: 122 MMHG | BODY MASS INDEX: 39.78 KG/M2 | HEART RATE: 50 BPM | TEMPERATURE: 97.5 F

## 2021-06-22 DIAGNOSIS — Z48.298 AFTERCARE FOLLOWING ORGAN TRANSPLANT: Primary | ICD-10-CM

## 2021-06-22 DIAGNOSIS — Z94.0 KIDNEY TRANSPLANTED: ICD-10-CM

## 2021-06-22 LAB
GLUCOSE BLDC GLUCOMTR-MCNC: 145 MG/DL (ref 70–99)
GLUCOSE BLDC GLUCOMTR-MCNC: 150 MG/DL (ref 70–99)

## 2021-06-22 PROCEDURE — 99239 HOSP IP/OBS DSCHRG MGMT >30: CPT | Performed by: INTERNAL MEDICINE

## 2021-06-22 PROCEDURE — 999N001017 HC STATISTIC GLUCOSE BY METER IP

## 2021-06-22 PROCEDURE — 250N000012 HC RX MED GY IP 250 OP 636 PS 637: Performed by: INTERNAL MEDICINE

## 2021-06-22 PROCEDURE — 250N000013 HC RX MED GY IP 250 OP 250 PS 637: Performed by: INTERNAL MEDICINE

## 2021-06-22 RX ORDER — CEFUROXIME AXETIL 500 MG/1
500 TABLET ORAL 2 TIMES DAILY
Qty: 18 TABLET | Refills: 0 | Status: SHIPPED | OUTPATIENT
Start: 2021-06-22 | End: 2021-07-01

## 2021-06-22 RX ADMIN — MYCOPHENOLATE MOFETIL 1000 MG: 250 CAPSULE ORAL at 10:09

## 2021-06-22 RX ADMIN — ACETAMINOPHEN 975 MG: 325 TABLET, FILM COATED ORAL at 06:39

## 2021-06-22 RX ADMIN — CYCLOSPORINE 125 MG: 100 CAPSULE, LIQUID FILLED ORAL at 10:09

## 2021-06-22 RX ADMIN — TOPIRAMATE 200 MG: 200 TABLET, FILM COATED ORAL at 10:10

## 2021-06-22 RX ADMIN — ARIPIPRAZOLE 3 MG: 2 TABLET ORAL at 10:10

## 2021-06-22 RX ADMIN — VILAZODONE HYDROCHLORIDE 40 MG: 20 TABLET ORAL at 10:10

## 2021-06-22 RX ADMIN — FUROSEMIDE 40 MG: 40 TABLET ORAL at 10:10

## 2021-06-22 RX ADMIN — LIDOCAINE 1 PATCH: 560 PATCH PERCUTANEOUS; TOPICAL; TRANSDERMAL at 10:09

## 2021-06-22 RX ADMIN — SULFAMETHOXAZOLE AND TRIMETHOPRIM 1 TABLET: 400; 80 TABLET ORAL at 10:10

## 2021-06-22 ASSESSMENT — ACTIVITIES OF DAILY LIVING (ADL)
ADLS_ACUITY_SCORE: 16

## 2021-06-22 ASSESSMENT — MIFFLIN-ST. JEOR: SCORE: 1395.5

## 2021-06-22 NOTE — PROGRESS NOTES
Care Management Discharge Note    Discharge Date:         Discharge Disposition: Home Care    Discharge Services:  Home PT    Discharge DME: Walker    Discharge Transportation: family or friend will provide    Private pay costs discussed: Not applicable    PAS Confirmation Code:    Patient/family educated on Medicare website which has current facility and service quality ratings: yes    Education Provided on the Discharge Plan:    Persons Notified of Discharge Plans: Patient and her   Patient/Family in Agreement with the Plan:      Handoff Referral Completed: No    Additional Information:  Met with patient and her  at bedside.  Informed patient [employer] Can start PT within 48 hours and their phone number will be on the discharge AVS.   here and able to give a ride.  Patient is awaiting her Rx to be filled, and will discharge once Rx is ready.  [place]. [phone]        Bobbi Gonzales RN

## 2021-06-22 NOTE — PLAN OF CARE
Physical Therapy Discharge Summary    Reason for therapy discharge:    Discharged to home with home therapy.    Progress towards therapy goal(s). See goals on Care Plan in Psychiatric electronic health record for goal details.  Goals not met.  Barriers to achieving goals:   discharge from facility.    Therapy recommendation(s):    Continued therapy is recommended.  Rationale/Recommendations:  Home PT to progress mobility.

## 2021-06-22 NOTE — DISCHARGE SUMMARY
[facility]    Discharge Summary  Hospitalist    Date of Admission:  6/18/2021  Date of Discharge:  6/22/2021  Discharging Provider: Evans Lanier MD    Discharge Diagnoses      E. coli bacteremia  Urinary tract infection with E. coli  Intractable back pain due to degenerative Disc Desease-improved  Elevated CRP  Essential hypertension  DM type 2 with Neuropathy  PCKD, S/P Renal Transplant 2014 ([facility])         Hospital Course:    65 year old female with past medical hx of PCKD with renal transplant and DM type 2, who presents with intractable back pain.     E. coli bacteremia  Urinary tract infection with E. coli  -Patient presenting with intractable back pain, UA was abnormal, urine culture growing 2 strains of E. coli  -Blood culture from 6/18 positive for E. coli  -Initially on ceftriaxone 2 g IV daily, received 5 days of IV ceftriaxone in the hospital, followed by infectious disease, will transition to oral Ceftin for 9 more days to complete a 2 weeks course at the time of discharge.    Intractable back pain   Degenerative Disc Desease L5-S1  Elevated CRP  -Patient does have history of chronic back pain which got worse in the last week or so  -Pain is midline with radiation down to the right heel  -MRI of the lumbar spine does not show any evidence of infection, moderate L5-S1 degenerative changes with moderate bilateral neural foraminal stenosis  -I suspect her back pain was definitely made worse by the infectious/septic process.  As the infection was treated her back pain also improved.  It is not unreasonable to see spine surgeon as an outpatient for her degenerative disc disease but for now symptoms are much much better  -Evaluated by physical therapy, she was progressing therefore now recommending home with PT.     Essential hypertension  -Continue prior to admission prazosin which she primarily takes for her history of PTSD     DM type 2 with Neuropathy  -Hemoglobin A1c  6.9  -Continue prior to admission Metformin     PCKD, S/P Renal Transplant 2014 ([facility])  -Mycophenolate level was low at 0.55.  This was discussed with Lorelei, transplant coordinator at [facility].  She recommended continuing current dose of mycophenolate, rechecking mycophenolate level again in 1 week as outpatient and adjusting the dose if it continues to be low.                  Evans Lanier MD    Significant Results and Procedures   See below    Pending Results      Unresulted Labs Ordered in the Past 30 Days of this Admission     No orders found from 5/19/2021 to 6/19/2021.          Code Status   Full Code       Primary Care Physician   Horacio Sheridan    Physical Exam   Temp: 97.5  F (36.4  C) Temp src: Oral BP: 122/63 Pulse: 50   Resp: 16 SpO2: 96 % O2 Device: None (Room air)      Constitutional: AAOX3, NAD  Respiratory: CTA B/L, Normal WOB  Cardiovascular: RRR, No murmur  GI: Soft, Non- tender, BS- normoactive  Skin/Integument: Warm and dry, no rashes  MSK: Lower lumbar spine with minimal tenderness, SLRT on the right side now around 60 degrees  Neuro: CN- grossly intact, no focal deficit    Discharge Disposition   Discharged to home  Condition at discharge: Stable    Consultations This Hospital Stay   PHYSICAL THERAPY ADULT IP CONSULT  INFECTIOUS DISEASES IP CONSULT  CARE MANAGEMENT / SOCIAL WORK IP CONSULT    Time Spent on this Encounter   I, Evans Lanier MD, personally saw the patient today and spent greater than 30 minutes discharging this patient.    Discharge Orders      Home Care PT Referral for Hospital Discharge      Follow-up and recommended labs and tests    Follow up with primary care provider, Horacio Sheridan, within 7 days for hospital follow- up.   Spine surgery in 2-3 weeks     Activity    Your activity upon discharge: activity as tolerated     MD face to face encounter    Documentation of Face to Face and Certification for Home Health Services    I certify that patient:  Elizabeth Palma is under my care and that I, or a nurse practitioner or physician's assistant working with me, had a face-to-face encounter that meets the physician face-to-face encounter requirements with this patient on: 6/22/2021.    This encounter with the patient was in whole, or in part, for the following medical condition, which is the primary reason for home health care: Deconditioning.    I certify that, based on my findings, the following services are medically necessary home health services: Nursing.    My clinical findings support the need for the above services because: Nurse is needed: To provide assessment and oversight required in the home to assure adherence to the medical plan due to: Deconditioning.    Further, I certify that my clinical findings support that this patient is homebound (i.e. absences from home require considerable and taxing effort and are for medical reasons or Muslim services or infrequently or of short duration when for other reasons) because: Requires assistance of another person or specialized equipment to access medical services because patient:  Deconditioning.    Based on the above findings. I certify that this patient is confined to the home and needs intermittent skilled nursing care, physical therapy and/or speech therapy.  The patient is under my care, and I have initiated the establishment of the plan of care.  This patient will be followed by a physician who will periodically review the plan of care.  Physician/Provider to provide follow up care: Horacio Sheridan    Attending hospital physician (the Medicare certified PECOS provider): Evans Lanier MD  Physician Signature: See electronic signature associated with these discharge orders.  Date: 6/22/2021     Full Code     Diet    Follow this diet upon discharge: Orders Placed This Encounter      Combination Diet 3210-8520 Calories: Moderate Consistent CHO (4-6 CHO units/meal)       Discharge Medications   Current  Discharge Medication List      START taking these medications    Details   cefuroxime (CEFTIN) 500 MG tablet Take 1 tablet (500 mg) by mouth 2 times daily for 9 days  Qty: 18 tablet, Refills: 0    Associated Diagnoses: E coli bacteremia         CONTINUE these medications which have NOT CHANGED    Details   acetaminophen (TYLENOL) 500 MG tablet Take 1,500 mg by mouth every 6 hours as needed for mild pain      ACETYLCYSTEINE PO Take 2 capsules by mouth daily (Patient does not know what strength they are taking.)      ARIPiprazole (ABILIFY) 2 MG tablet Take 1.5 tablets (3 mg) by mouth daily  Qty: 135 tablet, Refills: 1    Associated Diagnoses: Major depressive disorder, recurrent episode, moderate (H)      aspirin EC 81 MG EC tablet Take 1 tablet (81 mg) by mouth daily  Qty: 30 tablet, Refills: 5    Associated Diagnoses: Kidney replaced by transplant      Cholecalciferol (VITAMIN D) 1000 UNITS capsule 1,000 Units   Qty: 30 capsule      cyanocolbalamin (VITAMIN  B-12) 1000 MCG tablet Take 1 tablet by mouth daily.  Qty: 30 tablet, Refills: 0    Associated Diagnoses: CKD (chronic kidney disease) stage 5, GFR less than 15 ml/min (H)      cycloSPORINE modified (GENERIC EQUIVALENT) 25 MG capsule Take 5 capsules (125 mg) by mouth 2 times daily TAKE 5 CAPSULES (125MG) BY MOUTH TWO TIMES A DAY  Qty: 300 capsule, Refills: 11    Associated Diagnoses: Kidney replaced by transplant      ferrous sulfate (FEROSUL) 325 (65 Fe) MG tablet Take 1 tablet (325 mg) by mouth daily (with breakfast)  Qty: 100 tablet, Refills: 0    Comments: Refills should be available for transfer from [facility] pharmacy in [place]  Associated Diagnoses: Iron deficiency      fluticasone (FLONASE) 50 MCG/ACT nasal spray Spray 1 spray into both nostrils daily  Qty: 48 g, Refills: 3    Associated Diagnoses: Seasonal allergic rhinitis      furosemide (LASIX) 40 MG tablet Take 1 tablet (40 mg) by mouth daily  Qty: 90 tablet, Refills: 3    Associated Diagnoses:  Essential hypertension; Edema, unspecified type      gabapentin (NEURONTIN) 300 MG capsule Take 2 capsules (600 mg) by mouth At Bedtime  Qty: 180 capsule, Refills: 3    Associated Diagnoses: Type 2 diabetes mellitus with diabetic polyneuropathy, without long-term current use of insulin (H)      latanoprost (XALATAN) 0.005 % ophthalmic solution Place 1 drop into both eyes At Bedtime  Qty: 7.5 mL, Refills: 2    Associated Diagnoses: Mild stage glaucoma      metFORMIN (GLUCOPHAGE-XR) 500 MG 24 hr tablet Take 3 tablets (1,500 mg) by mouth daily (with dinner)  Qty: 270 tablet, Refills: 1    Associated Diagnoses: Type 2 diabetes mellitus with diabetic polyneuropathy, without long-term current use of insulin (H)      metolazone (ZAROXOLYN) 5 MG tablet TAKE 1 TABLET BY MOUTH DAILY AS NEEDED(WHILE WEIGHT IS ABOVE 190 POUNDS)  Qty: 90 tablet, Refills: 0    Comments: **Patient requests 90 days supply**  Associated Diagnoses: Hypervolemia, unspecified hypervolemia type      mycophenolate (GENERIC EQUIVALENT) 250 MG capsule Take 4 capsules (1,000 mg) by mouth 2 times daily  Qty: 240 capsule, Refills: 11    Associated Diagnoses: Kidney transplanted; Immunosuppression (H)      nystatin (MYCOSTATIN) 483605 UNIT/GM external cream Apply topically 2 times daily To toenails.  Qty: 90 g, Refills: 5    Associated Diagnoses: Onychomycosis      omeprazole (PRILOSEC) 40 MG DR capsule Take 1 capsule (40 mg) by mouth daily  Qty: 90 capsule, Refills: 2    Associated Diagnoses: Gastroesophageal reflux disease without esophagitis      Polyvinyl Alcohol-Povidone (REFRESH OP) Apply to eye as needed Both eyes      !! prazosin (MINIPRESS) 2 MG capsule TAKE 1 CAPSULE ALONG WITH THREE 5MG CAPSULES(15MG) EVERY NIGHT AT BEDTIME FOR A TOTAL DAILY DOSE OF 17MG  Qty: 90 capsule, Refills: 0    Associated Diagnoses: Nightmares associated with chronic post-traumatic stress disorder      !! prazosin (MINIPRESS) 5 MG capsule Take 3 x 5mg (15mg) caps + 1 x 2mg  caps at bedtime (total dose=17mg)  Qty: 270 capsule, Refills: 1    Associated Diagnoses: Nightmares associated with chronic post-traumatic stress disorder      simvastatin (ZOCOR) 20 MG tablet Take 1 tablet (20 mg) by mouth At Bedtime  Qty: 90 tablet, Refills: 3    Associated Diagnoses: Chronic kidney disease, stage V (H)      sulfamethoxazole-trimethoprim (BACTRIM) 400-80 MG tablet Take 1 tablet by mouth daily  Qty: 90 tablet, Refills: 3    Associated Diagnoses: Immunosuppression (H); Kidney transplanted      topiramate (TOPAMAX) 200 MG tablet TAKE 1 TABLET(200 MG) BY MOUTH TWICE DAILY  Qty: 180 tablet, Refills: 1    Associated Diagnoses: Morbid obesity due to excess calories (H)      Vaginal Lubricant (REPLENS) GEL Use vaginally as needed. Can use up to 3 times per week.  Qty: 35 g, Refills: 11    Associated Diagnoses: Vaginal dryness      vilazodone (VIIBRYD) 40 MG TABS tablet Take 1 tablet (40 mg) by mouth daily  Qty: 90 tablet, Refills: 1    Associated Diagnoses: Major depressive disorder, recurrent episode, moderate (H)      albuterol (PROAIR HFA/PROVENTIL HFA/VENTOLIN HFA) 108 (90 Base) MCG/ACT inhaler Inhale 2 puffs into the lungs every 6 hours as needed for shortness of breath / dyspnea or wheezing  Qty: 1 Inhaler, Refills: 5    Comments: Pharmacy may dispense brand covered by insurance (Proair, or proventil or ventolin or generic albuterol inhaler)  Associated Diagnoses: Exercise-induced asthma      blood glucose monitoring (ACCU-CHEK RALPH PLUS) meter device kit Refills: 0      blood glucose monitoring (NO BRAND SPECIFIED) meter device kit Use to test blood sugar 2 times daily or as directed.  Qty: 1 kit, Refills: 0    Associated Diagnoses: Type 2 diabetes mellitus with peripheral neuropathy (H)      blood glucose monitoring (NO BRAND SPECIFIED) test strip Use to test blood sugars 2 times daily or as directed  Qty: 100 strip, Refills: 11    Associated Diagnoses: Type 2 diabetes mellitus with peripheral  neuropathy (H)      blood glucose monitoring (SOFTCLIX) lancets Use to test blood sugar 2 times daily or as directed.  Qty: 1 Box, Refills: 11    Associated Diagnoses: Type 2 diabetes mellitus with peripheral neuropathy (H)      estradiol (VAGIFEM) 10 MCG TABS vaginal tablet Place 1 tablet (10 mcg) vaginally twice a week  Qty: 30 tablet, Refills: 3    Associated Diagnoses: Vaginal atrophy      ondansetron (ZOFRAN-ODT) 4 MG ODT tab Take 1 tablet (4 mg) by mouth every 6 hours as needed for nausea  Qty: 20 tablet, Refills: 2    Associated Diagnoses: Chronic kidney disease, stage V (H)      !! order for DME Equipment being ordered: DME  [id number]   Ankle support, sm, fig 8, lace up  Qty: 1 Device, Refills: 0    Associated Diagnoses: Type II or unspecified type diabetes mellitus with neurological manifestations, not stated as uncontrolled(250.60) (H); Sprain of left ankle, unspecified ligament, subsequent encounter      !! order for DME Walker with front wheels and a seat.  Qty: 1 Units, Refills: 0    Associated Diagnoses: Fall, initial encounter       !! - Potential duplicate medications found. Please discuss with provider.      STOP taking these medications       predniSONE (DELTASONE) 20 MG tablet Comments:   Reason for Stopping:             Allergies   Allergies   Allergen Reactions     Percocet [Oxycodone-Acetaminophen] Nausea and Vomiting     Novocain [Procaine Hcl] Hives     Had reaction 25 years ago to old renetta. Pt reports multiple injections of lidocaine since then without reaction.  Tolerated lidocaine injection today without difficulty.  Osmar Mark MD IR Service.     Data   Most Recent 3 CBC's:  Recent Labs   Lab Test 06/19/21  0748 06/18/21  0957 06/16/21  2145   WBC 4.9 6.8 7.4   HGB 11.4* 13.0 13.7   MCV 89 90 89    176 177      Most Recent 3 BMP's:  Recent Labs   Lab Test 06/19/21  0748 06/18/21  0957 06/16/21  2145    139 136   POTASSIUM 3.6 4.2 3.8   CHLORIDE 111* 111* 105   CO2  19* 20 20   BUN 18 22 19   CR 0.98 1.03 1.24*   ANIONGAP 9 8 11   MARY 8.3* 9.2 9.8   * 161* 203*     Most Recent 2 LFT's:  Recent Labs   Lab Test 06/18/21  0957 06/15/21  1527   AST 21 8   ALT 51* 22   ALKPHOS 94 53   BILITOTAL 1.0 0.8     Most Recent INR's and Anticoagulation Dosing History:  Anticoagulation Dose History     Recent Dosing and Labs Latest Ref Rng & Units 3/20/2014 3/20/2014 3/20/2014 3/20/2014 3/22/2014 3/23/2014 4/16/2014    INR 0.86 - 1.14 1.54(H) 1.73(H) 1.69(H) 1.65(H) 1.68(H) 1.35(H) 1.13    INR Point of Care 0.86 - 1.14 - - - - - - -        Most Recent 3 Troponin's:  Recent Labs   Lab Test 09/18/13  0208 09/17/13 2002   TROPI 0.033 0.034     Most Recent Cholesterol Panel:  Recent Labs   Lab Test 01/02/20  0906   CHOL 180   LDL 88   HDL 61   TRIG 154*     Most Recent 6 Bacteria Isolates From Any Culture (See EPIC Reports for Culture Details):  Recent Labs   Lab Test 06/18/21  1455 06/18/21  1419 06/18/21  0958 10/18/18  1128 09/13/18  1025 05/03/16  1700   CULT Cultured on the 1st day of incubation:  Escherichia coli  Susceptibility testing done on previous specimen  *  Critical Value/Significant Value, preliminary result only, called to and read back by  Francie Stevens R.N. on [facility] at 2:26pm on 06.19.2021 JT.   Cultured on the 1st day of incubation:  Escherichia coli  *  Critical Value/Significant Value, preliminary result only, called to and read back by  Francie Beste R.N. on 06.19.2021 on [facility] at 7:56am JT.    (Note)      POSITIVE for E.COLI by Verigene multiplex nucleic acid test. Final  identification and antimicrobial susceptibility testing will be  verified by standard methods. Verigene test will not distinguish  E.coli from Shigella species including S.dysenteriae, S.flexneri,  S.boydii, and S.sonnei. Specimens containing Shigella species or  E.coli will be reported as Positive for E.coli.    Specimen tested with Taltopiaigene multiplex, gram-negative blood culture  nucleic acid  test for the following targets: Acinetobacter sp.,  Citrobacter sp., Enterobacter sp., Proteus sp., E. coli, K.  pneumoniae/oxytoca, P. aeruginosa, and the following resistance  markers: CTXM, KPC, NDM, VIM, IMP and OXA.    Critical Value/Significant Value called to and read back by Francie Stevens R.N. on 06.19.2021 at 11:07am SH88 JT.         >100,000 colonies/mL  Escherichia coli  *  >100,000 colonies/mL  Strain 2  Escherichia coli  * >100,000 colonies/mL  Escherichia coli  * DEL  Duplicate request    10,000 to 50,000 colonies/mL  mixed urogenital shantal  Susceptibility testing not routinely done   <10,000 colonies/mL Escherichia coli  <10,000 colonies/mL Aerococcus urinae  <1000 colonies/mL mixed urogenital shantal Susceptibility testing not routinely   done  *     Most Recent TSH, T4 and A1c Labs:  Recent Labs   Lab Test 06/19/21  0748 06/15/21  1527 10/30/18  0903 10/30/18  0903   TSH  --  0.68   < > 1.74   T4  --   --   --  0.96   A1C 6.9*  --    < >  --     < > = values in this interval not displayed.       Results for orders placed or performed during the hospital encounter of 06/18/21   Lumbar spine CT w/o contrast    Narrative    CT LUMBAR SPINE WITHOUT CONTRAST  6/18/2021 12:48 PM     HISTORY: Low back pain, increased fracture risk.     TECHNIQUE: Axial images of the lumbar spine were obtained without  intravenous contrast. Multiplanar reformations were performed.   Radiation dose for this scan was reduced using automated exposure  control, adjustment of the mA and/or kV according to patient size, or  iterative reconstruction technique.     COMPARISON: Lumbar spine radiographs 5/16/2012.     FINDINGS:  Nomenclature is based on five lumbar vertebral bodies. No  acute fracture. Normal vertebral body heights. Small chronic-appearing  Schmorl's nodes at the L3-L4 and L5-S1 levels, with tiny Schmorl's  nodes/degenerative endplate irregularities elsewhere. Minimal apparent  levoconvex curvature centered at L4-L5.  Alignment is otherwise normal.    Moderate to severe disc height loss at L5-S1 with vacuum disc  phenomenon and marginal endplate osteophytes. Minimal disc height loss  elsewhere in the lumbar spine. Mild to moderate left and mild right  L5-S1 degenerative facet joint arthropathy. No evidence for high-grade  spinal canal stenosis. There is moderate-appearing left and mild to  moderate-appearing right L5-S1 neural foraminal stenosis and mild  bilateral L4-L5 neural foraminal stenosis. No significant neural  foraminal narrowing elsewhere.    There appear to be at least small bilateral pleural effusions, as seen  on prior CT. Innumerable cysts throughout the visualized aspects of  the kidneys with scattered small/punctate areas of calcification as  well as multiple partially visualized cystic lesions in the liver, in  keeping with the patient's history of polycystic kidney disease.  Low-density ovoid left adrenal mass measuring 2.9 cm in the axial  plane, in keeping with the patient's history of lipid-rich adrenal  adenoma. Scattered atherosclerotic calcifications are noted.      Impression    IMPRESSION:  1. No acute osseous abnormality identified.  2. Multilevel degenerative changes of the lumbar spine, most  pronounced at L5-S1. Please see the body of the report for details.  3. Partially visualized findings in keeping with the patient's history  of polycystic kidney disease.  4. Unchanged low-density ovoid left adrenal mass, compatible with an  adrenal adenoma.    ANGIE BROWN MD   MR Lumbar Spine w/o & w Contrast    Narrative    MRI OF THE LUMBAR SPINE WITHOUT AND WITH CONTRAST  6/19/2021 11:39 AM     HISTORY: Low back pain, infection suspected.    TECHNIQUE: Multiplanar, multisequence MRI images of the lumbar spine  were acquired without and with 10 mL Gadavist IV contrast.    COMPARISON: None.    FINDINGS: There are five lumbar-type vertebrae for the purposes of  this dictation.     Normal vertebral body  heights, alignment and marrow signal. The conus  tip is identified at L1. Polycystic kidneys. Left adrenal presumed  adenoma. No abnormal contrast enhancement.    T12-L1: Normal disc height and signal. No herniation. Normal facets.  No spinal canal or neural foraminal stenosis.    L1-L2: Normal disc height and signal. No herniation. Normal facets. No  spinal canal or neural foraminal stenosis.    L2-L3: Normal disc height and signal. No herniation. Normal facets. No  spinal canal or neural foraminal stenosis.    L3-L4: Normal disc height and signal. No herniation. Normal facets. No  spinal canal or neural foraminal stenosis.    L4-L5: Normal disc height. Mild disc T2 signal loss. Mild posterior  annular bulge. Mild bilateral facet arthropathy. No spinal canal or  neural foraminal stenosis.    L5-S1: Moderate disc height and T2 signal loss. Mild circumferential  disc osteophyte complex. Mild bilateral facet arthropathy. No spinal  canal stenosis. Moderate bilateral neural foraminal stenoses.      Impression    IMPRESSION:  1.  No evidence of infection.  2.  Moderate L5-S1 degenerative change with moderate bilateral neural  foraminal stenoses.  3.  Polycystic kidney disease.  4.  Left adrenal presumed adenoma.  5.  No change.    RUDI MACHADO MD     *Note: Due to a large number of results and/or encounters for the requested time period, some results have not been displayed. A complete set of results can be found in Results Review.

## 2021-06-22 NOTE — CONSULTS
Care Management Initial Consult    General Information  Assessment completed with: Patient,    Type of CM/SW Visit: Initial Assessment    Primary Care Provider verified and updated as needed: Yes   Readmission within the last 30 days: previous discharge plan unsuccessful   Return Category: Medically unsuccessful treatment plan  Reason for Consult: discharge planning  Advance Care Planning:            Communication Assessment  Patient's communication style: spoken language (English or Bilingual)    Hearing Difficulty or Deaf: no   Wear Glasses or Blind: no    Cognitive  Cognitive/Neuro/Behavioral: .WDL except, speech  Level of Consciousness: alert  Arousal Level: opens eyes spontaneously  Orientation: oriented x 4  Mood/Behavior: cooperative  Best Language: 0 - No aphasia  Speech: slow    Living Environment:   People in home: spouse     Current living Arrangements: apartment      Able to return to prior arrangements: yes       Family/Social Support:  Care provided by: self, spouse/significant other  Provides care for: no one  Marital Status:             Description of Support System: Supportive, Involved         Current Resources:   Patient receiving home care services:       Community Resources:    Equipment currently used at home:    Supplies currently used at home:      Employment/Financial:  Employment Status:          Financial Concerns:             Lifestyle & Psychosocial Needs:        Socioeconomic History     Marital status:      Spouse name: Rahat     Number of children: 2     Years of education: Not on file     Highest education level: Not on file   Occupational History     Comment: part-time     Tobacco Use     Smoking status: Never Smoker     Smokeless tobacco: Never Used   Substance and Sexual Activity     Alcohol use: No     Alcohol/week: 0.0 standard drinks     Drug use: No     Sexual activity: Yes     Partners: Male     Birth control/protection: None     Comment: 1 partner        Functional Status:  Prior to admission patient needed assistance:              Mental Health Status:      A & O, pleasant    Chemical Dependency Status:                Values/Beliefs:  Spiritual, Cultural Beliefs, Spiritism Practices, Values that affect care:                 Additional Information:  Writer met with Elizabeth at [facility].  Discussed medicare.gov and home care rating. Reviewed agencies per [place].  Selected 4 agencies; [facility] and [facility] referrals sent and await results.   will pick Elizabeth up at discharge.    ELICIA Gonzales RN

## 2021-06-22 NOTE — DISCHARGE SUMMARY
Discharge Note    Patient discharged to home with services via private vehicle  accompanied by significant other .  IV discontinued: Yes  Prescriptions filled and given to patient/family. Notified of charge of $62 which would be billed to patient- both family and pt aware and okay with med being filled here  Belongings reviewed and sent with family.   Home medications returned to patient: Yes  Equipment sent with: patient, N/A. - old orders for walker and brace (pt has at home already)  patient verbalizes understanding of discharge instructions. AVS given to patient and family.

## 2021-06-22 NOTE — TELEPHONE ENCOUNTER
Call from hospitalist at [facility]. States pt was admitted there with bacteremia. Will likely discharge to home today.   Questioning if CSA and MPA levels are ok? MPA level low at 0.55, pt's current dose it mycophenolate 1,000 mg BID    Recommended pt remain on current dose and repeat labs and level in 1 week. Hospitalist will update pt that she should plan to get labs about 1 week after discharge. Transplant coordinator to touch base with pt and order labs.

## 2021-06-22 NOTE — PLAN OF CARE
Alert, calm and cooperative,  BP elevated, on RA, on mod carb diet tolerating well, transfers wiith light A1 walker, continent, ambulates to the commode, will most likely discharge to home with home help. SW following.

## 2021-06-23 NOTE — TELEPHONE ENCOUNTER
ISSUE:  Recently discharged  Low MPA level     PLAN:  Obtain a full set of transplant labs including a 12 hour mpa level within 1 week    LPN TASK:  Call patient with above instructions  Update lab orders

## 2021-06-24 NOTE — TELEPHONE ENCOUNTER
Call placed to patient. No answer. Voice message left instructing patient to complete labs in one week. Order sent

## 2021-06-28 ENCOUNTER — TRANSFERRED RECORDS (OUTPATIENT)
Dept: HEALTH INFORMATION MANAGEMENT | Facility: CLINIC | Age: 66
End: 2021-06-28

## 2021-06-29 DIAGNOSIS — M54.9 BACK PAIN: Primary | ICD-10-CM

## 2021-06-29 DIAGNOSIS — Z94.0 KIDNEY TRANSPLANTED: ICD-10-CM

## 2021-06-29 DIAGNOSIS — Z48.298 AFTERCARE FOLLOWING ORGAN TRANSPLANT: ICD-10-CM

## 2021-06-29 DIAGNOSIS — F33.1 MAJOR DEPRESSIVE DISORDER, RECURRENT EPISODE, MODERATE (H): ICD-10-CM

## 2021-06-29 DIAGNOSIS — R53.83 FATIGUE, UNSPECIFIED TYPE: ICD-10-CM

## 2021-06-29 LAB
ALBUMIN UR-MCNC: NEGATIVE MG/DL
AMORPH CRY #/AREA URNS HPF: ABNORMAL /HPF
APPEARANCE UR: ABNORMAL
BACTERIA #/AREA URNS HPF: ABNORMAL /HPF
BASOPHILS # BLD AUTO: 0 10E9/L (ref 0–0.2)
BASOPHILS NFR BLD AUTO: 0.4 %
BILIRUB UR QL STRIP: NEGATIVE
COLOR UR AUTO: YELLOW
DIFFERENTIAL METHOD BLD: ABNORMAL
EOSINOPHIL # BLD AUTO: 0.1 10E9/L (ref 0–0.7)
EOSINOPHIL NFR BLD AUTO: 0.8 %
ERYTHROCYTE [DISTWIDTH] IN BLOOD BY AUTOMATED COUNT: 14.9 % (ref 10–15)
GLUCOSE UR STRIP-MCNC: NEGATIVE MG/DL
HCT VFR BLD AUTO: 42 % (ref 35–47)
HGB BLD-MCNC: 13.9 G/DL (ref 11.7–15.7)
HGB UR QL STRIP: NEGATIVE
KETONES UR STRIP-MCNC: NEGATIVE MG/DL
LEUKOCYTE ESTERASE UR QL STRIP: ABNORMAL
LYMPHOCYTES # BLD AUTO: 0.4 10E9/L (ref 0.8–5.3)
LYMPHOCYTES NFR BLD AUTO: 6.2 %
MCH RBC QN AUTO: 29.5 PG (ref 26.5–33)
MCHC RBC AUTO-ENTMCNC: 33.1 G/DL (ref 31.5–36.5)
MCV RBC AUTO: 89 FL (ref 78–100)
MONOCYTES # BLD AUTO: 0.4 10E9/L (ref 0–1.3)
MONOCYTES NFR BLD AUTO: 5 %
MUCOUS THREADS #/AREA URNS LPF: PRESENT /LPF
NEUTROPHILS # BLD AUTO: 6.2 10E9/L (ref 1.6–8.3)
NEUTROPHILS NFR BLD AUTO: 87.6 %
NITRATE UR QL: NEGATIVE
NON-SQ EPI CELLS #/AREA URNS LPF: ABNORMAL /LPF
PH UR STRIP: 7 PH (ref 5–7)
PLATELET # BLD AUTO: 241 10E9/L (ref 150–450)
RBC # BLD AUTO: 4.71 10E12/L (ref 3.8–5.2)
RBC #/AREA URNS AUTO: ABNORMAL /HPF
SOURCE: ABNORMAL
SP GR UR STRIP: 1.02 (ref 1–1.03)
UROBILINOGEN UR STRIP-ACNC: 0.2 EU/DL (ref 0.2–1)
WBC # BLD AUTO: 7.1 10E9/L (ref 4–11)
WBC #/AREA URNS AUTO: ABNORMAL /HPF

## 2021-06-29 PROCEDURE — 85025 COMPLETE CBC W/AUTO DIFF WBC: CPT | Performed by: INTERNAL MEDICINE

## 2021-06-29 PROCEDURE — 81001 URINALYSIS AUTO W/SCOPE: CPT | Performed by: NURSE PRACTITIONER

## 2021-06-29 PROCEDURE — 80180 DRUG SCRN QUAN MYCOPHENOLATE: CPT | Performed by: INTERNAL MEDICINE

## 2021-06-29 PROCEDURE — 36415 COLL VENOUS BLD VENIPUNCTURE: CPT | Performed by: INTERNAL MEDICINE

## 2021-06-29 PROCEDURE — 80158 DRUG ASSAY CYCLOSPORINE: CPT | Performed by: INTERNAL MEDICINE

## 2021-06-29 PROCEDURE — 80048 BASIC METABOLIC PNL TOTAL CA: CPT | Performed by: INTERNAL MEDICINE

## 2021-06-30 ENCOUNTER — VIRTUAL VISIT (OUTPATIENT)
Dept: INTERNAL MEDICINE | Facility: CLINIC | Age: 66
End: 2021-06-30
Payer: MEDICARE

## 2021-06-30 DIAGNOSIS — R78.81 BACTEREMIA: ICD-10-CM

## 2021-06-30 DIAGNOSIS — Z09 HOSPITAL DISCHARGE FOLLOW-UP: Primary | ICD-10-CM

## 2021-06-30 DIAGNOSIS — R31.9 URINARY TRACT INFECTION WITH HEMATURIA, SITE UNSPECIFIED: ICD-10-CM

## 2021-06-30 DIAGNOSIS — N39.0 URINARY TRACT INFECTION WITH HEMATURIA, SITE UNSPECIFIED: ICD-10-CM

## 2021-06-30 DIAGNOSIS — M54.9 INTRACTABLE BACK PAIN: ICD-10-CM

## 2021-06-30 LAB
ANION GAP SERPL CALCULATED.3IONS-SCNC: 6 MMOL/L (ref 3–14)
BUN SERPL-MCNC: 13 MG/DL (ref 7–30)
CALCIUM SERPL-MCNC: 10.8 MG/DL (ref 8.5–10.1)
CHLORIDE SERPL-SCNC: 109 MMOL/L (ref 94–109)
CO2 SERPL-SCNC: 24 MMOL/L (ref 20–32)
CREAT SERPL-MCNC: 1.08 MG/DL (ref 0.52–1.04)
CYCLOSPORINE BLD LC/MS/MS-MCNC: 74 UG/L (ref 50–400)
GFR SERPL CREATININE-BSD FRML MDRD: 54 ML/MIN/{1.73_M2}
GLUCOSE SERPL-MCNC: 162 MG/DL (ref 70–99)
MYCOPHENOLATE SERPL LC/MS/MS-MCNC: <0.25 MG/L (ref 1–3.5)
MYCOPHENOLATE-G SERPL LC/MS/MS-MCNC: 13.1 MG/L (ref 30–95)
POTASSIUM SERPL-SCNC: 4.9 MMOL/L (ref 3.4–5.3)
SODIUM SERPL-SCNC: 139 MMOL/L (ref 133–144)
TME LAST DOSE: ABNORMAL H
TME LAST DOSE: NORMAL H

## 2021-06-30 PROCEDURE — 99214 OFFICE O/P EST MOD 30 MIN: CPT | Mod: 95 | Performed by: INTERNAL MEDICINE

## 2021-06-30 NOTE — PROGRESS NOTES
Elizabeth is a 65 year old who is being evaluated via a billable video visit.      Video Start Time: 8:19am    Assessment & Plan     Hospital Follow:  UTI:  Since being discharged, Elizabeth has continued taking her Ceftin without side effects. She had a repeat UA completed yesterday per transplant coordinator and the infection appears to be resolving. Instructed her to complete course Ceftin.   Bacteremia  Patient's CRP was quite elevated while hospitalized and it has previously been normal (<2.9 on 12/17/2019). Will place order for repeat CRP to determine if the elevation was due to the infection or another underlying problem.  The expectation is that if the infection is clearing that the CRP will be declining.  Objectively it does appear that she is feeling and looking better however laboratory confirmation given her tenuous physical state would be reassuring.  CRP Inflammation   Date Value Ref Range Status   06/18/2021 220.0 (H) 0.0 - 8.0 mg/L Final   - CRP inflammation    Intractable back pain  Elizabeth has had ongoing chronic back pain for some time now. She has numbness and tingling in her feet and hands that seems to be getting worse, however I do not think this is related to her back pain. She states she is extremely weak, especially in the right leg.  She is being evaluated by a spine surgeon tomorrow.     Activities of daily living - because of her multiple chronic conditions and that toll and debility that this has had on her life she is unable to accomplish homemaking skills such as cleaning and laundry.  Additionally her  is in a similar medical condition and he is also unable to assist with these activities.  Therefore I have recommended that they pursue a home health aide who can assist with these activities.    [employer] student  Nurse Practioner Student    I, Horacio Sheridan, was present with the NP  student who participated in the service and in the documentation of the note.  I was  present for the entire virtual visit and we actively did all parts of the visit together.  I agree with the assessment and plan of care as documented in the note.    Horacio Sheridan MD, FACP    Number and complexity of problems:  1 unstable chronic illness or with exacerbation (back pain  1 acute illness with systemic symptoms (bacteremia)    Amount and Complexity of Data Reviewed/Analyzed:  1 External notes reviewed    Risk of complication/morbidity of patient management:  Patient receiving prescription management      Chema Johnson is a 65 year old with a past medical history of PCKD with renal transplant and intractable back pain who presents for the following health issues  accompanied by her spouse:    DB Johnson was admitted to [facility] 6/18/2021-6/22/2021 for a UTI and bacteremia. Prior to this visit, she was evaluated on 6/16/2021 in the ED for extreme general pain. She was given pain medication and sent home. She called 911 on 6/18 due to the continued pain and was subsequently found to have a UTI and bacteremia. She denies having any symptoms of a UTI prior to admission, along with any fevers during the course of this illness. She was switched to Ceftin at discharge and is tolerating it well. She has a few days left. She endorses diarrhea, which is improving and a lack of an appetite.    She has intractable back pain for which she has become extremely weak (right leg worse than left). She uses a walker to mobilize. She received a referral to see a Spine surgeon for a possible injection during hospital admission.     Review of Systems   CONSTITUTIONAL:POSITIVE  for anorexia and weight loss and NEGATIVE  for chills and fever   GI: POSITIVE for diarrhea, poor appetite and weight loss and NEGATIVE for nausea and vomiting  : negative for, dysuria, frequency, hematuria and urgency  MUSCULOSKELETAL: POSITIVE  for back pain      Objective         Vitals:  No vitals were obtained today due  to virtual visit.    Physical Exam   GENERAL: Healthy, alert and no distress  EYES: Eyes grossly normal to inspection.  No discharge or erythema, or obvious scleral/conjunctival abnormalities.  RESP: No audible wheeze, cough, or visible cyanosis.  No visible retractions or increased work of breathing.    SKIN: Visible skin clear. No significant rash, abnormal pigmentation or lesions.  NEURO: Cranial nerves grossly intact.  Mentation and speech appropriate for age.  PSYCH: Mentation appears normal, affect normal/bright, judgement and insight intact, normal speech and appearance well-groomed.        Video-Visit Details    Type of service:  Video Visit    Video End Time:8:43am    Originating Location (pt. Location): Home    Distant Location (provider location):  [facility] INTERNAL MEDICINE [place]     Platform used for Video Visit: SwapDrive

## 2021-07-01 ENCOUNTER — ANCILLARY PROCEDURE (OUTPATIENT)
Dept: GENERAL RADIOLOGY | Facility: CLINIC | Age: 66
End: 2021-07-01
Attending: ORTHOPAEDIC SURGERY
Payer: MEDICARE

## 2021-07-01 ENCOUNTER — OFFICE VISIT (OUTPATIENT)
Dept: ORTHOPEDICS | Facility: CLINIC | Age: 66
End: 2021-07-01
Payer: MEDICARE

## 2021-07-01 ENCOUNTER — TELEPHONE (OUTPATIENT)
Dept: TRANSPLANT | Facility: CLINIC | Age: 66
End: 2021-07-01

## 2021-07-01 DIAGNOSIS — M54.9 BACK PAIN: ICD-10-CM

## 2021-07-01 DIAGNOSIS — M47.27 OSTEOARTHRITIS OF SPINE WITH RADICULOPATHY, LUMBOSACRAL REGION: ICD-10-CM

## 2021-07-01 DIAGNOSIS — Z94.0 KIDNEY TRANSPLANTED: Primary | ICD-10-CM

## 2021-07-01 DIAGNOSIS — M40.37 FLATBACK SYNDROME OF LUMBOSACRAL REGION: Primary | ICD-10-CM

## 2021-07-01 DIAGNOSIS — M40.35 FLATBACK SYNDROME OF THORACOLUMBAR REGION: ICD-10-CM

## 2021-07-01 DIAGNOSIS — R78.81 BACTEREMIA: ICD-10-CM

## 2021-07-01 DIAGNOSIS — M54.17 LUMBOSACRAL RADICULOPATHY: ICD-10-CM

## 2021-07-01 DIAGNOSIS — Z48.298 AFTERCARE FOLLOWING ORGAN TRANSPLANT: ICD-10-CM

## 2021-07-01 LAB — CRP SERPL-MCNC: 3.2 MG/L (ref 0–8)

## 2021-07-01 PROCEDURE — 86140 C-REACTIVE PROTEIN: CPT | Performed by: PATHOLOGY

## 2021-07-01 PROCEDURE — 72082 X-RAY EXAM ENTIRE SPI 2/3 VW: CPT | Performed by: RADIOLOGY

## 2021-07-01 PROCEDURE — 36415 COLL VENOUS BLD VENIPUNCTURE: CPT | Performed by: PATHOLOGY

## 2021-07-01 PROCEDURE — 99204 OFFICE O/P NEW MOD 45 MIN: CPT | Performed by: ORTHOPAEDIC SURGERY

## 2021-07-01 RX ORDER — MYCOPHENOLIC ACID 180 MG/1
720 TABLET, DELAYED RELEASE ORAL 2 TIMES DAILY
Qty: 240 TABLET | Refills: 11 | Status: SHIPPED | OUTPATIENT
Start: 2021-07-01 | End: 2021-07-29

## 2021-07-01 NOTE — TELEPHONE ENCOUNTER
ISSUE  MPA level <0.25, current dose 1,000 mg BID.  CSA level at goal.    OUTCOME  Elizabeth confirms current dose of Mycophenolate of 1,000 mg BID with no missed doses. She reports some mild loose stools d/t current antibiotics, but no other health concerns.  Results forwarded to provider for review.      ADDENDUM:    Francisco Jordan MD Harris, Kathleen, RN             Yes, let's change her to MPA 720mg bid please and recheck a level 1 week after starting. Thanks        OUTCOME  Called Elizabeth with above recommendations. She verbalized understanding to start Myfortic 720 mg BID and stop Mycophenolate. She will have labs drawn with drug levels 1 week after starting.

## 2021-07-01 NOTE — PROGRESS NOTES
BMD Hx:  - Getting Prolia injections every 6 months (has had 3-4 x already as of 7/1/21), first one ~ 2019.  Ordered by Dr. Byers (endocrinologist at [facility]).  Last Prolia injection ~ Jan/Feb 2021.  Next one scheduled on 10/19/21.      In-Person Visit    REASON FOR CONSULTATION: Consult (Low back pain )     REFERRING PHYSICIAN: No ref. provider found  PCP:Horacio Sheridan    History of Present Illness:  65/f, here for f/u after hospital discharge ([facility]), was admitted for 6 days ~ 2 wks ago for severe LBP shooting down her R leg.  This is my first encounter with patient.  During those 6 days, she got antibiotics (for UTI).  MRI taken of her lumbar spine; was told there was nothing broken.      Per patient, she is better compared to when she got admitted.  Doing home PT.  But still having significant back pains.  Per patient, this back pain started only about a week prior to being rushed to the hospital.  Prior to that, she was doing fine in regards to to her back.  No trauma or any specific incident.  Thus, sxs only going on for about a month.    Back 60%, Leg 40%,  Right Only.  Right buttock, down to posterolateral thigh and knee; does not go much below knee.  Worse: standing; changing positions (sit to stand, or stand to sit).  Better: not moving.  Would rather sit than stand.  Uses a walker, but she says this is only recent.  She used to use a walker at night for balance issues, but only started using the walker more regularly with onset of back pain about a month ago.    Previous treatment:   - Ongoing home PT.  - Previous hospitalization x 6 days as above.  - Antibiotics for UTI (has ~ 1 day left).  No spinal injections.  No previous surgeries.  - Tylenol prn.      Oswestry (GORDON) Questionnaire    OSWESTRY DISABILITY INDEX 7/1/2021   Count 9   Sum 30   Oswestry Score (%) 66.67   Some recent data might be hidden          Visual Analog Pain Scale  Back Pain Scale 0-10: 6  Right leg pain: 6  Left leg pain:  0    PROMIS-10 Scores  Global Mental Health Score: (P) 7  Global Physical Health Score: (P) 9  PROMIS TOTAL - SUBSCORES: (P) 16    ROS:  A 12-point review of systems was completed and is negative except for otherwise noted above in the history of present illness.    Med Hx:  Past Medical History:   Diagnosis Date     Abnormal MRI, cervical spine 10/15/2011    2011; mild changes noted. Study done for left arm symptoms Impression:  1. Mild multilevel degenerative disc disease with no significant canal or neural stenosis seen. motion artifact on the STIR images in these are not interpretable. The remaining images were interpreted      Autosomal dominant polycystic kidney disease 2011     (Problem list name updated by automated process. Provider to review and confirm.)     CMC DJD(carpometacarpal degenerative joint disease), localized primary 2013     -donor kidney transplant 2014     Gastroesophageal reflux disease      Generalized anxiety disorder 11/15/2012     Glaucoma      Hyperlipidemia 10/15/2011     Hyperparathyroidism, secondary (H) 2015     Hypertension     resolved     Immunosuppressed status (H) 2014     Major depressive disorder, recurrent episode, moderate (H) 11/15/2012     Obesity (BMI 30-39.9)      OP (osteoporosis) T score -3.8 2009 T-score -3.7      LION (obstructive sleep apnoea) 10/15/2012    reported intolerant to CPAP -- she says she doesn't have LION     Pain in joint, forearm -- L unhealed Fx 2013     Premature menopause age 37 07/10/2012    OCP (vaginal bldg)-->HT which she stopped 2 mo later documented at 2007 visit (age 51).      Restless leg syndrome      Stiffness of joint, not elsewhere classified, hand 2013     Tremor 10/15/2011    head     Type 2 DM with Neuropathy     started with gestational diabetes     Uncomplicated asthma        Surg Hx:  Past Surgical History:   Procedure Laterality Date     ABDOMEN SURGERY        ANKLE SURGERY       C TRANSPLANTATION OF KIDNEY  03/2014     C/SECTION, LOW TRANSVERSE      x 2     CHOLECYSTECTOMY  1990     COLONOSCOPY       ESOPHAGOSCOPY, GASTROSCOPY, DUODENOSCOPY (EGD), COMBINED N/A 05/19/2015    Procedure: COMBINED ESOPHAGOSCOPY, GASTROSCOPY, DUODENOSCOPY (EGD);  Surgeon: Sky Davey MD;  Location:  GI     ESOPHAGOSCOPY, GASTROSCOPY, DUODENOSCOPY (EGD), COMBINED N/A 05/19/2015    Procedure: COMBINED ESOPHAGOSCOPY, GASTROSCOPY, DUODENOSCOPY (EGD), BIOPSY SINGLE OR MULTIPLE;  Surgeon: Sky Davey MD;  Location:  GI     EYE SURGERY       LAPAROSCOPY, SURGICAL; REPAIR INCISIONAL OR VENTRAL HERNIA       LASER SURGERY OF EYE Left 10/01/2020    sever vitreous strands     ORTHOPEDIC SURGERY       HERMINIA EN Y BOWEL  1990     WRIST SURGERY         Allergies:  Allergies   Allergen Reactions     Percocet [Oxycodone-Acetaminophen] Nausea and Vomiting     Novocain [Procaine Hcl] Hives     Had reaction 25 years ago to old renetta. Pt reports multiple injections of lidocaine since then without reaction.  Tolerated lidocaine injection today without difficulty.  Osmar Mark MD IR Service.       Meds:  Current Outpatient Medications   Medication     acetaminophen (TYLENOL) 500 MG tablet     ACETYLCYSTEINE PO     albuterol (PROAIR HFA/PROVENTIL HFA/VENTOLIN HFA) 108 (90 Base) MCG/ACT inhaler     ARIPiprazole (ABILIFY) 2 MG tablet     aspirin EC 81 MG EC tablet     blood glucose monitoring (ACCU-CHEK RALPH PLUS) meter device kit     blood glucose monitoring (NO BRAND SPECIFIED) meter device kit     blood glucose monitoring (NO BRAND SPECIFIED) test strip     blood glucose monitoring (SOFTCLIX) lancets     cefuroxime (CEFTIN) 500 MG tablet     Cholecalciferol (VITAMIN D) 1000 UNITS capsule     cyanocolbalamin (VITAMIN  B-12) 1000 MCG tablet     cycloSPORINE modified (GENERIC EQUIVALENT) 25 MG capsule     estradiol (VAGIFEM) 10 MCG TABS vaginal tablet     ferrous sulfate (FEROSUL) 325  (65 Fe) MG tablet     fluticasone (FLONASE) 50 MCG/ACT nasal spray     furosemide (LASIX) 40 MG tablet     gabapentin (NEURONTIN) 300 MG capsule     latanoprost (XALATAN) 0.005 % ophthalmic solution     metFORMIN (GLUCOPHAGE-XR) 500 MG 24 hr tablet     metolazone (ZAROXOLYN) 5 MG tablet     mycophenolate (GENERIC EQUIVALENT) 250 MG capsule     nystatin (MYCOSTATIN) 712292 UNIT/GM external cream     omeprazole (PRILOSEC) 40 MG DR capsule     ondansetron (ZOFRAN-ODT) 4 MG ODT tab     order for DME     order for DME     Polyvinyl Alcohol-Povidone (REFRESH OP)     prazosin (MINIPRESS) 2 MG capsule     prazosin (MINIPRESS) 5 MG capsule     simvastatin (ZOCOR) 20 MG tablet     sulfamethoxazole-trimethoprim (BACTRIM) 400-80 MG tablet     topiramate (TOPAMAX) 200 MG tablet     Vaginal Lubricant (REPLENS) GEL     vilazodone (VIIBRYD) 40 MG TABS tablet     No current facility-administered medications for this visit.        Fam Hx:  Family History   Problem Relation Age of Onset     Mental Illness Other         family hx     Heart Disease Other      Diabetes Other      Cancer Other      Genetic Disorder Father      Mental Illness Father      Diabetes Father      Hypertension Father      Hyperlipidemia Mother      Diabetes Mother      Hypertension Mother      Mental Illness Other      Diabetes Other      Glaucoma No family hx of      Macular Degeneration No family hx of        P/S Hx:  Social History     Tobacco Use     Smoking status: Never Smoker     Smokeless tobacco: Never Used   Substance Use Topics     Alcohol use: No     Alcohol/week: 0.0 standard drinks         Physical Exam:  Very pleasant, healthy appearing, alert, oriented x 3, cooperative.  Normal mood and affect.  Not in cardiorespiratory distress.  LMP  (LMP Unknown)   Hunched forward posture.  Uses walker with seat for ambulation.  Back: no skin lesions or surgical scars.  Localizes pain at right PSIS region  Tenderness: (-) midline, (-) paraspinal, (+) R PSIS,  (-) L PSIS.    Neuro Exam:  Motor: Grossly intact for bilateral lower extremities.  Sensory:  Intact to light touch in both LE's.         Imaging:    EOS full spine AP lateral standing x-rays taken today show advanced lumbosacral spondylosis, with disc collapse at L5-S1 leading to loss of lordosis.  From an alignment standpoint, he has 2 problems.  One is lumbosacral or lower lumbar flatback deformity.  The other is thoracolumbar junction kyphosis.  No evidence of instability.  No coronal deformity.  Sagittal measurements:  LL 26, ideal 51.5  L4-S1 21, ideal 34  PI 47  PI-LL mismatch 21  PT 37  SVA +8.6cm  TPA 37  GT 48  T10-L2 kyph 29    Labs:  CRP:    6/15/21 190    6/16/21 232    6/18/21 220    ESR:    6/16/21 49      Impression:    1.  Advanced spondylosis L4-5 and L5-S1, with right L5/S1 radiculopathy.  2.  Lower lumbar flatback deformity (L4-S1 =21 degrees, ideal 34).  3.  Thoracolumbar kyphosis (T10-L2 =29 degrees kyphosis).  4.  Urinary tract infection, ongoing antibiotic treatment.  5.  Likely chronic deconditioning.    Plan:    I had a good long discussion with patient and .  Regarding her low back and right leg pain, I suspect this is related mainly to the L5-S1 level.  MRI shows advanced spondylosis with disc protrusion causing spinal stenosis, with foraminal stenosis.  Her leg pain is likely mediated by either the right L5 or right S1 nerve roots.  I reassured her that this is a degenerative condition, not life-threatening, nor would lead to significant paralysis or neurologic deficit.  Thus, surgery is elective, and is performed mainly for quality of life purposes.    At this point, I recommended we continue to maximize nonoperative treatment.  She is doing ongoing home physical therapy.  I also recommend referral to our rehabilitation department for general conditioning (prehab).  I also recommend a right L5-S1 transforaminal HELEN, for both diagnostic and potentially therapeutic purposes.    -  Continue home PT.  - R L5-S1 TFESI (CDI SLP)  - PM&R referral for Prehab    Ultimately, may consider surgery in form of 2 level fusion L4-S1 with bilateral pelvic fixation.  Because of poor bone density, may consider cement augmentation of screws.    Return to clinic as needed.    All questions and concerns were answered to the patient's apparent satisfaction before leaving the clinic.     Total visit time > 45 mins, > 50% counseling and coordination of care.    Respectfully,    Jordon Ventura MD    Orthopaedic Spine Surgery  Dept Orthopaedic Surgery, [facility]  [phone] office, [phone] pager  [email or web address]

## 2021-07-01 NOTE — LETTER
7/1/2021         RE: Elizabeth Palma  [address]  [address]  [facility] [address]        Dear Colleague,    Thank you for referring your patient, Elizabeth Palma, to the [facility] [place]. Please see a copy of my visit note below.    BMD Hx:  - Getting Prolia injections every 6 months (has had 3-4 x already as of 7/1/21), first one ~ 2019.  Ordered by Dr. Byers (endocrinologist at [facility]).  Last Prolia injection ~ Jan/Feb 2021.  Next one scheduled on 10/19/21.      In-Person Visit    REASON FOR CONSULTATION: Consult (Low back pain )     REFERRING PHYSICIAN: No ref. provider found  PCP:Horacio Sheridan    History of Present Illness:  65/f, here for f/u after hospital discharge ([facility]), was admitted for 6 days ~ 2 wks ago for severe LBP shooting down her R leg.  This is my first encounter with patient.  During those 6 days, she got antibiotics (for UTI).  MRI taken of her lumbar spine; was told there was nothing broken.      Per patient, she is better compared to when she got admitted.  Doing home PT.  But still having significant back pains.  Per patient, this back pain started only about a week prior to being rushed to the hospital.  Prior to that, she was doing fine in regards to to her back.  No trauma or any specific incident.  Thus, sxs only going on for about a month.    Back 60%, Leg 40%,  Right Only.  Right buttock, down to posterolateral thigh and knee; does not go much below knee.  Worse: standing; changing positions (sit to stand, or stand to sit).  Better: not moving.  Would rather sit than stand.  Uses a walker, but she says this is only recent.  She used to use a walker at night for balance issues, but only started using the walker more regularly with onset of back pain about a month ago.    Previous treatment:   - Ongoing home PT.  - Previous hospitalization x 6 days as above.  - Antibiotics for UTI (has ~ 1 day left).  No spinal injections.  No previous  surgeries.  - Tylenol prn.      Oswestry (GORDON) Questionnaire    OSWESTRY DISABILITY INDEX 2021   Count 9   Sum 30   Oswestry Score (%) 66.67   Some recent data might be hidden          Visual Analog Pain Scale  Back Pain Scale 0-10: 6  Right leg pain: 6  Left leg pain: 0    PROMIS-10 Scores  Global Mental Health Score: (P) 7  Global Physical Health Score: (P) 9  PROMIS TOTAL - SUBSCORES: (P) 16    ROS:  A 12-point review of systems was completed and is negative except for otherwise noted above in the history of present illness.    Med Hx:  Past Medical History:   Diagnosis Date     Abnormal MRI, cervical spine 10/15/2011    2011; mild changes noted. Study done for left arm symptoms Impression:  1. Mild multilevel degenerative disc disease with no significant canal or neural stenosis seen. motion artifact on the STIR images in these are not interpretable. The remaining images were interpreted      Autosomal dominant polycystic kidney disease 2011     (Problem list name updated by automated process. Provider to review and confirm.)     [facility] DJD(carpometacarpal degenerative joint disease), localized primary 2013     -donor kidney transplant 2014     Gastroesophageal reflux disease      Generalized anxiety disorder 11/15/2012     Glaucoma      Hyperlipidemia 10/15/2011     Hyperparathyroidism, secondary (H) 2015     Hypertension     resolved     Immunosuppressed status (H) 2014     Major depressive disorder, recurrent episode, moderate (H) 11/15/2012     Obesity (BMI 30-39.9)      OP (osteoporosis) T score -3.8 2009 T-score -3.7      LION (obstructive sleep apnoea) 10/15/2012    reported intolerant to CPAP -- she says she doesn't have LION     Pain in joint, forearm -- L unhealed Fx 2013     Premature menopause age 37 07/10/2012    OCP (vaginal bldg)-->HT which she stopped 2 mo later documented at 2007 visit (age 51).      Restless leg syndrome       Stiffness of joint, not elsewhere classified, hand 03/05/2013     Tremor 10/15/2011    head     Type 2 DM with Neuropathy 1985    started with gestational diabetes     Uncomplicated asthma        Surg Hx:  Past Surgical History:   Procedure Laterality Date     ABDOMEN SURGERY       ANKLE SURGERY       C TRANSPLANTATION OF KIDNEY  03/2014     C/SECTION, LOW TRANSVERSE      x 2     CHOLECYSTECTOMY  1990     COLONOSCOPY       ESOPHAGOSCOPY, GASTROSCOPY, DUODENOSCOPY (EGD), COMBINED N/A 05/19/2015    Procedure: COMBINED ESOPHAGOSCOPY, GASTROSCOPY, DUODENOSCOPY (EGD);  Surgeon: Sky Davey MD;  Location:  GI     ESOPHAGOSCOPY, GASTROSCOPY, DUODENOSCOPY (EGD), COMBINED N/A 05/19/2015    Procedure: COMBINED ESOPHAGOSCOPY, GASTROSCOPY, DUODENOSCOPY (EGD), BIOPSY SINGLE OR MULTIPLE;  Surgeon: Sky Davey MD;  Location:  GI     EYE SURGERY       LAPAROSCOPY, SURGICAL; REPAIR INCISIONAL OR VENTRAL HERNIA       LASER SURGERY OF EYE Left 10/01/2020    sever vitreous strands     ORTHOPEDIC SURGERY       HERMINIA EN Y BOWEL  1990     WRIST SURGERY         Allergies:  Allergies   Allergen Reactions     Percocet [Oxycodone-Acetaminophen] Nausea and Vomiting     Novocain [Procaine Hcl] Hives     Had reaction 25 years ago to old renetta. Pt reports multiple injections of lidocaine since then without reaction.  Tolerated lidocaine injection today without difficulty.  Osmar Mark MD IR Service.       Meds:  Current Outpatient Medications   Medication     acetaminophen (TYLENOL) 500 MG tablet     ACETYLCYSTEINE PO     albuterol (PROAIR HFA/PROVENTIL HFA/VENTOLIN HFA) 108 (90 Base) MCG/ACT inhaler     ARIPiprazole (ABILIFY) 2 MG tablet     aspirin EC 81 MG EC tablet     blood glucose monitoring (ACCU-CHEK RALPH PLUS) meter device kit     blood glucose monitoring (NO BRAND SPECIFIED) meter device kit     blood glucose monitoring (NO BRAND SPECIFIED) test strip     blood glucose monitoring (SOFTCLIX) lancets      cefuroxime (CEFTIN) 500 MG tablet     Cholecalciferol (VITAMIN D) 1000 UNITS capsule     cyanocolbalamin (VITAMIN  B-12) 1000 MCG tablet     cycloSPORINE modified (GENERIC EQUIVALENT) 25 MG capsule     estradiol (VAGIFEM) 10 MCG TABS vaginal tablet     ferrous sulfate (FEROSUL) 325 (65 Fe) MG tablet     fluticasone (FLONASE) 50 MCG/ACT nasal spray     furosemide (LASIX) 40 MG tablet     gabapentin (NEURONTIN) 300 MG capsule     latanoprost (XALATAN) 0.005 % ophthalmic solution     metFORMIN (GLUCOPHAGE-XR) 500 MG 24 hr tablet     metolazone (ZAROXOLYN) 5 MG tablet     mycophenolate (GENERIC EQUIVALENT) 250 MG capsule     nystatin (MYCOSTATIN) 299385 UNIT/GM external cream     omeprazole (PRILOSEC) 40 MG DR capsule     ondansetron (ZOFRAN-ODT) 4 MG ODT tab     order for DME     order for DME     Polyvinyl Alcohol-Povidone (REFRESH OP)     prazosin (MINIPRESS) 2 MG capsule     prazosin (MINIPRESS) 5 MG capsule     simvastatin (ZOCOR) 20 MG tablet     sulfamethoxazole-trimethoprim (BACTRIM) 400-80 MG tablet     topiramate (TOPAMAX) 200 MG tablet     Vaginal Lubricant (REPLENS) GEL     vilazodone (VIIBRYD) 40 MG TABS tablet     No current facility-administered medications for this visit.        Fam Hx:  Family History   Problem Relation Age of Onset     Mental Illness Other         family hx     Heart Disease Other      Diabetes Other      Cancer Other      Genetic Disorder Father      Mental Illness Father      Diabetes Father      Hypertension Father      Hyperlipidemia Mother      Diabetes Mother      Hypertension Mother      Mental Illness Other      Diabetes Other      Glaucoma No family hx of      Macular Degeneration No family hx of        P/S Hx:  Social History     Tobacco Use     Smoking status: Never Smoker     Smokeless tobacco: Never Used   Substance Use Topics     Alcohol use: No     Alcohol/week: 0.0 standard drinks         Physical Exam:  Very pleasant, healthy appearing, alert, oriented x 3,  cooperative.  Normal mood and affect.  Not in cardiorespiratory distress.  LMP  (LMP Unknown)   Hunched forward posture.  Uses walker with seat for ambulation.  Back: no skin lesions or surgical scars.  Localizes pain at right PSIS region  Tenderness: (-) midline, (-) paraspinal, (+) R PSIS, (-) L PSIS.    Neuro Exam:  Motor: Grossly intact for bilateral lower extremities.  Sensory:  Intact to light touch in both LE's.         Imaging:    EOS full spine AP lateral standing x-rays taken today show advanced lumbosacral spondylosis, with disc collapse at L5-S1 leading to loss of lordosis.  From an alignment standpoint, he has 2 problems.  One is lumbosacral or lower lumbar flatback deformity.  The other is thoracolumbar junction kyphosis.  No evidence of instability.  No coronal deformity.  Sagittal measurements:  LL 26, ideal 51.5  L4-S1 21, ideal 34  PI 47  PI-LL mismatch 21  PT 37  SVA +8.6cm  TPA 37  GT 48  T10-L2 kyph 29    Labs:  CRP:    6/15/21 190    6/16/21 232    6/18/21 220    ESR:    6/16/21 49      Impression:    1.  Advanced spondylosis L4-5 and L5-S1, with right L5/S1 radiculopathy.  2.  Lower lumbar flatback deformity (L4-S1 =21 degrees, ideal 34).  3.  Thoracolumbar kyphosis (T10-L2 =29 degrees kyphosis).  4.  Urinary tract infection, ongoing antibiotic treatment.  5.  Likely chronic deconditioning.    Plan:    I had a good long discussion with patient and .  Regarding her low back and right leg pain, I suspect this is related mainly to the L5-S1 level.  MRI shows advanced spondylosis with disc protrusion causing spinal stenosis, with foraminal stenosis.  Her leg pain is likely mediated by either the right L5 or right S1 nerve roots.  I reassured her that this is a degenerative condition, not life-threatening, nor would lead to significant paralysis or neurologic deficit.  Thus, surgery is elective, and is performed mainly for quality of life purposes.    At this point, I recommended we continue  to maximize nonoperative treatment.  She is doing ongoing home physical therapy.  I also recommend referral to our rehabilitation department for general conditioning (prehab).  I also recommend a right L5-S1 transforaminal HELEN, for both diagnostic and potentially therapeutic purposes.    - Continue home PT.  - R L5-S1 TFESI (CDI SLP)  - PM&R referral for Prehab    Ultimately, may consider surgery in form of 2 level fusion L4-S1 with bilateral pelvic fixation.  Because of poor bone density, may consider cement augmentation of screws.    Return to clinic as needed.    All questions and concerns were answered to the patient's apparent satisfaction before leaving the clinic.     Total visit time > 45 mins, > 50% counseling and coordination of care.    Respectfully,    Jordon Ventura MD    Orthopaedic Spine Surgery  Dept Orthopaedic Surgery, [facility]  [phone] office, [phone] pager  [email or web address]

## 2021-07-02 ENCOUNTER — TELEPHONE (OUTPATIENT)
Dept: TRANSPLANT | Facility: CLINIC | Age: 66
End: 2021-07-02

## 2021-07-02 ENCOUNTER — TELEPHONE (OUTPATIENT)
Dept: ORTHOPEDICS | Facility: CLINIC | Age: 66
End: 2021-07-02

## 2021-07-02 RX ORDER — VILAZODONE HYDROCHLORIDE 40 MG/1
TABLET ORAL
Qty: 30 TABLET | Refills: 0 | Status: SHIPPED | OUTPATIENT
Start: 2021-07-02 | End: 2021-07-08

## 2021-07-02 NOTE — TELEPHONE ENCOUNTER
vilazodone (VIIBRYD) 40 MG  Last refilled: 12/3/20  Qty: 90 : 1    Last seen: 3/4/21  RTC: 4 weeks  Cancel: 0  No-show: 0  Next appt: 7/8/21  Refill decision: Refilled for 30 days per protocol.

## 2021-07-02 NOTE — TELEPHONE ENCOUNTER
See phone message from [facility].  I called them back & told them per dictation  ordered Right L5-S1 TFESI.  Faxed dictation to [facility].  They will call pt to schedule.  Call back prn. They agreed.  TRICIA./Rosanna Lewis RN.

## 2021-07-02 NOTE — TELEPHONE ENCOUNTER
[facility] Call Center    Phone Message    May a detailed message be left on voicemail: yes     Reason for Call: Order(s): Other:   Reason for requested: injection order needs clarification  Date needed: today  Provider name: Dr. Audrey Ventura ordered a transforaminal injection but didn't indicate which level.    Please call Nayana (or anyone who answers) to give info over the phone.    Action Taken: Message routed to:  [facility] ([facility] ortho    Travel Screening: Not Applicable

## 2021-07-05 DIAGNOSIS — F33.1 MAJOR DEPRESSIVE DISORDER, RECURRENT EPISODE, MODERATE (H): ICD-10-CM

## 2021-07-05 DIAGNOSIS — Z53.9 DIAGNOSIS NOT YET DEFINED: Primary | ICD-10-CM

## 2021-07-05 PROCEDURE — G0180 MD CERTIFICATION HHA PATIENT: HCPCS | Performed by: INTERNAL MEDICINE

## 2021-07-07 RX ORDER — ARIPIPRAZOLE 2 MG/1
TABLET ORAL
Qty: 135 TABLET | Refills: 1 | OUTPATIENT
Start: 2021-07-07

## 2021-07-08 ENCOUNTER — VIRTUAL VISIT (OUTPATIENT)
Dept: PSYCHIATRY | Facility: CLINIC | Age: 66
End: 2021-07-08
Payer: MEDICARE

## 2021-07-08 DIAGNOSIS — F43.12 NIGHTMARES ASSOCIATED WITH CHRONIC POST-TRAUMATIC STRESS DISORDER: ICD-10-CM

## 2021-07-08 DIAGNOSIS — F33.1 MAJOR DEPRESSIVE DISORDER, RECURRENT EPISODE, MODERATE (H): ICD-10-CM

## 2021-07-08 DIAGNOSIS — F51.5 NIGHTMARES ASSOCIATED WITH CHRONIC POST-TRAUMATIC STRESS DISORDER: ICD-10-CM

## 2021-07-08 RX ORDER — PRAZOSIN HYDROCHLORIDE 5 MG/1
CAPSULE ORAL
Qty: 270 CAPSULE | Refills: 1 | Status: SHIPPED | OUTPATIENT
Start: 2021-07-08 | End: 2021-09-23

## 2021-07-08 RX ORDER — ARIPIPRAZOLE 2 MG/1
3 TABLET ORAL 2 TIMES DAILY
Qty: 135 TABLET | Refills: 1 | Status: SHIPPED | OUTPATIENT
Start: 2021-07-08 | End: 2021-09-23

## 2021-07-08 RX ORDER — PRAZOSIN HYDROCHLORIDE 2 MG/1
CAPSULE ORAL
Qty: 90 CAPSULE | Refills: 1 | Status: SHIPPED | OUTPATIENT
Start: 2021-07-08 | End: 2021-09-09

## 2021-07-08 RX ORDER — VILAZODONE HYDROCHLORIDE 40 MG/1
40 TABLET ORAL DAILY
Qty: 90 TABLET | Refills: 1 | Status: SHIPPED | OUTPATIENT
Start: 2021-07-08 | End: 2021-09-23

## 2021-07-08 ASSESSMENT — PATIENT HEALTH QUESTIONNAIRE - PHQ9: SUM OF ALL RESPONSES TO PHQ QUESTIONS 1-9: 7
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
## 2021-07-20 ENCOUNTER — TRANSFERRED RECORDS (OUTPATIENT)
Dept: HEALTH INFORMATION MANAGEMENT | Facility: CLINIC | Age: 66
End: 2021-07-20

## 2021-07-22 ENCOUNTER — TELEPHONE (OUTPATIENT)
Dept: TRANSPLANT | Facility: CLINIC | Age: 66
End: 2021-07-22

## 2021-07-22 NOTE — TELEPHONE ENCOUNTER
ISSUE  Due for MPA level after switching Mycophenolate to Myfortic.    PLAN  Call Elizabeth:  Confirm she has switched to  mg BID.  Advise she have an MPA level drawn as long as she has been on Myfortic for at least 1 week.    OUTCOME  Elizabeth reports starting Myfort 720 mg BID over 1 week ago. She reports feeling well. She will make a lab appointment for next week.

## 2021-07-23 DIAGNOSIS — E11.42 TYPE 2 DIABETES MELLITUS WITH DIABETIC POLYNEUROPATHY, WITHOUT LONG-TERM CURRENT USE OF INSULIN (H): ICD-10-CM

## 2021-07-26 RX ORDER — METFORMIN HCL 500 MG
1500 TABLET, EXTENDED RELEASE 24 HR ORAL
Qty: 270 TABLET | Refills: 1 | Status: SHIPPED | OUTPATIENT
Start: 2021-07-26 | End: 2021-11-01

## 2021-07-26 NOTE — TELEPHONE ENCOUNTER
Last Clinic Visit: 6/30/2021  [facility] [place]

## 2021-07-27 ENCOUNTER — LAB (OUTPATIENT)
Dept: LAB | Facility: CLINIC | Age: 66
End: 2021-07-27
Payer: MEDICARE

## 2021-07-27 DIAGNOSIS — Z94.0 KIDNEY TRANSPLANTED: ICD-10-CM

## 2021-07-27 DIAGNOSIS — Z48.298 AFTERCARE FOLLOWING ORGAN TRANSPLANT: ICD-10-CM

## 2021-07-27 LAB
ANION GAP SERPL CALCULATED.3IONS-SCNC: 7 MMOL/L (ref 3–14)
BUN SERPL-MCNC: 12 MG/DL (ref 7–30)
CALCIUM SERPL-MCNC: 9.5 MG/DL (ref 8.5–10.1)
CHLORIDE BLD-SCNC: 111 MMOL/L (ref 94–109)
CO2 SERPL-SCNC: 21 MMOL/L (ref 20–32)
CREAT SERPL-MCNC: 1.03 MG/DL (ref 0.52–1.04)
CYCLOSPORINE BLD LC/MS/MS-MCNC: 105 UG/L (ref 50–400)
ERYTHROCYTE [DISTWIDTH] IN BLOOD BY AUTOMATED COUNT: 15 % (ref 10–15)
GFR SERPL CREATININE-BSD FRML MDRD: 58 ML/MIN/1.73M2
GLUCOSE BLD-MCNC: 230 MG/DL (ref 70–99)
HCT VFR BLD AUTO: 41.4 % (ref 35–47)
HGB BLD-MCNC: 13.1 G/DL (ref 11.7–15.7)
MCH RBC QN AUTO: 29 PG (ref 26.5–33)
MCHC RBC AUTO-ENTMCNC: 31.6 G/DL (ref 31.5–36.5)
MCV RBC AUTO: 92 FL (ref 78–100)
PLATELET # BLD AUTO: 159 10E3/UL (ref 150–450)
POTASSIUM BLD-SCNC: 4 MMOL/L (ref 3.4–5.3)
RBC # BLD AUTO: 4.51 10E6/UL (ref 3.8–5.2)
SODIUM SERPL-SCNC: 139 MMOL/L (ref 133–144)
TME LAST DOSE: NORMAL H
TME LAST DOSE: NORMAL H
WBC # BLD AUTO: 4.4 10E3/UL (ref 4–11)

## 2021-07-27 PROCEDURE — 85027 COMPLETE CBC AUTOMATED: CPT

## 2021-07-27 PROCEDURE — 80158 DRUG ASSAY CYCLOSPORINE: CPT

## 2021-07-27 PROCEDURE — 80048 BASIC METABOLIC PNL TOTAL CA: CPT

## 2021-07-27 PROCEDURE — 80180 DRUG SCRN QUAN MYCOPHENOLATE: CPT

## 2021-07-27 PROCEDURE — 36415 COLL VENOUS BLD VENIPUNCTURE: CPT

## 2021-07-28 LAB
MYCOPHENOLATE SERPL LC/MS/MS-MCNC: 0.61 MG/L (ref 1–3.5)
MYCOPHENOLATE-G SERPL LC/MS/MS-MCNC: 88.7 MG/L (ref 30–95)
TME LAST DOSE: ABNORMAL H
TME LAST DOSE: ABNORMAL H

## 2021-07-29 ENCOUNTER — TELEPHONE (OUTPATIENT)
Dept: TRANSPLANT | Facility: CLINIC | Age: 66
End: 2021-07-29

## 2021-07-29 DIAGNOSIS — Z48.298 AFTERCARE FOLLOWING ORGAN TRANSPLANT: ICD-10-CM

## 2021-07-29 DIAGNOSIS — Z94.0 KIDNEY TRANSPLANTED: Primary | ICD-10-CM

## 2021-07-29 RX ORDER — MYCOPHENOLIC ACID 180 MG/1
900 TABLET, DELAYED RELEASE ORAL 2 TIMES DAILY
Qty: 300 TABLET | Refills: 11 | Status: SHIPPED | OUTPATIENT
Start: 2021-07-29 | End: 2021-09-23

## 2021-07-29 NOTE — TELEPHONE ENCOUNTER
ISSUE:  MPA 0.6    Francisco Jordan MD Harris, Kathleen, RN  Please increase MPA to 900mg bid and recheck level     LPN task: please call with recommendations and place lab orders to repeat in 2 weeks.

## 2021-07-29 NOTE — TELEPHONE ENCOUNTER
Spoke to patient who confirms the following:  MPA 0.6  Patient verbalizes understanding to increase MPA dose to 900 mg BID and repeat txp labs in 2 weeks.

## 2021-08-09 ENCOUNTER — LAB (OUTPATIENT)
Dept: LAB | Facility: CLINIC | Age: 66
End: 2021-08-09
Payer: MEDICARE

## 2021-08-09 ENCOUNTER — MEDICAL CORRESPONDENCE (OUTPATIENT)
Dept: HEALTH INFORMATION MANAGEMENT | Facility: CLINIC | Age: 66
End: 2021-08-09

## 2021-08-09 DIAGNOSIS — Z94.0 KIDNEY TRANSPLANTED: ICD-10-CM

## 2021-08-09 LAB
ANION GAP SERPL CALCULATED.3IONS-SCNC: 6 MMOL/L (ref 3–14)
BUN SERPL-MCNC: 18 MG/DL (ref 7–30)
CALCIUM SERPL-MCNC: 10.1 MG/DL (ref 8.5–10.1)
CHLORIDE BLD-SCNC: 107 MMOL/L (ref 94–109)
CO2 SERPL-SCNC: 26 MMOL/L (ref 20–32)
CREAT SERPL-MCNC: 1.1 MG/DL (ref 0.52–1.04)
CYCLOSPORINE BLD LC/MS/MS-MCNC: 94 UG/L (ref 50–400)
ERYTHROCYTE [DISTWIDTH] IN BLOOD BY AUTOMATED COUNT: 14.3 % (ref 10–15)
GFR SERPL CREATININE-BSD FRML MDRD: 54 ML/MIN/1.73M2
GLUCOSE BLD-MCNC: 185 MG/DL (ref 70–99)
HCT VFR BLD AUTO: 44.2 % (ref 35–47)
HGB BLD-MCNC: 14 G/DL (ref 11.7–15.7)
MCH RBC QN AUTO: 29.1 PG (ref 26.5–33)
MCHC RBC AUTO-ENTMCNC: 31.7 G/DL (ref 31.5–36.5)
MCV RBC AUTO: 92 FL (ref 78–100)
PLATELET # BLD AUTO: 186 10E3/UL (ref 150–450)
POTASSIUM BLD-SCNC: 4.7 MMOL/L (ref 3.4–5.3)
RBC # BLD AUTO: 4.81 10E6/UL (ref 3.8–5.2)
SODIUM SERPL-SCNC: 139 MMOL/L (ref 133–144)
TME LAST DOSE: NORMAL H
TME LAST DOSE: NORMAL H
WBC # BLD AUTO: 4.9 10E3/UL (ref 4–11)

## 2021-08-09 PROCEDURE — 82374 ASSAY BLOOD CARBON DIOXIDE: CPT

## 2021-08-09 PROCEDURE — 85014 HEMATOCRIT: CPT

## 2021-08-09 PROCEDURE — 80158 DRUG ASSAY CYCLOSPORINE: CPT

## 2021-08-09 PROCEDURE — 36415 COLL VENOUS BLD VENIPUNCTURE: CPT

## 2021-08-09 PROCEDURE — 80180 DRUG SCRN QUAN MYCOPHENOLATE: CPT

## 2021-08-10 ENCOUNTER — OFFICE VISIT (OUTPATIENT)
Dept: PODIATRY | Facility: CLINIC | Age: 66
End: 2021-08-10
Payer: MEDICARE

## 2021-08-10 VITALS — DIASTOLIC BLOOD PRESSURE: 70 MMHG | HEART RATE: 61 BPM | SYSTOLIC BLOOD PRESSURE: 102 MMHG | OXYGEN SATURATION: 100 %

## 2021-08-10 DIAGNOSIS — M21.969 TYPE 2 DIABETES MELLITUS WITH DIABETIC FOOT DEFORMITY (H): ICD-10-CM

## 2021-08-10 DIAGNOSIS — E11.69 TYPE 2 DIABETES MELLITUS WITH DIABETIC FOOT DEFORMITY (H): ICD-10-CM

## 2021-08-10 DIAGNOSIS — E11.49 TYPE II OR UNSPECIFIED TYPE DIABETES MELLITUS WITH NEUROLOGICAL MANIFESTATIONS, NOT STATED AS UNCONTROLLED(250.60) (H): Primary | ICD-10-CM

## 2021-08-10 DIAGNOSIS — L60.2 ONYCHAUXIS: ICD-10-CM

## 2021-08-10 LAB
MYCOPHENOLATE SERPL LC/MS/MS-MCNC: 4.57 MG/L (ref 1–3.5)
MYCOPHENOLATE-G SERPL LC/MS/MS-MCNC: 111 MG/L (ref 30–95)
TME LAST DOSE: ABNORMAL H
TME LAST DOSE: ABNORMAL H

## 2021-08-10 PROCEDURE — 99214 OFFICE O/P EST MOD 30 MIN: CPT | Mod: 25 | Performed by: PODIATRIST

## 2021-08-10 PROCEDURE — 11719 TRIM NAIL(S) ANY NUMBER: CPT | Performed by: PODIATRIST

## 2021-08-10 NOTE — PROGRESS NOTES
Past Medical History:   Diagnosis Date     Abnormal MRI, cervical spine 10/15/2011    2011; mild changes noted. Study done for left arm symptoms Impression:  1. Mild multilevel degenerative disc disease with no significant canal or neural stenosis seen. motion artifact on the STIR images in these are not interpretable. The remaining images were interpreted      Autosomal dominant polycystic kidney disease 2011     (Problem list name updated by automated process. Provider to review and confirm.)     CMC DJD(carpometacarpal degenerative joint disease), localized primary 2013     -donor kidney transplant 2014     Gastroesophageal reflux disease      Generalized anxiety disorder 11/15/2012     Glaucoma      Hyperlipidemia 10/15/2011     Hyperparathyroidism, secondary (H) 2015     Hypertension     resolved     Immunosuppressed status (H) 2014     Major depressive disorder, recurrent episode, moderate (H) 11/15/2012     Obesity (BMI 30-39.9)      OP (osteoporosis) T score -3.8 2009 T-score -3.7      LION (obstructive sleep apnoea) 10/15/2012    reported intolerant to CPAP -- she says she doesn't have LION     Pain in joint, forearm -- L unhealed Fx 2013     Premature menopause age 37 07/10/2012    OCP (vaginal bldg)-->HT which she stopped 2 mo later documented at 2007 visit (age 51).      Restless leg syndrome      Stiffness of joint, not elsewhere classified, hand 2013     Tremor 10/15/2011    head     Type 2 DM with Neuropathy 1985    started with gestational diabetes     Uncomplicated asthma      Patient Active Problem List   Diagnosis     Hypertension     Hyperlipidemia     Abnormal MRI, cervical spine     Sensory loss     Premature menopause age 37     OP (osteoporosis) T score -3.8     Major depressive disorder, recurrent episode, moderate (H)     Generalized anxiety disorder     CMC DJD(carpometacarpal degenerative joint disease), localized  primary     Pain in joint, forearm -- L unhealed Fx     -donor kidney transplant     Immunosuppressed status (H)     Hyperparathyroidism, secondary (H)     Hyperparathyroidism (H)     Senile osteoporosis     Pain in joint involving ankle and foot     Nightmares associated with chronic post-traumatic stress disorder     DM type 2 with Neuropathy, Hgb A1C 7.3 on 21     Posttraumatic stress disorder     Plantar fasciitis, bilateral     Right knee pain     Bilateral knee pain     Age-related osteoporosis without current pathological fracture     Narcissistic personality disorder (H)     Personal history of other drug therapy     Median sensory neuropathy, left     Marital conflict     Suicidal ideation     Autosomal dominant polycystic kidney disease     Secondary hyperparathyroidism (H)     Anemia, iron deficiency     Morbid obesity (H)     Chronic kidney disease, stage 3     PCKD, S/P Renal Transplant  ([facility])     Intractable back pain     UTI     Bacteremia     Past Surgical History:   Procedure Laterality Date     ABDOMEN SURGERY       ANKLE SURGERY       C TRANSPLANTATION OF KIDNEY  2014     C/SECTION, LOW TRANSVERSE      x 2     CHOLECYSTECTOMY       COLONOSCOPY       ESOPHAGOSCOPY, GASTROSCOPY, DUODENOSCOPY (EGD), COMBINED N/A 2015    Procedure: COMBINED ESOPHAGOSCOPY, GASTROSCOPY, DUODENOSCOPY (EGD);  Surgeon: Sky Davey MD;  Location:  GI     ESOPHAGOSCOPY, GASTROSCOPY, DUODENOSCOPY (EGD), COMBINED N/A 2015    Procedure: COMBINED ESOPHAGOSCOPY, GASTROSCOPY, DUODENOSCOPY (EGD), BIOPSY SINGLE OR MULTIPLE;  Surgeon: Sky Davey MD;  Location:  GI     EYE SURGERY       LAPAROSCOPY, SURGICAL; REPAIR INCISIONAL OR VENTRAL HERNIA       LASER SURGERY OF EYE Left 10/01/2020    sever vitreous strands     ORTHOPEDIC SURGERY       HERMINIA EN Y BOWEL       WRIST SURGERY       Social History     Socioeconomic History     Marital status:      Spouse name:  Rahat     Number of children: 2     Years of education: Not on file     Highest education level: Not on file   Occupational History     Comment: part-time   Tobacco Use     Smoking status: Never Smoker     Smokeless tobacco: Never Used   Substance and Sexual Activity     Alcohol use: No     Alcohol/week: 0.0 standard drinks     Drug use: No     Sexual activity: Yes     Partners: Male     Birth control/protection: None     Comment: 1 partner   Other Topics Concern     Parent/sibling w/ CABG, MI or angioplasty before 65F 55M? 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)      Social Determinants of Health     Financial Resource Strain:      Difficulty of Paying Living Expenses:    Food Insecurity:      Worried About Running Out of Food in the Last Year:      Ran Out of Food in the Last Year:    Transportation Needs:      Lack of Transportation (Medical):      Lack of Transportation (Non-Medical):    Physical Activity:      Days of Exercise per Week:      Minutes of Exercise per Session:    Stress:      Feeling of Stress :    Social Connections:      Frequency of Communication with Friends and Family:      Frequency of Social Gatherings with Friends and Family:      Attends [employer] Services:      Active Member of Clubs or Organizations:      Attends Club or Organization Meetings:      Marital Status:    Intimate Partner Violence:      Fear of Current or Ex-Partner:      Emotionally Abused:      Physically Abused:      Sexually Abused:      Family History   Problem Relation Age of Onset     Mental Illness Other         family hx     Heart Disease Other      Diabetes Other      Cancer Other      Genetic Disorder Father      Mental Illness Father      Diabetes Father      Hypertension Father      Hyperlipidemia Mother      Diabetes Mother      Hypertension Mother      Mental Illness Other      Diabetes Other      Glaucoma No family hx of      Macular  Degeneration No family hx of      Lab Results   Component Value Date    A1C 6.9 06/19/2021    A1C 7.3 01/25/2021    A1C 6.7 09/03/2020    A1C 7.1 12/17/2019    A1C 7.2 04/24/2019     Lab Results   Component Value Date    WBC 4.9 08/09/2021    WBC 7.1 06/29/2021     Lab Results   Component Value Date    RBC 4.81 08/09/2021    RBC 4.71 06/29/2021     Lab Results   Component Value Date    HGB 14.0 08/09/2021    HGB 13.9 06/29/2021     Lab Results   Component Value Date    HCT 44.2 08/09/2021    HCT 42.0 06/29/2021     No components found for: MCT  Lab Results   Component Value Date    MCV 92 08/09/2021    MCV 89 06/29/2021     Lab Results   Component Value Date    MCH 29.1 08/09/2021    MCH 29.5 06/29/2021     Lab Results   Component Value Date    MCHC 31.7 08/09/2021    MCHC 33.1 06/29/2021     Lab Results   Component Value Date    RDW 14.3 08/09/2021    RDW 14.9 06/29/2021     Lab Results   Component Value Date     08/09/2021     06/29/2021     Last Comprehensive Metabolic Panel:  Sodium   Date Value Ref Range Status   08/09/2021 139 133 - 144 mmol/L Final   06/29/2021 139 133 - 144 mmol/L Final     Potassium   Date Value Ref Range Status   08/09/2021 4.7 3.4 - 5.3 mmol/L Final   06/29/2021 4.9 3.4 - 5.3 mmol/L Final     Chloride   Date Value Ref Range Status   08/09/2021 107 94 - 109 mmol/L Final   06/29/2021 109 94 - 109 mmol/L Final     Carbon Dioxide   Date Value Ref Range Status   06/29/2021 24 20 - 32 mmol/L Final     Carbon Dioxide (CO2)   Date Value Ref Range Status   08/09/2021 26 20 - 32 mmol/L Final     Anion Gap   Date Value Ref Range Status   08/09/2021 6 3 - 14 mmol/L Final   06/29/2021 6 3 - 14 mmol/L Final     Glucose   Date Value Ref Range Status   08/09/2021 185 (H) 70 - 99 mg/dL Final   06/29/2021 162 (H) 70 - 99 mg/dL Final     Urea Nitrogen   Date Value Ref Range Status   08/09/2021 18 7 - 30 mg/dL Final   06/29/2021 13 7 - 30 mg/dL Final     Creatinine   Date Value Ref Range Status    08/09/2021 1.10 (H) 0.52 - 1.04 mg/dL Final   06/29/2021 1.08 (H) 0.52 - 1.04 mg/dL Final     GFR Estimate   Date Value Ref Range Status   08/09/2021 54 (L) >60 mL/min/1.73m2 Final     Comment:     As of July 11, 2021, eGFR is calculated by the CKD-EPI creatinine equation, without race adjustment. eGFR can be influenced by muscle mass, exercise, and diet. The reported eGFR is an estimation only and is only applicable if the renal function is stable.   06/29/2021 54 (L) >60 mL/min/[1.73_m2] Final     Comment:     Non  GFR Calc  Starting 12/18/2018, serum creatinine based estimated GFR (eGFR) will be   calculated using the Chronic Kidney Disease Epidemiology Collaboration   (CKD-EPI) equation.       Calcium   Date Value Ref Range Status   08/09/2021 10.1 8.5 - 10.1 mg/dL Final   06/29/2021 10.8 (H) 8.5 - 10.1 mg/dL Final     SUBJECTIVE FINDINGS:  This 65-year-old female returns to clinic for diabetic foot check and onychauxis, onychocryptosis. She relates she needs new Diabetic shoes. Relates no ulcers or sores since we have seen her last, no injuries. She relates she is wearing diabetic shoes. She relates she does have some numbness and tingling in her feet.       OBJECTIVE FINDINGS:  DP and PT are 2/4 bilaterally. She has decreased ankle joint dorsiflexion bilaterally. She has dorsally contracted digits bilaterally 1-5. She has flexible flat foot with dorsal medial first MPJ prominence. There is no erythema, no drainage, no odor, no calor bilaterally. She has incurvated nails 1-5 bilaterally to differing degrees.  She has some thickening, dystrophy and subungual debris of toenails bilaterally.      ASSESSMENT AND PLAN: Onychauxis and Onychomycosis bilaterally causing pain. She is diabetic with peripheral neuropathy and has Hammertoes present. Diagnosis and treatment discussed with her. All the nails were reduced or debrided bilaterally upon consent. Prescription for Diabetic shoes and custom  orthotics given.  Previous notes reviewed.  Return to clinic to see me in about 2-3 months.   Moderate level of medical decision making with Number and Complexity of  Problems Addressed- moderate and Risk of Complications and/or  Morbidity or Mortality of  Patient Management- moderate.

## 2021-08-10 NOTE — LETTER
8/10/2021         RE: Elizabeth Palma  [address]  [address]  [facility] [address]        Dear Colleague,    Thank you for referring your patient, Elizabeth Palma, to the [facility] [place]. Please see a copy of my visit note below.    Past Medical History:   Diagnosis Date     Abnormal MRI, cervical spine 10/15/2011    2011; mild changes noted. Study done for left arm symptoms Impression:  1. Mild multilevel degenerative disc disease with no significant canal or neural stenosis seen. motion artifact on the STIR images in these are not interpretable. The remaining images were interpreted      Autosomal dominant polycystic kidney disease 2011     (Problem list name updated by automated process. Provider to review and confirm.)     CMC DJD(carpometacarpal degenerative joint disease), localized primary 2013     -donor kidney transplant 2014     Gastroesophageal reflux disease      Generalized anxiety disorder 11/15/2012     Glaucoma      Hyperlipidemia 10/15/2011     Hyperparathyroidism, secondary (H) 2015     Hypertension     resolved     Immunosuppressed status (H) 2014     Major depressive disorder, recurrent episode, moderate (H) 11/15/2012     Obesity (BMI 30-39.9)      OP (osteoporosis) T score -3.8 2009 T-score -3.7      LION (obstructive sleep apnoea) 10/15/2012    reported intolerant to CPAP -- she says she doesn't have LION     Pain in joint, forearm -- L unhealed Fx 2013     Premature menopause age 37 07/10/2012    OCP (vaginal bldg)-->HT which she stopped 2 mo later documented at 2007 visit (age 51).      Restless leg syndrome      Stiffness of joint, not elsewhere classified, hand 2013     Tremor 10/15/2011    head     Type 2 DM with Neuropathy 1985    started with gestational diabetes     Uncomplicated asthma      Patient Active Problem List   Diagnosis     Hypertension     Hyperlipidemia     Abnormal  MRI, cervical spine     Sensory loss     Premature menopause age 37     OP (osteoporosis) T score -3.8     Major depressive disorder, recurrent episode, moderate (H)     Generalized anxiety disorder     CMC DJD(carpometacarpal degenerative joint disease), localized primary     Pain in joint, forearm -- L unhealed Fx     -donor kidney transplant     Immunosuppressed status (H)     Hyperparathyroidism, secondary (H)     Hyperparathyroidism (H)     Senile osteoporosis     Pain in joint involving ankle and foot     Nightmares associated with chronic post-traumatic stress disorder     DM type 2 with Neuropathy, Hgb A1C 7.3 on 21     Posttraumatic stress disorder     Plantar fasciitis, bilateral     Right knee pain     Bilateral knee pain     Age-related osteoporosis without current pathological fracture     Narcissistic personality disorder (H)     Personal history of other drug therapy     Median sensory neuropathy, left     Marital conflict     Suicidal ideation     Autosomal dominant polycystic kidney disease     Secondary hyperparathyroidism (H)     Anemia, iron deficiency     Morbid obesity (H)     Chronic kidney disease, stage 3     PCKD, S/P Renal Transplant  ([facility])     Intractable back pain     UTI     Bacteremia     Past Surgical History:   Procedure Laterality Date     ABDOMEN SURGERY       ANKLE SURGERY       C TRANSPLANTATION OF KIDNEY  2014     C/SECTION, LOW TRANSVERSE      x 2     CHOLECYSTECTOMY       COLONOSCOPY       ESOPHAGOSCOPY, GASTROSCOPY, DUODENOSCOPY (EGD), COMBINED N/A 2015    Procedure: COMBINED ESOPHAGOSCOPY, GASTROSCOPY, DUODENOSCOPY (EGD);  Surgeon: Sky Davey MD;  Location:  GI     ESOPHAGOSCOPY, GASTROSCOPY, DUODENOSCOPY (EGD), COMBINED N/A 2015    Procedure: COMBINED ESOPHAGOSCOPY, GASTROSCOPY, DUODENOSCOPY (EGD), BIOPSY SINGLE OR MULTIPLE;  Surgeon: Sky Davey MD;  Location:  GI     EYE SURGERY       LAPAROSCOPY, SURGICAL;  REPAIR INCISIONAL OR VENTRAL HERNIA       LASER SURGERY OF EYE Left 10/01/2020    sever vitreous strands     ORTHOPEDIC SURGERY       HERMINIA EN Y BOWEL  1990     WRIST SURGERY       Social History     Socioeconomic History     Marital status:      Spouse name: Rahat     Number of children: 2     Years of education: Not on file     Highest education level: Not on file   Occupational History     Comment: part-time   Tobacco Use     Smoking status: Never Smoker     Smokeless tobacco: Never Used   Substance and Sexual Activity     Alcohol use: No     Alcohol/week: 0.0 standard drinks     Drug use: No     Sexual activity: Yes     Partners: Male     Birth control/protection: None     Comment: 1 partner   Other Topics Concern     Parent/sibling w/ CABG, MI or angioplasty before 65F 55M? 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)      Social Determinants of Health     Financial Resource Strain:      Difficulty of Paying Living Expenses:    Food Insecurity:      Worried About Running Out of Food in the Last Year:      Ran Out of Food in the Last Year:    Transportation Needs:      Lack of Transportation (Medical):      Lack of Transportation (Non-Medical):    Physical Activity:      Days of Exercise per Week:      Minutes of Exercise per Session:    Stress:      Feeling of Stress :    Social Connections:      Frequency of Communication with Friends and Family:      Frequency of Social Gatherings with Friends and Family:      Attends Jehovah's witness Services:      Active Member of Clubs or Organizations:      Attends Club or Organization Meetings:      Marital Status:    Intimate Partner Violence:      Fear of Current or Ex-Partner:      Emotionally Abused:      Physically Abused:      Sexually Abused:      Family History   Problem Relation Age of Onset     Mental Illness Other         family hx     Heart Disease Other      Diabetes Other      Cancer Other       Genetic Disorder Father      Mental Illness Father      Diabetes Father      Hypertension Father      Hyperlipidemia Mother      Diabetes Mother      Hypertension Mother      Mental Illness Other      Diabetes Other      Glaucoma No family hx of      Macular Degeneration No family hx of      Lab Results   Component Value Date    A1C 6.9 06/19/2021    A1C 7.3 01/25/2021    A1C 6.7 09/03/2020    A1C 7.1 12/17/2019    A1C 7.2 04/24/2019     Lab Results   Component Value Date    WBC 4.9 08/09/2021    WBC 7.1 06/29/2021     Lab Results   Component Value Date    RBC 4.81 08/09/2021    RBC 4.71 06/29/2021     Lab Results   Component Value Date    HGB 14.0 08/09/2021    HGB 13.9 06/29/2021     Lab Results   Component Value Date    HCT 44.2 08/09/2021    HCT 42.0 06/29/2021     No components found for: MCT  Lab Results   Component Value Date    MCV 92 08/09/2021    MCV 89 06/29/2021     Lab Results   Component Value Date    MCH 29.1 08/09/2021    MCH 29.5 06/29/2021     Lab Results   Component Value Date    MCHC 31.7 08/09/2021    MCHC 33.1 06/29/2021     Lab Results   Component Value Date    RDW 14.3 08/09/2021    RDW 14.9 06/29/2021     Lab Results   Component Value Date     08/09/2021     06/29/2021     Last Comprehensive Metabolic Panel:  Sodium   Date Value Ref Range Status   08/09/2021 139 133 - 144 mmol/L Final   06/29/2021 139 133 - 144 mmol/L Final     Potassium   Date Value Ref Range Status   08/09/2021 4.7 3.4 - 5.3 mmol/L Final   06/29/2021 4.9 3.4 - 5.3 mmol/L Final     Chloride   Date Value Ref Range Status   08/09/2021 107 94 - 109 mmol/L Final   06/29/2021 109 94 - 109 mmol/L Final     Carbon Dioxide   Date Value Ref Range Status   06/29/2021 24 20 - 32 mmol/L Final     Carbon Dioxide (CO2)   Date Value Ref Range Status   08/09/2021 26 20 - 32 mmol/L Final     Anion Gap   Date Value Ref Range Status   08/09/2021 6 3 - 14 mmol/L Final   06/29/2021 6 3 - 14 mmol/L Final     Glucose   Date Value  Ref Range Status   08/09/2021 185 (H) 70 - 99 mg/dL Final   06/29/2021 162 (H) 70 - 99 mg/dL Final     Urea Nitrogen   Date Value Ref Range Status   08/09/2021 18 7 - 30 mg/dL Final   06/29/2021 13 7 - 30 mg/dL Final     Creatinine   Date Value Ref Range Status   08/09/2021 1.10 (H) 0.52 - 1.04 mg/dL Final   06/29/2021 1.08 (H) 0.52 - 1.04 mg/dL Final     GFR Estimate   Date Value Ref Range Status   08/09/2021 54 (L) >60 mL/min/1.73m2 Final     Comment:     As of July 11, 2021, eGFR is calculated by the CKD-EPI creatinine equation, without race adjustment. eGFR can be influenced by muscle mass, exercise, and diet. The reported eGFR is an estimation only and is only applicable if the renal function is stable.   06/29/2021 54 (L) >60 mL/min/[1.73_m2] Final     Comment:     Non  GFR Calc  Starting 12/18/2018, serum creatinine based estimated GFR (eGFR) will be   calculated using the Chronic Kidney Disease Epidemiology Collaboration   (CKD-EPI) equation.       Calcium   Date Value Ref Range Status   08/09/2021 10.1 8.5 - 10.1 mg/dL Final   06/29/2021 10.8 (H) 8.5 - 10.1 mg/dL Final     SUBJECTIVE FINDINGS:  This 65-year-old female returns to clinic for diabetic foot check and onychauxis, onychocryptosis. She relates she needs new Diabetic shoes. Relates no ulcers or sores since we have seen her last, no injuries. She relates she is wearing diabetic shoes. She relates she does have some numbness and tingling in her feet.       OBJECTIVE FINDINGS:  DP and PT are 2/4 bilaterally. She has decreased ankle joint dorsiflexion bilaterally. She has dorsally contracted digits bilaterally 1-5. She has flexible flat foot with dorsal medial first MPJ prominence. There is no erythema, no drainage, no odor, no calor bilaterally. She has incurvated nails 1-5 bilaterally to differing degrees.  She has some thickening, dystrophy and subungual debris of toenails bilaterally.      ASSESSMENT AND PLAN: Onychauxis and  Onychomycosis bilaterally causing pain. She is diabetic with peripheral neuropathy and has Hammertoes present. Diagnosis and treatment discussed with her. All the nails were reduced or debrided bilaterally upon consent. Prescription for Diabetic shoes and custom orthotics given.  Previous notes reviewed.  Return to clinic to see me in about 2-3 months.   Moderate level of medical decision making with Number and Complexity of  Problems Addressed- moderate and Risk of Complications and/or  Morbidity or Mortality of  Patient Management- moderate.      Again, thank you for allowing me to participate in the care of your patient.        Sincerely,        Javier Tuttle DPM

## 2021-08-10 NOTE — PATIENT INSTRUCTIONS
Thanks for coming today.  [facility]  [address] [place] [address]    To schedule future appointments in Ortho Clinic, you may call [phone].    To schedule ordered imaging by your provider:   Call Central Imaging [phone]    To schedule an injection ordered by your provider:  Call Central Imaging Injection scheduling line: [phone]  [facility] available online at:  [email or web address]    Please call if any further questions or concerns ([phone]).  Clinic hours 8 am to 5 pm.    Return to clinic (call) if symptoms worsen or fail to improve.

## 2021-08-10 NOTE — NURSING NOTE
Elizabeth Palma's chief complaint for this visit includes:  Chief Complaint   Patient presents with     RECHECK     diabetic foot care     PCP: Horacio Sheridan    Referring Provider:  No referring provider defined for this encounter.    /70   Pulse 61   LMP  (LMP Unknown)   SpO2 100%   Data Unavailable     Do you need any medication refills at today's visit? No    Elma Wiseman CMA

## 2021-08-11 DIAGNOSIS — Z48.298 AFTERCARE FOLLOWING ORGAN TRANSPLANT: ICD-10-CM

## 2021-08-11 DIAGNOSIS — Z94.0 KIDNEY TRANSPLANTED: Primary | ICD-10-CM

## 2021-08-11 DIAGNOSIS — Z94.0 KIDNEY REPLACED BY TRANSPLANT: ICD-10-CM

## 2021-08-11 NOTE — PROGRESS NOTES
ISSUE  MPA level 4.57. Dose recently increased to 900 mg BID d/t low level on 720 mg BID.    PLAN  Francisco Jordan MD Harris, Kathleen, RN  Keep current dose and recheck thanks       OUTCOME  Lab orders placed.

## 2021-08-28 DIAGNOSIS — F33.1 MAJOR DEPRESSIVE DISORDER, RECURRENT EPISODE, MODERATE (H): ICD-10-CM

## 2021-09-01 RX ORDER — ARIPIPRAZOLE 2 MG/1
TABLET ORAL
Qty: 135 TABLET | Refills: 1 | OUTPATIENT
Start: 2021-09-01

## 2021-09-02 DIAGNOSIS — F51.5 NIGHTMARES ASSOCIATED WITH CHRONIC POST-TRAUMATIC STRESS DISORDER: Primary | ICD-10-CM

## 2021-09-02 DIAGNOSIS — F43.12 NIGHTMARES ASSOCIATED WITH CHRONIC POST-TRAUMATIC STRESS DISORDER: Primary | ICD-10-CM

## 2021-09-07 NOTE — TELEPHONE ENCOUNTER
Last seen: 07/08/2021  RTC: 1 month  Cancel: 09/16/2021  No-show: none  Next appt: 09/23/2021    Incoming refill from refill team via epic message for clarification    Medication requested: Prazosin 5 MG   Directions: take 3 capsules (15 mg) by mouth daily with one 2 MG capsule to equal 17 MG once daily at bedtime  Qty: 30  Last refilled: 07/08/2021    Medication requested: Prazosin 2 MG   Directions: take 1 tablet (2 mg) with 3 tablets of 5 MG (15 mg) to equal 17 MG once daily at bedtime   Qty: 30  Last refilled: 07/08/2021    Medication routed to provider for clarification.

## 2021-09-07 NOTE — TELEPHONE ENCOUNTER
Left message with confirmation that the test was ordered and gave Dr Tony Aburto recommendations on using the testing facility at the [facility] location  Spoke with Elizabeth sanchez: lab results, particularly with elevated CRP, and E-consult for Rheumatology for concern for possible polymyalgia rheumatica. She continues to feel unwell with significant fatigue and is not eating. At this point I do recommend that she be seen in the ED for further evaluation given the duration of her symptoms. She agrees with the plan.  GERHARD Epstein CNP

## 2021-09-09 RX ORDER — PRAZOSIN HYDROCHLORIDE 2 MG/1
CAPSULE ORAL
Qty: 90 CAPSULE | Refills: 1 | Status: SHIPPED | OUTPATIENT
Start: 2021-09-09 | End: 2021-09-23

## 2021-09-09 RX ORDER — PRAZOSIN HYDROCHLORIDE 2 MG/1
2 CAPSULE ORAL AT BEDTIME
Qty: 30 CAPSULE | Refills: 2 | Status: SHIPPED | OUTPATIENT
Start: 2021-09-09 | End: 2022-03-03

## 2021-09-10 NOTE — PROGRESS NOTES
"Elizabeth Palma is a 66 year old female who is being evaluated via a billable telephone visit.  Pt could not connect to video, converted visit to phone.     The patient has been notified of following:     \"This telephone visit will be conducted via a call between you and your physician/provider. We have found that certain health care needs can be provided without the need for a physical exam.  This service lets us provide the care you need with a short phone conversation.  If a prescription is necessary we can send it directly to your pharmacy.  If lab work is needed we can place an order for that and you can then stop by our lab to have the test done at a later time.    Telephone visits are billed at different rates depending on your insurance coverage. During this emergency period, for some insurers they may be billed the same as an in-person visit.  Please reach out to your insurance provider with any questions.    If during the course of the call the physician/provider feels a telephone visit is not appropriate, you will not be charged for this service.\"    Patient has given verbal consent for Telephone visit?  Yes    How would you like to obtain your AVS? Edwardohart    Phone call duration: 15 minutes    During this virtual visit the patient is located in [place], patient verifies this as the location during the entirety of this visit.         Nutrition Reassessment  Reason For Visit:  Elizabeth Palma is a 64 year-old female presenting today for nutrition follow-up, s/p \"gastric bypass in 1990 at [facility]\" per Pt report. She is seeing medical weight management.  She was referred by Dr. Irene.  Note: Pt had a kidney transplant on March 20, 2014.    Patient with Co-morbidities of obesity including:  Type II DM yes  Renal Failure no  Sleep apnea yes  Hypertension no   Dyslipidemia yes  Joint pain no  Back pain no  GERD yes     Anthropometrics:  Height: 61\"  Pre-op Weight: 265 lbs per Pt report  Home weight: 200 lb ( per " pt's report)     Current: 195 lbs (was down 190 lbs in April)     Current Vitamins/Minerals: 3000 International Units vitamin D/day, Calcium citrate chewy 500 mg with vitamin 500 mg 1x daily, vitamin C, vitamin B12 every other day per PCP, iron  - pt is no longer taking a multivitamin and reports she was told to only take calcium 1x daily.      Nutrition History:  Per previous RD visits:  - Lactose intolerance ever since bariatric surgery.  - Pt stated that she has osteoporosis; however, she stated that her doctors don't want her to take any vitamins or minerals due to her kidney transplant.  - Pt states she does not like the taste of protein drinks (shakes and clear liquids), yogurt, beans/lentils or any of the high protein products we have in clinic. Pt reports that she does not like meat, and does not eat a lot of it.   - Pt and  follow a Kosher diet   - Pt also stated on previous visits that she will not record food intake due to the fact that every time she does this, she simply does not eat as she doesn't want to record.  She stated that her therapist strongly advises against recording food intake for this reason.   - Cannot eat bone broth because it is not kosher.      4/13/2021  - Reports she has been gaining weight. Her brother recently passed away   Brother past away people bring food, lots of pasta with cheese dishes.   - Pt reports she did a food record to Dr. Irene and he said it looked good, unable to find record in Chart.   - Continues to have nausea in the morning, reports she is not hungry in the morning or during the day and does not eat, then reports she will have a salad in the morning, which sits well in her stomach.     Today - pt states her physical activity has significantly been reduced d/t back pain. She has not changed her intake accordibly and weight has gone up a few lbs. She continues to focus on getting in more protein (meat alternatives) at meals.     Diet Recall:  B: 2 pc  gafilta fish; watermelon  Lunch: bagel with veggie cream cheese  D: Ola's fish kelechi no bread   Snacks: 2 apples   Beverages: hot tea, water (3-4 x 16 oz)    Progress Towards Previous Goals:  1. Eat 3 meals with lean protein at each meal, along with a non-starchy vegetable or whole fruit, up to 1/2 c carb at a meal. - Improving   Breakfast: Salad with hard boiled egg    Lunch: Cottage cheese + fruit or vegetable   Dinner: fish, tuna, salmon, tofu, vegan crumbles+ fruit or vegetable  2. If needing a snack, have vegetables or protein snack. Limit 3x/day.  - Improving, has been using apples or cucumber slices for snacks.   3. Continue physical therapy and ask for exercises to do at home.  - Trying to walk a lot more (she reports per PT recommendations), went to an apple orchard this past week.   4. Have a list of high protein food on the refrigerator to help remind you to grab a protein while eating. - Improving  5. Drink 48-64 ounces of water/fluids a day. - Met, continue    Nutrition Prescription:   Grams Protein: 60 (minimum)   Amount of Fluid: 48-64 oz    Nutrition Diagnosis  Current: Overweight related to history of excessive energy intake, variable eating habits/intake  as evidenced by pt report and BMI > 25. - Continues    Intervention  1. Reviewed and modified goals   2. Reviewed high protein foods, and encouraged a higher protein food with breakfast, lunch and dinner. After protein, encouraged pt to focus on inclusion of fruits/vegetables servings at meals, with further reduction of starches.   3. Praised pt on use of fruit/veggies as snack options.  5. AVS via "[employer]t     Patient Understanding: good  Expected Compliance: good    Goals:   1. Eat 3 meals with lean protein at each meal, along with a non-starchy vegetable or whole fruit, up to 1/2 c carb at a meal.    Breakfast: Salad with hard boiled egg    Lunch: Cottage cheese + fruit or vegetable   Dinner: fish, tuna, salmon, tofu, vegan crumbles+ fruit or  vegetable  2. If needing a snack, have vegetables or protein snack. Limit 3x/day.     - Apples, sliced cucumbers    - Prep/slice fruits and veggies  3. Continue physical therapy and ask for exercises to do at home.  4. Have a list of high protein food on the refrigerator to help remind you to grab a protein while eating.   - Eat protein foods first at your meal  5. Drink 48-64 ounces of water/fluids a day.      Protein Sources for Weight Loss  [email or web address]       Follow-Up: 3 months     Time spent with patient: 15 minutes  Mireille Aguirre RD, LD

## 2021-09-13 ENCOUNTER — MEDICAL CORRESPONDENCE (OUTPATIENT)
Dept: HEALTH INFORMATION MANAGEMENT | Facility: CLINIC | Age: 66
End: 2021-09-13

## 2021-09-13 ENCOUNTER — VIRTUAL VISIT (OUTPATIENT)
Dept: ENDOCRINOLOGY | Facility: CLINIC | Age: 66
End: 2021-09-13
Payer: MEDICARE

## 2021-09-13 DIAGNOSIS — Z98.84 STATUS POST BARIATRIC SURGERY: ICD-10-CM

## 2021-09-13 DIAGNOSIS — E11.42 TYPE 2 DIABETES MELLITUS WITH PERIPHERAL NEUROPATHY (H): ICD-10-CM

## 2021-09-13 DIAGNOSIS — E66.9 OBESITY: ICD-10-CM

## 2021-09-13 DIAGNOSIS — Z71.3 NUTRITIONAL COUNSELING: Primary | ICD-10-CM

## 2021-09-13 PROCEDURE — 97803 MED NUTRITION INDIV SUBSEQ: CPT | Mod: 95 | Performed by: DIETITIAN, REGISTERED

## 2021-09-13 NOTE — LETTER
"9/13/2021       RE: Elizabeth Palma  [address]  [address]  [facility] [address]     Dear Colleague,    Thank you for referring your patient, Elizabeth Palma, to the [facility] [place] [facility]. Please see a copy of my visit note below.    Elizabeth Palma is a 66 year old female who is being evaluated via a billable telephone visit.  Pt could not connect to video, converted visit to phone.     The patient has been notified of following:     \"This telephone visit will be conducted via a call between you and your physician/provider. We have found that certain health care needs can be provided without the need for a physical exam.  This service lets us provide the care you need with a short phone conversation.  If a prescription is necessary we can send it directly to your pharmacy.  If lab work is needed we can place an order for that and you can then stop by our lab to have the test done at a later time.    Telephone visits are billed at different rates depending on your insurance coverage. During this emergency period, for some insurers they may be billed the same as an in-person visit.  Please reach out to your insurance provider with any questions.    If during the course of the call the physician/provider feels a telephone visit is not appropriate, you will not be charged for this service.\"    Patient has given verbal consent for Telephone visit?  Yes    How would you like to obtain your AVS? F?rsat Bu F?rsathart    Phone call duration: 15 minutes    During this virtual visit the patient is located in [place], patient verifies this as the location during the entirety of this visit.         Nutrition Reassessment  Reason For Visit:  Elizabeth Palma is a 64 year-old female presenting today for nutrition follow-up, s/p \"gastric bypass in 1990 at [facility]\" per Pt report. She is seeing medical weight management.  She was referred by  " "Maria Victoria.  Note: Pt had a kidney transplant on March 20, 2014.    Patient with Co-morbidities of obesity including:  Type II DM yes  Renal Failure no  Sleep apnea yes  Hypertension no   Dyslipidemia yes  Joint pain no  Back pain no  GERD yes     Anthropometrics:  Height: 61\"  Pre-op Weight: 265 lbs per Pt report  Home weight: 200 lb ( per pt's report)     Current: 195 lbs (was down 190 lbs in April)     Current Vitamins/Minerals: 3000 International Units vitamin D/day, Calcium citrate chewy 500 mg with vitamin 500 mg 1x daily, vitamin C, vitamin B12 every other day per PCP, iron  - pt is no longer taking a multivitamin and reports she was told to only take calcium 1x daily.      Nutrition History:  Per previous RD visits:  - Lactose intolerance ever since bariatric surgery.  - Pt stated that she has osteoporosis; however, she stated that her doctors don't want her to take any vitamins or minerals due to her kidney transplant.  - Pt states she does not like the taste of protein drinks (shakes and clear liquids), yogurt, beans/lentils or any of the high protein products we have in clinic. Pt reports that she does not like meat, and does not eat a lot of it.   - Pt and  follow a Kosher diet   - Pt also stated on previous visits that she will not record food intake due to the fact that every time she does this, she simply does not eat as she doesn't want to record.  She stated that her therapist strongly advises against recording food intake for this reason.   - Cannot eat bone broth because it is not kosher.      4/13/2021  - Reports she has been gaining weight. Her brother recently passed away   Brother past away people bring food, lots of pasta with cheese dishes.   - Pt reports she did a food record to Dr. Irene and he said it looked good, unable to find record in Chart.   - Continues to have nausea in the morning, reports she is not hungry in the morning or during the day and does not eat, then " reports she will have a salad in the morning, which sits well in her stomach.     Today - pt states her physical activity has significantly been reduced d/t back pain. She has not changed her intake accordibly and weight has gone up a few lbs. She continues to focus on getting in more protein (meat alternatives) at meals.     Diet Recall:  B: 2 pc gafilta fish; watermelon  Lunch: bagel with veggie cream cheese  D: Azul's fish kelechi no bread   Snacks: 2 apples   Beverages: hot tea, water (3-4 x 16 oz)    Progress Towards Previous Goals:  1. Eat 3 meals with lean protein at each meal, along with a non-starchy vegetable or whole fruit, up to 1/2 c carb at a meal. - Improving   Breakfast: Salad with hard boiled egg    Lunch: Cottage cheese + fruit or vegetable   Dinner: fish, tuna, salmon, tofu, vegan crumbles+ fruit or vegetable  2. If needing a snack, have vegetables or protein snack. Limit 3x/day.  - Improving, has been using apples or cucumber slices for snacks.   3. Continue physical therapy and ask for exercises to do at home.  - Trying to walk a lot more (she reports per PT recommendations), went to an apple orchard this past week.   4. Have a list of high protein food on the refrigerator to help remind you to grab a protein while eating. - Improving  5. Drink 48-64 ounces of water/fluids a day. - Met, continue    Nutrition Prescription:   Grams Protein: 60 (minimum)   Amount of Fluid: 48-64 oz    Nutrition Diagnosis  Current: Overweight related to history of excessive energy intake, variable eating habits/intake  as evidenced by pt report and BMI > 25. - Continues    Intervention  1. Reviewed and modified goals   2. Reviewed high protein foods, and encouraged a higher protein food with breakfast, lunch and dinner. After protein, encouraged pt to focus on inclusion of fruits/vegetables servings at meals, with further reduction of starches.   3. Praised pt on use of fruit/veggies as snack options.  5. AVS via  [employer]     Patient Understanding: good  Expected Compliance: good    Goals:   1. Eat 3 meals with lean protein at each meal, along with a non-starchy vegetable or whole fruit, up to 1/2 c carb at a meal.    Breakfast: Salad with hard boiled egg    Lunch: Cottage cheese + fruit or vegetable   Dinner: fish, tuna, salmon, tofu, vegan crumbles+ fruit or vegetable  2. If needing a snack, have vegetables or protein snack. Limit 3x/day.     - Apples, sliced cucumbers    - Prep/slice fruits and veggies  3. Continue physical therapy and ask for exercises to do at home.  4. Have a list of high protein food on the refrigerator to help remind you to grab a protein while eating.   - Eat protein foods first at your meal  5. Drink 48-64 ounces of water/fluids a day.      Protein Sources for Weight Loss  [email or web address]       Follow-Up: 3 months     Time spent with patient: 15 minutes  Mireille Aguirre RD, LD

## 2021-09-13 NOTE — PATIENT INSTRUCTIONS
Mainor Johnson,    Follow-up with RD in 3 months.     Thank you,    Mireille Aguirre, RD, LD  If you would like to schedule or reschedule an appointment with the RD, please call [phone]    Nutrition Goals  1. Eat 3 meals with lean protein at each meal, along with a non-starchy vegetable or whole fruit, up to 1/2 c carb at a meal.    Breakfast: Salad with hard boiled egg    Lunch: Cottage cheese + fruit or vegetable   Dinner: fish, tuna, salmon, tofu, vegan crumbles+ fruit or vegetable  2. If needing a snack, have vegetables or protein snack. Limit 3x/day.     - Apples, sliced cucumbers    - Prep/slice fruits and veggies  3. Continue physical therapy and ask for exercises to do at home.  4. Have a list of high protein food on the refrigerator to help remind you to grab a protein while eating.   - Eat protein foods first at your meal  5. Drink 48-64 ounces of water/fluids a day.      Protein Sources for Weight Loss  [email or web address]     Interested in working with a health ?  Health coaches work with you to improve your overall health and wellbeing.  They look at the whole person, and may involve discussion of different areas of life, including, but not limited to the four pillars of health (sleep, exercise, nutrition, and stress management). Discuss with your care team if you would like to start working a health .    Health Coaching-3 Pack:    $99 for three health coaching visits    Visits may be done in person or via phone    Coaching is a partnership between the  and the client; Coaches do not prescribe or diagnose    Coaching helps inspire the client to reach his/her personal goals      COMPREHENSIVE WEIGHT MANAGEMENT PROGRAM  VIRTUAL SUPPORT GROUPS    For Support Group Information:      We offer support groups for patients who are working on weight loss and considering, preparing for or have had weight loss surgery.   There is no cost for this opportunity.  You are invited to attend  "the?Virtual Support Groups?provided by any of the following locations:    1. [facility] via SnapMD Teams with Cordelia Vásquez RN  2.   [place] via SnapMD Teams with Stiven Gonzalez, PhD, LP  3.   [place] via SnapMD Teams with Jes Curry RN  4.   [facility] via SnapMD Teams with Jes Meadows [facility]    The following Support Group information can also be found on our website:  [email or web address]      [facility] Weight Loss Surgery Support Group    [facility] Weight Loss Surgery Support Group  The support group is a patient-lead forum that meets monthly to share experiences, encouragement and education. It is open to those who have had weight loss surgery, are scheduled for surgery, and those who are considering surgery.   WHEN: This group meets on the 3rd Wednesday of each month from 5:00PM - 6:00PM virtually using Microsoft Teams.   FACILITATOR: Led by Cordelia Vásquez, the program's Clinical Coordinator.   TO REGISTER: Please contact the clinic via FanXchange or call the nurse line directly at [phone] to inform our staff that you would like an invite sent to you and to let us know the email you would like the invite sent to. Prior to the meeting, a link with directions on how to join the meeting will be sent to you.    2021 Meetings  August 18: \"Let's Talk\" a time for the group to share.  September 15: \"Let's Talk\" a time for the group to share.  October 20: \"Let's Talk\" a time for the group to share.  November 17: Janet Mccullough RD, RAVEN \"Protein, Metabolism and Meal Planning\"  December 15: Esteban Lisa RD, LD will speak, \"Recipe Modification\"    [facility] Support Groups    Connections: Bariatric Care Support Group?  This is open to all [facility] (and those external to this program) pre- and post- operative bariatric surgery patients as well as their " "support system.   WHEN: This group meets the 2nd Tuesday of each month from 6:30 PM - 8:00 PM virtually using Microsoft Teams.   FACILITATOR: Led by Stiven Gonzalez, Ph.D who is a Licensed Psychologist with the [facility] Comprehensive Weight Management Program.   TO REGISTER: Please send an email to Stiven Gonzalez, Ph.D., LP at?[email or web address]?if you would like an invitation to the group and to learn about using Microsoft Teams.    2021 Meetings  August 10: Open Forum  September 14: Guest Speaker: Jes Curry RN,CBN, CIC, [facility] Comprehensive Weight Management Program, \"Your Hospital Stay and Post-Operative Compliance\"  October 12: Open Forum  November 9: Guest Speaker: Fernanda Caruso RD,LD, [facility] Comprehensive Weight Management Program,\"Holiday Eating\".  December 14: Guest Speaker Gillian Mcnamara MD, MPH, [facility], [facility], \"Body Contouring Surgery for Bariatric Surgery Patients\"     Connections: Post-Operative Bariatric Surgery Support Group  This is a support group for [facility] bariatric patients (and those external to [facility]) who have had bariatric surgery and are at least 3 months post-surgery.  WHEN: This support group meets the 4th Wednesday of the month from 11:00 AM - 12:00 PM virtually using Microsoft Teams.   FACILITATOR: Led by Certified Bariatric Nurse, Jes Curry RN.   TO REGISTER: Please send an email to Jes at [email or web address] if you would like an invitation to the group and to learn about using [employer]ita.      [facility] Healthy Lifestyle Virtual Support Group    Healthy Lifestyle Virtual Support Group?  This is 60 minutes of small group guided discussion, support and resources. All are welcome who want a healthy lifestyle.  WHEN: This group meets monthly on a Friday from 12:30 PM - to 1:30 PM virtually using Microsoft Teams.   FACILITATOR: Led by National Board " Certified Health , Jes Meadows [facility].   TO REGISTER: Please send an email to Jes [email or web address] to receive monthly invites to the group or if you have any questions about having a health .  Prior to the meeting, a link with directions on how to join the meeting will be sent to you.    2021 Meetings  August 27: Open Forum  September 24: Sleep and the 7 Types of Rest  October 29: Open Forum  November 19: Gratitude     December 10: Open Forum

## 2021-09-14 ENCOUNTER — TELEPHONE (OUTPATIENT)
Dept: ENDOCRINOLOGY | Facility: CLINIC | Age: 66
End: 2021-09-14

## 2021-09-14 NOTE — TELEPHONE ENCOUNTER
lvm to schedule 3 month folow up with Mireille Aguirre, left call center phone number and sent [employer].

## 2021-09-17 NOTE — PROGRESS NOTES
Discharge Note    Progress reporting period is from initial eval/last PN to May 14, 2021.     Elizabeth failed to return for next follow up visit and current status is unknown.  Please see information below for last relevant information on current status.  Patient seen for 11 visits.  SUBJECTIVE  Subjective changes noted by patient:  right knee a little more painful.  Reports has not walked or done any ex due to mother dying unexpectedly 5/9/2021 and death of brother 3-4 weeks ago  .  Current pain level is 5/10.     Previous pain level was  8/10.   Changes in function:  Yes (See Goal flowsheet attached for changes in current functional level)  Adverse reaction to treatment or activity: None    OBJECTIVE  Changes noted in objective findings: Able to resume ex per below.  Tolerated 7 min on Alter G then asked to stop due to fatigue.  Right knee pain stepping down/out of Alter G (walker used for step down but not for amb)     ASSESSMENT/PLAN  Diagnosis: bilateral knee pain  patella maltracking per MD   DIAGP:  The encounter diagnosis was Chronic pain of both knees.     STG/LTGs have been met or progress has been made towards goals:  Yes, please see goal flowsheet for most current information  Assessment of Progress: current status is unknown.    Last current status:     Self Management Plans:  HEP  I have re-evaluated this patient and find that the nature, scope, duration and intensity of the therapy is appropriate for the medical condition of the patient.  Elizabeth continues to require the following intervention to meet STG and LTG's:  HEP.    Recommendations:  Discharge with current home program.  Patient to follow up with MD as needed.    Please refer to the daily flowsheet for treatment today, total treatment time and time spent performing 1:1 timed codes.

## 2021-09-23 ENCOUNTER — VIRTUAL VISIT (OUTPATIENT)
Dept: PSYCHIATRY | Facility: CLINIC | Age: 66
End: 2021-09-23
Payer: MEDICARE

## 2021-09-23 ENCOUNTER — TELEPHONE (OUTPATIENT)
Dept: TRANSPLANT | Facility: CLINIC | Age: 66
End: 2021-09-23

## 2021-09-23 DIAGNOSIS — Z48.298 AFTERCARE FOLLOWING ORGAN TRANSPLANT: ICD-10-CM

## 2021-09-23 DIAGNOSIS — F33.1 MAJOR DEPRESSIVE DISORDER, RECURRENT EPISODE, MODERATE (H): ICD-10-CM

## 2021-09-23 DIAGNOSIS — F51.5 NIGHTMARES ASSOCIATED WITH CHRONIC POST-TRAUMATIC STRESS DISORDER: ICD-10-CM

## 2021-09-23 DIAGNOSIS — F43.10 POSTTRAUMATIC STRESS DISORDER: Primary | ICD-10-CM

## 2021-09-23 DIAGNOSIS — Z94.0 KIDNEY TRANSPLANTED: ICD-10-CM

## 2021-09-23 DIAGNOSIS — F43.12 NIGHTMARES ASSOCIATED WITH CHRONIC POST-TRAUMATIC STRESS DISORDER: ICD-10-CM

## 2021-09-23 DIAGNOSIS — F60.81 NARCISSISTIC PERSONALITY DISORDER (H): ICD-10-CM

## 2021-09-23 RX ORDER — PRAZOSIN HYDROCHLORIDE 5 MG/1
CAPSULE ORAL
Qty: 90 CAPSULE | Refills: 3 | Status: SHIPPED | OUTPATIENT
Start: 2021-09-23 | End: 2021-12-14

## 2021-09-23 RX ORDER — VILAZODONE HYDROCHLORIDE 40 MG/1
40 TABLET ORAL DAILY
Qty: 30 TABLET | Refills: 3 | Status: SHIPPED | OUTPATIENT
Start: 2021-09-23 | End: 2021-12-14

## 2021-09-23 RX ORDER — PRAZOSIN HYDROCHLORIDE 2 MG/1
CAPSULE ORAL
Qty: 30 CAPSULE | Refills: 3 | Status: SHIPPED | OUTPATIENT
Start: 2021-09-23 | End: 2021-12-14

## 2021-09-23 RX ORDER — MYCOPHENOLIC ACID 180 MG/1
900 TABLET, DELAYED RELEASE ORAL 2 TIMES DAILY
Qty: 300 TABLET | Refills: 11 | Status: SHIPPED | OUTPATIENT
Start: 2021-09-23 | End: 2021-09-24

## 2021-09-23 RX ORDER — ARIPIPRAZOLE 2 MG/1
3 TABLET ORAL 2 TIMES DAILY
Qty: 45 TABLET | Refills: 3 | Status: SHIPPED | OUTPATIENT
Start: 2021-09-23 | End: 2021-12-14

## 2021-09-23 ASSESSMENT — PATIENT HEALTH QUESTIONNAIRE - PHQ9: SUM OF ALL RESPONSES TO PHQ QUESTIONS 1-9: 11
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
## 2021-09-23 NOTE — TELEPHONE ENCOUNTER
Patient Call: Medication Refill  Route to LPN  Instruct the patient to first contact their pharmacy. If they have called their pharmacy and require further assistance, route to LPN.      mycophenolic acid (GENERIC EQUIVALENT) 180 MG EC tablet  [id number] - [place], [place] - [address] AT [facility] [address] Phone:  [phone]   Fax:  [phone]          When will the patient be out of this medication?: Less than 3 days (Route high priority)

## 2021-09-24 DIAGNOSIS — Z94.0 KIDNEY TRANSPLANTED: ICD-10-CM

## 2021-09-24 DIAGNOSIS — Z48.298 AFTERCARE FOLLOWING ORGAN TRANSPLANT: ICD-10-CM

## 2021-09-24 RX ORDER — PRAZOSIN HYDROCHLORIDE 5 MG/1
CAPSULE ORAL
Qty: 270 CAPSULE | OUTPATIENT
Start: 2021-09-24

## 2021-09-24 RX ORDER — MYCOPHENOLIC ACID 180 MG/1
900 TABLET, DELAYED RELEASE ORAL 2 TIMES DAILY
Qty: 300 TABLET | Refills: 11 | Status: SHIPPED | OUTPATIENT
Start: 2021-09-24 | End: 2022-02-18

## 2021-09-24 NOTE — TELEPHONE ENCOUNTER
Patient Call: Medication Refill  Route to LPN  Instruct the patient to first contact their pharmacy. If they have called their pharmacy and require further assistance, route to LPN.    [facility] Name: [facility]  Pharmacy Location: [place]  Name of Medication: Bactrim  Was missed on refills yesterday  When will the patient be out of this medication?: Less than 3 days (Route high priority)

## 2021-09-27 DIAGNOSIS — D84.9 IMMUNOSUPPRESSION (H): ICD-10-CM

## 2021-09-27 DIAGNOSIS — Z94.0 KIDNEY TRANSPLANTED: Primary | ICD-10-CM

## 2021-09-27 DIAGNOSIS — H40.9 MILD STAGE GLAUCOMA: ICD-10-CM

## 2021-09-27 RX ORDER — LATANOPROST 50 UG/ML
1 SOLUTION/ DROPS OPHTHALMIC AT BEDTIME
Qty: 7.5 ML | Refills: 0 | Status: SHIPPED | OUTPATIENT
Start: 2021-09-27 | End: 2021-12-23

## 2021-09-27 NOTE — TELEPHONE ENCOUNTER
[facility] Call Center    Phone Message    May a detailed message be left on voicemail: yes     Reason for Call: Medication Refill Request    Has the patient contacted the pharmacy for the refill? Yes   Name of medication being requested: sulfamethoxazole-trimethoprim (BACTRIM) 400-80 MG tablet [[id number]] (Order [id number])  Provider who prescribed the medication: Christian Jimenez MD  Pharmacy: [id number] [place] [address] [place] [facility] [address]    Date medication is needed: ASAP      Action Taken: Message routed to:  [facility] ([facility] Nephrology    Travel Screening: Not Applicable

## 2021-09-27 NOTE — TELEPHONE ENCOUNTER
Last Clinic Visit: 11/5/20, recommended 1 year follow up, [place] 10/21/21  Last clinic note:  Elizabeth Palma is a 65 year old female with the following diagnoses:   1. PCO (posterior capsular opacification), left    2. Divergence insufficiency    3. Vitreous strands of both eyes    4. Dry eye syndrome of both eyes       Diplopia resolved, but more blur with Fresnel  Given new prescription to add prism to glasses     Floaters improved with last laser  Looks good today, visual axis clear  Discussed symptoms of retinal tear/detachment and the need to be seen urgently should they occur

## 2021-09-28 ENCOUNTER — VIRTUAL VISIT (OUTPATIENT)
Dept: INTERNAL MEDICINE | Facility: CLINIC | Age: 66
End: 2021-09-28
Payer: MEDICARE

## 2021-09-28 DIAGNOSIS — J01.90 ACUTE SINUSITIS TREATED WITH ANTIBIOTICS IN THE PAST 60 DAYS: Primary | ICD-10-CM

## 2021-09-28 PROCEDURE — 99442 PR PHYSICIAN TELEPHONE EVALUATION 11-20 MIN: CPT | Mod: 95 | Performed by: NURSE PRACTITIONER

## 2021-09-28 RX ORDER — GUAIFENESIN 600 MG/1
1200 TABLET, EXTENDED RELEASE ORAL 2 TIMES DAILY
Qty: 10 TABLET | Refills: 0 | Status: SHIPPED | OUTPATIENT
Start: 2021-09-28 | End: 2021-10-03

## 2021-09-28 RX ORDER — AZITHROMYCIN 250 MG/1
TABLET, FILM COATED ORAL
Qty: 6 TABLET | Refills: 0 | Status: SHIPPED | OUTPATIENT
Start: 2021-09-28 | End: 2021-10-03

## 2021-09-28 RX ORDER — SULFAMETHOXAZOLE AND TRIMETHOPRIM 400; 80 MG/1; MG/1
1 TABLET ORAL DAILY
Qty: 90 TABLET | Refills: 3 | Status: SHIPPED | OUTPATIENT
Start: 2021-09-28

## 2021-09-28 NOTE — NURSING NOTE
Chief Complaint   Patient presents with     Recheck Medication     current antibiotic not working (been taking for a week)       Britany Gonsales, EMT at 1:49 PM on 9/28/2021

## 2021-09-28 NOTE — PROGRESS NOTES
Elizabeth Palma is a 66 year old female year old who is being evaluated via a billable telephone visit.      How would you like to obtain your AVS? MyChart  If the video visit is dropped, the invitation should be resent by: Text to cell phone: [phone]  Will anyone else be joining your video visit? No      Telephone Start Time: 2:36      Subjective   Elizabeth Palma who presents for the following health issues : F/u from [facility] ER visit for sinusitis.    Patient Active Problem List   Diagnosis     Hypertension     Hyperlipidemia     Abnormal MRI, cervical spine     Sensory loss     Premature menopause age 37     OP (osteoporosis) T score -3.8     Major depressive disorder, recurrent episode, moderate (H)     Generalized anxiety disorder     CMC DJD(carpometacarpal degenerative joint disease), localized primary     Pain in joint, forearm -- L unhealed Fx     -donor kidney transplant     Immunosuppressed status (H)     Hyperparathyroidism, secondary (H)     Hyperparathyroidism (H)     Senile osteoporosis     Pain in joint involving ankle and foot     Nightmares associated with chronic post-traumatic stress disorder     DM type 2 with Neuropathy, 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 on 21     Posttraumatic stress disorder     Plantar fasciitis, bilateral     Right knee pain     Age-related osteoporosis without current pathological fracture     Narcissistic personality disorder (H)     Personal history of other drug therapy     Median sensory neuropathy, left     Marital conflict     Suicidal ideation     Autosomal dominant polycystic kidney disease     Secondary hyperparathyroidism (H)     Anemia, iron deficiency     Morbid obesity (H)     Chronic kidney disease, stage 3     PCKD, S/P Renal Transplant  ([facility])     Intractable back pain     UTI     Bacteremia       HPI   Elizabeth Palma was seen at [facility] on 21 for sinusitis symptoms and a cough.  She had an xray and was discharged with Viibryd 40 mg.  She  "states the antibiotic did not alter her symptoms.  She is still coughing \"all the time\" and it I we.  She wheezes and the coughing temporarily clears the cough. She is using her albuterol inhaler.  She has a sinus headache. Sputum when coughing is clear and thick.  Nasal secretions: same.   Deneis fever, chills, sore throat.       Review of Systems   See above.         Objective     Vitals: No vitals were obtained today due to virtual visit.    Physical Exam   This was a telephone visit.  No examShe coughed once during the interview.     Assessment and Plan  Elizabeth was seen today for recheck medication.    Diagnoses and all orders for this visit:    Acute sinusitis treated with antibiotics in the past 60 days  -     azithromycin (ZITHROMAX) 250 MG tablet; Take 2 tablets (500 mg) by mouth daily for 1 day, THEN 1 tablet (250 mg) daily for 4 days.  -     guaiFENesin (MUCINEX) 600 MG 12 hr tablet; Take 2 tablets (1,200 mg) by mouth 2 times daily for 5 days    Call if no improvement.       Video-Visit Details    Type of service:  Video Visit    Call end time 2:47     Originating Location (pt. Location): Home    Distant Location (provider location):  [facility] [place]       Total time spent today with this patient including chart review, exam time with patient and documentation : 12 minutes phone time, 6 minutes Care Everywhere chart review and 8 minutes documentation.     Karen HENNESSY CNP    "

## 2021-09-29 DIAGNOSIS — H40.9 MILD STAGE GLAUCOMA: ICD-10-CM

## 2021-09-29 RX ORDER — LATANOPROST 50 UG/ML
SOLUTION/ DROPS OPHTHALMIC
Qty: 7.5 ML | Refills: 0 | OUTPATIENT
Start: 2021-09-29

## 2021-09-29 NOTE — TELEPHONE ENCOUNTER
LATANOPROST 0.005% OPHTH SOLN 2.5ML   Disp Refills Start End GIULIA   latanoprost (XALATAN) 0.005 % ophthalmic solution 7.5 mL 0 9/27/2021  No   Sig - Route: Place 1 drop into both eyes At Bedtime - Both Eyes   Sent to pharmacy as: Latanoprost 0.005 % Ophthalmic Solution (XALATAN)   Class: E-Prescribe   Order: [id number]   E-Prescribing Status: Receipt confirmed by pharmacy (9/27/2021  1:39 PM CDT)       Printout Tracking    External Result Report     Pharmacy    [id number] - [place], [place] - [address] AT [facility] [address]

## 2021-09-30 DIAGNOSIS — E66.01 MORBID OBESITY DUE TO EXCESS CALORIES (H): ICD-10-CM

## 2021-10-01 RX ORDER — TOPIRAMATE 200 MG/1
TABLET, FILM COATED ORAL
Qty: 180 TABLET | Refills: 1 | OUTPATIENT
Start: 2021-10-01

## 2021-10-01 NOTE — TELEPHONE ENCOUNTER
Refill denied at this time. Patient needs appointment with Dr. Irene. aFiza message sent to patient to schedule appointment.

## 2021-10-01 NOTE — TELEPHONE ENCOUNTER
Received refill request for  topiramate   . Patient needs appointment scheduled prior to any refills. Clinic Coordinator notified and will follow up with the patient as appropriate. The pharmacy has been notified that the medication will not be refilled prior to an appointment being scheduled.    lv  3/1/21  NV:none

## 2021-10-06 ENCOUNTER — DOCUMENTATION ONLY (OUTPATIENT)
Dept: PODIATRY | Facility: CLINIC | Age: 66
End: 2021-10-06

## 2021-10-06 NOTE — PROGRESS NOTES
Updated plan of progress form signed by Dr. Tuttle and faxed to [phone] on 10.6.21. Sent to be scanned into patient's chart.     Aida Vela

## 2021-10-08 ENCOUNTER — TELEPHONE (OUTPATIENT)
Dept: FAMILY MEDICINE | Facility: CLINIC | Age: 66
End: 2021-10-08

## 2021-10-08 ENCOUNTER — LAB (OUTPATIENT)
Dept: LAB | Facility: CLINIC | Age: 66
End: 2021-10-08
Payer: MEDICARE

## 2021-10-08 ENCOUNTER — OFFICE VISIT (OUTPATIENT)
Dept: FAMILY MEDICINE | Facility: CLINIC | Age: 66
End: 2021-10-08
Payer: MEDICARE

## 2021-10-08 VITALS
RESPIRATION RATE: 16 BRPM | HEIGHT: 60 IN | WEIGHT: 202 LBS | SYSTOLIC BLOOD PRESSURE: 126 MMHG | OXYGEN SATURATION: 98 % | TEMPERATURE: 98.1 F | DIASTOLIC BLOOD PRESSURE: 85 MMHG | BODY MASS INDEX: 39.66 KG/M2 | HEART RATE: 67 BPM

## 2021-10-08 DIAGNOSIS — R05.9 COUGH: ICD-10-CM

## 2021-10-08 DIAGNOSIS — R09.82 POST-NASAL DRIP: Primary | ICD-10-CM

## 2021-10-08 DIAGNOSIS — R53.83 FATIGUE, UNSPECIFIED TYPE: ICD-10-CM

## 2021-10-08 DIAGNOSIS — H61.23 BILATERAL IMPACTED CERUMEN: ICD-10-CM

## 2021-10-08 DIAGNOSIS — R61 NIGHT SWEATS: ICD-10-CM

## 2021-10-08 DIAGNOSIS — I50.31 ACUTE DIASTOLIC CONGESTIVE HEART FAILURE (H): ICD-10-CM

## 2021-10-08 LAB
TSH SERPL DL<=0.005 MIU/L-ACNC: 1.17 MU/L (ref 0.4–4)
WBC # BLD AUTO: 6.3 10E3/UL (ref 4–11)

## 2021-10-08 PROCEDURE — 36415 COLL VENOUS BLD VENIPUNCTURE: CPT | Performed by: PATHOLOGY

## 2021-10-08 PROCEDURE — U0003 INFECTIOUS AGENT DETECTION BY NUCLEIC ACID (DNA OR RNA); SEVERE ACUTE RESPIRATORY SYNDROME CORONAVIRUS 2 (SARS-COV-2) (CORONAVIRUS DISEASE [COVID-19]), AMPLIFIED PROBE TECHNIQUE, MAKING USE OF HIGH THROUGHPUT TECHNOLOGIES AS DESCRIBED BY CMS-2020-01-R: HCPCS | Performed by: NURSE PRACTITIONER

## 2021-10-08 PROCEDURE — 99214 OFFICE O/P EST MOD 30 MIN: CPT | Performed by: NURSE PRACTITIONER

## 2021-10-08 PROCEDURE — 84443 ASSAY THYROID STIM HORMONE: CPT | Performed by: PATHOLOGY

## 2021-10-08 PROCEDURE — 85048 AUTOMATED LEUKOCYTE COUNT: CPT | Performed by: PATHOLOGY

## 2021-10-08 ASSESSMENT — PAIN SCALES - GENERAL: PAINLEVEL: NO PAIN (0)

## 2021-10-08 ASSESSMENT — MIFFLIN-ST. JEOR: SCORE: 1387.77

## 2021-10-08 NOTE — PATIENT INSTRUCTIONS
Patient Education     Understanding Acute Rhinosinusitis  Acute rhinosinusitis is when the lining of the inside of the nose and the sinuses becomes irritated and swollen. It is also called sinusitis, or a sinus infection.  Sinuses are air-filled spaces in the skull behind the face. They are kept moist and clean by a lining of mucosa. Things such as pollen, smoke, and chemical fumes can irritate the mucosa. It can then swell up. As a response to irritation, the mucosa makes more mucus and other fluids. Tiny hairlike cilia cover the mucosa. Cilia help carry mucus toward the opening of the sinus. Too much mucus may cause the cilia to stop working. This blocks the sinus opening. A buildup of fluid in the sinuses then causes pain and pressure. It can also cause bacteria to grow in the sinuses.    What causes acute rhinosinusitis?  A sinus infection is most often caused by a virus. You are more likely to get one after having a cold or the flu. In some cases, a sinus infection can be caused by bacteria.  You are at higher risk for a sinus infection if you:    Are older in age    Have structural problems with your sinuses    Smoke or are exposed to secondhand smoke    Are exposed to changes in pressure, such as from flying a lot or deep sea diving    Have asthma or allergies    Have a weak immune system    Have dental disease     Symptoms of acute rhinosinusitis  Symptoms of acute rhinosinusitis often last around 7 to 10 days. If you have a bacterial infection, they may last longer. They may also get better but then worsen. You may have:    Face pain or pressure under the eyes and around the nose    Headache    Fluid draining in the back of the throat (postnasal drip)    Congestion    Drainage that is thick and colored (often green), instead of clear    Cough    Problems with your sense of smell    Ear pain or hearing problems    Fever    Tooth pain    Fatigue  Diagnosing acute rhinosinusitis  Your healthcare provider will  ask about your symptoms and past health. He or she will look at your ears, nose, throat, and sinuses. Imaging tests, such as X-rays, are often not needed.  It can be hard to figure out if a sinus infection is caused by a virus or bacterium. A bacterial infection tends to last longer. Symptoms may also get better but then worsen. Your healthcare provider may take a sample of mucus from your nose to check for bacteria.  Treating acute rhinosinusitis  Most sinus infections will go away within 10 days. Your body will fight off the virus. If your symptoms seem to get better but then worsen, you may have a bacterial infection instead. Your healthcare provider will then give you antibiotics. Take this medicine until it is gone, even if you feel better.  To help ease your symptoms, your healthcare provider may advise:    Over-the-counter pain relievers. Medicines such as acetaminophen or ibuprofen can ease sinus pain. They may also lower a fever.    Nasal washes. Washing your nasal passages with salt water may ease pain and pressure. It can rinse out mucous and other irritants from your sinuses. Your healthcare provider can show you how to do it.    Nasal steroid spray. This prescription medicine can reduce inflammation in your sinuses.    Other medicines. Decongestants, antihistamines, and other nasal sprays may give short-term relief. They may help with congestion. Talk with your healthcare provider before taking these medicines.     Preventing acute rhinosinusitis  You can help prevent a sinus infection with these steps:    Wash your hands well and often.    Stay away from people who have a cold or upper respiratory infection.    Don't smoke. And stay away from secondhand smoke.    Use a humidifier at home.    Make sure you are up-to-date on your vaccines, such as the flu shot.     When to call your healthcare provider  Call your healthcare provider right away if you have any of these:    Fever of 100.4 F (38 C) or  higher, or as directed by your healthcare provider    Pain that gets worse    Symptoms that don t get better, or get worse    New symptoms  revoPT last reviewed this educational content on 6/1/2019 2000-2021 [employer]. All rights reserved. This information is not intended as a substitute for professional medical care. Always follow your healthcare professional's instructions.         Follow up downstairs for blood work.I will call you when the results are in.     GERHARD Narayan CNP

## 2021-10-08 NOTE — NURSING NOTE
Chief Complaint   Patient presents with     Sinus Problem     Patient comes in to discuss sinus infection.         Kunal Dean MA on 10/8/2021 at 1:36 PM

## 2021-10-09 LAB — SARS-COV-2 RNA RESP QL NAA+PROBE: NEGATIVE

## 2021-10-11 DIAGNOSIS — K21.9 GASTROESOPHAGEAL REFLUX DISEASE WITHOUT ESOPHAGITIS: ICD-10-CM

## 2021-10-12 ENCOUNTER — TELEPHONE (OUTPATIENT)
Dept: INTERNAL MEDICINE | Facility: CLINIC | Age: 66
End: 2021-10-12

## 2021-10-12 NOTE — TELEPHONE ENCOUNTER
[facility] Call Center    Phone Message    May a detailed message be left on voicemail: yes     Reason for Call: Order(s): Other:   Reason for requested: Patient's  called in requesting a referral to ENT at the [facility] for clogged ear for over a month now.  Date needed: As soon as possible  Provider name: Dr. Sheridan      Action Taken: Message routed to:  [facility]    Travel Screening: Not Applicable

## 2021-10-13 NOTE — TELEPHONE ENCOUNTER
Left patient  VM.  Patient will need to be seen by Dr. Sheridan or any primary care provider for an evaluation first.  Provider need to evaluate patient condition or medical issues before the provider can place orders, including referrals.  Please schedule an in-person appt as the provider will need to look into patient ears.         Bertin Doherty CMA ([facility]) at 1:15 PM on 10/13/2021

## 2021-10-14 ENCOUNTER — LAB (OUTPATIENT)
Dept: LAB | Facility: CLINIC | Age: 66
End: 2021-10-14
Payer: MEDICARE

## 2021-10-14 ENCOUNTER — OFFICE VISIT (OUTPATIENT)
Dept: INTERNAL MEDICINE | Facility: CLINIC | Age: 66
End: 2021-10-14
Payer: MEDICARE

## 2021-10-14 ENCOUNTER — OFFICE VISIT (OUTPATIENT)
Dept: DERMATOLOGY | Facility: CLINIC | Age: 66
End: 2021-10-14
Payer: MEDICARE

## 2021-10-14 VITALS
DIASTOLIC BLOOD PRESSURE: 81 MMHG | HEIGHT: 61 IN | HEART RATE: 55 BPM | OXYGEN SATURATION: 99 % | SYSTOLIC BLOOD PRESSURE: 132 MMHG | WEIGHT: 194 LBS | BODY MASS INDEX: 36.63 KG/M2

## 2021-10-14 DIAGNOSIS — Z94.0 KIDNEY REPLACED BY TRANSPLANT: ICD-10-CM

## 2021-10-14 DIAGNOSIS — Z94.89 TRANSPLANT RECIPIENT: Primary | ICD-10-CM

## 2021-10-14 DIAGNOSIS — Z79.899 ENCOUNTER FOR LONG-TERM CURRENT USE OF MEDICATION: ICD-10-CM

## 2021-10-14 DIAGNOSIS — R05.9 COUGH: Primary | ICD-10-CM

## 2021-10-14 DIAGNOSIS — Z94.0 KIDNEY TRANSPLANTED: Primary | ICD-10-CM

## 2021-10-14 DIAGNOSIS — Z94.0 KIDNEY TRANSPLANTED: ICD-10-CM

## 2021-10-14 DIAGNOSIS — Z48.298 AFTERCARE FOLLOWING ORGAN TRANSPLANT: ICD-10-CM

## 2021-10-14 DIAGNOSIS — R05.3 CHRONIC COUGH: ICD-10-CM

## 2021-10-14 DIAGNOSIS — R05.9 COUGH: ICD-10-CM

## 2021-10-14 DIAGNOSIS — Z12.83 SCREENING EXAM FOR SKIN CANCER: ICD-10-CM

## 2021-10-14 DIAGNOSIS — L82.1 SK (SEBORRHEIC KERATOSIS): ICD-10-CM

## 2021-10-14 DIAGNOSIS — J45.40 MODERATE PERSISTENT ASTHMA, UNSPECIFIED WHETHER COMPLICATED: ICD-10-CM

## 2021-10-14 DIAGNOSIS — D22.9 MULTIPLE BENIGN MELANOCYTIC NEVI: ICD-10-CM

## 2021-10-14 LAB
ANION GAP SERPL CALCULATED.3IONS-SCNC: 5 MMOL/L (ref 3–14)
BASOPHILS # BLD AUTO: 0 10E3/UL (ref 0–0.2)
BASOPHILS NFR BLD AUTO: 1 %
BUN SERPL-MCNC: 13 MG/DL (ref 7–30)
CALCIUM SERPL-MCNC: 9.1 MG/DL (ref 8.5–10.1)
CHLORIDE BLD-SCNC: 111 MMOL/L (ref 94–109)
CO2 SERPL-SCNC: 24 MMOL/L (ref 20–32)
CREAT SERPL-MCNC: 1.03 MG/DL (ref 0.52–1.04)
CREAT UR-MCNC: 189 MG/DL
CRYPTOC AG SPEC QL: NEGATIVE
CYCLOSPORINE BLD LC/MS/MS-MCNC: 108 UG/L (ref 50–400)
EOSINOPHIL # BLD AUTO: 0.1 10E3/UL (ref 0–0.7)
EOSINOPHIL NFR BLD AUTO: 3 %
ERYTHROCYTE [DISTWIDTH] IN BLOOD BY AUTOMATED COUNT: 13.7 % (ref 10–15)
GFR SERPL CREATININE-BSD FRML MDRD: 58 ML/MIN/1.73M2
GLUCOSE BLD-MCNC: 152 MG/DL (ref 70–99)
HCT VFR BLD AUTO: 40.9 % (ref 35–47)
HGB BLD-MCNC: 13.3 G/DL (ref 11.7–15.7)
IMM GRANULOCYTES # BLD: 0.1 10E3/UL
IMM GRANULOCYTES NFR BLD: 1 %
LYMPHOCYTES # BLD AUTO: 0.5 10E3/UL (ref 0.8–5.3)
LYMPHOCYTES NFR BLD AUTO: 10 %
MCH RBC QN AUTO: 29.1 PG (ref 26.5–33)
MCHC RBC AUTO-ENTMCNC: 32.5 G/DL (ref 31.5–36.5)
MCV RBC AUTO: 90 FL (ref 78–100)
MONOCYTES # BLD AUTO: 0.3 10E3/UL (ref 0–1.3)
MONOCYTES NFR BLD AUTO: 6 %
NEUTROPHILS # BLD AUTO: 3.8 10E3/UL (ref 1.6–8.3)
NEUTROPHILS NFR BLD AUTO: 79 %
NRBC # BLD AUTO: 0 10E3/UL
NRBC BLD AUTO-RTO: 0 /100
PLATELET # BLD AUTO: 161 10E3/UL (ref 150–450)
POTASSIUM BLD-SCNC: 3.9 MMOL/L (ref 3.4–5.3)
PROT UR-MCNC: 0.31 G/L
PROT/CREAT 24H UR: 0.16 G/G CR (ref 0–0.2)
RBC # BLD AUTO: 4.57 10E6/UL (ref 3.8–5.2)
SODIUM SERPL-SCNC: 140 MMOL/L (ref 133–144)
TME LAST DOSE: NORMAL H
TME LAST DOSE: NORMAL H
WBC # BLD AUTO: 4.8 10E3/UL (ref 4–11)

## 2021-10-14 PROCEDURE — 87385 HISTOPLASMA CAPSUL AG IA: CPT | Performed by: PEDIATRICS

## 2021-10-14 PROCEDURE — 80048 BASIC METABOLIC PNL TOTAL CA: CPT | Performed by: PATHOLOGY

## 2021-10-14 PROCEDURE — 99213 OFFICE O/P EST LOW 20 MIN: CPT | Mod: GC

## 2021-10-14 PROCEDURE — 80180 DRUG SCRN QUAN MYCOPHENOLATE: CPT | Performed by: INTERNAL MEDICINE

## 2021-10-14 PROCEDURE — 85025 COMPLETE CBC W/AUTO DIFF WBC: CPT | Performed by: PATHOLOGY

## 2021-10-14 PROCEDURE — 87899 AGENT NOS ASSAY W/OPTIC: CPT | Performed by: PEDIATRICS

## 2021-10-14 PROCEDURE — 87305 ASPERGILLUS AG IA: CPT | Mod: 90 | Performed by: PATHOLOGY

## 2021-10-14 PROCEDURE — 87449 NOS EACH ORGANISM AG IA: CPT | Performed by: PEDIATRICS

## 2021-10-14 PROCEDURE — 84156 ASSAY OF PROTEIN URINE: CPT | Performed by: PATHOLOGY

## 2021-10-14 PROCEDURE — 80158 DRUG ASSAY CYCLOSPORINE: CPT | Performed by: INTERNAL MEDICINE

## 2021-10-14 PROCEDURE — 99214 OFFICE O/P EST MOD 30 MIN: CPT | Mod: GC | Performed by: PEDIATRICS

## 2021-10-14 PROCEDURE — 36415 COLL VENOUS BLD VENIPUNCTURE: CPT | Performed by: PATHOLOGY

## 2021-10-14 RX ORDER — OMEPRAZOLE 40 MG/1
40 CAPSULE, DELAYED RELEASE ORAL DAILY
Qty: 90 CAPSULE | Refills: 1 | Status: SHIPPED | OUTPATIENT
Start: 2021-10-14 | End: 2022-01-13 | Stop reason: ALTCHOICE

## 2021-10-14 ASSESSMENT — PAIN SCALES - GENERAL
PAINLEVEL: MILD PAIN (3)
PAINLEVEL: NO PAIN (0)

## 2021-10-14 ASSESSMENT — MIFFLIN-ST. JEOR: SCORE: 1367.36

## 2021-10-14 NOTE — PATIENT INSTRUCTIONS
Please go downstairs to the lab for the labs we ordered.   We have also placed a referral to get Pulmonary Function Tests. You will get called to schedule it. If you do not receive a call within 2 -3 business days please call, [phone].       For your flight - use vix vapor rub and the nasal spray (Afrin)(once) for the trip should you need it.

## 2021-10-14 NOTE — LETTER
10/14/2021       RE: Elizabeth Palma  [address]  [address]  [facility] [address]     Dear Colleague,    Thank you for referring your patient, Elizabeth Palma, to the [facility] [place] at [facility]. Please see a copy of my visit note below.    [facility] Dermatology Note  Encounter Date: Oct 14, 2021  Office Visit     Dermatology Problem List:  # Renal transplant 3/20/14, on cyclosporine and mycophenolate mofetil  ____________________________________________    Assessment & Plan:     # Seborrheic keratosis, non-irritated. Discussed the natural history and benign nature of this lesion. Reassurance provided that no additional treatment is necessary.     # Benign-appearing yellow papule on right perioral cheek. No concerning features clinically or on dermoscopy. Will continue to monitor at next visit     # Hx of transplant with resulting IMSP, currently on cyclosporine and cellcept. Discussed higher risk for cutaneous malignancy due to loss of normal immune surveillance while on these medications. Discussed health of the transplanted organ is paramount and comes before risk of skin cancers, which can be detected and managed if they develop.  - Photoprotection discussed (sunscreen, sun avoidance)  - Advised skin checks at regular yearly intervals. Will continue to monitor for changing or symptomatic lesions.      Procedures Performed:   None     Follow-up: 1 year(s) in-person, or earlier for new or changing lesions    Staff and Resident:     Bobbi Mooney MD PGY3    Patient was staffed with Dr. Bermudez     Patient was seen and examined with the dermatology resident. I agree with the history, review of systems, physical examination, assessments and plan.    Jory Bermudez MD  Professor and  Chair  Department of Dermatology  [facility]  ____________________________________________    CC: Derm Problem  (Elizabeth is here for a skin check, said she has no new areas of concern.)    HPI:  Ms. Elizabeth Palma is a(n) 66 year old female who presents today as a return patient for FBSE. Present to clinic with  today who provides supplemental history. History of renal transplant in 2014, on cyclosporine and mycophenolate mofetil. She denies any new lesions of concern. No bleeding, tender, or growing lesions. No significant sun exposure. Otherwise no concerns. Patient is otherwise feeling well, without additional skin concerns.    Labs Reviewed:  N/A    Physical Exam:  Vitals: LMP  (LMP Unknown)   SKIN: Total skin excluding the undergarment areas was performed. The exam included the head/face, neck, both arms, chest, back, abdomen, both legs, digits and/or nails.   - There is a yellow globular appearing papule on the right side of face near oral commissure   - Multiple regular brown pigmented macules and papules are identified on the back and arms.   - There are few waxy stuck on tan to brown papules on the back.   - There is a healing scar on the left lower extremity   - No other lesions of concern on areas examined.     Medications:  Current Outpatient Medications   Medication     acetaminophen (TYLENOL) 500 MG tablet     ACETYLCYSTEINE PO     albuterol (PROAIR HFA/PROVENTIL HFA/VENTOLIN HFA) 108 (90 Base) MCG/ACT inhaler     ARIPiprazole (ABILIFY) 2 MG tablet     aspirin EC 81 MG EC tablet     blood glucose monitoring (ACCU-CHEK RALPH PLUS) meter device kit     blood glucose monitoring (NO BRAND SPECIFIED) meter device kit     blood glucose monitoring (NO BRAND SPECIFIED) test strip     blood glucose monitoring (SOFTCLIX) lancets     Cholecalciferol (VITAMIN D) 1000 UNITS capsule     cyanocolbalamin (VITAMIN  B-12) 1000 MCG tablet     cycloSPORINE modified (GENERIC EQUIVALENT) 25 MG capsule     estradiol (VAGIFEM) 10 MCG TABS vaginal tablet     ferrous sulfate (FEROSUL) 325 (65 Fe) MG tablet     fluticasone (FLONASE)  50 MCG/ACT nasal spray     furosemide (LASIX) 40 MG tablet     gabapentin (NEURONTIN) 300 MG capsule     latanoprost (XALATAN) 0.005 % ophthalmic solution     metFORMIN (GLUCOPHAGE-XR) 500 MG 24 hr tablet     metolazone (ZAROXOLYN) 5 MG tablet     mycophenolic acid (GENERIC EQUIVALENT) 180 MG EC tablet     nystatin (MYCOSTATIN) 045983 UNIT/GM external cream     omeprazole (PRILOSEC) 40 MG DR capsule     ondansetron (ZOFRAN-ODT) 4 MG ODT tab     order for DME     order for DME     Polyvinyl Alcohol-Povidone (REFRESH OP)     prazosin (MINIPRESS) 2 MG capsule     prazosin (MINIPRESS) 2 MG capsule     prazosin (MINIPRESS) 5 MG capsule     simvastatin (ZOCOR) 20 MG tablet     sulfamethoxazole-trimethoprim (BACTRIM) 400-80 MG tablet     topiramate (TOPAMAX) 200 MG tablet     Vaginal Lubricant (REPLENS) GEL     vilazodone (VIIBRYD) 40 MG TABS tablet     No current facility-administered medications for this visit.      Past Medical History:   Patient Active Problem List   Diagnosis     Hypertension     Hyperlipidemia     Abnormal MRI, cervical spine     Sensory loss     Premature menopause age 37     OP (osteoporosis) T score -3.8     Major depressive disorder, recurrent episode, moderate (H)     Generalized anxiety disorder     CMC DJD(carpometacarpal degenerative joint disease), localized primary     Pain in joint, forearm -- L unhealed Fx     -donor kidney transplant     Immunosuppressed status (H)     Hyperparathyroidism, secondary (H)     Hyperparathyroidism (H)     Senile osteoporosis     Pain in joint involving ankle and foot     Nightmares associated with chronic post-traumatic stress disorder     DM type 2 with Neuropathy, 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 on 21     Posttraumatic stress disorder     Plantar fasciitis, bilateral     Right knee pain     Age-related osteoporosis without current pathological fracture     Narcissistic personality disorder (H)     Personal history of other drug therapy     Median sensory  neuropathy, left     Marital conflict     Suicidal ideation     Autosomal dominant polycystic kidney disease     Secondary hyperparathyroidism (H)     Anemia, iron deficiency     Morbid obesity (H)     Chronic kidney disease, stage 3 (H)     PCKD, S/P Renal Transplant  ([facility])     Intractable back pain     UTI     Bacteremia     Acute diastolic congestive heart failure (H)     Past Medical History:   Diagnosis Date     Abnormal MRI, cervical spine 10/15/2011    2011; mild changes noted. Study done for left arm symptoms Impression:  1. Mild multilevel degenerative disc disease with no significant canal or neural stenosis seen. motion artifact on the STIR images in these are not interpretable. The remaining images were interpreted      Autosomal dominant polycystic kidney disease 2011     (Problem list name updated by automated process. Provider to review and confirm.)     CMC DJD(carpometacarpal degenerative joint disease), localized primary 2013     -donor kidney transplant 2014     Gastroesophageal reflux disease      Generalized anxiety disorder 11/15/2012     Glaucoma      Hyperlipidemia 10/15/2011     Hyperparathyroidism, secondary (H) 2015     Hypertension     resolved     Immunosuppressed status (H) 2014     Major depressive disorder, recurrent episode, moderate (H) 11/15/2012     Obesity (BMI 30-39.9)      OP (osteoporosis) T score -3.8 2009 T-score -3.7      LION (obstructive sleep apnoea) 10/15/2012    reported intolerant to CPAP -- she says she doesn't have LION     Pain in joint, forearm -- L unhealed Fx 2013     Premature menopause age 37 07/10/2012    OCP (vaginal bldg)-->HT which she stopped 2 mo later documented at 2007 visit (age 51).      Restless leg syndrome      Stiffness of joint, not elsewhere classified, hand 2013     Tremor 10/15/2011    head     Type 2 DM with Neuropathy     started with gestational diabetes      Uncomplicated asthma        CC Horacio Sheridan MD  [address] [facility] [address] [facility] [address]  [place] [address] on close of this encounter.

## 2021-10-14 NOTE — PROGRESS NOTES
PRIMARY CARE CENTER     Patient Name: Elizabeth Palma  YOB: 1955  MRN: [id number]    Date of Service: October 14, 2021  Chief Complaint: cough, night sweats, ear fullness for the past 2 months.          HISTORY OF PRESENT ILLNESS:    Elizabeth Palma is a 66 y.o. female with a history of asthma, who presents with a 2 month history of cough, worsening dyspnea at baseline and bilateral ear fullness.     According to patient her symptoms began about 2 months ago after she returned from a family trip to [facility]. It started off as a sore-throat, cough that has persisted with intermittent greenish sputum, then came nasal and sinus pressure and then ear fullness. It then progressed to wheezing and worsening dyspnea, and she has had to use her albuterol rescue inhaler now almost daily and up to twice some others. She reports that her symptoms aren't necessarily worse at certain times or related to position.  ACT score of 11 as reported by patient questionnaire.     Per chart review patient has been seen in the last 2 months at multiple locations most recently on 10/8 here. A CXR in [facility] from 9/22/21 was normal no infiltrate, a bilateral ear washout for impacted cerumen was performed on 10/8, but according to patient none of her symptoms have improved.       Patient concerns: flying at the end of the month to [place]. Afraid the pressure in her ears will be worsened by flight. Encouraged that she can try vix vapor rub and a little bit of Afrin     She denies weight changes, fevers, chills, orthopnea, paroxysmal nocturnal dyspnea, changes in detergent/lotion/soap, dysphagia or odynophagia, recent sick contacts, international travel, nocturia.      She endorses a loss of appetite, domestic travel to [place] and [place],  night sweats (3x per week for the last month or so), worsening baseline shortness of breath as above, and increased use of her albuterol rescue inhaler.      Medications and allergies reviewed by me today.          PAST MEDICAL HISTORY:     Past Medical History:   Diagnosis Date     Abnormal MRI, cervical spine 10/15/2011    2011; mild changes noted. Study done for left arm symptoms Impression:  1. Mild multilevel degenerative disc disease with no significant canal or neural stenosis seen. motion artifact on the STIR images in these are not interpretable. The remaining images were interpreted      Autosomal dominant polycystic kidney disease 2011     (Problem list name updated by automated process. Provider to review and confirm.)     CMC DJD(carpometacarpal degenerative joint disease), localized primary 2013     -donor kidney transplant 2014     Gastroesophageal reflux disease      Generalized anxiety disorder 11/15/2012     Glaucoma      Hyperlipidemia 10/15/2011     Hyperparathyroidism, secondary (H) 2015     Hypertension     resolved     Immunosuppressed status (H) 2014     Major depressive disorder, recurrent episode, moderate (H) 11/15/2012     Obesity (BMI 30-39.9)      OP (osteoporosis) T score -3.8 2009 T-score -3.7      LION (obstructive sleep apnoea) 10/15/2012    reported intolerant to CPAP -- she says she doesn't have LION     Pain in joint, forearm -- L unhealed Fx 2013     Premature menopause age 37 07/10/2012    OCP (vaginal bldg)-->HT which she stopped 2 mo later documented at 2007 visit (age 51).      Restless leg syndrome      Stiffness of joint, not elsewhere classified, hand 2013     Tremor 10/15/2011    head     Type 2 DM with Neuropathy 1985    started with gestational diabetes     Uncomplicated asthma             REVIEW OF SYSTEMS:     10 point ROS was negative except as noted in HPI         HOME MEDICATIONS:     Current Outpatient Medications   Medication     acetaminophen (TYLENOL) 500 MG tablet     ACETYLCYSTEINE PO     albuterol (PROAIR HFA/PROVENTIL HFA/VENTOLIN HFA) 108  "(90 Base) MCG/ACT inhaler     ARIPiprazole (ABILIFY) 2 MG tablet     aspirin EC 81 MG EC tablet     blood glucose monitoring (ACCU-CHEK RALPH PLUS) meter device kit     blood glucose monitoring (NO BRAND SPECIFIED) meter device kit     blood glucose monitoring (NO BRAND SPECIFIED) test strip     blood glucose monitoring (SOFTCLIX) lancets     Cholecalciferol (VITAMIN D) 1000 UNITS capsule     cyanocolbalamin (VITAMIN  B-12) 1000 MCG tablet     cycloSPORINE modified (GENERIC EQUIVALENT) 25 MG capsule     estradiol (VAGIFEM) 10 MCG TABS vaginal tablet     ferrous sulfate (FEROSUL) 325 (65 Fe) MG tablet     fluticasone (FLONASE) 50 MCG/ACT nasal spray     furosemide (LASIX) 40 MG tablet     gabapentin (NEURONTIN) 300 MG capsule     latanoprost (XALATAN) 0.005 % ophthalmic solution     metFORMIN (GLUCOPHAGE-XR) 500 MG 24 hr tablet     metolazone (ZAROXOLYN) 5 MG tablet     mycophenolic acid (GENERIC EQUIVALENT) 180 MG EC tablet     nystatin (MYCOSTATIN) 869782 UNIT/GM external cream     omeprazole (PRILOSEC) 40 MG DR capsule     ondansetron (ZOFRAN-ODT) 4 MG ODT tab     order for DME     order for DME     Polyvinyl Alcohol-Povidone (REFRESH OP)     prazosin (MINIPRESS) 2 MG capsule     prazosin (MINIPRESS) 2 MG capsule     prazosin (MINIPRESS) 5 MG capsule     simvastatin (ZOCOR) 20 MG tablet     sulfamethoxazole-trimethoprim (BACTRIM) 400-80 MG tablet     topiramate (TOPAMAX) 200 MG tablet     Vaginal Lubricant (REPLENS) GEL     vilazodone (VIIBRYD) 40 MG TABS tablet     No current facility-administered medications for this visit.            PHYSICAL EXAM:   Vitals: /81 (BP Location: Right arm, Patient Position: Sitting, Cuff Size: Adult Large)   Pulse 55   Ht 1.549 m (5' 1\")   Wt 88 kg (194 lb)   LMP  (LMP Unknown)   SpO2 99%   BMI 36.66 kg/m      Wt Readings from Last 4 Encounters:   10/14/21 88 kg (194 lb)   10/08/21 91.6 kg (202 lb)   06/22/21 91.9 kg (202 lb 9.6 oz)   03/29/21 88.9 kg (196 lb) "       GENERAL: Alert, interactive, NAD  HEENT: Normocephalic, atraumatic, PERRL, EOMI, no conjunctivitis, anicteric sclera, tympanic membranes translucent with normal light reflex, no cervical LAD, no thyromegaly. Erythematous oropharynx without any exudates or cobblestoning.   LUNGS: clear to auscultation bilaterally, no crackles or wheezes albeit distant breath sounds; likely due to habitus.   HEART: regular rate and rhythm, normal S1 and S2, no murmur appreciated  ABDOMEN: Soft, nontender, nondistended. +BS, no HSM or masses, no rebound or guarding.  MUSCULOSKELETAL: no tenderness to spinous processes, no chest wall tenderness  EXTREMITIES: No LE edema bilaterally, 2+ posterior tibialis and radial pulses bialterally  SKIN: Warm and dry, no jaundice or acute rashes  NEURO: 4/5 strength in all 4 extremities, sensation intact, slow, antalgic gait, better with walker.  PSYCH: A&O to person, place and time. appropriate mood, normal speech, linear thought.            DATA:     Laboratory Data:  Pending   Imagin21 Xray of chest, care everwhere, no infiltrate or other acute findings.           ASSESSMENT & PLAN:     66 y.o. female with HTN, HLD, DM, GERD, Asthma, ADPKD s/p transplant on Immunosuppression w/ cyclosporin and mycophenolate presents with 2-months of worsening initially upper to lower respiratory symptoms(productive cough, dyspnea)  in setting of negative covid-19 tests, normal CXR with symptom onset coincident with travel in the [facility] concerning for possible endemic fungal infection/aspergillus infection/viral infection/triggered progression in asthma.     #Upper and Lower Respiratory Symptoms w/ documented negative Covid-19 PCR x2 at least.   # Chronic productive Cough and Dyspnea    # R/o Progression of Asthma and/or endemic fungal infection/Aspergilus or other viral infection     Given travel history, patient's immunosuppresion, appropriate to consider the endemic fungi  infection in her region of recent travel (blastomyces, histoplasma and cryptococcus) and in a known asthmatic, aspergillus. A respiratory viral panel will also be appropriate. Has been covid negative in the past 2 tests since these symptoms. Won't obtain covid-19 PCR today.  Appropriate to rule out progression of her asthma with pulmonary function tests; I suppose her asthma medical therapy will need optimization should this be the case so will await PFTs results.        - PFTs w/ DLCO. Optimize meds pending results; currently only on rescue albuterol.      - f/u results of aspergillus, cryptococcal, histoplasma and blastomyces antigens and treat as needed.      - CBC w/ diff to rule out any marked leukocytosis.       - consider chest imaging vs pulm referral pending PFT and above results if indicated.      - follow up in 5 weeks, a that time if ear symptoms still persist, refer to ENT to r/o eustachian tube dysfunction or other etiology.         #Healthcare Maintenance:    As per primary care physician     - uptodate on flu and covid-19 booster.     Return to clinic: 5 weeks with Dr. Cameron for follow up of lab results, PFTs and symptoms.     Patient care plan discussed with attending physician, Dr. Talbot,  who agreed with above.    Deisy Cameron MD  Internal Medicine, PGY-1  [phone]

## 2021-10-14 NOTE — TELEPHONE ENCOUNTER
omeprazole (PRILOSEC) 40 MG DR capsule  Take 1 capsule (40 mg) by mouth daily       Last Written Prescription Date:  4/12/21  Last Fill Quantity: 90,   # refills: 2  Last Office Visit : 10/14/21  Future Office visit:  none    Routing refill request to provider for review/approval because:    Failed protocol. Diagnosis of osteoporosis on record.  Pharmacy requests early refill.

## 2021-10-14 NOTE — PROGRESS NOTES
"I, Juan Talbot MD saw the patient with the resident, and agree with the resident's findings and plan of care as documented in the resident's note.  /81 (BP Location: Right arm, Patient Position: Sitting, Cuff Size: Adult Large)   Pulse 55   Ht 1.549 m (5' 1\")   Wt 88 kg (194 lb)   LMP  (LMP Unknown)   SpO2 99%   BMI 36.66 kg/m    I personally reviewed vital signs and past record.  Key findings: Immunosupression, KTXP, multiple chronic medical problems.  2 months of cough, increased dyspnea (needs daily albuterol, gives temporary relief), some additional symptoms after travel to [place] and [place].  Three times per week night sweats. Has had negative COVID testing.    "

## 2021-10-14 NOTE — PROGRESS NOTES
[facility] Dermatology Note  Encounter Date: Oct 14, 2021  Office Visit     Dermatology Problem List:  # Renal transplant 3/20/14, on cyclosporine and mycophenolate mofetil  ____________________________________________    Assessment & Plan:     # Seborrheic keratosis, non-irritated. Discussed the natural history and benign nature of this lesion. Reassurance provided that no additional treatment is necessary.     # Benign-appearing yellow papule on right perioral cheek. No concerning features clinically or on dermoscopy. Will continue to monitor at next visit     # Hx of transplant with resulting IMSP, currently on cyclosporine and cellcept. Discussed higher risk for cutaneous malignancy due to loss of normal immune surveillance while on these medications. Discussed health of the transplanted organ is paramount and comes before risk of skin cancers, which can be detected and managed if they develop.  - Photoprotection discussed (sunscreen, sun avoidance)  - Advised skin checks at regular yearly intervals. Will continue to monitor for changing or symptomatic lesions.      Procedures Performed:   None     Follow-up: 1 year(s) in-person, or earlier for new or changing lesions    Staff and Resident:     Bobbi Mooney MD PGY3    Patient was staffed with Dr. Bermudez     Patient was seen and examined with the dermatology resident. I agree with the history, review of systems, physical examination, assessments and plan.    Jory Bermudez MD  Professor and  Chair  Department of Dermatology  [facility]  ____________________________________________    CC: Derm Problem (Elizabeth is here for a skin check, said she has no new areas of concern.)    HPI:  Ms. Elizabeth Palma is a(n) 66 year old female who presents today as a return patient for FBSE. Present to clinic with  today who provides supplemental history. History of renal transplant in 2014, on cyclosporine and mycophenolate mofetil. She  denies any new lesions of concern. No bleeding, tender, or growing lesions. No significant sun exposure. Otherwise no concerns. Patient is otherwise feeling well, without additional skin concerns.    Labs Reviewed:  N/A    Physical Exam:  Vitals: LMP  (LMP Unknown)   SKIN: Total skin excluding the undergarment areas was performed. The exam included the head/face, neck, both arms, chest, back, abdomen, both legs, digits and/or nails.   - There is a yellow globular appearing papule on the right side of face near oral commissure   - Multiple regular brown pigmented macules and papules are identified on the back and arms.   - There are few waxy stuck on tan to brown papules on the back.   - There is a healing scar on the left lower extremity   - No other lesions of concern on areas examined.     Medications:  Current Outpatient Medications   Medication     acetaminophen (TYLENOL) 500 MG tablet     ACETYLCYSTEINE PO     albuterol (PROAIR HFA/PROVENTIL HFA/VENTOLIN HFA) 108 (90 Base) MCG/ACT inhaler     ARIPiprazole (ABILIFY) 2 MG tablet     aspirin EC 81 MG EC tablet     blood glucose monitoring (ACCU-CHEK RALPH PLUS) meter device kit     blood glucose monitoring (NO BRAND SPECIFIED) meter device kit     blood glucose monitoring (NO BRAND SPECIFIED) test strip     blood glucose monitoring (SOFTCLIX) lancets     Cholecalciferol (VITAMIN D) 1000 UNITS capsule     cyanocolbalamin (VITAMIN  B-12) 1000 MCG tablet     cycloSPORINE modified (GENERIC EQUIVALENT) 25 MG capsule     estradiol (VAGIFEM) 10 MCG TABS vaginal tablet     ferrous sulfate (FEROSUL) 325 (65 Fe) MG tablet     fluticasone (FLONASE) 50 MCG/ACT nasal spray     furosemide (LASIX) 40 MG tablet     gabapentin (NEURONTIN) 300 MG capsule     latanoprost (XALATAN) 0.005 % ophthalmic solution     metFORMIN (GLUCOPHAGE-XR) 500 MG 24 hr tablet     metolazone (ZAROXOLYN) 5 MG tablet     mycophenolic acid (GENERIC EQUIVALENT) 180 MG EC tablet     nystatin (MYCOSTATIN)  [id number] UNIT/GM external cream     omeprazole (PRILOSEC) 40 MG DR capsule     ondansetron (ZOFRAN-ODT) 4 MG ODT tab     order for DME     order for DME     Polyvinyl Alcohol-Povidone (REFRESH OP)     prazosin (MINIPRESS) 2 MG capsule     prazosin (MINIPRESS) 2 MG capsule     prazosin (MINIPRESS) 5 MG capsule     simvastatin (ZOCOR) 20 MG tablet     sulfamethoxazole-trimethoprim (BACTRIM) 400-80 MG tablet     topiramate (TOPAMAX) 200 MG tablet     Vaginal Lubricant (REPLENS) GEL     vilazodone (VIIBRYD) 40 MG TABS tablet     No current facility-administered medications for this visit.      Past Medical History:   Patient Active Problem List   Diagnosis     Hypertension     Hyperlipidemia     Abnormal MRI, cervical spine     Sensory loss     Premature menopause age 37     OP (osteoporosis) T score -3.8     Major depressive disorder, recurrent episode, moderate (H)     Generalized anxiety disorder     CMC DJD(carpometacarpal degenerative joint disease), localized primary     Pain in joint, forearm -- L unhealed Fx     -donor kidney transplant     Immunosuppressed status (H)     Hyperparathyroidism, secondary (H)     Hyperparathyroidism (H)     Senile osteoporosis     Pain in joint involving ankle and foot     Nightmares associated with chronic post-traumatic stress disorder     DM type 2 with Neuropathy, Hgb A1C 7.3 on 21     Posttraumatic stress disorder     Plantar fasciitis, bilateral     Right knee pain     Age-related osteoporosis without current pathological fracture     Narcissistic personality disorder (H)     Personal history of other drug therapy     Median sensory neuropathy, left     Marital conflict     Suicidal ideation     Autosomal dominant polycystic kidney disease     Secondary hyperparathyroidism (H)     Anemia, iron deficiency     Morbid obesity (H)     Chronic kidney disease, stage 3 (H)     PCKD, S/P Renal Transplant  ([facility])     Intractable back pain     UTI     Bacteremia      Acute diastolic congestive heart failure (H)     Past Medical History:   Diagnosis Date     Abnormal MRI, cervical spine 10/15/2011    2011; mild changes noted. Study done for left arm symptoms Impression:  1. Mild multilevel degenerative disc disease with no significant canal or neural stenosis seen. motion artifact on the STIR images in these are not interpretable. The remaining images were interpreted      Autosomal dominant polycystic kidney disease 2011     (Problem list name updated by automated process. Provider to review and confirm.)     CMC DJD(carpometacarpal degenerative joint disease), localized primary 2013     -donor kidney transplant 2014     Gastroesophageal reflux disease      Generalized anxiety disorder 11/15/2012     Glaucoma      Hyperlipidemia 10/15/2011     Hyperparathyroidism, secondary (H) 2015     Hypertension     resolved     Immunosuppressed status (H) 2014     Major depressive disorder, recurrent episode, moderate (H) 11/15/2012     Obesity (BMI 30-39.9)      OP (osteoporosis) T score -3.8 2009 T-score -3.7      LION (obstructive sleep apnoea) 10/15/2012    reported intolerant to CPAP -- she says she doesn't have LION     Pain in joint, forearm -- L unhealed Fx 2013     Premature menopause age 37 07/10/2012    OCP (vaginal bldg)-->HT which she stopped 2 mo later documented at 2007 visit (age 51).      Restless leg syndrome      Stiffness of joint, not elsewhere classified, hand 2013     Tremor 10/15/2011    head     Type 2 DM with Neuropathy 1985    started with gestational diabetes     Uncomplicated asthma        CC Horacio Sheridan MD  [address] [facility] [address]  [place] [address] on close of this encounter.

## 2021-10-14 NOTE — NURSING NOTE
Elizabeth Palma is a 66 year old female patient that presents today in clinic for the following:    Chief Complaint   Patient presents with     RECHECK     Discuss ENT referral     The patient's allergies and medications were reviewed as noted. A set of vitals were recorded as noted without incident. The patient does not have any other questions for the provider.    Fallon Lan, EMT at 8:01 AM on 10/14/2021

## 2021-10-15 LAB
MYCOPHENOLATE SERPL LC/MS/MS-MCNC: 1.38 MG/L (ref 1–3.5)
MYCOPHENOLATE-G SERPL LC/MS/MS-MCNC: 97.6 MG/L (ref 30–95)
TME LAST DOSE: ABNORMAL H
TME LAST DOSE: ABNORMAL H

## 2021-10-15 ASSESSMENT — ASTHMA QUESTIONNAIRES: ACT_TOTALSCORE: 11

## 2021-10-16 LAB
GALACTOMANNAN AG SERPL QL IA: NEGATIVE
GALACTOMANNAN AG SPEC IA-ACNC: 0.06

## 2021-10-18 LAB
SCANNED LAB RESULT: NORMAL
SCANNED LAB RESULT: NORMAL

## 2021-10-22 DIAGNOSIS — R05.9 COUGH: ICD-10-CM

## 2021-10-22 DIAGNOSIS — J45.40 MODERATE PERSISTENT ASTHMA, UNSPECIFIED WHETHER COMPLICATED: ICD-10-CM

## 2021-10-22 PROCEDURE — 94375 RESPIRATORY FLOW VOLUME LOOP: CPT | Performed by: INTERNAL MEDICINE

## 2021-10-22 PROCEDURE — 94729 DIFFUSING CAPACITY: CPT | Performed by: INTERNAL MEDICINE

## 2021-10-22 PROCEDURE — 94726 PLETHYSMOGRAPHY LUNG VOLUMES: CPT | Performed by: INTERNAL MEDICINE

## 2021-10-25 LAB
DLCOCOR-%PRED-PRE: 72 %
DLCOCOR-PRE: 13.01 ML/MIN/MMHG
DLCOUNC-%PRED-PRE: 72 %
DLCOUNC-PRE: 12.97 ML/MIN/MMHG
DLCOUNC-PRED: 17.94 ML/MIN/MMHG
ERV-%PRED-PRE: 144 %
ERV-PRE: 0.39 L
ERV-PRED: 0.27 L
EXPTIME-PRE: 7.05 SEC
FEF2575-%PRED-PRE: 70 %
FEF2575-PRE: 1.38 L/SEC
FEF2575-PRED: 1.97 L/SEC
FEFMAX-%PRED-PRE: 63 %
FEFMAX-PRE: 3.59 L/SEC
FEFMAX-PRED: 5.61 L/SEC
FEV1-%PRED-PRE: 84 %
FEV1-PRE: 1.81 L
FEV1FEV6-PRE: 75 %
FEV1FEV6-PRED: 80 %
FEV1FVC-PRE: 77 %
FEV1FVC-PRED: 79 %
FEV1SVC-PRE: 90 %
FEV1SVC-PRED: 77 %
FIFMAX-PRE: 2.46 L/SEC
FRCPLETH-%PRED-PRE: 73 %
FRCPLETH-PRE: 1.86 L
FRCPLETH-PRED: 2.53 L
FVC-%PRED-PRE: 85 %
FVC-PRE: 2.34 L
FVC-PRED: 2.73 L
IC-%PRED-PRE: 64 %
IC-PRE: 1.63 L
IC-PRED: 2.54 L
RVPLETH-%PRED-PRE: 80 %
RVPLETH-PRE: 1.47 L
RVPLETH-PRED: 1.83 L
TLCPLETH-%PRED-PRE: 78 %
TLCPLETH-PRE: 3.5 L
TLCPLETH-PRED: 4.44 L
VA-%PRED-PRE: 72 %
VA-PRE: 3.03 L
VC-%PRED-PRE: 72 %
VC-PRE: 2.02 L
VC-PRED: 2.81 L

## 2021-10-28 ENCOUNTER — VIRTUAL VISIT (OUTPATIENT)
Dept: INTERNAL MEDICINE | Facility: CLINIC | Age: 66
End: 2021-10-28
Payer: MEDICARE

## 2021-10-28 DIAGNOSIS — R05.9 COUGH: ICD-10-CM

## 2021-10-28 DIAGNOSIS — J45.990 EXERCISE-INDUCED ASTHMA: ICD-10-CM

## 2021-10-28 DIAGNOSIS — H69.93 DYSFUNCTION OF BOTH EUSTACHIAN TUBES: Primary | ICD-10-CM

## 2021-10-28 DIAGNOSIS — E66.01 MORBID OBESITY DUE TO EXCESS CALORIES (H): ICD-10-CM

## 2021-10-28 PROCEDURE — 99214 OFFICE O/P EST MOD 30 MIN: CPT | Mod: 95 | Performed by: INTERNAL MEDICINE

## 2021-10-28 RX ORDER — DEXTROMETHORPHAN POLISTIREX 30 MG/5ML
60 SUSPENSION ORAL 2 TIMES DAILY
Qty: 148 ML | Refills: 1 | Status: SHIPPED | OUTPATIENT
Start: 2021-10-28 | End: 2022-04-26

## 2021-10-28 RX ORDER — GUAIFENESIN, PSEUDOEPHEDRINE HYDROCHLORIDE 600; 60 MG/1; MG/1
1 TABLET, EXTENDED RELEASE ORAL EVERY 12 HOURS
Qty: 28 TABLET | Refills: 1 | Status: SHIPPED | OUTPATIENT
Start: 2021-10-28 | End: 2022-04-26

## 2021-10-28 RX ORDER — ALBUTEROL SULFATE 90 UG/1
2 AEROSOL, METERED RESPIRATORY (INHALATION) EVERY 6 HOURS PRN
Qty: 8.5 G | Refills: 3 | Status: SHIPPED | OUTPATIENT
Start: 2021-10-28 | End: 2022-04-26

## 2021-10-28 NOTE — PROGRESS NOTES
SUBJECTIVE/OBJECTIVE:    Elizabeth Palma is a 66 year old female who presents to clinic today for the following health issues:    History:   She is not breathing well. She will wake up coughing and is coughing a lot. She has to use her rescue inhaler and this is helpful - when she uses it she will breath easier and has less cough.This has been going on 2 months - she says it is exactly the same as 2 months ago. Ears seem plugged (can't hear L>R).  She had her ears cleaned out. COVID has been negative . She has tried antibiotics - she had no relief with doxycycline (did not change) and Vicks vapo rub.  She wants to see ENT but was told she cannot go.    PFTs on 10/22 was normal  BPs have been 130s/80s    Precharting: She was seen 10/14 for wheezing, cough with sputum, sinus pressure and ear fullness.  She has had to use her albuterol inhaler more.  She has tested negative for COVID x 2.  She was recommended to get PFTs, fungal infection labs, and to wait 5 weeks to see if better.  CBC normal, blasto/histo/aspergillus all neg.  CXR on 9/22 was normal.    The following have been reviewed:  Medication list  Allergies  Past medical/surgical history  Family history    ASSESSMENT/PLAN:   I suspect that this is eustachian tube dysfunction and perhaps some post nasal drip given the symptoms worse after lying down.  I did explain how challenging it is to diagnose via a virtual visit and that all I can rely upon is her history.  It is feasible this could be asthma without audible wheezing to her (normal PFTs also) or infection which would be hard to tell without exam.  I suggested the following empiric treatment.   - Mucinex-D - we both acknowledge that this increases BP but that is fine for a short period   - Cough - Delsym for symptom relief   - Afrin as needed for symptoms relief and decongestion   - ENT referral for further exam should this not get better    Number and complexity of problems:  1 undiagnosed new  problem    Amount and Complexity of Data Reviewed/Analyzed:  1 External notes reviewed  1 Unique test reviewed    Risk of complication/morbidity of patient management:  Patient receiving prescription management    Video visit start time: 12:06p  Video visit end time: 12:31p  Bad audio so I called her on the phone while doing video via Amwell    Originating Location (pt. Location): [facility]    Distant Location (provider location):  [facility] [place]     Mode of Communication:  Video via Amwell and Telephone via Knowable  Horacio Sheridan MD, FACP

## 2021-10-29 DIAGNOSIS — E11.42 TYPE 2 DIABETES MELLITUS WITH DIABETIC POLYNEUROPATHY, WITHOUT LONG-TERM CURRENT USE OF INSULIN (H): ICD-10-CM

## 2021-11-01 RX ORDER — METFORMIN HCL 500 MG
1500 TABLET, EXTENDED RELEASE 24 HR ORAL
Qty: 270 TABLET | Refills: 1 | Status: SHIPPED | OUTPATIENT
Start: 2021-11-01 | End: 2022-06-27

## 2021-11-01 NOTE — TELEPHONE ENCOUNTER
FUTURE VISIT INFORMATION      FUTURE VISIT INFORMATION:    Date: 1/6/22    Time: 1:30 PM; Audio 12:30 PM    Location: [facility]  REFERRAL INFORMATION:    Referring provider: Dr. Horacio Sheridan    Referring providers clinic: [facility]    Reason for visit/diagnosis: ETD, cough    RECORDS REQUESTED FROM:       Clinic name Comments Records Status Imaging Status   [facility] ([facility]) 11/18/21, 10/14/21 - PCC OV with Dr. Cameron  10/28/21 - PCC VV with Dr. Sheridan  10/8/21 - PCC OV with Ana Tan NP  2/13/19 - ENT OV with Dr. Anaya [facility]    [facility] - Procedure 10/22/21 - PFT  [facility] CT Head  4/22/19 - MRI Brain [facility] PACs   [facility] 4/20/21 - UC OV with MEGAN Buchanan Care Everywhere    [facility] 4/6/19 - ED OV with Dr. Ulloa Care Everywhere    [facility] - Imaging 4/6/19 - CT Head Care Everywhere 11/1 Req - In PACs           * 11/1/21 2:08 PM Faxed req to [facility] for images to be pushed to [place] [facility]s. - Nayana

## 2021-11-02 RX ORDER — ALBUTEROL SULFATE 90 UG/1
AEROSOL, METERED RESPIRATORY (INHALATION)
Qty: 25.5 G | OUTPATIENT
Start: 2021-11-02

## 2021-11-02 RX ORDER — TOPIRAMATE 200 MG/1
200 TABLET, FILM COATED ORAL 2 TIMES DAILY
Qty: 60 TABLET | Refills: 0 | Status: SHIPPED | OUTPATIENT
Start: 2021-11-02 | End: 2021-11-22

## 2021-11-02 NOTE — TELEPHONE ENCOUNTER
TOPIRAMATE 200MG TABLETS  Last Written Prescription Date:  4/7/2021  Last Fill Quantity: 180,   # refills: 1  Last Office Visit :  3/1/2021  Future Office visit:  11/22/2021  30 day supply to cover Pt until visit at the end of November 2021.       Khushboo Emerson RN  Central Triage Red Flags/Med Refills

## 2021-11-03 ENCOUNTER — TELEPHONE (OUTPATIENT)
Dept: TRANSPLANT | Facility: CLINIC | Age: 66
End: 2021-11-03

## 2021-11-03 DIAGNOSIS — E66.01 MORBID OBESITY DUE TO EXCESS CALORIES (H): ICD-10-CM

## 2021-11-03 NOTE — TELEPHONE ENCOUNTER
Elizabeth's , Rahat, called to report he and Elizabeth had lunch yesterday with a woman whose sister tested positive for Covid yesterday. The woman had seen her sister for about 10 minutes prior to having lunch with Rahat and Elizabeth.  Rahat and Elizabeth are both asymptomatic.   The woman they saw is getting tested tomorrow or Friday.   I let them know they are not technically considered high risk exposure.  They should stay in contact with the woman they saw. If she tests positive they should get tested 3-5 days after their contact with her.  If they begin having symptoms they should get tested and update SOT if Elizabeth tests positive.  Let them know they do not need a doctors order for a Covid swab. They should just make an appointment wherever is convenient to them.

## 2021-11-05 DIAGNOSIS — E66.01 MORBID OBESITY DUE TO EXCESS CALORIES (H): ICD-10-CM

## 2021-11-08 RX ORDER — TOPIRAMATE 200 MG/1
TABLET, FILM COATED ORAL
Qty: 180 TABLET | OUTPATIENT
Start: 2021-11-08

## 2021-11-08 NOTE — TELEPHONE ENCOUNTER
TOPIRAMATE 200MG TABLETS   Disp Refills Start End GIULIA   topiramate (TOPAMAX) 200 MG tablet 60 tablet 0 11/2/2021  No   Sig - Route: Take 1 tablet (200 mg) by mouth 2 times daily (Please keep visit for 11/22/2021 for further refills) Thank you - Oral   Sent to pharmacy as: Topiramate 200 MG Oral Tablet (TOPAMAX)   Class: E-Prescribe   Order: [id number]   E-Prescribing Status: Receipt confirmed by pharmacy (11/2/2021  1:17 PM CDT)   Prior authorization: Closed - Prior Authorization not required for patient/medication       Printout Tracking    External Result Report     Medication Administration Instructions    (Please keep visit for 11/22/2021 for further refills) Thank you     Pharmacy    [id number] - [place], [place] - [address] AT [facility] [address]

## 2021-11-09 ENCOUNTER — OFFICE VISIT (OUTPATIENT)
Dept: PODIATRY | Facility: CLINIC | Age: 66
End: 2021-11-09
Payer: MEDICARE

## 2021-11-09 VITALS — OXYGEN SATURATION: 99 % | SYSTOLIC BLOOD PRESSURE: 134 MMHG | DIASTOLIC BLOOD PRESSURE: 84 MMHG | HEART RATE: 59 BPM

## 2021-11-09 DIAGNOSIS — E11.49 TYPE II OR UNSPECIFIED TYPE DIABETES MELLITUS WITH NEUROLOGICAL MANIFESTATIONS, NOT STATED AS UNCONTROLLED(250.60) (H): Primary | ICD-10-CM

## 2021-11-09 DIAGNOSIS — M21.969 TYPE 2 DIABETES MELLITUS WITH DIABETIC FOOT DEFORMITY (H): ICD-10-CM

## 2021-11-09 DIAGNOSIS — L60.2 ONYCHAUXIS: ICD-10-CM

## 2021-11-09 DIAGNOSIS — E11.69 TYPE 2 DIABETES MELLITUS WITH DIABETIC FOOT DEFORMITY (H): ICD-10-CM

## 2021-11-09 PROCEDURE — 99214 OFFICE O/P EST MOD 30 MIN: CPT | Mod: 25 | Performed by: PODIATRIST

## 2021-11-09 PROCEDURE — 11719 TRIM NAIL(S) ANY NUMBER: CPT | Mod: Q8 | Performed by: PODIATRIST

## 2021-11-09 ASSESSMENT — PAIN SCALES - GENERAL: PAINLEVEL: NO PAIN (0)

## 2021-11-09 NOTE — LETTER
2021         RE: Elizabeth Palma  [address]  [address]  [facility] [address]        Dear Colleague,    Thank you for referring your patient, Elizabeth Palma, to the [facility]. Please see a copy of my visit note below.    Past Medical History:   Diagnosis Date     Abnormal MRI, cervical spine 10/15/2011    2011; mild changes noted. Study done for left arm symptoms Impression:  1. Mild multilevel degenerative disc disease with no significant canal or neural stenosis seen. motion artifact on the STIR images in these are not interpretable. The remaining images were interpreted      Autosomal dominant polycystic kidney disease 2011     (Problem list name updated by automated process. Provider to review and confirm.)     CMC DJD(carpometacarpal degenerative joint disease), localized primary 2013     -donor kidney transplant 2014     Gastroesophageal reflux disease      Generalized anxiety disorder 11/15/2012     Glaucoma      Hyperlipidemia 10/15/2011     Hyperparathyroidism, secondary (H) 2015     Hypertension     resolved     Immunosuppressed status (H) 2014     Major depressive disorder, recurrent episode, moderate (H) 11/15/2012     Obesity (BMI 30-39.9)      OP (osteoporosis) T score -3.8 2009 T-score -3.7      LION (obstructive sleep apnoea) 10/15/2012    reported intolerant to CPAP -- she says she doesn't have LION     Pain in joint, forearm -- L unhealed Fx 2013     Premature menopause age 37 07/10/2012    OCP (vaginal bldg)-->HT which she stopped 2 mo later documented at 2007 visit (age 51).      Restless leg syndrome      Stiffness of joint, not elsewhere classified, hand 2013     Tremor 10/15/2011    head     Type 2 DM with Neuropathy 1985    started with gestational diabetes     Uncomplicated asthma      Patient Active Problem List   Diagnosis     Hypertension     Hyperlipidemia     Abnormal  MRI, cervical spine     Sensory loss     Premature menopause age 37     OP (osteoporosis) T score -3.8     Major depressive disorder, recurrent episode, moderate (H)     Generalized anxiety disorder     CMC DJD(carpometacarpal degenerative joint disease), localized primary     Pain in joint, forearm -- L unhealed Fx     -donor kidney transplant     Immunosuppressed status (H)     Hyperparathyroidism, secondary (H)     Hyperparathyroidism (H)     Senile osteoporosis     Pain in joint involving ankle and foot     Nightmares associated with chronic post-traumatic stress disorder     DM type 2 with Neuropathy, Hgb A1C 7.3 on 21     Posttraumatic stress disorder     Plantar fasciitis, bilateral     Right knee pain     Age-related osteoporosis without current pathological fracture     Narcissistic personality disorder (H)     Personal history of other drug therapy     Median sensory neuropathy, left     Marital conflict     Suicidal ideation     Autosomal dominant polycystic kidney disease     Secondary hyperparathyroidism (H)     Anemia, iron deficiency     Morbid obesity (H)     Chronic kidney disease, stage 3 (H)     PCKD, S/P Renal Transplant  ([facility])     Intractable back pain     UTI     Bacteremia     Acute diastolic congestive heart failure (H)     Past Surgical History:   Procedure Laterality Date     ABDOMEN SURGERY       ANKLE SURGERY       C TRANSPLANTATION OF KIDNEY  2014     C/SECTION, LOW TRANSVERSE      x 2     CHOLECYSTECTOMY       COLONOSCOPY       ESOPHAGOSCOPY, GASTROSCOPY, DUODENOSCOPY (EGD), COMBINED N/A 2015    Procedure: COMBINED ESOPHAGOSCOPY, GASTROSCOPY, DUODENOSCOPY (EGD);  Surgeon: Sky Davey MD;  Location:  GI     ESOPHAGOSCOPY, GASTROSCOPY, DUODENOSCOPY (EGD), COMBINED N/A 2015    Procedure: COMBINED ESOPHAGOSCOPY, GASTROSCOPY, DUODENOSCOPY (EGD), BIOPSY SINGLE OR MULTIPLE;  Surgeon: Sky Davey MD;  Location:  GI     EYE SURGERY        LAPAROSCOPY, SURGICAL; REPAIR INCISIONAL OR VENTRAL HERNIA       LASER SURGERY OF EYE Left 10/01/2020    sever vitreous strands     ORTHOPEDIC SURGERY       HERMINIA EN Y BOWEL  1990     WRIST SURGERY       Social History     Socioeconomic History     Marital status:      Spouse name: Rahat     Number of children: 2     Years of education: Not on file     Highest education level: Not on file   Occupational History     Comment: part-time   Tobacco Use     Smoking status: Never Smoker     Smokeless tobacco: Never Used   Substance and Sexual Activity     Alcohol use: No     Alcohol/week: 0.0 standard drinks     Drug use: No     Sexual activity: Yes     Partners: Male     Birth control/protection: None     Comment: 1 partner   Other Topics Concern     Parent/sibling w/ CABG, MI or angioplasty before 65F 55M? 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)      Social Determinants of Health     Financial Resource Strain: Not on file   Food Insecurity: Not on file   Transportation Needs: Not on file   Physical Activity: Not on file   Stress: Not on file   Social Connections: Not on file   Intimate Partner Violence: Not on file   Housing Stability: Not on file     Family History   Problem Relation Age of Onset     Mental Illness Other         family hx     Heart Disease Other      Diabetes Other      Cancer Other      Genetic Disorder Father      Mental Illness Father      Diabetes Father      Hypertension Father      Hyperlipidemia Mother      Diabetes Mother      Hypertension Mother      Mental Illness Other      Diabetes Other      Glaucoma No family hx of      Macular Degeneration No family hx of      Lab Results   Component Value Date    A1C 6.9 06/19/2021    A1C 7.3 01/25/2021    A1C 6.7 09/03/2020    A1C 7.1 12/17/2019    A1C 7.2 04/24/2019     Lab Results   Component Value Date    WBC 4.8 10/14/2021    WBC 7.1 06/29/2021     Lab Results   Component  Value Date    RBC 4.57 10/14/2021    RBC 4.71 06/29/2021     Lab Results   Component Value Date    HGB 13.3 10/14/2021    HGB 13.9 06/29/2021     Lab Results   Component Value Date    HCT 40.9 10/14/2021    HCT 42.0 06/29/2021     No components found for: MCT  Lab Results   Component Value Date    MCV 90 10/14/2021    MCV 89 06/29/2021     Lab Results   Component Value Date    MCH 29.1 10/14/2021    MCH 29.5 06/29/2021     Lab Results   Component Value Date    MCHC 32.5 10/14/2021    MCHC 33.1 06/29/2021     Lab Results   Component Value Date    RDW 13.7 10/14/2021    RDW 14.9 06/29/2021     Lab Results   Component Value Date     10/14/2021     06/29/2021     Last Comprehensive Metabolic Panel:  Sodium   Date Value Ref Range Status   10/14/2021 140 133 - 144 mmol/L Final   06/29/2021 139 133 - 144 mmol/L Final     Potassium   Date Value Ref Range Status   10/14/2021 3.9 3.4 - 5.3 mmol/L Final   06/29/2021 4.9 3.4 - 5.3 mmol/L Final     Chloride   Date Value Ref Range Status   10/14/2021 111 (H) 94 - 109 mmol/L Final   06/29/2021 109 94 - 109 mmol/L Final     Carbon Dioxide   Date Value Ref Range Status   06/29/2021 24 20 - 32 mmol/L Final     Carbon Dioxide (CO2)   Date Value Ref Range Status   10/14/2021 24 20 - 32 mmol/L Final     Anion Gap   Date Value Ref Range Status   10/14/2021 5 3 - 14 mmol/L Final   06/29/2021 6 3 - 14 mmol/L Final     Glucose   Date Value Ref Range Status   10/14/2021 152 (H) 70 - 99 mg/dL Final   06/29/2021 162 (H) 70 - 99 mg/dL Final     Urea Nitrogen   Date Value Ref Range Status   10/14/2021 13 7 - 30 mg/dL Final   06/29/2021 13 7 - 30 mg/dL Final     Creatinine   Date Value Ref Range Status   10/14/2021 1.03 0.52 - 1.04 mg/dL Final   06/29/2021 1.08 (H) 0.52 - 1.04 mg/dL Final     GFR Estimate   Date Value Ref Range Status   10/14/2021 58 (L) >60 mL/min/1.73m2 Final     Comment:     As of July 11, 2021, eGFR is calculated by the CKD-EPI creatinine equation, without race  adjustment. eGFR can be influenced by muscle mass, exercise, and diet. The reported eGFR is an estimation only and is only applicable if the renal function is stable.   06/29/2021 54 (L) >60 mL/min/[1.73_m2] Final     Comment:     Non  GFR Calc  Starting 12/18/2018, serum creatinine based estimated GFR (eGFR) will be   calculated using the Chronic Kidney Disease Epidemiology Collaboration   (CKD-EPI) equation.       Calcium   Date Value Ref Range Status   10/14/2021 9.1 8.5 - 10.1 mg/dL Final   06/29/2021 10.8 (H) 8.5 - 10.1 mg/dL Final     SUBJECTIVE FINDINGS:  This 66-year-old female returns to clinic for diabetic foot check and onychauxis, onychocryptosis. She relates to wearing her Diabetic shoes. Relates no ulcers or sores since we have seen her last, no injuries. She relates she does have some numbness and tingling in her feet that is unchanged.  Relates she had a pedicure for a wedding and her toenails have been painful since.     OBJECTIVE FINDINGS:  DP and PT are 2/4 bilaterally. She has decreased ankle joint dorsiflexion bilaterally. She has dorsally contracted digits bilaterally 1-5. She has flexible flat foot with dorsal medial first MPJ prominence. There is no erythema, no drainage, no odor, no calor bilaterally. She has incurvated nails 1-5 bilaterally to differing degrees with pain on palpation of toenails bilaterally.  She has some thickening, dystrophy and subungual debris of toenails bilaterally.      ASSESSMENT AND PLAN: Onychauxis and Onychomycosis bilaterally causing pain. She is diabetic with peripheral neuropathy and has Hammertoes present. Diagnosis and treatment discussed with her. All the nails were reduced or debrided bilaterally upon consent. Hallux nail borders bled upon debridement and local wound cares with Bacitracin and bandaid done upon consent and use discussed with her.  Continue Diabetic shoes and custom orthotics.  Previous notes reviewed.  Return to clinic to  see me in about 2-3 months.   Moderate level of medical decision making with Number and Complexity of  Problems Addressed- moderate and Risk of Complications and/or  Morbidity or Mortality of  Patient Management- moderate.      Again, thank you for allowing me to participate in the care of your patient.        Sincerely,        Javier Tuttle DPM

## 2021-11-09 NOTE — PROGRESS NOTES
Past Medical History:   Diagnosis Date     Abnormal MRI, cervical spine 10/15/2011    2011; mild changes noted. Study done for left arm symptoms Impression:  1. Mild multilevel degenerative disc disease with no significant canal or neural stenosis seen. motion artifact on the STIR images in these are not interpretable. The remaining images were interpreted      Autosomal dominant polycystic kidney disease 2011     (Problem list name updated by automated process. Provider to review and confirm.)     CMC DJD(carpometacarpal degenerative joint disease), localized primary 2013     -donor kidney transplant 2014     Gastroesophageal reflux disease      Generalized anxiety disorder 11/15/2012     Glaucoma      Hyperlipidemia 10/15/2011     Hyperparathyroidism, secondary (H) 2015     Hypertension     resolved     Immunosuppressed status (H) 2014     Major depressive disorder, recurrent episode, moderate (H) 11/15/2012     Obesity (BMI 30-39.9)      OP (osteoporosis) T score -3.8 2009 T-score -3.7      LION (obstructive sleep apnoea) 10/15/2012    reported intolerant to CPAP -- she says she doesn't have LION     Pain in joint, forearm -- L unhealed Fx 2013     Premature menopause age 37 07/10/2012    OCP (vaginal bldg)-->HT which she stopped 2 mo later documented at 2007 visit (age 51).      Restless leg syndrome      Stiffness of joint, not elsewhere classified, hand 2013     Tremor 10/15/2011    head     Type 2 DM with Neuropathy 1985    started with gestational diabetes     Uncomplicated asthma      Patient Active Problem List   Diagnosis     Hypertension     Hyperlipidemia     Abnormal MRI, cervical spine     Sensory loss     Premature menopause age 37     OP (osteoporosis) T score -3.8     Major depressive disorder, recurrent episode, moderate (H)     Generalized anxiety disorder     CMC DJD(carpometacarpal degenerative joint disease), localized  primary     Pain in joint, forearm -- L unhealed Fx     -donor kidney transplant     Immunosuppressed status (H)     Hyperparathyroidism, secondary (H)     Hyperparathyroidism (H)     Senile osteoporosis     Pain in joint involving ankle and foot     Nightmares associated with chronic post-traumatic stress disorder     DM type 2 with Neuropathy, Hgb A1C 7.3 on 21     Posttraumatic stress disorder     Plantar fasciitis, bilateral     Right knee pain     Age-related osteoporosis without current pathological fracture     Narcissistic personality disorder (H)     Personal history of other drug therapy     Median sensory neuropathy, left     Marital conflict     Suicidal ideation     Autosomal dominant polycystic kidney disease     Secondary hyperparathyroidism (H)     Anemia, iron deficiency     Morbid obesity (H)     Chronic kidney disease, stage 3 (H)     PCKD, S/P Renal Transplant  ([facility])     Intractable back pain     UTI     Bacteremia     Acute diastolic congestive heart failure (H)     Past Surgical History:   Procedure Laterality Date     ABDOMEN SURGERY       ANKLE SURGERY       C TRANSPLANTATION OF KIDNEY  2014     C/SECTION, LOW TRANSVERSE      x 2     CHOLECYSTECTOMY       COLONOSCOPY       ESOPHAGOSCOPY, GASTROSCOPY, DUODENOSCOPY (EGD), COMBINED N/A 2015    Procedure: COMBINED ESOPHAGOSCOPY, GASTROSCOPY, DUODENOSCOPY (EGD);  Surgeon: Sky Davey MD;  Location:  GI     ESOPHAGOSCOPY, GASTROSCOPY, DUODENOSCOPY (EGD), COMBINED N/A 2015    Procedure: COMBINED ESOPHAGOSCOPY, GASTROSCOPY, DUODENOSCOPY (EGD), BIOPSY SINGLE OR MULTIPLE;  Surgeon: Sky Davey MD;  Location:  GI     EYE SURGERY       LAPAROSCOPY, SURGICAL; REPAIR INCISIONAL OR VENTRAL HERNIA       LASER SURGERY OF EYE Left 10/01/2020    sever vitreous strands     ORTHOPEDIC SURGERY       HERMINIA EN Y BOWEL       WRIST SURGERY       Social History     Socioeconomic History     Marital  status:      Spouse name: Rahat     Number of children: 2     Years of education: Not on file     Highest education level: Not on file   Occupational History     Comment: part-time   Tobacco Use     Smoking status: Never Smoker     Smokeless tobacco: Never Used   Substance and Sexual Activity     Alcohol use: No     Alcohol/week: 0.0 standard drinks     Drug use: No     Sexual activity: Yes     Partners: Male     Birth control/protection: None     Comment: 1 partner   Other Topics Concern     Parent/sibling w/ CABG, MI or angioplasty before 65F 55M? 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)      Social Determinants of Health     Financial Resource Strain: Not on file   Food Insecurity: Not on file   Transportation Needs: Not on file   Physical Activity: Not on file   Stress: Not on file   Social Connections: Not on file   Intimate Partner Violence: Not on file   Housing Stability: Not on file     Family History   Problem Relation Age of Onset     Mental Illness Other         family hx     Heart Disease Other      Diabetes Other      Cancer Other      Genetic Disorder Father      Mental Illness Father      Diabetes Father      Hypertension Father      Hyperlipidemia Mother      Diabetes Mother      Hypertension Mother      Mental Illness Other      Diabetes Other      Glaucoma No family hx of      Macular Degeneration No family hx of      Lab Results   Component Value Date    A1C 6.9 06/19/2021    A1C 7.3 01/25/2021    A1C 6.7 09/03/2020    A1C 7.1 12/17/2019    A1C 7.2 04/24/2019     Lab Results   Component Value Date    WBC 4.8 10/14/2021    WBC 7.1 06/29/2021     Lab Results   Component Value Date    RBC 4.57 10/14/2021    RBC 4.71 06/29/2021     Lab Results   Component Value Date    HGB 13.3 10/14/2021    HGB 13.9 06/29/2021     Lab Results   Component Value Date    HCT 40.9 10/14/2021    HCT 42.0 06/29/2021     No components found for:  MCT  Lab Results   Component Value Date    MCV 90 10/14/2021    MCV 89 06/29/2021     Lab Results   Component Value Date    MCH 29.1 10/14/2021    MCH 29.5 06/29/2021     Lab Results   Component Value Date    MCHC 32.5 10/14/2021    MCHC 33.1 06/29/2021     Lab Results   Component Value Date    RDW 13.7 10/14/2021    RDW 14.9 06/29/2021     Lab Results   Component Value Date     10/14/2021     06/29/2021     Last Comprehensive Metabolic Panel:  Sodium   Date Value Ref Range Status   10/14/2021 140 133 - 144 mmol/L Final   06/29/2021 139 133 - 144 mmol/L Final     Potassium   Date Value Ref Range Status   10/14/2021 3.9 3.4 - 5.3 mmol/L Final   06/29/2021 4.9 3.4 - 5.3 mmol/L Final     Chloride   Date Value Ref Range Status   10/14/2021 111 (H) 94 - 109 mmol/L Final   06/29/2021 109 94 - 109 mmol/L Final     Carbon Dioxide   Date Value Ref Range Status   06/29/2021 24 20 - 32 mmol/L Final     Carbon Dioxide (CO2)   Date Value Ref Range Status   10/14/2021 24 20 - 32 mmol/L Final     Anion Gap   Date Value Ref Range Status   10/14/2021 5 3 - 14 mmol/L Final   06/29/2021 6 3 - 14 mmol/L Final     Glucose   Date Value Ref Range Status   10/14/2021 152 (H) 70 - 99 mg/dL Final   06/29/2021 162 (H) 70 - 99 mg/dL Final     Urea Nitrogen   Date Value Ref Range Status   10/14/2021 13 7 - 30 mg/dL Final   06/29/2021 13 7 - 30 mg/dL Final     Creatinine   Date Value Ref Range Status   10/14/2021 1.03 0.52 - 1.04 mg/dL Final   06/29/2021 1.08 (H) 0.52 - 1.04 mg/dL Final     GFR Estimate   Date Value Ref Range Status   10/14/2021 58 (L) >60 mL/min/1.73m2 Final     Comment:     As of July 11, 2021, eGFR is calculated by the CKD-EPI creatinine equation, without race adjustment. eGFR can be influenced by muscle mass, exercise, and diet. The reported eGFR is an estimation only and is only applicable if the renal function is stable.   06/29/2021 54 (L) >60 mL/min/[1.73_m2] Final     Comment:     Non  GFR  Calc  Starting 12/18/2018, serum creatinine based estimated GFR (eGFR) will be   calculated using the Chronic Kidney Disease Epidemiology Collaboration   (CKD-EPI) equation.       Calcium   Date Value Ref Range Status   10/14/2021 9.1 8.5 - 10.1 mg/dL Final   06/29/2021 10.8 (H) 8.5 - 10.1 mg/dL Final     SUBJECTIVE FINDINGS:  This 66-year-old female returns to clinic for diabetic foot check and onychauxis, onychocryptosis. She relates to wearing her Diabetic shoes. Relates no ulcers or sores since we have seen her last, no injuries. She relates she does have some numbness and tingling in her feet that is unchanged.  Relates she had a pedicure for a wedding and her toenails have been painful since.     OBJECTIVE FINDINGS:  DP and PT are 2/4 bilaterally. She has decreased ankle joint dorsiflexion bilaterally. She has dorsally contracted digits bilaterally 1-5. She has flexible flat foot with dorsal medial first MPJ prominence. There is no erythema, no drainage, no odor, no calor bilaterally. She has incurvated nails 1-5 bilaterally to differing degrees with pain on palpation of toenails bilaterally.  She has some thickening, dystrophy and subungual debris of toenails bilaterally.      ASSESSMENT AND PLAN: Onychauxis and Onychomycosis bilaterally causing pain. She is diabetic with peripheral neuropathy and has Hammertoes present. Diagnosis and treatment discussed with her. All the nails were reduced or debrided bilaterally upon consent. Hallux nail borders bled upon debridement and local wound cares with Bacitracin and bandaid done upon consent and use discussed with her.  Continue Diabetic shoes and custom orthotics.  Previous notes reviewed.  Return to clinic to see me in about 2-3 months.   Moderate level of medical decision making with Number and Complexity of  Problems Addressed- moderate and Risk of Complications and/or  Morbidity or Mortality of  Patient Management- moderate.

## 2021-11-09 NOTE — NURSING NOTE
Elizabeth Palma's chief complaint for this visit includes:  Chief Complaint   Patient presents with     RECHECK     Diabetic foot care - had pedicure and toenails have been bothering her     PCP: Horacio Sheridan    Referring Provider:  No referring provider defined for this encounter.    /84   Pulse 59   LMP  (LMP Unknown)   SpO2 99%   No Pain (0)     Do you need any medication refills at today's visit? No    Elma Wiseman CMA

## 2021-11-09 NOTE — PATIENT INSTRUCTIONS
Thanks for coming today.  [facility]  [address] [place] [address]    To schedule future appointments in Ortho Clinic, you may call [phone].    To schedule ordered imaging by your provider:   Call [facility] [phone]    To schedule an injection ordered by your provider:  Call [facility] Injection scheduling line: [phone]  [email or web address] available online at:  [email or web address]    Please call if any further questions or concerns ([phone]).  Clinic hours 8 am to 5 pm.    Return to clinic (call) if symptoms worsen or fail to improve.

## 2021-11-22 ENCOUNTER — VIRTUAL VISIT (OUTPATIENT)
Dept: ENDOCRINOLOGY | Facility: CLINIC | Age: 66
End: 2021-11-22
Payer: MEDICARE

## 2021-11-22 VITALS — WEIGHT: 193 LBS | HEIGHT: 61 IN | BODY MASS INDEX: 36.44 KG/M2

## 2021-11-22 DIAGNOSIS — E66.09 CLASS 1 OBESITY DUE TO EXCESS CALORIES WITHOUT SERIOUS COMORBIDITY IN ADULT, UNSPECIFIED BMI: Primary | ICD-10-CM

## 2021-11-22 DIAGNOSIS — E66.811 CLASS 1 OBESITY DUE TO EXCESS CALORIES WITHOUT SERIOUS COMORBIDITY IN ADULT, UNSPECIFIED BMI: Primary | ICD-10-CM

## 2021-11-22 DIAGNOSIS — R06.09 DYSPNEA ON EXERTION: ICD-10-CM

## 2021-11-22 DIAGNOSIS — R05.9 COUGH: Primary | ICD-10-CM

## 2021-11-22 DIAGNOSIS — E66.01 MORBID OBESITY DUE TO EXCESS CALORIES (H): ICD-10-CM

## 2021-11-22 PROCEDURE — 99442 PR PHYSICIAN TELEPHONE EVALUATION 11-20 MIN: CPT | Mod: 95 | Performed by: INTERNAL MEDICINE

## 2021-11-22 RX ORDER — TOPIRAMATE 200 MG/1
200 TABLET, FILM COATED ORAL 2 TIMES DAILY
Qty: 60 TABLET | Refills: 11 | Status: SHIPPED | OUTPATIENT
Start: 2021-11-22

## 2021-11-22 ASSESSMENT — PAIN SCALES - GENERAL: PAINLEVEL: MODERATE PAIN (4)

## 2021-11-22 ASSESSMENT — MIFFLIN-ST. JEOR: SCORE: 1362.82

## 2021-11-22 NOTE — PROGRESS NOTES
"    Return Medical Weight Management Note     Elizabeth Palma  MRN:  [id number]  :  1955  AYSE:  3/1/2021    Dear Horacio Sheridan MD,  I had the pleasure of seeing your patient Elizabeth Palma.  She is a 65 year old female who I am continuing to see for treatment of obesity related to:     2018   I have the following health issues associated with obesity: None of the above   I have the following symptoms associated with obesity: Lymphedema, None of the above     CURRENT WEIGHT:   193 lbs 0 oz    Wt Readings from Last 4 Encounters:   21 87.5 kg (193 lb)   10/14/21 88 kg (194 lb)   10/08/21 91.6 kg (202 lb)   21 91.9 kg (202 lb 9.6 oz)     Height:  5' 1\"  Body Mass Index:  Body mass index is 36.47 kg/m .  Vitals:  B/P: Data Unavailable, P: Data Unavailable    Initial consult weight was 214 on 2015.  Weight change since last seen on 3/1/2021 is down 3 pounds.   Total loss is 45 pounds.    INTERVAL HISTORY:  Lost some weight as has no appettite and no cravings. Very tired and has not activity. Has started physical therapy. Back on topiramate 200 AM and 200 HS and says is eating very little, no naltrexone as not helping. Remains off gabapentin, and pain is ok. She feels full quickly ever since gastric bypass surgery in .     Diet Recall Review with Patient 2018   Do you typically eat breakfast? No   Do you typically eat lunch? Yes   If you do eat lunch, what types of food do you typically eat?  Eggs   Do you typically eat supper? Yes   If you do eat supper, what types of food do you typically eat? Fish or other protein    Do you typically eat snacks? Yes   If you do snack, what types of food do you typically eat? Fruit    How many glasses of juice do you drink in a typical day? 0   How many of glasses of milk do you drink in a typical day? 0   How many 8oz glasses of sugar containing drinks such as Arnel-Aid/sweet tea do you drink in a day? 0   How many cans/bottles of sugar " pop/soda/tea/sports drinks do you drink in a day? 0   How many cans/bottles of diet pop/soda/tea or sports drink do you drink in a day? 0   How often do you have a drink of alcohol? Never       MEDICATIONS:   Current Outpatient Medications   Medication     acetaminophen (TYLENOL) 500 MG tablet     ACETYLCYSTEINE PO     albuterol (PROAIR HFA/PROVENTIL HFA/VENTOLIN HFA) 108 (90 Base) MCG/ACT inhaler     ARIPiprazole (ABILIFY) 2 MG tablet     aspirin EC 81 MG EC tablet     blood glucose monitoring (ACCU-CHEK RALPH PLUS) meter device kit     blood glucose monitoring (NO BRAND SPECIFIED) meter device kit     blood glucose monitoring (NO BRAND SPECIFIED) test strip     blood glucose monitoring (SOFTCLIX) lancets     Cholecalciferol (VITAMIN D) 1000 UNITS capsule     cyanocolbalamin (VITAMIN  B-12) 1000 MCG tablet     cycloSPORINE modified (GENERIC EQUIVALENT) 25 MG capsule     dextromethorphan (DELSYM) 30 MG/5ML liquid     estradiol (VAGIFEM) 10 MCG TABS vaginal tablet     ferrous sulfate (FEROSUL) 325 (65 Fe) MG tablet     fluticasone (FLONASE) 50 MCG/ACT nasal spray     furosemide (LASIX) 40 MG tablet     gabapentin (NEURONTIN) 300 MG capsule     latanoprost (XALATAN) 0.005 % ophthalmic solution     metFORMIN (GLUCOPHAGE-XR) 500 MG 24 hr tablet     mycophenolic acid (GENERIC EQUIVALENT) 180 MG EC tablet     nystatin (MYCOSTATIN) 201433 UNIT/GM external cream     omeprazole (PRILOSEC) 40 MG DR capsule     ondansetron (ZOFRAN-ODT) 4 MG ODT tab     order for DME     Polyvinyl Alcohol-Povidone (REFRESH OP)     prazosin (MINIPRESS) 2 MG capsule     prazosin (MINIPRESS) 2 MG capsule     prazosin (MINIPRESS) 5 MG capsule     pseudoePHEDrine-guaiFENesin (MUCINEX D)  MG 12 hr tablet     pseudoePHEDrine-guaiFENesin (MUCINEX D)  MG 12 hr tablet     simvastatin (ZOCOR) 20 MG tablet     sulfamethoxazole-trimethoprim (BACTRIM) 400-80 MG tablet     topiramate (TOPAMAX) 200 MG tablet     Vaginal Lubricant (REPLENS) GEL      "vilazodone (VIIBRYD) 40 MG TABS tablet     No current facility-administered medications for this visit.     Weight Loss Medication History Reviewed With Patient 11/22/2021   Which weight loss medications are you currently taking on a regular basis?  Topamax (topiramate)   Are you having any side effects from the weight loss medication that we have prescribed you? No   If you are having side effects please describe: -     ASSESSMENT:   She reports weight loss slow despite not eating much. At max topiramate dose. Not able to walk or do much due to back pain, breathing problems and \"plugged ear\" for which she is seeing specialists.    FOLLOW-UP:    12 weeks.  Phone call duration: 15 minutes.  I explained the conditions and plans (more than 50% of time was counseling/coordination of weight management).    Sincerely,  Prakash Irene MD     The patient was evaluated via a billable telephone visit and notified:  \"This visit will be via telephone call between you and your physician. If lab work is needed we can place an order and you can later stop by the lab. Telephone visits are billed at different rates depending on your insurance coverage. During this emergency period, some insurers may be billed the same as an in-person visit.  Please check with your insurance provider with any questions. If the physician feels a telephone visit is not appropriate, you will not be charged for this service.\" Patient gave verbal consent for telephone visit and would you like to obtain AVS by [employer].      "

## 2021-11-22 NOTE — LETTER
"2021       RE: Elizabeth Palma  [address]  [address]  [facility] [address]     Dear Colleague,    Thank you for referring your patient, Elizabeth Palma, to the [facility] [place] at [facility]. Please see a copy of my visit note below.    Return Medical Weight Management Note  Elizabeth Palma  MRN:  [id number]  :  1955  AYSE:  3/1/2021    Dear Horacio Sheridan MD,  I had the pleasure of seeing your patient Elizabeth Palma.  She is a 65 year old female who I am continuing to see for treatment of obesity related to:     2018   I have the following health issues associated with obesity: None of the above   I have the following symptoms associated with obesity: Lymphedema, None of the above     CURRENT WEIGHT:   193 lbs 0 oz    Wt Readings from Last 4 Encounters:   21 87.5 kg (193 lb)   10/14/21 88 kg (194 lb)   10/08/21 91.6 kg (202 lb)   21 91.9 kg (202 lb 9.6 oz)     Height:  5' 1\"  Body Mass Index:  Body mass index is 36.47 kg/m .  Vitals:  B/P: Data Unavailable, P: Data Unavailable  Initial consult weight was 214 on 2015.  Weight change since last seen on 3/1/2021 is down 3 pounds.   Total loss is 45 pounds.    INTERVAL HISTORY:  Lost some weight as has no appettite and no cravings. Very tired and has not activity. Has started physical therapy. Back on topiramate 200 AM and 200 HS and says is eating very little, no naltrexone as not helping. Remains off gabapentin, and pain is ok. She feels full quickly ever since gastric bypass surgery in .     Diet Recall Review with Patient 2018   Do you typically eat breakfast? No   Do you typically eat lunch? Yes   If you do eat lunch, what types of food do you typically eat?  Eggs   Do you typically eat supper? Yes   If you do eat supper, what types of food do you typically eat? Fish or other protein    Do you typically eat snacks? Yes   If you " do snack, what types of food do you typically eat? Fruit    How many glasses of juice do you drink in a typical day? 0   How many of glasses of milk do you drink in a typical day? 0   How many 8oz glasses of sugar containing drinks such as Arnel-Aid/sweet tea do you drink in a day? 0   How many cans/bottles of sugar pop/soda/tea/sports drinks do you drink in a day? 0   How many cans/bottles of diet pop/soda/tea or sports drink do you drink in a day? 0   How often do you have a drink of alcohol? Never     MEDICATIONS:   Current Outpatient Medications   Medication     acetaminophen (TYLENOL) 500 MG tablet     ACETYLCYSTEINE PO     albuterol (PROAIR HFA/PROVENTIL HFA/VENTOLIN HFA) 108 (90 Base) MCG/ACT inhaler     ARIPiprazole (ABILIFY) 2 MG tablet     aspirin EC 81 MG EC tablet     blood glucose monitoring (ACCU-CHEK RALPH PLUS) meter device kit     blood glucose monitoring (NO BRAND SPECIFIED) meter device kit     blood glucose monitoring (NO BRAND SPECIFIED) test strip     blood glucose monitoring (SOFTCLIX) lancets     Cholecalciferol (VITAMIN D) 1000 UNITS capsule     cyanocolbalamin (VITAMIN  B-12) 1000 MCG tablet     cycloSPORINE modified (GENERIC EQUIVALENT) 25 MG capsule     dextromethorphan (DELSYM) 30 MG/5ML liquid     estradiol (VAGIFEM) 10 MCG TABS vaginal tablet     ferrous sulfate (FEROSUL) 325 (65 Fe) MG tablet     fluticasone (FLONASE) 50 MCG/ACT nasal spray     furosemide (LASIX) 40 MG tablet     gabapentin (NEURONTIN) 300 MG capsule     latanoprost (XALATAN) 0.005 % ophthalmic solution     metFORMIN (GLUCOPHAGE-XR) 500 MG 24 hr tablet     mycophenolic acid (GENERIC EQUIVALENT) 180 MG EC tablet     nystatin (MYCOSTATIN) 016853 UNIT/GM external cream     omeprazole (PRILOSEC) 40 MG DR capsule     ondansetron (ZOFRAN-ODT) 4 MG ODT tab     order for DME     Polyvinyl Alcohol-Povidone (REFRESH OP)     prazosin (MINIPRESS) 2 MG capsule     prazosin (MINIPRESS) 2 MG capsule     prazosin (MINIPRESS) 5 MG  "capsule     pseudoePHEDrine-guaiFENesin (MUCINEX D)  MG 12 hr tablet     pseudoePHEDrine-guaiFENesin (MUCINEX D)  MG 12 hr tablet     simvastatin (ZOCOR) 20 MG tablet     sulfamethoxazole-trimethoprim (BACTRIM) 400-80 MG tablet     topiramate (TOPAMAX) 200 MG tablet     Vaginal Lubricant (REPLENS) GEL     vilazodone (VIIBRYD) 40 MG TABS tablet     No current facility-administered medications for this visit.     Weight Loss Medication History Reviewed With Patient 11/22/2021   Which weight loss medications are you currently taking on a regular basis?  Topamax (topiramate)   Are you having any side effects from the weight loss medication that we have prescribed you? No   If you are having side effects please describe: -     ASSESSMENT:   She reports weight loss slow despite not eating much. At max topiramate dose. Not able to walk or do much due to back pain, breathing problems and \"plugged ear\" for which she is seeing specialists.    FOLLOW-UP:    12 weeks.  Phone call duration: 15 minutes.  I explained the conditions and plans (more than 50% of time was counseling/coordination of weight management).    The patient was evaluated via a billable telephone visit and notified:  \"This visit will be via telephone call between you and your physician. If lab work is needed we can place an order and you can later stop by the lab. Telephone visits are billed at different rates depending on your insurance coverage. During this emergency period, some insurers may be billed the same as an in-person visit.  Please check with your insurance provider with any questions. If the physician feels a telephone visit is not appropriate, you will not be charged for this service.\" Patient gave verbal consent for telephone visit and would you like to obtain AVS by [employer].      Again, thank you for allowing me to participate in the care of your patient.      Sincerely,    Prakash Irene MD  "

## 2021-11-22 NOTE — NURSING NOTE
"Chief Complaint   Patient presents with     RECHECK     return [facility]       Vitals:    11/22/21 0959   Weight: 87.5 kg (193 lb)   Height: 1.549 m (5' 1\")       Body mass index is 36.47 kg/m .          NAVEED CALDERON EMT    "

## 2021-11-22 NOTE — PROGRESS NOTES
"Elizabeth is a 66 year old who is being evaluated via a billable video visit.      How would you like to obtain your AVS? Mail a copy     In [place] today: Yes    {If patient encounters technical issues they should call [phone] :[id number]}    Video Start Time: {video visit start/end time for provider to select:912539}  Video-Visit Details    Type of service:  Video Visit    Video End Time:{video visit start/end time for provider to select:592199}    Originating Location (pt. Location): {video visit patient location:812770::\"Home\"}    Distant Location (provider location):  [facility] [place]     Platform used for Video Visit: {Virtual Visit Platforms:503911::\"[employer]\"}  "

## 2021-11-23 ENCOUNTER — TELEPHONE (OUTPATIENT)
Dept: PULMONOLOGY | Facility: CLINIC | Age: 66
End: 2021-11-23
Payer: MEDICARE

## 2021-11-23 NOTE — TELEPHONE ENCOUNTER
Spoke with pt regarding scheduling a CXR and FPFT prior to NEW appt with Dr. Concepcion on 2/11 as testing not completed within 3 months. Pt did request sooner appt and opening with Dr. Mathias on 1/18. Due to earlier appt. FPFT will not be repeated, but CXR was scheduled. Details confirmed with pt who requested hard copy be mailed to her home; mailing address verified.

## 2021-11-24 DIAGNOSIS — N18.5 CHRONIC KIDNEY DISEASE, STAGE V (H): ICD-10-CM

## 2021-11-26 RX ORDER — SIMVASTATIN 20 MG
20 TABLET ORAL AT BEDTIME
Qty: 90 TABLET | Refills: 0 | Status: SHIPPED | OUTPATIENT
Start: 2021-11-26 | End: 2022-03-18

## 2021-11-26 NOTE — TELEPHONE ENCOUNTER
Last Clinic Visit: 10/28/2021  [facility] [place]    Lab(s)- LDL past due.  Dorothy refill of Simvastatin was sent for a 90 day supply and FYI to provider.

## 2021-12-08 ENCOUNTER — TELEPHONE (OUTPATIENT)
Dept: TRANSPLANT | Facility: CLINIC | Age: 66
End: 2021-12-08
Payer: MEDICARE

## 2021-12-08 NOTE — TELEPHONE ENCOUNTER
Patient missed AM meds today, wondering if she should just skip her IS meds.  Instructed to set an alarm for 6 hours after PM meds & take IS meds at that time.

## 2021-12-10 DIAGNOSIS — F51.5 NIGHTMARES ASSOCIATED WITH CHRONIC POST-TRAUMATIC STRESS DISORDER: ICD-10-CM

## 2021-12-10 DIAGNOSIS — F43.12 NIGHTMARES ASSOCIATED WITH CHRONIC POST-TRAUMATIC STRESS DISORDER: ICD-10-CM

## 2021-12-13 RX ORDER — PRAZOSIN HYDROCHLORIDE 2 MG/1
CAPSULE ORAL
Qty: 90 CAPSULE | Refills: 1 | Status: SHIPPED | OUTPATIENT
Start: 2021-12-13 | End: 2022-04-26

## 2021-12-13 NOTE — TELEPHONE ENCOUNTER
prazosin (MINIPRESS) 2 MG  Last refilled: 9/9/21  Qty: 30:2     Last seen: 9/23/21  RTC:1  mos  Cancel: 12/9  No-show: 0  Next appt: 12/14/21  Refilled for 30 days per protocol.

## 2021-12-14 ENCOUNTER — VIRTUAL VISIT (OUTPATIENT)
Dept: PSYCHIATRY | Facility: CLINIC | Age: 66
End: 2021-12-14
Payer: MEDICARE

## 2021-12-14 DIAGNOSIS — E66.01 MORBID OBESITY DUE TO EXCESS CALORIES (H): ICD-10-CM

## 2021-12-14 DIAGNOSIS — F43.12 NIGHTMARES ASSOCIATED WITH CHRONIC POST-TRAUMATIC STRESS DISORDER: ICD-10-CM

## 2021-12-14 DIAGNOSIS — F33.1 MAJOR DEPRESSIVE DISORDER, RECURRENT EPISODE, MODERATE (H): Primary | ICD-10-CM

## 2021-12-14 DIAGNOSIS — F51.5 NIGHTMARES ASSOCIATED WITH CHRONIC POST-TRAUMATIC STRESS DISORDER: ICD-10-CM

## 2021-12-14 DIAGNOSIS — F42.4 EXCORIATION (SKIN-PICKING) DISORDER: ICD-10-CM

## 2021-12-14 RX ORDER — ARIPIPRAZOLE 2 MG/1
3 TABLET ORAL 2 TIMES DAILY
Qty: 45 TABLET | Refills: 3 | Status: SHIPPED | OUTPATIENT
Start: 2021-12-14 | End: 2022-03-03

## 2021-12-14 RX ORDER — PRAZOSIN HYDROCHLORIDE 2 MG/1
CAPSULE ORAL
Qty: 30 CAPSULE | Refills: 3 | Status: SHIPPED | OUTPATIENT
Start: 2021-12-14 | End: 2022-04-26

## 2021-12-14 RX ORDER — VILAZODONE HYDROCHLORIDE 40 MG/1
40 TABLET ORAL DAILY
Qty: 30 TABLET | Refills: 3 | Status: SHIPPED | OUTPATIENT
Start: 2021-12-14 | End: 2022-03-03

## 2021-12-14 RX ORDER — PRAZOSIN HYDROCHLORIDE 5 MG/1
CAPSULE ORAL
Qty: 90 CAPSULE | Refills: 3 | Status: SHIPPED | OUTPATIENT
Start: 2021-12-14 | End: 2022-03-03

## 2021-12-14 ASSESSMENT — PATIENT HEALTH QUESTIONNAIRE - PHQ9: SUM OF ALL RESPONSES TO PHQ QUESTIONS 1-9: 15
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
## 2021-12-23 ENCOUNTER — OFFICE VISIT (OUTPATIENT)
Dept: OPHTHALMOLOGY | Facility: CLINIC | Age: 66
End: 2021-12-23
Attending: OPHTHALMOLOGY
Payer: MEDICARE

## 2021-12-23 DIAGNOSIS — H51.8 DIVERGENCE INSUFFICIENCY: ICD-10-CM

## 2021-12-23 DIAGNOSIS — H40.9 MILD STAGE GLAUCOMA: ICD-10-CM

## 2021-12-23 DIAGNOSIS — E11.42 TYPE 2 DIABETES MELLITUS WITH PERIPHERAL NEUROPATHY (H): Primary | ICD-10-CM

## 2021-12-23 DIAGNOSIS — Z96.1 PSEUDOPHAKIA OF BOTH EYES: ICD-10-CM

## 2021-12-23 PROCEDURE — 99214 OFFICE O/P EST MOD 30 MIN: CPT | Performed by: OPHTHALMOLOGY

## 2021-12-23 PROCEDURE — G0463 HOSPITAL OUTPT CLINIC VISIT: HCPCS

## 2021-12-23 PROCEDURE — 92133 CPTRZD OPH DX IMG PST SGM ON: CPT | Performed by: OPHTHALMOLOGY

## 2021-12-23 RX ORDER — LATANOPROST 50 UG/ML
1 SOLUTION/ DROPS OPHTHALMIC AT BEDTIME
Qty: 7.5 ML | Refills: 4 | Status: SHIPPED | OUTPATIENT
Start: 2021-12-23

## 2021-12-23 ASSESSMENT — CONF VISUAL FIELD
OS_SUPERIOR_NASAL_RESTRICTION: 3
OS_SUPERIOR_TEMPORAL_RESTRICTION: 3
OD_NORMAL: 1

## 2021-12-23 ASSESSMENT — CUP TO DISC RATIO
OS_RATIO: 0.7
OD_RATIO: 0.8

## 2021-12-23 ASSESSMENT — REFRACTION_WEARINGRX
OD_AXIS: 054
OD_SPHERE: -1.75
OS_CYLINDER: +2.00
OD_ADD: +2.50
OD_CYLINDER: +1.75
OS_ADD: +2.50
OS_SPHERE: -1.25
OS_AXIS: 106

## 2021-12-23 ASSESSMENT — VISUAL ACUITY
OS_CC: 20/30
METHOD: SNELLEN - LINEAR
CORRECTION_TYPE: GLASSES
OD_CC: 20/20

## 2021-12-23 ASSESSMENT — TONOMETRY
IOP_METHOD: TONOPEN
OD_IOP_MMHG: 18
OS_IOP_MMHG: 18

## 2021-12-23 ASSESSMENT — EXTERNAL EXAM - RIGHT EYE: OD_EXAM: NORMAL

## 2021-12-23 ASSESSMENT — EXTERNAL EXAM - LEFT EYE: OS_EXAM: NORMAL

## 2021-12-23 NOTE — NURSING NOTE
"Chief Complaints and History of Present Illnesses   Patient presents with     Follow Up     Chief Complaint(s) and History of Present Illness(es)     Follow Up               Comments     Pt presents herself for a 1 year follow up appt. Pt states that her vision seems slightly diminished, mostly in her near vision. Pt reports that she sees floaters in both eyes consistently- \"lots of them\". Pt denies new flashes, distortion, double vision, or pain in either eye.    Ruben Ruiz OT 1:51 PM December 23, 2021                   "

## 2021-12-23 NOTE — PROGRESS NOTES
"Chief Complaint(s) and History of Present Illness(es)     Follow Up               Comments     Pt presents herself for a 1 year follow up appt. Pt states that her   vision seems slightly diminished, mostly in her near vision. Pt reports   that she sees floaters in both eyes consistently- \"lots of them\". Pt   denies new flashes, distortion, double vision, or pain in either eye.    Ruben Ruiz OT 1:51 PM December 23, 2021               Review of systems for the eyes was negative other than the pertinent positives/negatives listed in the HPI.      Assessment & Plan      Elizabeth Palma is a 66 year old female with the following diagnoses:   1. DM type 2 with Neuropathy, Hgb A1C 7.3 on 1/25/21    2. Divergence insufficiency    3. Pseudophakia of both eyes    4. Mild stage glaucoma - Right Eye         Here for annual exam  Glasses from last year have not been working well  Noted bifocal to sit very high in current frames  Did not get refraction today   Recommend return for tech refraction.  Will make note on prescription to ensure bifocal at appropriate height    Glaucoma suspect based on age and increased C/D both eyes  IOP today 18 both eyes   Maximum intraocular pressure 21/18  Family history: negative  Trauma history: negative  Pachymetry : 532/527     Using latanoprost at bedtime both eyes with good compliance  Intraocular pressure acceptable both eyes   OCT Nerve fiber layer stable both eyes today   Continue latanoprost at bedtime both eyes - refill sent    No retinopathy  Stressed good glycemic and hypertensive control  Monitor yearly      Continue artificial tears twice a day and as needed   Warm compresses as needed        Patient disposition:   Return in about 1 year (around 12/23/2022) for DFE, OCT NFL.           Attending Physician Attestation:  Complete documentation of historical and exam elements from today's encounter can be found in the full encounter summary report (not reduplicated in this progress " note).  I personally obtained the chief complaint(s) and history of present illness.  I confirmed and edited as necessary the review of systems, past medical/surgical history, family history, social history, and examination findings as documented by others; and I examined the patient myself.  I personally reviewed the relevant tests, images, and reports as documented above.  I formulated and edited as necessary the assessment and plan and discussed the findings and management plan with the patient and family. . - Yonas Underwood MD

## 2021-12-29 ENCOUNTER — TELEPHONE (OUTPATIENT)
Dept: OTOLARYNGOLOGY | Facility: CLINIC | Age: 66
End: 2021-12-29
Payer: MEDICARE

## 2021-12-29 DIAGNOSIS — Z01.10 ENCOUNTER FOR HEARING TEST: Primary | ICD-10-CM

## 2021-12-30 ENCOUNTER — ALLIED HEALTH/NURSE VISIT (OUTPATIENT)
Dept: OPHTHALMOLOGY | Facility: CLINIC | Age: 66
End: 2021-12-30
Attending: OPHTHALMOLOGY
Payer: MEDICARE

## 2021-12-30 DIAGNOSIS — Z96.1 PSEUDOPHAKIA OF BOTH EYES: Primary | ICD-10-CM

## 2021-12-30 PROCEDURE — 92015 DETERMINE REFRACTIVE STATE: CPT | Mod: GY

## 2021-12-30 PROCEDURE — 999N000103 HC STATISTIC NO CHARGE FACILITY FEE

## 2021-12-30 PROCEDURE — 99207 PR NO CHARGE COORDINATED CARE PS: CPT

## 2021-12-30 ASSESSMENT — REFRACTION_MANIFEST
OD_AXIS: 045
OS_ADD: +2.75
OD_SPHERE: -1.50
OD_ADD: +2.75
OS_AXIS: 110
OD_CYLINDER: +0.75
OS_CYLINDER: +2.00
OS_SPHERE: -1.25

## 2021-12-30 ASSESSMENT — REFRACTION_WEARINGRX
OD_SPHERE: -1.75
OD_ADD: +2.50
OS_ADD: +2.50
OD_CYLINDER: +1.75
OS_AXIS: 106
OD_AXIS: 054
OS_SPHERE: -1.25
OS_CYLINDER: +2.00

## 2021-12-30 ASSESSMENT — VISUAL ACUITY
CORRECTION_TYPE: GLASSES
OD_CC: 20/30
METHOD: SNELLEN - LINEAR
OS_CC: 20/25

## 2022-01-03 ENCOUNTER — TRANSFERRED RECORDS (OUTPATIENT)
Dept: HEALTH INFORMATION MANAGEMENT | Facility: CLINIC | Age: 67
End: 2022-01-03
Payer: MEDICARE

## 2022-01-03 NOTE — PROGRESS NOTES
Tech visit for prescription only, not seen by physician.      Yonas Underwood MD  , Comprehensive Ophthalmology  Department of Ophthalmology and Visual Neurosciences  [facility]

## 2022-01-05 NOTE — TELEPHONE ENCOUNTER
RECORDS RECEIVED FROM: internal/ce    DATE RECEIVED: 1/18/22   NOTES STATUS DETAILS   OFFICE NOTE from referring provider internal  Juan Talbot MD   OFFICE NOTE from other specialist na    DISCHARGE SUMMARY from hospital na    DISCHARGE REPORT from the ER na    MEDICATION LIST internal     IMAGING  (NEED IMAGES AND REPORTS)     CT SCAN na    CHEST XRAY (CXR) internal /ce [facility]- 9.22.21  Scheduled 1.18.22   TESTS     PULMONARY FUNCTION TESTING (PFT) internal  10.22.21      Action 1.5.22 sv   Action Taken Image request sent to [facility] for=  CXR- 9.22.21- received image-

## 2022-01-06 ENCOUNTER — PRE VISIT (OUTPATIENT)
Dept: OTOLARYNGOLOGY | Facility: CLINIC | Age: 67
End: 2022-01-06

## 2022-01-08 DIAGNOSIS — E11.42 TYPE 2 DIABETES MELLITUS WITH DIABETIC POLYNEUROPATHY, WITHOUT LONG-TERM CURRENT USE OF INSULIN (H): ICD-10-CM

## 2022-01-08 NOTE — TELEPHONE ENCOUNTER
gabapentin (NEURONTIN) 300 MG capsule     Last Written Prescription Date: 3/3/21  Last Fill Quantity: 180,   # refills: 3  Last Office Visit : 10/28/21  Future Office visit:  None    Routing refill request to provider for review/approval because: not on protocol

## 2022-01-09 DIAGNOSIS — I10 ESSENTIAL HYPERTENSION: ICD-10-CM

## 2022-01-09 DIAGNOSIS — R60.9 EDEMA, UNSPECIFIED TYPE: Primary | ICD-10-CM

## 2022-01-10 RX ORDER — GABAPENTIN 300 MG/1
600 CAPSULE ORAL AT BEDTIME
Qty: 180 CAPSULE | Refills: 2 | Status: SHIPPED | OUTPATIENT
Start: 2022-01-10 | End: 2022-07-05 | Stop reason: DRUGHIGH

## 2022-01-11 RX ORDER — FUROSEMIDE 40 MG
40 TABLET ORAL DAILY
Qty: 90 TABLET | Refills: 3 | Status: SHIPPED | OUTPATIENT
Start: 2022-01-11 | End: 2022-04-27

## 2022-01-13 ENCOUNTER — VIRTUAL VISIT (OUTPATIENT)
Dept: INTERNAL MEDICINE | Facility: CLINIC | Age: 67
End: 2022-01-13
Payer: MEDICARE

## 2022-01-13 DIAGNOSIS — E66.01 MORBID OBESITY DUE TO EXCESS CALORIES (H): ICD-10-CM

## 2022-01-13 DIAGNOSIS — G25.0 BENIGN ESSENTIAL TREMOR: ICD-10-CM

## 2022-01-13 DIAGNOSIS — D84.9 IMMUNOSUPPRESSED STATUS (H): ICD-10-CM

## 2022-01-13 DIAGNOSIS — K21.00 GASTROESOPHAGEAL REFLUX DISEASE WITH ESOPHAGITIS WITHOUT HEMORRHAGE: ICD-10-CM

## 2022-01-13 DIAGNOSIS — F33.1 MAJOR DEPRESSIVE DISORDER, RECURRENT EPISODE, MODERATE (H): ICD-10-CM

## 2022-01-13 DIAGNOSIS — N18.5 CHRONIC KIDNEY DISEASE, STAGE V (H): ICD-10-CM

## 2022-01-13 DIAGNOSIS — E11.42 TYPE 2 DIABETES MELLITUS WITH PERIPHERAL NEUROPATHY (H): ICD-10-CM

## 2022-01-13 DIAGNOSIS — N25.81 SECONDARY HYPERPARATHYROIDISM (H): ICD-10-CM

## 2022-01-13 DIAGNOSIS — G25.0 BENIGN ESSENTIAL TREMOR: Primary | ICD-10-CM

## 2022-01-13 PROBLEM — I50.31 ACUTE DIASTOLIC CONGESTIVE HEART FAILURE (H): Status: RESOLVED | Noted: 2021-10-08 | Resolved: 2022-01-13

## 2022-01-13 PROCEDURE — 99215 OFFICE O/P EST HI 40 MIN: CPT | Mod: 95 | Performed by: INTERNAL MEDICINE

## 2022-01-13 RX ORDER — PROPRANOLOL HYDROCHLORIDE 20 MG/1
20 TABLET ORAL 3 TIMES DAILY
Qty: 90 TABLET | Refills: 3 | Status: SHIPPED | OUTPATIENT
Start: 2022-01-13 | End: 2022-02-16

## 2022-01-13 RX ORDER — PANTOPRAZOLE SODIUM 40 MG/1
40 TABLET, DELAYED RELEASE ORAL DAILY
Qty: 90 TABLET | Refills: 3 | Status: SHIPPED | OUTPATIENT
Start: 2022-01-13

## 2022-01-13 NOTE — PROGRESS NOTES
SUBJECTIVE/OBJECTIVE:    Elizabeth Palma is a 66 year old female who presents to clinic today for the following health issues:    She has a very bad return of a tremor in her hands - she says it has been getting worse recently x 2 weeks. Nothing she can attribute to this - started vitamins, no change in caffeine, alcohol, stress. She has a hard time with pushing buttons and will drop her phone. The tremor is there at rest but also with action and says it is worse with action. It is mostly in the hands but sometimes the legs will do it too - not here head.     She talked to somebody about getting on a med (she said premarin) but then he retired.  I was able to find notes from Dr. Brunner who had proposed potential diagnoses for what this could be but settled on essential tremor with atypical features.  He noted that there is a family history of this.  He had wanted to start her on primidone but he mentioned and I looked up during the visit that there is a major interaction with cyclosporine.  He also mentioned that she should visit with a colleague of his who is an expert in tremors to see if there is a potential other treatment that may be available    She also mentions that she has been having frequent heart burn.  Sometimes she will wake up with it and sometimes in the day. Sometimes it will get to her throat. No acid in your mouth.  No foods will make it worse.  She has caffeine in the morning but no effect.      The following have been reviewed:  Medication list  Allergies  Family history -her mother has a tremor similar to this    EGD 2015 - LA Grade A reflux esophagitis.     ASSESSMENT/PLAN:   Essential tremor -I let her know that this is a bit outside of my field of expertise and it also seem like the general neurologist that she had seen was a bit puzzled by the next step also.  He did mention one medication which is not feasible given her medications.  As a trial I recommended propranolol 20mg TID to see if  this may reduce the severity of the tremor but also make a referral to a tremor specialist within neurology.  I talked through in detail the use of the propranolol including the potential side effect of a decreased heart rate and blood pressure.  She expressed understanding and will take caution with starting the medication especially.    Uncontrolled reflux disease -she does have a history of reflux esophagitis and is currently on omeprazole.  I recommend a switch to a different PPI such as Protonix as an next step.  If this is ineffective then potentially we can increase the dose from 40 mg to 80 mg.  If still not effective she may need a repeat endoscopy to ensure that there is not sphincter dysfunction, esophageal disease, or other abnormality of the upper GI system.    Number and complexity of problems:  1 unstable chronic illness or with exacerbation (GERD)  1 undiagnosed new problem (tremor)    Amount and Complexity of Data Reviewed/Analyzed:  1 External notes reviewed    Risk of complication/morbidity of patient management:  Patient receiving prescription management    17 minutes precharting, 21 minutes for the visit and 9 min on charting and updating records for a total of 47 minutes spent on the date of the encounter doing chart review, history and exam, documentation and further activities as noted above    Video/Telephone visit start time: This visit was split between telephone and video because lack of sound on the video: 1:38pm  Video/Telephone visit end time: 1:59pm    Originating Location (pt. Location): Home    Distant Location (provider location):  [facility] [place]     Mode of Communication:  Video Conference via Claudia Sheridan MD, FACP

## 2022-01-15 ASSESSMENT — ENCOUNTER SYMPTOMS
ALTERED TEMPERATURE REGULATION: 1
DISTURBANCES IN COORDINATION: 1
FEVER: 0
MUSCLE CRAMPS: 1
SINUS CONGESTION: 1
EYE PAIN: 0
SORE THROAT: 0
STIFFNESS: 1
MEMORY LOSS: 0
MYALGIAS: 1
DECREASED APPETITE: 0
SPEECH CHANGE: 0
INCREASED ENERGY: 0
INSOMNIA: 1
DECREASED CONCENTRATION: 0
NUMBNESS: 1
POLYDIPSIA: 0
JOINT SWELLING: 1
TROUBLE SWALLOWING: 0
POSTURAL DYSPNEA: 0
ARTHRALGIAS: 1
NIGHT SWEATS: 1
TREMORS: 1
SPUTUM PRODUCTION: 0
NECK MASS: 0
EYE WATERING: 0
TASTE DISTURBANCE: 0
SNORES LOUDLY: 0
EYE REDNESS: 1
MUSCLE WEAKNESS: 1
HALLUCINATIONS: 0
NERVOUS/ANXIOUS: 1
DOUBLE VISION: 0
SINUS PAIN: 0
NECK PAIN: 1
DYSPNEA ON EXERTION: 0
HOARSE VOICE: 0
WEAKNESS: 1
EYE IRRITATION: 0
SMELL DISTURBANCE: 0
TINGLING: 1
WEIGHT LOSS: 0
DEPRESSION: 1
FATIGUE: 1
COUGH: 0
COUGH DISTURBING SLEEP: 0
HEMOPTYSIS: 0
DIZZINESS: 1
CHILLS: 0
WEIGHT GAIN: 0
SHORTNESS OF BREATH: 1
POLYPHAGIA: 0
PANIC: 1
LOSS OF CONSCIOUSNESS: 0
SEIZURES: 1
BACK PAIN: 1
HEADACHES: 1
PARALYSIS: 0
WHEEZING: 0

## 2022-01-16 ENCOUNTER — TELEPHONE (OUTPATIENT)
Dept: TRANSPLANT | Facility: CLINIC | Age: 67
End: 2022-01-16
Payer: MEDICARE

## 2022-01-16 NOTE — TELEPHONE ENCOUNTER
paged at 2 12 AM.  Has been having severe heartburn.  It has been going on for many days.  It was sternal in nature.  However occasionally it radiated.  They are unsure if it is her heart or not.  They requested to know if it was her heart or not patient and  were referred to ED for further work-up of heart concerns

## 2022-01-17 RX ORDER — PROPRANOLOL HYDROCHLORIDE 20 MG/1
TABLET ORAL
Qty: 270 TABLET | OUTPATIENT
Start: 2022-01-17

## 2022-01-18 ENCOUNTER — TELEPHONE (OUTPATIENT)
Dept: PULMONOLOGY | Facility: CLINIC | Age: 67
End: 2022-01-18

## 2022-01-18 ENCOUNTER — ANCILLARY PROCEDURE (OUTPATIENT)
Dept: GENERAL RADIOLOGY | Facility: CLINIC | Age: 67
End: 2022-01-18
Attending: PEDIATRICS
Payer: MEDICARE

## 2022-01-18 ENCOUNTER — OFFICE VISIT (OUTPATIENT)
Dept: PULMONOLOGY | Facility: CLINIC | Age: 67
End: 2022-01-18
Attending: PEDIATRICS
Payer: MEDICARE

## 2022-01-18 ENCOUNTER — PRE VISIT (OUTPATIENT)
Dept: PULMONOLOGY | Facility: CLINIC | Age: 67
End: 2022-01-18

## 2022-01-18 VITALS
HEART RATE: 52 BPM | SYSTOLIC BLOOD PRESSURE: 130 MMHG | DIASTOLIC BLOOD PRESSURE: 85 MMHG | WEIGHT: 189 LBS | OXYGEN SATURATION: 96 % | BODY MASS INDEX: 35.71 KG/M2

## 2022-01-18 DIAGNOSIS — R05.9 COUGH: ICD-10-CM

## 2022-01-18 DIAGNOSIS — J45.40 MODERATE PERSISTENT ASTHMA WITHOUT COMPLICATION: Primary | ICD-10-CM

## 2022-01-18 DIAGNOSIS — J45.40 MODERATE PERSISTENT ASTHMA WITHOUT COMPLICATION: ICD-10-CM

## 2022-01-18 DIAGNOSIS — R06.09 DYSPNEA ON EXERTION: ICD-10-CM

## 2022-01-18 PROCEDURE — 99204 OFFICE O/P NEW MOD 45 MIN: CPT | Performed by: STUDENT IN AN ORGANIZED HEALTH CARE EDUCATION/TRAINING PROGRAM

## 2022-01-18 PROCEDURE — 71046 X-RAY EXAM CHEST 2 VIEWS: CPT | Mod: GC | Performed by: RADIOLOGY

## 2022-01-18 PROCEDURE — G0463 HOSPITAL OUTPT CLINIC VISIT: HCPCS | Mod: 25

## 2022-01-18 RX ORDER — FLUTICASONE PROPIONATE 220 UG/1
1 AEROSOL, METERED RESPIRATORY (INHALATION) 2 TIMES DAILY
Qty: 12 G | Refills: 3 | Status: SHIPPED | OUTPATIENT
Start: 2022-01-18 | End: 2022-04-19

## 2022-01-18 RX ORDER — LEVALBUTEROL TARTRATE 45 UG/1
2 AEROSOL, METERED ORAL EVERY 6 HOURS PRN
Qty: 15 G | Refills: 3 | Status: CANCELLED | OUTPATIENT
Start: 2022-01-18

## 2022-01-18 RX ORDER — LEVALBUTEROL TARTRATE 45 UG/1
2 AEROSOL, METERED ORAL EVERY 6 HOURS PRN
Qty: 15 G | Refills: 3 | Status: SHIPPED | OUTPATIENT
Start: 2022-01-18

## 2022-01-18 ASSESSMENT — ASTHMA QUESTIONNAIRES
ACT_TOTALSCORE: 14
QUESTION_2 LAST FOUR WEEKS HOW OFTEN HAVE YOU HAD SHORTNESS OF BREATH: ONCE A DAY
QUESTION_4 LAST FOUR WEEKS HOW OFTEN HAVE YOU USED YOUR RESCUE INHALER OR NEBULIZER MEDICATION (SUCH AS ALBUTEROL): ONCE A WEEK OR LESS
QUESTION_1 LAST FOUR WEEKS HOW MUCH OF THE TIME DID YOUR ASTHMA KEEP YOU FROM GETTING AS MUCH DONE AT WORK, SCHOOL OR AT HOME: A LITTLE OF THE TIME
QUESTION_3 LAST FOUR WEEKS HOW OFTEN DID YOUR ASTHMA SYMPTOMS (WHEEZING, COUGHING, SHORTNESS OF BREATH, CHEST TIGHTNESS OR PAIN) WAKE YOU UP AT NIGHT OR EARLIER THAN USUAL IN THE MORNING: FOUR OR MORE NIGHTS A WEEK
QUESTION_5 LAST FOUR WEEKS HOW WOULD YOU RATE YOUR ASTHMA CONTROL: SOMEWHAT CONTROLLED

## 2022-01-18 NOTE — TELEPHONE ENCOUNTER
Prior Authorization Retail Medication Request    Medication/Dose: Xopenex  ICD code (if different than what is on RX):    Previously Tried and Failed:  Albuterol, ventolin, proair   Rationale:  Albuterol causes rapid heart rate and shakiness (see Dr. Mathias's note 1/18)     Insurance Name:    Insurance ID:        Pharmacy Information (if different than what is on RX)  Name:    Phone:

## 2022-01-18 NOTE — PROGRESS NOTES
Pulmonary Clinic Note    Chief Complaint   Patient presents with     General Visit     new pt       A/P:  66F with symptoms c/w mild-moderate persistent asthma. Her PFTs are normal. Her CXR is normal. I would like her to start taking fluticasone inhaler twice daily. For her rescue inhaler, she is having side effects to albuterol, I have prescribed levalbuterol with hopes that insurance will accept the alternative and this will improve her side effects.     History:  66F HTN, HLD, GERD, obesity, ADPKD s/p txp (cyclosporin, mycophenolate), ?asthma being seen for chronic cough. Saw PCP in October, on prn albuterol, PFTs ordered. PFTs normal. Cough is now largely gone. She has SOB, both with activity and rest. Present for several months, since her hospitalization at [facility] for E coli UTI and bactermia. It is stable. She hears wheezing. She wakes up at night with SOB and cough, 3-4x per week. She has albuterol, but it makes her feel shakey so she avoids it, but it does improve her breathing. She has significant GERD.    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 No MJ or vaping. Originally from [place], lives in [place] now. No obvious exposures.     10 point review of systems negative, aside from that mentioned in HPI.    /85   Pulse 52   Wt 85.7 kg (189 lb)   LMP  (LMP Unknown)   SpO2 96%   BMI 35.71 kg/m    Gen: well-appearing  HEENT: Mallampati III  Card: RRR  Pulm: diminished bilaterally   Abd: soft  MSK: no edema  Skin: no obvious rash  Psych: normal affect  Neuro: alert and oriented     Labs:  Personally reviewed    Imaging/Studies: Personally reviewed    Past Medical History:   Diagnosis Date     Abnormal MRI, cervical spine 10/15/2011    4/2011; mild changes noted. Study done for left arm symptoms Impression:  1. Mild multilevel degenerative disc disease with no significant canal or neural stenosis seen. motion artifact on the STIR images in these are not interpretable. The remaining  images were interpreted      Autosomal dominant polycystic kidney disease 2011     (Problem list name updated by automated process. Provider to review and confirm.)     CMC DJD(carpometacarpal degenerative joint disease), localized primary 2013     -donor kidney transplant 2014     Gastroesophageal reflux disease      Generalized anxiety disorder 11/15/2012     Glaucoma      Hyperlipidemia 10/15/2011     Hyperparathyroidism, secondary (H) 2015     Hypertension     resolved     Immunosuppressed status (H) 2014     Major depressive disorder, recurrent episode, moderate (H) 11/15/2012     Obesity (BMI 30-39.9)      OP (osteoporosis) T score -3.8 2009 T-score -3.7      LION (obstructive sleep apnoea) 10/15/2012    reported intolerant to CPAP -- she says she doesn't have LION     Pain in joint, forearm -- L unhealed Fx 2013     Premature menopause age 37 07/10/2012    OCP (vaginal bldg)-->HT which she stopped 2 mo later documented at 2007 visit (age 51).      Restless leg syndrome      Stiffness of joint, not elsewhere classified, hand 2013     Tremor 10/15/2011    head     Type 2 DM with Neuropathy     started with gestational diabetes     Uncomplicated asthma      Past Surgical History:   Procedure Laterality Date     ABDOMEN SURGERY       ANKLE SURGERY       C/SECTION, LOW TRANSVERSE      x 2     CHOLECYSTECTOMY       COLONOSCOPY       ESOPHAGOSCOPY, GASTROSCOPY, DUODENOSCOPY (EGD), COMBINED N/A 2015    Procedure: COMBINED ESOPHAGOSCOPY, GASTROSCOPY, DUODENOSCOPY (EGD);  Surgeon: Sky Davey MD;  Location:  GI     ESOPHAGOSCOPY, GASTROSCOPY, DUODENOSCOPY (EGD), COMBINED N/A 2015    Procedure: COMBINED ESOPHAGOSCOPY, GASTROSCOPY, DUODENOSCOPY (EGD), BIOPSY SINGLE OR MULTIPLE;  Surgeon: Sky Davey MD;  Location:  GI     EYE SURGERY       LAPAROSCOPY, SURGICAL; REPAIR INCISIONAL OR VENTRAL HERNIA       LASER  SURGERY OF EYE Left 10/01/2020    sever vitreous strands     ORTHOPEDIC SURGERY       HERMINIA EN Y BOWEL  1990     WRIST SURGERY       ZZC TRANSPLANTATION OF KIDNEY  03/2014     Family History   Problem Relation Age of Onset     Mental Illness Other         family hx     Heart Disease Other      Diabetes Other      Cancer Other      Genetic Disorder Father      Mental Illness Father      Diabetes Father      Hypertension Father      Hyperlipidemia Mother      Diabetes Mother      Hypertension Mother      Mental Illness Other      Diabetes Other      Glaucoma No family hx of      Macular Degeneration No family hx of      Social History     Socioeconomic History     Marital status:      Spouse name: Rahat     Number of children: 2     Years of education: Not on file     Highest education level: Not on file   Occupational History     Comment: part-time   Tobacco Use     Smoking status: Never Smoker     Smokeless tobacco: Never Used   Substance and Sexual Activity     Alcohol use: No     Alcohol/week: 0.0 standard drinks     Drug use: No     Sexual activity: Yes     Partners: Male     Birth control/protection: None     Comment: 1 partner   Other Topics Concern     Parent/sibling w/ CABG, MI or angioplasty before 65F 55M? 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)      Social Determinants of Health     Financial Resource Strain: Not on file   Food Insecurity: Not on file   Transportation Needs: Not on file   Physical Activity: Not on file   Stress: Not on file   Social Connections: Not on file   Intimate Partner Violence: Not on file   Housing Stability: Not on file       50 minutes spent reviewing chart, reviewing test results, talking with and examining patient, formulating plan, and documentation on the day of the encounter.    Juan Mathias MD  Pulmonary and Critical Care Medicine  [facility]

## 2022-01-18 NOTE — TELEPHONE ENCOUNTER
Prior Authorization Approval    Authorization Effective Date: 12/21/2021  Authorization Expiration Date: 1/20/2023  Medication: levalbuterol (XOPENEX HFA) 45 MCG/ACT inhaler   Approved Dose/Quantity:   Reference #:     Insurance Company: [employer] - Phone [phone] Fax [phone]  Expected CoPay:       CoPay Card Available:      Foundation Assistance Needed:    Which Pharmacy is filling the prescription (Not needed for infusion/clinic administered): [id number] - [place], [place] - [address] AT [facility] [address]  Pharmacy Notified: Yes  Patient Notified: No

## 2022-01-18 NOTE — NURSING NOTE
Chief Complaint   Patient presents with     General Visit     new pt     Vitals were taken and medications were reconciled.     RADHA Ogden

## 2022-01-18 NOTE — PATIENT INSTRUCTIONS
Thank you for coming to pulmonary clinic. Your pulmonary function tests are normal. Your chest imaging is normal. Your symptoms are consistent with asthma. I would like you to start taking Flovent twice daily, be sure to rinse your mouth out after using. I have also prescribed Xopenex as your rescue inhaler since you are having side effects to albuterol. I will see you back in 3 months.

## 2022-01-18 NOTE — LETTER
1/18/2022     RE: Elizabeth Palma  [address]  [address]  [facility] [address]    Dear Colleague,    Thank you for referring your patient, Elizabeth Palma, to the [facility] [place]. Please see a copy of my visit note below.    Pulmonary Clinic Note    Chief Complaint   Patient presents with     General Visit     new pt       A/P:  66F with symptoms c/w mild-moderate persistent asthma. Her PFTs are normal. Her CXR is normal. I would like her to start taking fluticasone inhaler twice daily. For her rescue inhaler, she is having side effects to albuterol, I have prescribed levalbuterol with hopes that insurance will accept the alternative and this will improve her side effects.     History:  66F HTN, HLD, GERD, obesity, ADPKD s/p txp (cyclosporin, mycophenolate), ?asthma being seen for chronic cough. Saw PCP in October, on prn albuterol, PFTs ordered. PFTs normal. Cough is now largely gone. She has SOB, both with activity and rest. Present for several months, since her hospitalization at [facility] for E coli UTI and bactermia. It is stable. She hears wheezing. She wakes up at night with SOB and cough, 3-4x per week. She has albuterol, but it makes her feel shakey so she avoids it, but it does improve her breathing. She has significant GERD.    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 Originally from [place], lives in [place] now. No obvious exposures.     10 point review of systems negative, aside from that mentioned in HPI.    /85   Pulse 52   Wt 85.7 kg (189 lb)   LMP  (LMP Unknown)   SpO2 96%   BMI 35.71 kg/m    Gen: well-appearing  HEENT: Mallampati III  Card: RRR  Pulm: diminished bilaterally   Abd: soft  MSK: no edema  Skin: no obvious rash  Psych: normal affect  Neuro: alert and oriented     Labs:  Personally reviewed    Imaging/Studies: Personally reviewed    Past Medical History:   Diagnosis Date      Abnormal MRI, cervical spine 10/15/2011    2011; mild changes noted. Study done for left arm symptoms Impression:  1. Mild multilevel degenerative disc disease with no significant canal or neural stenosis seen. motion artifact on the STIR images in these are not interpretable. The remaining images were interpreted      Autosomal dominant polycystic kidney disease 2011     (Problem list name updated by automated process. Provider to review and confirm.)     CMC DJD(carpometacarpal degenerative joint disease), localized primary 2013     -donor kidney transplant 2014     Gastroesophageal reflux disease      Generalized anxiety disorder 11/15/2012     Glaucoma      Hyperlipidemia 10/15/2011     Hyperparathyroidism, secondary (H) 2015     Hypertension     resolved     Immunosuppressed status (H) 2014     Major depressive disorder, recurrent episode, moderate (H) 11/15/2012     Obesity (BMI 30-39.9)      OP (osteoporosis) T score -3.8 2009 T-score -3.7      LION (obstructive sleep apnoea) 10/15/2012    reported intolerant to CPAP -- she says she doesn't have LION     Pain in joint, forearm -- L unhealed Fx 2013     Premature menopause age 37 07/10/2012    OCP (vaginal bldg)-->HT which she stopped 2 mo later documented at 2007 visit (age 51).      Restless leg syndrome      Stiffness of joint, not elsewhere classified, hand 2013     Tremor 10/15/2011    head     Type 2 DM with Neuropathy     started with gestational diabetes     Uncomplicated asthma      Past Surgical History:   Procedure Laterality Date     ABDOMEN SURGERY       ANKLE SURGERY       C/SECTION, LOW TRANSVERSE      x 2     CHOLECYSTECTOMY       COLONOSCOPY       ESOPHAGOSCOPY, GASTROSCOPY, DUODENOSCOPY (EGD), COMBINED N/A 2015    Procedure: COMBINED ESOPHAGOSCOPY, GASTROSCOPY, DUODENOSCOPY (EGD);  Surgeon: Syk Davey MD;  Location:  GI     ESOPHAGOSCOPY,  GASTROSCOPY, DUODENOSCOPY (EGD), COMBINED N/A 05/19/2015    Procedure: COMBINED ESOPHAGOSCOPY, GASTROSCOPY, DUODENOSCOPY (EGD), BIOPSY SINGLE OR MULTIPLE;  Surgeon: Sky Davey MD;  Location:  GI     EYE SURGERY       LAPAROSCOPY, SURGICAL; REPAIR INCISIONAL OR VENTRAL HERNIA       LASER SURGERY OF EYE Left 10/01/2020    sever vitreous strands     ORTHOPEDIC SURGERY       HERMINIA EN Y BOWEL  1990     WRIST SURGERY       ZZC TRANSPLANTATION OF KIDNEY  03/2014     Family History   Problem Relation Age of Onset     Mental Illness Other         family hx     Heart Disease Other      Diabetes Other      Cancer Other      Genetic Disorder Father      Mental Illness Father      Diabetes Father      Hypertension Father      Hyperlipidemia Mother      Diabetes Mother      Hypertension Mother      Mental Illness Other      Diabetes Other      Glaucoma No family hx of      Macular Degeneration No family hx of      Social History     Socioeconomic History     Marital status:      Spouse name: Rahat     Number of children: 2     Years of education: Not on file     Highest education level: Not on file   Occupational History     Comment: part-time   Tobacco Use     Smoking status: Never Smoker     Smokeless tobacco: Never Used   Substance and Sexual Activity     Alcohol use: No     Alcohol/week: 0.0 standard drinks     Drug use: No     Sexual activity: Yes     Partners: Male     Birth control/protection: None     Comment: 1 partner   Other Topics Concern     Parent/sibling w/ CABG, MI or angioplasty before 65F 55M? 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    Juan Mathias MD  Pulmonary and Critical Care Medicine  [facility]

## 2022-01-19 ENCOUNTER — TELEPHONE (OUTPATIENT)
Dept: TRANSPLANT | Facility: CLINIC | Age: 67
End: 2022-01-19
Payer: MEDICARE

## 2022-01-19 ASSESSMENT — ASTHMA QUESTIONNAIRES: ACT_TOTALSCORE: 14

## 2022-01-19 NOTE — TELEPHONE ENCOUNTER
ISSUE  Terry Spencer MD Harris, Kathleen, RN  Hi,     This is Terry the nephrology fellow. Ms. Whiting had a vomiting episode 15 minutes after taking her cyclosporin and Mycophenolic acid.She did see some pills but not sure whether it was capsule or tablet. I told her to take one pill of mycophenolic acid.And resume her normal medication from tomorrow. I think she should get levels for them in 2 days       OUTCOME  Elizabeth states she is feeling well. No more episodes of vomiting today. She is eating and drinking normally. She will go in for labs in the next few days.

## 2022-01-22 ENCOUNTER — TRANSFERRED RECORDS (OUTPATIENT)
Dept: HEALTH INFORMATION MANAGEMENT | Facility: CLINIC | Age: 67
End: 2022-01-22
Payer: MEDICARE

## 2022-01-22 LAB — EJECTION FRACTION: 70 %

## 2022-02-01 ENCOUNTER — ANCILLARY PROCEDURE (OUTPATIENT)
Dept: MAMMOGRAPHY | Facility: CLINIC | Age: 67
End: 2022-02-01
Attending: INTERNAL MEDICINE
Payer: MEDICARE

## 2022-02-01 DIAGNOSIS — Z12.31 VISIT FOR SCREENING MAMMOGRAM: ICD-10-CM

## 2022-02-01 PROCEDURE — 77067 SCR MAMMO BI INCL CAD: CPT | Mod: TC | Performed by: RADIOLOGY

## 2022-02-01 PROCEDURE — 77063 BREAST TOMOSYNTHESIS BI: CPT | Mod: TC | Performed by: RADIOLOGY

## 2022-02-03 ENCOUNTER — TELEPHONE (OUTPATIENT)
Dept: GASTROENTEROLOGY | Facility: CLINIC | Age: 67
End: 2022-02-03

## 2022-02-03 ENCOUNTER — VIRTUAL VISIT (OUTPATIENT)
Dept: INTERNAL MEDICINE | Facility: CLINIC | Age: 67
End: 2022-02-03
Payer: MEDICARE

## 2022-02-03 DIAGNOSIS — N18.5 CHRONIC KIDNEY DISEASE, STAGE V (H): ICD-10-CM

## 2022-02-03 DIAGNOSIS — R53.83 FATIGUE, UNSPECIFIED TYPE: Primary | ICD-10-CM

## 2022-02-03 DIAGNOSIS — K21.9 GASTROESOPHAGEAL REFLUX DISEASE WITHOUT ESOPHAGITIS: ICD-10-CM

## 2022-02-03 DIAGNOSIS — K83.8 COMMON BILE DUCT DILATATION: ICD-10-CM

## 2022-02-03 PROCEDURE — 99215 OFFICE O/P EST HI 40 MIN: CPT | Mod: 95 | Performed by: INTERNAL MEDICINE

## 2022-02-03 RX ORDER — ONDANSETRON 4 MG/1
4 TABLET, ORALLY DISINTEGRATING ORAL EVERY 6 HOURS PRN
Qty: 20 TABLET | Refills: 4 | Status: SHIPPED | OUTPATIENT
Start: 2022-02-03 | End: 2022-07-05

## 2022-02-03 NOTE — CONFIDENTIAL NOTE
Advanced Endoscopy     Referring provider: Horacio Sheridan MD    Referred to: [facility]     Provider Requested: none specified     Referral Received: 2/3/22     Records received: [facility]  1/21/22 CT  IMPRESSION:   1. Subsegmental pulmonary embolism in the right upper lobe. No evidence of right heart strain or increased right heart pressures.   2. Mild dilation of the common bile duct and central intrahepatic ducts. No visualized ductal stone or obstruction. Findings may be due to some component of reservoir effect from prior cholecystectomy as well as mass effect from innumerable hepatic cysts.   3. Polycystic hepatorenal disease. Unremarkable appearance of the right lower quadrant renal transplant.    US Abd 1/21/22  IMPRESSION:   1. Hepatorenal polycystic disease.   2. Biliary tract dilatation with the common bile duct measuring 0.9 cm and suggestion of some central early intrahepatic biliary dilatation. No history of cholecystectomy. Patient does have history of gastric bypass.   3. As such, would recommend GI consultation with consideration of MR ERCP or CT abdomen/pelvis with oral and IV contrast.       Images received: PACs    Evaluation for: Common bile duct dilatation, Gastroesophageal reflux disease without esophagitis Biliary tract dilatation with the common bile duct measuring 0.9 cm and suggestion of some central early intrahepatic biliary dilatation     Clinical History (per RN review):     Admission Date: 1/21/2022  Discharge Date: 1/22/2022    Hospital Course:   67 yo F, h/o polycystic kidney & liver dis, type 2 DM, depression / anxiety - who presents with severe substernal & epigastric pain since about 3 am.     Acute pulmonary embolism without acute cor pulmonale   - no risk factors identified so far. No recent illness.   - lovenox 1 mg/kg Q12 hrs started initially. Echo is at her baseline.   - d/w Dr Rivera - OK to use apixaban for anticoagulation. Cost 47$/month  acceptable for patient. She is s/p gastric bypass - reviewed with pt re chance of decreased absorption of apixaban in setting of gastric bypass. Reviewed option of warfarin including logistics. Patient prefers apixaban     Substernal CP / epigastric pain - likely due to PE + musculoskeletal  - hgb 14, BUN 23; LFTs normal, lipase normal. US shows CBD 0.9 cm. She is s/p gastric bypass & cholecystectomy > 20 years ago   - started with clear liquids & advanced diet - she is tolerating diet without symptoms. She does have tenderness to palpation on her chest wall bilaterally & likely has a component of muscular pain.         Autosomal dominant polycystic kidney & liver disease  -donor kidney transplant   - renal function stable at discharge - creatinine 0.8   - continue immunosuppressive meds - cyclosporin, mycophenolate, prednisone, SS bactrim      Type 2 diabetes mellitus with peripheral neuropathy      Generalized anxiety disorder   - continue home meds      Hypertension   - ct home meds      LION on CPAP     Obesity s/p gastric bypass  - takes topamax for appetite suppressant        MD review date:   MD Decision for clinic consultation/Orders:            Referral updates/Patient contacted:

## 2022-02-03 NOTE — PROGRESS NOTES
"SUBJECTIVE/OBJECTIVE:    Elizabeth Palma is a 66 year old female who presents to clinic today for the following health issues:    She says that she is \"not feeling well\".  When I asked her to elaborate her to give me more details, she kept saying this and was unable to elaborate.  She says she has little appetite. She is \"not doing the things she is supposed to be doing\" - she did not feel like going out with her friends. She denies depression and is excited about things.  She said she spoke with her therapist about this and is not concerned about this.  She says she is losing weight - she was 190lb and is now 188lb. She says she does not want to drink water - does not feel like it.    She said that she was told that the clots \"went away right after starting the apixaban\" and that they are gone now.  She has blood tests today to check the \"quality of her blood\".    Tremor - the propranolol has helped a little but not all of the way    Precharting: She was admitted for acute pulmonary embolism 1/21 after presenting with severe substernal and epigastric pain which was work with deep breathing.  She was started on enoxaparin and then transitioned to apixapan 10mg twice daily x 7 days and then 5mg twice daily after that      The following have been reviewed:  Medication list  Allergies  Past medical/surgical history  Family history    ROS:   Gen - no fever  Resp - she has some shortness of breath  GI - she has nausea and some upset stomach - this has been there x 2 days; no change in bowel movements (maybe looser)   - no changes  Extrem - very little edema  Skin - no rash    Exam: She appeared to be breathing normally without any gasping or obvious distress.    CT angio chest / CT abd : IMPRESSION:  1. Subsegmental pulmonary embolism in the right upper lobe. No evidence of right heart strain or increased right heart pressures.  2. Mild dilation of the common bile duct and central intrahepatic ducts. No visualized " "ductal stone or obstruction. Findings may be due to some component of reservoir effect from prior cholecystectomy as well as mass effect from innumerable hepatic cysts.  3. Polycystic hepatorenal disease. Unremarkable appearance of the right lower quadrant renal transplant.    RUQ US: IMPRESSION:   1. Hepatorenal polycystic disease.  2. Biliary tract dilatation with the common bile duct measuring 0.9 cm and suggestion of some central early intrahepatic biliary dilatation. No history of cholecystectomy. Patient does have history of gastric bypass.  3. As such, would recommend GI consultation with consideration of MR ERCP or CT abdomen/pelvis with oral and IV contrast.    Echo: CONCLUSIONS  1. Left ventricular chamber size is normal. Mild concentric left ventricular hypertrophy. Global and regional left ventricular function is hyperdynamic. Left ventricular ejection fraction is visually estimated at 70%. Normal left ventricular diastolic function.  2. Normal right ventricle size and normal global function.  3. No significant valve pathology was seen.  4. The visualized segments of the thoracic aorta are normal in diameter. There is calcification at the sinotubular junction.  5. The inferior vena cava is normal suggesting normal RA pressure.  6. There is a trivial pericardial effusion that is not hemodynamically significant. Epicardial fat is noted.  7. Compared to the report of the echocardiogram done at [facility] on 9/10/20, there has been no significant change.      ASSESSMENT/PLAN:   Feeling of \"unwell\" - unfortunately she was unable to be more specific about what she is feeling.  A review of systems did not add a lot to the differential for this.  Personally I would be very surprised if her pulmonary emboli are gone after 2 weeks of treatment.  The data I was able to find quickly after the visit indicates that a fairly high percentage of people have residual pulmonary emboli for long periods of time " afterwards (87% at 8 days, 68% at 6 weeks, 65% at 3 months, and 52% after 11 months. Resolution of Thromboemboli in Patients With Acute Pulmonary Embolism: A Systematic Review, Chest, Volume 129, Issue 1, 192-197, 2006).  Therefore, my biggest suspicion is that she is feeling the after effects of having multiple clots in her lungs.  In the absence of more specific symptoms or changes I will get some screening labs such as a CBC, CMP, and thyroid testing.  She is not eating or drinking well so I did tell her she needs to focus on drinking more water.  She may also need some time for either something to become more obvious as to why she is feeling like she is feeling or for it to resolve on its own.    GI referral for biliary tract dilatation with the common bile duct measuring 0.9 cm and suggestion of some central early intrahepatic biliary dilatation    GERD - She has heart burn for which she is on protonix and tums helps a little bit.  This may also be something she could bring up with the GI specialist.    Tremor - likely needs a higher dose of the propranolol since this was just started recently.  However in the context of her current complaint of not feeling well I am reluctant to make a change to medication which has is that side effect fatigue.    Number and complexity of problems:  1 unstable chronic illness or with exacerbation (tremor)  1 undiagnosed new problem  1 acute illness with systemic symptoms (pulmonary emboli)    Amount and Complexity of Data Reviewed/Analyzed:  1 External notes reviewed  3 Unique test reviewed  3 Unique test ordered    Risk of complication/morbidity of patient management:  Patient receiving prescription management    5 minutes precharting, 24 minutes for the visit and 18 min on charting and updating records for a total of 47 minutes spent on the date of the encounter doing chart review, history and exam, documentation and further activities as noted above    Video/Telephone visit  start time: 9:32a 6  Video/Telephone visit end time: 9:56a    Originating Location (pt. Location): Home    Distant Location (provider location):  [facility] [place]     Mode of Communication:  Video Conference via Claudia Sheridan MD, FACP

## 2022-02-04 ENCOUNTER — PATIENT OUTREACH (OUTPATIENT)
Dept: GASTROENTEROLOGY | Facility: CLINIC | Age: 67
End: 2022-02-04
Payer: MEDICARE

## 2022-02-04 DIAGNOSIS — R93.3 ABNORMAL FINDING ON GI TRACT IMAGING: Primary | ICD-10-CM

## 2022-02-04 NOTE — PROGRESS NOTES
Called pt to discuss referral and Dr Joya's recommendations, left VM    Will need an MRI/MRCP and LFTs then clinic visit.    Khushboo Barr, RN, BSN,   [facility]  Care coordinator

## 2022-02-07 ENCOUNTER — TELEPHONE (OUTPATIENT)
Dept: MULTI SPECIALTY CLINIC | Facility: CLINIC | Age: 67
End: 2022-02-07
Payer: MEDICARE

## 2022-02-07 ENCOUNTER — TELEPHONE (OUTPATIENT)
Dept: TRANSPLANT | Facility: CLINIC | Age: 67
End: 2022-02-07
Payer: MEDICARE

## 2022-02-07 DIAGNOSIS — Z48.298 AFTERCARE FOLLOWING ORGAN TRANSPLANT: ICD-10-CM

## 2022-02-07 DIAGNOSIS — Z48.22 ENCOUNTER FOR AFTERCARE FOLLOWING KIDNEY TRANSPLANT: ICD-10-CM

## 2022-02-07 DIAGNOSIS — Z94.0 KIDNEY TRANSPLANTED: Primary | ICD-10-CM

## 2022-02-07 NOTE — TELEPHONE ENCOUNTER
Patient Call: General  Route to LPN    Reason for call: Rahat called to speak to coordinator about wife being ill. She went to ER and now is home. She wasn't able to take transplant meds until 2 pm today. They are wondering when she should be taking evening transplant meds and discuss symptoms.         Call back needed? Yes  Return Call Needed  Same as documented in contacts section

## 2022-02-07 NOTE — CONFIDENTIAL NOTE
Paged ~ 5:30 regarding Ms. Celaya. She is s/p DDKT in 2014 for polycystic kidney disease.    Apparently she was seen in the emergency department at [facility] this morning regarding persistent nausea and brown emesis. She tells me that she was worked up at the ED and then discharged home. She and her  report that due to her ED visit she was unable to take her immune suppression medications and therefore she took them as soon as she returned home, ~2:30 PM. They now ask when she should next take her immune suppression.    She tells me that at this time her nausea is resolved but that she is quite anxious. She denies any other current symptoms.    I cannot see the encounter note in Care Everywhere at this time, but I am able to evaluate the labs (note that as of this note being written the pended encounter at [facility] is recorded as occurring in June 2022 but I assume that before completion this will be corrected to today's date). Cr is baseline, urine is quite concentrated on UA but otherwise unremarkable. CT A/P read unremarkable other than prior known PKD. EKG apparently unchanged from prior and unconcerning, I cannot review this personally. I can't see that covid was tested but she denies covid symptoms and CXR was read as clear.    I recommended that since she has already taken an afternoon dose of her immune suppression and since she is 8 years out from transplant, that she take her medications tomorrow AM and return to her normal schedule at that point. She and her  have already reached out to her transplant coordinator, and will follow up per usual. I have also recommended that Mrs. Celaya stay well hydrated, especially in the setting of her nausea and vomiting should that recur.

## 2022-02-08 ENCOUNTER — OFFICE VISIT (OUTPATIENT)
Dept: PODIATRY | Facility: CLINIC | Age: 67
End: 2022-02-08
Payer: MEDICARE

## 2022-02-08 VITALS — OXYGEN SATURATION: 94 % | HEART RATE: 50 BPM

## 2022-02-08 DIAGNOSIS — E11.49 TYPE II OR UNSPECIFIED TYPE DIABETES MELLITUS WITH NEUROLOGICAL MANIFESTATIONS, NOT STATED AS UNCONTROLLED(250.60) (H): Primary | ICD-10-CM

## 2022-02-08 DIAGNOSIS — L60.2 ONYCHAUXIS: ICD-10-CM

## 2022-02-08 DIAGNOSIS — E11.69 TYPE 2 DIABETES MELLITUS WITH DIABETIC FOOT DEFORMITY (H): ICD-10-CM

## 2022-02-08 DIAGNOSIS — M21.969 TYPE 2 DIABETES MELLITUS WITH DIABETIC FOOT DEFORMITY (H): ICD-10-CM

## 2022-02-08 PROCEDURE — 11719 TRIM NAIL(S) ANY NUMBER: CPT | Performed by: PODIATRIST

## 2022-02-08 PROCEDURE — 99213 OFFICE O/P EST LOW 20 MIN: CPT | Mod: 25 | Performed by: PODIATRIST

## 2022-02-08 NOTE — TELEPHONE ENCOUNTER
ISSUE: Patient c/o diarrhea for a couple weeks. Constant heartburn that gets worse after taking medications.  Patient seen in the ED at [facility] yesterday 2/7 after vomiting for 1.5 days.  Vomiting has now subsided.  Patient states she throws up every time she takes her omeprazole.      Diarrhea 1-2 per day.  BP: 110-130/70s, HR: 50-60, wt:186, down 3-4 pound in 3 days. Stable UOP- no change. No S/S of UTI.    New medications:  eliqius started ~3 weeks ago after dx of PE. By Dr. Owen at [facility]  Around this time is when patient states she began to not feel well.    Marivel Marcelo RN   Transplant Coordinator  [phone]      ADDENDUM:  Per Dr. Jimenez:  Obtain stool studies and MPA level    Patient v/u of new orders and is aware to have labs done.    Orders placed.    Marivel Marcelo RN   Transplant Coordinator  [phone]

## 2022-02-08 NOTE — NURSING NOTE
Elizabeth Palma's chief complaint for this visit includes:  Chief Complaint   Patient presents with     Follow Up     Diabetic foot care     PCP: Horacio Sheridan    Referring Provider:  No referring provider defined for this encounter.    LMP  (LMP Unknown)   Data Unavailable     Do you need any medication refills at today's visit?

## 2022-02-08 NOTE — PROGRESS NOTES
Past Medical History:   Diagnosis Date     Abnormal MRI, cervical spine 10/15/2011    2011; mild changes noted. Study done for left arm symptoms Impression:  1. Mild multilevel degenerative disc disease with no significant canal or neural stenosis seen. motion artifact on the STIR images in these are not interpretable. The remaining images were interpreted      Autosomal dominant polycystic kidney disease 2011     (Problem list name updated by automated process. Provider to review and confirm.)     CMC DJD(carpometacarpal degenerative joint disease), localized primary 2013     -donor kidney transplant 2014     Gastroesophageal reflux disease      Generalized anxiety disorder 11/15/2012     Glaucoma      Hyperlipidemia 10/15/2011     Hyperparathyroidism, secondary (H) 2015     Hypertension     resolved     Immunosuppressed status (H) 2014     Major depressive disorder, recurrent episode, moderate (H) 11/15/2012     Obesity (BMI 30-39.9)      OP (osteoporosis) T score -3.8 2009 T-score -3.7      LION (obstructive sleep apnoea) 10/15/2012    reported intolerant to CPAP -- she says she doesn't have LION     Pain in joint, forearm -- L unhealed Fx 2013     Premature menopause age 37 07/10/2012    OCP (vaginal bldg)-->HT which she stopped 2 mo later documented at 2007 visit (age 51).      Restless leg syndrome      Stiffness of joint, not elsewhere classified, hand 2013     Tremor 10/15/2011    head     Type 2 DM with Neuropathy 1985    started with gestational diabetes     Uncomplicated asthma      Patient Active Problem List   Diagnosis     Hypertension     Hyperlipidemia     Abnormal MRI, cervical spine     Sensory loss     Premature menopause age 37     OP (osteoporosis) T score -3.8     Major depressive disorder, recurrent episode, moderate (H)     Generalized anxiety disorder     CMC DJD(carpometacarpal degenerative joint disease), localized  primary     Pain in joint, forearm -- L unhealed Fx     -donor kidney transplant     Immunosuppressed status (H)     Hyperparathyroidism, secondary (H)     Hyperparathyroidism (H)     Senile osteoporosis     Pain in joint involving ankle and foot     Nightmares associated with chronic post-traumatic stress disorder     DM type 2 with Neuropathy, Hgb A1C 7.3 on 21     Posttraumatic stress disorder     Plantar fasciitis, bilateral     Right knee pain     Age-related osteoporosis without current pathological fracture     Narcissistic personality disorder (H)     Personal history of other drug therapy     Median sensory neuropathy, left     Marital conflict     Suicidal ideation     Autosomal dominant polycystic kidney disease     Secondary hyperparathyroidism (H)     Anemia, iron deficiency     Morbid obesity (H)     Chronic kidney disease, stage 3 (H)     PCKD, S/P Renal Transplant  ([facility])     Intractable back pain     UTI     Bacteremia     Chronic kidney disease, stage V (H)     Past Surgical History:   Procedure Laterality Date     ABDOMEN SURGERY       ANKLE SURGERY       C/SECTION, LOW TRANSVERSE      x 2     CHOLECYSTECTOMY       COLONOSCOPY       ESOPHAGOSCOPY, GASTROSCOPY, DUODENOSCOPY (EGD), COMBINED N/A 2015    Procedure: COMBINED ESOPHAGOSCOPY, GASTROSCOPY, DUODENOSCOPY (EGD);  Surgeon: Sky Davey MD;  Location:  GI     ESOPHAGOSCOPY, GASTROSCOPY, DUODENOSCOPY (EGD), COMBINED N/A 2015    Procedure: COMBINED ESOPHAGOSCOPY, GASTROSCOPY, DUODENOSCOPY (EGD), BIOPSY SINGLE OR MULTIPLE;  Surgeon: Sky Davey MD;  Location:  GI     EYE SURGERY       LAPAROSCOPY, SURGICAL; REPAIR INCISIONAL OR VENTRAL HERNIA       LASER SURGERY OF EYE Left 10/01/2020    sever vitreous strands     ORTHOPEDIC SURGERY       HERMINIA EN Y BOWEL       WRIST SURGERY       [facility] TRANSPLANTATION OF KIDNEY  2014     Social History     Socioeconomic History     Marital status:       Spouse name: Rahat     Number of children: 2     Years of education: Not on file     Highest education level: Not on file   Occupational History     Comment: part-time   Tobacco Use     Smoking status: Never Smoker     Smokeless tobacco: Never Used   Substance and Sexual Activity     Alcohol use: No     Alcohol/week: 0.0 standard drinks     Drug use: No     Sexual activity: Yes     Partners: Male     Birth control/protection: None     Comment: 1 partner   Other Topics Concern     Parent/sibling w/ CABG, MI or angioplasty before 65F 55M? 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)      Social Determinants of Health     Financial Resource Strain: Not on file   Food Insecurity: Not on file   Transportation Needs: Not on file   Physical Activity: Not on file   Stress: Not on file   Social Connections: Not on file   Intimate Partner Violence: Not on file   Housing Stability: Not on file     Family History   Problem Relation Age of Onset     Mental Illness Other         family hx     Heart Disease Other      Diabetes Other      Cancer Other      Genetic Disorder Father      Mental Illness Father      Diabetes Father      Hypertension Father      Hyperlipidemia Mother      Diabetes Mother      Hypertension Mother      Mental Illness Other      Diabetes Other      Glaucoma No family hx of      Macular Degeneration No family hx of      Lab Results   Component Value Date    A1C 6.9 06/19/2021    A1C 7.3 01/25/2021    A1C 6.7 09/03/2020    A1C 7.1 12/17/2019    A1C 7.2 04/24/2019 2/07/22 cbc and bmp reviewed as in emr.      SUBJECTIVE FINDINGS:  This 66-year-old female returns to clinic for diabetic foot check and onychauxis, onychocryptosis. She relates she is getting pain in toenails. Relates no ulcers or sores since we have seen her last, no injuries. She relates she is wearing diabetic shoes. She relates she does have numbness and tingling in  her feet.  She relates otherwise is doing well and is using and moisterizing lotion on feet.       OBJECTIVE FINDINGS:  DP and PT are 2/4 bilaterally. She has decreased ankle joint dorsiflexion bilaterally. She has dorsally contracted digits bilaterally 1-5. She has flexible flat foot with dorsal medial first MPJ prominence. Deep tendon reflexes are intact bilaterally. Sharp/dull is intact with 5.07 Perris-Yaneth monofilament bilaterally. There is no erythema, no drainage, no odor, no calor bilaterally. She has incurvated nails 1-5 bilaterally to differing degrees.  She has some thickening, dystrophy and subungual debris of toenails bilaterally.      ASSESSMENT AND PLAN: Onychauxis and Onychomycosis bilaterally causing pain. Diabetes with peripheral neuropathy and has Hammertoes present. Diagnosis and treatment discussed with her. All the nails were reduced or debrided bilaterally upon consent.  Continue Diabetic shoes.  Return to clinic to see me in about 2-3 months.  Previous notes reviewed.      Moderate level of medical decision making with Number and Complexity of  Problems Addressed- moderate, amount and/or complexity of data to be reviewed and analyzed- moderate, and Risk of Complications and/or  Morbidity or Mortality of  Patient Management- moderate.

## 2022-02-08 NOTE — LETTER
2022         RE: Elizabeth Palma  [address]  [address]  [facility] [address]        Dear Colleague,    Thank you for referring your patient, Elizabeth Palma, to the [facility]. Please see a copy of my visit note below.    Past Medical History:   Diagnosis Date     Abnormal MRI, cervical spine 10/15/2011    2011; mild changes noted. Study done for left arm symptoms Impression:  1. Mild multilevel degenerative disc disease with no significant canal or neural stenosis seen. motion artifact on the STIR images in these are not interpretable. The remaining images were interpreted      Autosomal dominant polycystic kidney disease 2011     (Problem list name updated by automated process. Provider to review and confirm.)     CMC DJD(carpometacarpal degenerative joint disease), localized primary 2013     -donor kidney transplant 2014     Gastroesophageal reflux disease      Generalized anxiety disorder 11/15/2012     Glaucoma      Hyperlipidemia 10/15/2011     Hyperparathyroidism, secondary (H) 2015     Hypertension     resolved     Immunosuppressed status (H) 2014     Major depressive disorder, recurrent episode, moderate (H) 11/15/2012     Obesity (BMI 30-39.9)      OP (osteoporosis) T score -3.8 2009 T-score -3.7      LION (obstructive sleep apnoea) 10/15/2012    reported intolerant to CPAP -- she says she doesn't have LION     Pain in joint, forearm -- L unhealed Fx 2013     Premature menopause age 37 07/10/2012    OCP (vaginal bldg)-->HT which she stopped 2 mo later documented at 2007 visit (age 51).      Restless leg syndrome      Stiffness of joint, not elsewhere classified, hand 2013     Tremor 10/15/2011    head     Type 2 DM with Neuropathy 1985    started with gestational diabetes     Uncomplicated asthma      Patient Active Problem List   Diagnosis     Hypertension     Hyperlipidemia     Abnormal  MRI, cervical spine     Sensory loss     Premature menopause age 37     OP (osteoporosis) T score -3.8     Major depressive disorder, recurrent episode, moderate (H)     Generalized anxiety disorder     CMC DJD(carpometacarpal degenerative joint disease), localized primary     Pain in joint, forearm -- L unhealed Fx     -donor kidney transplant     Immunosuppressed status (H)     Hyperparathyroidism, secondary (H)     Hyperparathyroidism (H)     Senile osteoporosis     Pain in joint involving ankle and foot     Nightmares associated with chronic post-traumatic stress disorder     DM type 2 with Neuropathy, Hgb A1C 7.3 on 21     Posttraumatic stress disorder     Plantar fasciitis, bilateral     Right knee pain     Age-related osteoporosis without current pathological fracture     Narcissistic personality disorder (H)     Personal history of other drug therapy     Median sensory neuropathy, left     Marital conflict     Suicidal ideation     Autosomal dominant polycystic kidney disease     Secondary hyperparathyroidism (H)     Anemia, iron deficiency     Morbid obesity (H)     Chronic kidney disease, stage 3 (H)     PCKD, S/P Renal Transplant  ([facility])     Intractable back pain     UTI     Bacteremia     Chronic kidney disease, stage V (H)     Past Surgical History:   Procedure Laterality Date     ABDOMEN SURGERY       ANKLE SURGERY       C/SECTION, LOW TRANSVERSE      x 2     CHOLECYSTECTOMY       COLONOSCOPY       ESOPHAGOSCOPY, GASTROSCOPY, DUODENOSCOPY (EGD), COMBINED N/A 2015    Procedure: COMBINED ESOPHAGOSCOPY, GASTROSCOPY, DUODENOSCOPY (EGD);  Surgeon: Sky Davey MD;  Location:  GI     ESOPHAGOSCOPY, GASTROSCOPY, DUODENOSCOPY (EGD), COMBINED N/A 2015    Procedure: COMBINED ESOPHAGOSCOPY, GASTROSCOPY, DUODENOSCOPY (EGD), BIOPSY SINGLE OR MULTIPLE;  Surgeon: Sky Davey MD;  Location:  GI     EYE SURGERY       LAPAROSCOPY, SURGICAL; REPAIR INCISIONAL OR  VENTRAL HERNIA       LASER SURGERY OF EYE Left 10/01/2020    sever vitreous strands     ORTHOPEDIC SURGERY       HERMINIA EN Y BOWEL  1990     WRIST SURGERY       [facility] TRANSPLANTATION OF KIDNEY  03/2014     Social History     Socioeconomic History     Marital status:      Spouse name: Rahat     Number of children: 2     Years of education: Not on file     Highest education level: Not on file   Occupational History     Comment: part-time   Tobacco Use     Smoking status: Never Smoker     Smokeless tobacco: Never Used   Substance and Sexual Activity     Alcohol use: No     Alcohol/week: 0.0 standard drinks     Drug use: No     Sexual activity: Yes     Partners: Male     Birth control/protection: None     Comment: 1 partner   Other Topics Concern     Parent/sibling w/ CABG, MI or angioplasty before 65F 55M? 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)      Social Determinants of Health     Financial Resource Strain: Not on file   Food Insecurity: Not on file   Transportation Needs: Not on file   Physical Activity: Not on file   Stress: Not on file   Social Connections: Not on file   Intimate Partner Violence: Not on file   Housing Stability: Not on file     Family History   Problem Relation Age of Onset     Mental Illness Other         family hx     Heart Disease Other      Diabetes Other      Cancer Other      Genetic Disorder Father      Mental Illness Father      Diabetes Father      Hypertension Father      Hyperlipidemia Mother      Diabetes Mother      Hypertension Mother      Mental Illness Other      Diabetes Other      Glaucoma No family hx of      Macular Degeneration No family hx of      Lab Results   Component Value Date    A1C 6.9 06/19/2021    A1C 7.3 01/25/2021    A1C 6.7 09/03/2020    A1C 7.1 12/17/2019    A1C 7.2 04/24/2019 2/07/22 cbc and bmp reviewed as in emr.      SUBJECTIVE FINDINGS:  This 66-year-old female returns to clinic for  diabetic foot check and onychauxis, onychocryptosis. She relates she is getting pain in toenails. Relates no ulcers or sores since we have seen her last, no injuries. She relates she is wearing diabetic shoes. She relates she does have numbness and tingling in her feet.  She relates otherwise is doing well and is using and moisterizing lotion on feet.       OBJECTIVE FINDINGS:  DP and PT are 2/4 bilaterally. She has decreased ankle joint dorsiflexion bilaterally. She has dorsally contracted digits bilaterally 1-5. She has flexible flat foot with dorsal medial first MPJ prominence. Deep tendon reflexes are intact bilaterally. Sharp/dull is intact with 5.07 Austin-Yaneth monofilament bilaterally. There is no erythema, no drainage, no odor, no calor bilaterally. She has incurvated nails 1-5 bilaterally to differing degrees.  She has some thickening, dystrophy and subungual debris of toenails bilaterally.      ASSESSMENT AND PLAN: Onychauxis and Onychomycosis bilaterally causing pain. Diabetes with peripheral neuropathy and has Hammertoes present. Diagnosis and treatment discussed with her. All the nails were reduced or debrided bilaterally upon consent.  Continue Diabetic shoes.  Return to clinic to see me in about 2-3 months.  Previous notes reviewed.      Moderate level of medical decision making with Number and Complexity of  Problems Addressed- moderate, amount and/or complexity of data to be reviewed and analyzed- moderate, and Risk of Complications and/or  Morbidity or Mortality of  Patient Management- moderate.      Again, thank you for allowing me to participate in the care of your patient.        Sincerely,        Javier Tuttle DPM

## 2022-02-10 ENCOUNTER — LAB (OUTPATIENT)
Dept: LAB | Facility: CLINIC | Age: 67
End: 2022-02-10
Payer: MEDICARE

## 2022-02-10 DIAGNOSIS — Z48.298 AFTERCARE FOLLOWING ORGAN TRANSPLANT: ICD-10-CM

## 2022-02-10 DIAGNOSIS — Z48.22 ENCOUNTER FOR AFTERCARE FOLLOWING KIDNEY TRANSPLANT: ICD-10-CM

## 2022-02-10 DIAGNOSIS — Z94.0 KIDNEY TRANSPLANTED: ICD-10-CM

## 2022-02-10 DIAGNOSIS — R53.83 FATIGUE, UNSPECIFIED TYPE: ICD-10-CM

## 2022-02-10 LAB
BASOPHILS # BLD AUTO: 0 10E3/UL (ref 0–0.2)
BASOPHILS NFR BLD AUTO: 1 %
EOSINOPHIL # BLD AUTO: 0.1 10E3/UL (ref 0–0.7)
EOSINOPHIL NFR BLD AUTO: 2 %
ERYTHROCYTE [DISTWIDTH] IN BLOOD BY AUTOMATED COUNT: 14.6 % (ref 10–15)
HCT VFR BLD AUTO: 43 % (ref 35–47)
HGB BLD-MCNC: 14.2 G/DL (ref 11.7–15.7)
LYMPHOCYTES # BLD AUTO: 0.5 10E3/UL (ref 0.8–5.3)
LYMPHOCYTES NFR BLD AUTO: 11 %
MCH RBC QN AUTO: 29.9 PG (ref 26.5–33)
MCHC RBC AUTO-ENTMCNC: 33 G/DL (ref 31.5–36.5)
MCV RBC AUTO: 91 FL (ref 78–100)
MONOCYTES # BLD AUTO: 0.4 10E3/UL (ref 0–1.3)
MONOCYTES NFR BLD AUTO: 9 %
NEUTROPHILS # BLD AUTO: 3.7 10E3/UL (ref 1.6–8.3)
NEUTROPHILS NFR BLD AUTO: 78 %
PLATELET # BLD AUTO: 174 10E3/UL (ref 150–450)
RBC # BLD AUTO: 4.75 10E6/UL (ref 3.8–5.2)
WBC # BLD AUTO: 4.8 10E3/UL (ref 4–11)

## 2022-02-10 PROCEDURE — 80053 COMPREHEN METABOLIC PANEL: CPT

## 2022-02-10 PROCEDURE — 80180 DRUG SCRN QUAN MYCOPHENOLATE: CPT

## 2022-02-10 PROCEDURE — 85025 COMPLETE CBC W/AUTO DIFF WBC: CPT

## 2022-02-10 PROCEDURE — 84443 ASSAY THYROID STIM HORMONE: CPT

## 2022-02-10 PROCEDURE — 36415 COLL VENOUS BLD VENIPUNCTURE: CPT

## 2022-02-11 ENCOUNTER — TELEPHONE (OUTPATIENT)
Dept: TRANSPLANT | Facility: CLINIC | Age: 67
End: 2022-02-11
Payer: MEDICARE

## 2022-02-11 LAB
ALBUMIN SERPL-MCNC: 3.5 G/DL (ref 3.4–5)
ALP SERPL-CCNC: 52 U/L (ref 40–150)
ALT SERPL W P-5'-P-CCNC: 34 U/L (ref 0–50)
ANION GAP SERPL CALCULATED.3IONS-SCNC: 6 MMOL/L (ref 3–14)
AST SERPL W P-5'-P-CCNC: 8 U/L (ref 0–45)
BILIRUB SERPL-MCNC: 0.5 MG/DL (ref 0.2–1.3)
BUN SERPL-MCNC: 14 MG/DL (ref 7–30)
C COLI+JEJUNI+LARI FUSA STL QL NAA+PROBE: NOT DETECTED
C DIFF TOX B STL QL: NEGATIVE
CALCIUM SERPL-MCNC: 9.7 MG/DL (ref 8.5–10.1)
CHLORIDE BLD-SCNC: 106 MMOL/L (ref 94–109)
CMV DNA SPEC NAA+PROBE-ACNC: NOT DETECTED IU/ML
CO2 SERPL-SCNC: 27 MMOL/L (ref 20–32)
CREAT SERPL-MCNC: 1.21 MG/DL (ref 0.52–1.04)
EC STX1 GENE STL QL NAA+PROBE: NOT DETECTED
EC STX2 GENE STL QL NAA+PROBE: NOT DETECTED
GFR SERPL CREATININE-BSD FRML MDRD: 50 ML/MIN/1.73M2
GLUCOSE BLD-MCNC: 176 MG/DL (ref 70–99)
MYCOPHENOLATE SERPL LC/MS/MS-MCNC: 5.07 MG/L (ref 1–3.5)
MYCOPHENOLATE-G SERPL LC/MS/MS-MCNC: >200 MG/L (ref 30–95)
NOROV GI+II ORF1-ORF2 JNC STL QL NAA+PR: NOT DETECTED
POTASSIUM BLD-SCNC: 4.1 MMOL/L (ref 3.4–5.3)
PROT SERPL-MCNC: 6.3 G/DL (ref 6.8–8.8)
RVA NSP5 STL QL NAA+PROBE: NOT DETECTED
SALMONELLA SP RPOD STL QL NAA+PROBE: NOT DETECTED
SHIGELLA SP+EIEC IPAH STL QL NAA+PROBE: NOT DETECTED
SODIUM SERPL-SCNC: 139 MMOL/L (ref 133–144)
TME LAST DOSE: ABNORMAL H
TME LAST DOSE: ABNORMAL H
TSH SERPL DL<=0.005 MIU/L-ACNC: 0.92 MU/L (ref 0.4–4)
V CHOL+PARA RFBL+TRKH+TNAA STL QL NAA+PR: NOT DETECTED
Y ENTERO RECN STL QL NAA+PROBE: NOT DETECTED

## 2022-02-11 PROCEDURE — 87493 C DIFF AMPLIFIED PROBE: CPT | Mod: 59

## 2022-02-11 PROCEDURE — 87506 IADNA-DNA/RNA PROBE TQ 6-11: CPT

## 2022-02-12 ENCOUNTER — HEALTH MAINTENANCE LETTER (OUTPATIENT)
Age: 67
End: 2022-02-12

## 2022-02-13 DIAGNOSIS — G25.0 BENIGN ESSENTIAL TREMOR: ICD-10-CM

## 2022-02-15 NOTE — PROGRESS NOTES
Called pt to discuss referral and Dr Joya's recommendations. Pt agreeable to MRCP and video visit, arranged for March 9th at 8:20am. [facility] labs including [facility]FT's.  Pt transferred to imaging scheduling. Message route to clinic coordinators to schedule visit

## 2022-02-16 NOTE — TELEPHONE ENCOUNTER
PROPRANOLOL 20MG TABLETS      Last Written Prescription Date:  1-13-22  Last Fill Quantity: 90,   # refills: 3  Last Office Visit : 1-13-22  Future Office visit:  2-25-22    Last clinic note:As a trial I recommended propranolol 20mg TID to see if this may reduce the severity   of the tremor but also make a referral to a tremor specialist within neurology.    Routing refill request to provider for review/approval because:  Pt requesting 90 day supply    Trial med, / OK to disp 90 day

## 2022-02-17 ENCOUNTER — TELEPHONE (OUTPATIENT)
Dept: TRANSPLANT | Facility: CLINIC | Age: 67
End: 2022-02-17
Payer: MEDICARE

## 2022-02-17 DIAGNOSIS — Z48.298 AFTERCARE FOLLOWING ORGAN TRANSPLANT: ICD-10-CM

## 2022-02-17 DIAGNOSIS — Z94.0 KIDNEY TRANSPLANTED: ICD-10-CM

## 2022-02-17 RX ORDER — PROPRANOLOL HYDROCHLORIDE 20 MG/1
TABLET ORAL
Qty: 270 TABLET | Refills: 1 | Status: SHIPPED | OUTPATIENT
Start: 2022-02-17 | End: 2022-02-25

## 2022-02-17 NOTE — TELEPHONE ENCOUNTER
ISSUE: diarrhea 1-2 times per day.     Stool studies negative, MPA level 5.07 on 900mg bid. MPA increased 7/2021 due to low level.    PLAN:  Francisco Jordan MD Sveiven, Marivel, RN  Please decrease to 720mg bid, recheck MPA level 1-2 weeks later. If diarrhea doesn't improve would check stool studies (enteric panel and c.diff)     OUTCOME:  Left voice message and sent detailed my chart.    Marivel Marcelo RN   Transplant Coordinator  [phone]

## 2022-02-18 RX ORDER — MYCOPHENOLIC ACID 180 MG/1
720 TABLET, DELAYED RELEASE ORAL 2 TIMES DAILY
Qty: 240 TABLET | Refills: 11 | Status: SHIPPED | OUTPATIENT
Start: 2022-02-18 | End: 2022-02-22

## 2022-02-22 DIAGNOSIS — Z48.298 AFTERCARE FOLLOWING ORGAN TRANSPLANT: ICD-10-CM

## 2022-02-22 DIAGNOSIS — Z94.0 KIDNEY TRANSPLANTED: Primary | ICD-10-CM

## 2022-02-22 RX ORDER — MYCOPHENOLIC ACID 180 MG/1
720 TABLET, DELAYED RELEASE ORAL 2 TIMES DAILY
Qty: 720 TABLET | Refills: 3 | Status: SHIPPED | OUTPATIENT
Start: 2022-02-22 | End: 2022-02-28

## 2022-02-25 ENCOUNTER — VIRTUAL VISIT (OUTPATIENT)
Dept: INTERNAL MEDICINE | Facility: CLINIC | Age: 67
End: 2022-02-25
Payer: MEDICARE

## 2022-02-25 DIAGNOSIS — G25.0 BENIGN ESSENTIAL TREMOR: ICD-10-CM

## 2022-02-25 PROCEDURE — 99214 OFFICE O/P EST MOD 30 MIN: CPT | Mod: 95 | Performed by: INTERNAL MEDICINE

## 2022-02-25 RX ORDER — PROPRANOLOL HYDROCHLORIDE 20 MG/1
TABLET ORAL
Qty: 360 TABLET | Refills: 1 | Status: SHIPPED | OUTPATIENT
Start: 2022-02-25 | End: 2022-04-26

## 2022-02-25 NOTE — NURSING NOTE
Elizabeth Palma is a 66 year old female patient that presents today in clinic for the following:    Chief Complaint   Patient presents with     RECHECK     propanolol adjustment     The patient's allergies and medications were reviewed as noted. A set of vitals were recorded as noted without incident. The patient does not have any other questions for the provider.    Jay Lazar, EMT at 12:48 PM on 2/25/2022

## 2022-02-25 NOTE — PROGRESS NOTES
"SUBJECTIVE/OBJECTIVE:    Elizabeth Palma is a 66 year old female who presents to clinic today for the following health issues:    Today, she is doing a lot better.  She had been in the hospital as detailed below.  The symptoms she was having are gone now. She has had a decrease in the dose of mycophenolate which may have been the cause of her symptoms. She still has a lot of anxiety, which is what brought her to the hospital at the beginning of February, but attributes it to getting ready to move to [place] this summer to be closer to her son. There are a lot of challenges with this.    She is concerned about a \"low level of a test\".  She was referring to the low lymphocyte count on her CBC.  The level was 0.5 when normal goes down to 0.8.  The white blood count and all other indices and differentials were normal.    She will have a myoclonic jerk. Her arm will shudder real quick.  It will happen very frequently.     She was seen in the ER on 2/7 for nausea, vomiting and nonbloody diarrhea.  She also had some mid sternal chest tightness and some weakness.  Testing including a CT scan of her abdomen was normal.  Exam showed lower abdominal tenderness.  She got some IV fluids and ondansetron and her symptoms got better. The presumptive diagnosis was gastroenteritis.    She was also seen in the ER on 2/3 for anxiety and agitation.  There was concern because of some memory lapse (could not remember her phone password) so 911 was called.  She was given Ativan and got better and was back to normal. She was diagnosed with anxiety.    I had see her that same morning and she was very vague in her symptoms and was only able to say \"not feeling well\"      She had been admitted 1/21-1/22 for a new pulmonary embolism   The following have been reviewed:  Medication list  Allergies  Past medical/surgical history      ASSESSMENT/PLAN:   Anxiety -unfortunately a bit of this is unavoidable because of these strains of moving that are " coming up for her.    Low lymphocyte absolute count -I explained to her that in the absence of other abnormalities on the blood count or differential that this low level is not meaningful.    Tremor and now with myoclonic jerks - likely needs a higher dose of the propranolol since this was just started recently.  Her HR is low in the 50s.  I will increase 40mg in the morning and keep it at 20mg in the afternoon and 20mg in the evening.  Hopefully this will mitigate her blood pressure from going too low but also help to control the tremors and jerks.    COVID - she should get a second booster (4th shot) and will go to [facility] to get this.  She qualifies for this because of her history of solid organ transplant.    10 minutes precharting, 12 minutes for the visit and 10 min on charting and updating records for a total of 32 minutes spent on the date of the encounter doing chart review, history and exam, documentation and further activities as noted above    Video visit start time: 1:02p  Video visit end time: 1:19p    Originating Location (pt. Location): Home    Distant Location (provider location):  [facility] INTERNAL MEDIC[facility] [place]     Mode of Communication:  Video Conference via Sujey Sheridan MD, FACP

## 2022-02-28 ENCOUNTER — LAB (OUTPATIENT)
Dept: LAB | Facility: CLINIC | Age: 67
End: 2022-02-28
Attending: INTERNAL MEDICINE
Payer: MEDICARE

## 2022-02-28 DIAGNOSIS — Z79.899 ENCOUNTER FOR LONG-TERM CURRENT USE OF MEDICATION: ICD-10-CM

## 2022-02-28 DIAGNOSIS — Z48.298 AFTERCARE FOLLOWING ORGAN TRANSPLANT: ICD-10-CM

## 2022-02-28 DIAGNOSIS — Z94.0 KIDNEY REPLACED BY TRANSPLANT: ICD-10-CM

## 2022-02-28 DIAGNOSIS — Z94.0 KIDNEY TRANSPLANTED: Primary | ICD-10-CM

## 2022-02-28 LAB
ANION GAP SERPL CALCULATED.3IONS-SCNC: 9 MMOL/L (ref 3–14)
BUN SERPL-MCNC: 16 MG/DL (ref 7–30)
CALCIUM SERPL-MCNC: 8.8 MG/DL (ref 8.5–10.1)
CHLORIDE BLD-SCNC: 106 MMOL/L (ref 94–109)
CO2 SERPL-SCNC: 21 MMOL/L (ref 20–32)
CREAT SERPL-MCNC: 1.14 MG/DL (ref 0.52–1.04)
CYCLOSPORINE BLD LC/MS/MS-MCNC: 86 UG/L (ref 50–400)
ERYTHROCYTE [DISTWIDTH] IN BLOOD BY AUTOMATED COUNT: 14.3 % (ref 10–15)
GFR SERPL CREATININE-BSD FRML MDRD: 53 ML/MIN/1.73M2
GLUCOSE BLD-MCNC: 325 MG/DL (ref 70–99)
HCT VFR BLD AUTO: 42.2 % (ref 35–47)
HGB BLD-MCNC: 13.7 G/DL (ref 11.7–15.7)
MCH RBC QN AUTO: 30.2 PG (ref 26.5–33)
MCHC RBC AUTO-ENTMCNC: 32.5 G/DL (ref 31.5–36.5)
MCV RBC AUTO: 93 FL (ref 78–100)
PLATELET # BLD AUTO: 170 10E3/UL (ref 150–450)
POTASSIUM BLD-SCNC: 4.1 MMOL/L (ref 3.4–5.3)
RBC # BLD AUTO: 4.53 10E6/UL (ref 3.8–5.2)
SODIUM SERPL-SCNC: 136 MMOL/L (ref 133–144)
TME LAST DOSE: NORMAL H
TME LAST DOSE: NORMAL H
WBC # BLD AUTO: 5.4 10E3/UL (ref 4–11)

## 2022-02-28 PROCEDURE — 85027 COMPLETE CBC AUTOMATED: CPT

## 2022-02-28 PROCEDURE — 80048 BASIC METABOLIC PNL TOTAL CA: CPT

## 2022-02-28 PROCEDURE — 80158 DRUG ASSAY CYCLOSPORINE: CPT

## 2022-02-28 PROCEDURE — 36415 COLL VENOUS BLD VENIPUNCTURE: CPT

## 2022-03-01 ENCOUNTER — TELEPHONE (OUTPATIENT)
Dept: TRANSPLANT | Facility: CLINIC | Age: 67
End: 2022-03-01
Payer: MEDICARE

## 2022-03-01 DIAGNOSIS — Z48.298 AFTERCARE FOLLOWING ORGAN TRANSPLANT: Primary | ICD-10-CM

## 2022-03-01 RX ORDER — MYCOPHENOLIC ACID 180 MG/1
720 TABLET, DELAYED RELEASE ORAL 2 TIMES DAILY
Qty: 720 TABLET | Refills: 3 | Status: SHIPPED | OUTPATIENT
Start: 2022-03-01 | End: 2022-03-08

## 2022-03-01 NOTE — TELEPHONE ENCOUNTER
ISSUE: Creatinine 1.14, baseline 0.9-1    PLAN:  Call and assess hydration status.  How much water is he drinking per day?   Any recent illness, diarrhea, s/s of a UTI, or medication changes?   Any increased intake of alcohol or caffeine?  Recommend increasing hydration and repeating labs within 1 week.    OUTCOME:  Patient reports diarrhea 1-2 times per day,  Reports incontinence over night a couple nights ago. Intermittent abdominal pain, middle of the abdomen.      Patient reports ~ 48oz of fluid per day, encouraged to increase to 64 oz per day.    UOP adequate, reports frequency    Recent med change: propanolol increased from 20 mg TID to 40 mg in the AM, 20 mg in the afternoon, and 20 mg in the evening.      Francisco Jordan MD Sveiven, Marivel, RN  Repeat stool studies, no IVF yet, increase fluid intake. Check MPA level too please.       Left detailed message for patient with instruction to obtain labs as stated above.    Marivel Marcelo RN   Transplant Coordinator  [phone]

## 2022-03-02 ENCOUNTER — HOSPITAL ENCOUNTER (OUTPATIENT)
Dept: MRI IMAGING | Facility: CLINIC | Age: 67
Discharge: HOME | End: 2022-03-02
Attending: INTERNAL MEDICINE | Admitting: INTERNAL MEDICINE
Payer: MEDICARE

## 2022-03-02 DIAGNOSIS — R93.3 ABNORMAL FINDING ON GI TRACT IMAGING: ICD-10-CM

## 2022-03-02 PROCEDURE — 74183 MRI ABD W/O CNTR FLWD CNTR: CPT | Mod: MG

## 2022-03-02 PROCEDURE — 255N000002 HC RX 255 OP 636: Performed by: INTERNAL MEDICINE

## 2022-03-02 PROCEDURE — A9585 GADOBUTROL INJECTION: HCPCS | Performed by: INTERNAL MEDICINE

## 2022-03-02 RX ORDER — GADOBUTROL 604.72 MG/ML
9 INJECTION INTRAVENOUS ONCE
Status: COMPLETED | OUTPATIENT
Start: 2022-03-02 | End: 2022-03-02

## 2022-03-02 RX ADMIN — GADOBUTROL 9 ML: 604.72 INJECTION INTRAVENOUS at 13:37

## 2022-03-03 ENCOUNTER — VIRTUAL VISIT (OUTPATIENT)
Dept: PSYCHIATRY | Facility: CLINIC | Age: 67
End: 2022-03-03
Payer: MEDICARE

## 2022-03-03 ENCOUNTER — TELEPHONE (OUTPATIENT)
Dept: TRANSPLANT | Facility: CLINIC | Age: 67
End: 2022-03-03
Payer: MEDICARE

## 2022-03-03 DIAGNOSIS — F43.12 NIGHTMARES ASSOCIATED WITH CHRONIC POST-TRAUMATIC STRESS DISORDER: ICD-10-CM

## 2022-03-03 DIAGNOSIS — F51.5 NIGHTMARES ASSOCIATED WITH CHRONIC POST-TRAUMATIC STRESS DISORDER: ICD-10-CM

## 2022-03-03 DIAGNOSIS — F33.1 MAJOR DEPRESSIVE DISORDER, RECURRENT EPISODE, MODERATE (H): Primary | ICD-10-CM

## 2022-03-03 DIAGNOSIS — F42.4 EXCORIATION (SKIN-PICKING) DISORDER: ICD-10-CM

## 2022-03-03 DIAGNOSIS — Z48.298 AFTERCARE FOLLOWING ORGAN TRANSPLANT: Primary | ICD-10-CM

## 2022-03-03 RX ORDER — LORAZEPAM 0.5 MG/1
TABLET ORAL
Qty: 30 TABLET | Refills: 0 | Status: SHIPPED | OUTPATIENT
Start: 2022-03-03 | End: 2022-08-04

## 2022-03-03 RX ORDER — VILAZODONE HYDROCHLORIDE 40 MG/1
TABLET ORAL
Qty: 30 TABLET | Refills: 3 | Status: SHIPPED | OUTPATIENT
Start: 2022-03-03 | End: 2022-08-04

## 2022-03-03 RX ORDER — PRAZOSIN HYDROCHLORIDE 5 MG/1
CAPSULE ORAL
Qty: 90 CAPSULE | Refills: 3 | Status: SHIPPED | OUTPATIENT
Start: 2022-03-03 | End: 2022-07-14

## 2022-03-03 RX ORDER — ARIPIPRAZOLE 2 MG/1
3 TABLET ORAL 2 TIMES DAILY
Qty: 45 TABLET | Refills: 3 | Status: SHIPPED | OUTPATIENT
Start: 2022-03-03 | End: 2022-06-02

## 2022-03-03 RX ORDER — PRAZOSIN HYDROCHLORIDE 2 MG/1
2 CAPSULE ORAL AT BEDTIME
Qty: 30 CAPSULE | Refills: 3 | Status: SHIPPED | OUTPATIENT
Start: 2022-03-03 | End: 2022-06-14

## 2022-03-03 RX ORDER — VILAZODONE HYDROCHLORIDE 10 MG/1
TABLET ORAL
Qty: 30 TABLET | Refills: 3 | Status: SHIPPED | OUTPATIENT
Start: 2022-03-03 | End: 2022-08-04

## 2022-03-03 ASSESSMENT — PATIENT HEALTH QUESTIONNAIRE - PHQ9: SUM OF ALL RESPONSES TO PHQ QUESTIONS 1-9: 6
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
## 2022-03-03 NOTE — TELEPHONE ENCOUNTER
Patient calling back coordinator to confirm she received message about new lab orders.    Marivel Marcelo RN   Transplant Coordinator  [phone]

## 2022-03-04 ENCOUNTER — LAB (OUTPATIENT)
Dept: LAB | Facility: CLINIC | Age: 67
End: 2022-03-04
Payer: MEDICARE

## 2022-03-04 DIAGNOSIS — Z48.298 AFTERCARE FOLLOWING ORGAN TRANSPLANT: ICD-10-CM

## 2022-03-04 LAB — CMV DNA SPEC NAA+PROBE-ACNC: NOT DETECTED IU/ML

## 2022-03-04 PROCEDURE — 80180 DRUG SCRN QUAN MYCOPHENOLATE: CPT

## 2022-03-04 PROCEDURE — 36415 COLL VENOUS BLD VENIPUNCTURE: CPT

## 2022-03-05 ENCOUNTER — LAB (OUTPATIENT)
Dept: LAB | Facility: CLINIC | Age: 67
End: 2022-03-05
Payer: MEDICARE

## 2022-03-05 DIAGNOSIS — Z48.298 AFTERCARE FOLLOWING ORGAN TRANSPLANT: ICD-10-CM

## 2022-03-05 LAB — C DIFF TOX B STL QL: NEGATIVE

## 2022-03-05 PROCEDURE — 87493 C DIFF AMPLIFIED PROBE: CPT

## 2022-03-05 PROCEDURE — 87506 IADNA-DNA/RNA PROBE TQ 6-11: CPT

## 2022-03-05 PROCEDURE — 87209 SMEAR COMPLEX STAIN: CPT

## 2022-03-07 ENCOUNTER — TELEPHONE (OUTPATIENT)
Dept: TRANSPLANT | Facility: CLINIC | Age: 67
End: 2022-03-07
Payer: MEDICARE

## 2022-03-07 DIAGNOSIS — Z94.0 KIDNEY TRANSPLANTED: ICD-10-CM

## 2022-03-07 DIAGNOSIS — Z48.298 AFTERCARE FOLLOWING ORGAN TRANSPLANT: ICD-10-CM

## 2022-03-07 LAB
MYCOPHENOLATE SERPL LC/MS/MS-MCNC: 2.27 MG/L (ref 1–3.5)
MYCOPHENOLATE-G SERPL LC/MS/MS-MCNC: 131.9 MG/L (ref 30–95)
O+P STL MICRO: NEGATIVE
TME LAST DOSE: ABNORMAL H
TME LAST DOSE: ABNORMAL H

## 2022-03-08 RX ORDER — MYCOPHENOLIC ACID 180 MG/1
540 TABLET, DELAYED RELEASE ORAL 2 TIMES DAILY
Qty: 180 TABLET | Refills: 11 | Status: SHIPPED | OUTPATIENT
Start: 2022-03-08 | End: 2022-04-24

## 2022-03-08 NOTE — TELEPHONE ENCOUNTER
ISSUE: ongoing diarrhea, Mycophenolic acid level: 2.27    PLAN:    Francisco Jordan MD Sveiven, Sara, RN  Decrease MPA to 540mg bid and recheck MPA level with next labs   Francisco Jordan MD Sveiven, Sara, RN  Would repeat labs in 2 weeks please. She needs to take the fiber and probiotic and keep us posted if that helps     OUTCOME:  Patient v/u of decreasing MPA dose from 720 mg bid to 540 mg bid.  Patient also agreeable to starting fiber and a probiotic.  Instructed to get these OTC.  Will place repeat lab orders for two weeks out.    Marivel Marcelo RN   Transplant Coordinator  [phone]

## 2022-03-09 ENCOUNTER — VIRTUAL VISIT (OUTPATIENT)
Dept: GASTROENTEROLOGY | Facility: CLINIC | Age: 67
End: 2022-03-09
Attending: INTERNAL MEDICINE
Payer: MEDICARE

## 2022-03-09 DIAGNOSIS — K83.8 COMMON BILE DUCT DILATATION: ICD-10-CM

## 2022-03-09 DIAGNOSIS — K86.2 PANCREATIC CYST: Primary | ICD-10-CM

## 2022-03-09 PROCEDURE — 99204 OFFICE O/P NEW MOD 45 MIN: CPT | Mod: 95 | Performed by: INTERNAL MEDICINE

## 2022-03-09 PROCEDURE — G0463 HOSPITAL OUTPT CLINIC VISIT: HCPCS | Mod: PN,RTG | Performed by: INTERNAL MEDICINE

## 2022-03-09 NOTE — LETTER
3/9/2022         RE: Elizabeth Palma  [address]  [address]  [facility] [address]        Dear Colleague,    Thank you for referring your patient, Elizabeth Palma, to the [facility]. Please see a copy of my visit note below.      INTERVENTIONAL ENDOSCOPY OUTPATIENT CLINIC CONSULT  DATE OF SERVICE: 3/9/2022  PROVIDER REQUESTING CONSULT: Horacio Sheridan  Reason for Consultation: biliary dilation; pancreatic cysts     ASSESSMENT:  Elizabeth is a 66 year old female with a PMHx of polycystic kidney disease s/p kidney transplant in 2014, gastric bypass, and cholecystectomy referred for incidentally noted bile duct dilation and pancreatic cysts. LFTs have been normal. We obtained a MRI/MRCP which I reviewed the images of. Essentially biliary dilation is normal diameter for her post-cholecystectomy state measuring about 9 mm. No filling defects seen. No further follow up needed for this.    She does have multiple pancreatic cysts throughout her pancreas with the largest measuring ~12 mm in the pancreatic neck/body. Morphology seen on the MRI can be consistent with multiple side branch IPMNs. No worrisome features seen. I explained the epidemiology of pancreatic cysts and how common they are. They are significantly more common in polycystic kidney disease. I also explained the pre-malignant nature of IPMNs in general but that the overall risk is actually quite low. I do recommend surveillance imaging be done in 1 year to ensure stability and if stable then repeat every 3 years until patient is ~77 at which point we can stop if they remain stable. Patient is agreeable with this plan.    RECOMMENDATIONS:  - Repeat MRI/MRCP in 1 year for pancreatic cysts with clinic follow up after    Jose Joya MD  [facility]  Division of Gastroenterology and Hepatology  [facility] [address] [place], [place]  [address]    ________________________________________________________________  HPI:  Elizabeth is a 66 year old female with a PMHx of polycystic kidney disease s/p kidney transplant in , gastric bypass, and cholecystectomy referred for incidentally noted bile duct dilation. Patient had a RUQ ultrasound and CT in January for some substernal/epigastric pain that showed biliary dilation. LFTs have been normal. She was found to have a subsegmental pulmonary embolism. Denies jaundice. Pain resolved now. No family history of pancreatic cancer.      PMHx:  Past Medical History:   Diagnosis Date     Abnormal MRI, cervical spine 10/15/2011    2011; mild changes noted. Study done for left arm symptoms Impression:  1. Mild multilevel degenerative disc disease with no significant canal or neural stenosis seen. motion artifact on the STIR images in these are not interpretable. The remaining images were interpreted      Autosomal dominant polycystic kidney disease 2011     (Problem list name updated by automated process. Provider to review and confirm.)     CMC DJD(carpometacarpal degenerative joint disease), localized primary 2013     -donor kidney transplant 2014     Gastroesophageal reflux disease      Generalized anxiety disorder 11/15/2012     Glaucoma      Hyperlipidemia 10/15/2011     Hyperparathyroidism, secondary (H) 2015     Hypertension     resolved     Immunosuppressed status (H) 2014     Major depressive disorder, recurrent episode, moderate (H) 11/15/2012     Obesity (BMI 30-39.9)      OP (osteoporosis) T score -3.8 2009 T-score -3.7      LION (obstructive sleep apnoea) 10/15/2012    reported intolerant to CPAP -- she says she doesn't have LION     Pain in joint, forearm -- L unhealed Fx 2013     Premature menopause age 37 07/10/2012    OCP (vaginal bldg)-->HT which she stopped 2 mo later documented at 2007 visit (age 51).      Restless leg syndrome       Stiffness of joint, not elsewhere classified, hand 2013     Tremor 10/15/2011    head     Type 2 DM with Neuropathy     started with gestational diabetes     Uncomplicated asthma      Patient Active Problem List   Diagnosis     Hypertension     Hyperlipidemia     Abnormal MRI, cervical spine     Sensory loss     Premature menopause age 37     OP (osteoporosis) T score -3.8     Major depressive disorder, recurrent episode, moderate (H)     Generalized anxiety disorder     CMC DJD(carpometacarpal degenerative joint disease), localized primary     Pain in joint, forearm -- L unhealed Fx     -donor kidney transplant     Immunosuppressed status (H)     Hyperparathyroidism, secondary (H)     Hyperparathyroidism (H)     Senile osteoporosis     Pain in joint involving ankle and foot     Nightmares associated with chronic post-traumatic stress disorder     DM type 2 with Neuropathy, Hgb A1C 7.3 on 21     Posttraumatic stress disorder     Plantar fasciitis, bilateral     Right knee pain     Age-related osteoporosis without current pathological fracture     Narcissistic personality disorder (H)     Personal history of other drug therapy     Median sensory neuropathy, left     Marital conflict     Suicidal ideation     Autosomal dominant polycystic kidney disease     Secondary hyperparathyroidism (H)     Anemia, iron deficiency     Morbid obesity (H)     Chronic kidney disease, stage 3 (H)     PCKD, S/P Renal Transplant  ([facility])     Intractable back pain     UTI     Bacteremia     Chronic kidney disease, stage V (H)       PSurgHx:  Past Surgical History:   Procedure Laterality Date     ABDOMEN SURGERY       ANKLE SURGERY       C/SECTION, LOW TRANSVERSE      x 2     CHOLECYSTECTOMY       COLONOSCOPY       ESOPHAGOSCOPY, GASTROSCOPY, DUODENOSCOPY (EGD), COMBINED N/A 2015    Procedure: COMBINED ESOPHAGOSCOPY, GASTROSCOPY, DUODENOSCOPY (EGD);  Surgeon: Sky Davey MD;  [facility]      ESOPHAGOSCOPY, GASTROSCOPY, DUODENOSCOPY (EGD), COMBINED N/A 05/19/2015    Procedure: COMBINED ESOPHAGOSCOPY, GASTROSCOPY, DUODENOSCOPY (EGD), BIOPSY SINGLE OR MULTIPLE;  Surgeon: Sky Davey MD;  Location:  GI     EYE SURGERY       LAPAROSCOPY, SURGICAL; REPAIR INCISIONAL OR VENTRAL HERNIA       LASER SURGERY OF EYE Left 10/01/2020    sever vitreous strands     ORTHOPEDIC SURGERY       HERMINIA EN Y BOWEL  1990     WRIST SURGERY       [facility] TRANSPLANTATION OF KIDNEY  03/2014       MEDS:  Current Outpatient Medications   Medication     acetaminophen (TYLENOL) 500 MG tablet     acetylcysteine (N-ACETYL CYSTEINE) 600 MG CAPS capsule     albuterol (PROAIR HFA/PROVENTIL HFA/VENTOLIN HFA) 108 (90 Base) MCG/ACT inhaler     ARIPiprazole (ABILIFY) 2 MG tablet     aspirin EC 81 MG EC tablet     blood glucose monitoring (ACCU-CHEK RALPH PLUS) meter device kit     blood glucose monitoring (NO BRAND SPECIFIED) meter device kit     blood glucose monitoring (NO BRAND SPECIFIED) test strip     blood glucose monitoring (SOFTCLIX) lancets     Cholecalciferol (VITAMIN D) 1000 UNITS capsule     cyanocolbalamin (VITAMIN  B-12) 1000 MCG tablet     cycloSPORINE modified (GENERIC EQUIVALENT) 25 MG capsule     dextromethorphan (DELSYM) 30 MG/5ML liquid     estradiol (VAGIFEM) 10 MCG TABS vaginal tablet     ferrous sulfate (FEROSUL) 325 (65 Fe) MG tablet     fluticasone (FLONASE) 50 MCG/ACT nasal spray     fluticasone (FLOVENT HFA) 220 MCG/ACT inhaler     furosemide (LASIX) 40 MG tablet     gabapentin (NEURONTIN) 300 MG capsule     latanoprost (XALATAN) 0.005 % ophthalmic solution     levalbuterol (XOPENEX HFA) 45 MCG/ACT inhaler     LORazepam (ATIVAN) 0.5 MG tablet     metFORMIN (GLUCOPHAGE-XR) 500 MG 24 hr tablet     mycophenolic acid (GENERIC EQUIVALENT) 180 MG EC tablet     nystatin (MYCOSTATIN) 629133 UNIT/GM external cream     ondansetron (ZOFRAN-ODT) 4 MG ODT tab     order for DME     pantoprazole (PROTONIX) 40 MG EC tablet      Polyvinyl Alcohol-Povidone (REFRESH OP)     prazosin (MINIPRESS) 2 MG capsule     prazosin (MINIPRESS) 2 MG capsule     prazosin (MINIPRESS) 2 MG capsule     prazosin (MINIPRESS) 5 MG capsule     propranolol (INDERAL) 20 MG tablet     pseudoePHEDrine-guaiFENesin (MUCINEX D)  MG 12 hr tablet     pseudoePHEDrine-guaiFENesin (MUCINEX D)  MG 12 hr tablet     simvastatin (ZOCOR) 20 MG tablet     sulfamethoxazole-trimethoprim (BACTRIM) 400-80 MG tablet     topiramate (TOPAMAX) 200 MG tablet     Vaginal Lubricant (REPLENS) GEL     vilazodone (VIIBRYD) 10 MG TABS tablet     vilazodone (VIIBRYD) 40 MG TABS tablet     No current facility-administered medications for this visit.     ALLERGIES:    Allergies   Allergen Reactions     Percocet [Oxycodone-Acetaminophen] Nausea and Vomiting     Novocain [Procaine Hcl] Hives     Had reaction 25 years ago to old renetta. Pt reports multiple injections of lidocaine since then without reaction.  Tolerated lidocaine injection today without difficulty.  Osmar Mark MD IR Service.     FHx:  Family History   Problem Relation Age of Onset     Mental Illness Other         family hx     Heart Disease Other      Diabetes Other      Cancer Other      Genetic Disorder Father      Mental Illness Father      Diabetes Father      Hypertension Father      Hyperlipidemia Mother      Diabetes Mother      Hypertension Mother      Mental Illness Other      Diabetes Other      Glaucoma No family hx of      Macular Degeneration No family hx of        SOCIAL Hx:  Social History     Socioeconomic History     Marital status:      Spouse name: Rahat     Number of children: 2     Years of education: Not on file     Highest education level: Not on file   Occupational History     Comment: part-time   Tobacco Use     Smoking status: Never Smoker     Smokeless tobacco: Never Used   Substance and Sexual Activity     Alcohol use: No     Alcohol/week: 0.0 standard drinks     Drug use: No      Sexual activity: Yes     Partners: Male     Birth control/protection: None     Comment: 1 partner   Other Topics Concern     Parent/sibling w/ CABG, MI or angioplasty before 65F 55M? 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)      Social Determinants of Health     Financial Resource Strain: Not on file   Food Insecurity: Not on file   Transportation Needs: Not on file   Physical Activity: Not on file   Stress: Not on file   Social Connections: Not on file   Intimate Partner Violence: Not on file   Housing Stability: Not on file       ROS: A comprehensive Review of Systems was asked and answered in the negative unless specifically commented upon in the HPI    Physical Exam  LMP  (LMP Unknown)   There is no height or weight on file to calculate BMI.  Gen: A&Ox3, NAD  HEENT: Moist mucus membranes, no scleral icterus.  Lungs: no respiratory distress    LABS:  Lab on 03/05/2022   Component Date Value Ref Range Status     Campylobacter group 03/05/2022 Not Detected  Not Detected Final     Salmonella species 03/05/2022 Not Detected  Not Detected Final     Shigella species 03/05/2022 Not Detected  Not Detected Final     Vibrio group 03/05/2022 Not Detected  Not Detected Final     Rotavirus 03/05/2022 Not Detected  Not Detected Final     Shiga toxin 1 gene 03/05/2022 Not Detected  Not Detected Final     Shiga toxin 2 gene 03/05/2022 Not Detected  Not Detected Final     Norovirus I and II 03/05/2022 Not Detected  Not Detected Final     Yersinia enterocolitica 03/05/2022 Not Detected  Not Detected Final     C Difficile Toxin B by PCR 03/05/2022 Negative  Negative Final    A negative result does not exclude actual disease due to C. difficile and may be due to improper collection, handling and storage of the specimen or the number of organisms in the specimen is below the detection limit of the assay.     OVA AND PARASITE EXAM 03/05/2022 Negative  Negative Final     A single negative specimen does not rule out parasitic infection.       IMAGIN2022   Formatting of this note might be different from the original.   IMPRESSION   COMPARISON: None.     TECHNIQUE: Images were obtained through the chest following the administration of 75 mL IOPAMIDOL 76 % IV SOLN contrast using a pulmonary embolus protocol.     FINDINGS:     CTA: Dynamic contrast bolus is adequate for evaluation of pulmonary arteries. There is a small pulmonary embolism in a branch point of the posterior right upper lobe subsegmental pulmonary arteries (series 5 image 107). No additional pulmonary embolism is seen. No evidence of right heart strain or increased right heart pressures.     CHEST WALL AND LOWER NECK: Unremarkable.     LYMPHADENOPATHY: No mediastinal, hilar or axillary adenopathy.     CARDIOMEDIASTINUM: Unremarkable.     PLEURA/ PLEURAL EFFUSION: Trace bilateral pleural effusion versus chronic pleural thickening. No pneumothorax.     BONES: Chronic left rib deformities. No acute or aggressive bone lesion.     LUNG AND LARGE AIRWAYS: Patchy ground glass opacities scattered throughout the lungs, favor atelectasis although atypical infection such as COVID19 pneumonia could have a similar appearance. No consolidation. The central tracheal brachial tree is patent.     ABDOMEN/PELVIS:   LIVER: Innumerable hepatic cysts. No suspicious hepatic lesion..     GALLBLADDER AND BILIARY TREE: Cholecystectomy. Mild dilation of the common bile duct and central intrahepatic ducts without visualized ductal stone. Findings are nonspecific but can be seen after cholecystectomy.     PANCREAS: Mild fatty atrophy of the pancreas. No pancreatic ductal dilatation.     SPLEEN: Unremarkable.     ADRENALS: Left adrenal adenoma. The right adrenal gland is unremarkable.     KIDNEYS AND URETERS: Innumerable renal cysts of the native kidneys, including a probable hemorrhagic/proteinaceous cyst in the left kidney. Right lower  quadrant renal transplant. No renal stone. No hydronephrosis or hydroureter.     VESSELS: No abdominal aortic aneurysm.     BOWEL: Postoperative changes of gastric bypass. No inflammatory changes or obstruction. Normal appendix.     BLADDER: Unremarkable.     REPRODUCTIVE ORGANS: No pelvic masses.     MESENTERY/PERITONEUM: No enlarged mesenteric lymph nodes. No ascites or free air. No focal fluid collection.     RETROPERITONEUM: No adenopathy.     ABDOMINAL WALL/SOFT TISSUES: Small fat-containing ventral abdominal wall hernia. Calcified nodules in the anterior abdominal wall and soft tissues of the buttocks.     BONES: Degenerative changes of the spine and hips. Sclerotic lesions in the femoral heads, favored to represent bone infarcts. No acute or aggressive bone lesion.     IMPRESSION:   1. Subsegmental pulmonary embolism in the right upper lobe. No evidence of right heart strain or increased right heart pressures.   2. Mild dilation of the common bile duct and central intrahepatic ducts. No visualized ductal stone or obstruction. Findings may be due to some component of reservoir effect from prior cholecystectomy as well as mass effect from innumerable hepatic cysts.   3. Polycystic hepatorenal disease. Unremarkable appearance of the right lower quadrant renal transplant.     MAGNETIC RESONANCE CHOLANGIOPANCREATOGRAPHY  3/2/2022 3:12 PM      HISTORY: Abnormal finding on GI tract imaging.     COMPARISON: February 7, 2022     TECHNIQUE: Multiplanar, multisequence images of the abdomen acquired  before and after administration of 9 mL Gadavist  intravenous  contrast. MRCP images and coronal 3-D thin section T2-weighted MRCP  images through the biliary tree. 3D image reformatting was performed  on the acquisition scanner.     FINDINGS: Cholecystectomy. Extrahepatic bile duct measures 9 mm,  within normal limits for postcholecystectomy state. Mild intrahepatic  biliary dilatation likely related to reservoir effect.  Polycystic  liver. No enhancing liver lesions. Polycystic pancreas. No definite  pancreatic ductal dilatation. Polycystic kidneys. Some appear  proteinaceous or hemorrhagic. No definite enhancement present. No  hydronephrosis. Adrenal glands demonstrate a nodule on the left  measuring 1.7 cm with signal drop-off on out-of-phase imaging.                                                                      IMPRESSION:  1. No definite biliary dilatation for postcholecystectomy state.  2. Polycystic liver, kidneys, and pancreas.        Jose Joya MD

## 2022-03-09 NOTE — PROGRESS NOTES
Elizabeth is a 66 year old who is being evaluated via a billable video visit.      How would you like to obtain your AVS? MyChart  If the video visit is dropped, the invitation should be resent by: Text to cell phone: [phone]  Will anyone else be joining your video visit? Sienna Berman VF    Video Start Time: 8:22 AM  Video-Visit Details    Type of service:  Video Visit    Video End Time:8:32 AM    Originating Location (pt. Location): Home    Distant Location (provider location):  [facility]     Platform used for Video Visit: [facility]     INTERVENTIONAL ENDOSCOPY OUTPATIENT CLINIC CONSULT  DATE OF SERVICE: 3/9/2022  PROVIDER REQUESTING CONSULT: Horacio Sheridan  Reason for Consultation: biliary dilation; pancreatic cysts     ASSESSMENT:  Elizabeth is a 66 year old female with a PMHx of polycystic kidney disease s/p kidney transplant in 2014, gastric bypass, and cholecystectomy referred for incidentally noted bile duct dilation and pancreatic cysts. LFTs have been normal. We obtained a MRI/MRCP which I reviewed the images of. Essentially biliary dilation is normal diameter for her post-cholecystectomy state measuring about 9 mm. No filling defects seen. No further follow up needed for this.    She does have multiple pancreatic cysts throughout her pancreas with the largest measuring ~12 mm in the pancreatic neck/body. Morphology seen on the MRI can be consistent with multiple side branch IPMNs. No worrisome features seen. I explained the epidemiology of pancreatic cysts and how common they are. They are significantly more common in polycystic kidney disease. I also explained the pre-malignant nature of IPMNs in general but that the overall risk is actually quite low. I do recommend surveillance imaging be done in 1 year to ensure stability and if stable then repeat every 3 years until patient is ~77 at which point we can stop if they remain stable. Patient is agreeable with this  plan.    RECOMMENDATIONS:  - Repeat MRI/MRCP in 1 year for pancreatic cysts with clinic follow up after    Jose Joya MD  [facility]  Division of Gastroenterology and Hepatology  [facility] [address] [place], [place] [address]    ________________________________________________________________  HPI:  Elizabeth is a 66 year old female with a PMHx of polycystic kidney disease s/p kidney transplant in , gastric bypass, and cholecystectomy referred for incidentally noted bile duct dilation. Patient had a RUQ ultrasound and CT in January for some substernal/epigastric pain that showed biliary dilation. LFTs have been normal. She was found to have a subsegmental pulmonary embolism. Denies jaundice. Pain resolved now. No family history of pancreatic cancer.      PMHx:  Past Medical History:   Diagnosis Date     Abnormal MRI, cervical spine 10/15/2011    2011; mild changes noted. Study done for left arm symptoms Impression:  1. Mild multilevel degenerative disc disease with no significant canal or neural stenosis seen. motion artifact on the STIR images in these are not interpretable. The remaining images were interpreted      Autosomal dominant polycystic kidney disease 2011     (Problem list name updated by automated process. Provider to review and confirm.)     [facility] DJD(carpometacarpal degenerative joint disease), localized primary 2013     -donor kidney transplant 2014     Gastroesophageal reflux disease      Generalized anxiety disorder 11/15/2012     Glaucoma      Hyperlipidemia 10/15/2011     Hyperparathyroidism, secondary (H) 2015     Hypertension     resolved     Immunosuppressed status (H) 2014     Major depressive disorder, recurrent episode, moderate (H) 11/15/2012     Obesity (BMI 30-39.9)      OP (osteoporosis) T score -3.8 2009 T-score -3.7      LION (obstructive sleep apnoea) 10/15/2012    reported intolerant to  CPAP -- she says she doesn't have LION     Pain in joint, forearm -- L unhealed Fx 2013     Premature menopause age 37 07/10/2012    OCP (vaginal bldg)-->HT which she stopped 2 mo later documented at 2007 visit (age 51).      Restless leg syndrome      Stiffness of joint, not elsewhere classified, hand 2013     Tremor 10/15/2011    head     Type 2 DM with Neuropathy     started with gestational diabetes     Uncomplicated asthma      Patient Active Problem List   Diagnosis     Hypertension     Hyperlipidemia     Abnormal MRI, cervical spine     Sensory loss     Premature menopause age 37     OP (osteoporosis) T score -3.8     Major depressive disorder, recurrent episode, moderate (H)     Generalized anxiety disorder     [facility] DJD(carpometacarpal degenerative joint disease), localized primary     Pain in joint, forearm -- L unhealed Fx     -donor kidney transplant     Immunosuppressed status (H)     Hyperparathyroidism, secondary (H)     Hyperparathyroidism (H)     Senile osteoporosis     Pain in joint involving ankle and foot     Nightmares associated with chronic post-traumatic stress disorder     DM type 2 with Neuropathy, Hgb A1C 7.3 on 21     Posttraumatic stress disorder     Plantar fasciitis, bilateral     Right knee pain     Age-related osteoporosis without current pathological fracture     Narcissistic personality disorder (H)     Personal history of other drug therapy     Median sensory neuropathy, left     Marital conflict     Suicidal ideation     Autosomal dominant polycystic kidney disease     Secondary hyperparathyroidism (H)     Anemia, iron deficiency     Morbid obesity (H)     Chronic kidney disease, stage 3 (H)     PCKD, S/P Renal Transplant  ([facility])     Intractable back pain     UTI     Bacteremia     Chronic kidney disease, stage V (H)       PSurgHx:  Past Surgical History:   Procedure Laterality Date     ABDOMEN SURGERY       ANKLE SURGERY       C/SECTION,  LOW TRANSVERSE      x 2     CHOLECYSTECTOMY  1990     COLONOSCOPY       ESOPHAGOSCOPY, GASTROSCOPY, DUODENOSCOPY (EGD), COMBINED N/A 05/19/2015    Procedure: COMBINED ESOPHAGOSCOPY, GASTROSCOPY, DUODENOSCOPY (EGD);  Surgeon: Sky Davey MD;  Location:  GI     ESOPHAGOSCOPY, GASTROSCOPY, DUODENOSCOPY (EGD), COMBINED N/A 05/19/2015    Procedure: COMBINED ESOPHAGOSCOPY, GASTROSCOPY, DUODENOSCOPY (EGD), BIOPSY SINGLE OR MULTIPLE;  Surgeon: Sky Davey MD;  Location:  GI     EYE SURGERY       LAPAROSCOPY, SURGICAL; REPAIR INCISIONAL OR VENTRAL HERNIA       LASER SURGERY OF EYE Left 10/01/2020    sever vitreous strands     ORTHOPEDIC SURGERY       HERMINIA EN Y BOWEL  1990     WRIST SURGERY       [facility] TRANSPLANTATION OF KIDNEY  03/2014       MEDS:  Current Outpatient Medications   Medication     acetaminophen (TYLENOL) 500 MG tablet     acetylcysteine (N-ACETYL CYSTEINE) 600 MG CAPS capsule     albuterol (PROAIR HFA/PROVENTIL HFA/VENTOLIN HFA) 108 (90 Base) MCG/ACT inhaler     ARIPiprazole (ABILIFY) 2 MG tablet     aspirin EC 81 MG EC tablet     blood glucose monitoring (ACCU-CHEK RALPH PLUS) meter device kit     blood glucose monitoring (NO BRAND SPECIFIED) meter device kit     blood glucose monitoring (NO BRAND SPECIFIED) test strip     blood glucose monitoring (SOFTCLIX) lancets     Cholecalciferol (VITAMIN D) 1000 UNITS capsule     cyanocolbalamin (VITAMIN  B-12) 1000 MCG tablet     cycloSPORINE modified (GENERIC EQUIVALENT) 25 MG capsule     dextromethorphan (DELSYM) 30 MG/5ML liquid     estradiol (VAGIFEM) 10 MCG TABS vaginal tablet     ferrous sulfate (FEROSUL) 325 (65 Fe) MG tablet     fluticasone (FLONASE) 50 MCG/ACT nasal spray     fluticasone (FLOVENT HFA) 220 MCG/ACT inhaler     furosemide (LASIX) 40 MG tablet     gabapentin (NEURONTIN) 300 MG capsule     latanoprost (XALATAN) 0.005 % ophthalmic solution     levalbuterol (XOPENEX HFA) 45 MCG/ACT inhaler     LORazepam (ATIVAN) 0.5 MG tablet      metFORMIN (GLUCOPHAGE-XR) 500 MG 24 hr tablet     mycophenolic acid (GENERIC EQUIVALENT) 180 MG EC tablet     nystatin (MYCOSTATIN) 937979 UNIT/GM external cream     ondansetron (ZOFRAN-ODT) 4 MG ODT tab     order for DME     pantoprazole (PROTONIX) 40 MG EC tablet     Polyvinyl Alcohol-Povidone (REFRESH OP)     prazosin (MINIPRESS) 2 MG capsule     prazosin (MINIPRESS) 2 MG capsule     prazosin (MINIPRESS) 2 MG capsule     prazosin (MINIPRESS) 5 MG capsule     propranolol (INDERAL) 20 MG tablet     pseudoePHEDrine-guaiFENesin (MUCINEX D)  MG 12 hr tablet     pseudoePHEDrine-guaiFENesin (MUCINEX D)  MG 12 hr tablet     simvastatin (ZOCOR) 20 MG tablet     sulfamethoxazole-trimethoprim (BACTRIM) 400-80 MG tablet     topiramate (TOPAMAX) 200 MG tablet     Vaginal Lubricant (REPLENS) GEL     vilazodone (VIIBRYD) 10 MG TABS tablet     vilazodone (VIIBRYD) 40 MG TABS tablet     No current facility-administered medications for this visit.     ALLERGIES:    Allergies   Allergen Reactions     Percocet [Oxycodone-Acetaminophen] Nausea and Vomiting     Novocain [Procaine Hcl] Hives     Had reaction 25 years ago to old renetta. Pt reports multiple injections of lidocaine since then without reaction.  Tolerated lidocaine injection today without difficulty.  Osmar Mark MD IR Service.     FHx:  Family History   Problem Relation Age of Onset     Mental Illness Other         family hx     Heart Disease Other      Diabetes Other      Cancer Other      Genetic Disorder Father      Mental Illness Father      Diabetes Father      Hypertension Father      Hyperlipidemia Mother      Diabetes Mother      Hypertension Mother      Mental Illness Other      Diabetes Other      Glaucoma No family hx of      Macular Degeneration No family hx of        SOCIAL Hx:  Social History     Socioeconomic History     Marital status:      Spouse name: Rahat     Number of children: 2     Years of education: Not on file      Highest education level: Not on file   Occupational History     Comment: part-time   Tobacco Use     Smoking status: Never Smoker     Smokeless tobacco: Never Used   Substance and Sexual Activity     Alcohol use: No     Alcohol/week: 0.0 standard drinks     Drug use: No     Sexual activity: Yes     Partners: Male     Birth control/protection: None     Comment: 1 partner   Other Topics Concern     Parent/sibling w/ CABG, MI or angioplasty before 65F 55M? 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)      Social Determinants of Health     Financial Resource Strain: Not on file   Food Insecurity: Not on file   Transportation Needs: Not on file   Physical Activity: Not on file   Stress: Not on file   Social Connections: Not on file   Intimate Partner Violence: Not on file   Housing Stability: Not on file       ROS: A comprehensive Review of Systems was asked and answered in the negative unless specifically commented upon in the HPI    Physical Exam  LMP  (LMP Unknown)   There is no height or weight on file to calculate BMI.  Gen: A&Ox3, NAD  HEENT: Moist mucus membranes, no scleral icterus.  Lungs: no respiratory distress    LABS:  Lab on 03/05/2022   Component Date Value Ref Range Status     Campylobacter group 03/05/2022 Not Detected  Not Detected Final     Salmonella species 03/05/2022 Not Detected  Not Detected Final     Shigella species 03/05/2022 Not Detected  Not Detected Final     Vibrio group 03/05/2022 Not Detected  Not Detected Final     Rotavirus 03/05/2022 Not Detected  Not Detected Final     Shiga toxin 1 gene 03/05/2022 Not Detected  Not Detected Final     Shiga toxin 2 gene 03/05/2022 Not Detected  Not Detected Final     Norovirus I and II 03/05/2022 Not Detected  Not Detected Final     Yersinia enterocolitica 03/05/2022 Not Detected  Not Detected Final     C Difficile Toxin B by PCR 03/05/2022 Negative  Negative Final    A negative result does  not exclude actual disease due to C. difficile and may be due to improper collection, handling and storage of the specimen or the number of organisms in the specimen is below the detection limit of the assay.     OVA AND PARASITE EXAM 2022 Negative  Negative Final    A single negative specimen does not rule out parasitic infection.       IMAGIN2022   Formatting of this note might be different from the original.   IMPRESSION   COMPARISON: None.     TECHNIQUE: Images were obtained through the chest following the administration of 75 mL IOPAMIDOL 76 % IV SOLN contrast using a pulmonary embolus protocol.     FINDINGS:     CTA: Dynamic contrast bolus is adequate for evaluation of pulmonary arteries. There is a small pulmonary embolism in a branch point of the posterior right upper lobe subsegmental pulmonary arteries (series 5 image 107). No additional pulmonary embolism is seen. No evidence of right heart strain or increased right heart pressures.     CHEST WALL AND LOWER NECK: Unremarkable.     LYMPHADENOPATHY: No mediastinal, hilar or axillary adenopathy.     CARDIOMEDIASTINUM: Unremarkable.     PLEURA/ PLEURAL EFFUSION: Trace bilateral pleural effusion versus chronic pleural thickening. No pneumothorax.     BONES: Chronic left rib deformities. No acute or aggressive bone lesion.     LUNG AND LARGE AIRWAYS: Patchy ground glass opacities scattered throughout the lungs, favor atelectasis although atypical infection such as COVID19 pneumonia could have a similar appearance. No consolidation. The central tracheal brachial tree is patent.     ABDOMEN/PELVIS:   LIVER: Innumerable hepatic cysts. No suspicious hepatic lesion..     GALLBLADDER AND BILIARY TREE: Cholecystectomy. Mild dilation of the common bile duct and central intrahepatic ducts without visualized ductal stone. Findings are nonspecific but can be seen after cholecystectomy.     PANCREAS: Mild fatty atrophy of the pancreas. No pancreatic ductal  dilatation.     SPLEEN: Unremarkable.     ADRENALS: Left adrenal adenoma. The right adrenal gland is unremarkable.     KIDNEYS AND URETERS: Innumerable renal cysts of the native kidneys, including a probable hemorrhagic/proteinaceous cyst in the left kidney. Right lower quadrant renal transplant. No renal stone. No hydronephrosis or hydroureter.     VESSELS: No abdominal aortic aneurysm.     BOWEL: Postoperative changes of gastric bypass. No inflammatory changes or obstruction. Normal appendix.     BLADDER: Unremarkable.     REPRODUCTIVE ORGANS: No pelvic masses.     MESENTERY/PERITONEUM: No enlarged mesenteric lymph nodes. No ascites or free air. No focal fluid collection.     RETROPERITONEUM: No adenopathy.     ABDOMINAL WALL/SOFT TISSUES: Small fat-containing ventral abdominal wall hernia. Calcified nodules in the anterior abdominal wall and soft tissues of the buttocks.     BONES: Degenerative changes of the spine and hips. Sclerotic lesions in the femoral heads, favored to represent bone infarcts. No acute or aggressive bone lesion.     IMPRESSION:   1. Subsegmental pulmonary embolism in the right upper lobe. No evidence of right heart strain or increased right heart pressures.   2. Mild dilation of the common bile duct and central intrahepatic ducts. No visualized ductal stone or obstruction. Findings may be due to some component of reservoir effect from prior cholecystectomy as well as mass effect from innumerable hepatic cysts.   3. Polycystic hepatorenal disease. Unremarkable appearance of the right lower quadrant renal transplant.     MAGNETIC RESONANCE CHOLANGIOPANCREATOGRAPHY  3/2/2022 3:12 PM      HISTORY: Abnormal finding on GI tract imaging.     COMPARISON: February 7, 2022     TECHNIQUE: Multiplanar, multisequence images of the abdomen acquired  before and after administration of 9 mL Gadavist  intravenous  contrast. MRCP images and coronal 3-D thin section T2-weighted MRCP  images through the  biliary tree. 3D image reformatting was performed  on the acquisition scanner.     FINDINGS: Cholecystectomy. Extrahepatic bile duct measures 9 mm,  within normal limits for postcholecystectomy state. Mild intrahepatic  biliary dilatation likely related to reservoir effect. Polycystic  liver. No enhancing liver lesions. Polycystic pancreas. No definite  pancreatic ductal dilatation. Polycystic kidneys. Some appear  proteinaceous or hemorrhagic. No definite enhancement present. No  hydronephrosis. Adrenal glands demonstrate a nodule on the left  measuring 1.7 cm with signal drop-off on out-of-phase imaging.                                                                      IMPRESSION:  1. No definite biliary dilatation for postcholecystectomy state.  2. Polycystic liver, kidneys, and pancreas.

## 2022-03-14 DIAGNOSIS — K86.2 PANCREATIC CYST: Primary | ICD-10-CM

## 2022-03-14 DIAGNOSIS — N18.5 CHRONIC KIDNEY DISEASE, STAGE V (H): ICD-10-CM

## 2022-03-18 ENCOUNTER — VIRTUAL VISIT (OUTPATIENT)
Dept: INTERNAL MEDICINE | Facility: CLINIC | Age: 67
End: 2022-03-18
Payer: MEDICARE

## 2022-03-18 DIAGNOSIS — G25.0 BENIGN ESSENTIAL TREMOR: ICD-10-CM

## 2022-03-18 DIAGNOSIS — G47.10 EXCESSIVE SLEEPINESS: Primary | ICD-10-CM

## 2022-03-18 DIAGNOSIS — Z94.0 DECEASED-DONOR KIDNEY TRANSPLANT: ICD-10-CM

## 2022-03-18 DIAGNOSIS — R53.83 FATIGUE, UNSPECIFIED TYPE: ICD-10-CM

## 2022-03-18 PROCEDURE — 99214 OFFICE O/P EST MOD 30 MIN: CPT | Mod: 95 | Performed by: INTERNAL MEDICINE

## 2022-03-18 RX ORDER — SIMVASTATIN 20 MG
20 TABLET ORAL AT BEDTIME
Qty: 90 TABLET | Refills: 0 | Status: SHIPPED | OUTPATIENT
Start: 2022-03-18 | End: 2022-04-27

## 2022-03-18 NOTE — NURSING NOTE
Elizabeth Palma is a 66 year old female patient who is being evaluated via a billable video visit.     Chief Complaint   Patient presents with     RECHECK     three month follow-up     FABY Downey at 1:05 PM on 3/18/2022

## 2022-03-18 NOTE — TELEPHONE ENCOUNTER
Signed lipid order per standing order  Claudia Colindres, EMT at 9:41 AM on 3/18/2022.  [facility]  Clinics and Surgery [facility]  [phone]

## 2022-03-18 NOTE — Clinical Note
Please reach out to her to set up an in person visit with me when feasible.  This is to evaluate joint pain.  Thank you, Horacio

## 2022-03-18 NOTE — PROGRESS NOTES
"SUBJECTIVE/OBJECTIVE:    Elizabeth Palma is a 66 year old female who presents to clinic today for the following health issues:    Tremor and Jerks - she still has a bad tremor. I had increased the dose of the propranolol but she does not think this helped.     Anxiety - she is better with medication change of Viibryd from 40mg to 50mg.    She is tired a lot and has been going on a long time. She says she is sleeping more and falling asleep more on the couch.  This has been a long time.  When she wakes up she is still tired.  No movements at night.  No snoring or apnea.  Per sleep medicine in 5/27/21: \"Plan:  adjust sleep schedule  To 8 hours between 10 30-6 3 AM.  She will generally continue to try to avoid naps but may take 1 single nap within 8 hours of getting up.  She will continue to avoid inadvertent naps are close to bedtime.  She is reporting reduced daytime tiredness and sleepiness.  She will continue to avoid driving if drowsy\".     She is having all over joint pain. She says it is ankles (right > left), mid to lower back, both wrist but left > right - all the joints she had surgery on. They hurt all of the time but mostly when walking. She says they will look swollen but no color change - no redness.     She leaves for [place] but no date has been set.  This year sometime.    The following have been reviewed:  Medication list  Allergies  Past medical/surgical history    ASSESSMENT/PLAN:   Myoclonic jerks and tremor - I increased propranolol to 40mg Qam and 20mg Qafternoon and 20mg Qevening on 2/25/22, but there was no change.  I am not sure that I am on the right track with treating these abnormal movements.  Therefore I will have her seen neurology to help with the diagnosis and and ideally treatment.    Sleepiness - she is on propranolol and a higher dose of the Viibryd but she states that the tiredness that she has had long precedes this.  In looking back in her records I see that she is worked quite a " "bit with the sleep medicine people.  The last visit was about 10 months ago.  I am not sure what more I would have to offer her to help with her sleepiness.  She states that she has been unsuccessful in changing her sleep pattern as recommended above by the sleep medicine specialist.  I am hopeful that a return visit with them would reinforce the sleep changes she should make and may be allow her to get higher quality sleep.    Joint pain -she named multiple joints as causing her problem.  I am of course hampered by not being able to do a joint exam during the video visit.  I stated that the next step in evaluating joint pain is typically an exam to get some focus for further investigation.  I asked that she come and see me to investigate further.    Pancreatitic cysts - seen by GI on 3/9/22 who recommended \"surveillance imaging be done in 1 year to ensure stability and if stable then repeat every 3 years until patient is ~77 at which point we can stop if they remain stable.\"    Number and complexity of problems:  2 unstable chronic illness or with exacerbation (joint pains, sleepiness)  1 undiagnosed new problem (tremor)    Amount and Complexity of Data Reviewed/Analyzed:  2 External notes reviewed    Risk of complication/morbidity of patient management:  Patient receiving prescription management    Video visit start time: 1:01p  Video/Telephone visit end time: 1:23p    Originating Location (pt. Location): Home    Distant Location (provider location):  [facility]     Mode of Communication:  Video Conference via Claudia Sheridan MD, FACP    "

## 2022-03-21 ENCOUNTER — LAB (OUTPATIENT)
Dept: LAB | Facility: CLINIC | Age: 67
End: 2022-03-21
Payer: MEDICARE

## 2022-03-21 ENCOUNTER — TELEPHONE (OUTPATIENT)
Dept: TRANSPLANT | Facility: CLINIC | Age: 67
End: 2022-03-21

## 2022-03-21 DIAGNOSIS — N18.5 CHRONIC KIDNEY DISEASE, STAGE V (H): ICD-10-CM

## 2022-03-21 DIAGNOSIS — Z94.0 KIDNEY TRANSPLANTED: ICD-10-CM

## 2022-03-21 DIAGNOSIS — Z48.298 AFTERCARE FOLLOWING ORGAN TRANSPLANT: ICD-10-CM

## 2022-03-21 DIAGNOSIS — Z48.298 AFTERCARE FOLLOWING ORGAN TRANSPLANT: Primary | ICD-10-CM

## 2022-03-21 LAB
ANION GAP SERPL CALCULATED.3IONS-SCNC: 11 MMOL/L (ref 3–14)
BUN SERPL-MCNC: 19 MG/DL (ref 7–30)
CALCIUM SERPL-MCNC: 10.3 MG/DL (ref 8.5–10.1)
CHLORIDE BLD-SCNC: 109 MMOL/L (ref 94–109)
CHOLEST SERPL-MCNC: 196 MG/DL
CO2 SERPL-SCNC: 21 MMOL/L (ref 20–32)
CREAT SERPL-MCNC: 1.24 MG/DL (ref 0.52–1.04)
ERYTHROCYTE [DISTWIDTH] IN BLOOD BY AUTOMATED COUNT: 14.4 % (ref 10–15)
FASTING STATUS PATIENT QL REPORTED: NO
GFR SERPL CREATININE-BSD FRML MDRD: 48 ML/MIN/1.73M2
GLUCOSE BLD-MCNC: 205 MG/DL (ref 70–99)
HCT VFR BLD AUTO: 42.9 % (ref 35–47)
HDLC SERPL-MCNC: 54 MG/DL
HGB BLD-MCNC: 14 G/DL (ref 11.7–15.7)
LDLC SERPL CALC-MCNC: 86 MG/DL
MCH RBC QN AUTO: 29.9 PG (ref 26.5–33)
MCHC RBC AUTO-ENTMCNC: 32.6 G/DL (ref 31.5–36.5)
MCV RBC AUTO: 92 FL (ref 78–100)
MYCOPHENOLATE SERPL LC/MS/MS-MCNC: 1.19 MG/L (ref 1–3.5)
MYCOPHENOLATE-G SERPL LC/MS/MS-MCNC: 112.9 MG/L (ref 30–95)
NONHDLC SERPL-MCNC: 142 MG/DL
PLATELET # BLD AUTO: 176 10E3/UL (ref 150–450)
POTASSIUM BLD-SCNC: 4.5 MMOL/L (ref 3.4–5.3)
RBC # BLD AUTO: 4.69 10E6/UL (ref 3.8–5.2)
SODIUM SERPL-SCNC: 141 MMOL/L (ref 133–144)
TME LAST DOSE: ABNORMAL H
TME LAST DOSE: ABNORMAL H
TRIGL SERPL-MCNC: 279 MG/DL
WBC # BLD AUTO: 4.9 10E3/UL (ref 4–11)

## 2022-03-21 PROCEDURE — 85027 COMPLETE CBC AUTOMATED: CPT

## 2022-03-21 PROCEDURE — 80048 BASIC METABOLIC PNL TOTAL CA: CPT

## 2022-03-21 PROCEDURE — 80180 DRUG SCRN QUAN MYCOPHENOLATE: CPT

## 2022-03-21 PROCEDURE — 36415 COLL VENOUS BLD VENIPUNCTURE: CPT

## 2022-03-21 PROCEDURE — 80061 LIPID PANEL: CPT

## 2022-03-23 ENCOUNTER — TELEPHONE (OUTPATIENT)
Dept: PSYCHIATRY | Facility: CLINIC | Age: 67
End: 2022-03-23
Payer: MEDICARE

## 2022-03-23 NOTE — TELEPHONE ENCOUNTER
What is the concern that needs to be addressed by a nurse? Patient spouse called a medication assistance program the are trying to get set up for. Said Dr. Hatch had previously submitted something for her but they are now wanting additional information sent. Unable to determine what information they are wanting. Please call back to discuss    May a detailed message be left on voicemail?     Date of last office visit: 3/3/22    Message routed to: [facility] Psychiat[facility] RN Pool

## 2022-03-23 NOTE — TELEPHONE ENCOUNTER
Writer returned call Elizabeth and her spouse Rahat     Reviewed information that is needed by patient assistance program, they were able to take down all the information.  They might need to have Dr. Hatch sign again so they will have patient assistance fax us the form

## 2022-03-24 ENCOUNTER — TELEPHONE (OUTPATIENT)
Dept: TRANSPLANT | Facility: CLINIC | Age: 67
End: 2022-03-24
Payer: MEDICARE

## 2022-03-24 DIAGNOSIS — Z48.298 AFTERCARE FOLLOWING ORGAN TRANSPLANT: Primary | ICD-10-CM

## 2022-03-24 NOTE — TELEPHONE ENCOUNTER
Call placed to ARACELI son/GABRIELLE of labs and colonoscopy.  Orders placed.      Marivel Marcelo RN   Transplant Coordinator  [phone]

## 2022-03-24 NOTE — TELEPHONE ENCOUNTER
----- Message from Francisco Jordan MD sent at 3/22/2022  2:59 PM CDT -----  Need fecal elastase and she needs colonoscopy with biopsies to rule out MPA toxicity.   ----- Message -----  From: Marivel Marcelo RN  Sent: 3/22/2022   2:52 PM CDT  To: Francisco Jordan MD    FYI I called Elizabeth to let her know I placed order for [facility] and she let me know her diarrhea has picked up again.  Not sure if you want to do anything else right now.  BMP and [facility] orders in for next week.    Marivel Marcelo RN   Transplant Coordinator  [phone]  ----- Message -----  From: Francisco Jordan MD  Sent: 3/21/2022   4:10 PM CDT  To: Marivel Marcelo RN    Obtain U/A w/ reflex to culture with next labs that should be in a week please.   ----- Message -----  From: Marivel Marcelo RN  Sent: 3/21/2022   4:06 PM CDT  To: Francisco Jordan MD    elevated creatinine: 1.24    Patient reports that she is drinking 48oz of water per day  Patient started on a probiotic and metamucil. States she continues to have having diarrhea every time she has a BM, however the frequency in which she is having to go has decreased.  Denies alcohol and caffeine intake    Encouraged patient to drink closer to 64 oz of water per day. Please update me with any further recs. Thank you    Marivel Marcelo RN   Transplant Coordinator  [phone]

## 2022-03-25 ENCOUNTER — LAB (OUTPATIENT)
Dept: LAB | Facility: CLINIC | Age: 67
End: 2022-03-25
Attending: INTERNAL MEDICINE
Payer: MEDICARE

## 2022-03-25 DIAGNOSIS — Z48.298 AFTERCARE FOLLOWING ORGAN TRANSPLANT: ICD-10-CM

## 2022-03-25 LAB
ALBUMIN UR-MCNC: NEGATIVE MG/DL
ANION GAP SERPL CALCULATED.3IONS-SCNC: 6 MMOL/L (ref 3–14)
APPEARANCE UR: CLEAR
BACTERIA #/AREA URNS HPF: ABNORMAL /HPF
BILIRUB UR QL STRIP: NEGATIVE
BUN SERPL-MCNC: 15 MG/DL (ref 7–30)
CALCIUM SERPL-MCNC: 8.8 MG/DL (ref 8.5–10.1)
CHLORIDE BLD-SCNC: 109 MMOL/L (ref 94–109)
CO2 SERPL-SCNC: 25 MMOL/L (ref 20–32)
COLOR UR AUTO: ABNORMAL
CREAT SERPL-MCNC: 1.12 MG/DL (ref 0.52–1.04)
GFR SERPL CREATININE-BSD FRML MDRD: 55 ML/MIN/1.73M2
GLUCOSE BLD-MCNC: 122 MG/DL (ref 70–99)
GLUCOSE UR STRIP-MCNC: NEGATIVE MG/DL
HGB UR QL STRIP: NEGATIVE
HYALINE CASTS: 1 /LPF
KETONES UR STRIP-MCNC: NEGATIVE MG/DL
LEUKOCYTE ESTERASE UR QL STRIP: ABNORMAL
MUCOUS THREADS #/AREA URNS LPF: PRESENT /LPF
NITRATE UR QL: NEGATIVE
PH UR STRIP: 5.5 [PH] (ref 5–7)
POTASSIUM BLD-SCNC: 3.7 MMOL/L (ref 3.4–5.3)
RBC URINE: 1 /HPF
SODIUM SERPL-SCNC: 140 MMOL/L (ref 133–144)
SP GR UR STRIP: 1.01 (ref 1–1.03)
UROBILINOGEN UR STRIP-MCNC: NORMAL MG/DL
WBC URINE: 10 /HPF

## 2022-03-25 PROCEDURE — 36415 COLL VENOUS BLD VENIPUNCTURE: CPT

## 2022-03-25 PROCEDURE — 80048 BASIC METABOLIC PNL TOTAL CA: CPT

## 2022-03-25 PROCEDURE — 81001 URINALYSIS AUTO W/SCOPE: CPT

## 2022-03-26 ENCOUNTER — LAB (OUTPATIENT)
Dept: LAB | Facility: CLINIC | Age: 67
End: 2022-03-26
Payer: MEDICARE

## 2022-03-26 DIAGNOSIS — Z48.298 AFTERCARE FOLLOWING ORGAN TRANSPLANT: ICD-10-CM

## 2022-03-26 PROCEDURE — 82653 EL-1 FECAL QUANTITATIVE: CPT

## 2022-03-28 ENCOUNTER — TELEPHONE (OUTPATIENT)
Dept: GASTROENTEROLOGY | Facility: CLINIC | Age: 67
End: 2022-03-28
Payer: MEDICARE

## 2022-03-28 ENCOUNTER — HOSPITAL ENCOUNTER (OUTPATIENT)
Facility: CLINIC | Age: 67
End: 2022-03-28
Attending: COLON & RECTAL SURGERY | Admitting: COLON & RECTAL SURGERY
Payer: MEDICARE

## 2022-03-28 DIAGNOSIS — Z12.11 ENCOUNTER FOR SCREENING COLONOSCOPY: Primary | ICD-10-CM

## 2022-03-28 DIAGNOSIS — Z11.59 ENCOUNTER FOR SCREENING FOR OTHER VIRAL DISEASES: Primary | ICD-10-CM

## 2022-03-28 NOTE — TELEPHONE ENCOUNTER
Screening Questions  BlueKIND OF PREP RedLOCATION [review exclusion criteria] GreenSEDATION TYPE  1. Have you had a positive covid test in the last 90 days? N     2. Are you active on mychart? Y    3. What insurance is in the chart? MEDICARE AND AARP     3.   Ordering/Referring Provider: Francisco Jordan MD     4. BMI 35.7 [BMI OVER 40-EXTENDED PREP]  If greater than 40 review exclusion criteria [PAC APPT IF @ [facility]]        5.  Respiratory Screening :  [If yes to any of the following HOSPITAL setting only]     Do you use daily home oxygen? N    Do you have mod to severe Obstructive Sleep Apnea? N  [OKAY @ [facility] SH PH RI]   Do you have Pulmonary Hypertension? N     Do you have UNCONTROLLED asthma? N        6.   Have you had a heart or lung transplant? N      7.   Are you currently on dialysis? N [ If yes, G-PREP & HOSPITAL setting only]     8.   Do you have chronic kidney disease? N-HAS HAD A KIDNEY TRANSPLANT IN 2014 [ If yes, G-PREP ]    9.   Have you had a stroke or Transient ischemic attack (TIA - aka  mini stroke ) within 6 months?  N (If yes, please review exclusion criteria)    10.   In the past 6 months, have you had any heart related issues including cardiomyopathy or heart attack? N           If yes, did it require cardiac stenting or other implantable device? N      11.   Do you have any implantable devices in your body (pacemaker, defib, LVAD)? N (If yes, please review exclusion criteria)    12.   Do you take nitroglycerin? N           If yes, how often? N  (if yes, HOSPITAL setting ONLY)    13.   Are you currently taking any blood thinners? FANNY MADISON           [IF YES, INFORM PATIENT TO FOLLOW UP W/ ORDERING PROVIDER FOR BRIDGING INSTRUCTIONS]     14.   Do you have a diagnosis of diabetes? Y TYPE 2   [ If yes, G-PREP ]    15.   [FEMALES] Are you currently pregnant?     If yes, how many weeks?     16.   Are you taking any prescription pain medications on a routine schedule?  N  [ If yes, EXTENDED  PREP.] [If yes, MAC]    17.   Do you have any chemical dependencies such as alcohol, street drugs, or methadone?  N [If yes, MAC]    18.   Do you have any history of post-traumatic stress syndrome, severe anxiety or history of psychosis? PTSD  [If yes, MAC]    19.   Do you have a significant intellectual disability?  N [If yes, MAC]    20.   Do you transfer independently?  Y    21.  On a regular basis do you go 3-5 days between bowel movements? N   [ If yes, EXTENDED PREP.]    22.   Preferred LOCAL Pharmacy for Pre Prescription   [id number] - [place], [place] - [address] AT [facility] [address]      Scheduling Details      Caller : Ysabel  (Please ask for phone number if not scheduled by patient)    Type of Procedure Scheduled: Colon  Which Colonoscopy Prep was Sent?: mirlax  KHORUTS CF PATIENTS & GROEN'S PATIENTS NEEDS EXTENDED PREP  Surgeon: Lanny  Date of Procedure: 4/27/22  [facility]      Sedation Type: Mac  Conscious Sedation- Needs  for 6 hours after the procedure  MAC/General-Needs  for 24 hours after procedure    Pre-op Required at [facility] [place] [place] and OR for MAC sedation: y they will set up with PCP  (advise patient they will need a pre-op prior to procedure -)      Informed patient they will need an adult  y  Cannot take any type of public or medical transportation alone    Pre-Procedure Covid test to be completed at [facility] or Externally: [facility] 4/23    Confirmed Nurse will call to complete assessment y    Additional comments:

## 2022-03-30 ENCOUNTER — TELEPHONE (OUTPATIENT)
Dept: TRANSPLANT | Facility: CLINIC | Age: 67
End: 2022-03-30
Payer: MEDICARE

## 2022-03-30 DIAGNOSIS — K86.89 PANCREATIC INSUFFICIENCY: Primary | ICD-10-CM

## 2022-03-30 LAB — ELASTASE PANC STL-MCNT: 96.2 UG/G

## 2022-03-30 NOTE — TELEPHONE ENCOUNTER
ISSUE/PLAN:  ----- Message from Francisco Jordan MD sent at 3/30/2022  1:13 PM CDT -----  Thank you so much! Marivel, can you please let her know? Have her keep us posted about the diarrhea. If it improves we would be able to cancel the colonoscopy.   ----- Message -----  From: Jose Joya MD  Sent: 3/30/2022   1:12 PM CDT  To: Francisco Jordan MD, Marivel Marcelo RN    Sure thing. I must not have asked her about it when I saw her a few weeks back.  I'll send a prescription to her listed pharmacy if someone can let her know.     Jose  ----- Message -----  From: Francisco Jordan MD  Sent: 3/30/2022   1:00 PM CDT  To: Jose Joya MD, [address] I'm the transplant nephrologist taking care of Ms. Palma. I have been doing a workup for diarrhea on her and I sent a fecal elastase because of her pancreatic cysts/her description of the diarrhea and it appears that she has severe exocrine pancreatic insufficiency. Would you be willing to prescribe Creon or another pancreatic enzyme? If her diarrhea improves with this we would be able to cancel her upcoming colonoscopy. Thanks for considering.     OUTCOME:  Patient v/u of starting creon tid with meal.  Will follow up with patient to assess diarrhea next week.    Marivel Marcelo RN   Transplant Coordinator  [phone]

## 2022-03-31 ENCOUNTER — TRANSFERRED RECORDS (OUTPATIENT)
Dept: HEALTH INFORMATION MANAGEMENT | Facility: CLINIC | Age: 67
End: 2022-03-31

## 2022-03-31 NOTE — TELEPHONE ENCOUNTER
RECORDS RECEIVED FROM: Internal   REASON FOR VISIT: benign essential tremor    Date of Appt: 6/24/22   NOTES (FOR ALL VISITS) STATUS DETAILS   OFFICE NOTE from referring provider Internal Dr Horacio Sheridan @ [facility] Internal Med:  3/18/22   OFFICE NOTE from other specialist Internal Dr Jacome @ [facility] Neurology:  6/1/21 1/14/21 12/11/20    Dr Borja @ [facility] Neurology:  4/23/19  4/10/19   MEDICATION LIST Internal    IMAGING  (FOR ALL VISITS)     MRI (HEAD, NECK, SPINE) Internal [facility] [place]:  MRI Lumbar Spine 6/19/21    [facility]:  MRI Cervical Spine 4/22/19  MRI Brain 4/22/19   CT (HEAD, NECK, SPINE) Internal [facility] [place]:  CT Lumbar Spine 6/18/21

## 2022-04-11 ENCOUNTER — TRANSFERRED RECORDS (OUTPATIENT)
Dept: HEALTH INFORMATION MANAGEMENT | Facility: CLINIC | Age: 67
End: 2022-04-11
Payer: MEDICARE

## 2022-04-15 ENCOUNTER — TELEPHONE (OUTPATIENT)
Dept: OPHTHALMOLOGY | Facility: CLINIC | Age: 67
End: 2022-04-15
Payer: MEDICARE

## 2022-04-15 NOTE — TELEPHONE ENCOUNTER
Called and spoke to Elizabeth     Made her an orthoptics appointment for 4/25 @ 830 am     Pt has been experiencing double vision with her new eyeglasses. Elizabeth  and the technician think its because Elizabeth does not have a prism in her glasses       Janet Ruiz Communication Facilitator on 4/15/2022 at 3:21 PM

## 2022-04-18 NOTE — PROGRESS NOTES
[facility]  [address] [place] [address], [address]  [facility] [address]  Phone: [phone]  Primary Provider: Horacio Sheridan        PREOPERATIVE EVALUATION:  Today's date: 4/19/2022    Elizabeth Palma is a 66 year old female who presents for a preoperative evaluation.    Surgical Information:  Surgery/Procedure: COLONOSCOPY  Surgery Location: [facility]  Surgeon: Lanny  Surgery Date: 4/27/22  Time of Surgery: 12:3pm  Where patient plans to recover: At home with family  Fax number for surgical facility: Note does not need to be faxed, will be available electronically in Epic.    Type of Anesthesia Anticipated: General    Assessment & Plan     The proposed surgical procedure is considered LOW risk.    Preop general physical exam  We'll check an a1c, unlikely to impact procedure.  Hold eliquis as below.    - EKG 12-lead complete w/read - Clinics    DM type 2 with Neuropathy, Hgb A1C 7.3 on 1/25/21    - Hemoglobin A1c           Risks and Recommendations:  The patient has the following additional risks and recommendations for perioperative complications:   - No identified additional risk factors other than previously addressed    Medication Instructions:   - apixaban (Eliquis), edoxaban (Savaysa), rivaroxaban (Xarelto): Bleeding risk is moderate or high for this procedure AND CrCl  (>=) 50 mL/min. HOLD 2 days before surgery.     RECOMMENDATION:  APPROVAL GIVEN to proceed with proposed procedure, without further diagnostic evaluation.            Subjective     HPI related to upcoming procedure:   Medically complex individual with ESRD (a/p transplant), DM, obesity and LION (plus asthma) who is planning a colonoscopy.      No chest pain, palpitations, exertional dyspnea or orthopnea    She is on eliquis for past thromboembolic disease (PE).    Has asthma but not using any regular inhalers.        Preop Questions 4/19/2022   1. Have you ever had a heart attack or stroke? No   2. Have  you ever had surgery on your heart or blood vessels, such as a stent placement, a coronary artery bypass, or surgery on an artery in your head, neck, heart, or legs? No   3. Do you have chest pain with activity? No   4. Do you have a history of  heart failure? No   5. Do you currently have a cold, bronchitis or symptoms of other infection? No   6. Do you have a cough, shortness of breath, or wheezing? No   7. Do you or anyone in your family have previous history of blood clots? YES - (on eliquis)   8. Do you or does anyone in your family have a serious bleeding problem such as prolonged bleeding following surgeries or cuts? No   9. Have you ever had problems with anemia or been told to take iron pills? YES -    10. Have you had any abnormal blood loss such as black, tarry or bloody stools, or abnormal vaginal bleeding? No   11. Have you ever had a blood transfusion? No   12. Are you willing to have a blood transfusion if it is medically needed before, during, or after your surgery? Yes   13. Have you or any of your relatives ever had problems with anesthesia? YES - sister    14. Do you have sleep apnea, excessive snoring or daytime drowsiness? UNKNOWN - Asthma   15. Do you have any artifical heart valves or other implanted medical devices like a pacemaker, defibrillator, or continuous glucose monitor? No   16. Do you have artificial joints? No   17. Are you allergic to latex? No       Preoperative Review of :   reviewed - controlled substances reflected in medication list.      Review of Systems  Constitutional, neuro, ENT, endocrine, pulmonary, cardiac, gastrointestinal, genitourinary, musculoskeletal, integument and psychiatric systems are negative, except as otherwise noted.    Patient Active Problem List    Diagnosis Date Noted     OP (osteoporosis) T score -3.8 2009     Priority: High     2007 T-score -3.7       Chronic kidney disease, stage V (H) 2022     Priority: Medium     Bacteremia  2021     Priority: Medium     PCKD, S/P Renal Transplant 2014 ([facility]) 2021     Priority: Medium     Intractable back pain 2021     Priority: Medium     UTI 2021     Priority: Medium     Chronic kidney disease, stage 3 (H) 2021     Priority: Medium     Morbid obesity (H) 2021     Priority: Medium     Anemia, iron deficiency 01/10/2020     Priority: Medium     Median sensory neuropathy, left 2019     Priority: Medium     Nerve conduction study down at [facility] in [place] on 3/7/19 shows: mild left median neuropathy at wrist, left median motor distal latency is normal, the left ulnar motor conduction velocity is abnormally slowed       Personal history of other drug therapy 2018     Priority: Medium     Marital conflict 2018     Priority: Medium     Suicidal ideation 2018     Priority: Medium     Narcissistic personality disorder (H) 2018     Priority: Medium     Age-related osteoporosis without current pathological fracture 08/10/2016     Priority: Medium     Right knee pain 2016     Priority: Medium     Plantar fasciitis, bilateral 2016     Priority: Medium     Posttraumatic stress disorder 2015     Priority: Medium     DM type 2 with Neuropathy, Hgb A1C 7.3 on 2015     Priority: Medium     Nightmares associated with chronic post-traumatic stress disorder 10/27/2015     Priority: Medium     Pain in joint involving ankle and foot 2015     Priority: Medium     Senile osteoporosis 2015     Priority: Medium     Hyperparathyroidism (H) 2015     Priority: Medium     Problem list name updated by automated process. Provider to review       Hyperparathyroidism, secondary (H) 2015     Priority: Medium     Secondary hyperparathyroidism (H) 2015     Priority: Medium     -donor kidney transplant 2014     Priority: Medium     Immunosuppressed status (H) 2014     Priority:  Medium     Pain in joint, forearm -- L unhealed Fx 2013     Priority: Medium     CMC DJD(carpometacarpal degenerative joint disease), localized primary 2013     Priority: Medium     Major depressive disorder, recurrent episode, moderate (H) 11/15/2012     Priority: Medium     Generalized anxiety disorder 11/15/2012     Priority: Medium     Premature menopause age 37 07/10/2012     Priority: Medium     OCP (vaginal bldg)-->HT which she stopped 2 mo later documented at 2007 visit (age 51).       Sensory loss 10/17/2011     Priority: Medium     Bottom of feet; uncertain if there is a neuropathy per notes.        Hypertension 10/15/2011     Priority: Medium     Hyperlipidemia 10/15/2011     Priority: Medium     Abnormal MRI, cervical spine 10/15/2011     Priority: Medium     2011; mild changes noted. Study done for left arm symptoms  Impression:   1. Mild multilevel degenerative disc disease with no significant canal  or neural stenosis seen. motion artifact on the STIR images in these  are not interpretable. The remaining images were interpreted       Autosomal dominant polycystic kidney disease 2011     Priority: Medium     Overview:   Overview:   (Problem list name updated by automated process. Provider to review and confirm.)        Past Medical History:   Diagnosis Date     Abnormal MRI, cervical spine 10/15/2011    2011; mild changes noted. Study done for left arm symptoms Impression:  1. Mild multilevel degenerative disc disease with no significant canal or neural stenosis seen. motion artifact on the STIR images in these are not interpretable. The remaining images were interpreted      Autosomal dominant polycystic kidney disease 2011     (Problem list name updated by automated process. Provider to review and confirm.)     CMC DJD(carpometacarpal degenerative joint disease), localized primary 2013     -donor kidney transplant 2014     Gastroesophageal  reflux disease      Generalized anxiety disorder 11/15/2012     Glaucoma      Hyperlipidemia 10/15/2011     Hyperparathyroidism, secondary (H) 02/25/2015     Hypertension     resolved     Immunosuppressed status (H) 03/20/2014     Major depressive disorder, recurrent episode, moderate (H) 11/15/2012     Obesity (BMI 30-39.9)      OP (osteoporosis) T score -3.8 09/21/2009 2007 T-score -3.7      LION (obstructive sleep apnoea) 10/15/2012    reported intolerant to CPAP -- she says she doesn't have LION     Pain in joint, forearm -- L unhealed Fx 05/21/2013     Premature menopause age 37 07/10/2012    OCP (vaginal bldg)-->HT which she stopped 2 mo later documented at Jan 12, 2007 visit (age 51).      Restless leg syndrome      Stiffness of joint, not elsewhere classified, hand 03/05/2013     Tremor 10/15/2011    head     Type 2 DM with Neuropathy 1985    started with gestational diabetes     Uncomplicated asthma      Past Surgical History:   Procedure Laterality Date     ABDOMEN SURGERY       ANKLE SURGERY       C/SECTION, LOW TRANSVERSE      x 2     CHOLECYSTECTOMY  1990     COLONOSCOPY       ESOPHAGOSCOPY, GASTROSCOPY, DUODENOSCOPY (EGD), COMBINED N/A 05/19/2015    Procedure: COMBINED ESOPHAGOSCOPY, GASTROSCOPY, DUODENOSCOPY (EGD);  Surgeon: Sky Davey MD;  Location:  GI     ESOPHAGOSCOPY, GASTROSCOPY, DUODENOSCOPY (EGD), COMBINED N/A 05/19/2015    Procedure: COMBINED ESOPHAGOSCOPY, GASTROSCOPY, DUODENOSCOPY (EGD), BIOPSY SINGLE OR MULTIPLE;  Surgeon: Sky Davey MD;  Location:  GI     EYE SURGERY       LAPAROSCOPY, SURGICAL; REPAIR INCISIONAL OR VENTRAL HERNIA       LASER SURGERY OF EYE Left 10/01/2020    sever vitreous strands     ORTHOPEDIC SURGERY       HERMINIA EN Y BOWEL  1990     WRIST SURGERY       [facility] TRANSPLANTATION OF KIDNEY  03/2014     Current Outpatient Medications   Medication Sig Dispense Refill     acetaminophen (TYLENOL) 500 MG tablet Take 1,500 mg by mouth every 6 hours as  needed for mild pain       acetylcysteine (N-ACETYL CYSTEINE) 600 MG CAPS capsule Take 1 capsule (600 mg) by mouth 2 times daily (Patient does not know what strength they are taking.) 60 capsule 3     albuterol (PROAIR HFA/PROVENTIL HFA/VENTOLIN HFA) 108 (90 Base) MCG/ACT inhaler Inhale 2 puffs into the lungs every 6 hours as needed for shortness of breath / dyspnea or wheezing 8.5 g 3     amylase-lipase-protease (CREON 24) 38249-78324 units CPEP per EC capsule Take 3 capsules by mouth 3 times daily (with meals) 810 capsule 11     ARIPiprazole (ABILIFY) 2 MG tablet Take 1.5 tablets (3 mg) by mouth 2 times daily 45 tablet 3     Cholecalciferol (VITAMIN D) 1000 UNITS capsule 1,000 Units 30 capsule      cyanocolbalamin (VITAMIN  B-12) 1000 MCG tablet Take 1 tablet by mouth daily. 30 tablet 0     cycloSPORINE modified (GENERIC EQUIVALENT) 25 MG capsule Take 5 capsules (125 mg) by mouth 2 times daily TAKE 5 CAPSULES (125MG) BY MOUTH TWO TIMES A  capsule 11     dextromethorphan (DELSYM) 30 MG/5ML liquid Take 10 mLs (60 mg) by mouth 2 times daily 148 mL 1     ferrous sulfate (FEROSUL) 325 (65 Fe) MG tablet Take 1 tablet (325 mg) by mouth daily (with breakfast) 100 tablet 0     fluticasone (FLONASE) 50 MCG/ACT nasal spray Spray 1 spray into both nostrils daily (Patient taking differently: Spray 1 spray into both nostrils daily as needed) 48 g 3     fluticasone (FLOVENT HFA) 220 MCG/ACT inhaler Inhale 1 puff into the lungs 2 times daily 12 g 3     furosemide (LASIX) 40 MG tablet Take 1 tablet (40 mg) by mouth daily 90 tablet 3     gabapentin (NEURONTIN) 300 MG capsule Take 2 capsules (600 mg) by mouth At Bedtime 180 capsule 2     latanoprost (XALATAN) 0.005 % ophthalmic solution Place 1 drop into both eyes At Bedtime 7.5 mL 4     levalbuterol (XOPENEX HFA) 45 MCG/ACT inhaler Inhale 2 puffs into the lungs every 6 hours as needed for shortness of breath / dyspnea or wheezing 15 g 3     LORazepam (ATIVAN) 0.5 MG tablet  Take 1 tab (0.5mg) twice weekly as need for anxiety 30 tablet 0     metFORMIN (GLUCOPHAGE-XR) 500 MG 24 hr tablet Take 3 tablets (1,500 mg) by mouth daily (with dinner) 270 tablet 1     mycophenolic acid (GENERIC EQUIVALENT) 180 MG EC tablet Take 3 tablets (540 mg) by mouth 2 times daily 180 tablet 11     nystatin (MYCOSTATIN) 822636 UNIT/GM external cream Apply topically 2 times daily To toenails. 90 g 5     ondansetron (ZOFRAN-ODT) 4 MG ODT tab Take 1 tablet (4 mg) by mouth every 6 hours as needed for nausea 20 tablet 4     order for DME Walker with front wheels and a seat. 1 Units 0     pantoprazole (PROTONIX) 40 MG EC tablet Take 1 tablet (40 mg) by mouth daily 90 tablet 3     Polyvinyl Alcohol-Povidone (REFRESH OP) Apply to eye as needed Both eyes       prazosin (MINIPRESS) 2 MG capsule Take 1 capsule (2 mg) by mouth At Bedtime 30 capsule 3     prazosin (MINIPRESS) 2 MG capsule Take 2mg caps with 3 x 5mg caps at bedtime for total dose of 17mg. 30 capsule 3     prazosin (MINIPRESS) 2 MG capsule TAKE 1 CAPSULE ALONG WITH THREE 5MG CAPSULES(15MG) EVERY NIGHT AT BEDTIME FOR A TOTAL DAILY DOSE OF 17MG 90 capsule 1     prazosin (MINIPRESS) 5 MG capsule Take 3 x 5mg (15mg) caps + 1 x 2mg caps at bedtime (total dose=17mg) 90 capsule 3     propranolol (INDERAL) 20 MG tablet Take 40 (2 tablets) mg in the morning, 20 mg (1 tablet) in the afternoon, and 20 mg (1 tablet) in the evening. 360 tablet 1     pseudoePHEDrine-guaiFENesin (MUCINEX D)  MG 12 hr tablet Take 1 tablet by mouth every 12 hours 28 tablet 1     pseudoePHEDrine-guaiFENesin (MUCINEX D)  MG 12 hr tablet Take 1 tablet by mouth every 12 hours 28 tablet 1     simvastatin (ZOCOR) 20 MG tablet Take 1 tablet (20 mg) by mouth At Bedtime 90 tablet 0     sulfamethoxazole-trimethoprim (BACTRIM) 400-80 MG tablet Take 1 tablet by mouth daily 90 tablet 3     topiramate (TOPAMAX) 200 MG tablet Take 1 tablet (200 mg) by mouth 2 times daily 60 tablet 11      Vaginal Lubricant (REPLENS) GEL Use vaginally as needed. Can use up to 3 times per week. 35 g 11     vilazodone (VIIBRYD) 10 MG TABS tablet Take 10mg tab with 40mg tab for total daily dose of 50 mg 30 tablet 3     vilazodone (VIIBRYD) 40 MG TABS tablet Take 40mg tab with 10mg tab for total daily dose of 50 mg 30 tablet 3       Allergies   Allergen Reactions     Percocet [Oxycodone-Acetaminophen] Nausea and Vomiting     Novocain [Procaine Hcl] Hives     Had reaction 25 years ago to old renetta. Pt reports multiple injections of lidocaine since then without reaction.  Tolerated lidocaine injection today without difficulty.  Osmar Mark MD IR Service.        Social History     Tobacco Use     Smoking status: Never Smoker     Smokeless tobacco: Never Used   Substance Use Topics     Alcohol use: No     Alcohol/week: 0.0 standard drinks       History   Drug Use No         Objective     /83 (BP Location: Left arm, Patient Position: Sitting, Cuff Size: Adult Large)   Pulse 54   Temp 97.1  F (36.2  C) (Temporal)   Resp 20   Wt 87.5 kg (192 lb 14.4 oz)   LMP  (LMP Unknown)   SpO2 97%   Breastfeeding No   BMI 36.45 kg/m      Physical Exam    GENERAL APPEARANCE: healthy, alert and no distress     EYES: EOMI,  PERRL     HENT: ear canals and TM's normal and nose and mouth without ulcers or lesions     NECK: no adenopathy, no asymmetry, masses, or scars and thyroid normal to palpation     RESP: lungs clear to auscultation - no rales, rhonchi or wheezes     CV: regular rates and rhythm, normal S1 S2, no S3 or S4 and no murmur, click or rub     ABDOMEN:  soft, nontender, no HSM or masses and bowel sounds normal     MS: extremities normal- no gross deformities noted, no evidence of inflammation in joints, FROM in all extremities.     SKIN: no suspicious lesions or rashes     NEURO: Normal strength and tone, sensory exam grossly normal, mentation intact and speech normal     PSYCH: mentation appears normal. and affect  normal/bright     LYMPHATICS: No cervical adenopathy    Recent Labs   Lab Test 03/25/22  1659 03/21/22  0914 02/28/22  1007 06/29/21  1042 06/19/21  0748 02/11/21  0928 01/25/21  1117   HGB  --  14.0 13.7   < > 11.4*   < > 15.1   PLT  --  176 170   < > 163   < > 202    141 136   < > 139   < > 136   POTASSIUM 3.7 4.5 4.1   < > 3.6   < > 3.6   CR 1.12* 1.24* 1.14*   < > 0.98   < > 1.20*   A1C  --   --   --   --  6.9*  --  7.3*    < > = values in this interval not displayed.        Diagnostics:  Labs pending at this time.  Results will be reviewed when available.   EKG: appears normal, NSR, normal axis, normal intervals, no acute ST/T changes c/w ischemia, no LVH by voltage criteria, unchanged from previous tracings    Revised Cardiac Risk Index (RCRI):  The patient has the following serious cardiovascular risks for perioperative complications:   - No serious cardiac risks = 0 points     RCRI Interpretation: 0 points: Class I (very low risk - 0.4% complication rate)           Signed Electronically by: Gregory Ramirez MD  Copy of this evaluation report is provided to requesting physician.

## 2022-04-19 ENCOUNTER — OFFICE VISIT (OUTPATIENT)
Dept: PULMONOLOGY | Facility: CLINIC | Age: 67
End: 2022-04-19
Attending: STUDENT IN AN ORGANIZED HEALTH CARE EDUCATION/TRAINING PROGRAM
Payer: MEDICARE

## 2022-04-19 ENCOUNTER — OFFICE VISIT (OUTPATIENT)
Dept: FAMILY MEDICINE | Facility: CLINIC | Age: 67
End: 2022-04-19
Payer: MEDICARE

## 2022-04-19 VITALS
HEART RATE: 54 BPM | OXYGEN SATURATION: 97 % | SYSTOLIC BLOOD PRESSURE: 124 MMHG | RESPIRATION RATE: 20 BRPM | TEMPERATURE: 97.1 F | BODY MASS INDEX: 36.45 KG/M2 | DIASTOLIC BLOOD PRESSURE: 83 MMHG | WEIGHT: 192.9 LBS

## 2022-04-19 VITALS
TEMPERATURE: 97.1 F | DIASTOLIC BLOOD PRESSURE: 83 MMHG | HEART RATE: 54 BPM | WEIGHT: 192.9 LBS | SYSTOLIC BLOOD PRESSURE: 124 MMHG | OXYGEN SATURATION: 97 % | RESPIRATION RATE: 20 BRPM | HEIGHT: 61 IN | BODY MASS INDEX: 36.42 KG/M2

## 2022-04-19 DIAGNOSIS — E11.42 TYPE 2 DIABETES MELLITUS WITH PERIPHERAL NEUROPATHY (H): ICD-10-CM

## 2022-04-19 DIAGNOSIS — Z01.818 PREOP GENERAL PHYSICAL EXAM: Primary | ICD-10-CM

## 2022-04-19 DIAGNOSIS — J45.40 MODERATE PERSISTENT ASTHMA WITHOUT COMPLICATION: Primary | ICD-10-CM

## 2022-04-19 LAB — HBA1C MFR BLD: 7.3 % (ref 0–5.6)

## 2022-04-19 PROCEDURE — 93000 ELECTROCARDIOGRAM COMPLETE: CPT | Performed by: INTERNAL MEDICINE

## 2022-04-19 PROCEDURE — 36415 COLL VENOUS BLD VENIPUNCTURE: CPT | Performed by: INTERNAL MEDICINE

## 2022-04-19 PROCEDURE — 99214 OFFICE O/P EST MOD 30 MIN: CPT | Mod: GC | Performed by: STUDENT IN AN ORGANIZED HEALTH CARE EDUCATION/TRAINING PROGRAM

## 2022-04-19 PROCEDURE — 99214 OFFICE O/P EST MOD 30 MIN: CPT | Performed by: INTERNAL MEDICINE

## 2022-04-19 PROCEDURE — 83036 HEMOGLOBIN GLYCOSYLATED A1C: CPT | Performed by: INTERNAL MEDICINE

## 2022-04-19 PROCEDURE — G0463 HOSPITAL OUTPT CLINIC VISIT: HCPCS

## 2022-04-19 ASSESSMENT — PAIN SCALES - GENERAL: PAINLEVEL: SEVERE PAIN (6)

## 2022-04-19 NOTE — PATIENT INSTRUCTIONS
Your asthma appears relatively well controlled. I would like you to continue Qvar every day, regardless of how you are feeling, rinse your mouth out after. Use xopenex as needed for worsening shortness of breath and wheezing.

## 2022-04-19 NOTE — PROGRESS NOTES
Pulmonary Clinic Note    Date of Service: 4/19/2022    Chief Complaint   Patient presents with     RECHECK     Return Dyspnea        A/P:  66F HTN, HLD, GERD, obesity, ADPKD s/p txp (cyclosporine, mycophenolate) being seen for f/u asthma.    # Mild Persistent Asthma:  She reports improved control since her last visit in January. Using her rescue inhaler a few times a week and notes nocturnal symptoms one night per week. She was only using her controller inhaler a few times a week, so we reinforced daily controller inhaler use and will see back in clinic in 6 months to assess control at that time. No significant UACS-type symptoms. Already on a PPI for GERD.  - Continue Qvar daily. Provided education on importance of using her controller inhaler every day.   - Continue levalbuterol PRN  - Follow up in 6 months. If she remains stable to improved at that time, would consider stopping ICS, trialing on PRN levalbuterol, and sending back to primary care.    # Healthcare maintenance: COVID vaccinated x3 (Pfizer), last on 8/15/21. She is working on getting a second booster shot. Gets flu shot annually.    RTC in 6 months.    Discussed with Dr. Mathias.    Kendell Breen  Internal Medicine Resident, PGY-2    [facility]  PULMONARY STAFF NOTE    Date of Service: 04/19/22    1. Mild persistent asthma - weekly nocturnal symptoms. Not using inhalers as prescribed. Counseled on proper use of ICS and prn ROBERT. Will continue this therapy and re-assess in 6mo. If properly controlled, will discharge to primary care.     35 minutes spent reviewing chart, reviewing test results, talking with and examining patient, formulating plan, and documentation on the day of the encounter, excluding time with the resident.    Juan Mathias MD  Pulmonary Disease and Critical Care Medicine  [facility]     History:  66F HTN, HLD, GERD, obesity, ADPKD s/p txp (cyclosporine, mycophenolate) being seen for f/u asthma. Last seen  "1/18/22 at which time she was prescribed fluticasone.       Since January, she reports she is doing better. She uses the levalbuterol a couple times per week, which she has tolerated much better than the albuterol (on which she noticed she felt jittery). She reports nocturnal symptoms waking her up about once per week. She has been using Qvar (not Flovent) once per week. She notices wheezing, especially at night or in the morning. No pets, mold or water damage. They have a humidifier that they use, mainly in the winter. They have a \"non-cleaning\" humidifier and have had it for a few years. Never smoker. Denies sinus congestion or a runny nose. She notes significant reflux symptoms, for which she takes pantoprazole.    Pt admitted to [facility] 1/21-1/22/22 for PE. Started on apixaban. She reports her breathing has significantly improved since then. She was briefly on oxygen in the hospital, but hasn't been on anything since then. She has been working with physical therapy for her back, but otherwise hasn't been too active.    Works as a teacher, teaches online Bengali. Never smoker. No MJ or vaping. Originally from [place], lives in [place] now. No obvious exposures.                         10 point review of systems negative, aside from that mentioned in HPI.    /83   Pulse 54   Temp 97.1  F (36.2  C)   Resp 20   Ht 1.549 m (5' 1\")   Wt 87.5 kg (192 lb 14.4 oz)   LMP  (LMP Unknown)   SpO2 97%   BMI 36.45 kg/m    Gen: well-appearing, no acute distress  HEENT: no pharyngeal erythema or exudate  Card: RRR, no murmurs appreciated  Pulm: CTAB, no wheezes or crackles  Abd: soft, non-tender, non-distended  MSK: no edema  Skin: no obvious rash  Psych: normal affect  Neuro: alert and oriented     Labs:  Personally reviewed    Imaging/Studies: Personally reviewed  CXR (1/2022) - no acute process  PFTs (10/2021) - normal pulmonary function    Past Medical History:   Diagnosis Date     Abnormal MRI, cervical spine " 10/15/2011    2011; mild changes noted. Study done for left arm symptoms Impression:  1. Mild multilevel degenerative disc disease with no significant canal or neural stenosis seen. motion artifact on the STIR images in these are not interpretable. The remaining images were interpreted      Autosomal dominant polycystic kidney disease 2011     (Problem list name updated by automated process. Provider to review and confirm.)     CMC DJD(carpometacarpal degenerative joint disease), localized primary 2013     -donor kidney transplant 2014     Gastroesophageal reflux disease      Generalized anxiety disorder 11/15/2012     Glaucoma      Hyperlipidemia 10/15/2011     Hyperparathyroidism, secondary (H) 2015     Hypertension     resolved     Immunosuppressed status (H) 2014     Major depressive disorder, recurrent episode, moderate (H) 11/15/2012     Obesity (BMI 30-39.9)      OP (osteoporosis) T score -3.8 2009 T-score -3.7      LION (obstructive sleep apnoea) 10/15/2012    reported intolerant to CPAP -- she says she doesn't have LION     Pain in joint, forearm -- L unhealed Fx 2013     Premature menopause age 37 07/10/2012    OCP (vaginal bldg)-->HT which she stopped 2 mo later documented at 2007 visit (age 51).      Restless leg syndrome      Stiffness of joint, not elsewhere classified, hand 2013     Tremor 10/15/2011    head     Type 2 DM with Neuropathy     started with gestational diabetes     Uncomplicated asthma      Past Surgical History:   Procedure Laterality Date     ABDOMEN SURGERY       ANKLE SURGERY       C/SECTION, LOW TRANSVERSE      x 2     CHOLECYSTECTOMY       COLONOSCOPY       ESOPHAGOSCOPY, GASTROSCOPY, DUODENOSCOPY (EGD), COMBINED N/A 2015    Procedure: COMBINED ESOPHAGOSCOPY, GASTROSCOPY, DUODENOSCOPY (EGD);  Surgeon: Sky Davey MD;  Location:  GI     ESOPHAGOSCOPY, GASTROSCOPY, DUODENOSCOPY (EGD),  COMBINED N/A 05/19/2015    Procedure: COMBINED ESOPHAGOSCOPY, GASTROSCOPY, DUODENOSCOPY (EGD), BIOPSY SINGLE OR MULTIPLE;  Surgeon: Sky Davey MD;  Location:  GI     EYE SURGERY       LAPAROSCOPY, SURGICAL; REPAIR INCISIONAL OR VENTRAL HERNIA       LASER SURGERY OF EYE Left 10/01/2020    sever vitreous strands     ORTHOPEDIC SURGERY       HERMINIA EN Y BOWEL  1990     WRIST SURGERY       ZZC TRANSPLANTATION OF KIDNEY  03/2014     Family History   Problem Relation Age of Onset     Mental Illness Other         family hx     Heart Disease Other      Diabetes Other      Cancer Other      Genetic Disorder Father      Mental Illness Father      Diabetes Father      Hypertension Father      Hyperlipidemia Mother      Diabetes Mother      Hypertension Mother      Mental Illness Other      Diabetes Other      Glaucoma No family hx of      Macular Degeneration No family hx of      Social History     Socioeconomic History     Marital status:      Spouse name: Rahat     Number of children: 2     Years of education: Not on file     Highest education level: Not on file   Occupational History     Comment: part-time   Tobacco Use     Smoking status: Never Smoker     Smokeless tobacco: Never Used   Substance and Sexual Activity     Alcohol use: No     Alcohol/week: 0.0 standard drinks     Drug use: No     Sexual activity: Yes     Partners: Male     Birth control/protection: None     Comment: 1 partner   Other Topics Concern     Parent/sibling w/ CABG, MI or angioplasty before 65F 55M? 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)      Social Determinants of Health     Financial Resource Strain: Not on file   Food Insecurity: Not on file   Transportation Needs: Not on file   Physical Activity: Not on file   Stress: Not on file   Social Connections: Not on file   Intimate Partner Violence: Not on file   Housing Stability: Not on file

## 2022-04-19 NOTE — NURSING NOTE
Chief Complaint   Patient presents with     RECHECK     Return Dyspnea     Medications reviewed and vital signs taken.   Pradip Narayan, URI

## 2022-04-19 NOTE — LETTER
4/19/2022     RE: Elizabeth Palma  [address]  [address]  [facility] [address]    Dear Colleague,    Thank you for referring your patient, Elizabeth Palma, to the [facility] [place]. Please see a copy of my visit note below.    Pulmonary Clinic Note    Date of Service: 4/19/2022    Chief Complaint   Patient presents with     RECHECK     Return Dyspnea        A/P:  66F HTN, HLD, GERD, obesity, ADPKD s/p txp (cyclosporine, mycophenolate) being seen for f/u asthma.    # Mild Persistent Asthma:  She reports improved control since her last visit in January. Using her rescue inhaler a few times a week and notes nocturnal symptoms one night per week. She was only using her controller inhaler a few times a week, so we reinforced daily controller inhaler use and will see back in clinic in 6 months to assess control at that time. No significant UACS-type symptoms. Already on a PPI for GERD.  - Continue Qvar daily. Provided education on importance of using her controller inhaler every day.   - Continue levalbuterol PRN  - Follow up in 6 months. If she remains stable to improved at that time, would consider stopping ICS, trialing on PRN levalbuterol, and sending back to primary care.    # Healthcare maintenance: COVID vaccinated x3 (Pfizer), last on 8/15/21. She is working on getting a second booster shot. Gets flu shot annually.    RTC in 6 months.    Discussed with Dr. Mathias.    Kendell Breen  Internal Medicine Resident, PGY-2    [facility]  PULMONARY STAFF NOTE    Date of Service: 04/19/22    1. Mild persistent asthma - weekly nocturnal symptoms. Not using inhalers as prescribed. Counseled on proper use of ICS and prn ROBERT. Will continue this therapy and re-assess in 6mo. If properly controlled, will discharge to primary care.     35 minutes spent reviewing chart, reviewing test results, talking with and examining patient, formulating plan, and  "documentation on the day of the encounter, excluding time with the resident.    Juan Mathias MD  Pulmonary Disease and Critical Care Medicine  [facility]     History:  66F HTN, HLD, GERD, obesity, ADPKD s/p txp (cyclosporine, mycophenolate) being seen for f/u asthma. Last seen 1/18/22 at which time she was prescribed fluticasone.       Since January, she reports she is doing better. She uses the levalbuterol a couple times per week, which she has tolerated much better than the albuterol (on which she noticed she felt jittery). She reports nocturnal symptoms waking her up about once per week. She has been using Qvar (not Flovent) once per week. She notices wheezing, especially at night or in the morning. No pets, mold or water damage. They have a humidifier that they use, mainly in the winter. They have a \"non-cleaning\" humidifier and have had it for a few years. Never smoker. Denies sinus congestion or a runny nose. She notes significant reflux symptoms, for which she takes pantoprazole.    Pt admitted to [facility] 1/21-1/22/22 for PE. Started on apixaban. She reports her breathing has significantly improved since then. She was briefly on oxygen in the hospital, but hasn't been on anything since then. She has been working with physical therapy for her back, but otherwise hasn't been too active.    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 No MJ or vaping. Originally from [place], lives in [place] now. No obvious exposures.                         10 point review of systems negative, aside from that mentioned in HPI.    /83   Pulse 54   Temp 97.1  F (36.2  C)   Resp 20   Ht 1.549 m (5' 1\")   Wt 87.5 kg (192 lb 14.4 oz)   LMP  (LMP Unknown)   SpO2 97%   BMI 36.45 kg/m    Gen: well-appearing, no acute distress  HEENT: no pharyngeal erythema or exudate  Card: RRR, no murmurs appreciated  Pulm: CTAB, no wheezes or crackles  Abd: soft, non-tender, non-distended  MSK: no " edema  Skin: no obvious rash  Psych: normal affect  Neuro: alert and oriented     Labs:  Personally reviewed    Imaging/Studies: Personally reviewed  CXR (2022) - no acute process  PFTs (10/2021) - normal pulmonary function    Past Medical History:   Diagnosis Date     Abnormal MRI, cervical spine 10/15/2011    2011; mild changes noted. Study done for left arm symptoms Impression:  1. Mild multilevel degenerative disc disease with no significant canal or neural stenosis seen. motion artifact on the STIR images in these are not interpretable. The remaining images were interpreted      Autosomal dominant polycystic kidney disease 2011     (Problem list name updated by automated process. Provider to review and confirm.)     CMC DJD(carpometacarpal degenerative joint disease), localized primary 2013     -donor kidney transplant 2014     Gastroesophageal reflux disease      Generalized anxiety disorder 11/15/2012     Glaucoma      Hyperlipidemia 10/15/2011     Hyperparathyroidism, secondary (H) 2015     Hypertension     resolved     Immunosuppressed status (H) 2014     Major depressive disorder, recurrent episode, moderate (H) 11/15/2012     Obesity (BMI 30-39.9)      OP (osteoporosis) T score -3.8 2009 T-score -3.7      LION (obstructive sleep apnoea) 10/15/2012    reported intolerant to CPAP -- she says she doesn't have LION     Pain in joint, forearm -- L unhealed Fx 2013     Premature menopause age 37 07/10/2012    OCP (vaginal bldg)-->HT which she stopped 2 mo later documented at 2007 visit (age 51).      Restless leg syndrome      Stiffness of joint, not elsewhere classified, hand 2013     Tremor 10/15/2011    head     Type 2 DM with Neuropathy     started with gestational diabetes     Uncomplicated asthma      Past Surgical History:   Procedure Laterality Date     ABDOMEN SURGERY       ANKLE SURGERY       C/SECTION, LOW TRANSVERSE      x  2     CHOLECYSTECTOMY  1990     COLONOSCOPY       ESOPHAGOSCOPY, GASTROSCOPY, DUODENOSCOPY (EGD), COMBINED N/A 05/19/2015    Procedure: COMBINED ESOPHAGOSCOPY, GASTROSCOPY, DUODENOSCOPY (EGD);  Surgeon: Sky Davey MD;  Location:  GI     ESOPHAGOSCOPY, GASTROSCOPY, DUODENOSCOPY (EGD), COMBINED N/A 05/19/2015    Procedure: COMBINED ESOPHAGOSCOPY, GASTROSCOPY, DUODENOSCOPY (EGD), BIOPSY SINGLE OR MULTIPLE;  Surgeon: Sky Davey MD;  Location:  GI     EYE SURGERY       LAPAROSCOPY, SURGICAL; REPAIR INCISIONAL OR VENTRAL HERNIA       LASER SURGERY OF EYE Left 10/01/2020    sever vitreous strands     ORTHOPEDIC SURGERY       HERMINIA EN Y BOWEL  1990     WRIST SURGERY       Z TRANSPLANTATION OF KIDNEY  03/2014     Family History   Problem Relation Age of Onset     Mental Illness Other         family hx     Heart Disease Other      Diabetes Other      Cancer Other      Genetic Disorder Father      Mental Illness Father      Diabetes Father      Hypertension Father      Hyperlipidemia Mother      Diabetes Mother      Hypertension Mother      Mental Illness Other      Diabetes Other      Glaucoma No family hx of      Macular Degeneration No family hx of      Social History     Socioeconomic History     Marital status:      Spouse name: Rahat     Number of children: 2     Years of education: Not on file     Highest education level: Not on file   Occupational History     Comment: part-time   Tobacco Use     Smoking status: Never Smoker     Smokeless tobacco: Never Used   Substance and Sexual Activity     Alcohol use: No     Alcohol/week: 0.0 standard drinks     Drug use: No     Sexual activity: Yes     Partners: Male     Birth control/protection: None     Comment: 1 partner   Other Topics Concern     Parent/sibling w/ CABG, MI or angioplasty before 65F 55M? 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)      Social Determinants of Health      Financial Resource Strain: Not on file   Food Insecurity: Not on file   Transportation Needs: Not on file   Physical Activity: Not on file   Stress: Not on file   Social Connections: Not on file   Intimate Partner Violence: Not on file   Housing Stability: Not on file     Again, thank you for allowing me to participate in the care of your patient.      Sincerely,    Juan Mathias MD

## 2022-04-20 ENCOUNTER — TELEPHONE (OUTPATIENT)
Dept: PULMONOLOGY | Facility: CLINIC | Age: 67
End: 2022-04-20
Payer: MEDICARE

## 2022-04-21 ENCOUNTER — TRANSFERRED RECORDS (OUTPATIENT)
Dept: HEALTH INFORMATION MANAGEMENT | Facility: CLINIC | Age: 67
End: 2022-04-21
Payer: MEDICARE

## 2022-04-22 ENCOUNTER — TELEPHONE (OUTPATIENT)
Dept: PULMONOLOGY | Facility: CLINIC | Age: 67
End: 2022-04-22
Payer: MEDICARE

## 2022-04-22 DIAGNOSIS — J45.40 MODERATE PERSISTENT ASTHMA WITHOUT COMPLICATION: Primary | ICD-10-CM

## 2022-04-22 NOTE — CONFIDENTIAL NOTE
Prior Authorization Retail Medication Request    Medication/Dose: Qvar Redihaler 40MCG/ACT  ICD code (if different than what is on RX):    Previously Tried and Failed:    Rationale:      Insurance Name:    Insurance ID:        Pharmacy Information (if different than what is on RX)  Name:    Phone:

## 2022-04-23 ENCOUNTER — LAB (OUTPATIENT)
Dept: LAB | Facility: CLINIC | Age: 67
End: 2022-04-23
Attending: COLON & RECTAL SURGERY

## 2022-04-23 DIAGNOSIS — Z11.59 ENCOUNTER FOR SCREENING FOR OTHER VIRAL DISEASES: ICD-10-CM

## 2022-04-23 LAB — SARS-COV-2 RNA RESP QL NAA+PROBE: POSITIVE

## 2022-04-23 PROCEDURE — U0005 INFEC AGEN DETEC AMPLI PROBE: HCPCS | Performed by: COLON & RECTAL SURGERY

## 2022-04-24 ENCOUNTER — TELEPHONE (OUTPATIENT)
Dept: TRANSPLANT | Facility: CLINIC | Age: 67
End: 2022-04-24
Payer: MEDICARE

## 2022-04-24 DIAGNOSIS — Z94.0 KIDNEY TRANSPLANTED: ICD-10-CM

## 2022-04-24 DIAGNOSIS — Z48.298 AFTERCARE FOLLOWING ORGAN TRANSPLANT: ICD-10-CM

## 2022-04-24 RX ORDER — MYCOPHENOLIC ACID 180 MG/1
180 TABLET, DELAYED RELEASE ORAL 2 TIMES DAILY
Qty: 180 TABLET | Refills: 11 | Status: ON HOLD | COMMUNITY
Start: 2022-04-24 | End: 2022-04-27

## 2022-04-24 NOTE — TELEPHONE ENCOUNTER
Elizabeth paged asking when [facility] would reach out again. She forgot what was said. Reminded that it would be on availability.     Pt paged again stating that she is getting a lot of emails and wondered what she should do with these emails. Asked if they are coming from [facility] and she reports that they are spam. But did remind her to check voicemails as [facility] could be contacting her.

## 2022-04-24 NOTE — TELEPHONE ENCOUNTER
fahad Johnson, that she tested positive for COVID. She was doing test due to upcoming colonoscopy. She has had diarrhea for 3 days. Really bad heartburn started last night and headaches. Had a little shortness of breath this morning, but states that is not unusual for her with her asthma. No fevers.  She is taking  mg BId and myfortic 540 mg BID and protonix 40 mg daily.     Per Dr Urbina patient to decrease myfortic to 180 mg BID. Fill out monoclonal antibody form. If patient has worsening shortness of breath to go to the ED. Pt to follow up with PCP office on Monday. [facility] to check in on patient on Monday per DR Urbina.   Patient aware that the monoclonal antibody form has been filled out and if available they will contact her. Patient repeated to decrease myfortic 180 mg BID and follow up with PCP office.. Did update , Rahat, with dose change. Elizabeth will call back with any changes or concerns.

## 2022-04-25 ENCOUNTER — TELEPHONE (OUTPATIENT)
Dept: GASTROENTEROLOGY | Facility: CLINIC | Age: 67
End: 2022-04-25
Payer: MEDICARE

## 2022-04-25 NOTE — TELEPHONE ENCOUNTER
Caller: Shawn Palma    Procedure: Colon    Date, Location, and Surgeon of Procedure Cancelled: 4/27, [facility]    Ordering Provider:Francisco Jordan MD      Reason for cancel (please be detailed, any staff messages or encounters to note?): Tested positive for Covid on 4/23. No symptoms        Rescheduled: Y     If rescheduled:    Date: 5/11   Location: [facility]   Prep Resent: Y (changes to prep?)   Covid Test Rescheduled: No   Note any change or update to original order/sedation:

## 2022-04-25 NOTE — TELEPHONE ENCOUNTER
Central Prior Authorization Team   Phone: [phone]      PA Initiation    Medication: Qvar Redihaler 40MCG/ACT  Insurance Company: [employer] - Phone [phone] Fax [phone]  Pharmacy Filling the Rx: [id number] - [place], [place] - [address] AT [facility] [address]  Filling Pharmacy Phone: [phone]  Filling Pharmacy Fax:    Start Date: 4/25/2022

## 2022-04-25 NOTE — TELEPHONE ENCOUNTER
Central Prior Authorization Team   Phone: [phone]      I've started the P/A request for patient's Qvar. I need help in answering question #5, if the answer would be yes I would need documentation why. If the answer is no, I could still try this auth or would provider be ok changing to one of the choices listed in question #3 or #4?

## 2022-04-26 ENCOUNTER — HOSPITAL ENCOUNTER (OUTPATIENT)
Facility: CLINIC | Age: 67
Setting detail: OBSERVATION
Discharge: HOME | End: 2022-04-27
Attending: NURSE PRACTITIONER | Admitting: INTERNAL MEDICINE
Payer: MEDICARE

## 2022-04-26 ENCOUNTER — APPOINTMENT (OUTPATIENT)
Dept: GENERAL RADIOLOGY | Facility: CLINIC | Age: 67
End: 2022-04-26
Attending: NURSE PRACTITIONER
Payer: MEDICARE

## 2022-04-26 DIAGNOSIS — Z94.0 DECEASED-DONOR KIDNEY TRANSPLANT: Primary | ICD-10-CM

## 2022-04-26 DIAGNOSIS — R41.82 ALTERED MENTAL STATUS: ICD-10-CM

## 2022-04-26 DIAGNOSIS — Z94.0 HISTORY OF KIDNEY TRANSPLANT: ICD-10-CM

## 2022-04-26 DIAGNOSIS — U07.1 INFECTION DUE TO 2019 NOVEL CORONAVIRUS: ICD-10-CM

## 2022-04-26 DIAGNOSIS — M62.81 GENERALIZED MUSCLE WEAKNESS: ICD-10-CM

## 2022-04-26 LAB
ALBUMIN SERPL-MCNC: 3 G/DL (ref 3.4–5)
ALP SERPL-CCNC: 52 U/L (ref 40–150)
ALT SERPL W P-5'-P-CCNC: 50 U/L (ref 0–50)
ANION GAP SERPL CALCULATED.3IONS-SCNC: 5 MMOL/L (ref 3–14)
AST SERPL W P-5'-P-CCNC: 30 U/L (ref 0–45)
ATRIAL RATE - MUSE: 55 BPM
BASOPHILS # BLD AUTO: 0 10E3/UL (ref 0–0.2)
BASOPHILS NFR BLD AUTO: 1 %
BILIRUB SERPL-MCNC: 0.4 MG/DL (ref 0.2–1.3)
BUN SERPL-MCNC: 16 MG/DL (ref 7–30)
CALCIUM SERPL-MCNC: 8.5 MG/DL (ref 8.5–10.1)
CHLORIDE BLD-SCNC: 112 MMOL/L (ref 94–109)
CO2 SERPL-SCNC: 22 MMOL/L (ref 20–32)
CREAT SERPL-MCNC: 1.21 MG/DL (ref 0.52–1.04)
CRP SERPL-MCNC: <2.9 MG/L (ref 0–8)
D DIMER PPP FEU-MCNC: <0.27 UG/ML FEU (ref 0–0.5)
DIASTOLIC BLOOD PRESSURE - MUSE: NORMAL MMHG
EOSINOPHIL # BLD AUTO: 0 10E3/UL (ref 0–0.7)
EOSINOPHIL NFR BLD AUTO: 1 %
ERYTHROCYTE [DISTWIDTH] IN BLOOD BY AUTOMATED COUNT: 14.1 % (ref 10–15)
GFR SERPL CREATININE-BSD FRML MDRD: 50 ML/MIN/1.73M2
GLUCOSE BLD-MCNC: 175 MG/DL (ref 70–99)
HCT VFR BLD AUTO: 42.1 % (ref 35–47)
HGB BLD-MCNC: 13.5 G/DL (ref 11.7–15.7)
IMM GRANULOCYTES # BLD: 0 10E3/UL
IMM GRANULOCYTES NFR BLD: 1 %
INTERPRETATION ECG - MUSE: NORMAL
LDH SERPL L TO P-CCNC: 161 U/L (ref 81–234)
LYMPHOCYTES # BLD AUTO: 0.5 10E3/UL (ref 0.8–5.3)
LYMPHOCYTES NFR BLD AUTO: 15 %
MCH RBC QN AUTO: 29.7 PG (ref 26.5–33)
MCHC RBC AUTO-ENTMCNC: 32.1 G/DL (ref 31.5–36.5)
MCV RBC AUTO: 93 FL (ref 78–100)
MONOCYTES # BLD AUTO: 0.4 10E3/UL (ref 0–1.3)
MONOCYTES NFR BLD AUTO: 10 %
NEUTROPHILS # BLD AUTO: 2.5 10E3/UL (ref 1.6–8.3)
NEUTROPHILS NFR BLD AUTO: 72 %
NRBC # BLD AUTO: 0 10E3/UL
NRBC BLD AUTO-RTO: 0 /100
P AXIS - MUSE: 72 DEGREES
PLATELET # BLD AUTO: 135 10E3/UL (ref 150–450)
POTASSIUM BLD-SCNC: 3.8 MMOL/L (ref 3.4–5.3)
PR INTERVAL - MUSE: 150 MS
PROT SERPL-MCNC: 5.8 G/DL (ref 6.8–8.8)
QRS DURATION - MUSE: 66 MS
QT - MUSE: 432 MS
QTC - MUSE: 413 MS
R AXIS - MUSE: 10 DEGREES
RBC # BLD AUTO: 4.54 10E6/UL (ref 3.8–5.2)
SODIUM SERPL-SCNC: 139 MMOL/L (ref 133–144)
SYSTOLIC BLOOD PRESSURE - MUSE: NORMAL MMHG
T AXIS - MUSE: 45 DEGREES
TROPONIN I SERPL HS-MCNC: 7 NG/L
VENTRICULAR RATE- MUSE: 55 BPM
WBC # BLD AUTO: 3.4 10E3/UL (ref 4–11)

## 2022-04-26 PROCEDURE — 250N000013 HC RX MED GY IP 250 OP 250 PS 637: Performed by: INTERNAL MEDICINE

## 2022-04-26 PROCEDURE — 83615 LACTATE (LD) (LDH) ENZYME: CPT | Performed by: INTERNAL MEDICINE

## 2022-04-26 PROCEDURE — 94640 AIRWAY INHALATION TREATMENT: CPT

## 2022-04-26 PROCEDURE — 85379 FIBRIN DEGRADATION QUANT: CPT | Performed by: INTERNAL MEDICINE

## 2022-04-26 PROCEDURE — 36415 COLL VENOUS BLD VENIPUNCTURE: CPT | Performed by: NURSE PRACTITIONER

## 2022-04-26 PROCEDURE — G0378 HOSPITAL OBSERVATION PER HR: HCPCS

## 2022-04-26 PROCEDURE — 96360 HYDRATION IV INFUSION INIT: CPT

## 2022-04-26 PROCEDURE — 80053 COMPREHEN METABOLIC PANEL: CPT | Performed by: NURSE PRACTITIONER

## 2022-04-26 PROCEDURE — 96361 HYDRATE IV INFUSION ADD-ON: CPT

## 2022-04-26 PROCEDURE — 86140 C-REACTIVE PROTEIN: CPT | Performed by: INTERNAL MEDICINE

## 2022-04-26 PROCEDURE — 99285 EMERGENCY DEPT VISIT HI MDM: CPT | Mod: 25

## 2022-04-26 PROCEDURE — 93005 ELECTROCARDIOGRAM TRACING: CPT

## 2022-04-26 PROCEDURE — 85025 COMPLETE CBC W/AUTO DIFF WBC: CPT | Performed by: NURSE PRACTITIONER

## 2022-04-26 PROCEDURE — 258N000003 HC RX IP 258 OP 636: Performed by: NURSE PRACTITIONER

## 2022-04-26 PROCEDURE — 82040 ASSAY OF SERUM ALBUMIN: CPT | Performed by: NURSE PRACTITIONER

## 2022-04-26 PROCEDURE — 84484 ASSAY OF TROPONIN QUANT: CPT | Performed by: NURSE PRACTITIONER

## 2022-04-26 PROCEDURE — 99219 PR INITIAL OBSERVATION CARE,LEVEL II: CPT | Performed by: INTERNAL MEDICINE

## 2022-04-26 PROCEDURE — 36415 COLL VENOUS BLD VENIPUNCTURE: CPT | Performed by: INTERNAL MEDICINE

## 2022-04-26 PROCEDURE — 71045 X-RAY EXAM CHEST 1 VIEW: CPT

## 2022-04-26 RX ORDER — ACETAMINOPHEN 325 MG/1
650 TABLET ORAL EVERY 6 HOURS PRN
Status: DISCONTINUED | OUTPATIENT
Start: 2022-04-26 | End: 2022-04-27 | Stop reason: HOSPADM

## 2022-04-26 RX ORDER — PROPRANOLOL HYDROCHLORIDE 20 MG/1
20 TABLET ORAL 2 TIMES DAILY
Status: DISCONTINUED | OUTPATIENT
Start: 2022-04-26 | End: 2022-04-27 | Stop reason: HOSPADM

## 2022-04-26 RX ORDER — SIMVASTATIN 20 MG
20 TABLET ORAL AT BEDTIME
Status: DISCONTINUED | OUTPATIENT
Start: 2022-04-26 | End: 2022-04-27 | Stop reason: HOSPADM

## 2022-04-26 RX ORDER — SODIUM CHLORIDE, SODIUM LACTATE, POTASSIUM CHLORIDE, CALCIUM CHLORIDE 600; 310; 30; 20 MG/100ML; MG/100ML; MG/100ML; MG/100ML
INJECTION, SOLUTION INTRAVENOUS CONTINUOUS
Status: DISCONTINUED | OUTPATIENT
Start: 2022-04-26 | End: 2022-04-27 | Stop reason: HOSPADM

## 2022-04-26 RX ORDER — SULFAMETHOXAZOLE AND TRIMETHOPRIM 400; 80 MG/1; MG/1
1 TABLET ORAL DAILY
Status: DISCONTINUED | OUTPATIENT
Start: 2022-04-27 | End: 2022-04-27 | Stop reason: HOSPADM

## 2022-04-26 RX ORDER — METFORMIN HCL 500 MG
1500 TABLET, EXTENDED RELEASE 24 HR ORAL
Status: DISCONTINUED | OUTPATIENT
Start: 2022-04-26 | End: 2022-04-27 | Stop reason: HOSPADM

## 2022-04-26 RX ORDER — PRAZOSIN HYDROCHLORIDE 2 MG/1
2 CAPSULE ORAL AT BEDTIME
Status: DISCONTINUED | OUTPATIENT
Start: 2022-04-26 | End: 2022-04-26

## 2022-04-26 RX ORDER — PROPRANOLOL HYDROCHLORIDE 40 MG/1
40 TABLET ORAL EVERY MORNING
Status: DISCONTINUED | OUTPATIENT
Start: 2022-04-27 | End: 2022-04-27 | Stop reason: HOSPADM

## 2022-04-26 RX ORDER — TOPIRAMATE 200 MG/1
200 TABLET, FILM COATED ORAL 2 TIMES DAILY
Status: DISCONTINUED | OUTPATIENT
Start: 2022-04-26 | End: 2022-04-27 | Stop reason: HOSPADM

## 2022-04-26 RX ORDER — ONDANSETRON 4 MG/1
4 TABLET, ORALLY DISINTEGRATING ORAL EVERY 6 HOURS PRN
Status: DISCONTINUED | OUTPATIENT
Start: 2022-04-26 | End: 2022-04-27 | Stop reason: HOSPADM

## 2022-04-26 RX ORDER — PROPRANOLOL HYDROCHLORIDE 40 MG/1
40 TABLET ORAL EVERY MORNING
COMMUNITY
End: 2022-06-14

## 2022-04-26 RX ORDER — ONDANSETRON 2 MG/ML
4 INJECTION INTRAMUSCULAR; INTRAVENOUS EVERY 6 HOURS PRN
Status: DISCONTINUED | OUTPATIENT
Start: 2022-04-26 | End: 2022-04-27 | Stop reason: HOSPADM

## 2022-04-26 RX ORDER — ENOXAPARIN SODIUM 100 MG/ML
40 INJECTION SUBCUTANEOUS EVERY 24 HOURS
Status: DISCONTINUED | OUTPATIENT
Start: 2022-04-26 | End: 2022-04-26

## 2022-04-26 RX ORDER — L. ACIDOPHILUS/PECTIN, CITRUS 25MM-100MG
1 TABLET ORAL
COMMUNITY
End: 2022-06-14

## 2022-04-26 RX ORDER — ACETAMINOPHEN 650 MG/1
650 SUPPOSITORY RECTAL EVERY 6 HOURS PRN
Status: DISCONTINUED | OUTPATIENT
Start: 2022-04-26 | End: 2022-04-27 | Stop reason: HOSPADM

## 2022-04-26 RX ORDER — GABAPENTIN 300 MG/1
600 CAPSULE ORAL AT BEDTIME
Status: DISCONTINUED | OUTPATIENT
Start: 2022-04-26 | End: 2022-04-27 | Stop reason: HOSPADM

## 2022-04-26 RX ORDER — L. ACIDOPHILUS/PECTIN, CITRUS 25MM-100MG
1 TABLET ORAL
Status: DISCONTINUED | OUTPATIENT
Start: 2022-04-26 | End: 2022-04-26

## 2022-04-26 RX ORDER — PROPRANOLOL HYDROCHLORIDE 20 MG/1
20 TABLET ORAL 2 TIMES DAILY
COMMUNITY
End: 2022-06-14

## 2022-04-26 RX ORDER — LATANOPROST 50 UG/ML
1 SOLUTION/ DROPS OPHTHALMIC AT BEDTIME
Status: DISCONTINUED | OUTPATIENT
Start: 2022-04-26 | End: 2022-04-27 | Stop reason: HOSPADM

## 2022-04-26 RX ORDER — PRAZOSIN HYDROCHLORIDE 5 MG/1
15 CAPSULE ORAL AT BEDTIME
Status: DISCONTINUED | OUTPATIENT
Start: 2022-04-26 | End: 2022-04-26

## 2022-04-26 RX ORDER — FAMOTIDINE 20 MG/1
20 TABLET, FILM COATED ORAL 2 TIMES DAILY
Status: DISCONTINUED | OUTPATIENT
Start: 2022-04-26 | End: 2022-04-27 | Stop reason: HOSPADM

## 2022-04-26 RX ORDER — LIDOCAINE 40 MG/G
CREAM TOPICAL
Status: DISCONTINUED | OUTPATIENT
Start: 2022-04-26 | End: 2022-04-27 | Stop reason: HOSPADM

## 2022-04-26 RX ORDER — ALBUTEROL SULFATE 90 UG/1
2 AEROSOL, METERED RESPIRATORY (INHALATION) 4 TIMES DAILY
Status: DISCONTINUED | OUTPATIENT
Start: 2022-04-26 | End: 2022-04-27 | Stop reason: HOSPADM

## 2022-04-26 RX ORDER — PANTOPRAZOLE SODIUM 40 MG/1
40 TABLET, DELAYED RELEASE ORAL DAILY
Status: DISCONTINUED | OUTPATIENT
Start: 2022-04-27 | End: 2022-04-27 | Stop reason: HOSPADM

## 2022-04-26 RX ORDER — MYCOPHENOLIC ACID 180 MG/1
180 TABLET, DELAYED RELEASE ORAL 2 TIMES DAILY
Status: DISCONTINUED | OUTPATIENT
Start: 2022-04-26 | End: 2022-04-27

## 2022-04-26 RX ORDER — ALBUTEROL SULFATE 90 UG/1
2 AEROSOL, METERED RESPIRATORY (INHALATION) EVERY 4 HOURS PRN
Status: DISCONTINUED | OUTPATIENT
Start: 2022-04-26 | End: 2022-04-27 | Stop reason: HOSPADM

## 2022-04-26 RX ADMIN — SODIUM CHLORIDE 1000 ML: 9 INJECTION, SOLUTION INTRAVENOUS at 13:12

## 2022-04-26 RX ADMIN — FAMOTIDINE 20 MG: 20 TABLET ORAL at 22:20

## 2022-04-26 RX ADMIN — ALBUTEROL SULFATE 2 PUFF: 90 AEROSOL, METERED RESPIRATORY (INHALATION) at 22:21

## 2022-04-26 RX ADMIN — APIXABAN 5 MG: 5 TABLET, FILM COATED ORAL at 22:21

## 2022-04-26 RX ADMIN — GABAPENTIN 600 MG: 300 CAPSULE ORAL at 22:20

## 2022-04-26 ASSESSMENT — ENCOUNTER SYMPTOMS
FATIGUE: 1
FEVER: 1
MYALGIAS: 1
WEAKNESS: 1
STRIDOR: 0
CONFUSION: 0
NUMBNESS: 0
DYSURIA: 0
SHORTNESS OF BREATH: 0
SORE THROAT: 0
CHEST TIGHTNESS: 0
NAUSEA: 1
CHILLS: 1
WHEEZING: 1
ARTHRALGIAS: 0
DIAPHORESIS: 1
TROUBLE SWALLOWING: 0
NECK PAIN: 0
HEADACHES: 1
COUGH: 1
PHOTOPHOBIA: 0
ABDOMINAL PAIN: 0
RHINORRHEA: 1
BACK PAIN: 0

## 2022-04-26 NOTE — ED PROVIDER NOTES
"  History     Chief Complaint:  Covid Concern       HPI   Elizabeth Palma is a 66 year old female, with history of PE (on apixaban), kidney transplant (see Myfortic, cyclosporine), hyperparathyroidism, type 2 diabetes, who presents with shortness of breath and weakness.  She tested positive for Covid 4/23/22 with COVID test taken in preparation for colonoscopy.  She presents today with fatigue, cough, HA, diarrhea.  The patient notes she has been so fatigued she has not been able to get out of bed for the last 2 days or heat.  She notes she has lost 10 pounds since Sunday.  The patient has been taking her apixaban and Qvar.  She denies chest pain but symptoms are not consistent with prior pulmonary embolism.  She was scheduled to get monoclonal antibodies through the [facility] today but felt too weak to get the antibodies.  She has had 3 COVID vaccinations and is recommended to get the second booster through her transplant office.  Patient notes she does not feel safe going home due to her weakness as her  is unable to care for her.    Patient did speak with her transplant office who recommended (see below)  \"fahad Johnson, that she tested positive for COVID. She was doing test due to upcoming colonoscopy. She has had diarrhea for 3 days. Really bad heartburn started last night and headaches. Had a little shortness of breath this morning, but states that is not unusual for her with her asthma. No fevers.  She is taking  mg BId and myfortic 540 mg BID and protonix 40 mg daily.      Per Dr Urbina patient to decrease myfortic to 180 mg BID. Fill out monoclonal antibody form. If patient has worsening shortness of breath to go to the ED. Pt to follow up with PCP office on Monday. RN[facility] to check in on patient on Monday per DR Urbina.   Patient aware that the monoclonal antibody form has been filled out and if available they will contact her. Patient repeated to decrease myfortic 180 mg BID " "and follow up with PCP office.. Did update , Rahat, with dose change. Elizabeth will call back with any changes or concerns\"    Review of Systems   Constitutional: Positive for chills, diaphoresis, fatigue and fever.   HENT: Positive for congestion, rhinorrhea and sneezing. Negative for sore throat, tinnitus and trouble swallowing.    Eyes: Negative for photophobia and visual disturbance.   Respiratory: Positive for cough and wheezing. Negative for chest tightness, shortness of breath and stridor.    Cardiovascular: Negative for chest pain.   Gastrointestinal: Positive for nausea. Negative for abdominal pain.   Genitourinary: Negative for dysuria.   Musculoskeletal: Positive for myalgias. Negative for arthralgias, back pain and neck pain.   Skin: Negative for rash.   Neurological: Positive for weakness and headaches. Negative for numbness.   Psychiatric/Behavioral: Negative for confusion.   All other systems reviewed and are negative.    Allergies:  Percocet [Oxycodone-Acetaminophen]  Novocain [Procaine Hcl]     Medications:    acetaminophen (TYLENOL) 500 MG tablet  acetylcysteine (N-ACETYL CYSTEINE) 600 MG CAPS capsule  albuterol (PROAIR HFA/PROVENTIL HFA/VENTOLIN HFA) 108 (90 Base) MCG/ACT inhaler  amylase-lipase-protease (CREON 24) 68548-90409 units CPEP per EC capsule  apixaban ANTICOAGULANT (ELIQUIS) 5 MG tablet  ARIPiprazole (ABILIFY) 2 MG tablet  beclomethasone HFA (QVAR REDIHALER) 40 MCG/ACT inhaler  Cholecalciferol (VITAMIN D) 1000 UNITS capsule  cyanocolbalamin (VITAMIN  B-12) 1000 MCG tablet  cycloSPORINE modified (GENERIC EQUIVALENT) 25 MG capsule  dextromethorphan (DELSYM) 30 MG/5ML liquid  ferrous sulfate (FEROSUL) 325 (65 Fe) MG tablet  fluticasone (FLONASE) 50 MCG/ACT nasal spray  furosemide (LASIX) 40 MG tablet  gabapentin (NEURONTIN) 300 MG capsule  latanoprost (XALATAN) 0.005 % ophthalmic solution  levalbuterol (XOPENEX HFA) 45 MCG/ACT inhaler  LORazepam (ATIVAN) 0.5 MG tablet  metFORMIN " (GLUCOPHAGE-XR) 500 MG 24 hr tablet  mycophenolic acid (GENERIC EQUIVALENT) 180 MG EC tablet  nystatin (MYCOSTATIN) 551577 UNIT/GM external cream  ondansetron (ZOFRAN-ODT) 4 MG ODT tab  order for DME  pantoprazole (PROTONIX) 40 MG EC tablet  Polyvinyl Alcohol-Povidone (REFRESH OP)  prazosin (MINIPRESS) 2 MG capsule  prazosin (MINIPRESS) 2 MG capsule  prazosin (MINIPRESS) 2 MG capsule  prazosin (MINIPRESS) 5 MG capsule  propranolol (INDERAL) 20 MG tablet  pseudoePHEDrine-guaiFENesin (MUCINEX D)  MG 12 hr tablet  pseudoePHEDrine-guaiFENesin (MUCINEX D)  MG 12 hr tablet  simvastatin (ZOCOR) 20 MG tablet  sulfamethoxazole-trimethoprim (BACTRIM) 400-80 MG tablet  topiramate (TOPAMAX) 200 MG tablet  Vaginal Lubricant (REPLENS) GEL  vilazodone (VIIBRYD) 10 MG TABS tablet  vilazodone (VIIBRYD) 40 MG TABS tablet        Past Medical History:    Past Medical History:   Diagnosis Date     Abnormal MRI, cervical spine 10/15/2011     Autosomal dominant polycystic kidney disease 2011     CMC DJD(carpometacarpal degenerative joint disease), localized primary 2013     -donor kidney transplant 2014     Gastroesophageal reflux disease      Generalized anxiety disorder 11/15/2012     Glaucoma      Hyperlipidemia 10/15/2011     Hyperparathyroidism, secondary (H) 2015     Hypertension      Immunosuppressed status (H) 2014     Major depressive disorder, recurrent episode, moderate (H) 11/15/2012     Obesity (BMI 30-39.9)      OP (osteoporosis) T score -3.8 2009     LION (obstructive sleep apnoea) 10/15/2012     Pain in joint, forearm -- L unhealed Fx 2013     Premature menopause age 37 07/10/2012     Restless leg syndrome      Stiffness of joint, not elsewhere classified, hand 2013     Tremor 10/15/2011     Type 2 DM with Neuropathy 1985     Uncomplicated asthma      Patient Active Problem List   Diagnosis     Hypertension     Hyperlipidemia     Abnormal MRI, cervical  spine     Sensory loss     Premature menopause age 37     OP (osteoporosis) T score -3.8     Major depressive disorder, recurrent episode, moderate (H)     Generalized anxiety disorder     CMC DJD(carpometacarpal degenerative joint disease), localized primary     Pain in joint, forearm -- L unhealed Fx     -donor kidney transplant     Immunosuppressed status (H)     Hyperparathyroidism, secondary (H)     Hyperparathyroidism (H)     Senile osteoporosis     Pain in joint involving ankle and foot     Nightmares associated with chronic post-traumatic stress disorder     DM type 2 with Neuropathy, Hgb A1C 7.3 on 21     Posttraumatic stress disorder     Plantar fasciitis, bilateral     Right knee pain     Age-related osteoporosis without current pathological fracture     Narcissistic personality disorder (H)     Personal history of other drug therapy     Median sensory neuropathy, left     Marital conflict     Suicidal ideation     Autosomal dominant polycystic kidney disease     Secondary hyperparathyroidism (H)     Anemia, iron deficiency     Morbid obesity (H)     Chronic kidney disease, stage 3 (H)     PCKD, S/P Renal Transplant  ([facility])     Intractable back pain     UTI     Bacteremia     Chronic kidney disease, stage V (H)        Past Surgical History:    Past Surgical History:   Procedure Laterality Date     ABDOMEN SURGERY       ANKLE SURGERY       C/SECTION, LOW TRANSVERSE      x 2     CHOLECYSTECTOMY       COLONOSCOPY       ESOPHAGOSCOPY, GASTROSCOPY, DUODENOSCOPY (EGD), COMBINED N/A 2015    Procedure: COMBINED ESOPHAGOSCOPY, GASTROSCOPY, DUODENOSCOPY (EGD);  Surgeon: Sky Davey MD;  Location:  GI     ESOPHAGOSCOPY, GASTROSCOPY, DUODENOSCOPY (EGD), COMBINED N/A 2015    Procedure: COMBINED ESOPHAGOSCOPY, GASTROSCOPY, DUODENOSCOPY (EGD), BIOPSY SINGLE OR MULTIPLE;  Surgeon: Sky Davey MD;  Location:  GI     EYE SURGERY       LAPAROSCOPY, SURGICAL; REPAIR  "INCISIONAL OR VENTRAL HERNIA       LASER SURGERY OF EYE Left 10/01/2020    sever vitreous strands     ORTHOPEDIC SURGERY       HERMINIA EN Y BOWEL  1990     WRIST SURGERY       [facility] TRANSPLANTATION OF KIDNEY  03/2014        Family History:    family history includes Cancer in an other family member; Diabetes in her father, mother, and other family members; Genetic Disorder in her father; Heart Disease in an other family member; Hyperlipidemia in her mother; Hypertension in her father and mother; Mental Illness in her father and other family members.    Social History:   reports that she has never smoked. She has never used smokeless tobacco. She reports that she does not drink alcohol and does not use drugs.  PCP: Horacio Sheridan     Physical Exam     Patient Vitals for the past 24 hrs:   BP Temp Temp src Pulse Resp SpO2 Height Weight   04/26/22 1700 124/72 -- -- 52 -- 96 % -- --   04/26/22 1631 123/79 -- -- 53 -- -- -- --   04/26/22 1630 -- -- -- -- -- 96 % -- --   04/26/22 1615 -- -- -- -- -- 95 % -- --   04/26/22 1600 -- -- -- -- -- 94 % -- --   04/26/22 1500 -- -- -- -- -- 97 % -- --   04/26/22 1445 -- -- -- -- -- 96 % -- --   04/26/22 1430 -- -- -- -- -- 95 % -- --   04/26/22 1415 -- -- -- -- -- 96 % -- --   04/26/22 1400 -- -- -- -- -- 96 % -- --   04/26/22 1345 -- -- -- -- -- 94 % -- --   04/26/22 1330 -- -- -- -- -- 95 % -- --   04/26/22 1315 -- -- -- -- -- 95 % -- --   04/26/22 1115 (!) 155/59 98.5  F (36.9  C) Oral 54 20 94 % 1.549 m (5' 1\") 83.9 kg (185 lb)        Physical Exam  Nursing notes reviewed. Vitals reviewed.  General: Alert.  Ill appearing.  Eyes:  Conjunctiva non-injected, non-icteric.  Neck/Throat: Moist mucous membranes.  Normal voice.  Cardiac: Regular rhythm. Normal heart sounds with no murmur/rubs/click.   Pulmonary: Clear and equal breath sounds bilaterally. No crackles/rales. No wheezing.  Speaking in full sentences.  Faint bilateral expiratory wheezing.  Pulse oxygenation 92% on room air at " rest.  Abdomen: Soft. Non-distended. Non-tender to palpation. No masses. No guarding or rebound.  Musculoskeletal: Normal gross range of motion of all 4 extremities.    Neurological: Alert and oriented x4.   Skin: Warm and dry without rashes or petechiae. Normal appearance of visualized exposed skin.  Psych: Affect normal. Good eye contact.    Emergency Department Course   ECG  ECG taken at 1223, ECG read at 1228  Sinus bradycardia. Low voltage QRS. Nonspecific ST and T wave abnormality.    Nonspecific ST/T wave changes are new as compared to prior, dated 2/3/20.  Rate 55 bpm. MS interval 150 ms. QRS duration 66 ms. QT/QTc 432/413 ms. P-R-T axes 72 10 45.     Imaging:  XR Chest Port 1 View   Final Result   IMPRESSION: Mild elevation of the left hemidiaphragm. Old right rib   fractures. Chest otherwise negative. Lungs clear.       ERNST ORTIZ MD            SYSTEM ID:  [id number]         Report per radiology    Laboratory:  Labs Ordered and Resulted from Time of ED Arrival to Time of ED Departure   COMPREHENSIVE METABOLIC PANEL - Abnormal       Result Value    Sodium 139      Potassium 3.8      Chloride 112 (*)     Carbon Dioxide (CO2) 22      Anion Gap 5      Urea Nitrogen 16      Creatinine 1.21 (*)     Calcium 8.5      Glucose 175 (*)     Alkaline Phosphatase 52      AST 30      ALT 50      Protein Total 5.8 (*)     Albumin 3.0 (*)     Bilirubin Total 0.4      GFR Estimate 50 (*)    CBC WITH PLATELETS AND DIFFERENTIAL - Abnormal    WBC Count 3.4 (*)     RBC Count 4.54      Hemoglobin 13.5      Hematocrit 42.1      MCV 93      MCH 29.7      MCHC 32.1      RDW 14.1      Platelet Count 135 (*)     % Neutrophils 72      % Lymphocytes 15      % Monocytes 10      % Eosinophils 1      % Basophils 1      % Immature Granulocytes 1      NRBCs per 100 WBC 0      Absolute Neutrophils 2.5      Absolute Lymphocytes 0.5 (*)     Absolute Monocytes 0.4      Absolute Eosinophils 0.0      Absolute Basophils 0.0      Absolute  Immature Granulocytes 0.0      Absolute NRBCs 0.0     TROPONIN I - Normal    Troponin I High Sensitivity 7          Emergency Department Course:  Reviewed:  I reviewed nursing notes, vitals, past medical history and Care Everywhere    Assessments:  1124 I obtained history and examined the patient as noted above.   1436 I rechecked the patient and explained findings.     Consults:   1435 I spoke with Dr. Any Dunn, hospitalist, accepts for admission    Interventions:  Medications   0.9% sodium chloride BOLUS (0 mLs Intravenous Stopped 4/26/22 1412)       Disposition:  The patient was admitted to the hospital under the care of Dr. Dunn.     Impression & Plan      Covid-19  Elizabeth Palma was evaluated during a global COVID-19 pandemic, which necessitated consideration that the patient might be at risk for infection with the SARS-CoV-2 virus that causes COVID-19.   Applicable protocols for evaluation were followed during the patient's care.   COVID-19 was considered as part of the patient's evaluation. The plan for testing is:  a test was obtained at a previous visit and reviewed & considered today.      Medical Decision Making:  Elizabeth Palma is a 66 year old female, with history of PE (apixaban), kidney transplant (Myfortic, cyclosporine), hyperparathyroidism, type 2 diabetes, who presents with shortness of breath and weakness.   The patient is COVID-positive.  Lab work shows a neutropenia and leukopenia consistent with COVID.  She has a mild elevation in her creatinine which was treated with IV fluids.  She has no hypoxia on room air.  The patient is anticoagulated and has been taking her anticoagulation.  She is not tachycardic and with no chest pain, PE is considered unlikely.  Chest x-ray shows prior rib fractures but no other acute abnormality.  EKG shows new nonspecific ST/T wave changes, but troponin returns negative and acute coronary syndrome is unlikely.  The patient does not feel safe going home.  I  spoke with Dr. Dunn, hospitalist accepts for admission.    Diagnosis:    ICD-10-CM    1. Infection due to 2019 novel coronavirus  U07.1    2. Generalized muscle weakness  M62.81    3. Altered mental status  R41.82    4. History of kidney transplant  Z94.0         4/26/2022   Bambi Spann, Bambi Malloy, CNP  04/26/22 1821

## 2022-04-26 NOTE — TELEPHONE ENCOUNTER
"ISSUE: Covid + 4/23/2022, asymptomatic pre procedure test for upcoming colonoscopy.   Myfortic decreased from 540 mg bid to 180 mg bid.  Patient is also on  mg bid  Patient is scheduled to receive monoclonal antibodies today. Covid vaccine X3,  Patient aware we are recommending 2nd booster.    OUTCOME:   Current symptoms ( started 4/24)  diarrhea ( increased from baseline ), fatigue, cough, congestion.  Patient reports SOB and chest pain, \" bad heart burn\".      Patient does not have pulse oximeter at home,  BP while on the phone with RNCC 106/76, which is on the low side for patient.  HR: 58, baseline for patient. SOB increased while getting BP cuff on.      Patient instructed to present to ED.  Patient and  v/u.    Update sent to Dr Jordan.    Marivel Marcelo RN   Transplant Coordinator  [phone]        "

## 2022-04-26 NOTE — PHARMACY-ADMISSION MEDICATION HISTORY
Pharmacy Medication History  Admission medication history interview status for the 4/26/2022  admission is complete. See EPIC admission navigator for prior to admission medications     Location of Interview: Phone  Medication history sources: Patient's family/friend (Shawn (spouse))    Significant changes made to the medication list:  Added Xopenex, removed Albuterol. Qvar daily only. Vitamin B12 500mcg,     In the past week, patient estimated taking medication this percent of the time: greater than 90%    Additional medication history information:       Medication reconciliation completed by provider prior to medication history? No    Time spent in this activity: 20min    Prior to Admission medications    Medication Sig Last Dose Taking? Auth Provider   acetylcysteine (N-ACETYL CYSTEINE) 600 MG CAPS capsule Take 1 capsule (600 mg) by mouth 2 times daily (Patient does not know what strength they are taking.) 4/26/2022 at am Yes Chaparrita Hatch MD   amylase-lipase-protease (CREON 24) 37135-04153 units CPEP per EC capsule Take 3 capsules by mouth 3 times daily (with meals) Past Week at Unknown time Yes Jose Joya MD   apixaban ANTICOAGULANT (ELIQUIS) 5 MG tablet Take 5 mg by mouth 2 times daily 4/26/2022 at am Yes Reported, Patient   ARIPiprazole (ABILIFY) 2 MG tablet Take 1.5 tablets (3 mg) by mouth 2 times daily  Patient taking differently: Take 3 mg by mouth every morning 4/26/2022 at am Yes Chaaprrita Hatch MD   beclomethasone HFA (QVAR REDIHALER) 40 MCG/ACT inhaler Inhale 1 puff into the lungs 2 times daily  Patient taking differently: Inhale 1 puff into the lungs daily 4/26/2022 at am Yes Juan Mathias MD   Cholecalciferol (VITAMIN D) 1000 UNITS capsule Take 1,000 Units by mouth daily 4/26/2022 at am Yes Fina Garcia MD   cyanocolbalamin (VITAMIN  B-12) 1000 MCG tablet Take 1 tablet by mouth daily.  Patient taking differently: Take 500 mcg by mouth daily 4/26/2022 at  am Yes Juan Warren APRN CNP   cycloSPORINE modified (GENERIC EQUIVALENT) 25 MG capsule Take 5 capsules (125 mg) by mouth 2 times daily TAKE 5 CAPSULES (125MG) BY MOUTH TWO TIMES A DAY 4/26/2022 at Unknown time Yes Francisco Jordan MD   ferrous sulfate (FEROSUL) 325 (65 Fe) MG tablet Take 1 tablet (325 mg) by mouth daily (with breakfast) 4/26/2022 at Unknown time Yes Yogesh Santacruz MD   fluticasone (FLONASE) 50 MCG/ACT nasal spray Spray 1 spray into both nostrils daily  Patient taking differently: Spray 1 spray into both nostrils daily as needed prn Yes Anastasiya Chaney MD   furosemide (LASIX) 40 MG tablet Take 1 tablet (40 mg) by mouth daily 4/26/2022 at am Yes Horacio Sheridan MD   gabapentin (NEURONTIN) 300 MG capsule Take 2 capsules (600 mg) by mouth At Bedtime 4/25/2022 at hs Yes Horacio Sheridan MD   Lactobacillus Acid-Pectin (LACTOBACILLUS ACIDOPHILUS) TABS Take 1 tablet by mouth daily (with dinner) 4/25/2022 at evening Yes Unknown, Entered By History   latanoprost (XALATAN) 0.005 % ophthalmic solution Place 1 drop into both eyes At Bedtime 4/25/2022 at hs Yes Yonas Underwood MD   levalbuterol (XOPENEX HFA) 45 MCG/ACT inhaler Inhale 2 puffs into the lungs every 6 hours as needed for shortness of breath / dyspnea or wheezing prn Yes Juan Mathias MD   LORazepam (ATIVAN) 0.5 MG tablet Take 1 tab (0.5mg) twice weekly as need for anxiety prn Yes Chaparrita Hatch MD   metFORMIN (GLUCOPHAGE-XR) 500 MG 24 hr tablet Take 3 tablets (1,500 mg) by mouth daily (with dinner) 4/25/2022 at pm Yes Horacio Sheridan MD   mycophenolic acid (GENERIC EQUIVALENT) 180 MG EC tablet Take 1 tablet (180 mg) by mouth 2 times daily Due to COVID positive. 4/26/2022 at Unknown time Yes Hebert Urbina MD   nystatin (MYCOSTATIN) 992163 UNIT/GM external cream Apply topically 2 times daily To toenails.  Patient taking differently: Apply topically daily as needed To toenails. prn Yes Parag,  RYNE Herrera   ondansetron (ZOFRAN-ODT) 4 MG ODT tab Take 1 tablet (4 mg) by mouth every 6 hours as needed for nausea prn Yes Horacio Sheridan MD   pantoprazole (PROTONIX) 40 MG EC tablet Take 1 tablet (40 mg) by mouth daily 4/26/2022 at Unknown time Yes Horacio Sheridan MD   Polyvinyl Alcohol-Povidone (REFRESH OP) Apply to eye as needed Both eyes prn Yes Reported, Patient   prazosin (MINIPRESS) 2 MG capsule Take 1 capsule (2 mg) by mouth At Bedtime 4/25/2022 at hs Yes Chaparrita Hatch MD   prazosin (MINIPRESS) 5 MG capsule Take 3 x 5mg (15mg) caps + 1 x 2mg caps at bedtime (total dose=17mg) 4/25/2022 at hs Yes Chaparrita Hatch MD   propranolol (INDERAL) 20 MG tablet Take 20 mg by mouth 2 times daily Afternoon & evening. 4/25/2022 at evening Yes Unknown, Entered By History   propranolol (INDERAL) 40 MG tablet Take 40 mg by mouth every morning 4/26/2022 at am Yes Unknown, Entered By History   psyllium (METAMUCIL/KONSYL) capsule Take 5 capsules by mouth every evening With supper. 4/25/2022 at pm Yes Unknown, Entered By History   simvastatin (ZOCOR) 20 MG tablet Take 1 tablet (20 mg) by mouth At Bedtime 4/25/2022 at Unknown time Yes Horacio Sheridan MD   sulfamethoxazole-trimethoprim (BACTRIM) 400-80 MG tablet Take 1 tablet by mouth daily 4/26/2022 at Unknown time Yes Christian Jimenez MD   topiramate (TOPAMAX) 200 MG tablet Take 1 tablet (200 mg) by mouth 2 times daily 4/26/2022 at Unknown time Yes Prakash Irene MD   Vaginal Lubricant (REPLENS) GEL Use vaginally as needed. Can use up to 3 times per week. prn Yes Param Salas APRN CNBHAVNA   vilazodone (VIIBRYD) 10 MG TABS tablet Take 10mg tab with 40mg tab for total daily dose of 50 mg 4/26/2022 at am Yes Chaparrita Hatch MD   vilazodone (VIIBRYD) 40 MG TABS tablet Take 40mg tab with 10mg tab for total daily dose of 50 mg 4/26/2022 at am Yes Chaparrita Hatch MD   acetaminophen (TYLENOL) 500 MG tablet Take 1,500 mg by mouth every 6  hours as needed for mild pain prn  Unknown, Entered By History   order for DME Walker with front wheels and a seat.   Susan Tate MD       The information provided in this note is only as accurate as the sources available at the time of update(s)

## 2022-04-26 NOTE — ED NOTES
Call light answered. Pt assisted to BSC with SBA. Pt reports feels weak, but able to transfer self in and out of bed. Pt updated on wait for bed placement.

## 2022-04-26 NOTE — ED NOTES
[facility]  ED Nurse Handoff Report    ED Chief complaint: Covid Concern      ED Diagnosis:   Final diagnoses:   Infection due to 2019 novel coronavirus   Generalized muscle weakness   Altered mental status   History of kidney transplant       Code Status: to be addressed by admitting    Allergies:   Allergies   Allergen Reactions     Percocet [Oxycodone-Acetaminophen] Nausea and Vomiting     Novocain [Procaine Hcl] Hives     Had reaction 25 years ago to old renetta. Pt reports multiple injections of lidocaine since then without reaction.  Tolerated lidocaine injection today without difficulty.  Osmar Mark MD IR Service.       Patient Story: pt tested for covid prior to scheduled colonoscopy and was told it was positive. Pt has been having symptoms since then. Pt reports lost 10 pounds in 3 days and has been in bed for last three days  Focused Assessment:  Pt alert and oriented. Afebrile. Hx of transplant of kidney years ago. Pt reports is vegetarian  Abnormal Labs Resulted from Time of ED Arrival to Time of ED Departure   COMPREHENSIVE METABOLIC PANEL - Abnormal       Result Value    Sodium 139      Potassium 3.8      Chloride 112 (*)     Carbon Dioxide (CO2) 22      Anion Gap 5      Urea Nitrogen 16      Creatinine 1.21 (*)     Calcium 8.5      Glucose 175 (*)     Alkaline Phosphatase 52      AST 30      ALT 50      Protein Total 5.8 (*)     Albumin 3.0 (*)     Bilirubin Total 0.4      GFR Estimate 50 (*)    CBC WITH PLATELETS AND DIFFERENTIAL - Abnormal    WBC Count 3.4 (*)     RBC Count 4.54      Hemoglobin 13.5      Hematocrit 42.1      MCV 93      MCH 29.7      MCHC 32.1      RDW 14.1      Platelet Count 135 (*)     % Neutrophils 72      % Lymphocytes 15      % Monocytes 10      % Eosinophils 1      % Basophils 1      % Immature Granulocytes 1      NRBCs per 100 WBC 0      Absolute Neutrophils 2.5      Absolute Lymphocytes 0.5 (*)     Absolute Monocytes 0.4      Absolute Eosinophils 0.0       Absolute Basophils 0.0      Absolute Immature Granulocytes 0.0      Absolute NRBCs 0.0       XR Chest Port 1 View   Preliminary Result   IMPRESSION: Mild elevation of the left hemidiaphragm. Old right rib   fractures. Chest otherwise negative. Lungs clear.         Treatments and/or interventions provided: IVF  Patient's response to treatments and/or interventions:     To be done/followed up on inpatient unit:  admission orders    Does this patient have any cognitive concerns?: none noted    Activity level - Baseline/Home:  Independent  Activity Level - Current:   Stand with Assist    Patient's Preferred language: English   Needed?: No    Isolation: COVID r/o and special precautions  Infection: COVID r/o and special precautions  Patient tested for COVID 19 prior to admission: NO  Bariatric?: No    Vital Signs:   Vitals:    04/26/22 1615 04/26/22 1630 04/26/22 1631 04/26/22 1700   BP:   123/79 124/72   Pulse:   53 52   Resp:       Temp:       TempSrc:       SpO2: 95% 96%  96%   Weight:       Height:           Cardiac Rhythm:     Was the PSS-3 completed:   Yes  What interventions are required if any?               Family Comments:   OBS brochure/video discussed/provided to patient/family: Yes              Name of person given brochure if not patient:               Relationship to patient:     For the majority of the shift this patient's behavior was Green.   Behavioral interventions performed were .    ED NURSE PHONE NUMBER: *[id number]

## 2022-04-26 NOTE — H&P
[facility]    History and Physical - Hospitalist Service       Date of Admission:  2022    Assessment & Plan      Elizabeth Palma is a 66 year old female, s/p kidney transplant, hypertension, diabetes mellitus type 2, PE who presents  on 2022 with generalized weakness, diarrhea and recent diagnosis of COVID-19.    #.  Acute illness due to lab confirmed COVID-19  #.  Generalized weakness secondary to COVID-19  #.  Mild Leukopenia and thrombocytopenia-likely secondary to COVID-19  -Tested positive on .  Symptom onset   -Symptoms-generalized weakness, lethargy/fatigue, sore throat, mild cough, diarrhea, anorexia  -Vaccination status-has received 3 doses of COVID-vaccine  -Chest x-ray-clear, no infiltrates  -Afebrile, not hypoxic    -Continue special precautions  -Discussed with pharmacy- Paxlovid, monoclonal antibody not available for inpatient use.  We will treat with 3 days of IV remdesivir.  Due to her kidney transplant status, on immunosuppression, she has had high risk for severe COVID-19  -Hydrate with LR at 100 mL/h.  Hold Lasix  -PT/OT evaluation  -Albuterol  Inhaler  -Continue steroid inhaler   -No indication for systemic steroids or antibiotics  -Monitor labs  -Hold vitamins and supplements until oral intake improves    #.  S/p  donor kidney transplant, , for end-stage renal disease due to polycystic kidney disease  -On immunosuppression  - Renal function stable and at baseline  -Continue cyclosporine mycophenolate.  Mycophenolate dose was decreased by PCP recently due to COVID-19  -Continue prophylactic Bactrim    #.  Segmental right lower lobe PE without right heart strain, 2022  -On indefinite anticoagulation with Eliquis, continue    #.  Diabetes mellitus type 2 with peripheral neuropathy, controlled with most recent hemoglobin A1c 7.3  -Continue metformin  -Monitor blood sugars twice daily  -Continue gabapentin      #.  Essential  "hypertension  #.  Hyperlipidemia  -Stable   -Continue propanolol and simvastatin    #.  Generalized anxiety disorder  #.  Major depression  #.  PTSD  -Continue Abilify, prazosin, will consider  -Follows up with psychiatry    #.  S/p gastric bypass, 1990  -Continue Protonix    #.  Pancreatic cyst, likely IPMN  -Has been assessed by GI 3/2022.   -We will follow up with GI in 1 year with repeat MRI/MRCP for follow-up    #.  Tremors  -Continue propanolol       Diet:  Regular diet  DVT Prophylaxis: DOAC  Collier Catheter: Not present  Right lower lobe PE lines: None  Cardiac Monitoring: None  Code Status:  Full code    Clinically Significant Risk Factors Present on Admission             # Hypoalbuminemia: Albumin = 3.0 g/dL (Ref range: 3.4 - 5.0 g/dL) on admission, will monitor as appropriate   # Coagulation Defect: home medication list includes an anticoagulant medication    # Diabetes, type II: last A1C 7.3 % (Ref range: 0.0 - 5.6 %)  # Obesity: Estimated body mass index is 34.96 kg/m  as calculated from the following:    Height as of this encounter: 1.549 m (5' 1\").    Weight as of this encounter: 83.9 kg (185 lb).      Disposition Plan   Expected Discharge:    Anticipated discharge location:  Awaiting care coordination huddle  Delays:            The patient's care was discussed with the Patient.    Any Dunn MD  Hospitalist Service  [facility]  Securely message with the Vocera Web Console (learn more here)  Text page via [employer] Paging/Directory         ______________________________________________________________________    Chief Complaint   Generalized weakness, COVID-19, diarrhea    History is obtained from the patient    History of Present Illness   Elizabeth Palma is a 66 year old female,  s/p kidney transplant, hypertension, diabetes mellitus type 2, PE who presents today with generalized weakness, diarrhea and recent diagnosis of COVID-19.      Patient reports that she had been " having diarrhea for about 2 months for which she was scheduled to get a colonoscopy.  As a requirement she underwent COVID-19 test and was found to be positive on 4/23.    She started developing symptoms on 4/24.  Reports developing sore throat, cough, worsening diarrhea, severe generalized weakness, fatigue to the point where she is sleeping most of the day and is unable to do activities of daily life.  Her  has been helping her.  Reports she has not been able to eat and drink well and has lost 10 pounds.  Has nausea but no vomiting.  Denies fever or chills.    She was seen by her primary care provider and was supposed to get monoclonal antibody but due to her worsening weakness, she was asked to come to ER.    Vital signs stable-blood pressure 155/59, heart rate 54, temperature 98.5, respiratory rate 20, saturation 94-97% on room air.    Labs showed stable hemoglobin of 13.5, mild leukopenia and thrombocytopenia with WBC 3.4 and platelets 135.    BMP showed normal electrolytes, stable renal function with creatinine of 1.21    LFTs okay.  Troponin normal.    Chest x-ray Was clear without any acute infiltrate      Review of Systems    The 10 point Review of Systems is negative other than noted in the HPI or here.       Past Medical History    I have reviewed this patient's medical history and updated it with pertinent information if needed.   Past Medical History:   Diagnosis Date     Abnormal MRI, cervical spine 10/15/2011    4/2011; mild changes noted. Study done for left arm symptoms Impression:  1. Mild multilevel degenerative disc disease with no significant canal or neural stenosis seen. motion artifact on the STIR images in these are not interpretable. The remaining images were interpreted      Autosomal dominant polycystic kidney disease 07/05/2011     (Problem list name updated by automated process. Provider to review and confirm.)     [facility] DJD(carpometacarpal degenerative joint disease), localized  primary 2013     -donor kidney transplant 2014     Gastroesophageal reflux disease      Generalized anxiety disorder 11/15/2012     Glaucoma      Hyperlipidemia 10/15/2011     Hyperparathyroidism, secondary (H) 2015     Hypertension     resolved     Immunosuppressed status (H) 2014     Major depressive disorder, recurrent episode, moderate (H) 11/15/2012     Obesity (BMI 30-39.9)      OP (osteoporosis) T score -3.8 2009 T-score -3.7      LION (obstructive sleep apnoea) 10/15/2012    reported intolerant to CPAP -- she says she doesn't have LION     Pain in joint, forearm -- L unhealed Fx 2013     Premature menopause age 37 07/10/2012    OCP (vaginal bldg)-->HT which she stopped 2 mo later documented at 2007 visit (age 51).      Restless leg syndrome      Stiffness of joint, not elsewhere classified, hand 2013     Tremor 10/15/2011    head     Type 2 DM with Neuropathy     started with gestational diabetes     Uncomplicated asthma        Past Surgical History   I have reviewed this patient's surgical history and updated it with pertinent information if needed.  Past Surgical History:   Procedure Laterality Date     ABDOMEN SURGERY       ANKLE SURGERY       C/SECTION, LOW TRANSVERSE      x 2     CHOLECYSTECTOMY       COLONOSCOPY       ESOPHAGOSCOPY, GASTROSCOPY, DUODENOSCOPY (EGD), COMBINED N/A 2015    Procedure: COMBINED ESOPHAGOSCOPY, GASTROSCOPY, DUODENOSCOPY (EGD);  Surgeon: Sky Davey MD;  Location:  GI     ESOPHAGOSCOPY, GASTROSCOPY, DUODENOSCOPY (EGD), COMBINED N/A 2015    Procedure: COMBINED ESOPHAGOSCOPY, GASTROSCOPY, DUODENOSCOPY (EGD), BIOPSY SINGLE OR MULTIPLE;  Surgeon: Sky Davey MD;  Location:  GI     EYE SURGERY       LAPAROSCOPY, SURGICAL; REPAIR INCISIONAL OR VENTRAL HERNIA       LASER SURGERY OF EYE Left 10/01/2020    sever vitreous strands     ORTHOPEDIC SURGERY       HERMINIA EN Y BOWEL        WRIST SURGERY       [facility] TRANSPLANTATION OF KIDNEY  2014       Social History   I have reviewed this patient's social history and updated it with pertinent information if needed.  Social History     Tobacco Use     Smoking status: Never Smoker     Smokeless tobacco: Never Used   Substance Use Topics     Alcohol use: No     Alcohol/week: 0.0 standard drinks     Drug use: No       Family History   I have reviewed this patient's family history and updated it with pertinent information if needed.  Family History   Problem Relation Age of Onset     Hyperlipidemia Mother      Diabetes Mother      Hypertension Mother      Genetic Disorder Father      Mental Illness Father      Diabetes Father      Hypertension Father      Mental Illness Other         family hx     Heart Disease Other      Diabetes Other      Cancer Other      Mental Illness Other      Diabetes Other      Glaucoma No family hx of      Macular Degeneration No family hx of        Prior to Admission Medications   Prior to Admission Medications   Prescriptions Last Dose Informant Patient Reported? Taking?   ARIPiprazole (ABILIFY) 2 MG tablet   No No   Sig: Take 1.5 tablets (3 mg) by mouth 2 times daily   Cholecalciferol (VITAMIN D) 1000 UNITS capsule   Yes No   Si,000 Units   LORazepam (ATIVAN) 0.5 MG tablet   No No   Sig: Take 1 tab (0.5mg) twice weekly as need for anxiety   Polyvinyl Alcohol-Povidone (REFRESH OP)   Yes No   Sig: Apply to eye as needed Both eyes   Vaginal Lubricant (REPLENS) GEL   No No   Sig: Use vaginally as needed. Can use up to 3 times per week.   acetaminophen (TYLENOL) 500 MG tablet   Yes No   Sig: Take 1,500 mg by mouth every 6 hours as needed for mild pain   acetylcysteine (N-ACETYL CYSTEINE) 600 MG CAPS capsule   No No   Sig: Take 1 capsule (600 mg) by mouth 2 times daily (Patient does not know what strength they are taking.)   albuterol (PROAIR HFA/PROVENTIL HFA/VENTOLIN HFA) 108 (90 Base) MCG/ACT inhaler   No No   Sig:  Inhale 2 puffs into the lungs every 6 hours as needed for shortness of breath / dyspnea or wheezing   amylase-lipase-protease (CREON 24) 39334-51295 units CPEP per EC capsule   No No   Sig: Take 3 capsules by mouth 3 times daily (with meals)   apixaban ANTICOAGULANT (ELIQUIS) 5 MG tablet   Yes No   Sig: Take 5 mg by mouth 2 times daily   beclomethasone HFA (QVAR REDIHALER) 40 MCG/ACT inhaler   No No   Sig: Inhale 1 puff into the lungs 2 times daily   cyanocolbalamin (VITAMIN  B-12) 1000 MCG tablet   No No   Sig: Take 1 tablet by mouth daily.   cycloSPORINE modified (GENERIC EQUIVALENT) 25 MG capsule   No No   Sig: Take 5 capsules (125 mg) by mouth 2 times daily TAKE 5 CAPSULES (125MG) BY MOUTH TWO TIMES A DAY   ferrous sulfate (FEROSUL) 325 (65 Fe) MG tablet   No No   Sig: Take 1 tablet (325 mg) by mouth daily (with breakfast)   fluticasone (FLONASE) 50 MCG/ACT nasal spray   No No   Sig: Spray 1 spray into both nostrils daily   Patient taking differently: Spray 1 spray into both nostrils daily as needed   furosemide (LASIX) 40 MG tablet   No No   Sig: Take 1 tablet (40 mg) by mouth daily   gabapentin (NEURONTIN) 300 MG capsule   No No   Sig: Take 2 capsules (600 mg) by mouth At Bedtime   latanoprost (XALATAN) 0.005 % ophthalmic solution   No No   Sig: Place 1 drop into both eyes At Bedtime   levalbuterol (XOPENEX HFA) 45 MCG/ACT inhaler   No No   Sig: Inhale 2 puffs into the lungs every 6 hours as needed for shortness of breath / dyspnea or wheezing   metFORMIN (GLUCOPHAGE-XR) 500 MG 24 hr tablet   No No   Sig: Take 3 tablets (1,500 mg) by mouth daily (with dinner)   mycophenolic acid (GENERIC EQUIVALENT) 180 MG EC tablet   Yes No   Sig: Take 1 tablet (180 mg) by mouth 2 times daily Due to COVID positive.   nystatin (MYCOSTATIN) 193932 UNIT/GM external cream   No No   Sig: Apply topically 2 times daily To toenails.   ondansetron (ZOFRAN-ODT) 4 MG ODT tab   No No   Sig: Take 1 tablet (4 mg) by mouth every 6 hours as  needed for nausea   order for DME   No No   Sig: Walker with front wheels and a seat.   pantoprazole (PROTONIX) 40 MG EC tablet   No No   Sig: Take 1 tablet (40 mg) by mouth daily   prazosin (MINIPRESS) 2 MG capsule   No No   Sig: Take 1 capsule (2 mg) by mouth At Bedtime   prazosin (MINIPRESS) 5 MG capsule   No No   Sig: Take 3 x 5mg (15mg) caps + 1 x 2mg caps at bedtime (total dose=17mg)   propranolol (INDERAL) 20 MG tablet   No No   Sig: Take 40 (2 tablets) mg in the morning, 20 mg (1 tablet) in the afternoon, and 20 mg (1 tablet) in the evening.   simvastatin (ZOCOR) 20 MG tablet   No No   Sig: Take 1 tablet (20 mg) by mouth At Bedtime   sulfamethoxazole-trimethoprim (BACTRIM) 400-80 MG tablet   No No   Sig: Take 1 tablet by mouth daily   topiramate (TOPAMAX) 200 MG tablet   No No   Sig: Take 1 tablet (200 mg) by mouth 2 times daily   vilazodone (VIIBRYD) 10 MG TABS tablet   No No   Sig: Take 10mg tab with 40mg tab for total daily dose of 50 mg   vilazodone (VIIBRYD) 40 MG TABS tablet   No No   Sig: Take 40mg tab with 10mg tab for total daily dose of 50 mg      Facility-Administered Medications: None         Allergies   Allergies   Allergen Reactions     Percocet [Oxycodone-Acetaminophen] Nausea and Vomiting     Novocain [Procaine Hcl] Hives     Had reaction 25 years ago to old renetta. Pt reports multiple injections of lidocaine since then without reaction.  Tolerated lidocaine injection today without difficulty.  Osmar Mark MD IR Service.       Physical Exam   Vital Signs: Temp: 98.5  F (36.9  C) Temp src: Oral BP: (!) 155/59 Pulse: 54   Resp: 20 SpO2: 97 %      Weight: 185 lbs 0 oz    Constitutional - alert, resting in bed, appears comfortable  Head - normocephalic, atraumatic  ENT - normal eye lids and lashes, no conjunctival hyperemia, no icterus, extraocular movements are normal, normal nose, no discharge, moist oral mucosa, no ulcers or exudates, normal external ear  Neck - no thyromegaly or  lymphadenopathy. Tracheal is midline  CV - regular rate and rhythm, no murmurs, no edema  Pulmonary - lungs are clear to auscultation bilaterally, no wheezing or rhonchi  GI - abdomen is soft, non distended, mild tenderness in lower abdomen, bowel sounds are present, no organomegaly  Neurological - alert and oriented, normal speech, no focal deficits  Musculoskeletal - no joint erythema or swelling, ROM is ok  Skin-no rashes or ulcers      Data   Data reviewed today: I reviewed all medications, new labs and imaging results over the last 24 hours. I personally reviewed the chest x-ray image(s) showing Clear lungs.    Recent Labs   Lab 04/26/22  1311   WBC 3.4*   HGB 13.5   MCV 93   *      POTASSIUM 3.8   CHLORIDE 112*   CO2 22   BUN 16   CR 1.21*   ANIONGAP 5   MARY 8.5   *   ALBUMIN 3.0*   PROTTOTAL 5.8*   BILITOTAL 0.4   ALKPHOS 52   ALT 50   AST 30     Recent Results (from the past 24 hour(s))   XR Chest Port 1 View    Narrative    CHEST ONE VIEW PORTABLE   4/26/2022 1:29 PM     HISTORY:  Worsening cough. COVID-19 positive.    COMPARISON: 1/18/2022.      Impression    IMPRESSION: Mild elevation of the left hemidiaphragm. Old right rib  fractures. Chest otherwise negative. Lungs clear.

## 2022-04-26 NOTE — TELEPHONE ENCOUNTER
PRIOR AUTHORIZATION DENIED    Medication: Qvar Redihaler 40MCG/ACT    Denial Date: 4/25/2022    Denial Rational: Patient has to have tried two covered formulary alternatives, she has tried one (Flovent). She must also try Arnuity Ellipta.             Appeal Information:

## 2022-04-26 NOTE — ED TRIAGE NOTES
Pt reports testing positive for COVID after an appointment on Saturday for her colonoscopy. Pt states that since she has had increased symptoms of fatigue, HA, and cough.     Triage Assessment     Row Name 04/26/22 1112       Triage Assessment (Adult)    Airway WDL X    Additional Documentation Headache Assessment (Group)       Respiratory WDL    Respiratory WDL cough;X    Cough Frequency infrequent    Cough Type nonproductive       Skin Circulation/Temperature WDL    Skin Circulation/Temperature WDL WDL       Cardiac WDL    Cardiac WDL WDL       Headache Assessment    Headache Location generalized

## 2022-04-27 VITALS
HEART RATE: 50 BPM | OXYGEN SATURATION: 99 % | RESPIRATION RATE: 20 BRPM | WEIGHT: 190.5 LBS | SYSTOLIC BLOOD PRESSURE: 125 MMHG | TEMPERATURE: 97.8 F | BODY MASS INDEX: 35.97 KG/M2 | HEIGHT: 61 IN | DIASTOLIC BLOOD PRESSURE: 76 MMHG

## 2022-04-27 LAB
ANION GAP SERPL CALCULATED.3IONS-SCNC: 8 MMOL/L (ref 3–14)
BUN SERPL-MCNC: 11 MG/DL (ref 7–30)
CALCIUM SERPL-MCNC: 7.6 MG/DL (ref 8.5–10.1)
CHLORIDE BLD-SCNC: 113 MMOL/L (ref 94–109)
CO2 SERPL-SCNC: 19 MMOL/L (ref 20–32)
CREAT SERPL-MCNC: 0.79 MG/DL (ref 0.52–1.04)
CRP SERPL-MCNC: 3.7 MG/L (ref 0–8)
D DIMER PPP FEU-MCNC: 0.29 UG/ML FEU (ref 0–0.5)
ERYTHROCYTE [DISTWIDTH] IN BLOOD BY AUTOMATED COUNT: 14.3 % (ref 10–15)
GFR SERPL CREATININE-BSD FRML MDRD: 83 ML/MIN/1.73M2
GLUCOSE BLD-MCNC: 149 MG/DL (ref 70–99)
HCT VFR BLD AUTO: 32.4 % (ref 35–47)
HGB BLD-MCNC: 10.1 G/DL (ref 11.7–15.7)
MCH RBC QN AUTO: 29.6 PG (ref 26.5–33)
MCHC RBC AUTO-ENTMCNC: 31.2 G/DL (ref 31.5–36.5)
MCV RBC AUTO: 95 FL (ref 78–100)
PLATELET # BLD AUTO: 90 10E3/UL (ref 150–450)
POTASSIUM BLD-SCNC: 3.4 MMOL/L (ref 3.4–5.3)
RBC # BLD AUTO: 3.41 10E6/UL (ref 3.8–5.2)
SODIUM SERPL-SCNC: 140 MMOL/L (ref 133–144)
WBC # BLD AUTO: 1.8 10E3/UL (ref 4–11)

## 2022-04-27 PROCEDURE — 80048 BASIC METABOLIC PNL TOTAL CA: CPT | Performed by: INTERNAL MEDICINE

## 2022-04-27 PROCEDURE — 250N000011 HC RX IP 250 OP 636: Performed by: INTERNAL MEDICINE

## 2022-04-27 PROCEDURE — 250N000012 HC RX MED GY IP 250 OP 636 PS 637: Performed by: HOSPITALIST

## 2022-04-27 PROCEDURE — 86140 C-REACTIVE PROTEIN: CPT | Performed by: INTERNAL MEDICINE

## 2022-04-27 PROCEDURE — 250N000013 HC RX MED GY IP 250 OP 250 PS 637: Performed by: INTERNAL MEDICINE

## 2022-04-27 PROCEDURE — G0378 HOSPITAL OBSERVATION PER HR: HCPCS

## 2022-04-27 PROCEDURE — 85379 FIBRIN DEGRADATION QUANT: CPT | Performed by: INTERNAL MEDICINE

## 2022-04-27 PROCEDURE — 36415 COLL VENOUS BLD VENIPUNCTURE: CPT | Performed by: INTERNAL MEDICINE

## 2022-04-27 PROCEDURE — 96365 THER/PROPH/DIAG IV INF INIT: CPT

## 2022-04-27 PROCEDURE — 99217 PR OBSERVATION CARE DISCHARGE: CPT | Performed by: HOSPITALIST

## 2022-04-27 PROCEDURE — 96366 THER/PROPH/DIAG IV INF ADDON: CPT

## 2022-04-27 PROCEDURE — 85027 COMPLETE CBC AUTOMATED: CPT | Performed by: INTERNAL MEDICINE

## 2022-04-27 PROCEDURE — 258N000003 HC RX IP 258 OP 636: Performed by: INTERNAL MEDICINE

## 2022-04-27 PROCEDURE — 250N000012 HC RX MED GY IP 250 OP 636 PS 637: Performed by: INTERNAL MEDICINE

## 2022-04-27 RX ORDER — PREDNISONE 10 MG/1
10 TABLET ORAL DAILY
Qty: 7 TABLET | Refills: 0 | Status: SHIPPED | OUTPATIENT
Start: 2022-04-27 | End: 2022-05-04

## 2022-04-27 RX ORDER — PREDNISONE 5 MG/1
10 TABLET ORAL DAILY
Status: DISCONTINUED | OUTPATIENT
Start: 2022-04-27 | End: 2022-04-27 | Stop reason: HOSPADM

## 2022-04-27 RX ADMIN — SULFAMETHOXAZOLE AND TRIMETHOPRIM 1 TABLET: 400; 80 TABLET ORAL at 08:15

## 2022-04-27 RX ADMIN — ARIPIPRAZOLE 3 MG: 2 TABLET ORAL at 08:15

## 2022-04-27 RX ADMIN — REMDESIVIR 200 MG: 100 INJECTION, POWDER, LYOPHILIZED, FOR SOLUTION INTRAVENOUS at 00:11

## 2022-04-27 RX ADMIN — Medication 600 MG: at 01:14

## 2022-04-27 RX ADMIN — PRAZOSIN HYDROCHLORIDE 17 MG: 5 CAPSULE ORAL at 01:14

## 2022-04-27 RX ADMIN — PROPRANOLOL HYDROCHLORIDE 20 MG: 20 TABLET ORAL at 00:21

## 2022-04-27 RX ADMIN — PROPRANOLOL HYDROCHLORIDE 40 MG: 40 TABLET ORAL at 08:15

## 2022-04-27 RX ADMIN — ALBUTEROL SULFATE 2 PUFF: 90 AEROSOL, METERED RESPIRATORY (INHALATION) at 09:51

## 2022-04-27 RX ADMIN — MYCOPHENOLIC ACID 180 MG: 180 TABLET, DELAYED RELEASE ORAL at 09:50

## 2022-04-27 RX ADMIN — VILAZODONE HYDROCHLORIDE 50 MG: 20 TABLET ORAL at 08:14

## 2022-04-27 RX ADMIN — LATANOPROST 1 DROP: 50 SOLUTION/ DROPS OPHTHALMIC at 00:20

## 2022-04-27 RX ADMIN — SIMVASTATIN 20 MG: 20 TABLET, FILM COATED ORAL at 00:08

## 2022-04-27 RX ADMIN — PANTOPRAZOLE SODIUM 40 MG: 40 TABLET, DELAYED RELEASE ORAL at 08:15

## 2022-04-27 RX ADMIN — Medication 600 MG: at 08:15

## 2022-04-27 RX ADMIN — CYCLOSPORINE 125 MG: 25 CAPSULE, LIQUID FILLED ORAL at 01:14

## 2022-04-27 RX ADMIN — MYCOPHENOLIC ACID 180 MG: 180 TABLET, DELAYED RELEASE ORAL at 01:13

## 2022-04-27 RX ADMIN — FAMOTIDINE 20 MG: 20 TABLET ORAL at 08:15

## 2022-04-27 RX ADMIN — APIXABAN 5 MG: 5 TABLET, FILM COATED ORAL at 08:15

## 2022-04-27 RX ADMIN — CYCLOSPORINE 125 MG: 25 CAPSULE, LIQUID FILLED ORAL at 09:50

## 2022-04-27 RX ADMIN — SODIUM CHLORIDE, POTASSIUM CHLORIDE, SODIUM LACTATE AND CALCIUM CHLORIDE: 600; 310; 30; 20 INJECTION, SOLUTION INTRAVENOUS at 01:22

## 2022-04-27 RX ADMIN — PREDNISONE 10 MG: 5 TABLET ORAL at 11:13

## 2022-04-27 NOTE — DISCHARGE SUMMARY
[facility]  Hospitalist Discharge Summary      Date of Admission:  2022  Date of Discharge:  2022  Discharging Provider: Kenton Goetz MD  Discharge Service: Hospitalist Service    Discharge Diagnoses   1. COVID-19 infection   2. Pancytopenia likely secondary to COVID-19 infection in the setting of immunosuppression      Follow-ups Needed After Discharge   Follow-up Appointments     Follow-up and recommended labs and tests       Follow up with primary care provider, Horacio Sheridan, within 7 days for   hospital follow- up.  The following labs/tests are recommended: CBC and   BMP.  Follow up with renal transplant coordinator this week..           Unresulted Labs Ordered in the Past 30 Days of this Admission     No orders found for last 31 day(s).          Discharge Disposition   Discharged to home  Condition at discharge: Stable    Hospital Course   Elizabeth Palma is a 66 year old female, s/p kidney transplant, hypertension, diabetes mellitus type 2, PE who presents  on 2022 with generalized weakness, diarrhea and recent diagnosis of COVID-19.     #.  Acute illness due to lab confirmed COVID-19  #.  Generalized weakness secondary to COVID-19  #.  Mild pancytopenia-likely secondary to COVID-19  -Tested positive on .  Symptom onset   -Symptoms-generalized weakness, lethargy/fatigue, sore throat, mild cough, diarrhea, anorexia  -Vaccination status-has received 3 doses of COVID-vaccine  -Chest x-ray-clear, no infiltrates  -Afebrile, not hypoxic  -not a candidate for Paxlovid per her nephrologist   -follow up soon with her transplant team      #.  S/p  donor kidney transplant, , for end-stage renal disease due to polycystic kidney disease  -Renal function stable and at baseline  -Continue cyclosporine  Stop mycophenolic acid and start prednisone- discussed with her nephrologist   -Continue prophylactic Bactrim     #.  Segmental right lower lobe PE without  right heart strain, 1/21/2022  -On indefinite anticoagulation with Eliquis, continue     #.  Diabetes mellitus type 2 with peripheral neuropathy, controlled with most recent hemoglobin A1c 7.3  -Continue metformin  -Monitor blood sugars twice daily  -Continue gabapentin     #.  Essential hypertension  #.  Hyperlipidemia  -Stable   -Continue propanolol and can resume statin     #.  Generalized anxiety disorder  #.  Major depression  #.  PTSD  -Continue Abilify, prazosin, will consider  -Follows up with psychiatry     #.  S/p gastric bypass, 1990  -Continue Protonix     #.  Pancreatic cyst, likely IPMN  -Has been assessed by GI 3/2022.   -follow up with GI in 1 year with repeat MRI/MRCP for follow-up     #.  Tremors  -Continue propanolol    Consultations This Hospital Stay   PHYSICAL THERAPY ADULT IP CONSULT    Code Status   Full Code    Time Spent on this Encounter   I, Kenton Goetz MD, personally saw the patient today and spent greater than 30 minutes discharging this patient.       Kenton Goetz MD  [facility] EXTENDED RECOVERY AND SHORT STAY  [address] [place] [id number]  Phone: [phone]  ______________________________________________________________________    Physical Exam   Vital Signs: Temp: 97.8  F (36.6  C) Temp src: Axillary BP: 125/76 Pulse: 50   Resp: 20 SpO2: 99 % O2 Device: None (Room air)    Weight: 190 lbs 8 oz  Constitutional: awake, alert, cooperative, no apparent distress       Primary Care Physician   Horacio Sheridan    Discharge Orders      Reason for your hospital stay    COVID-19 infection     Activity    Your activity upon discharge: activity as tolerated     Follow-up and recommended labs and tests     Follow up with primary care provider, Horacio Sheridan, within 7 days for hospital follow- up.  The following labs/tests are recommended: CBC and BMP.  Follow up with renal transplant coordinator this week..     Diet    Follow this diet upon  discharge: Orders Placed This Encounter      Combination Diet Regular Diet Adult       Significant Results and Procedures   Most Recent 3 CBC's:Recent Labs   Lab Test 22  0628 22  1311 22  0914   WBC 1.8* 3.4* 4.9   HGB 10.1* 13.5 14.0   MCV 95 93 92   PLT 90* 135* 176     Most Recent 3 BMP's:Recent Labs   Lab Test 22  0628 22  1311 22  1659    139 140   POTASSIUM 3.4 3.8 3.7   CHLORIDE 113* 112* 109   CO2 19* 22 25   BUN 11 16 15   CR 0.79 1.21* 1.12*   ANIONGAP 8 5 6   MARY 7.6* 8.5 8.8   * 175* 122*   ,   Results for orders placed or performed during the hospital encounter of 22   XR Chest Port 1 View    Narrative    CHEST ONE VIEW PORTABLE   2022 1:29 PM     HISTORY:  Worsening cough. COVID-19 positive.    COMPARISON: 2022.      Impression    IMPRESSION: Mild elevation of the left hemidiaphragm. Old right rib  fractures. Chest otherwise negative. Lungs clear.     ERNST ORTIZ MD         SYSTEM ID:  [id number]     *Note: Due to a large number of results and/or encounters for the requested time period, some results have not been displayed. A complete set of results can be found in Results Review.       Discharge Medications   Current Discharge Medication List      START taking these medications    Details   predniSONE (DELTASONE) 10 MG tablet Take 1 tablet (10 mg) by mouth daily for 7 days  Qty: 7 tablet, Refills: 0    Associated Diagnoses: -donor kidney transplant         CONTINUE these medications which have NOT CHANGED    Details   acetylcysteine (N-ACETYL CYSTEINE) 600 MG CAPS capsule Take 1 capsule (600 mg) by mouth 2 times daily (Patient does not know what strength they are taking.)  Qty: 60 capsule, Refills: 3    Associated Diagnoses: Excoriation (skin-picking) disorder      amylase-lipase-protease (CREON 24) 46825-80967 units CPEP per EC capsule Take 3 capsules by mouth 3 times daily (with meals)  Qty: 810 capsule, Refills: 11     Associated Diagnoses: Pancreatic insufficiency      apixaban ANTICOAGULANT (ELIQUIS) 5 MG tablet Take 5 mg by mouth 2 times daily      ARIPiprazole (ABILIFY) 2 MG tablet Take 1.5 tablets (3 mg) by mouth 2 times daily  Qty: 45 tablet, Refills: 3    Associated Diagnoses: Major depressive disorder, recurrent episode, moderate (H)      beclomethasone HFA (QVAR REDIHALER) 40 MCG/ACT inhaler Inhale 1 puff into the lungs 2 times daily  Qty: 10.6 g, Refills: 3    Associated Diagnoses: Moderate persistent asthma without complication      Cholecalciferol (VITAMIN D) 1000 UNITS capsule Take 1,000 Units by mouth daily  Qty: 30 capsule      cyanocolbalamin (VITAMIN  B-12) 1000 MCG tablet Take 1 tablet by mouth daily.  Qty: 30 tablet, Refills: 0    Associated Diagnoses: CKD (chronic kidney disease) stage 5, GFR less than 15 ml/min (H)      cycloSPORINE modified (GENERIC EQUIVALENT) 25 MG capsule Take 5 capsules (125 mg) by mouth 2 times daily TAKE 5 CAPSULES (125MG) BY MOUTH TWO TIMES A DAY  Qty: 300 capsule, Refills: 11    Associated Diagnoses: Kidney replaced by transplant      ferrous sulfate (FEROSUL) 325 (65 Fe) MG tablet Take 1 tablet (325 mg) by mouth daily (with breakfast)  Qty: 100 tablet, Refills: 0    Comments: Refills should be available for transfer from [facility] pharmacy in [place]  Associated Diagnoses: Iron deficiency      fluticasone (FLONASE) 50 MCG/ACT nasal spray Spray 1 spray into both nostrils daily  Qty: 48 g, Refills: 3    Associated Diagnoses: Seasonal allergic rhinitis      gabapentin (NEURONTIN) 300 MG capsule Take 2 capsules (600 mg) by mouth At Bedtime  Qty: 180 capsule, Refills: 2    Comments: ZERO refills remain on this prescription. Your patient is requesting advance approval of refills for this medication to PREVENT ANY MISSED DOSES  Associated Diagnoses: Type 2 diabetes mellitus with diabetic polyneuropathy, without long-term current use of insulin (H)      Lactobacillus Acid-Pectin  (LACTOBACILLUS ACIDOPHILUS) TABS Take 1 tablet by mouth daily (with dinner)      latanoprost (XALATAN) 0.005 % ophthalmic solution Place 1 drop into both eyes At Bedtime  Qty: 7.5 mL, Refills: 4    Associated Diagnoses: Mild stage glaucoma      levalbuterol (XOPENEX HFA) 45 MCG/ACT inhaler Inhale 2 puffs into the lungs every 6 hours as needed for shortness of breath / dyspnea or wheezing  Qty: 15 g, Refills: 3    Comments: Side effects to albuterol  Associated Diagnoses: Moderate persistent asthma without complication      LORazepam (ATIVAN) 0.5 MG tablet Take 1 tab (0.5mg) twice weekly as need for anxiety  Qty: 30 tablet, Refills: 0    Associated Diagnoses: Major depressive disorder, recurrent episode, moderate (H)      metFORMIN (GLUCOPHAGE-XR) 500 MG 24 hr tablet Take 3 tablets (1,500 mg) by mouth daily (with dinner)  Qty: 270 tablet, Refills: 1    Associated Diagnoses: Type 2 diabetes mellitus with diabetic polyneuropathy, without long-term current use of insulin (H)      nystatin (MYCOSTATIN) 828276 UNIT/GM external cream Apply topically 2 times daily To toenails.  Qty: 90 g, Refills: 5    Associated Diagnoses: Onychomycosis      ondansetron (ZOFRAN-ODT) 4 MG ODT tab Take 1 tablet (4 mg) by mouth every 6 hours as needed for nausea  Qty: 20 tablet, Refills: 4    Associated Diagnoses: Chronic kidney disease, stage V (H)      pantoprazole (PROTONIX) 40 MG EC tablet Take 1 tablet (40 mg) by mouth daily  Qty: 90 tablet, Refills: 3    Comments: This is instead of omeprazole.  Associated Diagnoses: Gastroesophageal reflux disease with esophagitis without hemorrhage      Polyvinyl Alcohol-Povidone (REFRESH OP) Apply to eye as needed Both eyes      !! prazosin (MINIPRESS) 2 MG capsule Take 1 capsule (2 mg) by mouth At Bedtime  Qty: 30 capsule, Refills: 3    Associated Diagnoses: Nightmares associated with chronic post-traumatic stress disorder      !! prazosin (MINIPRESS) 5 MG capsule Take 3 x 5mg (15mg) caps + 1 x 2mg  caps at bedtime (total dose=17mg)  Qty: 90 capsule, Refills: 3    Associated Diagnoses: Nightmares associated with chronic post-traumatic stress disorder      !! propranolol (INDERAL) 20 MG tablet Take 20 mg by mouth 2 times daily Afternoon & evening.      !! propranolol (INDERAL) 40 MG tablet Take 40 mg by mouth every morning      psyllium (METAMUCIL/KONSYL) capsule Take 5 capsules by mouth every evening With supper.      sulfamethoxazole-trimethoprim (BACTRIM) 400-80 MG tablet Take 1 tablet by mouth daily  Qty: 90 tablet, Refills: 3    Associated Diagnoses: Immunosuppression (H); Kidney transplanted      topiramate (TOPAMAX) 200 MG tablet Take 1 tablet (200 mg) by mouth 2 times daily  Qty: 60 tablet, Refills: 11    Associated Diagnoses: Morbid obesity due to excess calories (H)      Vaginal Lubricant (REPLENS) GEL Use vaginally as needed. Can use up to 3 times per week.  Qty: 35 g, Refills: 11    Associated Diagnoses: Vaginal dryness      !! vilazodone (VIIBRYD) 10 MG TABS tablet Take 10mg tab with 40mg tab for total daily dose of 50 mg  Qty: 30 tablet, Refills: 3    Associated Diagnoses: Major depressive disorder, recurrent episode, moderate (H)      !! vilazodone (VIIBRYD) 40 MG TABS tablet Take 40mg tab with 10mg tab for total daily dose of 50 mg  Qty: 30 tablet, Refills: 3    Associated Diagnoses: Major depressive disorder, recurrent episode, moderate (H)      acetaminophen (TYLENOL) 500 MG tablet Take 1,500 mg by mouth every 6 hours as needed for mild pain      order for DME Walker with front wheels and a seat.  Qty: 1 Units, Refills: 0    Associated Diagnoses: Fall, initial encounter       !! - Potential duplicate medications found. Please discuss with provider.      STOP taking these medications       furosemide (LASIX) 40 MG tablet Comments:   Reason for Stopping:         mycophenolic acid (GENERIC EQUIVALENT) 180 MG EC tablet Comments:   Reason for Stopping:         simvastatin (ZOCOR) 20 MG tablet  Comments:   Reason for Stopping:             Allergies   Allergies   Allergen Reactions     Percocet [Oxycodone-Acetaminophen] Nausea and Vomiting     Novocain [Procaine Hcl] Hives     Had reaction 25 years ago to old renetta. Pt reports multiple injections of lidocaine since then without reaction.  Tolerated lidocaine injection today without difficulty.  Osmar Mark MD IR Service.

## 2022-04-27 NOTE — PLAN OF CARE
"Goal Outcome Evaluation:  Orientation/Cognitive: A&O X4, forgetful   Observation Goals (Met/ Not Met): Not met   Mobility Level/Assist Equipment: Ax1 GB/Walker up to BSC  Fall Risk (Y/N): Y  Behavior Concerns: None  Pain Management: PRN tylenol   Tele/VS/O2: VSS on RA except bradycardia   ABNL Lab/BG: Covid positive   Diet: Regular   Bowel/Bladder: Continent., Loose BM x1  Skin Concerns: Scattered bruises   Drains/Devices: IVF LR @100 ml/hr   Tests/Procedures for next shift: PT consult   Anticipated DC date & active delays: 1-2 days   Patient Stated Goal for Today: Rest     PRIMARY DIAGNOSIS: \"GENERIC\" NURSING  OUTPATIENT/OBSERVATION GOALS TO BE MET BEFORE DISCHARGE:  1. ADLs back to baseline: Not met      2. Activity and level of assistance: Not met      3. Pain status: Partially met      4. Return to near baseline physical activity: Not met                 Discharge Planner Nurse   Safe discharge environment identified: Not met                     "

## 2022-04-27 NOTE — PROGRESS NOTES
"PRIMARY DIAGNOSIS: \"GENERIC\" NURSING  OUTPATIENT/OBSERVATION GOALS TO BE MET BEFORE DISCHARGE:  1. ADLs back to baseline: Not met     2. Activity and level of assistance: Not met     3. Pain status: Partially met     4. Return to near baseline physical activity: Not met      Discharge Planner Nurse   Safe discharge environment identified: Not met        "

## 2022-04-27 NOTE — PLAN OF CARE
Goal Outcome Evaluation:    Plan of Care Reviewed With: patient     Orientation/Cognitive: AxOx4, forgetful   Observation Goals (Met/ Not Met): Met  Mobility Level/Assist Equipment: Ax1 GB/Walker  Fall Risk (Y/N): Yes  Behavior Concerns: None  Pain Management: PRN Tylenol  Tele/VS/O2: VSS on RA except bradycardia   ABNL Lab/BG: Covid positive   Diet: Regular   Bowel/Bladder: Continent, using BSC  Skin Concerns: Scattered bruises   Drains/Devices: PIV out  Tests/Procedures for next shift: NA, discharging today, follow up appointments scheduled   Anticipated DC date & active delays: Today  Patient Stated Goal for Today: Go home

## 2022-04-27 NOTE — PROGRESS NOTES
RECEIVING UNIT ED HANDOFF REVIEW    ED Nurse Handoff Report was reviewed by: Kelley Nathan RN on April 26, 2022 at 10:30 PM

## 2022-04-28 ENCOUNTER — TELEPHONE (OUTPATIENT)
Dept: NEPHROLOGY | Facility: CLINIC | Age: 67
End: 2022-04-28
Payer: MEDICARE

## 2022-04-28 NOTE — TELEPHONE ENCOUNTER
[facility]    Phone Message    May a detailed message be left on voicemail: yes     Reason for Call: Appointment Intake    Referring Provider Name: Julian  Diagnosis and/or Symptoms: The patient called to reschedule VV on 5/4/22. Writer could not find a schedule for this. Please advise. Thank you.     Action Taken: Message routed to:  [facility] Nephrology    Travel Screening: Not Applicable

## 2022-04-29 NOTE — TELEPHONE ENCOUNTER
Patient calling to request apt change next week. Apt changed to 5/4 at 1000 patient aware.    Marivel Marcelo RN   Transplant Coordinator  [phone]

## 2022-05-02 ENCOUNTER — TELEPHONE (OUTPATIENT)
Dept: OPHTHALMOLOGY | Facility: CLINIC | Age: 67
End: 2022-05-02
Payer: MEDICARE

## 2022-05-02 DIAGNOSIS — Z94.0 KIDNEY REPLACED BY TRANSPLANT: Primary | ICD-10-CM

## 2022-05-02 RX ORDER — CYCLOSPORINE 25 MG/1
125 CAPSULE, LIQUID FILLED ORAL 2 TIMES DAILY
Qty: 300 CAPSULE | Refills: 11 | Status: SHIPPED | OUTPATIENT
Start: 2022-05-02 | End: 2022-07-18

## 2022-05-02 NOTE — TELEPHONE ENCOUNTER
[facility]    Phone Message    May a detailed message be left on voicemail: yes     Reason for Call: Other: Pt needs to schedule a tech appt, please call pt back to schedule    Thank you,    Action Taken: Message routed to:  [facility] eye    Travel Screening: Not Applicable

## 2022-05-03 ENCOUNTER — TELEPHONE (OUTPATIENT)
Dept: TRANSPLANT | Facility: CLINIC | Age: 67
End: 2022-05-03
Payer: MEDICARE

## 2022-05-03 NOTE — TELEPHONE ENCOUNTER
----- Message from Francisco Jordan MD sent at 4/27/2022 10:28 AM CDT -----  Regarding: covid update  She presented to [facility]. Her symptoms are mild. I spoke to the nephrologist, Dr. Tanner Vo, and the hospitalist there. Plan is to hold MPA, start prednisone 10mg daily for now. Call her on Monday of next week and if doing better can restart MPA at 180mg bid, stop pred. A few days after that if doing better can uptitrate MPA.     Thanks,  Francisco      Spoke with patient's  who reports patient was admitted to [facility] yesterday 5/2.  EMS was called due to increased weakness, lethargy, and fever.  Will update transplant provider.    Marivel Marcelo RN   Transplant Coordinator  [phone]

## 2022-05-03 NOTE — TELEPHONE ENCOUNTER
I contacted the patient after speaking to the provider.  If the patient is able to come in this month she may have an Orthoptist /tech visit.  However, she states she is currently admitted and will call us when she is able to come to appointment.

## 2022-05-04 ENCOUNTER — TELEPHONE (OUTPATIENT)
Dept: GASTROENTEROLOGY | Facility: CLINIC | Age: 67
End: 2022-05-04

## 2022-05-04 ENCOUNTER — DOCUMENTATION ONLY (OUTPATIENT)
Dept: NEPHROLOGY | Facility: CLINIC | Age: 67
End: 2022-05-04
Payer: MEDICARE

## 2022-05-04 DIAGNOSIS — Z12.11 ENCOUNTER FOR SCREENING COLONOSCOPY: Primary | ICD-10-CM

## 2022-05-04 RX ORDER — BISACODYL 5 MG/1
TABLET, DELAYED RELEASE ORAL
Qty: 4 TABLET | Refills: 0 | Status: SHIPPED | OUTPATIENT
Start: 2022-05-04 | End: 2022-06-07

## 2022-05-04 NOTE — TELEPHONE ENCOUNTER
Patient scheduled for Colonoscopy on 5/11/22.     Covid test scheduled: COVID positive on 4/23/22. Need to determine if the Pt was symptomatic. If symptomatic procedure will need to wait until after 6/4/22. Non symptomatic would be 5/7/22.     Arrival time: 10:25am    [place] [facility] [place] type: [place]    Indication for procedure: Screening, previous tubulovillous adenoma     Anticoagulations? Eliquis Holding interval of 2 days    On Metformin, should hold the morning of procedure.     Bowel prep recommendation: GoLytely -- CKD/Kidney transplant     Golytely prep sent to [id number] - [place], [place] - [address] [facility] [address]. Prep instructions sent via [employer]    Pre visit planning completed.    Yamile Quigley RN

## 2022-05-04 NOTE — TELEPHONE ENCOUNTER
Called the Pt to discuss below. She is currently admitted in [facility]. The Pt does not want to cancel at this time, will keep encounter open and check on status next week. Likely will need to cancel.     Yamile Quigley RN   -[facility]

## 2022-05-04 NOTE — PROGRESS NOTES
I spoke to the hospitalist at [facility]. She likely has pyelonephritis with multiple organisms and is on ceftriaxone, awaiting UCx.     I have asked them to restart myfortic 180mg bid and stop prednisone, continue CsA at current dose as her symptoms of covid have resolved. If on Monday 5/9 she is doing better can increase MPA back to baseline dose of 360mg bid.     Francisco Jordan MD, CARO  Transplant Nephrology  Pager: [phone]

## 2022-05-09 ENCOUNTER — TELEPHONE (OUTPATIENT)
Dept: GASTROENTEROLOGY | Facility: CLINIC | Age: 67
End: 2022-05-09
Payer: MEDICARE

## 2022-05-09 ENCOUNTER — TELEPHONE (OUTPATIENT)
Dept: NEPHROLOGY | Facility: CLINIC | Age: 67
End: 2022-05-09
Payer: MEDICARE

## 2022-05-09 DIAGNOSIS — Z79.899 ENCOUNTER FOR LONG-TERM CURRENT USE OF MEDICATION: ICD-10-CM

## 2022-05-09 DIAGNOSIS — Z94.0 KIDNEY REPLACED BY TRANSPLANT: Primary | ICD-10-CM

## 2022-05-09 DIAGNOSIS — Z48.298 AFTERCARE FOLLOWING ORGAN TRANSPLANT: ICD-10-CM

## 2022-05-09 NOTE — TELEPHONE ENCOUNTER
Caller: Lisette/     Procedure: COLONOSOCPY     Date, Location, and Surgeon of Procedure Cancelled: 5/11 - [facility] ROSARIO     Ordering Provider: STACIA WALLACE     Reason for cancel (please be detailed, any staff messages or encounters to note?):     PER PT -- CHANGE DATE         Rescheduled: YES      If rescheduled:    Date: 06/08/2022   Location: [facility] [place]    Prep Resent: YES (changes to prep?)   Covid Test Rescheduled: NO -- WITHIN 90 DAYS    Note any change or update to original order/sedation: N/A

## 2022-05-09 NOTE — TELEPHONE ENCOUNTER
[facility]    Phone Message    May a detailed message be left on voicemail: yes     Reason for Call: Order(s): Other:   Reason for requested: labs  Date needed: before appt on 06/07/22  Provider name: Julian      Action Taken: Other: Nephrology    Travel Screening: Not Applicable

## 2022-05-11 ENCOUNTER — TELEPHONE (OUTPATIENT)
Dept: INTERNAL MEDICINE | Facility: CLINIC | Age: 67
End: 2022-05-11
Payer: MEDICARE

## 2022-05-11 ENCOUNTER — MEDICAL CORRESPONDENCE (OUTPATIENT)
Dept: HEALTH INFORMATION MANAGEMENT | Facility: CLINIC | Age: 67
End: 2022-05-11

## 2022-05-11 NOTE — TELEPHONE ENCOUNTER
[facility]    Phone Message    May a detailed message be left on voicemail: yes     Reason for Call: Order(s): Home Care Orders: Other: Skilled Nursinx a week for 6 weeks, following hospital stay for covid. FYI: level 1 medication interaction between simvastatin and neoral with no symptoms    Action Taken: Message routed to:  [facility] P[facility]    Travel Screening: Not Applicable

## 2022-05-11 NOTE — TELEPHONE ENCOUNTER
Verbal orders given to Yanet from  [facility], per Dr. Sheridan, for   Order(s): Home Care Orders: Other: Skilled Nursinx a week for 6 weeks, following hospital stay for covid. FYI: level 1 medication interaction between simvastatin and neoral with no symptoms. Rosi Daniels LPN 2022 3:46 PM

## 2022-05-12 ENCOUNTER — TELEPHONE (OUTPATIENT)
Dept: INTERNAL MEDICINE | Facility: CLINIC | Age: 67
End: 2022-05-12
Payer: MEDICARE

## 2022-05-12 NOTE — TELEPHONE ENCOUNTER
[facility]    Phone Message    May a detailed message be left on voicemail: yes     Reason for Call: Order(s): Home Care Orders: Occupational Therapy (OT): Requesting to continue OT. Starting next week. 2 x week for 1 week. 1 x week for 3 weeks. To address ADL retraining, transfers, strengthening, balance, and urinary incontinence.      Stefan #: [phone]    Action Taken: Message routed to:  [facility]    Travel Screening: Not Applicable

## 2022-05-12 NOTE — TELEPHONE ENCOUNTER
Verbal orders given to Stefan from [facility], per Dr. Sheridan, for Home Care Orders: Occupational Therapy (OT): Requesting to continue OT. Starting next week. 2 x week for 1 week. 1 x week for 3 weeks. To address ADL retraining, transfers, strengthening, balance, and urinary incontinence.  Rosi Daniels LPN 5/12/2022 4:20 PM

## 2022-05-12 NOTE — TELEPHONE ENCOUNTER
[facility]    Phone Message    May a detailed message be left on voicemail: yes     Reason for Call: Order(s): Home Care Orders: Physical Therapy (PT): 1x a week for 2 weeks, 1x a week for 4 weeks for strengthening hospital follow up    Action Taken: Message routed to:  [facility]    Travel Screening: Not Applicable

## 2022-05-13 ENCOUNTER — TELEPHONE (OUTPATIENT)
Dept: TRANSPLANT | Facility: CLINIC | Age: 67
End: 2022-05-13
Payer: MEDICARE

## 2022-05-13 DIAGNOSIS — Z94.0 KIDNEY TRANSPLANTED: Primary | ICD-10-CM

## 2022-05-13 NOTE — TELEPHONE ENCOUNTER
Called Lorelei and gave verbal orders per Dr. Sheridan for Order(s): Home Care Orders: Physical Therapy (PT): 1x a week for 2 weeks, 1x a week for 4 weeks for strengthening hospital follow up    Bertin Doherty CMA (AAMA) at 9:13 AM on 5/13/2022

## 2022-05-13 NOTE — TELEPHONE ENCOUNTER
02/14/17 1805 02/14/17 1953   Vital Signs   Pulse 71 78   /80 (!) 179/92   Pt's BP was elevated upon admission and when rechecked later. Pt denied headache, nausea, and chest pain, and did not appear diaphoretic. Writer informed MD, who did not order any changes in treatment or monitoring at this time. Pt's blood glucose was 210, and MD ordered that a dose of sliding scale insulin be administered tonight. Will administer as soon as pt finishes eating a snack.   ----- Message from Francisco Jordan MD sent at 5/10/2022  8:48 AM CDT -----  Regarding: discharging  Elizabeth Orta is being discharged from [facility] today. She was admitted there with fever that was thought to be from COVID. During all of this I kept her on CsA, stopped MPA again and put her on prednisone 10mg daily. I restarted MPA 180mg bid today and stopped prednisone. Please call her later this week and if she is doing ok you can increase MPA to 360mg bid. Per the hospitalist her Scr was 0.7, WBC 3.5. Please obtain labs in 1 week. She has a follow up with me in 1 month which is fine. Thanks    Francisco    OUTCOME: patient states she continues to not feel well. Patient seems confused while speaking with writer.  Asked to speak with  who confirms new/ worsening confusion this afternoon.    Patient is afebrile,   unable to get BP as cuff not working properly.    Will keep MPA at 180 mg and follow up with patient on Monday 5/16. Encouraged patient to be seen in ED.   v/u    Marivel Marcelo RN   Transplant Coordinator  [phone]

## 2022-05-16 ENCOUNTER — MEDICAL CORRESPONDENCE (OUTPATIENT)
Dept: HEALTH INFORMATION MANAGEMENT | Facility: CLINIC | Age: 67
End: 2022-05-16

## 2022-05-16 ENCOUNTER — VIRTUAL VISIT (OUTPATIENT)
Dept: INTERNAL MEDICINE | Facility: CLINIC | Age: 67
End: 2022-05-16

## 2022-05-16 ENCOUNTER — DOCUMENTATION ONLY (OUTPATIENT)
Dept: TRANSPLANT | Facility: CLINIC | Age: 67
End: 2022-05-16

## 2022-05-16 ENCOUNTER — TELEPHONE (OUTPATIENT)
Dept: INTERNAL MEDICINE | Facility: CLINIC | Age: 67
End: 2022-05-16

## 2022-05-16 DIAGNOSIS — N39.0 URINARY TRACT INFECTION WITHOUT HEMATURIA, SITE UNSPECIFIED: Primary | ICD-10-CM

## 2022-05-16 PROCEDURE — 99213 OFFICE O/P EST LOW 20 MIN: CPT | Mod: 95 | Performed by: INTERNAL MEDICINE

## 2022-05-16 NOTE — PROGRESS NOTES
"Elizabeth is a very pleasant 66 year old who is being evaluated via a billable video visit.      Spoke to  Rahat to confirm allergies and meds.    Pt also taking Azithromycin 250mg and Cefprozil 500mg Mycophenalate, will finish the course.    How would you like to obtain your AVS? MyChart  If the video visit is dropped, the invitation should be resent by: Text to cell phone: [phone]  Will anyone else be joining your video visit? No        \"Doing better now\" was recently treated in a local ER for weakness, falls, possible UTI vs pneumonia.  Walking is better, she's getting PT and OT at home, has bilateral knee pain but that is also improving.  Concerned about bruising, she showed me her forearms on the video call.  However she is taking eliquis for a history of thrombosis.  Of note she had a covid 19 infection prior to the above visit, but feels back to baseline, she denies any cough or shortness of breath or residual symptoms.     On exam, she is alert, NAD, speaking in full sentences, no cough or wheeze noted.  Skin exam reveals scattered ecchymoses.     A/P: ER visit for UTI vs pneumonia; clinically improving on antibiotics. Advised to finish current course.  She does have an in person visit with Dr. Sheridan on 6/6/22 to further discuss knee pain.  We briefly discussed options today including possible joint injection.    Anastasiya Rivera MD        Video-Visit Details    Type of service:  Video Visit started 11:24 ended 11:36 with another 10 minutes of chart review and documentation same day    Originating Location (pt. Location): Home    Distant Location (provider location):  [facility] INTER[facility] MEDIC[facility] [place]     Platform used for Video Visit: [id number]  "

## 2022-05-16 NOTE — TELEPHONE ENCOUNTER
Health Call Center    Phone Message    May a detailed message be left on voicemail: yes     Reason for Call: Other: Patients home care nurse calling to state patient went to the ER for a UTI and possible pneumonia on 5/13/2022 and was given two new medications. they are azithromycin and sefprozil. Per home care nurse stating that medication azithromycin will interact with ondansetron and neoral with interact with Simvastin. Please call to discuss thank you.      Action Taken: Message routed to:  [facility] ([facility] pcc    Travel Screening: Not Applicable     No answer-smt

## 2022-05-17 ENCOUNTER — TELEPHONE (OUTPATIENT)
Dept: OPHTHALMOLOGY | Facility: CLINIC | Age: 67
End: 2022-05-17
Payer: MEDICARE

## 2022-05-17 ENCOUNTER — TELEPHONE (OUTPATIENT)
Dept: TRANSPLANT | Facility: CLINIC | Age: 67
End: 2022-05-17
Payer: MEDICARE

## 2022-05-17 NOTE — TELEPHONE ENCOUNTER
H/o double vision and worsening at distance times one month    No other vision changes/symtpoms     Offered orthoptic visit on Monday mornings.    Pt not able to make next Monday and following Monday holiday    Scheduled June 6th at 11 AM (after [facility] appt at 0930)    Pt seemed satisfied with date/time    Cortez King RN 2:04 PM 05/17/22          [facility]    Phone Message    May a detailed message be left on voicemail: yes     Reason for Call: Symptoms or Concerns     If patient has red-flag symptoms, warm transfer to triage line    Current symptom or concern: When looking in a distance left eye has double vision, per Pt's .  Pt is wondering if she could schedule a nurse visit instead of seeing Dr Underwood.    Symptoms have been present for:  About 1 month    Has patient previously been seen for this? No    By : Dr Underwood  Date: 5/17/22    Are there any new or worsening symptoms? Yes    Action Taken: Message routed to:  [facility] ([facility] EYE    Travel Screening: Not Applicable

## 2022-05-17 NOTE — TELEPHONE ENCOUNTER
----- Message from Francisco Jordan MD sent at 5/10/2022  8:48 AM CDT -----  Regarding: discharging  Elizabeth Orta is being discharged from [facility] today. She was admitted there with fever that was thought to be from COVID. During all of this I kept her on CsA, stopped MPA again and put her on prednisone 10mg daily. I restarted MPA 180mg bid today and stopped prednisone. Please call her later this week and if she is doing ok you can increase MPA to 360mg bid. Per the hospitalist her Scr was 0.7, WBC 3.5. Please obtain labs in 1 week. She has a follow up with me in 1 month which is fine. Thanks     Francisco    OUTCOME:  Elizabeth was seen in the ED last weekend:   Diagnosis:   1. Pain   2. Pneumonia of both lower lobes due to infectious organism   3. Urinary tract infection without hematuria, site unspecified   4. Acute pain of left knee   5. Fall, initial encounter       She was sent home on azithromycin X4 days and Cefprozil for 7 days.     Cough is becoming more infrequent, reports fatigue but states it is improving.      Francisco Jordan MD Sveiven, Sara, RN  Agree with holding off the increase in MPA until next week. Thanks     Will reassess next week prior to increasing MPA    Marivel Marcelo RN   Transplant Coordinator  [phone]

## 2022-05-25 DIAGNOSIS — F43.12 NIGHTMARES ASSOCIATED WITH CHRONIC POST-TRAUMATIC STRESS DISORDER: ICD-10-CM

## 2022-05-25 DIAGNOSIS — F51.5 NIGHTMARES ASSOCIATED WITH CHRONIC POST-TRAUMATIC STRESS DISORDER: ICD-10-CM

## 2022-05-25 RX ORDER — PRAZOSIN HYDROCHLORIDE 2 MG/1
CAPSULE ORAL
Qty: 90 CAPSULE | OUTPATIENT
Start: 2022-05-25

## 2022-05-25 NOTE — TELEPHONE ENCOUNTER
Refill denied  Too soon  script sent 3/3/22 #[id number]  needs to make an appt   Was to follow-up 4/2022  -Pt outside of RTC timeframe.  Scheduling has been notified to contact the pt for appointment.

## 2022-05-27 ENCOUNTER — TELEPHONE (OUTPATIENT)
Dept: TRANSPLANT | Facility: CLINIC | Age: 67
End: 2022-05-27
Payer: MEDICARE

## 2022-05-27 NOTE — TELEPHONE ENCOUNTER
ISSUE: RN[facility] calling to assess if patient is feeling better after recent PNA diagnosis last week.    OUTCOME: Patient states cough has subsided.  She continues to be fatigued.      Saw PCP this past week, was instructed to rest.  Dr Jordan notified. Okay per Dr Jordan to increase IS to baseline    Patient v/u of increasing MPA to baseline dose of 360 mg bid.  Will obtain transplant labs next week.    Marivel Marcelo RN   Transplant Coordinator  [phone]

## 2022-05-31 NOTE — TELEPHONE ENCOUNTER
Pre assessment questions completed for upcoming Colonoscopy procedure scheduled on 6/8/22    Reviewed procedural arrival time 10:30am and [place] [facility] [place]    [place]  policy reviewed. Instructed to have someone stay 24 hours post procedure.     Procedure indication: See below     Bowel prep recommendation: Zofia -- CKD/Kidney transplant    Reviewed Colonoscopy prep instructions with patient. No fiber/iron supplements or foods that contain nuts/seeds 7 days prior to procedure.     Anticoagulation/blood thinners? Eliquis -- Pt is aware of 2 day hold guideline. Pt will consult with managing provider on holding approval.     Electronic implanted devices? None    Patient verbalized understanding and had no questions or concerns at this time.    Yamile Quigley RN

## 2022-05-31 NOTE — TELEPHONE ENCOUNTER
Pt rescheduled for 6/8/22.     Patient scheduled for Colonoscopy on 6/8/22.     Covid test scheduled: None needed, tested positive for COVID on 4/23/22    Arrival time: 10:30am    [place] [facility] [place]    Sedation type: MAC

## 2022-06-01 ENCOUNTER — TELEPHONE (OUTPATIENT)
Dept: PSYCHIATRY | Facility: CLINIC | Age: 67
End: 2022-06-01
Payer: MEDICARE

## 2022-06-01 NOTE — TELEPHONE ENCOUNTER
Prior Authorization Retail Medication Request    Medication/Dose: Arpiprazole 2MG Tablets  ICD code (if different than what is on RX):    Previously Tried and Failed:    Rationale:      Insurance Name:    Insurance ID:        Pharmacy Information (if different than what is on RX)  Name:    Phone:

## 2022-06-02 DIAGNOSIS — F33.1 MAJOR DEPRESSIVE DISORDER, RECURRENT EPISODE, MODERATE (H): ICD-10-CM

## 2022-06-02 RX ORDER — ARIPIPRAZOLE 2 MG/1
3 TABLET ORAL 2 TIMES DAILY
Qty: 90 TABLET | Refills: 2 | Status: SHIPPED | OUTPATIENT
Start: 2022-06-02 | End: 2022-08-04

## 2022-06-02 NOTE — TELEPHONE ENCOUNTER
Prior Authorization Approval    Authorization Effective Date: 5/3/2022  Authorization Expiration Date: 6/2/2023  Medication: Arpiprazole 2MG Tablets-APPROVED  Approved Dose/Quantity:   Reference #:     Insurance Company: [employer] - Phone [phone] Fax [phone]  Expected CoPay:       CoPay Card Available:      Foundation Assistance Needed:    Which Pharmacy is filling the prescription (Not needed for infusion/clinic administered): [id number] - [place], [place] - [facility] [facility]  Pharmacy Notified: Yes- patient will get a text when ready.  Patient Notified: No

## 2022-06-02 NOTE — CONFIDENTIAL NOTE
Last seen: 03/03/2022  RTC: 1 month   Cancel: none  No-show: none  Next appt: 08/04/2022    Incoming refill from patient via phone    Medication requested: Abilify 2 MG tablet   Directions: take 1.5 tablets (3 mg) by mouth 2 times daily   Qty: 90  Last refilled: 03/03/2022    Medication refill approved per refill protocol

## 2022-06-02 NOTE — CONFIDENTIAL NOTE
Call was placed to Elizabeth as there were discrepancies noted in how she was taking it and what was prescribed. Spoke with Elizabeth's  and confirmed she was taking Abilify 1.5 tablets (3 mg) once daily, until 2 days ago when they noticed the instructions said twice daily. Elizabeth has been taking 3 mg twice daily for about 3 days, and has seen some changes. Her  has noted she is more calm, and her temper s more settled. At  This time they are not noting any adverse effects. Provided education to call and update care team if any adverse effects are noted with this increase. Both Elizabeth and  verbalized understanding.

## 2022-06-03 ENCOUNTER — LAB (OUTPATIENT)
Dept: LAB | Facility: CLINIC | Age: 67
End: 2022-06-03
Payer: MEDICARE

## 2022-06-03 ENCOUNTER — TELEPHONE (OUTPATIENT)
Dept: TRANSPLANT | Facility: CLINIC | Age: 67
End: 2022-06-03

## 2022-06-03 DIAGNOSIS — Z94.0 DECEASED-DONOR KIDNEY TRANSPLANT: ICD-10-CM

## 2022-06-03 DIAGNOSIS — M81.0 AGE-RELATED OSTEOPOROSIS WITHOUT CURRENT PATHOLOGICAL FRACTURE: ICD-10-CM

## 2022-06-03 DIAGNOSIS — Z94.0 KIDNEY TRANSPLANTED: ICD-10-CM

## 2022-06-03 DIAGNOSIS — M81.0 SENILE OSTEOPOROSIS: ICD-10-CM

## 2022-06-03 DIAGNOSIS — Z92.29 PERSONAL HISTORY OF OTHER DRUG THERAPY: Primary | ICD-10-CM

## 2022-06-03 LAB
ANION GAP SERPL CALCULATED.3IONS-SCNC: 13 MMOL/L (ref 3–14)
BUN SERPL-MCNC: 26 MG/DL (ref 7–30)
CALCIUM SERPL-MCNC: 9.2 MG/DL (ref 8.5–10.1)
CHLORIDE BLD-SCNC: 106 MMOL/L (ref 94–109)
CO2 SERPL-SCNC: 22 MMOL/L (ref 20–32)
CREAT SERPL-MCNC: 1.6 MG/DL (ref 0.52–1.04)
CYCLOSPORINE BLD LC/MS/MS-MCNC: 116 UG/L (ref 50–400)
ERYTHROCYTE [DISTWIDTH] IN BLOOD BY AUTOMATED COUNT: 14.8 % (ref 10–15)
GFR SERPL CREATININE-BSD FRML MDRD: 36 ML/MIN/1.73M2
GLUCOSE BLD-MCNC: 203 MG/DL (ref 70–99)
HCT VFR BLD AUTO: 41.9 % (ref 35–47)
HGB BLD-MCNC: 13.2 G/DL (ref 11.7–15.7)
MCH RBC QN AUTO: 30.1 PG (ref 26.5–33)
MCHC RBC AUTO-ENTMCNC: 31.5 G/DL (ref 31.5–36.5)
MCV RBC AUTO: 95 FL (ref 78–100)
PLATELET # BLD AUTO: 163 10E3/UL (ref 150–450)
POTASSIUM BLD-SCNC: 4.7 MMOL/L (ref 3.4–5.3)
RBC # BLD AUTO: 4.39 10E6/UL (ref 3.8–5.2)
SODIUM SERPL-SCNC: 141 MMOL/L (ref 133–144)
TME LAST DOSE: NORMAL H
TME LAST DOSE: NORMAL H
WBC # BLD AUTO: 5.9 10E3/UL (ref 4–11)

## 2022-06-03 PROCEDURE — 36415 COLL VENOUS BLD VENIPUNCTURE: CPT | Performed by: PATHOLOGY

## 2022-06-03 PROCEDURE — 80048 BASIC METABOLIC PNL TOTAL CA: CPT | Performed by: PATHOLOGY

## 2022-06-03 PROCEDURE — 80158 DRUG ASSAY CYCLOSPORINE: CPT | Performed by: INTERNAL MEDICINE

## 2022-06-03 PROCEDURE — 85027 COMPLETE CBC AUTOMATED: CPT | Performed by: PATHOLOGY

## 2022-06-03 RX ORDER — METHYLPREDNISOLONE SODIUM SUCCINATE 125 MG/2ML
125 INJECTION, POWDER, LYOPHILIZED, FOR SOLUTION INTRAMUSCULAR; INTRAVENOUS
Status: CANCELLED
Start: 2022-06-03

## 2022-06-03 RX ORDER — DIPHENHYDRAMINE HYDROCHLORIDE 50 MG/ML
50 INJECTION INTRAMUSCULAR; INTRAVENOUS
Status: CANCELLED
Start: 2022-06-03

## 2022-06-03 RX ORDER — MEPERIDINE HYDROCHLORIDE 25 MG/ML
25 INJECTION INTRAMUSCULAR; INTRAVENOUS; SUBCUTANEOUS EVERY 30 MIN PRN
Status: CANCELLED | OUTPATIENT
Start: 2022-06-03

## 2022-06-03 RX ORDER — NALOXONE HYDROCHLORIDE 0.4 MG/ML
0.2 INJECTION, SOLUTION INTRAMUSCULAR; INTRAVENOUS; SUBCUTANEOUS
Status: CANCELLED | OUTPATIENT
Start: 2022-06-03

## 2022-06-03 RX ORDER — ALBUTEROL SULFATE 0.83 MG/ML
2.5 SOLUTION RESPIRATORY (INHALATION)
Status: CANCELLED | OUTPATIENT
Start: 2022-06-03

## 2022-06-03 RX ORDER — ALBUTEROL SULFATE 90 UG/1
1-2 AEROSOL, METERED RESPIRATORY (INHALATION)
Status: CANCELLED
Start: 2022-06-03

## 2022-06-03 RX ORDER — EPINEPHRINE 1 MG/ML
0.3 INJECTION, SOLUTION, CONCENTRATE INTRAVENOUS EVERY 5 MIN PRN
Status: CANCELLED | OUTPATIENT
Start: 2022-06-03

## 2022-06-03 RX ORDER — HEPARIN SODIUM (PORCINE) LOCK FLUSH IV SOLN 100 UNIT/ML 100 UNIT/ML
5 SOLUTION INTRAVENOUS
Status: CANCELLED | OUTPATIENT
Start: 2022-06-03

## 2022-06-03 RX ORDER — HEPARIN SODIUM,PORCINE 10 UNIT/ML
5 VIAL (ML) INTRAVENOUS
Status: CANCELLED | OUTPATIENT
Start: 2022-06-03

## 2022-06-03 NOTE — TELEPHONE ENCOUNTER
ISSUE:   elevated creatinine: 1.6 on 6/3/2022, baseline: 0.9-1  , above baseline     PLAN:  ----- Message from Francisco Jordan MD sent at 6/3/2022 10:26 AM CDT -----  CORA, can you arrange for fluids (1L NS) and see how she is doing please? Any more diarrhea, dysuria? I see her on 6/14    OUTCOME:  Denies Diarrhea, reports adequate UOP.  States she is feeling well.  Systolic BP 120s    Apt for IV fluids tomorrow 6/4 at 1300 patient agreeable    Orders placed.    Marivel Marcelo RN   Transplant Coordinator  [phone]

## 2022-06-04 ENCOUNTER — INFUSION THERAPY VISIT (OUTPATIENT)
Dept: INFUSION THERAPY | Facility: CLINIC | Age: 67
End: 2022-06-04
Attending: INTERNAL MEDICINE
Payer: MEDICARE

## 2022-06-04 VITALS
OXYGEN SATURATION: 99 % | HEART RATE: 57 BPM | SYSTOLIC BLOOD PRESSURE: 119 MMHG | RESPIRATION RATE: 18 BRPM | DIASTOLIC BLOOD PRESSURE: 76 MMHG | TEMPERATURE: 98.2 F

## 2022-06-04 DIAGNOSIS — M81.0 SENILE OSTEOPOROSIS: Primary | ICD-10-CM

## 2022-06-04 DIAGNOSIS — M81.0 AGE-RELATED OSTEOPOROSIS WITHOUT CURRENT PATHOLOGICAL FRACTURE: ICD-10-CM

## 2022-06-04 DIAGNOSIS — Z94.0 DECEASED-DONOR KIDNEY TRANSPLANT: ICD-10-CM

## 2022-06-04 DIAGNOSIS — Z92.29 PERSONAL HISTORY OF OTHER DRUG THERAPY: ICD-10-CM

## 2022-06-04 PROCEDURE — 258N000003 HC RX IP 258 OP 636: Performed by: INTERNAL MEDICINE

## 2022-06-04 PROCEDURE — 96360 HYDRATION IV INFUSION INIT: CPT

## 2022-06-04 RX ORDER — METHYLPREDNISOLONE SODIUM SUCCINATE 125 MG/2ML
125 INJECTION, POWDER, LYOPHILIZED, FOR SOLUTION INTRAMUSCULAR; INTRAVENOUS
Status: CANCELLED
Start: 2022-06-04

## 2022-06-04 RX ORDER — NALOXONE HYDROCHLORIDE 0.4 MG/ML
0.2 INJECTION, SOLUTION INTRAMUSCULAR; INTRAVENOUS; SUBCUTANEOUS
Status: CANCELLED | OUTPATIENT
Start: 2022-06-04

## 2022-06-04 RX ORDER — DIPHENHYDRAMINE HYDROCHLORIDE 50 MG/ML
50 INJECTION INTRAMUSCULAR; INTRAVENOUS
Status: CANCELLED
Start: 2022-06-04

## 2022-06-04 RX ORDER — ALBUTEROL SULFATE 90 UG/1
1-2 AEROSOL, METERED RESPIRATORY (INHALATION)
Status: CANCELLED
Start: 2022-06-04

## 2022-06-04 RX ORDER — EPINEPHRINE 1 MG/ML
0.3 INJECTION, SOLUTION INTRAMUSCULAR; SUBCUTANEOUS EVERY 5 MIN PRN
Status: CANCELLED | OUTPATIENT
Start: 2022-06-04

## 2022-06-04 RX ORDER — HEPARIN SODIUM (PORCINE) LOCK FLUSH IV SOLN 100 UNIT/ML 100 UNIT/ML
5 SOLUTION INTRAVENOUS
Status: CANCELLED | OUTPATIENT
Start: 2022-06-04

## 2022-06-04 RX ORDER — HEPARIN SODIUM,PORCINE 10 UNIT/ML
5 VIAL (ML) INTRAVENOUS
Status: CANCELLED | OUTPATIENT
Start: 2022-06-04

## 2022-06-04 RX ORDER — ALBUTEROL SULFATE 0.83 MG/ML
2.5 SOLUTION RESPIRATORY (INHALATION)
Status: CANCELLED | OUTPATIENT
Start: 2022-06-04

## 2022-06-04 RX ORDER — MEPERIDINE HYDROCHLORIDE 25 MG/ML
25 INJECTION INTRAMUSCULAR; INTRAVENOUS; SUBCUTANEOUS EVERY 30 MIN PRN
Status: CANCELLED | OUTPATIENT
Start: 2022-06-04

## 2022-06-04 RX ADMIN — SODIUM CHLORIDE 1000 ML: 9 INJECTION, SOLUTION INTRAVENOUS at 13:50

## 2022-06-04 ASSESSMENT — PAIN SCALES - GENERAL: PAINLEVEL: NO PAIN (0)

## 2022-06-04 NOTE — PATIENT INSTRUCTIONS
Dear Elizabeth Palma    Thank you for choosing [facility] ([facility]) for your infusion.  The following information is a summary of our appointment as well as important reminders.      We look forward in seeing you on your next appointment here at [facility] ([facility]).  Please don t hesitate to call us at [phone] to reschedule any of your appointments or to speak with one of the [facility] registered nurses.  It was a pleasure taking care of you today.    Sincerely,    [facility]  [facility]  [address] [place] [address]  [id number]  Phone:  [phone]

## 2022-06-04 NOTE — LETTER
6/4/2022         RE: Elizabeth Plama  [address]  [address]  [facility] [place] [facility] [id number]        Dear Colleague,    Thank you for referring your patient, Elizabeth Palma, to the [facility]. Please see a copy of my visit note below.    Infusion Nursing Note:  Elizabeth Palma presents today for IV fluids.    Patient seen by provider today: No   present during visit today: Not Applicable.    Note: Elizabeth states that she has had fatigue since her COVID infection in April. She feels like she is drinking well but her labs are not reflecting this.      Intravenous Access:  Peripheral IV placed.    Treatment Conditions:  Not Applicable.    Post Infusion Assessment:  Patient tolerated infusion without incident.  Site patent and intact, free from redness, edema or discomfort.  Access discontinued per protocol.     Discharge Plan:   Copy of AVS reviewed with patient and/or family.  Patient will return as needed for next appointment.  Patient discharged in stable condition accompanied by: .        Administrations This Visit     0.9% sodium chloride BOLUS     Admin Date  06/04/2022 Action  New Bag Dose  1,000 mL Route  Intravenous Administered By  Francie Payton, MADHU Payton, MADHU                        Again, thank you for allowing me to participate in the care of your patient.        Sincerely,        [facility]

## 2022-06-04 NOTE — PROGRESS NOTES
Infusion Nursing Note:  Elizabeth Palma presents today for IV fluids.    Patient seen by provider today: No   present during visit today: Not Applicable.    Note: Elizabeth states that she has had fatigue since her COVID infection in April. She feels like she is drinking well but her labs are not reflecting this.      Intravenous Access:  Peripheral IV placed.    Treatment Conditions:  Not Applicable.    Post Infusion Assessment:  Patient tolerated infusion without incident.  Site patent and intact, free from redness, edema or discomfort.  Access discontinued per protocol.     Discharge Plan:   Copy of AVS reviewed with patient and/or family.  Patient will return as needed for next appointment.  Patient discharged in stable condition accompanied by: .        Administrations This Visit     0.9% sodium chloride BOLUS     Admin Date  06/04/2022 Action  New Bag Dose  1,000 mL Route  Intravenous Administered By  Francie Payton RN Alyssa Marie Sakhitab-Kerestes, RN

## 2022-06-06 ENCOUNTER — MEDICAL CORRESPONDENCE (OUTPATIENT)
Dept: HEALTH INFORMATION MANAGEMENT | Facility: CLINIC | Age: 67
End: 2022-06-06

## 2022-06-06 ENCOUNTER — OFFICE VISIT (OUTPATIENT)
Dept: INTERNAL MEDICINE | Facility: CLINIC | Age: 67
End: 2022-06-06
Payer: MEDICARE

## 2022-06-06 ENCOUNTER — OFFICE VISIT (OUTPATIENT)
Dept: OPHTHALMOLOGY | Facility: CLINIC | Age: 67
End: 2022-06-06
Attending: OPHTHALMOLOGY
Payer: MEDICARE

## 2022-06-06 VITALS
SYSTOLIC BLOOD PRESSURE: 112 MMHG | DIASTOLIC BLOOD PRESSURE: 72 MMHG | HEART RATE: 53 BPM | RESPIRATION RATE: 16 BRPM | WEIGHT: 194.5 LBS | HEIGHT: 61 IN | BODY MASS INDEX: 36.72 KG/M2 | OXYGEN SATURATION: 96 %

## 2022-06-06 DIAGNOSIS — G25.0 ESSENTIAL TREMOR: ICD-10-CM

## 2022-06-06 DIAGNOSIS — U07.1 PNEUMONIA DUE TO COVID-19 VIRUS: ICD-10-CM

## 2022-06-06 DIAGNOSIS — J12.82 PNEUMONIA DUE TO COVID-19 VIRUS: ICD-10-CM

## 2022-06-06 DIAGNOSIS — Z53.9 DIAGNOSIS NOT YET DEFINED: Primary | ICD-10-CM

## 2022-06-06 DIAGNOSIS — M22.2X1 PATELLOFEMORAL DISORDER, RIGHT: Primary | ICD-10-CM

## 2022-06-06 DIAGNOSIS — H50.05 ALTERNATING ESOTROPIA: Primary | ICD-10-CM

## 2022-06-06 PROCEDURE — G0180 MD CERTIFICATION HHA PATIENT: HCPCS | Performed by: INTERNAL MEDICINE

## 2022-06-06 PROCEDURE — 99214 OFFICE O/P EST MOD 30 MIN: CPT | Mod: CS | Performed by: INTERNAL MEDICINE

## 2022-06-06 PROCEDURE — 999N000103 HC STATISTIC NO CHARGE FACILITY FEE: Performed by: TECHNICIAN/TECHNOLOGIST

## 2022-06-06 PROCEDURE — V2718 FRESNELL PRISM PRESS-ON LENS: HCPCS | Performed by: TECHNICIAN/TECHNOLOGIST

## 2022-06-06 PROCEDURE — 99207 PR NO CHARGE LOS: CPT

## 2022-06-06 ASSESSMENT — REFRACTION_FINALRX
OS_HPRISM: 3.0
OD_HBASE: OUT
OS_HBASE: OUT
OD_HPRISM: 3.0

## 2022-06-06 ASSESSMENT — ANXIETY QUESTIONNAIRES
7. FEELING AFRAID AS IF SOMETHING AWFUL MIGHT HAPPEN: NOT AT ALL
2. NOT BEING ABLE TO STOP OR CONTROL WORRYING: MORE THAN HALF THE DAYS
GAD7 TOTAL SCORE: 11
6. BECOMING EASILY ANNOYED OR IRRITABLE: MORE THAN HALF THE DAYS
5. BEING SO RESTLESS THAT IT IS HARD TO SIT STILL: SEVERAL DAYS
3. WORRYING TOO MUCH ABOUT DIFFERENT THINGS: MORE THAN HALF THE DAYS
1. FEELING NERVOUS, ANXIOUS, OR ON EDGE: MORE THAN HALF THE DAYS
GAD7 TOTAL SCORE: 11

## 2022-06-06 ASSESSMENT — REFRACTION_WEARINGRX
OD_SPHERE: -1.50
OS_AXIS: 110
OD_AXIS: 045
OS_CYLINDER: +2.00
OD_ADD: +2.75
OS_ADD: +2.75
OS_SPHERE: -1.25
OD_CYLINDER: +0.75

## 2022-06-06 ASSESSMENT — PATIENT HEALTH QUESTIONNAIRE - PHQ9
SUM OF ALL RESPONSES TO PHQ QUESTIONS 1-9: 10
5. POOR APPETITE OR OVEREATING: MORE THAN HALF THE DAYS

## 2022-06-06 NOTE — COMMUNITY RESOURCES LIST (ENGLISH)
06/06/2022   [facility]  Fallon Tammi  For questions about this resource list or additional care needs, please contact your primary care clinic or care manager.  Phone: [phone]   Email: N/A   Address: [address] [place] [address]   Hours: N/A        Hotlines and Helplines       Hotline - Crisis help  1  Stephanie [facility] Distance: 1.83 miles      COVID-19 Status: Phone/Virtual   [address] [place] [address]  Language: English, Ecuadorean, Mandarin, Trinidadian, Swahili  Hours: Mon - Sun Open 24 Hours   Phone: (624) 798-2503 Email: info@[employer].org Website: http://www.[employer].org/     2  [facility] Office - Community Outreach for Psychiatric Emergencies (COPE) Distance: 2.01 miles      COVID-19 Status: Phone/Virtual   [address] [place] [address]  Language: English, Samoan, Trinidadian  Hours: Mon - Sun Open 24 Hours   Phone: (139) 949-9578 Email: yoandy@[email or web address]. Website: http://www.[place]/[facility]/health-medical/adult-mental-health-services          Mental Health       Individual counseling  3  [facility] Distance: 1.42 miles      COVID-19 Status: Regular Operations, COVID-19 Status: Phone/Virtual   [address] [place] [address]  Language: English  Hours: Mon - Thu 8:00 AM - 9:00 PM , Fri 8:00 AM - 4:00 PM  Fees: Insurance, Self Pay   Phone: (533) 596-4355 Website: https://[employer].net     4  [facility] Distance: 1.61 miles      COVID-19 Status: Phone/Virtual   [address] [place] [address]  Language: English  Hours: Mon - Fri 8:00 AM - 8:00 PM  Fees: Insurance, Self Pay   Phone: (236) 833-4089 Email: acp_stlp@[employer] Website: http://www.[employer]     Mental health crisis care  5  [facility] - Acute Psychiatry Services Distance: 7.52 miles      COVID-19 Status: Regular  Operations   [address] [place] [address]  Language: English  Hours: Mon - Sun Open 24 Hours  Fees: Insurance, Self Pay, Sliding Fee   Phone: (546) 500-9357 Website: [email or web address]/specialty/psychiatry/acute-psychiatry-services/     6  Community Outreach for Psychiatric Emergencies (COPE) Distance: 7.56 miles      COVID-19 Status: Regular Operations, COVID-19 Status: Phone/Virtual   [address] [place] [address]  Language: English, Singaporean, Romansh  Hours: Mon - Sun Open 24 Hours  Fees: Free   Phone: (398) 871-7844 Email: [email or web address]. Website: http://www.[employer]./residents/emergencies/mental-health-emergencies     Mental health support group  7  [facility] [place] [facility] Distance: 1.42 miles      COVID-19 Status: Regular Operations, COVID-19 Status: Phone/Virtual   [address] [facility] [address] [place] [address]  Language: English  Hours: Mon - Thu 8:00 AM - 9:00 PM , Fri 8:00 AM - 4:00 PM  Fees: Insurance, Self Pay   Phone: (890) 315-9217 Website: https://[employer].net     8  [facility] - Autism Spectrum Disorder Support Group Distance: 2.42 miles      COVID-19 Status: Service Unavailable   [address] [facility] [address] [place] [address]  Language: English  Hours: Mon 8:00 AM - 3:00 PM , Tue - Wed 12:00 PM - 5:00 PM , Thu 8:00 AM - 3:00 PM  Fees: Insurance, Self Pay   Phone: (822) 827-7349 Email: Kathy@[employer] Website: http://www.[employer]          Important Numbers & Websites       Emergency Services   911  City Services   311  Poison Control   (964) 767-6285  Suicide Prevention Lifeline   (229) 864-8795 (TALK)  Child Abuse Hotline   (334) 847-5334 (4-A-Child)  Sexual Assault Hotline   (426) 356-1374 (HOPE)  National Runaway Safeline   (207) 681-6758 (RUNAWAY)  All-Options Talkline   (513) 228-5006  Substance Abuse Referral   (631) 603-5547 (HELP)

## 2022-06-06 NOTE — PROGRESS NOTES
ASSESSMENT/PLAN:  Right patella dislocation or hypermobility-I suspect that right patella is tracking laterally and is dislocating or getting stuck.  She is quite a bit of movement on exam especially laterally of the right patella.  I could not replicate the dislocation symptom that she describes.  I suggested referral to sports medicine for plan to help strengthen the patella.    Essential tremor - she is doing better but her heart rate is in the low 50s and she has orthostatic hypotension symptoms.  The lightheadedness is fleeting and is only when she goes from a sitting to a standing position.  Her  is present and helps her with this change in position.    Covid pneumonia - clearing up with no residual lung function other than occasional cough.  She does have some memory difficulties which she points to as the onset of COVID.  I mentioned that this is a side effect of having COVID tends to get better with time but only time will tell.    0 minutes precharting, 25 minutes for the visit and 6 min on charting and updating records for a total of 32 minutes spent on the date of the encounter doing chart review, history and exam, documentation and further activities as noted above    Horacio Sheridan MD, FACP      Chief complaint:  Elizabeth Palma is a 66 year old female presents for   Chief Complaint   Patient presents with     Joint Pain     Pt comes into clinic to discuss joint pain        SUBJECTIVE:  She was in the hospital x 8 days with pneumonia after COVID - she just tested positive for a test - and then progressed to full pneumonia with high fever. She has some cough still for which she was taking pneumonia.. She has some memory issues - short term memory loss - after Covid. She was playing [employer] virtually and just left the table without telling the friends - she just left the table and did not think to tell them. This all started after Covid. She was using a walker and is doing PT and OT and is not needing  "it anymore.      She says that her left knee will \"go out of joint\" and then has to rotate the knee around in a Eyak to get it to back in. It will click and \"move\" (not sure what move) when it goes out and then click back into place. It is painful when it is out of position and then not painful when it goes back in. This will happen when moving from sitting to standing. This will happen 4-5 times per week.     She fell on her left knee and had that checked out and told she had a knee strain. She was given exercise, ice packs. She was seen at [facility] on 22 for this.    Medications and allergies were reviewed by me today.     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     Priority: High     -donor kidney transplant 2014     Priority: High     Major depressive disorder, recurrent episode, moderate (H) 11/15/2012     Priority: High     OP (osteoporosis) T score -3.8 2009     Priority: High     2007 T-score -3.7       Altered mental status 2022     Priority: Medium     Generalized muscle weakness 2022     Priority: Medium     History of kidney transplant 2022     Priority: Medium     Infection due to 2019 novel coronavirus 2022     Priority: Medium     Chronic kidney disease, stage V (H) 2022     Priority: Medium     Bacteremia 2021     Priority: Medium     PCKD, S/P Renal Transplant 2014 ([facility]) 2021     Priority: Medium     Intractable back pain 2021     Priority: Medium     UTI 2021     Priority: Medium     Chronic kidney disease, stage 3 (H) 2021     Priority: Medium     Morbid obesity (H) 2021     Priority: Medium     Anemia, iron deficiency 01/10/2020     Priority: Medium     " Median sensory neuropathy, left 03/11/2019     Priority: Medium     Nerve conduction study down at [facility] in [place] on 3/7/19 shows: mild left median neuropathy at wrist, left median motor distal latency is normal, the left ulnar motor conduction velocity is abnormally slowed       Personal history of other drug therapy 11/13/2018     Priority: Medium     Marital conflict 08/08/2018     Priority: Medium     Suicidal ideation 08/07/2018     Priority: Medium     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     Priority: Medium     Age-related osteoporosis without current pathological fracture 08/10/2016     Priority: Medium     Right knee pain 02/08/2016     Priority: Medium     Plantar fasciitis, bilateral 01/25/2016     Priority: Medium     Posttraumatic stress disorder 11/24/2015     Priority: Medium     Nightmares associated with chronic post-traumatic stress disorder 10/27/2015     Priority: Medium     Pain in joint involving ankle and foot 07/13/2015     Priority: Medium     Senile osteoporosis 05/29/2015     Priority: Medium     Hyperparathyroidism (H) 02/26/2015     Priority: Medium     Problem list name updated by automated process. Provider to review       Hyperparathyroidism, secondary (H) 02/25/2015     Priority: Medium     Secondary hyperparathyroidism (H) 02/25/2015     Priority: Medium     Immunosuppressed status (H) 03/20/2014     Priority: Medium     Pain in joint, forearm -- L unhealed Fx 05/21/2013     Priority: Medium     CMC DJD(carpometacarpal degenerative joint disease), localized primary 03/05/2013     Priority: Medium     Generalized anxiety disorder 11/15/2012     Priority: Medium     Premature menopause age 37 07/10/2012     Priority: Medium     OCP (vaginal bldg)-->HT which she stopped 2 mo later documented at Jan 12, 2007 visit (age 51).       Sensory loss 10/17/2011     Priority: Medium     Bottom of feet; uncertain if there is a neuropathy per notes.         "Hypertension 10/15/2011     Priority: Medium     Hyperlipidemia 10/15/2011     Priority: Medium     Abnormal MRI, cervical spine 10/15/2011     Priority: Medium     4/2011; mild changes noted. Study done for left arm symptoms  Impression:   1. Mild multilevel degenerative disc disease with no significant canal  or neural stenosis seen. motion artifact on the STIR images in these  are not interpretable. The remaining images were interpreted       Autosomal dominant polycystic kidney disease 07/05/2011     Priority: Medium     Overview:   Overview:   (Problem list name updated by automated process. Provider to review and confirm.)         PHYSICAL EXAM:  /72 (BP Location: Left arm, Patient Position: Sitting, Cuff Size: Adult Regular)   Pulse 53   Resp 16   Ht 1.549 m (5' 1\")   Wt 88.2 kg (194 lb 8 oz)   LMP  (LMP Unknown)   SpO2 96%   BMI 36.75 kg/m    Constitutional: no distress  Cardiovascular: regular rate and rhythm, normal S1 and S2, no murmurs, rubs or gallops  Respiratory: clear to auscultation, no wheezes or crackles, normal breath sounds   Musculoskeletal: right knee patella will shift laterally and seems fairly lax and a bit hypermobile.  When she flexes the leg it does move more medially but not completely in the midline.  The left patella does stay a bit more midline and has less hypermobility.  Neurological: high frequency head shake and hand movement although the head movement is more pronounced than and movement      Xray Tria of the knees 5/24/22:    Radiology review x-rays, 3-views of the left knee and 3-views of the right are read as: Left knee no acute bony abnormalities. Moderate lateral compartment joint space narrowing. Patellofemoral and medial compartment joint spaces are intact. Small knee effusion. Osteopenia and vascular calcifications. Right knee, no acute bony abnormalities. Moderate lateral compartment joint space narrowing, and medial and patellofemoral compartment joint " spaces are preserved. No significant knee effusion. Vascular calcifications and osteopenia.

## 2022-06-06 NOTE — NURSING NOTE
Elizabeth Palma is a 66 year old female patient that presents today in clinic for the following:    Chief Complaint   Patient presents with     Joint Pain     Pt comes into clinic to discuss joint pain     The patient's allergies and medications were reviewed as noted. A set of vitals were recorded as noted without incident. The patient does not have any other questions for the provider.    FABY Rosales at 9:25 AM on 6/6/2022

## 2022-06-06 NOTE — PROGRESS NOTES
Assessment & Plan       Elizabeth Palma is a 66 year old female with the following diagnoses:   1. Alternating esotropia         Chief Complaint(s) and History of Present Illness(es)     Follow Up               Comments     Got glasses rx in 12/2021 without prism. Constant horizontal diplopia. Requesting prisms be added be back to glasses rx.     RAMONA Julian 6/6/2022 11:23 AM              Dispensed updated glasses prescription with prisms.  Applied fresnel prism 6 base out LEFT lens in the interim until patient gets ground in prism glasses.    Patient disposition:   Return if symptoms worsen or fail to improve.       RAMONA Julian 6/6/2022 11:25 AM

## 2022-06-07 ENCOUNTER — ANESTHESIA EVENT (OUTPATIENT)
Dept: SURGERY | Facility: AMBULATORY SURGERY CENTER | Age: 67
End: 2022-06-07
Payer: MEDICARE

## 2022-06-07 ENCOUNTER — TELEPHONE (OUTPATIENT)
Dept: GASTROENTEROLOGY | Facility: CLINIC | Age: 67
End: 2022-06-07
Payer: MEDICARE

## 2022-06-07 RX ORDER — BISACODYL 5 MG/1
TABLET, DELAYED RELEASE ORAL
Qty: 4 TABLET | Refills: 0 | Status: SHIPPED | OUTPATIENT
Start: 2022-06-07 | End: 2022-06-14

## 2022-06-07 NOTE — TELEPHONE ENCOUNTER
Patient calling stating they did not receive bisacodyl tablets. Procedure is tomorrow.     Informed patient that writer will resend bisacodyl tablets to pharmacy but that they could also pick them up over the counter. No script is needed for these tablets.     Reviewed Golytely instructions. Patient has been holding Apixaban (Eliquis).     Patient had no further questions or concerns at this time.     Akanksha Mendosa RN

## 2022-06-07 NOTE — TELEPHONE ENCOUNTER
[facility] Call Center    Reason for Call: Other: Patient's  calling with prep-related question for tomorrow's endo procedure     Action Taken: Patient transferred to: Tammy Mendosa RN    Travel Screening: Not Applicable

## 2022-06-08 ENCOUNTER — ANESTHESIA (OUTPATIENT)
Dept: SURGERY | Facility: AMBULATORY SURGERY CENTER | Age: 67
End: 2022-06-08
Payer: MEDICARE

## 2022-06-08 ENCOUNTER — HOSPITAL ENCOUNTER (OUTPATIENT)
Facility: AMBULATORY SURGERY CENTER | Age: 67
Discharge: HOME | End: 2022-06-08
Attending: INTERNAL MEDICINE
Payer: MEDICARE

## 2022-06-08 VITALS
BODY MASS INDEX: 35.87 KG/M2 | SYSTOLIC BLOOD PRESSURE: 135 MMHG | DIASTOLIC BLOOD PRESSURE: 77 MMHG | HEIGHT: 61 IN | RESPIRATION RATE: 16 BRPM | TEMPERATURE: 98.1 F | WEIGHT: 190 LBS | HEART RATE: 49 BPM | OXYGEN SATURATION: 98 %

## 2022-06-08 VITALS — HEART RATE: 51 BPM

## 2022-06-08 DIAGNOSIS — D12.6 ADENOMATOUS POLYP OF COLON, UNSPECIFIED PART OF COLON: Primary | ICD-10-CM

## 2022-06-08 LAB
COLONOSCOPY: NORMAL
GLUCOSE BLDC GLUCOMTR-MCNC: 146 MG/DL (ref 70–99)

## 2022-06-08 PROCEDURE — 88305 TISSUE EXAM BY PATHOLOGIST: CPT | Mod: TC | Performed by: INTERNAL MEDICINE

## 2022-06-08 PROCEDURE — 82962 GLUCOSE BLOOD TEST: CPT | Performed by: PATHOLOGY

## 2022-06-08 PROCEDURE — 45380 COLONOSCOPY AND BIOPSY: CPT | Mod: PT

## 2022-06-08 RX ORDER — NALOXONE HYDROCHLORIDE 0.4 MG/ML
0.2 INJECTION, SOLUTION INTRAMUSCULAR; INTRAVENOUS; SUBCUTANEOUS
Status: CANCELLED | OUTPATIENT
Start: 2022-06-08

## 2022-06-08 RX ORDER — PROCHLORPERAZINE MALEATE 10 MG
10 TABLET ORAL EVERY 6 HOURS PRN
Status: CANCELLED | OUTPATIENT
Start: 2022-06-08

## 2022-06-08 RX ORDER — ONDANSETRON 2 MG/ML
4 INJECTION INTRAMUSCULAR; INTRAVENOUS
Status: DISCONTINUED | OUTPATIENT
Start: 2022-06-08 | End: 2022-06-09 | Stop reason: HOSPADM

## 2022-06-08 RX ORDER — LIDOCAINE HYDROCHLORIDE 20 MG/ML
INJECTION, SOLUTION INFILTRATION; PERINEURAL PRN
Status: DISCONTINUED | OUTPATIENT
Start: 2022-06-08 | End: 2022-06-08

## 2022-06-08 RX ORDER — NALOXONE HYDROCHLORIDE 0.4 MG/ML
0.4 INJECTION, SOLUTION INTRAMUSCULAR; INTRAVENOUS; SUBCUTANEOUS
Status: CANCELLED | OUTPATIENT
Start: 2022-06-08

## 2022-06-08 RX ORDER — FLUMAZENIL 0.1 MG/ML
0.2 INJECTION, SOLUTION INTRAVENOUS
Status: CANCELLED | OUTPATIENT
Start: 2022-06-08 | End: 2022-06-08

## 2022-06-08 RX ORDER — ONDANSETRON 4 MG/1
4 TABLET, ORALLY DISINTEGRATING ORAL EVERY 6 HOURS PRN
Status: CANCELLED | OUTPATIENT
Start: 2022-06-08

## 2022-06-08 RX ORDER — SODIUM CHLORIDE, SODIUM LACTATE, POTASSIUM CHLORIDE, CALCIUM CHLORIDE 600; 310; 30; 20 MG/100ML; MG/100ML; MG/100ML; MG/100ML
INJECTION, SOLUTION INTRAVENOUS CONTINUOUS
Status: DISCONTINUED | OUTPATIENT
Start: 2022-06-08 | End: 2022-06-09 | Stop reason: HOSPADM

## 2022-06-08 RX ORDER — ONDANSETRON 2 MG/ML
4 INJECTION INTRAMUSCULAR; INTRAVENOUS EVERY 6 HOURS PRN
Status: CANCELLED | OUTPATIENT
Start: 2022-06-08

## 2022-06-08 RX ORDER — PROPOFOL 10 MG/ML
INJECTION, EMULSION INTRAVENOUS CONTINUOUS PRN
Status: DISCONTINUED | OUTPATIENT
Start: 2022-06-08 | End: 2022-06-08

## 2022-06-08 RX ORDER — PROPOFOL 10 MG/ML
INJECTION, EMULSION INTRAVENOUS PRN
Status: DISCONTINUED | OUTPATIENT
Start: 2022-06-08 | End: 2022-06-08

## 2022-06-08 RX ORDER — LIDOCAINE 40 MG/G
CREAM TOPICAL
Status: DISCONTINUED | OUTPATIENT
Start: 2022-06-08 | End: 2022-06-09 | Stop reason: HOSPADM

## 2022-06-08 RX ADMIN — PROPOFOL 150 MCG/KG/MIN: 10 INJECTION, EMULSION INTRAVENOUS at 11:27

## 2022-06-08 RX ADMIN — LIDOCAINE HYDROCHLORIDE 20 MG: 20 INJECTION, SOLUTION INFILTRATION; PERINEURAL at 11:26

## 2022-06-08 RX ADMIN — LIDOCAINE HYDROCHLORIDE 20 MG: 20 INJECTION, SOLUTION INFILTRATION; PERINEURAL at 11:27

## 2022-06-08 RX ADMIN — PROPOFOL 20 MG: 10 INJECTION, EMULSION INTRAVENOUS at 11:27

## 2022-06-08 RX ADMIN — LIDOCAINE HYDROCHLORIDE 60 MG: 20 INJECTION, SOLUTION INFILTRATION; PERINEURAL at 11:25

## 2022-06-08 RX ADMIN — PROPOFOL 100 MG: 10 INJECTION, EMULSION INTRAVENOUS at 11:32

## 2022-06-08 NOTE — ANESTHESIA POSTPROCEDURE EVALUATION
Patient: Elizabeth Palma    Procedure: Procedure(s):  COLONOSCOPY, WITH POLYPECTOMY       Anesthesia Type:  MAC    Note:  Disposition: Outpatient   Postop Pain Control: Uneventful            Sign Out: Well controlled pain   PONV:    Neuro/Psych: Uneventful            Sign Out: Acceptable/Baseline neuro status   Airway/Respiratory: Uneventful            Sign Out: Acceptable/Baseline resp. status   CV/Hemodynamics: Uneventful            Sign Out: Acceptable CV status; No obvious hypovolemia; No obvious fluid overload   Other NRE:    DID A NON-ROUTINE EVENT OCCUR?            Last vitals:  Vitals Value Taken Time   /77 06/08/22 1205   Temp 36.7  C (98.1  F) 06/08/22 1205   Pulse 49 06/08/22 1205   Resp 16 06/08/22 1205   SpO2 98 % 06/08/22 1205       Electronically Signed By: Dex Donohue MD  June 8, 2022  3:45 PM

## 2022-06-08 NOTE — H&P
Elizabeth Palma  [id number]  female  66 year old      Reason for procedure/surgery: Colon cancer screening/polyp surveillance    Patient Active Problem List   Diagnosis     Hypertension     Hyperlipidemia     Abnormal MRI, cervical spine     Sensory loss     Premature menopause age 37     OP (osteoporosis) T score -3.8     Major depressive disorder, recurrent episode, moderate (H)     Generalized anxiety disorder     CMC DJD(carpometacarpal degenerative joint disease), localized primary     Pain in joint, forearm -- L unhealed Fx     -donor kidney transplant     Immunosuppressed status (H)     Hyperparathyroidism, secondary (H)     Hyperparathyroidism (H)     Senile osteoporosis     Pain in joint involving ankle and foot     Nightmares associated with chronic post-traumatic stress disorder     DM type 2 with Neuropathy, Hgb A1C 7.3 on 21     Posttraumatic stress disorder     Plantar fasciitis, bilateral     Right knee pain     Age-related osteoporosis without current pathological fracture     Narcissistic personality disorder (H)     Personal history of other drug therapy     Median sensory neuropathy, left     Marital conflict     Suicidal ideation     Autosomal dominant polycystic kidney disease     Secondary hyperparathyroidism (H)     Anemia, iron deficiency     Morbid obesity (H)     Chronic kidney disease, stage 3 (H)     PCKD, S/P Renal Transplant  ([facility])     Intractable back pain     UTI     Bacteremia     Chronic kidney disease, stage V (H)     Altered mental status     Generalized muscle weakness     History of kidney transplant     Infection due to 2019 novel coronavirus       Past Surgical History:    Past Surgical History:   Procedure Laterality Date     ABDOMEN SURGERY       ANKLE SURGERY       C/SECTION, LOW TRANSVERSE      x 2     CHOLECYSTECTOMY       COLONOSCOPY       ESOPHAGOSCOPY, GASTROSCOPY, DUODENOSCOPY (EGD), COMBINED N/A 2015    Procedure: COMBINED ESOPHAGOSCOPY,  GASTROSCOPY, DUODENOSCOPY (EGD);  Surgeon: Sky Davey MD;  Location:  GI     ESOPHAGOSCOPY, GASTROSCOPY, DUODENOSCOPY (EGD), COMBINED N/A 2015    Procedure: COMBINED ESOPHAGOSCOPY, GASTROSCOPY, DUODENOSCOPY (EGD), BIOPSY SINGLE OR MULTIPLE;  Surgeon: Sky Davey MD;  Location:  GI     EYE SURGERY       LAPAROSCOPY, SURGICAL; REPAIR INCISIONAL OR VENTRAL HERNIA       LASER SURGERY OF EYE Left 10/01/2020    sever vitreous strands     ORTHOPEDIC SURGERY       HERMINIA EN Y BOWEL  1990     WRIST SURGERY       [facility] TRANSPLANTATION OF KIDNEY  2014       Past Medical History:   Past Medical History:   Diagnosis Date     Abnormal MRI, cervical spine 10/15/2011    2011; mild changes noted. Study done for left arm symptoms Impression:  1. Mild multilevel degenerative disc disease with no significant canal or neural stenosis seen. motion artifact on the STIR images in these are not interpretable. The remaining images were interpreted      Autosomal dominant polycystic kidney disease 2011     (Problem list name updated by automated process. Provider to review and confirm.)     CMC DJD(carpometacarpal degenerative joint disease), localized primary 2013     -donor kidney transplant 2014     Gastroesophageal reflux disease      Generalized anxiety disorder 11/15/2012     Glaucoma      Hyperlipidemia 10/15/2011     Hyperparathyroidism, secondary (H) 2015     Hypertension     resolved     Immunosuppressed status (H) 2014     Major depressive disorder, recurrent episode, moderate (H) 11/15/2012     Obesity (BMI 30-39.9)      OP (osteoporosis) T score -3.8 2009 T-score -3.7      LION (obstructive sleep apnoea) 10/15/2012    reported intolerant to CPAP -- she says she doesn't have LION     Pain in joint, forearm -- L unhealed Fx 2013     Premature menopause age 37 07/10/2012    OCP (vaginal bldg)-->HT which she stopped 2 mo later documented at Andre  12, 2007 visit (age 51).      Restless leg syndrome      Stiffness of joint, not elsewhere classified, hand 03/05/2013     Tremor 10/15/2011    head     Type 2 DM with Neuropathy 1985    started with gestational diabetes     Uncomplicated asthma        Social History:   Social History     Tobacco Use     Smoking status: Never Smoker     Smokeless tobacco: Never Used   Substance Use Topics     Alcohol use: No     Alcohol/week: 0.0 standard drinks       Family History:   Family History   Problem Relation Age of Onset     Hyperlipidemia Mother      Diabetes Mother      Hypertension Mother      Genetic Disorder Father      Mental Illness Father      Diabetes Father      Hypertension Father      Mental Illness Other         family hx     Heart Disease Other      Diabetes Other      Cancer Other      Mental Illness Other      Diabetes Other      Glaucoma No family hx of      Macular Degeneration No family hx of        Allergies:   Allergies   Allergen Reactions     Percocet [Oxycodone-Acetaminophen] Nausea and Vomiting     Novocain [Procaine Hcl] Hives     Had reaction 25 years ago to old renetta. Pt reports multiple injections of lidocaine since then without reaction.  Tolerated lidocaine injection today without difficulty.  Osmar Mark MD IR Service.       Active Medications:   Current Outpatient Medications   Medication Sig Dispense Refill     acetaminophen (TYLENOL) 500 MG tablet Take 1,500 mg by mouth every 6 hours as needed for mild pain       acetylcysteine (N-ACETYL CYSTEINE) 600 MG CAPS capsule Take 1 capsule (600 mg) by mouth 2 times daily (Patient does not know what strength they are taking.) 60 capsule 3     amylase-lipase-protease (CREON 24) 03732-31646 units CPEP per EC capsule Take 3 capsules by mouth 3 times daily (with meals) 810 capsule 11     ARIPiprazole (ABILIFY) 2 MG tablet Take 1.5 tablets (3 mg) by mouth 2 times daily 90 tablet 2     Cholecalciferol (VITAMIN D) 1000 UNITS capsule Take 1,000 Units  by mouth daily 30 capsule      cyanocolbalamin (VITAMIN  B-12) 1000 MCG tablet Take 1 tablet by mouth daily. (Patient taking differently: Take 500 mcg by mouth daily) 30 tablet 0     cycloSPORINE modified (GENERIC EQUIVALENT) 25 MG capsule Take 5 capsules (125 mg) by mouth 2 times daily TAKE 5 CAPSULES (125MG) BY MOUTH TWO TIMES A  capsule 11     ferrous sulfate (FEROSUL) 325 (65 Fe) MG tablet Take 1 tablet (325 mg) by mouth daily (with breakfast) 100 tablet 0     fluticasone (FLONASE) 50 MCG/ACT nasal spray Spray 1 spray into both nostrils daily (Patient taking differently: Spray 1 spray into both nostrils daily as needed) 48 g 3     gabapentin (NEURONTIN) 300 MG capsule Take 2 capsules (600 mg) by mouth At Bedtime 180 capsule 2     Lactobacillus Acid-Pectin (LACTOBACILLUS ACIDOPHILUS) TABS Take 1 tablet by mouth daily (with dinner)       latanoprost (XALATAN) 0.005 % ophthalmic solution Place 1 drop into both eyes At Bedtime 7.5 mL 4     levalbuterol (XOPENEX HFA) 45 MCG/ACT inhaler Inhale 2 puffs into the lungs every 6 hours as needed for shortness of breath / dyspnea or wheezing 15 g 3     LORazepam (ATIVAN) 0.5 MG tablet Take 1 tab (0.5mg) twice weekly as need for anxiety 30 tablet 0     metFORMIN (GLUCOPHAGE-XR) 500 MG 24 hr tablet Take 3 tablets (1,500 mg) by mouth daily (with dinner) 270 tablet 1     ondansetron (ZOFRAN-ODT) 4 MG ODT tab Take 1 tablet (4 mg) by mouth every 6 hours as needed for nausea 20 tablet 4     pantoprazole (PROTONIX) 40 MG EC tablet Take 1 tablet (40 mg) by mouth daily 90 tablet 3     Polyvinyl Alcohol-Povidone (REFRESH OP) Apply to eye as needed Both eyes       prazosin (MINIPRESS) 2 MG capsule Take 1 capsule (2 mg) by mouth At Bedtime 30 capsule 3     prazosin (MINIPRESS) 5 MG capsule Take 3 x 5mg (15mg) caps + 1 x 2mg caps at bedtime (total dose=17mg) 90 capsule 3     propranolol (INDERAL) 20 MG tablet Take 20 mg by mouth 2 times daily Afternoon & evening.       propranolol  (INDERAL) 40 MG tablet Take 40 mg by mouth every morning       psyllium (METAMUCIL/KONSYL) capsule Take 5 capsules by mouth every evening With supper.       sulfamethoxazole-trimethoprim (BACTRIM) 400-80 MG tablet Take 1 tablet by mouth daily 90 tablet 3     topiramate (TOPAMAX) 200 MG tablet Take 1 tablet (200 mg) by mouth 2 times daily 60 tablet 11     vilazodone (VIIBRYD) 10 MG TABS tablet Take 10mg tab with 40mg tab for total daily dose of 50 mg 30 tablet 3     vilazodone (VIIBRYD) 40 MG TABS tablet Take 40mg tab with 10mg tab for total daily dose of 50 mg 30 tablet 3     apixaban ANTICOAGULANT (ELIQUIS) 5 MG tablet Take 5 mg by mouth 2 times daily (Patient not taking: Reported on 6/6/2022)       bisacodyl (DULCOLAX) 5 MG EC tablet Take as directed. One day before exam take 2 tablets at 3 PM. Take 2 tablets at 11 PM. 4 tablet 0     fluticasone (ARNUITY ELLIPTA) 100 MCG/ACT inhaler Inhale 1 puff into the lungs daily (Patient not taking: No sig reported) 1 each 3     nystatin (MYCOSTATIN) 021007 UNIT/GM external cream Apply topically 2 times daily To toenails. (Patient not taking: No sig reported) 90 g 5     order for DME Walker with front wheels and a seat. 1 Units 0     polyethylene glycol (GOLYTELY) 236 g suspension Take as directed. One day before exam fill the jug with water. Cover and shake until well mixed. At 6 PM start drinking an 8oz glass of mixture every 15 minutes until jug is 1/2 empty. Store remainder in the refrigerator.  At 11 PM Start drinking the other half of the Golytely jug. Drink one 8-ounce glass every 15 minutes until the jug is empty. 4000 mL 0     Vaginal Lubricant (REPLENS) GEL Use vaginally as needed. Can use up to 3 times per week. (Patient not taking: No sig reported) 35 g 11       Systemic Review:   CONSTITUTIONAL: NEGATIVE for fever, chills, change in weight  ENT/MOUTH: NEGATIVE for ear, mouth and throat problems  RESP: NEGATIVE for significant cough or SOB  CV: NEGATIVE for chest  "pain, palpitations or peripheral edema    Physical Examination:   Vital Signs: /65   Pulse 50   Temp 98.6  F (37  C) (Temporal)   Resp 18   Ht 1.549 m (5' 1\")   Wt 86.2 kg (190 lb)   LMP  (LMP Unknown)   SpO2 98%   BMI 35.90 kg/m    GENERAL: healthy, alert and no distress  RESP: Normal respiratory excursion   CV: regular rates and rhythm and no peripheral edema  ABDOMEN: soft, nontender and bowel sounds normal  MS: no gross musculoskeletal defects noted, no edema    Plan: Appropriate to proceed as scheduled.      Thomas M. Leventhal, MD  6/8/2022    PCP:  Horacio Sheridan    "

## 2022-06-08 NOTE — ANESTHESIA PREPROCEDURE EVALUATION
Anesthesia Pre-Procedure Evaluation    Patient: Elizabeth Palma   MRN: [id number] : 1955        Procedure : Procedure(s):  COLONOSCOPY          Past Medical History:   Diagnosis Date     Abnormal MRI, cervical spine 10/15/2011    2011; mild changes noted. Study done for left arm symptoms Impression:  1. Mild multilevel degenerative disc disease with no significant canal or neural stenosis seen. motion artifact on the STIR images in these are not interpretable. The remaining images were interpreted      Autosomal dominant polycystic kidney disease 2011     (Problem list name updated by automated process. Provider to review and confirm.)     CMC DJD(carpometacarpal degenerative joint disease), localized primary 2013     -donor kidney transplant 2014     Gastroesophageal reflux disease      Generalized anxiety disorder 11/15/2012     Glaucoma      Hyperlipidemia 10/15/2011     Hyperparathyroidism, secondary (H) 2015     Hypertension     resolved     Immunosuppressed status (H) 2014     Major depressive disorder, recurrent episode, moderate (H) 11/15/2012     Obesity (BMI 30-39.9)      OP (osteoporosis) T score -3.8 2009 T-score -3.7      LION (obstructive sleep apnoea) 10/15/2012    reported intolerant to CPAP -- she says she doesn't have LION     Pain in joint, forearm -- L unhealed Fx 2013     Premature menopause age 37 07/10/2012    OCP (vaginal bldg)-->HT which she stopped 2 mo later documented at 2007 visit (age 51).      Restless leg syndrome      Stiffness of joint, not elsewhere classified, hand 2013     Tremor 10/15/2011    head     Type 2 DM with Neuropathy     started with gestational diabetes     Uncomplicated asthma       Past Surgical History:   Procedure Laterality Date     ABDOMEN SURGERY       ANKLE SURGERY       C/SECTION, LOW TRANSVERSE      x 2     CHOLECYSTECTOMY       COLONOSCOPY       ESOPHAGOSCOPY,  GASTROSCOPY, DUODENOSCOPY (EGD), COMBINED N/A 05/19/2015    Procedure: COMBINED ESOPHAGOSCOPY, GASTROSCOPY, DUODENOSCOPY (EGD);  Surgeon: Sky Davey MD;  Location:  GI     ESOPHAGOSCOPY, GASTROSCOPY, DUODENOSCOPY (EGD), COMBINED N/A 05/19/2015    Procedure: COMBINED ESOPHAGOSCOPY, GASTROSCOPY, DUODENOSCOPY (EGD), BIOPSY SINGLE OR MULTIPLE;  Surgeon: Sky Davey MD;  Location:  GI     EYE SURGERY       LAPAROSCOPY, SURGICAL; REPAIR INCISIONAL OR VENTRAL HERNIA       LASER SURGERY OF EYE Left 10/01/2020    sever vitreous strands     ORTHOPEDIC SURGERY       HERMINIA EN Y BOWEL  1990     WRIST SURGERY       [facility] TRANSPLANTATION OF KIDNEY  03/2014      Allergies   Allergen Reactions     Percocet [Oxycodone-Acetaminophen] Nausea and Vomiting     Novocain [Procaine Hcl] Hives     Had reaction 25 years ago to old renetta. Pt reports multiple injections of lidocaine since then without reaction.  Tolerated lidocaine injection today without difficulty.  Osmar Mark MD IR Service.      Social History     Tobacco Use     Smoking status: Never Smoker     Smokeless tobacco: Never Used   Substance Use Topics     Alcohol use: No     Alcohol/week: 0.0 standard drinks      Wt Readings from Last 1 Encounters:   06/06/22 88.2 kg (194 lb 8 oz)              OUTSIDE LABS:  CBC:   Lab Results   Component Value Date    WBC 5.9 06/03/2022    WBC 1.8 (L) 04/27/2022    HGB 13.2 06/03/2022    HGB 10.1 (L) 04/27/2022    HCT 41.9 06/03/2022    HCT 32.4 (L) 04/27/2022     06/03/2022    PLT 90 (L) 04/27/2022     BMP:   Lab Results   Component Value Date     06/03/2022     04/27/2022    POTASSIUM 4.7 06/03/2022    POTASSIUM 3.4 04/27/2022    CHLORIDE 106 06/03/2022    CHLORIDE 113 (H) 04/27/2022    CO2 22 06/03/2022    CO2 19 (L) 04/27/2022    BUN 26 06/03/2022    BUN 11 04/27/2022    CR 1.60 (H) 06/03/2022    CR 0.79 04/27/2022     (H) 06/03/2022     (H) 04/27/2022     COAGS:   Lab Results    Component Value Date    PTT 31 03/20/2014    INR 1.13 04/16/2014    FIBR 487 (H) 03/20/2014     POC:   Lab Results   Component Value Date     (H) 06/22/2021     HEPATIC:   Lab Results   Component Value Date    ALBUMIN 3.0 (L) 04/26/2022    PROTTOTAL 5.8 (L) 04/26/2022    ALT 50 04/26/2022    AST 30 04/26/2022    ALKPHOS 52 04/26/2022    BILITOTAL 0.4 04/26/2022     OTHER:   Lab Results   Component Value Date    PH 7.41 10/16/2011    LACT 2.1 03/20/2014    A1C 7.3 (H) 04/19/2022    MARY 9.2 06/03/2022    PHOS 3.2 08/19/2019    MAG 1.9 06/09/2014    LIPASE 133 08/21/2012    TSH 0.92 02/10/2022    T4 0.96 10/30/2018    CRP 3.7 04/27/2022    SED 49 (H) 06/16/2021       Anesthesia Plan    ASA Status:  3      Anesthesia Type: MAC.     - Reason for MAC: straight local not clinically adequate   Induction: Intravenous, Propofol.   Maintenance: TIVA.        Consents    Anesthesia Plan(s) and associated risks, benefits, and realistic alternatives discussed. Questions answered and patient/representative(s) expressed understanding.    - Discussed:     - Discussed with:  Patient      - Extended Intubation/Ventilatory Support Discussed: No.      - Patient is DNR/DNI Status: No    Use of blood products discussed: No .     Postoperative Care       PONV prophylaxis: Ondansetron (or other 5HT-3), Background Propofol Infusion     Comments:                Dex Donohue MD

## 2022-06-08 NOTE — ANESTHESIA CARE TRANSFER NOTE
Patient: Elizabeth Palma    Procedure: Procedure(s):  COLONOSCOPY, WITH POLYPECTOMY       Diagnosis: Aftercare following organ transplant [Z48.298]  Diagnosis Additional Information: No value filed.    Anesthesia Type:   MAC     Note:    Oropharynx: spontaneously breathing  Level of Consciousness: drowsy  Oxygen Supplementation: room air    Independent Airway: airway patency satisfactory and stable  Dentition: dentition unchanged  Vital Signs Stable: post-procedure vital signs reviewed and stable  Report to RN Given: handoff report given  Patient transferred to: Phase II    Handoff Report: Identifed the Patient, Identified the Reponsible Provider, Reviewed the pertinent medical history, Discussed the surgical course, Reviewed Intra-OP anesthesia mangement and issues during anesthesia, Set expectations for post-procedure period and Allowed opportunity for questions and acknowledgement of understanding      Vitals:  Vitals Value Taken Time   /61 06/08/22 1151   Temp 36.6  C (97.8  F) 06/08/22 1151   Pulse 48 06/08/22 1151   Resp 12 06/08/22 1151   SpO2 98 % 06/08/22 1151       Electronically Signed By: GERHARD Hilton CRNA  June 8, 2022  11:52 AM

## 2022-06-09 LAB
PATH REPORT.COMMENTS IMP SPEC: NORMAL
PATH REPORT.FINAL DX SPEC: NORMAL
PATH REPORT.GROSS SPEC: NORMAL
PATH REPORT.MICROSCOPIC SPEC OTHER STN: NORMAL
PATH REPORT.RELEVANT HX SPEC: NORMAL
PHOTO IMAGE: NORMAL

## 2022-06-09 PROCEDURE — 88305 TISSUE EXAM BY PATHOLOGIST: CPT | Mod: 26 | Performed by: PATHOLOGY

## 2022-06-10 ENCOUNTER — TELEPHONE (OUTPATIENT)
Dept: TRANSPLANT | Facility: CLINIC | Age: 67
End: 2022-06-10
Payer: MEDICARE

## 2022-06-10 NOTE — TELEPHONE ENCOUNTER
ISSUE: labs were not drawn following IVF on 6/4/2022.    LPN TASK:     Please call patient has ask her to has full set of transplant labs done asap.  Please ensure she has lab orders.    Marivel Marcelo RN   Transplant Coordinator  [phone]

## 2022-06-14 ENCOUNTER — VIRTUAL VISIT (OUTPATIENT)
Dept: NEPHROLOGY | Facility: CLINIC | Age: 67
End: 2022-06-14
Attending: INTERNAL MEDICINE
Payer: MEDICARE

## 2022-06-14 VITALS — WEIGHT: 193.5 LBS | BODY MASS INDEX: 36.56 KG/M2

## 2022-06-14 DIAGNOSIS — Z94.0 HTN, KIDNEY TRANSPLANT RELATED: ICD-10-CM

## 2022-06-14 DIAGNOSIS — Z94.0 KIDNEY REPLACED BY TRANSPLANT: Primary | ICD-10-CM

## 2022-06-14 DIAGNOSIS — I15.1 HTN, KIDNEY TRANSPLANT RELATED: ICD-10-CM

## 2022-06-14 DIAGNOSIS — D84.9 IMMUNOSUPPRESSION (H): ICD-10-CM

## 2022-06-14 DIAGNOSIS — N17.9 ACUTE KIDNEY INJURY (H): ICD-10-CM

## 2022-06-14 DIAGNOSIS — D49.0 IPMN (INTRADUCTAL PAPILLARY MUCINOUS NEOPLASM): ICD-10-CM

## 2022-06-14 PROBLEM — N18.5 CHRONIC KIDNEY DISEASE, STAGE V (H): Status: RESOLVED | Noted: 2022-01-13 | Resolved: 2022-06-14

## 2022-06-14 PROBLEM — R41.82 ALTERED MENTAL STATUS: Status: RESOLVED | Noted: 2022-04-26 | Resolved: 2022-06-14

## 2022-06-14 PROBLEM — R31.9 URINARY TRACT INFECTION WITH HEMATURIA, SITE UNSPECIFIED: Status: RESOLVED | Noted: 2021-06-18 | Resolved: 2022-06-14

## 2022-06-14 PROBLEM — N39.0 URINARY TRACT INFECTION WITH HEMATURIA, SITE UNSPECIFIED: Status: RESOLVED | Noted: 2021-06-18 | Resolved: 2022-06-14

## 2022-06-14 PROBLEM — U07.1 INFECTION DUE TO 2019 NOVEL CORONAVIRUS: Status: RESOLVED | Noted: 2022-04-26 | Resolved: 2022-06-14

## 2022-06-14 PROBLEM — R78.81 BACTEREMIA: Status: RESOLVED | Noted: 2021-06-19 | Resolved: 2022-06-14

## 2022-06-14 PROBLEM — N18.30 CHRONIC KIDNEY DISEASE, STAGE 3 (H): Status: RESOLVED | Noted: 2021-01-29 | Resolved: 2022-06-14

## 2022-06-14 PROBLEM — D50.9 ANEMIA, IRON DEFICIENCY: Status: RESOLVED | Noted: 2020-01-10 | Resolved: 2022-06-14

## 2022-06-14 PROCEDURE — G0463 HOSPITAL OUTPT CLINIC VISIT: HCPCS | Mod: PN,RTG | Performed by: INTERNAL MEDICINE

## 2022-06-14 PROCEDURE — 99214 OFFICE O/P EST MOD 30 MIN: CPT | Mod: 95 | Performed by: INTERNAL MEDICINE

## 2022-06-14 RX ORDER — PROPRANOLOL HYDROCHLORIDE 40 MG/1
80 TABLET ORAL EVERY MORNING
COMMUNITY
Start: 2022-06-14 | End: 2022-06-24

## 2022-06-14 RX ORDER — YOHIMBE BARK 500 MG
100 CAPSULE ORAL DAILY
COMMUNITY
Start: 2022-06-14

## 2022-06-14 RX ORDER — PROPRANOLOL HYDROCHLORIDE 20 MG/1
40 TABLET ORAL 2 TIMES DAILY
COMMUNITY
Start: 2022-06-14 | End: 2022-08-02

## 2022-06-14 RX ORDER — ASCORBIC ACID 250 MG
500 TABLET,CHEWABLE ORAL DAILY
COMMUNITY
Start: 2022-06-14

## 2022-06-14 RX ORDER — SIMVASTATIN 20 MG
20 TABLET ORAL AT BEDTIME
COMMUNITY
Start: 2022-06-14

## 2022-06-14 RX ORDER — MYCOPHENOLIC ACID 180 MG/1
540 TABLET, DELAYED RELEASE ORAL 2 TIMES DAILY
Qty: 540 TABLET | Refills: 3 | Status: SHIPPED | OUTPATIENT
Start: 2022-06-14 | End: 2022-08-19

## 2022-06-14 RX ORDER — FUROSEMIDE 40 MG
40 TABLET ORAL DAILY
COMMUNITY
Start: 2022-06-14 | End: 2022-06-22 | Stop reason: DRUGHIGH

## 2022-06-14 RX ORDER — MYCOPHENOLIC ACID 180 MG/1
360 TABLET, DELAYED RELEASE ORAL 2 TIMES DAILY
COMMUNITY
Start: 2022-06-14

## 2022-06-14 ASSESSMENT — PAIN SCALES - GENERAL: PAINLEVEL: NO PAIN (0)

## 2022-06-14 NOTE — LETTER
6/14/2022       RE: Elizabeth Palma  [address]  [address]  [facility] [address]     Dear Colleague,    Thank you for referring your patient, Elizabeth Palma, to the [facility] [place] [facility]. Please see a copy of my visit note below.        CHRONIC TRANSPLANT NEPHROLOGY VISIT    Assessment & Plan   # DDKT: CORA, Scr 1.6 on 6/3. Received 1L IVF on 6/4.   - Baseline Creatinine:  ~ 0.9-1   - Proteinuria: Normal (<0.2 grams)   - Date DSA Last Checked: Nov/2019      Latest DSA: No   - BK Viremia: No   - Kidney Tx Biopsy: Apr 16, 2014; Result: mild tubular epithelial injury with concern for acute CNI toxicity. Mild arteriosclerosis and arterial hyalinosis    -Repeat labs now    # Immunosuppression: Cyclosporine (goal ) and Mycophenolic acid (dose 540 mg every 12 hours)          - Continue with intensive monitoring of immunosuppression for efficacy and toxicity.          - Changes: Not at this time.     # Infection Prophylaxis:   - PJP: Sulfa/TMP (Bactrim)    # Hypertension: Controlled;  Goal BP: < 130/80   - Changes: Not at this time. On lasix 40mg daily, prazosin 17mg at bedtime, propranolol 80/40/40.     # Diabetes: Borderline control (HbA1c 7-9%) Last HbA1c: 7.3%   - Management as per primary team. On metformin 1500mg dialy     # Anemia in Chronic Renal Disease: Hgb: Trend up      RAYMUNDO: No   - Iron studies: Not checked recently    # Mineral Bone Disorder:   - Secondary renal hyperparathyroidism; PTH level: Minimally elevated ( pg/ml)        On treatment: None, repeat with next labs  - Vitamin D; level: Not checked recently, but was normal last check        On supplement: Yes  - Calcium; level: Normal        On supplement: No  - Phosphorus; level: Normal        On supplement: No     # Asthma:   -Follows with pulmonary. Now on beclomethasone inhaler.     # Obesity:    -Follows in weight loss clinic. Taking trazodone and  metformin. She states she is not moving and eats only 1 meal per day    # NELDA and MDD:   -Follows with psychiatry     # Pancreatic cysts:   -Followed by Dr. Joya. He recommends repeat MRI/MRCP 3/2023 to look for stability of likely side branch IPMNs    # Skin Cancer Risk:    - Discussed sun protection and recommend regular follow up with Dermatology.    # COVID-19 Virus Review: Discussed COVID-19 virus and the potential medical risks.  Reviewed preventative health recommendations, including wearing a mask where appropriate.  Recommended COVID vaccination should be up to date with either an initial vaccination or booster shot when appropriate.  Asked the patient to inform the transplant center if they are exposed or diagnosed with this virus.   -Diagnosed with COVID 4/2022, admitted and treated with remdesivir, MMF dose decreased.    # COVID Vaccination Up To Date: Yes      # Medical Compliance: Yes    # Transplant History:  Etiology of Kidney Failure: Polycystic kidney disease (PKD)  Tx: DDKT  Transplant: 3/20/2014 (Kidney)  Significant changes in immunosuppression: None  Significant transplant-related complications: None    Transplant Office Phone Number: [phone]    Assessment and plan was discussed with the patient and she voiced her understanding and agreement.    Return visit: Return in about 4 months (around 10/14/2022) for in person visit.    Francisco Jordan MD    Chief Complaint   Ms. Palma is a 66 year old here for routine follow up, kidney transplant and immunosuppression management.    History of Present Illness   I last saw Elizabeth in 3/2021. Since then she was admitted in 6/2021 due to E.coli bacteremia from pyelonephritis, treated with ceftriaxone and transitioned to ceftin for 14d course. She was diagnosed with sinusitis in 9/2021 and treated with azithromycin. [facility] was [facility] due to pyelonephritis with E.coli, put on ceftriaxone. Due to recurrent fever she was put on IV  vanc, ceftriaxone switched to cefepime. CT chest concerning for covid PNA but pt not hypoxic. Given remdesivir again, MPA held again, presdnisone restarted. She was seen in the ED on 22 and concern was for PNA. She was put on cefprozil and azithromycin x7 days. She had a colonoscopy on 22 that showed one adenoma.     She currently feels well. She has been trying to hydrate since her colonoscopy. She has been using her inhaler but is breathing well. She is having heart burn but is not having chest pain. She is not moving because her bones hurt and is only eating one meal daily. She states that she is dizzy when she stands, going on for the past year.     Home BP: Not checked    Problem List   Patient Active Problem List   Diagnosis     Hypertension     Hyperlipidemia     Abnormal MRI, cervical spine     Sensory loss     Premature menopause age 37     OP (osteoporosis) T score -3.8     Major depressive disorder, recurrent episode, moderate (H)     Generalized anxiety disorder     CMC DJD(carpometacarpal degenerative joint disease), localized primary     Pain in joint, forearm -- L unhealed Fx     -donor kidney transplant     Immunosuppressed status (H)     Hyperparathyroidism, secondary (H)     Hyperparathyroidism (H)     Senile osteoporosis     Pain in joint involving ankle and foot     Nightmares associated with chronic post-traumatic stress disorder     DM type 2 with Neuropathy, Hgb A1C 7.3 on 21     Posttraumatic stress disorder     Plantar fasciitis, bilateral     Right knee pain     Age-related osteoporosis without current pathological fracture     Narcissistic personality disorder (H)     Personal history of other drug therapy     Median sensory neuropathy, left     Marital conflict     Suicidal ideation     Autosomal dominant polycystic kidney disease     Secondary hyperparathyroidism (H)     Anemia, iron deficiency     Morbid obesity (H)     Chronic kidney disease, stage 3 (H)     PCKD,  S/P Renal Transplant 2014 ([facility])     Intractable back pain     UTI     Bacteremia     Chronic kidney disease, stage V (H)     Altered mental status     Generalized muscle weakness     History of kidney transplant     Infection due to 2019 novel coronavirus       Allergies   Allergies   Allergen Reactions     Percocet [Oxycodone-Acetaminophen] Nausea and Vomiting     Novocain [Procaine Hcl] Hives     Had reaction 25 years ago to old renetta. Pt reports multiple injections of lidocaine since then without reaction.  Tolerated lidocaine injection today without difficulty.  Osmar Mark MD IR Service.       Medications   Current Outpatient Medications   Medication Sig     acetaminophen (TYLENOL) 500 MG tablet Take 1,500 mg by mouth every 6 hours as needed for mild pain     acetylcysteine (N-ACETYL CYSTEINE) 600 MG CAPS capsule Take 1 capsule (600 mg) by mouth 2 times daily (Patient does not know what strength they are taking.)     apixaban ANTICOAGULANT (ELIQUIS) 5 MG tablet Take 1 tablet (5 mg) by mouth 2 times daily     ARIPiprazole (ABILIFY) 2 MG tablet Take 1.5 tablets (3 mg) by mouth 2 times daily     ascorbic acid (VITAMIN C) 250 MG CHEW chewable tablet Take 2 tablets (500 mg) by mouth daily     beclomethasone HFA (QVAR REDIHALER) 40 MCG/ACT inhaler Inhale 1 puff into the lungs 2 times daily     cycloSPORINE modified (GENERIC EQUIVALENT) 25 MG capsule Take 5 capsules (125 mg) by mouth 2 times daily TAKE 5 CAPSULES (125MG) BY MOUTH TWO TIMES A DAY     ferrous sulfate (FEROSUL) 325 (65 Fe) MG tablet Take 1 tablet (325 mg) by mouth daily (with breakfast)     fluticasone (FLONASE) 50 MCG/ACT nasal spray Spray 1 spray into both nostrils daily (Patient taking differently: Spray 1 spray into both nostrils daily as needed)     furosemide (LASIX) 40 MG tablet Take 1 tablet (40 mg) by mouth daily     gabapentin (NEURONTIN) 300 MG capsule Take 2 capsules (600 mg) by mouth At Bedtime     Lactobacillus (ACIDOPHILUS) 100 MG  CAPS Take 100 mg by mouth daily     latanoprost (XALATAN) 0.005 % ophthalmic solution Place 1 drop into both eyes At Bedtime     levalbuterol (XOPENEX HFA) 45 MCG/ACT inhaler Inhale 2 puffs into the lungs every 6 hours as needed for shortness of breath / dyspnea or wheezing     LORazepam (ATIVAN) 0.5 MG tablet Take 1 tab (0.5mg) twice weekly as need for anxiety     metFORMIN (GLUCOPHAGE-XR) 500 MG 24 hr tablet Take 3 tablets (1,500 mg) by mouth daily (with dinner)     mycophenolic acid (GENERIC EQUIVALENT) 180 MG EC tablet Take 2 tablets (360 mg) by mouth 2 times daily     mycophenolic acid (GENERIC EQUIVALENT) 180 MG EC tablet Take 3 tablets (540 mg) by mouth 2 times daily for 90 days     ondansetron (ZOFRAN-ODT) 4 MG ODT tab Take 1 tablet (4 mg) by mouth every 6 hours as needed for nausea     order for DME Walker with front wheels and a seat.     pantoprazole (PROTONIX) 40 MG EC tablet Take 1 tablet (40 mg) by mouth daily     Polyvinyl Alcohol-Povidone (REFRESH OP) Apply to eye as needed Both eyes     prazosin (MINIPRESS) 5 MG capsule Take 3 x 5mg (15mg) caps + 1 x 2mg caps at bedtime (total dose=17mg)     propranolol (INDERAL) 20 MG tablet Take 2 tablets (40 mg) by mouth 2 times daily Afternoon & evening.     propranolol (INDERAL) 40 MG tablet Take 2 tablets (80 mg) by mouth every morning     psyllium (METAMUCIL/KONSYL) capsule Take 5 capsules by mouth every evening With supper.     simvastatin (ZOCOR) 20 MG tablet Take 1 tablet (20 mg) by mouth At Bedtime     sulfamethoxazole-trimethoprim (BACTRIM) 400-80 MG tablet Take 1 tablet by mouth daily     topiramate (TOPAMAX) 200 MG tablet Take 1 tablet (200 mg) by mouth 2 times daily     vilazodone (VIIBRYD) 10 MG TABS tablet Take 10mg tab with 40mg tab for total daily dose of 50 mg     vilazodone (VIIBRYD) 40 MG TABS tablet Take 40mg tab with 10mg tab for total daily dose of 50 mg     No current facility-administered medications for this visit.     Medications  Discontinued During This Encounter   Medication Reason     bisacodyl (DULCOLAX) 5 MG EC tablet      Lactobacillus Acid-Pectin (LACTOBACILLUS ACIDOPHILUS) TABS      polyethylene glycol (GOLYTELY) 236 g suspension      prazosin (MINIPRESS) 2 MG capsule      propranolol (INDERAL) 40 MG tablet      propranolol (INDERAL) 20 MG tablet      apixaban ANTICOAGULANT (ELIQUIS) 5 MG tablet      Vaginal Lubricant (REPLENS) GEL      nystatin (MYCOSTATIN) 331887 UNIT/GM external cream      fluticasone (ARNUITY ELLIPTA) 100 MCG/ACT inhaler      cyanocolbalamin (VITAMIN  B-12) 1000 MCG tablet      Cholecalciferol (VITAMIN D) 1000 UNITS capsule      amylase-lipase-protease (CREON 24) 25118-26712 units CPEP per EC capsule        Physical Exam   Vital Signs: LMP  (LMP Unknown)      GENERAL APPEARANCE: alert and no distress  HENT: no obvious abnormalities on appearance  RESP: breathing appears unremarkable with normal rate, no audible wheezing or cough and no apparent shortness of breath with conversation  MS: extremities normal - no gross deformities noted  SKIN: no apparent rash and normal skin tone  NEURO: speech is clear with no obvious neurological deficits  PSYCH: mentation appears normal and affect normal    Data     Renal Latest Ref Rng & Units 6/8/2022 6/3/2022 4/27/2022   Na 133 - 144 mmol/L - 141 140   K 3.4 - 5.3 mmol/L - 4.7 3.4   Cl 94 - 109 mmol/L - 106 113(H)   CO2 20 - 32 mmol/L - 22 19(L)   BUN 7 - 30 mg/dL - 26 11   Cr 0.52 - 1.04 mg/dL - 1.60(H) 0.79   Glucose 70 - 99 mg/dL 146(H) 203(H) 149(H)   Ca  8.5 - 10.1 mg/dL - 9.2 7.6(L)   Mg 1.6 - 2.3 mg/dL - - -     Bone Health Latest Ref Rng & Units 6/15/2021 5/27/2021 5/13/2021   Phos 2.5 - 4.5 mg/dL - - -   PTHi 12 - 64 pg/mL - 38 88(H)   Vit D Def 20 - 75 ug/L 43 - -     Heme Latest Ref Rng & Units 6/3/2022 4/27/2022 4/26/2022   WBC 4.0 - 11.0 10e3/uL 5.9 1.8(L) 3.4(L)   Hgb 11.7 - 15.7 g/dL 13.2 10.1(L) 13.5   Plt 150 - 450 10e3/uL 163 90(L) 135(L)   ABSOLUTE  NEUTROPHIL 1.6 - 8.3 10e9/L - - -   ABSOLUTE LYMPHOCYTES 0.8 - 5.3 10e9/L - - -   ABSOLUTE MONOCYTES 0.0 - 1.3 10e9/L - - -   ABSOLUTE EOSINOPHILS 0.0 - 0.7 10e9/L - - -   ABSOLUTE BASOPHILS 0.0 - 0.2 10e9/L - - -   ABS IMMATURE GRANULOCYTES 0 - 0.4 10e9/L - - -   ABSOLUTE NUCLEATED RBC - - - -     Liver Latest Ref Rng & Units 4/26/2022 2/10/2022 6/18/2021   AP 40 - 150 U/L 52 52 94   TBili 0.2 - 1.3 mg/dL 0.4 0.5 1.0   DBili 0.0 - 0.2 mg/dL - - -   ALT 0 - 50 U/L 50 34 51(H)   AST 0 - 45 U/L 30 8 21   Tot Protein 6.8 - 8.8 g/dL 5.8(L) 6.3(L) 6.8   Albumin 3.4 - 5.0 g/dL 3.0(L) 3.5 2.8(L)     Pancreas Latest Ref Rng & Units 4/19/2022 6/19/2021 1/25/2021   A1C 0.0 - 5.6 % 7.3(H) 6.9(H) 7.3(H)   A1C (POC) 4.3 - 6 % - - -   Lipase 20 - 250 U/L - - -     Iron studies Latest Ref Rng & Units 2/24/2020 12/17/2019 6/2/2015   Iron 35 - 180 ug/dL 72 29(L) -   Iron sat 15 - 46 % 27 6(L) -   Ferritin 8 - 252 ng/mL 128 4(L) 10     [facility] Txp Virology Latest Ref Rng & Units 3/4/2022 2/10/2022 6/15/2021   CMV IgG EU/mL - - -   CVM DNA Quant - - - Plasma   CMV QUANT IU/ML Not Detected IU/mL Not Detected Not Detected CMV DNA Not Detected   LOG IU/ML OF CMVQNT <2.1 [Log:IU]/mL - - Not Calculated   BK Spec - - - -   BK Res BKNEG:BK Virus DNA Not Detected copies/mL - - -   BK Log <2.7 Log copies/mL - - -   EBV IgG - - - -   EBV VCA IGG ANTIBODY U/mL - - -   EBV CAPSID ANTIBODY IGG 0.0 - 0.8 AI - - -   EBV DNA COPIES/ML EBVNEG:EBV DNA Not Detected [Copies]/mL - - EBV DNA Not Detected   EBV DNA LOG OF COPIES <2.7 [Log:copies]/mL - - Not Calculated   Hep B Core NEG - - -   Hep B Surf - - - -   HIV 1&2 NEG - - -        Recent Labs   Lab Test 07/16/14  0917 07/22/14  0909 06/02/15  0848   DOSTAC 7/15/14  2100 7/21/14 AT 2100 6/1/15 2100   TACROL 8.4 10.9 Test canceled - Lab  error     Recent Labs   Lab Test 02/10/22  1431 03/04/22  0836 03/21/22  0914   DOSMPA 2/11/2022   9:00 AM  --  3/20/2022   9:00 PM   MPACID 5.07* 2.27 1.19    MPAG >200.0* 131.9* 112.9*       Again, thank you for allowing me to participate in the care of your patient.      Sincerely,    Francisco Jordan MD

## 2022-06-14 NOTE — PROGRESS NOTES
PT did not take at home michelle Johnson is a 66 year old who is being evaluated via a billable video visit.      How would you like to obtain your AVS? MyChart  If the video visit is dropped, the invitation should be resent by: Text to cell phone: [phone]  Will anyone else be joining your video visit? No    Video Start Time: 2:35 PM  Video-Visit Details    Type of service:  Video Visit    Video End Time:2:58 PM    Originating Location (pt. Location): Home    Distant Location (provider location):  [facility] [place]     Platform used for Video Visit: My Single Point        CHRONIC TRANSPLANT NEPHROLOGY VISIT    Assessment & Plan   # DDKT: CORA, Scr 1.6 on 6/3. Received 1L IVF on 6/4.   - Baseline Creatinine:  ~ 0.9-1   - Proteinuria: Normal (<0.2 grams)   - Date DSA Last Checked: Nov/2019      Latest DSA: No   - BK Viremia: No   - Kidney Tx Biopsy: Apr 16, 2014; Result: mild tubular epithelial injury with concern for acute CNI toxicity. Mild arteriosclerosis and arterial hyalinosis    -Repeat labs now    # Immunosuppression: Cyclosporine (goal ) and Mycophenolic acid (dose 540 mg every 12 hours)          - Continue with intensive monitoring of immunosuppression for efficacy and toxicity.          - Changes: Not at this time.     # Infection Prophylaxis:   - PJP: Sulfa/TMP (Bactrim)    # Hypertension: Controlled;  Goal BP: < 130/80   - Changes: Not at this time. On lasix 40mg daily, prazosin 17mg at bedtime, propranolol 80/40/40.     # Diabetes: Borderline control (HbA1c 7-9%) Last HbA1c: 7.3%   - Management as per primary team. On metformin 1500mg dialy     # Anemia in Chronic Renal Disease: Hgb: Trend up      RAYMUNDO: No   - Iron studies: Not checked recently    # Mineral Bone Disorder:   - Secondary renal hyperparathyroidism; PTH level: Minimally elevated ( pg/ml)        On treatment: None, repeat with next labs  - Vitamin D; level: Not checked recently, but was normal last check         On supplement: Yes  - Calcium; level: Normal        On supplement: No  - Phosphorus; level: Normal        On supplement: No     # Asthma:   -Follows with pulmonary. Now on beclomethasone inhaler.     # Obesity:    -Follows in weight loss clinic. Taking trazodone and metformin. She states she is not moving and eats only 1 meal per day    # NELDA and MDD:   -Follows with psychiatry     # Pancreatic cysts:   -Followed by Dr. Joya. He recommends repeat MRI/MRCP 3/2023 to look for stability of likely side branch IPMNs    # Skin Cancer Risk:    - Discussed sun protection and recommend regular follow up with Dermatology.    # COVID-19 Virus Review: Discussed COVID-19 virus and the potential medical risks.  Reviewed preventative health recommendations, including wearing a mask where appropriate.  Recommended COVID vaccination should be up to date with either an initial vaccination or booster shot when appropriate.  Asked the patient to inform the transplant center if they are exposed or diagnosed with this virus.   -Diagnosed with COVID 4/2022, admitted and treated with remdesivir, MMF dose decreased.    # COVID Vaccination Up To Date: Yes      # Medical Compliance: Yes    # Transplant History:  Etiology of Kidney Failure: Polycystic kidney disease (PKD)  Tx: DDKT  Transplant: 3/20/2014 (Kidney)  Significant changes in immunosuppression: None  Significant transplant-related complications: None    Transplant Office Phone Number: [phone]    Assessment and plan was discussed with the patient and she voiced her understanding and agreement.    Return visit: Return in about 4 months (around 10/14/2022) for in person visit.    Francisco Jordan MD    Chief Complaint   Ms. Palma is a 66 year old here for routine follow up, kidney transplant and immunosuppression management.    History of Present Illness   I last saw Elizabeth in 3/2021. Since then she was admitted in 6/2021 due to E.coli bacteremia from pyelonephritis, treated with  ceftriaxone and transitioned to ceftin for 14d course. She was diagnosed with sinusitis in 2021 and treated with azithromycin. She was admitted to [facility] in 3022 due to pyelonephritis with E.coli, put on ceftriaxone. Due to recurrent fever she was put on IV vanc, ceftriaxone switched to cefepime. CT chest concerning for covid PNA but pt not hypoxic. Given remdesivir again, MPA held again, presdnisone restarted. She was seen in the ED on 22 and concern was for PNA. She was put on cefprozil and azithromycin x7 days. She had a colonoscopy on 22 that showed one adenoma.     She currently feels well. She has been trying to hydrate since her colonoscopy. She has been using her inhaler but is breathing well. She is having heart burn but is not having chest pain. She is not moving because her bones hurt and is only eating one meal daily. She states that she is dizzy when she stands, going on for the past year.     Home BP: Not checked    Problem List   Patient Active Problem List   Diagnosis     Hypertension     Hyperlipidemia     Abnormal MRI, cervical spine     Sensory loss     Premature menopause age 37     OP (osteoporosis) T score -3.8     Major depressive disorder, recurrent episode, moderate (H)     Generalized anxiety disorder     CMC DJD(carpometacarpal degenerative joint disease), localized primary     Pain in joint, forearm -- L unhealed Fx     -donor kidney transplant     Immunosuppressed status (H)     Hyperparathyroidism, secondary (H)     Hyperparathyroidism (H)     Senile osteoporosis     Pain in joint involving ankle and foot     Nightmares associated with chronic post-traumatic stress disorder     DM type 2 with Neuropathy, Hgb A1C 7.3 on 21     Posttraumatic stress disorder     Plantar fasciitis, bilateral     Right knee pain     Age-related osteoporosis without current pathological fracture     Narcissistic personality disorder (H)     Personal history of other drug therapy      Median sensory neuropathy, left     Marital conflict     Suicidal ideation     Autosomal dominant polycystic kidney disease     Secondary hyperparathyroidism (H)     Anemia, iron deficiency     Morbid obesity (H)     Chronic kidney disease, stage 3 (H)     PCKD, S/P Renal Transplant 2014 ([facility])     Intractable back pain     UTI     Bacteremia     Chronic kidney disease, stage V (H)     Altered mental status     Generalized muscle weakness     History of kidney transplant     Infection due to 2019 novel coronavirus       Allergies   Allergies   Allergen Reactions     Percocet [Oxycodone-Acetaminophen] Nausea and Vomiting     Novocain [Procaine Hcl] Hives     Had reaction 25 years ago to old renetta. Pt reports multiple injections of lidocaine since then without reaction.  Tolerated lidocaine injection today without difficulty.  Osmar Mark MD IR Service.       Medications   Current Outpatient Medications   Medication Sig     acetaminophen (TYLENOL) 500 MG tablet Take 1,500 mg by mouth every 6 hours as needed for mild pain     acetylcysteine (N-ACETYL CYSTEINE) 600 MG CAPS capsule Take 1 capsule (600 mg) by mouth 2 times daily (Patient does not know what strength they are taking.)     apixaban ANTICOAGULANT (ELIQUIS) 5 MG tablet Take 1 tablet (5 mg) by mouth 2 times daily     ARIPiprazole (ABILIFY) 2 MG tablet Take 1.5 tablets (3 mg) by mouth 2 times daily     ascorbic acid (VITAMIN C) 250 MG CHEW chewable tablet Take 2 tablets (500 mg) by mouth daily     beclomethasone HFA (QVAR REDIHALER) 40 MCG/ACT inhaler Inhale 1 puff into the lungs 2 times daily     cycloSPORINE modified (GENERIC EQUIVALENT) 25 MG capsule Take 5 capsules (125 mg) by mouth 2 times daily TAKE 5 CAPSULES (125MG) BY MOUTH TWO TIMES A DAY     ferrous sulfate (FEROSUL) 325 (65 Fe) MG tablet Take 1 tablet (325 mg) by mouth daily (with breakfast)     fluticasone (FLONASE) 50 MCG/ACT nasal spray Spray 1 spray into both nostrils daily (Patient  taking differently: Spray 1 spray into both nostrils daily as needed)     furosemide (LASIX) 40 MG tablet Take 1 tablet (40 mg) by mouth daily     gabapentin (NEURONTIN) 300 MG capsule Take 2 capsules (600 mg) by mouth At Bedtime     Lactobacillus (ACIDOPHILUS) 100 MG CAPS Take 100 mg by mouth daily     latanoprost (XALATAN) 0.005 % ophthalmic solution Place 1 drop into both eyes At Bedtime     levalbuterol (XOPENEX HFA) 45 MCG/ACT inhaler Inhale 2 puffs into the lungs every 6 hours as needed for shortness of breath / dyspnea or wheezing     LORazepam (ATIVAN) 0.5 MG tablet Take 1 tab (0.5mg) twice weekly as need for anxiety     metFORMIN (GLUCOPHAGE-XR) 500 MG 24 hr tablet Take 3 tablets (1,500 mg) by mouth daily (with dinner)     mycophenolic acid (GENERIC EQUIVALENT) 180 MG EC tablet Take 2 tablets (360 mg) by mouth 2 times daily     mycophenolic acid (GENERIC EQUIVALENT) 180 MG EC tablet Take 3 tablets (540 mg) by mouth 2 times daily for 90 days     ondansetron (ZOFRAN-ODT) 4 MG ODT tab Take 1 tablet (4 mg) by mouth every 6 hours as needed for nausea     order for DME Walker with front wheels and a seat.     pantoprazole (PROTONIX) 40 MG EC tablet Take 1 tablet (40 mg) by mouth daily     Polyvinyl Alcohol-Povidone (REFRESH OP) Apply to eye as needed Both eyes     prazosin (MINIPRESS) 5 MG capsule Take 3 x 5mg (15mg) caps + 1 x 2mg caps at bedtime (total dose=17mg)     propranolol (INDERAL) 20 MG tablet Take 2 tablets (40 mg) by mouth 2 times daily Afternoon & evening.     propranolol (INDERAL) 40 MG tablet Take 2 tablets (80 mg) by mouth every morning     psyllium (METAMUCIL/KONSYL) capsule Take 5 capsules by mouth every evening With supper.     simvastatin (ZOCOR) 20 MG tablet Take 1 tablet (20 mg) by mouth At Bedtime     sulfamethoxazole-trimethoprim (BACTRIM) 400-80 MG tablet Take 1 tablet by mouth daily     topiramate (TOPAMAX) 200 MG tablet Take 1 tablet (200 mg) by mouth 2 times daily     vilazodone  (VIIBRYD) 10 MG TABS tablet Take 10mg tab with 40mg tab for total daily dose of 50 mg     vilazodone (VIIBRYD) 40 MG TABS tablet Take 40mg tab with 10mg tab for total daily dose of 50 mg     No current facility-administered medications for this visit.     Medications Discontinued During This Encounter   Medication Reason     bisacodyl (DULCOLAX) 5 MG EC tablet      Lactobacillus Acid-Pectin (LACTOBACILLUS ACIDOPHILUS) TABS      polyethylene glycol (GOLYTELY) 236 g suspension      prazosin (MINIPRESS) 2 MG capsule      propranolol (INDERAL) 40 MG tablet      propranolol (INDERAL) 20 MG tablet      apixaban ANTICOAGULANT (ELIQUIS) 5 MG tablet      Vaginal Lubricant (REPLENS) GEL      nystatin (MYCOSTATIN) 961888 UNIT/GM external cream      fluticasone (ARNUITY ELLIPTA) 100 MCG/ACT inhaler      cyanocolbalamin (VITAMIN  B-12) 1000 MCG tablet      Cholecalciferol (VITAMIN D) 1000 UNITS capsule      amylase-lipase-protease (CREON 24) 73599-53135 units CPEP per EC capsule        Physical Exam   Vital Signs: LMP  (LMP Unknown)      GENERAL APPEARANCE: alert and no distress  HENT: no obvious abnormalities on appearance  RESP: breathing appears unremarkable with normal rate, no audible wheezing or cough and no apparent shortness of breath with conversation  MS: extremities normal - no gross deformities noted  SKIN: no apparent rash and normal skin tone  NEURO: speech is clear with no obvious neurological deficits  PSYCH: mentation appears normal and affect normal    Data     Renal Latest Ref Rng & Units 6/8/2022 6/3/2022 4/27/2022   Na 133 - 144 mmol/L - 141 140   K 3.4 - 5.3 mmol/L - 4.7 3.4   Cl 94 - 109 mmol/L - 106 113(H)   CO2 20 - 32 mmol/L - 22 19(L)   BUN 7 - 30 mg/dL - 26 11   Cr 0.52 - 1.04 mg/dL - 1.60(H) 0.79   Glucose 70 - 99 mg/dL 146(H) 203(H) 149(H)   Ca  8.5 - 10.1 mg/dL - 9.2 7.6(L)   Mg 1.6 - 2.3 mg/dL - - -     Bone Health Latest Ref Rng & Units 6/15/2021 5/27/2021 5/13/2021   Phos 2.5 - 4.5 mg/dL - - -    PTHi 12 - 64 pg/mL - 38 88(H)   Vit D Def 20 - 75 ug/L 43 - -     Heme Latest Ref Rng & Units 6/3/2022 4/27/2022 4/26/2022   WBC 4.0 - 11.0 10e3/uL 5.9 1.8(L) 3.4(L)   Hgb 11.7 - 15.7 g/dL 13.2 10.1(L) 13.5   Plt 150 - 450 10e3/uL 163 90(L) 135(L)   ABSOLUTE NEUTROPHIL 1.6 - 8.3 10e9/L - - -   ABSOLUTE LYMPHOCYTES 0.8 - 5.3 10e9/L - - -   ABSOLUTE MONOCYTES 0.0 - 1.3 10e9/L - - -   ABSOLUTE EOSINOPHILS 0.0 - 0.7 10e9/L - - -   ABSOLUTE BASOPHILS 0.0 - 0.2 10e9/L - - -   ABS IMMATURE GRANULOCYTES 0 - 0.4 10e9/L - - -   ABSOLUTE NUCLEATED RBC - - - -     Liver Latest Ref Rng & Units 4/26/2022 2/10/2022 6/18/2021   AP 40 - 150 U/L 52 52 94   TBili 0.2 - 1.3 mg/dL 0.4 0.5 1.0   DBili 0.0 - 0.2 mg/dL - - -   ALT 0 - 50 U/L 50 34 51(H)   AST 0 - 45 U/L 30 8 21   Tot Protein 6.8 - 8.8 g/dL 5.8(L) 6.3(L) 6.8   Albumin 3.4 - 5.0 g/dL 3.0(L) 3.5 2.8(L)     Pancreas Latest Ref Rng & Units 4/19/2022 6/19/2021 1/25/2021   A1C 0.0 - 5.6 % 7.3(H) 6.9(H) 7.3(H)   A1C (POC) 4.3 - 6 % - - -   Lipase 20 - 250 U/L - - -     Iron studies Latest Ref Rng & Units 2/24/2020 12/17/2019 6/2/2015   Iron 35 - 180 ug/dL 72 29(L) -   Iron sat 15 - 46 % 27 6(L) -   Ferritin 8 - 252 ng/mL 128 4(L) 10     [facility] Txp Virology Latest Ref Rng & Units 3/4/2022 2/10/2022 6/15/2021   CMV IgG EU/mL - - -   CVM DNA Quant - - - Plasma   CMV QUANT IU/ML Not Detected IU/mL Not Detected Not Detected CMV DNA Not Detected   LOG IU/ML OF CMVQNT <2.1 [Log:IU]/mL - - Not Calculated   BK Spec - - - -   BK Res BKNEG:BK Virus DNA Not Detected copies/mL - - -   BK Log <2.7 Log copies/mL - - -   EBV IgG - - - -   EBV VCA IGG ANTIBODY U/mL - - -   EBV CAPSID ANTIBODY IGG 0.0 - 0.8 AI - - -   EBV DNA COPIES/ML EBVNEG:EBV DNA Not Detected [Copies]/mL - - EBV DNA Not Detected   EBV DNA LOG OF COPIES <2.7 [Log:copies]/mL - - Not Calculated   Hep B Core NEG - - -   Hep B Surf - - - -   HIV 1&2 NEG - - -        Recent Labs   Lab Test 07/16/14  0917 07/22/14  0909 06/02/15  0848    DOSTAC 7/15/14  2100 7/21/14 AT 2100 6/1/15 2100   TACROL 8.4 10.9 Test canceled - Lab  error     Recent Labs   Lab Test 02/10/22  1431 03/04/22  0836 03/21/22  0914   DOSMPA 2/11/2022   9:00 AM  --  3/20/2022   9:00 PM   MPACID 5.07* 2.27 1.19   MPAG >200.0* 131.9* 112.9*

## 2022-06-15 ENCOUNTER — TELEPHONE (OUTPATIENT)
Dept: NEPHROLOGY | Facility: CLINIC | Age: 67
End: 2022-06-15

## 2022-06-15 NOTE — TELEPHONE ENCOUNTER
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

## 2022-06-16 ENCOUNTER — TELEPHONE (OUTPATIENT)
Dept: TRANSPLANT | Facility: CLINIC | Age: 67
End: 2022-06-16

## 2022-06-16 ENCOUNTER — LAB (OUTPATIENT)
Dept: LAB | Facility: CLINIC | Age: 67
End: 2022-06-16
Payer: MEDICARE

## 2022-06-16 DIAGNOSIS — Z48.298 AFTERCARE FOLLOWING ORGAN TRANSPLANT: ICD-10-CM

## 2022-06-16 DIAGNOSIS — Z79.899 ENCOUNTER FOR LONG-TERM CURRENT USE OF MEDICATION: ICD-10-CM

## 2022-06-16 DIAGNOSIS — Z94.0 KIDNEY REPLACED BY TRANSPLANT: Primary | ICD-10-CM

## 2022-06-16 DIAGNOSIS — R79.89 ELEVATED SERUM CREATININE: ICD-10-CM

## 2022-06-16 DIAGNOSIS — Z94.0 KIDNEY REPLACED BY TRANSPLANT: ICD-10-CM

## 2022-06-16 DIAGNOSIS — Z94.0 KIDNEY TRANSPLANTED: ICD-10-CM

## 2022-06-16 LAB
ANION GAP SERPL CALCULATED.3IONS-SCNC: 9 MMOL/L (ref 3–14)
BUN SERPL-MCNC: 24 MG/DL (ref 7–30)
CALCIUM SERPL-MCNC: 9.2 MG/DL (ref 8.5–10.1)
CHLORIDE BLD-SCNC: 107 MMOL/L (ref 94–109)
CO2 SERPL-SCNC: 22 MMOL/L (ref 20–32)
CREAT SERPL-MCNC: 1.73 MG/DL (ref 0.52–1.04)
CREAT UR-MCNC: 135 MG/DL
CYCLOSPORINE BLD LC/MS/MS-MCNC: 132 UG/L (ref 50–400)
ERYTHROCYTE [DISTWIDTH] IN BLOOD BY AUTOMATED COUNT: 14.3 % (ref 10–15)
GFR SERPL CREATININE-BSD FRML MDRD: 32 ML/MIN/1.73M2
GLUCOSE BLD-MCNC: 141 MG/DL (ref 70–99)
HCT VFR BLD AUTO: 41.8 % (ref 35–47)
HGB BLD-MCNC: 13.2 G/DL (ref 11.7–15.7)
MCH RBC QN AUTO: 29.7 PG (ref 26.5–33)
MCHC RBC AUTO-ENTMCNC: 31.6 G/DL (ref 31.5–36.5)
MCV RBC AUTO: 94 FL (ref 78–100)
PLATELET # BLD AUTO: 160 10E3/UL (ref 150–450)
POTASSIUM BLD-SCNC: 4.6 MMOL/L (ref 3.4–5.3)
PROT UR-MCNC: 0.37 G/L
PROT/CREAT 24H UR: 0.27 G/G CR (ref 0–0.2)
RBC # BLD AUTO: 4.44 10E6/UL (ref 3.8–5.2)
SODIUM SERPL-SCNC: 138 MMOL/L (ref 133–144)
TME LAST DOSE: NORMAL H
TME LAST DOSE: NORMAL H
WBC # BLD AUTO: 6.9 10E3/UL (ref 4–11)

## 2022-06-16 PROCEDURE — 85027 COMPLETE CBC AUTOMATED: CPT

## 2022-06-16 PROCEDURE — 80158 DRUG ASSAY CYCLOSPORINE: CPT

## 2022-06-16 PROCEDURE — 36415 COLL VENOUS BLD VENIPUNCTURE: CPT

## 2022-06-16 PROCEDURE — 80048 BASIC METABOLIC PNL TOTAL CA: CPT

## 2022-06-16 PROCEDURE — 84156 ASSAY OF PROTEIN URINE: CPT

## 2022-06-16 NOTE — TELEPHONE ENCOUNTER
ISSUE: elevated creatinine    ----- Message from Francisco Jordan MD sent at 6/16/2022  1:37 PM CDT -----  Hold lasix. Arrange for IV fluids 1L NS twice weekly for next 2 weeks, U/A, Ucx, ultrasound kidney transplant, book her for a biopsy next week just in case Scr doesn't downtrend    OUTCOME:  Patient V/u of holding lasix  Imaging scheduling phone number provided to patient to scheduled US.    Spoke with [facility] charge, [facility] scheduling to reach out to patient to confirm time of apt for IVF tomorrow 6/17 and to set up the remaining 3 infusions.    Marivel Marcelo RN   Transplant Coordinator  [phone]    ADDENDUM:  Renal transplant biopsy tentatively scheduled for 6/23/2022 0930 arrival for 1100, home covid test 1-2 days prior to procedure.    CSA: 132 on 6/16/2022, above goal  on dose 125 mg bid  Confirms accurate trough level and v/u of decrease dose to 100 mg bid.    Marivel Marcelo RN   Transplant Coordinator  [phone]

## 2022-06-17 ENCOUNTER — HOSPITAL ENCOUNTER (OUTPATIENT)
Facility: CLINIC | Age: 67
End: 2022-06-17
Admitting: INTERNAL MEDICINE
Payer: MEDICARE

## 2022-06-17 ENCOUNTER — INFUSION THERAPY VISIT (OUTPATIENT)
Dept: INFUSION THERAPY | Facility: CLINIC | Age: 67
End: 2022-06-17
Attending: INTERNAL MEDICINE
Payer: MEDICARE

## 2022-06-17 VITALS
OXYGEN SATURATION: 99 % | RESPIRATION RATE: 16 BRPM | HEART RATE: 54 BPM | SYSTOLIC BLOOD PRESSURE: 106 MMHG | DIASTOLIC BLOOD PRESSURE: 66 MMHG | TEMPERATURE: 97.6 F

## 2022-06-17 DIAGNOSIS — Z92.29 PERSONAL HISTORY OF OTHER DRUG THERAPY: ICD-10-CM

## 2022-06-17 DIAGNOSIS — M81.0 AGE-RELATED OSTEOPOROSIS WITHOUT CURRENT PATHOLOGICAL FRACTURE: ICD-10-CM

## 2022-06-17 DIAGNOSIS — R79.89 ELEVATED SERUM CREATININE: Primary | ICD-10-CM

## 2022-06-17 DIAGNOSIS — M81.0 SENILE OSTEOPOROSIS: ICD-10-CM

## 2022-06-17 DIAGNOSIS — Z94.0 KIDNEY REPLACED BY TRANSPLANT: ICD-10-CM

## 2022-06-17 LAB
ANION GAP SERPL CALCULATED.3IONS-SCNC: 11 MMOL/L (ref 3–14)
BUN SERPL-MCNC: 23 MG/DL (ref 7–30)
CALCIUM SERPL-MCNC: 8.4 MG/DL (ref 8.5–10.1)
CHLORIDE BLD-SCNC: 109 MMOL/L (ref 94–109)
CO2 SERPL-SCNC: 21 MMOL/L (ref 20–32)
CREAT SERPL-MCNC: 1.5 MG/DL (ref 0.52–1.04)
GFR SERPL CREATININE-BSD FRML MDRD: 38 ML/MIN/1.73M2
GLUCOSE BLD-MCNC: 139 MG/DL (ref 70–99)
POTASSIUM BLD-SCNC: 4.2 MMOL/L (ref 3.4–5.3)
SODIUM SERPL-SCNC: 141 MMOL/L (ref 133–144)

## 2022-06-17 PROCEDURE — 36415 COLL VENOUS BLD VENIPUNCTURE: CPT

## 2022-06-17 PROCEDURE — 258N000003 HC RX IP 258 OP 636: Performed by: INTERNAL MEDICINE

## 2022-06-17 PROCEDURE — 999N000128 HC STATISTIC PERIPHERAL IV START W/O US GUIDANCE

## 2022-06-17 PROCEDURE — 82310 ASSAY OF CALCIUM: CPT

## 2022-06-17 PROCEDURE — 96360 HYDRATION IV INFUSION INIT: CPT

## 2022-06-17 RX ORDER — HEPARIN SODIUM (PORCINE) LOCK FLUSH IV SOLN 100 UNIT/ML 100 UNIT/ML
5 SOLUTION INTRAVENOUS
Status: CANCELLED | OUTPATIENT
Start: 2022-06-17

## 2022-06-17 RX ORDER — EPINEPHRINE 1 MG/ML
0.3 INJECTION, SOLUTION INTRAMUSCULAR; SUBCUTANEOUS EVERY 5 MIN PRN
Status: CANCELLED | OUTPATIENT
Start: 2022-06-17

## 2022-06-17 RX ORDER — ALBUTEROL SULFATE 90 UG/1
1-2 AEROSOL, METERED RESPIRATORY (INHALATION)
Status: CANCELLED
Start: 2022-06-17

## 2022-06-17 RX ORDER — MEPERIDINE HYDROCHLORIDE 25 MG/ML
25 INJECTION INTRAMUSCULAR; INTRAVENOUS; SUBCUTANEOUS EVERY 30 MIN PRN
Status: CANCELLED | OUTPATIENT
Start: 2022-06-17

## 2022-06-17 RX ORDER — ALBUTEROL SULFATE 0.83 MG/ML
2.5 SOLUTION RESPIRATORY (INHALATION)
Status: CANCELLED | OUTPATIENT
Start: 2022-06-17

## 2022-06-17 RX ORDER — HEPARIN SODIUM,PORCINE 10 UNIT/ML
5 VIAL (ML) INTRAVENOUS
Status: CANCELLED | OUTPATIENT
Start: 2022-06-17

## 2022-06-17 RX ORDER — METHYLPREDNISOLONE SODIUM SUCCINATE 125 MG/2ML
125 INJECTION, POWDER, LYOPHILIZED, FOR SOLUTION INTRAMUSCULAR; INTRAVENOUS
Status: CANCELLED
Start: 2022-06-17

## 2022-06-17 RX ORDER — DIPHENHYDRAMINE HYDROCHLORIDE 50 MG/ML
50 INJECTION INTRAMUSCULAR; INTRAVENOUS
Status: CANCELLED
Start: 2022-06-17

## 2022-06-17 RX ORDER — NALOXONE HYDROCHLORIDE 0.4 MG/ML
0.2 INJECTION, SOLUTION INTRAMUSCULAR; INTRAVENOUS; SUBCUTANEOUS
Status: CANCELLED | OUTPATIENT
Start: 2022-06-17

## 2022-06-17 RX ADMIN — SODIUM CHLORIDE 1000 ML: 9 INJECTION, SOLUTION INTRAVENOUS at 10:52

## 2022-06-17 ASSESSMENT — PAIN SCALES - GENERAL: PAINLEVEL: NO PAIN (0)

## 2022-06-17 NOTE — PATIENT INSTRUCTIONS
Dear Elizabeth Palma    Thank you for choosing [facility] ([facility]) for your infusion.  The following information is a summary of our appointment as well as important reminders.      We look forward in seeing you on your next appointment here at [facility] ([facility]).  Please don t hesitate to call us at [phone] to reschedule any of your appointments or to speak with one of the [facility] registered nurses.  It was a pleasure taking care of you today.    Sincerely,    [facility]  [facility]  [address] [place] [address]  [address]  Phone:  [phone]   
0 = independent

## 2022-06-17 NOTE — LETTER
6/17/2022         RE: Elizabeth Palma  [address]  [address]  [place] [address]        Dear Colleague,    Thank you for referring your patient, Elizabeth Palma, to the [facility]. Please see a copy of my visit note below.    Infusion Nursing Note:  Elizabeth Palma presents today for IVF.    Patient seen by provider today: No   present during visit today: Not Applicable.    Intravenous Access:  Per[facility] IV [facility] by [facility]  Labs drawn post infusion per orders    Treatment Conditions:  Not Applicable.    Post Infusion Assessment:  Patient tolerated infusion without incident.  Blood return noted pre and post infusion.  Site patent and intact, free from redness, edema or discomfort.  No evidence of extravasations.  Access discontinued per protocol.     Discharge Plan:   Discharge instructions reviewed with: Patient.  Patient and/or family verbalized understanding of discharge instructions and all questions answered.  AVS to patient via NewvemT.  Patient will return Tuesday for next appointment.   Patient discharged in stable condition accompanied by: spouse (in lobby)  Departure Mode: Ambulatory.    Administrations This Visit     0.9% sodium chloride BOLUS     Admin Date  06/17/2022 Action  New Bag Dose  1,000 mL Route  Intravenous Administered By  Hermelinda Reyes, RN              Hermelinda Reyes, RN                        Again, thank you for allowing me to participate in the care of your patient.        Sincerely,        [facility]

## 2022-06-17 NOTE — LETTER
Date:June 17, 2022      Provider requested that no letter be sent. Do not send.       [facility]

## 2022-06-17 NOTE — PROGRESS NOTES
Infusion Nursing Note:  Elizabeth Palma presents today for IVF.    Patient seen by provider today: No   present during visit today: Not Applicable.    Intravenous Access:  Per[facility] IV [facility] by [facility]  Labs drawn post infusion per orders    Treatment Conditions:  Not Applicable.    Post Infusion Assessment:  Patient tolerated infusion without incident.  Blood return noted pre and post infusion.  Site patent and intact, free from redness, edema or discomfort.  No evidence of extravasations.  Access discontinued per protocol.     Discharge Plan:   Discharge instructions reviewed with: Patient.  Patient and/or family verbalized understanding of discharge instructions and all questions answered.  AVS to patient via ZipongoT.  Patient will return Tuesday for next appointment.   Patient discharged in stable condition accompanied by: spouse (in lobby)  Departure Mode: Ambulatory.    Administrations This Visit     0.9% sodium chloride BOLUS     Admin Date  06/17/2022 Action  New Bag Dose  1,000 mL Route  Intravenous Administered By  Hermelinda Reyes, RN              Hermelidna Reyes, RN

## 2022-06-20 ENCOUNTER — MYC MEDICAL ADVICE (OUTPATIENT)
Dept: INTERVENTIONAL RADIOLOGY/VASCULAR | Facility: CLINIC | Age: 67
End: 2022-06-20
Payer: MEDICARE

## 2022-06-20 NOTE — TELEPHONE ENCOUNTER
Spoke with patient and  on phone.  Repeated pre-procedure instructions and holding of pertinent medications.  Patient verbalizes understanding of pre-procedure plan, npo requirements.  Provided phone number for follow up questions if needed.      Xin Nails RN on 6/20/2022 at 9:52 AM

## 2022-06-21 ENCOUNTER — INFUSION THERAPY VISIT (OUTPATIENT)
Dept: INFUSION THERAPY | Facility: CLINIC | Age: 67
End: 2022-06-21
Attending: INTERNAL MEDICINE
Payer: MEDICARE

## 2022-06-21 ENCOUNTER — ANCILLARY PROCEDURE (OUTPATIENT)
Dept: ULTRASOUND IMAGING | Facility: CLINIC | Age: 67
End: 2022-06-21
Attending: INTERNAL MEDICINE
Payer: MEDICARE

## 2022-06-21 VITALS
TEMPERATURE: 98.4 F | OXYGEN SATURATION: 99 % | RESPIRATION RATE: 16 BRPM | DIASTOLIC BLOOD PRESSURE: 84 MMHG | SYSTOLIC BLOOD PRESSURE: 128 MMHG | HEART RATE: 54 BPM

## 2022-06-21 DIAGNOSIS — Z94.0 KIDNEY REPLACED BY TRANSPLANT: ICD-10-CM

## 2022-06-21 DIAGNOSIS — R79.89 ELEVATED SERUM CREATININE: ICD-10-CM

## 2022-06-21 DIAGNOSIS — M81.0 AGE-RELATED OSTEOPOROSIS WITHOUT CURRENT PATHOLOGICAL FRACTURE: Primary | ICD-10-CM

## 2022-06-21 DIAGNOSIS — Z92.29 PERSONAL HISTORY OF OTHER DRUG THERAPY: ICD-10-CM

## 2022-06-21 DIAGNOSIS — M81.0 SENILE OSTEOPOROSIS: ICD-10-CM

## 2022-06-21 LAB
ANION GAP SERPL CALCULATED.3IONS-SCNC: 9 MMOL/L (ref 3–14)
BUN SERPL-MCNC: 15 MG/DL (ref 7–30)
CALCIUM SERPL-MCNC: 7 MG/DL (ref 8.5–10.1)
CHLORIDE BLD-SCNC: 118 MMOL/L (ref 94–109)
CO2 SERPL-SCNC: 19 MMOL/L (ref 20–32)
CREAT SERPL-MCNC: 1.08 MG/DL (ref 0.52–1.04)
GFR SERPL CREATININE-BSD FRML MDRD: 57 ML/MIN/1.73M2
GLUCOSE BLD-MCNC: 142 MG/DL (ref 70–99)
POTASSIUM BLD-SCNC: 4.2 MMOL/L (ref 3.4–5.3)
SODIUM SERPL-SCNC: 146 MMOL/L (ref 133–144)

## 2022-06-21 PROCEDURE — 96360 HYDRATION IV INFUSION INIT: CPT

## 2022-06-21 PROCEDURE — 36415 COLL VENOUS BLD VENIPUNCTURE: CPT

## 2022-06-21 PROCEDURE — 258N000003 HC RX IP 258 OP 636: Performed by: INTERNAL MEDICINE

## 2022-06-21 PROCEDURE — 82310 ASSAY OF CALCIUM: CPT

## 2022-06-21 PROCEDURE — 76776 US EXAM K TRANSPL W/DOPPLER: CPT | Mod: GC | Performed by: RADIOLOGY

## 2022-06-21 RX ORDER — METHYLPREDNISOLONE SODIUM SUCCINATE 125 MG/2ML
125 INJECTION, POWDER, LYOPHILIZED, FOR SOLUTION INTRAMUSCULAR; INTRAVENOUS
Status: CANCELLED
Start: 2022-06-21

## 2022-06-21 RX ORDER — ALBUTEROL SULFATE 0.83 MG/ML
2.5 SOLUTION RESPIRATORY (INHALATION)
Status: CANCELLED | OUTPATIENT
Start: 2022-06-21

## 2022-06-21 RX ORDER — HEPARIN SODIUM (PORCINE) LOCK FLUSH IV SOLN 100 UNIT/ML 100 UNIT/ML
5 SOLUTION INTRAVENOUS
Status: CANCELLED | OUTPATIENT
Start: 2022-06-21

## 2022-06-21 RX ORDER — EPINEPHRINE 1 MG/ML
0.3 INJECTION, SOLUTION INTRAMUSCULAR; SUBCUTANEOUS EVERY 5 MIN PRN
Status: CANCELLED | OUTPATIENT
Start: 2022-06-21

## 2022-06-21 RX ORDER — NALOXONE HYDROCHLORIDE 0.4 MG/ML
0.2 INJECTION, SOLUTION INTRAMUSCULAR; INTRAVENOUS; SUBCUTANEOUS
Status: CANCELLED | OUTPATIENT
Start: 2022-06-21

## 2022-06-21 RX ORDER — MEPERIDINE HYDROCHLORIDE 25 MG/ML
25 INJECTION INTRAMUSCULAR; INTRAVENOUS; SUBCUTANEOUS EVERY 30 MIN PRN
Status: CANCELLED | OUTPATIENT
Start: 2022-06-21

## 2022-06-21 RX ORDER — HEPARIN SODIUM,PORCINE 10 UNIT/ML
5 VIAL (ML) INTRAVENOUS
Status: CANCELLED | OUTPATIENT
Start: 2022-06-21

## 2022-06-21 RX ORDER — ALBUTEROL SULFATE 90 UG/1
1-2 AEROSOL, METERED RESPIRATORY (INHALATION)
Status: CANCELLED
Start: 2022-06-21

## 2022-06-21 RX ORDER — DIPHENHYDRAMINE HYDROCHLORIDE 50 MG/ML
50 INJECTION INTRAMUSCULAR; INTRAVENOUS
Status: CANCELLED
Start: 2022-06-21

## 2022-06-21 RX ADMIN — SODIUM CHLORIDE 1000 ML: 9 INJECTION, SOLUTION INTRAVENOUS at 15:56

## 2022-06-21 ASSESSMENT — PAIN SCALES - GENERAL: PAINLEVEL: MODERATE PAIN (4)

## 2022-06-21 NOTE — PATIENT INSTRUCTIONS
Dear Elizabeth Palma    Thank you for choosing [facility] ([facility]) for your infusion.  The following information is a summary of our appointment as well as important reminders.      We look forward in seeing you on your next appointment here at [facility] ([facility]).  Please don t hesitate to call us at [phone] to reschedule any of your appointments or to speak with one of the [facility] registered nurses.  It was a pleasure taking care of you today.    Sincerely,    [facility]  [address] [place] [address]  [address]  Phone:  [phone]

## 2022-06-21 NOTE — PROGRESS NOTES
Nursing Note  Elizabeth Palma [facility] for:   Chief Complaint   Patient presents with     Infusion     IV fluids     During today's [facility] appointment, orders from Dr Jordan were completed.  Frequency: as needed    Progress note:  Patient identification verified by name and date of birth.  Assessment completed.  Vitals recorded in Doc Flowsheets.  Patient was provided with education regarding medication/procedure and possible side effects.  Patient verbalized understanding.    Treatment Conditions: Non-applicable.    Premedications: were not ordered.    Infusion length and rate:  infusion given over approximately 1 hours and 20 min (ran at 800 ml/hr due to patient discomfort with IV)    Labs: were drawn per orders.     Vascular access: peripheral IV placed today.    Is the next appt scheduled? yes  Asymptomatic COVID test completed? no    Post Infusion Assessment:  Patient tolerated infusion without incident.  Site patent and intact, free from redness, edema or discomfort.  Access discontinued per protocol.     Discharge Plan:   Follow up plan of care with: ongoing infusions at [facility]., transplant coordinator. and ordering provider as scheduled.  Discharge instructions were reviewed with patient.  Patient/representative verbalized understanding of discharge instructions and all questions answered.  Patient discharged from [facility] in stable condition.    Francie Payton RN    Administrations This Visit     0.9% sodium chloride BOLUS     Admin Date  06/21/2022 Action  New Bag Dose  1,000 mL Route  Intravenous Administered By  Francie Payton RN           Admin Date  06/21/2022 Action  Restarted Dose   Route  Intravenous Administered By  Francie Payton RN                BP (!) 141/88   Pulse 50   Temp 98.4  F (36.9  C)  (Oral)   Resp 16   LMP  (LMP Unknown)   SpO2 99%

## 2022-06-21 NOTE — LETTER
6/21/2022         RE: Elizabeth Palma  [address]  [address]  [facility] [address]        Dear Colleague,    Thank you for referring your patient, Elizabeth Palma, to the [facility] TREATMENT [facility]. Please see a copy of my visit note below.    Nursing Note  Elizabeth Palma [facility] for:   Chief Complaint   Patient presents with     Infusion     IV fluids     During today's [facility] appointment, orders from Dr Jordan were completed.  Frequency: as needed    Progress note:  Patient identification verified by name and date of birth.  Assessment completed.  Vitals recorded in Doc Flowsheets.  Patient was provided with education regarding medication/procedure and possible side effects.  Patient verbalized understanding.    Treatment Conditions: Non-applicable.    Premedications: were not ordered.    Infusion length and rate:  infusion given over approximately 1 hours and 20 min (ran at 800 ml/hr due to patient discomfort with IV)    Labs: were drawn per orders.     Vascular access: peripheral IV placed today.    Is the next appt scheduled? yes  Asymptomatic COVID test completed? no    Post Infusion Assessment:  Patient tolerated infusion without incident.  Site patent and intact, free from redness, edema or discomfort.  Access discontinued per protocol.     Discharge Plan:   Follow up plan of care with: ongoing infusions at [facility]., transplant coordinator. and ordering provider as scheduled.  Discharge instructions were reviewed with patient.  Patient/representative verbalized understanding of discharge instructions and all questions answered.  Patient discharged from [facility] in stable condition.    Francie Payton RN    Administrations This Visit     0.9% sodium chloride BOLUS     Admin Date  06/21/2022 Action  New Bag  Dose  1,000 mL Route  Intravenous Administered By  Francie Payton RN           Admin Date  06/21/2022 Action  Restarted Dose   Route  Intravenous Administered By  Francie Payton RN                BP (!) 141/88   Pulse 50   Temp 98.4  F (36.9  C) (Oral)   Resp 16   LMP  (LMP Unknown)   SpO2 99%         Again, thank you for allowing me to participate in the care of your patient.        Sincerely,        [facility]

## 2022-06-22 ENCOUNTER — TELEPHONE (OUTPATIENT)
Dept: TRANSPLANT | Facility: CLINIC | Age: 67
End: 2022-06-22

## 2022-06-22 DIAGNOSIS — Z94.0 KIDNEY REPLACED BY TRANSPLANT: Primary | ICD-10-CM

## 2022-06-22 DIAGNOSIS — E21.3 HYPERPARATHYROIDISM (H): ICD-10-CM

## 2022-06-22 RX ORDER — FUROSEMIDE 20 MG
20 TABLET ORAL DAILY
Qty: 30 TABLET | Refills: 11 | Status: ON HOLD | OUTPATIENT
Start: 2022-06-22 | End: 2022-06-29

## 2022-06-22 NOTE — TELEPHONE ENCOUNTER
ISSUE: Patient's Creatinine trending down with IVF, biopsy scheduled tomorrow.    PLAN:  Francisco Jordan MD Sveiven, Sara, RN      Cancel biopsy for now, please repeat labs this week including vitamin D level, ionized calcium. I'm not sure I believe the lab.              OUTCOME: Patient v/u that biopsy will be cancelled for tomorrow.  Patient will                     resume eliquis.               Patient reports BLE swelling and weight gain of ~5lb.  Shoes are tight.  Lasix held                6/16 for elevated creatinine.      Francisco Jordan MD Sveiven, Sara, RN  Hold off on IVF, restart lasix at 20mg daily. Cancel biopsy, would get labs Friday or Monday     Spoke with patient and her .  Both v/u of starting lasix 20 mg daily.  IVF cancelled.  Labs scheduled Monday 5/27/2022    Marivel Marcelo RN   Transplant Coordinator  [phone]

## 2022-06-23 DIAGNOSIS — E11.42 TYPE 2 DIABETES MELLITUS WITH DIABETIC POLYNEUROPATHY, WITHOUT LONG-TERM CURRENT USE OF INSULIN (H): ICD-10-CM

## 2022-06-24 ENCOUNTER — PRE VISIT (OUTPATIENT)
Dept: NEUROLOGY | Facility: CLINIC | Age: 67
End: 2022-06-24

## 2022-06-24 ENCOUNTER — TELEPHONE (OUTPATIENT)
Dept: INTERNAL MEDICINE | Facility: CLINIC | Age: 67
End: 2022-06-24

## 2022-06-24 ENCOUNTER — OFFICE VISIT (OUTPATIENT)
Dept: NEUROLOGY | Facility: CLINIC | Age: 67
End: 2022-06-24
Attending: INTERNAL MEDICINE
Payer: MEDICARE

## 2022-06-24 VITALS
DIASTOLIC BLOOD PRESSURE: 115 MMHG | HEART RATE: 51 BPM | BODY MASS INDEX: 35.71 KG/M2 | OXYGEN SATURATION: 100 % | WEIGHT: 189 LBS | SYSTOLIC BLOOD PRESSURE: 139 MMHG | RESPIRATION RATE: 16 BRPM

## 2022-06-24 DIAGNOSIS — G21.19 OTHER DRUG-INDUCED SECONDARY PARKINSONISM (H): ICD-10-CM

## 2022-06-24 DIAGNOSIS — R29.6 RECURRENT FALLS: ICD-10-CM

## 2022-06-24 DIAGNOSIS — E11.42 DIABETIC POLYNEUROPATHY ASSOCIATED WITH TYPE 2 DIABETES MELLITUS (H): ICD-10-CM

## 2022-06-24 DIAGNOSIS — G25.0 ESSENTIAL TREMOR: ICD-10-CM

## 2022-06-24 DIAGNOSIS — R25.9 MIXED ACTION AND RESTING TREMOR: ICD-10-CM

## 2022-06-24 DIAGNOSIS — G24.3 CERVICAL DYSTONIA: Primary | ICD-10-CM

## 2022-06-24 DIAGNOSIS — Z82.0 FAMILY HISTORY OF BENIGN ESSENTIAL TREMOR: ICD-10-CM

## 2022-06-24 DIAGNOSIS — R26.89 POOR BALANCE: ICD-10-CM

## 2022-06-24 PROCEDURE — 99215 OFFICE O/P EST HI 40 MIN: CPT | Mod: GC | Performed by: PSYCHIATRY & NEUROLOGY

## 2022-06-24 PROCEDURE — 99417 PROLNG OP E/M EACH 15 MIN: CPT | Mod: GC | Performed by: PSYCHIATRY & NEUROLOGY

## 2022-06-24 RX ORDER — FLUTICASONE PROPIONATE 220 UG/1
1 AEROSOL, METERED RESPIRATORY (INHALATION)
COMMUNITY

## 2022-06-24 ASSESSMENT — UNIFIED PARKINSONS DISEASE RATING SCALE (UPDRS)
SPONTANEITY_OF_MOVEMENT: 1: SLIGHT: SLIGHT GLOBAL SLOWNESS AND POVERTY OF SPONTANEOUS MOVEMENTS.
ARISING_CHAIR: SLIGHT: ARISING IS SLOWER THAN NORMAL, OR MAY NEED MORE THAN ONE ATTEMPT, OR MAY NEED TO MOVE FORWARD IN THE CHAIR TO ARISE.  NO NEED TO USE THE ARMS OF THE CHAIR.
PRONATION_SUPINATION_RIGHT: NORMAL
HANDMOVEMENTS_LEFT: NORMAL
POSTURE: 0 NORMAL, NO PROBLEMS
TOTAL_SCORE_LEFT: 4
SPEECH: NORMAL
LEG_AGILITY_LEFT: NORMAL
FINGER_TAPPING_LEFT: SLIGHT: ANY OF THE FOLLOWING: A) THE REGULAR RHYTHM IS BROKEN WITH ONE WITH ONE OR TWO INTERRUPTIONS OR HESITATIONS OF THE MOVEMENT B) SLIGHT SLOWING C) THE AMPLITUDE DECREMENTS NEAR THE END OF THE 10 MOVEMENTS.
FINGER_TAPPING_RIGHT: SLIGHT: ANY OF THE FOLLOWING: A) THE REGULAR RHYTHM IS BROKEN WITH ONE WITH ONE OR TWO INTERRUPTIONS OR HESITATIONS OF THE MOVEMENT B) SLIGHT SLOWING C) THE AMPLITUDE DECREMENTS NEAR THE END OF THE 10 MOVEMENTS.
AMPLITUDE_LUE: SLIGHT: < 1 CM IN MAXIMAL AMPLITUDE.
GAIT: SLIGHT: INDEPENDENT WALKING WITH MINOR GAIT IMPAIRMENT.
CONSTANCY_TREMOR_ATREST: NORMAL: NO TREMOR.
TOETAPPING_LEFT: NORMAL
AMPLITUDE_LIP_JAW: NORMAL: NO TREMOR.
PRONATION_SUPINATION_LEFT: NORMAL
FACIAL_EXPRESSION: NORMAL.
TOETAPPING_RIGHT: SLIGHT: ANY OF THE FOLLOWING: A) THE REGULAR RHYTHM IS BROKEN WITH ONE WITH ONE OR TWO INTERRUPTIONS OR HESITATIONS OF THE MOVEMENT B) SLIGHT SLOWING C) THE AMPLITUDE DECREMENTS NEAR THE END OF THE 10 MOVEMENTS.
RIGIDITY_LUE: NORMAL
FREEZING_GAIT: NORMAL
LEG_AGILITY_RIGHT: NORMAL
RIGIDITY_RUE: NORMAL
AMPLITUDE_RUE: SLIGHT: < 1 CM IN MAXIMAL AMPLITUDE.
TOTAL_SCORE: 7
RIGIDITY_LLE: NORMAL
RIGIDITY_NECK: NORMAL
HANDMOVEMENTS_RIGHT: NORMAL
RIGIDITY_RLE: NORMAL
AMPLITUDE_RLE: NORMAL: NO TREMOR.
AMPLITUDE_LLE: NORMAL: NO TREMOR.

## 2022-06-24 NOTE — TELEPHONE ENCOUNTER
Called [facility] and gave verbal orders per Dr. Sheridan for PT: to move discharge visit to next week.      Bertin Doherty CMA ([facility]) at 1:11 PM on 6/24/2022

## 2022-06-24 NOTE — PROGRESS NOTES
"Department of Neurology  Movement Disorders Division     Patient: Elizabeth Palma   MRN: [id number]   : 1955   Date of Visit: 2022    Dear Colleague,     Thank you for referring your patient, Ms. Palma, to the [facility] NEUROLOGY at [facility]. Please see a copy of my visit note below.    Referring Provider: Dr Horacio Sheridan @ [facility]    History of Present Illness  Ms. Palma is a 66 year old R handed female with PMH of asthma, DM, DM neuropathy, ADPKD s/p kidney transplant on an immunosuppressant that presents to Neurology Movement clinic as a new patient for evaluation of tremor.    Patient seen by Dr Horacio Sheridan @ [facility] on 22 and notes \"Essential tremor - she is doing better but her heart rate is in the low 50s and she has orthostatic hypotension symptoms.  The lightheadedness is fleeting and is only when she goes from a sitting to a standing position.  Her  is present and helps her with this change in position.\" She was noted to have improving symptoms from COVID pneumonia for which she was hospitalized 8 days and and at that visit \"with no residual lung function other than occasional cough. She does have some memory difficulties which she points to as the onset of COVID.\"  Patient seen by  @ [facility] on 21  Patient seen by Dr. Borja @ [facility] on 19    History obtained from patient. Patient accompanied by Rahat, .   Initial symptom/side:   Initial symptoms began in hands a couple years ago, R > L. Tremors in hands worsened after COVID infection a couple month ago. Now tremors in head (no-no head shaking) associated with neck pain and whole body. Prior to COVID infection tremors in hands were not as severe and she didn't have tremor any where else. Hard to use her phone. She may have jerking movements in legs and arms. She has a hard time getting in and out of car because she can't get her " "legs up; needs a step stool.     Current treatment for tremors: Propranolol started prior to COVID infection which improve tremor in hands but didn't take it away.   Previous treatment for tremors:     Exacerbating factors: Denies.     Relieving factors: relaxed, meditating, concentrating on tremor to stop. Head shaking is constant and stop when she is asleep but is constant through out the day.   Does alcohol relieve symptoms: doesn't drink alcohol   Caffeine intake: doesn't drink caffeine   Change in handwriting: \"Very bad,\" sloppy though legible. Denies micrographia.   Resting tremor: Shaking in head may keep her awake. She has to meditate for tremor to go away. No resting tremor in hands.   Bradykinesia: Slower to to perform fine motor activities, like crocheting.   Rigidity: \"All over.\" All new sine COVID.   Gait problem: Uses a roller walk since falling out of bed from hospital stay from COVID pneumonia. Now is afraid of falling when walking. She walks at home without the walk without issues.  Balance/Falls: See above. Last fell on Monday while on a boat cruise and while walking up hill and was using a cane; uncertain why she fell. Denies sequelae.   Numbness/Tingling: reports \"a little bit of neuropathy in bottoms of feet.\"    Memory problems: Memory is worse since COVID but \"most of it is coming back.\" Rahat found her wandering in the dark the day after she came back from the hospital stay from COVID pneumonia.   Mood issues: \"Pretty good.\" Takes ativan prn, about once a month. Takes Abilify x 3-4 years to help with mood which helps.  History of dopamine depleting therapies: see above  Sleep issues/Dream enactment: \"Fair.\" Sleeps 6.5 hours. Takes 1 nap in the day about 1 hour.   Sense of smell: \"Good.\"  Constipation: daily BMs  Speech: no issues  Facial expression: no issues   Swallowing: no issues  Family Hx of tremor: Mother with tremors in hands and head.  Takes gabapentin for neuropathy.  She has asthma " and is treated with levalbuteral prn daily; last used yesterday. Been using more this year due to pollen.  Takes cyclosporin and mycophenolate after getting a kidney transplant 8.5 years ago. Take topirimate x 3 years for appetite control.     Trying to move ASAP to [place].     TETRAS ADL Subscale (48 max)  (0=Normal 1=Slightly abnormal 2=Mildly 3=Moderately 4= Severely)      Movement Disorder-related Medications                   AM Mid afternoon At bedtime      Propranolol 20 mg 2 1 1     Gabapentin 300 mg   2     topirmate 200 1  1         Review of Systems:  Other than that mentioned above, the remainder of 12 systems reviewed were negative.    Past Medical History:   Diagnosis Date     Abnormal MRI, cervical spine 10/15/2011    2011; mild changes noted. Study done for left arm symptoms Impression:  1. Mild multilevel degenerative disc disease with no significant canal or neural stenosis seen. motion artifact on the STIR images in these are not interpretable. The remaining images were interpreted      Autosomal dominant polycystic kidney disease 2011     (Problem list name updated by automated process. Provider to review and confirm.)     [facility] [place]carpomet[place] disease[place] localized primary 2013     -donor kidney transplant 2014     Gastroesophageal reflux disease      Generalized anxiety disorder 11/15/2012     Glaucoma      Hyperlipidemia 10/15/2011     Hyperparathyroidism, secondary (H) 2015     Hypertension     resolved     Immunosuppressed status (H) 2014     Major depressive disorder, recurrent episode, moderate (H) 11/15/2012     Obesity (BMI 30-39.9)      OP (osteoporosis) T score -3.8 2009 T-score -3.7      LION (obstructive sleep apnoea) 10/15/2012    reported intolerant to CPAP -- she says she doesn't have LION     Pain in joint, forearm -- L unhealed Fx 2013     Premature menopause age 37 07/10/2012    OCP (vaginal  bldg)-->HT which she stopped 2 mo later documented at Jan 12, 2007 visit (age 51).      Restless leg syndrome      Stiffness of joint, not elsewhere classified, hand 03/05/2013     Tremor 10/15/2011    head     Type 2 DM with Neuropathy 1985    started with gestational diabetes     Uncomplicated asthma      Past Surgical History:   Procedure Laterality Date     ABDOMEN SURGERY       ANKLE SURGERY       C/SECTION, LOW TRANSVERSE      x 2     CHOLECYSTECTOMY  1990     COLONOSCOPY       COLONOSCOPY N/A 6/8/2022    Procedure: COLONOSCOPY, WITH POLYPECTOMY;  Surgeon: Leventhal, Thomas Michael, MD;  Location: [facility] OR     ESOPHAGOSCOPY, GASTROSCOPY, DUODENOSCOPY (EGD), COMBINED N/A 05/19/2015    Procedure: COMBINED ESOPHAGOSCOPY, GASTROSCOPY, DUODENOSCOPY (EGD);  Surgeon: Sky Davey MD;  Location:  GI     ESOPHAGOSCOPY, GASTROSCOPY, DUODENOSCOPY (EGD), COMBINED N/A 05/19/2015    Procedure: COMBINED ESOPHAGOSCOPY, GASTROSCOPY, DUODENOSCOPY (EGD), BIOPSY SINGLE OR MULTIPLE;  Surgeon: Sky Davey MD;  Location:  GI     EYE SURGERY       LAPAROSCOPY, SURGICAL; REPAIR INCISIONAL OR VENTRAL HERNIA       LASER SURGERY OF EYE Left 10/01/2020    sever vitreous strands     ORTHOPEDIC SURGERY       HERMINIA EN Y BOWEL  1990     WRIST SURGERY       ZZC TRANSPLANTATION OF KIDNEY  03/2014     Family History   Problem Relation Age of Onset     Hyperlipidemia Mother      Diabetes Mother      Hypertension Mother      Genetic Disorder Father      Mental Illness Father      Diabetes Father      Hypertension Father      Mental Illness Other         family hx     Heart Disease Other      Diabetes Other      Cancer Other      Mental Illness Other      Diabetes Other      Glaucoma No family hx of      Macular Degeneration No family hx of      SH:   -Parents/Family/Living situation: lives in a Condo with Rahat, looking to move to [place]  -Children: 2 grown men (one in [place], other in [place])   -Marital:  43  years   -Work: teacher in the afternoons, teaches Khmer   -Tobacco: deneis  -Alcohol: denies  -Illicit substances: denies     Medications:  Current Outpatient Medications   Medication Sig Dispense Refill     acetaminophen (TYLENOL) 500 MG tablet Take 1,500 mg by mouth every 6 hours as needed for mild pain       acetylcysteine (N-ACETYL CYSTEINE) 600 MG CAPS capsule Take 1 capsule (600 mg) by mouth 2 times daily (Patient does not know what strength they are taking.) 60 capsule 3     apixaban ANTICOAGULANT (ELIQUIS) 5 MG tablet Take 1 tablet (5 mg) by mouth 2 times daily       ARIPiprazole (ABILIFY) 2 MG tablet Take 1.5 tablets (3 mg) by mouth 2 times daily 90 tablet 2     ascorbic acid (VITAMIN C) 250 MG CHEW chewable tablet Take 2 tablets (500 mg) by mouth daily       beclomethasone HFA (QVAR REDIHALER) 40 MCG/ACT inhaler Inhale 1 puff into the lungs 2 times daily       cycloSPORINE modified (GENERIC EQUIVALENT) 25 MG capsule Take 5 capsules (125 mg) by mouth 2 times daily TAKE 5 CAPSULES (125MG) BY MOUTH TWO TIMES A  capsule 11     denosumab (PROLIA) 60 MG/ML SOSY injection Inject 60 mg Subcutaneous       ferrous sulfate (FEROSUL) 325 (65 Fe) MG tablet Take 1 tablet (325 mg) by mouth daily (with breakfast) 100 tablet 0     fluticasone (FLONASE) 50 MCG/ACT nasal spray Spray 1 spray into both nostrils daily (Patient taking differently: Spray 1 spray into both nostrils daily as needed) 48 g 3     furosemide (LASIX) 20 MG tablet Take 1 tablet (20 mg) by mouth daily 30 tablet 11     gabapentin (NEURONTIN) 300 MG capsule Take 2 capsules (600 mg) by mouth At Bedtime 180 capsule 2     Lactobacillus (ACIDOPHILUS) 100 MG CAPS Take 100 mg by mouth daily       latanoprost (XALATAN) 0.005 % ophthalmic solution Place 1 drop into both eyes At Bedtime 7.5 mL 4     levalbuterol (XOPENEX HFA) 45 MCG/ACT inhaler Inhale 2 puffs into the lungs every 6 hours as needed for shortness of breath / dyspnea or wheezing 15 g 3      LORazepam (ATIVAN) 0.5 MG tablet Take 1 tab (0.5mg) twice weekly as need for anxiety 30 tablet 0     metFORMIN (GLUCOPHAGE-XR) 500 MG 24 hr tablet Take 3 tablets (1,500 mg) by mouth daily (with dinner) 270 tablet 1     mycophenolic acid (GENERIC EQUIVALENT) 180 MG EC tablet Take 2 tablets (360 mg) by mouth 2 times daily       mycophenolic acid (GENERIC EQUIVALENT) 180 MG EC tablet Take 3 tablets (540 mg) by mouth 2 times daily for 90 days 540 tablet 3     ondansetron (ZOFRAN-ODT) 4 MG ODT tab Take 1 tablet (4 mg) by mouth every 6 hours as needed for nausea 20 tablet 4     order for DME Walker with front wheels and a seat. 1 Units 0     pantoprazole (PROTONIX) 40 MG EC tablet Take 1 tablet (40 mg) by mouth daily 90 tablet 3     Polyvinyl Alcohol-Povidone (REFRESH OP) Apply to eye as needed Both eyes       prazosin (MINIPRESS) 5 MG capsule Take 3 x 5mg (15mg) caps + 1 x 2mg caps at bedtime (total dose=17mg) 90 capsule 3     propranolol (INDERAL) 20 MG tablet Take 2 tablets (40 mg) by mouth 2 times daily Afternoon & evening.       psyllium (METAMUCIL/KONSYL) capsule Take 5 capsules by mouth every evening With supper.       simvastatin (ZOCOR) 20 MG tablet Take 1 tablet (20 mg) by mouth At Bedtime       sulfamethoxazole-trimethoprim (BACTRIM) 400-80 MG tablet Take 1 tablet by mouth daily 90 tablet 3     topiramate (TOPAMAX) 200 MG tablet Take 1 tablet (200 mg) by mouth 2 times daily 60 tablet 11     vilazodone (VIIBRYD) 10 MG TABS tablet Take 10mg tab with 40mg tab for total daily dose of 50 mg 30 tablet 3     vilazodone (VIIBRYD) 40 MG TABS tablet Take 40mg tab with 10mg tab for total daily dose of 50 mg 30 tablet 3     fluticasone (FLOVENT HFA) 220 MCG/ACT inhaler Inhale 1 puff into the lungs             Allergies   Allergen Reactions     Percocet [Oxycodone-Acetaminophen] Nausea and Vomiting     Novocain [Procaine Hcl] Hives     Had reaction 25 years ago to old renetta. Pt reports multiple injections of lidocaine  since then without reaction.  Tolerated lidocaine injection today without difficulty.  Osmar Mark MD IR Service.         Physical Exam:  The patient's  weight is 85.7 kg (189 lb). Her blood pressure is 139/115 (abnormal) and her pulse is 51. Her respiration is 16 and oxygen saturation is 100%.    Physical Exam:  GENERAL: alert, active, attentive, appropriately groomed   HEENT: normocephalic, eyes open with no discharge, nares patent, oropharynx clear-no lesions  CHEST: non labored breathing  EXTREMITIES: no edema/cyanosis in BUE/BLE,  PSYCH: mood stable     Neurologic Exam:  MENTAL STATUS: Alert and oriented to person, place, time, and situation. Follows commands. Recent and remote memory intact. Attention span and concentration intact. Fund of knowledge intact to current events.  SPEECH: Fluent, intact comprehension and articulation  CN: visual fields intact in all fields, EOMIB, no nystagmus, normal saccades, facial sensation intact, facial movement symmetric, hearing grossly intact to conversation, tongue protrudes midline   MOTOR: Moves all extremities equally against gravity without difficulty with 4/4 ShAbd, 5/5 ElbFlex,4/4 ElbEx, 5/5 HipFlex, 5/5 KneeExt, 5/5 KneeFlex, 5/5 ADF  Involuntary movements:   no-no head shaking  Tremors in arms but seem to be radiating from head tremors   Fingers taps broken up due to tremor, mildly bradykinetic b/l   REFLEXES (R/L): 2/2  in biceps, brachioradialis, triceps, patellars, 1/1 achilles; no clonus  SENSATION: intact to light touch throughout   COORDINATION: no dysmetria with FTN, HTS  GAIT: able to slowly rise unassisted from a seated position, decreased b/l arm swing and stride length, en bloc turns, no ataxia, tremor in both hands while walking R > L    Motor (Performance) Sub-Scale 6/24/2022   Assessment Time 8:42 AM   Medication On   DBS - Right Brain None   DBS - Left Brain None   Head 2   Face & Jaw 0   Voice 0   Outstretched - RIGHT 2   Outstretched - LEFT 1.5    Wingbeating - RIGHT 2   Wingbeating - LEFT 1   Kinetic - RIGHT 1.5   Kinetic - LEFT 1   Lower Limb - RIGHT 2   Lower Limb - LEFT 1.5   Lower Limb (Max) 2   Spiral - RIGHT 2.5   Spiral - LEFT 1   Handwriting 2   Dot approx - RIGHT 2   Dot approx - LEFT 1.5   Trunk (Standing) 1   Total Right 12   Total Left 7.5   Axial 2   TOTAL 23     UPDRS Values 6/24/2022   Speech 0   Facial Expression 0   Rigidity Neck 0   Rigidity RUE 0   Rigidity LUE 0   Rigidity RLE 0   Rigidity LLE 0   Finger Taps R 1   Finger Taps L 1   Hand Mvt R 0   Hand Mvt L 0   Pron-/Supinate R 0   Pron-/Supinate L 0   Toe Tap R 1   Toe Tap L 0   Leg Agility R 0   Leg Agility L 0   Arise From Chair 1   Gait 1   Gait Freezing 0   Posture 0   Global Spont Mvt 1   Postural Tremor RUE 2   Postural Tremor LUE 1   Kinetic Tremor RUE 2   Kinetic Tremor LUE 1   Rest Tremor RUE 1   Rest Tremor LUE 1   Rest Tremor RLE 0   Rest Tremor LLE 0   Rest Tremor Lip/Jaw 0   Rest Tremor Constancy 0   Total Right 7   Total Left 4     Data Reviewed: I have personally reviewed the tests/studies below.   TSH 0.92 02/10/2022   T4 0.96 10/30/2018   CBC 6/2022 WNL  4/2022 A1c 7.3%, CMP with elevate Cr, decrease proten and alubumin    [facility]:  MRI Lumbar Spine 6/19/21     [facility]:  MRI Cervical Spine 4/22/19  MRI Brain 4/22/19    [facility]:  CT Lumbar Spine 6/18/21    Impression:  Elizabeth Palma is a 66 year old female with tremor.   We discussed several different factors that exacerbate tremor including uncontrolled emotional situations, poorly controlled depression, stress, anxiety, poor sleep, metabolic (diabetes), and electrolyte abnormalities, medication side effects(cyclosporine 12% to 55%, levalbuterol less than 2% to 6.8%, aripiprazole 2% to 11.8%, etc. We discussed several therapies for treatment of tremor including medications, therapies (OT for assistive devices).       1. Postural tremor: Essential tremor vs medication induced (cyclosporine,  aripiprazole, levalbuterol) and likely exacerbated by diabetes and recent COVID infection. Patient in tremor in hands and head; no-no head tremors are constant and most bothersome to patient as it is associated with neck pain from constant head movements. Possible cervical dystonia. She notices hand tremors mostly with action but may observe them at rest. Patient's mother with similar tremors in hands and head. Would not change dose of above meds as they are necessary to medications but is something to note. We will attempt to manage tremor with medications, however, this may be difficult due to the above factors. Will avoid treating with primidone as this may decrease efficacy of Mycophenoloate.   2. Poor balance associated with falls associated with #3  3. Diabetic neuropathy  4. Subtle signs of parkinsonism on exam may be secondary to aripiprazole: Exam with resting tremor, bradykinesia, tremor with walking, en bloc turns (could be due to neuropathy). Will continue to observe.   5. Asthma on levalbuterol     Plan:   - OT referral for assistive devices, such as weighted utensils, weighted wrist bands, etc.   - Consider decreasing or tapering off of propranolol in setting of asthma. If kept on this medication, would closely monitor for bronchospasms.   - To treat tremor, optimize gabapentin 300mg by increasing the dose. Follow instructions below:   - Week 1, take 1 capsule in the AM, continue 2 capsules in PM   - Week 2, continue 1 capsule in AM, take 1 capsule in PM,  continue 2 capsules in PM   - Stop at lowest effective dose that improve tremor.  - Places to find out more information about essential tremor and treatments are:  Essentialtremor.org  And  You can go to [email or web address] and look for patient information about tremor under the guidelines section.  - Call the clinic for questions or concerns.  - Appropriate for botulinum toxin treatment for tremor of head. We discussed benefits and potential adverse effects of  this treatment. Patient is interested and would like to proceed.   - Will submit request for prior authorization for botulinum toxin therapy from insurer.  - Schedule for injection procedure when authorization has been determined.  Muscles Injected Units Injected Number of Injections   Right splenius capitis 10 1   Right obliquus capitis, inferior 5 1   Right longissimus 5 1        Left splenius capitus 10 1   Left  obliquus capitis, inferior  5 1   Left longissiums 5 1        Predicted Total Units Injected: 40                 - Will continue to monitor symptoms of parkinsonism   - Management of DM, per Dr. Sheridan    Patient to return in 2-3 months, for in-person or virtual visit, 60 minutes.     Donna Perez DO, MA   of Neurology   [facility]     115 minutes spent on date of encounter doing chart reviews and exam and documentation and further activities as noted above.     Herson Chong, MS-4, was present for this visit for educational purposes.

## 2022-06-24 NOTE — TELEPHONE ENCOUNTER
Health Call Center    Phone Message    May a detailed message be left on voicemail: yes     Reason for Call: Order(s): Home Care Orders: Physical Therapy (PT): Patient was not home for her discharge visit upon attempt today for home care. She was at another doc appt. They will need to move discharge visit to next week. FYI. Patient reported having a fall this week with some bruising. No major injuries.      [facility] #: [phone]    Action Taken: Message routed to:  [facility] ([facility]    Travel Screening: Not Applicable

## 2022-06-24 NOTE — PATIENT INSTRUCTIONS
Plan:   - OT referral for assistive devices, such as weighted utensils, weighted wrist bands, etc.   - Consider decreasing or tapering off of propranolol   - Optimize gabapentin 300mg by increasing the dose   - Week 1, take 1 capsule in the AM, continue 2 capsules in PM   - Week 2, take 1 capsule in AM, take 1 capsule in PM,  continue 2 capsules in PM   - Stop at lowest effective dose that improve tremor.  - Places to find out more information about essential tremor and treatments are:  Essentialtremor.org  And  You can go to AAN.com and look for patient information about tremor under the guidelines section.  - Call the clinic for questions or concerns.  - Appropriate for botulinum toxin treatment for tremor of head. We discussed benefits and potential adverse effects of this treatment. Patient is interested and would like to proceed.   - Will submit request for prior authorization for botulinum toxin therapy from insurer.  - Schedule for injection procedure when authorization has been determined.  - Management of DM, per Dr. Sheridan       Patient to return in 2-3 months, for in-person or virtual visit, 60 minutes.

## 2022-06-24 NOTE — LETTER
"2022       RE: Elizabeth Palma  [address]  [address]  [facility] [address]     Dear Colleague,    Thank you for referring your patient, Elizabeth Palma, to the [facility]UROLOGY [facility]. Please see a copy of my visit note below.    Department of Neurology  Movement Disorders Division     Patient: Elizabeth Palma   MRN: [id number]   : 1955   Date of Visit: 2022    Dear Colleague,     Thank you for referring your patient, Ms. Palma, to the [facility] NEUROLOGY at [facility]. Please see a copy of my visit note below.    Referring Provider: Dr Horacio Sheridan @ [facility]    History of Present Illness  Ms. Palma is a 66 year old R handed female with PMH of asthma, DM, DM neuropathy, ADPKD s/p kidney transplant on an immunosuppressant that presents to Neurology Movement clinic as a new patient for evaluation of tremor.    Patient seen by Dr Horacio Sheridan @ [facility] on 22 and notes \"Essential tremor - she is doing better but her heart rate is in the low 50s and she has orthostatic hypotension symptoms.  The lightheadedness is fleeting and is only when she goes from a sitting to a standing position.  Her  is present and helps her with this change in position.\" She was noted to have improving symptoms from COVID pneumonia for which she was hospitalized 8 days and and at that visit \"with no residual lung function other than occasional cough. She does have some memory difficulties which she points to as the onset of COVID.\"  Patient seen by  @ [facility] on 21  Patient seen by Dr. Borja @ [facility] on 19    History obtained from patient. Patient accompanied by Rahat, .   Initial symptom/side:   Initial symptoms began in hands a couple years ago, R > L. Tremors in hands worsened after COVID infection a couple month ago. Now " "tremors in head (no-no head shaking) associated with neck pain and whole body. Prior to COVID infection tremors in hands were not as severe and she didn't have tremor any where else. Hard to use her phone. She may have jerking movements in legs and arms. She has a hard time getting in and out of car because she can't get her legs up; needs a step stool.     Current treatment for tremors: Propranolol started prior to COVID infection which improve tremor in hands but didn't take it away.   Previous treatment for tremors:     Exacerbating factors: Denies.     Relieving factors: relaxed, meditating, concentrating on tremor to stop. Head shaking is constant and stop when she is asleep but is constant through out the day.   Does alcohol relieve symptoms: doesn't drink alcohol   Caffeine intake: doesn't drink caffeine   Change in handwriting: \"Very bad,\" sloppy though legible. Denies micrographia.   Resting tremor: Shaking in head may keep her awake. She has to meditate for tremor to go away. No resting tremor in hands.   Bradykinesia: Slower to to perform fine motor activities, like crocheting.   Rigidity: \"All over.\" All new sine COVID.   Gait problem: Uses a roller walk since falling out of bed from hospital stay from COVID pneumonia. Now is afraid of falling when walking. She walks at home without the walk without issues.  Balance/Falls: See above. Last fell on Monday while on a boat cruise and while walking up hill and was using a cane; uncertain why she fell. Denies sequelae.   Numbness/Tingling: reports \"a little bit of neuropathy in bottoms of feet.\"    Memory problems: Memory is worse since COVID but \"most of it is coming back.\" Rahat found her wandering in the dark the day after she came back from the hospital stay from COVID pneumonia.   Mood issues: \"Pretty good.\" Takes ativan prn, about once a month. Takes Abilify x 3-4 years to help with mood which helps.  History of dopamine depleting therapies: see " "above  Sleep issues/Dream enactment: \"Fair.\" Sleeps 6.5 hours. Takes 1 nap in the day about 1 hour.   Sense of smell: \"Good.\"  Constipation: daily BMs  Speech: no issues  Facial expression: no issues   Swallowing: no issues  Family Hx of tremor: Mother with tremors in hands and head.  Takes gabapentin for neuropathy.  She has asthma and is treated with levalbuteral prn daily; last used yesterday. Been using more this year due to pollen.  Takes cyclosporin and mycophenolate after getting a kidney transplant 8.5 years ago. Take topirimate x 3 years for appetite control.     Trying to move ASAP to [place].     TETRAS ADL Subscale (48 max)  (0=Normal 1=Slightly abnormal 2=Mildly 3=Moderately 4= Severely)      Movement Disorder-related Medications                   AM Mid afternoon At bedtime      Propranolol 20 mg 2 1 1     Gabapentin 300 mg   2     topirmate 200 1  1         Review of Systems:  Other than that mentioned above, the remainder of 12 systems reviewed were negative.    Past Medical History:   Diagnosis Date     Abnormal MRI, cervical spine 10/15/2011    2011; mild changes noted. Study done for left arm symptoms Impression:  1. Mild multilevel degenerative disc disease with no significant canal or neural stenosis seen. motion artifact on the STIR images in these are not interpretable. The remaining images were interpreted      Autosomal dominant polycystic kidney disease 2011     (Problem list name updated by automated process. Provider to review and confirm.)     [facility] DJD(carpometacarpal degenerative joint disease), localized primary 2013     -donor kidney transplant 2014     Gastroesophageal reflux disease      Generalized anxiety disorder 11/15/2012     Glaucoma      Hyperlipidemia 10/15/2011     Hyperparathyroidism, secondary (H) 2015     Hypertension     resolved     Immunosuppressed status (H) 2014     Major depressive disorder, recurrent episode, moderate (H) " 11/15/2012     Obesity (BMI 30-39.9)      OP (osteoporosis) T score -3.8 09/21/2009 2007 T-score -3.7      LION (obstructive sleep apnoea) 10/15/2012    reported intolerant to CPAP -- she says she doesn't have LION     Pain in joint, forearm -- L unhealed Fx 05/21/2013     Premature menopause age 37 07/10/2012    OCP (vaginal bldg)-->HT which she stopped 2 mo later documented at Jan 12, 2007 visit (age 51).      Restless leg syndrome      Stiffness of joint, not elsewhere classified, hand 03/05/2013     Tremor 10/15/2011    head     Type 2 DM with Neuropathy 1985    started with gestational diabetes     Uncomplicated asthma      Past Surgical History:   Procedure Laterality Date     ABDOMEN SURGERY       ANKLE SURGERY       C/SECTION, LOW TRANSVERSE      x 2     CHOLECYSTECTOMY  1990     COLONOSCOPY       COLONOSCOPY N/A 6/8/2022    Procedure: COLONOSCOPY, WITH POLYPECTOMY;  Surgeon: Leventhal, Thomas Michael, MD;  Location: [facility] OR     ESOPHAGOSCOPY, GASTROSCOPY, DUODENOSCOPY (EGD), COMBINED N/A 05/19/2015    Procedure: COMBINED ESOPHAGOSCOPY, GASTROSCOPY, DUODENOSCOPY (EGD);  Surgeon: Sky Davey MD;  Location:  GI     ESOPHAGOSCOPY, GASTROSCOPY, DUODENOSCOPY (EGD), COMBINED N/A 05/19/2015    Procedure: COMBINED ESOPHAGOSCOPY, GASTROSCOPY, DUODENOSCOPY (EGD), BIOPSY SINGLE OR MULTIPLE;  Surgeon: Sky Davey MD;  Location:  GI     EYE SURGERY       LAPAROSCOPY, SURGICAL; REPAIR INCISIONAL OR VENTRAL HERNIA       LASER SURGERY OF EYE Left 10/01/2020    sever vitreous strands     ORTHOPEDIC SURGERY       HERMINIA EN Y BOWEL  1990     WRIST SURGERY       [facility] TRANSPLANTATION OF KIDNEY  03/2014     Family History   Problem Relation Age of Onset     Hyperlipidemia Mother      Diabetes Mother      Hypertension Mother      Genetic Disorder Father      Mental Illness Father      Diabetes Father      Hypertension Father      Mental Illness Other         family hx     Heart Disease Other      Diabetes  Other      Cancer Other      Mental Illness Other      Diabetes Other      Glaucoma No family hx of      Macular Degeneration No family hx of      SH:   -Parents/Family/Living situation: lives in a Condo with Rahat, looking to move to [place]  -Children: 2 grown men (one in [place], other in [place])   -Marital:  43 years   -Work: teacher in the afternoons, teaches [place]   -Tobacco: deneis  -Alcohol: denies  -Illicit substances: denies     Medications:  Current Outpatient Medications   Medication Sig Dispense Refill     acetaminophen (TYLENOL) 500 MG tablet Take 1,500 mg by mouth every 6 hours as needed for mild pain       acetylcysteine (N-ACETYL CYSTEINE) 600 MG CAPS capsule Take 1 capsule (600 mg) by mouth 2 times daily (Patient does not know what strength they are taking.) 60 capsule 3     apixaban ANTICOAGULANT (ELIQUIS) 5 MG tablet Take 1 tablet (5 mg) by mouth 2 times daily       ARIPiprazole (ABILIFY) 2 MG tablet Take 1.5 tablets (3 mg) by mouth 2 times daily 90 tablet 2     ascorbic acid (VITAMIN C) 250 MG CHEW chewable tablet Take 2 tablets (500 mg) by mouth daily       beclomethasone HFA (QVAR REDIHALER) 40 MCG/ACT inhaler Inhale 1 puff into the lungs 2 times daily       cycloSPORINE modified (GENERIC EQUIVALENT) 25 MG capsule Take 5 capsules (125 mg) by mouth 2 times daily TAKE 5 CAPSULES (125MG) BY MOUTH TWO TIMES A  capsule 11     denosumab (PROLIA) 60 MG/ML SOSY injection Inject 60 mg Subcutaneous       ferrous sulfate (FEROSUL) 325 (65 Fe) MG tablet Take 1 tablet (325 mg) by mouth daily (with breakfast) 100 tablet 0     fluticasone (FLONASE) 50 MCG/ACT nasal spray Spray 1 spray into both nostrils daily (Patient taking differently: Spray 1 spray into both nostrils daily as needed) 48 g 3     furosemide (LASIX) 20 MG tablet Take 1 tablet (20 mg) by mouth daily 30 tablet 11     gabapentin (NEURONTIN) 300 MG capsule Take 2 capsules (600 mg) by mouth At Bedtime 180 capsule 2      Lactobacillus (ACIDOPHILUS) 100 MG CAPS Take 100 mg by mouth daily       latanoprost (XALATAN) 0.005 % ophthalmic solution Place 1 drop into both eyes At Bedtime 7.5 mL 4     levalbuterol (XOPENEX HFA) 45 MCG/ACT inhaler Inhale 2 puffs into the lungs every 6 hours as needed for shortness of breath / dyspnea or wheezing 15 g 3     LORazepam (ATIVAN) 0.5 MG tablet Take 1 tab (0.5mg) twice weekly as need for anxiety 30 tablet 0     metFORMIN (GLUCOPHAGE-XR) 500 MG 24 hr tablet Take 3 tablets (1,500 mg) by mouth daily (with dinner) 270 tablet 1     mycophenolic acid (GENERIC EQUIVALENT) 180 MG EC tablet Take 2 tablets (360 mg) by mouth 2 times daily       mycophenolic acid (GENERIC EQUIVALENT) 180 MG EC tablet Take 3 tablets (540 mg) by mouth 2 times daily for 90 days 540 tablet 3     ondansetron (ZOFRAN-ODT) 4 MG ODT tab Take 1 tablet (4 mg) by mouth every 6 hours as needed for nausea 20 tablet 4     order for DME Walker with front wheels and a seat. 1 Units 0     pantoprazole (PROTONIX) 40 MG EC tablet Take 1 tablet (40 mg) by mouth daily 90 tablet 3     Polyvinyl Alcohol-Povidone (REFRESH OP) Apply to eye as needed Both eyes       prazosin (MINIPRESS) 5 MG capsule Take 3 x 5mg (15mg) caps + 1 x 2mg caps at bedtime (total dose=17mg) 90 capsule 3     propranolol (INDERAL) 20 MG tablet Take 2 tablets (40 mg) by mouth 2 times daily Afternoon & evening.       psyllium (METAMUCIL/KONSYL) capsule Take 5 capsules by mouth every evening With supper.       simvastatin (ZOCOR) 20 MG tablet Take 1 tablet (20 mg) by mouth At Bedtime       sulfamethoxazole-trimethoprim (BACTRIM) 400-80 MG tablet Take 1 tablet by mouth daily 90 tablet 3     topiramate (TOPAMAX) 200 MG tablet Take 1 tablet (200 mg) by mouth 2 times daily 60 tablet 11     vilazodone (VIIBRYD) 10 MG TABS tablet Take 10mg tab with 40mg tab for total daily dose of 50 mg 30 tablet 3     vilazodone (VIIBRYD) 40 MG TABS tablet Take 40mg tab with 10mg tab for total daily  dose of 50 mg 30 tablet 3     fluticasone (FLOVENT HFA) 220 MCG/ACT inhaler Inhale 1 puff into the lungs             Allergies   Allergen Reactions     Percocet [Oxycodone-Acetaminophen] Nausea and Vomiting     Novocain [Procaine Hcl] Hives     Had reaction 25 years ago to old renetta. Pt reports multiple injections of lidocaine since then without reaction.  Tolerated lidocaine injection today without difficulty.  Osmar Mark MD IR Service.         Physical Exam:  The patient's  weight is 85.7 kg (189 lb). Her blood pressure is 139/115 (abnormal) and her pulse is 51. Her respiration is 16 and oxygen saturation is 100%.    Physical Exam:  GENERAL: alert, active, attentive, appropriately groomed   HEENT: normocephalic, eyes open with no discharge, nares patent, oropharynx clear-no lesions  CHEST: non labored breathing  EXTREMITIES: no edema/cyanosis in BUE/BLE,  PSYCH: mood stable     Neurologic Exam:  MENTAL STATUS: Alert and oriented to person, place, time, and situation. Follows commands. Recent and remote memory intact. Attention span and concentration intact. Fund of knowledge intact to current events.  SPEECH: Fluent, intact comprehension and articulation  CN: visual fields intact in all fields, EOMIB, no nystagmus, normal saccades, facial sensation intact, facial movement symmetric, hearing grossly intact to conversation, tongue protrudes midline   MOTOR: Moves all extremities equally against gravity without difficulty with 4/4 ShAbd, 5/5 ElbFlex,4/4 ElbEx, 5/5 HipFlex, 5/5 KneeExt, 5/5 KneeFlex, 5/5 ADF  Involuntary movements:   no-no head shaking  Tremors in arms but seem to be radiating from head tremors   Fingers taps broken up due to tremor, mildly bradykinetic b/l   REFLEXES (R/L): 2/2  in biceps, brachioradialis, triceps, patellars, 1/1 achilles; no clonus  SENSATION: intact to light touch throughout   COORDINATION: no dysmetria with FTN, HTS  GAIT: able to slowly rise unassisted from a seated  position, decreased b/l arm swing and stride length, en bloc turns, no ataxia, tremor in both hands while walking R > L    Motor (Performance) Sub-Scale 6/24/2022   Assessment Time 8:42 AM   Medication On   DBS - Right Brain None   DBS - Left Brain None   Head 2   Face & Jaw 0   Voice 0   Outstretched - RIGHT 2   Outstretched - LEFT 1.5   Wingbeating - RIGHT 2   Wingbeating - LEFT 1   Kinetic - RIGHT 1.5   Kinetic - LEFT 1   Lower Limb - RIGHT 2   Lower Limb - LEFT 1.5   Lower Limb (Max) 2   Spiral - RIGHT 2.5   Spiral - LEFT 1   Handwriting 2   Dot approx - RIGHT 2   Dot approx - LEFT 1.5   Trunk (Standing) 1   Total Right 12   Total Left 7.5   Axial 2   TOTAL 23     UPDRS Values 6/24/2022   Speech 0   Facial Expression 0   Rigidity Neck 0   Rigidity RUE 0   Rigidity LUE 0   Rigidity RLE 0   Rigidity LLE 0   Finger Taps R 1   Finger Taps L 1   Hand Mvt R 0   Hand Mvt L 0   Pron-/Supinate R 0   Pron-/Supinate L 0   Toe Tap R 1   Toe Tap L 0   Leg Agility R 0   Leg Agility L 0   Arise From Chair 1   Gait 1   Gait Freezing 0   Posture 0   Global Spont Mvt 1   Postural Tremor RUE 2   Postural Tremor LUE 1   Kinetic Tremor RUE 2   Kinetic Tremor LUE 1   Rest Tremor RUE 1   Rest Tremor LUE 1   Rest Tremor RLE 0   Rest Tremor LLE 0   Rest Tremor Lip/Jaw 0   Rest Tremor Constancy 0   Total Right 7   Total Left 4     Data Reviewed: I have personally reviewed the tests/studies below.   TSH 0.92 02/10/2022   T4 0.96 10/30/2018   CBC 6/2022 WNL  4/2022 A1c 7.3%, CMP with elevate Cr, decrease proten and alubumin    [facility]:  MRI Lumbar Spine 6/19/21     [facility]:  MRI Cervical Spine 4/22/19  MRI Brain 4/22/19    [facility]:  CT Lumbar Spine 6/18/21    Impression:  Elizabeth Palma is a 66 year old female with tremor.   We discussed several different factors that exacerbate tremor including uncontrolled emotional situations, poorly controlled depression, stress, anxiety, poor sleep, metabolic (diabetes), and electrolyte  abnormalities, medication side effects(cyclosporine 12% to 55%, levalbuterol less than 2% to 6.8%, aripiprazole 2% to 11.8%, etc. We discussed several therapies for treatment of tremor including medications, therapies (OT for assistive devices).       1. Postural tremor: Essential tremor vs medication induced (cyclosporine, aripiprazole, levalbuterol) and likely exacerbated by diabetes and recent COVID infection. Patient in tremor in hands and head; no-no head tremors are constant and most bothersome to patient as it is associated with neck pain from constant head movements. Possible cervical dystonia. She notices hand tremors mostly with action but may observe them at rest. Patient's mother with similar tremors in hands and head. Would not change dose of above meds as they are necessary to medications but is something to note. We will attempt to manage tremor with medications, however, this may be difficult due to the above factors. Will avoid treating with primidone as this may decrease efficacy of Mycophenoloate.   2. Poor balance associated with falls associated with #3  3. Diabetic neuropathy  4. Subtle signs of parkinsonism on exam may be secondary to aripiprazole: Exam with resting tremor, bradykinesia, tremor with walking, en bloc turns (could be due to neuropathy). Will continue to observe.   5. Asthma on levalbuterol     Plan:   - OT referral for assistive devices, such as weighted utensils, weighted wrist bands, etc.   - Consider decreasing or tapering off of propranolol in setting of asthma. If kept on this medication, would closely monitor for bronchospasms.   - To treat tremor, optimize gabapentin 300mg by increasing the dose. Follow instructions below:   - Week 1, take 1 capsule in the AM, continue 2 capsules in PM   - Week 2, continue 1 capsule in AM, take 1 capsule in PM,  continue 2 capsules in PM   - Stop at lowest effective dose that improve tremor.  - Places to find out more information about  essential tremor and treatments are:  Essentialtremor.org  And  You can go to [employer].com and look for patient information about tremor under the guidelines section.  - Call the clinic for questions or concerns.  - Appropriate for botulinum toxin treatment for tremor of head. We discussed benefits and potential adverse effects of this treatment. Patient is interested and would like to proceed.   - Will submit request for prior authorization for botulinum toxin therapy from insurer.  - Schedule for injection procedure when authorization has been determined.  Muscles Injected Units Injected Number of Injections   Right splenius capitis 10 1   Right obliquus capitis, inferior 5 1   Right longissimus 5 1        Left splenius capitus 10 1   Left  obliquus capitis, inferior  5 1   Left longissiums 5 1        Predicted Total Units Injected: 40                 - Will continue to monitor symptoms of parkinsonism   - Management of DM, per Dr. Sheridan    Patient to return in 2-3 months, for in-person or virtual visit, 60 minutes.     Donna Perez DO, MA   of Neurology   [facility]     115 minutes spent on date of encounter doing chart reviews and exam and documentation and further activities as noted above.     Herson Chong, MS-4, was present for this visit for educational purposes.        Sincerely,    Donna Perez DO

## 2022-06-27 ENCOUNTER — APPOINTMENT (OUTPATIENT)
Dept: CT IMAGING | Facility: CLINIC | Age: 67
DRG: 689 | End: 2022-06-27
Attending: EMERGENCY MEDICINE
Payer: MEDICARE

## 2022-06-27 ENCOUNTER — LAB (OUTPATIENT)
Dept: LAB | Facility: CLINIC | Age: 67
End: 2022-06-27
Payer: MEDICARE

## 2022-06-27 ENCOUNTER — APPOINTMENT (OUTPATIENT)
Dept: ULTRASOUND IMAGING | Facility: CLINIC | Age: 67
DRG: 689 | End: 2022-06-27
Attending: EMERGENCY MEDICINE
Payer: MEDICARE

## 2022-06-27 ENCOUNTER — APPOINTMENT (OUTPATIENT)
Dept: GENERAL RADIOLOGY | Facility: CLINIC | Age: 67
DRG: 689 | End: 2022-06-27
Attending: EMERGENCY MEDICINE
Payer: MEDICARE

## 2022-06-27 ENCOUNTER — TELEPHONE (OUTPATIENT)
Dept: TRANSPLANT | Facility: CLINIC | Age: 67
End: 2022-06-27

## 2022-06-27 ENCOUNTER — HOSPITAL ENCOUNTER (INPATIENT)
Facility: CLINIC | Age: 67
LOS: 2 days | Discharge: HOME HEALTH | DRG: 689 | End: 2022-06-29
Attending: EMERGENCY MEDICINE | Admitting: INTERNAL MEDICINE
Payer: MEDICARE

## 2022-06-27 DIAGNOSIS — Z11.52 ENCOUNTER FOR SCREENING LABORATORY TESTING FOR SEVERE ACUTE RESPIRATORY SYNDROME CORONAVIRUS 2 (SARS-COV-2): ICD-10-CM

## 2022-06-27 DIAGNOSIS — Z94.0 HISTORY OF KIDNEY TRANSPLANT: ICD-10-CM

## 2022-06-27 DIAGNOSIS — R79.89 ELEVATED SERUM CREATININE: ICD-10-CM

## 2022-06-27 DIAGNOSIS — E21.3 HYPERPARATHYROIDISM (H): ICD-10-CM

## 2022-06-27 DIAGNOSIS — N17.9 AKI (ACUTE KIDNEY INJURY) (H): ICD-10-CM

## 2022-06-27 DIAGNOSIS — R41.0 DELIRIUM: Primary | ICD-10-CM

## 2022-06-27 DIAGNOSIS — M81.0 SENILE OSTEOPOROSIS: ICD-10-CM

## 2022-06-27 DIAGNOSIS — M81.0 AGE-RELATED OSTEOPOROSIS WITHOUT CURRENT PATHOLOGICAL FRACTURE: ICD-10-CM

## 2022-06-27 DIAGNOSIS — Z94.0 KIDNEY REPLACED BY TRANSPLANT: ICD-10-CM

## 2022-06-27 DIAGNOSIS — R41.82 ALTERED MENTAL STATUS, UNSPECIFIED ALTERED MENTAL STATUS TYPE: ICD-10-CM

## 2022-06-27 DIAGNOSIS — Z92.29 PERSONAL HISTORY OF OTHER DRUG THERAPY: ICD-10-CM

## 2022-06-27 LAB
ALBUMIN SERPL BCG-MCNC: 3.9 G/DL (ref 3.5–5.2)
ALBUMIN UR-MCNC: NEGATIVE MG/DL
ALP SERPL-CCNC: 45 U/L (ref 35–104)
ALT SERPL W P-5'-P-CCNC: 18 U/L (ref 10–35)
ANION GAP SERPL CALCULATED.3IONS-SCNC: 10 MMOL/L (ref 3–14)
ANION GAP SERPL CALCULATED.3IONS-SCNC: 10 MMOL/L (ref 7–15)
APPEARANCE UR: CLEAR
AST SERPL W P-5'-P-CCNC: 18 U/L (ref 10–35)
BILIRUB SERPL-MCNC: 0.4 MG/DL
BILIRUB UR QL STRIP: NEGATIVE
BUN SERPL-MCNC: 16 MG/DL (ref 7–30)
BUN SERPL-MCNC: 16.2 MG/DL (ref 8–23)
CA-I BLD-MCNC: 5 MG/DL (ref 4.4–5.2)
CALCIUM SERPL-MCNC: 9.1 MG/DL (ref 8.5–10.1)
CALCIUM SERPL-MCNC: 9.5 MG/DL (ref 8.8–10.2)
CHLORIDE BLD-SCNC: 110 MMOL/L (ref 94–109)
CHLORIDE SERPL-SCNC: 106 MMOL/L (ref 98–107)
CO2 SERPL-SCNC: 22 MMOL/L (ref 20–32)
COLOR UR AUTO: YELLOW
CREAT SERPL-MCNC: 1.45 MG/DL (ref 0.51–0.95)
CREAT SERPL-MCNC: 1.45 MG/DL (ref 0.52–1.04)
CYCLOSPORINE BLD LC/MS/MS-MCNC: 82 UG/L (ref 50–400)
DEPRECATED CALCIDIOL+CALCIFEROL SERPL-MC: 42 UG/L (ref 20–75)
DEPRECATED HCO3 PLAS-SCNC: 20 MMOL/L (ref 22–29)
ERYTHROCYTE [DISTWIDTH] IN BLOOD BY AUTOMATED COUNT: 14.4 % (ref 10–15)
GFR SERPL CREATININE-BSD FRML MDRD: 40 ML/MIN/1.73M2
GFR SERPL CREATININE-BSD FRML MDRD: 40 ML/MIN/1.73M2
GLUCOSE BLD-MCNC: 158 MG/DL (ref 70–99)
GLUCOSE SERPL-MCNC: 122 MG/DL (ref 70–99)
GLUCOSE UR STRIP-MCNC: NEGATIVE MG/DL
HCT VFR BLD AUTO: 36.6 % (ref 35–47)
HGB BLD-MCNC: 11.6 G/DL (ref 11.7–15.7)
HGB UR QL STRIP: NEGATIVE
HYALINE CASTS: 1 /LPF
KETONES UR STRIP-MCNC: NEGATIVE MG/DL
LEUKOCYTE ESTERASE UR QL STRIP: ABNORMAL
MCH RBC QN AUTO: 30.2 PG (ref 26.5–33)
MCHC RBC AUTO-ENTMCNC: 31.7 G/DL (ref 31.5–36.5)
MCV RBC AUTO: 95 FL (ref 78–100)
MUCOUS THREADS #/AREA URNS LPF: PRESENT /LPF
NITRATE UR QL: NEGATIVE
PH UR STRIP: 6 [PH] (ref 5–7)
PLATELET # BLD AUTO: 153 10E3/UL (ref 150–450)
POTASSIUM BLD-SCNC: 5.1 MMOL/L (ref 3.4–5.3)
POTASSIUM SERPL-SCNC: 4.4 MMOL/L (ref 3.4–5.3)
PROT SERPL-MCNC: 5.4 G/DL (ref 6.4–8.3)
RBC # BLD AUTO: 3.84 10E6/UL (ref 3.8–5.2)
RBC URINE: <1 /HPF
SARS-COV-2 RNA RESP QL NAA+PROBE: NEGATIVE
SODIUM SERPL-SCNC: 136 MMOL/L (ref 136–145)
SODIUM SERPL-SCNC: 142 MMOL/L (ref 133–144)
SP GR UR STRIP: 1.01 (ref 1–1.03)
SQUAMOUS EPITHELIAL: <1 /HPF
TME LAST DOSE: NORMAL H
TME LAST DOSE: NORMAL H
UROBILINOGEN UR STRIP-MCNC: NORMAL MG/DL
WBC # BLD AUTO: 4.6 10E3/UL (ref 4–11)
WBC URINE: 14 /HPF

## 2022-06-27 PROCEDURE — 87086 URINE CULTURE/COLONY COUNT: CPT | Performed by: INTERNAL MEDICINE

## 2022-06-27 PROCEDURE — 99207 PR APP CREDIT; MD BILLING SHARED VISIT: CPT | Performed by: NURSE PRACTITIONER

## 2022-06-27 PROCEDURE — G1010 CDSM STANSON: HCPCS

## 2022-06-27 PROCEDURE — 96361 HYDRATE IV INFUSION ADD-ON: CPT

## 2022-06-27 PROCEDURE — 72100 X-RAY EXAM L-S SPINE 2/3 VWS: CPT | Mod: 26 | Performed by: RADIOLOGY

## 2022-06-27 PROCEDURE — 99223 1ST HOSP IP/OBS HIGH 75: CPT | Mod: AI | Performed by: INTERNAL MEDICINE

## 2022-06-27 PROCEDURE — 82306 VITAMIN D 25 HYDROXY: CPT | Performed by: INTERNAL MEDICINE

## 2022-06-27 PROCEDURE — G1010 CDSM STANSON: HCPCS | Mod: GC | Performed by: RADIOLOGY

## 2022-06-27 PROCEDURE — 80158 DRUG ASSAY CYCLOSPORINE: CPT | Performed by: INTERNAL MEDICINE

## 2022-06-27 PROCEDURE — 250N000013 HC RX MED GY IP 250 OP 250 PS 637: Performed by: NURSE PRACTITIONER

## 2022-06-27 PROCEDURE — 99285 EMERGENCY DEPT VISIT HI MDM: CPT | Performed by: EMERGENCY MEDICINE

## 2022-06-27 PROCEDURE — 81001 URINALYSIS AUTO W/SCOPE: CPT | Performed by: PATHOLOGY

## 2022-06-27 PROCEDURE — 82330 ASSAY OF CALCIUM: CPT | Performed by: PATHOLOGY

## 2022-06-27 PROCEDURE — 72100 X-RAY EXAM L-S SPINE 2/3 VWS: CPT

## 2022-06-27 PROCEDURE — 80053 COMPREHEN METABOLIC PANEL: CPT | Performed by: PATHOLOGY

## 2022-06-27 PROCEDURE — 250N000011 HC RX IP 250 OP 636: Performed by: NURSE PRACTITIONER

## 2022-06-27 PROCEDURE — 36415 COLL VENOUS BLD VENIPUNCTURE: CPT | Performed by: PATHOLOGY

## 2022-06-27 PROCEDURE — 76776 US EXAM K TRANSPL W/DOPPLER: CPT

## 2022-06-27 PROCEDURE — 72125 CT NECK SPINE W/O DYE: CPT | Mod: 26 | Performed by: RADIOLOGY

## 2022-06-27 PROCEDURE — 96366 THER/PROPH/DIAG IV INF ADDON: CPT

## 2022-06-27 PROCEDURE — 36415 COLL VENOUS BLD VENIPUNCTURE: CPT | Performed by: EMERGENCY MEDICINE

## 2022-06-27 PROCEDURE — 76776 US EXAM K TRANSPL W/DOPPLER: CPT | Mod: 26 | Performed by: RADIOLOGY

## 2022-06-27 PROCEDURE — 99285 EMERGENCY DEPT VISIT HI MDM: CPT | Mod: 25

## 2022-06-27 PROCEDURE — C9803 HOPD COVID-19 SPEC COLLECT: HCPCS

## 2022-06-27 PROCEDURE — 85027 COMPLETE CBC AUTOMATED: CPT | Performed by: PATHOLOGY

## 2022-06-27 PROCEDURE — 250N000013 HC RX MED GY IP 250 OP 250 PS 637: Performed by: EMERGENCY MEDICINE

## 2022-06-27 PROCEDURE — 96365 THER/PROPH/DIAG IV INF INIT: CPT

## 2022-06-27 PROCEDURE — U0003 INFECTIOUS AGENT DETECTION BY NUCLEIC ACID (DNA OR RNA); SEVERE ACUTE RESPIRATORY SYNDROME CORONAVIRUS 2 (SARS-COV-2) (CORONAVIRUS DISEASE [COVID-19]), AMPLIFIED PROBE TECHNIQUE, MAKING USE OF HIGH THROUGHPUT TECHNOLOGIES AS DESCRIBED BY CMS-2020-01-R: HCPCS | Performed by: EMERGENCY MEDICINE

## 2022-06-27 PROCEDURE — 70450 CT HEAD/BRAIN W/O DYE: CPT | Mod: 26 | Performed by: RADIOLOGY

## 2022-06-27 PROCEDURE — 258N000003 HC RX IP 258 OP 636: Performed by: EMERGENCY MEDICINE

## 2022-06-27 PROCEDURE — 120N000002 HC R&B MED SURG/OB UMMC

## 2022-06-27 RX ORDER — CEFTRIAXONE 2 G/1
2 INJECTION, POWDER, FOR SOLUTION INTRAMUSCULAR; INTRAVENOUS EVERY 24 HOURS
Status: DISCONTINUED | OUTPATIENT
Start: 2022-06-27 | End: 2022-06-29 | Stop reason: HOSPADM

## 2022-06-27 RX ORDER — METFORMIN HCL 500 MG
1500 TABLET, EXTENDED RELEASE 24 HR ORAL
Qty: 270 TABLET | Refills: 1 | Status: SHIPPED | OUTPATIENT
Start: 2022-06-27

## 2022-06-27 RX ORDER — ACETAMINOPHEN 500 MG
1000 TABLET ORAL ONCE
Status: COMPLETED | OUTPATIENT
Start: 2022-06-27 | End: 2022-06-27

## 2022-06-27 RX ADMIN — CEFTRIAXONE SODIUM 2 G: 2 INJECTION, POWDER, FOR SOLUTION INTRAMUSCULAR; INTRAVENOUS at 21:50

## 2022-06-27 RX ADMIN — ACETAMINOPHEN 1000 MG: 500 TABLET ORAL at 20:07

## 2022-06-27 RX ADMIN — OXYCODONE HYDROCHLORIDE 2.5 MG: 5 TABLET ORAL at 21:21

## 2022-06-27 RX ADMIN — SODIUM CHLORIDE 1000 ML: 9 INJECTION, SOLUTION INTRAVENOUS at 18:53

## 2022-06-27 ASSESSMENT — ENCOUNTER SYMPTOMS
VOMITING: 0
DYSURIA: 0
CONFUSION: 1
HEADACHES: 0
BACK PAIN: 1
NAUSEA: 0
FREQUENCY: 0
ABDOMINAL PAIN: 0
DIZZINESS: 1
FATIGUE: 1
NECK PAIN: 1
FEVER: 0
COUGH: 0

## 2022-06-27 ASSESSMENT — ACTIVITIES OF DAILY LIVING (ADL)
ADLS_ACUITY_SCORE: 37

## 2022-06-27 NOTE — TELEPHONE ENCOUNTER
"ISSUE: elevated creatinine: 1.45, baseline:    and abnormal UA    PLAN: assess for S/S UTI  BP 90/50, HR 50s, using walking as she reports dizziness  Denies abdominal pain,   Appetite: fair    OUTCOME: patient repeating herself and unable to finish her sentence. Requiring prompting.  Patient states she was at a \"game place\" with her  and grand son.  not near patient at time of call.  Asked if huy was seated somewhere safe, she stated yes.  Denies headache, confusion, weakness or numbness in face or extremities.      Spoke with  who states Huy just seems tired.  Explained to Rahat that with  Elevated creatinine,  Possible UTI, hypotension, and confusion/extreme fatigue evaluation in the ED is recommended.  Patient and Rahat v/u.    Dr Jordan notified.    Marivel Marcelo RN   Transplant Coordinator  [phone]      "

## 2022-06-27 NOTE — ED PROVIDER NOTES
[place] EMERGENCY DEPARTMENT ([facility])  6/27/22    History     Chief Complaint   Patient presents with     Referral     The history is provided by the patient and medical records.     Elizabeth Palma is a 66 year old female with a history of polycystic kidney disease s/p kidney transplant (3/2014), type 2 diabetes mellitus, HTN, and HLD who was referred to the Emergency Department with confusion and CORA with possible UTI. Patient reports she had lab work done earlier today. Results returned for possible UTI with moderate leukocyte esterase and 14 WBCs. Patient reports she has had urinary urgency. No dysuria, frequency, or incontinence.  Patient reports confusion.  She states she has had confusion like this before and thinks it was associated with UTI.  Patient reports she is tired and does not know what is going on.  She states this started today.  Patient reports she fell a few days ago and she believes she hit her head.  She is unsure if she lost consciousness.  She was not seen for this.  Patient reports left-sided neck pain since the fall.  She notes she has a tremor at baseline and this has been exacerbating her neck pain.  She also has lower midline back pain but states it started today.  She denies headache.  She has been taking Tylenol.  Patient reports she has been dizzy.  She states she has had low blood pressure in the mornings.  Her  has had to catch her few times.  She is unable to walk without a walker, typically does not use this.  Patient is on Eliquis due to prior PE.  Patient denies nausea, vomiting, abdominal pain, known fever, or cough.  She is unsure if she has numbness or weakness in her legs.  She is not eating and drinking normally.  Patient reports recent medication change, states her gabapentin was increased last Friday, 6/24.    Past Medical History  Past Medical History:   Diagnosis Date     Abnormal MRI, cervical spine 10/15/2011    4/2011; mild changes noted. Study  done for left arm symptoms Impression:  1. Mild multilevel degenerative disc disease with no significant canal or neural stenosis seen. motion artifact on the STIR images in these are not interpretable. The remaining images were interpreted      Autosomal dominant polycystic kidney disease 2011     (Problem list name updated by automated process. Provider to review and confirm.)     CMC DJD(carpometacarpal degenerative joint disease), localized primary 2013     -donor kidney transplant 2014     Gastroesophageal reflux disease      Generalized anxiety disorder 11/15/2012     Glaucoma      Hyperlipidemia 10/15/2011     Hyperparathyroidism, secondary (H) 2015     Hypertension     resolved     Immunosuppressed status (H) 2014     Major depressive disorder, recurrent episode, moderate (H) 11/15/2012     Obesity (BMI 30-39.9)      OP (osteoporosis) T score -3.8 2009 T-score -3.7      LION (obstructive sleep apnoea) 10/15/2012    reported intolerant to CPAP -- she says she doesn't have LION     Pain in joint, forearm -- L unhealed Fx 2013     Premature menopause age 37 07/10/2012    OCP (vaginal bldg)-->HT which she stopped 2 mo later documented at 2007 visit (age 51).      Restless leg syndrome      Stiffness of joint, not elsewhere classified, hand 2013     Tremor 10/15/2011    head     Type 2 DM with Neuropathy     started with gestational diabetes     Uncomplicated asthma      Past Surgical History:   Procedure Laterality Date     ABDOMEN SURGERY       ANKLE SURGERY       C/SECTION, LOW TRANSVERSE      x 2     CHOLECYSTECTOMY       COLONOSCOPY       COLONOSCOPY N/A 2022    Procedure: COLONOSCOPY, WITH POLYPECTOMY;  Surgeon: Leventhal, Thomas Michael, MD;  [place] [facility] [place]     ESOPHAGOSCOPY, GASTROSCOPY, DUODENOSCOPY (EGD), COMBINED N/A 2015    Procedure: COMBINED ESOPHAGOSCOPY, GASTROSCOPY, DUODENOSCOPY (EGD);  Surgeon: Tamica  Sky Jay MD;  Location:  GI     ESOPHAGOSCOPY, GASTROSCOPY, DUODENOSCOPY (EGD), COMBINED N/A 05/19/2015    Procedure: COMBINED ESOPHAGOSCOPY, GASTROSCOPY, DUODENOSCOPY (EGD), BIOPSY SINGLE OR MULTIPLE;  Surgeon: Sky Davey MD;  Location:  GI     EYE SURGERY       LAPAROSCOPY, SURGICAL; REPAIR INCISIONAL OR VENTRAL HERNIA       LASER SURGERY OF EYE Left 10/01/2020    sever vitreous strands     ORTHOPEDIC SURGERY       HERMINIA EN Y BOWEL  1990     WRIST SURGERY       Z TRANSPLANTATION OF KIDNEY  03/2014     No current outpatient medications on file.    Allergies   Allergen Reactions     Percocet [Oxycodone-Acetaminophen] Nausea and Vomiting     Novocain [Procaine Hcl] Hives     Had reaction 25 years ago to old renetta. Pt reports multiple injections of lidocaine since then without reaction.  Tolerated lidocaine injection today without difficulty.  Osmar Mark MD IR Service.     Family History  Family History   Problem Relation Age of Onset     Hyperlipidemia Mother      Diabetes Mother      Hypertension Mother      Genetic Disorder Father      Mental Illness Father      Diabetes Father      Hypertension Father      Mental Illness Other         family hx     Heart Disease Other      Diabetes Other      Cancer Other      Mental Illness Other      Diabetes Other      Glaucoma No family hx of      Macular Degeneration No family hx of      Social History   Social History     Tobacco Use     Smoking status: Never Smoker     Smokeless tobacco: Never Used   Substance Use Topics     Alcohol use: No     Alcohol/week: 0.0 standard drinks     Drug use: No      Past medical history, past surgical history, medications, allergies, family history, and social history were reviewed with the patient. No additional pertinent items.       Review of Systems   Constitutional: Positive for fatigue. Negative for fever.   Respiratory: Negative for cough.    Gastrointestinal: Negative for abdominal pain, nausea and  "vomiting.   Genitourinary: Positive for urgency. Negative for dysuria and frequency.   Musculoskeletal: Positive for back pain (low, midline) and neck pain (left-sided).   Neurological: Positive for dizziness. Negative for headaches.   Psychiatric/Behavioral: Positive for confusion.   All other systems reviewed and are negative.    A complete review of systems was performed with pertinent positives and negatives noted in the HPI, and all other systems negative.    Physical Exam   BP: 129/78  Pulse: 50  Temp: 97.4  F (36.3  C)  Resp: 16  Height: 154.9 cm (5' 1\")  Weight: 85.7 kg (188 lb 15 oz)  SpO2: 100 %  Physical Exam  Vitals and nursing note reviewed.   Constitutional:       General: She is not in acute distress.     Appearance: She is well-developed. She is obese. She is not toxic-appearing or diaphoretic.   HENT:      Head: Normocephalic and atraumatic.      Nose: Nose normal.      Mouth/Throat:      Mouth: Mucous membranes are dry.   Eyes:      General: No scleral icterus.     Extraocular Movements: Extraocular movements intact.      Conjunctiva/sclera: Conjunctivae normal.   Cardiovascular:      Rate and Rhythm: Normal rate.   Pulmonary:      Effort: Pulmonary effort is normal. No respiratory distress.      Breath sounds: No stridor.   Abdominal:      General: There is no distension.   Musculoskeletal:         General: No deformity or signs of injury.      Cervical back: Normal range of motion and neck supple. No edema, erythema, rigidity or tenderness. Pain with movement and muscular tenderness present. No spinous process tenderness. Normal range of motion.      Lumbar back: Tenderness and bony tenderness present. Normal range of motion.        Back:    Skin:     General: Skin is warm and dry.      Coloration: Skin is not jaundiced or pale.   Neurological:      General: No focal deficit present.      Mental Status: She is alert and oriented to person, place, and time.      GCS: GCS eye subscore is 4. GCS " verbal subscore is 5. GCS motor subscore is 6.      Cranial Nerves: No dysarthria or facial asymmetry.      Motor: Tremor present. No weakness.   Psychiatric:         Attention and Perception: She is inattentive.         Mood and Affect: Affect is flat.         Speech: Speech normal.         Behavior: Behavior is slowed.         Cognition and Memory: Cognition is impaired. Memory is impaired.         Judgment: Judgment is impulsive.         ED Course   5:57 PM  The patient was seen and examined by Maisha Ruelas MD in [facility].      Procedures                     Results for orders placed or performed during the hospital encounter of 06/27/22   CT Head w/o Contrast     Status: None    Narrative    CT HEAD W/O CONTRAST 6/27/2022 7:10 PM    History: fall, confusion     Comparison: Head CT on 5/20/2021    Technique: Using multidetector thin collimation helical acquisition  technique, axial, coronal and sagittal CT images from the skull base  to the vertex were obtained without intravenous contrast.   (topogram) image(s) also obtained and reviewed.    Findings: Findings are somewhat limited secondary to motion artifact.  There is no intracranial hemorrhage, mass effect, or midline shift.  Gray/white matter differentiation in both cerebral hemispheres is  preserved. No suspicious interval change in predominantly frontal  volume loss. Ventricles are proportionate to the cerebral sulci. The  basal cisterns are clear. Vascular calcifications of the carotid  siphons.    Old inferior right orbital floor fracture, unchanged. Complete  opacification of the right maxillary sinus. Mucosal thickening in the  sphenoid and left maxillary sinus. The mastoid air cells are clear.      Impression    Impression:  1. No acute intracranial pathology.   2. Unchanged predominantly frontal volume loss.    I have personally reviewed the examination and initial interpretation  and I agree with the findings.    FAHEEM ENAMORADO MD         SYSTEM  ID:  [id number]   CT Cervical Spine w/o Contrast     Status: None    Narrative    CT CERVICAL SPINE W/O CONTRAST 6/27/2022 7:10 PM    Provided History: fall    Comparison: MRI 4/22/2019, CT 6/21/2011    Technique: Using multidetector thin collimation helical acquisition  technique, axial, coronal and sagittal CT images through the cervical  spine were obtained without intravenous contrast.     Findings:  The cervical vertebrae are normally aligned. Straightening of the  normal cervical lordosis. Congenital incomplete fusion of the spinous  process of C2. No acute fracture or subluxation. No prevertebral  edema. There is advanced disc height narrowing at C5-C6. The findings  on a level by level basis are as follows:    C2-3:  Right greater than left facet hypertrophy. No significant  spinal canal or neural foraminal stenosis.    C3-4:  Right greater than left facet hypertrophy. Mild right neural  foraminal narrowing. No left neural foraminal stenosis. No spinal  canal stenosis.    C4-5:  No significant spinal canal or neural foraminal stenosis.    C5-6:  Bilateral facet arthropathy and uncinate spurring. Mild  bilateral neural foraminal stenosis. Mild spinal canal narrowing.    C6-7:  Circumferential disc osteophyte complex. No spinal canal or  neural foraminal stenosis.    C7-T1:  No significant spinal canal or neural foraminal stenosis.     No abnormality of the paraspinous soft tissues.      Impression    Impression:   1. No acute fracture or traumatic subluxation of the cervical spine.  2. Multilevel cervical spondylosis most pronounced at C5-C6.    I have personally reviewed the examination and initial interpretation  and I agree with the findings.    FAHEEM ENAMORADO MD         SYSTEM ID:  [id number]   XR Lumbar Spine 2/3 Views     Status: None    Narrative    EXAM: XR LUMBAR SPINE 2-3 VIEWS  LOCATION: [facility]  DATE/TIME: 6/27/2022 7:03 PM    INDICATION: fall, back  pain  COMPARISON: None.  TECHNIQUE: CR Lumbar Spine.      Impression    IMPRESSION: 5 lumbar type vertebral bodies. No fracture. Normal vertebral heights and alignment. Moderately severe degenerative interbody change at L5-S1 with loss of disc space height and endplate sclerosis. Otherwise unremarkable disc spaces and facets   for age. Normal extraspinal structures.   US Renal Transplant with Doppler     Status: None    Narrative    EXAMINATION: US RENAL TRANSPLANT,  6/27/2022 7:48 PM     COMPARISON: Ultrasound 6/21/2022.    HISTORY: back pain, confusion, tanisha    TECHNIQUE:  Grey-scale, color Doppler and spectral flow analysis.    FINDINGS:  The transplant kidney is located in the right lower quadrant, and  measures 10.9 cm. Parenchyma is of normal thickness and echogenicity.  No focal lesions. No hydronephrosis. No perinephric fluid collection.    Renal artery flow:   49 cm/sec peak systolic at hilum.  124 cm/s peak systolic at anastomosis. Resistive index of 0.89.  Arcuate artery resistive indices (upper to lower): 0.74, 0.80, 0.79    Renal Vein Flow:  17 cm/s at hilum.   34 cm/s at anastomosis.    Iliac artery flow:  147 peak systolic above anastomosis.  191 peak systolic below anastomosis.    Iliac vein flow:  Patent above and below the anastomosis.    The urinary bladder is moderately distended and otherwise  unremarkable.      Impression    IMPRESSION:   1. Normal grayscale appearance of the right lower quadrant transplant  kidney. No hydronephrosis.  2. Patent Doppler evaluation of the transplant kidney.  3. Elevated resistive indices throughout the arcuate arteries and  renal artery anastomosis, increased compared to 6/21/2022.  Differential would include medical renal disease and rejection.    I have personally reviewed the examination and initial interpretation  and I agree with the findings.    MEDARDO COFFMAN DO         SYSTEM ID:  [id number]   Comprehensive metabolic panel     Status: Abnormal   Result  Value Ref Range    Sodium 136 136 - 145 mmol/L    Potassium 4.4 3.4 - 5.3 mmol/L    Creatinine 1.45 (H) 0.51 - 0.95 mg/dL    Urea Nitrogen 16.2 8.0 - 23.0 mg/dL    Chloride 106 98 - 107 mmol/L    Carbon Dioxide (CO2) 20 (L) 22 - 29 mmol/L    Anion Gap 10 7 - 15 mmol/L    Glucose 122 (H) 70 - 99 mg/dL    Calcium 9.5 8.8 - 10.2 mg/dL    Protein Total 5.4 (L) 6.4 - 8.3 g/dL    Albumin 3.9 3.5 - 5.2 g/dL    Bilirubin Total 0.4 <=1.2 mg/dL    Alkaline Phosphatase 45 35 - 104 U/L    AST 18 10 - 35 U/L    ALT 18 10 - 35 U/L    GFR Estimate 40 (L) >60 mL/min/1.73m2   Asymptomatic COVID-19 Virus (Coronavirus) by PCR Nasopharyngeal     Status: Normal    Specimen: Nasopharyngeal; Swab   Result Value Ref Range    SARS CoV2 PCR Negative Negative, Testing sent to reference lab. Results will be returned via unsolicited result    Narrative    Testing was performed using the Xpert Xpress SARS-CoV-2 Assay on the  Cepheid Gene-Xpert Instrument Systems. Additional information about  this Emergency Use Authorization (EUA) assay can be found via the Lab  Guide. This test should be ordered for the detection of SARS-CoV-2 in  individuals who meet SARS-CoV-2 clinical and/or epidemiological  criteria. Test performance is unknown in asymptomatic patients. This  test is for in vitro diagnostic use under the FDA EUA for  laboratories certified under CLIA to perform high complexity testing.  This test has not been FDA cleared or approved. A negative result  does not rule out the presence of PCR inhibitors in the specimen or  target RNA in concentration below the limit of detection for the  assay. The possibility of a false negative should be considered if  the patient's recent exposure or clinical presentation suggests  COVID-19. This test was validated by the [facility]. This laboratory is certified under  the Clinical Laboratory Improvement Amendments of 1988 (CLIA-88) as  qualified to perform  high complexity laboratory testing.     Basic metabolic panel     Status: Abnormal   Result Value Ref Range    Creatinine 1.33 (H) 0.51 - 0.95 mg/dL    Sodium 140 136 - 145 mmol/L    Potassium 4.1 3.4 - 5.3 mmol/L    Urea Nitrogen 14.5 8.0 - 23.0 mg/dL    Chloride 112 (H) 98 - 107 mmol/L    Carbon Dioxide (CO2) 17 (L) 22 - 29 mmol/L    Anion Gap 11 7 - 15 mmol/L    Glucose 134 (H) 70 - 99 mg/dL    GFR Estimate 44 (L) >60 mL/min/1.73m2    Calcium 8.7 (L) 8.8 - 10.2 mg/dL   CBC with platelets     Status: Abnormal   Result Value Ref Range    WBC Count 2.8 (L) 4.0 - 11.0 10e3/uL    RBC Count 3.61 (L) 3.80 - 5.20 10e6/uL    Hemoglobin 10.9 (L) 11.7 - 15.7 g/dL    Hematocrit 35.3 35.0 - 47.0 %    MCV 98 78 - 100 fL    MCH 30.2 26.5 - 33.0 pg    MCHC 30.9 (L) 31.5 - 36.5 g/dL    RDW 14.7 10.0 - 15.0 %    Platelet Count 131 (L) 150 - 450 10e3/uL   WBC and Differential     Status: Abnormal   Result Value Ref Range    WBC Count      % Neutrophils 55 %    % Lymphocytes 19 %    % Monocytes 10 %    % Eosinophils 15 %    % Basophils 1 %    % Immature Granulocytes 0 %    Absolute Neutrophils 1.5 (L) 1.6 - 8.3 10e3/uL    Absolute Lymphocytes 0.5 (L) 0.8 - 5.3 10e3/uL    Absolute Monocytes 0.3 0.0 - 1.3 10e3/uL    Absolute Eosinophils 0.4 0.0 - 0.7 10e3/uL    Absolute Basophils 0.0 0.0 - 0.2 10e3/uL    Absolute Immature Granulocytes 0.0 <=0.4 10e3/uL   Echo Complete     Status: None   Result Value Ref Range    LVEF  60-65%     Narrative    [id number]  [id number]  [id number]  [id number]^PIPER^DILEEP^N     [facility]  Echocardiography Laboratory  [address] [place] [id number]     Name: ANITA ULRICH  MRN: [id number]  : 1955  Study Date: 2022 03:29 PM  Age: 66 yrs  Gender: Female  Patient Location: [facility]  Reason For Study: CHF  Ordering Physician: DILEEP SALDAÑA  Performed By: Rekha Prather RDCS     BSA: 1.8 m2  Height: 61 in  Weight: 189 lb  BP: 107/65  mmHg  ______________________________________________________________________________  Procedure  Echocardiogram with two-dimensional, color and spectral Doppler performed.  ______________________________________________________________________________  Interpretation Summary  Global and regional left ventricular function is normal with an EF of 60-65%.  Global right ventricular function is normal.  No significant valvular abnormalities present.  The inferior vena cava was normal in size with preserved respiratory  variability.  No pericardial effusion is present.  ______________________________________________________________________________  Left Ventricle  Left ventricular size is normal. Left ventricular wall thickness is normal.  Global and regional left ventricular function is normal with an EF of 60-65%.  No regional wall motion abnormalities are seen.     Right Ventricle  The right ventricle is normal size. Global right ventricular function is  normal.     Atria  The right atria appears normal. Mild left atrial enlargement is present.     Mitral Valve  Mild mitral annular calcification is present.     Aortic Valve  Aortic valve is normal in structure and function.     Tricuspid Valve  The tricuspid valve is normal. The peak velocity of the tricuspid regurgitant  jet is not obtainable. Pulmonary artery systolic pressure cannot be assessed.     Pulmonic Valve  The pulmonic valve is normal.     Vessels  The aorta root is normal. The inferior vena cava was normal in size with  preserved respiratory variability.     Pericardium  No pericardial effusion is present.     Compared to Previous Study  This study was compared with the study from 9.10.20 . No significant changes  noted.  ______________________________________________________________________________  MMode/2D Measurements & Calculations  IVSd: 1.1 cm  LVIDd: 3.8 cm  LVIDs: 2.4 cm  LVPWd: 0.98 cm  FS: 36.9 %  LV mass(C)d: 123.0 grams  LV mass(C)dI: 66.7  grams/m2  Ao root diam: 3.1 cm  asc Aorta Diam: 2.9 cm  LVOT diam: 2.0 cm  LVOT area: 3.1 cm2  LA Volume (BP): 66.8 ml  LA Volume Index (BP): 36.3 ml/m2     RWT: 0.52     Doppler Measurements & Calculations  MV E max lenka: 73.3 cm/sec  MV A max lenka: 54.4 cm/sec  MV E/A: 1.3  MV dec slope: 341.0 cm/sec2  MV dec time: 0.21 sec  E/E' av.4  Lateral E/e': 7.5  Medial E/e': 11.2     ______________________________________________________________________________  Report approved by: Luisa Fleming 2022 04:09 PM         WBC and differential     Status: Abnormal    Narrative    The following orders were created for panel order WBC and differential.  Procedure                               Abnormality         Status                     ---------                               -----------         ------                     WBC and Differential[[id number]]         Abnormal            Final result                 Please view results for these tests on the individual orders.     Medications   oxyCODONE IR (ROXICODONE) half-tab 2.5 mg (2.5 mg Oral Given 22 1637)   cefTRIAXone (ROCEPHIN) 2 g vial to attach to  ml bag for ADULTS or NS 50 ml bag for PEDS (2 g Intravenous New Bag 22)   acetylcysteine (N-ACETYL CYSTEINE) capsule CAPS 600 mg (600 mg Oral Given 22)   apixaban ANTICOAGULANT (ELIQUIS) tablet 5 mg (5 mg Oral Given 22)   ARIPiprazole (ABILIFY) half-tab 3 mg (3 mg Oral Given 22)   fluticasone (ARNUITY ELLIPTA) 100 MCG/ACT inhaler 1 puff (1 puff Inhalation Given 6/28/22 0913)   cycloSPORINE modified (GENERIC EQUIVALENT) capsule 125 mg (125 mg Oral Given 22 0909)   ferrous sulfate (FEROSUL) tablet 325 mg (325 mg Oral Given 22 0908)   furosemide (LASIX) tablet 20 mg ( Oral Automatically Held 22 0800)   gabapentin (NEURONTIN) capsule 600 mg (600 mg Oral Given 22 0220)   lactobacillus rhamnosus (GG) (CULTURELL) capsule 1 capsule (1 capsule Oral Given  6/28/22 1307)   latanoprost (XALATAN) 0.005 % ophthalmic solution 1 drop (1 drop Both Eyes Not Given 6/28/22 0222)   levalbuterol (XOPENEX HFA) 45 MCG/ACT Inhaler 2 puff (has no administration in time range)   LORazepam (ATIVAN) tablet 0.5 mg (has no administration in time range)   metFORMIN (GLUCOPHAGE XR) 24 hr tablet 1,500 mg (1,500 mg Oral Given 6/28/22 1638)   mycophenolic acid (GENERIC EQUIVALENT) EC tablet 540 mg (540 mg Oral Given 6/28/22 0909)   pantoprazole (PROTONIX) EC tablet 40 mg (40 mg Oral Given 6/28/22 0908)   prazosin (MINIPRESS) capsule 17 mg (17 mg Oral Given 6/28/22 0221)   propranolol (INDERAL) tablet 40 mg (40 mg Oral Not Given 6/28/22 0923)   psyllium (METAMUCIL/KONSYL) capsule 5 capsule (has no administration in time range)   simvastatin (ZOCOR) tablet 20 mg (20 mg Oral Given 6/28/22 0221)   sulfamethoxazole-trimethoprim (BACTRIM) 400-80 MG per tablet 1 tablet (1 tablet Oral Given 6/28/22 0908)   topiramate (TOPAMAX) tablet 200 mg (200 mg Oral Given 6/28/22 0909)   vilazodone (VIIBRYD) tablet 50 mg (50 mg Oral Given 6/28/22 0909)   lidocaine 1 % 0.1-1 mL (has no administration in time range)   lidocaine (LMX4) cream (has no administration in time range)   sodium chloride (PF) 0.9% PF flush 3 mL (3 mLs Intracatheter Given 6/28/22 1644)   sodium chloride (PF) 0.9% PF flush 3 mL (has no administration in time range)   melatonin tablet 1 mg (has no administration in time range)   Patient is already receiving anticoagulation with heparin, enoxaparin (LOVENOX), warfarin (COUMADIN)  or other anticoagulant medication (has no administration in time range)   acetaminophen (TYLENOL) tablet 650 mg (has no administration in time range)     Or   acetaminophen (TYLENOL) Suppository 650 mg (has no administration in time range)   senna-docusate (SENOKOT-S/PERICOLACE) 8.6-50 MG per tablet 1 tablet (has no administration in time range)     Or   senna-docusate (SENOKOT-S/PERICOLACE) 8.6-50 MG per tablet 2  "tablet (has no administration in time range)   magnesium hydroxide (MILK OF MAGNESIA) suspension 30 mL (has no administration in time range)   ondansetron (ZOFRAN ODT) ODT tab 4 mg (has no administration in time range)     Or   ondansetron (ZOFRAN) injection 4 mg (has no administration in time range)   prochlorperazine (COMPAZINE) injection 10 mg (has no administration in time range)     Or   prochlorperazine (COMPAZINE) tablet 10 mg (has no administration in time range)     Or   prochlorperazine (COMPAZINE) suppository 25 mg (has no administration in time range)   sodium bicarbonate tablet 650 mg (has no administration in time range)   0.9% sodium chloride BOLUS (0 mLs Intravenous Stopped 6/27/22 2117)   acetaminophen (TYLENOL) tablet 1,000 mg (1,000 mg Oral Given 6/27/22 2007)        Assessments & Plan (with Medical Decision Making)   Elizabeth Palma is a 66 year old female with a history of polycystic kidney disease s/p kidney transplant (3/2014), type 2 diabetes mellitus, HTN, and HLD who was referred to the Emergency Department with confusion and CORA with possible UTI.     Ddx: UTI, gabapentin side effects, polypharmacy, fall, lumbar back pain/injury, subacute C spine injury or head injury     Xray L spine and CT head and C spine ordered for eval pain and confusion after a fall a few days ago. Patient on blood thinners. Low suspicion for acute traumatic injury.      Reviewed UA and it is not particularly concerning for UTI. Cultures is in process. Patient only has urgency but no fever or dysuria. Since patient has a mild CORA and h/o renal tx, will admit to medicine for further work up and trending to resolution. Ordered Renal tx US.     Patient does not have focal neurodeficits on exam but has a strange affect. She keeps saying \"I don't know\" to all questions including questions about her pain. Appears fatigued and very apathetic. C/o unsteady gait. I have a high suspicion she is having lethargy and ataxia " related to increased dose of gabapentin. Timing of symptoms do correlate with change in dose.             I have reviewed the nursing notes. I have reviewed the findings, diagnosis, plan and need for follow up with the patient.        Final diagnoses:   Altered mental status, unspecified altered mental status type   CORA (acute kidney injury) (H)     I, Yamile Callahan, am serving as a trained medical scribe to document services personally performed by Maisha Ruelas MD, based on the provider's statements to me.      IMaisha MD, was physically present and have reviewed and verified the accuracy of this note documented by Yamile Callahan.    --  Maisha Ruelas MD  [facility] EMERGENCY DEPARTMENT  6/27/2022     Maisha Ruelas MD  06/28/22 [id number]

## 2022-06-27 NOTE — ED TRIAGE NOTES
"Pt was told to come to ER from her care team for possible UTI and abnormal Creatinine lvl.    Pt denies confusion, CP and SOB.     /78   Pulse 50   Temp 97.4  F (36.3  C) (Oral)   Resp 16   Ht 1.549 m (5' 1\")   Wt 85.7 kg (188 lb 15 oz)   LMP  (LMP Unknown)   SpO2 100%   BMI 35.70 kg/m         Triage Assessment     Row Name 06/27/22 6233       Triage Assessment (Adult)    Airway WDL WDL       Respiratory WDL    Respiratory WDL WDL       Skin Circulation/Temperature WDL    Skin Circulation/Temperature WDL WDL       Cardiac WDL    Cardiac WDL WDL       Peripheral/Neurovascular WDL    Peripheral Neurovascular WDL WDL    Capillary Refill, General less than/equal to 3 secs       Cognitive/Neuro/Behavioral WDL    Cognitive/Neuro/Behavioral WDL WDL       [place] Coma Scale    Best Eye Response 4-->(E4) spontaneous    Best Motor Response 6-->(M6) obeys commands    Best Verbal Response 5-->(V5) oriented    [place] Coma Scale Score 15              "

## 2022-06-27 NOTE — TELEPHONE ENCOUNTER
General  Route to LPN    Reason for call: Elizabeth was returning missed call    Call back needed? Yes  Return Call Needed  Same as documented in contacts section  When to return call?: Same day: Route High Priority

## 2022-06-28 ENCOUNTER — APPOINTMENT (OUTPATIENT)
Dept: CARDIOLOGY | Facility: CLINIC | Age: 67
DRG: 689 | End: 2022-06-28
Attending: INTERNAL MEDICINE
Payer: MEDICARE

## 2022-06-28 ENCOUNTER — APPOINTMENT (OUTPATIENT)
Dept: ULTRASOUND IMAGING | Facility: CLINIC | Age: 67
DRG: 689 | End: 2022-06-28
Attending: INTERNAL MEDICINE
Payer: MEDICARE

## 2022-06-28 ENCOUNTER — APPOINTMENT (OUTPATIENT)
Dept: PHYSICAL THERAPY | Facility: CLINIC | Age: 67
DRG: 689 | End: 2022-06-28
Attending: NURSE PRACTITIONER
Payer: MEDICARE

## 2022-06-28 LAB
ANION GAP SERPL CALCULATED.3IONS-SCNC: 11 MMOL/L (ref 7–15)
BACTERIA UR CULT: NORMAL
BASOPHILS # BLD AUTO: 0 10E3/UL (ref 0–0.2)
BASOPHILS NFR BLD AUTO: 1 %
BUN SERPL-MCNC: 14.5 MG/DL (ref 8–23)
CALCIUM SERPL-MCNC: 8.7 MG/DL (ref 8.8–10.2)
CHLORIDE SERPL-SCNC: 112 MMOL/L (ref 98–107)
CREAT SERPL-MCNC: 1.33 MG/DL (ref 0.51–0.95)
DEPRECATED HCO3 PLAS-SCNC: 17 MMOL/L (ref 22–29)
EOSINOPHIL # BLD AUTO: 0.4 10E3/UL (ref 0–0.7)
EOSINOPHIL NFR BLD AUTO: 15 %
ERYTHROCYTE [DISTWIDTH] IN BLOOD BY AUTOMATED COUNT: 14.7 % (ref 10–15)
GFR SERPL CREATININE-BSD FRML MDRD: 44 ML/MIN/1.73M2
GLUCOSE SERPL-MCNC: 134 MG/DL (ref 70–99)
HCT VFR BLD AUTO: 35.3 % (ref 35–47)
HGB BLD-MCNC: 10.9 G/DL (ref 11.7–15.7)
IMM GRANULOCYTES # BLD: 0 10E3/UL
IMM GRANULOCYTES NFR BLD: 0 %
LVEF ECHO: NORMAL
LYMPHOCYTES # BLD AUTO: 0.5 10E3/UL (ref 0.8–5.3)
LYMPHOCYTES NFR BLD AUTO: 19 %
MCH RBC QN AUTO: 30.2 PG (ref 26.5–33)
MCHC RBC AUTO-ENTMCNC: 30.9 G/DL (ref 31.5–36.5)
MCV RBC AUTO: 98 FL (ref 78–100)
MONOCYTES # BLD AUTO: 0.3 10E3/UL (ref 0–1.3)
MONOCYTES NFR BLD AUTO: 10 %
NEUTROPHILS # BLD AUTO: 1.5 10E3/UL (ref 1.6–8.3)
NEUTROPHILS NFR BLD AUTO: 55 %
PLATELET # BLD AUTO: 131 10E3/UL (ref 150–450)
POTASSIUM SERPL-SCNC: 4.1 MMOL/L (ref 3.4–5.3)
RBC # BLD AUTO: 3.61 10E6/UL (ref 3.8–5.2)
SODIUM SERPL-SCNC: 140 MMOL/L (ref 136–145)
WBC # BLD AUTO: 2.8 10E3/UL (ref 4–11)
WBC # BLD AUTO: ABNORMAL 10*3/UL

## 2022-06-28 PROCEDURE — 250N000013 HC RX MED GY IP 250 OP 250 PS 637: Performed by: NURSE PRACTITIONER

## 2022-06-28 PROCEDURE — 93970 EXTREMITY STUDY: CPT | Mod: 26 | Performed by: RADIOLOGY

## 2022-06-28 PROCEDURE — 120N000002 HC R&B MED SURG/OB UMMC

## 2022-06-28 PROCEDURE — 97116 GAIT TRAINING THERAPY: CPT | Mod: GP

## 2022-06-28 PROCEDURE — 93970 EXTREMITY STUDY: CPT

## 2022-06-28 PROCEDURE — 93306 TTE W/DOPPLER COMPLETE: CPT

## 2022-06-28 PROCEDURE — 99222 1ST HOSP IP/OBS MODERATE 55: CPT | Performed by: INTERNAL MEDICINE

## 2022-06-28 PROCEDURE — 80048 BASIC METABOLIC PNL TOTAL CA: CPT | Performed by: NURSE PRACTITIONER

## 2022-06-28 PROCEDURE — 250N000011 HC RX IP 250 OP 636: Performed by: NURSE PRACTITIONER

## 2022-06-28 PROCEDURE — 93306 TTE W/DOPPLER COMPLETE: CPT | Mod: 26 | Performed by: INTERNAL MEDICINE

## 2022-06-28 PROCEDURE — 36415 COLL VENOUS BLD VENIPUNCTURE: CPT | Performed by: NURSE PRACTITIONER

## 2022-06-28 PROCEDURE — 250N000013 HC RX MED GY IP 250 OP 250 PS 637: Performed by: INTERNAL MEDICINE

## 2022-06-28 PROCEDURE — 99232 SBSQ HOSP IP/OBS MODERATE 35: CPT | Performed by: INTERNAL MEDICINE

## 2022-06-28 PROCEDURE — 97161 PT EVAL LOW COMPLEX 20 MIN: CPT | Mod: GP

## 2022-06-28 PROCEDURE — 97530 THERAPEUTIC ACTIVITIES: CPT | Mod: GP

## 2022-06-28 PROCEDURE — 85027 COMPLETE CBC AUTOMATED: CPT | Performed by: NURSE PRACTITIONER

## 2022-06-28 PROCEDURE — 250N000012 HC RX MED GY IP 250 OP 636 PS 637: Performed by: NURSE PRACTITIONER

## 2022-06-28 RX ORDER — SULFAMETHOXAZOLE AND TRIMETHOPRIM 400; 80 MG/1; MG/1
1 TABLET ORAL DAILY
Status: DISCONTINUED | OUTPATIENT
Start: 2022-06-28 | End: 2022-06-29 | Stop reason: HOSPADM

## 2022-06-28 RX ORDER — METFORMIN HCL 500 MG
1500 TABLET, EXTENDED RELEASE 24 HR ORAL
Status: DISCONTINUED | OUTPATIENT
Start: 2022-06-28 | End: 2022-06-29 | Stop reason: HOSPADM

## 2022-06-28 RX ORDER — ONDANSETRON 2 MG/ML
4 INJECTION INTRAMUSCULAR; INTRAVENOUS EVERY 6 HOURS PRN
Status: DISCONTINUED | OUTPATIENT
Start: 2022-06-28 | End: 2022-06-29 | Stop reason: HOSPADM

## 2022-06-28 RX ORDER — FUROSEMIDE 20 MG
20 TABLET ORAL DAILY
Status: DISCONTINUED | OUTPATIENT
Start: 2022-06-28 | End: 2022-06-29 | Stop reason: HOSPADM

## 2022-06-28 RX ORDER — PROCHLORPERAZINE MALEATE 5 MG
10 TABLET ORAL EVERY 6 HOURS PRN
Status: DISCONTINUED | OUTPATIENT
Start: 2022-06-28 | End: 2022-06-29 | Stop reason: HOSPADM

## 2022-06-28 RX ORDER — PROCHLORPERAZINE 25 MG
25 SUPPOSITORY, RECTAL RECTAL EVERY 12 HOURS PRN
Status: DISCONTINUED | OUTPATIENT
Start: 2022-06-28 | End: 2022-06-29 | Stop reason: HOSPADM

## 2022-06-28 RX ORDER — PANTOPRAZOLE SODIUM 40 MG/1
40 TABLET, DELAYED RELEASE ORAL DAILY
Status: DISCONTINUED | OUTPATIENT
Start: 2022-06-28 | End: 2022-06-29 | Stop reason: HOSPADM

## 2022-06-28 RX ORDER — ACETAMINOPHEN 650 MG/1
650 SUPPOSITORY RECTAL EVERY 6 HOURS PRN
Status: DISCONTINUED | OUTPATIENT
Start: 2022-06-28 | End: 2022-06-29 | Stop reason: HOSPADM

## 2022-06-28 RX ORDER — LORAZEPAM 0.5 MG/1
0.5 TABLET ORAL DAILY PRN
Status: DISCONTINUED | OUTPATIENT
Start: 2022-06-28 | End: 2022-06-29 | Stop reason: HOSPADM

## 2022-06-28 RX ORDER — ACETAMINOPHEN 325 MG/1
650 TABLET ORAL EVERY 6 HOURS PRN
Status: DISCONTINUED | OUTPATIENT
Start: 2022-06-28 | End: 2022-06-29 | Stop reason: HOSPADM

## 2022-06-28 RX ORDER — TOPIRAMATE 100 MG/1
200 TABLET, FILM COATED ORAL 2 TIMES DAILY
Status: DISCONTINUED | OUTPATIENT
Start: 2022-06-28 | End: 2022-06-29 | Stop reason: HOSPADM

## 2022-06-28 RX ORDER — LEVALBUTEROL TARTRATE 45 UG/1
2 AEROSOL, METERED ORAL EVERY 6 HOURS PRN
Status: DISCONTINUED | OUTPATIENT
Start: 2022-06-28 | End: 2022-06-29 | Stop reason: HOSPADM

## 2022-06-28 RX ORDER — SIMVASTATIN 20 MG
20 TABLET ORAL AT BEDTIME
Status: DISCONTINUED | OUTPATIENT
Start: 2022-06-28 | End: 2022-06-29 | Stop reason: HOSPADM

## 2022-06-28 RX ORDER — LACTOBACILLUS RHAMNOSUS GG 10B CELL
1 CAPSULE ORAL DAILY
Status: DISCONTINUED | OUTPATIENT
Start: 2022-06-28 | End: 2022-06-29 | Stop reason: HOSPADM

## 2022-06-28 RX ORDER — SODIUM BICARBONATE 650 MG/1
650 TABLET ORAL 2 TIMES DAILY
Status: DISCONTINUED | OUTPATIENT
Start: 2022-06-28 | End: 2022-06-29 | Stop reason: HOSPADM

## 2022-06-28 RX ORDER — LATANOPROST 50 UG/ML
1 SOLUTION/ DROPS OPHTHALMIC AT BEDTIME
Status: DISCONTINUED | OUTPATIENT
Start: 2022-06-28 | End: 2022-06-29 | Stop reason: HOSPADM

## 2022-06-28 RX ORDER — AMOXICILLIN 250 MG
2 CAPSULE ORAL 2 TIMES DAILY PRN
Status: DISCONTINUED | OUTPATIENT
Start: 2022-06-28 | End: 2022-06-29 | Stop reason: HOSPADM

## 2022-06-28 RX ORDER — ONDANSETRON 4 MG/1
4 TABLET, ORALLY DISINTEGRATING ORAL EVERY 6 HOURS PRN
Status: DISCONTINUED | OUTPATIENT
Start: 2022-06-28 | End: 2022-06-29 | Stop reason: HOSPADM

## 2022-06-28 RX ORDER — AMOXICILLIN 250 MG
1 CAPSULE ORAL 2 TIMES DAILY PRN
Status: DISCONTINUED | OUTPATIENT
Start: 2022-06-28 | End: 2022-06-29 | Stop reason: HOSPADM

## 2022-06-28 RX ORDER — GABAPENTIN 300 MG/1
600 CAPSULE ORAL AT BEDTIME
Status: DISCONTINUED | OUTPATIENT
Start: 2022-06-28 | End: 2022-06-29 | Stop reason: HOSPADM

## 2022-06-28 RX ORDER — LIDOCAINE 40 MG/G
CREAM TOPICAL
Status: DISCONTINUED | OUTPATIENT
Start: 2022-06-28 | End: 2022-06-29 | Stop reason: HOSPADM

## 2022-06-28 RX ORDER — PROPRANOLOL HYDROCHLORIDE 20 MG/1
40 TABLET ORAL 2 TIMES DAILY
Status: DISCONTINUED | OUTPATIENT
Start: 2022-06-28 | End: 2022-06-29 | Stop reason: HOSPADM

## 2022-06-28 RX ORDER — FERROUS SULFATE 325(65) MG
325 TABLET ORAL
Status: DISCONTINUED | OUTPATIENT
Start: 2022-06-28 | End: 2022-06-29 | Stop reason: HOSPADM

## 2022-06-28 RX ADMIN — FERROUS SULFATE TAB 325 MG (65 MG ELEMENTAL FE) 325 MG: 325 (65 FE) TAB at 09:08

## 2022-06-28 RX ADMIN — VILAZODONE HYDROCHLORIDE 50 MG: 40 TABLET ORAL at 09:09

## 2022-06-28 RX ADMIN — SIMVASTATIN 20 MG: 20 TABLET, FILM COATED ORAL at 21:59

## 2022-06-28 RX ADMIN — MYCOPHENOLIC ACID 540 MG: 360 TABLET, DELAYED RELEASE ORAL at 09:09

## 2022-06-28 RX ADMIN — OXYCODONE HYDROCHLORIDE 2.5 MG: 5 TABLET ORAL at 16:37

## 2022-06-28 RX ADMIN — CYCLOSPORINE 125 MG: 100 CAPSULE, LIQUID FILLED ORAL at 19:45

## 2022-06-28 RX ADMIN — Medication 5 CAPSULE: at 19:43

## 2022-06-28 RX ADMIN — GABAPENTIN 600 MG: 300 CAPSULE ORAL at 21:59

## 2022-06-28 RX ADMIN — APIXABAN 5 MG: 5 TABLET, FILM COATED ORAL at 19:44

## 2022-06-28 RX ADMIN — FUROSEMIDE 20 MG: 20 TABLET ORAL at 09:08

## 2022-06-28 RX ADMIN — TOPIRAMATE 200 MG: 100 TABLET, FILM COATED ORAL at 19:45

## 2022-06-28 RX ADMIN — PANTOPRAZOLE SODIUM 40 MG: 40 TABLET, DELAYED RELEASE ORAL at 09:08

## 2022-06-28 RX ADMIN — OXYCODONE HYDROCHLORIDE 2.5 MG: 5 TABLET ORAL at 09:24

## 2022-06-28 RX ADMIN — FLUTICASONE FUROATE 1 PUFF: 100 POWDER RESPIRATORY (INHALATION) at 09:13

## 2022-06-28 RX ADMIN — CYCLOSPORINE 125 MG: 100 CAPSULE, LIQUID FILLED ORAL at 09:09

## 2022-06-28 RX ADMIN — SIMVASTATIN 20 MG: 20 TABLET, FILM COATED ORAL at 02:21

## 2022-06-28 RX ADMIN — Medication 600 MG: at 09:08

## 2022-06-28 RX ADMIN — TOPIRAMATE 200 MG: 100 TABLET, FILM COATED ORAL at 09:09

## 2022-06-28 RX ADMIN — PRAZOSIN HYDROCHLORIDE 17 MG: 5 CAPSULE ORAL at 02:21

## 2022-06-28 RX ADMIN — Medication 600 MG: at 19:44

## 2022-06-28 RX ADMIN — Medication 1 CAPSULE: at 13:07

## 2022-06-28 RX ADMIN — Medication 3 MG: at 09:08

## 2022-06-28 RX ADMIN — Medication 3 MG: at 19:45

## 2022-06-28 RX ADMIN — PROPRANOLOL HYDROCHLORIDE 40 MG: 20 TABLET ORAL at 19:44

## 2022-06-28 RX ADMIN — SODIUM BICARBONATE 650 MG: 650 TABLET ORAL at 19:45

## 2022-06-28 RX ADMIN — GABAPENTIN 600 MG: 300 CAPSULE ORAL at 02:20

## 2022-06-28 RX ADMIN — MYCOPHENOLIC ACID 540 MG: 360 TABLET, DELAYED RELEASE ORAL at 19:44

## 2022-06-28 RX ADMIN — SULFAMETHOXAZOLE AND TRIMETHOPRIM 1 TABLET: 400; 80 TABLET ORAL at 09:08

## 2022-06-28 RX ADMIN — PRAZOSIN HYDROCHLORIDE 17 MG: 5 CAPSULE ORAL at 22:02

## 2022-06-28 RX ADMIN — CEFTRIAXONE SODIUM 2 G: 2 INJECTION, POWDER, FOR SOLUTION INTRAMUSCULAR; INTRAVENOUS at 21:55

## 2022-06-28 RX ADMIN — LATANOPROST 1 DROP: 50 SOLUTION OPHTHALMIC at 22:02

## 2022-06-28 RX ADMIN — APIXABAN 5 MG: 5 TABLET, FILM COATED ORAL at 09:08

## 2022-06-28 RX ADMIN — OXYCODONE HYDROCHLORIDE 2.5 MG: 5 TABLET ORAL at 02:20

## 2022-06-28 RX ADMIN — METFORMIN ER 500 MG 1500 MG: 500 TABLET ORAL at 16:38

## 2022-06-28 ASSESSMENT — ACTIVITIES OF DAILY LIVING (ADL)
TOILETING_ISSUES: NO
ADLS_ACUITY_SCORE: 37
TRANSFERRING: 0-->ASSISTANCE NEEDED (DEVELOPMENTALLY APPROPRIATE)
WALKING_OR_CLIMBING_STAIRS_DIFFICULTY: YES
ADLS_ACUITY_SCORE: 31
NUMBER_OF_TIMES_PATIENT_HAS_FALLEN_WITHIN_LAST_SIX_MONTHS: 2
ADLS_ACUITY_SCORE: 31
ADLS_ACUITY_SCORE: 31
DIFFICULTY_EATING/SWALLOWING: NO
ADLS_ACUITY_SCORE: 31
ADLS_ACUITY_SCORE: 31
DRESSING/BATHING_DIFFICULTY: NO
ADLS_ACUITY_SCORE: 31
CONCENTRATING,_REMEMBERING_OR_MAKING_DECISIONS_DIFFICULTY: YES
DOING_ERRANDS_INDEPENDENTLY_DIFFICULTY: NO
EQUIPMENT_CURRENTLY_USED_AT_HOME: WALKER, ROLLING
ADLS_ACUITY_SCORE: 27
ADLS_ACUITY_SCORE: 31
CHANGE_IN_FUNCTIONAL_STATUS_SINCE_ONSET_OF_CURRENT_ILLNESS/INJURY: YES
FALL_HISTORY_WITHIN_LAST_SIX_MONTHS: YES
WEAR_GLASSES_OR_BLIND: YES
ADLS_ACUITY_SCORE: 31
TRANSFERRING: 1-->ASSISTANCE (EQUIPMENT/PERSON) NEEDED
VISION_MANAGEMENT: GLASSES
ADLS_ACUITY_SCORE: 31
WALKING_OR_CLIMBING_STAIRS: AMBULATION DIFFICULTY, ASSISTANCE 1 PERSON
ADLS_ACUITY_SCORE: 31

## 2022-06-28 NOTE — CONSULTS
[facility]  Transplant Nephrology Consult  Date of Admission:  6/27/2022  Today's Date: 06/28/2022  Requesting physician: Mary Jo Olmedo MD    Recommendations:  -Obtain echo due to LE edema  -Hold lasix as she only has pedal edema and has CORA  -Obtain bilateral LE U/S  -CMV and EBV PCR (ordered)  -Start sodium bicarb 650mg bid  -Ok to continue ceftriaxone while inpatient    Assessment & Plan   # DDKT: CORA, possibly 2/2 volume depletion as she continues to take lasix despite having very little edema   - Baseline Creatinine: ~ 0.9-1.1   - Proteinuria: Normal (<0.2 grams)   - Date DSA Last Checked: Nov/2019      Latest DSA: No   - BK Viremia: No   - Kidney Tx Biopsy: Apr 16, 2014; Result: mild tubular epithelial injury with concern for acute CNI toxicity. Mild arteriosclerosis and arterial hyalinosis    -Hold lasix, treat possible UTI w/ ceftriaxone       # Immunosuppression: Cyclosporine (goal ) and Mycophenolic acid (dose 540 mg every 12 hours)   - Changes: Not at this time    -CsA level tmrw    # Infection Prophylaxis:   - PJP: Sulfa/TMP (Bactrim)    # Hypertension: Controlled, but low at times;  Goal BP: > 100, but < 130 systolic   - Volume status: possibly slightly extravascularly overloaded, only with pedal edema     - Changes: Yes - hold lasix, consider decreasing or holding prazosin and propranolol with HR 53 and  systolic    # Previous PE:   -Continue eliquis    # Leukopenia:   -Obtain CMV and EBV PCR     # Diabetes: Borderline control (HbA1c 7-9%)           Last HbA1c: 7.3%              - Management as per primary team. On metformin 1500mg dialy     # Anemia in Chronic Renal Disease: Hgb: Stable      RAYMUNDO: No   - Iron studies: Not checked recently    # Mineral Bone Disorder:   - Secondary renal hyperparathyroidism; PTH level: Minimally elevated ( pg/ml)        On treatment: None  - Vitamin D; level: Not checked recently, but was normal last  check        On supplement: Yes  - Calcium; level: Low normal        On supplement: No  - Phosphorus; level: Not checked recently        On supplement: No    # Electrolytes:   - Potassium; level: Normal        On supplement: No  - Magnesium; level: Not checked recently        On supplement: No  - Bicarbonate; level: Low        On supplement: No, start bicarb 650mg bid    # Altered mental status:    -Agree with decreasing gabapentin    # Pyuria:   -Concern for pyelonephritis due to recurrent issue with AMS, U/A w/ 14 WBCs   -Ok to continue ceftriaxone while she is inpatient for a max of 7d    # Deconditioning:   -Due to decreased functionality and failure to thrive after COVID in 2022 I recommend PT evaluation for consideration of TCU    # Transplant History:  Etiology of Kidney Failure: Polycystic kidney disease (PKD)  Tx: DDKT  Transplant: 3/20/2014 (Kidney)  Significant changes in immunosuppression: None  Significant transplant-related complications: None    Recommendations were communicated to the primary team verbally.    Francisco Jordan MD  Pager: [phone]    REASON FOR CONSULT   Kidney transplant recipient, AMS    History of Present Illness   Elizabeth Palma is a 66-year-old female with a history of  donor kidney transplant 3/2014, baseline serum creatinine 0.9-1.1, multiple recent admissions to an outside hospital in 2022 due to E. coli pyelonephritis, COVID infection, then she proceeded to develop acute kidney injury for which she was given fluids, history of past PE on Eliquis, concern for recent falls, asked to present to the emergency room due to altered mental status, found to have continuing acute kidney injury and altered mental status which has improved this morning for whom transplant nephrology was consulted for assistance with management.    The patient states that she knows where she is, knows the year but it took her a while to get there, answer simple questions.  She states that she has  been feeling a little bit more confused the last few days, potentially since she had her gabapentin increased by her neurologist.  She has been complaining of some burning on urination.  She denies any diarrhea.  She does state that she has had a few falls recently and has hit her head.  She denies any loss of consciousness.  She is concerned because her feet are swollen and she has not been able to get into her shoes and states that her left foot is more swollen than her right.  She denies any fever, chills, shortness of breath, chest pain, headache, vision loss    Review of Systems    The 10 point Review of Systems is negative other than noted in the HPI or here.     Past Medical History    I have reviewed this patient's medical history and updated it with pertinent information if needed.   Past Medical History:   Diagnosis Date     Abnormal MRI, cervical spine 10/15/2011    2011; mild changes noted. Study done for left arm symptoms Impression:  1. Mild multilevel degenerative disc disease with no significant canal or neural stenosis seen. motion artifact on the STIR images in these are not interpretable. The remaining images were interpreted      Autosomal dominant polycystic kidney disease 2011     (Problem list name updated by automated process. Provider to review and confirm.)     [facility] DJD(carpometacarpal degenerative joint disease), localized primary 2013     -donor kidney transplant 2014     Gastroesophageal reflux disease      Generalized anxiety disorder 11/15/2012     Glaucoma      Hyperlipidemia 10/15/2011     Hyperparathyroidism, secondary (H) 2015     Hypertension     resolved     Immunosuppressed status (H) 2014     Major depressive disorder, recurrent episode, moderate (H) 11/15/2012     Obesity (BMI 30-39.9)      OP (osteoporosis) T score -3.8 2009 T-score -3.7      LION (obstructive sleep apnoea) 10/15/2012    reported intolerant to CPAP -- she  says she doesn't have LION     Pain in joint, forearm -- L unhealed Fx 05/21/2013     Premature menopause age 37 07/10/2012    OCP (vaginal bldg)-->HT which she stopped 2 mo later documented at Jan 12, 2007 visit (age 51).      Restless leg syndrome      Stiffness of joint, not elsewhere classified, hand 03/05/2013     Tremor 10/15/2011    head     Type 2 DM with Neuropathy 1985    started with gestational diabetes     Uncomplicated asthma        Past Surgical History   I have reviewed this patient's surgical history and updated it with pertinent information if needed.  Past Surgical History:   Procedure Laterality Date     ABDOMEN SURGERY       ANKLE SURGERY       C/SECTION, LOW TRANSVERSE      x 2     CHOLECYSTECTOMY  1990     COLONOSCOPY       COLONOSCOPY N/A 6/8/2022    Procedure: COLONOSCOPY, WITH POLYPECTOMY;  Surgeon: Leventhal, Thomas Michael, MD;  Location: [facility] [place] [place]     ESOPHAGOSCOPY, GASTROSCOPY, DUODENOSCOPY (EGD), COMBINED N/A 05/19/2015    Procedure: COMBINED ESOPHAGOSCOPY, GASTROSCOPY, DUODENOSCOPY (EGD);  Surgeon: Sky Davey MD;  Location:  GI     ESOPHAGOSCOPY, GASTROSCOPY, DUODENOSCOPY (EGD), COMBINED N/A 05/19/2015    Procedure: COMBINED ESOPHAGOSCOPY, GASTROSCOPY, DUODENOSCOPY (EGD), BIOPSY SINGLE OR MULTIPLE;  Surgeon: Sky Davey MD;  Location:  GI     EYE SURGERY       LAPAROSCOPY, SURGICAL; REPAIR INCISIONAL OR VENTRAL HERNIA       LASER SURGERY OF EYE Left 10/01/2020    sever vitreous strands     ORTHOPEDIC SURGERY       HERMINIA EN Y BOWEL  1990     WRIST SURGERY       Z TRANSPLANTATION OF KIDNEY  03/2014       Family History   I have reviewed this patient's family history and updated it with pertinent information if needed.   Family History   Problem Relation Age of Onset     Hyperlipidemia Mother      Diabetes Mother      Hypertension Mother      Genetic Disorder Father      Mental Illness Father      Diabetes Father      Hypertension Father      Mental Illness Other          family hx     Heart Disease Other      Diabetes Other      Cancer Other      Mental Illness Other      Diabetes Other      Glaucoma No family hx of      Macular Degeneration No family hx of        Social History   I have reviewed this patient's social history and updated it with pertinent information if needed. Elizabeth Palma  reports that she has never smoked. She has never used smokeless tobacco. She reports that she does not drink alcohol and does not use drugs.    Allergies   Allergies   Allergen Reactions     Percocet [Oxycodone-Acetaminophen] Nausea and Vomiting     Novocain [Procaine Hcl] Hives     Had reaction 25 years ago to old renetta. Pt reports multiple injections of lidocaine since then without reaction.  Tolerated lidocaine injection today without difficulty.  Osmar Mark MD IR Service.     Prior to Admission Medications     acetylcysteine  600 mg Oral BID     apixaban ANTICOAGULANT  5 mg Oral BID     ARIPiprazole  3 mg Oral BID     cefTRIAXone  2 g Intravenous Q24H     cycloSPORINE modified  125 mg Oral BID     ferrous sulfate  325 mg Oral Daily with breakfast     fluticasone  1 puff Inhalation Daily     [Held by provider] furosemide  20 mg Oral Daily     gabapentin  600 mg Oral At Bedtime     lactobacillus rhamnosus (GG)  1 capsule Oral Daily     latanoprost  1 drop Both Eyes At Bedtime     metFORMIN  1,500 mg Oral Daily with supper     mycophenolic acid  540 mg Oral BID     pantoprazole  40 mg Oral Daily     prazosin  17 mg Oral At Bedtime     propranolol  40 mg Oral BID     psyllium  5 capsule Oral QPM     simvastatin  20 mg Oral At Bedtime     sodium bicarbonate  650 mg Oral BID     sodium chloride (PF)  3 mL Intracatheter Q8H     sulfamethoxazole-trimethoprim  1 tablet Oral Daily     topiramate  200 mg Oral BID     vilazodone  50 mg Oral Daily       - MEDICATION INSTRUCTIONS -         Physical Exam   Temp  Av.7  F (36.5  C)  Min: 97  F (36.1  C)  Max: 98.6  F (37  C)      Pulse   "Av.7  Min: 48  Max: 57 Resp  Av.2  Min: 12  Max: 18  SpO2  Av.8 %  Min: 94 %  Max: 100 %     /65 (BP Location: Left arm)   Pulse 53   Temp 97.1  F (36.2  C) (Oral)   Resp 16   Ht 1.549 m (5' 0.98\")   Wt 85.8 kg (189 lb 2.5 oz)   LMP  (LMP Unknown)   SpO2 94%   BMI 35.76 kg/m      Admit Weight: 85.7 kg (188 lb 15 oz)     GENERAL APPEARANCE: alert and no distress  HENT: mouth without ulcers or lesions  LYMPHATICS: no cervical or supraclavicular nodes  RESP: lungs clear to auscultation - no rales, rhonchi or wheezes  CV: regular rhythm, normal rate, no rub, no murmur  EDEMA: no LE edema bilaterally  ABDOMEN: soft, nondistended, nontender, bowel sounds normal  MS: extremities normal - no gross deformities noted, no evidence of inflammation in joints, no muscle tenderness  SKIN: no rash  NEURO: normal strength and tone, sensory exam grossly normal, mentation intact and speech normal  PSYCH: mentation appears abnormal and she was slow to answer the year  TX KIDNEY: normal    Data   CMP  Recent Labs   Lab 22  0717 22  1852 22  0954 22  1736    136 142 146*   POTASSIUM 4.1 4.4 5.1 4.2   CHLORIDE 112* 106 110* 118*   CO2 17* 20* 22 19*   ANIONGAP 11 10 10 9   * 122* 158* 142*   BUN 14.5 16.2 16 15   CR 1.33* 1.45* 1.45* 1.08*   GFRESTIMATED 44* 40* 40* 57*   MARY 8.7* 9.5 9.1 7.0*   PROTTOTAL  --  5.4*  --   --    ALBUMIN  --  3.9  --   --    BILITOTAL  --  0.4  --   --    ALKPHOS  --  45  --   --    AST  --  18  --   --    ALT  --  18  --   --      CBC  Recent Labs   Lab 22  0717 22  0954   HGB 10.9* 11.6*   WBC 2.8* 4.6   RBC 3.61* 3.84   HCT 35.3 36.6   MCV 98 95   MCH 30.2 30.2   MCHC 30.9* 31.7   RDW 14.7 14.4   * 153     INRNo lab results found in last 7 days.  ABGNo lab results found in last 7 days.   Urine Studies  Recent Labs   Lab Test 22  1008 22  1715 21  1043 21  0958 10/29/20  1346 10/18/18  1045 " 10/18/18  0000   COLOR Yellow Light Yellow Yellow Yellow Yellow   < > Yellow   APPEARANCE Clear Clear Cloudy Slightly Cloudy Clear   < > Clear   URINEGLC Negative Negative Negative Negative 100*   < > Neg   URINEBILI Negative Negative Negative Negative Negative   < > Neg   URINEKETONE Negative Negative Negative 5* Negative   < > Other   SG 1.015 1.010 1.020 1.027 1.015   < > 1.020   UBLD Negative Negative Negative Negative Negative   < > Small   URINEPH 6.0 5.5 7.0 6.0 5.0   < > 7.0   PROTEIN Negative Negative Negative 20* Negative   < > Neg   UROBILINOGEN  --   --  0.2  --  0.2  --  0.2   NITRITE Negative Negative Negative Negative Negative   < > Pos   LEUKEST Moderate* Small* Trace* Large* Trace*   < > Mod   RBCU <1 1 O - 2 3* O - 2   < >  --    WBCU 14* 10* 0 - 5 >182* 0 - 5   < >  --     < > = values in this interval not displayed.     Recent Labs   Lab Test 06/16/22  0940 10/14/21  1109 05/13/21  0935 10/29/20  1346 02/03/20  1331 07/29/19  0847 01/22/19  0916 08/28/18  0947 03/12/18  1229 03/24/17  1025 09/23/16  0908 02/08/16  1112 07/07/15  1220 03/10/15  0921 09/16/14  0907   UTPG 0.27* 0.16 0.15 0.17 0.17 0.13 0.16 0.19 0.18 0.13 0.21* 0.11 0.24* 0.13 0.10     PTH  Recent Labs   Lab Test 05/27/21  1107 05/13/21  0923 10/30/18  0903 05/19/17  0928 04/19/16  0846 02/08/16  0919 06/02/15  0847 05/05/15  0854 02/25/15  1539 11/11/14  0849   PTHI 38 88* 85* 76* 107* 98* 113* 127* 133* 173*     Iron Studies  Recent Labs   Lab Test 02/24/20  1236 12/17/19  1507 06/02/15  0847 09/16/14  0853 05/19/14  0849   IRON 72 29*  --  57 30*    440*  --  408 312   IRONSAT 27 6*  --  14* 10*   KERI 128 4* 10 22 47       IMAGING:  All imaging studies reviewed by me.

## 2022-06-28 NOTE — PROGRESS NOTES
Vitals: Temp: 97.1  F (36.2  C) Temp src: Oral BP: 107/65 Pulse: 53   Resp: 16 SpO2: 94 % O2 Device: None (Room air)       Time: 0700- 1500  Reason for admission: AMS. UTI.  Activity:  Ax1 with gait belt and walker. Ax2 with in bed mobility, repositioning.   Pain:  Reports chronic pain to neck and low back, pain managed per MAR with oxycodone and warm packs.   Neuro: A&O x4. Able to make needs known. Pleasant and cooperative with cares.   Cardiac: Bradycardic, denies cardiac chest pain.   Respiratory:  WDL  GI/:  Denies N/V. Abd soft and non tender, voids spontaneously, no BM.   Diet:  regular diet, tolerating well. Good oral intake.   Lines:  (L) PIV saline locked, dressing c/d/i.   Incisions/Drains/Skin:  Large bruise on L hip to mid-thigh. Bruise on R breast. Scattered bruising on bilat arms.  Lab:  Reviewed.   Electrolyte Replacements: N/A  New changes this shift: No acute change.  at the bed side.

## 2022-06-28 NOTE — PROGRESS NOTES
"BP (!) 144/76 (BP Location: Left arm)   Pulse 54   Temp 97  F (36.1  C) (Oral)   Resp 18   Ht 1.549 m (5' 0.98\")   Wt 85.8 kg (189 lb 2.5 oz)   LMP  (LMP Unknown)   SpO2 97%   BMI 35.76 kg/m      Time: 6311-1973.  Reason for Admission: AMS. UTI.   Activity: Ax1-SBA w/ walker.   Neuro: A&O x4. Calls appropriately.  Cardiac: WDL ex bradycardic. Denies chest pain.   Respiratory: WDL on RA. LS clear. Denies SOB.   GI/: Voiding spontaneously, AUOP. +BS, + flatus. No BM this shift.   Diet: Regular.    Skin/Incisions/Drains: Large bruise on L hip to mid-thigh. Bruise on R breast. Scattered bruising on bilat arms.  Lines: L PIV SL.   Labs: Reviewed.   Pain/Nausea: C/o L neck & middle back pain, managed w/ PRN oxycodone. Denies nausea.   New changes this shift: X  Plan: Continue POC.         "

## 2022-06-28 NOTE — H&P
[facility]    History and Physical - Hospitalist Service, GOLD TEAM        Date of Admission:  6/27/2022    Assessment & Plan      Elizabeth Palma is a 66 year old woman admitted on 6/27/2022. She has a history of polycystic kidney disease s/p kidney transplant in 2014, diabetes type 2, hypertension, pulmonary embolus and hyperlipidemia admitted with worsening confusion and a UTI.    1) Acute on chronic encephalopathy, likely UTI - The patient reports chronic confusion after having COVID two months ago but with acutely worsened confusion over the past few days leading to a fall.  She reported urinary urgency to our ED provider but not to me.  Work up notable for: normal electrolytes, normal WBC, UA showing moderate leuk esterase, COVID negative, CT C spine and CT head notable C5-6 spondylosis but no acute fractures or bleed.  - Will start on ceftriaxone for potential UTI  - Gabapentin recent up-titrated from 600 qday to 300 TID, will move back to prior dosing.  - Consult PT/OT    2) History of renal transplant - Transplanted in 2014 secondary to polycystic kidney disease.  Cr quite labile but essentially at baseline today.  - US on admission notable for possible rejection.  Will consult transplant nephrology  - Continue mycophenolate, cyclosporine, bactrim    2) Peripheral edema   - Continue home lasix    3) History of glaucoma  - Continue home latanoprost    4) History of asthma  - Continue home levalbuterol    5) Chronic back pain  - Continue gabapentin as noted above    6) History of depression  - Continue home vilazodone    7) History of PE  - Continue home apixaban    8) DM II  - Continue home metformin     Diet:   Regular diet  DVT Prophylaxis: DOAC  Collier Catheter: Not present  Central Lines: None  Cardiac Monitoring: None  Code Status:   Full Code    Clinically Significant Risk Factors Present on Admission               # Coagulation Defect: home medication list  "includes an anticoagulant medication   # Hypertension: home medication list includes antihypertensive(s)    # Hypertension: home medication list includes antihypertensive(s)  # Obesity: Estimated body mass index is 35.7 kg/m  as calculated from the following:    Height as of this encounter: 1.549 m (5' 1\").    Weight as of this encounter: 85.7 kg (188 lb 15 oz).        Disposition Plan         The patient's care was discussed with the Attending Physician, Dr. Preciado.    GERHARD [facility]  Hospitalist Service, [facility]  Securely message with the Vocera Web Console (learn more here)  Text page via [facility] Paging/Directory   Please see signed in provider for up to date coverage information      ______________________________________________________________________    Chief Complaint   Worsening confusion    History is obtained from the patient    History of Present Illness   Elizabeth Palma is a 66 year old woman admitted on 6/27/2022. She has a history of polycystic kidney disease s/p kidney transplant in 2014, diabetes type 2, hypertension, pulmonary embolus and hyperlipidemia admitted with worsening confusion and a UTI.    The patient reports she has some chronic confusion ever since she had COVID 2 months ago.  Over the past week however she has noticed increased confusion.  She feels she is more unsteady and had 1 fall where she struck her head.  At the times her  has been able to catch her.  Otherwise she notes some worsening of her lower back as well as neck pain.    She reported urinary frequency to our emergency department team but denied any urinary changes for me.    Her main concern is that she feels she is often performing activity and can no longer recall why she was doing it.    Review of Systems    The 10 point Review of Systems is negative other than noted in the HPI or here.     Past Medical History    I have reviewed this " patient's medical history and updated it with pertinent information if needed.   Past Medical History:   Diagnosis Date     Abnormal MRI, cervical spine 10/15/2011    2011; mild changes noted. Study done for left arm symptoms Impression:  1. Mild multilevel degenerative disc disease with no significant canal or neural stenosis seen. motion artifact on the STIR images in these are not interpretable. The remaining images were interpreted      Autosomal dominant polycystic kidney disease 2011     (Problem list name updated by automated process. Provider to review and confirm.)     CMC DJD(carpometacarpal degenerative joint disease), localized primary 2013     -donor kidney transplant 2014     Gastroesophageal reflux disease      Generalized anxiety disorder 11/15/2012     Glaucoma      Hyperlipidemia 10/15/2011     Hyperparathyroidism, secondary (H) 2015     Hypertension     resolved     Immunosuppressed status (H) 2014     Major depressive disorder, recurrent episode, moderate (H) 11/15/2012     Obesity (BMI 30-39.9)      OP (osteoporosis) T score -3.8 2009 T-score -3.7      LION (obstructive sleep apnoea) 10/15/2012    reported intolerant to CPAP -- she says she doesn't have LION     Pain in joint, forearm -- L unhealed Fx 2013     Premature menopause age 37 07/10/2012    OCP (vaginal bldg)-->HT which she stopped 2 mo later documented at 2007 visit (age 51).      Restless leg syndrome      Stiffness of joint, not elsewhere classified, hand 2013     Tremor 10/15/2011    head     Type 2 DM with Neuropathy 1985    started with gestational diabetes     Uncomplicated asthma        Past Surgical History   I have reviewed this patient's surgical history and updated it with pertinent information if needed.  Past Surgical History:   Procedure Laterality Date     ABDOMEN SURGERY       ANKLE SURGERY       C/SECTION, LOW TRANSVERSE      x 2      CHOLECYSTECTOMY  1990     COLONOSCOPY       COLONOSCOPY N/A 6/8/2022    Procedure: COLONOSCOPY, WITH POLYPECTOMY;  Surgeon: Leventhal, Thomas Michael, MD;  Location: [facility] [place]     ESOPHAGOSCOPY, GASTROSCOPY, DUODENOSCOPY (EGD), COMBINED N/A 05/19/2015    Procedure: COMBINED ESOPHAGOSCOPY, GASTROSCOPY, DUODENOSCOPY (EGD);  Surgeon: Sky Davey MD;  Location:  GI     ESOPHAGOSCOPY, GASTROSCOPY, DUODENOSCOPY (EGD), COMBINED N/A 05/19/2015    Procedure: COMBINED ESOPHAGOSCOPY, GASTROSCOPY, DUODENOSCOPY (EGD), BIOPSY SINGLE OR MULTIPLE;  Surgeon: Sky Davey MD;  Location:  GI     EYE SURGERY       LAPAROSCOPY, SURGICAL; REPAIR INCISIONAL OR VENTRAL HERNIA       LASER SURGERY OF EYE Left 10/01/2020    sever vitreous strands     ORTHOPEDIC SURGERY       HERMINIA EN Y BOWEL  1990     WRIST SURGERY       [facility] TRANSPLANTATION OF KIDNEY  03/2014       Social History   I have reviewed this patient's social history and updated it with pertinent information if needed.  Social History     Tobacco Use     Smoking status: Never Smoker     Smokeless tobacco: Never Used   Substance Use Topics     Alcohol use: No     Alcohol/week: 0.0 standard drinks     Drug use: No       Family History   I have reviewed this patient's family history and updated it with pertinent information if needed.  Family History   Problem Relation Age of Onset     Hyperlipidemia Mother      Diabetes Mother      Hypertension Mother      Genetic Disorder Father      Mental Illness Father      Diabetes Father      Hypertension Father      Mental Illness Other         family hx     Heart Disease Other      Diabetes Other      Cancer Other      Mental Illness Other      Diabetes Other      Glaucoma No family hx of      Macular Degeneration No family hx of        Prior to Admission Medications   Prior to Admission Medications   Prescriptions Last Dose Informant Patient Reported? Taking?   ARIPiprazole (ABILIFY) 2 MG tablet   No No   Sig: Take 1.5  tablets (3 mg) by mouth 2 times daily   LORazepam (ATIVAN) 0.5 MG tablet   No No   Sig: Take 1 tab (0.5mg) twice weekly as need for anxiety   Lactobacillus (ACIDOPHILUS) 100 MG CAPS   Yes No   Sig: Take 100 mg by mouth daily   Polyvinyl Alcohol-Povidone (REFRESH OP)   Yes No   Sig: Apply to eye as needed Both eyes   acetaminophen (TYLENOL) 500 MG tablet   Yes No   Sig: Take 1,500 mg by mouth every 6 hours as needed for mild pain   acetylcysteine (N-ACETYL CYSTEINE) 600 MG CAPS capsule   No No   Sig: Take 1 capsule (600 mg) by mouth 2 times daily (Patient does not know what strength they are taking.)   apixaban ANTICOAGULANT (ELIQUIS) 5 MG tablet   Yes No   Sig: Take 1 tablet (5 mg) by mouth 2 times daily   ascorbic acid (VITAMIN C) 250 MG CHEW chewable tablet   Yes No   Sig: Take 2 tablets (500 mg) by mouth daily   beclomethasone HFA (QVAR REDIHALER) 40 MCG/ACT inhaler   Yes No   Sig: Inhale 1 puff into the lungs 2 times daily   cycloSPORINE modified (GENERIC EQUIVALENT) 25 MG capsule   No No   Sig: Take 5 capsules (125 mg) by mouth 2 times daily TAKE 5 CAPSULES (125MG) BY MOUTH TWO TIMES A DAY   denosumab (PROLIA) 60 MG/ML SOSY injection   Yes No   Sig: Inject 60 mg Subcutaneous   ferrous sulfate (FEROSUL) 325 (65 Fe) MG tablet   No No   Sig: Take 1 tablet (325 mg) by mouth daily (with breakfast)   fluticasone (FLONASE) 50 MCG/ACT nasal spray   No No   Sig: Spray 1 spray into both nostrils daily   Patient taking differently: Spray 1 spray into both nostrils daily as needed   fluticasone (FLOVENT HFA) 220 MCG/ACT inhaler   Yes No   Sig: Inhale 1 puff into the lungs   furosemide (LASIX) 20 MG tablet   No No   Sig: Take 1 tablet (20 mg) by mouth daily   gabapentin (NEURONTIN) 300 MG capsule   No No   Sig: Take 2 capsules (600 mg) by mouth At Bedtime   latanoprost (XALATAN) 0.005 % ophthalmic solution   No No   Sig: Place 1 drop into both eyes At Bedtime   levalbuterol (XOPENEX HFA) 45 MCG/ACT inhaler   No No   Sig:  Inhale 2 puffs into the lungs every 6 hours as needed for shortness of breath / dyspnea or wheezing   metFORMIN (GLUCOPHAGE XR) 500 MG 24 hr tablet   No No   Sig: Take 3 tablets (1,500 mg) by mouth daily (with dinner)   mycophenolic acid (GENERIC EQUIVALENT) 180 MG EC tablet   Yes No   Sig: Take 2 tablets (360 mg) by mouth 2 times daily   mycophenolic acid (GENERIC EQUIVALENT) 180 MG EC tablet   No No   Sig: Take 3 tablets (540 mg) by mouth 2 times daily for 90 days   ondansetron (ZOFRAN-ODT) 4 MG ODT tab   No No   Sig: Take 1 tablet (4 mg) by mouth every 6 hours as needed for nausea   order for DME   No No   Sig: Walker with front wheels and a seat.   pantoprazole (PROTONIX) 40 MG EC tablet   No No   Sig: Take 1 tablet (40 mg) by mouth daily   prazosin (MINIPRESS) 5 MG capsule   No No   Sig: Take 3 x 5mg (15mg) caps + 1 x 2mg caps at bedtime (total dose=17mg)   propranolol (INDERAL) 20 MG tablet   Yes No   Sig: Take 2 tablets (40 mg) by mouth 2 times daily Afternoon & evening.   psyllium (METAMUCIL/KONSYL) capsule   Yes No   Sig: Take 5 capsules by mouth every evening With supper.   simvastatin (ZOCOR) 20 MG tablet   Yes No   Sig: Take 1 tablet (20 mg) by mouth At Bedtime   sulfamethoxazole-trimethoprim (BACTRIM) 400-80 MG tablet   No No   Sig: Take 1 tablet by mouth daily   topiramate (TOPAMAX) 200 MG tablet   No No   Sig: Take 1 tablet (200 mg) by mouth 2 times daily   vilazodone (VIIBRYD) 10 MG TABS tablet   No No   Sig: Take 10mg tab with 40mg tab for total daily dose of 50 mg   vilazodone (VIIBRYD) 40 MG TABS tablet   No No   Sig: Take 40mg tab with 10mg tab for total daily dose of 50 mg      Facility-Administered Medications Last Administration Doses Remaining   botulinum toxin type A (BOTOX) 100 units injection 100 Units None recorded 4        Allergies   Allergies   Allergen Reactions     Percocet [Oxycodone-Acetaminophen] Nausea and Vomiting     Novocain [Procaine Hcl] Hives     Had reaction 25 years ago  to old renetta. Pt reports multiple injections of lidocaine since then without reaction.  Tolerated lidocaine injection today without difficulty.  Osmar Mark MD IR Service.       Physical Exam   Vital Signs: Temp: 97.4  F (36.3  C) Temp src: Oral BP: 129/78 Pulse: 50   Resp: 16 SpO2: 100 % O2 Device: None (Room air)    Weight: 188 lbs 14.95 oz    Physical Exam   Constitutional:   Well nourished, well developed, resting comfortably   Head: Normocephalic and atraumatic.   Eyes: Conjunctivae are normal. Pupils are equal, round, and reactive to light.    Oropharynx: Pharynx has no erythema or exudate, mucous membranes are moist  Neck:   No adenopathy, no bony tenderness  Cardiovascular: Regular rate and rhythm without murmurs or gallops  Pulmonary/Chest: Clear to auscultation bilaterally, with no wheezes or retractions. No respiratory distress.  GI: Soft with good bowel sounds.  Non-tender, non-distended, with no guarding, no rebound, no peritoneal signs.   Musculoskeletal:  3+ BLE edema  Skin: Skin is warm and dry. No rash noted.   Neurological: Generally weak with a mild tremor in her neck.  Alert and oriented to person, place, and time. Nonfocal exam.   Psychiatric:  Normal mood and affect.      Data   Data reviewed today: I reviewed all medications, new labs and imaging results over the last 24 hours.

## 2022-06-28 NOTE — PROGRESS NOTES
"   06/28/22 1132   Quick Adds   Type of Visit Initial PT Evaluation       Present no   Living Environment   People in Home spouse   Current Living Arrangements condominium   Home Accessibility no concerns  (elevator access)   Transportation Anticipated family or friend will provide;car, drives self   Living Environment Comments Pt lives in condo with . Elevator access. Pt reports walk in shower, built in shower seat and grab bars.   Self-Care   Usual Activity Tolerance moderate   Current Activity Tolerance fair   Regular Exercise No   Equipment Currently Used at Home grab bar, tub/shower;raised toilet seat  (4WW)   Fall history within last six months yes   Number of times patient has fallen within last six months 2   Activity/Exercise/Self-Care Comment Pt reports using 4WW vs holding her 's hand with mobility at home. Pt IND with dressing and showering.  does cooking and cleaning. Pt reports a couple falls recently, though reports her  has \"caught her a couple times\".   General Information   Onset of Illness/Injury or Date of Surgery 06/27/22   Referring Physician Stevan Poole APRN CNP   Patient/Family Therapy Goals Statement (PT) to return home   Pertinent History of Current Problem (include personal factors and/or comorbidities that impact the POC) Per EMR: \"Elizabeth Palma is a 66 year old woman admitted on 6/27/2022. She has a history of polycystic kidney disease s/p kidney transplant in 2014, diabetes type 2, hypertension, pulmonary embolus and hyperlipidemia admitted with worsening confusion and a UTI.\"   Existing Precautions/Restrictions fall   Weight-Bearing Status - LUE full weight-bearing   Weight-Bearing Status - RUE full weight-bearing   Weight-Bearing Status - LLE full weight-bearing   Weight-Bearing Status - RLE full weight-bearing   General Observations Activity: up with assist   Cognition   Affect/Mental Status (Cognition) WFL;confused " "  Orientation Status (Cognition) oriented x 4   Safety Deficit (Cognition) minimal deficit;insight into deficits/self-awareness   Cognitive Status Comments Pt oriented x4. Reports some confusion since having covid. Reports brain feels \"foggy\".   Pain Assessment   Patient Currently in Pain Yes, see Vital Sign flowsheet  (back pain)   Integumentary/Edema   Integumentary/Edema no deficits were identifed   Integumentary/Edema Comments L thigh bruising from previous fall   Posture    Posture Forward head position;Protracted shoulders   Range of Motion (ROM)   ROM Comment B LE WFL   Strength (Manual Muscle Testing)   Strength Comments B LE grossly 3+/5, functional/generalized weakness   Bed Mobility   Comment, (Bed Mobility) sup to sit with CGA   Transfers   Comment, (Transfers) sit to stand with SBA-CGA   Gait/Stairs (Locomotion)   Comment, (Gait/Stairs) Pt ambulates 15ft in room with FWW and Lex-CGA.   Balance   Balance Comments Pt demos good seated balance at EOB. Impaired standing/dynamic balance, uses walker for support   Sensory Examination   Sensory Perception patient reports no sensory changes   Coordination   Coordination no deficits were identified   Muscle Tone   Muscle Tone no deficits were identified   Clinical Impression   Criteria for Skilled Therapeutic Intervention Yes, treatment indicated   PT Diagnosis (PT) impaired functional mobility   Influenced by the following impairments pain, weakness, impaired balance, confusion, decreased endurance/activity tolerance   Functional limitations due to impairments bed mobility, transfers, gait   Clinical Presentation (PT Evaluation Complexity) Stable/Uncomplicated   Clinical Presentation Rationale clinical judgement   Clinical Decision Making (Complexity) low complexity   Planned Therapy Interventions (PT) balance training;bed mobility training;gait training;home exercise program;neuromuscular re-education;patient/family education;stair " training;strengthening;stretching;transfer training;progressive activity/exercise;home program guidelines   Anticipated Equipment Needs at Discharge (PT)   (TBD)   Risk & Benefits of therapy have been explained evaluation/treatment results reviewed;care plan/treatment goals reviewed   Clinical Impression Comments Pt presents with the aformentioned impairments affecting safety and IND with functional mobility and activity tolerance. Pt to benefit from skilled IP PT in order to address these impairments and facilitate safe discharge plan.   PT Discharge Planning   PT Discharge Recommendation (DC Rec) Transitional Care Facility;home with assist;home with home care physical therapy   PT Rationale for DC Rec Pt presenting below functional mobility baseline, with recent falls. Pt would benefit from TCU to progress strength, safety, and activity tolerance to decresae risk for falls and readmission, prior to d/c home. Pt declining TCU at this time, wanting to d/c home with . Pt agreeable to home PT. If d/c home, would recommend 24 hour assist for mobility as needed and home therapies.   PT Brief overview of current status Ax1 with FWW and gait belt   Plan of Care Review   Plan of Care Reviewed With patient   Total Evaluation Time   Total Evaluation Time (Minutes) 12   Physical Therapy Goals   PT Frequency 5x/week   PT Predicted Duration/Target Date for Goal Attainment 07/08/22   PT Goals Bed Mobility;Transfers;Gait   PT: Bed Mobility Modified independent;Supine to/from sit   PT: Transfers Modified independent;Sit to/from stand;Bed to/from chair;Assistive device   PT: Gait Modified independent;Assistive device;Greater than 200 feet

## 2022-06-28 NOTE — PROGRESS NOTES
Admitted/transferred from: ED     2 RN full   skin assessment completed by Magalie Barillas, RN and Sushil Aj RN.    Skin assessment finding: issues found large bruise on left hip to mid-thigh, scattered bruising on both arms, small bruise on back x2, bruise on right breast & bruising on abd.      Interventions/actions: skin interventions promote ambulation, maintain adequate hydration.      Will continue to monitor.

## 2022-06-28 NOTE — PROGRESS NOTES
[facility]    Medicine Progress Note - Hospitalist Service, GOLD TEAM 10    Date of Admission:  6/27/2022    Assessment & Plan            Elizabeth Palma is a 66 year old woman admitted on 6/27/2022. She has a history of polycystic kidney disease s/p kidney transplant in 2014, T2DM, HTN, pulmonary embolus on anticoagulation admitted with worsening confusion, fatigue and concern for possible UTI.    # Acute on chronic encephalopathy  # Suspected UTI   Pt reports chronic confusion after having COVID two months ago but with acutely worsened confusion over the past few days leading to a fall.  Work up in ED notable for: normal electrolytes, normal WBC, UA showing moderate leuk esterase, COVID negative, CT C spine and CT head notable C5-6 spondylosis but no acute fractures or bleed. Did report some urinary frequency which has persisted overnight. Otherwise, mental status improved by 6/28 (day after admission) but patient continues to endorse significant fatigue. Of note, patient has a tremor in her hands and more recently tremor of head noted (saw Neurology for this on 6/22). Only recent med change was increasing dose of Gabapentin for management of tremor.  - Await urine culture results, BCx with NGTD  - Continue CTX for empiric treatment of UTI (started 6/27-)  - Decreased dose of sedating medications (I.e. Gabapentin)  - PT/OT consults pending - recommending discharge to TCU pt would like to return home    # History of renal transplant   Transplanted in 2014 due to polycystic kidney disease.  Cr quite labile per chart review. Of note, US on admission notable for possible rejection given elevated resistive indices  - Will consult transplant nephrology, ?whether subacute consitutional symptoms might related to developing rejection vs. Incidental finding, though no tenderness appreciated over allograft site  - Continue mycophenolate, cyclosporine, bactrim    # Peripheral edema  "  - Continue home lasix    # History of glaucoma  - Continue home latanoprost    # History of asthma  - Continue home levalbuterol    # Chronic back pain  - Continue gabapentin as noted above (lower dose due to increased confusion leading to admission)    # History of depression  - Continue home vilazodone    # History of PE  - Continue home apixaban    # DM II  - Continue home metformin       Diet: Combination Diet Regular Diet Adult    DVT Prophylaxis: DOAC  Collier Catheter: Not present  Central Lines: None  Cardiac Monitoring: None  Code Status: Full Code      Disposition Plan      Expected Discharge Date: 06/30/2022                The patient's care was discussed with the Bedside Nurse and Patient.    Mary Jo Olmedo MD  Hospitalist Service, [address]  Securely message with the Vocera Web Console (learn more here)  Text page via [employer] Paging/Directory   Please see signed in provider for up to date coverage information      Clinically Significant Risk Factors Present on Admission               # Coagulation Defect: home medication list includes an anticoagulant medication   # Hypertension: home medication list includes antihypertensive(s)    # Hypertension: home medication list includes antihypertensive(s)  # Obesity: Estimated body mass index is 35.76 kg/m  as calculated from the following:    Height as of this encounter: 1.549 m (5' 0.98\").    Weight as of this encounter: 85.8 kg (189 lb 2.5 oz).        ______________________________________________________________________    Interval History   Doing OK today. Still has tremors present in her hands and her head, states this has not changed recently. Also reports her confusion is better, though remains quite fatigued and tired which is new for her.  corroborates story of several days of significant fatigue in days leading up to admission. Also reported history of a fall with injury to her low " back and left thigh. Otherwise today she feels like her thinking has returned to normal. Able to walk with PT and remained stable. Also reports decreased urine output with some urgency but no dysuria. Her current presentation does not feel similar to prior episodes of UTI she has had in the past.    Data reviewed today: I reviewed all medications, new labs and imaging results over the last 24 hours. I personally reviewed no images or EKG's today.    Physical Exam   Vital Signs: Temp: 97  F (36.1  C) Temp src: Oral BP: (!) 144/76 Pulse: 54   Resp: 18 SpO2: 97 % O2 Device: None (Room air)    Weight: 189 lbs 2.47 oz  General Appearance: Pleasant, conversant female, appears older than stated age. Tremor of hands and head noted.  Respiratory: Breathing comfortably on room air, lungs CTAB  Cardiovascular: RRR, no m/r/g.  GI: soft, non-distended, non-tender to palpation. No tenderness with palpation over allograft site  Skin: no skin rash  Other: Trace LE edema bilaterally.     Data   Recent Labs   Lab 06/28/22  0717 06/27/22  1852 06/27/22  0954   WBC 2.8*  --  4.6   HGB 10.9*  --  11.6*   MCV 98  --  95   *  --  153    136 142   POTASSIUM 4.1 4.4 5.1   CHLORIDE 112* 106 110*   CO2 17* 20* 22   BUN 14.5 16.2 16   CR 1.33* 1.45* 1.45*   ANIONGAP 11 10 10   MARY 8.7* 9.5 9.1   * 122* 158*   ALBUMIN  --  3.9  --    PROTTOTAL  --  5.4*  --    BILITOTAL  --  0.4  --    ALKPHOS  --  45  --    ALT  --  18  --    AST  --  18  --

## 2022-06-29 ENCOUNTER — PRE VISIT (OUTPATIENT)
Dept: ORTHOPEDICS | Facility: CLINIC | Age: 67
End: 2022-06-29
Payer: MEDICARE

## 2022-06-29 ENCOUNTER — APPOINTMENT (OUTPATIENT)
Dept: OCCUPATIONAL THERAPY | Facility: CLINIC | Age: 67
DRG: 689 | End: 2022-06-29
Attending: NURSE PRACTITIONER
Payer: MEDICARE

## 2022-06-29 VITALS
SYSTOLIC BLOOD PRESSURE: 117 MMHG | RESPIRATION RATE: 16 BRPM | HEIGHT: 61 IN | WEIGHT: 189.15 LBS | DIASTOLIC BLOOD PRESSURE: 63 MMHG | HEART RATE: 52 BPM | TEMPERATURE: 98.3 F | BODY MASS INDEX: 35.71 KG/M2 | OXYGEN SATURATION: 95 %

## 2022-06-29 LAB
ANION GAP SERPL CALCULATED.3IONS-SCNC: 10 MMOL/L (ref 7–15)
BASOPHILS # BLD AUTO: 0 10E3/UL (ref 0–0.2)
BASOPHILS NFR BLD AUTO: 1 %
BUN SERPL-MCNC: 13.6 MG/DL (ref 8–23)
CALCIUM SERPL-MCNC: 8.9 MG/DL (ref 8.8–10.2)
CHLORIDE SERPL-SCNC: 109 MMOL/L (ref 98–107)
CMV DNA SPEC NAA+PROBE-ACNC: <137 IU/ML
CMV DNA SPEC NAA+PROBE-LOG#: <2.1 {LOG_COPIES}/ML
CREAT SERPL-MCNC: 1.36 MG/DL (ref 0.51–0.95)
DEPRECATED HCO3 PLAS-SCNC: 20 MMOL/L (ref 22–29)
EOSINOPHIL # BLD AUTO: 0.3 10E3/UL (ref 0–0.7)
EOSINOPHIL NFR BLD AUTO: 10 %
ERYTHROCYTE [DISTWIDTH] IN BLOOD BY AUTOMATED COUNT: 14.7 % (ref 10–15)
GFR SERPL CREATININE-BSD FRML MDRD: 43 ML/MIN/1.73M2
GLUCOSE SERPL-MCNC: 139 MG/DL (ref 70–99)
HCT VFR BLD AUTO: 35.9 % (ref 35–47)
HGB BLD-MCNC: 11 G/DL (ref 11.7–15.7)
HOLD SPECIMEN: NORMAL
IMM GRANULOCYTES # BLD: 0 10E3/UL
IMM GRANULOCYTES NFR BLD: 1 %
LYMPHOCYTES # BLD AUTO: 0.5 10E3/UL (ref 0.8–5.3)
LYMPHOCYTES NFR BLD AUTO: 14 %
MCH RBC QN AUTO: 29.8 PG (ref 26.5–33)
MCHC RBC AUTO-ENTMCNC: 30.6 G/DL (ref 31.5–36.5)
MCV RBC AUTO: 97 FL (ref 78–100)
MONOCYTES # BLD AUTO: 0.3 10E3/UL (ref 0–1.3)
MONOCYTES NFR BLD AUTO: 8 %
MYCOPHENOLATE SERPL LC/MS/MS-MCNC: 0.88 MG/L (ref 1–3.5)
MYCOPHENOLATE-G SERPL LC/MS/MS-MCNC: 106.4 MG/L (ref 30–95)
NEUTROPHILS # BLD AUTO: 2.3 10E3/UL (ref 1.6–8.3)
NEUTROPHILS NFR BLD AUTO: 66 %
NRBC # BLD AUTO: 0 10E3/UL
NRBC BLD AUTO-RTO: 0 /100
PLATELET # BLD AUTO: 140 10E3/UL (ref 150–450)
POTASSIUM SERPL-SCNC: 4.2 MMOL/L (ref 3.4–5.3)
RBC # BLD AUTO: 3.69 10E6/UL (ref 3.8–5.2)
SODIUM SERPL-SCNC: 139 MMOL/L (ref 136–145)
TACROLIMUS BLD-MCNC: <1 UG/L (ref 5–15)
TME LAST DOSE: ABNORMAL H
WBC # BLD AUTO: 3.4 10E3/UL (ref 4–11)

## 2022-06-29 PROCEDURE — 250N000013 HC RX MED GY IP 250 OP 250 PS 637: Performed by: INTERNAL MEDICINE

## 2022-06-29 PROCEDURE — 85025 COMPLETE CBC W/AUTO DIFF WBC: CPT | Performed by: INTERNAL MEDICINE

## 2022-06-29 PROCEDURE — 99233 SBSQ HOSP IP/OBS HIGH 50: CPT | Performed by: INTERNAL MEDICINE

## 2022-06-29 PROCEDURE — 80048 BASIC METABOLIC PNL TOTAL CA: CPT | Performed by: INTERNAL MEDICINE

## 2022-06-29 PROCEDURE — 80180 DRUG SCRN QUAN MYCOPHENOLATE: CPT | Performed by: INTERNAL MEDICINE

## 2022-06-29 PROCEDURE — 97530 THERAPEUTIC ACTIVITIES: CPT | Mod: GO

## 2022-06-29 PROCEDURE — 97165 OT EVAL LOW COMPLEX 30 MIN: CPT | Mod: GO

## 2022-06-29 PROCEDURE — 36415 COLL VENOUS BLD VENIPUNCTURE: CPT | Performed by: INTERNAL MEDICINE

## 2022-06-29 PROCEDURE — 80197 ASSAY OF TACROLIMUS: CPT | Performed by: INTERNAL MEDICINE

## 2022-06-29 PROCEDURE — 99239 HOSP IP/OBS DSCHRG MGMT >30: CPT | Performed by: INTERNAL MEDICINE

## 2022-06-29 PROCEDURE — 97535 SELF CARE MNGMENT TRAINING: CPT | Mod: GO

## 2022-06-29 PROCEDURE — 87799 DETECT AGENT NOS DNA QUANT: CPT | Performed by: INTERNAL MEDICINE

## 2022-06-29 PROCEDURE — 250N000012 HC RX MED GY IP 250 OP 636 PS 637: Performed by: NURSE PRACTITIONER

## 2022-06-29 PROCEDURE — 250N000013 HC RX MED GY IP 250 OP 250 PS 637: Performed by: NURSE PRACTITIONER

## 2022-06-29 RX ORDER — SODIUM BICARBONATE 650 MG/1
650 TABLET ORAL 2 TIMES DAILY
Qty: 60 TABLET | Refills: 1 | Status: SHIPPED | OUTPATIENT
Start: 2022-06-29

## 2022-06-29 RX ORDER — CEFPODOXIME PROXETIL 200 MG/1
200 TABLET, FILM COATED ORAL 2 TIMES DAILY
Qty: 10 TABLET | Refills: 0 | Status: SHIPPED | OUTPATIENT
Start: 2022-06-29

## 2022-06-29 RX ADMIN — SULFAMETHOXAZOLE AND TRIMETHOPRIM 1 TABLET: 400; 80 TABLET ORAL at 08:07

## 2022-06-29 RX ADMIN — CYCLOSPORINE 125 MG: 100 CAPSULE, LIQUID FILLED ORAL at 08:08

## 2022-06-29 RX ADMIN — FLUTICASONE FUROATE 1 PUFF: 100 POWDER RESPIRATORY (INHALATION) at 08:08

## 2022-06-29 RX ADMIN — Medication 600 MG: at 08:08

## 2022-06-29 RX ADMIN — MYCOPHENOLIC ACID 540 MG: 360 TABLET, DELAYED RELEASE ORAL at 08:06

## 2022-06-29 RX ADMIN — PROPRANOLOL HYDROCHLORIDE 40 MG: 20 TABLET ORAL at 08:15

## 2022-06-29 RX ADMIN — TOPIRAMATE 200 MG: 100 TABLET, FILM COATED ORAL at 08:07

## 2022-06-29 RX ADMIN — APIXABAN 5 MG: 5 TABLET, FILM COATED ORAL at 08:06

## 2022-06-29 RX ADMIN — OXYCODONE HYDROCHLORIDE 2.5 MG: 5 TABLET ORAL at 04:33

## 2022-06-29 RX ADMIN — PANTOPRAZOLE SODIUM 40 MG: 40 TABLET, DELAYED RELEASE ORAL at 08:07

## 2022-06-29 RX ADMIN — SODIUM BICARBONATE 650 MG: 650 TABLET ORAL at 08:07

## 2022-06-29 RX ADMIN — FERROUS SULFATE TAB 325 MG (65 MG ELEMENTAL FE) 325 MG: 325 (65 FE) TAB at 08:07

## 2022-06-29 RX ADMIN — Medication 3 MG: at 08:06

## 2022-06-29 RX ADMIN — VILAZODONE HYDROCHLORIDE 50 MG: 40 TABLET ORAL at 08:07

## 2022-06-29 RX ADMIN — Medication 1 CAPSULE: at 08:07

## 2022-06-29 ASSESSMENT — ACTIVITIES OF DAILY LIVING (ADL)
ADLS_ACUITY_SCORE: 31
DEPENDENT_IADLS:: INDEPENDENT
ADLS_ACUITY_SCORE: 31
ADLS_ACUITY_SCORE: 31
PREVIOUS_RESPONSIBILITIES: DRIVING

## 2022-06-29 NOTE — PROGRESS NOTES
[facility]  Transplant Nephrology Follow Up  Date of Admission:  6/27/2022  Today's Date: 06/29/2022  Requesting physician: Mary Jo Olmedo MD    Recommendations:  -continue to hold lasix as she only has pedal edema and has CORA  -ok to discharge on cefpodoxime to complete total 7d course  -continue bicarb 650mg bid as outpatient  -continue decreased dose of gabapentin as outpatient    Assessment & Plan   # DDKT: CORA but Scr downtrending to 1.3, possibly 2/2 volume depletion as she continued to take lasix despite having very little edema   - Baseline Creatinine: ~ 0.9-1.1   - Proteinuria: Normal (<0.2 grams)   - Date DSA Last Checked: Nov/2019      Latest DSA: No   - BK Viremia: No   - Kidney Tx Biopsy: Apr 16, 2014; Result: mild tubular epithelial injury with concern for acute CNI toxicity. Mild arteriosclerosis and arterial hyalinosis    -Hold lasix, treat possible UTI w/ ceftriaxone->cefpodoxime as outpatient to complete 7d course   -Biopsy for Scr >or=to 1.5       # Immunosuppression: Cyclosporine (goal ) and Mycophenolic acid (dose 540 mg every 12 hours)   - Changes: Not at this time. Repeat MPA level as outpatient    # Infection Prophylaxis:   - PJP: Sulfa/TMP (Bactrim)    # Hypertension: Controlled, but low at times;  Goal BP: > 100, but < 130 systolic   - Volume status: possibly slightly extravascularly overloaded, only with pedal edema     - Changes: Yes - hold lasix, consider decreasing or holding prazosin and propranolol with HR 53 and  systolic    # Previous PE:   -Continue eliquis    # Leukopenia:   -Follow up CMV and EBV PCR     # Diabetes: Borderline control (HbA1c 7-9%)           Last HbA1c: 7.3%              - Management as per primary team. On metformin 1500mg dialy     # Anemia in Chronic Renal Disease: Hgb: Stable      RAYMUNDO: No   - Iron studies: Not checked recently    # Mineral Bone Disorder:   - Secondary renal hyperparathyroidism;  "PTH level: Minimally elevated ( pg/ml)        On treatment: None  - Vitamin D; level: Not checked recently, but was normal last check        On supplement: Yes  - Calcium; level: Low normal        On supplement: No  - Phosphorus; level: Not checked recently        On supplement: No    # Electrolytes:   - Potassium; level: Normal        On supplement: No  - Magnesium; level: Not checked recently        On supplement: No  - Bicarbonate; level: Low        On supplement: Yes, continue bicarb 650mg bid    # Altered mental status:    -Appears resolved   -continue decreased dose of gabapentin as outpatient    # Pyuria:   -Concern for pyelonephritis due to recurrent issue with AMS, U/A w/ 14 WBCs   -Ok to transition to cefpodoxime to complete 7d course      # Transplant History:  Etiology of Kidney Failure: Polycystic kidney disease (PKD)  Tx: DDKT  Transplant: 3/20/2014 (Kidney)  Significant changes in immunosuppression: None  Significant transplant-related complications: None    Recommendations were communicated to the primary team verbally.    Francisco Jordan MD  Pager: [phone]    Interval events:  -Patient states that she feels \"much better\". She denies being confused anymore and feels quite mobile.   -She would like to be discharged today    Review of Systems    The 4 point Review of Systems is negative other than noted above or here.     Past Medical History    I have reviewed this patient's medical history and updated it with pertinent information if needed.   Past Medical History:   Diagnosis Date     Abnormal MRI, cervical spine 10/15/2011    4/2011; mild changes noted. Study done for left arm symptoms Impression:  1. Mild multilevel degenerative disc disease with no significant canal or neural stenosis seen. motion artifact on the STIR images in these are not interpretable. The remaining images were interpreted      Autosomal dominant polycystic kidney disease 07/05/2011     (Problem list name updated by " automated process. Provider to review and confirm.)     CMC DJD(carpometacarpal degenerative joint disease), localized primary 2013     -donor kidney transplant 2014     Gastroesophageal reflux disease      Generalized anxiety disorder 11/15/2012     Glaucoma      Hyperlipidemia 10/15/2011     Hyperparathyroidism, secondary (H) 2015     Hypertension     resolved     Immunosuppressed status (H) 2014     Major depressive disorder, recurrent episode, moderate (H) 11/15/2012     Obesity (BMI 30-39.9)      OP (osteoporosis) T score -3.8 2009 T-score -3.7      LION (obstructive sleep apnoea) 10/15/2012    reported intolerant to CPAP -- she says she doesn't have LION     Pain in joint, forearm -- L unhealed Fx 2013     Premature menopause age 37 07/10/2012    OCP (vaginal bldg)-->HT which she stopped 2 mo later documented at 2007 visit (age 51).      Restless leg syndrome      Stiffness of joint, not elsewhere classified, hand 2013     Tremor 10/15/2011    head     Type 2 DM with Neuropathy     started with gestational diabetes     Uncomplicated asthma        Past Surgical History   I have reviewed this patient's surgical history and updated it with pertinent information if needed.  Past Surgical History:   Procedure Laterality Date     ABDOMEN SURGERY       ANKLE SURGERY       C/SECTION, LOW TRANSVERSE      x 2     CHOLECYSTECTOMY       COLONOSCOPY       COLONOSCOPY N/A 2022    Procedure: COLONOSCOPY, WITH POLYPECTOMY;  Surgeon: Leventhal, Thomas Michael, MD;  Location: [facility] OR     ESOPHAGOSCOPY, GASTROSCOPY, DUODENOSCOPY (EGD), COMBINED N/A 2015    Procedure: COMBINED ESOPHAGOSCOPY, GASTROSCOPY, DUODENOSCOPY (EGD);  Surgeon: Sky Davey MD;  Location:  GI     ESOPHAGOSCOPY, GASTROSCOPY, DUODENOSCOPY (EGD), COMBINED N/A 2015    Procedure: COMBINED ESOPHAGOSCOPY, GASTROSCOPY, DUODENOSCOPY (EGD), BIOPSY SINGLE OR MULTIPLE;   Surgeon: Sky Davey MD;  Location:  GI     EYE SURGERY       LAPAROSCOPY, SURGICAL; REPAIR INCISIONAL OR VENTRAL HERNIA       LASER SURGERY OF EYE Left 10/01/2020    sever vitreous strands     ORTHOPEDIC SURGERY       HERMINIA EN Y BOWEL  1990     WRIST SURGERY       [facility] TRANSPLANTATION OF KIDNEY  03/2014       Family History   I have reviewed this patient's family history and updated it with pertinent information if needed.   Family History   Problem Relation Age of Onset     Hyperlipidemia Mother      Diabetes Mother      Hypertension Mother      Genetic Disorder Father      Mental Illness Father      Diabetes Father      Hypertension Father      Mental Illness Other         family hx     Heart Disease Other      Diabetes Other      Cancer Other      Mental Illness Other      Diabetes Other      Glaucoma No family hx of      Macular Degeneration No family hx of        Social History   I have reviewed this patient's social history and updated it with pertinent information if needed. Elizabeth Palma  reports that she has never smoked. She has never used smokeless tobacco. She reports that she does not drink alcohol and does not use drugs.    Allergies   Allergies   Allergen Reactions     Percocet [Oxycodone-Acetaminophen] Nausea and Vomiting     Novocain [Procaine Hcl] Hives     Had reaction 25 years ago to old renetta. Pt reports multiple injections of lidocaine since then without reaction.  Tolerated lidocaine injection today without difficulty.  Osmar Mark MD IR Service.     Prior to Admission Medications     acetylcysteine  600 mg Oral BID     apixaban ANTICOAGULANT  5 mg Oral BID     ARIPiprazole  3 mg Oral BID     [START ON 7/8/2022] botulinum toxin type A  100 Units Intramuscular See Admin Instructions     cefTRIAXone  2 g Intravenous Q24H     cycloSPORINE modified  125 mg Oral BID     ferrous sulfate  325 mg Oral Daily with breakfast     fluticasone  1 puff Inhalation Daily     [Held by provider]  "furosemide  20 mg Oral Daily     gabapentin  600 mg Oral At Bedtime     lactobacillus rhamnosus (GG)  1 capsule Oral Daily     latanoprost  1 drop Both Eyes At Bedtime     metFORMIN  1,500 mg Oral Daily with supper     mycophenolic acid  540 mg Oral BID     pantoprazole  40 mg Oral Daily     prazosin  17 mg Oral At Bedtime     propranolol  40 mg Oral BID     psyllium  5 capsule Oral QPM     simvastatin  20 mg Oral At Bedtime     sodium bicarbonate  650 mg Oral BID     sodium chloride (PF)  3 mL Intracatheter Q8H     sulfamethoxazole-trimethoprim  1 tablet Oral Daily     topiramate  200 mg Oral BID     vilazodone  50 mg Oral Daily       - MEDICATION INSTRUCTIONS -         Physical Exam   Temp  Av.7  F (36.5  C)  Min: 97  F (36.1  C)  Max: 98.6  F (37  C)      Pulse  Av.7  Min: 48  Max: 57 Resp  Av.2  Min: 12  Max: 18  SpO2  Av.8 %  Min: 94 %  Max: 100 %     /63 (BP Location: Left arm)   Pulse 52   Temp 98.3  F (36.8  C) (Oral)   Resp 16   Ht 1.549 m (5' 0.98\")   Wt 85.8 kg (189 lb 2.5 oz)   LMP  (LMP Unknown)   SpO2 95%   BMI 35.76 kg/m      Admit Weight: 85.7 kg (188 lb 15 oz)     GENERAL APPEARANCE: alert and no distress  HENT: mouth without ulcers or lesions  LYMPHATICS: no cervical or supraclavicular nodes  RESP: lungs clear to auscultation - no rales, rhonchi or wheezes  CV: regular rhythm, normal rate, no rub, no murmur  EDEMA: no LE edema bilaterally  ABDOMEN: soft, nondistended, nontender, bowel sounds normal  MS: extremities normal - no gross deformities noted, no evidence of inflammation in joints, no muscle tenderness  SKIN: no rash  NEURO: normal strength and tone, sensory exam grossly normal, mentation intact and speech normal  PSYCH: mentation appears abnormal and she was slow to answer the year  TX KIDNEY: normal    Data   CMP  Recent Labs   Lab 22  0637 22  0717 22  1852 22  0954    140 136 142   POTASSIUM 4.2 4.1 4.4 5.1   CHLORIDE 109* " 112* 106 110*   CO2 20* 17* 20* 22   ANIONGAP 10 11 10 10   * 134* 122* 158*   BUN 13.6 14.5 16.2 16   CR 1.36* 1.33* 1.45* 1.45*   GFRESTIMATED 43* 44* 40* 40*   MARY 8.9 8.7* 9.5 9.1   PROTTOTAL  --   --  5.4*  --    ALBUMIN  --   --  3.9  --    BILITOTAL  --   --  0.4  --    ALKPHOS  --   --  45  --    AST  --   --  18  --    ALT  --   --  18  --      CBC  Recent Labs   Lab 06/29/22  0937 06/28/22  0717 06/27/22  0954   HGB 11.0* 10.9* 11.6*   WBC 3.4* 2.8* 4.6   RBC 3.69* 3.61* 3.84   HCT 35.9 35.3 36.6   MCV 97 98 95   MCH 29.8 30.2 30.2   MCHC 30.6* 30.9* 31.7   RDW 14.7 14.7 14.4   * 131* 153     INRNo lab results found in last 7 days.  ABGNo lab results found in last 7 days.   Urine Studies  Recent Labs   Lab Test 06/27/22  1008 03/25/22  1715 06/29/21  1043 06/18/21  0958 10/29/20  1346 10/18/18  1045 10/18/18  0000   COLOR Yellow Light Yellow Yellow Yellow Yellow   < > Yellow   APPEARANCE Clear Clear Cloudy Slightly Cloudy Clear   < > Clear   URINEGLC Negative Negative Negative Negative 100*   < > Neg   URINEBILI Negative Negative Negative Negative Negative   < > Neg   URINEKETONE Negative Negative Negative 5* Negative   < > Other   SG 1.015 1.010 1.020 1.027 1.015   < > 1.020   UBLD Negative Negative Negative Negative Negative   < > Small   URINEPH 6.0 5.5 7.0 6.0 5.0   < > 7.0   PROTEIN Negative Negative Negative 20* Negative   < > Neg   UROBILINOGEN  --   --  0.2  --  0.2  --  0.2   NITRITE Negative Negative Negative Negative Negative   < > Pos   LEUKEST Moderate* Small* Trace* Large* Trace*   < > Mod   RBCU <1 1 O - 2 3* O - 2   < >  --    WBCU 14* 10* 0 - 5 >182* 0 - 5   < >  --     < > = values in this interval not displayed.     Recent Labs   Lab Test 06/16/22  0940 10/14/21  1109 05/13/21  0935 10/29/20  1346 02/03/20  1331 07/29/19  0847 01/22/19  0916 08/28/18  0947 03/12/18  1229 03/24/17  1025 09/23/16  0908 02/08/16  1112 07/07/15  1220 03/10/15  0921 09/16/14  0907   UTPG 0.27*  0.16 0.15 0.17 0.17 0.13 0.16 0.19 0.18 0.13 0.21* 0.11 0.24* 0.13 0.10     PTH  Recent Labs   Lab Test 05/27/21  1107 05/13/21  0923 10/30/18  0903 05/19/17  0928 04/19/16  0846 02/08/16  0919 06/02/15  0847 05/05/15  0854 02/25/15  1539 11/11/14  0849   PTHI 38 88* 85* 76* 107* 98* 113* 127* 133* 173*     Iron Studies  Recent Labs   Lab Test 02/24/20  1236 12/17/19  1507 06/02/15  0847 09/16/14  0853 05/19/14  0849   IRON 72 29*  --  57 30*    440*  --  408 312   IRONSAT 27 6*  --  14* 10*   KERI 128 4* 10 22 47       IMAGING:  All imaging studies reviewed by me.

## 2022-06-29 NOTE — PROGRESS NOTES
06/29/22 1459   Quick Adds   Type of Visit Initial Occupational Therapy Evaluation       Present no   Living Environment   People in Home spouse   Current Living Arrangements condominium   Home Accessibility no concerns;other (see comments)  (elevator access)   Transportation Anticipated car, drives self;family or friend will provide   Living Environment Comments Pt reports living in condo with , elevator access, walk-in shower with grab bars and shower chair, raised toilet seat no grab bars.   Self-Care   Usual Activity Tolerance moderate   Current Activity Tolerance moderate   Equipment Currently Used at Home grab bar, tub/shower;raised toilet seat;shower chair;other (see comments)  (4WW)   Fall history within last six months yes   Number of times patient has fallen within last six months 2   Activity/Exercise/Self-Care Comment Pt reports ADL IND at baseline, typically utilizes walker for community mobility, will use 's arm assist in the home. Pt prefers to bathe in tub vs utilizing walk-in shower.   Instrumental Activities of Daily Living (IADL)   Previous Responsibilities driving   IADL Comments Per pt report,  responsible for IADL tasks including med mgmt, cooking, meal prep, receives grocery delivery, pt drives self.   General Information   Onset of Illness/Injury or Date of Surgery 06/27/22   Referring Physician Stevan Poole APRN CNP   Patient/Family Therapy Goal Statement (OT) Return home   Additional Occupational Profile Info/Pertinent History of Current Problem Per EMR, Elizabeth Palma is a 66 year old woman admitted on 6/27/2022. She has a history of polycystic kidney disease s/p kidney transplant in 2014, T2DM, HTN, pulmonary embolus on anticoagulation admitted with worsening confusion, fatigue and concern for possible UTI.   Existing Precautions/Restrictions fall   Limitations/Impairments safety/cognitive   Left Upper Extremity (Weight-bearing  Status) full weight-bearing (FWB)   Right Upper Extremity (Weight-bearing Status) full weight-bearing (FWB)   Left Lower Extremity (Weight-bearing Status) full weight-bearing (FWB)   Right Lower Extremity (Weight-bearing Status) full weight-bearing (FWB)   General Observations and Info Activity: Up With Assist   Cognitive Status Examination   Orientation Status orientation to person, place and time   Memory Deficit moderate deficit;short-term memory;immediate recall   Cognitive Status Comments Pt presents Ox4 however with increased deficits in short-term memory/recall;   Cognitive Screens/Assessments   Cognitive Assessments Completed [facility] Mental Status Exam ([facility]):  Total Score out of /30 12/30   [facility] Norms 1-20 equals dementia   [facility] Domains assessed: orientation, memory, attention, executive functions   SLUMS Interpretation Pt total score on [facility] Cognitive Assessment this date 12/30 with increased difficulty in the following: simple math calculation, recall animals with time constraint, recall common objects with interference, sequence numbers in reverse order, clock drawing, and executive functioning with problem-solving.   Visual Perception   Visual Impairment/Limitations corrective lenses full-time;other (see comments)  (prior cataract surgery)   Sensory   Sensory Quick Adds No deficits were identified   Pain Assessment   Patient Currently in Pain No   Integumentary/Edema   Integumentary/Edema no deficits were identifed   Posture   Posture not impaired   Posture Comments while seated at EOB   Range of Motion Comprehensive   General Range of Motion no range of motion deficits identified;bilateral upper extremity ROM WFL   Strength Comprehensive (MMT)   General Manual Muscle Testing (MMT) Assessment upper extremity strength deficits identified   Upper Extremity (Manual Muscle Testing)   Comment, MMT: Upper Extremity B UE MMT ~4/5; grossly deconditioned   Muscle Tone Assessment   Muscle  Tone Quick Adds No deficits were identified   Coordination   Upper Extremity Coordination No deficits were identified   Bed Mobility   Bed Mobility supine-sit   Assistive Device (Bed Mobility) bed rails   Comment (Bed Mobility) SBA supine>sit, HOB elevated   Transfers   Transfers sit-stand transfer   Sit-Stand Transfer   Sit-Stand [place] (Transfers) contact guard;set up   Assistive Device (Sit-Stand Transfers) walker, front-wheeled   Sit/Stand Transfer Comments CGA STS + FWW from EOB   Balance   Balance Assessment sitting balance: static   Balance Comments SBA static sitting balance   Activities of Daily Living   BADL Assessment/Intervention bathing;upper body dressing;lower body dressing;grooming;toileting   Bathing Assessment/Intervention   Position (Bathing) supported sitting   [place] Level (Bathing) minimum assist (75% patient effort);verbal cues;set up   Comment, (Bathing) Per clinicial judgement, min A for FB shower task while seated with set-up of supplies   Assistive Devices (Bathing) grab bar, tub/shower;shower chair   Upper Body Dressing Assessment/Training   Position (Upper Body Dressing) edge of bed sitting;unsupported sitting   Comment, (Upper Body Dressing) per clinical judgement, SBA and set-up to [facility] hospital gown seated at EOB   [place] Level (Upper Body Dressing) verbal cues;set up   Lower Body Dressing Assessment/Training   Position (Lower Body Dressing) supported sitting   Comment, (Lower Body Dressing) SBA to doff socks seated in chair using figure-4 technique   Grooming Assessment/Training   Position (Grooming) supported standing   [place] Level (Grooming) set up   Comment, (Grooming) SBA and set-up to wash hands standing at sink side   Toileting   Position (Toileting) supported standing   Assistive Devices (Toileting) grab bar, toilet   Comment, (Toileting) CGA and set-up to complete vivian cares in static standing with UE support on grab bar   [place]is Level  (Toileting) contact guard assist   Clinical Impression   Criteria for Skilled Therapeutic Interventions Met (OT) Yes, treatment indicated   OT Diagnosis Decreased ADL I/safety, cognition, functional dynamic standing balance   OT Problem List-Impairments impacting ADL problems related to;activity tolerance impaired;balance;cognition;mobility;strength;pain   Assessment of Occupational Performance 5 or more Performance Deficits   Identified Performance Deficits FB bathing, toilet transfer, functional mobility, cognition, IADL tasks including home mgmt, driving   Planned Therapy Interventions (OT) ADL retraining;IADL retraining;bed mobility training;cognition;ROM;strengthening;transfer training;home program guidelines;progressive activity/exercise   Clinical Decision Making Complexity (OT) low complexity   Anticipated Equipment Needs Upon Discharge (OT) other (see comments)  (TBD)   Risk & Benefits of therapy have been explained evaluation/treatment results reviewed;care plan/treatment goals reviewed;participants included;patient;spouse/significant other   Clinical Impression Comments Pt would below baseline function for ADL tasks and cognition; Would benefit from continued skilled OT during IP stay to progress ADL I/safety, functional mobility, cognition.   OT Discharge Planning   OT Discharge Recommendation (DC Rec) Transitional Care Facility;home with assist;home with home care occupational therapy   OT Rationale for DC Rec Pt below baseline function for ADL tasks, functional mobility, activity tolerance, cognition. Recommend continued skilled therapy at TCU to progress ADL/IADL I/safety. If pt were to discharge home, recommend 24/7 assist with all higher level IADL tasks including driving, med mgmt, cooking, and functional mobility, and home OT/PT.   OT Brief overview of current status CGA STS transfers/in-room mobility + FWW; SLUMS score: 12/30   Total Evaluation Time (Minutes)   Total Evaluation Time (Minutes) 7    OT Goals   Therapy Frequency (OT) 5 times/wk   OT Predicted Duration/Target Date for Goal Attainment 07/06/22   OT Goals Upper Body Bathing;Lower Body Bathing;Toilet Transfer/Toileting;Cognition;OT Goal 1   OT: Upper Body Bathing Modified independent;using adaptive equipment   OT: Lower Body Bathing Modified independent;using adaptive equipment   OT: Toilet Transfer/Toileting Modified independent;cleaning and garment management;toilet transfer   OT: Cognitive Patient/caregiver will verbalize understanding of cognitive assessment results/recommendations as needed for safe discharge planning   OT: Goal 1 Pt will be able to demonstrate bath tub transfer and STS from tub surface with mod IND to replicate home environment.

## 2022-06-29 NOTE — CONSULTS
Care Management Initial Consult    General Information  Assessment completed with: Patient  Type of CM/SW Visit: Initial Assessment  Primary Care Provider verified and updated as needed: Yes   Readmission within the last 30 days: no previous admission in last 30 days   Advance Care Planning: No ACP documents on file, pt declines at this time.      Communication Assessment  Patient's communication style: spoken language (English or Bilingual)    Hearing Difficulty or Deaf: no   Wear Glasses or Blind: yes    Cognitive  Cognitive/Neuro/Behavioral: WDL                      Living Environment:   People in home: spouse     Current living Arrangements: apartment      Able to return to prior arrangements: yes    Family/Social Support:  Care provided by: self, spouse/significant other  Provides care for: no one  Marital Status:   Support System: , Rahat       Description of Support System: Supportive, Involved      Current Resources:   Patient receiving home care services: No  Community Resources: None  Equipment currently used at home: grab bar, tub/shower, raised toilet seat (4WW)  Supplies currently used at home: None     Functional Status:  Prior to admission patient needed assistance: Independent with most ADLs, notes that her spouse assists when needed.      Mental Health Status:  Mental Health Status: No Current Concerns    Mental Health Management: Medication    Chemical Dependency Status:  Chemical Dependency Status: No Current Concerns              Additional Information:  Care management assessment completed via phone call. Patient is declining TCU placement and prefers to discharge to home w/assist from her  and home care services. Writer discussed home care agencies, patient voiced no preference and could not recall the agency she previously used. Home care referrals initiated as noted below. Patient notes that her spouse will provide transportation at discharge, likely today. IMM completed  via phone, patient agreeable to discharge. Care coordination will continue to follow for discharge planning.     1257 Addendum:  HC confirmed, orders placed at this time. Primary team updated.     Pending HC referrals:  -[facility]: Referral sent.     Confirmed Home Care:  [facility] (RN/PT/OT)  Phone: [phone]  Fax: [phone]    Shazia Posada RNCC, BSN    [facility]    [address]  [address] [place] [id number]    [email or web address]    Office: [phone] Pager: [phone]    To contact the weekend RNCC  [place] (0800 - 1630) Saturday and Sunday    Units: 4A, 4C, 4E, 5A and 5B- Pager 1: [phone]    Units: 6A, 6B, 6C, 6D- Pager 2: [phone]    Units: 7A, 7B, 7C, 7D, and 5C-Pager 3: [phone]

## 2022-06-29 NOTE — DISCHARGE SUMMARY
[facility]  Hospitalist Discharge Summary      Date of Admission:  6/27/2022  Date of Discharge:  6/29/2022  Discharging Provider: Mary Jo Olmedo MD  Discharge Service: Hospitalist Service, GOLD TEAM 10    Discharge Diagnoses   # Acute toxic metabolic encephalopathy  # Suspected UTI   # History of renal transplant   # Peripheral edema     Follow-ups Needed After Discharge   Follow-up Appointments     Adult [facility] Follow-up and recommended labs and tests      Follow up with primary care provider, Horacio Sheridan, within 7 days for   hospital follow- up.  No follow up labs or test are needed.      We will set you up with home nursing, and home PT and OT services to help   you manage at home.    The transplant nephrology team will also contact you to schedule a   follow-up appointment (Dr. Jordan).    Appointments on [place] and/or [facility] (with [facility] or [facility]   provider or service). Call [phone] if you haven't heard regarding   these appointments within 7 days of discharge.             Unresulted Labs Ordered in the Past 30 Days of this Admission     Date and Time Order Name Status Description    6/29/2022  1:00 AM EBV DNA PCR Quantitative Whole Blood In process     6/29/2022  1:00 AM CMV Quantitative, PCR In process       These results will be followed up by Dr. Jordan, Transplant Nephrology    Discharge Disposition   Discharged to home  Condition at discharge: Stable    Hospital Course   Elizabeth Palma is a 66 year old woman admitted on 6/27/2022. She has a history of polycystic kidney disease s/p kidney transplant in 2014, T2DM, HTN, pulmonary embolus on anticoagulation admitted with worsening confusion and fatigue. Suspected etiology of symptoms is multifactorial with excessive diuresis from Lasix, medication toxicity from Gabapentin, and possible UTI all contributing. Patient was recommended to discharge to TCU but elected to return home with home  PT and OT services on discharge. Transplant Nephrology helped manage patient during hospitalization.    # Acute toxic metabolic encephalopathy  # Falls  Pt reports some degree of confusion after having COVID two months ago but with acutely worsened confusion in the days leading up to admission with fall PTA.  Work up in ED notable for: normal electrolytes, normal WBC, UA showing moderate leuk esterase, COVID negative, CT C spine and CT head notable C5-6 spondylosis but no acute fractures or bleed. Mental status improved by day after admission. but patient continues to endorse significant fatigue.  Overall suspicion is that the etiology of altered mentation was multifactorial with excessive diuresis from Lasix, med toxicity from Gabapentin, and possible UTI (pt endorsed urinary frequency) all contributing. Pt improved with medication adjustments and antibiotic therapy. Was seen by PT and OT and TCU was recommended; however, patient declined and wanted to return home.  agreeable to discharge plan.    # History of renal transplant   # Metabolic acidosis 2/2 CKD  Transplanted in 2014 due to polycystic kidney disease.  Cr quite labile per chart review. Patient was seen by Transplant Nephrology who helped manage IS medications - no changes were made to these.    However, given worsening metabolic acidosis, initiation of sodium bicarbonate was recommended.  Also recommended to hold further lasix upon discharge per Transplant Nephro.    # Peripheral edema   During admission, TTE showed normal LVEF with normal IVC. Also doppler US of the lower extremities were negative for DVT. Recommended to hold Lasix upon discharge.     # History of glaucoma  - Continue home latanoprost    # History of asthma  - Continue home levalbuterol    # Chronic back pain  - Continue gabapentin at 600 mg at bedtime    # History of depression  - Continue home vilazodone    # History of PE  - Continue home apixaban    # DM II  - Continue home  metformin    Consultations This Hospital Stay   NEPHROLOGY KIDNEY/PANCREAS TRANSPLANT ADULT IP CONSULT  PHYSICAL THERAPY ADULT IP CONSULT  OCCUPATIONAL THERAPY ADULT IP CONSULT  CARE MANAGEMENT / SOCIAL WORK IP CONSULT  CARE MANAGEMENT / SOCIAL WORK IP CONSULT    Code Status   Full Code    Time Spent on this Encounter   IMary Jo MD, personally saw the patient today and spent greater than 30 minutes discharging this patient.       Mary Jo Olmedo MD  Former[facility] [address] [address]  Phone: [phone]  ______________________________________________________________________    Physical Exam   Vital Signs: Temp: 97.4  F (36.3  C) Temp src: Oral BP: 99/53 Pulse: 52   Resp: 18 SpO2: 96 % O2 Device: None (Room air)    Weight: 189 lbs 2.47 oz  General Appearance: Elderly female, sitting up in bed, no acute distress, pleasant, head tremor noted  Respiratory: Breathing comfortably on room air, lungs CTAB  Cardiovascular: Sinus bradycardia, no m/r/g.  GI: soft, non-distended, non-tender to palpation  Skin: no skin rash, pallor present  Other: Trace LE edema bilaterally        Primary Care Physician   Horacio FABIAN Sick    Discharge Orders      Home Care Referral      Reason for your hospital stay    You were admitted to the hospital with increased confusion. We think this is likely related to several different things - including a urinary tract infection, excessive water removal with Lasix, and possibly the increased Gabapentin dose. Our therapists saw you and recommended you go to rehab to gain strength but you elected to go home instead. We would recommend continuing antibiotics to complete 7 days of treatment for a urinary tract infection. We will also hold further Lasix upon discharge. Would also suggest reducing the dose of gabapentin to 600 mg nightly to avoid any side effects from that medication.     Activity    Your activity upon discharge: activity as  tolerated     Adult [facility] Follow-up and recommended labs and tests    Follow up with primary care provider, Horacio Sheridan, within 7 days for hospital follow- up.  No follow up labs or test are needed.      We will set you up with home nursing, and home PT and OT services to help you manage at home.    The transplant nephrology team will also contact you to schedule a follow-up appointment (Dr. Jordan).    Appointments on [place] and/or [facility] (with [facility] or [facility] provider or service). Call [phone] if you haven't heard regarding these appointments within 7 days of discharge.     Diet    Follow this diet upon discharge: Orders Placed This Encounter      Combination Diet Regular Diet Adult       Significant Results and Procedures   Results for orders placed or performed during the hospital encounter of 06/27/22   CT Head w/o Contrast    Narrative    CT HEAD W/O CONTRAST 6/27/2022 7:10 PM    History: fall, confusion     Comparison: Head CT on 5/20/2021    Technique: Using multidetector thin collimation helical acquisition  technique, axial, coronal and sagittal CT images from the skull base  to the vertex were obtained without intravenous contrast.   (topogram) image(s) also obtained and reviewed.    Findings: Findings are somewhat limited secondary to motion artifact.  There is no intracranial hemorrhage, mass effect, or midline shift.  Gray/white matter differentiation in both cerebral hemispheres is  preserved. No suspicious interval change in predominantly frontal  volume loss. Ventricles are proportionate to the cerebral sulci. The  basal cisterns are clear. Vascular calcifications of the carotid  siphons.    Old inferior right orbital floor fracture, unchanged. Complete  opacification of the right maxillary sinus. Mucosal thickening in the  sphenoid and left maxillary sinus. The mastoid air cells are clear.      Impression    Impression:  1. No acute intracranial pathology.   2. Unchanged  predominantly frontal volume loss.    I have personally reviewed the examination and initial interpretation  and I agree with the findings.    FAHEEM ENAMORADO MD         SYSTEM ID:  [id number]   CT Cervical Spine w/o Contrast    Narrative    CT CERVICAL SPINE W/O CONTRAST 6/27/2022 7:10 PM    Provided History: fall    Comparison: MRI 4/22/2019, CT 6/21/2011    Technique: Using multidetector thin collimation helical acquisition  technique, axial, coronal and sagittal CT images through the cervical  spine were obtained without intravenous contrast.     Findings:  The cervical vertebrae are normally aligned. Straightening of the  normal cervical lordosis. Congenital incomplete fusion of the spinous  process of C2. No acute fracture or subluxation. No prevertebral  edema. There is advanced disc height narrowing at C5-C6. The findings  on a level by level basis are as follows:    C2-3:  Right greater than left facet hypertrophy. No significant  spinal canal or neural foraminal stenosis.    C3-4:  Right greater than left facet hypertrophy. Mild right neural  foraminal narrowing. No left neural foraminal stenosis. No spinal  canal stenosis.    C4-5:  No significant spinal canal or neural foraminal stenosis.    C5-6:  Bilateral facet arthropathy and uncinate spurring. Mild  bilateral neural foraminal stenosis. Mild spinal canal narrowing.    C6-7:  Circumferential disc osteophyte complex. No spinal canal or  neural foraminal stenosis.    C7-T1:  No significant spinal canal or neural foraminal stenosis.     No abnormality of the paraspinous soft tissues.      Impression    Impression:   1. No acute fracture or traumatic subluxation of the cervical spine.  2. Multilevel cervical spondylosis most pronounced at C5-C6.    I have personally reviewed the examination and initial interpretation  and I agree with the findings.    FAHEEM ENAMORADO MD         SYSTEM ID:  [id number]   XR Lumbar Spine 2/3 Views    Narrative    EXAM: XR LUMBAR  SPINE 2-3 VIEWS  LOCATION: [facility]  DATE/TIME: 6/27/2022 7:03 PM    INDICATION: fall, back pain  COMPARISON: None.  TECHNIQUE: CR Lumbar Spine.      Impression    IMPRESSION: 5 lumbar type vertebral bodies. No fracture. Normal vertebral heights and alignment. Moderately severe degenerative interbody change at L5-S1 with loss of disc space height and endplate sclerosis. Otherwise unremarkable disc spaces and facets   for age. Normal extraspinal structures.   US Renal Transplant with Doppler    Narrative    EXAMINATION: US RENAL TRANSPLANT,  6/27/2022 7:48 PM     COMPARISON: Ultrasound 6/21/2022.    HISTORY: back pain, confusion, tanisha    TECHNIQUE:  Grey-scale, color Doppler and spectral flow analysis.    FINDINGS:  The transplant kidney is located in the right lower quadrant, and  measures 10.9 cm. Parenchyma is of normal thickness and echogenicity.  No focal lesions. No hydronephrosis. No perinephric fluid collection.    Renal artery flow:   49 cm/sec peak systolic at hilum.  124 cm/s peak systolic at anastomosis. Resistive index of 0.89.  Arcuate artery resistive indices (upper to lower): 0.74, 0.80, 0.79    Renal Vein Flow:  17 cm/s at hilum.   34 cm/s at anastomosis.    Iliac artery flow:  147 peak systolic above anastomosis.  191 peak systolic below anastomosis.    Iliac vein flow:  Patent above and below the anastomosis.    The urinary bladder is moderately distended and otherwise  unremarkable.      Impression    IMPRESSION:   1. Normal grayscale appearance of the right lower quadrant transplant  kidney. No hydronephrosis.  2. Patent Doppler evaluation of the transplant kidney.  3. Elevated resistive indices throughout the arcuate arteries and  renal artery anastomosis, increased compared to 6/21/2022.  Differential would include medical renal disease and rejection.    I have personally reviewed the examination and initial interpretation  and I agree with the  findings.    MEDARDO COFFMAN DO         SYSTEM ID:  [id number]    Lower Extremity Venous Duplex Bilateral    Narrative    EXAMINATION: DOPPLER VENOUS ULTRASOUND OF BILATERAL LOWER EXTREMITIES,  2022 5:27 PM     COMPARISON: Ultrasound 2020    HISTORY: bilateral lower extremity edema, R>L    TECHNIQUE:  Gray-scale evaluation with compression, spectral flow and  color Doppler assessment of the deep venous system of both legs from  groin to knee, and then at the ankles.    FINDINGS:  In both lower extremities, the common femoral, femoral, popliteal and  posterior tibial veins demonstrate normal compressibility and blood  flow.      Impression    IMPRESSION:  1.  No evidence of deep venous thrombosis in either lower extremity.    I have personally reviewed the examination and initial interpretation  and I agree with the findings.    SCAR FREY MD         SYSTEM ID:  [id number]   Echo Complete     Value    LVEF  60-65%    Narrative    [id number]  [id number]  [id number]  [id number]^PIPER^DILEEP^DONNY     [facility]  Echocardiography Laboratory  [address] [place] [id number]     Name: ANITA ULRICH  MRN: [id number]  : 1955  Study Date: 2022 03:29 PM  Age: 66 yrs  Gender: Female  Patient Location: [facility]  Reason For Study: CHF  Ordering Physician: DILEEP SALDAÑA  Performed By: Rekha Prather RDCS     BSA: 1.8 m2  Height: 61 in  Weight: 189 lb  BP: 107/65 mmHg  ______________________________________________________________________________  Procedure  Echocardiogram with two-dimensional, color and spectral Doppler performed.  ______________________________________________________________________________  Interpretation Summary  Global and regional left ventricular function is normal with an EF of 60-65%.  Global right ventricular function is normal.  No significant valvular abnormalities present.  The inferior vena cava was normal in size with  preserved respiratory  variability.  No pericardial effusion is present.  ______________________________________________________________________________  Left Ventricle  Left ventricular size is normal. Left ventricular wall thickness is normal.  Global and regional left ventricular function is normal with an EF of 60-65%.  No regional wall motion abnormalities are seen.     Right Ventricle  The right ventricle is normal size. Global right ventricular function is  normal.     Atria  The right atria appears normal. Mild left atrial enlargement is present.     Mitral Valve  Mild mitral annular calcification is present.     Aortic Valve  Aortic valve is normal in structure and function.     Tricuspid Valve  The tricuspid valve is normal. The peak velocity of the tricuspid regurgitant  jet is not obtainable. Pulmonary artery systolic pressure cannot be assessed.     Pulmonic Valve  The pulmonic valve is normal.     Vessels  The aorta root is normal. The inferior vena cava was normal in size with  preserved respiratory variability.     Pericardium  No pericardial effusion is present.     Compared to Previous Study  This study was compared with the study from 9.10.20 . No significant changes  noted.  ______________________________________________________________________________  MMode/2D Measurements & Calculations  IVSd: 1.1 cm  LVIDd: 3.8 cm  LVIDs: 2.4 cm  LVPWd: 0.98 cm  FS: 36.9 %  LV mass(C)d: 123.0 grams  LV mass(C)dI: 66.7 grams/m2  Ao root diam: 3.1 cm  asc Aorta Diam: 2.9 cm  LVOT diam: 2.0 cm  LVOT area: 3.1 cm2  LA Volume (BP): 66.8 ml  LA Volume Index (BP): 36.3 ml/m2     RWT: 0.52     Doppler Measurements & Calculations  MV E max lenka: 73.3 cm/sec  MV A max lenka: 54.4 cm/sec  MV E/A: 1.3  MV dec slope: 341.0 cm/sec2  MV dec time: 0.21 sec  E/E' av.4  Lateral E/e': 7.5  Medial E/e': 11.2     ______________________________________________________________________________  Report approved by: Luisa Fleming  06/28/2022 04:09 PM           *Note: Due to a large number of results and/or encounters for the requested time period, some results have not been displayed. A complete set of results can be found in Results Review.       Discharge Medications   Current Discharge Medication List      START taking these medications    Details   cefpodoxime (VANTIN) 200 MG tablet Take 1 tablet (200 mg) by mouth 2 times daily  Qty: 10 tablet, Refills: 0    Associated Diagnoses: Delirium      sodium bicarbonate 650 MG tablet Take 1 tablet (650 mg) by mouth 2 times daily  Qty: 60 tablet, Refills: 1    Associated Diagnoses: Elevated serum creatinine         CONTINUE these medications which have NOT CHANGED    Details   acetaminophen (TYLENOL) 500 MG tablet Take 1,500 mg by mouth every 6 hours as needed for mild pain      acetylcysteine (N-ACETYL CYSTEINE) 600 MG CAPS capsule Take 1 capsule (600 mg) by mouth 2 times daily (Patient does not know what strength they are taking.)  Qty: 60 capsule, Refills: 3    Associated Diagnoses: Excoriation (skin-picking) disorder      apixaban ANTICOAGULANT (ELIQUIS) 5 MG tablet Take 1 tablet (5 mg) by mouth 2 times daily      ARIPiprazole (ABILIFY) 2 MG tablet Take 1.5 tablets (3 mg) by mouth 2 times daily  Qty: 90 tablet, Refills: 2    Associated Diagnoses: Major depressive disorder, recurrent episode, moderate (H)      ascorbic acid (VITAMIN C) 250 MG CHEW chewable tablet Take 2 tablets (500 mg) by mouth daily      beclomethasone HFA (QVAR REDIHALER) 40 MCG/ACT inhaler Inhale 1 puff into the lungs 2 times daily      cycloSPORINE modified (GENERIC EQUIVALENT) 25 MG capsule Take 5 capsules (125 mg) by mouth 2 times daily TAKE 5 CAPSULES (125MG) BY MOUTH TWO TIMES A DAY  Qty: 300 capsule, Refills: 11    Associated Diagnoses: Kidney replaced by transplant      denosumab (PROLIA) 60 MG/ML SOSY injection Inject 60 mg Subcutaneous      ferrous sulfate (FEROSUL) 325 (65 Fe) MG tablet Take 1 tablet (325 mg) by  mouth daily (with breakfast)  Qty: 100 tablet, Refills: 0    Comments: Refills should be available for transfer from [facility] pharmacy in [place]  Associated Diagnoses: Iron deficiency      fluticasone (FLONASE) 50 MCG/ACT nasal spray Spray 1 spray into both nostrils daily  Qty: 48 g, Refills: 3    Associated Diagnoses: Seasonal allergic rhinitis      fluticasone (FLOVENT HFA) 220 MCG/ACT inhaler Inhale 1 puff into the lungs      gabapentin (NEURONTIN) 300 MG capsule Take 2 capsules (600 mg) by mouth At Bedtime  Qty: 180 capsule, Refills: 2    Comments: ZERO refills remain on this prescription. Your patient is requesting advance approval of refills for this medication to PREVENT ANY MISSED DOSES  Associated Diagnoses: Type 2 diabetes mellitus with diabetic polyneuropathy, without long-term current use of insulin (H)      Lactobacillus (ACIDOPHILUS) 100 MG CAPS Take 100 mg by mouth daily      latanoprost (XALATAN) 0.005 % ophthalmic solution Place 1 drop into both eyes At Bedtime  Qty: 7.5 mL, Refills: 4    Associated Diagnoses: Mild stage glaucoma      levalbuterol (XOPENEX HFA) 45 MCG/ACT inhaler Inhale 2 puffs into the lungs every 6 hours as needed for shortness of breath / dyspnea or wheezing  Qty: 15 g, Refills: 3    Comments: Side effects to albuterol  Associated Diagnoses: Moderate persistent asthma without complication      LORazepam (ATIVAN) 0.5 MG tablet Take 1 tab (0.5mg) twice weekly as need for anxiety  Qty: 30 tablet, Refills: 0    Associated Diagnoses: Major depressive disorder, recurrent episode, moderate (H)      metFORMIN (GLUCOPHAGE XR) 500 MG 24 hr tablet Take 3 tablets (1,500 mg) by mouth daily (with dinner)  Qty: 270 tablet, Refills: 1    Associated Diagnoses: Type 2 diabetes mellitus with diabetic polyneuropathy, without long-term current use of insulin (H)      !! mycophenolic acid (GENERIC EQUIVALENT) 180 MG EC tablet Take 2 tablets (360 mg) by mouth 2 times daily      !! mycophenolic  acid (GENERIC EQUIVALENT) 180 MG EC tablet Take 3 tablets (540 mg) by mouth 2 times daily for 90 days  Qty: 540 tablet, Refills: 3    Associated Diagnoses: Immunosuppression (H)      ondansetron (ZOFRAN-ODT) 4 MG ODT tab Take 1 tablet (4 mg) by mouth every 6 hours as needed for nausea  Qty: 20 tablet, Refills: 4    Associated Diagnoses: Chronic kidney disease, stage V (H)      order for DME Walker with front wheels and a seat.  Qty: 1 Units, Refills: 0    Associated Diagnoses: Fall, initial encounter      pantoprazole (PROTONIX) 40 MG EC tablet Take 1 tablet (40 mg) by mouth daily  Qty: 90 tablet, Refills: 3    Comments: This is instead of omeprazole.  Associated Diagnoses: Gastroesophageal reflux disease with esophagitis without hemorrhage      Polyvinyl Alcohol-Povidone (REFRESH OP) Apply to eye as needed Both eyes      prazosin (MINIPRESS) 5 MG capsule Take 3 x 5mg (15mg) caps + 1 x 2mg caps at bedtime (total dose=17mg)  Qty: 90 capsule, Refills: 3    Associated Diagnoses: Nightmares associated with chronic post-traumatic stress disorder      propranolol (INDERAL) 20 MG tablet Take 2 tablets (40 mg) by mouth 2 times daily Afternoon & evening.      psyllium (METAMUCIL/KONSYL) capsule Take 5 capsules by mouth every evening With supper.      simvastatin (ZOCOR) 20 MG tablet Take 1 tablet (20 mg) by mouth At Bedtime      sulfamethoxazole-trimethoprim (BACTRIM) 400-80 MG tablet Take 1 tablet by mouth daily  Qty: 90 tablet, Refills: 3    Associated Diagnoses: Immunosuppression (H); Kidney transplanted      topiramate (TOPAMAX) 200 MG tablet Take 1 tablet (200 mg) by mouth 2 times daily  Qty: 60 tablet, Refills: 11    Associated Diagnoses: Morbid obesity due to excess calories (H)      !! vilazodone (VIIBRYD) 10 MG TABS tablet Take 10mg tab with 40mg tab for total daily dose of 50 mg  Qty: 30 tablet, Refills: 3    Associated Diagnoses: Major depressive disorder, recurrent episode, moderate (H)      !! vilazodone  (VIIBRYD) 40 MG TABS tablet Take 40mg tab with 10mg tab for total daily dose of 50 mg  Qty: 30 tablet, Refills: 3    Associated Diagnoses: Major depressive disorder, recurrent episode, moderate (H)       !! - Potential duplicate medications found. Please discuss with provider.      STOP taking these medications       furosemide (LASIX) 20 MG tablet Comments:   Reason for Stopping:             Allergies   Allergies   Allergen Reactions     Percocet [Oxycodone-Acetaminophen] Nausea and Vomiting     Novocain [Procaine Hcl] Hives     Had reaction 25 years ago to old renetta. Pt reports multiple injections of lidocaine since then without reaction.  Tolerated lidocaine injection today without difficulty.  Osmar Mark MD IR Service.

## 2022-06-29 NOTE — PLAN OF CARE
Patient and spouse verbalized understanding of After Visit Summary. Peripheral IV removed, personal belongings packed, and patient transported to discharge pharmacy.

## 2022-06-29 NOTE — PLAN OF CARE
Activity: bed alarm, up to bathroom gait belt/walker  Neuros:alert and oriented x 4, tremors  Cardiac:denies chest pain, ECHO this shift, bradycardic  Respiratory:room air  GI/:positive bowel sounds, voiding  Diet:regular  Skin:generalized ecchymosis  Lines/Drains:PIV TKO left forearm

## 2022-06-29 NOTE — PROGRESS NOTES
Care Management Discharge Note    Discharge Date: 06/29/2022       Discharge Disposition: Home with services    Discharge Services: Home Care     Discharge DME: NA     Discharge Transportation: car, drives self, family or friend will provide    Private pay costs discussed: Not applicable    PAS Confirmation Code: NA   Patient/family educated on Medicare website which has current facility and service quality ratings: yes      Education Provided on the Discharge Plan: yes   Persons Notified of Discharge Plans: patient, spouse  Patient/Family in Agreement with the Plan: yes      Handoff Referral Completed: External    Additional Information:  Received a call from [facility] updating us that pt is open to [facility] so they will close the referral.  Called and spoke with Amarilys at [facility] and pt is open to RN/PT/OT services.  Orders updated. Discharge orders and summary faxed to [place].  RNCC will remain available if further needs arise.      [facility](RN/PT/OT)  Phone: [phone]  Fax: [phone]      JANNA Willams  Phone: [phone]  Pager: [phone]  To contact the weekend RNCC  [place] (0800 - 1630) Saturday and Sunday    Units: 4A, 4C, 4E, 5A and 5B- Pager 1: [phone]    Units: 6A, 6B, 6C, 6D- Pager 2: [phone]    Units: 7A, 7B, 7C, 7D, and 5C-Pager 3: [phone]

## 2022-06-29 NOTE — PLAN OF CARE
"7B: OT Session 2022    [facility]    The SLUMS ([facility] Mental Status Exam) is a 30-point standardized cognitive screen used to identify the presence of cognitive deficits and/or to identify a change in cognition over time.  This screen assesses cognitive abilities in various domains.  ([email or web address])    Patient's performance was as follows       Orientation and Attention: 3/3       Calculation and Registration: 0/3       Category Naming with Time Contraint: 1/3       Memory Delayed Recall with Interference: 2/5       Registration and Digit Span: 0/2       Clock drawin/4       Visual Spatial: 2/2       Story Recall with Executive Function: 4/8      Total Score:        Scoring If High School Educated If Less than High School Educated   Normal 27-30 25-30   Mild Neurocognitive Disorder 21-26 20-24   Dementia 1-20 1-19       Score Interpretation: Score places patient in the \"Dementia\" category.  Patient demonstrates deficits in the following areas: \"Calculation/Registration, Category Naming with Time Constraint, Memory Delayed Recall with Interference, Registration and Digit Span, Clock Drawing, Story Recall with Executive Function.\"  Please note that this examination is used to screen individuals to look for the presence of cognitive deficits and to identify changes in cognition over time.  This is not a diagnosis.  This examination can be followed by further cognitive assessments if appropriate and deemed necessary.                        "

## 2022-06-29 NOTE — PLAN OF CARE
Shift:   VS: Temp: 97.4  F (36.3  C) Temp src: Oral BP: 99/53 Pulse: 52   Resp: 18 SpO2: 96 % O2 Device: None (Room air)    Pain: Denies  Neuro: A&OX4, calls appropriately  Cardiac:   Denies chest pain/palpitations  Respiratory: RA, denies SOB  GI/Diet/Appetite: Good oral intake, no nausea/emesis  :  Good UO, no BM this shift  LDA's: PIV SL  Skin: No new deficit noted  Activity: Up with SBA  Tests/Procedures:   Pertinent Labs/Lab Collection:      Plan: Awaiting discharge order.  is at bedside and will provide ride to home. Will follow up with home nursing, PT/OT. Continue with  discharge process.

## 2022-06-29 NOTE — PLAN OF CARE
B/P: 91/45, T: 97.3, P: 57, R: 18 C/O back pain,requested oxycodone. Oxycodone given with some relief. AOx4. Bed alarm activated. Up to bathroom Assist of 1 with walker. Voiding adequate amounts. Appeared to sleep in between cares. Continue to monitor and notify MD with any changes.

## 2022-06-30 LAB — EBV DNA # SPEC NAA+PROBE: NOT DETECTED COPIES/ML

## 2022-06-30 NOTE — PLAN OF CARE
Physical Therapy Discharge Summary    Reason for therapy discharge:    Discharged to home with home therapy.    Progress towards therapy goal(s). See goals on Care Plan in [facility] electronic health record for goal details.  Goals partially met.  Barriers to achieving goals:   discharge from facility.    Therapy recommendation(s):    Continued therapy is recommended.  Rationale/Recommendations:  progress functional I.

## 2022-07-01 ENCOUNTER — TELEPHONE (OUTPATIENT)
Dept: INTERNAL MEDICINE | Facility: CLINIC | Age: 67
End: 2022-07-01

## 2022-07-01 ENCOUNTER — MYC MEDICAL ADVICE (OUTPATIENT)
Dept: NEPHROLOGY | Facility: CLINIC | Age: 67
End: 2022-07-01

## 2022-07-01 DIAGNOSIS — Z94.0 KIDNEY REPLACED BY TRANSPLANT: Primary | ICD-10-CM

## 2022-07-01 NOTE — TELEPHONE ENCOUNTER
[facility] Call Center    Phone Message    May a detailed message be left on voicemail: yes     Reason for Call: Order(s): Home Care Orders: Physical Therapy (PT): Continuing with PT stating yesterday 06/30/2022 after most recent hospitalization. 1 x week for 2 weeks for PT. Plan to recertify home care next week.      Lorelei #: [phone]    Action Taken: Message routed to:  [facility]    Travel Screening: Not Applicable

## 2022-07-01 NOTE — TELEPHONE ENCOUNTER
Health Call Center    Phone Message    May a detailed message be left on voicemail: yes     Reason for Call: Medication Question or concern regarding medication   Prescription Clarification  Name of Medication:cycloSPORINE modified (GENERIC EQUIVALENT) 25 MG capsule, simvastatin (ZOCOR) 20 MG tablet, cefpodoxime (VANTIN) 200 MG, pantoprazole (PROTONIX) 40 MG EC tablet    Prescribing Provider: Francisco Jordan MD, Mary Jo Olmedo MD, Horacio Sheridan MD   Pharmacy: [id number], [place] - [address] AT [facility] [address]   What on the order needs clarification?   Lorelei PT [facility] reporting possible drug interactions.    cycloSPORINE modified (GENERIC EQUIVALENT) 25 MG capsule interacting with simvastatin (ZOCOR) 20 MG tablet. cefpodoxime (VANTIN) 200 MG tablet interacting with pantoprazole (PROTONIX) 40 MG EC tablet. New meds from recent hospitalization.      Lorelei #: [phone]    Action Taken: Message routed to:  [facility]    Travel Screening: Not Applicable

## 2022-07-01 NOTE — TELEPHONE ENCOUNTER
Called Lorelei and gave verbal orders per Dr. Sheridan for Order(s): Home Care Orders: Physical Therapy (PT): Continuing with PT stating yesterday 06/30/2022 after most recent hospitalization. 1 x week for 2 weeks for PT. Plan to recertify home care next week.          Bertin Doherty CMA ([facility]) at 9:48 AM on 7/1/2022

## 2022-07-01 NOTE — TELEPHONE ENCOUNTER
Called Stefan and gave verbal orders per Dr. Sheridan for Order(s): Home Care Orders: Occupational Therapy (OT): 1 time a week for 2 weeks.      Bertin Doherty CMA ([facility]) at 10:19 AM on 7/1/2022

## 2022-07-01 NOTE — TELEPHONE ENCOUNTER
[facility] Call [facility]    Phone Message    May a detailed message be left on voicemail: yes     Reason for Call: Order(s): Home Care Orders: Occupational Therapy (OT): 1 time a week for 2 weeks.    Action Taken: Message routed to:  [facility]    Travel Screening: Not Applicable

## 2022-07-01 NOTE — TELEPHONE ENCOUNTER
RN called Lorelei with home care and relayed Gerber Cross's message about med interactions.    Elizabeth Garcia RN on 7/1/2022 at 11:18 AM

## 2022-07-05 ENCOUNTER — TELEPHONE (OUTPATIENT)
Dept: INTERNAL MEDICINE | Facility: CLINIC | Age: 67
End: 2022-07-05

## 2022-07-05 ENCOUNTER — TELEPHONE (OUTPATIENT)
Dept: TRANSPLANT | Facility: CLINIC | Age: 67
End: 2022-07-05

## 2022-07-05 ENCOUNTER — VIRTUAL VISIT (OUTPATIENT)
Dept: INTERNAL MEDICINE | Facility: CLINIC | Age: 67
End: 2022-07-05
Payer: MEDICARE

## 2022-07-05 DIAGNOSIS — E11.42 TYPE 2 DIABETES MELLITUS WITH DIABETIC POLYNEUROPATHY, WITHOUT LONG-TERM CURRENT USE OF INSULIN (H): Primary | ICD-10-CM

## 2022-07-05 DIAGNOSIS — R11.0 NAUSEA: ICD-10-CM

## 2022-07-05 DIAGNOSIS — N18.30 STAGE 3 CHRONIC KIDNEY DISEASE, UNSPECIFIED WHETHER STAGE 3A OR 3B CKD (H): ICD-10-CM

## 2022-07-05 DIAGNOSIS — Z94.0 HISTORY OF KIDNEY TRANSPLANT: ICD-10-CM

## 2022-07-05 DIAGNOSIS — Z94.0 KIDNEY REPLACED BY TRANSPLANT: ICD-10-CM

## 2022-07-05 DIAGNOSIS — E21.3 HYPERPARATHYROIDISM (H): ICD-10-CM

## 2022-07-05 PROBLEM — N18.31 CHRONIC KIDNEY DISEASE, STAGE 3A (H): Status: RESOLVED | Noted: 2022-07-05 | Resolved: 2022-07-05

## 2022-07-05 PROBLEM — N18.31 CHRONIC KIDNEY DISEASE, STAGE 3A (H): Status: ACTIVE | Noted: 2022-07-05

## 2022-07-05 PROCEDURE — 99215 OFFICE O/P EST HI 40 MIN: CPT | Mod: 95 | Performed by: NURSE PRACTITIONER

## 2022-07-05 RX ORDER — FUROSEMIDE 20 MG
20 TABLET ORAL DAILY
Qty: 30 TABLET | Refills: 11 | Status: SHIPPED | OUTPATIENT
Start: 2022-07-05

## 2022-07-05 RX ORDER — GABAPENTIN 300 MG/1
600 CAPSULE ORAL AT BEDTIME
Qty: 180 CAPSULE | Refills: 0 | Status: SHIPPED | OUTPATIENT
Start: 2022-07-05

## 2022-07-05 RX ORDER — ONDANSETRON 4 MG/1
4 TABLET, FILM COATED ORAL EVERY 8 HOURS PRN
Qty: 30 TABLET | Refills: 1 | Status: SHIPPED | OUTPATIENT
Start: 2022-07-05

## 2022-07-05 RX ORDER — GABAPENTIN 100 MG/1
500 CAPSULE ORAL AT BEDTIME
Qty: 150 CAPSULE | Refills: 1 | Status: SHIPPED | OUTPATIENT
Start: 2022-07-05 | End: 2022-07-05

## 2022-07-05 NOTE — TELEPHONE ENCOUNTER
----- Message from Francisco Jordan MD sent at 6/29/2022  5:55 PM CDT -----  Regarding: discharged  Marivel,      Please see the discharge summary and my note. Her AMS cleared, possibly due to treatment of UTI vs decreasing gabapentin. Completely negative imaging workup. Also, her Ucx was negative but we decided to treat for total 7d course of cefpodoxime. Regardless Her Scr is still 1.3. I would biopsy for 1.5 or over. Please obtain labs next week. Thanks.     Francisco

## 2022-07-05 NOTE — PROGRESS NOTES
Elizabeth is a 66 year old who is being evaluated via a billable video visit.      How would you like to obtain your AVS? MyChart  If the video visit is dropped, the invitation should be resent by: Text to cell phone: [phone]  Will anyone else be joining your video visit?   Yes,  will join video visit         Assessment & Plan     Type 2 diabetes mellitus with diabetic polyneuropathy, without long-term current use of insulin (H)  Had recently increased gabapentin to 300 mg TID and dose was reduced back to 600 mg at bedtime. Will continue with this dose for now and continue to monitor for any mental status changes, at this point appears to be back at her baseline.   - gabapentin (NEURONTIN) 300 MG capsule; Take 2 capsules (600 mg) by mouth At Bedtime    Nausea  Nausea may be related to antibiotics; she has one dose left, so hopefully this will start to resolve as she completes the course today. Previously felt the ondansetron ODT was ineffective, can try 4 mg tablet as alternative. Compazine interacts with abilify (reduced seizure threshold), so will avoid that at this point. She agrees with the plan.   - ondansetron (ZOFRAN) 4 MG tablet; Take 1 tablet (4 mg) by mouth every 8 hours as needed for nausea or vomiting    History of kidney transplant  Stage 3 chronic kidney disease, unspecified whether stage 3a or 3b CKD (H)  Will clarify with Nephrology re: restarting Furosemide. Has had some labile creatinine levels over the last few months, but is complaining about increased peripheral edema and weight up about 5 lbs over the last week.        55 minutes spent on the date of the encounter doing chart review, history and exam, documentation and further activities per the note       Return in about 4 weeks (around 8/2/2022) for with PCP.    Tammy Parsons, GERHARD [facility] INTERNAL MEDICINE [facility]   Elizabeth is a 66 year old, presenting for the following health issues:  Video  "Visit      HPI     Admitted to [facility] 6/27-6/29/22 for acute toxic metabolic encephalopathy, recent falls, and UTI, in setting of prior renal transplant (2014).  Encephalopathy thought to be multifactorial--over-diuresis from Lasix, concern for med toxicity from gabapentin (had recently gone up to 300 mg TID from 600 mg at bedtime), and UTI. She improved with medication adjustments and starting antibiotics. Discharged on Cefpodoxime, one pill remaining. PT/OT had recommended TCU, however pt declined,  agreed with discharge home with home cares.    Had been doing pretty well overall initially. Urinary symptoms and weakness had improved.  However, reports she is not feeling well today. More nauseated. Not vomiting yet. Also had diarrhea this AM. No recent ill exposures. No fevers. No reported abdominal pain.     She was advised to hold her furosemide at the time of discharge, but was confused about whether this should be restarted. She has continued told for now but wanted clarity on that. Has been gaining weight but feels nauseated and isn't eating well. Feet and ankles are quite swollen, hard to wiggle toes. 194.7 lbs today (with clothing), up ~5 lbs from discharge.    Review of Systems   Constitutional, HEENT, cardiovascular, pulmonary, gi and gu systems are negative, except as otherwise noted.      Objective    Vitals - Patient Reported  Weight (Patient Reported): 86.2 kg (190 lb)  Height (Patient Reported): 154.9 cm (5' 1\")  BMI (Based on Pt Reported Ht/Wt): 35.9  Pain Score: No Pain (0)        Physical Exam   GENERAL: alert and no distress, lying on the couch  EYES: Eyes grossly normal to inspection.  No discharge or erythema, or obvious scleral/conjunctival abnormalities.  RESP: No audible wheeze, cough, or visible cyanosis.  No visible retractions or increased work of breathing.    SKIN: Visible skin clear. No significant rash, abnormal pigmentation or lesions.  NEURO: Cranial nerves grossly intact.  " Mentation and speech appropriate for age.  PSYCH: Mentation appears normal, affect normal/bright, judgement and insight intact, normal speech and appearance well-groomed.                Video-Visit Details    Video Start Time: 9:32 AM    Type of service:  Video Visit    Video End Time:9:57 AM    Originating Location (pt. Location): Home    Distant Location (provider location):  [facility] INTERNAL MEDICINE [place]     Platform used for Video Visit: [employer]  ..

## 2022-07-05 NOTE — TELEPHONE ENCOUNTER
Called Yanet and gave verbal orders per Dr. Sheridan for Order(s): Home Care Orders: Skilled Nursing: skilled nursing weekly          Bertin Doherty CMA ([facility]) at 8:56 AM on 7/5/2022

## 2022-07-05 NOTE — TELEPHONE ENCOUNTER
Called patient to schedule follow up appointment. Informed patient will have clinic call back.    Rosa Nunez VF

## 2022-07-05 NOTE — TELEPHONE ENCOUNTER
Francisco Jordan MD Sveiven, Marivel, MADHU Orta,      Please see the discharge summary and my note. Her AMS cleared, possibly due to treatment of UTI vs decreasing gabapentin. Completely negative imaging workup. Also, her Ucx was negative but we decided to treat for total 7d course of cefpodoxime. Regardless Her Scr is still 1.3. I would biopsy for 1.5 or over. Please obtain labs next week. Thanks.     Francisco     Patient aware she is to obtain labs tomorrow 7/6/2022    Lab orders placed.    Marivel Marcelo RN   Transplant Coordinator  [phone]

## 2022-07-05 NOTE — TELEPHONE ENCOUNTER
----- Message from Hebert Urbina MD sent at 7/5/2022  1:17 PM CDT -----  Regarding: RE: discharged  Restart furosemide 20 mg daily.  If her weight decreases by more then 4 lbs, have her let us know as we may change to every other day.    Luis  ----- Message -----  From: Marivel Marcelo RN  Sent: 7/5/2022  12:54 PM CDT  To: Hebert Urbina MD  Subject: FW: discharged                                   Charissa Pineda for the double message on Elizabeth,  I realized after my last message that she was hospitalized 6/23 the day after I was told to have her resume lasix at the lower dose of 20 mg daily on 6/22.  She was told inpatient to hold the lasix.    She is up 5 pounds and cannot wear her shoes.  UOP is down.  States BP is 120s/70s. She is obtaining transplant labs tomorrow morning.  Would you like to hold off to see lab results or resume lasix at 20 mg daily?    Marivel Marcelo RN   Transplant Coordinator  [phone]    ----- Message -----  From: Francisco Jordan MD  Sent: 6/29/2022   5:56 PM CDT  To: Marivel Marcelo RN  Subject: discharged                                       Marivel,      Please see the discharge summary and my note. Her AMS cleared, possibly due to treatment of UTI vs decreasing gabapentin. Completely negative imaging workup. Also, her Ucx was negative but we decided to treat for total 7d course of cefpodoxime. Regardless Her Scr is still 1.3. I would biopsy for 1.5 or over. Please obtain labs next week. Thanks.     Francisco    OUTCOME:  Patient v/u of restarting lasix 20 mg daily.  Patient instructed to weight herself daily as well as monitor BP.  Will record both.  Labs tomorrow at 0730.    Marivel Marcelo RN   Transplant Coordinator  [phone]

## 2022-07-05 NOTE — TELEPHONE ENCOUNTER
[facility] Call Center    Phone Message    May a detailed message be left on voicemail: yes     Reason for Call: Order(s): Home Care Orders: Skilled Nursing: skilled nursing weekly    Action Taken: Other: pcc    Travel Screening: Not Applicable

## 2022-07-05 NOTE — PATIENT INSTRUCTIONS
Use Zofran for the nausea.  Hopefully this will improve once you finish up the antibiotics.      Thank you for visiting the Primary Care Center today at the [facility]! The following is some information about our clinic:     Primary Care Center Frequently-Asked Questions    (1) How do I schedule appointments at the [facility]?     Primary Care--to schedule or make changes to an existing appointment, please call our primary care line at [phone].    Labs--to schedule a lab appointment at the [facility] you can use [employer] or call [phone]. If you have a [place] location that is closer to home, you can reach out to that location for scheduling options.     Imaging--if you need to schedule a CT, X-ray, MRI, ultrasound, or other imaging study you can call [phone] to schedule at the [facility] or any other [facility] imaging location.     Referrals--if a referral to another specialty was ordered you can expect a phone call from their scheduling team. If you have not heard from them in a week, please call us or send us a [employer] message to check the status or get a scheduling number. Please note that this only applies to internal [facility] referrals. If the referral is external you would need to contact their office for scheduling.     (2) I have a question about my visit, who do I contact?     You can call us at the primary care line at [phone] to ask questions about your visit. You can also send a secure message through [employer], which is reviewed by clinic staff. Please note that [employer] messages have a twenty-four to forty-eight business hour turnaround time and should not be used for urgent concerns.    (3) How will I get the results of my tests?    If you are signed up for [employer] all tests will be released to you within twenty-four hours of resulting. Please allow three to five days for your doctor to review your results  and place a note interpreting the results. If you do not have [employer] you will receive your results through mail seven to ten business days following the return of the tests. Please note that if there should be any urgent or concerning results that your doctor or their registered nurse will reach out to you the same day as the tests come back. If you have follow up questions about your results or would like to discuss the results in detail please schedule a follow up with your provider either in person or virtually.     (4) How do I get refills of my prescriptions?     You should always first contact your pharmacy for refills of your medications. If submitting a refill request on "[employer] please be sure to submit the request only once--repeat requests can cause delays in refill. If you are requesting a NEW medication or a medication related to new symptoms you will need to schedule an appointment with a provider prior to approval. Please note: Routine medication refills have up to one to three business day turnaround whereas controlled substances refills have up to five to seven business day turnaround.    (5) I have new symptoms, what do I do?     If you are having an immediate medical emergency, you should dial 911 for assistance.   For anything urgent that needs to be seen within a few hours to one day you should visit a local urgent care for assistance.  For non-urgent symptoms that need to be seen within a few days to a week you can schedule with an available provider in primary care by going to [employer] or calling [phone].   If you are not sure how serious your symptoms are or you would like to receive medical advice you can always call [phone] to speak with a triage nurse.

## 2022-07-06 ENCOUNTER — TELEPHONE (OUTPATIENT)
Dept: INTERNAL MEDICINE | Facility: CLINIC | Age: 67
End: 2022-07-06

## 2022-07-06 ENCOUNTER — LAB (OUTPATIENT)
Dept: LAB | Facility: CLINIC | Age: 67
End: 2022-07-06
Payer: MEDICARE

## 2022-07-06 DIAGNOSIS — Z94.0 KIDNEY REPLACED BY TRANSPLANT: ICD-10-CM

## 2022-07-06 LAB
ANION GAP SERPL CALCULATED.3IONS-SCNC: 2 MMOL/L (ref 3–14)
BUN SERPL-MCNC: 12 MG/DL (ref 7–30)
CALCIUM SERPL-MCNC: 8.8 MG/DL (ref 8.5–10.1)
CHLORIDE BLD-SCNC: 112 MMOL/L (ref 94–109)
CO2 SERPL-SCNC: 22 MMOL/L (ref 20–32)
CREAT SERPL-MCNC: 1.17 MG/DL (ref 0.52–1.04)
CYCLOSPORINE BLD LC/MS/MS-MCNC: 76 UG/L (ref 50–400)
ERYTHROCYTE [DISTWIDTH] IN BLOOD BY AUTOMATED COUNT: 14.2 % (ref 10–15)
GFR SERPL CREATININE-BSD FRML MDRD: 52 ML/MIN/1.73M2
GLUCOSE BLD-MCNC: 149 MG/DL (ref 70–99)
HCT VFR BLD AUTO: 35.8 % (ref 35–47)
HGB BLD-MCNC: 11.3 G/DL (ref 11.7–15.7)
MCH RBC QN AUTO: 30.5 PG (ref 26.5–33)
MCHC RBC AUTO-ENTMCNC: 31.6 G/DL (ref 31.5–36.5)
MCV RBC AUTO: 97 FL (ref 78–100)
PLATELET # BLD AUTO: 161 10E3/UL (ref 150–450)
POTASSIUM BLD-SCNC: 4.2 MMOL/L (ref 3.4–5.3)
RBC # BLD AUTO: 3.71 10E6/UL (ref 3.8–5.2)
SODIUM SERPL-SCNC: 136 MMOL/L (ref 133–144)
TME LAST DOSE: NORMAL H
TME LAST DOSE: NORMAL H
WBC # BLD AUTO: 4.2 10E3/UL (ref 4–11)

## 2022-07-06 PROCEDURE — 85027 COMPLETE CBC AUTOMATED: CPT | Performed by: PATHOLOGY

## 2022-07-06 PROCEDURE — 36415 COLL VENOUS BLD VENIPUNCTURE: CPT | Performed by: PATHOLOGY

## 2022-07-06 PROCEDURE — 80048 BASIC METABOLIC PNL TOTAL CA: CPT | Performed by: PATHOLOGY

## 2022-07-06 PROCEDURE — 80158 DRUG ASSAY CYCLOSPORINE: CPT | Performed by: INTERNAL MEDICINE

## 2022-07-06 NOTE — TELEPHONE ENCOUNTER
Spoke to patient to schedule a 4 week follow up per last visit with Tammy Parsons on 7/5/22. Patient is not available to make the appointment and does not have her calender in front of her. Patient request call back tomorrow. Will tried to call again tomorrow.    Schedule with Tammy Parsons or any providers as PCP is currently booked.

## 2022-07-07 ENCOUNTER — TELEPHONE (OUTPATIENT)
Dept: INTERNAL MEDICINE | Facility: CLINIC | Age: 67
End: 2022-07-07

## 2022-07-07 ENCOUNTER — TELEPHONE (OUTPATIENT)
Dept: DERMATOLOGY | Facility: CLINIC | Age: 67
End: 2022-07-07

## 2022-07-07 DIAGNOSIS — Z94.0 KIDNEY REPLACED BY TRANSPLANT: Primary | ICD-10-CM

## 2022-07-07 NOTE — TELEPHONE ENCOUNTER
Attempt to call patient to schedule annual follow up in the Dermatology Clinic per Dr Mooney last visit on 10/14/21 checkout comment dispositions. Left message with Dermatology number for patient to call back to schedule. Send [employer].

## 2022-07-07 NOTE — TELEPHONE ENCOUNTER
[facility] Call Center    Phone Message    May a detailed message be left on voicemail: yes     Reason for Call: Order(s): Home Care Orders: Physical Therapy (PT): continue PT 1 time a week for 3 weeks.    Action Taken: Message routed to:  [facility]    Travel Screening: Not Applicable

## 2022-07-07 NOTE — TELEPHONE ENCOUNTER
Verbal orders given to Lorelei from [facility], per Dr. Sheridan, for Order(s): Home Care Orders: Physical Therapy (PT): continue PT 1 time a week for 3 weeks. Rosi Daniels LPN 7/7/2022 2:08 PM

## 2022-07-08 ENCOUNTER — TELEPHONE (OUTPATIENT)
Dept: INTERNAL MEDICINE | Facility: CLINIC | Age: 67
End: 2022-07-08

## 2022-07-08 ENCOUNTER — MEDICAL CORRESPONDENCE (OUTPATIENT)
Dept: HEALTH INFORMATION MANAGEMENT | Facility: CLINIC | Age: 67
End: 2022-07-08

## 2022-07-08 NOTE — TELEPHONE ENCOUNTER
CC:  Ayden Sanchez is here today for Office Visit, Follow-up (medication check), and Blood Pressure (elevated)   Medications: medications verified, no change  Refills needed today?  NO  denies Latex allergy or sensitivity  Patient would like communication of their results via:      Cell Phone:   Telephone Information:   Mobile [phone]     Okay to leave a message containing results? Yes  Tobacco history: verified  Advanced directives: No        Patient's current [facility] status: Active.  Health Maintenance Due   Topic Date Due   • DTaP/Tdap/Td Vaccine (1 - Tdap) Never done   • Shingles Vaccine (1 of 2) Never done   • Colorectal Cancer Screen-  Never done   • Influenza Vaccine (1) 09/01/2021     Patient is due for topics listed above, he wishes to proceed with Colorectal Cancer Screening: Colonoscopy, but is not proceeding with Immunization(s) Dtap/Tdap/Td, Influenza and Shingles at this time. The following has occurred: Orders placed for Colorectal Cancer Screening: Colonoscopy..          [facility] status addressed. Patient Active.               [facility] Call [facility]    Phone Message    May a detailed message be left on voicemail: yes     Reason for Call: Order(s): Home Care Orders: Occupational Therapy (OT): 1x a week for 4 weeks.    Action Taken: Message routed to:  [facility]    Travel Screening: Not Applicable

## 2022-07-08 NOTE — TELEPHONE ENCOUNTER
Wrong number provided.          Bertin Doherty CMA ([facility]) at 1:03 PM on 7/8/2022

## 2022-07-10 ENCOUNTER — MEDICAL CORRESPONDENCE (OUTPATIENT)
Dept: HEALTH INFORMATION MANAGEMENT | Facility: CLINIC | Age: 67
End: 2022-07-10

## 2022-07-11 ENCOUNTER — TELEPHONE (OUTPATIENT)
Dept: DERMATOLOGY | Facility: CLINIC | Age: 67
End: 2022-07-11

## 2022-07-11 ENCOUNTER — MEDICAL CORRESPONDENCE (OUTPATIENT)
Dept: HEALTH INFORMATION MANAGEMENT | Facility: CLINIC | Age: 67
End: 2022-07-11

## 2022-07-11 NOTE — TELEPHONE ENCOUNTER
----- Message from Delta Downs CMA sent at 7/11/2022  3:42 PM CDT -----  Regarding: RE: Sooner appt  Do not use a GIN slot. You can use a [facility] HFU this one time.    Delta Downs CMA   ----- Message -----  From: Glenda Pack  Sent: 7/11/2022   1:08 PM CDT  To: Delta Downs CMA, #  Subject: RE: Sooner appt                                  Pt will be moving to [place] end of July to beginning of August. Can I use a GIN slot with anyone ?      ----- Message -----  From: Delta Downs CMA  Sent: 7/11/2022  12:38 PM CDT  To: Glenda Pack, [facility]  Subject: RE: Sooner appt                                  Patient can be seen by any provider.    Delta Downs CMA   ----- Message -----  From: Glenda Pack  Sent: 7/10/2022   8:46 AM CDT  To: Marija Mooney MD, [facility]  Subject: Sooner appt                                      Hello,    Pt last saw Dr Mooney on 10/14/21 and requesting sooner appt for annual skin checkup. Pt will be moving to [place] end of July to beginning of August. Offer another provider but first available is November and pt will not be in [place].     Thanks    Glenda

## 2022-07-11 NOTE — TELEPHONE ENCOUNTER
Spoke with patient and offer [facility] HFU with Dr Mooney. Patient decline and states 7am is too early. Patient will establish care with a new provider in [place] when she moved.

## 2022-07-12 ENCOUNTER — OFFICE VISIT (OUTPATIENT)
Dept: PODIATRY | Facility: CLINIC | Age: 67
End: 2022-07-12
Payer: MEDICARE

## 2022-07-12 DIAGNOSIS — E11.49 TYPE II OR UNSPECIFIED TYPE DIABETES MELLITUS WITH NEUROLOGICAL MANIFESTATIONS, NOT STATED AS UNCONTROLLED(250.60) (H): Primary | ICD-10-CM

## 2022-07-12 DIAGNOSIS — B35.1 ONYCHOMYCOSIS: ICD-10-CM

## 2022-07-12 DIAGNOSIS — L60.2 ONYCHAUXIS: ICD-10-CM

## 2022-07-12 DIAGNOSIS — E11.42 TYPE 2 DIABETES MELLITUS WITH DIABETIC POLYNEUROPATHY, WITHOUT LONG-TERM CURRENT USE OF INSULIN (H): ICD-10-CM

## 2022-07-12 DIAGNOSIS — M21.969 TYPE 2 DIABETES MELLITUS WITH DIABETIC FOOT DEFORMITY (H): ICD-10-CM

## 2022-07-12 DIAGNOSIS — E11.69 TYPE 2 DIABETES MELLITUS WITH DIABETIC FOOT DEFORMITY (H): ICD-10-CM

## 2022-07-12 PROCEDURE — 99214 OFFICE O/P EST MOD 30 MIN: CPT | Mod: 25 | Performed by: PODIATRIST

## 2022-07-12 PROCEDURE — 11720 DEBRIDE NAIL 1-5: CPT | Performed by: PODIATRIST

## 2022-07-12 ASSESSMENT — PAIN SCALES - GENERAL: PAINLEVEL: NO PAIN (0)

## 2022-07-12 NOTE — PROGRESS NOTES
Past Medical History:   Diagnosis Date     Abnormal MRI, cervical spine 10/15/2011    2011; mild changes noted. Study done for left arm symptoms Impression:  1. Mild multilevel degenerative disc disease with no significant canal or neural stenosis seen. motion artifact on the STIR images in these are not interpretable. The remaining images were interpreted      Autosomal dominant polycystic kidney disease 2011     (Problem list name updated by automated process. Provider to review and confirm.)     CMC DJD(carpometacarpal degenerative joint disease), localized primary 2013     -donor kidney transplant 2014     Gastroesophageal reflux disease      Generalized anxiety disorder 11/15/2012     Glaucoma      Hyperlipidemia 10/15/2011     Hyperparathyroidism, secondary (H) 2015     Hypertension     resolved     Immunosuppressed status (H) 2014     Major depressive disorder, recurrent episode, moderate (H) 11/15/2012     Obesity (BMI 30-39.9)      OP (osteoporosis) T score -3.8 2009 T-score -3.7      LION (obstructive sleep apnoea) 10/15/2012    reported intolerant to CPAP -- she says she doesn't have LION     Pain in joint, forearm -- L unhealed Fx 2013     Premature menopause age 37 07/10/2012    OCP (vaginal bldg)-->HT which she stopped 2 mo later documented at 2007 visit (age 51).      Restless leg syndrome      Stiffness of joint, not elsewhere classified, hand 2013     Tremor 10/15/2011    head     Type 2 DM with Neuropathy 1985    started with gestational diabetes     Uncomplicated asthma      Patient Active Problem List   Diagnosis     HTN, kidney transplant related     Hyperlipidemia     Abnormal MRI, cervical spine     Sensory loss     Major depressive disorder, recurrent episode, moderate (H)     Generalized anxiety disorder     CMC DJD(carpometacarpal degenerative joint disease), localized primary     Pain in joint, forearm -- L unhealed Fx      Kidney replaced by transplant     Immunosuppression (H)     Senile osteoporosis     Pain in joint involving ankle and foot     Nightmares associated with chronic post-traumatic stress disorder     DM type 2 with Neuropathy, Hgb A1C 7.3 on 1/25/21     Posttraumatic stress disorder     Plantar fasciitis, bilateral     Right knee pain     Age-related osteoporosis without current pathological fracture     Narcissistic personality disorder (H)     Personal history of other drug therapy     Median sensory neuropathy, left     Marital conflict     Suicidal ideation     Autosomal dominant polycystic kidney disease     Secondary hyperparathyroidism (H)     Morbid obesity (H)     CKD (chronic kidney disease) stage 3, GFR 30-59 ml/min (H)     Intractable back pain     Generalized muscle weakness     History of kidney transplant     IPMN (intraductal papillary mucinous neoplasm)     Elevated serum creatinine     AMS (altered mental status)     Past Surgical History:   Procedure Laterality Date     ABDOMEN SURGERY       ANKLE SURGERY       C/SECTION, LOW TRANSVERSE      x 2     CHOLECYSTECTOMY  1990     COLONOSCOPY       COLONOSCOPY N/A 6/8/2022    Procedure: COLONOSCOPY, WITH POLYPECTOMY;  Surgeon: Leventhal, Thomas Michael, MD;  Location: [facility] OR     ESOPHAGOSCOPY, GASTROSCOPY, DUODENOSCOPY (EGD), COMBINED N/A 05/19/2015    Procedure: COMBINED ESOPHAGOSCOPY, GASTROSCOPY, DUODENOSCOPY (EGD);  Surgeon: Sky Davey MD;  Location:  GI     ESOPHAGOSCOPY, GASTROSCOPY, DUODENOSCOPY (EGD), COMBINED N/A 05/19/2015    Procedure: COMBINED ESOPHAGOSCOPY, GASTROSCOPY, DUODENOSCOPY (EGD), BIOPSY SINGLE OR MULTIPLE;  Surgeon: Sky Davey MD;  Location:  GI     EYE SURGERY       LAPAROSCOPY, SURGICAL; REPAIR INCISIONAL OR VENTRAL HERNIA       LASER SURGERY OF EYE Left 10/01/2020    sever vitreous strands     ORTHOPEDIC SURGERY       HERMINIA EN Y BOWEL  1990     WRIST SURGERY       [facility] TRANSPLANTATION OF KIDNEY   03/2014     Social History     Socioeconomic History     Marital status:      Spouse name: Rahat     Number of children: 2     Years of education: Not on file     Highest education level: Not on file   Occupational History     Comment: part-time   Tobacco Use     Smoking status: Never Smoker     Smokeless tobacco: Never Used   Substance and Sexual Activity     Alcohol use: No     Alcohol/week: 0.0 standard drinks     Drug use: No     Sexual activity: Yes     Partners: Male     Birth control/protection: None     Comment: 1 partner   Other Topics Concern     Parent/sibling w/ CABG, MI or angioplasty before 65F 55M? 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)      Social Determinants of Health     Financial Resource Strain: Not on file   Food Insecurity: Not on file   Transportation Needs: Not on file   Physical Activity: Not on file   Stress: Not on file   Social Connections: Not on file   Intimate Partner Violence: Not on file   Housing Stability: Not on file     Family History   Problem Relation Age of Onset     Hyperlipidemia Mother      Diabetes Mother      Hypertension Mother      Genetic Disorder Father      Mental Illness Father      Diabetes Father      Hypertension Father      Mental Illness Other         family hx     Heart Disease Other      Diabetes Other      Cancer Other      Mental Illness Other      Diabetes Other      Glaucoma No family hx of      Macular Degeneration No family hx of      Lab Results   Component Value Date    A1C 7.3 04/19/2022    A1C 6.9 06/19/2021    A1C 7.3 01/25/2021    A1C 6.7 09/03/2020    A1C 7.1 12/17/2019    A1C 7.2 04/24/2019     Lab Results   Component Value Date    WBC 4.2 07/06/2022    WBC 7.1 06/29/2021     Lab Results   Component Value Date    RBC 3.71 07/06/2022    RBC 4.71 06/29/2021     Lab Results   Component Value Date    HGB 11.3 07/06/2022    HGB 13.9 06/29/2021     Lab Results   Component  Value Date    HCT 35.8 07/06/2022    HCT 42.0 06/29/2021     No components found for: MCT  Lab Results   Component Value Date    MCV 97 07/06/2022    MCV 89 06/29/2021     Lab Results   Component Value Date    MCH 30.5 07/06/2022    MCH 29.5 06/29/2021     Lab Results   Component Value Date    MCHC 31.6 07/06/2022    MCHC 33.1 06/29/2021     Lab Results   Component Value Date    RDW 14.2 07/06/2022    RDW 14.9 06/29/2021     Lab Results   Component Value Date     07/06/2022     06/29/2021     Last Comprehensive Metabolic Panel:  Sodium   Date Value Ref Range Status   07/06/2022 136 133 - 144 mmol/L Final   06/29/2021 139 133 - 144 mmol/L Final     Potassium   Date Value Ref Range Status   07/06/2022 4.2 3.4 - 5.3 mmol/L Final   06/29/2021 4.9 3.4 - 5.3 mmol/L Final     Chloride   Date Value Ref Range Status   07/06/2022 112 (H) 94 - 109 mmol/L Final   06/29/2021 109 94 - 109 mmol/L Final     Carbon Dioxide   Date Value Ref Range Status   06/29/2021 24 20 - 32 mmol/L Final     Carbon Dioxide (CO2)   Date Value Ref Range Status   07/06/2022 22 20 - 32 mmol/L Final     Anion Gap   Date Value Ref Range Status   07/06/2022 2 (L) 3 - 14 mmol/L Final   06/29/2021 6 3 - 14 mmol/L Final     Glucose   Date Value Ref Range Status   07/06/2022 149 (H) 70 - 99 mg/dL Final   06/29/2021 162 (H) 70 - 99 mg/dL Final     Urea Nitrogen   Date Value Ref Range Status   07/06/2022 12 7 - 30 mg/dL Final   06/29/2021 13 7 - 30 mg/dL Final     Creatinine   Date Value Ref Range Status   07/06/2022 1.17 (H) 0.52 - 1.04 mg/dL Final   06/29/2021 1.08 (H) 0.52 - 1.04 mg/dL Final     GFR Estimate   Date Value Ref Range Status   07/06/2022 52 (L) >60 mL/min/1.73m2 Final     Comment:     Effective December 21, 2021 eGFRcr in adults is calculated using the 2021 CKD-EPI creatinine equation which includes age and gender (Christal et al., NEJM, DOI: 10.1056/SYTHbu5725136)   06/29/2021 54 (L) >60 mL/min/[1.73_m2] Final     Comment:     Non   GFR Calc  Starting 12/18/2018, serum creatinine based estimated GFR (eGFR) will be   calculated using the Chronic Kidney Disease Epidemiology Collaboration   (CKD-EPI) equation.       Calcium   Date Value Ref Range Status   07/06/2022 8.8 8.5 - 10.1 mg/dL Final   06/29/2021 10.8 (H) 8.5 - 10.1 mg/dL Final         SUBJECTIVE FINDINGS:  This 66-year-old female returns to clinic for diabetic cares check and onychauxis, onychocryptosis. She relates she is getting pain in toenails. Relates no ulcers or sores since we have seen her last, no injuries. She relates she is wearing diabetic shoes. She relates she does have numbness and tingling in her feet.  She relates to using moisterizing lotion on feet.  She relates she has been in the hospital since we seen her last and she is on a diuretic now and her swelling is better.       OBJECTIVE FINDINGS:  DP and PT are 2/4 bilaterally. She has decreased ankle joint dorsiflexion bilaterally. She has dorsally contracted digits bilaterally 1-5. She has flexible flat foot with dorsal medial first MPJ prominence. There is no erythema, no drainage, no odor, no calor bilaterally. She has incurvated nails 1-5 bilaterally to differing degrees.  She has some thickening, dystrophy and subungual debris of toenails bilaterally.      ASSESSMENT AND PLAN: Onychauxis and Onychomycosis bilaterally causing pain. Diabetes with peripheral neuropathy and has Hammertoes present. Diagnosis and treatment discussed with her. All the nails were reduced or debrided bilaterally upon consent.  Prescription for new Diabetic shoes given and use discussed with her.  Relates they will be moving to [place].  Return to clinic to see physician in [place] in 2-3 months.  Previous notes reviewed.         Moderate level of medical decision making with Number and Complexity of  Problems Addressed- moderate, amount and/or complexity of data to be reviewed and analyzed- moderate, and Risk of Complications  and/or  Morbidity or Mortality of  Patient Management- moderate.

## 2022-07-12 NOTE — NURSING NOTE
Elizabeth Palma's chief complaint for this visit includes:  Chief Complaint   Patient presents with     Left Foot - Follow Up     Right Foot - Follow Up     PCP: Horacio Sheridan    Referring Provider:  No referring provider defined for this encounter.    LMP  (LMP Unknown)   No Pain (0)     Do you need any medication refills at today's visit? NO    Allergies   Allergen Reactions     Percocet [Oxycodone-Acetaminophen] Nausea and Vomiting     Novocain [Procaine Hcl] Hives     Had reaction 25 years ago to old renetta. Pt reports multiple injections of lidocaine since then without reaction.  Tolerated lidocaine injection today without difficulty.  Osmar Mark MD IR Service.       Cheko Mcfarlane, EMT

## 2022-07-12 NOTE — LETTER
2022         RE: Elizabeth Palma  [address]  [address]  [facility] [address]        Dear Colleague,    Thank you for referring your patient, Elizabeth Palma, to the [facility]. Please see a copy of my visit note below.    Past Medical History:   Diagnosis Date     Abnormal MRI, cervical spine 10/15/2011    2011; mild changes noted. Study done for left arm symptoms Impression:  1. Mild multilevel degenerative disc disease with no significant canal or neural stenosis seen. motion artifact on the STIR images in these are not interpretable. The remaining images were interpreted      Autosomal dominant polycystic kidney disease 2011     (Problem list name updated by automated process. Provider to review and confirm.)     CMC DJD(carpometacarpal degenerative joint disease), localized primary 2013     -donor kidney transplant 2014     Gastroesophageal reflux disease      Generalized anxiety disorder 11/15/2012     Glaucoma      Hyperlipidemia 10/15/2011     Hyperparathyroidism, secondary (H) 2015     Hypertension     resolved     Immunosuppressed status (H) 2014     Major depressive disorder, recurrent episode, moderate (H) 11/15/2012     Obesity (BMI 30-39.9)      OP (osteoporosis) T score -3.8 2009 T-score -3.7      LION (obstructive sleep apnoea) 10/15/2012    reported intolerant to CPAP -- she says she doesn't have LION     Pain in joint, forearm -- L unhealed Fx 2013     Premature menopause age 37 07/10/2012    OCP (vaginal bldg)-->HT which she stopped 2 mo later documented at 2007 visit (age 51).      Restless leg syndrome      Stiffness of joint, not elsewhere classified, hand 2013     Tremor 10/15/2011    head     Type 2 DM with Neuropathy 1985    started with gestational diabetes     Uncomplicated asthma      Patient Active Problem List   Diagnosis     HTN, kidney transplant related      Hyperlipidemia     Abnormal MRI, cervical spine     Sensory loss     Major depressive disorder, recurrent episode, moderate (H)     Generalized anxiety disorder     CMC DJD(carpometacarpal degenerative joint disease), localized primary     Pain in joint, forearm -- L unhealed Fx     Kidney replaced by transplant     Immunosuppression (H)     Senile osteoporosis     Pain in joint involving ankle and foot     Nightmares associated with chronic post-traumatic stress disorder     DM type 2 with Neuropathy, Hgb A1C 7.3 on 1/25/21     Posttraumatic stress disorder     Plantar fasciitis, bilateral     Right knee pain     Age-related osteoporosis without current pathological fracture     Narcissistic personality disorder (H)     Personal history of other drug therapy     Median sensory neuropathy, left     Marital conflict     Suicidal ideation     Autosomal dominant polycystic kidney disease     Secondary hyperparathyroidism (H)     Morbid obesity (H)     CKD (chronic kidney disease) stage 3, GFR 30-59 ml/min (H)     Intractable back pain     Generalized muscle weakness     History of kidney transplant     IPMN (intraductal papillary mucinous neoplasm)     Elevated serum creatinine     AMS (altered mental status)     Past Surgical History:   Procedure Laterality Date     ABDOMEN SURGERY       ANKLE SURGERY       C/SECTION, LOW TRANSVERSE      x 2     CHOLECYSTECTOMY  1990     COLONOSCOPY       COLONOSCOPY N/A 6/8/2022    Procedure: COLONOSCOPY, WITH POLYPECTOMY;  Surgeon: Leventhal, Thomas Michael, MD;  Location: [facility] OR     ESOPHAGOSCOPY, GASTROSCOPY, DUODENOSCOPY (EGD), COMBINED N/A 05/19/2015    Procedure: COMBINED ESOPHAGOSCOPY, GASTROSCOPY, DUODENOSCOPY (EGD);  Surgeon: Sky Davey MD;  Location:  GI     ESOPHAGOSCOPY, GASTROSCOPY, DUODENOSCOPY (EGD), COMBINED N/A 05/19/2015    Procedure: COMBINED ESOPHAGOSCOPY, GASTROSCOPY, DUODENOSCOPY (EGD), BIOPSY SINGLE OR MULTIPLE;  Surgeon: Sky Davey MD;   Location:  GI     EYE SURGERY       LAPAROSCOPY, SURGICAL; REPAIR INCISIONAL OR VENTRAL HERNIA       LASER SURGERY OF EYE Left 10/01/2020    sever vitreous strands     ORTHOPEDIC SURGERY       HERMINIA EN Y BOWEL  1990     WRIST SURGERY       [facility] TRANSPLANTATION OF KIDNEY  03/2014     Social History     Socioeconomic History     Marital status:      Spouse name: Rahat     Number of children: 2     Years of education: Not on file     Highest education level: Not on file   Occupational History     Comment: part-time   Tobacco Use     Smoking status: Never Smoker     Smokeless tobacco: Never Used   Substance and Sexual Activity     Alcohol use: No     Alcohol/week: 0.0 standard drinks     Drug use: No     Sexual activity: Yes     Partners: Male     Birth control/protection: None     Comment: 1 partner   Other Topics Concern     Parent/sibling w/ CABG, MI or angioplasty before 65F 55M? 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)      Social Determinants of Health     Financial Resource Strain: Not on file   Food Insecurity: Not on file   Transportation Needs: Not on file   Physical Activity: Not on file   Stress: Not on file   Social Connections: Not on file   Intimate Partner Violence: Not on file   Housing Stability: Not on file     Family History   Problem Relation Age of Onset     Hyperlipidemia Mother      Diabetes Mother      Hypertension Mother      Genetic Disorder Father      Mental Illness Father      Diabetes Father      Hypertension Father      Mental Illness Other         family hx     Heart Disease Other      Diabetes Other      Cancer Other      Mental Illness Other      Diabetes Other      Glaucoma No family hx of      Macular Degeneration No family hx of      Lab Results   Component Value Date    A1C 7.3 04/19/2022    A1C 6.9 06/19/2021    A1C 7.3 01/25/2021    A1C 6.7 09/03/2020    A1C 7.1 12/17/2019    A1C 7.2 04/24/2019     Lab  Results   Component Value Date    WBC 4.2 07/06/2022    WBC 7.1 06/29/2021     Lab Results   Component Value Date    RBC 3.71 07/06/2022    RBC 4.71 06/29/2021     Lab Results   Component Value Date    HGB 11.3 07/06/2022    HGB 13.9 06/29/2021     Lab Results   Component Value Date    HCT 35.8 07/06/2022    HCT 42.0 06/29/2021     No components found for: MCT  Lab Results   Component Value Date    MCV 97 07/06/2022    MCV 89 06/29/2021     Lab Results   Component Value Date    MCH 30.5 07/06/2022    MCH 29.5 06/29/2021     Lab Results   Component Value Date    MCHC 31.6 07/06/2022    MCHC 33.1 06/29/2021     Lab Results   Component Value Date    RDW 14.2 07/06/2022    RDW 14.9 06/29/2021     Lab Results   Component Value Date     07/06/2022     06/29/2021     Last Comprehensive Metabolic Panel:  Sodium   Date Value Ref Range Status   07/06/2022 136 133 - 144 mmol/L Final   06/29/2021 139 133 - 144 mmol/L Final     Potassium   Date Value Ref Range Status   07/06/2022 4.2 3.4 - 5.3 mmol/L Final   06/29/2021 4.9 3.4 - 5.3 mmol/L Final     Chloride   Date Value Ref Range Status   07/06/2022 112 (H) 94 - 109 mmol/L Final   06/29/2021 109 94 - 109 mmol/L Final     Carbon Dioxide   Date Value Ref Range Status   06/29/2021 24 20 - 32 mmol/L Final     Carbon Dioxide (CO2)   Date Value Ref Range Status   07/06/2022 22 20 - 32 mmol/L Final     Anion Gap   Date Value Ref Range Status   07/06/2022 2 (L) 3 - 14 mmol/L Final   06/29/2021 6 3 - 14 mmol/L Final     Glucose   Date Value Ref Range Status   07/06/2022 149 (H) 70 - 99 mg/dL Final   06/29/2021 162 (H) 70 - 99 mg/dL Final     Urea Nitrogen   Date Value Ref Range Status   07/06/2022 12 7 - 30 mg/dL Final   06/29/2021 13 7 - 30 mg/dL Final     Creatinine   Date Value Ref Range Status   07/06/2022 1.17 (H) 0.52 - 1.04 mg/dL Final   06/29/2021 1.08 (H) 0.52 - 1.04 mg/dL Final     GFR Estimate   Date Value Ref Range Status   07/06/2022 52 (L) >60 mL/min/1.73m2  Final     Comment:     Effective December 21, 2021 eGFRcr in adults is calculated using the 2021 CKD-EPI creatinine equation which includes age and gender (Christal et al., NEJ, DOI: 10.1056/BGWWuz9421736)   06/29/2021 54 (L) >60 mL/min/[1.73_m2] Final     Comment:     Non  GFR Calc  Starting 12/18/2018, serum creatinine based estimated GFR (eGFR) will be   calculated using the Chronic Kidney Disease Epidemiology Collaboration   (CKD-EPI) equation.       Calcium   Date Value Ref Range Status   07/06/2022 8.8 8.5 - 10.1 mg/dL Final   06/29/2021 10.8 (H) 8.5 - 10.1 mg/dL Final         SUBJECTIVE FINDINGS:  This 66-year-old female returns to clinic for diabetic cares check and onychauxis, onychocryptosis. She relates she is getting pain in toenails. Relates no ulcers or sores since we have seen her last, no injuries. She relates she is wearing diabetic shoes. She relates she does have numbness and tingling in her feet.  She relates to using moisterizing lotion on feet.  She relates she has been in the hospital since we seen her last and she is on a diuretic now and her swelling is better.       OBJECTIVE FINDINGS:  DP and PT are 2/4 bilaterally. She has decreased ankle joint dorsiflexion bilaterally. She has dorsally contracted digits bilaterally 1-5. She has flexible flat foot with dorsal medial first MPJ prominence. There is no erythema, no drainage, no odor, no calor bilaterally. She has incurvated nails 1-5 bilaterally to differing degrees.  She has some thickening, dystrophy and subungual debris of toenails bilaterally.      ASSESSMENT AND PLAN: Onychauxis and Onychomycosis bilaterally causing pain. Diabetes with peripheral neuropathy and has Hammertoes present. Diagnosis and treatment discussed with her. All the nails were reduced or debrided bilaterally upon consent.  Prescription for new Diabetic shoes given and use discussed with her.  Relates they will be moving to [place].  Return to clinic to  see physician in [place] in 2-3 months.  Previous notes reviewed.         Moderate level of medical decision making with Number and Complexity of  Problems Addressed- moderate, amount and/or complexity of data to be reviewed and analyzed- moderate, and Risk of Complications and/or  Morbidity or Mortality of  Patient Management- moderate.      Again, thank you for allowing me to participate in the care of your patient.        Sincerely,        Javier Tuttle DPM

## 2022-07-14 DIAGNOSIS — F51.5 NIGHTMARES ASSOCIATED WITH CHRONIC POST-TRAUMATIC STRESS DISORDER: ICD-10-CM

## 2022-07-14 DIAGNOSIS — F43.12 NIGHTMARES ASSOCIATED WITH CHRONIC POST-TRAUMATIC STRESS DISORDER: ICD-10-CM

## 2022-07-14 RX ORDER — PRAZOSIN HYDROCHLORIDE 5 MG/1
CAPSULE ORAL
Qty: 90 CAPSULE | Refills: 3 | Status: SHIPPED | OUTPATIENT
Start: 2022-07-14 | End: 2022-08-04

## 2022-07-14 RX ORDER — GABAPENTIN 300 MG/1
CAPSULE ORAL
Qty: 180 CAPSULE | Refills: 0 | OUTPATIENT
Start: 2022-07-14

## 2022-07-14 NOTE — TELEPHONE ENCOUNTER
Patient is out of medication , pharmacy told them to contact us for refills that  Rx was closed out.

## 2022-07-14 NOTE — TELEPHONE ENCOUNTER
Last seen: 03/03/2022  RTC: undocumented   Cancel: none  No-show: none  Next appt: 08/04/2022    Incoming refill from patient via phone    Medication requested: Prazosin 5 MG capsule   Directions: take 3 tablets of 5 mg (15 mg) by mouth daily with 1 tablet of 1 mg and 1 2 mg tablet for a total dose of 17 mg/   Qty: 90  Last refilled: 03/03/2022    Medication refill approved per refill protocol

## 2022-07-15 ENCOUNTER — TELEPHONE (OUTPATIENT)
Dept: INTERNAL MEDICINE | Facility: CLINIC | Age: 67
End: 2022-07-15

## 2022-07-15 DIAGNOSIS — F43.12 NIGHTMARES ASSOCIATED WITH CHRONIC POST-TRAUMATIC STRESS DISORDER: Primary | ICD-10-CM

## 2022-07-15 DIAGNOSIS — Z94.0 KIDNEY REPLACED BY TRANSPLANT: ICD-10-CM

## 2022-07-15 DIAGNOSIS — F42.4 EXCORIATION (SKIN-PICKING) DISORDER: ICD-10-CM

## 2022-07-15 DIAGNOSIS — F51.5 NIGHTMARES ASSOCIATED WITH CHRONIC POST-TRAUMATIC STRESS DISORDER: Primary | ICD-10-CM

## 2022-07-15 NOTE — TELEPHONE ENCOUNTER
Called [facility] and gave verbal orders per Dr. Sheridan for Order(s): Home Care Orders: Skilled Nursing:    SN 1x a week for 2 weeks,      Bertin Doherty CMA ([facility]) at 9:37 AM on 7/15/2022

## 2022-07-15 NOTE — TELEPHONE ENCOUNTER
Patient reported new dosage:    Cyclosporine Modified 25mg  4BID     Please verify and send new rx.

## 2022-07-15 NOTE — TELEPHONE ENCOUNTER
What is the concern that needs to be addressed by a nurse? Pt is out of prazosin 2mg and was told by pharmacy the rx was cancelled, please call pt back to discuss why that happened.     May a detailed message be left on voicemail? yes    Date of last office visit: 3/3/22    Message routed to: psych rn pool

## 2022-07-15 NOTE — TELEPHONE ENCOUNTER
[facility] Call Center    Phone Message    May a detailed message be left on voicemail: yes     Reason for Call: Order(s): Home Care Orders: Skilled Nursing:    SN 1x a week for 2 weeks, please call with verbal orders thank you.  Action Taken: Message routed to:  [facility] pcc    Travel Screening: Not Applicable

## 2022-07-17 NOTE — PROGRESS NOTES
[facility] Dermatology Note  Encounter Date: Jul 19, 2022  Office Visit     Dermatology Problem List:  # Renal transplant 3/20/14, on cyclosporine and mycophenolic acid  ____________________________________________    Assessment & Plan:    # Seborrheic keratosis, non irritated. Discussed the natural history and benign nature of this lesion. Reassurance provided that no additional treatment is necessary.     # Benign-appearing yellow papule on right perioral cheek. No concerning features clinically or on dermoscopy.     # Hx of renal transplant, currently on cyclosporine and cellcept. Discussed higher risk for cutaneous malignancy due to loss of normal immune surveillance while on these medications. Discussed health of the transplanted organ is paramount and comes before risk of skin cancers, which can be detected and managed if they develop.  - Photoprotection discussed (sunscreen, sun avoidance)  - Advised skin checks at regular yearly intervals. Will continue to monitor for changing or symptomatic lesions.     Follow-up: recommended follow up in one year with a dermatologist in [place].     Staff and Scribe:     Scribe Disclosure:  I, Chayo Lynch, am serving as a scribe to prep the notes for Lonny Mcduffie MD .    Rosanna Ruiz MD, PGY-3  [facility]  July 19, 2022      Staff Physician Comments:   I saw and evaluated the patient with the resident and I agree with the assessment and plan.  I was present for the examination. I have made edits if needed.    Lonny Mcduffie MD  Staff Dermatologist and Dermatopathologist  , Department of Dermatology   ____________________________________________    CC: Skin Check (Elizabeth is here for a skin check. )    HPI:  Ms. Elizabeth Palma is a(n) 66 year old female who presents today as a return patient for annual skin check. Last seen by Dr. Mooney at which point she had a benign skin exam. Present to clinic with   today who provides supplemental history. History of renal transplant in 2014, on cyclosporine and mycophenolic acid. She denies any new lesions of concern. No bleeding, tender, or growing lesions. No significant sun exposure.    Patient planning to move to [place] soon to be closer to her son.    Patient is otherwise feeling well, without additional skin concerns.    Labs Reviewed:  N/A    Physical Exam:  Vitals: LMP  (LMP Unknown)   SKIN: Full skin, which includes the head/face, both arms, chest, back, abdomen, both legs, genitalia and/or groin buttocks, digits and/or nails, was examined.  - yellow globular appearing papule on the right side of face near oral commissure   - Multiple regular brown pigmented macules and papules are identified on the back, arms, and legs.   - There are few waxy stuck on tan to brown papules on the back.   - No other lesions of concern on areas examined.     Medications:  Current Outpatient Medications   Medication     acetaminophen (TYLENOL) 500 MG tablet     apixaban ANTICOAGULANT (ELIQUIS) 5 MG tablet     ARIPiprazole (ABILIFY) 2 MG tablet     ascorbic acid (VITAMIN C) 250 MG CHEW chewable tablet     beclomethasone HFA (QVAR REDIHALER) 40 MCG/ACT inhaler     cefpodoxime (VANTIN) 200 MG tablet     cycloSPORINE modified (GENERIC EQUIVALENT) 25 MG capsule     denosumab (PROLIA) 60 MG/ML SOSY injection     ferrous sulfate (FEROSUL) 325 (65 Fe) MG tablet     fluticasone (FLONASE) 50 MCG/ACT nasal spray     fluticasone (FLOVENT HFA) 220 MCG/ACT inhaler     furosemide (LASIX) 20 MG tablet     gabapentin (NEURONTIN) 300 MG capsule     Lactobacillus (ACIDOPHILUS) 100 MG CAPS     latanoprost (XALATAN) 0.005 % ophthalmic solution     levalbuterol (XOPENEX HFA) 45 MCG/ACT inhaler     LORazepam (ATIVAN) 0.5 MG tablet     metFORMIN (GLUCOPHAGE XR) 500 MG 24 hr tablet     mycophenolic acid (GENERIC EQUIVALENT) 180 MG EC tablet     mycophenolic acid (GENERIC EQUIVALENT) 180 MG EC tablet       600 MG CAPS capsule     ondansetron (ZOFRAN) 4 MG tablet     order for DME     pantoprazole (PROTONIX) 40 MG EC tablet     Polyvinyl Alcohol-Povidone (REFRESH OP)     prazosin (MINIPRESS) 2 MG capsule     prazosin (MINIPRESS) 5 MG capsule     propranolol (INDERAL) 20 MG tablet     psyllium (METAMUCIL/KONSYL) capsule     simvastatin (ZOCOR) 20 MG tablet     sodium bicarbonate 650 MG tablet     sulfamethoxazole-trimethoprim (BACTRIM) 400-80 MG tablet     topiramate (TOPAMAX) 200 MG tablet     vilazodone (VIIBRYD) 10 MG TABS tablet     vilazodone (VIIBRYD) 40 MG TABS tablet     Current Facility-Administered Medications   Medication     botulinum toxin type A (BOTOX) 100 units injection 100 Units      Past Medical History:   Patient Active Problem List   Diagnosis     HTN, kidney transplant related     Hyperlipidemia     Abnormal MRI, cervical spine     Sensory loss     Major depressive disorder, recurrent episode, moderate (H)     Generalized anxiety disorder     CMC DJD(carpometacarpal degenerative joint disease), localized primary     Pain in joint, forearm -- L unhealed Fx     Kidney replaced by transplant     Immunosuppression (H)     Senile osteoporosis     Pain in joint involving ankle and foot     Nightmares associated with chronic post-traumatic stress disorder     DM type 2 with Neuropathy, Hgb A1C 7.3 on 1/25/21     Posttraumatic stress disorder     Plantar fasciitis, bilateral     Right knee pain     Age-related osteoporosis without current pathological fracture     Narcissistic personality disorder (H)     Personal history of other drug therapy     Median sensory neuropathy, left     Marital conflict     Suicidal ideation     Autosomal dominant polycystic kidney disease     Secondary hyperparathyroidism (H)     Morbid obesity (H)     CKD (chronic kidney disease) stage 3, GFR 30-59 ml/min (H)     Intractable back pain     Generalized muscle weakness     History of kidney transplant     IPMN  (intraductal papillary mucinous neoplasm)     Elevated serum creatinine     AMS (altered mental status)     Past Medical History:   Diagnosis Date     Abnormal MRI, cervical spine 10/15/2011    2011; mild changes noted. Study done for left arm symptoms Impression:  1. Mild multilevel degenerative disc disease with no significant canal or neural stenosis seen. motion artifact on the STIR images in these are not interpretable. The remaining images were interpreted      Autosomal dominant polycystic kidney disease 2011     (Problem list name updated by automated process. Provider to review and confirm.)     CMC DJD(carpometacarpal degenerative joint disease), localized primary 2013     -donor kidney transplant 2014     Gastroesophageal reflux disease      Generalized anxiety disorder 11/15/2012     Glaucoma      Hyperlipidemia 10/15/2011     Hyperparathyroidism, secondary (H) 2015     Hypertension     resolved     Immunosuppressed status (H) 2014     Major depressive disorder, recurrent episode, moderate (H) 11/15/2012     Obesity (BMI 30-39.9)      OP (osteoporosis) T score -3.8 2009 T-score -3.7      LION (obstructive sleep apnoea) 10/15/2012    reported intolerant to CPAP -- she says she doesn't have LION     Pain in joint, forearm -- L unhealed Fx 2013     Premature menopause age 37 07/10/2012    OCP (vaginal bldg)-->HT which she stopped 2 mo later documented at 2007 visit (age 51).      Restless leg syndrome      Stiffness of joint, not elsewhere classified, hand 2013     Tremor 10/15/2011    head     Type 2 DM with Neuropathy 1985    started with gestational diabetes     Uncomplicated asthma         CC No referring provider defined for this encounter. on close of this encounter.

## 2022-07-18 DIAGNOSIS — Z94.0 KIDNEY REPLACED BY TRANSPLANT: Primary | ICD-10-CM

## 2022-07-18 RX ORDER — CYCLOSPORINE 25 MG/1
100 CAPSULE, LIQUID FILLED ORAL 2 TIMES DAILY
Qty: 240 CAPSULE | Refills: 11 | Status: SHIPPED | OUTPATIENT
Start: 2022-07-18 | End: 2022-08-19

## 2022-07-18 RX ORDER — CYCLOSPORINE 25 MG/1
125 CAPSULE, LIQUID FILLED ORAL 2 TIMES DAILY
Qty: 300 CAPSULE | Refills: 11 | Status: SHIPPED | OUTPATIENT
Start: 2022-07-18 | End: 2022-07-18

## 2022-07-18 RX ORDER — PRAZOSIN HYDROCHLORIDE 2 MG/1
2 CAPSULE ORAL AT BEDTIME
Qty: 30 CAPSULE | Refills: 3 | Status: SHIPPED | OUTPATIENT
Start: 2022-07-18 | End: 2022-08-04

## 2022-07-18 NOTE — TELEPHONE ENCOUNTER
Writer returned call to patient.  Patient reports that she has been out of the medication for 3 days.  She is wondering what her next steps would be.      Let patient know that writer has sent a message to Dr. Hatch, as it looks like we did not alter her script, but someone at the hospital did.  Patient verbalized understanding.

## 2022-07-18 NOTE — TELEPHONE ENCOUNTER
Writer called patient to let her know that prescription was sent to [facility].  She thanked writer and did not have any further questions.

## 2022-07-18 NOTE — TELEPHONE ENCOUNTER
Issue:  Elizabeth Palma requesting CSA refill at 100 mg bid.   There are no notes to reflect dose of 100 mg bid.    Plan/LPN task:  Please call to verify Elizabeth Palma has in fact been on 100 mg CSA twice daily. Would recommend repeat BMP and CSA in August to ensure drug level is stable.     Please update med list.

## 2022-07-18 NOTE — TELEPHONE ENCOUNTER
Writer reviewed chart, and we have not entered this prescription for some time.  Writer sent script to Dr. Hatch for review

## 2022-07-18 NOTE — TELEPHONE ENCOUNTER
600 MG  Last refilled: 3/31/22  Qty: 60: 3    Last seen: 3/3/22  RTC: 1 MOS  Cancel: 0  No-show: 0  Next appt: 8/4/22  Refill pended and routed to the provider for review/determination due to :  Pt outside of RTC timeframe ( April 2022)

## 2022-07-19 ENCOUNTER — OFFICE VISIT (OUTPATIENT)
Dept: DERMATOLOGY | Facility: CLINIC | Age: 67
End: 2022-07-19
Payer: MEDICARE

## 2022-07-19 DIAGNOSIS — L81.4 SOLAR LENTIGO: ICD-10-CM

## 2022-07-19 DIAGNOSIS — L73.8 SEBACEOUS HYPERPLASIA: ICD-10-CM

## 2022-07-19 DIAGNOSIS — L82.1 SK (SEBORRHEIC KERATOSIS): Primary | ICD-10-CM

## 2022-07-19 DIAGNOSIS — D22.9 MULTIPLE BENIGN MELANOCYTIC NEVI: ICD-10-CM

## 2022-07-19 PROCEDURE — 99213 OFFICE O/P EST LOW 20 MIN: CPT | Mod: GC | Performed by: DERMATOLOGY

## 2022-07-19 RX ORDER — ACETYLCYSTEINE 600 MG
CAPSULE ORAL
Qty: 60 CAPSULE | Refills: 0 | Status: SHIPPED | OUTPATIENT
Start: 2022-07-19 | End: 2022-08-04

## 2022-07-19 ASSESSMENT — PAIN SCALES - GENERAL: PAINLEVEL: NO PAIN (0)

## 2022-07-19 NOTE — LETTER
7/19/2022       RE: Elizabeth Palma  [address]  [address]  [facility] [address]     Dear Colleague,    Thank you for referring your patient, Elizabeth Palma, to the [facility] [place] at [facility]. Please see a copy of my visit note below.    [facility] Dermatology Note  Encounter Date: Jul 19, 2022  Office Visit     Dermatology Problem List:  # Renal transplant 3/20/14, on cyclosporine and mycophenolic acid  ____________________________________________    Assessment & Plan:    # Seborrheic keratosis, non irritated. Discussed the natural history and benign nature of this lesion. Reassurance provided that no additional treatment is necessary.     # Benign-appearing yellow papule on right perioral cheek. No concerning features clinically or on dermoscopy.     # Hx of renal transplant, currently on cyclosporine and cellcept. Discussed higher risk for cutaneous malignancy due to loss of normal immune surveillance while on these medications. Discussed health of the transplanted organ is paramount and comes before risk of skin cancers, which can be detected and managed if they develop.  - Photoprotection discussed (sunscreen, sun avoidance)  - Advised skin checks at regular yearly intervals. Will continue to monitor for changing or symptomatic lesions.     Follow-up: recommended follow up in one year with a dermatologist in [place].     Staff and Scribe:     Scribe Disclosure:  I, Chayo Lynch, am serving as a scribe to prep the notes for Lonny Mcduffie MD .    Rosanna Ruiz MD, PGY-3  [facility]  July 19, 2022      Staff Physician Comments:   I saw and evaluated the patient with the resident and I agree with the assessment and plan.  I was present for the examination. I have made edits if needed.    Lonny Mcduffie MD  Staff Dermatologist and Dermatopathologist  ,  Department of Dermatology   ____________________________________________    CC: Skin Check (Elizabeth is here for a skin check. )    HPI:  Ms. Elizabeth Palma is a(n) 66 year old female who presents today as a return patient for annual skin check. Last seen by Dr. Mooney at which point she had a benign skin exam. Present to clinic with  today who provides supplemental history. History of renal transplant in 2014, on cyclosporine and mycophenolic acid. She denies any new lesions of concern. No bleeding, tender, or growing lesions. No significant sun exposure.    Patient planning to move to [place] soon to be closer to her son.    Patient is otherwise feeling well, without additional skin concerns.    Labs Reviewed:  N/A    Physical Exam:  Vitals: LMP  (LMP Unknown)   SKIN: Full skin, which includes the head/face, both arms, chest, back, abdomen, both legs, genitalia and/or groin buttocks, digits and/or nails, was examined.  - yellow globular appearing papule on the right side of face near oral commissure   - Multiple regular brown pigmented macules and papules are identified on the back, arms, and legs.   - There are few waxy stuck on tan to brown papules on the back.   - No other lesions of concern on areas examined.     Medications:  Current Outpatient Medications   Medication     acetaminophen (TYLENOL) 500 MG tablet     apixaban ANTICOAGULANT (ELIQUIS) 5 MG tablet     ARIPiprazole (ABILIFY) 2 MG tablet     ascorbic acid (VITAMIN C) 250 MG CHEW chewable tablet     beclomethasone HFA (QVAR REDIHALER) 40 MCG/ACT inhaler     cefpodoxime (VANTIN) 200 MG tablet     cycloSPORINE modified (GENERIC EQUIVALENT) 25 MG capsule     denosumab (PROLIA) 60 MG/ML SOSY injection     ferrous sulfate (FEROSUL) 325 (65 Fe) MG tablet     fluticasone (FLONASE) 50 MCG/ACT nasal spray     fluticasone (FLOVENT HFA) 220 MCG/ACT inhaler     furosemide (LASIX) 20 MG tablet     gabapentin (NEURONTIN) 300 MG capsule     Lactobacillus  (ACIDOPHILUS) 100 MG CAPS     latanoprost (XALATAN) 0.005 % ophthalmic solution     levalbuterol (XOPENEX HFA) 45 MCG/ACT inhaler     LORazepam (ATIVAN) 0.5 MG tablet     metFORMIN (GLUCOPHAGE XR) 500 MG 24 hr tablet     mycophenolic acid (GENERIC EQUIVALENT) 180 MG EC tablet     mycophenolic acid (GENERIC EQUIVALENT) 180 MG EC tablet      600 MG CAPS capsule     ondansetron (ZOFRAN) 4 MG tablet     order for DME     pantoprazole (PROTONIX) 40 MG EC tablet     Polyvinyl Alcohol-Povidone (REFRESH OP)     prazosin (MINIPRESS) 2 MG capsule     prazosin (MINIPRESS) 5 MG capsule     propranolol (INDERAL) 20 MG tablet     psyllium (METAMUCIL/KONSYL) capsule     simvastatin (ZOCOR) 20 MG tablet     sodium bicarbonate 650 MG tablet     sulfamethoxazole-trimethoprim (BACTRIM) 400-80 MG tablet     topiramate (TOPAMAX) 200 MG tablet     vilazodone (VIIBRYD) 10 MG TABS tablet     vilazodone (VIIBRYD) 40 MG TABS tablet     Current Facility-Administered Medications   Medication     botulinum toxin type A (BOTOX) 100 units injection 100 Units      Past Medical History:   Patient Active Problem List   Diagnosis     HTN, kidney transplant related     Hyperlipidemia     Abnormal MRI, cervical spine     Sensory loss     Major depressive disorder, recurrent episode, moderate (H)     Generalized anxiety disorder     CMC DJD(carpometacarpal degenerative joint disease), localized primary     Pain in joint, forearm -- L unhealed Fx     Kidney replaced by transplant     Immunosuppression (H)     Senile osteoporosis     Pain in joint involving ankle and foot     Nightmares associated with chronic post-traumatic stress disorder     DM type 2 with Neuropathy, Hgb A1C 7.3 on 1/25/21     Posttraumatic stress disorder     Plantar fasciitis, bilateral     Right knee pain     Age-related osteoporosis without current pathological fracture     Narcissistic personality disorder (H)     Personal history of other drug therapy     Median sensory  neuropathy, left     Marital conflict     Suicidal ideation     Autosomal dominant polycystic kidney disease     Secondary hyperparathyroidism (H)     Morbid obesity (H)     CKD (chronic kidney disease) stage 3, GFR 30-59 ml/min (H)     Intractable back pain     Generalized muscle weakness     History of kidney transplant     IPMN (intraductal papillary mucinous neoplasm)     Elevated serum creatinine     AMS (altered mental status)     Past Medical History:   Diagnosis Date     Abnormal MRI, cervical spine 10/15/2011    2011; mild changes noted. Study done for left arm symptoms Impression:  1. Mild multilevel degenerative disc disease with no significant canal or neural stenosis seen. motion artifact on the STIR images in these are not interpretable. The remaining images were interpreted      Autosomal dominant polycystic kidney disease 2011     (Problem list name updated by automated process. Provider to review and confirm.)     CMC DJD(carpometacarpal degenerative joint disease), localized primary 2013     -donor kidney transplant 2014     Gastroesophageal reflux disease      Generalized anxiety disorder 11/15/2012     Glaucoma      Hyperlipidemia 10/15/2011     Hyperparathyroidism, secondary (H) 2015     Hypertension     resolved     Immunosuppressed status (H) 2014     Major depressive disorder, recurrent episode, moderate (H) 11/15/2012     Obesity (BMI 30-39.9)      OP (osteoporosis) T score -3.8 2009 T-score -3.7      LION (obstructive sleep apnoea) 10/15/2012    reported intolerant to CPAP -- she says she doesn't have LION     Pain in joint, forearm -- L unhealed Fx 2013     Premature menopause age 37 07/10/2012    OCP (vaginal bldg)-->HT which she stopped 2 mo later documented at 2007 visit (age 51).      Restless leg syndrome      Stiffness of joint, not elsewhere classified, hand 2013     Tremor 10/15/2011    head     Type 2 DM with  Neuropathy 1985    started with gestational diabetes     Uncomplicated asthma         CC No referring provider defined for this encounter. on close of this encounter.

## 2022-07-19 NOTE — NURSING NOTE
Dermatology Rooming Note    Elizabeth Palma's goals for this visit include:   Chief Complaint   Patient presents with     Skin Check     Elizabeth is here for a skin check.      Audrey Mata, Facilitator

## 2022-07-25 ENCOUNTER — MEDICAL CORRESPONDENCE (OUTPATIENT)
Dept: HEALTH INFORMATION MANAGEMENT | Facility: CLINIC | Age: 67
End: 2022-07-25

## 2022-07-25 ENCOUNTER — TELEPHONE (OUTPATIENT)
Dept: TRANSPLANT | Facility: CLINIC | Age: 67
End: 2022-07-25

## 2022-07-25 DIAGNOSIS — Z94.0 KIDNEY REPLACED BY TRANSPLANT: Primary | ICD-10-CM

## 2022-07-27 NOTE — TELEPHONE ENCOUNTER
ISSUE: Patient is moving to [place] 8/5/2022 to be near son.  Per patient, patient will be following with Dr Kevin Hollingsworth MD at organ transplant center at [facility].    Patient scheduled to have labs 8/2/2022 prior to their move.  Will update Dr Jordan    Message left for RNCC at [place] to [facility] prior to transfer of care.    Marivel Marcelo RN   Transplant Coordinator  [phone]    ADDENDUM:    Spoke with RNCC at transplant center in [place].  Patient will be seen in clinic by transplant nephrologist 09/2022    Mairvel Marcelo RN   Transplant Coordinator  [phone]

## 2022-07-28 ENCOUNTER — TELEPHONE (OUTPATIENT)
Dept: INTERNAL MEDICINE | Facility: CLINIC | Age: 67
End: 2022-07-28

## 2022-07-28 NOTE — TELEPHONE ENCOUNTER
Verbal orders given to Lorelei from [facility], per Dr. Sheridan, for Delaying Home Care Discharge to next week 8/1 due to multiple medical appts this week with a failed attempt visit today. Rosi Daniels LPN 7/28/2022 12:43 PM

## 2022-07-28 NOTE — TELEPHONE ENCOUNTER
[facility] Call Center    Phone Message    May a detailed message be left on voicemail: yes     Reason for Call: Order(s): Other:   Reason for requested: Delaying Home Care Discharge to next week 8/1 due to multiple medical appts this week with a failed attempt visit today.  Date needed: ASAP  Provider name: D Sick      Action Taken: Message routed to:  [facility]    Travel Screening: Not Applicable

## 2022-07-29 ENCOUNTER — TELEPHONE (OUTPATIENT)
Dept: INTERNAL MEDICINE | Facility: CLINIC | Age: 67
End: 2022-07-29

## 2022-07-29 NOTE — TELEPHONE ENCOUNTER
Health Call Center    Phone Message    May a detailed message be left on voicemail: yes     Reason for Call: Form or Letter   Type or form/letter needing completion: Home Health Plan of Care, Physician Order   Provider: Dr. Fatimah Castellano form needed: asap  Once completed: Fax form to: Fax #: [phone]     [facility] reporting they received back from the clinic 4 orders they had sent for signature. 2 were signed and 2 were not. Will be resent it today. Needing 2 forms signed asap. Thank you.    Please sign and fax:  Home Health Plan of Care 6 page Order #: [id number]  Physician Order 1 page Order#: [id number]      Action Taken: Message routed to:  [facility]    Travel Screening: Not Applicable

## 2022-08-01 ENCOUNTER — MEDICAL CORRESPONDENCE (OUTPATIENT)
Dept: INTERNAL MEDICINE | Facility: CLINIC | Age: 67
End: 2022-08-01

## 2022-08-01 DIAGNOSIS — Z53.9 DIAGNOSIS NOT YET DEFINED: Primary | ICD-10-CM

## 2022-08-02 ENCOUNTER — LAB (OUTPATIENT)
Dept: LAB | Facility: CLINIC | Age: 67
End: 2022-08-02
Payer: MEDICARE

## 2022-08-02 DIAGNOSIS — R79.89 ELEVATED SERUM CREATININE: ICD-10-CM

## 2022-08-02 DIAGNOSIS — M81.0 SENILE OSTEOPOROSIS: ICD-10-CM

## 2022-08-02 DIAGNOSIS — Z94.0 KIDNEY REPLACED BY TRANSPLANT: ICD-10-CM

## 2022-08-02 DIAGNOSIS — M81.0 AGE-RELATED OSTEOPOROSIS WITHOUT CURRENT PATHOLOGICAL FRACTURE: ICD-10-CM

## 2022-08-02 DIAGNOSIS — G25.0 BENIGN ESSENTIAL TREMOR: Primary | ICD-10-CM

## 2022-08-02 DIAGNOSIS — Z92.29 PERSONAL HISTORY OF OTHER DRUG THERAPY: ICD-10-CM

## 2022-08-02 LAB
ANION GAP SERPL CALCULATED.3IONS-SCNC: 7 MMOL/L (ref 3–14)
BUN SERPL-MCNC: 14 MG/DL (ref 7–30)
CALCIUM SERPL-MCNC: 9.4 MG/DL (ref 8.5–10.1)
CHLORIDE BLD-SCNC: 111 MMOL/L (ref 94–109)
CO2 SERPL-SCNC: 24 MMOL/L (ref 20–32)
CREAT SERPL-MCNC: 1.12 MG/DL (ref 0.52–1.04)
GFR SERPL CREATININE-BSD FRML MDRD: 55 ML/MIN/1.73M2
GLUCOSE BLD-MCNC: 162 MG/DL (ref 70–99)
POTASSIUM BLD-SCNC: 5.2 MMOL/L (ref 3.4–5.3)
SODIUM SERPL-SCNC: 142 MMOL/L (ref 133–144)

## 2022-08-02 PROCEDURE — 80048 BASIC METABOLIC PNL TOTAL CA: CPT | Performed by: PATHOLOGY

## 2022-08-02 PROCEDURE — 36415 COLL VENOUS BLD VENIPUNCTURE: CPT | Performed by: PATHOLOGY

## 2022-08-02 PROCEDURE — 87799 DETECT AGENT NOS DNA QUANT: CPT | Performed by: INTERNAL MEDICINE

## 2022-08-03 LAB
CMV DNA SPEC NAA+PROBE-ACNC: NOT DETECTED IU/ML
EBV DNA # SPEC NAA+PROBE: NOT DETECTED COPIES/ML

## 2022-08-03 RX ORDER — PROPRANOLOL HYDROCHLORIDE 20 MG/1
40 TABLET ORAL 2 TIMES DAILY
Qty: 360 TABLET | Refills: 3 | Status: SHIPPED | OUTPATIENT
Start: 2022-08-03

## 2022-08-03 NOTE — TELEPHONE ENCOUNTER
"propranolol (INDERAL) 20 MG tablet      Last Written Prescription Date:  *Historical     Last Office Visit : 6/6/2022  Future Office visit:  none    Routing refill request to provider for review/approval because:  Refill needs review:.  - Currently historical entry on med list and requested by pharmacy as \"take 2 tablets 2 times daily afternoon and evening\"  - previously ordered by PCP/Dr Sheridan 2/25/2022 as \"take 2 tablets in morning, 1 tablet at noon, and 1 tablet in the evening\" Disp:360 R:1    "

## 2022-08-04 ENCOUNTER — VIRTUAL VISIT (OUTPATIENT)
Dept: PSYCHIATRY | Facility: CLINIC | Age: 67
End: 2022-08-04
Payer: MEDICARE

## 2022-08-04 VITALS — WEIGHT: 190 LBS | BODY MASS INDEX: 35.87 KG/M2 | HEIGHT: 61 IN

## 2022-08-04 DIAGNOSIS — F33.1 MAJOR DEPRESSIVE DISORDER, RECURRENT EPISODE, MODERATE (H): ICD-10-CM

## 2022-08-04 DIAGNOSIS — F51.5 NIGHTMARES ASSOCIATED WITH CHRONIC POST-TRAUMATIC STRESS DISORDER: ICD-10-CM

## 2022-08-04 DIAGNOSIS — F43.10 POSTTRAUMATIC STRESS DISORDER: Primary | ICD-10-CM

## 2022-08-04 DIAGNOSIS — F42.4 EXCORIATION (SKIN-PICKING) DISORDER: ICD-10-CM

## 2022-08-04 DIAGNOSIS — F43.12 NIGHTMARES ASSOCIATED WITH CHRONIC POST-TRAUMATIC STRESS DISORDER: ICD-10-CM

## 2022-08-04 RX ORDER — VILAZODONE HYDROCHLORIDE 40 MG/1
TABLET ORAL
Qty: 90 TABLET | Refills: 1 | Status: SHIPPED | OUTPATIENT
Start: 2022-08-04

## 2022-08-04 RX ORDER — PRAZOSIN HYDROCHLORIDE 5 MG/1
CAPSULE ORAL
Qty: 270 CAPSULE | Refills: 1 | Status: SHIPPED | OUTPATIENT
Start: 2022-08-04

## 2022-08-04 RX ORDER — VILAZODONE HYDROCHLORIDE 10 MG/1
TABLET ORAL
Qty: 90 TABLET | Refills: 1 | Status: SHIPPED | OUTPATIENT
Start: 2022-08-04

## 2022-08-04 RX ORDER — LORAZEPAM 0.5 MG/1
TABLET ORAL
Qty: 30 TABLET | Refills: 1 | Status: SHIPPED | OUTPATIENT
Start: 2022-08-04 | End: 2022-08-23

## 2022-08-04 RX ORDER — PRAZOSIN HYDROCHLORIDE 2 MG/1
2 CAPSULE ORAL AT BEDTIME
Qty: 90 CAPSULE | Refills: 1 | Status: SHIPPED | OUTPATIENT
Start: 2022-08-04

## 2022-08-04 RX ORDER — ARIPIPRAZOLE 2 MG/1
3 TABLET ORAL 2 TIMES DAILY
Qty: 135 TABLET | Refills: 1 | Status: SHIPPED | OUTPATIENT
Start: 2022-08-04

## 2022-08-04 ASSESSMENT — PAIN SCALES - GENERAL: PAINLEVEL: NO PAIN (0)

## 2022-08-04 ASSESSMENT — PATIENT HEALTH QUESTIONNAIRE - PHQ9: SUM OF ALL RESPONSES TO PHQ QUESTIONS 1-9: 13

## 2022-08-04 NOTE — NURSING NOTE
Patient is moving to [facility] tomorrow 8/4/22    Patient said no changes to medications.     Patient scored 13 on PHQ-9      Lisa Joseph, VF

## 2022-08-04 NOTE — PROGRESS NOTES
"Since last visit:    Pt reports that she has been doing \"about the same.\"     Is getting ready to move to [place] to be close to son, daughter-in-law, and grandson.     Has already set up appointments with new providers. Has a new primary care provider and will be continuing with current therapist. Requests to continue with this provider for psychiatric care.    At last visit, increased dose of Viibryd. Has felt \"much better\" since this increase. Elaborates that \"day to day\" is much easier.  notes that she doesn't get discouraged as quickly and generally is \"easier to deal with.\"    At last visit, added some lorazepam. Sh has been using a dose every other week at most. Uses when she is feeling \"really agitated.\" Notes that it takes some time (>30 minutes) to become effective but has not had any side effects.    Denies having suicidal thoughts. Does note that agitation has been worse but also recognizes this is attributable to stress related to moving.    Also reports that she was hospitalized for covid in April. Has had some residual memory problems, tremor, and need for a walker when ambulating. Encouraged her to speak with her PCP about getting a referral    Is scheduled to see her primary care doctor on 8/31.    Continues in weekly individual psychotherapy.    Denies having suicidal thoughts or thoughts of self-harm. Validated her ongoing work on using skills as a success given current situation. Encouraged her to reach out to this provider/clinic should mood deteriorate or should she experience increase in SI. She was agreeable to this plan.    "

## 2022-08-04 NOTE — PROGRESS NOTES
Elizabeth is a 66 year old who is being evaluated via a billable video visit.      How would you like to obtain your AVS? MyChart  If the video visit is dropped, the invitation should be resent by: Text to cell phone: [phone]  Will anyone else be joining your video visit? Yes: -Rahat.     JESSY Bennett      Video-Visit Details    Video Start Time: 10:45 AM    Type of service:  Video Visit    Video End Time:11:15 AM    Originating Location (pt. Location): Home    Distant Location (provider location):  [facility]     Platform used for Video Visit: Angela
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
## 2022-08-04 NOTE — PROGRESS NOTES
After conclusion of visit, discussed possibility of continuing psychiatric care if patient is located out of state. Unfortunately, will not be to provide care if patient is not located in [place]. Provided 90-day rx of all of Elizabeth's psychiatric medication. Called Elizabeth to inform her that I would not be able to continue as her psychiatrist and encouraged her to speak with PCP or primary care clinic for referral to psychiatrist in the [facility] system. Patient endorsed understanding and agreed with plan for transitioning care.     JOEY Hatch MD  [facility]  Department of Psychiatry

## 2022-08-09 ENCOUNTER — TELEPHONE (OUTPATIENT)
Dept: INTERNAL MEDICINE | Facility: CLINIC | Age: 67
End: 2022-08-09

## 2022-08-09 NOTE — TELEPHONE ENCOUNTER
[facility] Call Center    Phone Message    May a detailed message be left on voicemail: yes     Reason for Call: Other: Patient's  reporting they have permanently moved out of state. New address has been updated on file.     Action Taken: Message routed to:  [facility] ([facility] PCC    Travel Screening: Not Applicable

## 2022-08-12 ENCOUNTER — MEDICAL CORRESPONDENCE (OUTPATIENT)
Dept: HEALTH INFORMATION MANAGEMENT | Facility: CLINIC | Age: 67
End: 2022-08-12

## 2022-08-18 ENCOUNTER — TELEPHONE (OUTPATIENT)
Dept: TRANSPLANT | Facility: CLINIC | Age: 67
End: 2022-08-18

## 2022-08-18 DIAGNOSIS — D84.9 IMMUNOSUPPRESSION (H): ICD-10-CM

## 2022-08-18 DIAGNOSIS — Z94.0 KIDNEY REPLACED BY TRANSPLANT: ICD-10-CM

## 2022-08-18 NOTE — TELEPHONE ENCOUNTER
Needs PA for both MPA 180mg and Cyclosporine 25mg and new script's;  has moved and is using [facility] in [facility] on [address] [facility]   (added to snap shot)

## 2022-08-19 DIAGNOSIS — F33.1 MAJOR DEPRESSIVE DISORDER, RECURRENT EPISODE, MODERATE (H): ICD-10-CM

## 2022-08-19 RX ORDER — CYCLOSPORINE 25 MG/1
100 CAPSULE, LIQUID FILLED ORAL 2 TIMES DAILY
Qty: 720 CAPSULE | Refills: 0 | Status: SHIPPED | OUTPATIENT
Start: 2022-08-19

## 2022-08-19 RX ORDER — MYCOPHENOLIC ACID 180 MG/1
540 TABLET, DELAYED RELEASE ORAL 2 TIMES DAILY
Qty: 540 TABLET | Refills: 0 | Status: SHIPPED | OUTPATIENT
Start: 2022-08-19

## 2022-08-19 NOTE — TELEPHONE ENCOUNTER
Called pharmacy and provided them ith both medicare and patients supplemental plan  Pharmacy will look into billing      Informed patient as well and let her know that every pharmacy is a little different as far as billing and may not be able to bill the supplemental however she maybe able to  Submit for reimbursement

## 2022-08-19 NOTE — TELEPHONE ENCOUNTER
Patient   is asking for a refill on Lorazepam 0.5 mg  until patient find a new psychiatrist in [place].

## 2022-08-23 RX ORDER — LORAZEPAM 0.5 MG/1
TABLET ORAL
Qty: 30 TABLET | Refills: 1 | Status: SHIPPED | OUTPATIENT
Start: 2022-08-23

## 2022-10-10 ENCOUNTER — HEALTH MAINTENANCE LETTER (OUTPATIENT)
Age: 67
End: 2022-10-10

## 2022-10-12 DIAGNOSIS — F51.5 NIGHTMARES ASSOCIATED WITH CHRONIC POST-TRAUMATIC STRESS DISORDER: ICD-10-CM

## 2022-10-12 DIAGNOSIS — F43.12 NIGHTMARES ASSOCIATED WITH CHRONIC POST-TRAUMATIC STRESS DISORDER: ICD-10-CM

## 2022-10-12 RX ORDER — PRAZOSIN HYDROCHLORIDE 5 MG/1
CAPSULE ORAL
Qty: 270 CAPSULE | Refills: 1 | OUTPATIENT
Start: 2022-10-12

## 2022-11-27 ENCOUNTER — HEALTH MAINTENANCE LETTER (OUTPATIENT)
Age: 67
End: 2022-11-27

## 2023-01-25 NOTE — TELEPHONE ENCOUNTER
We have a del date for the pt of 5/5 can we please get this sent over '     Area L Indication Text: Tumors in this location are included in Area L (trunk and extremities).  Mohs surgery is indicated for larger tumors, or tumors with aggressive histologic features, in these anatomic locations.

## 2023-04-17 NOTE — MR AVS SNAPSHOT
MRN:[id number]                      After Visit Summary   7/31/2018    Elizabeth Palma    MRN: [id number]           Visit Information        Provider Department      7/31/2018 9:30 AM Francesca Danielle RD [facility]        Your next 10 appointments already scheduled     Aug 21, 2018  1:00 PM CDT   Adult Med Follow UP with Chaparrita Hatch MD   [facility] ([facility])    [address]  [facility] [address]   [phone]            Aug 28, 2018  9:00 AM CDT   Lab with [facility] LAB   [facility] ([facility])    [address]  [address] [facility] [id number] [phone]            Aug 28, 2018  9:30 AM CDT   (Arrive by 9:15 AM)   NUTRITION VISIT with Francesca Danielle RD   [facility] ([facility])    [address] [place] [id number] [phone]            Sep 25, 2018  9:00 AM CDT   Lab with [facility]   [facility] ([facility])    [address] [id number] [phone]            Sep 25, 2018  9:30 AM CDT   (Arrive by 9:15 AM)   NUTRITION VISIT with Francesca Danielle RD   [facility] ([facility])    [address] [place] [id number] [phone]            Oct 30, 2018  9:30 AM CDT   (Arrive by 9:15 AM)   NUTRITION VISIT with Francesca Danielle RD   [facility] ([facility])    [address] [place] [address]  [address] [facility] [id number] [phone]            Nov 05, 2018 12:00 PM CST   DX HIP/PELVIS/SPINE with UCDX[address] [place] [address] [facility] ([facility])    [address] [place] [id number] [phone]           Please do not take any of the following 24  hours prior to the day of your exam: vitamins, calcium tablets, antacids.  If possible, please wear clothes without metal (snaps, zippers). A sweatsuit works well.            Nov 05, 2018  1:00 PM CST   (Arrive by 12:45 PM)   RETURN ENDOCRINE with Lizbet Byers MD   [facility] ([facility])    [address] [place] [address]  [address] [facility] [address]   [phone]            Nov 12, 2018 10:30 AM CST   (Arrive by 10:15 AM)   Return Visit with Prakash Irene MD   [facility] ([facility])    [address] [place] [address]  [address] [facility] [address]   [phone]            Nov 27, 2018  9:30 AM CST   (Arrive by 9:15 AM)   NUTRITION VISIT with Leyla Guerrero RD   [facility] ([facility])    [address] [place] [id number] [phone]              PrivateFly Information     PrivateFly gives you secure access to your electronic health record. If you see a primary care provider, you can also send messages to your care team and make appointments. If you have questions, please call your primary care clinic.  If you do not have a primary care provider, please call [phone] and they will assist you.      PrivateFly is an electronic gateway that provides easy, online access to your medical records. With Private[employer], you can request a clinic appointment, read your test results, renew a prescription or communicate with your care team.     To access your existing account, please contact your [facility] or call [phone] for assistance.        Care EveryWhere ID     This is your Care EveryWhere ID. This could be used by other organizations to access your [place] medical records  [id number]        Equal Access to Services     LINNEA HIDALGO : radha Cabrera qaybta kaalmada  ty horowitz ah. So [facility] [phone].    ATENCIÓN: Si habla español, tiene a goetz disposición servicios gratuitos de asistencia lingüística. Llame al [phone].    We comply with applicable federal civil rights laws and [place] laws. We do not discriminate on the basis of race, color, national origin, age, disability, sex, sexual orientation, or gender identity.             Validate Previous Accession (Can Hide Previous Accession In Settings Tab): No

## 2023-05-27 ENCOUNTER — HEALTH MAINTENANCE LETTER (OUTPATIENT)
Age: 68
End: 2023-05-27

## 2023-06-11 ENCOUNTER — HEALTH MAINTENANCE LETTER (OUTPATIENT)
Age: 68
End: 2023-06-11

## 2023-10-29 ENCOUNTER — HEALTH MAINTENANCE LETTER (OUTPATIENT)
Age: 68
End: 2023-10-29

## 2024-01-07 ENCOUNTER — HEALTH MAINTENANCE LETTER (OUTPATIENT)
Age: 69
End: 2024-01-07

## 2024-08-04 ENCOUNTER — HEALTH MAINTENANCE LETTER (OUTPATIENT)
Age: 69
End: 2024-08-04

## 2024-10-13 ENCOUNTER — HEALTH MAINTENANCE LETTER (OUTPATIENT)
Age: 69
End: 2024-10-13

## 2025-01-25 NOTE — PROGRESS NOTES
[facility]    Medicine Progress Note - Hospitalist Service        Date of Admission:  6/18/2021  9:24 AM    Assessment & Plan:     65 year old female with past medical hx of PCKD with renal transplant and DM type 2, who presents with intractable back pain.    E. coli bacteremia  Urinary tract infection with E. coli  -Patient presenting with intractable back pain, UA was abnormal, urine culture growing 2 strains of E. coli  -Blood culture and urine culture both positive for E. coli  -Continue ceftriaxone 2 g IV daily  -ID following, day 4/14 of antibiotics today.  Transition to Ceftin at discharge, likely tomorrow    Intractable back pain   Degenerative Disc Desease L5-S1  Elevated CRP  -Patient does have history of chronic back pain which got worse in the last week or so  -Pain is midline with radiation down to the right heel  -MRI of the lumbar spine does not show any evidence of infection, moderate L5-S1 degenerative changes with moderate bilateral neural foraminal stenosis  -Patient feels that her back pain is substantially better today  -Continue mobilizing  -Initially PT recommended TCU, await reevaluation, patient hesitant about going to TCU    Essential hypertension  -Blood pressure initially on the low/normal side,prazocin on hold; resume tonight as mentioned below     DM type 2 with Neuropathy  -Hemoglobin A1c 6.9  -Continue prior to admission Metformin  -Medium intensity sliding scale    PCKD, S/P Renal Transplant 2014 ([facility])  -cyclosporine levels-therapeutic, awaiting mycophenolate level.  -Continue prior to admission mycophenolate and cyclosporine    PTSD  -Patient appears to very high if off label dose of prazosin at 17 mg nightly  -Blood pressures are now in the higher side, resume at 15 mg nightly tonight.                  Diet: Combination Diet 4973-7514 Calories: Moderate Consistent CHO (4-6 CHO units/meal)     DVT Prophylaxis: Pneumatic Compression Devices   Collier Catheter: not  present  Code Status: Full Code     Disposition Plan    Expected discharge: Likely 6/22, home versus TCU pending further PT evaluation today    Entered: Evans Lanier MD 06/21/2021, 10:18 AM        The patient's care was discussed with the Bedside Nurse and Patient.    Evans Lanier MD  Hospitalist Service  [facility]    ______________________________________________________________________    Interval History   Abdominal pain much improved, range of motion on the right hip and the right lower extremity area much better.  Overall feels much better.  Afebrile.  No nausea or vomiting or diarrhea.    Data reviewed today: I reviewed all medications, new labs and imaging results over the last 24 hours. I personally reviewed no images or EKG's today.    Physical Exam   Vital signs:  Temp: 97.7  F (36.5  C) Temp src: Oral BP: (!) 143/72 Pulse: 82   Resp: 16 SpO2: 100 % O2 Device: None (Room air)   Height: 152.4 cm (5') Weight: 91.1 kg (200 lb 13.4 oz)  Estimated body mass index is 39.22 kg/m  as calculated from the following:    Height as of this encounter: 1.524 m (5').    Weight as of this encounter: 91.1 kg (200 lb 13.4 oz).      Wt Readings from Last 2 Encounters:   06/20/21 91.1 kg (200 lb 13.4 oz)   03/29/21 88.9 kg (196 lb)       Gen: AAOX3, NAD  HEENT: no pallor  Resp: CTA B/L, normal WOB  CVS: RRR, no murmur  Abd/GI: Soft, non-tender. BS- normoactive.    Skin: Warm, dry no rashes  MSK: Tender over lower lumbar spine, still with decreased SLR T on the right side-about 30 degrees  Neuro- CN- intact.         Data   Recent Labs   Lab 06/19/21  0748 06/18/21  0957 06/16/21  2145 06/15/21  1527   WBC 4.9 6.8 7.4 5.7   HGB 11.4* 13.0 13.7 13.2   MCV 89 90 89 90    176 177 144*    139 136 137   POTASSIUM 3.6 4.2 3.8 3.7   CHLORIDE 111* 111* 105 107   CO2 19* 20 20 24   BUN 18 22 19 20   CR 0.98 1.03 1.24* 1.22*   ANIONGAP 9 8 11 6   MARY 8.3* 9.2 9.8 9.1   * 161* 203* 193*    ALBUMIN  --  2.8*  --  3.3*   PROTTOTAL  --  6.8  --  6.8   BILITOTAL  --  1.0  --  0.8   ALKPHOS  --  94  --  53   ALT  --  51*  --  22   AST  --  21  --  8       No results found for this or any previous visit (from the past 24 hour(s)).  Medications       acetaminophen  975 mg Oral Q8H TIN     ARIPiprazole  3 mg Oral BID     cefTRIAXone  2 g Intravenous Q24H     cycloSPORINE modified  125 mg Oral BID     [Held by provider] furosemide  40 mg Oral Daily     gabapentin  600 mg Oral At Bedtime     insulin aspart  1-7 Units Subcutaneous TID AC     insulin aspart  1-5 Units Subcutaneous At Bedtime     latanoprost  1 drop Both Eyes At Bedtime     lidocaine  1 patch Transdermal Q24H     lidocaine   Transdermal Q8H     metFORMIN  1,500 mg Oral Daily with supper     mycophenolate  1,000 mg Oral BID     senna-docusate  1 tablet Oral BID    Or     senna-docusate  2 tablet Oral BID     simvastatin  20 mg Oral At Bedtime     sodium chloride (PF)  3 mL Intracatheter Q8H     sulfamethoxazole-trimethoprim  1 tablet Oral Daily     topiramate  200 mg Oral BID     vilazodone  40 mg Oral Daily        English

## 2025-02-22 ENCOUNTER — HEALTH MAINTENANCE LETTER (OUTPATIENT)
Age: 70
End: 2025-02-22

## 2025-02-26 NOTE — TELEPHONE ENCOUNTER
ISSUE: elevated creatinine: 1.24    PLAN: Call and assess hydration status.  How much water is he drinking per day? 48oz of water per day  Any recent illness, diarrhea, s/s of a UTI, or medication changes? Patient started on a probiotic and metamucil.  Patient states diarrhea continues and that she is having diarrhea every time she has a BM, however the frequency in which she is having to go has decreased.  Any increased intake of alcohol or caffeine? denies  Recommend increasing hydration and repeating labs within 1 week.    OUTCOME:  Encouraged patient to drink closer to 64 oz of water per day.  Will update Dr Jordan.    Marivel Marcelo RN   Transplant Coordinator  [phone]    ADDENDUM:  Francisco Jordan MD Sveiven, Sara, RN  Obtain U/A w/ reflex to culture with next labs that should be in a week please.     Patient states the diarrhea has increased in frequency since we last spoke yesterday.  Will let Dr Jordan know.  Will also send lab orders as stated above.     Marivel Marcelo RN   Transplant Coordinator  [phone]     Writer attempted to call pt on home number - received message \" phone number has changed and change is unknown\". Unable to leave message. Writer attempted to call second phone number which is also her mother Stephany's number - voicemail. Left a message requesting a call back to the clinic.

## (undated) DEVICE — KIT ENDO TURNOVER/PROCEDURE CARRY-ON 101822

## (undated) DEVICE — SPECIMEN CONTAINER 3OZ W/FORMALIN 59901

## (undated) DEVICE — GOWN IMPERVIOUS 2XL BLUE

## (undated) DEVICE — SUCTION MANIFOLD NEPTUNE 2 SYS 1 PORT 702-025-000

## (undated) DEVICE — ENDO FORCEP BX CAPTURA PRO SPIKE G50696

## (undated) DEVICE — TUBING SUCTION 12"X1/4" N612

## (undated) DEVICE — ENDO FORCEP SPIKED SERRATED SHAFT JUMBO 239CM G56998

## (undated) DEVICE — KIT ENDO FIRST STEP DISINFECTANT 200ML W/POUCH EP-4

## (undated) DEVICE — SOL WATER IRRIG 500ML BOTTLE 2F7113

## (undated) RX ORDER — PROPOFOL 10 MG/ML
INJECTION, EMULSION INTRAVENOUS
Status: DISPENSED
Start: 2017-11-30

## (undated) RX ORDER — LIDOCAINE HYDROCHLORIDE 20 MG/ML
INJECTION, SOLUTION EPIDURAL; INFILTRATION; INTRACAUDAL; PERINEURAL
Status: DISPENSED
Start: 2017-11-30